# Patient Record
Sex: MALE | Race: ASIAN | NOT HISPANIC OR LATINO | URBAN - METROPOLITAN AREA
[De-identification: names, ages, dates, MRNs, and addresses within clinical notes are randomized per-mention and may not be internally consistent; named-entity substitution may affect disease eponyms.]

---

## 2020-03-30 ENCOUNTER — INPATIENT (INPATIENT)
Facility: HOSPITAL | Age: 60
LOS: 66 days | Discharge: HOME HEALTH | DRG: 329 | End: 2020-06-05
Attending: STUDENT IN AN ORGANIZED HEALTH CARE EDUCATION/TRAINING PROGRAM | Admitting: HOSPITALIST
Payer: MEDICAID

## 2020-03-30 ENCOUNTER — TRANSCRIPTION ENCOUNTER (OUTPATIENT)
Age: 60
End: 2020-03-30

## 2020-03-30 VITALS
OXYGEN SATURATION: 91 % | DIASTOLIC BLOOD PRESSURE: 54 MMHG | HEIGHT: 67 IN | SYSTOLIC BLOOD PRESSURE: 85 MMHG | RESPIRATION RATE: 18 BRPM | WEIGHT: 199.96 LBS | HEART RATE: 63 BPM

## 2020-03-30 LAB
ALBUMIN SERPL ELPH-MCNC: 4.2 G/DL — SIGNIFICANT CHANGE UP (ref 3.4–5)
ALBUMIN SERPL ELPH-MCNC: 4.5 G/DL — SIGNIFICANT CHANGE UP (ref 3.4–5)
ALBUMIN SERPL ELPH-MCNC: 4.8 G/DL — SIGNIFICANT CHANGE UP (ref 3.3–5)
ALP SERPL-CCNC: 212 U/L — HIGH (ref 40–120)
ALP SERPL-CCNC: 244 U/L — HIGH (ref 40–120)
ALP SERPL-CCNC: 262 U/L — HIGH (ref 40–120)
ALT FLD-CCNC: 39 U/L — SIGNIFICANT CHANGE UP (ref 12–42)
ALT FLD-CCNC: 43 U/L — SIGNIFICANT CHANGE UP (ref 10–45)
ALT FLD-CCNC: 49 U/L — HIGH (ref 12–42)
ANION GAP SERPL CALC-SCNC: 17 MMOL/L — HIGH (ref 9–16)
ANION GAP SERPL CALC-SCNC: 18 MMOL/L — HIGH (ref 5–17)
ANION GAP SERPL CALC-SCNC: 19 MMOL/L — HIGH (ref 9–16)
APTT BLD: 25.5 SEC — LOW (ref 27.5–36.3)
AST SERPL-CCNC: 40 U/L — HIGH (ref 15–37)
AST SERPL-CCNC: 64 U/L — HIGH (ref 10–40)
AST SERPL-CCNC: 65 U/L — HIGH (ref 15–37)
B-OH-BUTYR SERPL-SCNC: 0.5 MMOL/L — HIGH
BASOPHILS # BLD AUTO: 0.07 K/UL — SIGNIFICANT CHANGE UP (ref 0–0.2)
BASOPHILS NFR BLD AUTO: 0.4 % — SIGNIFICANT CHANGE UP (ref 0–2)
BILIRUB SERPL-MCNC: 0.4 MG/DL — SIGNIFICANT CHANGE UP (ref 0.2–1.2)
BILIRUB SERPL-MCNC: 0.6 MG/DL — SIGNIFICANT CHANGE UP (ref 0.2–1.2)
BILIRUB SERPL-MCNC: 0.6 MG/DL — SIGNIFICANT CHANGE UP (ref 0.2–1.2)
BUN SERPL-MCNC: 16 MG/DL — SIGNIFICANT CHANGE UP (ref 7–23)
BUN SERPL-MCNC: 18 MG/DL — SIGNIFICANT CHANGE UP (ref 7–23)
BUN SERPL-MCNC: 19 MG/DL — SIGNIFICANT CHANGE UP (ref 7–23)
CALCIUM SERPL-MCNC: 10.8 MG/DL — HIGH (ref 8.5–10.5)
CALCIUM SERPL-MCNC: 10.8 MG/DL — HIGH (ref 8.5–10.5)
CALCIUM SERPL-MCNC: 11.1 MG/DL — HIGH (ref 8.4–10.5)
CHLORIDE SERPL-SCNC: 83 MMOL/L — LOW (ref 96–108)
CHLORIDE SERPL-SCNC: 87 MMOL/L — LOW (ref 96–108)
CHLORIDE SERPL-SCNC: 88 MMOL/L — LOW (ref 96–108)
CO2 SERPL-SCNC: 20 MMOL/L — LOW (ref 22–31)
CREAT SERPL-MCNC: 1.62 MG/DL — HIGH (ref 0.5–1.3)
CREAT SERPL-MCNC: 1.99 MG/DL — HIGH (ref 0.5–1.3)
CREAT SERPL-MCNC: 2.28 MG/DL — HIGH (ref 0.5–1.3)
EOSINOPHIL # BLD AUTO: 0.06 K/UL — SIGNIFICANT CHANGE UP (ref 0–0.5)
EOSINOPHIL NFR BLD AUTO: 0.3 % — SIGNIFICANT CHANGE UP (ref 0–6)
GLUCOSE BLDC GLUCOMTR-MCNC: 227 MG/DL — HIGH (ref 70–99)
GLUCOSE BLDC GLUCOMTR-MCNC: 232 MG/DL — HIGH (ref 70–99)
GLUCOSE BLDC GLUCOMTR-MCNC: 261 MG/DL — HIGH (ref 70–99)
GLUCOSE SERPL-MCNC: 244 MG/DL — HIGH (ref 70–99)
GLUCOSE SERPL-MCNC: 264 MG/DL — HIGH (ref 70–99)
GLUCOSE SERPL-MCNC: 324 MG/DL — HIGH (ref 70–99)
HCT VFR BLD CALC: 37.8 % — LOW (ref 39–50)
HCT VFR BLD CALC: 39.7 % — SIGNIFICANT CHANGE UP (ref 39–50)
HGB BLD-MCNC: 13.2 G/DL — SIGNIFICANT CHANGE UP (ref 13–17)
HGB BLD-MCNC: 13.8 G/DL — SIGNIFICANT CHANGE UP (ref 13–17)
IMM GRANULOCYTES NFR BLD AUTO: 0.7 % — SIGNIFICANT CHANGE UP (ref 0–1.5)
INR BLD: 1.02 — SIGNIFICANT CHANGE UP (ref 0.88–1.16)
LACTATE SERPL-SCNC: 3.6 MMOL/L — HIGH (ref 0.5–2)
LACTATE SERPL-SCNC: 3.7 MMOL/L — HIGH (ref 0.5–2)
LACTATE SERPL-SCNC: 6.8 MMOL/L — CRITICAL HIGH (ref 0.4–2)
LACTATE SERPL-SCNC: 8.6 MMOL/L — CRITICAL HIGH (ref 0.4–2)
LIDOCAIN IGE QN: 748 U/L — HIGH (ref 73–393)
LYMPHOCYTES # BLD AUTO: 1.64 K/UL — SIGNIFICANT CHANGE UP (ref 1–3.3)
LYMPHOCYTES # BLD AUTO: 8.6 % — LOW (ref 13–44)
MAGNESIUM SERPL-MCNC: 2 MG/DL — SIGNIFICANT CHANGE UP (ref 1.6–2.6)
MAGNESIUM SERPL-MCNC: 2 MG/DL — SIGNIFICANT CHANGE UP (ref 1.6–2.6)
MAGNESIUM SERPL-MCNC: 2.1 MG/DL — SIGNIFICANT CHANGE UP (ref 1.6–2.6)
MCHC RBC-ENTMCNC: 29.9 PG — SIGNIFICANT CHANGE UP (ref 27–34)
MCHC RBC-ENTMCNC: 30 PG — SIGNIFICANT CHANGE UP (ref 27–34)
MCHC RBC-ENTMCNC: 34.8 GM/DL — SIGNIFICANT CHANGE UP (ref 32–36)
MCHC RBC-ENTMCNC: 34.9 GM/DL — SIGNIFICANT CHANGE UP (ref 32–36)
MCV RBC AUTO: 85.9 FL — SIGNIFICANT CHANGE UP (ref 80–100)
MCV RBC AUTO: 86.1 FL — SIGNIFICANT CHANGE UP (ref 80–100)
MONOCYTES # BLD AUTO: 0.48 K/UL — SIGNIFICANT CHANGE UP (ref 0–0.9)
MONOCYTES NFR BLD AUTO: 2.5 % — SIGNIFICANT CHANGE UP (ref 2–14)
NEUTROPHILS # BLD AUTO: 16.65 K/UL — HIGH (ref 1.8–7.4)
NEUTROPHILS NFR BLD AUTO: 87.5 % — HIGH (ref 43–77)
NRBC # BLD: 0 /100 WBCS — SIGNIFICANT CHANGE UP (ref 0–0)
NRBC # BLD: 0 /100 WBCS — SIGNIFICANT CHANGE UP (ref 0–0)
NT-PROBNP SERPL-SCNC: 19 PG/ML — SIGNIFICANT CHANGE UP
PCO2 BLDV: 55 MMHG — HIGH (ref 41–51)
PCO2 BLDV: 60 MMHG — HIGH (ref 41–51)
PH BLDV: 7.18 — CRITICAL LOW (ref 7.32–7.43)
PH BLDV: 7.18 — CRITICAL LOW (ref 7.32–7.43)
PHOSPHATE SERPL-MCNC: 6.5 MG/DL — HIGH (ref 2.5–4.5)
PHOSPHATE SERPL-MCNC: 7.8 MG/DL — HIGH (ref 2.5–4.5)
PLATELET # BLD AUTO: 162 K/UL — SIGNIFICANT CHANGE UP (ref 150–400)
PLATELET # BLD AUTO: 229 K/UL — SIGNIFICANT CHANGE UP (ref 150–400)
PO2 BLDV: 25 MMHG — LOW (ref 35–40)
PO2 BLDV: 38 MMHG — SIGNIFICANT CHANGE UP (ref 35–40)
POTASSIUM SERPL-MCNC: 3.4 MMOL/L — LOW (ref 3.5–5.3)
POTASSIUM SERPL-MCNC: 4.2 MMOL/L — SIGNIFICANT CHANGE UP (ref 3.5–5.3)
POTASSIUM SERPL-MCNC: 5.4 MMOL/L — HIGH (ref 3.5–5.3)
POTASSIUM SERPL-SCNC: 3.4 MMOL/L — LOW (ref 3.5–5.3)
POTASSIUM SERPL-SCNC: 4.2 MMOL/L — SIGNIFICANT CHANGE UP (ref 3.5–5.3)
POTASSIUM SERPL-SCNC: 5.4 MMOL/L — HIGH (ref 3.5–5.3)
PROT SERPL-MCNC: 7.9 G/DL — SIGNIFICANT CHANGE UP (ref 6.4–8.2)
PROT SERPL-MCNC: 8.2 G/DL — SIGNIFICANT CHANGE UP (ref 6–8.3)
PROT SERPL-MCNC: 8.4 G/DL — HIGH (ref 6.4–8.2)
PROTHROM AB SERPL-ACNC: 11.3 SEC — SIGNIFICANT CHANGE UP (ref 10–12.9)
RBC # BLD: 4.4 M/UL — SIGNIFICANT CHANGE UP (ref 4.2–5.8)
RBC # BLD: 4.61 M/UL — SIGNIFICANT CHANGE UP (ref 4.2–5.8)
RBC # FLD: 12.5 % — SIGNIFICANT CHANGE UP (ref 10.3–14.5)
RBC # FLD: 12.5 % — SIGNIFICANT CHANGE UP (ref 10.3–14.5)
SAO2 % BLDV: 30 % — SIGNIFICANT CHANGE UP
SAO2 % BLDV: 58 % — SIGNIFICANT CHANGE UP
SODIUM SERPL-SCNC: 122 MMOL/L — LOW (ref 132–145)
SODIUM SERPL-SCNC: 125 MMOL/L — LOW (ref 132–145)
SODIUM SERPL-SCNC: 125 MMOL/L — LOW (ref 135–145)
TROPONIN I SERPL-MCNC: <0.017 NG/ML — LOW (ref 0.02–0.06)
WBC # BLD: 11.19 K/UL — HIGH (ref 3.8–10.5)
WBC # BLD: 19.03 K/UL — HIGH (ref 3.8–10.5)
WBC # FLD AUTO: 11.19 K/UL — HIGH (ref 3.8–10.5)
WBC # FLD AUTO: 19.03 K/UL — HIGH (ref 3.8–10.5)

## 2020-03-30 PROCEDURE — 93010 ELECTROCARDIOGRAM REPORT: CPT

## 2020-03-30 PROCEDURE — 71045 X-RAY EXAM CHEST 1 VIEW: CPT | Mod: 26

## 2020-03-30 PROCEDURE — 70450 CT HEAD/BRAIN W/O DYE: CPT | Mod: 26

## 2020-03-30 PROCEDURE — 71250 CT THORAX DX C-: CPT | Mod: 26

## 2020-03-30 PROCEDURE — 99291 CRITICAL CARE FIRST HOUR: CPT

## 2020-03-30 PROCEDURE — 99291 CRITICAL CARE FIRST HOUR: CPT | Mod: 25

## 2020-03-30 PROCEDURE — 74176 CT ABD & PELVIS W/O CONTRAST: CPT | Mod: 26

## 2020-03-30 RX ORDER — DEXTROSE 50 % IN WATER 50 %
25 SYRINGE (ML) INTRAVENOUS ONCE
Refills: 0 | Status: DISCONTINUED | OUTPATIENT
Start: 2020-03-30 | End: 2020-06-05

## 2020-03-30 RX ORDER — DEXTROSE 50 % IN WATER 50 %
15 SYRINGE (ML) INTRAVENOUS ONCE
Refills: 0 | Status: DISCONTINUED | OUTPATIENT
Start: 2020-03-30 | End: 2020-06-05

## 2020-03-30 RX ORDER — SODIUM CHLORIDE 9 MG/ML
1000 INJECTION, SOLUTION INTRAVENOUS
Refills: 0 | Status: DISCONTINUED | OUTPATIENT
Start: 2020-03-30 | End: 2020-03-30

## 2020-03-30 RX ORDER — POTASSIUM CHLORIDE 20 MEQ
80 PACKET (EA) ORAL ONCE
Refills: 0 | Status: COMPLETED | OUTPATIENT
Start: 2020-03-30 | End: 2020-03-30

## 2020-03-30 RX ORDER — INSULIN HUMAN 100 [IU]/ML
9 INJECTION, SOLUTION SUBCUTANEOUS ONCE
Refills: 0 | Status: DISCONTINUED | OUTPATIENT
Start: 2020-03-30 | End: 2020-03-30

## 2020-03-30 RX ORDER — PIPERACILLIN AND TAZOBACTAM 4; .5 G/20ML; G/20ML
3.38 INJECTION, POWDER, LYOPHILIZED, FOR SOLUTION INTRAVENOUS EVERY 6 HOURS
Refills: 0 | Status: COMPLETED | OUTPATIENT
Start: 2020-03-30 | End: 2020-04-06

## 2020-03-30 RX ORDER — TETRACAINE/BENZOCAINE/BUTAMBEN 2%-14%-2%
1 OINTMENT (GRAM) TOPICAL ONCE
Refills: 0 | Status: COMPLETED | OUTPATIENT
Start: 2020-03-30 | End: 2020-03-30

## 2020-03-30 RX ORDER — SODIUM CHLORIDE 9 MG/ML
1000 INJECTION INTRAMUSCULAR; INTRAVENOUS; SUBCUTANEOUS
Refills: 0 | Status: DISCONTINUED | OUTPATIENT
Start: 2020-03-30 | End: 2020-03-31

## 2020-03-30 RX ORDER — ONDANSETRON 8 MG/1
4 TABLET, FILM COATED ORAL ONCE
Refills: 0 | Status: COMPLETED | OUTPATIENT
Start: 2020-03-30 | End: 2020-03-30

## 2020-03-30 RX ORDER — MORPHINE SULFATE 50 MG/1
4 CAPSULE, EXTENDED RELEASE ORAL ONCE
Refills: 0 | Status: DISCONTINUED | OUTPATIENT
Start: 2020-03-30 | End: 2020-03-30

## 2020-03-30 RX ORDER — SODIUM CHLORIDE 9 MG/ML
1000 INJECTION INTRAMUSCULAR; INTRAVENOUS; SUBCUTANEOUS ONCE
Refills: 0 | Status: COMPLETED | OUTPATIENT
Start: 2020-03-30 | End: 2020-03-30

## 2020-03-30 RX ORDER — ASPIRIN/CALCIUM CARB/MAGNESIUM 324 MG
81 TABLET ORAL DAILY
Refills: 0 | Status: DISCONTINUED | OUTPATIENT
Start: 2020-03-30 | End: 2020-04-15

## 2020-03-30 RX ORDER — HEPARIN SODIUM 5000 [USP'U]/ML
5000 INJECTION INTRAVENOUS; SUBCUTANEOUS EVERY 8 HOURS
Refills: 0 | Status: DISCONTINUED | OUTPATIENT
Start: 2020-03-30 | End: 2020-04-13

## 2020-03-30 RX ORDER — INSULIN HUMAN 100 [IU]/ML
9 INJECTION, SOLUTION SUBCUTANEOUS ONCE
Refills: 0 | Status: COMPLETED | OUTPATIENT
Start: 2020-03-30 | End: 2020-03-30

## 2020-03-30 RX ORDER — PIPERACILLIN AND TAZOBACTAM 4; .5 G/20ML; G/20ML
3.38 INJECTION, POWDER, LYOPHILIZED, FOR SOLUTION INTRAVENOUS ONCE
Refills: 0 | Status: COMPLETED | OUTPATIENT
Start: 2020-03-30 | End: 2020-03-30

## 2020-03-30 RX ORDER — OXYCODONE HYDROCHLORIDE 5 MG/1
5 TABLET ORAL EVERY 6 HOURS
Refills: 0 | Status: DISCONTINUED | OUTPATIENT
Start: 2020-03-30 | End: 2020-04-01

## 2020-03-30 RX ORDER — THIAMINE MONONITRATE (VIT B1) 100 MG
500 TABLET ORAL EVERY 8 HOURS
Refills: 0 | Status: COMPLETED | OUTPATIENT
Start: 2020-03-30 | End: 2020-04-01

## 2020-03-30 RX ORDER — GLUCAGON INJECTION, SOLUTION 0.5 MG/.1ML
1 INJECTION, SOLUTION SUBCUTANEOUS ONCE
Refills: 0 | Status: DISCONTINUED | OUTPATIENT
Start: 2020-03-30 | End: 2020-06-05

## 2020-03-30 RX ORDER — HYDROMORPHONE HYDROCHLORIDE 2 MG/ML
0.5 INJECTION INTRAMUSCULAR; INTRAVENOUS; SUBCUTANEOUS EVERY 6 HOURS
Refills: 0 | Status: DISCONTINUED | OUTPATIENT
Start: 2020-03-30 | End: 2020-04-01

## 2020-03-30 RX ORDER — INSULIN LISPRO 100/ML
VIAL (ML) SUBCUTANEOUS
Refills: 0 | Status: DISCONTINUED | OUTPATIENT
Start: 2020-03-30 | End: 2020-04-17

## 2020-03-30 RX ORDER — INSULIN GLARGINE 100 [IU]/ML
10 INJECTION, SOLUTION SUBCUTANEOUS ONCE
Refills: 0 | Status: COMPLETED | OUTPATIENT
Start: 2020-03-30 | End: 2020-03-30

## 2020-03-30 RX ORDER — DEXTROSE 50 % IN WATER 50 %
12.5 SYRINGE (ML) INTRAVENOUS ONCE
Refills: 0 | Status: DISCONTINUED | OUTPATIENT
Start: 2020-03-30 | End: 2020-06-05

## 2020-03-30 RX ORDER — SODIUM CHLORIDE 9 MG/ML
1000 INJECTION, SOLUTION INTRAVENOUS
Refills: 0 | Status: DISCONTINUED | OUTPATIENT
Start: 2020-03-30 | End: 2020-06-05

## 2020-03-30 RX ORDER — DEXTROSE MONOHYDRATE, SODIUM CHLORIDE, AND POTASSIUM CHLORIDE 50; .745; 4.5 G/1000ML; G/1000ML; G/1000ML
1000 INJECTION, SOLUTION INTRAVENOUS
Refills: 0 | Status: DISCONTINUED | OUTPATIENT
Start: 2020-03-30 | End: 2020-03-30

## 2020-03-30 RX ORDER — CEFTRIAXONE 500 MG/1
1000 INJECTION, POWDER, FOR SOLUTION INTRAMUSCULAR; INTRAVENOUS ONCE
Refills: 0 | Status: DISCONTINUED | OUTPATIENT
Start: 2020-03-30 | End: 2020-03-30

## 2020-03-30 RX ADMIN — SODIUM CHLORIDE 1000 MILLILITER(S): 9 INJECTION INTRAMUSCULAR; INTRAVENOUS; SUBCUTANEOUS at 11:40

## 2020-03-30 RX ADMIN — SODIUM CHLORIDE 2000 MILLILITER(S): 9 INJECTION INTRAMUSCULAR; INTRAVENOUS; SUBCUTANEOUS at 10:39

## 2020-03-30 RX ADMIN — Medication 105 MILLIGRAM(S): at 23:01

## 2020-03-30 RX ADMIN — HEPARIN SODIUM 5000 UNIT(S): 5000 INJECTION INTRAVENOUS; SUBCUTANEOUS at 22:39

## 2020-03-30 RX ADMIN — PIPERACILLIN AND TAZOBACTAM 200 GRAM(S): 4; .5 INJECTION, POWDER, LYOPHILIZED, FOR SOLUTION INTRAVENOUS at 19:47

## 2020-03-30 RX ADMIN — Medication 80 MILLIEQUIVALENT(S): at 11:37

## 2020-03-30 RX ADMIN — Medication 6: at 22:41

## 2020-03-30 RX ADMIN — MORPHINE SULFATE 4 MILLIGRAM(S): 50 CAPSULE, EXTENDED RELEASE ORAL at 13:58

## 2020-03-30 RX ADMIN — PIPERACILLIN AND TAZOBACTAM 3.38 GRAM(S): 4; .5 INJECTION, POWDER, LYOPHILIZED, FOR SOLUTION INTRAVENOUS at 12:05

## 2020-03-30 RX ADMIN — DEXTROSE MONOHYDRATE, SODIUM CHLORIDE, AND POTASSIUM CHLORIDE 150 MILLILITER(S): 50; .745; 4.5 INJECTION, SOLUTION INTRAVENOUS at 11:37

## 2020-03-30 RX ADMIN — INSULIN HUMAN 9 UNIT(S): 100 INJECTION, SOLUTION SUBCUTANEOUS at 11:37

## 2020-03-30 RX ADMIN — SODIUM CHLORIDE 1000 MILLILITER(S): 9 INJECTION INTRAMUSCULAR; INTRAVENOUS; SUBCUTANEOUS at 13:58

## 2020-03-30 RX ADMIN — ONDANSETRON 4 MILLIGRAM(S): 8 TABLET, FILM COATED ORAL at 13:19

## 2020-03-30 RX ADMIN — PIPERACILLIN AND TAZOBACTAM 200 GRAM(S): 4; .5 INJECTION, POWDER, LYOPHILIZED, FOR SOLUTION INTRAVENOUS at 11:36

## 2020-03-30 NOTE — ED ADULT NURSE NOTE - NSIMPLEMENTINTERV_GEN_ALL_ED
Implemented All Universal Safety Interventions:  [place] to call system. Call bell, personal items and telephone within reach. Instruct patient to call for assistance. Room bathroom lighting operational. Non-slip footwear when patient is off stretcher. Physically safe environment: no spills, clutter or unnecessary equipment. Stretcher in lowest position, wheels locked, appropriate side rails in place.

## 2020-03-30 NOTE — ED ADULT NURSE REASSESSMENT NOTE - NS ED NURSE REASSESS COMMENT FT1
NG tube placed by md campbell in right nostril at this time. 300ml bile colored fluid noted at this time.

## 2020-03-30 NOTE — ED PROVIDER NOTE - NONTENDER LOCATION
left upper quadrant/right lower quadrant/right upper quadrant/left lower quadrant/no rebound or guarding

## 2020-03-30 NOTE — H&P ADULT - ASSESSMENT
This is a 61 y/o M with PMH of HTN, DM complicated by neuropathy, EtOH liver cirrhosis, who presents to [facility] for fall at home.  Work up at [facility] concerning for large bowel distention and possible perforation   Lactates initially 8.6 -> 6.8 -> 3.7  WBC 19 -> 11  Patient was well appearing with no abdominal complaints on arrival, contrasting with concerning labs   Having loose, non bloody stools     Plan:   - Admit to Dr Youngblood, ICU   - R/O CDIff   - Continue Zosyn for now   - NPO/NG  - LR @ 150   - SQ heparin   - Will give thiamine   - Discussed with Chief resident This is a 59 y/o M with PMH of HTN, DM complicated by neuropathy, EtOH liver cirrhosis, who presents to [facility] for fall at home.  Work up at [facility] concerning for large bowel distention and possible perforation   Lactates initially 8.6 -> 6.8 -> 3.7  WBC 19 -> 11  Patient was well appearing with no abdominal complaints on arrival, contrasting with concerning labs   Having loose, non bloody stools     Plan:   - Admit to Dr Youngblood, ICU   - R/O CDIff   - Continue Zosyn for now   - NPO/NG  - NS @ 150   - SQ heparin   - Will give thiamine   - Discussed with Chief resident

## 2020-03-30 NOTE — ED PROVIDER NOTE - CLINICAL SUMMARY MEDICAL DECISION MAKING FREE TEXT BOX
60 y o male with past medical history of DM, HTN, depressive disorder, alcohol use disorder, anemia, liver cirrhosis, and peripheral neuropathy was brought in by EMS from [facility] s/p syncope x 2, head trauma, hypoxia, hypotension, pt reports he tried to have BM then got lightheaded, reports 5 day h/o constipation. On exam, BP 85/54, initial O2 sat 91 -> 96% on RA, abdomen distended. pt had large normal BM in ED. will obtain CT chest and abdomen, CT head and neck, labs/ekg. will give IVF and reassess.

## 2020-03-30 NOTE — ED PROVIDER NOTE - RESPIRATORY, MLM
Home
Breath sounds clear and equal bilaterally. no respiratory distress no increased wob or accessory mm use

## 2020-03-30 NOTE — ED ADULT NURSE NOTE - CHPI ED NUR SYMPTOMS NEG
no diarrhea/no dysuria/no hematuria/no fever/no blood in stool/no nausea/no vomiting/no burning urination/no chills

## 2020-03-30 NOTE — ED ADULT TRIAGE NOTE - CHIEF COMPLAINT QUOTE
[facility]int to ED from shelter for multiple medical complaints.  Patient hypotensive in triage, hyperglycemia, multiple falls, and low O2 saturation (87%) - 96% en route

## 2020-03-30 NOTE — ED PROVIDER NOTE - CARE PLAN
Principal Discharge DX:	Intestinal perforation  Secondary Diagnosis:	Megacolon Principal Discharge DX:	Intestinal perforation  Secondary Diagnosis:	Megacolon  Secondary Diagnosis:	Hyperglycemia

## 2020-03-30 NOTE — ED PROVIDER NOTE - PROGRESS NOTE DETAILS
Pt was successfully able to pass two large BM's in ED on his own.     Upon speaking to him again, patient stated he felt dizzy this morning and fell twice. He states that prior to this morning he was in a normal state of health. Denies cough, fever, chills, or body aches. Pt reports taking lactulose PRN when having difficulty going to bathroom. He endorses history of cirrhosis. Pt was unable to recall last hospitalization and gets most of his care at [facility]. Called [facility] to initiate transfer to ICU. CT abd with positive finding for intestinal perforation, CT chest positive for some infiltrates as well, however, low suspicion for COVID-19 at this time. Pt is on a fluid drip. [place] PGY3: on reassessment pt reports 10/10 severity abdominal pain, appears in NAD abdomen diffusely tender with + rebound, is soft with no guarding. CT shows findings as reported above obstruction ? perforation. NGT placed with 600 mL bilious fluid returned. labs shows acidemia to 7.19 and AG 18 -> 17 after 10 u SQ insulin, lactate 8 -> 6.8. pt in NAD XR shows NGT deep in abdomen was pulled back and on repeat CXR shows NGT appropriately placed. morphine given pt transferred to [place]

## 2020-03-30 NOTE — ED PROCEDURE NOTE - CPROC ED GASTRIC INTUB DETAIL1
The nasogastric tube (see size above) was inserted via the anatomic location./Gastric tube connected to low continuous suction./Placement was confirmed by aspiration of gastric secretions.

## 2020-03-30 NOTE — CONSULT NOTE ADULT - ASSESSMENT
61 y/o M with PMH of HTN, DM complicated by neuropathy, EtOH liver cirrhosis, presenting w/ Uriah's and pneumatosis admitted to SICU for hemodynamic monitoring.    Neuro: CT head negative for bleed or fracture  CV: normotensive goals  Pulm: resp hygiene   FENGI: NPO, NS @150  ID: zosyn (3/30 - )  Endo: ISS  PPx: SCDs, SQH  PT/OT: not ordered

## 2020-03-30 NOTE — ED PROVIDER NOTE - OBJECTIVE STATEMENT
60 y o male with past medical history of DM, HTN, depressive disorder, alcohol use disorder, anemia, liver cirrhosis, and peripheral neuropathy was brought in by EMS from [facility] for multiple medical complaints. As per EMS, patient told them he "fell twice in 1 hour" with no endorsed LOC. Upon EMS arrival, patient was reportedly hyperglycemic and had O2 sats between 87% - 96%. Patient is complaining of constipation, last bowel movement about 4 days ago, and stated it was secondary to prescribed medication use. No fever, chills, chest pain, cough, shortness of breath, abdominal pain, nausea, vomiting, diarrhea, dysuria, or other complaints were endorsed. BP 85/54, O2 91% in triage.     Medications: Nexium, Lisinopril, Neurontin, Amlodipine Besylate, Citalopram, Metformin, Rifampin, Insulin, Metoprolol, Quetiapine, Trazadone, Levothyroxine, Senna-Lax 60 y o male with past medical history of DM, HTN, depressive disorder, alcohol use disorder, anemia, liver cirrhosis, and peripheral neuropathy was brought in by EMS from [facility] for multiple medical complaints. As per EMS, patient told them he "fell twice in 1 hour" with no endorsed LOC. Upon EMS arrival, patient was reportedly hyperglycemic and had O2 sats between 87% - 96% on ra. Patient is complaining of constipation, last bowel movement about 4 days ago, and stated it was secondary to prescribed medication use. Also endorses abdominal pain since this morning.  Takes lactulose PRN for constipation.  No fever, chills, chest pain, cough, shortness of breath, abdominal pain, nausea, vomiting, diarrhea, dysuria, or other complaints were endorsed. BP 85/54, O2 91% in triage.     Medications: Nexium, Lisinopril, Neurontin, Amlodipine Besylate, Citalopram, Metformin, Rifampin, Insulin, Metoprolol, Quetiapine, Trazadone, Levothyroxine, Senna-Lax

## 2020-03-30 NOTE — H&P ADULT - HISTORY OF PRESENT ILLNESS
HPI:  This is a 59 y/o M with PMH of HTN, DM complicated by neuropathy, EtOH liver cirrhosis, who presents to [facility] for fall at home.   At [facility] patient reported that he takes lactulose at home daily and has daily BM, but he has had no BM for the last 5 days.   At [facility] labs concerning for WBC 19, Cr 2.28, Lactates 8.6 (6.8 after 2L of IVF)  CT showing distended colon with small area of pneumatosis.   On arrival patient was stable, denied abdominal pain, said he has had 5 liquid/non bloody BM since coming to the ED.   Patient says he has had a Cscope in the past but does not remmeber when, he said it was normal    PAST MEDICAL & SURGICAL HISTORY:  Anemia  ETOH abuse  HLD (hyperlipidemia)  HTN (hypertension)  DM (diabetes mellitus)  No significant past surgical history      MEDICATIONS  (STANDING):  dextrose 5%. 1000 milliLiter(s) (50 mL/Hr) IV Continuous <Continuous>  dextrose 50% Injectable 12.5 Gram(s) IV Push once  dextrose 50% Injectable 25 Gram(s) IV Push once  dextrose 50% Injectable 25 Gram(s) IV Push once  heparin  Injectable 5000 Unit(s) SubCutaneous every 8 hours  insulin lispro (HumaLOG) corrective regimen sliding scale   SubCutaneous Before meals and at bedtime  lactated ringers. 1000 milliLiter(s) (150 mL/Hr) IV Continuous <Continuous>  piperacillin/tazobactam IVPB.. 3.375 Gram(s) IV Intermittent every 6 hours  sodium chloride 0.9% with potassium chloride 20 mEq/L 1000 milliLiter(s) (150 mL/Hr) IV Continuous <Continuous>  thiamine IVPB 500 milliGRAM(s) IV Intermittent every 8 hours    MEDICATIONS  (PRN):  dextrose 40% Gel 15 Gram(s) Oral once PRN Blood Glucose LESS THAN 70 milliGRAM(s)/deciliter  glucagon  Injectable 1 milliGRAM(s) IntraMuscular once PRN Glucose LESS THAN 70 milligrams/deciliter  HYDROmorphone  Injectable 0.5 milliGRAM(s) IV Push every 6 hours PRN Severe Pain (7 - 10)  oxyCODONE    IR 5 milliGRAM(s) Oral every 6 hours PRN Moderate Pain (4 - 6)      Allergies    No Known Allergies    Intolerances        SOCIAL HISTORY: says quit drinking in 2016     FAMILY HISTORY: no history of colorectal cancer       Vital Signs Last 24 Hrs  T(C): 36.3 (30 Mar 2020 16:38), Max: 36.3 (30 Mar 2020 10:40)  T(F): 97.3 (30 Mar 2020 16:38), Max: 97.4 (30 Mar 2020 10:40)  HR: 86 (30 Mar 2020 18:39) (62 - 88)  BP: 141/63 (30 Mar 2020 18:39) (85/54 - 163/75)  BP(mean): 90 (30 Mar 2020 18:39) (71 - 108)  RR: 20 (30 Mar 2020 18:39) (18 - 36)  SpO2: 98% (30 Mar 2020 18:39) (91% - 100%)    PHYSICAL EXAM  Neuro: awake and alert, no focal deficit   HEENT: normocephalic, no scleral ictera   Pulm: non labored breathing on NC, symmetric chest expansion   CV: regular rate and rhythm, palpable DP pulses b/l    GI: abdomen is distended, non tender to palpation, +hepatomegaly    Rectal exam: no mass   MSK: right toe amputation   Skin: no rash, no wound       LABS:                        13.8   11.19 )-----------( 162      ( 30 Mar 2020 17:39 )             39.7     03-30    125<L>  |  87<L>  |  18  ----------------------------<  244<H>  5.4<H>   |  20<L>  |  1.62<H>    Ca    11.1<H>      30 Mar 2020 17:39  Phos  6.5     03-30  Mg     2.0     03-30    TPro  8.2  /  Alb  4.8  /  TBili  0.6  /  DBili  x   /  AST  64<H>  /  ALT  43  /  AlkPhos  244<H>  03-30    PT/INR - ( 30 Mar 2020 10:35 )   PT: 11.3 sec;   INR: 1.02          PTT - ( 30 Mar 2020 10:35 )  PTT:25.5 sec      RADIOLOGY & ADDITIONAL STUDIES:

## 2020-03-30 NOTE — ED ADULT NURSE NOTE - OBJECTIVE STATEMENT
59 yo M presents to ED for abdominal pain and dizziness. Pt states "I have chronic constipation and took lactulose this morning around 6am and my stomach started really hurting and I felt dizzy and fell down 2 times while trying to go to the bathroom". Pt noted to have distended abdomen +tenderness to touch. Pt had large soft BM upon arriving to unit. Pt has noted left 3rd,4th and 5th toe amputation. Pt is A&Ox3 at this time. no sign of trauma to head. Pt denies LOC, cp,sob,cough,fever,n/v at this time. 59 yo M presents to ED for abdominal pain and dizziness. Pt states "I have chronic constipation and took lactulose this morning around 6am and my stomach started really hurting and I felt dizzy and fell down 2 times while trying to go to the bathroom". Pt noted to have distended abdomen +tenderness to touch. Pt had large soft BM upon arriving to unit. Pt has noted left 3rd,4th and 5th toe amputation. Pt is A&Ox3 at this time. no sign of trauma to head. Pt denies LOC, cp,sob,cough,fever,n/v at this time. cont pulse ox and telemetry monitoring initiated.

## 2020-03-30 NOTE — ED PROVIDER NOTE - ATTENDING CONTRIBUTION TO CARE
60 y o male with past medical history of DM, HTN, depressive disorder, alcohol use disorder, anemia, liver cirrhosis, and peripheral neuropathy was brought in by EMS from [facility] s/p syncope x 2 preceded by dizziness, denies head trauma but with hypoxia, hypotension, pt reports he tried to have BM then got lightheaded.  Last BM 5 days ago.  On arrival, BP 85/54, initial O2 sat 91 -> 96% on RA, abdomen distended with guarding and mild tenderness.  Unkempt, sick but non toxic appearing.  Denies cough, sob, sick contacts, fever, chills, body aches.  Moving air well, RR in the 20s likely due to abdominal distention.  Work up revealed.  elevated white count with left shift, elevated lactate, MEG, hyperglycemia, mixed respiratory and metabolic acidosis.  Imaging concerning for ascending colon perforation in background of toxic megacolon.  Other findings acknowledged.  BP improved with 1L NS but additional bolus since patient had collapsed IVC on CT scan.  Given IV zosyn, potassium supplementation (low end of normal but receiving insulin).  Discussed case with Dr. Faust and Shasta.  Do not suspect COVID-19.  Admit to SICU.  NGT placed with output of 300 cc of gastric fluids confirming tube placement with CXR.  No fecal impaction.  [facility] transfer and medics here for transfer so will defer rectal tube until patient arrives at [facility].  Patient verbally consents to admission to SICU.  Blood cultures pending.  repeat labs ordered prior to departure. 60 y o male with past medical history of DM, HTN, depressive disorder, alcohol use disorder, anemia, liver cirrhosis, and peripheral neuropathy was brought in by EMS from [facility] s/p syncope x 2 preceded by dizziness, denies head trauma but with hypoxia, hypotension, pt reports he tried to have BM then got lightheaded.  Last BM 5 days ago.  On arrival, BP 85/54, initial O2 sat 91 -> 96% on RA, abdomen distended with guarding and mild tenderness.  Unkempt, sick but non toxic appearing.  Denies cough, sob, sick contacts, fever, chills, body aches.  Moving air well, RR in the 20s likely due to abdominal distention.  Work up revealed.  elevated white count with left shift, elevated lactate, MEG, hyperglycemia, mixed respiratory and metabolic acidosis.  Imaging concerning for ascending colon perforation in background of toxic megacolon.  Other findings acknowledged.  BP improved with 1L NS but additional bolus since patient had collapsed IVC on CT scan.  Given IV zosyn, potassium supplementation (low end of normal but receiving insulin).  Discussed case with Dr. Faust and Shasta.  Do not suspect COVID-19.  Admit to SICU.  NGT placed with output of 600 cc of gastric fluids confirming tube placement with CXR.  No fecal impaction on rectal exam.  [facility] transfer and medics here for transfer so will defer rectal tube until patient arrives at [facility].  Patient verbally consents to admission to SICU.  Blood cultures pending.  repeat labs ordered prior to departure.

## 2020-03-30 NOTE — CONSULT NOTE ADULT - SUBJECTIVE AND OBJECTIVE BOX
SICU consult note:    HPI:  This is a 61 y/o M with PMH of HTN, DM complicated by neuropathy, EtOH liver cirrhosis, who presents to [facility] for fall at home.   At [facility] patient reported that he takes lactulose at home daily and has daily BM, but he has had no BM for the last 5 days.   At [facility] labs concerning for WBC 19, Cr 2.28, Lactates 8.6 (6.8 after 2L of IVF)  CT showing distended colon with small area of pneumatosis.   On arrival patient was stable, denied abdominal pain, said he has had 5 liquid/non bloody BM since coming to the ED.   Patient says he has had a Cscope in the past but does not remmeber when, he said it was normal    SICU addendum:    Pt currently hemodynamically stable w/ lactate trending down. Admitted to SICU for hemodynamic monitoring.     Note: patient med rec unreliable, will contact pharmacy and/or PCP in AM    Vital Signs Last 24 Hrs  T(C): 35.9 (30 Mar 2020 20:38), Max: 36.3 (30 Mar 2020 10:40)  T(F): 96.6 (30 Mar 2020 20:38), Max: 97.4 (30 Mar 2020 10:40)  HR: 100 (30 Mar 2020 22:00) (62 - 100)  BP: 162/73 (30 Mar 2020 22:00) (85/54 - 167/79)  BP(mean): 105 (30 Mar 2020 22:00) (71 - 113)  RR: 19 (30 Mar 2020 22:00) (18 - 36)  SpO2: 96% (30 Mar 2020 22:00) (91% - 100%)    PHYSICAL EXAM  Neuro: awake and alert, no focal deficit   HEENT: normocephalic, no scleral ictera   Pulm: non labored breathing on NC, symmetric chest expansion   CV: regular rate and rhythm, palpable DP pulses b/l    GI: abdomen is distended, non tender to palpation, +hepatomegaly    Rectal exam: no mass   MSK: right toe amputation   Skin: no rash, no wound                           13.8   11.19 )-----------( 162      ( 30 Mar 2020 17:39 )             39.7   03-30    125<L>  |  87<L>  |  18  ----------------------------<  244<H>  5.4<H>   |  20<L>  |  1.62<H>    Ca    11.1<H>      30 Mar 2020 17:39  Phos  6.5     03-30  Mg     2.0     03-30    TPro  8.2  /  Alb  4.8  /  TBili  0.6  /  DBili  x   /  AST  64<H>  /  ALT  43  /  AlkPhos  244<H>  03-30

## 2020-03-31 ENCOUNTER — RESULT REVIEW (OUTPATIENT)
Age: 60
End: 2020-03-31

## 2020-03-31 LAB
ANION GAP SERPL CALC-SCNC: 13 MMOL/L — SIGNIFICANT CHANGE UP (ref 5–17)
ANION GAP SERPL CALC-SCNC: 13 MMOL/L — SIGNIFICANT CHANGE UP (ref 5–17)
ANION GAP SERPL CALC-SCNC: 8 MMOL/L — SIGNIFICANT CHANGE UP (ref 5–17)
APPEARANCE UR: CLEAR — SIGNIFICANT CHANGE UP
APTT BLD: 38.2 SEC — HIGH (ref 27.5–36.3)
BASE EXCESS BLDA CALC-SCNC: -6.3 MMOL/L — LOW (ref -2–3)
BASE EXCESS BLDA CALC-SCNC: -8.2 MMOL/L — LOW (ref -2–3)
BILIRUB UR-MCNC: NEGATIVE — SIGNIFICANT CHANGE UP
BLD GP AB SCN SERPL QL: NEGATIVE — SIGNIFICANT CHANGE UP
BUN SERPL-MCNC: 20 MG/DL — SIGNIFICANT CHANGE UP (ref 7–23)
BUN SERPL-MCNC: 23 MG/DL — SIGNIFICANT CHANGE UP (ref 7–23)
BUN SERPL-MCNC: 23 MG/DL — SIGNIFICANT CHANGE UP (ref 7–23)
C DIFF GDH STL QL: NEGATIVE — SIGNIFICANT CHANGE UP
C DIFF GDH STL QL: SIGNIFICANT CHANGE UP
CA-I BLDA-SCNC: 1.04 MMOL/L — LOW (ref 1.12–1.3)
CALCIUM SERPL-MCNC: 10 MG/DL — SIGNIFICANT CHANGE UP (ref 8.4–10.5)
CALCIUM SERPL-MCNC: 7.2 MG/DL — LOW (ref 8.4–10.5)
CALCIUM SERPL-MCNC: 9.5 MG/DL — SIGNIFICANT CHANGE UP (ref 8.4–10.5)
CHLORIDE SERPL-SCNC: 105 MMOL/L — SIGNIFICANT CHANGE UP (ref 96–108)
CHLORIDE SERPL-SCNC: 98 MMOL/L — SIGNIFICANT CHANGE UP (ref 96–108)
CHLORIDE SERPL-SCNC: 99 MMOL/L — SIGNIFICANT CHANGE UP (ref 96–108)
CO2 SERPL-SCNC: 16 MMOL/L — LOW (ref 22–31)
CO2 SERPL-SCNC: 16 MMOL/L — LOW (ref 22–31)
CO2 SERPL-SCNC: 17 MMOL/L — LOW (ref 22–31)
COHGB MFR BLDA: 0.4 % — SIGNIFICANT CHANGE UP
COLOR SPEC: YELLOW — SIGNIFICANT CHANGE UP
CREAT SERPL-MCNC: 1.17 MG/DL — SIGNIFICANT CHANGE UP (ref 0.5–1.3)
CREAT SERPL-MCNC: 1.28 MG/DL — SIGNIFICANT CHANGE UP (ref 0.5–1.3)
CREAT SERPL-MCNC: 1.33 MG/DL — HIGH (ref 0.5–1.3)
CRP SERPL-MCNC: 41.78 MG/DL — HIGH (ref 0–0.4)
CULTURE RESULTS: SIGNIFICANT CHANGE UP
DIFF PNL FLD: ABNORMAL
GLUCOSE BLDC GLUCOMTR-MCNC: 133 MG/DL — HIGH (ref 70–99)
GLUCOSE BLDC GLUCOMTR-MCNC: 149 MG/DL — HIGH (ref 70–99)
GLUCOSE BLDC GLUCOMTR-MCNC: 159 MG/DL — HIGH (ref 70–99)
GLUCOSE BLDC GLUCOMTR-MCNC: 160 MG/DL — HIGH (ref 70–99)
GLUCOSE BLDC GLUCOMTR-MCNC: 193 MG/DL — HIGH (ref 70–99)
GLUCOSE BLDC GLUCOMTR-MCNC: 198 MG/DL — HIGH (ref 70–99)
GLUCOSE BLDC GLUCOMTR-MCNC: 198 MG/DL — HIGH (ref 70–99)
GLUCOSE BLDC GLUCOMTR-MCNC: 209 MG/DL — HIGH (ref 70–99)
GLUCOSE BLDC GLUCOMTR-MCNC: 257 MG/DL — HIGH (ref 70–99)
GLUCOSE BLDC GLUCOMTR-MCNC: 279 MG/DL — HIGH (ref 70–99)
GLUCOSE SERPL-MCNC: 160 MG/DL — HIGH (ref 70–99)
GLUCOSE SERPL-MCNC: 232 MG/DL — HIGH (ref 70–99)
GLUCOSE SERPL-MCNC: 282 MG/DL — HIGH (ref 70–99)
GLUCOSE UR QL: 100
GRAM STN FLD: SIGNIFICANT CHANGE UP
HBA1C BLD-MCNC: 7.6 % — HIGH (ref 4–5.6)
HCO3 BLDA-SCNC: 16 MMOL/L — LOW (ref 21–28)
HCO3 BLDA-SCNC: 19 MMOL/L — LOW (ref 21–28)
HCT VFR BLD CALC: 25.6 % — LOW (ref 39–50)
HCT VFR BLD CALC: 25.9 % — LOW (ref 39–50)
HCT VFR BLD CALC: 40.6 % — SIGNIFICANT CHANGE UP (ref 39–50)
HCV AB S/CO SERPL IA: 0.08 S/CO — SIGNIFICANT CHANGE UP
HCV AB SERPL-IMP: SIGNIFICANT CHANGE UP
HGB BLD-MCNC: 13.8 G/DL — SIGNIFICANT CHANGE UP (ref 13–17)
HGB BLD-MCNC: 8.6 G/DL — LOW (ref 13–17)
HGB BLD-MCNC: 9 G/DL — LOW (ref 13–17)
HGB BLDA-MCNC: 9.1 G/DL — LOW (ref 13–17)
INR BLD: 1.55 — HIGH (ref 0.88–1.16)
KETONES UR-MCNC: ABNORMAL MG/DL
LACTATE SERPL-SCNC: 1.4 MMOL/L — SIGNIFICANT CHANGE UP (ref 0.5–2)
LACTATE SERPL-SCNC: 2 MMOL/L — SIGNIFICANT CHANGE UP (ref 0.5–2)
LACTATE SERPL-SCNC: 2.8 MMOL/L — HIGH (ref 0.5–2)
LEUKOCYTE ESTERASE UR-ACNC: NEGATIVE — SIGNIFICANT CHANGE UP
MAGNESIUM SERPL-MCNC: 1.6 MG/DL — SIGNIFICANT CHANGE UP (ref 1.6–2.6)
MAGNESIUM SERPL-MCNC: 1.7 MG/DL — SIGNIFICANT CHANGE UP (ref 1.6–2.6)
MCHC RBC-ENTMCNC: 29.2 PG — SIGNIFICANT CHANGE UP (ref 27–34)
MCHC RBC-ENTMCNC: 29.5 PG — SIGNIFICANT CHANGE UP (ref 27–34)
MCHC RBC-ENTMCNC: 29.8 PG — SIGNIFICANT CHANGE UP (ref 27–34)
MCHC RBC-ENTMCNC: 33.6 GM/DL — SIGNIFICANT CHANGE UP (ref 32–36)
MCHC RBC-ENTMCNC: 34 GM/DL — SIGNIFICANT CHANGE UP (ref 32–36)
MCHC RBC-ENTMCNC: 34.7 GM/DL — SIGNIFICANT CHANGE UP (ref 32–36)
MCV RBC AUTO: 85.8 FL — SIGNIFICANT CHANGE UP (ref 80–100)
MCV RBC AUTO: 86 FL — SIGNIFICANT CHANGE UP (ref 80–100)
MCV RBC AUTO: 87.7 FL — SIGNIFICANT CHANGE UP (ref 80–100)
METHGB MFR BLDA: 0.3 % — SIGNIFICANT CHANGE UP
NITRITE UR-MCNC: NEGATIVE — SIGNIFICANT CHANGE UP
NRBC # BLD: 0 /100 WBCS — SIGNIFICANT CHANGE UP (ref 0–0)
O2 CT VFR BLDA CALC: 13.2 ML/DL — SIGNIFICANT CHANGE UP (ref 15–23)
OXYHGB MFR BLDA: 98 % — SIGNIFICANT CHANGE UP (ref 94–100)
PCO2 BLDA: 30 MMHG — LOW (ref 35–48)
PCO2 BLDA: 35 MMHG — SIGNIFICANT CHANGE UP (ref 35–48)
PH BLDA: 7.35 — SIGNIFICANT CHANGE UP (ref 7.35–7.45)
PH BLDA: 7.35 — SIGNIFICANT CHANGE UP (ref 7.35–7.45)
PH UR: 5.5 — SIGNIFICANT CHANGE UP (ref 5–8)
PHOSPHATE SERPL-MCNC: 3 MG/DL — SIGNIFICANT CHANGE UP (ref 2.5–4.5)
PHOSPHATE SERPL-MCNC: 4.7 MG/DL — HIGH (ref 2.5–4.5)
PLATELET # BLD AUTO: 134 K/UL — LOW (ref 150–400)
PLATELET # BLD AUTO: 136 K/UL — LOW (ref 150–400)
PLATELET # BLD AUTO: 192 K/UL — SIGNIFICANT CHANGE UP (ref 150–400)
PO2 BLDA: 247 MMHG — HIGH (ref 83–108)
PO2 BLDA: 293 MMHG — HIGH (ref 83–108)
POTASSIUM BLDA-SCNC: 3.9 MMOL/L — SIGNIFICANT CHANGE UP (ref 3.5–4.9)
POTASSIUM SERPL-MCNC: 4.6 MMOL/L — SIGNIFICANT CHANGE UP (ref 3.5–5.3)
POTASSIUM SERPL-MCNC: 5.6 MMOL/L — HIGH (ref 3.5–5.3)
POTASSIUM SERPL-MCNC: 6 MMOL/L — HIGH (ref 3.5–5.3)
POTASSIUM SERPL-SCNC: 4.6 MMOL/L — SIGNIFICANT CHANGE UP (ref 3.5–5.3)
POTASSIUM SERPL-SCNC: 5.6 MMOL/L — HIGH (ref 3.5–5.3)
POTASSIUM SERPL-SCNC: 6 MMOL/L — HIGH (ref 3.5–5.3)
PROT UR-MCNC: 30 MG/DL
PROTHROM AB SERPL-ACNC: 17.9 SEC — HIGH (ref 10–12.9)
RBC # BLD: 2.92 M/UL — LOW (ref 4.2–5.8)
RBC # BLD: 3.02 M/UL — LOW (ref 4.2–5.8)
RBC # BLD: 4.72 M/UL — SIGNIFICANT CHANGE UP (ref 4.2–5.8)
RBC # FLD: 12.6 % — SIGNIFICANT CHANGE UP (ref 10.3–14.5)
RBC # FLD: 12.9 % — SIGNIFICANT CHANGE UP (ref 10.3–14.5)
RBC # FLD: 13 % — SIGNIFICANT CHANGE UP (ref 10.3–14.5)
RH IG SCN BLD-IMP: POSITIVE — SIGNIFICANT CHANGE UP
SAO2 % BLDA: 99 % — SIGNIFICANT CHANGE UP (ref 95–100)
SAO2 % BLDA: 99 % — SIGNIFICANT CHANGE UP (ref 95–100)
SARS-COV-2 RNA SPEC QL NAA+PROBE: SIGNIFICANT CHANGE UP
SODIUM BLDA-SCNC: 129 MMOL/L — LOW (ref 138–146)
SODIUM SERPL-SCNC: 127 MMOL/L — LOW (ref 135–145)
SODIUM SERPL-SCNC: 128 MMOL/L — LOW (ref 135–145)
SODIUM SERPL-SCNC: 130 MMOL/L — LOW (ref 135–145)
SP GR SPEC: 1.02 — SIGNIFICANT CHANGE UP (ref 1–1.03)
SPECIMEN SOURCE: SIGNIFICANT CHANGE UP
UROBILINOGEN FLD QL: 0.2 E.U./DL — SIGNIFICANT CHANGE UP
WBC # BLD: 1.53 K/UL — LOW (ref 3.8–10.5)
WBC # BLD: 12.03 K/UL — HIGH (ref 3.8–10.5)
WBC # BLD: 2.42 K/UL — LOW (ref 3.8–10.5)
WBC # FLD AUTO: 1.53 K/UL — LOW (ref 3.8–10.5)
WBC # FLD AUTO: 12.03 K/UL — HIGH (ref 3.8–10.5)
WBC # FLD AUTO: 2.42 K/UL — LOW (ref 3.8–10.5)

## 2020-03-31 PROCEDURE — 88307 TISSUE EXAM BY PATHOLOGIST: CPT | Mod: 26

## 2020-03-31 PROCEDURE — 88305 TISSUE EXAM BY PATHOLOGIST: CPT | Mod: 26

## 2020-03-31 PROCEDURE — 71045 X-RAY EXAM CHEST 1 VIEW: CPT | Mod: 26

## 2020-03-31 PROCEDURE — 93306 TTE W/DOPPLER COMPLETE: CPT | Mod: 26

## 2020-03-31 PROCEDURE — 44151 REMOVAL OF COLON/ILEOSTOMY: CPT

## 2020-03-31 PROCEDURE — 74019 RADEX ABDOMEN 2 VIEWS: CPT | Mod: 26

## 2020-03-31 PROCEDURE — 99222 1ST HOSP IP/OBS MODERATE 55: CPT | Mod: GC

## 2020-03-31 RX ORDER — INSULIN HUMAN 100 [IU]/ML
5 INJECTION, SOLUTION SUBCUTANEOUS
Qty: 50 | Refills: 0 | Status: DISCONTINUED | OUTPATIENT
Start: 2020-03-31 | End: 2020-04-01

## 2020-03-31 RX ORDER — MAGNESIUM SULFATE 500 MG/ML
2 VIAL (ML) INJECTION ONCE
Refills: 0 | Status: COMPLETED | OUTPATIENT
Start: 2020-03-31 | End: 2020-03-31

## 2020-03-31 RX ORDER — FENTANYL CITRATE 50 UG/ML
0.5 INJECTION INTRAVENOUS
Qty: 2500 | Refills: 0 | Status: DISCONTINUED | OUTPATIENT
Start: 2020-03-31 | End: 2020-04-01

## 2020-03-31 RX ORDER — INSULIN HUMAN 100 [IU]/ML
5 INJECTION, SOLUTION SUBCUTANEOUS ONCE
Refills: 0 | Status: COMPLETED | OUTPATIENT
Start: 2020-03-31 | End: 2020-03-31

## 2020-03-31 RX ORDER — CHLORHEXIDINE GLUCONATE 213 G/1000ML
15 SOLUTION TOPICAL EVERY 12 HOURS
Refills: 0 | Status: DISCONTINUED | OUTPATIENT
Start: 2020-03-31 | End: 2020-04-07

## 2020-03-31 RX ORDER — SODIUM CHLORIDE 9 MG/ML
1000 INJECTION INTRAMUSCULAR; INTRAVENOUS; SUBCUTANEOUS ONCE
Refills: 0 | Status: COMPLETED | OUTPATIENT
Start: 2020-03-31 | End: 2020-03-31

## 2020-03-31 RX ORDER — INSULIN GLARGINE 100 [IU]/ML
10 INJECTION, SOLUTION SUBCUTANEOUS AT BEDTIME
Refills: 0 | Status: DISCONTINUED | OUTPATIENT
Start: 2020-03-31 | End: 2020-03-31

## 2020-03-31 RX ORDER — CALCIUM GLUCONATE 100 MG/ML
1 VIAL (ML) INTRAVENOUS ONCE
Refills: 0 | Status: COMPLETED | OUTPATIENT
Start: 2020-03-31 | End: 2020-03-31

## 2020-03-31 RX ORDER — SODIUM BICARBONATE 1 MEQ/ML
0.08 SYRINGE (ML) INTRAVENOUS
Qty: 75 | Refills: 0 | Status: DISCONTINUED | OUTPATIENT
Start: 2020-03-31 | End: 2020-04-02

## 2020-03-31 RX ORDER — PROPOFOL 10 MG/ML
9.19 INJECTION, EMULSION INTRAVENOUS
Qty: 1000 | Refills: 0 | Status: DISCONTINUED | OUTPATIENT
Start: 2020-03-31 | End: 2020-04-01

## 2020-03-31 RX ADMIN — PIPERACILLIN AND TAZOBACTAM 200 GRAM(S): 4; .5 INJECTION, POWDER, LYOPHILIZED, FOR SOLUTION INTRAVENOUS at 13:46

## 2020-03-31 RX ADMIN — INSULIN GLARGINE 10 UNIT(S): 100 INJECTION, SOLUTION SUBCUTANEOUS at 00:25

## 2020-03-31 RX ADMIN — PIPERACILLIN AND TAZOBACTAM 200 GRAM(S): 4; .5 INJECTION, POWDER, LYOPHILIZED, FOR SOLUTION INTRAVENOUS at 22:21

## 2020-03-31 RX ADMIN — INSULIN HUMAN 5 UNIT(S)/HR: 100 INJECTION, SOLUTION SUBCUTANEOUS at 12:31

## 2020-03-31 RX ADMIN — Medication 105 MILLIGRAM(S): at 23:00

## 2020-03-31 RX ADMIN — HEPARIN SODIUM 5000 UNIT(S): 5000 INJECTION INTRAVENOUS; SUBCUTANEOUS at 06:05

## 2020-03-31 RX ADMIN — PROPOFOL 5 MICROGRAM(S)/KG/MIN: 10 INJECTION, EMULSION INTRAVENOUS at 21:15

## 2020-03-31 RX ADMIN — HEPARIN SODIUM 5000 UNIT(S): 5000 INJECTION INTRAVENOUS; SUBCUTANEOUS at 22:58

## 2020-03-31 RX ADMIN — SODIUM CHLORIDE 1000 MILLILITER(S): 9 INJECTION INTRAMUSCULAR; INTRAVENOUS; SUBCUTANEOUS at 02:56

## 2020-03-31 RX ADMIN — SODIUM CHLORIDE 1000 MILLILITER(S): 9 INJECTION INTRAMUSCULAR; INTRAVENOUS; SUBCUTANEOUS at 00:55

## 2020-03-31 RX ADMIN — Medication 100 GRAM(S): at 08:41

## 2020-03-31 RX ADMIN — Medication 105 MILLIGRAM(S): at 06:05

## 2020-03-31 RX ADMIN — Medication 50 GRAM(S): at 06:59

## 2020-03-31 RX ADMIN — PIPERACILLIN AND TAZOBACTAM 200 GRAM(S): 4; .5 INJECTION, POWDER, LYOPHILIZED, FOR SOLUTION INTRAVENOUS at 02:28

## 2020-03-31 RX ADMIN — INSULIN HUMAN 5 UNIT(S): 100 INJECTION, SOLUTION SUBCUTANEOUS at 08:40

## 2020-03-31 RX ADMIN — PIPERACILLIN AND TAZOBACTAM 200 GRAM(S): 4; .5 INJECTION, POWDER, LYOPHILIZED, FOR SOLUTION INTRAVENOUS at 08:00

## 2020-03-31 RX ADMIN — Medication 81 MILLIGRAM(S): at 13:52

## 2020-03-31 RX ADMIN — FENTANYL CITRATE 4.54 MICROGRAM(S)/KG/HR: 50 INJECTION INTRAVENOUS at 21:30

## 2020-03-31 RX ADMIN — Medication 6: at 06:53

## 2020-03-31 RX ADMIN — Medication 100 MEQ/KG/HR: at 15:00

## 2020-03-31 NOTE — BRIEF OPERATIVE NOTE - NSICDXBRIEFPROCEDURE_GEN_ALL_CORE_FT
PROCEDURES:  End ileostomy 31-Mar-2020 20:40:07  Ciarra Lau  Open subtotal colectomy 31-Mar-2020 20:39:55  Ciarra Lau

## 2020-03-31 NOTE — PROGRESS NOTE ADULT - SUBJECTIVE AND OBJECTIVE BOX
INTERVAL HPI/OVERNIGHT EVENTS: Ovn coude placed, good UOP, bolused 1L for underfilling of LV. Given 10 units lantus and started SSI. This AM pt states he is feeling more abdominal pain in comparison to yesterday. Endorses small BM this AM, no flatus. Denies n/v/cp/sob.     MEDICATIONS  (STANDING):  aspirin enteric coated 81 milliGRAM(s) Oral daily  calcium gluconate IVPB 1 Gram(s) IV Intermittent once  dextrose 5%. 1000 milliLiter(s) (50 mL/Hr) IV Continuous <Continuous>  dextrose 50% Injectable 12.5 Gram(s) IV Push once  dextrose 50% Injectable 25 Gram(s) IV Push once  dextrose 50% Injectable 25 Gram(s) IV Push once  heparin  Injectable 5000 Unit(s) SubCutaneous every 8 hours  insulin lispro (HumaLOG) corrective regimen sliding scale   SubCutaneous Before meals and at bedtime  insulin regular  human recombinant. 5 Unit(s) IV Push once  piperacillin/tazobactam IVPB.. 3.375 Gram(s) IV Intermittent every 6 hours  sodium chloride 0.9%. 1000 milliLiter(s) (150 mL/Hr) IV Continuous <Continuous>  thiamine IVPB 500 milliGRAM(s) IV Intermittent every 8 hours    MEDICATIONS  (PRN):  dextrose 40% Gel 15 Gram(s) Oral once PRN Blood Glucose LESS THAN 70 milliGRAM(s)/deciliter  glucagon  Injectable 1 milliGRAM(s) IntraMuscular once PRN Glucose LESS THAN 70 milligrams/deciliter  HYDROmorphone  Injectable 0.5 milliGRAM(s) IV Push every 6 hours PRN Severe Pain (7 - 10)  oxyCODONE    IR 5 milliGRAM(s) Oral every 6 hours PRN Moderate Pain (4 - 6)      Vital Signs Last 24 Hrs  T(C): 36.6 (31 Mar 2020 06:00), Max: 36.6 (31 Mar 2020 01:00)  T(F): 97.8 (31 Mar 2020 06:00), Max: 97.9 (31 Mar 2020 01:00)  HR: 103 (31 Mar 2020 08:00) (62 - 113)  BP: 119/97 (31 Mar 2020 08:00) (85/54 - 174/84)  BP(mean): 105 (31 Mar 2020 08:00) (71 - 120)  RR: 22 (31 Mar 2020 08:00) (18 - 36)  SpO2: 96% (31 Mar 2020 08:00) (91% - 100%)    PHYSICAL EXAM:      Constitutional: A&Ox3    Respiratory: non labored breathing, no respiratory distress    Cardiovascular: NSR, RRR    Gastrointestinal: abdomen soft, distended, tender to palpation diffusely. Tympanitic, +BS             Extremities: (-) edema                  I&O's Detail    30 Mar 2020 07:01  -  31 Mar 2020 07:00  --------------------------------------------------------  IN:    IV PiggyBack: 350 mL    Sodium Chloride 0.9% IV Bolus: 2000 mL    sodium chloride 0.9% with potassium chloride 20 mEq/L: 300 mL    sodium chloride 0.9%.: 1500 mL  Total IN: 4150 mL    OUT:    Indwelling Catheter - Urethral: 845 mL    Nasoenteral Tube: 150 mL  Total OUT: 995 mL    Total NET: 3155 mL      31 Mar 2020 07:01  -  31 Mar 2020 08:22  --------------------------------------------------------  IN:    IV PiggyBack: 100 mL  Total IN: 100 mL    OUT:  Total OUT: 0 mL    Total NET: 100 mL          LABS:                        13.8   12.03 )-----------( 192      ( 31 Mar 2020 05:58 )             40.6     03-31    127<L>  |  98  |  23  ----------------------------<  282<H>  6.0<H>   |  16<L>  |  1.33<H>    Ca    9.5      31 Mar 2020 05:58  Phos  4.7     03  Mg     1.7         TPro  8.2  /  Alb  4.8  /  TBili  0.6  /  DBili  x   /  AST  64<H>  /  ALT  43  /  AlkPhos  244<H>  03-30    PT/INR - ( 30 Mar 2020 10:35 )   PT: 11.3 sec;   INR: 1.02          PTT - ( 30 Mar 2020 10:35 )  PTT:25.5 sec  Urinalysis Basic - ( 31 Mar 2020 02:20 )    Color: Yellow / Appearance: Clear / S.025 / pH: x  Gluc: x / Ketone: Trace mg/dL  / Bili: Negative / Urobili: 0.2 E.U./dL   Blood: x / Protein: 30 mg/dL / Nitrite: NEGATIVE   Leuk Esterase: NEGATIVE / RBC: 5-10 /HPF / WBC < 5 /HPF   Sq Epi: x / Non Sq Epi: 5-10 /HPF / Bacteria: Present /HPF        RADIOLOGY & ADDITIONAL STUDIES:

## 2020-03-31 NOTE — PROGRESS NOTE ADULT - SUBJECTIVE AND OBJECTIVE BOX
SUBJECTIVE:   Patient says that abdomen distention is bothering him more this morning than yesterday   Still no nausea   Still having bowel function     MEDICATIONS  (STANDING):  aspirin enteric coated 81 milliGRAM(s) Oral daily  dextrose 5%. 1000 milliLiter(s) (50 mL/Hr) IV Continuous <Continuous>  dextrose 50% Injectable 12.5 Gram(s) IV Push once  dextrose 50% Injectable 25 Gram(s) IV Push once  dextrose 50% Injectable 25 Gram(s) IV Push once  heparin  Injectable 5000 Unit(s) SubCutaneous every 8 hours  insulin lispro (HumaLOG) corrective regimen sliding scale   SubCutaneous Before meals and at bedtime  piperacillin/tazobactam IVPB.. 3.375 Gram(s) IV Intermittent every 6 hours  sodium chloride 0.9%. 1000 milliLiter(s) (150 mL/Hr) IV Continuous <Continuous>  thiamine IVPB 500 milliGRAM(s) IV Intermittent every 8 hours    MEDICATIONS  (PRN):  dextrose 40% Gel 15 Gram(s) Oral once PRN Blood Glucose LESS THAN 70 milliGRAM(s)/deciliter  glucagon  Injectable 1 milliGRAM(s) IntraMuscular once PRN Glucose LESS THAN 70 milligrams/deciliter  HYDROmorphone  Injectable 0.5 milliGRAM(s) IV Push every 6 hours PRN Severe Pain (7 - 10)  oxyCODONE    IR 5 milliGRAM(s) Oral every 6 hours PRN Moderate Pain (4 - 6)        Vital Signs Last 24 Hrs  T(C): 36.6 (31 Mar 2020 06:00), Max: 36.6 (31 Mar 2020 01:00)  T(F): 97.8 (31 Mar 2020 06:00), Max: 97.9 (31 Mar 2020 01:00)  HR: 103 (31 Mar 2020 08:00) (62 - 113)  BP: 119/97 (31 Mar 2020 08:00) (85/54 - 174/84)  BP(mean): 105 (31 Mar 2020 08:00) (71 - 120)  RR: 22 (31 Mar 2020 08:00) (18 - 36)  SpO2: 96% (31 Mar 2020 08:00) (91% - 100%)  I&O's Detail    30 Mar 2020 07:01  -  31 Mar 2020 07:00  --------------------------------------------------------  IN:    IV PiggyBack: 350 mL    Sodium Chloride 0.9% IV Bolus: 2000 mL    sodium chloride 0.9% with potassium chloride 20 mEq/L: 300 mL    sodium chloride 0.9%.: 1500 mL  Total IN: 4150 mL    OUT:    Indwelling Catheter - Urethral: 845 mL    Nasoenteral Tube: 150 mL  Total OUT: 995 mL    Total NET: 3155 mL      31 Mar 2020 07:01  -  31 Mar 2020 08:59  --------------------------------------------------------  IN:    IV PiggyBack: 100 mL  Total IN: 100 mL    OUT:  Total OUT: 0 mL    Total NET: 100 mL      PHYSICAL EXAM:  Neuro: awake and alert, no focal deficit   HEENT: normocephalic, no scleral ictera   Pulm: non labored breathing on NC, symmetric chest expansion   CV: regular rate and rhythm, palpable DP pulses b/l    GI: abdomen is distended, non tender to palpation, +hepatomegaly    Rectal exam: no mass   MSK: right toe amputation   Skin: no rash, no wound     LABS:                        13.8   12.03 )-----------( 192      ( 31 Mar 2020 05:58 )             40.6     -    127<L>  |  98  |  23  ----------------------------<  282<H>  6.0<H>   |  16<L>  |  1.33<H>    Ca    9.5      31 Mar 2020 05:58  Phos  4.7       Mg     1.7         TPro  8.2  /  Alb  4.8  /  TBili  0.6  /  DBili  x   /  AST  64<H>  /  ALT  43  /  AlkPhos  244<H>  0330    PT/INR - ( 30 Mar 2020 10:35 )   PT: 11.3 sec;   INR: 1.02          PTT - ( 30 Mar 2020 10:35 )  PTT:25.5 sec  Urinalysis Basic - ( 31 Mar 2020 02:20 )    Color: Yellow / Appearance: Clear / S.025 / pH: x  Gluc: x / Ketone: Trace mg/dL  / Bili: Negative / Urobili: 0.2 E.U./dL   Blood: x / Protein: 30 mg/dL / Nitrite: NEGATIVE   Leuk Esterase: NEGATIVE / RBC: 5-10 /HPF / WBC < 5 /HPF   Sq Epi: x / Non Sq Epi: 5-10 /HPF / Bacteria: Present /HPF        RADIOLOGY & ADDITIONAL STUDIES:

## 2020-03-31 NOTE — BRIEF OPERATIVE NOTE - OPERATION/FINDINGS
Subtotal colectomy. Abdomen entered via midline laparotomy. Initial inspection revealed copious ascites and distended, necrotic colon but healthy small bowel. Colon mobilized from cecum toward hepatic flexure. TI stapled across using EndoGIA stapler tan load. Descending colon, sigmoid colon, splenic flexure, and finally the upper rectum mobilized to healthy tissue. Rectum divided using Contour stapler. Subtotal colectomy specimen removed. Abdomen irrigated until suction fluid cleared. Hemostasis achieved. FloSeal placed on raw surface in RUQ. End ileostomy brought through fascia in RLQ. Fascia closed using #1 looped PDS x 2. Ileostomy brooked in the usual fashion.

## 2020-03-31 NOTE — PROGRESS NOTE ADULT - ASSESSMENT
59 y/o M with PMH of HTN, DM complicated by neuropathy, EtOH liver cirrhosis, presenting w/ Uriah's and pneumatosis admitted to SICU for hemodynamic monitoring.    Neuro: CT head negative for bleed or fracture. Currently awake and alert, no issue.   CV: HDS. High BP, will contact PCP regarding home meds   Pulm: Good sats on RA   FENGI: NPO/NG, NS @150. Abdominal distention but continued bowel function. Will discuss plan with surgery   : martínez ([address])  ID: zosyn (3/30 - )  Endo: ISS - Will restart patient's home regimen   FEN: NS @ 150. 1g Ca gluconate / 5u insuline for hyperkalemia   PPx: SCDs, SQH  PT/OT: not ordered 59 y/o M with PMH of HTN, DM complicated by neuropathy, EtOH liver cirrhosis, presenting w/ Uriah's and pneumatosis admitted to SICU for hemodynamic monitoring.    Neuro: CT head negative for bleed or fracture. Currently awake and alert, no issue.   CV: HDS. High BP, will contact PCP regarding home meds   Pulm: Good sats on RA   FENGI: NPO/NG, NS @150. Abdominal distention but continued bowel function. Will discuss plan with surgery   : martínez ([address])  ID: zosyn (3/30 - )  Endo: ISS - Will restart patient's home regimen   FEN: NS @ 150. 1g Ca gluconate / 5u insuline for hyperkalemia   PPx: SCDs, SQH  PT/OT: not ordered      SICU CHIEF ADDENDUM:  61 yo M with diabetes and alcoholic cirrhosis, presenting with megacolon and pneumatosis of cecum for 3 days. Tachycardic and increasingly tachypneic. Abdomen distended and tender. Lactate 2.8, K 6.0. CRP 42. Echo shows EF 65%. Main issues are peritonitis, hyperkalemia, and DKA. Calcium gluconate and IV insulin for hyperkalemia, improved from 6.0 to 5.6. Switching NS to sodium bicarb for acidosis. Insulin drip for hyperglycemia. OR today for colectomy with possible ostomy with general surgery, Dr. Wakefield, now. Get post op labs to check cbc, electrolytes, and lactate. Before the OR, he will get nasal swab to r/o COVID-19 because of possible suspicious opacities on CXR (noted by ED staff at [facility]). If negative, can proceed to regular OR. If positive, go to OR 26. Discussed with surgery team and SICU attending, Dr. Montgomery.

## 2020-03-31 NOTE — PROGRESS NOTE ADULT - ASSESSMENT
59 y/o M with PMH of HTN, DM complicated by neuropathy, EtOH liver cirrhosis, presenting w/ Uriah's and pneumatosis admitted to SICU for hemodynamic monitoring.    Neuro: CT head negative for bleed or fracture  CV: normotensive goals  Pulm: resp hygiene   FENGI: NPO, NS @150  : martínez ([address])  ID: zosyn (3/30 - )  Endo: ISS  PPx: SCDs, SQH  PT/OT: not ordered  Care per SICU

## 2020-03-31 NOTE — PROGRESS NOTE ADULT - SUBJECTIVE AND OBJECTIVE BOX
60 year old male admitted by ACS service yesterday with abdominal distension and pain with ct scan that showed colonic dilation with pneumatosis  history of cirrhosis who was admitted yesterday  on rounds in icu has massive distension  also peritoneal signs and respiratory rate in 30 s  with peritoneal signs distension   lactate 8 to 2 now 2.8  have discussed need to possilble iliostomy  and also may have to leave open and have closed  also high risk secondary to his cirrosis

## 2020-04-01 LAB
ALBUMIN SERPL ELPH-MCNC: 2.2 G/DL — LOW (ref 3.3–5)
ALBUMIN SERPL ELPH-MCNC: 2.3 G/DL — LOW (ref 3.3–5)
ALP SERPL-CCNC: 79 U/L — SIGNIFICANT CHANGE UP (ref 40–120)
ALP SERPL-CCNC: 82 U/L — SIGNIFICANT CHANGE UP (ref 40–120)
ALT FLD-CCNC: 15 U/L — SIGNIFICANT CHANGE UP (ref 10–45)
ALT FLD-CCNC: 17 U/L — SIGNIFICANT CHANGE UP (ref 10–45)
ANION GAP SERPL CALC-SCNC: 7 MMOL/L — SIGNIFICANT CHANGE UP (ref 5–17)
ANION GAP SERPL CALC-SCNC: 8 MMOL/L — SIGNIFICANT CHANGE UP (ref 5–17)
APTT BLD: 35.1 SEC — SIGNIFICANT CHANGE UP (ref 27.5–36.3)
AST SERPL-CCNC: 27 U/L — SIGNIFICANT CHANGE UP (ref 10–40)
AST SERPL-CCNC: 32 U/L — SIGNIFICANT CHANGE UP (ref 10–40)
BASE EXCESS BLDA CALC-SCNC: -4.7 MMOL/L — LOW (ref -2–3)
BASE EXCESS BLDA CALC-SCNC: -7.6 MMOL/L — LOW (ref -2–3)
BILIRUB DIRECT SERPL-MCNC: 0.3 MG/DL — HIGH (ref 0–0.2)
BILIRUB INDIRECT FLD-MCNC: 0.1 MG/DL — LOW (ref 0.2–1)
BILIRUB SERPL-MCNC: 0.4 MG/DL — SIGNIFICANT CHANGE UP (ref 0.2–1.2)
BILIRUB SERPL-MCNC: 0.4 MG/DL — SIGNIFICANT CHANGE UP (ref 0.2–1.2)
BUN SERPL-MCNC: 18 MG/DL — SIGNIFICANT CHANGE UP (ref 7–23)
BUN SERPL-MCNC: 20 MG/DL — SIGNIFICANT CHANGE UP (ref 7–23)
CALCIUM SERPL-MCNC: 7.4 MG/DL — LOW (ref 8.4–10.5)
CALCIUM SERPL-MCNC: 7.6 MG/DL — LOW (ref 8.4–10.5)
CHLORIDE SERPL-SCNC: 103 MMOL/L — SIGNIFICANT CHANGE UP (ref 96–108)
CHLORIDE SERPL-SCNC: 105 MMOL/L — SIGNIFICANT CHANGE UP (ref 96–108)
CO2 SERPL-SCNC: 19 MMOL/L — LOW (ref 22–31)
CO2 SERPL-SCNC: 21 MMOL/L — LOW (ref 22–31)
CREAT SERPL-MCNC: 1.12 MG/DL — SIGNIFICANT CHANGE UP (ref 0.5–1.3)
CREAT SERPL-MCNC: 1.19 MG/DL — SIGNIFICANT CHANGE UP (ref 0.5–1.3)
GAS PNL BLDA: SIGNIFICANT CHANGE UP
GLUCOSE BLDC GLUCOMTR-MCNC: 208 MG/DL — HIGH (ref 70–99)
GLUCOSE BLDC GLUCOMTR-MCNC: 214 MG/DL — HIGH (ref 70–99)
GLUCOSE BLDC GLUCOMTR-MCNC: 224 MG/DL — HIGH (ref 70–99)
GLUCOSE BLDC GLUCOMTR-MCNC: 235 MG/DL — HIGH (ref 70–99)
GLUCOSE SERPL-MCNC: 232 MG/DL — HIGH (ref 70–99)
GLUCOSE SERPL-MCNC: 235 MG/DL — HIGH (ref 70–99)
HCO3 BLDA-SCNC: 17 MMOL/L — LOW (ref 21–28)
HCO3 BLDA-SCNC: 20 MMOL/L — LOW (ref 21–28)
HCT VFR BLD CALC: 22 % — LOW (ref 39–50)
HCT VFR BLD CALC: 22.5 % — LOW (ref 39–50)
HCT VFR BLD CALC: 23.2 % — LOW (ref 39–50)
HGB BLD-MCNC: 7.6 G/DL — LOW (ref 13–17)
HGB BLD-MCNC: 7.6 G/DL — LOW (ref 13–17)
HGB BLD-MCNC: 8 G/DL — LOW (ref 13–17)
INR BLD: 1.24 — HIGH (ref 0.88–1.16)
LACTATE SERPL-SCNC: 1.4 MMOL/L — SIGNIFICANT CHANGE UP (ref 0.5–2)
MAGNESIUM SERPL-MCNC: 1.6 MG/DL — SIGNIFICANT CHANGE UP (ref 1.6–2.6)
MAGNESIUM SERPL-MCNC: 2 MG/DL — SIGNIFICANT CHANGE UP (ref 1.6–2.6)
MCHC RBC-ENTMCNC: 29.7 PG — SIGNIFICANT CHANGE UP (ref 27–34)
MCHC RBC-ENTMCNC: 30 PG — SIGNIFICANT CHANGE UP (ref 27–34)
MCHC RBC-ENTMCNC: 30.4 PG — SIGNIFICANT CHANGE UP (ref 27–34)
MCHC RBC-ENTMCNC: 33.8 GM/DL — SIGNIFICANT CHANGE UP (ref 32–36)
MCHC RBC-ENTMCNC: 34.5 GM/DL — SIGNIFICANT CHANGE UP (ref 32–36)
MCHC RBC-ENTMCNC: 34.5 GM/DL — SIGNIFICANT CHANGE UP (ref 32–36)
MCV RBC AUTO: 86.9 FL — SIGNIFICANT CHANGE UP (ref 80–100)
MCV RBC AUTO: 87.9 FL — SIGNIFICANT CHANGE UP (ref 80–100)
MCV RBC AUTO: 88 FL — SIGNIFICANT CHANGE UP (ref 80–100)
NRBC # BLD: 0 /100 WBCS — SIGNIFICANT CHANGE UP (ref 0–0)
PCO2 BLDA: 33 MMHG — LOW (ref 35–48)
PCO2 BLDA: 35 MMHG — SIGNIFICANT CHANGE UP (ref 35–48)
PH BLDA: 7.34 — LOW (ref 7.35–7.45)
PH BLDA: 7.37 — SIGNIFICANT CHANGE UP (ref 7.35–7.45)
PHOSPHATE SERPL-MCNC: 2.5 MG/DL — SIGNIFICANT CHANGE UP (ref 2.5–4.5)
PHOSPHATE SERPL-MCNC: 2.6 MG/DL — SIGNIFICANT CHANGE UP (ref 2.5–4.5)
PLATELET # BLD AUTO: 113 K/UL — LOW (ref 150–400)
PLATELET # BLD AUTO: 120 K/UL — LOW (ref 150–400)
PLATELET # BLD AUTO: 128 K/UL — LOW (ref 150–400)
PO2 BLDA: 150 MMHG — HIGH (ref 83–108)
PO2 BLDA: 191 MMHG — HIGH (ref 83–108)
POTASSIUM SERPL-MCNC: 4.3 MMOL/L — SIGNIFICANT CHANGE UP (ref 3.5–5.3)
POTASSIUM SERPL-MCNC: 4.5 MMOL/L — SIGNIFICANT CHANGE UP (ref 3.5–5.3)
POTASSIUM SERPL-SCNC: 4.3 MMOL/L — SIGNIFICANT CHANGE UP (ref 3.5–5.3)
POTASSIUM SERPL-SCNC: 4.5 MMOL/L — SIGNIFICANT CHANGE UP (ref 3.5–5.3)
PROT SERPL-MCNC: 4.1 G/DL — LOW (ref 6–8.3)
PROT SERPL-MCNC: 4.7 G/DL — LOW (ref 6–8.3)
PROTHROM AB SERPL-ACNC: 14.2 SEC — HIGH (ref 10–12.9)
RBC # BLD: 2.5 M/UL — LOW (ref 4.2–5.8)
RBC # BLD: 2.56 M/UL — LOW (ref 4.2–5.8)
RBC # BLD: 2.67 M/UL — LOW (ref 4.2–5.8)
RBC # FLD: 13 % — SIGNIFICANT CHANGE UP (ref 10.3–14.5)
RBC # FLD: 13.1 % — SIGNIFICANT CHANGE UP (ref 10.3–14.5)
RBC # FLD: 13.2 % — SIGNIFICANT CHANGE UP (ref 10.3–14.5)
SAO2 % BLDA: 99 % — SIGNIFICANT CHANGE UP (ref 95–100)
SAO2 % BLDA: 99 % — SIGNIFICANT CHANGE UP (ref 95–100)
SODIUM SERPL-SCNC: 131 MMOL/L — LOW (ref 135–145)
SODIUM SERPL-SCNC: 132 MMOL/L — LOW (ref 135–145)
WBC # BLD: 2.07 K/UL — LOW (ref 3.8–10.5)
WBC # BLD: 3.23 K/UL — LOW (ref 3.8–10.5)
WBC # BLD: 4.16 K/UL — SIGNIFICANT CHANGE UP (ref 3.8–10.5)
WBC # FLD AUTO: 2.07 K/UL — LOW (ref 3.8–10.5)
WBC # FLD AUTO: 3.23 K/UL — LOW (ref 3.8–10.5)
WBC # FLD AUTO: 4.16 K/UL — SIGNIFICANT CHANGE UP (ref 3.8–10.5)

## 2020-04-01 PROCEDURE — 99233 SBSQ HOSP IP/OBS HIGH 50: CPT | Mod: GC

## 2020-04-01 PROCEDURE — 71045 X-RAY EXAM CHEST 1 VIEW: CPT | Mod: 26

## 2020-04-01 RX ORDER — HYDROMORPHONE HYDROCHLORIDE 2 MG/ML
1 INJECTION INTRAMUSCULAR; INTRAVENOUS; SUBCUTANEOUS
Refills: 0 | Status: DISCONTINUED | OUTPATIENT
Start: 2020-04-01 | End: 2020-04-04

## 2020-04-01 RX ORDER — LEVOTHYROXINE SODIUM 125 MCG
40 TABLET ORAL AT BEDTIME
Refills: 0 | Status: DISCONTINUED | OUTPATIENT
Start: 2020-04-01 | End: 2020-04-15

## 2020-04-01 RX ORDER — ATORVASTATIN CALCIUM 80 MG/1
10 TABLET, FILM COATED ORAL AT BEDTIME
Refills: 0 | Status: DISCONTINUED | OUTPATIENT
Start: 2020-04-01 | End: 2020-04-19

## 2020-04-01 RX ORDER — MAGNESIUM SULFATE 500 MG/ML
2 VIAL (ML) INJECTION ONCE
Refills: 0 | Status: COMPLETED | OUTPATIENT
Start: 2020-04-01 | End: 2020-04-01

## 2020-04-01 RX ORDER — LABETALOL HCL 100 MG
10 TABLET ORAL ONCE
Refills: 0 | Status: COMPLETED | OUTPATIENT
Start: 2020-04-01 | End: 2020-04-01

## 2020-04-01 RX ORDER — HYDROMORPHONE HYDROCHLORIDE 2 MG/ML
0.5 INJECTION INTRAMUSCULAR; INTRAVENOUS; SUBCUTANEOUS
Refills: 0 | Status: DISCONTINUED | OUTPATIENT
Start: 2020-04-01 | End: 2020-04-07

## 2020-04-01 RX ORDER — SODIUM CHLORIDE 9 MG/ML
1000 INJECTION INTRAMUSCULAR; INTRAVENOUS; SUBCUTANEOUS ONCE
Refills: 0 | Status: COMPLETED | OUTPATIENT
Start: 2020-04-01 | End: 2020-04-01

## 2020-04-01 RX ORDER — SODIUM CHLORIDE 9 MG/ML
1000 INJECTION INTRAMUSCULAR; INTRAVENOUS; SUBCUTANEOUS
Refills: 0 | Status: DISCONTINUED | OUTPATIENT
Start: 2020-04-01 | End: 2020-04-01

## 2020-04-01 RX ORDER — INSULIN GLARGINE 100 [IU]/ML
14 INJECTION, SOLUTION SUBCUTANEOUS ONCE
Refills: 0 | Status: COMPLETED | OUTPATIENT
Start: 2020-04-01 | End: 2020-04-01

## 2020-04-01 RX ORDER — INSULIN DETEMIR 100/ML (3)
14 INSULIN PEN (ML) SUBCUTANEOUS ONCE
Refills: 0 | Status: DISCONTINUED | OUTPATIENT
Start: 2020-04-01 | End: 2020-04-01

## 2020-04-01 RX ORDER — LEVOTHYROXINE SODIUM 125 MCG
50 TABLET ORAL DAILY
Refills: 0 | Status: DISCONTINUED | OUTPATIENT
Start: 2020-04-01 | End: 2020-04-01

## 2020-04-01 RX ADMIN — HEPARIN SODIUM 5000 UNIT(S): 5000 INJECTION INTRAVENOUS; SUBCUTANEOUS at 22:00

## 2020-04-01 RX ADMIN — Medication 81 MILLIGRAM(S): at 15:50

## 2020-04-01 RX ADMIN — Medication 1.25 MILLIGRAM(S): at 21:00

## 2020-04-01 RX ADMIN — Medication 62.5 MILLIMOLE(S): at 08:30

## 2020-04-01 RX ADMIN — PIPERACILLIN AND TAZOBACTAM 200 GRAM(S): 4; .5 INJECTION, POWDER, LYOPHILIZED, FOR SOLUTION INTRAVENOUS at 22:00

## 2020-04-01 RX ADMIN — HYDROMORPHONE HYDROCHLORIDE 1 MILLIGRAM(S): 2 INJECTION INTRAMUSCULAR; INTRAVENOUS; SUBCUTANEOUS at 22:56

## 2020-04-01 RX ADMIN — SODIUM CHLORIDE 4000 MILLILITER(S): 9 INJECTION INTRAMUSCULAR; INTRAVENOUS; SUBCUTANEOUS at 01:19

## 2020-04-01 RX ADMIN — PIPERACILLIN AND TAZOBACTAM 200 GRAM(S): 4; .5 INJECTION, POWDER, LYOPHILIZED, FOR SOLUTION INTRAVENOUS at 16:04

## 2020-04-01 RX ADMIN — Medication 62.5 MILLIMOLE(S): at 19:21

## 2020-04-01 RX ADMIN — PIPERACILLIN AND TAZOBACTAM 200 GRAM(S): 4; .5 INJECTION, POWDER, LYOPHILIZED, FOR SOLUTION INTRAVENOUS at 09:53

## 2020-04-01 RX ADMIN — Medication 105 MILLIGRAM(S): at 14:14

## 2020-04-01 RX ADMIN — Medication 10 MILLIGRAM(S): at 15:00

## 2020-04-01 RX ADMIN — Medication 105 MILLIGRAM(S): at 05:07

## 2020-04-01 RX ADMIN — HEPARIN SODIUM 5000 UNIT(S): 5000 INJECTION INTRAVENOUS; SUBCUTANEOUS at 14:14

## 2020-04-01 RX ADMIN — HYDROMORPHONE HYDROCHLORIDE 1 MILLIGRAM(S): 2 INJECTION INTRAMUSCULAR; INTRAVENOUS; SUBCUTANEOUS at 14:00

## 2020-04-01 RX ADMIN — CHLORHEXIDINE GLUCONATE 15 MILLILITER(S): 213 SOLUTION TOPICAL at 19:08

## 2020-04-01 RX ADMIN — Medication 4: at 19:04

## 2020-04-01 RX ADMIN — ATORVASTATIN CALCIUM 10 MILLIGRAM(S): 80 TABLET, FILM COATED ORAL at 21:55

## 2020-04-01 RX ADMIN — Medication 4: at 11:20

## 2020-04-01 RX ADMIN — HEPARIN SODIUM 5000 UNIT(S): 5000 INJECTION INTRAVENOUS; SUBCUTANEOUS at 05:44

## 2020-04-01 RX ADMIN — Medication 100 MEQ/KG/HR: at 14:00

## 2020-04-01 RX ADMIN — PIPERACILLIN AND TAZOBACTAM 200 GRAM(S): 4; .5 INJECTION, POWDER, LYOPHILIZED, FOR SOLUTION INTRAVENOUS at 04:03

## 2020-04-01 RX ADMIN — INSULIN GLARGINE 14 UNIT(S): 100 INJECTION, SOLUTION SUBCUTANEOUS at 20:53

## 2020-04-01 RX ADMIN — SODIUM CHLORIDE 100 MILLILITER(S): 9 INJECTION INTRAMUSCULAR; INTRAVENOUS; SUBCUTANEOUS at 00:45

## 2020-04-01 RX ADMIN — Medication 4: at 21:06

## 2020-04-01 RX ADMIN — CHLORHEXIDINE GLUCONATE 15 MILLILITER(S): 213 SOLUTION TOPICAL at 05:39

## 2020-04-01 RX ADMIN — Medication 4: at 07:17

## 2020-04-01 RX ADMIN — SODIUM CHLORIDE 6000 MILLILITER(S): 9 INJECTION INTRAMUSCULAR; INTRAVENOUS; SUBCUTANEOUS at 00:14

## 2020-04-01 RX ADMIN — Medication 40 MICROGRAM(S): at 22:11

## 2020-04-01 RX ADMIN — Medication 50 GRAM(S): at 07:21

## 2020-04-01 NOTE — DIETITIAN INITIAL EVALUATION ADULT. - OTHER INFO
59 y/o M with PMH of HTN, DM complicated by neuropathy, EtOH liver cirrhosis, s/p subtotal colectomy and end ileostomy on 3/31 for toxic megacolon, kept intubated overnight. Currently on CPAP, Propofol running at 5.5mL/hr, received 2L overnight for low BP to which he responded, no pressors required at this time, . No ostomy function yet, abdomen distended/firm but improving per RN, +NGT to suction (250mL output from midnight to ~11AM). No apparent pain/distress at this time (pt sedated). Kendell score 17. Unable to obtain information regarding diet/wt hx PTA as pt intubated and no family at bedside. Unable to conduct a face to face interview or nutrition-focused physical exam due to limited contact restrictions related to their medical condition and isolation precautions. Please see below for full nutritional recommendations- d/w team. RD to monitor and f/u per protocol.

## 2020-04-01 NOTE — DIETITIAN INITIAL EVALUATION ADULT. - PERTINENT MEDS FT
Zosyn, humalog, Na phos, Fentanyl, Propofol @5.5mL/hr (145 kcal from lipid), Nabicarb infusion, hydromorphone, thiamine

## 2020-04-01 NOTE — PROGRESS NOTE ADULT - ASSESSMENT
59 y/o M with PMH of HTN, DM complicated by neuropathy, EtOH liver cirrhosis, s/p subtotal colectomy for toxic megacolon, kept intubated overnight. Currently on CPAP, HDS afebrile, no ostomy function, monitoring urinary output, has improved with fluid bolus.     -CPAP trial, plan to extubate  -Awaiting return of bowel function  -Continue Abx  -Continue NPO with NGT  -Care as per SICU

## 2020-04-01 NOTE — DIETITIAN INITIAL EVALUATION ADULT. - ADD RECOMMEND
diet towards low fiber, CSTCHO, low Na diet post bedside RN dysphagia screen (monitor need for SLP eval)

## 2020-04-01 NOTE — DIETITIAN INITIAL EVALUATION ADULT. - NAME AND PHONE
1. If pt remains intubated, recommend Glucerna 1.5 start at 10mL/hr increase 10-20mL q4h or as tolerated to goal 50mL/hr x24h via NGT (1200mL TV, 1800 kcal (1945 kcal with current Propofol rate), 99gm protein, 910mL free water, 120% RDI vitamin/mineral, ~29kcal/kg IBW, ~1.5gm protein/kg IBW)- water flushes per team 2. Order for volume based feeding protocol, monitor tolerance, maintain aspiration precautions 3. Adjust TF regimen based on Propofol rate 4. If able to extubate, recommend advance

## 2020-04-01 NOTE — PROGRESS NOTE ADULT - SUBJECTIVE AND OBJECTIVE BOX
Seen and examined at bedside. Low UOP overnight, responded to IVF. Denies f/c/s/cp/sob/palp/lh. Following commands, on CPAP.     Vital Signs Last 24 Hrs  T(C): 36.6 (2020 11:00), Max: 36.7 (2020 02:00)  T(F): 97.9 (2020 11:00), Max: 98 (2020 02:00)  HR: 96 (2020 11:) (96 - 118)  BP: 139/65 (2020 11:00) (97/52 - 157/82)  BP(mean): 94 (2020 11:00) (66 - 112)  RR: 10 (:00) (10 - 34)  SpO2: 100% (2020 11:00) (88% - 100%)  CAPILLARY BLOOD GLUCOSE  POCT Blood Glucose.: 224 mg/dL (2020 10:59)  POCT Blood Glucose.: 235 mg/dL (2020 06:49)  POCT Blood Glucose.: 133 mg/dL (31 Mar 2020 21:08)  POCT Blood Glucose.: 149 mg/dL (31 Mar 2020 19:26)  POCT Blood Glucose.: 159 mg/dL (31 Mar 2020 15:56)  POCT Blood Glucose.: 160 mg/dL (31 Mar 2020 15:19)  POCT Blood Glucose.: 193 mg/dL (31 Mar 2020 14:05)  POCT Blood Glucose.: 198 mg/dL (31 Mar 2020 13:03)     @ : @ 07:00  --------------------------------------------------------  IN: 4592 mL / OUT: 2390 mL / NET: 2202 mL     @ :  -   @ 12:03  --------------------------------------------------------  IN: 487.5 mL / OUT: 200 mL / NET: 287.5 mL    Mode: CPAP with PS  FiO2: 40  PEEP: 5  PS: 10  MAP: 3  PIP: 13    aspirin enteric coated 81 milliGRAM(s) Oral daily  chlorhexidine 0.12% Liquid 15 milliLiter(s) Oral Mucosa every 12 hours  dextrose 40% Gel 15 Gram(s) Oral once PRN  dextrose 5%. 1000 milliLiter(s) IV Continuous <Continuous>  dextrose 50% Injectable 12.5 Gram(s) IV Push once  dextrose 50% Injectable 25 Gram(s) IV Push once  dextrose 50% Injectable 25 Gram(s) IV Push once  fentaNYL   Infusion. 0.5 MICROgram(s)/kG/Hr IV Continuous <Continuous>  glucagon  Injectable 1 milliGRAM(s) IntraMuscular once PRN  heparin  Injectable 5000 Unit(s) SubCutaneous every 8 hours  HYDROmorphone  Injectable 0.5 milliGRAM(s) IV Push every 6 hours PRN  insulin lispro (HumaLOG) corrective regimen sliding scale   SubCutaneous Before meals and at bedtime  piperacillin/tazobactam IVPB.. 3.375 Gram(s) IV Intermittent every 6 hours  propofol Infusion 9.188 MICROgram(s)/kG/Min IV Continuous <Continuous>  sodium bicarbonate  Infusion 0.083 mEq/kG/Hr IV Continuous <Continuous>  thiamine IVPB 500 milliGRAM(s) IV Intermittent every 8 hours    LABS:  ABG - ( 2020 01:20 )  pH, Arterial: 7.34  pH, Blood: x     /  pCO2: 33    /  pO2: 150   / HCO3: 17    / Base Excess: -7.6  /  SaO2: 99        CBC Full  -  ( 2020 05:30 )  WBC Count : 3.23 K/uL  RBC Count : 2.67 M/uL  Hemoglobin : 8.0 g/dL  Hematocrit : 23.2 %  Platelet Count - Automated : 120 K/uL  Mean Cell Volume : 86.9 fl  Mean Cell Hemoglobin : 30.0 pg  Mean Cell Hemoglobin Concentration : 34.5 gm/dL  Auto Neutrophil # : x  Auto Lymphocyte # : x  Auto Monocyte # : x  Auto Eosinophil # : x  Auto Basophil # : x  Auto Neutrophil % : x  Auto Lymphocyte % : x  Auto Monocyte % : x  Auto Eosinophil % : x  Auto Basophil % : x        132<L>  |  105  |  20  ----------------------------<  232<H>  4.5   |  19<L>  |  1.19    Ca    7.4<L>      2020 05:30  Phos  2.5     04-  Mg     1.6     04-    TPro  4.1<L>  /  Alb  2.2<L>  /  TBili  0.4  /  DBili  0.3<H>  /  AST  27  /  ALT  15  /  AlkPhos  82  04-    PT/INR - ( 31 Mar 2020 21:21 )   PT: 17.9 sec;   INR: 1.55       PTT - ( 31 Mar 2020 21:21 )  PTT:38.2 sec  Urinalysis Basic - ( 31 Mar 2020 02:20 )    Color: Yellow / Appearance: Clear / S.025 / pH: x  Gluc: x / Ketone: Trace mg/dL  / Bili: Negative / Urobili: 0.2 E.U./dL   Blood: x / Protein: 30 mg/dL / Nitrite: NEGATIVE   Leuk Esterase: NEGATIVE / RBC: 5-10 /HPF / WBC < 5 /HPF   Sq Epi: x / Non Sq Epi: 5-10 /HPF / Bacteria: Present /HPF

## 2020-04-01 NOTE — PROGRESS NOTE ADULT - ASSESSMENT
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  Now s/p sub total colectomy and end ileostomy on 3/31  Currently stable, received 2L overnight for low BP to which he responded     Neuro: CT head negative for bleed or fracture. Sedated but following commands    CV: HDS. No pressors   Pulm: Intubated, CPAP trial    FENGI: NPO/NG, Ileostomy pink, no OP yet    : soliz ([address])  ID: zosyn (3/30 - )  Endo: ISS   FEN: NaBicarb @ 75  PPx: SCDs, SQH  PT/OT: not ordered  Lines: RAMY Hutchison (3/31--) 61 y/o M with PMH of HTN, DM complicated by neuropathy, EtOH liver cirrhosis, presenting w/ pneumatosis admitted to SICU for hemodynamic monitoring.  Now s/p sub total colectomy and end ileostomy on 3/31  Currently stable, received 2L overnight for low BP to which he responded     Neuro: CT head negative for bleed or fracture. Sedated but following commands    CV: HDS. No pressors   Pulm: Intubated, CPAP trial    FENGI: NPO/NG, Ileostomy pink, no OP yet    : soliz ([address])  ID: zosyn (3/30 - )  Endo: ISS   FEN: NaBicarb @ 75  PPx: SCDs, SQH  PT/OT: not ordered  Lines: RAMY Hutchison (3/31--)    SICU Chief addendum:  Recovering well since subtotal colectomy with end ileostomy yesterday. Lab work improving. Performed well with CPAP today, successfully extubated. Keep NPO with NGT and abx. Continue NaHCO3 for acidosis (improving). Watch for return of ostomy function. Will reach out to his pharmacy for his home meds as we advance his care. Plan discussed with gen surg and Dr. Montgomery.     - Jose Estrella MD, PGY-4

## 2020-04-01 NOTE — DIETITIAN INITIAL EVALUATION ADULT. - ENERGY NEEDS
Ht (3/31): 170.2cm, Wt (3/31): 90.7kg, IBW: 67.3kg +/-10%, %IBW: 134%, BMI: 31.3   IBW used to calculate energy needs due to pt's current body weight exceeding 100% of IBW in ICU setting. Needs adjusted for vent.

## 2020-04-01 NOTE — PROGRESS NOTE ADULT - SUBJECTIVE AND OBJECTIVE BOX
SUBJECTIVE:   Patient sedated / inubated   Interactive and following commands   Received 2L of fluids overnight for low BP to which he responded     MEDICATIONS  (STANDING):  aspirin enteric coated 81 milliGRAM(s) Oral daily  dextrose 5%. 1000 milliLiter(s) (50 mL/Hr) IV Continuous <Continuous>  dextrose 50% Injectable 12.5 Gram(s) IV Push once  dextrose 50% Injectable 25 Gram(s) IV Push once  dextrose 50% Injectable 25 Gram(s) IV Push once  heparin  Injectable 5000 Unit(s) SubCutaneous every 8 hours  insulin lispro (HumaLOG) corrective regimen sliding scale   SubCutaneous Before meals and at bedtime  piperacillin/tazobactam IVPB.. 3.375 Gram(s) IV Intermittent every 6 hours  sodium chloride 0.9%. 1000 milliLiter(s) (150 mL/Hr) IV Continuous <Continuous>  thiamine IVPB 500 milliGRAM(s) IV Intermittent every 8 hours    MEDICATIONS  (PRN):  dextrose 40% Gel 15 Gram(s) Oral once PRN Blood Glucose LESS THAN 70 milliGRAM(s)/deciliter  glucagon  Injectable 1 milliGRAM(s) IntraMuscular once PRN Glucose LESS THAN 70 milligrams/deciliter  HYDROmorphone  Injectable 0.5 milliGRAM(s) IV Push every 6 hours PRN Severe Pain (7 - 10)  oxyCODONE    IR 5 milliGRAM(s) Oral every 6 hours PRN Moderate Pain (4 - 6)        Vital Signs Last 24 Hrs  T(C): 36.6 (31 Mar 2020 06:00), Max: 36.6 (31 Mar 2020 01:00)  T(F): 97.8 (31 Mar 2020 06:00), Max: 97.9 (31 Mar 2020 01:00)  HR: 103 (31 Mar 2020 08:00) (62 - 113)  BP: 119/97 (31 Mar 2020 08:00) (85/54 - 174/84)  BP(mean): 105 (31 Mar 2020 08:00) (71 - 120)  RR: 22 (31 Mar 2020 08:00) (18 - 36)  SpO2: 96% (31 Mar 2020 08:00) (91% - 100%)  I&O's Detail    30 Mar 2020 07:01  -  31 Mar 2020 07:00  --------------------------------------------------------  IN:    IV PiggyBack: 350 mL    Sodium Chloride 0.9% IV Bolus: 2000 mL    sodium chloride 0.9% with potassium chloride 20 mEq/L: 300 mL    sodium chloride 0.9%.: 1500 mL  Total IN: 4150 mL    OUT:    Indwelling Catheter - Urethral: 845 mL    Nasoenteral Tube: 150 mL  Total OUT: 995 mL    Total NET: 3155 mL      31 Mar 2020 07:01  -  31 Mar 2020 08:59  --------------------------------------------------------  IN:    IV PiggyBack: 100 mL  Total IN: 100 mL    OUT:  Total OUT: 0 mL    Total NET: 100 mL      PHYSICAL EXAM:  Neuro: awake and alert, no focal deficit   HEENT: normocephalic, no scleral ictera   Pulm: intubated, coarse sounds   CV: regular rate and rhythm, no murmur to auscultation, no carotid bruit     GI: abdomen is softer, mildly distended, non tender, ileostomy pink   MSK: right toe amputation   Skin: no rash, no wound     LABS:                        13.8   12.03 )-----------( 192      ( 31 Mar 2020 05:58 )             40.6     -31    127<L>  |  98  |  23  ----------------------------<  282<H>  6.0<H>   |  16<L>  |  1.33<H>    Ca    9.5      31 Mar 2020 05:58  Phos  4.7       Mg     1.7         TPro  8.2  /  Alb  4.8  /  TBili  0.6  /  DBili  x   /  AST  64<H>  /  ALT  43  /  AlkPhos  244<H>  30    PT/INR - ( 30 Mar 2020 10:35 )   PT: 11.3 sec;   INR: 1.02          PTT - ( 30 Mar 2020 10:35 )  PTT:25.5 sec  Urinalysis Basic - ( 31 Mar 2020 02:20 )    Color: Yellow / Appearance: Clear / S.025 / pH: x  Gluc: x / Ketone: Trace mg/dL  / Bili: Negative / Urobili: 0.2 E.U./dL   Blood: x / Protein: 30 mg/dL / Nitrite: NEGATIVE   Leuk Esterase: NEGATIVE / RBC: 5-10 /HPF / WBC < 5 /HPF   Sq Epi: x / Non Sq Epi: 5-10 /HPF / Bacteria: Present /HPF        RADIOLOGY & ADDITIONAL STUDIES:

## 2020-04-02 LAB
AMMONIA BLD-MCNC: 28 UMOL/L — SIGNIFICANT CHANGE UP (ref 11–55)
ANION GAP SERPL CALC-SCNC: 8 MMOL/L — SIGNIFICANT CHANGE UP (ref 5–17)
BUN SERPL-MCNC: 14 MG/DL — SIGNIFICANT CHANGE UP (ref 7–23)
CALCIUM SERPL-MCNC: 7.9 MG/DL — LOW (ref 8.4–10.5)
CHLORIDE SERPL-SCNC: 103 MMOL/L — SIGNIFICANT CHANGE UP (ref 96–108)
CO2 SERPL-SCNC: 25 MMOL/L — SIGNIFICANT CHANGE UP (ref 22–31)
CREAT SERPL-MCNC: 0.94 MG/DL — SIGNIFICANT CHANGE UP (ref 0.5–1.3)
GLUCOSE BLDC GLUCOMTR-MCNC: 143 MG/DL — HIGH (ref 70–99)
GLUCOSE BLDC GLUCOMTR-MCNC: 149 MG/DL — HIGH (ref 70–99)
GLUCOSE BLDC GLUCOMTR-MCNC: 168 MG/DL — HIGH (ref 70–99)
GLUCOSE BLDC GLUCOMTR-MCNC: 176 MG/DL — HIGH (ref 70–99)
GLUCOSE SERPL-MCNC: 160 MG/DL — HIGH (ref 70–99)
HCT VFR BLD CALC: 19.4 % — CRITICAL LOW (ref 39–50)
HCT VFR BLD CALC: 27.3 % — LOW (ref 39–50)
HGB BLD-MCNC: 6.6 G/DL — CRITICAL LOW (ref 13–17)
HGB BLD-MCNC: 9.6 G/DL — LOW (ref 13–17)
MAGNESIUM SERPL-MCNC: 1.7 MG/DL — SIGNIFICANT CHANGE UP (ref 1.6–2.6)
MCHC RBC-ENTMCNC: 29.9 PG — SIGNIFICANT CHANGE UP (ref 27–34)
MCHC RBC-ENTMCNC: 30 PG — SIGNIFICANT CHANGE UP (ref 27–34)
MCHC RBC-ENTMCNC: 34 GM/DL — SIGNIFICANT CHANGE UP (ref 32–36)
MCHC RBC-ENTMCNC: 35.2 GM/DL — SIGNIFICANT CHANGE UP (ref 32–36)
MCV RBC AUTO: 85.3 FL — SIGNIFICANT CHANGE UP (ref 80–100)
MCV RBC AUTO: 87.8 FL — SIGNIFICANT CHANGE UP (ref 80–100)
NRBC # BLD: 0 /100 WBCS — SIGNIFICANT CHANGE UP (ref 0–0)
NRBC # BLD: 0 /100 WBCS — SIGNIFICANT CHANGE UP (ref 0–0)
PHOSPHATE SERPL-MCNC: 2.1 MG/DL — LOW (ref 2.5–4.5)
PLATELET # BLD AUTO: 115 K/UL — LOW (ref 150–400)
PLATELET # BLD AUTO: 121 K/UL — LOW (ref 150–400)
POTASSIUM SERPL-MCNC: 3.5 MMOL/L — SIGNIFICANT CHANGE UP (ref 3.5–5.3)
POTASSIUM SERPL-SCNC: 3.5 MMOL/L — SIGNIFICANT CHANGE UP (ref 3.5–5.3)
RBC # BLD: 2.21 M/UL — LOW (ref 4.2–5.8)
RBC # BLD: 3.2 M/UL — LOW (ref 4.2–5.8)
RBC # FLD: 13.1 % — SIGNIFICANT CHANGE UP (ref 10.3–14.5)
RBC # FLD: 13.2 % — SIGNIFICANT CHANGE UP (ref 10.3–14.5)
SODIUM SERPL-SCNC: 136 MMOL/L — SIGNIFICANT CHANGE UP (ref 135–145)
WBC # BLD: 5.92 K/UL — SIGNIFICANT CHANGE UP (ref 3.8–10.5)
WBC # BLD: 8.95 K/UL — SIGNIFICANT CHANGE UP (ref 3.8–10.5)
WBC # FLD AUTO: 5.92 K/UL — SIGNIFICANT CHANGE UP (ref 3.8–10.5)
WBC # FLD AUTO: 8.95 K/UL — SIGNIFICANT CHANGE UP (ref 3.8–10.5)

## 2020-04-02 PROCEDURE — 71045 X-RAY EXAM CHEST 1 VIEW: CPT | Mod: 26

## 2020-04-02 PROCEDURE — 71045 X-RAY EXAM CHEST 1 VIEW: CPT | Mod: 26,77

## 2020-04-02 PROCEDURE — 99233 SBSQ HOSP IP/OBS HIGH 50: CPT | Mod: GC

## 2020-04-02 RX ORDER — POTASSIUM PHOSPHATE, MONOBASIC POTASSIUM PHOSPHATE, DIBASIC 236; 224 MG/ML; MG/ML
30 INJECTION, SOLUTION INTRAVENOUS ONCE
Refills: 0 | Status: COMPLETED | OUTPATIENT
Start: 2020-04-02 | End: 2020-04-02

## 2020-04-02 RX ORDER — LABETALOL HCL 100 MG
10 TABLET ORAL EVERY 6 HOURS
Refills: 0 | Status: DISCONTINUED | OUTPATIENT
Start: 2020-04-02 | End: 2020-04-05

## 2020-04-02 RX ORDER — LABETALOL HCL 100 MG
10 TABLET ORAL ONCE
Refills: 0 | Status: COMPLETED | OUTPATIENT
Start: 2020-04-02 | End: 2020-04-02

## 2020-04-02 RX ORDER — MAGNESIUM SULFATE 500 MG/ML
2 VIAL (ML) INJECTION ONCE
Refills: 0 | Status: COMPLETED | OUTPATIENT
Start: 2020-04-02 | End: 2020-04-02

## 2020-04-02 RX ORDER — POTASSIUM CHLORIDE 20 MEQ
20 PACKET (EA) ORAL
Refills: 0 | Status: COMPLETED | OUTPATIENT
Start: 2020-04-02 | End: 2020-04-02

## 2020-04-02 RX ORDER — SODIUM CHLORIDE 9 MG/ML
1000 INJECTION, SOLUTION INTRAVENOUS
Refills: 0 | Status: DISCONTINUED | OUTPATIENT
Start: 2020-04-02 | End: 2020-04-03

## 2020-04-02 RX ORDER — LABETALOL HCL 100 MG
10 TABLET ORAL EVERY 6 HOURS
Refills: 0 | Status: DISCONTINUED | OUTPATIENT
Start: 2020-04-02 | End: 2020-04-02

## 2020-04-02 RX ADMIN — Medication 10 MILLIGRAM(S): at 14:30

## 2020-04-02 RX ADMIN — Medication 50 GRAM(S): at 07:13

## 2020-04-02 RX ADMIN — Medication 10 MILLIGRAM(S): at 07:12

## 2020-04-02 RX ADMIN — HYDROMORPHONE HYDROCHLORIDE 0.5 MILLIGRAM(S): 2 INJECTION INTRAMUSCULAR; INTRAVENOUS; SUBCUTANEOUS at 04:10

## 2020-04-02 RX ADMIN — Medication 1.25 MILLIGRAM(S): at 18:45

## 2020-04-02 RX ADMIN — Medication 50 MILLIEQUIVALENT(S): at 11:31

## 2020-04-02 RX ADMIN — HEPARIN SODIUM 5000 UNIT(S): 5000 INJECTION INTRAVENOUS; SUBCUTANEOUS at 14:10

## 2020-04-02 RX ADMIN — PIPERACILLIN AND TAZOBACTAM 200 GRAM(S): 4; .5 INJECTION, POWDER, LYOPHILIZED, FOR SOLUTION INTRAVENOUS at 22:57

## 2020-04-02 RX ADMIN — POTASSIUM PHOSPHATE, MONOBASIC POTASSIUM PHOSPHATE, DIBASIC 83.33 MILLIMOLE(S): 236; 224 INJECTION, SOLUTION INTRAVENOUS at 09:16

## 2020-04-02 RX ADMIN — Medication 1.25 MILLIGRAM(S): at 14:05

## 2020-04-02 RX ADMIN — CHLORHEXIDINE GLUCONATE 15 MILLILITER(S): 213 SOLUTION TOPICAL at 06:23

## 2020-04-02 RX ADMIN — HEPARIN SODIUM 5000 UNIT(S): 5000 INJECTION INTRAVENOUS; SUBCUTANEOUS at 22:59

## 2020-04-02 RX ADMIN — Medication 1.25 MILLIGRAM(S): at 03:07

## 2020-04-02 RX ADMIN — Medication 2: at 16:20

## 2020-04-02 RX ADMIN — HEPARIN SODIUM 5000 UNIT(S): 5000 INJECTION INTRAVENOUS; SUBCUTANEOUS at 06:23

## 2020-04-02 RX ADMIN — Medication 1.25 MILLIGRAM(S): at 10:41

## 2020-04-02 RX ADMIN — PIPERACILLIN AND TAZOBACTAM 200 GRAM(S): 4; .5 INJECTION, POWDER, LYOPHILIZED, FOR SOLUTION INTRAVENOUS at 16:23

## 2020-04-02 RX ADMIN — Medication 2: at 23:46

## 2020-04-02 RX ADMIN — Medication 50 MILLIEQUIVALENT(S): at 15:39

## 2020-04-02 RX ADMIN — PIPERACILLIN AND TAZOBACTAM 200 GRAM(S): 4; .5 INJECTION, POWDER, LYOPHILIZED, FOR SOLUTION INTRAVENOUS at 04:00

## 2020-04-02 RX ADMIN — CHLORHEXIDINE GLUCONATE 15 MILLILITER(S): 213 SOLUTION TOPICAL at 19:50

## 2020-04-02 RX ADMIN — SODIUM CHLORIDE 100 MILLILITER(S): 9 INJECTION, SOLUTION INTRAVENOUS at 10:29

## 2020-04-02 RX ADMIN — PIPERACILLIN AND TAZOBACTAM 200 GRAM(S): 4; .5 INJECTION, POWDER, LYOPHILIZED, FOR SOLUTION INTRAVENOUS at 10:41

## 2020-04-02 RX ADMIN — Medication 1.25 MILLIGRAM(S): at 23:35

## 2020-04-02 NOTE — PROGRESS NOTE ADULT - SUBJECTIVE AND OBJECTIVE BOX
SUBJECTIVE:   Extubated yesterday and has been doing well   Pain well controlled   Pulled out art line and NG overnight but is not agitated now    MEDICATIONS  (STANDING):  aspirin enteric coated 81 milliGRAM(s) Oral daily  dextrose 5%. 1000 milliLiter(s) (50 mL/Hr) IV Continuous <Continuous>  dextrose 50% Injectable 12.5 Gram(s) IV Push once  dextrose 50% Injectable 25 Gram(s) IV Push once  dextrose 50% Injectable 25 Gram(s) IV Push once  heparin  Injectable 5000 Unit(s) SubCutaneous every 8 hours  insulin lispro (HumaLOG) corrective regimen sliding scale   SubCutaneous Before meals and at bedtime  piperacillin/tazobactam IVPB.. 3.375 Gram(s) IV Intermittent every 6 hours  sodium chloride 0.9%. 1000 milliLiter(s) (150 mL/Hr) IV Continuous <Continuous>  thiamine IVPB 500 milliGRAM(s) IV Intermittent every 8 hours    MEDICATIONS  (PRN):  dextrose 40% Gel 15 Gram(s) Oral once PRN Blood Glucose LESS THAN 70 milliGRAM(s)/deciliter  glucagon  Injectable 1 milliGRAM(s) IntraMuscular once PRN Glucose LESS THAN 70 milligrams/deciliter  HYDROmorphone  Injectable 0.5 milliGRAM(s) IV Push every 6 hours PRN Severe Pain (7 - 10)  oxyCODONE    IR 5 milliGRAM(s) Oral every 6 hours PRN Moderate Pain (4 - 6)        Vital Signs Last 24 Hrs  T(C): 36.6 (31 Mar 2020 06:00), Max: 36.6 (31 Mar 2020 01:00)  T(F): 97.8 (31 Mar 2020 06:00), Max: 97.9 (31 Mar 2020 01:00)  HR: 103 (31 Mar 2020 08:00) (62 - 113)  BP: 119/97 (31 Mar 2020 08:00) (85/54 - 174/84)  BP(mean): 105 (31 Mar 2020 08:00) (71 - 120)  RR: 22 (31 Mar 2020 08:00) (18 - 36)  SpO2: 96% (31 Mar 2020 08:00) (91% - 100%)  I&O's Detail    30 Mar 2020 07:01  -  31 Mar 2020 07:00  --------------------------------------------------------  IN:    IV PiggyBack: 350 mL    Sodium Chloride 0.9% IV Bolus: 2000 mL    sodium chloride 0.9% with potassium chloride 20 mEq/L: 300 mL    sodium chloride 0.9%.: 1500 mL  Total IN: 4150 mL    OUT:    Indwelling Catheter - Urethral: 845 mL    Nasoenteral Tube: 150 mL  Total OUT: 995 mL    Total NET: 3155 mL      31 Mar 2020 07:01  -  31 Mar 2020 08:59  --------------------------------------------------------  IN:    IV PiggyBack: 100 mL  Total IN: 100 mL    OUT:  Total OUT: 0 mL    Total NET: 100 mL      PHYSICAL EXAM:  Neuro: awake and alert, no focal deficit   HEENT: normocephalic, no scleral ictera   Pulm: non labored on NC, coarse sounds   CV: regular rate and rhythm, no murmur to auscultation, no carotid bruit     GI: abdomen is soft, mildly distended, non tender, ileostomy pink / no OP yet   MSK: right toe amputation   Skin: no rash, no wound     LABS:                        13.8   12.03 )-----------( 192      ( 31 Mar 2020 05:58 )             40.6         127<L>  |  98  |  23  ----------------------------<  282<H>  6.0<H>   |  16<L>  |  1.33<H>    Ca    9.5      31 Mar 2020 05:58  Phos  4.7       Mg     1.7         TPro  8.2  /  Alb  4.8  /  TBili  0.6  /  DBili  x   /  AST  64<H>  /  ALT  43  /  AlkPhos  244<H>  30    PT/INR - ( 30 Mar 2020 10:35 )   PT: 11.3 sec;   INR: 1.02          PTT - ( 30 Mar 2020 10:35 )  PTT:25.5 sec  Urinalysis Basic - ( 31 Mar 2020 02:20 )    Color: Yellow / Appearance: Clear / S.025 / pH: x  Gluc: x / Ketone: Trace mg/dL  / Bili: Negative / Urobili: 0.2 E.U./dL   Blood: x / Protein: 30 mg/dL / Nitrite: NEGATIVE   Leuk Esterase: NEGATIVE / RBC: 5-10 /HPF / WBC < 5 /HPF   Sq Epi: x / Non Sq Epi: 5-10 /HPF / Bacteria: Present /HPF        RADIOLOGY & ADDITIONAL STUDIES:

## 2020-04-02 NOTE — PROGRESS NOTE ADULT - SUBJECTIVE AND OBJECTIVE BOX
INTERVAL HPI/OVERNIGHT EVENTS: Overnight, med rec completed, restarted synthroid and statin; gave 14 units lantus, started enaliprilat q6h (BP 170s); NGT partially out, re-inserted; AM Hgb 6.6, ordered 1pRBC. This AM pt is alert but not coherent. Was not in any acute distress, but unable to participate in subjective exam.     STATUS POST:  open subtotal colectomy and end ileostomy creation.     POST OPERATIVE DAY #: 2    MEDICATIONS  (STANDING):  aspirin enteric coated 81 milliGRAM(s) Oral daily  atorvastatin 10 milliGRAM(s) Oral at bedtime  chlorhexidine 0.12% Liquid 15 milliLiter(s) Oral Mucosa every 12 hours  dextrose 5%. 1000 milliLiter(s) (50 mL/Hr) IV Continuous <Continuous>  dextrose 50% Injectable 12.5 Gram(s) IV Push once  dextrose 50% Injectable 25 Gram(s) IV Push once  dextrose 50% Injectable 25 Gram(s) IV Push once  enalaprilat Injectable 1.25 milliGRAM(s) IV Push every 6 hours  heparin  Injectable 5000 Unit(s) SubCutaneous every 8 hours  insulin lispro (HumaLOG) corrective regimen sliding scale   SubCutaneous Before meals and at bedtime  levothyroxine Injectable 40 MICROGram(s) IV Push at bedtime  piperacillin/tazobactam IVPB.. 3.375 Gram(s) IV Intermittent every 6 hours  potassium chloride  20 mEq/100 mL IVPB 20 milliEquivalent(s) IV Intermittent every 2 hours  potassium phosphate IVPB 30 milliMole(s) IV Intermittent once  sodium bicarbonate  Infusion 0.083 mEq/kG/Hr (100 mL/Hr) IV Continuous <Continuous>    MEDICATIONS  (PRN):  dextrose 40% Gel 15 Gram(s) Oral once PRN Blood Glucose LESS THAN 70 milliGRAM(s)/deciliter  glucagon  Injectable 1 milliGRAM(s) IntraMuscular once PRN Glucose LESS THAN 70 milligrams/deciliter  HYDROmorphone  Injectable 0.5 milliGRAM(s) IV Push every 3 hours PRN Moderate Pain (4 - 6)  HYDROmorphone  Injectable 1 milliGRAM(s) IV Push every 3 hours PRN Severe Pain (7 - 10)      Vital Signs Last 24 Hrs  T(C): 36.7 (02 Apr 2020 07:27), Max: 37.5 (01 Apr 2020 17:38)  T(F): 98 (02 Apr 2020 07:27), Max: 99.5 (01 Apr 2020 17:38)  HR: 83 (02 Apr 2020 08:00) (80 - 113)  BP: 175/79 (02 Apr 2020 08:00) (127/60 - 189/82)  BP(mean): 113 (02 Apr 2020 08:00) (91 - 118)  RR: 21 (02 Apr 2020 08:00) (10 - 30)  SpO2: 99% (02 Apr 2020 08:00) (88% - 100%)    PHYSICAL EXAM:      Constitutional: A&Ox3    Respiratory: non labored breathing, no respiratory distress    Cardiovascular: NSR, RRR    Gastrointestinal: soft, mildly distended, tender to palpation near incision site.                  Incision: c/d/i, ostomy pink with scant output    Genitourinary: soliz catheter in place    Extremities: (-) edema                  I&O's Detail    01 Apr 2020 07:01  -  02 Apr 2020 07:00  --------------------------------------------------------  IN:    fentaNYL Infusion.: 37 mL    IV PiggyBack: 362.5 mL    propofol Infusion: 41.9 mL    sodium bicarbonate  Infusion: 2400 mL    Solution: 250 mL  Total IN: 3091.4 mL    OUT:    Ileostomy: 65 mL    Indwelling Catheter - Urethral: 2245 mL    Nasoenteral Tube: 625 mL  Total OUT: 2935 mL    Total NET: 156.4 mL      02 Apr 2020 07:01  -  02 Apr 2020 08:28  --------------------------------------------------------  IN:    sodium bicarbonate  Infusion: 100 mL  Total IN: 100 mL    OUT:    Indwelling Catheter - Urethral: 150 mL  Total OUT: 150 mL    Total NET: -50 mL          LABS:                        6.6    5.92  )-----------( 115      ( 02 Apr 2020 04:48 )             19.4     04-02    136  |  103  |  14  ----------------------------<  160<H>  3.5   |  25  |  0.94    Ca    7.9<L>      02 Apr 2020 04:48  Phos  2.1     04-02  Mg     1.7     04-02    TPro  4.7<L>  /  Alb  2.3<L>  /  TBili  0.4  /  DBili  x   /  AST  32  /  ALT  17  /  AlkPhos  79  04-01    PT/INR - ( 01 Apr 2020 12:14 )   PT: 14.2 sec;   INR: 1.24          PTT - ( 01 Apr 2020 12:14 )  PTT:35.1 sec      RADIOLOGY & ADDITIONAL STUDIES:

## 2020-04-02 NOTE — PROGRESS NOTE ADULT - ASSESSMENT
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  Now s/p sub total colectomy and end ileostomy on 3/31  Currently stable, received 2L overnight for low BP to which he responded     Neuro: CT head negative for bleed or fracture. Sedated but following commands    CV: HDS. Enaliprilat q6h w/ hold parameters  Pulm: Intubated, CPAP trial    FENGI: NPO/NG, Ileostomy pink, no OP yet    : soliz (18 Fr Coude)  ID: zosyn (3/30 - )  Endo: ISS   FEN: NaBicarb @ 75  PPx: SCDs, SQH  PT/OT: not ordered  Lines: RAMY Hutchison (3/31--)

## 2020-04-02 NOTE — PROGRESS NOTE ADULT - ASSESSMENT
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  Now s/p sub total colectomy and end ileostomy on 3/31  Doing well   Hb 6.6 this am, ordered for 1u pRBC  Started lantus, vasotec and IV levothyroxine ON to match home meds     Neuro: CT head negative for bleed or fracture. Sedated but following commands    CV: HDS. No pressors   Pulm: Extubated, doing well on NC  FENGI: NPO/NG, Ileostomy pink, no OP yet    : martínez (18 Fr Coude)  ID: zosyn (3/30 - )  Endo: ISS , lantus 14  FEN: NaBicarb @ 75  PPx: SCDs, SQH  PT/OT: not ordered  Lines: RAMY Hutchison (3/31-4/1)    SICU Chief addendum:  Recovering well since subtotal colectomy with end ileostomy yesterday. Lab work improving. Performed well with CPAP today, successfully extubated. Keep NPO with NGT and abx. Continue NaHCO3 for acidosis (improving). Watch for return of ostomy function. Will reach out to his pharmacy for his home meds as we advance his care. Plan discussed with gen surg and Dr. Montgomery.     - Jose Estrella MD, PGY-4 59 y/o M with PMH of HTN, DM complicated by neuropathy, EtOH liver cirrhosis, presenting w/ pneumatosis admitted to SICU for hemodynamic monitoring.  Now s/p sub total colectomy and end ileostomy on 3/31  Doing well   Hb 6.6 this am, ordered for 1u pRBC  Started lantus, vasotec and IV levothyroxine ON to match home meds     Neuro: CT head negative for bleed or fracture. Sedated but following commands    CV: HDS. No pressors. Vasotec q6, Labetalol 10mg q6 PRN  Pulm: Extubated, doing well on NC  FENGI: NPO/NG, Ileostomy pink, no OP yet    : soliz (18 Fr Coude)  ID: zosyn (3/30 - )  Endo: ISS , lantus 14  FEN: LR @ 100  PPx: SCDs, SQH  PT/OT: not ordered  Lines: RAMY Hutchison (3/31-4/1)    SICU Chief addendum:  Recovering well since subtotal colectomy with end ileostomy yesterday. Lab work improving. Performed well with CPAP today, successfully extubated. Keep NPO with NGT and abx. Continue NaHCO3 for acidosis (improving). Watch for return of ostomy function. Will reach out to his pharmacy for his home meds as we advance his care. Plan discussed with gen surg and Dr. Montgomery.     - Jose Estrella MD, PGY-4 59 y/o M with PMH of HTN, DM complicated by neuropathy, EtOH liver cirrhosis, presenting w/ pneumatosis admitted to SICU for hemodynamic monitoring.  Now s/p sub total colectomy and end ileostomy on 3/31  Doing well   Hb 6.6 this am, ordered for 1u pRBC  Started lantus, vasotec and IV levothyroxine ON to match home meds     Neuro: CT head negative for bleed or fracture. Sedated but following commands    CV: HDS. No pressors. Vasotec q6, Labetalol 10mg q6 PRN  Pulm: Extubated, doing well on NC  FENGI: NPO/NG, Ileostomy pink, no OP yet    : soliz (18 Fr Coude)  ID: zosyn (3/30 - )  Endo: ISS , lantus 14  FEN: LR @ 100  PPx: SCDs, SQH  PT/OT: not ordered  Lines: RAMY Hutchison (3/31-4/1)    SICU Chief addendum:  Continues to recover well from subtotal colectomy with end ileostomy, now POD 2, for necrotic colon. Extubated yesterday, breathing well on nasal cannula. Just sweat from ileostomy, no air or stool yet. Hb low this morning at 6.6, so will give 2 U PRBC per gen surg. Keep NPO with NGT. Added labetalol for BP control. Switch IVF to LR at 100 mL/hr. D/c soliz today. PT consult. F/u evening post-transfusion CBC. Plan discussed with primary gen surg team.    - Jose Estrella MD, PGY-4

## 2020-04-03 LAB
ANION GAP SERPL CALC-SCNC: 14 MMOL/L — SIGNIFICANT CHANGE UP (ref 5–17)
BUN SERPL-MCNC: 10 MG/DL — SIGNIFICANT CHANGE UP (ref 7–23)
CALCIUM SERPL-MCNC: 8.6 MG/DL — SIGNIFICANT CHANGE UP (ref 8.4–10.5)
CHLORIDE SERPL-SCNC: 96 MMOL/L — SIGNIFICANT CHANGE UP (ref 96–108)
CO2 SERPL-SCNC: 26 MMOL/L — SIGNIFICANT CHANGE UP (ref 22–31)
CREAT SERPL-MCNC: 0.73 MG/DL — SIGNIFICANT CHANGE UP (ref 0.5–1.3)
CULTURE RESULTS: SIGNIFICANT CHANGE UP
CULTURE RESULTS: SIGNIFICANT CHANGE UP
GLUCOSE BLDC GLUCOMTR-MCNC: 158 MG/DL — HIGH (ref 70–99)
GLUCOSE BLDC GLUCOMTR-MCNC: 160 MG/DL — HIGH (ref 70–99)
GLUCOSE BLDC GLUCOMTR-MCNC: 198 MG/DL — HIGH (ref 70–99)
GLUCOSE BLDC GLUCOMTR-MCNC: 233 MG/DL — HIGH (ref 70–99)
GLUCOSE SERPL-MCNC: 186 MG/DL — HIGH (ref 70–99)
HCT VFR BLD CALC: 28.5 % — LOW (ref 39–50)
HGB BLD-MCNC: 9.9 G/DL — LOW (ref 13–17)
MAGNESIUM SERPL-MCNC: 1.7 MG/DL — SIGNIFICANT CHANGE UP (ref 1.6–2.6)
MCHC RBC-ENTMCNC: 29.8 PG — SIGNIFICANT CHANGE UP (ref 27–34)
MCHC RBC-ENTMCNC: 34.7 GM/DL — SIGNIFICANT CHANGE UP (ref 32–36)
MCV RBC AUTO: 85.8 FL — SIGNIFICANT CHANGE UP (ref 80–100)
NRBC # BLD: 0 /100 WBCS — SIGNIFICANT CHANGE UP (ref 0–0)
PHOSPHATE SERPL-MCNC: 2.4 MG/DL — LOW (ref 2.5–4.5)
PLATELET # BLD AUTO: 131 K/UL — LOW (ref 150–400)
POTASSIUM SERPL-MCNC: 3.3 MMOL/L — LOW (ref 3.5–5.3)
POTASSIUM SERPL-SCNC: 3.3 MMOL/L — LOW (ref 3.5–5.3)
RBC # BLD: 3.32 M/UL — LOW (ref 4.2–5.8)
RBC # FLD: 13.3 % — SIGNIFICANT CHANGE UP (ref 10.3–14.5)
SODIUM SERPL-SCNC: 136 MMOL/L — SIGNIFICANT CHANGE UP (ref 135–145)
SPECIMEN SOURCE: SIGNIFICANT CHANGE UP
SPECIMEN SOURCE: SIGNIFICANT CHANGE UP
SURGICAL PATHOLOGY STUDY: SIGNIFICANT CHANGE UP
WBC # BLD: 8.49 K/UL — SIGNIFICANT CHANGE UP (ref 3.8–10.5)
WBC # FLD AUTO: 8.49 K/UL — SIGNIFICANT CHANGE UP (ref 3.8–10.5)

## 2020-04-03 PROCEDURE — 71045 X-RAY EXAM CHEST 1 VIEW: CPT | Mod: 26

## 2020-04-03 PROCEDURE — 99233 SBSQ HOSP IP/OBS HIGH 50: CPT | Mod: GC

## 2020-04-03 PROCEDURE — 74019 RADEX ABDOMEN 2 VIEWS: CPT | Mod: 26

## 2020-04-03 RX ORDER — MAGNESIUM SULFATE 500 MG/ML
2 VIAL (ML) INJECTION ONCE
Refills: 0 | Status: COMPLETED | OUTPATIENT
Start: 2020-04-03 | End: 2020-04-03

## 2020-04-03 RX ORDER — HYDRALAZINE HCL 50 MG
5 TABLET ORAL ONCE
Refills: 0 | Status: COMPLETED | OUTPATIENT
Start: 2020-04-03 | End: 2020-04-03

## 2020-04-03 RX ORDER — LABETALOL HCL 100 MG
40 TABLET ORAL ONCE
Refills: 0 | Status: DISCONTINUED | OUTPATIENT
Start: 2020-04-03 | End: 2020-04-03

## 2020-04-03 RX ORDER — AMLODIPINE BESYLATE 2.5 MG/1
10 TABLET ORAL DAILY
Refills: 0 | Status: DISCONTINUED | OUTPATIENT
Start: 2020-04-03 | End: 2020-04-16

## 2020-04-03 RX ORDER — POTASSIUM PHOSPHATE, MONOBASIC POTASSIUM PHOSPHATE, DIBASIC 236; 224 MG/ML; MG/ML
15 INJECTION, SOLUTION INTRAVENOUS ONCE
Refills: 0 | Status: COMPLETED | OUTPATIENT
Start: 2020-04-03 | End: 2020-04-03

## 2020-04-03 RX ORDER — LABETALOL HCL 100 MG
20 TABLET ORAL ONCE
Refills: 0 | Status: COMPLETED | OUTPATIENT
Start: 2020-04-03 | End: 2020-04-03

## 2020-04-03 RX ORDER — LISINOPRIL 2.5 MG/1
20 TABLET ORAL DAILY
Refills: 0 | Status: DISCONTINUED | OUTPATIENT
Start: 2020-04-03 | End: 2020-04-04

## 2020-04-03 RX ORDER — SODIUM CHLORIDE 9 MG/ML
1000 INJECTION, SOLUTION INTRAVENOUS
Refills: 0 | Status: DISCONTINUED | OUTPATIENT
Start: 2020-04-03 | End: 2020-04-04

## 2020-04-03 RX ORDER — POTASSIUM CHLORIDE 20 MEQ
20 PACKET (EA) ORAL
Refills: 0 | Status: COMPLETED | OUTPATIENT
Start: 2020-04-03 | End: 2020-04-03

## 2020-04-03 RX ADMIN — Medication 5 MILLIGRAM(S): at 07:29

## 2020-04-03 RX ADMIN — POTASSIUM PHOSPHATE, MONOBASIC POTASSIUM PHOSPHATE, DIBASIC 63.75 MILLIMOLE(S): 236; 224 INJECTION, SOLUTION INTRAVENOUS at 08:08

## 2020-04-03 RX ADMIN — Medication 50 MILLIEQUIVALENT(S): at 13:01

## 2020-04-03 RX ADMIN — Medication 1.25 MILLIGRAM(S): at 05:14

## 2020-04-03 RX ADMIN — HYDROMORPHONE HYDROCHLORIDE 0.5 MILLIGRAM(S): 2 INJECTION INTRAMUSCULAR; INTRAVENOUS; SUBCUTANEOUS at 12:50

## 2020-04-03 RX ADMIN — Medication 81 MILLIGRAM(S): at 13:58

## 2020-04-03 RX ADMIN — AMLODIPINE BESYLATE 10 MILLIGRAM(S): 2.5 TABLET ORAL at 13:58

## 2020-04-03 RX ADMIN — Medication 4: at 22:00

## 2020-04-03 RX ADMIN — PIPERACILLIN AND TAZOBACTAM 200 GRAM(S): 4; .5 INJECTION, POWDER, LYOPHILIZED, FOR SOLUTION INTRAVENOUS at 22:00

## 2020-04-03 RX ADMIN — CHLORHEXIDINE GLUCONATE 15 MILLILITER(S): 213 SOLUTION TOPICAL at 05:15

## 2020-04-03 RX ADMIN — Medication 10 MILLIGRAM(S): at 01:18

## 2020-04-03 RX ADMIN — Medication 40 MICROGRAM(S): at 02:35

## 2020-04-03 RX ADMIN — Medication 2: at 16:29

## 2020-04-03 RX ADMIN — LISINOPRIL 20 MILLIGRAM(S): 2.5 TABLET ORAL at 13:58

## 2020-04-03 RX ADMIN — PIPERACILLIN AND TAZOBACTAM 200 GRAM(S): 4; .5 INJECTION, POWDER, LYOPHILIZED, FOR SOLUTION INTRAVENOUS at 16:26

## 2020-04-03 RX ADMIN — Medication 40 MICROGRAM(S): at 22:31

## 2020-04-03 RX ADMIN — Medication 20 MILLIGRAM(S): at 02:30

## 2020-04-03 RX ADMIN — PIPERACILLIN AND TAZOBACTAM 200 GRAM(S): 4; .5 INJECTION, POWDER, LYOPHILIZED, FOR SOLUTION INTRAVENOUS at 11:43

## 2020-04-03 RX ADMIN — Medication 2: at 06:02

## 2020-04-03 RX ADMIN — HYDROMORPHONE HYDROCHLORIDE 1 MILLIGRAM(S): 2 INJECTION INTRAMUSCULAR; INTRAVENOUS; SUBCUTANEOUS at 00:44

## 2020-04-03 RX ADMIN — PIPERACILLIN AND TAZOBACTAM 200 GRAM(S): 4; .5 INJECTION, POWDER, LYOPHILIZED, FOR SOLUTION INTRAVENOUS at 03:44

## 2020-04-03 RX ADMIN — Medication 50 MILLIEQUIVALENT(S): at 11:52

## 2020-04-03 RX ADMIN — Medication 50 GRAM(S): at 07:15

## 2020-04-03 RX ADMIN — Medication 2: at 11:40

## 2020-04-03 RX ADMIN — SODIUM CHLORIDE 100 MILLILITER(S): 9 INJECTION, SOLUTION INTRAVENOUS at 14:03

## 2020-04-03 RX ADMIN — Medication 5 MILLIGRAM(S): at 04:44

## 2020-04-03 RX ADMIN — HEPARIN SODIUM 5000 UNIT(S): 5000 INJECTION INTRAVENOUS; SUBCUTANEOUS at 16:09

## 2020-04-03 RX ADMIN — HEPARIN SODIUM 5000 UNIT(S): 5000 INJECTION INTRAVENOUS; SUBCUTANEOUS at 05:14

## 2020-04-03 RX ADMIN — HEPARIN SODIUM 5000 UNIT(S): 5000 INJECTION INTRAVENOUS; SUBCUTANEOUS at 20:00

## 2020-04-03 RX ADMIN — Medication 50 MILLIEQUIVALENT(S): at 08:38

## 2020-04-03 NOTE — PHYSICAL THERAPY INITIAL EVALUATION ADULT - CRITERIA FOR SKILLED THERAPEUTIC INTERVENTIONS
impairments found/functional limitations in following categories/rehab potential/anticipated discharge recommendation/risk reduction/prevention/therapy frequency/predicted duration of therapy intervention

## 2020-04-03 NOTE — PHYSICAL THERAPY INITIAL EVALUATION ADULT - IMPAIRMENTS FOUND, PT EVAL
gait, locomotion, and balance/muscle strength/posture/poor safety awareness/aerobic capacity/endurance/arousal, attention, and cognition

## 2020-04-03 NOTE — PHYSICAL THERAPY INITIAL EVALUATION ADULT - GENERAL OBSERVATIONS, REHAB EVAL
S/p ex lap ileostomy. Pt rcvd semi supine, +bedalarm, +tele, +heplock, +open abd wound gauze scant bloody drainge, +ostomy bag. A&Ox3, laughing at times. Tolerated session fairly, demo modA bed mob, maxA transfer, amb 40ft ModA with RW. Pt left OOB to chair, RN present, lines/drains intact, in NAD. Fim gait=1.

## 2020-04-03 NOTE — PROGRESS NOTE ADULT - SUBJECTIVE AND OBJECTIVE BOX
SUBJECTIVE:   Says feeling well   Pain well controlled   Removed soliz, passed TOV     MEDICATIONS  (STANDING):  aspirin enteric coated 81 milliGRAM(s) Oral daily  dextrose 5%. 1000 milliLiter(s) (50 mL/Hr) IV Continuous <Continuous>  dextrose 50% Injectable 12.5 Gram(s) IV Push once  dextrose 50% Injectable 25 Gram(s) IV Push once  dextrose 50% Injectable 25 Gram(s) IV Push once  heparin  Injectable 5000 Unit(s) SubCutaneous every 8 hours  insulin lispro (HumaLOG) corrective regimen sliding scale   SubCutaneous Before meals and at bedtime  piperacillin/tazobactam IVPB.. 3.375 Gram(s) IV Intermittent every 6 hours  sodium chloride 0.9%. 1000 milliLiter(s) (150 mL/Hr) IV Continuous <Continuous>  thiamine IVPB 500 milliGRAM(s) IV Intermittent every 8 hours    MEDICATIONS  (PRN):  dextrose 40% Gel 15 Gram(s) Oral once PRN Blood Glucose LESS THAN 70 milliGRAM(s)/deciliter  glucagon  Injectable 1 milliGRAM(s) IntraMuscular once PRN Glucose LESS THAN 70 milligrams/deciliter  HYDROmorphone  Injectable 0.5 milliGRAM(s) IV Push every 6 hours PRN Severe Pain (7 - 10)  oxyCODONE    IR 5 milliGRAM(s) Oral every 6 hours PRN Moderate Pain (4 - 6)        Vital Signs Last 24 Hrs  T(C): 36.6 (31 Mar 2020 06:00), Max: 36.6 (31 Mar 2020 01:00)  T(F): 97.8 (31 Mar 2020 06:00), Max: 97.9 (31 Mar 2020 01:00)  HR: 103 (31 Mar 2020 08:00) (62 - 113)  BP: 119/97 (31 Mar 2020 08:00) (85/54 - 174/84)  BP(mean): 105 (31 Mar 2020 08:00) (71 - 120)  RR: 22 (31 Mar 2020 08:00) (18 - 36)  SpO2: 96% (31 Mar 2020 08:00) (91% - 100%)  I&O's Detail    30 Mar 2020 07:01  -  31 Mar 2020 07:00  --------------------------------------------------------  IN:    IV PiggyBack: 350 mL    Sodium Chloride 0.9% IV Bolus: 2000 mL    sodium chloride 0.9% with potassium chloride 20 mEq/L: 300 mL    sodium chloride 0.9%.: 1500 mL  Total IN: 4150 mL    OUT:    Indwelling Catheter - Urethral: 845 mL    Nasoenteral Tube: 150 mL  Total OUT: 995 mL    Total NET: 3155 mL      31 Mar 2020 07:01  -  31 Mar 2020 08:59  --------------------------------------------------------  IN:    IV PiggyBack: 100 mL  Total IN: 100 mL    OUT:  Total OUT: 0 mL    Total NET: 100 mL      PHYSICAL EXAM:  Neuro: awake and alert, no focal deficit   HEENT: normocephalic, no scleral ictera   Pulm: non labored on NC, coarse sounds   CV: regular rate and rhythm, no murmur to auscultation, no carotid bruit     GI: abdomen is soft, mildly distended, non tender, ileostomy pink / no OP yet   MSK: right toe amputation   Skin: no rash, no wound     LABS:                        13.8   12.03 )-----------( 192      ( 31 Mar 2020 05:58 )             40.6     -31    127<L>  |  98  |  23  ----------------------------<  282<H>  6.0<H>   |  16<L>  |  1.33<H>    Ca    9.5      31 Mar 2020 05:58  Phos  4.7       Mg     1.7         TPro  8.2  /  Alb  4.8  /  TBili  0.6  /  DBili  x   /  AST  64<H>  /  ALT  43  /  AlkPhos  244<H>  30    PT/INR - ( 30 Mar 2020 10:35 )   PT: 11.3 sec;   INR: 1.02          PTT - ( 30 Mar 2020 10:35 )  PTT:25.5 sec  Urinalysis Basic - ( 31 Mar 2020 02:20 )    Color: Yellow / Appearance: Clear / S.025 / pH: x  Gluc: x / Ketone: Trace mg/dL  / Bili: Negative / Urobili: 0.2 E.U./dL   Blood: x / Protein: 30 mg/dL / Nitrite: NEGATIVE   Leuk Esterase: NEGATIVE / RBC: 5-10 /HPF / WBC < 5 /HPF   Sq Epi: x / Non Sq Epi: 5-10 /HPF / Bacteria: Present /HPF        RADIOLOGY & ADDITIONAL STUDIES:

## 2020-04-03 NOTE — PHYSICAL THERAPY INITIAL EVALUATION ADULT - IMPAIRMENTS CONTRIBUTING TO GAIT DEVIATIONS, PT EVAL
ataxic/cognition/impaired coordination/pain/L foot outside frame of RW 50% time despite cues and demo/impaired postural control/impaired balance/decreased ROM/decreased strength

## 2020-04-03 NOTE — PROGRESS NOTE ADULT - ASSESSMENT
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  Now s/p sub total colectomy and end ileostomy on 3/31  Doing well   Hb 6.6 yesterday, received 2u pRBC to which he responded   High BP overnight, received 30mg labetalol and 5mg of hydralazine with minimal response     Neuro: CT head negative for bleed or fracture. Awake and alert.   CV: HDS but hypertensive, Vasotec IV q6 and Labetalol, hydralazine PRN, will discuss resuming home meds through NG    Pulm: Extubated, doing well on NC  FENGI: NPO/NG (800 OP),  Ileostomy pink, no OP yet    : d/c martínez 4/2   ID: zosyn (3/30 - )  Endo: ISS , lantus 14  FEN: LR @ 100  PPx: SCDs, SQH  PT/OT: not ordered  Lines: RAMY Hutchison (3/31-4/1)    SICU Chief addendum:  Continues to recover well from subtotal colectomy with end ileostomy, now POD 2, for necrotic colon. Extubated yesterday, breathing well on nasal cannula. Just sweat from ileostomy, no air or stool yet. Hb low this morning at 6.6, so will give 2 U PRBC per gen surg. Keep NPO with NGT. Added labetalol for BP control. Switch IVF to LR at 100 mL/hr. D/c martínez today. PT consult. F/u evening post-transfusion CBC. Plan discussed with primary gen surg team.    - Jose Estrella MD, PGY-4 61 y/o M with PMH of HTN, DM complicated by neuropathy, EtOH liver cirrhosis, presenting w/ pneumatosis admitted to SICU for hemodynamic monitoring.  Now s/p sub total colectomy and end ileostomy on 3/31  Doing well   Hb 6.6 yesterday, received 2u pRBC to which he responded   High BP overnight, received 30mg labetalol and 5mg of hydralazine with minimal response     Neuro: CT head negative for bleed or fracture. Awake and alert.   CV: HDS but hypertensive, Vasotec IV q6 and Labetalol, hydralazine PRN, will discuss resuming home meds through NG    Pulm: Extubated, doing well on NC  FENGI: NPO/NG (800 OP),  Ileostomy pink, no OP yet    : d/c martínez 4/2   ID: zosyn (3/30 - )  Endo: ISS , lantus 14  FEN: LR @ 100  PPx: SCDs, SQH  PT/OT: not ordered  Lines: RAMY Hutchison (3/31-4/1)

## 2020-04-03 NOTE — PHYSICAL THERAPY INITIAL EVALUATION ADULT - DIAGNOSIS, PT EVAL
4I: Impaired Joint Mobility, Motor Function, Muscle Performance, and Range of Motion Associated with Bony or Soft Tissue Surgery , 5A: Primary Prevention/Risk Reduction for Loss of Balance and Falling

## 2020-04-03 NOTE — PROGRESS NOTE ADULT - SUBJECTIVE AND OBJECTIVE BOX
STATUS POST:  End ileostomy  Open subtotal colectomy    Patient seen and examined at bedside. In no acute distress. Denies N/V. NPO. Pain well controlled. Voiding on his own.     OBJECTIVE:    Vital Signs Last 24 Hrs  T(C): 37.1 (02 Apr 2020 13:00), Max: 37.1 (02 Apr 2020 13:00)  T(F): 98.7 (02 Apr 2020 13:00), Max: 98.7 (02 Apr 2020 13:00)  HR: 70 (03 Apr 2020 07:20) (67 - 93)  BP: 189/84 (03 Apr 2020 07:20) (174/87 - 200/87)  BP(mean): 120 (03 Apr 2020 07:20) (114 - 137)  RR: 16 (03 Apr 2020 07:20) (15 - 28)  SpO2: 98% (03 Apr 2020 07:20) (94% - 100%)    I&O's Summary    02 Apr 2020 07:01  -  03 Apr 2020 07:00  --------------------------------------------------------  IN: 2600 mL / OUT: 2335 mL / NET: 265 mL      Physical Exam:  Neuro: awake and alert, no focal deficit   HEENT: normocephalic, no scleral ictera   Pulm: non labored on NC, coarse sounds   CV: regular rate and rhythm, no murmur to auscultation, no carotid bruit     GI: abdomen is soft, mildly distended, non tender, ileostomy pink / no OP yet   MSK: right toe amputation   Skin: no rash, no wound      LABS:                        9.9    8.49  )-----------( 131      ( 03 Apr 2020 05:28 )             28.5     04-03    136  |  96  |  10  ----------------------------<  186<H>  3.3<L>   |  26  |  0.73    Ca    8.6      03 Apr 2020 05:28  Phos  2.4     04-03  Mg     1.7     04-03    TPro  4.7<L>  /  Alb  2.3<L>  /  TBili  0.4  /  DBili  x   /  AST  32  /  ALT  17  /  AlkPhos  79  04-01    PT/INR - ( 01 Apr 2020 12:14 )   PT: 14.2 sec;   INR: 1.24          PTT - ( 01 Apr 2020 12:14 )  PTT:35.1 sec      RADIOLOGY & ADDITIONAL STUDIES:

## 2020-04-03 NOTE — PHYSICAL THERAPY INITIAL EVALUATION ADULT - ADDITIONAL COMMENTS
Pt reports he lives in elevator apartment, alone. Uses rollator inside and outside. Repots able to amb in community several blocks. indpt ADLs/iADLs. reports 3 falls in ~1 year. Inconsistent answers.

## 2020-04-03 NOTE — PHYSICAL THERAPY INITIAL EVALUATION ADULT - PLANNED THERAPY INTERVENTIONS, PT EVAL
balance training/bed mobility training/transfer training/gait training/motor coordination training/postural re-education/strengthening

## 2020-04-03 NOTE — PHYSICAL THERAPY INITIAL EVALUATION ADULT - PERTINENT HX OF CURRENT PROBLEM, REHAB EVAL
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

## 2020-04-04 LAB
ANION GAP SERPL CALC-SCNC: 15 MMOL/L — SIGNIFICANT CHANGE UP (ref 5–17)
ANION GAP SERPL CALC-SCNC: 15 MMOL/L — SIGNIFICANT CHANGE UP (ref 5–17)
BUN SERPL-MCNC: 12 MG/DL — SIGNIFICANT CHANGE UP (ref 7–23)
BUN SERPL-MCNC: 13 MG/DL — SIGNIFICANT CHANGE UP (ref 7–23)
CALCIUM SERPL-MCNC: 8.3 MG/DL — LOW (ref 8.4–10.5)
CALCIUM SERPL-MCNC: 8.6 MG/DL — SIGNIFICANT CHANGE UP (ref 8.4–10.5)
CHLORIDE SERPL-SCNC: 95 MMOL/L — LOW (ref 96–108)
CHLORIDE SERPL-SCNC: 98 MMOL/L — SIGNIFICANT CHANGE UP (ref 96–108)
CO2 SERPL-SCNC: 29 MMOL/L — SIGNIFICANT CHANGE UP (ref 22–31)
CO2 SERPL-SCNC: 30 MMOL/L — SIGNIFICANT CHANGE UP (ref 22–31)
CREAT SERPL-MCNC: 0.74 MG/DL — SIGNIFICANT CHANGE UP (ref 0.5–1.3)
CREAT SERPL-MCNC: 0.79 MG/DL — SIGNIFICANT CHANGE UP (ref 0.5–1.3)
GLUCOSE BLDC GLUCOMTR-MCNC: 225 MG/DL — HIGH (ref 70–99)
GLUCOSE BLDC GLUCOMTR-MCNC: 234 MG/DL — HIGH (ref 70–99)
GLUCOSE BLDC GLUCOMTR-MCNC: 235 MG/DL — HIGH (ref 70–99)
GLUCOSE BLDC GLUCOMTR-MCNC: 243 MG/DL — HIGH (ref 70–99)
GLUCOSE SERPL-MCNC: 233 MG/DL — HIGH (ref 70–99)
GLUCOSE SERPL-MCNC: 315 MG/DL — HIGH (ref 70–99)
HCT VFR BLD CALC: 24.8 % — LOW (ref 39–50)
HGB BLD-MCNC: 8.6 G/DL — LOW (ref 13–17)
MAGNESIUM SERPL-MCNC: 1.7 MG/DL — SIGNIFICANT CHANGE UP (ref 1.6–2.6)
MAGNESIUM SERPL-MCNC: 1.8 MG/DL — SIGNIFICANT CHANGE UP (ref 1.6–2.6)
MCHC RBC-ENTMCNC: 29.7 PG — SIGNIFICANT CHANGE UP (ref 27–34)
MCHC RBC-ENTMCNC: 34.7 GM/DL — SIGNIFICANT CHANGE UP (ref 32–36)
MCV RBC AUTO: 85.5 FL — SIGNIFICANT CHANGE UP (ref 80–100)
NRBC # BLD: 0 /100 WBCS — SIGNIFICANT CHANGE UP (ref 0–0)
PHOSPHATE SERPL-MCNC: 2.3 MG/DL — LOW (ref 2.5–4.5)
PLATELET # BLD AUTO: 133 K/UL — LOW (ref 150–400)
POTASSIUM SERPL-MCNC: 2.8 MMOL/L — CRITICAL LOW (ref 3.5–5.3)
POTASSIUM SERPL-MCNC: 3.1 MMOL/L — LOW (ref 3.5–5.3)
POTASSIUM SERPL-SCNC: 2.8 MMOL/L — CRITICAL LOW (ref 3.5–5.3)
POTASSIUM SERPL-SCNC: 3.1 MMOL/L — LOW (ref 3.5–5.3)
RBC # BLD: 2.9 M/UL — LOW (ref 4.2–5.8)
RBC # FLD: 13.3 % — SIGNIFICANT CHANGE UP (ref 10.3–14.5)
SODIUM SERPL-SCNC: 140 MMOL/L — SIGNIFICANT CHANGE UP (ref 135–145)
SODIUM SERPL-SCNC: 142 MMOL/L — SIGNIFICANT CHANGE UP (ref 135–145)
WBC # BLD: 3.82 K/UL — SIGNIFICANT CHANGE UP (ref 3.8–10.5)
WBC # FLD AUTO: 3.82 K/UL — SIGNIFICANT CHANGE UP (ref 3.8–10.5)

## 2020-04-04 PROCEDURE — 71045 X-RAY EXAM CHEST 1 VIEW: CPT | Mod: 26

## 2020-04-04 PROCEDURE — 99233 SBSQ HOSP IP/OBS HIGH 50: CPT | Mod: GC

## 2020-04-04 RX ORDER — POTASSIUM CHLORIDE 20 MEQ
10 PACKET (EA) ORAL ONCE
Refills: 0 | Status: COMPLETED | OUTPATIENT
Start: 2020-04-04 | End: 2020-04-04

## 2020-04-04 RX ORDER — POTASSIUM CHLORIDE 20 MEQ
10 PACKET (EA) ORAL
Refills: 0 | Status: COMPLETED | OUTPATIENT
Start: 2020-04-04 | End: 2020-04-04

## 2020-04-04 RX ORDER — LISINOPRIL 2.5 MG/1
40 TABLET ORAL DAILY
Refills: 0 | Status: DISCONTINUED | OUTPATIENT
Start: 2020-04-05 | End: 2020-04-12

## 2020-04-04 RX ORDER — POTASSIUM CHLORIDE 20 MEQ
20 PACKET (EA) ORAL
Refills: 0 | Status: COMPLETED | OUTPATIENT
Start: 2020-04-04 | End: 2020-04-04

## 2020-04-04 RX ORDER — SODIUM CHLORIDE 9 MG/ML
1000 INJECTION, SOLUTION INTRAVENOUS
Refills: 0 | Status: DISCONTINUED | OUTPATIENT
Start: 2020-04-04 | End: 2020-04-08

## 2020-04-04 RX ADMIN — Medication 100 MILLIEQUIVALENT(S): at 23:12

## 2020-04-04 RX ADMIN — Medication 50 MILLIEQUIVALENT(S): at 16:07

## 2020-04-04 RX ADMIN — PIPERACILLIN AND TAZOBACTAM 200 GRAM(S): 4; .5 INJECTION, POWDER, LYOPHILIZED, FOR SOLUTION INTRAVENOUS at 22:46

## 2020-04-04 RX ADMIN — Medication 50 MILLIEQUIVALENT(S): at 14:21

## 2020-04-04 RX ADMIN — Medication 100 MILLIEQUIVALENT(S): at 18:29

## 2020-04-04 RX ADMIN — HEPARIN SODIUM 5000 UNIT(S): 5000 INJECTION INTRAVENOUS; SUBCUTANEOUS at 22:43

## 2020-04-04 RX ADMIN — Medication 10 MILLIGRAM(S): at 11:10

## 2020-04-04 RX ADMIN — PIPERACILLIN AND TAZOBACTAM 200 GRAM(S): 4; .5 INJECTION, POWDER, LYOPHILIZED, FOR SOLUTION INTRAVENOUS at 10:22

## 2020-04-04 RX ADMIN — CHLORHEXIDINE GLUCONATE 15 MILLILITER(S): 213 SOLUTION TOPICAL at 07:28

## 2020-04-04 RX ADMIN — HEPARIN SODIUM 5000 UNIT(S): 5000 INJECTION INTRAVENOUS; SUBCUTANEOUS at 06:00

## 2020-04-04 RX ADMIN — HYDROMORPHONE HYDROCHLORIDE 0.5 MILLIGRAM(S): 2 INJECTION INTRAMUSCULAR; INTRAVENOUS; SUBCUTANEOUS at 21:25

## 2020-04-04 RX ADMIN — PIPERACILLIN AND TAZOBACTAM 200 GRAM(S): 4; .5 INJECTION, POWDER, LYOPHILIZED, FOR SOLUTION INTRAVENOUS at 16:06

## 2020-04-04 RX ADMIN — Medication 50 MILLIEQUIVALENT(S): at 12:16

## 2020-04-04 RX ADMIN — Medication 10 MILLIGRAM(S): at 22:47

## 2020-04-04 RX ADMIN — Medication 4: at 11:16

## 2020-04-04 RX ADMIN — Medication 4: at 22:44

## 2020-04-04 RX ADMIN — Medication 40 MICROGRAM(S): at 22:45

## 2020-04-04 RX ADMIN — LISINOPRIL 20 MILLIGRAM(S): 2.5 TABLET ORAL at 07:25

## 2020-04-04 RX ADMIN — HEPARIN SODIUM 5000 UNIT(S): 5000 INJECTION INTRAVENOUS; SUBCUTANEOUS at 14:49

## 2020-04-04 RX ADMIN — Medication 4: at 16:22

## 2020-04-04 RX ADMIN — ATORVASTATIN CALCIUM 10 MILLIGRAM(S): 80 TABLET, FILM COATED ORAL at 22:44

## 2020-04-04 RX ADMIN — ATORVASTATIN CALCIUM 10 MILLIGRAM(S): 80 TABLET, FILM COATED ORAL at 00:31

## 2020-04-04 RX ADMIN — PIPERACILLIN AND TAZOBACTAM 200 GRAM(S): 4; .5 INJECTION, POWDER, LYOPHILIZED, FOR SOLUTION INTRAVENOUS at 04:00

## 2020-04-04 RX ADMIN — Medication 81 MILLIGRAM(S): at 12:22

## 2020-04-04 RX ADMIN — Medication 4: at 08:08

## 2020-04-04 RX ADMIN — AMLODIPINE BESYLATE 10 MILLIGRAM(S): 2.5 TABLET ORAL at 07:26

## 2020-04-04 NOTE — PROGRESS NOTE ADULT - SUBJECTIVE AND OBJECTIVE BOX
SICU DAILY PROGRESS NOTE      INTERVAL HPI / OVERNIGHT EVENTS:   Hypertensive overnight, received 30 mg labetalol and 5 mg hydralazine with minimal response.       MEDICATIONS:  ---NEURO-  HYDROmorphone  Injectable 0.5 milliGRAM(s) IV Push every 3 hours PRN  HYDROmorphone  Injectable 1 milliGRAM(s) IV Push every 3 hours PRN  ---CV-  amLODIPine   Tablet 10 milliGRAM(s) Oral daily  labetalol Injectable 10 milliGRAM(s) IV Push every 6 hours PRN  lisinopril 20 milliGRAM(s) Oral daily  ---PULM-  ---GI/FEN-  dextrose 5% + sodium chloride 0.45%. 1000 milliLiter(s) IV Continuous <Continuous>  dextrose 5%. 1000 milliLiter(s) IV Continuous <Continuous>  ----  ---ID-   piperacillin/tazobactam IVPB.. 3.375 Gram(s) IV Intermittent every 6 hours  ---ENDO-  atorvastatin 10 milliGRAM(s) Oral at bedtime  dextrose 40% Gel 15 Gram(s) Oral once PRN  dextrose 50% Injectable 12.5 Gram(s) IV Push once  dextrose 50% Injectable 25 Gram(s) IV Push once  dextrose 50% Injectable 25 Gram(s) IV Push once  glucagon  Injectable 1 milliGRAM(s) IntraMuscular once PRN  insulin lispro (HumaLOG) corrective regimen sliding scale   SubCutaneous Before meals and at bedtime  levothyroxine Injectable 40 MICROGram(s) IV Push at bedtime  ---HEME-  aspirin enteric coated 81 milliGRAM(s) Oral daily  heparin  Injectable 5000 Unit(s) SubCutaneous every 8 hours  ---OTHER-  chlorhexidine 0.12% Liquid 15 milliLiter(s) Oral Mucosa every 12 hours          ANTIBIOTICS: piperacillin/tazobactam IVPB.. 3.375 Gram(s) IV Intermittent every 6 hours    PRESSORS:   CENTRAL LINE:                                               Remove: NO     Indication: Venous access in critically ill patient.  ARTERIAL LINE:                                              Remove: NO     Indication: Hemodynamic monitoring in critically ill patient.   URINARY CATHETER:                                    Remove: NO     Indication: I/O monitoring in critically ill patient.     VOLUME STATUS:   I&O's Detail    03 Apr 2020 07:01  -  04 Apr 2020 07:00  --------------------------------------------------------  IN:    dextrose 5% + sodium chloride 0.45%.: 1400 mL    IV PiggyBack: 100 mL  Total IN: 1500 mL    OUT:    Ileostomy: 50 mL    Nasoenteral Tube: 1620 mL    Voided: 2400 mL  Total OUT: 4070 mL    Total NET: -2570 mL        ICU Vital Signs Last 24 Hrs  T(C): 37 (03 Apr 2020 09:00), Max: 37 (03 Apr 2020 09:00)  T(F): 98.6 (03 Apr 2020 09:00), Max: 98.6 (03 Apr 2020 09:00)  HR: 85 (04 Apr 2020 04:00) (62 - 88)  BP: 166/72 (04 Apr 2020 04:00) (165/80 - 178/78)  BP(mean): 104 (04 Apr 2020 04:00) (104 - 119)  ABP: --  ABP(mean): --  RR: 20 (04 Apr 2020 04:00) (16 - 30)  SpO2: 95% (04 Apr 2020 04:00) (95% - 99%)      CAPILLARY BLOOD GLUCOSE      POCT Blood Glucose.: 234 mg/dL (04 Apr 2020 06:59)  POCT Blood Glucose.: 233 mg/dL (03 Apr 2020 23:36)  POCT Blood Glucose.: 198 mg/dL (03 Apr 2020 16:24)  POCT Blood Glucose.: 160 mg/dL (03 Apr 2020 11:27)    PHYSICAL EXAM:    LABS:                        9.9    8.49  )-----------( 131      ( 03 Apr 2020 05:28 )             28.5     04-03    136  |  96  |  10  ----------------------------<  186<H>  3.3<L>   |  26  |  0.73    Ca    8.6      03 Apr 2020 05:28  Phos  2.4     04-03  Mg     1.7     04-03 SICU DAILY PROGRESS NOTE      INTERVAL HPI / OVERNIGHT EVENTS:   Hypertensive overnight, received 30 mg labetalol and 5 mg hydralazine with minimal response.   Otherwise no acute events. Rested overnight.    MEDICATIONS:  ---NEURO-  HYDROmorphone  Injectable 0.5 milliGRAM(s) IV Push every 3 hours PRN  HYDROmorphone  Injectable 1 milliGRAM(s) IV Push every 3 hours PRN  ---CV-  amLODIPine   Tablet 10 milliGRAM(s) Oral daily  labetalol Injectable 10 milliGRAM(s) IV Push every 6 hours PRN  lisinopril 20 milliGRAM(s) Oral daily  ---PULM-  ---GI/FEN-  dextrose 5% + sodium chloride 0.45%. 1000 milliLiter(s) IV Continuous <Continuous>  dextrose 5%. 1000 milliLiter(s) IV Continuous <Continuous>  ----  ---ID-   piperacillin/tazobactam IVPB.. 3.375 Gram(s) IV Intermittent every 6 hours  ---ENDO-  atorvastatin 10 milliGRAM(s) Oral at bedtime  dextrose 40% Gel 15 Gram(s) Oral once PRN  dextrose 50% Injectable 12.5 Gram(s) IV Push once  dextrose 50% Injectable 25 Gram(s) IV Push once  dextrose 50% Injectable 25 Gram(s) IV Push once  glucagon  Injectable 1 milliGRAM(s) IntraMuscular once PRN  insulin lispro (HumaLOG) corrective regimen sliding scale   SubCutaneous Before meals and at bedtime  levothyroxine Injectable 40 MICROGram(s) IV Push at bedtime  ---HEME-  aspirin enteric coated 81 milliGRAM(s) Oral daily  heparin  Injectable 5000 Unit(s) SubCutaneous every 8 hours  ---OTHER-  chlorhexidine 0.12% Liquid 15 milliLiter(s) Oral Mucosa every 12 hours          ANTIBIOTICS: piperacillin/tazobactam IVPB.. 3.375 Gram(s) IV Intermittent every 6 hours    PRESSORS:   CENTRAL LINE:                                               Remove: NO     Indication: Venous access in critically ill patient.  ARTERIAL LINE:                                              Remove: NO     Indication: Hemodynamic monitoring in critically ill patient.   URINARY CATHETER:                                    Remove: NO     Indication: I/O monitoring in critically ill patient.     VOLUME STATUS:   I&O's Detail    03 Apr 2020 07:01  -  04 Apr 2020 07:00  --------------------------------------------------------  IN:    dextrose 5% + sodium chloride 0.45%.: 1400 mL    IV PiggyBack: 100 mL  Total IN: 1500 mL    OUT:    Ileostomy: 50 mL    Nasoenteral Tube: 1620 mL    Voided: 2400 mL  Total OUT: 4070 mL    Total NET: -2570 mL        ICU Vital Signs Last 24 Hrs  T(C): 37 (03 Apr 2020 09:00), Max: 37 (03 Apr 2020 09:00)  T(F): 98.6 (03 Apr 2020 09:00), Max: 98.6 (03 Apr 2020 09:00)  HR: 85 (04 Apr 2020 04:00) (62 - 88)  BP: 166/72 (04 Apr 2020 04:00) (165/80 - 178/78)  BP(mean): 104 (04 Apr 2020 04:00) (104 - 119)  ABP: --  ABP(mean): --  RR: 20 (04 Apr 2020 04:00) (16 - 30)  SpO2: 95% (04 Apr 2020 04:00) (95% - 99%)      CAPILLARY BLOOD GLUCOSE      POCT Blood Glucose.: 234 mg/dL (04 Apr 2020 06:59)  POCT Blood Glucose.: 233 mg/dL (03 Apr 2020 23:36)  POCT Blood Glucose.: 198 mg/dL (03 Apr 2020 16:24)  POCT Blood Glucose.: 160 mg/dL (03 Apr 2020 11:27)    PHYSICAL EXAM:  Gen: NAD  Neuro: A&Ox3  Resp: No incr WOB  HEENT: NG tube in place  CV: NSR, RRR   Abd: soft. ostomy pink. dark green ostomy output present. incision clean, with no signs of purulent drainage or bleeding. slight serosanguinous drainage. moist saline gauze dressing placed  : Voids  Extr: WWP, no edema      LABS:                        9.9    8.49  )-----------( 131      ( 03 Apr 2020 05:28 )             28.5     04-03    136  |  96  |  10  ----------------------------<  186<H>  3.3<L>   |  26  |  0.73    Ca    8.6      03 Apr 2020 05:28  Phos  2.4     04-03  Mg     1.7     04-03

## 2020-04-04 NOTE — PROGRESS NOTE ADULT - SUBJECTIVE AND OBJECTIVE BOX
Seen and examined at bedside. No overnight events. Pain well controlled. Out of bed during examination in bedside chair.  Stoma with function/output this morning. NGT with bilious output. Good urinary output. Denies any complaints. Denies f/c/s/cp/sob/palp/lh.     Vital Signs Last 24 Hrs  T(C): --  T(F): --  HR: 90 (04 Apr 2020 13:57) (66 - 90)  BP: 187/85 (04 Apr 2020 13:57) (152/72 - 196/88)  BP(mean): 122 (04 Apr 2020 13:57) (102 - 141)  RR: 25 (04 Apr 2020 13:57) (16 - 31)  SpO2: 96% (04 Apr 2020 13:57) (94% - 98%)  CAPILLARY BLOOD GLUCOSE    POCT Blood Glucose.: 243 mg/dL (04 Apr 2020 11:09)  POCT Blood Glucose.: 234 mg/dL (04 Apr 2020 06:59)  POCT Blood Glucose.: 233 mg/dL (03 Apr 2020 23:36)  POCT Blood Glucose.: 198 mg/dL (03 Apr 2020 16:24)    04-03 @ 07:01  -  04-04 @ 07:00  --------------------------------------------------------  IN: 1800 mL / OUT: 5170 mL / NET: -3370 mL    04-04 @ 07:01 - 04-04 @ 14:39  --------------------------------------------------------  IN: 300 mL / OUT: 975 mL / NET: -675 mL    amLODIPine   Tablet 10 milliGRAM(s) Oral daily  aspirin enteric coated 81 milliGRAM(s) Oral daily  atorvastatin 10 milliGRAM(s) Oral at bedtime  chlorhexidine 0.12% Liquid 15 milliLiter(s) Oral Mucosa every 12 hours  dextrose 40% Gel 15 Gram(s) Oral once PRN  dextrose 5% + sodium chloride 0.45% 1000 milliLiter(s) IV Continuous <Continuous>  dextrose 5%. 1000 milliLiter(s) IV Continuous <Continuous>  dextrose 50% Injectable 12.5 Gram(s) IV Push once  dextrose 50% Injectable 25 Gram(s) IV Push once  dextrose 50% Injectable 25 Gram(s) IV Push once  glucagon  Injectable 1 milliGRAM(s) IntraMuscular once PRN  heparin  Injectable 5000 Unit(s) SubCutaneous every 8 hours  HYDROmorphone  Injectable 0.5 milliGRAM(s) IV Push every 3 hours PRN  HYDROmorphone  Injectable 1 milliGRAM(s) IV Push every 3 hours PRN  insulin lispro (HumaLOG) corrective regimen sliding scale   SubCutaneous Before meals and at bedtime  labetalol Injectable 10 milliGRAM(s) IV Push every 6 hours PRN  levothyroxine Injectable 40 MICROGram(s) IV Push at bedtime  lisinopril 20 milliGRAM(s) Oral daily  piperacillin/tazobactam IVPB.. 3.375 Gram(s) IV Intermittent every 6 hours  potassium chloride  20 mEq/100 mL IVPB 20 milliEquivalent(s) IV Intermittent every 2 hours    LABS:  CBC Full  -  ( 04 Apr 2020 09:12 )  WBC Count : 3.82 K/uL  RBC Count : 2.90 M/uL  Hemoglobin : 8.6 g/dL  Hematocrit : 24.8 %  Platelet Count - Automated : 133 K/uL  Mean Cell Volume : 85.5 fl  Mean Cell Hemoglobin : 29.7 pg  Mean Cell Hemoglobin Concentration : 34.7 gm/dL  Auto Neutrophil # : x  Auto Lymphocyte # : x  Auto Monocyte # : x  Auto Eosinophil # : x  Auto Basophil # : x  Auto Neutrophil % : x  Auto Lymphocyte % : x  Auto Monocyte % : x  Auto Eosinophil % : x  Auto Basophil % : x    04-04    140  |  95<L>  |  13  ----------------------------<  315<H>  3.1<L>   |  30  |  0.79    Ca    8.3<L>      04 Apr 2020 09:12  Phos  2.3     04-04  Mg     1.8     04-04

## 2020-04-04 NOTE — PROGRESS NOTE ADULT - ASSESSMENT
61 y/o M with PMH of HTN, DM complicated by neuropathy, EtOH liver cirrhosis, presenting w/ pneumatosis s/p sub-total colectomy and end ileostomy on 3/31. Remains in SICU for hemodynamic monitoring.    Neuro: CT head negative for bleed or fracture. Awake and alert.   CV: HDS but hypertensive, Vasotec IV q6 and Labetalol, hydralazine PRN, home medications lisinopril and amlodipine restarted  Pulm: Extubated 4/1, doing well on NC  FENGI: NPO/NG (1620 cc output in 24 hours),  Ileostomy pink, OP 50 cc in 24 hrs, awaiting return of bowel function  :soliz removed, passed trial of void, voiding well  ID: [email or web address] (3/30 - ). planned until at least POD7  Endo: ISS , lantus 14  FEN: D5 1/2NS @ 100  PPx: SCDs, SQH  PT/OT: PT evaluation 4/3, continue PT work for balance training, bed mobility, gait training, transfer training and strengthening 59 y/o M with PMH of HTN, DM complicated by neuropathy, EtOH liver cirrhosis, presenting w/ pneumatosis s/p sub-total colectomy and end ileostomy on 3/31. Remains in SICU for hemodynamic monitoring.    Neuro: CT head negative for bleed or fracture. Awake and alert.   CV: HDS but hypertensive, Vasotec IV q6 and Labetalol, hydralazine PRN, home medications lisinopril and amlodipine restarted  Pulm: Extubated 4/1, doing well on NC  FENGI: NPO/NG (1620 cc output in 24 hours),  Ileostomy pink, OP 50 cc in 24 hrs, awaiting return of bowel function. will obtain CXR to confirm location of NG tube tip  :soliz removed, passed trial of void, voiding well  ID: zosyn (3/30 - ). planned until at least POD7  Endo: ISS , lantus 14  FEN: D5 1/2NS @ 100  PPx: SCDs, SQH  PT/OT: PT evaluation 4/3, continue PT work for balance training, bed mobility, gait training, transfer training and strengthening

## 2020-04-04 NOTE — PROGRESS NOTE ADULT - ATTENDING COMMENTS
NGT output is diminishing.  Afebrile.  Feeling better overall.  Ileostomy is functioning.  Replace ileostomy output.  Keep NGT for now.  OOB to ambulate. NGT output is diminishing.  Afebrile.  Feeling better overall.  Ileostomy is functioning.  Replace ileostomy output.  Keep NGT for now.  Ok for ice chips and water.  OOB to ambulate.

## 2020-04-04 NOTE — PROGRESS NOTE ADULT - GASTROINTESTINAL COMMENTS
distended abdomen, appropriately tender, midline wound open with healthy appearing tissue
stoma ppe, abdomen soft, bilious output with gas in appliance. wound granulating well

## 2020-04-04 NOTE — PROGRESS NOTE ADULT - ASSESSMENT
61 y/o M with PMH of HTN, DM complicated by neuropathy, EtOH liver cirrhosis, POD 4 subtotal colectomy for toxic megacolon. Non toxic appearing, pain well controlled, on nasal cannula for supplemental O2, stoma with output/functioning, NGT with >1L output.     -IS/OOB ambulation  -Continue NGT, likely dc tomorrow if output decreases  -SDU  -Cont IVF  -Home meds

## 2020-04-05 LAB
ANION GAP SERPL CALC-SCNC: 13 MMOL/L — SIGNIFICANT CHANGE UP (ref 5–17)
ANION GAP SERPL CALC-SCNC: 14 MMOL/L — SIGNIFICANT CHANGE UP (ref 5–17)
BUN SERPL-MCNC: 12 MG/DL — SIGNIFICANT CHANGE UP (ref 7–23)
BUN SERPL-MCNC: 12 MG/DL — SIGNIFICANT CHANGE UP (ref 7–23)
CALCIUM SERPL-MCNC: 8.4 MG/DL — SIGNIFICANT CHANGE UP (ref 8.4–10.5)
CALCIUM SERPL-MCNC: 8.8 MG/DL — SIGNIFICANT CHANGE UP (ref 8.4–10.5)
CHLORIDE SERPL-SCNC: 102 MMOL/L — SIGNIFICANT CHANGE UP (ref 96–108)
CHLORIDE SERPL-SCNC: 98 MMOL/L — SIGNIFICANT CHANGE UP (ref 96–108)
CO2 SERPL-SCNC: 29 MMOL/L — SIGNIFICANT CHANGE UP (ref 22–31)
CO2 SERPL-SCNC: 32 MMOL/L — HIGH (ref 22–31)
CREAT SERPL-MCNC: 0.72 MG/DL — SIGNIFICANT CHANGE UP (ref 0.5–1.3)
CREAT SERPL-MCNC: 0.78 MG/DL — SIGNIFICANT CHANGE UP (ref 0.5–1.3)
CULTURE RESULTS: SIGNIFICANT CHANGE UP
GLUCOSE BLDC GLUCOMTR-MCNC: 127 MG/DL — HIGH (ref 70–99)
GLUCOSE BLDC GLUCOMTR-MCNC: 176 MG/DL — HIGH (ref 70–99)
GLUCOSE BLDC GLUCOMTR-MCNC: 191 MG/DL — HIGH (ref 70–99)
GLUCOSE BLDC GLUCOMTR-MCNC: 253 MG/DL — HIGH (ref 70–99)
GLUCOSE SERPL-MCNC: 185 MG/DL — HIGH (ref 70–99)
GLUCOSE SERPL-MCNC: 266 MG/DL — HIGH (ref 70–99)
HBA1C BLD-MCNC: 6.9 % — HIGH (ref 4–5.6)
HCT VFR BLD CALC: 26.2 % — LOW (ref 39–50)
HGB BLD-MCNC: 8.5 G/DL — LOW (ref 13–17)
MAGNESIUM SERPL-MCNC: 1.7 MG/DL — SIGNIFICANT CHANGE UP (ref 1.6–2.6)
MAGNESIUM SERPL-MCNC: 1.8 MG/DL — SIGNIFICANT CHANGE UP (ref 1.6–2.6)
MCHC RBC-ENTMCNC: 29 PG — SIGNIFICANT CHANGE UP (ref 27–34)
MCHC RBC-ENTMCNC: 32.4 GM/DL — SIGNIFICANT CHANGE UP (ref 32–36)
MCV RBC AUTO: 89.4 FL — SIGNIFICANT CHANGE UP (ref 80–100)
NRBC # BLD: 0 /100 WBCS — SIGNIFICANT CHANGE UP (ref 0–0)
PHOSPHATE SERPL-MCNC: 3.1 MG/DL — SIGNIFICANT CHANGE UP (ref 2.5–4.5)
PHOSPHATE SERPL-MCNC: 3.2 MG/DL — SIGNIFICANT CHANGE UP (ref 2.5–4.5)
PLATELET # BLD AUTO: 123 K/UL — LOW (ref 150–400)
POTASSIUM SERPL-MCNC: 2.6 MMOL/L — CRITICAL LOW (ref 3.5–5.3)
POTASSIUM SERPL-MCNC: 3.5 MMOL/L — SIGNIFICANT CHANGE UP (ref 3.5–5.3)
POTASSIUM SERPL-SCNC: 2.6 MMOL/L — CRITICAL LOW (ref 3.5–5.3)
POTASSIUM SERPL-SCNC: 3.5 MMOL/L — SIGNIFICANT CHANGE UP (ref 3.5–5.3)
RBC # BLD: 2.93 M/UL — LOW (ref 4.2–5.8)
RBC # FLD: 13.5 % — SIGNIFICANT CHANGE UP (ref 10.3–14.5)
SODIUM SERPL-SCNC: 143 MMOL/L — SIGNIFICANT CHANGE UP (ref 135–145)
SODIUM SERPL-SCNC: 145 MMOL/L — SIGNIFICANT CHANGE UP (ref 135–145)
SPECIMEN SOURCE: SIGNIFICANT CHANGE UP
WBC # BLD: 4.26 K/UL — SIGNIFICANT CHANGE UP (ref 3.8–10.5)
WBC # FLD AUTO: 4.26 K/UL — SIGNIFICANT CHANGE UP (ref 3.8–10.5)

## 2020-04-05 PROCEDURE — 74018 RADEX ABDOMEN 1 VIEW: CPT | Mod: 26

## 2020-04-05 PROCEDURE — 99233 SBSQ HOSP IP/OBS HIGH 50: CPT | Mod: GC

## 2020-04-05 RX ORDER — POTASSIUM PHOSPHATE, MONOBASIC POTASSIUM PHOSPHATE, DIBASIC 236; 224 MG/ML; MG/ML
30 INJECTION, SOLUTION INTRAVENOUS ONCE
Refills: 0 | Status: COMPLETED | OUTPATIENT
Start: 2020-04-05 | End: 2020-04-05

## 2020-04-05 RX ORDER — HYDRALAZINE HCL 50 MG
10 TABLET ORAL EVERY 4 HOURS
Refills: 0 | Status: DISCONTINUED | OUTPATIENT
Start: 2020-04-05 | End: 2020-04-15

## 2020-04-05 RX ORDER — POTASSIUM CHLORIDE 20 MEQ
20 PACKET (EA) ORAL
Refills: 0 | Status: COMPLETED | OUTPATIENT
Start: 2020-04-05 | End: 2020-04-05

## 2020-04-05 RX ORDER — LABETALOL HCL 100 MG
20 TABLET ORAL EVERY 6 HOURS
Refills: 0 | Status: DISCONTINUED | OUTPATIENT
Start: 2020-04-05 | End: 2020-04-15

## 2020-04-05 RX ORDER — ACETAMINOPHEN 500 MG
650 TABLET ORAL EVERY 6 HOURS
Refills: 0 | Status: DISCONTINUED | OUTPATIENT
Start: 2020-04-05 | End: 2020-04-06

## 2020-04-05 RX ORDER — POTASSIUM CHLORIDE 20 MEQ
80 PACKET (EA) ORAL ONCE
Refills: 0 | Status: COMPLETED | OUTPATIENT
Start: 2020-04-05 | End: 2020-04-05

## 2020-04-05 RX ORDER — MAGNESIUM SULFATE 500 MG/ML
4 VIAL (ML) INJECTION ONCE
Refills: 0 | Status: COMPLETED | OUTPATIENT
Start: 2020-04-05 | End: 2020-04-05

## 2020-04-05 RX ORDER — SODIUM CHLORIDE 9 MG/ML
1000 INJECTION INTRAMUSCULAR; INTRAVENOUS; SUBCUTANEOUS
Refills: 0 | Status: DISCONTINUED | OUTPATIENT
Start: 2020-04-05 | End: 2020-04-13

## 2020-04-05 RX ADMIN — POTASSIUM PHOSPHATE, MONOBASIC POTASSIUM PHOSPHATE, DIBASIC 83.33 MILLIMOLE(S): 236; 224 INJECTION, SOLUTION INTRAVENOUS at 10:14

## 2020-04-05 RX ADMIN — Medication 100 MILLIEQUIVALENT(S): at 00:23

## 2020-04-05 RX ADMIN — HYDROMORPHONE HYDROCHLORIDE 0.5 MILLIGRAM(S): 2 INJECTION INTRAMUSCULAR; INTRAVENOUS; SUBCUTANEOUS at 21:55

## 2020-04-05 RX ADMIN — Medication 50 MILLIEQUIVALENT(S): at 09:57

## 2020-04-05 RX ADMIN — PIPERACILLIN AND TAZOBACTAM 200 GRAM(S): 4; .5 INJECTION, POWDER, LYOPHILIZED, FOR SOLUTION INTRAVENOUS at 23:27

## 2020-04-05 RX ADMIN — Medication 10 MILLIGRAM(S): at 09:43

## 2020-04-05 RX ADMIN — ATORVASTATIN CALCIUM 10 MILLIGRAM(S): 80 TABLET, FILM COATED ORAL at 21:48

## 2020-04-05 RX ADMIN — Medication 50 MILLIEQUIVALENT(S): at 10:37

## 2020-04-05 RX ADMIN — HYDROMORPHONE HYDROCHLORIDE 0.5 MILLIGRAM(S): 2 INJECTION INTRAMUSCULAR; INTRAVENOUS; SUBCUTANEOUS at 06:03

## 2020-04-05 RX ADMIN — Medication 40 MICROGRAM(S): at 22:20

## 2020-04-05 RX ADMIN — LISINOPRIL 40 MILLIGRAM(S): 2.5 TABLET ORAL at 06:05

## 2020-04-05 RX ADMIN — HYDROMORPHONE HYDROCHLORIDE 0.5 MILLIGRAM(S): 2 INJECTION INTRAMUSCULAR; INTRAVENOUS; SUBCUTANEOUS at 18:50

## 2020-04-05 RX ADMIN — Medication 100 GRAM(S): at 16:27

## 2020-04-05 RX ADMIN — Medication 50 MILLIEQUIVALENT(S): at 09:44

## 2020-04-05 RX ADMIN — PIPERACILLIN AND TAZOBACTAM 200 GRAM(S): 4; .5 INJECTION, POWDER, LYOPHILIZED, FOR SOLUTION INTRAVENOUS at 11:37

## 2020-04-05 RX ADMIN — Medication 2: at 12:41

## 2020-04-05 RX ADMIN — Medication 650 MILLIGRAM(S): at 16:12

## 2020-04-05 RX ADMIN — Medication 10 MILLIGRAM(S): at 03:30

## 2020-04-05 RX ADMIN — PIPERACILLIN AND TAZOBACTAM 200 GRAM(S): 4; .5 INJECTION, POWDER, LYOPHILIZED, FOR SOLUTION INTRAVENOUS at 18:18

## 2020-04-05 RX ADMIN — Medication 10 MILLIGRAM(S): at 13:27

## 2020-04-05 RX ADMIN — Medication 10 MILLIGRAM(S): at 08:30

## 2020-04-05 RX ADMIN — AMLODIPINE BESYLATE 10 MILLIGRAM(S): 2.5 TABLET ORAL at 06:06

## 2020-04-05 RX ADMIN — Medication 80 MILLIEQUIVALENT(S): at 12:34

## 2020-04-05 RX ADMIN — HEPARIN SODIUM 5000 UNIT(S): 5000 INJECTION INTRAVENOUS; SUBCUTANEOUS at 21:48

## 2020-04-05 RX ADMIN — HEPARIN SODIUM 5000 UNIT(S): 5000 INJECTION INTRAVENOUS; SUBCUTANEOUS at 06:05

## 2020-04-05 RX ADMIN — CHLORHEXIDINE GLUCONATE 15 MILLILITER(S): 213 SOLUTION TOPICAL at 20:31

## 2020-04-05 RX ADMIN — PIPERACILLIN AND TAZOBACTAM 200 GRAM(S): 4; .5 INJECTION, POWDER, LYOPHILIZED, FOR SOLUTION INTRAVENOUS at 04:17

## 2020-04-05 RX ADMIN — Medication 81 MILLIGRAM(S): at 18:58

## 2020-04-05 RX ADMIN — Medication 2: at 18:46

## 2020-04-05 RX ADMIN — HEPARIN SODIUM 5000 UNIT(S): 5000 INJECTION INTRAVENOUS; SUBCUTANEOUS at 13:30

## 2020-04-05 RX ADMIN — Medication 6: at 07:34

## 2020-04-05 NOTE — PROGRESS NOTE ADULT - ASSESSMENT
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 Now s/p sub total colectomy and end ileostomy on 3/31. High NGT output, clamp trial. Clinically stable    Neuro: CT head negative for bleed or fracture. Awake and alert.   CV: HDS but hypertensive, lisinopril 40 mg and amlodipine 10mg, Labetalol PRN BP >170  Pulm: Extubated, doing well on RA  FENGI: NPO/NG; Ileostomy pink, ostomy functioning on 4/4  : d/c soliz 4/2, voiding well  ID: [email or web address] (3/30 - 4/6 )  Endo: ISS , lantus 14, synthroid  FEN: D5 1/2NS @ 100  PPx: SCDs, SQH  PT/OT: initial evaluation 4/3

## 2020-04-05 NOTE — PROGRESS NOTE ADULT - SUBJECTIVE AND OBJECTIVE BOX
STATUS POST:      INTERVAL HPI/OVERNIGHT EVENTS:      SUBJECTIVE:     amLODIPine   Tablet 10 milliGRAM(s) Oral daily  aspirin enteric coated 81 milliGRAM(s) Oral daily  heparin  Injectable 5000 Unit(s) SubCutaneous every 8 hours  labetalol Injectable 10 milliGRAM(s) IV Push every 6 hours PRN  lisinopril 40 milliGRAM(s) Oral daily  piperacillin/tazobactam IVPB.. 3.375 Gram(s) IV Intermittent every 6 hours      Vital Signs Last 24 Hrs  T(C): 36.6 (05 Apr 2020 06:18), Max: 37.1 (04 Apr 2020 16:27)  T(F): 97.8 (05 Apr 2020 06:18), Max: 98.7 (04 Apr 2020 16:27)  HR: 65 (05 Apr 2020 07:15) (61 - 90)  BP: 184/81 (05 Apr 2020 07:15) (145/78 - 198/86)  BP(mean): 116 (05 Apr 2020 07:15) (97 - 153)  RR: 19 (05 Apr 2020 07:15) (16 - 34)  SpO2: 95% (05 Apr 2020 07:15) (91% - 99%)  I&O's Detail    04 Apr 2020 07:01  -  05 Apr 2020 07:00  --------------------------------------------------------  IN:    dextrose 5% + sodium chloride 0.45%: 1200 mL    dextrose 5% + sodium chloride 0.45%.: 300 mL    Oral Fluid: 100 mL    Solution: 200 mL    Solution: 200 mL  Total IN: 2000 mL    OUT:    Ileostomy: 600 mL    Nasoenteral Tube: 3200 mL    Voided: 675 mL  Total OUT: 4475 mL    Total NET: -2475 mL      05 Apr 2020 07:01  -  05 Apr 2020 07:44  --------------------------------------------------------  IN:    dextrose 5% + sodium chloride 0.45%: 100 mL  Total IN: 100 mL    OUT:  Total OUT: 0 mL    Total NET: 100 mL          Physical Exam:        LABS:                        8.5    4.26  )-----------( 123      ( 05 Apr 2020 06:59 )             26.2     04-04    142  |  98  |  12  ----------------------------<  233<H>  2.8<LL>   |  29  |  0.74    Ca    8.6      04 Apr 2020 22:43  Phos  2.3     04-04  Mg     1.7     04-04            RADIOLOGY & ADDITIONAL STUDIES: STATUS POST: POD # 5 s/p ex lap, subtotal colectomy      INTERVAL HPI/OVERNIGHT EVENTS: BP 189m labetalol 1 given, Lisinopril increased     SUBJECTIVE: Examined with chief resident at the bedside, resting comfortably. This morning, he feels well; his pain is well-controlled. Has nausea but no vomiting. No acute complaints.     amLODIPine   Tablet 10 milliGRAM(s) Oral daily  aspirin enteric coated 81 milliGRAM(s) Oral daily  heparin  Injectable 5000 Unit(s) SubCutaneous every 8 hours  labetalol Injectable 10 milliGRAM(s) IV Push every 6 hours PRN  lisinopril 40 milliGRAM(s) Oral daily  piperacillin/tazobactam IVPB.. 3.375 Gram(s) IV Intermittent every 6 hours      Vital Signs Last 24 Hrs  T(C): 36.6 (05 Apr 2020 06:18), Max: 37.1 (04 Apr 2020 16:27)  T(F): 97.8 (05 Apr 2020 06:18), Max: 98.7 (04 Apr 2020 16:27)  HR: 65 (05 Apr 2020 07:15) (61 - 90)  BP: 184/81 (05 Apr 2020 07:15) (145/78 - 198/86)  BP(mean): 116 (05 Apr 2020 07:15) (97 - 153)  RR: 19 (05 Apr 2020 07:15) (16 - 34)  SpO2: 95% (05 Apr 2020 07:15) (91% - 99%)  I&O's Detail    04 Apr 2020 07:01  -  05 Apr 2020 07:00  --------------------------------------------------------  IN:    dextrose 5% + sodium chloride 0.45%: 1200 mL    dextrose 5% + sodium chloride 0.45%.: 300 mL    Oral Fluid: 100 mL    Solution: 200 mL    Solution: 200 mL  Total IN: 2000 mL    OUT:    Ileostomy: 600 mL    Nasoenteral Tube: 3200 mL    Voided: 675 mL  Total OUT: 4475 mL    Total NET: -2475 mL      05 Apr 2020 07:01  -  05 Apr 2020 07:44  --------------------------------------------------------  IN:    dextrose 5% + sodium chloride 0.45%: 100 mL  Total IN: 100 mL    OUT:  Total OUT: 0 mL    Total NET: 100 mL          Physical Exam:  General: NAD, resting comfortably in bed  HEENT: NGT in place with (1500cc output)  Pulmonary: Nonlabored breathing, no respiratory distress  Cardiovascular: NSR  Abdominal: soft, non-tender, non-distended, ostomy with liquid stool  Extremities: WWP, normal strength  Pulses: palpable distal pulses         LABS:                        8.5    4.26  )-----------( 123      ( 05 Apr 2020 06:59 )             26.2     04-04    142  |  98  |  12  ----------------------------<  233<H>  2.8<LL>   |  29  |  0.74    Ca    8.6      04 Apr 2020 22:43  Phos  2.3     04-04  Mg     1.7     04-04            RADIOLOGY & ADDITIONAL STUDIES:

## 2020-04-05 NOTE — PROGRESS NOTE ADULT - SUBJECTIVE AND OBJECTIVE BOX
SICU DAILY PROGRESS NOTE      INTERVAL HPI / OVERNIGHT EVENTS:   Hypertensive overnight, Pain well controlled, received labetalol x 2 IV for systolic BP > 170. Now has output in ileostomy. NGT with 1500 clear brown output    ICU Vital Signs Last 24 Hrs  T(C): 36.6 (2020 06:18), Max: 37.1 (2020 16:27)  T(F): 97.8 (2020 06:18), Max: 98.7 (2020 16:27)  HR: 65 (2020 07:15) (61 - 90)  BP: 184/81 (2020 07:15) (145/78 - 198/86)  BP(mean): 116 (2020 07:15) (97 - 153)  ABP: --  ABP(mean): --  RR: 19 (2020 07:15) (16 - 34)  SpO2: 95% (2020 07:15) (91% - 99%)        PHYSICAL EXAM:  Gen: NAD  Resp: Saturating well on RA  HEENT: NG tube in place to LIS  CV: NSR, RRR   Abd: soft. ostomy pink. dark green ostomy output present. Incision packed with saline gauze  : Voids    LABS/RADIOLOGY RESULTS:                          8.5    4.26  )-----------( 123      ( 2020 06:59 )             26.2   04-05    143  |  98  |  12  ----------------------------<  266<H>  2.6<LL>   |  32<H>  |  0.78    Ca    8.4      2020 06:59  Phos  3.2     04-05  Mg     1.8     04-05    Blood Cultures    Urinalysis Basic - ( 31 Mar 2020 02:20 )    Color: Yellow / Appearance: Clear / S.025 / pH:   Gluc:  / Ketone: Trace mg/dL  / Bili: Negative / Urobili: 0.2 E.U./dL   Blood:  / Protein: 30 mg/dL / Nitrite: NEGATIVE   Leuk Esterase: NEGATIVE / RBC: 5-10 /HPF / WBC < 5 /HPF   Sq Epi:  / Non Sq Epi: 5-10 /HPF / Bacteria: Present /HPF

## 2020-04-05 NOTE — PROGRESS NOTE ADULT - ASSESSMENT
61 y/o M with PMH of HTN, DM complicated by neuropathy, EtOH liver cirrhosis, presenting w/ pneumatosis s/p sub-total colectomy and end ileostomy on 3/31. Remains in SICU for hemodynamic monitoring.    Neuro: Awake and alert  CV: HDS but hypertensive, lisinopril 40 qd, amlodipine 10 mg qd, labetalol 10 mg iv q6h prn for systolic BP > 170  Pulm: Extubated 4/1, saturating well on RA  FENGI: NPO/NGT with 2500 cc over past 24 hours will obtain CXR to confirm location of NG tube tip, if position adequate will try clamp trial.  : soliz removed, passed trial of void, voiding well  ID: zosyn (3/30 - ). planned until at least POD7 i.e 4/6  Endo: ISS , lantus 14  FEN: D5 1/2NS @ 100  PPx: SCDs, SQH  PT/OT: PT evaluation 4/3, continue PT work for balance training, bed mobility, gait training, transfer training and strengthening 59 y/o M with PMH of HTN, DM complicated by neuropathy, EtOH liver cirrhosis, presenting w/ pneumatosis s/p sub-total colectomy and end ileostomy on 3/31. Remains in SICU for hemodynamic monitoring.    Neuro: Awake and alert  CV: HDS but hypertensive, lisinopril 40 qd, amlodipine 10 mg qd, labetalol 10 mg iv q6h prn for systolic BP > 170  Pulm: Extubated 4/1, saturating well on RA  FENGI: NPO/NGT with 2500 cc over past 24 hours will obtain CXR to confirm location of NG tube tip, if position adequate will try clamp trial.  : soliz removed, passed trial of void, voiding well  ID: zosyn (3/30 - ). planned until at least POD7 i.e 4/6  Endo: ISS , lantus 14  FEN: D5 1/2NS @ 100  PPx: SCDs, SQH  PT/OT: PT evaluation 4/3, continue PT work for balance training, bed mobility, gait training, transfer training and strengthening        SICU Chief addendum: 60 M s/p ex lap, sub-total colectomy and end ileostomy 3/31. Remains NPO with ~3.2 L NGT output and 600 cc ileostomy output over past 24 hours. Persistently hypokalemic despite repletion overnight. Also hypertensive as high as 200s systolic, requiring labetalol IV PRN. Will add hydralazine and continue PO regimen via NGT. Also plan to re-check NGT position on XR. If appropriately positioned, will trial placing NGT to gravity given return of bowel function. Agree with remainder of plan, as above.

## 2020-04-06 LAB
ANION GAP SERPL CALC-SCNC: 15 MMOL/L — SIGNIFICANT CHANGE UP (ref 5–17)
BASOPHILS # BLD AUTO: 0 K/UL — SIGNIFICANT CHANGE UP (ref 0–0.2)
BASOPHILS NFR BLD AUTO: 0 % — SIGNIFICANT CHANGE UP (ref 0–2)
BUN SERPL-MCNC: 12 MG/DL — SIGNIFICANT CHANGE UP (ref 7–23)
CALCIUM SERPL-MCNC: 8.1 MG/DL — LOW (ref 8.4–10.5)
CHLORIDE SERPL-SCNC: 99 MMOL/L — SIGNIFICANT CHANGE UP (ref 96–108)
CO2 SERPL-SCNC: 26 MMOL/L — SIGNIFICANT CHANGE UP (ref 22–31)
CREAT SERPL-MCNC: 0.74 MG/DL — SIGNIFICANT CHANGE UP (ref 0.5–1.3)
EOSINOPHIL # BLD AUTO: 0 K/UL — SIGNIFICANT CHANGE UP (ref 0–0.5)
EOSINOPHIL NFR BLD AUTO: 0 % — SIGNIFICANT CHANGE UP (ref 0–6)
GLUCOSE BLDC GLUCOMTR-MCNC: 147 MG/DL — HIGH (ref 70–99)
GLUCOSE BLDC GLUCOMTR-MCNC: 162 MG/DL — HIGH (ref 70–99)
GLUCOSE BLDC GLUCOMTR-MCNC: 207 MG/DL — HIGH (ref 70–99)
GLUCOSE BLDC GLUCOMTR-MCNC: 210 MG/DL — HIGH (ref 70–99)
GLUCOSE SERPL-MCNC: 150 MG/DL — HIGH (ref 70–99)
HCT VFR BLD CALC: 29.3 % — LOW (ref 39–50)
HGB BLD-MCNC: 9.4 G/DL — LOW (ref 13–17)
LYMPHOCYTES # BLD AUTO: 0.51 K/UL — LOW (ref 1–3.3)
LYMPHOCYTES # BLD AUTO: 12.2 % — LOW (ref 13–44)
MAGNESIUM SERPL-MCNC: 2.1 MG/DL — SIGNIFICANT CHANGE UP (ref 1.6–2.6)
MCHC RBC-ENTMCNC: 28.9 PG — SIGNIFICANT CHANGE UP (ref 27–34)
MCHC RBC-ENTMCNC: 32.1 GM/DL — SIGNIFICANT CHANGE UP (ref 32–36)
MCV RBC AUTO: 90.2 FL — SIGNIFICANT CHANGE UP (ref 80–100)
MONOCYTES # BLD AUTO: 0.36 K/UL — SIGNIFICANT CHANGE UP (ref 0–0.9)
MONOCYTES NFR BLD AUTO: 8.7 % — SIGNIFICANT CHANGE UP (ref 2–14)
NEUTROPHILS # BLD AUTO: 3.29 K/UL — SIGNIFICANT CHANGE UP (ref 1.8–7.4)
NEUTROPHILS NFR BLD AUTO: 77.4 % — HIGH (ref 43–77)
PHOSPHATE SERPL-MCNC: 3.7 MG/DL — SIGNIFICANT CHANGE UP (ref 2.5–4.5)
PLATELET # BLD AUTO: 140 K/UL — LOW (ref 150–400)
POTASSIUM SERPL-MCNC: 3.1 MMOL/L — LOW (ref 3.5–5.3)
POTASSIUM SERPL-SCNC: 3.1 MMOL/L — LOW (ref 3.5–5.3)
RBC # BLD: 3.25 M/UL — LOW (ref 4.2–5.8)
RBC # FLD: 13.6 % — SIGNIFICANT CHANGE UP (ref 10.3–14.5)
SODIUM SERPL-SCNC: 140 MMOL/L — SIGNIFICANT CHANGE UP (ref 135–145)
WBC # BLD: 4.16 K/UL — SIGNIFICANT CHANGE UP (ref 3.8–10.5)
WBC # FLD AUTO: 4.16 K/UL — SIGNIFICANT CHANGE UP (ref 3.8–10.5)

## 2020-04-06 PROCEDURE — 99232 SBSQ HOSP IP/OBS MODERATE 35: CPT | Mod: GC

## 2020-04-06 RX ORDER — ACETAMINOPHEN 500 MG
650 TABLET ORAL EVERY 6 HOURS
Refills: 0 | Status: DISCONTINUED | OUTPATIENT
Start: 2020-04-06 | End: 2020-04-15

## 2020-04-06 RX ORDER — POTASSIUM CHLORIDE 20 MEQ
40 PACKET (EA) ORAL
Refills: 0 | Status: COMPLETED | OUTPATIENT
Start: 2020-04-06 | End: 2020-04-06

## 2020-04-06 RX ADMIN — Medication 81 MILLIGRAM(S): at 15:48

## 2020-04-06 RX ADMIN — Medication 40 MILLIEQUIVALENT(S): at 10:58

## 2020-04-06 RX ADMIN — Medication 650 MILLIGRAM(S): at 17:29

## 2020-04-06 RX ADMIN — HEPARIN SODIUM 5000 UNIT(S): 5000 INJECTION INTRAVENOUS; SUBCUTANEOUS at 05:39

## 2020-04-06 RX ADMIN — Medication 2: at 06:46

## 2020-04-06 RX ADMIN — Medication 20 MILLIGRAM(S): at 06:51

## 2020-04-06 RX ADMIN — AMLODIPINE BESYLATE 10 MILLIGRAM(S): 2.5 TABLET ORAL at 05:39

## 2020-04-06 RX ADMIN — HYDROMORPHONE HYDROCHLORIDE 0.5 MILLIGRAM(S): 2 INJECTION INTRAMUSCULAR; INTRAVENOUS; SUBCUTANEOUS at 12:42

## 2020-04-06 RX ADMIN — ATORVASTATIN CALCIUM 10 MILLIGRAM(S): 80 TABLET, FILM COATED ORAL at 23:30

## 2020-04-06 RX ADMIN — PIPERACILLIN AND TAZOBACTAM 200 GRAM(S): 4; .5 INJECTION, POWDER, LYOPHILIZED, FOR SOLUTION INTRAVENOUS at 05:38

## 2020-04-06 RX ADMIN — CHLORHEXIDINE GLUCONATE 15 MILLILITER(S): 213 SOLUTION TOPICAL at 05:39

## 2020-04-06 RX ADMIN — PIPERACILLIN AND TAZOBACTAM 200 GRAM(S): 4; .5 INJECTION, POWDER, LYOPHILIZED, FOR SOLUTION INTRAVENOUS at 13:11

## 2020-04-06 RX ADMIN — HYDROMORPHONE HYDROCHLORIDE 0.5 MILLIGRAM(S): 2 INJECTION INTRAMUSCULAR; INTRAVENOUS; SUBCUTANEOUS at 02:26

## 2020-04-06 RX ADMIN — Medication 4: at 23:00

## 2020-04-06 RX ADMIN — HYDROMORPHONE HYDROCHLORIDE 0.5 MILLIGRAM(S): 2 INJECTION INTRAMUSCULAR; INTRAVENOUS; SUBCUTANEOUS at 08:05

## 2020-04-06 RX ADMIN — Medication 4: at 18:57

## 2020-04-06 RX ADMIN — LISINOPRIL 40 MILLIGRAM(S): 2.5 TABLET ORAL at 05:38

## 2020-04-06 RX ADMIN — Medication 40 MICROGRAM(S): at 22:31

## 2020-04-06 RX ADMIN — CHLORHEXIDINE GLUCONATE 15 MILLILITER(S): 213 SOLUTION TOPICAL at 19:42

## 2020-04-06 RX ADMIN — PIPERACILLIN AND TAZOBACTAM 200 GRAM(S): 4; .5 INJECTION, POWDER, LYOPHILIZED, FOR SOLUTION INTRAVENOUS at 17:58

## 2020-04-06 RX ADMIN — HEPARIN SODIUM 5000 UNIT(S): 5000 INJECTION INTRAVENOUS; SUBCUTANEOUS at 22:31

## 2020-04-06 RX ADMIN — HYDROMORPHONE HYDROCHLORIDE 0.5 MILLIGRAM(S): 2 INJECTION INTRAMUSCULAR; INTRAVENOUS; SUBCUTANEOUS at 17:30

## 2020-04-06 RX ADMIN — HYDROMORPHONE HYDROCHLORIDE 0.5 MILLIGRAM(S): 2 INJECTION INTRAMUSCULAR; INTRAVENOUS; SUBCUTANEOUS at 20:59

## 2020-04-06 RX ADMIN — Medication 10 MILLIGRAM(S): at 04:11

## 2020-04-06 NOTE — PROGRESS NOTE ADULT - SUBJECTIVE AND OBJECTIVE BOX
SUBJECTIVE: Agitated, feeling uneasy, would not like to be in the hospital any more, c/o some pain and thirst. Patient seen and examined bedside by chief resident.    amLODIPine   Tablet 10 milliGRAM(s) Oral daily  aspirin enteric coated 81 milliGRAM(s) Oral daily  heparin  Injectable 5000 Unit(s) SubCutaneous every 8 hours  hydrALAZINE Injectable 10 milliGRAM(s) IV Push every 4 hours PRN  labetalol Injectable 20 milliGRAM(s) IV Push every 6 hours PRN  lisinopril 40 milliGRAM(s) Oral daily  piperacillin/tazobactam IVPB.. 3.375 Gram(s) IV Intermittent every 6 hours    MEDICATIONS  (PRN):  acetaminophen   Tablet .. 650 milliGRAM(s) Oral every 6 hours PRN Mild Pain (1-3)  dextrose 40% Gel 15 Gram(s) Oral once PRN Blood Glucose LESS THAN 70 milliGRAM(s)/deciliter  glucagon  Injectable 1 milliGRAM(s) IntraMuscular once PRN Glucose LESS THAN 70 milligrams/deciliter  hydrALAZINE Injectable 10 milliGRAM(s) IV Push every 4 hours PRN SBP >170  HYDROmorphone  Injectable 0.5 milliGRAM(s) IV Push every 3 hours PRN Moderate Pain (4 - 6)  labetalol Injectable 20 milliGRAM(s) IV Push every 6 hours PRN 2nd line drug, Systolic blood pressure > 170      I&O's Detail    05 Apr 2020 07:01  -  06 Apr 2020 07:00  --------------------------------------------------------  IN:    dextrose 5% + sodium chloride 0.45%: 200 mL    sodium chloride 0.9%.: 850 mL    Solution: 200 mL  Total IN: 1250 mL    OUT:    Ileostomy: 1650 mL    Nasoenteral Tube: 300 mL    Voided: 850 mL  Total OUT: 2800 mL    Total NET: -1550 mL      06 Apr 2020 07:01  -  06 Apr 2020 11:07  --------------------------------------------------------  IN:    sodium chloride 0.9%.: 50 mL  Total IN: 50 mL    OUT:    Ileostomy: 200 mL  Total OUT: 200 mL    Total NET: -150 mL          Vital Signs Last 24 Hrs  T(C): 37.1 (06 Apr 2020 06:20), Max: 37.1 (06 Apr 2020 06:20)  T(F): 98.7 (06 Apr 2020 06:20), Max: 98.7 (06 Apr 2020 06:20)  HR: 73 (06 Apr 2020 09:00) (64 - 92)  BP: 153/71 (06 Apr 2020 07:20) (90/65 - 205/84)  BP(mean): 102 (06 Apr 2020 07:20) (74 - 123)  RR: 17 (06 Apr 2020 09:00) (16 - 32)  SpO2: 96% (06 Apr 2020 09:00) (95% - 99%)    General: NAD, resting comfortably in bed  C/V: NSR  Pulm: Nonlabored breathing, no respiratory distress  Abd: soft, NT/ND, incision CDI healing well, wet to dry dressing placed, ostomy in place and functional  Extrem: SCDs in place    LABS:                        9.4    4.16  )-----------( 140      ( 06 Apr 2020 05:35 )             29.3     04-06    140  |  99  |  12  ----------------------------<  150<H>  3.1<L>   |  26  |  0.74    Ca    8.1<L>      06 Apr 2020 05:35  Phos  3.7     04-06  Mg     2.1     04-06

## 2020-04-06 NOTE — CHART NOTE - NSCHARTNOTEFT_GEN_A_CORE
Admitting Diagnosis:   Patient is a 60y old  Male who presents with a chief complaint of toxic megacolon (04 Apr 2020 14:38)    PAST MEDICAL & SURGICAL HISTORY:  Anemia  ETOH abuse  HLD (hyperlipidemia)  HTN (hypertension)  DM (diabetes mellitus)  No significant past surgical history    Current Nutrition Order:  Consistent Carbohydrate Clear Liquid diet    PO Intake: Good (%) [   ]  Fair (50-75%) [   ] Poor (<25%) [   ]-N/A as pt on CLD    GI Issues: +ileostomy (1650 mL x24h), NGT removed during the day yesterday, pt tolerating sips with mild nausea this am, denies emesis    Pain: pt c/o some pain- team aware    Skin Integrity: Kendell score 19    Labs:   04-06    140  |  99  |  12  ----------------------------<  150<H>  3.1<L>   |  26  |  0.74    Ca    8.1<L>      06 Apr 2020 05:35  Phos  3.7     04-06  Mg     2.1     04-06    CAPILLARY BLOOD GLUCOSE    POCT Blood Glucose.: 147 mg/dL (06 Apr 2020 13:35)  POCT Blood Glucose.: 162 mg/dL (06 Apr 2020 06:07)  POCT Blood Glucose.: 127 mg/dL (05 Apr 2020 22:47)  POCT Blood Glucose.: 176 mg/dL (05 Apr 2020 18:11)    Medications:  MEDICATIONS  (STANDING):  amLODIPine   Tablet 10 milliGRAM(s) Oral daily  aspirin enteric coated 81 milliGRAM(s) Oral daily  atorvastatin 10 milliGRAM(s) Oral at bedtime  chlorhexidine 0.12% Liquid 15 milliLiter(s) Oral Mucosa every 12 hours  dextrose 5% + sodium chloride 0.45% 1000 milliLiter(s) (100 mL/Hr) IV Continuous <Continuous>  dextrose 5%. 1000 milliLiter(s) (50 mL/Hr) IV Continuous <Continuous>  dextrose 50% Injectable 12.5 Gram(s) IV Push once  dextrose 50% Injectable 25 Gram(s) IV Push once  dextrose 50% Injectable 25 Gram(s) IV Push once  heparin  Injectable 5000 Unit(s) SubCutaneous every 8 hours  insulin lispro (HumaLOG) corrective regimen sliding scale   SubCutaneous Before meals and at bedtime  levothyroxine Injectable 40 MICROGram(s) IV Push at bedtime  lisinopril 40 milliGRAM(s) Oral daily  piperacillin/tazobactam IVPB.. 3.375 Gram(s) IV Intermittent every 6 hours  sodium chloride 0.9%. 1000 milliLiter(s) (50 mL/Hr) IV Continuous <Continuous>    MEDICATIONS  (PRN):  acetaminophen   Tablet .. 650 milliGRAM(s) Oral every 6 hours PRN Temp greater or equal to 38C (100.4F), Mild Pain (1 - 3)  dextrose 40% Gel 15 Gram(s) Oral once PRN Blood Glucose LESS THAN 70 milliGRAM(s)/deciliter  glucagon  Injectable 1 milliGRAM(s) IntraMuscular once PRN Glucose LESS THAN 70 milligrams/deciliter  hydrALAZINE Injectable 10 milliGRAM(s) IV Push every 4 hours PRN SBP >170  HYDROmorphone  Injectable 0.5 milliGRAM(s) IV Push every 3 hours PRN Moderate Pain (4 - 6)  labetalol Injectable 20 milliGRAM(s) IV Push every 6 hours PRN 2nd line drug, Systolic blood pressure > 170    Weight:  90.7kg (3/31)     Weight Change: No updated weights. Please obtain current weight and trend bi-weekly weights for further assessment.    Nutrition Focused Physical Exam: Completed [   ]  Not Pertinent [   ]-N/A 2/2 clinical status    Estimated energy needs:   Ht (3/31): 170.2cm, Wt (3/31): 90.7kg, IBW: 67.3kg +/-10%, %IBW: 134%, BMI: 31.3   IBW used to calculate energy needs due to pt's current body weight exceeding 100% of IBW in ICU setting. Needs adjusted for post-op. Aim for higher end of kcal/protein range.    7595-8515 kcal (25-30 kcal/kg)  81-94gm protein (1.2-1.4gm protein/kg)  Fluid needs per team    Subjective: 59 y/o M with PMH of HTN, DM complicated by neuropathy, EtOH liver cirrhosis, s/p subtotal colectomy and end ileostomy on 3/31 for toxic megacolon, Pt successfully extubated on 3/31, advanced to CLD today. Pt tolerating despite slight nausea this AM. Plan for slow diet advancement per team. Please see below for full nutritional recommendations- d/w team. RD to monitor and f/u per protocol.    Previous Nutrition Diagnosis:  Increased Nutrient Needs (protein) RT increased demand for nutrient AEB post-op, vented.   Active [   ]  Resolved [   ]-N/A    PES: Increased Nutrient Needs (protein) RT increased nutrient demand AEB post-op    Goal: Meet >75%EER via most appropriate route with good tolerance within 24-48h    Recommendations:  1. Recommend continue CSTCHO, CLD as tolerated  2. Recommend advance towards CSTCHO, DASH/TLC, low fiber as medically appropriate   >>monitor need for ONS as diet advances  2. Monitor labs closely (BM, lytes, [id number])  3. Obtain current weight and trend bi-weekly weights for further assessment    Education: N/A 2/2 clinical status, RD to f/u with diet education per protocol    Risk Level: High [ X  ]  Moderate [   ] Low [   ] Admitting Diagnosis:   Patient is a 60y old  Male who presents with a chief complaint of toxic megacolon (04 Apr 2020 14:38)    PAST MEDICAL & SURGICAL HISTORY:  Anemia  ETOH abuse  HLD (hyperlipidemia)  HTN (hypertension)  DM (diabetes mellitus)  No significant past surgical history    Current Nutrition Order:  Consistent Carbohydrate Clear Liquid diet    PO Intake: Good (%) [   ]  Fair (50-75%) [   ] Poor (<25%) [   ]-N/A as pt on CLD    GI Issues: +ileostomy (1650 mL x24h), NGT removed during the day yesterday, pt tolerating sips with mild nausea this am, denies emesis    Pain: pt c/o some pain- team aware    Skin Integrity: Kendell score 19    Labs:   04-06    140  |  99  |  12  ----------------------------<  150<H>  3.1<L>   |  26  |  0.74    Ca    8.1<L>      06 Apr 2020 05:35  Phos  3.7     04-06  Mg     2.1     04-06    CAPILLARY BLOOD GLUCOSE    POCT Blood Glucose.: 147 mg/dL (06 Apr 2020 13:35)  POCT Blood Glucose.: 162 mg/dL (06 Apr 2020 06:07)  POCT Blood Glucose.: 127 mg/dL (05 Apr 2020 22:47)  POCT Blood Glucose.: 176 mg/dL (05 Apr 2020 18:11)    Medications:  MEDICATIONS  (STANDING):  amLODIPine   Tablet 10 milliGRAM(s) Oral daily  aspirin enteric coated 81 milliGRAM(s) Oral daily  atorvastatin 10 milliGRAM(s) Oral at bedtime  chlorhexidine 0.12% Liquid 15 milliLiter(s) Oral Mucosa every 12 hours  dextrose 5% + sodium chloride 0.45% 1000 milliLiter(s) (100 mL/Hr) IV Continuous <Continuous>  dextrose 5%. 1000 milliLiter(s) (50 mL/Hr) IV Continuous <Continuous>  dextrose 50% Injectable 12.5 Gram(s) IV Push once  dextrose 50% Injectable 25 Gram(s) IV Push once  dextrose 50% Injectable 25 Gram(s) IV Push once  heparin  Injectable 5000 Unit(s) SubCutaneous every 8 hours  insulin lispro (HumaLOG) corrective regimen sliding scale   SubCutaneous Before meals and at bedtime  levothyroxine Injectable 40 MICROGram(s) IV Push at bedtime  lisinopril 40 milliGRAM(s) Oral daily  piperacillin/tazobactam IVPB.. 3.375 Gram(s) IV Intermittent every 6 hours  sodium chloride 0.9%. 1000 milliLiter(s) (50 mL/Hr) IV Continuous <Continuous>    MEDICATIONS  (PRN):  acetaminophen   Tablet .. 650 milliGRAM(s) Oral every 6 hours PRN Temp greater or equal to 38C (100.4F), Mild Pain (1 - 3)  dextrose 40% Gel 15 Gram(s) Oral once PRN Blood Glucose LESS THAN 70 milliGRAM(s)/deciliter  glucagon  Injectable 1 milliGRAM(s) IntraMuscular once PRN Glucose LESS THAN 70 milligrams/deciliter  hydrALAZINE Injectable 10 milliGRAM(s) IV Push every 4 hours PRN SBP >170  HYDROmorphone  Injectable 0.5 milliGRAM(s) IV Push every 3 hours PRN Moderate Pain (4 - 6)  labetalol Injectable 20 milliGRAM(s) IV Push every 6 hours PRN 2nd line drug, Systolic blood pressure > 170    Weight:  90.7kg (3/31)     Weight Change: No updated weights. Please obtain current weight and trend bi-weekly weights for further assessment.    Nutrition Focused Physical Exam: Completed [   ]  Not Pertinent [   ]-N/A 2/2 clinical status    Estimated energy needs:   Ht (3/31): 170.2cm, Wt (3/31): 90.7kg, IBW: 67.3kg +/-10%, %IBW: 134%, BMI: 31.3   IBW used to calculate energy needs due to pt's current body weight exceeding 100% of IBW in ICU setting. Needs adjusted for post-op. Aim for higher end of kcal/protein range.    2143-5225 kcal (25-30 kcal/kg)  81-94gm protein (1.2-1.4gm protein/kg)  Fluid needs per team    Subjective: 61 y/o M with PMH of HTN, DM complicated by neuropathy, EtOH liver cirrhosis, s/p subtotal colectomy and end ileostomy on 3/31 for toxic megacolon, Pt successfully extubated on 3/31, advanced to CLD today. Pt tolerating despite slight nausea this AM. Plan for slow diet advancement per team. Please see below for full nutritional recommendations- d/w team. RD to monitor and f/u per protocol.    Previous Nutrition Diagnosis:  Increased Nutrient Needs (protein) RT increased demand for nutrient AEB post-op, vented.   Active [   ]  Resolved [   ]-N/A    PES: Increased Nutrient Needs (protein) RT increased nutrient demand AEB post-op    Goal: Meet >75%EER via most appropriate route with good tolerance within 24-48h    Recommendations:  1. Recommend continue CSTCHO, CLD as tolerated  2. Recommend advance towards CSTCHO, DASH/TLC, low fiber as medically appropriate   >>monitor need for ONS as diet advances  3. Monitor labs closely (BM, lytes, [id number])  4. Obtain current weight and trend bi-weekly weights for further assessment    Education: N/A 2/2 clinical status, RD to f/u with diet education per protocol    Risk Level: High [ X  ]  Moderate [   ] Low [   ]

## 2020-04-06 NOTE — PROGRESS NOTE ADULT - ASSESSMENT
59 y/o M with PMH of HTN, DM complicated by neuropathy, EtOH liver cirrhosis, presenting w/ pneumatosis admitted to SICU for hemodynamic monitoring.  Now s/p sub total colectomy and end ileostomy on 3/31    Neuro: CT head negative for bleed or fracture. Awake and alert.   CV: HDS but hypertensive, lisinopril 40 mg and amlodipine 10mg, Labetalol 20 mg PRN BP >170, hydralazine 10 mg prn  Pulm: Extubated, doing well on RA  FENGI: Sips; Ileostomy pink, ostomy functioning  : d/c soliz 4/2, voiding well  ID: zosyn (3/30 - 4/6 )  Endo: ISS , lantus 14, synthroid  FEN: D5 1/2NS @ 100, added NS to go with KCl for dilution  PPx: SCDs, SQH  PT/OT: initial evaluation 4/3 59 y/o M with PMH of HTN, DM complicated by neuropathy, EtOH liver cirrhosis, presenting w/ pneumatosis admitted to SICU for hemodynamic monitoring.  Now s/p sub total colectomy and end ileostomy on 3/31    Neuro: CT head negative for bleed or fracture. Awake and alert.   CV: HDS but hypertensive, lisinopril 40 mg and amlodipine 10mg, Labetalol 20 mg PRN BP >170, hydralazine 10 mg prn  Pulm: Extubated, doing well on RA  FENGI: Sips; Ileostomy pink, ostomy functioning  : d/c soliz 4/2, voiding well  ID: zosyn (3/30 - 4/6 )  Endo: ISS , lantus 14, synthroid  FEN: D5 1/2NS @ 100, added NS to go with KCl for dilution  PPx: SCDs, SQH  PT/OT: initial evaluation 4/3      SICU Chief addendum: 60 M s/p ex lap, sub-total colectomy and end ileostomy 3/31. No acute events. Pt is afebrile, hemodynamically stable. NGT removed yesterday. Having appropriate ileostomy function. Voiding adequately. Awaiting step-down. Agree with remainder of plan, as above.

## 2020-04-06 NOTE — PROGRESS NOTE ADULT - ASSESSMENT
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  Now s/p sub total colectomy and end ileostomy on 3/31    Neuro: CT head negative for bleed or fracture. Awake and alert.   CV: HDS but hypertensive, lisinopril 40 mg and amlodipine 10mg, Labetalol 20 mg PRN BP >170, hydralazine 10 mg prn  Pulm: Extubated, doing well on RA  FENGI: Sips; Ileostomy pink, ostomy functioning on 4/4  : d/c soliz 4/2, voiding well  ID: zosyn (3/30 - 4/6 )  Endo: ISS , lantus 14, synthroid  FEN: D5 1/2NS @ 100, added NS to go with KCl for dilution  PPx: SCDs, SQH  PT/OT: initial evaluation 4/3    Contact social work for dispo planning  Wound care consult  Wound vac placement while in hospital

## 2020-04-06 NOTE — PROGRESS NOTE ADULT - SUBJECTIVE AND OBJECTIVE BOX
Patient is a 60y old  Male who presents with a chief complaint of toxic megacolon (04 Apr 2020 14:38)      INTERVAL HPI/OVERNIGHT EVENTS: Afebrile, hypertensive overnight SBP 170s, LUCA overnight. NGT removed during the day yesterday, pt tolerating sips with mild nausea this am, denies emesis. Ileostomy functioning well with 200 outpt overnight.  Voiding independently with appropriate UO.       MEDICATIONS  (STANDING):  amLODIPine   Tablet 10 milliGRAM(s) Oral daily  aspirin enteric coated 81 milliGRAM(s) Oral daily  atorvastatin 10 milliGRAM(s) Oral at bedtime  chlorhexidine 0.12% Liquid 15 milliLiter(s) Oral Mucosa every 12 hours  dextrose 5% + sodium chloride 0.45% 1000 milliLiter(s) (100 mL/Hr) IV Continuous <Continuous>  dextrose 5%. 1000 milliLiter(s) (50 mL/Hr) IV Continuous <Continuous>  dextrose 50% Injectable 12.5 Gram(s) IV Push once  dextrose 50% Injectable 25 Gram(s) IV Push once  dextrose 50% Injectable 25 Gram(s) IV Push once  heparin  Injectable 5000 Unit(s) SubCutaneous every 8 hours  insulin lispro (HumaLOG) corrective regimen sliding scale   SubCutaneous Before meals and at bedtime  levothyroxine Injectable 40 MICROGram(s) IV Push at bedtime  lisinopril 40 milliGRAM(s) Oral daily  piperacillin/tazobactam IVPB.. 3.375 Gram(s) IV Intermittent every 6 hours  potassium chloride   Powder 40 milliEquivalent(s) Oral every 2 hours  sodium chloride 0.9%. 1000 milliLiter(s) (50 mL/Hr) IV Continuous <Continuous>    MEDICATIONS  (PRN):  acetaminophen   Tablet .. 650 milliGRAM(s) Oral every 6 hours PRN Mild Pain (1-3)  dextrose 40% Gel 15 Gram(s) Oral once PRN Blood Glucose LESS THAN 70 milliGRAM(s)/deciliter  glucagon  Injectable 1 milliGRAM(s) IntraMuscular once PRN Glucose LESS THAN 70 milligrams/deciliter  hydrALAZINE Injectable 10 milliGRAM(s) IV Push every 4 hours PRN SBP >170  HYDROmorphone  Injectable 0.5 milliGRAM(s) IV Push every 3 hours PRN Moderate Pain (4 - 6)  labetalol Injectable 20 milliGRAM(s) IV Push every 6 hours PRN 2nd line drug, Systolic blood pressure > 170      PHYSICAL EXAM:    ICU Vital Signs Last 24 Hrs  T(C): 37.1 (06 Apr 2020 06:20), Max: 37.1 (06 Apr 2020 06:20)  T(F): 98.7 (06 Apr 2020 06:20), Max: 98.7 (06 Apr 2020 06:20)  HR: 72 (06 Apr 2020 07:20) (61 - 92)  BP: 153/71 (06 Apr 2020 07:20) (90/65 - 205/84)  BP(mean): 102 (06 Apr 2020 07:20) (74 - 142)  ABP: --  ABP(mean): --  RR: 20 (06 Apr 2020 07:20) (16 - 32)  SpO2: 97% (06 Apr 2020 07:20) (94% - 99%)    I&O's Summary    05 Apr 2020 07:01  -  06 Apr 2020 07:00  --------------------------------------------------------  IN: 1250 mL / OUT: 2800 mL / NET: -1550 mL    06 Apr 2020 07:01  -  06 Apr 2020 08:57  --------------------------------------------------------  IN: 50 mL / OUT: 0 mL / NET: 50 mL        GENERAL: NAD  NEURO : Awake and alert  EYES: EOMI, PERRLA, conjunctiva and sclera clear  ENT: Moist mucous membranes  CHEST/LUNG: B/L good air entry, satting well on RA  HEART: S1S2 normal, no S3, sinus rhythm   ABDOMEN: Soft, mild distention, appropriately tender, incisions c/d/i, granulating well, ileostomy pink and functioning well with green-brown output.   EXTREMITIES:  2+ Peripheral Pulses, No LE edema      LABS:                        9.4    4.16  )-----------( 140      ( 06 Apr 2020 05:35 )             29.3     04-06    140  |  99  |  12  ----------------------------<  150<H>  3.1<L>   |  26  |  0.74    Ca    8.1<L>      06 Apr 2020 05:35  Phos  3.7     04-06  Mg     2.1     04-06          CAPILLARY BLOOD GLUCOSE      POCT Blood Glucose.: 162 mg/dL (06 Apr 2020 06:07)  POCT Blood Glucose.: 127 mg/dL (05 Apr 2020 22:47)  POCT Blood Glucose.: 176 mg/dL (05 Apr 2020 18:11)  POCT Blood Glucose.: 191 mg/dL (05 Apr 2020 12:35)        RADIOLOGY & ADDITIONAL TESTS:    Consultant(s) Notes Reviewed:  [x ] YES  [ ] NO    Care Discussed with Consultants/Other Providers [ x] YES  [ ] NO

## 2020-04-07 LAB
ANION GAP SERPL CALC-SCNC: 12 MMOL/L — SIGNIFICANT CHANGE UP (ref 5–17)
BUN SERPL-MCNC: 12 MG/DL — SIGNIFICANT CHANGE UP (ref 7–23)
CALCIUM SERPL-MCNC: 7.6 MG/DL — LOW (ref 8.4–10.5)
CHLORIDE SERPL-SCNC: 100 MMOL/L — SIGNIFICANT CHANGE UP (ref 96–108)
CO2 SERPL-SCNC: 22 MMOL/L — SIGNIFICANT CHANGE UP (ref 22–31)
CREAT SERPL-MCNC: 0.71 MG/DL — SIGNIFICANT CHANGE UP (ref 0.5–1.3)
GLUCOSE BLDC GLUCOMTR-MCNC: 175 MG/DL — HIGH (ref 70–99)
GLUCOSE BLDC GLUCOMTR-MCNC: 270 MG/DL — HIGH (ref 70–99)
GLUCOSE BLDC GLUCOMTR-MCNC: 273 MG/DL — HIGH (ref 70–99)
GLUCOSE BLDC GLUCOMTR-MCNC: 281 MG/DL — HIGH (ref 70–99)
GLUCOSE SERPL-MCNC: 175 MG/DL — HIGH (ref 70–99)
HCT VFR BLD CALC: 26.5 % — LOW (ref 39–50)
HGB BLD-MCNC: 8.8 G/DL — LOW (ref 13–17)
MAGNESIUM SERPL-MCNC: 1.8 MG/DL — SIGNIFICANT CHANGE UP (ref 1.6–2.6)
MCHC RBC-ENTMCNC: 29.3 PG — SIGNIFICANT CHANGE UP (ref 27–34)
MCHC RBC-ENTMCNC: 33.2 GM/DL — SIGNIFICANT CHANGE UP (ref 32–36)
MCV RBC AUTO: 88.3 FL — SIGNIFICANT CHANGE UP (ref 80–100)
NRBC # BLD: 0 /100 WBCS — SIGNIFICANT CHANGE UP (ref 0–0)
PHOSPHATE SERPL-MCNC: 2.4 MG/DL — LOW (ref 2.5–4.5)
PLATELET # BLD AUTO: 164 K/UL — SIGNIFICANT CHANGE UP (ref 150–400)
POTASSIUM SERPL-MCNC: 3 MMOL/L — LOW (ref 3.5–5.3)
POTASSIUM SERPL-SCNC: 3 MMOL/L — LOW (ref 3.5–5.3)
RBC # BLD: 3 M/UL — LOW (ref 4.2–5.8)
RBC # FLD: 13.7 % — SIGNIFICANT CHANGE UP (ref 10.3–14.5)
SODIUM SERPL-SCNC: 134 MMOL/L — LOW (ref 135–145)
WBC # BLD: 8.59 K/UL — SIGNIFICANT CHANGE UP (ref 3.8–10.5)
WBC # FLD AUTO: 8.59 K/UL — SIGNIFICANT CHANGE UP (ref 3.8–10.5)

## 2020-04-07 PROCEDURE — 99232 SBSQ HOSP IP/OBS MODERATE 35: CPT | Mod: GC

## 2020-04-07 RX ORDER — GABAPENTIN 400 MG/1
100 CAPSULE ORAL EVERY 6 HOURS
Refills: 0 | Status: DISCONTINUED | OUTPATIENT
Start: 2020-04-07 | End: 2020-04-15

## 2020-04-07 RX ORDER — SODIUM CHLORIDE 9 MG/ML
500 INJECTION INTRAMUSCULAR; INTRAVENOUS; SUBCUTANEOUS ONCE
Refills: 0 | Status: COMPLETED | OUTPATIENT
Start: 2020-04-07 | End: 2020-04-07

## 2020-04-07 RX ORDER — SODIUM CHLORIDE 9 MG/ML
1000 INJECTION INTRAMUSCULAR; INTRAVENOUS; SUBCUTANEOUS
Refills: 0 | Status: DISCONTINUED | OUTPATIENT
Start: 2020-04-07 | End: 2020-04-07

## 2020-04-07 RX ORDER — INSULIN GLARGINE 100 [IU]/ML
7 INJECTION, SOLUTION SUBCUTANEOUS AT BEDTIME
Refills: 0 | Status: DISCONTINUED | OUTPATIENT
Start: 2020-04-07 | End: 2020-04-12

## 2020-04-07 RX ORDER — OXYCODONE AND ACETAMINOPHEN 5; 325 MG/1; MG/1
1 TABLET ORAL EVERY 6 HOURS
Refills: 0 | Status: DISCONTINUED | OUTPATIENT
Start: 2020-04-07 | End: 2020-04-14

## 2020-04-07 RX ORDER — POTASSIUM CHLORIDE 20 MEQ
40 PACKET (EA) ORAL
Refills: 0 | Status: COMPLETED | OUTPATIENT
Start: 2020-04-07 | End: 2020-04-07

## 2020-04-07 RX ADMIN — INSULIN GLARGINE 7 UNIT(S): 100 INJECTION, SOLUTION SUBCUTANEOUS at 23:01

## 2020-04-07 RX ADMIN — AMLODIPINE BESYLATE 10 MILLIGRAM(S): 2.5 TABLET ORAL at 06:05

## 2020-04-07 RX ADMIN — Medication 6: at 11:54

## 2020-04-07 RX ADMIN — GABAPENTIN 100 MILLIGRAM(S): 400 CAPSULE ORAL at 13:18

## 2020-04-07 RX ADMIN — Medication 40 MILLIEQUIVALENT(S): at 15:14

## 2020-04-07 RX ADMIN — SODIUM CHLORIDE 1000 MILLILITER(S): 9 INJECTION INTRAMUSCULAR; INTRAVENOUS; SUBCUTANEOUS at 16:53

## 2020-04-07 RX ADMIN — CHLORHEXIDINE GLUCONATE 15 MILLILITER(S): 213 SOLUTION TOPICAL at 06:05

## 2020-04-07 RX ADMIN — OXYCODONE AND ACETAMINOPHEN 1 TABLET(S): 5; 325 TABLET ORAL at 11:26

## 2020-04-07 RX ADMIN — HYDROMORPHONE HYDROCHLORIDE 0.5 MILLIGRAM(S): 2 INJECTION INTRAMUSCULAR; INTRAVENOUS; SUBCUTANEOUS at 06:05

## 2020-04-07 RX ADMIN — Medication 6: at 16:37

## 2020-04-07 RX ADMIN — ATORVASTATIN CALCIUM 10 MILLIGRAM(S): 80 TABLET, FILM COATED ORAL at 22:35

## 2020-04-07 RX ADMIN — HEPARIN SODIUM 5000 UNIT(S): 5000 INJECTION INTRAVENOUS; SUBCUTANEOUS at 15:16

## 2020-04-07 RX ADMIN — HEPARIN SODIUM 5000 UNIT(S): 5000 INJECTION INTRAVENOUS; SUBCUTANEOUS at 22:00

## 2020-04-07 RX ADMIN — Medication 650 MILLIGRAM(S): at 11:27

## 2020-04-07 RX ADMIN — Medication 2: at 07:13

## 2020-04-07 RX ADMIN — Medication 40 MICROGRAM(S): at 22:36

## 2020-04-07 RX ADMIN — LISINOPRIL 40 MILLIGRAM(S): 2.5 TABLET ORAL at 06:05

## 2020-04-07 RX ADMIN — Medication 650 MILLIGRAM(S): at 21:14

## 2020-04-07 RX ADMIN — HEPARIN SODIUM 5000 UNIT(S): 5000 INJECTION INTRAVENOUS; SUBCUTANEOUS at 06:05

## 2020-04-07 RX ADMIN — Medication 81 MILLIGRAM(S): at 13:18

## 2020-04-07 RX ADMIN — GABAPENTIN 100 MILLIGRAM(S): 400 CAPSULE ORAL at 19:22

## 2020-04-07 RX ADMIN — Medication 40 MILLIEQUIVALENT(S): at 13:19

## 2020-04-07 RX ADMIN — SODIUM CHLORIDE 50 MILLILITER(S): 9 INJECTION INTRAMUSCULAR; INTRAVENOUS; SUBCUTANEOUS at 15:14

## 2020-04-07 NOTE — PROGRESS NOTE ADULT - ATTENDING COMMENTS
Covering Acute Care Surgeon    Tolerating most of clear liquids.  Ileostomy functioning with air and liquid stool (reported to be mostly liquid). 350cc/24h recorded  AFVSS  Abd soft, moderately distended. Non-tender. VAC on.  Ileostomy almost flush, but pink    WBC normal.    A/P: POD 7 s/p STC and end ileostomy. Resolving ileus  1. Off antibiotics.  2. Monitor ileostomy output  3. Full liquid diet  4. OOB, IS, PT  5. VAC

## 2020-04-07 NOTE — ADVANCED PRACTICE NURSE CONSULT - ASSESSMENT
59 y/o M with PMH of HTN, DM complicated by neuropathy, EtOH liver cirrhosis, presenting w/ pneumatosis admitted to SICU for hemodynamic monitoring.  Now s/p sub total colectomy and end ileostomy on 3/31. Began educating patient on ostomy care by giving pt basic explanation on what stoma is and that stool will come from it. New 1 3/4" Angi 2 piece appliance used with convex skin barrier, stoma slightly flush to abdomen, approx 1 1/8", dark red in color, dark brown liquid effluent in pouch. Skin tear to right side of lower abdomen, applied hydrogel to site and covered with telfa.

## 2020-04-07 NOTE — PROGRESS NOTE ADULT - SUBJECTIVE AND OBJECTIVE BOX
Patient is a 60y old  Male who presents with a chief complaint of toxic megacolon (04 Apr 2020 14:38)      INTERVAL HPI/OVERNIGHT EVENTS: Afebrile, htnsive SBP 170s, LUCA overnight. Tolerating diet, denies nausea/vomiting, advanced to full liquid diet this am. Ileostomy functioning with 150cc output overnight. Voiding independently without issues. Wound vac changed at bedside today along with ostomy nursing teaching.       MEDICATIONS  (STANDING):  amLODIPine   Tablet 10 milliGRAM(s) Oral daily  aspirin enteric coated 81 milliGRAM(s) Oral daily  atorvastatin 10 milliGRAM(s) Oral at bedtime  chlorhexidine 0.12% Liquid 15 milliLiter(s) Oral Mucosa every 12 hours  dextrose 5% + sodium chloride 0.45% 1000 milliLiter(s) (100 mL/Hr) IV Continuous <Continuous>  dextrose 5%. 1000 milliLiter(s) (50 mL/Hr) IV Continuous <Continuous>  dextrose 50% Injectable 12.5 Gram(s) IV Push once  dextrose 50% Injectable 25 Gram(s) IV Push once  dextrose 50% Injectable 25 Gram(s) IV Push once  gabapentin 100 milliGRAM(s) Oral every 6 hours  heparin  Injectable 5000 Unit(s) SubCutaneous every 8 hours  insulin lispro (HumaLOG) corrective regimen sliding scale   SubCutaneous Before meals and at bedtime  levothyroxine Injectable 40 MICROGram(s) IV Push at bedtime  lisinopril 40 milliGRAM(s) Oral daily  sodium chloride 0.9%. 1000 milliLiter(s) (50 mL/Hr) IV Continuous <Continuous>    MEDICATIONS  (PRN):  acetaminophen   Tablet .. 650 milliGRAM(s) Oral every 6 hours PRN Temp greater or equal to 38C (100.4F), Mild Pain (1 - 3)  dextrose 40% Gel 15 Gram(s) Oral once PRN Blood Glucose LESS THAN 70 milliGRAM(s)/deciliter  glucagon  Injectable 1 milliGRAM(s) IntraMuscular once PRN Glucose LESS THAN 70 milligrams/deciliter  hydrALAZINE Injectable 10 milliGRAM(s) IV Push every 4 hours PRN SBP >170  labetalol Injectable 20 milliGRAM(s) IV Push every 6 hours PRN 2nd line drug, Systolic blood pressure > 170  oxycodone    5 mG/acetaminophen 325 mG 1 Tablet(s) Oral every 6 hours PRN Severe Pain (7 - 10)      PHYSICAL EXAM:    ICU Vital Signs Last 24 Hrs  T(C): 36.6 (07 Apr 2020 08:21), Max: 38.1 (06 Apr 2020 16:42)  T(F): 97.9 (07 Apr 2020 08:21), Max: 100.5 (06 Apr 2020 16:42)  HR: 109 (07 Apr 2020 08:21) (80 - 109)  BP: 169/79 (07 Apr 2020 08:21) (145/79 - 194/85)  BP(mean): 114 (07 Apr 2020 08:21) (101 - 122)  ABP: --  ABP(mean): --  RR: 30 (07 Apr 2020 08:21) (16 - 32)  SpO2: 99% (07 Apr 2020 08:21) (95% - 99%)    I&O's Summary    06 Apr 2020 07:01  -  07 Apr 2020 07:00  --------------------------------------------------------  IN: 50 mL / OUT: 1450 mL / NET: -1400 mL    07 Apr 2020 07:01  -  07 Apr 2020 10:58  --------------------------------------------------------  IN: 100 mL / OUT: 300 mL / NET: -200 mL      GENERAL: NAD  NEURO : Awake and alert  EYES: EOMI, PERRLA, conjunctiva and sclera clear  ENT: Moist mucous membranes  CHEST/LUNG: B/L good air entry, satting well on RA  HEART: S1S2 normal, no S3, sinus rhythm   ABDOMEN: Soft, mild distention, appropriately tender, wound vac in place to suction ileostomy pink and functioning well with green-brown output.   EXTREMITIES:  2+ Peripheral Pulses, No LE edema    LABS:                        8.8    8.59  )-----------( 164      ( 07 Apr 2020 08:09 )             26.5     04-07    134<L>  |  100  |  12  ----------------------------<  175<H>  3.0<L>   |  22  |  0.71    Ca    7.6<L>      07 Apr 2020 08:09  Phos  2.4     04-07  Mg     1.8     04-07          CAPILLARY BLOOD GLUCOSE      POCT Blood Glucose.: 175 mg/dL (07 Apr 2020 06:54)  POCT Blood Glucose.: 210 mg/dL (06 Apr 2020 22:57)  POCT Blood Glucose.: 207 mg/dL (06 Apr 2020 18:11)  POCT Blood Glucose.: 147 mg/dL (06 Apr 2020 13:35)        RADIOLOGY & ADDITIONAL TESTS:    Consultant(s) Notes Reviewed:  [x ] YES  [ ] NO    Care Discussed with Consultants/Other Providers [ x] YES  [ ] NO

## 2020-04-07 NOTE — PROGRESS NOTE ADULT - SUBJECTIVE AND OBJECTIVE BOX
SUBJECTIVE: Feeling well, no complaints, pain controlled, re clears, no N/V, in good spirits.  Patient seen and examined bedside by chief resident.    amLODIPine   Tablet 10 milliGRAM(s) Oral daily  aspirin enteric coated 81 milliGRAM(s) Oral daily  heparin  Injectable 5000 Unit(s) SubCutaneous every 8 hours  hydrALAZINE Injectable 10 milliGRAM(s) IV Push every 4 hours PRN  labetalol Injectable 20 milliGRAM(s) IV Push every 6 hours PRN  lisinopril 40 milliGRAM(s) Oral daily    MEDICATIONS  (PRN):  acetaminophen   Tablet .. 650 milliGRAM(s) Oral every 6 hours PRN Temp greater or equal to 38C (100.4F), Mild Pain (1 - 3)  dextrose 40% Gel 15 Gram(s) Oral once PRN Blood Glucose LESS THAN 70 milliGRAM(s)/deciliter  glucagon  Injectable 1 milliGRAM(s) IntraMuscular once PRN Glucose LESS THAN 70 milligrams/deciliter  hydrALAZINE Injectable 10 milliGRAM(s) IV Push every 4 hours PRN SBP >170  HYDROmorphone  Injectable 0.5 milliGRAM(s) IV Push every 3 hours PRN Moderate Pain (4 - 6)  labetalol Injectable 20 milliGRAM(s) IV Push every 6 hours PRN 2nd line drug, Systolic blood pressure > 170      I&O's Detail    06 Apr 2020 07:01  -  07 Apr 2020 07:00  --------------------------------------------------------  IN:    sodium chloride 0.9%.: 50 mL  Total IN: 50 mL    OUT:    Ileostomy: 350 mL    Voided: 1100 mL  Total OUT: 1450 mL    Total NET: -1400 mL          Vital Signs Last 24 Hrs  T(C): 37.4 (07 Apr 2020 07:28), Max: 38.1 (06 Apr 2020 16:42)  T(F): 99.4 (07 Apr 2020 07:28), Max: 100.5 (06 Apr 2020 16:42)  HR: 99 (07 Apr 2020 07:28) (73 - 106)  BP: 171/77 (07 Apr 2020 07:28) (145/79 - 194/85)  BP(mean): 111 (07 Apr 2020 07:28) (101 - 122)  RR: 16 (07 Apr 2020 07:28) (16 - 37)  SpO2: 96% (07 Apr 2020 07:28) (92% - 99%)    General: NAD, resting comfortably in bed  C/V: NSR  Pulm: Nonlabored breathing, no respiratory distress  Abd: soft, NT/ND, wound vac in place  Extrem: SCDs in place    LABS:                        9.4    4.16  )-----------( 140      ( 06 Apr 2020 05:35 )             29.3     04-06    140  |  99  |  12  ----------------------------<  150<H>  3.1<L>   |  26  |  0.74    Ca    8.1<L>      06 Apr 2020 05:35  Phos  3.7     04-06  Mg     2.1     04-06

## 2020-04-07 NOTE — ADVANCED PRACTICE NURSE CONSULT - RECOMMEDATIONS
Recommend hydrogel to skin tear, spoke with RN Arianna and house staff. Will continue to follow until discharge. Extra supplied at bedside for when pt is discharged.

## 2020-04-07 NOTE — PROGRESS NOTE ADULT - ASSESSMENT
59 y/o M with PMH of HTN, DM complicated by neuropathy, EtOH liver cirrhosis, presenting w/ pneumatosis admitted to SICU for hemodynamic monitoring.  Now s/p sub total colectomy and end ileostomy on 3/31    Neuro: CT head negative for bleed or fracture. Awake and alert.   CV: HDS but hypertensive, lisinopril 40 mg and amlodipine 10mg, Labetalol 20 mg PRN BP >170, hydralazine 10 mg prn  Pulm: Extubated, doing well on RA  FENGI: Sips; Ileostomy pink, ostomy functioning on 4/4  : d/c soliz 4/2, voiding well  ID: zosyn (3/30 - 4/6 )  Endo: ISS , lantus 14, synthroid  FEN: D5 1/2NS @ 100, added NS to go with KCl for dilution  PPx: SCDs, SQH  PT/OT: initial evaluation 4/3  Wounds: wound vac in place, change M/W/F    Plans per SICU team

## 2020-04-07 NOTE — PROGRESS NOTE ADULT - ASSESSMENT
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  Now s/p sub total colectomy and end ileostomy on 3/31    Neuro: CT head negative for bleed or fracture. Awake and alert, started on home Gabapentin, transitioned to oral pain medication regimen  CV: HDS but hypertensive, lisinopril 40 mg and amlodipine 10mg, Labetalol 20 mg PRN BP >170, hydralazine 10 mg prn  Pulm: Extubated, doing well on RA  FENGI: Full liquid consistent carb diet; Ileostomy pink, ostomy functioning  : voiding independently  ID: zosyn (3/30 - 4/6 )  Endo: ISS , lantus 14, synthroid  FEN: D5 1/2NS @ 100, added NS to go with KCl for dilution  PPx: SCDs, SQH  PT/OT: initial evaluation 4/3

## 2020-04-08 LAB
ANION GAP SERPL CALC-SCNC: 13 MMOL/L — SIGNIFICANT CHANGE UP (ref 5–17)
BUN SERPL-MCNC: 15 MG/DL — SIGNIFICANT CHANGE UP (ref 7–23)
CALCIUM SERPL-MCNC: 7.6 MG/DL — LOW (ref 8.4–10.5)
CHLORIDE SERPL-SCNC: 99 MMOL/L — SIGNIFICANT CHANGE UP (ref 96–108)
CO2 SERPL-SCNC: 20 MMOL/L — LOW (ref 22–31)
CREAT SERPL-MCNC: 0.96 MG/DL — SIGNIFICANT CHANGE UP (ref 0.5–1.3)
GLUCOSE BLDC GLUCOMTR-MCNC: 177 MG/DL — HIGH (ref 70–99)
GLUCOSE BLDC GLUCOMTR-MCNC: 222 MG/DL — HIGH (ref 70–99)
GLUCOSE BLDC GLUCOMTR-MCNC: 257 MG/DL — HIGH (ref 70–99)
GLUCOSE BLDC GLUCOMTR-MCNC: 351 MG/DL — HIGH (ref 70–99)
GLUCOSE SERPL-MCNC: 227 MG/DL — HIGH (ref 70–99)
HCT VFR BLD CALC: 26.7 % — LOW (ref 39–50)
HGB BLD-MCNC: 8.6 G/DL — LOW (ref 13–17)
MAGNESIUM SERPL-MCNC: 1.9 MG/DL — SIGNIFICANT CHANGE UP (ref 1.6–2.6)
MCHC RBC-ENTMCNC: 28.6 PG — SIGNIFICANT CHANGE UP (ref 27–34)
MCHC RBC-ENTMCNC: 32.2 GM/DL — SIGNIFICANT CHANGE UP (ref 32–36)
MCV RBC AUTO: 88.7 FL — SIGNIFICANT CHANGE UP (ref 80–100)
NRBC # BLD: 0 /100 WBCS — SIGNIFICANT CHANGE UP (ref 0–0)
PHOSPHATE SERPL-MCNC: 2.3 MG/DL — LOW (ref 2.5–4.5)
PLATELET # BLD AUTO: 154 K/UL — SIGNIFICANT CHANGE UP (ref 150–400)
POTASSIUM SERPL-MCNC: 3.6 MMOL/L — SIGNIFICANT CHANGE UP (ref 3.5–5.3)
POTASSIUM SERPL-SCNC: 3.6 MMOL/L — SIGNIFICANT CHANGE UP (ref 3.5–5.3)
RBC # BLD: 3.01 M/UL — LOW (ref 4.2–5.8)
RBC # FLD: 13.7 % — SIGNIFICANT CHANGE UP (ref 10.3–14.5)
SODIUM SERPL-SCNC: 132 MMOL/L — LOW (ref 135–145)
WBC # BLD: 11.56 K/UL — HIGH (ref 3.8–10.5)
WBC # FLD AUTO: 11.56 K/UL — HIGH (ref 3.8–10.5)

## 2020-04-08 PROCEDURE — 99232 SBSQ HOSP IP/OBS MODERATE 35: CPT | Mod: GC

## 2020-04-08 RX ORDER — TRAZODONE HCL 50 MG
50 TABLET ORAL AT BEDTIME
Refills: 0 | Status: DISCONTINUED | OUTPATIENT
Start: 2020-04-08 | End: 2020-04-15

## 2020-04-08 RX ORDER — SODIUM CHLORIDE 9 MG/ML
500 INJECTION, SOLUTION INTRAVENOUS ONCE
Refills: 0 | Status: COMPLETED | OUTPATIENT
Start: 2020-04-08 | End: 2020-04-08

## 2020-04-08 RX ADMIN — Medication 6: at 06:13

## 2020-04-08 RX ADMIN — Medication 40 MICROGRAM(S): at 22:56

## 2020-04-08 RX ADMIN — GABAPENTIN 100 MILLIGRAM(S): 400 CAPSULE ORAL at 00:24

## 2020-04-08 RX ADMIN — GABAPENTIN 100 MILLIGRAM(S): 400 CAPSULE ORAL at 23:57

## 2020-04-08 RX ADMIN — INSULIN GLARGINE 7 UNIT(S): 100 INJECTION, SOLUTION SUBCUTANEOUS at 23:55

## 2020-04-08 RX ADMIN — ATORVASTATIN CALCIUM 10 MILLIGRAM(S): 80 TABLET, FILM COATED ORAL at 22:32

## 2020-04-08 RX ADMIN — Medication 10: at 16:03

## 2020-04-08 RX ADMIN — GABAPENTIN 100 MILLIGRAM(S): 400 CAPSULE ORAL at 17:49

## 2020-04-08 RX ADMIN — AMLODIPINE BESYLATE 10 MILLIGRAM(S): 2.5 TABLET ORAL at 10:05

## 2020-04-08 RX ADMIN — Medication 6: at 00:14

## 2020-04-08 RX ADMIN — HEPARIN SODIUM 5000 UNIT(S): 5000 INJECTION INTRAVENOUS; SUBCUTANEOUS at 22:35

## 2020-04-08 RX ADMIN — HEPARIN SODIUM 5000 UNIT(S): 5000 INJECTION INTRAVENOUS; SUBCUTANEOUS at 06:12

## 2020-04-08 RX ADMIN — OXYCODONE AND ACETAMINOPHEN 1 TABLET(S): 5; 325 TABLET ORAL at 14:16

## 2020-04-08 RX ADMIN — Medication 4: at 22:32

## 2020-04-08 RX ADMIN — GABAPENTIN 100 MILLIGRAM(S): 400 CAPSULE ORAL at 11:51

## 2020-04-08 RX ADMIN — LISINOPRIL 40 MILLIGRAM(S): 2.5 TABLET ORAL at 06:11

## 2020-04-08 RX ADMIN — Medication 81 MILLIGRAM(S): at 11:51

## 2020-04-08 RX ADMIN — OXYCODONE AND ACETAMINOPHEN 1 TABLET(S): 5; 325 TABLET ORAL at 22:48

## 2020-04-08 RX ADMIN — SODIUM CHLORIDE 1000 MILLILITER(S): 9 INJECTION, SOLUTION INTRAVENOUS at 15:32

## 2020-04-08 RX ADMIN — GABAPENTIN 100 MILLIGRAM(S): 400 CAPSULE ORAL at 06:11

## 2020-04-08 RX ADMIN — HEPARIN SODIUM 5000 UNIT(S): 5000 INJECTION INTRAVENOUS; SUBCUTANEOUS at 14:18

## 2020-04-08 RX ADMIN — Medication 2: at 10:57

## 2020-04-08 NOTE — PROGRESS NOTE ADULT - ASSESSMENT
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  Now s/p sub total colectomy and end ileostomy on 3/31    Neuro: CT head negative for bleed or fracture. Awake and alert.   CV: HDS but hypertensive, lisinopril 40 mg and amlodipine 10mg, Labetalol 20 mg PRN BP >170, hydralazine 10 mg prn  Pulm: Extubated, doing well on RA  FENGI: Sips; Ileostomy pink, ostomy functioning on 4/4  : d/c soliz 4/2, voiding well  ID: zosyn (3/30 - 4/6 )  Endo: ISS , lantus 7, synthroid  FEN: D5 1/2NS @ 100, added NS to go with KCl for dilution  PPx: SCDs, SQH  PT/OT: initial evaluation 4/3  Wounds: wound vac in place, change M/W/F 59 y/o M with PMH of HTN, DM complicated by neuropathy, EtOH liver cirrhosis, presenting w/ pneumatosis admitted to SICU for hemodynamic monitoring.  Now s/p sub total colectomy and end ileostomy on 3/31    Neuro: CT head negative for bleed or fracture. Awake and alert.   CV: HDS but hypertensive, lisinopril 40 mg and amlodipine 10mg, Labetalol 20 mg PRN BP >170, hydralazine 10 mg prn  Pulm: Extubated, doing well on RA  FENGI: Sips; Ileostomy pink, ostomy functioning on 4/4  : d/c soliz 4/2, voiding well  ID: zosyn (3/30 - 4/6 )  Endo: ISS , lantus 7, synthroid  FEN: D5 1/2NS @ 100, added NS to go with KCl for dilution  PPx: SCDs, SQH  PT/OT: initial evaluation 4/3  Wounds: wound vac in place, change M/W/F    Plans per SICU team

## 2020-04-08 NOTE — PROGRESS NOTE ADULT - SUBJECTIVE AND OBJECTIVE BOX
SUBJECTIVE: tolerating diet, no N/V, pain controlled. Patient seen and examined bedside by chief resident.    amLODIPine   Tablet 10 milliGRAM(s) Oral daily  aspirin enteric coated 81 milliGRAM(s) Oral daily  heparin  Injectable 5000 Unit(s) SubCutaneous every 8 hours  hydrALAZINE Injectable 10 milliGRAM(s) IV Push every 4 hours PRN  labetalol Injectable 20 milliGRAM(s) IV Push every 6 hours PRN  lisinopril 40 milliGRAM(s) Oral daily    MEDICATIONS  (PRN):  acetaminophen   Tablet .. 650 milliGRAM(s) Oral every 6 hours PRN Temp greater or equal to 38C (100.4F), Mild Pain (1 - 3)  dextrose 40% Gel 15 Gram(s) Oral once PRN Blood Glucose LESS THAN 70 milliGRAM(s)/deciliter  glucagon  Injectable 1 milliGRAM(s) IntraMuscular once PRN Glucose LESS THAN 70 milligrams/deciliter  hydrALAZINE Injectable 10 milliGRAM(s) IV Push every 4 hours PRN SBP >170  labetalol Injectable 20 milliGRAM(s) IV Push every 6 hours PRN 2nd line drug, Systolic blood pressure > 170  oxycodone    5 mG/acetaminophen 325 mG 1 Tablet(s) Oral every 6 hours PRN Severe Pain (7 - 10)  traZODone 50 milliGRAM(s) Oral at bedtime PRN insomnia      I&O's Detail    07 Apr 2020 07:01  -  08 Apr 2020 07:00  --------------------------------------------------------  IN:    Oral Fluid: 550 mL  Total IN: 550 mL    OUT:    Ileostomy: 1730 mL    VAC (Vacuum Assisted Closure) System: 50 mL    Voided: 1350 mL  Total OUT: 3130 mL    Total NET: -2580 mL          Vital Signs Last 24 Hrs  T(C): 36.8 (08 Apr 2020 08:00), Max: 37.5 (07 Apr 2020 18:12)  T(F): 98.3 (08 Apr 2020 08:00), Max: 99.5 (07 Apr 2020 18:12)  HR: 96 (08 Apr 2020 08:00) (89 - 109)  BP: 131/61 (08 Apr 2020 08:00) (104/55 - 170/84)  BP(mean): 88 (08 Apr 2020 08:00) (75 - 114)  RR: 18 (08 Apr 2020 08:00) (16 - 30)  SpO2: 96% (08 Apr 2020 08:00) (96% - 100%)    General: NAD, resting comfortably in bed  C/V: NSR  Pulm: Nonlabored breathing, no respiratory distress  Abd: mildly distended but soft, nontender, wound vac in place and functioning, stoma slightly dusky but pink ostomy w/ liquid green/brown output  Extrem: SCDs in place    LABS:                        8.6    11.56 )-----------( 154      ( 08 Apr 2020 07:29 )             26.7     04-07    134<L>  |  100  |  12  ----------------------------<  175<H>  3.0<L>   |  22  |  0.71    Ca    7.6<L>      07 Apr 2020 08:09  Phos  2.4     04-07  Mg     1.8     04-07

## 2020-04-08 NOTE — ADVANCED PRACTICE NURSE CONSULT - ASSESSMENT
Pt lying in bed, ostomy pouch full of brown liquid. Emptied pouch and educated patient on how to open and close end of pouch to empty as well as clean the end of the pouch.

## 2020-04-08 NOTE — PROGRESS NOTE ADULT - SUBJECTIVE AND OBJECTIVE BOX
Patient is a 60y old  Male who presents with a chief complaint of toxic megacolon (04 Apr 2020 14:38)      INTERVAL HPI/OVERNIGHT EVENTS: Pt afebrile, VSS overnight, LUCA overnight. Increased hiccups this am, reports tolerating full liquid diet, denies nausea and emesis. OOB/ambulating. Making appropriate UO. Wound vac in place to suction. Ileostomy functioning well with 600 cc dark brown output overnight.    MEDICATIONS  (STANDING):  amLODIPine   Tablet 10 milliGRAM(s) Oral daily  aspirin enteric coated 81 milliGRAM(s) Oral daily  atorvastatin 10 milliGRAM(s) Oral at bedtime  dextrose 5% + sodium chloride 0.45% 1000 milliLiter(s) (100 mL/Hr) IV Continuous <Continuous>  dextrose 5%. 1000 milliLiter(s) (50 mL/Hr) IV Continuous <Continuous>  dextrose 50% Injectable 12.5 Gram(s) IV Push once  dextrose 50% Injectable 25 Gram(s) IV Push once  dextrose 50% Injectable 25 Gram(s) IV Push once  gabapentin 100 milliGRAM(s) Oral every 6 hours  heparin  Injectable 5000 Unit(s) SubCutaneous every 8 hours  insulin glargine Injectable (LANTUS) 7 Unit(s) SubCutaneous at bedtime  insulin lispro (HumaLOG) corrective regimen sliding scale   SubCutaneous Before meals and at bedtime  levothyroxine Injectable 40 MICROGram(s) IV Push at bedtime  lisinopril 40 milliGRAM(s) Oral daily  sodium chloride 0.9%. 1000 milliLiter(s) (50 mL/Hr) IV Continuous <Continuous>    MEDICATIONS  (PRN):  acetaminophen   Tablet .. 650 milliGRAM(s) Oral every 6 hours PRN Temp greater or equal to 38C (100.4F), Mild Pain (1 - 3)  dextrose 40% Gel 15 Gram(s) Oral once PRN Blood Glucose LESS THAN 70 milliGRAM(s)/deciliter  glucagon  Injectable 1 milliGRAM(s) IntraMuscular once PRN Glucose LESS THAN 70 milligrams/deciliter  hydrALAZINE Injectable 10 milliGRAM(s) IV Push every 4 hours PRN SBP >170  labetalol Injectable 20 milliGRAM(s) IV Push every 6 hours PRN 2nd line drug, Systolic blood pressure > 170  oxycodone    5 mG/acetaminophen 325 mG 1 Tablet(s) Oral every 6 hours PRN Severe Pain (7 - 10)  traZODone 50 milliGRAM(s) Oral at bedtime PRN insomnia      PHYSICAL EXAM:    ICU Vital Signs Last 24 Hrs  T(C): 36.8 (08 Apr 2020 08:00), Max: 37.5 (07 Apr 2020 18:12)  T(F): 98.3 (08 Apr 2020 08:00), Max: 99.5 (07 Apr 2020 18:12)  HR: 87 (08 Apr 2020 10:30) (87 - 102)  BP: 151/67 (08 Apr 2020 10:30) (104/55 - 170/84)  BP(mean): 88 (08 Apr 2020 08:00) (75 - 96)  ABP: --  ABP(mean): --  RR: 27 (08 Apr 2020 10:30) (16 - 30)  SpO2: 96% (08 Apr 2020 10:30) (95% - 100%)    I&O's Summary    07 Apr 2020 07:01  -  08 Apr 2020 07:00  --------------------------------------------------------  IN: 550 mL / OUT: 3130 mL / NET: -2580 mL    08 Apr 2020 07:01  -  08 Apr 2020 11:43  --------------------------------------------------------  IN: 380 mL / OUT: 100 mL / NET: 280 mL    GENERAL: NAD  NEURO : Awake and alert  EYES: EOMI, PERRLA, conjunctiva and sclera clear  ENT: Moist mucous membranes  CHEST/LUNG: B/L good air entry, satting well on RA  HEART: S1S2 normal, no S3, sinus rhythm   ABDOMEN: Soft, mild distention, appropriately tender, wound vac in place to suction ileostomy pink and functioning well with green-brown output.   EXTREMITIES:  2+ Peripheral Pulses, No LE edema    LABS:                        8.6    11.56 )-----------( 154      ( 08 Apr 2020 07:29 )             26.7     04-08    132<L>  |  99  |  15  ----------------------------<  227<H>  3.6   |  20<L>  |  0.96    Ca    7.6<L>      08 Apr 2020 07:29  Phos  2.3     04-08  Mg     1.9     04-08          CAPILLARY BLOOD GLUCOSE      POCT Blood Glucose.: 177 mg/dL (08 Apr 2020 10:19)  POCT Blood Glucose.: 257 mg/dL (08 Apr 2020 06:10)  POCT Blood Glucose.: 281 mg/dL (07 Apr 2020 22:59)  POCT Blood Glucose.: 273 mg/dL (07 Apr 2020 16:31)        RADIOLOGY & ADDITIONAL TESTS:    Consultant(s) Notes Reviewed:  [x ] YES  [ ] NO    Care Discussed with Consultants/Other Providers [ x] YES  [ ] NO

## 2020-04-08 NOTE — PROGRESS NOTE ADULT - ASSESSMENT
A/P: 60 M PMH of HTN, DM complicated by neuropathy, EtOH liver cirrhosis, presenting w/ pneumatosis admitted to SICU for hemodynamic monitoring, now s/p sub total colectomy and end ileostomy on 3/31    Neuro: CT head negative for bleed or fracture. Awake and alert.   CV: HDS but hypertensive, lisinopril 40 mg and amlodipine 10mg, Labetalol 20 mg PRN BP >170, hydralazine 10 mg prn  Pulm: saturating well on RA  FENGI: Full liquid diet; Ileostomy pink, ostomy functioning on 4/4  : voiding independently  ID: zosyn (3/30 - 4/6 )  Endo: ISS , lantus 7, synthroid, f/u fs  FEN: D5 1/2NS @ 100, added NS to go with KCl for dilution  PPx: SCDs, SQH  PT/OT: initial evaluation 4/3  Wounds: wound vac in place, change M/W/F  Dispo: pendin placement

## 2020-04-09 LAB
ANION GAP SERPL CALC-SCNC: 12 MMOL/L — SIGNIFICANT CHANGE UP (ref 5–17)
BUN SERPL-MCNC: 14 MG/DL — SIGNIFICANT CHANGE UP (ref 7–23)
CALCIUM SERPL-MCNC: 7.4 MG/DL — LOW (ref 8.4–10.5)
CHLORIDE SERPL-SCNC: 99 MMOL/L — SIGNIFICANT CHANGE UP (ref 96–108)
CO2 SERPL-SCNC: 20 MMOL/L — LOW (ref 22–31)
CREAT SERPL-MCNC: 0.75 MG/DL — SIGNIFICANT CHANGE UP (ref 0.5–1.3)
GLUCOSE BLDC GLUCOMTR-MCNC: 175 MG/DL — HIGH (ref 70–99)
GLUCOSE BLDC GLUCOMTR-MCNC: 177 MG/DL — HIGH (ref 70–99)
GLUCOSE BLDC GLUCOMTR-MCNC: 194 MG/DL — HIGH (ref 70–99)
GLUCOSE BLDC GLUCOMTR-MCNC: 215 MG/DL — HIGH (ref 70–99)
GLUCOSE SERPL-MCNC: 191 MG/DL — HIGH (ref 70–99)
GRAM STN FLD: SIGNIFICANT CHANGE UP
HCT VFR BLD CALC: 23.3 % — LOW (ref 39–50)
HGB BLD-MCNC: 7.9 G/DL — LOW (ref 13–17)
MAGNESIUM SERPL-MCNC: 1.9 MG/DL — SIGNIFICANT CHANGE UP (ref 1.6–2.6)
MCHC RBC-ENTMCNC: 29.7 PG — SIGNIFICANT CHANGE UP (ref 27–34)
MCHC RBC-ENTMCNC: 33.9 GM/DL — SIGNIFICANT CHANGE UP (ref 32–36)
MCV RBC AUTO: 87.6 FL — SIGNIFICANT CHANGE UP (ref 80–100)
NRBC # BLD: 0 /100 WBCS — SIGNIFICANT CHANGE UP (ref 0–0)
PHOSPHATE SERPL-MCNC: 2.7 MG/DL — SIGNIFICANT CHANGE UP (ref 2.5–4.5)
PLATELET # BLD AUTO: 203 K/UL — SIGNIFICANT CHANGE UP (ref 150–400)
POTASSIUM SERPL-MCNC: 3.6 MMOL/L — SIGNIFICANT CHANGE UP (ref 3.5–5.3)
POTASSIUM SERPL-SCNC: 3.6 MMOL/L — SIGNIFICANT CHANGE UP (ref 3.5–5.3)
RBC # BLD: 2.66 M/UL — LOW (ref 4.2–5.8)
RBC # FLD: 13.8 % — SIGNIFICANT CHANGE UP (ref 10.3–14.5)
SODIUM SERPL-SCNC: 131 MMOL/L — LOW (ref 135–145)
SPECIMEN SOURCE: SIGNIFICANT CHANGE UP
WBC # BLD: 9.3 K/UL — SIGNIFICANT CHANGE UP (ref 3.8–10.5)
WBC # FLD AUTO: 9.3 K/UL — SIGNIFICANT CHANGE UP (ref 3.8–10.5)

## 2020-04-09 PROCEDURE — 74018 RADEX ABDOMEN 1 VIEW: CPT | Mod: 26

## 2020-04-09 PROCEDURE — 99232 SBSQ HOSP IP/OBS MODERATE 35: CPT | Mod: GC

## 2020-04-09 PROCEDURE — 71045 X-RAY EXAM CHEST 1 VIEW: CPT | Mod: 26

## 2020-04-09 RX ORDER — SODIUM CHLORIDE 9 MG/ML
500 INJECTION, SOLUTION INTRAVENOUS ONCE
Refills: 0 | Status: COMPLETED | OUTPATIENT
Start: 2020-04-09 | End: 2020-04-09

## 2020-04-09 RX ORDER — PIPERACILLIN AND TAZOBACTAM 4; .5 G/20ML; G/20ML
3.38 INJECTION, POWDER, LYOPHILIZED, FOR SOLUTION INTRAVENOUS EVERY 6 HOURS
Refills: 0 | Status: DISCONTINUED | OUTPATIENT
Start: 2020-04-10 | End: 2020-04-14

## 2020-04-09 RX ORDER — VANCOMYCIN HCL 1 G
1500 VIAL (EA) INTRAVENOUS ONCE
Refills: 0 | Status: COMPLETED | OUTPATIENT
Start: 2020-04-09 | End: 2020-04-10

## 2020-04-09 RX ORDER — POTASSIUM CHLORIDE 20 MEQ
10 PACKET (EA) ORAL
Refills: 0 | Status: COMPLETED | OUTPATIENT
Start: 2020-04-09 | End: 2020-04-09

## 2020-04-09 RX ORDER — HYDROMORPHONE HYDROCHLORIDE 2 MG/ML
1 INJECTION INTRAMUSCULAR; INTRAVENOUS; SUBCUTANEOUS ONCE
Refills: 0 | Status: DISCONTINUED | OUTPATIENT
Start: 2020-04-09 | End: 2020-04-09

## 2020-04-09 RX ORDER — VANCOMYCIN HCL 1 G
1500 VIAL (EA) INTRAVENOUS EVERY 12 HOURS
Refills: 0 | Status: DISCONTINUED | OUTPATIENT
Start: 2020-04-10 | End: 2020-04-13

## 2020-04-09 RX ORDER — ACETAMINOPHEN 500 MG
1000 TABLET ORAL ONCE
Refills: 0 | Status: COMPLETED | OUTPATIENT
Start: 2020-04-09 | End: 2020-04-09

## 2020-04-09 RX ORDER — PIPERACILLIN AND TAZOBACTAM 4; .5 G/20ML; G/20ML
3.38 INJECTION, POWDER, LYOPHILIZED, FOR SOLUTION INTRAVENOUS ONCE
Refills: 0 | Status: COMPLETED | OUTPATIENT
Start: 2020-04-09 | End: 2020-04-09

## 2020-04-09 RX ORDER — MAGNESIUM SULFATE 500 MG/ML
1 VIAL (ML) INJECTION ONCE
Refills: 0 | Status: COMPLETED | OUTPATIENT
Start: 2020-04-09 | End: 2020-04-09

## 2020-04-09 RX ORDER — DIPHENOXYLATE HCL/ATROPINE 2.5-.025MG
2 TABLET ORAL EVERY 6 HOURS
Refills: 0 | Status: DISCONTINUED | OUTPATIENT
Start: 2020-04-09 | End: 2020-04-09

## 2020-04-09 RX ORDER — VANCOMYCIN HCL 1 G
VIAL (EA) INTRAVENOUS
Refills: 0 | Status: DISCONTINUED | OUTPATIENT
Start: 2020-04-10 | End: 2020-04-13

## 2020-04-09 RX ORDER — DIPHENOXYLATE HCL/ATROPINE 2.5-.025MG
1 TABLET ORAL EVERY 6 HOURS
Refills: 0 | Status: DISCONTINUED | OUTPATIENT
Start: 2020-04-09 | End: 2020-04-10

## 2020-04-09 RX ADMIN — HEPARIN SODIUM 5000 UNIT(S): 5000 INJECTION INTRAVENOUS; SUBCUTANEOUS at 06:28

## 2020-04-09 RX ADMIN — Medication 2 TABLET(S): at 14:40

## 2020-04-09 RX ADMIN — GABAPENTIN 100 MILLIGRAM(S): 400 CAPSULE ORAL at 11:47

## 2020-04-09 RX ADMIN — Medication 2 TABLET(S): at 19:49

## 2020-04-09 RX ADMIN — INSULIN GLARGINE 7 UNIT(S): 100 INJECTION, SOLUTION SUBCUTANEOUS at 22:57

## 2020-04-09 RX ADMIN — Medication 100 MILLIEQUIVALENT(S): at 15:14

## 2020-04-09 RX ADMIN — GABAPENTIN 100 MILLIGRAM(S): 400 CAPSULE ORAL at 18:42

## 2020-04-09 RX ADMIN — SODIUM CHLORIDE 1000 MILLILITER(S): 9 INJECTION, SOLUTION INTRAVENOUS at 21:28

## 2020-04-09 RX ADMIN — HEPARIN SODIUM 5000 UNIT(S): 5000 INJECTION INTRAVENOUS; SUBCUTANEOUS at 22:32

## 2020-04-09 RX ADMIN — AMLODIPINE BESYLATE 10 MILLIGRAM(S): 2.5 TABLET ORAL at 10:25

## 2020-04-09 RX ADMIN — Medication 100 MILLIEQUIVALENT(S): at 16:56

## 2020-04-09 RX ADMIN — Medication 400 MILLIGRAM(S): at 22:17

## 2020-04-09 RX ADMIN — GABAPENTIN 100 MILLIGRAM(S): 400 CAPSULE ORAL at 06:27

## 2020-04-09 RX ADMIN — PIPERACILLIN AND TAZOBACTAM 200 GRAM(S): 4; .5 INJECTION, POWDER, LYOPHILIZED, FOR SOLUTION INTRAVENOUS at 23:20

## 2020-04-09 RX ADMIN — Medication 40 MICROGRAM(S): at 22:57

## 2020-04-09 RX ADMIN — Medication 2: at 11:41

## 2020-04-09 RX ADMIN — Medication 650 MILLIGRAM(S): at 07:34

## 2020-04-09 RX ADMIN — LISINOPRIL 40 MILLIGRAM(S): 2.5 TABLET ORAL at 06:27

## 2020-04-09 RX ADMIN — ATORVASTATIN CALCIUM 10 MILLIGRAM(S): 80 TABLET, FILM COATED ORAL at 22:30

## 2020-04-09 RX ADMIN — Medication 100 MILLIEQUIVALENT(S): at 12:56

## 2020-04-09 RX ADMIN — Medication 100 GRAM(S): at 12:57

## 2020-04-09 RX ADMIN — HYDROMORPHONE HYDROCHLORIDE 1 MILLIGRAM(S): 2 INJECTION INTRAMUSCULAR; INTRAVENOUS; SUBCUTANEOUS at 15:51

## 2020-04-09 RX ADMIN — HEPARIN SODIUM 5000 UNIT(S): 5000 INJECTION INTRAVENOUS; SUBCUTANEOUS at 13:49

## 2020-04-09 RX ADMIN — SODIUM CHLORIDE 100 MILLILITER(S): 9 INJECTION INTRAMUSCULAR; INTRAVENOUS; SUBCUTANEOUS at 23:46

## 2020-04-09 RX ADMIN — Medication 81 MILLIGRAM(S): at 11:46

## 2020-04-09 RX ADMIN — Medication 2: at 22:33

## 2020-04-09 RX ADMIN — Medication 4: at 17:44

## 2020-04-09 RX ADMIN — Medication 2: at 06:36

## 2020-04-09 NOTE — PROGRESS NOTE ADULT - ASSESSMENT
59 y/o M with PMH of HTN, DM complicated by neuropathy, EtOH liver cirrhosis, presenting w/ pneumatosis admitted to SICU for hemodynamic monitoring.  Now s/p sub total colectomy and end ileostomy on 3/31    Neuro: CT head negative for bleed or fracture. Awake and alert.   CV: HDS but hypertensive, lisinopril 40 mg and amlodipine 10mg, Labetalol 20 mg PRN BP >170, hydralazine 10 mg prn  Pulm: CED RAMIREZ: FLD; Ileostomy pink, ostomy functioning on 4/4  : Voids  ID: N/A  Endo: ISS , lantus 7, synthroid  PPx: SCDs, SQH, OOBA  PT/OT: initial evaluation 4/3  Wounds: wound vac in place 59 y/o M with PMH of HTN, DM complicated by neuropathy, EtOH liver cirrhosis, presenting w/ pneumatosis admitted to SICU for hemodynamic monitoring. Now s/p sub total colectomy and end ileostomy on 3/31, recovering well currently on telemetry.     - Pain/nausea control  - Lisinopril, amlodipine, prn labetalol/hydralazine  - OOB/IS  - FLD, +ostomy fxn  - Monitor UOP  - ISS, synthroid  - Wvac change T/Th/Sa  - Verify home meds  - PT reassessment

## 2020-04-09 NOTE — PROGRESS NOTE ADULT - SUBJECTIVE AND OBJECTIVE BOX
SUBJECTIVE: Patient seen and examined bedside by chief resident.    amLODIPine   Tablet 10 milliGRAM(s) Oral daily  aspirin enteric coated 81 milliGRAM(s) Oral daily  heparin  Injectable 5000 Unit(s) SubCutaneous every 8 hours  hydrALAZINE Injectable 10 milliGRAM(s) IV Push every 4 hours PRN  labetalol Injectable 20 milliGRAM(s) IV Push every 6 hours PRN  lisinopril 40 milliGRAM(s) Oral daily    MEDICATIONS  (PRN):  acetaminophen   Tablet .. 650 milliGRAM(s) Oral every 6 hours PRN Temp greater or equal to 38C (100.4F), Mild Pain (1 - 3)  dextrose 40% Gel 15 Gram(s) Oral once PRN Blood Glucose LESS THAN 70 milliGRAM(s)/deciliter  glucagon  Injectable 1 milliGRAM(s) IntraMuscular once PRN Glucose LESS THAN 70 milligrams/deciliter  hydrALAZINE Injectable 10 milliGRAM(s) IV Push every 4 hours PRN SBP >170  labetalol Injectable 20 milliGRAM(s) IV Push every 6 hours PRN 2nd line drug, Systolic blood pressure > 170  oxycodone    5 mG/acetaminophen 325 mG 1 Tablet(s) Oral every 6 hours PRN Severe Pain (7 - 10)  traZODone 50 milliGRAM(s) Oral at bedtime PRN insomnia      I&O's Detail    08 Apr 2020 07:01  -  09 Apr 2020 07:00  --------------------------------------------------------  IN:    Lactated Ringers IV Bolus: 500 mL    Oral Fluid: 620 mL  Total IN: 1120 mL    OUT:    Ileostomy: 1650 mL    VAC (Vacuum Assisted Closure) System: 50 mL    Voided: 440 mL  Total OUT: 2140 mL    Total NET: -1020 mL          Vital Signs Last 24 Hrs  T(C): 37.2 (09 Apr 2020 07:03), Max: 37.2 (09 Apr 2020 07:03)  T(F): 98.9 (09 Apr 2020 07:03), Max: 98.9 (09 Apr 2020 07:03)  HR: 80 (09 Apr 2020 07:25) (79 - 100)  BP: 130/60 (09 Apr 2020 07:25) (115/51 - 155/70)  BP(mean): 87 (09 Apr 2020 07:25) (86 - 101)  RR: 18 (09 Apr 2020 07:25) (16 - 27)  SpO2: 98% (09 Apr 2020 07:25) (95% - 99%)    General: NAD, resting comfortably in bed  C/V: NSR  Pulm: Nonlabored breathing, no respiratory distress  Abd: mildly distended but soft, nontender, wound vac in place and functioning, stoma slightly dusky but pink ostomy w/ liquid green/brown output  Extrem: SCDs in place    LABS:                        7.9    9.30  )-----------( 203      ( 09 Apr 2020 06:50 )             23.3     04-09    131<L>  |  99  |  14  ----------------------------<  191<H>  3.6   |  20<L>  |  0.75    Ca    7.4<L>      09 Apr 2020 06:50  Phos  2.7     04-09  Mg     1.9     04-09            RADIOLOGY & ADDITIONAL STUDIES: SUBJECTIVE: Patient seen and examined bedside by chief resident. Bravo diet, no n/v. Pain controlled. No specific complaints.     amLODIPine   Tablet 10 milliGRAM(s) Oral daily  aspirin enteric coated 81 milliGRAM(s) Oral daily  heparin  Injectable 5000 Unit(s) SubCutaneous every 8 hours  hydrALAZINE Injectable 10 milliGRAM(s) IV Push every 4 hours PRN  labetalol Injectable 20 milliGRAM(s) IV Push every 6 hours PRN  lisinopril 40 milliGRAM(s) Oral daily    MEDICATIONS  (PRN):  acetaminophen   Tablet .. 650 milliGRAM(s) Oral every 6 hours PRN Temp greater or equal to 38C (100.4F), Mild Pain (1 - 3)  dextrose 40% Gel 15 Gram(s) Oral once PRN Blood Glucose LESS THAN 70 milliGRAM(s)/deciliter  glucagon  Injectable 1 milliGRAM(s) IntraMuscular once PRN Glucose LESS THAN 70 milligrams/deciliter  hydrALAZINE Injectable 10 milliGRAM(s) IV Push every 4 hours PRN SBP >170  labetalol Injectable 20 milliGRAM(s) IV Push every 6 hours PRN 2nd line drug, Systolic blood pressure > 170  oxycodone    5 mG/acetaminophen 325 mG 1 Tablet(s) Oral every 6 hours PRN Severe Pain (7 - 10)  traZODone 50 milliGRAM(s) Oral at bedtime PRN insomnia      I&O's Detail    08 Apr 2020 07:01  -  09 Apr 2020 07:00  --------------------------------------------------------  IN:    Lactated Ringers IV Bolus: 500 mL    Oral Fluid: 620 mL  Total IN: 1120 mL    OUT:    Ileostomy: 1650 mL    VAC (Vacuum Assisted Closure) System: 50 mL    Voided: 440 mL  Total OUT: 2140 mL    Total NET: -1020 mL          Vital Signs Last 24 Hrs  T(C): 37.2 (09 Apr 2020 07:03), Max: 37.2 (09 Apr 2020 07:03)  T(F): 98.9 (09 Apr 2020 07:03), Max: 98.9 (09 Apr 2020 07:03)  HR: 80 (09 Apr 2020 07:25) (79 - 100)  BP: 130/60 (09 Apr 2020 07:25) (115/51 - 155/70)  BP(mean): 87 (09 Apr 2020 07:25) (86 - 101)  RR: 18 (09 Apr 2020 07:25) (16 - 27)  SpO2: 98% (09 Apr 2020 07:25) (95% - 99%)    General: NAD, resting comfortably in bed  C/V: NSR  Pulm: Nonlabored breathing, no respiratory distress  Abd: soft, mildly distended, nontender, wound vac to suction, stoma slightly dusky but pink ostomy w/ liquid green/brown output  Extrem: SCDs in place    LABS:                        7.9    9.30  )-----------( 203      ( 09 Apr 2020 06:50 )             23.3     04-09    131<L>  |  99  |  14  ----------------------------<  191<H>  3.6   |  20<L>  |  0.75    Ca    7.4<L>      09 Apr 2020 06:50  Phos  2.7     04-09  Mg     1.9     04-09

## 2020-04-10 LAB
ANION GAP SERPL CALC-SCNC: 14 MMOL/L — SIGNIFICANT CHANGE UP (ref 5–17)
BUN SERPL-MCNC: 8 MG/DL — SIGNIFICANT CHANGE UP (ref 7–23)
CALCIUM SERPL-MCNC: 8 MG/DL — LOW (ref 8.4–10.5)
CHLORIDE SERPL-SCNC: 95 MMOL/L — LOW (ref 96–108)
CO2 SERPL-SCNC: 20 MMOL/L — LOW (ref 22–31)
CREAT SERPL-MCNC: 0.74 MG/DL — SIGNIFICANT CHANGE UP (ref 0.5–1.3)
GLUCOSE BLDC GLUCOMTR-MCNC: 182 MG/DL — HIGH (ref 70–99)
GLUCOSE BLDC GLUCOMTR-MCNC: 206 MG/DL — HIGH (ref 70–99)
GLUCOSE BLDC GLUCOMTR-MCNC: 210 MG/DL — HIGH (ref 70–99)
GLUCOSE BLDC GLUCOMTR-MCNC: 218 MG/DL — HIGH (ref 70–99)
GLUCOSE SERPL-MCNC: 213 MG/DL — HIGH (ref 70–99)
HCT VFR BLD CALC: 29.4 % — LOW (ref 39–50)
HGB BLD-MCNC: 9.6 G/DL — LOW (ref 13–17)
MAGNESIUM SERPL-MCNC: 2.1 MG/DL — SIGNIFICANT CHANGE UP (ref 1.6–2.6)
MCHC RBC-ENTMCNC: 29.4 PG — SIGNIFICANT CHANGE UP (ref 27–34)
MCHC RBC-ENTMCNC: 32.7 GM/DL — SIGNIFICANT CHANGE UP (ref 32–36)
MCV RBC AUTO: 90.2 FL — SIGNIFICANT CHANGE UP (ref 80–100)
NRBC # BLD: 0 /100 WBCS — SIGNIFICANT CHANGE UP (ref 0–0)
PHOSPHATE SERPL-MCNC: 2.6 MG/DL — SIGNIFICANT CHANGE UP (ref 2.5–4.5)
PLATELET # BLD AUTO: 336 K/UL — SIGNIFICANT CHANGE UP (ref 150–400)
POTASSIUM SERPL-MCNC: 4 MMOL/L — SIGNIFICANT CHANGE UP (ref 3.5–5.3)
POTASSIUM SERPL-SCNC: 4 MMOL/L — SIGNIFICANT CHANGE UP (ref 3.5–5.3)
RBC # BLD: 3.26 M/UL — LOW (ref 4.2–5.8)
RBC # FLD: 13.5 % — SIGNIFICANT CHANGE UP (ref 10.3–14.5)
SODIUM SERPL-SCNC: 129 MMOL/L — LOW (ref 135–145)
WBC # BLD: 10.89 K/UL — HIGH (ref 3.8–10.5)
WBC # FLD AUTO: 10.89 K/UL — HIGH (ref 3.8–10.5)

## 2020-04-10 PROCEDURE — 99232 SBSQ HOSP IP/OBS MODERATE 35: CPT | Mod: GC

## 2020-04-10 PROCEDURE — 74177 CT ABD & PELVIS W/CONTRAST: CPT | Mod: 26

## 2020-04-10 RX ORDER — ONDANSETRON 8 MG/1
4 TABLET, FILM COATED ORAL EVERY 6 HOURS
Refills: 0 | Status: DISCONTINUED | OUTPATIENT
Start: 2020-04-10 | End: 2020-04-15

## 2020-04-10 RX ORDER — IOHEXOL 300 MG/ML
30 INJECTION, SOLUTION INTRAVENOUS ONCE
Refills: 0 | Status: COMPLETED | OUTPATIENT
Start: 2020-04-10 | End: 2020-04-10

## 2020-04-10 RX ORDER — ONDANSETRON 8 MG/1
4 TABLET, FILM COATED ORAL ONCE
Refills: 0 | Status: COMPLETED | OUTPATIENT
Start: 2020-04-10 | End: 2020-04-10

## 2020-04-10 RX ADMIN — Medication 50 MILLIGRAM(S): at 22:57

## 2020-04-10 RX ADMIN — PIPERACILLIN AND TAZOBACTAM 200 GRAM(S): 4; .5 INJECTION, POWDER, LYOPHILIZED, FOR SOLUTION INTRAVENOUS at 18:26

## 2020-04-10 RX ADMIN — PIPERACILLIN AND TAZOBACTAM 200 GRAM(S): 4; .5 INJECTION, POWDER, LYOPHILIZED, FOR SOLUTION INTRAVENOUS at 23:07

## 2020-04-10 RX ADMIN — Medication 1 TABLET(S): at 05:30

## 2020-04-10 RX ADMIN — Medication 300 MILLIGRAM(S): at 23:03

## 2020-04-10 RX ADMIN — SODIUM CHLORIDE 100 MILLILITER(S): 9 INJECTION INTRAMUSCULAR; INTRAVENOUS; SUBCUTANEOUS at 10:06

## 2020-04-10 RX ADMIN — GABAPENTIN 100 MILLIGRAM(S): 400 CAPSULE ORAL at 12:42

## 2020-04-10 RX ADMIN — Medication 40 MICROGRAM(S): at 21:51

## 2020-04-10 RX ADMIN — Medication 81 MILLIGRAM(S): at 12:41

## 2020-04-10 RX ADMIN — Medication 1 TABLET(S): at 00:14

## 2020-04-10 RX ADMIN — SODIUM CHLORIDE 100 MILLILITER(S): 9 INJECTION INTRAMUSCULAR; INTRAVENOUS; SUBCUTANEOUS at 23:34

## 2020-04-10 RX ADMIN — HEPARIN SODIUM 5000 UNIT(S): 5000 INJECTION INTRAVENOUS; SUBCUTANEOUS at 21:50

## 2020-04-10 RX ADMIN — Medication 4: at 22:07

## 2020-04-10 RX ADMIN — IOHEXOL 30 MILLILITER(S): 300 INJECTION, SOLUTION INTRAVENOUS at 12:08

## 2020-04-10 RX ADMIN — Medication 1 TABLET(S): at 16:57

## 2020-04-10 RX ADMIN — ONDANSETRON 4 MILLIGRAM(S): 8 TABLET, FILM COATED ORAL at 14:14

## 2020-04-10 RX ADMIN — Medication 4: at 07:25

## 2020-04-10 RX ADMIN — PIPERACILLIN AND TAZOBACTAM 200 GRAM(S): 4; .5 INJECTION, POWDER, LYOPHILIZED, FOR SOLUTION INTRAVENOUS at 06:12

## 2020-04-10 RX ADMIN — LISINOPRIL 40 MILLIGRAM(S): 2.5 TABLET ORAL at 05:01

## 2020-04-10 RX ADMIN — Medication 300 MILLIGRAM(S): at 12:24

## 2020-04-10 RX ADMIN — GABAPENTIN 100 MILLIGRAM(S): 400 CAPSULE ORAL at 00:13

## 2020-04-10 RX ADMIN — HEPARIN SODIUM 5000 UNIT(S): 5000 INJECTION INTRAVENOUS; SUBCUTANEOUS at 13:59

## 2020-04-10 RX ADMIN — INSULIN GLARGINE 7 UNIT(S): 100 INJECTION, SOLUTION SUBCUTANEOUS at 21:51

## 2020-04-10 RX ADMIN — Medication 300 MILLIGRAM(S): at 00:15

## 2020-04-10 RX ADMIN — GABAPENTIN 100 MILLIGRAM(S): 400 CAPSULE ORAL at 23:08

## 2020-04-10 RX ADMIN — GABAPENTIN 100 MILLIGRAM(S): 400 CAPSULE ORAL at 05:01

## 2020-04-10 RX ADMIN — GABAPENTIN 100 MILLIGRAM(S): 400 CAPSULE ORAL at 18:26

## 2020-04-10 RX ADMIN — PIPERACILLIN AND TAZOBACTAM 200 GRAM(S): 4; .5 INJECTION, POWDER, LYOPHILIZED, FOR SOLUTION INTRAVENOUS at 11:08

## 2020-04-10 RX ADMIN — Medication 4: at 10:53

## 2020-04-10 RX ADMIN — AMLODIPINE BESYLATE 10 MILLIGRAM(S): 2.5 TABLET ORAL at 10:12

## 2020-04-10 RX ADMIN — HEPARIN SODIUM 5000 UNIT(S): 5000 INJECTION INTRAVENOUS; SUBCUTANEOUS at 05:35

## 2020-04-10 RX ADMIN — Medication 2: at 16:24

## 2020-04-10 RX ADMIN — ATORVASTATIN CALCIUM 10 MILLIGRAM(S): 80 TABLET, FILM COATED ORAL at 22:15

## 2020-04-10 NOTE — CHART NOTE - NSCHARTNOTEFT_GEN_A_CORE
Admitting Diagnosis:   Patient is a 60y old  Male who presents with a chief complaint of toxic megacolon (04 Apr 2020 14:38)    PAST MEDICAL & SURGICAL HISTORY:  Anemia  ETOH abuse  HLD (hyperlipidemia)  HTN (hypertension)  DM (diabetes mellitus)  No significant past surgical history    Current Nutrition Order: NPO except medications    PO Intake: Good (%) [   ]  Fair (50-75%) [   ] Poor (<25%) [   ]- N/A as pt NPO    GI Issues: +ileostomy ([id number])    Pain:    Skin Integrity: Kendell score 14    Labs:   04-10    129<L>  |  95<L>  |  8   ----------------------------<  213<H>  4.0   |  20<L>  |  0.74    Ca    8.0<L>      10 Apr 2020 06:10  Phos  2.6     04-10  Mg     2.1     04-10      CAPILLARY BLOOD GLUCOSE      POCT Blood Glucose.: 218 mg/dL (10 Apr 2020 07:06)  POCT Blood Glucose.: 177 mg/dL (09 Apr 2020 22:07)  POCT Blood Glucose.: 215 mg/dL (09 Apr 2020 17:28)  POCT Blood Glucose.: 175 mg/dL (09 Apr 2020 11:10)      Medications:  MEDICATIONS  (STANDING):  amLODIPine   Tablet 10 milliGRAM(s) Oral daily  aspirin enteric coated 81 milliGRAM(s) Oral daily  atorvastatin 10 milliGRAM(s) Oral at bedtime  dextrose 5%. 1000 milliLiter(s) (50 mL/Hr) IV Continuous <Continuous>  dextrose 50% Injectable 12.5 Gram(s) IV Push once  dextrose 50% Injectable 25 Gram(s) IV Push once  dextrose 50% Injectable 25 Gram(s) IV Push once  diphenoxylate/atropine 1 Tablet(s) Oral every 6 hours  gabapentin 100 milliGRAM(s) Oral every 6 hours  heparin  Injectable 5000 Unit(s) SubCutaneous every 8 hours  insulin glargine Injectable (LANTUS) 7 Unit(s) SubCutaneous at bedtime  insulin lispro (HumaLOG) corrective regimen sliding scale   SubCutaneous Before meals and at bedtime  levothyroxine Injectable 40 MICROGram(s) IV Push at bedtime  lisinopril 40 milliGRAM(s) Oral daily  piperacillin/tazobactam IVPB.. 3.375 Gram(s) IV Intermittent every 6 hours  sodium chloride 0.9%. 1000 milliLiter(s) (100 mL/Hr) IV Continuous <Continuous>  vancomycin  IVPB 1500 milliGRAM(s) IV Intermittent every 12 hours  vancomycin  IVPB        MEDICATIONS  (PRN):  acetaminophen   Tablet .. 650 milliGRAM(s) Oral every 6 hours PRN Temp greater or equal to 38C (100.4F), Mild Pain (1 - 3)  dextrose 40% Gel 15 Gram(s) Oral once PRN Blood Glucose LESS THAN 70 milliGRAM(s)/deciliter  glucagon  Injectable 1 milliGRAM(s) IntraMuscular once PRN Glucose LESS THAN 70 milligrams/deciliter  hydrALAZINE Injectable 10 milliGRAM(s) IV Push every 4 hours PRN SBP >170  labetalol Injectable 20 milliGRAM(s) IV Push every 6 hours PRN 2nd line drug, Systolic blood pressure > 170  oxycodone    5 mG/acetaminophen 325 mG 1 Tablet(s) Oral every 6 hours PRN Severe Pain (7 - 10)  traZODone 50 milliGRAM(s) Oral at bedtime PRN insomnia      Weight:  Daily     Daily     Weight Change:     Nutrition Focused Physical Exam: Completed [   ]  Not Pertinent [   ]  Muscle Wasting- Temporal [   ]  Clavicle/Pectoral [   ]  Shoulder/Deltoid [   ]  Scapula [   ]  Interosseous [   ]  Quadriceps [   ]  Gastrocnemius [   ]  Fat Wasting- Orbital [   ]  Buccal [   ]  Triceps [   ]  Rib [   ]  Suspect [PCM] 2/2 to physical assessment, [poor intake], and [wt loss]; please see malnutrition chart note.    Estimated energy needs:     Subjective:     Previous Nutrition Diagnosis:    Active [   ]  Resolved [   ]    If resolved, new PES:     Goal:    Recommendations:    Education:     Risk Level: High [   ] Moderate [   ] Low [   ] Admitting Diagnosis:   Patient is a 60y old  Male who presents with a chief complaint of toxic megacolon (04 Apr 2020 14:38)    PAST MEDICAL & SURGICAL HISTORY:  Anemia  ETOH abuse  HLD (hyperlipidemia)  HTN (hypertension)  DM (diabetes mellitus)  No significant past surgical history    Current Nutrition Order: NPO except medications    PO Intake: Good (%) [   ]  Fair (50-75%) [   ] Poor (<25%) [   ]- N/A as pt NPO    GI Issues: +ileostomy ([id number])    Pain:    Skin Integrity: Kendell score 14    Labs:   04-10    129<L>  |  95<L>  |  8   ----------------------------<  213<H>  4.0   |  20<L>  |  0.74    Ca    8.0<L>      10 Apr 2020 06:10  Phos  2.6     04-10  Mg     2.1     04-10    CAPILLARY BLOOD GLUCOSE    POCT Blood Glucose.: 218 mg/dL (10 Apr 2020 07:06)  POCT Blood Glucose.: 177 mg/dL (09 Apr 2020 22:07)  POCT Blood Glucose.: 215 mg/dL (09 Apr 2020 17:28)  POCT Blood Glucose.: 175 mg/dL (09 Apr 2020 11:10)    Medications:  MEDICATIONS  (STANDING):  amLODIPine   Tablet 10 milliGRAM(s) Oral daily  aspirin enteric coated 81 milliGRAM(s) Oral daily  atorvastatin 10 milliGRAM(s) Oral at bedtime  dextrose 5%. 1000 milliLiter(s) (50 mL/Hr) IV Continuous <Continuous>  dextrose 50% Injectable 12.5 Gram(s) IV Push once  dextrose 50% Injectable 25 Gram(s) IV Push once  dextrose 50% Injectable 25 Gram(s) IV Push once  diphenoxylate/atropine 1 Tablet(s) Oral every 6 hours  gabapentin 100 milliGRAM(s) Oral every 6 hours  heparin  Injectable 5000 Unit(s) SubCutaneous every 8 hours  insulin glargine Injectable (LANTUS) 7 Unit(s) SubCutaneous at bedtime  insulin lispro (HumaLOG) corrective regimen sliding scale   SubCutaneous Before meals and at bedtime  levothyroxine Injectable 40 MICROGram(s) IV Push at bedtime  lisinopril 40 milliGRAM(s) Oral daily  piperacillin/tazobactam IVPB.. 3.375 Gram(s) IV Intermittent every 6 hours  sodium chloride 0.9%. 1000 milliLiter(s) (100 mL/Hr) IV Continuous <Continuous>  vancomycin  IVPB 1500 milliGRAM(s) IV Intermittent every 12 hours  vancomycin  IVPB        MEDICATIONS  (PRN):  acetaminophen   Tablet .. 650 milliGRAM(s) Oral every 6 hours PRN Temp greater or equal to 38C (100.4F), Mild Pain (1 - 3)  dextrose 40% Gel 15 Gram(s) Oral once PRN Blood Glucose LESS THAN 70 milliGRAM(s)/deciliter  glucagon  Injectable 1 milliGRAM(s) IntraMuscular once PRN Glucose LESS THAN 70 milligrams/deciliter  hydrALAZINE Injectable 10 milliGRAM(s) IV Push every 4 hours PRN SBP >170  labetalol Injectable 20 milliGRAM(s) IV Push every 6 hours PRN 2nd line drug, Systolic blood pressure > 170  oxycodone    5 mG/acetaminophen 325 mG 1 Tablet(s) Oral every 6 hours PRN Severe Pain (7 - 10)  traZODone 50 milliGRAM(s) Oral at bedtime PRN insomnia    Weight:  90.7kg (3/31)     Weight Change: No updated weights. Please obtain current weight and trend bi-weekly weights for further assessment.    Nutrition Focused Physical Exam: Completed [   ]  Not Pertinent [   ]-N/A 2/2 clinical status    Estimated energy needs:   Ht (3/31): 170.2cm, Wt (3/31): 90.7kg, IBW: 67.3kg +/-10%, %IBW: 134%, BMI: 31.3   IBW used to calculate energy needs due to pt's current body weight exceeding 100% of IBW in ICU setting. Needs adjusted for post-op. Aim for higher end of kcal/protein range.    0266-6322 kcal (25-30 kcal/kg)  81-94gm protein (1.2-1.4gm protein/kg)  Fluid needs per team    Subjective: 61 y/o M with PMH of HTN, DM complicated by neuropathy, EtOH liver cirrhosis, s/p subtotal colectomy and end ileostomy on 3/31 for toxic megacolon, Pt successfully extubated on 3/31. Please see below for full nutritional recommendations- d/w team. RD to monitor and f/u per protocol.    Previous Nutrition Diagnosis:  Increased Nutrient Needs (protein) RT increased demand for nutrient AEB post-op, vented.   Active [   ]  Resolved [   ]-N/A    PES: Increased Nutrient Needs (protein) RT increased nutrient demand AEB post-op  Active [ X ]  Resolved [   ]    Goal: Meet >75%EER via most appropriate route with good tolerance within 24-48h    Recommendations:  1. Recommend continue CSTCHO, CLD as tolerated  2. Recommend advance towards CSTCHO, DASH/TLC, low fiber as medically appropriate   >>monitor need for ONS as diet advances  3. Monitor labs closely (BM, lytes, [id number])  4. Obtain current weight and trend bi-weekly weights for further assessment    Education: N/A 2/2 clinical status, RD to f/u with diet education per protocol    Risk Level: High [ X ]  Moderate [   ] Low [   ] Admitting Diagnosis:   Patient is a 60y old  Male who presents with a chief complaint of toxic megacolon (04 Apr 2020 14:38)    PAST MEDICAL & SURGICAL HISTORY:  Anemia  ETOH abuse  HLD (hyperlipidemia)  HTN (hypertension)  DM (diabetes mellitus)  No significant past surgical history    Current Nutrition Order: NPO except medications    PO Intake: Good (%) [   ]  Fair (50-75%) [   ] Poor (<25%) [   ]- N/A as pt NPO    GI Issues: +ileostomy ([id number]) ordered for Lomotil, +nausea but no vomiting (no anti-emetic ordered)    Pain: endorses pain, weakness    Skin Integrity: Kendell score 14    Labs:   04-10    129<L>  |  95<L>  |  8   ----------------------------<  213<H>  4.0   |  20<L>  |  0.74    Ca    8.0<L>      10 Apr 2020 06:10  Phos  2.6     04-10  Mg     2.1     04-10    CAPILLARY BLOOD GLUCOSE    POCT Blood Glucose.: 218 mg/dL (10 Apr 2020 07:06)  POCT Blood Glucose.: 177 mg/dL (09 Apr 2020 22:07)  POCT Blood Glucose.: 215 mg/dL (09 Apr 2020 17:28)  POCT Blood Glucose.: 175 mg/dL (09 Apr 2020 11:10)    Medications:  MEDICATIONS  (STANDING):  amLODIPine   Tablet 10 milliGRAM(s) Oral daily  aspirin enteric coated 81 milliGRAM(s) Oral daily  atorvastatin 10 milliGRAM(s) Oral at bedtime  dextrose 5%. 1000 milliLiter(s) (50 mL/Hr) IV Continuous <Continuous>  dextrose 50% Injectable 12.5 Gram(s) IV Push once  dextrose 50% Injectable 25 Gram(s) IV Push once  dextrose 50% Injectable 25 Gram(s) IV Push once  diphenoxylate/atropine 1 Tablet(s) Oral every 6 hours  gabapentin 100 milliGRAM(s) Oral every 6 hours  heparin  Injectable 5000 Unit(s) SubCutaneous every 8 hours  insulin glargine Injectable (LANTUS) 7 Unit(s) SubCutaneous at bedtime  insulin lispro (HumaLOG) corrective regimen sliding scale   SubCutaneous Before meals and at bedtime  levothyroxine Injectable 40 MICROGram(s) IV Push at bedtime  lisinopril 40 milliGRAM(s) Oral daily  piperacillin/tazobactam IVPB.. 3.375 Gram(s) IV Intermittent every 6 hours  sodium chloride 0.9%. 1000 milliLiter(s) (100 mL/Hr) IV Continuous <Continuous>  vancomycin  IVPB 1500 milliGRAM(s) IV Intermittent every 12 hours  vancomycin  IVPB        MEDICATIONS  (PRN):  acetaminophen   Tablet .. 650 milliGRAM(s) Oral every 6 hours PRN Temp greater or equal to 38C (100.4F), Mild Pain (1 - 3)  dextrose 40% Gel 15 Gram(s) Oral once PRN Blood Glucose LESS THAN 70 milliGRAM(s)/deciliter  glucagon  Injectable 1 milliGRAM(s) IntraMuscular once PRN Glucose LESS THAN 70 milligrams/deciliter  hydrALAZINE Injectable 10 milliGRAM(s) IV Push every 4 hours PRN SBP >170  labetalol Injectable 20 milliGRAM(s) IV Push every 6 hours PRN 2nd line drug, Systolic blood pressure > 170  oxycodone    5 mG/acetaminophen 325 mG 1 Tablet(s) Oral every 6 hours PRN Severe Pain (7 - 10)  traZODone 50 milliGRAM(s) Oral at bedtime PRN insomnia    Weight:  90.7kg (3/31)     Weight Change: No updated weights. Please obtain current weight and trend bi-weekly weights for further assessment.    Nutrition Focused Physical Exam: Completed [   ]  Not Pertinent [   ]-N/A 2/2 clinical status    Estimated energy needs:   Ht (3/31): 170.2cm, Wt (3/31): 90.7kg, IBW: 67.3kg +/-10%, %IBW: 134%, BMI: 31.3   IBW used to calculate energy needs due to pt's current body weight exceeding 100% of IBW in ICU setting. Needs adjusted for post-op. Aim for higher end of kcal/protein range.    9374-4674 kcal (25-30 kcal/kg)  81-94gm protein (1.2-1.4gm protein/kg)  Fluid needs per team    Subjective: 61 y/o M with PMH of HTN, DM complicated by neuropathy, EtOH liver cirrhosis, s/p subtotal colectomy and end ileostomy on 3/31 for toxic megacolon, Pt successfully extubated on 3/31. Spoke with pt via phone. Pt states appetite good PTA, lives at a shelter which provides pt with meals. Pt limits bread, denies other dietary restrictions, denies food allergies. Pt states he weighed 175-180lb ~6 months ago and now reports weighing around 200lb, indicating a 20-25lb weight gain, pt states he has been walking less. Pt states he was consuming jello, soup and other liquids yesterday. Pt now NPO for CT A/P to evaluate wound. Diet advancement pending CT results. Discussed diet advancement with pt pending CT results and clinical progress. Discussed low fiber diet with pt, pt appeared receptive to recommendations/education. Please see below for full nutritional recommendations- d/w team. RD to monitor and f/u per protocol.    Previous Nutrition Diagnosis:  Increased Nutrient Needs (protein) RT increased demand for nutrient AEB post-op, vented.   Active [   ]  Resolved [   ]-N/A    PES: Increased Nutrient Needs (protein) RT increased nutrient demand AEB post-op  Active [ X ]  Resolved [   ]    Goal: Meet >75%EER via most appropriate route with good tolerance within 24-48h    Recommendations:  1. Recommend diet advancement as medically appropriate: CSTCHO, CLD -> CSTCHO, DASH/TLC, low fiber  >>monitor need for ONS as diet advances  2. Monitor labs closely (BM, lytes, [id number])  3. Obtain current weight and trend bi-weekly weights for further assessment  4. Recommend add anti-emetic if nausea continues    Education: Discussed diet advancement with pt pending CT results and clinical progress, low fiber diet education, reviewed CSTCHO diet- pt appeared receptive to recommendations/education    Risk Level: High [ X ]  Moderate [   ] Low [   ]

## 2020-04-10 NOTE — ADVANCED PRACTICE NURSE CONSULT - ASSESSMENT
New 1 3/4" [employer] 2 piece appliance placed over ileostomy, applied a ring beneath appliance because stoma almost flush to abdomen. Stoma with some non-viable tissue over it, unable to assess color of stoma but producing dark brown effluent. No teaching done at this time, pt states he isn't strong enough to learn.

## 2020-04-10 NOTE — PROGRESS NOTE ADULT - ASSESSMENT
59 y/o M with PMH of HTN, DM complicated by neuropathy, EtOH liver cirrhosis, presenting w/ pneumatosis admitted to SICU for hemodynamic monitoring. Now s/p sub total colectomy and end ileostomy on 3/31, noted to have purulence from wound with fever on 4/9.     - CT A/P to evaluate wound  - F/u wound and blood cultures  - Pain/nausea control  - Lisinopril, amlodipine, prn labetalol/hydralazine  - OOB/IS  - FLD, +ostomy fxn  - Monitor UOP  - ISS, synthroid  - Verify home meds  - PT reassessment

## 2020-04-10 NOTE — PROGRESS NOTE ADULT - SUBJECTIVE AND OBJECTIVE BOX
Interval History: temp 100.5 and tachy to 100's. purulence noted in vac canister. vac taken down, wound and blood cxs sent, soupy purulence noted in wound. WTD dressing placed. repeat temp 102. given iv tyl. started vanc and zosyn. made NPO/IVF. 500cc bolus LR    SUBJECTIVE: Patient seen and examined bedside by chief resident. Has had some nausea but no vomiting. Pain controlled. Feels better than last night      amLODIPine   Tablet 10 milliGRAM(s) Oral daily  aspirin enteric coated 81 milliGRAM(s) Oral daily  heparin  Injectable 5000 Unit(s) SubCutaneous every 8 hours  hydrALAZINE Injectable 10 milliGRAM(s) IV Push every 4 hours PRN  labetalol Injectable 20 milliGRAM(s) IV Push every 6 hours PRN  lisinopril 40 milliGRAM(s) Oral daily  piperacillin/tazobactam IVPB.. 3.375 Gram(s) IV Intermittent every 6 hours  vancomycin  IVPB 1500 milliGRAM(s) IV Intermittent every 12 hours  vancomycin  IVPB        MEDICATIONS  (PRN):  acetaminophen   Tablet .. 650 milliGRAM(s) Oral every 6 hours PRN Temp greater or equal to 38C (100.4F), Mild Pain (1 - 3)  dextrose 40% Gel 15 Gram(s) Oral once PRN Blood Glucose LESS THAN 70 milliGRAM(s)/deciliter  glucagon  Injectable 1 milliGRAM(s) IntraMuscular once PRN Glucose LESS THAN 70 milligrams/deciliter  hydrALAZINE Injectable 10 milliGRAM(s) IV Push every 4 hours PRN SBP >170  labetalol Injectable 20 milliGRAM(s) IV Push every 6 hours PRN 2nd line drug, Systolic blood pressure > 170  oxycodone    5 mG/acetaminophen 325 mG 1 Tablet(s) Oral every 6 hours PRN Severe Pain (7 - 10)  traZODone 50 milliGRAM(s) Oral at bedtime PRN insomnia      I&O's Detail    09 Apr 2020 07:01  -  10 Apr 2020 07:00  --------------------------------------------------------  IN:    Oral Fluid: 705 mL    sodium chloride 0.9%.: 800 mL    Solution: 300 mL    Solution: 500 mL    Solution: 500 mL    Solution: 200 mL  Total IN: 3005 mL    OUT:    Ileostomy: 1020 mL    VAC (Vacuum Assisted Closure) System: 135 mL    Voided: 1050 mL  Total OUT: 2205 mL    Total NET: 800 mL      10 Apr 2020 07:01  -  10 Apr 2020 11:39  --------------------------------------------------------  IN:    sodium chloride 0.9%.: 100 mL  Total IN: 100 mL    OUT:    Voided: 250 mL  Total OUT: 250 mL    Total NET: -150 mL          Vital Signs Last 24 Hrs  T(C): 37.3 (10 Apr 2020 09:00), Max: 39.2 (09 Apr 2020 22:15)  T(F): 99.1 (10 Apr 2020 09:00), Max: 102.6 (09 Apr 2020 22:15)  HR: 83 (10 Apr 2020 09:00) (77 - 106)  BP: 149/82 (10 Apr 2020 09:00) (108/57 - 173/74)  BP(mean): 87 (10 Apr 2020 08:00) (79 - 107)  RR: 24 (10 Apr 2020 09:00) (16 - 28)  SpO2: 99% (10 Apr 2020 09:00) (96% - 100%)    General: NAD, resting comfortably in bed  C/V: NSR  Pulm: Nonlabored breathing, no respiratory distress  Abd: soft, mildly distended, nontender, wound vac to suction, stoma slightly dusky but pink, ostomy w/ minimal output/new bag  Extrem: SCDs in place    LABS:                        9.6    10.89 )-----------( 336      ( 10 Apr 2020 06:10 )             29.4     04-10    129<L>  |  95<L>  |  8   ----------------------------<  213<H>  4.0   |  20<L>  |  0.74    Ca    8.0<L>      10 Apr 2020 06:10  Phos  2.6     04-10  Mg     2.1     04-10            RADIOLOGY & ADDITIONAL STUDIES:      Culture - Blood (collected 04-09-20 @ 22:19)  Source: .Blood Blood-Venous  Preliminary Report (04-10-20 @ 11:01):    No growth at 12 hours    Culture - Surgical Swab (collected 04-09-20 @ 22:17)  Source: .Surgical Swab Abdominal wound vac site  Gram Stain (04-09-20 @ 22:39):    Rare Gram positive cocci in pairs    Moderate WBC's  Preliminary Report (04-10-20 @ 09:25):    Culture in progress

## 2020-04-11 LAB
-  CEFAZOLIN: SIGNIFICANT CHANGE UP
-  CLINDAMYCIN: SIGNIFICANT CHANGE UP
-  ERYTHROMYCIN: SIGNIFICANT CHANGE UP
-  LINEZOLID: SIGNIFICANT CHANGE UP
-  OXACILLIN: SIGNIFICANT CHANGE UP
-  RIFAMPIN: SIGNIFICANT CHANGE UP
-  TRIMETHOPRIM/SULFAMETHOXAZOLE: SIGNIFICANT CHANGE UP
-  VANCOMYCIN: SIGNIFICANT CHANGE UP
ANION GAP SERPL CALC-SCNC: 10 MMOL/L — SIGNIFICANT CHANGE UP (ref 5–17)
BUN SERPL-MCNC: 6 MG/DL — LOW (ref 7–23)
CALCIUM SERPL-MCNC: 7.3 MG/DL — LOW (ref 8.4–10.5)
CHLORIDE SERPL-SCNC: 97 MMOL/L — SIGNIFICANT CHANGE UP (ref 96–108)
CO2 SERPL-SCNC: 22 MMOL/L — SIGNIFICANT CHANGE UP (ref 22–31)
CREAT SERPL-MCNC: 0.84 MG/DL — SIGNIFICANT CHANGE UP (ref 0.5–1.3)
GLUCOSE BLDC GLUCOMTR-MCNC: 239 MG/DL — HIGH (ref 70–99)
GLUCOSE BLDC GLUCOMTR-MCNC: 248 MG/DL — HIGH (ref 70–99)
GLUCOSE BLDC GLUCOMTR-MCNC: 272 MG/DL — HIGH (ref 70–99)
GLUCOSE BLDC GLUCOMTR-MCNC: 330 MG/DL — HIGH (ref 70–99)
GLUCOSE SERPL-MCNC: 263 MG/DL — HIGH (ref 70–99)
HCT VFR BLD CALC: 22.9 % — LOW (ref 39–50)
HGB BLD-MCNC: 7.5 G/DL — LOW (ref 13–17)
MAGNESIUM SERPL-MCNC: 1.9 MG/DL — SIGNIFICANT CHANGE UP (ref 1.6–2.6)
MCHC RBC-ENTMCNC: 28.8 PG — SIGNIFICANT CHANGE UP (ref 27–34)
MCHC RBC-ENTMCNC: 32.8 GM/DL — SIGNIFICANT CHANGE UP (ref 32–36)
MCV RBC AUTO: 88.1 FL — SIGNIFICANT CHANGE UP (ref 80–100)
METHOD TYPE: SIGNIFICANT CHANGE UP
NRBC # BLD: 0 /100 WBCS — SIGNIFICANT CHANGE UP (ref 0–0)
PHOSPHATE SERPL-MCNC: 2.3 MG/DL — LOW (ref 2.5–4.5)
PLATELET # BLD AUTO: 342 K/UL — SIGNIFICANT CHANGE UP (ref 150–400)
POTASSIUM SERPL-MCNC: 3.6 MMOL/L — SIGNIFICANT CHANGE UP (ref 3.5–5.3)
POTASSIUM SERPL-SCNC: 3.6 MMOL/L — SIGNIFICANT CHANGE UP (ref 3.5–5.3)
RBC # BLD: 2.6 M/UL — LOW (ref 4.2–5.8)
RBC # FLD: 13.6 % — SIGNIFICANT CHANGE UP (ref 10.3–14.5)
SODIUM SERPL-SCNC: 129 MMOL/L — LOW (ref 135–145)
WBC # BLD: 7.65 K/UL — SIGNIFICANT CHANGE UP (ref 3.8–10.5)
WBC # FLD AUTO: 7.65 K/UL — SIGNIFICANT CHANGE UP (ref 3.8–10.5)

## 2020-04-11 RX ORDER — ONDANSETRON 8 MG/1
4 TABLET, FILM COATED ORAL ONCE
Refills: 0 | Status: COMPLETED | OUTPATIENT
Start: 2020-04-11 | End: 2020-04-11

## 2020-04-11 RX ORDER — POTASSIUM PHOSPHATE, MONOBASIC POTASSIUM PHOSPHATE, DIBASIC 236; 224 MG/ML; MG/ML
15 INJECTION, SOLUTION INTRAVENOUS ONCE
Refills: 0 | Status: COMPLETED | OUTPATIENT
Start: 2020-04-11 | End: 2020-04-11

## 2020-04-11 RX ADMIN — Medication 4: at 10:55

## 2020-04-11 RX ADMIN — Medication 40 MICROGRAM(S): at 23:33

## 2020-04-11 RX ADMIN — Medication 300 MILLIGRAM(S): at 11:58

## 2020-04-11 RX ADMIN — PIPERACILLIN AND TAZOBACTAM 200 GRAM(S): 4; .5 INJECTION, POWDER, LYOPHILIZED, FOR SOLUTION INTRAVENOUS at 11:03

## 2020-04-11 RX ADMIN — ONDANSETRON 4 MILLIGRAM(S): 8 TABLET, FILM COATED ORAL at 21:27

## 2020-04-11 RX ADMIN — PIPERACILLIN AND TAZOBACTAM 200 GRAM(S): 4; .5 INJECTION, POWDER, LYOPHILIZED, FOR SOLUTION INTRAVENOUS at 23:33

## 2020-04-11 RX ADMIN — Medication 50 MILLIGRAM(S): at 23:37

## 2020-04-11 RX ADMIN — GABAPENTIN 100 MILLIGRAM(S): 400 CAPSULE ORAL at 09:43

## 2020-04-11 RX ADMIN — ATORVASTATIN CALCIUM 10 MILLIGRAM(S): 80 TABLET, FILM COATED ORAL at 23:36

## 2020-04-11 RX ADMIN — PIPERACILLIN AND TAZOBACTAM 200 GRAM(S): 4; .5 INJECTION, POWDER, LYOPHILIZED, FOR SOLUTION INTRAVENOUS at 05:33

## 2020-04-11 RX ADMIN — INSULIN GLARGINE 7 UNIT(S): 100 INJECTION, SOLUTION SUBCUTANEOUS at 23:34

## 2020-04-11 RX ADMIN — Medication 81 MILLIGRAM(S): at 09:43

## 2020-04-11 RX ADMIN — LISINOPRIL 40 MILLIGRAM(S): 2.5 TABLET ORAL at 05:32

## 2020-04-11 RX ADMIN — HEPARIN SODIUM 5000 UNIT(S): 5000 INJECTION INTRAVENOUS; SUBCUTANEOUS at 05:33

## 2020-04-11 RX ADMIN — HEPARIN SODIUM 5000 UNIT(S): 5000 INJECTION INTRAVENOUS; SUBCUTANEOUS at 13:45

## 2020-04-11 RX ADMIN — PIPERACILLIN AND TAZOBACTAM 200 GRAM(S): 4; .5 INJECTION, POWDER, LYOPHILIZED, FOR SOLUTION INTRAVENOUS at 17:13

## 2020-04-11 RX ADMIN — HEPARIN SODIUM 5000 UNIT(S): 5000 INJECTION INTRAVENOUS; SUBCUTANEOUS at 21:25

## 2020-04-11 RX ADMIN — GABAPENTIN 100 MILLIGRAM(S): 400 CAPSULE ORAL at 23:34

## 2020-04-11 RX ADMIN — Medication 6: at 23:35

## 2020-04-11 RX ADMIN — ONDANSETRON 4 MILLIGRAM(S): 8 TABLET, FILM COATED ORAL at 15:47

## 2020-04-11 RX ADMIN — Medication 4: at 07:53

## 2020-04-11 RX ADMIN — AMLODIPINE BESYLATE 10 MILLIGRAM(S): 2.5 TABLET ORAL at 05:54

## 2020-04-11 RX ADMIN — Medication 8: at 16:03

## 2020-04-11 RX ADMIN — GABAPENTIN 100 MILLIGRAM(S): 400 CAPSULE ORAL at 05:32

## 2020-04-11 RX ADMIN — GABAPENTIN 100 MILLIGRAM(S): 400 CAPSULE ORAL at 17:14

## 2020-04-11 RX ADMIN — POTASSIUM PHOSPHATE, MONOBASIC POTASSIUM PHOSPHATE, DIBASIC 63.75 MILLIMOLE(S): 236; 224 INJECTION, SOLUTION INTRAVENOUS at 09:43

## 2020-04-11 NOTE — PACU DISCHARGE NOTE - COMMENTS
pt is awake alert oriented x4, assisted ot oob to chair x2, pt had breakfast and lunch and tolerated well. pt has right lower quadrat mid abd open and with abdominal dressing dry intact pt is able to void freely. gave report to Chung Motta pt going to room 237 via bed. all meds taken

## 2020-04-11 NOTE — PROGRESS NOTE ADULT - SUBJECTIVE AND OBJECTIVE BOX
Patient was seen and examined at bedside. No acute event overnight. No complaints. Denies n/v, abdominal or chest pain.       Vital Signs Last 24 Hrs  T(C): 36.8 (11 Apr 2020 07:00), Max: 37.3 (10 Apr 2020 09:00)  T(F): 98.2 (11 Apr 2020 07:00), Max: 99.1 (10 Apr 2020 09:00)  HR: 97 (11 Apr 2020 07:33) (83 - 103)  BP: 146/65 (11 Apr 2020 07:33) (129/60 - 170/72)  BP(mean): 94 (11 Apr 2020 07:33) (84 - 103)  RR: 19 (11 Apr 2020 07:33) (18 - 27)  SpO2: 98% (11 Apr 2020 07:33) (95% - 100%)    Physical Exam:  General: NAD  Pulmonary: Nonlabored breathing, no respiratory distress  Cardiovascular: NSR  Abdominal: soft, NT/ND, wound dressing changed. Dressing soaked in serous fluid. No signs of ongoing infection or purulent drainage.   Extremities: WWP, normal strength, no clubbing/cyanosis/edema  Neuro: A/O x3, no focal deficits, normal sensation  Pulses: palpable distal pulses    Lines/drains/tubes:    I&O's Summary    10 Apr 2020 07:01  -  11 Apr 2020 07:00  --------------------------------------------------------  IN: 2118 mL / OUT: 3175 mL / NET: -1057 mL    11 Apr 2020 07:01  -  11 Apr 2020 08:44  --------------------------------------------------------  IN: 100 mL / OUT: 0 mL / NET: 100 mL        LABS:                        7.5    7.65  )-----------( 342      ( 11 Apr 2020 06:45 )             22.9     04-11    129<L>  |  97  |  6<L>  ----------------------------<  263<H>  3.6   |  22  |  0.84    Ca    7.3<L>      11 Apr 2020 06:45  Phos  2.3     04-11  Mg     1.9     04-11          CAPILLARY BLOOD GLUCOSE      POCT Blood Glucose.: 248 mg/dL (11 Apr 2020 07:51)  POCT Blood Glucose.: 210 mg/dL (10 Apr 2020 21:49)  POCT Blood Glucose.: 182 mg/dL (10 Apr 2020 16:00)  POCT Blood Glucose.: 206 mg/dL (10 Apr 2020 10:23)        RADIOLOGY & ADDITIONAL STUDIES:

## 2020-04-12 LAB
ANION GAP SERPL CALC-SCNC: 12 MMOL/L — SIGNIFICANT CHANGE UP (ref 5–17)
APPEARANCE UR: CLEAR — SIGNIFICANT CHANGE UP
BACTERIA # UR AUTO: PRESENT /HPF
BILIRUB UR-MCNC: NEGATIVE — SIGNIFICANT CHANGE UP
BUN SERPL-MCNC: 5 MG/DL — LOW (ref 7–23)
CALCIUM SERPL-MCNC: 7.4 MG/DL — LOW (ref 8.4–10.5)
CHLORIDE SERPL-SCNC: 101 MMOL/L — SIGNIFICANT CHANGE UP (ref 96–108)
CO2 SERPL-SCNC: 20 MMOL/L — LOW (ref 22–31)
COLOR SPEC: YELLOW — SIGNIFICANT CHANGE UP
CREAT SERPL-MCNC: 0.89 MG/DL — SIGNIFICANT CHANGE UP (ref 0.5–1.3)
DIFF PNL FLD: ABNORMAL
EPI CELLS # UR: SIGNIFICANT CHANGE UP /HPF (ref 0–5)
GLUCOSE BLDC GLUCOMTR-MCNC: 122 MG/DL — HIGH (ref 70–99)
GLUCOSE BLDC GLUCOMTR-MCNC: 241 MG/DL — HIGH (ref 70–99)
GLUCOSE BLDC GLUCOMTR-MCNC: 414 MG/DL — HIGH (ref 70–99)
GLUCOSE BLDC GLUCOMTR-MCNC: 454 MG/DL — CRITICAL HIGH (ref 70–99)
GLUCOSE BLDC GLUCOMTR-MCNC: 484 MG/DL — CRITICAL HIGH (ref 70–99)
GLUCOSE BLDC GLUCOMTR-MCNC: 509 MG/DL — CRITICAL HIGH (ref 70–99)
GLUCOSE SERPL-MCNC: 223 MG/DL — HIGH (ref 70–99)
GLUCOSE UR QL: 100
HCT VFR BLD CALC: 22 % — LOW (ref 39–50)
HGB BLD-MCNC: 7.1 G/DL — LOW (ref 13–17)
KETONES UR-MCNC: NEGATIVE — SIGNIFICANT CHANGE UP
LEUKOCYTE ESTERASE UR-ACNC: NEGATIVE — SIGNIFICANT CHANGE UP
MAGNESIUM SERPL-MCNC: 1.9 MG/DL — SIGNIFICANT CHANGE UP (ref 1.6–2.6)
MCHC RBC-ENTMCNC: 29.1 PG — SIGNIFICANT CHANGE UP (ref 27–34)
MCHC RBC-ENTMCNC: 32.3 GM/DL — SIGNIFICANT CHANGE UP (ref 32–36)
MCV RBC AUTO: 90.2 FL — SIGNIFICANT CHANGE UP (ref 80–100)
NITRITE UR-MCNC: NEGATIVE — SIGNIFICANT CHANGE UP
NRBC # BLD: 0 /100 WBCS — SIGNIFICANT CHANGE UP (ref 0–0)
PH UR: 6 — SIGNIFICANT CHANGE UP (ref 5–8)
PHOSPHATE SERPL-MCNC: 2.3 MG/DL — LOW (ref 2.5–4.5)
PLATELET # BLD AUTO: 361 K/UL — SIGNIFICANT CHANGE UP (ref 150–400)
POTASSIUM SERPL-MCNC: 3.2 MMOL/L — LOW (ref 3.5–5.3)
POTASSIUM SERPL-SCNC: 3.2 MMOL/L — LOW (ref 3.5–5.3)
PROT UR-MCNC: ABNORMAL MG/DL
RBC # BLD: 2.44 M/UL — LOW (ref 4.2–5.8)
RBC # FLD: 13.7 % — SIGNIFICANT CHANGE UP (ref 10.3–14.5)
RBC CASTS # UR COMP ASSIST: < 5 /HPF — SIGNIFICANT CHANGE UP
SODIUM SERPL-SCNC: 133 MMOL/L — LOW (ref 135–145)
SP GR SPEC: 1.01 — SIGNIFICANT CHANGE UP (ref 1–1.03)
UROBILINOGEN FLD QL: 0.2 E.U./DL — SIGNIFICANT CHANGE UP
WBC # BLD: 6.7 K/UL — SIGNIFICANT CHANGE UP (ref 3.8–10.5)
WBC # FLD AUTO: 6.7 K/UL — SIGNIFICANT CHANGE UP (ref 3.8–10.5)
WBC UR QL: < 5 /HPF — SIGNIFICANT CHANGE UP

## 2020-04-12 PROCEDURE — 71045 X-RAY EXAM CHEST 1 VIEW: CPT | Mod: 26

## 2020-04-12 RX ORDER — ALBUMIN HUMAN 25 %
100 VIAL (ML) INTRAVENOUS ONCE
Refills: 0 | Status: DISCONTINUED | OUTPATIENT
Start: 2020-04-12 | End: 2020-04-12

## 2020-04-12 RX ORDER — POTASSIUM CHLORIDE 20 MEQ
40 PACKET (EA) ORAL ONCE
Refills: 0 | Status: COMPLETED | OUTPATIENT
Start: 2020-04-12 | End: 2020-04-12

## 2020-04-12 RX ORDER — INSULIN HUMAN 100 [IU]/ML
20 INJECTION, SOLUTION SUBCUTANEOUS ONCE
Refills: 0 | Status: COMPLETED | OUTPATIENT
Start: 2020-04-12 | End: 2020-04-12

## 2020-04-12 RX ORDER — LOPERAMIDE HCL 2 MG
2 TABLET ORAL DAILY
Refills: 0 | Status: DISCONTINUED | OUTPATIENT
Start: 2020-04-12 | End: 2020-04-12

## 2020-04-12 RX ORDER — LOPERAMIDE HCL 2 MG
2 TABLET ORAL EVERY 6 HOURS
Refills: 0 | Status: DISCONTINUED | OUTPATIENT
Start: 2020-04-12 | End: 2020-04-15

## 2020-04-12 RX ORDER — ALBUMIN HUMAN 25 %
100 VIAL (ML) INTRAVENOUS EVERY 8 HOURS
Refills: 0 | Status: COMPLETED | OUTPATIENT
Start: 2020-04-12 | End: 2020-04-13

## 2020-04-12 RX ORDER — FUROSEMIDE 40 MG
20 TABLET ORAL ONCE
Refills: 0 | Status: COMPLETED | OUTPATIENT
Start: 2020-04-12 | End: 2020-04-12

## 2020-04-12 RX ORDER — SPIRONOLACTONE 25 MG/1
100 TABLET, FILM COATED ORAL ONCE
Refills: 0 | Status: COMPLETED | OUTPATIENT
Start: 2020-04-12 | End: 2020-04-12

## 2020-04-12 RX ORDER — MAGNESIUM SULFATE 500 MG/ML
1 VIAL (ML) INJECTION ONCE
Refills: 0 | Status: COMPLETED | OUTPATIENT
Start: 2020-04-12 | End: 2020-04-12

## 2020-04-12 RX ORDER — INSULIN LISPRO 100/ML
12 VIAL (ML) SUBCUTANEOUS ONCE
Refills: 0 | Status: COMPLETED | OUTPATIENT
Start: 2020-04-12 | End: 2020-04-12

## 2020-04-12 RX ORDER — INSULIN GLARGINE 100 [IU]/ML
15 INJECTION, SOLUTION SUBCUTANEOUS AT BEDTIME
Refills: 0 | Status: DISCONTINUED | OUTPATIENT
Start: 2020-04-12 | End: 2020-04-12

## 2020-04-12 RX ORDER — INSULIN GLARGINE 100 [IU]/ML
10 INJECTION, SOLUTION SUBCUTANEOUS AT BEDTIME
Refills: 0 | Status: DISCONTINUED | OUTPATIENT
Start: 2020-04-12 | End: 2020-04-14

## 2020-04-12 RX ORDER — INSULIN LISPRO 100/ML
4 VIAL (ML) SUBCUTANEOUS
Refills: 0 | Status: DISCONTINUED | OUTPATIENT
Start: 2020-04-12 | End: 2020-04-14

## 2020-04-12 RX ADMIN — Medication 4 UNIT(S): at 06:44

## 2020-04-12 RX ADMIN — LISINOPRIL 40 MILLIGRAM(S): 2.5 TABLET ORAL at 06:21

## 2020-04-12 RX ADMIN — Medication 40 MICROGRAM(S): at 23:16

## 2020-04-12 RX ADMIN — Medication 4: at 06:43

## 2020-04-12 RX ADMIN — Medication 40 MILLIEQUIVALENT(S): at 14:06

## 2020-04-12 RX ADMIN — GABAPENTIN 100 MILLIGRAM(S): 400 CAPSULE ORAL at 06:21

## 2020-04-12 RX ADMIN — AMLODIPINE BESYLATE 10 MILLIGRAM(S): 2.5 TABLET ORAL at 06:21

## 2020-04-12 RX ADMIN — Medication 300 MILLIGRAM(S): at 14:05

## 2020-04-12 RX ADMIN — SODIUM CHLORIDE 100 MILLILITER(S): 9 INJECTION INTRAMUSCULAR; INTRAVENOUS; SUBCUTANEOUS at 11:07

## 2020-04-12 RX ADMIN — ATORVASTATIN CALCIUM 10 MILLIGRAM(S): 80 TABLET, FILM COATED ORAL at 22:56

## 2020-04-12 RX ADMIN — Medication 50 MILLIGRAM(S): at 22:54

## 2020-04-12 RX ADMIN — Medication 20 MILLIGRAM(S): at 16:08

## 2020-04-12 RX ADMIN — Medication 50 MILLILITER(S): at 16:57

## 2020-04-12 RX ADMIN — Medication 650 MILLIGRAM(S): at 01:02

## 2020-04-12 RX ADMIN — Medication 4 UNIT(S): at 16:57

## 2020-04-12 RX ADMIN — PIPERACILLIN AND TAZOBACTAM 200 GRAM(S): 4; .5 INJECTION, POWDER, LYOPHILIZED, FOR SOLUTION INTRAVENOUS at 22:55

## 2020-04-12 RX ADMIN — Medication 12: at 16:57

## 2020-04-12 RX ADMIN — PIPERACILLIN AND TAZOBACTAM 200 GRAM(S): 4; .5 INJECTION, POWDER, LYOPHILIZED, FOR SOLUTION INTRAVENOUS at 11:08

## 2020-04-12 RX ADMIN — GABAPENTIN 100 MILLIGRAM(S): 400 CAPSULE ORAL at 16:17

## 2020-04-12 RX ADMIN — OXYCODONE AND ACETAMINOPHEN 1 TABLET(S): 5; 325 TABLET ORAL at 12:41

## 2020-04-12 RX ADMIN — INSULIN GLARGINE 10 UNIT(S): 100 INJECTION, SOLUTION SUBCUTANEOUS at 23:17

## 2020-04-12 RX ADMIN — Medication 650 MILLIGRAM(S): at 22:55

## 2020-04-12 RX ADMIN — GABAPENTIN 100 MILLIGRAM(S): 400 CAPSULE ORAL at 11:08

## 2020-04-12 RX ADMIN — PIPERACILLIN AND TAZOBACTAM 200 GRAM(S): 4; .5 INJECTION, POWDER, LYOPHILIZED, FOR SOLUTION INTRAVENOUS at 06:23

## 2020-04-12 RX ADMIN — Medication 300 MILLIGRAM(S): at 23:17

## 2020-04-12 RX ADMIN — Medication 650 MILLIGRAM(S): at 16:06

## 2020-04-12 RX ADMIN — PIPERACILLIN AND TAZOBACTAM 200 GRAM(S): 4; .5 INJECTION, POWDER, LYOPHILIZED, FOR SOLUTION INTRAVENOUS at 18:22

## 2020-04-12 RX ADMIN — HEPARIN SODIUM 5000 UNIT(S): 5000 INJECTION INTRAVENOUS; SUBCUTANEOUS at 14:07

## 2020-04-12 RX ADMIN — Medication 50 MILLILITER(S): at 23:14

## 2020-04-12 RX ADMIN — GABAPENTIN 100 MILLIGRAM(S): 400 CAPSULE ORAL at 22:56

## 2020-04-12 RX ADMIN — HEPARIN SODIUM 5000 UNIT(S): 5000 INJECTION INTRAVENOUS; SUBCUTANEOUS at 22:55

## 2020-04-12 RX ADMIN — INSULIN HUMAN 20 UNIT(S): 100 INJECTION, SOLUTION SUBCUTANEOUS at 19:05

## 2020-04-12 RX ADMIN — HEPARIN SODIUM 5000 UNIT(S): 5000 INJECTION INTRAVENOUS; SUBCUTANEOUS at 06:21

## 2020-04-12 RX ADMIN — Medication 81 MILLIGRAM(S): at 11:08

## 2020-04-12 RX ADMIN — Medication 300 MILLIGRAM(S): at 00:29

## 2020-04-12 RX ADMIN — Medication 40 MILLIEQUIVALENT(S): at 16:08

## 2020-04-12 RX ADMIN — Medication 100 GRAM(S): at 11:07

## 2020-04-12 RX ADMIN — SPIRONOLACTONE 100 MILLIGRAM(S): 25 TABLET, FILM COATED ORAL at 16:09

## 2020-04-12 RX ADMIN — Medication 12 UNIT(S): at 19:05

## 2020-04-12 RX ADMIN — Medication 62.5 MILLIMOLE(S): at 11:07

## 2020-04-12 RX ADMIN — Medication 2 MILLIGRAM(S): at 23:14

## 2020-04-12 NOTE — PROGRESS NOTE ADULT - SUBJECTIVE AND OBJECTIVE BOX
STATUS POST:      INTERVAL HPI/OVERNIGHT EVENTS:      SUBJECTIVE:     amLODIPine   Tablet 10 milliGRAM(s) Oral daily  aspirin enteric coated 81 milliGRAM(s) Oral daily  heparin  Injectable 5000 Unit(s) SubCutaneous every 8 hours  hydrALAZINE Injectable 10 milliGRAM(s) IV Push every 4 hours PRN  labetalol Injectable 20 milliGRAM(s) IV Push every 6 hours PRN  lisinopril 40 milliGRAM(s) Oral daily  piperacillin/tazobactam IVPB.. 3.375 Gram(s) IV Intermittent every 6 hours  vancomycin  IVPB      vancomycin  IVPB 1500 milliGRAM(s) IV Intermittent every 12 hours      Vital Signs Last 24 Hrs  T(C): 37 (12 Apr 2020 06:51), Max: 38.3 (12 Apr 2020 00:56)  T(F): 98.6 (12 Apr 2020 06:51), Max: 101 (12 Apr 2020 00:56)  HR: 77 (12 Apr 2020 07:18) (77 - 99)  BP: 143/43 (12 Apr 2020 07:18) (134/62 - 168/58)  BP(mean): 92 (11 Apr 2020 16:00) (92 - 92)  RR: 22 (12 Apr 2020 07:18) (20 - 32)  SpO2: 94% (12 Apr 2020 07:18) (93% - 100%)  I&O's Detail    11 Apr 2020 07:01  -  12 Apr 2020 07:00  --------------------------------------------------------  IN:    Oral Fluid: 650 mL    sodium chloride 0.9%.: 1900 mL    Solution: 1000 mL    Solution: 250 mL    Solution: 352.4 mL  Total IN: 4152.4 mL    OUT:    Ileostomy: 550 mL    Voided: 1750 mL  Total OUT: 2300 mL    Total NET: 1852.4 mL      12 Apr 2020 07:01  -  12 Apr 2020 08:07  --------------------------------------------------------  IN:    sodium chloride 0.9%.: 100 mL  Total IN: 100 mL    OUT:  Total OUT: 0 mL    Total NET: 100 mL          Physical Exam:          LABS:                        7.5    7.65  )-----------( 342      ( 11 Apr 2020 06:45 )             22.9     04-11    129<L>  |  97  |  6<L>  ----------------------------<  263<H>  3.6   |  22  |  0.84    Ca    7.3<L>      11 Apr 2020 06:45  Phos  2.3     04-11  Mg     1.9     04-11            RADIOLOGY & ADDITIONAL STUDIES: STATUS POST:  POD # 12 s/p Ex Lap , subtotal colectomy    INTERVAL HPI/OVERNIGHT EVENTS: Fever of 101F, CXR no findings.      SUBJECTIVE: Examined at the bedside resting. Denies nausea, vomiting, cp, sob. No acute complaints    amLODIPine   Tablet 10 milliGRAM(s) Oral daily  aspirin enteric coated 81 milliGRAM(s) Oral daily  heparin  Injectable 5000 Unit(s) SubCutaneous every 8 hours  hydrALAZINE Injectable 10 milliGRAM(s) IV Push every 4 hours PRN  labetalol Injectable 20 milliGRAM(s) IV Push every 6 hours PRN  lisinopril 40 milliGRAM(s) Oral daily  piperacillin/tazobactam IVPB.. 3.375 Gram(s) IV Intermittent every 6 hours  vancomycin  IVPB      vancomycin  IVPB 1500 milliGRAM(s) IV Intermittent every 12 hours      Vital Signs Last 24 Hrs  T(C): 37 (12 Apr 2020 06:51), Max: 38.3 (12 Apr 2020 00:56)  T(F): 98.6 (12 Apr 2020 06:51), Max: 101 (12 Apr 2020 00:56)  HR: 77 (12 Apr 2020 07:18) (77 - 99)  BP: 143/43 (12 Apr 2020 07:18) (134/62 - 168/58)  BP(mean): 92 (11 Apr 2020 16:00) (92 - 92)  RR: 22 (12 Apr 2020 07:18) (20 - 32)  SpO2: 94% (12 Apr 2020 07:18) (93% - 100%)  I&O's Detail    11 Apr 2020 07:01  -  12 Apr 2020 07:00  --------------------------------------------------------  IN:    Oral Fluid: 650 mL    sodium chloride 0.9%.: 1900 mL    Solution: 1000 mL    Solution: 250 mL    Solution: 352.4 mL  Total IN: 4152.4 mL    OUT:    Ileostomy: 550 mL    Voided: 1750 mL  Total OUT: 2300 mL    Total NET: 1852.4 mL      12 Apr 2020 07:01  -  12 Apr 2020 08:07  --------------------------------------------------------  IN:    sodium chloride 0.9%.: 100 mL  Total IN: 100 mL    OUT:  Total OUT: 0 mL    Total NET: 100 mL          Physical Exam:  General: NAD, resting comfortably in bed  Pulmonary: Nonlabored breathing, no respiratory distress  Cardiovascular: NSR  Abdominal: soft, non-tender, non-distended, midline incision wound with salmon colored drainage, fascial sutures evident,   Extremities: WWP,   Neuro: A/O x 3,   Pulses: palpable distal pulses         LABS:                        7.5    7.65  )-----------( 342      ( 11 Apr 2020 06:45 )             22.9     04-11    129<L>  |  97  |  6<L>  ----------------------------<  263<H>  3.6   |  22  |  0.84    Ca    7.3<L>      11 Apr 2020 06:45  Phos  2.3     04-11  Mg     1.9     04-11            RADIOLOGY & ADDITIONAL STUDIES:

## 2020-04-12 NOTE — PROGRESS NOTE ADULT - ASSESSMENT
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 Now s/p sub total colectomy and end ileostomy on 3/31, noted to have purulence from wound with fever on 4/9 and Fever of 101 overnight. Concern for possible intraabdominal infection vs wound dehiscence. 59 y/o M with PMH of HTN, DM complicated by neuropathy, EtOH liver cirrhosis, presenting w/ pneumatosis admitted to SICU for hemodynamic monitoring. Now s/p sub total colectomy and end ileostomy on 3/31, noted to have purulence from wound with fever on 4/9 and Fever of 101 overnight. Concern for possible intraabdominal infection vs wound dehiscence.     Pain/Nausea control  CLD/IVF  Vanc/Zosyn  OOB/IS  f/u UCx  f/u BCx

## 2020-04-13 ENCOUNTER — RESULT REVIEW (OUTPATIENT)
Age: 60
End: 2020-04-13

## 2020-04-13 DIAGNOSIS — E78.5 HYPERLIPIDEMIA, UNSPECIFIED: ICD-10-CM

## 2020-04-13 DIAGNOSIS — F10.10 ALCOHOL ABUSE, UNCOMPLICATED: ICD-10-CM

## 2020-04-13 DIAGNOSIS — E08.69 DIABETES MELLITUS DUE TO UNDERLYING CONDITION WITH OTHER SPECIFIED COMPLICATION: ICD-10-CM

## 2020-04-13 DIAGNOSIS — U07.1 COVID-19: ICD-10-CM

## 2020-04-13 DIAGNOSIS — K59.39 OTHER MEGACOLON: ICD-10-CM

## 2020-04-13 DIAGNOSIS — I10 ESSENTIAL (PRIMARY) HYPERTENSION: ICD-10-CM

## 2020-04-13 DIAGNOSIS — D64.9 ANEMIA, UNSPECIFIED: ICD-10-CM

## 2020-04-13 LAB
-  AMPICILLIN: SIGNIFICANT CHANGE UP
-  AMPICILLIN: SIGNIFICANT CHANGE UP
-  DAPTOMYCIN: SIGNIFICANT CHANGE UP
-  LEVOFLOXACIN: SIGNIFICANT CHANGE UP
-  LINEZOLID: SIGNIFICANT CHANGE UP
-  VANCOMYCIN: SIGNIFICANT CHANGE UP
-  VANCOMYCIN: SIGNIFICANT CHANGE UP
ANION GAP SERPL CALC-SCNC: 13 MMOL/L — SIGNIFICANT CHANGE UP (ref 5–17)
BLD GP AB SCN SERPL QL: NEGATIVE — SIGNIFICANT CHANGE UP
BUN SERPL-MCNC: 6 MG/DL — LOW (ref 7–23)
CALCIUM SERPL-MCNC: 7.3 MG/DL — LOW (ref 8.4–10.5)
CHLORIDE SERPL-SCNC: 100 MMOL/L — SIGNIFICANT CHANGE UP (ref 96–108)
CO2 SERPL-SCNC: 19 MMOL/L — LOW (ref 22–31)
CREAT SERPL-MCNC: 1.3 MG/DL — SIGNIFICANT CHANGE UP (ref 0.5–1.3)
CULTURE RESULTS: NO GROWTH — SIGNIFICANT CHANGE UP
CULTURE RESULTS: SIGNIFICANT CHANGE UP
GLUCOSE BLDC GLUCOMTR-MCNC: 147 MG/DL — HIGH (ref 70–99)
GLUCOSE BLDC GLUCOMTR-MCNC: 157 MG/DL — HIGH (ref 70–99)
GLUCOSE BLDC GLUCOMTR-MCNC: 166 MG/DL — HIGH (ref 70–99)
GLUCOSE BLDC GLUCOMTR-MCNC: 232 MG/DL — HIGH (ref 70–99)
GLUCOSE BLDC GLUCOMTR-MCNC: 275 MG/DL — HIGH (ref 70–99)
GLUCOSE SERPL-MCNC: 248 MG/DL — HIGH (ref 70–99)
HCT VFR BLD CALC: 19 % — CRITICAL LOW (ref 39–50)
HCT VFR BLD CALC: 19.4 % — CRITICAL LOW (ref 39–50)
HGB BLD-MCNC: 6.1 G/DL — CRITICAL LOW (ref 13–17)
HGB BLD-MCNC: 6.3 G/DL — CRITICAL LOW (ref 13–17)
MAGNESIUM SERPL-MCNC: 1.8 MG/DL — SIGNIFICANT CHANGE UP (ref 1.6–2.6)
MCHC RBC-ENTMCNC: 28.8 PG — SIGNIFICANT CHANGE UP (ref 27–34)
MCHC RBC-ENTMCNC: 29.2 PG — SIGNIFICANT CHANGE UP (ref 27–34)
MCHC RBC-ENTMCNC: 32.1 GM/DL — SIGNIFICANT CHANGE UP (ref 32–36)
MCHC RBC-ENTMCNC: 32.5 GM/DL — SIGNIFICANT CHANGE UP (ref 32–36)
MCV RBC AUTO: 89.6 FL — SIGNIFICANT CHANGE UP (ref 80–100)
MCV RBC AUTO: 89.8 FL — SIGNIFICANT CHANGE UP (ref 80–100)
METHOD TYPE: SIGNIFICANT CHANGE UP
NRBC # BLD: 0 /100 WBCS — SIGNIFICANT CHANGE UP (ref 0–0)
NRBC # BLD: 0 /100 WBCS — SIGNIFICANT CHANGE UP (ref 0–0)
ORGANISM # SPEC MICROSCOPIC CNT: SIGNIFICANT CHANGE UP
PHOSPHATE SERPL-MCNC: 2.3 MG/DL — LOW (ref 2.5–4.5)
PLATELET # BLD AUTO: 343 K/UL — SIGNIFICANT CHANGE UP (ref 150–400)
PLATELET # BLD AUTO: 389 K/UL — SIGNIFICANT CHANGE UP (ref 150–400)
POTASSIUM SERPL-MCNC: 3.6 MMOL/L — SIGNIFICANT CHANGE UP (ref 3.5–5.3)
POTASSIUM SERPL-SCNC: 3.6 MMOL/L — SIGNIFICANT CHANGE UP (ref 3.5–5.3)
RBC # BLD: 2.12 M/UL — LOW (ref 4.2–5.8)
RBC # BLD: 2.16 M/UL — LOW (ref 4.2–5.8)
RBC # FLD: 13.9 % — SIGNIFICANT CHANGE UP (ref 10.3–14.5)
RBC # FLD: 14 % — SIGNIFICANT CHANGE UP (ref 10.3–14.5)
RH IG SCN BLD-IMP: POSITIVE — SIGNIFICANT CHANGE UP
SARS-COV-2 RNA SPEC QL NAA+PROBE: DETECTED
SODIUM SERPL-SCNC: 132 MMOL/L — LOW (ref 135–145)
SPECIMEN SOURCE: SIGNIFICANT CHANGE UP
SPECIMEN SOURCE: SIGNIFICANT CHANGE UP
WBC # BLD: 7.53 K/UL — SIGNIFICANT CHANGE UP (ref 3.8–10.5)
WBC # BLD: 7.84 K/UL — SIGNIFICANT CHANGE UP (ref 3.8–10.5)
WBC # FLD AUTO: 7.53 K/UL — SIGNIFICANT CHANGE UP (ref 3.8–10.5)
WBC # FLD AUTO: 7.84 K/UL — SIGNIFICANT CHANGE UP (ref 3.8–10.5)

## 2020-04-13 PROCEDURE — 49083 ABD PARACENTESIS W/IMAGING: CPT

## 2020-04-13 PROCEDURE — 88112 CYTOPATH CELL ENHANCE TECH: CPT | Mod: 26

## 2020-04-13 PROCEDURE — 99254 IP/OBS CNSLTJ NEW/EST MOD 60: CPT

## 2020-04-13 PROCEDURE — 88305 TISSUE EXAM BY PATHOLOGIST: CPT | Mod: 26

## 2020-04-13 RX ORDER — HYDROXYCHLOROQUINE SULFATE 200 MG
400 TABLET ORAL EVERY 24 HOURS
Refills: 0 | Status: DISCONTINUED | OUTPATIENT
Start: 2020-04-14 | End: 2020-04-16

## 2020-04-13 RX ORDER — HYDROXYCHLOROQUINE SULFATE 200 MG
800 TABLET ORAL EVERY 24 HOURS
Refills: 0 | Status: COMPLETED | OUTPATIENT
Start: 2020-04-13 | End: 2020-04-13

## 2020-04-13 RX ORDER — AZITHROMYCIN 500 MG/1
500 TABLET, FILM COATED ORAL ONCE
Refills: 0 | Status: COMPLETED | OUTPATIENT
Start: 2020-04-13 | End: 2020-04-13

## 2020-04-13 RX ORDER — HYDROXYCHLOROQUINE SULFATE 200 MG
TABLET ORAL
Refills: 0 | Status: DISCONTINUED | OUTPATIENT
Start: 2020-04-13 | End: 2020-04-16

## 2020-04-13 RX ORDER — AZITHROMYCIN 500 MG/1
250 TABLET, FILM COATED ORAL EVERY 24 HOURS
Refills: 0 | Status: DISCONTINUED | OUTPATIENT
Start: 2020-04-14 | End: 2020-04-16

## 2020-04-13 RX ORDER — MAGNESIUM SULFATE 500 MG/ML
1 VIAL (ML) INJECTION ONCE
Refills: 0 | Status: COMPLETED | OUTPATIENT
Start: 2020-04-13 | End: 2020-04-13

## 2020-04-13 RX ORDER — VANCOMYCIN HCL 1 G
1500 VIAL (EA) INTRAVENOUS EVERY 12 HOURS
Refills: 0 | Status: DISCONTINUED | OUTPATIENT
Start: 2020-04-13 | End: 2020-04-13

## 2020-04-13 RX ORDER — POTASSIUM PHOSPHATE, MONOBASIC POTASSIUM PHOSPHATE, DIBASIC 236; 224 MG/ML; MG/ML
15 INJECTION, SOLUTION INTRAVENOUS ONCE
Refills: 0 | Status: COMPLETED | OUTPATIENT
Start: 2020-04-13 | End: 2020-04-13

## 2020-04-13 RX ORDER — AZITHROMYCIN 500 MG/1
TABLET, FILM COATED ORAL
Refills: 0 | Status: DISCONTINUED | OUTPATIENT
Start: 2020-04-13 | End: 2020-04-16

## 2020-04-13 RX ADMIN — GABAPENTIN 100 MILLIGRAM(S): 400 CAPSULE ORAL at 04:27

## 2020-04-13 RX ADMIN — ONDANSETRON 4 MILLIGRAM(S): 8 TABLET, FILM COATED ORAL at 17:27

## 2020-04-13 RX ADMIN — GABAPENTIN 100 MILLIGRAM(S): 400 CAPSULE ORAL at 23:57

## 2020-04-13 RX ADMIN — Medication 81 MILLIGRAM(S): at 12:53

## 2020-04-13 RX ADMIN — ATORVASTATIN CALCIUM 10 MILLIGRAM(S): 80 TABLET, FILM COATED ORAL at 23:57

## 2020-04-13 RX ADMIN — OXYCODONE AND ACETAMINOPHEN 1 TABLET(S): 5; 325 TABLET ORAL at 04:27

## 2020-04-13 RX ADMIN — Medication 2: at 23:59

## 2020-04-13 RX ADMIN — Medication 800 MILLIGRAM(S): at 23:58

## 2020-04-13 RX ADMIN — GABAPENTIN 100 MILLIGRAM(S): 400 CAPSULE ORAL at 18:39

## 2020-04-13 RX ADMIN — Medication 100 GRAM(S): at 11:40

## 2020-04-13 RX ADMIN — AMLODIPINE BESYLATE 10 MILLIGRAM(S): 2.5 TABLET ORAL at 04:27

## 2020-04-13 RX ADMIN — Medication 2 MILLIGRAM(S): at 04:27

## 2020-04-13 RX ADMIN — Medication 40 MICROGRAM(S): at 23:59

## 2020-04-13 RX ADMIN — Medication 2: at 17:34

## 2020-04-13 RX ADMIN — PIPERACILLIN AND TAZOBACTAM 200 GRAM(S): 4; .5 INJECTION, POWDER, LYOPHILIZED, FOR SOLUTION INTRAVENOUS at 12:53

## 2020-04-13 RX ADMIN — Medication 2 MILLIGRAM(S): at 12:53

## 2020-04-13 RX ADMIN — Medication 300 MILLIGRAM(S): at 13:37

## 2020-04-13 RX ADMIN — Medication 4 UNIT(S): at 18:46

## 2020-04-13 RX ADMIN — Medication 650 MILLIGRAM(S): at 12:53

## 2020-04-13 RX ADMIN — PIPERACILLIN AND TAZOBACTAM 200 GRAM(S): 4; .5 INJECTION, POWDER, LYOPHILIZED, FOR SOLUTION INTRAVENOUS at 23:57

## 2020-04-13 RX ADMIN — Medication 4 UNIT(S): at 07:44

## 2020-04-13 RX ADMIN — POTASSIUM PHOSPHATE, MONOBASIC POTASSIUM PHOSPHATE, DIBASIC 63.75 MILLIMOLE(S): 236; 224 INJECTION, SOLUTION INTRAVENOUS at 16:02

## 2020-04-13 RX ADMIN — PIPERACILLIN AND TAZOBACTAM 200 GRAM(S): 4; .5 INJECTION, POWDER, LYOPHILIZED, FOR SOLUTION INTRAVENOUS at 18:38

## 2020-04-13 RX ADMIN — INSULIN GLARGINE 10 UNIT(S): 100 INJECTION, SOLUTION SUBCUTANEOUS at 23:59

## 2020-04-13 RX ADMIN — PIPERACILLIN AND TAZOBACTAM 200 GRAM(S): 4; .5 INJECTION, POWDER, LYOPHILIZED, FOR SOLUTION INTRAVENOUS at 04:27

## 2020-04-13 RX ADMIN — Medication 4: at 07:44

## 2020-04-13 RX ADMIN — Medication 2 MILLIGRAM(S): at 18:39

## 2020-04-13 RX ADMIN — GABAPENTIN 100 MILLIGRAM(S): 400 CAPSULE ORAL at 12:53

## 2020-04-13 RX ADMIN — Medication 2 MILLIGRAM(S): at 23:58

## 2020-04-13 RX ADMIN — Medication 50 MILLILITER(S): at 05:00

## 2020-04-13 NOTE — CONSULT NOTE ADULT - SUBJECTIVE AND OBJECTIVE BOX
HPI: 60y Male with PMH of HTN, DM (A1c 6.9-7.6%) complicated by neuropathy, EtOH liver cirrhosis, who presents to [facility] for fall at home. At [facility] patient reported that he takes lactulose at home daily and has daily BM, but he has had no BM for the last 5 days. Labs concerning for WBC 19, Cr 2.28, Lactate of 8.6 (6.8 after 2L of IVF). CT showing distended colon with small area of pneumatosis. Now s/p sub total colectomy and end ileostomy on 3/31, noted to have purulence from wound with fever on  and Fever of 101 overnight. Now concern for possible intra-abdominal infection vs wound dehiscence.     Endocrine was consulted for DM management.     FSG during hospitalization and insulin regimen:     PMH:   PSH:   SH:   FH:   Home Meds:   Allergies:  Outpatient Endocrinologist:     Age at Dx:  How dx:  Hx and duration of insulin:  Current Therapy:  Hx of other regimens:  Hx of DKA/HHS:  Complications: neuropathy? retinopathy?    Home FSG:  Fasting  Lunch  Dinner  Bed  Hx of hypoglycemia:     Diet at home:   B:   L:   D:   Snacks/Soda:     Current Meds:  acetaminophen   Tablet .. 650 milliGRAM(s) Oral every 6 hours PRN  amLODIPine   Tablet 10 milliGRAM(s) Oral daily  aspirin enteric coated 81 milliGRAM(s) Oral daily  atorvastatin 10 milliGRAM(s) Oral at bedtime  dextrose 40% Gel 15 Gram(s) Oral once PRN  dextrose 5%. 1000 milliLiter(s) IV Continuous <Continuous>  dextrose 50% Injectable 12.5 Gram(s) IV Push once  dextrose 50% Injectable 25 Gram(s) IV Push once  dextrose 50% Injectable 25 Gram(s) IV Push once  gabapentin 100 milliGRAM(s) Oral every 6 hours  glucagon  Injectable 1 milliGRAM(s) IntraMuscular once PRN  hydrALAZINE Injectable 10 milliGRAM(s) IV Push every 4 hours PRN  insulin glargine Injectable (LANTUS) 10 Unit(s) SubCutaneous at bedtime  insulin lispro (HumaLOG) corrective regimen sliding scale   SubCutaneous Before meals and at bedtime  insulin lispro Injectable (HumaLOG) 4 Unit(s) SubCutaneous three times a day before meals  labetalol Injectable 20 milliGRAM(s) IV Push every 6 hours PRN  levothyroxine Injectable 40 MICROGram(s) IV Push at bedtime  loperamide 2 milliGRAM(s) Oral every 6 hours  magnesium sulfate  IVPB 1 Gram(s) IV Intermittent once  ondansetron Injectable 4 milliGRAM(s) IV Push every 6 hours PRN  oxycodone    5 mG/acetaminophen 325 mG 1 Tablet(s) Oral every 6 hours PRN  piperacillin/tazobactam IVPB.. 3.375 Gram(s) IV Intermittent every 6 hours  potassium phosphate IVPB 15 milliMole(s) IV Intermittent once  sodium chloride 0.9%. 1000 milliLiter(s) IV Continuous <Continuous>  traZODone 50 milliGRAM(s) Oral at bedtime PRN  vancomycin  IVPB 1500 milliGRAM(s) IV Intermittent every 12 hours      ROS:  Denies the following except as indicated.    General: weight loss/weight gain, decreased appetite, fatigue  Eyes: Blurry vision, double vision, visual changes  ENT: Throat pain, changes in voice,   CV: palpitations, SOB, CP, cough  GI: NVD, difficulty swallowing, abdominal pain  : polyuria, dysuria  Endo: abnormal menses, temperature intolerance, decreased libido  MSK: weakness, joint pain  Skin: rash, dryness, diaphoresis  Heme: Easy bruising,bleeding  Neuro: HA, dizziness, lightheadedness, numbness tingling  Psych: Anxiety, Depression    Vital Signs Last 24 Hrs  T(C): 37.3 (2020 05:00), Max: 38.8 (2020 22:49)  T(F): 99.2 (2020 05:00), Max: 101.9 (2020 22:49)  HR: 87 (2020 04:28) (82 - 90)  BP: 149/58 (2020 04:28) (131/55 - 149/58)  BP(mean): --  RR: 22 (2020 04:28) (20 - 22)  SpO2: 92% (2020 04:28) (92% - 97%)      Constitutional: wn/wd in NAD.   HEENT: NCAT, OP clear, EOMI, , no proptosis or lid retraction  Neck: no thyromegaly or palpable thyroid nodules   Respiratory: lungs CTAB.  Cardiovascular: regular rhythm, normal S1 and S2, no audible murmurs, no peripheral edema  GI: soft, NT/ND, no masses/HSM appreciated.  Neurology: no tremors, DTR 2+  Skin: no visible rashes/lesions  Psychiatric: AAO x 3, normal affect/mood.  Ext: radial pulses intact, DP pulses intact, extremities warm, no cyanosis, clubbing or edema.       LABS:                        6.1    7.84  )-----------( 389      ( 2020 06:56 )             19.0     04-13    132<L>  |  100  |  6<L>  ----------------------------<  248<H>  3.6   |  19<L>  |  1.30    Ca    7.3<L>      2020 06:56  Phos  2.3       Mg     1.8             Urinalysis Basic - ( 2020 10:36 )    Color: Yellow / Appearance: Clear / S.010 / pH: x  Gluc: x / Ketone: NEGATIVE  / Bili: Negative / Urobili: 0.2 E.U./dL   Blood: x / Protein: Trace mg/dL / Nitrite: NEGATIVE   Leuk Esterase: NEGATIVE / RBC: < 5 /HPF / WBC < 5 /HPF   Sq Epi: x / Non Sq Epi: 0-5 /HPF / Bacteria: Present /HPF      Hemoglobin A1C, Whole Blood: 6.9 ( @ 06:59)  Hemoglobin A1C, Whole Blood: 7.6 ( @ 05:58)        RADIOLOGY & ADDITIONAL STUDIES:  CAPILLARY BLOOD GLUCOSE      POCT Blood Glucose.: 232 mg/dL (2020 07:42)  POCT Blood Glucose.: 275 mg/dL (2020 06:35)  POCT Blood Glucose.: 157 mg/dL (2020 03:37)  POCT Blood Glucose.: 122 mg/dL (2020 21:27)  POCT Blood Glucose.: 414 mg/dL (2020 18:37)  POCT Blood Glucose.: 509 mg/dL (2020 16:52)  POCT Blood Glucose.: 454 mg/dL (2020 16:50)  POCT Blood Glucose.: 484 mg/dL (2020 16:22)      A/P:60y Male    1.  DM  Please continue lantus       units at night / morning.    Please continue lispro      units before each meal.    Please continue lispro moderate / low dose sliding scale four times daily with meals and at bedtime  Pt's fingerstick glucose goal is   Will continue to monitor     For discharge, TBD    Pt can follow up at discharge with [facility] by calling  to make an appointment.   Will discuss case with     and update primary team    REMINDERS FOR INSULIN/DIABETES SUPPLIES at DISCHARGE:  INSULIN:   Long actin/Basal Insulin: Examples: Toujeo, Basaglar, Tresiba, Lantus   Short acting/Bolus Insulin: Humalog, Admelog, Novolog  Please ensure that BOTH short acting and long acting insulin are prescribed in the same preparation (Ex: PEN vs VIAL/SOLUTION)     TESTING SUPPLIES:   All glucometer supplies should be written as generic to avoid issues with insurance. Use the free text option in sunrise prescription writer, and type in glucometer test strips, lancets, etc to order.    If sending patient home on insulin PEN, please send:   •	BD sharon insulin pen needles for use up to 4 times daily (total quantity 100)  •	Lancets for use up to 4 times daily (total quantity 100)  •	Glucometer Test strips for use up to 4 times daily (total quantity 100)  •	Alcohol swabs for use up to 4 times daily (total quantity 100)  •	Glucometer (If provided by hospital, still provide scripts for lancets, test strips, and swabs)  If sending patient home on insulin VIAL, please send:   •	Insulin syringes (6mm) - for use up to 4 times daily (total quantity 100)  •	Lancets for use up to 4 times daily (total quantity 100)  •	Generic Glucometer Test strips for use up to 4 times daily (total quantity 100)  •	Alcohol swabs for use up to 4 times daily (total quantity 100)  •	Generic Glucometer (If provided by hospital, still provide scripts for lancets, test strips, and swabs)  •	Do not specify brand for testing supplies (such as contour, freestyle, one touch etc) that way the pharmacy has the freedom to pick and change according to what the insurance dictates.  For patients without insurance:   •	Provide social work with appropriate scripts so they may obtain 1 week of samples  •	Provide with glucometer. Glucometers are located at various nursing stations, the nursing office, education, and endocrine fellows office.  •	Please make appointment with Dorys Townsend NP or Jade Woodard RN and MAVIS Culp at the [address] [place] [address] endocrinology clinic. They can see patients without insurance, provide appropriate samples, and assist in getting insurance coverage.     PREFERRED PHARMACY:  [facility] (located on 1st floor next to admitting)  P: [phone]  Hours: M – F 8AM – 8PM, Sat 8AM – 4PM, Sun—closed  If not using [facility], please follow up with chosen pharmacy to ensure insulin prescribed is covered. HPI: 60y Male with PMH of HTN, DM (A1c 6.9-7.6%) complicated by neuropathy, EtOH liver cirrhosis, who presents to [facility] for fall at home. At [facility] patient reported that he takes lactulose at home daily and has daily BM, but he has had no BM for the last 5 days. Labs concerning for WBC 19, Cr 2.28, Lactate of 8.6 (6.8 after 2L of IVF). CT showing distended colon with small area of pneumatosis. Now s/p sub total colectomy and end ileostomy on 3/31, noted to have purulence from wound with fever on  and Fever of 101 overnight. Now concern for possible intra-abdominal infection vs wound dehiscence.     Endocrine was consulted for DM management. He notes he takes levemir 25 to 30 units HS or BID? and Novolog TID (15, 15, 10). If sugar is above 200 he does not given himself insulin.     PMH:   PSH: as per EMR   SH: denies smoking, alcohol and drug use.   FH: no diabetes in his family. Lives in a shelter.   Home Meds:     Current Meds:  acetaminophen   Tablet .. 650 milliGRAM(s) Oral every 6 hours PRN  amLODIPine   Tablet 10 milliGRAM(s) Oral daily  aspirin enteric coated 81 milliGRAM(s) Oral daily  atorvastatin 10 milliGRAM(s) Oral at bedtime  dextrose 40% Gel 15 Gram(s) Oral once PRN  dextrose 5%. 1000 milliLiter(s) IV Continuous <Continuous>  dextrose 50% Injectable 12.5 Gram(s) IV Push once  dextrose 50% Injectable 25 Gram(s) IV Push once  dextrose 50% Injectable 25 Gram(s) IV Push once  gabapentin 100 milliGRAM(s) Oral every 6 hours  glucagon  Injectable 1 milliGRAM(s) IntraMuscular once PRN  hydrALAZINE Injectable 10 milliGRAM(s) IV Push every 4 hours PRN  insulin glargine Injectable (LANTUS) 10 Unit(s) SubCutaneous at bedtime  insulin lispro (HumaLOG) corrective regimen sliding scale   SubCutaneous Before meals and at bedtime  insulin lispro Injectable (HumaLOG) 4 Unit(s) SubCutaneous three times a day before meals  labetalol Injectable 20 milliGRAM(s) IV Push every 6 hours PRN  levothyroxine Injectable 40 MICROGram(s) IV Push at bedtime  loperamide 2 milliGRAM(s) Oral every 6 hours  magnesium sulfate  IVPB 1 Gram(s) IV Intermittent once  ondansetron Injectable 4 milliGRAM(s) IV Push every 6 hours PRN  oxycodone    5 mG/acetaminophen 325 mG 1 Tablet(s) Oral every 6 hours PRN  piperacillin/tazobactam IVPB.. 3.375 Gram(s) IV Intermittent every 6 hours  potassium phosphate IVPB 15 milliMole(s) IV Intermittent once  sodium chloride 0.9%. 1000 milliLiter(s) IV Continuous <Continuous>  traZODone 50 milliGRAM(s) Oral at bedtime PRN  vancomycin  IVPB 1500 milliGRAM(s) IV Intermittent every 12 hours      ROS:  Denies the following except as indicated.    General: weight loss/weight gain, decreased appetite, fatigue  Eyes: Blurry vision, double vision, visual changes  ENT: Throat pain, changes in voice,   CV: palpitations, SOB, CP, cough  GI: NVD, difficulty swallowing, abdominal pain  : polyuria, dysuria  Endo: abnormal menses, temperature intolerance, decreased libido  MSK: weakness, joint pain  Skin: rash, dryness, diaphoresis  Heme: Easy bruising,bleeding  Neuro: HA, dizziness, lightheadedness, numbness tingling  Psych: Anxiety, Depression    Vital Signs Last 24 Hrs  T(C): 37.3 (2020 05:00), Max: 38.8 (2020 22:49)  T(F): 99.2 (2020 05:00), Max: 101.9 (2020 22:49)  HR: 87 (2020 04:28) (82 - 90)  BP: 149/58 (2020 04:28) (131/55 - 149/58)  BP(mean): --  RR: 22 (2020 04:28) (20 - 22)  SpO2: 92% (2020 04:28) (92% - 97%)      Constitutional: wn/wd in NAD.   HEENT: NCAT, OP clear, EOMI, , no proptosis or lid retraction  Neck: no thyromegaly or palpable thyroid nodules   Respiratory: lungs CTAB.  Cardiovascular: regular rhythm, normal S1 and S2, no audible murmurs, no peripheral edema  GI: soft, NT/ND, no masses/HSM appreciated.  Neurology: no tremors, DTR 2+  Skin: no visible rashes/lesions  Psychiatric: AAO x 3, normal affect/mood.  Ext: radial pulses intact, DP pulses intact, extremities warm, no cyanosis, clubbing or edema.       LABS:                        6.1    7.84  )-----------( 389      ( 2020 06:56 )             19.0     04-13    132<L>  |  100  |  6<L>  ----------------------------<  248<H>  3.6   |  19<L>  |  1.30    Ca    7.3<L>      2020 06:56  Phos  2.3     -  Mg     1.8     -13        Urinalysis Basic - ( 2020 10:36 )    Color: Yellow / Appearance: Clear / S.010 / pH: x  Gluc: x / Ketone: NEGATIVE  / Bili: Negative / Urobili: 0.2 E.U./dL   Blood: x / Protein: Trace mg/dL / Nitrite: NEGATIVE   Leuk Esterase: NEGATIVE / RBC: < 5 /HPF / WBC < 5 /HPF   Sq Epi: x / Non Sq Epi: 0-5 /HPF / Bacteria: Present /HPF      Hemoglobin A1C, Whole Blood: 6.9 ( @ 06:59)  Hemoglobin A1C, Whole Blood: 7.6 ( @ 05:58)        RADIOLOGY & ADDITIONAL STUDIES:  CAPILLARY BLOOD GLUCOSE      POCT Blood Glucose.: 232 mg/dL (2020 07:42)  POCT Blood Glucose.: 275 mg/dL (2020 06:35)  POCT Blood Glucose.: 157 mg/dL (2020 03:37)  POCT Blood Glucose.: 122 mg/dL (2020 21:27)  POCT Blood Glucose.: 414 mg/dL (2020 18:37)  POCT Blood Glucose.: 509 mg/dL (2020 16:52)  POCT Blood Glucose.: 454 mg/dL (2020 16:50)  POCT Blood Glucose.: 484 mg/dL (2020 16:22)      A/P:60y Male    1.  DM  Please continue lantus       units at night / morning.    Please continue lispro      units before each meal.    Please continue lispro moderate / low dose sliding scale four times daily with meals and at bedtime  Pt's fingerstick glucose goal is   Will continue to monitor     For discharge, TBD    Pt can follow up at discharge with [facility] by calling  to make an appointment.   Will discuss case with     and update primary team    REMINDERS FOR INSULIN/DIABETES SUPPLIES at DISCHARGE:  INSULIN:   Long actin/Basal Insulin: Examples: Toujeo, Basaglar, Tresiba, Lantus   Short acting/Bolus Insulin: Humalog, Admelog, Novolog  Please ensure that BOTH short acting and long acting insulin are prescribed in the same preparation (Ex: PEN vs VIAL/SOLUTION)     TESTING SUPPLIES:   All glucometer supplies should be written as generic to avoid issues with insurance. Use the free text option in sunrise prescription writer, and type in glucometer test strips, lancets, etc to order.    If sending patient home on insulin PEN, please send:   •	BD sharon insulin pen needles for use up to 4 times daily (total quantity 100)  •	Lancets for use up to 4 times daily (total quantity 100)  •	Glucometer Test strips for use up to 4 times daily (total quantity 100)  •	Alcohol swabs for use up to 4 times daily (total quantity 100)  •	Glucometer (If provided by hospital, still provide scripts for lancets, test strips, and swabs)  If sending patient home on insulin VIAL, please send:   •	Insulin syringes (6mm) - for use up to 4 times daily (total quantity 100)  •	Lancets for use up to 4 times daily (total quantity 100)  •	Generic Glucometer Test strips for use up to 4 times daily (total quantity 100)  •	Alcohol swabs for use up to 4 times daily (total quantity 100)  •	Generic Glucometer (If provided by hospital, still provide scripts for lancets, test strips, and swabs)  •	Do not specify brand for testing supplies (such as contour, freestyle, one touch etc) that way the pharmacy has the freedom to pick and change according to what the insurance dictates.  For patients without insurance:   •	Provide social work with appropriate scripts so they may obtain 1 week of samples  •	Provide with glucometer. Glucometers are located at various nursing stations, the nursing office, education, and endocrine fellows office.  •	Please make appointment with Dorys Townsend NP or Jade Woodard RN and MAVIS Culp at the [address] [place] [address] endocrinology clinic. They can see patients without insurance, provide appropriate samples, and assist in getting insurance coverage.     PREFERRED PHARMACY:  [facility] (located on 1st floor next to admitting)  P: [phone]  Hours: M – F 8AM – 8PM, Sat 8AM – 4PM, Sun—closed  If not using [employer], please follow up with chosen pharmacy to ensure insulin prescribed is covered. HPI: 60y Male with PMH of HTN, DM (A1c 6.9-7.6%) complicated by neuropathy, EtOH liver cirrhosis, who presents to [facility] for fall at home. At [facility] patient reported that he takes lactulose at home daily and has daily BM, but he has had no BM for the last 5 days. Labs concerning for WBC 19, Cr 2.28, Lactate of 8.6 (6.8 after 2L of IVF). CT showing distended colon with small area of pneumatosis. Now s/p sub total colectomy and end ileostomy on 3/31, noted to have purulence from wound with fever on  and Fever of 101 overnight. Now concern for possible intra-abdominal infection vs wound dehiscence.     Endocrine was consulted for DM management. He notes he takes levemir 25 to 30 units HS or BID? and Novolog TID (15, 15, 10). If sugar is above 200 he does not given himself insulin. Pt had a drop in H/H, received 1 pRBC.     PMH: as per EMR   PSH: as per EMR   SH: denies smoking, alcohol and drug use.   FH: no diabetes in his family. Lives in a shelter.   Home Meds: janumet, levothyroxine 50mcg, levemir bid dosing?, novolog 10-15 units tid     Current Meds:  acetaminophen   Tablet .. 650 milliGRAM(s) Oral every 6 hours PRN  amLODIPine   Tablet 10 milliGRAM(s) Oral daily  aspirin enteric coated 81 milliGRAM(s) Oral daily  atorvastatin 10 milliGRAM(s) Oral at bedtime  dextrose 40% Gel 15 Gram(s) Oral once PRN  dextrose 5%. 1000 milliLiter(s) IV Continuous <Continuous>  dextrose 50% Injectable 12.5 Gram(s) IV Push once  dextrose 50% Injectable 25 Gram(s) IV Push once  dextrose 50% Injectable 25 Gram(s) IV Push once  gabapentin 100 milliGRAM(s) Oral every 6 hours  glucagon  Injectable 1 milliGRAM(s) IntraMuscular once PRN  hydrALAZINE Injectable 10 milliGRAM(s) IV Push every 4 hours PRN  insulin glargine Injectable (LANTUS) 10 Unit(s) SubCutaneous at bedtime  insulin lispro (HumaLOG) corrective regimen sliding scale   SubCutaneous Before meals and at bedtime  insulin lispro Injectable (HumaLOG) 4 Unit(s) SubCutaneous three times a day before meals  labetalol Injectable 20 milliGRAM(s) IV Push every 6 hours PRN  levothyroxine Injectable 40 MICROGram(s) IV Push at bedtime  loperamide 2 milliGRAM(s) Oral every 6 hours  magnesium sulfate  IVPB 1 Gram(s) IV Intermittent once  ondansetron Injectable 4 milliGRAM(s) IV Push every 6 hours PRN  oxycodone    5 mG/acetaminophen 325 mG 1 Tablet(s) Oral every 6 hours PRN  piperacillin/tazobactam IVPB.. 3.375 Gram(s) IV Intermittent every 6 hours  potassium phosphate IVPB 15 milliMole(s) IV Intermittent once  sodium chloride 0.9%. 1000 milliLiter(s) IV Continuous <Continuous>  traZODone 50 milliGRAM(s) Oral at bedtime PRN  vancomycin  IVPB 1500 milliGRAM(s) IV Intermittent every 12 hours      ROS:  Denies the following except as indicated.    General: weight loss/weight gain, decreased appetite, fatigue  Eyes: Blurry vision, double vision, visual changes  ENT: Throat pain, changes in voice,   CV: palpitations, SOB, CP, cough  GI: NVD, difficulty swallowing, abdominal pain  : polyuria, dysuria  MSK: weakness, joint pain  Skin: rash, dryness, diaphoresis  Heme: Easy bruising,bleeding  Neuro: HA, dizziness, lightheadedness, numbness tingling  Psych: Anxiety, Depression    Vital Signs Last 24 Hrs  T(C): 37.3 (2020 05:00), Max: 38.8 (2020 22:49)  T(F): 99.2 (2020 05:00), Max: 101.9 (2020 22:49)  HR: 87 (2020 04:28) (82 - 90)  BP: 149/58 (2020 04:28) (131/55 - 149/58)  BP(mean): --  RR: 22 (2020 04:28) (20 - 22)  SpO2: 92% (2020 04:28) (92% - 97%)    Constitutional: wn/wd in NAD.     Due to the nature of this patient’s COVID-19 isolation status (either confirmed or suspected), this note was prepared without a bedside physical examination to prevent spread of infection and to conserve personal protective equipment during this nationwide pandemic. If possible, direct patient communication occurred via electronic measures or telephone conversation. Examination highlights were provided by a bedside nurse wearing personal protective equipment and review of pertinent medical records. Objective data (vital signs, urine output, lab results, imaging studies, medications, etc) were reviewed in detail. Face to face visits and physical examination have been limited only to patients for whom it is required for medical decision making.    LABS:                        6.1    7.84  )-----------( 389      ( 2020 06:56 )             19.0     04-13    132<L>  |  100  |  6<L>  ----------------------------<  248<H>  3.6   |  19<L>  |  1.30    Ca    7.3<L>      2020 06:56  Phos  2.3       Mg     1.8     -13    Urinalysis Basic - ( 2020 10:36 )    Color: Yellow / Appearance: Clear / S.010 / pH: x  Gluc: x / Ketone: NEGATIVE  / Bili: Negative / Urobili: 0.2 E.U./dL   Blood: x / Protein: Trace mg/dL / Nitrite: NEGATIVE   Leuk Esterase: NEGATIVE / RBC: < 5 /HPF / WBC < 5 /HPF   Sq Epi: x / Non Sq Epi: 0-5 /HPF / Bacteria: Present /HPF      Hemoglobin A1C, Whole Blood: 6.9 ( @ 06:59)  Hemoglobin A1C, Whole Blood: 7.6 ( @ 05:58)      CAPILLARY BLOOD GLUCOSE  POCT Blood Glucose.: 232 mg/dL (2020 07:42)  POCT Blood Glucose.: 275 mg/dL (2020 06:35)  POCT Blood Glucose.: 157 mg/dL (2020 03:37)  POCT Blood Glucose.: 122 mg/dL (2020 21:27)  POCT Blood Glucose.: 414 mg/dL (2020 18:37)  POCT Blood Glucose.: 509 mg/dL (2020 16:52)  POCT Blood Glucose.: 454 mg/dL (2020 16:50)  POCT Blood Glucose.: 484 mg/dL (2020 16:22)      A/P:  59 y/o M with PMH of HTN, DM complicated by neuropathy, EtOH liver cirrhosis, presenting w/ pneumatosis of unknown etiology, s/p sub total colectomy and end ileostomy on 3/31, c/b fever and purulent wound infection (). Found to be covid positive.     1.  DM type 2 - 7% unreliable due to anemia, complicated. Pt has poor appetite  Please start lantus 16 units at night  Please start lispro 6 units before each meal.    Please continue lispro moderate dose sliding scale four times daily with meals and at bedtime  Pt's fingerstick glucose goal is 140-180.     2. Hypothyroidism  -please add on TSH   -Continue IV levothyroxine 40mcg daily     Will continue to monitor     For discharge, TBD    Pt can follow up at discharge with Dr. Underwood by calling  to make an appointment.   Case d/w with Dr. Underwood and primary team updated.

## 2020-04-13 NOTE — PROGRESS NOTE ADULT - SUBJECTIVE AND OBJECTIVE BOX
SUBJECTIVE: C/o abd discomfort, no N/V. Patient seen and examined bedside by chief resident.    amLODIPine   Tablet 10 milliGRAM(s) Oral daily  aspirin enteric coated 81 milliGRAM(s) Oral daily  hydrALAZINE Injectable 10 milliGRAM(s) IV Push every 4 hours PRN  labetalol Injectable 20 milliGRAM(s) IV Push every 6 hours PRN  piperacillin/tazobactam IVPB.. 3.375 Gram(s) IV Intermittent every 6 hours  vancomycin  IVPB      vancomycin  IVPB 1500 milliGRAM(s) IV Intermittent every 12 hours    MEDICATIONS  (PRN):  acetaminophen   Tablet .. 650 milliGRAM(s) Oral every 6 hours PRN Temp greater or equal to 38C (100.4F), Mild Pain (1 - 3)  dextrose 40% Gel 15 Gram(s) Oral once PRN Blood Glucose LESS THAN 70 milliGRAM(s)/deciliter  glucagon  Injectable 1 milliGRAM(s) IntraMuscular once PRN Glucose LESS THAN 70 milligrams/deciliter  hydrALAZINE Injectable 10 milliGRAM(s) IV Push every 4 hours PRN SBP >170  labetalol Injectable 20 milliGRAM(s) IV Push every 6 hours PRN 2nd line drug, Systolic blood pressure > 170  ondansetron Injectable 4 milliGRAM(s) IV Push every 6 hours PRN Nausea and/or Vomiting  oxycodone    5 mG/acetaminophen 325 mG 1 Tablet(s) Oral every 6 hours PRN Severe Pain (7 - 10)  traZODone 50 milliGRAM(s) Oral at bedtime PRN insomnia      I&O's Detail    2020 07:01  -  2020 07:00  --------------------------------------------------------  IN:    Albumin 25%: 100 mL    Albumin 25%: 200 mL    Oral Fluid: 240 mL    sodium chloride 0.9%.: 2900 mL    Solution: 500 mL    Solution: 200 mL    Solution: 500 mL  Total IN: 4640 mL    OUT:    Ileostomy: 1450 mL    Voided: 1600 mL  Total OUT: 3050 mL    Total NET: 1590 mL          Vital Signs Last 24 Hrs  T(C): 37.3 (2020 05:00), Max: 38.8 (2020 22:49)  T(F): 99.2 (2020 05:00), Max: 101.9 (2020 22:49)  HR: 87 (2020 04:28) (82 - 90)  BP: 149/58 (2020 04:28) (131/55 - 149/58)  BP(mean): --  RR: 22 (2020 04:28) (20 - 22)  SpO2: 92% (2020 04:28) (92% - 97%)    General: NAD, resting comfortably in bed  C/V: NSR  Pulm: Nonlabored breathing, no respiratory distress  Abd: soft but distended, nontender, incision with some exposure of fascial sutures  Extrem: SCDs in place    LABS:                        6.1    7.84  )-----------( 389      ( 2020 06:56 )             19.0     04-13    132<L>  |  100  |  6<L>  ----------------------------<  248<H>  3.6   |  19<L>  |  1.30    Ca    7.3<L>      2020 06:56  Phos  2.3     04-13  Mg     1.8     04-13        Urinalysis Basic - ( 2020 10:36 )    Color: Yellow / Appearance: Clear / S.010 / pH: x  Gluc: x / Ketone: NEGATIVE  / Bili: Negative / Urobili: 0.2 E.U./dL   Blood: x / Protein: Trace mg/dL / Nitrite: NEGATIVE   Leuk Esterase: NEGATIVE / RBC: < 5 /HPF / WBC < 5 /HPF   Sq Epi: x / Non Sq Epi: 0-5 /HPF / Bacteria: Present /HPF        RADIOLOGY & ADDITIONAL STUDIES:  CT Abdomen and Pelvis w/ Oral Cont and w/ IV Cont:   EXAM:  CT ABDOMEN AND PELVIS OC IC                          PROCEDURE DATE:  04/10/2020          INTERPRETATION:  CT of the abdomen and pelvis with contrast dated 4/10/2020 3:11 PM    INDICATION: Fever and abdominal wound infection. Toxic colon andcolonic ischemia, status post colectomy and enterostomy.    TECHNIQUE: CT of abdomen and pelvis was performed using oral and intravenous contrast. Axial, sagittal and coronal images were produced and reviewed.    INTRAVENOUS CONTRAST: 95 cc of Optiray 350.    PRIOR STUDIES: 3/30/2020    FINDINGS: Images of the lower chest demonstrate a small right pleural effusion. Small dependent atelectasis is noted in the right lower lobe. There are again patchy nonspecific groundglass opacities in the lung bases which could be due to congestion or atelectasis. There are couple of mildly enlarged superior diaphragmatic lymph nodes measuring up to 0.9 cm. There is circumferential wall thickening of the distal esophagus which could be due to esophagitis. Distal esophagus is fluid filled. There is a small pericardial effusion.    No focal hepatic abnormality is seen. Left hepatic lobe and caudate lobe are again somewhat prominent in size. Slight gallstones are again noted in the gallbladder neck.  There is a 5 mm hypodense lesion in the anterior aspect of the pancreatic tail which could be due to tiny cyst or fatty interdigitation. There is a 5 mm focus of fatty interdigitation in the the pancreatic head. No splenic abnormalities are seen.    The adrenal glands are unremarkable. The kidneys are normal in appearance.        No abdominal aortic aneurysm is seen. A few subcentimeter retroperitoneal lymph nodes are again noted.    Stomach is mildly distended. There is an open midline anterior abdominal wall surgical wound. The patient is status post subtotal colectomy with a Anna pouch and right lower quadrant ileostomy. There is a moderate amount of abdominal ascites, must abundant in the flanks bilaterally. There is mild enhancement of the peritoneum. Scattered areas of infiltration of the mesenteric fat are noted. There is a 4.5 cm oval loculated fluid/ascites below the pancreatic tail. No free intraperitoneal air is seen. Rectal wall is mildly thickened. There is no evidence of bowel obstruction.    Images of the pelvis demonstrate mild bladder wall thickening. Prostate is not enlarged.    Evaluation of the osseous structures demonstrates scoliosis and degenerative changes. There is again a 0.8 cm sclerotic lesion in the L5 vertebral body.      IMPRESSION:    Interval subtotal colectomy with right lower quadrant ileostomy and Anna pouch formation.    Development of moderate size ascites most abundant in the flanks bilaterally with slight peritoneal enhancement possibly representing peritonitis. No free air. No bowel obstruction.    Mildly distended stomach. Possible esophagitis.                  Thank you for the opportunity to participate in the care of this patient.        ELKE FOOTE M.D., ATTENDING RADIOLOGIST  This document has been electronically signed. Apr 10 2020  3:11PM               (04-10-20 @ 15:11)

## 2020-04-13 NOTE — PROGRESS NOTE ADULT - PROBLEM SELECTOR PLAN 1
s/p sub total colectomy and end ileostomy on 3/31  ostomy functioning  gen surg for wound dressings daily  appreciate ID recs; continue zosyn   s/p paracentesis 4/13 for ascites   adv to CLD

## 2020-04-13 NOTE — CONSULT NOTE ADULT - SUBJECTIVE AND OBJECTIVE BOX
HPI:  This is a 61 y/o M with PMH of HTN, DM complicated by neuropathy, EtOH liver cirrhosis, who presents to [facility] for fall at home.   At [facility] patient reported that he takes lactulose at home daily and has daily BM, but he has had no BM for the last 5 days.   At [facility] labs concerning for WBC 19, Cr 2.28, Lactates 8.6 (6.8 after 2L of IVF)  CT showing distended colon with small area of pneumatosis.   On arrival patient was stable, denied abdominal pain, said he has had 5 liquid/non bloody BM since coming to the ED.   Patient says he has had a Cscope in the past but does not remmeber when, he said it was normal    PAST MEDICAL & SURGICAL HISTORY:  Anemia  ETOH abuse  HLD (hyperlipidemia)  HTN (hypertension)  DM (diabetes mellitus)  No significant past surgical history      MEDICATIONS  (STANDING):  dextrose 5%. 1000 milliLiter(s) (50 mL/Hr) IV Continuous <Continuous>  dextrose 50% Injectable 12.5 Gram(s) IV Push once  dextrose 50% Injectable 25 Gram(s) IV Push once  dextrose 50% Injectable 25 Gram(s) IV Push once  heparin  Injectable 5000 Unit(s) SubCutaneous every 8 hours  insulin lispro (HumaLOG) corrective regimen sliding scale   SubCutaneous Before meals and at bedtime  lactated ringers. 1000 milliLiter(s) (150 mL/Hr) IV Continuous <Continuous>  piperacillin/tazobactam IVPB.. 3.375 Gram(s) IV Intermittent every 6 hours  sodium chloride 0.9% with potassium chloride 20 mEq/L 1000 milliLiter(s) (150 mL/Hr) IV Continuous <Continuous>  thiamine IVPB 500 milliGRAM(s) IV Intermittent every 8 hours    MEDICATIONS  (PRN):  dextrose 40% Gel 15 Gram(s) Oral once PRN Blood Glucose LESS THAN 70 milliGRAM(s)/deciliter  glucagon  Injectable 1 milliGRAM(s) IntraMuscular once PRN Glucose LESS THAN 70 milligrams/deciliter  HYDROmorphone  Injectable 0.5 milliGRAM(s) IV Push every 6 hours PRN Severe Pain (7 - 10)  oxyCODONE    IR 5 milliGRAM(s) Oral every 6 hours PRN Moderate Pain (4 - 6)      Allergies    No Known Allergies    Intolerances        SOCIAL HISTORY: says quit drinking in 2016     FAMILY HISTORY: no history of colorectal cancer       Vital Signs Last 24 Hrs  T(C): 36.3 (30 Mar 2020 16:38), Max: 36.3 (30 Mar 2020 10:40)  T(F): 97.3 (30 Mar 2020 16:38), Max: 97.4 (30 Mar 2020 10:40)  HR: 86 (30 Mar 2020 18:39) (62 - 88)  BP: 141/63 (30 Mar 2020 18:39) (85/54 - 163/75)  BP(mean): 90 (30 Mar 2020 18:39) (71 - 108)  RR: 20 (30 Mar 2020 18:39) (18 - 36)  SpO2: 98% (30 Mar 2020 18:39) (91% - 100%)    PHYSICAL EXAM  Neuro: awake and alert, no focal deficit   HEENT: normocephalic, no scleral icterus  Pulm: non labored breathing on NC, symmetric chest expansion   CV: regular rate and rhythm, palpable DP pulses b/l    GI: abdomen soft, NT, slightly distended; ex-lap incision clean without purulence or surrounding erythema; +ostomy  MSK: no c/c/e  Skin: no rash, no wound                 LABS:                        6.3    7.53  )-----------( 343      ( 2020 08:52 )             19.4     04-13    132<L>  |  100  |  6<L>  ----------------------------<  248<H>  3.6   |  19<L>  |  1.30    Ca    7.3<L>      2020 06:56  Phos  2.3     04-13  Mg     1.8     04-13        Urinalysis Basic - ( 2020 10:36 )    Color: Yellow / Appearance: Clear / S.010 / pH: x  Gluc: x / Ketone: NEGATIVE  / Bili: Negative / Urobili: 0.2 E.U./dL   Blood: x / Protein: Trace mg/dL / Nitrite: NEGATIVE   Leuk Esterase: NEGATIVE / RBC: < 5 /HPF / WBC < 5 /HPF   Sq Epi: x / Non Sq Epi: 0-5 /HPF / Bacteria: Present /HPF        MICROBIOLOGY: Culture - Surgical Swab (20 @ 22:17)    Gram Stain:   Rare Gram positive cocci in pairs  Moderate WBC's    -  Ampicillin: S <=2 Predicts results to ampicillin/sulbactam, amoxacillin-clavulanate and  piperacillin-tazobactam.    -  Ampicillin: S <=2 Predicts results to ampicillin/sulbactam, amoxacillin-clavulanate and  piperacillin-tazobactam.    -  Cefazolin: S <=4    -  Clindamycin: S <=0.25    -  Daptomycin: S 0.125    -  Erythromycin: S <=0.25    -  Levofloxacin: R >4    -  Linezolid: S 4    -  Linezolid: S 1    -  Oxacillin: S <=0.25    -  RIF- Rifampin: S <=1 Should not be used as monotherapy    -  Trimethoprim/Sulfamethoxazole: S <=0.5/9.5    -  Vancomycin: S 1    -  Vancomycin: S 0.5    -  Vancomycin: R >16    Specimen Source: .Surgical Swab Abdominal wound vac site    Culture Results:   Few Staphylococcus aureus  Few-moderate Enterococcus avium  Few Enterococcus faecalis (vancomycin resistant)  Result called to and read back byRommel BECERRA RN  2020 09:45:11  Few Candida albicans  Mixed Anaerobic Elda including:  Few-moderate Bacteroides thetaiotamcron group Beta lactamase positive  Few-moderate Clostridium innocuum Beta lactamase negative    Organism Identification: Enterococcus avium  Staphylococcus aureus  Enterococcus faecalis (vancomycin resistant)  Enterococcus faecalis (vancomycin resistant)    Organism: Enterococcus avium    Organism: Staphylococcus aureus    Organism: Enterococcus faecalis (vancomycin resistant)    Organism: Enterococcus faecalis (vancomycin resistant)    Method Type: MARU    Method Type: MARU    Method Type: MARU    Method Type: ETEST      COVID-19 PCR . (20 @ 15:15)    COVID-19 PCR: NotDetec: This test has been validated by [employer] to be accurate;  though it has not been FDA cleared/approved by the usual pathway.  As with all laboratory tests, results should be correlated with clinical  findings.    RADIOLOGY & ADDITIONAL STUDIES: < from: CT Abdomen and Pelvis w/ Oral Cont and w/ IV Cont (04.10.20 @ 15:11) >    IMPRESSION:    Interval subtotal colectomy with right lower quadrant ileostomy and Anna pouch formation.    Development of moderate size ascites most abundant in the flanks bilaterally with slight peritoneal enhancement possibly representing peritonitis. No free air. No bowel obstruction.    Mildly distended stomach. Possible esophagitis.    < end of copied text >

## 2020-04-13 NOTE — PROGRESS NOTE ADULT - PROBLEM SELECTOR PLAN 7
f/u covid labs  cont zosyn for abd infection  add zithro/plaquenil for 5 day course  f/u inflammatory markers consider solumedrol course

## 2020-04-13 NOTE — PROGRESS NOTE ADULT - ASSESSMENT
61 y/o M with PMH of HTN, DM complicated by neuropathy, EtOH liver cirrhosis, presenting w/ pneumatosis of unknown etiology, s/p sub total colectomy and end ileostomy on 3/31, c/b fever and purulent wound infection (4/9).     Neuro: Pain/Nausea control  CV: HDS but hypertensive, lisinopril 40 mg and amlodipine 10mg, Labetalol 20 mg PRN BP >170, hydralazine 10 mg prn  Pulm: CED RAMIREZ: CLD, Ileostomy pink, ostomy functioning, received ostomy teaching.  : Voids  ID: vanc (4/9- ), zosyn (4/9- )  Endo: ISS, lantus 7, synthroid  PPx: SCDs, SQH, OOBA  PT/OT: initial evaluation 4/3  Wounds: wet to dry dressing in place    IR drainage today

## 2020-04-13 NOTE — PROGRESS NOTE ADULT - ASSESSMENT
61 y/o M with PMH of HTN, DM complicated by neuropathy, EtOH liver cirrhosis, presenting w/ pneumatosis of unknown etiology, s/p sub total colectomy and end ileostomy on 3/31, c/b fever and purulent wound infection (4/9).

## 2020-04-13 NOTE — CONSULT NOTE ADULT - ASSESSMENT
61 yo male with DM, EtOH cirrhosis, presented two weeks ago with abdominal pain and distention, constipation, found to have colonic ischemia of undetermined etiology c/b toxic megacolon s/p subtotal colectomy 3/31; of note was COVID PCR negative on that day. Subsequent hospital course c/b fever; abdominal wound culture 4/9 with polymicrobial growth however wound does not presently appear infected; he is s/p diagnostic paracentesis by IR today--to f/u results including culture results which were requested this morning. d/c vancomycin and may continue Zosyn (which the patient has been receiving since 3/30) pending results. Add differential to CBC to assess for lymphopenia; would repeat testing for COVID-19 PCR (absolutely on differential diagnosis in the midst of the current outbreak).  ID Team 2

## 2020-04-13 NOTE — PROGRESS NOTE ADULT - SUBJECTIVE AND OBJECTIVE BOX
COVID TELEMETRY ACCEPTANCE NOTE      Patient discussed with:    Primary Diagnosis: COVID-19 associated pneumonia    SUBJECTIVE / HPI:  60y Male with PMH of HTN, DM complicated by neuropathy, EtOH liver cirrhosis, who presents to [facility] for fall at home. At [facility] patient reported that he takes lactulose at home daily and has daily BM, but he has had no BM for the last 5 days. At [facility] labs concerning for WBC 19, Cr 2.28, Lactates 8.6 (6.8 after 2L of IVF)CT showing distended colon with small area of pneumatosis. On arrival patient was stable, denied abdominal pain, said he has had 5 liquid/non bloody BM since coming to the ED. Patient says he has had a Cscope in the past but does not remmeber when, he said it was normal    OVERNIGHT EVENTS:    OBJECTIVE:  Vitals: Vital Signs Last 24 Hrs  T(C): 37.8 (2020 14:30), Max: 38.8 (2020 22:49)  T(F): 100.1 (2020 14:30), Max: 101.9 (2020 22:49)  HR: 84 (2020 18:47) (79 - 90)  BP: 158/85 (2020 18:47) (137/54 - 158/85)  BP(mean): --  RR: 16 (2020 18:47) (16 - 22)  SpO2: 93% (2020 18:47) (92% - 97%)  I&O:  I&O's Detail    2020 07:01  -  2020 07:00  --------------------------------------------------------  IN:    Albumin 25%: 100 mL    Albumin 25%: 200 mL    Oral Fluid: 240 mL    sodium chloride 0.9%: 2900 mL    Solution: 500 mL    Solution: 200 mL    Solution: 500 mL  Total IN: 4640 mL    OUT:    Ileostomy: 1450 mL    Voided: 1600 mL  Total OUT: 3050 mL    Total NET: 1590 mL      2020 07:01  -  2020 20:17  --------------------------------------------------------  IN:    Solution: 500 mL    Solution: 100 mL    Solution: 100 mL  Total IN: 700 mL    OUT:    Voided: 300 mL  Total OUT: 300 mL    Total NET: 400 mL              PHYSICAL EXAM: [change as needed]  General: No acute distress.   HEENT: Normocephalic, atraumatic.  Exhibits appropriate hearing and visual acuity. No palpable neck masses or hoarseness.   Psych: Affect appropriate  Neuro: AAOx3, no AMS or focal deficits.   Pulm: [Oxygen requirement] ; CTA b/l, no w/r/r.  Card: RRR, S1/S2, no m/r/g.   Vascular: Pulses 2+ in b/l R, DP, PT. No LE edema b/l.   GI: ND, NBS, non-TTP.  : +/- Guajardo  Skin: No obvious rash, abrasion, lesions. Normal skin color and turgor.   Lines: +/- Central Venous Access [Right / Left IJV - Dates:     ]; +/- Arterial Line (Dates)    LABS:                         6.3    7.53  )-----------( 343      ( 2020 08:52 )             19.4   04-13    132<L>  |  100  |  6<L>  ----------------------------<  248<H>  3.6   |  19<L>  |  1.30    Ca    7.3<L>      2020 06:56  Phos  2.3     04-13  Mg     1.8     -13    Urinalysis Basic - ( 2020 10:36 )    Color: Yellow / Appearance: Clear / S.010 / pH: x  Gluc: x / Ketone: NEGATIVE  / Bili: Negative / Urobili: 0.2 E.U./dL   Blood: x / Protein: Trace mg/dL / Nitrite: NEGATIVE   Leuk Esterase: NEGATIVE / RBC: < 5 /HPF / WBC < 5 /HPF   Sq Epi: x / Non Sq Epi: 0-5 /HPF / Bacteria: Present /HPF      CAPILLARY BLOOD GLUCOSE      POCT Blood Glucose.: 166 mg/dL (2020 16:38)    MEDICATIONS  (STANDING):  amLODIPine   Tablet 10 milliGRAM(s) Oral daily  aspirin enteric coated 81 milliGRAM(s) Oral daily  atorvastatin 10 milliGRAM(s) Oral at bedtime  dextrose 5%. 1000 milliLiter(s) (50 mL/Hr) IV Continuous <Continuous>  dextrose 50% Injectable 12.5 Gram(s) IV Push once  dextrose 50% Injectable 25 Gram(s) IV Push once  dextrose 50% Injectable 25 Gram(s) IV Push once  gabapentin 100 milliGRAM(s) Oral every 6 hours  insulin glargine Injectable (LANTUS) 10 Unit(s) SubCutaneous at bedtime  insulin lispro (HumaLOG) corrective regimen sliding scale   SubCutaneous Before meals and at bedtime  insulin lispro Injectable (HumaLOG) 4 Unit(s) SubCutaneous three times a day before meals  levothyroxine Injectable 40 MICROGram(s) IV Push at bedtime  loperamide 2 milliGRAM(s) Oral every 6 hours  piperacillin/tazobactam IVPB.. 3.375 Gram(s) IV Intermittent every 6 hours    MEDICATIONS  (PRN):  acetaminophen   Tablet .. 650 milliGRAM(s) Oral every 6 hours PRN Temp greater or equal to 38C (100.4F), Mild Pain (1 - 3)  dextrose 40% Gel 15 Gram(s) Oral once PRN Blood Glucose LESS THAN 70 milliGRAM(s)/deciliter  glucagon  Injectable 1 milliGRAM(s) IntraMuscular once PRN Glucose LESS THAN 70 milligrams/deciliter  hydrALAZINE Injectable 10 milliGRAM(s) IV Push every 4 hours PRN SBP >170  labetalol Injectable 20 milliGRAM(s) IV Push every 6 hours PRN 2nd line drug, Systolic blood pressure > 170  ondansetron Injectable 4 milliGRAM(s) IV Push every 6 hours PRN Nausea and/or Vomiting  oxycodone    5 mG/acetaminophen 325 mG 1 Tablet(s) Oral every 6 hours PRN Severe Pain (7 - 10)  traZODone 50 milliGRAM(s) Oral at bedtime PRN insomnia      RADIOLOGY/OTHER STUDIES: COVID TELEMETRY ACCEPTANCE NOTE      Patient discussed with: gen surg    Primary Diagnosis: COVID-19 associated pneumonia    SUBJECTIVE / HPI:  60y Male with PMH of HTN, DM complicated by neuropathy, EtOH liver cirrhosis, who presents to [facility] for fall at home. At [facility] patient reported that he takes lactulose at home daily and has daily BM, but he has had no BM for the last 5 days. At [facility] labs concerning for WBC 19, Cr 2.28, Lactates 8.6 (6.8 after 2L of IVF)CT showing distended colon with small area of pneumatosis. On arrival patient was stable, denied abdominal pain, said he has had 5 liquid/non bloody BM since coming to the ED. Patient says he has had a Cscope in the past but does not remmeber when, he said it was normal    24 hour events:  1 unit prbcs trx  paracentesis performed, drained 240cc sent for culture  ID consult recommended discontinuing vancomycin  patient transferred to tele 9 [facility]    OBJECTIVE:  Vitals: Vital Signs Last 24 Hrs  T(C): 37.8 (2020 14:30), Max: 38.8 (2020 22:49)  T(F): 100.1 (2020 14:30), Max: 101.9 (2020 22:49)  HR: 84 (2020 18:47) (79 - 90)  BP: 158/85 (2020 18:47) (137/54 - 158/85)  BP(mean): --  RR: 16 (2020 18:47) (16 - 22)  SpO2: 93% (2020 18:47) (92% - 97%)  I&O:  I&O's Detail    2020 07:01  -  2020 07:00  --------------------------------------------------------  IN:    Albumin 25%: 100 mL    Albumin 25%: 200 mL    Oral Fluid: 240 mL    sodium chloride 0.9%: 2900 mL    Solution: 500 mL    Solution: 200 mL    Solution: 500 mL  Total IN: 4640 mL    OUT:    Ileostomy: 1450 mL    Voided: 1600 mL  Total OUT: 3050 mL    Total NET: 1590 mL      2020 07:01  -  2020 20:17  --------------------------------------------------------  IN:    Solution: 500 mL    Solution: 100 mL    Solution: 100 mL  Total IN: 700 mL    OUT:    Voided: 300 mL  Total OUT: 300 mL    Total NET: 400 mL      PHYSICAL EXAM:  General: No acute distress.   HEENT: Normocephalic, atraumatic.  Exhibits appropriate hearing and visual acuity. No palpable neck masses or hoarseness.   Psych: Affect appropriate  Neuro: AAOx3, no AMS or focal deficits.   Pulm: RA ; CTA b/l, no w/r/r.  Card: RRR, S1/S2, no m/r/g.   abd: soft but distended, nontender, incision with some exposure of fascial sutures  Vascular: Pulses 2+ in b/l R, DP, PT. No LE edema b/l.   GI: ND, NBS, non-TTP.  : [place] cath in place  Skin: No obvious rash, abrasion, lesions. Normal skin color and turgor.   Lines: PIVs    LABS:                         6.3    7.53  )-----------( 343      ( 2020 08:52 )             19.4   04-13    132<L>  |  100  |  6<L>  ----------------------------<  248<H>  3.6   |  19<L>  |  1.30    Ca    7.3<L>      2020 06:56  Phos  2.3     -  Mg     1.8     -13    Urinalysis Basic - ( 2020 10:36 )    Color: Yellow / Appearance: Clear / S.010 / pH: x  Gluc: x / Ketone: NEGATIVE  / Bili: Negative / Urobili: 0.2 E.U./dL   Blood: x / Protein: Trace mg/dL / Nitrite: NEGATIVE   Leuk Esterase: NEGATIVE / RBC: < 5 /HPF / WBC < 5 /HPF   Sq Epi: x / Non Sq Epi: 0-5 /HPF / Bacteria: Present /HPF      CAPILLARY BLOOD GLUCOSE      POCT Blood Glucose.: 166 mg/dL (2020 16:38)    MEDICATIONS  (STANDING):  amLODIPine   Tablet 10 milliGRAM(s) Oral daily  aspirin enteric coated 81 milliGRAM(s) Oral daily  atorvastatin 10 milliGRAM(s) Oral at bedtime  dextrose 5%. 1000 milliLiter(s) (50 mL/Hr) IV Continuous <Continuous>  dextrose 50% Injectable 12.5 Gram(s) IV Push once  dextrose 50% Injectable 25 Gram(s) IV Push once  dextrose 50% Injectable 25 Gram(s) IV Push once  gabapentin 100 milliGRAM(s) Oral every 6 hours  insulin glargine Injectable (LANTUS) 10 Unit(s) SubCutaneous at bedtime  insulin lispro (HumaLOG) corrective regimen sliding scale   SubCutaneous Before meals and at bedtime  insulin lispro Injectable (HumaLOG) 4 Unit(s) SubCutaneous three times a day before meals  levothyroxine Injectable 40 MICROGram(s) IV Push at bedtime  loperamide 2 milliGRAM(s) Oral every 6 hours  piperacillin/tazobactam IVPB.. 3.375 Gram(s) IV Intermittent every 6 hours    MEDICATIONS  (PRN):  acetaminophen   Tablet .. 650 milliGRAM(s) Oral every 6 hours PRN Temp greater or equal to 38C (100.4F), Mild Pain (1 - 3)  dextrose 40% Gel 15 Gram(s) Oral once PRN Blood Glucose LESS THAN 70 milliGRAM(s)/deciliter  glucagon  Injectable 1 milliGRAM(s) IntraMuscular once PRN Glucose LESS THAN 70 milligrams/deciliter  hydrALAZINE Injectable 10 milliGRAM(s) IV Push every 4 hours PRN SBP >170  labetalol Injectable 20 milliGRAM(s) IV Push every 6 hours PRN 2nd line drug, Systolic blood pressure > 170  ondansetron Injectable 4 milliGRAM(s) IV Push every 6 hours PRN Nausea and/or Vomiting  oxycodone    5 mG/acetaminophen 325 mG 1 Tablet(s) Oral every 6 hours PRN Severe Pain (7 - 10)  traZODone 50 milliGRAM(s) Oral at bedtime PRN insomnia      RADIOLOGY/OTHER STUDIES:  < from: CT Abdomen and Pelvis w/ Oral Cont and w/ IV Cont (04.10.20 @ 15:11) >  Interval subtotal colectomy with right lower quadrant ileostomy and Anna pouch formation.    Development of moderate size ascites most abundant in the flanks bilaterally with slight peritoneal enhancement possibly representing peritonitis. No free air. No bowel obstruction.    Mildly distended stomach. Possible esophagitis.    < end of copied text >

## 2020-04-13 NOTE — CHART NOTE - NSCHARTNOTEFT_GEN_A_CORE
Admitting Diagnosis:   Patient is a 60y old  Male who presents with a chief complaint of toxic megacolon (04 Apr 2020 14:38)      PAST MEDICAL & SURGICAL HISTORY:  Anemia  ETOH abuse  HLD (hyperlipidemia)  HTN (hypertension)  DM (diabetes mellitus)  No significant past surgical history      Current Nutrition Order: CLD, Cst Cho w Snack       PO Intake: Good (%) [   ]  Fair (50-75%) [  x ] Poor (<25%) [  x ]    GI Issues: ileostomy, distended abd + nausea, no v/c/d    Pain: abdominal discomfort noted     Skin Integrity: ernee score 20     Labs:   04-13    132<L>  |  100  |  6<L>  ----------------------------<  248<H>  3.6   |  19<L>  |  1.30    Ca    7.3<L>      13 Apr 2020 06:56  Phos  2.3     04-13  Mg     1.8     04-13      CAPILLARY BLOOD GLUCOSE      POCT Blood Glucose.: 232 mg/dL (13 Apr 2020 07:42)  POCT Blood Glucose.: 275 mg/dL (13 Apr 2020 06:35)  POCT Blood Glucose.: 157 mg/dL (13 Apr 2020 03:37)  POCT Blood Glucose.: 122 mg/dL (12 Apr 2020 21:27)  POCT Blood Glucose.: 414 mg/dL (12 Apr 2020 18:37)  POCT Blood Glucose.: 509 mg/dL (12 Apr 2020 16:52)  POCT Blood Glucose.: 454 mg/dL (12 Apr 2020 16:50)  POCT Blood Glucose.: 484 mg/dL (12 Apr 2020 16:22)      Medications:  MEDICATIONS  (STANDING):  amLODIPine   Tablet 10 milliGRAM(s) Oral daily  aspirin enteric coated 81 milliGRAM(s) Oral daily  atorvastatin 10 milliGRAM(s) Oral at bedtime  dextrose 5%. 1000 milliLiter(s) (50 mL/Hr) IV Continuous <Continuous>  dextrose 50% Injectable 12.5 Gram(s) IV Push once  dextrose 50% Injectable 25 Gram(s) IV Push once  dextrose 50% Injectable 25 Gram(s) IV Push once  gabapentin 100 milliGRAM(s) Oral every 6 hours  insulin glargine Injectable (LANTUS) 10 Unit(s) SubCutaneous at bedtime  insulin lispro (HumaLOG) corrective regimen sliding scale   SubCutaneous Before meals and at bedtime  insulin lispro Injectable (HumaLOG) 4 Unit(s) SubCutaneous three times a day before meals  levothyroxine Injectable 40 MICROGram(s) IV Push at bedtime  loperamide 2 milliGRAM(s) Oral every 6 hours  magnesium sulfate  IVPB 1 Gram(s) IV Intermittent once  piperacillin/tazobactam IVPB.. 3.375 Gram(s) IV Intermittent every 6 hours  potassium phosphate IVPB 15 milliMole(s) IV Intermittent once  sodium chloride 0.9%. 1000 milliLiter(s) (100 mL/Hr) IV Continuous <Continuous>  vancomycin  IVPB 1500 milliGRAM(s) IV Intermittent every 12 hours    MEDICATIONS  (PRN):  acetaminophen   Tablet .. 650 milliGRAM(s) Oral every 6 hours PRN Temp greater or equal to 38C (100.4F), Mild Pain (1 - 3)  dextrose 40% Gel 15 Gram(s) Oral once PRN Blood Glucose LESS THAN 70 milliGRAM(s)/deciliter  glucagon  Injectable 1 milliGRAM(s) IntraMuscular once PRN Glucose LESS THAN 70 milligrams/deciliter  hydrALAZINE Injectable 10 milliGRAM(s) IV Push every 4 hours PRN SBP >170  labetalol Injectable 20 milliGRAM(s) IV Push every 6 hours PRN 2nd line drug, Systolic blood pressure > 170  ondansetron Injectable 4 milliGRAM(s) IV Push every 6 hours PRN Nausea and/or Vomiting  oxycodone    5 mG/acetaminophen 325 mG 1 Tablet(s) Oral every 6 hours PRN Severe Pain (7 - 10)  traZODone 50 milliGRAM(s) Oral at bedtime PRN insomnia      Weight: 90.7kg (3/31)     Weight Change: No updated weights. Please obtain current weight and trend bi-weekly weights for further assessment.    Nutrition Focused Physical Exam: Completed [   ]  Not Pertinent [   ]- n/a     Estimated energy needs:   Ht (3/31): 170.2cm, Wt (3/31): 90.7kg, IBW: 67.3kg +/-10%, %IBW: 134%, BMI: 31.3   IBW used to calculate energy needs due to pt's current body weight exceeding 120% of IBW. Needs adjusted for post-op/ infection   1550-0359 kcal (25-30 kcal/kg)  81-94gm protein (1.2-1.4gm protein/kg)  Fluid needs per team    Subjective:   59 y/o M with PMH of HTN, DM complicated by neuropathy, EtOH liver cirrhosis, s/p subtotal colectomy and end ileostomy on 3/31 for toxic megacolon, Pt successfully extubated on 3/31.  Pt states appetite good PTA, lives at a shelter which provides pt with meals. Pt limits bread, denies other dietary restrictions, denies food allergies. Pt states he weighed 175-180lb ~6 months ago and now reports weighing around 200lb, indicating a 20-25lb weight gain, pt states he has been walking less. Pt with fever 4/9 CT A/P to evaluate wound for possible intraabdominal infection vs wound dehiscence. Noted to have purulent wound infection. Now on CLD, fair intake noted. Low fiber diet ed discussed prior assessment. Continues to be managed/ monitored at this time. Please see below for full nutritional recommendations. RD to monitor and f/u per protocol.    Previous Nutrition Diagnosis: Increased Nutrient Needs (protein) RT increased nutrient demand AEB post-op    Active [ x  ]  Resolved [   ]    If resolved, new PES:     Goal: Meet >75%EER via most appropriate route with good tolerance within 24-48h     Recommendations:  1. Recommend diet advancement as medically appropriate: CSTCHO, CLD -> CSTCHO, DASH/TLC, low fiber  >>monitor need for ONS as diet advances  2. Monitor labs closely (BM, lytes, [id number])  3. Obtain current weight and trend bi-weekly weights for further assessment  4. Recommend add anti-emetic if nausea continues    Education: encouraged intake     Risk Level: High [   ] Moderate [ x  ] Low [   ]

## 2020-04-14 LAB
ALBUMIN SERPL ELPH-MCNC: 2.2 G/DL — LOW (ref 3.3–5)
ALBUMIN SERPL ELPH-MCNC: 2.3 G/DL — LOW (ref 3.3–5)
ALP SERPL-CCNC: 105 U/L — SIGNIFICANT CHANGE UP (ref 40–120)
ALP SERPL-CCNC: 115 U/L — SIGNIFICANT CHANGE UP (ref 40–120)
ALT FLD-CCNC: 31 U/L — SIGNIFICANT CHANGE UP (ref 10–45)
ALT FLD-CCNC: 31 U/L — SIGNIFICANT CHANGE UP (ref 10–45)
ANION GAP SERPL CALC-SCNC: 13 MMOL/L — SIGNIFICANT CHANGE UP (ref 5–17)
ANION GAP SERPL CALC-SCNC: 9 MMOL/L — SIGNIFICANT CHANGE UP (ref 5–17)
APTT BLD: 28.7 SEC — SIGNIFICANT CHANGE UP (ref 27.5–36.3)
AST SERPL-CCNC: 30 U/L — SIGNIFICANT CHANGE UP (ref 10–40)
AST SERPL-CCNC: 37 U/L — SIGNIFICANT CHANGE UP (ref 10–40)
BASOPHILS # BLD AUTO: 0.02 K/UL — SIGNIFICANT CHANGE UP (ref 0–0.2)
BASOPHILS # BLD AUTO: 0.02 K/UL — SIGNIFICANT CHANGE UP (ref 0–0.2)
BASOPHILS NFR BLD AUTO: 0.2 % — SIGNIFICANT CHANGE UP (ref 0–2)
BASOPHILS NFR BLD AUTO: 0.2 % — SIGNIFICANT CHANGE UP (ref 0–2)
BILIRUB SERPL-MCNC: 0.4 MG/DL — SIGNIFICANT CHANGE UP (ref 0.2–1.2)
BILIRUB SERPL-MCNC: 0.4 MG/DL — SIGNIFICANT CHANGE UP (ref 0.2–1.2)
BUN SERPL-MCNC: 12 MG/DL — SIGNIFICANT CHANGE UP (ref 7–23)
BUN SERPL-MCNC: 15 MG/DL — SIGNIFICANT CHANGE UP (ref 7–23)
CALCIUM SERPL-MCNC: 7.1 MG/DL — LOW (ref 8.4–10.5)
CALCIUM SERPL-MCNC: 7.2 MG/DL — LOW (ref 8.4–10.5)
CHLORIDE SERPL-SCNC: 102 MMOL/L — SIGNIFICANT CHANGE UP (ref 96–108)
CHLORIDE SERPL-SCNC: 103 MMOL/L — SIGNIFICANT CHANGE UP (ref 96–108)
CO2 SERPL-SCNC: 17 MMOL/L — LOW (ref 22–31)
CO2 SERPL-SCNC: 17 MMOL/L — LOW (ref 22–31)
CREAT SERPL-MCNC: 2.59 MG/DL — HIGH (ref 0.5–1.3)
CREAT SERPL-MCNC: 3.08 MG/DL — HIGH (ref 0.5–1.3)
CRP SERPL-MCNC: 16.62 MG/DL — HIGH (ref 0–0.4)
CULTURE RESULTS: NO GROWTH — SIGNIFICANT CHANGE UP
EOSINOPHIL # BLD AUTO: 0.07 K/UL — SIGNIFICANT CHANGE UP (ref 0–0.5)
EOSINOPHIL # BLD AUTO: 0.1 K/UL — SIGNIFICANT CHANGE UP (ref 0–0.5)
EOSINOPHIL NFR BLD AUTO: 0.6 % — SIGNIFICANT CHANGE UP (ref 0–6)
EOSINOPHIL NFR BLD AUTO: 1 % — SIGNIFICANT CHANGE UP (ref 0–6)
ERYTHROCYTE [SEDIMENTATION RATE] IN BLOOD: >130 MM/HR — HIGH
FERRITIN SERPL-MCNC: 418 NG/ML — HIGH (ref 30–400)
GLUCOSE BLDC GLUCOMTR-MCNC: 128 MG/DL — HIGH (ref 70–99)
GLUCOSE BLDC GLUCOMTR-MCNC: 131 MG/DL — HIGH (ref 70–99)
GLUCOSE BLDC GLUCOMTR-MCNC: 181 MG/DL — HIGH (ref 70–99)
GLUCOSE BLDC GLUCOMTR-MCNC: 183 MG/DL — HIGH (ref 70–99)
GLUCOSE BLDC GLUCOMTR-MCNC: 192 MG/DL — HIGH (ref 70–99)
GLUCOSE SERPL-MCNC: 170 MG/DL — HIGH (ref 70–99)
GLUCOSE SERPL-MCNC: 195 MG/DL — HIGH (ref 70–99)
HCT VFR BLD CALC: 21.9 % — LOW (ref 39–50)
HCT VFR BLD CALC: 22.8 % — LOW (ref 39–50)
HGB BLD-MCNC: 7.1 G/DL — LOW (ref 13–17)
HGB BLD-MCNC: 7.6 G/DL — LOW (ref 13–17)
IMM GRANULOCYTES NFR BLD AUTO: 1.7 % — HIGH (ref 0–1.5)
IMM GRANULOCYTES NFR BLD AUTO: 1.8 % — HIGH (ref 0–1.5)
INR BLD: 1.3 — HIGH (ref 0.88–1.16)
LDH SERPL L TO P-CCNC: 234 U/L — SIGNIFICANT CHANGE UP (ref 50–242)
LYMPHOCYTES # BLD AUTO: 0.79 K/UL — LOW (ref 1–3.3)
LYMPHOCYTES # BLD AUTO: 0.82 K/UL — LOW (ref 1–3.3)
LYMPHOCYTES # BLD AUTO: 7.2 % — LOW (ref 13–44)
LYMPHOCYTES # BLD AUTO: 7.8 % — LOW (ref 13–44)
MAGNESIUM SERPL-MCNC: 1.9 MG/DL — SIGNIFICANT CHANGE UP (ref 1.6–2.6)
MAGNESIUM SERPL-MCNC: 2 MG/DL — SIGNIFICANT CHANGE UP (ref 1.6–2.6)
MCHC RBC-ENTMCNC: 28.6 PG — SIGNIFICANT CHANGE UP (ref 27–34)
MCHC RBC-ENTMCNC: 28.8 PG — SIGNIFICANT CHANGE UP (ref 27–34)
MCHC RBC-ENTMCNC: 32.4 GM/DL — SIGNIFICANT CHANGE UP (ref 32–36)
MCHC RBC-ENTMCNC: 33.3 GM/DL — SIGNIFICANT CHANGE UP (ref 32–36)
MCV RBC AUTO: 86.4 FL — SIGNIFICANT CHANGE UP (ref 80–100)
MCV RBC AUTO: 88.3 FL — SIGNIFICANT CHANGE UP (ref 80–100)
MONOCYTES # BLD AUTO: 1.1 K/UL — HIGH (ref 0–0.9)
MONOCYTES # BLD AUTO: 1.2 K/UL — HIGH (ref 0–0.9)
MONOCYTES NFR BLD AUTO: 10.5 % — SIGNIFICANT CHANGE UP (ref 2–14)
MONOCYTES NFR BLD AUTO: 10.9 % — SIGNIFICANT CHANGE UP (ref 2–14)
NEUTROPHILS # BLD AUTO: 8.27 K/UL — HIGH (ref 1.8–7.4)
NEUTROPHILS # BLD AUTO: 8.73 K/UL — HIGH (ref 1.8–7.4)
NEUTROPHILS NFR BLD AUTO: 78.8 % — HIGH (ref 43–77)
NEUTROPHILS NFR BLD AUTO: 79.3 % — HIGH (ref 43–77)
NRBC # BLD: 0 /100 WBCS — SIGNIFICANT CHANGE UP (ref 0–0)
NRBC # BLD: 0 /100 WBCS — SIGNIFICANT CHANGE UP (ref 0–0)
PHOSPHATE SERPL-MCNC: 4 MG/DL — SIGNIFICANT CHANGE UP (ref 2.5–4.5)
PHOSPHATE SERPL-MCNC: 4.1 MG/DL — SIGNIFICANT CHANGE UP (ref 2.5–4.5)
PLATELET # BLD AUTO: 435 K/UL — HIGH (ref 150–400)
PLATELET # BLD AUTO: 455 K/UL — HIGH (ref 150–400)
POTASSIUM SERPL-MCNC: 3.8 MMOL/L — SIGNIFICANT CHANGE UP (ref 3.5–5.3)
POTASSIUM SERPL-MCNC: 4 MMOL/L — SIGNIFICANT CHANGE UP (ref 3.5–5.3)
POTASSIUM SERPL-SCNC: 3.8 MMOL/L — SIGNIFICANT CHANGE UP (ref 3.5–5.3)
POTASSIUM SERPL-SCNC: 4 MMOL/L — SIGNIFICANT CHANGE UP (ref 3.5–5.3)
PROCALCITONIN SERPL-MCNC: 0.49 NG/ML — HIGH (ref 0.02–0.1)
PROT SERPL-MCNC: 5.9 G/DL — LOW (ref 6–8.3)
PROT SERPL-MCNC: 6 G/DL — SIGNIFICANT CHANGE UP (ref 6–8.3)
PROTHROM AB SERPL-ACNC: 14.9 SEC — HIGH (ref 10–12.9)
RBC # BLD: 2.48 M/UL — LOW (ref 4.2–5.8)
RBC # BLD: 2.64 M/UL — LOW (ref 4.2–5.8)
RBC # FLD: 14.6 % — HIGH (ref 10.3–14.5)
RBC # FLD: 14.8 % — HIGH (ref 10.3–14.5)
SODIUM SERPL-SCNC: 129 MMOL/L — LOW (ref 135–145)
SODIUM SERPL-SCNC: 132 MMOL/L — LOW (ref 135–145)
SPECIMEN SOURCE: SIGNIFICANT CHANGE UP
TSH SERPL-MCNC: 2.81 UIU/ML — SIGNIFICANT CHANGE UP (ref 0.35–4.94)
TSH SERPL-MCNC: 2.88 UIU/ML — SIGNIFICANT CHANGE UP (ref 0.35–4.94)
WBC # BLD: 10.49 K/UL — SIGNIFICANT CHANGE UP (ref 3.8–10.5)
WBC # BLD: 11.01 K/UL — HIGH (ref 3.8–10.5)
WBC # FLD AUTO: 10.49 K/UL — SIGNIFICANT CHANGE UP (ref 3.8–10.5)
WBC # FLD AUTO: 11.01 K/UL — HIGH (ref 3.8–10.5)

## 2020-04-14 PROCEDURE — 99232 SBSQ HOSP IP/OBS MODERATE 35: CPT

## 2020-04-14 PROCEDURE — 99291 CRITICAL CARE FIRST HOUR: CPT

## 2020-04-14 PROCEDURE — 99233 SBSQ HOSP IP/OBS HIGH 50: CPT

## 2020-04-14 RX ORDER — SODIUM CHLORIDE 9 MG/ML
200 INJECTION INTRAMUSCULAR; INTRAVENOUS; SUBCUTANEOUS
Refills: 0 | Status: DISCONTINUED | OUTPATIENT
Start: 2020-04-14 | End: 2020-04-14

## 2020-04-14 RX ORDER — INSULIN GLARGINE 100 [IU]/ML
16 INJECTION, SOLUTION SUBCUTANEOUS AT BEDTIME
Refills: 0 | Status: DISCONTINUED | OUTPATIENT
Start: 2020-04-14 | End: 2020-04-14

## 2020-04-14 RX ORDER — SODIUM CHLORIDE 9 MG/ML
1000 INJECTION INTRAMUSCULAR; INTRAVENOUS; SUBCUTANEOUS
Refills: 0 | Status: DISCONTINUED | OUTPATIENT
Start: 2020-04-14 | End: 2020-04-14

## 2020-04-14 RX ORDER — PIPERACILLIN AND TAZOBACTAM 4; .5 G/20ML; G/20ML
2.25 INJECTION, POWDER, LYOPHILIZED, FOR SOLUTION INTRAVENOUS EVERY 8 HOURS
Refills: 0 | Status: DISCONTINUED | OUTPATIENT
Start: 2020-04-14 | End: 2020-04-14

## 2020-04-14 RX ORDER — INSULIN LISPRO 100/ML
6 VIAL (ML) SUBCUTANEOUS
Refills: 0 | Status: DISCONTINUED | OUTPATIENT
Start: 2020-04-14 | End: 2020-04-14

## 2020-04-14 RX ORDER — POTASSIUM CHLORIDE 20 MEQ
20 PACKET (EA) ORAL ONCE
Refills: 0 | Status: COMPLETED | OUTPATIENT
Start: 2020-04-14 | End: 2020-04-14

## 2020-04-14 RX ORDER — PANTOPRAZOLE SODIUM 20 MG/1
40 TABLET, DELAYED RELEASE ORAL
Refills: 0 | Status: DISCONTINUED | OUTPATIENT
Start: 2020-04-14 | End: 2020-04-16

## 2020-04-14 RX ORDER — INSULIN GLARGINE 100 [IU]/ML
12 INJECTION, SOLUTION SUBCUTANEOUS AT BEDTIME
Refills: 0 | Status: DISCONTINUED | OUTPATIENT
Start: 2020-04-14 | End: 2020-04-15

## 2020-04-14 RX ORDER — INSULIN LISPRO 100/ML
8 VIAL (ML) SUBCUTANEOUS
Refills: 0 | Status: DISCONTINUED | OUTPATIENT
Start: 2020-04-14 | End: 2020-04-15

## 2020-04-14 RX ORDER — OXYCODONE AND ACETAMINOPHEN 5; 325 MG/1; MG/1
1 TABLET ORAL EVERY 6 HOURS
Refills: 0 | Status: DISCONTINUED | OUTPATIENT
Start: 2020-04-14 | End: 2020-04-16

## 2020-04-14 RX ADMIN — AMLODIPINE BESYLATE 10 MILLIGRAM(S): 2.5 TABLET ORAL at 05:53

## 2020-04-14 RX ADMIN — Medication 2: at 08:43

## 2020-04-14 RX ADMIN — GABAPENTIN 100 MILLIGRAM(S): 400 CAPSULE ORAL at 11:42

## 2020-04-14 RX ADMIN — Medication 6 UNIT(S): at 11:41

## 2020-04-14 RX ADMIN — AZITHROMYCIN 250 MILLIGRAM(S): 500 TABLET, FILM COATED ORAL at 20:47

## 2020-04-14 RX ADMIN — GABAPENTIN 100 MILLIGRAM(S): 400 CAPSULE ORAL at 22:44

## 2020-04-14 RX ADMIN — Medication 2 MILLIGRAM(S): at 22:44

## 2020-04-14 RX ADMIN — Medication 6 UNIT(S): at 08:43

## 2020-04-14 RX ADMIN — PANTOPRAZOLE SODIUM 40 MILLIGRAM(S): 20 TABLET, DELAYED RELEASE ORAL at 17:45

## 2020-04-14 RX ADMIN — AZITHROMYCIN 255 MILLIGRAM(S): 500 TABLET, FILM COATED ORAL at 00:57

## 2020-04-14 RX ADMIN — Medication 650 MILLIGRAM(S): at 00:02

## 2020-04-14 RX ADMIN — Medication 40 MICROGRAM(S): at 22:43

## 2020-04-14 RX ADMIN — Medication 50 MILLIGRAM(S): at 22:44

## 2020-04-14 RX ADMIN — SODIUM CHLORIDE 75 MILLILITER(S): 9 INJECTION INTRAMUSCULAR; INTRAVENOUS; SUBCUTANEOUS at 02:45

## 2020-04-14 RX ADMIN — PIPERACILLIN AND TAZOBACTAM 200 GRAM(S): 4; .5 INJECTION, POWDER, LYOPHILIZED, FOR SOLUTION INTRAVENOUS at 05:53

## 2020-04-14 RX ADMIN — Medication 2: at 11:41

## 2020-04-14 RX ADMIN — Medication 650 MILLIGRAM(S): at 22:44

## 2020-04-14 RX ADMIN — Medication 2 MILLIGRAM(S): at 05:53

## 2020-04-14 RX ADMIN — Medication 50 MILLIGRAM(S): at 00:03

## 2020-04-14 RX ADMIN — PIPERACILLIN AND TAZOBACTAM 200 GRAM(S): 4; .5 INJECTION, POWDER, LYOPHILIZED, FOR SOLUTION INTRAVENOUS at 11:43

## 2020-04-14 RX ADMIN — Medication 81 MILLIGRAM(S): at 11:42

## 2020-04-14 RX ADMIN — Medication 2 MILLIGRAM(S): at 17:44

## 2020-04-14 RX ADMIN — INSULIN GLARGINE 12 UNIT(S): 100 INJECTION, SOLUTION SUBCUTANEOUS at 22:42

## 2020-04-14 RX ADMIN — Medication 400 MILLIGRAM(S): at 20:46

## 2020-04-14 RX ADMIN — Medication 2 MILLIGRAM(S): at 11:42

## 2020-04-14 RX ADMIN — GABAPENTIN 100 MILLIGRAM(S): 400 CAPSULE ORAL at 17:44

## 2020-04-14 RX ADMIN — Medication 20 MILLIEQUIVALENT(S): at 02:51

## 2020-04-14 RX ADMIN — Medication 8 UNIT(S): at 17:44

## 2020-04-14 RX ADMIN — GABAPENTIN 100 MILLIGRAM(S): 400 CAPSULE ORAL at 05:53

## 2020-04-14 RX ADMIN — ATORVASTATIN CALCIUM 10 MILLIGRAM(S): 80 TABLET, FILM COATED ORAL at 20:47

## 2020-04-14 NOTE — PROGRESS NOTE ADULT - SUBJECTIVE AND OBJECTIVE BOX
INTERVAL HPI/OVERNIGHT EVENTS: Overnight, pt tested positive for COVID. Hgb improved to 7.6. This AM pt seen and examined by chief resident at bedside. Pt states he feels depressed about current condition. Denies n/v/cp/sob.     STATUS POST:  ex lap, subtotal colectomy/IR paracentesis    POST OPERATIVE DAY #:     MEDICATIONS  (STANDING):  amLODIPine   Tablet 10 milliGRAM(s) Oral daily  aspirin enteric coated 81 milliGRAM(s) Oral daily  atorvastatin 10 milliGRAM(s) Oral at bedtime  azithromycin  IVPB 250 milliGRAM(s) IV Intermittent every 24 hours  azithromycin  IVPB      dextrose 5%. 1000 milliLiter(s) (50 mL/Hr) IV Continuous <Continuous>  dextrose 50% Injectable 12.5 Gram(s) IV Push once  dextrose 50% Injectable 25 Gram(s) IV Push once  dextrose 50% Injectable 25 Gram(s) IV Push once  gabapentin 100 milliGRAM(s) Oral every 6 hours  hydroxychloroquine 400 milliGRAM(s) Oral every 24 hours  hydroxychloroquine   Oral   insulin glargine Injectable (LANTUS) 16 Unit(s) SubCutaneous at bedtime  insulin lispro (HumaLOG) corrective regimen sliding scale   SubCutaneous Before meals and at bedtime  insulin lispro Injectable (HumaLOG) 6 Unit(s) SubCutaneous three times a day before meals  levothyroxine Injectable 40 MICROGram(s) IV Push at bedtime  loperamide 2 milliGRAM(s) Oral every 6 hours  piperacillin/tazobactam IVPB.. 3.375 Gram(s) IV Intermittent every 6 hours  sodium chloride 0.9%. 1000 milliLiter(s) (75 mL/Hr) IV Continuous <Continuous>    MEDICATIONS  (PRN):  acetaminophen   Tablet .. 650 milliGRAM(s) Oral every 6 hours PRN Temp greater or equal to 38C (100.4F), Mild Pain (1 - 3)  dextrose 40% Gel 15 Gram(s) Oral once PRN Blood Glucose LESS THAN 70 milliGRAM(s)/deciliter  glucagon  Injectable 1 milliGRAM(s) IntraMuscular once PRN Glucose LESS THAN 70 milligrams/deciliter  hydrALAZINE Injectable 10 milliGRAM(s) IV Push every 4 hours PRN SBP >170  labetalol Injectable 20 milliGRAM(s) IV Push every 6 hours PRN 2nd line drug, Systolic blood pressure > 170  ondansetron Injectable 4 milliGRAM(s) IV Push every 6 hours PRN Nausea and/or Vomiting  oxycodone    5 mG/acetaminophen 325 mG 1 Tablet(s) Oral every 6 hours PRN Severe Pain (7 - 10)  traZODone 50 milliGRAM(s) Oral at bedtime PRN insomnia      Vital Signs Last 24 Hrs  T(C): 36.8 (2020 06:01), Max: 37.9 (2020 11:35)  T(F): 98.3 (2020 06:01), Max: 100.3 (2020 21:07)  HR: 80 (2020 06:01) (79 - 89)  BP: 131/63 (2020 06:01) (131/63 - 158/85)  BP(mean): --  RR: 20 (2020 06:01) (16 - 22)  SpO2: 94% (2020 06:01) (92% - 98%)    PHYSICAL EXAM:      Constitutional: A&Ox3    Respiratory: non labored breathing, no respiratory distress    Cardiovascular: NSR, RRR    Gastrointestinal: abdomen soft, tender to palpation near incision site, mildly distended.                  Incision: WTD packing changed at bedside. Fibrinous material noted, unchanged from prior exam. Ostomy functioning with liquid stool output.     Extremities: (-) edema                  I&O's Detail    2020 07:01  -  2020 07:00  --------------------------------------------------------  IN:    Solution: 500 mL    Solution: 100 mL    Solution: 250 mL    Solution: 300 mL  Total IN: 1150 mL    OUT:    Ileostomy: 625 mL    Voided: 550 mL  Total OUT: 1175 mL    Total NET: -25 mL          LABS:                        7.1    10.49 )-----------( 435      ( 2020 07:28 )             21.9     04-14    132<L>  |  102  |  12  ----------------------------<  170<H>  3.8   |  17<L>  |  2.59<H>    Ca    7.2<L>      2020 00:34  Phos  4.0     04-14  Mg     2.0     04-14    TPro  6.0  /  Alb  2.3<L>  /  TBili  0.4  /  DBili  x   /  AST  37  /  ALT  31  /  AlkPhos  115  04-14      Urinalysis Basic - ( 2020 10:36 )    Color: Yellow / Appearance: Clear / S.010 / pH: x  Gluc: x / Ketone: NEGATIVE  / Bili: Negative / Urobili: 0.2 E.U./dL   Blood: x / Protein: Trace mg/dL / Nitrite: NEGATIVE   Leuk Esterase: NEGATIVE / RBC: < 5 /HPF / WBC < 5 /HPF   Sq Epi: x / Non Sq Epi: 0-5 /HPF / Bacteria: Present /HPF        RADIOLOGY & ADDITIONAL STUDIES:

## 2020-04-14 NOTE — PROGRESS NOTE ADULT - ATTENDING COMMENTS
Ileostomy functioning and now s/p paracentesis. Monitor electrolytes closely/expect fluid shifting. Prognosis is guarded long term given his condition and COVID-19 infection. Surgical care is supportive at this point with regard to wound care.

## 2020-04-14 NOTE — PROGRESS NOTE ADULT - ASSESSMENT
59 yo male with DM, EtOH cirrhosis, presented two weeks ago with abdominal pain and distention, constipation, found to have colonic ischemia of undetermined etiology c/b toxic megacolon s/p subtotal colectomy 3/31; of note was COVID PCR negative on that day. Subsequent hospital course c/b fever; abdominal wound culture 4/9 with polymicrobial growth however wound does not presently appear infected; suspect fevers due to COVID-19 infection, which was confirmed yesterday. s/p paracentesis yesterday however appears fluid only sent for cytology, not cell count nor culture. Regardless, doubt SBP as patient has been on Zosyn for over two weeks and there is an alternative explanation for his fevers.   - recommend GI evaluation for ?melena as well as new MEG ?2/2 HRS  - favor discontinuation of Zosyn and observation off antibiotics at this time; if GI c/f active variceal bleed, can commence ceftriaxone 1g IV q24h ppx  - COVID-19 management as per primary team--currently on hydroxychloroquine and azithromycin; minimally hypoxic on RA  - f/u GOC; guarded prognosis in cirrhotic patient with MEG in setting of COVID-19 infection    Please reconsult with ?  ID Team 2

## 2020-04-14 NOTE — PROGRESS NOTE ADULT - SUBJECTIVE AND OBJECTIVE BOX
INTERVAL HPI/OVERNIGHT EVENTS: No new fevers. ?episode of melena overnight. Also endorsed vomiting but not hematemesis.    CONSTITUTIONAL:  Negative fever or chills, feels well, good appetite  EYES:  Negative  blurry vision or double vision  CARDIOVASCULAR:  Negative for chest pain or palpitations  RESPIRATORY:  Negative for cough, wheezing, or SOB   GASTROINTESTINAL:  Negative for nausea, vomiting, diarrhea, constipation, or abdominal pain  GENITOURINARY:  Negative frequency, urgency or dysuria  NEUROLOGIC:  No headache, confusion, dizziness, lightheadedness      ANTIBIOTICS/RELEVANT:    MEDICATIONS  (STANDING):  amLODIPine   Tablet 10 milliGRAM(s) Oral daily  aspirin enteric coated 81 milliGRAM(s) Oral daily  atorvastatin 10 milliGRAM(s) Oral at bedtime  azithromycin  IVPB 250 milliGRAM(s) IV Intermittent every 24 hours  azithromycin  IVPB      dextrose 5%. 1000 milliLiter(s) (50 mL/Hr) IV Continuous <Continuous>  dextrose 50% Injectable 12.5 Gram(s) IV Push once  dextrose 50% Injectable 25 Gram(s) IV Push once  dextrose 50% Injectable 25 Gram(s) IV Push once  gabapentin 100 milliGRAM(s) Oral every 6 hours  hydroxychloroquine 400 milliGRAM(s) Oral every 24 hours  hydroxychloroquine   Oral   insulin glargine Injectable (LANTUS) 16 Unit(s) SubCutaneous at bedtime  insulin lispro (HumaLOG) corrective regimen sliding scale   SubCutaneous Before meals and at bedtime  insulin lispro Injectable (HumaLOG) 6 Unit(s) SubCutaneous three times a day before meals  levothyroxine Injectable 40 MICROGram(s) IV Push at bedtime  loperamide 2 milliGRAM(s) Oral every 6 hours  piperacillin/tazobactam IVPB.. 3.375 Gram(s) IV Intermittent every 6 hours  sodium chloride 0.9%. 1000 milliLiter(s) (75 mL/Hr) IV Continuous <Continuous>    MEDICATIONS  (PRN):  acetaminophen   Tablet .. 650 milliGRAM(s) Oral every 6 hours PRN Temp greater or equal to 38C (100.4F), Mild Pain (1 - 3)  dextrose 40% Gel 15 Gram(s) Oral once PRN Blood Glucose LESS THAN 70 milliGRAM(s)/deciliter  glucagon  Injectable 1 milliGRAM(s) IntraMuscular once PRN Glucose LESS THAN 70 milligrams/deciliter  hydrALAZINE Injectable 10 milliGRAM(s) IV Push every 4 hours PRN SBP >170  labetalol Injectable 20 milliGRAM(s) IV Push every 6 hours PRN 2nd line drug, Systolic blood pressure > 170  ondansetron Injectable 4 milliGRAM(s) IV Push every 6 hours PRN Nausea and/or Vomiting  oxycodone    5 mG/acetaminophen 325 mG 1 Tablet(s) Oral every 6 hours PRN Severe Pain (7 - 10)  traZODone 50 milliGRAM(s) Oral at bedtime PRN insomnia        Vital Signs Last 24 Hrs  T(C): 36.9 (14 Apr 2020 08:29), Max: 37.9 (13 Apr 2020 21:07)  T(F): 98.4 (14 Apr 2020 08:29), Max: 100.3 (13 Apr 2020 21:07)  HR: 78 (14 Apr 2020 08:29) (78 - 89)  BP: 166/73 (14 Apr 2020 08:29) (131/63 - 166/73)  BP(mean): --  RR: 16 (14 Apr 2020 08:29) (16 - 22)  SpO2: 94% (14 Apr 2020 08:29) (92% - 98%)    PHYSICAL EXAM:  Constitutional:Well-developed, well nourished  Eyes:ROSE, EOMI  Ear/Nose/Throat: no oral lesion, no sinus tenderness on percussion	  Neck:no JVD, no lymphadenopathy, supple  Respiratory: CTA sophia  Cardiovascular: S1S2 RRR, no murmurs  Gastrointestinal:soft, (+) BS, no HSM, mildly distended, non tender; midline abdominal wound does not appear infected  Extremities:no e/e/c  Vascular: DP Pulse:	right normal; left normal      LABS:                        7.1    10.49 )-----------( 435      ( 14 Apr 2020 07:28 )             21.9     04-14    129<L>  |  103  |  15  ----------------------------<  195<H>  4.0   |  17<L>  |  3.08<H>    Ca    7.1<L>      14 Apr 2020 07:28  Phos  4.1     04-14  Mg     1.9     04-14    TPro  5.9<L>  /  Alb  2.2<L>  /  TBili  0.4  /  DBili  x   /  AST  30  /  ALT  31  /  AlkPhos  105  04-14    PT/INR - ( 14 Apr 2020 07:28 )   PT: 14.9 sec;   INR: 1.30          PTT - ( 14 Apr 2020 07:28 )  PTT:28.7 sec      MICROBIOLOGY: COVID-19 PCR . (04.13.20 @ 15:53)    COVID-19 PCR: Detected: This test has been validated by [employer] to be accurate;  though it has not been FDA cleared/approved by the usual pathway.  As with all laboratory tests, results should be correlated with clinical  findings.  https://www.fda.gov/media/982276/download  https://www.fda.gov/media/127252/download        RADIOLOGY & ADDITIONAL STUDIES: reviewed

## 2020-04-14 NOTE — PROGRESS NOTE ADULT - ASSESSMENT
Assessment: 60y/M with  1.	COVID (+)  2.	peritonitis, esophagitis; s/p subtotal colectomy, RLQ ileostomy, Anna pouch  3.	Severe Anemia  4.	ETOH abuse  5.	Hypertension dyslipidemia Diabetes Mellitus, uncontrolled  6.	hypothyroidism    ID: febrile, no leukocytosis, continue zosyn, azithromycin, hydroxychloroquine; f/u microbiologic work-up; f/u ESR, CRP, ferritin, D-dimer, PCT; steroids if inflammatory markers up    ·	ESR:    ·	CRP:    ·	WBC:  7.53 (04-13) 7.84 (04-13) 6.70 (04-12) 7.65 (04-11) 10.89 (04-10) 9.30 (04-09) 11.56 (04-08) 8.59 (04-07)   ·	Bands:    ·	Lymph:    ·	Procalcitonin:    ·	Ferritin:    ·	D-dimer:      Pulmonary:  PRN O2 support to keep sats 92-96%    Cardiovascular:  Keep MAP 60-65, continue amlodipine, EKG for QTc    GI:   clear liquid, CCD    Endo:  Keep fingersticks 140-180 mg/dL, insulin glargine 10, insulin lispro 4 units TID, mod ISS, continue levothyroxine; received 6 units coverage    Renal: BUN/Crea:  04-13 / 6 / 1.30; rising crea - trend; hyponatremia. treat hyperglycemia    Neuro: continue gabapentin    Heme:  DVT chemoprophylaxis: start SQL if ok with surgery    ATTENDING ATTESTATION:  I was physically present for the key portions of the evaluation and management (E/M) service provided.  I agree with the above history, physical and plan which I have reviewed and edited where appropriate.     Patient not at high risk for neurologic deterioration / death.  Time spent on this noncritically ill patient: 45 minutes spent on total encounter, more than 50% of the visit was spent counseling and/or coordinating care by the attending physician.    Plan discussed with RN, house staff.    REVIEW OF SYSTEMS:  No headaches, no nausea or vomiting; 14 -point review of systems otherwise unremarkable. Assessment: 60y/M with  1.	COVID (+)  2.	peritonitis, esophagitis; s/p subtotal colectomy, RLQ ileostomy, Anna pouch  3.	Severe Anemia  4.	ETOH abuse  5.	Hypertension dyslipidemia Diabetes Mellitus, uncontrolled  6.	hypothyroidism    ID: febrile, no leukocytosis, continue zosyn, azithromycin, hydroxychloroquine; f/u microbiologic work-up; f/u ESR, CRP, ferritin, D-dimer, PCT; steroids if inflammatory markers up    ·	ESR:    ·	CRP:    ·	WBC:  7.53 (04-13) 7.84 (04-13) 6.70 (04-12) 7.65 (04-11) 10.89 (04-10) 9.30 (04-09) 11.56 (04-08) 8.59 (04-07)   ·	Bands:    ·	Lymph:    ·	Procalcitonin:    ·	Ferritin:    ·	D-dimer:      Pulmonary:  PRN O2 support to keep sats 92-96%    Cardiovascular:  Keep MAP 60-65, continue amlodipine, EKG for QTc    GI:   clear liquid, CCD    Endo:  Keep fingersticks 140-180 mg/dL, insulin glargine 10, insulin lispro 4 units TID, mod ISS, continue levothyroxine; received 6 units coverage    Renal: BUN/Crea:  04-13 / 6 / 1.30; rising crea - trend; hyponatremia. treat hyperglycemia    Neuro: continue gabapentin    Heme:  f/u repeat CBC s/p 1 unit pRBC, DVT chemoprophylaxis: no DVT chemoprophylaxis for now for severe anemia.    ATTENDING ATTESTATION:  I was physically present for the key portions of the evaluation and management (E/M) service provided.  I agree with the above history, physical and plan which I have reviewed and edited where appropriate.     Patient not at high risk for neurologic deterioration / death.  Time spent on this noncritically ill patient: 45 minutes spent on total encounter, more than 50% of the visit was spent counseling and/or coordinating care by the attending physician.    Plan discussed with RN, house staff.    REVIEW OF SYSTEMS:  No headaches, no nausea or vomiting; 14 -point review of systems otherwise unremarkable.

## 2020-04-14 NOTE — PROGRESS NOTE ADULT - SUBJECTIVE AND OBJECTIVE BOX
=================================  NEUROCRITICAL CARE ATTENDING NOTE  =================================    STUART DE LOS SANTOS   MRN-[id number]  Summary:  60y/M with Hypertension Diabetes Mellitus neuropathy, ETOH liver cirrhosis, presented with fall.  Brought to [facility].  CT showed distended colon, area of pneumatosis.       Past Medical History: Anemia ETOH abuse HLD (hyperlipidemia) HTN (hypertension) DM (diabetes mellitus)  Allergies:  No Known Allergies  Home meds:   ·	amLODIPine 10 mg oral tablet: 1 tab(s) orally once a day  ·	aspirin 81 mg oral tablet: 1 tab(s) orally once a day  ·	baclofen 10 mg oral tablet: 1 tab(s) orally 3 times a day  ·	gabapentin: 1200 milligram(s) orally 3 times a day  ·	Janumet 50 mg-1000 mg oral tablet: 1 tab(s) orally 2 times a day  ·	lactulose 10 g/15 mL oral solution: 1 tbsp TID  ·	Levemir 100 units/mL subcutaneous solution: 29 unit(s) subcutaneous every 12 hours  ·	levothyroxine 50 mcg (0.05 mg) oral tablet: 1 tab(s) orally once a day  ·	lisinopril-hydrochlorothiazide 20 mg-25 mg oral tablet: 1 tab(s) orally once a day  ·	NovoLOG 100 units/mL subcutaneous solution: 22 unit(s) subcutaneous 2 times a day, before breakfast and before dinner  ·	NovoLOG 100 units/mL subcutaneous solution: 10 unit(s) subcutaneous once a day before lunch  ·	pravastatin 40 mg oral tablet: 1 tab(s) orally once a day  ·	traZODone 100 mg oral tablet: 100 milligram(s) orally once a day (at bedtime)    PHYSICAL EXAMINATION  T(C): 37.9 ( @ 21:07), Max: 38.8 ( @ 01:08) HR: 88 ( @ 21:07) (79 - 88) BP: 156/70 ( @ 21:07) (137/54 - 158/85) RR: 18 ( @ 21:07) (16 - 22) SpO2: 92% ( @ 21:07) (92% - 93%)  NEUROLOGIC EXAMINATION:  Patient is awake, alert, fully oriented, nonfocal motor exam  GENERAL: not intubated, not in cardiorespiratory distress  EENT:  anicteric  CARDIOVASCULAR: (+) S1 S2, normal rate and regular rhythm  PULMONARY: clear to auscultation bilaterally  ABDOMEN: soft, distended, nontender  EXTREMITIES: no edema  SKIN: no rash    LABS:  CAPILLARY BLOOD GLUCOSE 181 166 147 232 275 157                6.3    7.53  )-----------( 343      ( 2020 08:52 )             19.4     Lymphos:      132<L>  |  100  |  6<L>  ----------------------------<  248<H>  3.6   |  19<L>  |  1.30    Ca    7.3<L>      2020 06:56  Phos  2.3       Mg     1.8         HbA1C = 6.9 () 7.6 ()     @ 07:01  -   @ 07:00  IN: 4640 mL / OUT: 3050 mL / NET: 1590 mL    Bacteriology:   Blood CS NG12h x2    Blood CS NG3D   Blood CS NG (final)   surgical swab:  staph aureus, enterococcus avium enterococcus faecalis    CSF studies:  EEG:  Neuroimaging:  Other imagin/10 CT abd/pelvis:  s/p subtotal colectomy, RLQ ileostomy, Anna pouch formulation; mod size ascites, most abundant in flanks, slight peritoneal enhancement ? peritonitis ?esophagitis    MEDICATIONS:  ·	aspirin enteric coated 81 milliGRAM(s) Oral daily  ·	azithromycin  IVPB 250 milliGRAM(s) IV Intermittent every 24 hours  ·	hydroxychloroquine 400 milliGRAM(s) Oral every 24 hours  ·	piperacillin/tazobactam IVPB.. 3.375 Gram(s) IV Intermittent every 6 hours  ·	gabapentin 100 milliGRAM(s) Oral every 6 hours  ·	oxycodone    5 mG/acetaminophen 325 mG 1 Tablet(s) Oral every 6 hours PRN  ·	traZODone 50 milliGRAM(s) Oral at bedtime PRN  ·	amLODIPine   Tablet 10 milliGRAM(s) Oral daily  ·	loperamide 2 milliGRAM(s) Oral every 6 hours  ·	atorvastatin 10 milliGRAM(s) Oral at bedtime  ·	insulin glargine Injectable (LANTUS) 10 Unit(s) SubCutaneous at bedtime  ·	insulin lispro (HumaLOG) corrective regimen sliding scale   SubCutaneous Before meals and at bedtime  ·	insulin lispro Injectable (HumaLOG) 4 Unit(s) SubCutaneous three times a day before meals  ·	levothyroxine Injectable 40 MICROGram(s) IV Push at bedtime    IV FLUIDS: IVL  DRIPS:  DIET: clear liquid, CCD  Lines:  Drains:    Wounds:    CODE STATUS:  Full Code                       GOALS OF CARE:  aggressive                      DISPOSITION:  9Wo

## 2020-04-14 NOTE — PROGRESS NOTE ADULT - SUBJECTIVE AND OBJECTIVE BOX
INTERVAL HPI/OVERNIGHT EVENTS:    Patient is a 60y old  Male who presents with a chief complaint of toxic megacolon (04 Apr 2020 14:38)      Pt reports the following symptoms:    CONSTITUTIONAL:  Negative fever or chills, feels well, good appetite  EYES:  Negative  blurry vision or double vision  CARDIOVASCULAR:  Negative for chest pain or palpitations  RESPIRATORY:  Negative for cough, wheezing, or SOB   GASTROINTESTINAL:  Negative for nausea, vomiting, diarrhea, constipation, or abdominal pain  GENITOURINARY:  Negative frequency, urgency or dysuria  NEUROLOGIC:  No headache, confusion, dizziness, lightheadedness    MEDICATIONS  (STANDING):  amLODIPine   Tablet 10 milliGRAM(s) Oral daily  aspirin enteric coated 81 milliGRAM(s) Oral daily  atorvastatin 10 milliGRAM(s) Oral at bedtime  azithromycin  IVPB 250 milliGRAM(s) IV Intermittent every 24 hours  azithromycin  IVPB      dextrose 5%. 1000 milliLiter(s) (50 mL/Hr) IV Continuous <Continuous>  dextrose 50% Injectable 12.5 Gram(s) IV Push once  dextrose 50% Injectable 25 Gram(s) IV Push once  dextrose 50% Injectable 25 Gram(s) IV Push once  gabapentin 100 milliGRAM(s) Oral every 6 hours  hydroxychloroquine 400 milliGRAM(s) Oral every 24 hours  hydroxychloroquine   Oral   insulin glargine Injectable (LANTUS) 16 Unit(s) SubCutaneous at bedtime  insulin lispro (HumaLOG) corrective regimen sliding scale   SubCutaneous Before meals and at bedtime  insulin lispro Injectable (HumaLOG) 6 Unit(s) SubCutaneous three times a day before meals  levothyroxine Injectable 40 MICROGram(s) IV Push at bedtime  loperamide 2 milliGRAM(s) Oral every 6 hours  piperacillin/tazobactam IVPB.. 3.375 Gram(s) IV Intermittent every 6 hours  sodium chloride 0.9%. 1000 milliLiter(s) (75 mL/Hr) IV Continuous <Continuous>    MEDICATIONS  (PRN):  acetaminophen   Tablet .. 650 milliGRAM(s) Oral every 6 hours PRN Temp greater or equal to 38C (100.4F), Mild Pain (1 - 3)  dextrose 40% Gel 15 Gram(s) Oral once PRN Blood Glucose LESS THAN 70 milliGRAM(s)/deciliter  glucagon  Injectable 1 milliGRAM(s) IntraMuscular once PRN Glucose LESS THAN 70 milligrams/deciliter  hydrALAZINE Injectable 10 milliGRAM(s) IV Push every 4 hours PRN SBP >170  labetalol Injectable 20 milliGRAM(s) IV Push every 6 hours PRN 2nd line drug, Systolic blood pressure > 170  ondansetron Injectable 4 milliGRAM(s) IV Push every 6 hours PRN Nausea and/or Vomiting  oxycodone    5 mG/acetaminophen 325 mG 1 Tablet(s) Oral every 6 hours PRN Severe Pain (7 - 10)  traZODone 50 milliGRAM(s) Oral at bedtime PRN insomnia      PHYSICAL EXAM  Vital Signs Last 24 Hrs  T(C): 36.9 (14 Apr 2020 08:29), Max: 37.9 (13 Apr 2020 21:07)  T(F): 98.4 (14 Apr 2020 08:29), Max: 100.3 (13 Apr 2020 21:07)  HR: 78 (14 Apr 2020 08:29) (78 - 89)  BP: 166/73 (14 Apr 2020 08:29) (131/63 - 166/73)  BP(mean): --  RR: 16 (14 Apr 2020 08:29) (16 - 22)  SpO2: 94% (14 Apr 2020 08:29) (92% - 98%)    Constitutional: wn/wd in NAD.   HEENT: NCAT, MMM, OP clear, EOMI, no proptosis or lid retraction  Neck: no thyromegaly or palpable thyroid nodules   Respiratory: lungs CTAB.  Cardiovascular: regular rhythm, normal S1 and S2, no audible murmurs, no peripheral edema  GI: soft, NT/ND, no masses/HSM appreciated.  Neurology: no tremors, DTR 2+  Skin: no visible rashes/lesions  Psychiatric: AAO x 3, normal affect/mood.    LABS:                        7.1    10.49 )-----------( 435      ( 14 Apr 2020 07:28 )             21.9     04-14    129<L>  |  103  |  15  ----------------------------<  195<H>  4.0   |  17<L>  |  3.08<H>    Ca    7.1<L>      14 Apr 2020 07:28  Phos  4.1     04-14  Mg     1.9     04-14    TPro  5.9<L>  /  Alb  2.2<L>  /  TBili  0.4  /  DBili  x   /  AST  30  /  ALT  31  /  AlkPhos  105  04-14    PT/INR - ( 14 Apr 2020 07:28 )   PT: 14.9 sec;   INR: 1.30          PTT - ( 14 Apr 2020 07:28 )  PTT:28.7 sec    Thyroid Stimulating Hormone, Serum: 2.813 uIU/mL (04-14 @ 07:28)  Thyroid Stimulating Hormone, Serum: 2.877 uIU/mL (04-14 @ 00:34)      HbA1C: 6.9 % (04-05 @ 06:59)  7.6 % (03-31 @ 05:58)    CAPILLARY BLOOD GLUCOSE      POCT Blood Glucose.: 192 mg/dL (14 Apr 2020 11:27)  POCT Blood Glucose.: 183 mg/dL (14 Apr 2020 08:35)  POCT Blood Glucose.: 181 mg/dL (14 Apr 2020 00:09)  POCT Blood Glucose.: 166 mg/dL (13 Apr 2020 16:38)      Insulin Sliding Scale requirements X 24 Hours:    RADIOLOGY & ADDITIONAL TESTS:    A/P: 60y Male with history of DM type II presenting for       1.  DM -     Please continue           units lantus at bedtime  / in the morning and        units lispro with meals and lispro moderate / low dose sliding scale 4 times daily with meals and at bedtime.  Please continue consistent carbohydrate diet.      Goal FSG is   Will continue to monitor   For discharge, pt can continue    Pt can follow up at discharge with [facility] by calling  to make an appointment.   Will discuss case with     and update primary team INTERVAL HPI/OVERNIGHT EVENTS:    Patient is a 60y old  Male who presents with a chief complaint of toxic megacolon (04 Apr 2020 14:38)  Spoke to the primary team taking care of the patient.  he is s/p 1 Unit PRBC transfusion - Hb was 7.1 today.  s/p IR paracentesis for ascites - 240cc drained - pending culture  saturating well on room air.  on Clear liquid diet today  On levothyroxine 40mcg IV  Annemarie/zosyn in D5W. has low grade temps  FSG and insulin administration reviewed.      Pt reports the following symptoms:  CONSTITUTIONAL:  Negative chills  CARDIOVASCULAR:  Negative for chest pain or palpitations  RESPIRATORY:  Negative for cough, wheezing  GASTROINTESTINAL:  Negative for diarrhea, constipation  NEUROLOGIC:  No headache, dizziness, lightheadedness    MEDICATIONS  (STANDING):  amLODIPine   Tablet 10 milliGRAM(s) Oral daily  aspirin enteric coated 81 milliGRAM(s) Oral daily  atorvastatin 10 milliGRAM(s) Oral at bedtime  azithromycin  IVPB 250 milliGRAM(s) IV Intermittent every 24 hours  azithromycin  IVPB      dextrose 5%. 1000 milliLiter(s) (50 mL/Hr) IV Continuous <Continuous>  dextrose 50% Injectable 12.5 Gram(s) IV Push once  dextrose 50% Injectable 25 Gram(s) IV Push once  dextrose 50% Injectable 25 Gram(s) IV Push once  gabapentin 100 milliGRAM(s) Oral every 6 hours  hydroxychloroquine 400 milliGRAM(s) Oral every 24 hours  hydroxychloroquine   Oral   insulin glargine Injectable (LANTUS) 16 Unit(s) SubCutaneous at bedtime  insulin lispro (HumaLOG) corrective regimen sliding scale   SubCutaneous Before meals and at bedtime  insulin lispro Injectable (HumaLOG) 6 Unit(s) SubCutaneous three times a day before meals  levothyroxine Injectable 40 MICROGram(s) IV Push at bedtime  loperamide 2 milliGRAM(s) Oral every 6 hours  piperacillin/tazobactam IVPB.. 3.375 Gram(s) IV Intermittent every 6 hours  sodium chloride 0.9%. 1000 milliLiter(s) (75 mL/Hr) IV Continuous <Continuous>    MEDICATIONS  (PRN):  acetaminophen   Tablet .. 650 milliGRAM(s) Oral every 6 hours PRN Temp greater or equal to 38C (100.4F), Mild Pain (1 - 3)  dextrose 40% Gel 15 Gram(s) Oral once PRN Blood Glucose LESS THAN 70 milliGRAM(s)/deciliter  glucagon  Injectable 1 milliGRAM(s) IntraMuscular once PRN Glucose LESS THAN 70 milligrams/deciliter  hydrALAZINE Injectable 10 milliGRAM(s) IV Push every 4 hours PRN SBP >170  labetalol Injectable 20 milliGRAM(s) IV Push every 6 hours PRN 2nd line drug, Systolic blood pressure > 170  ondansetron Injectable 4 milliGRAM(s) IV Push every 6 hours PRN Nausea and/or Vomiting  oxycodone    5 mG/acetaminophen 325 mG 1 Tablet(s) Oral every 6 hours PRN Severe Pain (7 - 10)  traZODone 50 milliGRAM(s) Oral at bedtime PRN insomnia      PHYSICAL EXAM  Vital Signs Last 24 Hrs  T(C): 36.9 (14 Apr 2020 08:29), Max: 37.9 (13 Apr 2020 21:07)  T(F): 98.4 (14 Apr 2020 08:29), Max: 100.3 (13 Apr 2020 21:07)  HR: 78 (14 Apr 2020 08:29) (78 - 89)  BP: 166/73 (14 Apr 2020 08:29) (131/63 - 166/73)  BP(mean): --  RR: 16 (14 Apr 2020 08:29) (16 - 22)  SpO2: 94% (14 Apr 2020 08:29) (92% - 98%)    Due to the nature of this patient’s COVID-19 isolation status (either confirmed or suspected), this note was prepared without a bedside physical examination to prevent spread of infection and to conserve personal protective equipment during this nationwide pandemic. If possible, direct patient communication occurred via electronic measures or telephone conversation. Examination highlights were provided by a bedside nurse wearing personal protective equipment and review of pertinent medical records. Objective data (vital signs, urine output, lab results, imaging studies, medications, etc) were reviewed in detail. Face to face visits and physical examination have been limited only to patients for whom it is required for medical decision making.    LABS:                        7.1    10.49 )-----------( 435      ( 14 Apr 2020 07:28 )             21.9     04-14    129<L>  |  103  |  15  ----------------------------<  195<H>  4.0   |  17<L>  |  3.08<H>    Ca    7.1<L>      14 Apr 2020 07:28  Phos  4.1     04-14  Mg     1.9     04-14    TPro  5.9<L>  /  Alb  2.2<L>  /  TBili  0.4  /  DBili  x   /  AST  30  /  ALT  31  /  AlkPhos  105  04-14    PT/INR - ( 14 Apr 2020 07:28 )   PT: 14.9 sec;   INR: 1.30          PTT - ( 14 Apr 2020 07:28 )  PTT:28.7 sec    Thyroid Stimulating Hormone, Serum: 2.813 uIU/mL (04-14 @ 07:28)  Thyroid Stimulating Hormone, Serum: 2.877 uIU/mL (04-14 @ 00:34)      HbA1C: 6.9 % (04-05 @ 06:59)  7.6 % (03-31 @ 05:58)    CAPILLARY BLOOD GLUCOSE      POCT Blood Glucose.: 192 mg/dL (14 Apr 2020 11:27)  POCT Blood Glucose.: 183 mg/dL (14 Apr 2020 08:35)  POCT Blood Glucose.: 181 mg/dL (14 Apr 2020 00:09)  POCT Blood Glucose.: 166 mg/dL (13 Apr 2020 16:38)      Insulin Sliding Scale requirements X 24 Hours:    RADIOLOGY & ADDITIONAL TESTS:    A/P: 60y Male with history of DM type II presenting for       1.  DM -     Please continue           units lantus at bedtime  / in the morning and        units lispro with meals and lispro moderate / low dose sliding scale 4 times daily with meals and at bedtime.  Please continue consistent carbohydrate diet.      Goal FSG is   Will continue to monitor   For discharge, pt can continue    Pt can follow up at discharge with [facility] by calling  to make an appointment.   Will discuss case with     and update primary team INTERVAL HPI/OVERNIGHT EVENTS:    Patient is a 60y old  Male who presents with a chief complaint of toxic megacolon (04 Apr 2020 14:38)  Spoke to the primary team taking care of the patient.  he is s/p 1 Unit PRBC transfusion - Hb was 7.1 today.  s/p IR paracentesis for ascites - 240cc drained - pending culture  saturating well on room air.  on Clear liquid diet today  On levothyroxine 40mcg IV  Annemarie/zosyn in D5W. has low grade temps  FSG and insulin administration reviewed.      Pt reports the following symptoms:  CONSTITUTIONAL:  Negative chills  CARDIOVASCULAR:  Negative for chest pain or palpitations  RESPIRATORY:  Negative for cough, wheezing  GASTROINTESTINAL:  Negative for diarrhea, constipation  NEUROLOGIC:  No headache, dizziness, lightheadedness    MEDICATIONS  (STANDING):  amLODIPine   Tablet 10 milliGRAM(s) Oral daily  aspirin enteric coated 81 milliGRAM(s) Oral daily  atorvastatin 10 milliGRAM(s) Oral at bedtime  azithromycin  IVPB 250 milliGRAM(s) IV Intermittent every 24 hours  azithromycin  IVPB      dextrose 5%. 1000 milliLiter(s) (50 mL/Hr) IV Continuous <Continuous>  dextrose 50% Injectable 12.5 Gram(s) IV Push once  dextrose 50% Injectable 25 Gram(s) IV Push once  dextrose 50% Injectable 25 Gram(s) IV Push once  gabapentin 100 milliGRAM(s) Oral every 6 hours  hydroxychloroquine 400 milliGRAM(s) Oral every 24 hours  hydroxychloroquine   Oral   insulin glargine Injectable (LANTUS) 16 Unit(s) SubCutaneous at bedtime  insulin lispro (HumaLOG) corrective regimen sliding scale   SubCutaneous Before meals and at bedtime  insulin lispro Injectable (HumaLOG) 6 Unit(s) SubCutaneous three times a day before meals  levothyroxine Injectable 40 MICROGram(s) IV Push at bedtime  loperamide 2 milliGRAM(s) Oral every 6 hours  piperacillin/tazobactam IVPB.. 3.375 Gram(s) IV Intermittent every 6 hours  sodium chloride 0.9%. 1000 milliLiter(s) (75 mL/Hr) IV Continuous <Continuous>    MEDICATIONS  (PRN):  acetaminophen   Tablet .. 650 milliGRAM(s) Oral every 6 hours PRN Temp greater or equal to 38C (100.4F), Mild Pain (1 - 3)  dextrose 40% Gel 15 Gram(s) Oral once PRN Blood Glucose LESS THAN 70 milliGRAM(s)/deciliter  glucagon  Injectable 1 milliGRAM(s) IntraMuscular once PRN Glucose LESS THAN 70 milligrams/deciliter  hydrALAZINE Injectable 10 milliGRAM(s) IV Push every 4 hours PRN SBP >170  labetalol Injectable 20 milliGRAM(s) IV Push every 6 hours PRN 2nd line drug, Systolic blood pressure > 170  ondansetron Injectable 4 milliGRAM(s) IV Push every 6 hours PRN Nausea and/or Vomiting  oxycodone    5 mG/acetaminophen 325 mG 1 Tablet(s) Oral every 6 hours PRN Severe Pain (7 - 10)  traZODone 50 milliGRAM(s) Oral at bedtime PRN insomnia      PHYSICAL EXAM  Vital Signs Last 24 Hrs  T(C): 36.9 (14 Apr 2020 08:29), Max: 37.9 (13 Apr 2020 21:07)  T(F): 98.4 (14 Apr 2020 08:29), Max: 100.3 (13 Apr 2020 21:07)  HR: 78 (14 Apr 2020 08:29) (78 - 89)  BP: 166/73 (14 Apr 2020 08:29) (131/63 - 166/73)  BP(mean): --  RR: 16 (14 Apr 2020 08:29) (16 - 22)  SpO2: 94% (14 Apr 2020 08:29) (92% - 98%)    Due to the nature of this patient’s COVID-19 isolation status (either confirmed or suspected), this note was prepared without a bedside physical examination to prevent spread of infection and to conserve personal protective equipment during this nationwide pandemic. If possible, direct patient communication occurred via electronic measures or telephone conversation. Examination highlights were provided by a bedside nurse wearing personal protective equipment and review of pertinent medical records. Objective data (vital signs, urine output, lab results, imaging studies, medications, etc) were reviewed in detail. Face to face visits and physical examination have been limited only to patients for whom it is required for medical decision making.    LABS:                        7.1    10.49 )-----------( 435      ( 14 Apr 2020 07:28 )             21.9     04-14    129<L>  |  103  |  15  ----------------------------<  195<H>  4.0   |  17<L>  |  3.08<H>    Ca    7.1<L>      14 Apr 2020 07:28  Phos  4.1     04-14  Mg     1.9     04-14    TPro  5.9<L>  /  Alb  2.2<L>  /  TBili  0.4  /  DBili  x   /  AST  30  /  ALT  31  /  AlkPhos  105  04-14    PT/INR - ( 14 Apr 2020 07:28 )   PT: 14.9 sec;   INR: 1.30          PTT - ( 14 Apr 2020 07:28 )  PTT:28.7 sec    Thyroid Stimulating Hormone, Serum: 2.813 uIU/mL (04-14 @ 07:28)  Thyroid Stimulating Hormone, Serum: 2.877 uIU/mL (04-14 @ 00:34)      HbA1C: 6.9 % (04-05 @ 06:59)  7.6 % (03-31 @ 05:58)    CAPILLARY BLOOD GLUCOSE      POCT Blood Glucose.: 192 mg/dL (14 Apr 2020 11:27)  POCT Blood Glucose.: 183 mg/dL (14 Apr 2020 08:35)  POCT Blood Glucose.: 181 mg/dL (14 Apr 2020 00:09)  POCT Blood Glucose.: 166 mg/dL (13 Apr 2020 16:38)      Insulin Sliding Scale requirements X 24 Hours:    RADIOLOGY & ADDITIONAL TESTS:    A/P: 59 y/o M with PMH of HTN, DM complicated by neuropathy, EtOH liver cirrhosis, presenting w/ pneumatosis of unknown etiology, s/p sub total colectomy and end ileostomy on 3/31, c/b fever and purulent wound infection (4/9). Found to be covid positive.     1.  DM type 2 - 7% unreliable due to anemia, complicated. Pt has poor appetite  Please decrease to lantus 12 units at night  Please increase to lispro 8 units before each meal.    Please continue lispro moderate dose sliding scale four times daily with meals and at bedtime  Pt's fingerstick glucose goal is 140-180.     2. Hypothyroidism  - TSH 2.813  - Please obtain TPO and antithyroid antibodies.  -Continue IV levothyroxine 40mcg daily     Will continue to monitor     For discharge, TBD    Pt can follow up at discharge with Dr. Underwood by calling  to make an appointment.   Case d/w with Dr. Underwood and primary team updated.

## 2020-04-14 NOTE — PROGRESS NOTE ADULT - ASSESSMENT
61 y/o M with PMH of HTN, DM complicated by neuropathy, EtOH liver cirrhosis, presenting w/ pneumatosis of unknown etiology, s/p sub total colectomy and end ileostomy on 3/31, c/b fever and purulent wound infection (4/9) now s/p paracentesis 4/13    Neuro: Pain/Nausea control  CV: HDS but hypertensive, lisinopril 40 mg and amlodipine 10mg, Labetalol 20 mg PRN BP >170, hydralazine 10 mg prn  Pulm: CED RAMIREZ: CLD, Ileostomy pink, ostomy functioning, received ostomy teaching.  : Voids, urinating in bed- inacurrate I/Os  ID: Zosyn (4/9-), vanc (4/9-4/13), azithro 4/13-, plaquenil 4/13  Endo: ISS, lantus 7, synthroid  PPx: SCDs, SQH, OOBA  PT/OT: initial evaluation 4/3  Wounds: wet to dry dressing in place

## 2020-04-14 NOTE — PROGRESS NOTE ADULT - ATTENDING COMMENTS
Agree with above  pt now s/p paracentesis  insulin administration reviewed  on clear liquid diet  remains on abx  good saturation on room air.   Due to the nature of this patient’s COVID-19 isolation status (either confirmed or suspected), this note was prepared without a bedside physical examination to prevent spread of infection and to conserve personal protective equipment during this nationwide pandemic. Objective data (vital signs, urine output, lab results, imaging studies, medications, etc) were reviewed in detail. Physical examinations have been limited only to patients for whom it is required for medical decision making.    Continue levothyroxine 75mcg once daily PO  continue atorvastatin 10mg daily  continue 12 units lantus at bedtime, 8 units lispro tID with meals, lispro ISF-25 with meals and at bedtime.   outpatient weight management  will follow

## 2020-04-14 NOTE — ADVANCED PRACTICE NURSE CONSULT - ASSESSMENT
2 piece appliance intact, emptied 400 cc's and removed gas during assessment. No teaching performed at this time.

## 2020-04-15 LAB
APPEARANCE UR: CLEAR — SIGNIFICANT CHANGE UP
APTT BLD: 29 SEC — SIGNIFICANT CHANGE UP (ref 27.5–36.3)
BILIRUB UR-MCNC: NEGATIVE — SIGNIFICANT CHANGE UP
COLOR SPEC: YELLOW — SIGNIFICANT CHANGE UP
CREAT ?TM UR-MCNC: 25 MG/DL — SIGNIFICANT CHANGE UP
DIFF PNL FLD: ABNORMAL
GAMMA INTERFERON BACKGROUND BLD IA-ACNC: 0.04 IU/ML — SIGNIFICANT CHANGE UP
GLUCOSE BLDC GLUCOMTR-MCNC: 125 MG/DL — HIGH (ref 70–99)
GLUCOSE BLDC GLUCOMTR-MCNC: 129 MG/DL — HIGH (ref 70–99)
GLUCOSE BLDC GLUCOMTR-MCNC: 147 MG/DL — HIGH (ref 70–99)
GLUCOSE BLDC GLUCOMTR-MCNC: 76 MG/DL — SIGNIFICANT CHANGE UP (ref 70–99)
GLUCOSE BLDC GLUCOMTR-MCNC: 77 MG/DL — SIGNIFICANT CHANGE UP (ref 70–99)
GLUCOSE UR QL: NEGATIVE — SIGNIFICANT CHANGE UP
INR BLD: 1.31 — HIGH (ref 0.88–1.16)
KETONES UR-MCNC: NEGATIVE — SIGNIFICANT CHANGE UP
LEUKOCYTE ESTERASE UR-ACNC: NEGATIVE — SIGNIFICANT CHANGE UP
M TB IFN-G BLD-IMP: ABNORMAL
M TB IFN-G CD4+ BCKGRND COR BLD-ACNC: 0.14 IU/ML — SIGNIFICANT CHANGE UP
M TB IFN-G CD4+CD8+ BCKGRND COR BLD-ACNC: 0.16 IU/ML — SIGNIFICANT CHANGE UP
NITRITE UR-MCNC: NEGATIVE — SIGNIFICANT CHANGE UP
NON-GYNECOLOGICAL CYTOLOGY STUDY: SIGNIFICANT CHANGE UP
OSMOLALITY UR: 87 MOSMOL/KG — LOW (ref 100–650)
PH UR: 6 — SIGNIFICANT CHANGE UP (ref 5–8)
POTASSIUM UR-SCNC: 6 MMOL/L — SIGNIFICANT CHANGE UP
PROT ?TM UR-MCNC: 29 MG/DL — HIGH (ref 0–12)
PROT UR-MCNC: 30 MG/DL
PROTHROM AB SERPL-ACNC: 15 SEC — HIGH (ref 10–12.9)
QUANT TB PLUS MITOGEN MINUS NIL: 0.04 IU/ML — SIGNIFICANT CHANGE UP
SODIUM UR-SCNC: <20 MMOL/L — SIGNIFICANT CHANGE UP
SP GR SPEC: <=1.005 — SIGNIFICANT CHANGE UP (ref 1–1.03)
UROBILINOGEN FLD QL: 0.2 E.U./DL — SIGNIFICANT CHANGE UP
UUN UR-MCNC: 96 MG/DL — SIGNIFICANT CHANGE UP

## 2020-04-15 PROCEDURE — 99254 IP/OBS CNSLTJ NEW/EST MOD 60: CPT | Mod: GC

## 2020-04-15 PROCEDURE — 71045 X-RAY EXAM CHEST 1 VIEW: CPT | Mod: 26

## 2020-04-15 PROCEDURE — 99233 SBSQ HOSP IP/OBS HIGH 50: CPT | Mod: GC

## 2020-04-15 RX ORDER — SODIUM CHLORIDE 9 MG/ML
1000 INJECTION INTRAMUSCULAR; INTRAVENOUS; SUBCUTANEOUS
Refills: 0 | Status: DISCONTINUED | OUTPATIENT
Start: 2020-04-15 | End: 2020-04-16

## 2020-04-15 RX ORDER — GABAPENTIN 400 MG/1
100 CAPSULE ORAL AT BEDTIME
Refills: 0 | Status: DISCONTINUED | OUTPATIENT
Start: 2020-04-15 | End: 2020-04-19

## 2020-04-15 RX ORDER — SODIUM CHLORIDE 9 MG/ML
1000 INJECTION INTRAMUSCULAR; INTRAVENOUS; SUBCUTANEOUS ONCE
Refills: 0 | Status: COMPLETED | OUTPATIENT
Start: 2020-04-15 | End: 2020-04-15

## 2020-04-15 RX ORDER — INSULIN GLARGINE 100 [IU]/ML
8 INJECTION, SOLUTION SUBCUTANEOUS AT BEDTIME
Refills: 0 | Status: DISCONTINUED | OUTPATIENT
Start: 2020-04-15 | End: 2020-04-17

## 2020-04-15 RX ORDER — INSULIN LISPRO 100/ML
7 VIAL (ML) SUBCUTANEOUS
Refills: 0 | Status: DISCONTINUED | OUTPATIENT
Start: 2020-04-15 | End: 2020-04-17

## 2020-04-15 RX ORDER — ONDANSETRON 8 MG/1
4 TABLET, FILM COATED ORAL ONCE
Refills: 0 | Status: COMPLETED | OUTPATIENT
Start: 2020-04-15 | End: 2020-04-16

## 2020-04-15 RX ORDER — CEFTRIAXONE 500 MG/1
2000 INJECTION, POWDER, FOR SOLUTION INTRAMUSCULAR; INTRAVENOUS EVERY 24 HOURS
Refills: 0 | Status: DISCONTINUED | OUTPATIENT
Start: 2020-04-15 | End: 2020-04-15

## 2020-04-15 RX ORDER — SODIUM BICARBONATE 1 MEQ/ML
650 SYRINGE (ML) INTRAVENOUS THREE TIMES A DAY
Refills: 0 | Status: DISCONTINUED | OUTPATIENT
Start: 2020-04-15 | End: 2020-04-16

## 2020-04-15 RX ORDER — LEVOTHYROXINE SODIUM 125 MCG
75 TABLET ORAL DAILY
Refills: 0 | Status: DISCONTINUED | OUTPATIENT
Start: 2020-04-16 | End: 2020-04-16

## 2020-04-15 RX ORDER — TRAZODONE HCL 50 MG
50 TABLET ORAL ONCE
Refills: 0 | Status: COMPLETED | OUTPATIENT
Start: 2020-04-15 | End: 2020-04-15

## 2020-04-15 RX ADMIN — Medication 8 UNIT(S): at 07:42

## 2020-04-15 RX ADMIN — CEFTRIAXONE 100 MILLIGRAM(S): 500 INJECTION, POWDER, FOR SOLUTION INTRAMUSCULAR; INTRAVENOUS at 19:55

## 2020-04-15 RX ADMIN — SODIUM CHLORIDE 4000 MILLILITER(S): 9 INJECTION INTRAMUSCULAR; INTRAVENOUS; SUBCUTANEOUS at 16:05

## 2020-04-15 RX ADMIN — GABAPENTIN 100 MILLIGRAM(S): 400 CAPSULE ORAL at 21:24

## 2020-04-15 RX ADMIN — GABAPENTIN 100 MILLIGRAM(S): 400 CAPSULE ORAL at 10:35

## 2020-04-15 RX ADMIN — Medication 2 MILLIGRAM(S): at 10:35

## 2020-04-15 RX ADMIN — Medication 81 MILLIGRAM(S): at 11:35

## 2020-04-15 RX ADMIN — PANTOPRAZOLE SODIUM 40 MILLIGRAM(S): 20 TABLET, DELAYED RELEASE ORAL at 16:11

## 2020-04-15 RX ADMIN — Medication 50 MILLIGRAM(S): at 23:03

## 2020-04-15 RX ADMIN — SODIUM CHLORIDE 100 MILLILITER(S): 9 INJECTION INTRAMUSCULAR; INTRAVENOUS; SUBCUTANEOUS at 17:10

## 2020-04-15 RX ADMIN — GABAPENTIN 100 MILLIGRAM(S): 400 CAPSULE ORAL at 04:33

## 2020-04-15 RX ADMIN — Medication 40 MICROGRAM(S): at 21:23

## 2020-04-15 RX ADMIN — AZITHROMYCIN 250 MILLIGRAM(S): 500 TABLET, FILM COATED ORAL at 21:25

## 2020-04-15 RX ADMIN — INSULIN GLARGINE 8 UNIT(S): 100 INJECTION, SOLUTION SUBCUTANEOUS at 23:03

## 2020-04-15 RX ADMIN — OXYCODONE AND ACETAMINOPHEN 1 TABLET(S): 5; 325 TABLET ORAL at 21:53

## 2020-04-15 RX ADMIN — ATORVASTATIN CALCIUM 10 MILLIGRAM(S): 80 TABLET, FILM COATED ORAL at 21:24

## 2020-04-15 RX ADMIN — AMLODIPINE BESYLATE 10 MILLIGRAM(S): 2.5 TABLET ORAL at 04:32

## 2020-04-15 RX ADMIN — Medication 400 MILLIGRAM(S): at 21:24

## 2020-04-15 RX ADMIN — Medication 650 MILLIGRAM(S): at 15:21

## 2020-04-15 RX ADMIN — Medication 650 MILLIGRAM(S): at 10:34

## 2020-04-15 RX ADMIN — Medication 2 MILLIGRAM(S): at 04:32

## 2020-04-15 RX ADMIN — Medication 650 MILLIGRAM(S): at 21:24

## 2020-04-15 RX ADMIN — Medication 8 UNIT(S): at 12:02

## 2020-04-15 RX ADMIN — PANTOPRAZOLE SODIUM 40 MILLIGRAM(S): 20 TABLET, DELAYED RELEASE ORAL at 04:32

## 2020-04-15 NOTE — CONSULT NOTE ADULT - ATTENDING COMMENTS
volume depletion with dry MM, diarrhea, lactic acidosis, MEG  with colonic ileus. treating with iv abx, for possible ischemic colitis, IVF. lactate reducing indicating improving perfusion. bedside TTE: LVH, DIAMOND. will treat with further IVF. UO adequate, reducing Cr. monitor serial lactate, UO.
Chart reviewed at length including notes/meds/labs/VS's.  Case d/w fellow throughout the day.  Agree with details of note above.   Bladder Scan: 19cc.  **Pt in need of volume; his urine sodium is low (<20).  Give 500cc NS bolus, then give maintenance of /hr x 1 liter.
No signs of active bleed.  Continue to monitor Hgb, for signs of a bleed.  EGD and a colonoscopy as outpt to investigate anemia.
Agree with above.   Pt seen at bedside.   Fevers noted, now covid positive  insulin administration this admission reviewed  pt now s/p pRBC today, sp IR ascites drainage  was NPO at breakfast and lunch  remains on abx, vancomycin, zosyn  can continue levothyroxine  continue 16 units lantus at bedtime, lispro 6 units tid with meals, isf-25 lispro with meals and at bedtime.   continue lipitor 10mg daily  outpatient obesity management.   will follow

## 2020-04-15 NOTE — PROGRESS NOTE ADULT - SUBJECTIVE AND OBJECTIVE BOX
INTERVAL HPI/OVERNIGHT EVENTS: NAEO. Pt seen and examined at bedside by chief resident. Pt has no current complaints. Denies any n/v/cp/sob. No eating too much, reports gas and stool from ostomy.     STATUS POST:  ex lap, subtotal colectomy and ileostomy creation/IR paracentesis    POST OPERATIVE DAY #: 15/2    MEDICATIONS  (STANDING):  amLODIPine   Tablet 10 milliGRAM(s) Oral daily  aspirin enteric coated 81 milliGRAM(s) Oral daily  atorvastatin 10 milliGRAM(s) Oral at bedtime  azithromycin  IVPB 250 milliGRAM(s) IV Intermittent every 24 hours  azithromycin  IVPB      dextrose 5%. 1000 milliLiter(s) (50 mL/Hr) IV Continuous <Continuous>  dextrose 50% Injectable 12.5 Gram(s) IV Push once  dextrose 50% Injectable 25 Gram(s) IV Push once  dextrose 50% Injectable 25 Gram(s) IV Push once  gabapentin 100 milliGRAM(s) Oral every 6 hours  hydroxychloroquine 400 milliGRAM(s) Oral every 24 hours  hydroxychloroquine   Oral   insulin glargine Injectable (LANTUS) 12 Unit(s) SubCutaneous at bedtime  insulin lispro (HumaLOG) corrective regimen sliding scale   SubCutaneous Before meals and at bedtime  insulin lispro Injectable (HumaLOG) 8 Unit(s) SubCutaneous three times a day before meals  levothyroxine Injectable 40 MICROGram(s) IV Push at bedtime  loperamide 2 milliGRAM(s) Oral every 6 hours  pantoprazole  Injectable 40 milliGRAM(s) IV Push two times a day    MEDICATIONS  (PRN):  acetaminophen   Tablet .. 650 milliGRAM(s) Oral every 6 hours PRN Temp greater or equal to 38C (100.4F), Mild Pain (1 - 3)  dextrose 40% Gel 15 Gram(s) Oral once PRN Blood Glucose LESS THAN 70 milliGRAM(s)/deciliter  glucagon  Injectable 1 milliGRAM(s) IntraMuscular once PRN Glucose LESS THAN 70 milligrams/deciliter  hydrALAZINE Injectable 10 milliGRAM(s) IV Push every 4 hours PRN SBP >170  labetalol Injectable 20 milliGRAM(s) IV Push every 6 hours PRN 2nd line drug, Systolic blood pressure > 170  ondansetron Injectable 4 milliGRAM(s) IV Push every 6 hours PRN Nausea and/or Vomiting  oxycodone    5 mG/acetaminophen 325 mG 1 Tablet(s) Oral every 6 hours PRN Severe Pain (7 - 10)  traZODone 50 milliGRAM(s) Oral at bedtime PRN insomnia      Vital Signs Last 24 Hrs  T(C): 36.9 (15 Apr 2020 08:36), Max: 36.9 (14 Apr 2020 17:00)  T(F): 98.4 (15 Apr 2020 08:36), Max: 98.5 (14 Apr 2020 17:00)  HR: 79 (15 Apr 2020 08:36) (79 - 90)  BP: 116/58 (15 Apr 2020 08:36) (116/58 - 155/68)  BP(mean): --  RR: 18 (15 Apr 2020 08:36) (17 - 20)  SpO2: 99% (15 Apr 2020 08:36) (94% - 99%)    PHYSICAL EXAM:      Constitutional: A&Ox3    Respiratory: non labored breathing, no respiratory distress    Cardiovascular: NSR, RRR    Gastrointestinal: abdomen is soft, non-tender, non-distended.                  Incision: good granulation tissue in superficial aspect of the wound, fibrinous material in base, no exudate, wound in tact.     Extremities: (-) edema                  I&O's Detail    14 Apr 2020 07:01  -  15 Apr 2020 07:00  --------------------------------------------------------  IN:    sodium chloride 0.9%: 900 mL    Solution: 100 mL    Solution: 250 mL  Total IN: 1250 mL    OUT:    Ileostomy: 400 mL    Ileostomy: 800 mL    Voided: 700 mL  Total OUT: 1900 mL    Total NET: -650 mL          LABS:                        7.1    10.49 )-----------( 435      ( 14 Apr 2020 07:28 )             21.9     04-14    129<L>  |  103  |  15  ----------------------------<  195<H>  4.0   |  17<L>  |  3.08<H>    Ca    7.1<L>      14 Apr 2020 07:28  Phos  4.1     04-14  Mg     1.9     04-14    TPro  5.9<L>  /  Alb  2.2<L>  /  TBili  0.4  /  DBili  x   /  AST  30  /  ALT  31  /  AlkPhos  105  04-14    PT/INR - ( 14 Apr 2020 07:28 )   PT: 14.9 sec;   INR: 1.30          PTT - ( 14 Apr 2020 07:28 )  PTT:28.7 sec      RADIOLOGY & ADDITIONAL STUDIES:

## 2020-04-15 NOTE — CONSULT NOTE ADULT - ASSESSMENT
59 y/o M with PMH of HTN, DM complicated by neuropathy, EtOH liver cirrhosis, presenting w/ pneumatosis of unknown etiology, s/p sub total colectomy and end ileostomy on 3/31, c/b fever and purulent wound infection (4/9), ascites now s/p paracentesis 4/13. Course further complicated by worsening MEG,  anemia, and COVID+     ETOH cirrhosis- MELD NA 25  Ascites: s/p para on 4/10, 240 cc sent. Cytology negative, but not sent for cell count/culture. Patient has been on zosyn for 2 weeks so doubt SBP  HE: on lactulose at home, currently oriented. Recommend continuing with lactulose, goal 2 to 3 BMs a day  Varices: unkown. H/h dwontrending but no signs of active bleeding at this time. Recommend EGD as outpatient to evaluate  HCC 61 y/o M with PMH of HTN, DM complicated by neuropathy, EtOH liver cirrhosis, presenting w/ pneumatosis of unknown etiology, s/p sub total colectomy and end ileostomy on 3/31, c/b fever and purulent wound infection (4/9), ascites now s/p paracentesis 4/13. Course further complicated by worsening MEG,  anemia, and COVID+     ETOH cirrhosis- MELD NA 25  Ascites: s/p para on 4/10, 140 cc sent. Cytology negative, but not sent for cell count/culture. Patient has been on zosyn for 2 weeks so doubt SBP  HE: on lactulose at home, currently oriented. Recommend continuing with lactulose, goal 2 to 3 BMs a day  Varices: unkown. H/h dwontrending but no signs of active bleeding at this time. Recommend EGD as outpatient to evaluate  HCC: no lession seen on CT, recommend screening q6 months  MEG: working up - rule out retention, also possibly pre -renal vs atn from vanc, contrast. Hepatorenal is a diagnosis of exlusion  Anemia: no signs of active bleeding. Recommend EGD, cscope as outpatient.

## 2020-04-15 NOTE — PROGRESS NOTE ADULT - SUBJECTIVE AND OBJECTIVE BOX
INTERVAL HPI/OVERNIGHT EVENTS:    Patient is a 60y old  Male who presents with a chief complaint of Covid + (15 Apr 2020 10:11)      Pt reports the following symptoms:    CONSTITUTIONAL:  Negative fever or chills, feels well, good appetite  EYES:  Negative  blurry vision or double vision  CARDIOVASCULAR:  Negative for chest pain or palpitations  RESPIRATORY:  Negative for cough, wheezing, or SOB   GASTROINTESTINAL:  Negative for nausea, vomiting, diarrhea, constipation, or abdominal pain  GENITOURINARY:  Negative frequency, urgency or dysuria  NEUROLOGIC:  No headache, confusion, dizziness, lightheadedness    MEDICATIONS  (STANDING):  amLODIPine   Tablet 10 milliGRAM(s) Oral daily  atorvastatin 10 milliGRAM(s) Oral at bedtime  azithromycin  IVPB 250 milliGRAM(s) IV Intermittent every 24 hours  azithromycin  IVPB      dextrose 5%. 1000 milliLiter(s) (50 mL/Hr) IV Continuous <Continuous>  dextrose 50% Injectable 12.5 Gram(s) IV Push once  dextrose 50% Injectable 25 Gram(s) IV Push once  dextrose 50% Injectable 25 Gram(s) IV Push once  gabapentin 100 milliGRAM(s) Oral at bedtime  hydroxychloroquine 400 milliGRAM(s) Oral every 24 hours  hydroxychloroquine   Oral   insulin glargine Injectable (LANTUS) 12 Unit(s) SubCutaneous at bedtime  insulin lispro (HumaLOG) corrective regimen sliding scale   SubCutaneous Before meals and at bedtime  insulin lispro Injectable (HumaLOG) 8 Unit(s) SubCutaneous three times a day before meals  levothyroxine Injectable 40 MICROGram(s) IV Push at bedtime  pantoprazole  Injectable 40 milliGRAM(s) IV Push two times a day  sodium bicarbonate 650 milliGRAM(s) Oral three times a day  sodium chloride 0.9%. 1000 milliLiter(s) (100 mL/Hr) IV Continuous <Continuous>    MEDICATIONS  (PRN):  dextrose 40% Gel 15 Gram(s) Oral once PRN Blood Glucose LESS THAN 70 milliGRAM(s)/deciliter  glucagon  Injectable 1 milliGRAM(s) IntraMuscular once PRN Glucose LESS THAN 70 milligrams/deciliter  oxycodone    5 mG/acetaminophen 325 mG 1 Tablet(s) Oral every 6 hours PRN Severe Pain (7 - 10)      PHYSICAL EXAM  Vital Signs Last 24 Hrs  T(C): 36.8 (15 Apr 2020 13:30), Max: 36.9 (14 Apr 2020 17:00)  T(F): 98.2 (15 Apr 2020 13:30), Max: 98.5 (14 Apr 2020 17:00)  HR: 80 (15 Apr 2020 13:30) (79 - 90)  BP: 138/63 (15 Apr 2020 13:30) (116/58 - 155/68)  BP(mean): --  RR: 18 (15 Apr 2020 13:30) (17 - 20)  SpO2: 95% (15 Apr 2020 13:30) (94% - 99%)    Constitutional: wn/wd in NAD.   HEENT: NCAT, MMM, OP clear, EOMI, no proptosis or lid retraction  Neck: no thyromegaly or palpable thyroid nodules   Respiratory: lungs CTAB.  Cardiovascular: regular rhythm, normal S1 and S2, no audible murmurs, no peripheral edema  GI: soft, NT/ND, no masses/HSM appreciated.  Neurology: no tremors, DTR 2+  Skin: no visible rashes/lesions  Psychiatric: AAO x 3, normal affect/mood.    LABS:                        7.1    10.49 )-----------( 435      ( 14 Apr 2020 07:28 )             21.9     04-14    129<L>  |  103  |  15  ----------------------------<  195<H>  4.0   |  17<L>  |  3.08<H>    Ca    7.1<L>      14 Apr 2020 07:28  Phos  4.1     04-14  Mg     1.9     04-14    TPro  5.9<L>  /  Alb  2.2<L>  /  TBili  0.4  /  DBili  x   /  AST  30  /  ALT  31  /  AlkPhos  105  04-14    PT/INR - ( 15 Apr 2020 08:55 )   PT: 15.0 sec;   INR: 1.31          PTT - ( 15 Apr 2020 08:55 )  PTT:29.0 sec  Urinalysis Basic - ( 15 Apr 2020 12:28 )    Color: Yellow / Appearance: Clear / SG: <=1.005 / pH: x  Gluc: x / Ketone: NEGATIVE  / Bili: Negative / Urobili: 0.2 E.U./dL   Blood: x / Protein: 30 mg/dL / Nitrite: NEGATIVE   Leuk Esterase: NEGATIVE / RBC: < 5 /HPF / WBC < 5 /HPF   Sq Epi: x / Non Sq Epi: 0-5 /HPF / Bacteria: Present /HPF      Thyroid Stimulating Hormone, Serum: 2.813 uIU/mL (04-14 @ 07:28)  Thyroid Stimulating Hormone, Serum: 2.877 uIU/mL (04-14 @ 00:34)      HbA1C: 6.9 % (04-05 @ 06:59)  7.6 % (03-31 @ 05:58)    CAPILLARY BLOOD GLUCOSE      POCT Blood Glucose.: 147 mg/dL (15 Apr 2020 11:50)  POCT Blood Glucose.: 129 mg/dL (15 Apr 2020 07:39)  POCT Blood Glucose.: 125 mg/dL (15 Apr 2020 06:33)  POCT Blood Glucose.: 128 mg/dL (14 Apr 2020 22:21)  POCT Blood Glucose.: 131 mg/dL (14 Apr 2020 17:30)      Insulin Sliding Scale requirements X 24 Hours:    RADIOLOGY & ADDITIONAL TESTS:    A/P: 60y Male with history of DM type II presenting for       1.  DM -     Please continue           units lantus at bedtime  / in the morning and        units lispro with meals and lispro moderate / low dose sliding scale 4 times daily with meals and at bedtime.  Please continue consistent carbohydrate diet.      Goal FSG is   Will continue to monitor   For discharge, pt can continue    Pt can follow up at discharge with [facility] by calling  to make an appointment.   Will discuss case with     and update primary team INTERVAL HPI/OVERNIGHT EVENTS:    Patient is a 60y old  Male who presents with a chief complaint of Covid + (15 Apr 2020 10:11)  Spoke to the primary team taking care of the patient.  As per ID - no needs ABX anymore. GI has been consulted.  no labs for today.  saturating well on room air.  on Clear liquid diet   On levothyroxine 40mcg IV  Azithro in D5W.   FSG and insulin administration reviewed.    Pt reports the following symptoms:  CONSTITUTIONAL:  Negative chills  CARDIOVASCULAR:  Negative for chest pain or palpitations  RESPIRATORY:  Negative for cough, wheezing  GASTROINTESTINAL:  Negative for diarrhea, constipation  NEUROLOGIC:  No headache, dizziness, lightheadedness    MEDICATIONS  (STANDING):  amLODIPine   Tablet 10 milliGRAM(s) Oral daily  atorvastatin 10 milliGRAM(s) Oral at bedtime  azithromycin  IVPB 250 milliGRAM(s) IV Intermittent every 24 hours  azithromycin  IVPB      dextrose 5%. 1000 milliLiter(s) (50 mL/Hr) IV Continuous <Continuous>  dextrose 50% Injectable 12.5 Gram(s) IV Push once  dextrose 50% Injectable 25 Gram(s) IV Push once  dextrose 50% Injectable 25 Gram(s) IV Push once  gabapentin 100 milliGRAM(s) Oral at bedtime  hydroxychloroquine 400 milliGRAM(s) Oral every 24 hours  hydroxychloroquine   Oral   insulin glargine Injectable (LANTUS) 12 Unit(s) SubCutaneous at bedtime  insulin lispro (HumaLOG) corrective regimen sliding scale   SubCutaneous Before meals and at bedtime  insulin lispro Injectable (HumaLOG) 8 Unit(s) SubCutaneous three times a day before meals  levothyroxine Injectable 40 MICROGram(s) IV Push at bedtime  pantoprazole  Injectable 40 milliGRAM(s) IV Push two times a day  sodium bicarbonate 650 milliGRAM(s) Oral three times a day  sodium chloride 0.9%. 1000 milliLiter(s) (100 mL/Hr) IV Continuous <Continuous>    MEDICATIONS  (PRN):  dextrose 40% Gel 15 Gram(s) Oral once PRN Blood Glucose LESS THAN 70 milliGRAM(s)/deciliter  glucagon  Injectable 1 milliGRAM(s) IntraMuscular once PRN Glucose LESS THAN 70 milligrams/deciliter  oxycodone    5 mG/acetaminophen 325 mG 1 Tablet(s) Oral every 6 hours PRN Severe Pain (7 - 10)      PHYSICAL EXAM  Vital Signs Last 24 Hrs  T(C): 36.8 (15 Apr 2020 13:30), Max: 36.9 (14 Apr 2020 17:00)  T(F): 98.2 (15 Apr 2020 13:30), Max: 98.5 (14 Apr 2020 17:00)  HR: 80 (15 Apr 2020 13:30) (79 - 90)  BP: 138/63 (15 Apr 2020 13:30) (116/58 - 155/68)  BP(mean): --  RR: 18 (15 Apr 2020 13:30) (17 - 20)  SpO2: 95% (15 Apr 2020 13:30) (94% - 99%)    Due to the nature of this patient’s COVID-19 isolation status (either confirmed or suspected), this note was prepared without a bedside physical examination to prevent spread of infection and to conserve personal protective equipment during this nationwide pandemic. If possible, direct patient communication occurred via electronic measures or telephone conversation. Examination highlights were provided by a bedside nurse wearing personal protective equipment and review of pertinent medical records. Objective data (vital signs, urine output, lab results, imaging studies, medications, etc) were reviewed in detail. Face to face visits and physical examination have been limited only to patients for whom it is required for medical decision making.    LABS:                        7.1    10.49 )-----------( 435      ( 14 Apr 2020 07:28 )             21.9     04-14    129<L>  |  103  |  15  ----------------------------<  195<H>  4.0   |  17<L>  |  3.08<H>    Ca    7.1<L>      14 Apr 2020 07:28  Phos  4.1     04-14  Mg     1.9     04-14    TPro  5.9<L>  /  Alb  2.2<L>  /  TBili  0.4  /  DBili  x   /  AST  30  /  ALT  31  /  AlkPhos  105  04-14    PT/INR - ( 15 Apr 2020 08:55 )   PT: 15.0 sec;   INR: 1.31          PTT - ( 15 Apr 2020 08:55 )  PTT:29.0 sec  Urinalysis Basic - ( 15 Apr 2020 12:28 )    Color: Yellow / Appearance: Clear / SG: <=1.005 / pH: x  Gluc: x / Ketone: NEGATIVE  / Bili: Negative / Urobili: 0.2 E.U./dL   Blood: x / Protein: 30 mg/dL / Nitrite: NEGATIVE   Leuk Esterase: NEGATIVE / RBC: < 5 /HPF / WBC < 5 /HPF   Sq Epi: x / Non Sq Epi: 0-5 /HPF / Bacteria: Present /HPF      Thyroid Stimulating Hormone, Serum: 2.813 uIU/mL (04-14 @ 07:28)  Thyroid Stimulating Hormone, Serum: 2.877 uIU/mL (04-14 @ 00:34)      HbA1C: 6.9 % (04-05 @ 06:59)  7.6 % (03-31 @ 05:58)    CAPILLARY BLOOD GLUCOSE      POCT Blood Glucose.: 147 mg/dL (15 Apr 2020 11:50)  POCT Blood Glucose.: 129 mg/dL (15 Apr 2020 07:39)  POCT Blood Glucose.: 125 mg/dL (15 Apr 2020 06:33)  POCT Blood Glucose.: 128 mg/dL (14 Apr 2020 22:21)  POCT Blood Glucose.: 131 mg/dL (14 Apr 2020 17:30)      Insulin Sliding Scale requirements X 24 Hours:    RADIOLOGY & ADDITIONAL TESTS:    A/P: 60y Male with history of DM type II presenting for       1.  DM -     Please continue           units lantus at bedtime  / in the morning and        units lispro with meals and lispro moderate / low dose sliding scale 4 times daily with meals and at bedtime.  Please continue consistent carbohydrate diet.      Goal FSG is   Will continue to monitor   For discharge, pt can continue    Pt can follow up at discharge with [facility] by calling  to make an appointment.   Will discuss case with     and update primary team INTERVAL HPI/OVERNIGHT EVENTS:    Patient is a 60y old  Male who presents with a chief complaint of Covid + (15 Apr 2020 10:11)  Spoke to the primary team taking care of the patient.  As per ID - no needs ABX anymore. GI has been consulted.  no labs for today.  saturating well on room air.  on Clear liquid diet   On levothyroxine 40mcg IV  Azithro in D5W.   FSG and insulin administration reviewed.    Pt reports the following symptoms:  CONSTITUTIONAL:  Negative chills  CARDIOVASCULAR:  Negative for chest pain or palpitations  RESPIRATORY:  Negative for cough, wheezing  GASTROINTESTINAL:  Negative for diarrhea, constipation  NEUROLOGIC:  No headache, dizziness, lightheadedness    MEDICATIONS  (STANDING):  amLODIPine   Tablet 10 milliGRAM(s) Oral daily  atorvastatin 10 milliGRAM(s) Oral at bedtime  azithromycin  IVPB 250 milliGRAM(s) IV Intermittent every 24 hours  azithromycin  IVPB      dextrose 5%. 1000 milliLiter(s) (50 mL/Hr) IV Continuous <Continuous>  dextrose 50% Injectable 12.5 Gram(s) IV Push once  dextrose 50% Injectable 25 Gram(s) IV Push once  dextrose 50% Injectable 25 Gram(s) IV Push once  gabapentin 100 milliGRAM(s) Oral at bedtime  hydroxychloroquine 400 milliGRAM(s) Oral every 24 hours  hydroxychloroquine   Oral   insulin glargine Injectable (LANTUS) 12 Unit(s) SubCutaneous at bedtime  insulin lispro (HumaLOG) corrective regimen sliding scale   SubCutaneous Before meals and at bedtime  insulin lispro Injectable (HumaLOG) 8 Unit(s) SubCutaneous three times a day before meals  levothyroxine Injectable 40 MICROGram(s) IV Push at bedtime  pantoprazole  Injectable 40 milliGRAM(s) IV Push two times a day  sodium bicarbonate 650 milliGRAM(s) Oral three times a day  sodium chloride 0.9%. 1000 milliLiter(s) (100 mL/Hr) IV Continuous <Continuous>    MEDICATIONS  (PRN):  dextrose 40% Gel 15 Gram(s) Oral once PRN Blood Glucose LESS THAN 70 milliGRAM(s)/deciliter  glucagon  Injectable 1 milliGRAM(s) IntraMuscular once PRN Glucose LESS THAN 70 milligrams/deciliter  oxycodone    5 mG/acetaminophen 325 mG 1 Tablet(s) Oral every 6 hours PRN Severe Pain (7 - 10)      PHYSICAL EXAM  Vital Signs Last 24 Hrs  T(C): 36.8 (15 Apr 2020 13:30), Max: 36.9 (14 Apr 2020 17:00)  T(F): 98.2 (15 Apr 2020 13:30), Max: 98.5 (14 Apr 2020 17:00)  HR: 80 (15 Apr 2020 13:30) (79 - 90)  BP: 138/63 (15 Apr 2020 13:30) (116/58 - 155/68)  BP(mean): --  RR: 18 (15 Apr 2020 13:30) (17 - 20)  SpO2: 95% (15 Apr 2020 13:30) (94% - 99%)    Due to the nature of this patient’s COVID-19 isolation status (either confirmed or suspected), this note was prepared without a bedside physical examination to prevent spread of infection and to conserve personal protective equipment during this nationwide pandemic. If possible, direct patient communication occurred via electronic measures or telephone conversation. Examination highlights were provided by a bedside nurse wearing personal protective equipment and review of pertinent medical records. Objective data (vital signs, urine output, lab results, imaging studies, medications, etc) were reviewed in detail. Face to face visits and physical examination have been limited only to patients for whom it is required for medical decision making.    LABS:                        7.1    10.49 )-----------( 435      ( 14 Apr 2020 07:28 )             21.9     04-14    129<L>  |  103  |  15  ----------------------------<  195<H>  4.0   |  17<L>  |  3.08<H>    Ca    7.1<L>      14 Apr 2020 07:28  Phos  4.1     04-14  Mg     1.9     04-14    TPro  5.9<L>  /  Alb  2.2<L>  /  TBili  0.4  /  DBili  x   /  AST  30  /  ALT  31  /  AlkPhos  105  04-14    PT/INR - ( 15 Apr 2020 08:55 )   PT: 15.0 sec;   INR: 1.31          PTT - ( 15 Apr 2020 08:55 )  PTT:29.0 sec  Urinalysis Basic - ( 15 Apr 2020 12:28 )    Color: Yellow / Appearance: Clear / SG: <=1.005 / pH: x  Gluc: x / Ketone: NEGATIVE  / Bili: Negative / Urobili: 0.2 E.U./dL   Blood: x / Protein: 30 mg/dL / Nitrite: NEGATIVE   Leuk Esterase: NEGATIVE / RBC: < 5 /HPF / WBC < 5 /HPF   Sq Epi: x / Non Sq Epi: 0-5 /HPF / Bacteria: Present /HPF      Thyroid Stimulating Hormone, Serum: 2.813 uIU/mL (04-14 @ 07:28)  Thyroid Stimulating Hormone, Serum: 2.877 uIU/mL (04-14 @ 00:34)      HbA1C: 6.9 % (04-05 @ 06:59)  7.6 % (03-31 @ 05:58)    CAPILLARY BLOOD GLUCOSE      POCT Blood Glucose.: 147 mg/dL (15 Apr 2020 11:50)  POCT Blood Glucose.: 129 mg/dL (15 Apr 2020 07:39)  POCT Blood Glucose.: 125 mg/dL (15 Apr 2020 06:33)  POCT Blood Glucose.: 128 mg/dL (14 Apr 2020 22:21)  POCT Blood Glucose.: 131 mg/dL (14 Apr 2020 17:30)      Insulin Sliding Scale requirements X 24 Hours:    RADIOLOGY & ADDITIONAL TESTS:    A/P: 59 y/o M with PMH of HTN, DM complicated by neuropathy, EtOH liver cirrhosis, presenting w/ pneumatosis of unknown etiology, s/p sub total colectomy and end ileostomy on 3/31, c/b fever and purulent wound infection (4/9). Found to be covid positive.     1.  DM type 2 - 7% unreliable due to anemia, complicated. Pt has poor appetite  Please continue lantus 12 units at night  Please continue lispro 8 units before each meal.    Please continue lispro moderate dose sliding scale four times daily with meals and at bedtime  Pt's fingerstick glucose goal is 140-180.     2. Hypothyroidism  - TSH 2.813  - Please obtain TPO and antithyroid antibodies.  -Continue IV levothyroxine 40mcg daily     Will continue to monitor     For discharge, TBD    Pt can follow up at discharge with Dr. Underwood by calling  to make an appointment.   Case d/w with Dr. Underwood and primary team updated.

## 2020-04-15 NOTE — PROGRESS NOTE ADULT - ATTENDING COMMENTS
Agree with above  no acute overnight events  insulin administration reviewed  remains on clear liquid diet  now started on COVID tx  per primary team, increasingly rigid abdomen, worsening renal function  GI and renal consulted  Due to the nature of this patient’s COVID-19 isolation status (either confirmed or suspected), this note was prepared without a bedside physical examination to prevent spread of infection and to conserve personal protective equipment during this nationwide pandemic. Objective data (vital signs, urine output, lab results, imaging studies, medications, etc) were reviewed in detail. Physical examinations have been limited only to patients for whom it is required for medical decision making.    Can continue lantus 12 units at beditme, lispro 8 units TID with meals, ISF-25 sliding scale with meals and at bedtime  check Tg Ab and TPO Ab  continue levothyroxine 75mcg once daily in the morning  outpatient weight management  continue atorvastatin 10mg daily  will follow

## 2020-04-15 NOTE — CONSULT NOTE ADULT - SUBJECTIVE AND OBJECTIVE BOX
Patient is a 60y old  Male who presents with a chief complaint of Covid + (15 Apr 2020 10:11)    Nephrology consulted for MEG.    HPI:  This is a 59 y/o M with PMH of HTN, DM complicated by neuropathy, EtOH liver cirrhosis, who presents to [facility] for fall at home.   At [facility] patient reported that he takes lactulose at home daily and has daily BM, but he has had no BM for the last 5 days.   At [facility] labs concerning for WBC 19, Cr 2.28, Lactates 8.6 (6.8 after 2L of IVF)  CT showing distended colon with small area of pneumatosis.   On arrival patient was stable, denied abdominal pain, said he has had 5 liquid/non bloody BM since coming to the ED.   Patient says he has had a Cscope in the past but does not remmeber when, he said it was normal    PAST MEDICAL & SURGICAL HISTORY:  Anemia  ETOH abuse  HLD (hyperlipidemia)  HTN (hypertension)  DM (diabetes mellitus)  No significant past surgical history      MEDICATIONS  (STANDING)  amLODIPine   Tablet 10 milliGRAM(s) Oral daily  aspirin enteric coated 81 milliGRAM(s) Oral daily  atorvastatin 10 milliGRAM(s) Oral at bedtime  azithromycin  IVPB 250 milliGRAM(s) IV Intermittent every 24 hours  azithromycin  IVPB      dextrose 5%. 1000 milliLiter(s) (50 mL/Hr) IV Continuous <Continuous>  dextrose 50% Injectable 12.5 Gram(s) IV Push once  dextrose 50% Injectable 25 Gram(s) IV Push once  dextrose 50% Injectable 25 Gram(s) IV Push once  gabapentin 100 milliGRAM(s) Oral every 6 hours  hydroxychloroquine 400 milliGRAM(s) Oral every 24 hours  hydroxychloroquine   Oral   insulin glargine Injectable (LANTUS) 12 Unit(s) SubCutaneous at bedtime  insulin lispro (HumaLOG) corrective regimen sliding scale   SubCutaneous Before meals and at bedtime  insulin lispro Injectable (HumaLOG) 8 Unit(s) SubCutaneous three times a day before meals  levothyroxine Injectable 40 MICROGram(s) IV Push at bedtime  loperamide 2 milliGRAM(s) Oral every 6 hours  pantoprazole  Injectable 40 milliGRAM(s) IV Push two times a day    MEDICATIONS  (PRN):  acetaminophen   Tablet .. 650 milliGRAM(s) Oral every 6 hours PRN Temp greater or equal to 38C (100.4F), Mild Pain (1 - 3)  dextrose 40% Gel 15 Gram(s) Oral once PRN Blood Glucose LESS THAN 70 milliGRAM(s)/deciliter  glucagon  Injectable 1 milliGRAM(s) IntraMuscular once PRN Glucose LESS THAN 70 milligrams/deciliter  hydrALAZINE Injectable 10 milliGRAM(s) IV Push every 4 hours PRN SBP >170  labetalol Injectable 20 milliGRAM(s) IV Push every 6 hours PRN 2nd line drug, Systolic blood pressure > 170  ondansetron Injectable 4 milliGRAM(s) IV Push every 6 hours PRN Nausea and/or Vomiting  oxycodone    5 mG/acetaminophen 325 mG 1 Tablet(s) Oral every 6 hours PRN Severe Pain (7 - 10)  traZODone 50 milliGRAM(s) Oral at bedtime PRN insomnia      Vital Signs Last 24 Hrs  T(C): 36.9 (15 Apr 2020 08:36), Max: 36.9 (14 Apr 2020 17:00)  T(F): 98.4 (15 Apr 2020 08:36), Max: 98.5 (14 Apr 2020 17:00)  HR: 79 (15 Apr 2020 08:36) (79 - 90)  BP: 116/58 (15 Apr 2020 08:36) (116/58 - 155/68)  BP(mean): --  RR: 18 (15 Apr 2020 08:36) (17 - 20)  SpO2: 99% (15 Apr 2020 08:36) (94% - 99%)      PHYSICAL EXAM:  deferred due to covid19+ isolation protocol    CAPILLARY BLOOD GLUCOSE      POCT Blood Glucose.: 129 mg/dL (15 Apr 2020 07:39)  POCT Blood Glucose.: 125 mg/dL (15 Apr 2020 06:33)  POCT Blood Glucose.: 128 mg/dL (14 Apr 2020 22:21)  POCT Blood Glucose.: 131 mg/dL (14 Apr 2020 17:30)      I&O's Summary    14 Apr 2020 07:01  -  15 Apr 2020 07:00  --------------------------------------------------------  IN: 1250 mL / OUT: 1900 mL / NET: -650 mL    15 Apr 2020 07:01  -  15 Apr 2020 11:47  --------------------------------------------------------  IN: 350 mL / OUT: 0 mL / NET: 350 mL          LABS:                            7.1    10.49 )-----------( 435      ( 14 Apr 2020 07:28 )             21.9       PT/INR - ( 15 Apr 2020 08:55 )   PT: 15.0 sec;   INR: 1.31          PTT - ( 15 Apr 2020 08:55 )  PTT:29.0 sec          RADIOLOGY & ADDITIONAL TESTS:    < from: Xray Chest 1 View- PORTABLE-Routine (04.15.20 @ 01:41) >    EXAM:  XR CHEST PORTABLE ROUTINE 1V                          PROCEDURE DATE:  04/15/2020          INTERPRETATION:  Chest x-ray    Indication: COVID-19 infection.     A portable frontal view of the chest is compared to the prior study dated 4/12/2020. Mild cardiomegaly. Slightly increased patchy bilateral pulmonary infiltrates. No pleural effusion or pneumothorax.      IMPRESSION: Slightly increased patchy bilateral infiltrates.        < end of copied text >

## 2020-04-15 NOTE — CONSULT NOTE ADULT - SUBJECTIVE AND OBJECTIVE BOX
61 y/o M with PMH of HTN, DM complicated by neuropathy, EtOH liver cirrhosis, presenting w/ pneumatosis of unknown etiology, s/p sub total colectomy and end ileostomy on 3/31, c/b fever and purulent wound infection (4/9), ascites now s/p paracentesis 4/13. Course further complicated by worsening MEG,  anemia, and COVID+     Patient has had hemoglobin of 7 today, down from baseline of 9. Per primary team there has been no signs of active bleeding  In terms of ascites, para perfromed on 4/13- only sent for cytology, not for cell count. ID following and pt has been on zosyn for two weeks, so doubt SBP      MEDICATIONS  (STANDING):  dextrose 5%. 1000 milliLiter(s) (50 mL/Hr) IV Continuous <Continuous>  dextrose 50% Injectable 12.5 Gram(s) IV Push once  dextrose 50% Injectable 25 Gram(s) IV Push once  dextrose 50% Injectable 25 Gram(s) IV Push once  heparin  Injectable 5000 Unit(s) SubCutaneous every 8 hours  insulin lispro (HumaLOG) corrective regimen sliding scale   SubCutaneous Before meals and at bedtime  lactated ringers. 1000 milliLiter(s) (150 mL/Hr) IV Continuous <Continuous>  piperacillin/tazobactam IVPB.. 3.375 Gram(s) IV Intermittent every 6 hours  sodium chloride 0.9% with potassium chloride 20 mEq/L 1000 milliLiter(s) (150 mL/Hr) IV Continuous <Continuous>  thiamine IVPB 500 milliGRAM(s) IV Intermittent every 8 hours    MEDICATIONS  (PRN):  dextrose 40% Gel 15 Gram(s) Oral once PRN Blood Glucose LESS THAN 70 milliGRAM(s)/deciliter  glucagon  Injectable 1 milliGRAM(s) IntraMuscular once PRN Glucose LESS THAN 70 milligrams/deciliter  HYDROmorphone  Injectable 0.5 milliGRAM(s) IV Push every 6 hours PRN Severe Pain (7 - 10)  oxyCODONE    IR 5 milliGRAM(s) Oral every 6 hours PRN Moderate Pain (4 - 6)      Allergies    No Known Allergies    Intolerances        SOCIAL HISTORY: says quit drinking in 2016     FAMILY HISTORY: no history of colorectal cancer       Vital Signs Last 24 Hrs  T(C): 36.3 (30 Mar 2020 16:38), Max: 36.3 (30 Mar 2020 10:40)  T(F): 97.3 (30 Mar 2020 16:38), Max: 97.4 (30 Mar 2020 10:40)  HR: 86 (30 Mar 2020 18:39) (62 - 88)  BP: 141/63 (30 Mar 2020 18:39) (85/54 - 163/75)  BP(mean): 90 (30 Mar 2020 18:39) (71 - 108)  RR: 20 (30 Mar 2020 18:39) (18 - 36)  SpO2: 98% (30 Mar 2020 18:39) (91% - 100%)    PHYSICAL EXAM  Neuro: awake and alert, no focal deficit   HEENT: normocephalic, no scleral ictera   Pulm: non labored breathing on NC, symmetric chest expansion   CV: regular rate and rhythm, palpable DP pulses b/l    GI: abdomen is distended, non tender to palpation, +hepatomegaly    Rectal exam: no mass   MSK: right toe amputation   Skin: no rash, no wound       LABS:                        13.8   11.19 )-----------( 162      ( 30 Mar 2020 17:39 )             39.7     03-30    125<L>  |  87<L>  |  18  ----------------------------<  244<H>  5.4<H>   |  20<L>  |  1.62<H>    Ca    11.1<H>      30 Mar 2020 17:39  Phos  6.5     03-30  Mg     2.0     03-30    TPro  8.2  /  Alb  4.8  /  TBili  0.6  /  DBili  x   /  AST  64<H>  /  ALT  43  /  AlkPhos  244<H>  03-30    PT/INR - ( 30 Mar 2020 10:35 )   PT: 11.3 sec;   INR: 1.02          PTT - ( 30 Mar 2020 10:35 )  PTT:25.5 sec      RADIOLOGY & ADDITIONAL STUDIES: (30 Mar 2020 19:08)    Allergies    No Known Allergies    Intolerances      Home Medications:  amLODIPine 10 mg oral tablet: 1 tab(s) orally once a day (14 Apr 2020 15:24)  aspirin 81 mg oral tablet: 1 tab(s) orally once a day (14 Apr 2020 15:24)  baclofen 10 mg oral tablet: 1 tab(s) orally 3 times a day (14 Apr 2020 15:24)  gabapentin: 1200 milligram(s) orally 3 times a day (14 Apr 2020 15:24)  Janumet 50 mg-1000 mg oral tablet: 1 tab(s) orally 2 times a day (14 Apr 2020 15:24)  lactulose 10 g/15 mL oral solution: 1 tbsp TID (14 Apr 2020 15:24)  Levemir 100 units/mL subcutaneous solution: 29 unit(s) subcutaneous every 12 hours (14 Apr 2020 15:24)  levothyroxine 50 mcg (0.05 mg) oral tablet: 1 tab(s) orally once a day (14 Apr 2020 15:24)  lisinopril-hydrochlorothiazide 20 mg-25 mg oral tablet: 1 tab(s) orally once a day (14 Apr 2020 15:24)  NovoLOG 100 units/mL subcutaneous solution: 22 unit(s) subcutaneous 2 times a day, before breakfast and before dinner (14 Apr 2020 15:24)  NovoLOG 100 units/mL subcutaneous solution: 10 unit(s) subcutaneous once a day before lunch (14 Apr 2020 15:24)  pravastatin 40 mg oral tablet: 1 tab(s) orally once a day (14 Apr 2020 15:24)  traZODone 100 mg oral tablet: 100 milligram(s) orally once a day (at bedtime) (14 Apr 2020 15:24)    MEDICATIONS:  MEDICATIONS  (STANDING):  amLODIPine   Tablet 10 milliGRAM(s) Oral daily  atorvastatin 10 milliGRAM(s) Oral at bedtime  azithromycin  IVPB 250 milliGRAM(s) IV Intermittent every 24 hours  azithromycin  IVPB      dextrose 5%. 1000 milliLiter(s) (50 mL/Hr) IV Continuous <Continuous>  dextrose 50% Injectable 12.5 Gram(s) IV Push once  dextrose 50% Injectable 25 Gram(s) IV Push once  dextrose 50% Injectable 25 Gram(s) IV Push once  gabapentin 100 milliGRAM(s) Oral at bedtime  hydroxychloroquine 400 milliGRAM(s) Oral every 24 hours  hydroxychloroquine   Oral   insulin glargine Injectable (LANTUS) 12 Unit(s) SubCutaneous at bedtime  insulin lispro (HumaLOG) corrective regimen sliding scale   SubCutaneous Before meals and at bedtime  insulin lispro Injectable (HumaLOG) 8 Unit(s) SubCutaneous three times a day before meals  levothyroxine Injectable 40 MICROGram(s) IV Push at bedtime  pantoprazole  Injectable 40 milliGRAM(s) IV Push two times a day    MEDICATIONS  (PRN):  dextrose 40% Gel 15 Gram(s) Oral once PRN Blood Glucose LESS THAN 70 milliGRAM(s)/deciliter  glucagon  Injectable 1 milliGRAM(s) IntraMuscular once PRN Glucose LESS THAN 70 milligrams/deciliter  oxycodone    5 mG/acetaminophen 325 mG 1 Tablet(s) Oral every 6 hours PRN Severe Pain (7 - 10)    PAST MEDICAL & SURGICAL HISTORY:  Anemia  ETOH abuse  HLD (hyperlipidemia)  HTN (hypertension)  DM (diabetes mellitus)  No significant past surgical history    FAMILY HISTORY:    SOCIAL HISTORY:  [place]: [ ] Current, [ ] Former, [ ] Never; Pack Years:  Alcohol:  Illicit Drugs:    REVIEW OF SYSTEMS:  CONSTITUTIONAL: No weakness, fevers or chills  HEENT: No visual changes; No vertigo or throat pain   NECK: No pain or stiffness  RESPIRATORY: No cough, wheezing, hemoptysis; No shortness of breath  CARDIOVASCULAR: No chest pain or palpitations  GASTROINTESTINAL: No abdominal or epigastric pain. No nausea, vomiting, or hematemesis; No diarrhea or constipation. No melena or hematochezia.  GENITOURINARY: No dysuria, frequency or hematuria  NEUROLOGICAL: No numbness or weakness  SKIN: No itching, burning, rashes, or lesions   All other 10 review of systems is negative unless indicated above.    Vital Signs Last 24 Hrs  T(C): 36.9 (15 Apr 2020 08:36), Max: 36.9 (14 Apr 2020 17:00)  T(F): 98.4 (15 Apr 2020 08:36), Max: 98.5 (14 Apr 2020 17:00)  HR: 79 (15 Apr 2020 08:36) (79 - 90)  BP: 116/58 (15 Apr 2020 08:36) (116/58 - 155/68)  BP(mean): --  RR: 18 (15 Apr 2020 08:36) (17 - 20)  SpO2: 99% (15 Apr 2020 08:36) (94% - 99%)    04-14 @ 07:01  -  04-15 @ 07:00  --------------------------------------------------------  IN: 1250 mL / OUT: 1900 mL / NET: -650 mL    04-15 @ 07:01  -  04-15 @ 12:32  --------------------------------------------------------  IN: 350 mL / OUT: 70 mL / NET: 280 mL        PHYSICAL EXAM:    General: Well developed; well nourished; in no acute distress  Eyes: Anicteric sclerae, moist conjunctivae  HENT: Moist mucous membranes  Neck: Trachea midline, supple  Lungs: Normal respiratory effors and no intercostal retractions  Cardiovascular: RRR  Abdomen: Soft, non-tender non-distended; Normal bowel sounds; No rebound or guarding  Extremities: Normal range of motion, No clubbing, cyanosis or edema  Neurological: Alert and oriented x3  Skin: Warm and dry. No obvious rash    LABS:                        7.1    10.49 )-----------( 435      ( 14 Apr 2020 07:28 )             21.9     04-14    129<L>  |  103  |  15  ----------------------------<  195<H>  4.0   |  17<L>  |  3.08<H>    Ca    7.1<L>      14 Apr 2020 07:28  Phos  4.1     04-14  Mg     1.9     04-14    TPro  5.9<L>  /  Alb  2.2<L>  /  TBili  0.4  /  DBili  x   /  AST  30  /  ALT  31  /  AlkPhos  105  04-14        PT/INR - ( 15 Apr 2020 08:55 )   PT: 15.0 sec;   INR: 1.31          PTT - ( 15 Apr 2020 08:55 )  PTT:29.0 sec    Culture - Blood (collected 13 Apr 2020 02:36)  Source: .Blood Blood  Preliminary Report (15 Apr 2020 03:00):    No growth at 2 days.    Culture - Blood (collected 13 Apr 2020 02:36)  Source: .Blood Blood-Venous  Preliminary Report (15 Apr 2020 03:00):    No growth at 2 days.      RADIOLOGY & ADDITIONAL STUDIES:

## 2020-04-15 NOTE — CONSULT NOTE ADULT - ASSESSMENT
59 y/o M with PMH of HTN, DM complicated by neuropathy, EtOH liver cirrhosis, presenting w/ pneumatosis of unknown etiology, s/p sub total colectomy and end ileostomy on 3/31, c/b fever and purulent wound infection (4/9) now s/p paracentesis 4/13. Nephrology consulted for worsening of renal function.      # Nonoliguric MEG, baseline Cr 0.7  - likely multifactorial, pre-renal vs intrinsic   - send ulytes, urinalysis  - send for renal sono/bladder scan  - avoid ACE/ARB/NSAIDS  - renal dosing of antibiotics/meds 59 y/o M with PMH of HTN, DM complicated by neuropathy, EtOH liver cirrhosis, presenting w/ pneumatosis of unknown etiology, s/p sub total colectomy and end ileostomy on 3/31, c/b fever and purulent wound infection (4/9) now s/p paracentesis 4/13. Nephrology consulted for worsening of renal function.      # Nonoliguric MEG, baseline Cr 0.7  - Cr trending up to 3.0 today   - likely combination of pre-renal, intrinsic cause due to covid19 infection, contrast exposure on 4/10 vs vancomycin toxicity; urinary retention has to be ruled out as well  - send ulytes, urinalysis  - send for renal sono/bladder scan  - check Vanco random level   - avoid ACE/ARB/NSAIDS/Contrast  - renal dosing of antibiotics/meds  - keep euvolemic, maintain map >65-70 mmHg  - monitor BUN/Cr, lytes, strict I/O  - met acidosis, likely 2/2 MEG, start sodium bicarb 650 mg po q8hr, CO2 goal >20  - renal diet

## 2020-04-16 LAB
ALBUMIN SERPL ELPH-MCNC: 2.3 G/DL — LOW (ref 3.3–5)
ALP SERPL-CCNC: 116 U/L — SIGNIFICANT CHANGE UP (ref 40–120)
ALT FLD-CCNC: 22 U/L — SIGNIFICANT CHANGE UP (ref 10–45)
ANION GAP SERPL CALC-SCNC: 12 MMOL/L — SIGNIFICANT CHANGE UP (ref 5–17)
AST SERPL-CCNC: 27 U/L — SIGNIFICANT CHANGE UP (ref 10–40)
BASOPHILS # BLD AUTO: 0 K/UL — SIGNIFICANT CHANGE UP (ref 0–0.2)
BASOPHILS NFR BLD AUTO: 0 % — SIGNIFICANT CHANGE UP (ref 0–2)
BILIRUB SERPL-MCNC: 0.2 MG/DL — SIGNIFICANT CHANGE UP (ref 0.2–1.2)
BUN SERPL-MCNC: 22 MG/DL — SIGNIFICANT CHANGE UP (ref 7–23)
CALCIUM SERPL-MCNC: 7.5 MG/DL — LOW (ref 8.4–10.5)
CHLORIDE SERPL-SCNC: 100 MMOL/L — SIGNIFICANT CHANGE UP (ref 96–108)
CO2 SERPL-SCNC: 13 MMOL/L — LOW (ref 22–31)
CREAT SERPL-MCNC: 5.12 MG/DL — HIGH (ref 0.5–1.3)
CRP SERPL-MCNC: 19.79 MG/DL — HIGH (ref 0–0.4)
D DIMER BLD IA.RAPID-MCNC: 5062 NG/ML DDU — HIGH
EOSINOPHIL # BLD AUTO: 0 K/UL — SIGNIFICANT CHANGE UP (ref 0–0.5)
EOSINOPHIL NFR BLD AUTO: 0 % — SIGNIFICANT CHANGE UP (ref 0–6)
FERRITIN SERPL-MCNC: 423 NG/ML — HIGH (ref 30–400)
G6PD RBC-CCNC: 20.6 U/G HGB — HIGH (ref 7–20.5)
GLUCOSE BLDC GLUCOMTR-MCNC: 105 MG/DL — HIGH (ref 70–99)
GLUCOSE BLDC GLUCOMTR-MCNC: 83 MG/DL — SIGNIFICANT CHANGE UP (ref 70–99)
GLUCOSE BLDC GLUCOMTR-MCNC: 92 MG/DL — SIGNIFICANT CHANGE UP (ref 70–99)
GLUCOSE SERPL-MCNC: 82 MG/DL — SIGNIFICANT CHANGE UP (ref 70–99)
HCT VFR BLD CALC: 20.8 % — CRITICAL LOW (ref 39–50)
HCT VFR BLD CALC: 27 % — LOW (ref 39–50)
HGB BLD-MCNC: 6.8 G/DL — CRITICAL LOW (ref 13–17)
HGB BLD-MCNC: 9 G/DL — LOW (ref 13–17)
LYMPHOCYTES # BLD AUTO: 0.64 K/UL — LOW (ref 1–3.3)
LYMPHOCYTES # BLD AUTO: 4.5 % — LOW (ref 13–44)
MAGNESIUM SERPL-MCNC: 1.8 MG/DL — SIGNIFICANT CHANGE UP (ref 1.6–2.6)
MCHC RBC-ENTMCNC: 28.7 PG — SIGNIFICANT CHANGE UP (ref 27–34)
MCHC RBC-ENTMCNC: 28.7 PG — SIGNIFICANT CHANGE UP (ref 27–34)
MCHC RBC-ENTMCNC: 32.7 GM/DL — SIGNIFICANT CHANGE UP (ref 32–36)
MCHC RBC-ENTMCNC: 33.3 GM/DL — SIGNIFICANT CHANGE UP (ref 32–36)
MCV RBC AUTO: 86 FL — SIGNIFICANT CHANGE UP (ref 80–100)
MCV RBC AUTO: 87.8 FL — SIGNIFICANT CHANGE UP (ref 80–100)
MONOCYTES # BLD AUTO: 0.91 K/UL — HIGH (ref 0–0.9)
MONOCYTES NFR BLD AUTO: 6.4 % — SIGNIFICANT CHANGE UP (ref 2–14)
NEUTROPHILS # BLD AUTO: 12.37 K/UL — HIGH (ref 1.8–7.4)
NEUTROPHILS NFR BLD AUTO: 87.3 % — HIGH (ref 43–77)
NRBC # BLD: 0 /100 WBCS — SIGNIFICANT CHANGE UP (ref 0–0)
OB PNL STL: POSITIVE
PHOSPHATE SERPL-MCNC: 5.2 MG/DL — HIGH (ref 2.5–4.5)
PLATELET # BLD AUTO: 429 K/UL — HIGH (ref 150–400)
PLATELET # BLD AUTO: 430 K/UL — HIGH (ref 150–400)
POTASSIUM SERPL-MCNC: 4.3 MMOL/L — SIGNIFICANT CHANGE UP (ref 3.5–5.3)
POTASSIUM SERPL-SCNC: 4.3 MMOL/L — SIGNIFICANT CHANGE UP (ref 3.5–5.3)
PROCALCITONIN SERPL-MCNC: 0.46 NG/ML — HIGH (ref 0.02–0.1)
PROT SERPL-MCNC: 6.2 G/DL — SIGNIFICANT CHANGE UP (ref 6–8.3)
RBC # BLD: 2.37 M/UL — LOW (ref 4.2–5.8)
RBC # BLD: 3.14 M/UL — LOW (ref 4.2–5.8)
RBC # FLD: 14.4 % — SIGNIFICANT CHANGE UP (ref 10.3–14.5)
RBC # FLD: 14.9 % — HIGH (ref 10.3–14.5)
SODIUM SERPL-SCNC: 125 MMOL/L — LOW (ref 135–145)
THYROGLOB AB FLD IA-ACNC: <20 IU/ML — SIGNIFICANT CHANGE UP
THYROGLOB AB FLD IA-ACNC: <20 IU/ML — SIGNIFICANT CHANGE UP
THYROGLOB AB SERPL-ACNC: <20 IU/ML — SIGNIFICANT CHANGE UP
THYROGLOB AB SERPL-ACNC: <20 IU/ML — SIGNIFICANT CHANGE UP
THYROGLOB SERPL-MCNC: 19.5 NG/ML — SIGNIFICANT CHANGE UP (ref 1.6–59.9)
THYROPEROXIDASE AB SERPL-ACNC: 11.5 IU/ML — SIGNIFICANT CHANGE UP
THYROPEROXIDASE AB SERPL-ACNC: 15.7 IU/ML — SIGNIFICANT CHANGE UP
WBC # BLD: 14.17 K/UL — HIGH (ref 3.8–10.5)
WBC # BLD: 15 K/UL — HIGH (ref 3.8–10.5)
WBC # FLD AUTO: 14.17 K/UL — HIGH (ref 3.8–10.5)
WBC # FLD AUTO: 15 K/UL — HIGH (ref 3.8–10.5)

## 2020-04-16 PROCEDURE — 99233 SBSQ HOSP IP/OBS HIGH 50: CPT | Mod: GC

## 2020-04-16 PROCEDURE — 74176 CT ABD & PELVIS W/O CONTRAST: CPT | Mod: 26

## 2020-04-16 PROCEDURE — 76775 US EXAM ABDO BACK WALL LIM: CPT | Mod: 26

## 2020-04-16 PROCEDURE — 71045 X-RAY EXAM CHEST 1 VIEW: CPT | Mod: 26,77

## 2020-04-16 PROCEDURE — 99232 SBSQ HOSP IP/OBS MODERATE 35: CPT | Mod: GC

## 2020-04-16 PROCEDURE — 71045 X-RAY EXAM CHEST 1 VIEW: CPT | Mod: 26

## 2020-04-16 RX ORDER — SODIUM BICARBONATE 1 MEQ/ML
1300 SYRINGE (ML) INTRAVENOUS THREE TIMES A DAY
Refills: 0 | Status: DISCONTINUED | OUTPATIENT
Start: 2020-04-16 | End: 2020-04-19

## 2020-04-16 RX ORDER — PANTOPRAZOLE SODIUM 20 MG/1
80 TABLET, DELAYED RELEASE ORAL EVERY 12 HOURS
Refills: 0 | Status: DISCONTINUED | OUTPATIENT
Start: 2020-04-16 | End: 2020-04-22

## 2020-04-16 RX ORDER — TRAZODONE HCL 50 MG
50 TABLET ORAL AT BEDTIME
Refills: 0 | Status: DISCONTINUED | OUTPATIENT
Start: 2020-04-16 | End: 2020-04-18

## 2020-04-16 RX ORDER — LEVOTHYROXINE SODIUM 125 MCG
50 TABLET ORAL DAILY
Refills: 0 | Status: DISCONTINUED | OUTPATIENT
Start: 2020-04-16 | End: 2020-04-19

## 2020-04-16 RX ORDER — LIDOCAINE 4 G/100G
1 CREAM TOPICAL ONCE
Refills: 0 | Status: COMPLETED | OUTPATIENT
Start: 2020-04-16 | End: 2020-04-16

## 2020-04-16 RX ORDER — AMLODIPINE BESYLATE 2.5 MG/1
10 TABLET ORAL DAILY
Refills: 0 | Status: DISCONTINUED | OUTPATIENT
Start: 2020-04-16 | End: 2020-04-19

## 2020-04-16 RX ORDER — SODIUM CHLORIDE 9 MG/ML
1000 INJECTION INTRAMUSCULAR; INTRAVENOUS; SUBCUTANEOUS
Refills: 0 | Status: DISCONTINUED | OUTPATIENT
Start: 2020-04-16 | End: 2020-04-16

## 2020-04-16 RX ORDER — IOHEXOL 300 MG/ML
30 INJECTION, SOLUTION INTRAVENOUS ONCE
Refills: 0 | Status: DISCONTINUED | OUTPATIENT
Start: 2020-04-16 | End: 2020-04-16

## 2020-04-16 RX ORDER — ACETAMINOPHEN 500 MG
1000 TABLET ORAL ONCE
Refills: 0 | Status: COMPLETED | OUTPATIENT
Start: 2020-04-16 | End: 2020-04-16

## 2020-04-16 RX ORDER — IOHEXOL 300 MG/ML
30 INJECTION, SOLUTION INTRAVENOUS ONCE
Refills: 0 | Status: COMPLETED | OUTPATIENT
Start: 2020-04-16 | End: 2020-04-16

## 2020-04-16 RX ORDER — ACETAMINOPHEN 500 MG
650 TABLET ORAL EVERY 6 HOURS
Refills: 0 | Status: DISCONTINUED | OUTPATIENT
Start: 2020-04-16 | End: 2020-04-18

## 2020-04-16 RX ADMIN — AMLODIPINE BESYLATE 10 MILLIGRAM(S): 2.5 TABLET ORAL at 06:16

## 2020-04-16 RX ADMIN — ATORVASTATIN CALCIUM 10 MILLIGRAM(S): 80 TABLET, FILM COATED ORAL at 22:17

## 2020-04-16 RX ADMIN — Medication 400 MILLIGRAM(S): at 22:16

## 2020-04-16 RX ADMIN — INSULIN GLARGINE 8 UNIT(S): 100 INJECTION, SOLUTION SUBCUTANEOUS at 22:18

## 2020-04-16 RX ADMIN — Medication 50 MILLIGRAM(S): at 22:17

## 2020-04-16 RX ADMIN — Medication 75 MICROGRAM(S): at 06:16

## 2020-04-16 RX ADMIN — Medication 650 MILLIGRAM(S): at 14:03

## 2020-04-16 RX ADMIN — PANTOPRAZOLE SODIUM 80 MILLIGRAM(S): 20 TABLET, DELAYED RELEASE ORAL at 17:06

## 2020-04-16 RX ADMIN — Medication 650 MILLIGRAM(S): at 06:16

## 2020-04-16 RX ADMIN — Medication 650 MILLIGRAM(S): at 06:41

## 2020-04-16 RX ADMIN — AMLODIPINE BESYLATE 10 MILLIGRAM(S): 2.5 TABLET ORAL at 11:45

## 2020-04-16 RX ADMIN — PANTOPRAZOLE SODIUM 40 MILLIGRAM(S): 20 TABLET, DELAYED RELEASE ORAL at 06:16

## 2020-04-16 RX ADMIN — GABAPENTIN 100 MILLIGRAM(S): 400 CAPSULE ORAL at 22:18

## 2020-04-16 RX ADMIN — Medication 1300 MILLIGRAM(S): at 14:03

## 2020-04-16 RX ADMIN — Medication 1300 MILLIGRAM(S): at 22:17

## 2020-04-16 RX ADMIN — LIDOCAINE 1 PATCH: 4 CREAM TOPICAL at 22:21

## 2020-04-16 RX ADMIN — IOHEXOL 30 MILLILITER(S): 300 INJECTION, SOLUTION INTRAVENOUS at 14:05

## 2020-04-16 NOTE — PROGRESS NOTE ADULT - SUBJECTIVE AND OBJECTIVE BOX
INTERVAL HPI/OVERNIGHT EVENTS: NAEO. This AM pt is feeling well. He denies any n/v/cp/sob. Reports gas and stool output in ostomy.     STATUS POST:  ex lap, subtotal colectomy/IR paracentesis    POST OPERATIVE DAY #: 16/3    MEDICATIONS  (STANDING):  amLODIPine   Tablet 10 milliGRAM(s) Oral daily  atorvastatin 10 milliGRAM(s) Oral at bedtime  azithromycin  IVPB 250 milliGRAM(s) IV Intermittent every 24 hours  azithromycin  IVPB      dextrose 5%. 1000 milliLiter(s) (50 mL/Hr) IV Continuous <Continuous>  dextrose 50% Injectable 12.5 Gram(s) IV Push once  dextrose 50% Injectable 25 Gram(s) IV Push once  dextrose 50% Injectable 25 Gram(s) IV Push once  gabapentin 100 milliGRAM(s) Oral at bedtime  hydroxychloroquine 400 milliGRAM(s) Oral every 24 hours  hydroxychloroquine   Oral   insulin glargine Injectable (LANTUS) 8 Unit(s) SubCutaneous at bedtime  insulin lispro (HumaLOG) corrective regimen sliding scale   SubCutaneous Before meals and at bedtime  insulin lispro Injectable (HumaLOG) 7 Unit(s) SubCutaneous three times a day before meals  levothyroxine 75 MICROGram(s) Oral daily  pantoprazole  Injectable 40 milliGRAM(s) IV Push two times a day  sodium bicarbonate 650 milliGRAM(s) Oral three times a day  sodium chloride 0.9%. 1000 milliLiter(s) (100 mL/Hr) IV Continuous <Continuous>    MEDICATIONS  (PRN):  acetaminophen   Tablet .. 650 milliGRAM(s) Oral every 6 hours PRN Mild Pain (1 - 3)  dextrose 40% Gel 15 Gram(s) Oral once PRN Blood Glucose LESS THAN 70 milliGRAM(s)/deciliter  glucagon  Injectable 1 milliGRAM(s) IntraMuscular once PRN Glucose LESS THAN 70 milligrams/deciliter      Vital Signs Last 24 Hrs  T(C): 36.5 (16 Apr 2020 08:18), Max: 36.9 (15 Apr 2020 17:00)  T(F): 97.7 (16 Apr 2020 08:18), Max: 98.5 (15 Apr 2020 17:00)  HR: 84 (16 Apr 2020 08:18) (80 - 86)  BP: 153/69 (16 Apr 2020 08:18) (138/63 - 155/61)  BP(mean): --  RR: 19 (16 Apr 2020 08:18) (17 - 19)  SpO2: 97% (16 Apr 2020 08:18) (95% - 97%)    PHYSICAL EXAM:      Constitutional: A&Ox3    Respiratory: non labored breathing, no respiratory distress    Cardiovascular: NSR, RRR    Gastrointestinal: abdomen is soft, distended, non-tender                 Incision: incision is clean, granulation tissue and fibrinous material present, fascia intact, no purulence or drainage. WTD dressing changed at bedside. Ostomy with gas and dark green output.     Extremities: (-) edema                  I&O's Detail    15 Apr 2020 07:01  -  16 Apr 2020 07:00  --------------------------------------------------------  IN:    Oral Fluid: 1050 mL    Sodium Chloride 0.9% IV Bolus: 1000 mL    sodium chloride 0.9%.: 300 mL  Total IN: 2350 mL    OUT:    Ileostomy: 300 mL    Voided: 320 mL  Total OUT: 620 mL    Total NET: 1730 mL      16 Apr 2020 07:01  -  16 Apr 2020 09:04  --------------------------------------------------------  IN:  Total IN: 0 mL    OUT:    Voided: 150 mL  Total OUT: 150 mL    Total NET: -150 mL          LABS:                        6.8    14.17 )-----------( 429      ( 16 Apr 2020 06:18 )             20.8     04-16    125<L>  |  100  |  22  ----------------------------<  82  4.3   |  13<L>  |  5.12<H>    Ca    7.5<L>      16 Apr 2020 06:18  Phos  5.2     04-16  Mg     1.8     04-16    TPro  6.2  /  Alb  2.3<L>  /  TBili  0.2  /  DBili  x   /  AST  27  /  ALT  22  /  AlkPhos  116  04-16    PT/INR - ( 15 Apr 2020 08:55 )   PT: 15.0 sec;   INR: 1.31          PTT - ( 15 Apr 2020 08:55 )  PTT:29.0 sec  Urinalysis Basic - ( 15 Apr 2020 12:28 )    Color: Yellow / Appearance: Clear / SG: <=1.005 / pH: x  Gluc: x / Ketone: NEGATIVE  / Bili: Negative / Urobili: 0.2 E.U./dL   Blood: x / Protein: 30 mg/dL / Nitrite: NEGATIVE   Leuk Esterase: NEGATIVE / RBC: < 5 /HPF / WBC < 5 /HPF   Sq Epi: x / Non Sq Epi: 0-5 /HPF / Bacteria: Present /HPF        RADIOLOGY & ADDITIONAL STUDIES:

## 2020-04-16 NOTE — PROGRESS NOTE ADULT - ASSESSMENT
61 y/o M with PMH of HTN, DM complicated by neuropathy, EtOH liver cirrhosis, presenting w/ pneumatosis of unknown etiology, s/p sub total colectomy and end ileostomy on 3/31, c/b fever and purulent wound infection (4/9), ascites now s/p paracentesis 4/13. Course further complicated by worsening MEG,  anemia, and COVID+     ETOH cirrhosis- MELD NA 25  Ascites: s/p para on 4/10, 140 cc sent. Cytology negative, but not sent for cell count/culture. Patient has been on zosyn for 2 weeks so doubt SBP  HE: on lactulose at home, currently oriented. Recommend continuing with lactulose, goal 2 to 3 BMs a day  Varices: unkown. H/h dwontrending but no signs of active bleeding at this time. Recommend EGD as outpatient to evaluate  HCC: no lession seen on CT, recommend screening q6 months  MEG: Creatine continues to rise. - rule out retention, also possibly pre -renal vs atn from vanc, contrast. Hepatorenal is a diagnosis of exlusion  Anemia: Hemoglobin 6.8 today. no signs of active bleeding. Transfuse and recommend EGD, cscope as outpatient. 61 y/o M with PMH of HTN, DM complicated by neuropathy, EtOH liver cirrhosis, presenting w/ pneumatosis of unknown etiology, s/p sub total colectomy and end ileostomy on 3/31, c/b fever and purulent wound infection (4/9), ascites now s/p paracentesis 4/13. Course further complicated by worsening MEG,  anemia, and COVID+     ETOH cirrhosis- MELD NA 25  Ascites: s/p para on 4/10, 140 cc sent. Cytology negative, but not sent for cell count/culture. Patient has been on zosyn for 2 weeks so doubt SBP  HE: on lactulose at home, currently oriented. Recommend continuing with lactulose, goal 2 to 3 BMs a day  Varices: unkown. H/h dwontrending but no signs of active bleeding at this time. Recommend EGD as outpatient to evaluate  HCC: no lession seen on CT, recommend screening q6 months  MEG: Creatine continues to rise. - renal us shows no hydro, also possibly pre -renal vs atn from vanc, contrast. Hepatorenal is a diagnosis of exlusion- recommend Albumin challenge 1g/kg IV x2 days  Anemia: Hemoglobin 6.8 today. no signs of active bleeding. Transfuse and recommend EGD, cscope as outpatient.

## 2020-04-16 NOTE — PROGRESS NOTE ADULT - SUBJECTIVE AND OBJECTIVE BOX
Primary Diagnosis: COVID-19 associated pneumonia    SUBJECTIVE / HPI:  60y Male with PMH of HTN, DM complicated by neuropathy, EtOH liver cirrhosis, who presents to [facility] [place] for fall at home. At [facility] patient reported that he takes lactulose at home daily and has daily BM, but he has had no BM for the last 5 days. At [facility] labs concerning for WBC 19, Cr 2.28, Lactates 8.6 (6.8 after 2L of IVF)CT showing distended colon with small area of pneumatosis. On arrival patient was stable, denied abdominal pain, said he has had 5 liquid/non bloody BM since coming to the ED. Patient says he has had a Cscope in the past but does not remmeber when, he said it was normal.    1 unit prbcs trx 4/13  paracentesis performed, drained 240cc sent for culture 4/13  ID Following    Overnight events: No acute events overnight    4/16 Patient denies new complaints.  He feels hungry.  Denies abdominal pain, SOB, chest pain, calf pain.    I&O's Detail  15 Apr 2020 07:01  -  16 Apr 2020 07:00  --------------------------------------------------------  IN:    Oral Fluid: 1050 mL    sodium chloride 0.9%: 300 mL    Sodium Chloride 0.9% IV Bolus: 1000 mL  Total IN: 2350 mL    OUT:    Ileostomy: 300 mL    Voided: 320 mL  Total OUT: 620 mL    Total NET: 1730 mL      16 Apr 2020 07:01  -  16 Apr 2020 11:50  --------------------------------------------------------  IN:  Total IN: 0 mL    OUT:    Voided: 150 mL  Total OUT: 150 mL    Total NET: -150 mL    MEDICATIONS  (STANDING):  amLODIPine   Tablet 10 milliGRAM(s) Oral daily  atorvastatin 10 milliGRAM(s) Oral at bedtime  dextrose 5%. 1000 milliLiter(s) (50 mL/Hr) IV Continuous <Continuous>  dextrose 50% Injectable 12.5 Gram(s) IV Push once  dextrose 50% Injectable 25 Gram(s) IV Push once  dextrose 50% Injectable 25 Gram(s) IV Push once  gabapentin 100 milliGRAM(s) Oral at bedtime  insulin glargine Injectable (LANTUS) 8 Unit(s) SubCutaneous at bedtime  insulin lispro (HumaLOG) corrective regimen sliding scale   SubCutaneous Before meals and at bedtime  insulin lispro Injectable (HumaLOG) 7 Unit(s) SubCutaneous three times a day before meals  levothyroxine 75 MICROGram(s) Oral daily  pantoprazole  Injectable 80 milliGRAM(s) IV Push every 12 hours  sodium bicarbonate 1300 milliGRAM(s) Oral three times a day    MEDICATIONS  (PRN):  acetaminophen   Tablet .. 650 milliGRAM(s) Oral every 6 hours PRN Mild Pain (1 - 3)  dextrose 40% Gel 15 Gram(s) Oral once PRN Blood Glucose LESS THAN 70 milliGRAM(s)/deciliter  glucagon  Injectable 1 milliGRAM(s) IntraMuscular once PRN Glucose LESS THAN 70 milligrams/deciliter    Exam: Gen NAD, comfortable  HEENT: NC/AT, PERRLA, anicteric, neck supple, FROM, NT  Neuro: Non focal, A&OX3  pulm: RA 99 %  CV: NSR, normotensive  Abd: Distended, mildly tender. wound C/D/I  Ext: No C/C/E, no calf tenderness  : [place] cath/voiding.    Labs:              6.8    14.17 )-----------( 429      ( 16 Apr 2020 06:18 )             20.8   04-16    125<L>  |  100  |  22  ----------------------------<  82  4.3   |  13<L>  |  5.12<H>    Ca    7.5<L>      16 Apr 2020 06:18  Phos  5.2     04-16  Mg     1.8     04-16    TPro  6.2  /  Alb  2.3<L>  /  TBili  0.2  /  DBili  x   /  AST  27  /  ALT  22  /  AlkPhos  116  04-16    D-dimer 5k    MEDICATIONS  (STANDING):  amLODIPine   Tablet 10 milliGRAM(s) Oral daily  aspirin enteric coated 81 milliGRAM(s) Oral daily  atorvastatin 10 milliGRAM(s) Oral at bedtime  azithromycin  IVPB 250 milliGRAM(s) IV Intermittent every 24 hours  azithromycin  IVPB      dextrose 5%. 1000 milliLiter(s) (50 mL/Hr) IV Continuous <Continuous>  dextrose 50% Injectable 12.5 Gram(s) IV Push once  dextrose 50% Injectable 25 Gram(s) IV Push once  dextrose 50% Injectable 25 Gram(s) IV Push once  gabapentin 100 milliGRAM(s) Oral every 6 hours  hydroxychloroquine 400 milliGRAM(s) Oral every 24 hours  hydroxychloroquine   Oral   insulin glargine Injectable (LANTUS) 12 Unit(s) SubCutaneous at bedtime  insulin lispro (HumaLOG) corrective regimen sliding scale   SubCutaneous Before meals and at bedtime  insulin lispro Injectable (HumaLOG) 8 Unit(s) SubCutaneous three times a day before meals  levothyroxine Injectable 40 MICROGram(s) IV Push at bedtime  loperamide 2 milliGRAM(s) Oral every 6 hours  pantoprazole  Injectable 40 milliGRAM(s) IV Push two times a day    MEDICATIONS  (PRN):  acetaminophen   Tablet .. 650 milliGRAM(s) Oral every 6 hours PRN Temp greater or equal to 38C (100.4F), Mild Pain (1 - 3)  dextrose 40% Gel 15 Gram(s) Oral once PRN Blood Glucose LESS THAN 70 milliGRAM(s)/deciliter  glucagon  Injectable 1 milliGRAM(s) IntraMuscular once PRN Glucose LESS THAN 70 milligrams/deciliter  hydrALAZINE Injectable 10 milliGRAM(s) IV Push every 4 hours PRN SBP >170  labetalol Injectable 20 milliGRAM(s) IV Push every 6 hours PRN 2nd line drug, Systolic blood pressure > 170  ondansetron Injectable 4 milliGRAM(s) IV Push every 6 hours PRN Nausea and/or Vomiting  oxycodone    5 mG/acetaminophen 325 mG 1 Tablet(s) Oral every 6 hours PRN Severe Pain (7 - 10)  traZODone 50 milliGRAM(s) Oral at bedtime PRN insomnia    Imaging: < from: Xray Chest 1 View- PORTABLE-Routine (04.16.20 @ 03:25) >  There is increased hazy airspace opacity bilaterally. Central markings are prominent as well. Findings could reflect pulmonary edema given the clinical question. Progression of pneumonia is certainly possible. Obscuration of the costophrenic angles may reflect small pleural effusions. No visible pneumothorax. The cardiomediastinal silhouette is again magnified.    IMPRESSION:   Increased bilateral airspace opacity which may be worsening of pulmonary edema versus pneumonia.    < end of copied text >    A/P:   This is a 61 y/o M with PMH of HTN, DM complicated by neuropathy, EtOH liver cirrhosis, who presents to [facility] for fall at home.   At [facility] patient reported that he takes lactulose at home daily and has daily BM, but he has had no BM for the last 5 days.   At [facility] labs concerning for WBC 19, Cr 2.28, Lactates 8.6 (6.8 after 2L of IVF)  CT showing distended colon with small area of pneumatosis. Patient underwent ex lap and subtotal colectomy, and ileostomy. 3/31, he initially tested negative for covid 19, but subsequent test was positive, patient was transferred to tele covid unit, but has not required supplemental oxygen, he remains on RA O2 sat 99%, afebrile.  ID is following and does not think he needs to be on zosyn, recommends GI consult to assess for hepato renal syndrome in setting of up trending RFT's.    PLAN:  NEURO: No active issues    CARDIOVASCULAR: Continue current care    PULMONARY: RA, O2 99%   - was started on plaquenil, azithro for +covid, 4/13-4/17, stopped today as risk of renal injury worsening outweighs benefit as per attending Dr. Montgomery    RENAL: MEG on CKD, nephrology following started Na+ bicarb tabs  - NS @ 100 x 10 hours continued today  - f/u daily renal recs    GI: as per GI, EGD as outpatient  - CT Abd/Pelvis as per surgery today, hold contrast in light of MEG    HEME:   - hgb 6.8 today, transfuse 1 uprbc    ID: No active signs of infection, afebrile, continue to monitor   - wbc 10->14, monitor    ENDO: Normoglycemic    DVT PROPHYLAXIS:  [x] Venodynes                                [] Heparin/Lovenox - hold due to dropping h/h, presumed gi bleed    Dispo: Continue tele covid, full code status.    All above d/w attending Dr. Montgomery.

## 2020-04-16 NOTE — PROGRESS NOTE ADULT - ASSESSMENT
59 y/o M with PMH of HTN, DM complicated by neuropathy, EtOH liver cirrhosis, presenting w/ pneumatosis of unknown etiology, s/p sub total colectomy and end ileostomy on 3/31, c/b fever and purulent wound infection (4/9) now s/p paracentesis 4/13    Neuro: Pain/Nausea control  CV: HDS but hypertensive, lisinopril 40 mg and amlodipine 10mg, Labetalol 20 mg PRN BP >170, hydralazine 10 mg prn  Pulm: CED RAMIREZ: CLD, Ileostomy pink, ostomy functioning, received ostomy teaching.  : Voids, urinating in bed- inacurrate I/Os  ID: Zosyn (4/9-), vanc (4/9-4/13), azithro 4/13-, plaquenil 4/13  Endo: ISS, lantus 7, synthroid  PPx: SCDs, SQH, OOBA  PT/OT: initial evaluation 4/3  Wounds: wet to dry dressing in place

## 2020-04-16 NOTE — PROGRESS NOTE ADULT - ATTENDING COMMENTS
No signs of active bleed.  Continue to monitor Hgb, for signs of a bleed.  EGD and a colonoscopy as outpt to investigate anemia.

## 2020-04-16 NOTE — PROGRESS NOTE ADULT - ASSESSMENT
MEG:  Worsening BUN/Creat.  , with additional losses from ileostomy.  K ok at 4.3.  Our concern for today is the metabolic acidosis, bicarb 13.   Given the patient's drop in serum Na to 125 while on isotonic NS, would not give bicarb drip at this time, for fear that it would drop further.  Would give bicarb po, 1,300mg po tid.    No acute indication for RRT.  Must monitor labs, UO closely.

## 2020-04-16 NOTE — PROGRESS NOTE ADULT - SUBJECTIVE AND OBJECTIVE BOX
No acute events    PERTINENT REVIEW OF SYSTEMS:  CONSTITUTIONAL: No weakness, fevers or chills  HEENT: No visual changes; No vertigo or throat pain   GASTROINTESTINAL: No abdominal or epigastric pain. No nausea, vomiting, or hematemesis; No diarrhea or constipation. No melena or hematochezia.  NEUROLOGICAL: No numbness or weakness  SKIN: No itching, burning, rashes, or lesions     Allergies    No Known Allergies    Intolerances      MEDICATIONS:  MEDICATIONS  (STANDING):  amLODIPine   Tablet 10 milliGRAM(s) Oral daily  atorvastatin 10 milliGRAM(s) Oral at bedtime  azithromycin  IVPB 250 milliGRAM(s) IV Intermittent every 24 hours  azithromycin  IVPB      dextrose 5%. 1000 milliLiter(s) (50 mL/Hr) IV Continuous <Continuous>  dextrose 50% Injectable 12.5 Gram(s) IV Push once  dextrose 50% Injectable 25 Gram(s) IV Push once  dextrose 50% Injectable 25 Gram(s) IV Push once  gabapentin 100 milliGRAM(s) Oral at bedtime  hydroxychloroquine 400 milliGRAM(s) Oral every 24 hours  hydroxychloroquine   Oral   insulin glargine Injectable (LANTUS) 8 Unit(s) SubCutaneous at bedtime  insulin lispro (HumaLOG) corrective regimen sliding scale   SubCutaneous Before meals and at bedtime  insulin lispro Injectable (HumaLOG) 7 Unit(s) SubCutaneous three times a day before meals  levothyroxine 75 MICROGram(s) Oral daily  pantoprazole  Injectable 40 milliGRAM(s) IV Push two times a day  sodium bicarbonate 650 milliGRAM(s) Oral three times a day  sodium chloride 0.9%. 1000 milliLiter(s) (100 mL/Hr) IV Continuous <Continuous>  sodium chloride 0.9%. 1000 milliLiter(s) (100 mL/Hr) IV Continuous <Continuous>    MEDICATIONS  (PRN):  acetaminophen   Tablet .. 650 milliGRAM(s) Oral every 6 hours PRN Mild Pain (1 - 3)  dextrose 40% Gel 15 Gram(s) Oral once PRN Blood Glucose LESS THAN 70 milliGRAM(s)/deciliter  glucagon  Injectable 1 milliGRAM(s) IntraMuscular once PRN Glucose LESS THAN 70 milligrams/deciliter    Vital Signs Last 24 Hrs  T(C): 36.5 (16 Apr 2020 08:18), Max: 36.9 (15 Apr 2020 17:00)  T(F): 97.7 (16 Apr 2020 08:18), Max: 98.5 (15 Apr 2020 17:00)  HR: 84 (16 Apr 2020 08:18) (80 - 86)  BP: 153/69 (16 Apr 2020 08:18) (138/63 - 155/61)  BP(mean): --  RR: 19 (16 Apr 2020 08:18) (17 - 19)  SpO2: 97% (16 Apr 2020 08:18) (95% - 97%)    04-15 @ 07:01  -  04-16 @ 07:00  --------------------------------------------------------  IN: 2350 mL / OUT: 620 mL / NET: 1730 mL    04-16 @ 07:01  -  04-16 @ 09:27  --------------------------------------------------------  IN: 0 mL / OUT: 150 mL / NET: -150 mL      PHYSICAL EXAM:    General: Well developed; well nourished; in no acute distress  HEENT: MMM, conjunctiva and sclera clear  Gastrointestinal: Soft non-tender non-distended; Normal bowel sounds; No hepatosplenomegaly. No rebound or guarding  Skin: Warm and dry. No obvious rash    LABS:                        6.8    14.17 )-----------( 429      ( 16 Apr 2020 06:18 )             20.8     04-16    125<L>  |  100  |  22  ----------------------------<  82  4.3   |  13<L>  |  5.12<H>    Ca    7.5<L>      16 Apr 2020 06:18  Phos  5.2     04-16  Mg     1.8     04-16    TPro  6.2  /  Alb  2.3<L>  /  TBili  0.2  /  DBili  x   /  AST  27  /  ALT  22  /  AlkPhos  116  04-16    PT/INR - ( 15 Apr 2020 08:55 )   PT: 15.0 sec;   INR: 1.31          PTT - ( 15 Apr 2020 08:55 )  PTT:29.0 sec      Urinalysis Basic - ( 15 Apr 2020 12:28 )    Color: Yellow / Appearance: Clear / SG: <=1.005 / pH: x  Gluc: x / Ketone: NEGATIVE  / Bili: Negative / Urobili: 0.2 E.U./dL   Blood: x / Protein: 30 mg/dL / Nitrite: NEGATIVE   Leuk Esterase: NEGATIVE / RBC: < 5 /HPF / WBC < 5 /HPF   Sq Epi: x / Non Sq Epi: 0-5 /HPF / Bacteria: Present /HPF                RADIOLOGY & ADDITIONAL STUDIES:

## 2020-04-16 NOTE — PROGRESS NOTE ADULT - ATTENDING COMMENTS
Agree with above  Pt remains on CLD  remains on RA  Due to the nature of this patient’s COVID-19 isolation status (either confirmed or suspected), this note was prepared without a bedside physical examination to prevent spread of infection and to conserve personal protective equipment during this nationwide pandemic. Objective data (vital signs, urine output, lab results, imaging studies, medications, etc) were reviewed in detail. Physical examinations have been limited only to patients for whom it is required for medical decision making.    Hgb 6.8 noted  FOBT + noted  negative thyroid antibodies  can continue lipitor 10  levothyroxine PO 50mcg once daily  lantus 8 at bedtime, lispro 7 tid with meals

## 2020-04-16 NOTE — PROGRESS NOTE ADULT - SUBJECTIVE AND OBJECTIVE BOX
INTERVAL HPI/OVERNIGHT EVENTS:    Patient is a 60y old  Male who presents with a chief complaint of Covid + (15 Apr 2020 10:11)      Pt reports the following symptoms:    CONSTITUTIONAL:  Negative fever or chills, feels well, good appetite  EYES:  Negative  blurry vision or double vision  CARDIOVASCULAR:  Negative for chest pain or palpitations  RESPIRATORY:  Negative for cough, wheezing, or SOB   GASTROINTESTINAL:  Negative for nausea, vomiting, diarrhea, constipation, or abdominal pain  GENITOURINARY:  Negative frequency, urgency or dysuria  NEUROLOGIC:  No headache, confusion, dizziness, lightheadedness    MEDICATIONS  (STANDING):  amLODIPine   Tablet 10 milliGRAM(s) Oral daily  atorvastatin 10 milliGRAM(s) Oral at bedtime  dextrose 5%. 1000 milliLiter(s) (50 mL/Hr) IV Continuous <Continuous>  dextrose 50% Injectable 12.5 Gram(s) IV Push once  dextrose 50% Injectable 25 Gram(s) IV Push once  dextrose 50% Injectable 25 Gram(s) IV Push once  gabapentin 100 milliGRAM(s) Oral at bedtime  insulin glargine Injectable (LANTUS) 8 Unit(s) SubCutaneous at bedtime  insulin lispro (HumaLOG) corrective regimen sliding scale   SubCutaneous Before meals and at bedtime  insulin lispro Injectable (HumaLOG) 7 Unit(s) SubCutaneous three times a day before meals  levothyroxine 75 MICROGram(s) Oral daily  pantoprazole  Injectable 80 milliGRAM(s) IV Push every 12 hours  sodium bicarbonate 1300 milliGRAM(s) Oral three times a day    MEDICATIONS  (PRN):  acetaminophen   Tablet .. 650 milliGRAM(s) Oral every 6 hours PRN Mild Pain (1 - 3)  dextrose 40% Gel 15 Gram(s) Oral once PRN Blood Glucose LESS THAN 70 milliGRAM(s)/deciliter  glucagon  Injectable 1 milliGRAM(s) IntraMuscular once PRN Glucose LESS THAN 70 milligrams/deciliter      PHYSICAL EXAM  Vital Signs Last 24 Hrs  T(C): 36.5 (16 Apr 2020 08:18), Max: 36.9 (15 Apr 2020 17:00)  T(F): 97.7 (16 Apr 2020 08:18), Max: 98.5 (15 Apr 2020 17:00)  HR: 84 (16 Apr 2020 08:18) (80 - 86)  BP: 153/69 (16 Apr 2020 08:18) (138/63 - 155/61)  BP(mean): --  RR: 19 (16 Apr 2020 08:18) (17 - 19)  SpO2: 97% (16 Apr 2020 08:18) (95% - 97%)    Constitutional: wn/wd in NAD.   HEENT: NCAT, MMM, OP clear, EOMI, no proptosis or lid retraction  Neck: no thyromegaly or palpable thyroid nodules   Respiratory: lungs CTAB.  Cardiovascular: regular rhythm, normal S1 and S2, no audible murmurs, no peripheral edema  GI: soft, NT/ND, no masses/HSM appreciated.  Neurology: no tremors, DTR 2+  Skin: no visible rashes/lesions  Psychiatric: AAO x 3, normal affect/mood.    LABS:                        6.8    14.17 )-----------( 429      ( 16 Apr 2020 06:18 )             20.8     04-16    125<L>  |  100  |  22  ----------------------------<  82  4.3   |  13<L>  |  5.12<H>    Ca    7.5<L>      16 Apr 2020 06:18  Phos  5.2     04-16  Mg     1.8     04-16    TPro  6.2  /  Alb  2.3<L>  /  TBili  0.2  /  DBili  x   /  AST  27  /  ALT  22  /  AlkPhos  116  04-16    PT/INR - ( 15 Apr 2020 08:55 )   PT: 15.0 sec;   INR: 1.31          PTT - ( 15 Apr 2020 08:55 )  PTT:29.0 sec  Urinalysis Basic - ( 15 Apr 2020 12:28 )    Color: Yellow / Appearance: Clear / SG: <=1.005 / pH: x  Gluc: x / Ketone: NEGATIVE  / Bili: Negative / Urobili: 0.2 E.U./dL   Blood: x / Protein: 30 mg/dL / Nitrite: NEGATIVE   Leuk Esterase: NEGATIVE / RBC: < 5 /HPF / WBC < 5 /HPF   Sq Epi: x / Non Sq Epi: 0-5 /HPF / Bacteria: Present /HPF      Thyroid Stimulating Hormone, Serum: 2.813 uIU/mL (04-14 @ 07:28)  Thyroid Stimulating Hormone, Serum: 2.877 uIU/mL (04-14 @ 00:34)      HbA1C: 6.9 % (04-05 @ 06:59)  7.6 % (03-31 @ 05:58)    CAPILLARY BLOOD GLUCOSE      POCT Blood Glucose.: 105 mg/dL (16 Apr 2020 11:24)  POCT Blood Glucose.: 83 mg/dL (16 Apr 2020 07:12)  POCT Blood Glucose.: 77 mg/dL (15 Apr 2020 21:55)  POCT Blood Glucose.: 76 mg/dL (15 Apr 2020 17:13)      Insulin Sliding Scale requirements X 24 Hours:    RADIOLOGY & ADDITIONAL TESTS:    A/P: 60y Male with history of DM type II presenting for       1.  DM -     Please continue           units lantus at bedtime  / in the morning and        units lispro with meals and lispro moderate / low dose sliding scale 4 times daily with meals and at bedtime.  Please continue consistent carbohydrate diet.      Goal FSG is   Will continue to monitor   For discharge, pt can continue    Pt can follow up at discharge with [facility] by calling  to make an appointment.   Will discuss case with     and update primary team INTERVAL HPI/OVERNIGHT EVENTS:    Patient is a 60y old  Male who presents with a chief complaint of Covid + (15 Apr 2020 10:11)  Spoke to the primary team taking care of the patient.  patient is being scheduled for a repeat CT Abd. he is currently having the contrast - will be NPO for the scan but will be started on diet thereafter. His Hb was 6.8 today  saturating well on room air.  on Clear liquid diet   On levothyroxine  PO  Azithro in D5W.   FSG and insulin administration reviewed.    Pt reports the following symptoms:  CONSTITUTIONAL:  Negative chills  CARDIOVASCULAR:  Negative for chest pain or palpitations  RESPIRATORY:  Negative for cough, wheezing  GASTROINTESTINAL:  Negative for diarrhea, constipation  NEUROLOGIC:  No headache, dizziness, lightheadedness    MEDICATIONS  (STANDING):  amLODIPine   Tablet 10 milliGRAM(s) Oral daily  atorvastatin 10 milliGRAM(s) Oral at bedtime  dextrose 5%. 1000 milliLiter(s) (50 mL/Hr) IV Continuous <Continuous>  dextrose 50% Injectable 12.5 Gram(s) IV Push once  dextrose 50% Injectable 25 Gram(s) IV Push once  dextrose 50% Injectable 25 Gram(s) IV Push once  gabapentin 100 milliGRAM(s) Oral at bedtime  insulin glargine Injectable (LANTUS) 8 Unit(s) SubCutaneous at bedtime  insulin lispro (HumaLOG) corrective regimen sliding scale   SubCutaneous Before meals and at bedtime  insulin lispro Injectable (HumaLOG) 7 Unit(s) SubCutaneous three times a day before meals  levothyroxine 75 MICROGram(s) Oral daily  pantoprazole  Injectable 80 milliGRAM(s) IV Push every 12 hours  sodium bicarbonate 1300 milliGRAM(s) Oral three times a day    MEDICATIONS  (PRN):  acetaminophen   Tablet .. 650 milliGRAM(s) Oral every 6 hours PRN Mild Pain (1 - 3)  dextrose 40% Gel 15 Gram(s) Oral once PRN Blood Glucose LESS THAN 70 milliGRAM(s)/deciliter  glucagon  Injectable 1 milliGRAM(s) IntraMuscular once PRN Glucose LESS THAN 70 milligrams/deciliter      PHYSICAL EXAM  Vital Signs Last 24 Hrs  T(C): 36.5 (16 Apr 2020 08:18), Max: 36.9 (15 Apr 2020 17:00)  T(F): 97.7 (16 Apr 2020 08:18), Max: 98.5 (15 Apr 2020 17:00)  HR: 84 (16 Apr 2020 08:18) (80 - 86)  BP: 153/69 (16 Apr 2020 08:18) (138/63 - 155/61)  BP(mean): --  RR: 19 (16 Apr 2020 08:18) (17 - 19)  SpO2: 97% (16 Apr 2020 08:18) (95% - 97%)    Due to the nature of this patient’s COVID-19 isolation status (either confirmed or suspected), this note was prepared without a bedside physical examination to prevent spread of infection and to conserve personal protective equipment during this nationwide pandemic. If possible, direct patient communication occurred via electronic measures or telephone conversation. Examination highlights were provided by a bedside nurse wearing personal protective equipment and review of pertinent medical records. Objective data (vital signs, urine output, lab results, imaging studies, medications, etc) were reviewed in detail. Face to face visits and physical examination have been limited only to patients for whom it is required for medical decision making.    LABS:                        6.8    14.17 )-----------( 429      ( 16 Apr 2020 06:18 )             20.8     04-16    125<L>  |  100  |  22  ----------------------------<  82  4.3   |  13<L>  |  5.12<H>    Ca    7.5<L>      16 Apr 2020 06:18  Phos  5.2     04-16  Mg     1.8     04-16    TPro  6.2  /  Alb  2.3<L>  /  TBili  0.2  /  DBili  x   /  AST  27  /  ALT  22  /  AlkPhos  116  04-16    PT/INR - ( 15 Apr 2020 08:55 )   PT: 15.0 sec;   INR: 1.31          PTT - ( 15 Apr 2020 08:55 )  PTT:29.0 sec  Urinalysis Basic - ( 15 Apr 2020 12:28 )    Color: Yellow / Appearance: Clear / SG: <=1.005 / pH: x  Gluc: x / Ketone: NEGATIVE  / Bili: Negative / Urobili: 0.2 E.U./dL   Blood: x / Protein: 30 mg/dL / Nitrite: NEGATIVE   Leuk Esterase: NEGATIVE / RBC: < 5 /HPF / WBC < 5 /HPF   Sq Epi: x / Non Sq Epi: 0-5 /HPF / Bacteria: Present /HPF      Thyroid Stimulating Hormone, Serum: 2.813 uIU/mL (04-14 @ 07:28)  Thyroid Stimulating Hormone, Serum: 2.877 uIU/mL (04-14 @ 00:34)      HbA1C: 6.9 % (04-05 @ 06:59)  7.6 % (03-31 @ 05:58)    CAPILLARY BLOOD GLUCOSE      POCT Blood Glucose.: 105 mg/dL (16 Apr 2020 11:24)  POCT Blood Glucose.: 83 mg/dL (16 Apr 2020 07:12)  POCT Blood Glucose.: 77 mg/dL (15 Apr 2020 21:55)  POCT Blood Glucose.: 76 mg/dL (15 Apr 2020 17:13)      Insulin Sliding Scale requirements X 24 Hours:    RADIOLOGY & ADDITIONAL TESTS:    A/P: 59 y/o M with PMH of HTN, DM complicated by neuropathy, EtOH liver cirrhosis, presenting w/ pneumatosis of unknown etiology, s/p sub total colectomy and end ileostomy on 3/31, c/b fever and purulent wound infection (4/9). Found to be covid positive.     1.  DM type 2 - 7% unreliable due to anemia, complicated. Pt has poor appetite  Please continue Lantus 8 units at night  Please continue lispro 7 units before each meal.    Please continue lispro moderate dose sliding scale four times daily with meals and at bedtime  Pt's fingerstick glucose goal is 140-180.     2. Hypothyroidism  - TSH 2.813  -  TPO and antithyroid antibodies negative  - Please change to levothyroxine 50mcg PO - on an empty stomach with sips of water    Will continue to monitor     For discharge, TBD    Pt can follow up at discharge with Dr. Underwood by calling  to make an appointment.   Case d/w with Dr. Underwood and primary team updated.

## 2020-04-17 LAB
ALBUMIN SERPL ELPH-MCNC: 2.3 G/DL — LOW (ref 3.3–5)
ALBUMIN SERPL ELPH-MCNC: 2.3 G/DL — LOW (ref 3.3–5)
ALP SERPL-CCNC: 98 U/L — SIGNIFICANT CHANGE UP (ref 40–120)
ALP SERPL-CCNC: 98 U/L — SIGNIFICANT CHANGE UP (ref 40–120)
ALT FLD-CCNC: 16 U/L — SIGNIFICANT CHANGE UP (ref 10–45)
ALT FLD-CCNC: 17 U/L — SIGNIFICANT CHANGE UP (ref 10–45)
ANION GAP SERPL CALC-SCNC: 16 MMOL/L — SIGNIFICANT CHANGE UP (ref 5–17)
ANION GAP SERPL CALC-SCNC: 16 MMOL/L — SIGNIFICANT CHANGE UP (ref 5–17)
APTT BLD: 31.4 SEC — SIGNIFICANT CHANGE UP (ref 27.5–36.3)
AST SERPL-CCNC: 18 U/L — SIGNIFICANT CHANGE UP (ref 10–40)
AST SERPL-CCNC: 20 U/L — SIGNIFICANT CHANGE UP (ref 10–40)
BASOPHILS # BLD AUTO: 0.03 K/UL — SIGNIFICANT CHANGE UP (ref 0–0.2)
BASOPHILS # BLD AUTO: 0.03 K/UL — SIGNIFICANT CHANGE UP (ref 0–0.2)
BASOPHILS NFR BLD AUTO: 0.2 % — SIGNIFICANT CHANGE UP (ref 0–2)
BASOPHILS NFR BLD AUTO: 0.2 % — SIGNIFICANT CHANGE UP (ref 0–2)
BILIRUB SERPL-MCNC: 0.2 MG/DL — SIGNIFICANT CHANGE UP (ref 0.2–1.2)
BILIRUB SERPL-MCNC: 0.2 MG/DL — SIGNIFICANT CHANGE UP (ref 0.2–1.2)
BUN SERPL-MCNC: 33 MG/DL — HIGH (ref 7–23)
BUN SERPL-MCNC: 33 MG/DL — HIGH (ref 7–23)
CALCIUM SERPL-MCNC: 8 MG/DL — LOW (ref 8.4–10.5)
CALCIUM SERPL-MCNC: 8 MG/DL — LOW (ref 8.4–10.5)
CHLORIDE SERPL-SCNC: 101 MMOL/L — SIGNIFICANT CHANGE UP (ref 96–108)
CHLORIDE SERPL-SCNC: 101 MMOL/L — SIGNIFICANT CHANGE UP (ref 96–108)
CO2 SERPL-SCNC: 11 MMOL/L — LOW (ref 22–31)
CO2 SERPL-SCNC: 12 MMOL/L — LOW (ref 22–31)
CREAT SERPL-MCNC: 6.29 MG/DL — HIGH (ref 0.5–1.3)
CREAT SERPL-MCNC: 6.43 MG/DL — HIGH (ref 0.5–1.3)
CRP SERPL-MCNC: 27.27 MG/DL — HIGH (ref 0–0.4)
CRP SERPL-MCNC: 28.15 MG/DL — HIGH (ref 0–0.4)
CULTURE RESULTS: NO GROWTH — SIGNIFICANT CHANGE UP
CULTURE RESULTS: NO GROWTH — SIGNIFICANT CHANGE UP
D DIMER BLD IA.RAPID-MCNC: 2863 NG/ML DDU — HIGH
D DIMER BLD IA.RAPID-MCNC: 3186 NG/ML DDU — HIGH
EOSINOPHIL # BLD AUTO: 0.08 K/UL — SIGNIFICANT CHANGE UP (ref 0–0.5)
EOSINOPHIL # BLD AUTO: 0.11 K/UL — SIGNIFICANT CHANGE UP (ref 0–0.5)
EOSINOPHIL NFR BLD AUTO: 0.5 % — SIGNIFICANT CHANGE UP (ref 0–6)
EOSINOPHIL NFR BLD AUTO: 0.7 % — SIGNIFICANT CHANGE UP (ref 0–6)
FERRITIN SERPL-MCNC: 499 NG/ML — HIGH (ref 30–400)
GLUCOSE BLDC GLUCOMTR-MCNC: 59 MG/DL — LOW (ref 70–99)
GLUCOSE BLDC GLUCOMTR-MCNC: 64 MG/DL — LOW (ref 70–99)
GLUCOSE BLDC GLUCOMTR-MCNC: 72 MG/DL — SIGNIFICANT CHANGE UP (ref 70–99)
GLUCOSE BLDC GLUCOMTR-MCNC: 82 MG/DL — SIGNIFICANT CHANGE UP (ref 70–99)
GLUCOSE BLDC GLUCOMTR-MCNC: 96 MG/DL — SIGNIFICANT CHANGE UP (ref 70–99)
GLUCOSE BLDC GLUCOMTR-MCNC: 96 MG/DL — SIGNIFICANT CHANGE UP (ref 70–99)
GLUCOSE BLDC GLUCOMTR-MCNC: 98 MG/DL — SIGNIFICANT CHANGE UP (ref 70–99)
GLUCOSE SERPL-MCNC: 59 MG/DL — LOW (ref 70–99)
GLUCOSE SERPL-MCNC: 62 MG/DL — LOW (ref 70–99)
HCT VFR BLD CALC: 22.7 % — LOW (ref 39–50)
HCT VFR BLD CALC: 24.1 % — LOW (ref 39–50)
HGB BLD-MCNC: 7.6 G/DL — LOW (ref 13–17)
HGB BLD-MCNC: 7.9 G/DL — LOW (ref 13–17)
IMM GRANULOCYTES NFR BLD AUTO: 2.9 % — HIGH (ref 0–1.5)
IMM GRANULOCYTES NFR BLD AUTO: 4.3 % — HIGH (ref 0–1.5)
INR BLD: 1.36 — HIGH (ref 0.88–1.16)
LYMPHOCYTES # BLD AUTO: 0.63 K/UL — LOW (ref 1–3.3)
LYMPHOCYTES # BLD AUTO: 0.72 K/UL — LOW (ref 1–3.3)
LYMPHOCYTES # BLD AUTO: 4.2 % — LOW (ref 13–44)
LYMPHOCYTES # BLD AUTO: 4.6 % — LOW (ref 13–44)
MAGNESIUM SERPL-MCNC: 2 MG/DL — SIGNIFICANT CHANGE UP (ref 1.6–2.6)
MAGNESIUM SERPL-MCNC: 2 MG/DL — SIGNIFICANT CHANGE UP (ref 1.6–2.6)
MCHC RBC-ENTMCNC: 28.2 PG — SIGNIFICANT CHANGE UP (ref 27–34)
MCHC RBC-ENTMCNC: 28.6 PG — SIGNIFICANT CHANGE UP (ref 27–34)
MCHC RBC-ENTMCNC: 32.8 GM/DL — SIGNIFICANT CHANGE UP (ref 32–36)
MCHC RBC-ENTMCNC: 33.5 GM/DL — SIGNIFICANT CHANGE UP (ref 32–36)
MCV RBC AUTO: 85.3 FL — SIGNIFICANT CHANGE UP (ref 80–100)
MCV RBC AUTO: 86.1 FL — SIGNIFICANT CHANGE UP (ref 80–100)
MONOCYTES # BLD AUTO: 1.02 K/UL — HIGH (ref 0–0.9)
MONOCYTES # BLD AUTO: 1.09 K/UL — HIGH (ref 0–0.9)
MONOCYTES NFR BLD AUTO: 6.8 % — SIGNIFICANT CHANGE UP (ref 2–14)
MONOCYTES NFR BLD AUTO: 6.9 % — SIGNIFICANT CHANGE UP (ref 2–14)
NEUTROPHILS # BLD AUTO: 12.79 K/UL — HIGH (ref 1.8–7.4)
NEUTROPHILS # BLD AUTO: 13.11 K/UL — HIGH (ref 1.8–7.4)
NEUTROPHILS NFR BLD AUTO: 83.3 % — HIGH (ref 43–77)
NEUTROPHILS NFR BLD AUTO: 85.4 % — HIGH (ref 43–77)
NRBC # BLD: 0 /100 WBCS — SIGNIFICANT CHANGE UP (ref 0–0)
NRBC # BLD: 0 /100 WBCS — SIGNIFICANT CHANGE UP (ref 0–0)
PLATELET # BLD AUTO: 465 K/UL — HIGH (ref 150–400)
PLATELET # BLD AUTO: 469 K/UL — HIGH (ref 150–400)
POTASSIUM SERPL-MCNC: 4.6 MMOL/L — SIGNIFICANT CHANGE UP (ref 3.5–5.3)
POTASSIUM SERPL-MCNC: 4.8 MMOL/L — SIGNIFICANT CHANGE UP (ref 3.5–5.3)
POTASSIUM SERPL-SCNC: 4.6 MMOL/L — SIGNIFICANT CHANGE UP (ref 3.5–5.3)
POTASSIUM SERPL-SCNC: 4.8 MMOL/L — SIGNIFICANT CHANGE UP (ref 3.5–5.3)
PROT SERPL-MCNC: 5.9 G/DL — LOW (ref 6–8.3)
PROT SERPL-MCNC: 6.1 G/DL — SIGNIFICANT CHANGE UP (ref 6–8.3)
PROTHROM AB SERPL-ACNC: 15.6 SEC — HIGH (ref 10–12.9)
RBC # BLD: 2.66 M/UL — LOW (ref 4.2–5.8)
RBC # BLD: 2.8 M/UL — LOW (ref 4.2–5.8)
RBC # FLD: 14.6 % — HIGH (ref 10.3–14.5)
RBC # FLD: 14.7 % — HIGH (ref 10.3–14.5)
SODIUM SERPL-SCNC: 128 MMOL/L — LOW (ref 135–145)
SODIUM SERPL-SCNC: 129 MMOL/L — LOW (ref 135–145)
SPECIMEN SOURCE: SIGNIFICANT CHANGE UP
SPECIMEN SOURCE: SIGNIFICANT CHANGE UP
WBC # BLD: 14.98 K/UL — HIGH (ref 3.8–10.5)
WBC # BLD: 15.74 K/UL — HIGH (ref 3.8–10.5)
WBC # FLD AUTO: 14.98 K/UL — HIGH (ref 3.8–10.5)
WBC # FLD AUTO: 15.74 K/UL — HIGH (ref 3.8–10.5)

## 2020-04-17 PROCEDURE — 71045 X-RAY EXAM CHEST 1 VIEW: CPT | Mod: 26

## 2020-04-17 PROCEDURE — 99232 SBSQ HOSP IP/OBS MODERATE 35: CPT

## 2020-04-17 PROCEDURE — 99232 SBSQ HOSP IP/OBS MODERATE 35: CPT | Mod: GC

## 2020-04-17 PROCEDURE — 99233 SBSQ HOSP IP/OBS HIGH 50: CPT | Mod: GC

## 2020-04-17 RX ORDER — DEXTROSE 50 % IN WATER 50 %
12.5 SYRINGE (ML) INTRAVENOUS ONCE
Refills: 0 | Status: COMPLETED | OUTPATIENT
Start: 2020-04-17 | End: 2020-04-17

## 2020-04-17 RX ORDER — INSULIN LISPRO 100/ML
5 VIAL (ML) SUBCUTANEOUS
Refills: 0 | Status: DISCONTINUED | OUTPATIENT
Start: 2020-04-17 | End: 2020-04-17

## 2020-04-17 RX ORDER — SODIUM CHLORIDE 9 MG/ML
1000 INJECTION, SOLUTION INTRAVENOUS
Refills: 0 | Status: DISCONTINUED | OUTPATIENT
Start: 2020-04-17 | End: 2020-04-18

## 2020-04-17 RX ORDER — INSULIN HUMAN 100 [IU]/ML
INJECTION, SOLUTION SUBCUTANEOUS EVERY 6 HOURS
Refills: 0 | Status: DISCONTINUED | OUTPATIENT
Start: 2020-04-17 | End: 2020-04-22

## 2020-04-17 RX ORDER — ALBUMIN HUMAN 25 %
360 VIAL (ML) INTRAVENOUS ONCE
Refills: 0 | Status: COMPLETED | OUTPATIENT
Start: 2020-04-17 | End: 2020-04-17

## 2020-04-17 RX ORDER — INSULIN LISPRO 100/ML
6 VIAL (ML) SUBCUTANEOUS EVERY 6 HOURS
Refills: 0 | Status: DISCONTINUED | OUTPATIENT
Start: 2020-04-17 | End: 2020-04-17

## 2020-04-17 RX ORDER — INSULIN GLARGINE 100 [IU]/ML
6 INJECTION, SOLUTION SUBCUTANEOUS AT BEDTIME
Refills: 0 | Status: DISCONTINUED | OUTPATIENT
Start: 2020-04-17 | End: 2020-04-22

## 2020-04-17 RX ADMIN — Medication 12.5 GRAM(S): at 12:05

## 2020-04-17 RX ADMIN — ATORVASTATIN CALCIUM 10 MILLIGRAM(S): 80 TABLET, FILM COATED ORAL at 22:27

## 2020-04-17 RX ADMIN — LIDOCAINE 1 PATCH: 4 CREAM TOPICAL at 09:43

## 2020-04-17 RX ADMIN — SODIUM CHLORIDE 60 MILLILITER(S): 9 INJECTION, SOLUTION INTRAVENOUS at 21:21

## 2020-04-17 RX ADMIN — PANTOPRAZOLE SODIUM 80 MILLIGRAM(S): 20 TABLET, DELAYED RELEASE ORAL at 17:31

## 2020-04-17 RX ADMIN — INSULIN GLARGINE 6 UNIT(S): 100 INJECTION, SOLUTION SUBCUTANEOUS at 22:38

## 2020-04-17 RX ADMIN — Medication 1300 MILLIGRAM(S): at 05:45

## 2020-04-17 RX ADMIN — GABAPENTIN 100 MILLIGRAM(S): 400 CAPSULE ORAL at 22:26

## 2020-04-17 RX ADMIN — PANTOPRAZOLE SODIUM 80 MILLIGRAM(S): 20 TABLET, DELAYED RELEASE ORAL at 05:46

## 2020-04-17 RX ADMIN — Medication 650 MILLIGRAM(S): at 05:44

## 2020-04-17 RX ADMIN — Medication 90 MILLILITER(S): at 16:27

## 2020-04-17 RX ADMIN — Medication 50 MICROGRAM(S): at 05:45

## 2020-04-17 RX ADMIN — Medication 1300 MILLIGRAM(S): at 22:27

## 2020-04-17 RX ADMIN — AMLODIPINE BESYLATE 10 MILLIGRAM(S): 2.5 TABLET ORAL at 05:45

## 2020-04-17 RX ADMIN — Medication 50 MILLIGRAM(S): at 22:26

## 2020-04-17 RX ADMIN — Medication 1300 MILLIGRAM(S): at 13:40

## 2020-04-17 NOTE — PROGRESS NOTE ADULT - SUBJECTIVE AND OBJECTIVE BOX
Patient discussed with: Dr. Montgomery     Primary Diagnosis: COVID-19 associated pneumonia    SUBJECTIVE:  Patient is feeling okay this morning but complains of dizziness. Admits he is hungry and wants to eat and drink more but understands he cannot with the NGT. Denies any CP, palpitations, SOB, wheezing, abd pain, n/v/d/c, fevers or chills.     OVERNIGHT EVENTS: no acute events     OBJECTIVE:  Vitals: Vital Signs Last 24 Hrs  T(C): 36.4 (17 Apr 2020 13:35), Max: 36.7 (17 Apr 2020 06:09)  T(F): 97.6 (17 Apr 2020 13:35), Max: 98.1 (17 Apr 2020 06:09)  HR: 90 (17 Apr 2020 13:35) (74 - 90)  BP: 151/65 (17 Apr 2020 13:35) (125/58 - 151/65)  BP(mean): --  RR: 20 (17 Apr 2020 13:35) (19 - 20)  SpO2: 96% (17 Apr 2020 13:35) (96% - 99%)  I&O:  I&O's Detail    16 Apr 2020 07:01  -  17 Apr 2020 07:00  --------------------------------------------------------  IN:  Total IN: 0 mL    OUT:    Ileostomy: 300 mL    Nasoenteral Tube: 1000 mL    Voided: 450 mL  Total OUT: 1750 mL    Total NET: -1750 mL      17 Apr 2020 07:01  -  17 Apr 2020 14:33  --------------------------------------------------------  IN:  Total IN: 0 mL    OUT:    Voided: 500 mL  Total OUT: 500 mL    Total NET: -500 mL    PHYSICAL EXAM:   General: No acute distress.   HEENT: Normocephalic, atraumatic.  Exhibits appropriate hearing and visual acuity. No palpable neck masses or hoarseness.   Psych: Affect appropriate  Neuro: AAOx3, no AMS or focal deficits.   Pulm: no supplemental oxygen ; CTA b/l, no w/r/r.  Card: RRR, S1/S2, no m/r/g.   Vascular: Pulses 2+ in b/l R, DP, PT. No LE edema b/l.   GI: Wound vac in place on laparotomy incision. Colostomy bag in place.   : + condom catheter  Skin: No obvious rash, abrasion, lesions. Normal skin color and turgor.     LABS:                         7.9    14.98 )-----------( 465      ( 17 Apr 2020 13:46 )             24.1   04-17    128<L>  |  101  |  33<H>  ----------------------------<  59<L>  4.8   |  11<L>  |  6.43<H>    Ca    8.0<L>      17 Apr 2020 13:46  Phos  5.2     04-16  Mg     2.0     04-17    TPro  5.9<L>  /  Alb  2.3<L>  /  TBili  0.2  /  DBili  x   /  AST  20  /  ALT  16  /  AlkPhos  98  04-17  PT/INR - ( 17 Apr 2020 13:46 )   PT: 15.6 sec;   INR: 1.36          PTT - ( 17 Apr 2020 13:46 )  PTT:31.4 sec  Ferritin, Serum: 499 ng/mL (04-17-20 @ 13:46)  D-Dimer Assay, Quantitative: 2863 ng/mL DDU (04-17-20 @ 13:46)  D-Dimer Assay, Quantitative: 3186 ng/mL DDU (04-17-20 @ 12:55)  CAPILLARY BLOOD GLUCOSE      POCT Blood Glucose.: 98 mg/dL (17 Apr 2020 13:53)    MEDICATIONS  (STANDING):  albumin human 25% IVPB 360 milliLiter(s) IV Intermittent once  amLODIPine   Tablet 10 milliGRAM(s) Oral daily  atorvastatin 10 milliGRAM(s) Oral at bedtime  gabapentin 100 milliGRAM(s) Oral at bedtime  insulin glargine Injectable (LANTUS) 6 Unit(s) SubCutaneous at bedtime  insulin lispro (HumaLOG) corrective regimen sliding scale   SubCutaneous Before meals and at bedtime  insulin lispro Injectable (HumaLOG) 5 Unit(s) SubCutaneous three times a day before meals  levothyroxine 50 MICROGram(s) Oral daily  pantoprazole  Injectable 80 milliGRAM(s) IV Push every 12 hours  sodium bicarbonate 1300 milliGRAM(s) Oral three times a day  traZODone 50 milliGRAM(s) Oral at bedtime    MEDICATIONS  (PRN):  acetaminophen   Tablet .. 650 milliGRAM(s) Oral every 6 hours PRN Mild Pain (1 - 3)  dextrose 40% Gel 15 Gram(s) Oral once PRN Blood Glucose LESS THAN 70 milliGRAM(s)/deciliter  glucagon  Injectable 1 milliGRAM(s) IntraMuscular once PRN Glucose LESS THAN 70 milligrams/deciliter      RADIOLOGY/OTHER STUDIES:  < from: Xray Chest 1 View- PORTABLE-Urgent (04.17.20 @ 07:39) >  IMPRESSION:  NG tube noted with distal aspect obscured in abdomen. Recommend x-ray abdomen.  < end of copied text >

## 2020-04-17 NOTE — PROGRESS NOTE ADULT - ASSESSMENT
59 y/o M with PMH of HTN, DM complicated by neuropathy, EtOH liver cirrhosis, presenting w/ pneumatosis of unknown etiology, s/p sub total colectomy and end ileostomy on 3/31, c/b fever and purulent wound infection (4/9) now s/p paracentesis 4/13    Neuro: Pain/Nausea control  CV: HDS but hypertensive, lisinopril 40 mg and amlodipine 10mg, Labetalol 20 mg PRN BP >170, hydralazine 10 mg prn  Pulm: CED RAMIREZ: CLD, Ileostomy pink, ostomy functioning, received ostomy teaching.  : Voids, urinating in bed- inacurrate I/Os  ID: Zosyn (4/9-), vanc (4/9-4/13), azithro 4/13-, plaquenil 4/13  Endo: ISS, lantus 8, lispro 7 tid synthroid  PPx: SCDs, SQH, OOBA  PT/OT: initial evaluation 4/3  Wounds: Vac in place

## 2020-04-17 NOTE — PROGRESS NOTE ADULT - ASSESSMENT
MEG   hyoponatremia  metabolic acidosis  COVID pneumonia   clinically appears volume depleted - CXR with infiltrates likely due to COVID but comfortable on RA w/o evidence overload   suggest IVF with hco3-- d5w with 150 meq hco3/ L at 60 cc/hour x 24 hours  follow clinically - may give more tomorrow  check ur lytes today  d/w team

## 2020-04-17 NOTE — PROGRESS NOTE ADULT - SUBJECTIVE AND OBJECTIVE BOX
INTERVAL HPI/OVERNIGHT EVENTS: NAEO. This AM pt is feeling well. Denies any n/v/cp/sob.     STATUS POST:  ex lap, subtotal colectomy, ileostomy creation    POST OPERATIVE DAY #: 17/4    MEDICATIONS  (STANDING):  amLODIPine   Tablet 10 milliGRAM(s) Oral daily  atorvastatin 10 milliGRAM(s) Oral at bedtime  dextrose 5%. 1000 milliLiter(s) (50 mL/Hr) IV Continuous <Continuous>  dextrose 50% Injectable 12.5 Gram(s) IV Push once  dextrose 50% Injectable 25 Gram(s) IV Push once  dextrose 50% Injectable 25 Gram(s) IV Push once  gabapentin 100 milliGRAM(s) Oral at bedtime  insulin glargine Injectable (LANTUS) 8 Unit(s) SubCutaneous at bedtime  insulin lispro (HumaLOG) corrective regimen sliding scale   SubCutaneous Before meals and at bedtime  insulin lispro Injectable (HumaLOG) 7 Unit(s) SubCutaneous three times a day before meals  levothyroxine 50 MICROGram(s) Oral daily  pantoprazole  Injectable 80 milliGRAM(s) IV Push every 12 hours  sodium bicarbonate 1300 milliGRAM(s) Oral three times a day  traZODone 50 milliGRAM(s) Oral at bedtime    MEDICATIONS  (PRN):  acetaminophen   Tablet .. 650 milliGRAM(s) Oral every 6 hours PRN Mild Pain (1 - 3)  dextrose 40% Gel 15 Gram(s) Oral once PRN Blood Glucose LESS THAN 70 milliGRAM(s)/deciliter  glucagon  Injectable 1 milliGRAM(s) IntraMuscular once PRN Glucose LESS THAN 70 milligrams/deciliter      Vital Signs Last 24 Hrs  T(C): 36.7 (17 Apr 2020 06:09), Max: 36.7 (17 Apr 2020 06:09)  T(F): 98.1 (17 Apr 2020 06:09), Max: 98.1 (17 Apr 2020 06:09)  HR: 74 (17 Apr 2020 06:09) (74 - 80)  BP: 142/72 (17 Apr 2020 06:09) (142/72 - 150/69)  BP(mean): --  RR: 20 (17 Apr 2020 06:09) (19 - 20)  SpO2: 98% (17 Apr 2020 06:09) (97% - 99%)    PHYSICAL EXAM:      Constitutional: A&Ox3    Respiratory: non labored breathing, no respiratory distress    Cardiovascular: NSR, RRR    Gastrointestinal: abdomen is soft, moderately distended, tender to palpation near midline wound                 Incision: Vac in place with good seal. Dark output from ostomy, which is flush with the skin. NGT clogged, flushed at bedside with immediate return of 500cc of output    Extremities: (-) edema                  I&O's Detail    16 Apr 2020 07:01  -  17 Apr 2020 07:00  --------------------------------------------------------  IN:  Total IN: 0 mL    OUT:    Ileostomy: 300 mL    Nasoenteral Tube: 1000 mL    Voided: 450 mL  Total OUT: 1750 mL    Total NET: -1750 mL          LABS:                        9.0    15.00 )-----------( 430      ( 16 Apr 2020 16:20 )             27.0     04-16    125<L>  |  100  |  22  ----------------------------<  82  4.3   |  13<L>  |  5.12<H>    Ca    7.5<L>      16 Apr 2020 06:18  Phos  5.2     04-16  Mg     1.8     04-16    TPro  6.2  /  Alb  2.3<L>  /  TBili  0.2  /  DBili  x   /  AST  27  /  ALT  22  /  AlkPhos  116  04-16      Urinalysis Basic - ( 15 Apr 2020 12:28 )    Color: Yellow / Appearance: Clear / SG: <=1.005 / pH: x  Gluc: x / Ketone: NEGATIVE  / Bili: Negative / Urobili: 0.2 E.U./dL   Blood: x / Protein: 30 mg/dL / Nitrite: NEGATIVE   Leuk Esterase: NEGATIVE / RBC: < 5 /HPF / WBC < 5 /HPF   Sq Epi: x / Non Sq Epi: 0-5 /HPF / Bacteria: Present /HPF        RADIOLOGY & ADDITIONAL STUDIES:

## 2020-04-17 NOTE — PROGRESS NOTE ADULT - SUBJECTIVE AND OBJECTIVE BOX
INTERVAL HPI/OVERNIGHT EVENTS:    Patient is a 60y old  Male who presents with a chief complaint of no BM, megacolon (17 Apr 2020 14:27)  no acute overnight events  pt NPO  insulin administration reviewed  dextrose administration reviewed  ongoing concern for ileus, abdominal distension  saturating well on room air.       MEDICATIONS  (STANDING):  amLODIPine   Tablet 10 milliGRAM(s) Oral daily  atorvastatin 10 milliGRAM(s) Oral at bedtime  dextrose 5%. 1000 milliLiter(s) (50 mL/Hr) IV Continuous <Continuous>  dextrose 50% Injectable 12.5 Gram(s) IV Push once  dextrose 50% Injectable 25 Gram(s) IV Push once  dextrose 50% Injectable 25 Gram(s) IV Push once  gabapentin 100 milliGRAM(s) Oral at bedtime  insulin glargine Injectable (LANTUS) 6 Unit(s) SubCutaneous at bedtime  insulin lispro (HumaLOG) corrective regimen sliding scale   SubCutaneous Before meals and at bedtime  insulin lispro Injectable (HumaLOG) 6 Unit(s) SubCutaneous every 6 hours  levothyroxine 50 MICROGram(s) Oral daily  pantoprazole  Injectable 80 milliGRAM(s) IV Push every 12 hours  sodium bicarbonate 1300 milliGRAM(s) Oral three times a day  traZODone 50 milliGRAM(s) Oral at bedtime    MEDICATIONS  (PRN):  acetaminophen   Tablet .. 650 milliGRAM(s) Oral every 6 hours PRN Mild Pain (1 - 3)  dextrose 40% Gel 15 Gram(s) Oral once PRN Blood Glucose LESS THAN 70 milliGRAM(s)/deciliter  glucagon  Injectable 1 milliGRAM(s) IntraMuscular once PRN Glucose LESS THAN 70 milligrams/deciliter      Past medical history reviewed  Family history reviewed  Social history reviewed    PHYSICAL EXAM  Vital Signs Last 24 Hrs  T(C): 36.4 (17 Apr 2020 13:35), Max: 36.7 (17 Apr 2020 06:09)  T(F): 97.6 (17 Apr 2020 13:35), Max: 98.1 (17 Apr 2020 06:09)  HR: 90 (17 Apr 2020 13:35) (74 - 90)  BP: 151/65 (17 Apr 2020 13:35) (125/58 - 151/65)  BP(mean): --  RR: 20 (17 Apr 2020 13:35) (19 - 20)  SpO2: 96% (17 Apr 2020 13:35) (96% - 99%)    Due to the nature of this patient’s COVID-19 isolation status (either confirmed or suspected), this note was prepared without a bedside physical examination to prevent spread of infection and to conserve personal protective equipment during this nationwide pandemic. If possible, direct patient communication occurred via electronic measures or telephone conversation. Examination highlights were provided by a bedside nurse wearing personal protective equipment and review of pertinent medical records. Objective data (vital signs, urine output, lab results, imaging studies, medications, etc) were reviewed in detail. Face to face visits and physical examination have been limited only to patients for whom it is required for medical decision making.    LABS:                        7.9    14.98 )-----------( 465      ( 17 Apr 2020 13:46 )             24.1     04-17    128<L>  |  101  |  33<H>  ----------------------------<  59<L>  4.8   |  11<L>  |  6.43<H>    Ca    8.0<L>      17 Apr 2020 13:46  Phos  5.2     04-16  Mg     2.0     04-17    TPro  5.9<L>  /  Alb  2.3<L>  /  TBili  0.2  /  DBili  x   /  AST  20  /  ALT  16  /  AlkPhos  98  04-17    PT/INR - ( 17 Apr 2020 13:46 )   PT: 15.6 sec;   INR: 1.36          PTT - ( 17 Apr 2020 13:46 )  PTT:31.4 sec    Thyroid Stimulating Hormone, Serum: 2.813 uIU/mL (04-14 @ 07:28)  Thyroid Stimulating Hormone, Serum: 2.877 uIU/mL (04-14 @ 00:34)      HbA1C: 6.9 % (04-05 @ 06:59)  7.6 % (03-31 @ 05:58)    CAPILLARY BLOOD GLUCOSE      POCT Blood Glucose.: 96 mg/dL (17 Apr 2020 17:11)  POCT Blood Glucose.: 98 mg/dL (17 Apr 2020 13:53)  POCT Blood Glucose.: 64 mg/dL (17 Apr 2020 12:56)  POCT Blood Glucose.: 59 mg/dL (17 Apr 2020 11:51)  POCT Blood Glucose.: 72 mg/dL (17 Apr 2020 06:38)  POCT Blood Glucose.: 82 mg/dL (17 Apr 2020 00:19)  POCT Blood Glucose.: 92 mg/dL (16 Apr 2020 21:49)

## 2020-04-17 NOTE — PROGRESS NOTE ADULT - ASSESSMENT
Hospital Course:  This is a 59 y/o M with PMH of HTN, DM complicated by neuropathy, ETOH liver cirrhosis, who presents to [facility] for fall at home. At [facility] patient reported that he takes lactulose at home daily and has daily BM, but he had no BM for 5 days prior to admission. 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 Patient underwent ex lap and subtotal colectomy, and ileostomy on 3/31. Pt had hyperglycemia at this time and started on insulin gtt briefly. He initially tested negative for COVID 19, but subsequent test was positive, patient was transferred to tele COVID unit without need for supplemental oxygen.     Pt had issues with hypertension during admission and remains on amlodipine briefly requiring hydralazine as well. Consulted GI to r/o hepatorenal syndrome and recommending albumin trial which pt is undergoing presently. On 4/15 renal consulted to w/u MEG and recommending no IVF due to low sodium. Concerned for metabolic acidosis and started on sodium bicarb TID. General surgery following patient and managing wound vac. On 4/16 CT abd/pelvis ordered per surgery but was not completed 2/2 to low H/H. Pt transfused x1 unit pRBCs and AC was held 2/2 to bleeding. On 4/17 H/H drop but not requiring transfusion and should be trended daily.    Plan:  1. Neurovascular: No delirium  -Stable  -c/w Gabapentin   -c/w Trazadone     2. Respiratory:   - (+) COVID  -No supplemental O2 required, 98% on RA  -COVID pneumonia:     -s/p x4 doses of HCQ and x3 doses of Azithro- stopped originally 2/2 to ?MEG  -CXR reviewed    3. Cardiovascular:   -HTN: amlodipine 10mg daily  -HLD: atorvastatin 10mg daily    -Difficulty IV access, IV placed on right foot.     4. GI: Tolerating PO  -Diet: NPO  -Prophylaxis: Pepcid  -GI following - r/o hepatorenal syndrome. Undergoing albumin trial (350mL over 4 hours of Albumin 25% x1 dose today, repeat x1 dose tomorrow). F/u with GI.     5. /Renal:   -MEG this admission: worsening, renal following -- continue with albumin trial and monitor closely.   -BUN/Cr: 33/6.43  -Trend Cr on AM labs  -Monitor UOP  -Replete electrolytes as needed for goal K+ 4.0, Phos 3.0, Mg 2.0    6. ID:   -WBC: 14.9  -Continue with regular surveillance labs including CBC with diff, CMP, Mg, Phos, CRP, ABG, Ferritin, CK, Triglycerides, Procalcitonin, D-Dimer, EKG  -Additional labs q3d: ESR, T-cell subset, LDH, Ferritin, CK, Troponin, Coags  -COVID isolation protocol     7. Endo:  -A1C: 6.9  -TSH: 2.8    8. Heme:   -H/H: 7.9/24 (trend daily, transfuse for hgb <7)  -Continue to monitor on daily and q3d labs as above.  -DVT ppx: holding AC 2/2 to bleeding, trend H/H tomorrow and restart AC if pt remains stable    Disposition: Full Code. Hospital Course:  This is a 59 y/o M with PMH of HTN, DM complicated by neuropathy, ETOH liver cirrhosis, who presents to [facility] for fall at home. At [facility] patient reported that he takes lactulose at home daily and has daily BM, but he had no BM for 5 days prior to admission. 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 Patient underwent ex lap and subtotal colectomy, and ileostomy on 3/31. Pt had hyperglycemia at this time and started on insulin gtt briefly. He initially tested negative for COVID 19, but subsequent test was positive, patient was transferred to tele COVID unit without need for supplemental oxygen.     Pt had issues with hypertension during admission and remains on amlodipine briefly requiring hydralazine as well. Consulted GI to r/o hepatorenal syndrome and recommending albumin trial which pt is undergoing presently. On 4/15 renal consulted to w/u MEG and recommending no IVF due to low sodium. Concerned for metabolic acidosis and started on sodium bicarb TID. General surgery following patient and managing wound vac. On 4/16 CT abd/pelvis ordered per surgery but was not completed 2/2 to low H/H. Pt transfused x1 unit pRBCs and AC was held 2/2 to bleeding. On 4/17 H/H drop but not requiring transfusion and should be trended daily.    Plan:  1. Neurovascular: No delirium  -Stable  -c/w Gabapentin   -c/w Trazadone     2. Respiratory:   - (+) COVID  -No supplemental O2 required, 98% on RA  -COVID pneumonia:     -s/p x4 doses of HCQ and x3 doses of Azithro- stopped originally 2/2 to ?MEG  -CXR reviewed    3. Cardiovascular:   -HTN: amlodipine 10mg daily  -HLD: atorvastatin 10mg daily    -Difficulty IV access, IV placed on right foot.     4. GI: Tolerating PO  -Diet: NPO  -Prophylaxis: Pepcid  -GI following - r/o hepatorenal syndrome. Undergoing albumin trial (350mL over 4 hours of Albumin 25% x1 dose today, repeat x1 dose tomorrow). F/u with GI.     5. /Renal:   -MEG this admission: worsening, renal following -- continue with albumin trial and monitor closely.   -BUN/Cr: 33/6.43  -Trend Cr on AM labs  -Monitor UOP  -Replete electrolytes as needed for goal K+ 4.0, Phos 3.0, Mg 2.0    6. ID:   -WBC: 14.9  -Continue with regular surveillance labs including CBC with diff, CMP, Mg, Phos, CRP, ABG, Ferritin, CK, Triglycerides, Procalcitonin, D-Dimer, EKG  -Additional labs q3d: ESR, T-cell subset, LDH, Ferritin, CK, Troponin, Coags  -COVID isolation protocol     7. Endo:  -A1C: 6.9  -DM: c/w with Lantus 8, Lispro 7 -- (decreased both today 2/2 to hypoglycemia in throughout the day)   -TSH: 2.8  -Hypothyroid: c/w levothyroxine 50mcg     8. Heme:   -H/H: 7.9/24 (trend daily, transfuse for hgb <7)  -Continue to monitor on daily and q3d labs as above.  -DVT ppx: holding AC 2/2 to bleeding, trend H/H tomorrow and restart AC if pt remains stable    Disposition: Full Code. Hospital Course:  This is a 59 y/o M with PMH of HTN, DM complicated by neuropathy, ETOH liver cirrhosis, who presents to [facility] for fall at home. At [facility] patient reported that he takes lactulose at home daily and has daily BM, but he had no BM for 5 days prior to admission. 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 Patient underwent ex lap and subtotal colectomy, and ileostomy on 3/31. Pt had hyperglycemia at this time and started on insulin gtt briefly. He initially tested negative for COVID 19, but subsequent test was positive, patient was transferred to tele COVID unit without need for supplemental oxygen.     Pt had issues with hypertension during admission and remains on amlodipine briefly requiring hydralazine as well. Consulted GI to r/o hepatorenal syndrome and recommending albumin trial which pt is undergoing presently. On 4/15 renal consulted to w/u MEG and recommending no IVF due to low sodium. Concerned for metabolic acidosis and started on sodium bicarb TID. General surgery following patient and managing wound vac. On 4/16 CT abd/pelvis ordered per surgery but was not completed 2/2 to low H/H. Pt transfused x1 unit pRBCs and AC was held 2/2 to bleeding. On 4/17 H/H drop but not requiring transfusion and should be trended daily.    Plan:  1. Neurovascular: No delirium  -Stable  -c/w Gabapentin   -c/w Trazadone     2. Respiratory:   - (+) COVID  -No supplemental O2 required, 98% on RA  -COVID pneumonia:     -s/p x4 doses of HCQ and x3 doses of Azithro- stopped originally 2/2 to ?MEG  -CXR reviewed    3. Cardiovascular:   -HTN: amlodipine 10mg daily  -HLD: atorvastatin 10mg daily    -Difficulty IV access, IV placed on right foot.     4. GI:   -S/p laparotomy: wound vac in place (managed by general surgery)  -Ileostomy in place, functioning well   -Diet: NPO  -Prophylaxis: Pepcid  -GI following - r/o hepatorenal syndrome. Undergoing albumin trial (350mL over 4 hours of Albumin 25% x1 dose today, repeat x1 dose tomorrow). F/u with GI.     5. /Renal:   -MEG this admission: worsening, renal following -- continue with albumin trial and monitor closely.   -BUN/Cr: 33/6.43  -Trend Cr on AM labs  -Monitor UOP  -Replete electrolytes as needed for goal K+ 4.0, Phos 3.0, Mg 2.0  +Condom catheter     6. ID:   -WBC: 14.9  -Continue with regular surveillance labs including CBC with diff, CMP, Mg, Phos, CRP, ABG, Ferritin, CK, Triglycerides, Procalcitonin, D-Dimer, EKG  -Additional labs q3d: ESR, T-cell subset, LDH, Ferritin, CK, Troponin, Coags  -COVID isolation protocol     7. Endo:  -A1C: 6.9  -DM: c/w with Lantus 8, Lispro 7 -- (decreased both today 2/2 to hypoglycemia in throughout the day)   -TSH: 2.8  -Hypothyroid: c/w levothyroxine 50mcg     8. Heme:   -H/H: 7.9/24 (trend daily, transfuse for hgb <7)  -Continue to monitor on daily and q3d labs as above.  -DVT ppx: holding AC 2/2 to bleeding, trend H/H tomorrow and restart AC if pt remains stable    Disposition: Full Code.

## 2020-04-17 NOTE — PROGRESS NOTE ADULT - SUBJECTIVE AND OBJECTIVE BOX
Patient seen and examined at bedside.   c.o weak and dizzy when gets up   not eating - has NGT    acetaminophen   Tablet .. 650 milliGRAM(s) every 6 hours PRN  amLODIPine   Tablet 10 milliGRAM(s) daily  atorvastatin 10 milliGRAM(s) at bedtime  dextrose 40% Gel 15 Gram(s) once PRN  dextrose 5% 1000 milliLiter(s) <Continuous>  dextrose 5%. 1000 milliLiter(s) <Continuous>  dextrose 50% Injectable 12.5 Gram(s) once  dextrose 50% Injectable 25 Gram(s) once  dextrose 50% Injectable 25 Gram(s) once  gabapentin 100 milliGRAM(s) at bedtime  glucagon  Injectable 1 milliGRAM(s) once PRN  insulin glargine Injectable (LANTUS) 6 Unit(s) at bedtime  insulin regular  human corrective regimen sliding scale   every 6 hours  levothyroxine 50 MICROGram(s) daily  pantoprazole  Injectable 80 milliGRAM(s) every 12 hours  sodium bicarbonate 1300 milliGRAM(s) three times a day  traZODone 50 milliGRAM(s) at bedtime      Allergies    No Known Allergies    Intolerances        T(C): , Max: 36.7 (04-17-20 @ 06:09)  T(F): , Max: 98.1 (04-17-20 @ 06:09)  HR: 90 (04-17-20 @ 18:00)  BP: 152/69 (04-17-20 @ 18:00)  BP(mean): --  RR: 20 (04-17-20 @ 18:00)  SpO2: 97% (04-17-20 @ 18:00)  Wt(kg): --    04-16 @ 07:01  -  04-17 @ 07:00  --------------------------------------------------------  IN:  Total IN: 0 mL    OUT:    Ileostomy: 300 mL    Nasoenteral Tube: 1000 mL    Voided: 450 mL  Total OUT: 1750 mL    Total NET: -1750 mL      04-17 @ 07:01  -  04-17 @ 20:06  --------------------------------------------------------  IN:    Albumin 25%: 350 mL    Enteral Tube Flush: 60 mL  Total IN: 410 mL    OUT:    Ileostomy: 150 mL    Nasoenteral Tube: 600 mL    Voided: 700 mL  Total OUT: 1450 mL    Total NET: -1040 mL              PHYSICAL EXAM: limited due to COVID  Constitutional: weak   No acute distress on RA, NGT  ENMT: Moist mucous membrane.  No cyanosis.  Neck:. No JVD.  Cardiovascular: Regular rate and rhythm.    Gastrointestinal: soft, mild tenderness  Extremities:  No lower extremity edema.    Neurological: No focal deficits.  Skin: Warm. Dry.    Psychiatric: Normal affect.    ACCESS:       LABS:                        7.9    14.98 )-----------( 465      ( 17 Apr 2020 13:46 )             24.1     04-17    128<L>  |  101  |  33<H>  ----------------------------<  59<L>  4.8   |  11<L>  |  6.43<H>    Ca    8.0<L>      17 Apr 2020 13:46  Phos  5.2     04-16  Mg     2.0     04-17    TPro  5.9<L>  /  Alb  2.3<L>  /  TBili  0.2  /  DBili  x   /  AST  20  /  ALT  16  /  AlkPhos  98  04-17      PT/INR - ( 17 Apr 2020 13:46 )   PT: 15.6 sec;   INR: 1.36          PTT - ( 17 Apr 2020 13:46 )  PTT:31.4 sec          RADIOLOGY & ADDITIONAL STUDIES:

## 2020-04-17 NOTE — ADVANCED PRACTICE NURSE CONSULT - ASSESSMENT
Per MD request, ileostomy irrigated, 240 cc's of NS used and approx 20 cc's returned during assessment/procedure. Pt with no c/o cramping, etc., red rubber catheter easily inserted into stoma with no resistance. 150 cc's dark brown liquid emptied from appliance prior to procedure. New 1 3/4" Angi 2 piece appliance placed after procedure, stoma remains flush to skin.

## 2020-04-17 NOTE — PROGRESS NOTE ADULT - ATTENDING COMMENTS
A/P 60yMale with hx of DM now with COVID infection, NPO with intermittent hypoglycemia    1.  DM: controlled  lantus 6 at bedtime, regular insulin ISF-25 every 6 hours while pt NPO  FSG Goal 100-180  will check AM c-peptide    2.  Hyperlipidemia - goal LDL < 70  continue statin    3.  Obesity - outpatient medical management.      4.  Hypothyroidism - controlled  continue levothyroxine 50mcg once daily in the morning    will follow     Pt is advised to follow up with me at discharge by calling .      Evelyne Underwood MD, PhD  Endocrinology  [address] [place] [address] [id number]  [place] [address]  [phone] Tel  [phone] Fax  [email or web address]

## 2020-04-17 NOTE — PROGRESS NOTE ADULT - SUBJECTIVE AND OBJECTIVE BOX
INTERVAL HPI/OVERNIGHT EVENTS:    Patient is a 60y old  Male who presents with a chief complaint of no BM, megacolon (17 Apr 2020 14:27)      Pt reports the following symptoms:    CONSTITUTIONAL:  Negative fever or chills, feels well, good appetite  EYES:  Negative  blurry vision or double vision  CARDIOVASCULAR:  Negative for chest pain or palpitations  RESPIRATORY:  Negative for cough, wheezing, or SOB   GASTROINTESTINAL:  Negative for nausea, vomiting, diarrhea, constipation, or abdominal pain  GENITOURINARY:  Negative frequency, urgency or dysuria  NEUROLOGIC:  No headache, confusion, dizziness, lightheadedness    MEDICATIONS  (STANDING):  amLODIPine   Tablet 10 milliGRAM(s) Oral daily  atorvastatin 10 milliGRAM(s) Oral at bedtime  dextrose 5%. 1000 milliLiter(s) (50 mL/Hr) IV Continuous <Continuous>  dextrose 50% Injectable 12.5 Gram(s) IV Push once  dextrose 50% Injectable 25 Gram(s) IV Push once  dextrose 50% Injectable 25 Gram(s) IV Push once  gabapentin 100 milliGRAM(s) Oral at bedtime  insulin glargine Injectable (LANTUS) 6 Unit(s) SubCutaneous at bedtime  insulin lispro Injectable (HumaLOG) 6 Unit(s) SubCutaneous every 6 hours  insulin regular  human corrective regimen sliding scale   SubCutaneous every 6 hours  levothyroxine 50 MICROGram(s) Oral daily  pantoprazole  Injectable 80 milliGRAM(s) IV Push every 12 hours  sodium bicarbonate 1300 milliGRAM(s) Oral three times a day  traZODone 50 milliGRAM(s) Oral at bedtime    MEDICATIONS  (PRN):  acetaminophen   Tablet .. 650 milliGRAM(s) Oral every 6 hours PRN Mild Pain (1 - 3)  dextrose 40% Gel 15 Gram(s) Oral once PRN Blood Glucose LESS THAN 70 milliGRAM(s)/deciliter  glucagon  Injectable 1 milliGRAM(s) IntraMuscular once PRN Glucose LESS THAN 70 milligrams/deciliter      PHYSICAL EXAM  Vital Signs Last 24 Hrs  T(C): 36.4 (17 Apr 2020 18:00), Max: 36.7 (17 Apr 2020 06:09)  T(F): 97.5 (17 Apr 2020 18:00), Max: 98.1 (17 Apr 2020 06:09)  HR: 90 (17 Apr 2020 18:00) (74 - 90)  BP: 152/69 (17 Apr 2020 18:00) (125/58 - 152/69)  BP(mean): --  RR: 20 (17 Apr 2020 18:00) (19 - 20)  SpO2: 97% (17 Apr 2020 18:00) (96% - 99%)    Constitutional: wn/wd in NAD.   HEENT: NCAT, MMM, OP clear, EOMI, no proptosis or lid retraction  Neck: no thyromegaly or palpable thyroid nodules   Respiratory: lungs CTAB.  Cardiovascular: regular rhythm, normal S1 and S2, no audible murmurs, no peripheral edema  GI: soft, NT/ND, no masses/HSM appreciated.  Neurology: no tremors, DTR 2+  Skin: no visible rashes/lesions  Psychiatric: AAO x 3, normal affect/mood.    LABS:                        7.9    14.98 )-----------( 465      ( 17 Apr 2020 13:46 )             24.1     04-17    128<L>  |  101  |  33<H>  ----------------------------<  59<L>  4.8   |  11<L>  |  6.43<H>    Ca    8.0<L>      17 Apr 2020 13:46  Phos  5.2     04-16  Mg     2.0     04-17    TPro  5.9<L>  /  Alb  2.3<L>  /  TBili  0.2  /  DBili  x   /  AST  20  /  ALT  16  /  AlkPhos  98  04-17    PT/INR - ( 17 Apr 2020 13:46 )   PT: 15.6 sec;   INR: 1.36          PTT - ( 17 Apr 2020 13:46 )  PTT:31.4 sec    Thyroid Stimulating Hormone, Serum: 2.813 uIU/mL (04-14 @ 07:28)  Thyroid Stimulating Hormone, Serum: 2.877 uIU/mL (04-14 @ 00:34)      HbA1C: 6.9 % (04-05 @ 06:59)  7.6 % (03-31 @ 05:58)    CAPILLARY BLOOD GLUCOSE      POCT Blood Glucose.: 96 mg/dL (17 Apr 2020 17:11)  POCT Blood Glucose.: 98 mg/dL (17 Apr 2020 13:53)  POCT Blood Glucose.: 64 mg/dL (17 Apr 2020 12:56)  POCT Blood Glucose.: 59 mg/dL (17 Apr 2020 11:51)  POCT Blood Glucose.: 72 mg/dL (17 Apr 2020 06:38)  POCT Blood Glucose.: 82 mg/dL (17 Apr 2020 00:19)  POCT Blood Glucose.: 92 mg/dL (16 Apr 2020 21:49)      Insulin Sliding Scale requirements X 24 Hours:    RADIOLOGY & ADDITIONAL TESTS:    A/P: 60y Male with history of DM type II presenting for       1.  DM -     Please continue           units lantus at bedtime  / in the morning and        units lispro with meals and lispro moderate / low dose sliding scale 4 times daily with meals and at bedtime.  Please continue consistent carbohydrate diet.      Goal FSG is   Will continue to monitor   For discharge, pt can continue    Pt can follow up at discharge with [facility] by calling  to make an appointment.   Will discuss case with     and update primary team

## 2020-04-17 NOTE — PROGRESS NOTE ADULT - ASSESSMENT
61 y/o M with PMH of HTN, DM complicated by neuropathy, EtOH liver cirrhosis, presenting w/ pneumatosis of unknown etiology, s/p sub total colectomy and end ileostomy on 3/31, c/b fever and purulent wound infection (4/9), ascites now s/p paracentesis 4/13. Course further complicated by worsening MEG,  anemia, and COVID+     ETOH cirrhosis- MELD NA 25  Ascites: s/p para on 4/10, 140 cc sent. Cytology negative, but not sent for cell count/culture. Patient has been on zosyn for 2 weeks so doubt SBP  HE: on lactulose at home, currently oriented. Recommend continuing with lactulose, goal 2 to 3 BMs a day  Varices: unkown. H/h dwontrending but no signs of active bleeding at this time. Recommend EGD as outpatient to evaluate  HCC: no lession seen on CT, recommend screening q6 months  MEG: Creatine continues to rise. - renal us shows no hydro, also possibly pre -renal vs atn from vanc, contrast. Hepatorenal is a diagnosis of exlusion- recommend Albumin challenge 1g/kg IV x2 days  Anemia: Hemoglobin responded to transfusion with no signs of active bleeding. Recommend EGD, cscope as outpatient.    Case discussed with Dr. Hernandez and Dr. Rosa

## 2020-04-18 LAB
ALBUMIN SERPL ELPH-MCNC: 3.2 G/DL — LOW (ref 3.3–5)
ALP SERPL-CCNC: 92 U/L — SIGNIFICANT CHANGE UP (ref 40–120)
ALT FLD-CCNC: 12 U/L — SIGNIFICANT CHANGE UP (ref 10–45)
ANION GAP SERPL CALC-SCNC: 15 MMOL/L — SIGNIFICANT CHANGE UP (ref 5–17)
AST SERPL-CCNC: 14 U/L — SIGNIFICANT CHANGE UP (ref 10–40)
BASOPHILS # BLD AUTO: 0.03 K/UL — SIGNIFICANT CHANGE UP (ref 0–0.2)
BASOPHILS NFR BLD AUTO: 0.2 % — SIGNIFICANT CHANGE UP (ref 0–2)
BILIRUB SERPL-MCNC: 0.2 MG/DL — SIGNIFICANT CHANGE UP (ref 0.2–1.2)
BUN SERPL-MCNC: 29 MG/DL — HIGH (ref 7–23)
C PEPTIDE SERPL-MCNC: 2.1 NG/ML — SIGNIFICANT CHANGE UP (ref 1.1–4.4)
CALCIUM SERPL-MCNC: 8.3 MG/DL — LOW (ref 8.4–10.5)
CHLORIDE SERPL-SCNC: 103 MMOL/L — SIGNIFICANT CHANGE UP (ref 96–108)
CHLORIDE UR-SCNC: <20 MMOL/L — SIGNIFICANT CHANGE UP
CO2 SERPL-SCNC: 14 MMOL/L — LOW (ref 22–31)
CREAT ?TM UR-MCNC: 61 MG/DL — SIGNIFICANT CHANGE UP
CREAT SERPL-MCNC: 6.78 MG/DL — HIGH (ref 0.5–1.3)
CRP SERPL-MCNC: 24.14 MG/DL — HIGH (ref 0–0.4)
D DIMER BLD IA.RAPID-MCNC: 3149 NG/ML DDU — HIGH
EOSINOPHIL # BLD AUTO: 0.06 K/UL — SIGNIFICANT CHANGE UP (ref 0–0.5)
EOSINOPHIL NFR BLD AUTO: 0.4 % — SIGNIFICANT CHANGE UP (ref 0–6)
FERRITIN SERPL-MCNC: 475 NG/ML — HIGH (ref 30–400)
GLUCOSE BLDC GLUCOMTR-MCNC: 116 MG/DL — HIGH (ref 70–99)
GLUCOSE BLDC GLUCOMTR-MCNC: 143 MG/DL — HIGH (ref 70–99)
GLUCOSE BLDC GLUCOMTR-MCNC: 153 MG/DL — HIGH (ref 70–99)
GLUCOSE SERPL-MCNC: 130 MG/DL — HIGH (ref 70–99)
HCT VFR BLD CALC: 22 % — LOW (ref 39–50)
HGB BLD-MCNC: 7.5 G/DL — LOW (ref 13–17)
IMM GRANULOCYTES NFR BLD AUTO: 2.7 % — HIGH (ref 0–1.5)
LYMPHOCYTES # BLD AUTO: 0.55 K/UL — LOW (ref 1–3.3)
LYMPHOCYTES # BLD AUTO: 3.8 % — LOW (ref 13–44)
MAGNESIUM SERPL-MCNC: 2.2 MG/DL — SIGNIFICANT CHANGE UP (ref 1.6–2.6)
MCHC RBC-ENTMCNC: 28.7 PG — SIGNIFICANT CHANGE UP (ref 27–34)
MCHC RBC-ENTMCNC: 34.1 GM/DL — SIGNIFICANT CHANGE UP (ref 32–36)
MCV RBC AUTO: 84.3 FL — SIGNIFICANT CHANGE UP (ref 80–100)
MONOCYTES # BLD AUTO: 1.05 K/UL — HIGH (ref 0–0.9)
MONOCYTES NFR BLD AUTO: 7.2 % — SIGNIFICANT CHANGE UP (ref 2–14)
NEUTROPHILS # BLD AUTO: 12.53 K/UL — HIGH (ref 1.8–7.4)
NEUTROPHILS NFR BLD AUTO: 85.7 % — HIGH (ref 43–77)
NRBC # BLD: 0 /100 WBCS — SIGNIFICANT CHANGE UP (ref 0–0)
PHOSPHATE SERPL-MCNC: 7 MG/DL — HIGH (ref 2.5–4.5)
PLATELET # BLD AUTO: 447 K/UL — HIGH (ref 150–400)
POTASSIUM SERPL-MCNC: 4.7 MMOL/L — SIGNIFICANT CHANGE UP (ref 3.5–5.3)
POTASSIUM SERPL-SCNC: 4.7 MMOL/L — SIGNIFICANT CHANGE UP (ref 3.5–5.3)
PROT SERPL-MCNC: 6.5 G/DL — SIGNIFICANT CHANGE UP (ref 6–8.3)
RBC # BLD: 2.61 M/UL — LOW (ref 4.2–5.8)
RBC # FLD: 14.9 % — HIGH (ref 10.3–14.5)
SODIUM SERPL-SCNC: 132 MMOL/L — LOW (ref 135–145)
SODIUM UR-SCNC: 28 MMOL/L — SIGNIFICANT CHANGE UP
UUN UR-MCNC: 194 MG/DL — SIGNIFICANT CHANGE UP
WBC # BLD: 14.62 K/UL — HIGH (ref 3.8–10.5)
WBC # FLD AUTO: 14.62 K/UL — HIGH (ref 3.8–10.5)

## 2020-04-18 PROCEDURE — 99232 SBSQ HOSP IP/OBS MODERATE 35: CPT | Mod: GC

## 2020-04-18 PROCEDURE — 99233 SBSQ HOSP IP/OBS HIGH 50: CPT | Mod: GC

## 2020-04-18 PROCEDURE — 93970 EXTREMITY STUDY: CPT | Mod: 26

## 2020-04-18 PROCEDURE — 99232 SBSQ HOSP IP/OBS MODERATE 35: CPT

## 2020-04-18 PROCEDURE — 74018 RADEX ABDOMEN 1 VIEW: CPT | Mod: 26

## 2020-04-18 PROCEDURE — 71045 X-RAY EXAM CHEST 1 VIEW: CPT | Mod: 26,77

## 2020-04-18 PROCEDURE — 71045 X-RAY EXAM CHEST 1 VIEW: CPT | Mod: 26

## 2020-04-18 RX ORDER — POLYETHYLENE GLYCOL 3350 17 G/17G
17 POWDER, FOR SOLUTION ORAL
Refills: 0 | Status: DISCONTINUED | OUTPATIENT
Start: 2020-04-18 | End: 2020-04-18

## 2020-04-18 RX ORDER — HEPARIN SODIUM 5000 [USP'U]/ML
5000 INJECTION INTRAVENOUS; SUBCUTANEOUS EVERY 12 HOURS
Refills: 0 | Status: DISCONTINUED | OUTPATIENT
Start: 2020-04-18 | End: 2020-04-19

## 2020-04-18 RX ORDER — ISOPROPYL ALCOHOL, BENZOCAINE .7; .06 ML/ML; ML/ML
1 SWAB TOPICAL
Qty: 100 | Refills: 1
Start: 2020-04-18 | End: 2020-06-06

## 2020-04-18 RX ORDER — ACETAMINOPHEN 500 MG
1000 TABLET ORAL ONCE
Refills: 0 | Status: COMPLETED | OUTPATIENT
Start: 2020-04-18 | End: 2020-04-19

## 2020-04-18 RX ORDER — LACTULOSE 10 G/15ML
20 SOLUTION ORAL THREE TIMES A DAY
Refills: 0 | Status: DISCONTINUED | OUTPATIENT
Start: 2020-04-18 | End: 2020-04-19

## 2020-04-18 RX ORDER — ALBUMIN HUMAN 25 %
350 VIAL (ML) INTRAVENOUS ONCE
Refills: 0 | Status: COMPLETED | OUTPATIENT
Start: 2020-04-18 | End: 2020-04-18

## 2020-04-18 RX ORDER — SODIUM CHLORIDE 9 MG/ML
1000 INJECTION, SOLUTION INTRAVENOUS
Refills: 0 | Status: DISCONTINUED | OUTPATIENT
Start: 2020-04-18 | End: 2020-04-19

## 2020-04-18 RX ORDER — SENNA PLUS 8.6 MG/1
2 TABLET ORAL AT BEDTIME
Refills: 0 | Status: DISCONTINUED | OUTPATIENT
Start: 2020-04-18 | End: 2020-04-18

## 2020-04-18 RX ORDER — TRAZODONE HCL 50 MG
50 TABLET ORAL AT BEDTIME
Refills: 0 | Status: DISCONTINUED | OUTPATIENT
Start: 2020-04-18 | End: 2020-04-19

## 2020-04-18 RX ADMIN — INSULIN HUMAN 1: 100 INJECTION, SOLUTION SUBCUTANEOUS at 17:13

## 2020-04-18 RX ADMIN — LACTULOSE 20 GRAM(S): 10 SOLUTION ORAL at 17:18

## 2020-04-18 RX ADMIN — Medication 50 MICROGRAM(S): at 05:30

## 2020-04-18 RX ADMIN — Medication 1300 MILLIGRAM(S): at 16:23

## 2020-04-18 RX ADMIN — SODIUM CHLORIDE 60 MILLILITER(S): 9 INJECTION, SOLUTION INTRAVENOUS at 10:46

## 2020-04-18 RX ADMIN — AMLODIPINE BESYLATE 10 MILLIGRAM(S): 2.5 TABLET ORAL at 05:30

## 2020-04-18 RX ADMIN — Medication 87.5 MILLILITER(S): at 09:53

## 2020-04-18 RX ADMIN — HEPARIN SODIUM 5000 UNIT(S): 5000 INJECTION INTRAVENOUS; SUBCUTANEOUS at 17:19

## 2020-04-18 RX ADMIN — Medication 1300 MILLIGRAM(S): at 05:30

## 2020-04-18 RX ADMIN — PANTOPRAZOLE SODIUM 80 MILLIGRAM(S): 20 TABLET, DELAYED RELEASE ORAL at 05:30

## 2020-04-18 RX ADMIN — LACTULOSE 20 GRAM(S): 10 SOLUTION ORAL at 12:01

## 2020-04-18 RX ADMIN — PANTOPRAZOLE SODIUM 80 MILLIGRAM(S): 20 TABLET, DELAYED RELEASE ORAL at 17:18

## 2020-04-18 RX ADMIN — Medication 650 MILLIGRAM(S): at 06:39

## 2020-04-18 NOTE — PROGRESS NOTE ADULT - ASSESSMENT
MEG possibly ATN versus prerenal vs hepatorenal MEG possibly ATN versus prerenal vs hepatorenal  still awaiting urine lytes to help differentiate   Na and hco3 improved with hco3 drip   good uo   no sign uremia  suggest continue IVF -- d5w with 150 meq hco3/ L at 60 cc/hour - dane as still minimal intake   follow uo, chem   urine lytes

## 2020-04-18 NOTE — PROGRESS NOTE ADULT - ASSESSMENT
61 y/o M with PMH of HTN, DM complicated by neuropathy, EtOH liver cirrhosis, presenting w/ pneumatosis of unknown etiology, s/p sub total colectomy and end ileostomy on 3/31, c/b fever and purulent wound infection (4/9) now s/p paracentesis 4/13    Neuro: Pain/Nausea control  CV: HDS but hypertensive, lisinopril 40 mg and amlodipine 10mg, Labetalol 20 mg PRN BP >170, hydralazine 10 mg prn  Pulm: CED RAMIREZ: CLD, Ileostomy pink, ostomy functioning, received ostomy teaching.  : Voids, urinating in bed- inacurrate I/Os  ID: Zosyn (4/9-), vanc (4/9-4/13), azithro 4/13-, plaquenil 4/13  Endo: ISS, lantus 8, lispro 7 tid synthroid  PPx: SCDs, SQH, OOBA  PT/OT: initial evaluation 4/3  Wounds: Vac in place

## 2020-04-18 NOTE — PROGRESS NOTE ADULT - SUBJECTIVE AND OBJECTIVE BOX
Patient seen and examined at bedside.  received gentle IVF overnight  feels the same today- mult complaints, incl weak, dizziness, abd pain, poor staff attention to him       amLODIPine   Tablet 10 milliGRAM(s) daily  atorvastatin 10 milliGRAM(s) at bedtime  dextrose 40% Gel 15 Gram(s) once PRN  dextrose 5% 1000 milliLiter(s) <Continuous>  dextrose 5%. 1000 milliLiter(s) <Continuous>  dextrose 50% Injectable 12.5 Gram(s) once  dextrose 50% Injectable 25 Gram(s) once  dextrose 50% Injectable 25 Gram(s) once  gabapentin 100 milliGRAM(s) at bedtime  glucagon  Injectable 1 milliGRAM(s) once PRN  heparin   Injectable 5000 Unit(s) every 12 hours  insulin glargine Injectable (LANTUS) 6 Unit(s) at bedtime  insulin regular  human corrective regimen sliding scale   every 6 hours  lactulose Syrup 20 Gram(s) three times a day  levothyroxine 50 MICROGram(s) daily  pantoprazole  Injectable 80 milliGRAM(s) every 12 hours  sodium bicarbonate 1300 milliGRAM(s) three times a day  traZODone 50 milliGRAM(s) at bedtime PRN      Allergies    No Known Allergies    Intolerances        T(C): , Max: 37.3 (04-18-20 @ 08:08)  T(F): , Max: 99.1 (04-18-20 @ 08:08)  HR: 96 (04-18-20 @ 16:57)  BP: 153/65 (04-18-20 @ 16:57)  BP(mean): --  RR: 20 (04-18-20 @ 16:57)  SpO2: 94% (04-18-20 @ 16:57)  Wt(kg): --    04-17 @ 07:01  -  04-18 @ 07:00  --------------------------------------------------------  IN:    Albumin 25%: 350 mL    dextrose 5%: 540 mL    Enteral Tube Flush: 60 mL  Total IN: 950 mL    OUT:    Ileostomy: 450 mL    Nasoenteral Tube: 600 mL    Voided: 1150 mL  Total OUT: 2200 mL    Total NET: -1250 mL      04-18 @ 07:01  -  04-18 @ 20:01  --------------------------------------------------------  IN:    Albumin 25%: 350 mL    dextrose 5%: 120 mL    dextrose 5%: 1200 mL  Total IN: 1670 mL    OUT:    Ileostomy: 150 mL    Nasoenteral Tube: 600 mL    Voided: 250 mL  Total OUT: 1000 mL    Total NET: 670 mL              PHYSICAL EXAM: limited due to COVID  Constitutional:   No acute distress on RA. NGT  ENMT: Moist mucous membrane.  No cyanosis.  Neck:  No JVD.  Cardiovascular: Regular rate and rhythm.    Gastrointestinal: soft, mild tenderness. slt distended  Extremities: Warm.  No lower extremity edema.    Neurological: No focal deficits.  Skin: Warm. Dry.      ACCESS:       LABS:                        7.5    14.62 )-----------( 447      ( 18 Apr 2020 08:55 )             22.0     04-18    132<L>  |  103  |  29<H>  ----------------------------<  130<H>  4.7   |  14<L>  |  6.78<H>    Ca    8.3<L>      18 Apr 2020 08:55  Phos  7.0     04-18  Mg     2.2     04-18    TPro  6.5  /  Alb  3.2<L>  /  TBili  0.2  /  DBili  x   /  AST  14  /  ALT  12  /  AlkPhos  92  04-18      PT/INR - ( 17 Apr 2020 13:46 )   PT: 15.6 sec;   INR: 1.36          PTT - ( 17 Apr 2020 13:46 )  PTT:31.4 sec          RADIOLOGY & ADDITIONAL STUDIES:

## 2020-04-18 NOTE — PROGRESS NOTE ADULT - ASSESSMENT
Hospital Course:  This is a 61 y/o M with PMH of HTN, DM complicated by neuropathy, ETOH liver cirrhosis, who presents to [facility] for fall at home. At [facility] patient reported that he takes lactulose at home daily and has daily BM, but he had no BM for 5 days prior to admission. 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 Patient underwent ex lap and subtotal colectomy, and ileostomy on 3/31. Pt had hyperglycemia at this time and started on insulin gtt briefly. He initially tested negative for COVID 19, but subsequent test was positive, patient was transferred to tele COVID unit without need for supplemental oxygen.     Pt had issues with hypertension during admission and remains on amlodipine briefly requiring hydralazine as well. Consulted GI to r/o hepatorenal syndrome and recommending albumin trial which pt is undergoing presently. On 4/15 renal consulted to w/u MEG and recommending no IVF due to low sodium. Concerned for metabolic acidosis and started on sodium bicarb TID. General surgery following patient and managing wound vac. On 4/16 CT abd/pelvis ordered per surgery but was not completed 2/2 to low H/H. Pt transfused x1 unit pRBCs and AC was held 2/2 to bleeding. On 4/17 H/H drop but not requiring transfusion and should be trended daily.    Plan:  1. Neurovascular: No delirium  -Stable  -c/w Gabapentin   -c/w Trazadone     2. Respiratory:   - (+) COVID  -No supplemental O2 required, 98% on RA  -COVID pneumonia:     -s/p x4 doses of HCQ and x3 doses of Azithro- stopped originally 2/2 to ?MEG  -CXR reviewed    3. Cardiovascular:   -HTN: amlodipine 10mg daily  -HLD: atorvastatin 10mg daily    -Difficulty IV access, IV placed on right foot.     4. GI:   -S/p laparotomy: wound vac in place (managed by general surgery)  -Ileostomy in place, functioning well   -Diet: NPO  -Prophylaxis: Pepcid  -GI following - r/o hepatorenal syndrome. Undergoing albumin trial (350mL over 4 hours of Albumin 25% x1 dose today, repeat x1 dose tomorrow). F/u with GI.     5. /Renal:   -MEG this admission: worsening, renal following -- continue with albumin trial and monitor closely.   -BUN/Cr: 33/6.43  -Trend Cr on AM labs  -Monitor UOP  -Replete electrolytes as needed for goal K+ 4.0, Phos 3.0, Mg 2.0  +Condom catheter     6. ID:   -WBC: 14.9  -Continue with regular surveillance labs including CBC with diff, CMP, Mg, Phos, CRP, ABG, Ferritin, CK, Triglycerides, Procalcitonin, D-Dimer, EKG  -Additional labs q3d: ESR, T-cell subset, LDH, Ferritin, CK, Troponin, Coags  -COVID isolation protocol     7. Endo:  -A1C: 6.9  -DM: c/w with Lantus 8, Lispro 7 -- (decreased both today 2/2 to hypoglycemia in throughout the day)   -TSH: 2.8  -Hypothyroid: c/w levothyroxine 50mcg     8. Heme:   -H/H: 7.9/24 (trend daily, transfuse for hgb <7)  -Continue to monitor on daily and q3d labs as above.  -DVT ppx: holding AC 2/2 to bleeding, trend H/H tomorrow and restart AC if pt remains stable    Disposition: Full Code. Hospital Course:  This is a 61 y/o M with PMH of HTN, DM complicated by neuropathy, ETOH liver cirrhosis, who presents to [facility] for fall at home. At [facility] patient reported that he takes lactulose at home daily and has daily BM, but he had no BM for 5 days prior to admission. 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 Patient underwent ex lap and subtotal colectomy, and ileostomy on 3/31. Pt had hyperglycemia at this time and started on insulin gtt briefly. He initially tested negative for COVID 19, but subsequent test was positive, patient was transferred to tele COVID unit without need for supplemental oxygen.     Pt had issues with hypertension during admission and remains on amlodipine briefly requiring hydralazine as well. Consulted GI to r/o hepatorenal syndrome and recommending albumin trial which pt is undergoing presently. On 4/15 renal consulted to w/u MEG and recommending no IVF due to low sodium. Concerned for metabolic acidosis and started on sodium bicarb TID. General surgery following patient and managing wound vac. On 4/16 CT abd/pelvis ordered per surgery but was not completed 2/2 to low H/H. Pt transfused x1 unit pRBCs and AC was held 2/2 to bleeding. On 4/17 H/H drop but not requiring transfusion and should be trended daily. on 4/18 H/H remains stable. Lactulose started per GI (pt should have 2-3 BM per day). Continue to hold AC per surgery team due to concerns of bleeding.     Plan:  1. Neurovascular: No delirium  -neuropathy: c/w Gabapentin   -Depression/mood: c/w Trazadone at night    2. Respiratory:   - (+) COVID  -No supplemental O2 required, 98% on RA  -COVID pneumonia:     -s/p x4 doses of HCQ and x3 doses of Azithro- stopped originally 2/2 to ?MEG  -CXR reviewed    3. Cardiovascular:   -HTN: amlodipine 10mg daily  -HLD: atorvastatin 10mg daily    -Difficulty IV access, IV present on right foot     4. GI:   -S/p laparotomy: wound vac in place (managed by general surgery)  -Ileostomy in place, functioning well   -Bowel regiment: started on lactulose 3 times a day (pt should be having 2-3 BM per day)   -Diet: Full liquids   -Prophylaxis: Pepcid  -GI following - r/o hepatorenal syndrome. Undergoing albumin trial (350mL over 4 hours of Albumin 25% - pt received one dose yesterday and in the process of another dose today)    5. /Renal:   -MEG this admission: worsening, renal following -- continue with albumin trial and monitor closely.   -BUN/Cr: 33/6  -Trend Cr on AM labs  -Monitor UOP  -Replete electrolytes as needed for goal K+ 4.0, Phos 3.0, Mg 2.0  +Condom catheter     6. ID:   -WBC: 14.6  -Continue with regular surveillance labs including CBC with diff, CMP, Mg, Phos, CRP, ABG, Ferritin, CK, Triglycerides, Procalcitonin, D-Dimer, EKG  -Additional labs q3d: ESR, T-cell subset, LDH, Ferritin, CK, Troponin, Coags  -COVID isolation protocol     7. Endo:  -A1C: 6.9  -DM: c/w with Lantus 6 and RISS  -TSH: 2.8  -Hypothyroid: c/w levothyroxine 50mcg     8. Heme:   -H/H: 7.5/22 (trend daily, transfuse for hgb <7)  -Continue to monitor on daily and q3d labs as above.  -DVT ppx: holding AC 2/2 (per General surgery) due to concerns of bleeding, trend H/H closely/daily -- Duplex LE (-) for DVT this morning -- monitor D-dimer closely     Disposition: Full Code. Hospital Course:  This is a 59 y/o M with PMH of HTN, DM complicated by neuropathy, ETOH liver cirrhosis, who presents to [facility] for fall at home. At [facility] patient reported that he takes lactulose at home daily and has daily BM, but he had no BM for 5 days prior to admission. 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 Patient underwent ex lap and subtotal colectomy, and ileostomy on 3/31. Pt had hyperglycemia at this time and started on insulin gtt briefly. He initially tested negative for COVID 19, but subsequent test was positive, patient was transferred to tele COVID unit without need for supplemental oxygen.     Pt had issues with hypertension during admission and remains on amlodipine briefly requiring hydralazine as well. Consulted GI to r/o hepatorenal syndrome and recommending albumin trial which pt is undergoing presently. On 4/15 renal consulted to w/u MEG and recommending no IVF due to low sodium. Concerned for metabolic acidosis and started on sodium bicarb TID. General surgery following patient and managing wound vac. On 4/16 CT abd/pelvis ordered per surgery but was not completed 2/2 to low H/H. Pt transfused x1 unit pRBCs and AC was held 2/2 to bleeding. On 4/17 H/H drop but not requiring transfusion and should be trended daily. on 4/18 H/H remains stable. Lactulose started per GI (pt should have 2-3 BM per day). Continue to hold AC per surgery team due to concerns of bleeding.     Plan:  1. Neurovascular: No delirium  -neuropathy: c/w Gabapentin   -Depression/mood: c/w Trazadone at night    2. Respiratory:   - (+) COVID  -No supplemental O2 required, 98% on RA  -COVID pneumonia:     -s/p x4 doses of HCQ and x3 doses of Azithro- stopped originally 2/2 to ?MEG  -CXR reviewed    3. Cardiovascular:   -HTN: amlodipine 10mg daily  -HLD: atorvastatin 10mg daily    -Difficulty IV access, IV present on right foot     4. GI:   -S/p laparotomy: wound vac in place (managed by general surgery)  -Ileostomy in place, functioning well   -Bowel regiment: started on lactulose 3 times a day (pt should be having 2-3 BM per day)   -Diet: Full liquids   -Prophylaxis: Pepcid  -GI following - r/o hepatorenal syndrome. Undergoing albumin trial (350mL over 4 hours of Albumin 25% - pt received one dose yesterday and in the process of another dose today)  -Child-Au Score: 7 (abd surgery 30% periop mortality risk)    5. /Renal:   -MEG this admission: worsening, renal following -- continue with albumin trial and monitor closely.   -BUN/Cr: 33/6  -Trend Cr on AM labs  -Monitor UOP  -Replete electrolytes as needed for goal K+ 4.0, Phos 3.0, Mg 2.0  +Condom catheter     6. ID:   -WBC: 14.6  -Continue with regular surveillance labs including CBC with diff, CMP, Mg, Phos, CRP, ABG, Ferritin, CK, Triglycerides, Procalcitonin, D-Dimer, EKG  -Additional labs q3d: ESR, T-cell subset, LDH, Ferritin, CK, Troponin, Coags  -COVID isolation protocol     7. Endo:  -A1C: 6.9  -DM: c/w with Lantus 6 and RISS  -TSH: 2.8  -Hypothyroid: c/w levothyroxine 50mcg     8. Heme:   -H/H: 7.5/22 (trend daily, transfuse for hgb <7)  -Continue to monitor on daily and q3d labs as above.  -DVT ppx: holding AC 2/2 (per General surgery) due to concerns of bleeding, trend H/H closely/daily -- Duplex LE (-) for DVT this morning -- monitor D-dimer closely     Disposition: Full Code.

## 2020-04-18 NOTE — PROGRESS NOTE ADULT - SUBJECTIVE AND OBJECTIVE BOX
Patient discussed with:     Primary Diagnosis: COVID-19 associated pneumonia    SUBJECTIVE:  Pt is feeling okay this morning but does admit he feels dizziness when he stands up. Otherwise has no complaints. Denies any CP, palpitations, SOB, wheezing, abd pain, n/v/d/c, fevers or chills.    OVERNIGHT EVENTS: no acute events     OBJECTIVE:  Vitals: Vital Signs Last 24 Hrs  T(C): 37.3 (18 Apr 2020 08:08), Max: 37.3 (18 Apr 2020 08:08)  T(F): 99.1 (18 Apr 2020 08:08), Max: 99.1 (18 Apr 2020 08:08)  HR: 94 (18 Apr 2020 08:08) (90 - 96)  BP: 151/67 (18 Apr 2020 08:08) (150/65 - 161/71)  BP(mean): --  RR: 19 (18 Apr 2020 08:08) (19 - 20)  SpO2: 96% (18 Apr 2020 08:08) (96% - 97%)  I&O:  I&O's Detail    17 Apr 2020 07:01  -  18 Apr 2020 07:00  --------------------------------------------------------  IN:    Albumin 25%: 350 mL    dextrose 5%: 540 mL    Enteral Tube Flush: 60 mL  Total IN: 950 mL    OUT:    Ileostomy: 450 mL    Nasoenteral Tube: 600 mL    Voided: 1150 mL  Total OUT: 2200 mL    Total NET: -1250 mL      PHYSICAL EXAM:   General: No acute distress.   HEENT: Normocephalic, atraumatic.  Exhibits appropriate hearing and visual acuity. No palpable neck masses or hoarseness.   Psych: Affect appropriate  Neuro: AAOx3, no AMS or focal deficits.   Pulm: no supplemental oxygen; CTA b/l, no w/r/r  Card: RRR, S1/S2, no m/r/g  Vascular: Pulses 2+ in b/l R, DP, PT. No LE edema b/l   GI: Mildly distended abdomen, mildly tender. Wound vac in place on laparotomy incision. Colostomy bag in place, appropriate output.   : +condom catheter  Skin: No obvious rash, abrasion, lesions. Normal skin color and turgor.     LABS:                         7.5    14.62 )-----------( 447      ( 18 Apr 2020 08:55 )             22.0   04-18    132<L>  |  103  |  29<H>  ----------------------------<  130<H>  4.7   |  14<L>  |  6.78<H>    Ca    8.3<L>      18 Apr 2020 08:55  Phos  7.0     04-18  Mg     2.2     04-18    TPro  6.5  /  Alb  3.2<L>  /  TBili  0.2  /  DBili  x   /  AST  14  /  ALT  12  /  AlkPhos  92  04-18  PT/INR - ( 17 Apr 2020 13:46 )   PT: 15.6 sec;   INR: 1.36          PTT - ( 17 Apr 2020 13:46 )  PTT:31.4 sec  Ferritin, Serum: 475 ng/mL (04-18-20 @ 08:55)  D-Dimer Assay, Quantitative: 3149 ng/mL DDU (04-18-20 @ 08:55)  Ferritin, Serum: 499 ng/mL (04-17-20 @ 13:46)  D-Dimer Assay, Quantitative: 2863 ng/mL DDU (04-17-20 @ 13:46)  D-Dimer Assay, Quantitative: 3186 ng/mL DDU (04-17-20 @ 12:55)  CAPILLARY BLOOD GLUCOSE      POCT Blood Glucose.: 116 mg/dL (18 Apr 2020 06:28)    MEDICATIONS  (STANDING):  amLODIPine   Tablet 10 milliGRAM(s) Oral daily  atorvastatin 10 milliGRAM(s) Oral at bedtime  gabapentin 100 milliGRAM(s) Oral at bedtime  insulin glargine Injectable (LANTUS) 6 Unit(s) SubCutaneous at bedtime  insulin regular  human corrective regimen sliding scale   SubCutaneous every 6 hours  levothyroxine 50 MICROGram(s) Oral daily  pantoprazole  Injectable 80 milliGRAM(s) IV Push every 12 hours  polyethylene glycol 3350 17 Gram(s) Oral two times a day  senna 2 Tablet(s) Oral at bedtime  sodium bicarbonate 1300 milliGRAM(s) Oral three times a day    MEDICATIONS  (PRN):  dextrose 40% Gel 15 Gram(s) Oral once PRN Blood Glucose LESS THAN 70 milliGRAM(s)/deciliter  glucagon  Injectable 1 milliGRAM(s) IntraMuscular once PRN Glucose LESS THAN 70 milligrams/deciliter  traZODone 50 milliGRAM(s) Oral at bedtime PRN Sleep      RADIOLOGY/OTHER STUDIES:  < from: Xray Chest 1 View- PORTABLE-Urgent (04.17.20 @ 07:39) >  IMPRESSION:  NG tube noted with distal aspect obscured in abdomen. Recommend x-ray abdomen.  < end of copied text >

## 2020-04-18 NOTE — PROGRESS NOTE ADULT - ATTENDING COMMENTS
A/P 60yMale with hx of DM presenting for management of pneumatosis now s/p subtotal colectomy with finding of covid infection    1.  DM: controlled  lantus 6 at bedtime,   Continue lispro moderate dose scale with meals and at bedtime.   Continue consistent carb diet  FSG Goal 100-180    2.  Hyperlipidemia - goal LDL < 70  lipitor 10mg daily    3.  Obesity - outpatient medical management.      4.  Hypothyroidism - continue levothyroxine 50mcg daily    Pt is advised to follow up with me at discharge by calling .      Evelyne Underwood MD, PhD  Endocrinology  [address] [place] [address] [address]  [place] [address]  [phone] Tel  [phone] Fax  [email or web address]

## 2020-04-18 NOTE — PROGRESS NOTE ADULT - SUBJECTIVE AND OBJECTIVE BOX
INTERVAL HPI/OVERNIGHT EVENTS:    Patient is a 60y old  Male who presents with a chief complaint of colectomy (18 Apr 2020 10:56)  no acute overnight events  NG tube placed  now on full liquid diet tolerating well  good respiratory status on room air  insulin administration reviewed    MEDICATIONS  (STANDING):  amLODIPine   Tablet 10 milliGRAM(s) Oral daily  atorvastatin 10 milliGRAM(s) Oral at bedtime  dextrose 5% 1000 milliLiter(s) (60 mL/Hr) IV Continuous <Continuous>  dextrose 5%. 1000 milliLiter(s) (50 mL/Hr) IV Continuous <Continuous>  dextrose 50% Injectable 12.5 Gram(s) IV Push once  dextrose 50% Injectable 25 Gram(s) IV Push once  dextrose 50% Injectable 25 Gram(s) IV Push once  gabapentin 100 milliGRAM(s) Oral at bedtime  heparin   Injectable 5000 Unit(s) SubCutaneous every 12 hours  insulin glargine Injectable (LANTUS) 6 Unit(s) SubCutaneous at bedtime  insulin regular  human corrective regimen sliding scale   SubCutaneous every 6 hours  lactulose Syrup 20 Gram(s) Oral three times a day  levothyroxine 50 MICROGram(s) Oral daily  pantoprazole  Injectable 80 milliGRAM(s) IV Push every 12 hours  sodium bicarbonate 1300 milliGRAM(s) Oral three times a day    MEDICATIONS  (PRN):  dextrose 40% Gel 15 Gram(s) Oral once PRN Blood Glucose LESS THAN 70 milliGRAM(s)/deciliter  glucagon  Injectable 1 milliGRAM(s) IntraMuscular once PRN Glucose LESS THAN 70 milligrams/deciliter  traZODone 50 milliGRAM(s) Oral at bedtime PRN Sleep      Past medical history reviewed  Family history reviewed  Social history reviewed    PHYSICAL EXAM  Vital Signs Last 24 Hrs  T(C): 36.8 (18 Apr 2020 16:57), Max: 37.3 (18 Apr 2020 08:08)  T(F): 98.2 (18 Apr 2020 16:57), Max: 99.1 (18 Apr 2020 08:08)  HR: 96 (18 Apr 2020 16:57) (90 - 96)  BP: 153/65 (18 Apr 2020 16:57) (150/65 - 161/71)  BP(mean): --  RR: 20 (18 Apr 2020 16:57) (19 - 20)  SpO2: 94% (18 Apr 2020 16:57) (94% - 97%)    Due to the nature of this patient’s COVID-19 isolation status (either confirmed or suspected), this note was prepared without a bedside physical examination to prevent spread of infection and to conserve personal protective equipment during this nationwide pandemic. If possible, direct patient communication occurred via electronic measures or telephone conversation. Examination highlights were provided by a bedside nurse wearing personal protective equipment and review of pertinent medical records. Objective data (vital signs, urine output, lab results, imaging studies, medications, etc) were reviewed in detail. Face to face visits and physical examination have been limited only to patients for whom it is required for medical decision making.    LABS:                        7.5    14.62 )-----------( 447      ( 18 Apr 2020 08:55 )             22.0     04-18    132<L>  |  103  |  29<H>  ----------------------------<  130<H>  4.7   |  14<L>  |  6.78<H>    Ca    8.3<L>      18 Apr 2020 08:55  Phos  7.0     04-18  Mg     2.2     04-18    TPro  6.5  /  Alb  3.2<L>  /  TBili  0.2  /  DBili  x   /  AST  14  /  ALT  12  /  AlkPhos  92  04-18    PT/INR - ( 17 Apr 2020 13:46 )   PT: 15.6 sec;   INR: 1.36          PTT - ( 17 Apr 2020 13:46 )  PTT:31.4 sec    Thyroid Stimulating Hormone, Serum: 2.813 uIU/mL (04-14 @ 07:28)  Thyroid Stimulating Hormone, Serum: 2.877 uIU/mL (04-14 @ 00:34)      HbA1C: 6.9 % (04-05 @ 06:59)  7.6 % (03-31 @ 05:58)    CAPILLARY BLOOD GLUCOSE      POCT Blood Glucose.: 153 mg/dL (18 Apr 2020 16:59)  POCT Blood Glucose.: 143 mg/dL (18 Apr 2020 11:47)  POCT Blood Glucose.: 116 mg/dL (18 Apr 2020 06:28)  POCT Blood Glucose.: 96 mg/dL (17 Apr 2020 21:46)

## 2020-04-18 NOTE — CHART NOTE - NSCHARTNOTEFT_GEN_A_CORE
After Dr. Giorgi Luz spoke with Dr. Yrn Baker, General surgery resident, regarding pt's IV access, surgery team will come and assess patient this evening to attempt to place alternate access. If unsuccessful, team will place IJ or central line if necessary on patient in order to obtain adequate access.    Of note: patient does have a temporary IV that was placed on the left foot in the interm but more secure access should be established for this patient.

## 2020-04-18 NOTE — PROGRESS NOTE ADULT - ASSESSMENT
59 y/o M with PMH of HTN, DM complicated by neuropathy, EtOH liver cirrhosis, presenting w/ pneumatosis of unknown etiology, s/p sub total colectomy and end ileostomy on 3/31, c/b fever and purulent wound infection (4/9), ascites now s/p paracentesis 4/13. Course further complicated by worsening MEG,  anemia, and COVID+     1. ETOH cirrhosis- MELD-Na of 27 today.  - Ascites: s/p para on 4/10, 140 cc sent. Cytology negative, but not sent for cell count/culture. Patient has been on zosyn for 2 weeks so doubt SBP  - HE: On lactulose at home, currently oriented. Recommend continuing with lactulose, goal 2 to 3 BMs a day  - Varices: Unknown. H/h relatively stable without signs of active bleeding or melena in ostomy. Recommend EGD as outpatient to evaluate  - HCC: no lesion seen on CT, recommend screening q6 months  - MEG: Creatine continues to rise. Renal us shows no hydro, also possibly pre -renal vs ATN from Vancomycin, contrast. Hepatorenal is a diagnosis of exclusion recommend Albumin challenge 1g/kg IV x2 days, appreciate Nephrology recommendations   - Anemia: Normocytic. Hemoglobin responded to transfusion with no signs of active bleeding. Recommend EGD/colonoscopy as outpatient.    Recommendations discussed with primary team  Case discussed with attending physician

## 2020-04-19 LAB
ALBUMIN SERPL ELPH-MCNC: 3.4 G/DL — SIGNIFICANT CHANGE UP (ref 3.3–5)
ALP SERPL-CCNC: 109 U/L — SIGNIFICANT CHANGE UP (ref 40–120)
ALT FLD-CCNC: 10 U/L — SIGNIFICANT CHANGE UP (ref 10–45)
ANION GAP SERPL CALC-SCNC: 14 MMOL/L — SIGNIFICANT CHANGE UP (ref 5–17)
APPEARANCE UR: CLEAR — SIGNIFICANT CHANGE UP
APPEARANCE UR: CLEAR — SIGNIFICANT CHANGE UP
APTT BLD: 32.3 SEC — SIGNIFICANT CHANGE UP (ref 27.5–36.3)
AST SERPL-CCNC: 15 U/L — SIGNIFICANT CHANGE UP (ref 10–40)
BASOPHILS # BLD AUTO: 0.03 K/UL — SIGNIFICANT CHANGE UP (ref 0–0.2)
BASOPHILS NFR BLD AUTO: 0.1 % — SIGNIFICANT CHANGE UP (ref 0–2)
BILIRUB SERPL-MCNC: 0.4 MG/DL — SIGNIFICANT CHANGE UP (ref 0.2–1.2)
BILIRUB UR-MCNC: NEGATIVE — SIGNIFICANT CHANGE UP
BILIRUB UR-MCNC: NEGATIVE — SIGNIFICANT CHANGE UP
BLD GP AB SCN SERPL QL: NEGATIVE — SIGNIFICANT CHANGE UP
BUN SERPL-MCNC: 33 MG/DL — HIGH (ref 7–23)
C DIFF GDH STL QL: NEGATIVE — SIGNIFICANT CHANGE UP
C DIFF GDH STL QL: SIGNIFICANT CHANGE UP
CALCIUM SERPL-MCNC: 8.4 MG/DL — SIGNIFICANT CHANGE UP (ref 8.4–10.5)
CHLORIDE SERPL-SCNC: 101 MMOL/L — SIGNIFICANT CHANGE UP (ref 96–108)
CO2 SERPL-SCNC: 20 MMOL/L — LOW (ref 22–31)
COLOR SPEC: YELLOW — SIGNIFICANT CHANGE UP
COLOR SPEC: YELLOW — SIGNIFICANT CHANGE UP
CREAT ?TM UR-MCNC: 94 MG/DL — SIGNIFICANT CHANGE UP
CREAT SERPL-MCNC: 6.99 MG/DL — HIGH (ref 0.5–1.3)
CRP SERPL-MCNC: 33.17 MG/DL — HIGH (ref 0–0.4)
D DIMER BLD IA.RAPID-MCNC: 5413 NG/ML DDU — HIGH
DIFF PNL FLD: ABNORMAL
DIFF PNL FLD: ABNORMAL
EOSINOPHIL # BLD AUTO: 0.04 K/UL — SIGNIFICANT CHANGE UP (ref 0–0.5)
EOSINOPHIL NFR BLD AUTO: 0.2 % — SIGNIFICANT CHANGE UP (ref 0–6)
FERRITIN SERPL-MCNC: 633 NG/ML — HIGH (ref 30–400)
GLUCOSE BLDC GLUCOMTR-MCNC: 121 MG/DL — HIGH (ref 70–99)
GLUCOSE BLDC GLUCOMTR-MCNC: 121 MG/DL — HIGH (ref 70–99)
GLUCOSE BLDC GLUCOMTR-MCNC: 126 MG/DL — HIGH (ref 70–99)
GLUCOSE BLDC GLUCOMTR-MCNC: 145 MG/DL — HIGH (ref 70–99)
GLUCOSE BLDC GLUCOMTR-MCNC: 145 MG/DL — HIGH (ref 70–99)
GLUCOSE BLDC GLUCOMTR-MCNC: 184 MG/DL — HIGH (ref 70–99)
GLUCOSE SERPL-MCNC: 151 MG/DL — HIGH (ref 70–99)
GLUCOSE UR QL: NEGATIVE — SIGNIFICANT CHANGE UP
GLUCOSE UR QL: NEGATIVE — SIGNIFICANT CHANGE UP
HCT VFR BLD CALC: 21.7 % — LOW (ref 39–50)
HGB BLD-MCNC: 7.5 G/DL — LOW (ref 13–17)
IMM GRANULOCYTES NFR BLD AUTO: 2.8 % — HIGH (ref 0–1.5)
INR BLD: 1.72 — HIGH (ref 0.88–1.16)
KETONES UR-MCNC: NEGATIVE — SIGNIFICANT CHANGE UP
KETONES UR-MCNC: NEGATIVE — SIGNIFICANT CHANGE UP
LACTATE SERPL-SCNC: 1.1 MMOL/L — SIGNIFICANT CHANGE UP (ref 0.5–2)
LEUKOCYTE ESTERASE UR-ACNC: NEGATIVE — SIGNIFICANT CHANGE UP
LEUKOCYTE ESTERASE UR-ACNC: NEGATIVE — SIGNIFICANT CHANGE UP
LYMPHOCYTES # BLD AUTO: 0.7 K/UL — LOW (ref 1–3.3)
LYMPHOCYTES # BLD AUTO: 3.5 % — LOW (ref 13–44)
MAGNESIUM SERPL-MCNC: 1.9 MG/DL — SIGNIFICANT CHANGE UP (ref 1.6–2.6)
MCHC RBC-ENTMCNC: 29.2 PG — SIGNIFICANT CHANGE UP (ref 27–34)
MCHC RBC-ENTMCNC: 34.6 GM/DL — SIGNIFICANT CHANGE UP (ref 32–36)
MCV RBC AUTO: 84.4 FL — SIGNIFICANT CHANGE UP (ref 80–100)
MONOCYTES # BLD AUTO: 1.5 K/UL — HIGH (ref 0–0.9)
MONOCYTES NFR BLD AUTO: 7.5 % — SIGNIFICANT CHANGE UP (ref 2–14)
NEUTROPHILS # BLD AUTO: 17.27 K/UL — HIGH (ref 1.8–7.4)
NEUTROPHILS NFR BLD AUTO: 85.9 % — HIGH (ref 43–77)
NITRITE UR-MCNC: NEGATIVE — SIGNIFICANT CHANGE UP
NITRITE UR-MCNC: NEGATIVE — SIGNIFICANT CHANGE UP
NRBC # BLD: 0 /100 WBCS — SIGNIFICANT CHANGE UP (ref 0–0)
OSMOLALITY UR: 270 MOSMOL/KG — SIGNIFICANT CHANGE UP (ref 100–650)
PH UR: 6 — SIGNIFICANT CHANGE UP (ref 5–8)
PH UR: 6 — SIGNIFICANT CHANGE UP (ref 5–8)
PHOSPHATE SERPL-MCNC: 6.1 MG/DL — HIGH (ref 2.5–4.5)
PLATELET # BLD AUTO: 492 K/UL — HIGH (ref 150–400)
POTASSIUM SERPL-MCNC: 4.3 MMOL/L — SIGNIFICANT CHANGE UP (ref 3.5–5.3)
POTASSIUM SERPL-SCNC: 4.3 MMOL/L — SIGNIFICANT CHANGE UP (ref 3.5–5.3)
PROCALCITONIN SERPL-MCNC: 0.59 NG/ML — HIGH (ref 0.02–0.1)
PROT SERPL-MCNC: 6.8 G/DL — SIGNIFICANT CHANGE UP (ref 6–8.3)
PROT UR-MCNC: 30 MG/DL
PROT UR-MCNC: 30 MG/DL
PROTHROM AB SERPL-ACNC: 19.9 SEC — HIGH (ref 10–12.9)
RBC # BLD: 2.57 M/UL — LOW (ref 4.2–5.8)
RBC # FLD: 14.6 % — HIGH (ref 10.3–14.5)
RH IG SCN BLD-IMP: POSITIVE — SIGNIFICANT CHANGE UP
SODIUM SERPL-SCNC: 135 MMOL/L — SIGNIFICANT CHANGE UP (ref 135–145)
SODIUM UR-SCNC: 34 MMOL/L — SIGNIFICANT CHANGE UP
SP GR SPEC: 1.02 — SIGNIFICANT CHANGE UP (ref 1–1.03)
SP GR SPEC: 1.02 — SIGNIFICANT CHANGE UP (ref 1–1.03)
UROBILINOGEN FLD QL: 0.2 E.U./DL — SIGNIFICANT CHANGE UP
UROBILINOGEN FLD QL: 0.2 E.U./DL — SIGNIFICANT CHANGE UP
WBC # BLD: 20.1 K/UL — HIGH (ref 3.8–10.5)
WBC # FLD AUTO: 20.1 K/UL — HIGH (ref 3.8–10.5)

## 2020-04-19 PROCEDURE — 36600 WITHDRAWAL OF ARTERIAL BLOOD: CPT

## 2020-04-19 PROCEDURE — 99233 SBSQ HOSP IP/OBS HIGH 50: CPT | Mod: GC

## 2020-04-19 PROCEDURE — 99232 SBSQ HOSP IP/OBS MODERATE 35: CPT | Mod: GC

## 2020-04-19 PROCEDURE — 99232 SBSQ HOSP IP/OBS MODERATE 35: CPT

## 2020-04-19 PROCEDURE — 74176 CT ABD & PELVIS W/O CONTRAST: CPT | Mod: 26

## 2020-04-19 RX ORDER — MEROPENEM 1 G/30ML
500 INJECTION INTRAVENOUS EVERY 24 HOURS
Refills: 0 | Status: DISCONTINUED | OUTPATIENT
Start: 2020-04-20 | End: 2020-04-21

## 2020-04-19 RX ORDER — OCTREOTIDE ACETATE 200 UG/ML
100 INJECTION, SOLUTION INTRAVENOUS; SUBCUTANEOUS EVERY 8 HOURS
Refills: 0 | Status: DISCONTINUED | OUTPATIENT
Start: 2020-04-19 | End: 2020-04-21

## 2020-04-19 RX ORDER — LEVOTHYROXINE SODIUM 125 MCG
37.5 TABLET ORAL
Refills: 0 | Status: DISCONTINUED | OUTPATIENT
Start: 2020-04-19 | End: 2020-04-21

## 2020-04-19 RX ORDER — ACETAMINOPHEN 500 MG
500 TABLET ORAL EVERY 6 HOURS
Refills: 0 | Status: DISCONTINUED | OUTPATIENT
Start: 2020-04-19 | End: 2020-04-20

## 2020-04-19 RX ORDER — HEPARIN SODIUM 5000 [USP'U]/ML
7500 INJECTION INTRAVENOUS; SUBCUTANEOUS EVERY 8 HOURS
Refills: 0 | Status: DISCONTINUED | OUTPATIENT
Start: 2020-04-19 | End: 2020-06-05

## 2020-04-19 RX ORDER — MEROPENEM 1 G/30ML
1000 INJECTION INTRAVENOUS ONCE
Refills: 0 | Status: COMPLETED | OUTPATIENT
Start: 2020-04-19 | End: 2020-04-19

## 2020-04-19 RX ORDER — MIDODRINE HYDROCHLORIDE 2.5 MG/1
7.5 TABLET ORAL EVERY 8 HOURS
Refills: 0 | Status: DISCONTINUED | OUTPATIENT
Start: 2020-04-19 | End: 2020-04-21

## 2020-04-19 RX ORDER — HYDROMORPHONE HYDROCHLORIDE 2 MG/ML
0.25 INJECTION INTRAMUSCULAR; INTRAVENOUS; SUBCUTANEOUS ONCE
Refills: 0 | Status: DISCONTINUED | OUTPATIENT
Start: 2020-04-19 | End: 2020-04-19

## 2020-04-19 RX ORDER — SODIUM CHLORIDE 9 MG/ML
1000 INJECTION, SOLUTION INTRAVENOUS
Refills: 0 | Status: DISCONTINUED | OUTPATIENT
Start: 2020-04-19 | End: 2020-04-20

## 2020-04-19 RX ORDER — MEROPENEM 1 G/30ML
1000 INJECTION INTRAVENOUS EVERY 12 HOURS
Refills: 0 | Status: DISCONTINUED | OUTPATIENT
Start: 2020-04-19 | End: 2020-04-19

## 2020-04-19 RX ORDER — ALBUMIN HUMAN 25 %
100 VIAL (ML) INTRAVENOUS EVERY 8 HOURS
Refills: 0 | Status: DISCONTINUED | OUTPATIENT
Start: 2020-04-19 | End: 2020-04-20

## 2020-04-19 RX ORDER — SODIUM CHLORIDE 9 MG/ML
1000 INJECTION INTRAMUSCULAR; INTRAVENOUS; SUBCUTANEOUS
Refills: 0 | Status: DISCONTINUED | OUTPATIENT
Start: 2020-04-19 | End: 2020-04-19

## 2020-04-19 RX ADMIN — Medication 200 MILLIGRAM(S): at 23:48

## 2020-04-19 RX ADMIN — OCTREOTIDE ACETATE 100 MICROGRAM(S): 200 INJECTION, SOLUTION INTRAVENOUS; SUBCUTANEOUS at 21:25

## 2020-04-19 RX ADMIN — HYDROMORPHONE HYDROCHLORIDE 0.25 MILLIGRAM(S): 2 INJECTION INTRAMUSCULAR; INTRAVENOUS; SUBCUTANEOUS at 11:49

## 2020-04-19 RX ADMIN — INSULIN GLARGINE 6 UNIT(S): 100 INJECTION, SOLUTION SUBCUTANEOUS at 21:25

## 2020-04-19 RX ADMIN — INSULIN HUMAN 1: 100 INJECTION, SOLUTION SUBCUTANEOUS at 01:06

## 2020-04-19 RX ADMIN — Medication 400 MILLIGRAM(S): at 00:40

## 2020-04-19 RX ADMIN — Medication 50 MILLILITER(S): at 21:31

## 2020-04-19 RX ADMIN — PANTOPRAZOLE SODIUM 80 MILLIGRAM(S): 20 TABLET, DELAYED RELEASE ORAL at 05:44

## 2020-04-19 RX ADMIN — PANTOPRAZOLE SODIUM 80 MILLIGRAM(S): 20 TABLET, DELAYED RELEASE ORAL at 17:09

## 2020-04-19 RX ADMIN — SODIUM CHLORIDE 60 MILLILITER(S): 9 INJECTION, SOLUTION INTRAVENOUS at 01:06

## 2020-04-19 RX ADMIN — Medication 200 MILLIGRAM(S): at 17:18

## 2020-04-19 RX ADMIN — MEROPENEM 100 MILLIGRAM(S): 1 INJECTION INTRAVENOUS at 10:35

## 2020-04-19 RX ADMIN — INSULIN GLARGINE 6 UNIT(S): 100 INJECTION, SOLUTION SUBCUTANEOUS at 00:21

## 2020-04-19 RX ADMIN — HEPARIN SODIUM 7500 UNIT(S): 5000 INJECTION INTRAVENOUS; SUBCUTANEOUS at 17:10

## 2020-04-19 RX ADMIN — SODIUM CHLORIDE 100 MILLILITER(S): 9 INJECTION, SOLUTION INTRAVENOUS at 17:17

## 2020-04-19 RX ADMIN — Medication 37.5 MICROGRAM(S): at 06:59

## 2020-04-19 RX ADMIN — SODIUM CHLORIDE 80 MILLILITER(S): 9 INJECTION INTRAMUSCULAR; INTRAVENOUS; SUBCUTANEOUS at 10:34

## 2020-04-19 RX ADMIN — HEPARIN SODIUM 5000 UNIT(S): 5000 INJECTION INTRAVENOUS; SUBCUTANEOUS at 05:40

## 2020-04-19 RX ADMIN — MIDODRINE HYDROCHLORIDE 7.5 MILLIGRAM(S): 2.5 TABLET ORAL at 21:25

## 2020-04-19 NOTE — PROGRESS NOTE ADULT - ATTENDING COMMENTS
COVID+  EtOH cirrhosis  EGD and a colonoscopy as outpt.  High output from ostomy -- switch lactulose to rifaximin.

## 2020-04-19 NOTE — PROGRESS NOTE ADULT - ASSESSMENT
Hospital Course:  This is a 59 y/o M with PMH of HTN, DM complicated by neuropathy, ETOH liver cirrhosis, who presents to [facility] for fall at home. At [facility] patient reported that he takes lactulose at home daily and has daily BM, but he had no BM for 5 days prior to admission. 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 Patient underwent ex lap and subtotal colectomy, and ileostomy on 3/31. Pt had hyperglycemia at this time and started on insulin gtt briefly. He initially tested negative for COVID 19, but subsequent test was positive, patient was transferred to tele COVID unit without need for supplemental oxygen.     Pt had issues with hypertension during admission and remains on amlodipine briefly requiring hydralazine as well. Consulted GI to r/o hepatorenal syndrome and recommending albumin trial which pt is undergoing presently. On 4/15 renal consulted to w/u MEG and recommending no IVF due to low sodium. Concerned for metabolic acidosis and started on sodium bicarb TID. General surgery following patient and managing wound vac. Pt transfused x1 unit pRBCs and AC was held 2/2 to bleeding. On 4/17 H/H drop but not requiring transfusion and should be trended daily. on 4/18 H/H remains stable. Lactulose started per GI (pt should have 2-3 BM per day). AC was resumed due to stable H/H. On 4/19 pt noted to be febrile to 101.7 with increasing WBC count and distended abdomen. Ruling patient out for cdiff. Pending CT abdomen/plevis.     Plan:  1. Neurovascular: No delirium  -neuropathy: c/w Gabapentin   -Depression/mood: c/w Trazadone at night    2. Respiratory:   - (+) COVID  -No supplemental O2 required, 98% on RA  -COVID pneumonia:     -s/p x4 doses of HCQ and x3 doses of Azithro- stopped originally 2/2 to ?MEG  -CXR reviewed    3. Cardiovascular:   -HTN: amlodipine 10mg daily  -HLD: atorvastatin 10mg daily    -Difficulty IV access, IV present on right foot     4. GI:   -S/p laparotomy: wound vac in place (managed by general surgery)  -Ileostomy in place, functioning well   -Bowel regiment: started on lactulose 3 times a day (pt should be having 2-3 BM per day)   -Diet: Full liquids   -Prophylaxis: Pepcid  -GI following - r/o hepatorenal syndrome. Undergoing albumin trial (350mL over 4 hours of Albumin 25% - pt received one dose yesterday and in the process of another dose today)  -Child-Au Score: 7 (abd surgery 30% periop mortality risk)    5. /Renal:   -MEG this admission: worsening, renal following -- continue with albumin trial and monitor closely.   -BUN/Cr: 33/6  -Trend Cr on AM labs  -Monitor UOP  -Replete electrolytes as needed for goal K+ 4.0, Phos 3.0, Mg 2.0  +Condom catheter     6. ID:   -WBC: 14.6  -Continue with regular surveillance labs including CBC with diff, CMP, Mg, Phos, CRP, ABG, Ferritin, CK, Triglycerides, Procalcitonin, D-Dimer, EKG  -Additional labs q3d: ESR, T-cell subset, LDH, Ferritin, CK, Troponin, Coags  -COVID isolation protocol     7. Endo:  -A1C: 6.9  -DM: c/w with Lantus 6 and RISS  -TSH: 2.8  -Hypothyroid: c/w levothyroxine 50mcg     8. Heme:   -H/H: 7.5/22 (trend daily, transfuse for hgb <7)  -Continue to monitor on daily and q3d labs as above.  -DVT ppx: holding AC 2/2 (per General surgery) due to concerns of bleeding, trend H/H closely/daily -- Duplex LE (-) for DVT this morning -- monitor D-dimer closely     Disposition: Full Code. Hospital Course:  This is a 61 y/o M with PMH of HTN, DM complicated by neuropathy, ETOH liver cirrhosis, who presents to [facility] for fall at home. At [facility] patient reported that he takes lactulose at home daily and has daily BM, but he had no BM for 5 days prior to admission. 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 Patient underwent ex lap and subtotal colectomy, and ileostomy on 3/31. Pt had hyperglycemia at this time and started on insulin gtt briefly. He initially tested negative for COVID 19, but subsequent test was positive, patient was transferred to tele COVID unit without need for supplemental oxygen.     Pt had issues with hypertension during admission and remains on amlodipine briefly requiring hydralazine as well. Consulted GI to r/o hepatorenal syndrome and recommending albumin trial which pt is undergoing presently. On 4/15 renal consulted to w/u MEG and recommending no IVF due to low sodium. Concerned for metabolic acidosis and started on sodium bicarb TID. General surgery following patient and managing wound vac. Pt transfused x1 unit pRBCs and AC was held 2/2 to bleeding. On 4/17 H/H drop but not requiring transfusion and should be trended daily. on 4/18 H/H remains stable. Lactulose started per GI (pt should have 2-3 BM per day). AC was resumed due to stable H/H. On 4/19 pt noted to be febrile to 101.7 with increasing WBC count and distended abdomen. Ruling patient out for cdiff. Pending urgent CT abd/pelvis.     Plan:  1. Neurovascular: No delirium  -neuropathy: c/w Gabapentin   -Depression/mood: c/w Trazadone at night    2. Respiratory:   - (+) COVID  -No supplemental O2 required, 98% on RA  -COVID pneumonia:     -s/p x4 doses of HCQ and x3 doses of Azithro- stopped originally 2/2 to ?MEG  -CXR reviewed    3. Cardiovascular:   -HTN: amlodipine 10mg daily  -HLD: atorvastatin 10mg daily    -Difficulty IV access, central line left TLC placed on 4/18     4. GI:   -S/p laparotomy: wound vac in place (managed by general surgery)  -Ileostomy in place, functioning well   -Bowel regiment: changed from lactulose to rifaximin 550mg BID   -Diet: NPO (due to ileus and gastroparesis), NGT in place- to remain on suction   -Prophylaxis: Pepcid  -GI following - s/p albumin trial, unclear if patient has hepatorenal syndrome, differ to nephro for diagnosis   -Child-Au Score: 7 (abd surgery 30% periop mortality risk)  -Pending CT abdomen/pelvis non-contrast due to new distention     5. /Renal:   -MEG this admission: worsening, renal following, recommending hydration with NS at maintenance rate (Dr. Heart)   -BUN/Cr: 33/6.99 (continues to increase daily)   -Trend Cr on AM labs  -Monitor UOP  -Replete electrolytes as needed for goal K+ 4.0, Phos 3.0, Mg 2.0  +Condom catheter     6. ID:   -WBC: 20.1 (elevated today)   -Continue with regular surveillance labs including CBC with diff, CMP, Mg, Phos, CRP, ABG, Ferritin, CK, Triglycerides, Procalcitonin, D-Dimer, EKG  -Additional labs q3d: ESR, T-cell subset, LDH, Ferritin, CK, Troponin, Coags  -COVID isolation protocol   -Febrile overnight with increasing WBC count: UA negative, pending blood cx results, started on meropenem     7. Endo:  -A1C: 6.9  -DM: c/w with Lantus 6 and RISS  -TSH: 2.8  -Hypothyroid: c/w levothyroxine 50mcg     8. Heme:   -H/H: 7.5/21.7 (trend daily, transfuse for hgb <7)  -Continue to monitor on daily and q3d labs as above.  -DVT ppx: on full AC due to elevated d-dimer (5413). DVT negative yesterday.     Disposition: Full Code. Hospital Course:  This is a 61 y/o M with PMH of HTN, DM complicated by neuropathy, ETOH liver cirrhosis, who presents to [facility] for fall at home. At [facility] patient reported that he takes lactulose at home daily and has daily BM, but he had no BM for 5 days prior to admission. 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 Patient underwent ex lap and subtotal colectomy, and ileostomy on 3/31. Pt had hyperglycemia at this time and started on insulin gtt briefly. He initially tested negative for COVID 19, but subsequent test was positive, patient was transferred to tele COVID unit without need for supplemental oxygen.     Pt had issues with hypertension during admission and remains on amlodipine briefly requiring hydralazine as well. Consulted GI to r/o hepatorenal syndrome and recommending albumin trial which pt is undergoing presently. Pt underwent paracentesis (w/o micro) on 4/13 with IR and results were negative. On 4/15 renal consulted to w/u MEG and recommending no IVF due to low sodium. Concerned for metabolic acidosis and started on sodium bicarb TID. General surgery following patient and managing wound vac. Pt transfused x1 unit pRBCs and AC was held 2/2 to bleeding. On 4/17 H/H drop but not requiring transfusion and should be trended daily. on 4/18 H/H remains stable. Lactulose started per GI (pt should have 2-3 BM per day). AC was resumed due to stable H/H. On 4/19 pt noted to be febrile to 101.7 with increasing WBC count and distended abdomen. Ruling patient out for cdiff. Pending urgent CT abd/pelvis.     Plan:  1. Neurovascular: No delirium  -neuropathy: c/w Gabapentin   -Depression/mood: c/w Trazadone at night    2. Respiratory:   - (+) COVID  -No supplemental O2 required, 98% on RA  -COVID pneumonia:     -s/p x4 doses of HCQ and x3 doses of Azithro- stopped originally 2/2 to ?MEG  -CXR reviewed    3. Cardiovascular:   -HTN: amlodipine 10mg daily  -HLD: atorvastatin 10mg daily    -Difficulty IV access, central line left TLC placed on 4/18     4. GI:   -S/p laparotomy: wound vac in place (managed by general surgery)  -Ileostomy in place, functioning well   -Bowel regiment: changed from lactulose to rifaximin 550mg BID   -Diet: NPO (due to ileus and gastroparesis), NGT in place- to remain on suction   -Prophylaxis: Pepcid  -GI following - s/p albumin trial, unclear if patient has hepatorenal syndrome, differ to nephro for diagnosis   -Child-Au Score: 7 (abd surgery 30% periop mortality risk)  -Pending CT abdomen/pelvis non-contrast due to new distention, abd pain, WBC count, fever.     5. /Renal:   -MEG this admission: worsening, renal following, recommending hydration with NS at maintenance rate (Dr. Heart)   -BUN/Cr: 33/6.99 (continues to increase daily)   -Trend Cr on AM labs  -Monitor UOP  -Replete electrolytes as needed for goal K+ 4.0, Phos 3.0, Mg 2.0  +Condom catheter     6. ID:   -WBC: 20.1 (elevated today)   -Continue with regular surveillance labs including CBC with diff, CMP, Mg, Phos, CRP, ABG, Ferritin, CK, Triglycerides, Procalcitonin, D-Dimer, EKG  -Additional labs q3d: ESR, T-cell subset, LDH, Ferritin, CK, Troponin, Coags  -COVID isolation protocol   -Febrile overnight with increasing WBC count: UA negative, pending blood cx results, started on meropenem (per ID Dr. Ulloa approved for 48 hours of 500mg BID)   -Per ID, consider repeat paracentesis with micro     7. Endo:  -A1C: 6.9  -DM: c/w with Lantus 6 and RISS  -TSH: 2.8  -Hypothyroid: c/w levothyroxine 50mcg     8. Heme:   -H/H: 7.5/21.7 (trend daily, transfuse for hgb <7)  -Continue to monitor on daily and q3d labs as above.  -DVT ppx: on full AC due to elevated d-dimer (5413). DVT negative yesterday.     Disposition: Full Code. Hospital Course:  This is a 59 y/o M with PMH of HTN, DM complicated by neuropathy, IVDU, ETOH liver cirrhosis, who presents to [facility] for fall at home. At [facility] patient reported that he takes lactulose at home daily and has daily BM, but he had no BM for 5 days prior to admission. 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 Patient underwent ex lap and subtotal colectomy, and ileostomy on 3/31. Pt had hyperglycemia at this time and started on insulin gtt briefly. He initially tested negative for COVID 19, but subsequent test was positive, patient was transferred to tele COVID unit without need for supplemental oxygen.     Pt had issues with hypertension during admission and remains on amlodipine briefly requiring hydralazine as well. Consulted GI to r/o hepatorenal syndrome and recommending albumin trial which pt is undergoing presently. Pt underwent paracentesis (w/o micro) on 4/13 with IR and results were negative. On 4/15 renal consulted to w/u MEG and recommending no IVF due to low sodium. Concerned for metabolic acidosis and started on sodium bicarb TID. General surgery following patient and managing wound vac. Pt transfused x1 unit pRBCs and AC was held 2/2 to bleeding. On 4/17 H/H drop but not requiring transfusion and should be trended daily. on 4/18 H/H remains stable. Lactulose started per GI (pt should have 2-3 BM per day). AC was resumed due to stable H/H. On 4/19 pt noted to be febrile to 101.7 with increasing WBC count and distended abdomen. Ruling patient out for cdiff. Pending urgent CT abd/pelvis.     Plan:  1. Neurovascular: No delirium  -neuropathy: c/w Gabapentin   -Depression/mood: c/w Trazadone at night    2. Respiratory:   - (+) COVID  -No supplemental O2 required, 98% on RA  -COVID pneumonia:     -s/p x4 doses of HCQ and x3 doses of Azithro- stopped originally 2/2 to ?MEG  -CXR reviewed    3. Cardiovascular:   -HTN: amlodipine 10mg daily  -HLD: atorvastatin 10mg daily    -Difficulty IV access, central line left TLC placed on 4/18     4. GI:   -S/p laparotomy: wound vac in place (managed by general surgery)  -Ileostomy in place, functioning well   -Bowel regiment: changed from lactulose to rifaximin 550mg BID   -Diet: NPO (due to ileus and gastroparesis), NGT in place- to remain on suction   -Prophylaxis: Pepcid  -GI following - s/p albumin trial, unclear if patient has hepatorenal syndrome, differ to nephro for diagnosis   -Child-Au Score: 7 (abd surgery 30% periop mortality risk)  -Pending CT abdomen/pelvis non-contrast due to new distention, abd pain, WBC count, fever.     5. /Renal:   -MEG this admission: worsening, renal following, recommending hydration with NS at maintenance rate (Dr. Heart)   -BUN/Cr: 33/6.99 (continues to increase daily)   -Trend Cr on AM labs  -Monitor UOP  -Replete electrolytes as needed for goal K+ 4.0, Phos 3.0, Mg 2.0  +Condom catheter     6. ID:   -WBC: 20.1 (elevated today)   -Continue with regular surveillance labs including CBC with diff, CMP, Mg, Phos, CRP, ABG, Ferritin, CK, Triglycerides, Procalcitonin, D-Dimer, EKG  -Additional labs q3d: ESR, T-cell subset, LDH, Ferritin, CK, Troponin, Coags  -COVID isolation protocol   -Febrile overnight with increasing WBC count: UA negative, pending blood cx results, started on meropenem (per ID Dr. Ulloa approved for 48 hours of 500mg BID)   -Per ID, consider repeat paracentesis with micro     7. Endo:  -A1C: 6.9  -DM: c/w with Lantus 6 and RISS  -TSH: 2.8  -Hypothyroid: c/w levothyroxine 50mcg     8. Heme:   -H/H: 7.5/21.7 (trend daily, transfuse for hgb <7) -- s/p x2 transfusions this admission  -Continue to monitor on daily and q3d labs as above.  -DVT ppx: on heparin 5000U Q12 units Q12 (reduced dose 2/2 to anemia per Dr. Montgomery) d-dimer remains elevated/increasing (5413). DVT negative yesterday.     Disposition: Full Code. Hospital Course:  This is a 61 y/o M with PMH of HTN, DM complicated by neuropathy, IVDU, ETOH liver cirrhosis, who presents to [facility] for fall at home. At [facility] patient reported that he takes lactulose at home daily and has daily BM, but he had no BM for 5 days prior to admission. 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 Patient underwent ex lap and subtotal colectomy, and ileostomy on 3/31. Pt had hyperglycemia at this time and started on insulin gtt briefly. He initially tested negative for COVID 19, but subsequent test was positive, patient was transferred to tele COVID unit without need for supplemental oxygen.     Pt had issues with hypertension during admission and remains on amlodipine briefly requiring hydralazine as well.  Pt underwent paracentesis (w/o micro) on 4/13 with IR and results were negative. Consulted GI and nephro for worsening MEG to r/o hepatorenal syndrome and recommend albumin trial which was completed without improvement of MEG.On 4/15 renal consulted to w/u MEG and recommending no IVF due to low sodium. Concerned for metabolic acidosis and started on sodium bicarb TID. General surgery following patient and managing wound vac and NGT (keep to suction due to ileus). Pt transfused x1 unit pRBCs and AC was held 2/2 to bleeding. On 4/17 H/H drop but not requiring transfusion and should be trended daily. On 4/18 H/H remains stable. Lactulose started per GI (pt should have 2-3 BM per day). AC was resumed due to stable H/H. On 4/19 pt noted to be febrile to 101.7 with increasing WBC count and distended abdomen. Laculose was d/c and started on Rifaxin per Dr. Garcia. Ruling patient out for cdiff. Pending urgent CT abd/pelvis.     Plan:  1. Neurovascular: No delirium  -neuropathy: c/w Gabapentin   -Depression/mood: c/w Trazadone at night    2. Respiratory:   - (+) COVID  -No supplemental O2 required, 98% on RA  -COVID pneumonia:     -s/p x4 doses of HCQ and x3 doses of Azithro- stopped originally 2/2 to ?MEG  -CXR reviewed    3. Cardiovascular:   -HTN: amlodipine 10mg daily  -HLD: atorvastatin 10mg daily    -Difficult IV access, central line left TLC placed on 4/18     4. GI:   -S/p laparotomy: wound vac in place (managed by general surgery)  -Ileostomy in place, functioning well   -Bowel regiment: changed from lactulose to rifaximin 550mg BID   -Diet: NPO (due to ileus and gastroparesis), NGT in place- to remain on suction   -Prophylaxis: Pepcid  -GI following - s/p albumin trial, unclear if patient has hepatorenal syndrome, differ to nephro for diagnosis   -Child-Au Score: 7 (abd surgery 30% periop mortality risk)  -Pending CT abdomen/pelvis non-contrast due to new distention, abd pain, WBC count, fever.     5. /Renal:   -MEG this admission: worsening, renal following, recommending hydration with NS at maintenance rate (Dr. Heart)   -BUN/Cr: 33/6.99 (continues to increase daily)   -Trend Cr on AM labs  -Monitor UOP  -Replete electrolytes as needed for goal K+ 4.0, Phos 3.0, Mg 2.0  +Condom catheter     6. ID:   -WBC: 20.1 (elevated today)   -Continue with regular surveillance labs including CBC with diff, CMP, Mg, Phos, CRP, ABG, Ferritin, CK, Triglycerides, Procalcitonin, D-Dimer, EKG  -Additional labs q3d: ESR, T-cell subset, LDH, Ferritin, CK, Troponin, Coags  -COVID isolation protocol   -Febrile 102 this afternoon and overnight with increasing WBC count: UA negative, pending blood cx results, started on meropenem (per ID Dr. Ulloa approved for 48 hours of 500mg BID)   -Per ID, consider repeat paracentesis with micro     7. Endo:  -A1C: 6.9  -DM: c/w with Lantus 6 and RISS  -TSH: 2.8  -Hypothyroid: c/w levothyroxine 50mcg     8. Heme:   -H/H: 7.5/21.7 (trend daily, transfuse for hgb <7) -- s/p x2 transfusions this admission  -Continue to monitor on daily and q3d labs as above.  -DVT ppx: on heparin 5000U Q12 units Q12 (reduced dose 2/2 to anemia per Dr. Montgomery) d-dimer remains elevated/increasing (5413). DVT negative yesterday.     Disposition: Full Code. Hospital Course:  This is a 59 y/o M with PMH of HTN, DM complicated by neuropathy, IVDU, ETOH liver cirrhosis, who presents to [facility] for fall at home. At [facility] patient reported that he takes lactulose at home daily and has daily BM, but he had no BM for 5 days prior to admission. 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 Patient underwent ex lap and subtotal colectomy, and ileostomy on 3/31. Pt had hyperglycemia at this time and started on insulin gtt briefly. He initially tested negative for COVID 19, but subsequent test was positive, patient was transferred to tele COVID unit without need for supplemental oxygen.     Pt had issues with hypertension during admission and remains on amlodipine briefly requiring hydralazine as well.  Pt underwent paracentesis (w/o micro) on 4/13 with IR and results were negative. Consulted GI and nephro for worsening MEG to r/o hepatorenal syndrome and recommend albumin trial which was completed without improvement of MEG.On 4/15 renal consulted to w/u MEG and recommending no IVF due to low sodium. Concerned for metabolic acidosis and started on sodium bicarb TID. General surgery following patient and managing wound vac and NGT (keep to suction due to ileus). Pt transfused x1 unit pRBCs and AC was held 2/2 to bleeding. On 4/17 H/H drop but not requiring transfusion and should be trended daily. On 4/18 H/H remains stable. Lactulose started per GI (pt should have 2-3 BM per day). AC was resumed due to stable H/H. On 4/19 pt noted to be febrile to 101.7 with increasing WBC count and distended abdomen. Laculose was d/c and started on Rifaxin per Dr. Garcia. Ruling patient out for cdiff. Pending urgent CT abd/pelvis.     Plan:  1. Neurovascular: No delirium  -neuropathy: c/w Gabapentin   -Depression/mood: c/w Trazadone at night    2. Respiratory:   - (+) COVID  -No supplemental O2 required, 98% on RA  -COVID pneumonia:     -s/p x4 doses of HCQ and x3 doses of Azithro- stopped originally 2/2 to ?MEG  -CXR reviewed    3. Cardiovascular:   -restart amlodipine and atorvastatin once can tolerate PO meds   -Difficult IV access, central line left TLC placed on 4/18     4. GI:   -S/p laparotomy: wound vac in place (managed by general surgery)  -Ileostomy in place, functioning well   -Bowel regiment: changed from lactulose to rifaximin 550mg BID   -Diet: NPO (due to ileus and gastroparesis), NGT in place- to remain on suction - took off all PO meds   -Prophylaxis: Pepcid  -GI following - s/p albumin trial, unclear if patient has hepatorenal syndrome, differ to nephro for diagnosis   -Child-Au Score: 7 (abd surgery 30% periop mortality risk)  -Pending CT abdomen/pelvis non-contrast due to new distention, abd pain, WBC count, fever.     5. /Renal:   -MEG this admission: worsening, renal following, recommending hydration with NS at maintenance rate (Dr. Heart)   -BUN/Cr: 33/6.99 (continues to increase daily)   -Trend Cr on AM labs  -Monitor UOP  -Replete electrolytes as needed for goal K+ 4.0, Phos 3.0, Mg 2.0  +Condom catheter     6. ID:   -WBC: 20.1 (elevated today)   -Continue with regular surveillance labs including CBC with diff, CMP, Mg, Phos, CRP, ABG, Ferritin, CK, Triglycerides, Procalcitonin, D-Dimer, EKG  -Additional labs q3d: ESR, T-cell subset, LDH, Ferritin, CK, Troponin, Coags  -COVID isolation protocol   -Febrile 102 this afternoon and overnight with increasing WBC count: UA negative, pending blood cx results, started on meropenem (per ID Dr. Ulloa approved for 48 hours of 500mg BID)   -Per ID, consider repeat paracentesis with micro     7. Endo:  -A1C: 6.9  -DM: c/w with Lantus 6 and RISS  -TSH: 2.8  -Hypothyroid: c/w levothyroxine 50mcg     8. Heme:   -H/H: 7.5/21.7 (trend daily, transfuse for hgb <7) -- s/p x2 transfusions this admission  -Continue to monitor on daily and q3d labs as above.  -DVT ppx: on heparin 5000U Q12 units Q12 (reduced dose 2/2 to anemia per Dr. Montgomery) d-dimer remains elevated/increasing (5413). DVT negative yesterday.     Disposition: Full Code.

## 2020-04-19 NOTE — PROCEDURE NOTE - ADDITIONAL PROCEDURE DETAILS
arterial stick for blood culture
Unable to obtain any fluid despite needle stephanie confirmed in the abdominal cavity with bedside ultrasound.

## 2020-04-19 NOTE — PROGRESS NOTE ADULT - SUBJECTIVE AND OBJECTIVE BOX
GASTROENTEROLOGY PROGRESS NOTE  Chart reviewed and case discussed with primary team    PERTINENT REVIEW OF SYSTEMS:  As per primary team    Allergies    No Known Allergies    Intolerances      MEDICATIONS:  MEDICATIONS  (STANDING):  amLODIPine   Tablet 10 milliGRAM(s) Oral daily  atorvastatin 10 milliGRAM(s) Oral at bedtime  dextrose 5% 1000 milliLiter(s) (60 mL/Hr) IV Continuous <Continuous>  dextrose 5%. 1000 milliLiter(s) (50 mL/Hr) IV Continuous <Continuous>  dextrose 50% Injectable 12.5 Gram(s) IV Push once  dextrose 50% Injectable 25 Gram(s) IV Push once  dextrose 50% Injectable 25 Gram(s) IV Push once  gabapentin 100 milliGRAM(s) Oral at bedtime  heparin   Injectable 5000 Unit(s) SubCutaneous every 12 hours  insulin glargine Injectable (LANTUS) 6 Unit(s) SubCutaneous at bedtime  insulin regular  human corrective regimen sliding scale   SubCutaneous every 6 hours  levothyroxine Injectable 37.5 MICROGram(s) IV Push <User Schedule>  meropenem  IVPB 1000 milliGRAM(s) IV Intermittent once  meropenem  IVPB 1000 milliGRAM(s) IV Intermittent every 12 hours  pantoprazole  Injectable 80 milliGRAM(s) IV Push every 12 hours  rifAXIMin 550 milliGRAM(s) Oral two times a day  sodium bicarbonate 1300 milliGRAM(s) Oral three times a day  sodium chloride 0.9%. 1000 milliLiter(s) (80 mL/Hr) IV Continuous <Continuous>    MEDICATIONS  (PRN):  dextrose 40% Gel 15 Gram(s) Oral once PRN Blood Glucose LESS THAN 70 milliGRAM(s)/deciliter  glucagon  Injectable 1 milliGRAM(s) IntraMuscular once PRN Glucose LESS THAN 70 milligrams/deciliter  traZODone 50 milliGRAM(s) Oral at bedtime PRN Sleep    Vital Signs Last 24 Hrs  T(C): 38.2 (2020 08:13), Max: 38.7 (2020 01:02)  T(F): 100.8 (2020 08:13), Max: 101.7 (2020 01:02)  HR: 101 (2020 08:13) (93 - 109)  BP: 164/71 (2020 08:13) (140/65 - 166/75)  BP(mean): --  RR: 20 (2020 08:13) ( - 22)  SpO2: 94% (2020 08:13) (93% - 94%)    04-18 @ 07:01  -   @ 07:00  --------------------------------------------------------  IN: 2490 mL / OUT: 3410 mL / NET: -920 mL      PHYSICAL EXAM:  As per primary team     LABS:                        7.5    20.10 )-----------( 492      ( 2020 09:14 )             21.7         135  |  101  |  33<H>  ----------------------------<  151<H>  4.3   |  20<L>  |  6.99<H>    Ca    8.4      2020 09:14  Phos  6.1       Mg     1.9         TPro  6.8  /  Alb  3.4  /  TBili  0.4  /  DBili  x   /  AST  15  /  ALT  10  /  AlkPhos  109  -19    PT/INR - ( 2020 09:14 )   PT: 19.9 sec;   INR: 1.72          PTT - ( 2020 09:14 )  PTT:32.3 sec      Urinalysis Basic - ( 2020 06:13 )    Color: Yellow / Appearance: Clear / S.020 / pH: x  Gluc: x / Ketone: NEGATIVE  / Bili: Negative / Urobili: 0.2 E.U./dL   Blood: x / Protein: 30 mg/dL / Nitrite: NEGATIVE   Leuk Esterase: NEGATIVE / RBC: < 5 /HPF / WBC < 5 /HPF   Sq Epi: x / Non Sq Epi: 0-5 /HPF / Bacteria: None /HPF                RADIOLOGY & ADDITIONAL STUDIES:  Reviewed

## 2020-04-19 NOTE — PROGRESS NOTE ADULT - SUBJECTIVE AND OBJECTIVE BOX
Patient seen and examined at bedside.   c.o n/v, weakness, abd pain   febrile    acetaminophen  IVPB .. 500 milliGRAM(s) every 6 hours  dextrose 40% Gel 15 Gram(s) once PRN  dextrose 5% 1000 milliLiter(s) <Continuous>  dextrose 5%. 1000 milliLiter(s) <Continuous>  dextrose 50% Injectable 12.5 Gram(s) once  dextrose 50% Injectable 25 Gram(s) once  dextrose 50% Injectable 25 Gram(s) once  glucagon  Injectable 1 milliGRAM(s) once PRN  heparin   Injectable 7500 Unit(s) every 8 hours  insulin glargine Injectable (LANTUS) 6 Unit(s) at bedtime  insulin regular  human corrective regimen sliding scale   every 6 hours  levothyroxine Injectable 37.5 MICROGram(s) <User Schedule>  pantoprazole  Injectable 80 milliGRAM(s) every 12 hours  rifAXIMin 550 milliGRAM(s) two times a day      Allergies    No Known Allergies    Intolerances        T(C): , Max: 39.1 (20 @ 13:38)  T(F): , Max: 102.3 (20 @ 13:38)  HR: 108 (20 @ 13:24)  BP: 155/70 (20 @ 13:24)  BP(mean): --  RR: 20 (20 @ 13:24)  SpO2: 92% (20 @ 13:24)  Wt(kg): --     @ 07:01  -   @ 07:00  --------------------------------------------------------  IN:    Albumin 25%: 350 mL    dextrose 5%: 1920 mL    dextrose 5%: 120 mL    Solution: 100 mL  Total IN: 2490 mL    OUT:    Ileostomy: 550 mL    Nasoenteral Tube: 2150 mL    Voided: 710 mL  Total OUT: 3410 mL    Total NET: -920 mL       @ 07:01  -   @ 17:29  --------------------------------------------------------  IN:    dextrose 5%: 180 mL    dextrose 5%: 100 mL    sodium chloride 0.9%: 320 mL  Total IN: 600 mL    OUT:    Ileostomy: 150 mL  Total OUT: 150 mL    Total NET: 450 mL              PHYSICAL EXAM: limited due to COVID  Constitutional: tired,  No acute distress on RA  ENMT: Moist mucous membrane.  No cyanosis.  Neck: No JVD.  Cardiovascular:  Regular rate and rhythm.    Gastrointestinal: softly distended, tender  Extremities: Warm.  No lower extremity edema.    Neurological: No focal deficits.  Skin: Warm. Dry.     Psychiatric: Normal affect.    ACCESS:       LABS:                        7.5    20.10 )-----------( 492      ( 2020 09:14 )             21.7         135  |  101  |  33<H>  ----------------------------<  151<H>  4.3   |  20<L>  |  6.99<H>    Ca    8.4      2020 09:14  Phos  6.1       Mg     1.9         TPro  6.8  /  Alb  3.4  /  TBili  0.4  /  DBili  x   /  AST  15  /  ALT  10  /  AlkPhos  109        PT/INR - ( 2020 09:14 )   PT: 19.9 sec;   INR: 1.72          PTT - ( 2020 09:14 )  PTT:32.3 sec  Urinalysis Basic - ( 2020 14:29 )    Color: Yellow / Appearance: Clear / S.020 / pH: x  Gluc: x / Ketone: NEGATIVE  / Bili: Negative / Urobili: 0.2 E.U./dL   Blood: x / Protein: 30 mg/dL / Nitrite: NEGATIVE   Leuk Esterase: NEGATIVE / RBC: < 5 /HPF / WBC < 5 /HPF   Sq Epi: x / Non Sq Epi: 0-5 /HPF / Bacteria: Present /HPF      Osmolality, Random Urine: 270 mosmol/kg [100 - 650] ( @ 14:29)  Sodium, Random Urine: 34 mmol/L ( @ 14:29)        RADIOLOGY & ADDITIONAL STUDIES:

## 2020-04-19 NOTE — CHART NOTE - NSCHARTNOTEFT_GEN_A_CORE
Infectious Diseases Anti-infective Approval Note    Medication: meropenem  Dose: 500mg   Route: IV  Frequency: q24h  Duration: 2 days    Dose may be adjusted as needed for alterations in renal function.    *THIS IS NOT AN INFECTIOUS DISEASES CONSULTATION*

## 2020-04-19 NOTE — PROGRESS NOTE ADULT - ASSESSMENT
59 y/o M with PMH of HTN, DM complicated by neuropathy, EtOH liver cirrhosis, presenting w/ pneumatosis of unknown etiology, s/p sub total colectomy and end ileostomy on 3/31, c/b fever and purulent wound infection (4/9), ascites now s/p paracentesis 4/13. Course further complicated by worsening MEG,  anemia, and COVID+     1. ETOH cirrhosis- MELD-Na of 28 today.  - Ascites: s/p para on 4/10, 140 cc sent. Cytology negative, but not sent for cell count/culture. Patient has been on zosyn for 2 weeks so doubt SBP.  - HE: On lactulose at home, currently oriented. Given high output from ostomy and worsening renal failure, would switch Lactulose to Rifaximin.  - Varices: Unknown. H/h relatively stable without signs of active bleeding or melena in ostomy. Recommend EGD as outpatient to evaluate.  - HCC: no lesion seen on CT, recommend screening q6 months.  - MEG: Creatine continues to rise. Renal us shows no hydro. Continues to rise despite Albumin. Timing of urine electrolytes not optimal, but compared to morning CMP suggestive of intrinsic renal injury. Appreciate Nephrology recommendations.  - Anemia: Normocytic. Hemoglobin responded to transfusion with no signs of active bleeding. Recommend EGD/colonoscopy as outpatient.    Recommendations discussed with primary team  Case discussed with attending physician

## 2020-04-19 NOTE — PROGRESS NOTE ADULT - ASSESSMENT
MEG -- likely ATN due to vanco I suspect (received high dose 4/9-4/13)   nonoliguric   still I suspect on dry side and I/O have been negative due to high GI output-- urine Na low side   I think less likely hepatorenal   can continue gentle IVF-- NS or hco3 drip at 60 cc/hour for now   follow uo. deuce   no indication for HD now- will cont follow

## 2020-04-19 NOTE — PROGRESS NOTE ADULT - ATTENDING COMMENTS
A/P 60yMale with hx of DM presenting for management of pneumatosis, now s/p subtotal colectomy c.b COVID, now with concern for ileus    1.  DM:  likely type 2, controlled  can continue lantus 6 units at bedtime  Continue regualr insulin ISF-25 every 6 hours while pt NPO  FSG Goal 100-180    2.  Hyperlipidemia - goal LDL < 70  continue atorvastatin    3.  Obesity - outpatient medical management.      4.  Hypothyroidism - continue levothyroxine IV 30mcg daily while pt NPO, resume 50mcg PO when pt able to take PO    Pt is advised to follow up with me at discharge by calling .      Evelyne Underwood MD, PhD  Endocrinology  [address]  [place] [address]  [phone] Tel  [phone] Fax  [email or web address]

## 2020-04-19 NOTE — PROGRESS NOTE ADULT - ASSESSMENT
61 y/o M with PMH of HTN, DM complicated by neuropathy, EtOH liver cirrhosis, presenting w/ pneumatosis of unknown etiology, s/p sub total colectomy and end ileostomy on 3/31, c/b fever and purulent wound infection (4/9) now s/p paracentesis 4/13    Neuro: Pain/Nausea control  CV: HDS but hypertensive, lisinopril 40 mg and amlodipine 10mg, Labetalol 20 mg PRN BP >170, hydralazine 10 mg prn  Pulm: CED RAMIREZ: CLD, Ileostomy pink, ostomy functioning, received ostomy teaching.  : Voids, urinating in bed- inaccurate I/Os  ID: Zosyn (4/9-), vanc (4/9-4/13), azithro 4/13-4/16, plaquenil 4/13-4/16  Endo: ISS, lantus 6, synthroid  PPx: SCDs, SQH, OOBA  PT/OT: initial evaluation 4/3  Wounds: Vac in place

## 2020-04-19 NOTE — PROGRESS NOTE ADULT - SUBJECTIVE AND OBJECTIVE BOX
INTERVAL HPI/OVERNIGHT EVENTS:    Patient is a 60y old  Male who presents with a chief complaint of Colectomy (2020 10:46)  Pt now with NG tube on suction, remains NPO  insulin administration reviewed  saturating well on room air  remains on abx  elevated WBC noted, temp 102.       MEDICATIONS  (STANDING):  acetaminophen  IVPB .. 500 milliGRAM(s) IV Intermittent every 6 hours  dextrose 5% 1000 milliLiter(s) (100 mL/Hr) IV Continuous <Continuous>  dextrose 5%. 1000 milliLiter(s) (50 mL/Hr) IV Continuous <Continuous>  dextrose 50% Injectable 12.5 Gram(s) IV Push once  dextrose 50% Injectable 25 Gram(s) IV Push once  dextrose 50% Injectable 25 Gram(s) IV Push once  heparin   Injectable 7500 Unit(s) SubCutaneous every 8 hours  insulin glargine Injectable (LANTUS) 6 Unit(s) SubCutaneous at bedtime  insulin regular  human corrective regimen sliding scale   SubCutaneous every 6 hours  levothyroxine Injectable 37.5 MICROGram(s) IV Push <User Schedule>  pantoprazole  Injectable 80 milliGRAM(s) IV Push every 12 hours  rifAXIMin 550 milliGRAM(s) Oral two times a day    MEDICATIONS  (PRN):  dextrose 40% Gel 15 Gram(s) Oral once PRN Blood Glucose LESS THAN 70 milliGRAM(s)/deciliter  glucagon  Injectable 1 milliGRAM(s) IntraMuscular once PRN Glucose LESS THAN 70 milligrams/deciliter      Past medical history reviewed  Family history reviewed  Social history reviewed    PHYSICAL EXAM  Vital Signs Last 24 Hrs  T(C): 39.1 (2020 13:38), Max: 39.1 (2020 13:38)  T(F): 102.3 (2020 13:38), Max: 102.3 (2020 13:38)  HR: 108 (2020 13:24) (93 - 109)  BP: 155/70 (2020 13:24) (140/65 - 166/75)  BP(mean): --  RR: 20 (2020 13:24) (20 - 22)  SpO2: 92% (2020 13:24) (92% - 94%)    Due to the nature of this patient’s COVID-19 isolation status (either confirmed or suspected), this note was prepared without a bedside physical examination to prevent spread of infection and to conserve personal protective equipment during this nationwide pandemic. If possible, direct patient communication occurred via electronic measures or telephone conversation. Examination highlights were provided by a bedside nurse wearing personal protective equipment and review of pertinent medical records. Objective data (vital signs, urine output, lab results, imaging studies, medications, etc) were reviewed in detail. Face to face visits and physical examination have been limited only to patients for whom it is required for medical decision making.    LABS:                        7.5    20.10 )-----------( 492      ( 2020 09:14 )             21.7         135  |  101  |  33<H>  ----------------------------<  151<H>  4.3   |  20<L>  |  6.99<H>    Ca    8.4      2020 09:14  Phos  6.1       Mg     1.9         TPro  6.8  /  Alb  3.4  /  TBili  0.4  /  DBili  x   /  AST  15  /  ALT  10  /  AlkPhos  109  19    PT/INR - ( 2020 09:14 )   PT: 19.9 sec;   INR: 1.72          PTT - ( 2020 09:14 )  PTT:32.3 sec  Urinalysis Basic - ( 2020 14:29 )    Color: Yellow / Appearance: Clear / S.020 / pH: x  Gluc: x / Ketone: NEGATIVE  / Bili: Negative / Urobili: 0.2 E.U./dL   Blood: x / Protein: 30 mg/dL / Nitrite: NEGATIVE   Leuk Esterase: NEGATIVE / RBC: < 5 /HPF / WBC < 5 /HPF   Sq Epi: x / Non Sq Epi: 0-5 /HPF / Bacteria: Present /HPF      Thyroid Stimulating Hormone, Serum: 2.813 uIU/mL ( @ 07:28)  Thyroid Stimulating Hormone, Serum: 2.877 uIU/mL ( @ 00:34)      HbA1C: 6.9 % ( @ 06:59)  7.6 % ( @ 05:58)    CAPILLARY BLOOD GLUCOSE      POCT Blood Glucose.: 121 mg/dL (2020 16:49)  POCT Blood Glucose.: 145 mg/dL (2020 11:32)  POCT Blood Glucose.: 145 mg/dL (2020 05:37)  POCT Blood Glucose.: 184 mg/dL (2020 00:10)

## 2020-04-19 NOTE — PROGRESS NOTE ADULT - SUBJECTIVE AND OBJECTIVE BOX
INTERVAL HPI/OVERNIGHT EVENTS: Overnight central line was placed. This AM pt seen and examined by chief resident. Denies any current n/v/cp/sob.     STATUS POST:  ex lap, subtotal colectomy/IR paracentesis    POST OPERATIVE DAY #:     MEDICATIONS  (STANDING):  amLODIPine   Tablet 10 milliGRAM(s) Oral daily  atorvastatin 10 milliGRAM(s) Oral at bedtime  dextrose 5% 1000 milliLiter(s) (60 mL/Hr) IV Continuous <Continuous>  dextrose 5%. 1000 milliLiter(s) (50 mL/Hr) IV Continuous <Continuous>  dextrose 50% Injectable 12.5 Gram(s) IV Push once  dextrose 50% Injectable 25 Gram(s) IV Push once  dextrose 50% Injectable 25 Gram(s) IV Push once  gabapentin 100 milliGRAM(s) Oral at bedtime  heparin   Injectable 5000 Unit(s) SubCutaneous every 12 hours  insulin glargine Injectable (LANTUS) 6 Unit(s) SubCutaneous at bedtime  insulin regular  human corrective regimen sliding scale   SubCutaneous every 6 hours  lactulose Syrup 20 Gram(s) Oral three times a day  levothyroxine Injectable 37.5 MICROGram(s) IV Push <User Schedule>  pantoprazole  Injectable 80 milliGRAM(s) IV Push every 12 hours  sodium bicarbonate 1300 milliGRAM(s) Oral three times a day    MEDICATIONS  (PRN):  dextrose 40% Gel 15 Gram(s) Oral once PRN Blood Glucose LESS THAN 70 milliGRAM(s)/deciliter  glucagon  Injectable 1 milliGRAM(s) IntraMuscular once PRN Glucose LESS THAN 70 milligrams/deciliter  traZODone 50 milliGRAM(s) Oral at bedtime PRN Sleep      Vital Signs Last 24 Hrs  T(C): 37.4 (2020 05:54), Max: 38.7 (2020 01:02)  T(F): 99.3 (2020 05:54), Max: 101.7 (2020 01:02)  HR: 93 (2020 05:54) (93 - 109)  BP: 159/70 (2020 05:54) (140/65 - 166/75)  BP(mean): --  RR: 20 (2020 05:54) (20 - 22)  SpO2: 94% (2020 05:54) (93% - 94%)    PHYSICAL EXAM:      Constitutional: A&Ox3    Respiratory: non labored breathing, no respiratory distress    Cardiovascular: NSR, RRR    Gastrointestinal: soft, non-tender, distended                 Incision: vac in place with no leak, ostomy with greenish liquid output, no melena. NGT in place    Extremities: (-) edema                  I&O's Detail    2020 07:01  -  2020 07:00  --------------------------------------------------------  IN:    Albumin 25%: 350 mL    dextrose 5%: 1920 mL    dextrose 5%: 120 mL    Solution: 100 mL  Total IN: 2490 mL    OUT:    Ileostomy: 550 mL    Nasoenteral Tube: 2150 mL    Voided: 710 mL  Total OUT: 3410 mL    Total NET: -920 mL          LABS:                        7.5    14.62 )-----------( 447      ( 2020 08:55 )             22.0     04-18    132<L>  |  103  |  29<H>  ----------------------------<  130<H>  4.7   |  14<L>  |  6.78<H>    Ca    8.3<L>      2020 08:55  Phos  7.0     04-18  Mg     2.2     04-18    TPro  6.5  /  Alb  3.2<L>  /  TBili  0.2  /  DBili  x   /  AST  14  /  ALT  12  /  AlkPhos  92  04-18    PT/INR - ( 2020 13:46 )   PT: 15.6 sec;   INR: 1.36          PTT - ( 2020 13:46 )  PTT:31.4 sec  Urinalysis Basic - ( 2020 06:13 )    Color: Yellow / Appearance: Clear / S.020 / pH: x  Gluc: x / Ketone: NEGATIVE  / Bili: Negative / Urobili: 0.2 E.U./dL   Blood: x / Protein: 30 mg/dL / Nitrite: NEGATIVE   Leuk Esterase: NEGATIVE / RBC: < 5 /HPF / WBC < 5 /HPF   Sq Epi: x / Non Sq Epi: 0-5 /HPF / Bacteria: None /HPF        RADIOLOGY & ADDITIONAL STUDIES:

## 2020-04-19 NOTE — PROVIDER CONTACT NOTE (OTHER) - ASSESSMENT
Temp 101.7  RR 20 O2 sat 94% in RA. Pt just had a Left IJ triple lumen catheter inserted this evening.

## 2020-04-19 NOTE — PROGRESS NOTE ADULT - SUBJECTIVE AND OBJECTIVE BOX
Patient discussed with: Dr. Montgomery     Primary Diagnosis: COVID-19 associated pneumonia    SUBJECTIVE:  Patient has no specific complaints this morning but states he "doesn't feel well" and feeds tired. He admits he is hungry and wishes he could eat. Denies any CP, palpitations, SOB, wheezing, abd pain, n/v/d/c, fevers or chills.    OVERNIGHT EVENTS: febrile to 101.7F     OBJECTIVE:  Vitals: Vital Signs Last 24 Hrs  T(C): 38.2 (2020 08:13), Max: 38.7 (2020 01:02)  T(F): 100.8 (2020 08:13), Max: 101.7 (2020 01:02)  HR: 101 (2020 08:13) (93 - 109)  BP: 164/71 (2020 08:13) (140/65 - 166/75)  BP(mean): --  RR: 20 (2020 08:13) (20 - 22)  SpO2: 94% (2020 08:13) (93% - 94%)  I&O:  I&O's Detail    2020 07:01  -  2020 07:00  --------------------------------------------------------  IN:    Albumin 25%: 350 mL    dextrose 5%: 1920 mL    dextrose 5%: 120 mL    Solution: 100 mL  Total IN: 2490 mL    OUT:    Ileostomy: 550 mL    Nasoenteral Tube: 2150 mL    Voided: 710 mL  Total OUT: 3410 mL    Total NET: -920 mL      PHYSICAL EXAM:   General: No acute distress.   HEENT: Normocephalic, atraumatic.  Exhibits appropriate hearing and visual acuity. No palpable neck masses or hoarseness.   Psych: Affect appropriate  Neuro: AAOx3, no AMS or focal deficits.   Pulm: no supplemental O2 ; CTA b/l, no w/r/r.  Card: RRR, S1/S2, no m/r/g.   Vascular: Pulses 2+ in b/l R, DP, PT. No LE edema b/l.   GI: ND, NBS, non-TTP.  : no Guajardo   Skin: No obvious rash, abrasion, lesions. Normal skin color and turgor.       LABS:                         7.5    20.10 )-----------( 492      ( 2020 09:14 )             21.7       135  |  101  |  33<H>  ----------------------------<  151<H>  4.3   |  20<L>  |  6.99<H>    Ca    8.4      2020 09:14  Phos  6.1       Mg     1.9         TPro  6.8  /  Alb  3.4  /  TBili  0.4  /  DBili  x   /  AST  15  /  ALT  10  /  AlkPhos  109    PT/INR - ( 2020 09:14 )   PT: 19.9 sec;   INR: 1.72          PTT - ( 2020 09:14 )  PTT:32.3 secUrinalysis Basic - ( 2020 06:13 )    Color: Yellow / Appearance: Clear / S.020 / pH: x  Gluc: x / Ketone: NEGATIVE  / Bili: Negative / Urobili: 0.2 E.U./dL   Blood: x / Protein: 30 mg/dL / Nitrite: NEGATIVE   Leuk Esterase: NEGATIVE / RBC: < 5 /HPF / WBC < 5 /HPF   Sq Epi: x / Non Sq Epi: 0-5 /HPF / Bacteria: None /HPF      Ferritin, Serum: 633 ng/mL (20 @ 09:14)  D-Dimer Assay, Quantitative: 5413 ng/mL DDU (20 @ 09:14)  CAPILLARY BLOOD GLUCOSE      POCT Blood Glucose.: 145 mg/dL (2020 05:37)    MEDICATIONS  (STANDING):  amLODIPine   Tablet 10 milliGRAM(s) Oral daily  atorvastatin 10 milliGRAM(s) Oral at bedtime  gabapentin 100 milliGRAM(s) Oral at bedtime  heparin   Injectable 5000 Unit(s) SubCutaneous every 12 hours  insulin glargine Injectable (LANTUS) 6 Unit(s) SubCutaneous at bedtime  insulin regular  human corrective regimen sliding scale   SubCutaneous every 6 hours  levothyroxine Injectable 37.5 MICROGram(s) IV Push <User Schedule>  meropenem  IVPB 1000 milliGRAM(s) IV Intermittent every 12 hours  pantoprazole  Injectable 80 milliGRAM(s) IV Push every 12 hours  rifAXIMin 550 milliGRAM(s) Oral two times a day  sodium bicarbonate 1300 milliGRAM(s) Oral three times a day  sodium chloride 0.9%. 1000 milliLiter(s) (80 mL/Hr) IV Continuous <Continuous>    MEDICATIONS  (PRN):  dextrose 40% Gel 15 Gram(s) Oral once PRN Blood Glucose LESS THAN 70 milliGRAM(s)/deciliter  glucagon  Injectable 1 milliGRAM(s) IntraMuscular once PRN Glucose LESS THAN 70 milligrams/deciliter  traZODone 50 milliGRAM(s) Oral at bedtime PRN Sleep      RADIOLOGY/OTHER STUDIES:  < from: Xray Chest 1 View- PORTABLE-Urgent (20 @ 20:59) >  IMPRESSION:  1.  Interval placement of a left-sided central line with its tip projecting over the SVC. No pneumothorax.  2.  Hazy opacities are seen with a mid/lower lung zone predominance, slightly progressed.   < end of copied text > Patient discussed with: Dr. Montgomery     Primary Diagnosis: COVID-19 associated pneumonia    SUBJECTIVE:  Patient has no specific complaints this morning but states he "doesn't feel well" and feeds tired. He admits he is hungry and wishes he could eat. Denies any CP, palpitations, SOB, wheezing, abd pain, n/v/d/c, fevers or chills.    OVERNIGHT EVENTS: febrile to 101.7F, increasing WBC count     OBJECTIVE:  Vitals: Vital Signs Last 24 Hrs  T(C): 38.2 (2020 08:13), Max: 38.7 (2020 01:02)  T(F): 100.8 (2020 08:13), Max: 101.7 (2020 01:02)  HR: 101 (2020 08:13) (93 - 109)  BP: 164/71 (2020 08:13) (140/65 - 166/75)  BP(mean): --  RR: 20 (2020 08:13) (20 - 22)  SpO2: 94% (2020 08:13) (93% - 94%)  I&O:  I&O's Detail    2020 07:01  -  2020 07:00  --------------------------------------------------------  IN:    Albumin 25%: 350 mL    dextrose 5%: 1920 mL    dextrose 5%: 120 mL    Solution: 100 mL  Total IN: 2490 mL    OUT:    Ileostomy: 550 mL    Nasoenteral Tube: 2150 mL    Voided: 710 mL  Total OUT: 3410 mL    Total NET: -920 mL      PHYSICAL EXAM:   General: No acute distress.   HEENT: Normocephalic, atraumatic.  Exhibits appropriate hearing and visual acuity. No palpable neck masses or hoarseness.   Psych: Affect appropriate  Neuro: AAOx3, no AMS or focal deficits.   Pulm: no supplemental O2 ; CTA b/l, no w/r/r.  Card: RRR, S1/S2, no m/r/g.   Vascular: Pulses 2+ in b/l R, DP, PT. No LE edema b/l.   GI: ND, NBS, non-TTP.  : no Guajardo   Skin: No obvious rash, abrasion, lesions. Normal skin color and turgor.       LABS:                         7.5    20.10 )-----------( 492      ( 2020 09:14 )             21.7       135  |  101  |  33<H>  ----------------------------<  151<H>  4.3   |  20<L>  |  6.99<H>    Ca    8.4      2020 09:14  Phos  6.1       Mg     1.9         TPro  6.8  /  Alb  3.4  /  TBili  0.4  /  DBili  x   /  AST  15  /  ALT  10  /  AlkPhos  109    PT/INR - ( 2020 09:14 )   PT: 19.9 sec;   INR: 1.72          PTT - ( 2020 09:14 )  PTT:32.3 secUrinalysis Basic - ( 2020 06:13 )    Color: Yellow / Appearance: Clear / S.020 / pH: x  Gluc: x / Ketone: NEGATIVE  / Bili: Negative / Urobili: 0.2 E.U./dL   Blood: x / Protein: 30 mg/dL / Nitrite: NEGATIVE   Leuk Esterase: NEGATIVE / RBC: < 5 /HPF / WBC < 5 /HPF   Sq Epi: x / Non Sq Epi: 0-5 /HPF / Bacteria: None /HPF      Ferritin, Serum: 633 ng/mL (20 @ 09:14)  D-Dimer Assay, Quantitative: 5413 ng/mL DDU (20 @ 09:14)  CAPILLARY BLOOD GLUCOSE      POCT Blood Glucose.: 145 mg/dL (2020 05:37)    MEDICATIONS  (STANDING):  amLODIPine   Tablet 10 milliGRAM(s) Oral daily  atorvastatin 10 milliGRAM(s) Oral at bedtime  gabapentin 100 milliGRAM(s) Oral at bedtime  heparin   Injectable 5000 Unit(s) SubCutaneous every 12 hours  insulin glargine Injectable (LANTUS) 6 Unit(s) SubCutaneous at bedtime  insulin regular  human corrective regimen sliding scale   SubCutaneous every 6 hours  levothyroxine Injectable 37.5 MICROGram(s) IV Push <User Schedule>  meropenem  IVPB 1000 milliGRAM(s) IV Intermittent every 12 hours  pantoprazole  Injectable 80 milliGRAM(s) IV Push every 12 hours  rifAXIMin 550 milliGRAM(s) Oral two times a day  sodium bicarbonate 1300 milliGRAM(s) Oral three times a day  sodium chloride 0.9%. 1000 milliLiter(s) (80 mL/Hr) IV Continuous <Continuous>    MEDICATIONS  (PRN):  dextrose 40% Gel 15 Gram(s) Oral once PRN Blood Glucose LESS THAN 70 milliGRAM(s)/deciliter  glucagon  Injectable 1 milliGRAM(s) IntraMuscular once PRN Glucose LESS THAN 70 milligrams/deciliter  traZODone 50 milliGRAM(s) Oral at bedtime PRN Sleep      RADIOLOGY/OTHER STUDIES:  < from: Xray Chest 1 View- PORTABLE-Urgent (20 @ 20:59) >  IMPRESSION:  1.  Interval placement of a left-sided central line with its tip projecting over the SVC. No pneumothorax.  2.  Hazy opacities are seen with a mid/lower lung zone predominance, slightly progressed.   < end of copied text > Hospital Course:  This is a 59 y/o M with PMH of HTN, DM complicated by neuropathy, ETOH liver cirrhosis, who presents to [facility] for fall at home. At [facility] patient reported that he takes lactulose at home daily and has daily BM, but he had no BM for 5 days prior to admission. 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 Patient underwent ex lap and subtotal colectomy, and ileostomy on 3/31. Pt had hyperglycemia at this time and started on insulin gtt briefly. He initially tested negative for COVID 19, but subsequent test was positive, patient was transferred to tele COVID unit without need for supplemental oxygen.     Pt had issues with hypertension during admission and remains on amlodipine briefly requiring hydralazine as well. Consulted GI to r/o hepatorenal syndrome and recommending albumin trial which pt is undergoing presently. Pt underwent paracentesis (w/o micro) on  with IR and results were negative. On 4/15 renal consulted to w/u MEG and recommending no IVF due to low sodium. Concerned for metabolic acidosis and started on sodium bicarb TID. General surgery following patient and managing wound vac. Pt transfused x1 unit pRBCs and AC was held 2/2 to bleeding. On  H/H drop but not requiring transfusion and should be trended daily. on  H/H remains stable. Lactulose started per GI (pt should have 2-3 BM per day). AC was resumed due to stable H/H. On  pt noted to be febrile to 101.7 with increasing WBC count and distended abdomen. Pending urgent CT abd/pelvis. ID approved 48 hours of meropenem (per Dr. Montgomery). Ruling patient out for cdiff.     Patient discussed with: Dr. Montgomery     Primary Diagnosis: COVID-19 associated pneumonia    SUBJECTIVE:  Patient has no specific complaints this morning but states he "doesn't feel well" and feeds tired. He admits he is hungry and wishes he could eat. Denies any CP, palpitations, SOB, wheezing, abd pain, n/v/d/c, fevers or chills.    OVERNIGHT EVENTS: febrile to 101.7F, increasing WBC count     OBJECTIVE:  Vitals: Vital Signs Last 24 Hrs  T(C): 38.2 (2020 08:13), Max: 38.7 (2020 01:02)  T(F): 100.8 (2020 08:13), Max: 101.7 (2020 01:02)  HR: 101 (2020 08:13) (93 - 109)  BP: 164/71 (2020 08:13) (140/65 - 166/75)  BP(mean): --  RR: 20 (2020 08:13) (20 - 22)  SpO2: 94% (2020 08:13) (93% - 94%)  I&O:  I&O's Detail    2020 07:01  -  2020 07:00  --------------------------------------------------------  IN:    Albumin 25%: 350 mL    dextrose 5%: 1920 mL    dextrose 5%: 120 mL    Solution: 100 mL  Total IN: 2490 mL    OUT:    Ileostomy: 550 mL    Nasoenteral Tube: 2150 mL    Voided: 710 mL  Total OUT: 3410 mL    Total NET: -920 mL      PHYSICAL EXAM:   General: No acute distress.   HEENT: Normocephalic, atraumatic.  Exhibits appropriate hearing and visual acuity. No palpable neck masses or hoarseness.   Psych: Affect appropriate  Neuro: AAOx3, no AMS or focal deficits.   Pulm: no supplemental O2 ; CTA b/l, no w/r/r.  Card: RRR, S1/S2, no m/r/g.   Vascular: Pulses 2+ in b/l R, DP, PT. No LE edema b/l.   GI: ND, NBS, non-TTP.  : no Guajardo   Skin: No obvious rash, abrasion, lesions. Normal skin color and turgor.       LABS:                         7.5    20.10 )-----------( 492      ( 2020 09:14 )             21.7   04-    135  |  101  |  33<H>  ----------------------------<  151<H>  4.3   |  20<L>  |  6.99<H>    Ca    8.4      2020 09:14  Phos  6.1       Mg     1.9         TPro  6.8  /  Alb  3.4  /  TBili  0.4  /  DBili  x   /  AST  15  /  ALT  10  /  AlkPhos  109    PT/INR - ( 2020 09:14 )   PT: 19.9 sec;   INR: 1.72          PTT - ( 2020 09:14 )  PTT:32.3 secUrinalysis Basic - ( 2020 06:13 )    Color: Yellow / Appearance: Clear / S.020 / pH: x  Gluc: x / Ketone: NEGATIVE  / Bili: Negative / Urobili: 0.2 E.U./dL   Blood: x / Protein: 30 mg/dL / Nitrite: NEGATIVE   Leuk Esterase: NEGATIVE / RBC: < 5 /HPF / WBC < 5 /HPF   Sq Epi: x / Non Sq Epi: 0-5 /HPF / Bacteria: None /HPF      Ferritin, Serum: 633 ng/mL (20 @ 09:14)  D-Dimer Assay, Quantitative: 5413 ng/mL DDU (20 @ 09:14)  CAPILLARY BLOOD GLUCOSE      POCT Blood Glucose.: 145 mg/dL (2020 05:37)    MEDICATIONS  (STANDING):  amLODIPine   Tablet 10 milliGRAM(s) Oral daily  atorvastatin 10 milliGRAM(s) Oral at bedtime  gabapentin 100 milliGRAM(s) Oral at bedtime  heparin   Injectable 5000 Unit(s) SubCutaneous every 12 hours  insulin glargine Injectable (LANTUS) 6 Unit(s) SubCutaneous at bedtime  insulin regular  human corrective regimen sliding scale   SubCutaneous every 6 hours  levothyroxine Injectable 37.5 MICROGram(s) IV Push <User Schedule>  meropenem  IVPB 1000 milliGRAM(s) IV Intermittent every 12 hours  pantoprazole  Injectable 80 milliGRAM(s) IV Push every 12 hours  rifAXIMin 550 milliGRAM(s) Oral two times a day  sodium bicarbonate 1300 milliGRAM(s) Oral three times a day  sodium chloride 0.9%. 1000 milliLiter(s) (80 mL/Hr) IV Continuous <Continuous>    MEDICATIONS  (PRN):  dextrose 40% Gel 15 Gram(s) Oral once PRN Blood Glucose LESS THAN 70 milliGRAM(s)/deciliter  glucagon  Injectable 1 milliGRAM(s) IntraMuscular once PRN Glucose LESS THAN 70 milligrams/deciliter  traZODone 50 milliGRAM(s) Oral at bedtime PRN Sleep      RADIOLOGY/OTHER STUDIES:  < from: Xray Chest 1 View- PORTABLE-Urgent (20 @ 20:59) >  IMPRESSION:  1.  Interval placement of a left-sided central line with its tip projecting over the SVC. No pneumothorax.  2.  Hazy opacities are seen with a mid/lower lung zone predominance, slightly progressed.   < end of copied text > Hospital Course:  This is a 61 y/o M with PMH of HTN, DM complicated by neuropathy, IVDU, ETOH liver cirrhosis, who presents to [facility] for fall at home. At [facility] patient reported that he takes lactulose at home daily and has daily BM, but he had no BM for 5 days prior to admission. 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 Patient underwent ex lap and subtotal colectomy, and ileostomy on 3/31. Pt had hyperglycemia at this time and started on insulin gtt briefly. He initially tested negative for COVID 19, but subsequent test was positive, patient was transferred to tele COVID unit without need for supplemental oxygen.     Pt had issues with hypertension during admission and remains on amlodipine briefly requiring hydralazine as well. Consulted GI to r/o hepatorenal syndrome and recommending albumin trial which pt is undergoing presently. Pt underwent paracentesis (w/o micro) on  with IR and results were negative. On 4/15 renal consulted to w/u MEG and recommending no IVF due to low sodium. Concerned for metabolic acidosis and started on sodium bicarb TID. General surgery following patient and managing wound vac. Pt transfused x1 unit pRBCs and AC was held 2/2 to bleeding. On  H/H drop but not requiring transfusion and should be trended daily. on  H/H remains stable. Lactulose started per GI (pt should have 2-3 BM per day). AC was resumed due to stable H/H. On  pt noted to be febrile to 101.7 with increasing WBC count and distended abdomen. Ruling patient out for cdiff. Pending urgent CT abd/pelvis.     Patient discussed with: Dr. Montgomery     Primary Diagnosis: COVID-19 associated pneumonia    SUBJECTIVE:  Patient has no specific complaints this morning but states he "doesn't feel well" and feeds tired. He admits he is hungry and wishes he could eat. Denies any CP, palpitations, SOB, wheezing, abd pain, n/v/d/c, fevers or chills.    OVERNIGHT EVENTS: febrile to 101.7F, increasing WBC count     OBJECTIVE:  Vitals: Vital Signs Last 24 Hrs  T(C): 38.2 (2020 08:13), Max: 38.7 (2020 01:02)  T(F): 100.8 (2020 08:13), Max: 101.7 (2020 01:02)  HR: 101 (2020 08:13) (93 - 109)  BP: 164/71 (2020 08:13) (140/65 - 166/75)  BP(mean): --  RR: 20 (2020 08:13) (20 - 22)  SpO2: 94% (2020 08:13) (93% - 94%)  I&O:  I&O's Detail    2020 07:01  -  2020 07:00  --------------------------------------------------------  IN:    Albumin 25%: 350 mL    dextrose 5%: 1920 mL    dextrose 5%: 120 mL    Solution: 100 mL  Total IN: 2490 mL    OUT:    Ileostomy: 550 mL    Nasoenteral Tube: 2150 mL    Voided: 710 mL  Total OUT: 3410 mL    Total NET: -920 mL      PHYSICAL EXAM:   General: No acute distress.   HEENT: Normocephalic, atraumatic. Exhibits appropriate hearing and visual acuity. No palpable neck masses or hoarseness.   Psych: Affect appropriate  Neuro: AAOx3, no AMS or focal deficits.   Pulm: no supplemental O2 ; CTA b/l, no w/r/r.  Card: RRR, S1/S2, no m/r/g.   Vascular: Pulses 2+ in b/l R, DP, PT. No LE edema b/l.   GI: ND, NBS, non-TTP.  : no Guajardo   Skin: No obvious rash, abrasion, lesions. Normal skin color and turgor.       LABS:                         7.5    20.10 )-----------( 492      ( 2020 09:14 )             21.7   04-19    135  |  101  |  33<H>  ----------------------------<  151<H>  4.3   |  20<L>  |  6.99<H>    Ca    8.4      2020 09:14  Phos  6.1     04-19  Mg     1.9         TPro  6.8  /  Alb  3.4  /  TBili  0.4  /  DBili  x   /  AST  15  /  ALT  10  /  AlkPhos  109    PT/INR - ( 2020 09:14 )   PT: 19.9 sec;   INR: 1.72          PTT - ( 2020 09:14 )  PTT:32.3 secUrinalysis Basic - ( 2020 06:13 )    Color: Yellow / Appearance: Clear / S.020 / pH: x  Gluc: x / Ketone: NEGATIVE  / Bili: Negative / Urobili: 0.2 E.U./dL   Blood: x / Protein: 30 mg/dL / Nitrite: NEGATIVE   Leuk Esterase: NEGATIVE / RBC: < 5 /HPF / WBC < 5 /HPF   Sq Epi: x / Non Sq Epi: 0-5 /HPF / Bacteria: None /HPF      Ferritin, Serum: 633 ng/mL (20 @ 09:14)  D-Dimer Assay, Quantitative: 5413 ng/mL DDU (20 @ 09:14)  CAPILLARY BLOOD GLUCOSE      POCT Blood Glucose.: 145 mg/dL (2020 05:37)    MEDICATIONS  (STANDING):  amLODIPine   Tablet 10 milliGRAM(s) Oral daily  atorvastatin 10 milliGRAM(s) Oral at bedtime  gabapentin 100 milliGRAM(s) Oral at bedtime  heparin   Injectable 5000 Unit(s) SubCutaneous every 12 hours  insulin glargine Injectable (LANTUS) 6 Unit(s) SubCutaneous at bedtime  insulin regular  human corrective regimen sliding scale   SubCutaneous every 6 hours  levothyroxine Injectable 37.5 MICROGram(s) IV Push <User Schedule>  meropenem  IVPB 1000 milliGRAM(s) IV Intermittent every 12 hours  pantoprazole  Injectable 80 milliGRAM(s) IV Push every 12 hours  rifAXIMin 550 milliGRAM(s) Oral two times a day  sodium bicarbonate 1300 milliGRAM(s) Oral three times a day  sodium chloride 0.9%. 1000 milliLiter(s) (80 mL/Hr) IV Continuous <Continuous>    MEDICATIONS  (PRN):  dextrose 40% Gel 15 Gram(s) Oral once PRN Blood Glucose LESS THAN 70 milliGRAM(s)/deciliter  glucagon  Injectable 1 milliGRAM(s) IntraMuscular once PRN Glucose LESS THAN 70 milligrams/deciliter  traZODone 50 milliGRAM(s) Oral at bedtime PRN Sleep      RADIOLOGY/OTHER STUDIES:  < from: Xray Chest 1 View- PORTABLE-Urgent (20 @ 20:59) >  IMPRESSION:  1.  Interval placement of a left-sided central line with its tip projecting over the SVC. No pneumothorax.  2.  Hazy opacities are seen with a mid/lower lung zone predominance, slightly progressed.   < end of copied text >

## 2020-04-20 DIAGNOSIS — N17.9 ACUTE KIDNEY FAILURE, UNSPECIFIED: ICD-10-CM

## 2020-04-20 LAB
ALBUMIN FLD-MCNC: 2 G/DL — SIGNIFICANT CHANGE UP
ALBUMIN SERPL ELPH-MCNC: 3.2 G/DL — LOW (ref 3.3–5)
ALP SERPL-CCNC: 95 U/L — SIGNIFICANT CHANGE UP (ref 40–120)
ALT FLD-CCNC: 8 U/L — LOW (ref 10–45)
ANION GAP SERPL CALC-SCNC: 12 MMOL/L — SIGNIFICANT CHANGE UP (ref 5–17)
APTT BLD: 34.2 SEC — SIGNIFICANT CHANGE UP (ref 27.5–36.3)
AST SERPL-CCNC: 12 U/L — SIGNIFICANT CHANGE UP (ref 10–40)
B PERT IGG+IGM PNL SER: SIGNIFICANT CHANGE UP
BASOPHILS # BLD AUTO: 0 K/UL — SIGNIFICANT CHANGE UP (ref 0–0.2)
BASOPHILS NFR BLD AUTO: 0 % — SIGNIFICANT CHANGE UP (ref 0–2)
BILIRUB SERPL-MCNC: 0.3 MG/DL — SIGNIFICANT CHANGE UP (ref 0.2–1.2)
BUN SERPL-MCNC: 31 MG/DL — HIGH (ref 7–23)
CALCIUM SERPL-MCNC: 8.2 MG/DL — LOW (ref 8.4–10.5)
CHLORIDE SERPL-SCNC: 102 MMOL/L — SIGNIFICANT CHANGE UP (ref 96–108)
CO2 SERPL-SCNC: 25 MMOL/L — SIGNIFICANT CHANGE UP (ref 22–31)
COLOR FLD: SIGNIFICANT CHANGE UP
CREAT SERPL-MCNC: 7.29 MG/DL — HIGH (ref 0.5–1.3)
CRP SERPL-MCNC: 38.14 MG/DL — HIGH (ref 0–0.4)
D DIMER BLD IA.RAPID-MCNC: 5258 NG/ML DDU — HIGH
EOSINOPHIL # BLD AUTO: 0.15 K/UL — SIGNIFICANT CHANGE UP (ref 0–0.5)
EOSINOPHIL NFR BLD AUTO: 0.9 % — SIGNIFICANT CHANGE UP (ref 0–6)
FERRITIN SERPL-MCNC: 672 NG/ML — HIGH (ref 30–400)
FLUID INTAKE SUBSTANCE CLASS: SIGNIFICANT CHANGE UP
FLUID SEGMENTED GRANULOCYTES: 66 % — SIGNIFICANT CHANGE UP
GLUCOSE BLDC GLUCOMTR-MCNC: 102 MG/DL — HIGH (ref 70–99)
GLUCOSE BLDC GLUCOMTR-MCNC: 148 MG/DL — HIGH (ref 70–99)
GLUCOSE BLDC GLUCOMTR-MCNC: 311 MG/DL — HIGH (ref 70–99)
GLUCOSE BLDC GLUCOMTR-MCNC: 325 MG/DL — HIGH (ref 70–99)
GLUCOSE BLDC GLUCOMTR-MCNC: 84 MG/DL — SIGNIFICANT CHANGE UP (ref 70–99)
GLUCOSE FLD-MCNC: 53 MG/DL — SIGNIFICANT CHANGE UP
GLUCOSE SERPL-MCNC: 79 MG/DL — SIGNIFICANT CHANGE UP (ref 70–99)
GRAM STN FLD: SIGNIFICANT CHANGE UP
HCT VFR BLD CALC: 20 % — CRITICAL LOW (ref 39–50)
HCT VFR BLD CALC: 22.6 % — LOW (ref 39–50)
HGB BLD-MCNC: 6.4 G/DL — CRITICAL LOW (ref 13–17)
HGB BLD-MCNC: 7.6 G/DL — LOW (ref 13–17)
INR BLD: 1.88 — HIGH (ref 0.88–1.16)
LDH SERPL L TO P-CCNC: 980 U/L — SIGNIFICANT CHANGE UP
LYMPHOCYTES # BLD AUTO: 0.59 K/UL — LOW (ref 1–3.3)
LYMPHOCYTES # BLD AUTO: 3.5 % — LOW (ref 13–44)
LYMPHOCYTES # FLD: 24 % — SIGNIFICANT CHANGE UP
MAGNESIUM SERPL-MCNC: 2 MG/DL — SIGNIFICANT CHANGE UP (ref 1.6–2.6)
MCHC RBC-ENTMCNC: 28.1 PG — SIGNIFICANT CHANGE UP (ref 27–34)
MCHC RBC-ENTMCNC: 29 PG — SIGNIFICANT CHANGE UP (ref 27–34)
MCHC RBC-ENTMCNC: 32 GM/DL — SIGNIFICANT CHANGE UP (ref 32–36)
MCHC RBC-ENTMCNC: 33.6 GM/DL — SIGNIFICANT CHANGE UP (ref 32–36)
MCV RBC AUTO: 86.3 FL — SIGNIFICANT CHANGE UP (ref 80–100)
MCV RBC AUTO: 87.7 FL — SIGNIFICANT CHANGE UP (ref 80–100)
MONOCYTES # BLD AUTO: 0.74 K/UL — SIGNIFICANT CHANGE UP (ref 0–0.9)
MONOCYTES NFR BLD AUTO: 4.4 % — SIGNIFICANT CHANGE UP (ref 2–14)
MONOS+MACROS # FLD: 10 % — SIGNIFICANT CHANGE UP
NEUTROPHILS # BLD AUTO: 15.32 K/UL — HIGH (ref 1.8–7.4)
NEUTROPHILS NFR BLD AUTO: 90.3 % — HIGH (ref 43–77)
NRBC # BLD: 0 /100 WBCS — SIGNIFICANT CHANGE UP (ref 0–0)
PHOSPHATE SERPL-MCNC: 6.6 MG/DL — HIGH (ref 2.5–4.5)
PLATELET # BLD AUTO: 407 K/UL — HIGH (ref 150–400)
PLATELET # BLD AUTO: 426 K/UL — HIGH (ref 150–400)
POTASSIUM SERPL-MCNC: 4.3 MMOL/L — SIGNIFICANT CHANGE UP (ref 3.5–5.3)
POTASSIUM SERPL-SCNC: 4.3 MMOL/L — SIGNIFICANT CHANGE UP (ref 3.5–5.3)
PROT FLD-MCNC: 4 G/DL — SIGNIFICANT CHANGE UP
PROT SERPL-MCNC: 6.5 G/DL — SIGNIFICANT CHANGE UP (ref 6–8.3)
PROTHROM AB SERPL-ACNC: 21.8 SEC — HIGH (ref 10–12.9)
RBC # BLD: 2.28 M/UL — LOW (ref 4.2–5.8)
RBC # BLD: 2.62 M/UL — LOW (ref 4.2–5.8)
RBC # FLD: 14.5 % — SIGNIFICANT CHANGE UP (ref 10.3–14.5)
RBC # FLD: 14.6 % — HIGH (ref 10.3–14.5)
RCV VOL RI: 3000 /UL — HIGH (ref 0–0)
SODIUM SERPL-SCNC: 139 MMOL/L — SIGNIFICANT CHANGE UP (ref 135–145)
SPECIMEN SOURCE FLD: SIGNIFICANT CHANGE UP
SPECIMEN SOURCE: SIGNIFICANT CHANGE UP
TOTAL NUCLEATED CELL COUNT, BODY FLUID: 346 /UL — SIGNIFICANT CHANGE UP
TUBE TYPE: SIGNIFICANT CHANGE UP
WBC # BLD: 16.8 K/UL — HIGH (ref 3.8–10.5)
WBC # BLD: 18.24 K/UL — HIGH (ref 3.8–10.5)
WBC # FLD AUTO: 16.8 K/UL — HIGH (ref 3.8–10.5)
WBC # FLD AUTO: 18.24 K/UL — HIGH (ref 3.8–10.5)

## 2020-04-20 PROCEDURE — 99233 SBSQ HOSP IP/OBS HIGH 50: CPT

## 2020-04-20 PROCEDURE — 49083 ABD PARACENTESIS W/IMAGING: CPT

## 2020-04-20 RX ORDER — SODIUM CHLORIDE 9 MG/ML
1000 INJECTION INTRAMUSCULAR; INTRAVENOUS; SUBCUTANEOUS
Refills: 0 | Status: DISCONTINUED | OUTPATIENT
Start: 2020-04-20 | End: 2020-04-21

## 2020-04-20 RX ORDER — GABAPENTIN 400 MG/1
100 CAPSULE ORAL ONCE
Refills: 0 | Status: COMPLETED | OUTPATIENT
Start: 2020-04-20 | End: 2020-04-20

## 2020-04-20 RX ORDER — ONDANSETRON 8 MG/1
4 TABLET, FILM COATED ORAL ONCE
Refills: 0 | Status: COMPLETED | OUTPATIENT
Start: 2020-04-20 | End: 2020-04-20

## 2020-04-20 RX ORDER — ALBUMIN HUMAN 25 %
100 VIAL (ML) INTRAVENOUS EVERY 8 HOURS
Refills: 0 | Status: DISCONTINUED | OUTPATIENT
Start: 2020-04-20 | End: 2020-04-21

## 2020-04-20 RX ORDER — ACETAMINOPHEN 500 MG
500 TABLET ORAL EVERY 6 HOURS
Refills: 0 | Status: DISCONTINUED | OUTPATIENT
Start: 2020-04-20 | End: 2020-04-21

## 2020-04-20 RX ADMIN — Medication 500 MILLIGRAM(S): at 21:19

## 2020-04-20 RX ADMIN — SODIUM CHLORIDE 50 MILLILITER(S): 9 INJECTION INTRAMUSCULAR; INTRAVENOUS; SUBCUTANEOUS at 23:38

## 2020-04-20 RX ADMIN — Medication 200 MILLIGRAM(S): at 15:01

## 2020-04-20 RX ADMIN — ONDANSETRON 4 MILLIGRAM(S): 8 TABLET, FILM COATED ORAL at 02:09

## 2020-04-20 RX ADMIN — MIDODRINE HYDROCHLORIDE 7.5 MILLIGRAM(S): 2.5 TABLET ORAL at 21:19

## 2020-04-20 RX ADMIN — INSULIN HUMAN 4: 100 INJECTION, SOLUTION SUBCUTANEOUS at 23:41

## 2020-04-20 RX ADMIN — OCTREOTIDE ACETATE 100 MICROGRAM(S): 200 INJECTION, SOLUTION INTRAVENOUS; SUBCUTANEOUS at 21:19

## 2020-04-20 RX ADMIN — OCTREOTIDE ACETATE 100 MICROGRAM(S): 200 INJECTION, SOLUTION INTRAVENOUS; SUBCUTANEOUS at 14:59

## 2020-04-20 RX ADMIN — MIDODRINE HYDROCHLORIDE 7.5 MILLIGRAM(S): 2.5 TABLET ORAL at 05:33

## 2020-04-20 RX ADMIN — OCTREOTIDE ACETATE 100 MICROGRAM(S): 200 INJECTION, SOLUTION INTRAVENOUS; SUBCUTANEOUS at 05:33

## 2020-04-20 RX ADMIN — HEPARIN SODIUM 7500 UNIT(S): 5000 INJECTION INTRAVENOUS; SUBCUTANEOUS at 17:48

## 2020-04-20 RX ADMIN — INSULIN GLARGINE 6 UNIT(S): 100 INJECTION, SOLUTION SUBCUTANEOUS at 23:38

## 2020-04-20 RX ADMIN — MIDODRINE HYDROCHLORIDE 7.5 MILLIGRAM(S): 2.5 TABLET ORAL at 14:58

## 2020-04-20 RX ADMIN — GABAPENTIN 100 MILLIGRAM(S): 400 CAPSULE ORAL at 21:19

## 2020-04-20 RX ADMIN — Medication 50 MILLILITER(S): at 21:19

## 2020-04-20 RX ADMIN — Medication 200 MILLIGRAM(S): at 05:32

## 2020-04-20 RX ADMIN — Medication 50 MILLILITER(S): at 05:32

## 2020-04-20 RX ADMIN — HEPARIN SODIUM 7500 UNIT(S): 5000 INJECTION INTRAVENOUS; SUBCUTANEOUS at 09:55

## 2020-04-20 RX ADMIN — HEPARIN SODIUM 7500 UNIT(S): 5000 INJECTION INTRAVENOUS; SUBCUTANEOUS at 00:12

## 2020-04-20 RX ADMIN — PANTOPRAZOLE SODIUM 80 MILLIGRAM(S): 20 TABLET, DELAYED RELEASE ORAL at 05:33

## 2020-04-20 RX ADMIN — PANTOPRAZOLE SODIUM 80 MILLIGRAM(S): 20 TABLET, DELAYED RELEASE ORAL at 17:47

## 2020-04-20 RX ADMIN — MEROPENEM 100 MILLIGRAM(S): 1 INJECTION INTRAVENOUS at 09:55

## 2020-04-20 RX ADMIN — Medication 37.5 MICROGRAM(S): at 06:19

## 2020-04-20 RX ADMIN — SODIUM CHLORIDE 100 MILLILITER(S): 9 INJECTION, SOLUTION INTRAVENOUS at 05:35

## 2020-04-20 RX ADMIN — SODIUM CHLORIDE 50 MILLILITER(S): 9 INJECTION INTRAMUSCULAR; INTRAVENOUS; SUBCUTANEOUS at 14:58

## 2020-04-20 NOTE — PROGRESS NOTE ADULT - ATTENDING COMMENTS
Pt was seen and examined. Agree with the above. No obvious evidence of GI bleeding ( no melena or blood in the ileostomy bag ).  Rec: capsule endoscopy tomorrow

## 2020-04-20 NOTE — PROGRESS NOTE ADULT - ATTENDING COMMENTS
A/P 60yMale with hx of DM presenting for management of pnuematosis sp subtotal colectomy cb COVID infection    1.  DM: type 2, controlled  lantus 6 units at bedtime  regular insulin every 6 hours sliding scale  FSG Goal 100-180    2.  Obesity - outpatient medical management.      3.  Hypothyriodism - continue levothyroxine 30mcg IV daily or 50mcg daily PO when able to tolerate    Will follow  Pt is advised to follow up with me at discharge by calling .      Evelyne Underwood MD, PhD  Endocrinology  [address] [place] [address] [id number]  [place] [address]  [phone] Tel  [phone] Fax  [email or web address]

## 2020-04-20 NOTE — PROGRESS NOTE ADULT - ASSESSMENT
61 y/o M with PMH of HTN, DM complicated by neuropathy, EtOH liver cirrhosis, presenting w/ pneumatosis of unknown etiology, s/p sub total colectomy and end ileostomy on 3/31, c/b fever and purulent wound infection (4/9), ascites now s/p paracentesis 4/13. Course further complicated by worsening MEG,  anemia, and COVID+      Decompensated cirrhosis- likely 2/2 alc use, ascites +, HE +, VH -  - MELD-Na of 28  - Ascites: s/p para on 4/10, 140 cc sent. Cytology negative, but not sent for cell count/culture. Patient has been on zosyn for 2 weeks so doubt SBP., repeat attempt made on 4/19 but unable to aspirate?   - HE: on Rifaximin.  - Varices: Unknown. H/h relatively stable without signs of active bleeding or melena in ostomy. Recommend EGD as outpatient to evaluate.  - HCC: no lesion seen on CT, recommend screening q6 months.    MEG: HRS versus ATN   -Creatine continues to rise. Renal us shows no hydro. Continues to rise despite Albumin.   - Appreciate Nephrology recommendations.      Nc Anemia: VSS   - Hemoglobin trended down today, but no active/overt bleeding    - plan for one unit prbc today, check post tx hb  - PPI OD  -monitor for melena/hematochezia/hematemesis 59 y/o M with PMH of HTN, DM complicated by neuropathy, EtOH liver cirrhosis, presenting w/ pneumatosis of unknown etiology, s/p sub total colectomy and end ileostomy on 3/31, c/b fever and purulent wound infection (4/9), ascites now s/p paracentesis 4/13. Course further complicated by worsening MEG,  anemia, and COVID+      Decompensated cirrhosis- likely 2/2 alc use, ascites +, HE +, VH -  - MELD-Na of 28  - Ascites: s/p para on 4/10, 140 cc sent. Cytology negative, but not sent for cell count/culture. Patient has been on zosyn for 2 weeks so doubt SBP., repeat attempt made on 4/19 but unable to aspirate?   - HE: on Rifaximin.  - Varices: Unknown. H/h relatively stable without signs of active bleeding or melena in ostomy. Recommend EGD as outpatient to evaluate.  - HCC: no lesion seen on CT, recommend screening q6 months.    MEG: HRS versus ATN   -Creatine continues to rise. Renal us shows no hydro. Continues to rise despite Albumin.   -on midodrine and octreotide  - Appreciate Nephrology recommendations.      Nc Anemia: VSS   - Hemoglobin trended down today, but no active/overt bleeding    - plan for one unit prbc today, check post tx hb  - PPI OD  -monitor for melena/hematochezia/hematemesis   -will plan for VCE tomorrow npo after midnight

## 2020-04-20 NOTE — PROGRESS NOTE ADULT - ASSESSMENT
59 y/o M with PMH of HTN, DM complicated by neuropathy, IVDU, ETOH liver cirrhosis, who presents to [facility] for fall at home. At [facility] patient reported that he takes lactulose at home daily and has daily BM, but he had no BM for 5 days prior to admission. 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 Patient underwent ex lap and subtotal colectomy, and ileostomy on 3/31.  COVID +

## 2020-04-20 NOTE — PROGRESS NOTE ADULT - SUBJECTIVE AND OBJECTIVE BOX
INTERVAL HPI/OVERNIGHT EVENTS:    OVERNIGHT: No overnight events.  SUBJECTIVE: Patient seen and examined at bedside. AM labs s/f hgb 6.4. GIving 1u PRBC      OBJECTIVE:    VITAL SIGNS:  ICU Vital Signs Last 24 Hrs  T(C): 37.2 (2020 08:33), Max: 39.1 (2020 13:38)  T(F): 98.9 (2020 08:33), Max: 102.3 (2020 13:38)  HR: 82 (2020 08:33) (77 - 108)  BP: 148/71 (2020 08:33) (127/65 - 162/76)  BP(mean): 102 (2020 08:33) (90 - 109)  ABP: --  ABP(mean): --  RR: 18 (2020 08:33) (15 - 26)  SpO2: 92% (2020 08:33) (92% - 98%)        04-19 @ 07:01  -  04-20 @ 07:00  --------------------------------------------------------  IN: 830 mL / OUT: 2150 mL / NET: -1320 mL      CAPILLARY BLOOD GLUCOSE      POCT Blood Glucose.: 84 mg/dL (2020 05:52)      PHYSICAL EXAM:  General: NAD, on roomair   HEENT: NCAT, PERRL, clear conjunctiva, no scleral icterus  Neck: supple, no JVD  Respiratory: CTA b/l, no wheezing, rhonchi, rales  Cardiovascular: RRR, normal S1S2, no M/R/G  Vascular: 2+ radial and DP pulses  Abdomen: wound vac in place over incision   Extremities: WWP, no clubbing, cyanosis, or edema  Skin: No rashes present  Neuro: A&Ox3    MEDICATIONS:  MEDICATIONS  (STANDING):  acetaminophen  IVPB .. 500 milliGRAM(s) IV Intermittent every 6 hours  albumin human 25% IVPB 100 milliLiter(s) IV Intermittent every 8 hours  dextrose 5% 1000 milliLiter(s) (100 mL/Hr) IV Continuous <Continuous>  dextrose 5%. 1000 milliLiter(s) (50 mL/Hr) IV Continuous <Continuous>  dextrose 50% Injectable 12.5 Gram(s) IV Push once  dextrose 50% Injectable 25 Gram(s) IV Push once  dextrose 50% Injectable 25 Gram(s) IV Push once  heparin   Injectable 7500 Unit(s) SubCutaneous every 8 hours  insulin glargine Injectable (LANTUS) 6 Unit(s) SubCutaneous at bedtime  insulin regular  human corrective regimen sliding scale   SubCutaneous every 6 hours  levothyroxine Injectable 37.5 MICROGram(s) IV Push <User Schedule>  meropenem  IVPB 500 milliGRAM(s) IV Intermittent every 24 hours  midodrine 7.5 milliGRAM(s) Oral every 8 hours  octreotide  Injectable 100 MICROGram(s) SubCutaneous every 8 hours  pantoprazole  Injectable 80 milliGRAM(s) IV Push every 12 hours  rifAXIMin 550 milliGRAM(s) Oral two times a day    MEDICATIONS  (PRN):  dextrose 40% Gel 15 Gram(s) Oral once PRN Blood Glucose LESS THAN 70 milliGRAM(s)/deciliter  glucagon  Injectable 1 milliGRAM(s) IntraMuscular once PRN Glucose LESS THAN 70 milligrams/deciliter      ALLERGIES:  Allergies    No Known Allergies    Intolerances        LABS:                        6.4    16.80 )-----------( 426      ( 2020 06:22 )             20.0     04-20    139  |  102  |  31<H>  ----------------------------<  79  4.3   |  25  |  7.29<H>    Ca    8.2<L>      2020 06:22  Phos  6.6     04-20  Mg     2.0     04-20    TPro  6.5  /  Alb  3.2<L>  /  TBili  0.3  /  DBili  x   /  AST  12  /  ALT  8<L>  /  AlkPhos  95  04-20    PT/INR - ( 2020 06:22 )   PT: 21.8 sec;   INR: 1.88          PTT - ( 2020 06:22 )  PTT:34.2 sec  Urinalysis Basic - ( 2020 14:29 )    Color: Yellow / Appearance: Clear / S.020 / pH: x  Gluc: x / Ketone: NEGATIVE  / Bili: Negative / Urobili: 0.2 E.U./dL   Blood: x / Protein: 30 mg/dL / Nitrite: NEGATIVE   Leuk Esterase: NEGATIVE / RBC: < 5 /HPF / WBC < 5 /HPF   Sq Epi: x / Non Sq Epi: 0-5 /HPF / Bacteria: Present /HPF        RADIOLOGY & ADDITIONAL TESTS: Reviewed.

## 2020-04-20 NOTE — PROGRESS NOTE ADULT - PROBLEM SELECTOR PLAN 1
patient with 2.1L fluid losses from NGT, ileostomy, and UO  -1.3L fluid balance  would re-start IVF (NS @ 50cc/hr) to try to offset fluid losses  no urgent need for hemodialytic intervention at this time

## 2020-04-20 NOTE — PROGRESS NOTE ADULT - ASSESSMENT
Hospital Course:  This is a 59 y/o M with PMH of HTN, DM complicated by neuropathy, IVDU, ETOH liver cirrhosis, who presents to [facility] for fall at home. At [facility] patient reported that he takes lactulose at home daily and has daily BM, but he had no BM for 5 days prior to admission. 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 Patient underwent ex lap and subtotal colectomy, and ileostomy on 3/31. Pt had hyperglycemia at this time and started on insulin gtt briefly. He initially tested negative for COVID 19, but subsequent test was positive, patient was transferred to tele COVID unit without need for supplemental oxygen.     Pt had issues with hypertension during admission and remains on amlodipine briefly requiring hydralazine as well.  Pt underwent paracentesis (w/o micro) on 4/13 with IR and results were negative. Consulted GI and nephro for worsening MEG to r/o hepatorenal syndrome and recommend albumin trial which was completed without improvement of MEG.On 4/15 renal consulted to w/u MEG and recommending no IVF due to low sodium. Concerned for metabolic acidosis and started on sodium bicarb TID. General surgery following patient and managing wound vac and NGT (keep to suction due to ileus). Pt transfused x1 unit pRBCs and AC was held 2/2 to bleeding. On 4/17 H/H drop but not requiring transfusion and should be trended daily. On 4/18 H/H remains stable. Lactulose started per GI (pt should have 2-3 BM per day). AC was resumed due to stable H/H. On 4/19 pt noted to be febrile to 101.7 with increasing WBC count and distended abdomen. Laculose was d/c and started on Rifaxin per Dr. Garcia. Ruling patient out for cdiff. Pending urgent CT abd/pelvis.     Plan:  1. Neurovascular: No delirium  -neuropathy: c/w Gabapentin   -Depression/mood: c/w Trazadone at night    2. Respiratory:   - (+) COVID  -No supplemental O2 required, 98% on RA  -COVID pneumonia:     -s/p x4 doses of HCQ and x3 doses of Azithro- stopped originally 2/2 to ?MEG  - on room air and breathing comfortably    3. Cardiovascular:   -restart amlodipine and atorvastatin once can tolerate PO meds   -Difficult IV access, central line left TLC placed on 4/18     4. GI:   -S/p laparotomy: wound vac in place (managed by general surgery)  -Ileostomy in place, functioning well   -Bowel regiment: changed from lactulose to rifaximin 550mg BID   -Diet: NPO (due to ileus and gastroparesis), NGT in place- to remain on suction - took off all PO med   -Prophylaxis: Pepcid  -GI following - s/p albumin trial, unclear if patient has hepatorenal syndrome, differ to nephro for diagnosis   -Child-Au Score: 7 (abd surgery 30% periop mortality risk)  - started albumin 25% TID   - started midodrine 7.5mgTID   - started octreotide 100 mg TID   -CT abdomen pelvis with persistent large ascites and peritonitis, bilateral pleural effusions and pericardial effusion   - will defer to IR for diagnostic paracentesis   - continues with meropenam 500 mg q 24for7 days (4/20 - )    5. /Renal:   -MEG this admission: worsening, renal following, recommending hydration with NS at maintenance rate (Dr. Heart)   -BUN/Cr: 32/7.29 (continues to increase daily)   -Trend Cr on AM labs  -Monitor UOP; continues to have good UOP  -Replete electrolytes as needed for goal K+ 4.0, Phos 3.0, Mg 2.0  +Condom catheter       6. ID:   -WBC: elevated but downtrending   -Continue with regular surveillance labs including CBC with diff, CMP, Mg, Phos, CRP, ABG, Ferritin, CK, Triglycerides, Procalcitonin, D-Dimer, EKG  -Additional labs q3d: ESR, T-cell subset, LDH, Ferritin, CK, Troponin, Coags  -COVID isolation protocol   -Febrile 102 on 04/19 and overnight with increasing WBC count: UA negative, pending blood cx results, started on meropenem (per ID Dr. Ulloa approved for 48 hours of 500mg BID)   -Per ID, will repeat diagnostic paracentesis with micro     7. Endo:  -A1C: 6.9  -DM: c/w with Lantus 6 and RISS  -TSH: 2.8  -Hypothyroid: c/w levothyroxine 50mcg     8. Heme:   -H/H: 7.5/21.7 (trend daily, transfuse for hgb <7) -- s/p x2 transfusions this admission  - on 04/20, had hgb of 6.4, now receiving 1 u pRBC to total 3 units on this admission   -Continue to monitor on daily and q3d labs as above.  -DVT ppx: on heparin 7500U Q12 units Q12. DVT negative by u/s.     Disposition: Full Code.

## 2020-04-20 NOTE — PROGRESS NOTE ADULT - SUBJECTIVE AND OBJECTIVE BOX
INTERVAL HPI/OVERNIGHT EVENTS:    Patient is a 60y old  Male who presents with a chief complaint of Colectomy (2020 10:46)  Pt s/p IR paracentesis today with removal of 250cc  remains NPO  insulin administration reviewed  temp of 100.7 noted  s/p pRBC transfusion  remains on abx.     Pt reports the following symptoms:    CONSTITUTIONAL:  Negative fever or chills, feels well, good appetite  EYES:  Negative  blurry vision or double vision  CARDIOVASCULAR:  Negative for chest pain or palpitations  RESPIRATORY:  Negative for cough, wheezing, or SOB   GASTROINTESTINAL:  Negative for nausea, vomiting, diarrhea, constipation, or abdominal pain  GENITOURINARY:  Negative frequency, urgency or dysuria  NEUROLOGIC:  No headache, confusion, dizziness, lightheadedness    MEDICATIONS  (STANDING):  acetaminophen  IVPB .. 500 milliGRAM(s) IV Intermittent every 6 hours  albumin human 25% IVPB 100 milliLiter(s) IV Intermittent every 8 hours  dextrose 5%. 1000 milliLiter(s) (50 mL/Hr) IV Continuous <Continuous>  dextrose 50% Injectable 12.5 Gram(s) IV Push once  dextrose 50% Injectable 25 Gram(s) IV Push once  dextrose 50% Injectable 25 Gram(s) IV Push once  heparin   Injectable 7500 Unit(s) SubCutaneous every 8 hours  insulin glargine Injectable (LANTUS) 6 Unit(s) SubCutaneous at bedtime  insulin regular  human corrective regimen sliding scale   SubCutaneous every 6 hours  levothyroxine Injectable 37.5 MICROGram(s) IV Push <User Schedule>  meropenem  IVPB 500 milliGRAM(s) IV Intermittent every 24 hours  midodrine 7.5 milliGRAM(s) Oral every 8 hours  octreotide  Injectable 100 MICROGram(s) SubCutaneous every 8 hours  pantoprazole  Injectable 80 milliGRAM(s) IV Push every 12 hours  rifAXIMin 550 milliGRAM(s) Oral two times a day  sodium chloride 0.9%. 1000 milliLiter(s) (50 mL/Hr) IV Continuous <Continuous>    MEDICATIONS  (PRN):  dextrose 40% Gel 15 Gram(s) Oral once PRN Blood Glucose LESS THAN 70 milliGRAM(s)/deciliter  glucagon  Injectable 1 milliGRAM(s) IntraMuscular once PRN Glucose LESS THAN 70 milligrams/deciliter      Past medical history reviewed  Family history reviewed  Social history reviewed    PHYSICAL EXAM  Vital Signs Last 24 Hrs  T(C): 37.8 (2020 18:04), Max: 38.2 (2020 14:36)  T(F): 100.1 (2020 18:04), Max: 100.7 (2020 14:36)  HR: 85 (2020 18:00) (77 - 96)  BP: 157/74 (2020 18:00) (127/65 - 162/83)  BP(mean): 106 (2020 18:00) (90 - 109)  RR: 19 (2020 18:00) (15 - 26)  SpO2: 93% (2020 18:00) (92% - 98%)    Due to the nature of this patient’s COVID-19 isolation status (either confirmed or suspected), this note was prepared without a bedside physical examination to prevent spread of infection and to conserve personal protective equipment during this nationwide pandemic. If possible, direct patient communication occurred via electronic measures or telephone conversation. Examination highlights were provided by a bedside nurse wearing personal protective equipment and review of pertinent medical records. Objective data (vital signs, urine output, lab results, imaging studies, medications, etc) were reviewed in detail. Face to face visits and physical examination have been limited only to patients for whom it is required for medical decision making.    LABS:                        7.6    18.24 )-----------( 407      ( 2020 16:52 )             22.6     04-20    139  |  102  |  31<H>  ----------------------------<  79  4.3   |  25  |  7.29<H>    Ca    8.2<L>      2020 06:22  Phos  6.6     04-20  Mg     2.0     04-20    TPro  6.5  /  Alb  3.2<L>  /  TBili  0.3  /  DBili  x   /  AST  12  /  ALT  8<L>  /  AlkPhos  95  04-20    PT/INR - ( 2020 06:22 )   PT: 21.8 sec;   INR: 1.88          PTT - ( 2020 06:22 )  PTT:34.2 sec  Urinalysis Basic - ( 2020 14:29 )    Color: Yellow / Appearance: Clear / S.020 / pH: x  Gluc: x / Ketone: NEGATIVE  / Bili: Negative / Urobili: 0.2 E.U./dL   Blood: x / Protein: 30 mg/dL / Nitrite: NEGATIVE   Leuk Esterase: NEGATIVE / RBC: < 5 /HPF / WBC < 5 /HPF   Sq Epi: x / Non Sq Epi: 0-5 /HPF / Bacteria: Present /HPF      Thyroid Stimulating Hormone, Serum: 2.813 uIU/mL ( @ 07:28)  Thyroid Stimulating Hormone, Serum: 2.877 uIU/mL ( @ 00:34)      HbA1C: 6.9 % ( @ 06:59)  7.6 % ( @ 05:58)    CAPILLARY BLOOD GLUCOSE      POCT Blood Glucose.: 148 mg/dL (2020 16:32)  POCT Blood Glucose.: 102 mg/dL (2020 12:00)  POCT Blood Glucose.: 84 mg/dL (2020 05:52)  POCT Blood Glucose.: 121 mg/dL (2020 23:52)  POCT Blood Glucose.: 126 mg/dL (2020 20:51)

## 2020-04-20 NOTE — PROGRESS NOTE ADULT - SUBJECTIVE AND OBJECTIVE BOX
CC: MULTIPLE MEDICAL COMPLAI      INTERVAL HISTORY:  chart and labs reviewed secondary to COVID+ status      ROS: No chest pain, no sob, no abd pain. No n/v/d    PAST MEDICAL & SURGICAL HISTORY:  Anemia  ETOH abuse  HLD (hyperlipidemia)  HTN (hypertension)  DM (diabetes mellitus)  No significant past surgical history      PHYSICAL EXAM:  T(C): 37.2 (20 @ 08:33), Max: 39.1 (20 @ 13:38)  HR: 82 (20 @ 08:33)  BP: 148/71 (20 @ 08:33) (127/65 - 162/76)  RR: 18 (20 @ 08:33)  SpO2: 92% (20 @ 08:33)  Wt(kg): --  I&O's Summary    2020 07:01  -  2020 07:00  --------------------------------------------------------  IN: 830 mL / OUT: 2150 mL / NET: -1320 mL      Weight   General: AAO x 3,  NAD.  HEENT: moist mucous membranes, no pallor/cyanosis.  Neck: no JVD visible.  Cardiac: S1, S2. RRR. No murmurs   Respratory: CTA b/l, no access muscle use.   Abdomen: soft. nontender. nondistended  Skin: no rashes.  Extremities: no LE edema b/l  Access:       DATA:                        6.4<LL>  16.80<H> )-----------( 426<H>    ( 2020 06:22 )             20.0<LL>    Ferritin, Serum: 672 ng/mL <H> ( @ 06:22)      139    |  102    |  31<H>  ----------------------------<  79     Ca:8.2<L> (2020 06:22)  4.3     |  25     |  7.29<H>      eGFR if Non : 7 <L>  eGFR if : 9 <L>    TPro  6.5    /  Alb  3.2<L>  /  TBili  0.3    /  DBili  x      /  AST  12     /  ALT  8<L>   /  AlkPhos  95     2020 06:22        Urinalysis Basic - ( 2020 14:29 )  Color: Yellow / Appearance: Clear / S.020 / pH: x  Gluc: x / Ketone: NEGATIVE  / Bili: Negative / Urobili: 0.2 E.U./dL   Blood: x / Protein: 30 mg/dL<!> / Nitrite: NEGATIVE   Leuk Esterase: NEGATIVE / RBC: < 5 /HPF / WBC < 5 /HPF   Sq Epi: x / Non Sq Epi: 0-5 /HPF / Bacteria: Present /HPF<!>      Osmolality, Random Urine: 270 mosmol/kg ( @ 14:29)  Sodium, Random Urine: 34 mmol/L ( @ 14:29)  Creatinine, Random Urine: 94 mg/dL ( @ 14:29)  Sodium, Random Urine: 28 mmol/L ( @ 22:38)  Creatinine, Random Urine: 61 mg/dL ( @ 22:38)  Chloride, Random Urine: <20 mmol/L ( @ 22:38)            MEDICATIONS  (STANDING):  acetaminophen  IVPB .. 500 milliGRAM(s) IV Intermittent every 6 hours  albumin human 25% IVPB 100 milliLiter(s) IV Intermittent every 8 hours  dextrose 5% 1000 milliLiter(s) (100 mL/Hr) IV Continuous <Continuous>  dextrose 5%. 1000 milliLiter(s) (50 mL/Hr) IV Continuous <Continuous>  dextrose 50% Injectable 12.5 Gram(s) IV Push once  dextrose 50% Injectable 25 Gram(s) IV Push once  dextrose 50% Injectable 25 Gram(s) IV Push once  heparin   Injectable 7500 Unit(s) SubCutaneous every 8 hours  insulin glargine Injectable (LANTUS) 6 Unit(s) SubCutaneous at bedtime  insulin regular  human corrective regimen sliding scale   SubCutaneous every 6 hours  levothyroxine Injectable 37.5 MICROGram(s) IV Push <User Schedule>  meropenem  IVPB 500 milliGRAM(s) IV Intermittent every 24 hours  midodrine 7.5 milliGRAM(s) Oral every 8 hours  octreotide  Injectable 100 MICROGram(s) SubCutaneous every 8 hours  pantoprazole  Injectable 80 milliGRAM(s) IV Push every 12 hours  rifAXIMin 550 milliGRAM(s) Oral two times a day    MEDICATIONS  (PRN):  dextrose 40% Gel 15 Gram(s) Oral once PRN Blood Glucose LESS THAN 70 milliGRAM(s)/deciliter  glucagon  Injectable 1 milliGRAM(s) IntraMuscular once PRN Glucose LESS THAN 70 milligrams/deciliter

## 2020-04-20 NOTE — PROGRESS NOTE ADULT - SUBJECTIVE AND OBJECTIVE BOX
PERTINENT REVIEW OF SYSTEMS:  per IM note    Allergies    No Known Allergies    Intolerances      MEDICATIONS:  MEDICATIONS  (STANDING):  acetaminophen  IVPB .. 500 milliGRAM(s) IV Intermittent every 6 hours  albumin human 25% IVPB 100 milliLiter(s) IV Intermittent every 8 hours  dextrose 5% 1000 milliLiter(s) (100 mL/Hr) IV Continuous <Continuous>  dextrose 5%. 1000 milliLiter(s) (50 mL/Hr) IV Continuous <Continuous>  dextrose 50% Injectable 12.5 Gram(s) IV Push once  dextrose 50% Injectable 25 Gram(s) IV Push once  dextrose 50% Injectable 25 Gram(s) IV Push once  heparin   Injectable 7500 Unit(s) SubCutaneous every 8 hours  insulin glargine Injectable (LANTUS) 6 Unit(s) SubCutaneous at bedtime  insulin regular  human corrective regimen sliding scale   SubCutaneous every 6 hours  levothyroxine Injectable 37.5 MICROGram(s) IV Push <User Schedule>  meropenem  IVPB 500 milliGRAM(s) IV Intermittent every 24 hours  midodrine 7.5 milliGRAM(s) Oral every 8 hours  octreotide  Injectable 100 MICROGram(s) SubCutaneous every 8 hours  pantoprazole  Injectable 80 milliGRAM(s) IV Push every 12 hours  rifAXIMin 550 milliGRAM(s) Oral two times a day    MEDICATIONS  (PRN):  dextrose 40% Gel 15 Gram(s) Oral once PRN Blood Glucose LESS THAN 70 milliGRAM(s)/deciliter  glucagon  Injectable 1 milliGRAM(s) IntraMuscular once PRN Glucose LESS THAN 70 milligrams/deciliter    Vital Signs Last 24 Hrs  T(C): 37.2 (2020 08:33), Max: 39.1 (2020 13:38)  T(F): 98.9 (2020 08:33), Max: 102.3 (2020 13:38)  HR: 82 (2020 08:33) (77 - 108)  BP: 148/71 (2020 08:33) (127/65 - 162/76)  BP(mean): 102 (2020 08:33) (90 - 109)  RR: 18 (2020 08:33) (15 - 26)  SpO2: 92% (2020 08:33) (92% - 98%)    - @ 07:01  -  - @ 07:00  --------------------------------------------------------  IN: 830 mL / OUT: 2150 mL / NET: -1320 mL        LABS:                        6.4    16.80 )-----------( 426      ( 2020 06:22 )             20.0     04-20    139  |  102  |  31<H>  ----------------------------<  79  4.3   |  25  |  7.29<H>    Ca    8.2<L>      2020 06:22  Phos  6.6     04-20  Mg     2.0     04-20    TPro  6.5  /  Alb  3.2<L>  /  TBili  0.3  /  DBili  x   /  AST  12  /  ALT  8<L>  /  AlkPhos  95  04-20    PT/INR - ( 2020 06:22 )   PT: 21.8 sec;   INR: 1.88          PTT - ( 2020 06:22 )  PTT:34.2 sec      Urinalysis Basic - ( 2020 14:29 )    Color: Yellow / Appearance: Clear / S.020 / pH: x  Gluc: x / Ketone: NEGATIVE  / Bili: Negative / Urobili: 0.2 E.U./dL   Blood: x / Protein: 30 mg/dL / Nitrite: NEGATIVE   Leuk Esterase: NEGATIVE / RBC: < 5 /HPF / WBC < 5 /HPF   Sq Epi: x / Non Sq Epi: 0-5 /HPF / Bacteria: Present /HPF                Culture - Blood (collected 2020 02:04)  Source: .Blood Blood  Preliminary Report (2020 03:00):    No growth at 1 day.      RADIOLOGY & ADDITIONAL STUDIES:

## 2020-04-21 LAB
ALBUMIN SERPL ELPH-MCNC: 3.4 G/DL — SIGNIFICANT CHANGE UP (ref 3.3–5)
ALP SERPL-CCNC: 92 U/L — SIGNIFICANT CHANGE UP (ref 40–120)
ALT FLD-CCNC: 6 U/L — LOW (ref 10–45)
ANION GAP SERPL CALC-SCNC: 16 MMOL/L — SIGNIFICANT CHANGE UP (ref 5–17)
AST SERPL-CCNC: 13 U/L — SIGNIFICANT CHANGE UP (ref 10–40)
BASOPHILS # BLD AUTO: 0.03 K/UL — SIGNIFICANT CHANGE UP (ref 0–0.2)
BASOPHILS NFR BLD AUTO: 0.2 % — SIGNIFICANT CHANGE UP (ref 0–2)
BILIRUB SERPL-MCNC: 0.4 MG/DL — SIGNIFICANT CHANGE UP (ref 0.2–1.2)
BUN SERPL-MCNC: 36 MG/DL — HIGH (ref 7–23)
CALCIUM SERPL-MCNC: 8.1 MG/DL — LOW (ref 8.4–10.5)
CHLORIDE SERPL-SCNC: 99 MMOL/L — SIGNIFICANT CHANGE UP (ref 96–108)
CO2 SERPL-SCNC: 24 MMOL/L — SIGNIFICANT CHANGE UP (ref 22–31)
CREAT SERPL-MCNC: 6.92 MG/DL — HIGH (ref 0.5–1.3)
CRP SERPL-MCNC: 28.15 MG/DL — HIGH (ref 0–0.4)
D DIMER BLD IA.RAPID-MCNC: 3285 NG/ML DDU — HIGH
EOSINOPHIL # BLD AUTO: 0.01 K/UL — SIGNIFICANT CHANGE UP (ref 0–0.5)
EOSINOPHIL NFR BLD AUTO: 0.1 % — SIGNIFICANT CHANGE UP (ref 0–6)
FERRITIN SERPL-MCNC: 753 NG/ML — HIGH (ref 30–400)
GLUCOSE BLDC GLUCOMTR-MCNC: 160 MG/DL — HIGH (ref 70–99)
GLUCOSE BLDC GLUCOMTR-MCNC: 168 MG/DL — HIGH (ref 70–99)
GLUCOSE BLDC GLUCOMTR-MCNC: 212 MG/DL — HIGH (ref 70–99)
GLUCOSE SERPL-MCNC: 149 MG/DL — HIGH (ref 70–99)
HCT VFR BLD CALC: 22.8 % — LOW (ref 39–50)
HGB BLD-MCNC: 7.4 G/DL — LOW (ref 13–17)
IMM GRANULOCYTES NFR BLD AUTO: 1.9 % — HIGH (ref 0–1.5)
LYMPHOCYTES # BLD AUTO: 0.9 K/UL — LOW (ref 1–3.3)
LYMPHOCYTES # BLD AUTO: 5.5 % — LOW (ref 13–44)
MAGNESIUM SERPL-MCNC: 2 MG/DL — SIGNIFICANT CHANGE UP (ref 1.6–2.6)
MCHC RBC-ENTMCNC: 28.8 PG — SIGNIFICANT CHANGE UP (ref 27–34)
MCHC RBC-ENTMCNC: 32.5 GM/DL — SIGNIFICANT CHANGE UP (ref 32–36)
MCV RBC AUTO: 88.7 FL — SIGNIFICANT CHANGE UP (ref 80–100)
MONOCYTES # BLD AUTO: 1.27 K/UL — HIGH (ref 0–0.9)
MONOCYTES NFR BLD AUTO: 7.7 % — SIGNIFICANT CHANGE UP (ref 2–14)
NEUTROPHILS # BLD AUTO: 13.93 K/UL — HIGH (ref 1.8–7.4)
NEUTROPHILS NFR BLD AUTO: 84.6 % — HIGH (ref 43–77)
NRBC # BLD: 0 /100 WBCS — SIGNIFICANT CHANGE UP (ref 0–0)
PHOSPHATE SERPL-MCNC: 5.6 MG/DL — HIGH (ref 2.5–4.5)
PLATELET # BLD AUTO: 444 K/UL — HIGH (ref 150–400)
POTASSIUM SERPL-MCNC: 4.3 MMOL/L — SIGNIFICANT CHANGE UP (ref 3.5–5.3)
POTASSIUM SERPL-SCNC: 4.3 MMOL/L — SIGNIFICANT CHANGE UP (ref 3.5–5.3)
PROT SERPL-MCNC: 6.7 G/DL — SIGNIFICANT CHANGE UP (ref 6–8.3)
RBC # BLD: 2.57 M/UL — LOW (ref 4.2–5.8)
RBC # FLD: 14.6 % — HIGH (ref 10.3–14.5)
SODIUM SERPL-SCNC: 139 MMOL/L — SIGNIFICANT CHANGE UP (ref 135–145)
WBC # BLD: 16.46 K/UL — HIGH (ref 3.8–10.5)
WBC # FLD AUTO: 16.46 K/UL — HIGH (ref 3.8–10.5)

## 2020-04-21 PROCEDURE — 99232 SBSQ HOSP IP/OBS MODERATE 35: CPT

## 2020-04-21 PROCEDURE — 99233 SBSQ HOSP IP/OBS HIGH 50: CPT

## 2020-04-21 PROCEDURE — 71045 X-RAY EXAM CHEST 1 VIEW: CPT | Mod: 26

## 2020-04-21 RX ORDER — ACETAMINOPHEN 500 MG
500 TABLET ORAL EVERY 6 HOURS
Refills: 0 | Status: DISCONTINUED | OUTPATIENT
Start: 2020-04-21 | End: 2020-04-22

## 2020-04-21 RX ORDER — LEVOTHYROXINE SODIUM 125 MCG
30 TABLET ORAL
Refills: 0 | Status: DISCONTINUED | OUTPATIENT
Start: 2020-04-21 | End: 2020-04-22

## 2020-04-21 RX ADMIN — HEPARIN SODIUM 7500 UNIT(S): 5000 INJECTION INTRAVENOUS; SUBCUTANEOUS at 09:05

## 2020-04-21 RX ADMIN — PANTOPRAZOLE SODIUM 80 MILLIGRAM(S): 20 TABLET, DELAYED RELEASE ORAL at 05:32

## 2020-04-21 RX ADMIN — Medication 50 MILLILITER(S): at 13:45

## 2020-04-21 RX ADMIN — PANTOPRAZOLE SODIUM 80 MILLIGRAM(S): 20 TABLET, DELAYED RELEASE ORAL at 17:15

## 2020-04-21 RX ADMIN — MIDODRINE HYDROCHLORIDE 7.5 MILLIGRAM(S): 2.5 TABLET ORAL at 05:32

## 2020-04-21 RX ADMIN — MEROPENEM 100 MILLIGRAM(S): 1 INJECTION INTRAVENOUS at 09:05

## 2020-04-21 RX ADMIN — INSULIN HUMAN 2: 100 INJECTION, SOLUTION SUBCUTANEOUS at 17:15

## 2020-04-21 RX ADMIN — OCTREOTIDE ACETATE 100 MICROGRAM(S): 200 INJECTION, SOLUTION INTRAVENOUS; SUBCUTANEOUS at 05:34

## 2020-04-21 RX ADMIN — HEPARIN SODIUM 7500 UNIT(S): 5000 INJECTION INTRAVENOUS; SUBCUTANEOUS at 14:00

## 2020-04-21 RX ADMIN — Medication 500 MILLIGRAM(S): at 22:43

## 2020-04-21 RX ADMIN — HEPARIN SODIUM 7500 UNIT(S): 5000 INJECTION INTRAVENOUS; SUBCUTANEOUS at 00:27

## 2020-04-21 RX ADMIN — Medication 500 MILLIGRAM(S): at 05:32

## 2020-04-21 RX ADMIN — OCTREOTIDE ACETATE 100 MICROGRAM(S): 200 INJECTION, SOLUTION INTRAVENOUS; SUBCUTANEOUS at 13:42

## 2020-04-21 RX ADMIN — MIDODRINE HYDROCHLORIDE 7.5 MILLIGRAM(S): 2.5 TABLET ORAL at 13:44

## 2020-04-21 RX ADMIN — Medication 30 MICROGRAM(S): at 05:30

## 2020-04-21 RX ADMIN — Medication 50 MILLILITER(S): at 05:30

## 2020-04-21 RX ADMIN — INSULIN GLARGINE 6 UNIT(S): 100 INJECTION, SOLUTION SUBCUTANEOUS at 22:43

## 2020-04-21 RX ADMIN — HEPARIN SODIUM 7500 UNIT(S): 5000 INJECTION INTRAVENOUS; SUBCUTANEOUS at 22:44

## 2020-04-21 RX ADMIN — INSULIN HUMAN 1: 100 INJECTION, SOLUTION SUBCUTANEOUS at 06:31

## 2020-04-21 RX ADMIN — Medication 500 MILLIGRAM(S): at 10:59

## 2020-04-21 NOTE — PROGRESS NOTE ADULT - ASSESSMENT
61 y/o M with PMH of HTN, DM complicated by neuropathy, EtOH liver cirrhosis, presenting w/ pneumatosis of unknown etiology, s/p sub total colectomy and end ileostomy on 3/31, c/b fever and purulent wound infection (4/9), ascites now s/p paracentesis 4/13. Course further complicated by worsening MEG,  anemia, and COVID+     # Decompensated cirrhosis- likely 2/2 alc use, ascites +, HE +, VH -  - MELD-Na of 28  - Ascites: s/p para on 4/10, 140 cc sent. Cytology negative, but not sent for cell count/culture. Patient has been on zosyn for 2 weeks so doubt SBP., repeat attempt made on 4/19 but unable to aspirate?   - HE: on Rifaximin.  - Varices: Unknown. H/h relatively stable without signs of active bleeding or melena in ostomy. Recommend EGD as outpatient to evaluate.  - HCC: no lesion seen on CT, recommend screening q6 months.    # MEG: HRS versus ATN   -Creatine continues to rise. Renal us shows no hydro. Continues to rise despite Albumin.   -on midodrine and octreotide  - Appreciate Nephrology recommendations.      # Anemia: VSS   - Hemoglobin relatively stable  - no evidence of active/overt GI bleeding - hematemesis, coffee grounds, melena, BRB from ileostomy  - PPI OD  - monitor for melena/hematochezia/hematemesis   - initially planned for a VCE - but patient refused procedure      Discussed with attending Dr Frankel

## 2020-04-21 NOTE — PROGRESS NOTE ADULT - SUBJECTIVE AND OBJECTIVE BOX
REVIEW OF SYSTEMS:  Per IM Progress note ROS  Spoke to patient over the phone      MEDICATIONS:  MEDICATIONS  (STANDING):  albumin human 25% IVPB 100 milliLiter(s) IV Intermittent every 8 hours  dextrose 5%. 1000 milliLiter(s) (50 mL/Hr) IV Continuous <Continuous>  dextrose 50% Injectable 12.5 Gram(s) IV Push once  dextrose 50% Injectable 25 Gram(s) IV Push once  dextrose 50% Injectable 25 Gram(s) IV Push once  heparin   Injectable 7500 Unit(s) SubCutaneous every 8 hours  insulin glargine Injectable (LANTUS) 6 Unit(s) SubCutaneous at bedtime  insulin regular  human corrective regimen sliding scale   SubCutaneous every 6 hours  levothyroxine Injectable 30 MICROGram(s) IV Push <User Schedule>  meropenem  IVPB 500 milliGRAM(s) IV Intermittent every 24 hours  midodrine 7.5 milliGRAM(s) Oral every 8 hours  octreotide  Injectable 100 MICROGram(s) SubCutaneous every 8 hours  pantoprazole  Injectable 80 milliGRAM(s) IV Push every 12 hours  rifAXIMin 550 milliGRAM(s) Oral two times a day    MEDICATIONS  (PRN):  acetaminophen   Tablet .. 500 milliGRAM(s) Oral every 6 hours PRN Temp greater or equal to 38C (100.4F)  dextrose 40% Gel 15 Gram(s) Oral once PRN Blood Glucose LESS THAN 70 milliGRAM(s)/deciliter  glucagon  Injectable 1 milliGRAM(s) IntraMuscular once PRN Glucose LESS THAN 70 milligrams/deciliter      Allergies  No Known Allergies    Intolerances        Vital Signs Last 24 Hrs  T(C): 37.2 (2020 10:11), Max: 38.2 (2020 14:36)  T(F): 98.9 (2020 10:11), Max: 100.7 (2020 14:36)  HR: 86 (2020 08:45) (77 - 96)  BP: 146/68 (2020 08:45) (146/68 - 162/83)  BP(mean): 98 (2020 08:45) (98 - 108)  RR: 20 (2020 08:45) (17 - 23)  SpO2: 95% (2020 08:45) (92% - 98%)    - @ 07:01  -  - @ 07:00  --------------------------------------------------------  IN: 300 mL / OUT: 1500 mL / NET: -1200 mL        PHYSICAL EXAM:  SEE BELOW      LABS:  CBC Full  -  ( 2020 06:55 )  WBC Count : 16.46 K/uL  RBC Count : 2.57 M/uL  Hemoglobin : 7.4 g/dL  Hematocrit : 22.8 %  Platelet Count - Automated : 444 K/uL  Mean Cell Volume : 88.7 fl  Mean Cell Hemoglobin : 28.8 pg  Mean Cell Hemoglobin Concentration : 32.5 gm/dL  Auto Neutrophil # : 13.93 K/uL  Auto Lymphocyte # : 0.90 K/uL  Auto Monocyte # : 1.27 K/uL  Auto Eosinophil # : 0.01 K/uL  Auto Basophil # : 0.03 K/uL  Auto Neutrophil % : 84.6 %  Auto Lymphocyte % : 5.5 %  Auto Monocyte % : 7.7 %  Auto Eosinophil % : 0.1 %  Auto Basophil % : 0.2 %        139  |  99  |  36<H>  ----------------------------<  149<H>  4.3   |  24  |  6.92<H>    Ca    8.1<L>      2020 06:55  Phos  5.6     -  Mg     2.0         TPro  6.7  /  Alb  3.4  /  TBili  0.4  /  DBili  x   /  AST  13  /  ALT  6<L>  /  AlkPhos  92  04-21    PT/INR - ( 2020 06:22 )   PT: 21.8 sec;   INR: 1.88        PTT - ( 2020 06:22 )  PTT:34.2 sec    Urinalysis Basic - ( 2020 14:29 )  Color: Yellow / Appearance: Clear / S.020 / pH: x  Gluc: x / Ketone: NEGATIVE  / Bili: Negative / Urobili: 0.2 E.U./dL   Blood: x / Protein: 30 mg/dL / Nitrite: NEGATIVE   Leuk Esterase: NEGATIVE / RBC: < 5 /HPF / WBC < 5 /HPF   Sq Epi: x / Non Sq Epi: 0-5 /HPF / Bacteria: Present /HPF          RADIOLOGY & ADDITIONAL STUDIES (The following images were personally reviewed):

## 2020-04-21 NOTE — ADVANCED PRACTICE NURSE CONSULT - ASSESSMENT
1 3/4" Angi 2 piece appliance intact, dark brown liquid effluent in appliance. Educated patient on how to empty appliance by demonstrating with an empty pouch, then pt  performed emptying appliance with verbal prompting. Also reviewed frequency of emptying pouch.

## 2020-04-21 NOTE — PROGRESS NOTE ADULT - ASSESSMENT
59 y/o M with PMH of HTN, DM complicated by neuropathy, EtOH liver cirrhosis, presenting w/ pneumatosis of unknown etiology, s/p sub total colectomy and end ileostomy on 3/31, c/b fever and purulent wound infection (4/9) now s/p paracentesis 4/13    Neuro: Pain/Nausea control  CV: HDS but hypertensive, lisinopril 40 mg and amlodipine 10mg, Labetalol 20 mg PRN BP >170, hydralazine 10 mg prn  Pulm: CED RAMIREZ: NPO for capsule study today  : Voids, urinating in bed- inaccurate I/Os  ID: Zosyn (4/9-), vanc (4/9-4/13), azithro 4/13-4/16, plaquenil 4/13-4/16  Endo: ISS, lantus 6, synthroid  PPx: SCDs, SQH, OOBA  PT/OT: initial evaluation 4/3  Wounds: Vac in place, change today

## 2020-04-21 NOTE — PROGRESS NOTE ADULT - ATTENDING COMMENTS
See the Resident note written above, for details. I reviewed the resident documentation. I have personally seen and examined this critically ill patient. I have reviewed vitals, labs, medications, and additional imaging. I agree with the resident's findings and plans as written above with the following additions/amendments:  Patient with acute hypoxemic respiratory failure due to COVID 19 currently stable saturating >96% on RA (patient has taken off supplementary oxygen via nasal cannula)  NC 2L placed back on patient for safety during transfer to [facility] medicine  DC octerotide/midodrine/albumin per GI as low suspicion for HRS  Patient declining capsule study  ID has weighed in and given no clear source of infection rec dc meropenem  GI protection with ppi  Anticoagulation with heparin SC and ppx with IPC  Patient stable for transfer to [facility]  SHIMA Shultz.  [facility] MICU Attending

## 2020-04-21 NOTE — PROGRESS NOTE ADULT - ASSESSMENT
61 y/o M with PMH of HTN, DM complicated by neuropathy, IVDU, ETOH liver cirrhosis, who presents to [facility] for fall at home; found to have toxic megacolon and pneumatosis; underwent ex lap and subtotal colectomy, and ileostomy on 3/31. Developed COVID 19 while in the hospital, but subsequent test was positive, patient was transferred to tele COVID unit without need for supplemental oxygen. Now on 2L NC. Developed worsening MEG with low suspicion for hepatorenal syndrome. Has general surgery managing would vac.       Plan:  1. Neurovascular: No delirium  -neuropathy: no gabapentin at this time   -Depression/mood: home med trazodone; holding for now    Respiratory:   (+) COVID.   -No supplemental O2 required, 98% on RA until yesterday when started needed 2L NC   -s/p x4 doses of HCQ and x3 doses of Azithro- stopped originally 2/2 to ?MEG    Cardiovascular:  #HTN   -restart amlodipine and atorvastatin once can tolerate PO meds   -Difficult IV access, central line left TLC placed on 4/18     GI  #Toxic Megacolon s/p ex lap and subtotal colectomy, and ileostomy on 3/31    -S/p laparotomy: wound vac in place (managed by general surgery)  -Ileostomy in place, functioning well   -Bowel regiment: changed from lactulose to rifaximin 550mg BID   -Diet: Clears, consistent carb     #Prophylaxis  - pepcid     #Cirrhosis.   -GI following - s/p albumin trial, unclear if patient has hepatorenal syndrome, differ to nephro for diagnosis   -Child-Au Score: 7 (abd surgery 30% periop mortality risk)  - started albumin 25% TID   - started midodrine 7.5mgTID   - started octreotide 100 mg TID   -CT abdomen pelvis (4/20) with persistent large ascites and peritonitis, bilateral pleural effusions and pericardial effusion   - will defer to IR for diagnostic paracentesis   - s/p with meropenam 500 mg q 24for 2 days  - ID consulted on 04/21, no need for abx at this time   - paracentesis from 04/20 negative for SBP     MEG. worsening since admission, but mild improvement today with cre 6.92   - renal following  -Trend Cr on AM labs  -Monitor UOP; continues to have good UOP  -Replete electrolytes as needed for goal K+ 4.0, Phos 3.0, Mg 2.0  -started albumin 25% TID      6. ID:   #Covid positive pneumonia  -WBC: elevated but downtrending   -Continue with regular surveillance labs including CBC with diff, CMP, Mg, Phos, CRP, ABG, Ferritin, CK, Triglycerides, Procalcitonin, D-Dimer, EKG  -COVID isolation protocol   -Febrile 102 on 04/19 and overnight with increasing WBC count: UA negative, pending blood cx results, started on meropenem (per ID Dr. Ulloa approved for 48 hours of 500mg BID) but now stopped.   -Per ID, no need for further abx at this time     7. Endo:    #Diabetes. A1C: 6.9  -DM:   - c/w with Lantus 6   - c/w ISS    #Hypothyroid  - c/w levothyroxine 50mcg     8. Heme:     Anemia.   - H/H: 7.4  (trend daily, transfuse for hgb <7)   - on 04/20, had hgb of 6.4, now receiving 1 u pRBC to total 3 units on this admission     #DVT ppx: on heparin 7500U Q12 units Q12. DVT negative by u/s.     Disposition: Full Code.

## 2020-04-21 NOTE — PROGRESS NOTE ADULT - SUBJECTIVE AND OBJECTIVE BOX
SUBJECTIVE: Very depressed, multiple loose BMs, ambulating. Patient seen and examined bedside by chief resident.    heparin   Injectable 7500 Unit(s) SubCutaneous every 8 hours  meropenem  IVPB 500 milliGRAM(s) IV Intermittent every 24 hours  midodrine 7.5 milliGRAM(s) Oral every 8 hours  rifAXIMin 550 milliGRAM(s) Oral two times a day    MEDICATIONS  (PRN):  acetaminophen   Tablet .. 500 milliGRAM(s) Oral every 6 hours PRN Temp greater or equal to 38C (100.4F)  dextrose 40% Gel 15 Gram(s) Oral once PRN Blood Glucose LESS THAN 70 milliGRAM(s)/deciliter  glucagon  Injectable 1 milliGRAM(s) IntraMuscular once PRN Glucose LESS THAN 70 milligrams/deciliter      I&O's Detail    2020 07:01  -  2020 07:00  --------------------------------------------------------  IN:    sodium chloride 0.9%: 300 mL  Total IN: 300 mL    OUT:    Ileostomy: 650 mL    Voided: 850 mL  Total OUT: 1500 mL    Total NET: -1200 mL          Vital Signs Last 24 Hrs  T(C): 38 (2020 05:14), Max: 38.2 (2020 14:36)  T(F): 100.4 (2020 05:14), Max: 100.7 (2020 14:36)  HR: 82 (2020 05:14) (77 - 96)  BP: 155/73 (2020 05:14) (148/71 - 162/83)  BP(mean): 105 (2020 05:14) (99 - 108)  RR: 21 (2020 05:14) (17 - 23)  SpO2: 92% (2020 05:14) (92% - 98%)    General: NAD, resting comfortably in bed  C/V: NSR  Pulm: Nonlabored breathing, no respiratory distress  Abd: soft, NT/ND, midline wound w/ vac in place   Extrem: SCDs in place    LABS:                        7.4    16.46 )-----------( 444      ( 2020 06:55 )             22.8     04-    139  |  99  |  36<H>  ----------------------------<  149<H>  4.3   |  24  |  6.92<H>    Ca    8.1<L>      2020 06:55  Phos  5.6     -  Mg     2.0         TPro  6.7  /  Alb  3.4  /  TBili  0.4  /  DBili  x   /  AST  13  /  ALT  6<L>  /  AlkPhos  92  -21    PT/INR - ( 2020 06:22 )   PT: 21.8 sec;   INR: 1.88          PTT - ( 2020 06:22 )  PTT:34.2 sec  Urinalysis Basic - ( 2020 14:29 )    Color: Yellow / Appearance: Clear / S.020 / pH: x  Gluc: x / Ketone: NEGATIVE  / Bili: Negative / Urobili: 0.2 E.U./dL   Blood: x / Protein: 30 mg/dL / Nitrite: NEGATIVE   Leuk Esterase: NEGATIVE / RBC: < 5 /HPF / WBC < 5 /HPF   Sq Epi: x / Non Sq Epi: 0-5 /HPF / Bacteria: Present /HPF        RADIOLOGY & ADDITIONAL STUDIES:  CT Abdomen and Pelvis No Cont:   EXAM:  CT ABDOMEN AND PELVIS                          PROCEDURE DATE:  2020          INTERPRETATION:  CLINICAL INDICATION: 60-year-old with increasing white blood cell count and increasing abdominal distention.          FINDINGS: CT of the abdomen and pelvis was performed without administration of intravenous contrast. Reconstructions were performed in the sagittal and coronal planes. Comparison is made to prior studies dated 4/10/2020.    Evaluation of the lower chest demonstrates no interval change in moderate bilateral pleural effusions. No interval change in small pericardial effusion. Evaluation of the abdomen demonstrates cholelithiasis. The spleen, bilateral adrenal glands and bilateral kidneys are unremarkable. Cholelithiasis.Evaluation of the gastrointestinal tract demonstrates intact Beck's pouch and subtotal colectomy. Right lower quadrant ileostomy. Increasing large volume of free fluid within the abdomen. Thickening of the peritoneal surface. Persistent clustered nodules along the peritoneal reflection in the right perirenal region the larger measuring 3.4 mm, possibly retracting blood clot. Evaluation of the pelvis demonstrate the bladder is thick-walled and also not well distended. Prostate and seminal vesicles are unremarkable. Enteric tube tip within the stomach. No evidence of bowel obstruction. Anterior abdominal incision. Mild anasarca. Presacral edema. Mesenteric edema. No adenopathy. No significant arterial vascular calcification. Evaluation of the osseous structures are unremarkable.          IMPRESSION:    Persistent large ascites and peritonitis.    Persistent bilateral pleural effusions and pericardial effusion.    Subtotal colectomy with right lower quadrant ileostomy.                    Thank you for the opportunity to participate in the care of this patient.        TARA AMBRIZ M.D., ATTENDING RADIOLOGIST  This document has been electronically signed. 2020  5:55PM               (20 @ 16:34)      Culture - Body Fluid with Gram Stain (collected 20 @ 14:57)  Source: .Body Fluid paracentesis ascities fluid culture  Gram Stain (20 @ 18:30):    No organisms seen    Rare WBC's

## 2020-04-21 NOTE — PROGRESS NOTE ADULT - SUBJECTIVE AND OBJECTIVE BOX
Patient seen and examined at bedside as per COVID protocol.  Meds, vitals, labs, imaging reviewed.        Medications:    acetaminophen   Tablet .. 500 milliGRAM(s) every 6 hours PRN  albumin human 25% IVPB 100 milliLiter(s) every 8 hours  dextrose 40% Gel 15 Gram(s) once PRN  dextrose 5%. 1000 milliLiter(s) <Continuous>  dextrose 50% Injectable 12.5 Gram(s) once  dextrose 50% Injectable 25 Gram(s) once  dextrose 50% Injectable 25 Gram(s) once  glucagon  Injectable 1 milliGRAM(s) once PRN  heparin   Injectable 7500 Unit(s) every 8 hours  insulin glargine Injectable (LANTUS) 6 Unit(s) at bedtime  insulin regular  human corrective regimen sliding scale   every 6 hours  levothyroxine Injectable 30 MICROGram(s) <User Schedule>  midodrine 7.5 milliGRAM(s) every 8 hours  octreotide  Injectable 100 MICROGram(s) every 8 hours  pantoprazole  Injectable 80 milliGRAM(s) every 12 hours  rifAXIMin 550 milliGRAM(s) two times a day      Allergies    No Known Allergies    Intolerances        T(C): , Max: 38 (04-21-20 @ 05:14)  T(F): , Max: 100.4 (04-21-20 @ 05:14)  HR: 78 (04-21-20 @ 13:53)  BP: 169/73 (04-21-20 @ 13:53)  BP(mean): 105 (04-21-20 @ 13:53)  RR: 20 (04-21-20 @ 13:53)  SpO2: 97% (04-21-20 @ 13:53)  Wt(kg): --    04-20 @ 07:01  -  04-21 @ 07:00  --------------------------------------------------------  IN:    sodium chloride 0.9%: 300 mL  Total IN: 300 mL    OUT:    Ileostomy: 650 mL    Voided: 850 mL  Total OUT: 1500 mL    Total NET: -1200 mL      04-21 @ 07:01  -  04-21 @ 16:00  --------------------------------------------------------  IN:    Oral Fluid: 237 mL  Total IN: 237 mL    OUT:    Voided: 250 mL  Total OUT: 250 mL    Total NET: -13 mL            ROS: Unable due to limited contact    Physical examine as per primary team due to COVID protocol  Saturating AT 97% on 2 L NC      LABS:                        7.4    16.46 )-----------( 444      ( 21 Apr 2020 06:55 )             22.8     04-21    139  |  99  |  36<H>  ----------------------------<  149<H>  4.3   |  24  |  6.92<H>    Ca    8.1<L>      21 Apr 2020 06:55  Phos  5.6     04-21  Mg     2.0     04-21    TPro  6.7  /  Alb  3.4  /  TBili  0.4  /  DBili  x   /  AST  13  /  ALT  6<L>  /  AlkPhos  92  04-21      PT/INR - ( 20 Apr 2020 06:22 )   PT: 21.8 sec;   INR: 1.88          PTT - ( 20 Apr 2020 06:22 )  PTT:34.2 sec          RADIOLOGY & ADDITIONAL STUDIES: Patient seen and examined at bedside as per COVID protocol.  Meds, vitals, labs, imaging reviewed.        Medications:    acetaminophen   Tablet .. 500 milliGRAM(s) every 6 hours PRN  albumin human 25% IVPB 100 milliLiter(s) every 8 hours  dextrose 40% Gel 15 Gram(s) once PRN  dextrose 5%. 1000 milliLiter(s) <Continuous>  dextrose 50% Injectable 12.5 Gram(s) once  dextrose 50% Injectable 25 Gram(s) once  dextrose 50% Injectable 25 Gram(s) once  glucagon  Injectable 1 milliGRAM(s) once PRN  heparin   Injectable 7500 Unit(s) every 8 hours  insulin glargine Injectable (LANTUS) 6 Unit(s) at bedtime  insulin regular  human corrective regimen sliding scale   every 6 hours  levothyroxine Injectable 30 MICROGram(s) <User Schedule>  midodrine 7.5 milliGRAM(s) every 8 hours  octreotide  Injectable 100 MICROGram(s) every 8 hours  pantoprazole  Injectable 80 milliGRAM(s) every 12 hours  rifAXIMin 550 milliGRAM(s) two times a day      Allergies    No Known Allergies    Intolerances        T(C): , Max: 38 (04-21-20 @ 05:14)  T(F): , Max: 100.4 (04-21-20 @ 05:14)  HR: 78 (04-21-20 @ 13:53)  BP: 169/73 (04-21-20 @ 13:53)  BP(mean): 105 (04-21-20 @ 13:53)  RR: 20 (04-21-20 @ 13:53)  SpO2: 97% (04-21-20 @ 13:53)  Wt(kg): --    04-20 @ 07:01  -  04-21 @ 07:00  --------------------------------------------------------  IN:    sodium chloride 0.9%: 300 mL  Total IN: 300 mL    OUT:    Ileostomy: 650 mL    Voided: 850 mL  Total OUT: 1500 mL    Total NET: -1200 mL      04-21 @ 07:01  -  04-21 @ 16:00  --------------------------------------------------------  IN:    Oral Fluid: 237 mL  Total IN: 237 mL    OUT:    Voided: 250 mL  Total OUT: 250 mL    Total NET: -13 mL            ROS: Unable due to limited contact    Physical exam as per primary team due to COVID protocol  Saturating AT 97% on 2 L NC      LABS:                        7.4    16.46 )-----------( 444      ( 21 Apr 2020 06:55 )             22.8     04-21    139  |  99  |  36<H>  ----------------------------<  149<H>  4.3   |  24  |  6.92<H>    Ca    8.1<L>      21 Apr 2020 06:55  Phos  5.6     04-21  Mg     2.0     04-21    TPro  6.7  /  Alb  3.4  /  TBili  0.4  /  DBili  x   /  AST  13  /  ALT  6<L>  /  AlkPhos  92  04-21      PT/INR - ( 20 Apr 2020 06:22 )   PT: 21.8 sec;   INR: 1.88          PTT - ( 20 Apr 2020 06:22 )  PTT:34.2 sec          RADIOLOGY & ADDITIONAL STUDIES:

## 2020-04-21 NOTE — PROGRESS NOTE ADULT - ATTENDING COMMENTS
events noted  MEG, hyponatremia  creat plateaued  as above, okay to proceed with trial of gentle IVF   no indication for dialysis at this time, but will continue to closely monitor

## 2020-04-21 NOTE — PROGRESS NOTE ADULT - SUBJECTIVE AND OBJECTIVE BOX
INTERVAL HPI/OVERNIGHT EVENTS:    Patient is a 60y old  Male who presents with a chief complaint of Colectomy (19 Apr 2020 10:46)  Spoke to the primary team taking care of the patient.  He was scheduled for a capsule endoscopy today and was kept NPO. patient later did not want the procedure done. Hb 7.4  he will be restarted on CLD today.   Low grade temp 100.7 noted  FSg and insulin administration reviewed  remains on abx.     Pt reports the following symptoms:  CONSTITUTIONAL:  Negative chills  CARDIOVASCULAR:  Negative for chest pain or palpitations  RESPIRATORY:  Negative for cough, wheezing  GASTROINTESTINAL:  Negative for diarrhea, constipation  NEUROLOGIC:  No headache, confusion, dizziness, lightheadedness    MEDICATIONS  (STANDING):  albumin human 25% IVPB 100 milliLiter(s) IV Intermittent every 8 hours  dextrose 5%. 1000 milliLiter(s) (50 mL/Hr) IV Continuous <Continuous>  dextrose 50% Injectable 12.5 Gram(s) IV Push once  dextrose 50% Injectable 25 Gram(s) IV Push once  dextrose 50% Injectable 25 Gram(s) IV Push once  heparin   Injectable 7500 Unit(s) SubCutaneous every 8 hours  insulin glargine Injectable (LANTUS) 6 Unit(s) SubCutaneous at bedtime  insulin regular  human corrective regimen sliding scale   SubCutaneous every 6 hours  levothyroxine Injectable 30 MICROGram(s) IV Push <User Schedule>  midodrine 7.5 milliGRAM(s) Oral every 8 hours  octreotide  Injectable 100 MICROGram(s) SubCutaneous every 8 hours  pantoprazole  Injectable 80 milliGRAM(s) IV Push every 12 hours  rifAXIMin 550 milliGRAM(s) Oral two times a day    MEDICATIONS  (PRN):  acetaminophen   Tablet .. 500 milliGRAM(s) Oral every 6 hours PRN Temp greater or equal to 38C (100.4F)  dextrose 40% Gel 15 Gram(s) Oral once PRN Blood Glucose LESS THAN 70 milliGRAM(s)/deciliter  glucagon  Injectable 1 milliGRAM(s) IntraMuscular once PRN Glucose LESS THAN 70 milligrams/deciliter      PHYSICAL EXAM  Vital Signs Last 24 Hrs  T(C): 37.1 (21 Apr 2020 13:53), Max: 38 (21 Apr 2020 05:14)  T(F): 98.7 (21 Apr 2020 13:53), Max: 100.4 (21 Apr 2020 05:14)  HR: 78 (21 Apr 2020 13:53) (70 - 96)  BP: 169/73 (21 Apr 2020 13:53) (143/66 - 169/73)  BP(mean): 105 (21 Apr 2020 13:53) (98 - 108)  RR: 20 (21 Apr 2020 13:53) (17 - 29)  SpO2: 97% (21 Apr 2020 13:53) (92% - 98%)    Due to the nature of this patient’s COVID-19 isolation status (either confirmed or suspected), this note was prepared without a bedside physical examination to prevent spread of infection and to conserve personal protective equipment during this nationwide pandemic. If possible, direct patient communication occurred via electronic measures or telephone conversation. Examination highlights were provided by a bedside nurse wearing personal protective equipment and review of pertinent medical records. Objective data (vital signs, urine output, lab results, imaging studies, medications, etc) were reviewed in detail. Face to face visits and physical examination have been limited only to patients for whom it is required for medical decision making.      LABS:                        7.4    16.46 )-----------( 444      ( 21 Apr 2020 06:55 )             22.8     04-21    139  |  99  |  36<H>  ----------------------------<  149<H>  4.3   |  24  |  6.92<H>    Ca    8.1<L>      21 Apr 2020 06:55  Phos  5.6     04-21  Mg     2.0     04-21    TPro  6.7  /  Alb  3.4  /  TBili  0.4  /  DBili  x   /  AST  13  /  ALT  6<L>  /  AlkPhos  92  04-21    PT/INR - ( 20 Apr 2020 06:22 )   PT: 21.8 sec;   INR: 1.88          PTT - ( 20 Apr 2020 06:22 )  PTT:34.2 sec    Thyroid Stimulating Hormone, Serum: 2.813 uIU/mL (04-14 @ 07:28)  Thyroid Stimulating Hormone, Serum: 2.877 uIU/mL (04-14 @ 00:34)      HbA1C: 6.9 % (04-05 @ 06:59)  7.6 % (03-31 @ 05:58)    CAPILLARY BLOOD GLUCOSE      POCT Blood Glucose.: 168 mg/dL (21 Apr 2020 11:34)  POCT Blood Glucose.: 160 mg/dL (21 Apr 2020 06:13)  POCT Blood Glucose.: 311 mg/dL (20 Apr 2020 23:23)  POCT Blood Glucose.: 325 mg/dL (20 Apr 2020 21:45)  POCT Blood Glucose.: 148 mg/dL (20 Apr 2020 16:32)      Insulin Sliding Scale requirements X 24 Hours:    RADIOLOGY & ADDITIONAL TESTS:    A/P: 60y Male with history of DM type II presenting for       1.  DM -     Please continue           units lantus at bedtime  / in the morning and        units lispro with meals and lispro moderate / low dose sliding scale 4 times daily with meals and at bedtime.  Please continue consistent carbohydrate diet.      Goal FSG is   Will continue to monitor   For discharge, pt can continue    Pt can follow up at discharge with [facility] by calling  to make an appointment.   Will discuss case with     and update primary team INTERVAL HPI/OVERNIGHT EVENTS:    Patient is a 60y old  Male who presents with a chief complaint of Colectomy (19 Apr 2020 10:46)  Spoke to the primary team taking care of the patient.  He was scheduled for a capsule endoscopy today and was kept NPO. patient later did not want the procedure done. Hb 7.4  he will be restarted on CLD today.   Low grade temp 100.7 noted  FSg and insulin administration reviewed  remains on abx.     Pt reports the following symptoms:  CONSTITUTIONAL:  Negative chills  CARDIOVASCULAR:  Negative for chest pain or palpitations  RESPIRATORY:  Negative for cough, wheezing  GASTROINTESTINAL:  Negative for diarrhea, constipation  NEUROLOGIC:  No headache, confusion, dizziness, lightheadedness    MEDICATIONS  (STANDING):  albumin human 25% IVPB 100 milliLiter(s) IV Intermittent every 8 hours  dextrose 5%. 1000 milliLiter(s) (50 mL/Hr) IV Continuous <Continuous>  dextrose 50% Injectable 12.5 Gram(s) IV Push once  dextrose 50% Injectable 25 Gram(s) IV Push once  dextrose 50% Injectable 25 Gram(s) IV Push once  heparin   Injectable 7500 Unit(s) SubCutaneous every 8 hours  insulin glargine Injectable (LANTUS) 6 Unit(s) SubCutaneous at bedtime  insulin regular  human corrective regimen sliding scale   SubCutaneous every 6 hours  levothyroxine Injectable 30 MICROGram(s) IV Push <User Schedule>  midodrine 7.5 milliGRAM(s) Oral every 8 hours  octreotide  Injectable 100 MICROGram(s) SubCutaneous every 8 hours  pantoprazole  Injectable 80 milliGRAM(s) IV Push every 12 hours  rifAXIMin 550 milliGRAM(s) Oral two times a day    MEDICATIONS  (PRN):  acetaminophen   Tablet .. 500 milliGRAM(s) Oral every 6 hours PRN Temp greater or equal to 38C (100.4F)  dextrose 40% Gel 15 Gram(s) Oral once PRN Blood Glucose LESS THAN 70 milliGRAM(s)/deciliter  glucagon  Injectable 1 milliGRAM(s) IntraMuscular once PRN Glucose LESS THAN 70 milligrams/deciliter      PHYSICAL EXAM  Vital Signs Last 24 Hrs  T(C): 37.1 (21 Apr 2020 13:53), Max: 38 (21 Apr 2020 05:14)  T(F): 98.7 (21 Apr 2020 13:53), Max: 100.4 (21 Apr 2020 05:14)  HR: 78 (21 Apr 2020 13:53) (70 - 96)  BP: 169/73 (21 Apr 2020 13:53) (143/66 - 169/73)  BP(mean): 105 (21 Apr 2020 13:53) (98 - 108)  RR: 20 (21 Apr 2020 13:53) (17 - 29)  SpO2: 97% (21 Apr 2020 13:53) (92% - 98%)    Due to the nature of this patient’s COVID-19 isolation status (either confirmed or suspected), this note was prepared without a bedside physical examination to prevent spread of infection and to conserve personal protective equipment during this nationwide pandemic. If possible, direct patient communication occurred via electronic measures or telephone conversation. Examination highlights were provided by a bedside nurse wearing personal protective equipment and review of pertinent medical records. Objective data (vital signs, urine output, lab results, imaging studies, medications, etc) were reviewed in detail. Face to face visits and physical examination have been limited only to patients for whom it is required for medical decision making.      LABS:                        7.4    16.46 )-----------( 444      ( 21 Apr 2020 06:55 )             22.8     04-21    139  |  99  |  36<H>  ----------------------------<  149<H>  4.3   |  24  |  6.92<H>    Ca    8.1<L>      21 Apr 2020 06:55  Phos  5.6     04-21  Mg     2.0     04-21    TPro  6.7  /  Alb  3.4  /  TBili  0.4  /  DBili  x   /  AST  13  /  ALT  6<L>  /  AlkPhos  92  04-21    PT/INR - ( 20 Apr 2020 06:22 )   PT: 21.8 sec;   INR: 1.88          PTT - ( 20 Apr 2020 06:22 )  PTT:34.2 sec    Thyroid Stimulating Hormone, Serum: 2.813 uIU/mL (04-14 @ 07:28)  Thyroid Stimulating Hormone, Serum: 2.877 uIU/mL (04-14 @ 00:34)      HbA1C: 6.9 % (04-05 @ 06:59)  7.6 % (03-31 @ 05:58)    CAPILLARY BLOOD GLUCOSE      POCT Blood Glucose.: 168 mg/dL (21 Apr 2020 11:34)  POCT Blood Glucose.: 160 mg/dL (21 Apr 2020 06:13)  POCT Blood Glucose.: 311 mg/dL (20 Apr 2020 23:23)  POCT Blood Glucose.: 325 mg/dL (20 Apr 2020 21:45)  POCT Blood Glucose.: 148 mg/dL (20 Apr 2020 16:32)      Insulin Sliding Scale requirements X 24 Hours:    RADIOLOGY & ADDITIONAL TESTS:    A/P: 61 y/o M with PMH of HTN, DM complicated by neuropathy, EtOH liver cirrhosis, presenting w/ pneumatosis of unknown etiology, s/p sub total colectomy and end ileostomy on 3/31, c/b fever and purulent wound infection (4/9). Found to be covid positive.     1.  DM type 2 - 7% unreliable due to anemia, complicated. Pt has poor appetite  Please continue Lantus 6 units at night  Please continue regular insulin low dose sliding scale four times daily with meals and at bedtime  Pt's fingerstick glucose goal is 140-180.     2. Hypothyroidism  - TSH 2.813  -  TPO and antithyroid antibodies negative  - Please continue levothyroxine 30mcg IV once daily  Will continue to monitor     For discharge, TBD    Pt can follow up at discharge with Dr. Underwood by calling  to make an appointment.   Case d/w with Dr. Underwood and primary team updated.

## 2020-04-21 NOTE — PROGRESS NOTE ADULT - ATTENDING COMMENTS
Agree with above.   Pt now on clear liquid diet,   insulin administration reviewed  can continue 6 units lantus at bedtime.   will be NPO past midnight  continue levothyroxine current dose  will follow

## 2020-04-21 NOTE — PROGRESS NOTE ADULT - SUBJECTIVE AND OBJECTIVE BOX
Hospital Course     This is a 59 y/o M with PMH of HTN, DM complicated by neuropathy, IVDU, ETOH liver cirrhosis, who presents to [facility] for fall at home. At [facility] patient reported that he takes lactulose at home daily and has daily BM, but he had no BM for 5 days prior to admission. 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 Patient underwent ex lap and subtotal colectomy, and ileostomy on 3/31. Pt had hyperglycemia at this time and started on insulin gtt briefly. He initially tested negative for COVID 19, but subsequent test was positive, patient was transferred to tele COVID unit without need for supplemental oxygen.     Pt had issues with hypertension during admission and remains on amlodipine briefly requiring hydralazine as well. Consulted GI to r/o hepatorenal syndrome and recommending albumin trial which pt is undergoing presently. Pt underwent paracentesis (w/o micro) on 4/13 with IR and results were negative. On 4/15 renal consulted to w/u MEG and recommending no IVF due to low sodium. Concerned for metabolic acidosis and started on sodium bicarb TID. General surgery following patient and managing wound vac. Pt transfused x1 unit pRBCs and AC was held 2/2 to bleeding. On 4/17 H/H drop but not requiring transfusion and should be trended daily. on 4/18 H/H remains stable. Lactulose started per GI (pt should have 2-3 BM per day). AC was resumed due to stable H/H. On 4/19 pt noted to be febrile to 101.7 with increasing WBC count and distended abdomen. Ruled patient out for cdiff. Pending urgent CT abd/pelvis. For concern of hepatorenal syndrome, was started briefly with albumin, midodrine, and octreotide, now all discontinued for the time being. Patient now has oxygen requirements of NC 2 L and is stable for transfer to [facility].       INTERVAL HPI/OVERNIGHT EVENTS:    OVERNIGHT: No overnight events.  SUBJECTIVE: Patient seen and examined at bedside. Patient does not want capsule endoscopy at this time.       OBJECTIVE:    VITAL SIGNS:  ICU Vital Signs Last 24 Hrs  T(C): 37.1 (21 Apr 2020 13:53), Max: 38 (21 Apr 2020 05:14)  T(F): 98.7 (21 Apr 2020 13:53), Max: 100.4 (21 Apr 2020 05:14)  HR: 78 (21 Apr 2020 17:43) (70 - 86)  BP: 162/74 (21 Apr 2020 17:43) (143/66 - 169/73)  BP(mean): 106 (21 Apr 2020 17:43) (98 - 108)  ABP: --  ABP(mean): --  RR: 17 (21 Apr 2020 17:43) (17 - 29)  SpO2: 93% (21 Apr 2020 17:43) (92% - 98%)        04-20 @ 07:01  -  04-21 @ 07:00  --------------------------------------------------------  IN: 300 mL / OUT: 1500 mL / NET: -1200 mL    04-21 @ 07:01  - 04-21 @ 17:50  --------------------------------------------------------  IN: 237 mL / OUT: 250 mL / NET: -13 mL      CAPILLARY BLOOD GLUCOSE      POCT Blood Glucose.: 212 mg/dL (21 Apr 2020 16:33)      PHYSICAL EXAM:  General: NAD, comfortable  HEENT: NCAT, PERRL, clear conjunctiva, no scleral icterus  Neck: supple, no JVD  Respiratory: CTA b/l, no wheezing, rhonchi, rales  Cardiovascular: RRR, normal S1S2, no M/R/G  Vascular: 2+ radial and DP pulses  Abdomen: wound vac in place  Extremities: WWP, no clubbing, cyanosis, or edema  Skin: No rashes present  Neuro:     MEDICATIONS:  MEDICATIONS  (STANDING):  dextrose 5%. 1000 milliLiter(s) (50 mL/Hr) IV Continuous <Continuous>  dextrose 50% Injectable 12.5 Gram(s) IV Push once  dextrose 50% Injectable 25 Gram(s) IV Push once  dextrose 50% Injectable 25 Gram(s) IV Push once  heparin   Injectable 7500 Unit(s) SubCutaneous every 8 hours  insulin glargine Injectable (LANTUS) 6 Unit(s) SubCutaneous at bedtime  insulin regular  human corrective regimen sliding scale   SubCutaneous every 6 hours  levothyroxine Injectable 30 MICROGram(s) IV Push <User Schedule>  pantoprazole  Injectable 80 milliGRAM(s) IV Push every 12 hours  rifAXIMin 550 milliGRAM(s) Oral two times a day    MEDICATIONS  (PRN):  acetaminophen   Tablet .. 500 milliGRAM(s) Oral every 6 hours PRN Temp greater or equal to 38C (100.4F)  dextrose 40% Gel 15 Gram(s) Oral once PRN Blood Glucose LESS THAN 70 milliGRAM(s)/deciliter  glucagon  Injectable 1 milliGRAM(s) IntraMuscular once PRN Glucose LESS THAN 70 milligrams/deciliter      ALLERGIES:  Allergies    No Known Allergies    Intolerances        LABS:                        7.4    16.46 )-----------( 444      ( 21 Apr 2020 06:55 )             22.8     04-21    139  |  99  |  36<H>  ----------------------------<  149<H>  4.3   |  24  |  6.92<H>    Ca    8.1<L>      21 Apr 2020 06:55  Phos  5.6     04-21  Mg     2.0     04-21    TPro  6.7  /  Alb  3.4  /  TBili  0.4  /  DBili  x   /  AST  13  /  ALT  6<L>  /  AlkPhos  92  04-21    PT/INR - ( 20 Apr 2020 06:22 )   PT: 21.8 sec;   INR: 1.88          PTT - ( 20 Apr 2020 06:22 )  PTT:34.2 sec      RADIOLOGY & ADDITIONAL TESTS: Reviewed.

## 2020-04-21 NOTE — PROGRESS NOTE ADULT - SUBJECTIVE AND OBJECTIVE BOX
INTERVAL HPI/OVERNIGHT EVENTS: Fever curve improved. Patient refusing capsule study.     ROS: UTO      ANTIBIOTICS/RELEVANT:    MEDICATIONS  (STANDING):  albumin human 25% IVPB 100 milliLiter(s) IV Intermittent every 8 hours  dextrose 5%. 1000 milliLiter(s) (50 mL/Hr) IV Continuous <Continuous>  dextrose 50% Injectable 12.5 Gram(s) IV Push once  dextrose 50% Injectable 25 Gram(s) IV Push once  dextrose 50% Injectable 25 Gram(s) IV Push once  heparin   Injectable 7500 Unit(s) SubCutaneous every 8 hours  insulin glargine Injectable (LANTUS) 6 Unit(s) SubCutaneous at bedtime  insulin regular  human corrective regimen sliding scale   SubCutaneous every 6 hours  levothyroxine Injectable 30 MICROGram(s) IV Push <User Schedule>  meropenem  IVPB 500 milliGRAM(s) IV Intermittent every 24 hours  midodrine 7.5 milliGRAM(s) Oral every 8 hours  octreotide  Injectable 100 MICROGram(s) SubCutaneous every 8 hours  pantoprazole  Injectable 80 milliGRAM(s) IV Push every 12 hours  rifAXIMin 550 milliGRAM(s) Oral two times a day    MEDICATIONS  (PRN):  acetaminophen   Tablet .. 500 milliGRAM(s) Oral every 6 hours PRN Temp greater or equal to 38C (100.4F)  dextrose 40% Gel 15 Gram(s) Oral once PRN Blood Glucose LESS THAN 70 milliGRAM(s)/deciliter  glucagon  Injectable 1 milliGRAM(s) IntraMuscular once PRN Glucose LESS THAN 70 milligrams/deciliter        Vital Signs Last 24 Hrs  T(C): 37.1 (2020 13:53), Max: 38.2 (2020 14:36)  T(F): 98.7 (2020 13:53), Max: 100.7 (2020 14:36)  HR: 78 (2020 13:53) (70 - 96)  BP: 169/73 (2020 13:53) (143/66 - 169/73)  BP(mean): 105 (2020 13:53) (98 - 108)  RR: 20 (2020 13:53) (17 - 29)  SpO2: 97% (2020 13:53) (92% - 98%)    PHYSICAL EXAM:  deferred       LABS:                        7.4    16.46 )-----------( 444      ( 2020 06:55 )             22.8     -    139  |  99  |  36<H>  ----------------------------<  149<H>  4.3   |  24  |  6.92<H>    Ca    8.1<L>      2020 06:55  Phos  5.6       Mg     2.0         TPro  6.7  /  Alb  3.4  /  TBili  0.4  /  DBili  x   /  AST  13  /  ALT  6<L>  /  AlkPhos  92  -21    PT/INR - ( 2020 06:22 )   PT: 21.8 sec;   INR: 1.88          PTT - ( 2020 06:22 )  PTT:34.2 sec  Urinalysis Basic - ( 2020 14:29 )    Color: Yellow / Appearance: Clear / S.020 / pH: x  Gluc: x / Ketone: NEGATIVE  / Bili: Negative / Urobili: 0.2 E.U./dL   Blood: x / Protein: 30 mg/dL / Nitrite: NEGATIVE   Leuk Esterase: NEGATIVE / RBC: < 5 /HPF / WBC < 5 /HPF   Sq Epi: x / Non Sq Epi: 0-5 /HPF / Bacteria: Present /HPF        MICROBIOLOGY: Culture - Body Fluid with Gram Stain (20 @ 14:57)    Gram Stain:   No organisms seen  Rare WBC's    Specimen Source: .Body Fluid paracentesis ascities fluid culture    Culture Results:   No growth to date    Culture - Blood (20 @ 02:04)    Specimen Source: .Blood Blood    Culture Results:   No growth at 2 days.        COVID-19 PCR . (20 @ 15:53)    COVID-19 PCR: Detected: This test has been validated by [employer] to be accurate;  though it has not been FDA cleared/approved by the usual pathway.  As with all laboratory tests, results should be correlated with clinical  findings.  https://www.fda.gov/media/537030/download  https://www.fda.gov/media/386288/download    RADIOLOGY & ADDITIONAL STUDIES: reviewed

## 2020-04-21 NOTE — PROGRESS NOTE ADULT - ASSESSMENT
61 yo male with DM, EtOH cirrhosis, presented two weeks ago with abdominal pain and distention, constipation, found to have colonic ischemia of undetermined etiology c/b toxic megacolon s/p subtotal colectomy 3/31; of note was COVID PCR negative on that day. Subsequent hospital course c/b fever and MEG ?HRS; abdominal wound culture 4/9 with polymicrobial growth however wound does not presently appear infected; suspect fevers due to COVID-19 infection. s/p paracentesis yesterday--negative for SBP by cell count and culture; SAAG 1.4 c/w portal hypertension as etiology of ascites. Unless there is concern for active variceal bleed (which appears not to be the case presently), recommend d/c meropenem and observe off antibiotics. If GI c/f active variceal bleed, can commence ceftriaxone 1g IV q24h ppx at that juncture.     Please reconsult with ?    ID Team 1

## 2020-04-22 ENCOUNTER — TRANSCRIPTION ENCOUNTER (OUTPATIENT)
Age: 60
End: 2020-04-22

## 2020-04-22 DIAGNOSIS — E11.9 TYPE 2 DIABETES MELLITUS WITHOUT COMPLICATIONS: ICD-10-CM

## 2020-04-22 DIAGNOSIS — D64.9 ANEMIA, UNSPECIFIED: ICD-10-CM

## 2020-04-22 DIAGNOSIS — F32.9 MAJOR DEPRESSIVE DISORDER, SINGLE EPISODE, UNSPECIFIED: ICD-10-CM

## 2020-04-22 DIAGNOSIS — K59.31 TOXIC MEGACOLON: ICD-10-CM

## 2020-04-22 DIAGNOSIS — Z29.9 ENCOUNTER FOR PROPHYLACTIC MEASURES, UNSPECIFIED: ICD-10-CM

## 2020-04-22 DIAGNOSIS — K74.60 UNSPECIFIED CIRRHOSIS OF LIVER: ICD-10-CM

## 2020-04-22 DIAGNOSIS — I10 ESSENTIAL (PRIMARY) HYPERTENSION: ICD-10-CM

## 2020-04-22 LAB
ALBUMIN SERPL ELPH-MCNC: 3.4 G/DL — SIGNIFICANT CHANGE UP (ref 3.3–5)
ALP SERPL-CCNC: 93 U/L — SIGNIFICANT CHANGE UP (ref 40–120)
ALT FLD-CCNC: 6 U/L — LOW (ref 10–45)
ANION GAP SERPL CALC-SCNC: 15 MMOL/L — SIGNIFICANT CHANGE UP (ref 5–17)
APPEARANCE UR: CLEAR — SIGNIFICANT CHANGE UP
AST SERPL-CCNC: 13 U/L — SIGNIFICANT CHANGE UP (ref 10–40)
BILIRUB SERPL-MCNC: 0.3 MG/DL — SIGNIFICANT CHANGE UP (ref 0.2–1.2)
BILIRUB UR-MCNC: NEGATIVE — SIGNIFICANT CHANGE UP
BUN SERPL-MCNC: 39 MG/DL — HIGH (ref 7–23)
CALCIUM SERPL-MCNC: 8.2 MG/DL — LOW (ref 8.4–10.5)
CHLORIDE SERPL-SCNC: 95 MMOL/L — LOW (ref 96–108)
CO2 SERPL-SCNC: 23 MMOL/L — SIGNIFICANT CHANGE UP (ref 22–31)
COLOR SPEC: YELLOW — SIGNIFICANT CHANGE UP
CREAT ?TM UR-MCNC: 86 MG/DL — SIGNIFICANT CHANGE UP
CREAT SERPL-MCNC: 6.48 MG/DL — HIGH (ref 0.5–1.3)
CRP SERPL-MCNC: 17.93 MG/DL — HIGH (ref 0–0.4)
D DIMER BLD IA.RAPID-MCNC: 3565 NG/ML DDU — HIGH
DIFF PNL FLD: ABNORMAL
GLUCOSE BLDC GLUCOMTR-MCNC: 187 MG/DL — HIGH (ref 70–99)
GLUCOSE BLDC GLUCOMTR-MCNC: 197 MG/DL — HIGH (ref 70–99)
GLUCOSE BLDC GLUCOMTR-MCNC: 209 MG/DL — HIGH (ref 70–99)
GLUCOSE BLDC GLUCOMTR-MCNC: 230 MG/DL — HIGH (ref 70–99)
GLUCOSE BLDC GLUCOMTR-MCNC: 281 MG/DL — HIGH (ref 70–99)
GLUCOSE SERPL-MCNC: 219 MG/DL — HIGH (ref 70–99)
GLUCOSE UR QL: NEGATIVE — SIGNIFICANT CHANGE UP
KETONES UR-MCNC: NEGATIVE — SIGNIFICANT CHANGE UP
LEUKOCYTE ESTERASE UR-ACNC: NEGATIVE — SIGNIFICANT CHANGE UP
MAGNESIUM SERPL-MCNC: 1.8 MG/DL — SIGNIFICANT CHANGE UP (ref 1.6–2.6)
NITRITE UR-MCNC: NEGATIVE — SIGNIFICANT CHANGE UP
OSMOLALITY UR: 282 MOSMOL/KG — SIGNIFICANT CHANGE UP (ref 100–650)
PH UR: 6 — SIGNIFICANT CHANGE UP (ref 5–8)
PHOSPHATE SERPL-MCNC: 4.3 MG/DL — SIGNIFICANT CHANGE UP (ref 2.5–4.5)
POTASSIUM SERPL-MCNC: 4.1 MMOL/L — SIGNIFICANT CHANGE UP (ref 3.5–5.3)
POTASSIUM SERPL-SCNC: 4.1 MMOL/L — SIGNIFICANT CHANGE UP (ref 3.5–5.3)
PROT SERPL-MCNC: 6.8 G/DL — SIGNIFICANT CHANGE UP (ref 6–8.3)
PROT UR-MCNC: 30 MG/DL
SODIUM SERPL-SCNC: 133 MMOL/L — LOW (ref 135–145)
SODIUM UR-SCNC: 35 MMOL/L — SIGNIFICANT CHANGE UP
SP GR SPEC: 1.01 — SIGNIFICANT CHANGE UP (ref 1–1.03)
UROBILINOGEN FLD QL: 0.2 E.U./DL — SIGNIFICANT CHANGE UP
UUN UR-MCNC: 329 MG/DL — SIGNIFICANT CHANGE UP

## 2020-04-22 PROCEDURE — 99232 SBSQ HOSP IP/OBS MODERATE 35: CPT

## 2020-04-22 PROCEDURE — 99233 SBSQ HOSP IP/OBS HIGH 50: CPT | Mod: GC

## 2020-04-22 RX ORDER — LEVOTHYROXINE SODIUM 125 MCG
50 TABLET ORAL DAILY
Refills: 0 | Status: DISCONTINUED | OUTPATIENT
Start: 2020-04-23 | End: 2020-04-28

## 2020-04-22 RX ORDER — INSULIN LISPRO 100/ML
2 VIAL (ML) SUBCUTANEOUS
Refills: 0 | Status: DISCONTINUED | OUTPATIENT
Start: 2020-04-22 | End: 2020-04-23

## 2020-04-22 RX ORDER — INSULIN GLARGINE 100 [IU]/ML
7 INJECTION, SOLUTION SUBCUTANEOUS AT BEDTIME
Refills: 0 | Status: DISCONTINUED | OUTPATIENT
Start: 2020-04-22 | End: 2020-04-24

## 2020-04-22 RX ORDER — AMLODIPINE BESYLATE 2.5 MG/1
5 TABLET ORAL DAILY
Refills: 0 | Status: DISCONTINUED | OUTPATIENT
Start: 2020-04-22 | End: 2020-04-23

## 2020-04-22 RX ORDER — PANTOPRAZOLE SODIUM 20 MG/1
40 TABLET, DELAYED RELEASE ORAL EVERY 12 HOURS
Refills: 0 | Status: DISCONTINUED | OUTPATIENT
Start: 2020-04-22 | End: 2020-06-05

## 2020-04-22 RX ORDER — TRAZODONE HCL 50 MG
50 TABLET ORAL AT BEDTIME
Refills: 0 | Status: DISCONTINUED | OUTPATIENT
Start: 2020-04-22 | End: 2020-05-04

## 2020-04-22 RX ORDER — ATORVASTATIN CALCIUM 80 MG/1
10 TABLET, FILM COATED ORAL AT BEDTIME
Refills: 0 | Status: DISCONTINUED | OUTPATIENT
Start: 2020-04-22 | End: 2020-06-05

## 2020-04-22 RX ORDER — LACTULOSE 10 G/15ML
10 SOLUTION ORAL THREE TIMES A DAY
Refills: 0 | Status: DISCONTINUED | OUTPATIENT
Start: 2020-04-22 | End: 2020-04-24

## 2020-04-22 RX ORDER — INSULIN LISPRO 100/ML
VIAL (ML) SUBCUTANEOUS
Refills: 0 | Status: DISCONTINUED | OUTPATIENT
Start: 2020-04-22 | End: 2020-04-29

## 2020-04-22 RX ORDER — CHLORHEXIDINE GLUCONATE 213 G/1000ML
1 SOLUTION TOPICAL DAILY
Refills: 0 | Status: DISCONTINUED | OUTPATIENT
Start: 2020-04-22 | End: 2020-05-25

## 2020-04-22 RX ORDER — ONDANSETRON 8 MG/1
2 TABLET, FILM COATED ORAL ONCE
Refills: 0 | Status: COMPLETED | OUTPATIENT
Start: 2020-04-22 | End: 2020-04-22

## 2020-04-22 RX ORDER — TRAZODONE HCL 50 MG
50 TABLET ORAL ONCE
Refills: 0 | Status: COMPLETED | OUTPATIENT
Start: 2020-04-22 | End: 2020-04-22

## 2020-04-22 RX ORDER — ACETAMINOPHEN 500 MG
500 TABLET ORAL EVERY 6 HOURS
Refills: 0 | Status: DISCONTINUED | OUTPATIENT
Start: 2020-04-22 | End: 2020-05-04

## 2020-04-22 RX ADMIN — INSULIN HUMAN 1: 100 INJECTION, SOLUTION SUBCUTANEOUS at 08:46

## 2020-04-22 RX ADMIN — AMLODIPINE BESYLATE 5 MILLIGRAM(S): 2.5 TABLET ORAL at 22:10

## 2020-04-22 RX ADMIN — HEPARIN SODIUM 7500 UNIT(S): 5000 INJECTION INTRAVENOUS; SUBCUTANEOUS at 05:28

## 2020-04-22 RX ADMIN — Medication 50 MILLIGRAM(S): at 22:19

## 2020-04-22 RX ADMIN — Medication 30 MICROGRAM(S): at 05:50

## 2020-04-22 RX ADMIN — ATORVASTATIN CALCIUM 10 MILLIGRAM(S): 80 TABLET, FILM COATED ORAL at 22:10

## 2020-04-22 RX ADMIN — Medication 50 MILLIGRAM(S): at 01:29

## 2020-04-22 RX ADMIN — CHLORHEXIDINE GLUCONATE 1 APPLICATION(S): 213 SOLUTION TOPICAL at 22:11

## 2020-04-22 RX ADMIN — PANTOPRAZOLE SODIUM 80 MILLIGRAM(S): 20 TABLET, DELAYED RELEASE ORAL at 05:27

## 2020-04-22 RX ADMIN — HEPARIN SODIUM 7500 UNIT(S): 5000 INJECTION INTRAVENOUS; SUBCUTANEOUS at 13:16

## 2020-04-22 RX ADMIN — INSULIN GLARGINE 7 UNIT(S): 100 INJECTION, SOLUTION SUBCUTANEOUS at 22:30

## 2020-04-22 RX ADMIN — Medication 6: at 22:31

## 2020-04-22 RX ADMIN — LACTULOSE 10 GRAM(S): 10 SOLUTION ORAL at 22:10

## 2020-04-22 RX ADMIN — Medication 500 MILLIGRAM(S): at 22:10

## 2020-04-22 RX ADMIN — INSULIN HUMAN 2: 100 INJECTION, SOLUTION SUBCUTANEOUS at 12:04

## 2020-04-22 RX ADMIN — LACTULOSE 10 GRAM(S): 10 SOLUTION ORAL at 13:17

## 2020-04-22 RX ADMIN — PANTOPRAZOLE SODIUM 40 MILLIGRAM(S): 20 TABLET, DELAYED RELEASE ORAL at 17:29

## 2020-04-22 RX ADMIN — Medication 500 MILLIGRAM(S): at 10:27

## 2020-04-22 RX ADMIN — HEPARIN SODIUM 7500 UNIT(S): 5000 INJECTION INTRAVENOUS; SUBCUTANEOUS at 22:13

## 2020-04-22 RX ADMIN — ONDANSETRON 2 MILLIGRAM(S): 8 TABLET, FILM COATED ORAL at 01:12

## 2020-04-22 NOTE — PROGRESS NOTE ADULT - ASSESSMENT
61 y/o M with PMH of HTN, DM complicated by neuropathy, IVDU, ETOH liver cirrhosis, who presents to [facility] for fall at home. At [facility] patient reported that he takes lactulose at home daily and has daily BM, but he had no BM for 5 days prior to admission. 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 Patient underwent ex lap and subtotal colectomy, and ileostomy on 3/31.  COVID +    # Nonoliguric MEG (acute kidney injury) likely ATN  - Ddx:  likely combination of pre-renal, intrinsic cause due to covid19 infection, contrast exposure on 4/10 vs vancomycin toxicity  - Saturating AT 97% on 2 L NC  - Cr plateauted at 6.9/ BUN 36 - no new labs seen today  - please send BMP  -  CC/24 HRS  - ileostomy output 330 cc  - net even fluid balance  - continue gentle iv hydration if high GI output  - avoid ACE/ARB/NSAIDS/Metformin/Contrast  - strict i/o  - Hyponatremia improved,  metabolic acidosis improved - as per yesterday's  labs

## 2020-04-22 NOTE — PROGRESS NOTE ADULT - PROBLEM SELECTOR PLAN 9
F: none  E: replete as needed   N: clear liquid consistent carb   GI ppx: protonix BID   DVT ppx: heparin 7500U q12h; DVT neg by ultrasound

## 2020-04-22 NOTE — DISCHARGE NOTE PROVIDER - NSDCCPTREATMENT_GEN_ALL_CORE_FT
PRINCIPAL PROCEDURE  Procedure: Surgical pathology procedure  Findings and Treatment: 1. Toxic megacolon, ileocolectomy:  - Ischemic necrosis of colon, frequently transmural, with  variable hemorrhage and  frequent acute inflammation, the latter most pronounced  within the muscularis  - Appendix showing acute appendicitis and variable ischemic  change  - Ischemic change largely limited to colon, ending just  prior to transition to small  intestine  - Proximal small intestinal margin and remaining small  intestine viable  - Distal colonic margin ischemic  2. Omentum:  - Patchy hemorrhage and at least focal acute inflammation  Comment:  The etiology of the ischemia is not clear.  While some blood  vessels of the submucosa and subserosa show a slight  suggestion of thrombosis, the features could easily be secondary  to the ischemic process.      SECONDARY PROCEDURE  Procedure: CT chest wo con  Findings and Treatment: 3/30/20  Impression:   1. No evidence of pneumothorax or hemothorax. No acute osseous pathology.  2. Trace centrilobular emphysema.  3. Mild small airways inflammation with scattered tree-in-bud centrilobular micronodules and air trapping.  4. 10 mm nodule demonstrating eccentric calcification within the left upper lobe. This may represent the sequela of remote granulomatous disease however in view of the eccentric location of the calcification other etiologies such as carcinoma cannot be excluded. Paraseptal and with a remote thoracic CT would be of value which is not presently available. If no prior imaging is available, further evaluation to include Whole-body PET/CT, histological sampling and/or short interval surveillance in 3 months is suggested to confirm stability.  5. Bibasilar linear atelectasis/scarring most pronounced within the lingula and left lower lobe.  6. Distended stomach with fluid which may represent gastroparesis versus gastric outlet obstruction.    Procedure: Transthoracic echocardiography (TTE)  Findings and Treatment: CONCLUSIONS:   1. No significant valvular disease.   2. The right ventricle is normal in size. Right ventricular systolic function is probably normal.   3. There is mild concentric left ventricular hypertrophy. The left ventricle is normal in size and systolic function with a calculated ejection fraction of 65-70%.   4. Small pericardial effusion.    Procedure: CT abdomen pelvis  Findings and Treatment: 4/19/20  IMPRESSION:  Persistent large ascites and peritonitis.  Persistent bilateral pleural effusions and pericardial effusion.  Subtotal colectomy with right lower quadrant ileostomy.  4/16/20  IMPRESSION:  Gastric distention, likely due to ileus/gastroparesis. A few mildly dilated loops of small bowel, likely due to ileus.   Moderate ascites, slightly decreased since prior study. No walled off fluid collection.   3 cm soft tissue density within the peritoneal space below the right hepatic lobe may represent a blood clot.  Small to moderate right and small left pleural effusions, enlarged since prior study. Small pericardial effusion.  3/30/20  IMPRESSION: Colonic ileus/megacolon. Probable competent ileocecal valve. Pneumatosis involving superior cecum and proximal ascending colon. Possible incipient perforation of ascending colon/cecum. Cholelithiasis. Pericardial effusion. Hypovolemia...      Procedure: Limited B-scan ultrasound of retroperitoneal aorta  Findings and Treatment: IMPRESSION:  1.  No hydronephrosis. Medical renal disease.  2.  Moderate multiloculated ascites.  3.  Gallbladder sludge.

## 2020-04-22 NOTE — DISCHARGE NOTE PROVIDER - NSDCCPCAREPLAN_GEN_ALL_CORE_FT
PRINCIPAL DISCHARGE DIAGNOSIS  Diagnosis: Intestinal perforation  Assessment and Plan of Treatment:       SECONDARY DISCHARGE DIAGNOSES  Diagnosis: Hyperglycemia  Assessment and Plan of Treatment:     Diagnosis: Megacolon  Assessment and Plan of Treatment: PRINCIPAL DISCHARGE DIAGNOSIS  Diagnosis: Megacolon  Assessment and Plan of Treatment: You were admitted and found to have something called toxic megacolon. You had to undergo an urgent surgery, in which our surgeons took out part of your bowel and you now have an ileostomy. You will need to take good care of your ileostomy and keep up with changing it with the supplies that you are provided. We have gotten our advanced practice nurses to teach you how to care for and change your ileostomy so please make sure to follow their instructions.   During your long hospitalization, we treated you with antibiotics called vancomycin and zosyn. You had what we suspected a wound infection frmo your surgical site and we treated you with 2 weeks of zosyn. The vancomycin however, we had stopped because it can be harmful to the kidneys and you had a large injury to your kidneys as well (detailed below).      SECONDARY DISCHARGE DIAGNOSES  Diagnosis: Decompensated hepatic cirrhosis  Assessment and Plan of Treatment: You have known cirrhosis, which you are taking lactulose at home. You came in with a complication of cirrhosis called ascites, which is the fluid in your abdomen. During your hospitalization, we did a tap of this fluid to sample it; this procedure is called a paracentesis. The tap that we did not show an infection your abdominal fluid; instead it confirmed that it was from fluid back-up into your abdomen caused by your cirrhosis. We started you on rifaximin for your cirrhosis, which you will need to continue to take.   Please continue to take rifaximin 550mg twice a day.   ***instructions on what to do with lactulose to go here      Diagnosis: Anemia  Assessment and Plan of Treatment: You also were found to be anemic. Your hemoglobin dropped below 7 multiple times while in the hospital and you received a total of 4 units of packed red blood cells while you were admitted in the hospital. Your hemoglobin was stable around 7.5, but intermittently dropped. A low hemoglobin can be cause by kidney disease, ACUTE BLOOD LOSS, iron deficiency, and other chronic diseases. Our gastroenterology doctors were concerned that you may be bleeding from varices, which patients with cirrhosis can often develop. They wanted to do a video capsule endoscopy to evaluate for bleeding in your GI tract, however you did not want this. You understood the risks of not pursuing this study, that we would not know for certain that you were not bleeding in your GI tract.   Thus, it is very important for you to follow up with a GI doctor or a hepatologist, specifically in order to monitor your cirrhosis and possibly get an outpatient endoscopy to evaluate for  bleeding. If you have dizziness, headaches, see blood or black stools in your ileostomy bag, please go to the ER immediately.    Diagnosis: Acute renal failure  Assessment and Plan of Treatment: Our labs showed that your kidneys also took a great hit and that they were very close to failing. However, with fluids and not giving you any further medications that could cause injury to your kidneys, they slowly recovered. You did not need to receive dialysis, they recovered on their own. Your creatinine on discharge was 3.4.  Our kidney doctors evaluated you here and thought that your kidney injury was due to something called ATN (acute tubular nephrosis). We started you on sodium bicarbonate 650mg three times a day.   Please continue sodium bicarbonate 650mg three times a day and follow up with a nephrologist or your PCP to have your kidney level (Creatinine) checked.    Diagnosis: COVID-19  Assessment and Plan of Treatment: You have tested POSITIVE for the novel coronavirus (COVID-19). Upon discharge, you must self-quarantine for 14 days, or until the Department of Health contacts you. Please wear a face mask if you are around other individuals. Try to avoid contact with house members, family, and friends for the duration of this quarantine. Please follow up with your primary care physician within 2-3 weeks of your discharge from the hospital. Please take all medications as prescribed. If you experience any worsening or recurrence of your symptoms, particularly worsening or high fever, shortness of breathe, extreme fatigue, or bloody cough please call [phone] immediately or report to the nearest Emergency Department.    Diagnosis: Diabetes  Assessment and Plan of Treatment: Our endocrinologist followed you here for your diabetes. We managed you on insulin here while checking your fingerstick glucose levels. We will be discharging you on lantus XX U and lispro XXU   Please follow up with an endocrinologist or your PCP to follow your diabetes.    Diagnosis: Hyperglycemia  Assessment and Plan of Treatment:     Diagnosis: Megacolon  Assessment and Plan of Treatment: PRINCIPAL DISCHARGE DIAGNOSIS  Diagnosis: Megacolon  Assessment and Plan of Treatment: Based on clinical findings and imaging studies you were diagnosed with toxic megacolon. You underwent urgent surgery; a segment of your bowel was removed and repaired to form an ileostomy. During your long hospitalization, we treated you with IV antibiotics for a wound infection at your surgical site. You will need to take good care of your ileostomy as directed by our woundcare specialists. Please follow the woundcare instructions as below.   Ostomy care 2x/wk and prn: Place stoma ring around stoma prior to placing appliance  Wound care 2x/wk and prn: Cleanse wounds with NS, pat dry. Moisten 2x2s and lightly pack into open sites within inferior wound, apply thick layer of Triad ointment/cream to remainder of wound bed. Apply thick layer of Triad to inferior wound, loosely pack with 4x4 then cover both wounds with ABD pad, tape to secure.        SECONDARY DISCHARGE DIAGNOSES  Diagnosis: Anemia  Assessment and Plan of Treatment: You were diagnosed with anemia, which is an abnormally low level of hemoglobin (the protein that carries oxygen throughout the blood). You required blood transfusions during your hospital stay. A common cause of acute blood loss includes bleeding from the GI tract; you were thus seen and evaluated by our gastroenterologists, who recommended video capsule endoscopy to evaluate for bleeding in your GI tract, however you refused this procedure. You expressed that you understood the risks of not pursuing this study. It is important for you to follow up with a GI specialist for proper follow-up. If you experience dizziness, blood/black stool in your ileostomy bag, please seek further care in the ED immediately.    Diagnosis: Decompensated hepatic cirrhosis  Assessment and Plan of Treatment: You have a known history of cirrhosis and resultant ascites (fluid in your abdomen). During your hospitalization, a paracentesis was performed - during this procedure, a fluid sample is removed both for diagnostic and therapeutic purposes. Laboratory studies of this sample confirmed that the fluid buildup was likely caused by your cirrhosis. Please continue to take your medications as prescribed.      Diagnosis: COVID-19  Assessment and Plan of Treatment: Based on your history, clinical presentation, imaging studies, and labwork (including a [id number] nasal swab), you were diagnosed with COVID-19 pneumonia. Further treatment for this infection is supportive care, which includes rest, maintaining a healthy diet with sufficient hydration, and taking acetaminophen (Tylenol) for fever and muscle aches as needed. You may take Tylenol every 6 hours as needed, with a maximum daily dose of 4,000 mg a day. Please visit your nearest urgent care or emergency department should you start to experience: severe shortness of breath, severe cough/wheezing/difficulty breathing, or fever >103 for 3 days. If you have any questions, please call your primary care provider.

## 2020-04-22 NOTE — PROGRESS NOTE ADULT - ATTENDING COMMENTS
Patient w/ downtrending leukocytosis; no obvious infectious process apparent; patient afebrile. Elevated Cr likely 2/2 ATN; renal following. No indication for urgent HD; will continue to monitor. Hgb stable w/ no melenic/bloody ostomy output or other indication of apparent bleed. Will continue to maintain active T/S. Patient currently refusing capsule endoscopy. No respiratory complications 2/2 COVID.

## 2020-04-22 NOTE — PROGRESS NOTE ADULT - PROBLEM SELECTOR PLAN 5
Normocytic anemia. Likely 2/2 anemia of chronic disease and kidney dysfunction vs acute blood loss. Hgb has been stable for the past couple of days with no evidence of acute GI bleeding   - s/p 3U pRBCs total this admission   - continue po pantoprazole 40mg BID   - monitor for signs of bleeding   - GI following and planned for video capsule endoscopy today, but pt refused

## 2020-04-22 NOTE — PROGRESS NOTE ADULT - SUBJECTIVE AND OBJECTIVE BOX
INTERVAL HPI/OVERNIGHT EVENTS:    Patient is a 60y old  Male who presents with a chief complaint of Colectomy (19 Apr 2020 10:46)      Pt reports the following symptoms:    CONSTITUTIONAL:  Negative fever or chills, feels well, good appetite  EYES:  Negative  blurry vision or double vision  CARDIOVASCULAR:  Negative for chest pain or palpitations  RESPIRATORY:  Negative for cough, wheezing, or SOB   GASTROINTESTINAL:  Negative for nausea, vomiting, diarrhea, constipation, or abdominal pain  GENITOURINARY:  Negative frequency, urgency or dysuria  NEUROLOGIC:  No headache, confusion, dizziness, lightheadedness    MEDICATIONS  (STANDING):  amLODIPine   Tablet 5 milliGRAM(s) Oral daily  dextrose 5%. 1000 milliLiter(s) (50 mL/Hr) IV Continuous <Continuous>  dextrose 50% Injectable 12.5 Gram(s) IV Push once  dextrose 50% Injectable 25 Gram(s) IV Push once  dextrose 50% Injectable 25 Gram(s) IV Push once  heparin   Injectable 7500 Unit(s) SubCutaneous every 8 hours  insulin glargine Injectable (LANTUS) 6 Unit(s) SubCutaneous at bedtime  insulin regular  human corrective regimen sliding scale   SubCutaneous every 6 hours  lactulose Syrup 10 Gram(s) Oral three times a day  pantoprazole    Tablet 40 milliGRAM(s) Oral every 12 hours  rifAXIMin 550 milliGRAM(s) Oral two times a day    MEDICATIONS  (PRN):  acetaminophen   Tablet .. 500 milliGRAM(s) Oral every 6 hours PRN Temp greater or equal to 38C (100.4F), Mild Pain (1 - 3)  dextrose 40% Gel 15 Gram(s) Oral once PRN Blood Glucose LESS THAN 70 milliGRAM(s)/deciliter  glucagon  Injectable 1 milliGRAM(s) IntraMuscular once PRN Glucose LESS THAN 70 milligrams/deciliter  traZODone 50 milliGRAM(s) Oral at bedtime PRN Sleep      PHYSICAL EXAM  Vital Signs Last 24 Hrs  T(C): 36.7 (22 Apr 2020 06:38), Max: 37.1 (21 Apr 2020 13:53)  T(F): 98.1 (22 Apr 2020 06:38), Max: 98.7 (21 Apr 2020 13:53)  HR: 79 (22 Apr 2020 06:38) (76 - 79)  BP: 145/53 (22 Apr 2020 06:38) (145/53 - 169/73)  BP(mean): 106 (21 Apr 2020 17:43) (105 - 106)  RR: 17 (22 Apr 2020 06:38) (17 - 20)  SpO2: 93% (22 Apr 2020 08:32) (90% - 97%)    Constitutional: wn/wd in NAD.   HEENT: NCAT, MMM, OP clear, EOMI, no proptosis or lid retraction  Neck: no thyromegaly or palpable thyroid nodules   Respiratory: lungs CTAB.  Cardiovascular: regular rhythm, normal S1 and S2, no audible murmurs, no peripheral edema  GI: soft, NT/ND, no masses/HSM appreciated.  Neurology: no tremors, DTR 2+  Skin: no visible rashes/lesions  Psychiatric: AAO x 3, normal affect/mood.    LABS:                        7.4    16.46 )-----------( 444      ( 21 Apr 2020 06:55 )             22.8     04-22    133<L>  |  95<L>  |  39<H>  ----------------------------<  219<H>  4.1   |  23  |  6.48<H>    Ca    8.2<L>      22 Apr 2020 11:06  Phos  4.3     04-22  Mg     1.8     04-22    TPro  6.8  /  Alb  3.4  /  TBili  0.3  /  DBili  x   /  AST  13  /  ALT  6<L>  /  AlkPhos  93  04-22        Thyroid Stimulating Hormone, Serum: 2.813 uIU/mL (04-14 @ 07:28)  Thyroid Stimulating Hormone, Serum: 2.877 uIU/mL (04-14 @ 00:34)      HbA1C: 6.9 % (04-05 @ 06:59)  7.6 % (03-31 @ 05:58)    CAPILLARY BLOOD GLUCOSE      POCT Blood Glucose.: 230 mg/dL (22 Apr 2020 11:58)  POCT Blood Glucose.: 187 mg/dL (22 Apr 2020 08:19)  POCT Blood Glucose.: 209 mg/dL (22 Apr 2020 00:33)  POCT Blood Glucose.: 212 mg/dL (21 Apr 2020 16:33)      Insulin Sliding Scale requirements X 24 Hours:    RADIOLOGY & ADDITIONAL TESTS:    A/P: 60y Male with history of DM type II presenting for       1.  DM -     Please continue           units lantus at bedtime  / in the morning and        units lispro with meals and lispro moderate / low dose sliding scale 4 times daily with meals and at bedtime.  Please continue consistent carbohydrate diet.      Goal FSG is   Will continue to monitor   For discharge, pt can continue    Pt can follow up at discharge with [facility] by calling  to make an appointment.   Will discuss case with     and update primary team INTERVAL HPI/OVERNIGHT EVENTS:    Patient is a 60y old  Male who presents with a chief complaint of Colectomy (19 Apr 2020 10:46)  Spoke to the primary team taking care of the patient.  As per nephro - monitoring renal function. may consider dialysis  HB was 7.4 today.  On CLD today. saturating well on RA  FSg and insulin administration reviewed    Pt reports the following symptoms:  CONSTITUTIONAL:  Negative chills  CARDIOVASCULAR:  Negative for chest pain or palpitations  RESPIRATORY:  Negative for cough, wheezing  GASTROINTESTINAL:  Negative for diarrhea, constipation  NEUROLOGIC:  No headache, confusion, dizziness, lightheadedness    MEDICATIONS  (STANDING):  amLODIPine   Tablet 5 milliGRAM(s) Oral daily  dextrose 5%. 1000 milliLiter(s) (50 mL/Hr) IV Continuous <Continuous>  dextrose 50% Injectable 12.5 Gram(s) IV Push once  dextrose 50% Injectable 25 Gram(s) IV Push once  dextrose 50% Injectable 25 Gram(s) IV Push once  heparin   Injectable 7500 Unit(s) SubCutaneous every 8 hours  insulin glargine Injectable (LANTUS) 6 Unit(s) SubCutaneous at bedtime  insulin regular  human corrective regimen sliding scale   SubCutaneous every 6 hours  lactulose Syrup 10 Gram(s) Oral three times a day  pantoprazole    Tablet 40 milliGRAM(s) Oral every 12 hours  rifAXIMin 550 milliGRAM(s) Oral two times a day    MEDICATIONS  (PRN):  acetaminophen   Tablet .. 500 milliGRAM(s) Oral every 6 hours PRN Temp greater or equal to 38C (100.4F), Mild Pain (1 - 3)  dextrose 40% Gel 15 Gram(s) Oral once PRN Blood Glucose LESS THAN 70 milliGRAM(s)/deciliter  glucagon  Injectable 1 milliGRAM(s) IntraMuscular once PRN Glucose LESS THAN 70 milligrams/deciliter  traZODone 50 milliGRAM(s) Oral at bedtime PRN Sleep      PHYSICAL EXAM  Vital Signs Last 24 Hrs  T(C): 36.7 (22 Apr 2020 06:38), Max: 37.1 (21 Apr 2020 13:53)  T(F): 98.1 (22 Apr 2020 06:38), Max: 98.7 (21 Apr 2020 13:53)  HR: 79 (22 Apr 2020 06:38) (76 - 79)  BP: 145/53 (22 Apr 2020 06:38) (145/53 - 169/73)  BP(mean): 106 (21 Apr 2020 17:43) (105 - 106)  RR: 17 (22 Apr 2020 06:38) (17 - 20)  SpO2: 93% (22 Apr 2020 08:32) (90% - 97%)    Due to the nature of this patient’s COVID-19 isolation status (either confirmed or suspected), this note was prepared without a bedside physical examination to prevent spread of infection and to conserve personal protective equipment during this nationwide pandemic. If possible, direct patient communication occurred via electronic measures or telephone conversation. Examination highlights were provided by a bedside nurse wearing personal protective equipment and review of pertinent medical records. Objective data (vital signs, urine output, lab results, imaging studies, medications, etc) were reviewed in detail. Face to face visits and physical examination have been limited only to patients for whom it is required for medical decision making.    LABS:                        7.4    16.46 )-----------( 444      ( 21 Apr 2020 06:55 )             22.8     04-22    133<L>  |  95<L>  |  39<H>  ----------------------------<  219<H>  4.1   |  23  |  6.48<H>    Ca    8.2<L>      22 Apr 2020 11:06  Phos  4.3     04-22  Mg     1.8     04-22    TPro  6.8  /  Alb  3.4  /  TBili  0.3  /  DBili  x   /  AST  13  /  ALT  6<L>  /  AlkPhos  93  04-22        Thyroid Stimulating Hormone, Serum: 2.813 uIU/mL (04-14 @ 07:28)  Thyroid Stimulating Hormone, Serum: 2.877 uIU/mL (04-14 @ 00:34)      HbA1C: 6.9 % (04-05 @ 06:59)  7.6 % (03-31 @ 05:58)    CAPILLARY BLOOD GLUCOSE      POCT Blood Glucose.: 230 mg/dL (22 Apr 2020 11:58)  POCT Blood Glucose.: 187 mg/dL (22 Apr 2020 08:19)  POCT Blood Glucose.: 209 mg/dL (22 Apr 2020 00:33)  POCT Blood Glucose.: 212 mg/dL (21 Apr 2020 16:33)      Insulin Sliding Scale requirements X 24 Hours:    RADIOLOGY & ADDITIONAL TESTS:

## 2020-04-22 NOTE — DISCHARGE NOTE PROVIDER - HOSPITAL COURSE
59 y/o M with PMH of HTN, DM complicated by neuropathy, IVDU, ETOH liver cirrhosis, who presents to [facility] for fall at home. At [facility] patient reported that he takes lactulose at home daily and has daily BM, but he had no BM for 5 days prior to admission. 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 Patient underwent ex lap and subtotal colectomy, and ileostomy on 3/31. Pt had hyperglycemia at this time and started on insulin gtt briefly. He initially tested negative for COVID 19, but subsequent test was positive, patient was transferred to tele COVID unit without need for supplemental oxygen.         Pt had issues with hypertension during admission and remains on amlodipine briefly requiring hydralazine as well. Consulted GI to r/o hepatorenal syndrome and recommending albumin trial which pt is undergoing presently. Pt underwent paracentesis (w/o micro) on 4/13 with IR and results were negative. On 4/15 renal consulted to w/u MEG and recommending no IVF due to low sodium. Concerned for metabolic acidosis and started on sodium bicarb TID. General surgery following patient and managing wound vac. Pt transfused x1 unit pRBCs and AC was held 2/2 to bleeding. On 4/17 H/H drop but not requiring transfusion and should be trended daily. on 4/18 H/H remains stable. Lactulose started per GI (pt should have 2-3 BM per day). AC was resumed due to stable H/H. On 4/19 pt noted to be febrile to 101.7 with increasing WBC count and distended abdomen. Ruled patient out for cdiff. Pending urgent CT abd/pelvis. For concern of hepatorenal syndrome, was started briefly with albumin, midodrine, and octreotide, now all discontinued for the time being. Pt weaned to RA and saturating well.        Problem List/Main Diagnoses (system-based):     1. COVID-19 +     - s/p hydroxychloroquine and azithromycin 4/13-4/16    - saturating well on RA, sometimes on 2L; placed on 2L for comfort for saturations this AM to 90% on RA.         2. Cirrhosis of liver with ascites.  Plan: Decompensated cirrhosis likely 2/2 alcohol use, c/b ascites, hepatic encephalopathy on admission (resolved)    - CT abd/pelvis with persistent large ascites and peritonitis; bilateral pleural effusions and pericardial effusion    - MELD-Na score 28    - s/p paracentesis on 4/10 with negative cytology but not sent for cell count/culture, however was on zosyn for 2 weeks so GI with no concerns for SBP     - repeat paracentesis 4/19, but unable to aspirate enough fluid?     - SAAG 1.4 consistent with portal hypertension as etiology of ascites    - continue rifaximin 550mg BID (changed from lactulose) for hepatic encephalopathy     - per GI, can restart lactulose and IV PPI BID can be changed to PO pantoprazole 40mg BID     - unclear if pt with varices; GI recommending outpatient EGD to evaluate if pt with varices     - pt refusing video capsule endoscopy this admission; explained risks of not undergoing procedure and pt understands and repeats back risks of refusing procedure.     - pt needs screening for HCC outpatient and follow up with hepatology outpatient        #r/o SBP     SAAG 1.4 consistent with portal hypertension as etiology of SBP     - ID following, appreciate recs     - ID recommending observe off abx (previously on meropenem 4/20-4/21)     - if GI concerned for active variceal bleed (not currently at this time), ID recommends restarting IV CTX 1g q24h ppx.            3. Toxic megacolon.  Plan: Toxic megacolon s/p ex lap and subtotal colectomy and ileostomy 3/31; course c/b fever and purulent wound infection (4/9), treated with vanc (4/19-4/13) and zosyn.     - s/p laparotomy with wound vac in place (gen surg following)     - ileostomy in place draining brown stool with no signs of bleeding    - general surgery following, appreciate recs         #wound vac    - would care NP following and continue teaching to patient on wound vac care and instructions    - will need wound care on discharge.            4. MEG (acute kidney injury). Nonoliguric MEG 2/2 likey ATN, per nephrology. Per renal, likely combination of pre-renal, intrinsic cause 2/2 COVID-19 and contrast exposure on 4/10 vs vancomycin toxicity.     - previously on albumin, octreotide, and midodrine for albumin trial for possible hepatorenal syndrome, but without improvement in kidney function    - net even fluid balance     - renal recommending gentle IV hydration if high GI output    - FeNa 2.0%; FeUrea 63% intrinsic renal disease            5. Anemia. Normocytic anemia. Likely 2/2 anemia of chronic disease and kidney dysfunction vs acute blood loss. Hgb has been stable for the past couple of days with no evidence of acute GI bleeding     - s/p 3U pRBCs total this admission     - continue po pantoprazole 40mg BID     - monitor for signs of bleeding     - GI following and planned for video capsule endoscopy today, but pt refused.         Inpatient treatment course:         New medications:         Labs to be followed outpatient:         Exam to be followed outpatient:     : 61 y/o M with PMH of HTN, DM complicated by neuropathy, IVDU, ETOH liver cirrhosis, who presents to [facility] for fall at home. At [facility] patient reported that he takes lactulose at home daily and has daily BM, but he had no BM for 5 days prior to admission. 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 Patient underwent ex lap and subtotal colectomy, and ileostomy on 3/31. Pt had hyperglycemia at this time and started on insulin gtt briefly. He initially tested negative for COVID 19, but subsequent test was positive, patient was transferred to tele COVID unit without need for supplemental oxygen.         Pt had issues with hypertension during admission and remains on amlodipine briefly requiring hydralazine as well. Consulted GI to r/o hepatorenal syndrome and recommending albumin trial which pt is undergoing presently. Pt underwent paracentesis (w/o micro) on 4/13 with IR and results were negative. On 4/15 renal consulted to w/u MEG and recommending no IVF due to low sodium. Concerned for metabolic acidosis and started on sodium bicarb TID. General surgery following patient and managing wound vac. Pt transfused x1 unit pRBCs and AC was held 2/2 to bleeding. On 4/17 H/H drop but not requiring transfusion and should be trended daily. on 4/18 H/H remains stable. Lactulose started per GI (pt should have 2-3 BM per day). AC was resumed due to stable H/H. On 4/19 pt noted to be febrile to 101.7 with increasing WBC count and distended abdomen. Ruled patient out for cdiff. Pending urgent CT abd/pelvis. For concern of hepatorenal syndrome, was started briefly with albumin, midodrine, and octreotide, now all discontinued for the time being. Pt weaned to RA and saturating well.        Problem List/Main Diagnoses (system-based):     1. COVID-19 +     - s/p hydroxychloroquine and azithromycin 4/13-4/16    - saturating well on RA, sometimes on 2L; placed on 2L for comfort for saturations this AM to 90% on RA.         #hypercoagulable state 2/2 COVID     - pt with high D-dimer to max 5000s while inpatient, down to 3000s on discharge.     Pt is high risk in setting of COVID and qualifies for extended VTE ppx on discharge, however in light of possible/likely bleeding 2/2 varices (pt refused evaluation of varices), will not discharge with xarelto, especially because there is high risk of GI bleeding.        2. Cirrhosis of liver with ascites.    Decompensated cirrhosis likely 2/2 alcohol use, c/b ascites, hepatic encephalopathy on admission (resolved)    - CT abd/pelvis with persistent large ascites and peritonitis; bilateral pleural effusions and pericardial effusion    - MELD-Na score 28    - s/p paracentesis on 4/10 with negative cytology but not sent for cell count/culture, however was on zosyn for 2 weeks so GI with no concerns for SBP     - repeat paracentesis 4/19, but unable to aspirate enough fluid?     - SAAG 1.4 consistent with portal hypertension as etiology of ascites    - continue rifaximin 550mg BID (changed from lactulose) for hepatic encephalopathy     - per GI, can restart lactulose and IV PPI BID can be changed to PO pantoprazole 40mg BID     - unclear if pt with varices; GI recommending outpatient EGD to evaluate if pt with varices     - pt refusing video capsule endoscopy this admission; explained risks of not undergoing procedure and pt understands and repeats back risks of refusing procedure.     - pt needs screening for HCC outpatient and follow up with hepatology outpatient        #r/o SBP     SAAG 1.4 consistent with portal hypertension as etiology of SBP     - ID following, appreciate recs     - ID recommending observe off abx (previously on meropenem 4/20-4/21)     - if GI concerned for active variceal bleed (not currently at this time), ID recommends restarting IV CTX 1g q24h ppx.            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     - s/p laparotomy with wound vac in place (gen surg following)     - ileostomy in place draining brown stool with no signs of bleeding    - needs ileostomy care outpatient            4. MEG (acute kidney injury). RESOLVING. Nonoliguric MEG 2/2 likey ATN, per nephrology. Per renal, likely combination of pre-renal, intrinsic cause 2/2 COVID-19 and contrast exposure on 4/10 vs vancomycin toxicity.     - previously on albumin, octreotide, and midodrine for albumin trial for possible hepatorenal syndrome, but without improvement in kidney function    - net even fluid balance     - renal recommending gentle IV hydration if high GI output    - FeNa 2.0%; FeUrea 63% intrinsic renal disease            5. Anemia. Normocytic anemia. Likely 2/2 anemia of chronic disease and kidney dysfunction vs acute blood loss. Hgb has been stable for the past couple of days with no evidence of acute GI bleeding     - s/p 4U pRBCs total this admission     - continue po pantoprazole 40mg BID     - monitor for signs of bleeding     - GI following and planned for video capsule endoscopy during admission, but pt refused.         Inpatient treatment course:     as above        New medications:     - sodium bicarbonate 650mg TID         Pt is high risk in setting of COVID and qualifies for extended VTE ppx on discharge, however in light of possible/likely bleeding 2/2 varices (pt refused evaluation of varices), will not discharge with xarelto, especially because there is high risk of GI bleeding. 59 y/o M with PMH of HTN, DM complicated by neuropathy, IVDU, ETOH liver cirrhosis, who presents to [facility] for fall at home. At [facility] patient reported that he takes lactulose at home daily and has daily BM, but he had no BM for 5 days prior to admission. 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 Patient underwent ex lap and subtotal colectomy, and ileostomy on 3/31. Pt had hyperglycemia at this time and started on insulin gtt briefly. He initially tested negative for COVID 19, but subsequent test was positive, patient was transferred to tele COVID unit without need for supplemental oxygen.         Problem List/Main Diagnoses (system-based):     1. COVID-19 +     - s/p hydroxychloroquine and azithromycin 4/13-4/16    - saturating well on RA, sometimes on 2L; placed on 2L for comfort for saturations this AM to 90% on RA.         #hypercoagulable state 2/2 COVID     - pt with high D-dimer to max 5000s while inpatient, down to 3000s on discharge.     Pt is high risk in setting of COVID and qualifies for extended VTE ppx on discharge, however in light of possible/likely bleeding 2/2 varices (pt refused evaluation of varices), will not discharge with xarelto, especially because there is high risk of GI bleeding.        2. Cirrhosis of liver with ascites.    Decompensated cirrhosis likely 2/2 alcohol use, c/b ascites, hepatic encephalopathy on admission (resolved)    - CT abd/pelvis with persistent large ascites and peritonitis; bilateral pleural effusions and pericardial effusion    - MELD-Na score 28    - s/p paracentesis on 4/10 with negative cytology but not sent for cell count/culture, however was on zosyn for 2 weeks so GI with no concerns for SBP     - repeat paracentesis 4/19, but unable to aspirate enough fluid?     - SAAG 1.4 consistent with portal hypertension as etiology of ascites    - continue rifaximin 550mg BID (changed from lactulose) for hepatic encephalopathy     - per GI, can restart lactulose and IV PPI BID can be changed to PO pantoprazole 40mg BID     - unclear if pt with varices; GI recommending outpatient EGD to evaluate if pt with varices     - pt refusing video capsule endoscopy this admission; explained risks of not undergoing procedure and pt understands and repeats back risks of refusing procedure.     - pt needs screening for HCC outpatient and follow up with hepatology outpatient        #r/o SBP     SAAG 1.4 consistent with portal hypertension as etiology of SBP     - ID following, appreciate recs     - ID recommending observe off abx (previously on meropenem 4/20-4/21)     - if GI concerned for active variceal bleed (not currently at this time), ID recommends restarting IV CTX 1g q24h ppx.            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     - s/p laparotomy with wound vac in place (gen surg following)     - ileostomy in place draining brown stool with no signs of bleeding    - needs ileostomy care outpatient            4. MEG (acute kidney injury). RESOLVING. Nonoliguric MEG 2/2 likey ATN, per nephrology. Per renal, likely combination of pre-renal, intrinsic cause 2/2 COVID-19 and contrast exposure on 4/10 vs vancomycin toxicity.     - previously on albumin, octreotide, and midodrine for albumin trial for possible hepatorenal syndrome, but without improvement in kidney function    - net even fluid balance     - renal recommending gentle IV hydration if high GI output    - FeNa 2.0%; FeUrea 63% intrinsic renal disease            5. Anemia. Normocytic anemia. Likely 2/2 anemia of chronic disease and kidney dysfunction vs acute blood loss. Hgb has been stable for the past couple of days with no evidence of acute GI bleeding     - s/p 4U pRBCs total this admission     - continue po pantoprazole 40mg BID     - monitor for signs of bleeding     - GI following and planned for video capsule endoscopy during admission, but pt refused.         Inpatient treatment course:     as above        New medications:     - sodium bicarbonate 650mg TID         Pt is high risk in setting of COVID and qualifies for extended VTE ppx on discharge, however in light of possible/likely bleeding 2/2 varices (pt refused evaluation of varices), will not discharge with xarelto, especially because there is high risk of GI bleeding. 60M PMH HTN, DM complicated by neuropathy, IVDU, ETOH liver cirrhosis, who presents to [facility] s/p fall at home. At [facility] patient reported that he takes lactulose at home daily and has daily BM, but he had no BM for 5 days prior to admission. 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 Patient underwent ex lap and subtotal colectomy, and ileostomy on 3/31. Pt had hyperglycemia at this time and started on insulin gtt briefly. He initially tested negative for COVID 19, but subsequent test was positive, patient was transferred to tele COVID unit; remained without need for supplemental oxygen.         Problem List/Main Diagnoses (system-based):     1. COVID-19 +     -s/p hydroxychloroquine and azithromycin 4/13-4/16    -stable respiratory status on RA        #hypercoagulable state 2/2 COVID     pt with high D-dimer to max 5000s    Pt is high risk in setting of COVID and qualifies for extended VTE ppx on discharge, however in light of possible/likely bleeding 2/2 varices (pt refused evaluation of varices), will not discharge with xarelto, especially because there is high risk of GI bleeding        2. Cirrhosis of liver with ascites.    Decompensated cirrhosis likely 2/2 alcohol use, c/b ascites, hepatic encephalopathy on admission (resolved)    - CT abd/pelvis with persistent large ascites and peritonitis; bilateral pleural effusions and pericardial effusion    - MELD-Na score 28    - s/p paracentesis on 4/10 with negative cytology but not sent for cell count/culture, however was on zosyn for 2 weeks so GI with no concerns for SBP     - repeat paracentesis 4/19, but unable to aspirate enough fluid    - SAAG 1.4 consistent with portal hypertension as etiology of ascites    - continue rifaximin 550mg BID (changed from lactulose) for hepatic encephalopathy     - per GI, can restart lactulose and IV PPI BID can be changed to PO pantoprazole 40mg BID     - unclear if pt with varices; GI recommending outpatient EGD to evaluate if pt with varices     - pt refusing video capsule endoscopy this admission; explained risks of not undergoing procedure and pt understands and repeats back risks of refusing procedure.     - pt needs screening for HCC outpatient and follow up with hepatology outpatient        #r/o SBP     SAAG 1.4 consistent with portal hypertension as etiology of SBP     - ID following, appreciate recs     - ID recommending observe off abx (previously on meropenem 4/20-4/21)     - if GI concerned for active variceal bleed (not currently at this time), ID recommends restarting IV CTX 1g q24h ppx.        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     - s/p laparotomy with wound vac in place (gen surg following)     - ileostomy in place draining brown stool with no signs of bleeding    - needs ileostomy care outpatient            4. MEG (acute kidney injury). RESOLVING. Nonoliguric MEG 2/2 likey ATN, per nephrology. Per renal, likely combination of pre-renal, intrinsic cause 2/2 COVID-19 and contrast exposure on 4/10 vs vancomycin toxicity.     - previously on albumin, octreotide, and midodrine for albumin trial for possible hepatorenal syndrome, but without improvement in kidney function    - net even fluid balance     - renal recommending gentle IV hydration if high GI output    - FeNa 2.0%; FeUrea 63% intrinsic renal disease            5. Anemia. Normocytic anemia. Likely 2/2 anemia of chronic disease and kidney dysfunction vs acute blood loss. Hgb has been stable for the past couple of days with no evidence of acute GI bleeding     - s/p 4U pRBCs total this admission     - continue po pantoprazole 40mg BID     - monitor for signs of bleeding     - GI following and planned for video capsule endoscopy during admission, but pt refused.         Inpatient treatment course:     as above        New medications:     - sodium bicarbonate 650mg TID         Pt is high risk in setting of COVID and qualifies for extended VTE ppx on discharge, however in light of possible/likely bleeding 2/2 varices (pt refused evaluation of varices), will not discharge with xarelto given high risk of GI bleeding. 60M PMH HTN, DM complicated by neuropathy, IVDU, ETOH liver cirrhosis, who presents to [facility] s/p fall at home. At [facility] patient reported that he takes lactulose at home daily and has daily BM, but he had no BM for 5 days prior to admission. 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 Patient underwent ex lap and subtotal colectomy, and ileostomy on 3/31. Pt had hyperglycemia at this time and started on insulin gtt briefly. He initially tested negative for COVID 19, but subsequent test was positive, patient was transferred to tele COVID unit; remained without need for supplemental oxygen.         Problem List/Main Diagnoses (system-based):     1. COVID-19 +     -s/p hydroxychloroquine and azithromycin 4/13-4/16    -stable respiratory status on RA        #hypercoagulable state 2/2 COVID     pt with high D-dimer to max 5000s    Pt is high risk in setting of COVID and qualifies for extended VTE ppx on discharge, however in light of possible/likely bleeding 2/2 varices (pt refused evaluation of varices), will not discharge with xarelto, especially because there is high risk of GI bleeding        2. Cirrhosis of liver with ascites.    Decompensated cirrhosis likely 2/2 alcohol use, c/b ascites, hepatic encephalopathy on admission (resolved)    - CT abd/pelvis with persistent large ascites and peritonitis; bilateral pleural effusions and pericardial effusion    - MELD-Na score 28    - s/p paracentesis on 4/10 with negative cytology but not sent for cell count/culture, however was on zosyn for 2 weeks so GI with no concerns for SBP     - repeat paracentesis 4/19, but unable to aspirate enough fluid    - SAAG 1.4 consistent with portal hypertension as etiology of ascites    - continue rifaximin 550mg BID (changed from lactulose) for hepatic encephalopathy     - per GI, can restart lactulose and IV PPI BID can be changed to PO pantoprazole 40mg BID     - unclear if pt with varices; GI recommending outpatient EGD to evaluate if pt with varices     - pt refusing video capsule endoscopy this admission; explained risks of not undergoing procedure and pt understands and repeats back risks of refusing procedure.     - pt needs screening for HCC outpatient and follow up with hepatology outpatient        #r/o SBP     SAAG 1.4 consistent with portal hypertension as etiology of SBP     - ID following, appreciate recs     - ID recommending observe off abx (previously on meropenem 4/20-4/21)     - if GI concerned for active variceal bleed (not currently at this time), ID recommends restarting IV CTX 1g q24h ppx.        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     - s/p laparotomy with wound vac in place (gen surg following)     - ileostomy in place draining brown stool with no signs of bleeding    - needs ileostomy care outpatient            4. MEG (acute kidney injury). RESOLVING. Nonoliguric MEG 2/2 likey ATN, per nephrology. Per renal, likely combination of pre-renal, intrinsic cause 2/2 COVID-19 and contrast exposure on 4/10 vs vancomycin toxicity.     - previously on albumin, octreotide, and midodrine for albumin trial for possible hepatorenal syndrome, but without improvement in kidney function    - net even fluid balance     - renal recommending gentle IV hydration if high GI output    - FeNa 2.0%; FeUrea 63% intrinsic renal disease            5. Anemia. Normocytic anemia. Likely 2/2 anemia of chronic disease and kidney dysfunction vs acute blood loss. Hgb has been stable for the past couple of days with no evidence of acute GI bleeding     - s/p 4U pRBCs total this admission     - continue po pantoprazole 40mg BID     - monitor for signs of bleeding     - GI following and planned for video capsule endoscopy during admission, but pt refused.         6. DM    - patient to continue on glimepiride 1mg qd and Metformin 1000mg bid        Inpatient treatment course:     as above        New medications:     - sodium bicarbonate 650mg TID         Pt is high risk in setting of COVID and qualifies for extended VTE ppx on discharge, however in light of possible/likely bleeding 2/2 varices (pt refused evaluation of varices), will not discharge with xarelto given high risk of GI bleeding. 60M PMH HTN, DM complicated by neuropathy, IVDU, ETOH liver cirrhosis, who presents to [facility] s/p fall at home. At [facility] patient reported that he takes lactulose at home daily and has daily BM, but he had no BM for 5 days prior to admission. 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 Patient underwent ex lap and subtotal colectomy, and ileostomy on 3/31. Pt had hyperglycemia at this time and started on insulin gtt briefly. He initially tested negative for COVID 19, but subsequent test was positive, patient was transferred to tele COVID unit; remained without need for supplemental oxygen.         Problem List/Main Diagnoses (system-based):     1. COVID-19 +     -s/p hydroxychloroquine and azithromycin 4/13-4/16    -stable respiratory status on RA        #hypercoagulable state 2/2 COVID     pt with high D-dimer to max 5000s    Pt is high risk in setting of COVID and qualifies for extended VTE ppx on discharge, however in light of possible/likely bleeding 2/2 varices (pt refused evaluation of varices), will not discharge with xarelto, especially because there is high risk of GI bleeding        2. Cirrhosis of liver with ascites.    Decompensated cirrhosis likely 2/2 alcohol use, c/b ascites, hepatic encephalopathy on admission (resolved)    - CT abd/pelvis with persistent large ascites and peritonitis; bilateral pleural effusions and pericardial effusion    - MELD-Na score 28    - s/p paracentesis on 4/10 with negative cytology but not sent for cell count/culture, however was on zosyn for 2 weeks so GI with no concerns for SBP     - repeat paracentesis 4/19, but unable to aspirate enough fluid    - SAAG 1.4 consistent with portal hypertension as etiology of ascites    - continue rifaximin 550mg BID (changed from lactulose) for hepatic encephalopathy     - per GI, can restart lactulose and IV PPI BID can be changed to PO pantoprazole 40mg BID     - unclear if pt with varices; GI recommending outpatient EGD to evaluate if pt with varices     - pt refusing video capsule endoscopy this admission; explained risks of not undergoing procedure and pt understands and repeats back risks of refusing procedure.     - pt needs screening for HCC outpatient and follow up with hepatology outpatient        #r/o SBP     SAAG 1.4 consistent with portal hypertension as etiology of SBP     - ID following, appreciate recs     - ID recommending observe off abx (previously on meropenem 4/20-4/21)     - if GI concerned for active variceal bleed (not currently at this time), ID recommends restarting IV CTX 1g q24h ppx.        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     - s/p laparotomy with wound vac in place (gen surg following)     - ileostomy in place draining brown stool with no signs of bleeding    - needs ileostomy care outpatient            4. MEG (acute kidney injury). RESOLVING. Nonoliguric MEG 2/2 likey ATN, per nephrology. Per renal, likely combination of pre-renal, intrinsic cause 2/2 COVID-19 and contrast exposure on 4/10 vs vancomycin toxicity.     - previously on albumin, octreotide, and midodrine for albumin trial for possible hepatorenal syndrome, but without improvement in kidney function    - net even fluid balance     - renal recommending gentle IV hydration if high GI output    - FeNa 2.0%; FeUrea 63% intrinsic renal disease            5. Anemia. Normocytic anemia. Likely 2/2 anemia of chronic disease and kidney dysfunction vs acute blood loss. Hgb has been stable for the past couple of days with no evidence of acute GI bleeding     - s/p 4U pRBCs total this admission     - continue po pantoprazole 40mg BID     - monitor for signs of bleeding     - GI following and planned for video capsule endoscopy during admission, but pt refused.         6. DM    - patient to continue on glimepiride 1mg qd and Metformin 1000mg bid        Inpatient treatment course:     as above            Pt is high risk in setting of COVID and qualifies for extended VTE ppx on discharge, however in light of possible/likely bleeding 2/2 varices (pt refused evaluation of varices), will not discharge with Xarelto given high risk of GI bleeding. 60M PMH HTN, DM c/b neuropathy, IVDU, ETOH cirrhosis presenting to  [facility] s/p fall at home, found with toxic megacolon with pneumatosis for which patient underwent ex-lap and subtotal colectomy + ileostomy on 3/31/2020. Patient initially with negative COVID PCR; subsequent COVID PCR resulted positive; transferred to COVID unit where patient remained stable without supplemental oxygen requirements. Patient medically optimized and deemed stable for discharge; however given ostomy care requirements, lack of insurance, and COVID status, discharge delayed pending placement.        Problem List/Main Diagnoses:     #COVID-19 +     3/31 COVID PCR negative    4/13 COVID PCR positive    5/23 COVID PCR negative    -s/p hydroxychloroquine and azithromycin 4/13-4/16    -patient remained with stable respiratory status on RA        #hypercoagulable state, 2/2 COVID     D-dimer to max 5000s    -patient qualitifes for extended VTE prophylaxis on discharge, however given possible GI bleed (patient refused further GI evaluation as below), will not discharge with VTE prophylaxis        #toxic megacolon    s/p ex-lap with subtotal colectomy + ileostomy 3/31; course c/b purulent wound infection (4/9), treated with vanc (4/9-4/13) and zosyn     -ileostomy in place draining brown stool with no signs of bleeding    -ex-lap wound now without s/s infection s/p ABX as above    -will receive continued ileostomy care as outpatient        #cirrhosis with ascites, 2/2 ETOH absue    Poa with decompensated cirrhosis likely 2/2 alcohol use, c/b ascites, hepatic encephalopathy (resolved)    -CT abd/pelvis with persistent large ascites and peritonitis; bilateral pleural effusions and pericardial effusion    -paracentesis on 4/10 with negative cytology; repeat paracentesis 4/19 with insufficient fluid aspiration    -SAAG 1.4 consistent with portal hypertension as etiology of ascites    -Rifaximin 550mg BID (changed from lactulose) for hepatic encephalopathy     -pt refusing video capsule endoscopy this admission for evaluation of possible varices; patient demonstrates understanding of risks of refusing procedure        #MEG, nonoliguric; likely 2/2 ATN in setting of COVID and vancomycin toxicitiy    -low concern for hepatorenal syndrome as prior management with albumin, octreotide, and midodrine did not demonstrate improvement in kidney function    -close monitoring of fluid status    -FeNa 2.0%; FeUrea 63%  consistent with intrinsic renal disease        #hyponatremia; Na 129 stable in setting of cirrhosis    -renal following with recommendations appreciated throughout hospital course    -salt tabs 1g tid    -fluid restriction 1L        #Anemia, normocytic; likely 2/2 anemia of chronic disease/CKD vs less likely acute GI bleed     -Hgb has been stable x several weeks with no evidence of acute GI bleeding     -s/p 7U pRBCs total during this admission     -pantoprazole 40mg po BID     -pt refusing video capsule endoscopy this admission for evaluation of possible varices/GI bleed; patient demonstrates understanding of risks of refusing procedure        #DM-II    -endocrinology following with recommendations appreciated throughout hospital course    -discharge regimen as follows:     -______________ 60M PMH HTN, DM c/b neuropathy, IVDU, ETOH cirrhosis presenting to  [facility] s/p fall at home, found with toxic megacolon with pneumatosis for which patient underwent ex-lap and subtotal colectomy + ileostomy on 3/31/2020. Patient initially with negative COVID PCR; subsequent COVID PCR resulted positive; transferred to COVID unit where patient remained stable without supplemental oxygen requirements. Patient medically optimized and deemed stable for discharge; however given ostomy care requirements, lack of insurance, and COVID status, discharge delayed pending placement.        Problem List/Main Diagnoses:     #COVID-19 +     3/31 COVID PCR negative    4/13 COVID PCR positive    5/23 COVID PCR negative    -s/p hydroxychloroquine and azithromycin 4/13-4/16    -patient remained with stable respiratory status on RA        #hypercoagulable state, 2/2 COVID     D-dimer to max 5000s    -patient qualitifes for extended VTE prophylaxis on discharge, however given possible GI bleed (patient refused further GI evaluation as below), will not discharge with VTE prophylaxis        #toxic megacolon    s/p ex-lap with subtotal colectomy + ileostomy 3/31; course c/b purulent wound infection (4/9), treated with vanc (4/9-4/13) and zosyn     -ileostomy in place draining brown stool with no signs of bleeding    -ex-lap wound now without s/s infection s/p ABX as above    -will receive continued ileostomy care as outpatient        #cirrhosis with ascites, 2/2 ETOH absue    Poa with decompensated cirrhosis likely 2/2 alcohol use, c/b ascites, hepatic encephalopathy (resolved)    -CT abd/pelvis with persistent large ascites and peritonitis; bilateral pleural effusions and pericardial effusion    -paracentesis on 4/10 with negative cytology; repeat paracentesis 4/19 with insufficient fluid aspiration    -SAAG 1.4 consistent with portal hypertension as etiology of ascites    -Rifaximin 550mg BID (changed from lactulose) for hepatic encephalopathy     -pt refusing video capsule endoscopy this admission for evaluation of possible varices; patient demonstrates understanding of risks of refusing procedure        #MEG, nonoliguric; likely 2/2 ATN in setting of COVID and vancomycin toxicitiy    -low concern for hepatorenal syndrome as prior management with albumin, octreotide, and midodrine did not demonstrate improvement in kidney function    -close monitoring of fluid status    -FeNa 2.0%; FeUrea 63%  consistent with intrinsic renal disease        #hyponatremia; Na 129 stable in setting of cirrhosis    -renal following with recommendations appreciated throughout hospital course    -salt tabs 1g tid    -fluid restriction 1L        #Anemia, normocytic; likely 2/2 anemia of chronic disease/CKD vs less likely acute GI bleed     -Hgb has been stable x several weeks with no evidence of acute GI bleeding     -s/p 7U pRBCs total during this admission     -pantoprazole 40mg po BID     -pt refusing video capsule endoscopy this admission for evaluation of possible varices/GI bleed; patient demonstrates understanding of risks of refusing procedure        #DM-II    -endocrinology following with recommendations appreciated throughout hospital course    -discharge regimen as follows:     -Metformin 1000 mg BID    -Glimepiride 1 mg before breakfast 60M PMH HTN, DM c/b neuropathy, IVDU, ETOH cirrhosis presenting to  [facility] s/p fall at home, found with toxic megacolon with pneumatosis for which patient underwent ex-lap and subtotal colectomy + ileostomy on 3/31/2020. Patient initially with negative COVID PCR; subsequent COVID PCR resulted positive; transferred to COVID unit where patient remained stable without supplemental oxygen requirements. Patient medically optimized and deemed stable for discharge; however given ostomy care requirements, lack of insurance, and COVID status, discharge delayed pending placement.        Problem List/Main Diagnoses    #COVID-19 +     3/31 COVID PCR negative    4/13 COVID PCR positive    5/23 COVID PCR negative    -s/p hydroxychloroquine and azithromycin 4/13-4/16    -patient remained with stable respiratory status on RA        #hypercoagulable state, 2/2 COVID     D-dimer to max 5000s    -patient qualitifes for extended VTE prophylaxis on discharge, however given possible GI bleed (patient refused further GI evaluation as below), will not discharge with VTE prophylaxis        #toxic megacolon    s/p ex-lap with subtotal colectomy + ileostomy 3/31; course c/b purulent wound infection (4/9), treated with vanc (4/9-4/13) and zosyn     -ileostomy in place draining brown stool with no signs of bleeding    -ex-lap wound now without s/s infection s/p ABX as above    -will receive continued ileostomy care as outpatient        #cirrhosis with ascites, 2/2 ETOH absue    Poa with decompensated cirrhosis likely 2/2 alcohol use, c/b ascites, hepatic encephalopathy (resolved)    -CT abd/pelvis with persistent large ascites and peritonitis; bilateral pleural effusions and pericardial effusion    -paracentesis on 4/10 with negative cytology; repeat paracentesis 4/19 with insufficient fluid aspiration    -SAAG 1.4 consistent with portal hypertension as etiology of ascites    -Rifaximin 550mg BID (changed from lactulose) for hepatic encephalopathy     -pt refusing video capsule endoscopy this admission for evaluation of possible varices; patient demonstrates understanding of risks of refusing procedure        #MEG, nonoliguric; likely 2/2 ATN in setting of COVID and vancomycin toxicitiy    -low concern for hepatorenal syndrome as prior management with albumin, octreotide, and midodrine did not demonstrate improvement in kidney function    -close monitoring of fluid status    -FeNa 2.0%; FeUrea 63%  consistent with intrinsic renal disease        #hyponatremia; Na 129 stable in setting of cirrhosis    -renal following with recommendations appreciated throughout hospital course    -salt tabs 1g tid    -fluid restriction 1L        #Anemia, normocytic; likely 2/2 anemia of chronic disease/CKD vs less likely acute GI bleed     -Hgb has been stable x several weeks with no evidence of acute GI bleeding     -s/p 7U pRBCs total during this admission     -pantoprazole 40mg po BID     -pt refusing video capsule endoscopy this admission for evaluation of possible varices/GI bleed; patient demonstrates understanding of risks of refusing procedure        #DM-II    -endocrinology following with recommendations appreciated throughout hospital course    -discharge regimen as follows: ***    -Metformin 1000 mg BID    -Glimepiride 1 mg before breakfast 60M PMH HTN, DM c/b neuropathy, IVDU, ETOH cirrhosis presenting to  [facility] s/p fall at home, found with toxic megacolon with pneumatosis for which patient underwent ex-lap and subtotal colectomy + ileostomy on 3/31/2020. Patient initially with negative COVID PCR; subsequent COVID PCR resulted positive; transferred to COVID unit where patient remained stable without supplemental oxygen requirements. Patient medically optimized and deemed stable for discharge; however given ostomy care requirements, lack of insurance, and COVID status, discharge delayed pending placement.        Problem List/Main Diagnoses    #COVID-19 +     3/31 COVID PCR negative    4/13 COVID PCR positive    5/23 COVID PCR negative    -s/p hydroxychloroquine and azithromycin 4/13-4/16    -patient remained with stable respiratory status on RA        #hypercoagulable state 2/2 COVID     D-dimer to max 5000s    -patient qualitifes for extended VTE prophylaxis on discharge, however given possible GI bleed (patient refused further GI evaluation as below), will not discharge with VTE prophylaxis        #toxic megacolon    s/p ex-lap with subtotal colectomy + ileostomy 3/31; course c/b purulent wound infection (4/9), treated with vanc (4/9-4/13) and zosyn     -ileostomy in place draining brown stool with no signs of bleeding    -ex-lap wound now without s/s infection s/p ABX as above    -will receive continued ileostomy care as outpatient        #cirrhosis with ascites, 2/2 ETOH absue    Poa with decompensated cirrhosis likely 2/2 alcohol use, c/b ascites, hepatic encephalopathy (resolved)    -CT abd/pelvis with persistent large ascites and peritonitis; bilateral pleural effusions and pericardial effusion    -paracentesis on 4/10 with negative cytology; repeat paracentesis 4/19 with insufficient fluid aspiration    -SAAG 1.4 consistent with portal hypertension as etiology of ascites    -Rifaximin 550mg BID (changed from lactulose) for hepatic encephalopathy     -pt refusing video capsule endoscopy this admission for evaluation of possible varices; patient demonstrates understanding of risks of refusing procedure        #MEG, nonoliguric; likely 2/2 ATN in setting of COVID and vancomycin toxicitiy    -low concern for hepatorenal syndrome as prior management with albumin, octreotide, and midodrine did not demonstrate improvement in kidney function    -close monitoring of fluid status    -FeNa 2.0%; FeUrea 63%  consistent with intrinsic renal disease        #hyponatremia; Na 129 stable in setting of cirrhosis    -renal following with recommendations appreciated throughout hospital course    -salt tabs 1g tid    -fluid restriction 1L        #Anemia, normocytic; likely 2/2 anemia of chronic disease/CKD vs less likely acute GI bleed     -Hgb has been stable x several weeks with no evidence of acute GI bleeding     -s/p 7U pRBCs total during this admission     -pantoprazole 40mg po BID     -pt refusing video capsule endoscopy this admission for evaluation of possible varices/GI bleed; patient demonstrates understanding of risks of refusing procedure        #DM-II    -endocrinology following with recommendations appreciated throughout hospital course    -discharge regimen as follows: ***    -Metformin 1000 mg BID    -Glimepiride 1 mg before breakfast        Exam to follow up: basic exam, abdominal (ostomy, surgical scar healing)    Labs to follow up: basic labs (especially sodium, bicarb) 60M PMH HTN, DM c/b neuropathy, IVDU, ETOH cirrhosis presenting to  [facility] s/p fall at home, found with toxic megacolon with pneumatosis for which patient underwent ex-lap and subtotal colectomy + ileostomy on 3/31/2020. Patient initially with negative COVID PCR; subsequent COVID PCR resulted positive; transferred to COVID unit where patient remained stable without supplemental oxygen requirements. Patient medically optimized and deemed stable for discharge; however given ostomy care requirements, lack of insurance, and COVID status, discharge delayed pending placement.        Problem List/Main Diagnoses    #COVID-19 +     3/31 COVID PCR negative    4/13 COVID PCR positive    5/23 COVID PCR negative    -s/p hydroxychloroquine and azithromycin 4/13-4/16    -patient remained with stable respiratory status on RA        #hypercoagulable state 2/2 COVID     D-dimer to max 5000s    -patient qualitifes for extended VTE prophylaxis on discharge, however given possible GI bleed (patient refused further GI evaluation as below), will not discharge with VTE prophylaxis        #toxic megacolon    s/p ex-lap with subtotal colectomy + ileostomy 3/31; course c/b purulent wound infection (4/9), treated with vanc (4/9-4/13) and zosyn     -ileostomy in place draining brown stool with no signs of bleeding    -ex-lap wound now without s/s infection s/p ABX as above    -will receive continued ileostomy care as outpatient        #cirrhosis with ascites, 2/2 ETOH absue    Poa with decompensated cirrhosis likely 2/2 alcohol use, c/b ascites, hepatic encephalopathy (resolved)    -CT abd/pelvis with persistent large ascites and peritonitis; bilateral pleural effusions and pericardial effusion    -paracentesis on 4/10 with negative cytology; repeat paracentesis 4/19 with insufficient fluid aspiration    -SAAG 1.4 consistent with portal hypertension as etiology of ascites    -Rifaximin 550mg BID (changed from lactulose) for hepatic encephalopathy     -pt refusing video capsule endoscopy this admission for evaluation of possible varices; patient demonstrates understanding of risks of refusing procedure        #MEG, nonoliguric; likely 2/2 ATN in setting of COVID and vancomycin toxicitiy    -low concern for hepatorenal syndrome as prior management with albumin, octreotide, and midodrine did not demonstrate improvement in kidney function    -close monitoring of fluid status    -FeNa 2.0%; FeUrea 63%  consistent with intrinsic renal disease        #hyponatremia; Na 129 stable in setting of cirrhosis    -renal following with recommendations appreciated throughout hospital course    -salt tabs 1g tid    -fluid restriction 1L        #Anemia, normocytic; likely 2/2 anemia of chronic disease/CKD vs less likely acute GI bleed     -Hgb has been stable x several weeks with no evidence of acute GI bleeding     -s/p 7U pRBCs total during this admission     -pantoprazole 40mg po BID     -pt refusing video capsule endoscopy this admission for evaluation of possible varices/GI bleed; patient demonstrates understanding of risks of refusing procedure        #DM-II    -endocrinology following with recommendations appreciated throughout hospital course    -discharge regimen as follows: ***            Exam to follow up: basic exam, abdominal (ostomy, surgical scar healing)    Labs to follow up: basic labs (especially sodium, bicarb)        Hospital Course    60M with PMH of HTN, DM complicated by neuropathy, IVDU, ETOH liver cirrhosis, who presents to [facility] for fall at home. At [facility] patient reported that he takes lactulose at home daily and has daily BM, but he had no BM for 5 days prior to admission. 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 Patient underwent ex lap and subtotal colectomy, and ileostomy on 3/31. Pt had hyperglycemia at this time and started on insulin gtt briefly. He initially tested negative for COVID 19, but subsequent test was positive, patient was transferred to tele COVID unit without need for supplemental oxygen.         Pt had issues with hypertension during admission and remains on amlodipine. Consulted GI to r/o hepatorenal syndrome. Pt underwent paracentesis (w/o micro) on 4/13 with IR and results were negative. On 4/15 renal consulted to w/u MEG and recommending no IVF due to low sodium. Concerned for metabolic acidosis and started on sodium bicarb TID. General surgery followed patient and managed wound vac. Required blood transfusion for low hemoglobin. AC was resumed due to stable H/H. On 4/19 pt noted to be febrile to 101.7 with increasing WBC count and distended abdomen. Ruled patient out for C. diff. Patient stepped down to RMF on 4/21. On RMF, managed hyponatremia (resolved), HTN (well-controlled on current regimen), diabetes (per Endocrine) and ostomy output (discontinued Lactulose and started Loperamide). Patient with dark stool but refused further work-up for GIB. Did not have any further episodes of melena and had stable hemoglobin. Patient deemed to be stable for discharge on 4/23 but because of insurance and citizenship discharge was delayed. He has been stable on room air 60M PMH HTN, DM c/b neuropathy, IVDU, ETOH cirrhosis presenting to  [facility] s/p fall at home, found with toxic megacolon with pneumatosis for which patient underwent ex-lap and subtotal colectomy + ileostomy on 3/31/2020. Patient initially with negative COVID PCR; subsequent COVID PCR resulted positive; transferred to COVID unit where patient remained stable without supplemental oxygen requirements. Patient medically optimized and deemed stable for discharge; however given ostomy care requirements, lack of insurance, and COVID status, discharge delayed pending placement.        Problem List/Main Diagnoses    #COVID-19 +     3/31 COVID PCR negative    4/13 COVID PCR positive    5/23 COVID PCR negative    -s/p hydroxychloroquine and azithromycin 4/13-4/16    -patient remained with stable respiratory status on RA        #hypercoagulable state 2/2 COVID     D-dimer to max 5000s    -patient qualitifes for extended VTE prophylaxis on discharge, however given possible GI bleed (patient refused further GI evaluation as below), will not discharge with VTE prophylaxis        #toxic megacolon    s/p ex-lap with subtotal colectomy + ileostomy 3/31; course c/b purulent wound infection (4/9), treated with vanc (4/9-4/13) and zosyn     -ileostomy in place draining brown stool with no signs of bleeding    -ex-lap wound now without s/s infection s/p ABX as above    -will receive continued ileostomy care as outpatient        #cirrhosis with ascites, 2/2 ETOH absue    Poa with decompensated cirrhosis likely 2/2 alcohol use, c/b ascites, hepatic encephalopathy (resolved)    -CT abd/pelvis with persistent large ascites and peritonitis; bilateral pleural effusions and pericardial effusion    -paracentesis on 4/10 with negative cytology; repeat paracentesis 4/19 with insufficient fluid aspiration    -SAAG 1.4 consistent with portal hypertension as etiology of ascites    -Rifaximin 550mg BID (changed from lactulose) for hepatic encephalopathy     -pt refusing video capsule endoscopy this admission for evaluation of possible varices; patient demonstrates understanding of risks of refusing procedure        #MEG, nonoliguric; likely 2/2 ATN in setting of COVID and vancomycin toxicitiy    -low concern for hepatorenal syndrome as prior management with albumin, octreotide, and midodrine did not demonstrate improvement in kidney function    -close monitoring of fluid status    -FeNa 2.0%; FeUrea 63%  consistent with intrinsic renal disease        #hyponatremia; Na 129 stable in setting of cirrhosis    -renal following with recommendations appreciated throughout hospital course    -salt tabs 1g tid    -fluid restriction 1L        #Anemia, normocytic; likely 2/2 anemia of chronic disease/CKD vs less likely acute GI bleed     -Hgb has been stable x several weeks with no evidence of acute GI bleeding     -s/p 7U pRBCs total during this admission     -pantoprazole 40mg po BID     -pt refusing video capsule endoscopy this admission for evaluation of possible varices/GI bleed; patient demonstrates understanding of risks of refusing procedure        #DM-II    -endocrinology following with recommendations appreciated throughout hospital course    -discharge regimen as follows:     Lantus 24 units    Lispro 10 units pre-meal            Exam to follow up: basic exam, abdominal (ostomy, surgical scar healing)    Labs to follow up: basic labs (especially sodium, bicarb)        Hospital Course    60M with PMH of HTN, DM complicated by neuropathy, IVDU, ETOH liver cirrhosis, who presents to [facility] for fall at home. At [facility] patient reported that he takes lactulose at home daily and has daily BM, but he had no BM for 5 days prior to admission. 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 Patient underwent ex lap and subtotal colectomy, and ileostomy on 3/31. Pt had hyperglycemia at this time and started on insulin gtt briefly. He initially tested negative for COVID 19, but subsequent test was positive, patient was transferred to tele COVID unit without need for supplemental oxygen.         Pt had issues with hypertension during admission and remains on amlodipine. Consulted GI to r/o hepatorenal syndrome. Pt underwent paracentesis (w/o micro) on 4/13 with IR and results were negative. On 4/15 renal consulted to w/u MEG and recommending no IVF due to low sodium. Concerned for metabolic acidosis and started on sodium bicarb TID. General surgery followed patient and managed wound vac. Required blood transfusion for low hemoglobin. AC was resumed due to stable H/H. On 4/19 pt noted to be febrile to 101.7 with increasing WBC count and distended abdomen. Ruled patient out for C. diff. Patient stepped down to RMF on 4/21. On RMF, managed hyponatremia (resolved), HTN (well-controlled on current regimen), diabetes (per Endocrine) and ostomy output (discontinued Lactulose and started Loperamide). Patient with dark stool but refused further work-up for GIB. Did not have any further episodes of melena and had stable hemoglobin. Patient deemed to be stable for discharge on 4/23 but because of insurance and citizenship discharge was delayed. He has been stable on room air 60M PMH HTN, DM c/b neuropathy, IVDU, ETOH cirrhosis presenting to  [facility] s/p fall at home, found with toxic megacolon with pneumatosis for which patient underwent ex-lap and subtotal colectomy + ileostomy on 3/31/2020. Patient initially with negative COVID PCR; subsequent COVID PCR resulted positive; transferred to COVID unit where patient remained stable without supplemental oxygen requirements. Patient medically optimized and deemed stable for discharge; however given ostomy care requirements, lack of insurance, and COVID status, discharge delayed pending placement.        Problem List/Main Diagnoses    #COVID-19 +     3/31 COVID PCR negative    4/13 COVID PCR positive    5/23 COVID PCR negative    -s/p hydroxychloroquine and azithromycin 4/13-4/16    -patient remained with stable respiratory status on RA        #hypercoagulable state 2/2 COVID     D-dimer to max 5000s    -patient qualitifes for extended VTE prophylaxis on discharge, however given possible GI bleed (patient refused further GI evaluation as below), will not discharge with VTE prophylaxis        #toxic megacolon    s/p ex-lap with subtotal colectomy + ileostomy 3/31; course c/b purulent wound infection (4/9), treated with vanc (4/9-4/13) and zosyn     -ileostomy in place draining brown stool with no signs of bleeding    -ex-lap wound now without s/s infection s/p ABX as above    -will receive continued ileostomy care as outpatient        #cirrhosis with ascites, 2/2 ETOH absue    Poa with decompensated cirrhosis likely 2/2 alcohol use, c/b ascites, hepatic encephalopathy (resolved)    -CT abd/pelvis with persistent large ascites and peritonitis; bilateral pleural effusions and pericardial effusion    -paracentesis on 4/10 with negative cytology; repeat paracentesis 4/19 with insufficient fluid aspiration    -SAAG 1.4 consistent with portal hypertension as etiology of ascites    -Rifaximin 550mg BID (changed from lactulose) for hepatic encephalopathy     -pt refusing video capsule endoscopy this admission for evaluation of possible varices; patient demonstrates understanding of risks of refusing procedure        #MEG, nonoliguric; likely 2/2 ATN in setting of COVID and vancomycin toxicitiy    -low concern for hepatorenal syndrome as prior management with albumin, octreotide, and midodrine did not demonstrate improvement in kidney function    -close monitoring of fluid status    -FeNa 2.0%; FeUrea 63%  consistent with intrinsic renal disease        #hyponatremia; Na 129 stable in setting of cirrhosis    -renal following with recommendations appreciated throughout hospital course    -salt tabs 1g tid    -fluid restriction 1L        #Anemia, normocytic; likely 2/2 anemia of chronic disease/CKD vs less likely acute GI bleed     -Hgb has been stable x several weeks with no evidence of acute GI bleeding     -s/p 7U pRBCs total during this admission     -pantoprazole 40mg po BID     -pt refusing video capsule endoscopy this admission for evaluation of possible varices/GI bleed; patient demonstrates understanding of risks of refusing procedure        #DM-II    -endocrinology following with recommendations appreciated throughout hospital course    -discharge regimen as follows:     Lantus 24 units    Lispro 10 units pre-meal            Exam to follow up: basic exam, abdominal (ostomy, surgical scar healing)    Labs to follow up: basic labs (especially sodium, bicarb)        Hospital Course    60M with PMH of HTN, DM complicated by neuropathy, IVDU, ETOH liver cirrhosis, who presents to [facility] for fall at home. At [facility] patient reported that he takes lactulose at home daily and has daily BM, but he had no BM for 5 days prior to admission. 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 Patient underwent ex lap and subtotal colectomy, and ileostomy on 3/31. Pt had hyperglycemia at this time and started on insulin gtt briefly. He initially tested negative for COVID 19, but subsequent test was positive, patient was transferred to tele COVID unit without need for supplemental oxygen.         Pt had issues with hypertension during admission and remains on amlodipine. Consulted GI to r/o hepatorenal syndrome. Pt underwent paracentesis (w/o micro) on 4/13 with IR and results were negative. On 4/15 renal consulted to w/u MEG and recommending no IVF due to low sodium. Concerned for metabolic acidosis and started on sodium bicarb TID. General surgery followed patient and managed wound vac. Required blood transfusion for low hemoglobin. AC was resumed due to stable H/H. On 4/19 pt noted to be febrile to 101.7 with increasing WBC count and distended abdomen. Ruled patient out for C. diff. Patient stepped down to RMF on 4/21. On RMF, managed hyponatremia (resolved), HTN (well-controlled on current regimen), diabetes (per Endocrine) and ostomy output (discontinued Lactulose and started Loperamide). Patient with dark stool but refused further work-up for GIB. Did not have any further episodes of melena and had stable hemoglobin. Patient deemed to be stable for discharge on 4/23 but because of insurance and citizenship discharge was delayed. He has been stable on room air         Wound Care    Seen frequently by wound care nurse during admission. Per their instructions prior to discharge:    -Angi Appliance 2 3/4"    -Change pouch 2x/week and as needed    -only change pouch if the entire appliance needs to be changed    -Patient at the time of discharge has: 1 piece appliances, 2 piece appliances, stoma rings, and skin barrier wipes    -Patient did need small amount of help with applying dressing around stoma, but was able to cut the skin barrier independently

## 2020-04-22 NOTE — PROGRESS NOTE ADULT - ASSESSMENT
61 y/o M with PMH of HTN, DM complicated by neuropathy, EtOH liver cirrhosis, presenting w/ pneumatosis of unknown etiology, s/p sub total colectomy and end ileostomy on 3/31, c/b fever and purulent wound infection (4/9) now s/p paracentesis 4/13    Neuro: Pain/Nausea control  CV: HDS but hypertensive, lisinopril 40 mg and amlodipine 10mg, Labetalol 20 mg PRN BP >170, hydralazine 10 mg prn  Pulm: CED RAMIREZ: NPO  : Voids  ID: Zosyn (4/9-), vanc (4/9-4/13), azithro 4/13-4/16, plaquenil 4/13-4/16  Endo: ISS, lantus 6, synthroid  PPx: SCDs, SQH, OOBA  PT/OT: initial evaluation 4/3  Wounds: Vac in place, change today 59 y/o M with PMH of HTN, DM complicated by neuropathy, EtOH liver cirrhosis, presenting w/ pneumatosis of unknown etiology, s/p sub total colectomy and end ileostomy on 3/31, c/b fever and purulent wound infection (4/9) now s/p paracentesis 4/13    Neuro: Pain/Nausea control  CV: HDS but hypertensive, lisinopril 40 mg and amlodipine 10mg, Labetalol 20 mg PRN BP >170, hydralazine 10 mg prn  Pulm: CED RAMIREZ: NPO  : Voids  ID: Zosyn (4/9-), vanc (4/9-4/13), azithro 4/13-4/16, plaquenil 4/13-4/16  Endo: ISS, lantus 6, synthroid  PPx: SCDs, SQH, OOBA  PT/OT: initial evaluation 4/3  Wounds: Vac in place, changes Tues/Thurs/Sat

## 2020-04-22 NOTE — PROGRESS NOTE ADULT - PROBLEM SELECTOR PLAN 6
Type 2 DM, endocrinology following   - continue lantus 6U qhs   - low dose SSI   - FSG goal 140-180    #hypothyroidism   - TSH 2.813; TPO and antithyroid Abs neg   - switch IV levothyroxine 50mcg to po today   - appreciate endocrinology recs

## 2020-04-22 NOTE — PROGRESS NOTE ADULT - PROBLEM SELECTOR PLAN 2
Decompensated cirrhosis likely 2/2 alcohol use, c/b ascites, hepatic encephalopathy on admission (resolved)  - MELD-Na score 28  - s/p paracentesis on 4/10 with negative cytology but not sent for cell count/culture, however was on zosyn for 2 weeks so GI with no concerns for SBP   - repeat paracentesis 4/19, but unable to aspirate enough fluid?   - continue rifaximin 550mg BID (changed from lactulose) for hepatic encephalopathy   - unclear if pt with varices; GI recommending outpatient EGD to evaluate if pt with varices   - pt refusing video capsule endoscopy this admission; explained risks of not undergoing procedure and pt understands and repeats back risks of refusing procedure.   - pt needs screening for HCC outpatient Decompensated cirrhosis likely 2/2 alcohol use, c/b ascites, hepatic encephalopathy on admission (resolved)  - CT abd/pelvis with persistent large ascites and peritonitis; bilateral pleural effusions and pericardial effusion  - MELD-Na score 28  - s/p paracentesis on 4/10 with negative cytology but not sent for cell count/culture, however was on zosyn for 2 weeks so GI with no concerns for SBP   - repeat paracentesis 4/19, but unable to aspirate enough fluid?   - SAAG 1.4 consistent with portal hypertension as etiology of ascites  - continue rifaximin 550mg BID (changed from lactulose) for hepatic encephalopathy   - per GI, can restart lactulose and IV PPI BID can be changed to PO pantoprazole 40mg BID   - unclear if pt with varices; GI recommending outpatient EGD to evaluate if pt with varices   - pt refusing video capsule endoscopy this admission; explained risks of not undergoing procedure and pt understands and repeats back risks of refusing procedure.   - pt needs screening for HCC outpatient and follow up with hepatology outpatient    #r/o SBP   SAAG 1.4 consistent with portal hypertension as etiology of SBP   - ID following, appreciate recs   - ID recommending observe off abx (previously on meropenem 4/20-4/21)   - if GI concerned for active variceal bleed (not currently at this time), ID recommends restarting IV CTX 1g q24h ppx

## 2020-04-22 NOTE — DISCHARGE NOTE PROVIDER - NSFOLLOWUPCLINICS_GEN_ALL_ED_FT
[facility]  Family Medicine  [facility]. [address]  [place], [place] [facility]  Phone: [phone]  Fax:   Follow Up Time:

## 2020-04-22 NOTE — DISCHARGE NOTE PROVIDER - CARE PROVIDERS DIRECT ADDRESSES
[email or web address]

## 2020-04-22 NOTE — PROGRESS NOTE ADULT - PROBLEM SELECTOR PLAN 3
Toxic megacolon s/p ex lap and subtotal colectomy and ileostomy 3/31; course c/b fever and purulent wound infection (4/9), treated with vanc and zosyn.   - s/p laparotomy with wound vac in place (gen surg following)   - ileostomy in place draining brown stool with no signs of bleeding Toxic megacolon s/p ex lap and subtotal colectomy and ileostomy 3/31; course c/b fever and purulent wound infection (4/9), treated with vanc (4/19-4/13) and zosyn.   - s/p laparotomy with wound vac in place (gen surg following)   - ileostomy in place draining brown stool with no signs of bleeding  - general surgery following, appreciate recs     #wound vac  - would care NP following and continue teaching to patient on wound vac care and instructions  - will need wound care on discharge

## 2020-04-22 NOTE — PROGRESS NOTE ADULT - SUBJECTIVE AND OBJECTIVE BOX
Patient seen and examined at bedside as per COVID protocol.  Meds, vitals, labs, imaging reviewed.        Medications:    acetaminophen   Tablet .. 500 milliGRAM(s) every 6 hours PRN  amLODIPine   Tablet 5 milliGRAM(s) daily  dextrose 40% Gel 15 Gram(s) once PRN  dextrose 5%. 1000 milliLiter(s) <Continuous>  dextrose 50% Injectable 12.5 Gram(s) once  dextrose 50% Injectable 25 Gram(s) once  dextrose 50% Injectable 25 Gram(s) once  glucagon  Injectable 1 milliGRAM(s) once PRN  heparin   Injectable 7500 Unit(s) every 8 hours  insulin glargine Injectable (LANTUS) 6 Unit(s) at bedtime  insulin regular  human corrective regimen sliding scale   every 6 hours  pantoprazole  Injectable 80 milliGRAM(s) every 12 hours  rifAXIMin 550 milliGRAM(s) two times a day  traZODone 50 milliGRAM(s) at bedtime PRN      Allergies    No Known Allergies    Intolerances        T(C): , Max: 37.1 (04-21-20 @ 13:53)  T(F): , Max: 98.7 (04-21-20 @ 13:53)  HR: 79 (04-22-20 @ 06:38)  BP: 145/53 (04-22-20 @ 06:38)  BP(mean): 106 (04-21-20 @ 17:43)  RR: 17 (04-22-20 @ 06:38)  SpO2: 93% (04-22-20 @ 08:32)  Wt(kg): --    04-21 @ 07:01  -  04-22 @ 07:00  --------------------------------------------------------  IN:    Albumin 25%: 100 mL    Oral Fluid: 537 mL  Total IN: 637 mL    OUT:    Ileostomy: 300 mL    Voided: 550 mL  Total OUT: 850 mL    Total NET: -213 mL      04-22 @ 07:01  -  04-22 @ 10:48  --------------------------------------------------------  IN:  Total IN: 0 mL    OUT:    Ileostomy: 500 mL  Total OUT: 500 mL    Total NET: -500 mL            ROS: Unable due to limited contact    Physical exam as per primary team due to COVID protocol    General: NAD, resting comfortably in bed  C/V: NSR  Pulm: Nonlabored breathing, no respiratory distress  Abd: soft, NT/ND, vac in place, ostomy w/ small mucocutaneous separation, gas/liquid in bag      LABS:                        7.4    16.46 )-----------( 444      ( 21 Apr 2020 06:55 )             22.8     04-21    139  |  99  |  36<H>  ----------------------------<  149<H>  4.3   |  24  |  6.92<H>    Ca    8.1<L>      21 Apr 2020 06:55  Phos  5.6     04-21  Mg     2.0     04-21    TPro  6.7  /  Alb  3.4  /  TBili  0.4  /  DBili  x   /  AST  13  /  ALT  6<L>  /  AlkPhos  92  04-21                RADIOLOGY & ADDITIONAL STUDIES: Patient seen and examined at bedside as per COVID protocol.  Meds, vitals, labs, imaging reviewed.        Medications:    acetaminophen   Tablet .. 500 milliGRAM(s) every 6 hours PRN  amLODIPine   Tablet 5 milliGRAM(s) daily  dextrose 40% Gel 15 Gram(s) once PRN  dextrose 5%. 1000 milliLiter(s) <Continuous>  dextrose 50% Injectable 12.5 Gram(s) once  dextrose 50% Injectable 25 Gram(s) once  dextrose 50% Injectable 25 Gram(s) once  glucagon  Injectable 1 milliGRAM(s) once PRN  heparin   Injectable 7500 Unit(s) every 8 hours  insulin glargine Injectable (LANTUS) 6 Unit(s) at bedtime  insulin regular  human corrective regimen sliding scale   every 6 hours  pantoprazole  Injectable 80 milliGRAM(s) every 12 hours  rifAXIMin 550 milliGRAM(s) two times a day  traZODone 50 milliGRAM(s) at bedtime PRN      Allergies    No Known Allergies        T(C): , Max: 37.1 (04-21-20 @ 13:53)  T(F): , Max: 98.7 (04-21-20 @ 13:53)  HR: 79 (04-22-20 @ 06:38)  BP: 145/53 (04-22-20 @ 06:38)  BP(mean): 106 (04-21-20 @ 17:43)  RR: 17 (04-22-20 @ 06:38)  SpO2: 93% (04-22-20 @ 08:32)  Wt(kg): --    04-21 @ 07:01  -  04-22 @ 07:00  --------------------------------------------------------  IN:    Albumin 25%: 100 mL    Oral Fluid: 537 mL  Total IN: 637 mL    OUT:    Ileostomy: 300 mL    Voided: 550 mL  Total OUT: 850 mL    Total NET: -213 mL      04-22 @ 07:01  -  04-22 @ 10:48  --------------------------------------------------------  IN:  Total IN: 0 mL    OUT:    Ileostomy: 500 mL  Total OUT: 500 mL    Total NET: -500 mL            ROS: Unable due to limited contact    Physical exam as per primary team due to COVID protocol    General: NAD, resting comfortably in bed  C/V: NSR  Pulm: Nonlabored breathing, no respiratory distress  Abd: soft, NT/ND, vac in place, ostomy w/ small mucocutaneous separation, gas/liquid in bag      LABS:                        7.4    16.46 )-----------( 444      ( 21 Apr 2020 06:55 )             22.8     04-21    139  |  99  |  36<H>  ----------------------------<  149<H>  4.3   |  24  |  6.92<H>    Ca    8.1<L>      21 Apr 2020 06:55  Phos  5.6     04-21  Mg     2.0     04-21    TPro  6.7  /  Alb  3.4  /  TBili  0.4  /  DBili  x   /  AST  13  /  ALT  6<L>  /  AlkPhos  92  04-21                RADIOLOGY & ADDITIONAL STUDIES:

## 2020-04-22 NOTE — DISCHARGE NOTE PROVIDER - NSDCFUADDAPPT_GEN_ALL_CORE_FT
Please follow up with your PCP within 2 weeks.     Please follow up with outpatient hepatology/ gastroenterology to further work up your cirrhosis and possible bleeding. Please follow up with your PCP within 2 weeks.     Please follow up with outpatient hepatology/gastroenterology to further work up your cirrhosis and possible bleeding. Please follow up with your PCP within 2 weeks.     Please follow up with outpatient hepatology/gastroenterology to further work up your cirrhosis and possible bleeding.    Please follow up with Dr. Wakefield (Surgery) after your discharge. The [facility] was contacted so that you can follow up with a Primary Care Physician when you leave the hospital. Someone from their office will contact to you schedule an appointment date and time. Please contact [phone] if you do not hear from anyone by next week.    Please follow up with outpatient hepatology/gastroenterology to further work up your cirrhosis and possible bleeding.    Please follow up with Dr. Wakefield (Surgery) after your discharge. [facility] so that you can follow up with a Primary Care Physician when you leave the hospital.  You have a telehealth appointment scheduled for Thursday, June 18th at 1:45 PM.    Please follow up with outpatient hepatology/gastroenterology to further work up your cirrhosis and possible bleeding.    Please follow up with Dr. Wakefield (Surgery) after your discharge.

## 2020-04-22 NOTE — PROGRESS NOTE ADULT - PROBLEM SELECTOR PLAN 4
Nonoliguric MEG 2/2 likey ATN, per nephrology. Per renal, likely combination of pre-renal, intrinsic cause 2/2 COVID-19 and contrast exposure on 4/10 vs vancomycin toxicity.   - previously on albumin, octreotide, and midodrine for albumin trial for possible hepatorenal syndrome, but without improvement in kidney function  - net even fluid balance   - renal recommending gentle IV hydration if high GI output  - avoid ACE/ARB/NSAIDs/metformin/contrast   - strict I/Os   - appreciate renal recs    #hyponatremia   RESOLVED     #metabolic acidosis   RESOLVED  Previously on sodium bicarbonate tabs TID

## 2020-04-22 NOTE — PROGRESS NOTE ADULT - PROBLEM SELECTOR PLAN 1
COVID+   - s/p hydroxychloroquine and azithromycin 4/13-4/16  - saturating well on RA, sometimes on 2L; placed on 2L for comfort for saturations this AM to 90% on RA

## 2020-04-22 NOTE — DISCHARGE NOTE PROVIDER - NSDCMRMEDTOKEN_GEN_ALL_CORE_FT
amLODIPine 10 mg oral tablet: 1 tab(s) orally once a day  aspirin 81 mg oral tablet: 1 tab(s) orally once a day  baclofen 10 mg oral tablet: 1 tab(s) orally 3 times a day  gabapentin: 1200 milligram(s) orally 3 times a day  Janumet 50 mg-1000 mg oral tablet: 1 tab(s) orally 2 times a day  lactulose 10 g/15 mL oral solution: 1 tbsp TID  Levemir 100 units/mL subcutaneous solution: 29 unit(s) subcutaneous every 12 hours  levothyroxine 50 mcg (0.05 mg) oral tablet: 1 tab(s) orally once a day  lisinopril-hydrochlorothiazide 20 mg-25 mg oral tablet: 1 tab(s) orally once a day  NovoLOG 100 units/mL subcutaneous solution: 22 unit(s) subcutaneous 2 times a day, before breakfast and before dinner  NovoLOG 100 units/mL subcutaneous solution: 10 unit(s) subcutaneous once a day before lunch  pravastatin 40 mg oral tablet: 1 tab(s) orally once a day  traZODone 100 mg oral tablet: 100 milligram(s) orally once a day (at bedtime) acetaminophen 500 mg oral tablet: 2 tab(s) orally every 12 hours, As needed, Mild Pain (1 - 3), Moderate Pain (4 - 6)  amLODIPine 10 mg oral tablet: 1 tab(s) orally once a day  baclofen 10 mg oral tablet: 1 tab(s) orally 3 times a day  brimonidine 0.2% ophthalmic solution: 1 drop(s) to each affected eye 3 times a day  diphenoxylate-atropine 2.5 mg-0.025 mg oral tablet: 1 tab(s) orally once a day  dorzolamide 2% ophthalmic solution: 1 drop(s) to each affected eye 3 times a day  gabapentin: 1200 milligram(s) orally 3 times a day  gabapentin 300 mg oral capsule: 1 cap(s) orally every 8 hours  Janumet 50 mg-1000 mg oral tablet: 1 tab(s) orally 2 times a day  Levemir 100 units/mL subcutaneous solution: 29 unit(s) subcutaneous every 12 hours  levothyroxine 50 mcg (0.05 mg) oral tablet: 1 tab(s) orally once a day  lisinopril-hydrochlorothiazide 20 mg-25 mg oral tablet: 1 tab(s) orally once a day  NovoLOG 100 units/mL subcutaneous solution: 22 unit(s) subcutaneous 2 times a day, before breakfast and before dinner  NovoLOG 100 units/mL subcutaneous solution: 10 unit(s) subcutaneous once a day before lunch  pantoprazole 40 mg oral delayed release tablet: 1 tab(s) orally every 12 hours  pravastatin 40 mg oral tablet: 1 tab(s) orally once a day  rifAXIMin 550 mg oral tablet: 1 tab(s) orally 2 times a day  sodium bicarbonate 650 mg oral tablet: 1 tab(s) orally 3 times a day  traZODone 100 mg oral tablet: 100 milligram(s) orally once a day (at bedtime) acetaminophen 500 mg oral tablet: 2 tab(s) orally every 12 hours, As needed, Mild Pain (1 - 3), Moderate Pain (4 - 6)  amLODIPine 10 mg oral tablet: 1 tab(s) orally once a day  baclofen 10 mg oral tablet: 1 tab(s) orally 3 times a day  brimonidine 0.2% ophthalmic solution: 1 drop(s) to each affected eye 3 times a day  diphenoxylate-atropine 2.5 mg-0.025 mg oral tablet: 1 tab(s) orally once a day  dorzolamide 2% ophthalmic solution: 1 drop(s) to each affected eye 3 times a day  gabapentin 300 mg oral capsule: 1 cap(s) orally every 8 hours  Janumet 50 mg-1000 mg oral tablet: 1 tab(s) orally 2 times a day  Levemir 100 units/mL subcutaneous solution: 29 unit(s) subcutaneous every 12 hours  levothyroxine 50 mcg (0.05 mg) oral tablet: 1 tab(s) orally once a day  NovoLOG 100 units/mL subcutaneous solution: 22 unit(s) subcutaneous 2 times a day, before breakfast and before dinner  NovoLOG 100 units/mL subcutaneous solution: 10 unit(s) subcutaneous once a day before lunch  ondansetron 4 mg oral tablet: 1 tab(s) orally every 8 hours, As needed, Nausea and/or Vomiting  pantoprazole 40 mg oral delayed release tablet: 1 tab(s) orally every 12 hours  pravastatin 40 mg oral tablet: 1 tab(s) orally once a day  rifAXIMin 550 mg oral tablet: 1 tab(s) orally 2 times a day  sodium bicarbonate 650 mg oral tablet: 1 tab(s) orally 3 times a day  traZODone 100 mg oral tablet: 100 milligram(s) orally once a day (at bedtime) acetaminophen 500 mg oral tablet: 2 tab(s) orally every 12 hours, As needed, Mild Pain (1 - 3), Moderate Pain (4 - 6)  amLODIPine 10 mg oral tablet: 1 tab(s) orally once a day  baclofen 10 mg oral tablet: 1 tab(s) orally 3 times a day  brimonidine 0.2% ophthalmic solution: 1 drop(s) to each affected eye 3 times a day  diphenoxylate-atropine 2.5 mg-0.025 mg oral tablet: 1 tab(s) orally once a day  dorzolamide 2% ophthalmic solution: 1 drop(s) to each affected eye 3 times a day  gabapentin 300 mg oral capsule: 1 cap(s) orally every 8 hours  Janumet 50 mg-1000 mg oral tablet: 1 tab(s) orally 2 times a day  ondansetron 4 mg oral tablet: 1 tab(s) orally every 8 hours, As needed, Nausea and/or Vomiting  pantoprazole 40 mg oral delayed release tablet: 1 tab(s) orally every 12 hours  pravastatin 40 mg oral tablet: 1 tab(s) orally once a day  rifAXIMin 550 mg oral tablet: 1 tab(s) orally 2 times a day  sodium bicarbonate 650 mg oral tablet: 1 tab(s) orally 3 times a day  traZODone 100 mg oral tablet: 100 milligram(s) orally once a day (at bedtime) acetaminophen 500 mg oral tablet: 2 tab(s) orally every 12 hours, As needed, Mild Pain (1 - 3), Moderate Pain (4 - 6)  amLODIPine 10 mg oral tablet: 1 tab(s) orally once a day  baclofen 10 mg oral tablet: 1 tab(s) orally 3 times a day  brimonidine 0.2% ophthalmic solution: 1 drop(s) to each affected eye 3 times a day  diphenoxylate-atropine 2.5 mg-0.025 mg oral tablet: 1 tab(s) orally once a day  dorzolamide 2% ophthalmic solution: 1 drop(s) to each affected eye 3 times a day  gabapentin 300 mg oral capsule: 1 cap(s) orally every 8 hours  Janumet 50 mg-1000 mg oral tablet: 1 tab(s) orally 2 times a day  ondansetron 4 mg oral tablet: 1 tab(s) orally every 8 hours, As needed, Nausea and/or Vomiting  pantoprazole 40 mg oral delayed release tablet: 1 tab(s) orally every 12 hours  petrolatum topical ointment: 1 application topically 2 times a day  pravastatin 40 mg oral tablet: 1 tab(s) orally once a day  rifAXIMin 550 mg oral tablet: 1 tab(s) orally 2 times a day  sodium bicarbonate 650 mg oral tablet: 1 tab(s) orally 3 times a day  traZODone 100 mg oral tablet: 100 milligram(s) orally once a day (at bedtime) acetaminophen 500 mg oral tablet: 2 tab(s) orally every 12 hours, As needed, Mild Pain (1 - 3), Moderate Pain (4 - 6)  amLODIPine 10 mg oral tablet: 1 tab(s) orally once a day  baclofen 10 mg oral tablet: 1 tab(s) orally 3 times a day  brimonidine 0.2% ophthalmic solution: 1 drop(s) to each affected eye 3 times a day  diphenoxylate-atropine 2.5 mg-0.025 mg oral tablet: 1 tab(s) orally once a day  dorzolamide 2% ophthalmic solution: 1 drop(s) to each affected eye 3 times a day  gabapentin 300 mg oral capsule: 1 cap(s) orally every 8 hours  Janumet 50 mg-1000 mg oral tablet: 1 tab(s) orally 2 times a day  loperamide 2 mg oral capsule: 1 cap(s) orally once a day  ondansetron 4 mg oral tablet: 1 tab(s) orally every 8 hours, As needed, Nausea and/or Vomiting  pantoprazole 40 mg oral delayed release tablet: 1 tab(s) orally every 12 hours  petrolatum topical ointment: 1 application topically 2 times a day  pravastatin 40 mg oral tablet: 1 tab(s) orally once a day  rifAXIMin 550 mg oral tablet: 1 tab(s) orally 2 times a day  sodium bicarbonate 650 mg oral tablet: 1 tab(s) orally 3 times a day  traZODone 100 mg oral tablet: 100 milligram(s) orally once a day (at bedtime) amLODIPine 10 mg oral tablet: 1 tab(s) orally once a day  baclofen 5 mg oral tablet: 0.5 tab(s) orally every 12 hours   brimonidine 0.2% ophthalmic solution: 1  to each affected eye 3 times a day   diphenoxylate-atropine 2.5 mg-0.025 mg oral tablet: 1 tab(s) orally once a day  dorzolamide 2% ophthalmic solution: 1  to each affected eye 3 times a day   gabapentin 300 mg oral capsule: 1 cap(s) orally every 8 hours  glucometer (per patient&#x27;s insurance): 1 application subcutaneous 4 times a day   Janumet 50 mg-1000 mg oral tablet: 1 tab(s) orally 2 times a day  lancets: 1 application subcutaneously 4 times a day   Lipitor 10 mg oral tablet: 1 tab(s) orally once a day (at bedtime)  loperamide 2 mg oral capsule: 1 cap(s) orally once a day  loperamide 2 mg oral capsule: 1 cap(s) orally once a day, As needed, high ostomy output  loperamide 2 mg oral capsule: 1 cap(s) orally once a day  pantoprazole 40 mg oral delayed release tablet: 1 tab(s) orally every 12 hours  rifAXIMin 550 mg oral tablet: 1 tab(s) orally 2 times a day  sodium bicarbonate 650 mg oral tablet: 2 tab(s) orally 3 times a day  sodium chloride 1 g oral tablet: 1 tab(s) orally 3 times a day  traZODone 100 mg oral tablet: 100 milligram(s) orally once a day (at bedtime) amLODIPine 10 mg oral tablet: 1 tab(s) orally once a day  baclofen 5 mg oral tablet: 0.5 tab(s) orally every 12 hours   brimonidine 0.2% ophthalmic solution: 1  to each affected eye 3 times a day   dorzolamide 2% ophthalmic solution: 1  to each affected eye 3 times a day   gabapentin 300 mg oral capsule: 1 cap(s) orally every 8 hours  glucometer (per patient&#x27;s insurance): 1 application subcutaneous 4 times a day   lancets: 1 application subcutaneously 4 times a day   Lipitor 10 mg oral tablet: 1 tab(s) orally once a day (at bedtime)  loperamide 2 mg oral capsule: 1 cap(s) orally once a day  loperamide 2 mg oral capsule: 1 cap(s) orally once a day, As needed, high ostomy output  pantoprazole 40 mg oral delayed release tablet: 1 tab(s) orally every 12 hours  rifAXIMin 550 mg oral tablet: 1 tab(s) orally 2 times a day  sodium bicarbonate 650 mg oral tablet: 2 tab(s) orally 3 times a day  sodium chloride 1 g oral tablet: 1 tab(s) orally 3 times a day  traZODone 100 mg oral tablet: 100 milligram(s) orally once a day (at bedtime) amLODIPine 10 mg oral tablet: 1 tab(s) orally once a day  baclofen 5 mg oral tablet: 0.5 tab(s) orally every 12 hours   brimonidine 0.2% ophthalmic solution: 1  to each affected eye 3 times a day   dorzolamide 2% ophthalmic solution: 1  to each affected eye 3 times a day   gabapentin 300 mg oral capsule: 1 cap(s) orally every 8 hours  glucometer (per patient&#x27;s insurance): 1 application subcutaneous 4 times a day   HumaLOG KwikPen 100 units/mL injectable solution: 10 unit(s) injectable once a day (before a meal)   Insulin Pen Needles, 4mm: 1 application subcutaneously 4 times a day. ** Use with insulin pen **   lancets: 1 application subcutaneously 4 times a day   Lantus Solostar Pen 100 units/mL subcutaneous solution: 24 unit(s) subcutaneous once a day (at bedtime)   Lipitor 10 mg oral tablet: 1 tab(s) orally once a day (at bedtime)  loperamide 2 mg oral capsule: 1 cap(s) orally once a day  loperamide 2 mg oral capsule: 1 cap(s) orally once a day, As needed, high ostomy output  pantoprazole 40 mg oral delayed release tablet: 1 tab(s) orally every 12 hours  rifAXIMin 550 mg oral tablet: 1 tab(s) orally 2 times a day  sodium bicarbonate 650 mg oral tablet: 2 tab(s) orally 3 times a day  sodium chloride 1 g oral tablet: 1 tab(s) orally 3 times a day  traZODone 100 mg oral tablet: 100 milligram(s) orally once a day (at bedtime)

## 2020-04-22 NOTE — PROGRESS NOTE ADULT - PROBLEM SELECTOR PLAN 7
- restart half home dose of amlodipine at 5mg daily (10mg home dose)  - restart home pravastatin 40mg daily

## 2020-04-22 NOTE — PROGRESS NOTE ADULT - ASSESSMENT
59 y/o M with PMH of HTN, DM complicated by neuropathy, IVDU, ETOH liver cirrhosis, who presents to [facility] for fall at home; found to have toxic megacolon and pneumatosis; underwent ex lap and subtotal colectomy, and ileostomy on 3/31. Developed COVID 19 while in the hospital, but subsequent test was positive, patient was transferred to tele COVID unit without need for supplemental oxygen. Now on 2L NC. Developed worsening MEG with low suspicion for hepatorenal syndrome. Has general surgery managing would vac.

## 2020-04-22 NOTE — PROGRESS NOTE ADULT - SUBJECTIVE AND OBJECTIVE BOX
OVERNIGHT EVENTS:    SUBJECTIVE / INTERVAL HPI: Patient seen and examined at bedside.     VITAL SIGNS:  Vital Signs Last 24 Hrs  T(C): 36.7 (22 Apr 2020 06:38), Max: 37.1 (21 Apr 2020 13:53)  T(F): 98.1 (22 Apr 2020 06:38), Max: 98.7 (21 Apr 2020 13:53)  HR: 79 (22 Apr 2020 06:38) (70 - 81)  BP: 145/53 (22 Apr 2020 06:38) (143/66 - 169/73)  BP(mean): 106 (21 Apr 2020 17:43) (99 - 106)  RR: 17 (22 Apr 2020 06:38) (17 - 29)  SpO2: 93% (22 Apr 2020 08:32) (90% - 97%)    PHYSICAL EXAM:    General: WDWN  HEENT: NC/AT; PERRL, anicteric sclera; MMM  Neck: supple  Cardiovascular: +S1/S2; RRR  Respiratory: CTA B/L; no W/R/R  Gastrointestinal: soft, NT/ND; +BSx4  Extremities: WWP; no edema, clubbing or cyanosis  Vascular: 2+ radial, DP/PT pulses B/L  Neurological: AAOx3; no focal deficits    MEDICATIONS:  MEDICATIONS  (STANDING):  amLODIPine   Tablet 5 milliGRAM(s) Oral daily  dextrose 5%. 1000 milliLiter(s) (50 mL/Hr) IV Continuous <Continuous>  dextrose 50% Injectable 12.5 Gram(s) IV Push once  dextrose 50% Injectable 25 Gram(s) IV Push once  dextrose 50% Injectable 25 Gram(s) IV Push once  heparin   Injectable 7500 Unit(s) SubCutaneous every 8 hours  insulin glargine Injectable (LANTUS) 6 Unit(s) SubCutaneous at bedtime  insulin regular  human corrective regimen sliding scale   SubCutaneous every 6 hours  levothyroxine Injectable 30 MICROGram(s) IV Push <User Schedule>  pantoprazole  Injectable 80 milliGRAM(s) IV Push every 12 hours  rifAXIMin 550 milliGRAM(s) Oral two times a day    MEDICATIONS  (PRN):  acetaminophen   Tablet .. 500 milliGRAM(s) Oral every 6 hours PRN Temp greater or equal to 38C (100.4F), Mild Pain (1 - 3)  dextrose 40% Gel 15 Gram(s) Oral once PRN Blood Glucose LESS THAN 70 milliGRAM(s)/deciliter  glucagon  Injectable 1 milliGRAM(s) IntraMuscular once PRN Glucose LESS THAN 70 milligrams/deciliter      ALLERGIES:  Allergies    No Known Allergies    Intolerances        LABS:                        7.4    16.46 )-----------( 444      ( 21 Apr 2020 06:55 )             22.8     04-21    139  |  99  |  36<H>  ----------------------------<  149<H>  4.3   |  24  |  6.92<H>    Ca    8.1<L>      21 Apr 2020 06:55  Phos  5.6     04-21  Mg     2.0     04-21    TPro  6.7  /  Alb  3.4  /  TBili  0.4  /  DBili  x   /  AST  13  /  ALT  6<L>  /  AlkPhos  92  04-21        CAPILLARY BLOOD GLUCOSE      POCT Blood Glucose.: 187 mg/dL (22 Apr 2020 08:19)      RADIOLOGY & ADDITIONAL TESTS: Reviewed. OVERNIGHT EVENTS: Pt complaining of nausea, given zofran x1 and received trazodone 50mg (home med) for sleep    SUBJECTIVE / INTERVAL HPI: Patient seen and examined at bedside. Reports that he is tired of being in the hospital and refuses to undergo capsule endoscopy today. Understands the risks associated with refusing capsule endoscopy. Pt denies shortness of breath or other respiratory symptoms. Main complaint is abdominal discomfort. States he is "done with medical procedures" and would like to return to his shelter.    VITAL SIGNS:  Vital Signs Last 24 Hrs  T(C): 36.7 (22 Apr 2020 06:38), Max: 37.1 (21 Apr 2020 13:53)  T(F): 98.1 (22 Apr 2020 06:38), Max: 98.7 (21 Apr 2020 13:53)  HR: 79 (22 Apr 2020 06:38) (70 - 81)  BP: 145/53 (22 Apr 2020 06:38) (143/66 - 169/73)  BP(mean): 106 (21 Apr 2020 17:43) (99 - 106)  RR: 17 (22 Apr 2020 06:38) (17 - 29)  SpO2: 93% (22 Apr 2020 08:32) (90% - 97%)    PHYSICAL EXAM:    General: middle aged male in no acute distress, grouchy demeanor, breathing comfortably on RA  HEENT: NC/AT; PERRL, anicteric sclera; MMM  Respiratory: no increased work of breathing with no accessory muscle use; saturating 91% on RA and 93-95% on 2L   Gastrointestinal: distended, mildly firm, mildly tender diffusely; with wound vanc covering surgical wound, clean dry and intact; ileostomy with dark brown nonmelanotic liquid stool   Extremities: WWP; no edema, clubbing or cyanosis  Vascular: 2+ radial, DP/PT pulses B/L  Neurological: AAOx3; no focal deficits    MEDICATIONS:  MEDICATIONS  (STANDING):  amLODIPine   Tablet 5 milliGRAM(s) Oral daily  dextrose 5%. 1000 milliLiter(s) (50 mL/Hr) IV Continuous <Continuous>  dextrose 50% Injectable 12.5 Gram(s) IV Push once  dextrose 50% Injectable 25 Gram(s) IV Push once  dextrose 50% Injectable 25 Gram(s) IV Push once  heparin   Injectable 7500 Unit(s) SubCutaneous every 8 hours  insulin glargine Injectable (LANTUS) 6 Unit(s) SubCutaneous at bedtime  insulin regular  human corrective regimen sliding scale   SubCutaneous every 6 hours  levothyroxine Injectable 30 MICROGram(s) IV Push <User Schedule>  pantoprazole  Injectable 80 milliGRAM(s) IV Push every 12 hours  rifAXIMin 550 milliGRAM(s) Oral two times a day    MEDICATIONS  (PRN):  acetaminophen   Tablet .. 500 milliGRAM(s) Oral every 6 hours PRN Temp greater or equal to 38C (100.4F), Mild Pain (1 - 3)  dextrose 40% Gel 15 Gram(s) Oral once PRN Blood Glucose LESS THAN 70 milliGRAM(s)/deciliter  glucagon  Injectable 1 milliGRAM(s) IntraMuscular once PRN Glucose LESS THAN 70 milligrams/deciliter      ALLERGIES:  Allergies    No Known Allergies    Intolerances        LABS:                        7.4    16.46 )-----------( 444      ( 21 Apr 2020 06:55 )             22.8     04-21    139  |  99  |  36<H>  ----------------------------<  149<H>  4.3   |  24  |  6.92<H>    Ca    8.1<L>      21 Apr 2020 06:55  Phos  5.6     04-21  Mg     2.0     04-21    TPro  6.7  /  Alb  3.4  /  TBili  0.4  /  DBili  x   /  AST  13  /  ALT  6<L>  /  AlkPhos  92  04-21        CAPILLARY BLOOD GLUCOSE      POCT Blood Glucose.: 187 mg/dL (22 Apr 2020 08:19)      RADIOLOGY & ADDITIONAL TESTS: Reviewed.

## 2020-04-22 NOTE — PROGRESS NOTE ADULT - ATTENDING COMMENTS
A/P 60yMale with hx of DM now with COVID, worsening renal function, moderate hyperglycemia.     1.  DM: type 2,   lantus 7 at bedtime, lispro 2 untis tid with meals.   Continue lispro moderate dose scale with meals and at bedtime.   Continue consistent carb diet  FSG Goal 100-180    2.  Hyperlipidemia - goal LDL < 70  can resume statin    3.  Obesity - outpatient medical management.      4.  Hypothyroidism - continue levothyroxine 50mcg once daily      Pt is advised to follow up with me at discharge by calling .      Evelyne Underwood MD, PhD  Endocrinology  [address]  [place] [address]  [phone] Tel  [phone] Fax  [email or web address]

## 2020-04-22 NOTE — PROGRESS NOTE ADULT - ATTENDING COMMENTS
worsening kidney function  needs post void bladder sonogram or straight cath  if BUN/Creat continue to rise may need to proceed with dialysis  needs repeat u/a and urine NA- prior Urine Na low c/w prerenal  prefer less pantoprazole as well  will review with med team

## 2020-04-22 NOTE — DISCHARGE NOTE PROVIDER - CARE PROVIDER_API CALL
Sha Wakefield S  SURGERY  [address] [place] [address]  Phone: [phone]  Fax: [phone]  Follow Up Time: Sha Wakefield S  SURGERY  [address] [place] [address]  Phone: [phone]  Fax: [phone]  Follow Up Time:     Daniel Frankel  GASTROENTEROLOGY  [address], [place] [address]  Phone: [phone]  Fax: [phone]  Follow Up Time:

## 2020-04-22 NOTE — PROGRESS NOTE ADULT - SUBJECTIVE AND OBJECTIVE BOX
SUBJECTIVE: Patient seen and examined bedside by chief resident.    amLODIPine   Tablet 5 milliGRAM(s) Oral daily  heparin   Injectable 7500 Unit(s) SubCutaneous every 8 hours  rifAXIMin 550 milliGRAM(s) Oral two times a day    MEDICATIONS  (PRN):  acetaminophen   Tablet .. 500 milliGRAM(s) Oral every 6 hours PRN Temp greater or equal to 38C (100.4F)  dextrose 40% Gel 15 Gram(s) Oral once PRN Blood Glucose LESS THAN 70 milliGRAM(s)/deciliter  glucagon  Injectable 1 milliGRAM(s) IntraMuscular once PRN Glucose LESS THAN 70 milligrams/deciliter      I&O's Detail    21 Apr 2020 07:01  -  22 Apr 2020 07:00  --------------------------------------------------------  IN:    Albumin 25%: 100 mL    Oral Fluid: 537 mL  Total IN: 637 mL    OUT:    Ileostomy: 300 mL    Voided: 550 mL  Total OUT: 850 mL    Total NET: -213 mL          Vital Signs Last 24 Hrs  T(C): 36.7 (22 Apr 2020 06:38), Max: 37.2 (21 Apr 2020 10:11)  T(F): 98.1 (22 Apr 2020 06:38), Max: 98.9 (21 Apr 2020 10:11)  HR: 79 (22 Apr 2020 06:38) (70 - 86)  BP: 145/53 (22 Apr 2020 06:38) (143/66 - 169/73)  BP(mean): 106 (21 Apr 2020 17:43) (98 - 106)  RR: 17 (22 Apr 2020 06:38) (17 - 29)  SpO2: 90% (22 Apr 2020 06:38) (90% - 97%)    General: NAD, resting comfortably in bed  C/V: NSR  Pulm: Nonlabored breathing, no respiratory distress  Abd: soft, NT/ND, vac in place, ostomy w/ small mucocutaneous separation, gas/liquid in bag      LABS:                        7.4    16.46 )-----------( 444      ( 21 Apr 2020 06:55 )             22.8     04-21    139  |  99  |  36<H>  ----------------------------<  149<H>  4.3   |  24  |  6.92<H>    Ca    8.1<L>      21 Apr 2020 06:55  Phos  5.6     04-21  Mg     2.0     04-21    TPro  6.7  /  Alb  3.4  /  TBili  0.4  /  DBili  x   /  AST  13  /  ALT  6<L>  /  AlkPhos  92  04-21          RADIOLOGY & ADDITIONAL STUDIES:  CT Abdomen and Pelvis No Cont:   EXAM:  CT ABDOMEN AND PELVIS                          PROCEDURE DATE:  04/19/2020          INTERPRETATION:  CLINICAL INDICATION: 60-year-old with increasing white blood cell count and increasing abdominal distention.          FINDINGS: CT of the abdomen and pelvis was performed without administration of intravenous contrast. Reconstructions were performed in the sagittal and coronal planes. Comparison is made to prior studies dated 4/10/2020.    Evaluation of the lower chest demonstrates no interval change in moderate bilateral pleural effusions. No interval change in small pericardial effusion. Evaluation of the abdomen demonstrates cholelithiasis. The spleen, bilateral adrenal glands and bilateral kidneys are unremarkable. Cholelithiasis.Evaluation of the gastrointestinal tract demonstrates intact Beck's pouch and subtotal colectomy. Right lower quadrant ileostomy. Increasing large volume of free fluid within the abdomen. Thickening of the peritoneal surface. Persistent clustered nodules along the peritoneal reflection in the right perirenal region the larger measuring 3.4 mm, possibly retracting blood clot. Evaluation of the pelvis demonstrate the bladder is thick-walled and also not well distended. Prostate and seminal vesicles are unremarkable. Enteric tube tip within the stomach. No evidence of bowel obstruction. Anterior abdominal incision. Mild anasarca. Presacral edema. Mesenteric edema. No adenopathy. No significant arterial vascular calcification. Evaluation of the osseous structures are unremarkable.          IMPRESSION:    Persistent large ascites and peritonitis.    Persistent bilateral pleural effusions and pericardial effusion.    Subtotal colectomy with right lower quadrant ileostomy.                    Thank you for the opportunity to participate in the care of this patient.        TARA AMBRIZ M.D., ATTENDING RADIOLOGIST  This document has been electronically signed. Apr 19 2020  5:55PM               (04-19-20 @ 16:34)

## 2020-04-23 LAB
ALBUMIN SERPL ELPH-MCNC: 3.1 G/DL — LOW (ref 3.3–5)
ALP SERPL-CCNC: 94 U/L — SIGNIFICANT CHANGE UP (ref 40–120)
ALT FLD-CCNC: 6 U/L — LOW (ref 10–45)
ANION GAP SERPL CALC-SCNC: 14 MMOL/L — SIGNIFICANT CHANGE UP (ref 5–17)
AST SERPL-CCNC: 15 U/L — SIGNIFICANT CHANGE UP (ref 10–40)
BASOPHILS # BLD AUTO: 0.02 K/UL — SIGNIFICANT CHANGE UP (ref 0–0.2)
BASOPHILS NFR BLD AUTO: 0.1 % — SIGNIFICANT CHANGE UP (ref 0–2)
BILIRUB SERPL-MCNC: 0.3 MG/DL — SIGNIFICANT CHANGE UP (ref 0.2–1.2)
BUN SERPL-MCNC: 36 MG/DL — HIGH (ref 7–23)
CALCIUM SERPL-MCNC: 8.2 MG/DL — LOW (ref 8.4–10.5)
CHLORIDE SERPL-SCNC: 100 MMOL/L — SIGNIFICANT CHANGE UP (ref 96–108)
CO2 SERPL-SCNC: 22 MMOL/L — SIGNIFICANT CHANGE UP (ref 22–31)
CREAT SERPL-MCNC: 5.82 MG/DL — HIGH (ref 0.5–1.3)
CRP SERPL-MCNC: 18.19 MG/DL — HIGH (ref 0–0.4)
CULTURE RESULTS: NO GROWTH — SIGNIFICANT CHANGE UP
EOSINOPHIL # BLD AUTO: 0.33 K/UL — SIGNIFICANT CHANGE UP (ref 0–0.5)
EOSINOPHIL NFR BLD AUTO: 1.9 % — SIGNIFICANT CHANGE UP (ref 0–6)
FERRITIN SERPL-MCNC: 675 NG/ML — HIGH (ref 30–400)
GLUCOSE BLDC GLUCOMTR-MCNC: 190 MG/DL — HIGH (ref 70–99)
GLUCOSE BLDC GLUCOMTR-MCNC: 228 MG/DL — HIGH (ref 70–99)
GLUCOSE BLDC GLUCOMTR-MCNC: 229 MG/DL — HIGH (ref 70–99)
GLUCOSE BLDC GLUCOMTR-MCNC: 237 MG/DL — HIGH (ref 70–99)
GLUCOSE SERPL-MCNC: 138 MG/DL — HIGH (ref 70–99)
HCT VFR BLD CALC: 22.4 % — LOW (ref 39–50)
HGB BLD-MCNC: 7.4 G/DL — LOW (ref 13–17)
IMM GRANULOCYTES NFR BLD AUTO: 2.4 % — HIGH (ref 0–1.5)
LYMPHOCYTES # BLD AUTO: 0.97 K/UL — LOW (ref 1–3.3)
LYMPHOCYTES # BLD AUTO: 5.7 % — LOW (ref 13–44)
MAGNESIUM SERPL-MCNC: 1.7 MG/DL — SIGNIFICANT CHANGE UP (ref 1.6–2.6)
MCHC RBC-ENTMCNC: 29.5 PG — SIGNIFICANT CHANGE UP (ref 27–34)
MCHC RBC-ENTMCNC: 33 GM/DL — SIGNIFICANT CHANGE UP (ref 32–36)
MCV RBC AUTO: 89.2 FL — SIGNIFICANT CHANGE UP (ref 80–100)
MONOCYTES # BLD AUTO: 1.25 K/UL — HIGH (ref 0–0.9)
MONOCYTES NFR BLD AUTO: 7.3 % — SIGNIFICANT CHANGE UP (ref 2–14)
NEUTROPHILS # BLD AUTO: 14.15 K/UL — HIGH (ref 1.8–7.4)
NEUTROPHILS NFR BLD AUTO: 82.6 % — HIGH (ref 43–77)
NRBC # BLD: 0 /100 WBCS — SIGNIFICANT CHANGE UP (ref 0–0)
PHOSPHATE SERPL-MCNC: 3.5 MG/DL — SIGNIFICANT CHANGE UP (ref 2.5–4.5)
PLATELET # BLD AUTO: 342 K/UL — SIGNIFICANT CHANGE UP (ref 150–400)
POTASSIUM SERPL-MCNC: 4.1 MMOL/L — SIGNIFICANT CHANGE UP (ref 3.5–5.3)
POTASSIUM SERPL-SCNC: 4.1 MMOL/L — SIGNIFICANT CHANGE UP (ref 3.5–5.3)
PROT SERPL-MCNC: 6.5 G/DL — SIGNIFICANT CHANGE UP (ref 6–8.3)
RBC # BLD: 2.51 M/UL — LOW (ref 4.2–5.8)
RBC # FLD: 14.3 % — SIGNIFICANT CHANGE UP (ref 10.3–14.5)
SODIUM SERPL-SCNC: 136 MMOL/L — SIGNIFICANT CHANGE UP (ref 135–145)
SPECIMEN SOURCE: SIGNIFICANT CHANGE UP
WBC # BLD: 17.13 K/UL — HIGH (ref 3.8–10.5)
WBC # FLD AUTO: 17.13 K/UL — HIGH (ref 3.8–10.5)

## 2020-04-23 PROCEDURE — 99232 SBSQ HOSP IP/OBS MODERATE 35: CPT

## 2020-04-23 PROCEDURE — 99233 SBSQ HOSP IP/OBS HIGH 50: CPT | Mod: GC

## 2020-04-23 RX ORDER — DIPHENOXYLATE HCL/ATROPINE 2.5-.025MG
1 TABLET ORAL DAILY
Refills: 0 | Status: DISCONTINUED | OUTPATIENT
Start: 2020-04-23 | End: 2020-04-24

## 2020-04-23 RX ORDER — MAGNESIUM SULFATE 500 MG/ML
1 VIAL (ML) INJECTION ONCE
Refills: 0 | Status: COMPLETED | OUTPATIENT
Start: 2020-04-23 | End: 2020-04-23

## 2020-04-23 RX ORDER — INSULIN LISPRO 100/ML
3 VIAL (ML) SUBCUTANEOUS
Refills: 0 | Status: DISCONTINUED | OUTPATIENT
Start: 2020-04-23 | End: 2020-04-24

## 2020-04-23 RX ORDER — AMLODIPINE BESYLATE 2.5 MG/1
10 TABLET ORAL DAILY
Refills: 0 | Status: DISCONTINUED | OUTPATIENT
Start: 2020-04-24 | End: 2020-06-05

## 2020-04-23 RX ADMIN — Medication 100 GRAM(S): at 07:28

## 2020-04-23 RX ADMIN — INSULIN GLARGINE 7 UNIT(S): 100 INJECTION, SOLUTION SUBCUTANEOUS at 22:05

## 2020-04-23 RX ADMIN — LACTULOSE 10 GRAM(S): 10 SOLUTION ORAL at 05:02

## 2020-04-23 RX ADMIN — Medication 1 TABLET(S): at 10:15

## 2020-04-23 RX ADMIN — Medication 4: at 18:04

## 2020-04-23 RX ADMIN — CHLORHEXIDINE GLUCONATE 1 APPLICATION(S): 213 SOLUTION TOPICAL at 13:10

## 2020-04-23 RX ADMIN — Medication 500 MILLIGRAM(S): at 15:39

## 2020-04-23 RX ADMIN — PANTOPRAZOLE SODIUM 40 MILLIGRAM(S): 20 TABLET, DELAYED RELEASE ORAL at 18:16

## 2020-04-23 RX ADMIN — LACTULOSE 10 GRAM(S): 10 SOLUTION ORAL at 22:08

## 2020-04-23 RX ADMIN — AMLODIPINE BESYLATE 5 MILLIGRAM(S): 2.5 TABLET ORAL at 05:01

## 2020-04-23 RX ADMIN — Medication 2: at 12:42

## 2020-04-23 RX ADMIN — LACTULOSE 10 GRAM(S): 10 SOLUTION ORAL at 15:39

## 2020-04-23 RX ADMIN — PANTOPRAZOLE SODIUM 40 MILLIGRAM(S): 20 TABLET, DELAYED RELEASE ORAL at 05:01

## 2020-04-23 RX ADMIN — Medication 2 UNIT(S): at 09:00

## 2020-04-23 RX ADMIN — Medication 2 UNIT(S): at 12:42

## 2020-04-23 RX ADMIN — HEPARIN SODIUM 7500 UNIT(S): 5000 INJECTION INTRAVENOUS; SUBCUTANEOUS at 05:01

## 2020-04-23 RX ADMIN — Medication 50 MICROGRAM(S): at 05:02

## 2020-04-23 RX ADMIN — Medication 2 UNIT(S): at 18:04

## 2020-04-23 RX ADMIN — ATORVASTATIN CALCIUM 10 MILLIGRAM(S): 80 TABLET, FILM COATED ORAL at 22:08

## 2020-04-23 RX ADMIN — Medication 500 MILLIGRAM(S): at 09:01

## 2020-04-23 RX ADMIN — HEPARIN SODIUM 7500 UNIT(S): 5000 INJECTION INTRAVENOUS; SUBCUTANEOUS at 13:08

## 2020-04-23 RX ADMIN — Medication 4: at 09:00

## 2020-04-23 RX ADMIN — Medication 4: at 22:06

## 2020-04-23 RX ADMIN — HEPARIN SODIUM 7500 UNIT(S): 5000 INJECTION INTRAVENOUS; SUBCUTANEOUS at 22:08

## 2020-04-23 NOTE — PROGRESS NOTE ADULT - SUBJECTIVE AND OBJECTIVE BOX
OVERNIGHT EVENTS: NAEO    SUBJECTIVE / INTERVAL HPI: Patient seen and examined at bedside. Reports that he is frustrated and would like to go back to his shelter.     VITAL SIGNS:  Vital Signs Last 24 Hrs  T(C): 36.7 (2020 06:08), Max: 37.2 (2020 21:10)  T(F): 98.1 (2020 06:08), Max: 98.9 (2020 21:10)  HR: 81 (2020 06:08) (78 - 88)  BP: 164/74 (2020 06:08) (154/67 - 164/74)  BP(mean): --  RR: 19 (2020 06:08) (19 - 21)  SpO2: 95% (2020 06:08) (95% - 98%)    PHYSICAL EXAM:    General: middle aged male in no acute distress, grouchy demeanor, breathing comfortably on RA  HEENT: NC/AT; PERRL, anicteric sclera; MMM  Respiratory: no increased work of breathing with no accessory muscle use; saturating 91% on RA and 93-95% on 2L   Gastrointestinal: distended, mildly firm, mildly tender diffusely; with wound vanc covering surgical wound, clean dry and intact; ileostomy with dark brown nonmelanotic liquid stool   Extremities: WWP; no edema, clubbing or cyanosis  Vascular: 2+ radial, DP/PT pulses B/L  Neurological: AAOx3; no focal deficits    MEDICATIONS:  MEDICATIONS  (STANDING):  atorvastatin 10 milliGRAM(s) Oral at bedtime  chlorhexidine 2% Cloths 1 Application(s) Topical daily  dextrose 5%. 1000 milliLiter(s) (50 mL/Hr) IV Continuous <Continuous>  dextrose 50% Injectable 12.5 Gram(s) IV Push once  dextrose 50% Injectable 25 Gram(s) IV Push once  dextrose 50% Injectable 25 Gram(s) IV Push once  heparin   Injectable 7500 Unit(s) SubCutaneous every 8 hours  insulin glargine Injectable (LANTUS) 7 Unit(s) SubCutaneous at bedtime  insulin lispro (HumaLOG) corrective regimen sliding scale   SubCutaneous Before meals and at bedtime  insulin lispro Injectable (HumaLOG) 2 Unit(s) SubCutaneous three times a day before meals  lactulose Syrup 10 Gram(s) Oral three times a day  levothyroxine 50 MICROGram(s) Oral daily  pantoprazole    Tablet 40 milliGRAM(s) Oral every 12 hours  rifAXIMin 550 milliGRAM(s) Oral two times a day    MEDICATIONS  (PRN):  acetaminophen   Tablet .. 500 milliGRAM(s) Oral every 6 hours PRN Temp greater or equal to 38C (100.4F), Mild Pain (1 - 3)  dextrose 40% Gel 15 Gram(s) Oral once PRN Blood Glucose LESS THAN 70 milliGRAM(s)/deciliter  glucagon  Injectable 1 milliGRAM(s) IntraMuscular once PRN Glucose LESS THAN 70 milligrams/deciliter  traZODone 50 milliGRAM(s) Oral at bedtime PRN Sleep      ALLERGIES:  Allergies    No Known Allergies    Intolerances        LABS:                        7.4    17.13 )-----------( 342      ( 2020 06:16 )             22.4     04-    136  |  100  |  36<H>  ----------------------------<  138<H>  4.1   |  22  |  5.82<H>    Ca    8.2<L>      2020 06:16  Phos  3.5     -  Mg     1.7     -    TPro  6.5  /  Alb  3.1<L>  /  TBili  0.3  /  DBili  x   /  AST  15  /  ALT  6<L>  /  AlkPhos  94  -23      Urinalysis Basic - ( 2020 13:14 )    Color: Yellow / Appearance: Clear / S.015 / pH: x  Gluc: x / Ketone: NEGATIVE  / Bili: Negative / Urobili: 0.2 E.U./dL   Blood: x / Protein: 30 mg/dL / Nitrite: NEGATIVE   Leuk Esterase: NEGATIVE / RBC: < 5 /HPF / WBC < 5 /HPF   Sq Epi: x / Non Sq Epi: 0-5 /HPF / Bacteria: Present /HPF      CAPILLARY BLOOD GLUCOSE      POCT Blood Glucose.: 228 mg/dL (2020 08:01)      RADIOLOGY & ADDITIONAL TESTS: Reviewed.

## 2020-04-23 NOTE — PROGRESS NOTE ADULT - ASSESSMENT
59 y/o M with PMH of HTN, DM complicated by neuropathy, IVDU, ETOH liver cirrhosis, who presents to [facility] for fall at home; found to have toxic megacolon and pneumatosis; underwent ex lap and subtotal colectomy, and ileostomy on 3/31. Developed COVID 19 while in the hospital, but subsequent test was positive, patient was transferred to tele COVID unit without need for supplemental oxygen. Now on 2L NC. Developed worsening MEG with low suspicion for hepatorenal syndrome. Has general surgery managing would vac.

## 2020-04-23 NOTE — PROGRESS NOTE ADULT - PROBLEM SELECTOR PLAN 3
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   - s/p laparotomy with wound vac in place (gen surg following)   - ileostomy in place draining brown stool with no signs of bleeding  - general surgery following, appreciate recs   - wound changes: T/Th/Sat    #wound vac  - would care NP following and continue teaching to patient on wound vac care and instructions  - will need wound care on discharge

## 2020-04-23 NOTE — PROGRESS NOTE ADULT - PROBLEM SELECTOR PLAN 9
F: none  E: replete as needed   N: clear liquid consistent carb ; will advance today  GI ppx: protonix BID   DVT ppx: heparin 7500U q12h; DVT neg by ultrasound

## 2020-04-23 NOTE — PROGRESS NOTE ADULT - PROBLEM SELECTOR PLAN 4
Nonoliguric MEG 2/2 likey ATN, per nephrology. Per renal, likely combination of pre-renal, intrinsic cause 2/2 COVID-19 and contrast exposure on 4/10 vs vancomycin toxicity.   - previously on albumin, octreotide, and midodrine for albumin trial for possible hepatorenal syndrome, but without improvement in kidney function  - FeNa 2.0% and FeUrea 63% intrinsic disease  - Cr downtrending, f/u renal re: long-term plans  - net even fluid balance   - renal recommending gentle IV hydration if high GI output  - avoid ACE/ARB/NSAIDs/metformin/contrast   - strict I/Os   - appreciate renal recs    #hyponatremia   RESOLVED     #metabolic acidosis   RESOLVED  Previously on sodium bicarbonate tabs TID

## 2020-04-23 NOTE — PROGRESS NOTE ADULT - ASSESSMENT
59 y/o M with PMH of HTN, DM complicated by neuropathy, EtOH liver cirrhosis, presenting w/ pneumatosis of unknown etiology, s/p sub total colectomy and end ileostomy on 3/31, c/b fever and purulent wound infection (4/9) now s/p paracentesis 4/13    Neuro: Pain/Nausea control  CV: HDS but hypertensive, lisinopril 40 mg and amlodipine 10mg, Labetalol 20 mg PRN BP >170, hydralazine 10 mg prn  Pulm: CED RAMIREZ: CLD  : Voids  ID: Zosyn (4/9-), vanc (4/9-4/13), azithro 4/13-4/16, plaquenil 4/13-4/16  Endo: ISS, lantus 6, synthroid  PPx: SCDs, SQH, OOBA  PT/OT: initial evaluation 4/3  Wounds: Vac in place, changes Tues/Thurs/Sat

## 2020-04-23 NOTE — PROGRESS NOTE ADULT - ATTENDING COMMENTS
Agree with above.  pt with reduced appetite  on consistent carb diet  good oxygenation on room air  can continue 7 units lantus at beditme, 3 units lispro with meals   continue levothyroxine  outpatient weight management.   will follow

## 2020-04-23 NOTE — PROGRESS NOTE ADULT - PROBLEM SELECTOR PLAN 7
- continue home dose of amlodipine at 10mg daily (10mg starting 4/24)  - restart home pravastatin 40mg daily; therapeutic interchange with atorvastatin 10mg

## 2020-04-23 NOTE — PROGRESS NOTE ADULT - PROBLEM SELECTOR PLAN 6
Type 2 DM, endocrinology following   - continue lantus 7U qhs   - continue lispro 2U TID premeals  - moderate SSI   - FSG goal 140-180    #hypothyroidism   - TSH 2.813; TPO and antithyroid Abs neg   - continue levothyroxine 50mcg   - appreciate endocrinology recs

## 2020-04-23 NOTE — PROGRESS NOTE ADULT - ATTENDING COMMENTS
Patient seen and examined. Case discussed with housestaff. Agree with above assessment and plan with addendum:    Patient with complicated course. Now improving. Patient with high output from ostomy. Starting Lomotil per Surgery recs. Patient initially refusing NANCY and wanting to return to shelter but now realizes he will not be able to manage at shelter. Requesting NANCY, will speak with SW/CM about starting process. Advance diet as tolerated, may help with high output from ostomy.   Renal recs appreciated. No HD now. Will have to be monitored as outpatient.   Awaiting NANCY placement with capability to manage wound vac.

## 2020-04-23 NOTE — PROGRESS NOTE ADULT - SUBJECTIVE AND OBJECTIVE BOX
INTERVAL HPI/OVERNIGHT EVENTS: NAEO. This AM    MEDICATIONS  (STANDING):  amLODIPine   Tablet 5 milliGRAM(s) Oral daily  atorvastatin 10 milliGRAM(s) Oral at bedtime  chlorhexidine 2% Cloths 1 Application(s) Topical daily  dextrose 5%. 1000 milliLiter(s) (50 mL/Hr) IV Continuous <Continuous>  dextrose 50% Injectable 12.5 Gram(s) IV Push once  dextrose 50% Injectable 25 Gram(s) IV Push once  dextrose 50% Injectable 25 Gram(s) IV Push once  heparin   Injectable 7500 Unit(s) SubCutaneous every 8 hours  insulin glargine Injectable (LANTUS) 7 Unit(s) SubCutaneous at bedtime  insulin lispro (HumaLOG) corrective regimen sliding scale   SubCutaneous Before meals and at bedtime  insulin lispro Injectable (HumaLOG) 2 Unit(s) SubCutaneous three times a day before meals  lactulose Syrup 10 Gram(s) Oral three times a day  levothyroxine 50 MICROGram(s) Oral daily  pantoprazole    Tablet 40 milliGRAM(s) Oral every 12 hours  rifAXIMin 550 milliGRAM(s) Oral two times a day    MEDICATIONS  (PRN):  acetaminophen   Tablet .. 500 milliGRAM(s) Oral every 6 hours PRN Temp greater or equal to 38C (100.4F), Mild Pain (1 - 3)  dextrose 40% Gel 15 Gram(s) Oral once PRN Blood Glucose LESS THAN 70 milliGRAM(s)/deciliter  glucagon  Injectable 1 milliGRAM(s) IntraMuscular once PRN Glucose LESS THAN 70 milligrams/deciliter  traZODone 50 milliGRAM(s) Oral at bedtime PRN Sleep      Vital Signs Last 24 Hrs  T(C): 36.7 (2020 06:08), Max: 37.2 (2020 21:10)  T(F): 98.1 (2020 06:08), Max: 98.9 (2020 21:10)  HR: 81 (2020 06:08) (78 - 88)  BP: 164/74 (2020 06:08) (145/53 - 164/74)  BP(mean): --  RR: 19 (2020 06:08) (17 - 21)  SpO2: 95% (2020 06:08) (90% - 98%)    PHYSICAL EXAM:      Constitutional: A&Ox3    Respiratory: non labored breathing, no respiratory distress    Cardiovascular: NSR, RRR    Gastrointestinal:                 Incision:    Genitourinary:    Extremities: (-) edema                  I&O's Detail    2020 07:01  -  2020 07:00  --------------------------------------------------------  IN:    Albumin 25%: 100 mL    Oral Fluid: 537 mL  Total IN: 637 mL    OUT:    Ileostomy: 300 mL    Voided: 550 mL  Total OUT: 850 mL    Total NET: -213 mL      2020 07:01  -  2020 06:19  --------------------------------------------------------  IN:  Total IN: 0 mL    OUT:    Ileostomy: 1850 mL    Voided: 1150 mL  Total OUT: 3000 mL    Total NET: -3000 mL          LABS:                        7.4    16.46 )-----------( 444      ( 2020 06:55 )             22.8         133<L>  |  95<L>  |  39<H>  ----------------------------<  219<H>  4.1   |  23  |  6.48<H>    Ca    8.2<L>      2020 11:06  Phos  4.3       Mg     1.8         TPro  6.8  /  Alb  3.4  /  TBili  0.3  /  DBili  x   /  AST  13  /  ALT  6<L>  /  AlkPhos  93        Urinalysis Basic - ( 2020 13:14 )    Color: Yellow / Appearance: Clear / S.015 / pH: x  Gluc: x / Ketone: NEGATIVE  / Bili: Negative / Urobili: 0.2 E.U./dL   Blood: x / Protein: 30 mg/dL / Nitrite: NEGATIVE   Leuk Esterase: NEGATIVE / RBC: < 5 /HPF / WBC < 5 /HPF   Sq Epi: x / Non Sq Epi: 0-5 /HPF / Bacteria: Present /HPF        RADIOLOGY & ADDITIONAL STUDIES: INTERVAL HPI/OVERNIGHT EVENTS: NAEO. This AM pt has no complaints. Denies n/v/cp/sob.    MEDICATIONS  (STANDING):  amLODIPine   Tablet 5 milliGRAM(s) Oral daily  atorvastatin 10 milliGRAM(s) Oral at bedtime  chlorhexidine 2% Cloths 1 Application(s) Topical daily  dextrose 5%. 1000 milliLiter(s) (50 mL/Hr) IV Continuous <Continuous>  dextrose 50% Injectable 12.5 Gram(s) IV Push once  dextrose 50% Injectable 25 Gram(s) IV Push once  dextrose 50% Injectable 25 Gram(s) IV Push once  heparin   Injectable 7500 Unit(s) SubCutaneous every 8 hours  insulin glargine Injectable (LANTUS) 7 Unit(s) SubCutaneous at bedtime  insulin lispro (HumaLOG) corrective regimen sliding scale   SubCutaneous Before meals and at bedtime  insulin lispro Injectable (HumaLOG) 2 Unit(s) SubCutaneous three times a day before meals  lactulose Syrup 10 Gram(s) Oral three times a day  levothyroxine 50 MICROGram(s) Oral daily  pantoprazole    Tablet 40 milliGRAM(s) Oral every 12 hours  rifAXIMin 550 milliGRAM(s) Oral two times a day    MEDICATIONS  (PRN):  acetaminophen   Tablet .. 500 milliGRAM(s) Oral every 6 hours PRN Temp greater or equal to 38C (100.4F), Mild Pain (1 - 3)  dextrose 40% Gel 15 Gram(s) Oral once PRN Blood Glucose LESS THAN 70 milliGRAM(s)/deciliter  glucagon  Injectable 1 milliGRAM(s) IntraMuscular once PRN Glucose LESS THAN 70 milligrams/deciliter  traZODone 50 milliGRAM(s) Oral at bedtime PRN Sleep      Vital Signs Last 24 Hrs  T(C): 36.7 (2020 06:08), Max: 37.2 (2020 21:10)  T(F): 98.1 (2020 06:08), Max: 98.9 (2020 21:10)  HR: 81 (2020 06:08) (78 - 88)  BP: 164/74 (2020 06:08) (145/53 - 164/74)  BP(mean): --  RR: 19 (2020 06:08) (17 - 21)  SpO2: 95% (2020 06:08) (90% - 98%)    PHYSICAL EXAM:      Constitutional: A&Ox3    Respiratory: non labored breathing, no respiratory distress    Cardiovascular: NSR, RRR    Gastrointestinal: soft, non-tender, moderately distended.                  Incision: wound vac in place with leak, will be changed today.     Extremities: (-) edema                  I&O's Detail    2020 07:01  -  2020 07:00  --------------------------------------------------------  IN:    Albumin 25%: 100 mL    Oral Fluid: 537 mL  Total IN: 637 mL    OUT:    Ileostomy: 300 mL    Voided: 550 mL  Total OUT: 850 mL    Total NET: -213 mL      2020 07:01  -  2020 06:19  --------------------------------------------------------  IN:  Total IN: 0 mL    OUT:    Ileostomy: 1850 mL    Voided: 1150 mL  Total OUT: 3000 mL    Total NET: -3000 mL          LABS:                        7.4    16.46 )-----------( 444      ( 2020 06:55 )             22.8     04-22    133<L>  |  95<L>  |  39<H>  ----------------------------<  219<H>  4.1   |  23  |  6.48<H>    Ca    8.2<L>      2020 11:06  Phos  4.3     04-22  Mg     1.8     -    TPro  6.8  /  Alb  3.4  /  TBili  0.3  /  DBili  x   /  AST  13  /  ALT  6<L>  /  AlkPhos  93  04-22      Urinalysis Basic - ( 2020 13:14 )    Color: Yellow / Appearance: Clear / S.015 / pH: x  Gluc: x / Ketone: NEGATIVE  / Bili: Negative / Urobili: 0.2 E.U./dL   Blood: x / Protein: 30 mg/dL / Nitrite: NEGATIVE   Leuk Esterase: NEGATIVE / RBC: < 5 /HPF / WBC < 5 /HPF   Sq Epi: x / Non Sq Epi: 0-5 /HPF / Bacteria: Present /HPF        RADIOLOGY & ADDITIONAL STUDIES:

## 2020-04-23 NOTE — PROGRESS NOTE ADULT - SUBJECTIVE AND OBJECTIVE BOX
INTERVAL HPI/OVERNIGHT EVENTS:    Patient is a 60y old  Male who presents with a chief complaint of Colectomy (2020 10:46)  Spoke to the primary team taking care of the patient.  Monitoring renal function  Diet was advanced to low fiber diet.  Saturating well on RA  FSg and insulin administration reviewed  appetite was fair    Pt reports the following symptoms:  CONSTITUTIONAL:  Negative chills  CARDIOVASCULAR:  Negative for chest pain or palpitations  RESPIRATORY:  Negative for cough, wheezing  GASTROINTESTINAL:  Negative for diarrhea, constipation  NEUROLOGIC:  No headache, confusion, dizziness, lightheadedness    MEDICATIONS  (STANDING):  atorvastatin 10 milliGRAM(s) Oral at bedtime  chlorhexidine 2% Cloths 1 Application(s) Topical daily  dextrose 5%. 1000 milliLiter(s) (50 mL/Hr) IV Continuous <Continuous>  dextrose 50% Injectable 12.5 Gram(s) IV Push once  dextrose 50% Injectable 25 Gram(s) IV Push once  dextrose 50% Injectable 25 Gram(s) IV Push once  diphenoxylate/atropine 1 Tablet(s) Oral daily  heparin   Injectable 7500 Unit(s) SubCutaneous every 8 hours  insulin glargine Injectable (LANTUS) 7 Unit(s) SubCutaneous at bedtime  insulin lispro (HumaLOG) corrective regimen sliding scale   SubCutaneous Before meals and at bedtime  insulin lispro Injectable (HumaLOG) 3 Unit(s) SubCutaneous three times a day before meals  lactulose Syrup 10 Gram(s) Oral three times a day  levothyroxine 50 MICROGram(s) Oral daily  pantoprazole    Tablet 40 milliGRAM(s) Oral every 12 hours  rifAXIMin 550 milliGRAM(s) Oral two times a day    MEDICATIONS  (PRN):  acetaminophen   Tablet .. 500 milliGRAM(s) Oral every 6 hours PRN Temp greater or equal to 38C (100.4F), Mild Pain (1 - 3)  dextrose 40% Gel 15 Gram(s) Oral once PRN Blood Glucose LESS THAN 70 milliGRAM(s)/deciliter  glucagon  Injectable 1 milliGRAM(s) IntraMuscular once PRN Glucose LESS THAN 70 milligrams/deciliter  traZODone 50 milliGRAM(s) Oral at bedtime PRN Sleep      PHYSICAL EXAM  Vital Signs Last 24 Hrs  T(C): 37.7 (2020 21:12), Max: 37.7 (2020 21:12)  T(F): 99.9 (2020 21:12), Max: 99.9 (2020 21:12)  HR: 83 (2020 21:12) (80 - 83)  BP: 117/66 (2020 21:12) (117/66 - 164/74)  BP(mean): --  RR: 18 (2020 21:12) (18 - 19)  SpO2: 95% (2020 21:12) (95% - 96%)    Due to the nature of this patient’s COVID-19 isolation status (either confirmed or suspected), this note was prepared without a bedside physical examination to prevent spread of infection and to conserve personal protective equipment during this nationwide pandemic. If possible, direct patient communication occurred via electronic measures or telephone conversation. Examination highlights were provided by a bedside nurse wearing personal protective equipment and review of pertinent medical records. Objective data (vital signs, urine output, lab results, imaging studies, medications, etc) were reviewed in detail. Face to face visits and physical examination have been limited only to patients for whom it is required for medical decision making.    LABS:                        7.4    17.13 )-----------( 342      ( 2020 06:16 )             22.4     04-23    136  |  100  |  36<H>  ----------------------------<  138<H>  4.1   |  22  |  5.82<H>    Ca    8.2<L>      2020 06:16  Phos  3.5     04-23  Mg     1.7     04-23    TPro  6.5  /  Alb  3.1<L>  /  TBili  0.3  /  DBili  x   /  AST  15  /  ALT  6<L>  /  AlkPhos  94  04-23      Urinalysis Basic - ( 2020 13:14 )    Color: Yellow / Appearance: Clear / S.015 / pH: x  Gluc: x / Ketone: NEGATIVE  / Bili: Negative / Urobili: 0.2 E.U./dL   Blood: x / Protein: 30 mg/dL / Nitrite: NEGATIVE   Leuk Esterase: NEGATIVE / RBC: < 5 /HPF / WBC < 5 /HPF   Sq Epi: x / Non Sq Epi: 0-5 /HPF / Bacteria: Present /HPF      Thyroid Stimulating Hormone, Serum: 2.813 uIU/mL ( @ 07:28)  Thyroid Stimulating Hormone, Serum: 2.877 uIU/mL ( @ 00:34)      HbA1C: 6.9 % ( @ 06:59)  7.6 % ( @ 05:58)    CAPILLARY BLOOD GLUCOSE      POCT Blood Glucose.: 229 mg/dL (2020 21:55)  POCT Blood Glucose.: 237 mg/dL (2020 17:20)  POCT Blood Glucose.: 190 mg/dL (2020 11:49)  POCT Blood Glucose.: 228 mg/dL (2020 08:01)      Insulin Sliding Scale requirements X 24 Hours:    RADIOLOGY & ADDITIONAL TESTS:    A/P 60yMale with hx of DM now with COVID, worsening renal function, moderate hyperglycemia.     1.  DM: type 2,   Please continue Lantus 7 units at bedtime  PLease increase to Lispro 3 units tid with meals.   Continue lispro moderate dose scale with meals and at bedtime.   Continue consistent carb diet  FSG Goal 100-180    2.  Hyperlipidemia - goal LDL < 70  PLease resume statin once LFTs back to baseline.    3.  Obesity - outpatient medical management.      4.  Hypothyroidism - continue levothyroxine 50mcg once daily    Patient can followup with  after discharge  Case seen and discussed with  and updated the primary team.

## 2020-04-23 NOTE — PROGRESS NOTE ADULT - PROBLEM SELECTOR PLAN 2
Decompensated cirrhosis likely 2/2 alcohol use, c/b ascites, hepatic encephalopathy on admission (resolved)  - CT abd/pelvis with persistent large ascites and peritonitis; bilateral pleural effusions and pericardial effusion  - MELD-Na score 28  - s/p paracentesis on 4/10 with negative cytology but not sent for cell count/culture, however was on zosyn for 2 weeks so GI with no concerns for SBP   - repeat paracentesis 4/19, but unable to aspirate enough fluid?   - SAAG 1.4 consistent with portal hypertension as etiology of ascites  - continue rifaximin 550mg BID (changed from lactulose) for hepatic encephalopathy   - per GI, can restart lactulose and IV PPI BID can be changed to PO pantoprazole 40mg BID   - unclear if pt with varices; GI recommending outpatient EGD to evaluate if pt with varices   - pt refusing video capsule endoscopy this admission; explained risks of not undergoing procedure and pt understands and repeats back risks of refusing procedure.   - pt needs screening for HCC outpatient and follow up with hepatology outpatient    #r/o SBP   SAAG 1.4 consistent with portal hypertension as etiology of SBP   - ID following, appreciate recs   - ID recommending observe off abx (previously on meropenem 4/20-4/21)   - if GI concerned for active variceal bleed (not currently at this time), ID recommends restarting IV CTX 1g q24h ppx

## 2020-04-23 NOTE — CHART NOTE - NSCHARTNOTEFT_GEN_A_CORE
Admitting Diagnosis:   Patient is a 60y old  Male who presents with a chief complaint of Colectomy (19 Apr 2020 10:46)      PAST MEDICAL & SURGICAL HISTORY:  Anemia  ETOH abuse  HLD (hyperlipidemia)  HTN (hypertension)  DM (diabetes mellitus)  No significant past surgical history      Current Nutrition Order:[facility] with no snack/Clears       PO Intake: Good (%) [   ]  Fair (50-75%) [  x ] Poor (<25%) [   ]requesting diet advancement    GI Issues: With Ileostomywound VAC    Pain:none    Skin Integrity: No PU.With wound VAC    Labs:   04-23    136  |  100  |  36<H>  ----------------------------<  138<H>  4.1   |  22  |  5.82<H>    Ca    8.2<L>      23 Apr 2020 06:16  Phos  3.5     04-23  Mg     1.7     04-23    TPro  6.5  /  Alb  3.1<L>  /  TBili  0.3  /  DBili  x   /  AST  15  /  ALT  6<L>  /  AlkPhos  94  04-23    CAPILLARY BLOOD GLUCOSE      POCT Blood Glucose.: 228 mg/dL (23 Apr 2020 08:01)  POCT Blood Glucose.: 281 mg/dL (22 Apr 2020 22:24)  POCT Blood Glucose.: 197 mg/dL (22 Apr 2020 17:28)  POCT Blood Glucose.: 230 mg/dL (22 Apr 2020 11:58)      Medications:  MEDICATIONS  (STANDING):  atorvastatin 10 milliGRAM(s) Oral at bedtime  chlorhexidine 2% Cloths 1 Application(s) Topical daily  dextrose 5%. 1000 milliLiter(s) (50 mL/Hr) IV Continuous <Continuous>  dextrose 50% Injectable 12.5 Gram(s) IV Push once  dextrose 50% Injectable 25 Gram(s) IV Push once  dextrose 50% Injectable 25 Gram(s) IV Push once  heparin   Injectable 7500 Unit(s) SubCutaneous every 8 hours  insulin glargine Injectable (LANTUS) 7 Unit(s) SubCutaneous at bedtime  insulin lispro (HumaLOG) corrective regimen sliding scale   SubCutaneous Before meals and at bedtime  insulin lispro Injectable (HumaLOG) 2 Unit(s) SubCutaneous three times a day before meals  lactulose Syrup 10 Gram(s) Oral three times a day  levothyroxine 50 MICROGram(s) Oral daily  pantoprazole    Tablet 40 milliGRAM(s) Oral every 12 hours  rifAXIMin 550 milliGRAM(s) Oral two times a day    MEDICATIONS  (PRN):  acetaminophen   Tablet .. 500 milliGRAM(s) Oral every 6 hours PRN Temp greater or equal to 38C (100.4F), Mild Pain (1 - 3)  dextrose 40% Gel 15 Gram(s) Oral once PRN Blood Glucose LESS THAN 70 milliGRAM(s)/deciliter  glucagon  Injectable 1 milliGRAM(s) IntraMuscular once PRN Glucose LESS THAN 70 milligrams/deciliter  traZODone 50 milliGRAM(s) Oral at bedtime PRN Sleep      Weight:90.7k  Daily     Daily     Weight Change: No updated weights Stated UBW:67.3kg    Estimated energy needs: IBW used for calculations due to above 120% of IBW.IBW:67.3kg [id number]:1682-2019kcal and 1.2-1.4gmprotein:81-94mprotein and fluids per team     Subjective: 59y/o male with history of HTN,2DM,Neuropathy,ETOH with  liver Cirrhosis, presents with pneumatosis of unknown etiology s/p sub-total colectomy and ileostomy. Found to be COVID-19+Patient from shelter. With wound VAC .Remains on clears requesting diet advancement to low solid foods    Previous Nutrition Diagnosis :Increased nutrient needs r/t Post-op demands    Active [ x  ]  Resolved [   ]    If resolved, new PES:     Goal: Meet 80% of needs consistently    Recommendations:1.Advance diet to C-CHO with snack/DASH/low fiber 2.Updated weights    Education: previously educated    Risk Level: High [   ] Moderate [ x  ] Low [   ]

## 2020-04-23 NOTE — PROGRESS NOTE ADULT - ASSESSMENT
59 y/o man with MEG and covid +   presented to [facility] for fall at home. At [facility] patient reported that he takes lactulose at home daily and has daily BM, but he had no BM for 5 days prior to admission. 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 Patient underwent ex lap and subtotal colectomy, and ileostomy on 3/31.  h/o HTN, DM complicated by neuropathy, IVDU, ETOH liver cirrhosis.    -Nonoliguric MEG (acute kidney injury) likely ATN  creat now beginning to drop.  Still needs IVF  urine output much better  aoid ACE/ARB/NSAIDS/Metformin/Contrast  c/w strict i/o  Na, K, CO2 all acceptable today

## 2020-04-23 NOTE — PROGRESS NOTE ADULT - PROBLEM SELECTOR PLAN 5
Normocytic anemia. Likely 2/2 anemia of chronic disease and kidney dysfunction vs acute blood loss. Hgb has been stable for the past couple of days with no evidence of acute GI bleeding   - s/p 3U pRBCs total this admission   - continue po pantoprazole 40mg BID   - monitor for signs of bleeding   - GI following and planned for video capsule endoscopy this admission, but pt refused

## 2020-04-23 NOTE — PROGRESS NOTE ADULT - SUBJECTIVE AND OBJECTIVE BOX
Patient seen and reviewed with med team  comfortable this AM      T(C): , Max: 37.2 (20 @ 21:10)  T(F): , Max: 98.9 (20 @ 21:10)  HR: 81 (20 @ 06:08)  BP: 164/74 (20 @ 06:08)  BP(mean): --  RR: 19 (20 @ 06:08)  SpO2: 95% (20 @ 06:08)  Wt(kg): --     @ 07:01  -   @ 07:00  --------------------------------------------------------  IN:  Total IN: 0 mL    OUT:    Ileostomy: 1850 mL    Voided: 1150 mL  Total OUT: 3000 mL    Total NET: -3000 mL       @ 07:01  -   @ 14:31  --------------------------------------------------------  IN:  Total IN: 0 mL    OUT:    Ileostomy: 750 mL  Total OUT: 750 mL    Total NET: -750 mL            atorvastatin 10 at bedtime  chlorhexidine 2% Cloths 1 daily  dextrose 5%. 1000 <Continuous>  dextrose 50% Injectable 12.5 once  dextrose 50% Injectable 25 once  dextrose 50% Injectable 25 once  diphenoxylate/atropine 1 daily  heparin   Injectable 7500 every 8 hours  insulin glargine Injectable (LANTUS) 7 at bedtime  insulin lispro (HumaLOG) corrective regimen sliding scale  Before meals and at bedtime  insulin lispro Injectable (HumaLOG) 2 three times a day before meals  lactulose Syrup 10 three times a day  levothyroxine 50 daily  pantoprazole    Tablet 40 every 12 hours  rifAXIMin 550 two times a day    Allergies    No Known Allergies    Intolerances      PHYSICAL EXAM:    LABS:                        7.4    17.13 )-----------( 342      ( 2020 06:16 )             22.4         136  |  100  |  36<H>  ----------------------------<  138<H>  4.1   |  22  |  5.82<H>    Ca    8.2<L>      2020 06:16  Phos  3.5       Mg     1.7         TPro  6.5  /  Alb  3.1<L>  /  TBili  0.3  /  DBili  x   /  AST  15  /  ALT  6<L>  /  AlkPhos  94          Urinalysis Basic - ( 2020 13:14 )    Color: Yellow / Appearance: Clear / S.015 / pH: x  Gluc: x / Ketone: NEGATIVE  / Bili: Negative / Urobili: 0.2 E.U./dL   Blood: x / Protein: 30 mg/dL / Nitrite: NEGATIVE   Leuk Esterase: NEGATIVE / RBC: < 5 /HPF / WBC < 5 /HPF   Sq Epi: x / Non Sq Epi: 0-5 /HPF / Bacteria: Present /HPF      Creatinine, Random Urine: 86 mg/dL ( @ 13:14)  Sodium, Random Urine: 35 mmol/L ( @ 13:14)  Osmolality, Random Urine: 282 mosmol/kg ( @ 13:14)        RADIOLOGY & ADDITIONAL STUDIES:

## 2020-04-24 LAB
ALBUMIN SERPL ELPH-MCNC: 3 G/DL — LOW (ref 3.3–5)
ALP SERPL-CCNC: 95 U/L — SIGNIFICANT CHANGE UP (ref 40–120)
ALT FLD-CCNC: 7 U/L — LOW (ref 10–45)
ANION GAP SERPL CALC-SCNC: 16 MMOL/L — SIGNIFICANT CHANGE UP (ref 5–17)
AST SERPL-CCNC: 17 U/L — SIGNIFICANT CHANGE UP (ref 10–40)
BASOPHILS # BLD AUTO: 0.02 K/UL — SIGNIFICANT CHANGE UP (ref 0–0.2)
BASOPHILS NFR BLD AUTO: 0.1 % — SIGNIFICANT CHANGE UP (ref 0–2)
BILIRUB SERPL-MCNC: 0.3 MG/DL — SIGNIFICANT CHANGE UP (ref 0.2–1.2)
BUN SERPL-MCNC: 34 MG/DL — HIGH (ref 7–23)
CALCIUM SERPL-MCNC: 8.4 MG/DL — SIGNIFICANT CHANGE UP (ref 8.4–10.5)
CHLORIDE SERPL-SCNC: 99 MMOL/L — SIGNIFICANT CHANGE UP (ref 96–108)
CO2 SERPL-SCNC: 19 MMOL/L — LOW (ref 22–31)
CREAT SERPL-MCNC: 5.2 MG/DL — HIGH (ref 0.5–1.3)
CRP SERPL-MCNC: 16.26 MG/DL — HIGH (ref 0–0.4)
D DIMER BLD IA.RAPID-MCNC: 2826 NG/ML DDU — HIGH
EOSINOPHIL # BLD AUTO: 0.24 K/UL — SIGNIFICANT CHANGE UP (ref 0–0.5)
EOSINOPHIL NFR BLD AUTO: 1.5 % — SIGNIFICANT CHANGE UP (ref 0–6)
FERRITIN SERPL-MCNC: 701 NG/ML — HIGH (ref 30–400)
GLUCOSE BLDC GLUCOMTR-MCNC: 151 MG/DL — HIGH (ref 70–99)
GLUCOSE BLDC GLUCOMTR-MCNC: 185 MG/DL — HIGH (ref 70–99)
GLUCOSE BLDC GLUCOMTR-MCNC: 228 MG/DL — HIGH (ref 70–99)
GLUCOSE BLDC GLUCOMTR-MCNC: 233 MG/DL — HIGH (ref 70–99)
GLUCOSE SERPL-MCNC: 193 MG/DL — HIGH (ref 70–99)
HCT VFR BLD CALC: 21.7 % — LOW (ref 39–50)
HCT VFR BLD CALC: 23.9 % — LOW (ref 39–50)
HCT VFR BLD CALC: 24.7 % — LOW (ref 39–50)
HGB BLD-MCNC: 6.9 G/DL — CRITICAL LOW (ref 13–17)
HGB BLD-MCNC: 7.7 G/DL — LOW (ref 13–17)
HGB BLD-MCNC: 8.1 G/DL — LOW (ref 13–17)
IMM GRANULOCYTES NFR BLD AUTO: 2.1 % — HIGH (ref 0–1.5)
LYMPHOCYTES # BLD AUTO: 0.7 K/UL — LOW (ref 1–3.3)
LYMPHOCYTES # BLD AUTO: 4.5 % — LOW (ref 13–44)
MAGNESIUM SERPL-MCNC: 1.7 MG/DL — SIGNIFICANT CHANGE UP (ref 1.6–2.6)
MCHC RBC-ENTMCNC: 28.3 PG — SIGNIFICANT CHANGE UP (ref 27–34)
MCHC RBC-ENTMCNC: 28.6 PG — SIGNIFICANT CHANGE UP (ref 27–34)
MCHC RBC-ENTMCNC: 28.9 PG — SIGNIFICANT CHANGE UP (ref 27–34)
MCHC RBC-ENTMCNC: 31.8 GM/DL — LOW (ref 32–36)
MCHC RBC-ENTMCNC: 32.2 GM/DL — SIGNIFICANT CHANGE UP (ref 32–36)
MCHC RBC-ENTMCNC: 32.8 GM/DL — SIGNIFICANT CHANGE UP (ref 32–36)
MCV RBC AUTO: 88.2 FL — SIGNIFICANT CHANGE UP (ref 80–100)
MCV RBC AUTO: 88.8 FL — SIGNIFICANT CHANGE UP (ref 80–100)
MCV RBC AUTO: 88.9 FL — SIGNIFICANT CHANGE UP (ref 80–100)
MONOCYTES # BLD AUTO: 0.99 K/UL — HIGH (ref 0–0.9)
MONOCYTES NFR BLD AUTO: 6.4 % — SIGNIFICANT CHANGE UP (ref 2–14)
NEUTROPHILS # BLD AUTO: 13.25 K/UL — HIGH (ref 1.8–7.4)
NEUTROPHILS NFR BLD AUTO: 85.4 % — HIGH (ref 43–77)
NRBC # BLD: 0 /100 WBCS — SIGNIFICANT CHANGE UP (ref 0–0)
PHOSPHATE SERPL-MCNC: 2.5 MG/DL — SIGNIFICANT CHANGE UP (ref 2.5–4.5)
PLATELET # BLD AUTO: 305 K/UL — SIGNIFICANT CHANGE UP (ref 150–400)
PLATELET # BLD AUTO: 314 K/UL — SIGNIFICANT CHANGE UP (ref 150–400)
PLATELET # BLD AUTO: 321 K/UL — SIGNIFICANT CHANGE UP (ref 150–400)
POTASSIUM SERPL-MCNC: 4.1 MMOL/L — SIGNIFICANT CHANGE UP (ref 3.5–5.3)
POTASSIUM SERPL-SCNC: 4.1 MMOL/L — SIGNIFICANT CHANGE UP (ref 3.5–5.3)
PROT SERPL-MCNC: 6.6 G/DL — SIGNIFICANT CHANGE UP (ref 6–8.3)
RBC # BLD: 2.44 M/UL — LOW (ref 4.2–5.8)
RBC # BLD: 2.69 M/UL — LOW (ref 4.2–5.8)
RBC # BLD: 2.8 M/UL — LOW (ref 4.2–5.8)
RBC # FLD: 13.9 % — SIGNIFICANT CHANGE UP (ref 10.3–14.5)
RBC # FLD: 14.3 % — SIGNIFICANT CHANGE UP (ref 10.3–14.5)
RBC # FLD: 14.3 % — SIGNIFICANT CHANGE UP (ref 10.3–14.5)
SODIUM SERPL-SCNC: 134 MMOL/L — LOW (ref 135–145)
WBC # BLD: 15.52 K/UL — HIGH (ref 3.8–10.5)
WBC # BLD: 17.33 K/UL — HIGH (ref 3.8–10.5)
WBC # BLD: 17.73 K/UL — HIGH (ref 3.8–10.5)
WBC # FLD AUTO: 15.52 K/UL — HIGH (ref 3.8–10.5)
WBC # FLD AUTO: 17.33 K/UL — HIGH (ref 3.8–10.5)
WBC # FLD AUTO: 17.73 K/UL — HIGH (ref 3.8–10.5)

## 2020-04-24 PROCEDURE — 93010 ELECTROCARDIOGRAM REPORT: CPT

## 2020-04-24 PROCEDURE — 99232 SBSQ HOSP IP/OBS MODERATE 35: CPT

## 2020-04-24 PROCEDURE — 99233 SBSQ HOSP IP/OBS HIGH 50: CPT | Mod: GC

## 2020-04-24 RX ORDER — INSULIN GLARGINE 100 [IU]/ML
8 INJECTION, SOLUTION SUBCUTANEOUS AT BEDTIME
Refills: 0 | Status: DISCONTINUED | OUTPATIENT
Start: 2020-04-24 | End: 2020-05-01

## 2020-04-24 RX ORDER — SODIUM BICARBONATE 1 MEQ/ML
650 SYRINGE (ML) INTRAVENOUS THREE TIMES A DAY
Refills: 0 | Status: DISCONTINUED | OUTPATIENT
Start: 2020-04-24 | End: 2020-06-01

## 2020-04-24 RX ORDER — ONDANSETRON 8 MG/1
4 TABLET, FILM COATED ORAL ONCE
Refills: 0 | Status: COMPLETED | OUTPATIENT
Start: 2020-04-24 | End: 2020-04-24

## 2020-04-24 RX ORDER — DIPHENOXYLATE HCL/ATROPINE 2.5-.025MG
2 TABLET ORAL EVERY 6 HOURS
Refills: 0 | Status: DISCONTINUED | OUTPATIENT
Start: 2020-04-24 | End: 2020-04-24

## 2020-04-24 RX ORDER — INSULIN LISPRO 100/ML
4 VIAL (ML) SUBCUTANEOUS
Refills: 0 | Status: DISCONTINUED | OUTPATIENT
Start: 2020-04-24 | End: 2020-05-04

## 2020-04-24 RX ADMIN — Medication 500 MILLIGRAM(S): at 07:50

## 2020-04-24 RX ADMIN — HEPARIN SODIUM 7500 UNIT(S): 5000 INJECTION INTRAVENOUS; SUBCUTANEOUS at 13:48

## 2020-04-24 RX ADMIN — LACTULOSE 10 GRAM(S): 10 SOLUTION ORAL at 04:45

## 2020-04-24 RX ADMIN — CHLORHEXIDINE GLUCONATE 1 APPLICATION(S): 213 SOLUTION TOPICAL at 12:15

## 2020-04-24 RX ADMIN — Medication 4: at 11:48

## 2020-04-24 RX ADMIN — INSULIN GLARGINE 8 UNIT(S): 100 INJECTION, SOLUTION SUBCUTANEOUS at 22:44

## 2020-04-24 RX ADMIN — AMLODIPINE BESYLATE 10 MILLIGRAM(S): 2.5 TABLET ORAL at 05:06

## 2020-04-24 RX ADMIN — Medication 3 UNIT(S): at 09:03

## 2020-04-24 RX ADMIN — LACTULOSE 10 GRAM(S): 10 SOLUTION ORAL at 13:49

## 2020-04-24 RX ADMIN — ATORVASTATIN CALCIUM 10 MILLIGRAM(S): 80 TABLET, FILM COATED ORAL at 22:45

## 2020-04-24 RX ADMIN — Medication 2: at 09:02

## 2020-04-24 RX ADMIN — Medication 500 MILLIGRAM(S): at 22:43

## 2020-04-24 RX ADMIN — Medication 50 MICROGRAM(S): at 04:45

## 2020-04-24 RX ADMIN — PANTOPRAZOLE SODIUM 40 MILLIGRAM(S): 20 TABLET, DELAYED RELEASE ORAL at 04:45

## 2020-04-24 RX ADMIN — Medication 2 TABLET(S): at 12:15

## 2020-04-24 RX ADMIN — ONDANSETRON 4 MILLIGRAM(S): 8 TABLET, FILM COATED ORAL at 07:49

## 2020-04-24 RX ADMIN — Medication 50 MILLIGRAM(S): at 22:43

## 2020-04-24 RX ADMIN — HEPARIN SODIUM 7500 UNIT(S): 5000 INJECTION INTRAVENOUS; SUBCUTANEOUS at 22:44

## 2020-04-24 RX ADMIN — Medication 500 MILLIGRAM(S): at 13:50

## 2020-04-24 RX ADMIN — Medication 2: at 22:44

## 2020-04-24 RX ADMIN — Medication 650 MILLIGRAM(S): at 22:43

## 2020-04-24 RX ADMIN — HEPARIN SODIUM 7500 UNIT(S): 5000 INJECTION INTRAVENOUS; SUBCUTANEOUS at 04:47

## 2020-04-24 RX ADMIN — Medication 4: at 17:50

## 2020-04-24 RX ADMIN — Medication 3 UNIT(S): at 11:48

## 2020-04-24 RX ADMIN — PANTOPRAZOLE SODIUM 40 MILLIGRAM(S): 20 TABLET, DELAYED RELEASE ORAL at 17:48

## 2020-04-24 NOTE — PROGRESS NOTE ADULT - ASSESSMENT
59 y/o M with PMH of HTN, DM complicated by neuropathy, IVDU, ETOH liver cirrhosis, who presents to [facility] for fall at home. At [facility] patient reported that he takes lactulose at home daily and has daily BM, but he had no BM for 5 days prior to admission. 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 Patient underwent ex lap and subtotal colectomy, and ileostomy on 3/31.  COVID +    # Nonoliguric MEG (acute kidney injury) likely ATN  - Ddx:  likely combination of pre-renal, intrinsic cause due to covid19 infection, contrast exposure on 4/10 vs vancomycin toxicity  - Saturating AT 97% on 2 L NC  - Cr/BUN downtrending to 5.2/34  -  CC/24 HRS  - ileostomy output 1.5 L  - net even fluid balance  - continue gentle iv hydration if high GI output  - avoid ACE/ARB/NSAIDS/Metformin/Contrast  - strict i/o    # Hyponatremia 134  - Likely due to high GI output   - suggest gently hydration with NS    # metabolic acidosis - 19   - suggest bicarb tabs 650 mg po tid

## 2020-04-24 NOTE — PROGRESS NOTE ADULT - ATTENDING COMMENTS
reviewed with med team  MEG multi factorial- with pre renal component as well   Creat continues to drop  as above, keep well hydrated to match GI losses

## 2020-04-24 NOTE — PROGRESS NOTE ADULT - PROBLEM SELECTOR PLAN 3
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   - s/p laparotomy with wound vac in place (gen surg following)   - ileostomy in place draining brown stool with no signs of bleeding  - general surgery following, appreciate recs   - gen surg recommending lomotil  - wound changes: T/Th/Sat    #wound vac  - would care NP following and continue teaching to patient on wound vac care and instructions  - will need wound care on discharge

## 2020-04-24 NOTE — PROGRESS NOTE ADULT - SUBJECTIVE AND OBJECTIVE BOX
Patient seen and examined at bedside as per COVID protocol.  Meds, vitals, labs, imaging reviewed.        Medications:    acetaminophen   Tablet .. 500 milliGRAM(s) every 6 hours PRN  amLODIPine   Tablet 10 milliGRAM(s) daily  atorvastatin 10 milliGRAM(s) at bedtime  chlorhexidine 2% Cloths 1 Application(s) daily  dextrose 40% Gel 15 Gram(s) once PRN  dextrose 5%. 1000 milliLiter(s) <Continuous>  dextrose 50% Injectable 12.5 Gram(s) once  dextrose 50% Injectable 25 Gram(s) once  dextrose 50% Injectable 25 Gram(s) once  diphenoxylate/atropine 2 Tablet(s) every 6 hours  glucagon  Injectable 1 milliGRAM(s) once PRN  heparin   Injectable 7500 Unit(s) every 8 hours  insulin glargine Injectable (LANTUS) 7 Unit(s) at bedtime  insulin lispro (HumaLOG) corrective regimen sliding scale   Before meals and at bedtime  insulin lispro Injectable (HumaLOG) 3 Unit(s) three times a day before meals  lactulose Syrup 10 Gram(s) three times a day  levothyroxine 50 MICROGram(s) daily  pantoprazole    Tablet 40 milliGRAM(s) every 12 hours  rifAXIMin 550 milliGRAM(s) two times a day  traZODone 50 milliGRAM(s) at bedtime PRN      Allergies    No Known Allergies    Intolerances        T(C): , Max: 37.7 (20 @ 21:12)  T(F): , Max: 99.9 (20 @ 21:12)  HR: 87 (20 @ 11:00)  BP: 158/78 (20 @ 11:00)  BP(mean): --  RR: 18 (20 @ 11:00)  SpO2: 95% (20 @ 11:00)  Wt(kg): --     @ 07:01  -   @ 07:00  --------------------------------------------------------  IN:  Total IN: 0 mL    OUT:    Ileostomy: 1550 mL    Voided: 500 mL  Total OUT: 2050 mL    Total NET: -2050 mL       @ 07:01  -   @ 13:05  --------------------------------------------------------  IN:  Total IN: 0 mL    OUT:    Ileostomy: 700 mL  Total OUT: 700 mL    Total NET: -700 mL            ROS: Unable due to limited contact    Physical exam as per primary team due to COVID protocol      LABS:                        7.7    17.73 )-----------( 314      ( 2020 10:16 )             23.9         134<L>  |  99  |  34<H>  ----------------------------<  193<H>  4.1   |  19<L>  |  5.20<H>    Ca    8.4      2020 07:25  Phos  2.5       Mg     1.7         TPro  6.6  /  Alb  3.0<L>  /  TBili  0.3  /  DBili  x   /  AST  17  /  ALT  7<L>  /  AlkPhos  95          Urinalysis Basic - ( 2020 13:14 )    Color: Yellow / Appearance: Clear / S.015 / pH: x  Gluc: x / Ketone: NEGATIVE  / Bili: Negative / Urobili: 0.2 E.U./dL   Blood: x / Protein: 30 mg/dL / Nitrite: NEGATIVE   Leuk Esterase: NEGATIVE / RBC: < 5 /HPF / WBC < 5 /HPF   Sq Epi: x / Non Sq Epi: 0-5 /HPF / Bacteria: Present /HPF      Creatinine, Random Urine: 86 mg/dL ( @ 13:14)  Sodium, Random Urine: 35 mmol/L ( @ 13:14)        RADIOLOGY & ADDITIONAL STUDIES:

## 2020-04-24 NOTE — PROGRESS NOTE ADULT - ASSESSMENT
61 y/o M with PMH of HTN, DM complicated by neuropathy, IVDU, ETOH liver cirrhosis, who presents to [facility] for fall at home; found to have toxic megacolon and pneumatosis; underwent ex lap and subtotal colectomy, and ileostomy on 3/31. Developed COVID 19 while in the hospital, but subsequent test was positive, patient was transferred to tele COVID unit without need for supplemental oxygen. Now on 2L NC. Developed worsening MEG with low suspicion for hepatorenal syndrome. Has general surgery managing would vac.

## 2020-04-24 NOTE — PROGRESS NOTE ADULT - PROBLEM SELECTOR PLAN 5
Normocytic anemia. Likely 2/2 anemia of chronic disease and kidney dysfunction vs acute blood loss. Hgb 6.9 4/24, ileostomy output with brown stool   - will transfuse 1U pRBC 4/24; recheck cbc later  - s/p 4U pRBCs total this admission as of 4/24  - continue po pantoprazole 40mg BID   - monitor for signs of bleeding   - GI following and planned for video capsule endoscopy this admission, but pt refused

## 2020-04-24 NOTE — PROGRESS NOTE ADULT - SUBJECTIVE AND OBJECTIVE BOX
INTERVAL HPI/OVERNIGHT EVENTS: NAEO. This AM    MEDICATIONS  (STANDING):  amLODIPine   Tablet 10 milliGRAM(s) Oral daily  atorvastatin 10 milliGRAM(s) Oral at bedtime  chlorhexidine 2% Cloths 1 Application(s) Topical daily  dextrose 5%. 1000 milliLiter(s) (50 mL/Hr) IV Continuous <Continuous>  dextrose 50% Injectable 12.5 Gram(s) IV Push once  dextrose 50% Injectable 25 Gram(s) IV Push once  dextrose 50% Injectable 25 Gram(s) IV Push once  diphenoxylate/atropine 1 Tablet(s) Oral daily  heparin   Injectable 7500 Unit(s) SubCutaneous every 8 hours  insulin glargine Injectable (LANTUS) 7 Unit(s) SubCutaneous at bedtime  insulin lispro (HumaLOG) corrective regimen sliding scale   SubCutaneous Before meals and at bedtime  insulin lispro Injectable (HumaLOG) 3 Unit(s) SubCutaneous three times a day before meals  lactulose Syrup 10 Gram(s) Oral three times a day  levothyroxine 50 MICROGram(s) Oral daily  ondansetron Injectable 4 milliGRAM(s) IV Push once  pantoprazole    Tablet 40 milliGRAM(s) Oral every 12 hours  rifAXIMin 550 milliGRAM(s) Oral two times a day    MEDICATIONS  (PRN):  acetaminophen   Tablet .. 500 milliGRAM(s) Oral every 6 hours PRN Temp greater or equal to 38C (100.4F), Mild Pain (1 - 3)  dextrose 40% Gel 15 Gram(s) Oral once PRN Blood Glucose LESS THAN 70 milliGRAM(s)/deciliter  glucagon  Injectable 1 milliGRAM(s) IntraMuscular once PRN Glucose LESS THAN 70 milligrams/deciliter  traZODone 50 milliGRAM(s) Oral at bedtime PRN Sleep      Vital Signs Last 24 Hrs  T(C): 37.3 (2020 05:50), Max: 37.7 (2020 21:12)  T(F): 99.2 (2020 05:50), Max: 99.9 (2020 21:12)  HR: 90 (2020 05:50) (80 - 90)  BP: 166/73 (2020 05:50) (117/66 - 166/73)  BP(mean): --  RR: 20 (2020 05:50) (18 - 20)  SpO2: 95% (2020 05:50) (95% - 96%)    PHYSICAL EXAM:      Constitutional: A&Ox3    Respiratory: non labored breathing, no respiratory distress    Cardiovascular: NSR, RRR    Gastrointestinal:                 Incision:    Genitourinary:    Extremities: (-) edema                  I&O's Detail    2020 07:01  -  2020 07:00  --------------------------------------------------------  IN:  Total IN: 0 mL    OUT:    Ileostomy: 1850 mL    Voided: 1150 mL  Total OUT: 3000 mL    Total NET: -3000 mL      2020 07:01  -  2020 06:38  --------------------------------------------------------  IN:  Total IN: 0 mL    OUT:    Ileostomy: 1550 mL  Total OUT: 1550 mL    Total NET: -1550 mL          LABS:                        7.4    17.13 )-----------( 342      ( 2020 06:16 )             22.4     04-23    136  |  100  |  36<H>  ----------------------------<  138<H>  4.1   |  22  |  5.82<H>    Ca    8.2<L>      2020 06:16  Phos  3.5       Mg     1.7         TPro  6.5  /  Alb  3.1<L>  /  TBili  0.3  /  DBili  x   /  AST  15  /  ALT  6<L>  /  AlkPhos  94  23      Urinalysis Basic - ( 2020 13:14 )    Color: Yellow / Appearance: Clear / S.015 / pH: x  Gluc: x / Ketone: NEGATIVE  / Bili: Negative / Urobili: 0.2 E.U./dL   Blood: x / Protein: 30 mg/dL / Nitrite: NEGATIVE   Leuk Esterase: NEGATIVE / RBC: < 5 /HPF / WBC < 5 /HPF   Sq Epi: x / Non Sq Epi: 0-5 /HPF / Bacteria: Present /HPF        RADIOLOGY & ADDITIONAL STUDIES: INTERVAL HPI/OVERNIGHT EVENTS: NAEO. This AM pt states that he is feeling dizzy and nauseous. Does not have any other complaints. Denies cp/sob. Endorses gas and solid output from ostomy.     MEDICATIONS  (STANDING):  amLODIPine   Tablet 10 milliGRAM(s) Oral daily  atorvastatin 10 milliGRAM(s) Oral at bedtime  chlorhexidine 2% Cloths 1 Application(s) Topical daily  dextrose 5%. 1000 milliLiter(s) (50 mL/Hr) IV Continuous <Continuous>  dextrose 50% Injectable 12.5 Gram(s) IV Push once  dextrose 50% Injectable 25 Gram(s) IV Push once  dextrose 50% Injectable 25 Gram(s) IV Push once  diphenoxylate/atropine 1 Tablet(s) Oral daily  heparin   Injectable 7500 Unit(s) SubCutaneous every 8 hours  insulin glargine Injectable (LANTUS) 7 Unit(s) SubCutaneous at bedtime  insulin lispro (HumaLOG) corrective regimen sliding scale   SubCutaneous Before meals and at bedtime  insulin lispro Injectable (HumaLOG) 3 Unit(s) SubCutaneous three times a day before meals  lactulose Syrup 10 Gram(s) Oral three times a day  levothyroxine 50 MICROGram(s) Oral daily  ondansetron Injectable 4 milliGRAM(s) IV Push once  pantoprazole    Tablet 40 milliGRAM(s) Oral every 12 hours  rifAXIMin 550 milliGRAM(s) Oral two times a day    MEDICATIONS  (PRN):  acetaminophen   Tablet .. 500 milliGRAM(s) Oral every 6 hours PRN Temp greater or equal to 38C (100.4F), Mild Pain (1 - 3)  dextrose 40% Gel 15 Gram(s) Oral once PRN Blood Glucose LESS THAN 70 milliGRAM(s)/deciliter  glucagon  Injectable 1 milliGRAM(s) IntraMuscular once PRN Glucose LESS THAN 70 milligrams/deciliter  traZODone 50 milliGRAM(s) Oral at bedtime PRN Sleep      Vital Signs Last 24 Hrs  T(C): 37.3 (2020 05:50), Max: 37.7 (2020 21:12)  T(F): 99.2 (2020 05:50), Max: 99.9 (2020 21:12)  HR: 90 (2020 05:50) (80 - 90)  BP: 166/73 (2020 05:50) (117/66 - 166/73)  BP(mean): --  RR: 20 (2020 05:50) (18 - 20)  SpO2: 95% (2020 05:50) (95% - 96%)    PHYSICAL EXAM:      Constitutional: A&Ox3    Respiratory: non labored breathing, no respiratory distress    Cardiovascular: NSR, RRR    Gastrointestinal: soft, distended, non-tender. Wound vac reinforced given leak at inferior border. Ostomy with gas and stool in bag.       Extremities: (-) edema                  I&O's Detail    2020 07:01  -  2020 07:00  --------------------------------------------------------  IN:  Total IN: 0 mL    OUT:    Ileostomy: 1850 mL    Voided: 1150 mL  Total OUT: 3000 mL    Total NET: -3000 mL      2020 07:01  -  2020 06:38  --------------------------------------------------------  IN:  Total IN: 0 mL    OUT:    Ileostomy: 1550 mL  Total OUT: 1550 mL    Total NET: -1550 mL          LABS:                        7.4    17.13 )-----------( 342      ( 2020 06:16 )             22.4     04-23    136  |  100  |  36<H>  ----------------------------<  138<H>  4.1   |  22  |  5.82<H>    Ca    8.2<L>      2020 06:16  Phos  3.5     04-  Mg     1.7         TPro  6.5  /  Alb  3.1<L>  /  TBili  0.3  /  DBili  x   /  AST  15  /  ALT  6<L>  /  AlkPhos  94  0423      Urinalysis Basic - ( 2020 13:14 )    Color: Yellow / Appearance: Clear / S.015 / pH: x  Gluc: x / Ketone: NEGATIVE  / Bili: Negative / Urobili: 0.2 E.U./dL   Blood: x / Protein: 30 mg/dL / Nitrite: NEGATIVE   Leuk Esterase: NEGATIVE / RBC: < 5 /HPF / WBC < 5 /HPF   Sq Epi: x / Non Sq Epi: 0-5 /HPF / Bacteria: Present /HPF        RADIOLOGY & ADDITIONAL STUDIES:

## 2020-04-24 NOTE — PROGRESS NOTE ADULT - SUBJECTIVE AND OBJECTIVE BOX
OVERNIGHT EVENTS: Nausea and pain overnight.     SUBJECTIVE / INTERVAL HPI: Patient seen and examined at bedside. This AM, pt having nausea and dizziness; given zofran and 1U pRBC for Hgb 6.9. Reports that he would like to go to [place] for rehab. Pt endorsing tiredness and dizziness when sitting up; thinks that he cannot walk.     VITAL SIGNS:  Vital Signs Last 24 Hrs  T(C): 36.9 (2020 08:11), Max: 37.7 (2020 21:12)  T(F): 98.5 (2020 08:11), Max: 99.9 (2020 21:12)  HR: 87 (2020 08:11) (80 - 90)  BP: 159/72 (2020 08:11) (117/66 - 166/73)  BP(mean): --  RR: 18 (2020 08:11) (18 - 20)  SpO2: 94% (2020 08:11) (94% - 96%)    PHYSICAL EXAM:    General: middle aged male in no acute distress, grouchy demeanor, breathing comfortably on RA  HEENT: NC/AT; PERRL, anicteric sclera; MMM  Respiratory: no increased work of breathing with no accessory muscle use; saturating 91% on RA and 93-95% on 2L   Gastrointestinal: distended, mildly firm, mildly tender diffusely; with wound vanc covering surgical wound, clean dry and intact; ileostomy with dark brown nonmelanotic liquid stool   Extremities: WWP; no edema, clubbing or cyanosis  Vascular: 2+ radial, DP/PT pulses B/L  Neurological: AAOx3; no focal deficits    MEDICATIONS:  MEDICATIONS  (STANDING):  amLODIPine   Tablet 10 milliGRAM(s) Oral daily  atorvastatin 10 milliGRAM(s) Oral at bedtime  chlorhexidine 2% Cloths 1 Application(s) Topical daily  dextrose 5%. 1000 milliLiter(s) (50 mL/Hr) IV Continuous <Continuous>  dextrose 50% Injectable 12.5 Gram(s) IV Push once  dextrose 50% Injectable 25 Gram(s) IV Push once  dextrose 50% Injectable 25 Gram(s) IV Push once  diphenoxylate/atropine 2 Tablet(s) Oral every 6 hours  heparin   Injectable 7500 Unit(s) SubCutaneous every 8 hours  insulin glargine Injectable (LANTUS) 7 Unit(s) SubCutaneous at bedtime  insulin lispro (HumaLOG) corrective regimen sliding scale   SubCutaneous Before meals and at bedtime  insulin lispro Injectable (HumaLOG) 3 Unit(s) SubCutaneous three times a day before meals  lactulose Syrup 10 Gram(s) Oral three times a day  levothyroxine 50 MICROGram(s) Oral daily  pantoprazole    Tablet 40 milliGRAM(s) Oral every 12 hours  rifAXIMin 550 milliGRAM(s) Oral two times a day    MEDICATIONS  (PRN):  acetaminophen   Tablet .. 500 milliGRAM(s) Oral every 6 hours PRN Temp greater or equal to 38C (100.4F), Mild Pain (1 - 3)  dextrose 40% Gel 15 Gram(s) Oral once PRN Blood Glucose LESS THAN 70 milliGRAM(s)/deciliter  glucagon  Injectable 1 milliGRAM(s) IntraMuscular once PRN Glucose LESS THAN 70 milligrams/deciliter  traZODone 50 milliGRAM(s) Oral at bedtime PRN Sleep      ALLERGIES:  Allergies    No Known Allergies    Intolerances        LABS:                        6.9    15.52 )-----------( 321      ( 2020 07:25 )             21.7     04-23    136  |  100  |  36<H>  ----------------------------<  138<H>  4.1   |  22  |  5.82<H>    Ca    8.2<L>      2020 06:16  Phos  3.5     04-  Mg     1.7         TPro  6.5  /  Alb  3.1<L>  /  TBili  0.3  /  DBili  x   /  AST  15  /  ALT  6<L>  /  AlkPhos  94  04-23      Urinalysis Basic - ( 2020 13:14 )    Color: Yellow / Appearance: Clear / S.015 / pH: x  Gluc: x / Ketone: NEGATIVE  / Bili: Negative / Urobili: 0.2 E.U./dL   Blood: x / Protein: 30 mg/dL / Nitrite: NEGATIVE   Leuk Esterase: NEGATIVE / RBC: < 5 /HPF / WBC < 5 /HPF   Sq Epi: x / Non Sq Epi: 0-5 /HPF / Bacteria: Present /HPF      CAPILLARY BLOOD GLUCOSE      POCT Blood Glucose.: 185 mg/dL (2020 08:04)      RADIOLOGY & ADDITIONAL TESTS: Reviewed.

## 2020-04-24 NOTE — PROGRESS NOTE ADULT - ATTENDING COMMENTS
agree with above.   pt with improving PO intake.   increase lantus to 8 units lantus and 4 units lispro tid with meals  will follow

## 2020-04-24 NOTE — PROGRESS NOTE ADULT - ASSESSMENT
61 y/o M with PMH of HTN, DM complicated by neuropathy, EtOH liver cirrhosis, presenting w/ pneumatosis of unknown etiology, s/p sub total colectomy and end ileostomy on 3/31, c/b fever and purulent wound infection (4/9) now s/p paracentesis 4/13    Neuro: Pain/Nausea control  CV: HDS but hypertensive, lisinopril 40 mg and amlodipine 10mg, Labetalol 20 mg PRN BP >170, hydralazine 10 mg prn  Pulm: CED RAMIREZ: CLD  : Voids  ID: Zosyn (4/9-), vanc (4/9-4/13), azithro 4/13-4/16, plaquenil 4/13-4/16  Endo: ISS, lantus 6, synthroid  PPx: SCDs, SQH, OOBA  PT/OT: initial evaluation 4/3  Wounds: Vac in place, changes Tues/Thurs/Sat

## 2020-04-24 NOTE — PROGRESS NOTE ADULT - SUBJECTIVE AND OBJECTIVE BOX
INTERVAL HPI/OVERNIGHT EVENTS:    Patient is a 60y old  Male who presents with a chief complaint of Colectomy (2020 10:46)      Pt reports the following symptoms:    CONSTITUTIONAL:  Negative fever or chills, feels well, good appetite  EYES:  Negative  blurry vision or double vision  CARDIOVASCULAR:  Negative for chest pain or palpitations  RESPIRATORY:  Negative for cough, wheezing, or SOB   GASTROINTESTINAL:  Negative for nausea, vomiting, diarrhea, constipation, or abdominal pain  GENITOURINARY:  Negative frequency, urgency or dysuria  NEUROLOGIC:  No headache, confusion, dizziness, lightheadedness    MEDICATIONS  (STANDING):  amLODIPine   Tablet 10 milliGRAM(s) Oral daily  atorvastatin 10 milliGRAM(s) Oral at bedtime  chlorhexidine 2% Cloths 1 Application(s) Topical daily  dextrose 5%. 1000 milliLiter(s) (50 mL/Hr) IV Continuous <Continuous>  dextrose 50% Injectable 12.5 Gram(s) IV Push once  dextrose 50% Injectable 25 Gram(s) IV Push once  dextrose 50% Injectable 25 Gram(s) IV Push once  diphenoxylate/atropine 2 Tablet(s) Oral every 6 hours  heparin   Injectable 7500 Unit(s) SubCutaneous every 8 hours  insulin glargine Injectable (LANTUS) 7 Unit(s) SubCutaneous at bedtime  insulin lispro (HumaLOG) corrective regimen sliding scale   SubCutaneous Before meals and at bedtime  insulin lispro Injectable (HumaLOG) 3 Unit(s) SubCutaneous three times a day before meals  lactulose Syrup 10 Gram(s) Oral three times a day  levothyroxine 50 MICROGram(s) Oral daily  pantoprazole    Tablet 40 milliGRAM(s) Oral every 12 hours  rifAXIMin 550 milliGRAM(s) Oral two times a day    MEDICATIONS  (PRN):  acetaminophen   Tablet .. 500 milliGRAM(s) Oral every 6 hours PRN Temp greater or equal to 38C (100.4F), Mild Pain (1 - 3)  dextrose 40% Gel 15 Gram(s) Oral once PRN Blood Glucose LESS THAN 70 milliGRAM(s)/deciliter  glucagon  Injectable 1 milliGRAM(s) IntraMuscular once PRN Glucose LESS THAN 70 milligrams/deciliter  traZODone 50 milliGRAM(s) Oral at bedtime PRN Sleep      PHYSICAL EXAM  Vital Signs Last 24 Hrs  T(C): 36.7 (2020 11:00), Max: 37.7 (2020 21:12)  T(F): 98.1 (2020 11:00), Max: 99.9 (2020 21:12)  HR: 87 (2020 11:00) (80 - 96)  BP: 158/78 (2020 11:00) (117/66 - 166/73)  BP(mean): --  RR: 18 (2020 11:00) (18 - 20)  SpO2: 95% (2020 11:00) (94% - 96%)    Constitutional: wn/wd in NAD.   HEENT: NCAT, MMM, OP clear, EOMI, no proptosis or lid retraction  Neck: no thyromegaly or palpable thyroid nodules   Respiratory: lungs CTAB.  Cardiovascular: regular rhythm, normal S1 and S2, no audible murmurs, no peripheral edema  GI: soft, NT/ND, no masses/HSM appreciated.  Neurology: no tremors, DTR 2+  Skin: no visible rashes/lesions  Psychiatric: AAO x 3, normal affect/mood.    LABS:                        7.7    17.73 )-----------( 314      ( 2020 10:16 )             23.9     04-24    134<L>  |  99  |  34<H>  ----------------------------<  193<H>  4.1   |  19<L>  |  5.20<H>    Ca    8.4      2020 07:25  Phos  2.5     04-24  Mg     1.7     04-24    TPro  6.6  /  Alb  3.0<L>  /  TBili  0.3  /  DBili  x   /  AST  17  /  ALT  7<L>  /  AlkPhos  95  04-24      Urinalysis Basic - ( 2020 13:14 )    Color: Yellow / Appearance: Clear / S.015 / pH: x  Gluc: x / Ketone: NEGATIVE  / Bili: Negative / Urobili: 0.2 E.U./dL   Blood: x / Protein: 30 mg/dL / Nitrite: NEGATIVE   Leuk Esterase: NEGATIVE / RBC: < 5 /HPF / WBC < 5 /HPF   Sq Epi: x / Non Sq Epi: 0-5 /HPF / Bacteria: Present /HPF      Thyroid Stimulating Hormone, Serum: 2.813 uIU/mL ( @ 07:28)  Thyroid Stimulating Hormone, Serum: 2.877 uIU/mL ( @ 00:34)      HbA1C: 6.9 % ( @ 06:59)  7.6 % ( @ 05:58)    CAPILLARY BLOOD GLUCOSE      POCT Blood Glucose.: 228 mg/dL (2020 11:20)  POCT Blood Glucose.: 185 mg/dL (2020 08:04)  POCT Blood Glucose.: 229 mg/dL (2020 21:55)  POCT Blood Glucose.: 237 mg/dL (2020 17:20)      Insulin Sliding Scale requirements X 24 Hours:    RADIOLOGY & ADDITIONAL TESTS:    A/P: 60y Male with history of DM type II presenting for       1.  DM -     Please continue           units lantus at bedtime  / in the morning and        units lispro with meals and lispro moderate / low dose sliding scale 4 times daily with meals and at bedtime.  Please continue consistent carbohydrate diet.      Goal FSG is   Will continue to monitor   For discharge, pt can continue    Pt can follow up at discharge with [facility] by calling  to make an appointment.   Will discuss case with     and update primary team INTERVAL HPI/OVERNIGHT EVENTS:    Patient is a 60y old  Male who presents with a chief complaint of Colectomy (2020 10:46)  Spoke to the primary team taking care of the patient.  Monitoring renal function - no plans for any dialysis at this time as per nephro  was feeling little dizzy and nauseous today morning.  Hb droppe dto 6.9 - received 1 unit of PRBC - repeat was 7.7. refusing any GI work-up at this time  Saturating well on RA  FSg and insulin administration reviewed  appetite was fair    Pt reports the following symptoms:  CONSTITUTIONAL:  Negative chills  CARDIOVASCULAR:  Negative for chest pain or palpitations  RESPIRATORY:  Negative for cough, wheezing  GASTROINTESTINAL:  Negative for diarrhea, constipation  NEUROLOGIC:  No headache, confusion, dizziness, lightheadedness    MEDICATIONS  (STANDING):  amLODIPine   Tablet 10 milliGRAM(s) Oral daily  atorvastatin 10 milliGRAM(s) Oral at bedtime  chlorhexidine 2% Cloths 1 Application(s) Topical daily  dextrose 5%. 1000 milliLiter(s) (50 mL/Hr) IV Continuous <Continuous>  dextrose 50% Injectable 12.5 Gram(s) IV Push once  dextrose 50% Injectable 25 Gram(s) IV Push once  dextrose 50% Injectable 25 Gram(s) IV Push once  diphenoxylate/atropine 2 Tablet(s) Oral every 6 hours  heparin   Injectable 7500 Unit(s) SubCutaneous every 8 hours  insulin glargine Injectable (LANTUS) 7 Unit(s) SubCutaneous at bedtime  insulin lispro (HumaLOG) corrective regimen sliding scale   SubCutaneous Before meals and at bedtime  insulin lispro Injectable (HumaLOG) 3 Unit(s) SubCutaneous three times a day before meals  lactulose Syrup 10 Gram(s) Oral three times a day  levothyroxine 50 MICROGram(s) Oral daily  pantoprazole    Tablet 40 milliGRAM(s) Oral every 12 hours  rifAXIMin 550 milliGRAM(s) Oral two times a day    MEDICATIONS  (PRN):  acetaminophen   Tablet .. 500 milliGRAM(s) Oral every 6 hours PRN Temp greater or equal to 38C (100.4F), Mild Pain (1 - 3)  dextrose 40% Gel 15 Gram(s) Oral once PRN Blood Glucose LESS THAN 70 milliGRAM(s)/deciliter  glucagon  Injectable 1 milliGRAM(s) IntraMuscular once PRN Glucose LESS THAN 70 milligrams/deciliter  traZODone 50 milliGRAM(s) Oral at bedtime PRN Sleep      PHYSICAL EXAM  Vital Signs Last 24 Hrs  T(C): 36.7 (2020 11:00), Max: 37.7 (2020 21:12)  T(F): 98.1 (2020 11:00), Max: 99.9 (2020 21:12)  HR: 87 (2020 11:00) (80 - 96)  BP: 158/78 (2020 11:00) (117/66 - 166/73)  BP(mean): --  RR: 18 (2020 11:00) (18 - 20)  SpO2: 95% (2020 11:00) (94% - 96%)    Due to the nature of this patient’s COVID-19 isolation status (either confirmed or suspected), this note was prepared without a bedside physical examination to prevent spread of infection and to conserve personal protective equipment during this nationwide pandemic. If possible, direct patient communication occurred via electronic measures or telephone conversation. Examination highlights were provided by a bedside nurse wearing personal protective equipment and review of pertinent medical records. Objective data (vital signs, urine output, lab results, imaging studies, medications, etc) were reviewed in detail. Face to face visits and physical examination have been limited only to patients for whom it is required for medical decision making.    LABS:                        7.7    17.73 )-----------( 314      ( 2020 10:16 )             23.9     04-24    134<L>  |  99  |  34<H>  ----------------------------<  193<H>  4.1   |  19<L>  |  5.20<H>    Ca    8.4      2020 07:25  Phos  2.5     04-24  Mg     1.7     04-24    TPro  6.6  /  Alb  3.0<L>  /  TBili  0.3  /  DBili  x   /  AST  17  /  ALT  7<L>  /  AlkPhos  95  04-24      Urinalysis Basic - ( 2020 13:14 )    Color: Yellow / Appearance: Clear / S.015 / pH: x  Gluc: x / Ketone: NEGATIVE  / Bili: Negative / Urobili: 0.2 E.U./dL   Blood: x / Protein: 30 mg/dL / Nitrite: NEGATIVE   Leuk Esterase: NEGATIVE / RBC: < 5 /HPF / WBC < 5 /HPF   Sq Epi: x / Non Sq Epi: 0-5 /HPF / Bacteria: Present /HPF      Thyroid Stimulating Hormone, Serum: 2.813 uIU/mL ( @ 07:28)  Thyroid Stimulating Hormone, Serum: 2.877 uIU/mL ( @ 00:34)      HbA1C: 6.9 % ( @ 06:59)  7.6 % ( @ 05:58)    CAPILLARY BLOOD GLUCOSE      POCT Blood Glucose.: 228 mg/dL (2020 11:20)  POCT Blood Glucose.: 185 mg/dL (2020 08:04)  POCT Blood Glucose.: 229 mg/dL (2020 21:55)  POCT Blood Glucose.: 237 mg/dL (2020 17:20)      Insulin Sliding Scale requirements X 24 Hours:    RADIOLOGY & ADDITIONAL TESTS:    A/P: 60y Male with history of DM type II presenting for       1.  DM -     Please continue           units lantus at bedtime  / in the morning and        units lispro with meals and lispro moderate / low dose sliding scale 4 times daily with meals and at bedtime.  Please continue consistent carbohydrate diet.      Goal FSG is   Will continue to monitor   For discharge, pt can continue    Pt can follow up at discharge with [facility] by calling  to make an appointment.   Will discuss case with     and update primary team INTERVAL HPI/OVERNIGHT EVENTS:    Patient is a 60y old  Male who presents with a chief complaint of Colectomy (2020 10:46)  Spoke to the primary team taking care of the patient.  Monitoring renal function - no plans for any dialysis at this time as per nephro  was feeling little dizzy and nauseous today morning.  Hb droppe dto 6.9 - received 1 unit of PRBC - repeat was 7.7. refusing any GI work-up at this time  Saturating well on RA  FSg and insulin administration reviewed  appetite was fair    Pt reports the following symptoms:  CONSTITUTIONAL:  Negative chills  CARDIOVASCULAR:  Negative for chest pain or palpitations  RESPIRATORY:  Negative for cough, wheezing  GASTROINTESTINAL:  Negative for diarrhea, constipation  NEUROLOGIC:  No headache, confusion, dizziness, lightheadedness    MEDICATIONS  (STANDING):  amLODIPine   Tablet 10 milliGRAM(s) Oral daily  atorvastatin 10 milliGRAM(s) Oral at bedtime  chlorhexidine 2% Cloths 1 Application(s) Topical daily  dextrose 5%. 1000 milliLiter(s) (50 mL/Hr) IV Continuous <Continuous>  dextrose 50% Injectable 12.5 Gram(s) IV Push once  dextrose 50% Injectable 25 Gram(s) IV Push once  dextrose 50% Injectable 25 Gram(s) IV Push once  diphenoxylate/atropine 2 Tablet(s) Oral every 6 hours  heparin   Injectable 7500 Unit(s) SubCutaneous every 8 hours  insulin glargine Injectable (LANTUS) 7 Unit(s) SubCutaneous at bedtime  insulin lispro (HumaLOG) corrective regimen sliding scale   SubCutaneous Before meals and at bedtime  insulin lispro Injectable (HumaLOG) 3 Unit(s) SubCutaneous three times a day before meals  lactulose Syrup 10 Gram(s) Oral three times a day  levothyroxine 50 MICROGram(s) Oral daily  pantoprazole    Tablet 40 milliGRAM(s) Oral every 12 hours  rifAXIMin 550 milliGRAM(s) Oral two times a day    MEDICATIONS  (PRN):  acetaminophen   Tablet .. 500 milliGRAM(s) Oral every 6 hours PRN Temp greater or equal to 38C (100.4F), Mild Pain (1 - 3)  dextrose 40% Gel 15 Gram(s) Oral once PRN Blood Glucose LESS THAN 70 milliGRAM(s)/deciliter  glucagon  Injectable 1 milliGRAM(s) IntraMuscular once PRN Glucose LESS THAN 70 milligrams/deciliter  traZODone 50 milliGRAM(s) Oral at bedtime PRN Sleep      PHYSICAL EXAM  Vital Signs Last 24 Hrs  T(C): 36.7 (2020 11:00), Max: 37.7 (2020 21:12)  T(F): 98.1 (2020 11:00), Max: 99.9 (2020 21:12)  HR: 87 (2020 11:00) (80 - 96)  BP: 158/78 (2020 11:00) (117/66 - 166/73)  BP(mean): --  RR: 18 (2020 11:00) (18 - 20)  SpO2: 95% (2020 11:00) (94% - 96%)    Due to the nature of this patient’s COVID-19 isolation status (either confirmed or suspected), this note was prepared without a bedside physical examination to prevent spread of infection and to conserve personal protective equipment during this nationwide pandemic. If possible, direct patient communication occurred via electronic measures or telephone conversation. Examination highlights were provided by a bedside nurse wearing personal protective equipment and review of pertinent medical records. Objective data (vital signs, urine output, lab results, imaging studies, medications, etc) were reviewed in detail. Face to face visits and physical examination have been limited only to patients for whom it is required for medical decision making.    LABS:                        7.7    17.73 )-----------( 314      ( 2020 10:16 )             23.9     04-24    134<L>  |  99  |  34<H>  ----------------------------<  193<H>  4.1   |  19<L>  |  5.20<H>    Ca    8.4      2020 07:25  Phos  2.5     04-24  Mg     1.7     04-24    TPro  6.6  /  Alb  3.0<L>  /  TBili  0.3  /  DBili  x   /  AST  17  /  ALT  7<L>  /  AlkPhos  95  04-24      Urinalysis Basic - ( 2020 13:14 )    Color: Yellow / Appearance: Clear / S.015 / pH: x  Gluc: x / Ketone: NEGATIVE  / Bili: Negative / Urobili: 0.2 E.U./dL   Blood: x / Protein: 30 mg/dL / Nitrite: NEGATIVE   Leuk Esterase: NEGATIVE / RBC: < 5 /HPF / WBC < 5 /HPF   Sq Epi: x / Non Sq Epi: 0-5 /HPF / Bacteria: Present /HPF      Thyroid Stimulating Hormone, Serum: 2.813 uIU/mL ( @ 07:28)  Thyroid Stimulating Hormone, Serum: 2.877 uIU/mL ( @ 00:34)      HbA1C: 6.9 % ( @ 06:59)  7.6 % ( @ 05:58)    CAPILLARY BLOOD GLUCOSE      POCT Blood Glucose.: 228 mg/dL (2020 11:20)  POCT Blood Glucose.: 185 mg/dL (2020 08:04)  POCT Blood Glucose.: 229 mg/dL (2020 21:55)  POCT Blood Glucose.: 237 mg/dL (2020 17:20)      Insulin Sliding Scale requirements X 24 Hours:    RADIOLOGY & ADDITIONAL TESTS:    A/P 60yMale with hx of DM now with COVID, worsening renal function, moderate hyperglycemia.     1.  DM: type 2,   Please increase to Lantus 8 units at bedtime  PLease increase to Lispro 4 units tid with meals.   Continue lispro moderate dose scale with meals and at bedtime.   Continue consistent carb diet  FSG Goal 100-180    2.  Hyperlipidemia - goal LDL < 70  - on lipitor 10 mg once daily    3.  Obesity - outpatient medical management.      4.  Hypothyroidism - continue levothyroxine 50mcg once daily    Patient can followup with  after discharge  Case seen and discussed with  and updated the primary team.

## 2020-04-24 NOTE — PROGRESS NOTE ADULT - ATTENDING COMMENTS
Patient seen and examined. Discussed case with housestaff. Agree with above assessment and plan with addendum:    Patient doing well today. Respiratory status remains the same. Hgb 6.9 without obvious source of bleeding. Currently receiving 1UPRBC, will check post transfusion CBC. Patient with no insurance which will make dispo difficult. Surgery ok with switching wound vac to wet to dry dressing. PT to work with patient. If stable from PT standpoint, we can discharge to shelter with close outpatient follow up. Dispo currently pending CBC and PT follow up.

## 2020-04-25 LAB
ALBUMIN SERPL ELPH-MCNC: 3.3 G/DL — SIGNIFICANT CHANGE UP (ref 3.3–5)
ALP SERPL-CCNC: 103 U/L — SIGNIFICANT CHANGE UP (ref 40–120)
ALT FLD-CCNC: 8 U/L — LOW (ref 10–45)
ANION GAP SERPL CALC-SCNC: 12 MMOL/L — SIGNIFICANT CHANGE UP (ref 5–17)
AST SERPL-CCNC: 19 U/L — SIGNIFICANT CHANGE UP (ref 10–40)
BASOPHILS # BLD AUTO: 0.03 K/UL — SIGNIFICANT CHANGE UP (ref 0–0.2)
BASOPHILS NFR BLD AUTO: 0.2 % — SIGNIFICANT CHANGE UP (ref 0–2)
BILIRUB SERPL-MCNC: 0.4 MG/DL — SIGNIFICANT CHANGE UP (ref 0.2–1.2)
BLD GP AB SCN SERPL QL: NEGATIVE — SIGNIFICANT CHANGE UP
BUN SERPL-MCNC: 33 MG/DL — HIGH (ref 7–23)
CALCIUM SERPL-MCNC: 8.7 MG/DL — SIGNIFICANT CHANGE UP (ref 8.4–10.5)
CHLORIDE SERPL-SCNC: 100 MMOL/L — SIGNIFICANT CHANGE UP (ref 96–108)
CO2 SERPL-SCNC: 22 MMOL/L — SIGNIFICANT CHANGE UP (ref 22–31)
CREAT SERPL-MCNC: 4.51 MG/DL — HIGH (ref 0.5–1.3)
CRP SERPL-MCNC: 17.23 MG/DL — HIGH (ref 0–0.4)
CULTURE RESULTS: NO GROWTH — SIGNIFICANT CHANGE UP
D DIMER BLD IA.RAPID-MCNC: 2989 NG/ML DDU — HIGH
EOSINOPHIL # BLD AUTO: 0.28 K/UL — SIGNIFICANT CHANGE UP (ref 0–0.5)
EOSINOPHIL NFR BLD AUTO: 1.7 % — SIGNIFICANT CHANGE UP (ref 0–6)
FERRITIN SERPL-MCNC: 906 NG/ML — HIGH (ref 30–400)
GLUCOSE BLDC GLUCOMTR-MCNC: 110 MG/DL — HIGH (ref 70–99)
GLUCOSE BLDC GLUCOMTR-MCNC: 118 MG/DL — HIGH (ref 70–99)
GLUCOSE BLDC GLUCOMTR-MCNC: 142 MG/DL — HIGH (ref 70–99)
GLUCOSE BLDC GLUCOMTR-MCNC: 230 MG/DL — HIGH (ref 70–99)
GLUCOSE SERPL-MCNC: 143 MG/DL — HIGH (ref 70–99)
HCT VFR BLD CALC: 25.6 % — LOW (ref 39–50)
HGB BLD-MCNC: 8.3 G/DL — LOW (ref 13–17)
IMM GRANULOCYTES NFR BLD AUTO: 1.6 % — HIGH (ref 0–1.5)
LYMPHOCYTES # BLD AUTO: 0.77 K/UL — LOW (ref 1–3.3)
LYMPHOCYTES # BLD AUTO: 4.7 % — LOW (ref 13–44)
MAGNESIUM SERPL-MCNC: 1.8 MG/DL — SIGNIFICANT CHANGE UP (ref 1.6–2.6)
MCHC RBC-ENTMCNC: 29.2 PG — SIGNIFICANT CHANGE UP (ref 27–34)
MCHC RBC-ENTMCNC: 32.4 GM/DL — SIGNIFICANT CHANGE UP (ref 32–36)
MCV RBC AUTO: 90.1 FL — SIGNIFICANT CHANGE UP (ref 80–100)
MONOCYTES # BLD AUTO: 1.1 K/UL — HIGH (ref 0–0.9)
MONOCYTES NFR BLD AUTO: 6.7 % — SIGNIFICANT CHANGE UP (ref 2–14)
NEUTROPHILS # BLD AUTO: 14.09 K/UL — HIGH (ref 1.8–7.4)
NEUTROPHILS NFR BLD AUTO: 85.1 % — HIGH (ref 43–77)
NRBC # BLD: 0 /100 WBCS — SIGNIFICANT CHANGE UP (ref 0–0)
PHOSPHATE SERPL-MCNC: 2.3 MG/DL — LOW (ref 2.5–4.5)
PLATELET # BLD AUTO: 314 K/UL — SIGNIFICANT CHANGE UP (ref 150–400)
POTASSIUM SERPL-MCNC: 4.6 MMOL/L — SIGNIFICANT CHANGE UP (ref 3.5–5.3)
POTASSIUM SERPL-SCNC: 4.6 MMOL/L — SIGNIFICANT CHANGE UP (ref 3.5–5.3)
PROT SERPL-MCNC: 7.2 G/DL — SIGNIFICANT CHANGE UP (ref 6–8.3)
RBC # BLD: 2.84 M/UL — LOW (ref 4.2–5.8)
RBC # FLD: 14.1 % — SIGNIFICANT CHANGE UP (ref 10.3–14.5)
RH IG SCN BLD-IMP: POSITIVE — SIGNIFICANT CHANGE UP
SODIUM SERPL-SCNC: 134 MMOL/L — LOW (ref 135–145)
SPECIMEN SOURCE: SIGNIFICANT CHANGE UP
WBC # BLD: 16.54 K/UL — HIGH (ref 3.8–10.5)
WBC # FLD AUTO: 16.54 K/UL — HIGH (ref 3.8–10.5)

## 2020-04-25 PROCEDURE — 99233 SBSQ HOSP IP/OBS HIGH 50: CPT | Mod: GC

## 2020-04-25 PROCEDURE — 99232 SBSQ HOSP IP/OBS MODERATE 35: CPT

## 2020-04-25 RX ORDER — DIPHENOXYLATE HCL/ATROPINE 2.5-.025MG
1 TABLET ORAL EVERY 12 HOURS
Refills: 0 | Status: DISCONTINUED | OUTPATIENT
Start: 2020-04-25 | End: 2020-04-27

## 2020-04-25 RX ORDER — SODIUM CHLORIDE 9 MG/ML
500 INJECTION INTRAMUSCULAR; INTRAVENOUS; SUBCUTANEOUS ONCE
Refills: 0 | Status: COMPLETED | OUTPATIENT
Start: 2020-04-25 | End: 2020-04-25

## 2020-04-25 RX ADMIN — Medication 50 MILLIGRAM(S): at 22:35

## 2020-04-25 RX ADMIN — Medication 50 MICROGRAM(S): at 05:50

## 2020-04-25 RX ADMIN — Medication 1 TABLET(S): at 22:30

## 2020-04-25 RX ADMIN — Medication 500 MILLIGRAM(S): at 22:35

## 2020-04-25 RX ADMIN — CHLORHEXIDINE GLUCONATE 1 APPLICATION(S): 213 SOLUTION TOPICAL at 11:43

## 2020-04-25 RX ADMIN — ATORVASTATIN CALCIUM 10 MILLIGRAM(S): 80 TABLET, FILM COATED ORAL at 22:30

## 2020-04-25 RX ADMIN — HEPARIN SODIUM 7500 UNIT(S): 5000 INJECTION INTRAVENOUS; SUBCUTANEOUS at 14:02

## 2020-04-25 RX ADMIN — Medication 4 UNIT(S): at 08:37

## 2020-04-25 RX ADMIN — HEPARIN SODIUM 7500 UNIT(S): 5000 INJECTION INTRAVENOUS; SUBCUTANEOUS at 05:49

## 2020-04-25 RX ADMIN — INSULIN GLARGINE 8 UNIT(S): 100 INJECTION, SOLUTION SUBCUTANEOUS at 22:22

## 2020-04-25 RX ADMIN — Medication 4: at 08:37

## 2020-04-25 RX ADMIN — HEPARIN SODIUM 7500 UNIT(S): 5000 INJECTION INTRAVENOUS; SUBCUTANEOUS at 22:30

## 2020-04-25 RX ADMIN — Medication 650 MILLIGRAM(S): at 05:50

## 2020-04-25 RX ADMIN — Medication 4 UNIT(S): at 17:48

## 2020-04-25 RX ADMIN — AMLODIPINE BESYLATE 10 MILLIGRAM(S): 2.5 TABLET ORAL at 05:49

## 2020-04-25 RX ADMIN — Medication 650 MILLIGRAM(S): at 14:02

## 2020-04-25 RX ADMIN — Medication 4 UNIT(S): at 12:09

## 2020-04-25 RX ADMIN — Medication 500 MILLIGRAM(S): at 15:02

## 2020-04-25 RX ADMIN — SODIUM CHLORIDE 250 MILLILITER(S): 9 INJECTION INTRAMUSCULAR; INTRAVENOUS; SUBCUTANEOUS at 11:42

## 2020-04-25 RX ADMIN — PANTOPRAZOLE SODIUM 40 MILLIGRAM(S): 20 TABLET, DELAYED RELEASE ORAL at 05:50

## 2020-04-25 RX ADMIN — Medication 1 TABLET(S): at 10:53

## 2020-04-25 RX ADMIN — Medication 650 MILLIGRAM(S): at 22:30

## 2020-04-25 RX ADMIN — PANTOPRAZOLE SODIUM 40 MILLIGRAM(S): 20 TABLET, DELAYED RELEASE ORAL at 17:48

## 2020-04-25 NOTE — PROGRESS NOTE ADULT - PROBLEM SELECTOR PLAN 9
F: none  E: replete as needed   N: clear liquid consistent carb ; will advance today  GI ppx: protonix BID   DVT ppx: heparin 7500U q12h; DVT neg by ultrasound F: none  E: cautious given renal failure  N: low fiber diet  GI ppx: protonix BID   DVT ppx: heparin 7500U q12h; DVT neg by ultrasound

## 2020-04-25 NOTE — PROGRESS NOTE ADULT - SUBJECTIVE AND OBJECTIVE BOX
S: c/o pain in abd, legs and back (not new), walking, eating w/ apetite, no SOB    VITAL SIGNS:  Vital Signs Last 24 Hrs  T(C): 37.3 (25 Apr 2020 06:13), Max: 37.3 (25 Apr 2020 06:13)  T(F): 99.1 (25 Apr 2020 06:13), Max: 99.1 (25 Apr 2020 06:13)  HR: 73 (25 Apr 2020 06:13) (73 - 87)  BP: 153/65 (25 Apr 2020 06:13) (153/65 - 162/69)  BP(mean): --  RR: 19 (25 Apr 2020 06:13) (18 - 189)  SpO2: 96% (25 Apr 2020 06:13) (95% - 97%)          PHYSICAL EXAM:  General: middle aged male in no acute distress, grouchy demeanor, breathing comfortably on RA  HEENT: NC/AT; PERRL, anicteric sclera; MMM  Respiratory: no increased work of breathing with no accessory muscle use; on RA  Gastrointestinal: distended, mildly firm, mildly tender diffusely; with dressing over wound and ostomy bag in place draining brown stool   Extremities: WWP; no edema, clubbing or cyanosis  Neurological: AAOx3; no focal deficits    MEDICATIONS:  MEDICATIONS  (STANDING):  amLODIPine   Tablet 10 milliGRAM(s) Oral daily  atorvastatin 10 milliGRAM(s) Oral at bedtime  chlorhexidine 2% Cloths 1 Application(s) Topical daily  dextrose 5%. 1000 milliLiter(s) (50 mL/Hr) IV Continuous <Continuous>  dextrose 50% Injectable 12.5 Gram(s) IV Push once  dextrose 50% Injectable 25 Gram(s) IV Push once  dextrose 50% Injectable 25 Gram(s) IV Push once  diphenoxylate/atropine 1 Tablet(s) Oral every 12 hours  heparin   Injectable 7500 Unit(s) SubCutaneous every 8 hours  insulin glargine Injectable (LANTUS) 8 Unit(s) SubCutaneous at bedtime  insulin lispro (HumaLOG) corrective regimen sliding scale   SubCutaneous Before meals and at bedtime  insulin lispro Injectable (HumaLOG) 4 Unit(s) SubCutaneous three times a day before meals  levothyroxine 50 MICROGram(s) Oral daily  pantoprazole    Tablet 40 milliGRAM(s) Oral every 12 hours  rifAXIMin 550 milliGRAM(s) Oral two times a day  sodium bicarbonate 650 milliGRAM(s) Oral three times a day    MEDICATIONS  (PRN):  acetaminophen   Tablet .. 500 milliGRAM(s) Oral every 6 hours PRN Temp greater or equal to 38C (100.4F), Mild Pain (1 - 3)  dextrose 40% Gel 15 Gram(s) Oral once PRN Blood Glucose LESS THAN 70 milliGRAM(s)/deciliter  glucagon  Injectable 1 milliGRAM(s) IntraMuscular once PRN Glucose LESS THAN 70 milligrams/deciliter  traZODone 50 milliGRAM(s) Oral at bedtime PRN Sleep      ALLERGIES:  Allergies    No Known Allergies    Intolerances        LABS:                        8.3    16.54 )-----------( 314      ( 25 Apr 2020 06:24 )             25.6     04-25    134<L>  |  100  |  33<H>  ----------------------------<  143<H>  4.6   |  22  |  4.51<H>    Ca    8.7      25 Apr 2020 06:24  Phos  2.3     04-25  Mg     1.8     04-25    TPro  7.2  /  Alb  3.3  /  TBili  0.4  /  DBili  x   /  AST  19  /  ALT  8<L>  /  AlkPhos  103  04-25      Fingerstick  glucose: POCT Blood Glucose.: 230 mg/dL (25 Apr 2020 08:12)      RADIOLOGY & ADDITIONAL TESTS: Reviewed.

## 2020-04-25 NOTE — PROGRESS NOTE ADULT - SUBJECTIVE AND OBJECTIVE BOX
Patient is a 60y Male seen and evaluated at bedside.   pt seen and discussed with team   labs noted: BUn/cr 33/4.51    acetaminophen   Tablet .. 500 every 6 hours PRN  amLODIPine   Tablet 10 daily  atorvastatin 10 at bedtime  chlorhexidine 2% Cloths 1 daily  dextrose 40% Gel 15 once PRN  dextrose 5%. 1000 <Continuous>  dextrose 50% Injectable 12.5 once  dextrose 50% Injectable 25 once  dextrose 50% Injectable 25 once  diphenoxylate/atropine 1 every 12 hours  glucagon  Injectable 1 once PRN  heparin   Injectable 7500 every 8 hours  insulin glargine Injectable (LANTUS) 8 at bedtime  insulin lispro (HumaLOG) corrective regimen sliding scale  Before meals and at bedtime  insulin lispro Injectable (HumaLOG) 4 three times a day before meals  levothyroxine 50 daily  pantoprazole    Tablet 40 every 12 hours  rifAXIMin 550 two times a day  sodium bicarbonate 650 three times a day  traZODone 50 at bedtime PRN      Allergies    No Known Allergies    Intolerances        T(C): , Max: 37.3 (04-25-20 @ 06:13)  T(F): , Max: 99.1 (04-25-20 @ 06:13)  HR: 73 (04-25-20 @ 06:13)  BP: 153/65 (04-25-20 @ 06:13)  BP(mean): --  RR: 19 (04-25-20 @ 06:13)  SpO2: 96% (04-25-20 @ 06:13)  Wt(kg): --    04-24 @ 07:01  -  04-25 @ 07:00  --------------------------------------------------------  IN: 350 mL / OUT: 2250 mL / NET: -1900 mL          Review of Systems:  deferred due to COVID 19      PHYSICAL EXAM:  deferred due to COVID 19              LABS:                        8.3    16.54 )-----------( 314      ( 25 Apr 2020 06:24 )             25.6     04-25    134<L>  |  100  |  33<H>  ----------------------------<  143<H>  4.6   |  22  |  4.51<H>    Ca    8.7      25 Apr 2020 06:24  Phos  2.3     04-25  Mg     1.8     04-25    TPro  7.2  /  Alb  3.3  /  TBili  0.4  /  DBili  x   /  AST  19  /  ALT  8<L>  /  AlkPhos  103  04-25                RADIOLOGY & ADDITIONAL STUDIES:

## 2020-04-25 NOTE — PROGRESS NOTE ADULT - PROBLEM SELECTOR PLAN 5
Normocytic anemia. Likely 2/2 anemia of chronic disease and kidney dysfunction vs acute blood loss. Hgb 6.9 4/24, ileostomy output with brown stool   - will transfuse 1U pRBC 4/24; recheck cbc later  - s/p 4U pRBCs total this admission as of 4/24  - continue po pantoprazole 40mg BID   - monitor for signs of bleeding   - GI following and planned for video capsule endoscopy this admission, but pt refused Normocytic anemia. Likely 2/2 anemia of chronic disease and kidney dysfunction vs acute blood loss. Hgb 6.9 4/24, ileostomy output with brown stool   -monitor hb, transfuse <7  - s/p 4U pRBCs total this admission as of 4/24  - continue po pantoprazole 40mg BID   - monitor for signs of bleeding   - GI following and planned for video capsule endoscopy this admission, but pt refused

## 2020-04-25 NOTE — PROGRESS NOTE ADULT - ASSESSMENT
61 y/o M with PMH of HTN, DM complicated by neuropathy, IVDU, ETOH liver cirrhosis, who presents to [facility] for fall at home; found to have toxic megacolon and pneumatosis; underwent ex lap and subtotal colectomy, and ileostomy on 3/31. Developed COVID 19 while in the hospital, but subsequent test was positive, patient was transferred to tele COVID unit without need for supplemental oxygen. Now on RA, on wet to dry dressing, walked with PT, MEG improving, stable for dispo to custodial when set up with [facility].

## 2020-04-25 NOTE — PROGRESS NOTE ADULT - PROBLEM SELECTOR PLAN 2
Decompensated cirrhosis likely 2/2 alcohol use, c/b ascites, hepatic encephalopathy on admission (resolved)  - CT abd/pelvis with persistent large ascites and peritonitis; bilateral pleural effusions and pericardial effusion  - MELD-Na score 28  - s/p paracentesis on 4/10 with negative cytology but not sent for cell count/culture, however was on zosyn for 2 weeks so GI with no concerns for SBP   - repeat paracentesis 4/19, but unable to aspirate enough fluid?   - SAAG 1.4 consistent with portal hypertension as etiology of ascites  - continue rifaximin 550mg BID (changed from lactulose) for hepatic encephalopathy   - holding lactulose for high ostomy output  - unclear if pt with varices- pt refuses gi workup- monitoring hb and active T+S transfuse if neede   - pt refusing video capsule endoscopy this admission; explained risks of not undergoing procedure and pt understands and repeats back risks of refusing procedure.   - pt needs screening for HCC outpatient and follow up with hepatology outpatient    #r/o SBP   SAAG 1.4 consistent with portal hypertension as etiology of SBP   - ID following, appreciate recs   - ID recommending observe off abx (previously on meropenem 4/20-4/21)   - if GI concerned for active variceal bleed (not currently at this time), ID recommends restarting IV CTX 1g q24h ppx Decompensated cirrhosis likely 2/2 alcohol use, c/b ascites, hepatic encephalopathy on admission (resolved) sbp ruled out  - CT abd/pelvis with persistent large ascites and peritonitis; bilateral pleural effusions and pericardial effusion  - MELD-Na score 28  - s/p paracentesis on 4/10 with negative cytology but not sent for cell count/culture, however was on zosyn for 2 weeks so GI with no concerns for SBP   - repeat paracentesis 4/19, but unable to aspirate enough fluid?   - SAAG 1.4 consistent with portal hypertension as etiology of ascites  - continue rifaximin 550mg BID (changed from lactulose) for hepatic encephalopathy   - holding lactulose for high ostomy output  - unclear if pt with varices- pt refuses gi workup- monitoring hb and active T+S transfuse if neede   - pt refusing video capsule endoscopy this admission; explained risks of not undergoing procedure and pt understands and repeats back risks of refusing procedure.   - pt needs screening for HCC outpatient and follow up with hepatology outpatient

## 2020-04-25 NOTE — PROGRESS NOTE ADULT - SUBJECTIVE AND OBJECTIVE BOX
INTERVAL HPI/OVERNIGHT EVENTS: NAEO. This AM pt has no complaints other than mild nausea. Denies v/cp/sob.       MEDICATIONS  (STANDING):  amLODIPine   Tablet 10 milliGRAM(s) Oral daily  atorvastatin 10 milliGRAM(s) Oral at bedtime  chlorhexidine 2% Cloths 1 Application(s) Topical daily  dextrose 5%. 1000 milliLiter(s) (50 mL/Hr) IV Continuous <Continuous>  dextrose 50% Injectable 12.5 Gram(s) IV Push once  dextrose 50% Injectable 25 Gram(s) IV Push once  dextrose 50% Injectable 25 Gram(s) IV Push once  heparin   Injectable 7500 Unit(s) SubCutaneous every 8 hours  insulin glargine Injectable (LANTUS) 8 Unit(s) SubCutaneous at bedtime  insulin lispro (HumaLOG) corrective regimen sliding scale   SubCutaneous Before meals and at bedtime  insulin lispro Injectable (HumaLOG) 4 Unit(s) SubCutaneous three times a day before meals  levothyroxine 50 MICROGram(s) Oral daily  pantoprazole    Tablet 40 milliGRAM(s) Oral every 12 hours  rifAXIMin 550 milliGRAM(s) Oral two times a day  sodium bicarbonate 650 milliGRAM(s) Oral three times a day    MEDICATIONS  (PRN):  acetaminophen   Tablet .. 500 milliGRAM(s) Oral every 6 hours PRN Temp greater or equal to 38C (100.4F), Mild Pain (1 - 3)  dextrose 40% Gel 15 Gram(s) Oral once PRN Blood Glucose LESS THAN 70 milliGRAM(s)/deciliter  glucagon  Injectable 1 milliGRAM(s) IntraMuscular once PRN Glucose LESS THAN 70 milligrams/deciliter  traZODone 50 milliGRAM(s) Oral at bedtime PRN Sleep      Vital Signs Last 24 Hrs  T(C): 37.3 (25 Apr 2020 06:13), Max: 37.5 (24 Apr 2020 08:48)  T(F): 99.1 (25 Apr 2020 06:13), Max: 99.5 (24 Apr 2020 08:48)  HR: 73 (25 Apr 2020 06:13) (73 - 96)  BP: 153/65 (25 Apr 2020 06:13) (144/71 - 162/69)  BP(mean): --  RR: 19 (25 Apr 2020 06:13) (18 - 189)  SpO2: 96% (25 Apr 2020 06:13) (95% - 97%)    PHYSICAL EXAM:      Constitutional: A&Ox3    Respiratory: non labored breathing, no respiratory distress    Cardiovascular: NSR, RRR    Gastrointestinal: abdomen is soft, non-distended, non-tender                 Incision: midline wound is dressing taken down at bedside. Good granulation tissue, no sign of purulence, or surrounding erythema. WTD replaced.     Extremities: (-) edema                  I&O's Detail    24 Apr 2020 07:01  -  25 Apr 2020 07:00  --------------------------------------------------------  IN:    Oral Fluid: 350 mL  Total IN: 350 mL    OUT:    Ileostomy: 1800 mL    Voided: 450 mL  Total OUT: 2250 mL    Total NET: -1900 mL          LABS:                        8.3    16.54 )-----------( 314      ( 25 Apr 2020 06:24 )             25.6     04-25    134<L>  |  100  |  33<H>  ----------------------------<  143<H>  4.6   |  22  |  4.51<H>    Ca    8.7      25 Apr 2020 06:24  Phos  2.3     04-25  Mg     1.8     04-25    TPro  7.2  /  Alb  3.3  /  TBili  0.4  /  DBili  x   /  AST  19  /  ALT  8<L>  /  AlkPhos  103  04-25          RADIOLOGY & ADDITIONAL STUDIES:

## 2020-04-25 NOTE — PROGRESS NOTE ADULT - ATTENDING COMMENTS
covid infection  MEG probable ATN with component of prenal and urine retention.  now voiding better per nurses  BUN/creat downtrending  still with copious outputs from ostomy  Na now low at 134- hyponatremic  can change D5W to .9NS

## 2020-04-25 NOTE — PROGRESS NOTE ADULT - PROBLEM SELECTOR PLAN 7
- continue home dose of amlodipine at 10mg daily (10mg starting 4/24)  - restart home pravastatin 40mg daily; therapeutic interchange with atorvastatin 10mg - continue home dose of amlodipine at 10mg daily (10mg starting 4/24)  - restart home pravastatin 40mg daily; therapeutic interchange with atorvastatin 10mg  -holding home hctz and lisinopril given MEG/ ARF

## 2020-04-25 NOTE — PROGRESS NOTE ADULT - PROBLEM SELECTOR PLAN 4
Nonoliguric MEG 2/2 likey ATN, per nephrology. Per renal, likely combination of pre-renal, intrinsic cause 2/2 COVID-19 and contrast exposure on 4/10 vs vancomycin toxicity.   - previously on albumin, octreotide, and midodrine for albumin trial for possible hepatorenal syndrome, but without improvement in kidney function  - FeNa 2.0% and FeUrea 63% intrinsic disease  - Cr downtrending, renal says no indication of HD; follow up outpatient  - net even fluid balance   - renal recommending gentle IV hydration if high GI output  - avoid ACE/ARB/NSAIDs/metformin/contrast   - strict I/Os   - appreciate renal recs    #hyponatremia   RESOLVED     #metabolic acidosis   RESOLVED  Previously on sodium bicarbonate tabs TID Nonoliguric MEG 2/2 likey ATN, per nephrology. Per renal, likely combination of pre-renal, intrinsic cause 2/2 COVID-19 and contrast exposure on 4/10 vs vancomycin toxicity. IMPROVING  - previously on albumin, octreotide, and midodrine for albumin trial for possible hepatorenal syndrome, but without improvement in kidney function  - FeNa 2.0% and FeUrea 63% intrinsic disease  - Cr downtrending, renal says no indication of HD; follow up outpatient  - net even fluid balance   - renal recommending gentle IV hydration if high GI output  - avoid ACE/ARB/NSAIDs/metformin/contrast   - strict I/Os   - appreciate renal recs    #hyponatremia   RESOLVED     #metabolic acidosis   - sodium bicarbonate tabs TID per renal

## 2020-04-25 NOTE — PROGRESS NOTE ADULT - ATTENDING COMMENTS
Patient seen and examined. Discussed case with housestaff. Agree with above assessment and plan with addendum:    Pt complaining of some abdominal discomfort. Keeps complaining of no BM but reminded it comes out of ostomy. Pt with high output from ostomy. Discussed with Surgery and recommend lomotil for output and to replete output with IVF. Will give NS 500cc bolus over 2 hours. Will continue to monitor for pain. If worsening, will get further abdominal imaging.     Patient pending complex case discharge to shelter.

## 2020-04-25 NOTE — PROGRESS NOTE ADULT - PROBLEM SELECTOR PLAN 6
Type 2 DM, endocrinology following   - continue lantus 7U qhs   - continue lispro 2U TID premeals  - moderate SSI   - FSG goal 140-180    #hypothyroidism   - TSH 2.813; TPO and antithyroid Abs neg   - continue levothyroxine 50mcg   - appreciate endocrinology recs Type 2 DM, endocrinology following   - continue lantus 7U qhs   - continue lispro 3U TID premeals  - moderate SSI   - FSG goal 140-180    #hypothyroidism   - TSH 2.813; TPO and antithyroid Abs neg   - continue levothyroxine 50mcg   - appreciate endocrinology recs

## 2020-04-25 NOTE — PROGRESS NOTE ADULT - ASSESSMENT
61 y/o M with PMH of HTN, DM complicated by neuropathy, IVDU, ETOH liver cirrhosis, who presents to [facility] for fall at home. At [facility] patient reported that he takes lactulose at home daily and has daily BM, but he had no BM for 5 days prior to admission. 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 Patient underwent ex lap and subtotal colectomy, and ileostomy on 3/31.  COVID +     MEG (acute kidney injury)   likely ATN  non oliguric  electrolytes noted  cr trending down   ileostomy output noted- encourage po intake  continue with po sodium bicarbonate 650 mg tid  - avoid ACE/ARB/NSAIDS/Metformin/Contrast  - strict i/o    # Hyponatremia   mild  encourage po intake    HTN  on amlodipine 10 mg po daily

## 2020-04-25 NOTE — PROGRESS NOTE ADULT - ASSESSMENT
61 y/o M with PMH of HTN, DM complicated by neuropathy, EtOH liver cirrhosis, presenting w/ pneumatosis of unknown etiology, s/p sub total colectomy and end ileostomy on 3/31, c/b fever and purulent wound infection (4/9) now s/p paracentesis 4/13    Neuro: Pain/Nausea control  CV: HDS but hypertensive, lisinopril 40 mg and amlodipine 10mg, Labetalol 20 mg PRN BP >170, hydralazine 10 mg prn  Pulm: CED RAMIREZ: Low fiber diet  : Voids  ID: Meropenam (4/19-4/20) Zosyn (4/9-4/14), vanc (4/9-4/13), azithro 4/13-4/16, plaquenil 4/13-4/16  Endo: ISS, lantus 8, synthroid  PPx: SCDs, SQH, OOBA  PT/OT: initial evaluation 4/3  Wounds: WTD dressing for midline wound

## 2020-04-26 LAB
ALBUMIN SERPL ELPH-MCNC: 2.9 G/DL — LOW (ref 3.3–5)
ALP SERPL-CCNC: 99 U/L — SIGNIFICANT CHANGE UP (ref 40–120)
ALT FLD-CCNC: 8 U/L — LOW (ref 10–45)
ANION GAP SERPL CALC-SCNC: 13 MMOL/L — SIGNIFICANT CHANGE UP (ref 5–17)
AST SERPL-CCNC: 22 U/L — SIGNIFICANT CHANGE UP (ref 10–40)
BASOPHILS # BLD AUTO: 0.03 K/UL — SIGNIFICANT CHANGE UP (ref 0–0.2)
BASOPHILS NFR BLD AUTO: 0.2 % — SIGNIFICANT CHANGE UP (ref 0–2)
BILIRUB SERPL-MCNC: 0.3 MG/DL — SIGNIFICANT CHANGE UP (ref 0.2–1.2)
BUN SERPL-MCNC: 31 MG/DL — HIGH (ref 7–23)
CALCIUM SERPL-MCNC: 8.4 MG/DL — SIGNIFICANT CHANGE UP (ref 8.4–10.5)
CHLORIDE SERPL-SCNC: 101 MMOL/L — SIGNIFICANT CHANGE UP (ref 96–108)
CO2 SERPL-SCNC: 20 MMOL/L — LOW (ref 22–31)
CREAT SERPL-MCNC: 4.07 MG/DL — HIGH (ref 0.5–1.3)
CRP SERPL-MCNC: 12.65 MG/DL — HIGH (ref 0–0.4)
CULTURE RESULTS: NO GROWTH — SIGNIFICANT CHANGE UP
D DIMER BLD IA.RAPID-MCNC: 2275 NG/ML DDU — HIGH
EOSINOPHIL # BLD AUTO: 0.28 K/UL — SIGNIFICANT CHANGE UP (ref 0–0.5)
EOSINOPHIL NFR BLD AUTO: 2.2 % — SIGNIFICANT CHANGE UP (ref 0–6)
GLUCOSE BLDC GLUCOMTR-MCNC: 102 MG/DL — HIGH (ref 70–99)
GLUCOSE BLDC GLUCOMTR-MCNC: 108 MG/DL — HIGH (ref 70–99)
GLUCOSE BLDC GLUCOMTR-MCNC: 114 MG/DL — HIGH (ref 70–99)
GLUCOSE BLDC GLUCOMTR-MCNC: 134 MG/DL — HIGH (ref 70–99)
GLUCOSE SERPL-MCNC: 109 MG/DL — HIGH (ref 70–99)
HCT VFR BLD CALC: 24.3 % — LOW (ref 39–50)
HGB BLD-MCNC: 7.6 G/DL — LOW (ref 13–17)
IMM GRANULOCYTES NFR BLD AUTO: 1.6 % — HIGH (ref 0–1.5)
LYMPHOCYTES # BLD AUTO: 0.75 K/UL — LOW (ref 1–3.3)
LYMPHOCYTES # BLD AUTO: 5.9 % — LOW (ref 13–44)
MAGNESIUM SERPL-MCNC: 1.6 MG/DL — SIGNIFICANT CHANGE UP (ref 1.6–2.6)
MCHC RBC-ENTMCNC: 28.4 PG — SIGNIFICANT CHANGE UP (ref 27–34)
MCHC RBC-ENTMCNC: 31.3 GM/DL — LOW (ref 32–36)
MCV RBC AUTO: 90.7 FL — SIGNIFICANT CHANGE UP (ref 80–100)
MONOCYTES # BLD AUTO: 1.18 K/UL — HIGH (ref 0–0.9)
MONOCYTES NFR BLD AUTO: 9.3 % — SIGNIFICANT CHANGE UP (ref 2–14)
NEUTROPHILS # BLD AUTO: 10.26 K/UL — HIGH (ref 1.8–7.4)
NEUTROPHILS NFR BLD AUTO: 80.8 % — HIGH (ref 43–77)
NRBC # BLD: 0 /100 WBCS — SIGNIFICANT CHANGE UP (ref 0–0)
PHOSPHATE SERPL-MCNC: 2.7 MG/DL — SIGNIFICANT CHANGE UP (ref 2.5–4.5)
PLATELET # BLD AUTO: 275 K/UL — SIGNIFICANT CHANGE UP (ref 150–400)
POTASSIUM SERPL-MCNC: 4.5 MMOL/L — SIGNIFICANT CHANGE UP (ref 3.5–5.3)
POTASSIUM SERPL-SCNC: 4.5 MMOL/L — SIGNIFICANT CHANGE UP (ref 3.5–5.3)
PROT SERPL-MCNC: 6.8 G/DL — SIGNIFICANT CHANGE UP (ref 6–8.3)
RBC # BLD: 2.68 M/UL — LOW (ref 4.2–5.8)
RBC # FLD: 14.2 % — SIGNIFICANT CHANGE UP (ref 10.3–14.5)
SODIUM SERPL-SCNC: 134 MMOL/L — LOW (ref 135–145)
SPECIMEN SOURCE: SIGNIFICANT CHANGE UP
WBC # BLD: 12.7 K/UL — HIGH (ref 3.8–10.5)
WBC # FLD AUTO: 12.7 K/UL — HIGH (ref 3.8–10.5)

## 2020-04-26 PROCEDURE — 99233 SBSQ HOSP IP/OBS HIGH 50: CPT | Mod: GC

## 2020-04-26 PROCEDURE — 99232 SBSQ HOSP IP/OBS MODERATE 35: CPT

## 2020-04-26 RX ORDER — ONDANSETRON 8 MG/1
2 TABLET, FILM COATED ORAL ONCE
Refills: 0 | Status: COMPLETED | OUTPATIENT
Start: 2020-04-26 | End: 2020-04-27

## 2020-04-26 RX ORDER — SODIUM CHLORIDE 9 MG/ML
500 INJECTION INTRAMUSCULAR; INTRAVENOUS; SUBCUTANEOUS ONCE
Refills: 0 | Status: COMPLETED | OUTPATIENT
Start: 2020-04-26 | End: 2020-04-26

## 2020-04-26 RX ORDER — MAGNESIUM SULFATE 500 MG/ML
2 VIAL (ML) INJECTION ONCE
Refills: 0 | Status: COMPLETED | OUTPATIENT
Start: 2020-04-26 | End: 2020-04-26

## 2020-04-26 RX ORDER — GABAPENTIN 400 MG/1
200 CAPSULE ORAL DAILY
Refills: 0 | Status: DISCONTINUED | OUTPATIENT
Start: 2020-04-26 | End: 2020-05-01

## 2020-04-26 RX ORDER — ONDANSETRON 8 MG/1
4 TABLET, FILM COATED ORAL ONCE
Refills: 0 | Status: DISCONTINUED | OUTPATIENT
Start: 2020-04-26 | End: 2020-04-26

## 2020-04-26 RX ADMIN — Medication 650 MILLIGRAM(S): at 13:34

## 2020-04-26 RX ADMIN — Medication 650 MILLIGRAM(S): at 05:28

## 2020-04-26 RX ADMIN — HEPARIN SODIUM 7500 UNIT(S): 5000 INJECTION INTRAVENOUS; SUBCUTANEOUS at 05:27

## 2020-04-26 RX ADMIN — CHLORHEXIDINE GLUCONATE 1 APPLICATION(S): 213 SOLUTION TOPICAL at 12:33

## 2020-04-26 RX ADMIN — ATORVASTATIN CALCIUM 10 MILLIGRAM(S): 80 TABLET, FILM COATED ORAL at 22:22

## 2020-04-26 RX ADMIN — SODIUM CHLORIDE 250 MILLILITER(S): 9 INJECTION INTRAMUSCULAR; INTRAVENOUS; SUBCUTANEOUS at 11:33

## 2020-04-26 RX ADMIN — Medication 4 UNIT(S): at 17:38

## 2020-04-26 RX ADMIN — Medication 650 MILLIGRAM(S): at 22:22

## 2020-04-26 RX ADMIN — Medication 1 TABLET(S): at 10:11

## 2020-04-26 RX ADMIN — Medication 50 MICROGRAM(S): at 05:28

## 2020-04-26 RX ADMIN — Medication 500 MILLIGRAM(S): at 10:11

## 2020-04-26 RX ADMIN — Medication 1 TABLET(S): at 22:22

## 2020-04-26 RX ADMIN — HEPARIN SODIUM 7500 UNIT(S): 5000 INJECTION INTRAVENOUS; SUBCUTANEOUS at 22:22

## 2020-04-26 RX ADMIN — Medication 100 GRAM(S): at 10:12

## 2020-04-26 RX ADMIN — HEPARIN SODIUM 7500 UNIT(S): 5000 INJECTION INTRAVENOUS; SUBCUTANEOUS at 13:35

## 2020-04-26 RX ADMIN — Medication 4 UNIT(S): at 13:20

## 2020-04-26 RX ADMIN — AMLODIPINE BESYLATE 10 MILLIGRAM(S): 2.5 TABLET ORAL at 05:28

## 2020-04-26 RX ADMIN — GABAPENTIN 200 MILLIGRAM(S): 400 CAPSULE ORAL at 13:34

## 2020-04-26 RX ADMIN — INSULIN GLARGINE 8 UNIT(S): 100 INJECTION, SOLUTION SUBCUTANEOUS at 22:11

## 2020-04-26 RX ADMIN — PANTOPRAZOLE SODIUM 40 MILLIGRAM(S): 20 TABLET, DELAYED RELEASE ORAL at 17:39

## 2020-04-26 RX ADMIN — Medication 4 UNIT(S): at 09:06

## 2020-04-26 RX ADMIN — PANTOPRAZOLE SODIUM 40 MILLIGRAM(S): 20 TABLET, DELAYED RELEASE ORAL at 05:27

## 2020-04-26 NOTE — PROGRESS NOTE ADULT - SUBJECTIVE AND OBJECTIVE BOX
STATUS POST:      INTERVAL HPI/OVERNIGHT EVENTS:      SUBJECTIVE:     amLODIPine   Tablet 10 milliGRAM(s) Oral daily  heparin   Injectable 7500 Unit(s) SubCutaneous every 8 hours  rifAXIMin 550 milliGRAM(s) Oral two times a day      Vital Signs Last 24 Hrs  T(C): 36.9 (26 Apr 2020 06:30), Max: 37.4 (25 Apr 2020 13:45)  T(F): 98.5 (26 Apr 2020 06:30), Max: 99.3 (25 Apr 2020 13:45)  HR: 73 (26 Apr 2020 06:30) (73 - 81)  BP: 159/70 (26 Apr 2020 06:30) (153/67 - 159/70)  BP(mean): --  RR: 19 (26 Apr 2020 06:30) (19 - 19)  SpO2: 96% (26 Apr 2020 06:30) (96% - 99%)  I&O's Detail    25 Apr 2020 07:01  -  26 Apr 2020 07:00  --------------------------------------------------------  IN:  Total IN: 0 mL    OUT:    Ileostomy: 550 mL    Voided: 525 mL  Total OUT: 1075 mL    Total NET: -1075 mL          Physical Exam:  General:   Pulmonary:   Cardiovascular:   Abdominal:   Extremities:   Neuro:   Pulses:         LABS:                        7.6    12.70 )-----------( 275      ( 26 Apr 2020 07:59 )             24.3     04-26    134<L>  |  101  |  31<H>  ----------------------------<  109<H>  4.5   |  20<L>  |  4.07<H>    Ca    8.4      26 Apr 2020 07:59  Phos  2.7     04-26  Mg     1.6     04-26    TPro  6.8  /  Alb  2.9<L>  /  TBili  0.3  /  DBili  x   /  AST  22  /  ALT  8<L>  /  AlkPhos  99  04-26          RADIOLOGY & ADDITIONAL STUDIES: INTERVAL HPI/OVERNIGHT EVENTS: restarted Lomotil , 500cc ostomy output replaced     SUBJECTIVE: Examined by chief resident, resting comfortably. This morning, he feels well; his pain is well-controlled. No nausea or vomiting. No acute complaints.     amLODIPine   Tablet 10 milliGRAM(s) Oral daily  heparin   Injectable 7500 Unit(s) SubCutaneous every 8 hours  rifAXIMin 550 milliGRAM(s) Oral two times a day      Vital Signs Last 24 Hrs  T(C): 36.9 (26 Apr 2020 06:30), Max: 37.4 (25 Apr 2020 13:45)  T(F): 98.5 (26 Apr 2020 06:30), Max: 99.3 (25 Apr 2020 13:45)  HR: 73 (26 Apr 2020 06:30) (73 - 81)  BP: 159/70 (26 Apr 2020 06:30) (153/67 - 159/70)  BP(mean): --  RR: 19 (26 Apr 2020 06:30) (19 - 19)  SpO2: 96% (26 Apr 2020 06:30) (96% - 99%)  I&O's Detail    25 Apr 2020 07:01  -  26 Apr 2020 07:00  --------------------------------------------------------  IN:  Total IN: 0 mL    OUT:    Ileostomy: 550 mL    Voided: 525 mL  Total OUT: 1075 mL    Total NET: -1075 mL          Physical Exam:  General: Restimg comfortably, no acute complaints  Pulmonary: non labored  Cardiovascular: regular rate  Abdominal: soft, non distended, non tender, dressing over wound changed, ostomy with brown stool    Extremities: wwp, no edema          LABS:                        7.6    12.70 )-----------( 275      ( 26 Apr 2020 07:59 )             24.3     04-26    134<L>  |  101  |  31<H>  ----------------------------<  109<H>  4.5   |  20<L>  |  4.07<H>    Ca    8.4      26 Apr 2020 07:59  Phos  2.7     04-26  Mg     1.6     04-26    TPro  6.8  /  Alb  2.9<L>  /  TBili  0.3  /  DBili  x   /  AST  22  /  ALT  8<L>  /  AlkPhos  99  04-26          RADIOLOGY & ADDITIONAL STUDIES:

## 2020-04-26 NOTE — PROVIDER CONTACT NOTE (OTHER) - SITUATION
pt stated that he was considering suicide and when asked to elaborate, said that he needs full time care and can't go to a shelter with his ileostomy and wanted to die.

## 2020-04-26 NOTE — PROGRESS NOTE ADULT - SUBJECTIVE AND OBJECTIVE BOX
OVERNIGHT EVENTS: Reporting suicidal ideation, no plans but does endorse wanting to kill himself due to all the comorbidities he had. Ordered for enhanced supervision. Got zofran x1 for nausea.    SUBJECTIVE / INTERVAL HPI: Patient seen and examined at bedside. Reports that he feels badly this morning; is upset that we are not giving him his home pain medications such as gabapentin, lyrica, or neurontin). Explained that due to his kidney failure, we are holding for now but will consider starting today. Reports that his belly was leaking and dressing had to be changed. Does endorse suicidal ideation when explicitly asked and reports that it is because he is not getting his pain medications here at this hospital, when the [facility] did.    VITAL SIGNS:  Vital Signs Last 24 Hrs  T(C): 36.9 (26 Apr 2020 06:30), Max: 37.4 (25 Apr 2020 13:45)  T(F): 98.5 (26 Apr 2020 06:30), Max: 99.3 (25 Apr 2020 13:45)  HR: 73 (26 Apr 2020 06:30) (73 - 81)  BP: 159/70 (26 Apr 2020 06:30) (153/67 - 159/70)  BP(mean): --  RR: 19 (26 Apr 2020 06:30) (19 - 19)  SpO2: 96% (26 Apr 2020 06:30) (96% - 99%)    PHYSICAL EXAM:    General: middle aged male in no acute distress, grouchy demeanor, breathing comfortably on RA  HEENT: NC/AT; PERRL, anicteric sclera; MMM  Respiratory: no increased work of breathing with no accessory muscle use; on RA  Gastrointestinal: distended, mildly firm, mildly tender diffusely; with dressing over wound and ostomy bag in place draining brown stool   Extremities: WWP; no edema, clubbing or cyanosis  Neurological: AAOx3; no focal deficits    MEDICATIONS:  MEDICATIONS  (STANDING):  amLODIPine   Tablet 10 milliGRAM(s) Oral daily  atorvastatin 10 milliGRAM(s) Oral at bedtime  chlorhexidine 2% Cloths 1 Application(s) Topical daily  dextrose 5%. 1000 milliLiter(s) (50 mL/Hr) IV Continuous <Continuous>  dextrose 50% Injectable 12.5 Gram(s) IV Push once  dextrose 50% Injectable 25 Gram(s) IV Push once  dextrose 50% Injectable 25 Gram(s) IV Push once  diphenoxylate/atropine 1 Tablet(s) Oral every 12 hours  heparin   Injectable 7500 Unit(s) SubCutaneous every 8 hours  insulin glargine Injectable (LANTUS) 8 Unit(s) SubCutaneous at bedtime  insulin lispro (HumaLOG) corrective regimen sliding scale   SubCutaneous Before meals and at bedtime  insulin lispro Injectable (HumaLOG) 4 Unit(s) SubCutaneous three times a day before meals  levothyroxine 50 MICROGram(s) Oral daily  magnesium sulfate  IVPB 2 Gram(s) IV Intermittent once  pantoprazole    Tablet 40 milliGRAM(s) Oral every 12 hours  rifAXIMin 550 milliGRAM(s) Oral two times a day  sodium bicarbonate 650 milliGRAM(s) Oral three times a day    MEDICATIONS  (PRN):  acetaminophen   Tablet .. 500 milliGRAM(s) Oral every 6 hours PRN Temp greater or equal to 38C (100.4F), Mild Pain (1 - 3)  dextrose 40% Gel 15 Gram(s) Oral once PRN Blood Glucose LESS THAN 70 milliGRAM(s)/deciliter  glucagon  Injectable 1 milliGRAM(s) IntraMuscular once PRN Glucose LESS THAN 70 milligrams/deciliter  ondansetron Injectable 2 milliGRAM(s) IV Push once PRN Nausea  traZODone 50 milliGRAM(s) Oral at bedtime PRN Sleep      ALLERGIES:  Allergies    No Known Allergies    Intolerances        LABS:                        7.6    12.70 )-----------( 275      ( 26 Apr 2020 07:59 )             24.3     04-26    134<L>  |  101  |  31<H>  ----------------------------<  109<H>  4.5   |  20<L>  |  4.07<H>    Ca    8.4      26 Apr 2020 07:59  Phos  2.7     04-26  Mg     1.6     04-26    TPro  6.8  /  Alb  2.9<L>  /  TBili  0.3  /  DBili  x   /  AST  22  /  ALT  8<L>  /  AlkPhos  99  04-26        CAPILLARY BLOOD GLUCOSE      POCT Blood Glucose.: 114 mg/dL (26 Apr 2020 08:13)      RADIOLOGY & ADDITIONAL TESTS: Reviewed.

## 2020-04-26 NOTE — PROGRESS NOTE ADULT - PROBLEM SELECTOR PLAN 2
Decompensated cirrhosis likely 2/2 alcohol use, c/b ascites, hepatic encephalopathy on admission (resolved) sbp ruled out  - CT abd/pelvis with persistent large ascites and peritonitis; bilateral pleural effusions and pericardial effusion  - MELD-Na score 28  - s/p paracentesis on 4/10 with negative cytology but not sent for cell count/culture, however was on zosyn for 2 weeks so GI with no concerns for SBP   - repeat paracentesis 4/19, but unable to aspirate enough fluid?   - SAAG 1.4 consistent with portal hypertension as etiology of ascites  - continue rifaximin 550mg BID (changed from lactulose) for hepatic encephalopathy   - holding lactulose for high ostomy output  - unclear if pt with varices- pt refuses gi workup- monitoring hb and active T+S transfuse if needed  - Hgb slowly dropping, however ileostomy without melanotic stool  - pt refusing video capsule endoscopy this admission; explained risks of not undergoing procedure and pt understands and repeats back risks of refusing procedure.   - pt needs screening for HCC outpatient and follow up with hepatology outpatient

## 2020-04-26 NOTE — PROGRESS NOTE ADULT - PROBLEM SELECTOR PLAN 8
- restart home half dose trazodone 50mg qhs prn (100mg home dose) - restart home half dose trazodone 50mg qhs prn (100mg home dose)    #suicidal ideation   Pt endorsing SI without a plan.   - f/u psychiatry consult

## 2020-04-26 NOTE — PROGRESS NOTE ADULT - ASSESSMENT
61 y/o M with PMH of HTN, DM complicated by neuropathy, EtOH liver cirrhosis, presenting w/ pneumatosis of unknown etiology, s/p sub total colectomy and end ileostomy on 3/31, c/b fever and purulent wound infection (4/9) now s/p paracentesis 4/13 found to be COVID (+). Clinically stable.    - f/o Ileostomy output  - c/w Lomotil  - f/u Renal recs  - care per primary team  - plan discussed with chief resident and attending

## 2020-04-26 NOTE — PROGRESS NOTE ADULT - ASSESSMENT
61 y/o M with PMH of HTN, DM complicated by neuropathy, IVDU, ETOH liver cirrhosis, who presents to [facility] for fall at home. At [facility] patient reported that he takes lactulose at home daily and has daily BM, but he had no BM for 5 days prior to admission. 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 Patient underwent ex lap and subtotal colectomy, and ileostomy on 3/31.  COVID +     MEG (acute kidney injury)   likely ATN  non oliguric  electrolytes noted  cr continues to trend down @ 4.07  ileostomy output noted  continue with po sodium bicarbonate 650 mg tid  renal diet     # Hyponatremia   mild  stable @ 134  encourage po intake  pt does not need IVF   will continue monitoring     HTN  on amlodipine 10 mg po daily    anemia  transfuse prn Hb < 7  check iron panel

## 2020-04-26 NOTE — PROGRESS NOTE ADULT - ASSESSMENT
59 y/o M with PMH of HTN, DM complicated by neuropathy, IVDU, ETOH liver cirrhosis, who presents to [facility] for fall at home; found to have toxic megacolon and pneumatosis; underwent ex lap and subtotal colectomy, and ileostomy on 3/31. Developed COVID 19 while in the hospital, but subsequent test was positive, patient was transferred to tele COVID unit without need for supplemental oxygen. Now on RA, on wet to dry dressing, walked with PT, MEG improving, stable for dispo to detention when set up with [facility].

## 2020-04-26 NOTE — PROGRESS NOTE ADULT - PROBLEM SELECTOR PLAN 3
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   - s/p laparotomy with wound vac in place (gen surg following)   - ileostomy in place draining brown stool with no signs of bleeding  - general surgery following, appreciate recs   - gen surg recommending lomotil- low dose to prevent constipation/ hepatic encephalopthy  - has wet to dry dressing on surgical wound.    #wound vac  - would care NP following and continue teaching to patient on wound vac care and instructions  - will need wound care on discharge Toxic megacolon s/p ex lap and subtotal colectomy and ileostomy 3/31; course c/b fever and purulent wound infection (4/9), treated with vanc (4/19-4/13) and zosyn.   - s/p laparotomy with wound vac in place (gen surg following)   - ileostomy in place draining brown stool with no signs of bleeding  - general surgery following, appreciate recs   - gen surg recommending lomotil- low dose to prevent constipation/ hepatic encephalopthy  - high ileostomy output overnight, will give back 500cc bolus today  - has wet to dry dressing on surgical wound.    #s/p wound vac  - would care NP following and continue teaching to patient on wound vac care and instructions  - now has wet to dry dressing, surgery following

## 2020-04-26 NOTE — PROVIDER CONTACT NOTE (OTHER) - ACTION/TREATMENT ORDERED:
AM lab draws rescheduled for 12PM.
Diet changed to NPO.
Tylenol 1g IV ordered and given.
Tylenol 1g ordered and given. Blood cultures collected. Urinalysis ordered and pending collection.
constant obs one-to-one ordered

## 2020-04-26 NOTE — PROGRESS NOTE ADULT - PROBLEM SELECTOR PLAN 5
Normocytic anemia. Likely 2/2 anemia of chronic disease and kidney dysfunction vs acute blood loss. Hgb 6.9 4/24, ileostomy output with brown stool   -monitor hb, transfuse <7  - s/p 4U pRBCs total this admission as of 4/24  - continue po pantoprazole 40mg BID   - monitor for signs of bleeding   - GI following and planned for video capsule endoscopy this admission, but pt refused

## 2020-04-26 NOTE — PROGRESS NOTE ADULT - ATTENDING COMMENTS
Patient seen and examined. Discussed case with housestaff and agree with above assessment and plan with addendum:    Patient complaining of pain. Overnight events noted about suicidal ideation. Due to pain control and also upset about multiple medical problems. Will start low dose Gabapentin to address pain. Currently on 1 to 1. Will call psych for official consult. Do not believe at this time patient is imminent threat to self but will treat as such.     Ostomy output slowing down after lomotil. Will give another 500mL bolus NS to replete losses.

## 2020-04-26 NOTE — PROGRESS NOTE ADULT - PROBLEM SELECTOR PLAN 7
- continue home dose of amlodipine at 10mg daily (10mg starting 4/24)  - restart home pravastatin 40mg daily; therapeutic interchange with atorvastatin 10mg  -holding home hctz and lisinopril given MEG/ ARF

## 2020-04-26 NOTE — PROGRESS NOTE ADULT - SUBJECTIVE AND OBJECTIVE BOX
Patient is a 60y Male seen and evaluated at bedside.   pt seen and discussed with team  voided about 525 cc of urine  ileostomy output @ 570cc  labs noted    acetaminophen   Tablet .. 500 every 6 hours PRN  amLODIPine   Tablet 10 daily  atorvastatin 10 at bedtime  chlorhexidine 2% Cloths 1 daily  dextrose 40% Gel 15 once PRN  dextrose 5%. 1000 <Continuous>  dextrose 50% Injectable 12.5 once  dextrose 50% Injectable 25 once  dextrose 50% Injectable 25 once  diphenoxylate/atropine 1 every 12 hours  gabapentin 200 daily  glucagon  Injectable 1 once PRN  heparin   Injectable 7500 every 8 hours  insulin glargine Injectable (LANTUS) 8 at bedtime  insulin lispro (HumaLOG) corrective regimen sliding scale  Before meals and at bedtime  insulin lispro Injectable (HumaLOG) 4 three times a day before meals  levothyroxine 50 daily  ondansetron Injectable 2 once PRN  pantoprazole    Tablet 40 every 12 hours  rifAXIMin 550 two times a day  sodium bicarbonate 650 three times a day  sodium chloride 0.9% Bolus 500 once  traZODone 50 at bedtime PRN      Allergies    No Known Allergies    Intolerances        T(C): , Max: 37.4 (04-25-20 @ 13:45)  T(F): , Max: 99.3 (04-25-20 @ 13:45)  HR: 73 (04-26-20 @ 06:30)  BP: 159/70 (04-26-20 @ 06:30)  BP(mean): --  RR: 19 (04-26-20 @ 06:30)  SpO2: 96% (04-26-20 @ 06:30)  Wt(kg): --    04-25 @ 07:01  -  04-26 @ 07:00  --------------------------------------------------------  IN: 0 mL / OUT: 1075 mL / NET: -1075 mL          Review of Systems:  deferred due to COVID 19      PHYSICAL EXAM:  deferred due to COVID 19              LABS:                        7.6    12.70 )-----------( 275      ( 26 Apr 2020 07:59 )             24.3     04-26    134<L>  |  101  |  31<H>  ----------------------------<  109<H>  4.5   |  20<L>  |  4.07<H>    Ca    8.4      26 Apr 2020 07:59  Phos  2.7     04-26  Mg     1.6     04-26    TPro  6.8  /  Alb  2.9<L>  /  TBili  0.3  /  DBili  x   /  AST  22  /  ALT  8<L>  /  AlkPhos  99  04-26                RADIOLOGY & ADDITIONAL STUDIES: Patient is a 60y Male seen and evaluated at bedside.   pt seen and discussed with team  voided about 525 cc of urine  ileostomy output @ 570cc  labs noted    acetaminophen   Tablet .. 500 every 6 hours PRN  amLODIPine   Tablet 10 daily  atorvastatin 10 at bedtime  chlorhexidine 2% Cloths 1 daily  dextrose 40% Gel 15 once PRN  dextrose 5%. 1000 <Continuous>  dextrose 50% Injectable 12.5 once  dextrose 50% Injectable 25 once  dextrose 50% Injectable 25 once  diphenoxylate/atropine 1 every 12 hours  gabapentin 200 daily  glucagon  Injectable 1 once PRN  heparin   Injectable 7500 every 8 hours  insulin glargine Injectable (LANTUS) 8 at bedtime  insulin lispro (HumaLOG) corrective regimen sliding scale  Before meals and at bedtime  insulin lispro Injectable (HumaLOG) 4 three times a day before meals  levothyroxine 50 daily  ondansetron Injectable 2 once PRN  pantoprazole    Tablet 40 every 12 hours  rifAXIMin 550 two times a day  sodium bicarbonate 650 three times a day  sodium chloride 0.9% Bolus 500 once  traZODone 50 at bedtime PRN      Allergies    No Known Allergies    Intolerances        T(C): , Max: 37.4 (04-25-20 @ 13:45)  T(F): , Max: 99.3 (04-25-20 @ 13:45)  HR: 73 (04-26-20 @ 06:30)  BP: 159/70 (04-26-20 @ 06:30)  BP(mean): --  RR: 19 (04-26-20 @ 06:30)  SpO2: 96% (04-26-20 @ 06:30)  Wt(kg): --    04-25 @ 07:01  -  04-26 @ 07:00  --------------------------------------------------------  IN: 0 mL / OUT: 1075 mL / NET: -1075 mL                PHYSICAL EXAM:  limited due to COVID 19  NAD , alert,.on RA O2  no JVD  heart RRR  abd distended  ext no edema              LABS:                        7.6    12.70 )-----------( 275      ( 26 Apr 2020 07:59 )             24.3     04-26    134<L>  |  101  |  31<H>  ----------------------------<  109<H>  4.5   |  20<L>  |  4.07<H>    Ca    8.4      26 Apr 2020 07:59  Phos  2.7     04-26  Mg     1.6     04-26    TPro  6.8  /  Alb  2.9<L>  /  TBili  0.3  /  DBili  x   /  AST  22  /  ALT  8<L>  /  AlkPhos  99  04-26                RADIOLOGY & ADDITIONAL STUDIES:

## 2020-04-26 NOTE — PROGRESS NOTE ADULT - PROBLEM SELECTOR PLAN 9
F: none  E: cautious given renal failure  N: low fiber diet  GI ppx: protonix BID   DVT ppx: heparin 7500U q12h; DVT neg by ultrasound

## 2020-04-26 NOTE — PROGRESS NOTE ADULT - PROBLEM SELECTOR PLAN 4
Nonoliguric MEG 2/2 likey ATN, per nephrology. Per renal, likely combination of pre-renal, intrinsic cause 2/2 COVID-19 and contrast exposure on 4/10 vs vancomycin toxicity. IMPROVING  - previously on albumin, octreotide, and midodrine for albumin trial for possible hepatorenal syndrome, but without improvement in kidney function  - FeNa 2.0% and FeUrea 63% intrinsic disease  - Cr downtrending, renal says no indication of HD; follow up outpatient  - net even fluid balance   - renal recommending gentle IV hydration if high GI output  - avoid ACE/ARB/NSAIDs/metformin/contrast   - strict I/Os   - appreciate renal recs    #hyponatremia   RESOLVED     #metabolic acidosis   - sodium bicarbonate tabs TID per renal

## 2020-04-26 NOTE — PROGRESS NOTE ADULT - PROBLEM SELECTOR PLAN 6
Type 2 DM, endocrinology following   - continue lantus 8U qhs   - continue lispro 4U TID premeals  - moderate SSI   - FSG goal 140-180    #hypothyroidism   - TSH 2.813; TPO and antithyroid Abs neg   - continue levothyroxine 50mcg   - appreciate endocrinology recs Type 2 DM, endocrinology following   - continue lantus 8U qhs   - continue lispro 4U TID premeals  - moderate SSI   - FSG goal 140-180    #hypothyroidism   - TSH 2.813; TPO and antithyroid Abs neg   - continue levothyroxine 50mcg   - appreciate endocrinology recs    #neuropathy   - per pt, pt takes gabapentin 1200mg TID; would like to restart (not getting pain meds is part of reason he is having SI). explained that due to renal failure, we were holding   - CrCl 25 today; will restart gabapentin 200mg daily today

## 2020-04-27 DIAGNOSIS — F43.22 ADJUSTMENT DISORDER WITH ANXIETY: ICD-10-CM

## 2020-04-27 DIAGNOSIS — G31.84 MILD COGNITIVE IMPAIRMENT OF UNCERTAIN OR UNKNOWN ETIOLOGY: ICD-10-CM

## 2020-04-27 LAB
ALBUMIN SERPL ELPH-MCNC: 3.6 G/DL — SIGNIFICANT CHANGE UP (ref 3.3–5)
ALP SERPL-CCNC: 121 U/L — HIGH (ref 40–120)
ALT FLD-CCNC: 9 U/L — LOW (ref 10–45)
ANION GAP SERPL CALC-SCNC: 15 MMOL/L — SIGNIFICANT CHANGE UP (ref 5–17)
AST SERPL-CCNC: 22 U/L — SIGNIFICANT CHANGE UP (ref 10–40)
BASOPHILS # BLD AUTO: 0.04 K/UL — SIGNIFICANT CHANGE UP (ref 0–0.2)
BASOPHILS NFR BLD AUTO: 0.3 % — SIGNIFICANT CHANGE UP (ref 0–2)
BILIRUB SERPL-MCNC: 0.3 MG/DL — SIGNIFICANT CHANGE UP (ref 0.2–1.2)
BUN SERPL-MCNC: 33 MG/DL — HIGH (ref 7–23)
CALCIUM SERPL-MCNC: 8.9 MG/DL — SIGNIFICANT CHANGE UP (ref 8.4–10.5)
CHLORIDE SERPL-SCNC: 100 MMOL/L — SIGNIFICANT CHANGE UP (ref 96–108)
CO2 SERPL-SCNC: 23 MMOL/L — SIGNIFICANT CHANGE UP (ref 22–31)
CREAT SERPL-MCNC: 3.64 MG/DL — HIGH (ref 0.5–1.3)
CRP SERPL-MCNC: 13.66 MG/DL — HIGH (ref 0–0.4)
D DIMER BLD IA.RAPID-MCNC: 2648 NG/ML DDU — HIGH
EOSINOPHIL # BLD AUTO: 0.28 K/UL — SIGNIFICANT CHANGE UP (ref 0–0.5)
EOSINOPHIL NFR BLD AUTO: 2.1 % — SIGNIFICANT CHANGE UP (ref 0–6)
FERRITIN SERPL-MCNC: 1001 NG/ML — HIGH (ref 30–400)
GLUCOSE BLDC GLUCOMTR-MCNC: 139 MG/DL — HIGH (ref 70–99)
GLUCOSE BLDC GLUCOMTR-MCNC: 150 MG/DL — HIGH (ref 70–99)
GLUCOSE BLDC GLUCOMTR-MCNC: 162 MG/DL — HIGH (ref 70–99)
GLUCOSE BLDC GLUCOMTR-MCNC: 194 MG/DL — HIGH (ref 70–99)
GLUCOSE SERPL-MCNC: 135 MG/DL — HIGH (ref 70–99)
HCT VFR BLD CALC: 28.6 % — LOW (ref 39–50)
HGB BLD-MCNC: 9.3 G/DL — LOW (ref 13–17)
IMM GRANULOCYTES NFR BLD AUTO: 1.2 % — SIGNIFICANT CHANGE UP (ref 0–1.5)
LYMPHOCYTES # BLD AUTO: 0.81 K/UL — LOW (ref 1–3.3)
LYMPHOCYTES # BLD AUTO: 6.1 % — LOW (ref 13–44)
MAGNESIUM SERPL-MCNC: 1.8 MG/DL — SIGNIFICANT CHANGE UP (ref 1.6–2.6)
MCHC RBC-ENTMCNC: 29.2 PG — SIGNIFICANT CHANGE UP (ref 27–34)
MCHC RBC-ENTMCNC: 32.5 GM/DL — SIGNIFICANT CHANGE UP (ref 32–36)
MCV RBC AUTO: 89.7 FL — SIGNIFICANT CHANGE UP (ref 80–100)
MONOCYTES # BLD AUTO: 1.09 K/UL — HIGH (ref 0–0.9)
MONOCYTES NFR BLD AUTO: 8.2 % — SIGNIFICANT CHANGE UP (ref 2–14)
NEUTROPHILS # BLD AUTO: 10.9 K/UL — HIGH (ref 1.8–7.4)
NEUTROPHILS NFR BLD AUTO: 82.1 % — HIGH (ref 43–77)
NRBC # BLD: 0 /100 WBCS — SIGNIFICANT CHANGE UP (ref 0–0)
PHOSPHATE SERPL-MCNC: 3.2 MG/DL — SIGNIFICANT CHANGE UP (ref 2.5–4.5)
PLATELET # BLD AUTO: 299 K/UL — SIGNIFICANT CHANGE UP (ref 150–400)
POTASSIUM SERPL-MCNC: 4.5 MMOL/L — SIGNIFICANT CHANGE UP (ref 3.5–5.3)
POTASSIUM SERPL-SCNC: 4.5 MMOL/L — SIGNIFICANT CHANGE UP (ref 3.5–5.3)
PROT SERPL-MCNC: 7.8 G/DL — SIGNIFICANT CHANGE UP (ref 6–8.3)
RBC # BLD: 3.19 M/UL — LOW (ref 4.2–5.8)
RBC # FLD: 14.2 % — SIGNIFICANT CHANGE UP (ref 10.3–14.5)
SODIUM SERPL-SCNC: 138 MMOL/L — SIGNIFICANT CHANGE UP (ref 135–145)
WBC # BLD: 13.28 K/UL — HIGH (ref 3.8–10.5)
WBC # FLD AUTO: 13.28 K/UL — HIGH (ref 3.8–10.5)

## 2020-04-27 PROCEDURE — 99232 SBSQ HOSP IP/OBS MODERATE 35: CPT

## 2020-04-27 PROCEDURE — 99223 1ST HOSP IP/OBS HIGH 75: CPT | Mod: 95

## 2020-04-27 RX ORDER — DIPHENOXYLATE HCL/ATROPINE 2.5-.025MG
1 TABLET ORAL DAILY
Refills: 0 | Status: DISCONTINUED | OUTPATIENT
Start: 2020-04-28 | End: 2020-05-05

## 2020-04-27 RX ADMIN — Medication 650 MILLIGRAM(S): at 13:14

## 2020-04-27 RX ADMIN — PANTOPRAZOLE SODIUM 40 MILLIGRAM(S): 20 TABLET, DELAYED RELEASE ORAL at 05:33

## 2020-04-27 RX ADMIN — HEPARIN SODIUM 7500 UNIT(S): 5000 INJECTION INTRAVENOUS; SUBCUTANEOUS at 13:14

## 2020-04-27 RX ADMIN — Medication 2: at 22:40

## 2020-04-27 RX ADMIN — CHLORHEXIDINE GLUCONATE 1 APPLICATION(S): 213 SOLUTION TOPICAL at 11:25

## 2020-04-27 RX ADMIN — GABAPENTIN 200 MILLIGRAM(S): 400 CAPSULE ORAL at 11:02

## 2020-04-27 RX ADMIN — ATORVASTATIN CALCIUM 10 MILLIGRAM(S): 80 TABLET, FILM COATED ORAL at 22:27

## 2020-04-27 RX ADMIN — Medication 1 TABLET(S): at 09:12

## 2020-04-27 RX ADMIN — HEPARIN SODIUM 7500 UNIT(S): 5000 INJECTION INTRAVENOUS; SUBCUTANEOUS at 05:32

## 2020-04-27 RX ADMIN — Medication 4 UNIT(S): at 16:59

## 2020-04-27 RX ADMIN — Medication 50 MILLIGRAM(S): at 00:59

## 2020-04-27 RX ADMIN — Medication 50 MICROGRAM(S): at 05:32

## 2020-04-27 RX ADMIN — PANTOPRAZOLE SODIUM 40 MILLIGRAM(S): 20 TABLET, DELAYED RELEASE ORAL at 16:59

## 2020-04-27 RX ADMIN — Medication 650 MILLIGRAM(S): at 05:31

## 2020-04-27 RX ADMIN — AMLODIPINE BESYLATE 10 MILLIGRAM(S): 2.5 TABLET ORAL at 05:31

## 2020-04-27 RX ADMIN — HEPARIN SODIUM 7500 UNIT(S): 5000 INJECTION INTRAVENOUS; SUBCUTANEOUS at 22:28

## 2020-04-27 RX ADMIN — Medication 4 UNIT(S): at 12:01

## 2020-04-27 RX ADMIN — Medication 2: at 12:01

## 2020-04-27 RX ADMIN — Medication 500 MILLIGRAM(S): at 19:18

## 2020-04-27 RX ADMIN — Medication 500 MILLIGRAM(S): at 10:50

## 2020-04-27 RX ADMIN — Medication 4 UNIT(S): at 09:11

## 2020-04-27 RX ADMIN — ONDANSETRON 2 MILLIGRAM(S): 8 TABLET, FILM COATED ORAL at 22:27

## 2020-04-27 RX ADMIN — Medication 650 MILLIGRAM(S): at 22:29

## 2020-04-27 RX ADMIN — INSULIN GLARGINE 8 UNIT(S): 100 INJECTION, SOLUTION SUBCUTANEOUS at 22:39

## 2020-04-27 NOTE — BEHAVIORAL HEALTH ASSESSMENT NOTE - NSBHCHARTREVIEWVS_PSY_A_CORE FT
Vital Signs Last 24 Hrs  T(C): 36.9 (27 Apr 2020 13:32), Max: 37 (27 Apr 2020 05:43)  T(F): 98.5 (27 Apr 2020 13:32), Max: 98.6 (27 Apr 2020 05:43)  HR: 98 (27 Apr 2020 14:00) (74 - 98)  BP: 159/69 (27 Apr 2020 14:00) (156/67 - 167/73)  BP(mean): --  RR: 18 (27 Apr 2020 13:32) (18 - 18)  SpO2: 95% (27 Apr 2020 14:00) (94% - 95%)

## 2020-04-27 NOTE — PROGRESS NOTE ADULT - ASSESSMENT
59 y/o M with PMH of HTN, DM complicated by neuropathy, EtOH liver cirrhosis, presenting w/ pneumatosis of unknown etiology, s/p sub total colectomy and end ileostomy on 3/31, c/b fever and purulent wound infection (4/9) now s/p paracentesis 4/13 found to be COVID (+). Clinically stable.    - f/o Ileostomy output  - c/w Lomotil  - f/u Renal recs  - care per primary team  - plan discussed with chief resident and attending

## 2020-04-27 NOTE — PROGRESS NOTE ADULT - SUBJECTIVE AND OBJECTIVE BOX
INTERVAL HPI/OVERNIGHT EVENTS: LUCA     SUBJECTIVE: Examined with chief resident at the bedside, resting comfortably. This morning, he feels well; his pain is well-controlled. No nausea or vomiting. No acute complaints.     amLODIPine   Tablet 10 milliGRAM(s) Oral daily  heparin   Injectable 7500 Unit(s) SubCutaneous every 8 hours  rifAXIMin 550 milliGRAM(s) Oral two times a day      Vital Signs Last 24 Hrs  T(C): 37 (27 Apr 2020 05:43), Max: 37 (27 Apr 2020 05:43)  T(F): 98.6 (27 Apr 2020 05:43), Max: 98.6 (27 Apr 2020 05:43)  HR: 77 (27 Apr 2020 05:43) (77 - 79)  BP: 167/73 (27 Apr 2020 05:43) (142/55 - 167/73)  BP(mean): --  RR: 18 (27 Apr 2020 05:43) (18 - 18)  SpO2: 95% (27 Apr 2020 05:43) (94% - 98%)  I&O's Detail    26 Apr 2020 07:01  -  27 Apr 2020 07:00  --------------------------------------------------------  IN:  Total IN: 0 mL    OUT:    Ileostomy: 350 mL  Total OUT: 350 mL    Total NET: -350 mL          General: NAD, resting comfortably in bed  C/V: NSR  Pulm: Nonlabored breathing, no respiratory distress  Abd: soft, non-tender, non-distended.  Extrem: WWP, no edema,    Physical Exam:  General: NAD, resting comfortably in bed   Pulmonary: non labored breathing, no respiratory distress  Cardiovascular: RRR   Abdominal: soft, mildly distended, wound with granulation, wet to dry dressing change, ostomy with liquid stool    Extremities: wwp, no edema          LABS:                        9.3    13.28 )-----------( 299      ( 27 Apr 2020 09:33 )             28.6     04-27    138  |  100  |  33<H>  ----------------------------<  135<H>  4.5   |  23  |  3.64<H>    Ca    8.9      27 Apr 2020 09:28  Phos  3.2     04-27  Mg     1.8     04-27    TPro  7.8  /  Alb  3.6  /  TBili  0.3  /  DBili  x   /  AST  22  /  ALT  9<L>  /  AlkPhos  121<H>  04-27          RADIOLOGY & ADDITIONAL STUDIES:

## 2020-04-27 NOTE — PROGRESS NOTE ADULT - SUBJECTIVE AND OBJECTIVE BOX
INTERVAL HPI/OVERNIGHT EVENTS:    Patient is a 60y old  Male who presents with a chief complaint of Colectomy (19 Apr 2020 10:46)  Spoke to the primary team taking care of the patient.  Monitoring renal function  Patient was having some suicidal ideations - psych has been consulted. Denied any active thoughts today.  Saturating well on RA  FSg and insulin administration reviewed  appetite was fair    Pt reports the following symptoms:  CONSTITUTIONAL:  Negative chills  CARDIOVASCULAR:  Negative for chest pain or palpitations  RESPIRATORY:  Negative for cough, wheezing  GASTROINTESTINAL:  Negative for diarrhea, constipation  NEUROLOGIC:  No headache, confusion, dizziness, lightheadedness    MEDICATIONS  (STANDING):  amLODIPine   Tablet 10 milliGRAM(s) Oral daily  atorvastatin 10 milliGRAM(s) Oral at bedtime  chlorhexidine 2% Cloths 1 Application(s) Topical daily  dextrose 5%. 1000 milliLiter(s) (50 mL/Hr) IV Continuous <Continuous>  dextrose 50% Injectable 12.5 Gram(s) IV Push once  dextrose 50% Injectable 25 Gram(s) IV Push once  dextrose 50% Injectable 25 Gram(s) IV Push once  gabapentin 200 milliGRAM(s) Oral daily  heparin   Injectable 7500 Unit(s) SubCutaneous every 8 hours  insulin glargine Injectable (LANTUS) 8 Unit(s) SubCutaneous at bedtime  insulin lispro (HumaLOG) corrective regimen sliding scale   SubCutaneous Before meals and at bedtime  insulin lispro Injectable (HumaLOG) 4 Unit(s) SubCutaneous three times a day before meals  levothyroxine 50 MICROGram(s) Oral daily  pantoprazole    Tablet 40 milliGRAM(s) Oral every 12 hours  rifAXIMin 550 milliGRAM(s) Oral two times a day  sodium bicarbonate 650 milliGRAM(s) Oral three times a day    MEDICATIONS  (PRN):  acetaminophen   Tablet .. 500 milliGRAM(s) Oral every 6 hours PRN Temp greater or equal to 38C (100.4F), Mild Pain (1 - 3)  dextrose 40% Gel 15 Gram(s) Oral once PRN Blood Glucose LESS THAN 70 milliGRAM(s)/deciliter  glucagon  Injectable 1 milliGRAM(s) IntraMuscular once PRN Glucose LESS THAN 70 milligrams/deciliter  ondansetron Injectable 2 milliGRAM(s) IV Push once PRN Nausea  traZODone 50 milliGRAM(s) Oral at bedtime PRN Sleep      PHYSICAL EXAM  Vital Signs Last 24 Hrs  T(C): 36.9 (27 Apr 2020 13:32), Max: 37 (27 Apr 2020 05:43)  T(F): 98.5 (27 Apr 2020 13:32), Max: 98.6 (27 Apr 2020 05:43)  HR: 98 (27 Apr 2020 14:00) (74 - 98)  BP: 159/69 (27 Apr 2020 14:00) (156/67 - 167/73)  BP(mean): --  RR: 18 (27 Apr 2020 13:32) (18 - 18)  SpO2: 95% (27 Apr 2020 14:00) (94% - 95%)    Due to the nature of this patient’s COVID-19 isolation status (either confirmed or suspected), this note was prepared without a bedside physical examination to prevent spread of infection and to conserve personal protective equipment during this nationwide pandemic. If possible, direct patient communication occurred via electronic measures or telephone conversation. Examination highlights were provided by a bedside nurse wearing personal protective equipment and review of pertinent medical records. Objective data (vital signs, urine output, lab results, imaging studies, medications, etc) were reviewed in detail. Face to face visits and physical examination have been limited only to patients for whom it is required for medical decision making.    LABS:                        9.3    13.28 )-----------( 299      ( 27 Apr 2020 09:33 )             28.6     04-27    138  |  100  |  33<H>  ----------------------------<  135<H>  4.5   |  23  |  3.64<H>    Ca    8.9      27 Apr 2020 09:28  Phos  3.2     04-27  Mg     1.8     04-27    TPro  7.8  /  Alb  3.6  /  TBili  0.3  /  DBili  x   /  AST  22  /  ALT  9<L>  /  AlkPhos  121<H>  04-27        Thyroid Stimulating Hormone, Serum: 2.813 uIU/mL (04-14 @ 07:28)  Thyroid Stimulating Hormone, Serum: 2.877 uIU/mL (04-14 @ 00:34)      HbA1C: 6.9 % (04-05 @ 06:59)  7.6 % (03-31 @ 05:58)    CAPILLARY BLOOD GLUCOSE      POCT Blood Glucose.: 150 mg/dL (27 Apr 2020 16:35)  POCT Blood Glucose.: 194 mg/dL (27 Apr 2020 11:45)  POCT Blood Glucose.: 139 mg/dL (27 Apr 2020 08:23)      Insulin Sliding Scale requirements X 24 Hours:    RADIOLOGY & ADDITIONAL TESTS:    A/P 60yMale with hx of DM now with COVID, worsening renal function, moderate hyperglycemia.     1.  DM: type 2,   Please continue Lantus 8 units at bedtime  PLease continue Lispro 4 units tid with meals.   Continue lispro moderate dose scale with meals and at bedtime.   Continue consistent carb diet  FSG Goal 100-180    2.  Hyperlipidemia - goal LDL < 70  - on lipitor 10 mg once daily    3.  Obesity - outpatient medical management.      4.  Hypothyroidism - continue levothyroxine 50mcg once daily    Patient can followup with  after discharge  Case seen and discussed with  and updated the primary team.

## 2020-04-27 NOTE — PROGRESS NOTE ADULT - ASSESSMENT
59 y/o M with PMH of HTN, DM complicated by neuropathy, IVDU, ETOH liver cirrhosis, who presents to [facility] for fall at home; found to have toxic megacolon and pneumatosis; underwent ex lap and subtotal colectomy, and ileostomy on 3/31. Developed COVID 19 while in the hospital, but subsequent test was positive, patient was transferred to tele COVID unit without need for supplemental oxygen. Now on RA, on wet to dry dressing, walked with PT, MEG improving, stable for dispo to [facility] when set up with [facility].

## 2020-04-27 NOTE — BEHAVIORAL HEALTH ASSESSMENT NOTE - NSBHREFERDETAILS_PSY_A_CORE_FT
59 y/o Afghan man COVID+ c/o vague suicidal ideations. Question for dx and indication for psych treatment?

## 2020-04-27 NOTE — PROGRESS NOTE ADULT - PROBLEM SELECTOR PLAN 8
- restart home half dose trazodone 50mg qhs prn (100mg home dose)    #suicidal ideation   Pt endorsing SI without a plan.   - f/u psychiatry consult

## 2020-04-27 NOTE — PROGRESS NOTE ADULT - SUBJECTIVE AND OBJECTIVE BOX
OVERNIGHT EVENTS: NAEO    SUBJECTIVE / INTERVAL HPI: Patient seen and examined at bedside. Reports that he is agitated about him not getting his water; pt's mood improved after water delivered. When asked how he is doing, reports that he is "still alive." Pt agitated about social situation.    VITAL SIGNS:  Vital Signs Last 24 Hrs  T(C): 37 (27 Apr 2020 05:43), Max: 37 (27 Apr 2020 05:43)  T(F): 98.6 (27 Apr 2020 05:43), Max: 98.6 (27 Apr 2020 05:43)  HR: 77 (27 Apr 2020 05:43) (77 - 79)  BP: 167/73 (27 Apr 2020 05:43) (142/55 - 167/73)  BP(mean): --  RR: 18 (27 Apr 2020 05:43) (18 - 18)  SpO2: 95% (27 Apr 2020 05:43) (94% - 98%)    PHYSICAL EXAM:    General: middle aged male in no acute distress, grouchy demeanor, breathing comfortably on RA  HEENT: NC/AT; PERRL, anicteric sclera; MMM  Respiratory: no increased work of breathing with no accessory muscle use; on RA  Gastrointestinal: distended, mildly firm, mildly tender diffusely; with dressing over wound and ostomy bag in place draining brown stool   Extremities: WWP; no edema, clubbing or cyanosis  Neurological: AAOx3; no focal deficits    MEDICATIONS:  MEDICATIONS  (STANDING):  amLODIPine   Tablet 10 milliGRAM(s) Oral daily  atorvastatin 10 milliGRAM(s) Oral at bedtime  chlorhexidine 2% Cloths 1 Application(s) Topical daily  dextrose 5%. 1000 milliLiter(s) (50 mL/Hr) IV Continuous <Continuous>  dextrose 50% Injectable 12.5 Gram(s) IV Push once  dextrose 50% Injectable 25 Gram(s) IV Push once  dextrose 50% Injectable 25 Gram(s) IV Push once  diphenoxylate/atropine 1 Tablet(s) Oral every 12 hours  gabapentin 200 milliGRAM(s) Oral daily  heparin   Injectable 7500 Unit(s) SubCutaneous every 8 hours  insulin glargine Injectable (LANTUS) 8 Unit(s) SubCutaneous at bedtime  insulin lispro (HumaLOG) corrective regimen sliding scale   SubCutaneous Before meals and at bedtime  insulin lispro Injectable (HumaLOG) 4 Unit(s) SubCutaneous three times a day before meals  levothyroxine 50 MICROGram(s) Oral daily  pantoprazole    Tablet 40 milliGRAM(s) Oral every 12 hours  rifAXIMin 550 milliGRAM(s) Oral two times a day  sodium bicarbonate 650 milliGRAM(s) Oral three times a day    MEDICATIONS  (PRN):  acetaminophen   Tablet .. 500 milliGRAM(s) Oral every 6 hours PRN Temp greater or equal to 38C (100.4F), Mild Pain (1 - 3)  dextrose 40% Gel 15 Gram(s) Oral once PRN Blood Glucose LESS THAN 70 milliGRAM(s)/deciliter  glucagon  Injectable 1 milliGRAM(s) IntraMuscular once PRN Glucose LESS THAN 70 milligrams/deciliter  ondansetron Injectable 2 milliGRAM(s) IV Push once PRN Nausea  traZODone 50 milliGRAM(s) Oral at bedtime PRN Sleep      ALLERGIES:  Allergies    No Known Allergies    Intolerances        LABS:                        9.3    13.28 )-----------( 299      ( 27 Apr 2020 09:33 )             28.6     04-27    138  |  100  |  33<H>  ----------------------------<  135<H>  4.5   |  23  |  3.64<H>    Ca    8.9      27 Apr 2020 09:28  Phos  3.2     04-27  Mg     1.8     04-27    TPro  7.8  /  Alb  3.6  /  TBili  0.3  /  DBili  x   /  AST  22  /  ALT  9<L>  /  AlkPhos  121<H>  04-27        CAPILLARY BLOOD GLUCOSE      POCT Blood Glucose.: 139 mg/dL (27 Apr 2020 08:23)      RADIOLOGY & ADDITIONAL TESTS: Reviewed.

## 2020-04-27 NOTE — BEHAVIORAL HEALTH ASSESSMENT NOTE - RISK ASSESSMENT
Low Acute Suicide Risk Pt has chronic homelessness, no family support, no financial support, and significant socio-economical stress

## 2020-04-27 NOTE — PROGRESS NOTE ADULT - PROBLEM SELECTOR PLAN 2
Decompensated cirrhosis likely 2/2 alcohol use, c/b ascites, hepatic encephalopathy on admission (resolved) sbp ruled out  - CT abd/pelvis with persistent large ascites and peritonitis; bilateral pleural effusions and pericardial effusion  - MELD-Na score 28  - s/p paracentesis on 4/10 with negative cytology but not sent for cell count/culture, however was on zosyn for 2 weeks so GI with no concerns for SBP   - repeat paracentesis 4/19, but unable to aspirate enough fluid?   - SAAG 1.4 consistent with portal hypertension as etiology of ascites  - continue rifaximin 550mg BID (changed from lactulose) for hepatic encephalopathy   - holding lactulose for high ostomy output  - unclear if pt with varices- pt refuses gi workup- monitoring hb and active T+S transfuse if needed  - Hgb slowly dropping, however ileostomy without melanotic stool; light brown soft stool  - pt refusing video capsule endoscopy this admission; explained risks of not undergoing procedure and pt understands and repeats back risks of refusing procedure.   - pt needs screening for HCC outpatient and follow up with hepatology outpatient

## 2020-04-27 NOTE — BEHAVIORAL HEALTH ASSESSMENT NOTE - OTHER
primary team need for immediate gratification not tested pt was in bed with strong accent unclear h/o substance use? IVDU?

## 2020-04-27 NOTE — BEHAVIORAL HEALTH ASSESSMENT NOTE - PROBLEM SELECTOR PLAN 1
-Currently taking Trazodone 50mg for insomnia  -No current indication for additional antidepressant rx, given absence of depressive mood and likely explanation of acute social/medical stressors  -No acute indication for psychiatric hospitalization, given that his risk factors are chronic in nature and an inpatient would be unlikely to mitigate those at this point  -Consider outpatient psychotherapy and intensive social work to connect him with economic/housing support and a clubhouse  -Of note, if substance use is confirmed, pt might benefit of motivational interviewing and CBT in the outpatient setting

## 2020-04-27 NOTE — BEHAVIORAL HEALTH ASSESSMENT NOTE - DETAILS
none reported. Pt admits to feel like "he was going to die due to COVID, but other people have it and have been through"

## 2020-04-27 NOTE — BEHAVIORAL HEALTH ASSESSMENT NOTE - SUMMARY
61 y/o [place] male PMH of HTN, DM complicated by neuropathy, and reportedly IVDU and EtOH-induced liver cirrhosis. As per chart review, pt initially presented to [facility] for a fall at home; found to have toxic megacolon and pneumatosis; underwent ex lap and subtotal colectomy, and ileostomy on 3/31. Developed COVID 19 while in the hospital, but subsequent test was positive, patient was transferred to tele COVID unit without need for supplemental oxygen. Now on RA, on wet to dry dressing, walked with PT, MEG improving, stable for [facility] to jail when set up with SW.    Psych consult called for suicidal ideation as discharge planning was being organized. Pt did not seem to be suicidal through our clinical interview and was actively pursuing support systems to overcome the significant financial, cultural, and other social barriers that he is exposed to at this point. He did no present signs and sx consistent with MDD or generalized anxiety d/o, although adjustment d/o with anxious mood may be considered, given the multitude of medical comorbidities and COVID+ that he is currently presenting.    Pt confessed to be "jumping from one hospital to another" and reported to be primarily treated at [facility], although he "could not go there now because he cannot walk".    Pt does present some evidence suggestive of mild cognitive impairment, given his difficulties to coordinate ADLs and structure his life. Assuming that he has chronic EtOH use plus the fact that he has severe liver impairment, it is reasonable to expect that these mild cognitive findings should be present. These are, however, chronic issues that warrant outpatient investigation in a more structured environment and after he is medically stable. Of note, pt's presentation is mostly understood and explained by social stressors, and he would benefit from having a SW team assisting him to apply for benefits and housing resources. Pt manifested interest in f/u with [facility] as an outpatient to get resources "and help him survive [facility]".

## 2020-04-27 NOTE — BEHAVIORAL HEALTH ASSESSMENT NOTE - DIFFERENTIAL
-Adjustment d/o with anxious mood  -r/o mild cognitive impairment (2/2 EtOH use and/or liver impairment)

## 2020-04-27 NOTE — BEHAVIORAL HEALTH ASSESSMENT NOTE - HPI (INCLUDE ILLNESS QUALITY, SEVERITY, DURATION, TIMING, CONTEXT, MODIFYING FACTORS, ASSOCIATED SIGNS AND SYMPTOMS)
Telepsychiatry consult done in the presence of ARIES Page and the patient in a COVID+ nursing bolden. Pt consented to this format of interview and had no objections. As we approached him, he was lying in his bed watching the ceiling. I introduced myself and asked how his life was, overall. He told me that he felt overwhelmed, given that he is homeless and has no family here in [place]. He was just diagnosed with COVID and thought this could be the end. Upon further questioning, he confessed that he is actually in need of help, be it financial, housing, or some other programs, and that he used to be treated at [facility]. Despite multiple attempts on my end, he would consistently deny any needs for previous psychiatric treatment, including for substance use. He denied any EtOH use, cannabis, Heroin, or other drug use. 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 He also asked me if I had $20 out of my pocket to help him get started out upon discharge. When questioned whether he was feeling depressed or not, he told me life is tough since he came to [place] in the 1990's, and that God helps him to go through his days. He admits to be worried about what is going to happen but has a strong fawad that he "will go through whatever happens". He did not seem to be experiencing any AVH, paranoia, or to display suicidal/disorganized behaviors/thoughts through the interview. Upon finalizing our appointment, he thanked me for listening to him and wished me luck, as "you are still a young man and can earn your life".

## 2020-04-27 NOTE — BEHAVIORAL HEALTH ASSESSMENT NOTE - PROBLEM SELECTOR PLAN 2
-May be 2/2 recent delirium and ongoing COVID/inflammation  -Advised to monitor longitudinally. Pt prefers to be treated at [facility], his primary hospital - may consider coming to their walk-in clinic

## 2020-04-27 NOTE — BEHAVIORAL HEALTH ASSESSMENT NOTE - SUICIDE PROTECTIVE FACTORS
Jain beliefs/Identifies reasons for living/Has future plans/Ability to cope with stress/Cultural, spiritual and/or moral attitudes against suicide

## 2020-04-27 NOTE — PROGRESS NOTE ADULT - PROBLEM SELECTOR PLAN 6
Type 2 DM, endocrinology following   - continue lantus 8U qhs   - continue lispro 4U TID premeals  - moderate SSI   - FSG goal 140-180    #hypothyroidism   - TSH 2.813; TPO and antithyroid Abs neg   - continue levothyroxine 50mcg   - appreciate endocrinology recs    #neuropathy   - per pt, pt takes gabapentin 1200mg TID; would like to restart (not getting pain meds is part of reason he is having SI). explained that due to renal failure, we were holding   - CrCl 25-30; restarted gabapentin 200mg daily 4/26

## 2020-04-27 NOTE — ADVANCED PRACTICE NURSE CONSULT - ASSESSMENT
Assisted patient to toilet where he emptied ostomy appliance with verbal prompting and some physical assistance. Effluent thick, light brown, stoma flush to skin with mucocutaneous separation from 6-9 o'clock. Due to pt's belly girth, he was unable to watch appliance being changed while he was lying in bed. Will continue to try to educate on ostomy care. Spoke with him about frequency of emptying appliance and took pictures of each step of changing appliance with patient's camera.

## 2020-04-27 NOTE — PROGRESS NOTE ADULT - PROBLEM SELECTOR PLAN 3
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   - s/p laparotomy with wound vac in place (gen surg following)   - ileostomy in place draining brown stool with no signs of bleeding  - general surgery following, appreciate recs   - gen surg recommending lomotil- low dose to prevent constipation/ hepatic encephalopthy  - high ileostomy output overnight, will give back 500cc bolus today  - has wet to dry dressing on surgical wound.    #s/p wound vac  - would care NP following and continue teaching to patient on wound vac care and instructions  - now has wet to dry dressing, surgery following

## 2020-04-27 NOTE — PROGRESS NOTE ADULT - ATTENDING COMMENTS
agree with above.  pt with stable PO intake  insulin administration reviewed  can continue lantus 8 units at bedtime, lispro 4 units tid with meals  continue levothyroxine, lipitor  will follow

## 2020-04-27 NOTE — PROGRESS NOTE ADULT - ASSESSMENT
61 y/o man with MEG in setting of bowel resection and ileostomy for toxic megacolon and Etohic cirrhosis; BUN/creat down trending  h/o HTN, DM complicated by neuropathy, IVDU, ETOH liver cirrhosis, who presents to [facility] for fall at home. At [facility] patient reported that he takes lactulose at home daily and has daily BM, but he had no BM for 5 days prior to admission. 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 Patient underwent ex lap and subtotal colectomy, and ileostomy on 3/31.  COVID +     -MEG (acute kidney injury)   likely ATN with component of both pre renal from copious GI output and urine retention  creat down to 3.6 BUN 33  continue with po sodium bicarbonate 650 mg tid  renal diet       -metabolic acidosis  controlled with bicarb tabs  c/w present dosage of NaHCO3 650 mg TID    -Hyponatremia   resolved  pt does not need IVF   will continue monitoring     HTN  controlled  on amlodipine 10 mg po daily

## 2020-04-27 NOTE — PROGRESS NOTE ADULT - SUBJECTIVE AND OBJECTIVE BOX
Patient seen and reviewed wit med team  still with periods of agitation  pain controlled  surgical wound improving  urine output good  creat dropping      T(C): , Max: 37 (04-27-20 @ 05:43)  T(F): , Max: 98.6 (04-27-20 @ 05:43)  HR: 98 (04-27-20 @ 14:00)  BP: 159/69 (04-27-20 @ 14:00)  BP(mean): --  RR: 18 (04-27-20 @ 13:32)  SpO2: 95% (04-27-20 @ 14:00)  Wt(kg): --    04-26 @ 07:01  -  04-27 @ 07:00  --------------------------------------------------------  IN:  Total IN: 0 mL    OUT:    Ileostomy: 350 mL  Total OUT: 350 mL    Total NET: -350 mL      04-27 @ 07:01  -  04-27 @ 17:52  --------------------------------------------------------  IN:  Total IN: 0 mL    OUT:    Voided: 230 mL  Total OUT: 230 mL    Total NET: -230 mL            amLODIPine   Tablet 10 daily  atorvastatin 10 at bedtime  chlorhexidine 2% Cloths 1 daily  dextrose 5%. 1000 <Continuous>  dextrose 50% Injectable 12.5 once  dextrose 50% Injectable 25 once  dextrose 50% Injectable 25 once  diphenoxylate/atropine 1 every 12 hours  gabapentin 200 daily  heparin   Injectable 7500 every 8 hours  insulin glargine Injectable (LANTUS) 8 at bedtime  insulin lispro (HumaLOG) corrective regimen sliding scale  Before meals and at bedtime  insulin lispro Injectable (HumaLOG) 4 three times a day before meals  levothyroxine 50 daily  pantoprazole    Tablet 40 every 12 hours  rifAXIMin 550 two times a day  sodium bicarbonate 650 three times a day    Allergies    No Known Allergies      PHYSICAL EXAM:     LABS:                        9.3    13.28 )-----------( 299      ( 27 Apr 2020 09:33 )             28.6     04-27    138  |  100  |  33<H>  ----------------------------<  135<H>  4.5   |  23  |  3.64<H>    Ca    8.9      27 Apr 2020 09:28  Phos  3.2     04-27  Mg     1.8     04-27    TPro  7.8  /  Alb  3.6  /  TBili  0.3  /  DBili  x   /  AST  22  /  ALT  9<L>  /  AlkPhos  121<H>  04-27                RADIOLOGY & ADDITIONAL STUDIES:

## 2020-04-28 LAB
ALBUMIN SERPL ELPH-MCNC: 2.9 G/DL — LOW (ref 3.3–5)
ALP SERPL-CCNC: 95 U/L — SIGNIFICANT CHANGE UP (ref 40–120)
ALT FLD-CCNC: 9 U/L — LOW (ref 10–45)
ANION GAP SERPL CALC-SCNC: 14 MMOL/L — SIGNIFICANT CHANGE UP (ref 5–17)
AST SERPL-CCNC: 19 U/L — SIGNIFICANT CHANGE UP (ref 10–40)
BASOPHILS # BLD AUTO: 0.01 K/UL — SIGNIFICANT CHANGE UP (ref 0–0.2)
BASOPHILS NFR BLD AUTO: 0.1 % — SIGNIFICANT CHANGE UP (ref 0–2)
BILIRUB SERPL-MCNC: 0.2 MG/DL — SIGNIFICANT CHANGE UP (ref 0.2–1.2)
BLD GP AB SCN SERPL QL: NEGATIVE — SIGNIFICANT CHANGE UP
BUN SERPL-MCNC: 31 MG/DL — HIGH (ref 7–23)
CALCIUM SERPL-MCNC: 8.7 MG/DL — SIGNIFICANT CHANGE UP (ref 8.4–10.5)
CHLORIDE SERPL-SCNC: 100 MMOL/L — SIGNIFICANT CHANGE UP (ref 96–108)
CO2 SERPL-SCNC: 21 MMOL/L — LOW (ref 22–31)
CREAT SERPL-MCNC: 3.35 MG/DL — HIGH (ref 0.5–1.3)
CRP SERPL-MCNC: 11.02 MG/DL — HIGH (ref 0–0.4)
D DIMER BLD IA.RAPID-MCNC: 2375 NG/ML DDU — HIGH
DAT POLY-SP REAG RBC QL: NEGATIVE — SIGNIFICANT CHANGE UP
EOSINOPHIL # BLD AUTO: 0.23 K/UL — SIGNIFICANT CHANGE UP (ref 0–0.5)
EOSINOPHIL NFR BLD AUTO: 2 % — SIGNIFICANT CHANGE UP (ref 0–6)
FERRITIN SERPL-MCNC: 846 NG/ML — HIGH (ref 30–400)
FERRITIN SERPL-MCNC: 887 NG/ML — HIGH (ref 30–400)
GLUCOSE BLDC GLUCOMTR-MCNC: 160 MG/DL — HIGH (ref 70–99)
GLUCOSE BLDC GLUCOMTR-MCNC: 173 MG/DL — HIGH (ref 70–99)
GLUCOSE BLDC GLUCOMTR-MCNC: 179 MG/DL — HIGH (ref 70–99)
GLUCOSE BLDC GLUCOMTR-MCNC: 338 MG/DL — HIGH (ref 70–99)
GLUCOSE BLDC GLUCOMTR-MCNC: 85 MG/DL — SIGNIFICANT CHANGE UP (ref 70–99)
GLUCOSE SERPL-MCNC: 83 MG/DL — SIGNIFICANT CHANGE UP (ref 70–99)
HAPTOGLOB SERPL-MCNC: 385 MG/DL — HIGH (ref 34–200)
HCT VFR BLD CALC: 21.4 % — LOW (ref 39–50)
HCT VFR BLD CALC: 24.2 % — LOW (ref 39–50)
HGB BLD-MCNC: 7.1 G/DL — LOW (ref 13–17)
HGB BLD-MCNC: 7.7 G/DL — LOW (ref 13–17)
IMM GRANULOCYTES NFR BLD AUTO: 0.9 % — SIGNIFICANT CHANGE UP (ref 0–1.5)
IRON SATN MFR SERPL: 13 % — LOW (ref 16–55)
IRON SATN MFR SERPL: 20 UG/DL — LOW (ref 45–165)
LDH SERPL L TO P-CCNC: 321 U/L — HIGH (ref 50–242)
LYMPHOCYTES # BLD AUTO: 0.67 K/UL — LOW (ref 1–3.3)
LYMPHOCYTES # BLD AUTO: 5.9 % — LOW (ref 13–44)
MAGNESIUM SERPL-MCNC: 1.6 MG/DL — SIGNIFICANT CHANGE UP (ref 1.6–2.6)
MCHC RBC-ENTMCNC: 28.4 PG — SIGNIFICANT CHANGE UP (ref 27–34)
MCHC RBC-ENTMCNC: 29.7 PG — SIGNIFICANT CHANGE UP (ref 27–34)
MCHC RBC-ENTMCNC: 31.8 GM/DL — LOW (ref 32–36)
MCHC RBC-ENTMCNC: 33.2 GM/DL — SIGNIFICANT CHANGE UP (ref 32–36)
MCV RBC AUTO: 89.3 FL — SIGNIFICANT CHANGE UP (ref 80–100)
MCV RBC AUTO: 89.5 FL — SIGNIFICANT CHANGE UP (ref 80–100)
MONOCYTES # BLD AUTO: 0.97 K/UL — HIGH (ref 0–0.9)
MONOCYTES NFR BLD AUTO: 8.5 % — SIGNIFICANT CHANGE UP (ref 2–14)
NEUTROPHILS # BLD AUTO: 9.44 K/UL — HIGH (ref 1.8–7.4)
NEUTROPHILS NFR BLD AUTO: 82.6 % — HIGH (ref 43–77)
NRBC # BLD: 0 /100 WBCS — SIGNIFICANT CHANGE UP (ref 0–0)
NRBC # BLD: 0 /100 WBCS — SIGNIFICANT CHANGE UP (ref 0–0)
PHOSPHATE SERPL-MCNC: 3.5 MG/DL — SIGNIFICANT CHANGE UP (ref 2.5–4.5)
PLATELET # BLD AUTO: 230 K/UL — SIGNIFICANT CHANGE UP (ref 150–400)
PLATELET # BLD AUTO: 258 K/UL — SIGNIFICANT CHANGE UP (ref 150–400)
POTASSIUM SERPL-MCNC: 4.2 MMOL/L — SIGNIFICANT CHANGE UP (ref 3.5–5.3)
POTASSIUM SERPL-SCNC: 4.2 MMOL/L — SIGNIFICANT CHANGE UP (ref 3.5–5.3)
PROT SERPL-MCNC: 6.8 G/DL — SIGNIFICANT CHANGE UP (ref 6–8.3)
RBC # BLD: 2.39 M/UL — LOW (ref 4.2–5.8)
RBC # BLD: 2.56 M/UL — LOW (ref 4.2–5.8)
RBC # BLD: 2.71 M/UL — LOW (ref 4.2–5.8)
RBC # FLD: 14.1 % — SIGNIFICANT CHANGE UP (ref 10.3–14.5)
RBC # FLD: 14.3 % — SIGNIFICANT CHANGE UP (ref 10.3–14.5)
RETICS #: 44.5 K/UL — SIGNIFICANT CHANGE UP (ref 25–125)
RETICS/RBC NFR: 1.7 % — SIGNIFICANT CHANGE UP (ref 0.5–2.5)
RH IG SCN BLD-IMP: POSITIVE — SIGNIFICANT CHANGE UP
SODIUM SERPL-SCNC: 135 MMOL/L — SIGNIFICANT CHANGE UP (ref 135–145)
TIBC SERPL-MCNC: 151 UG/DL — LOW (ref 220–430)
TRANSFERRIN SERPL-MCNC: 121 MG/DL — LOW (ref 200–360)
UIBC SERPL-MCNC: 131 UG/DL — SIGNIFICANT CHANGE UP (ref 110–370)
WBC # BLD: 11.42 K/UL — HIGH (ref 3.8–10.5)
WBC # BLD: 12.68 K/UL — HIGH (ref 3.8–10.5)
WBC # FLD AUTO: 11.42 K/UL — HIGH (ref 3.8–10.5)
WBC # FLD AUTO: 12.68 K/UL — HIGH (ref 3.8–10.5)

## 2020-04-28 PROCEDURE — 99232 SBSQ HOSP IP/OBS MODERATE 35: CPT

## 2020-04-28 RX ORDER — MAGNESIUM SULFATE 500 MG/ML
2 VIAL (ML) INJECTION ONCE
Refills: 0 | Status: COMPLETED | OUTPATIENT
Start: 2020-04-28 | End: 2020-04-28

## 2020-04-28 RX ADMIN — HEPARIN SODIUM 7500 UNIT(S): 5000 INJECTION INTRAVENOUS; SUBCUTANEOUS at 06:21

## 2020-04-28 RX ADMIN — Medication 500 MILLIGRAM(S): at 10:06

## 2020-04-28 RX ADMIN — ATORVASTATIN CALCIUM 10 MILLIGRAM(S): 80 TABLET, FILM COATED ORAL at 22:21

## 2020-04-28 RX ADMIN — Medication 4 UNIT(S): at 17:15

## 2020-04-28 RX ADMIN — HEPARIN SODIUM 7500 UNIT(S): 5000 INJECTION INTRAVENOUS; SUBCUTANEOUS at 22:22

## 2020-04-28 RX ADMIN — Medication 4 UNIT(S): at 11:56

## 2020-04-28 RX ADMIN — PANTOPRAZOLE SODIUM 40 MILLIGRAM(S): 20 TABLET, DELAYED RELEASE ORAL at 05:43

## 2020-04-28 RX ADMIN — Medication 2: at 17:16

## 2020-04-28 RX ADMIN — Medication 4 UNIT(S): at 08:36

## 2020-04-28 RX ADMIN — CHLORHEXIDINE GLUCONATE 1 APPLICATION(S): 213 SOLUTION TOPICAL at 11:01

## 2020-04-28 RX ADMIN — INSULIN GLARGINE 8 UNIT(S): 100 INJECTION, SOLUTION SUBCUTANEOUS at 22:22

## 2020-04-28 RX ADMIN — Medication 50 MILLIGRAM(S): at 00:00

## 2020-04-28 RX ADMIN — Medication 650 MILLIGRAM(S): at 22:23

## 2020-04-28 RX ADMIN — Medication 50 MICROGRAM(S): at 06:21

## 2020-04-28 RX ADMIN — Medication 500 MILLIGRAM(S): at 19:55

## 2020-04-28 RX ADMIN — Medication 8: at 22:22

## 2020-04-28 RX ADMIN — Medication 2: at 11:56

## 2020-04-28 RX ADMIN — AMLODIPINE BESYLATE 10 MILLIGRAM(S): 2.5 TABLET ORAL at 06:21

## 2020-04-28 RX ADMIN — Medication 650 MILLIGRAM(S): at 06:22

## 2020-04-28 RX ADMIN — Medication 1 TABLET(S): at 11:00

## 2020-04-28 RX ADMIN — GABAPENTIN 200 MILLIGRAM(S): 400 CAPSULE ORAL at 11:00

## 2020-04-28 RX ADMIN — HEPARIN SODIUM 7500 UNIT(S): 5000 INJECTION INTRAVENOUS; SUBCUTANEOUS at 13:15

## 2020-04-28 RX ADMIN — PANTOPRAZOLE SODIUM 40 MILLIGRAM(S): 20 TABLET, DELAYED RELEASE ORAL at 17:14

## 2020-04-28 RX ADMIN — Medication 650 MILLIGRAM(S): at 13:13

## 2020-04-28 RX ADMIN — Medication 50 GRAM(S): at 09:13

## 2020-04-28 NOTE — PROGRESS NOTE ADULT - SUBJECTIVE AND OBJECTIVE BOX
STATUS POST:      INTERVAL HPI/OVERNIGHT EVENTS:      SUBJECTIVE:     amLODIPine   Tablet 10 milliGRAM(s) Oral daily  heparin   Injectable 7500 Unit(s) SubCutaneous every 8 hours  rifAXIMin 550 milliGRAM(s) Oral two times a day      Vital Signs Last 24 Hrs  T(C): 36.4 (28 Apr 2020 05:37), Max: 37.1 (27 Apr 2020 21:21)  T(F): 97.6 (28 Apr 2020 05:37), Max: 98.8 (27 Apr 2020 21:21)  HR: 78 (28 Apr 2020 05:37) (74 - 98)  BP: 144/62 (28 Apr 2020 05:37) (144/62 - 166/76)  BP(mean): --  RR: 17 (28 Apr 2020 05:37) (17 - 20)  SpO2: 95% (28 Apr 2020 05:37) (94% - 96%)  I&O's Detail    27 Apr 2020 07:01  -  28 Apr 2020 07:00  --------------------------------------------------------  IN:    Oral Fluid: 480 mL  Total IN: 480 mL    OUT:    Ileostomy: 510 mL    Voided: 1115 mL  Total OUT: 1625 mL    Total NET: -1145 mL          Physical Exam:  General:   Pulmonary:   Cardiovascular:   Abdominal:   Extremities:   Neuro:   Pulses:         LABS:                        7.7    11.42 )-----------( 258      ( 28 Apr 2020 06:54 )             24.2     04-28    135  |  100  |  31<H>  ----------------------------<  83  4.2   |  21<L>  |  3.35<H>    Ca    8.7      28 Apr 2020 06:54  Phos  3.5     04-28  Mg     1.6     04-28    TPro  6.8  /  Alb  2.9<L>  /  TBili  0.2  /  DBili  x   /  AST  19  /  ALT  9<L>  /  AlkPhos  95  04-28          RADIOLOGY & ADDITIONAL STUDIES: STATUS POST: 3/31 Ex lap, Subtotal Colectomy     INTERVAL HPI/OVERNIGHT EVENTS: Psych eval-adjustment disorder w/ anxious mood, pending dispo      SUBJECTIVE: Examined at the bedside with chief resident. Denies abdominal pain, nausea, emesis. No acute complaints     amLODIPine   Tablet 10 milliGRAM(s) Oral daily  heparin   Injectable 7500 Unit(s) SubCutaneous every 8 hours  rifAXIMin 550 milliGRAM(s) Oral two times a day      Vital Signs Last 24 Hrs  T(C): 36.4 (28 Apr 2020 05:37), Max: 37.1 (27 Apr 2020 21:21)  T(F): 97.6 (28 Apr 2020 05:37), Max: 98.8 (27 Apr 2020 21:21)  HR: 78 (28 Apr 2020 05:37) (74 - 98)  BP: 144/62 (28 Apr 2020 05:37) (144/62 - 166/76)  BP(mean): --  RR: 17 (28 Apr 2020 05:37) (17 - 20)  SpO2: 95% (28 Apr 2020 05:37) (94% - 96%)  I&O's Detail    27 Apr 2020 07:01  -  28 Apr 2020 07:00  --------------------------------------------------------  IN:    Oral Fluid: 480 mL  Total IN: 480 mL    OUT:    Ileostomy: 510 mL    Voided: 1115 mL  Total OUT: 1625 mL    Total NET: -1145 mL          Physical Exam:  General: Resting comfortably in bed in no acute distress  Pulmonary: non labored breathing,   Cardiovascular: RRR  Abdominal: soft, chronically distended, midline wound with good granulation at superior portion of wound, wet ot dry dressing change, ostomy with liquid stool   Extremities: wwp, no edema           LABS:                        7.7    11.42 )-----------( 258      ( 28 Apr 2020 06:54 )             24.2     04-28    135  |  100  |  31<H>  ----------------------------<  83  4.2   |  21<L>  |  3.35<H>    Ca    8.7      28 Apr 2020 06:54  Phos  3.5     04-28  Mg     1.6     04-28    TPro  6.8  /  Alb  2.9<L>  /  TBili  0.2  /  DBili  x   /  AST  19  /  ALT  9<L>  /  AlkPhos  95  04-28          RADIOLOGY & ADDITIONAL STUDIES:

## 2020-04-28 NOTE — PROGRESS NOTE ADULT - ASSESSMENT
61 y/o M with PMH of HTN, DM complicated by neuropathy, IVDU, ETOH liver cirrhosis, who presents to [facility] for fall at home; found to have toxic megacolon and pneumatosis; underwent ex lap and subtotal colectomy, and ileostomy on 3/31. Developed COVID 19 while in the hospital, but subsequent test was positive, patient was transferred to tele COVID unit without need for supplemental oxygen. Now on RA, on wet to dry dressing, walked with PT, MEG improving, stable for dispo to correction when set up with [facility].

## 2020-04-28 NOTE — PROGRESS NOTE ADULT - ASSESSMENT
61 y/o man with MEG in setting of bowel resection and ileostomy for toxic megacolon and Etohic cirrhosis; BUN/creat down trending  h/o HTN, DM complicated by neuropathy, IVDU, ETOH liver cirrhosis, who presents to [facility] for fall at home. At [facility] patient reported that he takes lactulose at home daily and has daily BM, but he had no BM for 5 days prior to admission. 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 Patient underwent ex lap and subtotal colectomy, and ileostomy on 3/31.  COVID +    # MEG (acute kidney injury)  - likely ATN with component of both pre-renal from copious GI output and urine retention  - creat down to 3.3 BUN 31  - renal diet   - strict I/O  - Avoid ACE/ARB/NSAIDs      # metabolic acidosis  - controlled with bicarb tabs  - c/w present dosage of NaHCO3 650 mg TID    # Hyponatremia   - improved     # HTN  - controlled  - on amlodipine 10 mg po daily

## 2020-04-28 NOTE — PROGRESS NOTE ADULT - SUBJECTIVE AND OBJECTIVE BOX
OVERNIGHT EVENTS: NAEO    SUBJECTIVE / INTERVAL HPI: Patient seen and examined at bedside. Seen by psychiatry yesterday, cleared for discharge and not recommending inpatient hospitalization or additional psychiatric medication, likely adjustment disorder with depressive mood. Pt reports that he has no complaints today, feels well.     VITAL SIGNS:  Vital Signs Last 24 Hrs  T(C): 36.4 (28 Apr 2020 05:37), Max: 37.1 (27 Apr 2020 21:21)  T(F): 97.6 (28 Apr 2020 05:37), Max: 98.8 (27 Apr 2020 21:21)  HR: 78 (28 Apr 2020 05:37) (74 - 98)  BP: 144/62 (28 Apr 2020 05:37) (144/62 - 166/76)  BP(mean): --  RR: 17 (28 Apr 2020 05:37) (17 - 20)  SpO2: 95% (28 Apr 2020 05:37) (94% - 96%)    PHYSICAL EXAM:    General: middle aged male in no acute distress,  breathing comfortably on RA, lying in bed flat  HEENT: NC/AT; PERRL, anicteric sclera; MMM  Respiratory: no increased work of breathing with no accessory muscle use; on RA  Gastrointestinal: distended, mildly firm, mildly tender diffusely; with dressing over wound and ostomy bag in place draining brown stool   Extremities: WWP; no edema, clubbing or cyanosis  Neurological: AAOx3; no focal deficits    MEDICATIONS:  MEDICATIONS  (STANDING):  amLODIPine   Tablet 10 milliGRAM(s) Oral daily  atorvastatin 10 milliGRAM(s) Oral at bedtime  chlorhexidine 2% Cloths 1 Application(s) Topical daily  dextrose 5%. 1000 milliLiter(s) (50 mL/Hr) IV Continuous <Continuous>  dextrose 50% Injectable 12.5 Gram(s) IV Push once  dextrose 50% Injectable 25 Gram(s) IV Push once  dextrose 50% Injectable 25 Gram(s) IV Push once  diphenoxylate/atropine 1 Tablet(s) Oral daily  gabapentin 200 milliGRAM(s) Oral daily  heparin   Injectable 7500 Unit(s) SubCutaneous every 8 hours  insulin glargine Injectable (LANTUS) 8 Unit(s) SubCutaneous at bedtime  insulin lispro (HumaLOG) corrective regimen sliding scale   SubCutaneous Before meals and at bedtime  insulin lispro Injectable (HumaLOG) 4 Unit(s) SubCutaneous three times a day before meals  levothyroxine 50 MICROGram(s) Oral daily  pantoprazole    Tablet 40 milliGRAM(s) Oral every 12 hours  rifAXIMin 550 milliGRAM(s) Oral two times a day  sodium bicarbonate 650 milliGRAM(s) Oral three times a day    MEDICATIONS  (PRN):  acetaminophen   Tablet .. 500 milliGRAM(s) Oral every 6 hours PRN Temp greater or equal to 38C (100.4F), Mild Pain (1 - 3)  dextrose 40% Gel 15 Gram(s) Oral once PRN Blood Glucose LESS THAN 70 milliGRAM(s)/deciliter  glucagon  Injectable 1 milliGRAM(s) IntraMuscular once PRN Glucose LESS THAN 70 milligrams/deciliter  traZODone 50 milliGRAM(s) Oral at bedtime PRN Sleep      ALLERGIES:  Allergies    No Known Allergies    Intolerances        LABS:                        7.7    11.42 )-----------( 258      ( 28 Apr 2020 06:54 )             24.2     04-28    135  |  100  |  31<H>  ----------------------------<  83  4.2   |  21<L>  |  3.35<H>    Ca    8.7      28 Apr 2020 06:54  Phos  3.5     04-28  Mg     1.6     04-28    TPro  6.8  /  Alb  2.9<L>  /  TBili  0.2  /  DBili  x   /  AST  19  /  ALT  9<L>  /  AlkPhos  95  04-28        CAPILLARY BLOOD GLUCOSE      POCT Blood Glucose.: 85 mg/dL (28 Apr 2020 07:56)      RADIOLOGY & ADDITIONAL TESTS: Reviewed.

## 2020-04-28 NOTE — PROGRESS NOTE ADULT - ATTENDING COMMENTS
Agree with above.   pt sp PRBC transfusion today  planned for DC soon  insulin administration reviewed  can continue 8 units lantus at bedtime, lispro 4 units tid with meals  repeat TFTs  on discharge, will likely be able to use oral DM agents instead of insulin  will follow

## 2020-04-28 NOTE — PROGRESS NOTE ADULT - PROBLEM SELECTOR PLAN 3
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   - s/p laparotomy with wound vac in place (gen surg following)   - ileostomy in place draining brown stool with no signs of bleeding  - general surgery following, appreciate recs   - gen surg recommending lomotil- low dose to prevent constipation/ hepatic encephalopthy  - if pt with high ileostomy output, consider giving back fluid  - has wet to dry dressing on surgical wound.    #s/p wound vac  - would care NP following and continue teaching to patient on wound vac care and instructions  - now has wet to dry dressing, surgery following

## 2020-04-28 NOTE — PROGRESS NOTE ADULT - PROBLEM SELECTOR PLAN 8
- restart home half dose trazodone 50mg qhs prn (100mg home dose)    #suicidal ideation   Pt endorsing SI without a plan.   - psychiatry consult cleared for discharge; no additional psychiatric medications, continue trazodone; no utility in inpatient psych, likely adjustment disorder with depressive mood related to social issues  - social work re: going back to shelter

## 2020-04-28 NOTE — PROGRESS NOTE ADULT - SUBJECTIVE AND OBJECTIVE BOX
INTERVAL HPI/OVERNIGHT EVENTS:    Patient is a 60y old  Male who presents with a chief complaint of Colectomy (19 Apr 2020 10:46)  Spoke to the patient via phone. Very frustrated at state of health and discharge to shelter. He does not have any diabetes supplies/insulin at home. He has a poor appetite.   Monitoring renal function  Patient was having some suicidal ideations - adjustment disorder with depressive mood per psych-- has cleared for discharge Denied any active thoughts today.  Saturating well on RA  FSg and insulin administration reviewed      Pt reports the following symptoms:    Pt reports the following symptoms:  CONSTITUTIONAL:  Negative chills  CARDIOVASCULAR:  Negative for chest pain or palpitations  RESPIRATORY:  Negative for cough, wheezing  GASTROINTESTINAL:  Negative for diarrhea, constipation  NEUROLOGIC:  No headache, confusion, dizziness, lightheadednes    MEDICATIONS  (STANDING):  amLODIPine   Tablet 10 milliGRAM(s) Oral daily  atorvastatin 10 milliGRAM(s) Oral at bedtime  chlorhexidine 2% Cloths 1 Application(s) Topical daily  dextrose 5%. 1000 milliLiter(s) (50 mL/Hr) IV Continuous <Continuous>  dextrose 50% Injectable 12.5 Gram(s) IV Push once  dextrose 50% Injectable 25 Gram(s) IV Push once  dextrose 50% Injectable 25 Gram(s) IV Push once  diphenoxylate/atropine 1 Tablet(s) Oral daily  gabapentin 200 milliGRAM(s) Oral daily  heparin   Injectable 7500 Unit(s) SubCutaneous every 8 hours  insulin glargine Injectable (LANTUS) 8 Unit(s) SubCutaneous at bedtime  insulin lispro (HumaLOG) corrective regimen sliding scale   SubCutaneous Before meals and at bedtime  insulin lispro Injectable (HumaLOG) 4 Unit(s) SubCutaneous three times a day before meals  levothyroxine 50 MICROGram(s) Oral daily  pantoprazole    Tablet 40 milliGRAM(s) Oral every 12 hours  rifAXIMin 550 milliGRAM(s) Oral two times a day  sodium bicarbonate 650 milliGRAM(s) Oral three times a day    MEDICATIONS  (PRN):  acetaminophen   Tablet .. 500 milliGRAM(s) Oral every 6 hours PRN Temp greater or equal to 38C (100.4F), Mild Pain (1 - 3)  dextrose 40% Gel 15 Gram(s) Oral once PRN Blood Glucose LESS THAN 70 milliGRAM(s)/deciliter  glucagon  Injectable 1 milliGRAM(s) IntraMuscular once PRN Glucose LESS THAN 70 milligrams/deciliter  traZODone 50 milliGRAM(s) Oral at bedtime PRN Sleep      PHYSICAL EXAM  Vital Signs Last 24 Hrs  T(C): 36.9 (28 Apr 2020 13:18), Max: 37.1 (27 Apr 2020 21:21)  T(F): 98.4 (28 Apr 2020 13:18), Max: 98.8 (27 Apr 2020 21:21)  HR: 80 (28 Apr 2020 13:18) (77 - 98)  BP: 140/74 (28 Apr 2020 13:18) (140/74 - 166/76)  BP(mean): --  RR: 18 (28 Apr 2020 13:18) (17 - 20)  SpO2: 96% (28 Apr 2020 13:18) (95% - 96%)    Due to the nature of this patient’s COVID-19 isolation status (either confirmed or suspected), this note was prepared without a bedside physical examination to prevent spread of infection and to conserve personal protective equipment during this nationwide pandemic. If possible, direct patient communication occurred via electronic measures or telephone conversation. Examination highlights were provided by a bedside nurse wearing personal protective equipment and review of pertinent medical records. Objective data (vital signs, urine output, lab results, imaging studies, medications, etc) were reviewed in detail. Face to face visits and physical examination have been limited only to patients for whom it is required for medical decision making.    LABS:                        7.1    12.68 )-----------( 230      ( 28 Apr 2020 12:31 )             21.4     04-28    135  |  100  |  31<H>  ----------------------------<  83  4.2   |  21<L>  |  3.35<H>    Ca    8.7      28 Apr 2020 06:54  Phos  3.5     04-28  Mg     1.6     04-28    TPro  6.8  /  Alb  2.9<L>  /  TBili  0.2  /  DBili  x   /  AST  19  /  ALT  9<L>  /  AlkPhos  95  04-28        Thyroid Stimulating Hormone, Serum: 2.813 uIU/mL (04-14 @ 07:28)  Thyroid Stimulating Hormone, Serum: 2.877 uIU/mL (04-14 @ 00:34)      HbA1C: 6.9 % (04-05 @ 06:59)  7.6 % (03-31 @ 05:58)    CAPILLARY BLOOD GLUCOSE      POCT Blood Glucose.: 160 mg/dL (28 Apr 2020 11:46)  POCT Blood Glucose.: 85 mg/dL (28 Apr 2020 07:56)  POCT Blood Glucose.: 162 mg/dL (27 Apr 2020 22:37)  POCT Blood Glucose.: 179 mg/dL (27 Apr 2020 21:49)  POCT Blood Glucose.: 150 mg/dL (27 Apr 2020 16:35)    Will discuss in Rounds  A/P 60yMale with hx of DM now with COVID, worsening renal function, moderate hyperglycemia.     1.  DM: type 2,   Please continue Lantus 8 units at bedtime  PLease continue Lispro 4 units tid with meals.   Continue lispro moderate dose scale with meals and at bedtime.   Continue consistent carb diet  FSG Goal 100-180    2.  Hyperlipidemia - goal LDL < 70  - on lipitor 10 mg once daily    3.  Obesity - outpatient medical management.      4.  Hypothyroidism - continue levothyroxine 50mcg once daily    Patient can followup with  after discharge  Case seen and discussed with  and updated the primary team. INTERVAL HPI/OVERNIGHT EVENTS:    Patient is a 60y old  Male who presents with a chief complaint of Colectomy (19 Apr 2020 10:46)  Spoke to the patient via phone. Very frustrated at state of health and discharge to shelter. He does not have any diabetes supplies/insulin at home. He has a poor appetite.   Monitoring renal function  Patient was having some suicidal ideations - adjustment disorder with depressive mood per psych-- has cleared for discharge Denied any active thoughts today.  Saturating well on RA  FSg and insulin administration reviewed      Pt reports the following symptoms:    Pt reports the following symptoms:  CONSTITUTIONAL:  Negative chills  CARDIOVASCULAR:  Negative for chest pain or palpitations  RESPIRATORY:  Negative for cough, wheezing  GASTROINTESTINAL:  Negative for diarrhea, constipation  NEUROLOGIC:  No headache, confusion, dizziness, lightheadednes    MEDICATIONS  (STANDING):  amLODIPine   Tablet 10 milliGRAM(s) Oral daily  atorvastatin 10 milliGRAM(s) Oral at bedtime  chlorhexidine 2% Cloths 1 Application(s) Topical daily  dextrose 5%. 1000 milliLiter(s) (50 mL/Hr) IV Continuous <Continuous>  dextrose 50% Injectable 12.5 Gram(s) IV Push once  dextrose 50% Injectable 25 Gram(s) IV Push once  dextrose 50% Injectable 25 Gram(s) IV Push once  diphenoxylate/atropine 1 Tablet(s) Oral daily  gabapentin 200 milliGRAM(s) Oral daily  heparin   Injectable 7500 Unit(s) SubCutaneous every 8 hours  insulin glargine Injectable (LANTUS) 8 Unit(s) SubCutaneous at bedtime  insulin lispro (HumaLOG) corrective regimen sliding scale   SubCutaneous Before meals and at bedtime  insulin lispro Injectable (HumaLOG) 4 Unit(s) SubCutaneous three times a day before meals  levothyroxine 50 MICROGram(s) Oral daily  pantoprazole    Tablet 40 milliGRAM(s) Oral every 12 hours  rifAXIMin 550 milliGRAM(s) Oral two times a day  sodium bicarbonate 650 milliGRAM(s) Oral three times a day    MEDICATIONS  (PRN):  acetaminophen   Tablet .. 500 milliGRAM(s) Oral every 6 hours PRN Temp greater or equal to 38C (100.4F), Mild Pain (1 - 3)  dextrose 40% Gel 15 Gram(s) Oral once PRN Blood Glucose LESS THAN 70 milliGRAM(s)/deciliter  glucagon  Injectable 1 milliGRAM(s) IntraMuscular once PRN Glucose LESS THAN 70 milligrams/deciliter  traZODone 50 milliGRAM(s) Oral at bedtime PRN Sleep      PHYSICAL EXAM  Vital Signs Last 24 Hrs  T(C): 36.9 (28 Apr 2020 13:18), Max: 37.1 (27 Apr 2020 21:21)  T(F): 98.4 (28 Apr 2020 13:18), Max: 98.8 (27 Apr 2020 21:21)  HR: 80 (28 Apr 2020 13:18) (77 - 98)  BP: 140/74 (28 Apr 2020 13:18) (140/74 - 166/76)  BP(mean): --  RR: 18 (28 Apr 2020 13:18) (17 - 20)  SpO2: 96% (28 Apr 2020 13:18) (95% - 96%)    Due to the nature of this patient’s COVID-19 isolation status (either confirmed or suspected), this note was prepared without a bedside physical examination to prevent spread of infection and to conserve personal protective equipment during this nationwide pandemic. If possible, direct patient communication occurred via electronic measures or telephone conversation. Examination highlights were provided by a bedside nurse wearing personal protective equipment and review of pertinent medical records. Objective data (vital signs, urine output, lab results, imaging studies, medications, etc) were reviewed in detail. Face to face visits and physical examination have been limited only to patients for whom it is required for medical decision making.    LABS:                        7.1    12.68 )-----------( 230      ( 28 Apr 2020 12:31 )             21.4     04-28    135  |  100  |  31<H>  ----------------------------<  83  4.2   |  21<L>  |  3.35<H>    Ca    8.7      28 Apr 2020 06:54  Phos  3.5     04-28  Mg     1.6     04-28    TPro  6.8  /  Alb  2.9<L>  /  TBili  0.2  /  DBili  x   /  AST  19  /  ALT  9<L>  /  AlkPhos  95  04-28        Thyroid Stimulating Hormone, Serum: 2.813 uIU/mL (04-14 @ 07:28)  Thyroid Stimulating Hormone, Serum: 2.877 uIU/mL (04-14 @ 00:34)      HbA1C: 6.9 % (04-05 @ 06:59)  7.6 % (03-31 @ 05:58)    CAPILLARY BLOOD GLUCOSE      POCT Blood Glucose.: 160 mg/dL (28 Apr 2020 11:46)  POCT Blood Glucose.: 85 mg/dL (28 Apr 2020 07:56)  POCT Blood Glucose.: 162 mg/dL (27 Apr 2020 22:37)  POCT Blood Glucose.: 179 mg/dL (27 Apr 2020 21:49)  POCT Blood Glucose.: 150 mg/dL (27 Apr 2020 16:35)      A/P 60yMale with hx of DM now with COVID, worsening renal function, moderate hyperglycemia.     1.  DM: type 2,   Please continue Lantus 8 units at bedtime  PLease continue Lispro 4 units tid with meals.   Continue lispro moderate dose scale with meals and at bedtime.   Continue consistent carb diet  FSG Goal 100-180    2.  Hyperlipidemia - goal LDL < 70  - on lipitor 10 mg once daily    3.  Obesity - outpatient medical management.      4.  Hypothyroidism - Stop levothyroxine 50mcg once daily. Recheck TFTs tomorrow AM    Discharge: No insulin. Start glimepiride 2mg or glipizide 4mg (whichever is most cost effective). Will provide glucometer. Will make f/u appt at [facility]     Case seen and discussed with  and updated the primary team.

## 2020-04-28 NOTE — PROGRESS NOTE ADULT - ASSESSMENT
59 y/o M with PMH of HTN, DM complicated by neuropathy, EtOH liver cirrhosis, presenting w/ pneumatosis of unknown etiology, s/p sub total colectomy and end ileostomy on 3/31, c/b fever and purulent wound infection (4/9) now s/p paracentesis 4/13 found to be COVID (+). Hgb in am 7.7 Clinically stable.    - f/o Ileostomy output  - c/w Lomotil  - AM Hgb 7.7, recommend repeat and transfuse accordingly  - care per primary team  - plan discussed with chief resident and attending 61 y/o M with PMH of HTN, DM complicated by neuropathy, EtOH liver cirrhosis, presenting w/ pneumatosis of unknown etiology, s/p sub total colectomy and end ileostomy on 3/31, c/b fever and purulent wound infection (4/9) now s/p paracentesis 4/13 found to be COVID (+). Hgb in am 7.7 from 9.3 day prior and 7.6 9/26, low suspicion for blood loss. Clinically stable.    - f/o Ileostomy output  - c/w Lomotil  - AM Hgb 7.7, recommend repeat in pm  - care per primary team  - plan discussed with chief resident and attending

## 2020-04-28 NOTE — PROGRESS NOTE ADULT - SUBJECTIVE AND OBJECTIVE BOX
Patient seen and examined at bedside as per COVID protocol.  Meds, vitals, labs, imaging reviewed.        Medications:    acetaminophen   Tablet .. 500 milliGRAM(s) every 6 hours PRN  amLODIPine   Tablet 10 milliGRAM(s) daily  atorvastatin 10 milliGRAM(s) at bedtime  chlorhexidine 2% Cloths 1 Application(s) daily  dextrose 40% Gel 15 Gram(s) once PRN  dextrose 5%. 1000 milliLiter(s) <Continuous>  dextrose 50% Injectable 12.5 Gram(s) once  dextrose 50% Injectable 25 Gram(s) once  dextrose 50% Injectable 25 Gram(s) once  diphenoxylate/atropine 1 Tablet(s) daily  gabapentin 200 milliGRAM(s) daily  glucagon  Injectable 1 milliGRAM(s) once PRN  heparin   Injectable 7500 Unit(s) every 8 hours  insulin glargine Injectable (LANTUS) 8 Unit(s) at bedtime  insulin lispro (HumaLOG) corrective regimen sliding scale   Before meals and at bedtime  insulin lispro Injectable (HumaLOG) 4 Unit(s) three times a day before meals  levothyroxine 50 MICROGram(s) daily  pantoprazole    Tablet 40 milliGRAM(s) every 12 hours  rifAXIMin 550 milliGRAM(s) two times a day  sodium bicarbonate 650 milliGRAM(s) three times a day  traZODone 50 milliGRAM(s) at bedtime PRN      Allergies    No Known Allergies    Intolerances        T(C): , Max: 37.1 (04-27-20 @ 21:21)  T(F): , Max: 98.8 (04-27-20 @ 21:21)  HR: 78 (04-28-20 @ 05:37)  BP: 144/62 (04-28-20 @ 05:37)  RR: 17 (04-28-20 @ 05:37)  SpO2: 95% (04-28-20 @ 05:37)  Wt(kg): --    04-27 @ 07:01  -  04-28 @ 07:00  --------------------------------------------------------  IN:    Oral Fluid: 480 mL  Total IN: 480 mL    OUT:    Ileostomy: 510 mL    Voided: 1115 mL  Total OUT: 1625 mL    Total NET: -1145 mL            ROS: Unable due to limited contact    Physical exam as per primary team due to COVID protocol  General: middle aged male in no acute distress,  breathing comfortably on RA, lying in bed flat  HEENT: NC/AT; PERRL, anicteric sclera; MMM  Respiratory: no increased work of breathing with no accessory muscle use; on RA  Gastrointestinal: distended, mildly firm, mildly tender diffusely; with dressing over wound and ostomy bag in place draining brown stool   Extremities: WWP; no edema, clubbing or cyanosis  Neurological: AAOx3; no focal deficits    LABS:                        7.1    12.68 )-----------( 230      ( 28 Apr 2020 12:31 )             21.4     04-28    135  |  100  |  31<H>  ----------------------------<  83  4.2   |  21<L>  |  3.35<H>    Ca    8.7      28 Apr 2020 06:54  Phos  3.5     04-28  Mg     1.6     04-28    TPro  6.8  /  Alb  2.9<L>  /  TBili  0.2  /  DBili  x   /  AST  19  /  ALT  9<L>  /  AlkPhos  95  04-28                RADIOLOGY & ADDITIONAL STUDIES:

## 2020-04-29 LAB
ALBUMIN SERPL ELPH-MCNC: 2.9 G/DL — LOW (ref 3.3–5)
ALP SERPL-CCNC: 102 U/L — SIGNIFICANT CHANGE UP (ref 40–120)
ALT FLD-CCNC: 10 U/L — SIGNIFICANT CHANGE UP (ref 10–45)
ANION GAP SERPL CALC-SCNC: 13 MMOL/L — SIGNIFICANT CHANGE UP (ref 5–17)
APTT BLD: 32 SEC — SIGNIFICANT CHANGE UP (ref 27.5–36.3)
AST SERPL-CCNC: 19 U/L — SIGNIFICANT CHANGE UP (ref 10–40)
BASOPHILS # BLD AUTO: 0.01 K/UL — SIGNIFICANT CHANGE UP (ref 0–0.2)
BASOPHILS NFR BLD AUTO: 0.1 % — SIGNIFICANT CHANGE UP (ref 0–2)
BILIRUB SERPL-MCNC: 0.2 MG/DL — SIGNIFICANT CHANGE UP (ref 0.2–1.2)
BLD GP AB SCN SERPL QL: NEGATIVE — SIGNIFICANT CHANGE UP
BUN SERPL-MCNC: 31 MG/DL — HIGH (ref 7–23)
CALCIUM SERPL-MCNC: 8.8 MG/DL — SIGNIFICANT CHANGE UP (ref 8.4–10.5)
CHLORIDE SERPL-SCNC: 101 MMOL/L — SIGNIFICANT CHANGE UP (ref 96–108)
CO2 SERPL-SCNC: 22 MMOL/L — SIGNIFICANT CHANGE UP (ref 22–31)
CREAT SERPL-MCNC: 3.24 MG/DL — HIGH (ref 0.5–1.3)
CRP SERPL-MCNC: 12.39 MG/DL — HIGH (ref 0–0.4)
CULTURE RESULTS: SIGNIFICANT CHANGE UP
D DIMER BLD IA.RAPID-MCNC: 2277 NG/ML DDU — HIGH
EOSINOPHIL # BLD AUTO: 0.26 K/UL — SIGNIFICANT CHANGE UP (ref 0–0.5)
EOSINOPHIL NFR BLD AUTO: 2.1 % — SIGNIFICANT CHANGE UP (ref 0–6)
FERRITIN SERPL-MCNC: 815 NG/ML — HIGH (ref 30–400)
FOLATE SERPL-MCNC: 5.9 NG/ML — SIGNIFICANT CHANGE UP
GLUCOSE BLDC GLUCOMTR-MCNC: 154 MG/DL — HIGH (ref 70–99)
GLUCOSE BLDC GLUCOMTR-MCNC: 191 MG/DL — HIGH (ref 70–99)
GLUCOSE BLDC GLUCOMTR-MCNC: 203 MG/DL — HIGH (ref 70–99)
GLUCOSE BLDC GLUCOMTR-MCNC: 92 MG/DL — SIGNIFICANT CHANGE UP (ref 70–99)
GLUCOSE SERPL-MCNC: 86 MG/DL — SIGNIFICANT CHANGE UP (ref 70–99)
HCT VFR BLD CALC: 22.6 % — LOW (ref 39–50)
HGB BLD-MCNC: 7.2 G/DL — LOW (ref 13–17)
IMM GRANULOCYTES NFR BLD AUTO: 0.7 % — SIGNIFICANT CHANGE UP (ref 0–1.5)
INR BLD: 1.25 — HIGH (ref 0.88–1.16)
KAPPA LC SER QL IFE: 13.88 MG/DL — HIGH (ref 0.33–1.94)
KAPPA/LAMBDA FREE LIGHT CHAIN RATIO, SERUM: 1.08 RATIO — SIGNIFICANT CHANGE UP (ref 0.26–1.65)
LAMBDA LC SER QL IFE: 12.8 MG/DL — HIGH (ref 0.57–2.63)
LYMPHOCYTES # BLD AUTO: 0.65 K/UL — LOW (ref 1–3.3)
LYMPHOCYTES # BLD AUTO: 5.4 % — LOW (ref 13–44)
MAGNESIUM SERPL-MCNC: 1.6 MG/DL — SIGNIFICANT CHANGE UP (ref 1.6–2.6)
MCHC RBC-ENTMCNC: 28.7 PG — SIGNIFICANT CHANGE UP (ref 27–34)
MCHC RBC-ENTMCNC: 31.9 GM/DL — LOW (ref 32–36)
MCV RBC AUTO: 90 FL — SIGNIFICANT CHANGE UP (ref 80–100)
MONOCYTES # BLD AUTO: 0.96 K/UL — HIGH (ref 0–0.9)
MONOCYTES NFR BLD AUTO: 7.9 % — SIGNIFICANT CHANGE UP (ref 2–14)
NEUTROPHILS # BLD AUTO: 10.13 K/UL — HIGH (ref 1.8–7.4)
NEUTROPHILS NFR BLD AUTO: 83.8 % — HIGH (ref 43–77)
NRBC # BLD: 0 /100 WBCS — SIGNIFICANT CHANGE UP (ref 0–0)
PHOSPHATE SERPL-MCNC: 4.2 MG/DL — SIGNIFICANT CHANGE UP (ref 2.5–4.5)
PLATELET # BLD AUTO: 244 K/UL — SIGNIFICANT CHANGE UP (ref 150–400)
POTASSIUM SERPL-MCNC: 4.4 MMOL/L — SIGNIFICANT CHANGE UP (ref 3.5–5.3)
POTASSIUM SERPL-SCNC: 4.4 MMOL/L — SIGNIFICANT CHANGE UP (ref 3.5–5.3)
PROT SERPL-MCNC: 6.9 G/DL — SIGNIFICANT CHANGE UP (ref 6–8.3)
PROTHROM AB SERPL-ACNC: 14.4 SEC — HIGH (ref 10–12.9)
RBC # BLD: 2.51 M/UL — LOW (ref 4.2–5.8)
RBC # FLD: 14.2 % — SIGNIFICANT CHANGE UP (ref 10.3–14.5)
RH IG SCN BLD-IMP: POSITIVE — SIGNIFICANT CHANGE UP
SODIUM SERPL-SCNC: 136 MMOL/L — SIGNIFICANT CHANGE UP (ref 135–145)
SPECIMEN SOURCE: SIGNIFICANT CHANGE UP
T4 FREE SERPL-MCNC: 1.03 NG/DL — SIGNIFICANT CHANGE UP (ref 0.7–1.48)
TSH SERPL-MCNC: 3.33 UIU/ML — SIGNIFICANT CHANGE UP (ref 0.35–4.94)
VIT B12 SERPL-MCNC: 542 PG/ML — SIGNIFICANT CHANGE UP (ref 232–1245)
WBC # BLD: 12.1 K/UL — HIGH (ref 3.8–10.5)
WBC # FLD AUTO: 12.1 K/UL — HIGH (ref 3.8–10.5)

## 2020-04-29 PROCEDURE — 99232 SBSQ HOSP IP/OBS MODERATE 35: CPT

## 2020-04-29 RX ORDER — INSULIN LISPRO 100/ML
VIAL (ML) SUBCUTANEOUS AT BEDTIME
Refills: 0 | Status: DISCONTINUED | OUTPATIENT
Start: 2020-04-29 | End: 2020-05-11

## 2020-04-29 RX ORDER — INSULIN LISPRO 100/ML
VIAL (ML) SUBCUTANEOUS
Refills: 0 | Status: DISCONTINUED | OUTPATIENT
Start: 2020-04-29 | End: 2020-06-05

## 2020-04-29 RX ADMIN — AMLODIPINE BESYLATE 10 MILLIGRAM(S): 2.5 TABLET ORAL at 05:51

## 2020-04-29 RX ADMIN — Medication 650 MILLIGRAM(S): at 05:51

## 2020-04-29 RX ADMIN — Medication 2: at 22:36

## 2020-04-29 RX ADMIN — PANTOPRAZOLE SODIUM 40 MILLIGRAM(S): 20 TABLET, DELAYED RELEASE ORAL at 05:52

## 2020-04-29 RX ADMIN — HEPARIN SODIUM 7500 UNIT(S): 5000 INJECTION INTRAVENOUS; SUBCUTANEOUS at 05:51

## 2020-04-29 RX ADMIN — INSULIN GLARGINE 8 UNIT(S): 100 INJECTION, SOLUTION SUBCUTANEOUS at 22:37

## 2020-04-29 RX ADMIN — Medication 650 MILLIGRAM(S): at 13:28

## 2020-04-29 RX ADMIN — Medication 50 MILLIGRAM(S): at 00:30

## 2020-04-29 RX ADMIN — Medication 4 UNIT(S): at 08:21

## 2020-04-29 RX ADMIN — Medication 1 TABLET(S): at 11:07

## 2020-04-29 RX ADMIN — GABAPENTIN 200 MILLIGRAM(S): 400 CAPSULE ORAL at 11:01

## 2020-04-29 RX ADMIN — Medication 650 MILLIGRAM(S): at 22:37

## 2020-04-29 RX ADMIN — Medication 50 MILLIGRAM(S): at 23:50

## 2020-04-29 RX ADMIN — PANTOPRAZOLE SODIUM 40 MILLIGRAM(S): 20 TABLET, DELAYED RELEASE ORAL at 17:00

## 2020-04-29 RX ADMIN — CHLORHEXIDINE GLUCONATE 1 APPLICATION(S): 213 SOLUTION TOPICAL at 11:02

## 2020-04-29 RX ADMIN — HEPARIN SODIUM 7500 UNIT(S): 5000 INJECTION INTRAVENOUS; SUBCUTANEOUS at 13:30

## 2020-04-29 RX ADMIN — HEPARIN SODIUM 7500 UNIT(S): 5000 INJECTION INTRAVENOUS; SUBCUTANEOUS at 22:38

## 2020-04-29 RX ADMIN — Medication 2: at 12:02

## 2020-04-29 RX ADMIN — ATORVASTATIN CALCIUM 10 MILLIGRAM(S): 80 TABLET, FILM COATED ORAL at 22:37

## 2020-04-29 RX ADMIN — Medication 4 UNIT(S): at 12:02

## 2020-04-29 RX ADMIN — Medication 2: at 17:11

## 2020-04-29 RX ADMIN — Medication 4 UNIT(S): at 17:11

## 2020-04-29 NOTE — PROGRESS NOTE ADULT - ASSESSMENT
61 y/o M with PMH of HTN, DM complicated by neuropathy, IVDU, ETOH liver cirrhosis, who presents to [facility] for fall at home; found to have toxic megacolon and pneumatosis; underwent ex lap and subtotal colectomy, and ileostomy on 3/31. Developed COVID 19 while in the hospital, but subsequent test was positive, patient was transferred to [facility] tele COVID unit without need for supplemental oxygen. Now on RA, on wet to dry dressing, walked with PT, MEG improving, H/H downtrending s/p multiple units of PRBC, following up heme work up to determine if there is a source of bleed vs a hematologic cause for anemia; if H/H stable, then pt stable for dispo to [facility] when set up with SW.

## 2020-04-29 NOTE — ADVANCED PRACTICE NURSE CONSULT - RECOMMEDATIONS
Pt not knowledgeable enough of ostomy care to be safely discharged due to no follow up available at this time. Will continue to follow for teaching.

## 2020-04-29 NOTE — PROGRESS NOTE ADULT - ASSESSMENT
59 y/o M with PMH of HTN, DM complicated by neuropathy, EtOH liver cirrhosis, presenting w/ pneumatosis of unknown etiology, s/p sub total colectomy and end ileostomy on 3/31, c/b fever and purulent wound infection (4/9), ascites now s/p paracentesis 4/13. Course further complicated by worsening MEG,  anemia, and COVID+     # Decompensated cirrhosis- likely 2/2 alc use, ascites +, HE +, VH -  - MELD-Na of 28  - Ascites: s/p para on 4/10, 140 cc sent. Cytology negative, but not sent for cell count/culture. Patient has been on zosyn for 2 weeks so doubt SBP., repeat attempt made on 4/19 but unable to aspirate?   - HE: on Rifaximin.  - Varices: Unknown. H/h relatively stable without signs of active bleeding or melena in ostomy.   - HCC: no lesion seen on CT, recommend screening q6 months.    # MEG: HRS versus ATN   -Creatine continues to rise. Renal us shows no hydro. Continues to rise despite Albumin.   -on midodrine and octreotide  - Appreciate Nephrology recommendations.      # Anemia: VSS   - Hemoglobin relatively stable, based on lab trend hb range 7-8, vss  - no evidence of active/overt GI bleeding - hematemesis, coffee grounds, melena, BRB from ileostomy  - PPI OD  - monitor for melena/hematochezia/hematemesis   - initially planned for a VCE - but patient refused procedure  -currently no bleeding from ostomy and hb stable, will hold off on endoscopic evaluation for now      Discussed with attending Dr Frankel

## 2020-04-29 NOTE — PROGRESS NOTE ADULT - SUBJECTIVE AND OBJECTIVE BOX
INTERVAL HPI/OVERNIGHT EVENTS:    Patient is a 60y old  Male who presents with a chief complaint of COVID (29 Apr 2020 08:44)      Pt reports the following symptoms:    CONSTITUTIONAL:  Negative fever or chills, feels well, good appetite  EYES:  Negative  blurry vision or double vision  CARDIOVASCULAR:  Negative for chest pain or palpitations  RESPIRATORY:  Negative for cough, wheezing, or SOB   GASTROINTESTINAL:  Negative for nausea, vomiting, diarrhea, constipation, or abdominal pain  GENITOURINARY:  Negative frequency, urgency or dysuria  NEUROLOGIC:  No headache, confusion, dizziness, lightheadedness    MEDICATIONS  (STANDING):  amLODIPine   Tablet 10 milliGRAM(s) Oral daily  atorvastatin 10 milliGRAM(s) Oral at bedtime  chlorhexidine 2% Cloths 1 Application(s) Topical daily  dextrose 5%. 1000 milliLiter(s) (50 mL/Hr) IV Continuous <Continuous>  dextrose 50% Injectable 12.5 Gram(s) IV Push once  dextrose 50% Injectable 25 Gram(s) IV Push once  dextrose 50% Injectable 25 Gram(s) IV Push once  diphenoxylate/atropine 1 Tablet(s) Oral daily  gabapentin 200 milliGRAM(s) Oral daily  heparin   Injectable 7500 Unit(s) SubCutaneous every 8 hours  insulin glargine Injectable (LANTUS) 8 Unit(s) SubCutaneous at bedtime  insulin lispro (HumaLOG) corrective regimen sliding scale   SubCutaneous Before meals and at bedtime  insulin lispro Injectable (HumaLOG) 4 Unit(s) SubCutaneous three times a day before meals  pantoprazole    Tablet 40 milliGRAM(s) Oral every 12 hours  rifAXIMin 550 milliGRAM(s) Oral two times a day  sodium bicarbonate 650 milliGRAM(s) Oral three times a day    MEDICATIONS  (PRN):  acetaminophen   Tablet .. 500 milliGRAM(s) Oral every 6 hours PRN Temp greater or equal to 38C (100.4F), Mild Pain (1 - 3)  dextrose 40% Gel 15 Gram(s) Oral once PRN Blood Glucose LESS THAN 70 milliGRAM(s)/deciliter  glucagon  Injectable 1 milliGRAM(s) IntraMuscular once PRN Glucose LESS THAN 70 milligrams/deciliter  traZODone 50 milliGRAM(s) Oral at bedtime PRN Sleep      PHYSICAL EXAM  Vital Signs Last 24 Hrs  T(C): 36.6 (29 Apr 2020 05:42), Max: 36.9 (28 Apr 2020 22:40)  T(F): 97.9 (29 Apr 2020 05:42), Max: 98.4 (28 Apr 2020 22:40)  HR: 76 (29 Apr 2020 05:42) (72 - 76)  BP: 157/63 (29 Apr 2020 05:42) (147/60 - 157/63)  BP(mean): --  RR: 18 (29 Apr 2020 05:42) (18 - 18)  SpO2: 98% (29 Apr 2020 05:42) (96% - 98%)    Constitutional: wn/wd in NAD.   HEENT: NCAT, MMM, OP clear, EOMI, no proptosis or lid retraction  Neck: no thyromegaly or palpable thyroid nodules   Respiratory: lungs CTAB.  Cardiovascular: regular rhythm, normal S1 and S2, no audible murmurs, no peripheral edema  GI: soft, NT/ND, no masses/HSM appreciated.  Neurology: no tremors, DTR 2+  Skin: no visible rashes/lesions  Psychiatric: AAO x 3, normal affect/mood.    LABS:                        7.2    12.10 )-----------( 244      ( 29 Apr 2020 06:17 )             22.6     04-29    136  |  101  |  31<H>  ----------------------------<  86  4.4   |  22  |  3.24<H>    Ca    8.8      29 Apr 2020 06:17  Phos  4.2     04-29  Mg     1.6     04-29    TPro  6.9  /  Alb  2.9<L>  /  TBili  0.2  /  DBili  x   /  AST  19  /  ALT  10  /  AlkPhos  102  04-29    PT/INR - ( 29 Apr 2020 06:17 )   PT: 14.4 sec;   INR: 1.25          PTT - ( 29 Apr 2020 06:17 )  PTT:32.0 sec    Thyroid Stimulating Hormone, Serum: 3.325 uIU/mL (04-29 @ 06:17)  Thyroid Stimulating Hormone, Serum: 2.813 uIU/mL (04-14 @ 07:28)  Thyroid Stimulating Hormone, Serum: 2.877 uIU/mL (04-14 @ 00:34)      HbA1C: 6.9 % (04-05 @ 06:59)  7.6 % (03-31 @ 05:58)    CAPILLARY BLOOD GLUCOSE      POCT Blood Glucose.: 154 mg/dL (29 Apr 2020 11:46)  POCT Blood Glucose.: 92 mg/dL (29 Apr 2020 07:48)  POCT Blood Glucose.: 338 mg/dL (28 Apr 2020 21:31)  POCT Blood Glucose.: 173 mg/dL (28 Apr 2020 16:56)      Insulin Sliding Scale requirements X 24 Hours:    RADIOLOGY & ADDITIONAL TESTS:    A/P: 60y Male with history of DM type II presenting for       1.  DM -     Please continue           units lantus at bedtime  / in the morning and        units lispro with meals and lispro moderate / low dose sliding scale 4 times daily with meals and at bedtime.  Please continue consistent carbohydrate diet.      Goal FSG is   Will continue to monitor   For discharge, pt can continue    Pt can follow up at discharge with [facility] by calling  to make an appointment.   Will discuss case with     and update primary team INTERVAL HPI/OVERNIGHT EVENTS:    Patient is a 60y old  Male who presents with a chief complaint of COVID (29 Apr 2020 08:44)  Spoke to the primary team and the nurse taking care of the patient.   His discharge planning continues to be an issue.  involved  Monitoring renal function  TSH was 3.325 and Free T4 was 1.03 today.  saturating well on Room air  FSg and insulin administration reviewed    Pt reports the following symptoms:  CONSTITUTIONAL:  Negative chills  CARDIOVASCULAR:  Negative for chest pain or palpitations  RESPIRATORY:  Negative for cough, wheezing  GASTROINTESTINAL:  Negative for diarrhea, constipation  NEUROLOGIC:  No headache, confusion, dizziness, lightheadednes    MEDICATIONS  (STANDING):  amLODIPine   Tablet 10 milliGRAM(s) Oral daily  atorvastatin 10 milliGRAM(s) Oral at bedtime  chlorhexidine 2% Cloths 1 Application(s) Topical daily  dextrose 5%. 1000 milliLiter(s) (50 mL/Hr) IV Continuous <Continuous>  dextrose 50% Injectable 12.5 Gram(s) IV Push once  dextrose 50% Injectable 25 Gram(s) IV Push once  dextrose 50% Injectable 25 Gram(s) IV Push once  diphenoxylate/atropine 1 Tablet(s) Oral daily  gabapentin 200 milliGRAM(s) Oral daily  heparin   Injectable 7500 Unit(s) SubCutaneous every 8 hours  insulin glargine Injectable (LANTUS) 8 Unit(s) SubCutaneous at bedtime  insulin lispro (HumaLOG) corrective regimen sliding scale   SubCutaneous Before meals and at bedtime  insulin lispro Injectable (HumaLOG) 4 Unit(s) SubCutaneous three times a day before meals  pantoprazole    Tablet 40 milliGRAM(s) Oral every 12 hours  rifAXIMin 550 milliGRAM(s) Oral two times a day  sodium bicarbonate 650 milliGRAM(s) Oral three times a day    MEDICATIONS  (PRN):  acetaminophen   Tablet .. 500 milliGRAM(s) Oral every 6 hours PRN Temp greater or equal to 38C (100.4F), Mild Pain (1 - 3)  dextrose 40% Gel 15 Gram(s) Oral once PRN Blood Glucose LESS THAN 70 milliGRAM(s)/deciliter  glucagon  Injectable 1 milliGRAM(s) IntraMuscular once PRN Glucose LESS THAN 70 milligrams/deciliter  traZODone 50 milliGRAM(s) Oral at bedtime PRN Sleep      PHYSICAL EXAM  Vital Signs Last 24 Hrs  T(C): 36.6 (29 Apr 2020 05:42), Max: 36.9 (28 Apr 2020 22:40)  T(F): 97.9 (29 Apr 2020 05:42), Max: 98.4 (28 Apr 2020 22:40)  HR: 76 (29 Apr 2020 05:42) (72 - 76)  BP: 157/63 (29 Apr 2020 05:42) (147/60 - 157/63)  BP(mean): --  RR: 18 (29 Apr 2020 05:42) (18 - 18)  SpO2: 98% (29 Apr 2020 05:42) (96% - 98%)    Due to the nature of this patient’s COVID-19 isolation status (either confirmed or suspected), this note was prepared without a bedside physical examination to prevent spread of infection and to conserve personal protective equipment during this nationwide pandemic. If possible, direct patient communication occurred via electronic measures or telephone conversation. Examination highlights were provided by a bedside nurse wearing personal protective equipment and review of pertinent medical records. Objective data (vital signs, urine output, lab results, imaging studies, medications, etc) were reviewed in detail. Face to face visits and physical examination have been limited only to patients for whom it is required for medical decision making.    LABS:                        7.2    12.10 )-----------( 244      ( 29 Apr 2020 06:17 )             22.6     04-29    136  |  101  |  31<H>  ----------------------------<  86  4.4   |  22  |  3.24<H>    Ca    8.8      29 Apr 2020 06:17  Phos  4.2     04-29  Mg     1.6     04-29    TPro  6.9  /  Alb  2.9<L>  /  TBili  0.2  /  DBili  x   /  AST  19  /  ALT  10  /  AlkPhos  102  04-29    PT/INR - ( 29 Apr 2020 06:17 )   PT: 14.4 sec;   INR: 1.25          PTT - ( 29 Apr 2020 06:17 )  PTT:32.0 sec    Thyroid Stimulating Hormone, Serum: 3.325 uIU/mL (04-29 @ 06:17)  Thyroid Stimulating Hormone, Serum: 2.813 uIU/mL (04-14 @ 07:28)  Thyroid Stimulating Hormone, Serum: 2.877 uIU/mL (04-14 @ 00:34)      HbA1C: 6.9 % (04-05 @ 06:59)  7.6 % (03-31 @ 05:58)    CAPILLARY BLOOD GLUCOSE      POCT Blood Glucose.: 154 mg/dL (29 Apr 2020 11:46)  POCT Blood Glucose.: 92 mg/dL (29 Apr 2020 07:48)  POCT Blood Glucose.: 338 mg/dL (28 Apr 2020 21:31)  POCT Blood Glucose.: 173 mg/dL (28 Apr 2020 16:56)      Insulin Sliding Scale requirements X 24 Hours:    RADIOLOGY & ADDITIONAL TESTS:    A/P 60yMale with hx of DM now with COVID, worsening renal function, moderate hyperglycemia.     1.  DM: type 2,   Please continue Lantus 8 units at bedtime  PLease continue Lispro 4 units tid with meals.   Continue lispro moderate dose scale with meals   PLease change to lispro low dose sliding scale at bedtime.   Continue consistent carb diet  FSG Goal 100-180    2.  Hyperlipidemia - goal LDL < 70  - on lipitor 10 mg once daily    3.  Obesity - outpatient medical management.      4.  Hypothyroidism - may be subclinical  Stopped levothyroxine 50mcg on 4/29  - TSH was 2.8 on 4/14  On 4/29 TSh was 3.325 and Free T4 was 1.03  - PLease repeat TSh and Free T4 on May 4 ( Monday)    Discharge: No insulin. Start glimepiride 2mg or glipizide 4mg (whichever is most cost effective). Will provide glucometer. Will make f/u appt at [facility] [place]     Case seen and discussed with  and updated the primary team.

## 2020-04-29 NOTE — PROGRESS NOTE ADULT - ATTENDING COMMENTS
Agree with above.   Pt doing well on room air.   holding levothyroxine and will repeat tfts next week  reduce coverage to low dose at bedtime,   otherwise continue 8 lantus and 4 lispro tid with meals.   will follow

## 2020-04-29 NOTE — PROGRESS NOTE ADULT - ASSESSMENT
59 y/o M with PMH of HTN, DM complicated by neuropathy, EtOH liver cirrhosis, presenting w/ pneumatosis of unknown etiology, s/p sub total colectomy and end ileostomy on 3/31, c/b fever and purulent wound infection (4/9) now s/p paracentesis 4/13 found to be COVID (+). Hgb in am 7.7 from 9.3 day prior and 7.6 9/26, low suspicion for blood loss. Clinically stable.    - f/o Ileostomy output  - c/w Lomotil

## 2020-04-29 NOTE — PROGRESS NOTE ADULT - SUBJECTIVE AND OBJECTIVE BOX
STATUS POST:  Surgical pathology procedure  Transthoracic echocardiography (TTE)  US guided central cath  End ileostomy  Open subtotal colectomy  Transthoracic echocardiography (TTE)  CT chest wo con  CT abdomen pelvis  Limited B-scan ultrasound of retroperitoneal aorta    Patient seen and examined at bedside. In no acute distress. Having good ostomy function. Pain well controlled.     OBJECTIVE:    Vital Signs Last 24 Hrs  T(C): 36.6 (29 Apr 2020 05:42), Max: 36.9 (28 Apr 2020 13:18)  T(F): 97.9 (29 Apr 2020 05:42), Max: 98.4 (28 Apr 2020 13:18)  HR: 76 (29 Apr 2020 05:42) (72 - 80)  BP: 157/63 (29 Apr 2020 05:42) (140/74 - 157/63)  BP(mean): --  RR: 18 (29 Apr 2020 05:42) (18 - 18)  SpO2: 98% (29 Apr 2020 05:42) (96% - 98%)    I&O's Summary    28 Apr 2020 07:01  -  29 Apr 2020 07:00  --------------------------------------------------------  IN: 0 mL / OUT: 425 mL / NET: -425 mL        Physical Exam:  General: Resting comfortably in bed in no acute distress  Pulmonary: non labored breathing,   Cardiovascular: RRR  Abdominal: soft, chronically distended, midline wound with good granulation at superior portion of wound, wet ot dry dressing change, ostomy with liquid stool   Extremities: wwp, no edema    LABS:                        7.2    12.10 )-----------( 244      ( 29 Apr 2020 06:17 )             22.6     04-29    136  |  101  |  31<H>  ----------------------------<  86  4.4   |  22  |  3.24<H>    Ca    8.8      29 Apr 2020 06:17  Phos  4.2     04-29  Mg     1.6     04-29    TPro  6.9  /  Alb  2.9<L>  /  TBili  0.2  /  DBili  x   /  AST  19  /  ALT  10  /  AlkPhos  102  04-29    PT/INR - ( 29 Apr 2020 06:17 )   PT: 14.4 sec;   INR: 1.25          PTT - ( 29 Apr 2020 06:17 )  PTT:32.0 sec      RADIOLOGY & ADDITIONAL STUDIES:

## 2020-04-29 NOTE — PROGRESS NOTE ADULT - SUBJECTIVE AND OBJECTIVE BOX
Pt seen and examined at bedside.    PERTINENT REVIEW OF SYSTEMS:  as per IM progress note    Allergies    No Known Allergies    Intolerances      MEDICATIONS:  MEDICATIONS  (STANDING):  amLODIPine   Tablet 10 milliGRAM(s) Oral daily  atorvastatin 10 milliGRAM(s) Oral at bedtime  chlorhexidine 2% Cloths 1 Application(s) Topical daily  dextrose 5%. 1000 milliLiter(s) (50 mL/Hr) IV Continuous <Continuous>  dextrose 50% Injectable 12.5 Gram(s) IV Push once  dextrose 50% Injectable 25 Gram(s) IV Push once  dextrose 50% Injectable 25 Gram(s) IV Push once  diphenoxylate/atropine 1 Tablet(s) Oral daily  gabapentin 200 milliGRAM(s) Oral daily  heparin   Injectable 7500 Unit(s) SubCutaneous every 8 hours  insulin glargine Injectable (LANTUS) 8 Unit(s) SubCutaneous at bedtime  insulin lispro (HumaLOG) corrective regimen sliding scale   SubCutaneous Before meals and at bedtime  insulin lispro Injectable (HumaLOG) 4 Unit(s) SubCutaneous three times a day before meals  pantoprazole    Tablet 40 milliGRAM(s) Oral every 12 hours  rifAXIMin 550 milliGRAM(s) Oral two times a day  sodium bicarbonate 650 milliGRAM(s) Oral three times a day    MEDICATIONS  (PRN):  acetaminophen   Tablet .. 500 milliGRAM(s) Oral every 6 hours PRN Temp greater or equal to 38C (100.4F), Mild Pain (1 - 3)  dextrose 40% Gel 15 Gram(s) Oral once PRN Blood Glucose LESS THAN 70 milliGRAM(s)/deciliter  glucagon  Injectable 1 milliGRAM(s) IntraMuscular once PRN Glucose LESS THAN 70 milligrams/deciliter  traZODone 50 milliGRAM(s) Oral at bedtime PRN Sleep    Vital Signs Last 24 Hrs  T(C): 36.6 (29 Apr 2020 05:42), Max: 36.9 (28 Apr 2020 22:40)  T(F): 97.9 (29 Apr 2020 05:42), Max: 98.4 (28 Apr 2020 22:40)  HR: 76 (29 Apr 2020 05:42) (72 - 76)  BP: 157/63 (29 Apr 2020 05:42) (147/60 - 157/63)  BP(mean): --  RR: 18 (29 Apr 2020 05:42) (18 - 18)  SpO2: 98% (29 Apr 2020 05:42) (96% - 98%)    04-28 @ 07:01  -  04-29 @ 07:00  --------------------------------------------------------  IN: 0 mL / OUT: 425 mL / NET: -425 mL        LABS:                        7.2    12.10 )-----------( 244      ( 29 Apr 2020 06:17 )             22.6     04-29    136  |  101  |  31<H>  ----------------------------<  86  4.4   |  22  |  3.24<H>    Ca    8.8      29 Apr 2020 06:17  Phos  4.2     04-29  Mg     1.6     04-29    TPro  6.9  /  Alb  2.9<L>  /  TBili  0.2  /  DBili  x   /  AST  19  /  ALT  10  /  AlkPhos  102  04-29    PT/INR - ( 29 Apr 2020 06:17 )   PT: 14.4 sec;   INR: 1.25          PTT - ( 29 Apr 2020 06:17 )  PTT:32.0 sec                  RADIOLOGY & ADDITIONAL STUDIES:

## 2020-04-29 NOTE — PROGRESS NOTE ADULT - PROBLEM SELECTOR PLAN 9
F: none  E: cautious given renal failure  N: low fiber diet  GI ppx: protonix BID   DVT ppx: heparin 7500U q12h; DVT neg by ultrasound F: none  E: cautious given renal failure  N: low fiber diet  GI ppx: protonix BID   DVT ppx: holding heparin for possible bleeding; DVT neg by ultrasound

## 2020-04-29 NOTE — PROGRESS NOTE ADULT - PROBLEM SELECTOR PLAN 2
Decompensated cirrhosis likely 2/2 alcohol use, c/b ascites, hepatic encephalopathy on admission (resolved) sbp ruled out  - CT abd/pelvis with persistent large ascites and peritonitis; bilateral pleural effusions and pericardial effusion  - MELD-Na score 28  - s/p paracentesis on 4/10 with negative cytology but not sent for cell count/culture, however was on zosyn for 2 weeks so GI with no concerns for SBP   - repeat paracentesis 4/19, but unable to aspirate enough fluid?   - SAAG 1.4 consistent with portal hypertension as etiology of ascites  - continue rifaximin 550mg BID (changed from lactulose) for hepatic encephalopathy   - holding lactulose for high ostomy output  - unclear if pt with varices- pt refuses gi workup **today seems amenable to GI work up- monitoring hb and active T+S transfuse if needed  - Hgb slowly dropping, however ileostomy without melanotic stool; light brown soft stool  - pt refusing video capsule endoscopy this admission; explained risks of not undergoing procedure and pt understands and repeats back risks of refusing procedure.   - pt needs screening for HCC outpatient and follow up with hepatology outpatient

## 2020-04-29 NOTE — PROGRESS NOTE ADULT - ASSESSMENT
61 y/o man with MEG in setting of bowel resection and ileostomy for toxic megacolon and Etohic cirrhosis; BUN/creat down trending  h/o HTN, DM complicated by neuropathy, IVDU, ETOH liver cirrhosis, who presents to [facility] for [facility] at home. At [facility] patient reported that he takes lactulose at home daily and has daily BM, but he had no BM for 5 days prior to admission. 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 Patient underwent ex lap and subtotal colectomy, and ileostomy on 3/31.  COVID +    # MEG (acute kidney injury)  - likely ATN with component of both pre-renal from copious GI output and urine retention  - creat down to 3.1 BUN 31   - renal diet   - strict I/O  - Avoid ACE/ARB/NSAIDs      # metabolic acidosis  - improved to 22  - c/w present dosage of NaHCO3 650 mg TID for today    # Hyponatremia - 136  - improved     # HTN  - bp acceptable  - on amlodipine 10 mg po daily

## 2020-04-29 NOTE — ADVANCED PRACTICE NURSE CONSULT - ASSESSMENT
Reviewed emptying appliance with patient by having him demonstrate emptying pouch in toilet with verbal prompting. New 1 3/4" Angi 2 piece appliance placed with patient sitting up on side of bed. Pt having difficulty visualizing stoma when seated due to distended abdomen and is unable to stand up in bathroom long enough to place pouch. Reviewed frequency of emptying and changing appliance.

## 2020-04-29 NOTE — PROGRESS NOTE ADULT - ATTENDING COMMENTS
MEG- component of pre renal, post renal- both controlled- and intrinsic renal injury-   creat slowly drifting down  metabolic acidosis improving with bicarb tabs  keep hydrated

## 2020-04-29 NOTE — PROGRESS NOTE ADULT - SUBJECTIVE AND OBJECTIVE BOX
Medicine Progress Note    Patient is a 60y old  Male who presents with a chief complaint of Colectomy (19 Apr 2020 10:46)      SUBJECTIVE / OVERNIGHT EVENTS:    Patient seen and examined at bedside. No acute events overnight. Patient reports mild abd pain since surgery with continued ileostomy output of stool and gas. He explains his breathing has significantly improved since the beginning of his stay and he denies any f/c/s, cough, SOB, N/V, dizziness.    ADDITIONAL REVIEW OF SYSTEMS:    All other ROS negative except subjective.    MEDICATIONS  (STANDING):  amLODIPine   Tablet 10 milliGRAM(s) Oral daily  atorvastatin 10 milliGRAM(s) Oral at bedtime  chlorhexidine 2% Cloths 1 Application(s) Topical daily  dextrose 5%. 1000 milliLiter(s) (50 mL/Hr) IV Continuous <Continuous>  dextrose 50% Injectable 12.5 Gram(s) IV Push once  dextrose 50% Injectable 25 Gram(s) IV Push once  dextrose 50% Injectable 25 Gram(s) IV Push once  diphenoxylate/atropine 1 Tablet(s) Oral daily  gabapentin 200 milliGRAM(s) Oral daily  heparin   Injectable 7500 Unit(s) SubCutaneous every 8 hours  insulin glargine Injectable (LANTUS) 8 Unit(s) SubCutaneous at bedtime  insulin lispro (HumaLOG) corrective regimen sliding scale   SubCutaneous Before meals and at bedtime  insulin lispro Injectable (HumaLOG) 4 Unit(s) SubCutaneous three times a day before meals  pantoprazole    Tablet 40 milliGRAM(s) Oral every 12 hours  rifAXIMin 550 milliGRAM(s) Oral two times a day  sodium bicarbonate 650 milliGRAM(s) Oral three times a day    MEDICATIONS  (PRN):  acetaminophen   Tablet .. 500 milliGRAM(s) Oral every 6 hours PRN Temp greater or equal to 38C (100.4F), Mild Pain (1 - 3)  dextrose 40% Gel 15 Gram(s) Oral once PRN Blood Glucose LESS THAN 70 milliGRAM(s)/deciliter  glucagon  Injectable 1 milliGRAM(s) IntraMuscular once PRN Glucose LESS THAN 70 milligrams/deciliter  traZODone 50 milliGRAM(s) Oral at bedtime PRN Sleep    CAPILLARY BLOOD GLUCOSE      POCT Blood Glucose.: 92 mg/dL (29 Apr 2020 07:48)  POCT Blood Glucose.: 338 mg/dL (28 Apr 2020 21:31)  POCT Blood Glucose.: 173 mg/dL (28 Apr 2020 16:56)  POCT Blood Glucose.: 160 mg/dL (28 Apr 2020 11:46)    I&O's Summary    28 Apr 2020 07:01  -  29 Apr 2020 07:00  --------------------------------------------------------  IN: 0 mL / OUT: 425 mL / NET: -425 mL        PHYSICAL EXAM:  Vital Signs Last 24 Hrs  T(C): 36.6 (29 Apr 2020 05:42), Max: 36.9 (28 Apr 2020 13:18)  T(F): 97.9 (29 Apr 2020 05:42), Max: 98.4 (28 Apr 2020 13:18)  HR: 76 (29 Apr 2020 05:42) (72 - 80)  BP: 157/63 (29 Apr 2020 05:42) (140/74 - 157/63)  BP(mean): --  RR: 18 (29 Apr 2020 05:42) (18 - 18)  SpO2: 98% (29 Apr 2020 05:42) (96% - 98%)    CONSTITUTIONAL: NAD, well-developed, well-groomed  ENMT: Moist oral mucosa  RESPIRATORY: Normal respiratory effort; lungs are clear to auscultation bilaterally  CARDIOVASCULAR: Regular rate and rhythm, normal S1 and S2, no murmur/rub/gallop; No lower extremity edema  ABDOMEN: ileostomy pink and viable with copious loose stool in the ostomy bag and gas, Nontender to palpation, normoactive bowel sounds, no rebound/guarding;   PSYCH: A+O to person, place, and time; affect appropriate  NEUROLOGY: CN 2-12 are intact and symmetric; no gross sensory deficits   SKIN: No rashes; no palpable lesions    LABS:                        7.2    12.10 )-----------( 244      ( 29 Apr 2020 06:17 )             22.6     04-29    136  |  101  |  31<H>  ----------------------------<  86  4.4   |  22  |  3.24<H>    Ca    8.8      29 Apr 2020 06:17  Phos  4.2     04-29  Mg     1.6     04-29    TPro  6.9  /  Alb  2.9<L>  /  TBili  0.2  /  DBili  x   /  AST  19  /  ALT  10  /  AlkPhos  102  04-29    PT/INR - ( 29 Apr 2020 06:17 )   PT: 14.4 sec;   INR: 1.25          PTT - ( 29 Apr 2020 06:17 )  PTT:32.0 sec          COVID-19 PCR: Detected (13 Apr 2020 15:53)      RADIOLOGY & ADDITIONAL TESTS:  Imaging from Last 24 Hours:    Electrocardiogram/QTc Interval:    COORDINATION OF CARE:  Care Discussed with Consultants/Other Providers:

## 2020-04-29 NOTE — PROGRESS NOTE ADULT - SUBJECTIVE AND OBJECTIVE BOX
Patient seen and examined at bedside as per COVID protocol.  Meds, vitals, labs, imaging reviewed.        Medications:    acetaminophen   Tablet .. 500 milliGRAM(s) every 6 hours PRN  amLODIPine   Tablet 10 milliGRAM(s) daily  atorvastatin 10 milliGRAM(s) at bedtime  chlorhexidine 2% Cloths 1 Application(s) daily  dextrose 40% Gel 15 Gram(s) once PRN  dextrose 5%. 1000 milliLiter(s) <Continuous>  dextrose 50% Injectable 12.5 Gram(s) once  dextrose 50% Injectable 25 Gram(s) once  dextrose 50% Injectable 25 Gram(s) once  diphenoxylate/atropine 1 Tablet(s) daily  gabapentin 200 milliGRAM(s) daily  glucagon  Injectable 1 milliGRAM(s) once PRN  heparin   Injectable 7500 Unit(s) every 8 hours  insulin glargine Injectable (LANTUS) 8 Unit(s) at bedtime  insulin lispro (HumaLOG) corrective regimen sliding scale   Before meals and at bedtime  insulin lispro Injectable (HumaLOG) 4 Unit(s) three times a day before meals  pantoprazole    Tablet 40 milliGRAM(s) every 12 hours  rifAXIMin 550 milliGRAM(s) two times a day  sodium bicarbonate 650 milliGRAM(s) three times a day  traZODone 50 milliGRAM(s) at bedtime PRN      Allergies    No Known Allergies    Intolerances        T(C): , Max: 36.9 (04-28-20 @ 22:40)  T(F): , Max: 98.4 (04-28-20 @ 22:40)  HR: 76 (04-29-20 @ 05:42)  BP: 157/63 (04-29-20 @ 05:42)  BP(mean): --  RR: 18 (04-29-20 @ 05:42)  SpO2: 98% (04-29-20 @ 05:42)  Wt(kg): --    04-28 @ 07:01  -  04-29 @ 07:00  --------------------------------------------------------  IN:  Total IN: 0 mL    OUT:    Ileostomy: 200 mL    Voided: 225 mL  Total OUT: 425 mL    Total NET: -425 mL            ROS: Unable due to limited contact    Physical exam as per primary team due to COVID protocol  CONSTITUTIONAL: on room air  ENMT: Moist oral mucosa  RESPIRATORY: Normal respiratory effort; lungs are clear to auscultation bilaterally  CARDIOVASCULAR: Regular rate and rhythm, normal S1 and S2, no murmur/rub/gallop; No lower extremity edema  ABDOMEN: ileostomy pink and viable with copious loose stool in the ostomy bag and gas, Nontender to palpation, normoactive bowel sounds, no rebound/guarding;   PSYCH: A+O to person, place, and time; affect appropriat        LABS:                        7.2    12.10 )-----------( 244      ( 29 Apr 2020 06:17 )             22.6     04-29    136  |  101  |  31<H>  ----------------------------<  86  4.4   |  22  |  3.24<H>    Ca    8.8      29 Apr 2020 06:17  Phos  4.2     04-29  Mg     1.6     04-29    TPro  6.9  /  Alb  2.9<L>  /  TBili  0.2  /  DBili  x   /  AST  19  /  ALT  10  /  AlkPhos  102  04-29    [place]/Lambda Free Light Chain Ratio, Serum: 1.08 Ratio [0.26 - 1.65] (04-29 @ 01:02)    PT/INR - ( 29 Apr 2020 06:17 )   PT: 14.4 sec;   INR: 1.25          PTT - ( 29 Apr 2020 06:17 )  PTT:32.0 sec          RADIOLOGY & ADDITIONAL STUDIES:

## 2020-04-30 LAB
ALBUMIN SERPL ELPH-MCNC: 3.3 G/DL — SIGNIFICANT CHANGE UP (ref 3.3–5)
ALP SERPL-CCNC: 96 U/L — SIGNIFICANT CHANGE UP (ref 40–120)
ALT FLD-CCNC: 10 U/L — SIGNIFICANT CHANGE UP (ref 10–45)
ANION GAP SERPL CALC-SCNC: 12 MMOL/L — SIGNIFICANT CHANGE UP (ref 5–17)
AST SERPL-CCNC: 17 U/L — SIGNIFICANT CHANGE UP (ref 10–40)
BASOPHILS # BLD AUTO: 0.01 K/UL — SIGNIFICANT CHANGE UP (ref 0–0.2)
BASOPHILS NFR BLD AUTO: 0.1 % — SIGNIFICANT CHANGE UP (ref 0–2)
BILIRUB SERPL-MCNC: 0.2 MG/DL — SIGNIFICANT CHANGE UP (ref 0.2–1.2)
BUN SERPL-MCNC: 26 MG/DL — HIGH (ref 7–23)
CALCIUM SERPL-MCNC: 9 MG/DL — SIGNIFICANT CHANGE UP (ref 8.4–10.5)
CHLORIDE SERPL-SCNC: 102 MMOL/L — SIGNIFICANT CHANGE UP (ref 96–108)
CO2 SERPL-SCNC: 22 MMOL/L — SIGNIFICANT CHANGE UP (ref 22–31)
CREAT SERPL-MCNC: 2.88 MG/DL — HIGH (ref 0.5–1.3)
CRP SERPL-MCNC: 11.52 MG/DL — HIGH (ref 0–0.4)
D DIMER BLD IA.RAPID-MCNC: 2162 NG/ML DDU — HIGH
EOSINOPHIL # BLD AUTO: 0.26 K/UL — SIGNIFICANT CHANGE UP (ref 0–0.5)
EOSINOPHIL NFR BLD AUTO: 2.3 % — SIGNIFICANT CHANGE UP (ref 0–6)
FERRITIN SERPL-MCNC: 853 NG/ML — HIGH (ref 30–400)
GLUCOSE BLDC GLUCOMTR-MCNC: 112 MG/DL — HIGH (ref 70–99)
GLUCOSE BLDC GLUCOMTR-MCNC: 118 MG/DL — HIGH (ref 70–99)
GLUCOSE BLDC GLUCOMTR-MCNC: 134 MG/DL — HIGH (ref 70–99)
GLUCOSE BLDC GLUCOMTR-MCNC: 168 MG/DL — HIGH (ref 70–99)
GLUCOSE SERPL-MCNC: 110 MG/DL — HIGH (ref 70–99)
HCT VFR BLD CALC: 23.5 % — LOW (ref 39–50)
HGB BLD-MCNC: 7.4 G/DL — LOW (ref 13–17)
IMM GRANULOCYTES NFR BLD AUTO: 0.9 % — SIGNIFICANT CHANGE UP (ref 0–1.5)
LYMPHOCYTES # BLD AUTO: 0.66 K/UL — LOW (ref 1–3.3)
LYMPHOCYTES # BLD AUTO: 5.9 % — LOW (ref 13–44)
MAGNESIUM SERPL-MCNC: 1.8 MG/DL — SIGNIFICANT CHANGE UP (ref 1.6–2.6)
MCHC RBC-ENTMCNC: 28.2 PG — SIGNIFICANT CHANGE UP (ref 27–34)
MCHC RBC-ENTMCNC: 31.5 GM/DL — LOW (ref 32–36)
MCV RBC AUTO: 89.7 FL — SIGNIFICANT CHANGE UP (ref 80–100)
MONOCYTES # BLD AUTO: 1.01 K/UL — HIGH (ref 0–0.9)
MONOCYTES NFR BLD AUTO: 9 % — SIGNIFICANT CHANGE UP (ref 2–14)
NEUTROPHILS # BLD AUTO: 9.22 K/UL — HIGH (ref 1.8–7.4)
NEUTROPHILS NFR BLD AUTO: 81.8 % — HIGH (ref 43–77)
NRBC # BLD: 0 /100 WBCS — SIGNIFICANT CHANGE UP (ref 0–0)
PHOSPHATE SERPL-MCNC: 4.2 MG/DL — SIGNIFICANT CHANGE UP (ref 2.5–4.5)
PLATELET # BLD AUTO: 264 K/UL — SIGNIFICANT CHANGE UP (ref 150–400)
POTASSIUM SERPL-MCNC: 4.5 MMOL/L — SIGNIFICANT CHANGE UP (ref 3.5–5.3)
POTASSIUM SERPL-SCNC: 4.5 MMOL/L — SIGNIFICANT CHANGE UP (ref 3.5–5.3)
PROT SERPL-MCNC: 7.3 G/DL — SIGNIFICANT CHANGE UP (ref 6–8.3)
RBC # BLD: 2.62 M/UL — LOW (ref 4.2–5.8)
RBC # FLD: 14.4 % — SIGNIFICANT CHANGE UP (ref 10.3–14.5)
SODIUM SERPL-SCNC: 136 MMOL/L — SIGNIFICANT CHANGE UP (ref 135–145)
WBC # BLD: 11.26 K/UL — HIGH (ref 3.8–10.5)
WBC # FLD AUTO: 11.26 K/UL — HIGH (ref 3.8–10.5)

## 2020-04-30 PROCEDURE — 99231 SBSQ HOSP IP/OBS SF/LOW 25: CPT

## 2020-04-30 PROCEDURE — 99232 SBSQ HOSP IP/OBS MODERATE 35: CPT

## 2020-04-30 RX ADMIN — INSULIN GLARGINE 8 UNIT(S): 100 INJECTION, SOLUTION SUBCUTANEOUS at 22:34

## 2020-04-30 RX ADMIN — PANTOPRAZOLE SODIUM 40 MILLIGRAM(S): 20 TABLET, DELAYED RELEASE ORAL at 18:06

## 2020-04-30 RX ADMIN — Medication 500 MILLIGRAM(S): at 23:30

## 2020-04-30 RX ADMIN — CHLORHEXIDINE GLUCONATE 1 APPLICATION(S): 213 SOLUTION TOPICAL at 11:36

## 2020-04-30 RX ADMIN — Medication 4 UNIT(S): at 17:40

## 2020-04-30 RX ADMIN — Medication 500 MILLIGRAM(S): at 19:06

## 2020-04-30 RX ADMIN — PANTOPRAZOLE SODIUM 40 MILLIGRAM(S): 20 TABLET, DELAYED RELEASE ORAL at 06:13

## 2020-04-30 RX ADMIN — AMLODIPINE BESYLATE 10 MILLIGRAM(S): 2.5 TABLET ORAL at 06:13

## 2020-04-30 RX ADMIN — Medication 4 UNIT(S): at 12:45

## 2020-04-30 RX ADMIN — Medication 650 MILLIGRAM(S): at 22:33

## 2020-04-30 RX ADMIN — Medication 650 MILLIGRAM(S): at 06:13

## 2020-04-30 RX ADMIN — GABAPENTIN 200 MILLIGRAM(S): 400 CAPSULE ORAL at 12:46

## 2020-04-30 RX ADMIN — HEPARIN SODIUM 7500 UNIT(S): 5000 INJECTION INTRAVENOUS; SUBCUTANEOUS at 06:14

## 2020-04-30 RX ADMIN — Medication 650 MILLIGRAM(S): at 14:46

## 2020-04-30 RX ADMIN — Medication 1 TABLET(S): at 12:46

## 2020-04-30 RX ADMIN — HEPARIN SODIUM 7500 UNIT(S): 5000 INJECTION INTRAVENOUS; SUBCUTANEOUS at 14:45

## 2020-04-30 RX ADMIN — Medication 1: at 22:34

## 2020-04-30 RX ADMIN — Medication 50 MILLIGRAM(S): at 22:35

## 2020-04-30 RX ADMIN — HEPARIN SODIUM 7500 UNIT(S): 5000 INJECTION INTRAVENOUS; SUBCUTANEOUS at 22:33

## 2020-04-30 RX ADMIN — ATORVASTATIN CALCIUM 10 MILLIGRAM(S): 80 TABLET, FILM COATED ORAL at 22:32

## 2020-04-30 RX ADMIN — Medication 500 MILLIGRAM(S): at 09:14

## 2020-04-30 RX ADMIN — Medication 4 UNIT(S): at 08:55

## 2020-04-30 NOTE — PROGRESS NOTE ADULT - PROBLEM SELECTOR PLAN 4
Nonoliguric MEG 2/2 likey ATN, per nephrology. Per renal, likely combination of pre-renal, intrinsic cause 2/2 COVID-19 and contrast exposure on 4/10 vs vancomycin toxicity. IMPROVING  - previously on albumin, octreotide, and midodrine for albumin trial for possible hepatorenal syndrome, but without improvement in kidney function  - FeNa 2.0% and FeUrea 63% intrinsic disease  - Cr downtrending, renal says no indication of HD; follow up outpatient  - net even fluid balance   - renal recommending gentle IV hydration if high GI output  - avoid ACE/ARB/NSAIDs/metformin/contrast   - strict I/Os   - appreciate renal recs    #hyponatremia   RESOLVED     #metabolic acidosis   - sodium bicarbonate tabs TID per renal Nonoliguric MEG 2/2 likey ATN, per nephrology. Per renal, likely combination of pre-renal, intrinsic cause 2/2 COVID-19 and contrast exposure on 4/10 vs vancomycin toxicity. IMPROVING seen by nephro- creatine improved from 3.24 to 2.88  - previously on albumin, octreotide, and midodrine for albumin trial for possible hepatorenal syndrome, but without improvement in kidney function  - FeNa 2.0% and FeUrea 63% intrinsic disease  - Cr downtrending, renal says no indication of HD; follow up outpatient  - net even fluid balance   - renal recommending gentle IV hydration if high GI output  - avoid ACE/ARB/NSAIDs/metformin/contrast   - strict I/Os   - appreciate renal recs    #hyponatremia   RESOLVED     #metabolic acidosis   - sodium bicarbonate tabs TID per renal

## 2020-04-30 NOTE — PROGRESS NOTE ADULT - ASSESSMENT
61 y/o M with PMH of HTN, DM complicated by neuropathy, EtOH liver cirrhosis, presenting w/ pneumatosis of unknown etiology, s/p sub total colectomy and end ileostomy on 3/31, c/b fever and purulent wound infection (4/9), ascites now s/p paracentesis 4/13. Course further complicated by worsening MEG,  anemia, and COVID+     # Anemia: VSS   - Hemoglobin relatively stable, based on lab trend baseline hb range 7-8, vss  - no evidence of active/overt GI bleeding - hematemesis, coffee grounds, melena, BRB from ileostomy  - PPI OD  - monitor for melena/hematochezia/hematemesis   -currently no bleeding from ostomy and hb stable, will hold off on endoscopic evaluation for now    # Decompensated cirrhosis- likely 2/2 alc use, ascites +, HE +, VH -  - MELD-Na of 28  - Ascites: s/p para on 4/10, 140 cc sent. Cytology negative, but not sent for cell count/culture. Patient has been on zosyn for 2 weeks so doubt SBP., repeat attempt made on 4/19 but unable to aspirate?   - HE: on Rifaximin.  - Varices: Unknown. H/h relatively stable without signs of active bleeding or melena in ostomy.   - HCC: no lesion seen on CT, recommend screening q6 months.      Discussed with attending Dr Frankel

## 2020-04-30 NOTE — PROGRESS NOTE ADULT - SUBJECTIVE AND OBJECTIVE BOX
STATUS POST:      INTERVAL HPI/OVERNIGHT EVENTS:      SUBJECTIVE: Examined with chief resident at the bed, resting comfortably. Denies nausea and emesis. No acute,     amLODIPine   Tablet 10 milliGRAM(s) Oral daily  heparin   Injectable 7500 Unit(s) SubCutaneous every 8 hours  rifAXIMin 550 milliGRAM(s) Oral two times a day      Vital Signs Last 24 Hrs  T(C): 37.2 (30 Apr 2020 05:59), Max: 37.2 (30 Apr 2020 05:59)  T(F): 98.9 (30 Apr 2020 05:59), Max: 98.9 (30 Apr 2020 05:59)  HR: 80 (30 Apr 2020 05:59) (73 - 84)  BP: 159/64 (30 Apr 2020 05:59) (143/66 - 159/64)  BP(mean): --  RR: 19 (30 Apr 2020 05:59) (18 - 20)  SpO2: 96% (30 Apr 2020 05:59) (96% - 97%)  I&O's Detail    29 Apr 2020 07:01  -  30 Apr 2020 07:00  --------------------------------------------------------  IN:  Total IN: 0 mL    OUT:    Ileostomy: 100 mL  Total OUT: 100 mL    Total NET: -100 mL          Physical Exam:  General:   Pulmonary:   Cardiovascular:   Abdominal:   Extremities:   Neuro:   Pulses:         LABS:                        7.4    11.26 )-----------( 264      ( 30 Apr 2020 06:25 )             23.5     04-30    136  |  102  |  26<H>  ----------------------------<  110<H>  4.5   |  22  |  2.88<H>    Ca    9.0      30 Apr 2020 06:25  Phos  4.2     04-30  Mg     1.8     04-30    TPro  7.3  /  Alb  3.3  /  TBili  0.2  /  DBili  x   /  AST  17  /  ALT  10  /  AlkPhos  96  04-30    PT/INR - ( 29 Apr 2020 06:17 )   PT: 14.4 sec;   INR: 1.25          PTT - ( 29 Apr 2020 06:17 )  PTT:32.0 sec      RADIOLOGY & ADDITIONAL STUDIES: STATUS POST:      INTERVAL HPI/OVERNIGHT EVENTS:      SUBJECTIVE: Examined with chief resident at the bed, resting comfortably. Denies nausea and emesis. No acute complaints     amLODIPine   Tablet 10 milliGRAM(s) Oral daily  heparin   Injectable 7500 Unit(s) SubCutaneous every 8 hours  rifAXIMin 550 milliGRAM(s) Oral two times a day      Vital Signs Last 24 Hrs  T(C): 37.2 (30 Apr 2020 05:59), Max: 37.2 (30 Apr 2020 05:59)  T(F): 98.9 (30 Apr 2020 05:59), Max: 98.9 (30 Apr 2020 05:59)  HR: 80 (30 Apr 2020 05:59) (73 - 84)  BP: 159/64 (30 Apr 2020 05:59) (143/66 - 159/64)  BP(mean): --  RR: 19 (30 Apr 2020 05:59) (18 - 20)  SpO2: 96% (30 Apr 2020 05:59) (96% - 97%)  I&O's Detail    29 Apr 2020 07:01  -  30 Apr 2020 07:00  --------------------------------------------------------  IN:  Total IN: 0 mL    OUT:    Ileostomy: 100 mL  Total OUT: 100 mL    Total NET: -100 mL          Physical Exam:  General: Resting comfortably in bed, NAD  Pulmonary: non labored breathing on RA  Cardiovascular: RRR  Abdominal: soft, non tender, mildly distended, midline wound with good granulation, no purulence noted, dressing changed with wet to dry dressing, ileostomy with liquid stool    Extremities: [facility]          LABS:                        7.4    11.26 )-----------( 264      ( 30 Apr 2020 06:25 )             23.5     04-30    136  |  102  |  26<H>  ----------------------------<  110<H>  4.5   |  22  |  2.88<H>    Ca    9.0      30 Apr 2020 06:25  Phos  4.2     04-30  Mg     1.8     04-30    TPro  7.3  /  Alb  3.3  /  TBili  0.2  /  DBili  x   /  AST  17  /  ALT  10  /  AlkPhos  96  04-30    PT/INR - ( 29 Apr 2020 06:17 )   PT: 14.4 sec;   INR: 1.25          PTT - ( 29 Apr 2020 06:17 )  PTT:32.0 sec      RADIOLOGY & ADDITIONAL STUDIES:

## 2020-04-30 NOTE — PROGRESS NOTE ADULT - PROBLEM SELECTOR PLAN 9
F: none  E: cautious given renal failure  N: low fiber diet  GI ppx: protonix BID   DVT ppx: holding heparin for possible bleeding; DVT neg by ultrasound

## 2020-04-30 NOTE — PROGRESS NOTE ADULT - SUBJECTIVE AND OBJECTIVE BOX
INTERVAL HPI/OVERNIGHT EVENTS:    Patient is a 60y old  Male who presents with a chief complaint of +covid , toxic megacolon s/p partial colectomy and ileostomy (30 Apr 2020 09:39)      Pt reports the following symptoms:    CONSTITUTIONAL:  Negative fever or chills, feels well, good appetite  EYES:  Negative  blurry vision or double vision  CARDIOVASCULAR:  Negative for chest pain or palpitations  RESPIRATORY:  Negative for cough, wheezing, or SOB   GASTROINTESTINAL:  Negative for nausea, vomiting, diarrhea, constipation, or abdominal pain  GENITOURINARY:  Negative frequency, urgency or dysuria  NEUROLOGIC:  No headache, confusion, dizziness, lightheadedness    MEDICATIONS  (STANDING):  amLODIPine   Tablet 10 milliGRAM(s) Oral daily  atorvastatin 10 milliGRAM(s) Oral at bedtime  chlorhexidine 2% Cloths 1 Application(s) Topical daily  dextrose 5%. 1000 milliLiter(s) (50 mL/Hr) IV Continuous <Continuous>  dextrose 50% Injectable 12.5 Gram(s) IV Push once  dextrose 50% Injectable 25 Gram(s) IV Push once  dextrose 50% Injectable 25 Gram(s) IV Push once  diphenoxylate/atropine 1 Tablet(s) Oral daily  gabapentin 200 milliGRAM(s) Oral daily  heparin   Injectable 7500 Unit(s) SubCutaneous every 8 hours  insulin glargine Injectable (LANTUS) 8 Unit(s) SubCutaneous at bedtime  insulin lispro (HumaLOG) corrective regimen sliding scale   SubCutaneous at bedtime  insulin lispro (HumaLOG) corrective regimen sliding scale   SubCutaneous three times a day before meals  insulin lispro Injectable (HumaLOG) 4 Unit(s) SubCutaneous three times a day before meals  pantoprazole    Tablet 40 milliGRAM(s) Oral every 12 hours  rifAXIMin 550 milliGRAM(s) Oral two times a day  sodium bicarbonate 650 milliGRAM(s) Oral three times a day    MEDICATIONS  (PRN):  acetaminophen   Tablet .. 500 milliGRAM(s) Oral every 6 hours PRN Temp greater or equal to 38C (100.4F), Mild Pain (1 - 3)  dextrose 40% Gel 15 Gram(s) Oral once PRN Blood Glucose LESS THAN 70 milliGRAM(s)/deciliter  glucagon  Injectable 1 milliGRAM(s) IntraMuscular once PRN Glucose LESS THAN 70 milligrams/deciliter  traZODone 50 milliGRAM(s) Oral at bedtime PRN Sleep      PHYSICAL EXAM  Vital Signs Last 24 Hrs  T(C): 37.2 (30 Apr 2020 05:59), Max: 37.2 (30 Apr 2020 05:59)  T(F): 98.9 (30 Apr 2020 05:59), Max: 98.9 (30 Apr 2020 05:59)  HR: 80 (30 Apr 2020 05:59) (73 - 84)  BP: 159/64 (30 Apr 2020 05:59) (143/66 - 159/64)  BP(mean): --  RR: 19 (30 Apr 2020 05:59) (18 - 20)  SpO2: 96% (30 Apr 2020 05:59) (96% - 97%)    Constitutional: wn/wd in NAD.   HEENT: NCAT, MMM, OP clear, EOMI, no proptosis or lid retraction  Neck: no thyromegaly or palpable thyroid nodules   Respiratory: lungs CTAB.  Cardiovascular: regular rhythm, normal S1 and S2, no audible murmurs, no peripheral edema  GI: soft, NT/ND, no masses/HSM appreciated.  Neurology: no tremors, DTR 2+  Skin: no visible rashes/lesions  Psychiatric: AAO x 3, normal affect/mood.    LABS:                        7.4    11.26 )-----------( 264      ( 30 Apr 2020 06:25 )             23.5     04-30    136  |  102  |  26<H>  ----------------------------<  110<H>  4.5   |  22  |  2.88<H>    Ca    9.0      30 Apr 2020 06:25  Phos  4.2     04-30  Mg     1.8     04-30    TPro  7.3  /  Alb  3.3  /  TBili  0.2  /  DBili  x   /  AST  17  /  ALT  10  /  AlkPhos  96  04-30    PT/INR - ( 29 Apr 2020 06:17 )   PT: 14.4 sec;   INR: 1.25          PTT - ( 29 Apr 2020 06:17 )  PTT:32.0 sec    Thyroid Stimulating Hormone, Serum: 3.325 uIU/mL (04-29 @ 06:17)  Thyroid Stimulating Hormone, Serum: 2.813 uIU/mL (04-14 @ 07:28)  Thyroid Stimulating Hormone, Serum: 2.877 uIU/mL (04-14 @ 00:34)      HbA1C: 6.9 % (04-05 @ 06:59)  7.6 % (03-31 @ 05:58)    CAPILLARY BLOOD GLUCOSE      POCT Blood Glucose.: 134 mg/dL (30 Apr 2020 12:13)  POCT Blood Glucose.: 118 mg/dL (30 Apr 2020 08:19)  POCT Blood Glucose.: 203 mg/dL (29 Apr 2020 21:46)  POCT Blood Glucose.: 191 mg/dL (29 Apr 2020 17:00)      Insulin Sliding Scale requirements X 24 Hours:    RADIOLOGY & ADDITIONAL TESTS:    A/P: 60y Male with history of DM type II presenting for       1.  DM -     Please continue           units lantus at bedtime  / in the morning and        units lispro with meals and lispro moderate / low dose sliding scale 4 times daily with meals and at bedtime.  Please continue consistent carbohydrate diet.      Goal FSG is   Will continue to monitor   For discharge, pt can continue    Pt can follow up at discharge with [facility] by calling  to make an appointment.   Will discuss case with     and update primary team INTERVAL HPI/OVERNIGHT EVENTS:    Patient is a 60y old  Male who presents with a chief complaint of +covid , toxic megacolon s/p partial colectomy and ileostomy (30 Apr 2020 09:39)  No acute overnight events  pt eating well  levothyroxine discontinued.   insulin administration reviewed        Pt reports the following symptoms:    CONSTITUTIONAL:  Negative fever or chills, feels well, good appetite  EYES:  Negative  blurry vision or double vision  CARDIOVASCULAR:  Negative for chest pain or palpitations  RESPIRATORY:  Negative for cough, wheezing, or SOB   GASTROINTESTINAL:  Negative for nausea, vomiting, diarrhea, constipation, or abdominal pain  GENITOURINARY:  Negative frequency, urgency or dysuria  NEUROLOGIC:  No headache, confusion, dizziness, lightheadedness    MEDICATIONS  (STANDING):  amLODIPine   Tablet 10 milliGRAM(s) Oral daily  atorvastatin 10 milliGRAM(s) Oral at bedtime  chlorhexidine 2% Cloths 1 Application(s) Topical daily  dextrose 5%. 1000 milliLiter(s) (50 mL/Hr) IV Continuous <Continuous>  dextrose 50% Injectable 12.5 Gram(s) IV Push once  dextrose 50% Injectable 25 Gram(s) IV Push once  dextrose 50% Injectable 25 Gram(s) IV Push once  diphenoxylate/atropine 1 Tablet(s) Oral daily  gabapentin 200 milliGRAM(s) Oral daily  heparin   Injectable 7500 Unit(s) SubCutaneous every 8 hours  insulin glargine Injectable (LANTUS) 8 Unit(s) SubCutaneous at bedtime  insulin lispro (HumaLOG) corrective regimen sliding scale   SubCutaneous at bedtime  insulin lispro (HumaLOG) corrective regimen sliding scale   SubCutaneous three times a day before meals  insulin lispro Injectable (HumaLOG) 4 Unit(s) SubCutaneous three times a day before meals  pantoprazole    Tablet 40 milliGRAM(s) Oral every 12 hours  rifAXIMin 550 milliGRAM(s) Oral two times a day  sodium bicarbonate 650 milliGRAM(s) Oral three times a day    MEDICATIONS  (PRN):  acetaminophen   Tablet .. 500 milliGRAM(s) Oral every 6 hours PRN Temp greater or equal to 38C (100.4F), Mild Pain (1 - 3)  dextrose 40% Gel 15 Gram(s) Oral once PRN Blood Glucose LESS THAN 70 milliGRAM(s)/deciliter  glucagon  Injectable 1 milliGRAM(s) IntraMuscular once PRN Glucose LESS THAN 70 milligrams/deciliter  traZODone 50 milliGRAM(s) Oral at bedtime PRN Sleep      PHYSICAL EXAM  Vital Signs Last 24 Hrs  T(C): 37.2 (30 Apr 2020 05:59), Max: 37.2 (30 Apr 2020 05:59)  T(F): 98.9 (30 Apr 2020 05:59), Max: 98.9 (30 Apr 2020 05:59)  HR: 80 (30 Apr 2020 05:59) (73 - 84)  BP: 159/64 (30 Apr 2020 05:59) (143/66 - 159/64)  BP(mean): --  RR: 19 (30 Apr 2020 05:59) (18 - 20)  SpO2: 96% (30 Apr 2020 05:59) (96% - 97%)    Due to the nature of this patient’s COVID-19 isolation status (either confirmed or suspected), this note was prepared without a bedside physical examination to prevent spread of infection and to conserve personal protective equipment during this nationwide pandemic. If possible, direct patient communication occurred via electronic measures or telephone conversation. Examination highlights were provided by a bedside nurse wearing personal protective equipment and review of pertinent medical records. Objective data (vital signs, urine output, lab results, imaging studies, medications, etc) were reviewed in detail. Face to face visits and physical examination have been limited only to patients for whom it is required for medical decision making.    LABS:                        7.4    11.26 )-----------( 264      ( 30 Apr 2020 06:25 )             23.5     04-30    136  |  102  |  26<H>  ----------------------------<  110<H>  4.5   |  22  |  2.88<H>    Ca    9.0      30 Apr 2020 06:25  Phos  4.2     04-30  Mg     1.8     04-30    TPro  7.3  /  Alb  3.3  /  TBili  0.2  /  DBili  x   /  AST  17  /  ALT  10  /  AlkPhos  96  04-30    PT/INR - ( 29 Apr 2020 06:17 )   PT: 14.4 sec;   INR: 1.25          PTT - ( 29 Apr 2020 06:17 )  PTT:32.0 sec    Thyroid Stimulating Hormone, Serum: 3.325 uIU/mL (04-29 @ 06:17)  Thyroid Stimulating Hormone, Serum: 2.813 uIU/mL (04-14 @ 07:28)  Thyroid Stimulating Hormone, Serum: 2.877 uIU/mL (04-14 @ 00:34)      HbA1C: 6.9 % (04-05 @ 06:59)  7.6 % (03-31 @ 05:58)    CAPILLARY BLOOD GLUCOSE      POCT Blood Glucose.: 134 mg/dL (30 Apr 2020 12:13)  POCT Blood Glucose.: 118 mg/dL (30 Apr 2020 08:19)  POCT Blood Glucose.: 203 mg/dL (29 Apr 2020 21:46)  POCT Blood Glucose.: 191 mg/dL (29 Apr 2020 17:00)      Insulin Sliding Scale requirements X 24 Hours:    RADIOLOGY & ADDITIONAL TESTS:    A/P: 60y Male with history of DM type II presenting for       1.  DM -     Please continue           units lantus at bedtime  / in the morning and        units lispro with meals and lispro moderate / low dose sliding scale 4 times daily with meals and at bedtime.  Please continue consistent carbohydrate diet.      Goal FSG is   Will continue to monitor   For discharge, pt can continue    Pt can follow up at discharge with [facility] by calling  to make an appointment.   Will discuss case with     and update primary team INTERVAL HPI/OVERNIGHT EVENTS:    Patient is a 60y old  Male who presents with a chief complaint of +covid , toxic megacolon s/p partial colectomy and ileostomy (30 Apr 2020 09:39)  No acute overnight events  pt eating well  levothyroxine discontinued.   insulin administration reviewed        Pt reports the following symptoms:    CONSTITUTIONAL:  Negative fever or chills, feels well, good appetite  EYES:  Negative  blurry vision or double vision  CARDIOVASCULAR:  Negative for chest pain or palpitations  RESPIRATORY:  Negative for cough, wheezing, or SOB   GASTROINTESTINAL:  Negative for nausea, vomiting, diarrhea, constipation, or abdominal pain  GENITOURINARY:  Negative frequency, urgency or dysuria  NEUROLOGIC:  No headache, confusion, dizziness, lightheadedness    MEDICATIONS  (STANDING):  amLODIPine   Tablet 10 milliGRAM(s) Oral daily  atorvastatin 10 milliGRAM(s) Oral at bedtime  chlorhexidine 2% Cloths 1 Application(s) Topical daily  dextrose 5%. 1000 milliLiter(s) (50 mL/Hr) IV Continuous <Continuous>  dextrose 50% Injectable 12.5 Gram(s) IV Push once  dextrose 50% Injectable 25 Gram(s) IV Push once  dextrose 50% Injectable 25 Gram(s) IV Push once  diphenoxylate/atropine 1 Tablet(s) Oral daily  gabapentin 200 milliGRAM(s) Oral daily  heparin   Injectable 7500 Unit(s) SubCutaneous every 8 hours  insulin glargine Injectable (LANTUS) 8 Unit(s) SubCutaneous at bedtime  insulin lispro (HumaLOG) corrective regimen sliding scale   SubCutaneous at bedtime  insulin lispro (HumaLOG) corrective regimen sliding scale   SubCutaneous three times a day before meals  insulin lispro Injectable (HumaLOG) 4 Unit(s) SubCutaneous three times a day before meals  pantoprazole    Tablet 40 milliGRAM(s) Oral every 12 hours  rifAXIMin 550 milliGRAM(s) Oral two times a day  sodium bicarbonate 650 milliGRAM(s) Oral three times a day    MEDICATIONS  (PRN):  acetaminophen   Tablet .. 500 milliGRAM(s) Oral every 6 hours PRN Temp greater or equal to 38C (100.4F), Mild Pain (1 - 3)  dextrose 40% Gel 15 Gram(s) Oral once PRN Blood Glucose LESS THAN 70 milliGRAM(s)/deciliter  glucagon  Injectable 1 milliGRAM(s) IntraMuscular once PRN Glucose LESS THAN 70 milligrams/deciliter  traZODone 50 milliGRAM(s) Oral at bedtime PRN Sleep      PHYSICAL EXAM  Vital Signs Last 24 Hrs  T(C): 37.2 (30 Apr 2020 05:59), Max: 37.2 (30 Apr 2020 05:59)  T(F): 98.9 (30 Apr 2020 05:59), Max: 98.9 (30 Apr 2020 05:59)  HR: 80 (30 Apr 2020 05:59) (73 - 84)  BP: 159/64 (30 Apr 2020 05:59) (143/66 - 159/64)  BP(mean): --  RR: 19 (30 Apr 2020 05:59) (18 - 20)  SpO2: 96% (30 Apr 2020 05:59) (96% - 97%)    Due to the nature of this patient’s COVID-19 isolation status (either confirmed or suspected), this note was prepared without a bedside physical examination to prevent spread of infection and to conserve personal protective equipment during this nationwide pandemic. If possible, direct patient communication occurred via electronic measures or telephone conversation. Examination highlights were provided by a bedside nurse wearing personal protective equipment and review of pertinent medical records. Objective data (vital signs, urine output, lab results, imaging studies, medications, etc) were reviewed in detail. Face to face visits and physical examination have been limited only to patients for whom it is required for medical decision making.    LABS:                        7.4    11.26 )-----------( 264      ( 30 Apr 2020 06:25 )             23.5     04-30    136  |  102  |  26<H>  ----------------------------<  110<H>  4.5   |  22  |  2.88<H>    Ca    9.0      30 Apr 2020 06:25  Phos  4.2     04-30  Mg     1.8     04-30    TPro  7.3  /  Alb  3.3  /  TBili  0.2  /  DBili  x   /  AST  17  /  ALT  10  /  AlkPhos  96  04-30    PT/INR - ( 29 Apr 2020 06:17 )   PT: 14.4 sec;   INR: 1.25          PTT - ( 29 Apr 2020 06:17 )  PTT:32.0 sec    Thyroid Stimulating Hormone, Serum: 3.325 uIU/mL (04-29 @ 06:17)  Thyroid Stimulating Hormone, Serum: 2.813 uIU/mL (04-14 @ 07:28)  Thyroid Stimulating Hormone, Serum: 2.877 uIU/mL (04-14 @ 00:34)      HbA1C: 6.9 % (04-05 @ 06:59)  7.6 % (03-31 @ 05:58)    CAPILLARY BLOOD GLUCOSE      POCT Blood Glucose.: 134 mg/dL (30 Apr 2020 12:13)  POCT Blood Glucose.: 118 mg/dL (30 Apr 2020 08:19)  POCT Blood Glucose.: 203 mg/dL (29 Apr 2020 21:46)  POCT Blood Glucose.: 191 mg/dL (29 Apr 2020 17:00)      Insulin Sliding Scale requirements X 24 Hours:    RADIOLOGY & ADDITIONAL TESTS:

## 2020-04-30 NOTE — PROGRESS NOTE ADULT - ASSESSMENT
61 y/o man with MEG in setting of bowel resection and ileostomy for toxic megacolon and Etohic cirrhosis;   BUN/creat continue to down trend  adequate urine output  h/o HTN, DM complicated by neuropathy, IVDU, ETOH liver cirrhosis;  initially presented  to [facility] for fall at home. At [facility] patient reported that he takes lactulose at home daily and has daily BM, but he had no BM for 5 days prior to admission. 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 Patient underwent ex lap and subtotal colectomy, and ileostomy on 3/31.  COVID +    - MEG BUN/creat improving   likely ATN with component of both pre-renal from copious GI output and urine retention  reminded to maintain good fluid intake    renal diet    strict I/O    -  metabolic acidosis- serum CO2 stable at 22- c/w NaHCO3  tabs 650 mg TID     - Hyponatremia -  remains stable at low normal- 136- okay    -HTN  BP < 145 most times- if increases will need to adjust BP meds-   for now, c/w amlodipine 10 mg daily

## 2020-04-30 NOTE — CHART NOTE - NSCHARTNOTEFT_GEN_A_CORE
Admitting Diagnosis:   Patient is a 60y old  Male who presents with a chief complaint of +covid , toxic megacolon s/p partial colectomy and ileostomy (30 Apr 2020 09:39)      PAST MEDICAL & SURGICAL HISTORY:  Anemia  ETOH abuse  HLD (hyperlipidemia)  HTN (hypertension)  DM (diabetes mellitus)  No significant past surgical history      Current Nutrition Order :C-CHO/no snack/Low fiber       PO Intake: Good (%) [ x  ]  Fair (50-75%) [   ] Poor (<25%) [   ]    GI Issues: Colectomy and ileostomy    Pain :not reported    Skin Integrity purulent wound infection    Labs:   04-30    136  |  102  |  26<H>  ----------------------------<  110<H>  4.5   |  22  |  2.88<H>    Ca    9.0      30 Apr 2020 06:25  Phos  4.2     04-30  Mg     1.8     04-30    TPro  7.3  /  Alb  3.3  /  TBili  0.2  /  DBili  x   /  AST  17  /  ALT  10  /  AlkPhos  96  04-30    CAPILLARY BLOOD GLUCOSE      POCT Blood Glucose.: 134 mg/dL (30 Apr 2020 12:13)  POCT Blood Glucose.: 118 mg/dL (30 Apr 2020 08:19)  POCT Blood Glucose.: 203 mg/dL (29 Apr 2020 21:46)  POCT Blood Glucose.: 191 mg/dL (29 Apr 2020 17:00)      Medications:  MEDICATIONS  (STANDING):  amLODIPine   Tablet 10 milliGRAM(s) Oral daily  atorvastatin 10 milliGRAM(s) Oral at bedtime  chlorhexidine 2% Cloths 1 Application(s) Topical daily  dextrose 5%. 1000 milliLiter(s) (50 mL/Hr) IV Continuous <Continuous>  dextrose 50% Injectable 12.5 Gram(s) IV Push once  dextrose 50% Injectable 25 Gram(s) IV Push once  dextrose 50% Injectable 25 Gram(s) IV Push once  diphenoxylate/atropine 1 Tablet(s) Oral daily  gabapentin 200 milliGRAM(s) Oral daily  heparin   Injectable 7500 Unit(s) SubCutaneous every 8 hours  insulin glargine Injectable (LANTUS) 8 Unit(s) SubCutaneous at bedtime  insulin lispro (HumaLOG) corrective regimen sliding scale   SubCutaneous at bedtime  insulin lispro (HumaLOG) corrective regimen sliding scale   SubCutaneous three times a day before meals  insulin lispro Injectable (HumaLOG) 4 Unit(s) SubCutaneous three times a day before meals  pantoprazole    Tablet 40 milliGRAM(s) Oral every 12 hours  rifAXIMin 550 milliGRAM(s) Oral two times a day  sodium bicarbonate 650 milliGRAM(s) Oral three times a day    MEDICATIONS  (PRN):  acetaminophen   Tablet .. 500 milliGRAM(s) Oral every 6 hours PRN Temp greater or equal to 38C (100.4F), Mild Pain (1 - 3)  dextrose 40% Gel 15 Gram(s) Oral once PRN Blood Glucose LESS THAN 70 milliGRAM(s)/deciliter  glucagon  Injectable 1 milliGRAM(s) IntraMuscular once PRN Glucose LESS THAN 70 milligrams/deciliter  traZODone 50 milliGRAM(s) Oral at bedtime PRN Sleep      Weight:90.7kg  Daily     Daily   no updated weights  Weight Change: no updated weights    Estimated energy needs: Based on IBW due to above 120% of IBW.IBW:67.3kg r60-91gjtd:1682-2019kcal and 1.2-1.4gmprotein:81-94gmprotein and fluids per team    Subjective: 61y/o male with history of HTN,2DM,Neuropathy,ETOH liver Cirrhosis presented with pneumotosis of unknown etiology.. Crescencio-colon s/p subtotal colectomy and ileostomy on 3/31 found to be COVID-19+.Nonoliguiric MEG. Eating 80% of meals with issues .Renal recommendations noted: renal diet .Noted plans for possible D/C to shelter    Previous Nutrition Diagnosis :Increased nutrient needs r/t post-op demands    Active [ x  ]  Resolved [   ]    If resolved, new PES:     Goal: Meet 80% of needs consistently    Recommendations:1.Consider 60gmprotein.C-CHO with no snack if renal diet is considered 2.Updated weights   Education:   RD will follow up with renal diet if indicated  Risk Level: High [   ] Moderate [ x  ] Low [   ]

## 2020-04-30 NOTE — PROGRESS NOTE ADULT - ATTENDING COMMENTS
A/P 60yMale with hx of DM, hypothyroidism, now with COVID    1.  DM: type 2, controlled  continue lantus 8 units at bedtime, 4 units lispro tid with meals   Continue lispro moderate dose scale with meals and low dose at bedtime.   Continue consistent carb diet  FSG Goal 100-180    2.  Hyperlipidemia - goal LDL < 70  continue atorvastatin    3.  Obesity - outpatient medical management.      4.  Hypothyroidism - will repeat TFTs in 3 days to monitor need for levothyroxine.  Pt with no evidence of autoimmune hypothyroidism at this time.     Pt is advised to follow up with me at discharge by calling .      Evelyne Underwood MD, PhD  Endocrinology  [address]  [place] [address]  [phone] Tel  [phone] Fax  [email or web address]

## 2020-04-30 NOTE — PROGRESS NOTE ADULT - PROBLEM SELECTOR PLAN 5
Normocytic anemia. Likely 2/2 anemia of chronic disease and kidney dysfunction vs acute blood loss. Hgb 6.9 4/24, ileostomy output with brown stool   -monitor hb, transfuse <7  - s/p 4U pRBCs total this admission as of 4/24  - continue po pantoprazole 40mg BID   - monitor for signs of bleeding   - GI following and planned for video capsule endoscopy this admission, but pt refused Normocytic anemia. Likely 2/2 anemia of chronic disease and kidney dysfunction vs acute blood loss. Hgb 7.4 4/30, ileostomy output with brown stool   -monitor hb, transfuse <7  - s/p 4U pRBCs total this admission as of 4/24  - continue po pantoprazole 40mg BID   - monitor for signs of bleeding   - GI following and planned for video capsule endoscopy this admission, but pt refused

## 2020-04-30 NOTE — PROGRESS NOTE ADULT - SUBJECTIVE AND OBJECTIVE BOX
SUBJECTIVE: Patient seen and examined at bedside. Pt states he is feeling better with still having some generalized weakness that has been persistent and his dizziness has subsided . Pt denies CP, SOB, fever/chills, blood in stool, abd pain. pt states he is not ambulating on his own but does walk when PT comes.    OBJECTIVE:    VITAL SIGNS:  ICU Vital Signs Last 24 Hrs  T(C): 37.2 (30 Apr 2020 05:59), Max: 37.2 (30 Apr 2020 05:59)  T(F): 98.9 (30 Apr 2020 05:59), Max: 98.9 (30 Apr 2020 05:59)  HR: 80 (30 Apr 2020 05:59) (73 - 84)  BP: 159/64 (30 Apr 2020 05:59) (143/66 - 159/64)  BP(mean): --  ABP: --  ABP(mean): --  RR: 19 (30 Apr 2020 05:59) (18 - 20)  SpO2: 96% (30 Apr 2020 05:59) (96% - 97%)        04-29 @ 07:01  -  04-30 @ 07:00  --------------------------------------------------------  IN: 0 mL / OUT: 100 mL / NET: -100 mL      CAPILLARY BLOOD GLUCOSE      POCT Blood Glucose.: 118 mg/dL (30 Apr 2020 08:19)      PHYSICAL EXAM:    General: NAD  HEENT: NC/AT; PERRL, clear conjunctiva  Neck: supple  Respiratory: CTA b/l  Cardiovascular: RRR  Abdomen: soft, NT/ND; + ostomy with greenish/brow stool w/o blood. +wet to dry             dressing f/u by surgery  Extremities: WWP, 2+ peripheral pulses b/l; no LE edema  Skin: normal color and turgor; no rash    MEDICATIONS:  MEDICATIONS  (STANDING):  amLODIPine   Tablet 10 milliGRAM(s) Oral daily  atorvastatin 10 milliGRAM(s) Oral at bedtime  chlorhexidine 2% Cloths 1 Application(s) Topical daily  dextrose 5%. 1000 milliLiter(s) (50 mL/Hr) IV Continuous <Continuous>  dextrose 50% Injectable 12.5 Gram(s) IV Push once  dextrose 50% Injectable 25 Gram(s) IV Push once  dextrose 50% Injectable 25 Gram(s) IV Push once  diphenoxylate/atropine 1 Tablet(s) Oral daily  gabapentin 200 milliGRAM(s) Oral daily  heparin   Injectable 7500 Unit(s) SubCutaneous every 8 hours  insulin glargine Injectable (LANTUS) 8 Unit(s) SubCutaneous at bedtime  insulin lispro (HumaLOG) corrective regimen sliding scale   SubCutaneous at bedtime  insulin lispro (HumaLOG) corrective regimen sliding scale   SubCutaneous three times a day before meals  insulin lispro Injectable (HumaLOG) 4 Unit(s) SubCutaneous three times a day before meals  pantoprazole    Tablet 40 milliGRAM(s) Oral every 12 hours  rifAXIMin 550 milliGRAM(s) Oral two times a day  sodium bicarbonate 650 milliGRAM(s) Oral three times a day    MEDICATIONS  (PRN):  acetaminophen   Tablet .. 500 milliGRAM(s) Oral every 6 hours PRN Temp greater or equal to 38C (100.4F), Mild Pain (1 - 3)  dextrose 40% Gel 15 Gram(s) Oral once PRN Blood Glucose LESS THAN 70 milliGRAM(s)/deciliter  glucagon  Injectable 1 milliGRAM(s) IntraMuscular once PRN Glucose LESS THAN 70 milligrams/deciliter  traZODone 50 milliGRAM(s) Oral at bedtime PRN Sleep      ALLERGIES:  Allergies    No Known Allergies    Intolerances        LABS:                        7.4    11.26 )-----------( 264      ( 30 Apr 2020 06:25 )             23.5     04-30    136  |  102  |  26<H>  ----------------------------<  110<H>  4.5   |  22  |  2.88<H>    Ca    9.0      30 Apr 2020 06:25  Phos  4.2     04-30  Mg     1.8     04-30    TPro  7.3  /  Alb  3.3  /  TBili  0.2  /  DBili  x   /  AST  17  /  ALT  10  /  AlkPhos  96  04-30    PT/INR - ( 29 Apr 2020 06:17 )   PT: 14.4 sec;   INR: 1.25          PTT - ( 29 Apr 2020 06:17 )  PTT:32.0 sec      Procalcitonin:   D-dimer: D-Dimer Assay, Quantitative: 2162 ng/mL DDU (04-30-20 @ 06:25)  D-Dimer Assay, Quantitative: 2277 ng/mL DDU (04-29-20 @ 06:17)  D-Dimer Assay, Quantitative: 2375 ng/mL DDU (04-28-20 @ 06:54)    ESR:   CRP: C-Reactive Protein, Serum: 11.52 mg/dL (04-30-20 @ 06:25)  C-Reactive Protein, Serum: 12.39 mg/dL (04-29-20 @ 06:17)  C-Reactive Protein, Serum: 11.02 mg/dL (04-28-20 @ 06:54)    LDH: Lactate Dehydrogenase, Serum: 321 U/L (04-28-20 @ 06:54)    Ferritin: Ferritin, Serum: 853 ng/mL (04-30-20 @ 06:25)  Ferritin, Serum: 815 ng/mL (04-29-20 @ 06:17)  Ferritin, Serum: 887 ng/mL (04-28-20 @ 16:48)  Ferritin, Serum: 846 ng/mL (04-28-20 @ 06:54)    COVID Labs  G6PD: Glucose-6-Phosphate Dehydrogenase: 20.6 U/g Hgb (04-14-20 @ 11:02)    RADIOLOGY & ADDITIONAL TESTS: Reviewed. No new testing performed

## 2020-04-30 NOTE — PROGRESS NOTE ADULT - PROBLEM SELECTOR PLAN 1
COVID+   - s/p hydroxychloroquine and azithromycin 4/13-4/16  - saturating 98% on RA COVID+   - s/p hydroxychloroquine and azithromycin 4/13-4/16  - saturating 94% on RA

## 2020-04-30 NOTE — PROGRESS NOTE ADULT - PROBLEM SELECTOR PLAN 2
Decompensated cirrhosis likely 2/2 alcohol use, c/b ascites, hepatic encephalopathy on admission (resolved) sbp ruled out  - CT abd/pelvis with persistent large ascites and peritonitis; bilateral pleural effusions and pericardial effusion  - MELD-Na score 28  - s/p paracentesis on 4/10 with negative cytology but not sent for cell count/culture, however was on zosyn for 2 weeks so GI with no concerns for SBP   - repeat paracentesis 4/19, but unable to aspirate enough fluid?   - SAAG 1.4 consistent with portal hypertension as etiology of ascites  - continue rifaximin 550mg BID (changed from lactulose) for hepatic encephalopathy   - holding lactulose for high ostomy output  - unclear if pt with varices- pt refuses gi workup **today seems amenable to GI work up- monitoring hb and active T+S transfuse if needed  - Hgb slowly dropping, however ileostomy without melanotic stool; light brown soft stool  - pt refusing video capsule endoscopy this admission; explained risks of not undergoing procedure and pt understands and repeats back risks of refusing procedure.   - pt needs screening for HCC outpatient and follow up with hepatology outpatient Decompensated cirrhosis likely 2/2 alcohol use, c/b ascites, hepatic encephalopathy on admission (resolved) sbp ruled out  - CT abd/pelvis with persistent large ascites and peritonitis; bilateral pleural effusions and pericardial effusion  - MELD-Na score 28  - s/p paracentesis on 4/10 with negative cytology but not sent for cell count/culture, however was on zosyn for 2 weeks so GI with no concerns for SBP   - repeat paracentesis 4/19, but unable to aspirate enough fluid?   - SAAG 1.4 consistent with portal hypertension as etiology of ascites  - continue rifaximin 550mg BID (changed from lactulose) for hepatic encephalopathy   - holding lactulose for high ostomy output  - unclear if pt with varices- pt refuses gi workup **today seems amenable to GI work up- monitoring hb and active T+S transfuse if needed  - Hgb slowly dropping, however ileostomy without melanotic stool; light brown/green soft stool  - pt refusing video capsule endoscopy this admission; explained risks of not undergoing procedure and pt understands and repeats back risks of refusing procedure.   - pt needs screening for HCC outpatient and follow up with hepatology outpatient

## 2020-04-30 NOTE — PROGRESS NOTE ADULT - SUBJECTIVE AND OBJECTIVE BOX
Patient seen and reviewed with care team  BUN/creat dropping      T(C): , Max: 37.2 (04-30-20 @ 05:59)  T(F): , Max: 98.9 (04-30-20 @ 05:59)  HR: 80 (04-30-20 @ 05:59)  BP: 159/64 (04-30-20 @ 05:59)  BP(mean): --  RR: 19 (04-30-20 @ 05:59)  SpO2: 96% (04-30-20 @ 05:59)  Wt(kg): --    04-29 @ 07:01  -  04-30 @ 07:00  --------------------------------------------------------  IN:  Total IN: 0 mL    OUT:    Ileostomy: 100 mL  Total OUT: 100 mL    Total NET: -100 mL            amLODIPine   Tablet 10 daily  atorvastatin 10 at bedtime  chlorhexidine 2% Cloths 1 daily  dextrose 5%. 1000 <Continuous>  dextrose 50% Injectable 12.5 once  dextrose 50% Injectable 25 once  dextrose 50% Injectable 25 once  diphenoxylate/atropine 1 daily  gabapentin 200 daily  heparin   Injectable 7500 every 8 hours  insulin glargine Injectable (LANTUS) 8 at bedtime  insulin lispro (HumaLOG) corrective regimen sliding scale  at bedtime  insulin lispro (HumaLOG) corrective regimen sliding scale  three times a day before meals  insulin lispro Injectable (HumaLOG) 4 three times a day before meals  pantoprazole    Tablet 40 every 12 hours  rifAXIMin 550 two times a day  sodium bicarbonate 650 three times a day    Allergies    No Known Allergies    Intolerances      PHYSICAL EXAM:  Constitutional:  No acute distress  ENMT: Moist mucous membrane.    Neck: Supple.   Back: No CVA tenderness  Respiratory: Clear to auscultation   Cardiovascular: S1, S2.  Regular rate and rhythm.    Gastrointestinal: soft, non-tender, non-distended, normal bowel sounds; no HSM  Vasc/Extremities:  No lower extremity edema.    Neurological: No focal deficits.  Skin: Warm. Dry.    Lymph Nodes:  No cervical lymph nodes.    Psychiatric: Normal affect.    ACCESS:     LABS:                        7.4    11.26 )-----------( 264      ( 30 Apr 2020 06:25 )             23.5     04-30    136  |  102  |  26<H>  ----------------------------<  110<H>  4.5   |  22  |  2.88<H>    Ca    9.0      30 Apr 2020 06:25  Phos  4.2     04-30  Mg     1.8     04-30    TPro  7.3  /  Alb  3.3  /  TBili  0.2  /  DBili  x   /  AST  17  /  ALT  10  /  AlkPhos  96  04-30      PT/INR - ( 29 Apr 2020 06:17 )   PT: 14.4 sec;   INR: 1.25          PTT - ( 29 Apr 2020 06:17 )  PTT:32.0 sec          RADIOLOGY & ADDITIONAL STUDIES:

## 2020-04-30 NOTE — PROGRESS NOTE ADULT - SUBJECTIVE AND OBJECTIVE BOX
PERTINENT REVIEW OF SYSTEMS:  per IM progress note    Allergies    No Known Allergies    Intolerances      MEDICATIONS:  MEDICATIONS  (STANDING):  amLODIPine   Tablet 10 milliGRAM(s) Oral daily  atorvastatin 10 milliGRAM(s) Oral at bedtime  chlorhexidine 2% Cloths 1 Application(s) Topical daily  dextrose 5%. 1000 milliLiter(s) (50 mL/Hr) IV Continuous <Continuous>  dextrose 50% Injectable 12.5 Gram(s) IV Push once  dextrose 50% Injectable 25 Gram(s) IV Push once  dextrose 50% Injectable 25 Gram(s) IV Push once  diphenoxylate/atropine 1 Tablet(s) Oral daily  gabapentin 200 milliGRAM(s) Oral daily  heparin   Injectable 7500 Unit(s) SubCutaneous every 8 hours  insulin glargine Injectable (LANTUS) 8 Unit(s) SubCutaneous at bedtime  insulin lispro (HumaLOG) corrective regimen sliding scale   SubCutaneous at bedtime  insulin lispro (HumaLOG) corrective regimen sliding scale   SubCutaneous three times a day before meals  insulin lispro Injectable (HumaLOG) 4 Unit(s) SubCutaneous three times a day before meals  pantoprazole    Tablet 40 milliGRAM(s) Oral every 12 hours  rifAXIMin 550 milliGRAM(s) Oral two times a day  sodium bicarbonate 650 milliGRAM(s) Oral three times a day    MEDICATIONS  (PRN):  acetaminophen   Tablet .. 500 milliGRAM(s) Oral every 6 hours PRN Temp greater or equal to 38C (100.4F), Mild Pain (1 - 3)  dextrose 40% Gel 15 Gram(s) Oral once PRN Blood Glucose LESS THAN 70 milliGRAM(s)/deciliter  glucagon  Injectable 1 milliGRAM(s) IntraMuscular once PRN Glucose LESS THAN 70 milligrams/deciliter  traZODone 50 milliGRAM(s) Oral at bedtime PRN Sleep    Vital Signs Last 24 Hrs  T(C): 37.2 (30 Apr 2020 05:59), Max: 37.2 (30 Apr 2020 05:59)  T(F): 98.9 (30 Apr 2020 05:59), Max: 98.9 (30 Apr 2020 05:59)  HR: 80 (30 Apr 2020 05:59) (73 - 84)  BP: 159/64 (30 Apr 2020 05:59) (143/66 - 159/64)  BP(mean): --  RR: 19 (30 Apr 2020 05:59) (18 - 20)  SpO2: 96% (30 Apr 2020 05:59) (96% - 97%)    04-29 @ 07:01  -  04-30 @ 07:00  --------------------------------------------------------  IN: 0 mL / OUT: 100 mL / NET: -100 mL          LABS:                        7.4    11.26 )-----------( 264      ( 30 Apr 2020 06:25 )             23.5     04-30    136  |  102  |  26<H>  ----------------------------<  110<H>  4.5   |  22  |  2.88<H>    Ca    9.0      30 Apr 2020 06:25  Phos  4.2     04-30  Mg     1.8     04-30    TPro  7.3  /  Alb  3.3  /  TBili  0.2  /  DBili  x   /  AST  17  /  ALT  10  /  AlkPhos  96  04-30    PT/INR - ( 29 Apr 2020 06:17 )   PT: 14.4 sec;   INR: 1.25          PTT - ( 29 Apr 2020 06:17 )  PTT:32.0 sec                  RADIOLOGY & ADDITIONAL STUDIES:

## 2020-04-30 NOTE — PROGRESS NOTE ADULT - ASSESSMENT
59 y/o M with PMH of HTN, DM complicated by neuropathy, EtOH liver cirrhosis, presenting w/ pneumatosis of unknown etiology, s/p sub total colectomy and end ileostomy on 3/31, c/b fever and purulent wound infection (4/9) now s/p paracentesis 4/13 found to be COVID (+). Hgb in am 7.4 from 7.2 low suspicion for acute blood loss. Clinically stable.    - f/o Ileostomy output  - c/w Lomotil  -Rest of care per primary team  -Plan discussed with attending and chief resident

## 2020-04-30 NOTE — PROGRESS NOTE ADULT - ASSESSMENT
61 y/o M with PMH of HTN, DM complicated by neuropathy, IVDU, ETOH liver cirrhosis, who presents to [facility] for fall at home; found to have toxic megacolon and pneumatosis; underwent ex lap and subtotal colectomy, and ileostomy on 3/31. Developed COVID 19 while in the hospital, but subsequent test was positive, patient was transferred to [facility] tele COVID unit without need for supplemental oxygen. Now on RA, on wet to dry dressing, walked with PT, MEG improving, H/H downtrending s/p multiple units of PRBC, following up heme work up to determine if there is a source of bleed vs a hematologic cause for anemia; if H/H stable, then pt stable for dispo to intermediate when set up with SW.

## 2020-05-01 LAB
ALBUMIN SERPL ELPH-MCNC: 3.2 G/DL — LOW (ref 3.3–5)
ALP SERPL-CCNC: 103 U/L — SIGNIFICANT CHANGE UP (ref 40–120)
ALT FLD-CCNC: 13 U/L — SIGNIFICANT CHANGE UP (ref 10–45)
ANION GAP SERPL CALC-SCNC: 13 MMOL/L — SIGNIFICANT CHANGE UP (ref 5–17)
AST SERPL-CCNC: 21 U/L — SIGNIFICANT CHANGE UP (ref 10–40)
BASOPHILS # BLD AUTO: 0.01 K/UL — SIGNIFICANT CHANGE UP (ref 0–0.2)
BASOPHILS NFR BLD AUTO: 0.1 % — SIGNIFICANT CHANGE UP (ref 0–2)
BILIRUB SERPL-MCNC: 0.2 MG/DL — SIGNIFICANT CHANGE UP (ref 0.2–1.2)
BUN SERPL-MCNC: 24 MG/DL — HIGH (ref 7–23)
CALCIUM SERPL-MCNC: 8.8 MG/DL — SIGNIFICANT CHANGE UP (ref 8.4–10.5)
CHLORIDE SERPL-SCNC: 99 MMOL/L — SIGNIFICANT CHANGE UP (ref 96–108)
CO2 SERPL-SCNC: 22 MMOL/L — SIGNIFICANT CHANGE UP (ref 22–31)
CREAT SERPL-MCNC: 2.56 MG/DL — HIGH (ref 0.5–1.3)
CRP SERPL-MCNC: 14.17 MG/DL — HIGH (ref 0–0.4)
D DIMER BLD IA.RAPID-MCNC: 2150 NG/ML DDU — HIGH
EOSINOPHIL # BLD AUTO: 0.31 K/UL — SIGNIFICANT CHANGE UP (ref 0–0.5)
EOSINOPHIL NFR BLD AUTO: 2.8 % — SIGNIFICANT CHANGE UP (ref 0–6)
FERRITIN SERPL-MCNC: 903 NG/ML — HIGH (ref 30–400)
GLUCOSE BLDC GLUCOMTR-MCNC: 100 MG/DL — HIGH (ref 70–99)
GLUCOSE BLDC GLUCOMTR-MCNC: 127 MG/DL — HIGH (ref 70–99)
GLUCOSE BLDC GLUCOMTR-MCNC: 173 MG/DL — HIGH (ref 70–99)
GLUCOSE SERPL-MCNC: 150 MG/DL — HIGH (ref 70–99)
HCT VFR BLD CALC: 25 % — LOW (ref 39–50)
HGB BLD-MCNC: 7.9 G/DL — LOW (ref 13–17)
IMM GRANULOCYTES NFR BLD AUTO: 1.1 % — SIGNIFICANT CHANGE UP (ref 0–1.5)
LYMPHOCYTES # BLD AUTO: 0.69 K/UL — LOW (ref 1–3.3)
LYMPHOCYTES # BLD AUTO: 6.2 % — LOW (ref 13–44)
MAGNESIUM SERPL-MCNC: 1.4 MG/DL — LOW (ref 1.6–2.6)
MCHC RBC-ENTMCNC: 28.4 PG — SIGNIFICANT CHANGE UP (ref 27–34)
MCHC RBC-ENTMCNC: 31.6 GM/DL — LOW (ref 32–36)
MCV RBC AUTO: 89.9 FL — SIGNIFICANT CHANGE UP (ref 80–100)
MONOCYTES # BLD AUTO: 0.95 K/UL — HIGH (ref 0–0.9)
MONOCYTES NFR BLD AUTO: 8.5 % — SIGNIFICANT CHANGE UP (ref 2–14)
NEUTROPHILS # BLD AUTO: 9.05 K/UL — HIGH (ref 1.8–7.4)
NEUTROPHILS NFR BLD AUTO: 81.3 % — HIGH (ref 43–77)
NRBC # BLD: 0 /100 WBCS — SIGNIFICANT CHANGE UP (ref 0–0)
PHOSPHATE SERPL-MCNC: 4.4 MG/DL — SIGNIFICANT CHANGE UP (ref 2.5–4.5)
PLATELET # BLD AUTO: 286 K/UL — SIGNIFICANT CHANGE UP (ref 150–400)
POTASSIUM SERPL-MCNC: 4.8 MMOL/L — SIGNIFICANT CHANGE UP (ref 3.5–5.3)
POTASSIUM SERPL-SCNC: 4.8 MMOL/L — SIGNIFICANT CHANGE UP (ref 3.5–5.3)
PROT SERPL-MCNC: 7.6 G/DL — SIGNIFICANT CHANGE UP (ref 6–8.3)
RBC # BLD: 2.78 M/UL — LOW (ref 4.2–5.8)
RBC # FLD: 14.3 % — SIGNIFICANT CHANGE UP (ref 10.3–14.5)
SODIUM SERPL-SCNC: 134 MMOL/L — LOW (ref 135–145)
WBC # BLD: 11.13 K/UL — HIGH (ref 3.8–10.5)
WBC # FLD AUTO: 11.13 K/UL — HIGH (ref 3.8–10.5)

## 2020-05-01 PROCEDURE — 99232 SBSQ HOSP IP/OBS MODERATE 35: CPT

## 2020-05-01 PROCEDURE — 99231 SBSQ HOSP IP/OBS SF/LOW 25: CPT | Mod: GC

## 2020-05-01 RX ORDER — GABAPENTIN 400 MG/1
200 CAPSULE ORAL EVERY 8 HOURS
Refills: 0 | Status: DISCONTINUED | OUTPATIENT
Start: 2020-05-01 | End: 2020-05-04

## 2020-05-01 RX ORDER — INSULIN GLARGINE 100 [IU]/ML
5 INJECTION, SOLUTION SUBCUTANEOUS AT BEDTIME
Refills: 0 | Status: DISCONTINUED | OUTPATIENT
Start: 2020-05-01 | End: 2020-05-03

## 2020-05-01 RX ORDER — ONDANSETRON 8 MG/1
4 TABLET, FILM COATED ORAL ONCE
Refills: 0 | Status: DISCONTINUED | OUTPATIENT
Start: 2020-05-01 | End: 2020-05-06

## 2020-05-01 RX ORDER — ONDANSETRON 8 MG/1
4 TABLET, FILM COATED ORAL ONCE
Refills: 0 | Status: COMPLETED | OUTPATIENT
Start: 2020-05-01 | End: 2020-05-01

## 2020-05-01 RX ADMIN — Medication 4 UNIT(S): at 17:10

## 2020-05-01 RX ADMIN — HEPARIN SODIUM 7500 UNIT(S): 5000 INJECTION INTRAVENOUS; SUBCUTANEOUS at 20:40

## 2020-05-01 RX ADMIN — Medication 650 MILLIGRAM(S): at 20:40

## 2020-05-01 RX ADMIN — ONDANSETRON 4 MILLIGRAM(S): 8 TABLET, FILM COATED ORAL at 01:50

## 2020-05-01 RX ADMIN — Medication 4: at 21:25

## 2020-05-01 RX ADMIN — PANTOPRAZOLE SODIUM 40 MILLIGRAM(S): 20 TABLET, DELAYED RELEASE ORAL at 16:01

## 2020-05-01 RX ADMIN — Medication 1 TABLET(S): at 10:54

## 2020-05-01 RX ADMIN — Medication 4 UNIT(S): at 08:43

## 2020-05-01 RX ADMIN — AMLODIPINE BESYLATE 10 MILLIGRAM(S): 2.5 TABLET ORAL at 05:56

## 2020-05-01 RX ADMIN — INSULIN GLARGINE 5 UNIT(S): 100 INJECTION, SOLUTION SUBCUTANEOUS at 21:25

## 2020-05-01 RX ADMIN — Medication 650 MILLIGRAM(S): at 13:05

## 2020-05-01 RX ADMIN — HEPARIN SODIUM 7500 UNIT(S): 5000 INJECTION INTRAVENOUS; SUBCUTANEOUS at 05:56

## 2020-05-01 RX ADMIN — GABAPENTIN 200 MILLIGRAM(S): 400 CAPSULE ORAL at 10:53

## 2020-05-01 RX ADMIN — HEPARIN SODIUM 7500 UNIT(S): 5000 INJECTION INTRAVENOUS; SUBCUTANEOUS at 13:05

## 2020-05-01 RX ADMIN — Medication 650 MILLIGRAM(S): at 05:56

## 2020-05-01 RX ADMIN — Medication 4 UNIT(S): at 12:02

## 2020-05-01 RX ADMIN — GABAPENTIN 200 MILLIGRAM(S): 400 CAPSULE ORAL at 20:40

## 2020-05-01 RX ADMIN — CHLORHEXIDINE GLUCONATE 1 APPLICATION(S): 213 SOLUTION TOPICAL at 10:51

## 2020-05-01 RX ADMIN — PANTOPRAZOLE SODIUM 40 MILLIGRAM(S): 20 TABLET, DELAYED RELEASE ORAL at 05:56

## 2020-05-01 RX ADMIN — Medication 50 MILLIGRAM(S): at 20:39

## 2020-05-01 RX ADMIN — ATORVASTATIN CALCIUM 10 MILLIGRAM(S): 80 TABLET, FILM COATED ORAL at 20:39

## 2020-05-01 RX ADMIN — Medication 2: at 12:02

## 2020-05-01 RX ADMIN — Medication 500 MILLIGRAM(S): at 10:08

## 2020-05-01 NOTE — PROGRESS NOTE ADULT - PROBLEM SELECTOR PLAN 2
Decompensated cirrhosis likely 2/2 alcohol use, c/b ascites, hepatic encephalopathy on admission (resolved) sbp ruled out  - CT abd/pelvis with persistent large ascites and peritonitis; bilateral pleural effusions and pericardial effusion  - MELD-Na score 28  - s/p paracentesis on 4/10 with negative cytology but not sent for cell count/culture, however was on zosyn for 2 weeks so GI with no concerns for SBP   - repeat paracentesis 4/19, but unable to aspirate enough fluid?   - SAAG 1.4 consistent with portal hypertension as etiology of ascites  - continue rifaximin 550mg BID (changed from lactulose) for hepatic encephalopathy   - holding lactulose for high ostomy output  - unclear if pt with varices- pt refuses gi workup **today seems amenable to GI work up- monitoring hb and active T+S transfuse if needed  - Hgb slowly dropping, however ileostomy without melanotic stool; light brown/green soft stool  - pt refusing video capsule endoscopy this admission; explained risks of not undergoing procedure and pt understands and repeats back risks of refusing procedure.   - pt needs screening for HCC outpatient and follow up with hepatology outpatient RPT PARACENTISIS 5/2 for distention f/u with results  Decompensated cirrhosis likely 2/2 alcohol use, c/b ascites, hepatic encephalopathy on admission (resolved) sbp ruled out  - CT abd/pelvis with persistent large ascites and peritonitis; bilateral pleural effusions and pericardial effusion  - MELD-Na score 28  - s/p paracentesis on 4/10 with negative cytology but not sent for cell count/culture, however was on zosyn for 2 weeks so GI with no concerns for SBP   - repeat paracentesis 4/19, but unable to aspirate enough fluid?   - SAAG 1.4 consistent with portal hypertension as etiology of ascites  - continue rifaximin 550mg BID (changed from lactulose) for hepatic encephalopathy   - holding lactulose for high ostomy output  - unclear if pt with varices- pt refuses gi workup **today seems amenable to GI work up- monitoring hb and active T+S transfuse if needed  - Hgb slowly dropping, however ileostomy without melanotic stool; light brown/green soft stool  - pt refusing video capsule endoscopy this admission; explained risks of not undergoing procedure and pt understands and repeats back risks of refusing procedure.   - pt needs screening for HCC outpatient and follow up with hepatology outpatient

## 2020-05-01 NOTE — PROGRESS NOTE ADULT - SUBJECTIVE AND OBJECTIVE BOX
INTERVAL HPI/OVERNIGHT EVENTS:      Patient is a 60y old  Male who presents with a chief complaint of Colectomy (19 Apr 2020 10:46)  Spoke to the patient via phone. Very frustrated at state of health and discharge to shelter. He does not have any diabetes supplies/insulin at home. He is experiencing nausea and pain. He has a poor appetite. He normally takes gabapentin 1200mg QID and tylenol 1000mg QID.  Monitoring renal function  Patient was having some suicidal ideations - adjustment disorder with depressive mood per psych-- has cleared for discharge Denied any active thoughts today.  Saturating well on RA  FSg and insulin administration reviewed    MEDICATIONS  (STANDING):  amLODIPine   Tablet 10 milliGRAM(s) Oral daily  atorvastatin 10 milliGRAM(s) Oral at bedtime  chlorhexidine 2% Cloths 1 Application(s) Topical daily  dextrose 5%. 1000 milliLiter(s) (50 mL/Hr) IV Continuous <Continuous>  dextrose 50% Injectable 12.5 Gram(s) IV Push once  dextrose 50% Injectable 25 Gram(s) IV Push once  dextrose 50% Injectable 25 Gram(s) IV Push once  diphenoxylate/atropine 1 Tablet(s) Oral daily  gabapentin 200 milliGRAM(s) Oral daily  heparin   Injectable 7500 Unit(s) SubCutaneous every 8 hours  insulin glargine Injectable (LANTUS) 8 Unit(s) SubCutaneous at bedtime  insulin lispro (HumaLOG) corrective regimen sliding scale   SubCutaneous at bedtime  insulin lispro (HumaLOG) corrective regimen sliding scale   SubCutaneous three times a day before meals  insulin lispro Injectable (HumaLOG) 4 Unit(s) SubCutaneous three times a day before meals  ondansetron Injectable 4 milliGRAM(s) IV Push once  pantoprazole    Tablet 40 milliGRAM(s) Oral every 12 hours  rifAXIMin 550 milliGRAM(s) Oral two times a day  sodium bicarbonate 650 milliGRAM(s) Oral three times a day    MEDICATIONS  (PRN):  acetaminophen   Tablet .. 500 milliGRAM(s) Oral every 6 hours PRN Temp greater or equal to 38C (100.4F), Mild Pain (1 - 3)  dextrose 40% Gel 15 Gram(s) Oral once PRN Blood Glucose LESS THAN 70 milliGRAM(s)/deciliter  glucagon  Injectable 1 milliGRAM(s) IntraMuscular once PRN Glucose LESS THAN 70 milligrams/deciliter  traZODone 50 milliGRAM(s) Oral at bedtime PRN Sleep      PHYSICAL EXAM  Vital Signs Last 24 Hrs  T(C): 36.5 (01 May 2020 05:33), Max: 36.7 (30 Apr 2020 14:56)  T(F): 97.7 (01 May 2020 05:33), Max: 98.1 (30 Apr 2020 14:56)  HR: 72 (01 May 2020 05:33) (72 - 100)  BP: 145/68 (01 May 2020 05:33) (145/68 - 152/65)  BP(mean): --  RR: 20 (01 May 2020 09:14) (19 - 20)  SpO2: 96% (01 May 2020 09:14) (96% - 97%)      Due to the nature of this patient’s COVID-19 isolation status (either confirmed or suspected), this note was prepared without a bedside physical examination to prevent spread of infection and to conserve personal protective equipment during this nationwide pandemic. If possible, direct patient communication occurred via electronic measures or telephone conversation. Examination highlights were provided by a bedside nurse wearing personal protective equipment and review of pertinent medical records. Objective data (vital signs, urine output, lab results, imaging studies, medications, etc) were reviewed in detail. Face to face visits and physical examination have been limited only to patients for whom it is required for medical decision making.    LABS:                        7.9    11.13 )-----------( 286      ( 01 May 2020 10:27 )             25.0     05-01    134<L>  |  99  |  24<H>  ----------------------------<  150<H>  4.8   |  22  |  2.56<H>    Ca    8.8      01 May 2020 10:27  Phos  4.4     05-01  Mg     1.4     05-01    TPro  7.6  /  Alb  3.2<L>  /  TBili  0.2  /  DBili  x   /  AST  21  /  ALT  13  /  AlkPhos  103  05-01        Thyroid Stimulating Hormone, Serum: 3.325 uIU/mL (04-29 @ 06:17)  Thyroid Stimulating Hormone, Serum: 2.813 uIU/mL (04-14 @ 07:28)  Thyroid Stimulating Hormone, Serum: 2.877 uIU/mL (04-14 @ 00:34)      HbA1C: 6.9 % (04-05 @ 06:59)  7.6 % (03-31 @ 05:58)    CAPILLARY BLOOD GLUCOSE      POCT Blood Glucose.: 173 mg/dL (01 May 2020 11:49)  POCT Blood Glucose.: 100 mg/dL (01 May 2020 08:15)  POCT Blood Glucose.: 168 mg/dL (30 Apr 2020 22:09)  POCT Blood Glucose.: 112 mg/dL (30 Apr 2020 17:01)    A/P 60yMale with hx of DM now with COVID, worsening renal function, moderate hyperglycemia.     1.  DM: type 2,   Please continue Lantus 8 units at bedtime  PLease continue Lispro 4 units tid with meals.   Continue lispro moderate dose scale with meals and at bedtime.   Continue consistent carb diet  FSG Goal 100-180    2.  Hyperlipidemia - goal LDL < 70  - on lipitor 10 mg once daily    3.  Obesity - outpatient medical management.      4.  Hypothyroidism - Pt reportedly didn't have medication prior to hospitalization. TSH on admission 2.8. levothyroxine 50mcg discontinued on 4/29 to assess necessity as there is no evidence of autoimmune hypothyroidism at this time. Repeat TSH 3.32 and free T4 1.03 (4/29). Please recheck free T4 on monday 5/4.     Discharge: No insulin. Start glimepiride 2mg or glipizide 4mg (whichever is most cost effective). Will provide glucometer. Will make f/u appt at [facility] [place]     Case seen and discussed with  and updated the primary team. INTERVAL HPI/OVERNIGHT EVENTS:      Patient is a 60y old  Male who presents with a chief complaint of Colectomy (19 Apr 2020 10:46)  Spoke to the patient via phone. Very frustrated at state of health and discharge to shelter. He does not have any diabetes supplies/insulin at home. He is experiencing nausea and pain. He has a poor appetite. He normally takes gabapentin 1200mg QID and tylenol 1000mg QID.  Monitoring renal function  Patient was having some suicidal ideations - adjustment disorder with depressive mood per psych-- has cleared for discharge Denied any active thoughts today.  Saturating well on RA  FSg and insulin administration reviewed    MEDICATIONS  (STANDING):  amLODIPine   Tablet 10 milliGRAM(s) Oral daily  atorvastatin 10 milliGRAM(s) Oral at bedtime  chlorhexidine 2% Cloths 1 Application(s) Topical daily  dextrose 5%. 1000 milliLiter(s) (50 mL/Hr) IV Continuous <Continuous>  dextrose 50% Injectable 12.5 Gram(s) IV Push once  dextrose 50% Injectable 25 Gram(s) IV Push once  dextrose 50% Injectable 25 Gram(s) IV Push once  diphenoxylate/atropine 1 Tablet(s) Oral daily  gabapentin 200 milliGRAM(s) Oral daily  heparin   Injectable 7500 Unit(s) SubCutaneous every 8 hours  insulin glargine Injectable (LANTUS) 8 Unit(s) SubCutaneous at bedtime  insulin lispro (HumaLOG) corrective regimen sliding scale   SubCutaneous at bedtime  insulin lispro (HumaLOG) corrective regimen sliding scale   SubCutaneous three times a day before meals  insulin lispro Injectable (HumaLOG) 4 Unit(s) SubCutaneous three times a day before meals  ondansetron Injectable 4 milliGRAM(s) IV Push once  pantoprazole    Tablet 40 milliGRAM(s) Oral every 12 hours  rifAXIMin 550 milliGRAM(s) Oral two times a day  sodium bicarbonate 650 milliGRAM(s) Oral three times a day    MEDICATIONS  (PRN):  acetaminophen   Tablet .. 500 milliGRAM(s) Oral every 6 hours PRN Temp greater or equal to 38C (100.4F), Mild Pain (1 - 3)  dextrose 40% Gel 15 Gram(s) Oral once PRN Blood Glucose LESS THAN 70 milliGRAM(s)/deciliter  glucagon  Injectable 1 milliGRAM(s) IntraMuscular once PRN Glucose LESS THAN 70 milligrams/deciliter  traZODone 50 milliGRAM(s) Oral at bedtime PRN Sleep      PHYSICAL EXAM  Vital Signs Last 24 Hrs  T(C): 36.5 (01 May 2020 05:33), Max: 36.7 (30 Apr 2020 14:56)  T(F): 97.7 (01 May 2020 05:33), Max: 98.1 (30 Apr 2020 14:56)  HR: 72 (01 May 2020 05:33) (72 - 100)  BP: 145/68 (01 May 2020 05:33) (145/68 - 152/65)  BP(mean): --  RR: 20 (01 May 2020 09:14) (19 - 20)  SpO2: 96% (01 May 2020 09:14) (96% - 97%)      Due to the nature of this patient’s COVID-19 isolation status (either confirmed or suspected), this note was prepared without a bedside physical examination to prevent spread of infection and to conserve personal protective equipment during this nationwide pandemic. If possible, direct patient communication occurred via electronic measures or telephone conversation. Examination highlights were provided by a bedside nurse wearing personal protective equipment and review of pertinent medical records. Objective data (vital signs, urine output, lab results, imaging studies, medications, etc) were reviewed in detail. Face to face visits and physical examination have been limited only to patients for whom it is required for medical decision making.    LABS:                        7.9    11.13 )-----------( 286      ( 01 May 2020 10:27 )             25.0     05-01    134<L>  |  99  |  24<H>  ----------------------------<  150<H>  4.8   |  22  |  2.56<H>    Ca    8.8      01 May 2020 10:27  Phos  4.4     05-01  Mg     1.4     05-01    TPro  7.6  /  Alb  3.2<L>  /  TBili  0.2  /  DBili  x   /  AST  21  /  ALT  13  /  AlkPhos  103  05-01        Thyroid Stimulating Hormone, Serum: 3.325 uIU/mL (04-29 @ 06:17)  Thyroid Stimulating Hormone, Serum: 2.813 uIU/mL (04-14 @ 07:28)  Thyroid Stimulating Hormone, Serum: 2.877 uIU/mL (04-14 @ 00:34)      HbA1C: 6.9 % (04-05 @ 06:59)  7.6 % (03-31 @ 05:58)    CAPILLARY BLOOD GLUCOSE      POCT Blood Glucose.: 173 mg/dL (01 May 2020 11:49)  POCT Blood Glucose.: 100 mg/dL (01 May 2020 08:15)  POCT Blood Glucose.: 168 mg/dL (30 Apr 2020 22:09)  POCT Blood Glucose.: 112 mg/dL (30 Apr 2020 17:01)    A/P 60yMale with hx of DM now with COVID, worsening renal function, moderate hyperglycemia.     1.  DM: type 2,   Please decrease Lantus to 5 units at bedtime  PLease continue Lispro 4 units tid with meals.   Continue lispro moderate dose scale with meals and at bedtime.   Continue consistent carb diet  FSG Goal 100-180  Please increase gabapentin 200mg every 8 hours    2.  Hyperlipidemia - goal LDL < 70  - on lipitor 10 mg once daily    3.  Obesity - outpatient medical management.      4.  Hypothyroidism - Pt reportedly didn't have medication prior to hospitalization. TSH on admission 2.8. levothyroxine 50mcg discontinued on 4/29 to assess necessity as there is no evidence of autoimmune hypothyroidism at this time. Repeat TSH 3.32 and free T4 1.03 (4/29). Please recheck free T4 on monday 5/4.     Discharge: No insulin. Start glipizide 2.5mg BID. Will provide glucometer. Will make f/u appt at [facility] [place]     Case seen and discussed with  and updated the primary team.

## 2020-05-01 NOTE — PROGRESS NOTE ADULT - SUBJECTIVE AND OBJECTIVE BOX
INTERVAL HPI/OVERNIGHT EVENTS: H/H stable 7.4 (surg says no bleed), MEG improving (as per nephor consult), pt is stable for d/c but awaiting shelter placement with wound care services     SUBJECTIVE: Examined with chief resident at the bedside, resting comfortably. This morning he feels nauseous no emesis. Denies cp, sob. No acute complaints.     amLODIPine   Tablet 10 milliGRAM(s) Oral daily  heparin   Injectable 7500 Unit(s) SubCutaneous every 8 hours  rifAXIMin 550 milliGRAM(s) Oral two times a day      Vital Signs Last 24 Hrs  T(C): 36.5 (01 May 2020 05:33), Max: 36.7 (30 Apr 2020 14:56)  T(F): 97.7 (01 May 2020 05:33), Max: 98.1 (30 Apr 2020 14:56)  HR: 72 (01 May 2020 05:33) (72 - 100)  BP: 145/68 (01 May 2020 05:33) (145/68 - 152/65)  BP(mean): --  RR: 19 (01 May 2020 05:33) (19 - 20)  SpO2: 97% (01 May 2020 05:33) (97% - 97%)  I&O's Detail    30 Apr 2020 07:01  -  01 May 2020 07:00  --------------------------------------------------------  IN:  Total IN: 0 mL    OUT:    Ileostomy: 650 mL  Total OUT: 650 mL    Total NET: -650 mL          Physical Exam  General: NAD, resting comfortably in bed  C/V: NSR  Pulm: Nonlabored breathing, no respiratory distress  Abd: soft, non-tender, moderately distended. Ileostomy with liquid stool, midline wound with granulation, no purulence noted, dressing changed   Extrem: WWP, no edema, no edema        LABS:                        7.4    11.26 )-----------( 264      ( 30 Apr 2020 06:25 )             23.5     04-30    136  |  102  |  26<H>  ----------------------------<  110<H>  4.5   |  22  |  2.88<H>    Ca    9.0      30 Apr 2020 06:25  Phos  4.2     04-30  Mg     1.8     04-30    TPro  7.3  /  Alb  3.3  /  TBili  0.2  /  DBili  x   /  AST  17  /  ALT  10  /  AlkPhos  96  04-30          RADIOLOGY & ADDITIONAL STUDIES:

## 2020-05-01 NOTE — PROGRESS NOTE ADULT - PROBLEM SELECTOR PLAN 6
Type 2 DM, endocrinology following   - continue lantus 8U qhs   - continue lispro 4U TID premeals  - moderate SSI   - FSG goal 140-180    #hypothyroidism   - TSH 2.813; TPO and antithyroid Abs neg   - continue levothyroxine 50mcg   - appreciate endocrinology recs    #neuropathy   - per pt, pt takes gabapentin 1200mg TID; would like to restart (not getting pain meds is part of reason he is having SI). explained that due to renal failure, we were holding   - CrCl 25-30; restarted gabapentin 200mg daily 4/26 Type 2 DM, endocrinology following   - continue lantus 8U qhs   - continue lispro 4U TID pre-meals  - moderate SSI   - FSG goal 140-180    #hypothyroidism   - TSH 2.813; TPO and antithyroid Abs neg   - continue levothyroxine 50mcg   - appreciate endocrinology recs    #neuropathy   - per pt, pt takes gabapentin 1200mg TID; would like to restart (not getting pain meds is part of reason he is having SI). explained that due to renal failure, we were holding   - CrCl 25-30; restarted gabapentin 200mg daily 4/26

## 2020-05-01 NOTE — PROGRESS NOTE ADULT - ASSESSMENT
59 y/o M with PMH of HTN, DM complicated by neuropathy, IVDU, ETOH liver cirrhosis, who presents to [facility] for fall at home; found to have toxic megacolon and pneumatosis; underwent ex lap and subtotal colectomy, and ileostomy on 3/31. Developed COVID 19 while in the hospital, but subsequent test was positive, patient was transferred to [facility] tele COVID unit without need for supplemental oxygen. Now on RA, on wet to dry dressing, walked with PT, MEG improving, H/H downtrending s/p multiple units of PRBC, following up heme work up to determine if there is a source of bleed vs a hematologic cause for anemia; if H/H stable, then pt stable for dispo to retirement when set up with . Pt currently needs daily wound/ostomy checks. Shelter [facility] only has 1 x a week wound check available

## 2020-05-01 NOTE — PROGRESS NOTE ADULT - PROBLEM SELECTOR PLAN 7
- continue home dose of amlodipine at 10mg daily (10mg starting 4/24)  - restart home pravastatin 40mg daily; therapeutic interchange with atorvastatin 10mg  - BP 5/1 is 145/68  -holding home hctz and lisinopril given MEG/ ARF

## 2020-05-01 NOTE — PROGRESS NOTE ADULT - PROBLEM SELECTOR PLAN 4
Nonoliguric MEG 2/2 likey ATN, per nephrology. Per renal, likely combination of pre-renal, intrinsic cause 2/2 COVID-19 and contrast exposure on 4/10 vs vancomycin toxicity. IMPROVING seen by nephro- creatine improved from 3.24 to 2.88  - previously on albumin, octreotide, and midodrine for albumin trial for possible hepatorenal syndrome, but without improvement in kidney function  - FeNa 2.0% and FeUrea 63% intrinsic disease  - Cr downtrending, renal says no indication of HD; follow up outpatient  - net even fluid balance   - renal recommending gentle IV hydration if high GI output  - avoid ACE/ARB/NSAIDs/metformin/contrast   - strict I/Os   - appreciate renal recs    #hyponatremia   RESOLVED     #metabolic acidosis   - sodium bicarbonate tabs TID per renal Nonoliguric MEG 2/2 likely ATN, per nephrology. Per renal, likely combination of pre-renal, intrinsic cause 2/2 COVID-19 and contrast exposure on 4/10 vs vancomycin toxicity. IMPROVING seen by nephro- creatine improved from 3.24 to 2.88  - previously on albumin, octreotide, and midodrine for albumin trial for possible hepatorenal syndrome, but without improvement in kidney function  - FeNa 2.0% and FeUrea 63% intrinsic disease  - Cr downtrending, renal says no indication of HD; follow up outpatient  - net even fluid balance   - renal recommending gentle IV hydration if high GI output  - avoid ACE/ARB/NSAIDs/metformin/contrast   - strict I/Os   - appreciate renal recs    #hyponatremia   RESOLVED     #metabolic acidosis   - sodium bicarbonate tabs TID per renal

## 2020-05-01 NOTE — PROGRESS NOTE ADULT - SUBJECTIVE AND OBJECTIVE BOX
Pt seen and examined at bedside.    PERTINENT REVIEW OF SYSTEMS:  per IM progress note    Allergies    No Known Allergies    Intolerances      MEDICATIONS:  MEDICATIONS  (STANDING):  amLODIPine   Tablet 10 milliGRAM(s) Oral daily  atorvastatin 10 milliGRAM(s) Oral at bedtime  chlorhexidine 2% Cloths 1 Application(s) Topical daily  dextrose 5%. 1000 milliLiter(s) (50 mL/Hr) IV Continuous <Continuous>  dextrose 50% Injectable 12.5 Gram(s) IV Push once  dextrose 50% Injectable 25 Gram(s) IV Push once  dextrose 50% Injectable 25 Gram(s) IV Push once  diphenoxylate/atropine 1 Tablet(s) Oral daily  gabapentin 200 milliGRAM(s) Oral daily  heparin   Injectable 7500 Unit(s) SubCutaneous every 8 hours  insulin glargine Injectable (LANTUS) 8 Unit(s) SubCutaneous at bedtime  insulin lispro (HumaLOG) corrective regimen sliding scale   SubCutaneous at bedtime  insulin lispro (HumaLOG) corrective regimen sliding scale   SubCutaneous three times a day before meals  insulin lispro Injectable (HumaLOG) 4 Unit(s) SubCutaneous three times a day before meals  ondansetron Injectable 4 milliGRAM(s) IV Push once  pantoprazole    Tablet 40 milliGRAM(s) Oral every 12 hours  rifAXIMin 550 milliGRAM(s) Oral two times a day  sodium bicarbonate 650 milliGRAM(s) Oral three times a day    MEDICATIONS  (PRN):  acetaminophen   Tablet .. 500 milliGRAM(s) Oral every 6 hours PRN Temp greater or equal to 38C (100.4F), Mild Pain (1 - 3)  dextrose 40% Gel 15 Gram(s) Oral once PRN Blood Glucose LESS THAN 70 milliGRAM(s)/deciliter  glucagon  Injectable 1 milliGRAM(s) IntraMuscular once PRN Glucose LESS THAN 70 milligrams/deciliter  traZODone 50 milliGRAM(s) Oral at bedtime PRN Sleep    Vital Signs Last 24 Hrs  T(C): 36.7 (01 May 2020 12:45), Max: 36.7 (01 May 2020 12:45)  T(F): 98 (01 May 2020 12:45), Max: 98 (01 May 2020 12:45)  HR: 76 (01 May 2020 12:45) (72 - 100)  BP: 155/71 (01 May 2020 12:45) (145/68 - 155/71)  BP(mean): --  RR: 20 (01 May 2020 12:45) (19 - 20)  SpO2: 98% (01 May 2020 12:45) (96% - 98%)    04-30 @ 07:01  -  05-01 @ 07:00  --------------------------------------------------------  IN: 0 mL / OUT: 650 mL / NET: -650 mL          LABS:                        7.9    11.13 )-----------( 286      ( 01 May 2020 10:27 )             25.0     05-01    134<L>  |  99  |  24<H>  ----------------------------<  150<H>  4.8   |  22  |  2.56<H>    Ca    8.8      01 May 2020 10:27  Phos  4.4     05-01  Mg     1.4     05-01    TPro  7.6  /  Alb  3.2<L>  /  TBili  0.2  /  DBili  x   /  AST  21  /  ALT  13  /  AlkPhos  103  05-01                      RADIOLOGY & ADDITIONAL STUDIES:

## 2020-05-01 NOTE — PROGRESS NOTE ADULT - ASSESSMENT
61 y/o M with PMH of HTN, DM complicated by neuropathy, EtOH liver cirrhosis, presenting w/ pneumatosis of unknown etiology, s/p sub total colectomy and end ileostomy on 3/31, c/b fever and purulent wound infection (4/9), ascites now s/p paracentesis 4/13. Course further complicated by worsening MEG,  anemia, and COVID+     # Anemia: VSS   - Hemoglobin relatively stable, based on lab trend baseline hb range 7-8, vss  - no evidence of active/overt GI bleeding - hematemesis, coffee grounds, melena, BRB from ileostomy  - PPI OD  - monitor for melena/hematochezia/hematemesis   -currently no bleeding from ostomy and hb stable, will hold off on endoscopic evaluation for now  -GI will sign off, please contact as needed    # Decompensated cirrhosis- likely 2/2 alc use, ascites +, HE +, VH -  - MELD-Na of 28  - Ascites: s/p para on 4/10, 140 cc sent. Cytology negative, but not sent for cell count/culture. Patient has been on zosyn for 2 weeks so doubt SBP., repeat attempt made on 4/19 but unable to aspirate?   - HE: on Rifaximin.  - Varices: Unknown. H/h relatively stable without signs of active bleeding or melena in ostomy.   - HCC: no lesion seen on CT, recommend screening q6 months.      Discussed with attending Dr Frankel

## 2020-05-01 NOTE — PROGRESS NOTE ADULT - SUBJECTIVE AND OBJECTIVE BOX
SUBJECTIVE: Patient seen and examined at bedside. Pt is laying in bed comfortably and denies SOB, CP, ABD Pain, Fever, Chills, N/V. Pt has good appetite.    OBJECTIVE:    VITAL SIGNS:  ICU Vital Signs Last 24 Hrs  T(C): 36.5 (01 May 2020 05:33), Max: 36.7 (30 Apr 2020 14:56)  T(F): 97.7 (01 May 2020 05:33), Max: 98.1 (30 Apr 2020 14:56)  HR: 72 (01 May 2020 05:33) (72 - 100)  BP: 145/68 (01 May 2020 05:33) (145/68 - 152/65)  BP(mean): --  ABP: --  ABP(mean): --  RR: 20 (01 May 2020 09:14) (19 - 20)  SpO2: 96% (01 May 2020 09:14) (96% - 97%)        04-30 @ 07:01  -  05-01 @ 07:00  --------------------------------------------------------  IN: 0 mL / OUT: 650 mL / NET: -650 mL      CAPILLARY BLOOD GLUCOSE      POCT Blood Glucose.: 100 mg/dL (01 May 2020 08:15)      PHYSICAL EXAM:    General: NAD  HEENT: NC/AT; PERRL, clear conjunctiva  Neck: supple  Respiratory: w/o respiratory distress and accessory muscle use  Cardiovascular: +S1/S2; RRR  Abdomen: soft, NT/ND; + ostomy with soft, dark w/o blood. Changed @ night 4/30  Extremities: WWP, 2+ peripheral pulses b/l; no LE edema  Skin: normal color and turgor; no rash    MEDICATIONS:  MEDICATIONS  (STANDING):  amLODIPine   Tablet 10 milliGRAM(s) Oral daily  atorvastatin 10 milliGRAM(s) Oral at bedtime  chlorhexidine 2% Cloths 1 Application(s) Topical daily  dextrose 5%. 1000 milliLiter(s) (50 mL/Hr) IV Continuous <Continuous>  dextrose 50% Injectable 12.5 Gram(s) IV Push once  dextrose 50% Injectable 25 Gram(s) IV Push once  dextrose 50% Injectable 25 Gram(s) IV Push once  diphenoxylate/atropine 1 Tablet(s) Oral daily  gabapentin 200 milliGRAM(s) Oral daily  heparin   Injectable 7500 Unit(s) SubCutaneous every 8 hours  insulin glargine Injectable (LANTUS) 8 Unit(s) SubCutaneous at bedtime  insulin lispro (HumaLOG) corrective regimen sliding scale   SubCutaneous at bedtime  insulin lispro (HumaLOG) corrective regimen sliding scale   SubCutaneous three times a day before meals  insulin lispro Injectable (HumaLOG) 4 Unit(s) SubCutaneous three times a day before meals  pantoprazole    Tablet 40 milliGRAM(s) Oral every 12 hours  rifAXIMin 550 milliGRAM(s) Oral two times a day  sodium bicarbonate 650 milliGRAM(s) Oral three times a day    MEDICATIONS  (PRN):  acetaminophen   Tablet .. 500 milliGRAM(s) Oral every 6 hours PRN Temp greater or equal to 38C (100.4F), Mild Pain (1 - 3)  dextrose 40% Gel 15 Gram(s) Oral once PRN Blood Glucose LESS THAN 70 milliGRAM(s)/deciliter  glucagon  Injectable 1 milliGRAM(s) IntraMuscular once PRN Glucose LESS THAN 70 milligrams/deciliter  traZODone 50 milliGRAM(s) Oral at bedtime PRN Sleep      LABS:                        7.4    11.26 )-----------( 264      ( 30 Apr 2020 06:25 )             23.5     04-30    136  |  102  |  26<H>  ----------------------------<  110<H>  4.5   |  22  |  2.88<H>    Ca    9.0      30 Apr 2020 06:25  Phos  4.2     04-30  Mg     1.8     04-30    TPro  7.3  /  Alb  3.3  /  TBili  0.2  /  DBili  x   /  AST  17  /  ALT  10  /  AlkPhos  96  04-30          Procalcitonin:   D-dimer: D-Dimer Assay, Quantitative: 2162 ng/mL DDU (04-30-20 @ 06:25)  D-Dimer Assay, Quantitative: 2277 ng/mL DDU (04-29-20 @ 06:17)    ESR:   CRP: C-Reactive Protein, Serum: 11.52 mg/dL (04-30-20 @ 06:25)  C-Reactive Protein, Serum: 12.39 mg/dL (04-29-20 @ 06:17)    LDH:   Ferritin: Ferritin, Serum: 853 ng/mL (04-30-20 @ 06:25)  Ferritin, Serum: 815 ng/mL (04-29-20 @ 06:17)  Ferritin, Serum: 887 ng/mL (04-28-20 @ 16:48)      COVID Labs  Full T cell subset:   G6PD: Glucose-6-Phosphate Dehydrogenase: 20.6 U/g Hgb (04-14-20 @ 11:02)        RADIOLOGY & ADDITIONAL TESTS: Reviewed. No new imaging oredered

## 2020-05-01 NOTE — PROGRESS NOTE ADULT - ASSESSMENT
61 y/o M with PMH of HTN, DM complicated by neuropathy, EtOH liver cirrhosis, presenting w/ pneumatosis of unknown etiology, s/p sub total colectomy and end ileostomy on 3/31, c/b fever and purulent wound infection (4/9) now s/p paracentesis 4/13 found to be COVID (+). Clinically stable. 59 y/o M with PMH of HTN, DM complicated by neuropathy, EtOH liver cirrhosis, presenting w/ pneumatosis of unknown etiology, s/p sub total colectomy and end ileostomy on 3/31, c/b fever and purulent wound infection (4/9) now s/p paracentesis 4/13 found to be COVID (+). Clinically stable.    -Zofran for Nausea   -f/o Ileostomy output  - c/w Lomotil  - Rest of care per primary team  - Plan discussed with attending and chief resident

## 2020-05-01 NOTE — PROGRESS NOTE ADULT - PROBLEM SELECTOR PLAN 5
Normocytic anemia. Likely 2/2 anemia of chronic disease and kidney dysfunction vs acute blood loss. Hgb 7.4 4/30, ileostomy output with brown stool   -monitor hb, transfuse <7  - s/p 4U pRBCs total this admission as of 4/24  - continue po pantoprazole 40mg BID   - monitor for signs of bleeding   - GI following and planned for video capsule endoscopy this admission, but pt refused

## 2020-05-02 DIAGNOSIS — N17.9 ACUTE KIDNEY FAILURE, UNSPECIFIED: ICD-10-CM

## 2020-05-02 LAB
ALBUMIN SERPL ELPH-MCNC: 3.2 G/DL — LOW (ref 3.3–5)
ALP SERPL-CCNC: 98 U/L — SIGNIFICANT CHANGE UP (ref 40–120)
ALT FLD-CCNC: 13 U/L — SIGNIFICANT CHANGE UP (ref 10–45)
ANION GAP SERPL CALC-SCNC: 13 MMOL/L — SIGNIFICANT CHANGE UP (ref 5–17)
AST SERPL-CCNC: 18 U/L — SIGNIFICANT CHANGE UP (ref 10–40)
BASOPHILS # BLD AUTO: 0.01 K/UL — SIGNIFICANT CHANGE UP (ref 0–0.2)
BASOPHILS NFR BLD AUTO: 0.1 % — SIGNIFICANT CHANGE UP (ref 0–2)
BILIRUB SERPL-MCNC: 0.2 MG/DL — SIGNIFICANT CHANGE UP (ref 0.2–1.2)
BUN SERPL-MCNC: 19 MG/DL — SIGNIFICANT CHANGE UP (ref 7–23)
CALCIUM SERPL-MCNC: 8.7 MG/DL — SIGNIFICANT CHANGE UP (ref 8.4–10.5)
CHLORIDE SERPL-SCNC: 101 MMOL/L — SIGNIFICANT CHANGE UP (ref 96–108)
CO2 SERPL-SCNC: 23 MMOL/L — SIGNIFICANT CHANGE UP (ref 22–31)
CREAT SERPL-MCNC: 2.46 MG/DL — HIGH (ref 0.5–1.3)
CRP SERPL-MCNC: 11.73 MG/DL — HIGH (ref 0–0.4)
D DIMER BLD IA.RAPID-MCNC: 1987 NG/ML DDU — HIGH
EOSINOPHIL # BLD AUTO: 0.26 K/UL — SIGNIFICANT CHANGE UP (ref 0–0.5)
EOSINOPHIL NFR BLD AUTO: 3.2 % — SIGNIFICANT CHANGE UP (ref 0–6)
FERRITIN SERPL-MCNC: 881 NG/ML — HIGH (ref 30–400)
GLUCOSE BLDC GLUCOMTR-MCNC: 156 MG/DL — HIGH (ref 70–99)
GLUCOSE BLDC GLUCOMTR-MCNC: 169 MG/DL — HIGH (ref 70–99)
GLUCOSE BLDC GLUCOMTR-MCNC: 183 MG/DL — HIGH (ref 70–99)
GLUCOSE BLDC GLUCOMTR-MCNC: 229 MG/DL — HIGH (ref 70–99)
GLUCOSE SERPL-MCNC: 171 MG/DL — HIGH (ref 70–99)
HCT VFR BLD CALC: 22.8 % — LOW (ref 39–50)
HGB BLD-MCNC: 7.2 G/DL — LOW (ref 13–17)
IMM GRANULOCYTES NFR BLD AUTO: 1.2 % — SIGNIFICANT CHANGE UP (ref 0–1.5)
LYMPHOCYTES # BLD AUTO: 0.69 K/UL — LOW (ref 1–3.3)
LYMPHOCYTES # BLD AUTO: 8.5 % — LOW (ref 13–44)
MAGNESIUM SERPL-MCNC: 1.4 MG/DL — LOW (ref 1.6–2.6)
MCHC RBC-ENTMCNC: 28.3 PG — SIGNIFICANT CHANGE UP (ref 27–34)
MCHC RBC-ENTMCNC: 31.6 GM/DL — LOW (ref 32–36)
MCV RBC AUTO: 89.8 FL — SIGNIFICANT CHANGE UP (ref 80–100)
MONOCYTES # BLD AUTO: 0.78 K/UL — SIGNIFICANT CHANGE UP (ref 0–0.9)
MONOCYTES NFR BLD AUTO: 9.6 % — SIGNIFICANT CHANGE UP (ref 2–14)
NEUTROPHILS # BLD AUTO: 6.32 K/UL — SIGNIFICANT CHANGE UP (ref 1.8–7.4)
NEUTROPHILS NFR BLD AUTO: 77.4 % — HIGH (ref 43–77)
NRBC # BLD: 0 /100 WBCS — SIGNIFICANT CHANGE UP (ref 0–0)
PHOSPHATE SERPL-MCNC: 4.2 MG/DL — SIGNIFICANT CHANGE UP (ref 2.5–4.5)
PLATELET # BLD AUTO: 259 K/UL — SIGNIFICANT CHANGE UP (ref 150–400)
POTASSIUM SERPL-MCNC: 4.7 MMOL/L — SIGNIFICANT CHANGE UP (ref 3.5–5.3)
POTASSIUM SERPL-SCNC: 4.7 MMOL/L — SIGNIFICANT CHANGE UP (ref 3.5–5.3)
PROT SERPL-MCNC: 7.1 G/DL — SIGNIFICANT CHANGE UP (ref 6–8.3)
PROT SERPL-MCNC: 7.1 G/DL — SIGNIFICANT CHANGE UP (ref 6–8.3)
PROT SERPL-MCNC: 7.2 G/DL — SIGNIFICANT CHANGE UP (ref 6–8.3)
RBC # BLD: 2.54 M/UL — LOW (ref 4.2–5.8)
RBC # FLD: 14.3 % — SIGNIFICANT CHANGE UP (ref 10.3–14.5)
SODIUM SERPL-SCNC: 137 MMOL/L — SIGNIFICANT CHANGE UP (ref 135–145)
WBC # BLD: 8.16 K/UL — SIGNIFICANT CHANGE UP (ref 3.8–10.5)
WBC # FLD AUTO: 8.16 K/UL — SIGNIFICANT CHANGE UP (ref 3.8–10.5)

## 2020-05-02 PROCEDURE — 99232 SBSQ HOSP IP/OBS MODERATE 35: CPT

## 2020-05-02 RX ADMIN — ATORVASTATIN CALCIUM 10 MILLIGRAM(S): 80 TABLET, FILM COATED ORAL at 21:33

## 2020-05-02 RX ADMIN — AMLODIPINE BESYLATE 10 MILLIGRAM(S): 2.5 TABLET ORAL at 05:12

## 2020-05-02 RX ADMIN — HEPARIN SODIUM 7500 UNIT(S): 5000 INJECTION INTRAVENOUS; SUBCUTANEOUS at 05:13

## 2020-05-02 RX ADMIN — Medication 1: at 22:33

## 2020-05-02 RX ADMIN — GABAPENTIN 200 MILLIGRAM(S): 400 CAPSULE ORAL at 05:12

## 2020-05-02 RX ADMIN — Medication 650 MILLIGRAM(S): at 05:12

## 2020-05-02 RX ADMIN — Medication 4: at 17:43

## 2020-05-02 RX ADMIN — Medication 4 UNIT(S): at 08:23

## 2020-05-02 RX ADMIN — HEPARIN SODIUM 7500 UNIT(S): 5000 INJECTION INTRAVENOUS; SUBCUTANEOUS at 13:51

## 2020-05-02 RX ADMIN — Medication 500 MILLIGRAM(S): at 10:32

## 2020-05-02 RX ADMIN — Medication 50 MILLIGRAM(S): at 22:48

## 2020-05-02 RX ADMIN — CHLORHEXIDINE GLUCONATE 1 APPLICATION(S): 213 SOLUTION TOPICAL at 11:24

## 2020-05-02 RX ADMIN — HEPARIN SODIUM 7500 UNIT(S): 5000 INJECTION INTRAVENOUS; SUBCUTANEOUS at 21:32

## 2020-05-02 RX ADMIN — Medication 2: at 08:23

## 2020-05-02 RX ADMIN — Medication 4 UNIT(S): at 17:43

## 2020-05-02 RX ADMIN — GABAPENTIN 200 MILLIGRAM(S): 400 CAPSULE ORAL at 21:32

## 2020-05-02 RX ADMIN — PANTOPRAZOLE SODIUM 40 MILLIGRAM(S): 20 TABLET, DELAYED RELEASE ORAL at 05:13

## 2020-05-02 RX ADMIN — Medication 1 TABLET(S): at 11:10

## 2020-05-02 RX ADMIN — Medication 2: at 12:23

## 2020-05-02 RX ADMIN — PANTOPRAZOLE SODIUM 40 MILLIGRAM(S): 20 TABLET, DELAYED RELEASE ORAL at 17:43

## 2020-05-02 RX ADMIN — GABAPENTIN 200 MILLIGRAM(S): 400 CAPSULE ORAL at 13:51

## 2020-05-02 RX ADMIN — Medication 4 UNIT(S): at 12:23

## 2020-05-02 RX ADMIN — INSULIN GLARGINE 5 UNIT(S): 100 INJECTION, SOLUTION SUBCUTANEOUS at 22:33

## 2020-05-02 RX ADMIN — Medication 650 MILLIGRAM(S): at 13:51

## 2020-05-02 NOTE — PROGRESS NOTE ADULT - SUBJECTIVE AND OBJECTIVE BOX
STATUS POST:  Surgical pathology procedure  Transthoracic echocardiography (TTE)  US guided central cath  End ileostomy  Open subtotal colectomy  Transthoracic echocardiography (TTE)  CT chest wo con  CT abdomen pelvis  Limited B-scan ultrasound of retroperitoneal aorta      POST OPERATIVE DAY #:     Patient seen and examined at bedside. In no acute distress. Having frequent soft BM via ostomy. No pain, resting comfortably.      OBJECTIVE:    Vital Signs Last 24 Hrs  T(C): 36.9 (02 May 2020 05:45), Max: 36.9 (02 May 2020 05:45)  T(F): 98.4 (02 May 2020 05:45), Max: 98.4 (02 May 2020 05:45)  HR: 76 (01 May 2020 21:05) (76 - 76)  BP: 167/77 (02 May 2020 05:45) (150/65 - 167/77)  BP(mean): --  RR: 19 (02 May 2020 05:45) (19 - 20)  SpO2: 98% (02 May 2020 05:45) (96% - 99%)    I&O's Summary      Physical Exam:  General Appearance: Appears well, NAD  HEENT: Grossly symmetric  Chest: Non labored breathing  CV: Pulse regular presently  Abdomen: Soft, nontender, moderately distended, dull to percussion, ostomy pink and patent w/ large amount of soft stool.   Extremities: Grossly symmetric, no swelling, warm.     LABS:                        7.2    8.16  )-----------( 259      ( 02 May 2020 07:06 )             22.8     05-02    137  |  101  |  19  ----------------------------<  171<H>  4.7   |  23  |  2.46<H>    Ca    8.7      02 May 2020 07:06  Phos  4.2     05-02  Mg     1.4     05-02    TPro  7.2  /  Alb  3.2<L>  /  TBili  0.2  /  DBili  x   /  AST  18  /  ALT  13  /  AlkPhos  98  05-02          RADIOLOGY & ADDITIONAL STUDIES:

## 2020-05-02 NOTE — PROGRESS NOTE ADULT - PROBLEM SELECTOR PLAN 1
probable multifactorial in origin from COVID, exposure to  Vanco and contrast. and pre-renal component  650cc ileostomy output with Scret downtrending  encourage PO intake - aim for slight positive fluid balance

## 2020-05-02 NOTE — PROGRESS NOTE ADULT - SUBJECTIVE AND OBJECTIVE BOX
CC: MULTIPLE MEDICAL COMPLAI      INTERVAL HISTORY:  labs and chart reviewed      ROS: No chest pain, no sob, no abd pain. No n/v/d    PAST MEDICAL & SURGICAL HISTORY:  Anemia  ETOH abuse  HLD (hyperlipidemia)  HTN (hypertension)  DM (diabetes mellitus)  No significant past surgical history      PHYSICAL EXAM:  T(C): 36.9 (05-02-20 @ 05:45), Max: 36.9 (05-02-20 @ 05:45)  HR: 76 (05-01-20 @ 21:05)  BP: 167/77 (05-02-20 @ 05:45) (150/65 - 167/77)  RR: 19 (05-02-20 @ 05:45)  SpO2: 98% (05-02-20 @ 05:45)  Wt(kg): --  I&O's Summary    Weight   General: AAO x 3,  NAD.  HEENT: moist mucous membranes, no pallor/cyanosis.  Neck: no JVD visible.  Cardiac: S1, S2. RRR. No murmurs   Respratory: CTA b/l, no access muscle use.   Abdomen: soft. nontender. nondistended  Skin: no rashes.  Extremities: no LE edema b/l  Access:       DATA:                        7.2<L>  8.16  )-----------( 259      ( 02 May 2020 07:06 )             22.8<L>    Ferritin, Serum: 881 ng/mL <H> (05-02 @ 07:06)      137    |  101    |  19     ----------------------------<  171<H>  Ca:8.7   (02 May 2020 07:06)  4.7     |  23     |  2.46<H>      eGFR if Non : 27 <L>  eGFR if : 32 <L>    TPro  7.2    /  Alb  3.2<L>  /  TBili  0.2    /  DBili  x      /  AST  18     /  ALT  13     /  AlkPhos  98     02 May 2020 07:06                    MEDICATIONS  (STANDING):  amLODIPine   Tablet 10 milliGRAM(s) Oral daily  atorvastatin 10 milliGRAM(s) Oral at bedtime  chlorhexidine 2% Cloths 1 Application(s) Topical daily  dextrose 5%. 1000 milliLiter(s) (50 mL/Hr) IV Continuous <Continuous>  dextrose 50% Injectable 12.5 Gram(s) IV Push once  dextrose 50% Injectable 25 Gram(s) IV Push once  dextrose 50% Injectable 25 Gram(s) IV Push once  diphenoxylate/atropine 1 Tablet(s) Oral daily  gabapentin 200 milliGRAM(s) Oral every 8 hours  heparin   Injectable 7500 Unit(s) SubCutaneous every 8 hours  insulin glargine Injectable (LANTUS) 5 Unit(s) SubCutaneous at bedtime  insulin lispro (HumaLOG) corrective regimen sliding scale   SubCutaneous at bedtime  insulin lispro (HumaLOG) corrective regimen sliding scale   SubCutaneous three times a day before meals  insulin lispro Injectable (HumaLOG) 4 Unit(s) SubCutaneous three times a day before meals  ondansetron Injectable 4 milliGRAM(s) IV Push once  pantoprazole    Tablet 40 milliGRAM(s) Oral every 12 hours  rifAXIMin 550 milliGRAM(s) Oral two times a day  sodium bicarbonate 650 milliGRAM(s) Oral three times a day    MEDICATIONS  (PRN):  acetaminophen   Tablet .. 500 milliGRAM(s) Oral every 6 hours PRN Temp greater or equal to 38C (100.4F), Mild Pain (1 - 3)  dextrose 40% Gel 15 Gram(s) Oral once PRN Blood Glucose LESS THAN 70 milliGRAM(s)/deciliter  glucagon  Injectable 1 milliGRAM(s) IntraMuscular once PRN Glucose LESS THAN 70 milligrams/deciliter  traZODone 50 milliGRAM(s) Oral at bedtime PRN Sleep

## 2020-05-02 NOTE — PROGRESS NOTE ADULT - ASSESSMENT
61 y/o M with PMH of HTN, DM complicated by neuropathy, EtOH liver cirrhosis, presenting w/ pneumatosis of unknown etiology, s/p sub total colectomy and end ileostomy on 3/31, c/b fever and purulent wound infection (4/9) now s/p paracentesis 4/13 found to be COVID (+). Clinically stable.    -Zofran for Nausea   -f/o Ileostomy output  - c/w Lomotil  - Rest of care per primary team 61 y/o M with PMH of HTN, DM complicated by neuropathy, EtOH liver cirrhosis, presenting w/ pneumatosis of unknown etiology, s/p sub total colectomy and end ileostomy on 3/31, c/b fever and purulent wound infection (4/9) now s/p paracentesis 4/13 found to be COVID (+). Clinically stable.    - Abdominal ultrasound to evaluate for ascites  -Zofran for Nausea   -f/o Ileostomy output  - c/w Lomotil  - Rest of care per primary team

## 2020-05-02 NOTE — PROGRESS NOTE ADULT - ASSESSMENT
59 y/o M with PMH of HTN, DM complicated by neuropathy, IVDU, ETOH liver cirrhosis, who presents to [facility] for fall at home; found to have toxic megacolon and pneumatosis; underwent ex lap and subtotal colectomy, and ileostomy on 3/31  COVID +

## 2020-05-03 LAB
BLD GP AB SCN SERPL QL: NEGATIVE — SIGNIFICANT CHANGE UP
GLUCOSE BLDC GLUCOMTR-MCNC: 158 MG/DL — HIGH (ref 70–99)
GLUCOSE BLDC GLUCOMTR-MCNC: 198 MG/DL — HIGH (ref 70–99)
GLUCOSE BLDC GLUCOMTR-MCNC: 216 MG/DL — HIGH (ref 70–99)
GLUCOSE BLDC GLUCOMTR-MCNC: 250 MG/DL — HIGH (ref 70–99)
RH IG SCN BLD-IMP: POSITIVE — SIGNIFICANT CHANGE UP
T4 FREE SERPL-MCNC: 1.01 NG/DL — SIGNIFICANT CHANGE UP (ref 0.7–1.48)
TSH SERPL-MCNC: 3.51 UIU/ML — SIGNIFICANT CHANGE UP (ref 0.35–4.94)

## 2020-05-03 PROCEDURE — 76705 ECHO EXAM OF ABDOMEN: CPT | Mod: 26

## 2020-05-03 PROCEDURE — 99232 SBSQ HOSP IP/OBS MODERATE 35: CPT

## 2020-05-03 RX ORDER — INSULIN GLARGINE 100 [IU]/ML
8 INJECTION, SOLUTION SUBCUTANEOUS AT BEDTIME
Refills: 0 | Status: DISCONTINUED | OUTPATIENT
Start: 2020-05-03 | End: 2020-05-04

## 2020-05-03 RX ADMIN — CHLORHEXIDINE GLUCONATE 1 APPLICATION(S): 213 SOLUTION TOPICAL at 12:04

## 2020-05-03 RX ADMIN — AMLODIPINE BESYLATE 10 MILLIGRAM(S): 2.5 TABLET ORAL at 06:30

## 2020-05-03 RX ADMIN — INSULIN GLARGINE 8 UNIT(S): 100 INJECTION, SOLUTION SUBCUTANEOUS at 21:39

## 2020-05-03 RX ADMIN — HEPARIN SODIUM 7500 UNIT(S): 5000 INJECTION INTRAVENOUS; SUBCUTANEOUS at 13:43

## 2020-05-03 RX ADMIN — PANTOPRAZOLE SODIUM 40 MILLIGRAM(S): 20 TABLET, DELAYED RELEASE ORAL at 06:26

## 2020-05-03 RX ADMIN — HEPARIN SODIUM 7500 UNIT(S): 5000 INJECTION INTRAVENOUS; SUBCUTANEOUS at 06:27

## 2020-05-03 RX ADMIN — ATORVASTATIN CALCIUM 10 MILLIGRAM(S): 80 TABLET, FILM COATED ORAL at 21:41

## 2020-05-03 RX ADMIN — Medication 50 MILLIGRAM(S): at 22:14

## 2020-05-03 RX ADMIN — Medication 650 MILLIGRAM(S): at 06:25

## 2020-05-03 RX ADMIN — Medication 650 MILLIGRAM(S): at 00:53

## 2020-05-03 RX ADMIN — Medication 500 MILLIGRAM(S): at 18:45

## 2020-05-03 RX ADMIN — GABAPENTIN 200 MILLIGRAM(S): 400 CAPSULE ORAL at 13:43

## 2020-05-03 RX ADMIN — Medication 4 UNIT(S): at 08:45

## 2020-05-03 RX ADMIN — Medication 2: at 12:03

## 2020-05-03 RX ADMIN — Medication 1 TABLET(S): at 12:04

## 2020-05-03 RX ADMIN — PANTOPRAZOLE SODIUM 40 MILLIGRAM(S): 20 TABLET, DELAYED RELEASE ORAL at 17:32

## 2020-05-03 RX ADMIN — Medication 2: at 08:44

## 2020-05-03 RX ADMIN — Medication 4 UNIT(S): at 17:32

## 2020-05-03 RX ADMIN — GABAPENTIN 200 MILLIGRAM(S): 400 CAPSULE ORAL at 21:40

## 2020-05-03 RX ADMIN — Medication 4: at 17:32

## 2020-05-03 RX ADMIN — GABAPENTIN 200 MILLIGRAM(S): 400 CAPSULE ORAL at 06:26

## 2020-05-03 RX ADMIN — Medication 2: at 21:41

## 2020-05-03 RX ADMIN — HEPARIN SODIUM 7500 UNIT(S): 5000 INJECTION INTRAVENOUS; SUBCUTANEOUS at 21:41

## 2020-05-03 RX ADMIN — Medication 650 MILLIGRAM(S): at 21:39

## 2020-05-03 RX ADMIN — Medication 650 MILLIGRAM(S): at 13:43

## 2020-05-03 RX ADMIN — Medication 500 MILLIGRAM(S): at 12:09

## 2020-05-03 RX ADMIN — Medication 4 UNIT(S): at 12:03

## 2020-05-03 NOTE — PROGRESS NOTE ADULT - PROBLEM SELECTOR PLAN 6
Type 2 DM, endocrinology following   - continue lantus 8U qhs   - continue lispro 4U TID pre-meals  - moderate SSI   - FSG goal 140-180    #hypothyroidism   - TSH 2.813; TPO and antithyroid Abs neg   - continue levothyroxine 50mcg   - appreciate endocrinology recs    #neuropathy   - per pt, pt takes gabapentin 1200mg TID; would like to restart (not getting pain meds is part of reason he is having SI). explained that due to renal failure, we were holding   - CrCl 25-30; restarted gabapentin 200mg daily 4/26 Type 2 DM, endocrinology following   -lantus 5U qhs   -lispro 4U TID pre-meal  -mISS    #hypothyroidism   -TSH 2.813; TPO and antithyroid Abs neg   -levothyroxine 50mcg     #neuropathy   Home regimen: gabapentin 1200mg TID  -CrCl 25-30; gabapentin 200mg qd

## 2020-05-03 NOTE — PROGRESS NOTE ADULT - SUBJECTIVE AND OBJECTIVE BOX
CC: MULTIPLE MEDICAL COMPLAI      INTERVAL HISTORY:  chart and labs reviewed      ROS: No chest pain, no sob, no abd pain. No n/v/d    PAST MEDICAL & SURGICAL HISTORY:  Anemia  ETOH abuse  HLD (hyperlipidemia)  HTN (hypertension)  DM (diabetes mellitus)  No significant past surgical history      PHYSICAL EXAM:  T(C): 36.7 (05-03-20 @ 07:01), Max: 37.1 (05-02-20 @ 12:25)  HR: 83 (05-03-20 @ 07:01)  BP: 158/79 (05-03-20 @ 07:01) (146/74 - 167/73)  RR: 18 (05-03-20 @ 07:01)  SpO2: 98% (05-03-20 @ 07:01)  Wt(kg): --  I&O's Summary    02 May 2020 07:01  -  03 May 2020 07:00  --------------------------------------------------------  IN: 0 mL / OUT: 2450 mL / NET: -2450 mL      Weight   General: AAO x 3,  NAD.  HEENT: moist mucous membranes, no pallor/cyanosis.  Neck: no JVD visible.  Cardiac: S1, S2. RRR. No murmurs   Respratory: CTA b/l, no access muscle use.   Abdomen: soft. nontender. nondistended  Skin: no rashes.  Extremities: no LE edema b/l  Access:       DATA:                        7.2<L>  8.16  )-----------( 259      ( 02 May 2020 07:06 )             22.8<L>    Ferritin, Serum: 881 ng/mL <H> (05-02 @ 07:06)      137    |  101    |  19     ----------------------------<  171<H>  Ca:8.7   (02 May 2020 07:06)  4.7     |  23     |  2.46<H>        TPro  7.2    /  Alb  3.2<L>  /  TBili  0.2    /  DBili  x      /  AST  18     /  ALT  13     /  AlkPhos  98     02 May 2020 07:06                    MEDICATIONS  (STANDING):  amLODIPine   Tablet 10 milliGRAM(s) Oral daily  atorvastatin 10 milliGRAM(s) Oral at bedtime  chlorhexidine 2% Cloths 1 Application(s) Topical daily  dextrose 5%. 1000 milliLiter(s) (50 mL/Hr) IV Continuous <Continuous>  dextrose 50% Injectable 12.5 Gram(s) IV Push once  dextrose 50% Injectable 25 Gram(s) IV Push once  dextrose 50% Injectable 25 Gram(s) IV Push once  diphenoxylate/atropine 1 Tablet(s) Oral daily  gabapentin 200 milliGRAM(s) Oral every 8 hours  heparin   Injectable 7500 Unit(s) SubCutaneous every 8 hours  insulin glargine Injectable (LANTUS) 5 Unit(s) SubCutaneous at bedtime  insulin lispro (HumaLOG) corrective regimen sliding scale   SubCutaneous at bedtime  insulin lispro (HumaLOG) corrective regimen sliding scale   SubCutaneous three times a day before meals  insulin lispro Injectable (HumaLOG) 4 Unit(s) SubCutaneous three times a day before meals  ondansetron Injectable 4 milliGRAM(s) IV Push once  pantoprazole    Tablet 40 milliGRAM(s) Oral every 12 hours  rifAXIMin 550 milliGRAM(s) Oral two times a day  sodium bicarbonate 650 milliGRAM(s) Oral three times a day    MEDICATIONS  (PRN):  acetaminophen   Tablet .. 500 milliGRAM(s) Oral every 6 hours PRN Temp greater or equal to 38C (100.4F), Mild Pain (1 - 3)  dextrose 40% Gel 15 Gram(s) Oral once PRN Blood Glucose LESS THAN 70 milliGRAM(s)/deciliter  glucagon  Injectable 1 milliGRAM(s) IntraMuscular once PRN Glucose LESS THAN 70 milligrams/deciliter  traZODone 50 milliGRAM(s) Oral at bedtime PRN Sleep

## 2020-05-03 NOTE — PROGRESS NOTE ADULT - PROBLEM SELECTOR PLAN 5
Normocytic anemia. Likely 2/2 anemia of chronic disease and kidney dysfunction vs acute blood loss. Hgb 7.4 4/30, ileostomy output with brown stool   -monitor hb, transfuse <7  - s/p 4U pRBCs total this admission as of 4/24  - continue po pantoprazole 40mg BID   - monitor for signs of bleeding   - GI following and planned for video capsule endoscopy this admission, but pt refused Normocytic likely 2/2 chronic disease + kidney dysfunction vs less likely acute blood loss  -monitor hb, transfuse <7  -s/p 4U pRBCs total this admission as of 4/24  -pantoprazole 40mg BID   -monitor for signs of bleeding   -GI following and planned for video capsule endoscopy this admission, however patient refused

## 2020-05-03 NOTE — PROGRESS NOTE ADULT - ASSESSMENT
60M PMH HTN, DM + neuropathy, IVDU, ETOH liver cirrhosis, [facility]; found with toxic megacolon and pneumatosis; now s/p ex lap + subtotal colectomy, ileostomy (3/31). Developed COVID 19 while in the hospital, but subsequent test was positive, patient was transferred to [facility] tele COVID unit without need for supplemental oxygen. Now on RA, on wet to dry dressing, walked with PT, MEG improving, H/H downtrending s/p multiple units of PRBC, following up heme work up to determine if there is a source of bleed vs a hematologic cause for anemia; if H/H stable, then pt stable for dispo to retirement when set up with . Pt currently needs daily wound/ostomy checks. Shelter [facility] only has 1 x a week wound check available

## 2020-05-03 NOTE — PROGRESS NOTE ADULT - SUBJECTIVE AND OBJECTIVE BOX
OVERNIGHT EVENTS: No acute events overnight. Afebrile, stable respiratory status on room air.   SUBJECTIVE: Patient seen and examined at the bedside. Patient denies new complaints at this time. 12-point ROS reviewed and is otherwise negative.    Vital Signs Last 12 Hrs  T(F): 98 (05-03-20 @ 07:01), Max: 98 (05-03-20 @ 07:01)  HR: 83 (05-03-20 @ 07:01) (80 - 83)  BP: 158/79 (05-03-20 @ 07:01) (158/79 - 167/73)  BP(mean): --  RR: 18 (05-03-20 @ 07:01) (18 - 21)  SpO2: 98% (05-03-20 @ 07:01) (97% - 98%)    I&O's Summary  02 May 2020 07:01  -  03 May 2020 07:00  --------------------------------------------------------  IN: 0 mL / OUT: 2450 mL / NET: -2450 mL    PHYSICAL EXAM:  General: NAD, resting comfortably sitting up in bed  HEENT: NCAT, EOMI, MMM  Neck: supple  Respiratory: CTA, normal respiratory rate/depth/effort  Cardiovascular: normal S1/S2, RRR, no MRG  Vascular: 2+ radial/DP pulses b/l  Abdomen: NT, soft, distended, ileostomy dressing c/d/i  Extremities: WWP, no clubbing, no cyanosis, no edema  Neuro: AAOx3    LABS:                    7.2    8.16  )-----------( 259      ( 02 May 2020 07:06 )             22.8     05-02    137  |  101  |  19  ----------------------------<  171<H>  4.7   |  23  |  2.46<H>    Ca    8.7      02 May 2020 07:06  Phos  4.2     05-02  Mg     1.4     05-02  TPro  7.2  /  Alb  3.2<L>  /  TBili  0.2  /  DBili  x   /  AST  18  /  ALT  13  /  AlkPhos  98  05-02    RADIOLOGY & ADDITIONAL TESTS: Reviewed    MEDICATIONS  (STANDING):  amLODIPine   Tablet 10 milliGRAM(s) Oral daily  atorvastatin 10 milliGRAM(s) Oral at bedtime  chlorhexidine 2% Cloths 1 Application(s) Topical daily  dextrose 5%. 1000 milliLiter(s) (50 mL/Hr) IV Continuous <Continuous>  dextrose 50% Injectable 12.5 Gram(s) IV Push once  dextrose 50% Injectable 25 Gram(s) IV Push once  dextrose 50% Injectable 25 Gram(s) IV Push once  diphenoxylate/atropine 1 Tablet(s) Oral daily  gabapentin 200 milliGRAM(s) Oral every 8 hours  heparin   Injectable 7500 Unit(s) SubCutaneous every 8 hours  insulin glargine Injectable (LANTUS) 5 Unit(s) SubCutaneous at bedtime  insulin lispro (HumaLOG) corrective regimen sliding scale   SubCutaneous at bedtime  insulin lispro (HumaLOG) corrective regimen sliding scale   SubCutaneous three times a day before meals  insulin lispro Injectable (HumaLOG) 4 Unit(s) SubCutaneous three times a day before meals  ondansetron Injectable 4 milliGRAM(s) IV Push once  pantoprazole    Tablet 40 milliGRAM(s) Oral every 12 hours  rifAXIMin 550 milliGRAM(s) Oral two times a day  sodium bicarbonate 650 milliGRAM(s) Oral three times a day    MEDICATIONS  (PRN):  acetaminophen   Tablet .. 500 milliGRAM(s) Oral every 6 hours PRN Temp greater or equal to 38C (100.4F), Mild Pain (1 - 3)  dextrose 40% Gel 15 Gram(s) Oral once PRN Blood Glucose LESS THAN 70 milliGRAM(s)/deciliter  glucagon  Injectable 1 milliGRAM(s) IntraMuscular once PRN Glucose LESS THAN 70 milligrams/deciliter  traZODone 50 milliGRAM(s) Oral at bedtime PRN Sleep

## 2020-05-03 NOTE — PROGRESS NOTE ADULT - ASSESSMENT
59 y/o M with PMH of HTN, DM complicated by neuropathy, IVDU, ETOH liver cirrhosis, who presents to [facility] for fall at home; found to have toxic megacolon and pneumatosis; underwent ex lap and subtotal colectomy, and ileostomy on 3/31  COVID +

## 2020-05-03 NOTE — PROGRESS NOTE ADULT - PROBLEM SELECTOR PLAN 2
RPT PARACENTISIS 5/2 for distention f/u with results  Decompensated cirrhosis likely 2/2 alcohol use, c/b ascites, hepatic encephalopathy on admission (resolved) sbp ruled out  - CT abd/pelvis with persistent large ascites and peritonitis; bilateral pleural effusions and pericardial effusion  - MELD-Na score 28  - s/p paracentesis on 4/10 with negative cytology but not sent for cell count/culture, however was on zosyn for 2 weeks so GI with no concerns for SBP   - repeat paracentesis 4/19, but unable to aspirate enough fluid?   - SAAG 1.4 consistent with portal hypertension as etiology of ascites  - continue rifaximin 550mg BID (changed from lactulose) for hepatic encephalopathy   - holding lactulose for high ostomy output  - unclear if pt with varices- pt refuses gi workup **today seems amenable to GI work up- monitoring hb and active T+S transfuse if needed  - Hgb slowly dropping, however ileostomy without melanotic stool; light brown/green soft stool  - pt refusing video capsule endoscopy this admission; explained risks of not undergoing procedure and pt understands and repeats back risks of refusing procedure.   - pt needs screening for HCC outpatient and follow up with hepatology outpatient Decompensated cirrhosis c/b ascites likely 2/2 alcohol abuse  -SBP ruled out, hepatic encephalopathy present on admission now resolved  -CT abd/pelvis with persistent large ascites and peritonitis; bilateral pleural effusions and pericardial effusion  -s/p paracentesis on 4/10 with negative cytology but not sent for cell count/culture, however was on zosyn for 2 weeks so GI with no concerns for SBP   -s/p repeat paracentesis 4/19, but unable to aspirate significant amount of fluid   -SAAG 1.4 consistent with portal hypertension as etiology of ascites  -Rifaximin 550mg bid for hepatic encephalopathy   -holding lactulose given high ostomy output  -Hgb slowly dropping; ileostomy without melanotic stool; light brown/green soft stool  -pt refusing video capsule endoscopy this admission   -screening for HCC outpatient with hepatology follow-up

## 2020-05-03 NOTE — PROGRESS NOTE ADULT - PROBLEM SELECTOR PLAN 8
- restart home half dose trazodone 50mg qhs prn (100mg home dose)    #suicidal ideation   Pt endorsing SI without a plan.   - psychiatry consult cleared for discharge; no additional psychiatric medications, continue trazodone; no utility in inpatient psych, likely adjustment disorder with depressive mood related to social issues  - social work re: going back to shelter -home half dose trazodone 50mg qhs prn (100mg home dose)    #suicidal ideation   Pt endorsing SI without a plan.   -psychiatry consult cleared for discharge; no additional psychiatric medications, continue trazodone; no utility in inpatient psych, likely adjustment disorder with depressive mood related to social issues  -social work re: going back to shelter

## 2020-05-03 NOTE — PROGRESS NOTE ADULT - PROBLEM SELECTOR PLAN 3
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   - s/p laparotomy with wound vac in place (gen surg following)   - ileostomy in place draining brown stool with no signs of bleeding  - general surgery following, appreciate recs   - gen surg recommending lomotil- low dose to prevent constipation/ hepatic encephalopthy  - if pt with high ileostomy output, consider giving back fluid  - has wet to dry dressing on surgical wound.    #s/p wound vac  - would care NP following and continue teaching to patient on wound vac care and instructions  - now has wet to dry dressing, surgery following Now s/p 3/31/20 ex lap, subtotal colectomy+ileostomy  course c/b fever and purulent wound infection (4/9), s/p vanc/zosyn.   -s/p laparotomy with wound vac in place (gen surg following)   -ileostomy in place draining brown stool with no signs of bleeding  -lomotil- low dose to prevent constipation/ hepatic encephalopthy  -general surgery following, appreciate recs  -abdominal u/s for increasing abdominal ascites: f/u  -woundcare as below      #s/p wound vac  -woundcare NP following and continue teaching to patient on wound vac care and instructions  -wet to dry dressing, surgery following

## 2020-05-03 NOTE — PROGRESS NOTE ADULT - ASSESSMENT
61 y/o M with PMH of HTN, DM complicated by neuropathy, EtOH liver cirrhosis, presenting w/ pneumatosis of unknown etiology, s/p sub total colectomy and end ileostomy on 3/31, c/b fever and purulent wound infection (4/9) now s/p paracentesis 4/13 found to be COVID (+). Clinically stable. In no acute distress.     - Abdominal ultrasound to evaluate for ascites  -Zofran for Nausea   -f/o Ileostomy output  - c/w Lomotil  - Rest of care per primary team

## 2020-05-03 NOTE — PROGRESS NOTE ADULT - PROBLEM SELECTOR PLAN 7
- continue home dose of amlodipine at 10mg daily (10mg starting 4/24)  - restart home pravastatin 40mg daily; therapeutic interchange with atorvastatin 10mg  - BP 5/1 is 145/68  -holding home hctz and lisinopril given MEG/ ARF Home regimen: amlodipine 10mg qd, HCTZ, Lisinopril  -continue amlodipine  -hold HCTZ Lisinopril in setting of MEG  -monitor BP    #HLD  Home regimen: pravastatin 40mg qhs  -continue statin as therapeutic interchange

## 2020-05-03 NOTE — PROGRESS NOTE ADULT - PROBLEM SELECTOR PLAN 4
Nonoliguric MEG 2/2 likely ATN, per nephrology. Per renal, likely combination of pre-renal, intrinsic cause 2/2 COVID-19 and contrast exposure on 4/10 vs vancomycin toxicity. IMPROVING seen by nephro- creatine improved from 3.24 to 2.88  - previously on albumin, octreotide, and midodrine for albumin trial for possible hepatorenal syndrome, but without improvement in kidney function  - FeNa 2.0% and FeUrea 63% intrinsic disease  - Cr downtrending, renal says no indication of HD; follow up outpatient  - net even fluid balance   - renal recommending gentle IV hydration if high GI output  - avoid ACE/ARB/NSAIDs/metformin/contrast   - strict I/Os   - appreciate renal recs    #hyponatremia   RESOLVED     #metabolic acidosis   - sodium bicarbonate tabs TID per renal Nonoliguric MEG likely 2/2 pre-renal + intrinsic 2/2 COVID-19 +/- contrast exposure +/- vancomycin toxicity  -Cr downtrending  -avoid nephrotoxic agents  -gentle IV hydration if high GI output  -monitor I/Os   -appreciate renal recs    #Non-anion gap metabolic acidosis  -sodium bicarbonate TID

## 2020-05-03 NOTE — PROGRESS NOTE ADULT - SUBJECTIVE AND OBJECTIVE BOX
STATUS POST:      INTERVAL HPI/OVERNIGHT EVENTS: tachypnea to 39 930pm, immediately assessed patient bedside speaking in full sentences satting 94-95% RA at a rate of 21. no new complaints. Ileostomy output 500 cc o/n    SUBJECTIVE: Examined at the bedside. Denies nausea, emesis, cp. No acute complaints.     amLODIPine   Tablet 10 milliGRAM(s) Oral daily  heparin   Injectable 7500 Unit(s) SubCutaneous every 8 hours  rifAXIMin 550 milliGRAM(s) Oral two times a day      Vital Signs Last 24 Hrs  T(C): 36.7 (03 May 2020 07:01), Max: 37.1 (02 May 2020 12:25)  T(F): 98 (03 May 2020 07:01), Max: 98.7 (02 May 2020 12:25)  HR: 83 (03 May 2020 07:01) (77 - 83)  BP: 158/79 (03 May 2020 07:01) (146/74 - 167/73)  BP(mean): --  RR: 18 (03 May 2020 07:01) (18 - 21)  SpO2: 98% (03 May 2020 07:01) (96% - 98%)  I&O's Detail    02 May 2020 07:01  -  03 May 2020 07:00  --------------------------------------------------------  IN:  Total IN: 0 mL    OUT:    Ileostomy: 850 mL    Voided: 1600 mL  Total OUT: 2450 mL    Total NET: -2450 mL          Physical Exam  General: NAD, resting comfortably in bed  C/V: NSR  Pulm: Nonlabored breathing, no respiratory distress  Abd: soft, non-tender, mildly distended, midline wound with good granulation. Ileostomy with liquid stool  Extrem: WWP, no edema,  Neuro:      LABS:                        7.2    8.16  )-----------( 259      ( 02 May 2020 07:06 )             22.8     05-02    137  |  101  |  19  ----------------------------<  171<H>  4.7   |  23  |  2.46<H>    Ca    8.7      02 May 2020 07:06  Phos  4.2     05-02  Mg     1.4     05-02    TPro  7.2  /  Alb  3.2<L>  /  TBili  0.2  /  DBili  x   /  AST  18  /  ALT  13  /  AlkPhos  98  05-02          RADIOLOGY & ADDITIONAL STUDIES:

## 2020-05-03 NOTE — PROGRESS NOTE ADULT - PROBLEM SELECTOR PLAN 1
COVID+   - s/p hydroxychloroquine and azithromycin 4/13-4/16  - saturating 96% on RA 4/12 COVID+   -s/p hydroxychloroquine + azithromycin 4/13-4/16  -stable respiratory status on room air, continue to monitor respiratory status  -inflammatory markers with continued downtrend  -isolation precautions

## 2020-05-03 NOTE — PROGRESS NOTE ADULT - PROBLEM SELECTOR PLAN 9
F: none  E: cautious given renal failure  N: low fiber diet  GI ppx: protonix BID   DVT ppx: holding heparin for possible bleeding; DVT neg by ultrasound F: none  E: replete prn  N: low fiber diet  GI ppx: protonix BID   DVT ppx: holding heparin for possible bleeding; DVT neg by ultrasound

## 2020-05-04 DIAGNOSIS — E11.40 TYPE 2 DIABETES MELLITUS WITH DIABETIC NEUROPATHY, UNSPECIFIED: ICD-10-CM

## 2020-05-04 LAB
% ALBUMIN: 42.9 % — SIGNIFICANT CHANGE UP
% ALPHA 1: 8.1 % — SIGNIFICANT CHANGE UP
% ALPHA 2: 14.7 % — SIGNIFICANT CHANGE UP
% BETA: 11.8 % — SIGNIFICANT CHANGE UP
% GAMMA: 22.5 % — SIGNIFICANT CHANGE UP
% M SPIKE: SIGNIFICANT CHANGE UP %
ALBUMIN SERPL ELPH-MCNC: 3 G/DL — LOW (ref 3.6–5.5)
ALBUMIN SERPL ELPH-MCNC: 3.2 G/DL — LOW (ref 3.3–5)
ALBUMIN/GLOB SERPL ELPH: 0.7 RATIO — SIGNIFICANT CHANGE UP
ALP SERPL-CCNC: 107 U/L — SIGNIFICANT CHANGE UP (ref 40–120)
ALPHA1 GLOB SERPL ELPH-MCNC: 0.6 G/DL — HIGH (ref 0.1–0.4)
ALPHA2 GLOB SERPL ELPH-MCNC: 1 G/DL — SIGNIFICANT CHANGE UP (ref 0.5–1)
ALT FLD-CCNC: 18 U/L — SIGNIFICANT CHANGE UP (ref 10–45)
ANION GAP SERPL CALC-SCNC: 12 MMOL/L — SIGNIFICANT CHANGE UP (ref 5–17)
APPEARANCE UR: CLEAR — SIGNIFICANT CHANGE UP
AST SERPL-CCNC: 21 U/L — SIGNIFICANT CHANGE UP (ref 10–40)
B-GLOBULIN SERPL ELPH-MCNC: 0.8 G/DL — SIGNIFICANT CHANGE UP (ref 0.5–1)
BASOPHILS # BLD AUTO: 0.01 K/UL — SIGNIFICANT CHANGE UP (ref 0–0.2)
BASOPHILS NFR BLD AUTO: 0.1 % — SIGNIFICANT CHANGE UP (ref 0–2)
BILIRUB SERPL-MCNC: 0.2 MG/DL — SIGNIFICANT CHANGE UP (ref 0.2–1.2)
BILIRUB UR-MCNC: NEGATIVE — SIGNIFICANT CHANGE UP
BUN SERPL-MCNC: 25 MG/DL — HIGH (ref 7–23)
CALCIUM SERPL-MCNC: 9 MG/DL — SIGNIFICANT CHANGE UP (ref 8.4–10.5)
CHLORIDE SERPL-SCNC: 100 MMOL/L — SIGNIFICANT CHANGE UP (ref 96–108)
CO2 SERPL-SCNC: 23 MMOL/L — SIGNIFICANT CHANGE UP (ref 22–31)
COLOR SPEC: YELLOW — SIGNIFICANT CHANGE UP
CREAT ?TM UR-MCNC: 28 MG/DL — SIGNIFICANT CHANGE UP
CREAT SERPL-MCNC: 2.41 MG/DL — HIGH (ref 0.5–1.3)
CRP SERPL-MCNC: 8.32 MG/DL — HIGH (ref 0–0.4)
D DIMER BLD IA.RAPID-MCNC: 2202 NG/ML DDU — HIGH
DIFF PNL FLD: NEGATIVE — SIGNIFICANT CHANGE UP
EOSINOPHIL # BLD AUTO: 0.26 K/UL — SIGNIFICANT CHANGE UP (ref 0–0.5)
EOSINOPHIL NFR BLD AUTO: 3.6 % — SIGNIFICANT CHANGE UP (ref 0–6)
FERRITIN SERPL-MCNC: 825 NG/ML — HIGH (ref 30–400)
GAMMA GLOBULIN: 1.6 G/DL — SIGNIFICANT CHANGE UP (ref 0.6–1.6)
GLUCOSE BLDC GLUCOMTR-MCNC: 156 MG/DL — HIGH (ref 70–99)
GLUCOSE BLDC GLUCOMTR-MCNC: 172 MG/DL — HIGH (ref 70–99)
GLUCOSE BLDC GLUCOMTR-MCNC: 183 MG/DL — HIGH (ref 70–99)
GLUCOSE BLDC GLUCOMTR-MCNC: 186 MG/DL — HIGH (ref 70–99)
GLUCOSE SERPL-MCNC: 154 MG/DL — HIGH (ref 70–99)
GLUCOSE UR QL: NEGATIVE — SIGNIFICANT CHANGE UP
HCT VFR BLD CALC: 22.7 % — LOW (ref 39–50)
HGB BLD-MCNC: 7.2 G/DL — LOW (ref 13–17)
IMM GRANULOCYTES NFR BLD AUTO: 1.9 % — HIGH (ref 0–1.5)
INTERPRETATION SERPL IFE-IMP: SIGNIFICANT CHANGE UP
KETONES UR-MCNC: NEGATIVE — SIGNIFICANT CHANGE UP
LEUKOCYTE ESTERASE UR-ACNC: NEGATIVE — SIGNIFICANT CHANGE UP
LYMPHOCYTES # BLD AUTO: 0.83 K/UL — LOW (ref 1–3.3)
LYMPHOCYTES # BLD AUTO: 11.4 % — LOW (ref 13–44)
M-SPIKE: SIGNIFICANT CHANGE UP G/DL (ref 0–0)
MAGNESIUM SERPL-MCNC: 1.3 MG/DL — LOW (ref 1.6–2.6)
MCHC RBC-ENTMCNC: 28.9 PG — SIGNIFICANT CHANGE UP (ref 27–34)
MCHC RBC-ENTMCNC: 31.7 GM/DL — LOW (ref 32–36)
MCV RBC AUTO: 91.2 FL — SIGNIFICANT CHANGE UP (ref 80–100)
MONOCYTES # BLD AUTO: 0.69 K/UL — SIGNIFICANT CHANGE UP (ref 0–0.9)
MONOCYTES NFR BLD AUTO: 9.4 % — SIGNIFICANT CHANGE UP (ref 2–14)
NEUTROPHILS # BLD AUTO: 5.38 K/UL — SIGNIFICANT CHANGE UP (ref 1.8–7.4)
NEUTROPHILS NFR BLD AUTO: 73.6 % — SIGNIFICANT CHANGE UP (ref 43–77)
NITRITE UR-MCNC: POSITIVE
NRBC # BLD: 0 /100 WBCS — SIGNIFICANT CHANGE UP (ref 0–0)
PH UR: 6 — SIGNIFICANT CHANGE UP (ref 5–8)
PHOSPHATE SERPL-MCNC: 3.7 MG/DL — SIGNIFICANT CHANGE UP (ref 2.5–4.5)
PLATELET # BLD AUTO: 294 K/UL — SIGNIFICANT CHANGE UP (ref 150–400)
POTASSIUM SERPL-MCNC: 4.5 MMOL/L — SIGNIFICANT CHANGE UP (ref 3.5–5.3)
POTASSIUM SERPL-SCNC: 4.5 MMOL/L — SIGNIFICANT CHANGE UP (ref 3.5–5.3)
PROT PATTERN SERPL ELPH-IMP: SIGNIFICANT CHANGE UP
PROT SERPL-MCNC: 7.5 G/DL — SIGNIFICANT CHANGE UP (ref 6–8.3)
PROT UR-MCNC: NEGATIVE MG/DL — SIGNIFICANT CHANGE UP
RBC # BLD: 2.49 M/UL — LOW (ref 4.2–5.8)
RBC # FLD: 14.6 % — HIGH (ref 10.3–14.5)
SODIUM SERPL-SCNC: 135 MMOL/L — SIGNIFICANT CHANGE UP (ref 135–145)
SODIUM UR-SCNC: 39 MMOL/L — SIGNIFICANT CHANGE UP
SP GR SPEC: <=1.005 — SIGNIFICANT CHANGE UP (ref 1–1.03)
UROBILINOGEN FLD QL: 0.2 E.U./DL — SIGNIFICANT CHANGE UP
UUN UR-MCNC: 178 MG/DL — SIGNIFICANT CHANGE UP
WBC # BLD: 7.31 K/UL — SIGNIFICANT CHANGE UP (ref 3.8–10.5)
WBC # FLD AUTO: 7.31 K/UL — SIGNIFICANT CHANGE UP (ref 3.8–10.5)

## 2020-05-04 PROCEDURE — 99231 SBSQ HOSP IP/OBS SF/LOW 25: CPT | Mod: GC

## 2020-05-04 PROCEDURE — 99233 SBSQ HOSP IP/OBS HIGH 50: CPT

## 2020-05-04 PROCEDURE — 99232 SBSQ HOSP IP/OBS MODERATE 35: CPT

## 2020-05-04 RX ORDER — INSULIN GLARGINE 100 [IU]/ML
10 INJECTION, SOLUTION SUBCUTANEOUS AT BEDTIME
Refills: 0 | Status: DISCONTINUED | OUTPATIENT
Start: 2020-05-04 | End: 2020-05-05

## 2020-05-04 RX ORDER — BRIMONIDINE TARTRATE 2 MG/MG
1 SOLUTION/ DROPS OPHTHALMIC THREE TIMES A DAY
Refills: 0 | Status: DISCONTINUED | OUTPATIENT
Start: 2020-05-04 | End: 2020-06-05

## 2020-05-04 RX ORDER — GABAPENTIN 400 MG/1
300 CAPSULE ORAL EVERY 8 HOURS
Refills: 0 | Status: DISCONTINUED | OUTPATIENT
Start: 2020-05-04 | End: 2020-06-05

## 2020-05-04 RX ORDER — INSULIN LISPRO 100/ML
6 VIAL (ML) SUBCUTANEOUS
Refills: 0 | Status: DISCONTINUED | OUTPATIENT
Start: 2020-05-04 | End: 2020-05-19

## 2020-05-04 RX ORDER — MAGNESIUM SULFATE 500 MG/ML
4 VIAL (ML) INJECTION ONCE
Refills: 0 | Status: COMPLETED | OUTPATIENT
Start: 2020-05-04 | End: 2020-05-04

## 2020-05-04 RX ORDER — BACLOFEN 100 %
2.5 POWDER (GRAM) MISCELLANEOUS EVERY 12 HOURS
Refills: 0 | Status: DISCONTINUED | OUTPATIENT
Start: 2020-05-04 | End: 2020-06-05

## 2020-05-04 RX ORDER — ACETAMINOPHEN 500 MG
1000 TABLET ORAL EVERY 12 HOURS
Refills: 0 | Status: DISCONTINUED | OUTPATIENT
Start: 2020-05-04 | End: 2020-05-06

## 2020-05-04 RX ORDER — TRAZODONE HCL 50 MG
100 TABLET ORAL AT BEDTIME
Refills: 0 | Status: DISCONTINUED | OUTPATIENT
Start: 2020-05-04 | End: 2020-06-05

## 2020-05-04 RX ORDER — DORZOLAMIDE HYDROCHLORIDE 20 MG/ML
1 SOLUTION/ DROPS OPHTHALMIC THREE TIMES A DAY
Refills: 0 | Status: DISCONTINUED | OUTPATIENT
Start: 2020-05-04 | End: 2020-06-05

## 2020-05-04 RX ORDER — LEVOTHYROXINE SODIUM 125 MCG
50 TABLET ORAL DAILY
Refills: 0 | Status: DISCONTINUED | OUTPATIENT
Start: 2020-05-04 | End: 2020-05-05

## 2020-05-04 RX ADMIN — GABAPENTIN 300 MILLIGRAM(S): 400 CAPSULE ORAL at 14:40

## 2020-05-04 RX ADMIN — Medication 2: at 12:03

## 2020-05-04 RX ADMIN — Medication 500 MILLIGRAM(S): at 09:27

## 2020-05-04 RX ADMIN — Medication 1: at 22:16

## 2020-05-04 RX ADMIN — Medication 650 MILLIGRAM(S): at 05:33

## 2020-05-04 RX ADMIN — Medication 650 MILLIGRAM(S): at 14:05

## 2020-05-04 RX ADMIN — Medication 500 MILLIGRAM(S): at 01:17

## 2020-05-04 RX ADMIN — CHLORHEXIDINE GLUCONATE 1 APPLICATION(S): 213 SOLUTION TOPICAL at 11:11

## 2020-05-04 RX ADMIN — ATORVASTATIN CALCIUM 10 MILLIGRAM(S): 80 TABLET, FILM COATED ORAL at 22:28

## 2020-05-04 RX ADMIN — Medication 1 TABLET(S): at 12:03

## 2020-05-04 RX ADMIN — Medication 100 MILLIGRAM(S): at 22:29

## 2020-05-04 RX ADMIN — Medication 650 MILLIGRAM(S): at 22:29

## 2020-05-04 RX ADMIN — HEPARIN SODIUM 7500 UNIT(S): 5000 INJECTION INTRAVENOUS; SUBCUTANEOUS at 05:33

## 2020-05-04 RX ADMIN — GABAPENTIN 300 MILLIGRAM(S): 400 CAPSULE ORAL at 22:28

## 2020-05-04 RX ADMIN — AMLODIPINE BESYLATE 10 MILLIGRAM(S): 2.5 TABLET ORAL at 05:33

## 2020-05-04 RX ADMIN — BRIMONIDINE TARTRATE 1 DROP(S): 2 SOLUTION/ DROPS OPHTHALMIC at 22:28

## 2020-05-04 RX ADMIN — Medication 2.5 MILLIGRAM(S): at 17:32

## 2020-05-04 RX ADMIN — Medication 2: at 17:33

## 2020-05-04 RX ADMIN — DORZOLAMIDE HYDROCHLORIDE 1 DROP(S): 20 SOLUTION/ DROPS OPHTHALMIC at 22:28

## 2020-05-04 RX ADMIN — Medication 100 GRAM(S): at 11:12

## 2020-05-04 RX ADMIN — PANTOPRAZOLE SODIUM 40 MILLIGRAM(S): 20 TABLET, DELAYED RELEASE ORAL at 17:31

## 2020-05-04 RX ADMIN — Medication 4 UNIT(S): at 17:32

## 2020-05-04 RX ADMIN — Medication 1000 MILLIGRAM(S): at 17:30

## 2020-05-04 RX ADMIN — Medication 4 UNIT(S): at 12:04

## 2020-05-04 RX ADMIN — HEPARIN SODIUM 7500 UNIT(S): 5000 INJECTION INTRAVENOUS; SUBCUTANEOUS at 14:04

## 2020-05-04 RX ADMIN — HEPARIN SODIUM 7500 UNIT(S): 5000 INJECTION INTRAVENOUS; SUBCUTANEOUS at 22:29

## 2020-05-04 RX ADMIN — Medication 4 UNIT(S): at 08:32

## 2020-05-04 RX ADMIN — GABAPENTIN 200 MILLIGRAM(S): 400 CAPSULE ORAL at 05:33

## 2020-05-04 RX ADMIN — PANTOPRAZOLE SODIUM 40 MILLIGRAM(S): 20 TABLET, DELAYED RELEASE ORAL at 05:33

## 2020-05-04 RX ADMIN — Medication 2: at 08:32

## 2020-05-04 RX ADMIN — INSULIN GLARGINE 10 UNIT(S): 100 INJECTION, SOLUTION SUBCUTANEOUS at 22:27

## 2020-05-04 NOTE — ADVANCED PRACTICE NURSE CONSULT - ASSESSMENT
Assisted pt to bathroom where he emptied ostomy appliance with minimal verbal prompting. Pt then removed appliance in order to place new one. Pt had already cut ostomy skin barrier before emptying appliance. Stoma ring applied around stoma to prevent leakage due to stoma being flush to skin. Small amount of mucocutaneous separation noted from 4-6 o'clock. Pt applied stoma ring but had difficulty placing appliance around stoma due to distension of abdomen and poor eyesight. Stool light brown, semi-thick.

## 2020-05-04 NOTE — PROGRESS NOTE ADULT - ATTENDING COMMENTS
Glucoses have risen in the past 24 hours, with most of yesterday's fingersticks in the 200-250 range--though somewhat better today (183/156).  Reason for the exacerbation of hyperglycemia is unclear.  Will increase the Lantus slightly to 10 units, the lispro to 6 units AC.

## 2020-05-04 NOTE — PROGRESS NOTE ADULT - ATTENDING COMMENTS
prerenal MEG thought to be 2/2 high ostomy output/intravascular volume depletion.   BUN uptrending - please check urine Na, urine Cr to help assess whether he needs more fluid. BPs are elevated going against volume depletion.

## 2020-05-04 NOTE — PROGRESS NOTE ADULT - PROBLEM SELECTOR PLAN 5
Normocytic likely 2/2 chronic disease + kidney dysfunction vs less likely acute blood loss  -monitor hb, transfuse <7  -s/p 4U pRBCs total this admission as of 4/24  -pantoprazole 40mg BID   -monitor for signs of bleeding   -GI following and planned for video capsule endoscopy this admission, however patient refused

## 2020-05-04 NOTE — PROGRESS NOTE ADULT - PROBLEM SELECTOR PLAN 1
4/12 COVID+   -s/p hydroxychloroquine + azithromycin 4/13-4/16  -stable respiratory status on room air, continue to monitor respiratory status  -inflammatory markers with continued downtrend  -isolation precautions

## 2020-05-04 NOTE — PROGRESS NOTE ADULT - PROBLEM SELECTOR PLAN 10
F: none  E: replete prn  N: low fiber diet  GI ppx: protonix BID   DVT ppx: holding heparin for possible bleeding; DVT neg by ultrasound

## 2020-05-04 NOTE — PROGRESS NOTE ADULT - PROBLEM SELECTOR PLAN 6
Per patient, home regimen: gabapentin 1200mg TID, which is well over daily limit for gabapentin use  -CrCl 25-30; gabapentin 200mg qd, continue to watch for improvement in renal function and adjust dose accordingly

## 2020-05-04 NOTE — PROGRESS NOTE ADULT - SUBJECTIVE AND OBJECTIVE BOX
INTERVAL HPI/OVERNIGHT EVENTS:    Patient is a 60y old  Male who presents with a chief complaint of Toxic Megacolon (04 May 2020 10:27)      Pt reports the following symptoms:    CONSTITUTIONAL:  Negative fever or chills, feels well, good appetite  EYES:  Negative  blurry vision or double vision  CARDIOVASCULAR:  Negative for chest pain or palpitations  RESPIRATORY:  Negative for cough, wheezing, or SOB   GASTROINTESTINAL:  Negative for nausea, vomiting, diarrhea, constipation, or abdominal pain  GENITOURINARY:  Negative frequency, urgency or dysuria  NEUROLOGIC:  No headache, confusion, dizziness, lightheadedness    MEDICATIONS  (STANDING):  amLODIPine   Tablet 10 milliGRAM(s) Oral daily  atorvastatin 10 milliGRAM(s) Oral at bedtime  chlorhexidine 2% Cloths 1 Application(s) Topical daily  dextrose 5%. 1000 milliLiter(s) (50 mL/Hr) IV Continuous <Continuous>  dextrose 50% Injectable 12.5 Gram(s) IV Push once  dextrose 50% Injectable 25 Gram(s) IV Push once  dextrose 50% Injectable 25 Gram(s) IV Push once  diphenoxylate/atropine 1 Tablet(s) Oral daily  gabapentin 300 milliGRAM(s) Oral every 8 hours  heparin   Injectable 7500 Unit(s) SubCutaneous every 8 hours  insulin glargine Injectable (LANTUS) 8 Unit(s) SubCutaneous at bedtime  insulin lispro (HumaLOG) corrective regimen sliding scale   SubCutaneous at bedtime  insulin lispro (HumaLOG) corrective regimen sliding scale   SubCutaneous three times a day before meals  insulin lispro Injectable (HumaLOG) 4 Unit(s) SubCutaneous three times a day before meals  ondansetron Injectable 4 milliGRAM(s) IV Push once  pantoprazole    Tablet 40 milliGRAM(s) Oral every 12 hours  rifAXIMin 550 milliGRAM(s) Oral two times a day  sodium bicarbonate 650 milliGRAM(s) Oral three times a day  traZODone 100 milliGRAM(s) Oral at bedtime    MEDICATIONS  (PRN):  acetaminophen   Tablet .. 1000 milliGRAM(s) Oral every 12 hours PRN Mild Pain (1 - 3), Moderate Pain (4 - 6)  dextrose 40% Gel 15 Gram(s) Oral once PRN Blood Glucose LESS THAN 70 milliGRAM(s)/deciliter  glucagon  Injectable 1 milliGRAM(s) IntraMuscular once PRN Glucose LESS THAN 70 milligrams/deciliter      PHYSICAL EXAM  Vital Signs Last 24 Hrs  T(C): 36.8 (04 May 2020 06:59), Max: 36.8 (04 May 2020 06:59)  T(F): 98.2 (04 May 2020 06:59), Max: 98.2 (04 May 2020 06:59)  HR: 83 (04 May 2020 06:59) (75 - 83)  BP: 159/71 (04 May 2020 06:59) (155/67 - 159/71)  BP(mean): --  RR: 18 (04 May 2020 06:59) (18 - 18)  SpO2: 97% (04 May 2020 06:59) (97% - 98%)    Constitutional: wn/wd in NAD.   HEENT: NCAT, MMM, OP clear, EOMI, no proptosis or lid retraction  Neck: no thyromegaly or palpable thyroid nodules   Respiratory: lungs CTAB.  Cardiovascular: regular rhythm, normal S1 and S2, no audible murmurs, no peripheral edema  GI: soft, NT/ND, no masses/HSM appreciated.  Neurology: no tremors, DTR 2+  Skin: no visible rashes/lesions  Psychiatric: AAO x 3, normal affect/mood.    LABS:                        7.2    7.31  )-----------( 294      ( 04 May 2020 07:10 )             22.7     05-04    135  |  100  |  25<H>  ----------------------------<  154<H>  4.5   |  23  |  2.41<H>    Ca    9.0      04 May 2020 07:10  Phos  3.7     05-04  Mg     1.3     05-04    TPro  7.5  /  Alb  3.2<L>  /  TBili  0.2  /  DBili  x   /  AST  21  /  ALT  18  /  AlkPhos  107  05-04        Thyroid Stimulating Hormone, Serum: 3.509 uIU/mL (05-03 @ 06:07)  Thyroid Stimulating Hormone, Serum: 3.325 uIU/mL (04-29 @ 06:17)  Thyroid Stimulating Hormone, Serum: 2.813 uIU/mL (04-14 @ 07:28)  Thyroid Stimulating Hormone, Serum: 2.877 uIU/mL (04-14 @ 00:34)      HbA1C: 6.9 % (04-05 @ 06:59)  7.6 % (03-31 @ 05:58)    CAPILLARY BLOOD GLUCOSE      POCT Blood Glucose.: 156 mg/dL (04 May 2020 11:51)  POCT Blood Glucose.: 183 mg/dL (04 May 2020 07:50)  POCT Blood Glucose.: 216 mg/dL (03 May 2020 21:19)  POCT Blood Glucose.: 250 mg/dL (03 May 2020 17:24)      Insulin Sliding Scale requirements X 24 Hours:    RADIOLOGY & ADDITIONAL TESTS:    A/P: 60y Male with history of DM type II presenting for       1.  DM -     Please continue           units lantus at bedtime  / in the morning and        units lispro with meals and lispro moderate / low dose sliding scale 4 times daily with meals and at bedtime.  Please continue consistent carbohydrate diet.      Goal FSG is   Will continue to monitor   For discharge, pt can continue    Pt can follow up at discharge with [facility] by calling  to make an appointment.   Will discuss case with     and update primary team INTERVAL HPI/OVERNIGHT EVENTS:    Patient is a 60y old  Male who presents with a chief complaint of Toxic Megacolon (04 May 2020 10:27)  Spoke to the primary team taking care of the patient.  No acute events  Saturating well on RA  Surgery recommended USG abdomen - mild ascites. GB wall thickening as well.  TSh was 3.5 and free T4 was 1.01 on 5/3  FSg and insulin administration reviewed  FSG & Insulin received:  Yesterday:  pre-dinner fs, 4 nutritional lispro   units +4   units lispro SS  bedtime fs, 8 lantus   units + 2   units lispro SS  Today:  pre-breakfast fs, 4 nutritional lispro   units+ 2   units lispro SS  pre-lunch fs    ROS unable to be obtained    MEDICATIONS  (STANDING):  amLODIPine   Tablet 10 milliGRAM(s) Oral daily  atorvastatin 10 milliGRAM(s) Oral at bedtime  chlorhexidine 2% Cloths 1 Application(s) Topical daily  dextrose 5%. 1000 milliLiter(s) (50 mL/Hr) IV Continuous <Continuous>  dextrose 50% Injectable 12.5 Gram(s) IV Push once  dextrose 50% Injectable 25 Gram(s) IV Push once  dextrose 50% Injectable 25 Gram(s) IV Push once  diphenoxylate/atropine 1 Tablet(s) Oral daily  gabapentin 300 milliGRAM(s) Oral every 8 hours  heparin   Injectable 7500 Unit(s) SubCutaneous every 8 hours  insulin glargine Injectable (LANTUS) 8 Unit(s) SubCutaneous at bedtime  insulin lispro (HumaLOG) corrective regimen sliding scale   SubCutaneous at bedtime  insulin lispro (HumaLOG) corrective regimen sliding scale   SubCutaneous three times a day before meals  insulin lispro Injectable (HumaLOG) 4 Unit(s) SubCutaneous three times a day before meals  ondansetron Injectable 4 milliGRAM(s) IV Push once  pantoprazole    Tablet 40 milliGRAM(s) Oral every 12 hours  rifAXIMin 550 milliGRAM(s) Oral two times a day  sodium bicarbonate 650 milliGRAM(s) Oral three times a day  traZODone 100 milliGRAM(s) Oral at bedtime    MEDICATIONS  (PRN):  acetaminophen   Tablet .. 1000 milliGRAM(s) Oral every 12 hours PRN Mild Pain (1 - 3), Moderate Pain (4 - 6)  dextrose 40% Gel 15 Gram(s) Oral once PRN Blood Glucose LESS THAN 70 milliGRAM(s)/deciliter  glucagon  Injectable 1 milliGRAM(s) IntraMuscular once PRN Glucose LESS THAN 70 milligrams/deciliter      PHYSICAL EXAM  Vital Signs Last 24 Hrs  T(C): 36.8 (04 May 2020 06:59), Max: 36.8 (04 May 2020 06:59)  T(F): 98.2 (04 May 2020 06:59), Max: 98.2 (04 May 2020 06:59)  HR: 83 (04 May 2020 06:59) (75 - 83)  BP: 159/71 (04 May 2020 06:59) (155/67 - 159/71)  BP(mean): --  RR: 18 (04 May 2020 06:59) (18 - 18)  SpO2: 97% (04 May 2020 06:59) (97% - 98%)    Due to the nature of this patient’s COVID-19 isolation status (either confirmed or suspected), this note was prepared without a bedside physical examination to prevent spread of infection and to conserve personal protective equipment during this nationwide pandemic. If possible, direct patient communication occurred via electronic measures or telephone conversation. Examination highlights were provided by a bedside nurse wearing personal protective equipment and review of pertinent medical records. Objective data (vital signs, urine output, lab results, imaging studies, medications, etc) were reviewed in detail. Face to face visits and physical examination have been limited only to patients for whom it is required for medical decision making.    LABS:                        7.2    7.31  )-----------( 294      ( 04 May 2020 07:10 )             22.7     05-    135  |  100  |  25<H>  ----------------------------<  154<H>  4.5   |  23  |  2.41<H>    Ca    9.0      04 May 2020 07:10  Phos  3.7     05-04  Mg     1.3     05-    TPro  7.5  /  Alb  3.2<L>  /  TBili  0.2  /  DBili  x   /  AST  21  /  ALT  18  /  AlkPhos  107  05-04        Thyroid Stimulating Hormone, Serum: 3.509 uIU/mL (-03 @ 06:07)  Thyroid Stimulating Hormone, Serum: 3.325 uIU/mL ( @ 06:17)  Thyroid Stimulating Hormone, Serum: 2.813 uIU/mL ( @ 07:28)  Thyroid Stimulating Hormone, Serum: 2.877 uIU/mL ( @ 00:34)      HbA1C: 6.9 % ( @ 06:59)  7.6 % ( @ 05:58)    CAPILLARY BLOOD GLUCOSE      POCT Blood Glucose.: 156 mg/dL (04 May 2020 11:51)  POCT Blood Glucose.: 183 mg/dL (04 May 2020 07:50)  POCT Blood Glucose.: 216 mg/dL (03 May 2020 21:19)  POCT Blood Glucose.: 250 mg/dL (03 May 2020 17:24)      Insulin Sliding Scale requirements X 24 Hours:    RADIOLOGY & ADDITIONAL TESTS:    A/P 60yMale with hx of DM now with COVID, worsening renal function, moderate hyperglycemia.     1.  DM: type 2  Please increase Lantus to 10 units at bedtime  PLease increase to Lispro 6 units tid with meals.   Continue lispro moderate dose scale with meals and at bedtime.   Continue consistent carb diet  FSG Goal 100-180    2.  Hyperlipidemia - goal LDL < 70  - on lipitor 10 mg once daily    3.  Obesity - outpatient medical management.      4.  Hypothyroidism   - TSH on admission 2.8.   - levothyroxine 50mcg discontinued on  to assess necessity as there is no evidence of autoimmune hypothyroidism at this time.   - Repeat TSH 3.32 and free T4 1.03 ().   - TSH 3.5 and Free T4 1.01 on 5/3     Case seen and discussed with  and updated the primary team.  Pt can follow up at discharge with [facility] by calling  to make an appointment.

## 2020-05-04 NOTE — PROGRESS NOTE ADULT - PROBLEM SELECTOR PLAN 3
Now s/p 3/31/20 ex lap, subtotal colectomy+ileostomy  course c/b fever and purulent wound infection (4/9), s/p vanc/zosyn.   -s/p laparotomy with wound vac in place (gen surg following)   -ileostomy in place draining brown stool with no signs of bleeding  -lomotil- low dose to prevent constipation/ hepatic encephalopthy  -general surgery following, appreciate recs  -abdominal u/s for increasing abdominal ascites: f/u  -woundcare as below      #s/p wound vac  -woundcare NP following and continue teaching to patient on wound vac care and instructions  -wet to dry dressing, surgery following Now s/p 3/31/20 ex lap, subtotal colectomy+ileostomy  course c/b fever and purulent wound infection (4/9), s/p vanc/zosyn.   -s/p laparotomy with wound vac in place (gen surg following)   -ileostomy in place draining brown stool with no signs of bleeding  -lomotil- low dose to prevent constipation/ hepatic encephalopthy  -general surgery following, appreciate recs  -abdominal u/s for increasing abdominal ascites: f/u  -woundcare as below      #s/p wound vac  -woundcare NP following and continue teaching patient   -wet to dry dressing, surgery following

## 2020-05-04 NOTE — PROGRESS NOTE ADULT - PROBLEM SELECTOR PLAN 4
Nonoliguric MEG likely 2/2 pre-renal + intrinsic 2/2 COVID-19 +/- contrast exposure +/- vancomycin toxicity  -Cr downtrending  -avoid nephrotoxic agents  -gentle IV hydration if high GI output  -monitor I/Os   -renal following - appreciate recs    #Non-anion gap metabolic acidosis  -sodium bicarbonate TID

## 2020-05-04 NOTE — PROGRESS NOTE ADULT - ATTENDING COMMENTS
24Hr Events: None  C/o neuropathic pain, high output ostomy  Afeb, 97% on RA, acites, ostomy in place, s/p toe amputations, 2 in area of skin darkening at lateral aspect of R foot stump  Hb 7.2, ddimer 2200, Cr 2.41, CRP 8 (down-trending)  60yoM w/ h/o HTN, DM c/b neuropathy, IVDU, ETOH liver cirrhosis p/w fall found to have toxic megacolon and pneumatosis s/p ex lap + subtotal colectomy, ileostomy w/ course c/b COVID, MEG on CKD, anemia requiring xfusions, ascites s/p paracentesis awaiting dispo.  Consult podiatry for R foot wound  Surgery, ostomy RN for management of ostomy  Renal fnxn mildly improved, adjust dosing of meds  Supportive care for COVID, not requiring O2  Monitor FS  No need for xfusion  Shelter when stable

## 2020-05-04 NOTE — PROGRESS NOTE ADULT - SUBJECTIVE AND OBJECTIVE BOX
POST-OP DAY: X s/p      SUBJECTIVE: Patient seen and examined bedside by chief resident.    amLODIPine   Tablet 10 milliGRAM(s) Oral daily  heparin   Injectable 7500 Unit(s) SubCutaneous every 8 hours  rifAXIMin 550 milliGRAM(s) Oral two times a day    MEDICATIONS  (PRN):  acetaminophen   Tablet .. 500 milliGRAM(s) Oral every 6 hours PRN Temp greater or equal to 38C (100.4F), Mild Pain (1 - 3)  dextrose 40% Gel 15 Gram(s) Oral once PRN Blood Glucose LESS THAN 70 milliGRAM(s)/deciliter  glucagon  Injectable 1 milliGRAM(s) IntraMuscular once PRN Glucose LESS THAN 70 milligrams/deciliter  traZODone 50 milliGRAM(s) Oral at bedtime PRN Sleep      I&O's Detail    03 May 2020 07:01  -  04 May 2020 07:00  --------------------------------------------------------  IN:  Total IN: 0 mL    OUT:    Ileostomy: 300 mL    Voided: 1050 mL  Total OUT: 1350 mL    Total NET: -1350 mL          Vital Signs Last 24 Hrs  T(C): 36.8 (04 May 2020 06:59), Max: 36.8 (04 May 2020 06:59)  T(F): 98.2 (04 May 2020 06:59), Max: 98.2 (04 May 2020 06:59)  HR: 83 (04 May 2020 06:59) (75 - 85)  BP: 159/71 (04 May 2020 06:59) (155/67 - 162/71)  BP(mean): --  RR: 18 (04 May 2020 06:59) (18 - 18)  SpO2: 97% (04 May 2020 06:59) (97% - 98%)      LABS:                        7.2    7.31  )-----------( 294      ( 04 May 2020 07:10 )             22.7     05-04    135  |  100  |  25<H>  ----------------------------<  154<H>  4.5   |  23  |  2.41<H>    Ca    9.0      04 May 2020 07:10  Phos  3.7     05-04  Mg     1.3     05-04    TPro  7.5  /  Alb  3.2<L>  /  TBili  0.2  /  DBili  x   /  AST  21  /  ALT  18  /  AlkPhos  107  05-04

## 2020-05-04 NOTE — PROGRESS NOTE ADULT - PROBLEM SELECTOR PLAN 9
-home half dose trazodone 50mg qhs prn (100mg home dose)    #suicidal ideation   Pt endorsing SI without a plan.   -psychiatry consult cleared for discharge; no additional psychiatric medications, continue trazodone; no utility in inpatient psych, likely adjustment disorder with depressive mood related to social issues  -social work re: going back to shelter

## 2020-05-04 NOTE — PROGRESS NOTE ADULT - ASSESSMENT
61 y/o M with PMH of HTN, DM complicated by neuropathy, IVDU, ETOH liver cirrhosis, who presents to [facility] for fall at home; found to have toxic megacolon and pneumatosis; underwent ex lap and subtotal colectomy, and ileostomy on 3/31. COVID +       # Resolving non-oliguric MEG  - BUN/Cr 25/2.4 << 19/2.4   - uop 1.0 L, ileostomy 0.7 L, net neg 1.3 L/ 24hr  - net neg 23.0 L since admission   - acceptable electrolytes  - check urine lytes to side the need of hydration

## 2020-05-04 NOTE — PROGRESS NOTE ADULT - ASSESSMENT
59 y/o M with PMH of HTN, DM complicated by neuropathy, EtOH liver cirrhosis, presenting w/ pneumatosis of unknown etiology, s/p sub total colectomy and end ileostomy on 3/31, c/b fever and purulent wound infection (4/9) now s/p paracentesis 4/13 found to be COVID (+). Clinically stable. In no acute distress. Abdominal ultrasound 5/3 found mild ascites    - Pain/nausea control  - f/o Ileostomy output  - c/w Lomotil  - Rest of care per primary team

## 2020-05-04 NOTE — PROGRESS NOTE ADULT - ASSESSMENT
60M PMH HTN, DM + neuropathy, IVDU, ETOH liver cirrhosis, [facility]; found with toxic megacolon and pneumatosis; now s/p ex lap + subtotal colectomy, ileostomy (3/31). Developed COVID 19 while in the hospital, but subsequent test was positive, patient was transferred to [facility] tele COVID unit without need for supplemental oxygen. Now on RA, on wet to dry dressing, walked with PT, MEG improving, H/H low but stable s/p multiple units of PRBC, following up heme work up to determine if there is a source of bleed vs a hematologic cause for anemia; Pt stable for dispo to [facility] when set up with  when he is able to take care of his wounds. Pt currently needs daily wound/ostomy checks. Shelter [facility] only has 1 x a week wound check available

## 2020-05-04 NOTE — PROGRESS NOTE ADULT - PROBLEM SELECTOR PLAN 2
Decompensated cirrhosis c/b ascites likely 2/2 alcohol abuse  -SBP ruled out, hepatic encephalopathy present on admission now resolved  -CT abd/pelvis with persistent large ascites and peritonitis; bilateral pleural effusions and pericardial effusion  -s/p paracentesis on 4/10 with negative cytology but not sent for cell count/culture, however was on zosyn for 2 weeks so GI with no concerns for SBP   -s/p repeat paracentesis 4/19, but unable to aspirate significant amount of fluid   -SAAG 1.4 consistent with portal hypertension as etiology of ascites  -Rifaximin 550mg bid for hepatic encephalopathy   -holding lactulose given high ostomy output  -Hgb slowly dropping; ileostomy without melanotic stool; light brown/green soft stool  -pt refusing video capsule endoscopy this admission   -maximum tylenol dosing 2g per day, will switch to 1 g BID per patient request   -screening for HCC outpatient with hepatology follow-up

## 2020-05-04 NOTE — PROGRESS NOTE ADULT - PROBLEM SELECTOR PLAN 7
Type 2 DM, endocrinology following   -lantus 5U qhs   -lispro 4U TID pre-meal  -mISS    #hypothyroidism   -TSH 2.813; TPO and antithyroid Abs neg   -levothyroxine 50mcg

## 2020-05-04 NOTE — PROGRESS NOTE ADULT - SUBJECTIVE AND OBJECTIVE BOX
INTERNAL MEDICINE PA PROGRESS NOTE     INTERVAL HPI/OVERNIGHT EVENTS: none    SUBJECTIVE: Patient seen and examined at bedside. Largely positive ROS. Complaining of pain "all over" - back, abdomen, lower extremities/feet, worst in the lower extremities/feet. Thinks that he has not been given gabapentin, however it is ordered. Requesting a higher dose of tylenol. Complained that he had some nausea before falling asleep last night. Also complaining of occasional shortness of breath, despite having normal O2 saturation. Also complains of being cold and needing multiple blankets. Denies any chest pain, cough, fever, or headaches.     ROS negative unless otherwise specified.    OBJECTIVE:    VITAL SIGNS:  ICU Vital Signs Last 24 Hrs  T(C): 36.8 (04 May 2020 06:59), Max: 36.8 (04 May 2020 06:59)  T(F): 98.2 (04 May 2020 06:59), Max: 98.2 (04 May 2020 06:59)  HR: 83 (04 May 2020 06:59) (75 - 85)  BP: 159/71 (04 May 2020 06:59) (155/67 - 162/71)  RR: 18 (04 May 2020 06:59) (18 - 18)  SpO2: 97% (04 May 2020 06:59) (97% - 98%)        05-03 @ 07:01  -  05-04 @ 07:00  --------------------------------------------------------  IN: 0 mL / OUT: 1350 mL / NET: -1350 mL      CAPILLARY BLOOD GLUCOSE      POCT Blood Glucose.: 183 mg/dL (04 May 2020 07:50)      PHYSICAL EXAM:    General: NAD  HEENT: NC/AT; EMOI, clear conjunctiva  Neck: supple  Respiratory: CTA b/l  Cardiovascular: +S1/S2; RRR  Abdomen: soft, NT, protuberant; +BS x4; Ostomy pink, bag with large amount light brown opaque stool  Extremities: WWP, 2+ peripheral pulses b/l; no LE edema  Skin: normal color and turgor; no rash; R foot with toe amputations, 50 cent piece sized closed ulcer over lateral distal foot.   Neurological: A&O x 3, No focal deficits     MEDICATIONS:  MEDICATIONS  (STANDING):  amLODIPine   Tablet 10 milliGRAM(s) Oral daily  atorvastatin 10 milliGRAM(s) Oral at bedtime  chlorhexidine 2% Cloths 1 Application(s) Topical daily  dextrose 5%. 1000 milliLiter(s) (50 mL/Hr) IV Continuous <Continuous>  dextrose 50% Injectable 12.5 Gram(s) IV Push once  dextrose 50% Injectable 25 Gram(s) IV Push once  dextrose 50% Injectable 25 Gram(s) IV Push once  diphenoxylate/atropine 1 Tablet(s) Oral daily  gabapentin 200 milliGRAM(s) Oral every 8 hours  heparin   Injectable 7500 Unit(s) SubCutaneous every 8 hours  insulin glargine Injectable (LANTUS) 8 Unit(s) SubCutaneous at bedtime  insulin lispro (HumaLOG) corrective regimen sliding scale   SubCutaneous at bedtime  insulin lispro (HumaLOG) corrective regimen sliding scale   SubCutaneous three times a day before meals  insulin lispro Injectable (HumaLOG) 4 Unit(s) SubCutaneous three times a day before meals  magnesium sulfate  IVPB 4 Gram(s) IV Intermittent once  ondansetron Injectable 4 milliGRAM(s) IV Push once  pantoprazole    Tablet 40 milliGRAM(s) Oral every 12 hours  rifAXIMin 550 milliGRAM(s) Oral two times a day  sodium bicarbonate 650 milliGRAM(s) Oral three times a day    MEDICATIONS  (PRN):  acetaminophen   Tablet .. 500 milliGRAM(s) Oral every 6 hours PRN Temp greater or equal to 38C (100.4F), Mild Pain (1 - 3)  dextrose 40% Gel 15 Gram(s) Oral once PRN Blood Glucose LESS THAN 70 milliGRAM(s)/deciliter  glucagon  Injectable 1 milliGRAM(s) IntraMuscular once PRN Glucose LESS THAN 70 milligrams/deciliter  traZODone 50 milliGRAM(s) Oral at bedtime PRN Sleep      ALLERGIES:  Allergies    No Known Allergies    Intolerances        LABS:                        7.2    7.31  )-----------( 294      ( 04 May 2020 07:10 )             22.7     05-04    135  |  100  |  25<H>  ----------------------------<  154<H>  4.5   |  23  |  2.41<H>    Ca    9.0      04 May 2020 07:10  Phos  3.7     05-04  Mg     1.3     05-04    TPro  7.5  /  Alb  3.2<L>  /  TBili  0.2  /  DBili  x   /  AST  21  /  ALT  18  /  AlkPhos  107  05-04      D-dimer: D-Dimer Assay, Quantitative: 2202 ng/mL DDU (05-04-20 @ 07:10)  D-Dimer Assay, Quantitative: 1987 ng/mL DDU (05-02-20 @ 07:06)    CRP: C-Reactive Protein, Serum: 8.32 mg/dL (05-04-20 @ 07:10)  C-Reactive Protein, Serum: 11.73 mg/dL (05-02-20 @ 07:06)    Ferritin: Ferritin, Serum: 825 ng/mL (05-04-20 @ 07:10)  Ferritin, Serum: 881 ng/mL (05-02-20 @ 07:06)    G6PD: Glucose-6-Phosphate Dehydrogenase: 20.6 U/g Hgb (04-14-20 @ 11:02)    RADIOLOGY & ADDITIONAL TESTS: Reviewed.

## 2020-05-04 NOTE — PROGRESS NOTE ADULT - SUBJECTIVE AND OBJECTIVE BOX
resolving non-oliguric MEG   uop 1.0 L, ileostomy 0.7 L, net neg 1.3 L/ 24hr  net neg 23.0 L since admission       Meds:  acetaminophen   Tablet .. 1000 every 12 hours PRN  amLODIPine   Tablet 10 daily  atorvastatin 10 at bedtime  chlorhexidine 2% Cloths 1 daily  dextrose 40% Gel 15 once PRN  dextrose 5%. 1000 <Continuous>  dextrose 50% Injectable 12.5 once  dextrose 50% Injectable 25 once  dextrose 50% Injectable 25 once  diphenoxylate/atropine 1 daily  gabapentin 300 every 8 hours  glucagon  Injectable 1 once PRN  heparin   Injectable 7500 every 8 hours  insulin glargine Injectable (LANTUS) 8 at bedtime  insulin lispro (HumaLOG) corrective regimen sliding scale  at bedtime  insulin lispro (HumaLOG) corrective regimen sliding scale  three times a day before meals  insulin lispro Injectable (HumaLOG) 4 three times a day before meals  ondansetron Injectable 4 once  pantoprazole    Tablet 40 every 12 hours  rifAXIMin 550 two times a day  sodium bicarbonate 650 three times a day  traZODone 50 at bedtime PRN      T(C): , Max: 36.8 (05-04-20 @ 06:59)  T(F): , Max: 98.2 (05-04-20 @ 06:59)  HR: 83 (05-04-20 @ 06:59)  BP: 159/71 (05-04-20 @ 06:59)  BP(mean): --  RR: 18 (05-04-20 @ 06:59)  SpO2: 97% (05-04-20 @ 06:59)  Wt(kg): --    05-03 @ 07:01  -  05-04 @ 07:00  --------------------------------------------------------  IN: 0 mL / OUT: 1350 mL / NET: -1350 mL    05-04 @ 07:01  -  05-04 @ 14:24  --------------------------------------------------------  IN: 0 mL / OUT: 125 mL / NET: -125 mL          Review of Systems:  deferred due to covid isolation protocol    PHYSICAL EXAM:  deferred due to covid isolation protocol      LABS:                        7.2    7.31  )-----------( 294      ( 04 May 2020 07:10 )             22.7     05-04    135  |  100  |  25<H>  ----------------------------<  154<H>  4.5   |  23  |  2.41<H>    Ca    9.0      04 May 2020 07:10  Phos  3.7     05-04  Mg     1.3     05-04    TPro  7.5  /  Alb  3.2<L>  /  TBili  0.2  /  DBili  x   /  AST  21  /  ALT  18  /  AlkPhos  107  05-04                RADIOLOGY & ADDITIONAL STUDIES:      < from: US Abdomen Limited (05.03.20 @ 16:57) >    EXAM:  US ABDOMEN LIMITED                          PROCEDURE DATE:  05/03/2020          INTERPRETATION:    Right upper quadrant ultrasound    History: Status post recent ex-lap. Known history of liver cirrhosis. Covid positive. Concern for ascites..    Prior studies: Comparison made to recent CT of the abdomen and pelvis from 4/19/2020 and 4/16/2020 and ultrasound of the abdomen from 4/6/2020.    Findings:     Liver: Normal size. Normal echogenicity. There are no focal hepatic lesions.     Bileducts: There is no intrahepatic biliary ductal dilatation. The common duct is normal in caliber, measuring 0.6 cm.     Gallbladder: A few gallstones are identified. There is wall thickening with gallbladder edema measuring 3 mm. No pericholecystic fluid. Unable to assess for sonographic Hernandez's sign as patient status post surgery and on pain medications.    Pancreas: The visualized portions appear normal.    Right kidney: The right kidney is normal in size, measuring 12.1 cm in length. There is normal right renal parenchymal thickness and echogenicity.  There is no right hydronephrosis.  No renal mass is identified. No renal stone is seen.    Ascites: There is mild ascites in all 4 quadrants of the abdomen.    Vessels: The proximal portions of the aorta and inferior vena cava are unremarkable. Normal hepatopetal blood flow demonstrated within main portal vein. Hepatic veins are patent.    Impression:   1. Mild ascites.  2. Gallbladder wall and cholelithiasis. No pericholecystic fluid. Sonographic Hernandez sign cannot be assessed. These findings are indeterminate for acute cholecystitis. Wall thickening may be due to intrinsic and/or extrinsic hepatobiliary etiologies. Correlation can be made with HIDA scintigraphy as clinically indicated to exclude acute cholecystitis.    Thickening which may be secondary to systemic causes including liver disease as noted on patient history.    < end of copied text >

## 2020-05-04 NOTE — PROGRESS NOTE ADULT - PROBLEM SELECTOR PLAN 8
Home regimen: amlodipine 10mg qd, HCTZ, Lisinopril  -continue amlodipine  -hold HCTZ Lisinopril in setting of MEG  -monitor BP    #HLD  Home regimen: pravastatin 40mg qhs  -continue statin as therapeutic interchange for atorvastatin

## 2020-05-05 LAB
ALBUMIN SERPL ELPH-MCNC: 3.2 G/DL — LOW (ref 3.3–5)
ALP SERPL-CCNC: 105 U/L — SIGNIFICANT CHANGE UP (ref 40–120)
ALT FLD-CCNC: 20 U/L — SIGNIFICANT CHANGE UP (ref 10–45)
ANION GAP SERPL CALC-SCNC: 15 MMOL/L — SIGNIFICANT CHANGE UP (ref 5–17)
AST SERPL-CCNC: 21 U/L — SIGNIFICANT CHANGE UP (ref 10–40)
BASOPHILS # BLD AUTO: 0.02 K/UL — SIGNIFICANT CHANGE UP (ref 0–0.2)
BASOPHILS NFR BLD AUTO: 0.3 % — SIGNIFICANT CHANGE UP (ref 0–2)
BILIRUB SERPL-MCNC: <0.2 MG/DL — SIGNIFICANT CHANGE UP (ref 0.2–1.2)
BUN SERPL-MCNC: 25 MG/DL — HIGH (ref 7–23)
CALCIUM SERPL-MCNC: 8.9 MG/DL — SIGNIFICANT CHANGE UP (ref 8.4–10.5)
CHLORIDE SERPL-SCNC: 99 MMOL/L — SIGNIFICANT CHANGE UP (ref 96–108)
CO2 SERPL-SCNC: 21 MMOL/L — LOW (ref 22–31)
CREAT SERPL-MCNC: 2.22 MG/DL — HIGH (ref 0.5–1.3)
CRP SERPL-MCNC: 7.4 MG/DL — HIGH (ref 0–0.4)
D DIMER BLD IA.RAPID-MCNC: 1880 NG/ML DDU — HIGH
EOSINOPHIL # BLD AUTO: 0.23 K/UL — SIGNIFICANT CHANGE UP (ref 0–0.5)
EOSINOPHIL NFR BLD AUTO: 2.9 % — SIGNIFICANT CHANGE UP (ref 0–6)
FERRITIN SERPL-MCNC: 729 NG/ML — HIGH (ref 30–400)
GLUCOSE BLDC GLUCOMTR-MCNC: 107 MG/DL — HIGH (ref 70–99)
GLUCOSE BLDC GLUCOMTR-MCNC: 135 MG/DL — HIGH (ref 70–99)
GLUCOSE BLDC GLUCOMTR-MCNC: 164 MG/DL — HIGH (ref 70–99)
GLUCOSE BLDC GLUCOMTR-MCNC: 190 MG/DL — HIGH (ref 70–99)
GLUCOSE BLDC GLUCOMTR-MCNC: 221 MG/DL — HIGH (ref 70–99)
GLUCOSE SERPL-MCNC: 151 MG/DL — HIGH (ref 70–99)
HCT VFR BLD CALC: 20.5 % — CRITICAL LOW (ref 39–50)
HGB BLD-MCNC: 6.6 G/DL — CRITICAL LOW (ref 13–17)
IMM GRANULOCYTES NFR BLD AUTO: 1.4 % — SIGNIFICANT CHANGE UP (ref 0–1.5)
LYMPHOCYTES # BLD AUTO: 0.79 K/UL — LOW (ref 1–3.3)
LYMPHOCYTES # BLD AUTO: 9.9 % — LOW (ref 13–44)
MAGNESIUM SERPL-MCNC: 1.9 MG/DL — SIGNIFICANT CHANGE UP (ref 1.6–2.6)
MCHC RBC-ENTMCNC: 28.6 PG — SIGNIFICANT CHANGE UP (ref 27–34)
MCHC RBC-ENTMCNC: 32.2 GM/DL — SIGNIFICANT CHANGE UP (ref 32–36)
MCV RBC AUTO: 88.7 FL — SIGNIFICANT CHANGE UP (ref 80–100)
MONOCYTES # BLD AUTO: 0.77 K/UL — SIGNIFICANT CHANGE UP (ref 0–0.9)
MONOCYTES NFR BLD AUTO: 9.6 % — SIGNIFICANT CHANGE UP (ref 2–14)
NEUTROPHILS # BLD AUTO: 6.07 K/UL — SIGNIFICANT CHANGE UP (ref 1.8–7.4)
NEUTROPHILS NFR BLD AUTO: 75.9 % — SIGNIFICANT CHANGE UP (ref 43–77)
NRBC # BLD: 0 /100 WBCS — SIGNIFICANT CHANGE UP (ref 0–0)
PHOSPHATE SERPL-MCNC: 4.1 MG/DL — SIGNIFICANT CHANGE UP (ref 2.5–4.5)
PLATELET # BLD AUTO: 294 K/UL — SIGNIFICANT CHANGE UP (ref 150–400)
POTASSIUM SERPL-MCNC: 4.5 MMOL/L — SIGNIFICANT CHANGE UP (ref 3.5–5.3)
POTASSIUM SERPL-SCNC: 4.5 MMOL/L — SIGNIFICANT CHANGE UP (ref 3.5–5.3)
PROT SERPL-MCNC: 7.2 G/DL — SIGNIFICANT CHANGE UP (ref 6–8.3)
RBC # BLD: 2.31 M/UL — LOW (ref 4.2–5.8)
RBC # FLD: 14.6 % — HIGH (ref 10.3–14.5)
SODIUM SERPL-SCNC: 135 MMOL/L — SIGNIFICANT CHANGE UP (ref 135–145)
WBC # BLD: 7.99 K/UL — SIGNIFICANT CHANGE UP (ref 3.8–10.5)
WBC # FLD AUTO: 7.99 K/UL — SIGNIFICANT CHANGE UP (ref 3.8–10.5)

## 2020-05-05 PROCEDURE — 99232 SBSQ HOSP IP/OBS MODERATE 35: CPT

## 2020-05-05 PROCEDURE — 99233 SBSQ HOSP IP/OBS HIGH 50: CPT

## 2020-05-05 PROCEDURE — 99231 SBSQ HOSP IP/OBS SF/LOW 25: CPT | Mod: GC

## 2020-05-05 RX ORDER — DIPHENOXYLATE HCL/ATROPINE 2.5-.025MG
1 TABLET ORAL DAILY
Refills: 0 | Status: DISCONTINUED | OUTPATIENT
Start: 2020-05-06 | End: 2020-05-12

## 2020-05-05 RX ORDER — INSULIN GLARGINE 100 [IU]/ML
12 INJECTION, SOLUTION SUBCUTANEOUS AT BEDTIME
Refills: 0 | Status: DISCONTINUED | OUTPATIENT
Start: 2020-05-05 | End: 2020-05-06

## 2020-05-05 RX ADMIN — PANTOPRAZOLE SODIUM 40 MILLIGRAM(S): 20 TABLET, DELAYED RELEASE ORAL at 17:40

## 2020-05-05 RX ADMIN — Medication 6 UNIT(S): at 17:39

## 2020-05-05 RX ADMIN — CHLORHEXIDINE GLUCONATE 1 APPLICATION(S): 213 SOLUTION TOPICAL at 11:42

## 2020-05-05 RX ADMIN — GABAPENTIN 300 MILLIGRAM(S): 400 CAPSULE ORAL at 23:58

## 2020-05-05 RX ADMIN — HEPARIN SODIUM 7500 UNIT(S): 5000 INJECTION INTRAVENOUS; SUBCUTANEOUS at 05:39

## 2020-05-05 RX ADMIN — DORZOLAMIDE HYDROCHLORIDE 1 DROP(S): 20 SOLUTION/ DROPS OPHTHALMIC at 05:40

## 2020-05-05 RX ADMIN — GABAPENTIN 300 MILLIGRAM(S): 400 CAPSULE ORAL at 05:38

## 2020-05-05 RX ADMIN — Medication 1000 MILLIGRAM(S): at 11:41

## 2020-05-05 RX ADMIN — DORZOLAMIDE HYDROCHLORIDE 1 DROP(S): 20 SOLUTION/ DROPS OPHTHALMIC at 22:00

## 2020-05-05 RX ADMIN — Medication 650 MILLIGRAM(S): at 05:36

## 2020-05-05 RX ADMIN — GABAPENTIN 300 MILLIGRAM(S): 400 CAPSULE ORAL at 14:37

## 2020-05-05 RX ADMIN — HEPARIN SODIUM 7500 UNIT(S): 5000 INJECTION INTRAVENOUS; SUBCUTANEOUS at 14:38

## 2020-05-05 RX ADMIN — AMLODIPINE BESYLATE 10 MILLIGRAM(S): 2.5 TABLET ORAL at 05:38

## 2020-05-05 RX ADMIN — PANTOPRAZOLE SODIUM 40 MILLIGRAM(S): 20 TABLET, DELAYED RELEASE ORAL at 05:38

## 2020-05-05 RX ADMIN — Medication 2.5 MILLIGRAM(S): at 17:34

## 2020-05-05 RX ADMIN — Medication 2: at 09:42

## 2020-05-05 RX ADMIN — BRIMONIDINE TARTRATE 1 DROP(S): 2 SOLUTION/ DROPS OPHTHALMIC at 22:00

## 2020-05-05 RX ADMIN — Medication 2.5 MILLIGRAM(S): at 05:36

## 2020-05-05 RX ADMIN — INSULIN GLARGINE 12 UNIT(S): 100 INJECTION, SOLUTION SUBCUTANEOUS at 23:48

## 2020-05-05 RX ADMIN — Medication 650 MILLIGRAM(S): at 15:45

## 2020-05-05 RX ADMIN — DORZOLAMIDE HYDROCHLORIDE 1 DROP(S): 20 SOLUTION/ DROPS OPHTHALMIC at 14:38

## 2020-05-05 RX ADMIN — ATORVASTATIN CALCIUM 10 MILLIGRAM(S): 80 TABLET, FILM COATED ORAL at 23:58

## 2020-05-05 RX ADMIN — Medication 6 UNIT(S): at 12:11

## 2020-05-05 RX ADMIN — Medication 50 MICROGRAM(S): at 05:38

## 2020-05-05 RX ADMIN — BRIMONIDINE TARTRATE 1 DROP(S): 2 SOLUTION/ DROPS OPHTHALMIC at 14:37

## 2020-05-05 RX ADMIN — HEPARIN SODIUM 5000 UNIT(S): 5000 INJECTION INTRAVENOUS; SUBCUTANEOUS at 23:58

## 2020-05-05 RX ADMIN — Medication 1 TABLET(S): at 11:41

## 2020-05-05 RX ADMIN — Medication 6 UNIT(S): at 09:45

## 2020-05-05 RX ADMIN — BRIMONIDINE TARTRATE 1 DROP(S): 2 SOLUTION/ DROPS OPHTHALMIC at 05:40

## 2020-05-05 NOTE — PROGRESS NOTE ADULT - PROBLEM SELECTOR PLAN 10
F: none  E: replete prn  N: low fiber, consistent carbs  GI ppx: protonix BID   DVT ppx: holding heparin for possible bleeding; DVT neg by ultrasound

## 2020-05-05 NOTE — PROGRESS NOTE ADULT - PROBLEM SELECTOR PLAN 2
Decompensated cirrhosis c/b ascites likely 2/2 alcohol abuse  -SBP ruled out, hepatic encephalopathy present on admission now resolved  -CT abd/pelvis with persistent large ascites and peritonitis; bilateral pleural effusions and pericardial effusion  -s/p paracentesis on 4/10 with negative cytology but not sent for cell count/culture, however was on zosyn for 2 weeks so GI with no concerns for SBP   -s/p repeat paracentesis 4/19, but unable to aspirate significant amount of fluid   -SAAG 1.4 consistent with portal hypertension as etiology of ascites  -abd u/s 5/3 small ascites   -Rifaximin 550mg bid for hepatic encephalopathy   -holding lactulose given high ostomy output  -Hgb slowly dropping; ileostomy without melanotic stool; brown soft stool  -pt refusing video capsule endoscopy this admission   -maximum tylenol dosing 2g per day, will switch to 1 g BID per patient request   -screening for HCC outpatient with hepatology follow-up

## 2020-05-05 NOTE — PROGRESS NOTE ADULT - PROBLEM SELECTOR PLAN 7
Type 2 DM, endocrinology following   -lantus 10U qhs per endo  -lispro 6U TID pre-meal per endo  -mISS    #hypothyroidism   -TSH 2.813; TPO and antithyroid Abs neg   -levothyroxine 50mcg

## 2020-05-05 NOTE — PROGRESS NOTE ADULT - ASSESSMENT
61 y/o M with PMH of HTN, DM complicated by neuropathy, EtOH liver cirrhosis, presenting w/ pneumatosis of unknown etiology, s/p sub total colectomy and end ileostomy on 3/31, c/b fever and purulent wound infection (4/9) now s/p paracentesis 4/13 found to be COVID (+). Clinically stable. In no acute distress.     -c/w lomotil  -trend ostomy output  -dispo planning and care per medicine

## 2020-05-05 NOTE — PROGRESS NOTE ADULT - ASSESSMENT
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 Developed COVID 19 while in the hospital, but subsequent test was positive, patient was transferred to [facility] tele COVID unit without need for supplemental oxygen. Now on RA, on wet to dry dressing, walked with PT, MEG improving, H/H low but stable s/p multiple units of PRBC, following up heme work up to determine if there is a source of bleed vs a hematologic cause for anemia; Pt stable for dispo to [facility] when set up with  when he is able to take care of his wounds. Pt currently needs daily wound/ostomy checks. Shelter [facility] only has 1 x a week wound check available

## 2020-05-05 NOTE — ADVANCED PRACTICE NURSE CONSULT - ASSESSMENT
Pt sitting up in chair getting blood at this time. Stated he didn't know why he keeps on living because it's not worth it. Asked pt if he wanted to talk to someone and he said no. Pt emptied appliance while sitting up in chair, reviewed again that the pouch didn't need to be changed every time it is emptied, the whole thing is changed twice a week. 1 piece appliances placed in room for ease of use with next appliance change.

## 2020-05-05 NOTE — PROGRESS NOTE ADULT - SUBJECTIVE AND OBJECTIVE BOX
INTERVAL HPI/OVERNIGHT EVENTS:    Patient is a 60y old  Male who presents with a chief complaint of Toxic Megacolon (04 May 2020 10:27)  Spoke to patient via phone and the primary team taking care of the patient.  No acute events  Pt reports decreased nausea and improved appetite though still only about 1/2 of his meals.  Saturating well on RA  TSh was 3.5 and free T4 was 1.01 on 5/3  FSg and insulin administration reviewed  FSG & Insulin received:  Yesterday:  pre-dinner fs, 4 nutritional lispro   units +2  units lispro SS  bedtime fs, 10 lantus   units + 1   units lispro SS  Today:  pre-breakfast fs, 6 nutritional lispro   units+ 2   units lispro SS  pre-lunch fs    Pt reports the following symptoms:  Negative, except as noted in HPI.      MEDICATIONS  (STANDING):  amLODIPine   Tablet 10 milliGRAM(s) Oral daily  atorvastatin 10 milliGRAM(s) Oral at bedtime  baclofen 2.5 milliGRAM(s) Oral every 12 hours  brimonidine 0.2% Ophthalmic Solution 1 Drop(s) Both EYES three times a day  chlorhexidine 2% Cloths 1 Application(s) Topical daily  dextrose 5%. 1000 milliLiter(s) (50 mL/Hr) IV Continuous <Continuous>  dextrose 50% Injectable 12.5 Gram(s) IV Push once  dextrose 50% Injectable 25 Gram(s) IV Push once  dextrose 50% Injectable 25 Gram(s) IV Push once  dorzolamide 2% Ophthalmic Solution 1 Drop(s) Both EYES three times a day  gabapentin 300 milliGRAM(s) Oral every 8 hours  heparin   Injectable 7500 Unit(s) SubCutaneous every 8 hours  insulin glargine Injectable (LANTUS) 12 Unit(s) SubCutaneous at bedtime  insulin lispro (HumaLOG) corrective regimen sliding scale   SubCutaneous at bedtime  insulin lispro (HumaLOG) corrective regimen sliding scale   SubCutaneous three times a day before meals  insulin lispro Injectable (HumaLOG) 6 Unit(s) SubCutaneous three times a day before meals  ondansetron Injectable 4 milliGRAM(s) IV Push once  pantoprazole    Tablet 40 milliGRAM(s) Oral every 12 hours  rifAXIMin 550 milliGRAM(s) Oral two times a day  sodium bicarbonate 650 milliGRAM(s) Oral three times a day  traZODone 100 milliGRAM(s) Oral at bedtime    MEDICATIONS  (PRN):  acetaminophen   Tablet .. 1000 milliGRAM(s) Oral every 12 hours PRN Mild Pain (1 - 3), Moderate Pain (4 - 6)  dextrose 40% Gel 15 Gram(s) Oral once PRN Blood Glucose LESS THAN 70 milliGRAM(s)/deciliter  glucagon  Injectable 1 milliGRAM(s) IntraMuscular once PRN Glucose LESS THAN 70 milligrams/deciliter      PHYSICAL EXAM  Vital Signs Last 24 Hrs  T(C): 36.7 (05 May 2020 16:10), Max: 37.1 (04 May 2020 21:30)  T(F): 98.1 (05 May 2020 16:10), Max: 98.8 (04 May 2020 21:30)  HR: 66 (05 May 2020 16:10) (66 - 93)  BP: 144/70 (05 May 2020 16:10) (127/61 - 172/72)  BP(mean): --  RR: 20 (05 May 2020 16:10) (18 - 20)  SpO2: 100% (05 May 2020 06:01) (100% - 100%)    Due to the nature of this patient’s COVID-19 isolation status (either confirmed or suspected), this note was prepared without a bedside physical examination to prevent spread of infection and to conserve personal protective equipment during this nationwide pandemic. If possible, direct patient communication occurred via electronic measures or telephone conversation. Examination highlights were provided by a bedside nurse wearing personal protective equipment and review of pertinent medical records. Objective data (vital signs, urine output, lab results, imaging studies, medications, etc) were reviewed in detail. Face to face visits and physical examination have been limited only to patients for whom it is required for medical decision making.    LABS:                        6.6    7.99  )-----------( 294      ( 05 May 2020 07:38 )             20.5     05-05    135  |  99  |  25<H>  ----------------------------<  151<H>  4.5   |  21<L>  |  2.22<H>    Ca    8.9      05 May 2020 07:38  Phos  4.1     05-05  Mg     1.9     05-05    TPro  7.2  /  Alb  3.2<L>  /  TBili  <0.2  /  DBili  x   /  AST  21  /  ALT  20  /  AlkPhos  105  -      Urinalysis Basic - ( 04 May 2020 17:54 )    Color: Yellow / Appearance: Clear / SG: <=1.005 / pH: x  Gluc: x / Ketone: NEGATIVE  / Bili: Negative / Urobili: 0.2 E.U./dL   Blood: x / Protein: NEGATIVE mg/dL / Nitrite: POSITIVE   Leuk Esterase: NEGATIVE / RBC: < 5 /HPF / WBC < 5 /HPF   Sq Epi: x / Non Sq Epi: 0-5 /HPF / Bacteria: Present /HPF      Thyroid Stimulating Hormone, Serum: 3.509 uIU/mL ( @ 06:07)  Thyroid Stimulating Hormone, Serum: 3.325 uIU/mL ( @ 06:17)  Thyroid Stimulating Hormone, Serum: 2.813 uIU/mL ( @ 07:28)  Thyroid Stimulating Hormone, Serum: 2.877 uIU/mL ( @ 00:34)      HbA1C: 6.9 % ( @ 06:59)  7.6 % ( @ 05:58)    CAPILLARY BLOOD GLUCOSE      POCT Blood Glucose.: 135 mg/dL (05 May 2020 17:14)  POCT Blood Glucose.: 107 mg/dL (05 May 2020 11:36)  POCT Blood Glucose.: 164 mg/dL (05 May 2020 07:59)  POCT Blood Glucose.: 190 mg/dL (05 May 2020 05:39)  POCT Blood Glucose.: 186 mg/dL (04 May 2020 21:32)      A/P 60yMale with hx of DM now with COVID, worsening renal function, moderate hyperglycemia.     1.  DM: type 2  Please increase Lantus to 12 units at bedtime  PLease continue Lispro to 6 units tid with meals.   Continue lispro moderate dose scale with meals and at bedtime.   Continue consistent carb diet  FSG Goal 100-180    2.  Hyperlipidemia - goal LDL < 70  - on lipitor 10 mg once daily    3.  Obesity - outpatient medical management.      4.  Hypothyroidism   - Please repeat TSH and free T4 on Thursday.  - TSH on admission 2.8.   - levothyroxine 50mcg discontinued on  to assess necessity as there is no evidence of autoimmune hypothyroidism at this time.   - Repeat TSH 3.32 and free T4 1.03 ().   - TSH 3.5 and Free T4 1.01 on 5/3     Case seen and discussed with  and updated the primary team.  Pt can follow up at discharge with [facility] by calling  to make an appointment.

## 2020-05-05 NOTE — PROGRESS NOTE ADULT - SUBJECTIVE AND OBJECTIVE BOX
renal fx plateauing   no uop/ ileostomy output documented  acceptable electrolytes   Hb dropped to 6.6 << 7.2 g/dl   not in distress, breathing comfortably on RA, sat 100%       Meds:  acetaminophen   Tablet .. 1000 every 12 hours PRN  amLODIPine   Tablet 10 daily  atorvastatin 10 at bedtime  baclofen 2.5 every 12 hours  brimonidine 0.2% Ophthalmic Solution 1 three times a day  chlorhexidine 2% Cloths 1 daily  dextrose 40% Gel 15 once PRN  dextrose 5%. 1000 <Continuous>  dextrose 50% Injectable 12.5 once  dextrose 50% Injectable 25 once  dextrose 50% Injectable 25 once  dorzolamide 2% Ophthalmic Solution 1 three times a day  gabapentin 300 every 8 hours  glucagon  Injectable 1 once PRN  heparin   Injectable 7500 every 8 hours  insulin glargine Injectable (LANTUS) 10 at bedtime  insulin lispro (HumaLOG) corrective regimen sliding scale  at bedtime  insulin lispro (HumaLOG) corrective regimen sliding scale  three times a day before meals  insulin lispro Injectable (HumaLOG) 6 three times a day before meals  levothyroxine 50 daily  ondansetron Injectable 4 once  pantoprazole    Tablet 40 every 12 hours  rifAXIMin 550 two times a day  sodium bicarbonate 650 three times a day  traZODone 100 at bedtime      T(C): , Max: 37.1 (05-04-20 @ 21:30)  T(F): , Max: 98.8 (05-04-20 @ 21:30)  HR: 93 (05-05-20 @ 06:01)  BP: 172/72 (05-05-20 @ 06:01)  BP(mean): --  RR: 18 (05-05-20 @ 06:01)  SpO2: 100% (05-05-20 @ 06:01)  Wt(kg): --    05-04 @ 07:01  -  05-05 @ 07:00  --------------------------------------------------------  IN: 0 mL / OUT: 125 mL / NET: -125 mL    05-05 @ 07:01  -  05-05 @ 14:56  --------------------------------------------------------  IN: 0 mL / OUT: 100 mL / NET: -100 mL          Review of Systems:  deferred due to covid isolation protocol      PHYSICAL EXAM:  deferred due to covid isolation protocol      LABS:                        6.6    7.99  )-----------( 294      ( 05 May 2020 07:38 )             20.5     05-05    135  |  99  |  25<H>  ----------------------------<  151<H>  4.5   |  21<L>  |  2.22<H>    Ca    8.9      05 May 2020 07:38  Phos  4.1     05-05  Mg     1.9     05-05    TPro  7.2  /  Alb  3.2<L>  /  TBili  <0.2  /  DBili  x   /  AST  21  /  ALT  20  /  AlkPhos  105  05-05        Urinalysis Basic - ( 04 May 2020 17:54 )    Color: Yellow / Appearance: Clear / SG: <=1.005 / pH: x  Gluc: x / Ketone: NEGATIVE  / Bili: Negative / Urobili: 0.2 E.U./dL   Blood: x / Protein: NEGATIVE mg/dL / Nitrite: POSITIVE   Leuk Esterase: NEGATIVE / RBC: < 5 /HPF / WBC < 5 /HPF   Sq Epi: x / Non Sq Epi: 0-5 /HPF / Bacteria: Present /HPF      Creatinine, Random Urine: 28 mg/dL (05-04 @ 17:54)  Sodium, Random Urine: 39 mmol/L (05-04 @ 17:54)        RADIOLOGY & ADDITIONAL STUDIES:    reviewed

## 2020-05-05 NOTE — PROGRESS NOTE ADULT - SUBJECTIVE AND OBJECTIVE BOX
STATUS POST:  Surgical pathology procedure  Transthoracic echocardiography (TTE)  US guided central cath  End ileostomy  Open subtotal colectomy  Transthoracic echocardiography (TTE)  CT chest wo con  CT abdomen pelvis  Limited B-scan ultrasound of retroperitoneal aorta    Patient seen and examined at bedside. In no acute distress. Denies N/V. Bowel fnx in ostomy, pain well controlled.     OBJECTIVE:    Vital Signs Last 24 Hrs  T(C): 36.9 (05 May 2020 06:01), Max: 37.1 (04 May 2020 21:30)  T(F): 98.4 (05 May 2020 06:01), Max: 98.8 (04 May 2020 21:30)  HR: 93 (05 May 2020 06:01) (77 - 93)  BP: 172/72 (05 May 2020 06:01) (127/61 - 172/72)  BP(mean): --  RR: 18 (05 May 2020 06:01) (18 - 18)  SpO2: 100% (05 May 2020 06:01) (100% - 100%)    I&O's Summary    04 May 2020 07:01  -  05 May 2020 07:00  --------------------------------------------------------  IN: 0 mL / OUT: 125 mL / NET: -125 mL          Physical Exam  General: NAD, resting comfortably in bed  C/V: NSR  Pulm: Nonlabored breathing, no respiratory distress  Abd: soft, non-tender, mildly distended, midline wound with good granulation. Ileostomy with liquid stool  Extrem: WWP, no edema,    LABS:                        6.6    7.99  )-----------( 294      ( 05 May 2020 07:38 )             20.5     05-05    135  |  99  |  25<H>  ----------------------------<  151<H>  4.5   |  21<L>  |  2.22<H>    Ca    8.9      05 May 2020 07:38  Phos  4.1     05-05  Mg     1.9     05-05    TPro  7.2  /  Alb  3.2<L>  /  TBili  <0.2  /  DBili  x   /  AST  21  /  ALT  20  /  AlkPhos  105  05-05      Urinalysis Basic - ( 04 May 2020 17:54 )    Color: Yellow / Appearance: Clear / SG: <=1.005 / pH: x  Gluc: x / Ketone: NEGATIVE  / Bili: Negative / Urobili: 0.2 E.U./dL   Blood: x / Protein: NEGATIVE mg/dL / Nitrite: POSITIVE   Leuk Esterase: NEGATIVE / RBC: < 5 /HPF / WBC < 5 /HPF   Sq Epi: x / Non Sq Epi: 0-5 /HPF / Bacteria: Present /HPF        RADIOLOGY & ADDITIONAL STUDIES:

## 2020-05-05 NOTE — PROGRESS NOTE ADULT - PROBLEM SELECTOR PLAN 3
Now s/p 3/31/20 ex lap, subtotal colectomy+ileostomy  course c/b fever and purulent wound infection (4/9), s/p vanc/zosyn.   -s/p laparotomy with wound vac in place (gen surg following)   -ileostomy in place draining brown stool with no signs of bleeding  -lomotil- low dose to prevent constipation/ hepatic encephalopthy  -general surgery following, appreciate recs  -woundcare as below      #s/p wound vac  -woundcare NP following and continue teaching patient   -wet to dry dressings daily, surgery following

## 2020-05-05 NOTE — PROGRESS NOTE ADULT - SUBJECTIVE AND OBJECTIVE BOX
INTERNAL MEDICINE PA PROGRESS NOTE     INTERVAL HPI/OVERNIGHT EVENTS: none    SUBJECTIVE: Patient seen and examined at bedside. Patient states nausea is improving, has not had this for the last two days. Abdominal pain and LE pain is manageable today. Dizziness with standing and occasional SOB. Denies any chest pain, cough, fever, chills, headaches, or myalgias.    OBJECTIVE:    VITAL SIGNS:  ICU Vital Signs Last 24 Hrs  T(C): 36.9 (05 May 2020 06:01), Max: 37.1 (04 May 2020 21:30)  T(F): 98.4 (05 May 2020 06:01), Max: 98.8 (04 May 2020 21:30)  HR: 93 (05 May 2020 06:01) (77 - 93)  BP: 172/72 (05 May 2020 06:01) (127/61 - 172/72)  RR: 18 (05 May 2020 06:01) (18 - 18)  SpO2: 100% (05 May 2020 06:01) (100% - 100%)        05-04 @ 07:01  -  05-05 @ 07:00  --------------------------------------------------------  IN: 0 mL / OUT: 125 mL / NET: -125 mL      CAPILLARY BLOOD GLUCOSE      POCT Blood Glucose.: 164 mg/dL (05 May 2020 07:59)      PHYSICAL EXAM:    General: NAD  HEENT: NC/AT; EMOI, clear conjunctiva  Neck: supple  Respiratory: CTA b/l  Cardiovascular: +S1/S2; RRR  Abdomen: soft, NT/ND but protuberant with ascites; +BS x4; Vertical abdominal scar packed with wet to dry dressings. Colostomy bag with thick dark brown stool.   Extremities: WWP, 2+ peripheral pulses b/l; no LE edema  Skin: normal color and turgor; no rash  Neurological: A&O x 3, no focal deficits    MEDICATIONS:  MEDICATIONS  (STANDING):  amLODIPine   Tablet 10 milliGRAM(s) Oral daily  atorvastatin 10 milliGRAM(s) Oral at bedtime  baclofen 2.5 milliGRAM(s) Oral every 12 hours  brimonidine 0.2% Ophthalmic Solution 1 Drop(s) Both EYES three times a day  chlorhexidine 2% Cloths 1 Application(s) Topical daily  dextrose 5%. 1000 milliLiter(s) (50 mL/Hr) IV Continuous <Continuous>  dextrose 50% Injectable 12.5 Gram(s) IV Push once  dextrose 50% Injectable 25 Gram(s) IV Push once  dextrose 50% Injectable 25 Gram(s) IV Push once  diphenoxylate/atropine 1 Tablet(s) Oral daily  dorzolamide 2% Ophthalmic Solution 1 Drop(s) Both EYES three times a day  gabapentin 300 milliGRAM(s) Oral every 8 hours  heparin   Injectable 7500 Unit(s) SubCutaneous every 8 hours  insulin glargine Injectable (LANTUS) 10 Unit(s) SubCutaneous at bedtime  insulin lispro (HumaLOG) corrective regimen sliding scale   SubCutaneous at bedtime  insulin lispro (HumaLOG) corrective regimen sliding scale   SubCutaneous three times a day before meals  insulin lispro Injectable (HumaLOG) 6 Unit(s) SubCutaneous three times a day before meals  levothyroxine 50 MICROGram(s) Oral daily  ondansetron Injectable 4 milliGRAM(s) IV Push once  pantoprazole    Tablet 40 milliGRAM(s) Oral every 12 hours  rifAXIMin 550 milliGRAM(s) Oral two times a day  sodium bicarbonate 650 milliGRAM(s) Oral three times a day  traZODone 100 milliGRAM(s) Oral at bedtime    MEDICATIONS  (PRN):  acetaminophen   Tablet .. 1000 milliGRAM(s) Oral every 12 hours PRN Mild Pain (1 - 3), Moderate Pain (4 - 6)  dextrose 40% Gel 15 Gram(s) Oral once PRN Blood Glucose LESS THAN 70 milliGRAM(s)/deciliter  glucagon  Injectable 1 milliGRAM(s) IntraMuscular once PRN Glucose LESS THAN 70 milligrams/deciliter      ALLERGIES:  Allergies    No Known Allergies    Intolerances        LABS:                        6.6    7.99  )-----------( 294      ( 05 May 2020 07:38 )             20.5     05-05    135  |  99  |  25<H>  ----------------------------<  151<H>  4.5   |  21<L>  |  2.22<H>    Ca    8.9      05 May 2020 07:38  Phos  4.1     05-05  Mg     1.9     05-05    TPro  7.2  /  Alb  3.2<L>  /  TBili  <0.2  /  DBili  x   /  AST  21  /  ALT  20  /  AlkPhos  105  05-05      Urinalysis Basic - ( 04 May 2020 17:54 )    Color: Yellow / Appearance: Clear / SG: <=1.005 / pH: x  Gluc: x / Ketone: NEGATIVE  / Bili: Negative / Urobili: 0.2 E.U./dL   Blood: x / Protein: NEGATIVE mg/dL / Nitrite: POSITIVE   Leuk Esterase: NEGATIVE / RBC: < 5 /HPF / WBC < 5 /HPF   Sq Epi: x / Non Sq Epi: 0-5 /HPF / Bacteria: Present /HPF      D-dimer: D-Dimer Assay, Quantitative: 1880 ng/mL DDU (05-05-20 @ 07:38)  D-Dimer Assay, Quantitative: 2202 ng/mL DDU (05-04-20 @ 07:10)    CRP: C-Reactive Protein, Serum: 7.40 mg/dL (05-05-20 @ 07:38)  C-Reactive Protein, Serum: 8.32 mg/dL (05-04-20 @ 07:10)    Ferritin: Ferritin, Serum: 729 ng/mL (05-05-20 @ 07:38)  Ferritin, Serum: 825 ng/mL (05-04-20 @ 07:10)    COVID Labs  G6PD: Glucose-6-Phosphate Dehydrogenase: 20.6 U/g Hgb (04-14-20 @ 11:02)    Immunoglobulins panel:   Quantiferon Gold Tb:   Triglyceride level:               RADIOLOGY & ADDITIONAL TESTS: Reviewed.

## 2020-05-05 NOTE — PROGRESS NOTE ADULT - PROBLEM SELECTOR PLAN 9
-Increased trazadone to back to full home dose at 100 mg PO daily     #suicidal ideation   Pt endorsing SI without a plan.   -psychiatry consult cleared for discharge; no additional psychiatric medications, continue trazodone; no utility in inpatient psych, likely adjustment disorder with depressive mood related to social issues  -social work re: going back to shelter

## 2020-05-05 NOTE — PROGRESS NOTE ADULT - ATTENDING COMMENTS
24Hr Events: Med rec performed, doses adjusted for renal fnxn  Pt states pain persists otherwise ROS neg  Afeb, 100% on RA, exam unchanged  Hb 6.6  60M PMH HTN, DM + neuropathy, IVDU, ETOH liver cirrhosis, presents to [facility] s/p fall at home; found with toxic megacolon and pneumatosis; now s/p ex lap + subtotal colectomy, ileostomy (3/31). Developed COVID 19 while in the hospital, but subsequent test was positive, patient was transferred to [facility] tele COVID unit without need for supplemental oxygen. Now on RA, on wet to dry dressing, walked with PT, MEG improving, H/H low but stable s/p multiple units of PRBC, following up heme work up to determine if there is a source of bleed vs a hematologic cause for anemia; Pt stable for dispo to custodial when set up with SW when he is able to take care of his wounds. Pt currently needs daily wound/ostomy checks. Shel[facility] only has 1 x a week wound check available   Xfuse 1 unit PRBCs, w/u neg for GI source  Pain meds adjusted for renal fnxn  F/u surgery, ostomy RN re: ostomy and wound care  Consult podiatry re: R foot lesion  FS acceptable  Dispo per SW and shelter

## 2020-05-05 NOTE — PROVIDER CONTACT NOTE (CRITICAL VALUE NOTIFICATION) - SITUATION
Pt with low hemoglobin.
Provider aware of Hbg & Hct. to order 1 unit PRBC's
critical hemoglobin value 6.9
md notified concerning hgb 6.6 and hct 20.5

## 2020-05-05 NOTE — PROGRESS NOTE ADULT - ASSESSMENT
59 y/o M with PMH of HTN, DM complicated by neuropathy, IVDU, ETOH liver cirrhosis, who presents to [facility] for fall at home; found to have toxic megacolon and pneumatosis; underwent ex lap and subtotal colectomy, and ileostomy on 3/31. COVID +       # Non-oliguric MEG, likely perfusional ATN  - BUN/Cr plateauing, 25/2.2 << 25/2.4   - Zbigniew 39, noted, FeNa cw intrinsic cause   - acceptable electrolytes  - keep euvolemic   - monitor BUN/Cr, lytes, uop   - on sodium bicarbonate 650 mg po q8hr     # Hypertension  - on amlodipine 10 mg po qd  - low salt diet     # Anemia  - Hb 6.6 << 7.2 g/dl   - 1u pRBC   - r/o hemolysis, check peripheral smear, reticulocytes, haptoglobin, LDH, bili   - r/o bleeding

## 2020-05-05 NOTE — PROGRESS NOTE ADULT - PROBLEM SELECTOR PLAN 6
Per patient, home regimen: gabapentin 1200mg TID (confirmed with pharmacy), which is well over daily limit for gabapentin use  -GFR improved 5/5, can increase does of gabapentin from 200 mg TID to 300 mg TID

## 2020-05-05 NOTE — PROGRESS NOTE ADULT - ATTENDING COMMENTS
Glucose alem noticeably overnight, so will increase the Lantus to 12 units.  Will leave the pre-meal unchanged for now.  PO intake is improving, however, so may well need to increase the lispro by tomorrow.  Would like to observe the pt and follow TFTs off levothyroxine, since TFTs early in admission were normal with his having lapsed on the medication as an outpatient

## 2020-05-05 NOTE — ADVANCED PRACTICE NURSE CONSULT - RECOMMEDATIONS
Will continue to follow. Spoke with ARIES Rice and house staff re: pt's desire to not continue to live because of all his health problems.

## 2020-05-05 NOTE — PROGRESS NOTE ADULT - PROBLEM SELECTOR PLAN 5
Normocytic likely 2/2 chronic disease + kidney dysfunction vs less likely acute blood loss  -Hgb 7.2> 6.6 5/5, will transfuse one unit PRBC  -monitor hb, transfuse <7  -s/p 4U pRBCs total this admission as of 4/24  -pantoprazole 40mg BID   -monitor for signs of bleeding   -GI no longer following, states no longer any need for capsule endoscopy

## 2020-05-06 LAB
ALBUMIN SERPL ELPH-MCNC: 3.3 G/DL — SIGNIFICANT CHANGE UP (ref 3.3–5)
ALP SERPL-CCNC: 111 U/L — SIGNIFICANT CHANGE UP (ref 40–120)
ALT FLD-CCNC: 20 U/L — SIGNIFICANT CHANGE UP (ref 10–45)
ANION GAP SERPL CALC-SCNC: 14 MMOL/L — SIGNIFICANT CHANGE UP (ref 5–17)
ANISOCYTOSIS BLD QL: SLIGHT — SIGNIFICANT CHANGE UP
AST SERPL-CCNC: 18 U/L — SIGNIFICANT CHANGE UP (ref 10–40)
BASOPHILS # BLD AUTO: 0.03 K/UL — SIGNIFICANT CHANGE UP (ref 0–0.2)
BASOPHILS NFR BLD AUTO: 0.3 % — SIGNIFICANT CHANGE UP (ref 0–2)
BASOPHILS NFR BLD AUTO: 1 % — SIGNIFICANT CHANGE UP (ref 0–2)
BILIRUB SERPL-MCNC: 0.2 MG/DL — SIGNIFICANT CHANGE UP (ref 0.2–1.2)
BUN SERPL-MCNC: 29 MG/DL — HIGH (ref 7–23)
CALCIUM SERPL-MCNC: 8.8 MG/DL — SIGNIFICANT CHANGE UP (ref 8.4–10.5)
CHLORIDE SERPL-SCNC: 98 MMOL/L — SIGNIFICANT CHANGE UP (ref 96–108)
CO2 SERPL-SCNC: 21 MMOL/L — LOW (ref 22–31)
CREAT SERPL-MCNC: 2.08 MG/DL — HIGH (ref 0.5–1.3)
CRP SERPL-MCNC: 6.78 MG/DL — HIGH (ref 0–0.4)
DACRYOCYTES BLD QL SMEAR: SLIGHT — SIGNIFICANT CHANGE UP
EOSINOPHIL # BLD AUTO: 0.25 K/UL — SIGNIFICANT CHANGE UP (ref 0–0.5)
EOSINOPHIL NFR BLD AUTO: 2.8 % — SIGNIFICANT CHANGE UP (ref 0–6)
EOSINOPHIL NFR BLD AUTO: 4 % — SIGNIFICANT CHANGE UP (ref 0–6)
GLUCOSE BLDC GLUCOMTR-MCNC: 136 MG/DL — HIGH (ref 70–99)
GLUCOSE BLDC GLUCOMTR-MCNC: 168 MG/DL — HIGH (ref 70–99)
GLUCOSE BLDC GLUCOMTR-MCNC: 170 MG/DL — HIGH (ref 70–99)
GLUCOSE BLDC GLUCOMTR-MCNC: 214 MG/DL — HIGH (ref 70–99)
GLUCOSE SERPL-MCNC: 221 MG/DL — HIGH (ref 70–99)
HCT VFR BLD CALC: 25.7 % — LOW (ref 39–50)
HGB BLD-MCNC: 8.3 G/DL — LOW (ref 13–17)
IMM GRANULOCYTES NFR BLD AUTO: 1.3 % — SIGNIFICANT CHANGE UP (ref 0–1.5)
LG PLATELETS BLD QL AUTO: PRESENT — SIGNIFICANT CHANGE UP
LYMPHOCYTES # BLD AUTO: 0.94 K/UL — LOW (ref 1–3.3)
LYMPHOCYTES # BLD AUTO: 10.5 % — LOW (ref 13–44)
LYMPHOCYTES # BLD AUTO: 11 % — LOW (ref 13–44)
MAGNESIUM SERPL-MCNC: 1.5 MG/DL — LOW (ref 1.6–2.6)
MANUAL DIF COMMENT BLD-IMP: SIGNIFICANT CHANGE UP
MANUAL SMEAR VERIFICATION: SIGNIFICANT CHANGE UP
MCHC RBC-ENTMCNC: 28.9 PG — SIGNIFICANT CHANGE UP (ref 27–34)
MCHC RBC-ENTMCNC: 32.3 GM/DL — SIGNIFICANT CHANGE UP (ref 32–36)
MCV RBC AUTO: 89.5 FL — SIGNIFICANT CHANGE UP (ref 80–100)
METAMYELOCYTES # FLD: 1 % — HIGH
MICROCYTES BLD QL: SLIGHT — SIGNIFICANT CHANGE UP
MONOCYTES # BLD AUTO: 0.85 K/UL — SIGNIFICANT CHANGE UP (ref 0–0.9)
MONOCYTES NFR BLD AUTO: 4 % — SIGNIFICANT CHANGE UP (ref 2–14)
MONOCYTES NFR BLD AUTO: 9.5 % — SIGNIFICANT CHANGE UP (ref 2–14)
MYELOCYTES NFR BLD: 1 % — HIGH
NEUTROPHILS # BLD AUTO: 6.73 K/UL — SIGNIFICANT CHANGE UP (ref 1.8–7.4)
NEUTROPHILS NFR BLD AUTO: 75.6 % — SIGNIFICANT CHANGE UP (ref 43–77)
NEUTROPHILS NFR BLD AUTO: 78 % — HIGH (ref 43–77)
NRBC # BLD: 0 /100 WBCS — SIGNIFICANT CHANGE UP (ref 0–0)
OVALOCYTES BLD QL SMEAR: SLIGHT — SIGNIFICANT CHANGE UP
PHOSPHATE SERPL-MCNC: 4.4 MG/DL — SIGNIFICANT CHANGE UP (ref 2.5–4.5)
PLAT MORPH BLD: ABNORMAL
PLATELET # BLD AUTO: 307 K/UL — SIGNIFICANT CHANGE UP (ref 150–400)
POLYCHROMASIA BLD QL SMEAR: SLIGHT — SIGNIFICANT CHANGE UP
POTASSIUM SERPL-MCNC: 4.8 MMOL/L — SIGNIFICANT CHANGE UP (ref 3.5–5.3)
POTASSIUM SERPL-SCNC: 4.8 MMOL/L — SIGNIFICANT CHANGE UP (ref 3.5–5.3)
PROT SERPL-MCNC: 7.4 G/DL — SIGNIFICANT CHANGE UP (ref 6–8.3)
RBC # BLD: 2.87 M/UL — LOW (ref 4.2–5.8)
RBC # FLD: 14.6 % — HIGH (ref 10.3–14.5)
RBC BLD AUTO: ABNORMAL
RETICS #: 73.2 K/UL — SIGNIFICANT CHANGE UP (ref 25–125)
RETICS/RBC NFR: 2.6 % — HIGH (ref 0.5–2.5)
SMUDGE CELLS # BLD: SIGNIFICANT CHANGE UP
SODIUM SERPL-SCNC: 133 MMOL/L — LOW (ref 135–145)
SPHEROCYTES BLD QL SMEAR: SLIGHT — SIGNIFICANT CHANGE UP
WBC # BLD: 8.92 K/UL — SIGNIFICANT CHANGE UP (ref 3.8–10.5)
WBC # FLD AUTO: 8.92 K/UL — SIGNIFICANT CHANGE UP (ref 3.8–10.5)

## 2020-05-06 PROCEDURE — 99232 SBSQ HOSP IP/OBS MODERATE 35: CPT

## 2020-05-06 PROCEDURE — 99231 SBSQ HOSP IP/OBS SF/LOW 25: CPT | Mod: GC

## 2020-05-06 RX ORDER — MAGNESIUM SULFATE 500 MG/ML
2 VIAL (ML) INJECTION ONCE
Refills: 0 | Status: COMPLETED | OUTPATIENT
Start: 2020-05-06 | End: 2020-05-06

## 2020-05-06 RX ORDER — SODIUM BICARBONATE 1 MEQ/ML
1 SYRINGE (ML) INTRAVENOUS
Qty: 0 | Refills: 0 | DISCHARGE
Start: 2020-05-06

## 2020-05-06 RX ORDER — INSULIN GLARGINE 100 [IU]/ML
14 INJECTION, SOLUTION SUBCUTANEOUS AT BEDTIME
Refills: 0 | Status: DISCONTINUED | OUTPATIENT
Start: 2020-05-06 | End: 2020-05-21

## 2020-05-06 RX ORDER — PANTOPRAZOLE SODIUM 20 MG/1
1 TABLET, DELAYED RELEASE ORAL
Qty: 0 | Refills: 0 | DISCHARGE
Start: 2020-05-06

## 2020-05-06 RX ORDER — ASPIRIN/CALCIUM CARB/MAGNESIUM 324 MG
1 TABLET ORAL
Qty: 0 | Refills: 0 | DISCHARGE

## 2020-05-06 RX ORDER — GABAPENTIN 400 MG/1
1 CAPSULE ORAL
Qty: 0 | Refills: 0 | DISCHARGE
Start: 2020-05-06

## 2020-05-06 RX ORDER — LACTULOSE 10 G/15ML
1 SOLUTION ORAL
Qty: 0 | Refills: 0 | DISCHARGE

## 2020-05-06 RX ORDER — ONDANSETRON 8 MG/1
4 TABLET, FILM COATED ORAL EVERY 8 HOURS
Refills: 0 | Status: DISCONTINUED | OUTPATIENT
Start: 2020-05-06 | End: 2020-05-25

## 2020-05-06 RX ORDER — DIPHENOXYLATE HCL/ATROPINE 2.5-.025MG
1 TABLET ORAL
Qty: 0 | Refills: 0 | DISCHARGE
Start: 2020-05-06

## 2020-05-06 RX ORDER — BRIMONIDINE TARTRATE 2 MG/MG
1 SOLUTION/ DROPS OPHTHALMIC
Qty: 0 | Refills: 0 | DISCHARGE
Start: 2020-05-06

## 2020-05-06 RX ORDER — ACETAMINOPHEN 500 MG
1000 TABLET ORAL EVERY 12 HOURS
Refills: 0 | Status: DISCONTINUED | OUTPATIENT
Start: 2020-05-06 | End: 2020-05-08

## 2020-05-06 RX ORDER — ACETAMINOPHEN 500 MG
2 TABLET ORAL
Qty: 0 | Refills: 0 | DISCHARGE
Start: 2020-05-06

## 2020-05-06 RX ORDER — DORZOLAMIDE HYDROCHLORIDE 20 MG/ML
1 SOLUTION/ DROPS OPHTHALMIC
Qty: 0 | Refills: 0 | DISCHARGE
Start: 2020-05-06

## 2020-05-06 RX ADMIN — Medication 2.5 MILLIGRAM(S): at 17:01

## 2020-05-06 RX ADMIN — Medication 2: at 00:01

## 2020-05-06 RX ADMIN — Medication 100 MILLIGRAM(S): at 22:06

## 2020-05-06 RX ADMIN — GABAPENTIN 300 MILLIGRAM(S): 400 CAPSULE ORAL at 22:06

## 2020-05-06 RX ADMIN — DORZOLAMIDE HYDROCHLORIDE 1 DROP(S): 20 SOLUTION/ DROPS OPHTHALMIC at 06:37

## 2020-05-06 RX ADMIN — Medication 50 GRAM(S): at 12:23

## 2020-05-06 RX ADMIN — PANTOPRAZOLE SODIUM 40 MILLIGRAM(S): 20 TABLET, DELAYED RELEASE ORAL at 06:36

## 2020-05-06 RX ADMIN — GABAPENTIN 300 MILLIGRAM(S): 400 CAPSULE ORAL at 13:50

## 2020-05-06 RX ADMIN — Medication 6 UNIT(S): at 07:46

## 2020-05-06 RX ADMIN — DORZOLAMIDE HYDROCHLORIDE 1 DROP(S): 20 SOLUTION/ DROPS OPHTHALMIC at 13:51

## 2020-05-06 RX ADMIN — Medication 2.5 MILLIGRAM(S): at 06:36

## 2020-05-06 RX ADMIN — HEPARIN SODIUM 7500 UNIT(S): 5000 INJECTION INTRAVENOUS; SUBCUTANEOUS at 13:50

## 2020-05-06 RX ADMIN — Medication 1000 MILLIGRAM(S): at 18:47

## 2020-05-06 RX ADMIN — Medication 100 MILLIGRAM(S): at 01:59

## 2020-05-06 RX ADMIN — Medication 2: at 07:46

## 2020-05-06 RX ADMIN — AMLODIPINE BESYLATE 10 MILLIGRAM(S): 2.5 TABLET ORAL at 06:36

## 2020-05-06 RX ADMIN — BRIMONIDINE TARTRATE 1 DROP(S): 2 SOLUTION/ DROPS OPHTHALMIC at 13:51

## 2020-05-06 RX ADMIN — Medication 650 MILLIGRAM(S): at 06:36

## 2020-05-06 RX ADMIN — Medication 650 MILLIGRAM(S): at 22:06

## 2020-05-06 RX ADMIN — Medication 6 UNIT(S): at 17:02

## 2020-05-06 RX ADMIN — Medication 4: at 17:01

## 2020-05-06 RX ADMIN — HEPARIN SODIUM 7500 UNIT(S): 5000 INJECTION INTRAVENOUS; SUBCUTANEOUS at 06:35

## 2020-05-06 RX ADMIN — Medication 650 MILLIGRAM(S): at 01:59

## 2020-05-06 RX ADMIN — DORZOLAMIDE HYDROCHLORIDE 1 DROP(S): 20 SOLUTION/ DROPS OPHTHALMIC at 22:08

## 2020-05-06 RX ADMIN — GABAPENTIN 300 MILLIGRAM(S): 400 CAPSULE ORAL at 06:36

## 2020-05-06 RX ADMIN — INSULIN GLARGINE 14 UNIT(S): 100 INJECTION, SOLUTION SUBCUTANEOUS at 22:05

## 2020-05-06 RX ADMIN — CHLORHEXIDINE GLUCONATE 1 APPLICATION(S): 213 SOLUTION TOPICAL at 12:23

## 2020-05-06 RX ADMIN — Medication 6 UNIT(S): at 12:22

## 2020-05-06 RX ADMIN — BRIMONIDINE TARTRATE 1 DROP(S): 2 SOLUTION/ DROPS OPHTHALMIC at 06:37

## 2020-05-06 RX ADMIN — HEPARIN SODIUM 7500 UNIT(S): 5000 INJECTION INTRAVENOUS; SUBCUTANEOUS at 22:06

## 2020-05-06 RX ADMIN — Medication 1000 MILLIGRAM(S): at 09:15

## 2020-05-06 RX ADMIN — BRIMONIDINE TARTRATE 1 DROP(S): 2 SOLUTION/ DROPS OPHTHALMIC at 22:08

## 2020-05-06 RX ADMIN — PANTOPRAZOLE SODIUM 40 MILLIGRAM(S): 20 TABLET, DELAYED RELEASE ORAL at 17:01

## 2020-05-06 RX ADMIN — Medication 1: at 22:07

## 2020-05-06 RX ADMIN — Medication 1 TABLET(S): at 12:27

## 2020-05-06 RX ADMIN — ATORVASTATIN CALCIUM 10 MILLIGRAM(S): 80 TABLET, FILM COATED ORAL at 22:06

## 2020-05-06 RX ADMIN — Medication 650 MILLIGRAM(S): at 13:51

## 2020-05-06 NOTE — PROGRESS NOTE ADULT - ASSESSMENT
60M PMH HTN, DM + neuropathy, IVDU, ETOH liver cirrhosis, [facility] at home; found with toxic megacolon and pneumatosis; now s/p ex lap + subtotal colectomy, ileostomy (3/31). Developed COVID 19 while in the hospital, but subsequent test was positive, patient was transferred to [facility] tele COVID unit without need for supplemental oxygen. Now on RA, on wet to dry dressing, walked with PT, MEG improving, H/H low but stable s/p multiple units of PRBC, following up heme work up to determine if there is a source of bleed vs a hematologic cause for anemia; Pt stable for dispo to nursing home when set up with  when he is able to take care of his wounds. Pt currently needs daily wound/ostomy checks. Shelter [facility] only has 1 x a week wound check available

## 2020-05-06 NOTE — PROGRESS NOTE ADULT - SUBJECTIVE AND OBJECTIVE BOX
INTERVAL HPI/OVERNIGHT EVENTS:    Patient is a 60y old  Male who presents with a chief complaint of Toxic Megacolon (04 May 2020 10:27)  Spoke to patient via phone and the primary team taking care of the patient.  No acute events  Pt reports decreased nausea and improved appetite though still only about 1/2 of his meals.  Saturating well on RA  TSh was 3.5 and free T4 was 1.01 on 5/3  FSg and insulin administration reviewed    FSG & Insulin received:  Yesterday:  pre-dinner fs, 6 nutritional lispro   units. Ate chicken, veg, tomato soup, and jello  bedtime fs, 12 lantus   units + 2   units lispro SS. Had cheese sandwich at before FSG.  Today:  pre-breakfast fs, 6 nutritional lispro   units+ 2   units lispro SS. Ate egg and pears.  pre-lunch fsg:     Pt reports the following symptoms:  Negative, except as noted in HPI.  MEDICATIONS  (STANDING):  amLODIPine   Tablet 10 milliGRAM(s) Oral daily  atorvastatin 10 milliGRAM(s) Oral at bedtime  baclofen 2.5 milliGRAM(s) Oral every 12 hours  brimonidine 0.2% Ophthalmic Solution 1 Drop(s) Both EYES three times a day  chlorhexidine 2% Cloths 1 Application(s) Topical daily  dextrose 5%. 1000 milliLiter(s) (50 mL/Hr) IV Continuous <Continuous>  dextrose 50% Injectable 12.5 Gram(s) IV Push once  dextrose 50% Injectable 25 Gram(s) IV Push once  dextrose 50% Injectable 25 Gram(s) IV Push once  diphenoxylate/atropine 1 Tablet(s) Oral daily  dorzolamide 2% Ophthalmic Solution 1 Drop(s) Both EYES three times a day  gabapentin 300 milliGRAM(s) Oral every 8 hours  heparin   Injectable 7500 Unit(s) SubCutaneous every 8 hours  insulin glargine Injectable (LANTUS) 12 Unit(s) SubCutaneous at bedtime  insulin lispro (HumaLOG) corrective regimen sliding scale   SubCutaneous at bedtime  insulin lispro (HumaLOG) corrective regimen sliding scale   SubCutaneous three times a day before meals  insulin lispro Injectable (HumaLOG) 6 Unit(s) SubCutaneous three times a day before meals  magnesium sulfate  IVPB 2 Gram(s) IV Intermittent once  pantoprazole    Tablet 40 milliGRAM(s) Oral every 12 hours  rifAXIMin 550 milliGRAM(s) Oral two times a day  sodium bicarbonate 650 milliGRAM(s) Oral three times a day  traZODone 100 milliGRAM(s) Oral at bedtime    MEDICATIONS  (PRN):  acetaminophen   Tablet .. 1000 milliGRAM(s) Oral every 12 hours PRN Mild Pain (1 - 3), Moderate Pain (4 - 6)  dextrose 40% Gel 15 Gram(s) Oral once PRN Blood Glucose LESS THAN 70 milliGRAM(s)/deciliter  glucagon  Injectable 1 milliGRAM(s) IntraMuscular once PRN Glucose LESS THAN 70 milligrams/deciliter  ondansetron    Tablet 4 milliGRAM(s) Oral every 8 hours PRN Nausea and/or Vomiting      PHYSICAL EXAM  Vital Signs Last 24 Hrs  T(C): 37.2 (06 May 2020 05:40), Max: 37.2 (06 May 2020 05:40)  T(F): 98.9 (06 May 2020 05:40), Max: 98.9 (06 May 2020 05:40)  HR: 74 (06 May 2020 05:40) (66 - 79)  BP: 168/77 (06 May 2020 05:40) (144/70 - 168/77)  BP(mean): --  RR: 17 (06 May 2020 09:15) (17 - 20)  SpO2: 97% (06 May 2020 09:15) (96% - 97%)    Due to the nature of this patient’s COVID-19 isolation status (either confirmed or suspected), this note was prepared without a bedside physical examination to prevent spread of infection and to conserve personal protective equipment during this nationwide pandemic. If possible, direct patient communication occurred via electronic measures or telephone conversation. Examination highlights were provided by a bedside nurse wearing personal protective equipment and review of pertinent medical records. Objective data (vital signs, urine output, lab results, imaging studies, medications, etc) were reviewed in detail. Face to face visits and physical examination have been limited only to patients for whom it is required for medical decision making.    LABS:                        8.3    8.92  )-----------( 307      ( 06 May 2020 08:51 )             25.7     05-06    133<L>  |  98  |  29<H>  ----------------------------<  221<H>  4.8   |  21<L>  |  2.08<H>    Ca    8.8      06 May 2020 08:51  Phos  4.4     -  Mg     1.5     -    TPro  7.4  /  Alb  3.3  /  TBili  0.2  /  DBili  x   /  AST  18  /  ALT  20  /  AlkPhos  111  -      Urinalysis Basic - ( 04 May 2020 17:54 )    Color: Yellow / Appearance: Clear / SG: <=1.005 / pH: x  Gluc: x / Ketone: NEGATIVE  / Bili: Negative / Urobili: 0.2 E.U./dL   Blood: x / Protein: NEGATIVE mg/dL / Nitrite: POSITIVE   Leuk Esterase: NEGATIVE / RBC: < 5 /HPF / WBC < 5 /HPF   Sq Epi: x / Non Sq Epi: 0-5 /HPF / Bacteria: Present /HPF      Thyroid Stimulating Hormone, Serum: 3.509 uIU/mL ( @ 06:07)  Thyroid Stimulating Hormone, Serum: 3.325 uIU/mL ( @ 06:17)  Thyroid Stimulating Hormone, Serum: 2.813 uIU/mL ( @ 07:28)  Thyroid Stimulating Hormone, Serum: 2.877 uIU/mL ( @ 00:34)      HbA1C: 6.9 % ( @ 06:59)  7.6 % ( @ 05:58)    CAPILLARY BLOOD GLUCOSE      POCT Blood Glucose.: 170 mg/dL (06 May 2020 06:49)  POCT Blood Glucose.: 221 mg/dL (05 May 2020 21:52)  POCT Blood Glucose.: 135 mg/dL (05 May 2020 17:14)    Will discuss in rounds  A/P 60yMale with hx of DM now with COVID, worsening renal function, moderate hyperglycemia.     1.  DM: type 2  Please increase Lantus to  units at bedtime  PLease continue Lispro to  units tid with meals.   Continue lispro moderate dose scale with meals and at bedtime.   Continue consistent carb diet  FSG Goal 100-180    2.  Hyperlipidemia - goal LDL < 70  - on lipitor 10 mg once daily    3.  Obesity - outpatient medical management.      4.  Hypothyroidism   - Please repeat TSH and free T4 on .  - TSH on admission 2.8.   - levothyroxine 50mcg discontinued on  to assess necessity as there is no evidence of autoimmune hypothyroidism at this time.   - Repeat TSH 3.32 and free T4 1.03 ().   - TSH 3.5 and Free T4 1.01 on 5/3     Case seen and discussed with  and updated the primary team.  Pt can follow up at discharge with [facility] by calling  to make an appointment. INTERVAL HPI/OVERNIGHT EVENTS:    Patient is a 60y old  Male who presents with a chief complaint of Toxic Megacolon (04 May 2020 10:27)  Spoke to patient via phone and the primary team taking care of the patient.  No acute events  Pt reports decreased nausea and improved appetite though still only about 1/2 of his meals. Reports pain at surgical site.  Saturating well on RA  TSh was 3.5 and free T4 was 1.01 on 5/3  FSg and insulin administration reviewed    FSG & Insulin received:  Yesterday:  pre-dinner fs, 6 nutritional lispro   units. Ate chicken, veg, tomato soup, and jello  bedtime fs, 12 lantus   units + 2   units lispro SS. Had cheese sandwich at before FSG.  Today:  pre-breakfast fs, 6 nutritional lispro   units+ 2   units lispro SS. Ate egg and pears.  pre-lunch fs, 6 nutritional    Pt reports the following symptoms:  Negative, except as noted in HPI.  MEDICATIONS  (STANDING):  amLODIPine   Tablet 10 milliGRAM(s) Oral daily  atorvastatin 10 milliGRAM(s) Oral at bedtime  baclofen 2.5 milliGRAM(s) Oral every 12 hours  brimonidine 0.2% Ophthalmic Solution 1 Drop(s) Both EYES three times a day  chlorhexidine 2% Cloths 1 Application(s) Topical daily  dextrose 5%. 1000 milliLiter(s) (50 mL/Hr) IV Continuous <Continuous>  dextrose 50% Injectable 12.5 Gram(s) IV Push once  dextrose 50% Injectable 25 Gram(s) IV Push once  dextrose 50% Injectable 25 Gram(s) IV Push once  diphenoxylate/atropine 1 Tablet(s) Oral daily  dorzolamide 2% Ophthalmic Solution 1 Drop(s) Both EYES three times a day  gabapentin 300 milliGRAM(s) Oral every 8 hours  heparin   Injectable 7500 Unit(s) SubCutaneous every 8 hours  insulin glargine Injectable (LANTUS) 12 Unit(s) SubCutaneous at bedtime  insulin lispro (HumaLOG) corrective regimen sliding scale   SubCutaneous at bedtime  insulin lispro (HumaLOG) corrective regimen sliding scale   SubCutaneous three times a day before meals  insulin lispro Injectable (HumaLOG) 6 Unit(s) SubCutaneous three times a day before meals  magnesium sulfate  IVPB 2 Gram(s) IV Intermittent once  pantoprazole    Tablet 40 milliGRAM(s) Oral every 12 hours  rifAXIMin 550 milliGRAM(s) Oral two times a day  sodium bicarbonate 650 milliGRAM(s) Oral three times a day  traZODone 100 milliGRAM(s) Oral at bedtime    MEDICATIONS  (PRN):  acetaminophen   Tablet .. 1000 milliGRAM(s) Oral every 12 hours PRN Mild Pain (1 - 3), Moderate Pain (4 - 6)  dextrose 40% Gel 15 Gram(s) Oral once PRN Blood Glucose LESS THAN 70 milliGRAM(s)/deciliter  glucagon  Injectable 1 milliGRAM(s) IntraMuscular once PRN Glucose LESS THAN 70 milligrams/deciliter  ondansetron    Tablet 4 milliGRAM(s) Oral every 8 hours PRN Nausea and/or Vomiting      PHYSICAL EXAM  Vital Signs Last 24 Hrs  T(C): 37.2 (06 May 2020 05:40), Max: 37.2 (06 May 2020 05:40)  T(F): 98.9 (06 May 2020 05:40), Max: 98.9 (06 May 2020 05:40)  HR: 74 (06 May 2020 05:40) (66 - 79)  BP: 168/77 (06 May 2020 05:40) (144/70 - 168/77)  BP(mean): --  RR: 17 (06 May 2020 09:15) (17 - 20)  SpO2: 97% (06 May 2020 09:15) (96% - 97%)    Due to the nature of this patient’s COVID-19 isolation status (either confirmed or suspected), this note was prepared without a bedside physical examination to prevent spread of infection and to conserve personal protective equipment during this nationwide pandemic. If possible, direct patient communication occurred via electronic measures or telephone conversation. Examination highlights were provided by a bedside nurse wearing personal protective equipment and review of pertinent medical records. Objective data (vital signs, urine output, lab results, imaging studies, medications, etc) were reviewed in detail. Face to face visits and physical examination have been limited only to patients for whom it is required for medical decision making.    LABS:                        8.3    8.92  )-----------( 307      ( 06 May 2020 08:51 )             25.7     05-06    133<L>  |  98  |  29<H>  ----------------------------<  221<H>  4.8   |  21<L>  |  2.08<H>    Ca    8.8      06 May 2020 08:51  Phos  4.4     -  Mg     1.5     -    TPro  7.4  /  Alb  3.3  /  TBili  0.2  /  DBili  x   /  AST  18  /  ALT  20  /  AlkPhos  111        Urinalysis Basic - ( 04 May 2020 17:54 )    Color: Yellow / Appearance: Clear / SG: <=1.005 / pH: x  Gluc: x / Ketone: NEGATIVE  / Bili: Negative / Urobili: 0.2 E.U./dL   Blood: x / Protein: NEGATIVE mg/dL / Nitrite: POSITIVE   Leuk Esterase: NEGATIVE / RBC: < 5 /HPF / WBC < 5 /HPF   Sq Epi: x / Non Sq Epi: 0-5 /HPF / Bacteria: Present /HPF      Thyroid Stimulating Hormone, Serum: 3.509 uIU/mL ( @ 06:07)  Thyroid Stimulating Hormone, Serum: 3.325 uIU/mL ( @ 06:17)  Thyroid Stimulating Hormone, Serum: 2.813 uIU/mL ( @ 07:28)  Thyroid Stimulating Hormone, Serum: 2.877 uIU/mL ( @ 00:34)      HbA1C: 6.9 % ( @ 06:59)  7.6 % ( @ 05:58)    CAPILLARY BLOOD GLUCOSE      POCT Blood Glucose.: 170 mg/dL (06 May 2020 06:49)  POCT Blood Glucose.: 221 mg/dL (05 May 2020 21:52)  POCT Blood Glucose.: 135 mg/dL (05 May 2020 17:14)    Will discuss in rounds  A/P 60yMale with hx of DM now with COVID, worsening renal function, moderate hyperglycemia.     1.  DM: type 2  Please increase Lantus to  units at bedtime  PLease continue Lispro to  units tid with meals.   Continue lispro moderate dose scale with meals and at bedtime.   Continue consistent carb diet  FSG Goal 100-180    2.  Hyperlipidemia - goal LDL < 70  - on lipitor 10 mg once daily    3.  Obesity - outpatient medical management.      4.  Hypothyroidism   - Please repeat TSH and free T4 on .  - TSH on admission 2.8.   - levothyroxine 50mcg discontinued on  to assess necessity as there is no evidence of autoimmune hypothyroidism at this time.   - Repeat TSH 3.32 and free T4 1.03 ().   - TSH 3.5 and Free T4 1.01 on 5/3     Case seen and discussed with  and updated the primary team.  Pt can follow up at discharge with [facility] by calling  to make an appointment. INTERVAL HPI/OVERNIGHT EVENTS:    Patient is a 60y old  Male who presents with a chief complaint of Toxic Megacolon (04 May 2020 10:27)  Spoke to patient via phone and the primary team taking care of the patient.  No acute events  Pt reports decreased nausea and improved appetite though still only about 1/2 of his meals. Reports pain at surgical site.  Saturating well on RA  TSh was 3.5 and free T4 was 1.01 on 5/3  FSg and insulin administration reviewed    FSG & Insulin received:  Yesterday:  pre-dinner fs, 6 nutritional lispro   units. Ate chicken, veg, tomato soup, and jello  bedtime fs, 12 lantus   units + 2   units lispro SS. Had cheese sandwich at before FSG.  Today:  pre-breakfast fs, 6 nutritional lispro   units+ 2   units lispro SS. Ate egg and pears.  pre-lunch fs, 6 nutritional    Pt reports the following symptoms:  Negative, except as noted in HPI.  MEDICATIONS  (STANDING):  amLODIPine   Tablet 10 milliGRAM(s) Oral daily  atorvastatin 10 milliGRAM(s) Oral at bedtime  baclofen 2.5 milliGRAM(s) Oral every 12 hours  brimonidine 0.2% Ophthalmic Solution 1 Drop(s) Both EYES three times a day  chlorhexidine 2% Cloths 1 Application(s) Topical daily  dextrose 5%. 1000 milliLiter(s) (50 mL/Hr) IV Continuous <Continuous>  dextrose 50% Injectable 12.5 Gram(s) IV Push once  dextrose 50% Injectable 25 Gram(s) IV Push once  dextrose 50% Injectable 25 Gram(s) IV Push once  diphenoxylate/atropine 1 Tablet(s) Oral daily  dorzolamide 2% Ophthalmic Solution 1 Drop(s) Both EYES three times a day  gabapentin 300 milliGRAM(s) Oral every 8 hours  heparin   Injectable 7500 Unit(s) SubCutaneous every 8 hours  insulin glargine Injectable (LANTUS) 12 Unit(s) SubCutaneous at bedtime  insulin lispro (HumaLOG) corrective regimen sliding scale   SubCutaneous at bedtime  insulin lispro (HumaLOG) corrective regimen sliding scale   SubCutaneous three times a day before meals  insulin lispro Injectable (HumaLOG) 6 Unit(s) SubCutaneous three times a day before meals  magnesium sulfate  IVPB 2 Gram(s) IV Intermittent once  pantoprazole    Tablet 40 milliGRAM(s) Oral every 12 hours  rifAXIMin 550 milliGRAM(s) Oral two times a day  sodium bicarbonate 650 milliGRAM(s) Oral three times a day  traZODone 100 milliGRAM(s) Oral at bedtime    MEDICATIONS  (PRN):  acetaminophen   Tablet .. 1000 milliGRAM(s) Oral every 12 hours PRN Mild Pain (1 - 3), Moderate Pain (4 - 6)  dextrose 40% Gel 15 Gram(s) Oral once PRN Blood Glucose LESS THAN 70 milliGRAM(s)/deciliter  glucagon  Injectable 1 milliGRAM(s) IntraMuscular once PRN Glucose LESS THAN 70 milligrams/deciliter  ondansetron    Tablet 4 milliGRAM(s) Oral every 8 hours PRN Nausea and/or Vomiting      PHYSICAL EXAM  Vital Signs Last 24 Hrs  T(C): 37.2 (06 May 2020 05:40), Max: 37.2 (06 May 2020 05:40)  T(F): 98.9 (06 May 2020 05:40), Max: 98.9 (06 May 2020 05:40)  HR: 74 (06 May 2020 05:40) (66 - 79)  BP: 168/77 (06 May 2020 05:40) (144/70 - 168/77)  BP(mean): --  RR: 17 (06 May 2020 09:15) (17 - 20)  SpO2: 97% (06 May 2020 09:15) (96% - 97%)    Due to the nature of this patient’s COVID-19 isolation status (either confirmed or suspected), this note was prepared without a bedside physical examination to prevent spread of infection and to conserve personal protective equipment during this nationwide pandemic. If possible, direct patient communication occurred via electronic measures or telephone conversation. Examination highlights were provided by a bedside nurse wearing personal protective equipment and review of pertinent medical records. Objective data (vital signs, urine output, lab results, imaging studies, medications, etc) were reviewed in detail. Face to face visits and physical examination have been limited only to patients for whom it is required for medical decision making.    LABS:                        8.3    8.92  )-----------( 307      ( 06 May 2020 08:51 )             25.7     05-06    133<L>  |  98  |  29<H>  ----------------------------<  221<H>  4.8   |  21<L>  |  2.08<H>    Ca    8.8      06 May 2020 08:51  Phos  4.4     -  Mg     1.5     -    TPro  7.4  /  Alb  3.3  /  TBili  0.2  /  DBili  x   /  AST  18  /  ALT  20  /  AlkPhos  111        Urinalysis Basic - ( 04 May 2020 17:54 )    Color: Yellow / Appearance: Clear / SG: <=1.005 / pH: x  Gluc: x / Ketone: NEGATIVE  / Bili: Negative / Urobili: 0.2 E.U./dL   Blood: x / Protein: NEGATIVE mg/dL / Nitrite: POSITIVE   Leuk Esterase: NEGATIVE / RBC: < 5 /HPF / WBC < 5 /HPF   Sq Epi: x / Non Sq Epi: 0-5 /HPF / Bacteria: Present /HPF      Thyroid Stimulating Hormone, Serum: 3.509 uIU/mL ( @ 06:07)  Thyroid Stimulating Hormone, Serum: 3.325 uIU/mL ( @ 06:17)  Thyroid Stimulating Hormone, Serum: 2.813 uIU/mL ( @ 07:28)  Thyroid Stimulating Hormone, Serum: 2.877 uIU/mL ( @ 00:34)      HbA1C: 6.9 % ( @ 06:59)  7.6 % ( @ 05:58)    CAPILLARY BLOOD GLUCOSE      POCT Blood Glucose.: 170 mg/dL (06 May 2020 06:49)  POCT Blood Glucose.: 221 mg/dL (05 May 2020 21:52)  POCT Blood Glucose.: 135 mg/dL (05 May 2020 17:14)    Will discuss in rounds  A/P 60yMale with hx of DM now with COVID, worsening renal function, moderate hyperglycemia.     1.  DM: type 2  Please increase Lantus to 14 units at bedtime  PLease continue Lispro 6  units tid with meals.   Continue lispro moderate dose scale with meals and at bedtime.   Continue consistent carb diet  FSG Goal 100-180    2.  Hyperlipidemia - goal LDL < 70  - on lipitor 10 mg once daily    3.  Obesity - outpatient medical management.      4.  Hypothyroidism   - Please repeat TSH and free T4 on .  - TSH on admission 2.8.   - levothyroxine 50mcg discontinued on  to assess necessity as there is no evidence of autoimmune hypothyroidism at this time.   - Repeat TSH 3.32 and free T4 1.03 ().   - TSH 3.5 and Free T4 1.01 on 5/3     Case seen and discussed with  and updated the primary team.  Pt can follow up at discharge with [facility] by calling  to make an appointment. INTERVAL HPI/OVERNIGHT EVENTS:    Patient is a 60y old  Male who presents with a chief complaint of Toxic Megacolon (04 May 2020 10:27)  Spoke to patient via phone and the primary team taking care of the patient.  No acute events  Pt reports decreased nausea and improved appetite though still only about 1/2 of his meals. Reports pain at surgical site.  Saturating well on RA  TSh was 3.5 and free T4 was 1.01 on 5/3  FSg and insulin administration reviewed    FSG & Insulin received:  Yesterday:  pre-dinner fs, 6 nutritional lispro   units. Ate chicken, veg, tomato soup, and jello  bedtime fs, 12 lantus   units + 2   units lispro SS. Had cheese sandwich at before FSG.  Today:  pre-breakfast fs, 6 nutritional lispro   units+ 2   units lispro SS. Ate egg and pears.  pre-lunch fs, 6 nutritional    Pt reports the following symptoms:  Negative, except as noted in HPI.  MEDICATIONS  (STANDING):  amLODIPine   Tablet 10 milliGRAM(s) Oral daily  atorvastatin 10 milliGRAM(s) Oral at bedtime  baclofen 2.5 milliGRAM(s) Oral every 12 hours  brimonidine 0.2% Ophthalmic Solution 1 Drop(s) Both EYES three times a day  chlorhexidine 2% Cloths 1 Application(s) Topical daily  dextrose 5%. 1000 milliLiter(s) (50 mL/Hr) IV Continuous <Continuous>  dextrose 50% Injectable 12.5 Gram(s) IV Push once  dextrose 50% Injectable 25 Gram(s) IV Push once  dextrose 50% Injectable 25 Gram(s) IV Push once  diphenoxylate/atropine 1 Tablet(s) Oral daily  dorzolamide 2% Ophthalmic Solution 1 Drop(s) Both EYES three times a day  gabapentin 300 milliGRAM(s) Oral every 8 hours  heparin   Injectable 7500 Unit(s) SubCutaneous every 8 hours  insulin glargine Injectable (LANTUS) 12 Unit(s) SubCutaneous at bedtime  insulin lispro (HumaLOG) corrective regimen sliding scale   SubCutaneous at bedtime  insulin lispro (HumaLOG) corrective regimen sliding scale   SubCutaneous three times a day before meals  insulin lispro Injectable (HumaLOG) 6 Unit(s) SubCutaneous three times a day before meals  magnesium sulfate  IVPB 2 Gram(s) IV Intermittent once  pantoprazole    Tablet 40 milliGRAM(s) Oral every 12 hours  rifAXIMin 550 milliGRAM(s) Oral two times a day  sodium bicarbonate 650 milliGRAM(s) Oral three times a day  traZODone 100 milliGRAM(s) Oral at bedtime    MEDICATIONS  (PRN):  acetaminophen   Tablet .. 1000 milliGRAM(s) Oral every 12 hours PRN Mild Pain (1 - 3), Moderate Pain (4 - 6)  dextrose 40% Gel 15 Gram(s) Oral once PRN Blood Glucose LESS THAN 70 milliGRAM(s)/deciliter  glucagon  Injectable 1 milliGRAM(s) IntraMuscular once PRN Glucose LESS THAN 70 milligrams/deciliter  ondansetron    Tablet 4 milliGRAM(s) Oral every 8 hours PRN Nausea and/or Vomiting      PHYSICAL EXAM  Vital Signs Last 24 Hrs  T(C): 37.2 (06 May 2020 05:40), Max: 37.2 (06 May 2020 05:40)  T(F): 98.9 (06 May 2020 05:40), Max: 98.9 (06 May 2020 05:40)  HR: 74 (06 May 2020 05:40) (66 - 79)  BP: 168/77 (06 May 2020 05:40) (144/70 - 168/77)  BP(mean): --  RR: 17 (06 May 2020 09:15) (17 - 20)  SpO2: 97% (06 May 2020 09:15) (96% - 97%)    Due to the nature of this patient’s COVID-19 isolation status (either confirmed or suspected), this note was prepared without a bedside physical examination to prevent spread of infection and to conserve personal protective equipment during this nationwide pandemic. If possible, direct patient communication occurred via electronic measures or telephone conversation. Examination highlights were provided by a bedside nurse wearing personal protective equipment and review of pertinent medical records. Objective data (vital signs, urine output, lab results, imaging studies, medications, etc) were reviewed in detail. Face to face visits and physical examination have been limited only to patients for whom it is required for medical decision making.    LABS:                        8.3    8.92  )-----------( 307      ( 06 May 2020 08:51 )             25.7     05-06    133<L>  |  98  |  29<H>  ----------------------------<  221<H>  4.8   |  21<L>  |  2.08<H>    Ca    8.8      06 May 2020 08:51  Phos  4.4     -  Mg     1.5     -    TPro  7.4  /  Alb  3.3  /  TBili  0.2  /  DBili  x   /  AST  18  /  ALT  20  /  AlkPhos  111        Urinalysis Basic - ( 04 May 2020 17:54 )    Color: Yellow / Appearance: Clear / SG: <=1.005 / pH: x  Gluc: x / Ketone: NEGATIVE  / Bili: Negative / Urobili: 0.2 E.U./dL   Blood: x / Protein: NEGATIVE mg/dL / Nitrite: POSITIVE   Leuk Esterase: NEGATIVE / RBC: < 5 /HPF / WBC < 5 /HPF   Sq Epi: x / Non Sq Epi: 0-5 /HPF / Bacteria: Present /HPF      Thyroid Stimulating Hormone, Serum: 3.509 uIU/mL ( @ 06:07)  Thyroid Stimulating Hormone, Serum: 3.325 uIU/mL ( @ 06:17)  Thyroid Stimulating Hormone, Serum: 2.813 uIU/mL ( @ 07:28)  Thyroid Stimulating Hormone, Serum: 2.877 uIU/mL ( @ 00:34)      HbA1C: 6.9 % ( @ 06:59)  7.6 % ( @ 05:58)    CAPILLARY BLOOD GLUCOSE      POCT Blood Glucose.: 170 mg/dL (06 May 2020 06:49)  POCT Blood Glucose.: 221 mg/dL (05 May 2020 21:52)  POCT Blood Glucose.: 135 mg/dL (05 May 2020 17:14)      A/P 60yMale with hx of DM now with COVID, worsening renal function, moderate hyperglycemia.     1.  DM: type 2  Please increase Lantus to 14 units at bedtime  PLease continue Lispro 6  units tid with meals.   Continue lispro moderate dose scale with meals and at bedtime.   Continue consistent carb diet  FSG Goal 100-180    2.  Hyperlipidemia - goal LDL < 70  - on lipitor 10 mg once daily    3.  Obesity - outpatient medical management.      4.  Hypothyroidism   - Please repeat TSH and free T4 on .  - TSH on admission 2.8.   - levothyroxine 50mcg discontinued on  to assess necessity as there is no evidence of autoimmune hypothyroidism at this time.   - Repeat TSH 3.32 and free T4 1.03 ().   - TSH 3.5 and Free T4 1.01 on 5/3     Case seen and discussed with  and updated the primary team.  Pt can follow up at discharge with [facility] by calling  to make an appointment.

## 2020-05-06 NOTE — PROGRESS NOTE ADULT - ATTENDING COMMENTS
Insulin requirements continue to increase slightly, and will raise the Lantus dose to 14 units given the AM fingerstick of 170 mg%.  Overall insulin doses are moderate, however, and will still plan to discharge on oral agents--pt will almost certainly be unable to manage a basal/bolus insulin regimen

## 2020-05-06 NOTE — PROGRESS NOTE ADULT - PROBLEM SELECTOR PLAN 5
Normocytic likely 2/2 chronic disease + kidney dysfunction vs less likely acute blood loss  -Hgb downtrended to 6.6 on 5/5, s/p unit PRBC on 5/5, H/H now stable at 8.3/25.7  -monitor hb, transfuse <7  -s/p 7U pRBCs total this admission as of 5/5/20  -pantoprazole 40mg BID   -monitor for signs of bleeding   -GI no longer following, states no longer any need for capsule endoscopy  -f/u hemolysis labs

## 2020-05-06 NOTE — PROGRESS NOTE ADULT - PROBLEM SELECTOR PLAN 7
Type 2 DM, endocrinology following   -lantus 12U qhs per endo  -lispro 6U TID pre-meal per endo  -mISS    #hypothyroidism   -TSH 2.813; TPO and antithyroid Abs neg   -levothyroxine 50mcg discontinued on 4/29 to assess necessity as there is no evidence of autoimmune hypothyroidism at this time.   - Repeat TSH 3.32 and free T4 1.03 (4/29).   - TSH 3.5 and Free T4 1.01 on 5/3   - repeat TSH and Free T4 ordered for Thursday 5/7

## 2020-05-06 NOTE — ADVANCED PRACTICE NURSE CONSULT - ASSESSMENT
1 piece [place] appliance brought to be used since pt has been having difficulty placing 2 piece appliance. Pt removed old appliance and attempted to cut hole in 1 piece for stoma with physical assistance. Assisted him in walking to bathroom to place appliance over stoma. Pt stated he couldn't see stoma because of his poor eyesight (also has very distended belly, which makes stoma more difficult to visualize). 1 piece appliance placed over stoma without pt's assistance. Pt is able to empty appliance and with each visit, there is a review of how many times the appliance should be changed and emptied.

## 2020-05-06 NOTE — PROGRESS NOTE ADULT - PROBLEM SELECTOR PLAN 10
F: none  E: replete prn, s/p Mag 2mg IV x1 for magnesium level 1.5  N: low fiber, consistent carbs  GI ppx: protonix BID   DVT ppx: Sub Q heparin, DVT neg by ultrasound  -social work re: going back to shelter vs placement w/ medical help and daily dressing changes.

## 2020-05-06 NOTE — PROGRESS NOTE ADULT - PROBLEM SELECTOR PLAN 3
Now s/p 3/31/20 ex lap, subtotal colectomy+ileostomy  course c/b fever and purulent wound infection (4/9), s/p vanc/zosyn.   -s/p laparotomy, no longer with wound vac, daily dressings.   -ileostomy in place draining brown stool with no signs of bleeding  -lomotil- low dose to prevent constipation/ hepatic encephalopthy  -general surgery following, appreciate recs  -woundcare as below    #s/p wound vac, now removed,  dressing in place, C/D/I  -woundcare NP following and continue teaching patient   -wet to dry dressings daily, surgery following

## 2020-05-06 NOTE — PROGRESS NOTE ADULT - SUBJECTIVE AND OBJECTIVE BOX
INCOMPLETE    OVERNIGHT EVENTS:  No acute events overnight.    SUBJECTIVE / INTERVAL HPI: Patient seen and examined at bedside.  Patient feeling well without any acute complaints.  Patient is frustrated with the hospital and what has happened but he denies any suicidal ideation.     VITAL SIGNS:  Vital Signs Last 24 Hrs  T(C): 37.2 (06 May 2020 05:40), Max: 37.2 (06 May 2020 05:40)  T(F): 98.9 (06 May 2020 05:40), Max: 98.9 (06 May 2020 05:40)  HR: 74 (06 May 2020 05:40) (66 - 79)  BP: 168/77 (06 May 2020 05:40) (144/70 - 168/77)  BP(mean): --  RR: 19 (06 May 2020 05:40) (18 - 20)  SpO2: 97% (06 May 2020 05:40) (96% - 97%)    PHYSICAL EXAM:    General: Lying in bed, NAD, on RA  HEENT: NC/AT, PERRL, anicteric sclera; MMM  Neck: supple  Gastrointestinal: distended, non tender, vertical dressing C/D/I, osteomy bag in place  Extremities: WWP, no edema or cyanosis  Vascular: 2+ radial, DP/PT pulses B/L  Neurological: AAOx3, no focal deficits    MEDICATIONS:  MEDICATIONS  (STANDING):  amLODIPine   Tablet 10 milliGRAM(s) Oral daily  atorvastatin 10 milliGRAM(s) Oral at bedtime  baclofen 2.5 milliGRAM(s) Oral every 12 hours  brimonidine 0.2% Ophthalmic Solution 1 Drop(s) Both EYES three times a day  chlorhexidine 2% Cloths 1 Application(s) Topical daily  dextrose 5%. 1000 milliLiter(s) (50 mL/Hr) IV Continuous <Continuous>  dextrose 50% Injectable 12.5 Gram(s) IV Push once  dextrose 50% Injectable 25 Gram(s) IV Push once  dextrose 50% Injectable 25 Gram(s) IV Push once  diphenoxylate/atropine 1 Tablet(s) Oral daily  dorzolamide 2% Ophthalmic Solution 1 Drop(s) Both EYES three times a day  gabapentin 300 milliGRAM(s) Oral every 8 hours  heparin   Injectable 7500 Unit(s) SubCutaneous every 8 hours  insulin glargine Injectable (LANTUS) 12 Unit(s) SubCutaneous at bedtime  insulin lispro (HumaLOG) corrective regimen sliding scale   SubCutaneous at bedtime  insulin lispro (HumaLOG) corrective regimen sliding scale   SubCutaneous three times a day before meals  insulin lispro Injectable (HumaLOG) 6 Unit(s) SubCutaneous three times a day before meals  magnesium sulfate  IVPB 2 Gram(s) IV Intermittent once  pantoprazole    Tablet 40 milliGRAM(s) Oral every 12 hours  rifAXIMin 550 milliGRAM(s) Oral two times a day  sodium bicarbonate 650 milliGRAM(s) Oral three times a day  traZODone 100 milliGRAM(s) Oral at bedtime    MEDICATIONS  (PRN):  acetaminophen   Tablet .. 1000 milliGRAM(s) Oral every 12 hours PRN Mild Pain (1 - 3), Moderate Pain (4 - 6)  dextrose 40% Gel 15 Gram(s) Oral once PRN Blood Glucose LESS THAN 70 milliGRAM(s)/deciliter  glucagon  Injectable 1 milliGRAM(s) IntraMuscular once PRN Glucose LESS THAN 70 milligrams/deciliter  ondansetron    Tablet 4 milliGRAM(s) Oral every 8 hours PRN Nausea and/or Vomiting      ALLERGIES:  Allergies    No Known Allergies    Intolerances        LABS:                        8.3    8.92  )-----------( 307      ( 06 May 2020 08:51 )             25.7     05-06    133<L>  |  98  |  29<H>  ----------------------------<  221<H>  4.8   |  21<L>  |  2.08<H>    Ca    8.8      06 May 2020 08:51  Phos  4.4     05-06  Mg     1.5     05-06    TPro  7.4  /  Alb  3.3  /  TBili  0.2  /  DBili  x   /  AST  18  /  ALT  20  /  AlkPhos  111  05-06      Urinalysis Basic - ( 04 May 2020 17:54 )    Color: Yellow / Appearance: Clear / SG: <=1.005 / pH: x  Gluc: x / Ketone: NEGATIVE  / Bili: Negative / Urobili: 0.2 E.U./dL   Blood: x / Protein: NEGATIVE mg/dL / Nitrite: POSITIVE   Leuk Esterase: NEGATIVE / RBC: < 5 /HPF / WBC < 5 /HPF   Sq Epi: x / Non Sq Epi: 0-5 /HPF / Bacteria: Present /HPF      CAPILLARY BLOOD GLUCOSE      POCT Blood Glucose.: 170 mg/dL (06 May 2020 06:49)      RADIOLOGY & ADDITIONAL TESTS: Reviewed.

## 2020-05-06 NOTE — PROGRESS NOTE ADULT - SUBJECTIVE AND OBJECTIVE BOX
STATUS POST:  Surgical pathology procedure  Transthoracic echocardiography (TTE)  US guided central cath  End ileostomy  Open subtotal colectomy  Transthoracic echocardiography (TTE)  CT chest wo con  CT abdomen pelvis  Limited B-scan ultrasound of retroperitoneal aorta    Patient seen and examined at bedside. In no acute distress. Denies N/V. Is tolerating diet, having BM via ostomy. Pain well controlled.     OBJECTIVE:    Vital Signs Last 24 Hrs  T(C): 36.3 (06 May 2020 12:28), Max: 37.2 (06 May 2020 05:40)  T(F): 97.4 (06 May 2020 12:28), Max: 98.9 (06 May 2020 05:40)  HR: 68 (06 May 2020 13:00) (66 - 79)  BP: 144/72 (06 May 2020 13:00) (139/66 - 168/77)  BP(mean): --  RR: 18 (06 May 2020 12:28) (17 - 20)  SpO2: 98% (06 May 2020 13:00) (96% - 98%)    I&O's Summary    05 May 2020 07:01  -  06 May 2020 07:00  --------------------------------------------------------  IN: 500 mL / OUT: 800 mL / NET: -300 mL        Physical Exam:  General Appearance: Appears well, NAD  HEENT: Grossly symmetric  Chest: Non labored breathing  CV: Pulse regular presently  Abdomen: Soft, nontender, nondistended, midline w/ good granulation tissue, ostomy w/ large amounts of soft stool  Extremities: Grossly symmetric, no swelling, warm.     LABS:                        8.3    8.92  )-----------( 307      ( 06 May 2020 08:51 )             25.7     05-06    133<L>  |  98  |  29<H>  ----------------------------<  221<H>  4.8   |  21<L>  |  2.08<H>    Ca    8.8      06 May 2020 08:51  Phos  4.4     05-06  Mg     1.5     05-06    TPro  7.4  /  Alb  3.3  /  TBili  0.2  /  DBili  x   /  AST  18  /  ALT  20  /  AlkPhos  111  05-06      Urinalysis Basic - ( 04 May 2020 17:54 )    Color: Yellow / Appearance: Clear / SG: <=1.005 / pH: x  Gluc: x / Ketone: NEGATIVE  / Bili: Negative / Urobili: 0.2 E.U./dL   Blood: x / Protein: NEGATIVE mg/dL / Nitrite: POSITIVE   Leuk Esterase: NEGATIVE / RBC: < 5 /HPF / WBC < 5 /HPF   Sq Epi: x / Non Sq Epi: 0-5 /HPF / Bacteria: Present /HPF        RADIOLOGY & ADDITIONAL STUDIES:

## 2020-05-06 NOTE — PROGRESS NOTE ADULT - PROBLEM SELECTOR PLAN 8
Home regimen: amlodipine 10mg qd, HCTZ, Lisinopril  -continue amlodipine 10mg QD  -hold HCTZ Lisinopril in setting of MEG  -monitor BP    #HLD  Home regimen: pravastatin 40mg qhs  -continue statin as therapeutic interchange for atorvastatin

## 2020-05-06 NOTE — PROGRESS NOTE ADULT - ATTENDING COMMENTS
24Hr Events: Received 1 unit PRBCx  C/o feeling persistently cold in current room, neuropathic pain, frustrated w/ medical situation otherwise ROS neg  Afeb, 97% on RA, exam unchanged  Inflam markers trending down  Hb appropriately increased s/p xfusion  Previous w/u for anemia neg  60M PMH HTN, DM + neuropathy, IVDU, ETOH liver cirrhosis, presents to [facility] s/p fall at home; found with toxic megacolon and pneumatosis; now s/p ex lap + subtotal colectomy, ileostomy w/ course c/b MEG, anemia.  No signs of bleeding, w/u neg, Hb appropriate  Renal fnxn stable, no need for HD  DM regimen per endo  Change Zofran to po  Medically ready for d/c but undomiciled w/ high needs, f/u SW

## 2020-05-06 NOTE — PROGRESS NOTE ADULT - PROBLEM SELECTOR PLAN 9
-Increased trazadone to back to full home dose at 100 mg PO daily     #suicidal ideation - RESOLVED.    initially with ?? SI, psychiatry consult cleared for discharge; no additional psychiatric medications, continue trazodone; no utility in inpatient psych, likely adjustment disorder with depressive mood related to social issues  - after full discussion again with patient he is frustrated with his hospital stay but has no SI and no Plan.  No plan to reconsult psych consult.  Ordered for Chaplaincy consult.

## 2020-05-06 NOTE — PROGRESS NOTE ADULT - PROBLEM SELECTOR PLAN 6
Per patient, home regimen: gabapentin 1200mg TID (confirmed with pharmacy), which is well over daily limit for gabapentin use  -GFR improved 5/5, increased does of gabapentin from 200 mg TID to 300 mg TID, stable renal function, continue to monitor and adjust

## 2020-05-06 NOTE — PROGRESS NOTE ADULT - ASSESSMENT
59 y/o M with PMH of HTN, DM complicated by neuropathy, EtOH liver cirrhosis, presenting w/ pneumatosis of unknown etiology, s/p sub total colectomy and end ileostomy on 3/31, c/b fever and purulent wound infection (4/9) now s/p paracentesis 4/13 found to be COVID (+). Clinically stable. In no acute distress.     -c/w lomotil  -trend ostomy output  -dispo planning and care per medicine

## 2020-05-07 LAB
ALBUMIN SERPL ELPH-MCNC: 3.3 G/DL — SIGNIFICANT CHANGE UP (ref 3.3–5)
ALP SERPL-CCNC: 114 U/L — SIGNIFICANT CHANGE UP (ref 40–120)
ALT FLD-CCNC: 20 U/L — SIGNIFICANT CHANGE UP (ref 10–45)
ANION GAP SERPL CALC-SCNC: 14 MMOL/L — SIGNIFICANT CHANGE UP (ref 5–17)
APPEARANCE UR: CLEAR — SIGNIFICANT CHANGE UP
AST SERPL-CCNC: 17 U/L — SIGNIFICANT CHANGE UP (ref 10–40)
BASOPHILS # BLD AUTO: 0.01 K/UL — SIGNIFICANT CHANGE UP (ref 0–0.2)
BASOPHILS NFR BLD AUTO: 0.1 % — SIGNIFICANT CHANGE UP (ref 0–2)
BILIRUB SERPL-MCNC: 0.2 MG/DL — SIGNIFICANT CHANGE UP (ref 0.2–1.2)
BILIRUB UR-MCNC: NEGATIVE — SIGNIFICANT CHANGE UP
BLD GP AB SCN SERPL QL: NEGATIVE — SIGNIFICANT CHANGE UP
BUN SERPL-MCNC: 32 MG/DL — HIGH (ref 7–23)
CALCIUM SERPL-MCNC: 9.2 MG/DL — SIGNIFICANT CHANGE UP (ref 8.4–10.5)
CHLORIDE SERPL-SCNC: 101 MMOL/L — SIGNIFICANT CHANGE UP (ref 96–108)
CO2 SERPL-SCNC: 21 MMOL/L — LOW (ref 22–31)
COLOR SPEC: YELLOW — SIGNIFICANT CHANGE UP
CREAT ?TM UR-MCNC: 25 MG/DL — SIGNIFICANT CHANGE UP
CREAT SERPL-MCNC: 1.81 MG/DL — HIGH (ref 0.5–1.3)
CRP SERPL-MCNC: 5.7 MG/DL — HIGH (ref 0–0.4)
D DIMER BLD IA.RAPID-MCNC: 1175 NG/ML DDU — HIGH
DIFF PNL FLD: ABNORMAL
EOSINOPHIL # BLD AUTO: 0.27 K/UL — SIGNIFICANT CHANGE UP (ref 0–0.5)
EOSINOPHIL NFR BLD AUTO: 3.5 % — SIGNIFICANT CHANGE UP (ref 0–6)
FERRITIN SERPL-MCNC: 661 NG/ML — HIGH (ref 30–400)
GLUCOSE BLDC GLUCOMTR-MCNC: 111 MG/DL — HIGH (ref 70–99)
GLUCOSE BLDC GLUCOMTR-MCNC: 158 MG/DL — HIGH (ref 70–99)
GLUCOSE BLDC GLUCOMTR-MCNC: 162 MG/DL — HIGH (ref 70–99)
GLUCOSE BLDC GLUCOMTR-MCNC: 163 MG/DL — HIGH (ref 70–99)
GLUCOSE BLDC GLUCOMTR-MCNC: 296 MG/DL — HIGH (ref 70–99)
GLUCOSE SERPL-MCNC: 138 MG/DL — HIGH (ref 70–99)
GLUCOSE UR QL: NEGATIVE — SIGNIFICANT CHANGE UP
HCT VFR BLD CALC: 24.5 % — LOW (ref 39–50)
HGB BLD-MCNC: 8 G/DL — LOW (ref 13–17)
IMM GRANULOCYTES NFR BLD AUTO: 1.6 % — HIGH (ref 0–1.5)
KETONES UR-MCNC: NEGATIVE — SIGNIFICANT CHANGE UP
LEUKOCYTE ESTERASE UR-ACNC: NEGATIVE — SIGNIFICANT CHANGE UP
LYMPHOCYTES # BLD AUTO: 1.01 K/UL — SIGNIFICANT CHANGE UP (ref 1–3.3)
LYMPHOCYTES # BLD AUTO: 13.3 % — SIGNIFICANT CHANGE UP (ref 13–44)
MAGNESIUM SERPL-MCNC: 1.8 MG/DL — SIGNIFICANT CHANGE UP (ref 1.6–2.6)
MCHC RBC-ENTMCNC: 29 PG — SIGNIFICANT CHANGE UP (ref 27–34)
MCHC RBC-ENTMCNC: 32.7 GM/DL — SIGNIFICANT CHANGE UP (ref 32–36)
MCV RBC AUTO: 88.8 FL — SIGNIFICANT CHANGE UP (ref 80–100)
MONOCYTES # BLD AUTO: 0.81 K/UL — SIGNIFICANT CHANGE UP (ref 0–0.9)
MONOCYTES NFR BLD AUTO: 10.6 % — SIGNIFICANT CHANGE UP (ref 2–14)
NEUTROPHILS # BLD AUTO: 5.4 K/UL — SIGNIFICANT CHANGE UP (ref 1.8–7.4)
NEUTROPHILS NFR BLD AUTO: 70.9 % — SIGNIFICANT CHANGE UP (ref 43–77)
NITRITE UR-MCNC: NEGATIVE — SIGNIFICANT CHANGE UP
NRBC # BLD: 0 /100 WBCS — SIGNIFICANT CHANGE UP (ref 0–0)
PH UR: 6.5 — SIGNIFICANT CHANGE UP (ref 5–8)
PHOSPHATE SERPL-MCNC: 4.8 MG/DL — HIGH (ref 2.5–4.5)
PLATELET # BLD AUTO: 308 K/UL — SIGNIFICANT CHANGE UP (ref 150–400)
POTASSIUM SERPL-MCNC: 4.5 MMOL/L — SIGNIFICANT CHANGE UP (ref 3.5–5.3)
POTASSIUM SERPL-SCNC: 4.5 MMOL/L — SIGNIFICANT CHANGE UP (ref 3.5–5.3)
PROT SERPL-MCNC: 7.4 G/DL — SIGNIFICANT CHANGE UP (ref 6–8.3)
PROT UR-MCNC: ABNORMAL MG/DL
RBC # BLD: 2.76 M/UL — LOW (ref 4.2–5.8)
RBC # FLD: 14.6 % — HIGH (ref 10.3–14.5)
RH IG SCN BLD-IMP: POSITIVE — SIGNIFICANT CHANGE UP
SODIUM SERPL-SCNC: 136 MMOL/L — SIGNIFICANT CHANGE UP (ref 135–145)
SODIUM UR-SCNC: 52 MMOL/L — SIGNIFICANT CHANGE UP
SP GR SPEC: 1.01 — SIGNIFICANT CHANGE UP (ref 1–1.03)
T4 FREE SERPL-MCNC: 0.98 NG/DL — SIGNIFICANT CHANGE UP (ref 0.7–1.48)
TSH SERPL-MCNC: 2.71 UIU/ML — SIGNIFICANT CHANGE UP (ref 0.35–4.94)
TSH SERPL-MCNC: 3.06 UIU/ML — SIGNIFICANT CHANGE UP (ref 0.35–4.94)
UROBILINOGEN FLD QL: 0.2 E.U./DL — SIGNIFICANT CHANGE UP
WBC # BLD: 7.62 K/UL — SIGNIFICANT CHANGE UP (ref 3.8–10.5)
WBC # FLD AUTO: 7.62 K/UL — SIGNIFICANT CHANGE UP (ref 3.8–10.5)

## 2020-05-07 PROCEDURE — 99231 SBSQ HOSP IP/OBS SF/LOW 25: CPT | Mod: GC

## 2020-05-07 PROCEDURE — 99232 SBSQ HOSP IP/OBS MODERATE 35: CPT

## 2020-05-07 RX ORDER — MAGNESIUM SULFATE 500 MG/ML
1 VIAL (ML) INJECTION ONCE
Refills: 0 | Status: COMPLETED | OUTPATIENT
Start: 2020-05-07 | End: 2020-05-07

## 2020-05-07 RX ADMIN — Medication 6 UNIT(S): at 11:44

## 2020-05-07 RX ADMIN — HEPARIN SODIUM 7500 UNIT(S): 5000 INJECTION INTRAVENOUS; SUBCUTANEOUS at 13:41

## 2020-05-07 RX ADMIN — Medication 2.5 MILLIGRAM(S): at 17:07

## 2020-05-07 RX ADMIN — Medication 100 MILLIGRAM(S): at 22:50

## 2020-05-07 RX ADMIN — Medication 650 MILLIGRAM(S): at 06:22

## 2020-05-07 RX ADMIN — Medication 6 UNIT(S): at 17:04

## 2020-05-07 RX ADMIN — DORZOLAMIDE HYDROCHLORIDE 1 DROP(S): 20 SOLUTION/ DROPS OPHTHALMIC at 14:23

## 2020-05-07 RX ADMIN — BRIMONIDINE TARTRATE 1 DROP(S): 2 SOLUTION/ DROPS OPHTHALMIC at 14:23

## 2020-05-07 RX ADMIN — Medication 2: at 17:05

## 2020-05-07 RX ADMIN — Medication 1 TABLET(S): at 11:59

## 2020-05-07 RX ADMIN — Medication 6: at 09:22

## 2020-05-07 RX ADMIN — Medication 1000 MILLIGRAM(S): at 09:48

## 2020-05-07 RX ADMIN — GABAPENTIN 300 MILLIGRAM(S): 400 CAPSULE ORAL at 13:42

## 2020-05-07 RX ADMIN — GABAPENTIN 300 MILLIGRAM(S): 400 CAPSULE ORAL at 06:23

## 2020-05-07 RX ADMIN — Medication 1000 MILLIGRAM(S): at 22:50

## 2020-05-07 RX ADMIN — Medication 2.5 MILLIGRAM(S): at 06:17

## 2020-05-07 RX ADMIN — CHLORHEXIDINE GLUCONATE 1 APPLICATION(S): 213 SOLUTION TOPICAL at 12:00

## 2020-05-07 RX ADMIN — GABAPENTIN 300 MILLIGRAM(S): 400 CAPSULE ORAL at 22:49

## 2020-05-07 RX ADMIN — BRIMONIDINE TARTRATE 1 DROP(S): 2 SOLUTION/ DROPS OPHTHALMIC at 06:24

## 2020-05-07 RX ADMIN — Medication 100 GRAM(S): at 10:40

## 2020-05-07 RX ADMIN — HEPARIN SODIUM 7500 UNIT(S): 5000 INJECTION INTRAVENOUS; SUBCUTANEOUS at 06:23

## 2020-05-07 RX ADMIN — AMLODIPINE BESYLATE 10 MILLIGRAM(S): 2.5 TABLET ORAL at 06:23

## 2020-05-07 RX ADMIN — INSULIN GLARGINE 14 UNIT(S): 100 INJECTION, SOLUTION SUBCUTANEOUS at 22:40

## 2020-05-07 RX ADMIN — Medication 6 UNIT(S): at 09:21

## 2020-05-07 RX ADMIN — ATORVASTATIN CALCIUM 10 MILLIGRAM(S): 80 TABLET, FILM COATED ORAL at 22:49

## 2020-05-07 RX ADMIN — Medication 650 MILLIGRAM(S): at 13:42

## 2020-05-07 RX ADMIN — PANTOPRAZOLE SODIUM 40 MILLIGRAM(S): 20 TABLET, DELAYED RELEASE ORAL at 06:23

## 2020-05-07 RX ADMIN — Medication 650 MILLIGRAM(S): at 22:50

## 2020-05-07 RX ADMIN — HEPARIN SODIUM 7500 UNIT(S): 5000 INJECTION INTRAVENOUS; SUBCUTANEOUS at 22:51

## 2020-05-07 RX ADMIN — Medication 1: at 22:35

## 2020-05-07 RX ADMIN — DORZOLAMIDE HYDROCHLORIDE 1 DROP(S): 20 SOLUTION/ DROPS OPHTHALMIC at 06:24

## 2020-05-07 RX ADMIN — PANTOPRAZOLE SODIUM 40 MILLIGRAM(S): 20 TABLET, DELAYED RELEASE ORAL at 17:07

## 2020-05-07 NOTE — PROGRESS NOTE ADULT - SUBJECTIVE AND OBJECTIVE BOX
no acute events overnight   renal fx improving  acceptable electrolytes   Deloris 52, high   not in distress, breathing comfortably on RA, sat 97%       Meds:  acetaminophen   Tablet .. 1000 every 12 hours PRN  amLODIPine   Tablet 10 daily  atorvastatin 10 at bedtime  baclofen 2.5 every 12 hours  brimonidine 0.2% Ophthalmic Solution 1 three times a day  chlorhexidine 2% Cloths 1 daily  dextrose 40% Gel 15 once PRN  dextrose 5%. 1000 <Continuous>  dextrose 50% Injectable 12.5 once  dextrose 50% Injectable 25 once  dextrose 50% Injectable 25 once  diphenoxylate/atropine 1 daily  dorzolamide 2% Ophthalmic Solution 1 three times a day  gabapentin 300 every 8 hours  glucagon  Injectable 1 once PRN  heparin   Injectable 7500 every 8 hours  insulin glargine Injectable (LANTUS) 14 at bedtime  insulin lispro (HumaLOG) corrective regimen sliding scale  at bedtime  insulin lispro (HumaLOG) corrective regimen sliding scale  three times a day before meals  insulin lispro Injectable (HumaLOG) 6 three times a day before meals  ondansetron    Tablet 4 every 8 hours PRN  pantoprazole    Tablet 40 every 12 hours  rifAXIMin 550 two times a day  sodium bicarbonate 650 three times a day  traZODone 100 at bedtime      T(C): , Max: 37 (20 @ 06:16)  T(F): , Max: 98.6 (20 @ 06:16)  HR: 76 (20 @ 12:28)  BP: 135/67 (20 @ 12:28)  BP(mean): --  RR: 18 (20 @ 12:28)  SpO2: 99% (20 @ 12:28)  Wt(kg): --     @ 07:01  -   @ 07:00  --------------------------------------------------------  IN: 200 mL / OUT: 400 mL / NET: -200 mL          Review of Systems:  deferred due to covid isolation protocol      PHYSICAL EXAM:  deferred due to covid isolation protocol      LABS:                        8.0    7.62  )-----------( 308      ( 07 May 2020 06:31 )             24.5     -    136  |  101  |  32<H>  ----------------------------<  138<H>  4.5   |  21<L>  |  1.81<H>    Ca    9.2      07 May 2020 06:31  Phos  4.8       Mg     1.8         TPro  7.4  /  Alb  3.3  /  TBili  0.2  /  DBili  x   /  AST  17  /  ALT  20  /  AlkPhos  114          Urinalysis Basic - ( 07 May 2020 11:55 )    Color: Yellow / Appearance: Clear / S.010 / pH: x  Gluc: x / Ketone: NEGATIVE  / Bili: Negative / Urobili: 0.2 E.U./dL   Blood: x / Protein: Trace mg/dL / Nitrite: NEGATIVE   Leuk Esterase: NEGATIVE / RBC: < 5 /HPF / WBC < 5 /HPF   Sq Epi: x / Non Sq Epi: 0-5 /HPF / Bacteria: Present /HPF      Creatinine, Random Urine: 25 mg/dL ( @ 11:54)  Sodium, Random Urine: 52 mmol/L ( @ 11:54)        RADIOLOGY & ADDITIONAL STUDIES:    reviewed

## 2020-05-07 NOTE — PROGRESS NOTE ADULT - ASSESSMENT
61 y/o M with PMH of HTN, DM complicated by neuropathy, IVDU, ETOH liver cirrhosis, who presents to [facility] for fall at home; found to have toxic megacolon and pneumatosis; underwent ex lap and subtotal colectomy, and ileostomy on 3/31. COVID +       # Non-oliguric MEG, likely perfusional ATN  - FeNa cw intrinsic cause   - improving renal fx w acceptable electrolytes  - keep euvolemic   - monitor BUN/Cr, lytes, uop   - on sodium bicarbonate 650 mg po q8hr     # Hypertension  - on amlodipine 10 mg po qd  - low salt diet     # Anemia  - Hb 8.0 g/dl, stable  - monitor H/H, transfuse as indicated

## 2020-05-07 NOTE — PROGRESS NOTE ADULT - SUBJECTIVE AND OBJECTIVE BOX
STATUS POST:  Surgical pathology procedure  Transthoracic echocardiography (TTE)  US guided central cath  End ileostomy  Open subtotal colectomy  Transthoracic echocardiography (TTE)  CT chest wo con  CT abdomen pelvis  Limited B-scan ultrasound of retroperitoneal aorta    Patient seen and examined at bedside. In no acute distress. Denies N/V. Is tolerating diet, having BM via ostomy. Pain well controlled.     OBJECTIVE:    Vital Signs Last 24 Hrs  T(C): 37 (07 May 2020 06:16), Max: 37 (07 May 2020 06:16)  T(F): 98.6 (07 May 2020 06:16), Max: 98.6 (07 May 2020 06:16)  HR: 76 (07 May 2020 06:16) (68 - 76)  BP: 164/74 (07 May 2020 06:16) (139/66 - 164/74)  BP(mean): --  RR: 18 (07 May 2020 06:16) (17 - 18)  SpO2: 97% (07 May 2020 06:16) (96% - 98%)    I&O's Summary    06 May 2020 07:01  -  07 May 2020 07:00  --------------------------------------------------------  IN: 200 mL / OUT: 400 mL / NET: -200 mL        Physical Exam:  General Appearance: Appears well, NAD  HEENT: Grossly symmetric  Chest: Non labored breathing  CV: Pulse regular presently  Abdomen: Soft, nontender, nondistended, midline w/ good granulation tissue, ostomy w/ large amounts of soft stool  Extremities: Grossly symmetric, no swelling, warm.       LABS:                        8.0    7.62  )-----------( 308      ( 07 May 2020 06:31 )             24.5     05-07    136  |  101  |  32<H>  ----------------------------<  138<H>  4.5   |  21<L>  |  1.81<H>    Ca    9.2      07 May 2020 06:31  Phos  4.8     05-07  Mg     1.8     05-07    TPro  7.4  /  Alb  3.3  /  TBili  0.2  /  DBili  x   /  AST  17  /  ALT  20  /  AlkPhos  114  05-07          RADIOLOGY & ADDITIONAL STUDIES:

## 2020-05-07 NOTE — PROGRESS NOTE ADULT - PROBLEM SELECTOR PLAN 6
home regimen: gabapentin 1200mg TID (confirmed with pharmacy), which is well over daily limit for gabapentin use  -GFR improved 5/5, increased does of gabapentin from 200 mg TID to 300 mg TID, stable renal function, continue to monitor and adjust

## 2020-05-07 NOTE — PROGRESS NOTE ADULT - SUBJECTIVE AND OBJECTIVE BOX
OVERNIGHT EVENTS: No acute events overnight.  SUBJECTIVE: Patient seen and examined at the bedside. No new complaints at this time. Denies sob, pain. 12-point ROS reviewed and is otherwise negative.    Vital Signs Last 12 Hrs  T(F): 98.6 (05-07-20 @ 06:16), Max: 98.6 (05-07-20 @ 06:16)  HR: 76 (05-07-20 @ 06:16) (68 - 76)  BP: 164/74 (05-07-20 @ 06:16) (150/71 - 164/74)  BP(mean): --  RR: 18 (05-07-20 @ 06:16) (18 - 18)  SpO2: 97% (05-07-20 @ 06:16) (96% - 97%)    I&O's Summary  06 May 2020 07:01  -  07 May 2020 07:00  --------------------------------------------------------  IN: 200 mL / OUT: 400 mL / NET: -200 mL    PHYSICAL EXAM:  General: NAD, resting comfortably in bed  HEENT: NCAT, EOMI, MMM  Neck: supple  Respiratory: CTA, normal respiratory rate/depth/effort  Cardiovascular: normal S1/S2, RRR, no MRG  Vascular: 2+ radial/DP pulses b/l  Abdomen: NT, soft, distended, ileostomy output nonbloody; midline wound dressing c/d/i  Extremities: WWP, no edema  Neuro: AAOx3    LABS:                        8.0    7.62  )-----------( 308      ( 07 May 2020 06:31 )             24.5     05-07    136  |  101  |  32<H>  ----------------------------<  138<H>  4.5   |  21<L>  |  1.81<H>    Ca    9.2      07 May 2020 06:31  Phos  4.8     05-07  Mg     1.8     05-07    TPro  7.4  /  Alb  3.3  /  TBili  0.2  /  DBili  x   /  AST  17  /  ALT  20  /  AlkPhos  114  05-07    RADIOLOGY & ADDITIONAL TESTS: Reviewed    MEDICATIONS  (STANDING):  amLODIPine   Tablet 10 milliGRAM(s) Oral daily  atorvastatin 10 milliGRAM(s) Oral at bedtime  baclofen 2.5 milliGRAM(s) Oral every 12 hours  brimonidine 0.2% Ophthalmic Solution 1 Drop(s) Both EYES three times a day  chlorhexidine 2% Cloths 1 Application(s) Topical daily  dextrose 5%. 1000 milliLiter(s) (50 mL/Hr) IV Continuous <Continuous>  dextrose 50% Injectable 12.5 Gram(s) IV Push once  dextrose 50% Injectable 25 Gram(s) IV Push once  dextrose 50% Injectable 25 Gram(s) IV Push once  diphenoxylate/atropine 1 Tablet(s) Oral daily  dorzolamide 2% Ophthalmic Solution 1 Drop(s) Both EYES three times a day  gabapentin 300 milliGRAM(s) Oral every 8 hours  heparin   Injectable 7500 Unit(s) SubCutaneous every 8 hours  insulin glargine Injectable (LANTUS) 14 Unit(s) SubCutaneous at bedtime  insulin lispro (HumaLOG) corrective regimen sliding scale   SubCutaneous at bedtime  insulin lispro (HumaLOG) corrective regimen sliding scale   SubCutaneous three times a day before meals  insulin lispro Injectable (HumaLOG) 6 Unit(s) SubCutaneous three times a day before meals  magnesium sulfate  IVPB 1 Gram(s) IV Intermittent once  pantoprazole    Tablet 40 milliGRAM(s) Oral every 12 hours  rifAXIMin 550 milliGRAM(s) Oral two times a day  sodium bicarbonate 650 milliGRAM(s) Oral three times a day  traZODone 100 milliGRAM(s) Oral at bedtime    MEDICATIONS  (PRN):  acetaminophen   Tablet .. 1000 milliGRAM(s) Oral every 12 hours PRN Mild Pain (1 - 3), Moderate Pain (4 - 6)  dextrose 40% Gel 15 Gram(s) Oral once PRN Blood Glucose LESS THAN 70 milliGRAM(s)/deciliter  glucagon  Injectable 1 milliGRAM(s) IntraMuscular once PRN Glucose LESS THAN 70 milligrams/deciliter  ondansetron    Tablet 4 milliGRAM(s) Oral every 8 hours PRN Nausea and/or Vomiting

## 2020-05-07 NOTE — PROGRESS NOTE ADULT - PROBLEM SELECTOR PLAN 9
-Increased trazodone to back to full home dose at 100 mg PO daily     #suicidal ideation - RESOLVED   initially with ?? SI, psychiatry consult cleared for discharge; no additional psychiatric medications, continue trazodone; no utility in inpatient psych, likely adjustment disorder with depressive mood related to social issues  - after full discussion again with patient he is frustrated with his hospital stay but has no SI and no Plan.  No plan to reconsult psych consult.  Ordered for Chaplaincy consult.

## 2020-05-07 NOTE — PROGRESS NOTE ADULT - ATTENDING COMMENTS
Glucoses have stabilized in the 100-180 range, and will continue the current insulin regimen for now.  (Still plan to discharge on orals)  TFTs remain normal off levothyroxine, and will not restart at this point

## 2020-05-07 NOTE — PROGRESS NOTE ADULT - SUBJECTIVE AND OBJECTIVE BOX
INTERVAL HPI/OVERNIGHT EVENTS:    Patient is a 60y old  Male who presents with a chief complaint of toxic megacolon, COVID + (06 May 2020 10:01)      Pt reports the following symptoms:    CONSTITUTIONAL:  Negative fever or chills, feels well, good appetite  EYES:  Negative  blurry vision or double vision  CARDIOVASCULAR:  Negative for chest pain or palpitations  RESPIRATORY:  Negative for cough, wheezing, or SOB   GASTROINTESTINAL:  Negative for nausea, vomiting, diarrhea, constipation, or abdominal pain  GENITOURINARY:  Negative frequency, urgency or dysuria  NEUROLOGIC:  No headache, confusion, dizziness, lightheadedness    MEDICATIONS  (STANDING):  amLODIPine   Tablet 10 milliGRAM(s) Oral daily  atorvastatin 10 milliGRAM(s) Oral at bedtime  baclofen 2.5 milliGRAM(s) Oral every 12 hours  brimonidine 0.2% Ophthalmic Solution 1 Drop(s) Both EYES three times a day  chlorhexidine 2% Cloths 1 Application(s) Topical daily  dextrose 5%. 1000 milliLiter(s) (50 mL/Hr) IV Continuous <Continuous>  dextrose 50% Injectable 12.5 Gram(s) IV Push once  dextrose 50% Injectable 25 Gram(s) IV Push once  dextrose 50% Injectable 25 Gram(s) IV Push once  diphenoxylate/atropine 1 Tablet(s) Oral daily  dorzolamide 2% Ophthalmic Solution 1 Drop(s) Both EYES three times a day  gabapentin 300 milliGRAM(s) Oral every 8 hours  heparin   Injectable 7500 Unit(s) SubCutaneous every 8 hours  insulin glargine Injectable (LANTUS) 14 Unit(s) SubCutaneous at bedtime  insulin lispro (HumaLOG) corrective regimen sliding scale   SubCutaneous at bedtime  insulin lispro (HumaLOG) corrective regimen sliding scale   SubCutaneous three times a day before meals  insulin lispro Injectable (HumaLOG) 6 Unit(s) SubCutaneous three times a day before meals  pantoprazole    Tablet 40 milliGRAM(s) Oral every 12 hours  rifAXIMin 550 milliGRAM(s) Oral two times a day  sodium bicarbonate 650 milliGRAM(s) Oral three times a day  traZODone 100 milliGRAM(s) Oral at bedtime    MEDICATIONS  (PRN):  acetaminophen   Tablet .. 1000 milliGRAM(s) Oral every 12 hours PRN Mild Pain (1 - 3), Moderate Pain (4 - 6)  dextrose 40% Gel 15 Gram(s) Oral once PRN Blood Glucose LESS THAN 70 milliGRAM(s)/deciliter  glucagon  Injectable 1 milliGRAM(s) IntraMuscular once PRN Glucose LESS THAN 70 milligrams/deciliter  ondansetron    Tablet 4 milliGRAM(s) Oral every 8 hours PRN Nausea and/or Vomiting      PHYSICAL EXAM  Vital Signs Last 24 Hrs  T(C): 37 (07 May 2020 06:16), Max: 37 (07 May 2020 06:16)  T(F): 98.6 (07 May 2020 06:16), Max: 98.6 (07 May 2020 06:16)  HR: 76 (07 May 2020 06:16) (68 - 76)  BP: 164/74 (07 May 2020 06:16) (139/66 - 164/74)  BP(mean): --  RR: 18 (07 May 2020 06:16) (18 - 18)  SpO2: 97% (07 May 2020 06:16) (96% - 98%)    Constitutional: wn/wd in NAD.   HEENT: NCAT, MMM, OP clear, EOMI, no proptosis or lid retraction  Neck: no thyromegaly or palpable thyroid nodules   Respiratory: lungs CTAB.  Cardiovascular: regular rhythm, normal S1 and S2, no audible murmurs, no peripheral edema  GI: soft, NT/ND, no masses/HSM appreciated.  Neurology: no tremors, DTR 2+  Skin: no visible rashes/lesions  Psychiatric: AAO x 3, normal affect/mood.    LABS:                        8.0    7.62  )-----------( 308      ( 07 May 2020 06:31 )             24.5     05-07    136  |  101  |  32<H>  ----------------------------<  138<H>  4.5   |  21<L>  |  1.81<H>    Ca    9.2      07 May 2020 06:31  Phos  4.8     05-07  Mg     1.8     05-07    TPro  7.4  /  Alb  3.3  /  TBili  0.2  /  DBili  x   /  AST  17  /  ALT  20  /  AlkPhos  114  05-07        Thyroid Stimulating Hormone, Serum: 2.712 uIU/mL (05-07 @ 06:31)  Thyroid Stimulating Hormone, Serum: 3.063 uIU/mL (05-07 @ 06:31)  Thyroid Stimulating Hormone, Serum: 3.509 uIU/mL (05-03 @ 06:07)  Thyroid Stimulating Hormone, Serum: 3.325 uIU/mL (04-29 @ 06:17)  Thyroid Stimulating Hormone, Serum: 2.813 uIU/mL (04-14 @ 07:28)  Thyroid Stimulating Hormone, Serum: 2.877 uIU/mL (04-14 @ 00:34)      HbA1C: 6.9 % (04-05 @ 06:59)  7.6 % (03-31 @ 05:58)    CAPILLARY BLOOD GLUCOSE      POCT Blood Glucose.: 296 mg/dL (07 May 2020 09:19)  POCT Blood Glucose.: 158 mg/dL (07 May 2020 07:38)  POCT Blood Glucose.: 168 mg/dL (06 May 2020 21:35)  POCT Blood Glucose.: 214 mg/dL (06 May 2020 16:43)  POCT Blood Glucose.: 136 mg/dL (06 May 2020 11:33)      Insulin Sliding Scale requirements X 24 Hours:    RADIOLOGY & ADDITIONAL TESTS:    A/P: 60y Male with history of DM type II presenting for       1.  DM -     Please continue           units lantus at bedtime  / in the morning and        units lispro with meals and lispro moderate / low dose sliding scale 4 times daily with meals and at bedtime.  Please continue consistent carbohydrate diet.      Goal FSG is   Will continue to monitor   For discharge, pt can continue    Pt can follow up at discharge with [facility] by calling  to make an appointment.   Will discuss case with     and update primary team INTERVAL HPI/OVERNIGHT EVENTS:    Patient is a 60y old  Male who presents with a chief complaint of toxic megacolon, COVID + (06 May 2020 10:01)  Spoke to patient via phone and the primary team taking care of the patient.  No acute events  Saturating well on RA. appetite is good.  TSh was 2.712 and free T4 was 0.98 on   FSg and insulin administration reviewed    FSG & Insulin received:  Yesterday:  pre-dinner fs, 6 nutritional lispro   units + 4 .   bedtime fs, 14 lantus   units + 1  units lispro SS  Today:  pre-breakfast fs  1 hour after BF FSG - 296, , 6 nutritional lispro   units+ 6   units lispro SS  pre-lunch fs    As per primary team  Pt reports the following symptoms:  CONSTITUTIONAL:  Negative fever or chills, feels well, good appetite  CARDIOVASCULAR:  Negative for chest pain or palpitations  RESPIRATORY:  Negative for cough, wheezing, or SOB   GASTROINTESTINAL:  Negative for nausea, vomiting, diarrhea, constipation, or abdominal pain  NEUROLOGIC:  No headache, confusion, dizziness, lightheadedness    MEDICATIONS  (STANDING):  amLODIPine   Tablet 10 milliGRAM(s) Oral daily  atorvastatin 10 milliGRAM(s) Oral at bedtime  baclofen 2.5 milliGRAM(s) Oral every 12 hours  brimonidine 0.2% Ophthalmic Solution 1 Drop(s) Both EYES three times a day  chlorhexidine 2% Cloths 1 Application(s) Topical daily  dextrose 5%. 1000 milliLiter(s) (50 mL/Hr) IV Continuous <Continuous>  dextrose 50% Injectable 12.5 Gram(s) IV Push once  dextrose 50% Injectable 25 Gram(s) IV Push once  dextrose 50% Injectable 25 Gram(s) IV Push once  diphenoxylate/atropine 1 Tablet(s) Oral daily  dorzolamide 2% Ophthalmic Solution 1 Drop(s) Both EYES three times a day  gabapentin 300 milliGRAM(s) Oral every 8 hours  heparin   Injectable 7500 Unit(s) SubCutaneous every 8 hours  insulin glargine Injectable (LANTUS) 14 Unit(s) SubCutaneous at bedtime  insulin lispro (HumaLOG) corrective regimen sliding scale   SubCutaneous at bedtime  insulin lispro (HumaLOG) corrective regimen sliding scale   SubCutaneous three times a day before meals  insulin lispro Injectable (HumaLOG) 6 Unit(s) SubCutaneous three times a day before meals  pantoprazole    Tablet 40 milliGRAM(s) Oral every 12 hours  rifAXIMin 550 milliGRAM(s) Oral two times a day  sodium bicarbonate 650 milliGRAM(s) Oral three times a day  traZODone 100 milliGRAM(s) Oral at bedtime    MEDICATIONS  (PRN):  acetaminophen   Tablet .. 1000 milliGRAM(s) Oral every 12 hours PRN Mild Pain (1 - 3), Moderate Pain (4 - 6)  dextrose 40% Gel 15 Gram(s) Oral once PRN Blood Glucose LESS THAN 70 milliGRAM(s)/deciliter  glucagon  Injectable 1 milliGRAM(s) IntraMuscular once PRN Glucose LESS THAN 70 milligrams/deciliter  ondansetron    Tablet 4 milliGRAM(s) Oral every 8 hours PRN Nausea and/or Vomiting      PHYSICAL EXAM  Vital Signs Last 24 Hrs  T(C): 37 (07 May 2020 06:16), Max: 37 (07 May 2020 06:16)  T(F): 98.6 (07 May 2020 06:16), Max: 98.6 (07 May 2020 06:16)  HR: 76 (07 May 2020 06:16) (68 - 76)  BP: 164/74 (07 May 2020 06:16) (139/66 - 164/74)  BP(mean): --  RR: 18 (07 May 2020 06:16) (18 - 18)  SpO2: 97% (07 May 2020 06:16) (96% - 98%)    Due to the nature of this patient’s COVID-19 isolation status (either confirmed or suspected), this note was prepared without a bedside physical examination to prevent spread of infection and to conserve personal protective equipment during this nationwide pandemic. If possible, direct patient communication occurred via electronic measures or telephone conversation. Examination highlights were provided by a bedside nurse wearing personal protective equipment and review of pertinent medical records. Objective data (vital signs, urine output, lab results, imaging studies, medications, etc) were reviewed in detail. Face to face visits and physical examination have been limited only to patients for whom it is required for medical decision making.    LABS:                        8.0    7.62  )-----------( 308      ( 07 May 2020 06:31 )             24.5     05-07    136  |  101  |  32<H>  ----------------------------<  138<H>  4.5   |  21<L>  |  1.81<H>    Ca    9.2      07 May 2020 06:31  Phos  4.8       Mg     1.8         TPro  7.4  /  Alb  3.3  /  TBili  0.2  /  DBili  x   /  AST  17  /  ALT  20  /  AlkPhos  114          Thyroid Stimulating Hormone, Serum: 2.712 uIU/mL ( @ 06:31)  Thyroid Stimulating Hormone, Serum: 3.063 uIU/mL ( @ 06:31)  Thyroid Stimulating Hormone, Serum: 3.509 uIU/mL ( @ 06:07)  Thyroid Stimulating Hormone, Serum: 3.325 uIU/mL ( @ 06:17)  Thyroid Stimulating Hormone, Serum: 2.813 uIU/mL ( @ 07:28)  Thyroid Stimulating Hormone, Serum: 2.877 uIU/mL ( @ 00:34)      HbA1C: 6.9 % ( @ 06:59)  7.6 % ( @ 05:58)    CAPILLARY BLOOD GLUCOSE      POCT Blood Glucose.: 296 mg/dL (07 May 2020 09:19)  POCT Blood Glucose.: 158 mg/dL (07 May 2020 07:38)  POCT Blood Glucose.: 168 mg/dL (06 May 2020 21:35)  POCT Blood Glucose.: 214 mg/dL (06 May 2020 16:43)  POCT Blood Glucose.: 136 mg/dL (06 May 2020 11:33)      Insulin Sliding Scale requirements X 24 Hours:    RADIOLOGY & ADDITIONAL TESTS:    A/P 60yMale with hx of DM now with COVID, worsening renal function, moderate hyperglycemia.     1.  DM: type 2  Please continue Lantus 14 units at bedtime  PLease continue Lispro 6  units tid with meals.   Continue lispro moderate dose scale with meals and at bedtime.   Continue consistent carb diet  FSG Goal 100-180    2.  Hyperlipidemia - goal LDL < 70  - on lipitor 10 mg once daily    3.  Obesity - outpatient medical management.      4.  Hypothyroidism   - TSH on admission 2.8.   - levothyroxine 50mcg discontinued on  to assess necessity as there is no evidence of autoimmune hypothyroidism at this time.   - NO levothyroxine at this time.   - Repeat TSH 3.32 and free T4 1.03 ().   - TSH 3.5 and Free T4 1.01 on 5/3   - TSh was 2.712 and free T4 was 0.98 on     Case seen and discussed with  and updated the primary team.  Pt can follow up at discharge with [facility] by calling  to make an appointment.

## 2020-05-07 NOTE — PROGRESS NOTE ADULT - PROBLEM SELECTOR PLAN 3
Now s/p 3/31/20 ex lap, subtotal colectomy+ileostomy  course c/b fever and purulent wound infection (4/9), s/p vanc/zosyn.   -s/p laparotomy, no longer with wound vac, daily dressings  -ileostomy in place draining brown stool with no signs of bleeding  -lomotil- low dose to prevent constipation/ hepatic encephalopthy  -general surgery following, appreciate recs  -woundcare as below    #s/p wound vac, now removed,  dressing in place, C/D/I  -woundcare NP following and continue teaching patient   -wet to dry dressings daily, surgery following

## 2020-05-07 NOTE — PROGRESS NOTE ADULT - PROBLEM SELECTOR PLAN 4
Nonoliguric MEG likely 2/2 pre-renal + intrinsic 2/2 COVID-19 +/- contrast exposure +/- vancomycin toxicity  -Cr with continued downtrend  -avoid nephrotoxic agents  -gentle IV hydration if high GI output  -monitor I/Os   -renal following - appreciate recs    #Non-anion gap metabolic acidosis  -sodium bicarbonate TID

## 2020-05-07 NOTE — PROGRESS NOTE ADULT - PROBLEM SELECTOR PLAN 2
Decompensated cirrhosis c/b ascites likely 2/2 alcohol abuse  -SBP ruled out, hepatic encephalopathy present on admission now resolved  -CT abd/pelvis with persistent large ascites and peritonitis; bilateral pleural effusions and pericardial effusion  -s/p paracentesis on 4/10 with negative cytology but not sent for cell count/culture, however was on zosyn for 2 weeks so GI with no concerns for SBP   -s/p repeat paracentesis 4/19, but unable to aspirate significant amount of fluid   -SAAG 1.4 consistent with portal hypertension as etiology of ascites  -abd u/s 5/3 small ascites   -Rifaximin 550mg bid for hepatic encephalopathy   -holding Lactulose given high ostomy output  -pt refusing video capsule endoscopy this admission   -maximum Tylenol dosing 2g per day, will switch to 1 g BID per patient request   -screening for HCC outpatient with hepatology follow-up

## 2020-05-07 NOTE — CHART NOTE - NSCHARTNOTEFT_GEN_A_CORE
Admitting Diagnosis:   Patient is a 60y old  Male who presents with a chief complaint of toxic megacolon, COVID + (06 May 2020 10:01)      PAST MEDICAL & SURGICAL HISTORY:  Anemia  ETOH abuse  HLD (hyperlipidemia)  HTN (hypertension)  DM (diabetes mellitus)  No significant past surgical history      Current Nutrition Order:CCHO with no snack/Low fiber       PO Intake: Good (%) [  x ]  Fair (50-75%) [   ] Poor (<25%) [   ]    GI Issues: BM reported.End ileostomy/open-subtotal colectomy    Pain:none reported    Skin Integrity:wound infection at stoma/surgical incision    Labs:   05-07    136  |  101  |  32<H>  ----------------------------<  138<H>  4.5   |  21<L>  |  1.81<H>    Ca    9.2      07 May 2020 06:31  Phos  4.8     05-07  Mg     1.8     05-07    TPro  7.4  /  Alb  3.3  /  TBili  0.2  /  DBili  x   /  AST  17  /  ALT  20  /  AlkPhos  114  05-07    CAPILLARY BLOOD GLUCOSE      POCT Blood Glucose.: 168 mg/dL (06 May 2020 21:35)  POCT Blood Glucose.: 214 mg/dL (06 May 2020 16:43)  POCT Blood Glucose.: 136 mg/dL (06 May 2020 11:33)      Medications:  MEDICATIONS  (STANDING):  amLODIPine   Tablet 10 milliGRAM(s) Oral daily  atorvastatin 10 milliGRAM(s) Oral at bedtime  baclofen 2.5 milliGRAM(s) Oral every 12 hours  brimonidine 0.2% Ophthalmic Solution 1 Drop(s) Both EYES three times a day  chlorhexidine 2% Cloths 1 Application(s) Topical daily  dextrose 5%. 1000 milliLiter(s) (50 mL/Hr) IV Continuous <Continuous>  dextrose 50% Injectable 12.5 Gram(s) IV Push once  dextrose 50% Injectable 25 Gram(s) IV Push once  dextrose 50% Injectable 25 Gram(s) IV Push once  diphenoxylate/atropine 1 Tablet(s) Oral daily  dorzolamide 2% Ophthalmic Solution 1 Drop(s) Both EYES three times a day  gabapentin 300 milliGRAM(s) Oral every 8 hours  heparin   Injectable 7500 Unit(s) SubCutaneous every 8 hours  insulin glargine Injectable (LANTUS) 14 Unit(s) SubCutaneous at bedtime  insulin lispro (HumaLOG) corrective regimen sliding scale   SubCutaneous at bedtime  insulin lispro (HumaLOG) corrective regimen sliding scale   SubCutaneous three times a day before meals  insulin lispro Injectable (HumaLOG) 6 Unit(s) SubCutaneous three times a day before meals  magnesium sulfate  IVPB 1 Gram(s) IV Intermittent once  pantoprazole    Tablet 40 milliGRAM(s) Oral every 12 hours  rifAXIMin 550 milliGRAM(s) Oral two times a day  sodium bicarbonate 650 milliGRAM(s) Oral three times a day  traZODone 100 milliGRAM(s) Oral at bedtime    MEDICATIONS  (PRN):  acetaminophen   Tablet .. 1000 milliGRAM(s) Oral every 12 hours PRN Mild Pain (1 - 3), Moderate Pain (4 - 6)  dextrose 40% Gel 15 Gram(s) Oral once PRN Blood Glucose LESS THAN 70 milliGRAM(s)/deciliter  glucagon  Injectable 1 milliGRAM(s) IntraMuscular once PRN Glucose LESS THAN 70 milligrams/deciliter  ondansetron    Tablet 4 milliGRAM(s) Oral every 8 hours PRN Nausea and/or Vomiting      Weight:90.7kg  Daily     Daily     Weight Change: no updated weights    Estimated energy needs: Based on IBW due to above IBW.IBW:67.3kg q39-78zgjj:1682-2019kcal and 1.21-.4gmprotein(increased needs due to COVID-19 and surgical demands/stoma):81-94gmprotein and fluids per team    Subjective: 59y/o male with history of Toxic megacolon,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 .Patient eating 80% or better No N/V/D/C reported. Pending D/C to shelter.     Previous Nutrition Diagnosis: Increased nutrient needs r/t increased demand for protein and nutrients AEB: COVID-19+ and surgical demands     Active [ x]  Resolved [   ]    If resolved, new PES:     Goal :Meet 80% of needs consistently    Recommendations:1.Updated weights 2.Add 2 Glucerna shakes (440kcal and 20gmprotein)    Education: completed    Risk Level: High [   ] Moderate [  x ] Low [   ] Admitting Diagnosis:   Patient is a 60y old  Male who presents with a chief complaint of toxic megacolon, COVID + (06 May 2020 10:01)      PAST MEDICAL & SURGICAL HISTORY:  Anemia  ETOH abuse  HLD (hyperlipidemia)  HTN (hypertension)  DM (diabetes mellitus)  No significant past surgical history      Current Nutrition Order:[facility]HO with no snack/Low fiber       PO Intake: Good (%) [  x ]  Fair (50-75%) [   ] Poor (<25%) [   ]    GI Issues: BM reported.End ileostomy/open-subtotal colectomy    Pain:none reported    Skin Integrity:wound infection at stoma/surgical incision    Labs:   05-07    136  |  101  |  32<H>  ----------------------------<  138<H>  4.5   |  21<L>  |  1.81<H>    Ca    9.2      07 May 2020 06:31  Phos  4.8     05-07  Mg     1.8     05-07    TPro  7.4  /  Alb  3.3  /  TBili  0.2  /  DBili  x   /  AST  17  /  ALT  20  /  AlkPhos  114  05-07    CAPILLARY BLOOD GLUCOSE      POCT Blood Glucose.: 168 mg/dL (06 May 2020 21:35)  POCT Blood Glucose.: 214 mg/dL (06 May 2020 16:43)  POCT Blood Glucose.: 136 mg/dL (06 May 2020 11:33)      Medications:  MEDICATIONS  (STANDING):  amLODIPine   Tablet 10 milliGRAM(s) Oral daily  atorvastatin 10 milliGRAM(s) Oral at bedtime  baclofen 2.5 milliGRAM(s) Oral every 12 hours  brimonidine 0.2% Ophthalmic Solution 1 Drop(s) Both EYES three times a day  chlorhexidine 2% Cloths 1 Application(s) Topical daily  dextrose 5%. 1000 milliLiter(s) (50 mL/Hr) IV Continuous <Continuous>  dextrose 50% Injectable 12.5 Gram(s) IV Push once  dextrose 50% Injectable 25 Gram(s) IV Push once  dextrose 50% Injectable 25 Gram(s) IV Push once  diphenoxylate/atropine 1 Tablet(s) Oral daily  dorzolamide 2% Ophthalmic Solution 1 Drop(s) Both EYES three times a day  gabapentin 300 milliGRAM(s) Oral every 8 hours  heparin   Injectable 7500 Unit(s) SubCutaneous every 8 hours  insulin glargine Injectable (LANTUS) 14 Unit(s) SubCutaneous at bedtime  insulin lispro (HumaLOG) corrective regimen sliding scale   SubCutaneous at bedtime  insulin lispro (HumaLOG) corrective regimen sliding scale   SubCutaneous three times a day before meals  insulin lispro Injectable (HumaLOG) 6 Unit(s) SubCutaneous three times a day before meals  magnesium sulfate  IVPB 1 Gram(s) IV Intermittent once  pantoprazole    Tablet 40 milliGRAM(s) Oral every 12 hours  rifAXIMin 550 milliGRAM(s) Oral two times a day  sodium bicarbonate 650 milliGRAM(s) Oral three times a day  traZODone 100 milliGRAM(s) Oral at bedtime    MEDICATIONS  (PRN):  acetaminophen   Tablet .. 1000 milliGRAM(s) Oral every 12 hours PRN Mild Pain (1 - 3), Moderate Pain (4 - 6)  dextrose 40% Gel 15 Gram(s) Oral once PRN Blood Glucose LESS THAN 70 milliGRAM(s)/deciliter  glucagon  Injectable 1 milliGRAM(s) IntraMuscular once PRN Glucose LESS THAN 70 milligrams/deciliter  ondansetron    Tablet 4 milliGRAM(s) Oral every 8 hours PRN Nausea and/or Vomiting      Weight:90.7kg  Daily     Daily     Weight Change: no updated weights    Estimated energy needs: Based on IBW due to above IBW.IBW:67.3kg u53-32yvmh:1682-2019kcal and 1.21-.4gmprotein(increased needs due to COVID-19 and surgical demands/stoma):81-94gmprotein and fluids per team    Subjective: 59y/o male with history of Toxic megacolon,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 Pending D/C to shelter.     Previous Nutrition Diagnosis: Increased nutrient needs r/t increased demand for protein and nutrients AEB: COVID-19+ and surgical demands     Active [ x]  Resolved [   ]    If resolved, new PES:     Goal :Meet 80% of needs consistently    Recommendations:1.Updated weights 2.Add 2 Glucerna shakes (440kcal and 20gmprotein)    Education: completed    Risk Level: High [   ] Moderate [  x ] Low [   ]

## 2020-05-07 NOTE — PROGRESS NOTE ADULT - PROBLEM SELECTOR PLAN 5
Normocytic likely 2/2 chronic disease + kidney dysfunction vs less likely acute blood loss  -Hgb downtrended to 6.6 on 5/5, s/p unit PRBC on 5/5, H/H now stable at 8.3/25.7  -monitor hb, transfuse <7  -s/p 7U pRBCs total this admission as of 5/5/20  -pantoprazole 40mg BID   -monitor for signs of bleeding   -GI no longer following, states no longer any need for capsule endoscopy - patient refused  -f/u hemolysis labs

## 2020-05-07 NOTE — PROGRESS NOTE ADULT - ATTENDING COMMENTS
24Hr Events: None  C/o neuropathic pain, ostomy device not being changed frequently enough, not having anyone to talk to  Afeb, 97% on RA, ostomy site well-cleaned, wound drsg intact  Hb 8 (stable)  Cr 1.8 (down-trending)  60M PMH HTN, DM + neuropathy, IVDU, ETOH liver cirrhosis, [facility]; found with toxic megacolon and pneumatosis; now s/p ex lap + subtotal colectomy, ileostomy (3/31). Developed COVID 19 while in the hospital, but subsequent test was positive, patient was transferred to [facility] tele COVID unit without need for supplemental oxygen. Now on RA, on wet to dry dressing, walked with PT, MEG improving, H/H low but stable s/p multiple units of PRBC, following up heme work up to determine if there is a source of bleed vs a hematologic cause for anemia; Pt stable for dispo to [facility] when set up with SW when he is able to take care of his wounds. Pt currently needs daily wound/ostomy checks. Shelter [facility] only has 1 x a week wound check available   Ostomy functioning, f/u surgery re: Lomotil  Wound care per RN, if can be reduced to biw may be able to be d/c'd to [facility]  Renal fnxn improving, f/u renal recs  DM regimen per endo  Continue pain regimen  Awaiting placement

## 2020-05-07 NOTE — PROGRESS NOTE ADULT - ASSESSMENT
60M PMH HTN, DM + neuropathy, IVDU, ETOH liver cirrhosis, [facility]; found with toxic megacolon and pneumatosis; now s/p ex lap + subtotal colectomy, ileostomy (3/31). Developed COVID 19 while in the hospital, but subsequent test was positive, patient was transferred to [facility] tele COVID unit without need for supplemental oxygen. Now on RA, on wet to dry dressing, walked with PT, MEG improving, H/H low but stable s/p multiple units of PRBC, following up heme work up to determine if there is a source of bleed vs a hematologic cause for anemia; Pt stable for dispo to care home when set up with  when he is able to take care of his wounds. Pt currently needs daily wound/ostomy checks. Shel[facility] only has 1 x a week wound check available

## 2020-05-08 LAB
GLUCOSE BLDC GLUCOMTR-MCNC: 126 MG/DL — HIGH (ref 70–99)
GLUCOSE BLDC GLUCOMTR-MCNC: 143 MG/DL — HIGH (ref 70–99)
GLUCOSE BLDC GLUCOMTR-MCNC: 184 MG/DL — HIGH (ref 70–99)
GLUCOSE BLDC GLUCOMTR-MCNC: 225 MG/DL — HIGH (ref 70–99)

## 2020-05-08 PROCEDURE — 99231 SBSQ HOSP IP/OBS SF/LOW 25: CPT | Mod: GC

## 2020-05-08 PROCEDURE — 99232 SBSQ HOSP IP/OBS MODERATE 35: CPT

## 2020-05-08 PROCEDURE — 99239 HOSP IP/OBS DSCHRG MGMT >30: CPT

## 2020-05-08 RX ORDER — LISINOPRIL/HYDROCHLOROTHIAZIDE 10-12.5 MG
1 TABLET ORAL
Qty: 0 | Refills: 0 | DISCHARGE

## 2020-05-08 RX ORDER — INSULIN DETEMIR 100/ML (3)
29 INSULIN PEN (ML) SUBCUTANEOUS
Qty: 0 | Refills: 0 | DISCHARGE

## 2020-05-08 RX ORDER — ACETAMINOPHEN 500 MG
500 TABLET ORAL EVERY 6 HOURS
Refills: 0 | Status: DISCONTINUED | OUTPATIENT
Start: 2020-05-08 | End: 2020-06-05

## 2020-05-08 RX ORDER — INSULIN ASPART 100 [IU]/ML
10 INJECTION, SOLUTION SUBCUTANEOUS
Qty: 0 | Refills: 0 | DISCHARGE

## 2020-05-08 RX ORDER — ONDANSETRON 8 MG/1
1 TABLET, FILM COATED ORAL
Qty: 0 | Refills: 0 | DISCHARGE
Start: 2020-05-08

## 2020-05-08 RX ORDER — GABAPENTIN 400 MG/1
1200 CAPSULE ORAL
Qty: 0 | Refills: 0 | DISCHARGE

## 2020-05-08 RX ORDER — LEVOTHYROXINE SODIUM 125 MCG
1 TABLET ORAL
Qty: 0 | Refills: 0 | DISCHARGE

## 2020-05-08 RX ORDER — INSULIN ASPART 100 [IU]/ML
22 INJECTION, SOLUTION SUBCUTANEOUS
Qty: 0 | Refills: 0 | DISCHARGE

## 2020-05-08 RX ORDER — ACETAMINOPHEN 500 MG
2 TABLET ORAL
Qty: 0 | Refills: 0 | DISCHARGE
Start: 2020-05-08

## 2020-05-08 RX ADMIN — Medication 2: at 21:43

## 2020-05-08 RX ADMIN — Medication 650 MILLIGRAM(S): at 12:27

## 2020-05-08 RX ADMIN — CHLORHEXIDINE GLUCONATE 1 APPLICATION(S): 213 SOLUTION TOPICAL at 12:27

## 2020-05-08 RX ADMIN — ATORVASTATIN CALCIUM 10 MILLIGRAM(S): 80 TABLET, FILM COATED ORAL at 21:27

## 2020-05-08 RX ADMIN — BRIMONIDINE TARTRATE 1 DROP(S): 2 SOLUTION/ DROPS OPHTHALMIC at 00:31

## 2020-05-08 RX ADMIN — Medication 1 TABLET(S): at 12:26

## 2020-05-08 RX ADMIN — BRIMONIDINE TARTRATE 1 DROP(S): 2 SOLUTION/ DROPS OPHTHALMIC at 12:28

## 2020-05-08 RX ADMIN — Medication 6 UNIT(S): at 17:39

## 2020-05-08 RX ADMIN — Medication 2: at 11:59

## 2020-05-08 RX ADMIN — GABAPENTIN 300 MILLIGRAM(S): 400 CAPSULE ORAL at 05:48

## 2020-05-08 RX ADMIN — HEPARIN SODIUM 7500 UNIT(S): 5000 INJECTION INTRAVENOUS; SUBCUTANEOUS at 21:27

## 2020-05-08 RX ADMIN — Medication 650 MILLIGRAM(S): at 05:48

## 2020-05-08 RX ADMIN — Medication 6 UNIT(S): at 08:38

## 2020-05-08 RX ADMIN — PANTOPRAZOLE SODIUM 40 MILLIGRAM(S): 20 TABLET, DELAYED RELEASE ORAL at 05:48

## 2020-05-08 RX ADMIN — DORZOLAMIDE HYDROCHLORIDE 1 DROP(S): 20 SOLUTION/ DROPS OPHTHALMIC at 12:28

## 2020-05-08 RX ADMIN — HEPARIN SODIUM 7500 UNIT(S): 5000 INJECTION INTRAVENOUS; SUBCUTANEOUS at 12:27

## 2020-05-08 RX ADMIN — HEPARIN SODIUM 7500 UNIT(S): 5000 INJECTION INTRAVENOUS; SUBCUTANEOUS at 05:51

## 2020-05-08 RX ADMIN — DORZOLAMIDE HYDROCHLORIDE 1 DROP(S): 20 SOLUTION/ DROPS OPHTHALMIC at 06:00

## 2020-05-08 RX ADMIN — Medication 2.5 MILLIGRAM(S): at 17:01

## 2020-05-08 RX ADMIN — GABAPENTIN 300 MILLIGRAM(S): 400 CAPSULE ORAL at 21:26

## 2020-05-08 RX ADMIN — AMLODIPINE BESYLATE 10 MILLIGRAM(S): 2.5 TABLET ORAL at 05:48

## 2020-05-08 RX ADMIN — GABAPENTIN 300 MILLIGRAM(S): 400 CAPSULE ORAL at 12:27

## 2020-05-08 RX ADMIN — Medication 650 MILLIGRAM(S): at 21:26

## 2020-05-08 RX ADMIN — BRIMONIDINE TARTRATE 1 DROP(S): 2 SOLUTION/ DROPS OPHTHALMIC at 06:00

## 2020-05-08 RX ADMIN — Medication 500 MILLIGRAM(S): at 09:54

## 2020-05-08 RX ADMIN — BRIMONIDINE TARTRATE 1 DROP(S): 2 SOLUTION/ DROPS OPHTHALMIC at 21:28

## 2020-05-08 RX ADMIN — INSULIN GLARGINE 14 UNIT(S): 100 INJECTION, SOLUTION SUBCUTANEOUS at 21:44

## 2020-05-08 RX ADMIN — PANTOPRAZOLE SODIUM 40 MILLIGRAM(S): 20 TABLET, DELAYED RELEASE ORAL at 17:01

## 2020-05-08 RX ADMIN — Medication 2.5 MILLIGRAM(S): at 05:48

## 2020-05-08 RX ADMIN — Medication 6 UNIT(S): at 12:00

## 2020-05-08 RX ADMIN — DORZOLAMIDE HYDROCHLORIDE 1 DROP(S): 20 SOLUTION/ DROPS OPHTHALMIC at 21:28

## 2020-05-08 RX ADMIN — DORZOLAMIDE HYDROCHLORIDE 1 DROP(S): 20 SOLUTION/ DROPS OPHTHALMIC at 00:31

## 2020-05-08 RX ADMIN — Medication 100 MILLIGRAM(S): at 21:26

## 2020-05-08 NOTE — PROGRESS NOTE ADULT - ASSESSMENT
60M PMH HTN, DM + neuropathy, IVDU, ETOH liver cirrhosis, presents to [facility] s/p fall at home; found with toxic megacolon and pneumatosis; now s/p ex lap + subtotal colectomy, ileostomy (3/31). Hospital course further complicated by COVID, MEG, anemia (refused further GI workeup). Pt stable for discharge pending instatement of proper woundcare services.

## 2020-05-08 NOTE — PROGRESS NOTE ADULT - ATTENDING COMMENTS
24Hr Events: None  C/o diffuse pain, frustration over social and medical situation otherwise ROS neg  Afeb, 100% on RA, exam unchanged  FS at goal  60M PMH HTN, DM + neuropathy, IVDU, ETOH liver cirrhosis, presents to [facility] s/p fall at home; found with toxic megacolon and pneumatosis; now s/p ex lap + subtotal colectomy, ileostomy (3/31). Hospital course further complicated by COVID, MEG, anemia (refused further GI workup). Pt stable for discharge pending instatement of proper woundcare services.  COVID resolved  FS at goal  Euthyroid, no need for Synthroid  Wound and ostomy care per RN  Adjust pain meds based on renal fnxn  Continue Lomotil  Continue bicarb  D/c to [facility]

## 2020-05-08 NOTE — ADVANCED PRACTICE NURSE CONSULT - RECOMMEDATIONS
Ostomy care 2x/wk and prn: Place stoma ring around stoma prior to placing appliance    Wound care 2x/wk and prn: Cleanse wounds with NS, pat dry. Moisten 2x2s and lightly pack into open sites within inferior wound, apply thick layer of Triad ointment/cream to remainder of wound bed. Apply thick layer of Triad to inferior wound, loosely pack with 4x4 then cover both wounds with ABD pad, tape to secure.

## 2020-05-08 NOTE — PROGRESS NOTE ADULT - SUBJECTIVE AND OBJECTIVE BOX
INTERVAL HPI/OVERNIGHT EVENTS:      Patient is a 60y old  Male who presents with a chief complaint of toxic megacolon, COVID + (06 May 2020 10:01)  Spoke to patient via phone and the primary team taking care of the patient.  No acute events  Saturating well on RA. appetite is good.  TSh was 2.712 and free T4 was 0.98 on   FSg and insulin administration reviewed    Pt reports the following symptoms:  CONSTITUTIONAL:  Negative fever or chills, feels well, good appetite  CARDIOVASCULAR:  Negative for chest pain or palpitations  RESPIRATORY:  Negative for cough, wheezing, or SOB   GASTROINTESTINAL:  Negative for nausea, vomiting, diarrhea, constipation, or abdominal pain  NEUROLOGIC:  No headache, confusion, dizziness, lightheadedness  MEDICATIONS  (STANDING):  amLODIPine   Tablet 10 milliGRAM(s) Oral daily  atorvastatin 10 milliGRAM(s) Oral at bedtime  baclofen 2.5 milliGRAM(s) Oral every 12 hours  brimonidine 0.2% Ophthalmic Solution 1 Drop(s) Both EYES three times a day  chlorhexidine 2% Cloths 1 Application(s) Topical daily  dextrose 5%. 1000 milliLiter(s) (50 mL/Hr) IV Continuous <Continuous>  dextrose 50% Injectable 12.5 Gram(s) IV Push once  dextrose 50% Injectable 25 Gram(s) IV Push once  dextrose 50% Injectable 25 Gram(s) IV Push once  diphenoxylate/atropine 1 Tablet(s) Oral daily  dorzolamide 2% Ophthalmic Solution 1 Drop(s) Both EYES three times a day  gabapentin 300 milliGRAM(s) Oral every 8 hours  heparin   Injectable 7500 Unit(s) SubCutaneous every 8 hours  insulin glargine Injectable (LANTUS) 14 Unit(s) SubCutaneous at bedtime  insulin lispro (HumaLOG) corrective regimen sliding scale   SubCutaneous at bedtime  insulin lispro (HumaLOG) corrective regimen sliding scale   SubCutaneous three times a day before meals  insulin lispro Injectable (HumaLOG) 6 Unit(s) SubCutaneous three times a day before meals  pantoprazole    Tablet 40 milliGRAM(s) Oral every 12 hours  rifAXIMin 550 milliGRAM(s) Oral two times a day  sodium bicarbonate 650 milliGRAM(s) Oral three times a day  traZODone 100 milliGRAM(s) Oral at bedtime    MEDICATIONS  (PRN):  acetaminophen   Tablet .. 500 milliGRAM(s) Oral every 6 hours PRN Mild Pain (1 - 3), Moderate Pain (4 - 6)  dextrose 40% Gel 15 Gram(s) Oral once PRN Blood Glucose LESS THAN 70 milliGRAM(s)/deciliter  glucagon  Injectable 1 milliGRAM(s) IntraMuscular once PRN Glucose LESS THAN 70 milligrams/deciliter  ondansetron    Tablet 4 milliGRAM(s) Oral every 8 hours PRN Nausea and/or Vomiting      PHYSICAL EXAM  Vital Signs Last 24 Hrs  T(C): 36.4 (08 May 2020 13:15), Max: 36.8 (07 May 2020 22:00)  T(F): 97.6 (08 May 2020 13:15), Max: 98.3 (08 May 2020 05:06)  HR: 73 (08 May 2020 13:15) (67 - 83)  BP: 155/63 (08 May 2020 13:15) (155/63 - 161/61)  BP(mean): --  RR: 18 (08 May 2020 13:15) (18 - 20)  SpO2: 99% (08 May 2020 13:15) (97% - 100%)    Due to the nature of this patient’s COVID-19 isolation status (either confirmed or suspected), this note was prepared without a bedside physical examination to prevent spread of infection and to conserve personal protective equipment during this nationwide pandemic. If possible, direct patient communication occurred via electronic measures or telephone conversation. Examination highlights were provided by a bedside nurse wearing personal protective equipment and review of pertinent medical records. Objective data (vital signs, urine output, lab results, imaging studies, medications, etc) were reviewed in detail. Face to face visits and physical examination have been limited only to patients for whom it is required for medical decision making.    LABS:                        8.0    7.62  )-----------( 308      ( 07 May 2020 06:31 )             24.5     05-07    136  |  101  |  32<H>  ----------------------------<  138<H>  4.5   |  21<L>  |  1.81<H>    Ca    9.2      07 May 2020 06:31  Phos  4.8     05-07  Mg     1.8     05-07    TPro  7.4  /  Alb  3.3  /  TBili  0.2  /  DBili  x   /  AST  17  /  ALT  20  /  AlkPhos  114        Urinalysis Basic - ( 07 May 2020 11:55 )    Color: Yellow / Appearance: Clear / S.010 / pH: x  Gluc: x / Ketone: NEGATIVE  / Bili: Negative / Urobili: 0.2 E.U./dL   Blood: x / Protein: Trace mg/dL / Nitrite: NEGATIVE   Leuk Esterase: NEGATIVE / RBC: < 5 /HPF / WBC < 5 /HPF   Sq Epi: x / Non Sq Epi: 0-5 /HPF / Bacteria: Present /HPF      Thyroid Stimulating Hormone, Serum: 2.712 uIU/mL ( @ 06:31)  Thyroid Stimulating Hormone, Serum: 3.063 uIU/mL ( @ 06:31)  Thyroid Stimulating Hormone, Serum: 3.509 uIU/mL ( @ 06:07)  Thyroid Stimulating Hormone, Serum: 3.325 uIU/mL ( @ 06:17)  Thyroid Stimulating Hormone, Serum: 2.813 uIU/mL ( @ 07:28)  Thyroid Stimulating Hormone, Serum: 2.877 uIU/mL ( @ 00:34)      HbA1C: 6.9 % ( @ 06:59)  7.6 % ( @ 05:58)    CAPILLARY BLOOD GLUCOSE      POCT Blood Glucose.: 184 mg/dL (08 May 2020 11:42)  POCT Blood Glucose.: 143 mg/dL (08 May 2020 08:17)  POCT Blood Glucose.: 163 mg/dL (07 May 2020 22:01)  POCT Blood Glucose.: 162 mg/dL (07 May 2020 17:02)    A/P 60yMale with hx of DM now with COVID, worsening renal function, moderate hyperglycemia.     1.  DM: type 2  Please continue Lantus 14 units at bedtime  PLease continue Lispro 6  units tid with meals.   Continue lispro moderate dose scale with meals and at bedtime.   Continue consistent carb diet  FSG Goal 100-180  GFR 40 and EF 65%    2.  Hyperlipidemia - goal LDL < 70  - on lipitor 10 mg once daily    3.  Obesity - outpatient medical management.      4.  Hypothyroidism   - TSH on admission 2.8.   - levothyroxine 50mcg discontinued on  to assess necessity as there is no evidence of autoimmune hypothyroidism at this time.   - NO levothyroxine at this time.   - Repeat TSH 3.32 and free T4 1.03 ().   - TSH 3.5 and Free T4 1.01 on 5/3   - TSh was 2.712 and free T4 was 0.98 on     For discharge, metformin 1000mg BID and glimepiride 1mg before breakfast.    Case seen and discussed with  and updated the primary team.  Pt can follow up at discharge with [facility] by calling  to make an appointment.

## 2020-05-08 NOTE — PROGRESS NOTE ADULT - ASSESSMENT
59 y/o M with PMH of HTN, DM complicated by neuropathy, IVDU, ETOH liver cirrhosis, who presents to [facility] for fall at home; found to have toxic megacolon and pneumatosis; underwent ex lap and subtotal colectomy, and ileostomy on 3/31. COVID +       # Non-oliguric MEG, likely perfusional ATN  - FeNa cw intrinsic cause, improving renal fx over last days   - keep euvolemic   - monitor BUN/Cr, lytes, uop   - on sodium bicarbonate 650 mg po q8hr, would recommend to discontinue upon discharge  - follow up w PCP in 1 week for BMP, if renal fx is worsening follow w Nephrologist    # Hypertension  - on amlodipine 10 mg po qd  - low salt diet     # Anemia  - monitor H/H, transfuse as indicated

## 2020-05-08 NOTE — PROGRESS NOTE ADULT - PROBLEM SELECTOR PLAN 7
Type 2 DM, endocrinology following   -lantus 14U qhs  -lispro 6U TID pre-meal   -mISS  -f/u discharge regimen per endo    #hypothyroidism   -TSH 2.813; TPO and antithyroid Abs neg   -levothyroxine 50mcg discontinued on 4/29 to assess necessity as there is no evidence of autoimmune hypothyroidism at this time

## 2020-05-08 NOTE — ADVANCED PRACTICE NURSE CONSULT - ASSESSMENT
New 1 piece Royal appliance placed over stoma, which is now becoming more flush to abdomen, which is distended. Remains pink and functioning, slight peristomal erythema noted. Reviewed with patient the need to empty pouch multiple times during the day to keep it from overfilling and becoming detached. 3 boxes of ostomy supplies packed for patient to take upon discharge: 1 3/4" 2 piece appliances, 2 3/4" 2 piece appliances, and 1 piece appliances. Pt also has stoma rings for placement around stoma prior to placing appliance, skin barrier wipes for skin protection, stoma powder in case of skin irritation. Pt unable at this time to perform ostomy care but is knowledgeable of emptying.     Superior wound to abdomen measures 10x2 cm, 80% granulation of wound bed, 2 sites remain open in wound with fibrinous exudate visible, minimal drainage. Inferior wound measures 8x3x1.5 cm with 100% granulation. Openings in superior wound lightly packed with moist 2x2s then Triad ointment applied to wound bed. Also placed thick layer of Triad ointment into inferior wound, both covered with ABD pad and taped to secure. All of these supplies packed for patient's discharge.

## 2020-05-08 NOTE — PROGRESS NOTE ADULT - PROBLEM SELECTOR PLAN 4
nonoliguric MEG likely 2/2 pre-renal + intrinsic 2/2 COVID-19 +/- contrast exposure +/- vancomycin toxicity  -Cr with continued downtrend  -avoid nephrotoxic agents  -gentle IV hydration if high GI output  -monitor I/Os   -renal following - appreciate recs    #Non-anion gap metabolic acidosis  -sodium bicarbonate TID  -f/u further nephrology recs

## 2020-05-08 NOTE — PROGRESS NOTE ADULT - ATTENDING COMMENTS
Glucoses have been stable in the 140-180 range, and would continue the current insulin regimen while he remains in the hospital.  Plan post discharge is still to change to an oral regimen of metformin and glimepiride

## 2020-05-08 NOTE — PROGRESS NOTE ADULT - SUBJECTIVE AND OBJECTIVE BOX
OVERNIGHT EVENTS: No acute events overnight.  SUBJECTIVE: Patient seen and examined at the bedside. Patient denies new complaints at this time.      Vital Signs Last 12 Hrs  T(F): 98.3 (20 @ 05:06), Max: 98.3 (20 @ 05:06)  HR: 83 (20 @ 05:06) (83 - 83)  BP: 157/104 (20 @ 05:06) (157/104 - 157/104)  BP(mean): --  RR: 20 (20 @ 05:06) (20 - 20)  SpO2: 100% (20 @ 05:06) (100% - 100%)    I&O's Summary    07 May 2020 07:01  -  08 May 2020 07:00  --------------------------------------------------------  IN: 0 mL / OUT: 450 mL / NET: -450 mL    PHYSICAL EXAM:  General: NAD, resting comfortably in bed  HEENT: NCAT, EOMI, MMM  Neck: supple  Respiratory: CTA, normal respiratory rate/depth/effort  Cardiovascular: normal S1/S2, RRR, no MRG  Vascular: 2+ radial/DP pulses b/l  Abdomen: NT, soft, distended, ileostomy output nonbloody; midline wound dressing c/d/i  Extremities: WWP, no edema  Neuro: AAOx3    LABS:                        8.0    7.62  )-----------( 308      ( 07 May 2020 06:31 )             24.5     05-07    136  |  101  |  32<H>  ----------------------------<  138<H>  4.5   |  21<L>  |  1.81<H>    Ca    9.2      07 May 2020 06:31  Phos  4.8     05-07  Mg     1.8     -    TPro  7.4  /  Alb  3.3  /  TBili  0.2  /  DBili  x   /  AST  17  /  ALT  20  /  AlkPhos  114  05-07      Urinalysis Basic - ( 07 May 2020 11:55 )  Color: Yellow / Appearance: Clear / S.010 / pH: x  Gluc: x / Ketone: NEGATIVE  / Bili: Negative / Urobili: 0.2 E.U./dL   Blood: x / Protein: Trace mg/dL / Nitrite: NEGATIVE   Leuk Esterase: NEGATIVE / RBC: < 5 /HPF / WBC < 5 /HPF   Sq Epi: x / Non Sq Epi: 0-5 /HPF / Bacteria: Present /HPF      RADIOLOGY & ADDITIONAL TESTS: Reviewed    MEDICATIONS  (STANDING):  amLODIPine   Tablet 10 milliGRAM(s) Oral daily  atorvastatin 10 milliGRAM(s) Oral at bedtime  baclofen 2.5 milliGRAM(s) Oral every 12 hours  brimonidine 0.2% Ophthalmic Solution 1 Drop(s) Both EYES three times a day  chlorhexidine 2% Cloths 1 Application(s) Topical daily  dextrose 5%. 1000 milliLiter(s) (50 mL/Hr) IV Continuous <Continuous>  dextrose 50% Injectable 12.5 Gram(s) IV Push once  dextrose 50% Injectable 25 Gram(s) IV Push once  dextrose 50% Injectable 25 Gram(s) IV Push once  diphenoxylate/atropine 1 Tablet(s) Oral daily  dorzolamide 2% Ophthalmic Solution 1 Drop(s) Both EYES three times a day  gabapentin 300 milliGRAM(s) Oral every 8 hours  heparin   Injectable 7500 Unit(s) SubCutaneous every 8 hours  insulin glargine Injectable (LANTUS) 14 Unit(s) SubCutaneous at bedtime  insulin lispro (HumaLOG) corrective regimen sliding scale   SubCutaneous at bedtime  insulin lispro (HumaLOG) corrective regimen sliding scale   SubCutaneous three times a day before meals  insulin lispro Injectable (HumaLOG) 6 Unit(s) SubCutaneous three times a day before meals  pantoprazole    Tablet 40 milliGRAM(s) Oral every 12 hours  rifAXIMin 550 milliGRAM(s) Oral two times a day  sodium bicarbonate 650 milliGRAM(s) Oral three times a day  traZODone 100 milliGRAM(s) Oral at bedtime    MEDICATIONS  (PRN):  acetaminophen   Tablet .. 500 milliGRAM(s) Oral every 6 hours PRN Mild Pain (1 - 3), Moderate Pain (4 - 6)  dextrose 40% Gel 15 Gram(s) Oral once PRN Blood Glucose LESS THAN 70 milliGRAM(s)/deciliter  glucagon  Injectable 1 milliGRAM(s) IntraMuscular once PRN Glucose LESS THAN 70 milligrams/deciliter  ondansetron    Tablet 4 milliGRAM(s) Oral every 8 hours PRN Nausea and/or Vomiting

## 2020-05-08 NOTE — PROGRESS NOTE ADULT - PROBLEM SELECTOR PLAN 10
F: none  E: replete prn, s/p Mag 2mg IV x1 for magnesium level 1.5  N: low fiber, consistent carbs  GI ppx: protonix BID   DVT ppx: Sub Q heparin, DVT neg by ultrasound    DISPO pending establishment of proper woundcare upon discharge

## 2020-05-08 NOTE — PROGRESS NOTE ADULT - PROBLEM SELECTOR PLAN 1
4/12 COVID+   -s/p hydroxychloroquine + azithromycin 4/13-4/16  -stable respiratory status on room air, continue to monitor respiratory status  -continue supportive care  -isolation precautions

## 2020-05-08 NOTE — PROGRESS NOTE ADULT - PROBLEM SELECTOR PLAN 3
now s/p 3/31/20 ex lap, subtotal colectomy+ileostomy  course c/b fever and purulent wound infection (4/9), s/p vanc/zosyn.   -s/p laparotomy, no longer with wound vac, daily dressings  -ileostomy in place draining brown stool with no signs of bleeding  -lomotil- low dose to prevent constipation/ hepatic encephalopthy  -general surgery following, appreciate recs  -woundcare

## 2020-05-08 NOTE — PROGRESS NOTE ADULT - SUBJECTIVE AND OBJECTIVE BOX
no acute events overnight   improving renal fx over last days   pending am labs       Meds:  acetaminophen   Tablet .. 500 every 6 hours PRN  amLODIPine   Tablet 10 daily  atorvastatin 10 at bedtime  baclofen 2.5 every 12 hours  brimonidine 0.2% Ophthalmic Solution 1 three times a day  chlorhexidine 2% Cloths 1 daily  dextrose 40% Gel 15 once PRN  dextrose 5%. 1000 <Continuous>  dextrose 50% Injectable 12.5 once  dextrose 50% Injectable 25 once  dextrose 50% Injectable 25 once  diphenoxylate/atropine 1 daily  dorzolamide 2% Ophthalmic Solution 1 three times a day  gabapentin 300 every 8 hours  glucagon  Injectable 1 once PRN  heparin   Injectable 7500 every 8 hours  insulin glargine Injectable (LANTUS) 14 at bedtime  insulin lispro (HumaLOG) corrective regimen sliding scale  at bedtime  insulin lispro (HumaLOG) corrective regimen sliding scale  three times a day before meals  insulin lispro Injectable (HumaLOG) 6 three times a day before meals  ondansetron    Tablet 4 every 8 hours PRN  pantoprazole    Tablet 40 every 12 hours  rifAXIMin 550 two times a day  sodium bicarbonate 650 three times a day  traZODone 100 at bedtime      T(C): , Max: 36.8 (20 @ 22:00)  T(F): , Max: 98.3 (20 @ 05:06)  HR: 73 (20 @ 13:15)  BP: 155/63 (20 @ 13:15)  BP(mean): --  RR: 18 (20 @ 13:15)  SpO2: 99% (20 @ 13:15)  Wt(kg): --     @ 07:01  -   @ 07:00  --------------------------------------------------------  IN: 0 mL / OUT: 450 mL / NET: -450 mL     @ 07:01  -   @ 14:45  --------------------------------------------------------  IN: 0 mL / OUT: 800 mL / NET: -800 mL    PHYSICAL EXAM:  General: alert, NAD  Neck: supple  Respiratory: CTA bl  Cardiovascular: normal S1/S2, RRR, no MRG  Abdomen: NT, soft, distended, ileostomy output nonbloody; midline wound dressing c/d/i  Extremities: no edema  Neuro: AAO x3        LABS:                        8.0    7.62  )-----------( 308      ( 07 May 2020 06:31 )             24.5     -    136  |  101  |  32<H>  ----------------------------<  138<H>  4.5   |  21<L>  |  1.81<H>    Ca    9.2      07 May 2020 06:31  Phos  4.8       Mg     1.8         TPro  7.4  /  Alb  3.3  /  TBili  0.2  /  DBili  x   /  AST  17  /  ALT  20  /  AlkPhos  114          Urinalysis Basic - ( 07 May 2020 11:55 )    Color: Yellow / Appearance: Clear / S.010 / pH: x  Gluc: x / Ketone: NEGATIVE  / Bili: Negative / Urobili: 0.2 E.U./dL   Blood: x / Protein: Trace mg/dL / Nitrite: NEGATIVE   Leuk Esterase: NEGATIVE / RBC: < 5 /HPF / WBC < 5 /HPF   Sq Epi: x / Non Sq Epi: 0-5 /HPF / Bacteria: Present /HPF      Creatinine, Random Urine: 25 mg/dL ( @ 11:54)  Sodium, Random Urine: 52 mmol/L ( @ 11:54)        RADIOLOGY & ADDITIONAL STUDIES:    reviewed

## 2020-05-08 NOTE — PROGRESS NOTE ADULT - SUBJECTIVE AND OBJECTIVE BOX
General Surgery Consult Note    SUBJECTIVE:  Patient seen and examined at bedside. In no acute distress. Denies N/V. Is tolerating diet, having BM via ostomy. Pain well controlled.     Vital Signs Last 24 Hrs  T(C): 36.8 (08 May 2020 05:06), Max: 36.9 (07 May 2020 12:28)  T(F): 98.3 (08 May 2020 05:06), Max: 98.4 (07 May 2020 12:28)  HR: 83 (08 May 2020 05:06) (67 - 83)  BP: 157/104 (08 May 2020 05:06) (135/67 - 161/61)  BP(mean): --  RR: 20 (08 May 2020 05:06) (18 - 20)  SpO2: 100% (08 May 2020 05:06) (97% - 100%)    Physical Exam:  General Appearance: Appears well, NAD  HEENT: Grossly symmetric  Chest: Non labored breathing  CV: Pulse regular presently  Abdomen: Soft, nontender, nondistended, midline w/ good granulation tissue, ostomy w/ large amounts of soft stool  Extremities: Grossly symmetric, no swelling, warm.   Lines/drains/tubes:    I&O's Summary    07 May 2020 07:01  -  08 May 2020 07:00  --------------------------------------------------------  IN: 0 mL / OUT: 450 mL / NET: -450 mL        LABS:                        8.0    7.62  )-----------( 308      ( 07 May 2020 06:31 )             24.5     05-07    136  |  101  |  32<H>  ----------------------------<  138<H>  4.5   |  21<L>  |  1.81<H>    Ca    9.2      07 May 2020 06:31  Phos  4.8     05-07  Mg     1.8     05-07    TPro  7.4  /  Alb  3.3  /  TBili  0.2  /  DBili  x   /  AST  17  /  ALT  20  /  AlkPhos  114  05-07      Urinalysis Basic - ( 07 May 2020 11:55 )  Color: Yellow / Appearance: Clear / S.010 / pH: x  Gluc: x / Ketone: NEGATIVE  / Bili: Negative / Urobili: 0.2 E.U./dL   Blood: x / Protein: Trace mg/dL / Nitrite: NEGATIVE   Leuk Esterase: NEGATIVE / RBC: < 5 /HPF / WBC < 5 /HPF   Sq Epi: x / Non Sq Epi: 0-5 /HPF / Bacteria: Present /HPF      CAPILLARY BLOOD GLUCOSE  POCT Blood Glucose.: 143 mg/dL (08 May 2020 08:17)  POCT Blood Glucose.: 163 mg/dL (07 May 2020 22:01)  POCT Blood Glucose.: 162 mg/dL (07 May 2020 17:02)  POCT Blood Glucose.: 111 mg/dL (07 May 2020 11:35)  POCT Blood Glucose.: 296 mg/dL (07 May 2020 09:19)    LIVER FUNCTIONS - ( 07 May 2020 06:31 )  Alb: 3.3 g/dL / Pro: 7.4 g/dL / ALK PHOS: 114 U/L / ALT: 20 U/L / AST: 17 U/L / GGT: x             RADIOLOGY & ADDITIONAL STUDIES:

## 2020-05-08 NOTE — PROGRESS NOTE ADULT - PROBLEM SELECTOR PLAN 5
normocytic anemia likely 2/2 chronic disease + kidney dysfunction vs less likely acute blood loss  -Hgb downtrended to 6.6 on 5/5, s/p unit PRBC on 5/5, H/H now stable at 8.3/25.7  -monitor hb, transfuse <7  -s/p 7U pRBCs total this admission as of 5/5/20  -pantoprazole 40mg BID   -monitor for signs of bleeding   -GI no longer following, states no longer any need for capsule endoscopy - patient refused

## 2020-05-09 LAB
GLUCOSE BLDC GLUCOMTR-MCNC: 116 MG/DL — HIGH (ref 70–99)
GLUCOSE BLDC GLUCOMTR-MCNC: 138 MG/DL — HIGH (ref 70–99)
GLUCOSE BLDC GLUCOMTR-MCNC: 154 MG/DL — HIGH (ref 70–99)
GLUCOSE BLDC GLUCOMTR-MCNC: 240 MG/DL — HIGH (ref 70–99)

## 2020-05-09 PROCEDURE — 99232 SBSQ HOSP IP/OBS MODERATE 35: CPT | Mod: GC

## 2020-05-09 RX ADMIN — AMLODIPINE BESYLATE 10 MILLIGRAM(S): 2.5 TABLET ORAL at 04:44

## 2020-05-09 RX ADMIN — Medication 650 MILLIGRAM(S): at 04:45

## 2020-05-09 RX ADMIN — GABAPENTIN 300 MILLIGRAM(S): 400 CAPSULE ORAL at 13:27

## 2020-05-09 RX ADMIN — BRIMONIDINE TARTRATE 1 DROP(S): 2 SOLUTION/ DROPS OPHTHALMIC at 04:46

## 2020-05-09 RX ADMIN — Medication 100 MILLIGRAM(S): at 21:02

## 2020-05-09 RX ADMIN — BRIMONIDINE TARTRATE 1 DROP(S): 2 SOLUTION/ DROPS OPHTHALMIC at 13:29

## 2020-05-09 RX ADMIN — DORZOLAMIDE HYDROCHLORIDE 1 DROP(S): 20 SOLUTION/ DROPS OPHTHALMIC at 21:42

## 2020-05-09 RX ADMIN — BRIMONIDINE TARTRATE 1 DROP(S): 2 SOLUTION/ DROPS OPHTHALMIC at 21:42

## 2020-05-09 RX ADMIN — Medication 6 UNIT(S): at 12:06

## 2020-05-09 RX ADMIN — Medication 2: at 12:06

## 2020-05-09 RX ADMIN — Medication 2.5 MILLIGRAM(S): at 17:54

## 2020-05-09 RX ADMIN — Medication 650 MILLIGRAM(S): at 21:02

## 2020-05-09 RX ADMIN — ATORVASTATIN CALCIUM 10 MILLIGRAM(S): 80 TABLET, FILM COATED ORAL at 21:42

## 2020-05-09 RX ADMIN — Medication 650 MILLIGRAM(S): at 13:29

## 2020-05-09 RX ADMIN — PANTOPRAZOLE SODIUM 40 MILLIGRAM(S): 20 TABLET, DELAYED RELEASE ORAL at 04:44

## 2020-05-09 RX ADMIN — Medication 6 UNIT(S): at 17:38

## 2020-05-09 RX ADMIN — CHLORHEXIDINE GLUCONATE 1 APPLICATION(S): 213 SOLUTION TOPICAL at 11:41

## 2020-05-09 RX ADMIN — Medication 6 UNIT(S): at 08:06

## 2020-05-09 RX ADMIN — DORZOLAMIDE HYDROCHLORIDE 1 DROP(S): 20 SOLUTION/ DROPS OPHTHALMIC at 13:29

## 2020-05-09 RX ADMIN — HEPARIN SODIUM 7500 UNIT(S): 5000 INJECTION INTRAVENOUS; SUBCUTANEOUS at 21:02

## 2020-05-09 RX ADMIN — Medication 2: at 23:35

## 2020-05-09 RX ADMIN — HEPARIN SODIUM 7500 UNIT(S): 5000 INJECTION INTRAVENOUS; SUBCUTANEOUS at 04:43

## 2020-05-09 RX ADMIN — PANTOPRAZOLE SODIUM 40 MILLIGRAM(S): 20 TABLET, DELAYED RELEASE ORAL at 17:39

## 2020-05-09 RX ADMIN — INSULIN GLARGINE 14 UNIT(S): 100 INJECTION, SOLUTION SUBCUTANEOUS at 23:35

## 2020-05-09 RX ADMIN — Medication 2.5 MILLIGRAM(S): at 04:45

## 2020-05-09 RX ADMIN — DORZOLAMIDE HYDROCHLORIDE 1 DROP(S): 20 SOLUTION/ DROPS OPHTHALMIC at 04:46

## 2020-05-09 RX ADMIN — GABAPENTIN 300 MILLIGRAM(S): 400 CAPSULE ORAL at 21:01

## 2020-05-09 RX ADMIN — HEPARIN SODIUM 7500 UNIT(S): 5000 INJECTION INTRAVENOUS; SUBCUTANEOUS at 13:28

## 2020-05-09 RX ADMIN — GABAPENTIN 300 MILLIGRAM(S): 400 CAPSULE ORAL at 04:44

## 2020-05-09 RX ADMIN — Medication 1 TABLET(S): at 11:40

## 2020-05-09 NOTE — PROGRESS NOTE ADULT - SUBJECTIVE AND OBJECTIVE BOX
SUBJECTIVE: Patient seen and examined by chief resident. Pain well controlled, re diet with no n/v. Ready to go home.       Vital Signs Last 24 Hrs  T(C): 36.8 (09 May 2020 05:02), Max: 36.8 (09 May 2020 05:02)  T(F): 98.2 (09 May 2020 05:02), Max: 98.2 (09 May 2020 05:02)  HR: 73 (09 May 2020 05:02) (73 - 76)  BP: 162/67 (09 May 2020 05:02) (155/63 - 171/78)  BP(mean): --  RR: 18 (09 May 2020 05:02) (18 - 18)  SpO2: 95% (09 May 2020 05:02) (95% - 99%)    Physical Exam:  General: NAD  Pulmonary: Nonlabored breathing, no respiratory distress  Abdominal: Soft, nondistended, nontender to palpation, incision with appropriate granulation tissue, ostomy w/ large amounts of soft stool  Extremities: WWP, normal strength, no clubbing/cyanosis/edema  Neuro: A/O x3    Lines/drains/tubes:    I&O's Summary    08 May 2020 07:01  -  09 May 2020 07:00  --------------------------------------------------------  IN: 0 mL / OUT: 800 mL / NET: -800 mL        LABS:            Urinalysis Basic - ( 07 May 2020 11:55 )    Color: Yellow / Appearance: Clear / S.010 / pH: x  Gluc: x / Ketone: NEGATIVE  / Bili: Negative / Urobili: 0.2 E.U./dL   Blood: x / Protein: Trace mg/dL / Nitrite: NEGATIVE   Leuk Esterase: NEGATIVE / RBC: < 5 /HPF / WBC < 5 /HPF   Sq Epi: x / Non Sq Epi: 0-5 /HPF / Bacteria: Present /HPF      CAPILLARY BLOOD GLUCOSE      POCT Blood Glucose.: 116 mg/dL (09 May 2020 07:52)  POCT Blood Glucose.: 225 mg/dL (08 May 2020 21:37)  POCT Blood Glucose.: 126 mg/dL (08 May 2020 17:37)  POCT Blood Glucose.: 184 mg/dL (08 May 2020 11:42)        RADIOLOGY & ADDITIONAL STUDIES:

## 2020-05-09 NOTE — PROGRESS NOTE ADULT - SUBJECTIVE AND OBJECTIVE BOX
INTERVAL HPI/OVERNIGHT EVENTS:    Patient is a 60y old  Male who presents with a chief complaint of toxic megacolon, COVID + (06 May 2020 10:01)      Pt reports the following symptoms:    CONSTITUTIONAL:  Negative fever or chills, feels well, good appetite  EYES:  Negative  blurry vision or double vision  CARDIOVASCULAR:  Negative for chest pain or palpitations  RESPIRATORY:  Negative for cough, wheezing, or SOB   GASTROINTESTINAL:  Negative for nausea, vomiting, diarrhea, constipation, or abdominal pain  GENITOURINARY:  Negative frequency, urgency or dysuria  NEUROLOGIC:  No headache, confusion, dizziness, lightheadedness    MEDICATIONS  (STANDING):  amLODIPine   Tablet 10 milliGRAM(s) Oral daily  atorvastatin 10 milliGRAM(s) Oral at bedtime  baclofen 2.5 milliGRAM(s) Oral every 12 hours  brimonidine 0.2% Ophthalmic Solution 1 Drop(s) Both EYES three times a day  chlorhexidine 2% Cloths 1 Application(s) Topical daily  dextrose 5%. 1000 milliLiter(s) (50 mL/Hr) IV Continuous <Continuous>  dextrose 50% Injectable 12.5 Gram(s) IV Push once  dextrose 50% Injectable 25 Gram(s) IV Push once  dextrose 50% Injectable 25 Gram(s) IV Push once  diphenoxylate/atropine 1 Tablet(s) Oral daily  dorzolamide 2% Ophthalmic Solution 1 Drop(s) Both EYES three times a day  gabapentin 300 milliGRAM(s) Oral every 8 hours  heparin   Injectable 7500 Unit(s) SubCutaneous every 8 hours  insulin glargine Injectable (LANTUS) 14 Unit(s) SubCutaneous at bedtime  insulin lispro (HumaLOG) corrective regimen sliding scale   SubCutaneous at bedtime  insulin lispro (HumaLOG) corrective regimen sliding scale   SubCutaneous three times a day before meals  insulin lispro Injectable (HumaLOG) 6 Unit(s) SubCutaneous three times a day before meals  pantoprazole    Tablet 40 milliGRAM(s) Oral every 12 hours  rifAXIMin 550 milliGRAM(s) Oral two times a day  sodium bicarbonate 650 milliGRAM(s) Oral three times a day  traZODone 100 milliGRAM(s) Oral at bedtime    MEDICATIONS  (PRN):  acetaminophen   Tablet .. 500 milliGRAM(s) Oral every 6 hours PRN Mild Pain (1 - 3), Moderate Pain (4 - 6)  dextrose 40% Gel 15 Gram(s) Oral once PRN Blood Glucose LESS THAN 70 milliGRAM(s)/deciliter  glucagon  Injectable 1 milliGRAM(s) IntraMuscular once PRN Glucose LESS THAN 70 milligrams/deciliter  ondansetron    Tablet 4 milliGRAM(s) Oral every 8 hours PRN Nausea and/or Vomiting      PHYSICAL EXAM  Vital Signs Last 24 Hrs  T(C): 36.8 (09 May 2020 05:02), Max: 36.8 (09 May 2020 05:02)  T(F): 98.2 (09 May 2020 05:02), Max: 98.2 (09 May 2020 05:02)  HR: 73 (09 May 2020 05:02) (73 - 76)  BP: 162/67 (09 May 2020 05:02) (155/63 - 171/78)  BP(mean): --  RR: 18 (09 May 2020 05:02) (18 - 18)  SpO2: 95% (09 May 2020 05:02) (95% - 99%)    Constitutional: wn/wd in NAD.   HEENT: NCAT, MMM, OP clear, EOMI, no proptosis or lid retraction  Neck: no thyromegaly or palpable thyroid nodules   Respiratory: lungs CTAB.  Cardiovascular: regular rhythm, normal S1 and S2, no audible murmurs, no peripheral edema  GI: soft, NT/ND, no masses/HSM appreciated.  Neurology: no tremors, DTR 2+  Skin: no visible rashes/lesions  Psychiatric: AAO x 3, normal affect/mood.    LABS:              Thyroid Stimulating Hormone, Serum: 2.712 uIU/mL (05-07 @ 06:31)  Thyroid Stimulating Hormone, Serum: 3.063 uIU/mL (05-07 @ 06:31)  Thyroid Stimulating Hormone, Serum: 3.509 uIU/mL (05-03 @ 06:07)  Thyroid Stimulating Hormone, Serum: 3.325 uIU/mL (04-29 @ 06:17)  Thyroid Stimulating Hormone, Serum: 2.813 uIU/mL (04-14 @ 07:28)  Thyroid Stimulating Hormone, Serum: 2.877 uIU/mL (04-14 @ 00:34)      HbA1C: 6.9 % (04-05 @ 06:59)  7.6 % (03-31 @ 05:58)    CAPILLARY BLOOD GLUCOSE      POCT Blood Glucose.: 154 mg/dL (09 May 2020 11:53)  POCT Blood Glucose.: 116 mg/dL (09 May 2020 07:52)  POCT Blood Glucose.: 225 mg/dL (08 May 2020 21:37)  POCT Blood Glucose.: 126 mg/dL (08 May 2020 17:37)      Insulin Sliding Scale requirements X 24 Hours:    RADIOLOGY & ADDITIONAL TESTS:    A/P: 60y Male with history of DM type II presenting for       1.  DM -     Please continue           units lantus at bedtime  / in the morning and        units lispro with meals and lispro moderate / low dose sliding scale 4 times daily with meals and at bedtime.  Please continue consistent carbohydrate diet.      Goal FSG is   Will continue to monitor   For discharge, pt can continue    Pt can follow up at discharge with [facility] by calling  to make an appointment.   Will discuss case with     and update primary team

## 2020-05-09 NOTE — PROGRESS NOTE ADULT - SUBJECTIVE AND OBJECTIVE BOX
INTERVAL HPI/OVERNIGHT EVENTS: Unable to hotel placement    SUBJECTIVE: Patient seen and examined at bedside. Remains in good spirit, waiting for placement. No fever, chills, SOB, CP, palpitations, melena, n/v or diarrhea.     OBJECTIVE:    VITAL SIGNS:  ICU Vital Signs Last 24 Hrs  T(C): 36.8 (09 May 2020 05:02), Max: 36.8 (09 May 2020 05:02)  T(F): 98.2 (09 May 2020 05:02), Max: 98.2 (09 May 2020 05:02)  HR: 73 (09 May 2020 05:02) (73 - 76)  BP: 162/67 (09 May 2020 05:02) (155/63 - 171/78)  BP(mean): --  ABP: --  ABP(mean): --  RR: 18 (09 May 2020 05:02) (18 - 18)  SpO2: 95% (09 May 2020 05:02) (95% - 99%)        05-08 @ 07:01  -  05-09 @ 07:00  --------------------------------------------------------  IN: 0 mL / OUT: 800 mL / NET: -800 mL      CAPILLARY BLOOD GLUCOSE      POCT Blood Glucose.: 116 mg/dL (09 May 2020 07:52)      PHYSICAL EXAM:  General: NAD, resting comfortably in bed  HEENT: NCAT, EOMI, MMM  Neck: supple  Respiratory: CTA, normal respiratory rate/depth/effort  Cardiovascular: normal S1/S2, RRR, no MRG  Vascular: 2+ radial/DP pulses b/l  Abdomen: NT, soft, distended, ileostomy output nonbloody; midline wound dressing c/d/i  Extremities: WWP, no edema  Neuro: AAOx3    MEDICATIONS:  MEDICATIONS  (STANDING):  amLODIPine   Tablet 10 milliGRAM(s) Oral daily  atorvastatin 10 milliGRAM(s) Oral at bedtime  baclofen 2.5 milliGRAM(s) Oral every 12 hours  brimonidine 0.2% Ophthalmic Solution 1 Drop(s) Both EYES three times a day  chlorhexidine 2% Cloths 1 Application(s) Topical daily  dextrose 5%. 1000 milliLiter(s) (50 mL/Hr) IV Continuous <Continuous>  dextrose 50% Injectable 12.5 Gram(s) IV Push once  dextrose 50% Injectable 25 Gram(s) IV Push once  dextrose 50% Injectable 25 Gram(s) IV Push once  diphenoxylate/atropine 1 Tablet(s) Oral daily  dorzolamide 2% Ophthalmic Solution 1 Drop(s) Both EYES three times a day  gabapentin 300 milliGRAM(s) Oral every 8 hours  heparin   Injectable 7500 Unit(s) SubCutaneous every 8 hours  insulin glargine Injectable (LANTUS) 14 Unit(s) SubCutaneous at bedtime  insulin lispro (HumaLOG) corrective regimen sliding scale   SubCutaneous at bedtime  insulin lispro (HumaLOG) corrective regimen sliding scale   SubCutaneous three times a day before meals  insulin lispro Injectable (HumaLOG) 6 Unit(s) SubCutaneous three times a day before meals  pantoprazole    Tablet 40 milliGRAM(s) Oral every 12 hours  rifAXIMin 550 milliGRAM(s) Oral two times a day  sodium bicarbonate 650 milliGRAM(s) Oral three times a day  traZODone 100 milliGRAM(s) Oral at bedtime    MEDICATIONS  (PRN):  acetaminophen   Tablet .. 500 milliGRAM(s) Oral every 6 hours PRN Mild Pain (1 - 3), Moderate Pain (4 - 6)  dextrose 40% Gel 15 Gram(s) Oral once PRN Blood Glucose LESS THAN 70 milliGRAM(s)/deciliter  glucagon  Injectable 1 milliGRAM(s) IntraMuscular once PRN Glucose LESS THAN 70 milligrams/deciliter  ondansetron    Tablet 4 milliGRAM(s) Oral every 8 hours PRN Nausea and/or Vomiting      ALLERGIES:  Allergies    No Known Allergies    Intolerances        LABS:            Urinalysis Basic - ( 07 May 2020 11:55 )    Color: Yellow / Appearance: Clear / S.010 / pH: x  Gluc: x / Ketone: NEGATIVE  / Bili: Negative / Urobili: 0.2 E.U./dL   Blood: x / Protein: Trace mg/dL / Nitrite: NEGATIVE   Leuk Esterase: NEGATIVE / RBC: < 5 /HPF / WBC < 5 /HPF   Sq Epi: x / Non Sq Epi: 0-5 /HPF / Bacteria: Present /HPF        RADIOLOGY & ADDITIONAL TESTS: Reviewed.

## 2020-05-09 NOTE — PROGRESS NOTE ADULT - ATTENDING COMMENTS
60M w DM c/b neuropathy, IVDU, EtOH c/b cirrhosis, ascites, HTN presented w fall found to have toxic megacolon s/p ex-lap and subtotal colectomy w ileostomy, found to have COVID, MEG (resolved), s/p paracentesis w negative cytlology now pending disposition to shelter    Pt comfortable in bed. Re-iterated current regimen of pain medications. Denies any nausea, abd pain. Exam shows male in bed, RRR, nml effort, CTAB, distended abd but soft on palpation, ostomy w light brown output, surrounding area c/d/i, mid-ventral dressings in place, alert, moving all ext    Dispo: to shelter - otherwise medically stable for DC. Previously confirmed yesterday however deemed to require shelter with higher level of care ([facility]) Current case is being reviewed per SW.

## 2020-05-09 NOTE — PROGRESS NOTE ADULT - ASSESSMENT
60M PMH HTN, DM + neuropathy, IVDU, ETOH liver cirrhosis, presents to [facility] s/p fall at home; found with toxic megacolon and pneumatosis; now s/p ex lap + subtotal colectomy, ileostomy (3/31). Hospital course further complicated by COVID, MEG, anemia (refused further GI workeup). Pt stable for discharge pending instatement of proper woundcare services.

## 2020-05-10 LAB
ANION GAP SERPL CALC-SCNC: 14 MMOL/L — SIGNIFICANT CHANGE UP (ref 5–17)
BUN SERPL-MCNC: 38 MG/DL — HIGH (ref 7–23)
CALCIUM SERPL-MCNC: 9.8 MG/DL — SIGNIFICANT CHANGE UP (ref 8.4–10.5)
CHLORIDE SERPL-SCNC: 97 MMOL/L — SIGNIFICANT CHANGE UP (ref 96–108)
CO2 SERPL-SCNC: 20 MMOL/L — LOW (ref 22–31)
CREAT SERPL-MCNC: 1.62 MG/DL — HIGH (ref 0.5–1.3)
GLUCOSE BLDC GLUCOMTR-MCNC: 106 MG/DL — HIGH (ref 70–99)
GLUCOSE BLDC GLUCOMTR-MCNC: 151 MG/DL — HIGH (ref 70–99)
GLUCOSE BLDC GLUCOMTR-MCNC: 152 MG/DL — HIGH (ref 70–99)
GLUCOSE BLDC GLUCOMTR-MCNC: 264 MG/DL — HIGH (ref 70–99)
GLUCOSE SERPL-MCNC: 104 MG/DL — HIGH (ref 70–99)
HCT VFR BLD CALC: 25.5 % — LOW (ref 39–50)
HGB BLD-MCNC: 8.2 G/DL — LOW (ref 13–17)
MAGNESIUM SERPL-MCNC: 1.5 MG/DL — LOW (ref 1.6–2.6)
MCHC RBC-ENTMCNC: 28.6 PG — SIGNIFICANT CHANGE UP (ref 27–34)
MCHC RBC-ENTMCNC: 32.2 GM/DL — SIGNIFICANT CHANGE UP (ref 32–36)
MCV RBC AUTO: 88.9 FL — SIGNIFICANT CHANGE UP (ref 80–100)
NRBC # BLD: 0 /100 WBCS — SIGNIFICANT CHANGE UP (ref 0–0)
PHOSPHATE SERPL-MCNC: 5.1 MG/DL — HIGH (ref 2.5–4.5)
PLATELET # BLD AUTO: 278 K/UL — SIGNIFICANT CHANGE UP (ref 150–400)
POTASSIUM SERPL-MCNC: 4.3 MMOL/L — SIGNIFICANT CHANGE UP (ref 3.5–5.3)
POTASSIUM SERPL-SCNC: 4.3 MMOL/L — SIGNIFICANT CHANGE UP (ref 3.5–5.3)
RBC # BLD: 2.87 M/UL — LOW (ref 4.2–5.8)
RBC # FLD: 14.3 % — SIGNIFICANT CHANGE UP (ref 10.3–14.5)
SODIUM SERPL-SCNC: 131 MMOL/L — LOW (ref 135–145)
WBC # BLD: 8.3 K/UL — SIGNIFICANT CHANGE UP (ref 3.8–10.5)
WBC # FLD AUTO: 8.3 K/UL — SIGNIFICANT CHANGE UP (ref 3.8–10.5)

## 2020-05-10 PROCEDURE — 99231 SBSQ HOSP IP/OBS SF/LOW 25: CPT | Mod: GC

## 2020-05-10 RX ORDER — MAGNESIUM SULFATE 500 MG/ML
2 VIAL (ML) INJECTION ONCE
Refills: 0 | Status: COMPLETED | OUTPATIENT
Start: 2020-05-10 | End: 2020-05-10

## 2020-05-10 RX ORDER — SPIRONOLACTONE 25 MG/1
25 TABLET, FILM COATED ORAL DAILY
Refills: 0 | Status: DISCONTINUED | OUTPATIENT
Start: 2020-05-10 | End: 2020-05-12

## 2020-05-10 RX ORDER — FUROSEMIDE 40 MG
10 TABLET ORAL DAILY
Refills: 0 | Status: DISCONTINUED | OUTPATIENT
Start: 2020-05-10 | End: 2020-05-12

## 2020-05-10 RX ADMIN — PANTOPRAZOLE SODIUM 40 MILLIGRAM(S): 20 TABLET, DELAYED RELEASE ORAL at 17:08

## 2020-05-10 RX ADMIN — CHLORHEXIDINE GLUCONATE 1 APPLICATION(S): 213 SOLUTION TOPICAL at 11:38

## 2020-05-10 RX ADMIN — Medication 650 MILLIGRAM(S): at 06:04

## 2020-05-10 RX ADMIN — BRIMONIDINE TARTRATE 1 DROP(S): 2 SOLUTION/ DROPS OPHTHALMIC at 06:05

## 2020-05-10 RX ADMIN — Medication 6 UNIT(S): at 08:07

## 2020-05-10 RX ADMIN — BRIMONIDINE TARTRATE 1 DROP(S): 2 SOLUTION/ DROPS OPHTHALMIC at 23:37

## 2020-05-10 RX ADMIN — Medication 50 GRAM(S): at 09:23

## 2020-05-10 RX ADMIN — Medication 2: at 17:07

## 2020-05-10 RX ADMIN — GABAPENTIN 300 MILLIGRAM(S): 400 CAPSULE ORAL at 21:10

## 2020-05-10 RX ADMIN — ATORVASTATIN CALCIUM 10 MILLIGRAM(S): 80 TABLET, FILM COATED ORAL at 21:09

## 2020-05-10 RX ADMIN — INSULIN GLARGINE 14 UNIT(S): 100 INJECTION, SOLUTION SUBCUTANEOUS at 21:38

## 2020-05-10 RX ADMIN — Medication 3: at 21:39

## 2020-05-10 RX ADMIN — Medication 100 MILLIGRAM(S): at 21:09

## 2020-05-10 RX ADMIN — HEPARIN SODIUM 7500 UNIT(S): 5000 INJECTION INTRAVENOUS; SUBCUTANEOUS at 21:09

## 2020-05-10 RX ADMIN — Medication 6 UNIT(S): at 17:07

## 2020-05-10 RX ADMIN — Medication 650 MILLIGRAM(S): at 21:09

## 2020-05-10 RX ADMIN — SPIRONOLACTONE 25 MILLIGRAM(S): 25 TABLET, FILM COATED ORAL at 13:36

## 2020-05-10 RX ADMIN — HEPARIN SODIUM 7500 UNIT(S): 5000 INJECTION INTRAVENOUS; SUBCUTANEOUS at 06:04

## 2020-05-10 RX ADMIN — Medication 650 MILLIGRAM(S): at 13:23

## 2020-05-10 RX ADMIN — GABAPENTIN 300 MILLIGRAM(S): 400 CAPSULE ORAL at 13:22

## 2020-05-10 RX ADMIN — AMLODIPINE BESYLATE 10 MILLIGRAM(S): 2.5 TABLET ORAL at 06:03

## 2020-05-10 RX ADMIN — HEPARIN SODIUM 7500 UNIT(S): 5000 INJECTION INTRAVENOUS; SUBCUTANEOUS at 13:23

## 2020-05-10 RX ADMIN — PANTOPRAZOLE SODIUM 40 MILLIGRAM(S): 20 TABLET, DELAYED RELEASE ORAL at 06:03

## 2020-05-10 RX ADMIN — Medication 10 MILLIGRAM(S): at 14:29

## 2020-05-10 RX ADMIN — Medication 6 UNIT(S): at 12:01

## 2020-05-10 RX ADMIN — Medication 2: at 12:01

## 2020-05-10 RX ADMIN — Medication 2.5 MILLIGRAM(S): at 17:08

## 2020-05-10 RX ADMIN — DORZOLAMIDE HYDROCHLORIDE 1 DROP(S): 20 SOLUTION/ DROPS OPHTHALMIC at 06:05

## 2020-05-10 RX ADMIN — DORZOLAMIDE HYDROCHLORIDE 1 DROP(S): 20 SOLUTION/ DROPS OPHTHALMIC at 23:37

## 2020-05-10 RX ADMIN — BRIMONIDINE TARTRATE 1 DROP(S): 2 SOLUTION/ DROPS OPHTHALMIC at 13:23

## 2020-05-10 RX ADMIN — Medication 1 TABLET(S): at 11:37

## 2020-05-10 RX ADMIN — Medication 2.5 MILLIGRAM(S): at 06:03

## 2020-05-10 RX ADMIN — DORZOLAMIDE HYDROCHLORIDE 1 DROP(S): 20 SOLUTION/ DROPS OPHTHALMIC at 13:24

## 2020-05-10 RX ADMIN — GABAPENTIN 300 MILLIGRAM(S): 400 CAPSULE ORAL at 06:04

## 2020-05-10 NOTE — PROGRESS NOTE ADULT - PROBLEM SELECTOR PLAN 2
decompensated cirrhosis c/b ascites likely 2/2 alcohol abuse  -SBP ruled out, hepatic encephalopathy present on admission now resolved  -CT abd/pelvis with persistent large ascites and peritonitis; bilateral pleural effusions and pericardial effusion  -s/p paracentesis on 4/10 with negative cytology but not sent for cell count/culture, however was on zosyn for 2 weeks so GI with no concerns for SBP   -s/p repeat paracentesis 4/19, but unable to aspirate significant amount of fluid   -SAAG 1.4 consistent with portal hypertension as etiology of ascites  -abd u/s 5/3 small ascites   -Rifaximin 550mg bid for hepatic encephalopathy   -holding Lactulose given high ostomy output  -pt refusing video capsule endoscopy this admission   -maximum Tylenol dosing 2g per day, will switch to 1 g BID per patient request   -screening for HCC outpatient with hepatology follow-up decompensated cirrhosis c/b ascites likely 2/2 alcohol abuse  -initiated lasix 10mg-spirolactone 25mg for ascites   -SBP ruled out, hepatic encephalopathy present on admission now resolved  -CT abd/pelvis with persistent large ascites and peritonitis; bilateral pleural effusions and pericardial effusion  -s/p paracentesis on 4/10 with negative cytology but not sent for cell count/culture, however was on zosyn for 2 weeks so GI with no concerns for SBP   -s/p repeat paracentesis 4/19, but unable to aspirate significant amount of fluid   -SAAG 1.4 consistent with portal hypertension as etiology of ascites  -abd u/s 5/3 small ascites   -Rifaximin 550mg bid for hepatic encephalopathy   -holding Lactulose given high ostomy output  -pt refusing video capsule endoscopy this admission   -maximum Tylenol dosing 2g per day, will switch to 1 g BID per patient request   -screening for HCC outpatient with hepatology follow-up

## 2020-05-10 NOTE — PROGRESS NOTE ADULT - PROBLEM SELECTOR PLAN 10
F: none  E: replete prn, s/p Mag 2mg IV x1 for magnesium level 1.5  N: low fiber, consistent carbs  GI ppx: protonix BID   DVT ppx: Sub Q heparin, DVT neg by ultrasound    DISPO pending establishment of proper woundcare upon discharge F: none  E: replete prn to K>4; Mg>2   N: low fiber, consistent carbs  GI ppx: protonix BID   DVT ppx: Sub Q heparin, DVT neg by ultrasound    DISPO pending establishment of proper woundcare upon discharge

## 2020-05-10 NOTE — PROGRESS NOTE ADULT - SUBJECTIVE AND OBJECTIVE BOX
General Surgery Note    Pt comfortable without complaints, pain controlled  Denies CP, SOB, N/V, numbness/tingling     Vital Signs Last 24 Hrs  T(C): 36.9 (05-10-20 @ 05:32), Max: 36.9 (05-10-20 @ 05:32)  T(F): 98.5 (05-10-20 @ 05:32), Max: 98.5 (05-10-20 @ 05:32)  HR: 72 (05-10-20 @ 05:32) (72 - 72)  BP: 164/71 (05-10-20 @ 05:32) (164/71 - 164/71)  BP(mean): --  RR: 19 (05-10-20 @ 05:32) (19 - 19)  SpO2: 98% (05-10-20 @ 05:32) (98% - 98%)  AVSS    General: NAD  Pulmonary: Nonlabored breathing, no respiratory distress  Abdominal: Soft, nondistended, nontender to palpation, incision with appropriate granulation tissue, ostomy w/ large amounts of soft stool  Extremities: WWP, normal strength, no clubbing/cyanosis/edema  Neuro: A/O x3          61 y/o M with PMH of HTN, DM complicated by neuropathy, EtOH liver cirrhosis, presenting w/ pneumatosis of unknown etiology, s/p sub total colectomy and end ileostomy on 3/31, c/b fever and purulent wound infection (4/9) now s/p paracentesis 4/13 found to be COVID (+). Clinically stable. In no acute distress.   *** INCOMPLETE NOTE ****  -c/w lomotil  -trend ostomy output  -dispo planning and care per medicine General Surgery Note    Pt comfortable without complaints, pain controlled  Denies CP, SOB, N/V, numbness/tingling     Vital Signs Last 24 Hrs  T(C): 36.9 (05-10-20 @ 05:32), Max: 36.9 (05-10-20 @ 05:32)  T(F): 98.5 (05-10-20 @ 05:32), Max: 98.5 (05-10-20 @ 05:32)  HR: 72 (05-10-20 @ 05:32) (72 - 72)  BP: 164/71 (05-10-20 @ 05:32) (164/71 - 164/71)  BP(mean): --  RR: 19 (05-10-20 @ 05:32) (19 - 19)  SpO2: 98% (05-10-20 @ 05:32) (98% - 98%)  AVSS    General: NAD  Pulmonary: Nonlabored breathing, no respiratory distress  Abdominal: Soft, nondistended, nontender to palpation, incision with appropriate granulation tissue, ostomy w/ large amounts of soft stool  Extremities: WWP, normal strength, no clubbing/cyanosis/edema  Neuro: A/O x3          59 y/o M with PMH of HTN, DM complicated by neuropathy, EtOH liver cirrhosis, presenting w/ pneumatosis of unknown etiology, s/p sub total colectomy and end ileostomy on 3/31, c/b fever and purulent wound infection (4/9) now s/p paracentesis 4/13 found to be COVID (+). Clinically stable. In no acute distress.       -c/w lomotil  -trend ostomy output/ ostomy site care  -dispo planning and care per medicine

## 2020-05-10 NOTE — PROGRESS NOTE ADULT - PROBLEM SELECTOR PLAN 4
nonoliguric MEG likely 2/2 pre-renal + intrinsic 2/2 COVID-19 +/- contrast exposure +/- vancomycin toxicity  -Cr with continued downtrend  -avoid nephrotoxic agents  -gentle IV hydration if high GI output  -monitor I/Os   -renal following - appreciate recs    #Non-anion gap metabolic acidosis RESOLVED   -sodium bicarbonate TID  -f/u further nephrology recs

## 2020-05-10 NOTE — PROGRESS NOTE ADULT - ASSESSMENT
60M PMH HTN, DM + neuropathy, IVDU, ETOH liver cirrhosis, presents to [facility] s/p fall at home; found with toxic megacolon and pneumatosis; now s/p ex lap + subtotal colectomy, ileostomy (3/31). Hospital course further complicated by COVID, MEG, anemia (refused further GI workeup). Pt stable for discharge pending instatement of proper woundcare services.

## 2020-05-10 NOTE — PROGRESS NOTE ADULT - SUBJECTIVE AND OBJECTIVE BOX
OVERNIGHT EVENTS: No acute events overnight. Afebrile.     SUBJECTIVE / INTERVAL HPI: Patient seen and examined at bedside this AM. Patient is frustrated with entire situation, including ileostomy and pain medications.    VITAL SIGNS:  Vital Signs Last 24 Hrs  T(C): 36.9 (10 May 2020 05:32), Max: 36.9 (10 May 2020 05:32)  T(F): 98.5 (10 May 2020 05:32), Max: 98.5 (10 May 2020 05:32)  HR: 72 (10 May 2020 05:32) (72 - 74)  BP: 164/71 (10 May 2020 05:32) (163/73 - 164/71)  BP(mean): --  RR: 19 (10 May 2020 05:32) (17 - 19)  SpO2: 98% (10 May 2020 05:32) (98% - 98%)    PHYSICAL EXAM:    General: WDWN sitting up in bed eating breakfast   HEENT: NC/AT; PERRL, anicteric sclera; MMM  Neck: supple  Cardiovascular: +S1/S2; RRR  Respiratory: CTA B/L; no W/R/R  Gastrointestinal: soft, ileostomy with formed stool; midline incision with serosanguineous drainage   Extremities: WWP; no edema, clubbing or cyanosis  Vascular: 2+ radial, DP/PT pulses B/L  Neurological: AAOx3; no focal deficits    MEDICATIONS:  MEDICATIONS  (STANDING):  amLODIPine   Tablet 10 milliGRAM(s) Oral daily  atorvastatin 10 milliGRAM(s) Oral at bedtime  baclofen 2.5 milliGRAM(s) Oral every 12 hours  brimonidine 0.2% Ophthalmic Solution 1 Drop(s) Both EYES three times a day  chlorhexidine 2% Cloths 1 Application(s) Topical daily  dextrose 5%. 1000 milliLiter(s) (50 mL/Hr) IV Continuous <Continuous>  dextrose 50% Injectable 12.5 Gram(s) IV Push once  dextrose 50% Injectable 25 Gram(s) IV Push once  dextrose 50% Injectable 25 Gram(s) IV Push once  diphenoxylate/atropine 1 Tablet(s) Oral daily  dorzolamide 2% Ophthalmic Solution 1 Drop(s) Both EYES three times a day  furosemide    Tablet 10 milliGRAM(s) Oral daily  gabapentin 300 milliGRAM(s) Oral every 8 hours  heparin   Injectable 7500 Unit(s) SubCutaneous every 8 hours  insulin glargine Injectable (LANTUS) 14 Unit(s) SubCutaneous at bedtime  insulin lispro (HumaLOG) corrective regimen sliding scale   SubCutaneous at bedtime  insulin lispro (HumaLOG) corrective regimen sliding scale   SubCutaneous three times a day before meals  insulin lispro Injectable (HumaLOG) 6 Unit(s) SubCutaneous three times a day before meals  pantoprazole    Tablet 40 milliGRAM(s) Oral every 12 hours  rifAXIMin 550 milliGRAM(s) Oral two times a day  sodium bicarbonate 650 milliGRAM(s) Oral three times a day  spironolactone 25 milliGRAM(s) Oral daily  traZODone 100 milliGRAM(s) Oral at bedtime    MEDICATIONS  (PRN):  acetaminophen   Tablet .. 500 milliGRAM(s) Oral every 6 hours PRN Mild Pain (1 - 3), Moderate Pain (4 - 6)  dextrose 40% Gel 15 Gram(s) Oral once PRN Blood Glucose LESS THAN 70 milliGRAM(s)/deciliter  glucagon  Injectable 1 milliGRAM(s) IntraMuscular once PRN Glucose LESS THAN 70 milligrams/deciliter  ondansetron    Tablet 4 milliGRAM(s) Oral every 8 hours PRN Nausea and/or Vomiting      ALLERGIES:  Allergies    No Known Allergies    Intolerances        LABS:                        8.2    8.30  )-----------( 278      ( 10 May 2020 08:31 )             25.5     05-10    131<L>  |  97  |  38<H>  ----------------------------<  104<H>  4.3   |  20<L>  |  1.62<H>    Ca    9.8      10 May 2020 06:59  Phos  5.1     05-10  Mg     1.5     05-10          CAPILLARY BLOOD GLUCOSE      POCT Blood Glucose.: 152 mg/dL (10 May 2020 11:48)      RADIOLOGY & ADDITIONAL TESTS: Reviewed.    ASSESSMENT:    PLAN:

## 2020-05-10 NOTE — PROGRESS NOTE ADULT - ATTENDING COMMENTS
60M w DM c/b neuropathy, IVDU, EtOH c/b cirrhosis, ascites, HTN presented w fall found to have toxic megacolon s/p ex-lap and subtotal colectomy w ileostomy, found to have COVID, MEG (resolved), s/p paracentesis w negative cytlology now pending [facility] shelter[facility]  Pt c/o itching around incision dressings. Abd pain improved vs yesterday. Ostomy site clean wo leaks on exam  --added lasix and aldactone in setting of ascites and HTN  --dispo: case pending review/acceptance per SW

## 2020-05-11 LAB
ALBUMIN SERPL ELPH-MCNC: 3.4 G/DL — SIGNIFICANT CHANGE UP (ref 3.3–5)
ALP SERPL-CCNC: 103 U/L — SIGNIFICANT CHANGE UP (ref 40–120)
ALT FLD-CCNC: 19 U/L — SIGNIFICANT CHANGE UP (ref 10–45)
ANION GAP SERPL CALC-SCNC: 15 MMOL/L — SIGNIFICANT CHANGE UP (ref 5–17)
AST SERPL-CCNC: 17 U/L — SIGNIFICANT CHANGE UP (ref 10–40)
BILIRUB SERPL-MCNC: 0.2 MG/DL — SIGNIFICANT CHANGE UP (ref 0.2–1.2)
BUN SERPL-MCNC: 39 MG/DL — HIGH (ref 7–23)
CALCIUM SERPL-MCNC: 9.4 MG/DL — SIGNIFICANT CHANGE UP (ref 8.4–10.5)
CHLORIDE SERPL-SCNC: 99 MMOL/L — SIGNIFICANT CHANGE UP (ref 96–108)
CO2 SERPL-SCNC: 18 MMOL/L — LOW (ref 22–31)
CREAT SERPL-MCNC: 2.09 MG/DL — HIGH (ref 0.5–1.3)
GLUCOSE BLDC GLUCOMTR-MCNC: 165 MG/DL — HIGH (ref 70–99)
GLUCOSE BLDC GLUCOMTR-MCNC: 167 MG/DL — HIGH (ref 70–99)
GLUCOSE BLDC GLUCOMTR-MCNC: 236 MG/DL — HIGH (ref 70–99)
GLUCOSE BLDC GLUCOMTR-MCNC: 278 MG/DL — HIGH (ref 70–99)
GLUCOSE BLDC GLUCOMTR-MCNC: 64 MG/DL — LOW (ref 70–99)
GLUCOSE SERPL-MCNC: 162 MG/DL — HIGH (ref 70–99)
MAGNESIUM SERPL-MCNC: 1.4 MG/DL — LOW (ref 1.6–2.6)
PHOSPHATE SERPL-MCNC: 4.9 MG/DL — HIGH (ref 2.5–4.5)
POTASSIUM SERPL-MCNC: 4.9 MMOL/L — SIGNIFICANT CHANGE UP (ref 3.5–5.3)
POTASSIUM SERPL-SCNC: 4.9 MMOL/L — SIGNIFICANT CHANGE UP (ref 3.5–5.3)
PROT SERPL-MCNC: 7.8 G/DL — SIGNIFICANT CHANGE UP (ref 6–8.3)
SODIUM SERPL-SCNC: 132 MMOL/L — LOW (ref 135–145)
T4 FREE SERPL-MCNC: 0.86 NG/DL — SIGNIFICANT CHANGE UP (ref 0.7–1.48)
TSH SERPL-MCNC: 3.1 UIU/ML — SIGNIFICANT CHANGE UP (ref 0.35–4.94)

## 2020-05-11 PROCEDURE — 99231 SBSQ HOSP IP/OBS SF/LOW 25: CPT | Mod: GC

## 2020-05-11 PROCEDURE — 99232 SBSQ HOSP IP/OBS MODERATE 35: CPT

## 2020-05-11 RX ORDER — MAGNESIUM OXIDE 400 MG ORAL TABLET 241.3 MG
400 TABLET ORAL EVERY 6 HOURS
Refills: 0 | Status: COMPLETED | OUTPATIENT
Start: 2020-05-11 | End: 2020-05-11

## 2020-05-11 RX ORDER — MAGNESIUM SULFATE 500 MG/ML
2 VIAL (ML) INJECTION ONCE
Refills: 0 | Status: DISCONTINUED | OUTPATIENT
Start: 2020-05-11 | End: 2020-05-11

## 2020-05-11 RX ORDER — ACETAMINOPHEN 500 MG
650 TABLET ORAL ONCE
Refills: 0 | Status: COMPLETED | OUTPATIENT
Start: 2020-05-11 | End: 2020-05-11

## 2020-05-11 RX ADMIN — MAGNESIUM OXIDE 400 MG ORAL TABLET 400 MILLIGRAM(S): 241.3 TABLET ORAL at 12:03

## 2020-05-11 RX ADMIN — INSULIN GLARGINE 14 UNIT(S): 100 INJECTION, SOLUTION SUBCUTANEOUS at 22:12

## 2020-05-11 RX ADMIN — MAGNESIUM OXIDE 400 MG ORAL TABLET 400 MILLIGRAM(S): 241.3 TABLET ORAL at 17:22

## 2020-05-11 RX ADMIN — Medication 2: at 08:54

## 2020-05-11 RX ADMIN — DORZOLAMIDE HYDROCHLORIDE 1 DROP(S): 20 SOLUTION/ DROPS OPHTHALMIC at 14:40

## 2020-05-11 RX ADMIN — GABAPENTIN 300 MILLIGRAM(S): 400 CAPSULE ORAL at 05:33

## 2020-05-11 RX ADMIN — ATORVASTATIN CALCIUM 10 MILLIGRAM(S): 80 TABLET, FILM COATED ORAL at 21:51

## 2020-05-11 RX ADMIN — GABAPENTIN 300 MILLIGRAM(S): 400 CAPSULE ORAL at 12:03

## 2020-05-11 RX ADMIN — BRIMONIDINE TARTRATE 1 DROP(S): 2 SOLUTION/ DROPS OPHTHALMIC at 14:38

## 2020-05-11 RX ADMIN — Medication 4: at 17:56

## 2020-05-11 RX ADMIN — HEPARIN SODIUM 7500 UNIT(S): 5000 INJECTION INTRAVENOUS; SUBCUTANEOUS at 21:49

## 2020-05-11 RX ADMIN — Medication 6 UNIT(S): at 08:55

## 2020-05-11 RX ADMIN — Medication 650 MILLIGRAM(S): at 05:36

## 2020-05-11 RX ADMIN — Medication 2.5 MILLIGRAM(S): at 05:33

## 2020-05-11 RX ADMIN — Medication 2.5 MILLIGRAM(S): at 17:22

## 2020-05-11 RX ADMIN — Medication 1 TABLET(S): at 12:03

## 2020-05-11 RX ADMIN — Medication 650 MILLIGRAM(S): at 21:51

## 2020-05-11 RX ADMIN — DORZOLAMIDE HYDROCHLORIDE 1 DROP(S): 20 SOLUTION/ DROPS OPHTHALMIC at 22:07

## 2020-05-11 RX ADMIN — HEPARIN SODIUM 7500 UNIT(S): 5000 INJECTION INTRAVENOUS; SUBCUTANEOUS at 05:35

## 2020-05-11 RX ADMIN — Medication 500 MILLIGRAM(S): at 06:21

## 2020-05-11 RX ADMIN — Medication 650 MILLIGRAM(S): at 12:02

## 2020-05-11 RX ADMIN — AMLODIPINE BESYLATE 10 MILLIGRAM(S): 2.5 TABLET ORAL at 05:33

## 2020-05-11 RX ADMIN — DORZOLAMIDE HYDROCHLORIDE 1 DROP(S): 20 SOLUTION/ DROPS OPHTHALMIC at 06:16

## 2020-05-11 RX ADMIN — HEPARIN SODIUM 7500 UNIT(S): 5000 INJECTION INTRAVENOUS; SUBCUTANEOUS at 12:03

## 2020-05-11 RX ADMIN — PANTOPRAZOLE SODIUM 40 MILLIGRAM(S): 20 TABLET, DELAYED RELEASE ORAL at 05:33

## 2020-05-11 RX ADMIN — SPIRONOLACTONE 25 MILLIGRAM(S): 25 TABLET, FILM COATED ORAL at 05:37

## 2020-05-11 RX ADMIN — PANTOPRAZOLE SODIUM 40 MILLIGRAM(S): 20 TABLET, DELAYED RELEASE ORAL at 17:22

## 2020-05-11 RX ADMIN — Medication 100 MILLIGRAM(S): at 21:51

## 2020-05-11 RX ADMIN — GABAPENTIN 300 MILLIGRAM(S): 400 CAPSULE ORAL at 21:50

## 2020-05-11 RX ADMIN — Medication 10 MILLIGRAM(S): at 05:39

## 2020-05-11 RX ADMIN — BRIMONIDINE TARTRATE 1 DROP(S): 2 SOLUTION/ DROPS OPHTHALMIC at 06:16

## 2020-05-11 RX ADMIN — Medication 650 MILLIGRAM(S): at 14:40

## 2020-05-11 RX ADMIN — BRIMONIDINE TARTRATE 1 DROP(S): 2 SOLUTION/ DROPS OPHTHALMIC at 21:57

## 2020-05-11 NOTE — PROGRESS NOTE ADULT - ASSESSMENT
60M PMH HTN, DM + neuropathy, IVDU, ETOH liver cirrhosis, presents to [facility] s/p fall at home; found with toxic megacolon and pneumatosis; now s/p ex lap + subtotal colectomy, ileostomy (3/31). Hospital course further complicated by COVID, MEG, anemia (refused further GI workeup). Pt stable for discharge pending instatement of proper woundcare services.

## 2020-05-11 NOTE — PROGRESS NOTE ADULT - PROBLEM SELECTOR PLAN 1
non oliguric MEG, perfusional/ATN pic   renal function overall improving except for an increase in Cr level for the past 24 hours  - please check for urinary retention during the course of the day  - would repeat Urine Lytes, Urea/ Cr/Na and Osm most ly for hyponatremia for the pasty 2 days   - c/w Nabicarb 650 mg TID  Will follow

## 2020-05-11 NOTE — PROGRESS NOTE ADULT - PROBLEM SELECTOR PLAN 10
F: none  E: replete prn to K>4; Mg>2   N: low fiber, consistent carbs  GI ppx: protonix BID   DVT ppx: Sub Q heparin, DVT neg by ultrasound    DISPO pending establishment of proper woundcare upon discharge

## 2020-05-11 NOTE — PROGRESS NOTE ADULT - ASSESSMENT
61 y/o M with PMH of HTN, DM complicated by neuropathy, EtOH liver cirrhosis, presenting w/ pneumatosis of unknown etiology, s/p sub total colectomy and end ileostomy on 3/31, c/b fever and purulent wound infection (4/9) now s/p paracentesis 4/13 found to be COVID (+). Clinically stable. In no acute distress.     -c/w lomotil  -trend ostomy output/ ostomy site care  -dispo planning and care per medicine

## 2020-05-11 NOTE — PROGRESS NOTE ADULT - ASSESSMENT
A/p  59 y/o M with PMH of HTN, DM complicated by neuropathy, IVDU, ETOH liver cirrhosis admitted for toxic megacolon and pneumatosis and SARS-CoV-2 infection, underwent ex lap and subtotal colectomy with ileostomy creation, nephrology consulted for MEG

## 2020-05-11 NOTE — PROGRESS NOTE ADULT - ATTENDING COMMENTS
MEG had been resolving --unclear why creat alem today   recheck chem to confirm  watch for retention

## 2020-05-11 NOTE — PROGRESS NOTE ADULT - SUBJECTIVE AND OBJECTIVE BOX
SUBJECTIVE: Pt seen & examined at bedside by chief resident. THONG    Vital Signs Last 24 Hrs  T(C): 36.7 (11 May 2020 06:27), Max: 36.7 (11 May 2020 06:27)  T(F): 98.1 (11 May 2020 06:27), Max: 98.1 (11 May 2020 06:27)  HR: 70 (11 May 2020 06:27) (70 - 77)  BP: 166/77 (11 May 2020 06:27) (160/80 - 166/77)  BP(mean): --  RR: 18 (11 May 2020 06:27) (18 - 19)  SpO2: 98% (11 May 2020 06:27) (97% - 98%)    I&O's Detail    10 May 2020 07:01  -  11 May 2020 07:00  --------------------------------------------------------  IN:  Total IN: 0 mL    OUT:    Voided: 700 mL  Total OUT: 700 mL    Total NET: -700 mL    Physical Exam:  General: NAD  Pulmonary: Nonlabored breathing, no respiratory distress  Abdominal: Soft, nondistended, nontender to palpation, incision with appropriate granulation tissue, ostomy w/ large amounts of soft stool  Extremities: [facility]    05-11    132<L>  |  99  |  39<H>  ----------------------------<  162<H>  4.9   |  18<L>  |  2.09<H>    Ca    9.4      11 May 2020 06:10  Phos  4.9     05-11  Mg     1.4     05-11    TPro  7.8  /  Alb  3.4  /  TBili  0.2  /  DBili  x   /  AST  17  /  ALT  19  /  AlkPhos  103  05-11

## 2020-05-11 NOTE — PROGRESS NOTE ADULT - ATTENDING COMMENTS
Glucoses have been mostly in target range, though spiked to 254 last night (probably due to a post-supper "snack"--actually the equivalent of a meal), and dropped to 64 after breakfast today (due to insufficient carb intake at the meal).  To continue the current insulin regimen

## 2020-05-11 NOTE — PROGRESS NOTE ADULT - SUBJECTIVE AND OBJECTIVE BOX
INTERVAL HPI/OVERNIGHT EVENTS:    Patient is a 60y old  Male who presents with a chief complaint of toxic megacolon, COVID + (06 May 2020 10:01)      Pt reports the following symptoms:    CONSTITUTIONAL:  Negative fever or chills, feels well, good appetite  EYES:  Negative  blurry vision or double vision  CARDIOVASCULAR:  Negative for chest pain or palpitations  RESPIRATORY:  Negative for cough, wheezing, or SOB   GASTROINTESTINAL:  Negative for nausea, vomiting, diarrhea, constipation, or abdominal pain  GENITOURINARY:  Negative frequency, urgency or dysuria  NEUROLOGIC:  No headache, confusion, dizziness, lightheadedness    MEDICATIONS  (STANDING):  amLODIPine   Tablet 10 milliGRAM(s) Oral daily  atorvastatin 10 milliGRAM(s) Oral at bedtime  baclofen 2.5 milliGRAM(s) Oral every 12 hours  brimonidine 0.2% Ophthalmic Solution 1 Drop(s) Both EYES three times a day  chlorhexidine 2% Cloths 1 Application(s) Topical daily  dextrose 5%. 1000 milliLiter(s) (50 mL/Hr) IV Continuous <Continuous>  dextrose 50% Injectable 12.5 Gram(s) IV Push once  dextrose 50% Injectable 25 Gram(s) IV Push once  dextrose 50% Injectable 25 Gram(s) IV Push once  diphenoxylate/atropine 1 Tablet(s) Oral daily  dorzolamide 2% Ophthalmic Solution 1 Drop(s) Both EYES three times a day  furosemide    Tablet 10 milliGRAM(s) Oral daily  gabapentin 300 milliGRAM(s) Oral every 8 hours  heparin   Injectable 7500 Unit(s) SubCutaneous every 8 hours  insulin glargine Injectable (LANTUS) 14 Unit(s) SubCutaneous at bedtime  insulin lispro (HumaLOG) corrective regimen sliding scale   SubCutaneous at bedtime  insulin lispro (HumaLOG) corrective regimen sliding scale   SubCutaneous three times a day before meals  insulin lispro Injectable (HumaLOG) 6 Unit(s) SubCutaneous three times a day before meals  magnesium oxide 400 milliGRAM(s) Oral every 6 hours  pantoprazole    Tablet 40 milliGRAM(s) Oral every 12 hours  rifAXIMin 550 milliGRAM(s) Oral two times a day  sodium bicarbonate 650 milliGRAM(s) Oral three times a day  spironolactone 25 milliGRAM(s) Oral daily  traZODone 100 milliGRAM(s) Oral at bedtime    MEDICATIONS  (PRN):  acetaminophen   Tablet .. 500 milliGRAM(s) Oral every 6 hours PRN Mild Pain (1 - 3), Moderate Pain (4 - 6)  dextrose 40% Gel 15 Gram(s) Oral once PRN Blood Glucose LESS THAN 70 milliGRAM(s)/deciliter  glucagon  Injectable 1 milliGRAM(s) IntraMuscular once PRN Glucose LESS THAN 70 milligrams/deciliter  ondansetron    Tablet 4 milliGRAM(s) Oral every 8 hours PRN Nausea and/or Vomiting      PHYSICAL EXAM  Vital Signs Last 24 Hrs  T(C): 36.7 (11 May 2020 06:27), Max: 36.7 (11 May 2020 06:27)  T(F): 98.1 (11 May 2020 06:27), Max: 98.1 (11 May 2020 06:27)  HR: 70 (11 May 2020 06:27) (70 - 77)  BP: 166/77 (11 May 2020 06:27) (160/80 - 166/77)  BP(mean): --  RR: 18 (11 May 2020 06:27) (18 - 19)  SpO2: 98% (11 May 2020 06:27) (97% - 98%)    Constitutional: wn/wd in NAD.   HEENT: NCAT, MMM, OP clear, EOMI, no proptosis or lid retraction  Neck: no thyromegaly or palpable thyroid nodules   Respiratory: lungs CTAB.  Cardiovascular: regular rhythm, normal S1 and S2, no audible murmurs, no peripheral edema  GI: soft, NT/ND, no masses/HSM appreciated.  Neurology: no tremors, DTR 2+  Skin: no visible rashes/lesions  Psychiatric: AAO x 3, normal affect/mood.    LABS:                        8.2    8.30  )-----------( 278      ( 10 May 2020 08:31 )             25.5     05-11    132<L>  |  99  |  39<H>  ----------------------------<  162<H>  4.9   |  18<L>  |  2.09<H>    Ca    9.4      11 May 2020 06:10  Phos  4.9     05-11  Mg     1.4     05-11    TPro  7.8  /  Alb  3.4  /  TBili  0.2  /  DBili  x   /  AST  17  /  ALT  19  /  AlkPhos  103  05-11        Thyroid Stimulating Hormone, Serum: 3.104 uIU/mL (05-11 @ 06:10)  Thyroid Stimulating Hormone, Serum: 2.712 uIU/mL (05-07 @ 06:31)  Thyroid Stimulating Hormone, Serum: 3.063 uIU/mL (05-07 @ 06:31)  Thyroid Stimulating Hormone, Serum: 3.509 uIU/mL (05-03 @ 06:07)  Thyroid Stimulating Hormone, Serum: 3.325 uIU/mL (04-29 @ 06:17)  Thyroid Stimulating Hormone, Serum: 2.813 uIU/mL (04-14 @ 07:28)  Thyroid Stimulating Hormone, Serum: 2.877 uIU/mL (04-14 @ 00:34)      HbA1C: 6.9 % (04-05 @ 06:59)  7.6 % (03-31 @ 05:58)    CAPILLARY BLOOD GLUCOSE      POCT Blood Glucose.: 64 mg/dL (11 May 2020 11:49)  POCT Blood Glucose.: 165 mg/dL (11 May 2020 07:56)  POCT Blood Glucose.: 264 mg/dL (10 May 2020 21:34)  POCT Blood Glucose.: 151 mg/dL (10 May 2020 17:03)      Insulin Sliding Scale requirements X 24 Hours:    RADIOLOGY & ADDITIONAL TESTS:    A/P: 60y Male with history of DM type II presenting for       1.  DM -     Please continue           units lantus at bedtime  / in the morning and        units lispro with meals and lispro moderate / low dose sliding scale 4 times daily with meals and at bedtime.  Please continue consistent carbohydrate diet.      Goal FSG is   Will continue to monitor   For discharge, pt can continue    Pt can follow up at discharge with [facility] by calling  to make an appointment.   Will discuss case with     and update primary team INTERVAL HPI/OVERNIGHT EVENTS:    Patient is a 60y old  Male who presents with a chief complaint of toxic megacolon, COVID + (06 May 2020 10:01)  Spoke to patient via phone and the primary team taking care of the patient.  No acute events.  he is complaining that he is getting only small portions of food and was requesting more food.  he was started on lasix and aldactone - given the HTN and liver cirrhosis. Cr was 1.09 ( was 1.6 before)  Saturating well on RA. appetite is good.  TSh was 3.104 and free T4 was 0.86 on   FSg and insulin administration reviewed  FSG & Insulin received:  Yesterday:  pre-dinner fs, 6 nutritional lispro   units + 2  units lispro SS, had chicken, rice  bedtime fs, 14  lantus   units + 3   units lispro SS, had tune sandwich and juice as snack  Today:  pre-breakfast fs, 6 nutritional lispro   units+  2  units lispro SS, had eggs, 1 bread, sausage  pre-lunch fs - was given juice - repeat FSg was 167 ( was feeling weak - has been having dizziness on and off)      Pt reports the following symptoms:  CONSTITUTIONAL:  Negative fever or chills, feels well, good appetite  CARDIOVASCULAR:  Negative for chest pain or palpitations  RESPIRATORY:  Negative for cough, wheezing, or SOB   GASTROINTESTINAL:  Negative for nausea, vomiting, diarrhea, constipation, or abdominal pain  NEUROLOGIC:  No headache, confusion, dizziness, lightheadedness    MEDICATIONS  (STANDING):  amLODIPine   Tablet 10 milliGRAM(s) Oral daily  atorvastatin 10 milliGRAM(s) Oral at bedtime  baclofen 2.5 milliGRAM(s) Oral every 12 hours  brimonidine 0.2% Ophthalmic Solution 1 Drop(s) Both EYES three times a day  chlorhexidine 2% Cloths 1 Application(s) Topical daily  dextrose 5%. 1000 milliLiter(s) (50 mL/Hr) IV Continuous <Continuous>  dextrose 50% Injectable 12.5 Gram(s) IV Push once  dextrose 50% Injectable 25 Gram(s) IV Push once  dextrose 50% Injectable 25 Gram(s) IV Push once  diphenoxylate/atropine 1 Tablet(s) Oral daily  dorzolamide 2% Ophthalmic Solution 1 Drop(s) Both EYES three times a day  furosemide    Tablet 10 milliGRAM(s) Oral daily  gabapentin 300 milliGRAM(s) Oral every 8 hours  heparin   Injectable 7500 Unit(s) SubCutaneous every 8 hours  insulin glargine Injectable (LANTUS) 14 Unit(s) SubCutaneous at bedtime  insulin lispro (HumaLOG) corrective regimen sliding scale   SubCutaneous at bedtime  insulin lispro (HumaLOG) corrective regimen sliding scale   SubCutaneous three times a day before meals  insulin lispro Injectable (HumaLOG) 6 Unit(s) SubCutaneous three times a day before meals  magnesium oxide 400 milliGRAM(s) Oral every 6 hours  pantoprazole    Tablet 40 milliGRAM(s) Oral every 12 hours  rifAXIMin 550 milliGRAM(s) Oral two times a day  sodium bicarbonate 650 milliGRAM(s) Oral three times a day  spironolactone 25 milliGRAM(s) Oral daily  traZODone 100 milliGRAM(s) Oral at bedtime    MEDICATIONS  (PRN):  acetaminophen   Tablet .. 500 milliGRAM(s) Oral every 6 hours PRN Mild Pain (1 - 3), Moderate Pain (4 - 6)  dextrose 40% Gel 15 Gram(s) Oral once PRN Blood Glucose LESS THAN 70 milliGRAM(s)/deciliter  glucagon  Injectable 1 milliGRAM(s) IntraMuscular once PRN Glucose LESS THAN 70 milligrams/deciliter  ondansetron    Tablet 4 milliGRAM(s) Oral every 8 hours PRN Nausea and/or Vomiting      PHYSICAL EXAM  Vital Signs Last 24 Hrs  T(C): 36.7 (11 May 2020 06:27), Max: 36.7 (11 May 2020 06:27)  T(F): 98.1 (11 May 2020 06:27), Max: 98.1 (11 May 2020 06:27)  HR: 70 (11 May 2020 06:27) (70 - 77)  BP: 166/77 (11 May 2020 06:27) (160/80 - 166/77)  BP(mean): --  RR: 18 (11 May 2020 06:27) (18 - 19)  SpO2: 98% (11 May 2020 06:27) (97% - 98%)    Due to the nature of this patient’s COVID-19 isolation status (either confirmed or suspected), this note was prepared without a bedside physical examination to prevent spread of infection and to conserve personal protective equipment during this nationwide pandemic. If possible, direct patient communication occurred via electronic measures or telephone conversation. Examination highlights were provided by a bedside nurse wearing personal protective equipment and review of pertinent medical records. Objective data (vital signs, urine output, lab results, imaging studies, medications, etc) were reviewed in detail. Face to face visits and physical examination have been limited only to patients for whom it is required for medical decision making.    LABS:                        8.2    8.30  )-----------( 278      ( 10 May 2020 08:31 )             25.5         132<L>  |  99  |  39<H>  ----------------------------<  162<H>  4.9   |  18<L>  |  2.09<H>    Ca    9.4      11 May 2020 06:10  Phos  4.9       Mg     1.4         TPro  7.8  /  Alb  3.4  /  TBili  0.2  /  DBili  x   /  AST  17  /  ALT  19  /  AlkPhos  103          Thyroid Stimulating Hormone, Serum: 3.104 uIU/mL ( @ 06:10)  Thyroid Stimulating Hormone, Serum: 2.712 uIU/mL ( @ 06:31)  Thyroid Stimulating Hormone, Serum: 3.063 uIU/mL ( @ 06:31)  Thyroid Stimulating Hormone, Serum: 3.509 uIU/mL ( @ 06:07)  Thyroid Stimulating Hormone, Serum: 3.325 uIU/mL ( @ 06:17)  Thyroid Stimulating Hormone, Serum: 2.813 uIU/mL ( @ 07:28)  Thyroid Stimulating Hormone, Serum: 2.877 uIU/mL ( @ 00:34)      HbA1C: 6.9 % ( @ 06:59)  7.6 % ( @ 05:58)    CAPILLARY BLOOD GLUCOSE      POCT Blood Glucose.: 64 mg/dL (11 May 2020 11:49)  POCT Blood Glucose.: 165 mg/dL (11 May 2020 07:56)  POCT Blood Glucose.: 264 mg/dL (10 May 2020 21:34)  POCT Blood Glucose.: 151 mg/dL (10 May 2020 17:03)      Insulin Sliding Scale requirements X 24 Hours:    RADIOLOGY & ADDITIONAL TESTS:    A/P 60yMale with hx of DM now with COVID, worsening renal function, moderate hyperglycemia.     1.  DM: type 2  Please continue Lantus 14 units at bedtime  PLease continue Lispro 6  units tid with meals.   Continue lispro moderate dose scale with meals and at bedtime.   Continue consistent carb diet  FSG Goal 100-180  GFR 40 and EF 65%    2.  Hyperlipidemia - goal LDL < 70  - on lipitor 10 mg once daily    3.  Obesity - outpatient medical management.      4.  Hypothyroidism   - TSH on admission 2.8.   - levothyroxine 50mcg discontinued on  to assess necessity as there is no evidence of autoimmune hypothyroidism at this time.   - NO levothyroxine at this time.   - Repeat TSH 3.32 and free T4 1.03 ().   - TSH 3.5 and Free T4 1.01 on 5/3   - TSh was 2.712 and free T4 was 0.98 on   TSh was 3.104 and free T4 was 0.86 on     For discharge, metformin 1000mg BID and glimepiride 1mg before breakfast.    Case seen and discussed with  and updated the primary team.  Pt can follow up at discharge with [facility] by calling  to make an appointment.

## 2020-05-11 NOTE — PROGRESS NOTE ADULT - SUBJECTIVE AND OBJECTIVE BOX
O/N Events:  LUCA  Subjective:  denies any complaints, Cr increased to 2 from 1.6/ BUN unchanged/ bladder scan with 300 PVR  recorded  with/Mg 1.4    VITALS  Vital Signs Last 24 Hrs  T(C): 36.7 (11 May 2020 06:27), Max: 36.7 (11 May 2020 06:27)  T(F): 98.1 (11 May 2020 06:27), Max: 98.1 (11 May 2020 06:27)  HR: 70 (11 May 2020 06:27) (70 - 77)  BP: 166/77 (11 May 2020 06:27) (160/80 - 166/77)  RR: 18 (11 May 2020 06:27) (18 - 19)  SpO2: 98% (11 May 2020 06:27) (97% - 98%)    PHYSICAL EXAM  General: A&Ox 3; NAD  Neck: no JVD  Respiratory: CTA B/L  Cardiovascular: Regular rhythm/rate; S1/S2  Gastrointestinal: Soft; NTND, mid abdo surgical incision dressing c/d/i, ostomy bag with semisolid brown stool   Extremities: WWP; no edema  Neurological:  CNII-XII grossly intact; no obvious focal deficits    MEDICATIONS  (STANDING):  amLODIPine   Tablet 10 milliGRAM(s) Oral daily  atorvastatin 10 milliGRAM(s) Oral at bedtime  baclofen 2.5 milliGRAM(s) Oral every 12 hours  brimonidine 0.2% Ophthalmic Solution 1 Drop(s) Both EYES three times a day  chlorhexidine 2% Cloths 1 Application(s) Topical daily  dextrose 5%. 1000 milliLiter(s) (50 mL/Hr) IV Continuous <Continuous>  dextrose 50% Injectable 12.5 Gram(s) IV Push once  dextrose 50% Injectable 25 Gram(s) IV Push once  dextrose 50% Injectable 25 Gram(s) IV Push once  diphenoxylate/atropine 1 Tablet(s) Oral daily  dorzolamide 2% Ophthalmic Solution 1 Drop(s) Both EYES three times a day  furosemide    Tablet 10 milliGRAM(s) Oral daily  gabapentin 300 milliGRAM(s) Oral every 8 hours  heparin   Injectable 7500 Unit(s) SubCutaneous every 8 hours  insulin glargine Injectable (LANTUS) 14 Unit(s) SubCutaneous at bedtime  insulin lispro (HumaLOG) corrective regimen sliding scale   SubCutaneous at bedtime  insulin lispro (HumaLOG) corrective regimen sliding scale   SubCutaneous three times a day before meals  insulin lispro Injectable (HumaLOG) 6 Unit(s) SubCutaneous three times a day before meals  magnesium oxide 400 milliGRAM(s) Oral every 6 hours  pantoprazole    Tablet 40 milliGRAM(s) Oral every 12 hours  rifAXIMin 550 milliGRAM(s) Oral two times a day  sodium bicarbonate 650 milliGRAM(s) Oral three times a day  spironolactone 25 milliGRAM(s) Oral daily  traZODone 100 milliGRAM(s) Oral at bedtime    MEDICATIONS  (PRN):  acetaminophen   Tablet .. 500 milliGRAM(s) Oral every 6 hours PRN Mild Pain (1 - 3), Moderate Pain (4 - 6)  dextrose 40% Gel 15 Gram(s) Oral once PRN Blood Glucose LESS THAN 70 milliGRAM(s)/deciliter  glucagon  Injectable 1 milliGRAM(s) IntraMuscular once PRN Glucose LESS THAN 70 milligrams/deciliter  ondansetron    Tablet 4 milliGRAM(s) Oral every 8 hours PRN Nausea and/or Vomiting      LABS                        8.2    8.30  )-----------( 278      ( 10 May 2020 08:31 )             25.5     05-11    132<L>  |  99  |  39<H>  ----------------------------<  162<H>  4.9   |  18<L>  |  2.09<H>    Ca    9.4      11 May 2020 06:10  Phos  4.9     05-11  Mg     1.4     05-11    TPro  7.8  /  Alb  3.4  /  TBili  0.2  /  DBili  x   /  AST  17  /  ALT  19  /  AlkPhos  103  05-11    LIVER FUNCTIONS - ( 11 May 2020 06:10 )  Alb: 3.4 g/dL / Pro: 7.8 g/dL / ALK PHOS: 103 U/L / ALT: 19 U/L / AST: 17 U/L / GGT: x

## 2020-05-11 NOTE — PROGRESS NOTE ADULT - SUBJECTIVE AND OBJECTIVE BOX
OVERNIGHT EVENTS: No acute events overnight.  SUBJECTIVE: Patient seen and examined at the bedside. Patient denies new complaints at this time.   12-point ROS reviewed and is otherwise negative.    Vital Signs Last 12 Hrs  T(F): 98.1 (05-11-20 @ 06:27), Max: 98.1 (05-11-20 @ 06:27)  HR: 70 (05-11-20 @ 06:27) (70 - 70)  BP: 166/77 (05-11-20 @ 06:27) (166/77 - 166/77)  BP(mean): --  RR: 18 (05-11-20 @ 06:27) (18 - 18)  SpO2: 98% (05-11-20 @ 06:27) (98% - 98%)    I&O's Summary    10 May 2020 07:01  -  11 May 2020 07:00  --------------------------------------------------------  IN: 0 mL / OUT: 700 mL / NET: -700 mL    PHYSICAL EXAM:  General: NAD, resting comfortably in bed  HEENT: NCAT, EOMI, MMM  Neck: supple  Respiratory: CTA, normal respiratory rate/depth/effort  Cardiovascular: normal S1/S2, RRR, no MRG  Vascular: 2+ radial/DP pulses b/l  Abdomen: NT, soft, distended, ileostomy output nonbloody; midline wound dressing c/d/i  Extremities: WWP, no edema  Neuro: AAOx3    LABS:                        8.2    8.30  )-----------( 278      ( 10 May 2020 08:31 )             25.5     05-10    131<L>  |  97  |  38<H>  ----------------------------<  104<H>  4.3   |  20<L>  |  1.62<H>    Ca    9.8      10 May 2020 06:59  Phos  5.1     05-10  Mg     1.5     05-10    RADIOLOGY & ADDITIONAL TESTS: Reviewed    MEDICATIONS  (STANDING):  amLODIPine   Tablet 10 milliGRAM(s) Oral daily  atorvastatin 10 milliGRAM(s) Oral at bedtime  baclofen 2.5 milliGRAM(s) Oral every 12 hours  brimonidine 0.2% Ophthalmic Solution 1 Drop(s) Both EYES three times a day  chlorhexidine 2% Cloths 1 Application(s) Topical daily  dextrose 5%. 1000 milliLiter(s) (50 mL/Hr) IV Continuous <Continuous>  dextrose 50% Injectable 12.5 Gram(s) IV Push once  dextrose 50% Injectable 25 Gram(s) IV Push once  dextrose 50% Injectable 25 Gram(s) IV Push once  diphenoxylate/atropine 1 Tablet(s) Oral daily  dorzolamide 2% Ophthalmic Solution 1 Drop(s) Both EYES three times a day  furosemide    Tablet 10 milliGRAM(s) Oral daily  gabapentin 300 milliGRAM(s) Oral every 8 hours  heparin   Injectable 7500 Unit(s) SubCutaneous every 8 hours  insulin glargine Injectable (LANTUS) 14 Unit(s) SubCutaneous at bedtime  insulin lispro (HumaLOG) corrective regimen sliding scale   SubCutaneous at bedtime  insulin lispro (HumaLOG) corrective regimen sliding scale   SubCutaneous three times a day before meals  insulin lispro Injectable (HumaLOG) 6 Unit(s) SubCutaneous three times a day before meals  pantoprazole    Tablet 40 milliGRAM(s) Oral every 12 hours  rifAXIMin 550 milliGRAM(s) Oral two times a day  sodium bicarbonate 650 milliGRAM(s) Oral three times a day  spironolactone 25 milliGRAM(s) Oral daily  traZODone 100 milliGRAM(s) Oral at bedtime    MEDICATIONS  (PRN):  acetaminophen   Tablet .. 500 milliGRAM(s) Oral every 6 hours PRN Mild Pain (1 - 3), Moderate Pain (4 - 6)  dextrose 40% Gel 15 Gram(s) Oral once PRN Blood Glucose LESS THAN 70 milliGRAM(s)/deciliter  glucagon  Injectable 1 milliGRAM(s) IntraMuscular once PRN Glucose LESS THAN 70 milligrams/deciliter  ondansetron    Tablet 4 milliGRAM(s) Oral every 8 hours PRN Nausea and/or Vomiting

## 2020-05-11 NOTE — PROGRESS NOTE ADULT - PROBLEM SELECTOR PLAN 2
decompensated cirrhosis c/b ascites likely 2/2 alcohol abuse  -SBP ruled out, hepatic encephalopathy poa now resolved  -CT abd/pelvis with persistent large ascites and peritonitis; bilateral pleural effusions and pericardial effusion  -paracentesis on 4/10 with negative cytology  -repeat paracentesis 4/19, unable to aspirate significant amount of fluid   -abd u/s 5/3 small ascites   -Rifaximin 550mg bid for hepatic encephalopathy   -Lasix 10mg po qd and Spironolactone 25mg po qd for ascites  -holding Lactulose given high ostomy output  -pt refused video capsule endoscopy this admission   -maximum Tylenol 2g qd  -screening for HCC outpatient with hepatology follow-up

## 2020-05-12 LAB
GLUCOSE BLDC GLUCOMTR-MCNC: 117 MG/DL — HIGH (ref 70–99)
GLUCOSE BLDC GLUCOMTR-MCNC: 120 MG/DL — HIGH (ref 70–99)
GLUCOSE BLDC GLUCOMTR-MCNC: 150 MG/DL — HIGH (ref 70–99)
GLUCOSE BLDC GLUCOMTR-MCNC: 177 MG/DL — HIGH (ref 70–99)

## 2020-05-12 PROCEDURE — 99231 SBSQ HOSP IP/OBS SF/LOW 25: CPT | Mod: GC

## 2020-05-12 PROCEDURE — 99231 SBSQ HOSP IP/OBS SF/LOW 25: CPT

## 2020-05-12 RX ORDER — SPIRONOLACTONE 25 MG/1
50 TABLET, FILM COATED ORAL DAILY
Refills: 0 | Status: DISCONTINUED | OUTPATIENT
Start: 2020-05-13 | End: 2020-05-15

## 2020-05-12 RX ORDER — DIPHENOXYLATE HCL/ATROPINE 2.5-.025MG
1 TABLET ORAL DAILY
Refills: 0 | Status: DISCONTINUED | OUTPATIENT
Start: 2020-05-12 | End: 2020-05-19

## 2020-05-12 RX ORDER — PETROLATUM,WHITE
1 JELLY (GRAM) TOPICAL
Refills: 0 | Status: DISCONTINUED | OUTPATIENT
Start: 2020-05-12 | End: 2020-06-05

## 2020-05-12 RX ORDER — FUROSEMIDE 40 MG
10 TABLET ORAL ONCE
Refills: 0 | Status: COMPLETED | OUTPATIENT
Start: 2020-05-12 | End: 2020-05-12

## 2020-05-12 RX ORDER — FUROSEMIDE 40 MG
20 TABLET ORAL DAILY
Refills: 0 | Status: DISCONTINUED | OUTPATIENT
Start: 2020-05-13 | End: 2020-05-15

## 2020-05-12 RX ADMIN — INSULIN GLARGINE 14 UNIT(S): 100 INJECTION, SOLUTION SUBCUTANEOUS at 22:15

## 2020-05-12 RX ADMIN — Medication 10 MILLIGRAM(S): at 12:30

## 2020-05-12 RX ADMIN — Medication 650 MILLIGRAM(S): at 12:34

## 2020-05-12 RX ADMIN — BRIMONIDINE TARTRATE 1 DROP(S): 2 SOLUTION/ DROPS OPHTHALMIC at 22:31

## 2020-05-12 RX ADMIN — BRIMONIDINE TARTRATE 1 DROP(S): 2 SOLUTION/ DROPS OPHTHALMIC at 12:23

## 2020-05-12 RX ADMIN — HEPARIN SODIUM 7500 UNIT(S): 5000 INJECTION INTRAVENOUS; SUBCUTANEOUS at 22:14

## 2020-05-12 RX ADMIN — Medication 650 MILLIGRAM(S): at 22:12

## 2020-05-12 RX ADMIN — DORZOLAMIDE HYDROCHLORIDE 1 DROP(S): 20 SOLUTION/ DROPS OPHTHALMIC at 22:32

## 2020-05-12 RX ADMIN — Medication 6 UNIT(S): at 08:51

## 2020-05-12 RX ADMIN — PANTOPRAZOLE SODIUM 40 MILLIGRAM(S): 20 TABLET, DELAYED RELEASE ORAL at 05:54

## 2020-05-12 RX ADMIN — Medication 500 MILLIGRAM(S): at 18:40

## 2020-05-12 RX ADMIN — HEPARIN SODIUM 7500 UNIT(S): 5000 INJECTION INTRAVENOUS; SUBCUTANEOUS at 12:34

## 2020-05-12 RX ADMIN — SPIRONOLACTONE 25 MILLIGRAM(S): 25 TABLET, FILM COATED ORAL at 05:51

## 2020-05-12 RX ADMIN — Medication 10 MILLIGRAM(S): at 05:49

## 2020-05-12 RX ADMIN — PANTOPRAZOLE SODIUM 40 MILLIGRAM(S): 20 TABLET, DELAYED RELEASE ORAL at 17:28

## 2020-05-12 RX ADMIN — DORZOLAMIDE HYDROCHLORIDE 1 DROP(S): 20 SOLUTION/ DROPS OPHTHALMIC at 12:36

## 2020-05-12 RX ADMIN — Medication 500 MILLIGRAM(S): at 06:04

## 2020-05-12 RX ADMIN — Medication 2.5 MILLIGRAM(S): at 17:31

## 2020-05-12 RX ADMIN — Medication 650 MILLIGRAM(S): at 05:49

## 2020-05-12 RX ADMIN — Medication 6 UNIT(S): at 12:31

## 2020-05-12 RX ADMIN — Medication 100 MILLIGRAM(S): at 22:16

## 2020-05-12 RX ADMIN — Medication 500 MILLIGRAM(S): at 12:35

## 2020-05-12 RX ADMIN — AMLODIPINE BESYLATE 10 MILLIGRAM(S): 2.5 TABLET ORAL at 05:48

## 2020-05-12 RX ADMIN — Medication 2: at 22:26

## 2020-05-12 RX ADMIN — GABAPENTIN 300 MILLIGRAM(S): 400 CAPSULE ORAL at 22:19

## 2020-05-12 RX ADMIN — GABAPENTIN 300 MILLIGRAM(S): 400 CAPSULE ORAL at 05:48

## 2020-05-12 RX ADMIN — Medication 6 UNIT(S): at 17:27

## 2020-05-12 RX ADMIN — Medication 1 TABLET(S): at 12:32

## 2020-05-12 RX ADMIN — CHLORHEXIDINE GLUCONATE 1 APPLICATION(S): 213 SOLUTION TOPICAL at 12:29

## 2020-05-12 RX ADMIN — Medication 2.5 MILLIGRAM(S): at 05:50

## 2020-05-12 RX ADMIN — ATORVASTATIN CALCIUM 10 MILLIGRAM(S): 80 TABLET, FILM COATED ORAL at 22:25

## 2020-05-12 RX ADMIN — GABAPENTIN 300 MILLIGRAM(S): 400 CAPSULE ORAL at 12:32

## 2020-05-12 RX ADMIN — DORZOLAMIDE HYDROCHLORIDE 1 DROP(S): 20 SOLUTION/ DROPS OPHTHALMIC at 05:58

## 2020-05-12 RX ADMIN — Medication 1 APPLICATION(S): at 17:27

## 2020-05-12 RX ADMIN — BRIMONIDINE TARTRATE 1 DROP(S): 2 SOLUTION/ DROPS OPHTHALMIC at 05:55

## 2020-05-12 RX ADMIN — HEPARIN SODIUM 7500 UNIT(S): 5000 INJECTION INTRAVENOUS; SUBCUTANEOUS at 05:51

## 2020-05-12 NOTE — PROGRESS NOTE ADULT - ASSESSMENT
60M PMH HTN, DM + neuropathy, IVDU, ETOH liver cirrhosis, presents to [facility] s/p fall at home; found with toxic megacolon and pneumatosis; now s/p ex lap + subtotal colectomy, ileostomy (3/31). Hospital course further complicated by COVID, MEG, anemia (refused further GI workeup). Pt stable for discharge pending instatement of proper woundcare services.

## 2020-05-12 NOTE — PROGRESS NOTE ADULT - ATTENDING COMMENTS
Glucoses were elevated to the low 200 range late yesterday, but decreased to 120-150 today.  Will therefore continue the current insulin regimen for now.  Still plan to discharge on oral agents

## 2020-05-12 NOTE — PROGRESS NOTE ADULT - SUBJECTIVE AND OBJECTIVE BOX
OVERNIGHT EVENTS: No acute events overnight.  SUBJECTIVE: Patient seen and examined at the bedside. Patient denies new complaints at this time; 12-point ROS reviewed and is otherwise negative.    Vital Signs Last 12 Hrs  T(F): 98.1 (05-12-20 @ 06:16), Max: 98.1 (05-12-20 @ 06:16)  HR: 71 (05-12-20 @ 06:16) (71 - 73)  BP: 157/71 (05-12-20 @ 06:16) (155/- - 157/71)  BP(mean): --  RR: 18 (05-12-20 @ 06:16) (17 - 18)  SpO2: 100% (05-12-20 @ 06:16) (97% - 100%)    I&O's Summary    11 May 2020 07:01  -  12 May 2020 07:00  --------------------------------------------------------  IN: 0 mL / OUT: 1900 mL / NET: -1900 mL    PHYSICAL EXAM:  General: NAD, resting comfortably in bed  HEENT: NCAT, EOMI, MMM  Neck: supple  Respiratory: CTA, normal respiratory rate/depth/effort  Cardiovascular: normal S1/S2, RRR, no MRG  Vascular: 2+ radial/DP pulses b/l  Abdomen: NT, soft, distended, ileostomy output nonbloody; midline wound dressing c/d/i  Extremities: WWP, no edema  Neuro: AAOx3    LABS:                        8.2    8.30  )-----------( 278      ( 10 May 2020 08:31 )             25.5     05-11    132<L>  |  99  |  39<H>  ----------------------------<  162<H>  4.9   |  18<L>  |  2.09<H>    Ca    9.4      11 May 2020 06:10  Phos  4.9     05-11  Mg     1.4     05-11    TPro  7.8  /  Alb  3.4  /  TBili  0.2  /  DBili  x   /  AST  17  /  ALT  19  /  AlkPhos  103  05-11      RADIOLOGY & ADDITIONAL TESTS: Reviewed    MEDICATIONS  (STANDING):  amLODIPine   Tablet 10 milliGRAM(s) Oral daily  atorvastatin 10 milliGRAM(s) Oral at bedtime  baclofen 2.5 milliGRAM(s) Oral every 12 hours  brimonidine 0.2% Ophthalmic Solution 1 Drop(s) Both EYES three times a day  chlorhexidine 2% Cloths 1 Application(s) Topical daily  dextrose 5%. 1000 milliLiter(s) (50 mL/Hr) IV Continuous <Continuous>  dextrose 50% Injectable 12.5 Gram(s) IV Push once  dextrose 50% Injectable 25 Gram(s) IV Push once  dextrose 50% Injectable 25 Gram(s) IV Push once  diphenoxylate/atropine 1 Tablet(s) Oral daily  dorzolamide 2% Ophthalmic Solution 1 Drop(s) Both EYES three times a day  furosemide    Tablet 10 milliGRAM(s) Oral daily  gabapentin 300 milliGRAM(s) Oral every 8 hours  heparin   Injectable 7500 Unit(s) SubCutaneous every 8 hours  insulin glargine Injectable (LANTUS) 14 Unit(s) SubCutaneous at bedtime  insulin lispro (HumaLOG) corrective regimen sliding scale   SubCutaneous Before meals and at bedtime  insulin lispro Injectable (HumaLOG) 6 Unit(s) SubCutaneous three times a day before meals  pantoprazole    Tablet 40 milliGRAM(s) Oral every 12 hours  rifAXIMin 550 milliGRAM(s) Oral two times a day  sodium bicarbonate 650 milliGRAM(s) Oral three times a day  spironolactone 25 milliGRAM(s) Oral daily  traZODone 100 milliGRAM(s) Oral at bedtime    MEDICATIONS  (PRN):  acetaminophen   Tablet .. 500 milliGRAM(s) Oral every 6 hours PRN Mild Pain (1 - 3), Moderate Pain (4 - 6)  dextrose 40% Gel 15 Gram(s) Oral once PRN Blood Glucose LESS THAN 70 milliGRAM(s)/deciliter  glucagon  Injectable 1 milliGRAM(s) IntraMuscular once PRN Glucose LESS THAN 70 milligrams/deciliter  ondansetron    Tablet 4 milliGRAM(s) Oral every 8 hours PRN Nausea and/or Vomiting

## 2020-05-12 NOTE — PROGRESS NOTE ADULT - SUBJECTIVE AND OBJECTIVE BOX
SUBJECTIVE: Afebrile, HD stable-tolerating diet without nausea/emesis, ileostomy functioning well with watery brown output.     amLODIPine   Tablet 10 milliGRAM(s) Oral daily  furosemide    Tablet 10 milliGRAM(s) Oral once  heparin   Injectable 7500 Unit(s) SubCutaneous every 8 hours  rifAXIMin 550 milliGRAM(s) Oral two times a day      Vital Signs Last 24 Hrs  T(C): 36.7 (12 May 2020 06:16), Max: 36.7 (12 May 2020 06:16)  T(F): 98.1 (12 May 2020 06:16), Max: 98.1 (12 May 2020 06:16)  HR: 71 (12 May 2020 06:16) (64 - 73)  BP: 157/71 (12 May 2020 06:16) (150/76 - 157/71)  BP(mean): --  RR: 18 (12 May 2020 06:16) (16 - 18)  SpO2: 100% (12 May 2020 06:16) (97% - 100%)    General: NAD, resting comfortably  Neuro: AAOX3, EOMI, PERRLA, no scleral icterus  Pulm: CTAB, Nonlabored breathing  CV: S1/S2 normal, no murmurs  Abdomen: soft, minimal distention, nontender, no rebound, no guarding. Ileostomy healthy-appearing with 300cc brown watery output, site c/d/i without evidence of bleeding.  Extremities: WWP, No LE edema b/l        LABS:    05-11    132<L>  |  99  |  39<H>  ----------------------------<  162<H>  4.9   |  18<L>  |  2.09<H>    Ca    9.4      11 May 2020 06:10  Phos  4.9     05-11  Mg     1.4     05-11    TPro  7.8  /  Alb  3.4  /  TBili  0.2  /  DBili  x   /  AST  17  /  ALT  19  /  AlkPhos  103  05-11

## 2020-05-12 NOTE — PROGRESS NOTE ADULT - ASSESSMENT
A/p  61 y/o M with PMH of HTN, DM complicated by neuropathy, IVDU, ETOH liver cirrhosis admitted for toxic megacolon and pneumatosis and SARS-CoV-2 infection, underwent ex lap and subtotal colectomy with ileostomy creation, nephrology consulted for MEG

## 2020-05-12 NOTE — PROGRESS NOTE ADULT - ATTENDING COMMENTS
Patient seen and examined.   No complaints, waiting to go to "hotel."  Tolerating diet.  AFVSS  Abd soft, mild distention, NT.  Midline wound with granulation, one area of fibrinous exudate deeper inside at fascia.  Ileostomy p/p/p, protruding slightly.  Output 400cc/24h    A/P: 6 weeks s/p STC for fulminant colitis. Convalescing from COVID.  1. Continue diet  2. Lomotil once per day  3. Dressing changes TIW  4. Awaiting disposition planning.

## 2020-05-12 NOTE — PROGRESS NOTE ADULT - PROBLEM SELECTOR PLAN 1
4/12 COVID+   -s/p hydroxychloroquine + azithromycin 4/13-4/16  -stable respiratory status on room air, continue to monitor respiratory status  -continue supportive care  -contact/droplet precautions

## 2020-05-12 NOTE — PROGRESS NOTE ADULT - SUBJECTIVE AND OBJECTIVE BOX
O/N Events:  LUCA  Subjective:  denies acute complaints, labs not drawn yet, UOP 1.5 L and ostomy output 400 for the past 24 hours     VITALS  Vital Signs Last 24 Hrs  T(C): 36.7 (12 May 2020 06:16), Max: 36.7 (12 May 2020 06:16)  T(F): 98.1 (12 May 2020 06:16), Max: 98.1 (12 May 2020 06:16)  HR: 71 (12 May 2020 06:16) (71 - 73)  BP: 157/71 (12 May 2020 06:16) (155/- - 157/71)  RR: 18 (12 May 2020 06:16) (17 - 18)  SpO2: 100% (12 May 2020 06:16) (97% - 100%)    PHYSICAL EXAM  General: A&Ox 3; NAD  Neck: no JVD  Respiratory: CTA B/L  Cardiovascular: Regular rhythm/rate; S1/S2  Gastrointestinal: Soft; NTND, mid abdo surgical incision dressing c/d/i, ostomy bag with semisolid brown stool   Extremities: WWP; no edema  Neurological:  CNII-XII grossly intact; no obvious focal deficits    MEDICATIONS  (STANDING):  amLODIPine   Tablet 10 milliGRAM(s) Oral daily  atorvastatin 10 milliGRAM(s) Oral at bedtime  baclofen 2.5 milliGRAM(s) Oral every 12 hours  brimonidine 0.2% Ophthalmic Solution 1 Drop(s) Both EYES three times a day  chlorhexidine 2% Cloths 1 Application(s) Topical daily  dextrose 5%. 1000 milliLiter(s) (50 mL/Hr) IV Continuous <Continuous>  dextrose 50% Injectable 12.5 Gram(s) IV Push once  dextrose 50% Injectable 25 Gram(s) IV Push once  dextrose 50% Injectable 25 Gram(s) IV Push once  diphenoxylate/atropine 1 Tablet(s) Oral daily  dorzolamide 2% Ophthalmic Solution 1 Drop(s) Both EYES three times a day  furosemide    Tablet 10 milliGRAM(s) Oral once  gabapentin 300 milliGRAM(s) Oral every 8 hours  heparin   Injectable 7500 Unit(s) SubCutaneous every 8 hours  insulin glargine Injectable (LANTUS) 14 Unit(s) SubCutaneous at bedtime  insulin lispro (HumaLOG) corrective regimen sliding scale   SubCutaneous Before meals and at bedtime  insulin lispro Injectable (HumaLOG) 6 Unit(s) SubCutaneous three times a day before meals  pantoprazole    Tablet 40 milliGRAM(s) Oral every 12 hours  rifAXIMin 550 milliGRAM(s) Oral two times a day  sodium bicarbonate 650 milliGRAM(s) Oral three times a day  traZODone 100 milliGRAM(s) Oral at bedtime    MEDICATIONS  (PRN):  acetaminophen   Tablet .. 500 milliGRAM(s) Oral every 6 hours PRN Mild Pain (1 - 3), Moderate Pain (4 - 6)  dextrose 40% Gel 15 Gram(s) Oral once PRN Blood Glucose LESS THAN 70 milliGRAM(s)/deciliter  glucagon  Injectable 1 milliGRAM(s) IntraMuscular once PRN Glucose LESS THAN 70 milligrams/deciliter  ondansetron    Tablet 4 milliGRAM(s) Oral every 8 hours PRN Nausea and/or Vomiting      LABS    05-11    132<L>  |  99  |  39<H>  ----------------------------<  162<H>  4.9   |  18<L>  |  2.09<H>    Ca    9.4      11 May 2020 06:10  Phos  4.9     05-11  Mg     1.4     05-11    TPro  7.8  /  Alb  3.4  /  TBili  0.2  /  DBili  x   /  AST  17  /  ALT  19  /  AlkPhos  103  05-11    LIVER FUNCTIONS - ( 11 May 2020 06:10 )  Alb: 3.4 g/dL / Pro: 7.8 g/dL / ALK PHOS: 103 U/L / ALT: 19 U/L / AST: 17 U/L / GGT: x

## 2020-05-12 NOTE — PROGRESS NOTE ADULT - PROBLEM SELECTOR PLAN 3
stated now s/p 3/31/20 ex lap, subtotal colectomy+ileostomy  course c/b fever and purulent wound infection (4/9), s/p vanc/zosyn.   -s/p laparotomy, no longer with wound vac, daily dressings  -ileostomy in place draining brown stool with no signs of bleeding  -lomotil- low dose to prevent constipation/ hepatic encephalopthy  -general surgery following, appreciate recs  -woundcare

## 2020-05-12 NOTE — PROGRESS NOTE ADULT - ATTENDING COMMENTS
patient doing well today  a bit hypertensive, will increase spironolactone and lasix  sutures healing well  surrounding dryness - will give cream    otherwise, no complaints today. no labs    discharge pending placement

## 2020-05-12 NOTE — PROGRESS NOTE ADULT - PROBLEM SELECTOR PLAN 1
non oliguric MEG, perfusional/ATN pic   renal function overall improving had a sl bump in Cr level yesterday   - c/w Nabicarb 650 mg TID for now  Will follow   - please obtain BMP w/lytes to monitor Renal Fx trend

## 2020-05-12 NOTE — PROGRESS NOTE ADULT - SUBJECTIVE AND OBJECTIVE BOX
INTERVAL HPI/OVERNIGHT EVENTS:    Patient is a 60y old  Male who presents with a chief complaint of toxic megacolon, COVID + (06 May 2020 10:01)      Pt reports the following symptoms:    CONSTITUTIONAL:  Negative fever or chills, feels well, good appetite  EYES:  Negative  blurry vision or double vision  CARDIOVASCULAR:  Negative for chest pain or palpitations  RESPIRATORY:  Negative for cough, wheezing, or SOB   GASTROINTESTINAL:  Negative for nausea, vomiting, diarrhea, constipation, or abdominal pain  GENITOURINARY:  Negative frequency, urgency or dysuria  NEUROLOGIC:  No headache, confusion, dizziness, lightheadedness    MEDICATIONS  (STANDING):  amLODIPine   Tablet 10 milliGRAM(s) Oral daily  atorvastatin 10 milliGRAM(s) Oral at bedtime  baclofen 2.5 milliGRAM(s) Oral every 12 hours  brimonidine 0.2% Ophthalmic Solution 1 Drop(s) Both EYES three times a day  chlorhexidine 2% Cloths 1 Application(s) Topical daily  dextrose 5%. 1000 milliLiter(s) (50 mL/Hr) IV Continuous <Continuous>  dextrose 50% Injectable 12.5 Gram(s) IV Push once  dextrose 50% Injectable 25 Gram(s) IV Push once  dextrose 50% Injectable 25 Gram(s) IV Push once  diphenoxylate/atropine 1 Tablet(s) Oral daily  dorzolamide 2% Ophthalmic Solution 1 Drop(s) Both EYES three times a day  gabapentin 300 milliGRAM(s) Oral every 8 hours  heparin   Injectable 7500 Unit(s) SubCutaneous every 8 hours  insulin glargine Injectable (LANTUS) 14 Unit(s) SubCutaneous at bedtime  insulin lispro (HumaLOG) corrective regimen sliding scale   SubCutaneous Before meals and at bedtime  insulin lispro Injectable (HumaLOG) 6 Unit(s) SubCutaneous three times a day before meals  pantoprazole    Tablet 40 milliGRAM(s) Oral every 12 hours  rifAXIMin 550 milliGRAM(s) Oral two times a day  sodium bicarbonate 650 milliGRAM(s) Oral three times a day  traZODone 100 milliGRAM(s) Oral at bedtime    MEDICATIONS  (PRN):  acetaminophen   Tablet .. 500 milliGRAM(s) Oral every 6 hours PRN Mild Pain (1 - 3), Moderate Pain (4 - 6)  dextrose 40% Gel 15 Gram(s) Oral once PRN Blood Glucose LESS THAN 70 milliGRAM(s)/deciliter  glucagon  Injectable 1 milliGRAM(s) IntraMuscular once PRN Glucose LESS THAN 70 milligrams/deciliter  ondansetron    Tablet 4 milliGRAM(s) Oral every 8 hours PRN Nausea and/or Vomiting      PHYSICAL EXAM  Vital Signs Last 24 Hrs  T(C): 36.7 (12 May 2020 06:16), Max: 36.7 (12 May 2020 06:16)  T(F): 98.1 (12 May 2020 06:16), Max: 98.1 (12 May 2020 06:16)  HR: 71 (12 May 2020 06:16) (71 - 73)  BP: 157/71 (12 May 2020 06:16) (155/- - 157/71)  BP(mean): --  RR: 18 (12 May 2020 06:16) (17 - 18)  SpO2: 100% (12 May 2020 06:16) (97% - 100%)    Constitutional: wn/wd in NAD.   HEENT: NCAT, MMM, OP clear, EOMI, no proptosis or lid retraction  Neck: no thyromegaly or palpable thyroid nodules   Respiratory: lungs CTAB.  Cardiovascular: regular rhythm, normal S1 and S2, no audible murmurs, no peripheral edema  GI: soft, NT/ND, no masses/HSM appreciated.  Neurology: no tremors, DTR 2+  Skin: no visible rashes/lesions  Psychiatric: AAO x 3, normal affect/mood.    LABS:    05-11    132<L>  |  99  |  39<H>  ----------------------------<  162<H>  4.9   |  18<L>  |  2.09<H>    Ca    9.4      11 May 2020 06:10  Phos  4.9     05-11  Mg     1.4     05-11    TPro  7.8  /  Alb  3.4  /  TBili  0.2  /  DBili  x   /  AST  17  /  ALT  19  /  AlkPhos  103  05-11        Thyroid Stimulating Hormone, Serum: 3.104 uIU/mL (05-11 @ 06:10)  Thyroid Stimulating Hormone, Serum: 2.712 uIU/mL (05-07 @ 06:31)  Thyroid Stimulating Hormone, Serum: 3.063 uIU/mL (05-07 @ 06:31)  Thyroid Stimulating Hormone, Serum: 3.509 uIU/mL (05-03 @ 06:07)  Thyroid Stimulating Hormone, Serum: 3.325 uIU/mL (04-29 @ 06:17)  Thyroid Stimulating Hormone, Serum: 2.813 uIU/mL (04-14 @ 07:28)  Thyroid Stimulating Hormone, Serum: 2.877 uIU/mL (04-14 @ 00:34)      HbA1C: 6.9 % (04-05 @ 06:59)  7.6 % (03-31 @ 05:58)    CAPILLARY BLOOD GLUCOSE      POCT Blood Glucose.: 120 mg/dL (12 May 2020 11:41)  POCT Blood Glucose.: 150 mg/dL (12 May 2020 08:00)  POCT Blood Glucose.: 278 mg/dL (11 May 2020 22:09)  POCT Blood Glucose.: 236 mg/dL (11 May 2020 17:24)      Insulin Sliding Scale requirements X 24 Hours:    RADIOLOGY & ADDITIONAL TESTS:    A/P: 60y Male with history of DM type II presenting for       1.  DM -     Please continue           units lantus at bedtime  / in the morning and        units lispro with meals and lispro moderate / low dose sliding scale 4 times daily with meals and at bedtime.  Please continue consistent carbohydrate diet.      Goal FSG is   Will continue to monitor   For discharge, pt can continue    Pt can follow up at discharge with [facility] by calling  to make an appointment.   Will discuss case with     and update primary team INTERVAL HPI/OVERNIGHT EVENTS:    Patient is a 60y old  Male who presents with a chief complaint of toxic megacolon, COVID + (06 May 2020 10:01)  Spoke to patient via phone and the primary team taking care of the patient.  No acute events.  On lasix and aldactone - given the HTN and liver cirrhosis. Cr was 1.09 ( was 1.6 before). no new labs today  Saturating well on RA. appetite is good.  FSg and insulin administration reviewed  FSG & Insulin received:  Yesterday:  pre-dinner fs, did not get premeal insulin, just 4 units lispro SS,   bedtime fs, 14  lantus   units, no correction   Today:  pre-breakfast fs, 6 nutritional lispro   units+  2  units lispro SS, had eggs, oatmeal, sausage  pre-lunch fs, got 6 lispro    Pt reports the following symptoms:  CONSTITUTIONAL:  Negative fever or chills, feels well, good appetite  CARDIOVASCULAR:  Negative for chest pain or palpitations  RESPIRATORY:  Negative for cough, wheezing, or SOB   GASTROINTESTINAL:  Negative for nausea, vomiting, diarrhea, constipation, or abdominal pain  NEUROLOGIC:  No headache, confusion, dizziness, lightheadedness    MEDICATIONS  (STANDING):  amLODIPine   Tablet 10 milliGRAM(s) Oral daily  atorvastatin 10 milliGRAM(s) Oral at bedtime  baclofen 2.5 milliGRAM(s) Oral every 12 hours  brimonidine 0.2% Ophthalmic Solution 1 Drop(s) Both EYES three times a day  chlorhexidine 2% Cloths 1 Application(s) Topical daily  dextrose 5%. 1000 milliLiter(s) (50 mL/Hr) IV Continuous <Continuous>  dextrose 50% Injectable 12.5 Gram(s) IV Push once  dextrose 50% Injectable 25 Gram(s) IV Push once  dextrose 50% Injectable 25 Gram(s) IV Push once  diphenoxylate/atropine 1 Tablet(s) Oral daily  dorzolamide 2% Ophthalmic Solution 1 Drop(s) Both EYES three times a day  gabapentin 300 milliGRAM(s) Oral every 8 hours  heparin   Injectable 7500 Unit(s) SubCutaneous every 8 hours  insulin glargine Injectable (LANTUS) 14 Unit(s) SubCutaneous at bedtime  insulin lispro (HumaLOG) corrective regimen sliding scale   SubCutaneous Before meals and at bedtime  insulin lispro Injectable (HumaLOG) 6 Unit(s) SubCutaneous three times a day before meals  pantoprazole    Tablet 40 milliGRAM(s) Oral every 12 hours  rifAXIMin 550 milliGRAM(s) Oral two times a day  sodium bicarbonate 650 milliGRAM(s) Oral three times a day  traZODone 100 milliGRAM(s) Oral at bedtime    MEDICATIONS  (PRN):  acetaminophen   Tablet .. 500 milliGRAM(s) Oral every 6 hours PRN Mild Pain (1 - 3), Moderate Pain (4 - 6)  dextrose 40% Gel 15 Gram(s) Oral once PRN Blood Glucose LESS THAN 70 milliGRAM(s)/deciliter  glucagon  Injectable 1 milliGRAM(s) IntraMuscular once PRN Glucose LESS THAN 70 milligrams/deciliter  ondansetron    Tablet 4 milliGRAM(s) Oral every 8 hours PRN Nausea and/or Vomiting      PHYSICAL EXAM  Vital Signs Last 24 Hrs  T(C): 36.7 (12 May 2020 06:16), Max: 36.7 (12 May 2020 06:16)  T(F): 98.1 (12 May 2020 06:16), Max: 98.1 (12 May 2020 06:16)  HR: 71 (12 May 2020 06:16) (71 - 73)  BP: 157/71 (12 May 2020 06:16) (155/- - 157/71)  BP(mean): --  RR: 18 (12 May 2020 06:16) (17 - 18)  SpO2: 100% (12 May 2020 06:16) (97% - 100%)  Due to the nature of this patient’s COVID-19 isolation status (either confirmed or suspected), this note was prepared without a bedside physical examination to prevent spread of infection and to conserve personal protective equipment during this nationwide pandemic. If possible, direct patient communication occurred via electronic measures or telephone conversation. Examination highlights were provided by a bedside nurse wearing personal protective equipment and review of pertinent medical records. Objective data (vital signs, urine output, lab results, imaging studies, medications, etc) were reviewed in detail. Face to face visits and physical examination have been limited only to patients for whom it is required for medical decision making.    LABS:        132<L>  |  99  |  39<H>  ----------------------------<  162<H>  4.9   |  18<L>  |  2.09<H>    Ca    9.4      11 May 2020 06:10  Phos  4.9       Mg     1.4         TPro  7.8  /  Alb  3.4  /  TBili  0.2  /  DBili  x   /  AST  17  /  ALT  19  /  AlkPhos  103          Thyroid Stimulating Hormone, Serum: 3.104 uIU/mL ( @ 06:10)  Thyroid Stimulating Hormone, Serum: 2.712 uIU/mL ( @ 06:31)  Thyroid Stimulating Hormone, Serum: 3.063 uIU/mL ( @ 06:31)  Thyroid Stimulating Hormone, Serum: 3.509 uIU/mL ( @ 06:07)  Thyroid Stimulating Hormone, Serum: 3.325 uIU/mL ( @ 06:17)  Thyroid Stimulating Hormone, Serum: 2.813 uIU/mL ( @ 07:28)  Thyroid Stimulating Hormone, Serum: 2.877 uIU/mL ( @ 00:34)      HbA1C: 6.9 % ( @ 06:59)  7.6 % ( @ 05:58)    CAPILLARY BLOOD GLUCOSE      POCT Blood Glucose.: 120 mg/dL (12 May 2020 11:41)  POCT Blood Glucose.: 150 mg/dL (12 May 2020 08:00)  POCT Blood Glucose.: 278 mg/dL (11 May 2020 22:09)  POCT Blood Glucose.: 236 mg/dL (11 May 2020 17:24)      Insulin Sliding Scale requirements X 24 Hours:    RADIOLOGY & ADDITIONAL TESTS:    A/P 60yMale with hx of DM now with COVID, worsening renal function, moderate hyperglycemia.     1.  DM: type 2  Please continue Lantus 14 units at bedtime  PLease continue Lispro 6  units tid with meals.   Continue lispro moderate dose scale with meals and at bedtime.   Continue consistent carb diet  FSG Goal 100-180  GFR 40 and EF 65%    2.  Hyperlipidemia - goal LDL < 70  - on lipitor 10 mg once daily    3.  Obesity - outpatient medical management.      4.  Hypothyroidism   - TSH on admission 2.8.   - levothyroxine 50mcg discontinued on  to assess necessity as there is no evidence of autoimmune hypothyroidism at this time.   - NO levothyroxine at this time.   - Repeat TSH 3.32 and free T4 1.03 ().   - TSH 3.5 and Free T4 1.01 on 5/3   - TSh was 2.712 and free T4 was 0.98 on   TSh was 3.104 and free T4 was 0.86 on   - repeat TSh and free T4 on 20    For discharge, metformin 1000mg BID and glimepiride 1mg before breakfast.    Case seen and discussed with  and updated the primary team.  Pt can follow up at discharge with [facility] by calling  to make an appointment.

## 2020-05-12 NOTE — PROGRESS NOTE ADULT - ASSESSMENT
A/P: 60 M with PMH of HTN, DM complicated by neuropathy, EtOH liver cirrhosis, presenting w/ pneumatosis of unknown etiology, s/p sub total colectomy and end ileostomy on 3/31, c/b fever and purulent wound infection (4/9) now s/p paracentesis 4/13 found to be COVID (+). Clinically stable. In no acute distress.     -Continue with Lomotil  -Continue to trend ostomy output  -Dispo planning and care per medicine team  -Discussed with chief resident and Dr. Storm  -Surgery team 4c to follow

## 2020-05-13 LAB
ANION GAP SERPL CALC-SCNC: 14 MMOL/L — SIGNIFICANT CHANGE UP (ref 5–17)
BUN SERPL-MCNC: 45 MG/DL — HIGH (ref 7–23)
CALCIUM SERPL-MCNC: 9.8 MG/DL — SIGNIFICANT CHANGE UP (ref 8.4–10.5)
CHLORIDE SERPL-SCNC: 98 MMOL/L — SIGNIFICANT CHANGE UP (ref 96–108)
CO2 SERPL-SCNC: 20 MMOL/L — LOW (ref 22–31)
CREAT SERPL-MCNC: 1.77 MG/DL — HIGH (ref 0.5–1.3)
GLUCOSE BLDC GLUCOMTR-MCNC: 126 MG/DL — HIGH (ref 70–99)
GLUCOSE BLDC GLUCOMTR-MCNC: 141 MG/DL — HIGH (ref 70–99)
GLUCOSE BLDC GLUCOMTR-MCNC: 149 MG/DL — HIGH (ref 70–99)
GLUCOSE BLDC GLUCOMTR-MCNC: 201 MG/DL — HIGH (ref 70–99)
GLUCOSE SERPL-MCNC: 113 MG/DL — HIGH (ref 70–99)
PHOSPHATE SERPL-MCNC: 4.9 MG/DL — HIGH (ref 2.5–4.5)
POTASSIUM SERPL-MCNC: 4.8 MMOL/L — SIGNIFICANT CHANGE UP (ref 3.5–5.3)
POTASSIUM SERPL-SCNC: 4.8 MMOL/L — SIGNIFICANT CHANGE UP (ref 3.5–5.3)
SODIUM SERPL-SCNC: 132 MMOL/L — LOW (ref 135–145)
T4 FREE SERPL-MCNC: 0.92 NG/DL — SIGNIFICANT CHANGE UP (ref 0.7–1.48)
TSH SERPL-MCNC: 3.87 UIU/ML — SIGNIFICANT CHANGE UP (ref 0.35–4.94)

## 2020-05-13 RX ADMIN — Medication 1 APPLICATION(S): at 06:02

## 2020-05-13 RX ADMIN — BRIMONIDINE TARTRATE 1 DROP(S): 2 SOLUTION/ DROPS OPHTHALMIC at 06:03

## 2020-05-13 RX ADMIN — Medication 2.5 MILLIGRAM(S): at 18:54

## 2020-05-13 RX ADMIN — Medication 650 MILLIGRAM(S): at 14:25

## 2020-05-13 RX ADMIN — Medication 6 UNIT(S): at 09:30

## 2020-05-13 RX ADMIN — BRIMONIDINE TARTRATE 1 DROP(S): 2 SOLUTION/ DROPS OPHTHALMIC at 14:57

## 2020-05-13 RX ADMIN — PANTOPRAZOLE SODIUM 40 MILLIGRAM(S): 20 TABLET, DELAYED RELEASE ORAL at 05:50

## 2020-05-13 RX ADMIN — HEPARIN SODIUM 7500 UNIT(S): 5000 INJECTION INTRAVENOUS; SUBCUTANEOUS at 05:51

## 2020-05-13 RX ADMIN — Medication 1 APPLICATION(S): at 17:48

## 2020-05-13 RX ADMIN — GABAPENTIN 300 MILLIGRAM(S): 400 CAPSULE ORAL at 21:23

## 2020-05-13 RX ADMIN — Medication 6 UNIT(S): at 17:48

## 2020-05-13 RX ADMIN — Medication 4: at 22:34

## 2020-05-13 RX ADMIN — HEPARIN SODIUM 7500 UNIT(S): 5000 INJECTION INTRAVENOUS; SUBCUTANEOUS at 21:23

## 2020-05-13 RX ADMIN — DORZOLAMIDE HYDROCHLORIDE 1 DROP(S): 20 SOLUTION/ DROPS OPHTHALMIC at 21:30

## 2020-05-13 RX ADMIN — INSULIN GLARGINE 14 UNIT(S): 100 INJECTION, SOLUTION SUBCUTANEOUS at 22:35

## 2020-05-13 RX ADMIN — Medication 1 TABLET(S): at 12:50

## 2020-05-13 RX ADMIN — DORZOLAMIDE HYDROCHLORIDE 1 DROP(S): 20 SOLUTION/ DROPS OPHTHALMIC at 14:56

## 2020-05-13 RX ADMIN — SPIRONOLACTONE 50 MILLIGRAM(S): 25 TABLET, FILM COATED ORAL at 05:52

## 2020-05-13 RX ADMIN — GABAPENTIN 300 MILLIGRAM(S): 400 CAPSULE ORAL at 14:25

## 2020-05-13 RX ADMIN — ATORVASTATIN CALCIUM 10 MILLIGRAM(S): 80 TABLET, FILM COATED ORAL at 21:23

## 2020-05-13 RX ADMIN — PANTOPRAZOLE SODIUM 40 MILLIGRAM(S): 20 TABLET, DELAYED RELEASE ORAL at 17:51

## 2020-05-13 RX ADMIN — AMLODIPINE BESYLATE 10 MILLIGRAM(S): 2.5 TABLET ORAL at 05:50

## 2020-05-13 RX ADMIN — Medication 6 UNIT(S): at 12:50

## 2020-05-13 RX ADMIN — Medication 100 MILLIGRAM(S): at 21:26

## 2020-05-13 RX ADMIN — HEPARIN SODIUM 7500 UNIT(S): 5000 INJECTION INTRAVENOUS; SUBCUTANEOUS at 14:25

## 2020-05-13 RX ADMIN — Medication 650 MILLIGRAM(S): at 21:25

## 2020-05-13 RX ADMIN — DORZOLAMIDE HYDROCHLORIDE 1 DROP(S): 20 SOLUTION/ DROPS OPHTHALMIC at 06:03

## 2020-05-13 RX ADMIN — Medication 2.5 MILLIGRAM(S): at 05:50

## 2020-05-13 RX ADMIN — GABAPENTIN 300 MILLIGRAM(S): 400 CAPSULE ORAL at 05:52

## 2020-05-13 RX ADMIN — BRIMONIDINE TARTRATE 1 DROP(S): 2 SOLUTION/ DROPS OPHTHALMIC at 21:30

## 2020-05-13 RX ADMIN — Medication 650 MILLIGRAM(S): at 05:52

## 2020-05-13 RX ADMIN — Medication 500 MILLIGRAM(S): at 10:29

## 2020-05-13 NOTE — PROGRESS NOTE ADULT - ASSESSMENT
A/P: 60 M with PMH of HTN, DM complicated by neuropathy, EtOH liver cirrhosis, presenting w/ pneumatosis of unknown etiology, s/p sub total colectomy and end ileostomy on 3/31, c/b fever and purulent wound infection (4/9) now s/p paracentesis 4/13 found to be COVID (+). Clinically stable. In no acute distress.     -Continue with Lomotil  -continue dressing changes per wound care  -Continue to trend ostomy output  -Dispo planning and care per medicine team  -Discussed with Dr. Welsh  -Surgery team 4c to follow

## 2020-05-13 NOTE — PROGRESS NOTE ADULT - SUBJECTIVE AND OBJECTIVE BOX
INTERVAL HPI/OVERNIGHT EVENTS:    Patient is a 60y old  Male who presents with a chief complaint of toxic megacolon, COVID + (06 May 2020 10:01)      Pt reports the following symptoms:    CONSTITUTIONAL:  Negative fever or chills, feels well, good appetite  EYES:  Negative  blurry vision or double vision  CARDIOVASCULAR:  Negative for chest pain or palpitations  RESPIRATORY:  Negative for cough, wheezing, or SOB   GASTROINTESTINAL:  Negative for nausea, vomiting, diarrhea, constipation, or abdominal pain  GENITOURINARY:  Negative frequency, urgency or dysuria  NEUROLOGIC:  No headache, confusion, dizziness, lightheadedness    MEDICATIONS  (STANDING):  amLODIPine   Tablet 10 milliGRAM(s) Oral daily  AQUAPHOR (petrolatum Ointment) 1 Application(s) Topical two times a day  atorvastatin 10 milliGRAM(s) Oral at bedtime  baclofen 2.5 milliGRAM(s) Oral every 12 hours  brimonidine 0.2% Ophthalmic Solution 1 Drop(s) Both EYES three times a day  chlorhexidine 2% Cloths 1 Application(s) Topical daily  dextrose 5%. 1000 milliLiter(s) (50 mL/Hr) IV Continuous <Continuous>  dextrose 50% Injectable 12.5 Gram(s) IV Push once  dextrose 50% Injectable 25 Gram(s) IV Push once  dextrose 50% Injectable 25 Gram(s) IV Push once  diphenoxylate/atropine 1 Tablet(s) Oral daily  dorzolamide 2% Ophthalmic Solution 1 Drop(s) Both EYES three times a day  furosemide    Tablet 20 milliGRAM(s) Oral daily  gabapentin 300 milliGRAM(s) Oral every 8 hours  heparin   Injectable 7500 Unit(s) SubCutaneous every 8 hours  insulin glargine Injectable (LANTUS) 14 Unit(s) SubCutaneous at bedtime  insulin lispro (HumaLOG) corrective regimen sliding scale   SubCutaneous Before meals and at bedtime  insulin lispro Injectable (HumaLOG) 6 Unit(s) SubCutaneous three times a day before meals  pantoprazole    Tablet 40 milliGRAM(s) Oral every 12 hours  rifAXIMin 550 milliGRAM(s) Oral two times a day  sodium bicarbonate 650 milliGRAM(s) Oral three times a day  spironolactone 50 milliGRAM(s) Oral daily  traZODone 100 milliGRAM(s) Oral at bedtime    MEDICATIONS  (PRN):  acetaminophen   Tablet .. 500 milliGRAM(s) Oral every 6 hours PRN Mild Pain (1 - 3), Moderate Pain (4 - 6)  dextrose 40% Gel 15 Gram(s) Oral once PRN Blood Glucose LESS THAN 70 milliGRAM(s)/deciliter  glucagon  Injectable 1 milliGRAM(s) IntraMuscular once PRN Glucose LESS THAN 70 milligrams/deciliter  ondansetron    Tablet 4 milliGRAM(s) Oral every 8 hours PRN Nausea and/or Vomiting      PHYSICAL EXAM  Vital Signs Last 24 Hrs  T(C): 36.7 (13 May 2020 06:50), Max: 36.7 (13 May 2020 06:50)  T(F): 98.1 (13 May 2020 06:50), Max: 98.1 (13 May 2020 06:50)  HR: 57 (13 May 2020 06:50) (57 - 65)  BP: 142/57 (13 May 2020 06:50) (142/57 - 149/72)  BP(mean): --  RR: 17 (13 May 2020 06:50) (17 - 17)  SpO2: 100% (13 May 2020 06:50) (95% - 100%)    Constitutional: wn/wd in NAD.   HEENT: NCAT, MMM, OP clear, EOMI, no proptosis or lid retraction  Neck: no thyromegaly or palpable thyroid nodules   Respiratory: lungs CTAB.  Cardiovascular: regular rhythm, normal S1 and S2, no audible murmurs, no peripheral edema  GI: soft, NT/ND, no masses/HSM appreciated.  Neurology: no tremors, DTR 2+  Skin: no visible rashes/lesions  Psychiatric: AAO x 3, normal affect/mood.    LABS:    05-13    132<L>  |  98  |  45<H>  ----------------------------<  113<H>  4.8   |  20<L>  |  1.77<H>    Ca    9.8      13 May 2020 06:50  Phos  4.9     05-13          Thyroid Stimulating Hormone, Serum: 3.869 uIU/mL (05-13 @ 06:50)  Thyroid Stimulating Hormone, Serum: 3.104 uIU/mL (05-11 @ 06:10)  Thyroid Stimulating Hormone, Serum: 2.712 uIU/mL (05-07 @ 06:31)  Thyroid Stimulating Hormone, Serum: 3.063 uIU/mL (05-07 @ 06:31)  Thyroid Stimulating Hormone, Serum: 3.509 uIU/mL (05-03 @ 06:07)  Thyroid Stimulating Hormone, Serum: 3.325 uIU/mL (04-29 @ 06:17)  Thyroid Stimulating Hormone, Serum: 2.813 uIU/mL (04-14 @ 07:28)  Thyroid Stimulating Hormone, Serum: 2.877 uIU/mL (04-14 @ 00:34)      HbA1C: 6.9 % (04-05 @ 06:59)  7.6 % (03-31 @ 05:58)    CAPILLARY BLOOD GLUCOSE      POCT Blood Glucose.: 141 mg/dL (13 May 2020 11:53)  POCT Blood Glucose.: 126 mg/dL (13 May 2020 07:53)  POCT Blood Glucose.: 177 mg/dL (12 May 2020 21:47)  POCT Blood Glucose.: 117 mg/dL (12 May 2020 16:42)      Insulin Sliding Scale requirements X 24 Hours:    RADIOLOGY & ADDITIONAL TESTS:    A/P: 60y Male with history of DM type II presenting for       1.  DM -     Please continue           units lantus at bedtime  / in the morning and        units lispro with meals and lispro moderate / low dose sliding scale 4 times daily with meals and at bedtime.  Please continue consistent carbohydrate diet.      Goal FSG is   Will continue to monitor   For discharge, pt can continue    Pt can follow up at discharge with [facility] by calling  to make an appointment.   Will discuss case with     and update primary team

## 2020-05-13 NOTE — PROGRESS NOTE ADULT - ASSESSMENT
60M PMH HTN, DM + neuropathy, IVDU, ETOH liver cirrhosis, presents to [facility] s/p fall at home; found with toxic megacolon and pneumatosis; now s/p ex lap + subtotal colectomy, ileostomy (3/31). Hospital course further complicated by COVID, MEG, anemia (refused further GI workeup). Pt stable for discharge pending instatement of proper woundcare services.

## 2020-05-13 NOTE — PROGRESS NOTE ADULT - SUBJECTIVE AND OBJECTIVE BOX
OVERNIGHT EVENTS: No acute events overnight.  SUBJECTIVE: Patient seen and examined at the bedside. No acute complaints at this time;  12-point ROS reviewed and is otherwise negative.    Vital Signs Last 12 Hrs  T(F): 98.1 (05-13-20 @ 06:50), Max: 98.1 (05-13-20 @ 06:50)  HR: 57 (05-13-20 @ 06:50) (57 - 65)  BP: 142/57 (05-13-20 @ 06:50) (142/57 - 149/72)  BP(mean): --  RR: 17 (05-13-20 @ 06:50) (17 - 17)  SpO2: 100% (05-13-20 @ 06:50) (95% - 100%)    I&O's Summary    12 May 2020 07:01  -  13 May 2020 07:00  --------------------------------------------------------  IN: 0 mL / OUT: 400 mL / NET: -400 mL    PHYSICAL EXAM:  General: NAD, resting comfortably in bed  HEENT: NCAT, EOMI, MMM  Neck: supple  Respiratory: CTA, normal respiratory rate/depth/effort  Cardiovascular: normal S1/S2, RRR, no MRG  Vascular: 2+ radial/DP pulses b/l  Abdomen: NT, soft, ileostomy output nonbloody; midline wound dressing c/d/i  Extremities: WWP, no edema  Neuro: AAOx3    LABS:    05-13    132<L>  |  98  |  45<H>  ----------------------------<  113<H>  4.8   |  20<L>  |  1.77<H>    Ca    9.8      13 May 2020 06:50      RADIOLOGY & ADDITIONAL TESTS: Reviewed    MEDICATIONS  (STANDING):  amLODIPine   Tablet 10 milliGRAM(s) Oral daily  AQUAPHOR (petrolatum Ointment) 1 Application(s) Topical two times a day  atorvastatin 10 milliGRAM(s) Oral at bedtime  baclofen 2.5 milliGRAM(s) Oral every 12 hours  brimonidine 0.2% Ophthalmic Solution 1 Drop(s) Both EYES three times a day  chlorhexidine 2% Cloths 1 Application(s) Topical daily  dextrose 5%. 1000 milliLiter(s) (50 mL/Hr) IV Continuous <Continuous>  dextrose 50% Injectable 12.5 Gram(s) IV Push once  dextrose 50% Injectable 25 Gram(s) IV Push once  dextrose 50% Injectable 25 Gram(s) IV Push once  diphenoxylate/atropine 1 Tablet(s) Oral daily  dorzolamide 2% Ophthalmic Solution 1 Drop(s) Both EYES three times a day  furosemide    Tablet 20 milliGRAM(s) Oral daily  gabapentin 300 milliGRAM(s) Oral every 8 hours  heparin   Injectable 7500 Unit(s) SubCutaneous every 8 hours  insulin glargine Injectable (LANTUS) 14 Unit(s) SubCutaneous at bedtime  insulin lispro (HumaLOG) corrective regimen sliding scale   SubCutaneous Before meals and at bedtime  insulin lispro Injectable (HumaLOG) 6 Unit(s) SubCutaneous three times a day before meals  pantoprazole    Tablet 40 milliGRAM(s) Oral every 12 hours  rifAXIMin 550 milliGRAM(s) Oral two times a day  sodium bicarbonate 650 milliGRAM(s) Oral three times a day  spironolactone 50 milliGRAM(s) Oral daily  traZODone 100 milliGRAM(s) Oral at bedtime    MEDICATIONS  (PRN):  acetaminophen   Tablet .. 500 milliGRAM(s) Oral every 6 hours PRN Mild Pain (1 - 3), Moderate Pain (4 - 6)  dextrose 40% Gel 15 Gram(s) Oral once PRN Blood Glucose LESS THAN 70 milliGRAM(s)/deciliter  glucagon  Injectable 1 milliGRAM(s) IntraMuscular once PRN Glucose LESS THAN 70 milligrams/deciliter  ondansetron    Tablet 4 milliGRAM(s) Oral every 8 hours PRN Nausea and/or Vomiting

## 2020-05-13 NOTE — PROGRESS NOTE ADULT - SUBJECTIVE AND OBJECTIVE BOX
DAILY PROGRESS NOTE:    S: Tolerating diet, having bowel fxn.    O:    Vital Signs Last 24 Hrs  T(C): 36.7 (13 May 2020 06:50), Max: 36.7 (13 May 2020 06:50)  T(F): 98.1 (13 May 2020 06:50), Max: 98.1 (13 May 2020 06:50)  HR: 57 (13 May 2020 06:50) (57 - 66)  BP: 142/57 (13 May 2020 06:50) (142/57 - 153/67)  BP(mean): --  RR: 17 (13 May 2020 06:50) (17 - 19)  SpO2: 100% (13 May 2020 06:50) (95% - 100%)    I&O's Detail    12 May 2020 07:01  -  13 May 2020 07:00  --------------------------------------------------------  IN:  Total IN: 0 mL    OUT:    Ileostomy: 150 mL    Voided: 250 mL  Total OUT: 400 mL    Total NET: -400 mL      General: NAD, resting comfortably  Neuro: AAOX3, EOMI, PERRLA, no scleral icterus  Pulm: CTAB, Nonlabored breathing  CV: S1/S2 normal, no murmurs  Abdomen: soft, minimal distention, nontender, no rebound, no guarding. Ileostomy healthy-appearing with 300cc brown watery output, site c/d/i without evidence of bleeding.  Extremities: WWP, No LE edema b/l      LABS:    05-13    132<L>  |  98  |  45<H>  ----------------------------<  113<H>  4.8   |  20<L>  |  1.77<H>    Ca    9.8      13 May 2020 06:50  Phos  4.9     05-13            RADIOLOGY & ADDITIONAL STUDIES: DAILY PROGRESS NOTE:    S: Tolerating diet, having bowel fxn.    O:    Vital Signs Last 24 Hrs  T(C): 36.7 (13 May 2020 06:50), Max: 36.7 (13 May 2020 06:50)  T(F): 98.1 (13 May 2020 06:50), Max: 98.1 (13 May 2020 06:50)  HR: 57 (13 May 2020 06:50) (57 - 66)  BP: 142/57 (13 May 2020 06:50) (142/57 - 153/67)  BP(mean): --  RR: 17 (13 May 2020 06:50) (17 - 19)  SpO2: 100% (13 May 2020 06:50) (95% - 100%)    I&O's Detail    12 May 2020 07:01  -  13 May 2020 07:00  --------------------------------------------------------  IN:  Total IN: 0 mL    OUT:    Ileostomy: 150 mL    Voided: 250 mL  Total OUT: 400 mL    Total NET: -400 mL      General: NAD, resting comfortably  Neuro: AAOX3, EOMI, PERRLA, no scleral icterus  Pulm: CTAB, Nonlabored breathing  CV: S1/S2 normal, no murmurs  Abdomen: soft, minimal distention, nontender, no rebound, no guarding. Ileostomy healthy-appearing with brown soft output, surgical dressing CDI without evidence of bleeding.  Extremities: WWP, No LE edema b/l      LABS:    05-13    132<L>  |  98  |  45<H>  ----------------------------<  113<H>  4.8   |  20<L>  |  1.77<H>    Ca    9.8      13 May 2020 06:50  Phos  4.9     05-13            RADIOLOGY & ADDITIONAL STUDIES:

## 2020-05-14 LAB
GLUCOSE BLDC GLUCOMTR-MCNC: 141 MG/DL — HIGH (ref 70–99)
GLUCOSE BLDC GLUCOMTR-MCNC: 147 MG/DL — HIGH (ref 70–99)
GLUCOSE BLDC GLUCOMTR-MCNC: 185 MG/DL — HIGH (ref 70–99)
GLUCOSE BLDC GLUCOMTR-MCNC: 224 MG/DL — HIGH (ref 70–99)
SARS-COV-2 RNA SPEC QL NAA+PROBE: SIGNIFICANT CHANGE UP

## 2020-05-14 PROCEDURE — 99231 SBSQ HOSP IP/OBS SF/LOW 25: CPT | Mod: GC

## 2020-05-14 RX ORDER — METFORMIN HYDROCHLORIDE 850 MG/1
500 TABLET ORAL
Refills: 0 | Status: DISCONTINUED | OUTPATIENT
Start: 2020-05-15 | End: 2020-05-18

## 2020-05-14 RX ADMIN — Medication 1 APPLICATION(S): at 17:29

## 2020-05-14 RX ADMIN — BRIMONIDINE TARTRATE 1 DROP(S): 2 SOLUTION/ DROPS OPHTHALMIC at 13:51

## 2020-05-14 RX ADMIN — Medication 1 APPLICATION(S): at 11:49

## 2020-05-14 RX ADMIN — Medication 6 UNIT(S): at 17:24

## 2020-05-14 RX ADMIN — Medication 1 TABLET(S): at 11:35

## 2020-05-14 RX ADMIN — ATORVASTATIN CALCIUM 10 MILLIGRAM(S): 80 TABLET, FILM COATED ORAL at 21:23

## 2020-05-14 RX ADMIN — Medication 650 MILLIGRAM(S): at 21:22

## 2020-05-14 RX ADMIN — BRIMONIDINE TARTRATE 1 DROP(S): 2 SOLUTION/ DROPS OPHTHALMIC at 06:02

## 2020-05-14 RX ADMIN — HEPARIN SODIUM 7500 UNIT(S): 5000 INJECTION INTRAVENOUS; SUBCUTANEOUS at 05:38

## 2020-05-14 RX ADMIN — INSULIN GLARGINE 14 UNIT(S): 100 INJECTION, SOLUTION SUBCUTANEOUS at 21:27

## 2020-05-14 RX ADMIN — Medication 2.5 MILLIGRAM(S): at 17:29

## 2020-05-14 RX ADMIN — Medication 1 APPLICATION(S): at 13:48

## 2020-05-14 RX ADMIN — Medication 500 MILLIGRAM(S): at 21:23

## 2020-05-14 RX ADMIN — BRIMONIDINE TARTRATE 1 DROP(S): 2 SOLUTION/ DROPS OPHTHALMIC at 21:23

## 2020-05-14 RX ADMIN — PANTOPRAZOLE SODIUM 40 MILLIGRAM(S): 20 TABLET, DELAYED RELEASE ORAL at 05:36

## 2020-05-14 RX ADMIN — Medication 650 MILLIGRAM(S): at 13:49

## 2020-05-14 RX ADMIN — HEPARIN SODIUM 7500 UNIT(S): 5000 INJECTION INTRAVENOUS; SUBCUTANEOUS at 21:23

## 2020-05-14 RX ADMIN — Medication 2.5 MILLIGRAM(S): at 06:02

## 2020-05-14 RX ADMIN — Medication 2: at 08:30

## 2020-05-14 RX ADMIN — Medication 20 MILLIGRAM(S): at 05:38

## 2020-05-14 RX ADMIN — SPIRONOLACTONE 50 MILLIGRAM(S): 25 TABLET, FILM COATED ORAL at 05:37

## 2020-05-14 RX ADMIN — Medication 100 MILLIGRAM(S): at 21:22

## 2020-05-14 RX ADMIN — GABAPENTIN 300 MILLIGRAM(S): 400 CAPSULE ORAL at 05:37

## 2020-05-14 RX ADMIN — DORZOLAMIDE HYDROCHLORIDE 1 DROP(S): 20 SOLUTION/ DROPS OPHTHALMIC at 21:24

## 2020-05-14 RX ADMIN — GABAPENTIN 300 MILLIGRAM(S): 400 CAPSULE ORAL at 21:23

## 2020-05-14 RX ADMIN — Medication 1 APPLICATION(S): at 05:36

## 2020-05-14 RX ADMIN — AMLODIPINE BESYLATE 10 MILLIGRAM(S): 2.5 TABLET ORAL at 05:36

## 2020-05-14 RX ADMIN — Medication 6 UNIT(S): at 08:29

## 2020-05-14 RX ADMIN — Medication 4: at 21:28

## 2020-05-14 RX ADMIN — GABAPENTIN 300 MILLIGRAM(S): 400 CAPSULE ORAL at 13:49

## 2020-05-14 RX ADMIN — HEPARIN SODIUM 7500 UNIT(S): 5000 INJECTION INTRAVENOUS; SUBCUTANEOUS at 13:50

## 2020-05-14 RX ADMIN — PANTOPRAZOLE SODIUM 40 MILLIGRAM(S): 20 TABLET, DELAYED RELEASE ORAL at 17:34

## 2020-05-14 RX ADMIN — DORZOLAMIDE HYDROCHLORIDE 1 DROP(S): 20 SOLUTION/ DROPS OPHTHALMIC at 13:51

## 2020-05-14 RX ADMIN — DORZOLAMIDE HYDROCHLORIDE 1 DROP(S): 20 SOLUTION/ DROPS OPHTHALMIC at 06:02

## 2020-05-14 RX ADMIN — Medication 650 MILLIGRAM(S): at 05:37

## 2020-05-14 RX ADMIN — Medication 6 UNIT(S): at 11:46

## 2020-05-14 RX ADMIN — CHLORHEXIDINE GLUCONATE 1 APPLICATION(S): 213 SOLUTION TOPICAL at 11:47

## 2020-05-14 NOTE — PROGRESS NOTE ADULT - ASSESSMENT
60M PMH HTN, DM, EtOH, cirrhosis, a/w pneumatosis, s/p subtotal colectomy and end ileostomy on 3/31, c/b fever and purulent wound infection (4/9), s/p paracentesis 4/13 found to be COVID (+).     - Continue with Lomotil  - Continue dressing changes per wound care  - Dispo planning and care per medicine team  - Surgery Team 4C will continue to follow. Please page Team 4 with questions/clinical changes. [phone] 60M PMH HTN, DM, EtOH, cirrhosis, a/w pneumatosis, s/p subtotal colectomy and end ileostomy on 3/31, c/b fever and purulent wound infection (4/9), s/p paracentesis 4/13 found to be COVID (+).     - Continue with Lomotil  - Continue dressing changes per wound care  - Dispo planning and care per medicine team  - Surgery will sign off. Please reconsult with questions/concerns.

## 2020-05-14 NOTE — PROGRESS NOTE ADULT - SUBJECTIVE AND OBJECTIVE BOX
INTERVAL HPI/OVERNIGHT EVENTS:    Patient is a 60y old  Male who presents with a chief complaint of toxic megacolon, COVID + (06 May 2020 10:01)      Pt reports the following symptoms:    CONSTITUTIONAL:  Negative fever or chills, feels well, good appetite  EYES:  Negative  blurry vision or double vision  CARDIOVASCULAR:  Negative for chest pain or palpitations  RESPIRATORY:  Negative for cough, wheezing, or SOB   GASTROINTESTINAL:  Negative for nausea, vomiting, diarrhea, constipation, or abdominal pain  GENITOURINARY:  Negative frequency, urgency or dysuria  NEUROLOGIC:  No headache, confusion, dizziness, lightheadedness    MEDICATIONS  (STANDING):  amLODIPine   Tablet 10 milliGRAM(s) Oral daily  AQUAPHOR (petrolatum Ointment) 1 Application(s) Topical two times a day  atorvastatin 10 milliGRAM(s) Oral at bedtime  baclofen 2.5 milliGRAM(s) Oral every 12 hours  brimonidine 0.2% Ophthalmic Solution 1 Drop(s) Both EYES three times a day  chlorhexidine 2% Cloths 1 Application(s) Topical daily  dextrose 5%. 1000 milliLiter(s) (50 mL/Hr) IV Continuous <Continuous>  dextrose 50% Injectable 12.5 Gram(s) IV Push once  dextrose 50% Injectable 25 Gram(s) IV Push once  dextrose 50% Injectable 25 Gram(s) IV Push once  diphenoxylate/atropine 1 Tablet(s) Oral daily  dorzolamide 2% Ophthalmic Solution 1 Drop(s) Both EYES three times a day  furosemide    Tablet 20 milliGRAM(s) Oral daily  gabapentin 300 milliGRAM(s) Oral every 8 hours  heparin   Injectable 7500 Unit(s) SubCutaneous every 8 hours  insulin glargine Injectable (LANTUS) 14 Unit(s) SubCutaneous at bedtime  insulin lispro (HumaLOG) corrective regimen sliding scale   SubCutaneous Before meals and at bedtime  insulin lispro Injectable (HumaLOG) 6 Unit(s) SubCutaneous three times a day before meals  pantoprazole    Tablet 40 milliGRAM(s) Oral every 12 hours  rifAXIMin 550 milliGRAM(s) Oral two times a day  sodium bicarbonate 650 milliGRAM(s) Oral three times a day  spironolactone 50 milliGRAM(s) Oral daily  traZODone 100 milliGRAM(s) Oral at bedtime    MEDICATIONS  (PRN):  acetaminophen   Tablet .. 500 milliGRAM(s) Oral every 6 hours PRN Mild Pain (1 - 3), Moderate Pain (4 - 6)  dextrose 40% Gel 15 Gram(s) Oral once PRN Blood Glucose LESS THAN 70 milliGRAM(s)/deciliter  glucagon  Injectable 1 milliGRAM(s) IntraMuscular once PRN Glucose LESS THAN 70 milligrams/deciliter  ondansetron    Tablet 4 milliGRAM(s) Oral every 8 hours PRN Nausea and/or Vomiting      PHYSICAL EXAM  Vital Signs Last 24 Hrs  T(C): 36.7 (14 May 2020 05:14), Max: 36.7 (14 May 2020 05:14)  T(F): 98 (14 May 2020 05:14), Max: 98 (14 May 2020 05:14)  HR: 66 (14 May 2020 05:14) (61 - 66)  BP: 159/70 (14 May 2020 05:14) (150/67 - 159/70)  BP(mean): 95 (13 May 2020 14:36) (95 - 95)  RR: 18 (14 May 2020 05:14) (17 - 18)  SpO2: 99% (14 May 2020 05:14) (98% - 99%)    Constitutional: wn/wd in NAD.   HEENT: NCAT, MMM, OP clear, EOMI, no proptosis or lid retraction  Neck: no thyromegaly or palpable thyroid nodules   Respiratory: lungs CTAB.  Cardiovascular: regular rhythm, normal S1 and S2, no audible murmurs, no peripheral edema  GI: soft, NT/ND, no masses/HSM appreciated.  Neurology: no tremors, DTR 2+  Skin: no visible rashes/lesions  Psychiatric: AAO x 3, normal affect/mood.    LABS:    05-13    132<L>  |  98  |  45<H>  ----------------------------<  113<H>  4.8   |  20<L>  |  1.77<H>    Ca    9.8      13 May 2020 06:50  Phos  4.9     05-13          Thyroid Stimulating Hormone, Serum: 3.869 uIU/mL (05-13 @ 06:50)  Thyroid Stimulating Hormone, Serum: 3.104 uIU/mL (05-11 @ 06:10)  Thyroid Stimulating Hormone, Serum: 2.712 uIU/mL (05-07 @ 06:31)  Thyroid Stimulating Hormone, Serum: 3.063 uIU/mL (05-07 @ 06:31)  Thyroid Stimulating Hormone, Serum: 3.509 uIU/mL (05-03 @ 06:07)  Thyroid Stimulating Hormone, Serum: 3.325 uIU/mL (04-29 @ 06:17)  Thyroid Stimulating Hormone, Serum: 2.813 uIU/mL (04-14 @ 07:28)  Thyroid Stimulating Hormone, Serum: 2.877 uIU/mL (04-14 @ 00:34)      HbA1C: 6.9 % (04-05 @ 06:59)  7.6 % (03-31 @ 05:58)    CAPILLARY BLOOD GLUCOSE      POCT Blood Glucose.: 141 mg/dL (14 May 2020 11:26)  POCT Blood Glucose.: 185 mg/dL (14 May 2020 07:55)  POCT Blood Glucose.: 201 mg/dL (13 May 2020 21:44)  POCT Blood Glucose.: 149 mg/dL (13 May 2020 16:59)      Insulin Sliding Scale requirements X 24 Hours:    RADIOLOGY & ADDITIONAL TESTS:    A/P: 60y Male with history of DM type II presenting for       1.  DM -     Please continue           units lantus at bedtime  / in the morning and        units lispro with meals and lispro moderate / low dose sliding scale 4 times daily with meals and at bedtime.  Please continue consistent carbohydrate diet.      Goal FSG is   Will continue to monitor   For discharge, pt can continue    Pt can follow up at discharge with [facility] by calling  to make an appointment.   Will discuss case with     and update primary team INTERVAL HPI/OVERNIGHT EVENTS:    Patient is a 60y old  Male who presents with a chief complaint of toxic megacolon, COVID + (06 May 2020 10:01)  Spoke to the primary team taking care of the patient.  No acute events. Cr 1.7 with GFR 47  Saturating well on RA. appetite is good.  dispo pending     FSg and insulin administration reviewed  FSG & Insulin received:  Yesterday:  pre-dinner fs , 6 units lispro  bedtime fs, 14  lantus   units + 4 units lispro SS  Today:  pre-breakfast fs, 6 nutritional lispro   units+  2  units lispro SS  pre-lunch fs    Pt reports the following symptoms:  CONSTITUTIONAL:  Negative fever or chills, feels well, good appetite  CARDIOVASCULAR:  Negative for chest pain or palpitations  RESPIRATORY:  Negative for cough, wheezing, or SOB   GASTROINTESTINAL:  Negative for nausea, vomiting, diarrhea, constipation, or abdominal pain  NEUROLOGIC:  No headache, confusion, dizziness, lightheadedness      MEDICATIONS  (STANDING):  amLODIPine   Tablet 10 milliGRAM(s) Oral daily  AQUAPHOR (petrolatum Ointment) 1 Application(s) Topical two times a day  atorvastatin 10 milliGRAM(s) Oral at bedtime  baclofen 2.5 milliGRAM(s) Oral every 12 hours  brimonidine 0.2% Ophthalmic Solution 1 Drop(s) Both EYES three times a day  chlorhexidine 2% Cloths 1 Application(s) Topical daily  dextrose 5%. 1000 milliLiter(s) (50 mL/Hr) IV Continuous <Continuous>  dextrose 50% Injectable 12.5 Gram(s) IV Push once  dextrose 50% Injectable 25 Gram(s) IV Push once  dextrose 50% Injectable 25 Gram(s) IV Push once  diphenoxylate/atropine 1 Tablet(s) Oral daily  dorzolamide 2% Ophthalmic Solution 1 Drop(s) Both EYES three times a day  furosemide    Tablet 20 milliGRAM(s) Oral daily  gabapentin 300 milliGRAM(s) Oral every 8 hours  heparin   Injectable 7500 Unit(s) SubCutaneous every 8 hours  insulin glargine Injectable (LANTUS) 14 Unit(s) SubCutaneous at bedtime  insulin lispro (HumaLOG) corrective regimen sliding scale   SubCutaneous Before meals and at bedtime  insulin lispro Injectable (HumaLOG) 6 Unit(s) SubCutaneous three times a day before meals  pantoprazole    Tablet 40 milliGRAM(s) Oral every 12 hours  rifAXIMin 550 milliGRAM(s) Oral two times a day  sodium bicarbonate 650 milliGRAM(s) Oral three times a day  spironolactone 50 milliGRAM(s) Oral daily  traZODone 100 milliGRAM(s) Oral at bedtime    MEDICATIONS  (PRN):  acetaminophen   Tablet .. 500 milliGRAM(s) Oral every 6 hours PRN Mild Pain (1 - 3), Moderate Pain (4 - 6)  dextrose 40% Gel 15 Gram(s) Oral once PRN Blood Glucose LESS THAN 70 milliGRAM(s)/deciliter  glucagon  Injectable 1 milliGRAM(s) IntraMuscular once PRN Glucose LESS THAN 70 milligrams/deciliter  ondansetron    Tablet 4 milliGRAM(s) Oral every 8 hours PRN Nausea and/or Vomiting      PHYSICAL EXAM  Vital Signs Last 24 Hrs  T(C): 36.7 (14 May 2020 05:14), Max: 36.7 (14 May 2020 05:14)  T(F): 98 (14 May 2020 05:14), Max: 98 (14 May 2020 05:14)  HR: 66 (14 May 2020 05:14) (61 - 66)  BP: 159/70 (14 May 2020 05:14) (150/67 - 159/70)  BP(mean): 95 (13 May 2020 14:36) (95 - 95)  RR: 18 (14 May 2020 05:14) (17 - 18)  SpO2: 99% (14 May 2020 05:14) (98% - 99%)    Due to the nature of this patient’s COVID-19 isolation status (either confirmed or suspected), this note was prepared without a bedside physical examination to prevent spread of infection and to conserve personal protective equipment during this nationwide pandemic. If possible, direct patient communication occurred via electronic measures or telephone conversation. Examination highlights were provided by a bedside nurse wearing personal protective equipment and review of pertinent medical records. Objective data (vital signs, urine output, lab results, imaging studies, medications, etc) were reviewed in detail. Face to face visits and physical examination have been limited only to patients for whom it is required for medical decision making.    LABS:        132<L>  |  98  |  45<H>  ----------------------------<  113<H>  4.8   |  20<L>  |  1.77<H>    Ca    9.8      13 May 2020 06:50  Phos  4.9               Thyroid Stimulating Hormone, Serum: 3.869 uIU/mL ( @ 06:50)  Thyroid Stimulating Hormone, Serum: 3.104 uIU/mL ( @ 06:10)  Thyroid Stimulating Hormone, Serum: 2.712 uIU/mL ( @ 06:31)  Thyroid Stimulating Hormone, Serum: 3.063 uIU/mL ( @ 06:31)  Thyroid Stimulating Hormone, Serum: 3.509 uIU/mL ( @ 06:07)  Thyroid Stimulating Hormone, Serum: 3.325 uIU/mL ( @ 06:17)  Thyroid Stimulating Hormone, Serum: 2.813 uIU/mL ( @ 07:28)  Thyroid Stimulating Hormone, Serum: 2.877 uIU/mL ( @ 00:34)      HbA1C: 6.9 % ( @ 06:59)  7.6 % ( @ 05:58)    CAPILLARY BLOOD GLUCOSE      POCT Blood Glucose.: 141 mg/dL (14 May 2020 11:26)  POCT Blood Glucose.: 185 mg/dL (14 May 2020 07:55)  POCT Blood Glucose.: 201 mg/dL (13 May 2020 21:44)  POCT Blood Glucose.: 149 mg/dL (13 May 2020 16:59)      Insulin Sliding Scale requirements X 24 Hours:    RADIOLOGY & ADDITIONAL TESTS:    A/P 60yMale with hx of DM now with COVID, worsening renal function, moderate hyperglycemia.     1.  DM: type 2  Please continue Lantus 14 units at bedtime  PLease continue Lispro 6  units tid with meals.   Continue lispro moderate dose scale with meals and at bedtime.   -please start on metformin 500mg BID - with breakfast and dinner   Continue consistent carb diet  FSG Goal 100-180  GFR 40 and EF 65%    2.  Hyperlipidemia - goal LDL < 70  - on lipitor 10 mg once daily    3.  Obesity - outpatient medical management.      4.  Hypothyroidism   - TSH on admission 2.8.   - levothyroxine 50mcg discontinued on  to assess necessity as there is no evidence of autoimmune hypothyroidism at this time.   - NO levothyroxine at this time.   - Repeat TSH 3.32 and free T4 1.03 ().   - TSH 3.5 and Free T4 1.01 on 5/3   - TSh was 2.712 and free T4 was 0.98 on   TSh was 3.104 and free T4 was 0.86 on     For discharge, metformin 1000mg BID and glimepiride 1mg before breakfast.    Case seen and discussed with  and updated the primary team.  Pt can follow up at discharge with [facility] by calling  to make an appointment.

## 2020-05-14 NOTE — PROGRESS NOTE ADULT - SUBJECTIVE AND OBJECTIVE BOX
OVERNIGHT EVENTS: No acute events overnight.  SUBJECTIVE: Patient seen and examined at the bedside. Patient denies new complaints at this time. 12-point ROS reviewed and is otherwise negative.    Vital Signs Last 12 Hrs  T(F): 98 (05-14-20 @ 05:14), Max: 98 (05-14-20 @ 05:14)  HR: 66 (05-14-20 @ 05:14) (61 - 66)  BP: 159/70 (05-14-20 @ 05:14) (158/75 - 159/70)  BP(mean): --  RR: 18 (05-14-20 @ 05:14) (17 - 18)  SpO2: 99% (05-14-20 @ 05:14) (99% - 99%)    PHYSICAL EXAM:  General: NAD, resting comfortably in bed  HEENT: NCAT, EOMI, MMM  Neck: supple  Respiratory: CTA, normal respiratory rate/depth/effort  Cardiovascular: normal S1/S2, RRR, no MRG  Vascular: 2+ radial/DP pulses b/l  Abdomen: NT, soft, ileostomy output nonbloody; midline wound dressing c/d/i  Extremities: WWP, no edema    LABS:    05-13    132<L>  |  98  |  45<H>  ----------------------------<  113<H>  4.8   |  20<L>  |  1.77<H>    Ca    9.8      13 May 2020 06:50  Phos  4.9     05-13            RADIOLOGY & ADDITIONAL TESTS: Reviewed    MEDICATIONS  (STANDING):  amLODIPine   Tablet 10 milliGRAM(s) Oral daily  AQUAPHOR (petrolatum Ointment) 1 Application(s) Topical two times a day  atorvastatin 10 milliGRAM(s) Oral at bedtime  baclofen 2.5 milliGRAM(s) Oral every 12 hours  brimonidine 0.2% Ophthalmic Solution 1 Drop(s) Both EYES three times a day  chlorhexidine 2% Cloths 1 Application(s) Topical daily  dextrose 5%. 1000 milliLiter(s) (50 mL/Hr) IV Continuous <Continuous>  dextrose 50% Injectable 12.5 Gram(s) IV Push once  dextrose 50% Injectable 25 Gram(s) IV Push once  dextrose 50% Injectable 25 Gram(s) IV Push once  diphenoxylate/atropine 1 Tablet(s) Oral daily  dorzolamide 2% Ophthalmic Solution 1 Drop(s) Both EYES three times a day  furosemide    Tablet 20 milliGRAM(s) Oral daily  gabapentin 300 milliGRAM(s) Oral every 8 hours  heparin   Injectable 7500 Unit(s) SubCutaneous every 8 hours  insulin glargine Injectable (LANTUS) 14 Unit(s) SubCutaneous at bedtime  insulin lispro (HumaLOG) corrective regimen sliding scale   SubCutaneous Before meals and at bedtime  insulin lispro Injectable (HumaLOG) 6 Unit(s) SubCutaneous three times a day before meals  pantoprazole    Tablet 40 milliGRAM(s) Oral every 12 hours  rifAXIMin 550 milliGRAM(s) Oral two times a day  sodium bicarbonate 650 milliGRAM(s) Oral three times a day  spironolactone 50 milliGRAM(s) Oral daily  traZODone 100 milliGRAM(s) Oral at bedtime    MEDICATIONS  (PRN):  acetaminophen   Tablet .. 500 milliGRAM(s) Oral every 6 hours PRN Mild Pain (1 - 3), Moderate Pain (4 - 6)  dextrose 40% Gel 15 Gram(s) Oral once PRN Blood Glucose LESS THAN 70 milliGRAM(s)/deciliter  glucagon  Injectable 1 milliGRAM(s) IntraMuscular once PRN Glucose LESS THAN 70 milligrams/deciliter  ondansetron    Tablet 4 milliGRAM(s) Oral every 8 hours PRN Nausea and/or Vomiting

## 2020-05-14 NOTE — CHART NOTE - NSCHARTNOTEFT_GEN_A_CORE
Admitting Diagnosis:   Patient is a 60y old  Male who presents with a chief complaint of toxic megacolon, COVID + (06 May 2020 10:01)      PAST MEDICAL & SURGICAL HISTORY:  Anemia  ETOH abuse  HLD (hyperlipidemia)  HTN (hypertension)  DM (diabetes mellitus)  No significant past surgical history      Current Nutrition Order :Low fiber/ C-CHO no snack and DASH diet       PO Intake: Good (%) [ x ]  Fair (50-75%) [   ] Poor (<25%) [   ]    GI Issues: WIth ostomy.Reported ongoing ascites    Pain: none noted    Skin Integrity: surgical wound .No PU    Labs:   05-13    132<L>  |  98  |  45<H>  ----------------------------<  113<H>  4.8   |  20<L>  |  1.77<H>    Ca    9.8      13 May 2020 06:50  Phos  4.9     05-13      CAPILLARY BLOOD GLUCOSE      POCT Blood Glucose.: 141 mg/dL (14 May 2020 11:26)  POCT Blood Glucose.: 185 mg/dL (14 May 2020 07:55)  POCT Blood Glucose.: 201 mg/dL (13 May 2020 21:44)  POCT Blood Glucose.: 149 mg/dL (13 May 2020 16:59)      Medications:  MEDICATIONS  (STANDING):  amLODIPine   Tablet 10 milliGRAM(s) Oral daily  AQUAPHOR (petrolatum Ointment) 1 Application(s) Topical two times a day  atorvastatin 10 milliGRAM(s) Oral at bedtime  baclofen 2.5 milliGRAM(s) Oral every 12 hours  brimonidine 0.2% Ophthalmic Solution 1 Drop(s) Both EYES three times a day  chlorhexidine 2% Cloths 1 Application(s) Topical daily  dextrose 5%. 1000 milliLiter(s) (50 mL/Hr) IV Continuous <Continuous>  dextrose 50% Injectable 12.5 Gram(s) IV Push once  dextrose 50% Injectable 25 Gram(s) IV Push once  dextrose 50% Injectable 25 Gram(s) IV Push once  diphenoxylate/atropine 1 Tablet(s) Oral daily  dorzolamide 2% Ophthalmic Solution 1 Drop(s) Both EYES three times a day  furosemide    Tablet 20 milliGRAM(s) Oral daily  gabapentin 300 milliGRAM(s) Oral every 8 hours  heparin   Injectable 7500 Unit(s) SubCutaneous every 8 hours  insulin glargine Injectable (LANTUS) 14 Unit(s) SubCutaneous at bedtime  insulin lispro (HumaLOG) corrective regimen sliding scale   SubCutaneous Before meals and at bedtime  insulin lispro Injectable (HumaLOG) 6 Unit(s) SubCutaneous three times a day before meals  pantoprazole    Tablet 40 milliGRAM(s) Oral every 12 hours  rifAXIMin 550 milliGRAM(s) Oral two times a day  sodium bicarbonate 650 milliGRAM(s) Oral three times a day  spironolactone 50 milliGRAM(s) Oral daily  traZODone 100 milliGRAM(s) Oral at bedtime    MEDICATIONS  (PRN):  acetaminophen   Tablet .. 500 milliGRAM(s) Oral every 6 hours PRN Mild Pain (1 - 3), Moderate Pain (4 - 6)  dextrose 40% Gel 15 Gram(s) Oral once PRN Blood Glucose LESS THAN 70 milliGRAM(s)/deciliter  glucagon  Injectable 1 milliGRAM(s) IntraMuscular once PRN Glucose LESS THAN 70 milligrams/deciliter  ondansetron    Tablet 4 milliGRAM(s) Oral every 8 hours PRN Nausea and/or Vomiting      Weight:90.7kg  Daily     Daily no updated weights    Weight Change: no updated weights    Estimated energy needs: Based on IBW due to above 120% of IBW and ascites;IBW:67.3kg [id number] and 1-1.2gmprotein(due to MEG and cirrhosis):67.3-81gmprotein and fluids per team.     Subjective: 61y/o male with history of megacolon, HTN, Type 2DM with neuropathy /IVDU /ETOH abuse /LIver Cirrhosis with ascites s/p fall at home found to have toxic megacolon/pneumatosis/S/p ex .lap and subtotal colectomy ,ileostomy /MEG found to be COVID-19+.Pending placement. Eating 80% or better from trays.    Previous Nutrition Diagnosis :Increased nutrient needs r/t increased demand for COVID-19/ostomy demands and surgical demands    Active [ x  ]  Resolved [   ]    If resolved, new PES:     Goal: Meet 80% of needs consistently    Recommendations:1.Updated weights to assess changes    Education: completed    Risk Level: High [   ] Moderate [ x  ] Low [   ]

## 2020-05-14 NOTE — PROGRESS NOTE ADULT - SUBJECTIVE AND OBJECTIVE BOX
SUBJECTIVE: This morning, he feels well; his pain is well-controlled. No acute complaints.    Vital Signs Last 24 Hrs  T(C): 36.7 (14 May 2020 05:14), Max: 36.7 (14 May 2020 05:14)  T(F): 98 (14 May 2020 05:14), Max: 98 (14 May 2020 05:14)  HR: 66 (14 May 2020 05:14) (61 - 66)  BP: 159/70 (14 May 2020 05:14) (150/67 - 159/70)  BP(mean): 95 (13 May 2020 14:36) (95 - 95)  RR: 18 (14 May 2020 05:14) (17 - 18)  SpO2: 99% (14 May 2020 05:14) (98% - 99%)  I&O's Detail    General: NAD, resting comfortably in bed  Pulm: Nonlabored breathing, no respiratory distress  Abd: soft, NT/ND, dressing intact  Extrem: P    LABS:    05-13    132<L>  |  98  |  45<H>  ----------------------------<  113<H>  4.8   |  20<L>  |  1.77<H>    Ca    9.8      13 May 2020 06:50  Phos  4.9     05-13

## 2020-05-14 NOTE — PROGRESS NOTE ADULT - ATTENDING COMMENTS
Glucoses are somewhat higher today, but mostly in a way that indicates a need for increased basal insulin.  Rather than raise the Lantus, however, will start metformin 500 mg BID.  Continue the insulin regimen without change

## 2020-05-14 NOTE — PROGRESS NOTE ADULT - ASSESSMENT
60M PMH HTN, DM + neuropathy, IVDU, ETOH liver cirrhosis, presents to [facility] s/p fall at home; found with toxic megacolon and pneumatosis; now s/p ex lap + subtotal colectomy, ileostomy (3/31). Hospital course further complicated by COVID, MEG, anemia (refused further GI workeup). Pt stable for discharge pending instatement of proper woundcare services.

## 2020-05-14 NOTE — ADVANCED PRACTICE NURSE CONSULT - ASSESSMENT
Surgical wounds to abdomen assessed, both with hypergranulation at edges. Openings in superior wound appear smaller than before, minimal drainage noted to old dressing. Ostomy appliance in place, no leakage. Pt states he still is unable to learn to perform ostomy care since he can't see his ostomy but is able to empty without difficulty. Reinforced need to empty before it gets too full because it will leak.

## 2020-05-15 LAB
GLUCOSE BLDC GLUCOMTR-MCNC: 103 MG/DL — HIGH (ref 70–99)
GLUCOSE BLDC GLUCOMTR-MCNC: 114 MG/DL — HIGH (ref 70–99)
GLUCOSE BLDC GLUCOMTR-MCNC: 159 MG/DL — HIGH (ref 70–99)
GLUCOSE BLDC GLUCOMTR-MCNC: 192 MG/DL — HIGH (ref 70–99)
SARS-COV-2 RNA SPEC QL NAA+PROBE: DETECTED

## 2020-05-15 PROCEDURE — 99231 SBSQ HOSP IP/OBS SF/LOW 25: CPT | Mod: GC

## 2020-05-15 PROCEDURE — ZZZZZ: CPT

## 2020-05-15 RX ORDER — SPIRONOLACTONE 25 MG/1
100 TABLET, FILM COATED ORAL DAILY
Refills: 0 | Status: DISCONTINUED | OUTPATIENT
Start: 2020-05-16 | End: 2020-05-22

## 2020-05-15 RX ORDER — FUROSEMIDE 40 MG
40 TABLET ORAL DAILY
Refills: 0 | Status: DISCONTINUED | OUTPATIENT
Start: 2020-05-16 | End: 2020-05-17

## 2020-05-15 RX ADMIN — BRIMONIDINE TARTRATE 1 DROP(S): 2 SOLUTION/ DROPS OPHTHALMIC at 22:01

## 2020-05-15 RX ADMIN — Medication 2: at 16:53

## 2020-05-15 RX ADMIN — AMLODIPINE BESYLATE 10 MILLIGRAM(S): 2.5 TABLET ORAL at 06:23

## 2020-05-15 RX ADMIN — METFORMIN HYDROCHLORIDE 500 MILLIGRAM(S): 850 TABLET ORAL at 06:24

## 2020-05-15 RX ADMIN — INSULIN GLARGINE 14 UNIT(S): 100 INJECTION, SOLUTION SUBCUTANEOUS at 22:04

## 2020-05-15 RX ADMIN — ATORVASTATIN CALCIUM 10 MILLIGRAM(S): 80 TABLET, FILM COATED ORAL at 22:05

## 2020-05-15 RX ADMIN — GABAPENTIN 300 MILLIGRAM(S): 400 CAPSULE ORAL at 06:23

## 2020-05-15 RX ADMIN — Medication 100 MILLIGRAM(S): at 22:04

## 2020-05-15 RX ADMIN — HEPARIN SODIUM 7500 UNIT(S): 5000 INJECTION INTRAVENOUS; SUBCUTANEOUS at 22:08

## 2020-05-15 RX ADMIN — Medication 1 APPLICATION(S): at 17:07

## 2020-05-15 RX ADMIN — HEPARIN SODIUM 7500 UNIT(S): 5000 INJECTION INTRAVENOUS; SUBCUTANEOUS at 13:03

## 2020-05-15 RX ADMIN — Medication 500 MILLIGRAM(S): at 12:05

## 2020-05-15 RX ADMIN — Medication 1 APPLICATION(S): at 06:24

## 2020-05-15 RX ADMIN — GABAPENTIN 300 MILLIGRAM(S): 400 CAPSULE ORAL at 22:05

## 2020-05-15 RX ADMIN — Medication 6 UNIT(S): at 08:28

## 2020-05-15 RX ADMIN — BRIMONIDINE TARTRATE 1 DROP(S): 2 SOLUTION/ DROPS OPHTHALMIC at 13:05

## 2020-05-15 RX ADMIN — CHLORHEXIDINE GLUCONATE 1 APPLICATION(S): 213 SOLUTION TOPICAL at 12:00

## 2020-05-15 RX ADMIN — Medication 6 UNIT(S): at 12:34

## 2020-05-15 RX ADMIN — Medication 2: at 08:29

## 2020-05-15 RX ADMIN — PANTOPRAZOLE SODIUM 40 MILLIGRAM(S): 20 TABLET, DELAYED RELEASE ORAL at 06:24

## 2020-05-15 RX ADMIN — DORZOLAMIDE HYDROCHLORIDE 1 DROP(S): 20 SOLUTION/ DROPS OPHTHALMIC at 13:06

## 2020-05-15 RX ADMIN — Medication 2.5 MILLIGRAM(S): at 17:06

## 2020-05-15 RX ADMIN — Medication 2.5 MILLIGRAM(S): at 06:25

## 2020-05-15 RX ADMIN — Medication 650 MILLIGRAM(S): at 13:03

## 2020-05-15 RX ADMIN — Medication 1 TABLET(S): at 12:06

## 2020-05-15 RX ADMIN — DORZOLAMIDE HYDROCHLORIDE 1 DROP(S): 20 SOLUTION/ DROPS OPHTHALMIC at 21:01

## 2020-05-15 RX ADMIN — METFORMIN HYDROCHLORIDE 500 MILLIGRAM(S): 850 TABLET ORAL at 17:06

## 2020-05-15 RX ADMIN — Medication 650 MILLIGRAM(S): at 22:05

## 2020-05-15 RX ADMIN — Medication 6 UNIT(S): at 16:54

## 2020-05-15 RX ADMIN — HEPARIN SODIUM 7500 UNIT(S): 5000 INJECTION INTRAVENOUS; SUBCUTANEOUS at 06:24

## 2020-05-15 RX ADMIN — PANTOPRAZOLE SODIUM 40 MILLIGRAM(S): 20 TABLET, DELAYED RELEASE ORAL at 17:06

## 2020-05-15 RX ADMIN — Medication 650 MILLIGRAM(S): at 06:24

## 2020-05-15 RX ADMIN — GABAPENTIN 300 MILLIGRAM(S): 400 CAPSULE ORAL at 13:03

## 2020-05-15 RX ADMIN — BRIMONIDINE TARTRATE 1 DROP(S): 2 SOLUTION/ DROPS OPHTHALMIC at 06:25

## 2020-05-15 RX ADMIN — DORZOLAMIDE HYDROCHLORIDE 1 DROP(S): 20 SOLUTION/ DROPS OPHTHALMIC at 06:25

## 2020-05-15 RX ADMIN — Medication 500 MILLIGRAM(S): at 18:21

## 2020-05-15 RX ADMIN — Medication 20 MILLIGRAM(S): at 06:27

## 2020-05-15 RX ADMIN — SPIRONOLACTONE 50 MILLIGRAM(S): 25 TABLET, FILM COATED ORAL at 06:23

## 2020-05-15 NOTE — PROGRESS NOTE ADULT - PROBLEM SELECTOR PLAN 2
decompensated cirrhosis c/b ascites likely 2/2 alcohol abuse  -SBP ruled out, hepatic encephalopathy poa now resolved  -CT abd/pelvis with persistent large ascites and peritonitis; bilateral pleural effusions and pericardial effusion  -paracentesis on 4/10 with negative cytology  -repeat paracentesis 4/19, unable to aspirate significant amount of fluid   -abd u/s 5/3 small ascites   -Rifaximin 550mg bid for hepatic encephalopathy   -Lasix 10mg po qd and Spironolactone 25mg po qd for ascites  -holding Lactulose given high ostomy output  -pt refused video capsule endoscopy this admission   -maximum Tylenol 2g qd  -screening for HCC outpatient with hepatology follow-up decompensated cirrhosis c/b ascites likely 2/2 alcohol abuse  -SBP ruled out, hepatic encephalopathy poa now resolved  -CT abd/pelvis with persistent large ascites and peritonitis; bilateral pleural effusions and pericardial effusion  -paracentesis on 4/10 with negative cytology  -repeat paracentesis 4/19, unable to aspirate significant amount of fluid   -abd u/s 5/3 small ascites   -Rifaximin 550mg bid for hepatic encephalopathy   -Increase to Lasix 40mg po qd and Spironolactone 100mg po qd for ascites  -Check AM BMP  -holding Lactulose given high ostomy output  -pt refused video capsule endoscopy this admission   -maximum Tylenol 2g qd  -No focal hepatic lesions seen on CT A/P w/IV contrast or abdominal U/S to suggest HCC

## 2020-05-15 NOTE — PROGRESS NOTE ADULT - ASSESSMENT
60M PMH HTN, DM + neuropathy, IVDU, ETOH liver cirrhosis, presents to [facility] s/p fall at home; found with toxic megacolon and pneumatosis; now s/p ex lap + subtotal colectomy, ileostomy (3/31). Hospital course further complicated by COVID, MEG, anemia (refused further GI workeup). Pt stable for discharge pending instatement of proper woundcare services.

## 2020-05-15 NOTE — PROGRESS NOTE ADULT - PROBLEM SELECTOR PLAN 3
now s/p 3/31/20 ex lap, subtotal colectomy+ileostomy  course c/b fever and purulent wound infection (4/9), s/p vanc/zosyn.   -s/p laparotomy, no longer with wound vac, daily dressings  -ileostomy in place draining brown stool with no signs of bleeding  -lomotil- low dose to prevent constipation/ hepatic encephalopthy  -general surgery following, appreciate recs  -woundcare now s/p 3/31/20 ex lap, subtotal colectomy+ileostomy  course c/b fever and purulent wound infection (4/9), s/p vanc/zosyn.   -s/p laparotomy, no longer with wound vac, daily dressings  -ileostomy in place draining brown stool with no signs of bleeding  -lomotil- low dose for high ostomy output  -general surgery following, appreciate recs  -woundcare

## 2020-05-15 NOTE — PROGRESS NOTE ADULT - ATTENDING COMMENTS
Post-prandial glucoses still elevated, mostly after supper.  Metformin started, but did not receive his first dose until this morning.  To continue the current regimen for now

## 2020-05-15 NOTE — ADVANCED PRACTICE NURSE CONSULT - ASSESSMENT
New 1 3/4" [place] 2 piece appliance placed over stoma, slight erythema noted to peristomal site. Stoma powder and skin barrier wipes used to protect area. Silver Nitrate sticks applied to edges of both wounds on abdomen to reduce hypergranulation, pt tolerated without problems. Triad cream applied to wound beds then both covered with ABD pads.

## 2020-05-15 NOTE — PROGRESS NOTE ADULT - PROBLEM SELECTOR PLAN 1
4/12 COVID (+); 5/15 repeat PCR (+)  -s/p hydroxychloroquine + azithromycin 4/13-4/16  -stable respiratory status on room air, continue to monitor respiratory status  -continue supportive care  -contact/droplet precautions

## 2020-05-15 NOTE — PROGRESS NOTE ADULT - SUBJECTIVE AND OBJECTIVE BOX
INTERVAL HPI/OVERNIGHT EVENTS:    Patient is a 60y old  Male who presents with a chief complaint of toxic megacolon, COVID + (06 May 2020 10:01)  Spoke to the primary team taking care of the patient and patient via phone  No acute events.   Saturating well on RA. appetite is good. Can't remember what he ate-is having memory issues  dispo pending     FSG & Insulin received:  Yesterday:  pre-dinner fs , 6 units lispro  bedtime fs, 14  lantus   units + 4 units lispro SS  Today:  pre-breakfast fs, 6 nutritional lispro   units+  2  units lispro SS. Metformin 500mg initiated  pre-lunch fsg:     Pt reports the following symptoms:  CONSTITUTIONAL:  Negative fever or chills, feels well, good appetite  CARDIOVASCULAR:  Negative for chest pain or palpitations  RESPIRATORY:  Negative for cough, wheezing, or SOB   GASTROINTESTINAL:  Negative for nausea, vomiting, diarrhea, constipation, or abdominal pain  NEUROLOGIC:  No headache, confusion, dizziness, lightheadedness    MEDICATIONS  (STANDING):  amLODIPine   Tablet 10 milliGRAM(s) Oral daily  AQUAPHOR (petrolatum Ointment) 1 Application(s) Topical two times a day  atorvastatin 10 milliGRAM(s) Oral at bedtime  baclofen 2.5 milliGRAM(s) Oral every 12 hours  brimonidine 0.2% Ophthalmic Solution 1 Drop(s) Both EYES three times a day  chlorhexidine 2% Cloths 1 Application(s) Topical daily  dextrose 5%. 1000 milliLiter(s) (50 mL/Hr) IV Continuous <Continuous>  dextrose 50% Injectable 12.5 Gram(s) IV Push once  dextrose 50% Injectable 25 Gram(s) IV Push once  dextrose 50% Injectable 25 Gram(s) IV Push once  diphenoxylate/atropine 1 Tablet(s) Oral daily  dorzolamide 2% Ophthalmic Solution 1 Drop(s) Both EYES three times a day  gabapentin 300 milliGRAM(s) Oral every 8 hours  heparin   Injectable 7500 Unit(s) SubCutaneous every 8 hours  insulin glargine Injectable (LANTUS) 14 Unit(s) SubCutaneous at bedtime  insulin lispro (HumaLOG) corrective regimen sliding scale   SubCutaneous Before meals and at bedtime  insulin lispro Injectable (HumaLOG) 6 Unit(s) SubCutaneous three times a day before meals  metFORMIN 500 milliGRAM(s) Oral two times a day  pantoprazole    Tablet 40 milliGRAM(s) Oral every 12 hours  rifAXIMin 550 milliGRAM(s) Oral two times a day  sodium bicarbonate 650 milliGRAM(s) Oral three times a day  traZODone 100 milliGRAM(s) Oral at bedtime    MEDICATIONS  (PRN):  acetaminophen   Tablet .. 500 milliGRAM(s) Oral every 6 hours PRN Mild Pain (1 - 3), Moderate Pain (4 - 6)  dextrose 40% Gel 15 Gram(s) Oral once PRN Blood Glucose LESS THAN 70 milliGRAM(s)/deciliter  glucagon  Injectable 1 milliGRAM(s) IntraMuscular once PRN Glucose LESS THAN 70 milligrams/deciliter  ondansetron    Tablet 4 milliGRAM(s) Oral every 8 hours PRN Nausea and/or Vomiting      PHYSICAL EXAM  Vital Signs Last 24 Hrs  T(C): 36.7 (15 May 2020 06:14), Max: 36.7 (14 May 2020 20:42)  T(F): 98.1 (15 May 2020 06:14), Max: 98.1 (14 May 2020 20:42)  HR: 65 (15 May 2020 06:14) (63 - 70)  BP: 167/72 (15 May 2020 06:14) (145/72 - 167/72)  BP(mean): --  RR: 18 (15 May 2020 06:14) (18 - 18)  SpO2: 99% (15 May 2020 06:14) (98% - 99%)    Due to the nature of this patient’s COVID-19 isolation status (either confirmed or suspected), this note was prepared without a bedside physical examination to prevent spread of infection and to conserve personal protective equipment during this nationwide pandemic. If possible, direct patient communication occurred via electronic measures or telephone conversation. Examination highlights were provided by a bedside nurse wearing personal protective equipment and review of pertinent medical records. Objective data (vital signs, urine output, lab results, imaging studies, medications, etc) were reviewed in detail. Face to face visits and physical examination have been limited only to patients for whom it is required for medical decision making.    LABS:              Thyroid Stimulating Hormone, Serum: 3.869 uIU/mL ( @ 06:50)  Thyroid Stimulating Hormone, Serum: 3.104 uIU/mL ( @ 06:10)  Thyroid Stimulating Hormone, Serum: 2.712 uIU/mL ( @ 06:31)  Thyroid Stimulating Hormone, Serum: 3.063 uIU/mL ( @ 06:31)  Thyroid Stimulating Hormone, Serum: 3.509 uIU/mL ( @ 06:07)  Thyroid Stimulating Hormone, Serum: 3.325 uIU/mL ( @ 06:17)  Thyroid Stimulating Hormone, Serum: 2.813 uIU/mL ( @ 07:28)  Thyroid Stimulating Hormone, Serum: 2.877 uIU/mL ( @ 00:34)      HbA1C: 6.9 % ( @ 06:59)  7.6 % ( @ 05:58)    CAPILLARY BLOOD GLUCOSE      POCT Blood Glucose.: 159 mg/dL (15 May 2020 07:53)  POCT Blood Glucose.: 224 mg/dL (14 May 2020 21:23)  POCT Blood Glucose.: 147 mg/dL (14 May 2020 17:01)  POCT Blood Glucose.: 141 mg/dL (14 May 2020 11:26)      A/P 60yMale with hx of DM now with COVID, worsening renal function, moderate hyperglycemia.     1.  DM: type 2  Please continue Lantus 14 units at bedtime  PLease continue Lispro 6  units tid with meals.   Continue lispro moderate dose scale with meals and at bedtime.   Conitnue metformin 500mg BID - with breakfast and dinner   Continue consistent carb diet  FSG Goal 100-180  GFR 40 and EF 65%    2.  Hyperlipidemia - goal LDL < 70  - on lipitor 10 mg once daily    3.  Obesity - outpatient medical management.      4.  Hypothyroidism   - TSH on admission 2.8.   - levothyroxine 50mcg discontinued on  to assess necessity as there is no evidence of autoimmune hypothyroidism at this time.   - NO levothyroxine at this time.   - Repeat TSH 3.32 and free T4 1.03 ().   - TSH 3.5 and Free T4 1.01 on 5/3   - TSh was 2.712 and free T4 was 0.98 on   TSh was 3.104 and free T4 was 0.86 on     For discharge, metformin 1000mg BID and glimepiride 1mg before breakfast.    Case seen and discussed with  and updated the primary team.  Pt can follow up at discharge with [facility] by calling  to make an appointment. INTERVAL HPI/OVERNIGHT EVENTS:    Patient is a 60y old  Male who presents with a chief complaint of toxic megacolon, COVID + (06 May 2020 10:01)  Spoke to the primary team taking care of the patient and patient via phone  No acute events.   Saturating well on RA. appetite is good. Can't remember what he ate-is having memory issues  dispo pending     FSG & Insulin received:  Yesterday:  pre-dinner fs , 6 units lispro  bedtime fs, 14  lantus   units + 4 units lispro SS  Today:  pre-breakfast fs, 6 nutritional lispro   units+  2  units lispro SS. Metformin 500mg initiated  pre-lunch fs.    Pt reports the following symptoms:  CONSTITUTIONAL:  Negative fever or chills, feels well, good appetite  CARDIOVASCULAR:  Negative for chest pain or palpitations  RESPIRATORY:  Negative for cough, wheezing, or SOB   GASTROINTESTINAL:  Negative for nausea, vomiting, diarrhea, constipation, or abdominal pain  NEUROLOGIC:  No headache, confusion, dizziness, lightheadedness    MEDICATIONS  (STANDING):  amLODIPine   Tablet 10 milliGRAM(s) Oral daily  AQUAPHOR (petrolatum Ointment) 1 Application(s) Topical two times a day  atorvastatin 10 milliGRAM(s) Oral at bedtime  baclofen 2.5 milliGRAM(s) Oral every 12 hours  brimonidine 0.2% Ophthalmic Solution 1 Drop(s) Both EYES three times a day  chlorhexidine 2% Cloths 1 Application(s) Topical daily  dextrose 5%. 1000 milliLiter(s) (50 mL/Hr) IV Continuous <Continuous>  dextrose 50% Injectable 12.5 Gram(s) IV Push once  dextrose 50% Injectable 25 Gram(s) IV Push once  dextrose 50% Injectable 25 Gram(s) IV Push once  diphenoxylate/atropine 1 Tablet(s) Oral daily  dorzolamide 2% Ophthalmic Solution 1 Drop(s) Both EYES three times a day  gabapentin 300 milliGRAM(s) Oral every 8 hours  heparin   Injectable 7500 Unit(s) SubCutaneous every 8 hours  insulin glargine Injectable (LANTUS) 14 Unit(s) SubCutaneous at bedtime  insulin lispro (HumaLOG) corrective regimen sliding scale   SubCutaneous Before meals and at bedtime  insulin lispro Injectable (HumaLOG) 6 Unit(s) SubCutaneous three times a day before meals  metFORMIN 500 milliGRAM(s) Oral two times a day  pantoprazole    Tablet 40 milliGRAM(s) Oral every 12 hours  rifAXIMin 550 milliGRAM(s) Oral two times a day  sodium bicarbonate 650 milliGRAM(s) Oral three times a day  traZODone 100 milliGRAM(s) Oral at bedtime    MEDICATIONS  (PRN):  acetaminophen   Tablet .. 500 milliGRAM(s) Oral every 6 hours PRN Mild Pain (1 - 3), Moderate Pain (4 - 6)  dextrose 40% Gel 15 Gram(s) Oral once PRN Blood Glucose LESS THAN 70 milliGRAM(s)/deciliter  glucagon  Injectable 1 milliGRAM(s) IntraMuscular once PRN Glucose LESS THAN 70 milligrams/deciliter  ondansetron    Tablet 4 milliGRAM(s) Oral every 8 hours PRN Nausea and/or Vomiting      PHYSICAL EXAM  Vital Signs Last 24 Hrs  T(C): 36.7 (15 May 2020 06:14), Max: 36.7 (14 May 2020 20:42)  T(F): 98.1 (15 May 2020 06:14), Max: 98.1 (14 May 2020 20:42)  HR: 65 (15 May 2020 06:14) (63 - 70)  BP: 167/72 (15 May 2020 06:14) (145/72 - 167/72)  BP(mean): --  RR: 18 (15 May 2020 06:14) (18 - 18)  SpO2: 99% (15 May 2020 06:14) (98% - 99%)    Due to the nature of this patient’s COVID-19 isolation status (either confirmed or suspected), this note was prepared without a bedside physical examination to prevent spread of infection and to conserve personal protective equipment during this nationwide pandemic. If possible, direct patient communication occurred via electronic measures or telephone conversation. Examination highlights were provided by a bedside nurse wearing personal protective equipment and review of pertinent medical records. Objective data (vital signs, urine output, lab results, imaging studies, medications, etc) were reviewed in detail. Face to face visits and physical examination have been limited only to patients for whom it is required for medical decision making.    LABS:              Thyroid Stimulating Hormone, Serum: 3.869 uIU/mL ( @ 06:50)  Thyroid Stimulating Hormone, Serum: 3.104 uIU/mL ( @ 06:10)  Thyroid Stimulating Hormone, Serum: 2.712 uIU/mL ( @ 06:31)  Thyroid Stimulating Hormone, Serum: 3.063 uIU/mL ( @ 06:31)  Thyroid Stimulating Hormone, Serum: 3.509 uIU/mL ( @ 06:07)  Thyroid Stimulating Hormone, Serum: 3.325 uIU/mL ( @ 06:17)  Thyroid Stimulating Hormone, Serum: 2.813 uIU/mL ( @ 07:28)  Thyroid Stimulating Hormone, Serum: 2.877 uIU/mL ( @ 00:34)      HbA1C: 6.9 % ( @ 06:59)  7.6 % ( @ 05:58)    CAPILLARY BLOOD GLUCOSE      POCT Blood Glucose.: 159 mg/dL (15 May 2020 07:53)  POCT Blood Glucose.: 224 mg/dL (14 May 2020 21:23)  POCT Blood Glucose.: 147 mg/dL (14 May 2020 17:01)  POCT Blood Glucose.: 141 mg/dL (14 May 2020 11:26)      A/P 60yMale with hx of DM now with COVID, worsening renal function, moderate hyperglycemia.     1.  DM: type 2  Please continue Lantus 14 units at bedtime  PLease continue Lispro 6  units tid with meals.   Continue lispro moderate dose scale with meals and at bedtime.   Conitnue metformin 500mg BID - with breakfast and dinner   Continue consistent carb diet  FSG Goal 100-180  GFR 40 and EF 65%    2.  Hyperlipidemia - goal LDL < 70  - on lipitor 10 mg once daily    3.  Obesity - outpatient medical management.      4.  Hypothyroidism   - TSH on admission 2.8.   - levothyroxine 50mcg discontinued on  to assess necessity as there is no evidence of autoimmune hypothyroidism at this time.   - NO levothyroxine at this time.   - Repeat TSH 3.32 and free T4 1.03 ().   - TSH 3.5 and Free T4 1.01 on 5/3   - TSh was 2.712 and free T4 was 0.98 on   TSh was 3.104 and free T4 was 0.86 on     For discharge, metformin 1000mg BID and glimepiride 1mg before breakfast.    Case seen and discussed with  and updated the primary team.  Pt can follow up at discharge with [facility] by calling  to make an appointment.

## 2020-05-15 NOTE — PROGRESS NOTE ADULT - ATTENDING COMMENTS
Patient seen and examined at bedside. Agree with the history, physical exam, assessment, and plan as outlined above with the following addendum. Patient feels well today, no current complaints. Will increase to Lasix 40 mg daily and Spironolactone 100 mg daily for ascites. Monitor BP and BMP for electrolyte abnormalities. Dispo pending.

## 2020-05-15 NOTE — PROGRESS NOTE ADULT - PROBLEM SELECTOR PLAN 7
Type 2 DM, endocrinology following   -lantus 14U qhs  -lispro 6U TID pre-meal   -mISS  -f/u discharge regimen per endo    #hypothyroidism   -TSH 2.813; TPO and antithyroid Abs neg   -levothyroxine 50mcg discontinued on 4/29 to assess necessity as there is no evidence of autoimmune hypothyroidism at this time Type 2 DM, endocrinology following   -lantus 14U qhs  -lispro 6U TID pre-meal   -mISS  -metformin 500mg bid  -continue to appreciate endo recs    #hypothyroidism   -TSH 2.813; TPO and antithyroid Abs neg   -levothyroxine 50mcg discontinued on 4/29 to assess necessity as there is no evidence of autoimmune hypothyroidism at this time

## 2020-05-15 NOTE — PROGRESS NOTE ADULT - SUBJECTIVE AND OBJECTIVE BOX
OVERNIGHT EVENTS: No acute events overnight.  SUBJECTIVE: Patient seen and examined at the bedside. Patient denies new complaints at this time. Expresses frustration regarding prolonged hospital course. 12-point ROS reviewed and is otherwise negative.    Vital Signs Last 12 Hrs  T(F): 98.1 (05-15-20 @ 06:14), Max: 98.1 (05-14-20 @ 20:42)  HR: 65 (05-15-20 @ 06:14) (63 - 65)  BP: 167/72 (05-15-20 @ 06:14) (154/69 - 167/72)  BP(mean): --  RR: 18 (05-15-20 @ 06:14) (18 - 18)  SpO2: 99% (05-15-20 @ 06:14) (98% - 99%)    PHYSICAL EXAM:  General: NAD, resting comfortably in bed, AAOx3  HEENT: NCAT, EOMI, MMM  Neck: supple  Respiratory: CTA, normal respiratory rate/depth/effort  Cardiovascular: normal S1/S2, RRR, no MRG  Vascular: 2+ radial/DP pulses b/l  Abdomen: NT, soft, distended, ileostomy output nonbloody; midline wound dressing c/d/i  Extremities: WWP, no edema  LABS: labwork not indicated at this time    RADIOLOGY & ADDITIONAL TESTS: Reviewed    MEDICATIONS  (STANDING):  amLODIPine   Tablet 10 milliGRAM(s) Oral daily  AQUAPHOR (petrolatum Ointment) 1 Application(s) Topical two times a day  atorvastatin 10 milliGRAM(s) Oral at bedtime  baclofen 2.5 milliGRAM(s) Oral every 12 hours  brimonidine 0.2% Ophthalmic Solution 1 Drop(s) Both EYES three times a day  chlorhexidine 2% Cloths 1 Application(s) Topical daily  dextrose 5%. 1000 milliLiter(s) (50 mL/Hr) IV Continuous <Continuous>  dextrose 50% Injectable 12.5 Gram(s) IV Push once  dextrose 50% Injectable 25 Gram(s) IV Push once  dextrose 50% Injectable 25 Gram(s) IV Push once  diphenoxylate/atropine 1 Tablet(s) Oral daily  dorzolamide 2% Ophthalmic Solution 1 Drop(s) Both EYES three times a day  furosemide    Tablet 20 milliGRAM(s) Oral daily  gabapentin 300 milliGRAM(s) Oral every 8 hours  heparin   Injectable 7500 Unit(s) SubCutaneous every 8 hours  insulin glargine Injectable (LANTUS) 14 Unit(s) SubCutaneous at bedtime  insulin lispro (HumaLOG) corrective regimen sliding scale   SubCutaneous Before meals and at bedtime  insulin lispro Injectable (HumaLOG) 6 Unit(s) SubCutaneous three times a day before meals  metFORMIN 500 milliGRAM(s) Oral two times a day  pantoprazole    Tablet 40 milliGRAM(s) Oral every 12 hours  rifAXIMin 550 milliGRAM(s) Oral two times a day  sodium bicarbonate 650 milliGRAM(s) Oral three times a day  spironolactone 50 milliGRAM(s) Oral daily  traZODone 100 milliGRAM(s) Oral at bedtime    MEDICATIONS  (PRN):  acetaminophen   Tablet .. 500 milliGRAM(s) Oral every 6 hours PRN Mild Pain (1 - 3), Moderate Pain (4 - 6)  dextrose 40% Gel 15 Gram(s) Oral once PRN Blood Glucose LESS THAN 70 milliGRAM(s)/deciliter  glucagon  Injectable 1 milliGRAM(s) IntraMuscular once PRN Glucose LESS THAN 70 milligrams/deciliter  ondansetron    Tablet 4 milliGRAM(s) Oral every 8 hours PRN Nausea and/or Vomiting

## 2020-05-16 LAB
ALBUMIN SERPL ELPH-MCNC: 4 G/DL — SIGNIFICANT CHANGE UP (ref 3.3–5)
ALP SERPL-CCNC: 126 U/L — HIGH (ref 40–120)
ALT FLD-CCNC: 19 U/L — SIGNIFICANT CHANGE UP (ref 10–45)
ANION GAP SERPL CALC-SCNC: 14 MMOL/L — SIGNIFICANT CHANGE UP (ref 5–17)
AST SERPL-CCNC: 15 U/L — SIGNIFICANT CHANGE UP (ref 10–40)
BASOPHILS # BLD AUTO: 0.04 K/UL — SIGNIFICANT CHANGE UP (ref 0–0.2)
BASOPHILS NFR BLD AUTO: 0.5 % — SIGNIFICANT CHANGE UP (ref 0–2)
BILIRUB SERPL-MCNC: 0.2 MG/DL — SIGNIFICANT CHANGE UP (ref 0.2–1.2)
BUN SERPL-MCNC: 43 MG/DL — HIGH (ref 7–23)
CALCIUM SERPL-MCNC: 9.9 MG/DL — SIGNIFICANT CHANGE UP (ref 8.4–10.5)
CHLORIDE SERPL-SCNC: 96 MMOL/L — SIGNIFICANT CHANGE UP (ref 96–108)
CO2 SERPL-SCNC: 19 MMOL/L — LOW (ref 22–31)
CREAT SERPL-MCNC: 1.53 MG/DL — HIGH (ref 0.5–1.3)
CRP SERPL-MCNC: 2.43 MG/DL — HIGH (ref 0–0.4)
D DIMER BLD IA.RAPID-MCNC: 787 NG/ML DDU — HIGH
EOSINOPHIL # BLD AUTO: 0.27 K/UL — SIGNIFICANT CHANGE UP (ref 0–0.5)
EOSINOPHIL NFR BLD AUTO: 3.2 % — SIGNIFICANT CHANGE UP (ref 0–6)
FERRITIN SERPL-MCNC: 638 NG/ML — HIGH (ref 30–400)
GLUCOSE BLDC GLUCOMTR-MCNC: 153 MG/DL — HIGH (ref 70–99)
GLUCOSE BLDC GLUCOMTR-MCNC: 165 MG/DL — HIGH (ref 70–99)
GLUCOSE BLDC GLUCOMTR-MCNC: 166 MG/DL — HIGH (ref 70–99)
GLUCOSE BLDC GLUCOMTR-MCNC: 187 MG/DL — HIGH (ref 70–99)
GLUCOSE SERPL-MCNC: 175 MG/DL — HIGH (ref 70–99)
HCT VFR BLD CALC: 28.6 % — LOW (ref 39–50)
HGB BLD-MCNC: 9.3 G/DL — LOW (ref 13–17)
IMM GRANULOCYTES NFR BLD AUTO: 1.3 % — SIGNIFICANT CHANGE UP (ref 0–1.5)
LYMPHOCYTES # BLD AUTO: 2.4 K/UL — SIGNIFICANT CHANGE UP (ref 1–3.3)
LYMPHOCYTES # BLD AUTO: 28.6 % — SIGNIFICANT CHANGE UP (ref 13–44)
MAGNESIUM SERPL-MCNC: 1.6 MG/DL — SIGNIFICANT CHANGE UP (ref 1.6–2.6)
MCHC RBC-ENTMCNC: 29 PG — SIGNIFICANT CHANGE UP (ref 27–34)
MCHC RBC-ENTMCNC: 32.5 GM/DL — SIGNIFICANT CHANGE UP (ref 32–36)
MCV RBC AUTO: 89.1 FL — SIGNIFICANT CHANGE UP (ref 80–100)
MONOCYTES # BLD AUTO: 0.77 K/UL — SIGNIFICANT CHANGE UP (ref 0–0.9)
MONOCYTES NFR BLD AUTO: 9.2 % — SIGNIFICANT CHANGE UP (ref 2–14)
NEUTROPHILS # BLD AUTO: 4.81 K/UL — SIGNIFICANT CHANGE UP (ref 1.8–7.4)
NEUTROPHILS NFR BLD AUTO: 57.2 % — SIGNIFICANT CHANGE UP (ref 43–77)
NRBC # BLD: 0 /100 WBCS — SIGNIFICANT CHANGE UP (ref 0–0)
OSMOLALITY SERPL: 285 MOSMOL/KG — SIGNIFICANT CHANGE UP (ref 280–301)
PHOSPHATE SERPL-MCNC: 4.3 MG/DL — SIGNIFICANT CHANGE UP (ref 2.5–4.5)
PLATELET # BLD AUTO: 266 K/UL — SIGNIFICANT CHANGE UP (ref 150–400)
POTASSIUM SERPL-MCNC: 5.1 MMOL/L — SIGNIFICANT CHANGE UP (ref 3.5–5.3)
POTASSIUM SERPL-SCNC: 5.1 MMOL/L — SIGNIFICANT CHANGE UP (ref 3.5–5.3)
PROT SERPL-MCNC: 9 G/DL — HIGH (ref 6–8.3)
RBC # BLD: 3.21 M/UL — LOW (ref 4.2–5.8)
RBC # FLD: 14.7 % — HIGH (ref 10.3–14.5)
SODIUM SERPL-SCNC: 129 MMOL/L — LOW (ref 135–145)
URATE SERPL-MCNC: 8.4 MG/DL — SIGNIFICANT CHANGE UP (ref 3.4–8.8)
WBC # BLD: 8.4 K/UL — SIGNIFICANT CHANGE UP (ref 3.8–10.5)
WBC # FLD AUTO: 8.4 K/UL — SIGNIFICANT CHANGE UP (ref 3.8–10.5)

## 2020-05-16 PROCEDURE — 99232 SBSQ HOSP IP/OBS MODERATE 35: CPT | Mod: GC

## 2020-05-16 RX ORDER — MAGNESIUM SULFATE 500 MG/ML
2 VIAL (ML) INJECTION ONCE
Refills: 0 | Status: COMPLETED | OUTPATIENT
Start: 2020-05-16 | End: 2020-05-16

## 2020-05-16 RX ADMIN — Medication 2.5 MILLIGRAM(S): at 17:03

## 2020-05-16 RX ADMIN — Medication 650 MILLIGRAM(S): at 06:08

## 2020-05-16 RX ADMIN — Medication 500 MILLIGRAM(S): at 11:49

## 2020-05-16 RX ADMIN — Medication 2: at 21:30

## 2020-05-16 RX ADMIN — Medication 6 UNIT(S): at 16:48

## 2020-05-16 RX ADMIN — HEPARIN SODIUM 7500 UNIT(S): 5000 INJECTION INTRAVENOUS; SUBCUTANEOUS at 15:01

## 2020-05-16 RX ADMIN — AMLODIPINE BESYLATE 10 MILLIGRAM(S): 2.5 TABLET ORAL at 06:08

## 2020-05-16 RX ADMIN — METFORMIN HYDROCHLORIDE 500 MILLIGRAM(S): 850 TABLET ORAL at 06:07

## 2020-05-16 RX ADMIN — HEPARIN SODIUM 7500 UNIT(S): 5000 INJECTION INTRAVENOUS; SUBCUTANEOUS at 06:13

## 2020-05-16 RX ADMIN — GABAPENTIN 300 MILLIGRAM(S): 400 CAPSULE ORAL at 22:33

## 2020-05-16 RX ADMIN — METFORMIN HYDROCHLORIDE 500 MILLIGRAM(S): 850 TABLET ORAL at 17:04

## 2020-05-16 RX ADMIN — BRIMONIDINE TARTRATE 1 DROP(S): 2 SOLUTION/ DROPS OPHTHALMIC at 06:09

## 2020-05-16 RX ADMIN — Medication 650 MILLIGRAM(S): at 22:33

## 2020-05-16 RX ADMIN — Medication 50 GRAM(S): at 09:36

## 2020-05-16 RX ADMIN — INSULIN GLARGINE 14 UNIT(S): 100 INJECTION, SOLUTION SUBCUTANEOUS at 21:30

## 2020-05-16 RX ADMIN — Medication 6 UNIT(S): at 13:10

## 2020-05-16 RX ADMIN — Medication 2: at 16:48

## 2020-05-16 RX ADMIN — PANTOPRAZOLE SODIUM 40 MILLIGRAM(S): 20 TABLET, DELAYED RELEASE ORAL at 06:07

## 2020-05-16 RX ADMIN — GABAPENTIN 300 MILLIGRAM(S): 400 CAPSULE ORAL at 15:01

## 2020-05-16 RX ADMIN — Medication 2: at 08:15

## 2020-05-16 RX ADMIN — Medication 40 MILLIGRAM(S): at 06:15

## 2020-05-16 RX ADMIN — DORZOLAMIDE HYDROCHLORIDE 1 DROP(S): 20 SOLUTION/ DROPS OPHTHALMIC at 22:48

## 2020-05-16 RX ADMIN — GABAPENTIN 300 MILLIGRAM(S): 400 CAPSULE ORAL at 06:07

## 2020-05-16 RX ADMIN — Medication 650 MILLIGRAM(S): at 15:00

## 2020-05-16 RX ADMIN — BRIMONIDINE TARTRATE 1 DROP(S): 2 SOLUTION/ DROPS OPHTHALMIC at 15:02

## 2020-05-16 RX ADMIN — Medication 100 MILLIGRAM(S): at 22:34

## 2020-05-16 RX ADMIN — DORZOLAMIDE HYDROCHLORIDE 1 DROP(S): 20 SOLUTION/ DROPS OPHTHALMIC at 15:01

## 2020-05-16 RX ADMIN — Medication 1 APPLICATION(S): at 06:08

## 2020-05-16 RX ADMIN — Medication 1 APPLICATION(S): at 17:04

## 2020-05-16 RX ADMIN — PANTOPRAZOLE SODIUM 40 MILLIGRAM(S): 20 TABLET, DELAYED RELEASE ORAL at 16:49

## 2020-05-16 RX ADMIN — Medication 6 UNIT(S): at 08:14

## 2020-05-16 RX ADMIN — SPIRONOLACTONE 100 MILLIGRAM(S): 25 TABLET, FILM COATED ORAL at 06:19

## 2020-05-16 RX ADMIN — ATORVASTATIN CALCIUM 10 MILLIGRAM(S): 80 TABLET, FILM COATED ORAL at 22:41

## 2020-05-16 RX ADMIN — Medication 2: at 13:09

## 2020-05-16 RX ADMIN — HEPARIN SODIUM 7500 UNIT(S): 5000 INJECTION INTRAVENOUS; SUBCUTANEOUS at 22:34

## 2020-05-16 RX ADMIN — DORZOLAMIDE HYDROCHLORIDE 1 DROP(S): 20 SOLUTION/ DROPS OPHTHALMIC at 06:09

## 2020-05-16 RX ADMIN — Medication 2.5 MILLIGRAM(S): at 06:08

## 2020-05-16 RX ADMIN — Medication 1 TABLET(S): at 15:00

## 2020-05-16 RX ADMIN — BRIMONIDINE TARTRATE 1 DROP(S): 2 SOLUTION/ DROPS OPHTHALMIC at 22:48

## 2020-05-16 NOTE — PROGRESS NOTE ADULT - ATTENDING COMMENTS
Patient reporting abdominal discomfort well localized to the site of the surgical scar. No complaints of dyspnea or chest pain. Continues to sat well on room air. Crp and d-dimer downtrending; ferritin stable.     -elevated BUN/Cr possibly 2/2 diuresis; will f/u renal  -hyponatremia; asymptomatic, likely 2/2 diuresis; f/u renal  -hgb remains stable; will continue to monitor; maintain active T/S

## 2020-05-16 NOTE — PROGRESS NOTE ADULT - PROBLEM SELECTOR PLAN 7
Type 2 DM, endocrinology following   -lantus 14U qhs  -lispro 6U TID pre-meal   -mISS  -metformin 500mg bid  -continue to appreciate endo recs    #hypothyroidism   -TSH 2.813; TPO and antithyroid Abs neg   -levothyroxine 50mcg discontinued on 4/29 to assess necessity as there is no evidence of autoimmune hypothyroidism at this time

## 2020-05-16 NOTE — PROGRESS NOTE ADULT - PROBLEM SELECTOR PLAN 3
now s/p 3/31/20 ex lap, subtotal colectomy+ileostomy  course c/b fever and purulent wound infection (4/9), s/p vanc/zosyn.   -s/p laparotomy, no longer with wound vac, daily dressings  -ileostomy in place draining brown stool with no signs of bleeding  -lomotil- low dose for high ostomy output  -general surgery following, appreciate recs  -woundcare

## 2020-05-16 NOTE — PROGRESS NOTE ADULT - SUBJECTIVE AND OBJECTIVE BOX
OVERNIGHT EVENTS:    SUBJECTIVE / INTERVAL HPI: Patient seen and examined at bedside.     VITAL SIGNS:  Vital Signs Last 24 Hrs  T(C): 36.8 (16 May 2020 05:57), Max: 36.8 (16 May 2020 05:57)  T(F): 98.3 (16 May 2020 05:57), Max: 98.3 (16 May 2020 05:57)  HR: 64 (16 May 2020 05:57) (61 - 64)  BP: 144/71 (16 May 2020 05:57) (135/65 - 145/68)  BP(mean): --  RR: 18 (16 May 2020 05:57) (16 - 18)  SpO2: 98% (16 May 2020 05:57) (98% - 99%)    PHYSICAL EXAM:    General: WDWN  HEENT: NC/AT; PERRL, anicteric sclera; MMM  Neck: supple  Cardiovascular: +S1/S2; RRR  Respiratory: CTA B/L; no W/R/R  Gastrointestinal: soft, NT/ND; +BSx4  Extremities: WWP; no edema, clubbing or cyanosis  Vascular: 2+ radial, DP/PT pulses B/L  Neurological: AAOx3; no focal deficits    MEDICATIONS:  MEDICATIONS  (STANDING):  amLODIPine   Tablet 10 milliGRAM(s) Oral daily  AQUAPHOR (petrolatum Ointment) 1 Application(s) Topical two times a day  atorvastatin 10 milliGRAM(s) Oral at bedtime  baclofen 2.5 milliGRAM(s) Oral every 12 hours  brimonidine 0.2% Ophthalmic Solution 1 Drop(s) Both EYES three times a day  chlorhexidine 2% Cloths 1 Application(s) Topical daily  dextrose 5%. 1000 milliLiter(s) (50 mL/Hr) IV Continuous <Continuous>  dextrose 50% Injectable 12.5 Gram(s) IV Push once  dextrose 50% Injectable 25 Gram(s) IV Push once  dextrose 50% Injectable 25 Gram(s) IV Push once  diphenoxylate/atropine 1 Tablet(s) Oral daily  dorzolamide 2% Ophthalmic Solution 1 Drop(s) Both EYES three times a day  furosemide    Tablet 40 milliGRAM(s) Oral daily  gabapentin 300 milliGRAM(s) Oral every 8 hours  heparin   Injectable 7500 Unit(s) SubCutaneous every 8 hours  insulin glargine Injectable (LANTUS) 14 Unit(s) SubCutaneous at bedtime  insulin lispro (HumaLOG) corrective regimen sliding scale   SubCutaneous Before meals and at bedtime  insulin lispro Injectable (HumaLOG) 6 Unit(s) SubCutaneous three times a day before meals  metFORMIN 500 milliGRAM(s) Oral two times a day  pantoprazole    Tablet 40 milliGRAM(s) Oral every 12 hours  rifAXIMin 550 milliGRAM(s) Oral two times a day  sodium bicarbonate 650 milliGRAM(s) Oral three times a day  spironolactone 100 milliGRAM(s) Oral daily  traZODone 100 milliGRAM(s) Oral at bedtime    MEDICATIONS  (PRN):  acetaminophen   Tablet .. 500 milliGRAM(s) Oral every 6 hours PRN Mild Pain (1 - 3), Moderate Pain (4 - 6)  dextrose 40% Gel 15 Gram(s) Oral once PRN Blood Glucose LESS THAN 70 milliGRAM(s)/deciliter  glucagon  Injectable 1 milliGRAM(s) IntraMuscular once PRN Glucose LESS THAN 70 milligrams/deciliter  ondansetron    Tablet 4 milliGRAM(s) Oral every 8 hours PRN Nausea and/or Vomiting      ALLERGIES:  Allergies    No Known Allergies    Intolerances        LABS:              CAPILLARY BLOOD GLUCOSE      POCT Blood Glucose.: 103 mg/dL (15 May 2020 21:49)      RADIOLOGY & ADDITIONAL TESTS: Reviewed. OVERNIGHT EVENTS: NAEO    SUBJECTIVE / INTERVAL HPI: Patient seen and examined at bedside. Sitting up comfortably in bed watching TV, w/o any new complaints. Breathing comfortably and saturating well on RA. Labs this AM to follow up those from 5/13 demonstrating persistent hyponatremia and MEG; nephro consulted and serum/urine studies ordered as recommended. CW/SW reporting that the patient will likely stay in-house until wound care needs have been minimized, as they have exhausted all options for placement.    VITAL SIGNS:  Vital Signs Last 24 Hrs  T(C): 36.8 (16 May 2020 05:57), Max: 36.8 (16 May 2020 05:57)  T(F): 98.3 (16 May 2020 05:57), Max: 98.3 (16 May 2020 05:57)  HR: 64 (16 May 2020 05:57) (61 - 64)  BP: 144/71 (16 May 2020 05:57) (135/65 - 145/68)  BP(mean): --  RR: 18 (16 May 2020 05:57) (16 - 18)  SpO2: 98% (16 May 2020 05:57) (98% - 99%)    PHYSICAL EXAM:    General: NAD, resting comfortably in bed, AAOx3  HEENT: NCAT, EOMI, MMM  Neck: supple  Respiratory: not in respiratory distress, normal respiratory rate/depth/effort  Cardiovascular: normal S1/S2, RRR, no MRG  Vascular: 2+ radial/DP pulses b/l  Abdomen: NT, soft, distended, ileostomy output nonbloody; midline wound dressing c/d/i  Extremities: WWP, no edema    MEDICATIONS:  MEDICATIONS  (STANDING):  amLODIPine   Tablet 10 milliGRAM(s) Oral daily  AQUAPHOR (petrolatum Ointment) 1 Application(s) Topical two times a day  atorvastatin 10 milliGRAM(s) Oral at bedtime  baclofen 2.5 milliGRAM(s) Oral every 12 hours  brimonidine 0.2% Ophthalmic Solution 1 Drop(s) Both EYES three times a day  chlorhexidine 2% Cloths 1 Application(s) Topical daily  dextrose 5%. 1000 milliLiter(s) (50 mL/Hr) IV Continuous <Continuous>  dextrose 50% Injectable 12.5 Gram(s) IV Push once  dextrose 50% Injectable 25 Gram(s) IV Push once  dextrose 50% Injectable 25 Gram(s) IV Push once  diphenoxylate/atropine 1 Tablet(s) Oral daily  dorzolamide 2% Ophthalmic Solution 1 Drop(s) Both EYES three times a day  furosemide    Tablet 40 milliGRAM(s) Oral daily  gabapentin 300 milliGRAM(s) Oral every 8 hours  heparin   Injectable 7500 Unit(s) SubCutaneous every 8 hours  insulin glargine Injectable (LANTUS) 14 Unit(s) SubCutaneous at bedtime  insulin lispro (HumaLOG) corrective regimen sliding scale   SubCutaneous Before meals and at bedtime  insulin lispro Injectable (HumaLOG) 6 Unit(s) SubCutaneous three times a day before meals  metFORMIN 500 milliGRAM(s) Oral two times a day  pantoprazole    Tablet 40 milliGRAM(s) Oral every 12 hours  rifAXIMin 550 milliGRAM(s) Oral two times a day  sodium bicarbonate 650 milliGRAM(s) Oral three times a day  spironolactone 100 milliGRAM(s) Oral daily  traZODone 100 milliGRAM(s) Oral at bedtime    MEDICATIONS  (PRN):  acetaminophen   Tablet .. 500 milliGRAM(s) Oral every 6 hours PRN Mild Pain (1 - 3), Moderate Pain (4 - 6)  dextrose 40% Gel 15 Gram(s) Oral once PRN Blood Glucose LESS THAN 70 milliGRAM(s)/deciliter  glucagon  Injectable 1 milliGRAM(s) IntraMuscular once PRN Glucose LESS THAN 70 milligrams/deciliter  ondansetron    Tablet 4 milliGRAM(s) Oral every 8 hours PRN Nausea and/or Vomiting      ALLERGIES:  Allergies    No Known Allergies    Intolerances        LABS:              CAPILLARY BLOOD GLUCOSE      POCT Blood Glucose.: 103 mg/dL (15 May 2020 21:49)      RADIOLOGY & ADDITIONAL TESTS: Reviewed.

## 2020-05-16 NOTE — PROGRESS NOTE ADULT - ASSESSMENT
60M PMH HTN, DM + neuropathy, IVDU, ETOH liver cirrhosis, presents to [facility] s/p fall at home; found with toxic megacolon and pneumatosis; now s/p ex lap + subtotal colectomy, ileostomy (3/31). Hospital course further complicated by COVID, MEG, anemia (refused further GI workeup). Pt stable for discharge pending instatement of proper woundcare services.

## 2020-05-16 NOTE — PROGRESS NOTE ADULT - PROBLEM SELECTOR PLAN 2
decompensated cirrhosis c/b ascites likely 2/2 alcohol abuse  -SBP ruled out, hepatic encephalopathy poa now resolved  -CT abd/pelvis with persistent large ascites and peritonitis; bilateral pleural effusions and pericardial effusion  -paracentesis on 4/10 with negative cytology  -repeat paracentesis 4/19, unable to aspirate significant amount of fluid   -abd u/s 5/3 small ascites   -Rifaximin 550mg bid for hepatic encephalopathy   -Increase to Lasix 40mg po qd and Spironolactone 100mg po qd for ascites  -Check AM BMP  -holding Lactulose given high ostomy output  -pt refused video capsule endoscopy this admission   -maximum Tylenol 2g qd  -No focal hepatic lesions seen on CT A/P w/IV contrast or abdominal U/S to suggest HCC

## 2020-05-17 LAB
ALBUMIN SERPL ELPH-MCNC: 3.7 G/DL — SIGNIFICANT CHANGE UP (ref 3.3–5)
ALP SERPL-CCNC: 104 U/L — SIGNIFICANT CHANGE UP (ref 40–120)
ALT FLD-CCNC: 17 U/L — SIGNIFICANT CHANGE UP (ref 10–45)
ANION GAP SERPL CALC-SCNC: 15 MMOL/L — SIGNIFICANT CHANGE UP (ref 5–17)
AST SERPL-CCNC: 14 U/L — SIGNIFICANT CHANGE UP (ref 10–40)
BASOPHILS # BLD AUTO: 0.05 K/UL — SIGNIFICANT CHANGE UP (ref 0–0.2)
BASOPHILS NFR BLD AUTO: 0.6 % — SIGNIFICANT CHANGE UP (ref 0–2)
BILIRUB SERPL-MCNC: 0.2 MG/DL — SIGNIFICANT CHANGE UP (ref 0.2–1.2)
BUN SERPL-MCNC: 47 MG/DL — HIGH (ref 7–23)
CALCIUM SERPL-MCNC: 9.7 MG/DL — SIGNIFICANT CHANGE UP (ref 8.4–10.5)
CHLORIDE SERPL-SCNC: 94 MMOL/L — LOW (ref 96–108)
CO2 SERPL-SCNC: 19 MMOL/L — LOW (ref 22–31)
CREAT SERPL-MCNC: 1.69 MG/DL — HIGH (ref 0.5–1.3)
CRP SERPL-MCNC: 1.96 MG/DL — HIGH (ref 0–0.4)
D DIMER BLD IA.RAPID-MCNC: 711 NG/ML DDU — HIGH
EOSINOPHIL # BLD AUTO: 0.25 K/UL — SIGNIFICANT CHANGE UP (ref 0–0.5)
EOSINOPHIL NFR BLD AUTO: 3.2 % — SIGNIFICANT CHANGE UP (ref 0–6)
FERRITIN SERPL-MCNC: 618 NG/ML — HIGH (ref 30–400)
GLUCOSE BLDC GLUCOMTR-MCNC: 159 MG/DL — HIGH (ref 70–99)
GLUCOSE BLDC GLUCOMTR-MCNC: 171 MG/DL — HIGH (ref 70–99)
GLUCOSE BLDC GLUCOMTR-MCNC: 190 MG/DL — HIGH (ref 70–99)
GLUCOSE BLDC GLUCOMTR-MCNC: 210 MG/DL — HIGH (ref 70–99)
GLUCOSE SERPL-MCNC: 142 MG/DL — HIGH (ref 70–99)
HCT VFR BLD CALC: 26.3 % — LOW (ref 39–50)
HGB BLD-MCNC: 9 G/DL — LOW (ref 13–17)
IMM GRANULOCYTES NFR BLD AUTO: 1.6 % — HIGH (ref 0–1.5)
LYMPHOCYTES # BLD AUTO: 2.17 K/UL — SIGNIFICANT CHANGE UP (ref 1–3.3)
LYMPHOCYTES # BLD AUTO: 28.1 % — SIGNIFICANT CHANGE UP (ref 13–44)
MAGNESIUM SERPL-MCNC: 1.7 MG/DL — SIGNIFICANT CHANGE UP (ref 1.6–2.6)
MCHC RBC-ENTMCNC: 29.7 PG — SIGNIFICANT CHANGE UP (ref 27–34)
MCHC RBC-ENTMCNC: 34.2 GM/DL — SIGNIFICANT CHANGE UP (ref 32–36)
MCV RBC AUTO: 86.8 FL — SIGNIFICANT CHANGE UP (ref 80–100)
MONOCYTES # BLD AUTO: 0.82 K/UL — SIGNIFICANT CHANGE UP (ref 0–0.9)
MONOCYTES NFR BLD AUTO: 10.6 % — SIGNIFICANT CHANGE UP (ref 2–14)
NEUTROPHILS # BLD AUTO: 4.3 K/UL — SIGNIFICANT CHANGE UP (ref 1.8–7.4)
NEUTROPHILS NFR BLD AUTO: 55.9 % — SIGNIFICANT CHANGE UP (ref 43–77)
NRBC # BLD: 0 /100 WBCS — SIGNIFICANT CHANGE UP (ref 0–0)
OSMOLALITY UR: 210 MOSMOL/KG — SIGNIFICANT CHANGE UP (ref 100–650)
PHOSPHATE SERPL-MCNC: 4.8 MG/DL — HIGH (ref 2.5–4.5)
PLATELET # BLD AUTO: 253 K/UL — SIGNIFICANT CHANGE UP (ref 150–400)
POTASSIUM SERPL-MCNC: 4.6 MMOL/L — SIGNIFICANT CHANGE UP (ref 3.5–5.3)
POTASSIUM SERPL-SCNC: 4.6 MMOL/L — SIGNIFICANT CHANGE UP (ref 3.5–5.3)
PROT SERPL-MCNC: 8.5 G/DL — HIGH (ref 6–8.3)
RBC # BLD: 3.03 M/UL — LOW (ref 4.2–5.8)
RBC # FLD: 14.9 % — HIGH (ref 10.3–14.5)
SODIUM SERPL-SCNC: 128 MMOL/L — LOW (ref 135–145)
SODIUM UR-SCNC: 47 MMOL/L — SIGNIFICANT CHANGE UP
WBC # BLD: 7.71 K/UL — SIGNIFICANT CHANGE UP (ref 3.8–10.5)
WBC # FLD AUTO: 7.71 K/UL — SIGNIFICANT CHANGE UP (ref 3.8–10.5)

## 2020-05-17 PROCEDURE — 99233 SBSQ HOSP IP/OBS HIGH 50: CPT | Mod: GC

## 2020-05-17 RX ORDER — SODIUM CHLORIDE 9 MG/ML
500 INJECTION INTRAMUSCULAR; INTRAVENOUS; SUBCUTANEOUS ONCE
Refills: 0 | Status: COMPLETED | OUTPATIENT
Start: 2020-05-17 | End: 2020-05-17

## 2020-05-17 RX ORDER — FUROSEMIDE 40 MG
20 TABLET ORAL DAILY
Refills: 0 | Status: DISCONTINUED | OUTPATIENT
Start: 2020-05-18 | End: 2020-05-19

## 2020-05-17 RX ADMIN — GABAPENTIN 300 MILLIGRAM(S): 400 CAPSULE ORAL at 06:45

## 2020-05-17 RX ADMIN — BRIMONIDINE TARTRATE 1 DROP(S): 2 SOLUTION/ DROPS OPHTHALMIC at 13:34

## 2020-05-17 RX ADMIN — METFORMIN HYDROCHLORIDE 500 MILLIGRAM(S): 850 TABLET ORAL at 06:45

## 2020-05-17 RX ADMIN — Medication 6 UNIT(S): at 12:00

## 2020-05-17 RX ADMIN — Medication 40 MILLIGRAM(S): at 06:45

## 2020-05-17 RX ADMIN — BRIMONIDINE TARTRATE 1 DROP(S): 2 SOLUTION/ DROPS OPHTHALMIC at 06:54

## 2020-05-17 RX ADMIN — Medication 1 TABLET(S): at 12:06

## 2020-05-17 RX ADMIN — DORZOLAMIDE HYDROCHLORIDE 1 DROP(S): 20 SOLUTION/ DROPS OPHTHALMIC at 22:33

## 2020-05-17 RX ADMIN — DORZOLAMIDE HYDROCHLORIDE 1 DROP(S): 20 SOLUTION/ DROPS OPHTHALMIC at 06:55

## 2020-05-17 RX ADMIN — PANTOPRAZOLE SODIUM 40 MILLIGRAM(S): 20 TABLET, DELAYED RELEASE ORAL at 17:20

## 2020-05-17 RX ADMIN — Medication 6 UNIT(S): at 08:13

## 2020-05-17 RX ADMIN — PANTOPRAZOLE SODIUM 40 MILLIGRAM(S): 20 TABLET, DELAYED RELEASE ORAL at 06:45

## 2020-05-17 RX ADMIN — Medication 650 MILLIGRAM(S): at 13:10

## 2020-05-17 RX ADMIN — Medication 2: at 08:13

## 2020-05-17 RX ADMIN — Medication 2.5 MILLIGRAM(S): at 17:19

## 2020-05-17 RX ADMIN — Medication 4: at 22:25

## 2020-05-17 RX ADMIN — Medication 100 MILLIGRAM(S): at 22:25

## 2020-05-17 RX ADMIN — METFORMIN HYDROCHLORIDE 500 MILLIGRAM(S): 850 TABLET ORAL at 17:19

## 2020-05-17 RX ADMIN — Medication 1 APPLICATION(S): at 06:55

## 2020-05-17 RX ADMIN — INSULIN GLARGINE 14 UNIT(S): 100 INJECTION, SOLUTION SUBCUTANEOUS at 22:26

## 2020-05-17 RX ADMIN — HEPARIN SODIUM 7500 UNIT(S): 5000 INJECTION INTRAVENOUS; SUBCUTANEOUS at 13:10

## 2020-05-17 RX ADMIN — BRIMONIDINE TARTRATE 1 DROP(S): 2 SOLUTION/ DROPS OPHTHALMIC at 22:33

## 2020-05-17 RX ADMIN — Medication 6 UNIT(S): at 17:18

## 2020-05-17 RX ADMIN — AMLODIPINE BESYLATE 10 MILLIGRAM(S): 2.5 TABLET ORAL at 06:45

## 2020-05-17 RX ADMIN — Medication 2: at 17:19

## 2020-05-17 RX ADMIN — ATORVASTATIN CALCIUM 10 MILLIGRAM(S): 80 TABLET, FILM COATED ORAL at 22:25

## 2020-05-17 RX ADMIN — GABAPENTIN 300 MILLIGRAM(S): 400 CAPSULE ORAL at 13:10

## 2020-05-17 RX ADMIN — Medication 1 APPLICATION(S): at 17:52

## 2020-05-17 RX ADMIN — DORZOLAMIDE HYDROCHLORIDE 1 DROP(S): 20 SOLUTION/ DROPS OPHTHALMIC at 13:34

## 2020-05-17 RX ADMIN — Medication 650 MILLIGRAM(S): at 06:48

## 2020-05-17 RX ADMIN — Medication 2: at 12:00

## 2020-05-17 RX ADMIN — GABAPENTIN 300 MILLIGRAM(S): 400 CAPSULE ORAL at 22:25

## 2020-05-17 RX ADMIN — SPIRONOLACTONE 100 MILLIGRAM(S): 25 TABLET, FILM COATED ORAL at 06:46

## 2020-05-17 RX ADMIN — HEPARIN SODIUM 7500 UNIT(S): 5000 INJECTION INTRAVENOUS; SUBCUTANEOUS at 06:42

## 2020-05-17 RX ADMIN — Medication 2.5 MILLIGRAM(S): at 06:44

## 2020-05-17 RX ADMIN — SODIUM CHLORIDE 500 MILLILITER(S): 9 INJECTION INTRAMUSCULAR; INTRAVENOUS; SUBCUTANEOUS at 14:48

## 2020-05-17 RX ADMIN — HEPARIN SODIUM 7500 UNIT(S): 5000 INJECTION INTRAVENOUS; SUBCUTANEOUS at 22:24

## 2020-05-17 RX ADMIN — Medication 650 MILLIGRAM(S): at 22:24

## 2020-05-17 NOTE — PROGRESS NOTE ADULT - SUBJECTIVE AND OBJECTIVE BOX
INTERVAL HPI/OVERNIGHT EVENTS:    VITAL SIGNS:  T(C): 36.9 (05-17-20 @ 06:27), Max: 36.9 (05-17-20 @ 06:27)  T(F): 98.5 (05-17-20 @ 06:27), Max: 98.5 (05-17-20 @ 06:27)  HR: 61 (05-17-20 @ 06:27) (61 - 69)  BP: 135/57 (05-17-20 @ 06:27) (135/57 - 159/67)  BP(mean): --  RR: 19 (05-17-20 @ 06:27) (16 - 19)  SpO2: 100% (05-17-20 @ 06:27) (98% - 100%)  Wt(kg): --    PHYSICAL EXAM:    Constitutional: WDWN resting comfortably in bed; NAD  Head: NC/AT  Eyes: PERRL, EOMI, anicteric sclera  ENT: no nasal discharge; uvula midline, no oropharyngeal erythema or exudates; MMM  Neck: supple; no JVD or thyromegaly  Respiratory: CTA B/L; no W/R/R, no retractions  Cardiac: +S1/S2; RRR; no M/R/G; PMI non-displaced  Gastrointestinal: soft, NT/ND; no rebound or guarding; +BSx4  Genitourinary: normal external genitalia  Back: spine midline, no bony tenderness or step-offs; no CVAT B/L  Extremities: WWP, no clubbing or cyanosis; no peripheral edema  Musculoskeletal: NROM x4; no joint swelling, tenderness or erythema  Vascular: 2+ radial, femoral, DP/PT pulses B/L  Dermatologic: skin warm, dry and intact; no rashes, wounds, or scars  Lymphatic: no submandibular or cervical LAD  Neurologic: AAOx3; CNII-XII grossly intact; no focal deficits  Psychiatric: affect and characteristics of appearance, verbalizations, behaviors are appropriate    MEDICATIONS  (STANDING):  amLODIPine   Tablet 10 milliGRAM(s) Oral daily  AQUAPHOR (petrolatum Ointment) 1 Application(s) Topical two times a day  atorvastatin 10 milliGRAM(s) Oral at bedtime  baclofen 2.5 milliGRAM(s) Oral every 12 hours  brimonidine 0.2% Ophthalmic Solution 1 Drop(s) Both EYES three times a day  chlorhexidine 2% Cloths 1 Application(s) Topical daily  dextrose 5%. 1000 milliLiter(s) (50 mL/Hr) IV Continuous <Continuous>  dextrose 50% Injectable 12.5 Gram(s) IV Push once  dextrose 50% Injectable 25 Gram(s) IV Push once  dextrose 50% Injectable 25 Gram(s) IV Push once  diphenoxylate/atropine 1 Tablet(s) Oral daily  dorzolamide 2% Ophthalmic Solution 1 Drop(s) Both EYES three times a day  furosemide    Tablet 40 milliGRAM(s) Oral daily  gabapentin 300 milliGRAM(s) Oral every 8 hours  heparin   Injectable 7500 Unit(s) SubCutaneous every 8 hours  insulin glargine Injectable (LANTUS) 14 Unit(s) SubCutaneous at bedtime  insulin lispro (HumaLOG) corrective regimen sliding scale   SubCutaneous Before meals and at bedtime  insulin lispro Injectable (HumaLOG) 6 Unit(s) SubCutaneous three times a day before meals  metFORMIN 500 milliGRAM(s) Oral two times a day  pantoprazole    Tablet 40 milliGRAM(s) Oral every 12 hours  rifAXIMin 550 milliGRAM(s) Oral two times a day  sodium bicarbonate 650 milliGRAM(s) Oral three times a day  spironolactone 100 milliGRAM(s) Oral daily  traZODone 100 milliGRAM(s) Oral at bedtime    MEDICATIONS  (PRN):  acetaminophen   Tablet .. 500 milliGRAM(s) Oral every 6 hours PRN Mild Pain (1 - 3), Moderate Pain (4 - 6)  dextrose 40% Gel 15 Gram(s) Oral once PRN Blood Glucose LESS THAN 70 milliGRAM(s)/deciliter  glucagon  Injectable 1 milliGRAM(s) IntraMuscular once PRN Glucose LESS THAN 70 milligrams/deciliter  ondansetron    Tablet 4 milliGRAM(s) Oral every 8 hours PRN Nausea and/or Vomiting      Allergies    No Known Allergies    Intolerances        LABS:                        9.0    7.71  )-----------( 253      ( 17 May 2020 07:19 )             26.3     05-17    128<L>  |  94<L>  |  47<H>  ----------------------------<  142<H>  4.6   |  19<L>  |  1.69<H>    Ca    9.7      17 May 2020 07:19  Phos  4.8     05-17  Mg     1.7     05-17    TPro  8.5<H>  /  Alb  3.7  /  TBili  0.2  /  DBili  x   /  AST  14  /  ALT  17  /  AlkPhos  104  05-17          RADIOLOGY & ADDITIONAL TESTS: INTERVAL HPI/OVERNIGHT EVENTS: no overnight events; patients denies CP, dyspnea, f/c/n/v/d/c. Still reporting mild abdominal pain near the surgery site.    VITAL SIGNS:  T(C): 36.9 (05-17-20 @ 06:27), Max: 36.9 (05-17-20 @ 06:27)  T(F): 98.5 (05-17-20 @ 06:27), Max: 98.5 (05-17-20 @ 06:27)  HR: 61 (05-17-20 @ 06:27) (61 - 69)  BP: 135/57 (05-17-20 @ 06:27) (135/57 - 159/67)  BP(mean): --  RR: 19 (05-17-20 @ 06:27) (16 - 19)  SpO2: 100% (05-17-20 @ 06:27) (98% - 100%)  Wt(kg): --    PHYSICAL EXAM:    General: NAD, resting in bed, AAOx3  HEENT: NCAT, EOMI, MMM  Neck: supple  Respiratory: not in respiratory distress, normal respiratory rate/depth/effort  Cardiovascular: normal S1/S2, RRR, no MRG  Vascular: 2+ radial/DP pulses b/l  Abdomen: NT, soft, distended, ileostomy output nonbloody; midline wound dressing c/d/i  Extremities: WWP, no edema    MEDICATIONS  (STANDING):  amLODIPine   Tablet 10 milliGRAM(s) Oral daily  AQUAPHOR (petrolatum Ointment) 1 Application(s) Topical two times a day  atorvastatin 10 milliGRAM(s) Oral at bedtime  baclofen 2.5 milliGRAM(s) Oral every 12 hours  brimonidine 0.2% Ophthalmic Solution 1 Drop(s) Both EYES three times a day  chlorhexidine 2% Cloths 1 Application(s) Topical daily  dextrose 5%. 1000 milliLiter(s) (50 mL/Hr) IV Continuous <Continuous>  dextrose 50% Injectable 12.5 Gram(s) IV Push once  dextrose 50% Injectable 25 Gram(s) IV Push once  dextrose 50% Injectable 25 Gram(s) IV Push once  diphenoxylate/atropine 1 Tablet(s) Oral daily  dorzolamide 2% Ophthalmic Solution 1 Drop(s) Both EYES three times a day  furosemide    Tablet 40 milliGRAM(s) Oral daily  gabapentin 300 milliGRAM(s) Oral every 8 hours  heparin   Injectable 7500 Unit(s) SubCutaneous every 8 hours  insulin glargine Injectable (LANTUS) 14 Unit(s) SubCutaneous at bedtime  insulin lispro (HumaLOG) corrective regimen sliding scale   SubCutaneous Before meals and at bedtime  insulin lispro Injectable (HumaLOG) 6 Unit(s) SubCutaneous three times a day before meals  metFORMIN 500 milliGRAM(s) Oral two times a day  pantoprazole    Tablet 40 milliGRAM(s) Oral every 12 hours  rifAXIMin 550 milliGRAM(s) Oral two times a day  sodium bicarbonate 650 milliGRAM(s) Oral three times a day  spironolactone 100 milliGRAM(s) Oral daily  traZODone 100 milliGRAM(s) Oral at bedtime    MEDICATIONS  (PRN):  acetaminophen   Tablet .. 500 milliGRAM(s) Oral every 6 hours PRN Mild Pain (1 - 3), Moderate Pain (4 - 6)  dextrose 40% Gel 15 Gram(s) Oral once PRN Blood Glucose LESS THAN 70 milliGRAM(s)/deciliter  glucagon  Injectable 1 milliGRAM(s) IntraMuscular once PRN Glucose LESS THAN 70 milligrams/deciliter  ondansetron    Tablet 4 milliGRAM(s) Oral every 8 hours PRN Nausea and/or Vomiting      Allergies    No Known Allergies    Intolerances        LABS:                        9.0    7.71  )-----------( 253      ( 17 May 2020 07:19 )             26.3     05-17    128<L>  |  94<L>  |  47<H>  ----------------------------<  142<H>  4.6   |  19<L>  |  1.69<H>    Ca    9.7      17 May 2020 07:19  Phos  4.8     05-17  Mg     1.7     05-17    TPro  8.5<H>  /  Alb  3.7  /  TBili  0.2  /  DBili  x   /  AST  14  /  ALT  17  /  AlkPhos  104  05-17          RADIOLOGY & ADDITIONAL TESTS:

## 2020-05-17 NOTE — PROGRESS NOTE ADULT - SUBJECTIVE AND OBJECTIVE BOX
Patient is a 60y Male seen and evaluated at bedside.   pt seen and discussed with team   labs noted--> Na @ 128  urine lytes pending      acetaminophen   Tablet .. 500 every 6 hours PRN  amLODIPine   Tablet 10 daily  AQUAPHOR (petrolatum Ointment) 1 two times a day  atorvastatin 10 at bedtime  baclofen 2.5 every 12 hours  brimonidine 0.2% Ophthalmic Solution 1 three times a day  chlorhexidine 2% Cloths 1 daily  dextrose 40% Gel 15 once PRN  dextrose 5%. 1000 <Continuous>  dextrose 50% Injectable 12.5 once  dextrose 50% Injectable 25 once  dextrose 50% Injectable 25 once  diphenoxylate/atropine 1 daily  dorzolamide 2% Ophthalmic Solution 1 three times a day  furosemide    Tablet 40 daily  gabapentin 300 every 8 hours  glucagon  Injectable 1 once PRN  heparin   Injectable 7500 every 8 hours  insulin glargine Injectable (LANTUS) 14 at bedtime  insulin lispro (HumaLOG) corrective regimen sliding scale  Before meals and at bedtime  insulin lispro Injectable (HumaLOG) 6 three times a day before meals  metFORMIN 500 two times a day  ondansetron    Tablet 4 every 8 hours PRN  pantoprazole    Tablet 40 every 12 hours  rifAXIMin 550 two times a day  sodium bicarbonate 650 three times a day  spironolactone 100 daily  traZODone 100 at bedtime      Allergies    No Known Allergies    Intolerances        T(C): , Max: 36.9 (05-17-20 @ 06:27)  T(F): , Max: 98.5 (05-17-20 @ 06:27)  HR: 61 (05-17-20 @ 06:27)  BP: 135/57 (05-17-20 @ 06:27)  BP(mean): --  RR: 19 (05-17-20 @ 06:27)  SpO2: 100% (05-17-20 @ 06:27)  Wt(kg): --    05-16 @ 07:01  -  05-17 @ 07:00  --------------------------------------------------------  IN: 0 mL / OUT: 450 mL / NET: -450 mL          Review of Systems:  deferred due to covid 19      PHYSICAL EXAM:  deferred due to covid 19          LABS:                        9.0    7.71  )-----------( 253      ( 17 May 2020 07:19 )             26.3     05-17    128<L>  |  94<L>  |  47<H>  ----------------------------<  142<H>  4.6   |  19<L>  |  1.69<H>    Ca    9.7      17 May 2020 07:19  Phos  4.8     05-17  Mg     1.7     05-17    TPro  8.5<H>  /  Alb  3.7  /  TBili  0.2  /  DBili  x   /  AST  14  /  ALT  17  /  AlkPhos  104  05-17    Osmolality, Serum: 285 mosmol/kg [280 - 301] (05-16 @ 16:45)  Uric Acid, Serum: 8.4 mg/dL [3.4 - 8.8] (05-16 @ 16:45)              RADIOLOGY & ADDITIONAL STUDIES:

## 2020-05-17 NOTE — PROGRESS NOTE ADULT - ASSESSMENT
60M PMH HTN, DM + neuropathy, IVDU, ETOH liver cirrhosis, presents to [facility] s/p fall at home; found with toxic megacolon and pneumatosis; now s/p ex lap + subtotal colectomy, ileostomy (3/31). Hospital course further complicated by COVID, MEG, anemia (refused further GI work up). Pt stable for discharge pending instatement of proper wound care services.

## 2020-05-17 NOTE — PROGRESS NOTE ADULT - ASSESSMENT
61 y/o M with PMH of HTN, DM complicated by neuropathy, IVDU, ETOH liver cirrhosis, who presents to [facility] for fall at home. At [facility] patient reported that he takes lactulose at home daily and has daily BM, but he had no BM for 5 days prior to admission. 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 Patient underwent ex lap and subtotal colectomy, and ileostomy on 3/31.  COVID +    Hyponatremia   mild-129  awaiting urine lytes- urine osm, urine yosvany  tsh, serum uric acid, serum osm noted  will continue monitoring     MEG /ckd  resolved  cr stable @ 1.69  renal diet     anemia  transfuse prn Hb < 7  iron panel noted--> recommend po fesol  325 mg daily    acidosis  continue with sodium bicarbonate po

## 2020-05-17 NOTE — PROGRESS NOTE ADULT - PROBLEM SELECTOR PLAN 4
nonoliguric MEG likely 2/2 pre-renal + intrinsic 2/2 COVID-19 +/- contrast exposure +/- vancomycin toxicity; Cr worsening likely in the setting of diuresis  -decreasing lasix to 20mg qd  -avoid nephrotoxic agents  -gentle IV hydration if high GI output  -monitor I/Os   -renal following - appreciate recs    #Non-anion gap metabolic acidosis   -sodium bicarbonate TID  -f/u further nephrology recs nonoliguric MEG likely 2/2 pre-renal + intrinsic 2/2 COVID-19 +/- contrast exposure +/- vancomycin toxicity; BUN worsening likely in the setting of diuresis  -decreasing lasix to 20mg qd  -avoid nephrotoxic agents  -gentle IV hydration if high GI output  -monitor I/Os   -renal following - appreciate recs    #Non-anion gap metabolic acidosis   -sodium bicarbonate TID  -f/u further nephrology recs

## 2020-05-17 NOTE — PROGRESS NOTE ADULT - PROBLEM SELECTOR PLAN 2
decompensated cirrhosis c/b ascites likely 2/2 alcohol abuse  -SBP ruled out, hepatic encephalopathy poa now resolved  -CT abd/pelvis with persistent large ascites and peritonitis; bilateral pleural effusions and pericardial effusion  -paracentesis on 4/10 with negative cytology  -repeat paracentesis 4/19, unable to aspirate significant amount of fluid   -abd u/s 5/3 small ascites   -Rifaximin 550mg bid for hepatic encephalopathy   -will decrease Lasix to 20mg po qd in the setting of worsening renal function; will c/w Spironolactone 100mg po qd for ascites  -Check AM BMP  -holding Lactulose given high ostomy output  -pt refused video capsule endoscopy this admission   -maximum Tylenol 2g qd  -No focal hepatic lesions seen on CT A/P w/IV contrast or abdominal U/S to suggest HCC

## 2020-05-17 NOTE — PROGRESS NOTE ADULT - PROBLEM SELECTOR PLAN 5
normocytic anemia likely 2/2 chronic disease + kidney dysfunction vs less likely acute blood loss; hgb stable in the 9's  -monitor hb, transfuse <7  -s/p 7U pRBCs total this admission as of 5/5/20  -pantoprazole 40mg BID   -monitor for signs of bleeding   -GI no longer following, states no longer any need for capsule endoscopy - patient refused

## 2020-05-17 NOTE — PROGRESS NOTE ADULT - PROBLEM SELECTOR PLAN 3
now s/p 3/31/20 ex lap, subtotal colectomy+ileostomy  course c/b fever and purulent wound infection (4/9), s/p vanc/zosyn.   -s/p laparotomy, no longer with wound vac, daily dressings  -ileostomy in place draining brown stool with no signs of bleeding  -lomotil- low dose for high ostomy output  -general surgery signed off; will reconsult as needed  -wound care

## 2020-05-17 NOTE — PROGRESS NOTE ADULT - PROBLEM SELECTOR PLAN 10
F: none  E: replete prn to K>4; Mg>2   N: low fiber, consistent carbs, DASH  GI ppx: protonix BID   DVT ppx: Sub Q heparin, DVT neg by ultrasound    DISPO pending establishment of proper wound care upon discharge

## 2020-05-18 LAB
ANION GAP SERPL CALC-SCNC: 17 MMOL/L — SIGNIFICANT CHANGE UP (ref 5–17)
BUN SERPL-MCNC: 50 MG/DL — HIGH (ref 7–23)
CALCIUM SERPL-MCNC: 9.4 MG/DL — SIGNIFICANT CHANGE UP (ref 8.4–10.5)
CHLORIDE SERPL-SCNC: 94 MMOL/L — LOW (ref 96–108)
CO2 SERPL-SCNC: 18 MMOL/L — LOW (ref 22–31)
CREAT SERPL-MCNC: 1.84 MG/DL — HIGH (ref 0.5–1.3)
GLUCOSE BLDC GLUCOMTR-MCNC: 109 MG/DL — HIGH (ref 70–99)
GLUCOSE BLDC GLUCOMTR-MCNC: 152 MG/DL — HIGH (ref 70–99)
GLUCOSE BLDC GLUCOMTR-MCNC: 167 MG/DL — HIGH (ref 70–99)
GLUCOSE BLDC GLUCOMTR-MCNC: 229 MG/DL — HIGH (ref 70–99)
GLUCOSE SERPL-MCNC: 160 MG/DL — HIGH (ref 70–99)
POTASSIUM SERPL-MCNC: 5.3 MMOL/L — SIGNIFICANT CHANGE UP (ref 3.5–5.3)
POTASSIUM SERPL-SCNC: 5.3 MMOL/L — SIGNIFICANT CHANGE UP (ref 3.5–5.3)
SODIUM SERPL-SCNC: 129 MMOL/L — LOW (ref 135–145)
T4 FREE SERPL-MCNC: 0.84 NG/DL — SIGNIFICANT CHANGE UP (ref 0.7–1.48)
TSH SERPL-MCNC: 2.39 UIU/ML — SIGNIFICANT CHANGE UP (ref 0.35–4.94)

## 2020-05-18 PROCEDURE — 99232 SBSQ HOSP IP/OBS MODERATE 35: CPT | Mod: GC

## 2020-05-18 PROCEDURE — 99231 SBSQ HOSP IP/OBS SF/LOW 25: CPT | Mod: GC

## 2020-05-18 PROCEDURE — 99232 SBSQ HOSP IP/OBS MODERATE 35: CPT

## 2020-05-18 RX ORDER — METFORMIN HYDROCHLORIDE 850 MG/1
500 TABLET ORAL EVERY 12 HOURS
Refills: 0 | Status: DISCONTINUED | OUTPATIENT
Start: 2020-05-18 | End: 2020-05-19

## 2020-05-18 RX ADMIN — Medication 20 MILLIGRAM(S): at 06:12

## 2020-05-18 RX ADMIN — METFORMIN HYDROCHLORIDE 500 MILLIGRAM(S): 850 TABLET ORAL at 18:24

## 2020-05-18 RX ADMIN — Medication 650 MILLIGRAM(S): at 22:25

## 2020-05-18 RX ADMIN — Medication 4: at 17:26

## 2020-05-18 RX ADMIN — Medication 2: at 08:44

## 2020-05-18 RX ADMIN — AMLODIPINE BESYLATE 10 MILLIGRAM(S): 2.5 TABLET ORAL at 06:12

## 2020-05-18 RX ADMIN — INSULIN GLARGINE 14 UNIT(S): 100 INJECTION, SOLUTION SUBCUTANEOUS at 22:24

## 2020-05-18 RX ADMIN — Medication 1 APPLICATION(S): at 06:14

## 2020-05-18 RX ADMIN — Medication 2.5 MILLIGRAM(S): at 06:11

## 2020-05-18 RX ADMIN — BRIMONIDINE TARTRATE 1 DROP(S): 2 SOLUTION/ DROPS OPHTHALMIC at 22:26

## 2020-05-18 RX ADMIN — PANTOPRAZOLE SODIUM 40 MILLIGRAM(S): 20 TABLET, DELAYED RELEASE ORAL at 06:12

## 2020-05-18 RX ADMIN — Medication 2.5 MILLIGRAM(S): at 18:24

## 2020-05-18 RX ADMIN — Medication 100 MILLIGRAM(S): at 22:25

## 2020-05-18 RX ADMIN — DORZOLAMIDE HYDROCHLORIDE 1 DROP(S): 20 SOLUTION/ DROPS OPHTHALMIC at 14:54

## 2020-05-18 RX ADMIN — GABAPENTIN 300 MILLIGRAM(S): 400 CAPSULE ORAL at 14:55

## 2020-05-18 RX ADMIN — Medication 6 UNIT(S): at 17:26

## 2020-05-18 RX ADMIN — BRIMONIDINE TARTRATE 1 DROP(S): 2 SOLUTION/ DROPS OPHTHALMIC at 06:14

## 2020-05-18 RX ADMIN — DORZOLAMIDE HYDROCHLORIDE 1 DROP(S): 20 SOLUTION/ DROPS OPHTHALMIC at 06:15

## 2020-05-18 RX ADMIN — DORZOLAMIDE HYDROCHLORIDE 1 DROP(S): 20 SOLUTION/ DROPS OPHTHALMIC at 22:26

## 2020-05-18 RX ADMIN — HEPARIN SODIUM 7500 UNIT(S): 5000 INJECTION INTRAVENOUS; SUBCUTANEOUS at 06:13

## 2020-05-18 RX ADMIN — Medication 1 TABLET(S): at 11:15

## 2020-05-18 RX ADMIN — Medication 1 APPLICATION(S): at 18:12

## 2020-05-18 RX ADMIN — Medication 2: at 22:26

## 2020-05-18 RX ADMIN — Medication 500 MILLIGRAM(S): at 11:14

## 2020-05-18 RX ADMIN — METFORMIN HYDROCHLORIDE 500 MILLIGRAM(S): 850 TABLET ORAL at 06:12

## 2020-05-18 RX ADMIN — Medication 6 UNIT(S): at 12:09

## 2020-05-18 RX ADMIN — Medication 6 UNIT(S): at 08:44

## 2020-05-18 RX ADMIN — Medication 650 MILLIGRAM(S): at 14:55

## 2020-05-18 RX ADMIN — ATORVASTATIN CALCIUM 10 MILLIGRAM(S): 80 TABLET, FILM COATED ORAL at 22:25

## 2020-05-18 RX ADMIN — GABAPENTIN 300 MILLIGRAM(S): 400 CAPSULE ORAL at 06:13

## 2020-05-18 RX ADMIN — SPIRONOLACTONE 100 MILLIGRAM(S): 25 TABLET, FILM COATED ORAL at 06:13

## 2020-05-18 RX ADMIN — PANTOPRAZOLE SODIUM 40 MILLIGRAM(S): 20 TABLET, DELAYED RELEASE ORAL at 18:24

## 2020-05-18 RX ADMIN — HEPARIN SODIUM 7500 UNIT(S): 5000 INJECTION INTRAVENOUS; SUBCUTANEOUS at 22:25

## 2020-05-18 RX ADMIN — HEPARIN SODIUM 7500 UNIT(S): 5000 INJECTION INTRAVENOUS; SUBCUTANEOUS at 14:54

## 2020-05-18 RX ADMIN — BRIMONIDINE TARTRATE 1 DROP(S): 2 SOLUTION/ DROPS OPHTHALMIC at 14:53

## 2020-05-18 RX ADMIN — GABAPENTIN 300 MILLIGRAM(S): 400 CAPSULE ORAL at 22:25

## 2020-05-18 RX ADMIN — Medication 650 MILLIGRAM(S): at 06:12

## 2020-05-18 NOTE — PROGRESS NOTE ADULT - ATTENDING COMMENTS
Pt. seen and examined by me earlier today; I have read Dr. Russell's note, I agree w/ her findings and plan of care as documented; Pt. is medically-clear for d/c, see CM/SW notes

## 2020-05-18 NOTE — PROGRESS NOTE ADULT - PROBLEM SELECTOR PLAN 1
non oliguric MEG, perfusional/ATN pic   renal fx remained stable, hyponatremia most likely from hypervolemia i/s/o cirrhosis, back on lasix/spironolactone  nephrologically stable  - would titrate up lasix to 40 mg for [id number] and better K control when on spironolactone   - c/w Nabicarb 650 mg TID for now  Will follow

## 2020-05-18 NOTE — PROGRESS NOTE ADULT - PROBLEM SELECTOR PLAN 10
F: none  E: replete prn to K>4; Mg>2   N: low fiber, consistent carbs  GI ppx: protonix BID   DVT ppx: Sub Q heparin, DVT neg by ultrasound    DISPO pending establishment of proper woundcare upon discharge F: none  E: replete prn to K>4; Mg>2   N: low fiber, consistent carbs  GI ppx: protonix BID   DVT ppx: Sub Q heparin    DISPO pending establishment of proper woundcare upon discharge

## 2020-05-18 NOTE — PROGRESS NOTE ADULT - PROBLEM SELECTOR PLAN 5
normocytic anemia likely 2/2 chronic disease + kidney dysfunction vs less likely acute blood loss  -Hgb downtrended to 6.6 on 5/5, s/p unit PRBC on 5/5, H/H now stable at 8.3/25.7  -monitor hb, transfuse <7  -s/p 7U pRBCs total this admission as of 5/5/20  -pantoprazole 40mg BID   -monitor for signs of bleeding   -GI no longer following, states no longer any need for capsule endoscopy - patient also refused

## 2020-05-18 NOTE — PROGRESS NOTE ADULT - ATTENDING COMMENTS
Events of the weekend reviewed.  Insulin regimen has not been changed, but glucoses still moderately above target.  Would increase the metformin to 1000 mg BID

## 2020-05-18 NOTE — PROGRESS NOTE ADULT - PROBLEM SELECTOR PLAN 4
nonoliguric MEG likely 2/2 pre-renal + intrinsic 2/2 COVID-19 +/- contrast exposure +/- vancomycin toxicity  -Cr with continued downtrend  -avoid nephrotoxic agents  -gentle IV hydration if high GI output  -monitor I/Os   -renal following - appreciate recs    #Non-anion gap metabolic acidosis   -sodium bicarbonate TID  -f/u further nephrology recs nonoliguric MEG likely 2/2 pre-renal + intrinsic 2/2 COVID-19 + recent initiation/increase of Lasix  -trend BMP  -avoid nephrotoxic agents; discontinue vs decrease Lasix dosage  -gentle IV hydration if high GI output  -monitor I/Os   -renal following - appreciate recs    #Non-anion gap metabolic acidosis   -sodium bicarbonate TID  -f/u further nephrology recs

## 2020-05-18 NOTE — PROGRESS NOTE ADULT - PROBLEM SELECTOR PLAN 9
-Increased trazodone to back to full home dose at 100 mg PO daily home regimen: trazodone 100mg qhs  -continue home regimen

## 2020-05-18 NOTE — PROGRESS NOTE ADULT - NSHPATTENDINGPLANDISCUSS_GEN_ALL_CORE
PCP: Ambreen Boggs MD    Last appt: 1/9/2020  Future Appointments   Date Time Provider Vincenzo Licea   4/30/2020  3:40 PM Vandana Norman MD [address] [facility]   7/9/2020 11:00 AM Loren Gaming, GENARO [facility]       Requested Prescriptions     Pending Prescriptions Disp Refills    gabapentin (NEURONTIN) 300 mg capsule 270 Cap 11       Pharmacy verified: y    Prior labs and Blood pressures:  BP Readings from Last 3 Encounters:   01/22/20 120/70   12/18/19 110/70   10/10/19 120/68     Lab Results   Component Value Date/Time    Sodium 144 10/01/2015 12:18 PM    Potassium 5.0 10/01/2015 12:18 PM    Chloride 103 10/01/2015 12:18 PM    CO2 25 10/01/2015 12:18 PM    Glucose 79 10/01/2015 12:18 PM    BUN 38 (H) 10/01/2015 12:18 PM    Creatinine 3.94 (H) 10/01/2015 12:18 PM    BUN/Creatinine ratio 10 10/01/2015 12:18 PM    GFR est AA 14 (L) 10/01/2015 12:18 PM    GFR est non-AA 12 (L) 10/01/2015 12:18 PM    Calcium 8.8 10/01/2015 12:18 PM     No results found for: HBA1C, HGBE8, GYN1XARM, HCI4QDFI  Lab Results   Component Value Date/Time    Cholesterol, total 195 10/01/2015 12:18 PM    HDL Cholesterol 69 10/01/2015 12:18 PM    LDL, calculated 111 (H) 10/01/2015 12:18 PM    VLDL, calculated 15 10/01/2015 12:18 PM    Triglyceride 73 10/01/2015 12:18 PM     Lab Results   Component Value Date/Time    VITAMIN D, 25-HYDROXY 18.7 (L) 10/01/2015 12:18 PM       Lab Results   Component Value Date/Time    TSH 2.390 10/01/2015 12:18 PM Dr. Ayon and [address] [place], [place] [id number]

## 2020-05-18 NOTE — PROGRESS NOTE ADULT - PROBLEM SELECTOR PLAN 7
Type 2 DM, endocrinology following   -lantus 14U qhs  -lispro 6U TID pre-meal   -mISS  -metformin 500mg bid  -continue to appreciate endo recs    #hypothyroidism   -TSH 2.813; TPO and antithyroid Abs neg   -levothyroxine 50mcg discontinued on 4/29 to assess necessity as there is no evidence of autoimmune hypothyroidism at this time Type 2 DM, endocrinology following   -lantus 14U qhs  -lispro 6U TID pre-meal   -mISS  -metformin 500mg bid  -continue to appreciate endo recs    #hypothyroidism   -TSH 2.813; TPO and antithyroid Abs neg   -levothyroxine 50mcg discontinued per endocrinology 4/29 given low concern for autoimmune hypothyroidism at this time; will assess necessity of continuation of levothyroxine  -f/u endocrine recs

## 2020-05-18 NOTE — PROGRESS NOTE ADULT - ATTENDING COMMENTS
case reviewed and discussed  prolonged hospitalization-  persistent hyponatremia, azotemia, metabolic acidosis  and rising K  as above to resume diuretics for cirrhosis  K needs to be monitored closely while on aldactone.  can also try increasing bicarbonate to 2 tabs TID as the higher CO2 level will also help shift K back into cells.  may need potassium lowering agent

## 2020-05-18 NOTE — PROGRESS NOTE ADULT - SUBJECTIVE AND OBJECTIVE BOX
O/N Events:  LUCA  Subjective:  labs and vitals reviewed, renal fx remained stable, hypernatremia almost unchanged     VITALS  Vital Signs Last 24 Hrs  T(C): 36.4 (18 May 2020 06:16), Max: 36.6 (17 May 2020 21:15)  T(F): 97.6 (18 May 2020 06:16), Max: 97.8 (17 May 2020 21:15)  HR: 62 (18 May 2020 06:16) (62 - 77)  BP: 137/63 (18 May 2020 06:16) (137/63 - 147/72)  BP(mean): 140/ 68 mmhg  RR: 19 (18 May 2020 06:16) (18 - 19)  SpO2: 100% (18 May 2020 06:16) (99% - 100%)    PHYSICAL EXAM  deferred per covid- isolation protocol     MEDICATIONS  (STANDING):  amLODIPine   Tablet 10 milliGRAM(s) Oral daily  AQUAPHOR (petrolatum Ointment) 1 Application(s) Topical two times a day  atorvastatin 10 milliGRAM(s) Oral at bedtime  baclofen 2.5 milliGRAM(s) Oral every 12 hours  brimonidine 0.2% Ophthalmic Solution 1 Drop(s) Both EYES three times a day  chlorhexidine 2% Cloths 1 Application(s) Topical daily  dextrose 5%. 1000 milliLiter(s) (50 mL/Hr) IV Continuous <Continuous>  dextrose 50% Injectable 12.5 Gram(s) IV Push once  dextrose 50% Injectable 25 Gram(s) IV Push once  dextrose 50% Injectable 25 Gram(s) IV Push once  diphenoxylate/atropine 1 Tablet(s) Oral daily  dorzolamide 2% Ophthalmic Solution 1 Drop(s) Both EYES three times a day  furosemide    Tablet 20 milliGRAM(s) Oral daily  gabapentin 300 milliGRAM(s) Oral every 8 hours  heparin   Injectable 7500 Unit(s) SubCutaneous every 8 hours  insulin glargine Injectable (LANTUS) 14 Unit(s) SubCutaneous at bedtime  insulin lispro (HumaLOG) corrective regimen sliding scale   SubCutaneous Before meals and at bedtime  insulin lispro Injectable (HumaLOG) 6 Unit(s) SubCutaneous three times a day before meals  metFORMIN 500 milliGRAM(s) Oral two times a day  pantoprazole    Tablet 40 milliGRAM(s) Oral every 12 hours  rifAXIMin 550 milliGRAM(s) Oral two times a day  sodium bicarbonate 650 milliGRAM(s) Oral three times a day  spironolactone 100 milliGRAM(s) Oral daily  traZODone 100 milliGRAM(s) Oral at bedtime    MEDICATIONS  (PRN):  acetaminophen   Tablet .. 500 milliGRAM(s) Oral every 6 hours PRN Mild Pain (1 - 3), Moderate Pain (4 - 6)  dextrose 40% Gel 15 Gram(s) Oral once PRN Blood Glucose LESS THAN 70 milliGRAM(s)/deciliter  glucagon  Injectable 1 milliGRAM(s) IntraMuscular once PRN Glucose LESS THAN 70 milligrams/deciliter  ondansetron    Tablet 4 milliGRAM(s) Oral every 8 hours PRN Nausea and/or Vomiting      LABS                        9.0    7.71  )-----------( 253      ( 17 May 2020 07:19 )             26.3     05-18    129<L>  |  94<L>  |  50<H>  ----------------------------<  160<H>  5.3   |  18<L>  |  1.84<H>    Ca    9.4      18 May 2020 07:38  Phos  4.8     05-17  Mg     1.7     05-17    TPro  8.5<H>  /  Alb  3.7  /  TBili  0.2  /  DBili  x   /  AST  14  /  ALT  17  /  AlkPhos  104  05-17    LIVER FUNCTIONS - ( 17 May 2020 07:19 )  Alb: 3.7 g/dL / Pro: 8.5 g/dL / ALK PHOS: 104 U/L / ALT: 17 U/L / AST: 14 U/L / GGT: x

## 2020-05-18 NOTE — ADVANCED PRACTICE NURSE CONSULT - ASSESSMENT
Ostomy appliance intact, instructed pt on using belt to secure appliance. Also reminded him to empty appliance before it becomes overfull and leaks. Triad cream applied to surgical wound beds and covered with 4x4s and ABD pads. Wound beds pink and granulating, superior wound still has 2 open areas in wound bed, minimal drainage to old dressing.

## 2020-05-18 NOTE — PROGRESS NOTE ADULT - PROBLEM SELECTOR PLAN 3
now s/p 3/31/20 ex lap, subtotal colectomy+ileostomy  course c/b fever and purulent wound infection (4/9), s/p vanc/zosyn.   -s/p laparotomy, no longer with wound vac, daily dressings  -ileostomy in place draining brown stool with no signs of bleeding  -lomotil- low dose for high ostomy output  -general surgery following, appreciate recs  -woundcare now s/p 3/31/20 ex lap with subtotal colectomy+ileostomy  course c/b fever and purulent wound infection (4/9) s/p vanc/zosyn  -s/p laparotomy, daily dressings for midline incision  -ileostomy in place draining brown stool with no signs of bleeding  -lomotil low dose for high ostomy output  -general surgery following, appreciate recs  -woundcare

## 2020-05-18 NOTE — PROGRESS NOTE ADULT - PROBLEM SELECTOR PLAN 2
decompensated cirrhosis c/b ascites likely 2/2 alcohol abuse  -SBP ruled out, hepatic encephalopathy poa now resolved  -CT abd/pelvis with persistent large ascites and peritonitis; bilateral pleural effusions and pericardial effusion  -paracentesis on 4/10 with negative cytology  -repeat paracentesis 4/19, unable to aspirate significant amount of fluid   -abd u/s 5/3 small ascites   -Rifaximin 550mg bid for hepatic encephalopathy   -Increase to Lasix 40mg po qd and Spironolactone 100mg po qd for ascites  -Check AM BMP  -holding Lactulose given high ostomy output  -pt refused video capsule endoscopy this admission   -maximum Tylenol 2g qd  -No focal hepatic lesions seen on CT A/P w/IV contrast or abdominal U/S to suggest HCC decompensated cirrhosis c/b ascites likely 2/2 alcohol abuse  -SBP ruled out, hepatic encephalopathy poa now resolved  -CT abd/pelvis with persistent large ascites and peritonitis; bilateral pleural effusions and pericardial effusion  -paracentesis on 4/10 with negative cytology  -repeat paracentesis 4/19, unable to aspirate significant amount of fluid   -abd u/s 5/3 small ascites   -Rifaximin 550mg bid for hepatic encephalopathy (poa)  -Increase to Lasix 40mg po qd and Spironolactone 100mg po qd for ascites  -holding Lactulose in setting of high ostomy output  -maximum Tylenol 2g qd  -no focal hepatic lesions seen on CT A/P w/IV contrast or abdominal U/S to suggest HCC

## 2020-05-18 NOTE — PROGRESS NOTE ADULT - ASSESSMENT
60M PMH HTN, DM + neuropathy, IVDU, ETOH liver cirrhosis, presents to [facility] s/p fall at home; found with toxic megacolon and pneumatosis; now s/p ex lap + subtotal colectomy, ileostomy (3/31). Hospital course further complicated by COVID, MEG, anemia (refused further GI workeup). Pt stable for discharge pending instatement of proper woundcare services. 60M PMH HTN, DM + neuropathy, IVDU, ETOH liver cirrhosis, presents to [facility] s/p fall at home; found with toxic megacolon and pneumatosis; now s/p ex lap + subtotal colectomy, ileostomy (3/31). Hospital course further complicated by COVID, MEG, anemia with suspicion for GI source/varices (refused further GI workeup). Pt stable for discharge pending establishment of proper wound-care services.

## 2020-05-18 NOTE — PROGRESS NOTE ADULT - SUBJECTIVE AND OBJECTIVE BOX
INTERVAL HPI/OVERNIGHT EVENTS:    Patient is a 60y old  Male who presents with a chief complaint of toxic megacolon, COVID + (06 May 2020 10:01)      Pt reports the following symptoms:    CONSTITUTIONAL:  Negative fever or chills, feels well, good appetite  EYES:  Negative  blurry vision or double vision  CARDIOVASCULAR:  Negative for chest pain or palpitations  RESPIRATORY:  Negative for cough, wheezing, or SOB   GASTROINTESTINAL:  Negative for nausea, vomiting, diarrhea, constipation, or abdominal pain  GENITOURINARY:  Negative frequency, urgency or dysuria  NEUROLOGIC:  No headache, confusion, dizziness, lightheadedness    MEDICATIONS  (STANDING):  amLODIPine   Tablet 10 milliGRAM(s) Oral daily  AQUAPHOR (petrolatum Ointment) 1 Application(s) Topical two times a day  atorvastatin 10 milliGRAM(s) Oral at bedtime  baclofen 2.5 milliGRAM(s) Oral every 12 hours  brimonidine 0.2% Ophthalmic Solution 1 Drop(s) Both EYES three times a day  chlorhexidine 2% Cloths 1 Application(s) Topical daily  dextrose 5%. 1000 milliLiter(s) (50 mL/Hr) IV Continuous <Continuous>  dextrose 50% Injectable 12.5 Gram(s) IV Push once  dextrose 50% Injectable 25 Gram(s) IV Push once  dextrose 50% Injectable 25 Gram(s) IV Push once  diphenoxylate/atropine 1 Tablet(s) Oral daily  dorzolamide 2% Ophthalmic Solution 1 Drop(s) Both EYES three times a day  furosemide    Tablet 20 milliGRAM(s) Oral daily  gabapentin 300 milliGRAM(s) Oral every 8 hours  heparin   Injectable 7500 Unit(s) SubCutaneous every 8 hours  insulin glargine Injectable (LANTUS) 14 Unit(s) SubCutaneous at bedtime  insulin lispro (HumaLOG) corrective regimen sliding scale   SubCutaneous Before meals and at bedtime  insulin lispro Injectable (HumaLOG) 6 Unit(s) SubCutaneous three times a day before meals  metFORMIN 500 milliGRAM(s) Oral two times a day  pantoprazole    Tablet 40 milliGRAM(s) Oral every 12 hours  rifAXIMin 550 milliGRAM(s) Oral two times a day  sodium bicarbonate 650 milliGRAM(s) Oral three times a day  spironolactone 100 milliGRAM(s) Oral daily  traZODone 100 milliGRAM(s) Oral at bedtime    MEDICATIONS  (PRN):  acetaminophen   Tablet .. 500 milliGRAM(s) Oral every 6 hours PRN Mild Pain (1 - 3), Moderate Pain (4 - 6)  dextrose 40% Gel 15 Gram(s) Oral once PRN Blood Glucose LESS THAN 70 milliGRAM(s)/deciliter  glucagon  Injectable 1 milliGRAM(s) IntraMuscular once PRN Glucose LESS THAN 70 milligrams/deciliter  ondansetron    Tablet 4 milliGRAM(s) Oral every 8 hours PRN Nausea and/or Vomiting      PHYSICAL EXAM  Vital Signs Last 24 Hrs  T(C): 36.4 (18 May 2020 12:21), Max: 36.6 (17 May 2020 21:15)  T(F): 97.6 (18 May 2020 12:21), Max: 97.8 (17 May 2020 21:15)  HR: 76 (18 May 2020 12:21) (62 - 77)  BP: 123/67 (18 May 2020 12:21) (123/67 - 147/72)  BP(mean): 86 (18 May 2020 12:21) (86 - 86)  RR: 18 (18 May 2020 12:21) (18 - 19)  SpO2: 100% (18 May 2020 12:21) (99% - 100%)    Constitutional: wn/wd in NAD.   HEENT: NCAT, MMM, OP clear, EOMI, no proptosis or lid retraction  Neck: no thyromegaly or palpable thyroid nodules   Respiratory: lungs CTAB.  Cardiovascular: regular rhythm, normal S1 and S2, no audible murmurs, no peripheral edema  GI: soft, NT/ND, no masses/HSM appreciated.  Neurology: no tremors, DTR 2+  Skin: no visible rashes/lesions  Psychiatric: AAO x 3, normal affect/mood.    LABS:                        9.0    7.71  )-----------( 253      ( 17 May 2020 07:19 )             26.3     05-18    129<L>  |  94<L>  |  50<H>  ----------------------------<  160<H>  5.3   |  18<L>  |  1.84<H>    Ca    9.4      18 May 2020 07:38  Phos  4.8     05-17  Mg     1.7     05-17    TPro  8.5<H>  /  Alb  3.7  /  TBili  0.2  /  DBili  x   /  AST  14  /  ALT  17  /  AlkPhos  104  05-17        Thyroid Stimulating Hormone, Serum: 2.389 uIU/mL (05-18 @ 07:38)  Thyroid Stimulating Hormone, Serum: 3.869 uIU/mL (05-13 @ 06:50)  Thyroid Stimulating Hormone, Serum: 3.104 uIU/mL (05-11 @ 06:10)  Thyroid Stimulating Hormone, Serum: 2.712 uIU/mL (05-07 @ 06:31)  Thyroid Stimulating Hormone, Serum: 3.063 uIU/mL (05-07 @ 06:31)  Thyroid Stimulating Hormone, Serum: 3.509 uIU/mL (05-03 @ 06:07)  Thyroid Stimulating Hormone, Serum: 3.325 uIU/mL (04-29 @ 06:17)  Thyroid Stimulating Hormone, Serum: 2.813 uIU/mL (04-14 @ 07:28)  Thyroid Stimulating Hormone, Serum: 2.877 uIU/mL (04-14 @ 00:34)      HbA1C: 6.9 % (04-05 @ 06:59)  7.6 % (03-31 @ 05:58)    CAPILLARY BLOOD GLUCOSE      POCT Blood Glucose.: 109 mg/dL (18 May 2020 11:57)  POCT Blood Glucose.: 167 mg/dL (18 May 2020 08:01)  POCT Blood Glucose.: 210 mg/dL (17 May 2020 21:44)  POCT Blood Glucose.: 190 mg/dL (17 May 2020 17:07)      Insulin Sliding Scale requirements X 24 Hours:    RADIOLOGY & ADDITIONAL TESTS:    A/P: 60y Male with history of DM type II presenting for       1.  DM -     Please continue           units lantus at bedtime  / in the morning and        units lispro with meals and lispro moderate / low dose sliding scale 4 times daily with meals and at bedtime.  Please continue consistent carbohydrate diet.      Goal FSG is   Will continue to monitor   For discharge, pt can continue    Pt can follow up at discharge with [facility] by calling  to make an appointment.   Will discuss case with     and update primary team INTERVAL HPI/OVERNIGHT EVENTS:    Patient is a 60y old  Male who presents with a chief complaint of toxic megacolon, COVID + (06 May 2020 10:01)  Spoke to the primary team taking care of the patient and patient via phone  No acute events.   Able to tolerate metformin 500 BID- No GI disturbancces  Saturating well on RA. appetite is good. Can't remember what he ate-is having memory issues  dispo pending TSh was 2.389 and Free T4 0.84    FSG & Insulin received:  Yesterday:  pre-dinner fs , 6 units lispro + 2  bedtime fs, 14  lantus   units + 4 units lispro SS  Today:  pre-breakfast fs, 6 nutritional lispro   units+  2  units lispro SS. eggs, bread, pudding  pre-lunch fs    Pt reports the following symptoms:  CONSTITUTIONAL:  Negative fever or chills, feels well, good appetite  CARDIOVASCULAR:  Negative for chest pain or palpitations  RESPIRATORY:  Negative for cough, wheezing, or SOB   GASTROINTESTINAL:  Negative for nausea, vomiting, diarrhea, constipation, or abdominal pain  NEUROLOGIC:  No headache, confusion, dizziness, lightheadedness    MEDICATIONS  (STANDING):  amLODIPine   Tablet 10 milliGRAM(s) Oral daily  AQUAPHOR (petrolatum Ointment) 1 Application(s) Topical two times a day  atorvastatin 10 milliGRAM(s) Oral at bedtime  baclofen 2.5 milliGRAM(s) Oral every 12 hours  brimonidine 0.2% Ophthalmic Solution 1 Drop(s) Both EYES three times a day  chlorhexidine 2% Cloths 1 Application(s) Topical daily  dextrose 5%. 1000 milliLiter(s) (50 mL/Hr) IV Continuous <Continuous>  dextrose 50% Injectable 12.5 Gram(s) IV Push once  dextrose 50% Injectable 25 Gram(s) IV Push once  dextrose 50% Injectable 25 Gram(s) IV Push once  diphenoxylate/atropine 1 Tablet(s) Oral daily  dorzolamide 2% Ophthalmic Solution 1 Drop(s) Both EYES three times a day  furosemide    Tablet 20 milliGRAM(s) Oral daily  gabapentin 300 milliGRAM(s) Oral every 8 hours  heparin   Injectable 7500 Unit(s) SubCutaneous every 8 hours  insulin glargine Injectable (LANTUS) 14 Unit(s) SubCutaneous at bedtime  insulin lispro (HumaLOG) corrective regimen sliding scale   SubCutaneous Before meals and at bedtime  insulin lispro Injectable (HumaLOG) 6 Unit(s) SubCutaneous three times a day before meals  metFORMIN 500 milliGRAM(s) Oral two times a day  pantoprazole    Tablet 40 milliGRAM(s) Oral every 12 hours  rifAXIMin 550 milliGRAM(s) Oral two times a day  sodium bicarbonate 650 milliGRAM(s) Oral three times a day  spironolactone 100 milliGRAM(s) Oral daily  traZODone 100 milliGRAM(s) Oral at bedtime    MEDICATIONS  (PRN):  acetaminophen   Tablet .. 500 milliGRAM(s) Oral every 6 hours PRN Mild Pain (1 - 3), Moderate Pain (4 - 6)  dextrose 40% Gel 15 Gram(s) Oral once PRN Blood Glucose LESS THAN 70 milliGRAM(s)/deciliter  glucagon  Injectable 1 milliGRAM(s) IntraMuscular once PRN Glucose LESS THAN 70 milligrams/deciliter  ondansetron    Tablet 4 milliGRAM(s) Oral every 8 hours PRN Nausea and/or Vomiting      PHYSICAL EXAM  Vital Signs Last 24 Hrs  T(C): 36.4 (18 May 2020 12:21), Max: 36.6 (17 May 2020 21:15)  T(F): 97.6 (18 May 2020 12:21), Max: 97.8 (17 May 2020 21:15)  HR: 76 (18 May 2020 12:21) (62 - 77)  BP: 123/67 (18 May 2020 12:21) (123/67 - 147/72)  BP(mean): 86 (18 May 2020 12:21) (86 - 86)  RR: 18 (18 May 2020 12:21) (18 - 19)  SpO2: 100% (18 May 2020 12:21) (99% - 100%)    Due to the nature of this patient’s COVID-19 isolation status (either confirmed or suspected), this note was prepared without a bedside physical examination to prevent spread of infection and to conserve personal protective equipment during this nationwide pandemic. If possible, direct patient communication occurred via electronic measures or telephone conversation. Examination highlights were provided by a bedside nurse wearing personal protective equipment and review of pertinent medical records. Objective data (vital signs, urine output, lab results, imaging studies, medications, etc) were reviewed in detail. Face to face visits and physical examination have been limited only to patients for whom it is required for medical decision making.    LABS:                        9.0    7.71  )-----------( 253      ( 17 May 2020 07:19 )             26.3         129<L>  |  94<L>  |  50<H>  ----------------------------<  160<H>  5.3   |  18<L>  |  1.84<H>    Ca    9.4      18 May 2020 07:38  Phos  4.8       Mg     1.7         TPro  8.5<H>  /  Alb  3.7  /  TBili  0.2  /  DBili  x   /  AST  14  /  ALT  17  /  AlkPhos  104          Thyroid Stimulating Hormone, Serum: 2.389 uIU/mL ( @ 07:38)  Thyroid Stimulating Hormone, Serum: 3.869 uIU/mL ( @ 06:50)  Thyroid Stimulating Hormone, Serum: 3.104 uIU/mL ( @ 06:10)  Thyroid Stimulating Hormone, Serum: 2.712 uIU/mL ( @ 06:31)  Thyroid Stimulating Hormone, Serum: 3.063 uIU/mL ( @ 06:31)  Thyroid Stimulating Hormone, Serum: 3.509 uIU/mL ( @ 06:07)  Thyroid Stimulating Hormone, Serum: 3.325 uIU/mL ( @ 06:17)  Thyroid Stimulating Hormone, Serum: 2.813 uIU/mL ( @ 07:28)  Thyroid Stimulating Hormone, Serum: 2.877 uIU/mL ( @ 00:34)      HbA1C: 6.9 % ( @ 06:59)  7.6 % ( @ 05:58)    CAPILLARY BLOOD GLUCOSE      POCT Blood Glucose.: 109 mg/dL (18 May 2020 11:57)  POCT Blood Glucose.: 167 mg/dL (18 May 2020 08:01)  POCT Blood Glucose.: 210 mg/dL (17 May 2020 21:44)  POCT Blood Glucose.: 190 mg/dL (17 May 2020 17:07)      Insulin Sliding Scale requirements X 24 Hours:    RADIOLOGY & ADDITIONAL TESTS:    A/P 60yMale with hx of DM now with COVID, worsening renal function, moderate hyperglycemia.     1.  DM: type 2  Please continue Lantus 14 units at bedtime  PLease continue Lispro 6  units tid with meals.   Continue lispro moderate dose scale with meals and at bedtime.   Please increase to metformin 1000 mg BID - with breakfast and dinner   Continue consistent carb diet  FSG Goal 100-180  GFR 40 and EF 65%    2.  Hyperlipidemia - goal LDL < 70  - on lipitor 10 mg once daily    3.  Obesity - outpatient medical management.      4.  Hypothyroidism   - TSH on admission 2.8.   - levothyroxine 50mcg discontinued on  to assess necessity as there is no evidence of autoimmune hypothyroidism at this time.   - NO levothyroxine at this time.   - Repeat TSH 3.32 and free T4 1.03 ().   - TSH 3.5 and Free T4 1.01 on 5/3   - TSh was 2.712 and free T4 was 0.98 on   TSh was 3.104 and free T4 was 0.86 on   TSh was 2.389 and Free T4 0.84 on     For discharge, metformin 1000mg BID and glimepiride 1mg before breakfast.    Case seen and discussed with  and updated the primary team.  Pt can follow up at discharge with [facility] by calling  to make an appointment.

## 2020-05-18 NOTE — PROGRESS NOTE ADULT - PROBLEM SELECTOR PLAN 8
Home regimen: amlodipine 10mg qd, HCTZ, Lisinopril  -continue amlodipine 10mg QD  -hold HCTZ Lisinopril in setting of MEG  -monitor BP    #HLD  Home regimen: pravastatin 40mg qhs  -continue statin as therapeutic interchange for atorvastatin home regimen: amlodipine 10mg qd, HCTZ, Lisinopril  -continue amlodipine 10mg QD  -hold HCTZ Lisinopril in setting of MEG  -monitor BP    #HLD  home regimen: pravastatin 40mg qhs  -continue statin as therapeutic interchange for atorvastatin

## 2020-05-18 NOTE — PROGRESS NOTE ADULT - SUBJECTIVE AND OBJECTIVE BOX
OVERNIGHT EVENTS: No acute events overnight.  SUBJECTIVE: Patient seen and examined at the bedside. Patient denies new complaints at this time. Denies pain, sob; 12-point ROS reviewed and is otherwise negative.    Vital Signs Last 12 Hrs  T(F): 97.6 (05-18-20 @ 06:16), Max: 97.8 (05-17-20 @ 21:15)  HR: 62 (05-18-20 @ 06:16) (62 - 69)  BP: 137/63 (05-18-20 @ 06:16) (137/63 - 142/69)  BP(mean): --  RR: 19 (05-18-20 @ 06:16) (18 - 19)  SpO2: 100% (05-18-20 @ 06:16) (99% - 100%)    PHYSICAL EXAM: patient resistant to full examination  General: NAD, resting comfortably in bed  HEENT: NCAT, MMM  Respiratory: normal respiratory rate/depth/effort  Neuro: AAOx3, no focal neurological deficits    LABS:                  9.0    7.71  )-----------( 253      ( 17 May 2020 07:19 )             26.3     05-17    128<L>  |  94<L>  |  47<H>  ----------------------------<  142<H>  4.6   |  19<L>  |  1.69<H>    Ca    9.7      17 May 2020 07:19  Phos  4.8     05-17  Mg     1.7     05-17    TPro  8.5<H>  /  Alb  3.7  /  TBili  0.2  /  DBili  x   /  AST  14  /  ALT  17  /  AlkPhos  104  05-17    RADIOLOGY & ADDITIONAL TESTS: Reviewed    MEDICATIONS  (STANDING):  amLODIPine   Tablet 10 milliGRAM(s) Oral daily  AQUAPHOR (petrolatum Ointment) 1 Application(s) Topical two times a day  atorvastatin 10 milliGRAM(s) Oral at bedtime  baclofen 2.5 milliGRAM(s) Oral every 12 hours  brimonidine 0.2% Ophthalmic Solution 1 Drop(s) Both EYES three times a day  chlorhexidine 2% Cloths 1 Application(s) Topical daily  dextrose 5%. 1000 milliLiter(s) (50 mL/Hr) IV Continuous <Continuous>  dextrose 50% Injectable 12.5 Gram(s) IV Push once  dextrose 50% Injectable 25 Gram(s) IV Push once  dextrose 50% Injectable 25 Gram(s) IV Push once  diphenoxylate/atropine 1 Tablet(s) Oral daily  dorzolamide 2% Ophthalmic Solution 1 Drop(s) Both EYES three times a day  furosemide    Tablet 20 milliGRAM(s) Oral daily  gabapentin 300 milliGRAM(s) Oral every 8 hours  heparin   Injectable 7500 Unit(s) SubCutaneous every 8 hours  insulin glargine Injectable (LANTUS) 14 Unit(s) SubCutaneous at bedtime  insulin lispro (HumaLOG) corrective regimen sliding scale   SubCutaneous Before meals and at bedtime  insulin lispro Injectable (HumaLOG) 6 Unit(s) SubCutaneous three times a day before meals  metFORMIN 500 milliGRAM(s) Oral two times a day  pantoprazole    Tablet 40 milliGRAM(s) Oral every 12 hours  rifAXIMin 550 milliGRAM(s) Oral two times a day  sodium bicarbonate 650 milliGRAM(s) Oral three times a day  spironolactone 100 milliGRAM(s) Oral daily  traZODone 100 milliGRAM(s) Oral at bedtime    MEDICATIONS  (PRN):  acetaminophen   Tablet .. 500 milliGRAM(s) Oral every 6 hours PRN Mild Pain (1 - 3), Moderate Pain (4 - 6)  dextrose 40% Gel 15 Gram(s) Oral once PRN Blood Glucose LESS THAN 70 milliGRAM(s)/deciliter  glucagon  Injectable 1 milliGRAM(s) IntraMuscular once PRN Glucose LESS THAN 70 milligrams/deciliter  ondansetron    Tablet 4 milliGRAM(s) Oral every 8 hours PRN Nausea and/or Vomiting

## 2020-05-19 LAB
ANION GAP SERPL CALC-SCNC: 15 MMOL/L — SIGNIFICANT CHANGE UP (ref 5–17)
BUN SERPL-MCNC: 56 MG/DL — HIGH (ref 7–23)
CALCIUM SERPL-MCNC: 10.1 MG/DL — SIGNIFICANT CHANGE UP (ref 8.4–10.5)
CHLORIDE SERPL-SCNC: 94 MMOL/L — LOW (ref 96–108)
CO2 SERPL-SCNC: 18 MMOL/L — LOW (ref 22–31)
CREAT SERPL-MCNC: 1.77 MG/DL — HIGH (ref 0.5–1.3)
GLUCOSE BLDC GLUCOMTR-MCNC: 146 MG/DL — HIGH (ref 70–99)
GLUCOSE BLDC GLUCOMTR-MCNC: 161 MG/DL — HIGH (ref 70–99)
GLUCOSE BLDC GLUCOMTR-MCNC: 162 MG/DL — HIGH (ref 70–99)
GLUCOSE BLDC GLUCOMTR-MCNC: 213 MG/DL — HIGH (ref 70–99)
GLUCOSE SERPL-MCNC: 205 MG/DL — HIGH (ref 70–99)
POTASSIUM SERPL-MCNC: 5.1 MMOL/L — SIGNIFICANT CHANGE UP (ref 3.5–5.3)
POTASSIUM SERPL-SCNC: 5.1 MMOL/L — SIGNIFICANT CHANGE UP (ref 3.5–5.3)
SODIUM SERPL-SCNC: 127 MMOL/L — LOW (ref 135–145)

## 2020-05-19 PROCEDURE — 99232 SBSQ HOSP IP/OBS MODERATE 35: CPT

## 2020-05-19 PROCEDURE — 99231 SBSQ HOSP IP/OBS SF/LOW 25: CPT | Mod: GC

## 2020-05-19 RX ORDER — INSULIN LISPRO 100/ML
8 VIAL (ML) SUBCUTANEOUS
Refills: 0 | Status: DISCONTINUED | OUTPATIENT
Start: 2020-05-19 | End: 2020-06-01

## 2020-05-19 RX ORDER — FUROSEMIDE 40 MG
20 TABLET ORAL ONCE
Refills: 0 | Status: COMPLETED | OUTPATIENT
Start: 2020-05-19 | End: 2020-05-19

## 2020-05-19 RX ORDER — DIPHENOXYLATE HCL/ATROPINE 2.5-.025MG
1 TABLET ORAL DAILY
Refills: 0 | Status: DISCONTINUED | OUTPATIENT
Start: 2020-05-19 | End: 2020-05-26

## 2020-05-19 RX ORDER — FUROSEMIDE 40 MG
40 TABLET ORAL EVERY 24 HOURS
Refills: 0 | Status: DISCONTINUED | OUTPATIENT
Start: 2020-05-20 | End: 2020-05-22

## 2020-05-19 RX ORDER — METFORMIN HYDROCHLORIDE 850 MG/1
500 TABLET ORAL EVERY 12 HOURS
Refills: 0 | Status: DISCONTINUED | OUTPATIENT
Start: 2020-05-19 | End: 2020-05-20

## 2020-05-19 RX ADMIN — Medication 6 UNIT(S): at 12:13

## 2020-05-19 RX ADMIN — PANTOPRAZOLE SODIUM 40 MILLIGRAM(S): 20 TABLET, DELAYED RELEASE ORAL at 18:01

## 2020-05-19 RX ADMIN — Medication 1 TABLET(S): at 11:52

## 2020-05-19 RX ADMIN — Medication 6 UNIT(S): at 17:20

## 2020-05-19 RX ADMIN — Medication 650 MILLIGRAM(S): at 22:04

## 2020-05-19 RX ADMIN — GABAPENTIN 300 MILLIGRAM(S): 400 CAPSULE ORAL at 22:04

## 2020-05-19 RX ADMIN — GABAPENTIN 300 MILLIGRAM(S): 400 CAPSULE ORAL at 06:39

## 2020-05-19 RX ADMIN — Medication 2: at 17:20

## 2020-05-19 RX ADMIN — METFORMIN HYDROCHLORIDE 500 MILLIGRAM(S): 850 TABLET ORAL at 06:39

## 2020-05-19 RX ADMIN — HEPARIN SODIUM 7500 UNIT(S): 5000 INJECTION INTRAVENOUS; SUBCUTANEOUS at 22:02

## 2020-05-19 RX ADMIN — Medication 4: at 12:12

## 2020-05-19 RX ADMIN — BRIMONIDINE TARTRATE 1 DROP(S): 2 SOLUTION/ DROPS OPHTHALMIC at 06:42

## 2020-05-19 RX ADMIN — SPIRONOLACTONE 100 MILLIGRAM(S): 25 TABLET, FILM COATED ORAL at 06:40

## 2020-05-19 RX ADMIN — HEPARIN SODIUM 7500 UNIT(S): 5000 INJECTION INTRAVENOUS; SUBCUTANEOUS at 14:00

## 2020-05-19 RX ADMIN — AMLODIPINE BESYLATE 10 MILLIGRAM(S): 2.5 TABLET ORAL at 06:39

## 2020-05-19 RX ADMIN — Medication 20 MILLIGRAM(S): at 18:03

## 2020-05-19 RX ADMIN — GABAPENTIN 300 MILLIGRAM(S): 400 CAPSULE ORAL at 14:00

## 2020-05-19 RX ADMIN — BRIMONIDINE TARTRATE 1 DROP(S): 2 SOLUTION/ DROPS OPHTHALMIC at 21:59

## 2020-05-19 RX ADMIN — Medication 650 MILLIGRAM(S): at 14:00

## 2020-05-19 RX ADMIN — Medication 100 MILLIGRAM(S): at 22:04

## 2020-05-19 RX ADMIN — Medication 500 MILLIGRAM(S): at 13:10

## 2020-05-19 RX ADMIN — BRIMONIDINE TARTRATE 1 DROP(S): 2 SOLUTION/ DROPS OPHTHALMIC at 14:00

## 2020-05-19 RX ADMIN — HEPARIN SODIUM 7500 UNIT(S): 5000 INJECTION INTRAVENOUS; SUBCUTANEOUS at 06:41

## 2020-05-19 RX ADMIN — CHLORHEXIDINE GLUCONATE 1 APPLICATION(S): 213 SOLUTION TOPICAL at 11:52

## 2020-05-19 RX ADMIN — DORZOLAMIDE HYDROCHLORIDE 1 DROP(S): 20 SOLUTION/ DROPS OPHTHALMIC at 06:42

## 2020-05-19 RX ADMIN — PANTOPRAZOLE SODIUM 40 MILLIGRAM(S): 20 TABLET, DELAYED RELEASE ORAL at 06:39

## 2020-05-19 RX ADMIN — ATORVASTATIN CALCIUM 10 MILLIGRAM(S): 80 TABLET, FILM COATED ORAL at 22:04

## 2020-05-19 RX ADMIN — Medication 2: at 22:03

## 2020-05-19 RX ADMIN — Medication 1 APPLICATION(S): at 06:42

## 2020-05-19 RX ADMIN — METFORMIN HYDROCHLORIDE 500 MILLIGRAM(S): 850 TABLET ORAL at 19:14

## 2020-05-19 RX ADMIN — Medication 2.5 MILLIGRAM(S): at 18:03

## 2020-05-19 RX ADMIN — DORZOLAMIDE HYDROCHLORIDE 1 DROP(S): 20 SOLUTION/ DROPS OPHTHALMIC at 21:59

## 2020-05-19 RX ADMIN — DORZOLAMIDE HYDROCHLORIDE 1 DROP(S): 20 SOLUTION/ DROPS OPHTHALMIC at 14:01

## 2020-05-19 RX ADMIN — Medication 1 APPLICATION(S): at 18:07

## 2020-05-19 RX ADMIN — Medication 650 MILLIGRAM(S): at 06:41

## 2020-05-19 RX ADMIN — Medication 6 UNIT(S): at 08:35

## 2020-05-19 RX ADMIN — Medication 2.5 MILLIGRAM(S): at 06:40

## 2020-05-19 RX ADMIN — Medication 20 MILLIGRAM(S): at 06:39

## 2020-05-19 RX ADMIN — METFORMIN HYDROCHLORIDE 500 MILLIGRAM(S): 850 TABLET ORAL at 18:00

## 2020-05-19 RX ADMIN — INSULIN GLARGINE 14 UNIT(S): 100 INJECTION, SOLUTION SUBCUTANEOUS at 22:03

## 2020-05-19 NOTE — PROGRESS NOTE ADULT - ASSESSMENT
60M PMH HTN, DM + neuropathy, IVDU, ETOH liver cirrhosis, presents to [facility] s/p fall at home; found with toxic megacolon and pneumatosis; now s/p ex lap + subtotal colectomy, ileostomy (3/31). Hospital course further complicated by COVID, MEG, anemia with suspicion for GI source/varices (refused further GI workeup). Pt stable for discharge pending establishment of proper wound-care services.

## 2020-05-19 NOTE — PROGRESS NOTE ADULT - PROBLEM SELECTOR PLAN 4
nonoliguric MEG likely 2/2 pre-renal + intrinsic 2/2 COVID-19 + recent initiation/increase of Lasix  -trend BMP  -avoid nephrotoxic agents; discontinue vs decrease Lasix dosage  -gentle IV hydration if high GI output  -monitor I/Os   -renal following - appreciate recs    #Non-anion gap metabolic acidosis   -sodium bicarbonate TID  -f/u further nephrology recs

## 2020-05-19 NOTE — PROGRESS NOTE ADULT - PROBLEM SELECTOR PLAN 2
decompensated cirrhosis c/b ascites likely 2/2 alcohol abuse  -SBP ruled out, hepatic encephalopathy poa now resolved  -CT abd/pelvis with persistent large ascites and peritonitis; bilateral pleural effusions and pericardial effusion  -paracentesis on 4/10 with negative cytology  -repeat paracentesis 4/19, unable to aspirate significant amount of fluid   -abd u/s 5/3 small ascites   -Rifaximin 550mg bid for hepatic encephalopathy (poa)  -Increase to Lasix 40mg po qd and Spironolactone 100mg po qd for ascites  -holding Lactulose in setting of high ostomy output  -maximum Tylenol 2g qd  -no focal hepatic lesions seen on CT A/P w/IV contrast or abdominal U/S to suggest HCC

## 2020-05-19 NOTE — PROGRESS NOTE ADULT - SUBJECTIVE AND OBJECTIVE BOX
INTERVAL HPI/OVERNIGHT EVENTS:    Patient is a 60y old  Male who presents with a chief complaint of toxic megacolon, COVID + (06 May 2020 10:01)  Spoke to the primary team taking care of the patient. Attempted to call pt, but no answer.  No acute events.   Able to tolerate metformin 500 BID- No GI disturbances  Saturating well on RA. appetite is good.   TSH was 2.389 and Free T4 0.84    FSG & Insulin received:  Yesterday:  pre-dinner fs , 6 units lispro + 4  bedtime fs, 14  lantus   units + 2 units lispro SS  Today:  pre-breakfast fs, 6 nutritional lispro   units  pre-lunch fs    Pt reports the following symptoms:  CONSTITUTIONAL:  Negative fever or chills, feels well, good appetite  CARDIOVASCULAR:  Negative for chest pain or palpitations  RESPIRATORY:  Negative for cough, wheezing, or SOB   GASTROINTESTINAL:  Negative for nausea, vomiting, diarrhea, constipation, or abdominal pain  NEUROLOGIC:  No headache, confusion, dizziness, lightheadedness      MEDICATIONS  (STANDING):  amLODIPine   Tablet 10 milliGRAM(s) Oral daily  AQUAPHOR (petrolatum Ointment) 1 Application(s) Topical two times a day  atorvastatin 10 milliGRAM(s) Oral at bedtime  baclofen 2.5 milliGRAM(s) Oral every 12 hours  brimonidine 0.2% Ophthalmic Solution 1 Drop(s) Both EYES three times a day  chlorhexidine 2% Cloths 1 Application(s) Topical daily  dextrose 5%. 1000 milliLiter(s) (50 mL/Hr) IV Continuous <Continuous>  dextrose 50% Injectable 12.5 Gram(s) IV Push once  dextrose 50% Injectable 25 Gram(s) IV Push once  dextrose 50% Injectable 25 Gram(s) IV Push once  diphenoxylate/atropine 1 Tablet(s) Oral daily  dorzolamide 2% Ophthalmic Solution 1 Drop(s) Both EYES three times a day  furosemide    Tablet 20 milliGRAM(s) Oral once  gabapentin 300 milliGRAM(s) Oral every 8 hours  heparin   Injectable 7500 Unit(s) SubCutaneous every 8 hours  insulin glargine Injectable (LANTUS) 14 Unit(s) SubCutaneous at bedtime  insulin lispro (HumaLOG) corrective regimen sliding scale   SubCutaneous Before meals and at bedtime  insulin lispro Injectable (HumaLOG) 6 Unit(s) SubCutaneous three times a day before meals  metFORMIN 500 milliGRAM(s) Oral every 12 hours  pantoprazole    Tablet 40 milliGRAM(s) Oral every 12 hours  rifAXIMin 550 milliGRAM(s) Oral two times a day  sodium bicarbonate 650 milliGRAM(s) Oral three times a day  spironolactone 100 milliGRAM(s) Oral daily  traZODone 100 milliGRAM(s) Oral at bedtime    MEDICATIONS  (PRN):  acetaminophen   Tablet .. 500 milliGRAM(s) Oral every 6 hours PRN Mild Pain (1 - 3), Moderate Pain (4 - 6)  dextrose 40% Gel 15 Gram(s) Oral once PRN Blood Glucose LESS THAN 70 milliGRAM(s)/deciliter  glucagon  Injectable 1 milliGRAM(s) IntraMuscular once PRN Glucose LESS THAN 70 milligrams/deciliter  ondansetron    Tablet 4 milliGRAM(s) Oral every 8 hours PRN Nausea and/or Vomiting      PHYSICAL EXAM  Vital Signs Last 24 Hrs  T(C): 36.6 (19 May 2020 05:47), Max: 36.6 (18 May 2020 21:25)  T(F): 97.8 (19 May 2020 05:47), Max: 97.8 (18 May 2020 21:25)  HR: 70 (19 May 2020 05:47) (64 - 70)  BP: 138/74 (19 May 2020 05:47) (135/75 - 138/74)  BP(mean): --  RR: 18 (19 May 2020 05:47) (18 - 18)  SpO2: 99% (19 May 2020 05:47) (99% - 100%)      Due to the nature of this patient’s COVID-19 isolation status (either confirmed or suspected), this note was prepared without a bedside physical examination to prevent spread of infection and to conserve personal protective equipment during this nationwide pandemic. If possible, direct patient communication occurred via electronic measures or telephone conversation. Examination highlights were provided by a bedside nurse wearing personal protective equipment and review of pertinent medical records. Objective data (vital signs, urine output, lab results, imaging studies, medications, etc) were reviewed in detail. Face to face visits and physical examination have been limited only to patients for whom it is required for medical decision making.    LABS:        127<L>  |  94<L>  |  56<H>  ----------------------------<  205<H>  5.1   |  18<L>  |  1.77<H>    Ca    10.1      19 May 2020 10:20          Thyroid Stimulating Hormone, Serum: 2.389 uIU/mL ( @ 07:38)  Thyroid Stimulating Hormone, Serum: 3.869 uIU/mL ( @ 06:50)  Thyroid Stimulating Hormone, Serum: 3.104 uIU/mL ( @ 06:10)  Thyroid Stimulating Hormone, Serum: 2.712 uIU/mL ( @ 06:31)  Thyroid Stimulating Hormone, Serum: 3.063 uIU/mL ( @ 06:31)  Thyroid Stimulating Hormone, Serum: 3.509 uIU/mL ( @ 06:07)  Thyroid Stimulating Hormone, Serum: 3.325 uIU/mL ( @ 06:17)  Thyroid Stimulating Hormone, Serum: 2.813 uIU/mL ( @ 07:28)  Thyroid Stimulating Hormone, Serum: 2.877 uIU/mL ( @ 00:34)      HbA1C: 6.9 % ( @ 06:59)  7.6 % ( @ 05:58)    CAPILLARY BLOOD GLUCOSE      POCT Blood Glucose.: 213 mg/dL (19 May 2020 11:53)  POCT Blood Glucose.: 146 mg/dL (19 May 2020 08:00)  POCT Blood Glucose.: 152 mg/dL (18 May 2020 22:10)  POCT Blood Glucose.: 229 mg/dL (18 May 2020 17:06)      A/P 60yMale with hx of DM now with COVID, worsening renal function, moderate hyperglycemia.     1.  DM: type 2  Please continue Lantus 14 units at bedtime  PLease continue Lispro 6  units tid with meals.   Continue lispro moderate dose scale with meals and at bedtime.   Please increase to metformin 1000 mg BID - with breakfast and dinner   Continue consistent carb diet  FSG Goal 100-180  GFR 40 and EF 65%    2.  Hyperlipidemia - goal LDL < 70  - on lipitor 10 mg once daily    3.  Obesity - outpatient medical management.      4.  Hypothyroidism   - TSH on admission 2.8.   - levothyroxine 50mcg discontinued on  to assess necessity as there is no evidence of autoimmune hypothyroidism at this time.   - NO levothyroxine at this time.   - Repeat TSH 3.32 and free T4 1.03 ().   - TSH 3.5 and Free T4 1.01 on 5/3   - TSh was 2.712 and free T4 was 0.98 on   TSh was 3.104 and free T4 was 0.86 on   TSh was 2.389 and Free T4 0.84 on     For discharge, metformin 1000mg BID and glimepiride 1mg before breakfast.    Case seen and discussed with  and updated the primary team.  Pt can follow up at discharge with [facility] by calling  to make an appointment. INTERVAL HPI/OVERNIGHT EVENTS:    Patient is a 60y old  Male who presents with a chief complaint of toxic megacolon, COVID + (06 May 2020 10:01)  Spoke to the primary team taking care of the patient. Attempted to call pt, but no answer.  No acute events.   Able to tolerate metformin 500 BID- No GI disturbances  Saturating well on RA. appetite is good.   TSH was 2.389 and Free T4 0.84    FSG & Insulin received:  Yesterday:  pre-dinner fs , 6 units lispro + 4  bedtime fs, 14  lantus   units + 2 units lispro SS  Today:  pre-breakfast fs, 6 nutritional lispro   units  pre-lunch fs    Pt reports the following symptoms:  CONSTITUTIONAL:  Negative fever or chills, feels well, good appetite  CARDIOVASCULAR:  Negative for chest pain or palpitations  RESPIRATORY:  Negative for cough, wheezing, or SOB   GASTROINTESTINAL:  Negative for nausea, vomiting, diarrhea, constipation, or abdominal pain  NEUROLOGIC:  No headache, confusion, dizziness, lightheadedness      MEDICATIONS  (STANDING):  amLODIPine   Tablet 10 milliGRAM(s) Oral daily  AQUAPHOR (petrolatum Ointment) 1 Application(s) Topical two times a day  atorvastatin 10 milliGRAM(s) Oral at bedtime  baclofen 2.5 milliGRAM(s) Oral every 12 hours  brimonidine 0.2% Ophthalmic Solution 1 Drop(s) Both EYES three times a day  chlorhexidine 2% Cloths 1 Application(s) Topical daily  dextrose 5%. 1000 milliLiter(s) (50 mL/Hr) IV Continuous <Continuous>  dextrose 50% Injectable 12.5 Gram(s) IV Push once  dextrose 50% Injectable 25 Gram(s) IV Push once  dextrose 50% Injectable 25 Gram(s) IV Push once  diphenoxylate/atropine 1 Tablet(s) Oral daily  dorzolamide 2% Ophthalmic Solution 1 Drop(s) Both EYES three times a day  furosemide    Tablet 20 milliGRAM(s) Oral once  gabapentin 300 milliGRAM(s) Oral every 8 hours  heparin   Injectable 7500 Unit(s) SubCutaneous every 8 hours  insulin glargine Injectable (LANTUS) 14 Unit(s) SubCutaneous at bedtime  insulin lispro (HumaLOG) corrective regimen sliding scale   SubCutaneous Before meals and at bedtime  insulin lispro Injectable (HumaLOG) 6 Unit(s) SubCutaneous three times a day before meals  metFORMIN 500 milliGRAM(s) Oral every 12 hours  pantoprazole    Tablet 40 milliGRAM(s) Oral every 12 hours  rifAXIMin 550 milliGRAM(s) Oral two times a day  sodium bicarbonate 650 milliGRAM(s) Oral three times a day  spironolactone 100 milliGRAM(s) Oral daily  traZODone 100 milliGRAM(s) Oral at bedtime    MEDICATIONS  (PRN):  acetaminophen   Tablet .. 500 milliGRAM(s) Oral every 6 hours PRN Mild Pain (1 - 3), Moderate Pain (4 - 6)  dextrose 40% Gel 15 Gram(s) Oral once PRN Blood Glucose LESS THAN 70 milliGRAM(s)/deciliter  glucagon  Injectable 1 milliGRAM(s) IntraMuscular once PRN Glucose LESS THAN 70 milligrams/deciliter  ondansetron    Tablet 4 milliGRAM(s) Oral every 8 hours PRN Nausea and/or Vomiting      PHYSICAL EXAM  Vital Signs Last 24 Hrs  T(C): 36.6 (19 May 2020 05:47), Max: 36.6 (18 May 2020 21:25)  T(F): 97.8 (19 May 2020 05:47), Max: 97.8 (18 May 2020 21:25)  HR: 70 (19 May 2020 05:47) (64 - 70)  BP: 138/74 (19 May 2020 05:47) (135/75 - 138/74)  BP(mean): --  RR: 18 (19 May 2020 05:47) (18 - 18)  SpO2: 99% (19 May 2020 05:47) (99% - 100%)      Due to the nature of this patient’s COVID-19 isolation status (either confirmed or suspected), this note was prepared without a bedside physical examination to prevent spread of infection and to conserve personal protective equipment during this nationwide pandemic. If possible, direct patient communication occurred via electronic measures or telephone conversation. Examination highlights were provided by a bedside nurse wearing personal protective equipment and review of pertinent medical records. Objective data (vital signs, urine output, lab results, imaging studies, medications, etc) were reviewed in detail. Face to face visits and physical examination have been limited only to patients for whom it is required for medical decision making.    LABS:        127<L>  |  94<L>  |  56<H>  ----------------------------<  205<H>  5.1   |  18<L>  |  1.77<H>    Ca    10.1      19 May 2020 10:20          Thyroid Stimulating Hormone, Serum: 2.389 uIU/mL ( @ 07:38)  Thyroid Stimulating Hormone, Serum: 3.869 uIU/mL ( @ 06:50)  Thyroid Stimulating Hormone, Serum: 3.104 uIU/mL ( @ 06:10)  Thyroid Stimulating Hormone, Serum: 2.712 uIU/mL ( @ 06:31)  Thyroid Stimulating Hormone, Serum: 3.063 uIU/mL ( @ 06:31)  Thyroid Stimulating Hormone, Serum: 3.509 uIU/mL ( @ 06:07)  Thyroid Stimulating Hormone, Serum: 3.325 uIU/mL ( @ 06:17)  Thyroid Stimulating Hormone, Serum: 2.813 uIU/mL ( @ 07:28)  Thyroid Stimulating Hormone, Serum: 2.877 uIU/mL ( @ 00:34)      HbA1C: 6.9 % ( @ 06:59)  7.6 % ( @ 05:58)    CAPILLARY BLOOD GLUCOSE      POCT Blood Glucose.: 213 mg/dL (19 May 2020 11:53)  POCT Blood Glucose.: 146 mg/dL (19 May 2020 08:00)  POCT Blood Glucose.: 152 mg/dL (18 May 2020 22:10)  POCT Blood Glucose.: 229 mg/dL (18 May 2020 17:06)      A/P 60yMale with hx of DM now with COVID, worsening renal function, moderate hyperglycemia.     1.  DM: type 2  Please continue Lantus 14 units at bedtime  PLease increase Lispro to 8 units tid with meals.   Continue lispro moderate dose scale with meals and at bedtime.   Please increase to metformin 1000 mg BID - with breakfast and dinner   Continue consistent carb diet  FSG Goal 100-180  GFR 40 and EF 65%    2.  Hyperlipidemia - goal LDL < 70  - on lipitor 10 mg once daily    3.  Obesity - outpatient medical management.      4.  Hypothyroidism   - TSH on admission 2.8.   - levothyroxine 50mcg discontinued on  to assess necessity as there is no evidence of autoimmune hypothyroidism at this time.   - NO levothyroxine at this time.   - Repeat TSH 3.32 and free T4 1.03 ().   - TSH 3.5 and Free T4 1.01 on 5/3   - TSh was 2.712 and free T4 was 0.98 on   TSh was 3.104 and free T4 was 0.86 on   TSh was 2.389 and Free T4 0.84 on     For discharge, metformin 1000mg BID and glimepiride 1mg before breakfast.    Case seen and discussed with  and updated the primary team.  Pt can follow up at discharge with [facility] by calling  to make an appointment.

## 2020-05-19 NOTE — PROGRESS NOTE ADULT - ATTENDING COMMENTS
Post-prandial glucoses remain somewhat high.  Metformin has not yet been increased but doubt that this would help with post-prandial control.  Will increase the premeal lispro to 8 units

## 2020-05-19 NOTE — ADVANCED PRACTICE NURSE CONSULT - ASSESSMENT
Attempted again to teach pt ostomy care. Pt able to cut hole in ostomy appliance with verbal direction but is unable to apply skin barrier to abdomen due to poor vision and distended abdomen. Assisted pt in applying Triad cream to surgical incisions but is also unable to visualize the inferior wound. Continue to remind pt to empty before the appliance gets too full.

## 2020-05-19 NOTE — PROGRESS NOTE ADULT - PROBLEM SELECTOR PLAN 10
F: none  E: replete prn to K>4; Mg>2   N: low fiber, consistent carbs  GI ppx: protonix BID   DVT ppx: heparin 7,500u sc q8h    DISPO pending establishment of proper woundcare upon discharge

## 2020-05-19 NOTE — PROGRESS NOTE ADULT - PROBLEM SELECTOR PLAN 7
Type 2 DM, endocrinology following   -lantus 14U qhs  -lispro 6U TID pre-meal   -mISS  -metformin 500mg bid  -continue to appreciate endo recs    #hypothyroidism   -TSH 2.813; TPO and antithyroid Abs neg   -levothyroxine 50mcg discontinued per endocrinology 4/29 given low concern for autoimmune hypothyroidism at this time; will assess necessity of continuation of levothyroxine  -continue to appreciate endocrine recs Type 2 DM, endocrinology following   -lantus 14U qhs  -lispro 6U TID pre-meal   -mISS  -metformin 500mg bid  -per endo, metformin to be increased to 1000mg bid however pharmacy would not approve as exceeds maximum dosage inpatient  -continue to appreciate endo recs    #hypothyroidism   -TSH 2.813; TPO and antithyroid Abs neg   -levothyroxine 50mcg discontinued per endocrinology 4/29 given low concern for autoimmune hypothyroidism at this time; will assess necessity of continuation of levothyroxine  -continue to appreciate endocrine recs

## 2020-05-19 NOTE — PROGRESS NOTE ADULT - PROBLEM SELECTOR PLAN 1
non oliguric MEG, perfusional/ATN pic   renal fx remained stable, hyponatremia most likely from hypervolemia i/s/o cirrhosis, back on lasix/spironolactone  nephrologically stable  - would titrate up lasix to 40 mg for [id number] also would help to maintain normokalemia    - please increase NaBicab to 1300 TID  - add 1.5 L fluid restriction to diet   Will follow

## 2020-05-19 NOTE — PROGRESS NOTE ADULT - ATTENDING COMMENTS
persistent hyponatremia, azotemia, metabolic acidosis  K stable at 5.1 on aldactone.  may need potassium lowering agent  Na down a bit more-  127- now need to limit fluids  okay to diuretics for cirrhosis  CO2 18- consider increasing bicarbonate to 2 tabs TID to get CO2 > 20

## 2020-05-19 NOTE — PROGRESS NOTE ADULT - PROBLEM SELECTOR PLAN 3
now s/p 3/31/20 ex lap with subtotal colectomy+ileostomy  course c/b fever and purulent wound infection (4/9) s/p vanc/zosyn  -s/p laparotomy, daily dressings for midline incision  -ileostomy in place draining brown stool with no signs of bleeding  -lomotil low dose for high ostomy output  -general surgery following, appreciate recs  -woundcare

## 2020-05-19 NOTE — PROGRESS NOTE ADULT - ATTENDING COMMENTS
Dispo to [facility] pending wound care services.    -Will d/w Social work whether he needs to be COVID negative prior to discharge (COVID+ on 5/14)  -Discontinue labs

## 2020-05-19 NOTE — PROGRESS NOTE ADULT - SUBJECTIVE AND OBJECTIVE BOX
OVERNIGHT EVENTS: No acute events overnight.  SUBJECTIVE: Patient seen and examined at the bedside. Patient expresses continued frustration regarding dispo situation; demanding to speak to a . Patient denies new complaints at this time. Denies pain, sob; 12-point ROS reviewed and is otherwise negative.  Vital Signs Last 12 Hrs  T(F): 97.8 (05-19-20 @ 05:47), Max: 97.8 (05-18-20 @ 21:25)  HR: 70 (05-19-20 @ 05:47) (64 - 70)  BP: 138/74 (05-19-20 @ 05:47) (135/75 - 138/74)  BP(mean): --  RR: 18 (05-19-20 @ 05:47) (18 - 18)  SpO2: 99% (05-19-20 @ 05:47) (99% - 100%)    I&O's Summary  18 May 2020 07:01  -  19 May 2020 07:00  --------------------------------------------------------  IN: 180 mL / OUT: 2250 mL / NET: -2070 mL    PHYSICAL EXAM: patient resistant to full examination  General: NAD, resting comfortably in bed  HEENT: NCAT, MMM  Respiratory: normal respiratory rate/depth/effort  Neuro: AAOx3, no focal neurological deficits    LABS:    05-18    129<L>  |  94<L>  |  50<H>  ----------------------------<  160<H>  5.3   |  18<L>  |  1.84<H>    Ca    9.4      18 May 2020 07:38    RADIOLOGY & ADDITIONAL TESTS: Reviewed    MEDICATIONS  (STANDING):  amLODIPine   Tablet 10 milliGRAM(s) Oral daily  AQUAPHOR (petrolatum Ointment) 1 Application(s) Topical two times a day  atorvastatin 10 milliGRAM(s) Oral at bedtime  baclofen 2.5 milliGRAM(s) Oral every 12 hours  brimonidine 0.2% Ophthalmic Solution 1 Drop(s) Both EYES three times a day  chlorhexidine 2% Cloths 1 Application(s) Topical daily  dextrose 5%. 1000 milliLiter(s) (50 mL/Hr) IV Continuous <Continuous>  dextrose 50% Injectable 12.5 Gram(s) IV Push once  dextrose 50% Injectable 25 Gram(s) IV Push once  dextrose 50% Injectable 25 Gram(s) IV Push once  diphenoxylate/atropine 1 Tablet(s) Oral daily  dorzolamide 2% Ophthalmic Solution 1 Drop(s) Both EYES three times a day  furosemide    Tablet 20 milliGRAM(s) Oral daily  gabapentin 300 milliGRAM(s) Oral every 8 hours  heparin   Injectable 7500 Unit(s) SubCutaneous every 8 hours  insulin glargine Injectable (LANTUS) 14 Unit(s) SubCutaneous at bedtime  insulin lispro (HumaLOG) corrective regimen sliding scale   SubCutaneous Before meals and at bedtime  insulin lispro Injectable (HumaLOG) 6 Unit(s) SubCutaneous three times a day before meals  metFORMIN 500 milliGRAM(s) Oral every 12 hours  pantoprazole    Tablet 40 milliGRAM(s) Oral every 12 hours  rifAXIMin 550 milliGRAM(s) Oral two times a day  sodium bicarbonate 650 milliGRAM(s) Oral three times a day  spironolactone 100 milliGRAM(s) Oral daily  traZODone 100 milliGRAM(s) Oral at bedtime    MEDICATIONS  (PRN):  acetaminophen   Tablet .. 500 milliGRAM(s) Oral every 6 hours PRN Mild Pain (1 - 3), Moderate Pain (4 - 6)  dextrose 40% Gel 15 Gram(s) Oral once PRN Blood Glucose LESS THAN 70 milliGRAM(s)/deciliter  glucagon  Injectable 1 milliGRAM(s) IntraMuscular once PRN Glucose LESS THAN 70 milligrams/deciliter  ondansetron    Tablet 4 milliGRAM(s) Oral every 8 hours PRN Nausea and/or Vomiting

## 2020-05-19 NOTE — CHART NOTE - NSCHARTNOTEFT_GEN_A_CORE
Admitting Diagnosis:   Patient is a 60y old  Male who presents with a chief complaint of toxic megacolon, COVID + (06 May 2020 10:01)      PAST MEDICAL & SURGICAL HISTORY:  Anemia  ETOH abuse  HLD (hyperlipidemia)  HTN (hypertension)  DM (diabetes mellitus)  No significant past surgical history      Current Nutrition Order:[facility] no snack/DASH/Low fiber       PO Intake: Good (%) [  x ]  Fair (50-75%) [   ] Poor (<25%) [   ]    GI Issues: None    Pain:none    Skin Integrity:surgical incision    Labs:   05-19    127<L>  |  94<L>  |  56<H>  ----------------------------<  205<H>  5.1   |  18<L>  |  1.77<H>    Ca    10.1      19 May 2020 10:20      CAPILLARY BLOOD GLUCOSE      POCT Blood Glucose.: 213 mg/dL (19 May 2020 11:53)  POCT Blood Glucose.: 146 mg/dL (19 May 2020 08:00)  POCT Blood Glucose.: 152 mg/dL (18 May 2020 22:10)  POCT Blood Glucose.: 229 mg/dL (18 May 2020 17:06)      Medications:  MEDICATIONS  (STANDING):  amLODIPine   Tablet 10 milliGRAM(s) Oral daily  AQUAPHOR (petrolatum Ointment) 1 Application(s) Topical two times a day  atorvastatin 10 milliGRAM(s) Oral at bedtime  baclofen 2.5 milliGRAM(s) Oral every 12 hours  brimonidine 0.2% Ophthalmic Solution 1 Drop(s) Both EYES three times a day  chlorhexidine 2% Cloths 1 Application(s) Topical daily  dextrose 5%. 1000 milliLiter(s) (50 mL/Hr) IV Continuous <Continuous>  dextrose 50% Injectable 12.5 Gram(s) IV Push once  dextrose 50% Injectable 25 Gram(s) IV Push once  dextrose 50% Injectable 25 Gram(s) IV Push once  diphenoxylate/atropine 1 Tablet(s) Oral daily  dorzolamide 2% Ophthalmic Solution 1 Drop(s) Both EYES three times a day  furosemide    Tablet 20 milliGRAM(s) Oral once  gabapentin 300 milliGRAM(s) Oral every 8 hours  heparin   Injectable 7500 Unit(s) SubCutaneous every 8 hours  insulin glargine Injectable (LANTUS) 14 Unit(s) SubCutaneous at bedtime  insulin lispro (HumaLOG) corrective regimen sliding scale   SubCutaneous Before meals and at bedtime  insulin lispro Injectable (HumaLOG) 6 Unit(s) SubCutaneous three times a day before meals  metFORMIN 500 milliGRAM(s) Oral every 12 hours  pantoprazole    Tablet 40 milliGRAM(s) Oral every 12 hours  rifAXIMin 550 milliGRAM(s) Oral two times a day  sodium bicarbonate 650 milliGRAM(s) Oral three times a day  spironolactone 100 milliGRAM(s) Oral daily  traZODone 100 milliGRAM(s) Oral at bedtime    MEDICATIONS  (PRN):  acetaminophen   Tablet .. 500 milliGRAM(s) Oral every 6 hours PRN Mild Pain (1 - 3), Moderate Pain (4 - 6)  dextrose 40% Gel 15 Gram(s) Oral once PRN Blood Glucose LESS THAN 70 milliGRAM(s)/deciliter  glucagon  Injectable 1 milliGRAM(s) IntraMuscular once PRN Glucose LESS THAN 70 milligrams/deciliter  ondansetron    Tablet 4 milliGRAM(s) Oral every 8 hours PRN Nausea and/or Vomiting      Weight:90.7kg  Daily     Daily     Weight Change: no updated weights    Estimated energy needs: Based on IBW due to above 120%of IBW.IBW:67kg [id number] and 0.8-1gmprotein due to worsening renal function noted;67gmprotein and 1.5liter fluid per nephrology recommendations due to noted hyponatremia.    Subjective: 59y/o male with history of toxic megacolon/COVID-19+,HTN.2DM,neuropathy,IVDU/ETOH abuse with liver cirrhosis/ascites admitted with megacolon s/p subtotal colectomy .ileostomy, complicated by MEG and anemia.Issue regarding ability to be able change ileostomy by himself before D/C.Eating %.Needs updated weights    Previous Nutrition Diagnosis:Increased nutrient needs r/t increased demand for protein and nutrients AEB: surgical and COVID-19+ demands    Active [ x  ]  Resolved [   ]    If resolved, new PES:     Goal :Meet 80% of needs consistently    Recommendations:1.Add low Phos/1.5liter fluid restriction to diet order 2.Please obtain updated weights    Education: completed    Risk Level: High [   ] Moderate [  x ] Low [   ]

## 2020-05-19 NOTE — PROGRESS NOTE ADULT - SUBJECTIVE AND OBJECTIVE BOX
O/N Events:  [facility]  labs and vitals reviewed, has worsening of hyponatremia sNa down to 127/ K 5.1/ renal fx and acidosis unchanged bicarb 18    VITALS  Vital Signs Last 24 Hrs  T(C): 36.6 (19 May 2020 05:47), Max: 36.6 (18 May 2020 21:25)  T(F): 97.8 (19 May 2020 05:47), Max: 97.8 (18 May 2020 21:25)  HR: 70 (19 May 2020 05:47) (64 - 76)  BP: 138/74 (19 May 2020 05:47) (123/67 - 138/74)  BP(mean): 86 (18 May 2020 12:21) (86 - 86)  RR: 18 (19 May 2020 05:47) (18 - 18)  SpO2: 99% (19 May 2020 05:47) (99% - 100%)    PHYSICAL EXAM  deferred per covid protocol     MEDICATIONS  (STANDING):  amLODIPine   Tablet 10 milliGRAM(s) Oral daily  AQUAPHOR (petrolatum Ointment) 1 Application(s) Topical two times a day  atorvastatin 10 milliGRAM(s) Oral at bedtime  baclofen 2.5 milliGRAM(s) Oral every 12 hours  brimonidine 0.2% Ophthalmic Solution 1 Drop(s) Both EYES three times a day  chlorhexidine 2% Cloths 1 Application(s) Topical daily  dextrose 5%. 1000 milliLiter(s) (50 mL/Hr) IV Continuous <Continuous>  dextrose 50% Injectable 12.5 Gram(s) IV Push once  dextrose 50% Injectable 25 Gram(s) IV Push once  dextrose 50% Injectable 25 Gram(s) IV Push once  diphenoxylate/atropine 1 Tablet(s) Oral daily  dorzolamide 2% Ophthalmic Solution 1 Drop(s) Both EYES three times a day  furosemide    Tablet 20 milliGRAM(s) Oral once  gabapentin 300 milliGRAM(s) Oral every 8 hours  heparin   Injectable 7500 Unit(s) SubCutaneous every 8 hours  insulin glargine Injectable (LANTUS) 14 Unit(s) SubCutaneous at bedtime  insulin lispro (HumaLOG) corrective regimen sliding scale   SubCutaneous Before meals and at bedtime  insulin lispro Injectable (HumaLOG) 6 Unit(s) SubCutaneous three times a day before meals  metFORMIN 500 milliGRAM(s) Oral every 12 hours  pantoprazole    Tablet 40 milliGRAM(s) Oral every 12 hours  rifAXIMin 550 milliGRAM(s) Oral two times a day  sodium bicarbonate 650 milliGRAM(s) Oral three times a day  spironolactone 100 milliGRAM(s) Oral daily  traZODone 100 milliGRAM(s) Oral at bedtime    MEDICATIONS  (PRN):  acetaminophen   Tablet .. 500 milliGRAM(s) Oral every 6 hours PRN Mild Pain (1 - 3), Moderate Pain (4 - 6)  dextrose 40% Gel 15 Gram(s) Oral once PRN Blood Glucose LESS THAN 70 milliGRAM(s)/deciliter  glucagon  Injectable 1 milliGRAM(s) IntraMuscular once PRN Glucose LESS THAN 70 milligrams/deciliter  ondansetron    Tablet 4 milliGRAM(s) Oral every 8 hours PRN Nausea and/or Vomiting    LABS    05-19    127<L>  |  94<L>  |  56<H>  ----------------------------<  205<H>  5.1   |  18<L>  |  1.77<H>    Ca    10.1      19 May 2020 10:20

## 2020-05-20 DIAGNOSIS — K72.90 HEPATIC FAILURE, UNSPECIFIED WITHOUT COMA: ICD-10-CM

## 2020-05-20 DIAGNOSIS — U07.1 COVID-19: ICD-10-CM

## 2020-05-20 DIAGNOSIS — E11.40 TYPE 2 DIABETES MELLITUS WITH DIABETIC NEUROPATHY, UNSPECIFIED: ICD-10-CM

## 2020-05-20 DIAGNOSIS — I10 ESSENTIAL (PRIMARY) HYPERTENSION: ICD-10-CM

## 2020-05-20 DIAGNOSIS — N17.9 ACUTE KIDNEY FAILURE, UNSPECIFIED: ICD-10-CM

## 2020-05-20 DIAGNOSIS — R19.8 OTHER SPECIFIED SYMPTOMS AND SIGNS INVOLVING THE DIGESTIVE SYSTEM AND ABDOMEN: ICD-10-CM

## 2020-05-20 DIAGNOSIS — E87.1 HYPO-OSMOLALITY AND HYPONATREMIA: ICD-10-CM

## 2020-05-20 LAB
ANION GAP SERPL CALC-SCNC: 15 MMOL/L — SIGNIFICANT CHANGE UP (ref 5–17)
BUN SERPL-MCNC: 59 MG/DL — HIGH (ref 7–23)
CALCIUM SERPL-MCNC: 10.2 MG/DL — SIGNIFICANT CHANGE UP (ref 8.4–10.5)
CHLORIDE SERPL-SCNC: 89 MMOL/L — LOW (ref 96–108)
CO2 SERPL-SCNC: 22 MMOL/L — SIGNIFICANT CHANGE UP (ref 22–31)
CREAT SERPL-MCNC: 1.79 MG/DL — HIGH (ref 0.5–1.3)
GLUCOSE BLDC GLUCOMTR-MCNC: 157 MG/DL — HIGH (ref 70–99)
GLUCOSE BLDC GLUCOMTR-MCNC: 186 MG/DL — HIGH (ref 70–99)
GLUCOSE BLDC GLUCOMTR-MCNC: 196 MG/DL — HIGH (ref 70–99)
GLUCOSE BLDC GLUCOMTR-MCNC: 264 MG/DL — HIGH (ref 70–99)
GLUCOSE SERPL-MCNC: 127 MG/DL — HIGH (ref 70–99)
PHOSPHATE SERPL-MCNC: 4.8 MG/DL — HIGH (ref 2.5–4.5)
POTASSIUM SERPL-MCNC: 5.1 MMOL/L — SIGNIFICANT CHANGE UP (ref 3.5–5.3)
POTASSIUM SERPL-SCNC: 5.1 MMOL/L — SIGNIFICANT CHANGE UP (ref 3.5–5.3)
SARS-COV-2 RNA SPEC QL NAA+PROBE: SIGNIFICANT CHANGE UP
SODIUM SERPL-SCNC: 126 MMOL/L — LOW (ref 135–145)

## 2020-05-20 PROCEDURE — 99231 SBSQ HOSP IP/OBS SF/LOW 25: CPT | Mod: GC

## 2020-05-20 PROCEDURE — 99233 SBSQ HOSP IP/OBS HIGH 50: CPT | Mod: GC

## 2020-05-20 PROCEDURE — 99232 SBSQ HOSP IP/OBS MODERATE 35: CPT

## 2020-05-20 RX ORDER — METFORMIN HYDROCHLORIDE 850 MG/1
1000 TABLET ORAL
Refills: 0 | Status: DISCONTINUED | OUTPATIENT
Start: 2020-05-20 | End: 2020-05-25

## 2020-05-20 RX ORDER — SODIUM CHLORIDE 9 MG/ML
1 INJECTION INTRAMUSCULAR; INTRAVENOUS; SUBCUTANEOUS THREE TIMES A DAY
Refills: 0 | Status: DISCONTINUED | OUTPATIENT
Start: 2020-05-20 | End: 2020-06-05

## 2020-05-20 RX ADMIN — DORZOLAMIDE HYDROCHLORIDE 1 DROP(S): 20 SOLUTION/ DROPS OPHTHALMIC at 21:47

## 2020-05-20 RX ADMIN — METFORMIN HYDROCHLORIDE 500 MILLIGRAM(S): 850 TABLET ORAL at 05:54

## 2020-05-20 RX ADMIN — Medication 650 MILLIGRAM(S): at 05:55

## 2020-05-20 RX ADMIN — INSULIN GLARGINE 14 UNIT(S): 100 INJECTION, SOLUTION SUBCUTANEOUS at 21:59

## 2020-05-20 RX ADMIN — SODIUM CHLORIDE 1 GRAM(S): 9 INJECTION INTRAMUSCULAR; INTRAVENOUS; SUBCUTANEOUS at 21:45

## 2020-05-20 RX ADMIN — HEPARIN SODIUM 7500 UNIT(S): 5000 INJECTION INTRAVENOUS; SUBCUTANEOUS at 21:45

## 2020-05-20 RX ADMIN — GABAPENTIN 300 MILLIGRAM(S): 400 CAPSULE ORAL at 21:45

## 2020-05-20 RX ADMIN — Medication 40 MILLIGRAM(S): at 05:54

## 2020-05-20 RX ADMIN — GABAPENTIN 300 MILLIGRAM(S): 400 CAPSULE ORAL at 05:54

## 2020-05-20 RX ADMIN — Medication 1 APPLICATION(S): at 18:44

## 2020-05-20 RX ADMIN — BRIMONIDINE TARTRATE 1 DROP(S): 2 SOLUTION/ DROPS OPHTHALMIC at 15:23

## 2020-05-20 RX ADMIN — Medication 650 MILLIGRAM(S): at 14:30

## 2020-05-20 RX ADMIN — BRIMONIDINE TARTRATE 1 DROP(S): 2 SOLUTION/ DROPS OPHTHALMIC at 21:47

## 2020-05-20 RX ADMIN — Medication 1 APPLICATION(S): at 05:52

## 2020-05-20 RX ADMIN — SODIUM CHLORIDE 1 GRAM(S): 9 INJECTION INTRAMUSCULAR; INTRAVENOUS; SUBCUTANEOUS at 14:30

## 2020-05-20 RX ADMIN — Medication 8 UNIT(S): at 12:24

## 2020-05-20 RX ADMIN — DORZOLAMIDE HYDROCHLORIDE 1 DROP(S): 20 SOLUTION/ DROPS OPHTHALMIC at 15:23

## 2020-05-20 RX ADMIN — METFORMIN HYDROCHLORIDE 1000 MILLIGRAM(S): 850 TABLET ORAL at 18:54

## 2020-05-20 RX ADMIN — Medication 500 MILLIGRAM(S): at 21:58

## 2020-05-20 RX ADMIN — Medication 1 TABLET(S): at 12:22

## 2020-05-20 RX ADMIN — BRIMONIDINE TARTRATE 1 DROP(S): 2 SOLUTION/ DROPS OPHTHALMIC at 05:52

## 2020-05-20 RX ADMIN — PANTOPRAZOLE SODIUM 40 MILLIGRAM(S): 20 TABLET, DELAYED RELEASE ORAL at 18:54

## 2020-05-20 RX ADMIN — Medication 2: at 08:48

## 2020-05-20 RX ADMIN — PANTOPRAZOLE SODIUM 40 MILLIGRAM(S): 20 TABLET, DELAYED RELEASE ORAL at 05:09

## 2020-05-20 RX ADMIN — HEPARIN SODIUM 7500 UNIT(S): 5000 INJECTION INTRAVENOUS; SUBCUTANEOUS at 05:55

## 2020-05-20 RX ADMIN — HEPARIN SODIUM 7500 UNIT(S): 5000 INJECTION INTRAVENOUS; SUBCUTANEOUS at 14:30

## 2020-05-20 RX ADMIN — Medication 8 UNIT(S): at 16:50

## 2020-05-20 RX ADMIN — Medication 2.5 MILLIGRAM(S): at 05:57

## 2020-05-20 RX ADMIN — CHLORHEXIDINE GLUCONATE 1 APPLICATION(S): 213 SOLUTION TOPICAL at 12:25

## 2020-05-20 RX ADMIN — Medication 100 MILLIGRAM(S): at 21:45

## 2020-05-20 RX ADMIN — DORZOLAMIDE HYDROCHLORIDE 1 DROP(S): 20 SOLUTION/ DROPS OPHTHALMIC at 05:52

## 2020-05-20 RX ADMIN — Medication 2: at 16:50

## 2020-05-20 RX ADMIN — Medication 2: at 12:24

## 2020-05-20 RX ADMIN — AMLODIPINE BESYLATE 10 MILLIGRAM(S): 2.5 TABLET ORAL at 05:54

## 2020-05-20 RX ADMIN — GABAPENTIN 300 MILLIGRAM(S): 400 CAPSULE ORAL at 14:30

## 2020-05-20 RX ADMIN — ATORVASTATIN CALCIUM 10 MILLIGRAM(S): 80 TABLET, FILM COATED ORAL at 21:45

## 2020-05-20 RX ADMIN — Medication 650 MILLIGRAM(S): at 21:45

## 2020-05-20 RX ADMIN — SPIRONOLACTONE 100 MILLIGRAM(S): 25 TABLET, FILM COATED ORAL at 05:54

## 2020-05-20 RX ADMIN — Medication 6: at 21:59

## 2020-05-20 RX ADMIN — Medication 2.5 MILLIGRAM(S): at 18:52

## 2020-05-20 RX ADMIN — Medication 8 UNIT(S): at 08:47

## 2020-05-20 NOTE — PROGRESS NOTE ADULT - SUBJECTIVE AND OBJECTIVE BOX
Patient is a 60y old  Male who presents with a chief complaint of toxic megacolon, COVID + (06 May 2020 10:01)      INTERVAL HPI/OVERNIGHT EVENTS:    Pt. seen and examined earlier today  Pt. feels well, denies F/C, CP, SOB, cough    Review of Systems: 12 point review of systems otherwise negative    MEDICATIONS  (STANDING):  amLODIPine   Tablet 10 milliGRAM(s) Oral daily  AQUAPHOR (petrolatum Ointment) 1 Application(s) Topical two times a day  atorvastatin 10 milliGRAM(s) Oral at bedtime  baclofen 2.5 milliGRAM(s) Oral every 12 hours  brimonidine 0.2% Ophthalmic Solution 1 Drop(s) Both EYES three times a day  chlorhexidine 2% Cloths 1 Application(s) Topical daily  dextrose 5%. 1000 milliLiter(s) (50 mL/Hr) IV Continuous <Continuous>  dextrose 50% Injectable 12.5 Gram(s) IV Push once  dextrose 50% Injectable 25 Gram(s) IV Push once  dextrose 50% Injectable 25 Gram(s) IV Push once  diphenoxylate/atropine 1 Tablet(s) Oral daily  dorzolamide 2% Ophthalmic Solution 1 Drop(s) Both EYES three times a day  furosemide    Tablet 40 milliGRAM(s) Oral every 24 hours  gabapentin 300 milliGRAM(s) Oral every 8 hours  heparin   Injectable 7500 Unit(s) SubCutaneous every 8 hours  insulin glargine Injectable (LANTUS) 14 Unit(s) SubCutaneous at bedtime  insulin lispro (HumaLOG) corrective regimen sliding scale   SubCutaneous Before meals and at bedtime  insulin lispro Injectable (HumaLOG) 8 Unit(s) SubCutaneous three times a day before meals  metFORMIN 500 milliGRAM(s) Oral every 12 hours  pantoprazole    Tablet 40 milliGRAM(s) Oral every 12 hours  rifAXIMin 550 milliGRAM(s) Oral two times a day  sodium bicarbonate 650 milliGRAM(s) Oral three times a day  spironolactone 100 milliGRAM(s) Oral daily  traZODone 100 milliGRAM(s) Oral at bedtime    MEDICATIONS  (PRN):  acetaminophen   Tablet .. 500 milliGRAM(s) Oral every 6 hours PRN Mild Pain (1 - 3), Moderate Pain (4 - 6)  dextrose 40% Gel 15 Gram(s) Oral once PRN Blood Glucose LESS THAN 70 milliGRAM(s)/deciliter  glucagon  Injectable 1 milliGRAM(s) IntraMuscular once PRN Glucose LESS THAN 70 milligrams/deciliter  ondansetron    Tablet 4 milliGRAM(s) Oral every 8 hours PRN Nausea and/or Vomiting      Allergies    No Known Allergies    Intolerances          Vital Signs Last 24 Hrs  T(C): 36.5 (20 May 2020 05:46), Max: 36.8 (19 May 2020 13:40)  T(F): 97.7 (20 May 2020 05:46), Max: 98.2 (19 May 2020 13:40)  HR: 66 (20 May 2020 05:46) (58 - 66)  BP: 142/78 (20 May 2020 05:46) (119/76 - 148/67)  BP(mean): --  RR: 19 (20 May 2020 05:46) (18 - 20)  SpO2: 99% (20 May 2020 05:46) (99% - 100%)  CAPILLARY BLOOD GLUCOSE      POCT Blood Glucose.: 157 mg/dL (20 May 2020 11:49)  POCT Blood Glucose.: 186 mg/dL (20 May 2020 07:53)  POCT Blood Glucose.: 162 mg/dL (19 May 2020 21:47)  POCT Blood Glucose.: 161 mg/dL (19 May 2020 16:57)      05-19 @ 07:01  -  05-20 @ 07:00  --------------------------------------------------------  IN: 1930 mL / OUT: 3450 mL / NET: -1520 mL        Physical Exam: (earlier today)    Daily     Daily   General: well-appearing in NAD  HEENT: MMM  Lungs:  normal WOB on RA  Abdomen:  +ileostomy, surgical dressing C/D/I  Skin:  WWP  Neuro:  AAOx3    LABS:    05-19    127<L>  |  94<L>  |  56<H>  ----------------------------<  205<H>  5.1   |  18<L>  |  1.77<H>    Ca    10.1      19 May 2020 10:20              RADIOLOGY & ADDITIONAL TESTS:    ---------------------------------------------------------------------------  I personally reviewed: [  ]EKG   [  ]CXR    [  ] CT    [  ]Other  ---------------------------------------------------------------------------  PLEASE CHECK WHEN PRESENT:     [  ]Heart Failure     [  ] Acute     [  ] Acute on Chronic     [  ] Chronic  -------------------------------------------------------------------     [  ]Diastolic [HFpEF]     [  ]Systolic [HFrEF]     [  ]Combined [HFpEF & HFrEF]     [  ]Other:  -------------------------------------------------------------------  [  ]MEG     [  ]ATN     [  ]Reneal Medullary Necrosis     [  ]Renal Cortical Necrosis     [  ]Other Pathological Lesions:    [  ]CKD 1  [  ]CKD 2  [  ]CKD 3  [  ]CKD 4  [  ]CKD 5  [  ]Other  -------------------------------------------------------------------  [  ]Other/Unspecified:    --------------------------------------------------------------------    Abdominal Nutritional Status  [  ]Malnutrition: See Nutrition Note  [  ]Cachexia  [  ]Other:   [  ]Supplement Ordered:  [  ]Morbid Obesity (BMI >=40]

## 2020-05-20 NOTE — PROGRESS NOTE ADULT - SUBJECTIVE AND OBJECTIVE BOX
OVERNIGHT EVENTS: No acute events overnight.  SUBJECTIVE: Patient seen and examined at the bedside. Denies abdominal discomfort, sob; ostomy output unchanged, nonbloody. 12-point ROS reviewed and is otherwise negative.    Vital Signs Last 12 Hrs  T(F): 97.7 (05-20-20 @ 05:46), Max: 97.7 (05-20-20 @ 05:46)  HR: 66 (05-20-20 @ 05:46) (58 - 66)  BP: 142/78 (05-20-20 @ 05:46) (142/78 - 148/67)  BP(mean): --  RR: 19 (05-20-20 @ 05:46) (19 - 20)  SpO2: 99% (05-20-20 @ 05:46) (99% - 99%)    I&O's Summary    19 May 2020 07:01  -  20 May 2020 07:00  --------------------------------------------------------  IN: 1930 mL / OUT: 3450 mL / NET: -1520 mL    PHYSICAL EXAM:  General: NAD, resting comfortably in bed, AAOx3  HEENT: NCAT, EOMI, MMM  Neck: supple  Respiratory: CTA throughout, normal respiratory rate/depth/effort  Cardiovascular: normal S1/S2, RRR, no MRG  Vascular: 2+ radial/DP pulses b/l  Abdomen: NT, soft, distended, ileostomy output nonbloody; midline wound dressing c/d/i  Extremities: WWP, no edema    LABS:    05-19    127<L>  |  94<L>  |  56<H>  ----------------------------<  205<H>  5.1   |  18<L>  |  1.77<H>    Ca    10.1      19 May 2020 10:20      RADIOLOGY & ADDITIONAL TESTS: Reviewed    MEDICATIONS  (STANDING):  amLODIPine   Tablet 10 milliGRAM(s) Oral daily  AQUAPHOR (petrolatum Ointment) 1 Application(s) Topical two times a day  atorvastatin 10 milliGRAM(s) Oral at bedtime  baclofen 2.5 milliGRAM(s) Oral every 12 hours  brimonidine 0.2% Ophthalmic Solution 1 Drop(s) Both EYES three times a day  chlorhexidine 2% Cloths 1 Application(s) Topical daily  dextrose 5%. 1000 milliLiter(s) (50 mL/Hr) IV Continuous <Continuous>  dextrose 50% Injectable 12.5 Gram(s) IV Push once  dextrose 50% Injectable 25 Gram(s) IV Push once  dextrose 50% Injectable 25 Gram(s) IV Push once  diphenoxylate/atropine 1 Tablet(s) Oral daily  dorzolamide 2% Ophthalmic Solution 1 Drop(s) Both EYES three times a day  furosemide    Tablet 40 milliGRAM(s) Oral every 24 hours  gabapentin 300 milliGRAM(s) Oral every 8 hours  heparin   Injectable 7500 Unit(s) SubCutaneous every 8 hours  insulin glargine Injectable (LANTUS) 14 Unit(s) SubCutaneous at bedtime  insulin lispro (HumaLOG) corrective regimen sliding scale   SubCutaneous Before meals and at bedtime  insulin lispro Injectable (HumaLOG) 8 Unit(s) SubCutaneous three times a day before meals  metFORMIN 500 milliGRAM(s) Oral every 12 hours  pantoprazole    Tablet 40 milliGRAM(s) Oral every 12 hours  rifAXIMin 550 milliGRAM(s) Oral two times a day  sodium bicarbonate 650 milliGRAM(s) Oral three times a day  spironolactone 100 milliGRAM(s) Oral daily  traZODone 100 milliGRAM(s) Oral at bedtime    MEDICATIONS  (PRN):  acetaminophen   Tablet .. 500 milliGRAM(s) Oral every 6 hours PRN Mild Pain (1 - 3), Moderate Pain (4 - 6)  dextrose 40% Gel 15 Gram(s) Oral once PRN Blood Glucose LESS THAN 70 milliGRAM(s)/deciliter  glucagon  Injectable 1 milliGRAM(s) IntraMuscular once PRN Glucose LESS THAN 70 milligrams/deciliter  ondansetron    Tablet 4 milliGRAM(s) Oral every 8 hours PRN Nausea and/or Vomiting

## 2020-05-20 NOTE — PROGRESS NOTE ADULT - PROBLEM SELECTOR PLAN 4
nonoliguric MEG likely 2/2 pre-renal + intrinsic 2/2 COVID-19 + recent initiation/increase of Lasix  -BMP 5/21  -avoid nephrotoxic agents; discontinue vs decrease Lasix dosage  -gentle IV hydration if high GI output  -monitor I/Os   -renal following - appreciate recs    #Non-anion gap metabolic acidosis   -sodium bicarbonate TID  -f/u further nephrology recs nonoliguric MEG likely 2/2 pre-renal + intrinsic 2/2 COVID-19 + recent initiation/increase of Lasix  -BMP 5/21  -avoid nephrotoxic agents  -consider gentle IV hydration if GI output increases  -monitor I/Os   -renal following - appreciate recs    #Non-anion gap metabolic acidosis   -sodium bicarbonate TID  -f/u further nephrology recs

## 2020-05-20 NOTE — PROGRESS NOTE ADULT - PROBLEM SELECTOR PLAN 6
home regimen: gabapentin 1200mg TID (confirmed with pharmacy), which is well over daily limit for gabapentin use  -GFR improved 5/5, increased does of gabapentin from 200 mg TID to 300 mg TID, stable renal function, continue to monitor and adjust home regimen: gabapentin 1200mg TID (confirmed with pharmacy), which is well over daily limit for gabapentin use  -gabapentin 300 mg TID; monitor renal function as above

## 2020-05-20 NOTE — PROGRESS NOTE ADULT - ATTENDING COMMENTS
persistent hyponatremia, azotemia, metabolic acidosis  K remains stable at 5.1  okay to c/w aldactone.  may need potassium lowering agent  Na down again to 126- review of chart reminds us that he was on NaCl tabs at home- restart now  limit free water  please send U osm and Deloris   if serum Na dips lower with low Deloris add normal saline- may need hypertonic saline- will follow up  okay to diuretics for cirrhosis  CO2  up to 21- okay to c/w bicarbonate 1 tab TID

## 2020-05-20 NOTE — PROGRESS NOTE ADULT - PROBLEM SELECTOR PLAN 5
normocytic anemia likely 2/2 chronic disease + kidney dysfunction vs less likely acute blood loss  -Hgb downtrended to 6.6 on 5/5, s/p unit PRBC on 5/5, H/H now stable at 8.3/25.7  -monitor hb, transfuse <7  -s/p 7U pRBCs total this admission as of 5/5/20  -pantoprazole 40mg BID   -monitor for signs of bleeding   -GI no longer following, states no longer any need for capsule endoscopy - patient also refused normocytic anemia likely 2/2 chronic disease + kidney dysfunction vs less likely acute blood loss  -Hgb downtrended to 6.6 on 5/5, s/p unit PRBC on 5/5, H/H now stable at 8.3/25.7  -s/p 7U pRBCs total this admission as of 5/5/20  -initial concern for GI source, however patient refused further workup and GI states capsule endoscopy no longer indicated; ostomy output has remained non-bloody  -hemodynamically stable; low concern for active bleed  -monitor hb, transfuse <7  -pantoprazole 40mg BID normocytic anemia likely 2/2 chronic disease + kidney dysfunction vs less likely acute blood loss  -Hgb downtrended to 6.6 on 5/5, s/p unit PRBC on 5/5, H/H now stable at 8.3/25.7  -s/p 7U pRBCs total this admission as of 5/5/20  -initial concern for GI source, however patient refused further workup and GI states capsule endoscopy no longer indicated; ostomy output has remained non-bloody  -hemodynamically stable; low concern for active bleed  -monitor hb, transfuse <7  -pantoprazole 40mg BID    #Hyponatremia in setting of cirrhosis  -salt tabs 1g tid

## 2020-05-20 NOTE — PROGRESS NOTE ADULT - SUBJECTIVE AND OBJECTIVE BOX
INTERVAL HPI/OVERNIGHT EVENTS:      Patient is a 60y old  Male who presents with a chief complaint of toxic megacolon, COVID + (06 May 2020 10:01)  Spoke to the primary team taking care of the patient. Attempted to call pt, but no answer.  No acute events.   Able to tolerate metformin 500 BID- No GI disturbances  Saturating well on RA. appetite is good.   TSH was 2.389 and Free T4 0.84    FSG & Insulin received:  Yesterday:  pre-dinner fs , 6 units lispro + 2  bedtime fs, 16  lantus   units + 2 units lispro SS  Today:  pre-breakfast fs, 8 nutritional lispro   units + 2 units lispro SS  pre-lunch fsg:     Pt reports the following symptoms:  CONSTITUTIONAL:  Negative fever or chills, feels well, good appetite  CARDIOVASCULAR:  Negative for chest pain or palpitations  RESPIRATORY:  Negative for cough, wheezing, or SOB   GASTROINTESTINAL:  Negative for nausea, vomiting, diarrhea, constipation, or abdominal pain  NEUROLOGIC:  No headache, confusion, dizziness, lightheadedness    MEDICATIONS  (STANDING):  amLODIPine   Tablet 10 milliGRAM(s) Oral daily  AQUAPHOR (petrolatum Ointment) 1 Application(s) Topical two times a day  atorvastatin 10 milliGRAM(s) Oral at bedtime  baclofen 2.5 milliGRAM(s) Oral every 12 hours  brimonidine 0.2% Ophthalmic Solution 1 Drop(s) Both EYES three times a day  chlorhexidine 2% Cloths 1 Application(s) Topical daily  dextrose 5%. 1000 milliLiter(s) (50 mL/Hr) IV Continuous <Continuous>  dextrose 50% Injectable 12.5 Gram(s) IV Push once  dextrose 50% Injectable 25 Gram(s) IV Push once  dextrose 50% Injectable 25 Gram(s) IV Push once  diphenoxylate/atropine 1 Tablet(s) Oral daily  dorzolamide 2% Ophthalmic Solution 1 Drop(s) Both EYES three times a day  furosemide    Tablet 40 milliGRAM(s) Oral every 24 hours  gabapentin 300 milliGRAM(s) Oral every 8 hours  heparin   Injectable 7500 Unit(s) SubCutaneous every 8 hours  insulin glargine Injectable (LANTUS) 14 Unit(s) SubCutaneous at bedtime  insulin lispro (HumaLOG) corrective regimen sliding scale   SubCutaneous Before meals and at bedtime  insulin lispro Injectable (HumaLOG) 8 Unit(s) SubCutaneous three times a day before meals  metFORMIN 500 milliGRAM(s) Oral every 12 hours  pantoprazole    Tablet 40 milliGRAM(s) Oral every 12 hours  rifAXIMin 550 milliGRAM(s) Oral two times a day  sodium bicarbonate 650 milliGRAM(s) Oral three times a day  spironolactone 100 milliGRAM(s) Oral daily  traZODone 100 milliGRAM(s) Oral at bedtime    MEDICATIONS  (PRN):  acetaminophen   Tablet .. 500 milliGRAM(s) Oral every 6 hours PRN Mild Pain (1 - 3), Moderate Pain (4 - 6)  dextrose 40% Gel 15 Gram(s) Oral once PRN Blood Glucose LESS THAN 70 milliGRAM(s)/deciliter  glucagon  Injectable 1 milliGRAM(s) IntraMuscular once PRN Glucose LESS THAN 70 milligrams/deciliter  ondansetron    Tablet 4 milliGRAM(s) Oral every 8 hours PRN Nausea and/or Vomiting      PHYSICAL EXAM  Vital Signs Last 24 Hrs  T(C): 36.5 (20 May 2020 05:46), Max: 36.8 (19 May 2020 13:40)  T(F): 97.7 (20 May 2020 05:46), Max: 98.2 (19 May 2020 13:40)  HR: 66 (20 May 2020 05:46) (58 - 66)  BP: 142/78 (20 May 2020 05:46) (119/76 - 148/67)  BP(mean): --  RR: 19 (20 May 2020 05:46) (18 - 20)  SpO2: 99% (20 May 2020 05:46) (99% - 100%)    Due to the nature of this patient’s COVID-19 isolation status (either confirmed or suspected), this note was prepared without a bedside physical examination to prevent spread of infection and to conserve personal protective equipment during this nationwide pandemic. If possible, direct patient communication occurred via electronic measures or telephone conversation. Examination highlights were provided by a bedside nurse wearing personal protective equipment and review of pertinent medical records. Objective data (vital signs, urine output, lab results, imaging studies, medications, etc) were reviewed in detail. Face to face visits and physical examination have been limited only to patients for whom it is required for medical decision making.  LABS:        127<L>  |  94<L>  |  56<H>  ----------------------------<  205<H>  5.1   |  18<L>  |  1.77<H>    Ca    10.1      19 May 2020 10:20          Thyroid Stimulating Hormone, Serum: 2.389 uIU/mL ( @ 07:38)  Thyroid Stimulating Hormone, Serum: 3.869 uIU/mL ( @ 06:50)  Thyroid Stimulating Hormone, Serum: 3.104 uIU/mL ( @ 06:10)  Thyroid Stimulating Hormone, Serum: 2.712 uIU/mL ( @ 06:31)  Thyroid Stimulating Hormone, Serum: 3.063 uIU/mL ( @ 06:31)  Thyroid Stimulating Hormone, Serum: 3.509 uIU/mL ( @ 06:07)  Thyroid Stimulating Hormone, Serum: 3.325 uIU/mL ( @ 06:17)  Thyroid Stimulating Hormone, Serum: 2.813 uIU/mL ( @ 07:28)  Thyroid Stimulating Hormone, Serum: 2.877 uIU/mL ( @ 00:34)      HbA1C: 6.9 % ( @ 06:59)  7.6 % ( @ 05:58)    CAPILLARY BLOOD GLUCOSE      POCT Blood Glucose.: 186 mg/dL (20 May 2020 07:53)  POCT Blood Glucose.: 162 mg/dL (19 May 2020 21:47)  POCT Blood Glucose.: 161 mg/dL (19 May 2020 16:57)  POCT Blood Glucose.: 213 mg/dL (19 May 2020 11:53)    Will discuss in rounds  A/P 60yMale with hx of DM now with COVID, worsening renal function, moderate hyperglycemia.     1.  DM: type 2  Please continue Lantus units at bedtime  PLease increase Lispro to  units tid with meals.   Continue lispro moderate dose scale with meals and at bedtime.   Please increase to metformin 1000 mg BID - with breakfast and dinner   Continue consistent carb diet  FSG Goal 100-180  GFR 40 and EF 65%    2.  Hyperlipidemia - goal LDL < 70  - on lipitor 10 mg once daily    3.  Obesity - outpatient medical management.      4.  Hypothyroidism   - TSH on admission 2.8.   - levothyroxine 50mcg discontinued on  to assess necessity as there is no evidence of autoimmune hypothyroidism at this time.   - NO levothyroxine at this time.   - Repeat TSH 3.32 and free T4 1.03 ().   - TSH 3.5 and Free T4 1.01 on 5/3   - TSh was 2.712 and free T4 was 0.98 on   TSh was 3.104 and free T4 was 0.86 on   TSh was 2.389 and Free T4 0.84 on     For discharge, metformin 1000mg BID and glimepiride 1mg before breakfast.    Case seen and discussed with  and updated the primary team.  Pt can follow up at discharge with [facility] by calling  to make an appointment. INTERVAL HPI/OVERNIGHT EVENTS:      Patient is a 60y old  Male who presents with a chief complaint of toxic megacolon, COVID + (06 May 2020 10:01)  Spoke to the primary team taking care of the patient. Attempted to call pt, but no answer.  No acute events.   Able to tolerate metformin 500 BID- No GI disturbances  Saturating well on RA. appetite is good.   TSH was 2.389 and Free T4 0.84    FSG & Insulin received:  Yesterday:  pre-dinner fs , 6 units lispro + 2. Couldn't remember what he ate.  bedtime fs, 16  lantus   units + 2 units lispro SS  Today:  pre-breakfast fs, 8 nutritional lispro   units + 2 units lispro SS. Ate egg, sausage, 1 slice bread, banana  pre-lunch fsg:     Pt reports the following symptoms:  CONSTITUTIONAL:  Negative fever or chills, feels well, good appetite  CARDIOVASCULAR:  Negative for chest pain or palpitations  RESPIRATORY:  Negative for cough, wheezing, or SOB   GASTROINTESTINAL:  Negative for nausea, vomiting, diarrhea, constipation, or abdominal pain  NEUROLOGIC:  No headache, confusion, dizziness, lightheadedness    MEDICATIONS  (STANDING):  amLODIPine   Tablet 10 milliGRAM(s) Oral daily  AQUAPHOR (petrolatum Ointment) 1 Application(s) Topical two times a day  atorvastatin 10 milliGRAM(s) Oral at bedtime  baclofen 2.5 milliGRAM(s) Oral every 12 hours  brimonidine 0.2% Ophthalmic Solution 1 Drop(s) Both EYES three times a day  chlorhexidine 2% Cloths 1 Application(s) Topical daily  dextrose 5%. 1000 milliLiter(s) (50 mL/Hr) IV Continuous <Continuous>  dextrose 50% Injectable 12.5 Gram(s) IV Push once  dextrose 50% Injectable 25 Gram(s) IV Push once  dextrose 50% Injectable 25 Gram(s) IV Push once  diphenoxylate/atropine 1 Tablet(s) Oral daily  dorzolamide 2% Ophthalmic Solution 1 Drop(s) Both EYES three times a day  furosemide    Tablet 40 milliGRAM(s) Oral every 24 hours  gabapentin 300 milliGRAM(s) Oral every 8 hours  heparin   Injectable 7500 Unit(s) SubCutaneous every 8 hours  insulin glargine Injectable (LANTUS) 14 Unit(s) SubCutaneous at bedtime  insulin lispro (HumaLOG) corrective regimen sliding scale   SubCutaneous Before meals and at bedtime  insulin lispro Injectable (HumaLOG) 8 Unit(s) SubCutaneous three times a day before meals  metFORMIN 500 milliGRAM(s) Oral every 12 hours  pantoprazole    Tablet 40 milliGRAM(s) Oral every 12 hours  rifAXIMin 550 milliGRAM(s) Oral two times a day  sodium bicarbonate 650 milliGRAM(s) Oral three times a day  spironolactone 100 milliGRAM(s) Oral daily  traZODone 100 milliGRAM(s) Oral at bedtime    MEDICATIONS  (PRN):  acetaminophen   Tablet .. 500 milliGRAM(s) Oral every 6 hours PRN Mild Pain (1 - 3), Moderate Pain (4 - 6)  dextrose 40% Gel 15 Gram(s) Oral once PRN Blood Glucose LESS THAN 70 milliGRAM(s)/deciliter  glucagon  Injectable 1 milliGRAM(s) IntraMuscular once PRN Glucose LESS THAN 70 milligrams/deciliter  ondansetron    Tablet 4 milliGRAM(s) Oral every 8 hours PRN Nausea and/or Vomiting      PHYSICAL EXAM  Vital Signs Last 24 Hrs  T(C): 36.5 (20 May 2020 05:46), Max: 36.8 (19 May 2020 13:40)  T(F): 97.7 (20 May 2020 05:46), Max: 98.2 (19 May 2020 13:40)  HR: 66 (20 May 2020 05:46) (58 - 66)  BP: 142/78 (20 May 2020 05:46) (119/76 - 148/67)  BP(mean): --  RR: 19 (20 May 2020 05:46) (18 - 20)  SpO2: 99% (20 May 2020 05:46) (99% - 100%)    Due to the nature of this patient’s COVID-19 isolation status (either confirmed or suspected), this note was prepared without a bedside physical examination to prevent spread of infection and to conserve personal protective equipment during this nationwide pandemic. If possible, direct patient communication occurred via electronic measures or telephone conversation. Examination highlights were provided by a bedside nurse wearing personal protective equipment and review of pertinent medical records. Objective data (vital signs, urine output, lab results, imaging studies, medications, etc) were reviewed in detail. Face to face visits and physical examination have been limited only to patients for whom it is required for medical decision making.  LABS:        127<L>  |  94<L>  |  56<H>  ----------------------------<  205<H>  5.1   |  18<L>  |  1.77<H>    Ca    10.1      19 May 2020 10:20          Thyroid Stimulating Hormone, Serum: 2.389 uIU/mL ( @ 07:38)  Thyroid Stimulating Hormone, Serum: 3.869 uIU/mL ( @ 06:50)  Thyroid Stimulating Hormone, Serum: 3.104 uIU/mL ( @ 06:10)  Thyroid Stimulating Hormone, Serum: 2.712 uIU/mL ( @ 06:31)  Thyroid Stimulating Hormone, Serum: 3.063 uIU/mL ( @ 06:31)  Thyroid Stimulating Hormone, Serum: 3.509 uIU/mL ( @ 06:07)  Thyroid Stimulating Hormone, Serum: 3.325 uIU/mL ( @ 06:17)  Thyroid Stimulating Hormone, Serum: 2.813 uIU/mL ( @ 07:28)  Thyroid Stimulating Hormone, Serum: 2.877 uIU/mL ( @ 00:34)      HbA1C: 6.9 % ( @ 06:59)  7.6 % ( @ 05:58)    CAPILLARY BLOOD GLUCOSE      POCT Blood Glucose.: 186 mg/dL (20 May 2020 07:53)  POCT Blood Glucose.: 162 mg/dL (19 May 2020 21:47)  POCT Blood Glucose.: 161 mg/dL (19 May 2020 16:57)  POCT Blood Glucose.: 213 mg/dL (19 May 2020 11:53)    Will discuss in rounds  A/P 60yMale with hx of DM now with COVID, worsening renal function, moderate hyperglycemia.     1.  DM: type 2  Please continue Lantus units at bedtime  PLease increase Lispro to  units tid with meals.   Continue lispro moderate dose scale with meals and at bedtime.   Please increase to metformin 1000 mg BID - with breakfast and dinner   Continue consistent carb diet  FSG Goal 100-180  GFR 40 and EF 65%    2.  Hyperlipidemia - goal LDL < 70  - on lipitor 10 mg once daily    3.  Obesity - outpatient medical management.      4.  Hypothyroidism   - TSH on admission 2.8.   - levothyroxine 50mcg discontinued on  to assess necessity as there is no evidence of autoimmune hypothyroidism at this time.   - NO levothyroxine at this time.   - Repeat TSH 3.32 and free T4 1.03 ().   - TSH 3.5 and Free T4 1.01 on 5/3   - TSh was 2.712 and free T4 was 0.98 on   TSh was 3.104 and free T4 was 0.86 on   TSh was 2.389 and Free T4 0.84 on     For discharge, metformin 1000mg BID and glimepiride 1mg before breakfast.    Case seen and discussed with  and updated the primary team.  Pt can follow up at discharge with [facility] by calling  to make an appointment. INTERVAL HPI/OVERNIGHT EVENTS:      Patient is a 60y old  Male who presents with a chief complaint of toxic megacolon, COVID + (06 May 2020 10:01)  Spoke to the primary team taking care of the patient. SPoke to pt via phone  No acute events.   Able to tolerate metformin 500 BID- No GI disturbances  Saturating well on RA. appetite is good.   TSH was 2.389 and Free T4 0.84    FSG & Insulin received:  Yesterday:  pre-dinner fs , 6 units lispro + 2. Couldn't remember what he ate.  bedtime fs, 16  lantus   units + 2 units lispro SS  Today:  pre-breakfast fs, 8 nutritional lispro   units + 2 units lispro SS. Ate egg, sausage, 1 slice bread, banana  pre-lunch fsg:     Pt reports the following symptoms:  CONSTITUTIONAL:  Negative fever or chills, feels well, good appetite  CARDIOVASCULAR:  Negative for chest pain or palpitations  RESPIRATORY:  Negative for cough, wheezing, or SOB   GASTROINTESTINAL:  Negative for nausea, vomiting, diarrhea, constipation, or abdominal pain  NEUROLOGIC:  No headache, confusion, dizziness, lightheadedness    MEDICATIONS  (STANDING):  amLODIPine   Tablet 10 milliGRAM(s) Oral daily  AQUAPHOR (petrolatum Ointment) 1 Application(s) Topical two times a day  atorvastatin 10 milliGRAM(s) Oral at bedtime  baclofen 2.5 milliGRAM(s) Oral every 12 hours  brimonidine 0.2% Ophthalmic Solution 1 Drop(s) Both EYES three times a day  chlorhexidine 2% Cloths 1 Application(s) Topical daily  dextrose 5%. 1000 milliLiter(s) (50 mL/Hr) IV Continuous <Continuous>  dextrose 50% Injectable 12.5 Gram(s) IV Push once  dextrose 50% Injectable 25 Gram(s) IV Push once  dextrose 50% Injectable 25 Gram(s) IV Push once  diphenoxylate/atropine 1 Tablet(s) Oral daily  dorzolamide 2% Ophthalmic Solution 1 Drop(s) Both EYES three times a day  furosemide    Tablet 40 milliGRAM(s) Oral every 24 hours  gabapentin 300 milliGRAM(s) Oral every 8 hours  heparin   Injectable 7500 Unit(s) SubCutaneous every 8 hours  insulin glargine Injectable (LANTUS) 14 Unit(s) SubCutaneous at bedtime  insulin lispro (HumaLOG) corrective regimen sliding scale   SubCutaneous Before meals and at bedtime  insulin lispro Injectable (HumaLOG) 8 Unit(s) SubCutaneous three times a day before meals  metFORMIN 500 milliGRAM(s) Oral every 12 hours  pantoprazole    Tablet 40 milliGRAM(s) Oral every 12 hours  rifAXIMin 550 milliGRAM(s) Oral two times a day  sodium bicarbonate 650 milliGRAM(s) Oral three times a day  spironolactone 100 milliGRAM(s) Oral daily  traZODone 100 milliGRAM(s) Oral at bedtime    MEDICATIONS  (PRN):  acetaminophen   Tablet .. 500 milliGRAM(s) Oral every 6 hours PRN Mild Pain (1 - 3), Moderate Pain (4 - 6)  dextrose 40% Gel 15 Gram(s) Oral once PRN Blood Glucose LESS THAN 70 milliGRAM(s)/deciliter  glucagon  Injectable 1 milliGRAM(s) IntraMuscular once PRN Glucose LESS THAN 70 milligrams/deciliter  ondansetron    Tablet 4 milliGRAM(s) Oral every 8 hours PRN Nausea and/or Vomiting      PHYSICAL EXAM  Vital Signs Last 24 Hrs  T(C): 36.5 (20 May 2020 05:46), Max: 36.8 (19 May 2020 13:40)  T(F): 97.7 (20 May 2020 05:46), Max: 98.2 (19 May 2020 13:40)  HR: 66 (20 May 2020 05:46) (58 - 66)  BP: 142/78 (20 May 2020 05:46) (119/76 - 148/67)  BP(mean): --  RR: 19 (20 May 2020 05:46) (18 - 20)  SpO2: 99% (20 May 2020 05:46) (99% - 100%)    Due to the nature of this patient’s COVID-19 isolation status (either confirmed or suspected), this note was prepared without a bedside physical examination to prevent spread of infection and to conserve personal protective equipment during this nationwide pandemic. If possible, direct patient communication occurred via electronic measures or telephone conversation. Examination highlights were provided by a bedside nurse wearing personal protective equipment and review of pertinent medical records. Objective data (vital signs, urine output, lab results, imaging studies, medications, etc) were reviewed in detail. Face to face visits and physical examination have been limited only to patients for whom it is required for medical decision making.  LABS:        127<L>  |  94<L>  |  56<H>  ----------------------------<  205<H>  5.1   |  18<L>  |  1.77<H>    Ca    10.1      19 May 2020 10:20          Thyroid Stimulating Hormone, Serum: 2.389 uIU/mL ( @ 07:38)  Thyroid Stimulating Hormone, Serum: 3.869 uIU/mL ( @ 06:50)  Thyroid Stimulating Hormone, Serum: 3.104 uIU/mL ( @ 06:10)  Thyroid Stimulating Hormone, Serum: 2.712 uIU/mL ( @ 06:31)  Thyroid Stimulating Hormone, Serum: 3.063 uIU/mL ( @ 06:31)  Thyroid Stimulating Hormone, Serum: 3.509 uIU/mL ( @ 06:07)  Thyroid Stimulating Hormone, Serum: 3.325 uIU/mL ( @ 06:17)  Thyroid Stimulating Hormone, Serum: 2.813 uIU/mL ( @ 07:28)  Thyroid Stimulating Hormone, Serum: 2.877 uIU/mL ( @ 00:34)      HbA1C: 6.9 % ( @ 06:59)  7.6 % ( @ 05:58)    CAPILLARY BLOOD GLUCOSE      POCT Blood Glucose.: 186 mg/dL (20 May 2020 07:53)  POCT Blood Glucose.: 162 mg/dL (19 May 2020 21:47)  POCT Blood Glucose.: 161 mg/dL (19 May 2020 16:57)  POCT Blood Glucose.: 213 mg/dL (19 May 2020 11:53)    Will discuss in rounds  A/P 60yMale with hx of DM now with COVID, worsening renal function, moderate hyperglycemia.     1.  DM: type 2  Please continue Lantus units at bedtime  PLease increase Lispro to  units tid with meals.   Continue lispro moderate dose scale with meals and at bedtime.   Please increase to metformin 1000 mg BID - with breakfast and dinner   Continue consistent carb diet  FSG Goal 100-180  GFR 40 and EF 65%    2.  Hyperlipidemia - goal LDL < 70  - on lipitor 10 mg once daily    3.  Obesity - outpatient medical management.      4.  Hypothyroidism   - TSH on admission 2.8.   - levothyroxine 50mcg discontinued on  to assess necessity as there is no evidence of autoimmune hypothyroidism at this time.   - NO levothyroxine at this time.   - Repeat TSH 3.32 and free T4 1.03 ().   - TSH 3.5 and Free T4 1.01 on 5/3   - TSh was 2.712 and free T4 was 0.98 on   TSh was 3.104 and free T4 was 0.86 on   TSh was 2.389 and Free T4 0.84 on     For discharge, metformin 1000mg BID and glimepiride 1mg before breakfast.    Case seen and discussed with  and updated the primary team.  Pt can follow up at discharge with [facility] by calling  to make an appointment. INTERVAL HPI/OVERNIGHT EVENTS:      Patient is a 60y old  Male who presents with a chief complaint of toxic megacolon, COVID + (06 May 2020 10:01)  Spoke to the primary team taking care of the patient. SPoke to pt via phone  No acute events.   Able to tolerate metformin 500 BID- No GI disturbances  Saturating well on RA. appetite is good.   TSH was 2.389 and Free T4 0.84    FSG & Insulin received:  Yesterday:  pre-dinner fs , 6 units lispro + 2. Couldn't remember what he ate.  bedtime fs, 16  lantus   units + 2 units lispro SS  Today:  pre-breakfast fs, 8 nutritional lispro   units + 2 units lispro SS. Ate egg, sausage, 1 slice bread, banana  pre-lunch fsg:     Pt reports the following symptoms:  CONSTITUTIONAL:  Negative fever or chills, feels well, good appetite  CARDIOVASCULAR:  Negative for chest pain or palpitations  RESPIRATORY:  Negative for cough, wheezing, or SOB   GASTROINTESTINAL:  Negative for nausea, vomiting, diarrhea, constipation, or abdominal pain  NEUROLOGIC:  No headache, confusion, dizziness, lightheadedness    MEDICATIONS  (STANDING):  amLODIPine   Tablet 10 milliGRAM(s) Oral daily  AQUAPHOR (petrolatum Ointment) 1 Application(s) Topical two times a day  atorvastatin 10 milliGRAM(s) Oral at bedtime  baclofen 2.5 milliGRAM(s) Oral every 12 hours  brimonidine 0.2% Ophthalmic Solution 1 Drop(s) Both EYES three times a day  chlorhexidine 2% Cloths 1 Application(s) Topical daily  dextrose 5%. 1000 milliLiter(s) (50 mL/Hr) IV Continuous <Continuous>  dextrose 50% Injectable 12.5 Gram(s) IV Push once  dextrose 50% Injectable 25 Gram(s) IV Push once  dextrose 50% Injectable 25 Gram(s) IV Push once  diphenoxylate/atropine 1 Tablet(s) Oral daily  dorzolamide 2% Ophthalmic Solution 1 Drop(s) Both EYES three times a day  furosemide    Tablet 40 milliGRAM(s) Oral every 24 hours  gabapentin 300 milliGRAM(s) Oral every 8 hours  heparin   Injectable 7500 Unit(s) SubCutaneous every 8 hours  insulin glargine Injectable (LANTUS) 14 Unit(s) SubCutaneous at bedtime  insulin lispro (HumaLOG) corrective regimen sliding scale   SubCutaneous Before meals and at bedtime  insulin lispro Injectable (HumaLOG) 8 Unit(s) SubCutaneous three times a day before meals  metFORMIN 500 milliGRAM(s) Oral every 12 hours  pantoprazole    Tablet 40 milliGRAM(s) Oral every 12 hours  rifAXIMin 550 milliGRAM(s) Oral two times a day  sodium bicarbonate 650 milliGRAM(s) Oral three times a day  spironolactone 100 milliGRAM(s) Oral daily  traZODone 100 milliGRAM(s) Oral at bedtime    MEDICATIONS  (PRN):  acetaminophen   Tablet .. 500 milliGRAM(s) Oral every 6 hours PRN Mild Pain (1 - 3), Moderate Pain (4 - 6)  dextrose 40% Gel 15 Gram(s) Oral once PRN Blood Glucose LESS THAN 70 milliGRAM(s)/deciliter  glucagon  Injectable 1 milliGRAM(s) IntraMuscular once PRN Glucose LESS THAN 70 milligrams/deciliter  ondansetron    Tablet 4 milliGRAM(s) Oral every 8 hours PRN Nausea and/or Vomiting      PHYSICAL EXAM  Vital Signs Last 24 Hrs  T(C): 36.5 (20 May 2020 05:46), Max: 36.8 (19 May 2020 13:40)  T(F): 97.7 (20 May 2020 05:46), Max: 98.2 (19 May 2020 13:40)  HR: 66 (20 May 2020 05:46) (58 - 66)  BP: 142/78 (20 May 2020 05:46) (119/76 - 148/67)  BP(mean): --  RR: 19 (20 May 2020 05:46) (18 - 20)  SpO2: 99% (20 May 2020 05:46) (99% - 100%)    Due to the nature of this patient’s COVID-19 isolation status (either confirmed or suspected), this note was prepared without a bedside physical examination to prevent spread of infection and to conserve personal protective equipment during this nationwide pandemic. If possible, direct patient communication occurred via electronic measures or telephone conversation. Examination highlights were provided by a bedside nurse wearing personal protective equipment and review of pertinent medical records. Objective data (vital signs, urine output, lab results, imaging studies, medications, etc) were reviewed in detail. Face to face visits and physical examination have been limited only to patients for whom it is required for medical decision making.  LABS:        127<L>  |  94<L>  |  56<H>  ----------------------------<  205<H>  5.1   |  18<L>  |  1.77<H>    Ca    10.1      19 May 2020 10:20          Thyroid Stimulating Hormone, Serum: 2.389 uIU/mL ( @ 07:38)  Thyroid Stimulating Hormone, Serum: 3.869 uIU/mL ( @ 06:50)  Thyroid Stimulating Hormone, Serum: 3.104 uIU/mL ( @ 06:10)  Thyroid Stimulating Hormone, Serum: 2.712 uIU/mL ( @ 06:31)  Thyroid Stimulating Hormone, Serum: 3.063 uIU/mL ( @ 06:31)  Thyroid Stimulating Hormone, Serum: 3.509 uIU/mL ( @ 06:07)  Thyroid Stimulating Hormone, Serum: 3.325 uIU/mL ( @ 06:17)  Thyroid Stimulating Hormone, Serum: 2.813 uIU/mL ( @ 07:28)  Thyroid Stimulating Hormone, Serum: 2.877 uIU/mL ( @ 00:34)      HbA1C: 6.9 % ( @ 06:59)  7.6 % ( @ 05:58)    CAPILLARY BLOOD GLUCOSE      POCT Blood Glucose.: 186 mg/dL (20 May 2020 07:53)  POCT Blood Glucose.: 162 mg/dL (19 May 2020 21:47)  POCT Blood Glucose.: 161 mg/dL (19 May 2020 16:57)  POCT Blood Glucose.: 213 mg/dL (19 May 2020 11:53)      A/P 60yMale with hx of DM now with COVID, worsening renal function, moderate hyperglycemia.     1.  DM: type 2  Please continue Lantus units at bedtime  PLease increase Lispro to  units tid with meals.   Continue lispro moderate dose scale with meals and at bedtime.   Please increase to metformin 1000 mg BID - with breakfast and dinner   Continue consistent carb diet  FSG Goal 100-180  GFR 40 and EF 65%    2.  Hyperlipidemia - goal LDL < 70  - on lipitor 10 mg once daily    3.  Obesity - outpatient medical management.      4.  Hypothyroidism   - TSH on admission 2.8.   - levothyroxine 50mcg discontinued on  to assess necessity as there is no evidence of autoimmune hypothyroidism at this time.   - NO levothyroxine at this time.   - Repeat TSH 3.32 and free T4 1.03 ().   - TSH 3.5 and Free T4 1.01 on 5/3   - TSh was 2.712 and free T4 was 0.98 on   TSh was 3.104 and free T4 was 0.86 on   TSh was 2.389 and Free T4 0.84 on     For discharge, metformin 1000mg BID and glimepiride 1mg before breakfast.    Case seen and discussed with  and updated the primary team.  Pt can follow up at discharge with [facility] by calling  to make an appointment. INTERVAL HPI/OVERNIGHT EVENTS:      Patient is a 60y old  Male who presents with a chief complaint of toxic megacolon, COVID + (06 May 2020 10:01)  Spoke to the primary team taking care of the patient. SPoke to pt via phone  No acute events.   Able to tolerate metformin 500 BID- No GI disturbances  Saturating well on RA. appetite is good.   TSH was 2.389 and Free T4 0.84    FSG & Insulin received:  Yesterday:  pre-dinner fs , 6 units lispro + 2. Couldn't remember what he ate.  bedtime fs, 16  lantus   units + 2 units lispro SS  Today:  pre-breakfast fs, 8 nutritional lispro   units + 2 units lispro SS. Ate egg, sausage, 1 slice bread, banana  pre-lunch fsg:     Pt reports the following symptoms:  CONSTITUTIONAL:  Negative fever or chills, feels well, good appetite  CARDIOVASCULAR:  Negative for chest pain or palpitations  RESPIRATORY:  Negative for cough, wheezing, or SOB   GASTROINTESTINAL:  Negative for nausea, vomiting, diarrhea, constipation, or abdominal pain  NEUROLOGIC:  No headache, confusion, dizziness, lightheadedness    MEDICATIONS  (STANDING):  amLODIPine   Tablet 10 milliGRAM(s) Oral daily  AQUAPHOR (petrolatum Ointment) 1 Application(s) Topical two times a day  atorvastatin 10 milliGRAM(s) Oral at bedtime  baclofen 2.5 milliGRAM(s) Oral every 12 hours  brimonidine 0.2% Ophthalmic Solution 1 Drop(s) Both EYES three times a day  chlorhexidine 2% Cloths 1 Application(s) Topical daily  dextrose 5%. 1000 milliLiter(s) (50 mL/Hr) IV Continuous <Continuous>  dextrose 50% Injectable 12.5 Gram(s) IV Push once  dextrose 50% Injectable 25 Gram(s) IV Push once  dextrose 50% Injectable 25 Gram(s) IV Push once  diphenoxylate/atropine 1 Tablet(s) Oral daily  dorzolamide 2% Ophthalmic Solution 1 Drop(s) Both EYES three times a day  furosemide    Tablet 40 milliGRAM(s) Oral every 24 hours  gabapentin 300 milliGRAM(s) Oral every 8 hours  heparin   Injectable 7500 Unit(s) SubCutaneous every 8 hours  insulin glargine Injectable (LANTUS) 14 Unit(s) SubCutaneous at bedtime  insulin lispro (HumaLOG) corrective regimen sliding scale   SubCutaneous Before meals and at bedtime  insulin lispro Injectable (HumaLOG) 8 Unit(s) SubCutaneous three times a day before meals  metFORMIN 500 milliGRAM(s) Oral every 12 hours  pantoprazole    Tablet 40 milliGRAM(s) Oral every 12 hours  rifAXIMin 550 milliGRAM(s) Oral two times a day  sodium bicarbonate 650 milliGRAM(s) Oral three times a day  spironolactone 100 milliGRAM(s) Oral daily  traZODone 100 milliGRAM(s) Oral at bedtime    MEDICATIONS  (PRN):  acetaminophen   Tablet .. 500 milliGRAM(s) Oral every 6 hours PRN Mild Pain (1 - 3), Moderate Pain (4 - 6)  dextrose 40% Gel 15 Gram(s) Oral once PRN Blood Glucose LESS THAN 70 milliGRAM(s)/deciliter  glucagon  Injectable 1 milliGRAM(s) IntraMuscular once PRN Glucose LESS THAN 70 milligrams/deciliter  ondansetron    Tablet 4 milliGRAM(s) Oral every 8 hours PRN Nausea and/or Vomiting      PHYSICAL EXAM  Vital Signs Last 24 Hrs  T(C): 36.5 (20 May 2020 05:46), Max: 36.8 (19 May 2020 13:40)  T(F): 97.7 (20 May 2020 05:46), Max: 98.2 (19 May 2020 13:40)  HR: 66 (20 May 2020 05:46) (58 - 66)  BP: 142/78 (20 May 2020 05:46) (119/76 - 148/67)  BP(mean): --  RR: 19 (20 May 2020 05:46) (18 - 20)  SpO2: 99% (20 May 2020 05:46) (99% - 100%)    Due to the nature of this patient’s COVID-19 isolation status (either confirmed or suspected), this note was prepared without a bedside physical examination to prevent spread of infection and to conserve personal protective equipment during this nationwide pandemic. If possible, direct patient communication occurred via electronic measures or telephone conversation. Examination highlights were provided by a bedside nurse wearing personal protective equipment and review of pertinent medical records. Objective data (vital signs, urine output, lab results, imaging studies, medications, etc) were reviewed in detail. Face to face visits and physical examination have been limited only to patients for whom it is required for medical decision making.  LABS:        127<L>  |  94<L>  |  56<H>  ----------------------------<  205<H>  5.1   |  18<L>  |  1.77<H>    Ca    10.1      19 May 2020 10:20          Thyroid Stimulating Hormone, Serum: 2.389 uIU/mL ( @ 07:38)  Thyroid Stimulating Hormone, Serum: 3.869 uIU/mL ( @ 06:50)  Thyroid Stimulating Hormone, Serum: 3.104 uIU/mL ( @ 06:10)  Thyroid Stimulating Hormone, Serum: 2.712 uIU/mL ( @ 06:31)  Thyroid Stimulating Hormone, Serum: 3.063 uIU/mL ( @ 06:31)  Thyroid Stimulating Hormone, Serum: 3.509 uIU/mL ( @ 06:07)  Thyroid Stimulating Hormone, Serum: 3.325 uIU/mL ( @ 06:17)  Thyroid Stimulating Hormone, Serum: 2.813 uIU/mL ( @ 07:28)  Thyroid Stimulating Hormone, Serum: 2.877 uIU/mL ( @ 00:34)      HbA1C: 6.9 % ( @ 06:59)  7.6 % ( @ 05:58)    CAPILLARY BLOOD GLUCOSE      POCT Blood Glucose.: 186 mg/dL (20 May 2020 07:53)  POCT Blood Glucose.: 162 mg/dL (19 May 2020 21:47)  POCT Blood Glucose.: 161 mg/dL (19 May 2020 16:57)  POCT Blood Glucose.: 213 mg/dL (19 May 2020 11:53)      A/P 60yMale with hx of DM now with COVID, worsening renal function, moderate hyperglycemia.     1.  DM: type 2  Please continue Lantus 16 units at bedtime  PLease conitnue Lispro 8  units tid with meals.   Continue lispro moderate dose scale with meals and at bedtime.   Please increase to metformin 1000 mg BID - with breakfast and dinner   Continue consistent carb diet  FSG Goal 100-180  GFR 40 and EF 65%    2.  Hyperlipidemia - goal LDL < 70  - on lipitor 10 mg once daily    3.  Obesity - outpatient medical management.      4.  Hypothyroidism   - TSH on admission 2.8.   - levothyroxine 50mcg discontinued on  to assess necessity as there is no evidence of autoimmune hypothyroidism at this time.   - NO levothyroxine at this time.   - Repeat TSH 3.32 and free T4 1.03 ().   - TSH 3.5 and Free T4 1.01 on 5/3   - TSh was 2.712 and free T4 was 0.98 on   TSh was 3.104 and free T4 was 0.86 on   TSh was 2.389 and Free T4 0.84 on     For discharge, metformin 1000mg BID and glimepiride 1mg before breakfast.    Case seen and discussed with  and updated the primary team.  Pt can follow up at discharge with [facility] by calling  to make an appointment. INTERVAL HPI/OVERNIGHT EVENTS:      Patient is a 60y old  Male who presents with a chief complaint of toxic megacolon, COVID + (06 May 2020 10:01)  Spoke to the primary team taking care of the patient. SPoke to pt via phone  No acute events.   Able to tolerate metformin 500 BID- No GI disturbances  Saturating well on RA. appetite is good.   TSH was 2.389 and Free T4 0.84    FSG & Insulin received:  Yesterday:  pre-dinner fs , 6 units lispro + 2. Couldn't remember what he ate.  bedtime fs, 16  lantus   units + 2 units lispro SS  Today:  pre-breakfast fs, 8 nutritional lispro   units + 2 units lispro SS. Ate egg, sausage, 1 slice bread, banana  pre-lunch fs    Pt reports the following symptoms:  CONSTITUTIONAL:  Negative fever or chills, feels well, good appetite  CARDIOVASCULAR:  Negative for chest pain or palpitations  RESPIRATORY:  Negative for cough, wheezing, or SOB   GASTROINTESTINAL:  Negative for nausea, vomiting, diarrhea, constipation, or abdominal pain  NEUROLOGIC:  No headache, confusion, dizziness, lightheadedness    MEDICATIONS  (STANDING):  amLODIPine   Tablet 10 milliGRAM(s) Oral daily  AQUAPHOR (petrolatum Ointment) 1 Application(s) Topical two times a day  atorvastatin 10 milliGRAM(s) Oral at bedtime  baclofen 2.5 milliGRAM(s) Oral every 12 hours  brimonidine 0.2% Ophthalmic Solution 1 Drop(s) Both EYES three times a day  chlorhexidine 2% Cloths 1 Application(s) Topical daily  dextrose 5%. 1000 milliLiter(s) (50 mL/Hr) IV Continuous <Continuous>  dextrose 50% Injectable 12.5 Gram(s) IV Push once  dextrose 50% Injectable 25 Gram(s) IV Push once  dextrose 50% Injectable 25 Gram(s) IV Push once  diphenoxylate/atropine 1 Tablet(s) Oral daily  dorzolamide 2% Ophthalmic Solution 1 Drop(s) Both EYES three times a day  furosemide    Tablet 40 milliGRAM(s) Oral every 24 hours  gabapentin 300 milliGRAM(s) Oral every 8 hours  heparin   Injectable 7500 Unit(s) SubCutaneous every 8 hours  insulin glargine Injectable (LANTUS) 14 Unit(s) SubCutaneous at bedtime  insulin lispro (HumaLOG) corrective regimen sliding scale   SubCutaneous Before meals and at bedtime  insulin lispro Injectable (HumaLOG) 8 Unit(s) SubCutaneous three times a day before meals  metFORMIN 500 milliGRAM(s) Oral every 12 hours  pantoprazole    Tablet 40 milliGRAM(s) Oral every 12 hours  rifAXIMin 550 milliGRAM(s) Oral two times a day  sodium bicarbonate 650 milliGRAM(s) Oral three times a day  spironolactone 100 milliGRAM(s) Oral daily  traZODone 100 milliGRAM(s) Oral at bedtime    MEDICATIONS  (PRN):  acetaminophen   Tablet .. 500 milliGRAM(s) Oral every 6 hours PRN Mild Pain (1 - 3), Moderate Pain (4 - 6)  dextrose 40% Gel 15 Gram(s) Oral once PRN Blood Glucose LESS THAN 70 milliGRAM(s)/deciliter  glucagon  Injectable 1 milliGRAM(s) IntraMuscular once PRN Glucose LESS THAN 70 milligrams/deciliter  ondansetron    Tablet 4 milliGRAM(s) Oral every 8 hours PRN Nausea and/or Vomiting      PHYSICAL EXAM  Vital Signs Last 24 Hrs  T(C): 36.5 (20 May 2020 05:46), Max: 36.8 (19 May 2020 13:40)  T(F): 97.7 (20 May 2020 05:46), Max: 98.2 (19 May 2020 13:40)  HR: 66 (20 May 2020 05:46) (58 - 66)  BP: 142/78 (20 May 2020 05:46) (119/76 - 148/67)  BP(mean): --  RR: 19 (20 May 2020 05:46) (18 - 20)  SpO2: 99% (20 May 2020 05:46) (99% - 100%)    Due to the nature of this patient’s COVID-19 isolation status (either confirmed or suspected), this note was prepared without a bedside physical examination to prevent spread of infection and to conserve personal protective equipment during this nationwide pandemic. If possible, direct patient communication occurred via electronic measures or telephone conversation. Examination highlights were provided by a bedside nurse wearing personal protective equipment and review of pertinent medical records. Objective data (vital signs, urine output, lab results, imaging studies, medications, etc) were reviewed in detail. Face to face visits and physical examination have been limited only to patients for whom it is required for medical decision making.  LABS:        127<L>  |  94<L>  |  56<H>  ----------------------------<  205<H>  5.1   |  18<L>  |  1.77<H>    Ca    10.1      19 May 2020 10:20          Thyroid Stimulating Hormone, Serum: 2.389 uIU/mL ( @ 07:38)  Thyroid Stimulating Hormone, Serum: 3.869 uIU/mL ( @ 06:50)  Thyroid Stimulating Hormone, Serum: 3.104 uIU/mL ( @ 06:10)  Thyroid Stimulating Hormone, Serum: 2.712 uIU/mL ( @ 06:31)  Thyroid Stimulating Hormone, Serum: 3.063 uIU/mL ( @ 06:31)  Thyroid Stimulating Hormone, Serum: 3.509 uIU/mL ( @ 06:07)  Thyroid Stimulating Hormone, Serum: 3.325 uIU/mL ( @ 06:17)  Thyroid Stimulating Hormone, Serum: 2.813 uIU/mL ( @ 07:28)  Thyroid Stimulating Hormone, Serum: 2.877 uIU/mL ( @ 00:34)      HbA1C: 6.9 % ( @ 06:59)  7.6 % ( @ 05:58)    CAPILLARY BLOOD GLUCOSE      POCT Blood Glucose.: 186 mg/dL (20 May 2020 07:53)  POCT Blood Glucose.: 162 mg/dL (19 May 2020 21:47)  POCT Blood Glucose.: 161 mg/dL (19 May 2020 16:57)  POCT Blood Glucose.: 213 mg/dL (19 May 2020 11:53)      A/P 60yMale with hx of DM now with COVID, worsening renal function, moderate hyperglycemia.     1.  DM: type 2  Please continue Lantus 16 units at bedtime  PLease conitnue Lispro 8  units tid with meals.   Continue lispro moderate dose scale with meals and at bedtime.   Please increase to metformin 1000 mg BID - with breakfast and dinner   Continue consistent carb diet  FSG Goal 100-180  GFR 40 and EF 65%    2.  Hyperlipidemia - goal LDL < 70  - on lipitor 10 mg once daily    3.  Obesity - outpatient medical management.      4.  Hypothyroidism   - TSH on admission 2.8.   - levothyroxine 50mcg discontinued on  to assess necessity as there is no evidence of autoimmune hypothyroidism at this time.   - NO levothyroxine at this time.   - Repeat TSH 3.32 and free T4 1.03 ().   - TSH 3.5 and Free T4 1.01 on 5/3   - TSh was 2.712 and free T4 was 0.98 on   TSh was 3.104 and free T4 was 0.86 on   TSh was 2.389 and Free T4 0.84 on     For discharge, metformin 1000mg BID and glimepiride 1mg before breakfast.    Case seen and discussed with  and updated the primary team.  Pt can follow up at discharge with [facility] by calling  to make an appointment.

## 2020-05-20 NOTE — PROGRESS NOTE ADULT - PROBLEM SELECTOR PLAN 4
cont. basal-bolus insulin, diabetic diet, metformin; Neurontin for diabetic neuropathy; f/u Endocrine recs

## 2020-05-20 NOTE — PROGRESS NOTE ADULT - PROBLEM SELECTOR PLAN 2
likely multifactorial; renal fxn has returned to baseline; appreciate Renal recs; cont. sodium bicarb, renal diet; monitor BMP, renally-dose rx

## 2020-05-20 NOTE — PROGRESS NOTE ADULT - PROBLEM SELECTOR PLAN 1
non oliguric MEG pic c/w ATN in plateau phase   renal fx remained stable, hyponatremia most likely from hypervolemia i/s/o cirrhosis, back on lasix/spironolactone, worsening slowly over the past few days otherwsie nephrologically stable  - c/w spironolactone/lasix 100/40   - add sodium chloride 1 g TID and c/w fluid restriction and Nabicarb   - please obtain am sNa, would need to trend   Will follow non oliguric MEG pic c/w ATN in plateau phase   renal fx remained stable, hyponatremia most likely from hypervolemia i/s/o cirrhosis, back on lasix/spironolactone, worsening slowly over the past few days otherwsie nephrologically stable  - c/w spironolactone/lasix 100/40   - add sodium chloride 1 g TID and c/w fluid restriction and Nabicarb   - plz obtain Urine Na and Osm   - please obtain am sNa, would need to trend   Will follow

## 2020-05-20 NOTE — PROGRESS NOTE ADULT - ATTENDING COMMENTS
Glucoses have been stable In the 150-190 range, but AM fingersticks was still quite high at 186 today.  Metformin was not increased, apparently because of concern about pt's renal function.  Nonetheless, new guidelines from last year allow metformin use down to eGFR 30/min--pt is therefore a candidate with his value of 40.

## 2020-05-20 NOTE — PROGRESS NOTE ADULT - PROBLEM SELECTOR PLAN 7
Type 2 DM, endocrinology following   -lantus 14U qhs  -lispro 6U TID pre-meal   -mISS  -metformin 500mg bid  -per endo, metformin to be increased to 1000mg bid however pharmacy would not approve as exceeds maximum dosage inpatient  -continue to appreciate endo recs    #hypothyroidism   -TSH 2.813; TPO and antithyroid Abs neg   -levothyroxine 50mcg discontinued per endocrinology 4/29 given low concern for autoimmune hypothyroidism at this time; will assess necessity of continuation of levothyroxine  -continue to appreciate endocrine recs

## 2020-05-20 NOTE — PROGRESS NOTE ADULT - SUBJECTIVE AND OBJECTIVE BOX
O/N Events:  LUCA  sNa slowly decreasing down to 126 in am, otherwise no issues, renal fx stable, acidosis improved, K stable 5.1 on spironolactone     VITALS  Vital Signs Last 24 Hrs  T(C): 36.5 (20 May 2020 05:46), Max: 36.8 (19 May 2020 13:40)  T(F): 97.7 (20 May 2020 05:46), Max: 98.2 (19 May 2020 13:40)  HR: 66 (20 May 2020 05:46) (58 - 66)  BP: 142/78 (20 May 2020 05:46) (119/76 - 148/67)  RR: 19 (20 May 2020 05:46) (18 - 20)  SpO2: 99% (20 May 2020 05:46) (99% - 100%)    PHYSICAL EXAM  deferred per isolation protocol     MEDICATIONS  (STANDING):  amLODIPine   Tablet 10 milliGRAM(s) Oral daily  AQUAPHOR (petrolatum Ointment) 1 Application(s) Topical two times a day  atorvastatin 10 milliGRAM(s) Oral at bedtime  baclofen 2.5 milliGRAM(s) Oral every 12 hours  brimonidine 0.2% Ophthalmic Solution 1 Drop(s) Both EYES three times a day  chlorhexidine 2% Cloths 1 Application(s) Topical daily  dextrose 5%. 1000 milliLiter(s) (50 mL/Hr) IV Continuous <Continuous>  dextrose 50% Injectable 12.5 Gram(s) IV Push once  dextrose 50% Injectable 25 Gram(s) IV Push once  dextrose 50% Injectable 25 Gram(s) IV Push once  diphenoxylate/atropine 1 Tablet(s) Oral daily  dorzolamide 2% Ophthalmic Solution 1 Drop(s) Both EYES three times a day  furosemide    Tablet 40 milliGRAM(s) Oral every 24 hours  gabapentin 300 milliGRAM(s) Oral every 8 hours  heparin   Injectable 7500 Unit(s) SubCutaneous every 8 hours  insulin glargine Injectable (LANTUS) 14 Unit(s) SubCutaneous at bedtime  insulin lispro (HumaLOG) corrective regimen sliding scale   SubCutaneous Before meals and at bedtime  insulin lispro Injectable (HumaLOG) 8 Unit(s) SubCutaneous three times a day before meals  metFORMIN 500 milliGRAM(s) Oral every 12 hours  pantoprazole    Tablet 40 milliGRAM(s) Oral every 12 hours  rifAXIMin 550 milliGRAM(s) Oral two times a day  sodium bicarbonate 650 milliGRAM(s) Oral three times a day  sodium chloride 1 Gram(s) Oral three times a day  spironolactone 100 milliGRAM(s) Oral daily  traZODone 100 milliGRAM(s) Oral at bedtime    MEDICATIONS  (PRN):  acetaminophen   Tablet .. 500 milliGRAM(s) Oral every 6 hours PRN Mild Pain (1 - 3), Moderate Pain (4 - 6)  dextrose 40% Gel 15 Gram(s) Oral once PRN Blood Glucose LESS THAN 70 milliGRAM(s)/deciliter  glucagon  Injectable 1 milliGRAM(s) IntraMuscular once PRN Glucose LESS THAN 70 milligrams/deciliter  ondansetron    Tablet 4 milliGRAM(s) Oral every 8 hours PRN Nausea and/or Vomiting      LABS    05-20    126<L>  |  89<L>  |  59<H>  ----------------------------<  127<H>  5.1   |  22  |  1.79<H>    Ca    10.2      20 May 2020 12:13  Phos  4.8     05-20

## 2020-05-20 NOTE — PROGRESS NOTE ADULT - PROBLEM SELECTOR PLAN 2
decompensated cirrhosis c/b ascites likely 2/2 alcohol abuse  -SBP ruled out, hepatic encephalopathy poa now resolved  -CT abd/pelvis with persistent large ascites and peritonitis; bilateral pleural effusions and pericardial effusion  -paracentesis on 4/10 with negative cytology  -repeat paracentesis 4/19, unable to aspirate significant amount of fluid   -abd u/s 5/3 small ascites   -Rifaximin 550mg bid for hepatic encephalopathy (poa)  -Increase to Lasix 40mg po qd and Spironolactone 100mg po qd for ascites  -holding Lactulose in setting of high ostomy output  -maximum Tylenol 2g qd  -no focal hepatic lesions seen on CT A/P w/IV contrast or abdominal U/S to suggest HCC decompensated cirrhosis c/b ascites likely 2/2 alcohol abuse  -SBP ruled out, hepatic encephalopathy poa now resolved  -CT abd/pelvis with persistent large ascites and peritonitis; bilateral pleural effusions and pericardial effusion  -paracentesis on 4/10 with negative cytology  -repeat paracentesis 4/19, unable to aspirate significant amount of fluid   -abd u/s 5/3 small ascites   -Rifaximin 550mg bid for hepatic encephalopathy (poa)  -Lasix 40mg po qd and Spironolactone 100mg po qd for ascites  -continue holding Lactulose in setting of high ostomy output  -maximum Tylenol 2g qd  -no focal hepatic lesions seen on CT A/P w/IV contrast or abdominal U/S to suggest HCC

## 2020-05-20 NOTE — PROGRESS NOTE ADULT - ASSESSMENT
60M PMH HTN, DM + neuropathy, IVDU, ETOH liver cirrhosis, presents to [facility] s/p fall at home; found with toxic megacolon and pneumatosis; now s/p ex lap + subtotal colectomy, ileostomy (3/31). Hospital course further complicated by COVID, MEG, anemia with suspicion for GI source/varices (refused further GI workeup). Pt stable for discharge pending establishment of proper wound-care services.

## 2020-05-20 NOTE — PROGRESS NOTE ADULT - PROBLEM SELECTOR PLAN 1
4/12 COVID (+); 5/15 repeat PCR (+)  -repeat PCR 5/21  -s/p hydroxychloroquine + azithromycin 4/13-4/16  -stable respiratory status on room air, continue to monitor respiratory status  -continue supportive care  -contact/droplet precautions

## 2020-05-21 LAB
ALBUMIN SERPL ELPH-MCNC: 4 G/DL — SIGNIFICANT CHANGE UP (ref 3.3–5)
ALBUMIN SERPL ELPH-MCNC: 4.1 G/DL — SIGNIFICANT CHANGE UP (ref 3.3–5)
ANION GAP SERPL CALC-SCNC: 17 MMOL/L — SIGNIFICANT CHANGE UP (ref 5–17)
ANION GAP SERPL CALC-SCNC: 18 MMOL/L — HIGH (ref 5–17)
BASE EXCESS BLDA CALC-SCNC: -4.2 MMOL/L — LOW (ref -2–3)
BUN SERPL-MCNC: 58 MG/DL — HIGH (ref 7–23)
BUN SERPL-MCNC: 63 MG/DL — HIGH (ref 7–23)
CALCIUM SERPL-MCNC: 10 MG/DL — SIGNIFICANT CHANGE UP (ref 8.4–10.5)
CALCIUM SERPL-MCNC: 9.8 MG/DL — SIGNIFICANT CHANGE UP (ref 8.4–10.5)
CHLORIDE SERPL-SCNC: 92 MMOL/L — LOW (ref 96–108)
CHLORIDE SERPL-SCNC: 94 MMOL/L — LOW (ref 96–108)
CO2 SERPL-SCNC: 14 MMOL/L — LOW (ref 22–31)
CO2 SERPL-SCNC: 18 MMOL/L — LOW (ref 22–31)
CREAT SERPL-MCNC: 1.73 MG/DL — HIGH (ref 0.5–1.3)
CREAT SERPL-MCNC: 1.89 MG/DL — HIGH (ref 0.5–1.3)
GLUCOSE BLDC GLUCOMTR-MCNC: 100 MG/DL — HIGH (ref 70–99)
GLUCOSE BLDC GLUCOMTR-MCNC: 148 MG/DL — HIGH (ref 70–99)
GLUCOSE BLDC GLUCOMTR-MCNC: 191 MG/DL — HIGH (ref 70–99)
GLUCOSE BLDC GLUCOMTR-MCNC: 211 MG/DL — HIGH (ref 70–99)
GLUCOSE SERPL-MCNC: 124 MG/DL — HIGH (ref 70–99)
GLUCOSE SERPL-MCNC: 261 MG/DL — HIGH (ref 70–99)
HCO3 BLDA-SCNC: 20 MMOL/L — LOW (ref 21–28)
MAGNESIUM SERPL-MCNC: 1.4 MG/DL — LOW (ref 1.6–2.6)
PCO2 BLDA: 33 MMHG — LOW (ref 35–48)
PH BLDA: 7.4 — SIGNIFICANT CHANGE UP (ref 7.35–7.45)
PHOSPHATE SERPL-MCNC: 5.3 MG/DL — HIGH (ref 2.5–4.5)
PO2 BLDA: 108 MMHG — SIGNIFICANT CHANGE UP (ref 83–108)
POTASSIUM SERPL-MCNC: 5.1 MMOL/L — SIGNIFICANT CHANGE UP (ref 3.5–5.3)
POTASSIUM SERPL-MCNC: SIGNIFICANT CHANGE UP MMOL/L (ref 3.5–5.3)
POTASSIUM SERPL-SCNC: 5.1 MMOL/L — SIGNIFICANT CHANGE UP (ref 3.5–5.3)
POTASSIUM SERPL-SCNC: SIGNIFICANT CHANGE UP MMOL/L (ref 3.5–5.3)
SAO2 % BLDA: 98 % — SIGNIFICANT CHANGE UP (ref 95–100)
SARS-COV-2 RNA SPEC QL NAA+PROBE: DETECTED
SODIUM SERPL-SCNC: 123 MMOL/L — LOW (ref 135–145)
SODIUM SERPL-SCNC: 130 MMOL/L — LOW (ref 135–145)
URATE SERPL-MCNC: 9 MG/DL — HIGH (ref 3.4–8.8)

## 2020-05-21 PROCEDURE — 99232 SBSQ HOSP IP/OBS MODERATE 35: CPT

## 2020-05-21 PROCEDURE — 99232 SBSQ HOSP IP/OBS MODERATE 35: CPT | Mod: GC

## 2020-05-21 PROCEDURE — 99231 SBSQ HOSP IP/OBS SF/LOW 25: CPT | Mod: GC

## 2020-05-21 RX ORDER — MAGNESIUM SULFATE 500 MG/ML
2 VIAL (ML) INJECTION ONCE
Refills: 0 | Status: COMPLETED | OUTPATIENT
Start: 2020-05-21 | End: 2020-05-21

## 2020-05-21 RX ORDER — INSULIN GLARGINE 100 [IU]/ML
16 INJECTION, SOLUTION SUBCUTANEOUS AT BEDTIME
Refills: 0 | Status: DISCONTINUED | OUTPATIENT
Start: 2020-05-21 | End: 2020-05-24

## 2020-05-21 RX ADMIN — CHLORHEXIDINE GLUCONATE 1 APPLICATION(S): 213 SOLUTION TOPICAL at 13:06

## 2020-05-21 RX ADMIN — Medication 4: at 08:41

## 2020-05-21 RX ADMIN — Medication 40 MILLIGRAM(S): at 05:34

## 2020-05-21 RX ADMIN — BRIMONIDINE TARTRATE 1 DROP(S): 2 SOLUTION/ DROPS OPHTHALMIC at 12:00

## 2020-05-21 RX ADMIN — SODIUM CHLORIDE 1 GRAM(S): 9 INJECTION INTRAMUSCULAR; INTRAVENOUS; SUBCUTANEOUS at 05:34

## 2020-05-21 RX ADMIN — ATORVASTATIN CALCIUM 10 MILLIGRAM(S): 80 TABLET, FILM COATED ORAL at 22:31

## 2020-05-21 RX ADMIN — SODIUM CHLORIDE 1 GRAM(S): 9 INJECTION INTRAMUSCULAR; INTRAVENOUS; SUBCUTANEOUS at 22:26

## 2020-05-21 RX ADMIN — GABAPENTIN 300 MILLIGRAM(S): 400 CAPSULE ORAL at 22:27

## 2020-05-21 RX ADMIN — Medication 8 UNIT(S): at 08:43

## 2020-05-21 RX ADMIN — Medication 2: at 22:24

## 2020-05-21 RX ADMIN — SODIUM CHLORIDE 1 GRAM(S): 9 INJECTION INTRAMUSCULAR; INTRAVENOUS; SUBCUTANEOUS at 13:05

## 2020-05-21 RX ADMIN — DORZOLAMIDE HYDROCHLORIDE 1 DROP(S): 20 SOLUTION/ DROPS OPHTHALMIC at 05:36

## 2020-05-21 RX ADMIN — BRIMONIDINE TARTRATE 1 DROP(S): 2 SOLUTION/ DROPS OPHTHALMIC at 05:36

## 2020-05-21 RX ADMIN — Medication 100 MILLIGRAM(S): at 22:27

## 2020-05-21 RX ADMIN — METFORMIN HYDROCHLORIDE 1000 MILLIGRAM(S): 850 TABLET ORAL at 08:49

## 2020-05-21 RX ADMIN — Medication 650 MILLIGRAM(S): at 13:06

## 2020-05-21 RX ADMIN — PANTOPRAZOLE SODIUM 40 MILLIGRAM(S): 20 TABLET, DELAYED RELEASE ORAL at 05:02

## 2020-05-21 RX ADMIN — PANTOPRAZOLE SODIUM 40 MILLIGRAM(S): 20 TABLET, DELAYED RELEASE ORAL at 17:06

## 2020-05-21 RX ADMIN — HEPARIN SODIUM 7500 UNIT(S): 5000 INJECTION INTRAVENOUS; SUBCUTANEOUS at 22:25

## 2020-05-21 RX ADMIN — METFORMIN HYDROCHLORIDE 1000 MILLIGRAM(S): 850 TABLET ORAL at 17:06

## 2020-05-21 RX ADMIN — GABAPENTIN 300 MILLIGRAM(S): 400 CAPSULE ORAL at 05:35

## 2020-05-21 RX ADMIN — HEPARIN SODIUM 7500 UNIT(S): 5000 INJECTION INTRAVENOUS; SUBCUTANEOUS at 13:06

## 2020-05-21 RX ADMIN — Medication 1 APPLICATION(S): at 16:17

## 2020-05-21 RX ADMIN — GABAPENTIN 300 MILLIGRAM(S): 400 CAPSULE ORAL at 13:05

## 2020-05-21 RX ADMIN — DORZOLAMIDE HYDROCHLORIDE 1 DROP(S): 20 SOLUTION/ DROPS OPHTHALMIC at 12:01

## 2020-05-21 RX ADMIN — DORZOLAMIDE HYDROCHLORIDE 1 DROP(S): 20 SOLUTION/ DROPS OPHTHALMIC at 22:34

## 2020-05-21 RX ADMIN — Medication 1 TABLET(S): at 13:05

## 2020-05-21 RX ADMIN — Medication 2.5 MILLIGRAM(S): at 06:13

## 2020-05-21 RX ADMIN — Medication 50 GRAM(S): at 13:11

## 2020-05-21 RX ADMIN — Medication 650 MILLIGRAM(S): at 05:35

## 2020-05-21 RX ADMIN — Medication 8 UNIT(S): at 12:01

## 2020-05-21 RX ADMIN — Medication 650 MILLIGRAM(S): at 22:28

## 2020-05-21 RX ADMIN — HEPARIN SODIUM 7500 UNIT(S): 5000 INJECTION INTRAVENOUS; SUBCUTANEOUS at 05:34

## 2020-05-21 RX ADMIN — AMLODIPINE BESYLATE 10 MILLIGRAM(S): 2.5 TABLET ORAL at 05:35

## 2020-05-21 RX ADMIN — Medication 2.5 MILLIGRAM(S): at 17:06

## 2020-05-21 RX ADMIN — Medication 500 MILLIGRAM(S): at 22:23

## 2020-05-21 RX ADMIN — Medication 8 UNIT(S): at 17:05

## 2020-05-21 RX ADMIN — Medication 1 APPLICATION(S): at 05:37

## 2020-05-21 RX ADMIN — BRIMONIDINE TARTRATE 1 DROP(S): 2 SOLUTION/ DROPS OPHTHALMIC at 22:34

## 2020-05-21 RX ADMIN — INSULIN GLARGINE 16 UNIT(S): 100 INJECTION, SOLUTION SUBCUTANEOUS at 22:24

## 2020-05-21 RX ADMIN — SPIRONOLACTONE 100 MILLIGRAM(S): 25 TABLET, FILM COATED ORAL at 05:35

## 2020-05-21 NOTE — PROGRESS NOTE ADULT - PROBLEM SELECTOR PLAN 2
decompensated cirrhosis c/b ascites likely 2/2 alcohol abuse  -SBP ruled out, hepatic encephalopathy poa now resolved  -paracentesis on 4/10 with negative cytology  -abd u/s 5/3 small ascites   -Rifaximin 550mg bid for hepatic encephalopathy (poa)  -Lasix 40mg po qd + Spironolactone 100mg po qd for ascites  -continue holding Lactulose in setting of high ostomy output  -maximum Tylenol 2g qd  -no focal hepatic lesions seen on CT A/P w/IV contrast or abdominal U/S to suggest HCC

## 2020-05-21 NOTE — PROGRESS NOTE ADULT - ATTENDING COMMENTS
persistent hyponatremia, azotemia, metabolic acidosis  AM sodium 123 with CO2 of14  repeat chem obtained- Na 130  okay to c/w present lasix/salt tab regimen

## 2020-05-21 NOTE — PROGRESS NOTE ADULT - PROBLEM SELECTOR PLAN 1
4/12 COVID PCR (+); 5/15 repeat PCR (+); 5/20 repeat PCR (+)  -s/p hydroxychloroquine + azithromycin 4/13-4/16  -stable respiratory status on room air, continue to monitor respiratory status  -continue supportive care  -contact/droplet precautions

## 2020-05-21 NOTE — PROGRESS NOTE ADULT - PROBLEM SELECTOR PLAN 5
cont. basal-bolus insulin, diabetic diet, metformin (dose increased, per Endocrine recs); Neurontin for diabetic neuropathy

## 2020-05-21 NOTE — PROGRESS NOTE ADULT - PROBLEM SELECTOR PLAN 1
initially attributed to hypervolemia i/s/o cirrhosis, diuretics resumed with spironolactone/ lasix 100/40 with worsening of hypoNa acutely for the past 3 days   - please repeat BMP stat, also Bicarb down acutely from 22 to 14, no AG  - obtain ABG  - Stat Urine Na and Osm   Will follow closely, recs pending repeat labs initially attributed to hypervolemia i/s/o cirrhosis, diuretics resumed with spironolactone/ lasix 100/40 with worsening of hypoNa acutely for the past 3 days   MS unchanged and at baseline   - please repeat BMP stat with alb and UA, also Bicarb down acutely from 22 to 14, no AG  - obtain ABG  - Stat Urine Na and Osm   Will follow closely, recs pending repeat labs

## 2020-05-21 NOTE — PROGRESS NOTE ADULT - ATTENDING COMMENTS
Glucoses remain moderately elevated, but also more erratic--190-260 until dropped to 100 mg% prior to lunch today.  Still to early to see effect of increased metformin on the FBS, but given the value of 211 today, will increase the Lantus to 16 units.

## 2020-05-21 NOTE — PROGRESS NOTE ADULT - SUBJECTIVE AND OBJECTIVE BOX
OVERNIGHT EVENTS: No acute events overnight.  SUBJECTIVE: Patient seen and examined at the bedside. Patient notes unchanged ostomy output rate/consistency; denies sob; no new complaints - 12-point ROS reviewed and is otherwise negative.    Vital Signs Last 12 Hrs  T(F): 97.5 (05-21-20 @ 05:37), Max: 98 (05-20-20 @ 20:49)  HR: 70 (05-21-20 @ 05:37) (70 - 70)  BP: 130/71 (05-21-20 @ 05:37) (130/71 - 143/73)  BP(mean): --  RR: 18 (05-21-20 @ 05:37) (18 - 18)  SpO2: 98% (05-21-20 @ 05:37) (98% - 98%)    I&O's Summary  20 May 2020 07:01  -  21 May 2020 07:00  --------------------------------------------------------  IN: 0 mL / OUT: 1350 mL / NET: -1350 mL    PHYSICAL EXAM:  General: NAD, resting comfortably in bed, AAOx3  HEENT: NCAT, EOMI, MMM  Neck: supple  Respiratory: CTA, normal respiratory rate/depth/effort  Cardiovascular: normal S1/S2, RRR, no MRG  Vascular: 2+ radial/DP pulses b/l  Abdomen: NT, soft, ileostomy output nonbloody; midline wound dressing c/d/i  Extremities: WWP, no peripheral edema    LABS:    05-20    126<L>  |  89<L>  |  59<H>  ----------------------------<  127<H>  5.1   |  22  |  1.79<H>    Ca    10.2      20 May 2020 12:13  Phos  4.8     05-20            RADIOLOGY & ADDITIONAL TESTS: Reviewed    MEDICATIONS  (STANDING):  amLODIPine   Tablet 10 milliGRAM(s) Oral daily  AQUAPHOR (petrolatum Ointment) 1 Application(s) Topical two times a day  atorvastatin 10 milliGRAM(s) Oral at bedtime  baclofen 2.5 milliGRAM(s) Oral every 12 hours  brimonidine 0.2% Ophthalmic Solution 1 Drop(s) Both EYES three times a day  chlorhexidine 2% Cloths 1 Application(s) Topical daily  dextrose 5%. 1000 milliLiter(s) (50 mL/Hr) IV Continuous <Continuous>  dextrose 50% Injectable 12.5 Gram(s) IV Push once  dextrose 50% Injectable 25 Gram(s) IV Push once  dextrose 50% Injectable 25 Gram(s) IV Push once  diphenoxylate/atropine 1 Tablet(s) Oral daily  dorzolamide 2% Ophthalmic Solution 1 Drop(s) Both EYES three times a day  furosemide    Tablet 40 milliGRAM(s) Oral every 24 hours  gabapentin 300 milliGRAM(s) Oral every 8 hours  heparin   Injectable 7500 Unit(s) SubCutaneous every 8 hours  insulin glargine Injectable (LANTUS) 14 Unit(s) SubCutaneous at bedtime  insulin lispro (HumaLOG) corrective regimen sliding scale   SubCutaneous Before meals and at bedtime  insulin lispro Injectable (HumaLOG) 8 Unit(s) SubCutaneous three times a day before meals  metFORMIN 1000 milliGRAM(s) Oral two times a day with meals  pantoprazole    Tablet 40 milliGRAM(s) Oral every 12 hours  rifAXIMin 550 milliGRAM(s) Oral two times a day  sodium bicarbonate 650 milliGRAM(s) Oral three times a day  sodium chloride 1 Gram(s) Oral three times a day  spironolactone 100 milliGRAM(s) Oral daily  traZODone 100 milliGRAM(s) Oral at bedtime    MEDICATIONS  (PRN):  acetaminophen   Tablet .. 500 milliGRAM(s) Oral every 6 hours PRN Mild Pain (1 - 3), Moderate Pain (4 - 6)  dextrose 40% Gel 15 Gram(s) Oral once PRN Blood Glucose LESS THAN 70 milliGRAM(s)/deciliter  glucagon  Injectable 1 milliGRAM(s) IntraMuscular once PRN Glucose LESS THAN 70 milligrams/deciliter  ondansetron    Tablet 4 milliGRAM(s) Oral every 8 hours PRN Nausea and/or Vomiting

## 2020-05-21 NOTE — PROGRESS NOTE ADULT - SUBJECTIVE AND OBJECTIVE BOX
INTERVAL HPI/OVERNIGHT EVENTS:    Patient is a 60y old  Male who presents with a chief complaint of toxic megacolon, COVID + (06 May 2020 10:01)      Pt reports the following symptoms:    CONSTITUTIONAL:  Negative fever or chills, feels well, good appetite  EYES:  Negative  blurry vision or double vision  CARDIOVASCULAR:  Negative for chest pain or palpitations  RESPIRATORY:  Negative for cough, wheezing, or SOB   GASTROINTESTINAL:  Negative for nausea, vomiting, diarrhea, constipation, or abdominal pain  GENITOURINARY:  Negative frequency, urgency or dysuria  NEUROLOGIC:  No headache, confusion, dizziness, lightheadedness    MEDICATIONS  (STANDING):  amLODIPine   Tablet 10 milliGRAM(s) Oral daily  AQUAPHOR (petrolatum Ointment) 1 Application(s) Topical two times a day  atorvastatin 10 milliGRAM(s) Oral at bedtime  baclofen 2.5 milliGRAM(s) Oral every 12 hours  brimonidine 0.2% Ophthalmic Solution 1 Drop(s) Both EYES three times a day  chlorhexidine 2% Cloths 1 Application(s) Topical daily  dextrose 5%. 1000 milliLiter(s) (50 mL/Hr) IV Continuous <Continuous>  dextrose 50% Injectable 12.5 Gram(s) IV Push once  dextrose 50% Injectable 25 Gram(s) IV Push once  dextrose 50% Injectable 25 Gram(s) IV Push once  diphenoxylate/atropine 1 Tablet(s) Oral daily  dorzolamide 2% Ophthalmic Solution 1 Drop(s) Both EYES three times a day  furosemide    Tablet 40 milliGRAM(s) Oral every 24 hours  gabapentin 300 milliGRAM(s) Oral every 8 hours  heparin   Injectable 7500 Unit(s) SubCutaneous every 8 hours  insulin glargine Injectable (LANTUS) 14 Unit(s) SubCutaneous at bedtime  insulin lispro (HumaLOG) corrective regimen sliding scale   SubCutaneous Before meals and at bedtime  insulin lispro Injectable (HumaLOG) 8 Unit(s) SubCutaneous three times a day before meals  magnesium sulfate  IVPB 2 Gram(s) IV Intermittent once  metFORMIN 1000 milliGRAM(s) Oral two times a day with meals  pantoprazole    Tablet 40 milliGRAM(s) Oral every 12 hours  rifAXIMin 550 milliGRAM(s) Oral two times a day  sodium bicarbonate 650 milliGRAM(s) Oral three times a day  sodium chloride 1 Gram(s) Oral three times a day  spironolactone 100 milliGRAM(s) Oral daily  traZODone 100 milliGRAM(s) Oral at bedtime    MEDICATIONS  (PRN):  acetaminophen   Tablet .. 500 milliGRAM(s) Oral every 6 hours PRN Mild Pain (1 - 3), Moderate Pain (4 - 6)  dextrose 40% Gel 15 Gram(s) Oral once PRN Blood Glucose LESS THAN 70 milliGRAM(s)/deciliter  glucagon  Injectable 1 milliGRAM(s) IntraMuscular once PRN Glucose LESS THAN 70 milligrams/deciliter  ondansetron    Tablet 4 milliGRAM(s) Oral every 8 hours PRN Nausea and/or Vomiting      PHYSICAL EXAM  Vital Signs Last 24 Hrs  T(C): 36.4 (21 May 2020 05:37), Max: 36.7 (20 May 2020 20:49)  T(F): 97.5 (21 May 2020 05:37), Max: 98 (20 May 2020 20:49)  HR: 70 (21 May 2020 05:37) (65 - 70)  BP: 130/71 (21 May 2020 05:37) (130/71 - 154/77)  BP(mean): --  RR: 18 (21 May 2020 05:37) (18 - 19)  SpO2: 98% (21 May 2020 05:37) (98% - 100%)    Constitutional: wn/wd in NAD.   HEENT: NCAT, MMM, OP clear, EOMI, no proptosis or lid retraction  Neck: no thyromegaly or palpable thyroid nodules   Respiratory: lungs CTAB.  Cardiovascular: regular rhythm, normal S1 and S2, no audible murmurs, no peripheral edema  GI: soft, NT/ND, no masses/HSM appreciated.  Neurology: no tremors, DTR 2+  Skin: no visible rashes/lesions  Psychiatric: AAO x 3, normal affect/mood.    LABS:    05-21    123<L>  |  92<L>  |  63<H>  ----------------------------<  261<H>  see note   |  14<L>  |  1.73<H>    Ca    10.0      21 May 2020 10:25  Phos  5.3     05-21  Mg     1.4     05-21          Thyroid Stimulating Hormone, Serum: 2.389 uIU/mL (05-18 @ 07:38)  Thyroid Stimulating Hormone, Serum: 3.869 uIU/mL (05-13 @ 06:50)  Thyroid Stimulating Hormone, Serum: 3.104 uIU/mL (05-11 @ 06:10)  Thyroid Stimulating Hormone, Serum: 2.712 uIU/mL (05-07 @ 06:31)  Thyroid Stimulating Hormone, Serum: 3.063 uIU/mL (05-07 @ 06:31)  Thyroid Stimulating Hormone, Serum: 3.509 uIU/mL (05-03 @ 06:07)  Thyroid Stimulating Hormone, Serum: 3.325 uIU/mL (04-29 @ 06:17)  Thyroid Stimulating Hormone, Serum: 2.813 uIU/mL (04-14 @ 07:28)  Thyroid Stimulating Hormone, Serum: 2.877 uIU/mL (04-14 @ 00:34)      HbA1C: 6.9 % (04-05 @ 06:59)  7.6 % (03-31 @ 05:58)    CAPILLARY BLOOD GLUCOSE      POCT Blood Glucose.: 100 mg/dL (21 May 2020 11:28)  POCT Blood Glucose.: 211 mg/dL (21 May 2020 07:37)  POCT Blood Glucose.: 264 mg/dL (20 May 2020 21:55)  POCT Blood Glucose.: 196 mg/dL (20 May 2020 16:41)      Insulin Sliding Scale requirements X 24 Hours:    RADIOLOGY & ADDITIONAL TESTS:    A/P: 60y Male with history of DM type II presenting for       1.  DM -     Please continue           units lantus at bedtime  / in the morning and        units lispro with meals and lispro moderate / low dose sliding scale 4 times daily with meals and at bedtime.  Please continue consistent carbohydrate diet.      Goal FSG is   Will continue to monitor   For discharge, pt can continue    Pt can follow up at discharge with [facility] by calling  to make an appointment.   Will discuss case with     and update primary team INTERVAL HPI/OVERNIGHT EVENTS:    Patient is a 60y old  Male who presents with a chief complaint of toxic megacolon, COVID + (06 May 2020 10:01)  Spoke to the primary team taking care of the patient.   Patient seen at the bedside. Patient was very frustrated and angry that they are doing frequent blood draws and was not ready to involve nay conversation  No acute events.   Able to tolerate metformin 1000 BID- No GI disturbances  Saturating well on RA. appetite is good.   TSH was 2.389 and Free T4 0.84  Sodium was 123 in the morning ( hemolyzed sample). repeat sodium was 130 - on salt tabs and fluid restriction- being managed by nephro - Cr 1.89 and GFR 38    FSG & Insulin received:  Yesterday:  pre-dinner fs, 8 units lispro + 2.  bedtime fs, 14  lantus   units + 6 units lispro SS  Today:  pre-breakfast fs, 8 nutritional lispro   units + 4 units lispro SS. FSG taken before BF only  pre-lunch fs    Pt reports the following symptoms:  CONSTITUTIONAL:  Negative fever or chills, feels well, good appetite  CARDIOVASCULAR:  Negative for chest pain or palpitations  RESPIRATORY:  Negative for cough, wheezing, or SOB   GASTROINTESTINAL:  Negative for nausea, vomiting, diarrhea, constipation, or abdominal pain  NEUROLOGIC:  No headache, confusion, dizziness, lightheadedness      MEDICATIONS  (STANDING):  amLODIPine   Tablet 10 milliGRAM(s) Oral daily  AQUAPHOR (petrolatum Ointment) 1 Application(s) Topical two times a day  atorvastatin 10 milliGRAM(s) Oral at bedtime  baclofen 2.5 milliGRAM(s) Oral every 12 hours  brimonidine 0.2% Ophthalmic Solution 1 Drop(s) Both EYES three times a day  chlorhexidine 2% Cloths 1 Application(s) Topical daily  dextrose 5%. 1000 milliLiter(s) (50 mL/Hr) IV Continuous <Continuous>  dextrose 50% Injectable 12.5 Gram(s) IV Push once  dextrose 50% Injectable 25 Gram(s) IV Push once  dextrose 50% Injectable 25 Gram(s) IV Push once  diphenoxylate/atropine 1 Tablet(s) Oral daily  dorzolamide 2% Ophthalmic Solution 1 Drop(s) Both EYES three times a day  furosemide    Tablet 40 milliGRAM(s) Oral every 24 hours  gabapentin 300 milliGRAM(s) Oral every 8 hours  heparin   Injectable 7500 Unit(s) SubCutaneous every 8 hours  insulin glargine Injectable (LANTUS) 14 Unit(s) SubCutaneous at bedtime  insulin lispro (HumaLOG) corrective regimen sliding scale   SubCutaneous Before meals and at bedtime  insulin lispro Injectable (HumaLOG) 8 Unit(s) SubCutaneous three times a day before meals  magnesium sulfate  IVPB 2 Gram(s) IV Intermittent once  metFORMIN 1000 milliGRAM(s) Oral two times a day with meals  pantoprazole    Tablet 40 milliGRAM(s) Oral every 12 hours  rifAXIMin 550 milliGRAM(s) Oral two times a day  sodium bicarbonate 650 milliGRAM(s) Oral three times a day  sodium chloride 1 Gram(s) Oral three times a day  spironolactone 100 milliGRAM(s) Oral daily  traZODone 100 milliGRAM(s) Oral at bedtime    MEDICATIONS  (PRN):  acetaminophen   Tablet .. 500 milliGRAM(s) Oral every 6 hours PRN Mild Pain (1 - 3), Moderate Pain (4 - 6)  dextrose 40% Gel 15 Gram(s) Oral once PRN Blood Glucose LESS THAN 70 milliGRAM(s)/deciliter  glucagon  Injectable 1 milliGRAM(s) IntraMuscular once PRN Glucose LESS THAN 70 milligrams/deciliter  ondansetron    Tablet 4 milliGRAM(s) Oral every 8 hours PRN Nausea and/or Vomiting      PHYSICAL EXAM  Vital Signs Last 24 Hrs  T(C): 36.4 (21 May 2020 05:37), Max: 36.7 (20 May 2020 20:49)  T(F): 97.5 (21 May 2020 05:37), Max: 98 (20 May 2020 20:49)  HR: 70 (21 May 2020 05:37) (65 - 70)  BP: 130/71 (21 May 2020 05:37) (130/71 - 154/77)  BP(mean): --  RR: 18 (21 May 2020 05:37) (18 - 19)  SpO2: 98% (21 May 2020 05:37) (98% - 100%)    Due to the nature of this patient’s COVID-19 isolation status (either confirmed or suspected), this note was prepared without a bedside physical examination to prevent spread of infection and to conserve personal protective equipment during this nationwide pandemic. If possible, direct patient communication occurred via electronic measures or telephone conversation. Examination highlights were provided by a bedside nurse wearing personal protective equipment and review of pertinent medical records. Objective data (vital signs, urine output, lab results, imaging studies, medications, etc) were reviewed in detail. Face to face visits and physical examination have been limited only to patients for whom it is required for medical decision making.    LABS:        123<L>  |  92<L>  |  63<H>  ----------------------------<  261<H>  see note   |  14<L>  |  1.73<H>    Ca    10.0      21 May 2020 10:25  Phos  5.3       Mg     1.4               Thyroid Stimulating Hormone, Serum: 2.389 uIU/mL ( @ 07:38)  Thyroid Stimulating Hormone, Serum: 3.869 uIU/mL ( @ 06:50)  Thyroid Stimulating Hormone, Serum: 3.104 uIU/mL ( @ 06:10)  Thyroid Stimulating Hormone, Serum: 2.712 uIU/mL ( @ 06:31)  Thyroid Stimulating Hormone, Serum: 3.063 uIU/mL ( @ 06:31)  Thyroid Stimulating Hormone, Serum: 3.509 uIU/mL ( @ 06:07)  Thyroid Stimulating Hormone, Serum: 3.325 uIU/mL ( @ 06:17)  Thyroid Stimulating Hormone, Serum: 2.813 uIU/mL ( @ 07:28)  Thyroid Stimulating Hormone, Serum: 2.877 uIU/mL ( @ 00:34)      HbA1C: 6.9 % ( @ 06:59)  7.6 % ( @ 05:58)    CAPILLARY BLOOD GLUCOSE      POCT Blood Glucose.: 100 mg/dL (21 May 2020 11:28)  POCT Blood Glucose.: 211 mg/dL (21 May 2020 07:37)  POCT Blood Glucose.: 264 mg/dL (20 May 2020 21:55)  POCT Blood Glucose.: 196 mg/dL (20 May 2020 16:41)      Insulin Sliding Scale requirements X 24 Hours:    RADIOLOGY & ADDITIONAL TESTS:    A/P 60yMale with hx of DM now with COVID, worsening renal function, moderate hyperglycemia.     1.  DM: type 2  Please increase to Lantus 16 units at bedtime  PLease conitnue Lispro 8  units tid with meals.   Continue lispro moderate dose scale with meals and at bedtime.   Please continue metformin 1000 mg BID - with breakfast and dinner   Continue consistent carb diet  FSG Goal 100-180  GFR 40 and EF 65%    2.  Hyperlipidemia - goal LDL < 70  - on lipitor 10 mg once daily    3.  Obesity - outpatient medical management.      4.  Hypothyroidism   - TSH on admission 2.8.   - levothyroxine 50mcg discontinued on  to assess necessity as there is no evidence of autoimmune hypothyroidism at this time.   - NO levothyroxine at this time.   - Repeat TSH 3.32 and free T4 1.03 ().   - TSH 3.5 and Free T4 1.01 on 5/3   - TSh was 2.712 and free T4 was 0.98 on   TSh was 3.104 and free T4 was 0.86 on   TSh was 2.389 and Free T4 0.84 on     For discharge, TBD    Case seen and discussed with  and updated the primary team.  Pt can follow up at discharge with [facility] by calling  to make an appointment.

## 2020-05-21 NOTE — PROGRESS NOTE ADULT - SUBJECTIVE AND OBJECTIVE BOX
Patient is a 60y old  Male who presents with a chief complaint of toxic megacolon, COVID + (06 May 2020 10:01)      INTERVAL HPI/OVERNIGHT EVENTS:    Pt. seen and examined earlier today  No acute complaints  Pt. disappointed re: persistently positive COVID testing; emotional support provided    Review of Systems: 12 point review of systems otherwise negative    MEDICATIONS  (STANDING):  amLODIPine   Tablet 10 milliGRAM(s) Oral daily  AQUAPHOR (petrolatum Ointment) 1 Application(s) Topical two times a day  atorvastatin 10 milliGRAM(s) Oral at bedtime  baclofen 2.5 milliGRAM(s) Oral every 12 hours  brimonidine 0.2% Ophthalmic Solution 1 Drop(s) Both EYES three times a day  chlorhexidine 2% Cloths 1 Application(s) Topical daily  dextrose 5%. 1000 milliLiter(s) (50 mL/Hr) IV Continuous <Continuous>  dextrose 50% Injectable 12.5 Gram(s) IV Push once  dextrose 50% Injectable 25 Gram(s) IV Push once  dextrose 50% Injectable 25 Gram(s) IV Push once  diphenoxylate/atropine 1 Tablet(s) Oral daily  dorzolamide 2% Ophthalmic Solution 1 Drop(s) Both EYES three times a day  furosemide    Tablet 40 milliGRAM(s) Oral every 24 hours  gabapentin 300 milliGRAM(s) Oral every 8 hours  heparin   Injectable 7500 Unit(s) SubCutaneous every 8 hours  insulin glargine Injectable (LANTUS) 14 Unit(s) SubCutaneous at bedtime  insulin lispro (HumaLOG) corrective regimen sliding scale   SubCutaneous Before meals and at bedtime  insulin lispro Injectable (HumaLOG) 8 Unit(s) SubCutaneous three times a day before meals  magnesium sulfate  IVPB 2 Gram(s) IV Intermittent once  metFORMIN 1000 milliGRAM(s) Oral two times a day with meals  pantoprazole    Tablet 40 milliGRAM(s) Oral every 12 hours  rifAXIMin 550 milliGRAM(s) Oral two times a day  sodium bicarbonate 650 milliGRAM(s) Oral three times a day  sodium chloride 1 Gram(s) Oral three times a day  spironolactone 100 milliGRAM(s) Oral daily  traZODone 100 milliGRAM(s) Oral at bedtime    MEDICATIONS  (PRN):  acetaminophen   Tablet .. 500 milliGRAM(s) Oral every 6 hours PRN Mild Pain (1 - 3), Moderate Pain (4 - 6)  dextrose 40% Gel 15 Gram(s) Oral once PRN Blood Glucose LESS THAN 70 milliGRAM(s)/deciliter  glucagon  Injectable 1 milliGRAM(s) IntraMuscular once PRN Glucose LESS THAN 70 milligrams/deciliter  ondansetron    Tablet 4 milliGRAM(s) Oral every 8 hours PRN Nausea and/or Vomiting      Allergies    No Known Allergies    Intolerances          Vital Signs Last 24 Hrs  T(C): 36.4 (21 May 2020 05:37), Max: 36.7 (20 May 2020 20:49)  T(F): 97.5 (21 May 2020 05:37), Max: 98 (20 May 2020 20:49)  HR: 70 (21 May 2020 05:37) (65 - 70)  BP: 130/71 (21 May 2020 05:37) (130/71 - 154/77)  BP(mean): --  RR: 18 (21 May 2020 05:37) (18 - 19)  SpO2: 98% (21 May 2020 05:37) (98% - 100%)  CAPILLARY BLOOD GLUCOSE      POCT Blood Glucose.: 100 mg/dL (21 May 2020 11:28)  POCT Blood Glucose.: 211 mg/dL (21 May 2020 07:37)  POCT Blood Glucose.: 264 mg/dL (20 May 2020 21:55)  POCT Blood Glucose.: 196 mg/dL (20 May 2020 16:41)      05-20 @ 07:01  -  05-21 @ 07:00  --------------------------------------------------------  IN: 0 mL / OUT: 1350 mL / NET: -1350 mL    05-21 @ 07:01  -  05-21 @ 12:34  --------------------------------------------------------  IN: 0 mL / OUT: 500 mL / NET: -500 mL        Physical Exam: (earlier today)    Daily     Daily   General: comfortable-appearing in NAD  HEENT: MMM  Lungs:  normal WOB on RA  Abdomen:  +ileostomy, surgical dressing C/D/I  Skin:  WWP  Neuro:  AAOx3  LABS:    05-21    123<L>  |  92<L>  |  63<H>  ----------------------------<  261<H>  see note   |  14<L>  |  1.73<H>    Ca    10.0      21 May 2020 10:25  Phos  5.3     05-21  Mg     1.4     05-21              RADIOLOGY & ADDITIONAL TESTS:    ---------------------------------------------------------------------------  I personally reviewed: [  ]EKG   [  ]CXR    [  ] CT    [  ]Other  ---------------------------------------------------------------------------  PLEASE CHECK WHEN PRESENT:     [  ]Heart Failure     [  ] Acute     [  ] Acute on Chronic     [  ] Chronic  -------------------------------------------------------------------     [  ]Diastolic [HFpEF]     [  ]Systolic [HFrEF]     [  ]Combined [HFpEF & HFrEF]     [  ]Other:  -------------------------------------------------------------------  [  ]MEG     [  ]ATN     [  ]Reneal Medullary Necrosis     [  ]Renal Cortical Necrosis     [  ]Other Pathological Lesions:    [  ]CKD 1  [  ]CKD 2  [  ]CKD 3  [  ]CKD 4  [  ]CKD 5  [  ]Other  -------------------------------------------------------------------  [  ]Other/Unspecified:    --------------------------------------------------------------------    Abdominal Nutritional Status  [  ]Malnutrition: See Nutrition Note  [  ]Cachexia  [  ]Other:   [  ]Supplement Ordered:  [  ]Morbid Obesity (BMI >=40]

## 2020-05-21 NOTE — PROGRESS NOTE ADULT - ASSESSMENT
60M PMH HTN, DM + neuropathy, IVDU, ETOH liver cirrhosis, presents to [facility] s/p fall at home; found with toxic megacolon and pneumatosis; now s/p ex lap + subtotal colectomy, ileostomy (3/31). Hospital course further complicated by COVID, MEG, anemia with suspicion for GI source/varices (refused further GI workeup). Pt stable for discharge pending establishment of proper wound-care services.

## 2020-05-21 NOTE — PROGRESS NOTE ADULT - PROBLEM SELECTOR PLAN 3
now s/p 3/31/20 ex lap with subtotal colectomy+ileostomy  course c/b fever and purulent wound infection (4/9) s/p vanc/zosyn now resolved  -s/p laparotomy, daily dressings for midline incision  -ileostomy in place draining brown stool with no signs of bleeding  -lomotil low dose for high ostomy output  -general surgery following, appreciate recs  -woundcare

## 2020-05-21 NOTE — PROGRESS NOTE ADULT - PROBLEM SELECTOR PLAN 4
nonoliguric MEG likely 2/2 pre-renal + intrinsic 2/2 COVID-19 + recent initiation/increase of Lasix  -BMP 5/21  -avoid nephrotoxic agents  -consider gentle IV hydration if GI output increases  -monitor I/Os   -renal following - appreciate recs    #Non-anion gap metabolic acidosis   -sodium bicarbonate TID  -f/u further nephrology recs

## 2020-05-21 NOTE — PROGRESS NOTE ADULT - ASSESSMENT
A/p  61 y/o M with PMH of HTN, DM complicated by neuropathy, IVDU, ETOH liver cirrhosis admitted for toxic megacolon and pneumatosis and SARS-CoV-2 infection, underwent ex lap and subtotal colectomy with ileostomy creation, nephrology consulted for non oliguric MEG attributed to ATN which improved significantly and plateaued with Cr ranging 1.5-2, he has been having Hyponatremia throughout his hosp stay which has been stable in low 130s however worsened again over the past week from 132 ---> 123

## 2020-05-21 NOTE — PROGRESS NOTE ADULT - PROBLEM SELECTOR PLAN 5
normocytic anemia likely 2/2 chronic disease + kidney dysfunction vs less likely acute blood loss  -Hgb downtrended to 6.6 on 5/5, s/p unit PRBC on 5/5, H/H now stable at 8.3/25.7  -s/p 7U pRBCs total this admission as of 5/5/20  -initial concern for GI source, however patient refused further workup and GI states capsule endoscopy no longer indicated; ostomy output has remained non-bloody  -hemodynamically stable; low concern for active bleed  -monitor hb, transfuse <7  -pantoprazole 40mg BID    #Hyponatremia in setting of cirrhosis  -salt tabs 1g tid

## 2020-05-21 NOTE — PROGRESS NOTE ADULT - PROBLEM SELECTOR PLAN 6
home regimen: gabapentin 1200mg TID (confirmed with pharmacy), which is well over daily limit for gabapentin use  -gabapentin 300 mg TID; monitor renal function as above

## 2020-05-21 NOTE — PROGRESS NOTE ADULT - SUBJECTIVE AND OBJECTIVE BOX
O/N Events:  LUCA On  Subjective:  overall pic unchanged, discharge on hold as covid test remained +, hyponatremia worsened to 123/ Urine lytes not obtained   developed acidosis with bicarb drop to 14 from 22, renal fx unchanged     VITALS  Vital Signs Last 24 Hrs  T(C): 36.4 (21 May 2020 05:37), Max: 36.7 (20 May 2020 20:49)  T(F): 97.5 (21 May 2020 05:37), Max: 98 (20 May 2020 20:49)  HR: 70 (21 May 2020 05:37) (65 - 70)  BP: 130/71 (21 May 2020 05:37) (130/71 - 154/77)  RR: 18 (21 May 2020 05:37) (18 - 19)  SpO2: 98% (21 May 2020 05:37) (98% - 100%)    PHYSICAL EXAM  Deferred per isolation protocol     MEDICATIONS  (STANDING):  amLODIPine   Tablet 10 milliGRAM(s) Oral daily  AQUAPHOR (petrolatum Ointment) 1 Application(s) Topical two times a day  atorvastatin 10 milliGRAM(s) Oral at bedtime  baclofen 2.5 milliGRAM(s) Oral every 12 hours  brimonidine 0.2% Ophthalmic Solution 1 Drop(s) Both EYES three times a day  chlorhexidine 2% Cloths 1 Application(s) Topical daily  dextrose 5%. 1000 milliLiter(s) (50 mL/Hr) IV Continuous <Continuous>  dextrose 50% Injectable 12.5 Gram(s) IV Push once  dextrose 50% Injectable 25 Gram(s) IV Push once  dextrose 50% Injectable 25 Gram(s) IV Push once  diphenoxylate/atropine 1 Tablet(s) Oral daily  dorzolamide 2% Ophthalmic Solution 1 Drop(s) Both EYES three times a day  furosemide    Tablet 40 milliGRAM(s) Oral every 24 hours  gabapentin 300 milliGRAM(s) Oral every 8 hours  heparin   Injectable 7500 Unit(s) SubCutaneous every 8 hours  insulin glargine Injectable (LANTUS) 14 Unit(s) SubCutaneous at bedtime  insulin lispro (HumaLOG) corrective regimen sliding scale   SubCutaneous Before meals and at bedtime  insulin lispro Injectable (HumaLOG) 8 Unit(s) SubCutaneous three times a day before meals  metFORMIN 1000 milliGRAM(s) Oral two times a day with meals  pantoprazole    Tablet 40 milliGRAM(s) Oral every 12 hours  rifAXIMin 550 milliGRAM(s) Oral two times a day  sodium bicarbonate 650 milliGRAM(s) Oral three times a day  sodium chloride 1 Gram(s) Oral three times a day  spironolactone 100 milliGRAM(s) Oral daily  traZODone 100 milliGRAM(s) Oral at bedtime    MEDICATIONS  (PRN):  acetaminophen   Tablet .. 500 milliGRAM(s) Oral every 6 hours PRN Mild Pain (1 - 3), Moderate Pain (4 - 6)  dextrose 40% Gel 15 Gram(s) Oral once PRN Blood Glucose LESS THAN 70 milliGRAM(s)/deciliter  glucagon  Injectable 1 milliGRAM(s) IntraMuscular once PRN Glucose LESS THAN 70 milligrams/deciliter  ondansetron    Tablet 4 milliGRAM(s) Oral every 8 hours PRN Nausea and/or Vomiting    LABS    05-21    123<L>  |  92<L>  |  63<H>  ----------------------------<  261<H>  see note   |  14<L>  |  1.73<H>    Ca    10.0      21 May 2020 10:25  Phos  5.3     05-21  Mg     1.4     05-21

## 2020-05-21 NOTE — PROGRESS NOTE ADULT - PROBLEM SELECTOR PLAN 7
likely multifactorial; monitor BMP on water restriction; # worsening, started salt tabs per Renal recs

## 2020-05-22 LAB
ANION GAP SERPL CALC-SCNC: 16 MMOL/L — SIGNIFICANT CHANGE UP (ref 5–17)
BUN SERPL-MCNC: 60 MG/DL — HIGH (ref 7–23)
CALCIUM SERPL-MCNC: 10.2 MG/DL — SIGNIFICANT CHANGE UP (ref 8.4–10.5)
CHLORIDE SERPL-SCNC: 92 MMOL/L — LOW (ref 96–108)
CO2 SERPL-SCNC: 19 MMOL/L — LOW (ref 22–31)
CREAT SERPL-MCNC: 1.72 MG/DL — HIGH (ref 0.5–1.3)
GLUCOSE BLDC GLUCOMTR-MCNC: 104 MG/DL — HIGH (ref 70–99)
GLUCOSE BLDC GLUCOMTR-MCNC: 150 MG/DL — HIGH (ref 70–99)
GLUCOSE BLDC GLUCOMTR-MCNC: 177 MG/DL — HIGH (ref 70–99)
GLUCOSE BLDC GLUCOMTR-MCNC: 196 MG/DL — HIGH (ref 70–99)
GLUCOSE SERPL-MCNC: 194 MG/DL — HIGH (ref 70–99)
MAGNESIUM SERPL-MCNC: 1.5 MG/DL — LOW (ref 1.6–2.6)
OSMOLALITY UR: 250 MOSMOL/KG — SIGNIFICANT CHANGE UP (ref 100–650)
OSMOLALITY UR: 265 MOSMOL/KG — SIGNIFICANT CHANGE UP (ref 100–650)
PHOSPHATE SERPL-MCNC: 4.8 MG/DL — HIGH (ref 2.5–4.5)
POTASSIUM SERPL-MCNC: 5 MMOL/L — SIGNIFICANT CHANGE UP (ref 3.5–5.3)
POTASSIUM SERPL-SCNC: 5 MMOL/L — SIGNIFICANT CHANGE UP (ref 3.5–5.3)
SODIUM SERPL-SCNC: 127 MMOL/L — LOW (ref 135–145)
SODIUM UR-SCNC: 28 MMOL/L — SIGNIFICANT CHANGE UP
SODIUM UR-SCNC: 41 MMOL/L — SIGNIFICANT CHANGE UP

## 2020-05-22 PROCEDURE — 99232 SBSQ HOSP IP/OBS MODERATE 35: CPT

## 2020-05-22 PROCEDURE — 99231 SBSQ HOSP IP/OBS SF/LOW 25: CPT | Mod: GC

## 2020-05-22 PROCEDURE — 99232 SBSQ HOSP IP/OBS MODERATE 35: CPT | Mod: GC

## 2020-05-22 RX ORDER — MAGNESIUM SULFATE 500 MG/ML
2 VIAL (ML) INJECTION ONCE
Refills: 0 | Status: COMPLETED | OUTPATIENT
Start: 2020-05-22 | End: 2020-05-22

## 2020-05-22 RX ORDER — SODIUM CHLORIDE 9 MG/ML
500 INJECTION INTRAMUSCULAR; INTRAVENOUS; SUBCUTANEOUS ONCE
Refills: 0 | Status: COMPLETED | OUTPATIENT
Start: 2020-05-22 | End: 2020-05-22

## 2020-05-22 RX ADMIN — METFORMIN HYDROCHLORIDE 1000 MILLIGRAM(S): 850 TABLET ORAL at 09:16

## 2020-05-22 RX ADMIN — GABAPENTIN 300 MILLIGRAM(S): 400 CAPSULE ORAL at 05:35

## 2020-05-22 RX ADMIN — DORZOLAMIDE HYDROCHLORIDE 1 DROP(S): 20 SOLUTION/ DROPS OPHTHALMIC at 21:02

## 2020-05-22 RX ADMIN — GABAPENTIN 300 MILLIGRAM(S): 400 CAPSULE ORAL at 21:07

## 2020-05-22 RX ADMIN — Medication 650 MILLIGRAM(S): at 21:08

## 2020-05-22 RX ADMIN — SPIRONOLACTONE 100 MILLIGRAM(S): 25 TABLET, FILM COATED ORAL at 05:37

## 2020-05-22 RX ADMIN — BRIMONIDINE TARTRATE 1 DROP(S): 2 SOLUTION/ DROPS OPHTHALMIC at 12:27

## 2020-05-22 RX ADMIN — Medication 1 APPLICATION(S): at 05:33

## 2020-05-22 RX ADMIN — HEPARIN SODIUM 7500 UNIT(S): 5000 INJECTION INTRAVENOUS; SUBCUTANEOUS at 21:07

## 2020-05-22 RX ADMIN — Medication 2: at 09:00

## 2020-05-22 RX ADMIN — BRIMONIDINE TARTRATE 1 DROP(S): 2 SOLUTION/ DROPS OPHTHALMIC at 05:33

## 2020-05-22 RX ADMIN — BRIMONIDINE TARTRATE 1 DROP(S): 2 SOLUTION/ DROPS OPHTHALMIC at 21:02

## 2020-05-22 RX ADMIN — Medication 100 MILLIGRAM(S): at 21:07

## 2020-05-22 RX ADMIN — PANTOPRAZOLE SODIUM 40 MILLIGRAM(S): 20 TABLET, DELAYED RELEASE ORAL at 05:35

## 2020-05-22 RX ADMIN — Medication 8 UNIT(S): at 09:16

## 2020-05-22 RX ADMIN — SODIUM CHLORIDE 100 MILLILITER(S): 9 INJECTION INTRAMUSCULAR; INTRAVENOUS; SUBCUTANEOUS at 17:15

## 2020-05-22 RX ADMIN — Medication 50 GRAM(S): at 14:16

## 2020-05-22 RX ADMIN — INSULIN GLARGINE 16 UNIT(S): 100 INJECTION, SOLUTION SUBCUTANEOUS at 21:50

## 2020-05-22 RX ADMIN — Medication 650 MILLIGRAM(S): at 12:01

## 2020-05-22 RX ADMIN — Medication 2: at 21:51

## 2020-05-22 RX ADMIN — SODIUM CHLORIDE 1 GRAM(S): 9 INJECTION INTRAMUSCULAR; INTRAVENOUS; SUBCUTANEOUS at 05:38

## 2020-05-22 RX ADMIN — DORZOLAMIDE HYDROCHLORIDE 1 DROP(S): 20 SOLUTION/ DROPS OPHTHALMIC at 12:27

## 2020-05-22 RX ADMIN — Medication 8 UNIT(S): at 17:13

## 2020-05-22 RX ADMIN — Medication 500 MILLIGRAM(S): at 15:45

## 2020-05-22 RX ADMIN — SODIUM CHLORIDE 1 GRAM(S): 9 INJECTION INTRAMUSCULAR; INTRAVENOUS; SUBCUTANEOUS at 12:01

## 2020-05-22 RX ADMIN — METFORMIN HYDROCHLORIDE 1000 MILLIGRAM(S): 850 TABLET ORAL at 17:12

## 2020-05-22 RX ADMIN — Medication 8 UNIT(S): at 12:02

## 2020-05-22 RX ADMIN — Medication 1 TABLET(S): at 12:01

## 2020-05-22 RX ADMIN — Medication 2: at 17:14

## 2020-05-22 RX ADMIN — Medication 500 MILLIGRAM(S): at 22:21

## 2020-05-22 RX ADMIN — GABAPENTIN 300 MILLIGRAM(S): 400 CAPSULE ORAL at 12:01

## 2020-05-22 RX ADMIN — Medication 2.5 MILLIGRAM(S): at 17:12

## 2020-05-22 RX ADMIN — HEPARIN SODIUM 7500 UNIT(S): 5000 INJECTION INTRAVENOUS; SUBCUTANEOUS at 12:01

## 2020-05-22 RX ADMIN — Medication 2.5 MILLIGRAM(S): at 05:36

## 2020-05-22 RX ADMIN — PANTOPRAZOLE SODIUM 40 MILLIGRAM(S): 20 TABLET, DELAYED RELEASE ORAL at 17:13

## 2020-05-22 RX ADMIN — SODIUM CHLORIDE 1 GRAM(S): 9 INJECTION INTRAMUSCULAR; INTRAVENOUS; SUBCUTANEOUS at 21:08

## 2020-05-22 RX ADMIN — Medication 650 MILLIGRAM(S): at 05:35

## 2020-05-22 RX ADMIN — ATORVASTATIN CALCIUM 10 MILLIGRAM(S): 80 TABLET, FILM COATED ORAL at 21:07

## 2020-05-22 RX ADMIN — Medication 40 MILLIGRAM(S): at 05:37

## 2020-05-22 RX ADMIN — DORZOLAMIDE HYDROCHLORIDE 1 DROP(S): 20 SOLUTION/ DROPS OPHTHALMIC at 05:33

## 2020-05-22 RX ADMIN — AMLODIPINE BESYLATE 10 MILLIGRAM(S): 2.5 TABLET ORAL at 05:37

## 2020-05-22 RX ADMIN — Medication 1 APPLICATION(S): at 21:02

## 2020-05-22 RX ADMIN — HEPARIN SODIUM 7500 UNIT(S): 5000 INJECTION INTRAVENOUS; SUBCUTANEOUS at 05:38

## 2020-05-22 NOTE — PROGRESS NOTE ADULT - SUBJECTIVE AND OBJECTIVE BOX
O/N Events:  LUCA  Subjective:  sNa ranging 127-130/ renal fx remained stable/ acidosis stable bicarb 19  urine lytes from yesterday suggestive of hypovolemic hypontremia   ostomy output not recorded     VITALS  Vital Signs Last 24 Hrs  T(C): 36.8 (22 May 2020 06:22), Max: 36.9 (21 May 2020 13:38)  T(F): 98.2 (22 May 2020 06:22), Max: 98.4 (21 May 2020 13:38)  HR: 61 (22 May 2020 06:22) (61 - 76)  BP: 142/66 (22 May 2020 06:22) (137/69 - 142/66)  RR: 16 (22 May 2020 06:22) (16 - 18)  SpO2: 99% (22 May 2020 06:22) (98% - 100%)    PHYSICAL EXAM  deferred per isolation protocol     MEDICATIONS  (STANDING):  amLODIPine   Tablet 10 milliGRAM(s) Oral daily  AQUAPHOR (petrolatum Ointment) 1 Application(s) Topical two times a day  atorvastatin 10 milliGRAM(s) Oral at bedtime  baclofen 2.5 milliGRAM(s) Oral every 12 hours  brimonidine 0.2% Ophthalmic Solution 1 Drop(s) Both EYES three times a day  chlorhexidine 2% Cloths 1 Application(s) Topical daily  dextrose 5%. 1000 milliLiter(s) (50 mL/Hr) IV Continuous <Continuous>  dextrose 50% Injectable 12.5 Gram(s) IV Push once  dextrose 50% Injectable 25 Gram(s) IV Push once  dextrose 50% Injectable 25 Gram(s) IV Push once  diphenoxylate/atropine 1 Tablet(s) Oral daily  dorzolamide 2% Ophthalmic Solution 1 Drop(s) Both EYES three times a day  furosemide    Tablet 40 milliGRAM(s) Oral every 24 hours  gabapentin 300 milliGRAM(s) Oral every 8 hours  heparin   Injectable 7500 Unit(s) SubCutaneous every 8 hours  insulin glargine Injectable (LANTUS) 16 Unit(s) SubCutaneous at bedtime  insulin lispro (HumaLOG) corrective regimen sliding scale   SubCutaneous Before meals and at bedtime  insulin lispro Injectable (HumaLOG) 8 Unit(s) SubCutaneous three times a day before meals  metFORMIN 1000 milliGRAM(s) Oral two times a day with meals  pantoprazole    Tablet 40 milliGRAM(s) Oral every 12 hours  rifAXIMin 550 milliGRAM(s) Oral two times a day  sodium bicarbonate 650 milliGRAM(s) Oral three times a day  sodium chloride 1 Gram(s) Oral three times a day  spironolactone 100 milliGRAM(s) Oral daily  traZODone 100 milliGRAM(s) Oral at bedtime    MEDICATIONS  (PRN):  acetaminophen   Tablet .. 500 milliGRAM(s) Oral every 6 hours PRN Mild Pain (1 - 3), Moderate Pain (4 - 6)  dextrose 40% Gel 15 Gram(s) Oral once PRN Blood Glucose LESS THAN 70 milliGRAM(s)/deciliter  glucagon  Injectable 1 milliGRAM(s) IntraMuscular once PRN Glucose LESS THAN 70 milligrams/deciliter  ondansetron    Tablet 4 milliGRAM(s) Oral every 8 hours PRN Nausea and/or Vomiting      LABS    05-22    127<L>  |  92<L>  |  60<H>  ----------------------------<  194<H>  5.0   |  19<L>  |  1.72<H>    Ca    10.2      22 May 2020 10:1Phos  4.8     05-22  Mg     1.5     05-22    TPro  x   /  Alb  4.1  /  TBili  x   /  DBili  x   /  AST  x   /  ALT  x   /  AlkPhos  x   05-21    LIVER FUNCTIONS - ( 21 May 2020 13:53 )  Alb: 4.1 g/dL / Pro: x     / ALK PHOS: x     / ALT: x     / AST: x     / GGT: x O/N Events:  LUCA  Subjective:  sNa ranging 127-130/ renal fx remained stable/ acidosis stable bicarb 19  urine lytes from yesterday suggestive of hypovolemic hypontremia   ostomy output not recorded/ per nurse bag has not been changed since yesterday, Mg low 1.5     VITALS  Vital Signs Last 24 Hrs  T(C): 36.8 (22 May 2020 06:22), Max: 36.9 (21 May 2020 13:38)  T(F): 98.2 (22 May 2020 06:22), Max: 98.4 (21 May 2020 13:38)  HR: 61 (22 May 2020 06:22) (61 - 76)  BP: 142/66 (22 May 2020 06:22) (137/69 - 142/66)  RR: 16 (22 May 2020 06:22) (16 - 18)  SpO2: 99% (22 May 2020 06:22) (98% - 100%)    PHYSICAL EXAM  deferred per isolation protocol     MEDICATIONS  (STANDING):  amLODIPine   Tablet 10 milliGRAM(s) Oral daily  AQUAPHOR (petrolatum Ointment) 1 Application(s) Topical two times a day  atorvastatin 10 milliGRAM(s) Oral at bedtime  baclofen 2.5 milliGRAM(s) Oral every 12 hours  brimonidine 0.2% Ophthalmic Solution 1 Drop(s) Both EYES three times a day  chlorhexidine 2% Cloths 1 Application(s) Topical daily  dextrose 5%. 1000 milliLiter(s) (50 mL/Hr) IV Continuous <Continuous>  dextrose 50% Injectable 12.5 Gram(s) IV Push once  dextrose 50% Injectable 25 Gram(s) IV Push once  dextrose 50% Injectable 25 Gram(s) IV Push once  diphenoxylate/atropine 1 Tablet(s) Oral daily  dorzolamide 2% Ophthalmic Solution 1 Drop(s) Both EYES three times a day  furosemide    Tablet 40 milliGRAM(s) Oral every 24 hours  gabapentin 300 milliGRAM(s) Oral every 8 hours  heparin   Injectable 7500 Unit(s) SubCutaneous every 8 hours  insulin glargine Injectable (LANTUS) 16 Unit(s) SubCutaneous at bedtime  insulin lispro (HumaLOG) corrective regimen sliding scale   SubCutaneous Before meals and at bedtime  insulin lispro Injectable (HumaLOG) 8 Unit(s) SubCutaneous three times a day before meals  metFORMIN 1000 milliGRAM(s) Oral two times a day with meals  pantoprazole    Tablet 40 milliGRAM(s) Oral every 12 hours  rifAXIMin 550 milliGRAM(s) Oral two times a day  sodium bicarbonate 650 milliGRAM(s) Oral three times a day  sodium chloride 1 Gram(s) Oral three times a day  spironolactone 100 milliGRAM(s) Oral daily  traZODone 100 milliGRAM(s) Oral at bedtime    MEDICATIONS  (PRN):  acetaminophen   Tablet .. 500 milliGRAM(s) Oral every 6 hours PRN Mild Pain (1 - 3), Moderate Pain (4 - 6)  dextrose 40% Gel 15 Gram(s) Oral once PRN Blood Glucose LESS THAN 70 milliGRAM(s)/deciliter  glucagon  Injectable 1 milliGRAM(s) IntraMuscular once PRN Glucose LESS THAN 70 milligrams/deciliter  ondansetron    Tablet 4 milliGRAM(s) Oral every 8 hours PRN Nausea and/or Vomiting      LABS    05-22    127<L>  |  92<L>  |  60<H>  ----------------------------<  194<H>  5.0   |  19<L>  |  1.72<H>    Ca    10.2      22 May 2020 10:1Phos  4.8     05-22  Mg     1.5     05-22    TPro  x   /  Alb  4.1  /  TBili  x   /  DBili  x   /  AST  x   /  ALT  x   /  AlkPhos  x   05-21    LIVER FUNCTIONS - ( 21 May 2020 13:53 )  Alb: 4.1 g/dL / Pro: x     / ALK PHOS: x     / ALT: x     / AST: x     / GGT: x

## 2020-05-22 NOTE — ADVANCED PRACTICE NURSE CONSULT - ASSESSMENT
New 1 piece [place] appliance placed over 2 rings to prevent leakage. Currently stool is liquid but peristomal skin clear. Abdominal incisions healing well, superior incision still has opening in center, approx 0.4x0.3 cm but remainder of wound is granulating. Inferior incision is 100% granulation tissue.

## 2020-05-22 NOTE — PROGRESS NOTE ADULT - ASSESSMENT
60M PMH HTN, DM + neuropathy, IVDU, ETOH liver cirrhosis, presents to [facility] s/p fall at home; found with toxic megacolon and pneumatosis; now s/p ex lap + subtotal colectomy, ileostomy (3/31). Hospital course further complicated by COVID, MEG, anemia with suspicion for GI source/varices (refused further GI workup). Pt stable for discharge pending establishment of proper wound-care services.

## 2020-05-22 NOTE — PROGRESS NOTE ADULT - ATTENDING COMMENTS
Glucoses have decreased to the 100-150 range, though he was still up to 190 post supper last night.  Will leave the current regimen as is for now

## 2020-05-22 NOTE — PROGRESS NOTE ADULT - PROBLEM SELECTOR PLAN 5
cont. basal-bolus insulin + metformin, per Endocrine recs; cont. Neurontin for diabetic neuropathy; diabetic diet

## 2020-05-22 NOTE — PROGRESS NOTE ADULT - PROBLEM SELECTOR PLAN 4
nonoliguric MEG likely 2/2 pre-renal + intrinsic 2/2 COVID-19 + recent initiation/increase of Lasix  -avoid nephrotoxic agents  -consider gentle IV hydration if GI output increases  -monitor I/Os   -renal following - appreciate recs    #Non-anion gap metabolic acidosis   -sodium bicarbonate TID  -f/u further nephrology recs

## 2020-05-22 NOTE — PROGRESS NOTE ADULT - SUBJECTIVE AND OBJECTIVE BOX
INTERVAL HPI/OVERNIGHT EVENTS:      Patient is a 60y old  Male who presents with a chief complaint of toxic megacolon, COVID + (06 May 2020 10:01)  Spoke to the primary team taking care of the patient.   Patient seen at the bedside. Patient was very frustrated and angry that they are doing frequent blood draws and was not ready to involve nay conversation  No acute events.   Able to tolerate metformin 1000 BID- No GI disturbances  Saturating well on RA. appetite is good.   TSH was 2.389 and Free T4 0.84  Sodium was 123 in the morning ( hemolyzed sample). repeat sodium was 130 - on salt tabs and fluid restriction- being managed by nephro - Cr 1.89 and GFR 38    FSG & Insulin received:  Yesterday:  pre-dinner fs, 8 units lispro   bedtime fs, 16 lantus   units + 2 units lispro SS  Today:  pre-breakfast fs, 8 nutritional lispro   units  pre-lunch fsg:     Pt reports the following symptoms:  CONSTITUTIONAL:  Negative fever or chills, feels well, good appetite  CARDIOVASCULAR:  Negative for chest pain or palpitations  RESPIRATORY:  Negative for cough, wheezing, or SOB   GASTROINTESTINAL:  Negative for nausea, vomiting, diarrhea, constipation, or abdominal pain  NEUROLOGIC:  No headache, confusion, dizziness, lightheadedness        MEDICATIONS  (STANDING):  amLODIPine   Tablet 10 milliGRAM(s) Oral daily  AQUAPHOR (petrolatum Ointment) 1 Application(s) Topical two times a day  atorvastatin 10 milliGRAM(s) Oral at bedtime  baclofen 2.5 milliGRAM(s) Oral every 12 hours  brimonidine 0.2% Ophthalmic Solution 1 Drop(s) Both EYES three times a day  chlorhexidine 2% Cloths 1 Application(s) Topical daily  dextrose 5%. 1000 milliLiter(s) (50 mL/Hr) IV Continuous <Continuous>  dextrose 50% Injectable 12.5 Gram(s) IV Push once  dextrose 50% Injectable 25 Gram(s) IV Push once  dextrose 50% Injectable 25 Gram(s) IV Push once  diphenoxylate/atropine 1 Tablet(s) Oral daily  dorzolamide 2% Ophthalmic Solution 1 Drop(s) Both EYES three times a day  furosemide    Tablet 40 milliGRAM(s) Oral every 24 hours  gabapentin 300 milliGRAM(s) Oral every 8 hours  heparin   Injectable 7500 Unit(s) SubCutaneous every 8 hours  insulin glargine Injectable (LANTUS) 16 Unit(s) SubCutaneous at bedtime  insulin lispro (HumaLOG) corrective regimen sliding scale   SubCutaneous Before meals and at bedtime  insulin lispro Injectable (HumaLOG) 8 Unit(s) SubCutaneous three times a day before meals  metFORMIN 1000 milliGRAM(s) Oral two times a day with meals  pantoprazole    Tablet 40 milliGRAM(s) Oral every 12 hours  rifAXIMin 550 milliGRAM(s) Oral two times a day  sodium bicarbonate 650 milliGRAM(s) Oral three times a day  sodium chloride 1 Gram(s) Oral three times a day  spironolactone 100 milliGRAM(s) Oral daily  traZODone 100 milliGRAM(s) Oral at bedtime    MEDICATIONS  (PRN):  acetaminophen   Tablet .. 500 milliGRAM(s) Oral every 6 hours PRN Mild Pain (1 - 3), Moderate Pain (4 - 6)  dextrose 40% Gel 15 Gram(s) Oral once PRN Blood Glucose LESS THAN 70 milliGRAM(s)/deciliter  glucagon  Injectable 1 milliGRAM(s) IntraMuscular once PRN Glucose LESS THAN 70 milligrams/deciliter  ondansetron    Tablet 4 milliGRAM(s) Oral every 8 hours PRN Nausea and/or Vomiting      PHYSICAL EXAM  Vital Signs Last 24 Hrs  T(C): 36.8 (22 May 2020 06:22), Max: 36.9 (21 May 2020 13:38)  T(F): 98.2 (22 May 2020 06:22), Max: 98.4 (21 May 2020 13:38)  HR: 61 (22 May 2020 06:22) (61 - 76)  BP: 142/66 (22 May 2020 06:22) (137/69 - 142/66)  BP(mean): --  RR: 16 (22 May 2020 06:22) (16 - 18)  SpO2: 99% (22 May 2020 06:22) (98% - 100%)      Due to the nature of this patient’s COVID-19 isolation status (either confirmed or suspected), this note was prepared without a bedside physical examination to prevent spread of infection and to conserve personal protective equipment during this nationwide pandemic. If possible, direct patient communication occurred via electronic measures or telephone conversation. Examination highlights were provided by a bedside nurse wearing personal protective equipment and review of pertinent medical records. Objective data (vital signs, urine output, lab results, imaging studies, medications, etc) were reviewed in detail. Face to face visits and physical examination have been limited only to patients for whom it is required for medical decision making.    LABS:        130<L>  |  94<L>  |  58<H>  ----------------------------<  124<H>  5.1   |  18<L>  |  1.89<H>    Ca    9.8      21 May 2020 13:53  Phos  5.3       Mg     1.4         TPro  x   /  Alb  4.1  /  TBili  x   /  DBili  x   /  AST  x   /  ALT  x   /  AlkPhos  x           Thyroid Stimulating Hormone, Serum: 2.389 uIU/mL ( @ 07:38)  Thyroid Stimulating Hormone, Serum: 3.869 uIU/mL ( @ 06:50)  Thyroid Stimulating Hormone, Serum: 3.104 uIU/mL ( @ 06:10)  Thyroid Stimulating Hormone, Serum: 2.712 uIU/mL ( @ 06:31)  Thyroid Stimulating Hormone, Serum: 3.063 uIU/mL ( @ 06:31)  Thyroid Stimulating Hormone, Serum: 3.509 uIU/mL ( @ 06:07)  Thyroid Stimulating Hormone, Serum: 3.325 uIU/mL ( @ 06:17)  Thyroid Stimulating Hormone, Serum: 2.813 uIU/mL ( @ 07:28)  Thyroid Stimulating Hormone, Serum: 2.877 uIU/mL ( @ 00:34)      HbA1C: 6.9 % ( @ 06:59)  7.6 % ( @ 05:58)    CAPILLARY BLOOD GLUCOSE      POCT Blood Glucose.: 150 mg/dL (22 May 2020 08:04)  POCT Blood Glucose.: 191 mg/dL (21 May 2020 21:50)  POCT Blood Glucose.: 148 mg/dL (21 May 2020 16:53)  POCT Blood Glucose.: 100 mg/dL (21 May 2020 11:28)    A/P 60yMale with hx of DM now with COVID, worsening renal function, moderate hyperglycemia.     1.  DM: type 2  Please increase to Lantus 16 units at bedtime  PLease conitnue Lispro 8  units tid with meals.   Continue lispro moderate dose scale with meals and at bedtime.   Please continue metformin 1000 mg BID - with breakfast and dinner   Continue consistent carb diet  FSG Goal 100-180  GFR 40 and EF 65%    2.  Hyperlipidemia - goal LDL < 70  - on lipitor 10 mg once daily    3.  Obesity - outpatient medical management.      4.  Hypothyroidism   - TSH on admission 2.8.   - levothyroxine 50mcg discontinued on  to assess necessity as there is no evidence of autoimmune hypothyroidism at this time.   - NO levothyroxine at this time.   - Repeat TSH 3.32 and free T4 1.03 ().   - TSH 3.5 and Free T4 1.01 on 5/3   - TSh was 2.712 and free T4 was 0.98 on   TSh was 3.104 and free T4 was 0.86 on   TSh was 2.389 and Free T4 0.84 on     For discharge, TBD    Case seen and discussed with  and updated the primary team.  Pt can follow up at discharge with [facility] by calling  to make an appointment. INTERVAL HPI/OVERNIGHT EVENTS:      Patient is a 60y old  Male who presents with a chief complaint of toxic megacolon, COVID + (06 May 2020 10:01)  Spoke to the primary team taking care of the patient.   Was unable to reach pt by phone.  No acute events.   Able to tolerate metformin 1000 BID- No GI disturbances  Saturating well on RA. appetite is good.   TSH was 2.389 and Free T4 0.84  Sodium was 123 in the morning ( hemolyzed sample). repeat sodium was 130 - on salt tabs and fluid restriction- being managed by nephro - Cr 1.89 and GFR 38    FSG & Insulin received:  Yesterday:  pre-dinner fs, 8 units lispro   bedtime fs, 16 lantus   units + 2 units lispro SS  Today:  pre-breakfast fs, 8 nutritional lispro   units  pre-lunch fs    Pt reports the following symptoms:  CONSTITUTIONAL:  Negative fever or chills, feels well, good appetite  CARDIOVASCULAR:  Negative for chest pain or palpitations  RESPIRATORY:  Negative for cough, wheezing, or SOB   GASTROINTESTINAL:  Negative for nausea, vomiting, diarrhea, constipation, or abdominal pain  NEUROLOGIC:  No headache, confusion, dizziness, lightheadedness        MEDICATIONS  (STANDING):  amLODIPine   Tablet 10 milliGRAM(s) Oral daily  AQUAPHOR (petrolatum Ointment) 1 Application(s) Topical two times a day  atorvastatin 10 milliGRAM(s) Oral at bedtime  baclofen 2.5 milliGRAM(s) Oral every 12 hours  brimonidine 0.2% Ophthalmic Solution 1 Drop(s) Both EYES three times a day  chlorhexidine 2% Cloths 1 Application(s) Topical daily  dextrose 5%. 1000 milliLiter(s) (50 mL/Hr) IV Continuous <Continuous>  dextrose 50% Injectable 12.5 Gram(s) IV Push once  dextrose 50% Injectable 25 Gram(s) IV Push once  dextrose 50% Injectable 25 Gram(s) IV Push once  diphenoxylate/atropine 1 Tablet(s) Oral daily  dorzolamide 2% Ophthalmic Solution 1 Drop(s) Both EYES three times a day  furosemide    Tablet 40 milliGRAM(s) Oral every 24 hours  gabapentin 300 milliGRAM(s) Oral every 8 hours  heparin   Injectable 7500 Unit(s) SubCutaneous every 8 hours  insulin glargine Injectable (LANTUS) 16 Unit(s) SubCutaneous at bedtime  insulin lispro (HumaLOG) corrective regimen sliding scale   SubCutaneous Before meals and at bedtime  insulin lispro Injectable (HumaLOG) 8 Unit(s) SubCutaneous three times a day before meals  metFORMIN 1000 milliGRAM(s) Oral two times a day with meals  pantoprazole    Tablet 40 milliGRAM(s) Oral every 12 hours  rifAXIMin 550 milliGRAM(s) Oral two times a day  sodium bicarbonate 650 milliGRAM(s) Oral three times a day  sodium chloride 1 Gram(s) Oral three times a day  spironolactone 100 milliGRAM(s) Oral daily  traZODone 100 milliGRAM(s) Oral at bedtime    MEDICATIONS  (PRN):  acetaminophen   Tablet .. 500 milliGRAM(s) Oral every 6 hours PRN Mild Pain (1 - 3), Moderate Pain (4 - 6)  dextrose 40% Gel 15 Gram(s) Oral once PRN Blood Glucose LESS THAN 70 milliGRAM(s)/deciliter  glucagon  Injectable 1 milliGRAM(s) IntraMuscular once PRN Glucose LESS THAN 70 milligrams/deciliter  ondansetron    Tablet 4 milliGRAM(s) Oral every 8 hours PRN Nausea and/or Vomiting      PHYSICAL EXAM  Vital Signs Last 24 Hrs  T(C): 36.8 (22 May 2020 06:22), Max: 36.9 (21 May 2020 13:38)  T(F): 98.2 (22 May 2020 06:22), Max: 98.4 (21 May 2020 13:38)  HR: 61 (22 May 2020 06:22) (61 - 76)  BP: 142/66 (22 May 2020 06:22) (137/69 - 142/66)  BP(mean): --  RR: 16 (22 May 2020 06:22) (16 - 18)  SpO2: 99% (22 May 2020 06:22) (98% - 100%)      Due to the nature of this patient’s COVID-19 isolation status (either confirmed or suspected), this note was prepared without a bedside physical examination to prevent spread of infection and to conserve personal protective equipment during this nationwide pandemic. If possible, direct patient communication occurred via electronic measures or telephone conversation. Examination highlights were provided by a bedside nurse wearing personal protective equipment and review of pertinent medical records. Objective data (vital signs, urine output, lab results, imaging studies, medications, etc) were reviewed in detail. Face to face visits and physical examination have been limited only to patients for whom it is required for medical decision making.    LABS:        130<L>  |  94<L>  |  58<H>  ----------------------------<  124<H>  5.1   |  18<L>  |  1.89<H>    Ca    9.8      21 May 2020 13:53  Phos  5.3       Mg     1.4         TPro  x   /  Alb  4.1  /  TBili  x   /  DBili  x   /  AST  x   /  ALT  x   /  AlkPhos  x           Thyroid Stimulating Hormone, Serum: 2.389 uIU/mL ( @ 07:38)  Thyroid Stimulating Hormone, Serum: 3.869 uIU/mL ( @ 06:50)  Thyroid Stimulating Hormone, Serum: 3.104 uIU/mL ( @ 06:10)  Thyroid Stimulating Hormone, Serum: 2.712 uIU/mL ( @ 06:31)  Thyroid Stimulating Hormone, Serum: 3.063 uIU/mL ( @ 06:31)  Thyroid Stimulating Hormone, Serum: 3.509 uIU/mL ( @ 06:07)  Thyroid Stimulating Hormone, Serum: 3.325 uIU/mL ( @ 06:17)  Thyroid Stimulating Hormone, Serum: 2.813 uIU/mL ( @ 07:28)  Thyroid Stimulating Hormone, Serum: 2.877 uIU/mL ( @ 00:34)      HbA1C: 6.9 % ( @ 06:59)  7.6 % ( @ 05:58)    CAPILLARY BLOOD GLUCOSE      POCT Blood Glucose.: 150 mg/dL (22 May 2020 08:04)  POCT Blood Glucose.: 191 mg/dL (21 May 2020 21:50)  POCT Blood Glucose.: 148 mg/dL (21 May 2020 16:53)  POCT Blood Glucose.: 100 mg/dL (21 May 2020 11:28)    A/P 60yMale with hx of DM now with COVID, worsening renal function, moderate hyperglycemia.     1.  DM: type 2  Please increase to Lantus 16 units at bedtime  PLease conitnue Lispro 8  units tid with meals.   Continue lispro moderate dose scale with meals and at bedtime.   Please continue metformin 1000 mg BID - with breakfast and dinner   Continue consistent carb diet  FSG Goal 100-180  GFR 40 and EF 65%    2.  Hyperlipidemia - goal LDL < 70  - on lipitor 10 mg once daily    3.  Obesity - outpatient medical management.      4.  Hypothyroidism   - TSH on admission 2.8.   - levothyroxine 50mcg discontinued on  to assess necessity as there is no evidence of autoimmune hypothyroidism at this time.   - NO levothyroxine at this time.   - Repeat TSH 3.32 and free T4 1.03 ().   - TSH 3.5 and Free T4 1.01 on 5/3   - TSh was 2.712 and free T4 was 0.98 on   TSh was 3.104 and free T4 was 0.86 on   TSh was 2.389 and Free T4 0.84 on     For discharge, TBD    Case seen and discussed with  and updated the primary team.  Pt can follow up at discharge with [facility] by calling  to make an appointment. INTERVAL HPI/OVERNIGHT EVENTS:      Patient is a 60y old  Male who presents with a chief complaint of toxic megacolon, COVID + (06 May 2020 10:01)  Spoke to the primary team taking care of the patient.   Was unable to reach pt by phone.  No acute events.   Able to tolerate metformin 1000 BID- No GI disturbances  Saturating well on RA. appetite is good.   TSH was 2.389 and Free T4 0.84  Sodium was 127 in the morning.     FSG & Insulin received:  Yesterday:  pre-dinner fs, 8 units lispro   bedtime fs, 16 lantus   units + 2 units lispro SS  Today:  pre-breakfast fs, 8 nutritional lispro   units  pre-lunch fs    Pt reports the following symptoms:  CONSTITUTIONAL:  Negative fever or chills, feels well, good appetite  CARDIOVASCULAR:  Negative for chest pain or palpitations  RESPIRATORY:  Negative for cough, wheezing, or SOB   GASTROINTESTINAL:  Negative for nausea, vomiting, diarrhea, constipation, or abdominal pain  NEUROLOGIC:  No headache, confusion, dizziness, lightheadedness        MEDICATIONS  (STANDING):  amLODIPine   Tablet 10 milliGRAM(s) Oral daily  AQUAPHOR (petrolatum Ointment) 1 Application(s) Topical two times a day  atorvastatin 10 milliGRAM(s) Oral at bedtime  baclofen 2.5 milliGRAM(s) Oral every 12 hours  brimonidine 0.2% Ophthalmic Solution 1 Drop(s) Both EYES three times a day  chlorhexidine 2% Cloths 1 Application(s) Topical daily  dextrose 5%. 1000 milliLiter(s) (50 mL/Hr) IV Continuous <Continuous>  dextrose 50% Injectable 12.5 Gram(s) IV Push once  dextrose 50% Injectable 25 Gram(s) IV Push once  dextrose 50% Injectable 25 Gram(s) IV Push once  diphenoxylate/atropine 1 Tablet(s) Oral daily  dorzolamide 2% Ophthalmic Solution 1 Drop(s) Both EYES three times a day  furosemide    Tablet 40 milliGRAM(s) Oral every 24 hours  gabapentin 300 milliGRAM(s) Oral every 8 hours  heparin   Injectable 7500 Unit(s) SubCutaneous every 8 hours  insulin glargine Injectable (LANTUS) 16 Unit(s) SubCutaneous at bedtime  insulin lispro (HumaLOG) corrective regimen sliding scale   SubCutaneous Before meals and at bedtime  insulin lispro Injectable (HumaLOG) 8 Unit(s) SubCutaneous three times a day before meals  metFORMIN 1000 milliGRAM(s) Oral two times a day with meals  pantoprazole    Tablet 40 milliGRAM(s) Oral every 12 hours  rifAXIMin 550 milliGRAM(s) Oral two times a day  sodium bicarbonate 650 milliGRAM(s) Oral three times a day  sodium chloride 1 Gram(s) Oral three times a day  spironolactone 100 milliGRAM(s) Oral daily  traZODone 100 milliGRAM(s) Oral at bedtime    MEDICATIONS  (PRN):  acetaminophen   Tablet .. 500 milliGRAM(s) Oral every 6 hours PRN Mild Pain (1 - 3), Moderate Pain (4 - 6)  dextrose 40% Gel 15 Gram(s) Oral once PRN Blood Glucose LESS THAN 70 milliGRAM(s)/deciliter  glucagon  Injectable 1 milliGRAM(s) IntraMuscular once PRN Glucose LESS THAN 70 milligrams/deciliter  ondansetron    Tablet 4 milliGRAM(s) Oral every 8 hours PRN Nausea and/or Vomiting      PHYSICAL EXAM  Vital Signs Last 24 Hrs  T(C): 36.8 (22 May 2020 06:22), Max: 36.9 (21 May 2020 13:38)  T(F): 98.2 (22 May 2020 06:22), Max: 98.4 (21 May 2020 13:38)  HR: 61 (22 May 2020 06:22) (61 - 76)  BP: 142/66 (22 May 2020 06:22) (137/69 - 142/66)  BP(mean): --  RR: 16 (22 May 2020 06:22) (16 - 18)  SpO2: 99% (22 May 2020 06:22) (98% - 100%)      Due to the nature of this patient’s COVID-19 isolation status (either confirmed or suspected), this note was prepared without a bedside physical examination to prevent spread of infection and to conserve personal protective equipment during this nationwide pandemic. If possible, direct patient communication occurred via electronic measures or telephone conversation. Examination highlights were provided by a bedside nurse wearing personal protective equipment and review of pertinent medical records. Objective data (vital signs, urine output, lab results, imaging studies, medications, etc) were reviewed in detail. Face to face visits and physical examination have been limited only to patients for whom it is required for medical decision making.    LABS:        130<L>  |  94<L>  |  58<H>  ----------------------------<  124<H>  5.1   |  18<L>  |  1.89<H>    Ca    9.8      21 May 2020 13:53  Phos  5.3       Mg     1.4         TPro  x   /  Alb  4.1  /  TBili  x   /  DBili  x   /  AST  x   /  ALT  x   /  AlkPhos  x           Thyroid Stimulating Hormone, Serum: 2.389 uIU/mL ( @ 07:38)  Thyroid Stimulating Hormone, Serum: 3.869 uIU/mL ( @ 06:50)  Thyroid Stimulating Hormone, Serum: 3.104 uIU/mL ( @ 06:10)  Thyroid Stimulating Hormone, Serum: 2.712 uIU/mL ( @ 06:31)  Thyroid Stimulating Hormone, Serum: 3.063 uIU/mL ( @ 06:31)  Thyroid Stimulating Hormone, Serum: 3.509 uIU/mL ( @ 06:07)  Thyroid Stimulating Hormone, Serum: 3.325 uIU/mL ( @ 06:17)  Thyroid Stimulating Hormone, Serum: 2.813 uIU/mL ( @ 07:28)  Thyroid Stimulating Hormone, Serum: 2.877 uIU/mL ( @ 00:34)      HbA1C: 6.9 % ( @ 06:59)  7.6 % ( @ 05:58)    CAPILLARY BLOOD GLUCOSE      POCT Blood Glucose.: 150 mg/dL (22 May 2020 08:04)  POCT Blood Glucose.: 191 mg/dL (21 May 2020 21:50)  POCT Blood Glucose.: 148 mg/dL (21 May 2020 16:53)  POCT Blood Glucose.: 100 mg/dL (21 May 2020 11:28)    A/P 60yMale with hx of DM now with COVID, worsening renal function, moderate hyperglycemia.     1.  DM: type 2  Please continue Lantus 16 units at bedtime  PLease continue Lispro 8  units tid with meals.   Continue lispro moderate dose scale with meals and at bedtime.   Please continue metformin 1000 mg BID - with breakfast and dinner   Continue consistent carb diet  FSG Goal 100-180  GFR 40 and EF 65%    2.  Hyperlipidemia - goal LDL < 70  - on lipitor 10 mg once daily    3.  Obesity - outpatient medical management.      4.  Hypothyroidism   - TSH on admission 2.8.   - levothyroxine 50mcg discontinued on  to assess necessity as there is no evidence of autoimmune hypothyroidism at this time.   - NO levothyroxine at this time.   - Repeat TSH 3.32 and free T4 1.03 ().   - TSH 3.5 and Free T4 1.01 on 5/3   - TSh was 2.712 and free T4 was 0.98 on   TSh was 3.104 and free T4 was 0.86 on   TSh was 2.389 and Free T4 0.84 on     For discharge, TBD    Case seen and discussed with  and updated the primary team.  Pt can follow up at discharge with [facility] by calling  to make an appointment.

## 2020-05-22 NOTE — PROGRESS NOTE ADULT - ASSESSMENT
A/p  Patient with Hx IVDU, ETOH liver cirrhosis admitted for toxic megacolon, pneumatosis and SARS-CoV-2 infection, underwent ex lap and subtotal colectomy with ileostomy creation, nephrology initially consulted for non oliguric MEG attributed to ATN which improved significantly and plateaued with Cr ranging 1.5-2, he has been having Hyponatremia throughout his hosp stay possibly related to cirrhosis currently working on establishing optimal diuretic dosages along with fluid restriction and salt tabs possibly drinking more than he is supposed to

## 2020-05-22 NOTE — PROGRESS NOTE ADULT - ATTENDING COMMENTS
persistent hyponatremia, azotemia, metabolic acidosis  Na 127-   needs repeat U Na and Uosm  okay to c/w present lasix/salt tab regimen, but reminded he needs to limit his water intake persistent hyponatremia, azotemia, metabolic acidosis  Na 127-  with Low Deloris  add .9NS fluids- 500ml over several hours this afternoon  already received diuretics today- can hold tomorrow  will reassess in AM  c/w salt tabs

## 2020-05-22 NOTE — PROGRESS NOTE ADULT - PROBLEM SELECTOR PLAN 1
Acceptable range 127-130  - please repeat urina Na and Osm and c/w same regimen of lasix/aldactone, 1 L fluid restriction and 1 g TID salt tabs for now, would adjust recs based on Urine studies   will follow Acceptable range 127-130  - please repeat urina Na and Osm and c/w same regimen of lasix/aldactone, 1 L fluid restriction and 1 g TID salt tabs for now, would adjust recs based on Urine studies  - please replete Mg    will follow Acceptable range 127-130, urine lytes suggestive of hypovolemic hyponatremia given pretty compensated cirrhosis at present and acceptable volume status would consider to stop diuretics for now, plz administer 500 cc NS  - please replete Mg    will follow

## 2020-05-22 NOTE — PROGRESS NOTE ADULT - PROBLEM SELECTOR PLAN 7
Type 2 DM, endocrinology following   -lantus 14U qhs  -lispro 6U TID pre-meal   -mISS  -metformin 1000mg bid  -per endo, metformin to be increased to 1000mg bid however pharmacy would not approve as exceeds maximum dosage inpatient  -continue to appreciate endo recs    #hypothyroidism   -TSH 2.813; TPO and antithyroid Abs neg   -levothyroxine 50mcg discontinued per endocrinology 4/29 given low concern for autoimmune hypothyroidism at this time; will assess necessity of continuation of levothyroxine  -continue to appreciate endocrine recs Type 2 DM, endocrinology following   -lantus 16U qhs  -lispro 6U TID pre-meal   -mISS  -metformin 1000mg bid  -per endo, metformin to be increased to 1000mg bid however pharmacy would not approve as exceeds maximum dosage inpatient  -continue to appreciate endo recs    #hypothyroidism   -TSH 2.813; TPO and antithyroid Abs neg   -levothyroxine 50mcg discontinued per endocrinology 4/29 given low concern for autoimmune hypothyroidism at this time; will assess necessity of continuation of levothyroxine  -continue to appreciate endocrine recs

## 2020-05-22 NOTE — PROGRESS NOTE ADULT - PROBLEM SELECTOR PLAN 5
normocytic anemia likely 2/2 chronic disease + kidney dysfunction vs less likely acute blood loss  -Hgb downtrended to 6.6 on 5/5, s/p unit PRBC on 5/5, H/H now stable at 8.3/25.7  -s/p 7U pRBCs total this admission as of 5/5/20  -initial concern for GI source, however patient refused further workup and GI states capsule endoscopy no longer indicated; ostomy output has remained non-bloody  -hemodynamically stable; low concern for active bleed  -monitor hb, transfuse <7  -pantoprazole 40mg BID    #Hyponatremia in setting of cirrhosis  -salt tabs 1g tid  -monitor BMP

## 2020-05-22 NOTE — PROGRESS NOTE ADULT - SUBJECTIVE AND OBJECTIVE BOX
OVERNIGHT EVENTS: No acute events overnight.  SUBJECTIVE: Patient seen and examined at the bedside. Patient denies new complaints at this time; 12-point ROS reviewed and is otherwise negative.    Vital Signs Last 12 Hrs  T(F): 98.2 (05-22-20 @ 06:22), Max: 98.2 (05-22-20 @ 06:22)  HR: 61 (05-22-20 @ 06:22) (61 - 71)  BP: 142/66 (05-22-20 @ 06:22) (137/69 - 142/66)  BP(mean): --  RR: 16 (05-22-20 @ 06:22) (16 - 18)  SpO2: 99% (05-22-20 @ 06:22) (99% - 100%)    I&O's Summary  21 May 2020 07:01  -  22 May 2020 07:00  --------------------------------------------------------  IN: 0 mL / OUT: 500 mL / NET: -500 mL      PHYSICAL EXAM:  General: NAD, resting comfortably in bed, AAOx3  HEENT: NCAT, EOMI, MMM  Neck: supple  Respiratory: CTA, normal respiratory rate/depth/effort  Cardiovascular: normal S1/S2, RRR, no MRG  Vascular: 2+ radial/DP pulses b/l  Abdomen: NT, soft, ileostomy output nonbloody; midline wound dressing c/d/i  Extremities: WWP, no peripheral edema      LABS:    05-21    130<L>  |  94<L>  |  58<H>  ----------------------------<  124<H>  5.1   |  18<L>  |  1.89<H>    Ca    9.8      21 May 2020 13:53  Phos  5.3     05-21  Mg     1.4     05-21    TPro  x   /  Alb  4.1  /  TBili  x   /  DBili  x   /  AST  x   /  ALT  x   /  AlkPhos  x   05-21          RADIOLOGY & ADDITIONAL TESTS: Reviewed    MEDICATIONS  (STANDING):  amLODIPine   Tablet 10 milliGRAM(s) Oral daily  AQUAPHOR (petrolatum Ointment) 1 Application(s) Topical two times a day  atorvastatin 10 milliGRAM(s) Oral at bedtime  baclofen 2.5 milliGRAM(s) Oral every 12 hours  brimonidine 0.2% Ophthalmic Solution 1 Drop(s) Both EYES three times a day  chlorhexidine 2% Cloths 1 Application(s) Topical daily  dextrose 5%. 1000 milliLiter(s) (50 mL/Hr) IV Continuous <Continuous>  dextrose 50% Injectable 12.5 Gram(s) IV Push once  dextrose 50% Injectable 25 Gram(s) IV Push once  dextrose 50% Injectable 25 Gram(s) IV Push once  diphenoxylate/atropine 1 Tablet(s) Oral daily  dorzolamide 2% Ophthalmic Solution 1 Drop(s) Both EYES three times a day  furosemide    Tablet 40 milliGRAM(s) Oral every 24 hours  gabapentin 300 milliGRAM(s) Oral every 8 hours  heparin   Injectable 7500 Unit(s) SubCutaneous every 8 hours  insulin glargine Injectable (LANTUS) 16 Unit(s) SubCutaneous at bedtime  insulin lispro (HumaLOG) corrective regimen sliding scale   SubCutaneous Before meals and at bedtime  insulin lispro Injectable (HumaLOG) 8 Unit(s) SubCutaneous three times a day before meals  metFORMIN 1000 milliGRAM(s) Oral two times a day with meals  pantoprazole    Tablet 40 milliGRAM(s) Oral every 12 hours  rifAXIMin 550 milliGRAM(s) Oral two times a day  sodium bicarbonate 650 milliGRAM(s) Oral three times a day  sodium chloride 1 Gram(s) Oral three times a day  spironolactone 100 milliGRAM(s) Oral daily  traZODone 100 milliGRAM(s) Oral at bedtime    MEDICATIONS  (PRN):  acetaminophen   Tablet .. 500 milliGRAM(s) Oral every 6 hours PRN Mild Pain (1 - 3), Moderate Pain (4 - 6)  dextrose 40% Gel 15 Gram(s) Oral once PRN Blood Glucose LESS THAN 70 milliGRAM(s)/deciliter  glucagon  Injectable 1 milliGRAM(s) IntraMuscular once PRN Glucose LESS THAN 70 milligrams/deciliter  ondansetron    Tablet 4 milliGRAM(s) Oral every 8 hours PRN Nausea and/or Vomiting

## 2020-05-23 DIAGNOSIS — N18.9 CHRONIC KIDNEY DISEASE, UNSPECIFIED: ICD-10-CM

## 2020-05-23 LAB
ANION GAP SERPL CALC-SCNC: 12 MMOL/L — SIGNIFICANT CHANGE UP (ref 5–17)
BUN SERPL-MCNC: 52 MG/DL — HIGH (ref 7–23)
CALCIUM SERPL-MCNC: 9.5 MG/DL — SIGNIFICANT CHANGE UP (ref 8.4–10.5)
CHLORIDE SERPL-SCNC: 93 MMOL/L — LOW (ref 96–108)
CO2 SERPL-SCNC: 23 MMOL/L — SIGNIFICANT CHANGE UP (ref 22–31)
CREAT SERPL-MCNC: 1.5 MG/DL — HIGH (ref 0.5–1.3)
GLUCOSE BLDC GLUCOMTR-MCNC: 117 MG/DL — HIGH (ref 70–99)
GLUCOSE BLDC GLUCOMTR-MCNC: 133 MG/DL — HIGH (ref 70–99)
GLUCOSE BLDC GLUCOMTR-MCNC: 183 MG/DL — HIGH (ref 70–99)
GLUCOSE BLDC GLUCOMTR-MCNC: 239 MG/DL — HIGH (ref 70–99)
GLUCOSE SERPL-MCNC: 208 MG/DL — HIGH (ref 70–99)
MAGNESIUM SERPL-MCNC: 1.8 MG/DL — SIGNIFICANT CHANGE UP (ref 1.6–2.6)
POTASSIUM SERPL-MCNC: 5.1 MMOL/L — SIGNIFICANT CHANGE UP (ref 3.5–5.3)
POTASSIUM SERPL-SCNC: 5.1 MMOL/L — SIGNIFICANT CHANGE UP (ref 3.5–5.3)
SARS-COV-2 RNA SPEC QL NAA+PROBE: SIGNIFICANT CHANGE UP
SODIUM SERPL-SCNC: 128 MMOL/L — LOW (ref 135–145)

## 2020-05-23 PROCEDURE — 99233 SBSQ HOSP IP/OBS HIGH 50: CPT | Mod: GC

## 2020-05-23 RX ORDER — MAGNESIUM SULFATE 500 MG/ML
1 VIAL (ML) INJECTION ONCE
Refills: 0 | Status: COMPLETED | OUTPATIENT
Start: 2020-05-23 | End: 2020-05-23

## 2020-05-23 RX ADMIN — BRIMONIDINE TARTRATE 1 DROP(S): 2 SOLUTION/ DROPS OPHTHALMIC at 14:37

## 2020-05-23 RX ADMIN — Medication 2: at 22:10

## 2020-05-23 RX ADMIN — HEPARIN SODIUM 7500 UNIT(S): 5000 INJECTION INTRAVENOUS; SUBCUTANEOUS at 22:09

## 2020-05-23 RX ADMIN — Medication 2.5 MILLIGRAM(S): at 05:23

## 2020-05-23 RX ADMIN — Medication 1 APPLICATION(S): at 10:12

## 2020-05-23 RX ADMIN — GABAPENTIN 300 MILLIGRAM(S): 400 CAPSULE ORAL at 05:22

## 2020-05-23 RX ADMIN — ATORVASTATIN CALCIUM 10 MILLIGRAM(S): 80 TABLET, FILM COATED ORAL at 22:08

## 2020-05-23 RX ADMIN — INSULIN GLARGINE 16 UNIT(S): 100 INJECTION, SOLUTION SUBCUTANEOUS at 22:09

## 2020-05-23 RX ADMIN — SODIUM CHLORIDE 1 GRAM(S): 9 INJECTION INTRAMUSCULAR; INTRAVENOUS; SUBCUTANEOUS at 12:22

## 2020-05-23 RX ADMIN — DORZOLAMIDE HYDROCHLORIDE 1 DROP(S): 20 SOLUTION/ DROPS OPHTHALMIC at 05:09

## 2020-05-23 RX ADMIN — HEPARIN SODIUM 7500 UNIT(S): 5000 INJECTION INTRAVENOUS; SUBCUTANEOUS at 12:22

## 2020-05-23 RX ADMIN — DORZOLAMIDE HYDROCHLORIDE 1 DROP(S): 20 SOLUTION/ DROPS OPHTHALMIC at 14:37

## 2020-05-23 RX ADMIN — Medication 8 UNIT(S): at 12:32

## 2020-05-23 RX ADMIN — Medication 1 APPLICATION(S): at 05:09

## 2020-05-23 RX ADMIN — Medication 8 UNIT(S): at 17:20

## 2020-05-23 RX ADMIN — Medication 100 MILLIGRAM(S): at 22:00

## 2020-05-23 RX ADMIN — AMLODIPINE BESYLATE 10 MILLIGRAM(S): 2.5 TABLET ORAL at 05:21

## 2020-05-23 RX ADMIN — HEPARIN SODIUM 7500 UNIT(S): 5000 INJECTION INTRAVENOUS; SUBCUTANEOUS at 05:22

## 2020-05-23 RX ADMIN — Medication 650 MILLIGRAM(S): at 12:22

## 2020-05-23 RX ADMIN — BRIMONIDINE TARTRATE 1 DROP(S): 2 SOLUTION/ DROPS OPHTHALMIC at 05:09

## 2020-05-23 RX ADMIN — METFORMIN HYDROCHLORIDE 1000 MILLIGRAM(S): 850 TABLET ORAL at 09:38

## 2020-05-23 RX ADMIN — Medication 2.5 MILLIGRAM(S): at 17:20

## 2020-05-23 RX ADMIN — PANTOPRAZOLE SODIUM 40 MILLIGRAM(S): 20 TABLET, DELAYED RELEASE ORAL at 05:22

## 2020-05-23 RX ADMIN — GABAPENTIN 300 MILLIGRAM(S): 400 CAPSULE ORAL at 12:22

## 2020-05-23 RX ADMIN — Medication 100 GRAM(S): at 12:23

## 2020-05-23 RX ADMIN — SODIUM CHLORIDE 1 GRAM(S): 9 INJECTION INTRAMUSCULAR; INTRAVENOUS; SUBCUTANEOUS at 22:08

## 2020-05-23 RX ADMIN — Medication 500 MILLIGRAM(S): at 17:36

## 2020-05-23 RX ADMIN — CHLORHEXIDINE GLUCONATE 1 APPLICATION(S): 213 SOLUTION TOPICAL at 12:31

## 2020-05-23 RX ADMIN — DORZOLAMIDE HYDROCHLORIDE 1 DROP(S): 20 SOLUTION/ DROPS OPHTHALMIC at 22:10

## 2020-05-23 RX ADMIN — Medication 4: at 09:39

## 2020-05-23 RX ADMIN — SODIUM CHLORIDE 1 GRAM(S): 9 INJECTION INTRAMUSCULAR; INTRAVENOUS; SUBCUTANEOUS at 05:22

## 2020-05-23 RX ADMIN — Medication 1 TABLET(S): at 12:22

## 2020-05-23 RX ADMIN — PANTOPRAZOLE SODIUM 40 MILLIGRAM(S): 20 TABLET, DELAYED RELEASE ORAL at 17:19

## 2020-05-23 RX ADMIN — BRIMONIDINE TARTRATE 1 DROP(S): 2 SOLUTION/ DROPS OPHTHALMIC at 22:11

## 2020-05-23 RX ADMIN — Medication 650 MILLIGRAM(S): at 22:00

## 2020-05-23 RX ADMIN — METFORMIN HYDROCHLORIDE 1000 MILLIGRAM(S): 850 TABLET ORAL at 17:19

## 2020-05-23 RX ADMIN — GABAPENTIN 300 MILLIGRAM(S): 400 CAPSULE ORAL at 22:08

## 2020-05-23 RX ADMIN — Medication 650 MILLIGRAM(S): at 05:21

## 2020-05-23 RX ADMIN — Medication 8 UNIT(S): at 09:39

## 2020-05-23 NOTE — PROGRESS NOTE ADULT - ASSESSMENT
59 yo male with Hx IVDU, ETOH liver cirrhosis admitted for toxic megacolon, pneumatosis and SARS-CoV-2 infection, underwent ex lap and subtotal colectomy with ileostomy creation,  orignally followed for MEG which resolved  now following for hyponatremia and proper diuretic dosing

## 2020-05-23 NOTE — PROGRESS NOTE ADULT - SUBJECTIVE AND OBJECTIVE BOX
CC: MULTIPLE MEDICAL COMPLAI      INTERVAL HISTORY:  in NAD      ROS: No chest pain, no sob, no abd pain. No n/v/d    PAST MEDICAL & SURGICAL HISTORY:  Anemia  ETOH abuse  HLD (hyperlipidemia)  HTN (hypertension)  DM (diabetes mellitus)  No significant past surgical history      PHYSICAL EXAM:  T(C): 36.7 (05-23-20 @ 05:39), Max: 36.7 (05-23-20 @ 05:39)  HR: 56 (05-23-20 @ 05:39)  BP: 151/71 (05-23-20 @ 05:39) (137/69 - 158/78)  RR: 17 (05-23-20 @ 05:39)  SpO2: 100% (05-23-20 @ 05:39)  Wt(kg): --  I&O's Summary    Weight   General: AAO x 3,  NAD.  HEENT: moist mucous membranes, no pallor/cyanosis.  Neck: no JVD visible.  Cardiac: S1, S2. RRR. No murmurs   Respratory: CTA b/l, no access muscle use.   Abdomen: soft. nontender. nondistended  Skin: no rashes.  Extremities: no LE edema b/l  Access:       DATA:    Ferritin, Serum: 618 ng/mL <H> (05-17 @ 07:19)      127<L>  |  92<L>  |  60<H>  ----------------------------<  194<H>  Ca:10.2  (22 May 2020 10:13)  5.0     |  19<L>  |  1.72<H>        TPro  x      /  Alb  4.1    /  TBili  x      /  DBili  x      /  AST  x      /  ALT  x      /  AlkPhos  x      21 May 2020 13:53          Osmolality, Random Urine: 265 mosmol/kg (05-22 @ 16:51)  Sodium, Random Urine: 41 mmol/L (05-22 @ 16:50)  Sodium, Random Urine: 28 mmol/L (05-22 @ 04:20)  Osmolality, Random Urine: 250 mosmol/kg (05-22 @ 04:20)            MEDICATIONS  (STANDING):  amLODIPine   Tablet 10 milliGRAM(s) Oral daily  AQUAPHOR (petrolatum Ointment) 1 Application(s) Topical two times a day  atorvastatin 10 milliGRAM(s) Oral at bedtime  baclofen 2.5 milliGRAM(s) Oral every 12 hours  brimonidine 0.2% Ophthalmic Solution 1 Drop(s) Both EYES three times a day  chlorhexidine 2% Cloths 1 Application(s) Topical daily  dextrose 5%. 1000 milliLiter(s) (50 mL/Hr) IV Continuous <Continuous>  dextrose 50% Injectable 12.5 Gram(s) IV Push once  dextrose 50% Injectable 25 Gram(s) IV Push once  dextrose 50% Injectable 25 Gram(s) IV Push once  diphenoxylate/atropine 1 Tablet(s) Oral daily  dorzolamide 2% Ophthalmic Solution 1 Drop(s) Both EYES three times a day  gabapentin 300 milliGRAM(s) Oral every 8 hours  heparin   Injectable 7500 Unit(s) SubCutaneous every 8 hours  insulin glargine Injectable (LANTUS) 16 Unit(s) SubCutaneous at bedtime  insulin lispro (HumaLOG) corrective regimen sliding scale   SubCutaneous Before meals and at bedtime  insulin lispro Injectable (HumaLOG) 8 Unit(s) SubCutaneous three times a day before meals  metFORMIN 1000 milliGRAM(s) Oral two times a day with meals  pantoprazole    Tablet 40 milliGRAM(s) Oral every 12 hours  rifAXIMin 550 milliGRAM(s) Oral two times a day  sodium bicarbonate 650 milliGRAM(s) Oral three times a day  sodium chloride 1 Gram(s) Oral three times a day  traZODone 100 milliGRAM(s) Oral at bedtime    MEDICATIONS  (PRN):  acetaminophen   Tablet .. 500 milliGRAM(s) Oral every 6 hours PRN Mild Pain (1 - 3), Moderate Pain (4 - 6)  dextrose 40% Gel 15 Gram(s) Oral once PRN Blood Glucose LESS THAN 70 milliGRAM(s)/deciliter  glucagon  Injectable 1 milliGRAM(s) IntraMuscular once PRN Glucose LESS THAN 70 milligrams/deciliter  ondansetron    Tablet 4 milliGRAM(s) Oral every 8 hours PRN Nausea and/or Vomiting

## 2020-05-23 NOTE — PROGRESS NOTE ADULT - SUBJECTIVE AND OBJECTIVE BOX
Patient is a 60y old  Male who presents with a chief complaint of toxic megacolon, COVID + (06 May 2020 10:01)      INTERVAL HPI/OVERNIGHT EVENTS:    Pt. seen and examined this morning  Pt. has no complaints  Denies F/C, CP, SOB, cough    Review of Systems: 12 point review of systems otherwise negative    MEDICATIONS  (STANDING):  amLODIPine   Tablet 10 milliGRAM(s) Oral daily  AQUAPHOR (petrolatum Ointment) 1 Application(s) Topical two times a day  atorvastatin 10 milliGRAM(s) Oral at bedtime  baclofen 2.5 milliGRAM(s) Oral every 12 hours  brimonidine 0.2% Ophthalmic Solution 1 Drop(s) Both EYES three times a day  chlorhexidine 2% Cloths 1 Application(s) Topical daily  dextrose 5%. 1000 milliLiter(s) (50 mL/Hr) IV Continuous <Continuous>  dextrose 50% Injectable 12.5 Gram(s) IV Push once  dextrose 50% Injectable 25 Gram(s) IV Push once  dextrose 50% Injectable 25 Gram(s) IV Push once  diphenoxylate/atropine 1 Tablet(s) Oral daily  dorzolamide 2% Ophthalmic Solution 1 Drop(s) Both EYES three times a day  gabapentin 300 milliGRAM(s) Oral every 8 hours  heparin   Injectable 7500 Unit(s) SubCutaneous every 8 hours  insulin glargine Injectable (LANTUS) 16 Unit(s) SubCutaneous at bedtime  insulin lispro (HumaLOG) corrective regimen sliding scale   SubCutaneous Before meals and at bedtime  insulin lispro Injectable (HumaLOG) 8 Unit(s) SubCutaneous three times a day before meals  magnesium sulfate  IVPB 1 Gram(s) IV Intermittent once  metFORMIN 1000 milliGRAM(s) Oral two times a day with meals  pantoprazole    Tablet 40 milliGRAM(s) Oral every 12 hours  rifAXIMin 550 milliGRAM(s) Oral two times a day  sodium bicarbonate 650 milliGRAM(s) Oral three times a day  sodium chloride 1 Gram(s) Oral three times a day  traZODone 100 milliGRAM(s) Oral at bedtime    MEDICATIONS  (PRN):  acetaminophen   Tablet .. 500 milliGRAM(s) Oral every 6 hours PRN Mild Pain (1 - 3), Moderate Pain (4 - 6)  dextrose 40% Gel 15 Gram(s) Oral once PRN Blood Glucose LESS THAN 70 milliGRAM(s)/deciliter  glucagon  Injectable 1 milliGRAM(s) IntraMuscular once PRN Glucose LESS THAN 70 milligrams/deciliter  ondansetron    Tablet 4 milliGRAM(s) Oral every 8 hours PRN Nausea and/or Vomiting    Allergies    No Known Allergies    Intolerances          Vital Signs Last 24 Hrs  T(C): 36.7 (23 May 2020 05:39), Max: 36.7 (23 May 2020 05:39)  T(F): 98.1 (23 May 2020 05:39), Max: 98.1 (23 May 2020 05:39)  HR: 56 (23 May 2020 05:39) (56 - 89)  BP: 151/71 (23 May 2020 05:39) (137/69 - 158/78)  BP(mean): 105 (22 May 2020 12:22) (105 - 105)  RR: 17 (23 May 2020 05:39) (17 - 18)  SpO2: 100% (23 May 2020 05:39) (99% - 100%)    Physical Exam:  (this morning)  General: comfortable-appearing in NAD  HEENT: MMM  Lungs:  normal WOB on RA  Abdomen:  +ileostomy, surgical dressing C/D/I  Skin:  WWP  Neuro:  AAOx3    LABS:    05-23    128<L>  |  93<L>  |  52<H>  ----------------------------<  208<H>  5.1   |  23  |  1.50<H>    Ca    9.5      23 May 2020 10:07  Phos  4.8     05-22  Mg     1.8     05-23    TPro  x   /  Alb  4.1  /  TBili  x   /  DBili  x   /  AST  x   /  ALT  x   /  AlkPhos  x   05-21

## 2020-05-23 NOTE — PROGRESS NOTE ADULT - PROBLEM SELECTOR PLAN 1
received 500cc bolus of NS'  awaiting today's labs to make proper recommendations  received some diuresis as well yesterday  continue fluid restriction

## 2020-05-24 LAB
ANION GAP SERPL CALC-SCNC: 15 MMOL/L — SIGNIFICANT CHANGE UP (ref 5–17)
BUN SERPL-MCNC: 46 MG/DL — HIGH (ref 7–23)
CALCIUM SERPL-MCNC: 10 MG/DL — SIGNIFICANT CHANGE UP (ref 8.4–10.5)
CHLORIDE SERPL-SCNC: 93 MMOL/L — LOW (ref 96–108)
CO2 SERPL-SCNC: 20 MMOL/L — LOW (ref 22–31)
CREAT SERPL-MCNC: 1.35 MG/DL — HIGH (ref 0.5–1.3)
GLUCOSE BLDC GLUCOMTR-MCNC: 192 MG/DL — HIGH (ref 70–99)
GLUCOSE BLDC GLUCOMTR-MCNC: 196 MG/DL — HIGH (ref 70–99)
GLUCOSE BLDC GLUCOMTR-MCNC: 207 MG/DL — HIGH (ref 70–99)
GLUCOSE BLDC GLUCOMTR-MCNC: 240 MG/DL — HIGH (ref 70–99)
GLUCOSE SERPL-MCNC: 226 MG/DL — HIGH (ref 70–99)
POTASSIUM SERPL-MCNC: 4.5 MMOL/L — SIGNIFICANT CHANGE UP (ref 3.5–5.3)
POTASSIUM SERPL-SCNC: 4.5 MMOL/L — SIGNIFICANT CHANGE UP (ref 3.5–5.3)
SARS-COV-2 RNA SPEC QL NAA+PROBE: SIGNIFICANT CHANGE UP
SODIUM SERPL-SCNC: 128 MMOL/L — LOW (ref 135–145)

## 2020-05-24 PROCEDURE — 99231 SBSQ HOSP IP/OBS SF/LOW 25: CPT

## 2020-05-24 PROCEDURE — 99232 SBSQ HOSP IP/OBS MODERATE 35: CPT | Mod: GC

## 2020-05-24 RX ORDER — INSULIN GLARGINE 100 [IU]/ML
18 INJECTION, SOLUTION SUBCUTANEOUS AT BEDTIME
Refills: 0 | Status: DISCONTINUED | OUTPATIENT
Start: 2020-05-24 | End: 2020-05-28

## 2020-05-24 RX ADMIN — BRIMONIDINE TARTRATE 1 DROP(S): 2 SOLUTION/ DROPS OPHTHALMIC at 06:45

## 2020-05-24 RX ADMIN — Medication 4: at 17:13

## 2020-05-24 RX ADMIN — METFORMIN HYDROCHLORIDE 1000 MILLIGRAM(S): 850 TABLET ORAL at 17:15

## 2020-05-24 RX ADMIN — Medication 2.5 MILLIGRAM(S): at 17:15

## 2020-05-24 RX ADMIN — CHLORHEXIDINE GLUCONATE 1 APPLICATION(S): 213 SOLUTION TOPICAL at 11:34

## 2020-05-24 RX ADMIN — GABAPENTIN 300 MILLIGRAM(S): 400 CAPSULE ORAL at 21:15

## 2020-05-24 RX ADMIN — Medication 8 UNIT(S): at 12:35

## 2020-05-24 RX ADMIN — GABAPENTIN 300 MILLIGRAM(S): 400 CAPSULE ORAL at 06:43

## 2020-05-24 RX ADMIN — PANTOPRAZOLE SODIUM 40 MILLIGRAM(S): 20 TABLET, DELAYED RELEASE ORAL at 17:15

## 2020-05-24 RX ADMIN — Medication 8 UNIT(S): at 17:14

## 2020-05-24 RX ADMIN — Medication 1 TABLET(S): at 11:34

## 2020-05-24 RX ADMIN — Medication 8 UNIT(S): at 08:15

## 2020-05-24 RX ADMIN — HEPARIN SODIUM 7500 UNIT(S): 5000 INJECTION INTRAVENOUS; SUBCUTANEOUS at 21:14

## 2020-05-24 RX ADMIN — SODIUM CHLORIDE 1 GRAM(S): 9 INJECTION INTRAMUSCULAR; INTRAVENOUS; SUBCUTANEOUS at 13:08

## 2020-05-24 RX ADMIN — GABAPENTIN 300 MILLIGRAM(S): 400 CAPSULE ORAL at 13:08

## 2020-05-24 RX ADMIN — Medication 650 MILLIGRAM(S): at 13:07

## 2020-05-24 RX ADMIN — BRIMONIDINE TARTRATE 1 DROP(S): 2 SOLUTION/ DROPS OPHTHALMIC at 21:43

## 2020-05-24 RX ADMIN — Medication 650 MILLIGRAM(S): at 21:14

## 2020-05-24 RX ADMIN — Medication 4: at 08:14

## 2020-05-24 RX ADMIN — SODIUM CHLORIDE 1 GRAM(S): 9 INJECTION INTRAMUSCULAR; INTRAVENOUS; SUBCUTANEOUS at 06:48

## 2020-05-24 RX ADMIN — Medication 650 MILLIGRAM(S): at 06:48

## 2020-05-24 RX ADMIN — BRIMONIDINE TARTRATE 1 DROP(S): 2 SOLUTION/ DROPS OPHTHALMIC at 13:07

## 2020-05-24 RX ADMIN — Medication 2: at 12:35

## 2020-05-24 RX ADMIN — Medication 2.5 MILLIGRAM(S): at 06:44

## 2020-05-24 RX ADMIN — PANTOPRAZOLE SODIUM 40 MILLIGRAM(S): 20 TABLET, DELAYED RELEASE ORAL at 06:43

## 2020-05-24 RX ADMIN — DORZOLAMIDE HYDROCHLORIDE 1 DROP(S): 20 SOLUTION/ DROPS OPHTHALMIC at 06:45

## 2020-05-24 RX ADMIN — Medication 1 APPLICATION(S): at 17:40

## 2020-05-24 RX ADMIN — INSULIN GLARGINE 18 UNIT(S): 100 INJECTION, SOLUTION SUBCUTANEOUS at 22:43

## 2020-05-24 RX ADMIN — HEPARIN SODIUM 7500 UNIT(S): 5000 INJECTION INTRAVENOUS; SUBCUTANEOUS at 06:43

## 2020-05-24 RX ADMIN — HEPARIN SODIUM 7500 UNIT(S): 5000 INJECTION INTRAVENOUS; SUBCUTANEOUS at 13:07

## 2020-05-24 RX ADMIN — DORZOLAMIDE HYDROCHLORIDE 1 DROP(S): 20 SOLUTION/ DROPS OPHTHALMIC at 21:42

## 2020-05-24 RX ADMIN — Medication 100 MILLIGRAM(S): at 21:15

## 2020-05-24 RX ADMIN — METFORMIN HYDROCHLORIDE 1000 MILLIGRAM(S): 850 TABLET ORAL at 08:14

## 2020-05-24 RX ADMIN — Medication 2: at 22:44

## 2020-05-24 RX ADMIN — SODIUM CHLORIDE 1 GRAM(S): 9 INJECTION INTRAMUSCULAR; INTRAVENOUS; SUBCUTANEOUS at 21:14

## 2020-05-24 RX ADMIN — DORZOLAMIDE HYDROCHLORIDE 1 DROP(S): 20 SOLUTION/ DROPS OPHTHALMIC at 13:06

## 2020-05-24 RX ADMIN — AMLODIPINE BESYLATE 10 MILLIGRAM(S): 2.5 TABLET ORAL at 06:43

## 2020-05-24 RX ADMIN — ATORVASTATIN CALCIUM 10 MILLIGRAM(S): 80 TABLET, FILM COATED ORAL at 21:15

## 2020-05-24 RX ADMIN — Medication 1 APPLICATION(S): at 06:46

## 2020-05-24 NOTE — PROGRESS NOTE ADULT - PROBLEM SELECTOR PLAN 4
nonoliguric MEG likely 2/2 pre-renal + intrinsic 2/2 COVID-19 + recent initiation/increase of Lasix  -Cr with downtrend; f/u 10am labs  -monitor I/Os   -renal following - appreciate recs  -avoid nephrotoxic agents    #Non-anion gap metabolic acidosis   -sodium bicarbonate TID  -f/u further nephrology recs

## 2020-05-24 NOTE — PROGRESS NOTE ADULT - SUBJECTIVE AND OBJECTIVE BOX
OVERNIGHT EVENTS: No acute events overnight.  SUBJECTIVE: Patient seen and examined at the bedside. Patient denies new complaints at this time. Patient notes unchanged ostomy output (requires ~3 bag changes daily). 12-point ROS reviewed and is otherwise negative.    Vital Signs Last 12 Hrs  T(F): 97.8 (05-24-20 @ 06:27), Max: 97.8 (05-24-20 @ 06:27)  HR: 88 (05-24-20 @ 06:27) (70 - 88)  BP: 154/90 (05-24-20 @ 06:27) (141/68 - 154/90)  BP(mean): 92 (05-23-20 @ 22:15) (92 - 92)  RR: 18 (05-24-20 @ 06:27) (16 - 18)  SpO2: 98% (05-24-20 @ 06:27) (98% - 100%)    PHYSICAL EXAM:  General: NAD, resting comfortably in bed, AAOx3  HEENT: NCAT, EOMI, MMM  Neck: supple  Respiratory: CTA, normal respiratory rate/depth/effort  Cardiovascular: normal S1/S2, RRR, no MRG  Vascular: 2+ radial/DP pulses b/l  Abdomen: NT, soft, ileostomy output nonbloody; midline wound dressing c/d/i  Extremities: WWP, no peripheral edema    LABS:    05-23    128<L>  |  93<L>  |  52<H>  ----------------------------<  208<H>  5.1   |  23  |  1.50<H>    Ca    9.5      23 May 2020 10:07  Phos  4.8     05-22  Mg     1.8     05-23      RADIOLOGY & ADDITIONAL TESTS: Reviewed    MEDICATIONS  (STANDING):  amLODIPine   Tablet 10 milliGRAM(s) Oral daily  AQUAPHOR (petrolatum Ointment) 1 Application(s) Topical two times a day  atorvastatin 10 milliGRAM(s) Oral at bedtime  baclofen 2.5 milliGRAM(s) Oral every 12 hours  brimonidine 0.2% Ophthalmic Solution 1 Drop(s) Both EYES three times a day  chlorhexidine 2% Cloths 1 Application(s) Topical daily  dextrose 5%. 1000 milliLiter(s) (50 mL/Hr) IV Continuous <Continuous>  dextrose 50% Injectable 12.5 Gram(s) IV Push once  dextrose 50% Injectable 25 Gram(s) IV Push once  dextrose 50% Injectable 25 Gram(s) IV Push once  diphenoxylate/atropine 1 Tablet(s) Oral daily  dorzolamide 2% Ophthalmic Solution 1 Drop(s) Both EYES three times a day  gabapentin 300 milliGRAM(s) Oral every 8 hours  heparin   Injectable 7500 Unit(s) SubCutaneous every 8 hours  insulin glargine Injectable (LANTUS) 16 Unit(s) SubCutaneous at bedtime  insulin lispro (HumaLOG) corrective regimen sliding scale   SubCutaneous Before meals and at bedtime  insulin lispro Injectable (HumaLOG) 8 Unit(s) SubCutaneous three times a day before meals  metFORMIN 1000 milliGRAM(s) Oral two times a day with meals  pantoprazole    Tablet 40 milliGRAM(s) Oral every 12 hours  rifAXIMin 550 milliGRAM(s) Oral two times a day  sodium bicarbonate 650 milliGRAM(s) Oral three times a day  sodium chloride 1 Gram(s) Oral three times a day  traZODone 100 milliGRAM(s) Oral at bedtime    MEDICATIONS  (PRN):  acetaminophen   Tablet .. 500 milliGRAM(s) Oral every 6 hours PRN Mild Pain (1 - 3), Moderate Pain (4 - 6)  dextrose 40% Gel 15 Gram(s) Oral once PRN Blood Glucose LESS THAN 70 milliGRAM(s)/deciliter  glucagon  Injectable 1 milliGRAM(s) IntraMuscular once PRN Glucose LESS THAN 70 milligrams/deciliter  ondansetron    Tablet 4 milliGRAM(s) Oral every 8 hours PRN Nausea and/or Vomiting

## 2020-05-24 NOTE — PROGRESS NOTE ADULT - PROBLEM SELECTOR PLAN 5
normocytic anemia likely 2/2 chronic disease + kidney dysfunction vs less likely acute blood loss  -Hgb downtrended to 6.6 on 5/5, s/p unit PRBC on 5/5, H/H now stable at 8.3/25.7  -s/p 7U pRBCs total this admission as of 5/5/20  -initial concern for GI source, however patient refused further workup and GI states capsule endoscopy no longer indicated; ostomy output has remained non-bloody  -hemodynamically stable; low concern for active bleed  -monitor hb, transfuse <7  -pantoprazole 40mg BID    #Hyponatremia in setting of cirrhosis  -salt tabs 1g tid  -fluid restriction  -monitor BMP  -f/u nephrology recs

## 2020-05-24 NOTE — PROGRESS NOTE ADULT - ASSESSMENT
Diabetes, suboptimal.   A1c  near goal.  Current regimen in hospital is less insulin than he normally takes at home but presumable he is also eating less.  recommend increasing glargine to 18 units since morning sugars are > 200 past two mornings.  continue same lispro for meals.

## 2020-05-24 NOTE — PROGRESS NOTE ADULT - ASSESSMENT
60M PMH HTN, DM + neuropathy, IVDU, ETOH liver cirrhosis, presents to [facility] s/p fall at home; found with toxic megacolon and pneumatosis; now s/p ex lap + subtotal colectomy, ileostomy (3/31). Hospital course further complicated by COVID, MEG, anemia with suspicion for GI source/varices (refused further GI workup). Pt stable for discharge pending establishment of proper wound-care services.

## 2020-05-24 NOTE — PROGRESS NOTE ADULT - SUBJECTIVE AND OBJECTIVE BOX
Sugars reviewed.  waking up high in the mornings, past two days  today:  207  196  5/23: 239   117    133   183    home regimen: Levemir 25 units, Novolog 15-15-10 TID  current regimen:  Lantus 16 units, lispro 8 units TID      Current Meds:  acetaminophen   Tablet .. 500 milliGRAM(s) Oral every 6 hours PRN  amLODIPine   Tablet 10 milliGRAM(s) Oral daily  AQUAPHOR (petrolatum Ointment) 1 Application(s) Topical two times a day  atorvastatin 10 milliGRAM(s) Oral at bedtime  baclofen 2.5 milliGRAM(s) Oral every 12 hours  brimonidine 0.2% Ophthalmic Solution 1 Drop(s) Both EYES three times a day  chlorhexidine 2% Cloths 1 Application(s) Topical daily  dextrose 40% Gel 15 Gram(s) Oral once PRN  dextrose 5%. 1000 milliLiter(s) IV Continuous <Continuous>  dextrose 50% Injectable 12.5 Gram(s) IV Push once  dextrose 50% Injectable 25 Gram(s) IV Push once  dextrose 50% Injectable 25 Gram(s) IV Push once  diphenoxylate/atropine 1 Tablet(s) Oral daily  dorzolamide 2% Ophthalmic Solution 1 Drop(s) Both EYES three times a day  gabapentin 300 milliGRAM(s) Oral every 8 hours  glucagon  Injectable 1 milliGRAM(s) IntraMuscular once PRN  heparin   Injectable 7500 Unit(s) SubCutaneous every 8 hours  insulin glargine Injectable (LANTUS) 18 Unit(s) SubCutaneous at bedtime  insulin lispro (HumaLOG) corrective regimen sliding scale   SubCutaneous Before meals and at bedtime  insulin lispro Injectable (HumaLOG) 8 Unit(s) SubCutaneous three times a day before meals  metFORMIN 1000 milliGRAM(s) Oral two times a day with meals  ondansetron    Tablet 4 milliGRAM(s) Oral every 8 hours PRN  pantoprazole    Tablet 40 milliGRAM(s) Oral every 12 hours  rifAXIMin 550 milliGRAM(s) Oral two times a day  sodium bicarbonate 650 milliGRAM(s) Oral three times a day  sodium chloride 1 Gram(s) Oral three times a day  traZODone 100 milliGRAM(s) Oral at bedtime      Allergies:  No Known Allergies    Vital Signs Last 24 Hrs  T(C): 36.6 (24 May 2020 06:27), Max: 36.6 (24 May 2020 06:27)  T(F): 97.8 (24 May 2020 06:27), Max: 97.8 (24 May 2020 06:27)  HR: 88 (24 May 2020 06:27) (70 - 88)  BP: 154/90 (24 May 2020 06:27) (141/68 - 154/90)  BP(mean): 92 (23 May 2020 22:15) (92 - 92)  RR: 18 (24 May 2020 06:27) (16 - 18)  SpO2: 98% (24 May 2020 06:27) (98% - 100%)  Constitutional: wn/wd in NAD.   HEENT: NCAT, MMM, OP clear, EOMI, , no proptosis or lid retraction  Neck: no thyromegaly or palpable thyroid nodules   Respiratory: lungs CTAB.  Cardiovascular: regular rhythm, normal S1 and S2, no audible murmurs, no peripheral edema  GI: soft, NT/ND, no masses/HSM appreciated.  Neurology: no tremors, DTR 2+  Skin: no visible rashes/lesions  Psychiatric: AAO x 3, normal affect/mood.  Ext: radial pulses intact, DP pulses intact, extremities warm, no cyanosis, clubbing or edema.       LABS:    05-24    128<L>  |  93<L>  |  46<H>  ----------------------------<  226<H>  4.5   |  20<L>  |  1.35<H>    Ca    10.0      24 May 2020 11:18  Mg     1.8     05-23          Hemoglobin A1C, Whole Blood: 6.9 (04-05 @ 06:59)  Hemoglobin A1C, Whole Blood: 7.6 (03-31 @ 05:58)    Thyroid Stimulating Hormone, Serum: 2.389 (05-18 @ 07:38)  Thyroid Stimulating Hormone, Serum: 3.869 (05-13 @ 06:50)  Thyroid Stimulating Hormone, Serum: 3.104 (05-11 @ 06:10)  Thyroid Stimulating Hormone, Serum: 2.712 (05-07 @ 06:31)  Thyroid Stimulating Hormone, Serum: 3.063 (05-07 @ 06:31)  Thyroid Stimulating Hormone, Serum: 3.509 (05-03 @ 06:07)  Thyroid Stimulating Hormone, Serum: 3.325 (04-29 @ 06:17)  Thyroid Stimulating Hormone, Serum: 2.813 (04-14 @ 07:28)  Thyroid Stimulating Hormone, Serum: 2.877 (04-14 @ 00:34)

## 2020-05-24 NOTE — PROGRESS NOTE ADULT - ASSESSMENT
61 yo male with Hx IVDU, ETOH liver cirrhosis admitted for toxic megacolon, pneumatosis and SARS-CoV-2 infection, underwent ex lap and subtotal colectomy with ileostomy creation,  orignally followed for MEG which resolved  now following for hyponatremia and proper diuretic dosing

## 2020-05-24 NOTE — PROGRESS NOTE ADULT - SUBJECTIVE AND OBJECTIVE BOX
CC: MULTIPLE MEDICAL COMPLAI      INTERVAL HISTORY:  chart and labs reviewed  awaiting 10AM labs      ROS: No chest pain, no sob, no abd pain. No n/v/d    PAST MEDICAL & SURGICAL HISTORY:  Anemia  ETOH abuse  HLD (hyperlipidemia)  HTN (hypertension)  DM (diabetes mellitus)  No significant past surgical history      PHYSICAL EXAM:  T(C): 36.6 (05-24-20 @ 06:27), Max: 36.6 (05-24-20 @ 06:27)  HR: 88 (05-24-20 @ 06:27)  BP: 154/90 (05-24-20 @ 06:27) (133/84 - 154/90)  RR: 18 (05-24-20 @ 06:27)  SpO2: 98% (05-24-20 @ 06:27)  Wt(kg): --  I&O's Summary    Weight   General: AAO x 3,  NAD.  HEENT: moist mucous membranes, no pallor/cyanosis.  Neck: no JVD visible.  Cardiac: S1, S2. RRR. No murmurs   Respratory: CTA b/l, no access muscle use.   Abdomen: soft. nontender. nondistended  Skin: no rashes.  Extremities: no LE edema b/l  Access:       DATA:    Ferritin, Serum: 618 ng/mL <H> (05-17 @ 07:19)      128<L>  |  93<L>  |  52<H>  ----------------------------<  208<H>  Ca:9.5   (23 May 2020 10:07)  5.1     |  23     |  1.50<H>      eGFR if Non : 50 <L>  eGFR if : 58 <L>    TPro  x      /  Alb  4.1    /  TBili  x      /  DBili  x      /  AST  x      /  ALT  x      /  AlkPhos  x      21 May 2020 13:53          Osmolality, Random Urine: 265 mosmol/kg (05-22 @ 16:51)  Sodium, Random Urine: 41 mmol/L (05-22 @ 16:50)            MEDICATIONS  (STANDING):  amLODIPine   Tablet 10 milliGRAM(s) Oral daily  AQUAPHOR (petrolatum Ointment) 1 Application(s) Topical two times a day  atorvastatin 10 milliGRAM(s) Oral at bedtime  baclofen 2.5 milliGRAM(s) Oral every 12 hours  brimonidine 0.2% Ophthalmic Solution 1 Drop(s) Both EYES three times a day  chlorhexidine 2% Cloths 1 Application(s) Topical daily  dextrose 5%. 1000 milliLiter(s) (50 mL/Hr) IV Continuous <Continuous>  dextrose 50% Injectable 12.5 Gram(s) IV Push once  dextrose 50% Injectable 25 Gram(s) IV Push once  dextrose 50% Injectable 25 Gram(s) IV Push once  diphenoxylate/atropine 1 Tablet(s) Oral daily  dorzolamide 2% Ophthalmic Solution 1 Drop(s) Both EYES three times a day  gabapentin 300 milliGRAM(s) Oral every 8 hours  heparin   Injectable 7500 Unit(s) SubCutaneous every 8 hours  insulin glargine Injectable (LANTUS) 16 Unit(s) SubCutaneous at bedtime  insulin lispro (HumaLOG) corrective regimen sliding scale   SubCutaneous Before meals and at bedtime  insulin lispro Injectable (HumaLOG) 8 Unit(s) SubCutaneous three times a day before meals  metFORMIN 1000 milliGRAM(s) Oral two times a day with meals  pantoprazole    Tablet 40 milliGRAM(s) Oral every 12 hours  rifAXIMin 550 milliGRAM(s) Oral two times a day  sodium bicarbonate 650 milliGRAM(s) Oral three times a day  sodium chloride 1 Gram(s) Oral three times a day  traZODone 100 milliGRAM(s) Oral at bedtime    MEDICATIONS  (PRN):  acetaminophen   Tablet .. 500 milliGRAM(s) Oral every 6 hours PRN Mild Pain (1 - 3), Moderate Pain (4 - 6)  dextrose 40% Gel 15 Gram(s) Oral once PRN Blood Glucose LESS THAN 70 milliGRAM(s)/deciliter  glucagon  Injectable 1 milliGRAM(s) IntraMuscular once PRN Glucose LESS THAN 70 milligrams/deciliter  ondansetron    Tablet 4 milliGRAM(s) Oral every 8 hours PRN Nausea and/or Vomiting

## 2020-05-25 LAB
ANION GAP SERPL CALC-SCNC: 15 MMOL/L — SIGNIFICANT CHANGE UP (ref 5–17)
BUN SERPL-MCNC: 51 MG/DL — HIGH (ref 7–23)
CALCIUM SERPL-MCNC: 9.8 MG/DL — SIGNIFICANT CHANGE UP (ref 8.4–10.5)
CHLORIDE SERPL-SCNC: 95 MMOL/L — LOW (ref 96–108)
CO2 SERPL-SCNC: 19 MMOL/L — LOW (ref 22–31)
CREAT SERPL-MCNC: 1.52 MG/DL — HIGH (ref 0.5–1.3)
GLUCOSE BLDC GLUCOMTR-MCNC: 114 MG/DL — HIGH (ref 70–99)
GLUCOSE BLDC GLUCOMTR-MCNC: 117 MG/DL — HIGH (ref 70–99)
GLUCOSE BLDC GLUCOMTR-MCNC: 168 MG/DL — HIGH (ref 70–99)
GLUCOSE BLDC GLUCOMTR-MCNC: 73 MG/DL — SIGNIFICANT CHANGE UP (ref 70–99)
GLUCOSE SERPL-MCNC: 75 MG/DL — SIGNIFICANT CHANGE UP (ref 70–99)
POTASSIUM SERPL-MCNC: 4.9 MMOL/L — SIGNIFICANT CHANGE UP (ref 3.5–5.3)
POTASSIUM SERPL-SCNC: 4.9 MMOL/L — SIGNIFICANT CHANGE UP (ref 3.5–5.3)
SODIUM SERPL-SCNC: 129 MMOL/L — LOW (ref 135–145)

## 2020-05-25 PROCEDURE — 99232 SBSQ HOSP IP/OBS MODERATE 35: CPT | Mod: GC

## 2020-05-25 RX ORDER — METFORMIN HYDROCHLORIDE 850 MG/1
500 TABLET ORAL EVERY 12 HOURS
Refills: 0 | Status: DISCONTINUED | OUTPATIENT
Start: 2020-05-26 | End: 2020-05-28

## 2020-05-25 RX ADMIN — HEPARIN SODIUM 7500 UNIT(S): 5000 INJECTION INTRAVENOUS; SUBCUTANEOUS at 21:31

## 2020-05-25 RX ADMIN — ATORVASTATIN CALCIUM 10 MILLIGRAM(S): 80 TABLET, FILM COATED ORAL at 21:28

## 2020-05-25 RX ADMIN — SODIUM CHLORIDE 1 GRAM(S): 9 INJECTION INTRAMUSCULAR; INTRAVENOUS; SUBCUTANEOUS at 14:10

## 2020-05-25 RX ADMIN — HEPARIN SODIUM 7500 UNIT(S): 5000 INJECTION INTRAVENOUS; SUBCUTANEOUS at 05:03

## 2020-05-25 RX ADMIN — BRIMONIDINE TARTRATE 1 DROP(S): 2 SOLUTION/ DROPS OPHTHALMIC at 21:28

## 2020-05-25 RX ADMIN — DORZOLAMIDE HYDROCHLORIDE 1 DROP(S): 20 SOLUTION/ DROPS OPHTHALMIC at 21:28

## 2020-05-25 RX ADMIN — AMLODIPINE BESYLATE 10 MILLIGRAM(S): 2.5 TABLET ORAL at 05:05

## 2020-05-25 RX ADMIN — GABAPENTIN 300 MILLIGRAM(S): 400 CAPSULE ORAL at 05:04

## 2020-05-25 RX ADMIN — BRIMONIDINE TARTRATE 1 DROP(S): 2 SOLUTION/ DROPS OPHTHALMIC at 14:11

## 2020-05-25 RX ADMIN — PANTOPRAZOLE SODIUM 40 MILLIGRAM(S): 20 TABLET, DELAYED RELEASE ORAL at 05:05

## 2020-05-25 RX ADMIN — DORZOLAMIDE HYDROCHLORIDE 1 DROP(S): 20 SOLUTION/ DROPS OPHTHALMIC at 05:10

## 2020-05-25 RX ADMIN — Medication 8 UNIT(S): at 12:15

## 2020-05-25 RX ADMIN — METFORMIN HYDROCHLORIDE 1000 MILLIGRAM(S): 850 TABLET ORAL at 07:51

## 2020-05-25 RX ADMIN — BRIMONIDINE TARTRATE 1 DROP(S): 2 SOLUTION/ DROPS OPHTHALMIC at 05:10

## 2020-05-25 RX ADMIN — Medication 650 MILLIGRAM(S): at 14:10

## 2020-05-25 RX ADMIN — Medication 8 UNIT(S): at 07:51

## 2020-05-25 RX ADMIN — HEPARIN SODIUM 7500 UNIT(S): 5000 INJECTION INTRAVENOUS; SUBCUTANEOUS at 14:10

## 2020-05-25 RX ADMIN — DORZOLAMIDE HYDROCHLORIDE 1 DROP(S): 20 SOLUTION/ DROPS OPHTHALMIC at 14:11

## 2020-05-25 RX ADMIN — Medication 650 MILLIGRAM(S): at 05:04

## 2020-05-25 RX ADMIN — Medication 2.5 MILLIGRAM(S): at 17:09

## 2020-05-25 RX ADMIN — Medication 650 MILLIGRAM(S): at 21:29

## 2020-05-25 RX ADMIN — Medication 1 TABLET(S): at 12:15

## 2020-05-25 RX ADMIN — Medication 2: at 07:52

## 2020-05-25 RX ADMIN — METFORMIN HYDROCHLORIDE 1000 MILLIGRAM(S): 850 TABLET ORAL at 17:09

## 2020-05-25 RX ADMIN — Medication 1 APPLICATION(S): at 17:10

## 2020-05-25 RX ADMIN — PANTOPRAZOLE SODIUM 40 MILLIGRAM(S): 20 TABLET, DELAYED RELEASE ORAL at 17:09

## 2020-05-25 RX ADMIN — INSULIN GLARGINE 18 UNIT(S): 100 INJECTION, SOLUTION SUBCUTANEOUS at 22:12

## 2020-05-25 RX ADMIN — Medication 8 UNIT(S): at 17:09

## 2020-05-25 RX ADMIN — SODIUM CHLORIDE 1 GRAM(S): 9 INJECTION INTRAMUSCULAR; INTRAVENOUS; SUBCUTANEOUS at 05:04

## 2020-05-25 RX ADMIN — SODIUM CHLORIDE 1 GRAM(S): 9 INJECTION INTRAMUSCULAR; INTRAVENOUS; SUBCUTANEOUS at 21:29

## 2020-05-25 RX ADMIN — Medication 100 MILLIGRAM(S): at 21:30

## 2020-05-25 RX ADMIN — Medication 2.5 MILLIGRAM(S): at 05:04

## 2020-05-25 RX ADMIN — GABAPENTIN 300 MILLIGRAM(S): 400 CAPSULE ORAL at 14:10

## 2020-05-25 RX ADMIN — GABAPENTIN 300 MILLIGRAM(S): 400 CAPSULE ORAL at 21:30

## 2020-05-25 RX ADMIN — Medication 1 APPLICATION(S): at 05:09

## 2020-05-25 RX ADMIN — Medication 500 MILLIGRAM(S): at 18:46

## 2020-05-25 NOTE — PROGRESS NOTE ADULT - PROBLEM SELECTOR PLAN 2
decompensated cirrhosis c/b ascites likely 2/2 alcohol abuse  -SBP ruled out, hepatic encephalopathy poa now resolved  -paracentesis on 4/10 with negative cytology  -abd u/s 5/3 small ascites   -Rifaximin 550mg bid for hepatic encephalopathy (poa)  -Lasix 40mg po qd + Spironolactone 100mg po qd for ascites held due to hyponatremia   -continue holding Lactulose in setting of high ostomy output  -maximum Tylenol 2g qd  -no focal hepatic lesions seen on CT A/P w/IV contrast or abdominal U/S to suggest HCC

## 2020-05-25 NOTE — PROGRESS NOTE ADULT - ASSESSMENT
60M PMH HTN, DM + neuropathy, IVDU, ETOH liver cirrhosis, presents to [facility] s/p fall at home; found with toxic megacolon and pneumatosis; now s/p ex lap + subtotal colectomy, ileostomy (3/31). Hospital course further complicated by COVID, MEG, anemia with suspicion for GI source/varices (refused further GI workup). Pt stable for discharge pending establishment of proper wound-care services.  COVID negative on 5/23

## 2020-05-25 NOTE — PROGRESS NOTE ADULT - SUBJECTIVE AND OBJECTIVE BOX
chart and labs reviewed      Meds:  acetaminophen   Tablet .. 500 every 6 hours PRN  amLODIPine   Tablet 10 daily  AQUAPHOR (petrolatum Ointment) 1 two times a day  atorvastatin 10 at bedtime  baclofen 2.5 every 12 hours  brimonidine 0.2% Ophthalmic Solution 1 three times a day  dextrose 40% Gel 15 once PRN  dextrose 5%. 1000 <Continuous>  dextrose 50% Injectable 12.5 once  dextrose 50% Injectable 25 once  dextrose 50% Injectable 25 once  diphenoxylate/atropine 1 daily  dorzolamide 2% Ophthalmic Solution 1 three times a day  gabapentin 300 every 8 hours  glucagon  Injectable 1 once PRN  heparin   Injectable 7500 every 8 hours  insulin glargine Injectable (LANTUS) 18 at bedtime  insulin lispro (HumaLOG) corrective regimen sliding scale  Before meals and at bedtime  insulin lispro Injectable (HumaLOG) 8 three times a day before meals  metFORMIN 1000 two times a day with meals  pantoprazole    Tablet 40 every 12 hours  rifAXIMin 550 two times a day  sodium bicarbonate 650 three times a day  sodium chloride 1 three times a day  traZODone 100 at bedtime      T(C): , Max: 37.1 (05-25-20 @ 12:11)  T(F): , Max: 98.7 (05-25-20 @ 12:11)  HR: 75 (05-25-20 @ 12:11)  BP: 125/67 (05-25-20 @ 12:11)  BP(mean): --  RR: 18 (05-25-20 @ 12:11)  SpO2: 100% (05-25-20 @ 12:11)  Wt(kg): --      ROS: No chest pain, no sob, no abd pain. No n/v/d      PHYSICAL EXAM:  General: alert,  NAD  HEENT: moist mucous membranes, no pallor/cyanosis  Neck: no JVD visible  Cardiac: S1, S2. RRR. No murmurs   Respratory: CTA b/l, no access muscle use  Abdomen: soft, nontender, nondistended  Skin: no rashes  Extremities: no LE edema b/l    LABS:    05-25    129<L>  |  95<L>  |  51<H>  ----------------------------<  75  4.9   |  19<L>  |  1.52<H>    Ca    9.8      25 May 2020 10:04                  RADIOLOGY & ADDITIONAL STUDIES:

## 2020-05-25 NOTE — PROGRESS NOTE ADULT - PROBLEM SELECTOR PLAN 7
Type 2 DM, endocrinology following   -lantus 18U qhs  -lispro 8U TID pre-meal   -mISS  -metformin 1000mg bid  -continue to appreciate endo recs    #hypothyroidism   -TSH 2.813; TPO and antithyroid Abs neg   -levothyroxine 50mcg discontinued per endocrinology 4/29 given low concern for autoimmune hypothyroidism at this time; will assess necessity of continuation of levothyroxine  -continue to appreciate endocrine recs

## 2020-05-25 NOTE — PROGRESS NOTE ADULT - SUBJECTIVE AND OBJECTIVE BOX
OVERNIGHT EVENTS:  No acute events    SUBJECTIVE:  Patient seen and examined at bedside.  Patient sitting up eating breakfast, reports formed stool output improved with adjustment of his ostomy.  Denies fevers, chill, N/V, abdominal pain, headache or pain.      Vital Signs Last 12 Hrs  T(F): 97.7 (05-25-20 @ 06:21), Max: 97.7 (05-25-20 @ 06:21)  HR: 63 (05-25-20 @ 06:21) (63 - 63)  BP: 126/60 (05-25-20 @ 06:21) (126/60 - 126/60)  BP(mean): --  RR: 18 (05-25-20 @ 06:21) (18 - 18)  SpO2: 100% (05-25-20 @ 06:21) (100% - 100%)  I&O's Summary      PHYSICAL EXAM:  Constitutional: NAD, comfortable sitting up bed.  HEENT: NC/AT, PERRLA, EOMI, no conjunctival pallor or scleral icterus, MMM  Neck: Supple, no JVD  Respiratory: CTA B/L. No w/r/r.   Cardiovascular: RRR, normal S1 and S2, no m/r/g.   Gastrointestinal: +BS, soft NTND, no guarding or rebound tenderness, no palpable masses, R sided ostomy with formed brown output.  Midline abd dressing clean dry and intact   Extremities: wwp; no cyanosis, clubbing or edema.   Vascular: Pulses equal and strong throughout.   Neurological: AAOx3, no CN deficits, strength and sensation intact throughout.   Skin: No gross skin abnormalities or rashes        LABS:    05-24    128<L>  |  93<L>  |  46<H>  ----------------------------<  226<H>  4.5   |  20<L>  |  1.35<H>    Ca    10.0      24 May 2020 11:18  Mg     1.8     05-23              RADIOLOGY & ADDITIONAL TESTS:    MEDICATIONS  (STANDING):  amLODIPine   Tablet 10 milliGRAM(s) Oral daily  AQUAPHOR (petrolatum Ointment) 1 Application(s) Topical two times a day  atorvastatin 10 milliGRAM(s) Oral at bedtime  baclofen 2.5 milliGRAM(s) Oral every 12 hours  brimonidine 0.2% Ophthalmic Solution 1 Drop(s) Both EYES three times a day  chlorhexidine 2% Cloths 1 Application(s) Topical daily  dextrose 5%. 1000 milliLiter(s) (50 mL/Hr) IV Continuous <Continuous>  dextrose 50% Injectable 12.5 Gram(s) IV Push once  dextrose 50% Injectable 25 Gram(s) IV Push once  dextrose 50% Injectable 25 Gram(s) IV Push once  diphenoxylate/atropine 1 Tablet(s) Oral daily  dorzolamide 2% Ophthalmic Solution 1 Drop(s) Both EYES three times a day  gabapentin 300 milliGRAM(s) Oral every 8 hours  heparin   Injectable 7500 Unit(s) SubCutaneous every 8 hours  insulin glargine Injectable (LANTUS) 18 Unit(s) SubCutaneous at bedtime  insulin lispro (HumaLOG) corrective regimen sliding scale   SubCutaneous Before meals and at bedtime  insulin lispro Injectable (HumaLOG) 8 Unit(s) SubCutaneous three times a day before meals  metFORMIN 1000 milliGRAM(s) Oral two times a day with meals  pantoprazole    Tablet 40 milliGRAM(s) Oral every 12 hours  rifAXIMin 550 milliGRAM(s) Oral two times a day  sodium bicarbonate 650 milliGRAM(s) Oral three times a day  sodium chloride 1 Gram(s) Oral three times a day  traZODone 100 milliGRAM(s) Oral at bedtime    MEDICATIONS  (PRN):  acetaminophen   Tablet .. 500 milliGRAM(s) Oral every 6 hours PRN Mild Pain (1 - 3), Moderate Pain (4 - 6)  dextrose 40% Gel 15 Gram(s) Oral once PRN Blood Glucose LESS THAN 70 milliGRAM(s)/deciliter  glucagon  Injectable 1 milliGRAM(s) IntraMuscular once PRN Glucose LESS THAN 70 milligrams/deciliter  ondansetron    Tablet 4 milliGRAM(s) Oral every 8 hours PRN Nausea and/or Vomiting

## 2020-05-25 NOTE — PROGRESS NOTE ADULT - PROBLEM SELECTOR PLAN 10
F: none  E: replete prn to K>4; Mg>2   N: low fiber, consistent carbs  GI ppx: protonix BID   DVT ppx: heparin 7,500u sc q8h    DISPO pending placement

## 2020-05-25 NOTE — PROGRESS NOTE ADULT - ASSESSMENT
61 yo male with Hx IVDU, ETOH liver cirrhosis admitted for toxic megacolon, pneumatosis and SARS-CoV-2 infection, underwent ex lap and subtotal colectomy with ileostomy creation,  orignally followed for MEG which resolved  now following for hyponatremia and proper diuretic dosing.      # Hyponatremia  - stable, 129  - 1.0 L fluid restriction/ day   - NaCl 1g po q8hr    # CKD stage 3  - baseline Cr 1.5-1.7 g/dl   - stable, non-oliguric  - keep euvolemic   - avoid nephrotoxic meds/ agents  - renally adjusted meds (metformin, gabapentin)  - sodium bicarb 650 mg po tid   - check phos

## 2020-05-25 NOTE — PROGRESS NOTE ADULT - PROBLEM SELECTOR PLAN 4
Resolved.  Stable baseline Cr 1.5-2.    -monitor I/Os   -renal following - appreciate recs  -avoid nephrotoxic agents    #Non-anion gap metabolic acidosis   -sodium bicarbonate TID  -f/u further nephrology recs

## 2020-05-25 NOTE — PROGRESS NOTE ADULT - PROBLEM SELECTOR PLAN 1
4/12 COVID PCR (+); 5/15 repeat PCR (+); 5/23 repeat PCR Negative  -s/p hydroxychloroquine + azithromycin 4/13-4/16  -stable respiratory status on room air, continue to monitor respiratory status  -continue supportive care  -contact/droplet precautions  - now negative PCR, pending dispo placement

## 2020-05-25 NOTE — PROGRESS NOTE ADULT - PROBLEM SELECTOR PLAN 5
normocytic anemia likely 2/2 chronic disease + kidney dysfunction vs less likely acute blood loss  stable  -s/p 7U pRBCs total this admission as of 5/5/20  -initial concern for GI source, however patient refused further workup and GI states capsule endoscopy no longer indicated; ostomy output has remained non-bloody  -hemodynamically stable; low concern for active bleed  -monitor hb, transfuse <7  -pantoprazole 40mg BID    #Hyponatremia in setting of cirrhosis  -salt tabs 1g tid  -fluid restriction  -monitor BMP  -f/u nephrology recs

## 2020-05-26 LAB
ANION GAP SERPL CALC-SCNC: 13 MMOL/L — SIGNIFICANT CHANGE UP (ref 5–17)
BUN SERPL-MCNC: 48 MG/DL — HIGH (ref 7–23)
CALCIUM SERPL-MCNC: 10.1 MG/DL — SIGNIFICANT CHANGE UP (ref 8.4–10.5)
CHLORIDE SERPL-SCNC: 95 MMOL/L — LOW (ref 96–108)
CO2 SERPL-SCNC: 22 MMOL/L — SIGNIFICANT CHANGE UP (ref 22–31)
CREAT SERPL-MCNC: 1.38 MG/DL — HIGH (ref 0.5–1.3)
GLUCOSE BLDC GLUCOMTR-MCNC: 132 MG/DL — HIGH (ref 70–99)
GLUCOSE BLDC GLUCOMTR-MCNC: 146 MG/DL — HIGH (ref 70–99)
GLUCOSE BLDC GLUCOMTR-MCNC: 149 MG/DL — HIGH (ref 70–99)
GLUCOSE BLDC GLUCOMTR-MCNC: 279 MG/DL — HIGH (ref 70–99)
GLUCOSE SERPL-MCNC: 151 MG/DL — HIGH (ref 70–99)
POTASSIUM SERPL-MCNC: 5 MMOL/L — SIGNIFICANT CHANGE UP (ref 3.5–5.3)
POTASSIUM SERPL-SCNC: 5 MMOL/L — SIGNIFICANT CHANGE UP (ref 3.5–5.3)
SODIUM SERPL-SCNC: 130 MMOL/L — LOW (ref 135–145)
T4 FREE SERPL-MCNC: 0.84 NG/DL — SIGNIFICANT CHANGE UP (ref 0.7–1.48)
TSH SERPL-MCNC: 2.61 UIU/ML — SIGNIFICANT CHANGE UP (ref 0.35–4.94)

## 2020-05-26 PROCEDURE — 99231 SBSQ HOSP IP/OBS SF/LOW 25: CPT | Mod: GC

## 2020-05-26 PROCEDURE — 99232 SBSQ HOSP IP/OBS MODERATE 35: CPT | Mod: GC

## 2020-05-26 PROCEDURE — 99232 SBSQ HOSP IP/OBS MODERATE 35: CPT

## 2020-05-26 RX ORDER — PETROLATUM,WHITE
1 JELLY (GRAM) TOPICAL
Qty: 0 | Refills: 0 | DISCHARGE
Start: 2020-05-26

## 2020-05-26 RX ORDER — DIPHENOXYLATE HCL/ATROPINE 2.5-.025MG
1 TABLET ORAL DAILY
Refills: 0 | Status: DISCONTINUED | OUTPATIENT
Start: 2020-05-26 | End: 2020-05-27

## 2020-05-26 RX ORDER — DIPHENOXYLATE HCL/ATROPINE 2.5-.025MG
1 TABLET ORAL
Qty: 0 | Refills: 0 | DISCHARGE
Start: 2020-05-26

## 2020-05-26 RX ADMIN — Medication 8 UNIT(S): at 11:56

## 2020-05-26 RX ADMIN — Medication 8 UNIT(S): at 16:54

## 2020-05-26 RX ADMIN — METFORMIN HYDROCHLORIDE 500 MILLIGRAM(S): 850 TABLET ORAL at 16:54

## 2020-05-26 RX ADMIN — METFORMIN HYDROCHLORIDE 500 MILLIGRAM(S): 850 TABLET ORAL at 05:50

## 2020-05-26 RX ADMIN — SODIUM CHLORIDE 1 GRAM(S): 9 INJECTION INTRAMUSCULAR; INTRAVENOUS; SUBCUTANEOUS at 05:49

## 2020-05-26 RX ADMIN — Medication 650 MILLIGRAM(S): at 13:16

## 2020-05-26 RX ADMIN — BRIMONIDINE TARTRATE 1 DROP(S): 2 SOLUTION/ DROPS OPHTHALMIC at 13:18

## 2020-05-26 RX ADMIN — HEPARIN SODIUM 7500 UNIT(S): 5000 INJECTION INTRAVENOUS; SUBCUTANEOUS at 05:50

## 2020-05-26 RX ADMIN — Medication 2.5 MILLIGRAM(S): at 05:49

## 2020-05-26 RX ADMIN — SODIUM CHLORIDE 1 GRAM(S): 9 INJECTION INTRAMUSCULAR; INTRAVENOUS; SUBCUTANEOUS at 13:16

## 2020-05-26 RX ADMIN — Medication 6: at 22:44

## 2020-05-26 RX ADMIN — GABAPENTIN 300 MILLIGRAM(S): 400 CAPSULE ORAL at 13:16

## 2020-05-26 RX ADMIN — Medication 2.5 MILLIGRAM(S): at 16:55

## 2020-05-26 RX ADMIN — INSULIN GLARGINE 18 UNIT(S): 100 INJECTION, SOLUTION SUBCUTANEOUS at 22:43

## 2020-05-26 RX ADMIN — GABAPENTIN 300 MILLIGRAM(S): 400 CAPSULE ORAL at 05:50

## 2020-05-26 RX ADMIN — HEPARIN SODIUM 7500 UNIT(S): 5000 INJECTION INTRAVENOUS; SUBCUTANEOUS at 13:16

## 2020-05-26 RX ADMIN — DORZOLAMIDE HYDROCHLORIDE 1 DROP(S): 20 SOLUTION/ DROPS OPHTHALMIC at 13:18

## 2020-05-26 RX ADMIN — AMLODIPINE BESYLATE 10 MILLIGRAM(S): 2.5 TABLET ORAL at 05:50

## 2020-05-26 RX ADMIN — Medication 1 APPLICATION(S): at 05:46

## 2020-05-26 RX ADMIN — BRIMONIDINE TARTRATE 1 DROP(S): 2 SOLUTION/ DROPS OPHTHALMIC at 22:00

## 2020-05-26 RX ADMIN — DORZOLAMIDE HYDROCHLORIDE 1 DROP(S): 20 SOLUTION/ DROPS OPHTHALMIC at 22:00

## 2020-05-26 RX ADMIN — Medication 100 MILLIGRAM(S): at 21:59

## 2020-05-26 RX ADMIN — ATORVASTATIN CALCIUM 10 MILLIGRAM(S): 80 TABLET, FILM COATED ORAL at 21:57

## 2020-05-26 RX ADMIN — Medication 500 MILLIGRAM(S): at 21:57

## 2020-05-26 RX ADMIN — Medication 1 TABLET(S): at 11:56

## 2020-05-26 RX ADMIN — Medication 500 MILLIGRAM(S): at 05:50

## 2020-05-26 RX ADMIN — PANTOPRAZOLE SODIUM 40 MILLIGRAM(S): 20 TABLET, DELAYED RELEASE ORAL at 16:55

## 2020-05-26 RX ADMIN — Medication 650 MILLIGRAM(S): at 21:59

## 2020-05-26 RX ADMIN — GABAPENTIN 300 MILLIGRAM(S): 400 CAPSULE ORAL at 21:58

## 2020-05-26 RX ADMIN — Medication 650 MILLIGRAM(S): at 05:48

## 2020-05-26 RX ADMIN — SODIUM CHLORIDE 1 GRAM(S): 9 INJECTION INTRAMUSCULAR; INTRAVENOUS; SUBCUTANEOUS at 21:59

## 2020-05-26 RX ADMIN — DORZOLAMIDE HYDROCHLORIDE 1 DROP(S): 20 SOLUTION/ DROPS OPHTHALMIC at 05:46

## 2020-05-26 RX ADMIN — PANTOPRAZOLE SODIUM 40 MILLIGRAM(S): 20 TABLET, DELAYED RELEASE ORAL at 05:56

## 2020-05-26 RX ADMIN — BRIMONIDINE TARTRATE 1 DROP(S): 2 SOLUTION/ DROPS OPHTHALMIC at 05:46

## 2020-05-26 RX ADMIN — HEPARIN SODIUM 7500 UNIT(S): 5000 INJECTION INTRAVENOUS; SUBCUTANEOUS at 21:58

## 2020-05-26 NOTE — PROGRESS NOTE ADULT - ATTENDING COMMENTS
Glucoses have been significantly better in the past 24 hours, with all values in the  range.  No changes in the insulin.  The decrease in the metformin dose is noted

## 2020-05-26 NOTE — PROGRESS NOTE ADULT - SUBJECTIVE AND OBJECTIVE BOX
INTERVAL HPI/OVERNIGHT EVENTS:    Spoke to patient via phone.   No acute events over the weekend.   Pt's metformin decreased to 500 BID- unclear why but pt reports no GI disturbances.   Saturating well on RA. appetite is good, complaining of food trays not being enough.   TSH was 2.6 and Free T4 0.84.   Sodium was 130 in the morning.     FSG & Insulin received:  Yesterday:  pre-dinner fs, 8 units lispro   bedtime fs, 18 lantus   units + 0 units lispro SS  Today:  pre-breakfast fs, did not receive lispro   pre-lunch fs 8 lispro     Pt reports the following symptoms:    CONSTITUTIONAL:  Negative fever or chills, feels well, good appetite  EYES:  Negative  blurry vision or double vision  CARDIOVASCULAR:  Negative for chest pain or palpitations  RESPIRATORY:  Negative for cough, wheezing, or SOB   GASTROINTESTINAL:  Negative for nausea, vomiting, diarrhea, constipation, or abdominal pain  GENITOURINARY:  Negative frequency, urgency or dysuria  NEUROLOGIC:  No headache, confusion, dizziness, lightheadedness    MEDICATIONS  (STANDING):  amLODIPine   Tablet 10 milliGRAM(s) Oral daily  AQUAPHOR (petrolatum Ointment) 1 Application(s) Topical two times a day  atorvastatin 10 milliGRAM(s) Oral at bedtime  baclofen 2.5 milliGRAM(s) Oral every 12 hours  brimonidine 0.2% Ophthalmic Solution 1 Drop(s) Both EYES three times a day  dextrose 5%. 1000 milliLiter(s) (50 mL/Hr) IV Continuous <Continuous>  dextrose 50% Injectable 12.5 Gram(s) IV Push once  dextrose 50% Injectable 25 Gram(s) IV Push once  dextrose 50% Injectable 25 Gram(s) IV Push once  diphenoxylate/atropine 1 Tablet(s) Oral daily  dorzolamide 2% Ophthalmic Solution 1 Drop(s) Both EYES three times a day  gabapentin 300 milliGRAM(s) Oral every 8 hours  heparin   Injectable 7500 Unit(s) SubCutaneous every 8 hours  insulin glargine Injectable (LANTUS) 18 Unit(s) SubCutaneous at bedtime  insulin lispro (HumaLOG) corrective regimen sliding scale   SubCutaneous Before meals and at bedtime  insulin lispro Injectable (HumaLOG) 8 Unit(s) SubCutaneous three times a day before meals  metFORMIN 500 milliGRAM(s) Oral every 12 hours  pantoprazole    Tablet 40 milliGRAM(s) Oral every 12 hours  rifAXIMin 550 milliGRAM(s) Oral two times a day  sodium bicarbonate 650 milliGRAM(s) Oral three times a day  sodium chloride 1 Gram(s) Oral three times a day  traZODone 100 milliGRAM(s) Oral at bedtime    MEDICATIONS  (PRN):  acetaminophen   Tablet .. 500 milliGRAM(s) Oral every 6 hours PRN Mild Pain (1 - 3), Moderate Pain (4 - 6)  dextrose 40% Gel 15 Gram(s) Oral once PRN Blood Glucose LESS THAN 70 milliGRAM(s)/deciliter  glucagon  Injectable 1 milliGRAM(s) IntraMuscular once PRN Glucose LESS THAN 70 milligrams/deciliter      PHYSICAL EXAM  Vital Signs Last 24 Hrs  T(C): 36.4 (26 May 2020 10:30), Max: 36.4 (26 May 2020 10:30)  T(F): 97.6 (26 May 2020 10:30), Max: 97.6 (26 May 2020 10:30)  HR: 70 (26 May 2020 10:30) (56 - 78)  BP: 159/70 (26 May 2020 10:30) (135/65 - 162/73)  BP(mean): --  RR: 20 (26 May 2020 10:30) (18 - 20)  SpO2: 100% (26 May 2020 10:30) (100% - 100%)    Constitutional: wn/wd in NAD.   HEENT: NCAT, MMM, OP clear, EOMI, no proptosis or lid retraction  Neck: no thyromegaly or palpable thyroid nodules   Respiratory: lungs CTAB.  Cardiovascular: regular rhythm, normal S1 and S2, no audible murmurs, no peripheral edema  GI: soft, NT/ND, no masses/HSM appreciated.  Neurology: no tremors, DTR 2+  Skin: no visible rashes/lesions  Psychiatric: AAO x 3, normal affect/mood.    LABS:        130<L>  |  95<L>  |  48<H>  ----------------------------<  151<H>  5.0   |  22  |  1.38<H>    Ca    10.1      26 May 2020 10:28          Thyroid Stimulating Hormone, Serum: 2.613 uIU/mL ( @ 10:28)  Thyroid Stimulating Hormone, Serum: 2.389 uIU/mL ( @ 07:38)  Thyroid Stimulating Hormone, Serum: 3.869 uIU/mL ( @ 06:50)  Thyroid Stimulating Hormone, Serum: 3.104 uIU/mL ( @ 06:10)  Thyroid Stimulating Hormone, Serum: 2.712 uIU/mL ( @ 06:31)  Thyroid Stimulating Hormone, Serum: 3.063 uIU/mL ( @ 06:31)  Thyroid Stimulating Hormone, Serum: 3.509 uIU/mL ( @ 06:07)  Thyroid Stimulating Hormone, Serum: 3.325 uIU/mL ( @ 06:17)  Thyroid Stimulating Hormone, Serum: 2.813 uIU/mL ( @ 07:28)  Thyroid Stimulating Hormone, Serum: 2.877 uIU/mL ( @ 00:34)      HbA1C: 6.9 % ( @ 06:59)  7.6 % ( @ 05:58)    CAPILLARY BLOOD GLUCOSE      POCT Blood Glucose.: 132 mg/dL (26 May 2020 11:38)  POCT Blood Glucose.: 146 mg/dL (26 May 2020 07:46)  POCT Blood Glucose.: 117 mg/dL (25 May 2020 21:58)  POCT Blood Glucose.: 114 mg/dL (25 May 2020 16:32)        A/P: 60y Male with history of DM type II presenting for       1.  DM -     Please continue           units lantus at bedtime  / in the morning   Continue       units lispro with meals   Cotninue lispro moderate / low dose sliding scale 4 times daily with meals and at bedtime.    Please continue consistent carbohydrate diet.    Goal FSG is   Will continue to monitor   For discharge, TBD    Pt can follow up at discharge with [facility] by calling  to make an appointment.   Will discuss case with     and update primary team INTERVAL HPI/OVERNIGHT EVENTS:    Spoke to patient via phone.   No acute events over the weekend.   Pt's metformin decreased to 500 BID- unclear why but pt reports no GI disturbances.   Saturating well on RA. appetite is good, complaining of food trays not being enough.   TSH was 2.6 and Free T4 0.84.   Sodium was 130 in the morning.     FSG & Insulin received:  Yesterday:  pre-dinner fs, 8 units lispro   bedtime fs, 18 lantus   units + 0 units lispro SS  Today:  pre-breakfast fs, did not receive lispro   pre-lunch fs 8 lispro     Pt reports the following symptoms:    CONSTITUTIONAL:  Negative fever or chills, feels well, good appetite  EYES:  Negative  blurry vision or double vision  CARDIOVASCULAR:  Negative for chest pain or palpitations  RESPIRATORY:  Negative for cough, wheezing, or SOB   GASTROINTESTINAL:  Negative for nausea, vomiting, diarrhea, constipation, or abdominal pain  GENITOURINARY:  Negative frequency, urgency or dysuria  NEUROLOGIC:  No headache, confusion, dizziness, lightheadedness    MEDICATIONS  (STANDING):  amLODIPine   Tablet 10 milliGRAM(s) Oral daily  AQUAPHOR (petrolatum Ointment) 1 Application(s) Topical two times a day  atorvastatin 10 milliGRAM(s) Oral at bedtime  baclofen 2.5 milliGRAM(s) Oral every 12 hours  brimonidine 0.2% Ophthalmic Solution 1 Drop(s) Both EYES three times a day  dextrose 5%. 1000 milliLiter(s) (50 mL/Hr) IV Continuous <Continuous>  dextrose 50% Injectable 12.5 Gram(s) IV Push once  dextrose 50% Injectable 25 Gram(s) IV Push once  dextrose 50% Injectable 25 Gram(s) IV Push once  diphenoxylate/atropine 1 Tablet(s) Oral daily  dorzolamide 2% Ophthalmic Solution 1 Drop(s) Both EYES three times a day  gabapentin 300 milliGRAM(s) Oral every 8 hours  heparin   Injectable 7500 Unit(s) SubCutaneous every 8 hours  insulin glargine Injectable (LANTUS) 18 Unit(s) SubCutaneous at bedtime  insulin lispro (HumaLOG) corrective regimen sliding scale   SubCutaneous Before meals and at bedtime  insulin lispro Injectable (HumaLOG) 8 Unit(s) SubCutaneous three times a day before meals  metFORMIN 500 milliGRAM(s) Oral every 12 hours  pantoprazole    Tablet 40 milliGRAM(s) Oral every 12 hours  rifAXIMin 550 milliGRAM(s) Oral two times a day  sodium bicarbonate 650 milliGRAM(s) Oral three times a day  sodium chloride 1 Gram(s) Oral three times a day  traZODone 100 milliGRAM(s) Oral at bedtime    MEDICATIONS  (PRN):  acetaminophen   Tablet .. 500 milliGRAM(s) Oral every 6 hours PRN Mild Pain (1 - 3), Moderate Pain (4 - 6)  dextrose 40% Gel 15 Gram(s) Oral once PRN Blood Glucose LESS THAN 70 milliGRAM(s)/deciliter  glucagon  Injectable 1 milliGRAM(s) IntraMuscular once PRN Glucose LESS THAN 70 milligrams/deciliter      PHYSICAL EXAM  Vital Signs Last 24 Hrs  T(C): 36.4 (26 May 2020 10:30), Max: 36.4 (26 May 2020 10:30)  T(F): 97.6 (26 May 2020 10:30), Max: 97.6 (26 May 2020 10:30)  HR: 70 (26 May 2020 10:30) (56 - 78)  BP: 159/70 (26 May 2020 10:30) (135/65 - 162/73)  BP(mean): --  RR: 20 (26 May 2020 10:30) (18 - 20)  SpO2: 100% (26 May 2020 10:30) (100% - 100%)    Due to the nature of this patient’s COVID-19 isolation status (either confirmed or suspected), this note was prepared without a bedside physical examination to prevent spread of infection and to conserve personal protective equipment during this nationwide pandemic. If possible, direct patient communication occurred via electronic measures or telephone conversation. Examination highlights were provided by a bedside nurse wearing personal protective equipment and review of pertinent medical records. Objective data (vital signs, urine output, lab results, imaging studies, medications, etc) were reviewed in detail. Face to face visits and physical examination have been limited only to patients for whom it is required for medical decision making.    LABS:        130<L>  |  95<L>  |  48<H>  ----------------------------<  151<H>  5.0   |  22  |  1.38<H>    Ca    10.1      26 May 2020 10:28    Thyroid Stimulating Hormone, Serum: 2.613 uIU/mL ( @ 10:28)  Thyroid Stimulating Hormone, Serum: 2.389 uIU/mL ( @ 07:38)  Thyroid Stimulating Hormone, Serum: 3.869 uIU/mL ( @ 06:50)  Thyroid Stimulating Hormone, Serum: 3.104 uIU/mL ( @ 06:10)  Thyroid Stimulating Hormone, Serum: 2.712 uIU/mL ( @ 06:31)  Thyroid Stimulating Hormone, Serum: 3.063 uIU/mL ( @ 06:31)  Thyroid Stimulating Hormone, Serum: 3.509 uIU/mL ( @ 06:07)  Thyroid Stimulating Hormone, Serum: 3.325 uIU/mL ( @ 06:17)  Thyroid Stimulating Hormone, Serum: 2.813 uIU/mL ( @ 07:28)  Thyroid Stimulating Hormone, Serum: 2.877 uIU/mL ( @ 00:34)      HbA1C: 6.9 % ( @ 06:59)  7.6 % ( @ 05:58)    CAPILLARY BLOOD GLUCOSE  POCT Blood Glucose.: 132 mg/dL (26 May 2020 11:38)  POCT Blood Glucose.: 146 mg/dL (26 May 2020 07:46)  POCT Blood Glucose.: 117 mg/dL (25 May 2020 21:58)  POCT Blood Glucose.: 114 mg/dL (25 May 2020 16:32)      A/P 60yMale with hx of DM now with COVID, worsening renal function, moderate hyperglycemia.     1.  DM: type 2  Please continue Lantus 18 units at bedtime  Please continue Lispro 8  units tid with meals.   Continue lispro moderate dose scale with meals and at bedtime.   Please continue metformin 500 mg BID - with breakfast and dinner.   Continue consistent carb diet  FSG Goal 100-180  GFR 40 and EF 65%    2.  Hyperlipidemia - goal LDL < 70  - on lipitor 10 mg once daily    For discharge, TBD    Case seen and discussed with  and updated the primary team.  Pt can follow up at discharge with [facility] by calling  to make an appointment.

## 2020-05-26 NOTE — PROGRESS NOTE ADULT - ASSESSMENT
60M PMH HTN, DM + neuropathy, IVDU, ETOH liver cirrhosis, presents to [facility] s/p fall at home; found with toxic megacolon and pneumatosis; now s/p ex lap + subtotal colectomy, ileostomy (3/31). Hospital course further complicated by COVID, MEG, anemia with suspicion for GI source/varices (refused further GI workup). Pt stable for discharge pending establishment of proper wound-care services.  COVID negative on 5/23

## 2020-05-26 NOTE — PROGRESS NOTE ADULT - SUBJECTIVE AND OBJECTIVE BOX
O/N Events:  LUCA  Subjective:  no issues per primary team, renal fx remained stable, sNa plateaued ~ 130    VITALS  Vital Signs Last 24 Hrs  T(C): 36.4 (26 May 2020 10:30), Max: 36.4 (26 May 2020 10:30)  T(F): 97.6 (26 May 2020 10:30), Max: 97.6 (26 May 2020 10:30)  HR: 70 (26 May 2020 10:30) (56 - 78)  BP: 159/70 (26 May 2020 10:30) (135/65 - 162/73)  RR: 20 (26 May 2020 10:30) (18 - 20)  SpO2: 100% (26 May 2020 10:30) (100% - 100%)    PHYSICAL EXAM  deferred per isolation protocol     MEDICATIONS  (STANDING):  amLODIPine   Tablet 10 milliGRAM(s) Oral daily  AQUAPHOR (petrolatum Ointment) 1 Application(s) Topical two times a day  atorvastatin 10 milliGRAM(s) Oral at bedtime  baclofen 2.5 milliGRAM(s) Oral every 12 hours  brimonidine 0.2% Ophthalmic Solution 1 Drop(s) Both EYES three times a day  dextrose 5%. 1000 milliLiter(s) (50 mL/Hr) IV Continuous <Continuous>  dextrose 50% Injectable 12.5 Gram(s) IV Push once  dextrose 50% Injectable 25 Gram(s) IV Push once  dextrose 50% Injectable 25 Gram(s) IV Push once  diphenoxylate/atropine 1 Tablet(s) Oral daily  dorzolamide 2% Ophthalmic Solution 1 Drop(s) Both EYES three times a day  gabapentin 300 milliGRAM(s) Oral every 8 hours  heparin   Injectable 7500 Unit(s) SubCutaneous every 8 hours  insulin glargine Injectable (LANTUS) 18 Unit(s) SubCutaneous at bedtime  insulin lispro (HumaLOG) corrective regimen sliding scale   SubCutaneous Before meals and at bedtime  insulin lispro Injectable (HumaLOG) 8 Unit(s) SubCutaneous three times a day before meals  metFORMIN 500 milliGRAM(s) Oral every 12 hours  pantoprazole    Tablet 40 milliGRAM(s) Oral every 12 hours  rifAXIMin 550 milliGRAM(s) Oral two times a day  sodium bicarbonate 650 milliGRAM(s) Oral three times a day  sodium chloride 1 Gram(s) Oral three times a day  traZODone 100 milliGRAM(s) Oral at bedtime    MEDICATIONS  (PRN):  acetaminophen   Tablet .. 500 milliGRAM(s) Oral every 6 hours PRN Mild Pain (1 - 3), Moderate Pain (4 - 6)  dextrose 40% Gel 15 Gram(s) Oral once PRN Blood Glucose LESS THAN 70 milliGRAM(s)/deciliter  glucagon  Injectable 1 milliGRAM(s) IntraMuscular once PRN Glucose LESS THAN 70 milligrams/deciliter      LABS    05-26    130<L>  |  95<L>  |  48<H>  ----------------------------<  151<H>  5.0   |  22  |  1.38<H>    Ca    10.1      26 May 2020 10:28

## 2020-05-26 NOTE — PROGRESS NOTE ADULT - PROBLEM SELECTOR PLAN 1
given low Urinary Na hyponatremia attributed to relative hypovolemia from diuretic regimen which is currently on hold  pt continued on 1 L fluid restriction and 1 G salt tabs TID with stable sNa ~ 130  - please obtain urine Na and Osm   Will follow

## 2020-05-26 NOTE — PROGRESS NOTE ADULT - PROBLEM SELECTOR PLAN 5
cont. basal-bolus insulin + metformin, per Endocrine recs; cont. Neurontin for diabetic neuropathy; cont. diabetic diet

## 2020-05-26 NOTE — PROGRESS NOTE ADULT - PROBLEM SELECTOR PLAN 2
likely multifactorial; renal fxn has returned to baseline; appreciate Renal recs; cont. renal diet, sodium bicarb; monitor BMP, renally-dose rx

## 2020-05-26 NOTE — PROGRESS NOTE ADULT - SUBJECTIVE AND OBJECTIVE BOX
OVERNIGHT EVENTS: Patient reported dark ostomy output. VSS   SUBJECTIVE: Patient seen and examined at the bedside. Patient notes dark ostomy output since last night. Otherwise without new complaints-Denies new abdominal pain, dizziness, palpitations, sob. 12-point ROS reviewed and is otherwise negative.    Vital Signs Last 12 Hrs  T(F): 97.4 (05-26-20 @ 06:01), Max: 97.4 (05-25-20 @ 21:07)  HR: 56 (05-26-20 @ 06:01) (56 - 78)  BP: 162/73 (05-26-20 @ 06:01) (135/65 - 162/73)  BP(mean): --  RR: 18 (05-26-20 @ 06:01) (18 - 18)  SpO2: 100% (05-26-20 @ 06:01) (100% - 100%)    I&O's Summary  25 May 2020 07:01  -  26 May 2020 07:00  --------------------------------------------------------  IN: 0 mL / OUT: 550 mL / NET: -550 mL    PHYSICAL EXAM:  General: NAD, resting comfortably in bed, AAOx3  HEENT: NCAT, EOMI, MMM, no conjunctival pallor  Neck: supple  Respiratory: CTA, normal respiratory rate/depth/effort  Cardiovascular: normal S1/S2, RRR, no MRG  Vascular: 2+ radial/DP pulses b/l  Abdomen: nontender to palpation throughout, soft, ileostomy output with dark green liquid, nonbloody; midline wound dressing c/d/i  Extremities: WWP, no peripheral edema    LABS:    05-25    129<L>  |  95<L>  |  51<H>  ----------------------------<  75  4.9   |  19<L>  |  1.52<H>    Ca    9.8      25 May 2020 10:04      RADIOLOGY & ADDITIONAL TESTS: Reviewed    MEDICATIONS  (STANDING):  amLODIPine   Tablet 10 milliGRAM(s) Oral daily  AQUAPHOR (petrolatum Ointment) 1 Application(s) Topical two times a day  atorvastatin 10 milliGRAM(s) Oral at bedtime  baclofen 2.5 milliGRAM(s) Oral every 12 hours  brimonidine 0.2% Ophthalmic Solution 1 Drop(s) Both EYES three times a day  dextrose 5%. 1000 milliLiter(s) (50 mL/Hr) IV Continuous <Continuous>  dextrose 50% Injectable 12.5 Gram(s) IV Push once  dextrose 50% Injectable 25 Gram(s) IV Push once  dextrose 50% Injectable 25 Gram(s) IV Push once  diphenoxylate/atropine 1 Tablet(s) Oral daily  dorzolamide 2% Ophthalmic Solution 1 Drop(s) Both EYES three times a day  gabapentin 300 milliGRAM(s) Oral every 8 hours  heparin   Injectable 7500 Unit(s) SubCutaneous every 8 hours  insulin glargine Injectable (LANTUS) 18 Unit(s) SubCutaneous at bedtime  insulin lispro (HumaLOG) corrective regimen sliding scale   SubCutaneous Before meals and at bedtime  insulin lispro Injectable (HumaLOG) 8 Unit(s) SubCutaneous three times a day before meals  metFORMIN 500 milliGRAM(s) Oral every 12 hours  pantoprazole    Tablet 40 milliGRAM(s) Oral every 12 hours  rifAXIMin 550 milliGRAM(s) Oral two times a day  sodium bicarbonate 650 milliGRAM(s) Oral three times a day  sodium chloride 1 Gram(s) Oral three times a day  traZODone 100 milliGRAM(s) Oral at bedtime    MEDICATIONS  (PRN):  acetaminophen   Tablet .. 500 milliGRAM(s) Oral every 6 hours PRN Mild Pain (1 - 3), Moderate Pain (4 - 6)  dextrose 40% Gel 15 Gram(s) Oral once PRN Blood Glucose LESS THAN 70 milliGRAM(s)/deciliter  glucagon  Injectable 1 milliGRAM(s) IntraMuscular once PRN Glucose LESS THAN 70 milligrams/deciliter

## 2020-05-26 NOTE — ADVANCED PRACTICE NURSE CONSULT - ASSESSMENT
New dressing applied over surgical wounds on mid-abdomen. Lower incision 100% granulated with scabbing beginning to occur over wound bed. Superior incision still has small opening, approx 0.3x0.3 cm with fibrinous exudate at base. New 1 3/4" Angi 2 piece appliance placed over stoma, encouraged pt to remember to use a ring to prevent leakages. Stool light brown, semi-thick, peristomal area with slight erythema.

## 2020-05-26 NOTE — PROGRESS NOTE ADULT - ATTENDING COMMENTS
SIADH with stable Na on 1g TID Natabs, fluid restriction <1L  HTN well controlled  MEG and metabolic acidosis improving

## 2020-05-26 NOTE — PROGRESS NOTE ADULT - SUBJECTIVE AND OBJECTIVE BOX
Patient is a 60y old  Male who presents with a chief complaint of COVID (25 May 2020 08:33)      INTERVAL HPI/OVERNIGHT EVENTS:    Pt. seen and examined earlier today  Pt. feels well, no complaints  Pt. happy that his recent COVID PCR is negative    Review of Systems: 12 point review of systems otherwise negative    MEDICATIONS  (STANDING):  amLODIPine   Tablet 10 milliGRAM(s) Oral daily  AQUAPHOR (petrolatum Ointment) 1 Application(s) Topical two times a day  atorvastatin 10 milliGRAM(s) Oral at bedtime  baclofen 2.5 milliGRAM(s) Oral every 12 hours  brimonidine 0.2% Ophthalmic Solution 1 Drop(s) Both EYES three times a day  dextrose 5%. 1000 milliLiter(s) (50 mL/Hr) IV Continuous <Continuous>  dextrose 50% Injectable 12.5 Gram(s) IV Push once  dextrose 50% Injectable 25 Gram(s) IV Push once  dextrose 50% Injectable 25 Gram(s) IV Push once  diphenoxylate/atropine 1 Tablet(s) Oral daily  dorzolamide 2% Ophthalmic Solution 1 Drop(s) Both EYES three times a day  gabapentin 300 milliGRAM(s) Oral every 8 hours  heparin   Injectable 7500 Unit(s) SubCutaneous every 8 hours  insulin glargine Injectable (LANTUS) 18 Unit(s) SubCutaneous at bedtime  insulin lispro (HumaLOG) corrective regimen sliding scale   SubCutaneous Before meals and at bedtime  insulin lispro Injectable (HumaLOG) 8 Unit(s) SubCutaneous three times a day before meals  metFORMIN 500 milliGRAM(s) Oral every 12 hours  pantoprazole    Tablet 40 milliGRAM(s) Oral every 12 hours  rifAXIMin 550 milliGRAM(s) Oral two times a day  sodium bicarbonate 650 milliGRAM(s) Oral three times a day  sodium chloride 1 Gram(s) Oral three times a day  traZODone 100 milliGRAM(s) Oral at bedtime    MEDICATIONS  (PRN):  acetaminophen   Tablet .. 500 milliGRAM(s) Oral every 6 hours PRN Mild Pain (1 - 3), Moderate Pain (4 - 6)  dextrose 40% Gel 15 Gram(s) Oral once PRN Blood Glucose LESS THAN 70 milliGRAM(s)/deciliter  glucagon  Injectable 1 milliGRAM(s) IntraMuscular once PRN Glucose LESS THAN 70 milligrams/deciliter      Allergies    No Known Allergies    Intolerances          Vital Signs Last 24 Hrs  T(C): 36.4 (26 May 2020 10:30), Max: 37.1 (25 May 2020 12:11)  T(F): 97.6 (26 May 2020 10:30), Max: 98.7 (25 May 2020 12:11)  HR: 70 (26 May 2020 10:30) (56 - 78)  BP: 159/70 (26 May 2020 10:30) (125/67 - 162/73)  BP(mean): --  RR: 20 (26 May 2020 10:30) (18 - 20)  SpO2: 100% (26 May 2020 10:30) (100% - 100%)  CAPILLARY BLOOD GLUCOSE      POCT Blood Glucose.: 146 mg/dL (26 May 2020 07:46)  POCT Blood Glucose.: 117 mg/dL (25 May 2020 21:58)  POCT Blood Glucose.: 114 mg/dL (25 May 2020 16:32)  POCT Blood Glucose.: 73 mg/dL (25 May 2020 11:47)      05-25 @ 07:01  -  05-26 @ 07:00  --------------------------------------------------------  IN: 0 mL / OUT: 550 mL / NET: -550 mL        Physical Exam:  (earlier today)  Daily     Daily   General: well-appearing in NAD  HEENT:  MMM  Abdomen:  +ileostomy, dressing C/D/I  Neuro:  AAOx3  LABS:    05-26    130<L>  |  95<L>  |  48<H>  ----------------------------<  151<H>  5.0   |  22  |  1.38<H>    Ca    10.1      26 May 2020 10:28              RADIOLOGY & ADDITIONAL TESTS:    ---------------------------------------------------------------------------  I personally reviewed: [  ]EKG   [  ]CXR    [  ] CT    [  ]Other  ---------------------------------------------------------------------------  PLEASE CHECK WHEN PRESENT:     [  ]Heart Failure     [  ] Acute     [  ] Acute on Chronic     [  ] Chronic  -------------------------------------------------------------------     [  ]Diastolic [HFpEF]     [  ]Systolic [HFrEF]     [  ]Combined [HFpEF & HFrEF]     [  ]Other:  -------------------------------------------------------------------  [  ]MEG     [  ]ATN     [  ]Reneal Medullary Necrosis     [  ]Renal Cortical Necrosis     [  ]Other Pathological Lesions:    [  ]CKD 1  [  ]CKD 2  [  ]CKD 3  [  ]CKD 4  [  ]CKD 5  [  ]Other  -------------------------------------------------------------------  [  ]Other/Unspecified:    --------------------------------------------------------------------    Abdominal Nutritional Status  [  ]Malnutrition: See Nutrition Note  [  ]Cachexia  [  ]Other:   [  ]Supplement Ordered:  [  ]Morbid Obesity (BMI >=40]

## 2020-05-27 DIAGNOSIS — E83.42 HYPOMAGNESEMIA: ICD-10-CM

## 2020-05-27 LAB
ANION GAP SERPL CALC-SCNC: 12 MMOL/L — SIGNIFICANT CHANGE UP (ref 5–17)
BUN SERPL-MCNC: 42 MG/DL — HIGH (ref 7–23)
CALCIUM SERPL-MCNC: 9.6 MG/DL — SIGNIFICANT CHANGE UP (ref 8.4–10.5)
CHLORIDE SERPL-SCNC: 98 MMOL/L — SIGNIFICANT CHANGE UP (ref 96–108)
CO2 SERPL-SCNC: 21 MMOL/L — LOW (ref 22–31)
CREAT SERPL-MCNC: 1.36 MG/DL — HIGH (ref 0.5–1.3)
GLUCOSE BLDC GLUCOMTR-MCNC: 132 MG/DL — HIGH (ref 70–99)
GLUCOSE BLDC GLUCOMTR-MCNC: 147 MG/DL — HIGH (ref 70–99)
GLUCOSE BLDC GLUCOMTR-MCNC: 153 MG/DL — HIGH (ref 70–99)
GLUCOSE BLDC GLUCOMTR-MCNC: 192 MG/DL — HIGH (ref 70–99)
GLUCOSE SERPL-MCNC: 119 MG/DL — HIGH (ref 70–99)
HCT VFR BLD CALC: 26.9 % — LOW (ref 39–50)
HGB BLD-MCNC: 8.9 G/DL — LOW (ref 13–17)
MAGNESIUM SERPL-MCNC: 1.5 MG/DL — LOW (ref 1.6–2.6)
MCHC RBC-ENTMCNC: 29.7 PG — SIGNIFICANT CHANGE UP (ref 27–34)
MCHC RBC-ENTMCNC: 33.1 GM/DL — SIGNIFICANT CHANGE UP (ref 32–36)
MCV RBC AUTO: 89.7 FL — SIGNIFICANT CHANGE UP (ref 80–100)
NRBC # BLD: 0 /100 WBCS — SIGNIFICANT CHANGE UP (ref 0–0)
OSMOLALITY UR: 295 MOSMOL/KG — SIGNIFICANT CHANGE UP (ref 100–650)
PLATELET # BLD AUTO: 196 K/UL — SIGNIFICANT CHANGE UP (ref 150–400)
POTASSIUM SERPL-MCNC: 4 MMOL/L — SIGNIFICANT CHANGE UP (ref 3.5–5.3)
POTASSIUM SERPL-SCNC: 4 MMOL/L — SIGNIFICANT CHANGE UP (ref 3.5–5.3)
RBC # BLD: 3 M/UL — LOW (ref 4.2–5.8)
RBC # FLD: 15.7 % — HIGH (ref 10.3–14.5)
SODIUM SERPL-SCNC: 131 MMOL/L — LOW (ref 135–145)
SODIUM UR-SCNC: <20 MMOL/L — SIGNIFICANT CHANGE UP
WBC # BLD: 10.26 K/UL — SIGNIFICANT CHANGE UP (ref 3.8–10.5)
WBC # FLD AUTO: 10.26 K/UL — SIGNIFICANT CHANGE UP (ref 3.8–10.5)

## 2020-05-27 PROCEDURE — 99232 SBSQ HOSP IP/OBS MODERATE 35: CPT | Mod: GC

## 2020-05-27 PROCEDURE — 99232 SBSQ HOSP IP/OBS MODERATE 35: CPT

## 2020-05-27 PROCEDURE — 99231 SBSQ HOSP IP/OBS SF/LOW 25: CPT | Mod: GC

## 2020-05-27 RX ORDER — MAGNESIUM SULFATE 500 MG/ML
4 VIAL (ML) INJECTION ONCE
Refills: 0 | Status: COMPLETED | OUTPATIENT
Start: 2020-05-27 | End: 2020-05-27

## 2020-05-27 RX ORDER — SODIUM CHLORIDE 9 MG/ML
500 INJECTION INTRAMUSCULAR; INTRAVENOUS; SUBCUTANEOUS ONCE
Refills: 0 | Status: COMPLETED | OUTPATIENT
Start: 2020-05-27 | End: 2020-05-27

## 2020-05-27 RX ORDER — LOPERAMIDE HCL 2 MG
2 TABLET ORAL DAILY
Refills: 0 | Status: DISCONTINUED | OUTPATIENT
Start: 2020-05-27 | End: 2020-06-05

## 2020-05-27 RX ADMIN — AMLODIPINE BESYLATE 10 MILLIGRAM(S): 2.5 TABLET ORAL at 05:21

## 2020-05-27 RX ADMIN — Medication 1 APPLICATION(S): at 05:21

## 2020-05-27 RX ADMIN — Medication 100 MILLIGRAM(S): at 22:04

## 2020-05-27 RX ADMIN — PANTOPRAZOLE SODIUM 40 MILLIGRAM(S): 20 TABLET, DELAYED RELEASE ORAL at 05:20

## 2020-05-27 RX ADMIN — Medication 8 UNIT(S): at 12:12

## 2020-05-27 RX ADMIN — Medication 1 APPLICATION(S): at 18:54

## 2020-05-27 RX ADMIN — METFORMIN HYDROCHLORIDE 500 MILLIGRAM(S): 850 TABLET ORAL at 17:43

## 2020-05-27 RX ADMIN — PANTOPRAZOLE SODIUM 40 MILLIGRAM(S): 20 TABLET, DELAYED RELEASE ORAL at 17:41

## 2020-05-27 RX ADMIN — DORZOLAMIDE HYDROCHLORIDE 1 DROP(S): 20 SOLUTION/ DROPS OPHTHALMIC at 22:05

## 2020-05-27 RX ADMIN — SODIUM CHLORIDE 1000 MILLILITER(S): 9 INJECTION INTRAMUSCULAR; INTRAVENOUS; SUBCUTANEOUS at 15:57

## 2020-05-27 RX ADMIN — HEPARIN SODIUM 7500 UNIT(S): 5000 INJECTION INTRAVENOUS; SUBCUTANEOUS at 22:03

## 2020-05-27 RX ADMIN — DORZOLAMIDE HYDROCHLORIDE 1 DROP(S): 20 SOLUTION/ DROPS OPHTHALMIC at 14:18

## 2020-05-27 RX ADMIN — SODIUM CHLORIDE 1 GRAM(S): 9 INJECTION INTRAMUSCULAR; INTRAVENOUS; SUBCUTANEOUS at 05:21

## 2020-05-27 RX ADMIN — Medication 2: at 17:40

## 2020-05-27 RX ADMIN — BRIMONIDINE TARTRATE 1 DROP(S): 2 SOLUTION/ DROPS OPHTHALMIC at 22:05

## 2020-05-27 RX ADMIN — Medication 2 MILLIGRAM(S): at 17:42

## 2020-05-27 RX ADMIN — Medication 8 UNIT(S): at 08:32

## 2020-05-27 RX ADMIN — GABAPENTIN 300 MILLIGRAM(S): 400 CAPSULE ORAL at 22:04

## 2020-05-27 RX ADMIN — Medication 2.5 MILLIGRAM(S): at 17:42

## 2020-05-27 RX ADMIN — GABAPENTIN 300 MILLIGRAM(S): 400 CAPSULE ORAL at 05:21

## 2020-05-27 RX ADMIN — DORZOLAMIDE HYDROCHLORIDE 1 DROP(S): 20 SOLUTION/ DROPS OPHTHALMIC at 05:22

## 2020-05-27 RX ADMIN — Medication 650 MILLIGRAM(S): at 22:08

## 2020-05-27 RX ADMIN — SODIUM CHLORIDE 1 GRAM(S): 9 INJECTION INTRAMUSCULAR; INTRAVENOUS; SUBCUTANEOUS at 23:06

## 2020-05-27 RX ADMIN — HEPARIN SODIUM 7500 UNIT(S): 5000 INJECTION INTRAVENOUS; SUBCUTANEOUS at 05:20

## 2020-05-27 RX ADMIN — INSULIN GLARGINE 18 UNIT(S): 100 INJECTION, SOLUTION SUBCUTANEOUS at 22:38

## 2020-05-27 RX ADMIN — BRIMONIDINE TARTRATE 1 DROP(S): 2 SOLUTION/ DROPS OPHTHALMIC at 14:18

## 2020-05-27 RX ADMIN — Medication 2: at 22:38

## 2020-05-27 RX ADMIN — HEPARIN SODIUM 7500 UNIT(S): 5000 INJECTION INTRAVENOUS; SUBCUTANEOUS at 14:30

## 2020-05-27 RX ADMIN — Medication 100 GRAM(S): at 12:13

## 2020-05-27 RX ADMIN — Medication 650 MILLIGRAM(S): at 05:20

## 2020-05-27 RX ADMIN — Medication 2.5 MILLIGRAM(S): at 05:21

## 2020-05-27 RX ADMIN — SODIUM CHLORIDE 1 GRAM(S): 9 INJECTION INTRAMUSCULAR; INTRAVENOUS; SUBCUTANEOUS at 14:31

## 2020-05-27 RX ADMIN — METFORMIN HYDROCHLORIDE 500 MILLIGRAM(S): 850 TABLET ORAL at 05:21

## 2020-05-27 RX ADMIN — ATORVASTATIN CALCIUM 10 MILLIGRAM(S): 80 TABLET, FILM COATED ORAL at 22:03

## 2020-05-27 RX ADMIN — Medication 650 MILLIGRAM(S): at 14:31

## 2020-05-27 RX ADMIN — GABAPENTIN 300 MILLIGRAM(S): 400 CAPSULE ORAL at 14:29

## 2020-05-27 RX ADMIN — BRIMONIDINE TARTRATE 1 DROP(S): 2 SOLUTION/ DROPS OPHTHALMIC at 05:22

## 2020-05-27 RX ADMIN — Medication 8 UNIT(S): at 17:39

## 2020-05-27 NOTE — PROGRESS NOTE ADULT - PROBLEM SELECTOR PLAN 7
Type 2 DM. A1C 6.9% on 4/5.  - continue Lantus 18 units in the evening  - continue Lispro 8 units TID pre-meal   - continue mISS  - continue Metformin 500 mg BID  - f/u endo recs    #Hypothyroidism   TSH 2.813; TPO and antithyroid Abs neg. Levothyroxine 50 mcg discontinued per endocrinology 4/29 given low concern for autoimmune hypothyroidism at this time; will assess necessity of continuation of levothyroxine.  - f/u endo recs

## 2020-05-27 NOTE — PROGRESS NOTE ADULT - SUBJECTIVE AND OBJECTIVE BOX
Patient is a 60y old  Male who presents with a chief complaint of COVID (25 May 2020 08:33)      INTERVAL HPI/OVERNIGHT EVENTS:    Pt. seen and examined earlier today  No acute medical complaints  OOB and ambulating in room    Review of Systems: 12 point review of systems otherwise negative    MEDICATIONS  (STANDING):  amLODIPine   Tablet 10 milliGRAM(s) Oral daily  AQUAPHOR (petrolatum Ointment) 1 Application(s) Topical two times a day  atorvastatin 10 milliGRAM(s) Oral at bedtime  baclofen 2.5 milliGRAM(s) Oral every 12 hours  brimonidine 0.2% Ophthalmic Solution 1 Drop(s) Both EYES three times a day  dextrose 5%. 1000 milliLiter(s) (50 mL/Hr) IV Continuous <Continuous>  dextrose 50% Injectable 12.5 Gram(s) IV Push once  dextrose 50% Injectable 25 Gram(s) IV Push once  dextrose 50% Injectable 25 Gram(s) IV Push once  dorzolamide 2% Ophthalmic Solution 1 Drop(s) Both EYES three times a day  gabapentin 300 milliGRAM(s) Oral every 8 hours  heparin   Injectable 7500 Unit(s) SubCutaneous every 8 hours  insulin glargine Injectable (LANTUS) 18 Unit(s) SubCutaneous at bedtime  insulin lispro (HumaLOG) corrective regimen sliding scale   SubCutaneous Before meals and at bedtime  insulin lispro Injectable (HumaLOG) 8 Unit(s) SubCutaneous three times a day before meals  loperamide 2 milliGRAM(s) Oral daily  metFORMIN 500 milliGRAM(s) Oral every 12 hours  pantoprazole    Tablet 40 milliGRAM(s) Oral every 12 hours  rifAXIMin 550 milliGRAM(s) Oral two times a day  sodium bicarbonate 650 milliGRAM(s) Oral three times a day  sodium chloride 1 Gram(s) Oral three times a day  traZODone 100 milliGRAM(s) Oral at bedtime    MEDICATIONS  (PRN):  acetaminophen   Tablet .. 500 milliGRAM(s) Oral every 6 hours PRN Mild Pain (1 - 3), Moderate Pain (4 - 6)  dextrose 40% Gel 15 Gram(s) Oral once PRN Blood Glucose LESS THAN 70 milliGRAM(s)/deciliter  glucagon  Injectable 1 milliGRAM(s) IntraMuscular once PRN Glucose LESS THAN 70 milligrams/deciliter      Allergies    No Known Allergies    Intolerances          Vital Signs Last 24 Hrs  T(C): 37 (27 May 2020 05:59), Max: 37 (27 May 2020 05:59)  T(F): 98.6 (27 May 2020 05:59), Max: 98.6 (27 May 2020 05:59)  HR: 60 (27 May 2020 05:59) (60 - 72)  BP: 130/67 (27 May 2020 05:59) (129/82 - 130/67)  BP(mean): --  RR: 18 (27 May 2020 05:59) (18 - 18)  SpO2: 98% (27 May 2020 05:59) (98% - 99%)  CAPILLARY BLOOD GLUCOSE      POCT Blood Glucose.: 147 mg/dL (27 May 2020 11:55)  POCT Blood Glucose.: 132 mg/dL (27 May 2020 07:50)  POCT Blood Glucose.: 279 mg/dL (26 May 2020 22:07)  POCT Blood Glucose.: 149 mg/dL (26 May 2020 16:42)        Physical Exam:  (this morning)  Daily     Daily   General: well-appearing in NAD  HEENT:  MMM  Abdomen:  +ileostomy, dressing C/D/I  Neuro:  AAOx3    LABS:                        8.9    10.26 )-----------( 196      ( 27 May 2020 10:23 )             26.9     05-27    131<L>  |  98  |  42<H>  ----------------------------<  119<H>  4.0   |  21<L>  |  1.36<H>    Ca    9.6      27 May 2020 10:23  Mg     1.5     05-27              RADIOLOGY & ADDITIONAL TESTS:    ---------------------------------------------------------------------------  I personally reviewed: [  ]EKG   [  ]CXR    [  ] CT    [  ]Other  ---------------------------------------------------------------------------  PLEASE CHECK WHEN PRESENT:     [  ]Heart Failure     [  ] Acute     [  ] Acute on Chronic     [  ] Chronic  -------------------------------------------------------------------     [  ]Diastolic [HFpEF]     [  ]Systolic [HFrEF]     [  ]Combined [HFpEF & HFrEF]     [  ]Other:  -------------------------------------------------------------------  [  ]MEG     [  ]ATN     [  ]Reneal Medullary Necrosis     [  ]Renal Cortical Necrosis     [  ]Other Pathological Lesions:    [  ]CKD 1  [  ]CKD 2  [  ]CKD 3  [  ]CKD 4  [  ]CKD 5  [  ]Other  -------------------------------------------------------------------  [  ]Other/Unspecified:    --------------------------------------------------------------------    Abdominal Nutritional Status  [  ]Malnutrition: See Nutrition Note  [  ]Cachexia  [  ]Other:   [  ]Supplement Ordered:  [  ]Morbid Obesity (BMI >=40]

## 2020-05-27 NOTE — PROGRESS NOTE ADULT - PROBLEM SELECTOR PLAN 1
4/12 COVID PCR (+); 5/15 repeat PCR (+); 5/23 repeat PCR negative. S/p hydroxychloroquine + azithromycin 4/13-4/16. Stable on room air.  - continue supportive care  - contact/droplet precautions

## 2020-05-27 NOTE — PROGRESS NOTE ADULT - ATTENDING COMMENTS
Continues on maintenance insulin plus a small dose of metformin.  Glucoses have been fine except for a spike to 279 last night after he had cookies brought in by a friend.  He continues to ask for more food  Will continue the current regimen for now.  Plan was to discharge on oral agents, but the pt is now asking to continue insulin as outpatient, says that he did this before.  Will need to check whether this is OK for the shelter where he will be going

## 2020-05-27 NOTE — PROGRESS NOTE ADULT - PROBLEM SELECTOR PLAN 1
Hypovolemic hyponatrmia  given low Urinary Na hyponatremia attributed to relative hypovolemia   pt continued on 1 L fluid restriction and 1 g salt tabs TID with stable sNa ~ 130-131   - please administer 500 cc NS bolus and repeat urine Na and Osm with am labs   - replete Mg  and c/w Nabicarb 650 mg TID   Will follow

## 2020-05-27 NOTE — PROGRESS NOTE ADULT - PROBLEM SELECTOR PLAN 3
S/p ex lap with subtotal colectomy+ileostomy on 3/31. Course c/b fever and purulent wound infection (4/9) s/p vanc/zosyn, now resolved  - daily dressings for midline incision  - ileostomy in place draining brown stool with no signs of bleeding  - discontinued Lomotil  - started Loperamide 2 mg daily (will be able to get 7 days of meds on d/c)  - general surgery following, appreciate recs  - wound care

## 2020-05-27 NOTE — PROGRESS NOTE ADULT - SUBJECTIVE AND OBJECTIVE BOX
O/N Events:  LUCA  Subjective:  renal fx continues to improve slowly, sNa stable 131/ low urinary sodium < 20     VITALS  Vital Signs Last 24 Hrs  T(C): 36.2 (27 May 2020 14:21), Max: 37 (27 May 2020 05:59)  T(F): 97.2 (27 May 2020 14:21), Max: 98.6 (27 May 2020 05:59)  HR: 68 (27 May 2020 14:21) (60 - 72)  BP: 122/56 (27 May 2020 14:21) (122/56 - 130/67)  RR: 18 (27 May 2020 14:21) (18 - 18)  SpO2: 98% (27 May 2020 14:21) (98% - 99%)    PHYSICAL EXAM  Deferred per isolation protocol     MEDICATIONS  (STANDING):  amLODIPine   Tablet 10 milliGRAM(s) Oral daily  AQUAPHOR (petrolatum Ointment) 1 Application(s) Topical two times a day  atorvastatin 10 milliGRAM(s) Oral at bedtime  baclofen 2.5 milliGRAM(s) Oral every 12 hours  brimonidine 0.2% Ophthalmic Solution 1 Drop(s) Both EYES three times a day  dextrose 5%. 1000 milliLiter(s) (50 mL/Hr) IV Continuous <Continuous>  dextrose 50% Injectable 12.5 Gram(s) IV Push once  dextrose 50% Injectable 25 Gram(s) IV Push once  dextrose 50% Injectable 25 Gram(s) IV Push once  dorzolamide 2% Ophthalmic Solution 1 Drop(s) Both EYES three times a day  gabapentin 300 milliGRAM(s) Oral every 8 hours  heparin   Injectable 7500 Unit(s) SubCutaneous every 8 hours  insulin glargine Injectable (LANTUS) 18 Unit(s) SubCutaneous at bedtime  insulin lispro (HumaLOG) corrective regimen sliding scale   SubCutaneous Before meals and at bedtime  insulin lispro Injectable (HumaLOG) 8 Unit(s) SubCutaneous three times a day before meals  loperamide 2 milliGRAM(s) Oral daily  metFORMIN 500 milliGRAM(s) Oral every 12 hours  pantoprazole    Tablet 40 milliGRAM(s) Oral every 12 hours  rifAXIMin 550 milliGRAM(s) Oral two times a day  sodium bicarbonate 650 milliGRAM(s) Oral three times a day  sodium chloride 1 Gram(s) Oral three times a day  traZODone 100 milliGRAM(s) Oral at bedtime    MEDICATIONS  (PRN):  acetaminophen   Tablet .. 500 milliGRAM(s) Oral every 6 hours PRN Mild Pain (1 - 3), Moderate Pain (4 - 6)  dextrose 40% Gel 15 Gram(s) Oral once PRN Blood Glucose LESS THAN 70 milliGRAM(s)/deciliter  glucagon  Injectable 1 milliGRAM(s) IntraMuscular once PRN Glucose LESS THAN 70 milligrams/deciliter      LABS                        8.9    10.26 )-----------( 196      ( 27 May 2020 10:23 )             26.9     05-27    131<L>  |  98  |  42<H>  ----------------------------<  119<H>  4.0   |  21<L>  |  1.36<H>    Ca    9.6      27 May 2020 10:23  Mg     1.5     05-27

## 2020-05-27 NOTE — PROGRESS NOTE ADULT - PROBLEM SELECTOR PLAN 8
Home regimen: Amlodipine 10 mg qd, HCTZ, Lisinopril. BP currently well-controlled.  - continue Amlodipine 10 mg daily  - holding HCTZ/Lisinopril in setting of MEG  - monitor BP    #HLD  On Pravastatin 40 mg qhs at home.  - continue Atorvastatin 10 mg at bedtime (therapeutic interchange)

## 2020-05-27 NOTE — PROGRESS NOTE ADULT - PROBLEM SELECTOR PLAN 2
likely multifactorial; renal fxn has returned to baseline; appreciate Renal recs; cont. renal diet, sodium bicarb; monitor BMP off diuretics, renally-dose rx

## 2020-05-27 NOTE — PROGRESS NOTE ADULT - SUBJECTIVE AND OBJECTIVE BOX
INTERVAL HPI/OVERNIGHT EVENTS:    Patient is a 60y old  Male who presents with a chief complaint of COVID (25 May 2020 08:33)  Spoke to patient via phone.   No acute events o/n.   Saturating well on RA. appetite is good, continues to complain of food trays not being enough.   Planned for d/c to shelter on friday.   States he had cookies last night before dinner that his friend brought him.     FSG & Insulin received:  Yesterday:  pre-dinner fs, 8 units lispro   bedtime fs, 18 lantus   units + 6 units lispro SS  Today:  pre-breakfast fs, 8 units lispro   pre-lunch fs 8 lispro     Pt reports the following symptoms:    CONSTITUTIONAL:  Negative fever or chills, feels well, good appetite  EYES:  Negative  blurry vision or double vision  CARDIOVASCULAR:  Negative for chest pain or palpitations  RESPIRATORY:  Negative for cough, wheezing, or SOB   GASTROINTESTINAL:  Negative for nausea, vomiting, diarrhea, constipation, or abdominal pain  GENITOURINARY:  Negative frequency, urgency or dysuria  NEUROLOGIC:  No headache, confusion, dizziness, lightheadedness    MEDICATIONS  (STANDING):  amLODIPine   Tablet 10 milliGRAM(s) Oral daily  AQUAPHOR (petrolatum Ointment) 1 Application(s) Topical two times a day  atorvastatin 10 milliGRAM(s) Oral at bedtime  baclofen 2.5 milliGRAM(s) Oral every 12 hours  brimonidine 0.2% Ophthalmic Solution 1 Drop(s) Both EYES three times a day  dextrose 5%. 1000 milliLiter(s) (50 mL/Hr) IV Continuous <Continuous>  dextrose 50% Injectable 12.5 Gram(s) IV Push once  dextrose 50% Injectable 25 Gram(s) IV Push once  dextrose 50% Injectable 25 Gram(s) IV Push once  dorzolamide 2% Ophthalmic Solution 1 Drop(s) Both EYES three times a day  gabapentin 300 milliGRAM(s) Oral every 8 hours  heparin   Injectable 7500 Unit(s) SubCutaneous every 8 hours  insulin glargine Injectable (LANTUS) 18 Unit(s) SubCutaneous at bedtime  insulin lispro (HumaLOG) corrective regimen sliding scale   SubCutaneous Before meals and at bedtime  insulin lispro Injectable (HumaLOG) 8 Unit(s) SubCutaneous three times a day before meals  loperamide 2 milliGRAM(s) Oral daily  metFORMIN 500 milliGRAM(s) Oral every 12 hours  pantoprazole    Tablet 40 milliGRAM(s) Oral every 12 hours  rifAXIMin 550 milliGRAM(s) Oral two times a day  sodium bicarbonate 650 milliGRAM(s) Oral three times a day  sodium chloride 1 Gram(s) Oral three times a day  traZODone 100 milliGRAM(s) Oral at bedtime    MEDICATIONS  (PRN):  acetaminophen   Tablet .. 500 milliGRAM(s) Oral every 6 hours PRN Mild Pain (1 - 3), Moderate Pain (4 - 6)  dextrose 40% Gel 15 Gram(s) Oral once PRN Blood Glucose LESS THAN 70 milliGRAM(s)/deciliter  glucagon  Injectable 1 milliGRAM(s) IntraMuscular once PRN Glucose LESS THAN 70 milligrams/deciliter      PHYSICAL EXAM  Vital Signs Last 24 Hrs  T(C): 36.2 (27 May 2020 14:21), Max: 37 (27 May 2020 05:59)  T(F): 97.2 (27 May 2020 14:21), Max: 98.6 (27 May 2020 05:59)  HR: 68 (27 May 2020 14:21) (60 - 72)  BP: 122/56 (27 May 2020 14:21) (122/56 - 130/67)  BP(mean): --  RR: 18 (27 May 2020 14:21) (18 - 18)  SpO2: 98% (27 May 2020 14:21) (98% - 99%)    Due to the nature of this patient’s COVID-19 isolation status (either confirmed or suspected), this note was prepared without a bedside physical examination to prevent spread of infection and to conserve personal protective equipment during this nationwide pandemic. If possible, direct patient communication occurred via electronic measures or telephone conversation. Examination highlights were provided by a bedside nurse wearing personal protective equipment and review of pertinent medical records. Objective data (vital signs, urine output, lab results, imaging studies, medications, etc) were reviewed in detail. Face to face visits and physical examination have been limited only to patients for whom it is required for medical decision making.    LABS:                        8.9    10.26 )-----------( 196      ( 27 May 2020 10:23 )             26.9     05-27    131<L>  |  98  |  42<H>  ----------------------------<  119<H>  4.0   |  21<L>  |  1.36<H>    Ca    9.6      27 May 2020 10:23  Mg     1.5           Thyroid Stimulating Hormone, Serum: 2.613 uIU/mL ( @ 10:28)  Thyroid Stimulating Hormone, Serum: 2.389 uIU/mL ( @ 07:38)  Thyroid Stimulating Hormone, Serum: 3.869 uIU/mL ( @ 06:50)  Thyroid Stimulating Hormone, Serum: 3.104 uIU/mL ( @ 06:10)  Thyroid Stimulating Hormone, Serum: 2.712 uIU/mL ( @ 06:31)  Thyroid Stimulating Hormone, Serum: 3.063 uIU/mL ( @ 06:31)  Thyroid Stimulating Hormone, Serum: 3.509 uIU/mL ( @ 06:07)  Thyroid Stimulating Hormone, Serum: 3.325 uIU/mL ( @ 06:17)  Thyroid Stimulating Hormone, Serum: 2.813 uIU/mL ( @ 07:28)  Thyroid Stimulating Hormone, Serum: 2.877 uIU/mL ( @ 00:34)      HbA1C: 6.9 % ( @ 06:59)  7.6 % ( @ 05:58)    CAPILLARY BLOOD GLUCOSE      POCT Blood Glucose.: 192 mg/dL (27 May 2020 16:53)  POCT Blood Glucose.: 147 mg/dL (27 May 2020 11:55)  POCT Blood Glucose.: 132 mg/dL (27 May 2020 07:50)  POCT Blood Glucose.: 279 mg/dL (26 May 2020 22:07)    A/P 60yMale with hx of DM now with COVID, worsening renal function, moderate hyperglycemia.     1.  DM: type 2  Please continue Lantus 18 units at bedtime  Please continue Lispro 8  units tid with meals.   Continue lispro moderate dose scale with meals and at bedtime.   Please continue metformin 500 mg BID - with breakfast and dinner.   Continue consistent carb diet  FSG Goal 100-180  GFR 40 and EF 65%    2.  Hyperlipidemia - goal LDL < 70  - on lipitor 10 mg once daily    For discharge, TBD    Case seen and discussed with  and updated the primary team.  Pt can follow up at discharge with [facility] by calling  to make an appointment.

## 2020-05-27 NOTE — PROGRESS NOTE ADULT - PROBLEM SELECTOR PLAN 4
Resolved. Baseline Cr 1.5-2, currently stable around 1.3.  - monitor I/Os   - renal following - appreciate recs - f/u [facility], [facility]  - avoid nephrotoxic agents    #Non-anion gap metabolic acidosis   - sodium bicarbonate TID  - f/u renal recs

## 2020-05-27 NOTE — PROGRESS NOTE ADULT - ASSESSMENT
A/p  Patient with Hx IVDU, ETOH liver cirrhosis admitted for toxic megacolon, pneumatosis and SARS-CoV-2 infection, underwent ex lap and subtotal colectomy with ileostomy creation, nephrology initially consulted for non oliguric MEG attributed to ATN which improved significantly and plateaued with Cr ranging 1.5-2, he has been having Hyponatremia throughout his hosp stay possibly related to cirrhosis currently working on establishing optimal regimen for hyponatremia

## 2020-05-27 NOTE — PROGRESS NOTE ADULT - PROBLEM SELECTOR PLAN 5
Normocytic, likely 2/2 chronic disease + kidney dysfunction. Hemoglobin stable around 8-9. S/p 7u pRBCs total this admission. Initial concern for GI source, however patient refused further workup and GI states capsule endoscopy no longer indicated; ostomy output has remained non-bloody.  - goal hemoglobin >7  - pantoprazole 40 mg PO BID    #Hyponatremia  In setting of cirrhosis, hypervolemic hyponatremia. Stable in low 130s.  - salt tabs 1 g TID  - fluid restriction  - f/u Uosm, Tanvi  - holding diuretics  - daily BMP  - f/u renal recs

## 2020-05-27 NOTE — PROGRESS NOTE ADULT - PROBLEM SELECTOR PLAN 2
Cirrhosis c/b ascites likely 2/2 alcohol abuse. SBP ruled out, hepatic encephalopathy poa now resolved. Paracentesis on 4/10 with negative cytology. Abdominal U/S 5/3 small ascites   - continue Rifaximin 550 mg BID for hepatic encephalopathy  - holding Lasix + Spironolactone for ascites in s/o hyponatremia  - holding Lactulose in setting of high ostomy output  - maximum Tylenol 2 g qd

## 2020-05-27 NOTE — PROGRESS NOTE ADULT - ASSESSMENT
60M PMH HTN, DM (w/ neuropathy), IVDU, ETOH liver cirrhosis who presented to [facility] s/p fall at home; was found with toxic megacolon and admitted to SICU; now s/p ex lap + subtotal colectomy, ileostomy (3/31). Hospital course complicated by COVID, MEG, and anemia with suspicion for GI source/varices (refused further GI workup). Pt stable for discharge and pending establishment of proper wound-care services. COVID negative on 5/23.

## 2020-05-27 NOTE — PROGRESS NOTE ADULT - SUBJECTIVE AND OBJECTIVE BOX
O/N Events: no acute events    Subjective: No complaints this morning. Patient alert and talkative.    VITALS  Vital Signs Last 24 Hrs  T(C): 37 (27 May 2020 05:59), Max: 37 (27 May 2020 05:59)  T(F): 98.6 (27 May 2020 05:59), Max: 98.6 (27 May 2020 05:59)  HR: 60 (27 May 2020 05:59) (60 - 72)  BP: 130/67 (27 May 2020 05:59) (129/82 - 130/67)  BP(mean): --  RR: 18 (27 May 2020 05:59) (18 - 18)  SpO2: 98% (27 May 2020 05:59) (98% - 99%)    CAPILLARY BLOOD GLUCOSE      POCT Blood Glucose.: 132 mg/dL (27 May 2020 07:50)  POCT Blood Glucose.: 279 mg/dL (26 May 2020 22:07)  POCT Blood Glucose.: 149 mg/dL (26 May 2020 16:42)  POCT Blood Glucose.: 132 mg/dL (26 May 2020 11:38)      PHYSICAL EXAM  General appearance: awake and alert, sitting up in bed, pleasant  HEENT: sclera anicteric, MMM  Respiratory: breath sounds clear to auscultation throughout all lung fields, no accessory muscle use, no wheezing/rales/rhonchi   Cardiovascular: regular rate and rhythm  Gastrointestinal: soft, mildly tender to palpation in the LUQ, non-distended, normoactive bowel sounds, dressing over midline c/d/i, ostomy output small and light brown/green, no evidence of melena  Extremities: WWP, no lower extremity edema  Neurological: no obvious focal deficits    MEDICATIONS  (STANDING):  amLODIPine   Tablet 10 milliGRAM(s) Oral daily  AQUAPHOR (petrolatum Ointment) 1 Application(s) Topical two times a day  atorvastatin 10 milliGRAM(s) Oral at bedtime  baclofen 2.5 milliGRAM(s) Oral every 12 hours  brimonidine 0.2% Ophthalmic Solution 1 Drop(s) Both EYES three times a day  dextrose 5%. 1000 milliLiter(s) (50 mL/Hr) IV Continuous <Continuous>  dextrose 50% Injectable 12.5 Gram(s) IV Push once  dextrose 50% Injectable 25 Gram(s) IV Push once  dextrose 50% Injectable 25 Gram(s) IV Push once  dorzolamide 2% Ophthalmic Solution 1 Drop(s) Both EYES three times a day  gabapentin 300 milliGRAM(s) Oral every 8 hours  heparin   Injectable 7500 Unit(s) SubCutaneous every 8 hours  insulin glargine Injectable (LANTUS) 18 Unit(s) SubCutaneous at bedtime  insulin lispro (HumaLOG) corrective regimen sliding scale   SubCutaneous Before meals and at bedtime  insulin lispro Injectable (HumaLOG) 8 Unit(s) SubCutaneous three times a day before meals  loperamide 2 milliGRAM(s) Oral daily  magnesium sulfate  IVPB 4 Gram(s) IV Intermittent once  metFORMIN 500 milliGRAM(s) Oral every 12 hours  pantoprazole    Tablet 40 milliGRAM(s) Oral every 12 hours  rifAXIMin 550 milliGRAM(s) Oral two times a day  sodium bicarbonate 650 milliGRAM(s) Oral three times a day  sodium chloride 1 Gram(s) Oral three times a day  traZODone 100 milliGRAM(s) Oral at bedtime    MEDICATIONS  (PRN):  acetaminophen   Tablet .. 500 milliGRAM(s) Oral every 6 hours PRN Mild Pain (1 - 3), Moderate Pain (4 - 6)  dextrose 40% Gel 15 Gram(s) Oral once PRN Blood Glucose LESS THAN 70 milliGRAM(s)/deciliter  glucagon  Injectable 1 milliGRAM(s) IntraMuscular once PRN Glucose LESS THAN 70 milligrams/deciliter      No Known Allergies      LABS                        8.9    10.26 )-----------( 196      ( 27 May 2020 10:23 )             26.9     05-27    131<L>  |  98  |  42<H>  ----------------------------<  119<H>  4.0   |  21<L>  |  1.36<H>    Ca    9.6      27 May 2020 10:23  Mg     1.5     05-27                PROCEDURES/IMAGING: reviewed.

## 2020-05-27 NOTE — PROGRESS NOTE ADULT - PROBLEM SELECTOR PLAN 10
F: none  E: replete prn to K>4; Mg>2   N: low fiber, consistent carb  GI ppx: Protonix 40 mg PO BID  DVT ppx: Heparin 7500u SQ q8h    DISPO: pending placement

## 2020-05-27 NOTE — PROGRESS NOTE ADULT - ATTENDING COMMENTS
SIADH with stable Na on 1g TID Natabs, fluid restriction <1L  HTN well controlled  MEG and metabolic acidosis improving  check urine Na, Osm

## 2020-05-28 LAB
ANION GAP SERPL CALC-SCNC: 11 MMOL/L — SIGNIFICANT CHANGE UP (ref 5–17)
BUN SERPL-MCNC: 26 MG/DL — HIGH (ref 7–23)
CALCIUM SERPL-MCNC: 9.5 MG/DL — SIGNIFICANT CHANGE UP (ref 8.4–10.5)
CHLORIDE SERPL-SCNC: 100 MMOL/L — SIGNIFICANT CHANGE UP (ref 96–108)
CO2 SERPL-SCNC: 22 MMOL/L — SIGNIFICANT CHANGE UP (ref 22–31)
CREAT SERPL-MCNC: 1.19 MG/DL — SIGNIFICANT CHANGE UP (ref 0.5–1.3)
GLUCOSE BLDC GLUCOMTR-MCNC: 104 MG/DL — HIGH (ref 70–99)
GLUCOSE BLDC GLUCOMTR-MCNC: 178 MG/DL — HIGH (ref 70–99)
GLUCOSE BLDC GLUCOMTR-MCNC: 224 MG/DL — HIGH (ref 70–99)
GLUCOSE SERPL-MCNC: 90 MG/DL — SIGNIFICANT CHANGE UP (ref 70–99)
MAGNESIUM SERPL-MCNC: 1.9 MG/DL — SIGNIFICANT CHANGE UP (ref 1.6–2.6)
PHOSPHATE SERPL-MCNC: 3.4 MG/DL — SIGNIFICANT CHANGE UP (ref 2.5–4.5)
POTASSIUM SERPL-MCNC: 4.5 MMOL/L — SIGNIFICANT CHANGE UP (ref 3.5–5.3)
POTASSIUM SERPL-SCNC: 4.5 MMOL/L — SIGNIFICANT CHANGE UP (ref 3.5–5.3)
SODIUM SERPL-SCNC: 133 MMOL/L — LOW (ref 135–145)

## 2020-05-28 PROCEDURE — 99231 SBSQ HOSP IP/OBS SF/LOW 25: CPT | Mod: GC

## 2020-05-28 PROCEDURE — 99232 SBSQ HOSP IP/OBS MODERATE 35: CPT

## 2020-05-28 PROCEDURE — 99232 SBSQ HOSP IP/OBS MODERATE 35: CPT | Mod: GC

## 2020-05-28 RX ORDER — LOPERAMIDE HCL 2 MG
1 TABLET ORAL
Qty: 0 | Refills: 0 | DISCHARGE
Start: 2020-05-28

## 2020-05-28 RX ORDER — INSULIN GLARGINE 100 [IU]/ML
20 INJECTION, SOLUTION SUBCUTANEOUS AT BEDTIME
Refills: 0 | Status: DISCONTINUED | OUTPATIENT
Start: 2020-05-28 | End: 2020-06-01

## 2020-05-28 RX ADMIN — INSULIN GLARGINE 20 UNIT(S): 100 INJECTION, SOLUTION SUBCUTANEOUS at 21:39

## 2020-05-28 RX ADMIN — Medication 8 UNIT(S): at 11:56

## 2020-05-28 RX ADMIN — Medication 650 MILLIGRAM(S): at 13:51

## 2020-05-28 RX ADMIN — AMLODIPINE BESYLATE 10 MILLIGRAM(S): 2.5 TABLET ORAL at 05:52

## 2020-05-28 RX ADMIN — METFORMIN HYDROCHLORIDE 500 MILLIGRAM(S): 850 TABLET ORAL at 05:57

## 2020-05-28 RX ADMIN — Medication 4: at 17:33

## 2020-05-28 RX ADMIN — METFORMIN HYDROCHLORIDE 500 MILLIGRAM(S): 850 TABLET ORAL at 17:39

## 2020-05-28 RX ADMIN — SODIUM CHLORIDE 1 GRAM(S): 9 INJECTION INTRAMUSCULAR; INTRAVENOUS; SUBCUTANEOUS at 21:40

## 2020-05-28 RX ADMIN — HEPARIN SODIUM 7500 UNIT(S): 5000 INJECTION INTRAVENOUS; SUBCUTANEOUS at 21:27

## 2020-05-28 RX ADMIN — BRIMONIDINE TARTRATE 1 DROP(S): 2 SOLUTION/ DROPS OPHTHALMIC at 13:45

## 2020-05-28 RX ADMIN — Medication 4: at 21:40

## 2020-05-28 RX ADMIN — HEPARIN SODIUM 7500 UNIT(S): 5000 INJECTION INTRAVENOUS; SUBCUTANEOUS at 05:53

## 2020-05-28 RX ADMIN — SODIUM CHLORIDE 1 GRAM(S): 9 INJECTION INTRAMUSCULAR; INTRAVENOUS; SUBCUTANEOUS at 05:56

## 2020-05-28 RX ADMIN — Medication 8 UNIT(S): at 08:34

## 2020-05-28 RX ADMIN — Medication 500 MILLIGRAM(S): at 21:26

## 2020-05-28 RX ADMIN — BRIMONIDINE TARTRATE 1 DROP(S): 2 SOLUTION/ DROPS OPHTHALMIC at 21:37

## 2020-05-28 RX ADMIN — BRIMONIDINE TARTRATE 1 DROP(S): 2 SOLUTION/ DROPS OPHTHALMIC at 05:55

## 2020-05-28 RX ADMIN — GABAPENTIN 300 MILLIGRAM(S): 400 CAPSULE ORAL at 21:26

## 2020-05-28 RX ADMIN — Medication 650 MILLIGRAM(S): at 05:52

## 2020-05-28 RX ADMIN — Medication 2.5 MILLIGRAM(S): at 05:55

## 2020-05-28 RX ADMIN — DORZOLAMIDE HYDROCHLORIDE 1 DROP(S): 20 SOLUTION/ DROPS OPHTHALMIC at 13:45

## 2020-05-28 RX ADMIN — GABAPENTIN 300 MILLIGRAM(S): 400 CAPSULE ORAL at 05:56

## 2020-05-28 RX ADMIN — Medication 2.5 MILLIGRAM(S): at 17:39

## 2020-05-28 RX ADMIN — Medication 2: at 08:33

## 2020-05-28 RX ADMIN — Medication 100 MILLIGRAM(S): at 21:26

## 2020-05-28 RX ADMIN — PANTOPRAZOLE SODIUM 40 MILLIGRAM(S): 20 TABLET, DELAYED RELEASE ORAL at 17:39

## 2020-05-28 RX ADMIN — Medication 8 UNIT(S): at 17:33

## 2020-05-28 RX ADMIN — DORZOLAMIDE HYDROCHLORIDE 1 DROP(S): 20 SOLUTION/ DROPS OPHTHALMIC at 21:39

## 2020-05-28 RX ADMIN — Medication 1 APPLICATION(S): at 11:19

## 2020-05-28 RX ADMIN — DORZOLAMIDE HYDROCHLORIDE 1 DROP(S): 20 SOLUTION/ DROPS OPHTHALMIC at 05:55

## 2020-05-28 RX ADMIN — GABAPENTIN 300 MILLIGRAM(S): 400 CAPSULE ORAL at 13:51

## 2020-05-28 RX ADMIN — Medication 650 MILLIGRAM(S): at 21:26

## 2020-05-28 RX ADMIN — SODIUM CHLORIDE 1 GRAM(S): 9 INJECTION INTRAMUSCULAR; INTRAVENOUS; SUBCUTANEOUS at 14:10

## 2020-05-28 RX ADMIN — ATORVASTATIN CALCIUM 10 MILLIGRAM(S): 80 TABLET, FILM COATED ORAL at 21:26

## 2020-05-28 RX ADMIN — PANTOPRAZOLE SODIUM 40 MILLIGRAM(S): 20 TABLET, DELAYED RELEASE ORAL at 05:52

## 2020-05-28 RX ADMIN — HEPARIN SODIUM 7500 UNIT(S): 5000 INJECTION INTRAVENOUS; SUBCUTANEOUS at 13:51

## 2020-05-28 RX ADMIN — Medication 2 MILLIGRAM(S): at 11:58

## 2020-05-28 NOTE — PROGRESS NOTE ADULT - PROBLEM SELECTOR PLAN 4
RESOLVED. Baseline Cr 1.5-2, currently stable around 1.3.  - monitor I/Os   - renal following - appreciate recs  - avoid nephrotoxic agents    #Non-anion gap metabolic acidosis   - sodium bicarbonate TID  - f/u renal recs

## 2020-05-28 NOTE — ADVANCED PRACTICE NURSE CONSULT - ASSESSMENT
New dressing applied to surgical wound at midline. Superior wound almost completely epithelialized except for opening in center of wound, which gets smaller each day. Inferior wound with dry scab over wound bed, no drainage noted. Pt states he changed his pouch this morning because it was getting dirty but did not change the skin barrier, which remains intact due to inability to visualize. Semi-thick light brown stool in pouch.

## 2020-05-28 NOTE — PROGRESS NOTE ADULT - SUBJECTIVE AND OBJECTIVE BOX
O/N Events: no acute events    Subjective: Patient seen at the bedside. Feels well, reporting continued, intermittent (more so after meals) left-sided abdominal pain.    VITALS  Vital Signs Last 24 Hrs  T(C): 36.5 (28 May 2020 06:30), Max: 36.6 (27 May 2020 22:27)  T(F): 97.7 (28 May 2020 06:30), Max: 97.8 (27 May 2020 22:27)  HR: 60 (28 May 2020 06:30) (60 - 68)  BP: 158/77 (28 May 2020 06:30) (122/56 - 158/77)  BP(mean): --  RR: 18 (28 May 2020 06:30) (18 - 18)  SpO2: 100% (28 May 2020 06:30) (98% - 100%)    CAPILLARY BLOOD GLUCOSE      POCT Blood Glucose.: 104 mg/dL (28 May 2020 11:43)  POCT Blood Glucose.: 178 mg/dL (28 May 2020 08:17)  POCT Blood Glucose.: 153 mg/dL (27 May 2020 21:54)  POCT Blood Glucose.: 192 mg/dL (27 May 2020 16:53)      PHYSICAL EXAM  General appearance: sitting up with hoodie over head, no acute distress  HEENT: MMM  Respiratory: breath sounds clear to auscultation throughout all lung fields, no wheezing or crackles  Cardiovascular: regular rate and rhythm  Gastrointestinal: soft, non-tender, moderately distended, normoactive bowel sounds, dressing over the midline c/d/i, midline incision light pink with small opening in the middle, no bleeding, ostomy with light green moderate output  Extremities: P  Neurological: AOx3, no obvious focal deficits    MEDICATIONS  (STANDING):  amLODIPine   Tablet 10 milliGRAM(s) Oral daily  AQUAPHOR (petrolatum Ointment) 1 Application(s) Topical two times a day  atorvastatin 10 milliGRAM(s) Oral at bedtime  baclofen 2.5 milliGRAM(s) Oral every 12 hours  brimonidine 0.2% Ophthalmic Solution 1 Drop(s) Both EYES three times a day  dextrose 5%. 1000 milliLiter(s) (50 mL/Hr) IV Continuous <Continuous>  dextrose 50% Injectable 12.5 Gram(s) IV Push once  dextrose 50% Injectable 25 Gram(s) IV Push once  dextrose 50% Injectable 25 Gram(s) IV Push once  dorzolamide 2% Ophthalmic Solution 1 Drop(s) Both EYES three times a day  gabapentin 300 milliGRAM(s) Oral every 8 hours  heparin   Injectable 7500 Unit(s) SubCutaneous every 8 hours  insulin glargine Injectable (LANTUS) 18 Unit(s) SubCutaneous at bedtime  insulin lispro (HumaLOG) corrective regimen sliding scale   SubCutaneous Before meals and at bedtime  insulin lispro Injectable (HumaLOG) 8 Unit(s) SubCutaneous three times a day before meals  loperamide 2 milliGRAM(s) Oral daily  metFORMIN 500 milliGRAM(s) Oral every 12 hours  pantoprazole    Tablet 40 milliGRAM(s) Oral every 12 hours  rifAXIMin 550 milliGRAM(s) Oral two times a day  sodium bicarbonate 650 milliGRAM(s) Oral three times a day  sodium chloride 1 Gram(s) Oral three times a day  traZODone 100 milliGRAM(s) Oral at bedtime    MEDICATIONS  (PRN):  acetaminophen   Tablet .. 500 milliGRAM(s) Oral every 6 hours PRN Mild Pain (1 - 3), Moderate Pain (4 - 6)  dextrose 40% Gel 15 Gram(s) Oral once PRN Blood Glucose LESS THAN 70 milliGRAM(s)/deciliter  glucagon  Injectable 1 milliGRAM(s) IntraMuscular once PRN Glucose LESS THAN 70 milligrams/deciliter      No Known Allergies      LABS                        8.9    10.26 )-----------( 196      ( 27 May 2020 10:23 )             26.9     05-28    133<L>  |  100  |  26<H>  ----------------------------<  90  4.5   |  22  |  1.19    Ca    9.5      28 May 2020 11:09  Phos  3.4     05-28  Mg     1.9     05-28                PROCEDURES/IMAGING: reviewed.

## 2020-05-28 NOTE — PROGRESS NOTE ADULT - SUBJECTIVE AND OBJECTIVE BOX
Patient is a 60y old  Male who presents with a chief complaint of COVID (25 May 2020 08:33)      INTERVAL HPI/OVERNIGHT EVENTS:    Pt. seen and examined earlier today by housestaff  No complaints, per report    Review of Systems: 12 point review of systems otherwise negative    MEDICATIONS  (STANDING):  amLODIPine   Tablet 10 milliGRAM(s) Oral daily  AQUAPHOR (petrolatum Ointment) 1 Application(s) Topical two times a day  atorvastatin 10 milliGRAM(s) Oral at bedtime  baclofen 2.5 milliGRAM(s) Oral every 12 hours  brimonidine 0.2% Ophthalmic Solution 1 Drop(s) Both EYES three times a day  dextrose 5%. 1000 milliLiter(s) (50 mL/Hr) IV Continuous <Continuous>  dextrose 50% Injectable 12.5 Gram(s) IV Push once  dextrose 50% Injectable 25 Gram(s) IV Push once  dextrose 50% Injectable 25 Gram(s) IV Push once  dorzolamide 2% Ophthalmic Solution 1 Drop(s) Both EYES three times a day  gabapentin 300 milliGRAM(s) Oral every 8 hours  heparin   Injectable 7500 Unit(s) SubCutaneous every 8 hours  insulin glargine Injectable (LANTUS) 18 Unit(s) SubCutaneous at bedtime  insulin lispro (HumaLOG) corrective regimen sliding scale   SubCutaneous Before meals and at bedtime  insulin lispro Injectable (HumaLOG) 8 Unit(s) SubCutaneous three times a day before meals  loperamide 2 milliGRAM(s) Oral daily  metFORMIN 500 milliGRAM(s) Oral every 12 hours  pantoprazole    Tablet 40 milliGRAM(s) Oral every 12 hours  rifAXIMin 550 milliGRAM(s) Oral two times a day  sodium bicarbonate 650 milliGRAM(s) Oral three times a day  sodium chloride 1 Gram(s) Oral three times a day  traZODone 100 milliGRAM(s) Oral at bedtime    MEDICATIONS  (PRN):  acetaminophen   Tablet .. 500 milliGRAM(s) Oral every 6 hours PRN Mild Pain (1 - 3), Moderate Pain (4 - 6)  dextrose 40% Gel 15 Gram(s) Oral once PRN Blood Glucose LESS THAN 70 milliGRAM(s)/deciliter  glucagon  Injectable 1 milliGRAM(s) IntraMuscular once PRN Glucose LESS THAN 70 milligrams/deciliter      Allergies    No Known Allergies    Intolerances          Vital Signs Last 24 Hrs  T(C): 36.5 (28 May 2020 06:30), Max: 36.6 (27 May 2020 22:27)  T(F): 97.7 (28 May 2020 06:30), Max: 97.8 (27 May 2020 22:27)  HR: 60 (28 May 2020 06:30) (60 - 68)  BP: 158/77 (28 May 2020 06:30) (122/56 - 158/77)  BP(mean): --  RR: 18 (28 May 2020 06:30) (18 - 18)  SpO2: 100% (28 May 2020 06:30) (98% - 100%)  CAPILLARY BLOOD GLUCOSE      POCT Blood Glucose.: 104 mg/dL (28 May 2020 11:43)  POCT Blood Glucose.: 178 mg/dL (28 May 2020 08:17)  POCT Blood Glucose.: 153 mg/dL (27 May 2020 21:54)  POCT Blood Glucose.: 192 mg/dL (27 May 2020 16:53)        [facility]:  [facility]  LABS:                        8.9    10.26 )-----------( 196      ( 27 May 2020 10:23 )             26.9     05-28    133<L>  |  100  |  26<H>  ----------------------------<  90  4.5   |  22  |  1.19    Ca    9.5      28 May 2020 11:09  Phos  3.4     05-28  Mg     1.9     05-28              RADIOLOGY & ADDITIONAL TESTS:    ---------------------------------------------------------------------------  I personally reviewed: [  ]EKG   [  ]CXR    [  ] CT    [  ]Other  ---------------------------------------------------------------------------  PLEASE CHECK WHEN PRESENT:     [  ]Heart Failure     [  ] Acute     [  ] Acute on Chronic     [  ] Chronic  -------------------------------------------------------------------     [  ]Diastolic [HFpEF]     [  ]Systolic [HFrEF]     [  ]Combined [HFpEF & HFrEF]     [  ]Other:  -------------------------------------------------------------------  [  ]MEG     [  ]ATN     [  ]Reneal Medullary Necrosis     [  ]Renal Cortical Necrosis     [  ]Other Pathological Lesions:    [  ]CKD 1  [  ]CKD 2  [  ]CKD 3  [  ]CKD 4  [  ]CKD 5  [  ]Other  -------------------------------------------------------------------  [  ]Other/Unspecified:    --------------------------------------------------------------------    Abdominal Nutritional Status  [  ]Malnutrition: See Nutrition Note  [  ]Cachexia  [  ]Other:   [  ]Supplement Ordered:  [  ]Morbid Obesity (BMI >=40]

## 2020-05-28 NOTE — PROGRESS NOTE ADULT - ATTENDING COMMENTS
Glucoses are moderately elevated once again, though mostly his AM fingerstick.  Will increase the Lantus to 20 units, leave the premeal at 8 units

## 2020-05-28 NOTE — PROGRESS NOTE ADULT - ASSESSMENT
A/p  Patient with Hx IVDU, ETOH liver cirrhosis admitted for toxic megacolon, pneumatosis and SARS-CoV-2 infection, underwent ex lap and subtotal colectomy with ileostomy creation, nephrology initially consulted for non oliguric MEG attributed to ATN which continues to improve and almost normalized, he has been having Hyponatremia throughout his hosp stay related to cirrhosis vs pre renal etiology currently working on establishing optimal regimen for hyponatremia

## 2020-05-28 NOTE — PROGRESS NOTE ADULT - PROBLEM SELECTOR PLAN 3
S/p ex lap with subtotal colectomy+ileostomy on 3/31. Course c/b fever and purulent wound infection (4/9) s/p vanc/zosyn, now resolved.  - daily dressings for midline incision  - ileostomy in place draining green stool, no melena noted  - continue Loperamide 2 mg daily (will be able to get 7 days of meds on d/c)  - general surgery following, appreciate recs  - wound care

## 2020-05-28 NOTE — PROGRESS NOTE ADULT - PROBLEM SELECTOR PLAN 2
likely multifactorial; renal fxn has improved to baseline; cont. renal diet, sodium bicarb; monitor BMP off diuretics, renally-dose rx, f/u Renal recs

## 2020-05-28 NOTE — PROGRESS NOTE ADULT - ASSESSMENT
60M PMH HTN, DM (w/ neuropathy), IVDU, ETOH liver cirrhosis who presented to [facility] s/p fall at home; was found with toxic megacolon and admitted to SICU; now s/p ex lap + subtotal colectomy, ileostomy (3/31). Hospital course complicated by COVID, MEG, and anemia with suspicion for GI source/varices (refused further GI workup). Pt stable for discharge and pending establishment of proper wound-care services. COVID negative on 5/23.

## 2020-05-28 NOTE — PROGRESS NOTE ADULT - PROBLEM SELECTOR PLAN 1
Hypovolemic hyponatremia with low urinary sodium and good response to NS  sNa improved to 133  - can consider another 500 cc NS, encourage PO hydration and intake  - please repeat Deloris and Osm   will follow

## 2020-05-28 NOTE — PROGRESS NOTE ADULT - SUBJECTIVE AND OBJECTIVE BOX
INTERVAL HPI/OVERNIGHT EVENTS:    Patient is a 60y old  Male who presents with a chief complaint of COVID (25 May 2020 08:33)      Pt reports the following symptoms:    CONSTITUTIONAL:  Negative fever or chills, feels well, good appetite  EYES:  Negative  blurry vision or double vision  CARDIOVASCULAR:  Negative for chest pain or palpitations  RESPIRATORY:  Negative for cough, wheezing, or SOB   GASTROINTESTINAL:  Negative for nausea, vomiting, diarrhea, constipation, or abdominal pain  GENITOURINARY:  Negative frequency, urgency or dysuria  NEUROLOGIC:  No headache, confusion, dizziness, lightheadedness    MEDICATIONS  (STANDING):  amLODIPine   Tablet 10 milliGRAM(s) Oral daily  AQUAPHOR (petrolatum Ointment) 1 Application(s) Topical two times a day  atorvastatin 10 milliGRAM(s) Oral at bedtime  baclofen 2.5 milliGRAM(s) Oral every 12 hours  brimonidine 0.2% Ophthalmic Solution 1 Drop(s) Both EYES three times a day  dextrose 5%. 1000 milliLiter(s) (50 mL/Hr) IV Continuous <Continuous>  dextrose 50% Injectable 12.5 Gram(s) IV Push once  dextrose 50% Injectable 25 Gram(s) IV Push once  dextrose 50% Injectable 25 Gram(s) IV Push once  dorzolamide 2% Ophthalmic Solution 1 Drop(s) Both EYES three times a day  gabapentin 300 milliGRAM(s) Oral every 8 hours  heparin   Injectable 7500 Unit(s) SubCutaneous every 8 hours  insulin glargine Injectable (LANTUS) 18 Unit(s) SubCutaneous at bedtime  insulin lispro (HumaLOG) corrective regimen sliding scale   SubCutaneous Before meals and at bedtime  insulin lispro Injectable (HumaLOG) 8 Unit(s) SubCutaneous three times a day before meals  loperamide 2 milliGRAM(s) Oral daily  metFORMIN 500 milliGRAM(s) Oral every 12 hours  pantoprazole    Tablet 40 milliGRAM(s) Oral every 12 hours  rifAXIMin 550 milliGRAM(s) Oral two times a day  sodium bicarbonate 650 milliGRAM(s) Oral three times a day  sodium chloride 1 Gram(s) Oral three times a day  traZODone 100 milliGRAM(s) Oral at bedtime    MEDICATIONS  (PRN):  acetaminophen   Tablet .. 500 milliGRAM(s) Oral every 6 hours PRN Mild Pain (1 - 3), Moderate Pain (4 - 6)  dextrose 40% Gel 15 Gram(s) Oral once PRN Blood Glucose LESS THAN 70 milliGRAM(s)/deciliter  glucagon  Injectable 1 milliGRAM(s) IntraMuscular once PRN Glucose LESS THAN 70 milligrams/deciliter      PHYSICAL EXAM  Vital Signs Last 24 Hrs  T(C): 36.5 (28 May 2020 06:30), Max: 36.6 (27 May 2020 22:27)  T(F): 97.7 (28 May 2020 06:30), Max: 97.8 (27 May 2020 22:27)  HR: 60 (28 May 2020 06:30) (60 - 68)  BP: 158/77 (28 May 2020 06:30) (122/56 - 158/77)  BP(mean): --  RR: 18 (28 May 2020 06:30) (18 - 18)  SpO2: 100% (28 May 2020 06:30) (98% - 100%)    Constitutional: wn/wd in NAD.   HEENT: NCAT, MMM, OP clear, EOMI, no proptosis or lid retraction  Neck: no thyromegaly or palpable thyroid nodules   Respiratory: lungs CTAB.  Cardiovascular: regular rhythm, normal S1 and S2, no audible murmurs, no peripheral edema  GI: soft, NT/ND, no masses/HSM appreciated.  Neurology: no tremors, DTR 2+  Skin: no visible rashes/lesions  Psychiatric: AAO x 3, normal affect/mood.    LABS:                        8.9    10.26 )-----------( 196      ( 27 May 2020 10:23 )             26.9     05-28    133<L>  |  100  |  26<H>  ----------------------------<  90  4.5   |  22  |  1.19    Ca    9.5      28 May 2020 11:09  Phos  3.4     05-28  Mg     1.9     05-28          Thyroid Stimulating Hormone, Serum: 2.613 uIU/mL (05-26 @ 10:28)  Thyroid Stimulating Hormone, Serum: 2.389 uIU/mL (05-18 @ 07:38)  Thyroid Stimulating Hormone, Serum: 3.869 uIU/mL (05-13 @ 06:50)  Thyroid Stimulating Hormone, Serum: 3.104 uIU/mL (05-11 @ 06:10)  Thyroid Stimulating Hormone, Serum: 2.712 uIU/mL (05-07 @ 06:31)  Thyroid Stimulating Hormone, Serum: 3.063 uIU/mL (05-07 @ 06:31)  Thyroid Stimulating Hormone, Serum: 3.509 uIU/mL (05-03 @ 06:07)  Thyroid Stimulating Hormone, Serum: 3.325 uIU/mL (04-29 @ 06:17)  Thyroid Stimulating Hormone, Serum: 2.813 uIU/mL (04-14 @ 07:28)  Thyroid Stimulating Hormone, Serum: 2.877 uIU/mL (04-14 @ 00:34)      HbA1C: 6.9 % (04-05 @ 06:59)  7.6 % (03-31 @ 05:58)    CAPILLARY BLOOD GLUCOSE      POCT Blood Glucose.: 104 mg/dL (28 May 2020 11:43)  POCT Blood Glucose.: 178 mg/dL (28 May 2020 08:17)  POCT Blood Glucose.: 153 mg/dL (27 May 2020 21:54)  POCT Blood Glucose.: 192 mg/dL (27 May 2020 16:53)      Insulin Sliding Scale requirements X 24 Hours:    RADIOLOGY & ADDITIONAL TESTS:    A/P: 60y Male with history of DM type II presenting for       1.  DM -     Please continue           units lantus at bedtime  / in the morning and        units lispro with meals and lispro moderate / low dose sliding scale 4 times daily with meals and at bedtime.  Please continue consistent carbohydrate diet.      Goal FSG is   Will continue to monitor   For discharge, pt can continue    Pt can follow up at discharge with [facility] by calling  to make an appointment.   Will discuss case with     and update primary team INTERVAL HPI/OVERNIGHT EVENTS:    Patient is a 60y old  Male who presents with a chief complaint of COVID (25 May 2020 08:33)  Spoke to patient via phone. he said that everything is good and does not want to talk any further  No acute events o/n.   Saturating well on RA. appetite is good, continues to complain of food trays not being enough.   Planned for d/c to shelter on friday.     FSG & Insulin received:  Yesterday:  pre-dinner fs, 8 + 2  units lispro   bedtime fs, 18 lantus   units + 2 units lispro SS  Today:  pre-breakfast fs, 8 + 2 units lispro   pre-lunch fs     Pt reports the following symptoms:  CONSTITUTIONAL:  Negative fever or chills  CARDIOVASCULAR:  Negative for chest pain or palpitations  RESPIRATORY:  Negative for cough, wheezing, or SOB   GASTROINTESTINAL:  Negative for vomiting, diarrhea, constipation, or abdominal pain  NEUROLOGIC:  No headache, confusion, dizziness, lightheadedness      MEDICATIONS  (STANDING):  amLODIPine   Tablet 10 milliGRAM(s) Oral daily  AQUAPHOR (petrolatum Ointment) 1 Application(s) Topical two times a day  atorvastatin 10 milliGRAM(s) Oral at bedtime  baclofen 2.5 milliGRAM(s) Oral every 12 hours  brimonidine 0.2% Ophthalmic Solution 1 Drop(s) Both EYES three times a day  dextrose 5%. 1000 milliLiter(s) (50 mL/Hr) IV Continuous <Continuous>  dextrose 50% Injectable 12.5 Gram(s) IV Push once  dextrose 50% Injectable 25 Gram(s) IV Push once  dextrose 50% Injectable 25 Gram(s) IV Push once  dorzolamide 2% Ophthalmic Solution 1 Drop(s) Both EYES three times a day  gabapentin 300 milliGRAM(s) Oral every 8 hours  heparin   Injectable 7500 Unit(s) SubCutaneous every 8 hours  insulin glargine Injectable (LANTUS) 18 Unit(s) SubCutaneous at bedtime  insulin lispro (HumaLOG) corrective regimen sliding scale   SubCutaneous Before meals and at bedtime  insulin lispro Injectable (HumaLOG) 8 Unit(s) SubCutaneous three times a day before meals  loperamide 2 milliGRAM(s) Oral daily  metFORMIN 500 milliGRAM(s) Oral every 12 hours  pantoprazole    Tablet 40 milliGRAM(s) Oral every 12 hours  rifAXIMin 550 milliGRAM(s) Oral two times a day  sodium bicarbonate 650 milliGRAM(s) Oral three times a day  sodium chloride 1 Gram(s) Oral three times a day  traZODone 100 milliGRAM(s) Oral at bedtime    MEDICATIONS  (PRN):  acetaminophen   Tablet .. 500 milliGRAM(s) Oral every 6 hours PRN Mild Pain (1 - 3), Moderate Pain (4 - 6)  dextrose 40% Gel 15 Gram(s) Oral once PRN Blood Glucose LESS THAN 70 milliGRAM(s)/deciliter  glucagon  Injectable 1 milliGRAM(s) IntraMuscular once PRN Glucose LESS THAN 70 milligrams/deciliter      PHYSICAL EXAM  Vital Signs Last 24 Hrs  T(C): 36.5 (28 May 2020 06:30), Max: 36.6 (27 May 2020 22:27)  T(F): 97.7 (28 May 2020 06:30), Max: 97.8 (27 May 2020 22:27)  HR: 60 (28 May 2020 06:30) (60 - 68)  BP: 158/77 (28 May 2020 06:30) (122/56 - 158/77)  BP(mean): --  RR: 18 (28 May 2020 06:30) (18 - 18)  SpO2: 100% (28 May 2020 06:30) (98% - 100%)      Due to the nature of this patient’s COVID-19 isolation status (either confirmed or suspected), this note was prepared without a bedside physical examination to prevent spread of infection and to conserve personal protective equipment during this nationwide pandemic. If possible, direct patient communication occurred via electronic measures or telephone conversation. Examination highlights were provided by a bedside nurse wearing personal protective equipment and review of pertinent medical records. Objective data (vital signs, urine output, lab results, imaging studies, medications, etc) were reviewed in detail. Face to face visits and physical examination have been limited only to patients for whom it is required for medical decision making.    LABS:                        8.9    10.26 )-----------( 196      ( 27 May 2020 10:23 )             26.9     05-28    133<L>  |  100  |  26<H>  ----------------------------<  90  4.5   |  22  |  1.19    Ca    9.5      28 May 2020 11:09  Phos  3.4       Mg     1.9               Thyroid Stimulating Hormone, Serum: 2.613 uIU/mL ( @ 10:28)  Thyroid Stimulating Hormone, Serum: 2.389 uIU/mL ( @ 07:38)  Thyroid Stimulating Hormone, Serum: 3.869 uIU/mL ( @ 06:50)  Thyroid Stimulating Hormone, Serum: 3.104 uIU/mL ( @ 06:10)  Thyroid Stimulating Hormone, Serum: 2.712 uIU/mL ( @ 06:31)  Thyroid Stimulating Hormone, Serum: 3.063 uIU/mL ( @ 06:31)  Thyroid Stimulating Hormone, Serum: 3.509 uIU/mL ( @ 06:07)  Thyroid Stimulating Hormone, Serum: 3.325 uIU/mL ( @ 06:17)  Thyroid Stimulating Hormone, Serum: 2.813 uIU/mL ( @ 07:28)  Thyroid Stimulating Hormone, Serum: 2.877 uIU/mL ( @ 00:34)      HbA1C: 6.9 % ( @ 06:59)  7.6 % ( @ 05:58)    CAPILLARY BLOOD GLUCOSE      POCT Blood Glucose.: 104 mg/dL (28 May 2020 11:43)  POCT Blood Glucose.: 178 mg/dL (28 May 2020 08:17)  POCT Blood Glucose.: 153 mg/dL (27 May 2020 21:54)  POCT Blood Glucose.: 192 mg/dL (27 May 2020 16:53)      Insulin Sliding Scale requirements X 24 Hours:    RADIOLOGY & ADDITIONAL TESTS:    A/P 60yMale with hx of DM now with COVID, worsening renal function, moderate hyperglycemia.     1.  DM: type 2  PLEASE STOP METFORMIN  Please increase to Lantus 20 units at bedtime  Please continue Lispro 8  units tid with meals.   Continue lispro moderate dose scale with meals and at bedtime.   Continue consistent carb diet  FSG Goal 100-180  GFR 40 and EF 65%    2.  Hyperlipidemia - goal LDL < 70  - on lipitor 10 mg once daily    For discharge, TBD    Case seen and discussed with  and updated the primary team.  Pt can follow up at discharge with [facility] by calling  to make an appointment.

## 2020-05-28 NOTE — PROGRESS NOTE ADULT - SUBJECTIVE AND OBJECTIVE BOX
O/N Events:  LUCA, remained HDS and afeb  Subjective:  sNa improving with NS up to 133, renal fx normalizing Cr down to 1.1    VITALS  Vital Signs Last 24 Hrs  T(C): 36.5 (28 May 2020 06:30), Max: 36.6 (27 May 2020 22:27)  T(F): 97.7 (28 May 2020 06:30), Max: 97.8 (27 May 2020 22:27)  HR: 60 (28 May 2020 06:30) (60 - 68)  BP: 158/77 (28 May 2020 06:30) (122/56 - 158/77)  RR: 18 (28 May 2020 06:30) (18 - 18)  SpO2: 100% (28 May 2020 06:30) (98% - 100%)    PHYSICAL EXAM  Deferred per isolation protocol     MEDICATIONS  (STANDING):  amLODIPine   Tablet 10 milliGRAM(s) Oral daily  AQUAPHOR (petrolatum Ointment) 1 Application(s) Topical two times a day  atorvastatin 10 milliGRAM(s) Oral at bedtime  baclofen 2.5 milliGRAM(s) Oral every 12 hours  brimonidine 0.2% Ophthalmic Solution 1 Drop(s) Both EYES three times a day  dextrose 5%. 1000 milliLiter(s) (50 mL/Hr) IV Continuous <Continuous>  dextrose 50% Injectable 12.5 Gram(s) IV Push once  dextrose 50% Injectable 25 Gram(s) IV Push once  dextrose 50% Injectable 25 Gram(s) IV Push once  dorzolamide 2% Ophthalmic Solution 1 Drop(s) Both EYES three times a day  gabapentin 300 milliGRAM(s) Oral every 8 hours  heparin   Injectable 7500 Unit(s) SubCutaneous every 8 hours  insulin glargine Injectable (LANTUS) 18 Unit(s) SubCutaneous at bedtime  insulin lispro (HumaLOG) corrective regimen sliding scale   SubCutaneous Before meals and at bedtime  insulin lispro Injectable (HumaLOG) 8 Unit(s) SubCutaneous three times a day before meals  loperamide 2 milliGRAM(s) Oral daily  metFORMIN 500 milliGRAM(s) Oral every 12 hours  pantoprazole    Tablet 40 milliGRAM(s) Oral every 12 hours  rifAXIMin 550 milliGRAM(s) Oral two times a day  sodium bicarbonate 650 milliGRAM(s) Oral three times a day  sodium chloride 1 Gram(s) Oral three times a day  traZODone 100 milliGRAM(s) Oral at bedtime    MEDICATIONS  (PRN):  acetaminophen   Tablet .. 500 milliGRAM(s) Oral every 6 hours PRN Mild Pain (1 - 3), Moderate Pain (4 - 6)  dextrose 40% Gel 15 Gram(s) Oral once PRN Blood Glucose LESS THAN 70 milliGRAM(s)/deciliter  glucagon  Injectable 1 milliGRAM(s) IntraMuscular once PRN Glucose LESS THAN 70 milligrams/deciliter      LABS                        8.9    10.26 )-----------( 196      ( 27 May 2020 10:23 )             26.9     05-28    133<L>  |  100  |  26<H>  ----------------------------<  90  4.5   |  22  |  1.19    Ca    9.5      28 May 2020 11:09  Phos  3.4     05-28  Mg     1.9     05-28

## 2020-05-29 LAB
GLUCOSE BLDC GLUCOMTR-MCNC: 121 MG/DL — HIGH (ref 70–99)
GLUCOSE BLDC GLUCOMTR-MCNC: 137 MG/DL — HIGH (ref 70–99)
GLUCOSE BLDC GLUCOMTR-MCNC: 159 MG/DL — HIGH (ref 70–99)
GLUCOSE BLDC GLUCOMTR-MCNC: 181 MG/DL — HIGH (ref 70–99)

## 2020-05-29 PROCEDURE — 99231 SBSQ HOSP IP/OBS SF/LOW 25: CPT | Mod: GC

## 2020-05-29 PROCEDURE — 99232 SBSQ HOSP IP/OBS MODERATE 35: CPT

## 2020-05-29 PROCEDURE — 99232 SBSQ HOSP IP/OBS MODERATE 35: CPT | Mod: GC

## 2020-05-29 RX ORDER — LOPERAMIDE HCL 2 MG
2 TABLET ORAL DAILY
Refills: 0 | Status: DISCONTINUED | OUTPATIENT
Start: 2020-05-29 | End: 2020-06-05

## 2020-05-29 RX ADMIN — HEPARIN SODIUM 7500 UNIT(S): 5000 INJECTION INTRAVENOUS; SUBCUTANEOUS at 21:43

## 2020-05-29 RX ADMIN — Medication 8 UNIT(S): at 07:58

## 2020-05-29 RX ADMIN — INSULIN GLARGINE 20 UNIT(S): 100 INJECTION, SOLUTION SUBCUTANEOUS at 21:46

## 2020-05-29 RX ADMIN — AMLODIPINE BESYLATE 10 MILLIGRAM(S): 2.5 TABLET ORAL at 05:36

## 2020-05-29 RX ADMIN — SODIUM CHLORIDE 1 GRAM(S): 9 INJECTION INTRAMUSCULAR; INTRAVENOUS; SUBCUTANEOUS at 21:45

## 2020-05-29 RX ADMIN — Medication 2: at 07:58

## 2020-05-29 RX ADMIN — ATORVASTATIN CALCIUM 10 MILLIGRAM(S): 80 TABLET, FILM COATED ORAL at 21:45

## 2020-05-29 RX ADMIN — SODIUM CHLORIDE 1 GRAM(S): 9 INJECTION INTRAMUSCULAR; INTRAVENOUS; SUBCUTANEOUS at 14:23

## 2020-05-29 RX ADMIN — Medication 650 MILLIGRAM(S): at 05:37

## 2020-05-29 RX ADMIN — Medication 2.5 MILLIGRAM(S): at 05:36

## 2020-05-29 RX ADMIN — Medication 650 MILLIGRAM(S): at 21:45

## 2020-05-29 RX ADMIN — SODIUM CHLORIDE 1 GRAM(S): 9 INJECTION INTRAMUSCULAR; INTRAVENOUS; SUBCUTANEOUS at 05:37

## 2020-05-29 RX ADMIN — PANTOPRAZOLE SODIUM 40 MILLIGRAM(S): 20 TABLET, DELAYED RELEASE ORAL at 17:29

## 2020-05-29 RX ADMIN — BRIMONIDINE TARTRATE 1 DROP(S): 2 SOLUTION/ DROPS OPHTHALMIC at 21:55

## 2020-05-29 RX ADMIN — HEPARIN SODIUM 7500 UNIT(S): 5000 INJECTION INTRAVENOUS; SUBCUTANEOUS at 14:23

## 2020-05-29 RX ADMIN — GABAPENTIN 300 MILLIGRAM(S): 400 CAPSULE ORAL at 14:23

## 2020-05-29 RX ADMIN — Medication 650 MILLIGRAM(S): at 14:24

## 2020-05-29 RX ADMIN — Medication 500 MILLIGRAM(S): at 21:45

## 2020-05-29 RX ADMIN — Medication 1 APPLICATION(S): at 18:03

## 2020-05-29 RX ADMIN — HEPARIN SODIUM 7500 UNIT(S): 5000 INJECTION INTRAVENOUS; SUBCUTANEOUS at 05:36

## 2020-05-29 RX ADMIN — Medication 8 UNIT(S): at 12:34

## 2020-05-29 RX ADMIN — BRIMONIDINE TARTRATE 1 DROP(S): 2 SOLUTION/ DROPS OPHTHALMIC at 05:47

## 2020-05-29 RX ADMIN — DORZOLAMIDE HYDROCHLORIDE 1 DROP(S): 20 SOLUTION/ DROPS OPHTHALMIC at 14:20

## 2020-05-29 RX ADMIN — Medication 100 MILLIGRAM(S): at 21:45

## 2020-05-29 RX ADMIN — GABAPENTIN 300 MILLIGRAM(S): 400 CAPSULE ORAL at 21:45

## 2020-05-29 RX ADMIN — Medication 2.5 MILLIGRAM(S): at 18:04

## 2020-05-29 RX ADMIN — Medication 2 MILLIGRAM(S): at 12:23

## 2020-05-29 RX ADMIN — Medication 1 APPLICATION(S): at 05:47

## 2020-05-29 RX ADMIN — Medication 8 UNIT(S): at 17:29

## 2020-05-29 RX ADMIN — DORZOLAMIDE HYDROCHLORIDE 1 DROP(S): 20 SOLUTION/ DROPS OPHTHALMIC at 05:47

## 2020-05-29 RX ADMIN — PANTOPRAZOLE SODIUM 40 MILLIGRAM(S): 20 TABLET, DELAYED RELEASE ORAL at 05:36

## 2020-05-29 RX ADMIN — Medication 500 MILLIGRAM(S): at 12:50

## 2020-05-29 RX ADMIN — GABAPENTIN 300 MILLIGRAM(S): 400 CAPSULE ORAL at 05:36

## 2020-05-29 RX ADMIN — Medication 2: at 21:45

## 2020-05-29 RX ADMIN — BRIMONIDINE TARTRATE 1 DROP(S): 2 SOLUTION/ DROPS OPHTHALMIC at 12:04

## 2020-05-29 RX ADMIN — DORZOLAMIDE HYDROCHLORIDE 1 DROP(S): 20 SOLUTION/ DROPS OPHTHALMIC at 21:54

## 2020-05-29 NOTE — PROGRESS NOTE ADULT - SUBJECTIVE AND OBJECTIVE BOX
O/N Events:  LUCA  sNa stabl3 133, am labs pending     VITALS  Vital Signs Last 24 Hrs  T(C): 36.9 (29 May 2020 05:18), Max: 36.9 (29 May 2020 05:18)  T(F): 98.5 (29 May 2020 05:18), Max: 98.5 (29 May 2020 05:18)  HR: 100 (29 May 2020 05:18) (67 - 100)  BP: 124/71 (29 May 2020 05:18) (124/71 - 151/71)  BP(mean): 97 (28 May 2020 21:05) (97 - 97)  RR: 18 (29 May 2020 05:18) (17 - 18)  SpO2: 100% (29 May 2020 05:18) (100% - 100%)    PHYSICAL EXAM  Deferred per isolation protocol     MEDICATIONS  (STANDING):  amLODIPine   Tablet 10 milliGRAM(s) Oral daily  AQUAPHOR (petrolatum Ointment) 1 Application(s) Topical two times a day  atorvastatin 10 milliGRAM(s) Oral at bedtime  baclofen 2.5 milliGRAM(s) Oral every 12 hours  brimonidine 0.2% Ophthalmic Solution 1 Drop(s) Both EYES three times a day  dextrose 5%. 1000 milliLiter(s) (50 mL/Hr) IV Continuous <Continuous>  dextrose 50% Injectable 12.5 Gram(s) IV Push once  dextrose 50% Injectable 25 Gram(s) IV Push once  dextrose 50% Injectable 25 Gram(s) IV Push once  dorzolamide 2% Ophthalmic Solution 1 Drop(s) Both EYES three times a day  gabapentin 300 milliGRAM(s) Oral every 8 hours  heparin   Injectable 7500 Unit(s) SubCutaneous every 8 hours  insulin glargine Injectable (LANTUS) 20 Unit(s) SubCutaneous at bedtime  insulin lispro (HumaLOG) corrective regimen sliding scale   SubCutaneous Before meals and at bedtime  insulin lispro Injectable (HumaLOG) 8 Unit(s) SubCutaneous three times a day before meals  loperamide 2 milliGRAM(s) Oral daily  pantoprazole    Tablet 40 milliGRAM(s) Oral every 12 hours  rifAXIMin 550 milliGRAM(s) Oral two times a day  sodium bicarbonate 650 milliGRAM(s) Oral three times a day  sodium chloride 1 Gram(s) Oral three times a day  traZODone 100 milliGRAM(s) Oral at bedtime    MEDICATIONS  (PRN):  acetaminophen   Tablet .. 500 milliGRAM(s) Oral every 6 hours PRN Mild Pain (1 - 3), Moderate Pain (4 - 6)  dextrose 40% Gel 15 Gram(s) Oral once PRN Blood Glucose LESS THAN 70 milliGRAM(s)/deciliter  glucagon  Injectable 1 milliGRAM(s) IntraMuscular once PRN Glucose LESS THAN 70 milligrams/deciliter  loperamide 2 milliGRAM(s) Oral daily PRN high ostomy output      LABS    05-28    133<L>  |  100  |  26<H>  ----------------------------<  90  4.5   |  22  |  1.19    Ca    9.5      28 May 2020 11:09  Phos  3.4     05-28  Mg     1.9     05-28

## 2020-05-29 NOTE — PROGRESS NOTE ADULT - ASSESSMENT
60M PMH HTN, DM (w/ neuropathy), IVDU, ETOH liver cirrhosis who presented to [facility] s/p fall at home; was found with toxic megacolon and admitted to SICU; now s/p ex lap + subtotal colectomy, ileostomy (3/31). Hospital course complicated by COVID, MEG, and anemia with suspicion for GI source/varices (refused further GI workup). Now stable for discharge and pending establishment of proper ostomy care services. COVID negative on 5/23.

## 2020-05-29 NOTE — PROGRESS NOTE ADULT - PROBLEM SELECTOR PLAN 7
Type 2 DM. A1C 6.9% on 4/5.  - increased Lantus to 20 units in the evening  - continue Lispro 8 units TID pre-meal   - continue mISS  - discontinued metformin  - f/u endo recs    #Hypothyroidism   TSH 2.813; TPO and antithyroid Abs neg. Levothyroxine 50 mcg discontinued per endocrinology 4/29 given low concern for autoimmune hypothyroidism at this time; will assess necessity of continuation of levothyroxine.  - f/u endo recs

## 2020-05-29 NOTE — PROGRESS NOTE ADULT - SUBJECTIVE AND OBJECTIVE BOX
Patient is a 60y old  Male who presents with a chief complaint of COVID (25 May 2020 08:33)      INTERVAL HPI/OVERNIGHT EVENTS:    Pt. seen and examined earlier today  Increase in ostomy output per report  Pt. otherwise has no complaints  No F/C, CP, SOB, cough    Review of Systems: 12 point review of systems otherwise negative    MEDICATIONS  (STANDING):  amLODIPine   Tablet 10 milliGRAM(s) Oral daily  AQUAPHOR (petrolatum Ointment) 1 Application(s) Topical two times a day  atorvastatin 10 milliGRAM(s) Oral at bedtime  baclofen 2.5 milliGRAM(s) Oral every 12 hours  brimonidine 0.2% Ophthalmic Solution 1 Drop(s) Both EYES three times a day  dextrose 5%. 1000 milliLiter(s) (50 mL/Hr) IV Continuous <Continuous>  dextrose 50% Injectable 12.5 Gram(s) IV Push once  dextrose 50% Injectable 25 Gram(s) IV Push once  dextrose 50% Injectable 25 Gram(s) IV Push once  dorzolamide 2% Ophthalmic Solution 1 Drop(s) Both EYES three times a day  gabapentin 300 milliGRAM(s) Oral every 8 hours  heparin   Injectable 7500 Unit(s) SubCutaneous every 8 hours  insulin glargine Injectable (LANTUS) 20 Unit(s) SubCutaneous at bedtime  insulin lispro (HumaLOG) corrective regimen sliding scale   SubCutaneous Before meals and at bedtime  insulin lispro Injectable (HumaLOG) 8 Unit(s) SubCutaneous three times a day before meals  loperamide 2 milliGRAM(s) Oral daily  pantoprazole    Tablet 40 milliGRAM(s) Oral every 12 hours  rifAXIMin 550 milliGRAM(s) Oral two times a day  sodium bicarbonate 650 milliGRAM(s) Oral three times a day  sodium chloride 1 Gram(s) Oral three times a day  traZODone 100 milliGRAM(s) Oral at bedtime    MEDICATIONS  (PRN):  acetaminophen   Tablet .. 500 milliGRAM(s) Oral every 6 hours PRN Mild Pain (1 - 3), Moderate Pain (4 - 6)  dextrose 40% Gel 15 Gram(s) Oral once PRN Blood Glucose LESS THAN 70 milliGRAM(s)/deciliter  glucagon  Injectable 1 milliGRAM(s) IntraMuscular once PRN Glucose LESS THAN 70 milligrams/deciliter  loperamide 2 milliGRAM(s) Oral daily PRN high ostomy output      Allergies    No Known Allergies    Intolerances          Vital Signs Last 24 Hrs  T(C): 36.9 (29 May 2020 05:18), Max: 36.9 (29 May 2020 05:18)  T(F): 98.5 (29 May 2020 05:18), Max: 98.5 (29 May 2020 05:18)  HR: 100 (29 May 2020 05:18) (67 - 100)  BP: 124/71 (29 May 2020 05:18) (124/71 - 151/71)  BP(mean): 97 (28 May 2020 21:05) (97 - 97)  RR: 18 (29 May 2020 05:18) (17 - 18)  SpO2: 100% (29 May 2020 05:18) (100% - 100%)  CAPILLARY BLOOD GLUCOSE      POCT Blood Glucose.: 159 mg/dL (29 May 2020 07:51)  POCT Blood Glucose.: 224 mg/dL (28 May 2020 21:08)        Physical Exam:  (earlier today)  Daily     Daily   General:  well-appearing in NAD, sitting in a chair  HEENT:  MMM  Abdomen: +ileostomy  Neuro:  AAOx3    LABS:    05-28    133<L>  |  100  |  26<H>  ----------------------------<  90  4.5   |  22  |  1.19    Ca    9.5      28 May 2020 11:09  Phos  3.4     05-28  Mg     1.9     05-28              RADIOLOGY & ADDITIONAL TESTS:    ---------------------------------------------------------------------------  I personally reviewed: [  ]EKG   [  ]CXR    [  ] CT    [  ]Other  ---------------------------------------------------------------------------  PLEASE CHECK WHEN PRESENT:     [  ]Heart Failure     [  ] Acute     [  ] Acute on Chronic     [  ] Chronic  -------------------------------------------------------------------     [  ]Diastolic [HFpEF]     [  ]Systolic [HFrEF]     [  ]Combined [HFpEF & HFrEF]     [  ]Other:  -------------------------------------------------------------------  [  ]MEG     [  ]ATN     [  ]Reneal Medullary Necrosis     [  ]Renal Cortical Necrosis     [  ]Other Pathological Lesions:    [  ]CKD 1  [  ]CKD 2  [  ]CKD 3  [  ]CKD 4  [  ]CKD 5  [  ]Other  -------------------------------------------------------------------  [  ]Other/Unspecified:    --------------------------------------------------------------------    Abdominal Nutritional Status  [  ]Malnutrition: See Nutrition Note  [  ]Cachexia  [  ]Other:   [  ]Supplement Ordered:  [  ]Morbid Obesity (BMI >=40]

## 2020-05-29 NOTE — CHART NOTE - NSCHARTNOTEFT_GEN_A_CORE
Admitting Diagnosis:   Patient is a 60y old  Male who presents with a chief complaint of COVID (25 May 2020 08:33)      PAST MEDICAL & SURGICAL HISTORY:  Anemia  ETOH abuse  HLD (hyperlipidemia)  HTN (hypertension)  DM (diabetes mellitus)  No significant past surgical history      Current Nutrition Order:C-CHO no snack/Low fiber and 1 liter fluid       PO Intake: Good (%) [ x  ]  Fair (50-75%) [   ] Poor (<25%) [   ]    GI Issues: s/p subtotal colectomy and ileostomy    Pain :not noted    Skin Integrity:improved ileostomy wound  Labs:   05-28    133<L>  |  100  |  26<H>  ----------------------------<  90  4.5   |  22  |  1.19    Ca    9.5      28 May 2020 11:09  Phos  3.4     05-28  Mg     1.9     05-28      CAPILLARY BLOOD GLUCOSE      POCT Blood Glucose.: 159 mg/dL (29 May 2020 07:51)  POCT Blood Glucose.: 224 mg/dL (28 May 2020 21:08)  POCT Blood Glucose.: 104 mg/dL (28 May 2020 11:43)      Medications:  MEDICATIONS  (STANDING):  amLODIPine   Tablet 10 milliGRAM(s) Oral daily  AQUAPHOR (petrolatum Ointment) 1 Application(s) Topical two times a day  atorvastatin 10 milliGRAM(s) Oral at bedtime  baclofen 2.5 milliGRAM(s) Oral every 12 hours  brimonidine 0.2% Ophthalmic Solution 1 Drop(s) Both EYES three times a day  dextrose 5%. 1000 milliLiter(s) (50 mL/Hr) IV Continuous <Continuous>  dextrose 50% Injectable 12.5 Gram(s) IV Push once  dextrose 50% Injectable 25 Gram(s) IV Push once  dextrose 50% Injectable 25 Gram(s) IV Push once  dorzolamide 2% Ophthalmic Solution 1 Drop(s) Both EYES three times a day  gabapentin 300 milliGRAM(s) Oral every 8 hours  heparin   Injectable 7500 Unit(s) SubCutaneous every 8 hours  insulin glargine Injectable (LANTUS) 20 Unit(s) SubCutaneous at bedtime  insulin lispro (HumaLOG) corrective regimen sliding scale   SubCutaneous Before meals and at bedtime  insulin lispro Injectable (HumaLOG) 8 Unit(s) SubCutaneous three times a day before meals  loperamide 2 milliGRAM(s) Oral daily  pantoprazole    Tablet 40 milliGRAM(s) Oral every 12 hours  rifAXIMin 550 milliGRAM(s) Oral two times a day  sodium bicarbonate 650 milliGRAM(s) Oral three times a day  sodium chloride 1 Gram(s) Oral three times a day  traZODone 100 milliGRAM(s) Oral at bedtime    MEDICATIONS  (PRN):  acetaminophen   Tablet .. 500 milliGRAM(s) Oral every 6 hours PRN Mild Pain (1 - 3), Moderate Pain (4 - 6)  dextrose 40% Gel 15 Gram(s) Oral once PRN Blood Glucose LESS THAN 70 milliGRAM(s)/deciliter  glucagon  Injectable 1 milliGRAM(s) IntraMuscular once PRN Glucose LESS THAN 70 milligrams/deciliter  loperamide 2 milliGRAM(s) Oral daily PRN high ostomy output      Weight:90.7kg(3/31)  Daily     Daily     Weight Change: no updated weights    Estimated energy needs: Based on IBW due to only recorded weight is above 120% of IBW.IBW:67.3kg [id number]:1682-2019kcal and x1.2-1.4gmprotein(increased due to COVID-19 and wound demands):81-94gmprotein and 1liter fluid per MD due to hyponatremia     Subjective: 61y/o male with history of 2TDM,IVDU.ETOH with liver Cirrhosis admitted for toxic megacolon/s./p subtotal colectomy and ileostomy and found to be COVID-19+.Difficutly with placement due to homelessness(pending shelter referral).Eating 100% of meals. Patient requesting increased portions due to hunger.    Previous Nutrition Diagnosis:Increased nutrient needs r/t increased demand for protein and nutrients AEB: COVID-19 and wound demands    Active [ x  ]  Resolved [   ]    If resolved, new PES:     Goal:Meet 80% of needs consistently    Recommendations:1.Please order repeat weights 2.Add 2 Glucerna shakes (440kcal and 20gmprotein)    Education: completed    Risk Level: High [   ] Moderate [ x  ] Low [   ]

## 2020-05-29 NOTE — PROGRESS NOTE ADULT - PROBLEM SELECTOR PLAN 1
Hypovolemic hyponatremia with low urinary sodium and good response to NS  sNa improved to 133  -encourage Po intake and hydration/ please remove Na restriction from diet  will follow am labs

## 2020-05-29 NOTE — PROGRESS NOTE ADULT - PROBLEM SELECTOR PLAN 5
cont. basal-bolus insulin regimen per Endocrine recs; metformin d/c'ed; cont. Neurontin for diabetic neuropathy, diabetic diet

## 2020-05-29 NOTE — PROGRESS NOTE ADULT - PROBLEM SELECTOR PLAN 5
Normocytic, likely 2/2 chronic disease + kidney dysfunction. Hemoglobin stable around 8-9. S/p 7u pRBCs total this admission. Initial concern for GI source, however patient refused further workup and GI states capsule endoscopy no longer indicated; ostomy output has remained non-bloody.  - goal hemoglobin >7  - pantoprazole 40 mg PO BID    #Hyponatremia  Hypovolemic hyponatremia. Stable in low 130s. S/p  cc bolus.  - salt tabs 1 g TID  - fluid restriction  - f/u Uosm, Tanvi  - holding diuretics  - daily BMP  - f/u renal recs

## 2020-05-29 NOTE — PROGRESS NOTE ADULT - ATTENDING COMMENTS
Glucoses were in the 200 range yesterday, decreased to 150/137 today.  To continue the present regimen, discontinue metformin

## 2020-05-29 NOTE — PROGRESS NOTE ADULT - SUBJECTIVE AND OBJECTIVE BOX
INTERVAL HPI/OVERNIGHT EVENTS:    Patient is a 60y old  Male who presents with a chief complaint of COVID (25 May 2020 08:33)      Pt reports the following symptoms:    CONSTITUTIONAL:  Negative fever or chills, feels well, good appetite  EYES:  Negative  blurry vision or double vision  CARDIOVASCULAR:  Negative for chest pain or palpitations  RESPIRATORY:  Negative for cough, wheezing, or SOB   GASTROINTESTINAL:  Negative for nausea, vomiting, diarrhea, constipation, or abdominal pain  GENITOURINARY:  Negative frequency, urgency or dysuria  NEUROLOGIC:  No headache, confusion, dizziness, lightheadedness    MEDICATIONS  (STANDING):  amLODIPine   Tablet 10 milliGRAM(s) Oral daily  AQUAPHOR (petrolatum Ointment) 1 Application(s) Topical two times a day  atorvastatin 10 milliGRAM(s) Oral at bedtime  baclofen 2.5 milliGRAM(s) Oral every 12 hours  brimonidine 0.2% Ophthalmic Solution 1 Drop(s) Both EYES three times a day  dextrose 5%. 1000 milliLiter(s) (50 mL/Hr) IV Continuous <Continuous>  dextrose 50% Injectable 12.5 Gram(s) IV Push once  dextrose 50% Injectable 25 Gram(s) IV Push once  dextrose 50% Injectable 25 Gram(s) IV Push once  dorzolamide 2% Ophthalmic Solution 1 Drop(s) Both EYES three times a day  gabapentin 300 milliGRAM(s) Oral every 8 hours  heparin   Injectable 7500 Unit(s) SubCutaneous every 8 hours  insulin glargine Injectable (LANTUS) 20 Unit(s) SubCutaneous at bedtime  insulin lispro (HumaLOG) corrective regimen sliding scale   SubCutaneous Before meals and at bedtime  insulin lispro Injectable (HumaLOG) 8 Unit(s) SubCutaneous three times a day before meals  loperamide 2 milliGRAM(s) Oral daily  pantoprazole    Tablet 40 milliGRAM(s) Oral every 12 hours  rifAXIMin 550 milliGRAM(s) Oral two times a day  sodium bicarbonate 650 milliGRAM(s) Oral three times a day  sodium chloride 1 Gram(s) Oral three times a day  traZODone 100 milliGRAM(s) Oral at bedtime    MEDICATIONS  (PRN):  acetaminophen   Tablet .. 500 milliGRAM(s) Oral every 6 hours PRN Mild Pain (1 - 3), Moderate Pain (4 - 6)  dextrose 40% Gel 15 Gram(s) Oral once PRN Blood Glucose LESS THAN 70 milliGRAM(s)/deciliter  glucagon  Injectable 1 milliGRAM(s) IntraMuscular once PRN Glucose LESS THAN 70 milligrams/deciliter  loperamide 2 milliGRAM(s) Oral daily PRN high ostomy output      PHYSICAL EXAM  Vital Signs Last 24 Hrs  T(C): 36.9 (29 May 2020 05:18), Max: 36.9 (29 May 2020 05:18)  T(F): 98.5 (29 May 2020 05:18), Max: 98.5 (29 May 2020 05:18)  HR: 100 (29 May 2020 05:18) (67 - 100)  BP: 124/71 (29 May 2020 05:18) (124/71 - 151/71)  BP(mean): 97 (28 May 2020 21:05) (97 - 97)  RR: 18 (29 May 2020 05:18) (17 - 18)  SpO2: 100% (29 May 2020 05:18) (100% - 100%)    Constitutional: wn/wd in NAD.   HEENT: NCAT, MMM, OP clear, EOMI, no proptosis or lid retraction  Neck: no thyromegaly or palpable thyroid nodules   Respiratory: lungs CTAB.  Cardiovascular: regular rhythm, normal S1 and S2, no audible murmurs, no peripheral edema  GI: soft, NT/ND, no masses/HSM appreciated.  Neurology: no tremors, DTR 2+  Skin: no visible rashes/lesions  Psychiatric: AAO x 3, normal affect/mood.    LABS:    05-28    133<L>  |  100  |  26<H>  ----------------------------<  90  4.5   |  22  |  1.19    Ca    9.5      28 May 2020 11:09  Phos  3.4     05-28  Mg     1.9     05-28          Thyroid Stimulating Hormone, Serum: 2.613 uIU/mL (05-26 @ 10:28)  Thyroid Stimulating Hormone, Serum: 2.389 uIU/mL (05-18 @ 07:38)  Thyroid Stimulating Hormone, Serum: 3.869 uIU/mL (05-13 @ 06:50)  Thyroid Stimulating Hormone, Serum: 3.104 uIU/mL (05-11 @ 06:10)  Thyroid Stimulating Hormone, Serum: 2.712 uIU/mL (05-07 @ 06:31)  Thyroid Stimulating Hormone, Serum: 3.063 uIU/mL (05-07 @ 06:31)  Thyroid Stimulating Hormone, Serum: 3.509 uIU/mL (05-03 @ 06:07)  Thyroid Stimulating Hormone, Serum: 3.325 uIU/mL (04-29 @ 06:17)  Thyroid Stimulating Hormone, Serum: 2.813 uIU/mL (04-14 @ 07:28)  Thyroid Stimulating Hormone, Serum: 2.877 uIU/mL (04-14 @ 00:34)      HbA1C: 6.9 % (04-05 @ 06:59)  7.6 % (03-31 @ 05:58)    CAPILLARY BLOOD GLUCOSE      POCT Blood Glucose.: 137 mg/dL (29 May 2020 12:06)  POCT Blood Glucose.: 159 mg/dL (29 May 2020 07:51)  POCT Blood Glucose.: 224 mg/dL (28 May 2020 21:08)      Insulin Sliding Scale requirements X 24 Hours:    RADIOLOGY & ADDITIONAL TESTS:    A/P: 60y Male with history of DM type II presenting for       1.  DM -     Please continue           units lantus at bedtime  / in the morning and        units lispro with meals and lispro moderate / low dose sliding scale 4 times daily with meals and at bedtime.  Please continue consistent carbohydrate diet.      Goal FSG is   Will continue to monitor   For discharge, pt can continue    Pt can follow up at discharge with [facility] by calling  to make an appointment.   Will discuss case with     and update primary team INTERVAL HPI/OVERNIGHT EVENTS:    Patient is a 60y old  Male who presents with a chief complaint of COVID (25 May 2020 08:33)  Spoke to patient via phone.   he said that he had regular candies that his friend brought him yesterday evening.  No other acute events o/n.   Saturating well on RA. appetite is good, continues to complain of food trays not being enough. He may benefit from being given some extra portion of vegs and meat  Dispo pending rehab placement  No labs today. On salt tabs 1gm TID    FSG & Insulin received:  Yesterday:  pre-dinner fs, 8 + 4 units lispro   bedtime fs, 20 lantus   units + 4 units lispro SS  Today:  pre-breakfast fs, 8 + 2 units lispro   pre-lunch fs    Pt reports the following symptoms:  CONSTITUTIONAL:  Negative fever or chills  CARDIOVASCULAR:  Negative for chest pain or palpitations  RESPIRATORY:  Negative for cough, wheezing, or SOB   GASTROINTESTINAL:  Negative for vomiting, diarrhea, constipation, or abdominal pain  NEUROLOGIC:  No headache, confusion, dizziness, lightheadedness      MEDICATIONS  (STANDING):  amLODIPine   Tablet 10 milliGRAM(s) Oral daily  AQUAPHOR (petrolatum Ointment) 1 Application(s) Topical two times a day  atorvastatin 10 milliGRAM(s) Oral at bedtime  baclofen 2.5 milliGRAM(s) Oral every 12 hours  brimonidine 0.2% Ophthalmic Solution 1 Drop(s) Both EYES three times a day  dextrose 5%. 1000 milliLiter(s) (50 mL/Hr) IV Continuous <Continuous>  dextrose 50% Injectable 12.5 Gram(s) IV Push once  dextrose 50% Injectable 25 Gram(s) IV Push once  dextrose 50% Injectable 25 Gram(s) IV Push once  dorzolamide 2% Ophthalmic Solution 1 Drop(s) Both EYES three times a day  gabapentin 300 milliGRAM(s) Oral every 8 hours  heparin   Injectable 7500 Unit(s) SubCutaneous every 8 hours  insulin glargine Injectable (LANTUS) 20 Unit(s) SubCutaneous at bedtime  insulin lispro (HumaLOG) corrective regimen sliding scale   SubCutaneous Before meals and at bedtime  insulin lispro Injectable (HumaLOG) 8 Unit(s) SubCutaneous three times a day before meals  loperamide 2 milliGRAM(s) Oral daily  pantoprazole    Tablet 40 milliGRAM(s) Oral every 12 hours  rifAXIMin 550 milliGRAM(s) Oral two times a day  sodium bicarbonate 650 milliGRAM(s) Oral three times a day  sodium chloride 1 Gram(s) Oral three times a day  traZODone 100 milliGRAM(s) Oral at bedtime    MEDICATIONS  (PRN):  acetaminophen   Tablet .. 500 milliGRAM(s) Oral every 6 hours PRN Mild Pain (1 - 3), Moderate Pain (4 - 6)  dextrose 40% Gel 15 Gram(s) Oral once PRN Blood Glucose LESS THAN 70 milliGRAM(s)/deciliter  glucagon  Injectable 1 milliGRAM(s) IntraMuscular once PRN Glucose LESS THAN 70 milligrams/deciliter  loperamide 2 milliGRAM(s) Oral daily PRN high ostomy output      PHYSICAL EXAM  Vital Signs Last 24 Hrs  T(C): 36.9 (29 May 2020 05:18), Max: 36.9 (29 May 2020 05:18)  T(F): 98.5 (29 May 2020 05:18), Max: 98.5 (29 May 2020 05:18)  HR: 100 (29 May 2020 05:18) (67 - 100)  BP: 124/71 (29 May 2020 05:18) (124/71 - 151/71)  BP(mean): 97 (28 May 2020 21:05) (97 - 97)  RR: 18 (29 May 2020 05:18) (17 - 18)  SpO2: 100% (29 May 2020 05:18) (100% - 100%)    Due to the nature of this patient’s COVID-19 isolation status (either confirmed or suspected), this note was prepared without a bedside physical examination to prevent spread of infection and to conserve personal protective equipment during this nationwide pandemic. If possible, direct patient communication occurred via electronic measures or telephone conversation. Examination highlights were provided by a bedside nurse wearing personal protective equipment and review of pertinent medical records. Objective data (vital signs, urine output, lab results, imaging studies, medications, etc) were reviewed in detail. Face to face visits and physical examination have been limited only to patients for whom it is required for medical decision making.    LABS:        133<L>  |  100  |  26<H>  ----------------------------<  90  4.5   |  22  |  1.19    Ca    9.5      28 May 2020 11:09  Phos  3.4     -  Mg     1.9               Thyroid Stimulating Hormone, Serum: 2.613 uIU/mL ( @ 10:28)  Thyroid Stimulating Hormone, Serum: 2.389 uIU/mL ( @ 07:38)  Thyroid Stimulating Hormone, Serum: 3.869 uIU/mL ( @ 06:50)  Thyroid Stimulating Hormone, Serum: 3.104 uIU/mL ( @ 06:10)  Thyroid Stimulating Hormone, Serum: 2.712 uIU/mL ( @ 06:31)  Thyroid Stimulating Hormone, Serum: 3.063 uIU/mL ( @ 06:31)  Thyroid Stimulating Hormone, Serum: 3.509 uIU/mL ( @ 06:07)  Thyroid Stimulating Hormone, Serum: 3.325 uIU/mL ( @ 06:17)  Thyroid Stimulating Hormone, Serum: 2.813 uIU/mL ( @ 07:28)  Thyroid Stimulating Hormone, Serum: 2.877 uIU/mL ( @ 00:34)      HbA1C: 6.9 % ( @ 06:59)  7.6 % ( @ 05:58)    CAPILLARY BLOOD GLUCOSE      POCT Blood Glucose.: 137 mg/dL (29 May 2020 12:06)  POCT Blood Glucose.: 159 mg/dL (29 May 2020 07:51)  POCT Blood Glucose.: 224 mg/dL (28 May 2020 21:08)      Insulin Sliding Scale requirements X 24 Hours:    RADIOLOGY & ADDITIONAL TESTS:    A/P 60yMale with hx of DM now with COVID, worsening renal function, moderate hyperglycemia.     1.  DM: type 2  Please continue Lantus 20 units at bedtime  Please continue Lispro 8  units tid with meals.   Continue lispro moderate dose scale with meals and at bedtime.   Continue consistent carb diet  FSG Goal 100-180  GFR 40 and EF 65%    2.  Hyperlipidemia - goal LDL < 70  - on lipitor 10 mg once daily    For discharge, TBD    Case seen and discussed with  and updated the primary team.  Pt can follow up at discharge with [facility] by calling  to make an appointment.

## 2020-05-29 NOTE — PROGRESS NOTE ADULT - PROBLEM SELECTOR PLAN 2
Cirrhosis c/b ascites likely 2/2 alcohol abuse. SBP ruled out, hepatic encephalopathy poa now resolved. Paracentesis on 4/10 with negative cytology. Abdominal U/S 5/3 small ascites   - continue Rifaximin 550 mg BID for hepatic encephalopathy  - holding Lasix + Spironolactone for ascites in s/o hyponatremia  - holding Lactulose in setting of high ostomy output  - maximum Tylenol 2 g per day

## 2020-05-29 NOTE — PROGRESS NOTE ADULT - SUBJECTIVE AND OBJECTIVE BOX
O/N Events: no acute events    Subjective: Patient seen at the bedside. Sitting on the side of the bed. Primary complaint of increased ostomy output causing him to have to frequently change his bag. Otherwise no complaints.    VITALS  Vital Signs Last 24 Hrs  T(C): 36.9 (29 May 2020 05:18), Max: 36.9 (29 May 2020 05:18)  T(F): 98.5 (29 May 2020 05:18), Max: 98.5 (29 May 2020 05:18)  HR: 100 (29 May 2020 05:18) (67 - 100)  BP: 124/71 (29 May 2020 05:18) (124/71 - 151/71)  BP(mean): 97 (28 May 2020 21:05) (97 - 97)  RR: 18 (29 May 2020 05:18) (17 - 18)  SpO2: 100% (29 May 2020 05:18) (100% - 100%)    CAPILLARY BLOOD GLUCOSE      POCT Blood Glucose.: 159 mg/dL (29 May 2020 07:51)  POCT Blood Glucose.: 224 mg/dL (28 May 2020 21:08)  POCT Blood Glucose.: 104 mg/dL (28 May 2020 11:43)      PHYSICAL EXAM  General appearance: awake and alert, pleasant  HEENT: EOMI, sclera anicteric  Respiratory: breath sounds clear to auscultation throughout all lung fields, no wheezing  Cardiovascular: regular rate and rhythm  Gastrointestinal: soft, tender to palpation to the left of surgical dressing, midline dressing clean/dry/intact, moderately distended, +bowel sounds, ostomy with orange/brown output  Extremities: WWP, no lower extremity edema  Neurological: alert and oriented, no obvious focal deficits    MEDICATIONS  (STANDING):  amLODIPine   Tablet 10 milliGRAM(s) Oral daily  AQUAPHOR (petrolatum Ointment) 1 Application(s) Topical two times a day  atorvastatin 10 milliGRAM(s) Oral at bedtime  baclofen 2.5 milliGRAM(s) Oral every 12 hours  brimonidine 0.2% Ophthalmic Solution 1 Drop(s) Both EYES three times a day  dextrose 5%. 1000 milliLiter(s) (50 mL/Hr) IV Continuous <Continuous>  dextrose 50% Injectable 12.5 Gram(s) IV Push once  dextrose 50% Injectable 25 Gram(s) IV Push once  dextrose 50% Injectable 25 Gram(s) IV Push once  dorzolamide 2% Ophthalmic Solution 1 Drop(s) Both EYES three times a day  gabapentin 300 milliGRAM(s) Oral every 8 hours  heparin   Injectable 7500 Unit(s) SubCutaneous every 8 hours  insulin glargine Injectable (LANTUS) 20 Unit(s) SubCutaneous at bedtime  insulin lispro (HumaLOG) corrective regimen sliding scale   SubCutaneous Before meals and at bedtime  insulin lispro Injectable (HumaLOG) 8 Unit(s) SubCutaneous three times a day before meals  loperamide 2 milliGRAM(s) Oral daily  pantoprazole    Tablet 40 milliGRAM(s) Oral every 12 hours  rifAXIMin 550 milliGRAM(s) Oral two times a day  sodium bicarbonate 650 milliGRAM(s) Oral three times a day  sodium chloride 1 Gram(s) Oral three times a day  traZODone 100 milliGRAM(s) Oral at bedtime    MEDICATIONS  (PRN):  acetaminophen   Tablet .. 500 milliGRAM(s) Oral every 6 hours PRN Mild Pain (1 - 3), Moderate Pain (4 - 6)  dextrose 40% Gel 15 Gram(s) Oral once PRN Blood Glucose LESS THAN 70 milliGRAM(s)/deciliter  glucagon  Injectable 1 milliGRAM(s) IntraMuscular once PRN Glucose LESS THAN 70 milligrams/deciliter      No Known Allergies      LABS                        8.9    10.26 )-----------( 196      ( 27 May 2020 10:23 )             26.9     05-28    133<L>  |  100  |  26<H>  ----------------------------<  90  4.5   |  22  |  1.19    Ca    9.5      28 May 2020 11:09  Phos  3.4     05-28  Mg     1.9     05-28                PROCEDURES/IMAGING: reviewed.

## 2020-05-29 NOTE — PROGRESS NOTE ADULT - PROBLEM SELECTOR PLAN 2
likely multifactorial; renal fxn has improved to baseline; Renal following; cont. sodium bicarb, renal diet; monitor BMP off diuretics, renally-dose rx

## 2020-05-29 NOTE — PROGRESS NOTE ADULT - PROBLEM SELECTOR PLAN 3
S/p ex lap with subtotal colectomy+ileostomy on 3/31. Course c/b fever and purulent wound infection (4/9) s/p vanc/zosyn, now resolved. No melena in ostomy for multiple days.  - daily dressings for midline incision  - continue Loperamide 2 mg daily (will be able to get 7 days of meds on d/c)  - general surgery following, appreciate recs  - wound care

## 2020-05-30 LAB
GLUCOSE BLDC GLUCOMTR-MCNC: 156 MG/DL — HIGH (ref 70–99)
GLUCOSE BLDC GLUCOMTR-MCNC: 374 MG/DL — HIGH (ref 70–99)
GLUCOSE BLDC GLUCOMTR-MCNC: 92 MG/DL — SIGNIFICANT CHANGE UP (ref 70–99)
GLUCOSE BLDC GLUCOMTR-MCNC: 97 MG/DL — SIGNIFICANT CHANGE UP (ref 70–99)

## 2020-05-30 PROCEDURE — 99233 SBSQ HOSP IP/OBS HIGH 50: CPT | Mod: GC

## 2020-05-30 RX ADMIN — Medication 1 APPLICATION(S): at 05:43

## 2020-05-30 RX ADMIN — Medication 650 MILLIGRAM(S): at 05:40

## 2020-05-30 RX ADMIN — SODIUM CHLORIDE 1 GRAM(S): 9 INJECTION INTRAMUSCULAR; INTRAVENOUS; SUBCUTANEOUS at 05:40

## 2020-05-30 RX ADMIN — HEPARIN SODIUM 7500 UNIT(S): 5000 INJECTION INTRAVENOUS; SUBCUTANEOUS at 14:01

## 2020-05-30 RX ADMIN — Medication 8 UNIT(S): at 11:48

## 2020-05-30 RX ADMIN — BRIMONIDINE TARTRATE 1 DROP(S): 2 SOLUTION/ DROPS OPHTHALMIC at 16:09

## 2020-05-30 RX ADMIN — Medication 650 MILLIGRAM(S): at 14:01

## 2020-05-30 RX ADMIN — AMLODIPINE BESYLATE 10 MILLIGRAM(S): 2.5 TABLET ORAL at 05:41

## 2020-05-30 RX ADMIN — SODIUM CHLORIDE 1 GRAM(S): 9 INJECTION INTRAMUSCULAR; INTRAVENOUS; SUBCUTANEOUS at 14:01

## 2020-05-30 RX ADMIN — DORZOLAMIDE HYDROCHLORIDE 1 DROP(S): 20 SOLUTION/ DROPS OPHTHALMIC at 16:10

## 2020-05-30 RX ADMIN — GABAPENTIN 300 MILLIGRAM(S): 400 CAPSULE ORAL at 05:40

## 2020-05-30 RX ADMIN — PANTOPRAZOLE SODIUM 40 MILLIGRAM(S): 20 TABLET, DELAYED RELEASE ORAL at 17:00

## 2020-05-30 RX ADMIN — Medication 500 MILLIGRAM(S): at 05:40

## 2020-05-30 RX ADMIN — GABAPENTIN 300 MILLIGRAM(S): 400 CAPSULE ORAL at 14:02

## 2020-05-30 RX ADMIN — Medication 2.5 MILLIGRAM(S): at 05:41

## 2020-05-30 RX ADMIN — Medication 2.5 MILLIGRAM(S): at 17:53

## 2020-05-30 RX ADMIN — DORZOLAMIDE HYDROCHLORIDE 1 DROP(S): 20 SOLUTION/ DROPS OPHTHALMIC at 21:57

## 2020-05-30 RX ADMIN — Medication 650 MILLIGRAM(S): at 22:17

## 2020-05-30 RX ADMIN — Medication 8 UNIT(S): at 16:51

## 2020-05-30 RX ADMIN — GABAPENTIN 300 MILLIGRAM(S): 400 CAPSULE ORAL at 22:17

## 2020-05-30 RX ADMIN — BRIMONIDINE TARTRATE 1 DROP(S): 2 SOLUTION/ DROPS OPHTHALMIC at 05:53

## 2020-05-30 RX ADMIN — DORZOLAMIDE HYDROCHLORIDE 1 DROP(S): 20 SOLUTION/ DROPS OPHTHALMIC at 05:54

## 2020-05-30 RX ADMIN — Medication 2 MILLIGRAM(S): at 11:49

## 2020-05-30 RX ADMIN — INSULIN GLARGINE 20 UNIT(S): 100 INJECTION, SOLUTION SUBCUTANEOUS at 22:16

## 2020-05-30 RX ADMIN — SODIUM CHLORIDE 1 GRAM(S): 9 INJECTION INTRAMUSCULAR; INTRAVENOUS; SUBCUTANEOUS at 22:17

## 2020-05-30 RX ADMIN — Medication 8 UNIT(S): at 08:38

## 2020-05-30 RX ADMIN — Medication 1 APPLICATION(S): at 18:25

## 2020-05-30 RX ADMIN — HEPARIN SODIUM 7500 UNIT(S): 5000 INJECTION INTRAVENOUS; SUBCUTANEOUS at 05:43

## 2020-05-30 RX ADMIN — HEPARIN SODIUM 7500 UNIT(S): 5000 INJECTION INTRAVENOUS; SUBCUTANEOUS at 22:16

## 2020-05-30 RX ADMIN — ATORVASTATIN CALCIUM 10 MILLIGRAM(S): 80 TABLET, FILM COATED ORAL at 22:17

## 2020-05-30 RX ADMIN — Medication 100 MILLIGRAM(S): at 22:17

## 2020-05-30 RX ADMIN — BRIMONIDINE TARTRATE 1 DROP(S): 2 SOLUTION/ DROPS OPHTHALMIC at 21:57

## 2020-05-30 RX ADMIN — Medication 2: at 08:37

## 2020-05-30 RX ADMIN — PANTOPRAZOLE SODIUM 40 MILLIGRAM(S): 20 TABLET, DELAYED RELEASE ORAL at 05:41

## 2020-05-30 RX ADMIN — Medication 10: at 16:51

## 2020-05-30 NOTE — PROGRESS NOTE ADULT - ASSESSMENT
60M PMH HTN, DM (w/ neuropathy), IVDU, ETOH liver cirrhosis who presented to [facility] s/p fall at home; was found with toxic megacolon and admitted to SICU; now s/p ex lap + subtotal colectomy, ileostomy (3/31). Hospital course complicated by COVID, MEG, and anemia with suspicion for GI source/varices (refused further GI workup). Now stable for discharge and pending establishment of proper ostomy care services. COVID negative on 5/23.

## 2020-05-30 NOTE — PROGRESS NOTE ADULT - ATTENDING COMMENTS
A/P 60yMale with hx of DM presenting for management of abdominal pain, found to have pnuematosis, s/p subtotal colectomy c/b COVID infection, now stable.    1.  DM: type 2, uncontrolled,   lantus 20 at bedtime, lispro 8 units tid with meals.   Continue lispro moderate dose scale with meals and at bedtime.   Continue consistent carb diet  FSG Goal 100-180    2.  Hyperlipidemia - goal LDL < 70  continue atorvastatin 10mg daily      Pt is advised to follow up with me at discharge by calling .      Evelyne Underwood MD, PhD  Endocrinology  [address]  [place] [address]  [phone] Tel  [phone] Fax  [email or web address]

## 2020-05-30 NOTE — PROGRESS NOTE ADULT - SUBJECTIVE AND OBJECTIVE BOX
INTERVAL HPI/OVERNIGHT EVENTS:    Patient is a 60y old  Male who presents with a chief complaint of COVID (25 May 2020 08:33)  no acute overnight events  insulin administration reviewed  eating well        Pt reports the following symptoms:    CONSTITUTIONAL:  Negative fever or chills, feels well, good appetite  EYES:  Negative  blurry vision or double vision  CARDIOVASCULAR:  Negative for chest pain or palpitations  RESPIRATORY:  Negative for cough, wheezing, or SOB   GASTROINTESTINAL:  Negative for nausea, vomiting, diarrhea, constipation, or abdominal pain  GENITOURINARY:  Negative frequency, urgency or dysuria  NEUROLOGIC:  No headache, confusion, dizziness, lightheadedness    MEDICATIONS  (STANDING):  amLODIPine   Tablet 10 milliGRAM(s) Oral daily  AQUAPHOR (petrolatum Ointment) 1 Application(s) Topical two times a day  atorvastatin 10 milliGRAM(s) Oral at bedtime  baclofen 2.5 milliGRAM(s) Oral every 12 hours  brimonidine 0.2% Ophthalmic Solution 1 Drop(s) Both EYES three times a day  dextrose 5%. 1000 milliLiter(s) (50 mL/Hr) IV Continuous <Continuous>  dextrose 50% Injectable 12.5 Gram(s) IV Push once  dextrose 50% Injectable 25 Gram(s) IV Push once  dextrose 50% Injectable 25 Gram(s) IV Push once  dorzolamide 2% Ophthalmic Solution 1 Drop(s) Both EYES three times a day  gabapentin 300 milliGRAM(s) Oral every 8 hours  heparin   Injectable 7500 Unit(s) SubCutaneous every 8 hours  insulin glargine Injectable (LANTUS) 20 Unit(s) SubCutaneous at bedtime  insulin lispro (HumaLOG) corrective regimen sliding scale   SubCutaneous Before meals and at bedtime  insulin lispro Injectable (HumaLOG) 8 Unit(s) SubCutaneous three times a day before meals  loperamide 2 milliGRAM(s) Oral daily  pantoprazole    Tablet 40 milliGRAM(s) Oral every 12 hours  rifAXIMin 550 milliGRAM(s) Oral two times a day  sodium bicarbonate 650 milliGRAM(s) Oral three times a day  sodium chloride 1 Gram(s) Oral three times a day  traZODone 100 milliGRAM(s) Oral at bedtime    MEDICATIONS  (PRN):  acetaminophen   Tablet .. 500 milliGRAM(s) Oral every 6 hours PRN Mild Pain (1 - 3), Moderate Pain (4 - 6)  dextrose 40% Gel 15 Gram(s) Oral once PRN Blood Glucose LESS THAN 70 milliGRAM(s)/deciliter  glucagon  Injectable 1 milliGRAM(s) IntraMuscular once PRN Glucose LESS THAN 70 milligrams/deciliter  loperamide 2 milliGRAM(s) Oral daily PRN high ostomy output      Past medical history reviewed  Family history reviewed  Social history reviewed    PHYSICAL EXAM  Vital Signs Last 24 Hrs  T(C): 36.6 (30 May 2020 06:05), Max: 37.2 (29 May 2020 21:14)  T(F): 97.8 (30 May 2020 06:05), Max: 98.9 (29 May 2020 21:14)  HR: 62 (30 May 2020 06:05) (58 - 69)  BP: 125/63 (30 May 2020 06:05) (125/63 - 145/73)  BP(mean): 97 (29 May 2020 21:14) (97 - 97)  RR: 18 (30 May 2020 06:05) (18 - 18)  SpO2: 98% (30 May 2020 06:05) (98% - 100%)    Constitutional: wn/wd in NAD.   HEENT: NCAT, MMM, OP clear, EOMI, no proptosis or lid retraction  Neck: no thyromegaly or palpable thyroid nodules   Respiratory: lungs CTAB.  Cardiovascular: regular rhythm, normal S1 and S2, no audible murmurs, no peripheral edema  GI: soft, NT/ND, no masses/HSM appreciated.  Neurology: no tremors, DTR 2+  Skin: no visible rashes/lesions  Psychiatric: AAO x 3, normal affect/mood.    LABS:              Thyroid Stimulating Hormone, Serum: 2.613 uIU/mL (05-26 @ 10:28)  Thyroid Stimulating Hormone, Serum: 2.389 uIU/mL (05-18 @ 07:38)  Thyroid Stimulating Hormone, Serum: 3.869 uIU/mL (05-13 @ 06:50)  Thyroid Stimulating Hormone, Serum: 3.104 uIU/mL (05-11 @ 06:10)  Thyroid Stimulating Hormone, Serum: 2.712 uIU/mL (05-07 @ 06:31)  Thyroid Stimulating Hormone, Serum: 3.063 uIU/mL (05-07 @ 06:31)  Thyroid Stimulating Hormone, Serum: 3.509 uIU/mL (05-03 @ 06:07)  Thyroid Stimulating Hormone, Serum: 3.325 uIU/mL (04-29 @ 06:17)  Thyroid Stimulating Hormone, Serum: 2.813 uIU/mL (04-14 @ 07:28)  Thyroid Stimulating Hormone, Serum: 2.877 uIU/mL (04-14 @ 00:34)      HbA1C: 6.9 % (04-05 @ 06:59)  7.6 % (03-31 @ 05:58)    CAPILLARY BLOOD GLUCOSE      POCT Blood Glucose.: 97 mg/dL (30 May 2020 11:34)  POCT Blood Glucose.: 156 mg/dL (30 May 2020 08:32)  POCT Blood Glucose.: 181 mg/dL (29 May 2020 21:22)  POCT Blood Glucose.: 121 mg/dL (29 May 2020 16:36) INTERVAL HPI/OVERNIGHT EVENTS:    Patient is a 60y old  Male who presents with a chief complaint of COVID (25 May 2020 08:33)  no acute overnight events  insulin administration reviewed  eating well        Pt reports the following symptoms:    CONSTITUTIONAL:  Negative fever or chills, feels well, good appetite  EYES:  Negative  blurry vision or double vision  CARDIOVASCULAR:  Negative for chest pain or palpitations  RESPIRATORY:  Negative for cough, wheezing, or SOB   GASTROINTESTINAL:  Negative for nausea, vomiting, diarrhea, constipation, or abdominal pain  GENITOURINARY:  Negative frequency, urgency or dysuria  NEUROLOGIC:  No headache, confusion, dizziness, lightheadedness    MEDICATIONS  (STANDING):  amLODIPine   Tablet 10 milliGRAM(s) Oral daily  AQUAPHOR (petrolatum Ointment) 1 Application(s) Topical two times a day  atorvastatin 10 milliGRAM(s) Oral at bedtime  baclofen 2.5 milliGRAM(s) Oral every 12 hours  brimonidine 0.2% Ophthalmic Solution 1 Drop(s) Both EYES three times a day  dextrose 5%. 1000 milliLiter(s) (50 mL/Hr) IV Continuous <Continuous>  dextrose 50% Injectable 12.5 Gram(s) IV Push once  dextrose 50% Injectable 25 Gram(s) IV Push once  dextrose 50% Injectable 25 Gram(s) IV Push once  dorzolamide 2% Ophthalmic Solution 1 Drop(s) Both EYES three times a day  gabapentin 300 milliGRAM(s) Oral every 8 hours  heparin   Injectable 7500 Unit(s) SubCutaneous every 8 hours  insulin glargine Injectable (LANTUS) 20 Unit(s) SubCutaneous at bedtime  insulin lispro (HumaLOG) corrective regimen sliding scale   SubCutaneous Before meals and at bedtime  insulin lispro Injectable (HumaLOG) 8 Unit(s) SubCutaneous three times a day before meals  loperamide 2 milliGRAM(s) Oral daily  pantoprazole    Tablet 40 milliGRAM(s) Oral every 12 hours  rifAXIMin 550 milliGRAM(s) Oral two times a day  sodium bicarbonate 650 milliGRAM(s) Oral three times a day  sodium chloride 1 Gram(s) Oral three times a day  traZODone 100 milliGRAM(s) Oral at bedtime    MEDICATIONS  (PRN):  acetaminophen   Tablet .. 500 milliGRAM(s) Oral every 6 hours PRN Mild Pain (1 - 3), Moderate Pain (4 - 6)  dextrose 40% Gel 15 Gram(s) Oral once PRN Blood Glucose LESS THAN 70 milliGRAM(s)/deciliter  glucagon  Injectable 1 milliGRAM(s) IntraMuscular once PRN Glucose LESS THAN 70 milligrams/deciliter  loperamide 2 milliGRAM(s) Oral daily PRN high ostomy output      Past medical history reviewed  Family history reviewed  Social history reviewed    PHYSICAL EXAM  Vital Signs Last 24 Hrs  T(C): 36.6 (30 May 2020 06:05), Max: 37.2 (29 May 2020 21:14)  T(F): 97.8 (30 May 2020 06:05), Max: 98.9 (29 May 2020 21:14)  HR: 62 (30 May 2020 06:05) (58 - 69)  BP: 125/63 (30 May 2020 06:05) (125/63 - 145/73)  BP(mean): 97 (29 May 2020 21:14) (97 - 97)  RR: 18 (30 May 2020 06:05) (18 - 18)  SpO2: 98% (30 May 2020 06:05) (98% - 100%)    Due to the nature of this patient’s COVID-19 isolation status (either confirmed or suspected), this note was prepared without a bedside physical examination to prevent spread of infection and to conserve personal protective equipment during this nationwide pandemic. If possible, direct patient communication occurred via electronic measures or telephone conversation. Examination highlights were provided by a bedside nurse wearing personal protective equipment and review of pertinent medical records. Objective data (vital signs, urine output, lab results, imaging studies, medications, etc) were reviewed in detail. Face to face visits and physical examination have been limited only to patients for whom it is required for medical decision making.LABS:              Thyroid Stimulating Hormone, Serum: 2.613 uIU/mL (05-26 @ 10:28)  Thyroid Stimulating Hormone, Serum: 2.389 uIU/mL (05-18 @ 07:38)  Thyroid Stimulating Hormone, Serum: 3.869 uIU/mL (05-13 @ 06:50)  Thyroid Stimulating Hormone, Serum: 3.104 uIU/mL (05-11 @ 06:10)  Thyroid Stimulating Hormone, Serum: 2.712 uIU/mL (05-07 @ 06:31)  Thyroid Stimulating Hormone, Serum: 3.063 uIU/mL (05-07 @ 06:31)  Thyroid Stimulating Hormone, Serum: 3.509 uIU/mL (05-03 @ 06:07)  Thyroid Stimulating Hormone, Serum: 3.325 uIU/mL (04-29 @ 06:17)  Thyroid Stimulating Hormone, Serum: 2.813 uIU/mL (04-14 @ 07:28)  Thyroid Stimulating Hormone, Serum: 2.877 uIU/mL (04-14 @ 00:34)      HbA1C: 6.9 % (04-05 @ 06:59)  7.6 % (03-31 @ 05:58)    CAPILLARY BLOOD GLUCOSE      POCT Blood Glucose.: 97 mg/dL (30 May 2020 11:34)  POCT Blood Glucose.: 156 mg/dL (30 May 2020 08:32)  POCT Blood Glucose.: 181 mg/dL (29 May 2020 21:22)  POCT Blood Glucose.: 121 mg/dL (29 May 2020 16:36)

## 2020-05-30 NOTE — PROGRESS NOTE ADULT - SUBJECTIVE AND OBJECTIVE BOX
O/N Events: no acute events    Subjective: Patient seen at the bedside. Sitting in chair. Upset about wound itching and pain @ ostomy site. No other complaints.    Vital Signs Last 24 Hrs  T(C): 36.6 (30 May 2020 06:05), Max: 37.2 (29 May 2020 21:14)  T(F): 97.8 (30 May 2020 06:05), Max: 98.9 (29 May 2020 21:14)  HR: 62 (30 May 2020 06:05) (58 - 69)  BP: 125/63 (30 May 2020 06:05) (125/63 - 145/73)  BP(mean): 97 (29 May 2020 21:14) (97 - 97)  RR: 18 (30 May 2020 06:05) (18 - 18)  SpO2: 98% (30 May 2020 06:05) (98% - 100%)    PHYSICAL EXAM  General appearance: awake and alert, pleasant  HEENT: EOMI, sclera anicteric  Respiratory: breath sounds clear to auscultation throughout all lung fields, no wheezing  Cardiovascular: regular rate and rhythm  Gastrointestinal: soft, tender to palpation to the left of surgical dressing, midline dressing clean/dry/intact, moderately distended, +bowel sounds, ostomy with orange/brown output  Extremities: WWP, no lower extremity edema  Neurological: alert and oriented, no obvious focal deficits    MEDICATIONS  (STANDING):  amLODIPine   Tablet 10 milliGRAM(s) Oral daily  AQUAPHOR (petrolatum Ointment) 1 Application(s) Topical two times a day  atorvastatin 10 milliGRAM(s) Oral at bedtime  baclofen 2.5 milliGRAM(s) Oral every 12 hours  brimonidine 0.2% Ophthalmic Solution 1 Drop(s) Both EYES three times a day  dextrose 5%. 1000 milliLiter(s) (50 mL/Hr) IV Continuous <Continuous>  dextrose 50% Injectable 12.5 Gram(s) IV Push once  dextrose 50% Injectable 25 Gram(s) IV Push once  dextrose 50% Injectable 25 Gram(s) IV Push once  dorzolamide 2% Ophthalmic Solution 1 Drop(s) Both EYES three times a day  gabapentin 300 milliGRAM(s) Oral every 8 hours  heparin   Injectable 7500 Unit(s) SubCutaneous every 8 hours  insulin glargine Injectable (LANTUS) 20 Unit(s) SubCutaneous at bedtime  insulin lispro (HumaLOG) corrective regimen sliding scale   SubCutaneous Before meals and at bedtime  insulin lispro Injectable (HumaLOG) 8 Unit(s) SubCutaneous three times a day before meals  loperamide 2 milliGRAM(s) Oral daily  pantoprazole    Tablet 40 milliGRAM(s) Oral every 12 hours  rifAXIMin 550 milliGRAM(s) Oral two times a day  sodium bicarbonate 650 milliGRAM(s) Oral three times a day  sodium chloride 1 Gram(s) Oral three times a day  traZODone 100 milliGRAM(s) Oral at bedtime    MEDICATIONS  (PRN):  acetaminophen   Tablet .. 500 milliGRAM(s) Oral every 6 hours PRN Mild Pain (1 - 3), Moderate Pain (4 - 6)  dextrose 40% Gel 15 Gram(s) Oral once PRN Blood Glucose LESS THAN 70 milliGRAM(s)/deciliter  glucagon  Injectable 1 milliGRAM(s) IntraMuscular once PRN Glucose LESS THAN 70 milligrams/deciliter  loperamide 2 milliGRAM(s) Oral daily PRN high ostomy output      No Known Allergies      No labs today.

## 2020-05-31 LAB
ANION GAP SERPL CALC-SCNC: 10 MMOL/L — SIGNIFICANT CHANGE UP (ref 5–17)
BUN SERPL-MCNC: 19 MG/DL — SIGNIFICANT CHANGE UP (ref 7–23)
CALCIUM SERPL-MCNC: 9.5 MG/DL — SIGNIFICANT CHANGE UP (ref 8.4–10.5)
CHLORIDE SERPL-SCNC: 107 MMOL/L — SIGNIFICANT CHANGE UP (ref 96–108)
CO2 SERPL-SCNC: 18 MMOL/L — LOW (ref 22–31)
CREAT SERPL-MCNC: 1.21 MG/DL — SIGNIFICANT CHANGE UP (ref 0.5–1.3)
GLUCOSE BLDC GLUCOMTR-MCNC: 157 MG/DL — HIGH (ref 70–99)
GLUCOSE BLDC GLUCOMTR-MCNC: 161 MG/DL — HIGH (ref 70–99)
GLUCOSE BLDC GLUCOMTR-MCNC: 185 MG/DL — HIGH (ref 70–99)
GLUCOSE SERPL-MCNC: 174 MG/DL — HIGH (ref 70–99)
POTASSIUM SERPL-MCNC: 5.2 MMOL/L — SIGNIFICANT CHANGE UP (ref 3.5–5.3)
POTASSIUM SERPL-SCNC: 5.2 MMOL/L — SIGNIFICANT CHANGE UP (ref 3.5–5.3)
SODIUM SERPL-SCNC: 135 MMOL/L — SIGNIFICANT CHANGE UP (ref 135–145)

## 2020-05-31 PROCEDURE — 99233 SBSQ HOSP IP/OBS HIGH 50: CPT | Mod: GC

## 2020-05-31 RX ADMIN — HEPARIN SODIUM 7500 UNIT(S): 5000 INJECTION INTRAVENOUS; SUBCUTANEOUS at 05:26

## 2020-05-31 RX ADMIN — BRIMONIDINE TARTRATE 1 DROP(S): 2 SOLUTION/ DROPS OPHTHALMIC at 05:27

## 2020-05-31 RX ADMIN — Medication 8 UNIT(S): at 17:05

## 2020-05-31 RX ADMIN — INSULIN GLARGINE 20 UNIT(S): 100 INJECTION, SOLUTION SUBCUTANEOUS at 22:19

## 2020-05-31 RX ADMIN — GABAPENTIN 300 MILLIGRAM(S): 400 CAPSULE ORAL at 05:26

## 2020-05-31 RX ADMIN — HEPARIN SODIUM 7500 UNIT(S): 5000 INJECTION INTRAVENOUS; SUBCUTANEOUS at 21:53

## 2020-05-31 RX ADMIN — Medication 1 APPLICATION(S): at 17:51

## 2020-05-31 RX ADMIN — DORZOLAMIDE HYDROCHLORIDE 1 DROP(S): 20 SOLUTION/ DROPS OPHTHALMIC at 05:28

## 2020-05-31 RX ADMIN — SODIUM CHLORIDE 1 GRAM(S): 9 INJECTION INTRAMUSCULAR; INTRAVENOUS; SUBCUTANEOUS at 05:25

## 2020-05-31 RX ADMIN — Medication 2.5 MILLIGRAM(S): at 17:46

## 2020-05-31 RX ADMIN — Medication 100 MILLIGRAM(S): at 21:54

## 2020-05-31 RX ADMIN — Medication 1 APPLICATION(S): at 05:29

## 2020-05-31 RX ADMIN — Medication 2.5 MILLIGRAM(S): at 05:26

## 2020-05-31 RX ADMIN — HEPARIN SODIUM 7500 UNIT(S): 5000 INJECTION INTRAVENOUS; SUBCUTANEOUS at 13:41

## 2020-05-31 RX ADMIN — PANTOPRAZOLE SODIUM 40 MILLIGRAM(S): 20 TABLET, DELAYED RELEASE ORAL at 17:05

## 2020-05-31 RX ADMIN — Medication 2: at 17:06

## 2020-05-31 RX ADMIN — Medication 650 MILLIGRAM(S): at 21:52

## 2020-05-31 RX ADMIN — Medication 2 MILLIGRAM(S): at 12:17

## 2020-05-31 RX ADMIN — GABAPENTIN 300 MILLIGRAM(S): 400 CAPSULE ORAL at 13:41

## 2020-05-31 RX ADMIN — Medication 2: at 12:16

## 2020-05-31 RX ADMIN — ATORVASTATIN CALCIUM 10 MILLIGRAM(S): 80 TABLET, FILM COATED ORAL at 21:54

## 2020-05-31 RX ADMIN — Medication 650 MILLIGRAM(S): at 13:41

## 2020-05-31 RX ADMIN — AMLODIPINE BESYLATE 10 MILLIGRAM(S): 2.5 TABLET ORAL at 05:25

## 2020-05-31 RX ADMIN — Medication 8 UNIT(S): at 12:16

## 2020-05-31 RX ADMIN — Medication 2: at 22:19

## 2020-05-31 RX ADMIN — BRIMONIDINE TARTRATE 1 DROP(S): 2 SOLUTION/ DROPS OPHTHALMIC at 22:18

## 2020-05-31 RX ADMIN — DORZOLAMIDE HYDROCHLORIDE 1 DROP(S): 20 SOLUTION/ DROPS OPHTHALMIC at 22:18

## 2020-05-31 RX ADMIN — SODIUM CHLORIDE 1 GRAM(S): 9 INJECTION INTRAMUSCULAR; INTRAVENOUS; SUBCUTANEOUS at 21:52

## 2020-05-31 RX ADMIN — DORZOLAMIDE HYDROCHLORIDE 1 DROP(S): 20 SOLUTION/ DROPS OPHTHALMIC at 13:42

## 2020-05-31 RX ADMIN — GABAPENTIN 300 MILLIGRAM(S): 400 CAPSULE ORAL at 21:54

## 2020-05-31 RX ADMIN — Medication 650 MILLIGRAM(S): at 05:26

## 2020-05-31 RX ADMIN — Medication 2: at 08:26

## 2020-05-31 RX ADMIN — BRIMONIDINE TARTRATE 1 DROP(S): 2 SOLUTION/ DROPS OPHTHALMIC at 13:42

## 2020-05-31 RX ADMIN — Medication 8 UNIT(S): at 08:27

## 2020-05-31 RX ADMIN — PANTOPRAZOLE SODIUM 40 MILLIGRAM(S): 20 TABLET, DELAYED RELEASE ORAL at 05:25

## 2020-05-31 RX ADMIN — SODIUM CHLORIDE 1 GRAM(S): 9 INJECTION INTRAMUSCULAR; INTRAVENOUS; SUBCUTANEOUS at 13:41

## 2020-05-31 NOTE — PROGRESS NOTE ADULT - SUBJECTIVE AND OBJECTIVE BOX
OVERNIGHT EVENTS:    SUBJECTIVE / INTERVAL HPI: Patient seen and examined at bedside. Moved rooms, has private room now and happy. No complaints medically.     VITAL SIGNS:  Vital Signs Last 24 Hrs  T(C): 36.3 (31 May 2020 06:08), Max: 36.7 (30 May 2020 20:50)  T(F): 97.4 (31 May 2020 06:08), Max: 98 (30 May 2020 20:50)  HR: 73 (31 May 2020 06:08) (73 - 98)  BP: 154/61 (31 May 2020 06:08) (131/70 - 154/61)  BP(mean): --  RR: 16 (31 May 2020 06:08) (16 - 18)  SpO2: 100% (31 May 2020 06:08) (96% - 100%)    PHYSICAL EXAM:    General: mildly disheveled  HEENT: NC/AT;   Neck: supple  Cardiovascular: +S1/S2; RRR  Respiratory: CTA b/l; no W/R/R  Gastrointestinal: ostomy site clean, scar healing well, no pus/drainage/tenderness  Extremities: WWP; no edema, clubbing or cyanosis      MEDICATIONS:  MEDICATIONS  (STANDING):  amLODIPine   Tablet 10 milliGRAM(s) Oral daily  AQUAPHOR (petrolatum Ointment) 1 Application(s) Topical two times a day  atorvastatin 10 milliGRAM(s) Oral at bedtime  baclofen 2.5 milliGRAM(s) Oral every 12 hours  brimonidine 0.2% Ophthalmic Solution 1 Drop(s) Both EYES three times a day  dextrose 5%. 1000 milliLiter(s) (50 mL/Hr) IV Continuous <Continuous>  dextrose 50% Injectable 12.5 Gram(s) IV Push once  dextrose 50% Injectable 25 Gram(s) IV Push once  dextrose 50% Injectable 25 Gram(s) IV Push once  dorzolamide 2% Ophthalmic Solution 1 Drop(s) Both EYES three times a day  gabapentin 300 milliGRAM(s) Oral every 8 hours  heparin   Injectable 7500 Unit(s) SubCutaneous every 8 hours  insulin glargine Injectable (LANTUS) 20 Unit(s) SubCutaneous at bedtime  insulin lispro (HumaLOG) corrective regimen sliding scale   SubCutaneous Before meals and at bedtime  insulin lispro Injectable (HumaLOG) 8 Unit(s) SubCutaneous three times a day before meals  loperamide 2 milliGRAM(s) Oral daily  pantoprazole    Tablet 40 milliGRAM(s) Oral every 12 hours  rifAXIMin 550 milliGRAM(s) Oral two times a day  sodium bicarbonate 650 milliGRAM(s) Oral three times a day  sodium chloride 1 Gram(s) Oral three times a day  traZODone 100 milliGRAM(s) Oral at bedtime    MEDICATIONS  (PRN):  acetaminophen   Tablet .. 500 milliGRAM(s) Oral every 6 hours PRN Mild Pain (1 - 3), Moderate Pain (4 - 6)  dextrose 40% Gel 15 Gram(s) Oral once PRN Blood Glucose LESS THAN 70 milliGRAM(s)/deciliter  glucagon  Injectable 1 milliGRAM(s) IntraMuscular once PRN Glucose LESS THAN 70 milligrams/deciliter  loperamide 2 milliGRAM(s) Oral daily PRN high ostomy output      ALLERGIES:  Allergies    No Known Allergies    Intolerances        LABS:    05-31    135  |  107  |  19  ----------------------------<  174<H>  5.2   |  18<L>  |  1.21    Ca    9.5      31 May 2020 10:34          CAPILLARY BLOOD GLUCOSE      POCT Blood Glucose.: 185 mg/dL (31 May 2020 08:10)      RADIOLOGY & ADDITIONAL TESTS: Reviewed.    ASSESSMENT:    PLAN:

## 2020-05-31 NOTE — PROGRESS NOTE ADULT - ASSESSMENT
60M PMH HTN, DM (w/ neuropathy), IVDU, ETOH liver cirrhosis who presented to [facility] s/p fall at home; was found with toxic megacolon and admitted to SICU; now s/p ex lap + subtotal colectomy, ileostomy (3/31). Hospital course complicated by COVID, MEG, and anemia with suspicion for GI source/varices (refused further GI workup). Now stable for discharge and pending establishment of proper ostomy care services. COVID negative on 5/23.

## 2020-05-31 NOTE — PROGRESS NOTE ADULT - PROBLEM SELECTOR PLAN 2
Cirrhosis c/b ascites likely 2/2 alcohol abuse. SBP ruled out, hepatic encephalopathy poa now resolved. Paracentesis on 4/10 with negative cytology. Abdominal U/S 5/3 small ascites   - continue Rifaximin 550 mg BID for hepatic encephalopathy  - holding Lasix + Spironolactone for ascites in s/o hyponatremia  - holding Lactulose in setting of high ostomy output  -compensated

## 2020-06-01 LAB
GLUCOSE BLDC GLUCOMTR-MCNC: 174 MG/DL — HIGH (ref 70–99)
GLUCOSE BLDC GLUCOMTR-MCNC: 182 MG/DL — HIGH (ref 70–99)
GLUCOSE BLDC GLUCOMTR-MCNC: 209 MG/DL — HIGH (ref 70–99)
GLUCOSE BLDC GLUCOMTR-MCNC: 221 MG/DL — HIGH (ref 70–99)

## 2020-06-01 PROCEDURE — 99231 SBSQ HOSP IP/OBS SF/LOW 25: CPT | Mod: GC

## 2020-06-01 PROCEDURE — 99232 SBSQ HOSP IP/OBS MODERATE 35: CPT

## 2020-06-01 PROCEDURE — 99232 SBSQ HOSP IP/OBS MODERATE 35: CPT | Mod: GC

## 2020-06-01 RX ORDER — INSULIN LISPRO 100/ML
10 VIAL (ML) SUBCUTANEOUS
Refills: 0 | Status: DISCONTINUED | OUTPATIENT
Start: 2020-06-01 | End: 2020-06-05

## 2020-06-01 RX ORDER — INSULIN GLARGINE 100 [IU]/ML
24 INJECTION, SOLUTION SUBCUTANEOUS AT BEDTIME
Refills: 0 | Status: DISCONTINUED | OUTPATIENT
Start: 2020-06-01 | End: 2020-06-05

## 2020-06-01 RX ORDER — SODIUM BICARBONATE 1 MEQ/ML
1300 SYRINGE (ML) INTRAVENOUS THREE TIMES A DAY
Refills: 0 | Status: DISCONTINUED | OUTPATIENT
Start: 2020-06-01 | End: 2020-06-05

## 2020-06-01 RX ADMIN — INSULIN GLARGINE 24 UNIT(S): 100 INJECTION, SOLUTION SUBCUTANEOUS at 21:50

## 2020-06-01 RX ADMIN — SODIUM CHLORIDE 1 GRAM(S): 9 INJECTION INTRAMUSCULAR; INTRAVENOUS; SUBCUTANEOUS at 21:56

## 2020-06-01 RX ADMIN — Medication 4: at 17:11

## 2020-06-01 RX ADMIN — Medication 2 MILLIGRAM(S): at 14:01

## 2020-06-01 RX ADMIN — Medication 8 UNIT(S): at 08:19

## 2020-06-01 RX ADMIN — ATORVASTATIN CALCIUM 10 MILLIGRAM(S): 80 TABLET, FILM COATED ORAL at 21:51

## 2020-06-01 RX ADMIN — SODIUM CHLORIDE 1 GRAM(S): 9 INJECTION INTRAMUSCULAR; INTRAVENOUS; SUBCUTANEOUS at 14:01

## 2020-06-01 RX ADMIN — Medication 1300 MILLIGRAM(S): at 14:00

## 2020-06-01 RX ADMIN — Medication 4: at 21:46

## 2020-06-01 RX ADMIN — Medication 100 MILLIGRAM(S): at 21:52

## 2020-06-01 RX ADMIN — HEPARIN SODIUM 7500 UNIT(S): 5000 INJECTION INTRAVENOUS; SUBCUTANEOUS at 21:53

## 2020-06-01 RX ADMIN — GABAPENTIN 300 MILLIGRAM(S): 400 CAPSULE ORAL at 21:52

## 2020-06-01 RX ADMIN — Medication 2: at 08:19

## 2020-06-01 RX ADMIN — BRIMONIDINE TARTRATE 1 DROP(S): 2 SOLUTION/ DROPS OPHTHALMIC at 06:30

## 2020-06-01 RX ADMIN — Medication 2: at 11:46

## 2020-06-01 RX ADMIN — Medication 8 UNIT(S): at 11:46

## 2020-06-01 RX ADMIN — Medication 10 UNIT(S): at 22:08

## 2020-06-01 RX ADMIN — Medication 650 MILLIGRAM(S): at 06:27

## 2020-06-01 RX ADMIN — SODIUM CHLORIDE 1 GRAM(S): 9 INJECTION INTRAMUSCULAR; INTRAVENOUS; SUBCUTANEOUS at 06:27

## 2020-06-01 RX ADMIN — BRIMONIDINE TARTRATE 1 DROP(S): 2 SOLUTION/ DROPS OPHTHALMIC at 22:06

## 2020-06-01 RX ADMIN — Medication 1 APPLICATION(S): at 06:30

## 2020-06-01 RX ADMIN — Medication 2.5 MILLIGRAM(S): at 17:13

## 2020-06-01 RX ADMIN — DORZOLAMIDE HYDROCHLORIDE 1 DROP(S): 20 SOLUTION/ DROPS OPHTHALMIC at 06:30

## 2020-06-01 RX ADMIN — PANTOPRAZOLE SODIUM 40 MILLIGRAM(S): 20 TABLET, DELAYED RELEASE ORAL at 06:27

## 2020-06-01 RX ADMIN — HEPARIN SODIUM 7500 UNIT(S): 5000 INJECTION INTRAVENOUS; SUBCUTANEOUS at 06:27

## 2020-06-01 RX ADMIN — Medication 1300 MILLIGRAM(S): at 21:52

## 2020-06-01 RX ADMIN — Medication 2.5 MILLIGRAM(S): at 06:26

## 2020-06-01 RX ADMIN — DORZOLAMIDE HYDROCHLORIDE 1 DROP(S): 20 SOLUTION/ DROPS OPHTHALMIC at 22:06

## 2020-06-01 RX ADMIN — DORZOLAMIDE HYDROCHLORIDE 1 DROP(S): 20 SOLUTION/ DROPS OPHTHALMIC at 14:03

## 2020-06-01 RX ADMIN — GABAPENTIN 300 MILLIGRAM(S): 400 CAPSULE ORAL at 14:01

## 2020-06-01 RX ADMIN — Medication 1 APPLICATION(S): at 17:15

## 2020-06-01 RX ADMIN — PANTOPRAZOLE SODIUM 40 MILLIGRAM(S): 20 TABLET, DELAYED RELEASE ORAL at 17:13

## 2020-06-01 RX ADMIN — AMLODIPINE BESYLATE 10 MILLIGRAM(S): 2.5 TABLET ORAL at 06:27

## 2020-06-01 RX ADMIN — GABAPENTIN 300 MILLIGRAM(S): 400 CAPSULE ORAL at 06:26

## 2020-06-01 RX ADMIN — BRIMONIDINE TARTRATE 1 DROP(S): 2 SOLUTION/ DROPS OPHTHALMIC at 14:03

## 2020-06-01 RX ADMIN — Medication 8 UNIT(S): at 17:11

## 2020-06-01 RX ADMIN — HEPARIN SODIUM 7500 UNIT(S): 5000 INJECTION INTRAVENOUS; SUBCUTANEOUS at 14:00

## 2020-06-01 NOTE — PROGRESS NOTE ADULT - SUBJECTIVE AND OBJECTIVE BOX
O/N Events: no acute events    Subjective: Patient seen at the bedside. Concerned about ostomy output - has had to change it several times.    VITALS  Vital Signs Last 24 Hrs  T(C): 36.5 (01 Jun 2020 06:01), Max: 37.1 (31 May 2020 21:20)  T(F): 97.7 (01 Jun 2020 06:01), Max: 98.8 (31 May 2020 21:20)  HR: 60 (01 Jun 2020 06:01) (60 - 75)  BP: 141/60 (01 Jun 2020 06:01) (137/74 - 155/69)  BP(mean): --  RR: 16 (01 Jun 2020 06:01) (16 - 18)  SpO2: 99% (01 Jun 2020 06:01) (99% - 100%)    CAPILLARY BLOOD GLUCOSE      POCT Blood Glucose.: 182 mg/dL (01 Jun 2020 08:02)  POCT Blood Glucose.: 161 mg/dL (31 May 2020 16:48)  POCT Blood Glucose.: 157 mg/dL (31 May 2020 11:33)      PHYSICAL EXAM  General appearance: sitting in chair next to bed, awake and alert  HEENT: normocephalic/atraumatic  Respiratory: breath sounds clear to auscultation throughout all lung fields, no accessory muscle use, no wheezing  Cardiovascular: regular rate and rhythm  Gastrointestinal: soft, non-tender, moderately distended, normoactive bowel sounds, midline healing incision present, no purulence or drainage, ostomy putting out very loose brown stool  Extremities: WWP, no lower extremity edema  Neurological: alert and oriented    MEDICATIONS  (STANDING):  amLODIPine   Tablet 10 milliGRAM(s) Oral daily  AQUAPHOR (petrolatum Ointment) 1 Application(s) Topical two times a day  atorvastatin 10 milliGRAM(s) Oral at bedtime  baclofen 2.5 milliGRAM(s) Oral every 12 hours  brimonidine 0.2% Ophthalmic Solution 1 Drop(s) Both EYES three times a day  dextrose 5%. 1000 milliLiter(s) (50 mL/Hr) IV Continuous <Continuous>  dextrose 50% Injectable 12.5 Gram(s) IV Push once  dextrose 50% Injectable 25 Gram(s) IV Push once  dextrose 50% Injectable 25 Gram(s) IV Push once  dorzolamide 2% Ophthalmic Solution 1 Drop(s) Both EYES three times a day  gabapentin 300 milliGRAM(s) Oral every 8 hours  heparin   Injectable 7500 Unit(s) SubCutaneous every 8 hours  insulin glargine Injectable (LANTUS) 20 Unit(s) SubCutaneous at bedtime  insulin lispro (HumaLOG) corrective regimen sliding scale   SubCutaneous Before meals and at bedtime  insulin lispro Injectable (HumaLOG) 8 Unit(s) SubCutaneous three times a day before meals  loperamide 2 milliGRAM(s) Oral daily  pantoprazole    Tablet 40 milliGRAM(s) Oral every 12 hours  rifAXIMin 550 milliGRAM(s) Oral two times a day  sodium bicarbonate 1300 milliGRAM(s) Oral three times a day  sodium chloride 1 Gram(s) Oral three times a day  traZODone 100 milliGRAM(s) Oral at bedtime    MEDICATIONS  (PRN):  acetaminophen   Tablet .. 500 milliGRAM(s) Oral every 6 hours PRN Mild Pain (1 - 3), Moderate Pain (4 - 6)  dextrose 40% Gel 15 Gram(s) Oral once PRN Blood Glucose LESS THAN 70 milliGRAM(s)/deciliter  glucagon  Injectable 1 milliGRAM(s) IntraMuscular once PRN Glucose LESS THAN 70 milligrams/deciliter  loperamide 2 milliGRAM(s) Oral daily PRN high ostomy output      No Known Allergies      LABS    05-31    135  |  107  |  19  ----------------------------<  174<H>  5.2   |  18<L>  |  1.21    Ca    9.5      31 May 2020 10:34                PROCEDURES/IMAGING: reviewed.

## 2020-06-01 NOTE — PROGRESS NOTE ADULT - PROBLEM SELECTOR PLAN 4
RESOLVED. Baseline Cr 1.5-2, currently stable around 1.3.  - monitor I/Os   - renal following - appreciate recs  - avoid nephrotoxic agents    #Non-anion gap metabolic acidosis   - increased sodium bicarbonate to 1300 mg TID  - f/u renal recs

## 2020-06-01 NOTE — PROGRESS NOTE ADULT - ATTENDING COMMENTS
Glucoses have been quite stable but remain moderately elevated to the 160-185 range.  Will increase both the Lantus (to 24 units) and premeal (to 10 units) doses

## 2020-06-01 NOTE — PROGRESS NOTE ADULT - SUBJECTIVE AND OBJECTIVE BOX
Patient is a 60y Male seen and evaluated at bedside.   pt discussed with team   labs from 05/31 noted    acetaminophen   Tablet .. 500 every 6 hours PRN  amLODIPine   Tablet 10 daily  AQUAPHOR (petrolatum Ointment) 1 two times a day  atorvastatin 10 at bedtime  baclofen 2.5 every 12 hours  brimonidine 0.2% Ophthalmic Solution 1 three times a day  dextrose 40% Gel 15 once PRN  dextrose 5%. 1000 <Continuous>  dextrose 50% Injectable 12.5 once  dextrose 50% Injectable 25 once  dextrose 50% Injectable 25 once  dorzolamide 2% Ophthalmic Solution 1 three times a day  gabapentin 300 every 8 hours  glucagon  Injectable 1 once PRN  heparin   Injectable 7500 every 8 hours  insulin glargine Injectable (LANTUS) 20 at bedtime  insulin lispro (HumaLOG) corrective regimen sliding scale  Before meals and at bedtime  insulin lispro Injectable (HumaLOG) 8 three times a day before meals  loperamide 2 daily PRN  loperamide 2 daily  pantoprazole    Tablet 40 every 12 hours  rifAXIMin 550 two times a day  sodium bicarbonate 650 three times a day  sodium chloride 1 three times a day  traZODone 100 at bedtime      Allergies    No Known Allergies    Intolerances        T(C): , Max: 37.1 (05-31-20 @ 21:20)  T(F): , Max: 98.8 (05-31-20 @ 21:20)  HR: 60 (06-01-20 @ 06:01)  BP: 141/60 (06-01-20 @ 06:01)  BP(mean): --  RR: 16 (06-01-20 @ 06:01)  SpO2: 99% (06-01-20 @ 06:01)  Wt(kg): --    05-31 @ 07:01  -  06-01 @ 07:00  --------------------------------------------------------  IN: 0 mL / OUT: 400 mL / NET: -400 mL          Review of Systems:  deferred due to covid 19      PHYSICAL EXAM:  deferred due to covid 19            LABS:    05-31    135  |  107  |  19  ----------------------------<  174<H>  5.2   |  18<L>  |  1.21    Ca    9.5      31 May 2020 10:34                  RADIOLOGY & ADDITIONAL STUDIES:

## 2020-06-01 NOTE — PROGRESS NOTE ADULT - PROBLEM SELECTOR PLAN 5
Normocytic, likely 2/2 chronic disease + kidney dysfunction. Hemoglobin stable around 8-9. S/p 7u pRBCs total this admission. Initial concern for GI source, however patient refused further workup and GI states capsule endoscopy no longer indicated; ostomy output has remained non-bloody.  - goal hemoglobin >7  - pantoprazole 40 mg PO BID    #Hyponatremia  RESOLVED. Hypovolemic hyponatremia.  - salt tabs 1 g TID  - fluid restriction  - holding diuretics  - f/u renal recs

## 2020-06-01 NOTE — PROGRESS NOTE ADULT - PROBLEM SELECTOR PLAN 7
Type 2 DM. A1C 6.9% on 4/5.  - continue Lantus to 20 units in the evening  - continue Lispro 8 units TID pre-meal   - continue mISS  - discontinued metformin  - f/u endo recs    #Hypothyroidism   TSH 2.813; TPO and antithyroid Abs neg. Levothyroxine 50 mcg discontinued per endocrinology 4/29 given low concern for autoimmune hypothyroidism at this time; will assess necessity of continuation of levothyroxine.  - f/u endo recs

## 2020-06-01 NOTE — PROGRESS NOTE ADULT - NSREFPHYEXREFTO_GEN_ALL_CORE
Inpatient Physical Exam
ED Physical Exam
Inpatient Physical Exam

## 2020-06-01 NOTE — PROGRESS NOTE ADULT - ASSESSMENT
A/p  Patient with Hx IVDU, ETOH liver cirrhosis admitted for toxic megacolon, pneumatosis and SARS-CoV-2 infection, underwent ex lap and subtotal colectomy with ileostomy creation, nephrology initially consulted for non oliguric DESIREE attributed to ATN which has improved since      Desiree  resolved  electrolytes noted- on 05/31-. K @ 5.2, na @ 135, bicarbonate @ 18  rule out urine retention-- bladder scan  intermittently  low K diet  continue monitoring BMP    acidosis   ileostomy output noted--> not that significant   likely 2nd to CKD  recommend increasing sodium bicarbonate to 1300 mg po tid    HTN  continue with amlodipine 10 mg po daily A/p  Patient with Hx IVDU, ETOH liver cirrhosis admitted for toxic megacolon, pneumatosis and SARS-CoV-2 infection, underwent ex lap and subtotal colectomy with ileostomy creation, nephrology initially consulted for non oliguric DESIREE attributed to ATN which has improved since      Desiree  resolved  electrolytes noted- on 05/31-. K @ 5.2, na @ 135, bicarbonate @ 18  rule out urine retention-- bladder scan  intermittently  low K diet  continue monitoring BMP    hyponatremia  resolved  continue with naCl 1 g q 8hrs  recheck urine na, and urine osm     acidosis   ileostomy output noted--> not that significant   likely 2nd to CKD  recommend increasing sodium bicarbonate to 1300 mg po tid    HTN  continue with amlodipine 10 mg po daily

## 2020-06-01 NOTE — PROGRESS NOTE ADULT - SUBJECTIVE AND OBJECTIVE BOX
INTERVAL HPI/OVERNIGHT EVENTS:    Patient is a 60y old  Male who presents with a chief complaint of COVID (25 May 2020 08:33)      Pt reports the following symptoms:    CONSTITUTIONAL:  Negative fever or chills, feels well, good appetite  EYES:  Negative  blurry vision or double vision  CARDIOVASCULAR:  Negative for chest pain or palpitations  RESPIRATORY:  Negative for cough, wheezing, or SOB   GASTROINTESTINAL:  Negative for nausea, vomiting, diarrhea, constipation, or abdominal pain  GENITOURINARY:  Negative frequency, urgency or dysuria  NEUROLOGIC:  No headache, confusion, dizziness, lightheadedness    MEDICATIONS  (STANDING):  amLODIPine   Tablet 10 milliGRAM(s) Oral daily  AQUAPHOR (petrolatum Ointment) 1 Application(s) Topical two times a day  atorvastatin 10 milliGRAM(s) Oral at bedtime  baclofen 2.5 milliGRAM(s) Oral every 12 hours  brimonidine 0.2% Ophthalmic Solution 1 Drop(s) Both EYES three times a day  dextrose 5%. 1000 milliLiter(s) (50 mL/Hr) IV Continuous <Continuous>  dextrose 50% Injectable 12.5 Gram(s) IV Push once  dextrose 50% Injectable 25 Gram(s) IV Push once  dextrose 50% Injectable 25 Gram(s) IV Push once  dorzolamide 2% Ophthalmic Solution 1 Drop(s) Both EYES three times a day  gabapentin 300 milliGRAM(s) Oral every 8 hours  heparin   Injectable 7500 Unit(s) SubCutaneous every 8 hours  insulin glargine Injectable (LANTUS) 20 Unit(s) SubCutaneous at bedtime  insulin lispro (HumaLOG) corrective regimen sliding scale   SubCutaneous Before meals and at bedtime  insulin lispro Injectable (HumaLOG) 8 Unit(s) SubCutaneous three times a day before meals  loperamide 2 milliGRAM(s) Oral daily  pantoprazole    Tablet 40 milliGRAM(s) Oral every 12 hours  rifAXIMin 550 milliGRAM(s) Oral two times a day  sodium bicarbonate 1300 milliGRAM(s) Oral three times a day  sodium chloride 1 Gram(s) Oral three times a day  traZODone 100 milliGRAM(s) Oral at bedtime    MEDICATIONS  (PRN):  acetaminophen   Tablet .. 500 milliGRAM(s) Oral every 6 hours PRN Mild Pain (1 - 3), Moderate Pain (4 - 6)  dextrose 40% Gel 15 Gram(s) Oral once PRN Blood Glucose LESS THAN 70 milliGRAM(s)/deciliter  glucagon  Injectable 1 milliGRAM(s) IntraMuscular once PRN Glucose LESS THAN 70 milligrams/deciliter  loperamide 2 milliGRAM(s) Oral daily PRN high ostomy output      PHYSICAL EXAM  Vital Signs Last 24 Hrs  T(C): 36.5 (01 Jun 2020 06:01), Max: 37.1 (31 May 2020 21:20)  T(F): 97.7 (01 Jun 2020 06:01), Max: 98.8 (31 May 2020 21:20)  HR: 60 (01 Jun 2020 06:01) (60 - 75)  BP: 141/60 (01 Jun 2020 06:01) (137/74 - 155/69)  BP(mean): --  RR: 16 (01 Jun 2020 06:01) (16 - 18)  SpO2: 99% (01 Jun 2020 06:01) (99% - 100%)    Constitutional: wn/wd in NAD.   HEENT: NCAT, MMM, OP clear, EOMI, no proptosis or lid retraction  Neck: no thyromegaly or palpable thyroid nodules   Respiratory: lungs CTAB.  Cardiovascular: regular rhythm, normal S1 and S2, no audible murmurs, no peripheral edema  GI: soft, NT/ND, no masses/HSM appreciated.  Neurology: no tremors, DTR 2+  Skin: no visible rashes/lesions  Psychiatric: AAO x 3, normal affect/mood.    LABS:    05-31    135  |  107  |  19  ----------------------------<  174<H>  5.2   |  18<L>  |  1.21    Ca    9.5      31 May 2020 10:34          Thyroid Stimulating Hormone, Serum: 2.613 uIU/mL (05-26 @ 10:28)  Thyroid Stimulating Hormone, Serum: 2.389 uIU/mL (05-18 @ 07:38)  Thyroid Stimulating Hormone, Serum: 3.869 uIU/mL (05-13 @ 06:50)  Thyroid Stimulating Hormone, Serum: 3.104 uIU/mL (05-11 @ 06:10)  Thyroid Stimulating Hormone, Serum: 2.712 uIU/mL (05-07 @ 06:31)  Thyroid Stimulating Hormone, Serum: 3.063 uIU/mL (05-07 @ 06:31)  Thyroid Stimulating Hormone, Serum: 3.509 uIU/mL (05-03 @ 06:07)  Thyroid Stimulating Hormone, Serum: 3.325 uIU/mL (04-29 @ 06:17)  Thyroid Stimulating Hormone, Serum: 2.813 uIU/mL (04-14 @ 07:28)  Thyroid Stimulating Hormone, Serum: 2.877 uIU/mL (04-14 @ 00:34)      HbA1C: 6.9 % (04-05 @ 06:59)  7.6 % (03-31 @ 05:58)    CAPILLARY BLOOD GLUCOSE      POCT Blood Glucose.: 174 mg/dL (01 Jun 2020 11:33)  POCT Blood Glucose.: 182 mg/dL (01 Jun 2020 08:02)  POCT Blood Glucose.: 161 mg/dL (31 May 2020 16:48)      Insulin Sliding Scale requirements X 24 Hours:    RADIOLOGY & ADDITIONAL TESTS:    A/P: 60y Male with history of DM type II presenting for       1.  DM -     Please continue           units lantus at bedtime  / in the morning and        units lispro with meals and lispro moderate / low dose sliding scale 4 times daily with meals and at bedtime.  Please continue consistent carbohydrate diet.      Goal FSG is   Will continue to monitor   For discharge, pt can continue    Pt can follow up at discharge with [facility] by calling  to make an appointment.   Will discuss case with     and update primary team INTERVAL HPI/OVERNIGHT EVENTS:    Patient is a 60y old  Male who presents with a chief complaint of COVID (25 May 2020 08:33)  Was unable to talk to the patient.  SPoke to the primary team and the nurse taking care of the patient  Saturating well on RA. Appetite is good.  He was having some issues taking care of the ostomy bag   Dispo pending rehab placement  Sodium was 135 yesterday. On salt tabs 1gm TID    FSG & Insulin received:  Yesterday:  pre-dinner fs, 8 + 2 units lispro   bedtime fs, 20 lantus   units + 2 units lispro SS  Today:  pre-breakfast fs, 8 + 2 units lispro   pre-lunch fs    As per the primary team, Pt reports the following symptoms:  CONSTITUTIONAL:  Negative fever or chills  CARDIOVASCULAR:  Negative for chest pain or palpitations  RESPIRATORY:  Negative for cough, wheezing, or SOB   GASTROINTESTINAL:  Negative for vomiting, diarrhea, constipation, or abdominal pain  NEUROLOGIC:  No headache, confusion, dizziness, lightheadedness      MEDICATIONS  (STANDING):  amLODIPine   Tablet 10 milliGRAM(s) Oral daily  AQUAPHOR (petrolatum Ointment) 1 Application(s) Topical two times a day  atorvastatin 10 milliGRAM(s) Oral at bedtime  baclofen 2.5 milliGRAM(s) Oral every 12 hours  brimonidine 0.2% Ophthalmic Solution 1 Drop(s) Both EYES three times a day  dextrose 5%. 1000 milliLiter(s) (50 mL/Hr) IV Continuous <Continuous>  dextrose 50% Injectable 12.5 Gram(s) IV Push once  dextrose 50% Injectable 25 Gram(s) IV Push once  dextrose 50% Injectable 25 Gram(s) IV Push once  dorzolamide 2% Ophthalmic Solution 1 Drop(s) Both EYES three times a day  gabapentin 300 milliGRAM(s) Oral every 8 hours  heparin   Injectable 7500 Unit(s) SubCutaneous every 8 hours  insulin glargine Injectable (LANTUS) 20 Unit(s) SubCutaneous at bedtime  insulin lispro (HumaLOG) corrective regimen sliding scale   SubCutaneous Before meals and at bedtime  insulin lispro Injectable (HumaLOG) 8 Unit(s) SubCutaneous three times a day before meals  loperamide 2 milliGRAM(s) Oral daily  pantoprazole    Tablet 40 milliGRAM(s) Oral every 12 hours  rifAXIMin 550 milliGRAM(s) Oral two times a day  sodium bicarbonate 1300 milliGRAM(s) Oral three times a day  sodium chloride 1 Gram(s) Oral three times a day  traZODone 100 milliGRAM(s) Oral at bedtime    MEDICATIONS  (PRN):  acetaminophen   Tablet .. 500 milliGRAM(s) Oral every 6 hours PRN Mild Pain (1 - 3), Moderate Pain (4 - 6)  dextrose 40% Gel 15 Gram(s) Oral once PRN Blood Glucose LESS THAN 70 milliGRAM(s)/deciliter  glucagon  Injectable 1 milliGRAM(s) IntraMuscular once PRN Glucose LESS THAN 70 milligrams/deciliter  loperamide 2 milliGRAM(s) Oral daily PRN high ostomy output      PHYSICAL EXAM  Vital Signs Last 24 Hrs  T(C): 36.5 (2020 06:01), Max: 37.1 (31 May 2020 21:20)  T(F): 97.7 (2020 06:01), Max: 98.8 (31 May 2020 21:20)  HR: 60 (2020 06:01) (60 - 75)  BP: 141/60 (2020 06:01) (137/74 - 155/69)  BP(mean): --  RR: 16 (2020 06:01) (16 - 18)  SpO2: 99% (2020 06:01) (99% - 100%)      Due to the nature of this patient’s COVID-19 isolation status (either confirmed or suspected), this note was prepared without a bedside physical examination to prevent spread of infection and to conserve personal protective equipment during this nationwide pandemic. If possible, direct patient communication occurred via electronic measures or telephone conversation. Examination highlights were provided by a bedside nurse wearing personal protective equipment and review of pertinent medical records. Objective data (vital signs, urine output, lab results, imaging studies, medications, etc) were reviewed in detail. Face to face visits and physical examination have been limited only to patients for whom it is required for medical decision making.    LABS:        135  |  107  |  19  ----------------------------<  174<H>  5.2   |  18<L>  |  1.21    Ca    9.5      31 May 2020 10:34          Thyroid Stimulating Hormone, Serum: 2.613 uIU/mL ( @ 10:28)  Thyroid Stimulating Hormone, Serum: 2.389 uIU/mL ( @ 07:38)  Thyroid Stimulating Hormone, Serum: 3.869 uIU/mL ( @ 06:50)  Thyroid Stimulating Hormone, Serum: 3.104 uIU/mL ( @ 06:10)  Thyroid Stimulating Hormone, Serum: 2.712 uIU/mL ( @ 06:31)  Thyroid Stimulating Hormone, Serum: 3.063 uIU/mL ( @ 06:31)  Thyroid Stimulating Hormone, Serum: 3.509 uIU/mL ( @ 06:07)  Thyroid Stimulating Hormone, Serum: 3.325 uIU/mL ( @ 06:17)  Thyroid Stimulating Hormone, Serum: 2.813 uIU/mL ( @ 07:28)  Thyroid Stimulating Hormone, Serum: 2.877 uIU/mL ( @ 00:34)      HbA1C: 6.9 % ( @ 06:59)  7.6 % ( @ 05:58)    CAPILLARY BLOOD GLUCOSE      POCT Blood Glucose.: 174 mg/dL (2020 11:33)  POCT Blood Glucose.: 182 mg/dL (2020 08:02)  POCT Blood Glucose.: 161 mg/dL (31 May 2020 16:48)      Insulin Sliding Scale requirements X 24 Hours:    RADIOLOGY & ADDITIONAL TESTS:    A/P 60yMale with hx of DM now with COVID, worsening renal function, moderate hyperglycemia.     1.  DM: type 2  Please increase to Lantus 24 units at bedtime  Please increase to Lispro 10  units tid with meals.   Continue lispro moderate dose scale with meals and at bedtime.   Continue consistent carb diet  FSG Goal 100-180  GFR 40 and EF 65%    2.  Hyperlipidemia - goal LDL < 70  - on lipitor 10 mg once daily    3.  Hypothyroidism   - TSH on admission 2.8.   - levothyroxine 50mcg discontinued on  to assess necessity as there is no evidence of autoimmune hypothyroidism at this time.   - Repeat TSH 3.32 and free T4 1.03 ().   - TSH 3.5 and Free T4 1.01 on 5/3   TSH was 2.613 and Free T4 0.84 on   - repeat TFTs on       For discharge, TBD    Case seen and discussed with  and updated the primary team.  Pt can follow up at discharge with [facility] by calling  to make an appointment.

## 2020-06-01 NOTE — PROGRESS NOTE ADULT - ATTENDING COMMENTS
Desiree- cret stabilized at 1.2  Na up to 135-  good= c/w salt tabs  still with metabolic acidosis and hyperkalemia  CO2 of 18 acceptable but if dips lower may need to change NaCl tabs to NaHCO3 tabs

## 2020-06-01 NOTE — PROGRESS NOTE ADULT - ASSESSMENT
60M PMH HTN, DM (w/ neuropathy), IVDU, ETOH liver cirrhosis who presented to [facility] s/p fall at home; was found with toxic megacolon and admitted to SICU; now s/p ex lap + subtotal colectomy, ileostomy (3/31). Hospital course complicated by COVID, MEG, and anemia with suspicion for GI source/varices (refused further GI workup). Now stable for discharge and pending establishment of proper ostomy care services. COVID negative on 5/23.

## 2020-06-01 NOTE — PROGRESS NOTE ADULT - NSREFPHYEXINPTDOCREFER_GEN_ALL_CORE
med team
primary team
primary team
med team
medicine progress note
Med
med team
med team
Med
Primary team progress note
med team

## 2020-06-01 NOTE — PROGRESS NOTE ADULT - PROBLEM SELECTOR PLAN 2
Cirrhosis c/b ascites likely 2/2 alcohol abuse. SBP ruled out, hepatic encephalopathy poa now resolved. Paracentesis on 4/10 with negative cytology. Abdominal U/S 5/3 small ascites.   - continue Rifaximin 550 mg BID for hepatic encephalopathy  - holding Lasix + Spironolactone  - holding Lactulose in setting of high ostomy output  - maximum Tylenol 2 g per day

## 2020-06-02 LAB
GLUCOSE BLDC GLUCOMTR-MCNC: 110 MG/DL — HIGH (ref 70–99)
GLUCOSE BLDC GLUCOMTR-MCNC: 118 MG/DL — HIGH (ref 70–99)
GLUCOSE BLDC GLUCOMTR-MCNC: 174 MG/DL — HIGH (ref 70–99)
GLUCOSE BLDC GLUCOMTR-MCNC: 178 MG/DL — HIGH (ref 70–99)

## 2020-06-02 PROCEDURE — 99231 SBSQ HOSP IP/OBS SF/LOW 25: CPT | Mod: GC

## 2020-06-02 PROCEDURE — 99233 SBSQ HOSP IP/OBS HIGH 50: CPT | Mod: GC

## 2020-06-02 RX ADMIN — GABAPENTIN 300 MILLIGRAM(S): 400 CAPSULE ORAL at 14:19

## 2020-06-02 RX ADMIN — BRIMONIDINE TARTRATE 1 DROP(S): 2 SOLUTION/ DROPS OPHTHALMIC at 13:48

## 2020-06-02 RX ADMIN — PANTOPRAZOLE SODIUM 40 MILLIGRAM(S): 20 TABLET, DELAYED RELEASE ORAL at 16:38

## 2020-06-02 RX ADMIN — SODIUM CHLORIDE 1 GRAM(S): 9 INJECTION INTRAMUSCULAR; INTRAVENOUS; SUBCUTANEOUS at 21:51

## 2020-06-02 RX ADMIN — Medication 1 APPLICATION(S): at 17:56

## 2020-06-02 RX ADMIN — Medication 100 MILLIGRAM(S): at 21:51

## 2020-06-02 RX ADMIN — SODIUM CHLORIDE 1 GRAM(S): 9 INJECTION INTRAMUSCULAR; INTRAVENOUS; SUBCUTANEOUS at 14:20

## 2020-06-02 RX ADMIN — Medication 10 UNIT(S): at 16:38

## 2020-06-02 RX ADMIN — Medication 2.5 MILLIGRAM(S): at 05:28

## 2020-06-02 RX ADMIN — Medication 2: at 16:37

## 2020-06-02 RX ADMIN — Medication 2: at 21:45

## 2020-06-02 RX ADMIN — Medication 2.5 MILLIGRAM(S): at 17:58

## 2020-06-02 RX ADMIN — GABAPENTIN 300 MILLIGRAM(S): 400 CAPSULE ORAL at 05:28

## 2020-06-02 RX ADMIN — Medication 10 UNIT(S): at 08:52

## 2020-06-02 RX ADMIN — DORZOLAMIDE HYDROCHLORIDE 1 DROP(S): 20 SOLUTION/ DROPS OPHTHALMIC at 21:53

## 2020-06-02 RX ADMIN — HEPARIN SODIUM 7500 UNIT(S): 5000 INJECTION INTRAVENOUS; SUBCUTANEOUS at 21:51

## 2020-06-02 RX ADMIN — Medication 1300 MILLIGRAM(S): at 21:52

## 2020-06-02 RX ADMIN — DORZOLAMIDE HYDROCHLORIDE 1 DROP(S): 20 SOLUTION/ DROPS OPHTHALMIC at 13:48

## 2020-06-02 RX ADMIN — SODIUM CHLORIDE 1 GRAM(S): 9 INJECTION INTRAMUSCULAR; INTRAVENOUS; SUBCUTANEOUS at 05:28

## 2020-06-02 RX ADMIN — GABAPENTIN 300 MILLIGRAM(S): 400 CAPSULE ORAL at 21:51

## 2020-06-02 RX ADMIN — DORZOLAMIDE HYDROCHLORIDE 1 DROP(S): 20 SOLUTION/ DROPS OPHTHALMIC at 05:30

## 2020-06-02 RX ADMIN — HEPARIN SODIUM 7500 UNIT(S): 5000 INJECTION INTRAVENOUS; SUBCUTANEOUS at 14:20

## 2020-06-02 RX ADMIN — PANTOPRAZOLE SODIUM 40 MILLIGRAM(S): 20 TABLET, DELAYED RELEASE ORAL at 05:28

## 2020-06-02 RX ADMIN — HEPARIN SODIUM 7500 UNIT(S): 5000 INJECTION INTRAVENOUS; SUBCUTANEOUS at 05:28

## 2020-06-02 RX ADMIN — INSULIN GLARGINE 24 UNIT(S): 100 INJECTION, SOLUTION SUBCUTANEOUS at 21:50

## 2020-06-02 RX ADMIN — ATORVASTATIN CALCIUM 10 MILLIGRAM(S): 80 TABLET, FILM COATED ORAL at 21:51

## 2020-06-02 RX ADMIN — Medication 1 APPLICATION(S): at 05:30

## 2020-06-02 RX ADMIN — BRIMONIDINE TARTRATE 1 DROP(S): 2 SOLUTION/ DROPS OPHTHALMIC at 21:53

## 2020-06-02 RX ADMIN — AMLODIPINE BESYLATE 10 MILLIGRAM(S): 2.5 TABLET ORAL at 05:27

## 2020-06-02 RX ADMIN — Medication 10 UNIT(S): at 11:50

## 2020-06-02 RX ADMIN — BRIMONIDINE TARTRATE 1 DROP(S): 2 SOLUTION/ DROPS OPHTHALMIC at 05:30

## 2020-06-02 RX ADMIN — Medication 1300 MILLIGRAM(S): at 05:34

## 2020-06-02 RX ADMIN — Medication 2 MILLIGRAM(S): at 11:50

## 2020-06-02 RX ADMIN — Medication 1300 MILLIGRAM(S): at 14:20

## 2020-06-02 NOTE — PROGRESS NOTE ADULT - ATTENDING COMMENTS
Glucoses improved to the 110-120 range today after increased insulin regimen was implemented.  Expect that insulin requirements will nonetheless increase further because he is now asking for Glucerna at every meal, even if he has eaten a full tray.  To continue the current insulin regimen for now

## 2020-06-02 NOTE — PROGRESS NOTE ADULT - SUBJECTIVE AND OBJECTIVE BOX
INTERVAL HPI/OVERNIGHT EVENTS:    Patient is a 60y old  Male who presents with a chief complaint of COVID (25 May 2020 08:33)      Pt reports the following symptoms:    CONSTITUTIONAL:  Negative fever or chills, feels well, good appetite  EYES:  Negative  blurry vision or double vision  CARDIOVASCULAR:  Negative for chest pain or palpitations  RESPIRATORY:  Negative for cough, wheezing, or SOB   GASTROINTESTINAL:  Negative for nausea, vomiting, diarrhea, constipation, or abdominal pain  GENITOURINARY:  Negative frequency, urgency or dysuria  NEUROLOGIC:  No headache, confusion, dizziness, lightheadedness    MEDICATIONS  (STANDING):  amLODIPine   Tablet 10 milliGRAM(s) Oral daily  AQUAPHOR (petrolatum Ointment) 1 Application(s) Topical two times a day  atorvastatin 10 milliGRAM(s) Oral at bedtime  baclofen 2.5 milliGRAM(s) Oral every 12 hours  brimonidine 0.2% Ophthalmic Solution 1 Drop(s) Both EYES three times a day  dextrose 5%. 1000 milliLiter(s) (50 mL/Hr) IV Continuous <Continuous>  dextrose 50% Injectable 12.5 Gram(s) IV Push once  dextrose 50% Injectable 25 Gram(s) IV Push once  dextrose 50% Injectable 25 Gram(s) IV Push once  dorzolamide 2% Ophthalmic Solution 1 Drop(s) Both EYES three times a day  gabapentin 300 milliGRAM(s) Oral every 8 hours  heparin   Injectable 7500 Unit(s) SubCutaneous every 8 hours  insulin glargine Injectable (LANTUS) 24 Unit(s) SubCutaneous at bedtime  insulin lispro (HumaLOG) corrective regimen sliding scale   SubCutaneous Before meals and at bedtime  insulin lispro Injectable (HumaLOG) 10 Unit(s) SubCutaneous three times a day before meals  loperamide 2 milliGRAM(s) Oral daily  pantoprazole    Tablet 40 milliGRAM(s) Oral every 12 hours  rifAXIMin 550 milliGRAM(s) Oral two times a day  sodium bicarbonate 1300 milliGRAM(s) Oral three times a day  sodium chloride 1 Gram(s) Oral three times a day  traZODone 100 milliGRAM(s) Oral at bedtime    MEDICATIONS  (PRN):  acetaminophen   Tablet .. 500 milliGRAM(s) Oral every 6 hours PRN Mild Pain (1 - 3), Moderate Pain (4 - 6)  dextrose 40% Gel 15 Gram(s) Oral once PRN Blood Glucose LESS THAN 70 milliGRAM(s)/deciliter  glucagon  Injectable 1 milliGRAM(s) IntraMuscular once PRN Glucose LESS THAN 70 milligrams/deciliter  loperamide 2 milliGRAM(s) Oral daily PRN high ostomy output      PHYSICAL EXAM  Vital Signs Last 24 Hrs  T(C): 36.5 (02 Jun 2020 05:22), Max: 36.8 (01 Jun 2020 14:00)  T(F): 97.7 (02 Jun 2020 05:22), Max: 98.2 (01 Jun 2020 14:00)  HR: 58 (02 Jun 2020 05:22) (58 - 70)  BP: 128/53 (02 Jun 2020 05:22) (121/56 - 145/74)  BP(mean): --  RR: 18 (02 Jun 2020 05:22) (18 - 21)  SpO2: 99% (02 Jun 2020 05:22) (99% - 99%)    Constitutional: wn/wd in NAD.   HEENT: NCAT, MMM, OP clear, EOMI, no proptosis or lid retraction  Neck: no thyromegaly or palpable thyroid nodules   Respiratory: lungs CTAB.  Cardiovascular: regular rhythm, normal S1 and S2, no audible murmurs, no peripheral edema  GI: soft, NT/ND, no masses/HSM appreciated.  Neurology: no tremors, DTR 2+  Skin: no visible rashes/lesions  Psychiatric: AAO x 3, normal affect/mood.    LABS:              Thyroid Stimulating Hormone, Serum: 2.613 uIU/mL (05-26 @ 10:28)  Thyroid Stimulating Hormone, Serum: 2.389 uIU/mL (05-18 @ 07:38)  Thyroid Stimulating Hormone, Serum: 3.869 uIU/mL (05-13 @ 06:50)  Thyroid Stimulating Hormone, Serum: 3.104 uIU/mL (05-11 @ 06:10)  Thyroid Stimulating Hormone, Serum: 2.712 uIU/mL (05-07 @ 06:31)  Thyroid Stimulating Hormone, Serum: 3.063 uIU/mL (05-07 @ 06:31)  Thyroid Stimulating Hormone, Serum: 3.509 uIU/mL (05-03 @ 06:07)  Thyroid Stimulating Hormone, Serum: 3.325 uIU/mL (04-29 @ 06:17)  Thyroid Stimulating Hormone, Serum: 2.813 uIU/mL (04-14 @ 07:28)  Thyroid Stimulating Hormone, Serum: 2.877 uIU/mL (04-14 @ 00:34)      HbA1C: 6.9 % (04-05 @ 06:59)  7.6 % (03-31 @ 05:58)    CAPILLARY BLOOD GLUCOSE      POCT Blood Glucose.: 118 mg/dL (02 Jun 2020 08:34)  POCT Blood Glucose.: 209 mg/dL (01 Jun 2020 21:41)  POCT Blood Glucose.: 221 mg/dL (01 Jun 2020 16:25)  POCT Blood Glucose.: 174 mg/dL (01 Jun 2020 11:33)      Insulin Sliding Scale requirements X 24 Hours:    RADIOLOGY & ADDITIONAL TESTS:    A/P: 60y Male with history of DM type II presenting for       1.  DM -     Please continue           units lantus at bedtime  / in the morning and        units lispro with meals and lispro moderate / low dose sliding scale 4 times daily with meals and at bedtime.  Please continue consistent carbohydrate diet.      Goal FSG is   Will continue to monitor   For discharge, pt can continue    Pt can follow up at discharge with [facility] by calling  to make an appointment.   Will discuss case with     and update primary team INTERVAL HPI/OVERNIGHT EVENTS:    Patient is a 60y old  Male who presents with a chief complaint of COVID (25 May 2020 08:33)  Spoke to the patient over the phone  SPoke to the primary team and the nurse taking care of the patient  Saturating well on RA. Appetite is good.  He was having some issues taking care of the ostomy bag   Dispo pending rehab placement   On salt tabs 1gm TID    FSG & Insulin received:  Yesterday:  pre-dinner fs, 8 + 4 units lispro, did not eat much dinner, had glucerna, fruit cocktail  bedtime fs, 24 lantus   units + 4 units lispro SS + 10 units lispro premeal  Today:  pre-breakfast fs, 10 nutritional lispro, had eggs, bread, sausage, glucerna  pre-lunch fs     Pt reports the following symptoms:  CONSTITUTIONAL:  Negative fever or chills  CARDIOVASCULAR:  Negative for chest pain or palpitations  RESPIRATORY:  Negative for cough, wheezing, or SOB   GASTROINTESTINAL:  Negative for vomiting, diarrhea, constipation, or abdominal pain  NEUROLOGIC:  No headache, confusion, dizziness, lightheadedness      MEDICATIONS  (STANDING):  amLODIPine   Tablet 10 milliGRAM(s) Oral daily  AQUAPHOR (petrolatum Ointment) 1 Application(s) Topical two times a day  atorvastatin 10 milliGRAM(s) Oral at bedtime  baclofen 2.5 milliGRAM(s) Oral every 12 hours  brimonidine 0.2% Ophthalmic Solution 1 Drop(s) Both EYES three times a day  dextrose 5%. 1000 milliLiter(s) (50 mL/Hr) IV Continuous <Continuous>  dextrose 50% Injectable 12.5 Gram(s) IV Push once  dextrose 50% Injectable 25 Gram(s) IV Push once  dextrose 50% Injectable 25 Gram(s) IV Push once  dorzolamide 2% Ophthalmic Solution 1 Drop(s) Both EYES three times a day  gabapentin 300 milliGRAM(s) Oral every 8 hours  heparin   Injectable 7500 Unit(s) SubCutaneous every 8 hours  insulin glargine Injectable (LANTUS) 24 Unit(s) SubCutaneous at bedtime  insulin lispro (HumaLOG) corrective regimen sliding scale   SubCutaneous Before meals and at bedtime  insulin lispro Injectable (HumaLOG) 10 Unit(s) SubCutaneous three times a day before meals  loperamide 2 milliGRAM(s) Oral daily  pantoprazole    Tablet 40 milliGRAM(s) Oral every 12 hours  rifAXIMin 550 milliGRAM(s) Oral two times a day  sodium bicarbonate 1300 milliGRAM(s) Oral three times a day  sodium chloride 1 Gram(s) Oral three times a day  traZODone 100 milliGRAM(s) Oral at bedtime    MEDICATIONS  (PRN):  acetaminophen   Tablet .. 500 milliGRAM(s) Oral every 6 hours PRN Mild Pain (1 - 3), Moderate Pain (4 - 6)  dextrose 40% Gel 15 Gram(s) Oral once PRN Blood Glucose LESS THAN 70 milliGRAM(s)/deciliter  glucagon  Injectable 1 milliGRAM(s) IntraMuscular once PRN Glucose LESS THAN 70 milligrams/deciliter  loperamide 2 milliGRAM(s) Oral daily PRN high ostomy output      PHYSICAL EXAM  Vital Signs Last 24 Hrs  T(C): 36.5 (2020 05:22), Max: 36.8 (2020 14:00)  T(F): 97.7 (2020 05:22), Max: 98.2 (2020 14:00)  HR: 58 (2020 05:22) (58 - 70)  BP: 128/53 (2020 05:22) (121/56 - 145/74)  BP(mean): --  RR: 18 (2020 05:22) (18 - 21)  SpO2: 99% (2020 05:22) (99% - 99%)      Due to the nature of this patient’s COVID-19 isolation status (either confirmed or suspected), this note was prepared without a bedside physical examination to prevent spread of infection and to conserve personal protective equipment during this nationwide pandemic. If possible, direct patient communication occurred via electronic measures or telephone conversation. Examination highlights were provided by a bedside nurse wearing personal protective equipment and review of pertinent medical records. Objective data (vital signs, urine output, lab results, imaging studies, medications, etc) were reviewed in detail. Face to face visits and physical examination have been limited only to patients for whom it is required for medical decision making.  LABS:    Thyroid Stimulating Hormone, Serum: 2.613 uIU/mL ( @ 10:28)  Thyroid Stimulating Hormone, Serum: 2.389 uIU/mL (05-18 @ 07:38)  Thyroid Stimulating Hormone, Serum: 3.869 uIU/mL ( @ 06:50)  Thyroid Stimulating Hormone, Serum: 3.104 uIU/mL ( @ 06:10)  Thyroid Stimulating Hormone, Serum: 2.712 uIU/mL ( @ 06:31)  Thyroid Stimulating Hormone, Serum: 3.063 uIU/mL ( @ 06:31)  Thyroid Stimulating Hormone, Serum: 3.509 uIU/mL ( @ 06:07)  Thyroid Stimulating Hormone, Serum: 3.325 uIU/mL ( @ 06:17)  Thyroid Stimulating Hormone, Serum: 2.813 uIU/mL ( @ 07:28)  Thyroid Stimulating Hormone, Serum: 2.877 uIU/mL ( @ 00:34)      HbA1C: 6.9 % ( @ 06:59)  7.6 % ( @ 05:58)    CAPILLARY BLOOD GLUCOSE      POCT Blood Glucose.: 118 mg/dL (2020 08:34)  POCT Blood Glucose.: 209 mg/dL (2020 21:41)  POCT Blood Glucose.: 221 mg/dL (2020 16:25)  POCT Blood Glucose.: 174 mg/dL (2020 11:33)      Insulin Sliding Scale requirements X 24 Hours:    RADIOLOGY & ADDITIONAL TESTS:    A/P 60yMale with hx of DM now with COVID, worsening renal function, moderate hyperglycemia.     1.  DM: type 2  Please continue Lantus 24 units at bedtime  Please continue Lispro 10  units tid with meals.   Continue lispro moderate dose scale with meals and at bedtime.   Continue consistent carb diet  FSG Goal 100-180  GFR 40 and EF 65%    2.  Hyperlipidemia - goal LDL < 70  - on lipitor 10 mg once daily    3.  Hypothyroidism   - TSH on admission 2.8.   - levothyroxine 50mcg discontinued on  to assess necessity as there is no evidence of autoimmune hypothyroidism at this time.   - Repeat TSH 3.32 and free T4 1.03 ().   - TSH 3.5 and Free T4 1.01 on 5/3   TSH was 2.613 and Free T4 0.84 on   - repeat TFTs on       For discharge, TBD    Case seen and discussed with  and updated the primary team.  Pt can follow up at discharge with [facility] by calling  to make an appointment.

## 2020-06-02 NOTE — PROGRESS NOTE ADULT - SUBJECTIVE AND OBJECTIVE BOX
O/N Events: no acute events    Subjective: Patient seen at the bedside. Reporting that his ostomy output has improved and was more formed and less leaky overnight.    VITALS  Vital Signs Last 24 Hrs  T(C): 36.5 (02 Jun 2020 05:22), Max: 36.8 (01 Jun 2020 14:00)  T(F): 97.7 (02 Jun 2020 05:22), Max: 98.2 (01 Jun 2020 14:00)  HR: 58 (02 Jun 2020 05:22) (58 - 70)  BP: 128/53 (02 Jun 2020 05:22) (121/56 - 145/74)  BP(mean): --  RR: 18 (02 Jun 2020 05:22) (18 - 21)  SpO2: 99% (02 Jun 2020 05:22) (99% - 99%)    CAPILLARY BLOOD GLUCOSE      POCT Blood Glucose.: 118 mg/dL (02 Jun 2020 08:34)  POCT Blood Glucose.: 209 mg/dL (01 Jun 2020 21:41)  POCT Blood Glucose.: 221 mg/dL (01 Jun 2020 16:25)  POCT Blood Glucose.: 174 mg/dL (01 Jun 2020 11:33)      PHYSICAL EXAM  General appearance: sitting in chair, NAD  HEENT: NC/AT  Respiratory: breath sounds clear to auscultation throughout all lung fields, no accessory muscle use, no wheezing  Cardiovascular: regular rate and rhythm  Gastrointestinal: soft, mildly tender to palpation in small area in LLQ, moderately distended, normoactive bowel sounds, well-healing midline scar present with small opening in the center draining purulent looking material, ostomy with liquid yellow output, no formed stool present  Extremities: WWP  Neurological: alert and oriented    MEDICATIONS  (STANDING):  amLODIPine   Tablet 10 milliGRAM(s) Oral daily  AQUAPHOR (petrolatum Ointment) 1 Application(s) Topical two times a day  atorvastatin 10 milliGRAM(s) Oral at bedtime  baclofen 2.5 milliGRAM(s) Oral every 12 hours  brimonidine 0.2% Ophthalmic Solution 1 Drop(s) Both EYES three times a day  dextrose 5%. 1000 milliLiter(s) (50 mL/Hr) IV Continuous <Continuous>  dextrose 50% Injectable 12.5 Gram(s) IV Push once  dextrose 50% Injectable 25 Gram(s) IV Push once  dextrose 50% Injectable 25 Gram(s) IV Push once  dorzolamide 2% Ophthalmic Solution 1 Drop(s) Both EYES three times a day  gabapentin 300 milliGRAM(s) Oral every 8 hours  heparin   Injectable 7500 Unit(s) SubCutaneous every 8 hours  insulin glargine Injectable (LANTUS) 24 Unit(s) SubCutaneous at bedtime  insulin lispro (HumaLOG) corrective regimen sliding scale   SubCutaneous Before meals and at bedtime  insulin lispro Injectable (HumaLOG) 10 Unit(s) SubCutaneous three times a day before meals  loperamide 2 milliGRAM(s) Oral daily  pantoprazole    Tablet 40 milliGRAM(s) Oral every 12 hours  rifAXIMin 550 milliGRAM(s) Oral two times a day  sodium bicarbonate 1300 milliGRAM(s) Oral three times a day  sodium chloride 1 Gram(s) Oral three times a day  traZODone 100 milliGRAM(s) Oral at bedtime    MEDICATIONS  (PRN):  acetaminophen   Tablet .. 500 milliGRAM(s) Oral every 6 hours PRN Mild Pain (1 - 3), Moderate Pain (4 - 6)  dextrose 40% Gel 15 Gram(s) Oral once PRN Blood Glucose LESS THAN 70 milliGRAM(s)/deciliter  glucagon  Injectable 1 milliGRAM(s) IntraMuscular once PRN Glucose LESS THAN 70 milligrams/deciliter  loperamide 2 milliGRAM(s) Oral daily PRN high ostomy output      No Known Allergies      LABS    05-31    135  |  107  |  19  ----------------------------<  174<H>  5.2   |  18<L>  |  1.21    Ca    9.5      31 May 2020 10:34                PROCEDURES/IMAGING: reviewed.

## 2020-06-02 NOTE — PROGRESS NOTE ADULT - PROBLEM SELECTOR PLAN 1
S/p ex lap with subtotal colectomy+ileostomy on 3/31. Course c/b fever and purulent wound infection (4/9) s/p vanc/zosyn, now resolved. No melena in ostomy for multiple days.  - continue Loperamide 2 mg daily (will be able to get 7 days of meds on d/c) + Loperamide 2 mg PRN (has not required)  - general surgery following, appreciate recs  - wound care

## 2020-06-02 NOTE — ADVANCED PRACTICE NURSE CONSULT - ASSESSMENT
New 2 3/4" [place] 2 piece appliance (size is per pt's request) placed over stoma. Appliance had been cut too big so pt has irritation at periwound area. Explained to patient that he needs to keep the same appliance/pouch on for at least 3-4 days instead of changing, unless it leaks. Also reviewed how to remove gas from the pouch. Extra supplies brought to pt's room.

## 2020-06-02 NOTE — PROGRESS NOTE ADULT - PROBLEM SELECTOR PLAN 2
Cirrhosis c/b ascites likely 2/2 alcohol abuse. SBP ruled out, hepatic encephalopathy poa now resolved. Paracentesis on 4/10 with negative cytology. Abdominal U/S 5/3 small ascites.   - continue Rifaximin 550 mg BID for hepatic encephalopathy  - holding Lasix + Spironolactone  - holding Lactulose given high ostomy output  - maximum Tylenol 2 g per day

## 2020-06-02 NOTE — PROGRESS NOTE ADULT - ASSESSMENT
60M PMH HTN, DM (w/ neuropathy), IVDU, ETOH liver cirrhosis who presented to [facility] s/p fall at home; was found with toxic megacolon and admitted to SICU; now s/p ex lap + subtotal colectomy, ileostomy (3/31). Hospital course complicated by COVID, MEG, and anemia with suspicion for GI source/varices (refused further GI workup). Now stable for discharge and pending establishment of proper ostomy care services. COVID negative on 5/23.

## 2020-06-02 NOTE — PROGRESS NOTE ADULT - PROBLEM SELECTOR PLAN 3
Type 2 DM. A1C 6.9% on 4/5.  - Lantus 24 units in the evening  - Lispro 10 units TID pre-meal   - continue mISS  - discontinued metformin  - f/u endo recs    #Hypothyroidism   TSH 2.813; TPO and antithyroid Abs neg. Levothyroxine 50 mcg discontinued per endocrinology 4/29 given low concern for autoimmune hypothyroidism at this time; will assess necessity of continuation of levothyroxine.  - f/u endo recs

## 2020-06-02 NOTE — PROGRESS NOTE ADULT - PROBLEM SELECTOR PLAN 4
RESOLVED. Baseline Cr 1.5-2, currently stable around 1.3.  - monitor I/Os   - renal following - appreciate recs  - avoid nephrotoxic agents    #Non-anion gap metabolic acidosis   - continue sodium bicarbonate 1300 mg TID  - f/u renal recs

## 2020-06-03 LAB
GLUCOSE BLDC GLUCOMTR-MCNC: 127 MG/DL — HIGH (ref 70–99)
GLUCOSE BLDC GLUCOMTR-MCNC: 159 MG/DL — HIGH (ref 70–99)
GLUCOSE BLDC GLUCOMTR-MCNC: 166 MG/DL — HIGH (ref 70–99)
GLUCOSE BLDC GLUCOMTR-MCNC: 182 MG/DL — HIGH (ref 70–99)

## 2020-06-03 PROCEDURE — 99231 SBSQ HOSP IP/OBS SF/LOW 25: CPT | Mod: GC

## 2020-06-03 PROCEDURE — 99233 SBSQ HOSP IP/OBS HIGH 50: CPT | Mod: GC

## 2020-06-03 RX ADMIN — Medication 1300 MILLIGRAM(S): at 13:16

## 2020-06-03 RX ADMIN — SODIUM CHLORIDE 1 GRAM(S): 9 INJECTION INTRAMUSCULAR; INTRAVENOUS; SUBCUTANEOUS at 13:17

## 2020-06-03 RX ADMIN — BRIMONIDINE TARTRATE 1 DROP(S): 2 SOLUTION/ DROPS OPHTHALMIC at 13:17

## 2020-06-03 RX ADMIN — GABAPENTIN 300 MILLIGRAM(S): 400 CAPSULE ORAL at 22:28

## 2020-06-03 RX ADMIN — Medication 1300 MILLIGRAM(S): at 22:29

## 2020-06-03 RX ADMIN — GABAPENTIN 300 MILLIGRAM(S): 400 CAPSULE ORAL at 06:23

## 2020-06-03 RX ADMIN — Medication 2: at 22:29

## 2020-06-03 RX ADMIN — Medication 2: at 08:17

## 2020-06-03 RX ADMIN — HEPARIN SODIUM 7500 UNIT(S): 5000 INJECTION INTRAVENOUS; SUBCUTANEOUS at 06:25

## 2020-06-03 RX ADMIN — AMLODIPINE BESYLATE 10 MILLIGRAM(S): 2.5 TABLET ORAL at 06:23

## 2020-06-03 RX ADMIN — BRIMONIDINE TARTRATE 1 DROP(S): 2 SOLUTION/ DROPS OPHTHALMIC at 22:28

## 2020-06-03 RX ADMIN — Medication 2.5 MILLIGRAM(S): at 06:24

## 2020-06-03 RX ADMIN — Medication 2: at 11:57

## 2020-06-03 RX ADMIN — Medication 2 MILLIGRAM(S): at 11:58

## 2020-06-03 RX ADMIN — Medication 10 UNIT(S): at 08:17

## 2020-06-03 RX ADMIN — INSULIN GLARGINE 24 UNIT(S): 100 INJECTION, SOLUTION SUBCUTANEOUS at 22:28

## 2020-06-03 RX ADMIN — Medication 2.5 MILLIGRAM(S): at 17:19

## 2020-06-03 RX ADMIN — Medication 10 UNIT(S): at 17:19

## 2020-06-03 RX ADMIN — DORZOLAMIDE HYDROCHLORIDE 1 DROP(S): 20 SOLUTION/ DROPS OPHTHALMIC at 22:28

## 2020-06-03 RX ADMIN — PANTOPRAZOLE SODIUM 40 MILLIGRAM(S): 20 TABLET, DELAYED RELEASE ORAL at 17:20

## 2020-06-03 RX ADMIN — SODIUM CHLORIDE 1 GRAM(S): 9 INJECTION INTRAMUSCULAR; INTRAVENOUS; SUBCUTANEOUS at 06:24

## 2020-06-03 RX ADMIN — Medication 1300 MILLIGRAM(S): at 06:23

## 2020-06-03 RX ADMIN — BRIMONIDINE TARTRATE 1 DROP(S): 2 SOLUTION/ DROPS OPHTHALMIC at 06:21

## 2020-06-03 RX ADMIN — HEPARIN SODIUM 7500 UNIT(S): 5000 INJECTION INTRAVENOUS; SUBCUTANEOUS at 13:16

## 2020-06-03 RX ADMIN — Medication 10 UNIT(S): at 11:57

## 2020-06-03 RX ADMIN — HEPARIN SODIUM 7500 UNIT(S): 5000 INJECTION INTRAVENOUS; SUBCUTANEOUS at 22:28

## 2020-06-03 RX ADMIN — GABAPENTIN 300 MILLIGRAM(S): 400 CAPSULE ORAL at 13:17

## 2020-06-03 RX ADMIN — DORZOLAMIDE HYDROCHLORIDE 1 DROP(S): 20 SOLUTION/ DROPS OPHTHALMIC at 06:21

## 2020-06-03 RX ADMIN — DORZOLAMIDE HYDROCHLORIDE 1 DROP(S): 20 SOLUTION/ DROPS OPHTHALMIC at 13:18

## 2020-06-03 RX ADMIN — SODIUM CHLORIDE 1 GRAM(S): 9 INJECTION INTRAMUSCULAR; INTRAVENOUS; SUBCUTANEOUS at 22:29

## 2020-06-03 RX ADMIN — ATORVASTATIN CALCIUM 10 MILLIGRAM(S): 80 TABLET, FILM COATED ORAL at 22:28

## 2020-06-03 RX ADMIN — Medication 100 MILLIGRAM(S): at 22:29

## 2020-06-03 RX ADMIN — Medication 1 APPLICATION(S): at 06:22

## 2020-06-03 RX ADMIN — PANTOPRAZOLE SODIUM 40 MILLIGRAM(S): 20 TABLET, DELAYED RELEASE ORAL at 06:23

## 2020-06-03 RX ADMIN — Medication 1 APPLICATION(S): at 17:15

## 2020-06-03 NOTE — PROGRESS NOTE ADULT - ASSESSMENT
60M PMH HTN, DM (w/ neuropathy), IVDU, ETOH liver cirrhosis who presented to [facility] s/p fall at home; was found with toxic megacolon and admitted to SICU; now s/p ex lap + subtotal colectomy, ileostomy (3/31). Hospital course complicated by COVID, MEG, and anemia with suspicion for GI source/varices (refused further GI workup). Now stable for discharge and pending establishment of proper ostomy care services. COVID negative on 5/23.

## 2020-06-03 NOTE — PROGRESS NOTE ADULT - ATTENDING COMMENTS
Glucoses remain moderately elevated (160-180) though again stable--little response to the gradual increases in insulin doses.  Will increase further to 27 units Lantus/12 units pre-meal lispro

## 2020-06-03 NOTE — PROGRESS NOTE ADULT - SUBJECTIVE AND OBJECTIVE BOX
O/N Events: no acute events    Subjective: Patient seen at the bedside. Ostomy output is more formed. Patient changed ostomy bag today. Otherwise he has no complaints.    VITALS  Vital Signs Last 24 Hrs  T(C): 36.8 (03 Jun 2020 06:50), Max: 36.8 (03 Jun 2020 06:50)  T(F): 98.2 (03 Jun 2020 06:50), Max: 98.2 (03 Jun 2020 06:50)  HR: 67 (03 Jun 2020 06:50) (62 - 67)  BP: 153/63 (03 Jun 2020 06:50) (117/58 - 153/63)  BP(mean): --  RR: 18 (03 Jun 2020 06:50) (18 - 18)  SpO2: 99% (03 Jun 2020 06:50) (98% - 99%)    CAPILLARY BLOOD GLUCOSE      POCT Blood Glucose.: 159 mg/dL (03 Jun 2020 07:29)  POCT Blood Glucose.: 174 mg/dL (02 Jun 2020 21:13)  POCT Blood Glucose.: 178 mg/dL (02 Jun 2020 16:36)  POCT Blood Glucose.: 110 mg/dL (02 Jun 2020 11:38)      PHYSICAL EXAM  General appearance: sitting in chair next to bed, well-appearing, NAD  HEENT: MMM  Respiratory: breath sounds clear to auscultation throughout all lung fields, no accessory muscle use, no wheezing/rales/rhonchi   Cardiovascular: regular rate and rhythm  Gastrointestinal: soft, non-tender, mildly distended, normoactive bowel sounds, healing midline scar with small opening at the center draining small amount of pus-like fluid, ostomy output formed and brown/orange  Extremities: WWP, no lower extremity edema  Neurological: AOx3    MEDICATIONS  (STANDING):  amLODIPine   Tablet 10 milliGRAM(s) Oral daily  AQUAPHOR (petrolatum Ointment) 1 Application(s) Topical two times a day  atorvastatin 10 milliGRAM(s) Oral at bedtime  baclofen 2.5 milliGRAM(s) Oral every 12 hours  brimonidine 0.2% Ophthalmic Solution 1 Drop(s) Both EYES three times a day  dextrose 5%. 1000 milliLiter(s) (50 mL/Hr) IV Continuous <Continuous>  dextrose 50% Injectable 12.5 Gram(s) IV Push once  dextrose 50% Injectable 25 Gram(s) IV Push once  dextrose 50% Injectable 25 Gram(s) IV Push once  dorzolamide 2% Ophthalmic Solution 1 Drop(s) Both EYES three times a day  gabapentin 300 milliGRAM(s) Oral every 8 hours  heparin   Injectable 7500 Unit(s) SubCutaneous every 8 hours  insulin glargine Injectable (LANTUS) 24 Unit(s) SubCutaneous at bedtime  insulin lispro (HumaLOG) corrective regimen sliding scale   SubCutaneous Before meals and at bedtime  insulin lispro Injectable (HumaLOG) 10 Unit(s) SubCutaneous three times a day before meals  loperamide 2 milliGRAM(s) Oral daily  pantoprazole    Tablet 40 milliGRAM(s) Oral every 12 hours  rifAXIMin 550 milliGRAM(s) Oral two times a day  sodium bicarbonate 1300 milliGRAM(s) Oral three times a day  sodium chloride 1 Gram(s) Oral three times a day  traZODone 100 milliGRAM(s) Oral at bedtime    MEDICATIONS  (PRN):  acetaminophen   Tablet .. 500 milliGRAM(s) Oral every 6 hours PRN Mild Pain (1 - 3), Moderate Pain (4 - 6)  dextrose 40% Gel 15 Gram(s) Oral once PRN Blood Glucose LESS THAN 70 milliGRAM(s)/deciliter  glucagon  Injectable 1 milliGRAM(s) IntraMuscular once PRN Glucose LESS THAN 70 milligrams/deciliter  loperamide 2 milliGRAM(s) Oral daily PRN high ostomy output      No Known Allergies      LABS                    PROCEDURES/IMAGING: reviewed.

## 2020-06-03 NOTE — PROGRESS NOTE ADULT - PROBLEM SELECTOR PLAN 3
Type 2 DM. A1C 6.9% on 4/5.  - Lantus 24 units in the evening  - Lispro 10 units TID pre-meal   - continue mISS  - discontinued metformin  - f/u endo recs    #Hypothyroidism   TSH 2.813; TPO and antithyroid Abs neg. Levothyroxine 50 mcg discontinued per endocrinology 4/29 given low concern for autoimmune hypothyroidism at this time; will assess necessity of continuation of levothyroxine.  - plan to repeat TFTs on June 9th

## 2020-06-03 NOTE — PROGRESS NOTE ADULT - SUBJECTIVE AND OBJECTIVE BOX
INTERVAL HPI/OVERNIGHT EVENTS:    Patient is a 60y old  Male who presents with a chief complaint of COVID (25 May 2020 08:33)      Pt reports the following symptoms:    CONSTITUTIONAL:  Negative fever or chills, feels well, good appetite  EYES:  Negative  blurry vision or double vision  CARDIOVASCULAR:  Negative for chest pain or palpitations  RESPIRATORY:  Negative for cough, wheezing, or SOB   GASTROINTESTINAL:  Negative for nausea, vomiting, diarrhea, constipation, or abdominal pain  GENITOURINARY:  Negative frequency, urgency or dysuria  NEUROLOGIC:  No headache, confusion, dizziness, lightheadedness    MEDICATIONS  (STANDING):  amLODIPine   Tablet 10 milliGRAM(s) Oral daily  AQUAPHOR (petrolatum Ointment) 1 Application(s) Topical two times a day  atorvastatin 10 milliGRAM(s) Oral at bedtime  baclofen 2.5 milliGRAM(s) Oral every 12 hours  brimonidine 0.2% Ophthalmic Solution 1 Drop(s) Both EYES three times a day  dextrose 5%. 1000 milliLiter(s) (50 mL/Hr) IV Continuous <Continuous>  dextrose 50% Injectable 12.5 Gram(s) IV Push once  dextrose 50% Injectable 25 Gram(s) IV Push once  dextrose 50% Injectable 25 Gram(s) IV Push once  dorzolamide 2% Ophthalmic Solution 1 Drop(s) Both EYES three times a day  gabapentin 300 milliGRAM(s) Oral every 8 hours  heparin   Injectable 7500 Unit(s) SubCutaneous every 8 hours  insulin glargine Injectable (LANTUS) 24 Unit(s) SubCutaneous at bedtime  insulin lispro (HumaLOG) corrective regimen sliding scale   SubCutaneous Before meals and at bedtime  insulin lispro Injectable (HumaLOG) 10 Unit(s) SubCutaneous three times a day before meals  loperamide 2 milliGRAM(s) Oral daily  pantoprazole    Tablet 40 milliGRAM(s) Oral every 12 hours  rifAXIMin 550 milliGRAM(s) Oral two times a day  sodium bicarbonate 1300 milliGRAM(s) Oral three times a day  sodium chloride 1 Gram(s) Oral three times a day  traZODone 100 milliGRAM(s) Oral at bedtime    MEDICATIONS  (PRN):  acetaminophen   Tablet .. 500 milliGRAM(s) Oral every 6 hours PRN Mild Pain (1 - 3), Moderate Pain (4 - 6)  dextrose 40% Gel 15 Gram(s) Oral once PRN Blood Glucose LESS THAN 70 milliGRAM(s)/deciliter  glucagon  Injectable 1 milliGRAM(s) IntraMuscular once PRN Glucose LESS THAN 70 milligrams/deciliter  loperamide 2 milliGRAM(s) Oral daily PRN high ostomy output      PHYSICAL EXAM  Vital Signs Last 24 Hrs  T(C): 36.8 (03 Jun 2020 06:50), Max: 36.8 (03 Jun 2020 06:50)  T(F): 98.2 (03 Jun 2020 06:50), Max: 98.2 (03 Jun 2020 06:50)  HR: 67 (03 Jun 2020 06:50) (62 - 67)  BP: 153/63 (03 Jun 2020 06:50) (117/58 - 153/63)  BP(mean): --  RR: 18 (03 Jun 2020 06:50) (18 - 18)  SpO2: 99% (03 Jun 2020 06:50) (98% - 99%)    Constitutional: wn/wd in NAD.   HEENT: NCAT, MMM, OP clear, EOMI, no proptosis or lid retraction  Neck: no thyromegaly or palpable thyroid nodules   Respiratory: lungs CTAB.  Cardiovascular: regular rhythm, normal S1 and S2, no audible murmurs, no peripheral edema  GI: soft, NT/ND, no masses/HSM appreciated.  Neurology: no tremors, DTR 2+  Skin: no visible rashes/lesions  Psychiatric: AAO x 3, normal affect/mood.    LABS:              Thyroid Stimulating Hormone, Serum: 2.613 uIU/mL (05-26 @ 10:28)  Thyroid Stimulating Hormone, Serum: 2.389 uIU/mL (05-18 @ 07:38)  Thyroid Stimulating Hormone, Serum: 3.869 uIU/mL (05-13 @ 06:50)  Thyroid Stimulating Hormone, Serum: 3.104 uIU/mL (05-11 @ 06:10)  Thyroid Stimulating Hormone, Serum: 2.712 uIU/mL (05-07 @ 06:31)  Thyroid Stimulating Hormone, Serum: 3.063 uIU/mL (05-07 @ 06:31)  Thyroid Stimulating Hormone, Serum: 3.509 uIU/mL (05-03 @ 06:07)  Thyroid Stimulating Hormone, Serum: 3.325 uIU/mL (04-29 @ 06:17)  Thyroid Stimulating Hormone, Serum: 2.813 uIU/mL (04-14 @ 07:28)  Thyroid Stimulating Hormone, Serum: 2.877 uIU/mL (04-14 @ 00:34)      HbA1C: 6.9 % (04-05 @ 06:59)  7.6 % (03-31 @ 05:58)    CAPILLARY BLOOD GLUCOSE      POCT Blood Glucose.: 159 mg/dL (03 Jun 2020 07:29)  POCT Blood Glucose.: 174 mg/dL (02 Jun 2020 21:13)  POCT Blood Glucose.: 178 mg/dL (02 Jun 2020 16:36)  POCT Blood Glucose.: 110 mg/dL (02 Jun 2020 11:38)      Insulin Sliding Scale requirements X 24 Hours:    RADIOLOGY & ADDITIONAL TESTS:    A/P: 60y Male with history of DM type II presenting for       1.  DM -     Please continue           units lantus at bedtime  / in the morning and        units lispro with meals and lispro moderate / low dose sliding scale 4 times daily with meals and at bedtime.  Please continue consistent carbohydrate diet.      Goal FSG is   Will continue to monitor   For discharge, pt can continue    Pt can follow up at discharge with [facility] by calling  to make an appointment.   Will discuss case with     and update primary team INTERVAL HPI/OVERNIGHT EVENTS:    Patient is a 60y old  Male who presents with a chief complaint of COVID (25 May 2020 08:33)  SPoke to the primary team and the nurse taking care of the patient  Saturating well on RA. Appetite is good.  he was able to change the ostomy bag today with the help from nurse  Dispo pending rehab placement  On salt tabs 1gm TID    FSG & Insulin received:  Yesterday:  pre-dinner fs, 10 + 2 units lispro  bedtime fs, 24 lantus   units + 2 units lispro SS  Today:  pre-breakfast fs, 10 nutritional lispro + 2 units lispro SS  pre-lunch fs     Pt reports the following symptoms:  CONSTITUTIONAL:  Negative fever or chills  CARDIOVASCULAR:  Negative for chest pain or palpitations  RESPIRATORY:  Negative for cough, wheezing, or SOB   GASTROINTESTINAL:  Negative for vomiting, diarrhea, constipation, or abdominal pain  NEUROLOGIC:  No headache, confusion, dizziness, lightheadedness      MEDICATIONS  (STANDING):  amLODIPine   Tablet 10 milliGRAM(s) Oral daily  AQUAPHOR (petrolatum Ointment) 1 Application(s) Topical two times a day  atorvastatin 10 milliGRAM(s) Oral at bedtime  baclofen 2.5 milliGRAM(s) Oral every 12 hours  brimonidine 0.2% Ophthalmic Solution 1 Drop(s) Both EYES three times a day  dextrose 5%. 1000 milliLiter(s) (50 mL/Hr) IV Continuous <Continuous>  dextrose 50% Injectable 12.5 Gram(s) IV Push once  dextrose 50% Injectable 25 Gram(s) IV Push once  dextrose 50% Injectable 25 Gram(s) IV Push once  dorzolamide 2% Ophthalmic Solution 1 Drop(s) Both EYES three times a day  gabapentin 300 milliGRAM(s) Oral every 8 hours  heparin   Injectable 7500 Unit(s) SubCutaneous every 8 hours  insulin glargine Injectable (LANTUS) 24 Unit(s) SubCutaneous at bedtime  insulin lispro (HumaLOG) corrective regimen sliding scale   SubCutaneous Before meals and at bedtime  insulin lispro Injectable (HumaLOG) 10 Unit(s) SubCutaneous three times a day before meals  loperamide 2 milliGRAM(s) Oral daily  pantoprazole    Tablet 40 milliGRAM(s) Oral every 12 hours  rifAXIMin 550 milliGRAM(s) Oral two times a day  sodium bicarbonate 1300 milliGRAM(s) Oral three times a day  sodium chloride 1 Gram(s) Oral three times a day  traZODone 100 milliGRAM(s) Oral at bedtime    MEDICATIONS  (PRN):  acetaminophen   Tablet .. 500 milliGRAM(s) Oral every 6 hours PRN Mild Pain (1 - 3), Moderate Pain (4 - 6)  dextrose 40% Gel 15 Gram(s) Oral once PRN Blood Glucose LESS THAN 70 milliGRAM(s)/deciliter  glucagon  Injectable 1 milliGRAM(s) IntraMuscular once PRN Glucose LESS THAN 70 milligrams/deciliter  loperamide 2 milliGRAM(s) Oral daily PRN high ostomy output      PHYSICAL EXAM  Vital Signs Last 24 Hrs  T(C): 36.8 (2020 06:50), Max: 36.8 (2020 06:50)  T(F): 98.2 (2020 06:50), Max: 98.2 (2020 06:50)  HR: 67 (2020 06:50) (62 - 67)  BP: 153/63 (2020 06:50) (117/58 - 153/63)  BP(mean): --  RR: 18 (2020 06:50) (18 - 18)  SpO2: 99% (2020 06:50) (98% - 99%)    Due to the nature of this patient’s COVID-19 isolation status (either confirmed or suspected), this note was prepared without a bedside physical examination to prevent spread of infection and to conserve personal protective equipment during this nationwide pandemic. If possible, direct patient communication occurred via electronic measures or telephone conversation. Examination highlights were provided by a bedside nurse wearing personal protective equipment and review of pertinent medical records. Objective data (vital signs, urine output, lab results, imaging studies, medications, etc) were reviewed in detail. Face to face visits and physical examination have been limited only to patients for whom it is required for medical decision making.    LABS:    Thyroid Stimulating Hormone, Serum: 2.613 uIU/mL ( @ 10:28)  Thyroid Stimulating Hormone, Serum: 2.389 uIU/mL ( @ 07:38)  Thyroid Stimulating Hormone, Serum: 3.869 uIU/mL ( @ 06:50)  Thyroid Stimulating Hormone, Serum: 3.104 uIU/mL ( @ 06:10)  Thyroid Stimulating Hormone, Serum: 2.712 uIU/mL ( @ 06:31)  Thyroid Stimulating Hormone, Serum: 3.063 uIU/mL ( @ 06:31)  Thyroid Stimulating Hormone, Serum: 3.509 uIU/mL ( @ 06:07)  Thyroid Stimulating Hormone, Serum: 3.325 uIU/mL ( @ 06:17)  Thyroid Stimulating Hormone, Serum: 2.813 uIU/mL ( @ 07:28)  Thyroid Stimulating Hormone, Serum: 2.877 uIU/mL ( @ 00:34)      HbA1C: 6.9 % ( @ 06:59)  7.6 % ( @ 05:58)    CAPILLARY BLOOD GLUCOSE      POCT Blood Glucose.: 159 mg/dL (2020 07:29)  POCT Blood Glucose.: 174 mg/dL (2020 21:13)  POCT Blood Glucose.: 178 mg/dL (2020 16:36)  POCT Blood Glucose.: 110 mg/dL (2020 11:38)      Insulin Sliding Scale requirements X 24 Hours:    RADIOLOGY & ADDITIONAL TESTS:    A/P 60yMale with hx of DM now with COVID, worsening renal function, moderate hyperglycemia.     1.  DM: type 2  Please increase to Lantus 27 units at bedtime  Please increase to Lispro 12  units tid with meals.   Continue lispro moderate dose scale with meals and at bedtime.   Continue consistent carb diet  FSG Goal 100-180  GFR 40 and EF 65%    2.  Hyperlipidemia - goal LDL < 70  - on lipitor 10 mg once daily    3.  Hypothyroidism   - TSH on admission 2.8.   - levothyroxine 50mcg discontinued on  to assess necessity as there is no evidence of autoimmune hypothyroidism at this time.   - Repeat TSH 3.32 and free T4 1.03 ().   - TSH 3.5 and Free T4 1.01 on 5/3   TSH was 2.613 and Free T4 0.84 on   - repeat TFTs on       For discharge, TBD    Case seen and discussed with  and updated the primary team.  Pt can follow up at discharge with [facility] by calling  to make an appointment.

## 2020-06-04 PROBLEM — E78.5 HYPERLIPIDEMIA, UNSPECIFIED: Chronic | Status: ACTIVE | Noted: 2020-03-30

## 2020-06-04 PROBLEM — E11.9 TYPE 2 DIABETES MELLITUS WITHOUT COMPLICATIONS: Chronic | Status: ACTIVE | Noted: 2020-03-30

## 2020-06-04 PROBLEM — I10 ESSENTIAL (PRIMARY) HYPERTENSION: Chronic | Status: ACTIVE | Noted: 2020-03-30

## 2020-06-04 PROBLEM — F10.10 ALCOHOL ABUSE, UNCOMPLICATED: Chronic | Status: ACTIVE | Noted: 2020-03-30

## 2020-06-04 PROBLEM — D64.9 ANEMIA, UNSPECIFIED: Chronic | Status: ACTIVE | Noted: 2020-03-30

## 2020-06-04 LAB
ANION GAP SERPL CALC-SCNC: 11 MMOL/L — SIGNIFICANT CHANGE UP (ref 5–17)
BUN SERPL-MCNC: 18 MG/DL — SIGNIFICANT CHANGE UP (ref 7–23)
CALCIUM SERPL-MCNC: 9.8 MG/DL — SIGNIFICANT CHANGE UP (ref 8.4–10.5)
CHLORIDE SERPL-SCNC: 111 MMOL/L — HIGH (ref 96–108)
CO2 SERPL-SCNC: 18 MMOL/L — LOW (ref 22–31)
CREAT SERPL-MCNC: 1.1 MG/DL — SIGNIFICANT CHANGE UP (ref 0.5–1.3)
GLUCOSE BLDC GLUCOMTR-MCNC: 116 MG/DL — HIGH (ref 70–99)
GLUCOSE BLDC GLUCOMTR-MCNC: 125 MG/DL — HIGH (ref 70–99)
GLUCOSE BLDC GLUCOMTR-MCNC: 131 MG/DL — HIGH (ref 70–99)
GLUCOSE BLDC GLUCOMTR-MCNC: 168 MG/DL — HIGH (ref 70–99)
GLUCOSE BLDC GLUCOMTR-MCNC: 210 MG/DL — HIGH (ref 70–99)
GLUCOSE SERPL-MCNC: 125 MG/DL — HIGH (ref 70–99)
HCT VFR BLD CALC: 28.1 % — LOW (ref 39–50)
HGB BLD-MCNC: 8.9 G/DL — LOW (ref 13–17)
MAGNESIUM SERPL-MCNC: 1.6 MG/DL — SIGNIFICANT CHANGE UP (ref 1.6–2.6)
MCHC RBC-ENTMCNC: 29.9 PG — SIGNIFICANT CHANGE UP (ref 27–34)
MCHC RBC-ENTMCNC: 31.7 GM/DL — LOW (ref 32–36)
MCV RBC AUTO: 94.3 FL — SIGNIFICANT CHANGE UP (ref 80–100)
NRBC # BLD: 0 /100 WBCS — SIGNIFICANT CHANGE UP (ref 0–0)
PHOSPHATE SERPL-MCNC: 4.5 MG/DL — SIGNIFICANT CHANGE UP (ref 2.5–4.5)
PLATELET # BLD AUTO: 172 K/UL — SIGNIFICANT CHANGE UP (ref 150–400)
POTASSIUM SERPL-MCNC: 5.3 MMOL/L — SIGNIFICANT CHANGE UP (ref 3.5–5.3)
POTASSIUM SERPL-SCNC: 5.3 MMOL/L — SIGNIFICANT CHANGE UP (ref 3.5–5.3)
RBC # BLD: 2.98 M/UL — LOW (ref 4.2–5.8)
RBC # FLD: 17.2 % — HIGH (ref 10.3–14.5)
SODIUM SERPL-SCNC: 140 MMOL/L — SIGNIFICANT CHANGE UP (ref 135–145)
WBC # BLD: 5.8 K/UL — SIGNIFICANT CHANGE UP (ref 3.8–10.5)
WBC # FLD AUTO: 5.8 K/UL — SIGNIFICANT CHANGE UP (ref 3.8–10.5)

## 2020-06-04 PROCEDURE — 99233 SBSQ HOSP IP/OBS HIGH 50: CPT | Mod: GC

## 2020-06-04 PROCEDURE — 99231 SBSQ HOSP IP/OBS SF/LOW 25: CPT | Mod: GC

## 2020-06-04 RX ORDER — AMLODIPINE BESYLATE 2.5 MG/1
1 TABLET ORAL
Qty: 0 | Refills: 0 | DISCHARGE

## 2020-06-04 RX ORDER — GABAPENTIN 400 MG/1
1 CAPSULE ORAL
Qty: 21 | Refills: 0
Start: 2020-06-04 | End: 2020-06-10

## 2020-06-04 RX ORDER — LOPERAMIDE HCL 2 MG
1 TABLET ORAL
Qty: 7 | Refills: 0
Start: 2020-06-04 | End: 2020-06-10

## 2020-06-04 RX ORDER — BACLOFEN 100 %
1 POWDER (GRAM) MISCELLANEOUS
Qty: 0 | Refills: 0 | DISCHARGE

## 2020-06-04 RX ORDER — MAGNESIUM SULFATE 500 MG/ML
2 VIAL (ML) INJECTION ONCE
Refills: 0 | Status: COMPLETED | OUTPATIENT
Start: 2020-06-04 | End: 2020-06-04

## 2020-06-04 RX ORDER — GABAPENTIN 400 MG/1
4 CAPSULE ORAL
Qty: 0 | Refills: 0 | DISCHARGE
Start: 2020-06-04 | End: 2020-06-10

## 2020-06-04 RX ORDER — PANTOPRAZOLE SODIUM 20 MG/1
1 TABLET, DELAYED RELEASE ORAL
Qty: 14 | Refills: 0
Start: 2020-06-04 | End: 2020-06-10

## 2020-06-04 RX ORDER — ATORVASTATIN CALCIUM 80 MG/1
1 TABLET, FILM COATED ORAL
Qty: 7 | Refills: 0
Start: 2020-06-04 | End: 2020-06-10

## 2020-06-04 RX ORDER — SODIUM BICARBONATE 1 MEQ/ML
2 SYRINGE (ML) INTRAVENOUS
Qty: 42 | Refills: 0
Start: 2020-06-04 | End: 2020-06-10

## 2020-06-04 RX ORDER — DORZOLAMIDE HYDROCHLORIDE 20 MG/ML
1 SOLUTION/ DROPS OPHTHALMIC
Qty: 21 | Refills: 0
Start: 2020-06-04 | End: 2020-06-10

## 2020-06-04 RX ORDER — BACLOFEN 100 %
0.5 POWDER (GRAM) MISCELLANEOUS
Qty: 7 | Refills: 0
Start: 2020-06-04 | End: 2020-06-10

## 2020-06-04 RX ORDER — SODIUM CHLORIDE 9 MG/ML
1 INJECTION INTRAMUSCULAR; INTRAVENOUS; SUBCUTANEOUS
Qty: 21 | Refills: 0
Start: 2020-06-04 | End: 2020-06-10

## 2020-06-04 RX ORDER — AMLODIPINE BESYLATE 2.5 MG/1
1 TABLET ORAL
Qty: 7 | Refills: 0
Start: 2020-06-04 | End: 2020-06-10

## 2020-06-04 RX ORDER — BRIMONIDINE TARTRATE 2 MG/MG
1 SOLUTION/ DROPS OPHTHALMIC
Qty: 21 | Refills: 0
Start: 2020-06-04 | End: 2020-06-10

## 2020-06-04 RX ORDER — TRAZODONE HCL 50 MG
100 TABLET ORAL
Qty: 700 | Refills: 0
Start: 2020-06-04 | End: 2020-06-10

## 2020-06-04 RX ORDER — TRAZODONE HCL 50 MG
100 TABLET ORAL
Qty: 0 | Refills: 0 | DISCHARGE

## 2020-06-04 RX ADMIN — SODIUM CHLORIDE 1 GRAM(S): 9 INJECTION INTRAMUSCULAR; INTRAVENOUS; SUBCUTANEOUS at 06:53

## 2020-06-04 RX ADMIN — HEPARIN SODIUM 7500 UNIT(S): 5000 INJECTION INTRAVENOUS; SUBCUTANEOUS at 06:53

## 2020-06-04 RX ADMIN — Medication 1 APPLICATION(S): at 22:41

## 2020-06-04 RX ADMIN — Medication 100 MILLIGRAM(S): at 21:09

## 2020-06-04 RX ADMIN — BRIMONIDINE TARTRATE 1 DROP(S): 2 SOLUTION/ DROPS OPHTHALMIC at 06:52

## 2020-06-04 RX ADMIN — Medication 10 UNIT(S): at 17:15

## 2020-06-04 RX ADMIN — ATORVASTATIN CALCIUM 10 MILLIGRAM(S): 80 TABLET, FILM COATED ORAL at 21:09

## 2020-06-04 RX ADMIN — Medication 1300 MILLIGRAM(S): at 13:37

## 2020-06-04 RX ADMIN — Medication 10 UNIT(S): at 11:37

## 2020-06-04 RX ADMIN — Medication 1300 MILLIGRAM(S): at 21:04

## 2020-06-04 RX ADMIN — DORZOLAMIDE HYDROCHLORIDE 1 DROP(S): 20 SOLUTION/ DROPS OPHTHALMIC at 13:49

## 2020-06-04 RX ADMIN — AMLODIPINE BESYLATE 10 MILLIGRAM(S): 2.5 TABLET ORAL at 06:52

## 2020-06-04 RX ADMIN — Medication 10 UNIT(S): at 07:58

## 2020-06-04 RX ADMIN — SODIUM CHLORIDE 1 GRAM(S): 9 INJECTION INTRAMUSCULAR; INTRAVENOUS; SUBCUTANEOUS at 21:04

## 2020-06-04 RX ADMIN — Medication 500 MILLIGRAM(S): at 09:52

## 2020-06-04 RX ADMIN — HEPARIN SODIUM 7500 UNIT(S): 5000 INJECTION INTRAVENOUS; SUBCUTANEOUS at 21:09

## 2020-06-04 RX ADMIN — GABAPENTIN 300 MILLIGRAM(S): 400 CAPSULE ORAL at 06:52

## 2020-06-04 RX ADMIN — Medication 500 MILLIGRAM(S): at 21:03

## 2020-06-04 RX ADMIN — DORZOLAMIDE HYDROCHLORIDE 1 DROP(S): 20 SOLUTION/ DROPS OPHTHALMIC at 22:41

## 2020-06-04 RX ADMIN — BRIMONIDINE TARTRATE 1 DROP(S): 2 SOLUTION/ DROPS OPHTHALMIC at 22:41

## 2020-06-04 RX ADMIN — SODIUM CHLORIDE 1 GRAM(S): 9 INJECTION INTRAMUSCULAR; INTRAVENOUS; SUBCUTANEOUS at 13:37

## 2020-06-04 RX ADMIN — BRIMONIDINE TARTRATE 1 DROP(S): 2 SOLUTION/ DROPS OPHTHALMIC at 13:48

## 2020-06-04 RX ADMIN — Medication 2.5 MILLIGRAM(S): at 06:52

## 2020-06-04 RX ADMIN — Medication 1300 MILLIGRAM(S): at 06:53

## 2020-06-04 RX ADMIN — Medication 2 MILLIGRAM(S): at 09:26

## 2020-06-04 RX ADMIN — PANTOPRAZOLE SODIUM 40 MILLIGRAM(S): 20 TABLET, DELAYED RELEASE ORAL at 06:26

## 2020-06-04 RX ADMIN — Medication 2.5 MILLIGRAM(S): at 17:11

## 2020-06-04 RX ADMIN — HEPARIN SODIUM 7500 UNIT(S): 5000 INJECTION INTRAVENOUS; SUBCUTANEOUS at 13:38

## 2020-06-04 RX ADMIN — PANTOPRAZOLE SODIUM 40 MILLIGRAM(S): 20 TABLET, DELAYED RELEASE ORAL at 17:11

## 2020-06-04 RX ADMIN — DORZOLAMIDE HYDROCHLORIDE 1 DROP(S): 20 SOLUTION/ DROPS OPHTHALMIC at 06:52

## 2020-06-04 RX ADMIN — Medication 4: at 11:37

## 2020-06-04 RX ADMIN — Medication 1 APPLICATION(S): at 06:52

## 2020-06-04 RX ADMIN — GABAPENTIN 300 MILLIGRAM(S): 400 CAPSULE ORAL at 13:37

## 2020-06-04 RX ADMIN — GABAPENTIN 300 MILLIGRAM(S): 400 CAPSULE ORAL at 21:09

## 2020-06-04 NOTE — PROGRESS NOTE ADULT - PROBLEM SELECTOR PROBLEM 4
Decompensated hepatic cirrhosis
EMG (acute kidney injury)
Essential hypertension
MEG (acute kidney injury)
Type 2 diabetes mellitus with diabetic neuropathy, without long-term current use of insulin
Hyperlipidemia, unspecified hyperlipidemia type
MEG (acute kidney injury)

## 2020-06-04 NOTE — PROGRESS NOTE ADULT - ATTENDING COMMENTS
patient doing well today, no complaints    exam significant for midline healing scar and ostomy bag   no other significant findings    labs today c/w prior labs     dispo pending placement

## 2020-06-04 NOTE — ADVANCED PRACTICE NURSE CONSULT - ASSESSMENT
Pt expressed his inability to purchase meter    [facility] acquired meter  for pt( prodigy meter  from [facility])

## 2020-06-04 NOTE — PROGRESS NOTE ADULT - ATTENDING COMMENTS
Insulin doses were not increased as suggested yesterday but glucoses have been mostly in target range.  Will continue on Lantus 24/lispro 10 units AC, and probably discharge on this regimen

## 2020-06-04 NOTE — PROGRESS NOTE ADULT - PROBLEM SELECTOR PLAN 3
Type 2 DM. A1C 6.9% on 4/5.  - Lantus 24 units in the evening  - Lispro 10 units TID pre-meal   - continue mISS  - discontinued metformin  - f/u endo for final discharge recs, will do bedside teaching with glucometer/lancets    #Hypothyroidism   TSH 2.813; TPO and antithyroid Abs neg. Levothyroxine 50 mcg discontinued per endocrinology 4/29 given low concern for autoimmune hypothyroidism at this time; will assess necessity of continuation of levothyroxine.  - plan to repeat TFTs on June 9th

## 2020-06-04 NOTE — PROGRESS NOTE ADULT - PROBLEM SELECTOR PROBLEM 7
Hypertension
Diabetes
Hypertension
Hyponatremia
COVID-19 virus detected
Hypertension

## 2020-06-04 NOTE — PROGRESS NOTE ADULT - PROBLEM SELECTOR PROBLEM 3
Diabetes
Diabetes
High output ileostomy
Toxic megacolon
ETOH abuse
Diabetes

## 2020-06-04 NOTE — PROGRESS NOTE ADULT - PROBLEM SELECTOR PROBLEM 10
Prophylactic measure

## 2020-06-04 NOTE — PROGRESS NOTE ADULT - PROBLEM SELECTOR PLAN 1
S/p ex lap with subtotal colectomy+ileostomy on 3/31. Course c/b fever and purulent wound infection (4/9) s/p vanc/zosyn, now resolved. No melena in ostomy for multiple days.  - continue Loperamide 2 mg daily (will be able to get 7 days of meds on d/c) + Loperamide 2 mg PRN (has not required)  - wound care

## 2020-06-04 NOTE — PROGRESS NOTE ADULT - SUBJECTIVE AND OBJECTIVE BOX
INTERVAL HPI/OVERNIGHT EVENTS:    Patient is a 60y old  Male who presents with a chief complaint of COVID (25 May 2020 08:33)      Pt reports the following symptoms:    CONSTITUTIONAL:  Negative fever or chills, feels well, good appetite  EYES:  Negative  blurry vision or double vision  CARDIOVASCULAR:  Negative for chest pain or palpitations  RESPIRATORY:  Negative for cough, wheezing, or SOB   GASTROINTESTINAL:  Negative for nausea, vomiting, diarrhea, constipation, or abdominal pain  GENITOURINARY:  Negative frequency, urgency or dysuria  NEUROLOGIC:  No headache, confusion, dizziness, lightheadedness    MEDICATIONS  (STANDING):  amLODIPine   Tablet 10 milliGRAM(s) Oral daily  AQUAPHOR (petrolatum Ointment) 1 Application(s) Topical two times a day  atorvastatin 10 milliGRAM(s) Oral at bedtime  baclofen 2.5 milliGRAM(s) Oral every 12 hours  brimonidine 0.2% Ophthalmic Solution 1 Drop(s) Both EYES three times a day  dextrose 5%. 1000 milliLiter(s) (50 mL/Hr) IV Continuous <Continuous>  dextrose 50% Injectable 12.5 Gram(s) IV Push once  dextrose 50% Injectable 25 Gram(s) IV Push once  dextrose 50% Injectable 25 Gram(s) IV Push once  dorzolamide 2% Ophthalmic Solution 1 Drop(s) Both EYES three times a day  gabapentin 300 milliGRAM(s) Oral every 8 hours  heparin   Injectable 7500 Unit(s) SubCutaneous every 8 hours  insulin glargine Injectable (LANTUS) 24 Unit(s) SubCutaneous at bedtime  insulin lispro (HumaLOG) corrective regimen sliding scale   SubCutaneous Before meals and at bedtime  insulin lispro Injectable (HumaLOG) 10 Unit(s) SubCutaneous three times a day before meals  loperamide 2 milliGRAM(s) Oral daily  pantoprazole    Tablet 40 milliGRAM(s) Oral every 12 hours  rifAXIMin 550 milliGRAM(s) Oral two times a day  sodium bicarbonate 1300 milliGRAM(s) Oral three times a day  sodium chloride 1 Gram(s) Oral three times a day  traZODone 100 milliGRAM(s) Oral at bedtime    MEDICATIONS  (PRN):  acetaminophen   Tablet .. 500 milliGRAM(s) Oral every 6 hours PRN Mild Pain (1 - 3), Moderate Pain (4 - 6)  dextrose 40% Gel 15 Gram(s) Oral once PRN Blood Glucose LESS THAN 70 milliGRAM(s)/deciliter  glucagon  Injectable 1 milliGRAM(s) IntraMuscular once PRN Glucose LESS THAN 70 milligrams/deciliter  loperamide 2 milliGRAM(s) Oral daily PRN high ostomy output      PHYSICAL EXAM  Vital Signs Last 24 Hrs  T(C): 36.9 (04 Jun 2020 10:33), Max: 36.9 (04 Jun 2020 10:33)  T(F): 98.5 (04 Jun 2020 10:33), Max: 98.5 (04 Jun 2020 10:33)  HR: 69 (04 Jun 2020 10:33) (65 - 69)  BP: 125/61 (04 Jun 2020 10:33) (125/61 - 145/60)  BP(mean): --  RR: 18 (04 Jun 2020 10:33) (17 - 18)  SpO2: 100% (04 Jun 2020 10:33) (100% - 100%)    Constitutional: wn/wd in NAD.   HEENT: NCAT, MMM, OP clear, EOMI, no proptosis or lid retraction  Neck: no thyromegaly or palpable thyroid nodules   Respiratory: lungs CTAB.  Cardiovascular: regular rhythm, normal S1 and S2, no audible murmurs, no peripheral edema  GI: soft, NT/ND, no masses/HSM appreciated.  Neurology: no tremors, DTR 2+  Skin: no visible rashes/lesions  Psychiatric: AAO x 3, normal affect/mood.    LABS:                        8.9    5.80  )-----------( 172      ( 04 Jun 2020 07:20 )             28.1     06-04    140  |  111<H>  |  18  ----------------------------<  125<H>  5.3   |  18<L>  |  1.10    Ca    9.8      04 Jun 2020 07:20  Phos  4.5     06-04  Mg     1.6     06-04          Thyroid Stimulating Hormone, Serum: 2.613 uIU/mL (05-26 @ 10:28)  Thyroid Stimulating Hormone, Serum: 2.389 uIU/mL (05-18 @ 07:38)  Thyroid Stimulating Hormone, Serum: 3.869 uIU/mL (05-13 @ 06:50)  Thyroid Stimulating Hormone, Serum: 3.104 uIU/mL (05-11 @ 06:10)  Thyroid Stimulating Hormone, Serum: 2.712 uIU/mL (05-07 @ 06:31)  Thyroid Stimulating Hormone, Serum: 3.063 uIU/mL (05-07 @ 06:31)  Thyroid Stimulating Hormone, Serum: 3.509 uIU/mL (05-03 @ 06:07)  Thyroid Stimulating Hormone, Serum: 3.325 uIU/mL (04-29 @ 06:17)  Thyroid Stimulating Hormone, Serum: 2.813 uIU/mL (04-14 @ 07:28)  Thyroid Stimulating Hormone, Serum: 2.877 uIU/mL (04-14 @ 00:34)      HbA1C: 6.9 % (04-05 @ 06:59)  7.6 % (03-31 @ 05:58)    CAPILLARY BLOOD GLUCOSE      POCT Blood Glucose.: 210 mg/dL (04 Jun 2020 11:29)  POCT Blood Glucose.: 125 mg/dL (04 Jun 2020 07:56)  POCT Blood Glucose.: 166 mg/dL (03 Jun 2020 22:19)  POCT Blood Glucose.: 127 mg/dL (03 Jun 2020 16:38)      Insulin Sliding Scale requirements X 24 Hours:    RADIOLOGY & ADDITIONAL TESTS:    A/P: 60y Male with history of DM type II presenting for       1.  DM -     Please continue           units lantus at bedtime  / in the morning and        units lispro with meals and lispro moderate / low dose sliding scale 4 times daily with meals and at bedtime.  Please continue consistent carbohydrate diet.      Goal FSG is   Will continue to monitor   For discharge, pt can continue    Pt can follow up at discharge with [facility] by calling  to make an appointment.   Will discuss case with     and update primary team INTERVAL HPI/OVERNIGHT EVENTS:    Patient is a 60y old  Male who presents with a chief complaint of COVID (25 May 2020 08:33)  Spoke to the patient  SPoke to the primary team and the nurse taking care of the patient  Saturating well on RA. Appetite is good.  He is being planned for discharge to home shelter tomorrow - he said that he wants to insulin - he is okay with continuing the insulin  Dispo pending rehab placement  On salt tabs 1gm TID. Sodium was 140 today    FSG & Insulin received:  Yesterday:  pre-dinner fs, 10 units lispro, had fish, rice, soup, 1 glucerna  bedtime fs, 24 lantus   units + 2 units lispro SS  Today:  pre-breakfast fs, 10 nutritional lispro, had banana, fruit cocktail, 2 glucerna shales, bread  pre-lunch fs     Pt reports the following symptoms:  CONSTITUTIONAL:  Negative fever or chills  CARDIOVASCULAR:  Negative for chest pain or palpitations  RESPIRATORY:  Negative for cough, wheezing, or SOB   GASTROINTESTINAL:  Negative for vomiting, diarrhea, constipation, or abdominal pain  NEUROLOGIC:  No headache, confusion, dizziness, lightheadedness    MEDICATIONS  (STANDING):  amLODIPine   Tablet 10 milliGRAM(s) Oral daily  AQUAPHOR (petrolatum Ointment) 1 Application(s) Topical two times a day  atorvastatin 10 milliGRAM(s) Oral at bedtime  baclofen 2.5 milliGRAM(s) Oral every 12 hours  brimonidine 0.2% Ophthalmic Solution 1 Drop(s) Both EYES three times a day  dextrose 5%. 1000 milliLiter(s) (50 mL/Hr) IV Continuous <Continuous>  dextrose 50% Injectable 12.5 Gram(s) IV Push once  dextrose 50% Injectable 25 Gram(s) IV Push once  dextrose 50% Injectable 25 Gram(s) IV Push once  dorzolamide 2% Ophthalmic Solution 1 Drop(s) Both EYES three times a day  gabapentin 300 milliGRAM(s) Oral every 8 hours  heparin   Injectable 7500 Unit(s) SubCutaneous every 8 hours  insulin glargine Injectable (LANTUS) 24 Unit(s) SubCutaneous at bedtime  insulin lispro (HumaLOG) corrective regimen sliding scale   SubCutaneous Before meals and at bedtime  insulin lispro Injectable (HumaLOG) 10 Unit(s) SubCutaneous three times a day before meals  loperamide 2 milliGRAM(s) Oral daily  pantoprazole    Tablet 40 milliGRAM(s) Oral every 12 hours  rifAXIMin 550 milliGRAM(s) Oral two times a day  sodium bicarbonate 1300 milliGRAM(s) Oral three times a day  sodium chloride 1 Gram(s) Oral three times a day  traZODone 100 milliGRAM(s) Oral at bedtime    MEDICATIONS  (PRN):  acetaminophen   Tablet .. 500 milliGRAM(s) Oral every 6 hours PRN Mild Pain (1 - 3), Moderate Pain (4 - 6)  dextrose 40% Gel 15 Gram(s) Oral once PRN Blood Glucose LESS THAN 70 milliGRAM(s)/deciliter  glucagon  Injectable 1 milliGRAM(s) IntraMuscular once PRN Glucose LESS THAN 70 milligrams/deciliter  loperamide 2 milliGRAM(s) Oral daily PRN high ostomy output      PHYSICAL EXAM  Vital Signs Last 24 Hrs  T(C): 36.9 (2020 10:33), Max: 36.9 (2020 10:33)  T(F): 98.5 (2020 10:33), Max: 98.5 (2020 10:33)  HR: 69 (2020 10:33) (65 - 69)  BP: 125/61 (2020 10:33) (125/61 - 145/60)  BP(mean): --  RR: 18 (2020 10:33) (17 - 18)  SpO2: 100% (2020 10:33) (100% - 100%)    Due to the nature of this patient’s COVID-19 isolation status (either confirmed or suspected), this note was prepared without a bedside physical examination to prevent spread of infection and to conserve personal protective equipment during this nationwide pandemic. If possible, direct patient communication occurred via electronic measures or telephone conversation. Examination highlights were provided by a bedside nurse wearing personal protective equipment and review of pertinent medical records. Objective data (vital signs, urine output, lab results, imaging studies, medications, etc) were reviewed in detail. Face to face visits and physical examination have been limited only to patients for whom it is required for medical decision making.    LABS:                        8.9    5.80  )-----------( 172      ( 2020 07:20 )             28.1     06-04    140  |  111<H>  |  18  ----------------------------<  125<H>  5.3   |  18<L>  |  1.10    Ca    9.8      2020 07:20  Phos  4.5     -  Mg     1.6     -          Thyroid Stimulating Hormone, Serum: 2.613 uIU/mL ( @ 10:28)  Thyroid Stimulating Hormone, Serum: 2.389 uIU/mL ( @ 07:38)  Thyroid Stimulating Hormone, Serum: 3.869 uIU/mL ( @ 06:50)  Thyroid Stimulating Hormone, Serum: 3.104 uIU/mL ( @ 06:10)  Thyroid Stimulating Hormone, Serum: 2.712 uIU/mL ( @ 06:31)  Thyroid Stimulating Hormone, Serum: 3.063 uIU/mL ( @ 06:31)  Thyroid Stimulating Hormone, Serum: 3.509 uIU/mL ( @ 06:07)  Thyroid Stimulating Hormone, Serum: 3.325 uIU/mL ( @ 06:17)  Thyroid Stimulating Hormone, Serum: 2.813 uIU/mL ( @ 07:28)  Thyroid Stimulating Hormone, Serum: 2.877 uIU/mL ( @ 00:34)      HbA1C: 6.9 % ( @ 06:59)  7.6 % ( @ 05:58)    CAPILLARY BLOOD GLUCOSE      POCT Blood Glucose.: 210 mg/dL (2020 11:29)  POCT Blood Glucose.: 125 mg/dL (2020 07:56)  POCT Blood Glucose.: 166 mg/dL (2020 22:19)  POCT Blood Glucose.: 127 mg/dL (2020 16:38)      Insulin Sliding Scale requirements X 24 Hours:    RADIOLOGY & ADDITIONAL TESTS:    A/P 60yMale with hx of DM now with COVID, worsening renal function, moderate hyperglycemia.     1.  DM: type 2  Please continue Lantus 24 units at bedtime  Please continue Lispro 10  units tid with meals.   Continue lispro moderate dose scale with meals and at bedtime.   Continue consistent carb diet  FSG Goal 100-180  GFR 40 and EF 65%    2.  Hyperlipidemia - goal LDL < 70  - on lipitor 10 mg once daily    3.  Hypothyroidism   - TSH on admission 2.8.   - levothyroxine 50mcg discontinued on  to assess necessity as there is no evidence of autoimmune hypothyroidism at this time.   - Repeat TSH 3.32 and free T4 1.03 ().   - TSH 3.5 and Free T4 1.01 on 5/3   TSH was 2.613 and Free T4 0.84 on   - repeat TFTs on       For discharge, patient can be discharged on the above basal/bolus insulin regimen for his DM. No need for any oral anti-diabetic meds at this time.    Case seen and discussed with  and updated the primary team.  Pt can follow up at discharge with [facility] by calling  to make an appointment.

## 2020-06-04 NOTE — PROGRESS NOTE ADULT - PROBLEM SELECTOR PROBLEM 5
Anemia
Essential hypertension
Type 2 diabetes mellitus with diabetic neuropathy, without long-term current use of insulin
Hypertension, unspecified type
Anemia

## 2020-06-04 NOTE — PROGRESS NOTE ADULT - PROBLEM SELECTOR PROBLEM 6
Diabetes
Decompensated hepatic cirrhosis
Decompensated hepatic cirrhosis
Diabetes
Diabetic neuropathy
Essential hypertension
Diabetes mellitus due to underlying condition with other specified complication, unspecified whether long term insulin use
Diabetic neuropathy

## 2020-06-04 NOTE — PROGRESS NOTE ADULT - SUBJECTIVE AND OBJECTIVE BOX
O/N Events: no acute events    Subjective: Patient seen at the bedside. No complaints this morning.     VITALS  Vital Signs Last 24 Hrs  T(C): 36.9 (04 Jun 2020 10:33), Max: 36.9 (04 Jun 2020 10:33)  T(F): 98.5 (04 Jun 2020 10:33), Max: 98.5 (04 Jun 2020 10:33)  HR: 69 (04 Jun 2020 10:33) (65 - 69)  BP: 125/61 (04 Jun 2020 10:33) (125/61 - 145/60)  BP(mean): --  RR: 18 (04 Jun 2020 10:33) (17 - 18)  SpO2: 100% (04 Jun 2020 10:33) (100% - 100%)    CAPILLARY BLOOD GLUCOSE      POCT Blood Glucose.: 210 mg/dL (04 Jun 2020 11:29)  POCT Blood Glucose.: 125 mg/dL (04 Jun 2020 07:56)  POCT Blood Glucose.: 166 mg/dL (03 Jun 2020 22:19)  POCT Blood Glucose.: 127 mg/dL (03 Jun 2020 16:38)      PHYSICAL EXAM  General appearance: well-appearing, found standing in room, pleasant  HEENT: MMM  Respiratory: breath sounds clear to auscultation throughout all lung fields, no accessory muscle use, no wheezing  Cardiovascular: regular rate and rhythm  Gastrointestinal: soft, non-tender, mildly distended, normoactive bowel sounds, large midline surgical scar present - healing well, small opening with crusted pus present, ostomy bag in RLQ - liquid orange/yellow output  Extremities: WWP, no lower extremity edema  Neurological: AOx3    MEDICATIONS  (STANDING):  amLODIPine   Tablet 10 milliGRAM(s) Oral daily  AQUAPHOR (petrolatum Ointment) 1 Application(s) Topical two times a day  atorvastatin 10 milliGRAM(s) Oral at bedtime  baclofen 2.5 milliGRAM(s) Oral every 12 hours  brimonidine 0.2% Ophthalmic Solution 1 Drop(s) Both EYES three times a day  dextrose 5%. 1000 milliLiter(s) (50 mL/Hr) IV Continuous <Continuous>  dextrose 50% Injectable 12.5 Gram(s) IV Push once  dextrose 50% Injectable 25 Gram(s) IV Push once  dextrose 50% Injectable 25 Gram(s) IV Push once  dorzolamide 2% Ophthalmic Solution 1 Drop(s) Both EYES three times a day  gabapentin 300 milliGRAM(s) Oral every 8 hours  heparin   Injectable 7500 Unit(s) SubCutaneous every 8 hours  insulin glargine Injectable (LANTUS) 24 Unit(s) SubCutaneous at bedtime  insulin lispro (HumaLOG) corrective regimen sliding scale   SubCutaneous Before meals and at bedtime  insulin lispro Injectable (HumaLOG) 10 Unit(s) SubCutaneous three times a day before meals  loperamide 2 milliGRAM(s) Oral daily  pantoprazole    Tablet 40 milliGRAM(s) Oral every 12 hours  rifAXIMin 550 milliGRAM(s) Oral two times a day  sodium bicarbonate 1300 milliGRAM(s) Oral three times a day  sodium chloride 1 Gram(s) Oral three times a day  traZODone 100 milliGRAM(s) Oral at bedtime    MEDICATIONS  (PRN):  acetaminophen   Tablet .. 500 milliGRAM(s) Oral every 6 hours PRN Mild Pain (1 - 3), Moderate Pain (4 - 6)  dextrose 40% Gel 15 Gram(s) Oral once PRN Blood Glucose LESS THAN 70 milliGRAM(s)/deciliter  glucagon  Injectable 1 milliGRAM(s) IntraMuscular once PRN Glucose LESS THAN 70 milligrams/deciliter  loperamide 2 milliGRAM(s) Oral daily PRN high ostomy output      No Known Allergies      LABS                        8.9    5.80  )-----------( 172      ( 04 Jun 2020 07:20 )             28.1     06-04    140  |  111<H>  |  18  ----------------------------<  125<H>  5.3   |  18<L>  |  1.10    Ca    9.8      04 Jun 2020 07:20  Phos  4.5     06-04  Mg     1.6     06-04                PROCEDURES/IMAGING: reviewed.

## 2020-06-04 NOTE — PROGRESS NOTE ADULT - PROBLEM SELECTOR PROBLEM 2
Acute renal failure superimposed on stage 3 chronic kidney disease, unspecified acute renal failure type
CKD (chronic kidney disease)
CKD (chronic kidney disease)
Cirrhosis of liver with ascites
Anemia, unspecified type
Cirrhosis of liver with ascites

## 2020-06-04 NOTE — PROGRESS NOTE ADULT - PROBLEM SELECTOR PROBLEM 1
Acute renal failure
COVID-19
COVID-19 virus detected
Hyponatremia
MEG (acute kidney injury)
Toxic megacolon
Toxic megacolon
Crescenciocokierran
Toxic megacolon

## 2020-06-04 NOTE — PROGRESS NOTE ADULT - ASSESSMENT
60M PMH HTN, DM (w/ neuropathy), IVDU, ETOH liver cirrhosis who presented to [facility] s/p fall at home; was found with toxic megacolon and admitted to SICU; now s/p ex lap + subtotal colectomy, ileostomy (3/31). Hospital course complicated by COVID, MEG, and anemia with suspicion for GI source/varices (refused further GI workup). Now stable for discharge and will be placed back at shelter tomorrow. COVID negative on 5/23.

## 2020-06-04 NOTE — ADVANCED PRACTICE NURSE CONSULT - RECOMMEDATIONS
Pt was provided with instructions on the use of his new prodigy meter.    Pt demonstrated, while verbalizing, his understanding on the use of his new Prodigy meter.  Pt agreed to do SBGM as per his provider's instructions.

## 2020-06-05 ENCOUNTER — TRANSCRIPTION ENCOUNTER (OUTPATIENT)
Age: 60
End: 2020-06-05

## 2020-06-05 VITALS
OXYGEN SATURATION: 100 % | TEMPERATURE: 98 F | SYSTOLIC BLOOD PRESSURE: 135 MMHG | RESPIRATION RATE: 18 BRPM | DIASTOLIC BLOOD PRESSURE: 69 MMHG | HEART RATE: 70 BPM

## 2020-06-05 LAB
GLUCOSE BLDC GLUCOMTR-MCNC: 111 MG/DL — HIGH (ref 70–99)
GLUCOSE BLDC GLUCOMTR-MCNC: 129 MG/DL — HIGH (ref 70–99)

## 2020-06-05 PROCEDURE — 99239 HOSP IP/OBS DSCHRG MGMT >30: CPT | Mod: GC

## 2020-06-05 RX ORDER — SITAGLIPTIN AND METFORMIN HYDROCHLORIDE 500; 50 MG/1; MG/1
1 TABLET, FILM COATED ORAL
Qty: 0 | Refills: 0 | DISCHARGE

## 2020-06-05 RX ORDER — ENOXAPARIN SODIUM 100 MG/ML
24 INJECTION SUBCUTANEOUS
Qty: 1 | Refills: 0
Start: 2020-06-05 | End: 2020-06-11

## 2020-06-05 RX ORDER — INSULIN LISPRO 100/ML
10 VIAL (ML) SUBCUTANEOUS
Qty: 1 | Refills: 0
Start: 2020-06-05 | End: 2020-06-11

## 2020-06-05 RX ADMIN — AMLODIPINE BESYLATE 10 MILLIGRAM(S): 2.5 TABLET ORAL at 06:34

## 2020-06-05 RX ADMIN — DORZOLAMIDE HYDROCHLORIDE 1 DROP(S): 20 SOLUTION/ DROPS OPHTHALMIC at 06:12

## 2020-06-05 RX ADMIN — INSULIN GLARGINE 24 UNIT(S): 100 INJECTION, SOLUTION SUBCUTANEOUS at 00:04

## 2020-06-05 RX ADMIN — HEPARIN SODIUM 7500 UNIT(S): 5000 INJECTION INTRAVENOUS; SUBCUTANEOUS at 06:34

## 2020-06-05 RX ADMIN — BRIMONIDINE TARTRATE 1 DROP(S): 2 SOLUTION/ DROPS OPHTHALMIC at 06:12

## 2020-06-05 RX ADMIN — Medication 2.5 MILLIGRAM(S): at 06:33

## 2020-06-05 RX ADMIN — GABAPENTIN 300 MILLIGRAM(S): 400 CAPSULE ORAL at 06:34

## 2020-06-05 RX ADMIN — Medication 10 UNIT(S): at 08:56

## 2020-06-05 RX ADMIN — Medication 10 UNIT(S): at 12:05

## 2020-06-05 RX ADMIN — Medication 1300 MILLIGRAM(S): at 06:34

## 2020-06-05 RX ADMIN — Medication 500 MILLIGRAM(S): at 12:07

## 2020-06-05 RX ADMIN — Medication 1 APPLICATION(S): at 06:11

## 2020-06-05 RX ADMIN — SODIUM CHLORIDE 1 GRAM(S): 9 INJECTION INTRAMUSCULAR; INTRAVENOUS; SUBCUTANEOUS at 06:34

## 2020-06-05 RX ADMIN — PANTOPRAZOLE SODIUM 40 MILLIGRAM(S): 20 TABLET, DELAYED RELEASE ORAL at 06:37

## 2020-06-05 NOTE — DISCHARGE NOTE NURSING/CASE MANAGEMENT/SOCIAL WORK - NSDCFUADDAPPT_GEN_ALL_CORE_FT
The [facility] was contacted so that you can follow up with a Primary Care Physician when you leave the hospital.  You have a telehealth appointment scheduled for Thursday, June 18th at 1:45 PM.    Please follow up with outpatient hepatology/gastroenterology to further work up your cirrhosis and possible bleeding.    Please follow up with Dr. Wakefield (Surgery) after your discharge.

## 2020-06-05 NOTE — DISCHARGE NOTE NURSING/CASE MANAGEMENT/SOCIAL WORK - PATIENT PORTAL LINK FT
You can access the FollowMyHealth Patient Portal offered by [facility] by registering at the following website: [email or web address]. By joining [employer]’s FollowMyHealth portal, you will also be able to view your health information using other applications (apps) compatible with our system.

## 2020-06-05 NOTE — ADVANCED PRACTICE NURSE CONSULT - ASSESSMENT
Pt with 2 surgical wounds to midline of abdomen, inferior wound completely healed, scar tissue covering site. Superior wound now with small scabs over length of wound, no erythema or drainage noted, pt denies pain, prefers to not have dressing to cover. Pt provided with Angi 1 piece ostomy appliances as well as 2 piece Houck 2 3/4" appliances, stoma rings, and skin barrier wipes for discharge.

## 2020-06-08 DIAGNOSIS — E87.1 HYPO-OSMOLALITY AND HYPONATREMIA: ICD-10-CM

## 2020-06-08 DIAGNOSIS — Z79.82 LONG TERM (CURRENT) USE OF ASPIRIN: ICD-10-CM

## 2020-06-08 DIAGNOSIS — J12.89 OTHER VIRAL PNEUMONIA: ICD-10-CM

## 2020-06-08 DIAGNOSIS — E11.65 TYPE 2 DIABETES MELLITUS WITH HYPERGLYCEMIA: ICD-10-CM

## 2020-06-08 DIAGNOSIS — D63.8 ANEMIA IN OTHER CHRONIC DISEASES CLASSIFIED ELSEWHERE: ICD-10-CM

## 2020-06-08 DIAGNOSIS — K72.90 HEPATIC FAILURE, UNSPECIFIED WITHOUT COMA: ICD-10-CM

## 2020-06-08 DIAGNOSIS — R10.9 UNSPECIFIED ABDOMINAL PAIN: ICD-10-CM

## 2020-06-08 DIAGNOSIS — N17.0 ACUTE KIDNEY FAILURE WITH TUBULAR NECROSIS: ICD-10-CM

## 2020-06-08 DIAGNOSIS — E83.42 HYPOMAGNESEMIA: ICD-10-CM

## 2020-06-08 DIAGNOSIS — E87.5 HYPERKALEMIA: ICD-10-CM

## 2020-06-08 DIAGNOSIS — E11.22 TYPE 2 DIABETES MELLITUS WITH DIABETIC CHRONIC KIDNEY DISEASE: ICD-10-CM

## 2020-06-08 DIAGNOSIS — E03.9 HYPOTHYROIDISM, UNSPECIFIED: ICD-10-CM

## 2020-06-08 DIAGNOSIS — D62 ACUTE POSTHEMORRHAGIC ANEMIA: ICD-10-CM

## 2020-06-08 DIAGNOSIS — K74.60 UNSPECIFIED CIRRHOSIS OF LIVER: ICD-10-CM

## 2020-06-08 DIAGNOSIS — E11.21 TYPE 2 DIABETES MELLITUS WITH DIABETIC NEPHROPATHY: ICD-10-CM

## 2020-06-08 DIAGNOSIS — E78.5 HYPERLIPIDEMIA, UNSPECIFIED: ICD-10-CM

## 2020-06-08 DIAGNOSIS — D68.69 OTHER THROMBOPHILIA: ICD-10-CM

## 2020-06-08 DIAGNOSIS — K35.80 UNSPECIFIED ACUTE APPENDICITIS: ICD-10-CM

## 2020-06-08 DIAGNOSIS — U07.1 COVID-19: ICD-10-CM
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
## 2020-06-08 DIAGNOSIS — F32.9 MAJOR DEPRESSIVE DISORDER, SINGLE EPISODE, UNSPECIFIED: ICD-10-CM

## 2020-06-08 DIAGNOSIS — K59.31 TOXIC MEGACOLON: ICD-10-CM

## 2020-06-08 DIAGNOSIS — K70.40 ALCOHOLIC HEPATIC FAILURE WITHOUT COMA: ICD-10-CM

## 2020-06-08 DIAGNOSIS — E87.2 ACIDOSIS: ICD-10-CM

## 2020-06-08 DIAGNOSIS — E66.9 OBESITY, UNSPECIFIED: ICD-10-CM

## 2020-06-08 DIAGNOSIS — E86.1 HYPOVOLEMIA: ICD-10-CM
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
## 2020-06-08 DIAGNOSIS — K66.8 OTHER SPECIFIED DISORDERS OF PERITONEUM: ICD-10-CM

## 2020-06-08 DIAGNOSIS — Z79.899 OTHER LONG TERM (CURRENT) DRUG THERAPY: ICD-10-CM

## 2020-06-08 DIAGNOSIS — N18.3 CHRONIC KIDNEY DISEASE, STAGE 3 (MODERATE): ICD-10-CM

## 2020-06-08 DIAGNOSIS — K56.7 ILEUS, UNSPECIFIED: ICD-10-CM

## 2020-06-08 DIAGNOSIS — E11.10 TYPE 2 DIABETES MELLITUS WITH KETOACIDOSIS WITHOUT COMA: ICD-10-CM

## 2020-06-08 DIAGNOSIS — I12.9 HYPERTENSIVE CHRONIC KIDNEY DISEASE WITH STAGE 1 THROUGH STAGE 4 CHRONIC KIDNEY DISEASE, OR UNSPECIFIED CHRONIC KIDNEY DISEASE: ICD-10-CM

## 2020-06-08 DIAGNOSIS — R18.8 OTHER ASCITES: ICD-10-CM

## 2020-06-08 DIAGNOSIS — R45.851 SUICIDAL IDEATIONS: ICD-10-CM

## 2020-06-15 PROBLEM — Z00.00 ENCOUNTER FOR PREVENTIVE HEALTH EXAMINATION: Status: ACTIVE | Noted: 2020-06-15

## 2020-06-18 ENCOUNTER — APPOINTMENT (OUTPATIENT)
Dept: INTERNAL MEDICINE | Facility: CLINIC | Age: 60
End: 2020-06-18
Payer: MEDICAID

## 2020-06-18 DIAGNOSIS — Z93.9 ARTIFICIAL OPENING STATUS, UNSPECIFIED: ICD-10-CM

## 2020-06-18 DIAGNOSIS — E11.9 TYPE 2 DIABETES MELLITUS W/OUT COMPLICATIONS: ICD-10-CM

## 2020-06-18 DIAGNOSIS — K74.60 UNSPECIFIED CIRRHOSIS OF LIVER: ICD-10-CM

## 2020-06-18 DIAGNOSIS — Z90.49 ACQUIRED ABSENCE OF OTHER SPECIFIED PARTS OF DIGESTIVE TRACT: ICD-10-CM

## 2020-06-18 PROCEDURE — 99495 TRANSJ CARE MGMT MOD F2F 14D: CPT | Mod: GC

## 2020-06-18 NOTE — ASSESSMENT
[FreeTextEntry1] : 60M with PMH DM. toxic megacolon s/p subtotal collectomy + ileostomy, cirrhosis, COVID-19 who presents for post discharge follow up.\par \par #DM\par Pt has been receiving help with his insulin from shelter clinic. States his finger sticks have been approx 170s. Has been taking insulin regularly and monitoring his blood glucose.\par \par #subtotal collectomy + ileostomy\par Pt has PCP at [facility] (Dr. Pham) who scheduled f/u visit with [facility] surgeon. Pt concerned about not having sufficient ostomy bags. His shelter nurse ordered new bags for him which are due to arrive this week. Counseled him to present to [facility] ED if he runs out of bags.\par \par #cirrhosis\par Counseled pt to follow up with GI at [facility].\par \par #HCM\par Pt has PCP Dr. Pham and does not need to follow up at [facility].

## 2020-06-18 NOTE — END OF VISIT
[FreeTextEntry3] : I was present with the Resident during the key portions of this encounter and provided supervision via audio  technology.  I agree with the findings and plan as documented in the Resident's note, unless noted below. \par \par 60M with recent prolonged hospitalization. Lives in a shelter, is working w/ a nurse there to obtain ostomy supplies. Appears to be in good spirits on the phone, asks Dr. Mazariegos to send his regards to the other providers who cared for him during his hospitalization. Is due for post-op follow up in surgery clinic w/ Dr. Wakefield, but did not receive appointment. Pt has a PCP who has referred him to surgery clinic at [place] for f/u of ostomy. We also advised GI follow up at [facility] for cirrhosis. Pt in agreement with plan, will obtain ongoing care at [facility].

## 2020-06-18 NOTE — ASSESSMENT
[FreeTextEntry1] : 60M with PMH DM. toxic megacolon s/p subtotal collectomy + ileostomy, cirrhosis, COVID-19 who presents for post discharge follow up.\par \par #DM\par Pt has been receiving help with his insulin from shelter clinic. States his finger sticks have been approx 170s. Has been taking insulin regularly and monitoring his blood glucose.\par \par #subtotal collectomy + ileostomy\par Pt has PCP at [facility] (Dr. Pham) who scheduled f/u visit with [facility] surgeon. Pt concerned about not having sufficient ostomy bags. His shelter nurse ordered new bags for him which are due to arrive this week. Counseled him to present to [facility] ED if he runs out of bags.\par \par #cirrhosis\par Counseled pt to follow up with GI at [facility].\par \par #HCM\par Pt has PCP Dr. Pham and does not need to follow up at [facility].

## 2020-06-18 NOTE — HISTORY OF PRESENT ILLNESS
[de-identified] : STUART DE LOS SANTOS, an established patient at this office, reached out to this provider for [ ]. No recent visit with the last 7 days. A visit is\par not scheduled within the next 7 days at this time.\par Discussed with patient: You have chosen to receive care through the use of tele-media. Tele-media enables health care\par providers at different locations to provide safe, effective and convenient care through the use of technology. Please note that\par this is a billable encounter. As with any health care service, there are risks associated with the use of tele-media, including\par equipment failure, poor image and/or resolution, and  issues. You understand that I cannot physically\par examine you and that you may need to come to the clinic to complete the assessment. Patient agreed verbally to\par understanding the risks and benefits of tele-media as explained. All questions regarding tele-media encounters were\par answered.\par Total time spent with the patient on the phone was [ ].\par \par concerned because needs new ostomy bad [Verbal consent obtained from patient] : the patient, [unfilled] [Medical Office: ([facility])___] : at the medical office located in  [Home] : at home, [unfilled] , at the time of the visit. [Post-hospitalization from ___ Hospital] : Post-hospitalization from [unfilled] Hospital [Admitted on: ___] : The patient was admitted on [unfilled] [Discharged on ___] : discharged on [unfilled] [Patient Contacted By: ____] : and contacted by [unfilled] [FreeTextEntry2] : 60M with PMH DM. toxic megacolon s/p subtotal collectomy + ileostomy, cirrhosis, COVID-19 who presents for post discharge follow up. Pt was discharged to the shelter where he has resided for approx 30 yrs and states that he has been receiving help managing his insulin and ostomy care. He was concerned about not having sufficient ostomy bags but denies any other health complaints. Pt has PCP at [facility] who he has seen since discharge from [facility]. Denies HA, dizziness, fever, chills, ortega/vom, change in bowel or urinary habits.

## 2020-06-18 NOTE — PHYSICAL EXAM
[No Respiratory Distress] : no respiratory distress  [No Acute Distress] : no acute distress [Alert and Oriented x3] : oriented to person, place, and time [Normal Affect] : the affect was normal

## 2020-06-18 NOTE — HISTORY OF PRESENT ILLNESS
[de-identified] : STUART DE LOS SANTOS, an established patient at this office, reached out to this provider for [ ]. No recent visit with the last 7 days. A visit is\par not scheduled within the next 7 days at this time.\par Discussed with patient: You have chosen to receive care through the use of tele-media. Tele-media enables health care\par providers at different locations to provide safe, effective and convenient care through the use of technology. Please note that\par this is a billable encounter. As with any health care service, there are risks associated with the use of tele-media, including\par equipment failure, poor image and/or resolution, and  issues. You understand that I cannot physically\par examine you and that you may need to come to the clinic to complete the assessment. Patient agreed verbally to\par understanding the risks and benefits of tele-media as explained. All questions regarding tele-media encounters were\par answered.\par Total time spent with the patient on the phone was [ ].\par \par concerned because needs new ostomy bad [Verbal consent obtained from patient] : the patient, [unfilled] [Home] : at home, [unfilled] , at the time of the visit. [Medical Office: ([facility])___] : at the medical office located in  [Post-hospitalization from ___ Hospital] : Post-hospitalization from [unfilled] Hospital [Admitted on: ___] : The patient was admitted on [unfilled] [Discharged on ___] : discharged on [unfilled] [Patient Contacted By: ____] : and contacted by [unfilled] [FreeTextEntry2] : 60M with PMH DM. toxic megacolon s/p subtotal collectomy + ileostomy, cirrhosis, COVID-19 who presents for post discharge follow up. Pt was discharged to the shelter where he has resided for approx 30 yrs and states that he has been receiving help managing his insulin and ostomy care. He was concerned about not having sufficient ostomy bags but denies any other health complaints. Pt has PCP at [facility] who he has seen since discharge from [facility]. Denies HA, dizziness, fever, chills, ortega/vom, change in bowel or urinary habits.

## 2020-06-18 NOTE — PHYSICAL EXAM
[No Acute Distress] : no acute distress [No Respiratory Distress] : no respiratory distress  [Alert and Oriented x3] : oriented to person, place, and time [Normal Affect] : the affect was normal

## 2020-06-18 NOTE — END OF VISIT
[FreeTextEntry3] : I was present with the Resident during the key portions of this encounter and provided supervision via audio  technology.  I agree with the findings and plan as documented in the Resident's note, unless noted below. \par \par 60M with recent prolonged hospitalization. Lives in a shelter, is working w/ a nurse there to obtain ostomy supplies. Appears to be in good spirits on the phone, asks Dr. Mazariegos to send his regards to the other providers who cared for him during his hospitalization. Is due for post-op follow up in surgery clinic w/ Dr. Wakefield, but did not receive appointment. Pt has a PCP who has referred him to surgery clinic at [place] for f/u of ostomy. We also advised GI follow up at [place] for cirrhosis. Pt in agreement with plan, will obtain ongoing care at [place].

## 2020-08-19 NOTE — PROGRESS NOTE ADULT - PROVIDER SPECIALTY LIST ADULT
Internal Medicine Detail Level: Detailed Samples Given: arazlo x 4 apply QD Plan: have OB test for PCOS

## 2020-08-25 NOTE — PROVIDER CONTACT NOTE (OTHER) - DATE AND TIME:
15-Apr-2020 07:53
18-Apr-2020 20:25
18-Apr-2020 23:55
19-Apr-2020 01:10
26-Apr-2020 00:49
Alert and oriented to person, place and time

## 2020-08-31 NOTE — PROGRESS NOTE ADULT - PROBLEM SELECTOR PLAN 8
Home regimen: amlodipine 10mg qd, HCTZ, Lisinopril  -continue amlodipine 10mg QD  -hold HCTZ Lisinopril in setting of MEG  -monitor BP    #HLD  Home regimen: pravastatin 40mg qhs  -continue statin as therapeutic interchange for atorvastatin The patient has been re-examined and I agree with the above assessment or I updated with my findings.

## 2020-10-29 PROCEDURE — 83880 ASSAY OF NATRIURETIC PEPTIDE: CPT

## 2020-10-29 PROCEDURE — 74176 CT ABD & PELVIS W/O CONTRAST: CPT

## 2020-10-29 PROCEDURE — 99285 EMERGENCY DEPT VISIT HI MDM: CPT | Mod: 25

## 2020-10-29 PROCEDURE — 70450 CT HEAD/BRAIN W/O DYE: CPT

## 2020-10-29 PROCEDURE — 82040 ASSAY OF SERUM ALBUMIN: CPT

## 2020-10-29 PROCEDURE — 84145 PROCALCITONIN (PCT): CPT

## 2020-10-29 PROCEDURE — 74018 RADEX ABDOMEN 1 VIEW: CPT

## 2020-10-29 PROCEDURE — 85379 FIBRIN DEGRADATION QUANT: CPT

## 2020-10-29 PROCEDURE — 85018 HEMOGLOBIN: CPT

## 2020-10-29 PROCEDURE — 82607 VITAMIN B-12: CPT

## 2020-10-29 PROCEDURE — 85025 COMPLETE CBC W/AUTO DIFF WBC: CPT

## 2020-10-29 PROCEDURE — 81001 URINALYSIS AUTO W/SCOPE: CPT

## 2020-10-29 PROCEDURE — 83930 ASSAY OF BLOOD OSMOLALITY: CPT

## 2020-10-29 PROCEDURE — 96361 HYDRATE IV INFUSION ADD-ON: CPT

## 2020-10-29 PROCEDURE — 86803 HEPATITIS C AB TEST: CPT

## 2020-10-29 PROCEDURE — 87449 NOS EACH ORGANISM AG IA: CPT

## 2020-10-29 PROCEDURE — 83615 LACTATE (LD) (LDH) ENZYME: CPT

## 2020-10-29 PROCEDURE — 84157 ASSAY OF PROTEIN OTHER: CPT

## 2020-10-29 PROCEDURE — 82746 ASSAY OF FOLIC ACID SERUM: CPT

## 2020-10-29 PROCEDURE — 85730 THROMBOPLASTIN TIME PARTIAL: CPT

## 2020-10-29 PROCEDURE — 87324 CLOSTRIDIUM AG IA: CPT

## 2020-10-29 PROCEDURE — 88307 TISSUE EXAM BY PATHOLOGIST: CPT

## 2020-10-29 PROCEDURE — 82042 OTHER SOURCE ALBUMIN QUAN EA: CPT

## 2020-10-29 PROCEDURE — C1729: CPT

## 2020-10-29 PROCEDURE — 95700 EEG CONT REC W/VID EEG TECH: CPT

## 2020-10-29 PROCEDURE — 86800 THYROGLOBULIN ANTIBODY: CPT

## 2020-10-29 PROCEDURE — 82728 ASSAY OF FERRITIN: CPT

## 2020-10-29 PROCEDURE — 86850 RBC ANTIBODY SCREEN: CPT

## 2020-10-29 PROCEDURE — 85610 PROTHROMBIN TIME: CPT

## 2020-10-29 PROCEDURE — 95716 VEEG EA 12-26HR CONT MNTR: CPT

## 2020-10-29 PROCEDURE — 93970 EXTREMITY STUDY: CPT

## 2020-10-29 PROCEDURE — 36430 TRANSFUSION BLD/BLD COMPNT: CPT

## 2020-10-29 PROCEDURE — 84432 ASSAY OF THYROGLOBULIN: CPT

## 2020-10-29 PROCEDURE — 87070 CULTURE OTHR SPECIMN AEROBIC: CPT

## 2020-10-29 PROCEDURE — 87507 IADNA-DNA/RNA PROBE TQ 12-25: CPT

## 2020-10-29 PROCEDURE — 82010 KETONE BODYS QUAN: CPT

## 2020-10-29 PROCEDURE — 88305 TISSUE EXAM BY PATHOLOGIST: CPT

## 2020-10-29 PROCEDURE — 43753 TX GASTRO INTUB W/ASP: CPT

## 2020-10-29 PROCEDURE — 74177 CT ABD & PELVIS W/CONTRAST: CPT

## 2020-10-29 PROCEDURE — 87205 SMEAR GRAM STAIN: CPT

## 2020-10-29 PROCEDURE — 84156 ASSAY OF PROTEIN URINE: CPT

## 2020-10-29 PROCEDURE — C1769: CPT

## 2020-10-29 PROCEDURE — 93306 TTE W/DOPPLER COMPLETE: CPT

## 2020-10-29 PROCEDURE — 86334 IMMUNOFIX E-PHORESIS SERUM: CPT

## 2020-10-29 PROCEDURE — 84100 ASSAY OF PHOSPHORUS: CPT

## 2020-10-29 PROCEDURE — 85652 RBC SED RATE AUTOMATED: CPT

## 2020-10-29 PROCEDURE — 94002 VENT MGMT INPAT INIT DAY: CPT

## 2020-10-29 PROCEDURE — 86140 C-REACTIVE PROTEIN: CPT

## 2020-10-29 PROCEDURE — P9047: CPT

## 2020-10-29 PROCEDURE — 84300 ASSAY OF URINE SODIUM: CPT

## 2020-10-29 PROCEDURE — 87075 CULTR BACTERIA EXCEPT BLOOD: CPT

## 2020-10-29 PROCEDURE — 84295 ASSAY OF SERUM SODIUM: CPT

## 2020-10-29 PROCEDURE — 84443 ASSAY THYROID STIM HORMONE: CPT

## 2020-10-29 PROCEDURE — 82955 ASSAY OF G6PD ENZYME: CPT

## 2020-10-29 PROCEDURE — 84540 ASSAY OF URINE/UREA-N: CPT

## 2020-10-29 PROCEDURE — 82436 ASSAY OF URINE CHLORIDE: CPT

## 2020-10-29 PROCEDURE — 84550 ASSAY OF BLOOD/URIC ACID: CPT

## 2020-10-29 PROCEDURE — 36415 COLL VENOUS BLD VENIPUNCTURE: CPT

## 2020-10-29 PROCEDURE — 86880 COOMBS TEST DIRECT: CPT

## 2020-10-29 PROCEDURE — 97162 PT EVAL MOD COMPLEX 30 MIN: CPT

## 2020-10-29 PROCEDURE — 89051 BODY FLUID CELL COUNT: CPT

## 2020-10-29 PROCEDURE — 97110 THERAPEUTIC EXERCISES: CPT

## 2020-10-29 PROCEDURE — 88112 CYTOPATH CELL ENHANCE TECH: CPT

## 2020-10-29 PROCEDURE — 85045 AUTOMATED RETICULOCYTE COUNT: CPT

## 2020-10-29 PROCEDURE — 87040 BLOOD CULTURE FOR BACTERIA: CPT

## 2020-10-29 PROCEDURE — 87186 SC STD MICRODIL/AGAR DIL: CPT

## 2020-10-29 PROCEDURE — 84133 ASSAY OF URINE POTASSIUM: CPT

## 2020-10-29 PROCEDURE — 84466 ASSAY OF TRANSFERRIN: CPT

## 2020-10-29 PROCEDURE — 71250 CT THORAX DX C-: CPT

## 2020-10-29 PROCEDURE — 96375 TX/PRO/DX INJ NEW DRUG ADDON: CPT | Mod: XU

## 2020-10-29 PROCEDURE — 96365 THER/PROPH/DIAG IV INF INIT: CPT | Mod: XU

## 2020-10-29 PROCEDURE — 84132 ASSAY OF SERUM POTASSIUM: CPT

## 2020-10-29 PROCEDURE — 82330 ASSAY OF CALCIUM: CPT

## 2020-10-29 PROCEDURE — 87102 FUNGUS ISOLATION CULTURE: CPT

## 2020-10-29 PROCEDURE — P9016: CPT

## 2020-10-29 PROCEDURE — 97116 GAIT TRAINING THERAPY: CPT

## 2020-10-29 PROCEDURE — 80076 HEPATIC FUNCTION PANEL: CPT

## 2020-10-29 PROCEDURE — 76775 US EXAM ABDO BACK WALL LIM: CPT

## 2020-10-29 PROCEDURE — 83540 ASSAY OF IRON: CPT

## 2020-10-29 PROCEDURE — 83516 IMMUNOASSAY NONANTIBODY: CPT

## 2020-10-29 PROCEDURE — 86376 MICROSOMAL ANTIBODY EACH: CPT

## 2020-10-29 PROCEDURE — 76705 ECHO EXAM OF ABDOMEN: CPT

## 2020-10-29 PROCEDURE — 86923 COMPATIBILITY TEST ELECTRIC: CPT

## 2020-10-29 PROCEDURE — 82945 GLUCOSE OTHER FLUID: CPT

## 2020-10-29 PROCEDURE — 82570 ASSAY OF URINE CREATININE: CPT

## 2020-10-29 PROCEDURE — 86480 TB TEST CELL IMMUN MEASURE: CPT

## 2020-10-29 PROCEDURE — 94760 N-INVAS EAR/PLS OXIMETRY 1: CPT

## 2020-10-29 PROCEDURE — 80053 COMPREHEN METABOLIC PANEL: CPT

## 2020-10-29 PROCEDURE — 84681 ASSAY OF C-PEPTIDE: CPT

## 2020-10-29 PROCEDURE — 83605 ASSAY OF LACTIC ACID: CPT

## 2020-10-29 PROCEDURE — 84484 ASSAY OF TROPONIN QUANT: CPT

## 2020-10-29 PROCEDURE — 86901 BLOOD TYPING SEROLOGIC RH(D): CPT

## 2020-10-29 PROCEDURE — 97530 THERAPEUTIC ACTIVITIES: CPT

## 2020-10-29 PROCEDURE — 80048 BASIC METABOLIC PNL TOTAL CA: CPT

## 2020-10-29 PROCEDURE — 83550 IRON BINDING TEST: CPT

## 2020-10-29 PROCEDURE — 83010 ASSAY OF HAPTOGLOBIN QUANT: CPT

## 2020-10-29 PROCEDURE — 93005 ELECTROCARDIOGRAM TRACING: CPT

## 2020-10-29 PROCEDURE — 82803 BLOOD GASES ANY COMBINATION: CPT

## 2020-10-29 PROCEDURE — 82140 ASSAY OF AMMONIA: CPT

## 2020-10-29 PROCEDURE — 83521 IG LIGHT CHAINS FREE EACH: CPT

## 2020-10-29 PROCEDURE — 74019 RADEX ABDOMEN 2 VIEWS: CPT

## 2020-10-29 PROCEDURE — 85027 COMPLETE CBC AUTOMATED: CPT

## 2020-10-29 PROCEDURE — 49083 ABD PARACENTESIS W/IMAGING: CPT

## 2020-10-29 PROCEDURE — 82962 GLUCOSE BLOOD TEST: CPT

## 2020-10-29 PROCEDURE — 84155 ASSAY OF PROTEIN SERUM: CPT

## 2020-10-29 PROCEDURE — 83935 ASSAY OF URINE OSMOLALITY: CPT

## 2020-10-29 PROCEDURE — 84439 ASSAY OF FREE THYROXINE: CPT

## 2020-10-29 PROCEDURE — 84165 PROTEIN E-PHORESIS SERUM: CPT

## 2020-10-29 PROCEDURE — 83690 ASSAY OF LIPASE: CPT

## 2020-10-29 PROCEDURE — 83036 HEMOGLOBIN GLYCOSYLATED A1C: CPT

## 2020-10-29 PROCEDURE — 87635 SARS-COV-2 COVID-19 AMP PRB: CPT

## 2020-10-29 PROCEDURE — 71045 X-RAY EXAM CHEST 1 VIEW: CPT

## 2020-10-29 PROCEDURE — U0003: CPT

## 2020-10-29 PROCEDURE — 83735 ASSAY OF MAGNESIUM: CPT

## 2020-10-29 PROCEDURE — C1889: CPT

## 2021-07-27 ENCOUNTER — INPATIENT (INPATIENT)
Facility: HOSPITAL | Age: 61
LOS: 4 days | Discharge: HOME | DRG: 683 | End: 2021-08-01
Attending: HOSPITALIST
Payer: MEDICAID

## 2021-07-27 VITALS
RESPIRATION RATE: 16 BRPM | HEIGHT: 67 IN | DIASTOLIC BLOOD PRESSURE: 68 MMHG | TEMPERATURE: 98 F | SYSTOLIC BLOOD PRESSURE: 141 MMHG | OXYGEN SATURATION: 98 % | HEART RATE: 44 BPM

## 2021-07-27 LAB
ALBUMIN SERPL ELPH-MCNC: 4.9 G/DL — SIGNIFICANT CHANGE UP (ref 3.3–5)
ALP SERPL-CCNC: 106 U/L — SIGNIFICANT CHANGE UP (ref 40–120)
ALT FLD-CCNC: 15 U/L — SIGNIFICANT CHANGE UP (ref 10–45)
ANION GAP SERPL CALC-SCNC: 10 MMOL/L — SIGNIFICANT CHANGE UP (ref 5–17)
APTT BLD: 30.9 SEC — SIGNIFICANT CHANGE UP (ref 27.5–35.5)
AST SERPL-CCNC: 27 U/L — SIGNIFICANT CHANGE UP (ref 10–40)
BASE EXCESS BLDV CALC-SCNC: -8.7 MMOL/L — LOW (ref -2–3)
BASOPHILS # BLD AUTO: 0.02 K/UL — SIGNIFICANT CHANGE UP (ref 0–0.2)
BASOPHILS NFR BLD AUTO: 0.2 % — SIGNIFICANT CHANGE UP (ref 0–2)
BILIRUB SERPL-MCNC: 0.3 MG/DL — SIGNIFICANT CHANGE UP (ref 0.2–1.2)
BUN SERPL-MCNC: 35 MG/DL — HIGH (ref 7–23)
CA-I SERPL-SCNC: 1.22 MMOL/L — SIGNIFICANT CHANGE UP (ref 1.15–1.33)
CALCIUM SERPL-MCNC: 9.7 MG/DL — SIGNIFICANT CHANGE UP (ref 8.4–10.5)
CALCIUM SERPL-MCNC: 9.8 MG/DL — SIGNIFICANT CHANGE UP (ref 8.4–10.5)
CHLORIDE SERPL-SCNC: 83 MMOL/L — LOW (ref 96–108)
CK MB CFR SERPL CALC: 8.9 NG/ML — HIGH (ref 0–6.7)
CK SERPL-CCNC: 553 U/L — HIGH (ref 30–200)
CO2 BLDV-SCNC: 19.7 MMOL/L — LOW (ref 22–26)
CO2 SERPL-SCNC: 19 MMOL/L — LOW (ref 22–31)
CREAT SERPL-MCNC: 1.78 MG/DL — HIGH (ref 0.5–1.3)
EOSINOPHIL # BLD AUTO: 0.16 K/UL — SIGNIFICANT CHANGE UP (ref 0–0.5)
EOSINOPHIL NFR BLD AUTO: 1.6 % — SIGNIFICANT CHANGE UP (ref 0–6)
ETHANOL SERPL-MCNC: <10 MG/DL — SIGNIFICANT CHANGE UP (ref 0–10)
GAS PNL BLDV: 113 MMOL/L — CRITICAL LOW (ref 136–145)
GAS PNL BLDV: SIGNIFICANT CHANGE UP
GLUCOSE SERPL-MCNC: 170 MG/DL — HIGH (ref 70–99)
HCO3 BLDV-SCNC: 18 MMOL/L — LOW (ref 22–29)
HCT VFR BLD CALC: 32.4 % — LOW (ref 39–50)
HGB BLD-MCNC: 11.5 G/DL — LOW (ref 13–17)
IMM GRANULOCYTES NFR BLD AUTO: 1 % — SIGNIFICANT CHANGE UP (ref 0–1.5)
INR BLD: 0.92 — SIGNIFICANT CHANGE UP (ref 0.88–1.16)
LACTATE SERPL-SCNC: 2.6 MMOL/L — HIGH (ref 0.5–2)
LYMPHOCYTES # BLD AUTO: 1.95 K/UL — SIGNIFICANT CHANGE UP (ref 1–3.3)
LYMPHOCYTES # BLD AUTO: 19 % — SIGNIFICANT CHANGE UP (ref 13–44)
MAGNESIUM SERPL-MCNC: 2 MG/DL — SIGNIFICANT CHANGE UP (ref 1.6–2.6)
MCHC RBC-ENTMCNC: 29.7 PG — SIGNIFICANT CHANGE UP (ref 27–34)
MCHC RBC-ENTMCNC: 35.5 GM/DL — SIGNIFICANT CHANGE UP (ref 32–36)
MCV RBC AUTO: 83.7 FL — SIGNIFICANT CHANGE UP (ref 80–100)
MONOCYTES # BLD AUTO: 0.77 K/UL — SIGNIFICANT CHANGE UP (ref 0–0.9)
MONOCYTES NFR BLD AUTO: 7.5 % — SIGNIFICANT CHANGE UP (ref 2–14)
NEUTROPHILS # BLD AUTO: 7.29 K/UL — SIGNIFICANT CHANGE UP (ref 1.8–7.4)
NEUTROPHILS NFR BLD AUTO: 70.7 % — SIGNIFICANT CHANGE UP (ref 43–77)
NRBC # BLD: 0 /100 WBCS — SIGNIFICANT CHANGE UP (ref 0–0)
NT-PROBNP SERPL-SCNC: 721 PG/ML — HIGH (ref 0–300)
PCO2 BLDV: 43 MMHG — SIGNIFICANT CHANGE UP (ref 42–55)
PH BLDV: 7.24 — LOW (ref 7.32–7.43)
PLATELET # BLD AUTO: 257 K/UL — SIGNIFICANT CHANGE UP (ref 150–400)
PO2 BLDV: 22 MMHG — SIGNIFICANT CHANGE UP
POTASSIUM BLDV-SCNC: 8 MMOL/L — CRITICAL HIGH (ref 3.5–5.1)
POTASSIUM SERPL-MCNC: 8.2 MMOL/L — CRITICAL HIGH (ref 3.5–5.3)
POTASSIUM SERPL-SCNC: 8.2 MMOL/L — CRITICAL HIGH (ref 3.5–5.3)
PROT SERPL-MCNC: 8.4 G/DL — HIGH (ref 6–8.3)
PROTHROM AB SERPL-ACNC: 11.1 SEC — SIGNIFICANT CHANGE UP (ref 10.6–13.6)
RBC # BLD: 3.87 M/UL — LOW (ref 4.2–5.8)
RBC # FLD: 13.2 % — SIGNIFICANT CHANGE UP (ref 10.3–14.5)
SAO2 % BLDV: 34.3 % — SIGNIFICANT CHANGE UP
SARS-COV-2 RNA SPEC QL NAA+PROBE: NEGATIVE — SIGNIFICANT CHANGE UP
SODIUM SERPL-SCNC: 112 MMOL/L — CRITICAL LOW (ref 135–145)
TROPONIN T SERPL-MCNC: 0.02 NG/ML — HIGH (ref 0–0.01)
WBC # BLD: 10.29 K/UL — SIGNIFICANT CHANGE UP (ref 3.8–10.5)
WBC # FLD AUTO: 10.29 K/UL — SIGNIFICANT CHANGE UP (ref 3.8–10.5)

## 2021-07-27 PROCEDURE — 99291 CRITICAL CARE FIRST HOUR: CPT

## 2021-07-27 PROCEDURE — 71045 X-RAY EXAM CHEST 1 VIEW: CPT | Mod: 26

## 2021-07-27 PROCEDURE — 99292 CRITICAL CARE ADDL 30 MIN: CPT

## 2021-07-27 PROCEDURE — 93010 ELECTROCARDIOGRAM REPORT: CPT | Mod: 76

## 2021-07-27 RX ORDER — DEXTROSE 50 % IN WATER 50 %
50 SYRINGE (ML) INTRAVENOUS ONCE
Refills: 0 | Status: COMPLETED | OUTPATIENT
Start: 2021-07-27 | End: 2021-07-27

## 2021-07-27 RX ORDER — SODIUM BICARBONATE 1 MEQ/ML
50 SYRINGE (ML) INTRAVENOUS ONCE
Refills: 0 | Status: COMPLETED | OUTPATIENT
Start: 2021-07-27 | End: 2021-07-27

## 2021-07-27 RX ORDER — INSULIN HUMAN 100 [IU]/ML
10 INJECTION, SOLUTION SUBCUTANEOUS ONCE
Refills: 0 | Status: COMPLETED | OUTPATIENT
Start: 2021-07-27 | End: 2021-07-27

## 2021-07-27 RX ORDER — FUROSEMIDE 40 MG
60 TABLET ORAL ONCE
Refills: 0 | Status: COMPLETED | OUTPATIENT
Start: 2021-07-27 | End: 2021-07-27

## 2021-07-27 RX ORDER — CALCIUM GLUCONATE 100 MG/ML
1 VIAL (ML) INTRAVENOUS ONCE
Refills: 0 | Status: COMPLETED | OUTPATIENT
Start: 2021-07-27 | End: 2021-07-27

## 2021-07-27 RX ORDER — SODIUM ZIRCONIUM CYCLOSILICATE 10 G/10G
10 POWDER, FOR SUSPENSION ORAL ONCE
Refills: 0 | Status: COMPLETED | OUTPATIENT
Start: 2021-07-27 | End: 2021-07-27

## 2021-07-27 RX ORDER — MAGNESIUM SULFATE 500 MG/ML
1 VIAL (ML) INJECTION ONCE
Refills: 0 | Status: COMPLETED | OUTPATIENT
Start: 2021-07-27 | End: 2021-07-27

## 2021-07-27 RX ADMIN — Medication 50 MILLIEQUIVALENT(S): at 23:47

## 2021-07-27 RX ADMIN — Medication 100 GRAM(S): at 23:29

## 2021-07-27 RX ADMIN — SODIUM ZIRCONIUM CYCLOSILICATE 10 GRAM(S): 10 POWDER, FOR SUSPENSION ORAL at 23:47

## 2021-07-27 RX ADMIN — Medication 60 MILLIGRAM(S): at 23:47

## 2021-07-27 RX ADMIN — Medication 50 MILLILITER(S): at 23:29

## 2021-07-27 RX ADMIN — INSULIN HUMAN 10 UNIT(S): 100 INJECTION, SOLUTION SUBCUTANEOUS at 23:45

## 2021-07-27 RX ADMIN — Medication 100 GRAM(S): at 23:47

## 2021-07-27 RX ADMIN — Medication 100 GRAM(S): at 23:08

## 2021-07-27 NOTE — ED PROVIDER NOTE - CARE PLAN
Principal Discharge DX:	Symptomatic bradycardia   Principal Discharge DX:	Symptomatic bradycardia  Secondary Diagnosis:	Hyperkalemia  Secondary Diagnosis:	MEG (acute kidney injury)  Secondary Diagnosis:	Hyponatremia  Secondary Diagnosis:	Urinary retention

## 2021-07-27 NOTE — ED PROVIDER NOTE - CLINICAL SUMMARY MEDICAL DECISION MAKING FREE TEXT BOX
61M etoh cirrhosis, htn, dm, c/o gen weakness. found to have junctional wide bradycardia hr 30s likely 2/2 underlying hyperkalemia 8s/cr 1.7s in setting of urinary retention. s/p hyperkalemia cocktail and soliz placement. also found to have euvolemic vs hypovolemic hyponatremia. urine lytes pending. s/p ct head/a/p. renal/micu consulted. s/o'd overnight team stable pending renal/micu reccs and ct reads -- anticipate admission.

## 2021-07-27 NOTE — ED PROVIDER NOTE - PROGRESS NOTE DETAILS
margaret consulted. will see pt. renal consulted. pending call back for reccs. while awaiting micu/renal reccs, pt w/ recurrent sinus michelle hr 30s. hds. no hypoxia. asymptomatic. a+ox3. s/p ca g w/ improved hr 60s. afebrile. junctional wide complex regular w/ peaked t waves hr 30-40s on ekg. hds. no hypoxia. generalized weakness otherwise asymptomatic. a+ox3. euvolemic on exam. s/p total ca 2g w/ improved hr 70s sinus narrow complex regular. k 8s/cr 1.7s/na 110s in setting of urinary rentention. bladder u/s: 1600cc postvoid residual. s/p hyperk cocktail. s/p soliz, clamped at 1000cc to avoid hypotension. micu/renal paged. micu reccs pending renal consult. reportedly will hold dispo until know if renal will dialyze pt. pt remains hds. will unclamp martínez. renal reccs for additional lokelma 10/sodium bicarb 50/insulin 10/D50. will call back w/ final reccs. micu reccs for admission to micu under dr singh. renal reccs for D5W @100cc/hr to avoid overcorrection na (goal 119 by 11p 7/28/21) and repeat bmp/vbg q2h.

## 2021-07-27 NOTE — ED PROVIDER NOTE - OBJECTIVE STATEMENT
61M etoh cirrhosis, htn, dm c/b neuropathy, last hospitalized (3/30/20-6/5/20) for toxic megacolon w/ pneumatosis s/p total colectomy/ileostomy (3/31/20) c/b covid infection not req intervention, completed covid19 vaccination (4/2021), c/o <24h progressively worsening generalized weakness now unable to walk. at baseline yesterday. no change in ileostomy output. last etoh 4d ago (only occasional etoh use since 2016). no fever/chills, no ha/dizziness, no uri/cough, no cp/sob, no abd pain/n/v, no hematochezia/melena, no change in appetite/po intake, no dysuria/hematuria/difficulty urinating, no pedal edema/pain/rash, no recent travel, no sick contacts, no prior covid, no trauma, no ivdu. 61M etoh cirrhosis, htn, dm c/b neuropathy, last hospitalized (3/30/20-6/5/20) for toxic megacolon w/ pneumatosis s/p total colectomy/ileostomy (3/31/20) c/b covid infection not req intervention, completed covid19 vaccination (4/2021), c/o <24h progressively worsening generalized weakness now unable to walk. at baseline yesterday. no change in ileostomy output. last etoh 4d ago -- 2 beers (only occasional etoh use since 2016). no fever/chills, no ha/dizziness, no uri/cough, no cp/sob, no abd pain/n/v, no hematochezia/melena, no change in appetite/po intake, no dysuria/hematuria/difficulty urinating, no pedal edema/pain/rash, no recent travel, no sick contacts, no prior covid, no trauma, no ivdu.

## 2021-07-27 NOTE — ED PROVIDER NOTE - SHIFT CHANGE DETAILS
61M etoh cirrhosis, htn, dm, c/o gen weakness. found to have junctional wide bradycardia hr 30s likely 2/2 underlying hyperkalemia 8s/cr 1.7s in setting of urinary retention. s/p hyperkalemia cocktail and soliz placement. also found to have euvolemic vs hypovolemic hyponatremia. urine lytes pending. s/p ct head/a/p.    dispo: pending renal/micu reccs and ct reads -- anticipate admission

## 2021-07-27 NOTE — ED ADULT TRIAGE NOTE - OTHER COMPLAINTS
c.o generalized weakness x 2 days with leaking colostomy. reports low HR. denies dizziness or cp. fs 188.

## 2021-07-28 DIAGNOSIS — R09.89 OTHER SPECIFIED SYMPTOMS AND SIGNS INVOLVING THE CIRCULATORY AND RESPIRATORY SYSTEMS: ICD-10-CM

## 2021-07-28 LAB
AMPHET UR-MCNC: NEGATIVE — SIGNIFICANT CHANGE UP
ANION GAP SERPL CALC-SCNC: -37 MMOL/L — LOW (ref 5–17)
ANION GAP SERPL CALC-SCNC: 10 MMOL/L — SIGNIFICANT CHANGE UP (ref 5–17)
ANION GAP SERPL CALC-SCNC: 11 MMOL/L — SIGNIFICANT CHANGE UP (ref 5–17)
ANION GAP SERPL CALC-SCNC: 13 MMOL/L — SIGNIFICANT CHANGE UP (ref 5–17)
ANION GAP SERPL CALC-SCNC: 8 MMOL/L — SIGNIFICANT CHANGE UP (ref 5–17)
ANION GAP SERPL CALC-SCNC: 8 MMOL/L — SIGNIFICANT CHANGE UP (ref 5–17)
ANION GAP SERPL CALC-SCNC: 9 MMOL/L — SIGNIFICANT CHANGE UP (ref 5–17)
ANION GAP SERPL CALC-SCNC: 9 MMOL/L — SIGNIFICANT CHANGE UP (ref 5–17)
APPEARANCE UR: CLEAR — SIGNIFICANT CHANGE UP
BARBITURATES UR SCN-MCNC: NEGATIVE — SIGNIFICANT CHANGE UP
BASE EXCESS BLDV CALC-SCNC: -0.6 MMOL/L — SIGNIFICANT CHANGE UP (ref -2–3)
BASE EXCESS BLDV CALC-SCNC: -1.1 MMOL/L — SIGNIFICANT CHANGE UP (ref -2–3)
BASE EXCESS BLDV CALC-SCNC: -5.3 MMOL/L — LOW (ref -2–3)
BASE EXCESS BLDV CALC-SCNC: -6.8 MMOL/L — LOW (ref -2–3)
BASOPHILS # BLD AUTO: 0.03 K/UL — SIGNIFICANT CHANGE UP (ref 0–0.2)
BASOPHILS NFR BLD AUTO: 0.3 % — SIGNIFICANT CHANGE UP (ref 0–2)
BENZODIAZ UR-MCNC: NEGATIVE — SIGNIFICANT CHANGE UP
BILIRUB UR-MCNC: NEGATIVE — SIGNIFICANT CHANGE UP
BUN SERPL-MCNC: 20 MG/DL — SIGNIFICANT CHANGE UP (ref 7–23)
BUN SERPL-MCNC: 20 MG/DL — SIGNIFICANT CHANGE UP (ref 7–23)
BUN SERPL-MCNC: 21 MG/DL — SIGNIFICANT CHANGE UP (ref 7–23)
BUN SERPL-MCNC: 24 MG/DL — HIGH (ref 7–23)
BUN SERPL-MCNC: 27 MG/DL — HIGH (ref 7–23)
BUN SERPL-MCNC: 32 MG/DL — HIGH (ref 7–23)
BUN SERPL-MCNC: 33 MG/DL — HIGH (ref 7–23)
BUN SERPL-MCNC: 34 MG/DL — HIGH (ref 7–23)
BUN SERPL-MCNC: 36 MG/DL — HIGH (ref 7–23)
BUN SERPL-MCNC: 36 MG/DL — HIGH (ref 7–23)
CA-I SERPL-SCNC: 1.27 MMOL/L — SIGNIFICANT CHANGE UP (ref 1.15–1.33)
CA-I SERPL-SCNC: 1.29 MMOL/L — SIGNIFICANT CHANGE UP (ref 1.15–1.33)
CA-I SERPL-SCNC: 1.32 MMOL/L — SIGNIFICANT CHANGE UP (ref 1.15–1.33)
CA-I SERPL-SCNC: 1.32 MMOL/L — SIGNIFICANT CHANGE UP (ref 1.15–1.33)
CALCIUM SERPL-MCNC: 10.1 MG/DL — SIGNIFICANT CHANGE UP (ref 8.4–10.5)
CALCIUM SERPL-MCNC: 10.1 MG/DL — SIGNIFICANT CHANGE UP (ref 8.4–10.5)
CALCIUM SERPL-MCNC: 10.2 MG/DL — SIGNIFICANT CHANGE UP (ref 8.4–10.5)
CALCIUM SERPL-MCNC: 9.4 MG/DL — SIGNIFICANT CHANGE UP (ref 8.4–10.5)
CALCIUM SERPL-MCNC: 9.4 MG/DL — SIGNIFICANT CHANGE UP (ref 8.4–10.5)
CALCIUM SERPL-MCNC: 9.5 MG/DL — SIGNIFICANT CHANGE UP (ref 8.4–10.5)
CALCIUM SERPL-MCNC: 9.6 MG/DL — SIGNIFICANT CHANGE UP (ref 8.4–10.5)
CALCIUM SERPL-MCNC: 9.8 MG/DL — SIGNIFICANT CHANGE UP (ref 8.4–10.5)
CALCIUM SERPL-MCNC: 9.9 MG/DL — SIGNIFICANT CHANGE UP (ref 8.4–10.5)
CHLORIDE SERPL-SCNC: 109 MMOL/L — HIGH (ref 96–108)
CHLORIDE SERPL-SCNC: 82 MMOL/L — LOW (ref 96–108)
CHLORIDE SERPL-SCNC: 83 MMOL/L — LOW (ref 96–108)
CHLORIDE SERPL-SCNC: 84 MMOL/L — LOW (ref 96–108)
CHLORIDE SERPL-SCNC: 85 MMOL/L — LOW (ref 96–108)
CHLORIDE SERPL-SCNC: 87 MMOL/L — LOW (ref 96–108)
CO2 BLDV-SCNC: 21 MMOL/L — LOW (ref 22–26)
CO2 BLDV-SCNC: 22.6 MMOL/L — SIGNIFICANT CHANGE UP (ref 22–26)
CO2 BLDV-SCNC: 25.6 MMOL/L — SIGNIFICANT CHANGE UP (ref 22–26)
CO2 BLDV-SCNC: 26.8 MMOL/L — HIGH (ref 22–26)
CO2 SERPL-SCNC: 17 MMOL/L — LOW (ref 22–31)
CO2 SERPL-SCNC: 19 MMOL/L — LOW (ref 22–31)
CO2 SERPL-SCNC: 21 MMOL/L — LOW (ref 22–31)
CO2 SERPL-SCNC: 22 MMOL/L — SIGNIFICANT CHANGE UP (ref 22–31)
CO2 SERPL-SCNC: 22 MMOL/L — SIGNIFICANT CHANGE UP (ref 22–31)
CO2 SERPL-SCNC: 23 MMOL/L — SIGNIFICANT CHANGE UP (ref 22–31)
CO2 SERPL-SCNC: 23 MMOL/L — SIGNIFICANT CHANGE UP (ref 22–31)
CO2 SERPL-SCNC: 24 MMOL/L — SIGNIFICANT CHANGE UP (ref 22–31)
CO2 SERPL-SCNC: 24 MMOL/L — SIGNIFICANT CHANGE UP (ref 22–31)
CO2 SERPL-SCNC: 25 MMOL/L — SIGNIFICANT CHANGE UP (ref 22–31)
COCAINE METAB.OTHER UR-MCNC: NEGATIVE — SIGNIFICANT CHANGE UP
COLOR SPEC: YELLOW — SIGNIFICANT CHANGE UP
COVID-19 SPIKE DOMAIN AB INTERP: POSITIVE
COVID-19 SPIKE DOMAIN ANTIBODY RESULT: >250 U/ML — HIGH
CREAT ?TM UR-MCNC: 22 MG/DL — SIGNIFICANT CHANGE UP
CREAT SERPL-MCNC: 0.96 MG/DL — SIGNIFICANT CHANGE UP (ref 0.5–1.3)
CREAT SERPL-MCNC: 0.96 MG/DL — SIGNIFICANT CHANGE UP (ref 0.5–1.3)
CREAT SERPL-MCNC: 0.97 MG/DL — SIGNIFICANT CHANGE UP (ref 0.5–1.3)
CREAT SERPL-MCNC: 1.08 MG/DL — SIGNIFICANT CHANGE UP (ref 0.5–1.3)
CREAT SERPL-MCNC: 1.2 MG/DL — SIGNIFICANT CHANGE UP (ref 0.5–1.3)
CREAT SERPL-MCNC: 1.32 MG/DL — HIGH (ref 0.5–1.3)
CREAT SERPL-MCNC: 1.56 MG/DL — HIGH (ref 0.5–1.3)
CREAT SERPL-MCNC: 1.57 MG/DL — HIGH (ref 0.5–1.3)
CREAT SERPL-MCNC: 1.64 MG/DL — HIGH (ref 0.5–1.3)
CREAT SERPL-MCNC: 1.73 MG/DL — HIGH (ref 0.5–1.3)
DIFF PNL FLD: NEGATIVE — SIGNIFICANT CHANGE UP
EOSINOPHIL # BLD AUTO: 0.22 K/UL — SIGNIFICANT CHANGE UP (ref 0–0.5)
EOSINOPHIL NFR BLD AUTO: 2.4 % — SIGNIFICANT CHANGE UP (ref 0–6)
GAS PNL BLDV: 115 MMOL/L — CRITICAL LOW (ref 136–145)
GAS PNL BLDV: 116 MMOL/L — CRITICAL LOW (ref 136–145)
GAS PNL BLDV: 116 MMOL/L — CRITICAL LOW (ref 136–145)
GAS PNL BLDV: 118 MMOL/L — CRITICAL LOW (ref 136–145)
GAS PNL BLDV: SIGNIFICANT CHANGE UP
GLUCOSE BLDC GLUCOMTR-MCNC: 121 MG/DL — HIGH (ref 70–99)
GLUCOSE BLDC GLUCOMTR-MCNC: 138 MG/DL — HIGH (ref 70–99)
GLUCOSE BLDC GLUCOMTR-MCNC: 139 MG/DL — HIGH (ref 70–99)
GLUCOSE BLDC GLUCOMTR-MCNC: 141 MG/DL — HIGH (ref 70–99)
GLUCOSE BLDC GLUCOMTR-MCNC: 193 MG/DL — HIGH (ref 70–99)
GLUCOSE BLDC GLUCOMTR-MCNC: 233 MG/DL — HIGH (ref 70–99)
GLUCOSE BLDC GLUCOMTR-MCNC: 243 MG/DL — HIGH (ref 70–99)
GLUCOSE BLDC GLUCOMTR-MCNC: 244 MG/DL — HIGH (ref 70–99)
GLUCOSE SERPL-MCNC: 135 MG/DL — HIGH (ref 70–99)
GLUCOSE SERPL-MCNC: 136 MG/DL — HIGH (ref 70–99)
GLUCOSE SERPL-MCNC: 164 MG/DL — HIGH (ref 70–99)
GLUCOSE SERPL-MCNC: 169 MG/DL — HIGH (ref 70–99)
GLUCOSE SERPL-MCNC: 170 MG/DL — HIGH (ref 70–99)
GLUCOSE SERPL-MCNC: 208 MG/DL — HIGH (ref 70–99)
GLUCOSE SERPL-MCNC: 241 MG/DL — HIGH (ref 70–99)
GLUCOSE SERPL-MCNC: 241 MG/DL — HIGH (ref 70–99)
GLUCOSE SERPL-MCNC: 242 MG/DL — HIGH (ref 70–99)
GLUCOSE SERPL-MCNC: 346 MG/DL — HIGH (ref 70–99)
GLUCOSE UR QL: 100
HCO3 BLDV-SCNC: 20 MMOL/L — LOW (ref 22–29)
HCO3 BLDV-SCNC: 21 MMOL/L — LOW (ref 22–29)
HCO3 BLDV-SCNC: 24 MMOL/L — SIGNIFICANT CHANGE UP (ref 22–29)
HCO3 BLDV-SCNC: 25 MMOL/L — SIGNIFICANT CHANGE UP (ref 22–29)
HCT VFR BLD CALC: 29.3 % — LOW (ref 39–50)
HGB BLD-MCNC: 10.6 G/DL — LOW (ref 13–17)
IMM GRANULOCYTES NFR BLD AUTO: 0.7 % — SIGNIFICANT CHANGE UP (ref 0–1.5)
KETONES UR-MCNC: NEGATIVE — SIGNIFICANT CHANGE UP
LEUKOCYTE ESTERASE UR-ACNC: NEGATIVE — SIGNIFICANT CHANGE UP
LYMPHOCYTES # BLD AUTO: 2.05 K/UL — SIGNIFICANT CHANGE UP (ref 1–3.3)
LYMPHOCYTES # BLD AUTO: 22.6 % — SIGNIFICANT CHANGE UP (ref 13–44)
MCHC RBC-ENTMCNC: 30 PG — SIGNIFICANT CHANGE UP (ref 27–34)
MCHC RBC-ENTMCNC: 36.2 GM/DL — HIGH (ref 32–36)
MCV RBC AUTO: 83 FL — SIGNIFICANT CHANGE UP (ref 80–100)
METHADONE UR-MCNC: NEGATIVE — SIGNIFICANT CHANGE UP
MONOCYTES # BLD AUTO: 0.88 K/UL — SIGNIFICANT CHANGE UP (ref 0–0.9)
MONOCYTES NFR BLD AUTO: 9.7 % — SIGNIFICANT CHANGE UP (ref 2–14)
NEUTROPHILS # BLD AUTO: 5.83 K/UL — SIGNIFICANT CHANGE UP (ref 1.8–7.4)
NEUTROPHILS NFR BLD AUTO: 64.3 % — SIGNIFICANT CHANGE UP (ref 43–77)
NITRITE UR-MCNC: NEGATIVE — SIGNIFICANT CHANGE UP
NRBC # BLD: 0 /100 WBCS — SIGNIFICANT CHANGE UP (ref 0–0)
OPIATES UR-MCNC: NEGATIVE — SIGNIFICANT CHANGE UP
OSMOLALITY SERPL: 263 MOSM/KG — LOW (ref 280–301)
OSMOLALITY UR: 182 MOSM/KG — LOW (ref 300–900)
OSMOLALITY UR: 348 MOSM/KG — SIGNIFICANT CHANGE UP (ref 300–900)
OSMOLALITY UR: 442 MOSM/KG — SIGNIFICANT CHANGE UP (ref 300–900)
OSMOLALITY UR: 461 MOSM/KG — SIGNIFICANT CHANGE UP (ref 300–900)
OSMOLALITY UR: 492 MOSM/KG — SIGNIFICANT CHANGE UP (ref 300–900)
OSMOLALITY UR: 557 MOSM/KG — SIGNIFICANT CHANGE UP (ref 300–900)
PCO2 BLDV: 42 MMHG — SIGNIFICANT CHANGE UP (ref 42–55)
PCO2 BLDV: 42 MMHG — SIGNIFICANT CHANGE UP (ref 42–55)
PCO2 BLDV: 44 MMHG — SIGNIFICANT CHANGE UP (ref 42–55)
PCO2 BLDV: 46 MMHG — SIGNIFICANT CHANGE UP (ref 42–55)
PCP SPEC-MCNC: SIGNIFICANT CHANGE UP
PCP UR-MCNC: NEGATIVE — SIGNIFICANT CHANGE UP
PH BLDV: 7.28 — LOW (ref 7.32–7.43)
PH BLDV: 7.29 — LOW (ref 7.32–7.43)
PH BLDV: 7.35 — SIGNIFICANT CHANGE UP (ref 7.32–7.43)
PH BLDV: 7.37 — SIGNIFICANT CHANGE UP (ref 7.32–7.43)
PH UR: 6 — SIGNIFICANT CHANGE UP (ref 5–8)
PHOSPHATE SERPL-MCNC: 4.5 MG/DL — SIGNIFICANT CHANGE UP (ref 2.5–4.5)
PLATELET # BLD AUTO: 205 K/UL — SIGNIFICANT CHANGE UP (ref 150–400)
PO2 BLDV: 37 MMHG — SIGNIFICANT CHANGE UP
PO2 BLDV: 39 MMHG — SIGNIFICANT CHANGE UP
PO2 BLDV: 41 MMHG — SIGNIFICANT CHANGE UP
PO2 BLDV: 56 MMHG — SIGNIFICANT CHANGE UP
POTASSIUM BLDV-SCNC: 5.5 MMOL/L — HIGH (ref 3.5–5.1)
POTASSIUM BLDV-SCNC: 5.5 MMOL/L — HIGH (ref 3.5–5.1)
POTASSIUM BLDV-SCNC: 7 MMOL/L — CRITICAL HIGH (ref 3.5–5.1)
POTASSIUM BLDV-SCNC: 7.1 MMOL/L — CRITICAL HIGH (ref 3.5–5.1)
POTASSIUM SERPL-MCNC: 4.5 MMOL/L — SIGNIFICANT CHANGE UP (ref 3.5–5.3)
POTASSIUM SERPL-MCNC: 5.1 MMOL/L — SIGNIFICANT CHANGE UP (ref 3.5–5.3)
POTASSIUM SERPL-MCNC: 5.3 MMOL/L — SIGNIFICANT CHANGE UP (ref 3.5–5.3)
POTASSIUM SERPL-MCNC: 5.4 MMOL/L — HIGH (ref 3.5–5.3)
POTASSIUM SERPL-MCNC: 5.8 MMOL/L — HIGH (ref 3.5–5.3)
POTASSIUM SERPL-MCNC: 5.9 MMOL/L — HIGH (ref 3.5–5.3)
POTASSIUM SERPL-MCNC: 6 MMOL/L — HIGH (ref 3.5–5.3)
POTASSIUM SERPL-MCNC: 8 MMOL/L — CRITICAL HIGH (ref 3.5–5.3)
POTASSIUM SERPL-SCNC: 4.5 MMOL/L — SIGNIFICANT CHANGE UP (ref 3.5–5.3)
POTASSIUM SERPL-SCNC: 5.1 MMOL/L — SIGNIFICANT CHANGE UP (ref 3.5–5.3)
POTASSIUM SERPL-SCNC: 5.3 MMOL/L — SIGNIFICANT CHANGE UP (ref 3.5–5.3)
POTASSIUM SERPL-SCNC: 5.4 MMOL/L — HIGH (ref 3.5–5.3)
POTASSIUM SERPL-SCNC: 5.8 MMOL/L — HIGH (ref 3.5–5.3)
POTASSIUM SERPL-SCNC: 5.9 MMOL/L — HIGH (ref 3.5–5.3)
POTASSIUM SERPL-SCNC: 6 MMOL/L — HIGH (ref 3.5–5.3)
POTASSIUM SERPL-SCNC: 8 MMOL/L — CRITICAL HIGH (ref 3.5–5.3)
POTASSIUM UR-SCNC: 85 MMOL/L — SIGNIFICANT CHANGE UP
PROT UR-MCNC: NEGATIVE MG/DL — SIGNIFICANT CHANGE UP
RBC # BLD: 3.53 M/UL — LOW (ref 4.2–5.8)
RBC # FLD: 13.1 % — SIGNIFICANT CHANGE UP (ref 10.3–14.5)
SAO2 % BLDV: 57.2 % — SIGNIFICANT CHANGE UP
SAO2 % BLDV: 57.9 % — SIGNIFICANT CHANGE UP
SAO2 % BLDV: 62.1 % — SIGNIFICANT CHANGE UP
SAO2 % BLDV: 85.6 % — SIGNIFICANT CHANGE UP
SARS-COV-2 IGG+IGM SERPL QL IA: >250 U/ML — HIGH
SARS-COV-2 IGG+IGM SERPL QL IA: POSITIVE
SODIUM SERPL-SCNC: 110 MMOL/L — CRITICAL LOW (ref 135–145)
SODIUM SERPL-SCNC: 115 MMOL/L — CRITICAL LOW (ref 135–145)
SODIUM SERPL-SCNC: 118 MMOL/L — CRITICAL LOW (ref 135–145)
SODIUM SERPL-SCNC: 121 MMOL/L — LOW (ref 135–145)
SODIUM SERPL-SCNC: 91 MMOL/L — CRITICAL LOW (ref 135–145)
SODIUM UR-SCNC: 20 MMOL/L — SIGNIFICANT CHANGE UP
SODIUM UR-SCNC: 23 MMOL/L — SIGNIFICANT CHANGE UP
SODIUM UR-SCNC: 23 MMOL/L — SIGNIFICANT CHANGE UP
SODIUM UR-SCNC: <20 MMOL/L — SIGNIFICANT CHANGE UP
SP GR SPEC: 1.01 — SIGNIFICANT CHANGE UP (ref 1–1.03)
T3FREE SERPL-MCNC: 2.28 PG/ML — SIGNIFICANT CHANGE UP (ref 1.8–4.6)
T4 FREE SERPL-MCNC: 1.37 NG/DL — SIGNIFICANT CHANGE UP (ref 0.93–1.7)
THC UR QL: NEGATIVE — SIGNIFICANT CHANGE UP
TSH SERPL-MCNC: 0.58 UIU/ML — SIGNIFICANT CHANGE UP (ref 0.27–4.2)
URATE SERPL-MCNC: 7.6 MG/DL — SIGNIFICANT CHANGE UP (ref 3.4–8.8)
UROBILINOGEN FLD QL: 0.2 E.U./DL — SIGNIFICANT CHANGE UP
UUN UR-MCNC: 192 MG/DL — SIGNIFICANT CHANGE UP
WBC # BLD: 9.07 K/UL — SIGNIFICANT CHANGE UP (ref 3.8–10.5)
WBC # FLD AUTO: 9.07 K/UL — SIGNIFICANT CHANGE UP (ref 3.8–10.5)

## 2021-07-28 PROCEDURE — 99291 CRITICAL CARE FIRST HOUR: CPT

## 2021-07-28 PROCEDURE — 99223 1ST HOSP IP/OBS HIGH 75: CPT

## 2021-07-28 PROCEDURE — 99233 SBSQ HOSP IP/OBS HIGH 50: CPT | Mod: GC

## 2021-07-28 PROCEDURE — 74176 CT ABD & PELVIS W/O CONTRAST: CPT | Mod: 26

## 2021-07-28 PROCEDURE — 70450 CT HEAD/BRAIN W/O DYE: CPT | Mod: 26

## 2021-07-28 RX ORDER — SODIUM ZIRCONIUM CYCLOSILICATE 10 G/10G
10 POWDER, FOR SUSPENSION ORAL ONCE
Refills: 0 | Status: COMPLETED | OUTPATIENT
Start: 2021-07-28 | End: 2021-07-28

## 2021-07-28 RX ORDER — DESMOPRESSIN ACETATE 0.1 MG/1
2 TABLET ORAL ONCE
Refills: 0 | Status: COMPLETED | OUTPATIENT
Start: 2021-07-28 | End: 2021-07-28

## 2021-07-28 RX ORDER — DEXTROSE 50 % IN WATER 50 %
15 SYRINGE (ML) INTRAVENOUS ONCE
Refills: 0 | Status: DISCONTINUED | OUTPATIENT
Start: 2021-07-28 | End: 2021-08-01

## 2021-07-28 RX ORDER — GABAPENTIN 400 MG/1
300 CAPSULE ORAL ONCE
Refills: 0 | Status: COMPLETED | OUTPATIENT
Start: 2021-07-28 | End: 2021-07-28

## 2021-07-28 RX ORDER — HEPARIN SODIUM 5000 [USP'U]/ML
5000 INJECTION INTRAVENOUS; SUBCUTANEOUS EVERY 8 HOURS
Refills: 0 | Status: DISCONTINUED | OUTPATIENT
Start: 2021-07-28 | End: 2021-08-01

## 2021-07-28 RX ORDER — SODIUM CHLORIDE 9 MG/ML
1000 INJECTION, SOLUTION INTRAVENOUS
Refills: 0 | Status: DISCONTINUED | OUTPATIENT
Start: 2021-07-28 | End: 2021-08-01

## 2021-07-28 RX ORDER — DEXTROSE 50 % IN WATER 50 %
12.5 SYRINGE (ML) INTRAVENOUS ONCE
Refills: 0 | Status: DISCONTINUED | OUTPATIENT
Start: 2021-07-28 | End: 2021-08-01

## 2021-07-28 RX ORDER — CHLORHEXIDINE GLUCONATE 213 G/1000ML
1 SOLUTION TOPICAL
Refills: 0 | Status: DISCONTINUED | OUTPATIENT
Start: 2021-07-28 | End: 2021-07-31

## 2021-07-28 RX ORDER — DEXTROSE 50 % IN WATER 50 %
25 SYRINGE (ML) INTRAVENOUS ONCE
Refills: 0 | Status: DISCONTINUED | OUTPATIENT
Start: 2021-07-28 | End: 2021-08-01

## 2021-07-28 RX ORDER — INSULIN LISPRO 100/ML
VIAL (ML) SUBCUTANEOUS EVERY 6 HOURS
Refills: 0 | Status: DISCONTINUED | OUTPATIENT
Start: 2021-07-28 | End: 2021-07-29

## 2021-07-28 RX ORDER — CALCIUM GLUCONATE 100 MG/ML
1 VIAL (ML) INTRAVENOUS ONCE
Refills: 0 | Status: COMPLETED | OUTPATIENT
Start: 2021-07-28 | End: 2021-07-28

## 2021-07-28 RX ORDER — SODIUM CHLORIDE 9 MG/ML
1000 INJECTION, SOLUTION INTRAVENOUS ONCE
Refills: 0 | Status: COMPLETED | OUTPATIENT
Start: 2021-07-28 | End: 2021-07-28

## 2021-07-28 RX ORDER — DEXTROSE 50 % IN WATER 50 %
50 SYRINGE (ML) INTRAVENOUS ONCE
Refills: 0 | Status: COMPLETED | OUTPATIENT
Start: 2021-07-28 | End: 2021-07-28

## 2021-07-28 RX ORDER — GLUCAGON INJECTION, SOLUTION 0.5 MG/.1ML
1 INJECTION, SOLUTION SUBCUTANEOUS ONCE
Refills: 0 | Status: DISCONTINUED | OUTPATIENT
Start: 2021-07-28 | End: 2021-08-01

## 2021-07-28 RX ORDER — SODIUM BICARBONATE 1 MEQ/ML
50 SYRINGE (ML) INTRAVENOUS ONCE
Refills: 0 | Status: COMPLETED | OUTPATIENT
Start: 2021-07-28 | End: 2021-07-28

## 2021-07-28 RX ORDER — SODIUM CHLORIDE 9 MG/ML
1000 INJECTION, SOLUTION INTRAVENOUS
Refills: 0 | Status: DISCONTINUED | OUTPATIENT
Start: 2021-07-28 | End: 2021-07-28

## 2021-07-28 RX ORDER — INSULIN HUMAN 100 [IU]/ML
10 INJECTION, SOLUTION SUBCUTANEOUS ONCE
Refills: 0 | Status: COMPLETED | OUTPATIENT
Start: 2021-07-28 | End: 2021-07-28

## 2021-07-28 RX ADMIN — Medication 50 MILLIEQUIVALENT(S): at 02:42

## 2021-07-28 RX ADMIN — SODIUM CHLORIDE 100 MILLILITER(S): 9 INJECTION, SOLUTION INTRAVENOUS at 03:05

## 2021-07-28 RX ADMIN — CHLORHEXIDINE GLUCONATE 1 APPLICATION(S): 213 SOLUTION TOPICAL at 08:38

## 2021-07-28 RX ADMIN — SODIUM ZIRCONIUM CYCLOSILICATE 10 GRAM(S): 10 POWDER, FOR SUSPENSION ORAL at 02:53

## 2021-07-28 RX ADMIN — INSULIN HUMAN 10 UNIT(S): 100 INJECTION, SOLUTION SUBCUTANEOUS at 02:42

## 2021-07-28 RX ADMIN — SODIUM ZIRCONIUM CYCLOSILICATE 10 GRAM(S): 10 POWDER, FOR SUSPENSION ORAL at 23:14

## 2021-07-28 RX ADMIN — DESMOPRESSIN ACETATE 2 MICROGRAM(S): 0.1 TABLET ORAL at 09:19

## 2021-07-28 RX ADMIN — DESMOPRESSIN ACETATE 2 MICROGRAM(S): 0.1 TABLET ORAL at 11:03

## 2021-07-28 RX ADMIN — SODIUM CHLORIDE 1000 MILLILITER(S): 9 INJECTION, SOLUTION INTRAVENOUS at 07:40

## 2021-07-28 RX ADMIN — HEPARIN SODIUM 5000 UNIT(S): 5000 INJECTION INTRAVENOUS; SUBCUTANEOUS at 14:29

## 2021-07-28 RX ADMIN — GABAPENTIN 300 MILLIGRAM(S): 400 CAPSULE ORAL at 21:37

## 2021-07-28 RX ADMIN — SODIUM CHLORIDE 200 MILLILITER(S): 9 INJECTION, SOLUTION INTRAVENOUS at 10:44

## 2021-07-28 RX ADMIN — Medication 4: at 11:35

## 2021-07-28 RX ADMIN — Medication 50 MILLILITER(S): at 02:42

## 2021-07-28 RX ADMIN — Medication 100 GRAM(S): at 01:43

## 2021-07-28 RX ADMIN — HEPARIN SODIUM 5000 UNIT(S): 5000 INJECTION INTRAVENOUS; SUBCUTANEOUS at 21:37

## 2021-07-28 NOTE — CONSULT NOTE ADULT - ASSESSMENT
62 y/o M HTN, DM complicated by neuropathy, EtOH liver cirrhosis, hx IVDU, toxic megacolon c/b pneumatosis s/p colectomy/ileostomy who presents with generalized weakness x 3 days. ICU consulted for hyponatremia and hyperkalemia with EKG changes and bradycardia.     NEURO  -viv    PULM  -viv    CV  #bradycardia    METABOLIC  #hyperkalemia    #hyponatremia    RENAL  #MEG    ENDOCRINE  #DM2    GI  -viv    ID  -viv    HEME/ONC  #anemia    F: D5 at 100  N: carb consistent  DVT: hep subq  FULL CODE  DISPO: MICU 60 y/o M HTN, DM complicated by neuropathy, EtOH liver cirrhosis, hx IVDU, toxic megacolon c/b pneumatosis s/p colectomy/ileostomy who presents with generalized weakness x 3 days. ICU consulted for hyponatremia and hyperkalemia with EKG changes and bradycardia.     NEURO  -viv    PULM  -viv    CV  #bradycardia  likely 2/2 hyperkalemia  patient asymptomatic   maintain on tele and treat hyperkalemia aggressively    METABOLIC  #hyperkalemia  possibly 2/2 meg although potassium seems out of proportion to meg  renal following - no need for urgent dialysis  continue with hyperkalemia cocktail as needed    #hypotonic hyponatremia  asymptomatic, patient appears hypovolemic and c/w history   f/u serum osm, urine osm, urine na  renal consulted - started on d5 to avoid overcorrection as already improved from 112 to 115 after 2L  goal Na 119 by 11pm on 7/28  f/u further renal recs  q2 bmp and urine studies    RENAL  #MEG  Bun/Cr ratio suggest pre-renal likely d/t poor PO intake and high output ileostomy  improved after fluids, continue to trend and monitor UOP    ENDOCRINE  #DM2  a1c 6.9 1 year ago  SS while inpatient  repeat a1c    GI  -viv    ID  -viv    HEME/ONC  #anemia    F: D5 at 100  N: carb consistent  DVT: hep subq  FULL CODE  DISPO: MICU 62 y/o M HTN, DM complicated by neuropathy, EtOH liver cirrhosis, hx IVDU, toxic megacolon c/b pneumatosis s/p colectomy/ileostomy who presents with generalized weakness x 3 days. ICU consulted for hyponatremia and hyperkalemia with EKG changes and bradycardia.     NEURO  -viv    PULM  -viv    CV  #bradycardia  likely 2/2 hyperkalemia  patient asymptomatic   maintain on tele and treat hyperkalemia aggressively    METABOLIC  #hyperkalemia  possibly 2/2 meg although potassium seems out of proportion to meg  renal following - no need for urgent dialysis  continue with hyperkalemia cocktail as needed    #hypotonic hyponatremia  asymptomatic, patient appears hypovolemic and c/w history   f/u serum osm, urine osm, urine na  renal consulted - started on d5 to avoid overcorrection as already improved from 112 to 115 after 2L  goal Na 119 by 11pm on 7/28  f/u further renal recs  q2 bmp and urine studies    RENAL  #MEG  Bun/Cr ratio suggest pre-renal likely d/t poor PO intake and high output ileostomy  improved after fluids, continue to trend and monitor UOP    ENDOCRINE  #DM2  a1c 6.9 1 year ago  SS while inpatient  repeat a1c    GI  -viv    ID  -viv    HEME/ONC  #anemia  baseline hg 8-9, 11.5 on admission (likely concentrated)  monitor    F: D5 at 100  N: carb consistent  DVT: hep subq  FULL CODE  DISPO: MICU

## 2021-07-28 NOTE — CONSULT NOTE ADULT - SUBJECTIVE AND OBJECTIVE BOX
HTN, DM complicated by neuropathy, EtOH liver cirrhosis, hx IVDU 60 y/o M HTN, DM complicated by neuropathy, EtOH liver cirrhosis, hx IVDU, toxic megacolon c/b pneumatosis s/p colectomy/ileostomy who presents with generalized weakness x 3 days. Patient says gradually since Sunday he has become weaker and weaker especially in his right leg. He became so weak he needed his walker to even use his bathroom. He lives at a shelter and he told the  that he didn't feel well so the  called 911 for patient. Patient says he had a few drinks on Sunday and since then has just felt "sick" and hasn't really eaten anything since. Otherwise though he denies fevers/chills, cough, abd pain, n/v. Denies any cardiac history. Says he used to drink heavily but now he only drinks once in a while e.g. at a party. He reports using chewing tobacco daily but otherwise denies drug use. ICU consulted for hyponatremia and hyperkalemia with EKG changes.    PMH/PSH: as above  SH: lives in shelter, uses chewing tobacco, former IVDU, former heavy drinker, now denies drugs and drinks socially   Meds: janumet, gabapentin, lisinopril, amlodipine, and others that he can't name [pharmacy: [place]]  Allergies: None    ED vitals: afebrile, HR 44, /68, RR 16, 98% on RA  ED labs: Hg 11.5, Na 112, K 8.2, bicarb 19, BUN 35, Cr 1.78, lactate 2.6, , CKMB 8.9, trop .02, ; vbg pH 7.24, UA unremarkable, Utox neg  EKG: wide QRS with peaked T waves  Pt received calcium gluconate 3g, lasix 60 IV, mag 1 g, Nabicarb 2 amps, lokelma 20, insulin regular 20u, D50 100cc, D5 at 100  Imaging  Consults: nephrology    Physical Exam   62 y/o M HTN, DM complicated by neuropathy, EtOH liver cirrhosis, hx IVDU, toxic megacolon c/b pneumatosis s/p colectomy/ileostomy who presents with generalized weakness x 3 days. Patient says gradually since Sunday he has become weaker and weaker especially in his right leg. He became so weak he needed his walker to even use his bathroom. He lives at a shelter and he told the  that he didn't feel well so the  called 911 for patient. Patient says he had a few drinks on Sunday and since then has just felt "sick" and hasn't really eaten anything since. Otherwise though he denies fevers/chills, cough, abd pain, n/v. Denies any cardiac history. Says he used to drink heavily but now he only drinks once in a while e.g. at a party. He reports using chewing tobacco daily but otherwise denies drug use. ICU consulted for hyponatremia and hyperkalemia with EKG changes.    PMH/PSH: as above  SH: lives in shelter, uses chewing tobacco, former IVDU, former heavy drinker, now denies drugs and drinks socially   Meds: janumet, gabapentin, lisinopril, amlodipine, and others that he can't name [pharmacy: [place]]  Allergies: None    ED vitals: afebrile, HR 44, /68, RR 16, 98% on RA  ED labs: Hg 11.5, Na 112, K 8.2, bicarb 19, BUN 35, Cr 1.78, lactate 2.6, , CKMB 8.9, trop .02, ; vbg pH 7.24, UA unremarkable, Utox neg  EKG: wide QRS with peaked T waves  Pt received calcium gluconate 3g, lasix 60 IV, mag 1 g, Nabicarb 2 amps, lokelma 20, insulin regular 20u, D50 100cc, D5 at 100  Imaging  Consults: nephrology    Physical Exam  General: Male resting in stretcher, NAD  Cardiac: bradycardic, regular rhythm, no murmurs  Resp: CTAB no wheezes/rales/rhonchi  Abd: Soft, NT/ND  Extremities: WWP, no edema  Neuro: AOx3, strength intact 5/5 upper and lower extremities b/l, CN 2-12 intact, no focal deficits noted, gait not witnessed   62 y/o M HTN, DM complicated by neuropathy, EtOH liver cirrhosis, hx IVDU, toxic megacolon c/b pneumatosis s/p colectomy/ileostomy who presents with generalized weakness x 3 days. Patient says gradually since Sunday he has become weaker and weaker especially in his right leg. He became so weak he needed his walker to even use his bathroom. He lives at a shelter and he told the  that he didn't feel well so the  called 911 for patient. Patient says he had a few drinks on Sunday and since then has just felt "sick" and hasn't really eaten anything since. Otherwise though he denies fevers/chills, cough, abd pain, n/v. Denies any cardiac history. Says he used to drink heavily but now he only drinks once in a while e.g. at a party. He reports using chewing tobacco daily but otherwise denies drug use. ICU consulted for hyponatremia and hyperkalemia with EKG changes.    PMH/PSH: as above  SH: lives in shelter, uses chewing tobacco, former IVDU, former heavy drinker, now denies drugs and drinks socially   Meds: janumet, gabapentin, lisinopril, amlodipine, and others that he can't name [pharmacy: [place]]  Allergies: None    ED vitals: afebrile, HR 44, /68, RR 16, 98% on RA  ED labs: Hg 11.5, Na 112, K 8.2, bicarb 19, BUN 35, Cr 1.78, lactate 2.6, , CKMB 8.9, trop .02, ; vbg pH 7.24, UA unremarkable, Utox neg  EKG: wide QRS with peaked T waves  Pt received calcium gluconate 3g, lasix 60 IV, mag 1 g, Nabicarb 2 amps, lokelma 20, insulin regular 20u, D50 100cc, D5 at 100  Imaging  Consults: nephrology    Physical Exam  General: Male resting in stretcher, NAD  HEENT: dry mucous membranes  Cardiac: bradycardic, regular rhythm, no murmurs  Resp: CTAB no wheezes/rales/rhonchi  Abd: Soft, NT/ND  Extremities: WWP, no edema  Neuro: AOx3, strength intact 5/5 upper and lower extremities b/l, CN 2-12 intact, no focal deficits noted, gait not witnessed

## 2021-07-28 NOTE — H&P ADULT - NSHPSOCIALHISTORY_GEN_ALL_CORE
lives in shelter, uses chewing tobacco, former IVDU, former heavy drinker, now denies drugs and drinks socially

## 2021-07-28 NOTE — CHART NOTE - NSCHARTNOTEFT_GEN_A_CORE
Nephrology consulted for hyperkalemia to 8.2, hyponatremia to ~110 vs 113 (BMP vs VBG) , and elevated Cr to 1.7 from ~1 baseline, in 61M PMHx alcohol use disorder, hx of ileostomy w/high output on loperamide, hx of salt tab use, who p/w weakness. Asymptomatic. Received hyperK cocktail including Lokelma. +EKG changes, peaked t-waves, bradycardia s/p calcium w/improvement. Bladder scan ~2L s/p soliz. K improving w/medical management and soliz.     Assessment/Plan:   #hyponatremia, hypovolemic, asymptomatic pending uLytes   possibly SIADH   possibly low solute diet contributing   not on thiazide, anti-epileptics, or SSRI   DDx also includes hypothyroidism or adrenal insufficiency   s/p 1 amp sodium bicarb     #hyperkalemia  #urinary retention   PVR ~2L   s/p soliz 1L UOP   received hyperK cocktail including calcium for EKG changes including peaked t-waves and bradycardia     #elevated creatinine   likely secondary to pre-renal physiology based on BUN:Cr pending uLytes     Recommend:   goal sNa 119 by 11pm 7/28   commence D5W @100cc/h to avoid overcorrection   STAT and q2h VBG + BMP + urine lytes (Na, Cr, Urea, Osm)   TSH, FT4, AM cortisol, uric acid, serum Osm   CXR non-contributory   will consider DDAVP 2mcg IV if repeat Na >118 on VBG   no absolute acute indication for renal replacement therapy as K improving with medical management and obstruction was treated     Thank you for the opportunity to participate in the care of your patient. The nephrology service remains available to assist with any questions or concerns. Please feel free to reach us by paging the on-call nephrology fellow for urgent issues or as below.     Vinny Chaney M.D.   PGY-5, Nephrology Fellow   C: [phone]   P: [phone]

## 2021-07-28 NOTE — PROVIDER CONTACT NOTE (CRITICAL VALUE NOTIFICATION) - RECOMMENDATIONS
RN recommended to team to slow down D5 gtt or pt will become more hyponatremic since output has decreased significantly.

## 2021-07-28 NOTE — H&P ADULT - NSHPPHYSICALEXAM_GEN_ALL_CORE
General: Male resting in stretcher, NAD  HEENT: dry mucous membranes  Cardiac: bradycardic, regular rhythm, no murmurs  Resp: CTAB no wheezes/rales/rhonchi  Abd: Soft, NT/ND  Extremities: WWP, no edema  Neuro: AOx3, strength intact 5/5 upper and lower extremities b/l, CN 2-12 intact, no focal deficits noted, gait not witnessed

## 2021-07-28 NOTE — H&P ADULT - ASSESSMENT
62 y/o M HTN, DM complicated by neuropathy, EtOH liver cirrhosis, hx IVDU, toxic megacolon c/b pneumatosis s/p colectomy/ileostomy who presents with generalized weakness x 3 days admitted to the ICU due to hyponatremia and hyperkalemia with EKG changes and bradycardia.     NEURO  -viv    PULM  -viv    CV  #bradycardia  likely 2/2 hyperkalemia  patient asymptomatic   maintain on tele and treat hyperkalemia aggressively    METABOLIC  #hyperkalemia  possibly 2/2 meg although potassium seems out of proportion to meg  renal following - no need for urgent dialysis  continue with hyperkalemia cocktail as needed    #hypotonic hyponatremia  asymptomatic, patient appears hypovolemic and c/w history   f/u serum osm, urine osm, urine na  renal consulted - started on d5 to avoid overcorrection as already improved from 112 to 115 after 2L  goal Na 119 by 11pm on 7/28  f/u further renal recs  q2 bmp and urine studies    RENAL  #MEG  Bun/Cr ratio suggest pre-renal likely d/t poor PO intake and high output ileostomy  improved after fluids, continue to trend and monitor UOP    ENDOCRINE  #DM2  a1c 6.9 1 year ago  SS while inpatient  repeat a1c    GI  -viv    ID  -viv    HEME/ONC  #anemia  baseline hg 8-9, 11.5 on admission (likely concentrated)  monitor    F: D5 at 100  N: carb consistent  DVT: hep subq  FULL CODE  DISPO: MICU

## 2021-07-28 NOTE — CONSULT NOTE ADULT - ATTENDING COMMENTS
MEG resolved with soliz  hyperkalemia with MEG on ACEI -- better-- keep off ACEI  severe hyponatremia cause not certain but quickly developed dilute polyuria with rapid recovery -- not c/w SIADH-- got second dose DDAVP to control urine output and rise in Na  vont follow uo and Na closely - goal as above
This patient was evaluated with the residents, management decisions made, see above for the details, I agree with the A/P.  Mr. Wilkerson is a 60 y/o male with DM, Liver Cirrhosis, toxic megacolon with intestinal resection and a colostomy presents with weakness has a HR in th 30's with wide complexes, Na 112 and K 8.2.  He was evaluated by nephrology and recommendation followed.  -Generalized weakness  -hyponatremia  -hypokalemia  -liver cirrhosis  -ostomy status  >please follow renal recs  >cautious correction of Na  >continue ECG monitoring, K monitoring  Please see the resident's note for the details.

## 2021-07-28 NOTE — H&P ADULT - NSHPLABSRESULTS_GEN_ALL_CORE
.  LABS:                         10.6   9.07  )-----------( 205      ( 2021 05:24 )             29.3         118<LL>  |  84<L>  |  33<H>  ----------------------------<  169<H>  5.3   |  24  |  1.57<H>    Ca    10.1      2021 05:24  Mg     2.0         TPro  8.4<H>  /  Alb  4.9  /  TBili  0.3  /  DBili  x   /  AST  27  /  ALT  15  /  AlkPhos  106      PT/INR - ( 2021 23:10 )   PT: 11.1 sec;   INR: 0.92          PTT - ( 2021 23:10 )  PTT:30.9 sec  Urinalysis Basic - ( 2021 02:07 )    Color: Yellow / Appearance: Clear / S.015 / pH: x  Gluc: x / Ketone: NEGATIVE  / Bili: Negative / Urobili: 0.2 E.U./dL   Blood: x / Protein: NEGATIVE mg/dL / Nitrite: NEGATIVE   Leuk Esterase: NEGATIVE / RBC: x / WBC x   Sq Epi: x / Non Sq Epi: x / Bacteria: x      CARDIAC MARKERS ( 2021 23:10 )  x     / 0.02 ng/mL / 553 U/L / x     / 8.9 ng/mL      Serum Pro-Brain Natriuretic Peptide: 721 pg/mL ( @ 23:10)    Lactate, Blood: 2.6 mmol/L ( @ 23:10)      RADIOLOGY, EKG & ADDITIONAL TESTS: Reviewed.

## 2021-07-28 NOTE — H&P ADULT - HISTORY OF PRESENT ILLNESS
60 y/o M HTN, DM complicated by neuropathy, EtOH liver cirrhosis, hx IVDU, toxic megacolon c/b pneumatosis s/p colectomy/ileostomy who presents with generalized weakness x 3 days. Patient says gradually since Sunday he has become weaker and weaker especially in his right leg. He became so weak he needed his walker to even use his bathroom. He lives at a shelter and he told the  that he didn't feel well so the  called 911 for patient. Patient says he had a few drinks on Sunday and since then has just felt "sick" and hasn't really eaten anything since. Otherwise though he denies fevers/chills, cough, abd pain, n/v. Denies any cardiac history. Says he used to drink heavily but now he only drinks once in a while e.g. at a party. He reports using chewing tobacco daily but otherwise denies drug use. ICU consulted for hyponatremia and hyperkalemia with EKG changes.    PMH/PSH: as above  Meds: janumet, gabapentin, lisinopril, amlodipine, and others that he can't name [pharmacy: [facility]]  Allergies: None

## 2021-07-28 NOTE — ED ADULT NURSE NOTE - NSIMPLEMENTINTERV_GEN_ALL_ED
Implemented All Fall Risk Interventions:  [place] to call system. Call bell, personal items and telephone within reach. Instruct patient to call for assistance. Room bathroom lighting operational. Non-slip footwear when patient is off stretcher. Physically safe environment: no spills, clutter or unnecessary equipment. Stretcher in lowest position, wheels locked, appropriate side rails in place. Provide visual cue, wrist band, yellow gown, etc. Monitor gait and stability. Monitor for mental status changes and reorient to person, place, and time. Review medications for side effects contributing to fall risk. Reinforce activity limits and safety measures with patient and family.

## 2021-07-28 NOTE — PROVIDER CONTACT NOTE (CRITICAL VALUE NOTIFICATION) - BACKGROUND
Pt Na was 110 upon admission, goal is to get to 119 by 11pm tonight. Overall goal is to increase by 6-8 points each day until levels normalize.

## 2021-07-28 NOTE — CONSULT NOTE ADULT - SUBJECTIVE AND OBJECTIVE BOX
Patient is a 61y old  Male who presents with a chief complaint of hyponatremia and hyperkalemia with EKG changes and bradycardia. (2021 06:35)      HPI:    60 y/o M HTN, DM complicated by neuropathy, EtOH liver cirrhosis, hx IVDU, toxic megacolon c/b pneumatosis s/p colectomy/ileostomy who presents with generalized weakness x 3 days. patient states that for the last three days patient has been having increased weakness, trouble ambulating, poor PO intake and difficulty urinating since Monday. patient states that since his ileostomy placement (one year ago) he has barely been eating as he doesn't like his ileostomy bag to fill up with lots of content and doesn't enjoy changing it constantly. he does drink waster however he states that he only drinks 2-3 glasses a day. in terms of his alcohol intake he was a heavy drinker until  and now only socially drinks and his last drink was . he states that since monday he has only been having "dribbles" of urine . patient was found to be retaining 2L of urine.   Nephrology was consulted for elevated Cr (to 1.7) ,hyponatremia to 110 and hyperkalemia to 8.2;           PAST MEDICAL & SURGICAL HISTORY:  DM (diabetes mellitus)    HTN (hypertension)    HLD (hyperlipidemia)    ETOH abuse    Anemia    No significant past surgical history          Allergies:  No Known Allergies      Home Medications:   chlorhexidine 2% Cloths 1 Application(s) Topical <User Schedule>  dextrose 40% Gel 15 Gram(s) Oral once  dextrose 5%. 1000 milliLiter(s) IV Continuous <Continuous>  dextrose 5%. 1000 milliLiter(s) IV Continuous <Continuous>  dextrose 5%. 1000 milliLiter(s) IV Continuous <Continuous>  dextrose 50% Injectable 25 Gram(s) IV Push once  dextrose 50% Injectable 12.5 Gram(s) IV Push once  dextrose 50% Injectable 25 Gram(s) IV Push once  glucagon  Injectable 1 milliGRAM(s) IntraMuscular once  heparin   Injectable 5000 Unit(s) SubCutaneous every 8 hours  insulin lispro (ADMELOG) corrective regimen sliding scale   SubCutaneous every 6 hours      Hospital Medications:   MEDICATIONS  (STANDING):  chlorhexidine 2% Cloths 1 Application(s) Topical <User Schedule>  dextrose 40% Gel 15 Gram(s) Oral once  dextrose 5%. 1000 milliLiter(s) (50 mL/Hr) IV Continuous <Continuous>  dextrose 5%. 1000 milliLiter(s) (100 mL/Hr) IV Continuous <Continuous>  dextrose 5%. 1000 milliLiter(s) (200 mL/Hr) IV Continuous <Continuous>  dextrose 50% Injectable 25 Gram(s) IV Push once  dextrose 50% Injectable 12.5 Gram(s) IV Push once  dextrose 50% Injectable 25 Gram(s) IV Push once  glucagon  Injectable 1 milliGRAM(s) IntraMuscular once  heparin   Injectable 5000 Unit(s) SubCutaneous every 8 hours  insulin lispro (ADMELOG) corrective regimen sliding scale   SubCutaneous every 6 hours      SOCIAL HISTORY:  Denies ETOh, Smoking,     Family History:  FAMILY HISTORY:        VITALS:  T(F): 97.8 (21 @ 10:00), Max: 98 (21 @ 22:29)  HR: 81 (21 @ 11:00)  BP: 114/82 (21 @ 11:00)  RR: 19 (21 @ 11:00)  SpO2: 98% (21 @ 11:00)  Wt(kg): --     @ 07:01  -   @ 07:00  --------------------------------------------------------  IN: 100 mL / OUT: 2445 mL / NET: -2345 mL     @ 07:01  -   @ 11:24  --------------------------------------------------------  IN: 1500 mL / OUT: 970 mL / NET: 530 mL      Height (cm): 167.6 ( @ 07:30)  Weight (kg): 77.2 ( @ 07:30)  BMI (kg/m2): 27.5 ( @ 07:30)  BSA (m2): 1.87 ( 07:30)  CAPILLARY BLOOD GLUCOSE      POCT Blood Glucose.: 193 mg/dL (2021 03:39)  POCT Blood Glucose.: 233 mg/dL (2021 03:00)  POCT Blood Glucose.: 141 mg/dL (2021 02:26)  POCT Blood Glucose.: 121 mg/dL (2021 01:31)  POCT Blood Glucose.: 243 mg/dL (2021 00:08)  POCT Blood Glucose.: 160 mg/dL (2021 23:11)      Review of Systems:  CONSTITUTIONAL: + faitgue  RESPIRATORY: No shortness of breath, cough  CARDIOVASCULAR: No Chest pain, shortness of breath, palpitations  GASTROINTESTINAL: No abdominal pain, nausea, vomiting, diarrhea  GENITOURINARY: + retention  NEUROLOGICAL:+ weakness    PHYSICAL EXAM:  GENERAL: Alert, awake, oriented x3   CHEST/LUNG: Bilateral clear breath sounds no rales, rhonchi or wheezing  HEART: Regular rate and rhythm, no murmur, no gallops, no rub   ABDOMEN: Soft, nontender, non distended  EXTREMITIES: no pedal edema  Neurology: AAOx3,     LABS:      118<LL>  |  83<L>  |  27<H>  ----------------------------<  346<H>  5.3   |  22  |  1.20    Ca    9.8      2021 09:33  Phos  4.5       Mg     2.0         TPro  8.4<H>  /  Alb  4.9  /  TBili  0.3  /  DBili      /  AST  27  /  ALT  15  /  AlkPhos  106      Creatinine Trend: 1.20 <--, 1.32 <--, 1.57 <--, 1.56 <--, See Note <--, 1.73 <--, 1.78 <--                        10.6   9.07  )-----------( 205      ( 2021 05:24 )             29.3     Urine Studies:  Urinalysis Basic - ( 2021 02:07 )    Color: Yellow / Appearance: Clear / S.015 / pH:   Gluc:  / Ketone: NEGATIVE  / Bili: Negative / Urobili: 0.2 E.U./dL   Blood:  / Protein: NEGATIVE mg/dL / Nitrite: NEGATIVE   Leuk Esterase: NEGATIVE / RBC:  / WBC    Sq Epi:  / Non Sq Epi:  / Bacteria:       Creatinine, Random Urine: 22 mg/dL ( @ 05:24)  Sodium, Random Urine: 23 mmol/L ( @ 05:24)  Osmolality, Random Urine: 182 mosm/kg ( @ 05:24)

## 2021-07-28 NOTE — CONSULT NOTE ADULT - ASSESSMENT
62 y/o M HTN, DM complicated by neuropathy, EtOH liver cirrhosis,toxic megacolon c/b pneumatosis s/p colectomy/ileostomy who presents with generalized weakness x 3 days found to be hyponatremic, hyperkalemic with an elevated Cr    Assessment/Plan:   #non-oliguric MEG   UA bland  patient was retaining 2L of urine pre-soliz placement  improving s/p soliz placement Cr down to 1.2 from 1.7 on arrival  monitor urine output  strict i's and o's  bicarb and lytes now stable  renal sonogram  avoid NSAIDS    #hyponatremia, hypovolemic., currently asymptomatic  etiology possibly 2/2 retention which is now resolving v. tea/toast diet though less likely    goal NA by 11pm tonight (7/28) is 119  given rapid rise in Na to c/w d5w@200cc/hr; may need standing DDAVP depending on urine output and rise of correction   to repeat BMP with urine Na and urine osm [id number]  monitor urine output hourly     #hyperkalemia  improving to 5.3 likely 2/2 urinary retention which has since resolved   patient on ACE-I at home; do not restart ace-i        Thank you for the opportunity to participate in the care of your patient. The nephrology service remains available to assist with any questions or concerns. Please feel free to reach us by paging the on-call nephrology fellow for urgent issues or as below.     Yumiko Santos DO  PGY-4, Nephrology Fellow   C: [phone]  P: [phone] 62 y/o M HTN, DM complicated by neuropathy, EtOH liver cirrhosis,toxic megacolon c/b pneumatosis s/p colectomy/ileostomy who presents with generalized weakness x 3 days found to be hyponatremic, hyperkalemic with an elevated Cr    Assessment/Plan:   #non-oliguric MEG   UA bland  patient was retaining 2L of urine pre-soliz placement  improving s/p soliz placement Cr down to 1.2 from 1.7 on arrival  monitor urine output  strict i's and o's  bicarb and lytes now stable  renal sonogram  avoid NSAIDS    #hyponatremia, hypovolemic., currently asymptomatic  etiology possibly 2/2 retention which is now resolving v. tea/toast diet though less likely    goal NA by 11pm tonight (7/28) is 119  given rapid rise in Na to c/w d5w@200cc/hr; may need standing DDAVP depending on urine output and rise of correction   to repeat BMP with urine Na and urine osm [id number]  monitor urine output hourly   no need for fluid restriction given rapid rise in Na    #hyperkalemia  improving to 5.3 likely 2/2 urinary retention which has since resolved   patient on ACE-I at home; do not restart ace-i        Thank you for the opportunity to participate in the care of your patient. The nephrology service remains available to assist with any questions or concerns. Please feel free to reach us by paging the on-call nephrology fellow for urgent issues or as below.     Yumiko Santos DO  PGY-4, Nephrology Fellow   C: [phone]  P: [phone]

## 2021-07-28 NOTE — ED ADULT NURSE NOTE - OBJECTIVE STATEMENT
60yo male co weakness 62yo male co weakness x24 hours, progressing to inability to walk. Baseline ambulatory with walker. Denies fevers/chills, SOB, CP, dizziness, headache, change in vision/LOC.

## 2021-07-29 LAB
ALBUMIN SERPL ELPH-MCNC: 4.4 G/DL — SIGNIFICANT CHANGE UP (ref 3.3–5)
ALP SERPL-CCNC: 89 U/L — SIGNIFICANT CHANGE UP (ref 40–120)
ALT FLD-CCNC: 12 U/L — SIGNIFICANT CHANGE UP (ref 10–45)
ANION GAP SERPL CALC-SCNC: 13 MMOL/L — SIGNIFICANT CHANGE UP (ref 5–17)
APPEARANCE UR: CLEAR — SIGNIFICANT CHANGE UP
AST SERPL-CCNC: 21 U/L — SIGNIFICANT CHANGE UP (ref 10–40)
BACTERIA # UR AUTO: PRESENT /HPF
BILIRUB SERPL-MCNC: 0.3 MG/DL — SIGNIFICANT CHANGE UP (ref 0.2–1.2)
BILIRUB UR-MCNC: ABNORMAL
BUN SERPL-MCNC: 17 MG/DL — SIGNIFICANT CHANGE UP (ref 7–23)
BUN SERPL-MCNC: 20 MG/DL — SIGNIFICANT CHANGE UP (ref 7–23)
BUN SERPL-MCNC: 21 MG/DL — SIGNIFICANT CHANGE UP (ref 7–23)
CALCIUM SERPL-MCNC: 9.1 MG/DL — SIGNIFICANT CHANGE UP (ref 8.4–10.5)
CALCIUM SERPL-MCNC: 9.2 MG/DL — SIGNIFICANT CHANGE UP (ref 8.4–10.5)
CALCIUM SERPL-MCNC: 9.3 MG/DL — SIGNIFICANT CHANGE UP (ref 8.4–10.5)
CHLORIDE SERPL-SCNC: 81 MMOL/L — LOW (ref 96–108)
CHLORIDE SERPL-SCNC: 83 MMOL/L — LOW (ref 96–108)
CHLORIDE SERPL-SCNC: 83 MMOL/L — LOW (ref 96–108)
CK SERPL-CCNC: 271 U/L — HIGH (ref 30–200)
CO2 SERPL-SCNC: 19 MMOL/L — LOW (ref 22–31)
CO2 SERPL-SCNC: 21 MMOL/L — LOW (ref 22–31)
CO2 SERPL-SCNC: 21 MMOL/L — LOW (ref 22–31)
COLOR SPEC: YELLOW — SIGNIFICANT CHANGE UP
CREAT SERPL-MCNC: 0.99 MG/DL — SIGNIFICANT CHANGE UP (ref 0.5–1.3)
CREAT SERPL-MCNC: 1.13 MG/DL — SIGNIFICANT CHANGE UP (ref 0.5–1.3)
CREAT SERPL-MCNC: 1.23 MG/DL — SIGNIFICANT CHANGE UP (ref 0.5–1.3)
CULTURE RESULTS: NO GROWTH — SIGNIFICANT CHANGE UP
DIFF PNL FLD: ABNORMAL
GLUCOSE BLDC GLUCOMTR-MCNC: 196 MG/DL — HIGH (ref 70–99)
GLUCOSE BLDC GLUCOMTR-MCNC: 198 MG/DL — HIGH (ref 70–99)
GLUCOSE BLDC GLUCOMTR-MCNC: 250 MG/DL — HIGH (ref 70–99)
GLUCOSE SERPL-MCNC: 140 MG/DL — HIGH (ref 70–99)
GLUCOSE SERPL-MCNC: 240 MG/DL — HIGH (ref 70–99)
GLUCOSE SERPL-MCNC: 255 MG/DL — HIGH (ref 70–99)
GLUCOSE UR QL: 100
HCT VFR BLD CALC: 30 % — LOW (ref 39–50)
HGB BLD-MCNC: 11 G/DL — LOW (ref 13–17)
KETONES UR-MCNC: NEGATIVE — SIGNIFICANT CHANGE UP
LEUKOCYTE ESTERASE UR-ACNC: ABNORMAL
MAGNESIUM SERPL-MCNC: 1.4 MG/DL — LOW (ref 1.6–2.6)
MCHC RBC-ENTMCNC: 30.1 PG — SIGNIFICANT CHANGE UP (ref 27–34)
MCHC RBC-ENTMCNC: 36.7 GM/DL — HIGH (ref 32–36)
MCV RBC AUTO: 82.2 FL — SIGNIFICANT CHANGE UP (ref 80–100)
NITRITE UR-MCNC: NEGATIVE — SIGNIFICANT CHANGE UP
NRBC # BLD: 0 /100 WBCS — SIGNIFICANT CHANGE UP (ref 0–0)
OSMOLALITY UR: 271 MOSM/KG — LOW (ref 300–900)
OSMOLALITY UR: 492 MOSM/KG — SIGNIFICANT CHANGE UP (ref 300–900)
OSMOLALITY UR: 568 MOSM/KG — SIGNIFICANT CHANGE UP (ref 300–900)
PH UR: 5.5 — SIGNIFICANT CHANGE UP (ref 5–8)
PHOSPHATE SERPL-MCNC: 3.4 MG/DL — SIGNIFICANT CHANGE UP (ref 2.5–4.5)
PLATELET # BLD AUTO: 207 K/UL — SIGNIFICANT CHANGE UP (ref 150–400)
POTASSIUM SERPL-MCNC: 5 MMOL/L — SIGNIFICANT CHANGE UP (ref 3.5–5.3)
POTASSIUM SERPL-MCNC: 5.1 MMOL/L — SIGNIFICANT CHANGE UP (ref 3.5–5.3)
POTASSIUM SERPL-MCNC: 5.5 MMOL/L — HIGH (ref 3.5–5.3)
POTASSIUM SERPL-SCNC: 5 MMOL/L — SIGNIFICANT CHANGE UP (ref 3.5–5.3)
POTASSIUM SERPL-SCNC: 5.1 MMOL/L — SIGNIFICANT CHANGE UP (ref 3.5–5.3)
POTASSIUM SERPL-SCNC: 5.5 MMOL/L — HIGH (ref 3.5–5.3)
PROT SERPL-MCNC: 7.5 G/DL — SIGNIFICANT CHANGE UP (ref 6–8.3)
PROT UR-MCNC: 100 MG/DL
RBC # BLD: 3.65 M/UL — LOW (ref 4.2–5.8)
RBC # FLD: 12.6 % — SIGNIFICANT CHANGE UP (ref 10.3–14.5)
RBC CASTS # UR COMP ASSIST: ABNORMAL /HPF
SODIUM SERPL-SCNC: 113 MMOL/L — CRITICAL LOW (ref 135–145)
SODIUM SERPL-SCNC: 117 MMOL/L — CRITICAL LOW (ref 135–145)
SODIUM SERPL-SCNC: 117 MMOL/L — CRITICAL LOW (ref 135–145)
SODIUM UR-SCNC: 38 MMOL/L — SIGNIFICANT CHANGE UP
SODIUM UR-SCNC: <20 MMOL/L — SIGNIFICANT CHANGE UP
SODIUM UR-SCNC: <20 MMOL/L — SIGNIFICANT CHANGE UP
SP GR SPEC: >=1.03 — SIGNIFICANT CHANGE UP (ref 1–1.03)
SPECIMEN SOURCE: SIGNIFICANT CHANGE UP
UROBILINOGEN FLD QL: 0.2 E.U./DL — SIGNIFICANT CHANGE UP
WBC # BLD: 9.64 K/UL — SIGNIFICANT CHANGE UP (ref 3.8–10.5)
WBC # FLD AUTO: 9.64 K/UL — SIGNIFICANT CHANGE UP (ref 3.8–10.5)
WBC UR QL: < 5 /HPF — SIGNIFICANT CHANGE UP

## 2021-07-29 PROCEDURE — 99232 SBSQ HOSP IP/OBS MODERATE 35: CPT

## 2021-07-29 PROCEDURE — 71045 X-RAY EXAM CHEST 1 VIEW: CPT | Mod: 26

## 2021-07-29 PROCEDURE — 99233 SBSQ HOSP IP/OBS HIGH 50: CPT | Mod: GC

## 2021-07-29 RX ORDER — INSULIN LISPRO 100/ML
VIAL (ML) SUBCUTANEOUS
Refills: 0 | Status: DISCONTINUED | OUTPATIENT
Start: 2021-07-29 | End: 2021-08-01

## 2021-07-29 RX ORDER — LEVOTHYROXINE SODIUM 125 MCG
50 TABLET ORAL DAILY
Refills: 0 | Status: DISCONTINUED | OUTPATIENT
Start: 2021-07-29 | End: 2021-08-01

## 2021-07-29 RX ORDER — AMLODIPINE BESYLATE 2.5 MG/1
10 TABLET ORAL DAILY
Refills: 0 | Status: DISCONTINUED | OUTPATIENT
Start: 2021-07-29 | End: 2021-08-01

## 2021-07-29 RX ORDER — MAGNESIUM SULFATE 500 MG/ML
4 VIAL (ML) INJECTION ONCE
Refills: 0 | Status: DISCONTINUED | OUTPATIENT
Start: 2021-07-29 | End: 2021-07-29

## 2021-07-29 RX ORDER — ATORVASTATIN CALCIUM 80 MG/1
10 TABLET, FILM COATED ORAL AT BEDTIME
Refills: 0 | Status: DISCONTINUED | OUTPATIENT
Start: 2021-07-29 | End: 2021-08-01

## 2021-07-29 RX ORDER — PANTOPRAZOLE SODIUM 20 MG/1
40 TABLET, DELAYED RELEASE ORAL
Refills: 0 | Status: DISCONTINUED | OUTPATIENT
Start: 2021-07-29 | End: 2021-08-01

## 2021-07-29 RX ORDER — MAGNESIUM SULFATE 500 MG/ML
2 VIAL (ML) INJECTION
Refills: 0 | Status: COMPLETED | OUTPATIENT
Start: 2021-07-29 | End: 2021-07-29

## 2021-07-29 RX ORDER — INSULIN LISPRO 100/ML
VIAL (ML) SUBCUTANEOUS AT BEDTIME
Refills: 0 | Status: DISCONTINUED | OUTPATIENT
Start: 2021-07-29 | End: 2021-08-01

## 2021-07-29 RX ORDER — SODIUM CHLORIDE 5 G/100ML
500 INJECTION, SOLUTION INTRAVENOUS
Refills: 0 | Status: DISCONTINUED | OUTPATIENT
Start: 2021-07-29 | End: 2021-07-29

## 2021-07-29 RX ORDER — INSULIN DETEMIR 100/ML (3)
1 INSULIN PEN (ML) SUBCUTANEOUS
Qty: 0 | Refills: 0 | DISCHARGE

## 2021-07-29 RX ADMIN — Medication 0: at 23:19

## 2021-07-29 RX ADMIN — Medication 50 GRAM(S): at 11:25

## 2021-07-29 RX ADMIN — HEPARIN SODIUM 5000 UNIT(S): 5000 INJECTION INTRAVENOUS; SUBCUTANEOUS at 14:51

## 2021-07-29 RX ADMIN — ATORVASTATIN CALCIUM 10 MILLIGRAM(S): 80 TABLET, FILM COATED ORAL at 23:20

## 2021-07-29 RX ADMIN — Medication 4: at 16:37

## 2021-07-29 RX ADMIN — CHLORHEXIDINE GLUCONATE 1 APPLICATION(S): 213 SOLUTION TOPICAL at 06:32

## 2021-07-29 RX ADMIN — Medication 50 GRAM(S): at 08:00

## 2021-07-29 RX ADMIN — SODIUM CHLORIDE 50 MILLILITER(S): 5 INJECTION, SOLUTION INTRAVENOUS at 14:50

## 2021-07-29 RX ADMIN — HEPARIN SODIUM 5000 UNIT(S): 5000 INJECTION INTRAVENOUS; SUBCUTANEOUS at 06:31

## 2021-07-29 RX ADMIN — Medication 2: at 11:38

## 2021-07-29 RX ADMIN — HEPARIN SODIUM 5000 UNIT(S): 5000 INJECTION INTRAVENOUS; SUBCUTANEOUS at 23:19

## 2021-07-29 NOTE — PROVIDER CONTACT NOTE (CRITICAL VALUE NOTIFICATION) - NAME OF MD/NP/PA/DO NOTIFIED:
[place]
Dr Ramos
Dr. Lopes
[place]
Dr. Beard
Dr. Lopes
Dr. Montero
Dr. Raphael
Dr. Raphael
Dr. Velásquez
MD Raphael

## 2021-07-29 NOTE — PROGRESS NOTE ADULT - ASSESSMENT
CARDIOVASCULAR  #  PULMONARY  #  GASTROINTESTINAL  #  RENAL  #  Neurology  #  INFECTIOUS DISEASE  #  ENDOCRINE  #  HEME  #  FLUIDS/ELECTROLYTES/NUTRITION  -IVF  -Monitor, Replete to K>4 and Mg>2  -Diet  PROPHYLAXIS  -DVT  -GI    DISPO  CODE STATUS 60 y/o M HTN, DM complicated by neuropathy, EtOH liver cirrhosis, hx IVDU, toxic megacolon c/b pneumatosis s/p colectomy/ileostomy who presents with generalized weakness x 3 days found to have MEG, hyponatremia, hyperkalemia w/ EKG changes and bradycardia admitted to ICU for management of hyponatremia.     NEURO  # Alert and oriented     CARDIOVASCULAR  #Bradycardia  - Bradycardia and EKG changes on presentation. Likely 2/2 hyperkalemia. Now resolved  - Continue to monitor EKG, electrolytes. Replete as needed      #HTN  - Takes lisinopril outpatient     PULMONARY  #  GASTROINTESTINAL  #  RENAL  #Hyponatremia  - NA down from 118 -> 117. Continue to hold D5.    #  INFECTIOUS DISEASE  #  ENDOCRINE  #  HEME  #  FLUIDS/ELECTROLYTES/NUTRITION  -IVF  -Monitor, Replete to K>4 and Mg>2  -Diet  PROPHYLAXIS  -DVT  -GI    DISPO  CODE STATUS 60 y/o M HTN, DM complicated by neuropathy, EtOH liver cirrhosis, hx IVDU, toxic megacolon c/b pneumatosis s/p colectomy/ileostomy who presents with generalized weakness x 3 days found to have MEG, hyponatremia, hyperkalemia w/ EKG changes and bradycardia admitted to ICU for management of hyponatremia.     NEURO  # Alert and oriented     CARDIOVASCULAR  #Bradycardia  - Bradycardia and EKG changes on presentation. Likely 2/2 hyperkalemia. Now resolved  - Continue to monitor EKG, electrolytes. Replete as needed      #HTN  - Takes lisinopril outpatient     PULMONARY  #Saturating well on room air     GASTROINTESTINAL  #  RENAL  #Hyponatremia  - NA down from 118 -> 117. Continue to hold D5.    #  INFECTIOUS DISEASE  #  ENDOCRINE  #  HEME  #  FLUIDS/ELECTROLYTES/NUTRITION  -IVF  -Monitor, Replete to K>4 and Mg>2  -Diet  PROPHYLAXIS  -DVT  -GI    DISPO  CODE STATUS 60 y/o M HTN, DM complicated by neuropathy, EtOH liver cirrhosis, hx IVDU, toxic megacolon c/b pneumatosis s/p colectomy/ileostomy who presents with generalized weakness x 3 days found to have MEG, hyponatremia, hyperkalemia w/ EKG changes and bradycardia admitted to ICU for management of hyponatremia.     NEURO  # Alert and oriented     CARDIOVASCULAR  #Bradycardia  - Bradycardia and EKG changes on presentation. Likely 2/2 hyperkalemia. Now resolved  - Continue to monitor EKG, electrolytes. Replete as needed      #HTN  - Takes lisinopril outpatient   - No antihypertensives while inpatient     PULMONARY  #Saturating well on room air     GASTROINTESTINAL  #Ostomy s/o toxic megacolon and colectomy   - Monitor ostomy output     RENAL  #Hyponatremia  - NA down from 118 -> 117. Continue to hold D5.  - Given 50 ml Hypertonic saline  - Restrict free water   - Urine output improving     ENDOCRINE  - ISS     FLUIDS/ELECTROLYTES/NUTRITION  -IVF: N/A   -Monitor, Replete to K>4 and Mg>2  -Diet: Consistent Carb  PROPHYLAXIS  -DVT  -GI    DISPO  CODE STATUS

## 2021-07-29 NOTE — PROVIDER CONTACT NOTE (CRITICAL VALUE NOTIFICATION) - PERSON GIVING RESULT:
Jolanta in Blood Gas
Zeina in Blood Gas
hubert
JOHN Cowan
Lab
Rehan in Chemistry
lab
Rehan BECERRA
lab

## 2021-07-29 NOTE — PROVIDER CONTACT NOTE (CRITICAL VALUE NOTIFICATION) - TEST AND RESULT REPORTED:
Na 116, K 7
Na 115, K 7.1
Na 118
K 6, Na 115
K 8.2 Na 112
K8.0 Na110
hyponatremia Na 115
sodium 117
Na 117
serum sodium 118
serum sodium 118
na 118
Na = 115

## 2021-07-29 NOTE — PROGRESS NOTE ADULT - SUBJECTIVE AND OBJECTIVE BOX
Patient is a 61y old  Male who presents with a chief complaint of hyponatremia and hyperkalemia with EKG changes and bradycardia. (2021 11:22)      INTERVAL/ OVERNIGHT EVENTS: Hyperkalemic to 5.9 overnight. Given [facility] Vital Signs Last 24 Hrs  T(C): 37.4 (2021 00:39), Max: 37.4 (2021 00:39)  T(F): 99.4 (2021 00:39), Max: 99.4 (2021 00:39)  HR: 79 (2021 05:00) (63 - 90)  BP: 137/65 (2021 05:00) (110/60 - 155/69)  BP(mean): 94 (2021 05:00) (79 - 128)  ABP: --  ABP(mean): --  RR: 22 (2021 05:00) (12 - 33)  SpO2: 97% (2021 05:00) (97% - 99%)    I&O's Summary    2021 07:01  -  2021 07:00  --------------------------------------------------------  IN: 100 mL / OUT: 2445 mL / NET: -2345 mL    2021 07:01  -  2021 06:02  --------------------------------------------------------  IN: 2500 mL / OUT: 2017 mL / NET: 483 mL          LABS:                        11.0   9.64  )-----------( 207      ( 2021 05:40 )             30.0         118<LL>  |  82<L>  |  20  ----------------------------<  136<H>  5.9<H>   |  23  |  0.96    Ca    9.4      2021 20:46  Phos  4.5     -  Mg     2.0     -    TPro  8.4<H>  /  Alb  4.9  /  TBili  0.3  /  DBili  x   /  AST  27  /  ALT  15  /  AlkPhos  106  07-    PT/INR - ( 2021 23:10 )   PT: 11.1 sec;   INR: 0.92          PTT - ( 2021 23:10 )  PTT:30.9 sec  Urinalysis Basic - ( 2021 02:07 )    Color: Yellow / Appearance: Clear / S.015 / pH: x  Gluc: x / Ketone: NEGATIVE  / Bili: Negative / Urobili: 0.2 E.U./dL   Blood: x / Protein: NEGATIVE mg/dL / Nitrite: NEGATIVE   Leuk Esterase: NEGATIVE / RBC: x / WBC x   Sq Epi: x / Non Sq Epi: x / Bacteria: x      CAPILLARY BLOOD GLUCOSE      POCT Blood Glucose.: 139 mg/dL (2021 23:13)  POCT Blood Glucose.: 138 mg/dL (2021 18:43)  POCT Blood Glucose.: 244 mg/dL (2021 11:31)        RADIOLOGY & ADDITIONAL TESTS:    Consultant(s) Notes Reviewed:  [x ] YES  [ ] NO    MEDICATIONS  (STANDING):  chlorhexidine 2% Cloths 1 Application(s) Topical <User Schedule>  dextrose 40% Gel 15 Gram(s) Oral once  dextrose 5%. 1000 milliLiter(s) (50 mL/Hr) IV Continuous <Continuous>  dextrose 5%. 1000 milliLiter(s) (100 mL/Hr) IV Continuous <Continuous>  dextrose 50% Injectable 25 Gram(s) IV Push once  dextrose 50% Injectable 12.5 Gram(s) IV Push once  dextrose 50% Injectable 25 Gram(s) IV Push once  glucagon  Injectable 1 milliGRAM(s) IntraMuscular once  heparin   Injectable 5000 Unit(s) SubCutaneous every 8 hours  insulin lispro (ADMELOG) corrective regimen sliding scale   SubCutaneous three times a day before meals  insulin lispro (ADMELOG) corrective regimen sliding scale   SubCutaneous at bedtime    MEDICATIONS  (PRN):      PHYSICAL EXAM:  GENERAL:   HEAD:  Atraumatic, Normocephalic  EYES: EOMI, PERRLA, conjunctiva and sclera clear  NECK: Supple, No JVD, Normal thyroid, no enlarged nodes  NERVOUS SYSTEM:  Alert & Awake.   CHEST/LUNG: B/L good air entry; No rales, rhonchi, or wheezing  HEART: S1S2 normal, no S3, Regular rate and rhythm; No murmurs  ABDOMEN: Soft, Nontender, Nondistended; Bowel sounds present  EXTREMITIES:  2+ Peripheral Pulses, No clubbing, cyanosis, or edema  LYMPH: No lymphadenopathy noted  SKIN: No rashes or lesions    Care Discussed with Consultants/Other Providers [ x] YES  [ ] NO Patient is a 61y old  Male who presents with a chief complaint of hyponatremia and hyperkalemia with EKG changes and bradycardia. (2021 11:22)      INTERVAL/ OVERNIGHT EVENTS: Hyperkalemic to 5.9 overnight. Given [facility] Vital Signs Last 24 Hrs  T(C): 37.4 (2021 00:39), Max: 37.4 (2021 00:39)  T(F): 99.4 (2021 00:39), Max: 99.4 (2021 00:39)  HR: 79 (2021 05:00) (63 - 90)  BP: 137/65 (2021 05:00) (110/60 - 155/69)  BP(mean): 94 (2021 05:00) (79 - 128)  ABP: --  ABP(mean): --  RR: 22 (2021 05:00) (12 - 33)  SpO2: 97% (2021 05:00) (97% - 99%)    I&O's Summary    2021 07:01  -  2021 07:00  --------------------------------------------------------  IN: 100 mL / OUT: 2445 mL / NET: -2345 mL    2021 07:01  -  2021 06:02  --------------------------------------------------------  IN: 2500 mL / OUT: 2017 mL / NET: 483 mL          LABS:                        11.0   9.64  )-----------( 207      ( 2021 05:40 )             30.0         118<LL>  |  82<L>  |  20  ----------------------------<  136<H>  5.9<H>   |  23  |  0.96    Ca    9.4      2021 20:46  Phos  4.5     07-  Mg     2.0     -    TPro  8.4<H>  /  Alb  4.9  /  TBili  0.3  /  DBili  x   /  AST  27  /  ALT  15  /  AlkPhos  106  07-    PT/INR - ( 2021 23:10 )   PT: 11.1 sec;   INR: 0.92          PTT - ( 2021 23:10 )  PTT:30.9 sec  Urinalysis Basic - ( 2021 02:07 )    Color: Yellow / Appearance: Clear / S.015 / pH: x  Gluc: x / Ketone: NEGATIVE  / Bili: Negative / Urobili: 0.2 E.U./dL   Blood: x / Protein: NEGATIVE mg/dL / Nitrite: NEGATIVE   Leuk Esterase: NEGATIVE / RBC: x / WBC x   Sq Epi: x / Non Sq Epi: x / Bacteria: x      CAPILLARY BLOOD GLUCOSE      POCT Blood Glucose.: 139 mg/dL (2021 23:13)  POCT Blood Glucose.: 138 mg/dL (2021 18:43)  POCT Blood Glucose.: 244 mg/dL (2021 11:31)        RADIOLOGY & ADDITIONAL TESTS:    Consultant(s) Notes Reviewed:  [x ] YES  [ ] NO    MEDICATIONS  (STANDING):  chlorhexidine 2% Cloths 1 Application(s) Topical <User Schedule>  dextrose 40% Gel 15 Gram(s) Oral once  dextrose 5%. 1000 milliLiter(s) (50 mL/Hr) IV Continuous <Continuous>  dextrose 5%. 1000 milliLiter(s) (100 mL/Hr) IV Continuous <Continuous>  dextrose 50% Injectable 25 Gram(s) IV Push once  dextrose 50% Injectable 12.5 Gram(s) IV Push once  dextrose 50% Injectable 25 Gram(s) IV Push once  glucagon  Injectable 1 milliGRAM(s) IntraMuscular once  heparin   Injectable 5000 Unit(s) SubCutaneous every 8 hours  insulin lispro (ADMELOG) corrective regimen sliding scale   SubCutaneous three times a day before meals  insulin lispro (ADMELOG) corrective regimen sliding scale   SubCutaneous at bedtime    MEDICATIONS  (PRN):      PHYSICAL EXAM:  GENERAL: Sitting in chair at bedside. No acute distress.   HEAD:  Atraumatic, Normocephalic  EYES: EOMI, PERRLA, conjunctiva and sclera clear  NECK: Supple, No JVD  NERVOUS SYSTEM:  A&O x 4   CHEST/LUNG: B/L good air entry; No rales, rhonchi, or wheezing  HEART: S1S2 normal, no S3, Regular rate and rhythm; No murmurs  ABDOMEN: Soft, Nontender, Nondistended; Bowel sounds present, ostomy clean and in tact   EXTREMITIES:  2+ Peripheral Pulses, No clubbing, cyanosis, or edema  : Guajardo in place, red appearing urine in tube   SKIN: No rashes or lesions  IV: Left peripheral in place (placed )     Care Discussed with Consultants/Other Providers [ x] YES  [ ] NO Patient is a 61y old  Male who presents with a chief complaint of hyponatremia and hyperkalemia with EKG changes and bradycardia. (2021 11:22)      INTERVAL/ OVERNIGHT EVENTS: Hyperkalemic to 5.9 overnight. Given lokelma. Patient seen at bedside in AM. No complaints.         ICU Vital Signs Last 24 Hrs  T(C): 37.4 (2021 00:39), Max: 37.4 (2021 00:39)  T(F): 99.4 (2021 00:39), Max: 99.4 (2021 00:39)  HR: 79 (2021 05:00) (63 - 90)  BP: 137/65 (2021 05:00) (110/60 - 155/69)  BP(mean): 94 (2021 05:00) (79 - 128)  ABP: --  ABP(mean): --  RR: 22 (2021 05:00) (12 - 33)  SpO2: 97% (2021 05:00) (97% - 99%)    I&O's Summary    2021 07:01  -  2021 07:00  --------------------------------------------------------  IN: 100 mL / OUT: 2445 mL / NET: -2345 mL    2021 07:01  -  2021 06:02  --------------------------------------------------------  IN: 2500 mL / OUT: 2017 mL / NET: 483 mL          LABS:                        11.0   9.64  )-----------( 207      ( 2021 05:40 )             30.0         118<LL>  |  82<L>  |  20  ----------------------------<  136<H>  5.9<H>   |  23  |  0.96    Ca    9.4      2021 20:46  Phos  4.5     -  Mg     2.0     -    TPro  8.4<H>  /  Alb  4.9  /  TBili  0.3  /  DBili  x   /  AST  27  /  ALT  15  /  AlkPhos  106  -    PT/INR - ( 2021 23:10 )   PT: 11.1 sec;   INR: 0.92          PTT - ( 2021 23:10 )  PTT:30.9 sec  Urinalysis Basic - ( 2021 02:07 )    Color: Yellow / Appearance: Clear / S.015 / pH: x  Gluc: x / Ketone: NEGATIVE  / Bili: Negative / Urobili: 0.2 E.U./dL   Blood: x / Protein: NEGATIVE mg/dL / Nitrite: NEGATIVE   Leuk Esterase: NEGATIVE / RBC: x / WBC x   Sq Epi: x / Non Sq Epi: x / Bacteria: x      CAPILLARY BLOOD GLUCOSE      POCT Blood Glucose.: 139 mg/dL (2021 23:13)  POCT Blood Glucose.: 138 mg/dL (2021 18:43)  POCT Blood Glucose.: 244 mg/dL (2021 11:31)        RADIOLOGY & ADDITIONAL TESTS:    Consultant(s) Notes Reviewed:  [x ] YES  [ ] NO    MEDICATIONS  (STANDING):  chlorhexidine 2% Cloths 1 Application(s) Topical <User Schedule>  dextrose 40% Gel 15 Gram(s) Oral once  dextrose 5%. 1000 milliLiter(s) (50 mL/Hr) IV Continuous <Continuous>  dextrose 5%. 1000 milliLiter(s) (100 mL/Hr) IV Continuous <Continuous>  dextrose 50% Injectable 25 Gram(s) IV Push once  dextrose 50% Injectable 12.5 Gram(s) IV Push once  dextrose 50% Injectable 25 Gram(s) IV Push once  glucagon  Injectable 1 milliGRAM(s) IntraMuscular once  heparin   Injectable 5000 Unit(s) SubCutaneous every 8 hours  insulin lispro (ADMELOG) corrective regimen sliding scale   SubCutaneous three times a day before meals  insulin lispro (ADMELOG) corrective regimen sliding scale   SubCutaneous at bedtime    MEDICATIONS  (PRN):      PHYSICAL EXAM:  GENERAL: Sitting in chair at bedside. No acute distress.   HEAD:  Atraumatic, Normocephalic  EYES: EOMI, PERRLA, conjunctiva and sclera clear  NECK: Supple, No JVD  NERVOUS SYSTEM:  A&O x 4   CHEST/LUNG: B/L good air entry; No rales, rhonchi, or wheezing  HEART: S1S2 normal, no S3, Regular rate and rhythm; No murmurs  ABDOMEN: Soft, Nontender, Nondistended; Bowel sounds present, ostomy clean and in tact   EXTREMITIES:  2+ Peripheral Pulses, No clubbing, cyanosis, or edema  : Guajardo in place, red appearing urine in tube   SKIN: No rashes or lesions  IV: Left peripheral in place (placed )     Care Discussed with Consultants/Other Providers [ x] YES  [ ] NO Patient is a 61y old  Male who presents with a chief complaint of hyponatremia and hyperkalemia with EKG changes and bradycardia. (2021 11:22)      INTERVAL/ OVERNIGHT EVENTS: Hyperkalemic to 5.9 overnight. Given lokelma. Patient seen at bedside in AM. No complaints.        ICU Vital Signs Last 24 Hrs  T(C): 37.4 (2021 00:39), Max: 37.4 (2021 00:39)  T(F): 99.4 (2021 00:39), Max: 99.4 (2021 00:39)  HR: 79 (2021 05:00) (63 - 90)  BP: 137/65 (2021 05:00) (110/60 - 155/69)  BP(mean): 94 (2021 05:00) (79 - 128)  ABP: --  ABP(mean): --  RR: 22 (2021 05:00) (12 - 33)  SpO2: 97% (2021 05:00) (97% - 99%)    I&O's Summary    2021 07:01  -  2021 07:00  --------------------------------------------------------  IN: 100 mL / OUT: 2445 mL / NET: -2345 mL    2021 07:01  -  2021 06:02  --------------------------------------------------------  IN: 2500 mL / OUT: 2017 mL / NET: 483 mL          LABS:                        11.0   9.64  )-----------( 207      ( 2021 05:40 )             30.0         118<LL>  |  82<L>  |  20  ----------------------------<  136<H>  5.9<H>   |  23  |  0.96    Ca    9.4      2021 20:46  Phos  4.5     -  Mg     2.0     -    TPro  8.4<H>  /  Alb  4.9  /  TBili  0.3  /  DBili  x   /  AST  27  /  ALT  15  /  AlkPhos  106  -    PT/INR - ( 2021 23:10 )   PT: 11.1 sec;   INR: 0.92          PTT - ( 2021 23:10 )  PTT:30.9 sec  Urinalysis Basic - ( 2021 02:07 )    Color: Yellow / Appearance: Clear / S.015 / pH: x  Gluc: x / Ketone: NEGATIVE  / Bili: Negative / Urobili: 0.2 E.U./dL   Blood: x / Protein: NEGATIVE mg/dL / Nitrite: NEGATIVE   Leuk Esterase: NEGATIVE / RBC: x / WBC x   Sq Epi: x / Non Sq Epi: x / Bacteria: x      CAPILLARY BLOOD GLUCOSE      POCT Blood Glucose.: 139 mg/dL (2021 23:13)  POCT Blood Glucose.: 138 mg/dL (2021 18:43)  POCT Blood Glucose.: 244 mg/dL (2021 11:31)        RADIOLOGY & ADDITIONAL TESTS:    Consultant(s) Notes Reviewed:  [x ] YES  [ ] NO    MEDICATIONS  (STANDING):  chlorhexidine 2% Cloths 1 Application(s) Topical <User Schedule>  dextrose 40% Gel 15 Gram(s) Oral once  dextrose 5%. 1000 milliLiter(s) (50 mL/Hr) IV Continuous <Continuous>  dextrose 5%. 1000 milliLiter(s) (100 mL/Hr) IV Continuous <Continuous>  dextrose 50% Injectable 25 Gram(s) IV Push once  dextrose 50% Injectable 12.5 Gram(s) IV Push once  dextrose 50% Injectable 25 Gram(s) IV Push once  glucagon  Injectable 1 milliGRAM(s) IntraMuscular once  heparin   Injectable 5000 Unit(s) SubCutaneous every 8 hours  insulin lispro (ADMELOG) corrective regimen sliding scale   SubCutaneous three times a day before meals  insulin lispro (ADMELOG) corrective regimen sliding scale   SubCutaneous at bedtime    MEDICATIONS  (PRN):      PHYSICAL EXAM:  GENERAL: Sitting in chair at bedside. No acute distress.   HEAD:  Atraumatic, Normocephalic  EYES: EOMI, PERRLA, conjunctiva and sclera clear  NECK: Supple, No JVD  NERVOUS SYSTEM:  A&O x 4   CHEST/LUNG: B/L good air entry; No rales, rhonchi, or wheezing  HEART: S1S2 normal, no S3, Regular rate and rhythm; No murmurs  ABDOMEN: Soft, Nontender, Nondistended; Bowel sounds present, ostomy clean and in tact   EXTREMITIES:  2+ Peripheral Pulses, No clubbing, cyanosis, or edema  : Guajardo in place, bloody urine draining from Guajardo    SKIN: No rashes or lesions  IV: Left peripheral in place (placed )     Care Discussed with Consultants/Other Providers [ x] YES  [ ] NO

## 2021-07-29 NOTE — PROGRESS NOTE ADULT - ASSESSMENT
60 y/o M HTN, DM complicated by neuropathy, EtOH liver cirrhosis,toxic megacolon c/b pneumatosis s/p colectomy/ileostomy who presents with generalized weakness x 3 days found to be hyponatremic, hyperkalemic with an elevated Cr    Assessment/Plan:   #non-oliguric MEG   improved Cr is back to baseline this AM  UA bland  monitor urine output  strict i's and o's  bicarb and lytes now stable  renal sonogram  avoid NSAIDS    #hyponatremia, hypovolemic., currently asymptomatic  etiology possibly 2/2 retention which is now resolving v. tea/toast diet though less likely    Na this  with urine Na <20  would give 500cc bolus of NS or commence salt tabs 1gram TID  would repeat BMP w/ urine Na Urine Osm @2pm     #hyperkalemia  resolved  patient on ACE-I at home; do not restart ace-i   62 y/o M HTN, DM complicated by neuropathy, EtOH liver cirrhosis,toxic megacolon c/b pneumatosis s/p colectomy/ileostomy who presents with generalized weakness x 3 days found to be hyponatremic, hyperkalemic with an elevated Cr    Assessment/Plan:   #non-oliguric MEG   improved Cr is back to baseline this AM  UA bland  monitor urine output  strict i's and o's  bicarb and lytes now stable  renal sonogram  avoid NSAIDS    #hyponatremia, hypovolemic., currently asymptomatic  etiology possibly 2/2 retention which is now resolving v. tea/toast diet though less likely    Na this  with urine Na <20  would give 500cc bolus of NS or commence salt tabs 1gram TID  would repeat BMP w/ urine Na Urine Osm @2pm   goal 125-126 today    #hyperkalemia  resolved  patient on ACE-I at home; do not restart ace-i

## 2021-07-29 NOTE — PROGRESS NOTE ADULT - SUBJECTIVE AND OBJECTIVE BOX
Patient is a 61y Male seen and evaluated at bedside. no acute events overnight patient states he is feeling better and is eating/drinking more; however he is upset hes only getting Gabapentin once a day;   Na this AM: 117 down from 118 yesterday (goal for 11pm on  was 119)  urine output: 2.1L/24 hours      Meds:    chlorhexidine 2% Cloths 1 <User Schedule>  dextrose 40% Gel 15 once  dextrose 5%. 1000 <Continuous>  dextrose 5%. 1000 <Continuous>  dextrose 50% Injectable 25 once  dextrose 50% Injectable 12.5 once  dextrose 50% Injectable 25 once  glucagon  Injectable 1 once  heparin   Injectable 5000 every 8 hours  insulin lispro (ADMELOG) corrective regimen sliding scale  three times a day before meals  insulin lispro (ADMELOG) corrective regimen sliding scale  at bedtime  magnesium sulfate  IVPB 2 every 1 hour      T(C): , Max: 37.4 (21 @ 00:39)  T(F): , Max: 99.4 (21 @ 00:39)  HR: 93 (21 @ 07:00)  BP: 143/71 (21 @ 07:00)  BP(mean): 93 (21 @ 07:00)  RR: 20 (21 @ 07:00)  SpO2: 99% (21 @ 07:00)  Wt(kg): --     @ 07:01  -   @ 07:00  --------------------------------------------------------  IN: 2600 mL / OUT: 2162 mL / NET: 438 mL          Review of Systems:  CONSTITUTIONAL: No fever or chills,   RESPIRATORY: No shortness of breath, cough, h  CARDIOVASCULAR: No chest pain or shortness of breath  GASTROINTESTINAL: No abdominal  pain, No nausea or vomiting,   GENITOURINARY: soliz in place  NEUROLOGICAL: No headaches or blurred vision  SKIN: No skin rashes   MUSCULOSKELETAL: + weakness (LE) and neuropathy      PHYSICAL EXAM:  GENERAL: well-developed, well nourished, alert, no acute distress at present  CHEST/LUNG: Clear to auscultation bilaterally no rales, rhonchi or wheezing  HEART: normal S1S2, RRR  ABDOMEN: Soft, Nontender, +BS,   : soliz in place   EXTREMITIES: No clubbing, cyanosis, or edema         LABS:                        11.0   9.64  )-----------( 207      ( 2021 05:40 )             30.0         117<LL>  |  83<L>  |  17  ----------------------------<  140<H>  5.0   |  21<L>  |  0.99    Ca    9.3      2021 05:40  Phos  3.4       Mg     1.4         TPro  7.5  /  Alb  4.4  /  TBili  0.3  /  DBili  x   /  AST  21  /  ALT  12  /  AlkPhos  89        PT/INR - ( 2021 23:10 )   PT: 11.1 sec;   INR: 0.92          PTT - ( 2021 23:10 )  PTT:30.9 sec  Urinalysis Basic - ( 2021 02:07 )    Color: Yellow / Appearance: Clear / S.015 / pH: x  Gluc: x / Ketone: NEGATIVE  / Bili: Negative / Urobili: 0.2 E.U./dL   Blood: x / Protein: NEGATIVE mg/dL / Nitrite: NEGATIVE   Leuk Esterase: NEGATIVE / RBC: x / WBC x   Sq Epi: x / Non Sq Epi: x / Bacteria: x      Sodium, Random Urine: <20 mmol/L ( @ 06:16)  Osmolality, Random Urine: 568 mosm/kg ( @ 06:16)  Sodium, Random Urine: 20 mmol/L ( @ 20:47)  Osmolality, Random Urine: 557 mosm/kg ( @ 20:47)  Potassium, Random Urine: 85 mmol/L ( @ 20:47)  Sodium, Random Urine: <20 mmol/L ( @ 17:04)  Osmolality, Random Urine: 492 mosm/kg ( @ 17:04)  Osmolality, Random Urine: 461 mosm/kg ( @ 14:29)  Sodium, Random Urine: <20 mmol/L ( @ 14:26)  Sodium, Random Urine: <20 mmol/L ( @ 12:03)  Osmolality, Random Urine: 442 mosm/kg ( @ 12:03)  Osmolality, Random Urine: 348 mosm/kg ( @ 10:51)  Sodium, Random Urine: 23 mmol/L ( @ 10:51)  Creatinine, Random Urine: 22 mg/dL ( @ 05:24)  Sodium, Random Urine: 23 mmol/L ( @ 05:24)  Osmolality, Random Urine: 182 mosm/kg ( @ 05:24)        RADIOLOGY & ADDITIONAL STUDIES:

## 2021-07-30 DIAGNOSIS — E87.1 HYPO-OSMOLALITY AND HYPONATREMIA: ICD-10-CM

## 2021-07-30 DIAGNOSIS — I10 ESSENTIAL (PRIMARY) HYPERTENSION: ICD-10-CM

## 2021-07-30 DIAGNOSIS — Z93.3 COLOSTOMY STATUS: ICD-10-CM

## 2021-07-30 DIAGNOSIS — E11.40 TYPE 2 DIABETES MELLITUS WITH DIABETIC NEUROPATHY, UNSPECIFIED: ICD-10-CM

## 2021-07-30 DIAGNOSIS — E78.5 HYPERLIPIDEMIA, UNSPECIFIED: ICD-10-CM

## 2021-07-30 DIAGNOSIS — R63.8 OTHER SYMPTOMS AND SIGNS CONCERNING FOOD AND FLUID INTAKE: ICD-10-CM

## 2021-07-30 DIAGNOSIS — E87.5 HYPERKALEMIA: ICD-10-CM

## 2021-07-30 DIAGNOSIS — E11.9 TYPE 2 DIABETES MELLITUS WITHOUT COMPLICATIONS: ICD-10-CM

## 2021-07-30 LAB
A1C WITH ESTIMATED AVERAGE GLUCOSE RESULT: 8.1 % — HIGH (ref 4–5.6)
ALBUMIN SERPL ELPH-MCNC: 4.4 G/DL — SIGNIFICANT CHANGE UP (ref 3.3–5)
ALP SERPL-CCNC: 86 U/L — SIGNIFICANT CHANGE UP (ref 40–120)
ALT FLD-CCNC: 13 U/L — SIGNIFICANT CHANGE UP (ref 10–45)
ANION GAP SERPL CALC-SCNC: -1 MMOL/L — LOW (ref 5–17)
ANION GAP SERPL CALC-SCNC: 11 MMOL/L — SIGNIFICANT CHANGE UP (ref 5–17)
ANION GAP SERPL CALC-SCNC: 13 MMOL/L — SIGNIFICANT CHANGE UP (ref 5–17)
ANION GAP SERPL CALC-SCNC: 7 MMOL/L — SIGNIFICANT CHANGE UP (ref 5–17)
AST SERPL-CCNC: 20 U/L — SIGNIFICANT CHANGE UP (ref 10–40)
BILIRUB SERPL-MCNC: 0.3 MG/DL — SIGNIFICANT CHANGE UP (ref 0.2–1.2)
BUN SERPL-MCNC: 17 MG/DL — SIGNIFICANT CHANGE UP (ref 7–23)
BUN SERPL-MCNC: 18 MG/DL — SIGNIFICANT CHANGE UP (ref 7–23)
BUN SERPL-MCNC: 18 MG/DL — SIGNIFICANT CHANGE UP (ref 7–23)
BUN SERPL-MCNC: 19 MG/DL — SIGNIFICANT CHANGE UP (ref 7–23)
CALCIUM SERPL-MCNC: 9.2 MG/DL — SIGNIFICANT CHANGE UP (ref 8.4–10.5)
CHLORIDE SERPL-SCNC: 87 MMOL/L — LOW (ref 96–108)
CHLORIDE SERPL-SCNC: 88 MMOL/L — LOW (ref 96–108)
CHLORIDE SERPL-SCNC: 91 MMOL/L — LOW (ref 96–108)
CHLORIDE SERPL-SCNC: 95 MMOL/L — LOW (ref 96–108)
CO2 SERPL-SCNC: 22 MMOL/L — SIGNIFICANT CHANGE UP (ref 22–31)
CO2 SERPL-SCNC: 22 MMOL/L — SIGNIFICANT CHANGE UP (ref 22–31)
CO2 SERPL-SCNC: 23 MMOL/L — SIGNIFICANT CHANGE UP (ref 22–31)
CO2 SERPL-SCNC: 24 MMOL/L — SIGNIFICANT CHANGE UP (ref 22–31)
CREAT SERPL-MCNC: 1.02 MG/DL — SIGNIFICANT CHANGE UP (ref 0.5–1.3)
CREAT SERPL-MCNC: 1.06 MG/DL — SIGNIFICANT CHANGE UP (ref 0.5–1.3)
CREAT SERPL-MCNC: 1.09 MG/DL — SIGNIFICANT CHANGE UP (ref 0.5–1.3)
CREAT SERPL-MCNC: 1.13 MG/DL — SIGNIFICANT CHANGE UP (ref 0.5–1.3)
ESTIMATED AVERAGE GLUCOSE: 186 MG/DL — HIGH (ref 68–114)
GLUCOSE BLDC GLUCOMTR-MCNC: 173 MG/DL — HIGH (ref 70–99)
GLUCOSE BLDC GLUCOMTR-MCNC: 176 MG/DL — HIGH (ref 70–99)
GLUCOSE BLDC GLUCOMTR-MCNC: 203 MG/DL — HIGH (ref 70–99)
GLUCOSE BLDC GLUCOMTR-MCNC: 238 MG/DL — HIGH (ref 70–99)
GLUCOSE SERPL-MCNC: 166 MG/DL — HIGH (ref 70–99)
GLUCOSE SERPL-MCNC: 169 MG/DL — HIGH (ref 70–99)
GLUCOSE SERPL-MCNC: 184 MG/DL — HIGH (ref 70–99)
GLUCOSE SERPL-MCNC: 222 MG/DL — HIGH (ref 70–99)
HCT VFR BLD CALC: 28.6 % — LOW (ref 39–50)
HGB BLD-MCNC: 10.3 G/DL — LOW (ref 13–17)
MAGNESIUM SERPL-MCNC: 1.7 MG/DL — SIGNIFICANT CHANGE UP (ref 1.6–2.6)
MCHC RBC-ENTMCNC: 29.6 PG — SIGNIFICANT CHANGE UP (ref 27–34)
MCHC RBC-ENTMCNC: 36 GM/DL — SIGNIFICANT CHANGE UP (ref 32–36)
MCV RBC AUTO: 82.2 FL — SIGNIFICANT CHANGE UP (ref 80–100)
NRBC # BLD: 0 /100 WBCS — SIGNIFICANT CHANGE UP (ref 0–0)
OSMOLALITY UR: 273 MOSM/KG — LOW (ref 300–900)
OSMOLALITY UR: 388 MOSM/KG — SIGNIFICANT CHANGE UP (ref 300–900)
OSMOLALITY UR: 449 MOSM/KG — SIGNIFICANT CHANGE UP (ref 300–900)
OSMOLALITY UR: 457 MOSM/KG — SIGNIFICANT CHANGE UP (ref 300–900)
PHOSPHATE SERPL-MCNC: 3.9 MG/DL — SIGNIFICANT CHANGE UP (ref 2.5–4.5)
PLATELET # BLD AUTO: 180 K/UL — SIGNIFICANT CHANGE UP (ref 150–400)
POTASSIUM SERPL-MCNC: 4.6 MMOL/L — SIGNIFICANT CHANGE UP (ref 3.5–5.3)
POTASSIUM SERPL-MCNC: 4.7 MMOL/L — SIGNIFICANT CHANGE UP (ref 3.5–5.3)
POTASSIUM SERPL-MCNC: 4.7 MMOL/L — SIGNIFICANT CHANGE UP (ref 3.5–5.3)
POTASSIUM SERPL-MCNC: 4.9 MMOL/L — SIGNIFICANT CHANGE UP (ref 3.5–5.3)
POTASSIUM SERPL-SCNC: 4.6 MMOL/L — SIGNIFICANT CHANGE UP (ref 3.5–5.3)
POTASSIUM SERPL-SCNC: 4.7 MMOL/L — SIGNIFICANT CHANGE UP (ref 3.5–5.3)
POTASSIUM SERPL-SCNC: 4.7 MMOL/L — SIGNIFICANT CHANGE UP (ref 3.5–5.3)
POTASSIUM SERPL-SCNC: 4.9 MMOL/L — SIGNIFICANT CHANGE UP (ref 3.5–5.3)
PROT SERPL-MCNC: 7.8 G/DL — SIGNIFICANT CHANGE UP (ref 6–8.3)
RBC # BLD: 3.48 M/UL — LOW (ref 4.2–5.8)
RBC # FLD: 12.7 % — SIGNIFICANT CHANGE UP (ref 10.3–14.5)
SODIUM SERPL-SCNC: 116 MMOL/L — CRITICAL LOW (ref 135–145)
SODIUM SERPL-SCNC: 121 MMOL/L — LOW (ref 135–145)
SODIUM SERPL-SCNC: 122 MMOL/L — LOW (ref 135–145)
SODIUM SERPL-SCNC: 123 MMOL/L — LOW (ref 135–145)
SODIUM UR-SCNC: 21 MMOL/L — SIGNIFICANT CHANGE UP
SODIUM UR-SCNC: <20 MMOL/L — SIGNIFICANT CHANGE UP
WBC # BLD: 7.06 K/UL — SIGNIFICANT CHANGE UP (ref 3.8–10.5)
WBC # FLD AUTO: 7.06 K/UL — SIGNIFICANT CHANGE UP (ref 3.8–10.5)

## 2021-07-30 PROCEDURE — 99233 SBSQ HOSP IP/OBS HIGH 50: CPT | Mod: GC

## 2021-07-30 PROCEDURE — 99232 SBSQ HOSP IP/OBS MODERATE 35: CPT

## 2021-07-30 PROCEDURE — 71045 X-RAY EXAM CHEST 1 VIEW: CPT | Mod: 26

## 2021-07-30 RX ORDER — ACETAMINOPHEN 500 MG
650 TABLET ORAL EVERY 6 HOURS
Refills: 0 | Status: DISCONTINUED | OUTPATIENT
Start: 2021-07-30 | End: 2021-08-01

## 2021-07-30 RX ORDER — GABAPENTIN 400 MG/1
1200 CAPSULE ORAL EVERY 8 HOURS
Refills: 0 | Status: DISCONTINUED | OUTPATIENT
Start: 2021-07-30 | End: 2021-08-01

## 2021-07-30 RX ORDER — SODIUM CHLORIDE 9 MG/ML
1000 INJECTION INTRAMUSCULAR; INTRAVENOUS; SUBCUTANEOUS ONCE
Refills: 0 | Status: COMPLETED | OUTPATIENT
Start: 2021-07-30 | End: 2021-07-30

## 2021-07-30 RX ORDER — DULOXETINE HYDROCHLORIDE 30 MG/1
30 CAPSULE, DELAYED RELEASE ORAL DAILY
Refills: 0 | Status: DISCONTINUED | OUTPATIENT
Start: 2021-07-30 | End: 2021-08-01

## 2021-07-30 RX ORDER — GABAPENTIN 400 MG/1
300 CAPSULE ORAL EVERY 8 HOURS
Refills: 0 | Status: DISCONTINUED | OUTPATIENT
Start: 2021-07-30 | End: 2021-07-30

## 2021-07-30 RX ORDER — SODIUM CHLORIDE 5 G/100ML
120 INJECTION, SOLUTION INTRAVENOUS
Refills: 0 | Status: DISCONTINUED | OUTPATIENT
Start: 2021-07-30 | End: 2021-07-30

## 2021-07-30 RX ORDER — SODIUM CHLORIDE 9 MG/ML
1 INJECTION INTRAMUSCULAR; INTRAVENOUS; SUBCUTANEOUS EVERY 8 HOURS
Refills: 0 | Status: DISCONTINUED | OUTPATIENT
Start: 2021-07-30 | End: 2021-08-01

## 2021-07-30 RX ORDER — SODIUM CHLORIDE 9 MG/ML
1000 INJECTION INTRAMUSCULAR; INTRAVENOUS; SUBCUTANEOUS
Refills: 0 | Status: DISCONTINUED | OUTPATIENT
Start: 2021-07-30 | End: 2021-07-30

## 2021-07-30 RX ADMIN — PANTOPRAZOLE SODIUM 40 MILLIGRAM(S): 20 TABLET, DELAYED RELEASE ORAL at 06:23

## 2021-07-30 RX ADMIN — HEPARIN SODIUM 5000 UNIT(S): 5000 INJECTION INTRAVENOUS; SUBCUTANEOUS at 14:56

## 2021-07-30 RX ADMIN — CHLORHEXIDINE GLUCONATE 1 APPLICATION(S): 213 SOLUTION TOPICAL at 06:24

## 2021-07-30 RX ADMIN — HEPARIN SODIUM 5000 UNIT(S): 5000 INJECTION INTRAVENOUS; SUBCUTANEOUS at 22:39

## 2021-07-30 RX ADMIN — DULOXETINE HYDROCHLORIDE 30 MILLIGRAM(S): 30 CAPSULE, DELAYED RELEASE ORAL at 13:13

## 2021-07-30 RX ADMIN — Medication 4: at 17:50

## 2021-07-30 RX ADMIN — SODIUM CHLORIDE 1 GRAM(S): 9 INJECTION INTRAMUSCULAR; INTRAVENOUS; SUBCUTANEOUS at 14:56

## 2021-07-30 RX ADMIN — SODIUM CHLORIDE 1 GRAM(S): 9 INJECTION INTRAMUSCULAR; INTRAVENOUS; SUBCUTANEOUS at 11:57

## 2021-07-30 RX ADMIN — SODIUM CHLORIDE 1000 MILLILITER(S): 9 INJECTION INTRAMUSCULAR; INTRAVENOUS; SUBCUTANEOUS at 14:47

## 2021-07-30 RX ADMIN — SODIUM CHLORIDE 100 MILLILITER(S): 9 INJECTION INTRAMUSCULAR; INTRAVENOUS; SUBCUTANEOUS at 09:36

## 2021-07-30 RX ADMIN — Medication 2: at 07:14

## 2021-07-30 RX ADMIN — AMLODIPINE BESYLATE 10 MILLIGRAM(S): 2.5 TABLET ORAL at 06:23

## 2021-07-30 RX ADMIN — Medication 4: at 12:59

## 2021-07-30 RX ADMIN — ATORVASTATIN CALCIUM 10 MILLIGRAM(S): 80 TABLET, FILM COATED ORAL at 22:39

## 2021-07-30 RX ADMIN — HEPARIN SODIUM 5000 UNIT(S): 5000 INJECTION INTRAVENOUS; SUBCUTANEOUS at 06:23

## 2021-07-30 RX ADMIN — SODIUM CHLORIDE 30 MILLILITER(S): 5 INJECTION, SOLUTION INTRAVENOUS at 02:05

## 2021-07-30 RX ADMIN — SODIUM CHLORIDE 1 GRAM(S): 9 INJECTION INTRAMUSCULAR; INTRAVENOUS; SUBCUTANEOUS at 22:39

## 2021-07-30 RX ADMIN — GABAPENTIN 300 MILLIGRAM(S): 400 CAPSULE ORAL at 13:00

## 2021-07-30 RX ADMIN — Medication 50 MICROGRAM(S): at 06:23

## 2021-07-30 NOTE — PROGRESS NOTE ADULT - PROBLEM SELECTOR PLAN 8
F: intermittent 1L NS boluses for hypoNatremia  E: replete K<4, Mg<2  N: consistent carb w/ evening snack  VTE Prophylaxis: hep subQ  GI: not needed  C: Full Code F: intermittent 1L NS boluses for hypoNatremia  E: replete K<4, Mg<2  N: consistent carb w/ evening snack, 1000cc fluid restriction  VTE Prophylaxis: hep subQ  GI: not needed  C: Full Code

## 2021-07-30 NOTE — PROGRESS NOTE ADULT - SUBJECTIVE AND OBJECTIVE BOX
60 y/o M HTN, DM complicated by neuropathy, EtOH liver cirrhosis, hx IVDU, toxic megacolon c/b pneumatosis s/p colectomy/ileostomy who presents with generalized weakness x 3 days found to have MEG, hyponatremia, hyperkalemia w/ EKG changes and bradycardia. EKG previously sinus now showed junctional wide complex regular w/ peaked t waves.  Admitted to ICU for hyperkalemia with EKG changes. Bradycardia resolved after correction of electrolyte abnormalities. MEG improved. Patient continued to have hyponatremia. Initially corrected too rapidly. Slowed with DDAVP x 2. Sodium began to drop once again when urinary output increased. Given hypertonic saline. Now receiving NS with 1g salt tablets TID. Sodium increasing with a goal of 130. Patient stabilized and transferred to [facility].     SUBJECTIVE/OVERNIGHT EVENTS: No acute overnight events. Pt seen in AM at bedside, resting comfortably in bed, and does not appear to be in any acute distress. When asked, pt denies any recent or active fever, chills, nausea, vomiting, headache, acute sob, chest pain, abdominal pain, genitourinary sx, extremity pain or swelling.    VITAL SIGNS:  Vital Signs Last 24 Hrs  T(C): 36.8 (30 Jul 2021 15:27), Max: 37.3 (30 Jul 2021 10:15)  T(F): 98.2 (30 Jul 2021 15:27), Max: 99.2 (30 Jul 2021 10:15)  HR: 83 (30 Jul 2021 15:27) (67 - 112)  BP: 142/69 (30 Jul 2021 15:27) (119/56 - 169/71)  BP(mean): 90 (30 Jul 2021 14:00) (81 - 112)  RR: 20 (30 Jul 2021 15:27) (13 - 43)  SpO2: 100% (30 Jul 2021 15:27) (96% - 100%)    PHYSICAL EXAM:  GENERAL: Lying in bed. No acute distress.   EYES: EOMI, PERRLA, conjunctiva and sclera clear  NECK: Supple, No JVD  Mouth: Mucous membranes moist   NERVOUS SYSTEM: Oriented to person, place, and situation   CHEST/LUNG: B/L good air entry; No rales, rhonchi, or wheezing  HEART: S1S2 normal, no S3, Regular rate and rhythm; No murmurs  ABDOMEN: Soft, Nontender, Nondistended; Bowel sounds present  : Guajardo in pace draining clear urine   EXTREMITIES:  2+ Peripheral Pulses, No clubbing, cyanosis, or edema  SKIN: No rashes or lesions      MEDICATIONS:  MEDICATIONS  (STANDING):  amLODIPine   Tablet 10 milliGRAM(s) Oral daily  atorvastatin 10 milliGRAM(s) Oral at bedtime  chlorhexidine 2% Cloths 1 Application(s) Topical <User Schedule>  dextrose 40% Gel 15 Gram(s) Oral once  dextrose 5%. 1000 milliLiter(s) (50 mL/Hr) IV Continuous <Continuous>  dextrose 5%. 1000 milliLiter(s) (100 mL/Hr) IV Continuous <Continuous>  dextrose 50% Injectable 25 Gram(s) IV Push once  dextrose 50% Injectable 12.5 Gram(s) IV Push once  dextrose 50% Injectable 25 Gram(s) IV Push once  DULoxetine 30 milliGRAM(s) Oral daily  gabapentin 1200 milliGRAM(s) Oral every 8 hours  glucagon  Injectable 1 milliGRAM(s) IntraMuscular once  heparin   Injectable 5000 Unit(s) SubCutaneous every 8 hours  insulin lispro (ADMELOG) corrective regimen sliding scale   SubCutaneous three times a day before meals  insulin lispro (ADMELOG) corrective regimen sliding scale   SubCutaneous at bedtime  levothyroxine 50 MICROGram(s) Oral daily  pantoprazole    Tablet 40 milliGRAM(s) Oral before breakfast  sodium chloride 1 Gram(s) Oral every 8 hours    MEDICATIONS  (PRN):  acetaminophen   Tablet .. 650 milliGRAM(s) Oral every 6 hours PRN Mild Pain (1 - 3), Moderate Pain (4 - 6)      ALLERGIES:  Allergies    No Known Allergies    Intolerances        LABS:                        10.3   7.06  )-----------( 180      ( 30 Jul 2021 07:10 )             28.6     07-30    123<L>  |  88<L>  |  18  ----------------------------<  222<H>  4.6   |  22  |  1.06    Ca    9.2      30 Jul 2021 13:06  Phos  3.9     07-30  Mg     1.7     07-30    TPro  7.8  /  Alb  4.4  /  TBili  0.3  /  DBili  x   /  AST  20  /  ALT  13  /  AlkPhos  86  07-30        RADIOLOGY & ADDITIONAL TESTS: Reviewed.       60 y/o M HTN, DM complicated by neuropathy, EtOH liver cirrhosis, hx IVDU, toxic megacolon c/b pneumatosis s/p colectomy/ileostomy who presents with generalized weakness x 3 days found to have MEG, hyponatremia, hyperkalemia w/ EKG changes and bradycardia. EKG previously sinus now showed junctional wide complex regular w/ peaked t waves.  Admitted to ICU for hyperkalemia with EKG changes. Bradycardia resolved after correction of electrolyte abnormalities. MEG improved. Patient continued to have hyponatremia. Initially corrected too rapidly. Slowed with DDAVP x 2. Sodium began to drop once again when urinary output increased. Given hypertonic saline. Now receiving NS with 1g salt tablets TID. Sodium increasing with a goal of 130. Patient stabilized and transferred to [facility].     SUBJECTIVE/OVERNIGHT EVENTS: No acute overnight events. Pt seen in AM at bedside, resting comfortably in bed, and does not appear to be in any acute distress. When asked, pt denies any recent or active fever, chills, nausea, vomiting, headache, acute sob, chest pain, abdominal pain, genitourinary sx, extremity pain or swelling.    VITAL SIGNS:  Vital Signs Last 24 Hrs  T(C): 36.8 (30 Jul 2021 15:27), Max: 37.3 (30 Jul 2021 10:15)  T(F): 98.2 (30 Jul 2021 15:27), Max: 99.2 (30 Jul 2021 10:15)  HR: 83 (30 Jul 2021 15:27) (67 - 112)  BP: 142/69 (30 Jul 2021 15:27) (119/56 - 169/71)  BP(mean): 90 (30 Jul 2021 14:00) (81 - 112)  RR: 20 (30 Jul 2021 15:27) (13 - 43)  SpO2: 100% (30 Jul 2021 15:27) (96% - 100%)    PHYSICAL EXAM:  GENERAL: Lying in bed. No acute distress.   EYES: EOMI, PERRLA, conjunctiva and sclera clear  NECK: Supple, No JVD  Mouth: Mucous membranes moist   CHEST/LUNG: B/L good air entry; No rales, rhonchi, or wheezing  HEART: Regular rate and rhythm; +S1, S2; No murmurs  ABDOMEN: Soft, Nontender, Nondistended; Bowel sounds present; +R colostomy bag draining loose yellow-brown stool  : no suprapubic tenderness  EXTREMITIES:  2+ Peripheral Pulses, No clubbing, cyanosis, or edema  SKIN: No rashes or lesions  NERVOUS SYSTEM: A&Ox3 (to person, place, and time), no focal deficits      MEDICATIONS:  MEDICATIONS  (STANDING):  amLODIPine   Tablet 10 milliGRAM(s) Oral daily  atorvastatin 10 milliGRAM(s) Oral at bedtime  chlorhexidine 2% Cloths 1 Application(s) Topical <User Schedule>  dextrose 40% Gel 15 Gram(s) Oral once  dextrose 5%. 1000 milliLiter(s) (50 mL/Hr) IV Continuous <Continuous>  dextrose 5%. 1000 milliLiter(s) (100 mL/Hr) IV Continuous <Continuous>  dextrose 50% Injectable 25 Gram(s) IV Push once  dextrose 50% Injectable 12.5 Gram(s) IV Push once  dextrose 50% Injectable 25 Gram(s) IV Push once  DULoxetine 30 milliGRAM(s) Oral daily  gabapentin 1200 milliGRAM(s) Oral every 8 hours  glucagon  Injectable 1 milliGRAM(s) IntraMuscular once  heparin   Injectable 5000 Unit(s) SubCutaneous every 8 hours  insulin lispro (ADMELOG) corrective regimen sliding scale   SubCutaneous three times a day before meals  insulin lispro (ADMELOG) corrective regimen sliding scale   SubCutaneous at bedtime  levothyroxine 50 MICROGram(s) Oral daily  pantoprazole    Tablet 40 milliGRAM(s) Oral before breakfast  sodium chloride 1 Gram(s) Oral every 8 hours    MEDICATIONS  (PRN):  acetaminophen   Tablet .. 650 milliGRAM(s) Oral every 6 hours PRN Mild Pain (1 - 3), Moderate Pain (4 - 6)      ALLERGIES:  Allergies    No Known Allergies    Intolerances        LABS:                        10.3   7.06  )-----------( 180      ( 30 Jul 2021 07:10 )             28.6     07-30    123<L>  |  88<L>  |  18  ----------------------------<  222<H>  4.6   |  22  |  1.06    Ca    9.2      30 Jul 2021 13:06  Phos  3.9     07-30  Mg     1.7     07-30    TPro  7.8  /  Alb  4.4  /  TBili  0.3  /  DBili  x   /  AST  20  /  ALT  13  /  AlkPhos  86  07-30        RADIOLOGY & ADDITIONAL TESTS: Reviewed.       62 y/o M HTN, DM complicated by neuropathy, EtOH liver cirrhosis, hx IVDU, toxic megacolon c/b pneumatosis s/p colectomy/ileostomy who presents with generalized weakness x 3 days found to have MEG, hyponatremia, hyperkalemia w/ EKG changes and bradycardia. EKG previously sinus now showed junctional wide complex regular w/ peaked t waves.  Admitted to ICU for hyperkalemia with EKG changes. Bradycardia resolved after correction of electrolyte abnormalities. MEG improved. Patient continued to have hyponatremia. Initially corrected too rapidly. Slowed with DDAVP x 2. Sodium began to drop once again when urinary output increased. Given hypertonic saline. Now receiving NS with 1g salt tablets TID. Sodium increasing with a goal of 130. Patient stabilized and transferred to [facility].     SUBJECTIVE/OVERNIGHT EVENTS: No acute overnight events. Pt seen  at bedside, resting comfortably in bed, and does not appear to be in any acute distress. Patient states there was decreased transit time noticed between eating and entry of stool to colostomy bag. He states feeling diaphoretic and weak after eating for past few days. When asked, pt denies any recent or active fever, chills, nausea, vomiting, headache, acute sob, chest pain, abdominal pain, genitourinary sx, extremity pain or swelling.    VITAL SIGNS:  Vital Signs Last 24 Hrs  T(C): 36.8 (30 Jul 2021 15:27), Max: 37.3 (30 Jul 2021 10:15)  T(F): 98.2 (30 Jul 2021 15:27), Max: 99.2 (30 Jul 2021 10:15)  HR: 83 (30 Jul 2021 15:27) (67 - 112)  BP: 142/69 (30 Jul 2021 15:27) (119/56 - 169/71)  BP(mean): 90 (30 Jul 2021 14:00) (81 - 112)  RR: 20 (30 Jul 2021 15:27) (13 - 43)  SpO2: 100% (30 Jul 2021 15:27) (96% - 100%)    PHYSICAL EXAM:  GENERAL: Lying in bed. No acute distress.   EYES: EOMI, PERRLA, conjunctiva and sclera clear  NECK: Supple, No JVD  Mouth: Mucous membranes moist   CHEST/LUNG: B/L good air entry; No rales, rhonchi, or wheezing  HEART: Regular rate and rhythm; +S1, S2; No murmurs  ABDOMEN: Soft, Nontender, Nondistended; Bowel sounds present; +R colostomy bag draining loose yellow-brown stool  : no suprapubic tenderness  EXTREMITIES:  2+ Peripheral Pulses, No clubbing, cyanosis, or edema  SKIN: venus statis hyperpigmentation  NERVOUS SYSTEM: A&Ox3 (to person, place, and time), no focal deficits      MEDICATIONS:  MEDICATIONS  (STANDING):  amLODIPine   Tablet 10 milliGRAM(s) Oral daily  atorvastatin 10 milliGRAM(s) Oral at bedtime  chlorhexidine 2% Cloths 1 Application(s) Topical <User Schedule>  dextrose 40% Gel 15 Gram(s) Oral once  dextrose 5%. 1000 milliLiter(s) (50 mL/Hr) IV Continuous <Continuous>  dextrose 5%. 1000 milliLiter(s) (100 mL/Hr) IV Continuous <Continuous>  dextrose 50% Injectable 25 Gram(s) IV Push once  dextrose 50% Injectable 12.5 Gram(s) IV Push once  dextrose 50% Injectable 25 Gram(s) IV Push once  DULoxetine 30 milliGRAM(s) Oral daily  gabapentin 1200 milliGRAM(s) Oral every 8 hours  glucagon  Injectable 1 milliGRAM(s) IntraMuscular once  heparin   Injectable 5000 Unit(s) SubCutaneous every 8 hours  insulin lispro (ADMELOG) corrective regimen sliding scale   SubCutaneous three times a day before meals  insulin lispro (ADMELOG) corrective regimen sliding scale   SubCutaneous at bedtime  levothyroxine 50 MICROGram(s) Oral daily  pantoprazole    Tablet 40 milliGRAM(s) Oral before breakfast  sodium chloride 1 Gram(s) Oral every 8 hours    MEDICATIONS  (PRN):  acetaminophen   Tablet .. 650 milliGRAM(s) Oral every 6 hours PRN Mild Pain (1 - 3), Moderate Pain (4 - 6)      ALLERGIES:  Allergies    No Known Allergies    Intolerances        LABS:                        10.3   7.06  )-----------( 180      ( 30 Jul 2021 07:10 )             28.6     07-30    123<L>  |  88<L>  |  18  ----------------------------<  222<H>  4.6   |  22  |  1.06    Ca    9.2      30 Jul 2021 13:06  Phos  3.9     07-30  Mg     1.7     07-30    TPro  7.8  /  Alb  4.4  /  TBili  0.3  /  DBili  x   /  AST  20  /  ALT  13  /  AlkPhos  86  07-30        RADIOLOGY & ADDITIONAL TESTS: Reviewed.       ACCEPTING TRANSFER FROM MICU TO [facility]    Hospital Course:  62 y/o M HTN, DM complicated by neuropathy, EtOH liver cirrhosis, hx IVDU, toxic megacolon c/b pneumatosis s/p colectomy/ileostomy who presents with generalized weakness x 3 days found to have MEG, hyponatremia, hyperkalemia w/ EKG changes and bradycardia. EKG previously sinus now showed junctional wide complex regular w/ peaked t waves.  Admitted to ICU for hyperkalemia with EKG changes. Bradycardia resolved after correction of electrolyte abnormalities. MEG improved. Patient continued to have hyponatremia. Initially corrected too rapidly. Slowed with DDAVP x 2. Sodium began to drop once again when urinary output increased. Given hypertonic saline. Now receiving NS with 1g salt tablets TID. Sodium increasing with a goal of 130. Patient stabilized and transferred to [facility].     SUBJECTIVE/OVERNIGHT EVENTS: No acute overnight events. Pt seen  at bedside, resting comfortably in bed, and does not appear to be in any acute distress. Patient states there was decreased transit time noticed between eating and entry of stool to colostomy bag. He states feeling diaphoretic and weak after eating for past few days. When asked, pt denies any recent or active fever, chills, nausea, vomiting, headache, acute sob, chest pain, abdominal pain, genitourinary sx, extremity pain or swelling.    VITAL SIGNS:  Vital Signs Last 24 Hrs  T(C): 36.8 (30 Jul 2021 15:27), Max: 37.3 (30 Jul 2021 10:15)  T(F): 98.2 (30 Jul 2021 15:27), Max: 99.2 (30 Jul 2021 10:15)  HR: 83 (30 Jul 2021 15:27) (67 - 112)  BP: 142/69 (30 Jul 2021 15:27) (119/56 - 169/71)  BP(mean): 90 (30 Jul 2021 14:00) (81 - 112)  RR: 20 (30 Jul 2021 15:27) (13 - 43)  SpO2: 100% (30 Jul 2021 15:27) (96% - 100%)    PHYSICAL EXAM:  GENERAL: Lying in bed. No acute distress.   EYES: EOMI, PERRLA, conjunctiva and sclera clear  NECK: Supple, No JVD  Mouth: Mucous membranes moist   CHEST/LUNG: B/L good air entry; No rales, rhonchi, or wheezing  HEART: Regular rate and rhythm; +S1, S2; No murmurs  ABDOMEN: Soft, Nontender, Nondistended; Bowel sounds present; +R colostomy bag draining loose yellow-brown stool  : no suprapubic tenderness  EXTREMITIES:  2+ Peripheral Pulses, No clubbing, cyanosis, or edema  SKIN: venus statis hyperpigmentation  NERVOUS SYSTEM: A&Ox3 (to person, place, and time), no focal deficits      MEDICATIONS:  MEDICATIONS  (STANDING):  amLODIPine   Tablet 10 milliGRAM(s) Oral daily  atorvastatin 10 milliGRAM(s) Oral at bedtime  chlorhexidine 2% Cloths 1 Application(s) Topical <User Schedule>  dextrose 40% Gel 15 Gram(s) Oral once  dextrose 5%. 1000 milliLiter(s) (50 mL/Hr) IV Continuous <Continuous>  dextrose 5%. 1000 milliLiter(s) (100 mL/Hr) IV Continuous <Continuous>  dextrose 50% Injectable 25 Gram(s) IV Push once  dextrose 50% Injectable 12.5 Gram(s) IV Push once  dextrose 50% Injectable 25 Gram(s) IV Push once  DULoxetine 30 milliGRAM(s) Oral daily  gabapentin 1200 milliGRAM(s) Oral every 8 hours  glucagon  Injectable 1 milliGRAM(s) IntraMuscular once  heparin   Injectable 5000 Unit(s) SubCutaneous every 8 hours  insulin lispro (ADMELOG) corrective regimen sliding scale   SubCutaneous three times a day before meals  insulin lispro (ADMELOG) corrective regimen sliding scale   SubCutaneous at bedtime  levothyroxine 50 MICROGram(s) Oral daily  pantoprazole    Tablet 40 milliGRAM(s) Oral before breakfast  sodium chloride 1 Gram(s) Oral every 8 hours    MEDICATIONS  (PRN):  acetaminophen   Tablet .. 650 milliGRAM(s) Oral every 6 hours PRN Mild Pain (1 - 3), Moderate Pain (4 - 6)      ALLERGIES:  Allergies    No Known Allergies    Intolerances        LABS:                        10.3   7.06  )-----------( 180      ( 30 Jul 2021 07:10 )             28.6     07-30    123<L>  |  88<L>  |  18  ----------------------------<  222<H>  4.6   |  22  |  1.06    Ca    9.2      30 Jul 2021 13:06  Phos  3.9     07-30  Mg     1.7     07-30    TPro  7.8  /  Alb  4.4  /  TBili  0.3  /  DBili  x   /  AST  20  /  ALT  13  /  AlkPhos  86  07-30        RADIOLOGY & ADDITIONAL TESTS: Reviewed.

## 2021-07-30 NOTE — PROGRESS NOTE ADULT - PROBLEM SELECTOR PLAN 2
- current K 4.6; maintain between 4-5.5; if greater than 5.5 Lokelma 10 mg PO on admission 8.3; unclear etiology; TSH WNL; came in with MEG 1.78 unclear whether that would be reasoning  s/p potassium cocktail; EKG showed peaked T waves; renal consult no need for urgent dialysis  - current K 4.6; maintain between 4-5.5  - if greater than 5.5 PRN Lokelma 10 mg PO  - AM cortisol levels on admission 8.3; unclear etiology; TSH WNL; came in with MEG 1.78 unclear whether that would be reasoning  s/p potassium cocktail; EKG showed peaked T waves; renal consult no need for urgent dialysis  - current K 4.6; maintain between 4-5.5  - if greater than 5.5 PRN Lokelma 10 mg PO  - Holding home lisinopril due to hypokalemia  - AM cortisol levels

## 2021-07-30 NOTE — PROGRESS NOTE ADULT - PROBLEM SELECTOR PLAN 4
- Amlodipine 10mg   - Holding home lisinopril due to normal blood pressures - c/w Amlodipine 10mg   - Holding home lisinopril due to hyponatremia - c/w home atorvastatin 10 mg qdaily

## 2021-07-30 NOTE — PROGRESS NOTE ADULT - PROBLEM SELECTOR PLAN 7
F: intermittent 1L NS boluses for hypoNatremia  E: replete K<4, Mg<2  N: consistent carb w/ evening snack  VTE Prophylaxis: hep subQ  GI: not needed  C: Full Code - c/w home gabapentin 1200 mg q8hrs

## 2021-07-30 NOTE — PROGRESS NOTE ADULT - PROBLEM SELECTOR PLAN 1
Unclear etiology although possibly from hypovolemic hyponatremic state  - s/p intermittent 1L NS boluses per nephrology recs  - currently on NaCl 1g tabs TID  - f/u 6pm BMP and Deloris, Uosm  - goal Na 130 by 11pm on 7/30 Unclear etiology although possibly from hypovolemic hyponatremic state (specific gravity > 1.3; Deloris <20)   - s/p intermittent 1L NS boluses per nephrology recs  - currently on NaCl 1g tabs TID  - f/u 6pm BMP and Deloris, Uosm  - goal Na 130 by 11pm on 7/30 Unclear etiology although possibly from hypovolemic hyponatremic state (specific gravity > 1.3; Deloris <20); in MICU s/p 2 doses of 2 mcg DDAVP due to reported high urine output since resolved at 40-50cc/hours   - s/p intermittent 1L NS boluses per nephrology recs  - currently on NaCl 1g tabs TID  - f/u 6pm, 10pm BMP and Deloris, Uosm  - goal Na 130 by 11pm on 7/30 sNa 110 on admission. Unclear etiology although possibly from hypovolemic hyponatremic state (specific gravity > 1.3; Deloris <20); in MICU s/p 2 doses of 2 mcg DDAVP due to reported high urine output since resolved at 40-50cc/hours   - s/p intermittent 1L NS boluses per nephrology recs  - currently on NaCl 1g tabs TID  - f/u 6pm, 10pm BMP and Deloris, Uosm  - goal Na 128 by 11pm on 7/30

## 2021-07-30 NOTE — PROGRESS NOTE ADULT - PROBLEM SELECTOR PLAN 6
F: intermittent 1L NS boluses for hypoNatremia  E: replete K<4, Mg<2  N: consistent carb w/ evening snack  VTE Prophylaxis: hep subQ  GI: not needed  C: Full Code  D: MICU --> RMF - c/w home gabapentin 1200 mg q8hrs history of T2DM for which outpatient records show patient takes Janumet and Levemir    - f/u AM A1c  - mISS for now, may need lantus dosing pending POCT trends

## 2021-07-30 NOTE — PROGRESS NOTE ADULT - PROBLEM SELECTOR PLAN 3
- c/w home atorvastatin 10 mg qdaily patient with colostomy bag s/p colectomy for pneumatosis in setting of toxic megacolon; pt states transit time between eating to output has been increased; currently output looks loose yellow/green  - monitor output  - consider imodium if consistency of output is still high

## 2021-07-30 NOTE — PROGRESS NOTE ADULT - SUBJECTIVE AND OBJECTIVE BOX
Patient is a 61y old  Male who presents with a chief complaint of hyponatremia and hyperkalemia with EKG changes and bradycardia. (30 Jul 2021 11:37)      INTERVAL HPI/OVERNIGHT EVENTS:   No overnight events   Afebrile, hemodynamically stable     ICU Vital Signs Last 24 Hrs  T(C): 37.3 (30 Jul 2021 10:15), Max: 37.3 (30 Jul 2021 10:15)  T(F): 99.2 (30 Jul 2021 10:15), Max: 99.2 (30 Jul 2021 10:15)  HR: 84 (30 Jul 2021 10:00) (67 - 112)  BP: 128/63 (30 Jul 2021 10:00) (119/56 - 169/71)  BP(mean): 89 (30 Jul 2021 10:00) (81 - 113)  ABP: --  ABP(mean): --  RR: 20 (30 Jul 2021 10:00) (13 - 38)  SpO2: 100% (30 Jul 2021 10:00) (98% - 100%)    I&O's Summary    29 Jul 2021 07:01  -  30 Jul 2021 07:00  --------------------------------------------------------  IN: 1090 mL / OUT: 2095 mL / NET: -1005 mL    30 Jul 2021 07:01  -  30 Jul 2021 12:42  --------------------------------------------------------  IN: 200 mL / OUT: 40 mL / NET: 160 mL          LABS:                        10.3   7.06  )-----------( 180      ( 30 Jul 2021 07:10 )             28.6     07-30    121<L>  |  87<L>  |  18  ----------------------------<  166<H>  4.7   |  23  |  1.09    Ca    9.2      30 Jul 2021 07:10  Phos  3.9     07-30  Mg     1.7     07-30    TPro  7.8  /  Alb  4.4  /  TBili  0.3  /  DBili  x   /  AST  20  /  ALT  13  /  AlkPhos  86  07-30      Urinalysis Basic - ( 29 Jul 2021 13:12 )    Color: Yellow / Appearance: Clear / SG: >=1.030 / pH: x  Gluc: x / Ketone: NEGATIVE  / Bili: Small / Urobili: 0.2 E.U./dL   Blood: x / Protein: 100 mg/dL / Nitrite: NEGATIVE   Leuk Esterase: Small / RBC: Many /HPF / WBC < 5 /HPF   Sq Epi: x / Non Sq Epi: x / Bacteria: Present /HPF      CAPILLARY BLOOD GLUCOSE      POCT Blood Glucose.: 173 mg/dL (30 Jul 2021 07:05)  POCT Blood Glucose.: 198 mg/dL (29 Jul 2021 23:12)  POCT Blood Glucose.: 250 mg/dL (29 Jul 2021 16:31)        RADIOLOGY & ADDITIONAL TESTS:    Consultant(s) Notes Reviewed:  [x ] YES  [ ] NO    MEDICATIONS  (STANDING):  amLODIPine   Tablet 10 milliGRAM(s) Oral daily  atorvastatin 10 milliGRAM(s) Oral at bedtime  chlorhexidine 2% Cloths 1 Application(s) Topical <User Schedule>  dextrose 40% Gel 15 Gram(s) Oral once  dextrose 5%. 1000 milliLiter(s) (50 mL/Hr) IV Continuous <Continuous>  dextrose 5%. 1000 milliLiter(s) (100 mL/Hr) IV Continuous <Continuous>  dextrose 50% Injectable 25 Gram(s) IV Push once  dextrose 50% Injectable 12.5 Gram(s) IV Push once  dextrose 50% Injectable 25 Gram(s) IV Push once  DULoxetine 30 milliGRAM(s) Oral daily  gabapentin 300 milliGRAM(s) Oral every 8 hours  glucagon  Injectable 1 milliGRAM(s) IntraMuscular once  heparin   Injectable 5000 Unit(s) SubCutaneous every 8 hours  insulin lispro (ADMELOG) corrective regimen sliding scale   SubCutaneous three times a day before meals  insulin lispro (ADMELOG) corrective regimen sliding scale   SubCutaneous at bedtime  levothyroxine 50 MICROGram(s) Oral daily  pantoprazole    Tablet 40 milliGRAM(s) Oral before breakfast  sodium chloride 1 Gram(s) Oral every 8 hours  sodium chloride 0.9%. 1000 milliLiter(s) (100 mL/Hr) IV Continuous <Continuous>    MEDICATIONS  (PRN):      PHYSICAL EXAM:  GENERAL:   HEAD:  Atraumatic, Normocephalic  EYES: EOMI, PERRLA, conjunctiva and sclera clear  NECK: Supple, No JVD, Normal thyroid, no enlarged nodes  NERVOUS SYSTEM:  Alert & Awake.   CHEST/LUNG: B/L good air entry; No rales, rhonchi, or wheezing  HEART: S1S2 normal, no S3, Regular rate and rhythm; No murmurs  ABDOMEN: Soft, Nontender, Nondistended; Bowel sounds present  EXTREMITIES:  2+ Peripheral Pulses, No clubbing, cyanosis, or edema  LYMPH: No lymphadenopathy noted  SKIN: No rashes or lesions    Care Discussed with Consultants/Other Providers [ x] YES  [ ] NO Patient is a 61y old  Male who presents with a chief complaint of hyponatremia and hyperkalemia with EKG changes and bradycardia. (30 Jul 2021 11:37)    Hospital Course    62 y/o M HTN, DM complicated by neuropathy, EtOH liver cirrhosis, hx IVDU, toxic megacolon c/b pneumatosis s/p colectomy/ileostomy who presents with generalized weakness x 3 days found to have MEG, hyponatremia, hyperkalemia w/ EKG changes and bradycardia. Admitted to ICU for hyperkalemia with EKG changes. Bradycardia resolved after correction of electrolyte abnormalities. MEG improved. Patient continued to have hyponatremia. Initially corrected too rapidly. Slowed with DDAVP x 2. Sodium began to drop once again when urinary output increased. Given hypertonic saline. Now receiving NS with 1g salt tablets TID. Sodium increasing with a goal of 130 for7/30.       INTERVAL HPI/OVERNIGHT EVENTS: Patient had an episode of delirium overnight. R      ICU Vital Signs Last 24 Hrs  T(C): 37.3 (30 Jul 2021 10:15), Max: 37.3 (30 Jul 2021 10:15)  T(F): 99.2 (30 Jul 2021 10:15), Max: 99.2 (30 Jul 2021 10:15)  HR: 84 (30 Jul 2021 10:00) (67 - 112)  BP: 128/63 (30 Jul 2021 10:00) (119/56 - 169/71)  BP(mean): 89 (30 Jul 2021 10:00) (81 - 113)  ABP: --  ABP(mean): --  RR: 20 (30 Jul 2021 10:00) (13 - 38)  SpO2: 100% (30 Jul 2021 10:00) (98% - 100%)    I&O's Summary    29 Jul 2021 07:01  -  30 Jul 2021 07:00  --------------------------------------------------------  IN: 1090 mL / OUT: 2095 mL / NET: -1005 mL    30 Jul 2021 07:01  -  30 Jul 2021 12:42  --------------------------------------------------------  IN: 200 mL / OUT: 40 mL / NET: 160 mL          LABS:                        10.3   7.06  )-----------( 180      ( 30 Jul 2021 07:10 )             28.6     07-30    121<L>  |  87<L>  |  18  ----------------------------<  166<H>  4.7   |  23  |  1.09    Ca    9.2      30 Jul 2021 07:10  Phos  3.9     07-30  Mg     1.7     07-30    TPro  7.8  /  Alb  4.4  /  TBili  0.3  /  DBili  x   /  AST  20  /  ALT  13  /  AlkPhos  86  07-30      Urinalysis Basic - ( 29 Jul 2021 13:12 )    Color: Yellow / Appearance: Clear / SG: >=1.030 / pH: x  Gluc: x / Ketone: NEGATIVE  / Bili: Small / Urobili: 0.2 E.U./dL   Blood: x / Protein: 100 mg/dL / Nitrite: NEGATIVE   Leuk Esterase: Small / RBC: Many /HPF / WBC < 5 /HPF   Sq Epi: x / Non Sq Epi: x / Bacteria: Present /HPF      CAPILLARY BLOOD GLUCOSE      POCT Blood Glucose.: 173 mg/dL (30 Jul 2021 07:05)  POCT Blood Glucose.: 198 mg/dL (29 Jul 2021 23:12)  POCT Blood Glucose.: 250 mg/dL (29 Jul 2021 16:31)        RADIOLOGY & ADDITIONAL TESTS:    Consultant(s) Notes Reviewed:  [x ] YES  [ ] NO    MEDICATIONS  (STANDING):  amLODIPine   Tablet 10 milliGRAM(s) Oral daily  atorvastatin 10 milliGRAM(s) Oral at bedtime  chlorhexidine 2% Cloths 1 Application(s) Topical <User Schedule>  dextrose 40% Gel 15 Gram(s) Oral once  dextrose 5%. 1000 milliLiter(s) (50 mL/Hr) IV Continuous <Continuous>  dextrose 5%. 1000 milliLiter(s) (100 mL/Hr) IV Continuous <Continuous>  dextrose 50% Injectable 25 Gram(s) IV Push once  dextrose 50% Injectable 12.5 Gram(s) IV Push once  dextrose 50% Injectable 25 Gram(s) IV Push once  DULoxetine 30 milliGRAM(s) Oral daily  gabapentin 300 milliGRAM(s) Oral every 8 hours  glucagon  Injectable 1 milliGRAM(s) IntraMuscular once  heparin   Injectable 5000 Unit(s) SubCutaneous every 8 hours  insulin lispro (ADMELOG) corrective regimen sliding scale   SubCutaneous three times a day before meals  insulin lispro (ADMELOG) corrective regimen sliding scale   SubCutaneous at bedtime  levothyroxine 50 MICROGram(s) Oral daily  pantoprazole    Tablet 40 milliGRAM(s) Oral before breakfast  sodium chloride 1 Gram(s) Oral every 8 hours  sodium chloride 0.9%. 1000 milliLiter(s) (100 mL/Hr) IV Continuous <Continuous>    MEDICATIONS  (PRN):      PHYSICAL EXAM:  GENERAL: Lying in bed. No acute distress.   EYES: EOMI, PERRLA, conjunctiva and sclera clear  NECK: Supple, No JVD  Mouth: Mucous membranes moist   NERVOUS SYSTEM: Oriented to person, place, and situation   CHEST/LUNG: B/L good air entry; No rales, rhonchi, or wheezing  HEART: S1S2 normal, no S3, Regular rate and rhythm; No murmurs  ABDOMEN: Soft, Nontender, Nondistended; Bowel sounds present  : Guajardo in pace draining clear urine   EXTREMITIES:  2+ Peripheral Pulses, No clubbing, cyanosis, or edema  SKIN: No rashes or lesions    Care Discussed with Consultants/Other Providers [ x] YES  [ ] NO Patient is a 61y old  Male who presents with a chief complaint of hyponatremia and hyperkalemia with EKG changes and bradycardia. (30 Jul 2021 11:37)    Hospital Course    62 y/o M HTN, DM complicated by neuropathy, EtOH liver cirrhosis, hx IVDU, toxic megacolon c/b pneumatosis s/p colectomy/ileostomy who presents with generalized weakness x 3 days found to have MEG, hyponatremia, hyperkalemia w/ EKG changes and bradycardia. Admitted to ICU for hyperkalemia with EKG changes. Bradycardia resolved after correction of electrolyte abnormalities. MEG improved. Patient continued to have hyponatremia. Initially corrected too rapidly. Slowed with DDAVP x 2. Sodium began to drop once again when urinary output increased. Given hypertonic saline. Now receiving NS with 1g salt tablets TID. Sodium increasing with a goal of 130 for7/30.       INTERVAL HPI/OVERNIGHT EVENTS: Patient had an episode of delirium overnight. Easily reoriented.       ICU Vital Signs Last 24 Hrs  T(C): 37.3 (30 Jul 2021 10:15), Max: 37.3 (30 Jul 2021 10:15)  T(F): 99.2 (30 Jul 2021 10:15), Max: 99.2 (30 Jul 2021 10:15)  HR: 84 (30 Jul 2021 10:00) (67 - 112)  BP: 128/63 (30 Jul 2021 10:00) (119/56 - 169/71)  BP(mean): 89 (30 Jul 2021 10:00) (81 - 113)  ABP: --  ABP(mean): --  RR: 20 (30 Jul 2021 10:00) (13 - 38)  SpO2: 100% (30 Jul 2021 10:00) (98% - 100%)    I&O's Summary    29 Jul 2021 07:01  -  30 Jul 2021 07:00  --------------------------------------------------------  IN: 1090 mL / OUT: 2095 mL / NET: -1005 mL    30 Jul 2021 07:01  -  30 Jul 2021 12:42  --------------------------------------------------------  IN: 200 mL / OUT: 40 mL / NET: 160 mL          LABS:                        10.3   7.06  )-----------( 180      ( 30 Jul 2021 07:10 )             28.6     07-30    121<L>  |  87<L>  |  18  ----------------------------<  166<H>  4.7   |  23  |  1.09    Ca    9.2      30 Jul 2021 07:10  Phos  3.9     07-30  Mg     1.7     07-30    TPro  7.8  /  Alb  4.4  /  TBili  0.3  /  DBili  x   /  AST  20  /  ALT  13  /  AlkPhos  86  07-30      Urinalysis Basic - ( 29 Jul 2021 13:12 )    Color: Yellow / Appearance: Clear / SG: >=1.030 / pH: x  Gluc: x / Ketone: NEGATIVE  / Bili: Small / Urobili: 0.2 E.U./dL   Blood: x / Protein: 100 mg/dL / Nitrite: NEGATIVE   Leuk Esterase: Small / RBC: Many /HPF / WBC < 5 /HPF   Sq Epi: x / Non Sq Epi: x / Bacteria: Present /HPF      CAPILLARY BLOOD GLUCOSE      POCT Blood Glucose.: 173 mg/dL (30 Jul 2021 07:05)  POCT Blood Glucose.: 198 mg/dL (29 Jul 2021 23:12)  POCT Blood Glucose.: 250 mg/dL (29 Jul 2021 16:31)        RADIOLOGY & ADDITIONAL TESTS:    Consultant(s) Notes Reviewed:  [x ] YES  [ ] NO    MEDICATIONS  (STANDING):  amLODIPine   Tablet 10 milliGRAM(s) Oral daily  atorvastatin 10 milliGRAM(s) Oral at bedtime  chlorhexidine 2% Cloths 1 Application(s) Topical <User Schedule>  dextrose 40% Gel 15 Gram(s) Oral once  dextrose 5%. 1000 milliLiter(s) (50 mL/Hr) IV Continuous <Continuous>  dextrose 5%. 1000 milliLiter(s) (100 mL/Hr) IV Continuous <Continuous>  dextrose 50% Injectable 25 Gram(s) IV Push once  dextrose 50% Injectable 12.5 Gram(s) IV Push once  dextrose 50% Injectable 25 Gram(s) IV Push once  DULoxetine 30 milliGRAM(s) Oral daily  gabapentin 300 milliGRAM(s) Oral every 8 hours  glucagon  Injectable 1 milliGRAM(s) IntraMuscular once  heparin   Injectable 5000 Unit(s) SubCutaneous every 8 hours  insulin lispro (ADMELOG) corrective regimen sliding scale   SubCutaneous three times a day before meals  insulin lispro (ADMELOG) corrective regimen sliding scale   SubCutaneous at bedtime  levothyroxine 50 MICROGram(s) Oral daily  pantoprazole    Tablet 40 milliGRAM(s) Oral before breakfast  sodium chloride 1 Gram(s) Oral every 8 hours  sodium chloride 0.9%. 1000 milliLiter(s) (100 mL/Hr) IV Continuous <Continuous>    MEDICATIONS  (PRN):      PHYSICAL EXAM:  GENERAL: Lying in bed. No acute distress.   EYES: EOMI, PERRLA, conjunctiva and sclera clear  NECK: Supple, No JVD  Mouth: Mucous membranes moist   NERVOUS SYSTEM: Oriented to person, place, and situation   CHEST/LUNG: B/L good air entry; No rales, rhonchi, or wheezing  HEART: S1S2 normal, no S3, Regular rate and rhythm; No murmurs  ABDOMEN: Soft, Nontender, Nondistended; Bowel sounds present  : Guajardo in pace draining clear urine   EXTREMITIES:  2+ Peripheral Pulses, No clubbing, cyanosis, or edema  SKIN: No rashes or lesions    Care Discussed with Consultants/Other Providers [ x] YES  [ ] NO

## 2021-07-30 NOTE — PROGRESS NOTE ADULT - ASSESSMENT
CARDIOVASCULAR  #  PULMONARY  #  GASTROINTESTINAL  #  RENAL  #  Neurology  #  INFECTIOUS DISEASE  #  ENDOCRINE  #  HEME  #  FLUIDS/ELECTROLYTES/NUTRITION  -IVF  -Monitor, Replete to K>4 and Mg>2  -Diet  PROPHYLAXIS  -DVT  -GI    DISPO  CODE STATUS 60 y/o M HTN, DM complicated by neuropathy, EtOH liver cirrhosis, hx IVDU, toxic megacolon c/b pneumatosis s/p colectomy/ileostomy who presents with generalized weakness x 3 days found to have MEG, hyponatremia, hyperkalemia w/ EKG changes and bradycardia admitted to ICU for management Continues to be hyponatremic.     NEURO     # Delirium   - Agitated overnight. Easily reoriented.   - Likely due to ICU setting       CARDIOVASCULAR  #    PULMONARY  #  GASTROINTESTINAL  #  RENAL  #  Neurology  #  INFECTIOUS DISEASE  #  ENDOCRINE  #  HEME  #  FLUIDS/ELECTROLYTES/NUTRITION  -IVF  -Monitor, Replete to K>4 and Mg>2  -Diet  PROPHYLAXIS  -DVT  -GI    DISPO  CODE STATUS 60 y/o M HTN, DM complicated by neuropathy, EtOH liver cirrhosis, hx IVDU, toxic megacolon c/b pneumatosis s/p colectomy/ileostomy who presents with generalized weakness x 3 days found to have MEG, hyponatremia, hyperkalemia w/ EKG changes and bradycardia admitted to ICU for management Continues to be hyponatremic.     NEURO     # Delirium   - Agitated overnight. Easily reoriented.   - Likely due to ICU setting       CARDIOVASCULAR  #Hypertension   - Amlodipine 10mg   - Holding home lisinopril due to normal blood pressures     PULMONARY  #LYNN    GASTROINTESTINAL  - Bowel regimen as needed     RENAL    #Hyponatremia  - Urine output 40-50 ml per hour  - Na improving (121)   - 1L NA 100ml/hr   - 1g salt tabs TID     INFECTIOUS DISEASE  #lynn      ENDOCRINE  #DM  - ISS   - Holding home Janumet and Levemir     HEME  #lynn    FLUIDS/ELECTROLYTES/NUTRITION    -Monitor, Replete to K>4 and Mg>2  -Diet: Consistent carbohydrate   PROPHYLAXIS: Heparin   -DVT  -GI    DISPO  CODE STATUS

## 2021-07-30 NOTE — DIETITIAN INITIAL EVALUATION ADULT. - OTHER INFO
60 y/o M HTN, DM complicated by neuropathy, EtOH liver cirrhosis, hx IVDU, toxic megacolon c/b pneumatosis s/p colectomy/ileostomy who presents with generalized weakness x 3 days. ICU consulted for hyponatremia and hyperkalemia with EKG changes and bradycardia.  Bradycardia resolved after correction of electrolyte abnormalities. MEG improved. Patient continued to have hyponatremia. Initially corrected too rapidly. Slowed with DDAVP x 2. Sodium began to drop once again when urinary output increased. Given hypertonic saline. Now receiving NS with 1g salt tablets TID. Sodium increasing with a goal of 130 for 7/30.     Pt visited on [facility], RN present. Pt difficult to obtain meaningful info from, noted Delirium+. Ordered for CONSCHO (evening snack diet)+1000ml FR. Reports consuming rice, fish and veggies at lunch today, ~% of meal. Aware of FR as well. Unable to comment on %PO PTA or diet type noted however admitted from shelter. +Ostomy, unable to comment on OP; 480ml 7/29, 600ml 7/28. Kendell 20. No edema/pressure ulcers. No pain.   Please see below for nutritions recommendations.

## 2021-07-30 NOTE — PROGRESS NOTE ADULT - SUBJECTIVE AND OBJECTIVE BOX
Patient is a 61y Male seen and evaluated at bedside.  patients most recent Na this AM is 121 form 116 last night after receiving additional hypertonic saline- patient states that he is feeling better; eating more but is still feeling weak        Meds:    amLODIPine   Tablet 10 daily  atorvastatin 10 at bedtime  chlorhexidine 2% Cloths 1 <User Schedule>  dextrose 40% Gel 15 once  dextrose 5%. 1000 <Continuous>  dextrose 5%. 1000 <Continuous>  dextrose 50% Injectable 25 once  dextrose 50% Injectable 12.5 once  dextrose 50% Injectable 25 once  DULoxetine 30 daily  gabapentin 300 every 8 hours  glucagon  Injectable 1 once  heparin   Injectable 5000 every 8 hours  insulin lispro (ADMELOG) corrective regimen sliding scale  three times a day before meals  insulin lispro (ADMELOG) corrective regimen sliding scale  at bedtime  levothyroxine 50 daily  pantoprazole    Tablet 40 before breakfast  sodium chloride 1 every 8 hours  sodium chloride 0.9%. 1000 <Continuous>      T(C): , Max: 37.3 (07-30-21 @ 10:15)  T(F): , Max: 99.2 (07-30-21 @ 10:15)  HR: 84 (07-30-21 @ 10:00)  BP: 128/63 (07-30-21 @ 10:00)  BP(mean): 89 (07-30-21 @ 10:00)  RR: 20 (07-30-21 @ 10:00)  SpO2: 100% (07-30-21 @ 10:00)  Wt(kg): --    07-29 @ 07:01  -  07-30 @ 07:00  --------------------------------------------------------  IN: 1090 mL / OUT: 2095 mL / NET: -1005 mL    07-30 @ 07:01  -  07-30 @ 11:38  --------------------------------------------------------  IN: 200 mL / OUT: 40 mL / NET: 160 mL          Review of Systems:  CONSTITUTIONAL: No fever or chills,   RESPIRATORY: No shortness of breath, cough,   CARDIOVASCULAR: No chest pain  GASTROINTESTINAL: No abdominal  pain, No nausea or vomiting,   GENITOURINARY: No dysuria or urinary burning,   MUSCULOSKELETAL: + weakness      PHYSICAL EXAM:  GENERAL: well-developed, well nourished, alert, no acute distress at present  CHEST/LUNG: Clear to auscultation bilaterally no rales, rhonchi or wheezing   HEART: normal S1S2, RRR  ABDOMEN: Soft, Nontender, +BS,   EXTREMITIES: No clubbing, cyanosis, or edema         LABS:                        10.3   7.06  )-----------( 180      ( 30 Jul 2021 07:10 )             28.6     07-30    121<L>  |  87<L>  |  18  ----------------------------<  166<H>  4.7   |  23  |  1.09    Ca    9.2      30 Jul 2021 07:10  Phos  3.9     07-30  Mg     1.7     07-30    TPro  7.8  /  Alb  4.4  /  TBili  0.3  /  DBili  x   /  AST  20  /  ALT  13  /  AlkPhos  86  07-30        Urinalysis Basic - ( 29 Jul 2021 13:12 )    Color: Yellow / Appearance: Clear / SG: >=1.030 / pH: x  Gluc: x / Ketone: NEGATIVE  / Bili: Small / Urobili: 0.2 E.U./dL   Blood: x / Protein: 100 mg/dL / Nitrite: NEGATIVE   Leuk Esterase: Small / RBC: Many /HPF / WBC < 5 /HPF   Sq Epi: x / Non Sq Epi: x / Bacteria: Present /HPF      Sodium, Random Urine: <20 mmol/L (07-30 @ 07:10)  Osmolality, Random Urine: 457 mosm/kg (07-30 @ 07:10)  Sodium, Random Urine: 21 mmol/L (07-30 @ 00:29)  Osmolality, Random Urine: 449 mosm/kg (07-30 @ 00:29)  Sodium, Random Urine: 38 mmol/L (07-29 @ 16:38)  Osmolality, Random Urine: 271 mosm/kg (07-29 @ 16:38)  Osmolality, Random Urine: 492 mosm/kg (07-29 @ 13:12)  Sodium, Random Urine: <20 mmol/L (07-29 @ 13:12)  Sodium, Random Urine: <20 mmol/L (07-29 @ 06:16)  Osmolality, Random Urine: 568 mosm/kg (07-29 @ 06:16)  Sodium, Random Urine: 20 mmol/L (07-28 @ 20:47)  Osmolality, Random Urine: 557 mosm/kg (07-28 @ 20:47)  Potassium, Random Urine: 85 mmol/L (07-28 @ 20:47)  Sodium, Random Urine: <20 mmol/L (07-28 @ 17:04)  Osmolality, Random Urine: 492 mosm/kg (07-28 @ 17:04)  Osmolality, Random Urine: 461 mosm/kg (07-28 @ 14:29)  Sodium, Random Urine: <20 mmol/L (07-28 @ 14:26)  Sodium, Random Urine: <20 mmol/L (07-28 @ 12:03)  Osmolality, Random Urine: 442 mosm/kg (07-28 @ 12:03)        RADIOLOGY & ADDITIONAL STUDIES:

## 2021-07-30 NOTE — PROGRESS NOTE ADULT - PROBLEM SELECTOR PROBLEM 6
Nutrition, metabolism, and development symptoms Neuropathy due to type 2 diabetes mellitus DM (diabetes mellitus)

## 2021-07-30 NOTE — PROGRESS NOTE ADULT - ASSESSMENT
62 y/o M HTN, DM complicated by neuropathy, EtOH liver cirrhosis,toxic megacolon c/b pneumatosis s/p colectomy/ileostomy who presents with generalized weakness x 3 days found to be hyponatremic, hyperkalemic with an elevated Cr    Assessment/Plan:   #non-oliguric MEG   improved Cr is back to baseline this AM  monitor urine output  strict i's and o's  bicarb and lytes now stable  avoid NSAIDS    #hyponatremia, hypovolemic., currently asymptomatic  etiology possibly 2/2 retention which is now resolving v. tea/toast diet though less likely    Na this  with urine Na <20  would give 1L NS bolus   would repeat BMP w/ urine Na Urine Osm @2pm   goal 123-125 today    #hyperkalemia  resolved  patient on ACE-I at home; do not restart ace-i     60 y/o M HTN, DM complicated by neuropathy, EtOH liver cirrhosis,toxic megacolon c/b pneumatosis s/p colectomy/ileostomy who presents with generalized weakness x 3 days found to be hyponatremic, hyperkalemic with an elevated Cr    Assessment/Plan:   #non-oliguric MEG   improved Cr is back to baseline this AM  monitor urine output  strict i's and o's  bicarb and lytes now stable  avoid NSAIDS    #hyponatremia, hypovolemic.,   currently asymptomatic  v. tea/toast diet though less likely    Na this  with urine Na <20  would give 1L NS bolus   would repeat BMP w/ urine Na Urine Osm @2pm   goal 123-125 today    #hyperkalemia  resolved  patient on ACE-I at home; do not restart ace-i

## 2021-07-30 NOTE — DIETITIAN INITIAL EVALUATION ADULT. - PERTINENT LABORATORY DATA
low HH   sodium 123  Glucose 222  POCT 238 173 198  BUNCr WDL   K WDL - elevated o/a   Mg WDL - low prior  Phos WDL

## 2021-07-30 NOTE — PROGRESS NOTE ADULT - PROBLEM SELECTOR PLAN 5
history of T2DM for which outpatient records show patient takes Janumet and Levemir    - f/u AM A1c  - mISS for now, may need lantus dosing history of T2DM for which outpatient records show patient takes Janumet and Levemir    -   - f/u AM A1c  - mISS for now, may need lantus dosing history of T2DM for which outpatient records show patient takes Janumet and Levemir    - f/u AM A1c  - f/u HbA1C   - mISS for now, may need lantus dosing pending POCT trends - c/w Amlodipine 10mg

## 2021-07-30 NOTE — PROGRESS NOTE ADULT - CRITICAL CARE SERVICES PROVIDED
[facility] 79  OPERATIVE REPORT    Name:  Mili Betancourt  MR#:  [id number]  :  1958  ACCOUNT #:  [de-identified]  DATE OF SERVICE:  2019      PREOPERATIVE DIAGNOSES:  Screening and lower abdominal pain. POSTOPERATIVE DIAGNOSIS:  Minimal diverticulosis without complication. PROCEDURE PERFORMED:  Colonoscopy. SURGEON:  Reji Gupta M.D.    Delilah MealsEulogio Muta. ANESTHESIA:  MAC.    COMPLICATIONS:  None. SPECIMENS REMOVED:  None. IMPLANTS:  None. ESTIMATED BLOOD LOSS:  None. INDICATIONS:  The patient is 61years old. She has never had a colonoscopy. She has  had some lower abdominal pain. Regular bowel movements, no blood. DESCRIPTION OF PROCEDURE:  After mechanical bowel prep and after intravenous sedation  with a total of 240 mg of propofol, the Olympus scope was introduced into the rectum  and advanced under direct visualization to the cecum. Ileocecal valve and cecal cap  well visualized. Good bowel prep. Scope was slowly withdrawn. Mucosa inspected. No polyps or neoplasia were seen. She had minimal diverticulosis of the sigmoid  colon without complication. Rectum was normal.  Air was aspirated. The patient  tolerated the procedure well. IMPRESSION:  Diverticulosis, otherwise, normal.  Recommend colonoscopy in 10 years.       Teresa Lai MD      [id number]  D:  2019 12:47  T:  2019 0:20  JOB #:  [id number]  CC:  MD Dr. Marianela Engle
Critical care services provided
Critical care services provided

## 2021-07-31 LAB
ALBUMIN SERPL ELPH-MCNC: 4.2 G/DL — SIGNIFICANT CHANGE UP (ref 3.3–5)
ALP SERPL-CCNC: 78 U/L — SIGNIFICANT CHANGE UP (ref 40–120)
ALT FLD-CCNC: 11 U/L — SIGNIFICANT CHANGE UP (ref 10–45)
ANION GAP SERPL CALC-SCNC: 12 MMOL/L — SIGNIFICANT CHANGE UP (ref 5–17)
ANION GAP SERPL CALC-SCNC: 12 MMOL/L — SIGNIFICANT CHANGE UP (ref 5–17)
ANION GAP SERPL CALC-SCNC: 9 MMOL/L — SIGNIFICANT CHANGE UP (ref 5–17)
AST SERPL-CCNC: 16 U/L — SIGNIFICANT CHANGE UP (ref 10–40)
BASOPHILS # BLD AUTO: 0.03 K/UL — SIGNIFICANT CHANGE UP (ref 0–0.2)
BASOPHILS NFR BLD AUTO: 0.3 % — SIGNIFICANT CHANGE UP (ref 0–2)
BILIRUB SERPL-MCNC: 0.3 MG/DL — SIGNIFICANT CHANGE UP (ref 0.2–1.2)
BLD GP AB SCN SERPL QL: NEGATIVE — SIGNIFICANT CHANGE UP
BUN SERPL-MCNC: 13 MG/DL — SIGNIFICANT CHANGE UP (ref 7–23)
BUN SERPL-MCNC: 14 MG/DL — SIGNIFICANT CHANGE UP (ref 7–23)
BUN SERPL-MCNC: 16 MG/DL — SIGNIFICANT CHANGE UP (ref 7–23)
CALCIUM SERPL-MCNC: 9.2 MG/DL — SIGNIFICANT CHANGE UP (ref 8.4–10.5)
CALCIUM SERPL-MCNC: 9.3 MG/DL — SIGNIFICANT CHANGE UP (ref 8.4–10.5)
CALCIUM SERPL-MCNC: 9.8 MG/DL — SIGNIFICANT CHANGE UP (ref 8.4–10.5)
CHLORIDE SERPL-SCNC: 92 MMOL/L — LOW (ref 96–108)
CO2 SERPL-SCNC: 20 MMOL/L — LOW (ref 22–31)
CO2 SERPL-SCNC: 23 MMOL/L — SIGNIFICANT CHANGE UP (ref 22–31)
CO2 SERPL-SCNC: 25 MMOL/L — SIGNIFICANT CHANGE UP (ref 22–31)
CREAT ?TM UR-MCNC: 51 MG/DL — SIGNIFICANT CHANGE UP
CREAT SERPL-MCNC: 0.87 MG/DL — SIGNIFICANT CHANGE UP (ref 0.5–1.3)
CREAT SERPL-MCNC: 0.92 MG/DL — SIGNIFICANT CHANGE UP (ref 0.5–1.3)
CREAT SERPL-MCNC: 0.93 MG/DL — SIGNIFICANT CHANGE UP (ref 0.5–1.3)
EOSINOPHIL # BLD AUTO: 0.15 K/UL — SIGNIFICANT CHANGE UP (ref 0–0.5)
EOSINOPHIL NFR BLD AUTO: 1.3 % — SIGNIFICANT CHANGE UP (ref 0–6)
GLUCOSE BLDC GLUCOMTR-MCNC: 178 MG/DL — HIGH (ref 70–99)
GLUCOSE BLDC GLUCOMTR-MCNC: 194 MG/DL — HIGH (ref 70–99)
GLUCOSE BLDC GLUCOMTR-MCNC: 215 MG/DL — HIGH (ref 70–99)
GLUCOSE BLDC GLUCOMTR-MCNC: 248 MG/DL — HIGH (ref 70–99)
GLUCOSE SERPL-MCNC: 173 MG/DL — HIGH (ref 70–99)
GLUCOSE SERPL-MCNC: 203 MG/DL — HIGH (ref 70–99)
GLUCOSE SERPL-MCNC: 246 MG/DL — HIGH (ref 70–99)
HCT VFR BLD CALC: 27 % — LOW (ref 39–50)
HGB BLD-MCNC: 9.5 G/DL — LOW (ref 13–17)
IMM GRANULOCYTES NFR BLD AUTO: 0.4 % — SIGNIFICANT CHANGE UP (ref 0–1.5)
LYMPHOCYTES # BLD AUTO: 1.23 K/UL — SIGNIFICANT CHANGE UP (ref 1–3.3)
LYMPHOCYTES # BLD AUTO: 11.1 % — LOW (ref 13–44)
MAGNESIUM SERPL-MCNC: 1.5 MG/DL — LOW (ref 1.6–2.6)
MCHC RBC-ENTMCNC: 30.6 PG — SIGNIFICANT CHANGE UP (ref 27–34)
MCHC RBC-ENTMCNC: 35.2 GM/DL — SIGNIFICANT CHANGE UP (ref 32–36)
MCV RBC AUTO: 87.1 FL — SIGNIFICANT CHANGE UP (ref 80–100)
MONOCYTES # BLD AUTO: 0.87 K/UL — SIGNIFICANT CHANGE UP (ref 0–0.9)
MONOCYTES NFR BLD AUTO: 7.8 % — SIGNIFICANT CHANGE UP (ref 2–14)
NEUTROPHILS # BLD AUTO: 8.79 K/UL — HIGH (ref 1.8–7.4)
NEUTROPHILS NFR BLD AUTO: 79.1 % — HIGH (ref 43–77)
NRBC # BLD: 0 /100 WBCS — SIGNIFICANT CHANGE UP (ref 0–0)
OSMOLALITY UR: 307 MOSM/KG — SIGNIFICANT CHANGE UP (ref 300–900)
OSMOLALITY UR: 463 MOSM/KG — SIGNIFICANT CHANGE UP (ref 300–900)
PHOSPHATE SERPL-MCNC: 2.7 MG/DL — SIGNIFICANT CHANGE UP (ref 2.5–4.5)
PLATELET # BLD AUTO: 181 K/UL — SIGNIFICANT CHANGE UP (ref 150–400)
POTASSIUM SERPL-MCNC: 4.3 MMOL/L — SIGNIFICANT CHANGE UP (ref 3.5–5.3)
POTASSIUM SERPL-MCNC: 4.3 MMOL/L — SIGNIFICANT CHANGE UP (ref 3.5–5.3)
POTASSIUM SERPL-MCNC: 4.6 MMOL/L — SIGNIFICANT CHANGE UP (ref 3.5–5.3)
POTASSIUM SERPL-SCNC: 4.3 MMOL/L — SIGNIFICANT CHANGE UP (ref 3.5–5.3)
POTASSIUM SERPL-SCNC: 4.3 MMOL/L — SIGNIFICANT CHANGE UP (ref 3.5–5.3)
POTASSIUM SERPL-SCNC: 4.6 MMOL/L — SIGNIFICANT CHANGE UP (ref 3.5–5.3)
PROT SERPL-MCNC: 7.5 G/DL — SIGNIFICANT CHANGE UP (ref 6–8.3)
RBC # BLD: 3.1 M/UL — LOW (ref 4.2–5.8)
RBC # FLD: 13.1 % — SIGNIFICANT CHANGE UP (ref 10.3–14.5)
RH IG SCN BLD-IMP: POSITIVE — SIGNIFICANT CHANGE UP
SODIUM SERPL-SCNC: 124 MMOL/L — LOW (ref 135–145)
SODIUM SERPL-SCNC: 124 MMOL/L — LOW (ref 135–145)
SODIUM SERPL-SCNC: 129 MMOL/L — LOW (ref 135–145)
SODIUM UR-SCNC: 24 MMOL/L — SIGNIFICANT CHANGE UP
SODIUM UR-SCNC: 27 MMOL/L — SIGNIFICANT CHANGE UP
URATE SERPL-MCNC: 5.3 MG/DL — SIGNIFICANT CHANGE UP (ref 3.4–8.8)
WBC # BLD: 11.12 K/UL — HIGH (ref 3.8–10.5)
WBC # FLD AUTO: 11.12 K/UL — HIGH (ref 3.8–10.5)

## 2021-07-31 PROCEDURE — 99233 SBSQ HOSP IP/OBS HIGH 50: CPT | Mod: GC

## 2021-07-31 RX ORDER — MAGNESIUM SULFATE 500 MG/ML
2 VIAL (ML) INJECTION ONCE
Refills: 0 | Status: COMPLETED | OUTPATIENT
Start: 2021-07-31 | End: 2021-07-31

## 2021-07-31 RX ADMIN — AMLODIPINE BESYLATE 10 MILLIGRAM(S): 2.5 TABLET ORAL at 07:04

## 2021-07-31 RX ADMIN — Medication 650 MILLIGRAM(S): at 19:51

## 2021-07-31 RX ADMIN — SODIUM CHLORIDE 1 GRAM(S): 9 INJECTION INTRAMUSCULAR; INTRAVENOUS; SUBCUTANEOUS at 07:04

## 2021-07-31 RX ADMIN — DULOXETINE HYDROCHLORIDE 30 MILLIGRAM(S): 30 CAPSULE, DELAYED RELEASE ORAL at 11:47

## 2021-07-31 RX ADMIN — HEPARIN SODIUM 5000 UNIT(S): 5000 INJECTION INTRAVENOUS; SUBCUTANEOUS at 14:23

## 2021-07-31 RX ADMIN — GABAPENTIN 1200 MILLIGRAM(S): 400 CAPSULE ORAL at 08:00

## 2021-07-31 RX ADMIN — GABAPENTIN 1200 MILLIGRAM(S): 400 CAPSULE ORAL at 15:39

## 2021-07-31 RX ADMIN — HEPARIN SODIUM 5000 UNIT(S): 5000 INJECTION INTRAVENOUS; SUBCUTANEOUS at 07:04

## 2021-07-31 RX ADMIN — PANTOPRAZOLE SODIUM 40 MILLIGRAM(S): 20 TABLET, DELAYED RELEASE ORAL at 07:04

## 2021-07-31 RX ADMIN — Medication 2: at 13:22

## 2021-07-31 RX ADMIN — Medication 2: at 08:30

## 2021-07-31 RX ADMIN — SODIUM CHLORIDE 1 GRAM(S): 9 INJECTION INTRAMUSCULAR; INTRAVENOUS; SUBCUTANEOUS at 22:32

## 2021-07-31 RX ADMIN — Medication 50 MICROGRAM(S): at 07:04

## 2021-07-31 RX ADMIN — HEPARIN SODIUM 5000 UNIT(S): 5000 INJECTION INTRAVENOUS; SUBCUTANEOUS at 22:32

## 2021-07-31 RX ADMIN — Medication 4: at 17:46

## 2021-07-31 RX ADMIN — GABAPENTIN 1200 MILLIGRAM(S): 400 CAPSULE ORAL at 00:20

## 2021-07-31 RX ADMIN — ATORVASTATIN CALCIUM 10 MILLIGRAM(S): 80 TABLET, FILM COATED ORAL at 22:32

## 2021-07-31 RX ADMIN — Medication 650 MILLIGRAM(S): at 10:04

## 2021-07-31 RX ADMIN — Medication 50 GRAM(S): at 11:47

## 2021-07-31 RX ADMIN — SODIUM CHLORIDE 1 GRAM(S): 9 INJECTION INTRAMUSCULAR; INTRAVENOUS; SUBCUTANEOUS at 14:23

## 2021-07-31 RX ADMIN — Medication 650 MILLIGRAM(S): at 11:00

## 2021-07-31 RX ADMIN — GABAPENTIN 1200 MILLIGRAM(S): 400 CAPSULE ORAL at 23:51

## 2021-07-31 NOTE — PROGRESS NOTE ADULT - PROBLEM SELECTOR PLAN 6
history of T2DM for which outpatient records show patient takes Janumet and Levemir    - f/u AM A1c  - mISS for now, may need lantus dosing pending POCT trends

## 2021-07-31 NOTE — PHYSICAL THERAPY INITIAL EVALUATION ADULT - PERTINENT HX OF CURRENT PROBLEM, REHAB EVAL
Pt. is a 61 y.o male presenting with progressive weakness with associated difficulty ambulating. Pt. was admitted for hyponatremia and hyperkalemia with associated EKG changes and bradycardia.

## 2021-07-31 NOTE — PHYSICAL THERAPY INITIAL EVALUATION ADULT - ADDITIONAL COMMENTS
Pt. reports using a rollator x past 10 years due to impaired balance in settings of LE neuropathy. Pt. currently resides in shelter with elevator access.

## 2021-07-31 NOTE — PHYSICAL THERAPY INITIAL EVALUATION ADULT - LIGHT TOUCH SENSATION, RLE, REHAB EVAL
absent bottom of the foot and severely diminished to the level of the ankle, gradually improving towards WNL sensation at the above knee level/severe impairment

## 2021-07-31 NOTE — PROGRESS NOTE ADULT - PROBLEM SELECTOR PLAN 1
sNa 110 on admission. Unclear etiology although possibly from hypovolemic hyponatremic state (specific gravity > 1.3; Deloris <20); in MICU s/p 2 doses of 2 mcg DDAVP due to reported high urine output since resolved at 40-50cc/hours   - s/p intermittent 1L NS boluses per nephrology recs  - currently on NaCl 1g tabs TID  - f/u urine osms, na, creatinine, uric acid  - goal Na 128 by 11pm on 7/30

## 2021-07-31 NOTE — PROGRESS NOTE ADULT - ATTENDING COMMENTS
Patient seen and examined with house-staff during bedside rounds.  Resident note read, including vitals, physical findings, laboratory data, and radiological reports.   Revisions included below.  Direct personal management at bed side and extensive interpretation of the data.  Plan was outlined and discussed in details with the housestaff.  Decision making of high complexity  Action taken for acute disease activity to reflect the level of care provided:  - medication reconciliation  - review laboratory data  The patient was started on 3% saline as the sodium decreased.  The patient was instructed to the limit his water intake.  The sodium increased to 121/7 hour period.  Can will change to normal saline and adjust to target scale sodium of 130.  To start salt tablet.  Patient Neurolite hemodynamically stable.  The blood sugars controlled on and continue insulin sliding scale.  Patient was started on the gabapentin for his diabetic polyneuropathy.  The blood pressure is controlled and he is on amlodipine
continued low urine Na and reports high GI output and still could be hypovolemic -- agree with NS trial
NA improved appropriately  ..no sx  suggest try NS as now low ur Na and could be volume depletion -- (or NaCl tablets)
Patient seen and examined with house-staff during bedside rounds.  Resident note read, including vitals, physical findings, laboratory data, and radiological reports.   Revisions included below.  Direct personal management at bed side and extensive interpretation of the data.  Plan was outlined and discussed in details with the housestaff.  Decision making of high complexity  Action taken for acute disease activity to reflect the level of care provided:  - medication reconciliation  - review laboratory dataThe patient is stable.  The sodium decreased.  The patient is on fluid restriction the patient was started on 3%.  Follow-up on sodium.  Reviewed labs.  Patient out of bed in chair.  Patient is hemodynamically stable.
60 y/o M HTN, DM complicated by neuropathy, EtOH liver cirrhosis, hx IVDU, toxic megacolon c/b pneumatosis s/p colectomy/ileostomy who presents with generalized weakness x 3 days found to have MEG, hyponatremia, hyperkalemia admitted for management and treatment. Urine dilute with low Urine Na, he appears euvolemic. Could be from polydipsia unclear. Asking team to resend urine studies including uric acid to help elucidate cause. Working closely with Nephro.

## 2021-07-31 NOTE — PHYSICAL THERAPY INITIAL EVALUATION ADULT - IMPAIRMENTS CONTRIBUTING TO GAIT DEVIATIONS, PT EVAL
+ lateral loss of balance when attempting short distance ambulation w/o assistive device/impaired balance

## 2021-07-31 NOTE — PROGRESS NOTE ADULT - SUBJECTIVE AND OBJECTIVE BOX
Patient is a 61y Male seen and evaluated at bedside doing well this morning. Confused about what fluid restriction means, pt states he thought he could only drink 1.5L of water but other things were ok. Stressed that all liquid intake should be restricted when outpatient has having a low sodium is usually attributable to too much fluid in the system. Pt voiced understanding. Still says he has some weakness, otherwise feels well.       Meds:    acetaminophen   Tablet .. 650 every 6 hours PRN  amLODIPine   Tablet 10 daily  atorvastatin 10 at bedtime  dextrose 40% Gel 15 once  dextrose 5%. 1000 <Continuous>  dextrose 5%. 1000 <Continuous>  dextrose 50% Injectable 25 once  dextrose 50% Injectable 12.5 once  dextrose 50% Injectable 25 once  DULoxetine 30 daily  gabapentin 1200 every 8 hours  glucagon  Injectable 1 once  heparin   Injectable 5000 every 8 hours  insulin lispro (ADMELOG) corrective regimen sliding scale  three times a day before meals  insulin lispro (ADMELOG) corrective regimen sliding scale  at bedtime  levothyroxine 50 daily  pantoprazole    Tablet 40 before breakfast  sodium chloride 1 every 8 hours      T(C): , Max: 37.3 (07-30-21 @ 10:15)  T(F): , Max: 99.2 (07-30-21 @ 10:15)  HR: 71 (07-31-21 @ 06:40)  BP: 155/66 (07-31-21 @ 06:40)  BP(mean): 90 (07-30-21 @ 14:00)  RR: 18 (07-31-21 @ 06:40)  SpO2: 98% (07-31-21 @ 06:40)  Wt(kg): --    07-30 @ 07:01  -  07-31 @ 07:00  --------------------------------------------------------  IN: 1000 mL / OUT: 770 mL / NET: 230 mL        Weight (kg): 73.3 (07-31 @ 07:42)      Review of Systems:  CONSTITUTIONAL: No fever or chills,   RESPIRATORY: No shortness of breath, cough,   CARDIOVASCULAR: No chest pain  GASTROINTESTINAL: No abdominal  pain, No nausea or vomiting,   GENITOURINARY: No dysuria or urinary burning,   MUSCULOSKELETAL: + weakness      PHYSICAL EXAM:  GENERAL: well-developed, well nourished, alert, no acute distress at present  CHEST/LUNG: Clear to auscultation bilaterally no rales, rhonchi or wheezing   HEART: normal S1S2, RRR  ABDOMEN: Soft, Nontender, +BS,   EXTREMITIES: No clubbing, cyanosis, or edema       LABS:                        10.3   7.06  )-----------( 180      ( 30 Jul 2021 07:10 )             28.6     07-31    124<L>  |  92<L>  |  16  ----------------------------<  173<H>  4.6   |  23  |  0.93    Ca    9.3      31 Jul 2021 02:46  Phos  3.9     07-30  Mg     1.7     07-30    TPro  7.8  /  Alb  4.4  /  TBili  0.3  /  DBili  x   /  AST  20  /  ALT  13  /  AlkPhos  86  07-30        Urinalysis Basic - ( 29 Jul 2021 13:12 )    Color: Yellow / Appearance: Clear / SG: >=1.030 / pH: x  Gluc: x / Ketone: NEGATIVE  / Bili: Small / Urobili: 0.2 E.U./dL   Blood: x / Protein: 100 mg/dL / Nitrite: NEGATIVE   Leuk Esterase: Small / RBC: Many /HPF / WBC < 5 /HPF   Sq Epi: x / Non Sq Epi: x / Bacteria: Present /HPF      Sodium, Random Urine: <20 mmol/L (07-30 @ 20:35)  Osmolality, Random Urine: 273 mosm/kg (07-30 @ 20:35)  Sodium, Random Urine: <20 mmol/L (07-30 @ 13:06)  Osmolality, Random Urine: 388 mosm/kg (07-30 @ 13:06)  Sodium, Random Urine: <20 mmol/L (07-30 @ 07:10)  Osmolality, Random Urine: 457 mosm/kg (07-30 @ 07:10)  Sodium, Random Urine: 21 mmol/L (07-30 @ 00:29)  Osmolality, Random Urine: 449 mosm/kg (07-30 @ 00:29)  Sodium, Random Urine: 38 mmol/L (07-29 @ 16:38)  Osmolality, Random Urine: 271 mosm/kg (07-29 @ 16:38)  Osmolality, Random Urine: 492 mosm/kg (07-29 @ 13:12)  Sodium, Random Urine: <20 mmol/L (07-29 @ 13:12)        RADIOLOGY & ADDITIONAL STUDIES:

## 2021-07-31 NOTE — PHYSICAL THERAPY INITIAL EVALUATION ADULT - GENERAL OBSERVATIONS, REHAB EVAL
Pt. was received supine, on room air, +hep-lock, NAD. Pt. was received supine, on room air, +hep-lock, + colostomy bag, NAD.

## 2021-07-31 NOTE — PROGRESS NOTE ADULT - SUBJECTIVE AND OBJECTIVE BOX
**incomplete**     SUBJECTIVE/OVERNIGHT EVENTS: No acute overnight events. Pt seen in AM at bedside, resting comfortably in bed, and does not appear to be in any acute distress. When asked, pt denies any recent or active fever, chills, nausea, vomiting, headache, acute sob, chest pain, abdominal pain, genitourinary sx, extremity pain or swelling.    VITAL SIGNS:  Vital Signs Last 24 Hrs  T(C): 36.8 (31 Jul 2021 06:40), Max: 37.3 (30 Jul 2021 10:15)  T(F): 98.3 (31 Jul 2021 06:40), Max: 99.2 (30 Jul 2021 10:15)  HR: 71 (31 Jul 2021 06:40) (71 - 103)  BP: 155/66 (31 Jul 2021 06:40) (122/58 - 155/66)  BP(mean): 90 (30 Jul 2021 14:00) (84 - 106)  RR: 18 (31 Jul 2021 06:40) (14 - 43)  SpO2: 98% (31 Jul 2021 06:40) (96% - 100%)    PHYSICAL EXAM:  General: NAD; speaking in full sentences  HEENT: NC/AT; PERRL; EOMI; MMM  Neck: supple; no JVD  Cardiac: RRR; +S1/S2  Pulm: CTA B/L; no W/R/R  GI: soft, NT/ND, +BS  Extremities: WWP; no edema, clubbing or cyanosis  Vasc: 2+ radial, DP pulses B/L  Neuro: AAOx3; no focal deficits    MEDICATIONS:  MEDICATIONS  (STANDING):  amLODIPine   Tablet 10 milliGRAM(s) Oral daily  atorvastatin 10 milliGRAM(s) Oral at bedtime  chlorhexidine 2% Cloths 1 Application(s) Topical <User Schedule>  dextrose 40% Gel 15 Gram(s) Oral once  dextrose 5%. 1000 milliLiter(s) (50 mL/Hr) IV Continuous <Continuous>  dextrose 5%. 1000 milliLiter(s) (100 mL/Hr) IV Continuous <Continuous>  dextrose 50% Injectable 25 Gram(s) IV Push once  dextrose 50% Injectable 12.5 Gram(s) IV Push once  dextrose 50% Injectable 25 Gram(s) IV Push once  DULoxetine 30 milliGRAM(s) Oral daily  gabapentin 1200 milliGRAM(s) Oral every 8 hours  glucagon  Injectable 1 milliGRAM(s) IntraMuscular once  heparin   Injectable 5000 Unit(s) SubCutaneous every 8 hours  insulin lispro (ADMELOG) corrective regimen sliding scale   SubCutaneous three times a day before meals  insulin lispro (ADMELOG) corrective regimen sliding scale   SubCutaneous at bedtime  levothyroxine 50 MICROGram(s) Oral daily  pantoprazole    Tablet 40 milliGRAM(s) Oral before breakfast  sodium chloride 1 Gram(s) Oral every 8 hours    MEDICATIONS  (PRN):  acetaminophen   Tablet .. 650 milliGRAM(s) Oral every 6 hours PRN Mild Pain (1 - 3), Moderate Pain (4 - 6)      ALLERGIES:  Allergies    No Known Allergies    Intolerances        LABS:                        10.3   7.06  )-----------( 180      ( 30 Jul 2021 07:10 )             28.6     07-31    124<L>  |  92<L>  |  16  ----------------------------<  173<H>  4.6   |  23  |  0.93    Ca    9.3      31 Jul 2021 02:46  Phos  3.9     07-30  Mg     1.7     07-30    TPro  7.8  /  Alb  4.4  /  TBili  0.3  /  DBili  x   /  AST  20  /  ALT  13  /  AlkPhos  86  07-30        RADIOLOGY & ADDITIONAL TESTS: Reviewed. SUBJECTIVE/OVERNIGHT EVENTS: No acute overnight events. Pt seen in AM at bedside, resting comfortably in bed, and does not appear to be in any acute distress. Patient stated he had pain in his legs pre-dose of gabapentin. When asked, pt denies any recent or active fever, chills, nausea, vomiting, headache, acute sob, chest pain, abdominal pain, genitourinary sx, extremity swelling.    VITAL SIGNS:  Vital Signs Last 24 Hrs  T(F): 98.3 (31 Jul 2021 06:40), Max: 99.2 (30 Jul 2021 10:15)  HR: 71 (31 Jul 2021 06:40) (71 - 103)  BP: 155/66 (31 Jul 2021 06:40) (122/58 - 155/66)  BP(mean): 90 (30 Jul 2021 14:00) (84 - 106)  RR: 18 (31 Jul 2021 06:40) (14 - 43)  SpO2: 98% (31 Jul 2021 06:40) (96% - 100%)    PHYSICAL EXAM:  GENERAL: Lying in bed. No acute distress.   EYES: EOMI, PERRLA, conjunctiva and sclera clear  NECK: Supple, No JVD  Mouth: Mucous membranes moist   CHEST/LUNG: B/L good air entry; No rales, rhonchi, or wheezing  HEART: Regular rate and rhythm; +S1, S2; No murmurs  ABDOMEN: Soft, Nontender, Nondistended; Bowel sounds present; +R colostomy bag draining loose yellow-brown stool  : no suprapubic tenderness  EXTREMITIES:  2+ Peripheral Pulses, No clubbing, cyanosis, or edema  SKIN: venus statis hyperpigmentation  NERVOUS SYSTEM: A&Ox3 (to person, place, and time), no focal deficits      MEDICATIONS:  MEDICATIONS  (STANDING):  amLODIPine   Tablet 10 milliGRAM(s) Oral daily  atorvastatin 10 milliGRAM(s) Oral at bedtime  chlorhexidine 2% Cloths 1 Application(s) Topical <User Schedule>  dextrose 40% Gel 15 Gram(s) Oral once  dextrose 5%. 1000 milliLiter(s) (50 mL/Hr) IV Continuous <Continuous>  dextrose 5%. 1000 milliLiter(s) (100 mL/Hr) IV Continuous <Continuous>  dextrose 50% Injectable 25 Gram(s) IV Push once  dextrose 50% Injectable 12.5 Gram(s) IV Push once  dextrose 50% Injectable 25 Gram(s) IV Push once  DULoxetine 30 milliGRAM(s) Oral daily  gabapentin 1200 milliGRAM(s) Oral every 8 hours  glucagon  Injectable 1 milliGRAM(s) IntraMuscular once  heparin   Injectable 5000 Unit(s) SubCutaneous every 8 hours  insulin lispro (ADMELOG) corrective regimen sliding scale   SubCutaneous three times a day before meals  insulin lispro (ADMELOG) corrective regimen sliding scale   SubCutaneous at bedtime  levothyroxine 50 MICROGram(s) Oral daily  pantoprazole    Tablet 40 milliGRAM(s) Oral before breakfast  sodium chloride 1 Gram(s) Oral every 8 hours    MEDICATIONS  (PRN):  acetaminophen   Tablet .. 650 milliGRAM(s) Oral every 6 hours PRN Mild Pain (1 - 3), Moderate Pain (4 - 6)      ALLERGIES:  Allergies    No Known Allergies    Intolerances        LABS:                        10.3   7.06  )-----------( 180      ( 30 Jul 2021 07:10 )             28.6     07-31    124<L>  |  92<L>  |  16  ----------------------------<  173<H>  4.6   |  23  |  0.93    Ca    9.3      31 Jul 2021 02:46  Phos  3.9     07-30  Mg     1.7     07-30    TPro  7.8  /  Alb  4.4  /  TBili  0.3  /  DBili  x   /  AST  20  /  ALT  13  /  AlkPhos  86  07-30        RADIOLOGY & ADDITIONAL TESTS: Reviewed.

## 2021-07-31 NOTE — PROGRESS NOTE ADULT - ASSESSMENT
60 y/o M HTN, DM complicated by neuropathy, EtOH liver cirrhosis, hx IVDU, toxic megacolon c/b pneumatosis s/p colectomy/ileostomy who presents with generalized weakness x 3 days found to have MEG, hyponatremia, hyperkalemia admitted for management and treatment.

## 2021-07-31 NOTE — PROGRESS NOTE ADULT - PROBLEM SELECTOR PLAN 3
patient with colostomy bag s/p colectomy for pneumatosis in setting of toxic megacolon; pt states transit time between eating to output has been increased; currently output looks loose yellow/green  - monitor output 100 cc   - consider imodium if consistency of output is still high

## 2021-07-31 NOTE — PROGRESS NOTE ADULT - PROBLEM SELECTOR PLAN 8
F: intermittent 1L NS boluses for hypoNatremia  E: replete K<4, Mg<2  N: consistent carb w/ evening snack, 1000cc fluid restriction  VTE Prophylaxis: hep subQ  GI: not needed  C: Full Code

## 2021-07-31 NOTE — PROGRESS NOTE ADULT - ASSESSMENT
60 y/o M HTN, DM complicated by neuropathy, EtOH liver cirrhosis,toxic megacolon c/b pneumatosis s/p colectomy/ileostomy who presents with generalized weakness x 3 days found to be hyponatremic, hyperkalemic with an elevated Cr    Assessment/Plan:   #non-oliguric MEG   -Cr at baseline  -monitor urine output  -strict i's and o's  -bicarb and lytes now stable  avoid NSAIDS    #hyponatremia, hypovolemic.,   currently asymptomatic  v. tea/toast diet though less likely    -Na this   -Please make sure to obtain Urine sodium and Osm with BMP  this will allow for more data to decide on fluid status  -Would get repeat BMP with urine lytes at 1PM.   -Goal of 129 by 1PM today    #hyperkalemia  resolved  patient on ACE-I at home; do not restart ace-i

## 2021-07-31 NOTE — PROGRESS NOTE ADULT - PROBLEM SELECTOR PLAN 2
on admission 8.3; unclear etiology; TSH WNL; came in with MEG 1.78 unclear whether that would be reasoning  s/p potassium cocktail; EKG showed peaked T waves; renal consult no need for urgent dialysis  - current K 4.6; maintain between 4-5.5  - if greater than 5.5 PRN Lokelma 10 mg PO  - holding home lisinopril due to hypokalemia  - AM cortisol levels

## 2021-08-01 ENCOUNTER — TRANSCRIPTION ENCOUNTER (OUTPATIENT)
Age: 61
End: 2021-08-01

## 2021-08-01 VITALS
HEART RATE: 85 BPM | DIASTOLIC BLOOD PRESSURE: 72 MMHG | OXYGEN SATURATION: 100 % | RESPIRATION RATE: 18 BRPM | TEMPERATURE: 98 F | SYSTOLIC BLOOD PRESSURE: 157 MMHG

## 2021-08-01 LAB
ALBUMIN SERPL ELPH-MCNC: 4.1 G/DL — SIGNIFICANT CHANGE UP (ref 3.3–5)
ALP SERPL-CCNC: 81 U/L — SIGNIFICANT CHANGE UP (ref 40–120)
ALT FLD-CCNC: 13 U/L — SIGNIFICANT CHANGE UP (ref 10–45)
ANION GAP SERPL CALC-SCNC: 10 MMOL/L — SIGNIFICANT CHANGE UP (ref 5–17)
AST SERPL-CCNC: 13 U/L — SIGNIFICANT CHANGE UP (ref 10–40)
BILIRUB SERPL-MCNC: 0.2 MG/DL — SIGNIFICANT CHANGE UP (ref 0.2–1.2)
BUN SERPL-MCNC: 15 MG/DL — SIGNIFICANT CHANGE UP (ref 7–23)
CALCIUM SERPL-MCNC: 9.5 MG/DL — SIGNIFICANT CHANGE UP (ref 8.4–10.5)
CHLORIDE SERPL-SCNC: 98 MMOL/L — SIGNIFICANT CHANGE UP (ref 96–108)
CO2 SERPL-SCNC: 24 MMOL/L — SIGNIFICANT CHANGE UP (ref 22–31)
CREAT SERPL-MCNC: 0.89 MG/DL — SIGNIFICANT CHANGE UP (ref 0.5–1.3)
GLUCOSE BLDC GLUCOMTR-MCNC: 221 MG/DL — HIGH (ref 70–99)
GLUCOSE BLDC GLUCOMTR-MCNC: 263 MG/DL — HIGH (ref 70–99)
GLUCOSE SERPL-MCNC: 204 MG/DL — HIGH (ref 70–99)
HCT VFR BLD CALC: 27.5 % — LOW (ref 39–50)
HGB BLD-MCNC: 9.5 G/DL — LOW (ref 13–17)
MAGNESIUM SERPL-MCNC: 1.8 MG/DL — SIGNIFICANT CHANGE UP (ref 1.6–2.6)
MCHC RBC-ENTMCNC: 30 PG — SIGNIFICANT CHANGE UP (ref 27–34)
MCHC RBC-ENTMCNC: 34.5 GM/DL — SIGNIFICANT CHANGE UP (ref 32–36)
MCV RBC AUTO: 86.8 FL — SIGNIFICANT CHANGE UP (ref 80–100)
NRBC # BLD: 0 /100 WBCS — SIGNIFICANT CHANGE UP (ref 0–0)
PHOSPHATE SERPL-MCNC: 3.5 MG/DL — SIGNIFICANT CHANGE UP (ref 2.5–4.5)
PLATELET # BLD AUTO: 179 K/UL — SIGNIFICANT CHANGE UP (ref 150–400)
POTASSIUM SERPL-MCNC: 4.6 MMOL/L — SIGNIFICANT CHANGE UP (ref 3.5–5.3)
POTASSIUM SERPL-SCNC: 4.6 MMOL/L — SIGNIFICANT CHANGE UP (ref 3.5–5.3)
PROT SERPL-MCNC: 7.6 G/DL — SIGNIFICANT CHANGE UP (ref 6–8.3)
RBC # BLD: 3.17 M/UL — LOW (ref 4.2–5.8)
RBC # FLD: 13 % — SIGNIFICANT CHANGE UP (ref 10.3–14.5)
SODIUM SERPL-SCNC: 132 MMOL/L — LOW (ref 135–145)
URATE UR-MCNC: 25.4 MG/DL — SIGNIFICANT CHANGE UP
WBC # BLD: 9.74 K/UL — SIGNIFICANT CHANGE UP (ref 3.8–10.5)
WBC # FLD AUTO: 9.74 K/UL — SIGNIFICANT CHANGE UP (ref 3.8–10.5)

## 2021-08-01 PROCEDURE — 84550 ASSAY OF BLOOD/URIC ACID: CPT

## 2021-08-01 PROCEDURE — 96374 THER/PROPH/DIAG INJ IV PUSH: CPT

## 2021-08-01 PROCEDURE — 81001 URINALYSIS AUTO W/SCOPE: CPT

## 2021-08-01 PROCEDURE — 80307 DRUG TEST PRSMV CHEM ANLYZR: CPT

## 2021-08-01 PROCEDURE — 85025 COMPLETE CBC W/AUTO DIFF WBC: CPT

## 2021-08-01 PROCEDURE — 84443 ASSAY THYROID STIM HORMONE: CPT

## 2021-08-01 PROCEDURE — 96376 TX/PRO/DX INJ SAME DRUG ADON: CPT

## 2021-08-01 PROCEDURE — 84100 ASSAY OF PHOSPHORUS: CPT

## 2021-08-01 PROCEDURE — 84481 FREE ASSAY (FT-3): CPT

## 2021-08-01 PROCEDURE — 71045 X-RAY EXAM CHEST 1 VIEW: CPT

## 2021-08-01 PROCEDURE — 84439 ASSAY OF FREE THYROXINE: CPT

## 2021-08-01 PROCEDURE — 74176 CT ABD & PELVIS W/O CONTRAST: CPT

## 2021-08-01 PROCEDURE — 85730 THROMBOPLASTIN TIME PARTIAL: CPT

## 2021-08-01 PROCEDURE — 87635 SARS-COV-2 COVID-19 AMP PRB: CPT

## 2021-08-01 PROCEDURE — 85379 FIBRIN DEGRADATION QUANT: CPT

## 2021-08-01 PROCEDURE — 83880 ASSAY OF NATRIURETIC PEPTIDE: CPT

## 2021-08-01 PROCEDURE — 80053 COMPREHEN METABOLIC PANEL: CPT

## 2021-08-01 PROCEDURE — 96375 TX/PRO/DX INJ NEW DRUG ADDON: CPT

## 2021-08-01 PROCEDURE — 82533 TOTAL CORTISOL: CPT

## 2021-08-01 PROCEDURE — 81003 URINALYSIS AUTO W/O SCOPE: CPT

## 2021-08-01 PROCEDURE — 86850 RBC ANTIBODY SCREEN: CPT

## 2021-08-01 PROCEDURE — 86900 BLOOD TYPING SEROLOGIC ABO: CPT

## 2021-08-01 PROCEDURE — 82550 ASSAY OF CK (CPK): CPT

## 2021-08-01 PROCEDURE — 82962 GLUCOSE BLOOD TEST: CPT

## 2021-08-01 PROCEDURE — 80048 BASIC METABOLIC PNL TOTAL CA: CPT

## 2021-08-01 PROCEDURE — 84295 ASSAY OF SERUM SODIUM: CPT

## 2021-08-01 PROCEDURE — 82330 ASSAY OF CALCIUM: CPT

## 2021-08-01 PROCEDURE — 82803 BLOOD GASES ANY COMBINATION: CPT

## 2021-08-01 PROCEDURE — 84132 ASSAY OF SERUM POTASSIUM: CPT

## 2021-08-01 PROCEDURE — 99285 EMERGENCY DEPT VISIT HI MDM: CPT | Mod: 25

## 2021-08-01 PROCEDURE — 85610 PROTHROMBIN TIME: CPT

## 2021-08-01 PROCEDURE — 82570 ASSAY OF URINE CREATININE: CPT

## 2021-08-01 PROCEDURE — 97161 PT EVAL LOW COMPLEX 20 MIN: CPT

## 2021-08-01 PROCEDURE — 83935 ASSAY OF URINE OSMOLALITY: CPT

## 2021-08-01 PROCEDURE — 83605 ASSAY OF LACTIC ACID: CPT

## 2021-08-01 PROCEDURE — 84300 ASSAY OF URINE SODIUM: CPT

## 2021-08-01 PROCEDURE — 83735 ASSAY OF MAGNESIUM: CPT

## 2021-08-01 PROCEDURE — 84133 ASSAY OF URINE POTASSIUM: CPT

## 2021-08-01 PROCEDURE — 83036 HEMOGLOBIN GLYCOSYLATED A1C: CPT

## 2021-08-01 PROCEDURE — 86769 SARS-COV-2 COVID-19 ANTIBODY: CPT

## 2021-08-01 PROCEDURE — 87086 URINE CULTURE/COLONY COUNT: CPT

## 2021-08-01 PROCEDURE — 99239 HOSP IP/OBS DSCHRG MGMT >30: CPT | Mod: GC

## 2021-08-01 PROCEDURE — 82553 CREATINE MB FRACTION: CPT

## 2021-08-01 PROCEDURE — 93005 ELECTROCARDIOGRAM TRACING: CPT

## 2021-08-01 PROCEDURE — 85027 COMPLETE CBC AUTOMATED: CPT

## 2021-08-01 PROCEDURE — 84540 ASSAY OF URINE/UREA-N: CPT

## 2021-08-01 PROCEDURE — 84560 ASSAY OF URINE/URIC ACID: CPT

## 2021-08-01 PROCEDURE — 86901 BLOOD TYPING SEROLOGIC RH(D): CPT

## 2021-08-01 PROCEDURE — 36415 COLL VENOUS BLD VENIPUNCTURE: CPT

## 2021-08-01 PROCEDURE — 83930 ASSAY OF BLOOD OSMOLALITY: CPT

## 2021-08-01 PROCEDURE — 87040 BLOOD CULTURE FOR BACTERIA: CPT

## 2021-08-01 PROCEDURE — 70450 CT HEAD/BRAIN W/O DYE: CPT

## 2021-08-01 PROCEDURE — 84484 ASSAY OF TROPONIN QUANT: CPT

## 2021-08-01 RX ORDER — SODIUM CHLORIDE 9 MG/ML
1 INJECTION INTRAMUSCULAR; INTRAVENOUS; SUBCUTANEOUS
Qty: 63 | Refills: 0
Start: 2021-08-01 | End: 2021-08-21

## 2021-08-01 RX ORDER — LISINOPRIL/HYDROCHLOROTHIAZIDE 10-12.5 MG
1 TABLET ORAL
Qty: 0 | Refills: 0 | DISCHARGE

## 2021-08-01 RX ADMIN — Medication 50 MICROGRAM(S): at 06:35

## 2021-08-01 RX ADMIN — Medication 650 MILLIGRAM(S): at 10:54

## 2021-08-01 RX ADMIN — SODIUM CHLORIDE 1 GRAM(S): 9 INJECTION INTRAMUSCULAR; INTRAVENOUS; SUBCUTANEOUS at 06:35

## 2021-08-01 RX ADMIN — AMLODIPINE BESYLATE 10 MILLIGRAM(S): 2.5 TABLET ORAL at 06:35

## 2021-08-01 RX ADMIN — HEPARIN SODIUM 5000 UNIT(S): 5000 INJECTION INTRAVENOUS; SUBCUTANEOUS at 14:00

## 2021-08-01 RX ADMIN — GABAPENTIN 1200 MILLIGRAM(S): 400 CAPSULE ORAL at 07:43

## 2021-08-01 RX ADMIN — PANTOPRAZOLE SODIUM 40 MILLIGRAM(S): 20 TABLET, DELAYED RELEASE ORAL at 06:35

## 2021-08-01 RX ADMIN — Medication 650 MILLIGRAM(S): at 09:54

## 2021-08-01 RX ADMIN — SODIUM CHLORIDE 1 GRAM(S): 9 INJECTION INTRAMUSCULAR; INTRAVENOUS; SUBCUTANEOUS at 14:00

## 2021-08-01 RX ADMIN — Medication 6: at 12:07

## 2021-08-01 RX ADMIN — Medication 4: at 09:12

## 2021-08-01 RX ADMIN — HEPARIN SODIUM 5000 UNIT(S): 5000 INJECTION INTRAVENOUS; SUBCUTANEOUS at 06:34

## 2021-08-01 NOTE — DISCHARGE NOTE PROVIDER - NSDCCPCAREPLAN_GEN_ALL_CORE_FT
PRINCIPAL DISCHARGE DIAGNOSIS  Diagnosis: Hyperkalemia  Assessment and Plan of Treatment: On arrival to [facility] your potassium level was 8.2, which is dangerously high. The EKG of your heart showed abnormalities as a result. You did not report any chest pain or palpitations. You admitted to consuming 8-10 bananas on the day of arrival. We gave you medications to reduce your potassium and the levels returned to 4.6 on the day of discharge. WE RECOMMEND YOU DO NOT TAKE the lisinopril-hydrochlorthiazide combination pill because the lisinopril can also increase potassium levels. We instead sent another prescription of hydrochlorthiazide 25mg daily to [facility]. Please discuss with your primary care doctor if you should continue lisinopril in the future. We also recommend only consuming a maximum of 2 bananas per day.      SECONDARY DISCHARGE DIAGNOSES  Diagnosis: Hyponatremia  Assessment and Plan of Treatment: In addition to abnormal potassium levels, you sodium (salt) was low at 110. We typically want this number in the 130s. Low levels can cause confusion and even death. The cause for the low salt is unclear but could have been from a low volume status. We gave you IV fluids and salt tablets to bring your sodium levels up and they drastically improved on the day of discharge. We sent prescriptions of sodium chloride 1g tablets every 8 hours to [facility]. Please take this medication and do not consume more than 1000 cc (1L) of fluids per day. We also recommend checking your basic metabolic panel (BMP_ at your primary care doctor's office within 1 week of discharge.     PRINCIPAL DISCHARGE DIAGNOSIS  Diagnosis: Hyperkalemia  Assessment and Plan of Treatment: On arrival to [facility] your potassium level was 8.2, which is dangerously high. The EKG of your heart showed abnormalities as a result. You did not report any chest pain or palpitations. You admitted to consuming 8-10 bananas on the day of arrival. We gave you medications to reduce your potassium and the levels returned to 4.6 on the day of discharge. WE RECOMMEND YOU DO NOT TAKE the lisinopril-hydrochlorthiazide combination pill because the lisinopril can also increase potassium levels. We instead sent another prescription of hydrochlorthiazide 25mg daily to [facility]. Please discuss with your primary care doctor if you should continue lisinopril in the future. We also recommend only consuming a maximum of 2 bananas per day.      SECONDARY DISCHARGE DIAGNOSES  Diagnosis: Hyponatremia  Assessment and Plan of Treatment: In addition to abnormal potassium levels, your sodium (salt) was low at 110. We typically want this number in the 130s. Low levels can cause confusion and even death. The cause for the low salt is unclear but could have been from a low volume status. We gave you IV fluids and salt tablets to bring your sodium levels up and they drastically improved on the day of discharge. We sent prescriptions of sodium chloride 1g tablets every 8 hours to [facility]. Please take this medication and do not consume more than 1000 cc (1L) of fluids per day. We also recommend checking your basic metabolic panel (BMP) at your primary care doctor's office within 1 week of discharge.

## 2021-08-01 NOTE — DISCHARGE NOTE NURSING/CASE MANAGEMENT/SOCIAL WORK - NSDCFUADDAPPT_GEN_ALL_CORE_FT
1. Please follow-up with your primary care doctor, Dr. Ovidio Pham, within 1 week of discharge. Bring this hospital discharge note to review your hospitalization for low sodium and high potassium levels. We discontinued your lisinopril, but continued you on hydrochlorthiazide and amlodipine for blood pressure control. The office can be reached at [phone].

## 2021-08-01 NOTE — PROGRESS NOTE ADULT - REASON FOR ADMISSION
hyponatremia and hyperkalemia with EKG changes and bradycardia.

## 2021-08-01 NOTE — DISCHARGE NOTE PROVIDER - CARE PROVIDER_API CALL
[place] [address]  Phone: [phone]  Fax: (   )    -  Established Patient  Follow Up Time: 1 week

## 2021-08-01 NOTE — DISCHARGE NOTE PROVIDER - NSDCFUADDAPPT_GEN_ALL_CORE_FT
1. Please follow-up with your primary care doctor, Dr. Ovidio Pham, within 1 week of discharge. Bring this hospital discharge note to review your hospitalization for low sodium and high potassium levels. We discontinued your lisinopril, but continued you on hydrochlorthiazide and amlodipine for blood pressure control. The office can be reached at [phone].

## 2021-08-01 NOTE — DISCHARGE NOTE NURSING/CASE MANAGEMENT/SOCIAL WORK - PATIENT PORTAL LINK FT
You can access the FollowMyHealth Patient Portal offered by [facility] by registering at the following website: [email or web address]. By joining [facility]’s FollowMyHealth portal, you will also be able to view your health information using other applications (apps) compatible with our system.

## 2021-08-01 NOTE — DISCHARGE NOTE PROVIDER - PROVIDER TOKENS
FREE:[LAST:[Vero],FIRST:[Ovidio],PHONE:[phone]],FAX:[phone]   [phone]    -],ADDRESS:[[place] [address]],FOLLOWUP:[1 week],ESTABLISHEDPATIENT:[T]]

## 2021-08-01 NOTE — DISCHARGE NOTE PROVIDER - HOSPITAL COURSE
#Discharge: do not delete    62 y/o M HTN, DM complicated by neuropathy, EtOH liver cirrhosis, hx IVDU, toxic megacolon c/b pneumatosis s/p colectomy/ileostomy who presents with generalized weakness x 3 days found to have MEG, hyponatremia, hyperkalemia admitted for management and treatment. Electrolyte derangements and MEG now resolved with after insulin/D50 cocktail, fluid restriction 1000cc/day, and salt tabs.     Hospital course (by problem):   62 y/o M HTN, DM complicated by neuropathy, EtOH liver cirrhosis, hx IVDU, toxic megacolon c/b pneumatosis s/p colectomy/ileostomy who presents with generalized weakness x 3 days found to have MEG, hyponatremia, hyperkalemia w/ EKG changes and bradycardia. EKG previously sinus now showed junctional wide complex regular w/ peaked t waves.  Admitted to ICU for hyperkalemia with EKG changes. Bradycardia resolved after correction of electrolyte abnormalities. MEG improved. Patient continued to have hyponatremia. Initially corrected too rapidly. Slowed with DDAVP x 2. Sodium began to drop once again when urinary output increased. Given hypertonic saline. Now receiving intermittent 1L NS boluses with 1g salt tablets TID. Stepped down from MICU to [facility] on 7/30. Sodium levels uptrended to 132 on day of discharge while on salt tabs and 1000cc fluid restriction. Medically optimized for discharge with close PCP follow-up to monitor BMPs as outpatient. Educated to also not consume excess bananas.     #Hyponatremia.    sNa 110 on admission. Unclear etiology although possibly from hypovolemic hyponatremic state (specific gravity > 1.3; Deloris <20); in MICU s/p 2 doses of 2 mcg DDAVP due to reported high urine output since resolved at 40-50cc/hours   - s/p intermittent 1L NS boluses per nephrology recs  - currently on NaCl 1g tabs TID  - sNa improved to 132 on day of discharge (8/1)  - to be sent home on NaCl 1g TID and recommended fluid restriction of 1000cc/day     #Hyperkalemia.    on admission 8.3; unclear etiology although patient admitted to consuming anywhere between 8-10 bananas on day of admission; TSH WNL; came in with MEG 1.78 unclear whether that would be reasoning  s/p potassium cocktail; EKG showed peaked T waves; renal consult no need for urgent dialysis  - current K 4.6; maintain between 4-5.5  - if greater than 5.5 PRN Lokelma 10 mg PO  - holding home lisinopril due to hypokalemia. Recommend discussing with outpatient PCP about resumption of lisinopril given hyperK. May need alternate anti-hypertensive as outpatient  - advised patient to also not consume excess bananas     #Colostomy in place.    patient with colostomy bag s/p colectomy for pneumatosis in setting of toxic megacolon; pt states transit time between eating to output has been increased; currently output looks loose yellow/green  - monitor output 100 cc   - held off on imodium as no increased output    #HLD (hyperlipidemia).    - c/w home atorvastatin 10 mg qdaily.     #HTN (hypertension).    - c/w Amlodipine 10mg.     #DM (diabetes mellitus).  history of T2DM for which outpatient records show patient takes Janumet and Levemir    - A1c 8.1%  - mISS for now    #Neuropathy due to type 2 diabetes mellitus.    - c/w home gabapentin 1200 mg q8hrs.       Patient was discharged to: Shelter  New medications: NaCl 1g TID  Changes to old medications: None  Medications that were stopped: lisinopril (due to hyperkalemia)    Items to follow up as outpatient: BMP to monitor sodium    Physical exam at the time of discharge:  Constitutional: NAD, resting comfortably in bed  Head: NC/AT  Eyes: PERRL, EOMI, anicteric sclera  ENT: no nasal discharge; uvula midline, no oropharyngeal erythema or exudates; MMM  Neck: supple; no JVD   Respiratory: CTA B/L; no W/R/R  Cardiac: +S1/S2; RRR; no M/R/G  Gastrointestinal: soft, NT/ND; no rebound or guarding; +BSx4; +colostomy bag on the Right  Extremities: WWP, no clubbing or cyanosis; no peripheral edema  Musculoskeletal: NROM x4; no joint swelling, tenderness or erythema  Vascular: 2+ radial, femoral, DP/PT pulses B/L  Dermatologic: skin warm, dry and intact; no rashes, wounds, or scars  Neurologic: AAOx3; CNII-XII grossly intact; no focal deficits  Psychiatric: affect and characteristics of appearance, verbalizations, behaviors are appropriate

## 2021-08-01 NOTE — PROGRESS NOTE ADULT - SUBJECTIVE AND OBJECTIVE BOX
Patient is a 61y Male seen and evaluated at bedside doing well this morning has been more strict with his fluid restriction since yesterday. His sodium this morning is 132, up from 129 the night before. No acute complaints voiced this AM.       Meds:    acetaminophen   Tablet .. 650 every 6 hours PRN  amLODIPine   Tablet 10 daily  atorvastatin 10 at bedtime  dextrose 40% Gel 15 once  dextrose 5%. 1000 <Continuous>  dextrose 5%. 1000 <Continuous>  dextrose 50% Injectable 25 once  dextrose 50% Injectable 12.5 once  dextrose 50% Injectable 25 once  DULoxetine 30 daily  gabapentin 1200 every 8 hours  glucagon  Injectable 1 once  heparin   Injectable 5000 every 8 hours  insulin lispro (ADMELOG) corrective regimen sliding scale  three times a day before meals  insulin lispro (ADMELOG) corrective regimen sliding scale  at bedtime  levothyroxine 50 daily  pantoprazole    Tablet 40 before breakfast  sodium chloride 1 every 8 hours      T(C): , Max: 37.1 (07-31-21 @ 10:30)  T(F): , Max: 98.7 (07-31-21 @ 10:30)  HR: 85 (08-01-21 @ 08:50)  BP: 157/72 (08-01-21 @ 08:50)  BP(mean): --  RR: 18 (08-01-21 @ 08:50)  SpO2: 100% (08-01-21 @ 08:50)  Wt(kg): --    07-31 @ 07:01  -  08-01 @ 07:00  --------------------------------------------------------  IN: 300 mL / OUT: 2550 mL / NET: -2250 mL          Review of Systems:  10 point ROS negative except as per HPI      PHYSICAL EXAM:  GENERAL: well-developed, well nourished, alert, no acute distress at present  CHEST/LUNG: Clear to auscultation bilaterally no rales, rhonchi or wheezing   HEART: normal S1S2, RRR  ABDOMEN: Soft, Nontender, +BS,   EXTREMITIES: No clubbing, cyanosis, or edema     LABS:                        9.5    9.74  )-----------( 179      ( 01 Aug 2021 07:47 )             27.5     08-01    132<L>  |  98  |  15  ----------------------------<  204<H>  4.6   |  24  |  0.89    Ca    9.5      01 Aug 2021 07:47  Phos  3.5     08-01  Mg     1.8     08-01    TPro  7.6  /  Alb  4.1  /  TBili  0.2  /  DBili  x   /  AST  13  /  ALT  13  /  AlkPhos  81  08-01    Uric Acid, Serum: 5.3 mg/dL [3.4 - 8.8] (07-31 @ 10:45)        Sodium, Random Urine: 27 mmol/L (07-31 @ 20:12)  Osmolality, Random Urine: 463 mosm/kg (07-31 @ 20:12)  Creatinine, Random Urine: 51 mg/dL (07-31 @ 15:25)  Osmolality, Random Urine: 307 mosm/kg (07-31 @ 15:25)  Sodium, Random Urine: 24 mmol/L (07-31 @ 15:25)  Sodium, Random Urine: <20 mmol/L (07-30 @ 20:35)  Osmolality, Random Urine: 273 mosm/kg (07-30 @ 20:35)  Sodium, Random Urine: <20 mmol/L (07-30 @ 13:06)  Osmolality, Random Urine: 388 mosm/kg (07-30 @ 13:06)        RADIOLOGY & ADDITIONAL STUDIES:

## 2021-08-01 NOTE — DISCHARGE NOTE PROVIDER - NSDCMRMEDTOKEN_GEN_ALL_CORE_FT
amLODIPine 10 mg oral tablet: 1 tab(s) orally once a day  baclofen 10 mg oral tablet: 1 tab(s) orally every 6 hours  DULoxetine 30 mg oral delayed release capsule: 1  orally once a day (at bedtime)  Ferrex 150 Forte oral capsule: 1  orally once a day  ferrous sulfate 325 mg (65 mg elemental iron) oral tablet: 1  orally once a day  gabapentin 300 mg oral capsule: 4 cap(s) orally every 8 hours  glucometer (per patient&#x27;s insurance): 1 application subcutaneous 4 times a day   hydroCHLOROthiazide 25 mg oral tablet: 1 tab(s) orally once a day MDD:1  Janumet 50 mg-500 mg oral tablet: 1 tab(s) orally 2 times a day  levothyroxine 50 mcg (0.05 mg) oral capsule: 1 cap(s) orally once a day  pantoprazole 40 mg oral delayed release tablet: 1 tab(s) orally every 12 hours  pravastatin 40 mg oral tablet: 1 tab(s) orally once a day  sodium chloride 1 g oral tablet: 1 tab(s) orally every 8 hours MDD:3  traZODone 100 mg oral tablet: 100 milligram(s) orally once a day (at bedtime)

## 2021-08-01 NOTE — PROGRESS NOTE ADULT - ASSESSMENT
62 y/o M HTN, DM complicated by neuropathy, EtOH liver cirrhosis,toxic megacolon c/b pneumatosis s/p colectomy/ileostomy who presents with generalized weakness x 3 days found to be hyponatremic, hyperkalemic with an elevated Cr    Assessment/Plan:   #non-oliguric MEG   -Cr at baseline  -monitor urine output  -strict i's and o's  -bicarb and lytes now stable  avoid NSAIDS    #hyponatremia, hypovolemic.,   currently asymptomatic  v. tea/toast diet though less likely    -Na this   -Pt doing well to be DC home today  -Patient advised to continue limiting fluids at home.     #hyperkalemia  resolved  patient on ACE-I at home; do not restart ace-i

## 2021-08-02 LAB
CULTURE RESULTS: SIGNIFICANT CHANGE UP
CULTURE RESULTS: SIGNIFICANT CHANGE UP
SPECIMEN SOURCE: SIGNIFICANT CHANGE UP
SPECIMEN SOURCE: SIGNIFICANT CHANGE UP

## 2021-08-04 LAB
CORTICOSTEROID BINDING GLOBULIN RESULT: 2 MG/DL — SIGNIFICANT CHANGE UP
CORTIS F/TOTAL MFR SERPL: 19 % — SIGNIFICANT CHANGE UP
CORTIS SERPL-MCNC: 15 UG/DL — SIGNIFICANT CHANGE UP
CORTISOL, FREE RESULT: 2.8 UG/DL — HIGH

## 2021-08-05 DIAGNOSIS — Z79.4 LONG TERM (CURRENT) USE OF INSULIN: ICD-10-CM

## 2021-08-05 DIAGNOSIS — R00.1 BRADYCARDIA, UNSPECIFIED: ICD-10-CM

## 2021-08-05 DIAGNOSIS — N17.9 ACUTE KIDNEY FAILURE, UNSPECIFIED: ICD-10-CM

## 2021-08-05 DIAGNOSIS — E11.40 TYPE 2 DIABETES MELLITUS WITH DIABETIC NEUROPATHY, UNSPECIFIED: ICD-10-CM

## 2021-08-05 DIAGNOSIS — D64.9 ANEMIA, UNSPECIFIED: ICD-10-CM

## 2021-08-05 DIAGNOSIS — E86.1 HYPOVOLEMIA: ICD-10-CM

## 2021-08-05 DIAGNOSIS — K70.30 ALCOHOLIC CIRRHOSIS OF LIVER WITHOUT ASCITES: ICD-10-CM

## 2021-08-05 DIAGNOSIS — R33.9 RETENTION OF URINE, UNSPECIFIED: ICD-10-CM

## 2021-08-05 DIAGNOSIS — E87.5 HYPERKALEMIA: ICD-10-CM

## 2021-08-05 DIAGNOSIS — Z93.2 ILEOSTOMY STATUS: ICD-10-CM

## 2021-08-05 DIAGNOSIS — Z20.822 CONTACT WITH AND (SUSPECTED) EXPOSURE TO COVID-19: ICD-10-CM

## 2021-08-05 DIAGNOSIS — Z90.49 ACQUIRED ABSENCE OF OTHER SPECIFIED PARTS OF DIGESTIVE TRACT: ICD-10-CM

## 2021-08-05 DIAGNOSIS — E87.1 HYPO-OSMOLALITY AND HYPONATREMIA: ICD-10-CM

## 2021-08-17 NOTE — H&P ADULT - ATTENDING COMMENTS
Cheeks Filler Volume In Cc: 1.5 This patient was evaluated as an ICU consultation, please refer to that note for the details, billing done on that note.

## 2021-10-10 ENCOUNTER — EMERGENCY (EMERGENCY)
Facility: HOSPITAL | Age: 61
LOS: 1 days | Discharge: LEFT BEFORE DISCHARGE | End: 2021-10-10
Attending: EMERGENCY MEDICINE | Admitting: EMERGENCY MEDICINE
Payer: MEDICAID

## 2021-10-10 VITALS
OXYGEN SATURATION: 100 % | SYSTOLIC BLOOD PRESSURE: 155 MMHG | TEMPERATURE: 97 F | DIASTOLIC BLOOD PRESSURE: 66 MMHG | HEART RATE: 55 BPM | RESPIRATION RATE: 16 BRPM

## 2021-10-10 VITALS
HEART RATE: 80 BPM | DIASTOLIC BLOOD PRESSURE: 77 MMHG | SYSTOLIC BLOOD PRESSURE: 166 MMHG | TEMPERATURE: 98 F | OXYGEN SATURATION: 99 % | WEIGHT: 160.06 LBS | HEIGHT: 66 IN | RESPIRATION RATE: 18 BRPM

## 2021-10-10 DIAGNOSIS — I10 ESSENTIAL (PRIMARY) HYPERTENSION: ICD-10-CM

## 2021-10-10 DIAGNOSIS — E11.9 TYPE 2 DIABETES MELLITUS WITHOUT COMPLICATIONS: ICD-10-CM

## 2021-10-10 DIAGNOSIS — E78.5 HYPERLIPIDEMIA, UNSPECIFIED: ICD-10-CM

## 2021-10-10 DIAGNOSIS — E87.5 HYPERKALEMIA: ICD-10-CM

## 2021-10-10 DIAGNOSIS — R42 DIZZINESS AND GIDDINESS: ICD-10-CM

## 2021-10-10 DIAGNOSIS — Z86.2 PERSONAL HISTORY OF DISEASES OF THE BLOOD AND BLOOD-FORMING ORGANS AND CERTAIN DISORDERS INVOLVING THE IMMUNE MECHANISM: ICD-10-CM

## 2021-10-10 DIAGNOSIS — R06.00 DYSPNEA, UNSPECIFIED: ICD-10-CM

## 2021-10-10 DIAGNOSIS — Z98.890 OTHER SPECIFIED POSTPROCEDURAL STATES: Chronic | ICD-10-CM

## 2021-10-10 DIAGNOSIS — R11.10 VOMITING, UNSPECIFIED: ICD-10-CM

## 2021-10-10 DIAGNOSIS — R05.8 OTHER SPECIFIED COUGH: ICD-10-CM

## 2021-10-10 DIAGNOSIS — E87.1 HYPO-OSMOLALITY AND HYPONATREMIA: ICD-10-CM

## 2021-10-10 DIAGNOSIS — Z20.822 CONTACT WITH AND (SUSPECTED) EXPOSURE TO COVID-19: ICD-10-CM

## 2021-10-10 DIAGNOSIS — Z53.29 PROCEDURE AND TREATMENT NOT CARRIED OUT BECAUSE OF PATIENT'S DECISION FOR OTHER REASONS: ICD-10-CM

## 2021-10-10 DIAGNOSIS — E86.0 DEHYDRATION: ICD-10-CM

## 2021-10-10 DIAGNOSIS — R07.9 CHEST PAIN, UNSPECIFIED: ICD-10-CM

## 2021-10-10 DIAGNOSIS — R06.02 SHORTNESS OF BREATH: ICD-10-CM

## 2021-10-10 DIAGNOSIS — Z90.49 ACQUIRED ABSENCE OF OTHER SPECIFIED PARTS OF DIGESTIVE TRACT: Chronic | ICD-10-CM

## 2021-10-10 DIAGNOSIS — Z90.49 ACQUIRED ABSENCE OF OTHER SPECIFIED PARTS OF DIGESTIVE TRACT: ICD-10-CM

## 2021-10-10 DIAGNOSIS — N17.9 ACUTE KIDNEY FAILURE, UNSPECIFIED: ICD-10-CM

## 2021-10-10 DIAGNOSIS — Z93.2 ILEOSTOMY STATUS: ICD-10-CM

## 2021-10-10 LAB
ALBUMIN SERPL ELPH-MCNC: 2.7 G/DL — LOW (ref 3.4–5)
ALBUMIN SERPL ELPH-MCNC: 2.9 G/DL — LOW (ref 3.4–5)
ALP SERPL-CCNC: 138 U/L — HIGH (ref 40–120)
ALP SERPL-CCNC: 147 U/L — HIGH (ref 40–120)
ALT FLD-CCNC: 34 U/L — SIGNIFICANT CHANGE UP (ref 12–42)
ALT FLD-CCNC: 36 U/L — SIGNIFICANT CHANGE UP (ref 12–42)
ANION GAP SERPL CALC-SCNC: 13 MMOL/L — SIGNIFICANT CHANGE UP (ref 9–16)
ANION GAP SERPL CALC-SCNC: 51 MMOL/L — HIGH (ref 9–16)
APPEARANCE UR: CLEAR — SIGNIFICANT CHANGE UP
AST SERPL-CCNC: 19 U/L — SIGNIFICANT CHANGE UP (ref 15–37)
AST SERPL-CCNC: 20 U/L — SIGNIFICANT CHANGE UP (ref 15–37)
BACTERIA # UR AUTO: ABNORMAL /HPF
BASOPHILS # BLD AUTO: 0.13 K/UL — SIGNIFICANT CHANGE UP (ref 0–0.2)
BASOPHILS NFR BLD AUTO: 1 % — SIGNIFICANT CHANGE UP (ref 0–2)
BILIRUB SERPL-MCNC: 0.1 MG/DL — LOW (ref 0.2–1.2)
BILIRUB SERPL-MCNC: 0.2 MG/DL — SIGNIFICANT CHANGE UP (ref 0.2–1.2)
BILIRUB UR-MCNC: NEGATIVE — SIGNIFICANT CHANGE UP
BUN SERPL-MCNC: 30 MG/DL — HIGH (ref 7–23)
BUN SERPL-MCNC: 31 MG/DL — HIGH (ref 7–23)
CALCIUM SERPL-MCNC: 9.1 MG/DL — SIGNIFICANT CHANGE UP (ref 8.5–10.5)
CALCIUM SERPL-MCNC: 9.6 MG/DL — SIGNIFICANT CHANGE UP (ref 8.5–10.5)
CHLORIDE SERPL-SCNC: 55 MMOL/L — LOW (ref 96–108)
CHLORIDE SERPL-SCNC: 95 MMOL/L — LOW (ref 96–108)
CK MB BLD-MCNC: <0.56 % — SIGNIFICANT CHANGE UP
CK MB CFR SERPL CALC: <0.5 NG/ML — LOW (ref 0.5–3.6)
CK SERPL-CCNC: 90 U/L — SIGNIFICANT CHANGE UP (ref 39–308)
CO2 SERPL-SCNC: 16 MMOL/L — LOW (ref 22–31)
CO2 SERPL-SCNC: 18 MMOL/L — LOW (ref 22–31)
COLOR SPEC: YELLOW — SIGNIFICANT CHANGE UP
CREAT SERPL-MCNC: 1.74 MG/DL — HIGH (ref 0.5–1.3)
CREAT SERPL-MCNC: 1.84 MG/DL — HIGH (ref 0.5–1.3)
DIFF PNL FLD: ABNORMAL
EOSINOPHIL # BLD AUTO: 0 K/UL — SIGNIFICANT CHANGE UP (ref 0–0.5)
EOSINOPHIL NFR BLD AUTO: 0 % — SIGNIFICANT CHANGE UP (ref 0–6)
GLUCOSE SERPL-MCNC: 186 MG/DL — HIGH (ref 70–99)
GLUCOSE SERPL-MCNC: 192 MG/DL — HIGH (ref 70–99)
GLUCOSE UR QL: NEGATIVE — SIGNIFICANT CHANGE UP
HCT VFR BLD CALC: 24 % — LOW (ref 39–50)
HGB BLD-MCNC: 7.6 G/DL — LOW (ref 13–17)
HYPOCHROMIA BLD QL: SLIGHT — SIGNIFICANT CHANGE UP
KETONES UR-MCNC: NEGATIVE — SIGNIFICANT CHANGE UP
LACTATE SERPL-SCNC: 1.5 MMOL/L — SIGNIFICANT CHANGE UP (ref 0.4–2)
LEUKOCYTE ESTERASE UR-ACNC: ABNORMAL
LG PLATELETS BLD QL AUTO: SLIGHT — SIGNIFICANT CHANGE UP
LIDOCAIN IGE QN: 238 U/L — SIGNIFICANT CHANGE UP (ref 73–393)
LYMPHOCYTES # BLD AUTO: 0.94 K/UL — LOW (ref 1–3.3)
LYMPHOCYTES # BLD AUTO: 7 % — LOW (ref 13–44)
MACROCYTES BLD QL: SLIGHT — SIGNIFICANT CHANGE UP
MAGNESIUM SERPL-MCNC: 2 MG/DL — SIGNIFICANT CHANGE UP (ref 1.6–2.6)
MANUAL SMEAR VERIFICATION: SIGNIFICANT CHANGE UP
MCHC RBC-ENTMCNC: 26.6 PG — LOW (ref 27–34)
MCHC RBC-ENTMCNC: 31.7 GM/DL — LOW (ref 32–36)
MCV RBC AUTO: 83.9 FL — SIGNIFICANT CHANGE UP (ref 80–100)
METAMYELOCYTES # FLD: 1 % — HIGH (ref 0–0)
MICROCYTES BLD QL: SLIGHT — SIGNIFICANT CHANGE UP
MONOCYTES # BLD AUTO: 0.67 K/UL — SIGNIFICANT CHANGE UP (ref 0–0.9)
MONOCYTES NFR BLD AUTO: 5 % — SIGNIFICANT CHANGE UP (ref 2–14)
MYELOCYTES NFR BLD: 1 % — HIGH (ref 0–0)
NEUTROPHILS # BLD AUTO: 11.36 K/UL — HIGH (ref 1.8–7.4)
NEUTROPHILS NFR BLD AUTO: 83 % — HIGH (ref 43–77)
NEUTS BAND # BLD: 2 % — SIGNIFICANT CHANGE UP (ref 0–8)
NITRITE UR-MCNC: NEGATIVE — SIGNIFICANT CHANGE UP
NRBC # BLD: 0 /100 — SIGNIFICANT CHANGE UP (ref 0–0)
NRBC # BLD: SIGNIFICANT CHANGE UP /100 WBCS (ref 0–0)
NT-PROBNP SERPL-SCNC: <5 PG/ML — SIGNIFICANT CHANGE UP
PCO2 BLDV: 44 MMHG — SIGNIFICANT CHANGE UP (ref 41–51)
PH BLDV: 7.26 — LOW (ref 7.32–7.43)
PH UR: 5.5 — SIGNIFICANT CHANGE UP (ref 5–8)
PLAT MORPH BLD: NORMAL — SIGNIFICANT CHANGE UP
PLATELET # BLD AUTO: 374 K/UL — SIGNIFICANT CHANGE UP (ref 150–400)
PLATELET COUNT - ESTIMATE: NORMAL — SIGNIFICANT CHANGE UP
PO2 BLDV: 15 MMHG — LOW (ref 35–40)
POTASSIUM SERPL-MCNC: 3.8 MMOL/L — SIGNIFICANT CHANGE UP (ref 3.5–5.3)
POTASSIUM SERPL-MCNC: 6.1 MMOL/L — HIGH (ref 3.5–5.3)
POTASSIUM SERPL-SCNC: 3.8 MMOL/L — SIGNIFICANT CHANGE UP (ref 3.5–5.3)
POTASSIUM SERPL-SCNC: 6.1 MMOL/L — HIGH (ref 3.5–5.3)
PROT SERPL-MCNC: 8.5 G/DL — HIGH (ref 6.4–8.2)
PROT SERPL-MCNC: 9.4 G/DL — HIGH (ref 6.4–8.2)
PROT UR-MCNC: 30 MG/DL
RBC # BLD: 2.86 M/UL — LOW (ref 4.2–5.8)
RBC # FLD: 15.8 % — HIGH (ref 10.3–14.5)
RBC BLD AUTO: ABNORMAL
RBC CASTS # UR COMP ASSIST: < 5 /HPF — SIGNIFICANT CHANGE UP
SAO2 % BLDV: 17 % — SIGNIFICANT CHANGE UP
SARS-COV-2 RNA SPEC QL NAA+PROBE: SIGNIFICANT CHANGE UP
SODIUM SERPL-SCNC: 124 MMOL/L — LOW (ref 132–145)
SODIUM SERPL-SCNC: 124 MMOL/L — LOW (ref 132–145)
SP GR SPEC: 1.02 — SIGNIFICANT CHANGE UP (ref 1–1.03)
TROPONIN I SERPL-MCNC: 0.14 NG/ML — HIGH (ref 0.02–0.06)
UROBILINOGEN FLD QL: 0.2 E.U./DL — SIGNIFICANT CHANGE UP
WBC # BLD: 13.36 K/UL — HIGH (ref 3.8–10.5)
WBC # FLD AUTO: 13.36 K/UL — HIGH (ref 3.8–10.5)
WBC UR QL: ABNORMAL /HPF

## 2021-10-10 PROCEDURE — 71045 X-RAY EXAM CHEST 1 VIEW: CPT | Mod: 26

## 2021-10-10 PROCEDURE — 99285 EMERGENCY DEPT VISIT HI MDM: CPT | Mod: 25

## 2021-10-10 PROCEDURE — 93010 ELECTROCARDIOGRAM REPORT: CPT

## 2021-10-10 RX ORDER — SODIUM CHLORIDE 9 MG/ML
1000 INJECTION INTRAMUSCULAR; INTRAVENOUS; SUBCUTANEOUS
Refills: 0 | Status: DISCONTINUED | OUTPATIENT
Start: 2021-10-10 | End: 2021-10-13

## 2021-10-10 RX ORDER — FAMOTIDINE 10 MG/ML
20 INJECTION INTRAVENOUS ONCE
Refills: 0 | Status: COMPLETED | OUTPATIENT
Start: 2021-10-10 | End: 2021-10-10

## 2021-10-10 RX ORDER — ONDANSETRON 8 MG/1
4 TABLET, FILM COATED ORAL ONCE
Refills: 0 | Status: COMPLETED | OUTPATIENT
Start: 2021-10-10 | End: 2021-10-10

## 2021-10-10 RX ORDER — IPRATROPIUM/ALBUTEROL SULFATE 18-103MCG
3 AEROSOL WITH ADAPTER (GRAM) INHALATION
Refills: 0 | Status: DISCONTINUED | OUTPATIENT
Start: 2021-10-10 | End: 2021-10-13

## 2021-10-10 RX ADMIN — Medication 3 MILLILITER(S): at 13:05

## 2021-10-10 NOTE — ED ADULT NURSE NOTE - NSFALLRSKHMRSKTYOTFT_ED_ALL_ED
uncooperative with questions for medical history; pt irritated with RN and prefers to speak with MD only

## 2021-10-10 NOTE — ED ADULT NURSE NOTE - CAS ED AMA FORM SIGNED YN
Dr. Gómez and RN walked to door w patient in hallway attempting to investigate and address concerns; pt irritable and yelled at staff, declined to discuss further. Escorted by staff to front door for safety, strongly encouraged close f/u at [facility] as pt has been there before./Patient refused

## 2021-10-10 NOTE — ED ADULT NURSE NOTE - NSIMPLEMENTINTERV_GEN_ALL_ED
Implemented All Fall with Harm Risk Interventions:  [place] to call system. Call bell, personal items and telephone within reach. Instruct patient to call for assistance. Room bathroom lighting operational. Non-slip footwear when patient is off stretcher. Physically safe environment: no spills, clutter or unnecessary equipment. Stretcher in lowest position, wheels locked, appropriate side rails in place. Provide visual cue, wrist band, yellow gown, etc. Monitor gait and stability. Monitor for mental status changes and reorient to person, place, and time. Review medications for side effects contributing to fall risk. Reinforce activity limits and safety measures with patient and family. Provide visual clues: red socks.

## 2021-10-10 NOTE — ED PROVIDER NOTE - CHPI ED SYMPTOMS NEG
no diarrhea/no fever/no chills
no loss of consciousness, no gait abnormality, no headache, no sensory deficits, and no weakness.

## 2021-10-10 NOTE — ED ADULT NURSE NOTE - OBJECTIVE STATEMENT
Pt with multiple medical complaints; has had generalized fatigue last few days at shelter, previously noticed "black" stool from ostomy (currently soft brown output with pink stoma), drowsy and said has been sleeping poorly at shelter.

## 2021-10-10 NOTE — ED PROVIDER NOTE - PROGRESS NOTE DETAILS
labs noted, repeated. Throughout ED stay, pt was difficult with obtaining labs, uncooperative with assessment and care. pt became belligerent and hostile, refusing further ER care and admission to the hospital. He is AAOx3 and aware of the r/b/a to leaving the hospital. Despite my conversation and encouragement to stay, he left the hospital. The patient is alert, cooperative with fluent and appropriate speech. The patient is requesting to leave the Emergency Department despite the recommendation of admission to the hospital.  The patient verbally expresses understanding of the risks of leaving including the possibility of permanent disability and death. The risks, benefits, hazards, alternatives and precautions pertaining to the patient's specific medical issue were discussed.  The patient verbalizes understanding of these risk, benefits, alternative and precautions back to me.  All lab and test results available at the time of the discussion with the patient were addressed.  The patient has been made aware that he/she is welcome to return at anytime to the Emergency Department for continued care. Close follow up with primary care doctor is recommended as soon as possible.  All of the patient's questions were answered.

## 2021-10-10 NOTE — ED PROVIDER NOTE - NSICDXPASTMEDICALHX_GEN_ALL_CORE_FT
PAST MEDICAL HISTORY:  Anemia     DM (diabetes mellitus)     ETOH abuse     HLD (hyperlipidemia)     HTN (hypertension)

## 2021-10-10 NOTE — ED PROVIDER NOTE - CLINICAL SUMMARY MEDICAL DECISION MAKING FREE TEXT BOX
Patient with a myriad of complaints which include, chest pain, SANDS, and vomiting. Will give Patient a breathing treatment in the ED, obtain medical screening labs, and do chest x-ray.

## 2021-10-10 NOTE — ED PROVIDER NOTE - PATIENT PORTAL LINK FT
You can access the FollowMyHealth Patient Portal offered by [facility] by registering at the following website: [email or web address]. By joining [facility]’s FollowMyHealth portal, you will also be able to view your health information using other applications (apps) compatible with our system.

## 2021-10-10 NOTE — ED PROVIDER NOTE - OBJECTIVE STATEMENT
60 y/o male with PMHx of HTN, DM complicated by neuropathy, EtOH liver cirrhosis, hx IVDU, toxic megacolon c/b pneumatosis s/p colectomy/ileostomy presents to the ED with a myriad of complaints. Patient is c/o SANDS and chest pain x 2 months with associated nonproductive cough. He reports that he is only able to take 10 steps before feeling SOB and needing to rest. Denies previous evaluations with cardiology. He is also c/o headache and dizziness. He also reports vomiting x 1 month after every meal he eats. Patient currently lives in a shelter and attributes his symptoms to the food he is given. He reports that his stomach feels good prior to eating and after vomiting.     Prior chart review shows that the Patient was admitted to [facility] ICU in July 2021 60 y/o male with PMHx of HTN, DM complicated by neuropathy, EtOH liver cirrhosis, hx IVDU, toxic megacolon c/b pneumatosis s/p colectomy/ileostomy presents to the ED with a myriad of complaints. Patient is c/o SANDS and chest pain x 2 months with associated nonproductive cough. He reports that he is only able to take 10 steps before feeling SOB and needing to rest. Denies previous evaluations with cardiology. He is also c/o headache and dizziness. He also reports vomiting x 1 month after every meal he eats. Patient currently lives in a shelter and attributes his symptoms to the food he is given. He reports that his stomach feels good prior to eating and after vomiting. Denies fever, chills, diarrhea.     Prior chart review shows that the Patient was admitted to [facility] ICU in July 2021 for hyponatremia and hyperkalemia with EKG changes. At the time, Patient denied any cardiac history, stills drinks once in a while, and chews tobacco daily.

## 2021-10-20 NOTE — PROGRESS NOTE ADULT - PROBLEM SELECTOR PLAN 4
Continue medical therapy with the goal of weaning supplemental oxygen Nonoliguric MEG 2/2 likey ATN, per nephrology. Per renal, likely combination of pre-renal, intrinsic cause 2/2 COVID-19 and contrast exposure on 4/10 vs vancomycin toxicity.   - previously on albumin, octreotide, and midodrine for albumin trial for possible hepatorenal syndrome, but without improvement in kidney function  - FeNa 2.0% and FeUrea 63% intrinsic disease  - Cr downtrending, renal says no indication of HD; follow up outpatient  - net even fluid balance   - renal recommending gentle IV hydration if high GI output  - avoid ACE/ARB/NSAIDs/metformin/contrast   - strict I/Os   - appreciate renal recs    #hyponatremia   RESOLVED     #metabolic acidosis   RESOLVED  Previously on sodium bicarbonate tabs TID

## 2022-01-04 NOTE — PROGRESS NOTE ADULT - ATTENDING COMMENTS
Paloma is a 3 Olive Adame MD, MD        Subjective   Paloma is a 3 [employer]   Glucoses remain well controlled on modest doses of insulin, and will decrease slightly to 5 units Lantus/4 units lispro AC.  Given the low insulin requirements and the pt's lack of insurance, will plan to discharge on sulfonylurea (likely glimepiride).  Will continue to watch for a rise In TSH off levothyroxine.  Gabapentin should be increased to 200 mg q8h  (It is now written only for qday)

## 2022-01-05 VITALS
DIASTOLIC BLOOD PRESSURE: 52 MMHG | RESPIRATION RATE: 18 BRPM | HEIGHT: 66 IN | OXYGEN SATURATION: 95 % | SYSTOLIC BLOOD PRESSURE: 82 MMHG | WEIGHT: 164.91 LBS | HEART RATE: 65 BPM | TEMPERATURE: 98 F

## 2022-01-05 LAB
ALBUMIN SERPL ELPH-MCNC: 4.2 G/DL — SIGNIFICANT CHANGE UP (ref 3.4–5)
ALP SERPL-CCNC: 104 U/L — SIGNIFICANT CHANGE UP (ref 40–120)
ALT FLD-CCNC: 12 U/L — SIGNIFICANT CHANGE UP (ref 12–42)
ANION GAP SERPL CALC-SCNC: 12 MMOL/L — SIGNIFICANT CHANGE UP (ref 9–16)
AST SERPL-CCNC: 8 U/L — LOW (ref 15–37)
BASOPHILS # BLD AUTO: 0.04 K/UL — SIGNIFICANT CHANGE UP (ref 0–0.2)
BASOPHILS NFR BLD AUTO: 0.4 % — SIGNIFICANT CHANGE UP (ref 0–2)
BILIRUB SERPL-MCNC: 0.4 MG/DL — SIGNIFICANT CHANGE UP (ref 0.2–1.2)
BUN SERPL-MCNC: 72 MG/DL — HIGH (ref 7–23)
CALCIUM SERPL-MCNC: 9.7 MG/DL — SIGNIFICANT CHANGE UP (ref 8.5–10.5)
CHLORIDE SERPL-SCNC: 90 MMOL/L — LOW (ref 96–108)
CO2 SERPL-SCNC: 17 MMOL/L — LOW (ref 22–31)
CREAT SERPL-MCNC: 5.95 MG/DL — HIGH (ref 0.5–1.3)
EOSINOPHIL # BLD AUTO: 0.08 K/UL — SIGNIFICANT CHANGE UP (ref 0–0.5)
EOSINOPHIL NFR BLD AUTO: 0.7 % — SIGNIFICANT CHANGE UP (ref 0–6)
ETHANOL SERPL-MCNC: <3 MG/DL — SIGNIFICANT CHANGE UP
GLUCOSE SERPL-MCNC: 138 MG/DL — HIGH (ref 70–99)
HCT VFR BLD CALC: 33.1 % — LOW (ref 39–50)
HGB BLD-MCNC: 11 G/DL — LOW (ref 13–17)
IMM GRANULOCYTES NFR BLD AUTO: 0.4 % — SIGNIFICANT CHANGE UP (ref 0–1.5)
LACTATE SERPL-SCNC: 1 MMOL/L — SIGNIFICANT CHANGE UP (ref 0.4–2)
LIDOCAIN IGE QN: 393 U/L — SIGNIFICANT CHANGE UP (ref 73–393)
LYMPHOCYTES # BLD AUTO: 1.31 K/UL — SIGNIFICANT CHANGE UP (ref 1–3.3)
LYMPHOCYTES # BLD AUTO: 11.6 % — LOW (ref 13–44)
MCHC RBC-ENTMCNC: 29.8 PG — SIGNIFICANT CHANGE UP (ref 27–34)
MCHC RBC-ENTMCNC: 33.2 GM/DL — SIGNIFICANT CHANGE UP (ref 32–36)
MCV RBC AUTO: 89.7 FL — SIGNIFICANT CHANGE UP (ref 80–100)
MONOCYTES # BLD AUTO: 0.86 K/UL — SIGNIFICANT CHANGE UP (ref 0–0.9)
MONOCYTES NFR BLD AUTO: 7.6 % — SIGNIFICANT CHANGE UP (ref 2–14)
NEUTROPHILS # BLD AUTO: 8.92 K/UL — HIGH (ref 1.8–7.4)
NEUTROPHILS NFR BLD AUTO: 79.3 % — HIGH (ref 43–77)
NRBC # BLD: 0 /100 WBCS — SIGNIFICANT CHANGE UP (ref 0–0)
PLATELET # BLD AUTO: 271 K/UL — SIGNIFICANT CHANGE UP (ref 150–400)
POTASSIUM SERPL-MCNC: 8 MMOL/L — CRITICAL HIGH (ref 3.5–5.3)
POTASSIUM SERPL-SCNC: 8 MMOL/L — CRITICAL HIGH (ref 3.5–5.3)
PROT SERPL-MCNC: 10.3 G/DL — HIGH (ref 6.4–8.2)
RBC # BLD: 3.69 M/UL — LOW (ref 4.2–5.8)
RBC # FLD: 14.4 % — SIGNIFICANT CHANGE UP (ref 10.3–14.5)
SARS-COV-2 RNA SPEC QL NAA+PROBE: SIGNIFICANT CHANGE UP
SODIUM SERPL-SCNC: 119 MMOL/L — CRITICAL LOW (ref 132–145)
TROPONIN I, HIGH SENSITIVITY RESULT: 10.8 NG/L — SIGNIFICANT CHANGE UP
WBC # BLD: 11.25 K/UL — HIGH (ref 3.8–10.5)
WBC # FLD AUTO: 11.25 K/UL — HIGH (ref 3.8–10.5)

## 2022-01-05 PROCEDURE — 71045 X-RAY EXAM CHEST 1 VIEW: CPT | Mod: 26

## 2022-01-05 RX ORDER — CALCIUM GLUCONATE 100 MG/ML
2 VIAL (ML) INTRAVENOUS ONCE
Refills: 0 | Status: COMPLETED | OUTPATIENT
Start: 2022-01-05 | End: 2022-01-05

## 2022-01-05 RX ORDER — ALBUTEROL 90 UG/1
2.5 AEROSOL, METERED ORAL ONCE
Refills: 0 | Status: COMPLETED | OUTPATIENT
Start: 2022-01-05 | End: 2022-01-05

## 2022-01-05 RX ORDER — DEXTROSE 50 % IN WATER 50 %
50 SYRINGE (ML) INTRAVENOUS ONCE
Refills: 0 | Status: COMPLETED | OUTPATIENT
Start: 2022-01-05 | End: 2022-01-05

## 2022-01-05 RX ORDER — INSULIN HUMAN 100 [IU]/ML
10 INJECTION, SOLUTION SUBCUTANEOUS ONCE
Refills: 0 | Status: COMPLETED | OUTPATIENT
Start: 2022-01-05 | End: 2022-01-05

## 2022-01-05 RX ORDER — SODIUM CHLORIDE 9 MG/ML
1000 INJECTION INTRAMUSCULAR; INTRAVENOUS; SUBCUTANEOUS ONCE
Refills: 0 | Status: COMPLETED | OUTPATIENT
Start: 2022-01-05 | End: 2022-01-05

## 2022-01-05 RX ORDER — FUROSEMIDE 40 MG
40 TABLET ORAL ONCE
Refills: 0 | Status: COMPLETED | OUTPATIENT
Start: 2022-01-05 | End: 2022-01-05

## 2022-01-05 RX ORDER — CEFTRIAXONE 500 MG/1
1000 INJECTION, POWDER, FOR SOLUTION INTRAMUSCULAR; INTRAVENOUS ONCE
Refills: 0 | Status: COMPLETED | OUTPATIENT
Start: 2022-01-05 | End: 2022-01-05

## 2022-01-05 RX ADMIN — INSULIN HUMAN 10 UNIT(S): 100 INJECTION, SOLUTION SUBCUTANEOUS at 23:25

## 2022-01-05 RX ADMIN — SODIUM CHLORIDE 1000 MILLILITER(S): 9 INJECTION INTRAMUSCULAR; INTRAVENOUS; SUBCUTANEOUS at 22:40

## 2022-01-05 RX ADMIN — ALBUTEROL 2.5 MILLIGRAM(S): 90 AEROSOL, METERED ORAL at 23:27

## 2022-01-05 RX ADMIN — Medication 100 GRAM(S): at 23:27

## 2022-01-05 RX ADMIN — SODIUM CHLORIDE 1000 MILLILITER(S): 9 INJECTION INTRAMUSCULAR; INTRAVENOUS; SUBCUTANEOUS at 23:27

## 2022-01-05 RX ADMIN — Medication 50 MILLILITER(S): at 23:26

## 2022-01-05 RX ADMIN — Medication 40 MILLIGRAM(S): at 23:47

## 2022-01-05 NOTE — ED PROVIDER NOTE - OBJECTIVE STATEMENT
62yo M hx of etoh cirrhosis, HTN, DM2 w neuropathy, hx of toxic megacolon c/b pneumatosis s/p colectomy and ileostomy, admission 6 months ago for hyperkalemia and hyponatremia, presents with generalized weakness and lightheadedness.  Pt is poor historian.  Denies fever, chills, nausea, vomiting.  Denies cough, chest pain, or shortness of breath.

## 2022-01-05 NOTE — ED PROVIDER NOTE - PHYSICAL EXAMINATION
Constitutional:  Well appearing, awake, alert, oriented to person, place, time/situation and in no apparent distress.  HEENT: Airway patent, Nasal mucosa clear. Mouth with normal mucosa. Throat has no vesicles, no oropharyngeal exudates and uvula is midline.  Eyes: Clear bilaterally, pupils equal, round and reactive to light.  Cardiac: Normal rate, regular rhythm.  Respiratory: Breath sounds clear and equal bilaterally.  GI: numerous abd scars, ostomy in place.  Abdomen soft, non-tender, no guarding.  MSK: Spine appears normal, range of motion is not limited, no muscle or joint tenderness  Neuro: Alert and oriented, no focal deficits, no motor or sensory deficits.  Skin: Skin normal color for race, warm, dry and intact. No evidence of rash.

## 2022-01-05 NOTE — ED PROVIDER NOTE - NS ED ROS FT
Constitutional:  No fever, No chills, No night sweats  Eyes:  No visual changes, No discharge, No redness  ENMT:  No epistaxis, no nasal congestion, no throat pain, no difficulty swallowing  CV:  No chest pain, No palpitations, No peripheral edema  Resp:  No cough, No shortness of breath  GI:  No abdominal pain, No vomiting, No diarrhea  MSK:  No neck pain or stiffness, No joint swelling or pain, No back pain  Neuro: no loss of consciousness, no gait abnormality, no headache, no sensory deficits, and no weakness.  Skin:  No abrasions, no lesions, no rashes  Psych:  No known mental health issues

## 2022-01-05 NOTE — ED PROVIDER NOTE - CRITICAL CARE ATTENDING CONTRIBUTION TO CARE
See note.  Pt w severe hyperkalemia with EKG changes requiring numerous rounds of meds and close monitoring in ED.

## 2022-01-05 NOTE — ED PROVIDER NOTE - CLINICAL SUMMARY MEDICAL DECISION MAKING FREE TEXT BOX
60yo M hx of etoh cirrhosis, colectomy w ileostomy, hx of IVDU, presents with generalized weakness and lightheadedness.  Hypotensive on arrival to ED, improved after IV hydration.  No focal neuro deficits.  Lungs CTAB.  Abd w numerous surgical scars and ostomy.  Ddx is broad including ACS, sepsis, dehydration, electrolyte abn, other.  Labs, CXR, COVID swab, monitor, reassess. 62yo M hx of etoh cirrhosis, colectomy w ileostomy, hx of IVDU, presents with generalized weakness and lightheadedness.  Hypotensive on arrival to ED, improved after IV hydration.  No focal neuro deficits.  Lungs CTAB.  Abd w numerous surgical scars and ostomy.  Ddx is broad including ACS, sepsis, dehydration, electrolyte abn, other.  Labs, CXR, COVID swab, empiric abx, monitor, reassess.

## 2022-01-05 NOTE — ED ADULT NURSE NOTE - NSIMPLEMENTINTERV_GEN_ALL_ED
Implemented All Fall Risk Interventions:  [place] to call system. Call bell, personal items and telephone within reach. Instruct patient to call for assistance. Room bathroom lighting operational. Non-slip footwear when patient is off stretcher. Physically safe environment: no spills, clutter or unnecessary equipment. Stretcher in lowest position, wheels locked, appropriate side rails in place. Provide visual cue, wrist band, yellow gown, etc. Monitor gait and stability. Monitor for mental status changes and reorient to person, place, and time. Review medications for side effects contributing to fall risk. Reinforce activity limits and safety measures with patient and family.

## 2022-01-05 NOTE — ED ADULT TRIAGE NOTE - CHIEF COMPLAINT QUOTE
"I feel weak, dizzy, and nauseous. I think my sodium is low they told me today." BIB EMS from shelter.

## 2022-01-05 NOTE — ED PROVIDER NOTE - PROGRESS NOTE DETAILS
Initial EKG w peaked T waves and wide QRS.  K >8.  Pt given insulin/ d50, albuterol, calcium gluconate 2g, lasix 40mg.  Repeat EKG w narrow QRS.    Repeat K still 8.  Pt given another dose of insulin/ D50, further albuterol. Initial EKG w peaked T waves and wide QRS.  K >8, Cr 5.95 up from 1.7.  Pt will require emergent dialysis.  Plan to stabilize prior to transfer to .  Pt given insulin/ d50, albuterol, calcium gluconate 2g, lasix 40mg.  Repeat EKG w narrow QRS.    Repeat K still 8.  Pt given another dose of insulin/ D50, further albuterol.  No e/o fluid overload on CXR; pt satting 100% on RA. K now 5.7.  Pt hemodynamically stable.  Mental status markedly improved from time of arrival to ED.    pH 7.10.  Pt given amp of bicarb.    No urine output despite 3L NS in ED.    Discussed w ICU MD Dr. Schwab.  Will admit pt to step down.

## 2022-01-05 NOTE — ED ADULT NURSE NOTE - OBJECTIVE STATEMENT
Pt came in c/o of dizziness/weakness/nausea x today. Pt states "my sodium is low". PMH of colon cancer. Ostomy in RLQ, ostomy beefy red. Arrives without an ostomy bag. Ostomy bag placed. Reports having surgery to head/neck; unable to specify what surgery. Poor historian. Ambulatory with walker at baseline. Denies fever, chills, sob, cp, n/v/d. A&Ox3 speaking in full sentences. On continuous hr/o2 monitoring Pt came in c/o of dizziness/weakness/nausea x today. Pt states "my sodium is low". PMH of colon cancer. Ostomy in RLQ, ostomy beefy red. Arrives without an ostomy bag. Ostomy bag placed. Reports having surgery to head/neck; unable to specify what surgery/ hyperkalemia in the past. Poor historian. Ambulatory with walker at baseline. Denies fever, chills, sob, cp, n/v/d. A&Ox3 speaking in full sentences. On continuous hr/o2 monitoring Pt came in c/o of dizziness/weakness/nausea x today. Reports minimal urine output/po intake. Pt states "my sodium is low". PMH of colon cancer. Ostomy in RLQ, ostomy beefy red. Arrives without an ostomy bag. Ostomy bag placed. Reports having surgery to head/neck; unable to specify what surgery/ hyperkalemia in the past. Poor historian. Ambulatory with walker at baseline. Denies fever, chills, sob, cp, n/v/d. A&Ox3 speaking in full sentences. On continuous hr/o2 monitoring

## 2022-01-06 ENCOUNTER — INPATIENT (INPATIENT)
Facility: HOSPITAL | Age: 62
LOS: 12 days | Discharge: HOME | DRG: 682 | End: 2022-01-19
Attending: HOSPITALIST | Admitting: STUDENT IN AN ORGANIZED HEALTH CARE EDUCATION/TRAINING PROGRAM
Payer: MEDICAID

## 2022-01-06 DIAGNOSIS — I10 ESSENTIAL (PRIMARY) HYPERTENSION: ICD-10-CM

## 2022-01-06 DIAGNOSIS — N39.0 URINARY TRACT INFECTION, SITE NOT SPECIFIED: ICD-10-CM

## 2022-01-06 DIAGNOSIS — E78.5 HYPERLIPIDEMIA, UNSPECIFIED: ICD-10-CM

## 2022-01-06 DIAGNOSIS — R00.1 BRADYCARDIA, UNSPECIFIED: ICD-10-CM

## 2022-01-06 DIAGNOSIS — Z29.9 ENCOUNTER FOR PROPHYLACTIC MEASURES, UNSPECIFIED: ICD-10-CM

## 2022-01-06 DIAGNOSIS — Z98.890 OTHER SPECIFIED POSTPROCEDURAL STATES: Chronic | ICD-10-CM

## 2022-01-06 DIAGNOSIS — E87.5 HYPERKALEMIA: ICD-10-CM

## 2022-01-06 DIAGNOSIS — R63.8 OTHER SYMPTOMS AND SIGNS CONCERNING FOOD AND FLUID INTAKE: ICD-10-CM

## 2022-01-06 DIAGNOSIS — E87.1 HYPO-OSMOLALITY AND HYPONATREMIA: ICD-10-CM

## 2022-01-06 DIAGNOSIS — Z90.49 ACQUIRED ABSENCE OF OTHER SPECIFIED PARTS OF DIGESTIVE TRACT: Chronic | ICD-10-CM

## 2022-01-06 DIAGNOSIS — E11.9 TYPE 2 DIABETES MELLITUS WITHOUT COMPLICATIONS: ICD-10-CM

## 2022-01-06 DIAGNOSIS — N17.9 ACUTE KIDNEY FAILURE, UNSPECIFIED: ICD-10-CM

## 2022-01-06 DIAGNOSIS — Z93.3 COLOSTOMY STATUS: ICD-10-CM

## 2022-01-06 LAB
A1C WITH ESTIMATED AVERAGE GLUCOSE RESULT: 6.7 % — HIGH (ref 4–5.6)
ALBUMIN SERPL ELPH-MCNC: 3.9 G/DL — SIGNIFICANT CHANGE UP (ref 3.3–5)
ALP SERPL-CCNC: 84 U/L — SIGNIFICANT CHANGE UP (ref 40–120)
ALT FLD-CCNC: 6 U/L — LOW (ref 10–45)
ANION GAP SERPL CALC-SCNC: 14 MMOL/L — SIGNIFICANT CHANGE UP (ref 5–17)
ANION GAP SERPL CALC-SCNC: 14 MMOL/L — SIGNIFICANT CHANGE UP (ref 5–17)
ANION GAP SERPL CALC-SCNC: 17 MMOL/L — SIGNIFICANT CHANGE UP (ref 5–17)
APPEARANCE UR: ABNORMAL
AST SERPL-CCNC: 9 U/L — LOW (ref 10–40)
BACTERIA # UR AUTO: PRESENT /HPF
BASE EXCESS BLDA CALC-SCNC: -12.9 MMOL/L — LOW (ref -2–3)
BASOPHILS # BLD AUTO: 0.03 K/UL — SIGNIFICANT CHANGE UP (ref 0–0.2)
BASOPHILS NFR BLD AUTO: 0.2 % — SIGNIFICANT CHANGE UP (ref 0–2)
BILIRUB SERPL-MCNC: 0.2 MG/DL — SIGNIFICANT CHANGE UP (ref 0.2–1.2)
BILIRUB UR-MCNC: ABNORMAL
BUN SERPL-MCNC: 30 MG/DL — HIGH (ref 7–23)
BUN SERPL-MCNC: 63 MG/DL — HIGH (ref 7–23)
BUN SERPL-MCNC: 63 MG/DL — HIGH (ref 7–23)
CALCIUM SERPL-MCNC: 10 MG/DL — SIGNIFICANT CHANGE UP (ref 8.4–10.5)
CALCIUM SERPL-MCNC: 9 MG/DL — SIGNIFICANT CHANGE UP (ref 8.4–10.5)
CALCIUM SERPL-MCNC: 9.5 MG/DL — SIGNIFICANT CHANGE UP (ref 8.4–10.5)
CHLORIDE SERPL-SCNC: 96 MMOL/L — SIGNIFICANT CHANGE UP (ref 96–108)
CHLORIDE SERPL-SCNC: 99 MMOL/L — SIGNIFICANT CHANGE UP (ref 96–108)
CHLORIDE SERPL-SCNC: 99 MMOL/L — SIGNIFICANT CHANGE UP (ref 96–108)
CHLORIDE UR-SCNC: 77 MMOL/L — SIGNIFICANT CHANGE UP
CK SERPL-CCNC: 74 U/L — SIGNIFICANT CHANGE UP (ref 30–200)
CO2 BLDA-SCNC: 15 MMOL/L — LOW (ref 19–24)
CO2 SERPL-SCNC: 11 MMOL/L — LOW (ref 22–31)
CO2 SERPL-SCNC: 19 MMOL/L — LOW (ref 22–31)
CO2 SERPL-SCNC: 8 MMOL/L — CRITICAL LOW (ref 22–31)
COLOR SPEC: YELLOW — SIGNIFICANT CHANGE UP
COMMENT - URINE: SIGNIFICANT CHANGE UP
CREAT SERPL-MCNC: 3.66 MG/DL — HIGH (ref 0.5–1.3)
CREAT SERPL-MCNC: 6.47 MG/DL — HIGH (ref 0.5–1.3)
CREAT SERPL-MCNC: 6.52 MG/DL — HIGH (ref 0.5–1.3)
DIFF PNL FLD: ABNORMAL
EOSINOPHIL # BLD AUTO: 0.03 K/UL — SIGNIFICANT CHANGE UP (ref 0–0.5)
EOSINOPHIL NFR BLD AUTO: 0.2 % — SIGNIFICANT CHANGE UP (ref 0–6)
ESTIMATED AVERAGE GLUCOSE: 146 MG/DL — HIGH (ref 68–114)
GAS PNL BLDA: SIGNIFICANT CHANGE UP
GLUCOSE BLDC GLUCOMTR-MCNC: 134 MG/DL — HIGH (ref 70–99)
GLUCOSE BLDC GLUCOMTR-MCNC: 141 MG/DL — HIGH (ref 70–99)
GLUCOSE BLDC GLUCOMTR-MCNC: 165 MG/DL — HIGH (ref 70–99)
GLUCOSE BLDC GLUCOMTR-MCNC: 211 MG/DL — HIGH (ref 70–99)
GLUCOSE BLDC GLUCOMTR-MCNC: 258 MG/DL — HIGH (ref 70–99)
GLUCOSE BLDC GLUCOMTR-MCNC: 292 MG/DL — HIGH (ref 70–99)
GLUCOSE BLDC GLUCOMTR-MCNC: 340 MG/DL — HIGH (ref 70–99)
GLUCOSE SERPL-MCNC: 146 MG/DL — HIGH (ref 70–99)
GLUCOSE SERPL-MCNC: 146 MG/DL — HIGH (ref 70–99)
GLUCOSE SERPL-MCNC: 174 MG/DL — HIGH (ref 70–99)
GLUCOSE UR QL: NEGATIVE — SIGNIFICANT CHANGE UP
HBV CORE AB SER-ACNC: SIGNIFICANT CHANGE UP
HBV SURFACE AB SER-ACNC: SIGNIFICANT CHANGE UP
HBV SURFACE AG SER-ACNC: SIGNIFICANT CHANGE UP
HCO3 BLDA-SCNC: 14 MMOL/L — LOW (ref 21–28)
HCT VFR BLD CALC: 29.6 % — LOW (ref 39–50)
HCT VFR BLD CALC: 33.4 % — LOW (ref 39–50)
HCV AB S/CO SERPL IA: 0.08 S/CO — SIGNIFICANT CHANGE UP
HCV AB SERPL-IMP: SIGNIFICANT CHANGE UP
HGB BLD-MCNC: 11.1 G/DL — LOW (ref 13–17)
HGB BLD-MCNC: 9.6 G/DL — LOW (ref 13–17)
IMM GRANULOCYTES NFR BLD AUTO: 0.6 % — SIGNIFICANT CHANGE UP (ref 0–1.5)
KETONES UR-MCNC: NEGATIVE — SIGNIFICANT CHANGE UP
LEUKOCYTE ESTERASE UR-ACNC: ABNORMAL
LYMPHOCYTES # BLD AUTO: 1.07 K/UL — SIGNIFICANT CHANGE UP (ref 1–3.3)
LYMPHOCYTES # BLD AUTO: 8.9 % — LOW (ref 13–44)
MAGNESIUM SERPL-MCNC: 2.2 MG/DL — SIGNIFICANT CHANGE UP (ref 1.6–2.6)
MAGNESIUM SERPL-MCNC: 2.2 MG/DL — SIGNIFICANT CHANGE UP (ref 1.6–2.6)
MCHC RBC-ENTMCNC: 28.6 PG — SIGNIFICANT CHANGE UP (ref 27–34)
MCHC RBC-ENTMCNC: 29.8 PG — SIGNIFICANT CHANGE UP (ref 27–34)
MCHC RBC-ENTMCNC: 32.4 GM/DL — SIGNIFICANT CHANGE UP (ref 32–36)
MCHC RBC-ENTMCNC: 33.2 GM/DL — SIGNIFICANT CHANGE UP (ref 32–36)
MCV RBC AUTO: 88.1 FL — SIGNIFICANT CHANGE UP (ref 80–100)
MCV RBC AUTO: 89.8 FL — SIGNIFICANT CHANGE UP (ref 80–100)
MONOCYTES # BLD AUTO: 1.7 K/UL — HIGH (ref 0–0.9)
MONOCYTES NFR BLD AUTO: 14.1 % — HIGH (ref 2–14)
NEUTROPHILS # BLD AUTO: 9.15 K/UL — HIGH (ref 1.8–7.4)
NEUTROPHILS NFR BLD AUTO: 76 % — SIGNIFICANT CHANGE UP (ref 43–77)
NITRITE UR-MCNC: POSITIVE
NRBC # BLD: 0 /100 WBCS — SIGNIFICANT CHANGE UP (ref 0–0)
NRBC # BLD: 0 /100 WBCS — SIGNIFICANT CHANGE UP (ref 0–0)
OSMOLALITY SERPL: 292 MOSM/KG — SIGNIFICANT CHANGE UP (ref 280–301)
OSMOLALITY UR: 304 MOSM/KG — SIGNIFICANT CHANGE UP (ref 300–900)
PCO2 BLDA: 35 MMHG — SIGNIFICANT CHANGE UP (ref 35–48)
PCO2 BLDV: 46 MMHG — SIGNIFICANT CHANGE UP (ref 42–55)
PH BLDA: 7.21 — LOW (ref 7.35–7.45)
PH BLDV: 7.1 — LOW (ref 7.32–7.43)
PH UR: 6.5 — SIGNIFICANT CHANGE UP (ref 5–8)
PH UR: 6.5 — SIGNIFICANT CHANGE UP (ref 5–8)
PHOSPHATE SERPL-MCNC: 6.5 MG/DL — HIGH (ref 2.5–4.5)
PHOSPHATE SERPL-MCNC: 7.1 MG/DL — HIGH (ref 2.5–4.5)
PLATELET # BLD AUTO: 199 K/UL — SIGNIFICANT CHANGE UP (ref 150–400)
PLATELET # BLD AUTO: 216 K/UL — SIGNIFICANT CHANGE UP (ref 150–400)
PO2 BLDA: 96 MMHG — SIGNIFICANT CHANGE UP (ref 83–108)
PO2 BLDV: 38 MMHG — SIGNIFICANT CHANGE UP (ref 25–45)
POTASSIUM SERPL-MCNC: 4.5 MMOL/L — SIGNIFICANT CHANGE UP (ref 3.5–5.3)
POTASSIUM SERPL-MCNC: 5.7 MMOL/L — HIGH (ref 3.5–5.3)
POTASSIUM SERPL-MCNC: 6.9 MMOL/L — CRITICAL HIGH (ref 3.5–5.3)
POTASSIUM SERPL-MCNC: 7.3 MMOL/L — CRITICAL HIGH (ref 3.5–5.3)
POTASSIUM SERPL-MCNC: 7.6 MMOL/L — CRITICAL HIGH (ref 3.5–5.3)
POTASSIUM SERPL-MCNC: 8 MMOL/L — CRITICAL HIGH (ref 3.5–5.3)
POTASSIUM SERPL-MCNC: SIGNIFICANT CHANGE UP (ref 3.5–5.3)
POTASSIUM SERPL-SCNC: 4.5 MMOL/L — SIGNIFICANT CHANGE UP (ref 3.5–5.3)
POTASSIUM SERPL-SCNC: 5.7 MMOL/L — HIGH (ref 3.5–5.3)
POTASSIUM SERPL-SCNC: 6.9 MMOL/L — CRITICAL HIGH (ref 3.5–5.3)
POTASSIUM SERPL-SCNC: 7.3 MMOL/L — CRITICAL HIGH (ref 3.5–5.3)
POTASSIUM SERPL-SCNC: 7.6 MMOL/L — CRITICAL HIGH (ref 3.5–5.3)
POTASSIUM SERPL-SCNC: 8 MMOL/L — CRITICAL HIGH (ref 3.5–5.3)
POTASSIUM SERPL-SCNC: SIGNIFICANT CHANGE UP (ref 3.5–5.3)
POTASSIUM UR-SCNC: 13 MMOL/L — SIGNIFICANT CHANGE UP
PROT ?TM UR-MCNC: 227 MG/DL — HIGH (ref 0–12)
PROT SERPL-MCNC: 7.9 G/DL — SIGNIFICANT CHANGE UP (ref 6–8.3)
PROT UR-MCNC: >=300 MG/DL
RBC # BLD: 3.36 M/UL — LOW (ref 4.2–5.8)
RBC # BLD: 3.72 M/UL — LOW (ref 4.2–5.8)
RBC # FLD: 14.1 % — SIGNIFICANT CHANGE UP (ref 10.3–14.5)
RBC # FLD: 14.2 % — SIGNIFICANT CHANGE UP (ref 10.3–14.5)
SAO2 % BLDA: 98.7 % — HIGH (ref 94–98)
SAO2 % BLDV: 68.5 % — SIGNIFICANT CHANGE UP (ref 67–88)
SODIUM SERPL-SCNC: 121 MMOL/L — LOW (ref 135–145)
SODIUM SERPL-SCNC: 124 MMOL/L — LOW (ref 135–145)
SODIUM SERPL-SCNC: 132 MMOL/L — LOW (ref 135–145)
SODIUM UR-SCNC: 106 MMOL/L — SIGNIFICANT CHANGE UP
SP GR SPEC: 1.02 — SIGNIFICANT CHANGE UP (ref 1–1.03)
TSH SERPL-MCNC: 1.12 UIU/ML — SIGNIFICANT CHANGE UP (ref 0.27–4.2)
UROBILINOGEN FLD QL: 0.2 E.U./DL — SIGNIFICANT CHANGE UP
UUN UR-MCNC: <3 MG/DL — SIGNIFICANT CHANGE UP
WBC # BLD: 12.05 K/UL — HIGH (ref 3.8–10.5)
WBC # BLD: 7.41 K/UL — SIGNIFICANT CHANGE UP (ref 3.8–10.5)
WBC # FLD AUTO: 12.05 K/UL — HIGH (ref 3.8–10.5)
WBC # FLD AUTO: 7.41 K/UL — SIGNIFICANT CHANGE UP (ref 3.8–10.5)
WBC UR QL: ABNORMAL /HPF

## 2022-01-06 PROCEDURE — 90935 HEMODIALYSIS ONE EVALUATION: CPT

## 2022-01-06 PROCEDURE — 93010 ELECTROCARDIOGRAM REPORT: CPT | Mod: 76

## 2022-01-06 PROCEDURE — 36010 PLACE CATHETER IN VEIN: CPT

## 2022-01-06 PROCEDURE — 76937 US GUIDE VASCULAR ACCESS: CPT | Mod: 26

## 2022-01-06 PROCEDURE — 99285 EMERGENCY DEPT VISIT HI MDM: CPT

## 2022-01-06 PROCEDURE — 71045 X-RAY EXAM CHEST 1 VIEW: CPT | Mod: 26

## 2022-01-06 PROCEDURE — 99254 IP/OBS CNSLTJ NEW/EST MOD 60: CPT | Mod: 25

## 2022-01-06 PROCEDURE — 99223 1ST HOSP IP/OBS HIGH 75: CPT | Mod: GC

## 2022-01-06 PROCEDURE — 99291 CRITICAL CARE FIRST HOUR: CPT

## 2022-01-06 PROCEDURE — 99253 IP/OBS CNSLTJ NEW/EST LOW 45: CPT

## 2022-01-06 RX ORDER — ATROPINE SULFATE 0.1 MG/ML
0.5 SYRINGE (ML) INJECTION ONCE
Refills: 0 | Status: DISCONTINUED | OUTPATIENT
Start: 2022-01-06 | End: 2022-01-07

## 2022-01-06 RX ORDER — FERROUS SULFATE 325(65) MG
325 TABLET ORAL DAILY
Refills: 0 | Status: DISCONTINUED | OUTPATIENT
Start: 2022-01-06 | End: 2022-01-09

## 2022-01-06 RX ORDER — ATORVASTATIN CALCIUM 80 MG/1
10 TABLET, FILM COATED ORAL AT BEDTIME
Refills: 0 | Status: DISCONTINUED | OUTPATIENT
Start: 2022-01-06 | End: 2022-01-19

## 2022-01-06 RX ORDER — SODIUM CHLORIDE 9 MG/ML
1000 INJECTION, SOLUTION INTRAVENOUS
Refills: 0 | Status: DISCONTINUED | OUTPATIENT
Start: 2022-01-06 | End: 2022-01-19

## 2022-01-06 RX ORDER — DULOXETINE HYDROCHLORIDE 30 MG/1
30 CAPSULE, DELAYED RELEASE ORAL AT BEDTIME
Refills: 0 | Status: DISCONTINUED | OUTPATIENT
Start: 2022-01-06 | End: 2022-01-06

## 2022-01-06 RX ORDER — FLUMAZENIL 0.1 MG/ML
0.6 VIAL (ML) INTRAVENOUS ONCE
Refills: 0 | Status: COMPLETED | OUTPATIENT
Start: 2022-01-06 | End: 2022-01-06

## 2022-01-06 RX ORDER — SODIUM ZIRCONIUM CYCLOSILICATE 10 G/10G
10 POWDER, FOR SUSPENSION ORAL ONCE
Refills: 0 | Status: COMPLETED | OUTPATIENT
Start: 2022-01-06 | End: 2022-01-06

## 2022-01-06 RX ORDER — INSULIN HUMAN 100 [IU]/ML
10 INJECTION, SOLUTION SUBCUTANEOUS ONCE
Refills: 0 | Status: COMPLETED | OUTPATIENT
Start: 2022-01-06 | End: 2022-01-06

## 2022-01-06 RX ORDER — TRAZODONE HCL 50 MG
100 TABLET ORAL AT BEDTIME
Refills: 0 | Status: DISCONTINUED | OUTPATIENT
Start: 2022-01-06 | End: 2022-01-19

## 2022-01-06 RX ORDER — BACLOFEN 100 %
10 POWDER (GRAM) MISCELLANEOUS EVERY 6 HOURS
Refills: 0 | Status: DISCONTINUED | OUTPATIENT
Start: 2022-01-06 | End: 2022-01-06

## 2022-01-06 RX ORDER — DEXTROSE 50 % IN WATER 50 %
25 SYRINGE (ML) INTRAVENOUS ONCE
Refills: 0 | Status: DISCONTINUED | OUTPATIENT
Start: 2022-01-06 | End: 2022-01-19

## 2022-01-06 RX ORDER — CALCIUM GLUCONATE 100 MG/ML
2 VIAL (ML) INTRAVENOUS ONCE
Refills: 0 | Status: COMPLETED | OUTPATIENT
Start: 2022-01-06 | End: 2022-01-06

## 2022-01-06 RX ORDER — DOPAMINE HYDROCHLORIDE 40 MG/ML
2 INJECTION, SOLUTION, CONCENTRATE INTRAVENOUS
Qty: 400 | Refills: 0 | Status: DISCONTINUED | OUTPATIENT
Start: 2022-01-06 | End: 2022-01-07

## 2022-01-06 RX ORDER — LIDOCAINE HCL 20 MG/ML
15 VIAL (ML) INJECTION ONCE
Refills: 0 | Status: COMPLETED | OUTPATIENT
Start: 2022-01-06 | End: 2022-01-06

## 2022-01-06 RX ORDER — DEXTROSE 50 % IN WATER 50 %
25 SYRINGE (ML) INTRAVENOUS ONCE
Refills: 0 | Status: COMPLETED | OUTPATIENT
Start: 2022-01-06 | End: 2022-01-06

## 2022-01-06 RX ORDER — FUROSEMIDE 40 MG
100 TABLET ORAL ONCE
Refills: 0 | Status: COMPLETED | OUTPATIENT
Start: 2022-01-06 | End: 2022-01-06

## 2022-01-06 RX ORDER — DEXTROSE 50 % IN WATER 50 %
50 SYRINGE (ML) INTRAVENOUS ONCE
Refills: 0 | Status: COMPLETED | OUTPATIENT
Start: 2022-01-06 | End: 2022-01-06

## 2022-01-06 RX ORDER — DEXTROSE 50 % IN WATER 50 %
12.5 SYRINGE (ML) INTRAVENOUS ONCE
Refills: 0 | Status: DISCONTINUED | OUTPATIENT
Start: 2022-01-06 | End: 2022-01-19

## 2022-01-06 RX ORDER — CALCIUM CHLORIDE
2 POWDER (GRAM) MISCELLANEOUS ONCE
Refills: 0 | Status: COMPLETED | OUTPATIENT
Start: 2022-01-06 | End: 2022-01-06

## 2022-01-06 RX ORDER — ALBUTEROL 90 UG/1
2.5 AEROSOL, METERED ORAL
Refills: 0 | Status: COMPLETED | OUTPATIENT
Start: 2022-01-06 | End: 2022-01-06

## 2022-01-06 RX ORDER — SODIUM BICARBONATE 1 MEQ/ML
50 SYRINGE (ML) INTRAVENOUS ONCE
Refills: 0 | Status: COMPLETED | OUTPATIENT
Start: 2022-01-06 | End: 2022-01-06

## 2022-01-06 RX ORDER — GLUCAGON INJECTION, SOLUTION 0.5 MG/.1ML
1 INJECTION, SOLUTION SUBCUTANEOUS ONCE
Refills: 0 | Status: DISCONTINUED | OUTPATIENT
Start: 2022-01-06 | End: 2022-01-19

## 2022-01-06 RX ORDER — INSULIN LISPRO 100/ML
VIAL (ML) SUBCUTANEOUS
Refills: 0 | Status: DISCONTINUED | OUTPATIENT
Start: 2022-01-06 | End: 2022-01-19

## 2022-01-06 RX ORDER — LEVOTHYROXINE SODIUM 125 MCG
50 TABLET ORAL DAILY
Refills: 0 | Status: DISCONTINUED | OUTPATIENT
Start: 2022-01-06 | End: 2022-01-19

## 2022-01-06 RX ORDER — HEPARIN SODIUM 5000 [USP'U]/ML
5000 INJECTION INTRAVENOUS; SUBCUTANEOUS EVERY 8 HOURS
Refills: 0 | Status: DISCONTINUED | OUTPATIENT
Start: 2022-01-06 | End: 2022-01-19

## 2022-01-06 RX ORDER — SODIUM CHLORIDE 9 MG/ML
1000 INJECTION INTRAMUSCULAR; INTRAVENOUS; SUBCUTANEOUS ONCE
Refills: 0 | Status: COMPLETED | OUTPATIENT
Start: 2022-01-06 | End: 2022-01-06

## 2022-01-06 RX ORDER — CEFTRIAXONE 500 MG/1
2000 INJECTION, POWDER, FOR SOLUTION INTRAMUSCULAR; INTRAVENOUS EVERY 24 HOURS
Refills: 0 | Status: DISCONTINUED | OUTPATIENT
Start: 2022-01-07 | End: 2022-01-12

## 2022-01-06 RX ORDER — NOREPINEPHRINE BITARTRATE/D5W 8 MG/250ML
0.05 PLASTIC BAG, INJECTION (ML) INTRAVENOUS
Qty: 8 | Refills: 0 | Status: DISCONTINUED | OUTPATIENT
Start: 2022-01-06 | End: 2022-01-07

## 2022-01-06 RX ORDER — DEXTROSE 50 % IN WATER 50 %
15 SYRINGE (ML) INTRAVENOUS ONCE
Refills: 0 | Status: DISCONTINUED | OUTPATIENT
Start: 2022-01-06 | End: 2022-01-19

## 2022-01-06 RX ORDER — CEFTRIAXONE 500 MG/1
1000 INJECTION, POWDER, FOR SOLUTION INTRAMUSCULAR; INTRAVENOUS ONCE
Refills: 0 | Status: COMPLETED | OUTPATIENT
Start: 2022-01-06 | End: 2022-01-06

## 2022-01-06 RX ADMIN — SODIUM ZIRCONIUM CYCLOSILICATE 10 GRAM(S): 10 POWDER, FOR SUSPENSION ORAL at 13:58

## 2022-01-06 RX ADMIN — Medication 50 MILLIEQUIVALENT(S): at 12:50

## 2022-01-06 RX ADMIN — Medication 7.01 MICROGRAM(S)/KG/MIN: at 19:20

## 2022-01-06 RX ADMIN — Medication 100 MILLIGRAM(S): at 13:36

## 2022-01-06 RX ADMIN — HEPARIN SODIUM 5000 UNIT(S): 5000 INJECTION INTRAVENOUS; SUBCUTANEOUS at 22:19

## 2022-01-06 RX ADMIN — ALBUTEROL 2.5 MILLIGRAM(S): 90 AEROSOL, METERED ORAL at 00:42

## 2022-01-06 RX ADMIN — Medication 1: at 12:28

## 2022-01-06 RX ADMIN — Medication 2 MILLIGRAM(S): at 16:50

## 2022-01-06 RX ADMIN — SODIUM CHLORIDE 1000 MILLILITER(S): 9 INJECTION INTRAMUSCULAR; INTRAVENOUS; SUBCUTANEOUS at 00:41

## 2022-01-06 RX ADMIN — INSULIN HUMAN 10 UNIT(S): 100 INJECTION, SOLUTION SUBCUTANEOUS at 01:17

## 2022-01-06 RX ADMIN — Medication 25 MILLILITER(S): at 01:19

## 2022-01-06 RX ADMIN — Medication 1 MILLIGRAM(S): at 15:59

## 2022-01-06 RX ADMIN — Medication 25 MILLILITER(S): at 01:20

## 2022-01-06 RX ADMIN — Medication 15 MILLILITER(S): at 16:35

## 2022-01-06 RX ADMIN — HEPARIN SODIUM 5000 UNIT(S): 5000 INJECTION INTRAVENOUS; SUBCUTANEOUS at 13:02

## 2022-01-06 RX ADMIN — ALBUTEROL 2.5 MILLIGRAM(S): 90 AEROSOL, METERED ORAL at 01:04

## 2022-01-06 RX ADMIN — Medication 0: at 18:32

## 2022-01-06 RX ADMIN — CEFTRIAXONE 1000 MILLIGRAM(S): 500 INJECTION, POWDER, FOR SOLUTION INTRAMUSCULAR; INTRAVENOUS at 01:01

## 2022-01-06 RX ADMIN — Medication 50 MILLIEQUIVALENT(S): at 01:23

## 2022-01-06 RX ADMIN — Medication 200 GRAM(S): at 19:20

## 2022-01-06 RX ADMIN — INSULIN HUMAN 10 UNIT(S): 100 INJECTION, SOLUTION SUBCUTANEOUS at 13:20

## 2022-01-06 RX ADMIN — Medication 50 MILLILITER(S): at 00:05

## 2022-01-06 RX ADMIN — Medication 10 MILLIGRAM(S): at 06:07

## 2022-01-06 RX ADMIN — DOPAMINE HYDROCHLORIDE 5.61 MICROGRAM(S)/KG/MIN: 40 INJECTION, SOLUTION, CONCENTRATE INTRAVENOUS at 19:20

## 2022-01-06 RX ADMIN — INSULIN HUMAN 10 UNIT(S): 100 INJECTION, SOLUTION SUBCUTANEOUS at 00:04

## 2022-01-06 RX ADMIN — Medication 325 MILLIGRAM(S): at 12:18

## 2022-01-06 RX ADMIN — Medication 0.6 MILLIGRAM(S): at 16:46

## 2022-01-06 RX ADMIN — Medication 2: at 06:53

## 2022-01-06 RX ADMIN — ALBUTEROL 2.5 MILLIGRAM(S): 90 AEROSOL, METERED ORAL at 00:41

## 2022-01-06 RX ADMIN — HEPARIN SODIUM 5000 UNIT(S): 5000 INJECTION INTRAVENOUS; SUBCUTANEOUS at 06:07

## 2022-01-06 RX ADMIN — CEFTRIAXONE 100 MILLIGRAM(S): 500 INJECTION, POWDER, FOR SOLUTION INTRAMUSCULAR; INTRAVENOUS at 12:18

## 2022-01-06 RX ADMIN — Medication 2 GRAM(S): at 01:01

## 2022-01-06 RX ADMIN — Medication 50 MILLILITER(S): at 13:12

## 2022-01-06 RX ADMIN — Medication 1 MILLIGRAM(S): at 16:10

## 2022-01-06 RX ADMIN — Medication 200 GRAM(S): at 13:11

## 2022-01-06 RX ADMIN — CEFTRIAXONE 100 MILLIGRAM(S): 500 INJECTION, POWDER, FOR SOLUTION INTRAMUSCULAR; INTRAVENOUS at 00:05

## 2022-01-06 RX ADMIN — Medication 50 MICROGRAM(S): at 06:06

## 2022-01-06 NOTE — PROGRESS NOTE ADULT - ATTENDING COMMENTS
delay in starting HD due to bradycardia- improved with atropine- consider dopamine  seen and evaluated with ICU team  stabilized and initiated HD-  tolerating the HD procedure well  some hemodynamic instability may have been related to acid-base and electrolyte perturbations

## 2022-01-06 NOTE — PROGRESS NOTE ADULT - SUBJECTIVE AND OBJECTIVE BOX
***TRANSFER FROM [facility] to [facility]***    Hospital Course:    SUBJECTIVE / INTERVAL HPI: Patient seen and examined at bedside.     ROS: negative unless otherwise stated above.    VITAL SIGNS:  Vital Signs Last 24 Hrs  T(C): 35.8 (2022 18:18), Max: 36.7 (2022 04:26)  T(F): 96.4 (2022 18:18), Max: 98.1 (2022 04:26)  HR: 74 (2022 20:00) (35 - 77)  BP: 136/64 (2022 20:00) (81/53 - 143/65)  BP(mean): 92 (2022 20:00) (47 - 117)  RR: 8 (2022 20:00) (8 - 34)  SpO2: 100% (2022 20:00) (95% - 100%)      22 @ 07:01  -  22 @ 07:00  --------------------------------------------------------  IN: 50 mL / OUT: 110 mL / NET: -60 mL    22 @ 07:01  -  22 @ 20:10  --------------------------------------------------------  IN: 50.4 mL / OUT: 350 mL / NET: -299.6 mL        PHYSICAL EXAM:    General: NAD  HEENT: MMM  Neck: supple  Cardiovascular: +S1/S2; RRR  Respiratory: CTA B/L; no W/R/R  Gastrointestinal: soft, NT/ND  Extremities: WWP; no edema, clubbing or cyanosis  Vascular: 2+ radial, DP/PT pulses B/L  Neurological: AAOx3; no focal deficits    MEDICATIONS:  MEDICATIONS  (STANDING):  atorvastatin 10 milliGRAM(s) Oral at bedtime  atropine Injectable 0.5 milliGRAM(s) IV Push once  dextrose 40% Gel 15 Gram(s) Oral once  dextrose 5%. 1000 milliLiter(s) (50 mL/Hr) IV Continuous <Continuous>  dextrose 5%. 1000 milliLiter(s) (100 mL/Hr) IV Continuous <Continuous>  dextrose 50% Injectable 25 Gram(s) IV Push once  dextrose 50% Injectable 12.5 Gram(s) IV Push once  dextrose 50% Injectable 25 Gram(s) IV Push once  DOPamine Infusion 2 MICROgram(s)/kG/Min (5.61 mL/Hr) IV Continuous <Continuous>  ferrous    sulfate 325 milliGRAM(s) Oral daily  glucagon  Injectable 1 milliGRAM(s) IntraMuscular once  heparin   Injectable 5000 Unit(s) SubCutaneous every 8 hours  insulin lispro (ADMELOG) corrective regimen sliding scale   SubCutaneous Before meals and at bedtime  levothyroxine 50 MICROGram(s) Oral daily  norepinephrine Infusion 0.05 MICROgram(s)/kG/Min (7.01 mL/Hr) IV Continuous <Continuous>  traZODone 100 milliGRAM(s) Oral at bedtime    MEDICATIONS  (PRN):      ALLERGIES:  Allergies    No Known Allergies    Intolerances        LABS:                        9.6    7.41  )-----------( 199      ( 2022 18:33 )             29.6     01-06    121<L>  |  99  |  63<H>  ----------------------------<  146<H>  See Note   |  8<LL>  |  6.47<H>    Ca    10.0      2022 18:04  Phos  6.5     -  Mg     2.2     -    TPro  10.3<H>  /  Alb  4.2  /  TBili  0.4  /  DBili  x   /  AST  8<L>  /  ALT  12  /  AlkPhos  104  01-05      Urinalysis Basic - ( 2022 00:12 )    Color: Yellow / Appearance: Cloudy / S.020 / pH: x  Gluc: x / Ketone: NEGATIVE  / Bili: Small / Urobili: 0.2 E.U./dL   Blood: x / Protein: >=300 mg/dL / Nitrite: POSITIVE   Leuk Esterase: Large / RBC: x / WBC Many /HPF   Sq Epi: x / Non Sq Epi: x / Bacteria: Present /HPF      CAPILLARY BLOOD GLUCOSE      POCT Blood Glucose.: 134 mg/dL (2022 18:06)      RADIOLOGY & ADDITIONAL TESTS: Reviewed. ***TRANSFER FROM [facility] to [facility]***    Hospital Course:  Hospital Course:  60 y/o M HTN, DM complicated by neuropathy, EtOH liver cirrhosis, hx IVDU, toxic megacolon c/b pneumatosis s/p colectomy/ileostomy who presents with generalized weakness x 3 days found to have MEG, hyponatremia, hyperkalemia admitted for management and treatment. Repeat labs today showed that potassium was increasing again, up to 7.6, decision made for emergent dialysis. Patient altered and moving around, ativan 1mg x2 given. Line was placed but patient became michelle to 30s, rapid response was called as patient also not responsive. Gave patient flumazenil, calcium chloride (see rapid response notes for more details), and patient became alert, still disoriented. Decision made to step patient up to MICU for closer monitoring overnight      SUBJECTIVE / INTERVAL HPI: Patient seen and examined at bedside. unable to obtain ROS 2/2 to pt's mental status.     ROS: negative unless otherwise stated above.    VITAL SIGNS:  Vital Signs Last 24 Hrs  T(C): 35.8 (2022 18:18), Max: 36.7 (2022 04:26)  T(F): 96.4 (2022 18:18), Max: 98.1 (2022 04:26)  HR: 74 (2022 20:00) (35 - 77)  BP: 136/64 (2022 20:00) (81/53 - 143/65)  BP(mean): 92 (2022 20:00) (47 - 117)  RR: 8 (2022 20:00) (8 - 34)  SpO2: 100% (2022 20:00) (95% - 100%)      22 @ 07:01  -  22 @ 07:00  --------------------------------------------------------  IN: 50 mL / OUT: 110 mL / NET: -60 mL    22 @ 07:01  -  22 @ 20:10  --------------------------------------------------------  IN: 50.4 mL / OUT: 350 mL / NET: -299.6 mL        PHYSICAL EXAM:    General: NAD, lying in bed  HEENT: MMM  Neck: supple  Cardiovascular: +S1/S2, bradycardiac  Respiratory: CTA B/L; no W/R/R  Gastrointestinal: soft, NT/ND, ostomy in place  Extremities: WWP; no clubbing or cyanosis  Vascular: 2+ radial, DP/PT pulses B/L  Neurological: AAOx1-2    MEDICATIONS:  MEDICATIONS  (STANDING):  atorvastatin 10 milliGRAM(s) Oral at bedtime  atropine Injectable 0.5 milliGRAM(s) IV Push once  dextrose 40% Gel 15 Gram(s) Oral once  dextrose 5%. 1000 milliLiter(s) (50 mL/Hr) IV Continuous <Continuous>  dextrose 5%. 1000 milliLiter(s) (100 mL/Hr) IV Continuous <Continuous>  dextrose 50% Injectable 25 Gram(s) IV Push once  dextrose 50% Injectable 12.5 Gram(s) IV Push once  dextrose 50% Injectable 25 Gram(s) IV Push once  DOPamine Infusion 2 MICROgram(s)/kG/Min (5.61 mL/Hr) IV Continuous <Continuous>  ferrous    sulfate 325 milliGRAM(s) Oral daily  glucagon  Injectable 1 milliGRAM(s) IntraMuscular once  heparin   Injectable 5000 Unit(s) SubCutaneous every 8 hours  insulin lispro (ADMELOG) corrective regimen sliding scale   SubCutaneous Before meals and at bedtime  levothyroxine 50 MICROGram(s) Oral daily  norepinephrine Infusion 0.05 MICROgram(s)/kG/Min (7.01 mL/Hr) IV Continuous <Continuous>  traZODone 100 milliGRAM(s) Oral at bedtime    MEDICATIONS  (PRN):      ALLERGIES:  Allergies    No Known Allergies    Intolerances        LABS:                        9.6    7.41  )-----------( 199      ( 2022 18:33 )             29.6     01-06    121<L>  |  99  |  63<H>  ----------------------------<  146<H>  See Note   |  8<LL>  |  6.47<H>    Ca    10.0      2022 18:04  Phos  6.5     01-  Mg     2.2     -06    TPro  10.3<H>  /  Alb  4.2  /  TBili  0.4  /  DBili  x   /  AST  8<L>  /  ALT  12  /  AlkPhos  104  -      Urinalysis Basic - ( 2022 00:12 )    Color: Yellow / Appearance: Cloudy / S.020 / pH: x  Gluc: x / Ketone: NEGATIVE  / Bili: Small / Urobili: 0.2 E.U./dL   Blood: x / Protein: >=300 mg/dL / Nitrite: POSITIVE   Leuk Esterase: Large / RBC: x / WBC Many /HPF   Sq Epi: x / Non Sq Epi: x / Bacteria: Present /HPF      CAPILLARY BLOOD GLUCOSE      POCT Blood Glucose.: 134 mg/dL (2022 18:06)      RADIOLOGY & ADDITIONAL TESTS: Reviewed.

## 2022-01-06 NOTE — PROGRESS NOTE ADULT - PROBLEM SELECTOR PLAN 8
Admission BP 82/52 --> 120/60 after 2L NS. Home meds lisinopril, amlodipine  - formal med rec in AM  - hold anti-HTN i/s/o soft BPS  - reintroduce anti-HTN when appropriate.

## 2022-01-06 NOTE — H&P ADULT - NSHPREVIEWOFSYSTEMS_GEN_ALL_CORE
CONSTITUTIONAL: No fevers or chills (+) weakness  EYES/ENT: No visual changes;  No vertigo or throat pain   NECK: No pain or stiffness  RESPIRATORY: No cough, wheezing, hemoptysis; No shortness of breath  CARDIOVASCULAR: No chest pain or palpitations  GASTROINTESTINAL: No abdominal. No nausea, vomiting. No diarrhea or constipation. No melena.  GENITOURINARY: No dysuria, frequency or hematuria  NEUROLOGICAL: No numbness  SKIN: No itching, rashes Unable to obtain ROS due to patients mental status

## 2022-01-06 NOTE — CONSULT NOTE ADULT - ATTENDING COMMENTS
acute anuric MEG-etiology unclear at this time, but hyperkalemia and metabolic acidosis necessitate urgent HD  to arrange
Initial attending contact date  1/6/22    . See fellow note written above for details. I reviewed the fellow documentation. I have personally seen and examined this patient. I reviewed vitals, labs, medications, cardiac studies, and additional imaging. I agree with the above fellow's findings and plans as written above with the following additions/statements.    -Cardiology consulted for bradycardia  -Tele and EKGS reviewed: sinus bradyarrhythmia in setting of electrolyte derangements, no AV block  -Pt somnolent but arousable. denies fatigue, dizziness, SOB with compensatory hypertension  -Cont monitor on tele while awaiting HD

## 2022-01-06 NOTE — PROGRESS NOTE ADULT - SUBJECTIVE AND OBJECTIVE BOX
Patient was seen and evaluated on dialysis.     Dialyzer: Optiflux [id number]  QB: 200 mL/min  QD: 400 mL/min  K bath: 2K   Goal UF: 0.5L  Duration: 180 min    Patient is tolerating the procedure well. Remains on dopamine for now due to HR. Prescription adjusted to QB of 400mL/min, Qd to 500mL/min and to run on 2K bath the entire time.   Continue full hemodialysis treatment as prescribed.

## 2022-01-06 NOTE — PROCEDURE NOTE - ADDITIONAL PROCEDURE DETAILS
+Lung sliding on POCUS before and after procedure. Wire visualized in long and short axis of venous vessel prior to dilation. +Agitated saline visualized in RA in subxiphoid view.

## 2022-01-06 NOTE — H&P ADULT - NSHPLABSRESULTS_GEN_ALL_CORE
.  LABS:                         11.0   11.25 )-----------( 271      ( 2022 22:47 )             33.1         x   |  x   |  x   ----------------------------<  x   5.7<H>   |  x   |  x     Ca    9.7      2022 22:47    TPro  10.3<H>  /  Alb  4.2  /  TBili  0.4  /  DBili  x   /  AST  8<L>  /  ALT  12  /  AlkPhos  104        Urinalysis Basic - ( 2022 00:12 )    Color: Yellow / Appearance: Cloudy / S.020 / pH: x  Gluc: x / Ketone: NEGATIVE  / Bili: Small / Urobili: 0.2 E.U./dL   Blood: x / Protein: >=300 mg/dL / Nitrite: POSITIVE   Leuk Esterase: Large / RBC: x / WBC Many /HPF   Sq Epi: x / Non Sq Epi: x / Bacteria: Present /HPF            Lactate, Blood: 1.0 mmoL/L ( @ 22:47)      RADIOLOGY, EKG & ADDITIONAL TESTS: Reviewed.

## 2022-01-06 NOTE — PROGRESS NOTE ADULT - PROBLEM SELECTOR PLAN 4
Admission Na 119. S/p 3L NS in the ED. Admitted 6 months ago for same electrolyte abnormalities. Etiology unclear. DDx includes hypovolemic hyponatremia   Na of 124 now  - renal following, f/u recs  - f/u urine osms, na, creatinine, uric acid

## 2022-01-06 NOTE — PROGRESS NOTE ADULT - ATTENDING COMMENTS
New MEG with uremia and hyperkalemia causing EKG changes. Transfer to MICU for emergent HD. Will need dopamine gtt as bradycardia not responsive to atropine and cannot get HD done otherwise.

## 2022-01-06 NOTE — H&P ADULT - NSHPPHYSICALEXAM_GEN_ALL_CORE
.  VITAL SIGNS:  T(C): 36.7 (01-06-22 @ 04:26), Max: 36.7 (01-06-22 @ 04:26)  T(F): 98.1 (01-06-22 @ 04:26), Max: 98.1 (01-06-22 @ 04:26)  HR: 73 (01-06-22 @ 04:26) (53 - 77)  BP: 129/55 (01-06-22 @ 04:26) (82/52 - 143/65)  BP(mean): --  RR: 20 (01-06-22 @ 04:26) (16 - 20)  SpO2: 99% (01-06-22 @ 04:26) (95% - 100%)  Wt(kg): --    PHYSICAL EXAM:    Constitutional: elderly male resting comfortably in bed; NAD  Eyes: PERRL, EOMI, anicteric sclera  ENT: no nasal discharge; uvula midline, no oropharyngeal erythema or exudates; MMM  Neck: supple; no JVD or thyromegaly  Respiratory: CTA B/L; no W/R/R, no retractions  Cardiac: +S1/S2; RRR; no M/R/G; PMI non-displaced  Gastrointestinal: soft, NT/ND; no rebound or guarding; +BSx4  Extremities: WWP, no clubbing or cyanosis; no peripheral edema  Musculoskeletal: NROM x4; no joint swelling, tenderness or erythema  Vascular: 2+ radial, DP/PT pulses B/L  Dermatologic: skin warm, dry and intact; no rashes, wounds, or scars  Neurologic: AAOx3; CNII-XII grossly intact; no focal deficits .  VITAL SIGNS:  T(C): 36.7 (01-06-22 @ 04:26), Max: 36.7 (01-06-22 @ 04:26)  T(F): 98.1 (01-06-22 @ 04:26), Max: 98.1 (01-06-22 @ 04:26)  HR: 73 (01-06-22 @ 04:26) (53 - 77)  BP: 129/55 (01-06-22 @ 04:26) (82/52 - 143/65)  BP(mean): --  RR: 20 (01-06-22 @ 04:26) (16 - 20)  SpO2: 99% (01-06-22 @ 04:26) (95% - 100%)  Wt(kg): --    PHYSICAL EXAM:    Constitutional: elderly male resting comfortably in bed; NAD  Eyes: PERRL, EOMI, anicteric sclera  ENT: no nasal discharge; uvula midline, no oropharyngeal erythema or exudates; MMM  Neck: supple; no JVD or thyromegaly  Respiratory: CTA B/L; no W/R/R, no retractions  Cardiac: +S1/S2; RRR; no M/R/G; PMI non-displaced  Gastrointestinal: soft, NT/ND; no rebound or guarding; +BSx4  Extremities: WWP, no clubbing or cyanosis; no peripheral edema  Vascular: 2+ radial, DP/PT pulses B/L  Dermatologic: skin warm, dry and intact; no rashes, wounds, or scars  Neurologic: AAOx3; no focal deficits .  VITAL SIGNS:  T(C): 36.7 (01-06-22 @ 04:26), Max: 36.7 (01-06-22 @ 04:26)  T(F): 98.1 (01-06-22 @ 04:26), Max: 98.1 (01-06-22 @ 04:26)  HR: 73 (01-06-22 @ 04:26) (53 - 77)  BP: 129/55 (01-06-22 @ 04:26) (82/52 - 143/65)  BP(mean): --  RR: 20 (01-06-22 @ 04:26) (16 - 20)  SpO2: 99% (01-06-22 @ 04:26) (95% - 100%)  Wt(kg): --    PHYSICAL EXAM:    Constitutional: elderly male resting comfortably in bed; NAD  Eyes: PERRL, EOMI, anicteric sclera  ENT: no nasal discharge; uvula midline, no oropharyngeal erythema or exudates; MMM  Neck: supple; no JVD or thyromegaly  Respiratory: CTA B/L; no W/R/R,   Cardiac: +S1/S2; RRR; no M/R/G;   Gastrointestinal: soft, NT/ND; no rebound or guarding; +BSx4. Ostomy in place without signs of infectious, bag half full  Extremities: WWP, no clubbing or cyanosis; no peripheral edema  Vascular: 2+ radial, DP/PT pulses B/L  Dermatologic: skin warm, dry and intact; no rashes, wounds, or scars. Ulcer @ right foot, healed over, no signs of infection  Neurologic: AAOx1 no focal deficits

## 2022-01-06 NOTE — H&P ADULT - PROBLEM SELECTOR PLAN 4
Patient with colostomy bag s/p colectomy for pneumatosis in setting of toxic megacolon.   - continue to monitior Admission K 8.0 --> 5.7 s/p hyperkalemia cocktail in ED. EKG with peaked T waves, repeat showed improved. Admitted 6 months ago for same electrolyte abnormalities, TSH wnl, etiology unknown at that time. DDx include worsening kidney function  - f/u repeat EKG   - f/u repeat BMP  - hold home lisinopril  - lokelma 10 PO PRN if K > 5.5

## 2022-01-06 NOTE — H&P ADULT - PROBLEM SELECTOR PLAN 1
Admission Na 119 --> ** s/p 2L NS in the ED. Admitted 6 months ago for same electrolyte abnormalities. Etiology unclear. DDx includes hypovolemic hyponatremia   - BMP q4-q6 (goal Na 125-126 in 24hr, ~1/6 10pm)  - f/u U lytes, Uosm, serum osm Admission Na 119 --> ** s/p 2L NS in the ED. Admitted 6 months ago for same electrolyte abnormalities. Etiology unclear. DDx includes hypovolemic hyponatremia   - f/u repeat BMP on arrival  - if overcorrecting concsider D5W vs ddavp  - BMP q4-q6 (goal Na 125-126 in 24hr, ~1/6 10pm)  - f/u U lytes, Uosm, serum osm  - nephro consult in AM MEG: MEG vs new onset CKD. Admission Cr 5.95, baseline Cr 1.7-1.8. AMS, dark urine however appears euvolemic otherwise. s/p 3L NS.  - repeat BMP to trend Cr s/p 2L NS  - nephro consult AM  - f/u U lytes, Uosm, serum osm

## 2022-01-06 NOTE — H&P ADULT - ASSESSMENT
62 y/o M HTN, DM complicated by neuropathy, EtOH liver cirrhosis, hx IVDU, toxic megacolon c/b pneumatosis s/p colectomy/ileostomy who presents with generalized weakness x 3 days found to have MEG, hyponatremia, hyperkalemia admitted for management and treatment.

## 2022-01-06 NOTE — H&P ADULT - PROBLEM SELECTOR PLAN 7
Hx DM2. 07/2021 A1c 8.1%. Home meds Latesha Bustillos   - f/u A1c  - mISS Hx HLD. Home med atorvastatin 10mg qd  - c/w atorvastatin 10mg qd

## 2022-01-06 NOTE — H&P ADULT - PROBLEM SELECTOR PLAN 5
Admission BP 82/52 --> 120/60 after 2L NS. Home meds lisinopril, amlodipine  - formal med rec in AM  - hold anti-HTN i/s/o soft BPS  - reintroduce anti-HTN when appropriate Patient with colostomy bag s/p colectomy for pneumatosis in setting of toxic megacolon.   - continue to monitior

## 2022-01-06 NOTE — CONSULT NOTE ADULT - SUBJECTIVE AND OBJECTIVE BOX
Patient is a 61y old  Male who presents with a chief complaint of electrolyte abnormalities (2022 12:58)    HPI:  62yo M hx of ?etoh cirrhosis, HTN, DM2 with neuropathy, hx of toxic megacolon c/b pneumatosis s/p colectomy and ileostomy, admission 6 months ago for hyperkalemia and hyponatremia, presents with generalized weakness and lightheadedness.  Pt is poor historian- unable to get much information from him. Has had repeated admission presenting with MEG- usually ATNs which resolve. Discussed with pts contact Varsha Rosas [phone]- he was not aware if the pt had seen any kidney doctors. Knew his PCP name- however, provider was unable to contact him.     PAST MEDICAL & SURGICAL HISTORY:  DM (diabetes mellitus)    HTN (hypertension)    HLD (hyperlipidemia)    ETOH abuse    Anemia    History of colectomy    History of ileostomy        Allergies:  No Known Allergies      Home Medications:   atorvastatin 10 milliGRAM(s) Oral at bedtime  dextrose 40% Gel 15 Gram(s) Oral once  dextrose 5%. 1000 milliLiter(s) IV Continuous <Continuous>  dextrose 5%. 1000 milliLiter(s) IV Continuous <Continuous>  dextrose 50% Injectable 25 Gram(s) IV Push once  dextrose 50% Injectable 12.5 Gram(s) IV Push once  dextrose 50% Injectable 25 Gram(s) IV Push once  ferrous    sulfate 325 milliGRAM(s) Oral daily  glucagon  Injectable 1 milliGRAM(s) IntraMuscular once  heparin   Injectable 5000 Unit(s) SubCutaneous every 8 hours  insulin lispro (ADMELOG) corrective regimen sliding scale   SubCutaneous three times a day before meals  levothyroxine 50 MICROGram(s) Oral daily  sodium zirconium cyclosilicate 10 Gram(s) Oral once  traZODone 100 milliGRAM(s) Oral at bedtime      Hospital Medications:   MEDICATIONS  (STANDING):  atorvastatin 10 milliGRAM(s) Oral at bedtime  dextrose 40% Gel 15 Gram(s) Oral once  dextrose 5%. 1000 milliLiter(s) (50 mL/Hr) IV Continuous <Continuous>  dextrose 5%. 1000 milliLiter(s) (100 mL/Hr) IV Continuous <Continuous>  dextrose 50% Injectable 25 Gram(s) IV Push once  dextrose 50% Injectable 12.5 Gram(s) IV Push once  dextrose 50% Injectable 25 Gram(s) IV Push once  ferrous    sulfate 325 milliGRAM(s) Oral daily  glucagon  Injectable 1 milliGRAM(s) IntraMuscular once  heparin   Injectable 5000 Unit(s) SubCutaneous every 8 hours  insulin lispro (ADMELOG) corrective regimen sliding scale   SubCutaneous three times a day before meals  levothyroxine 50 MICROGram(s) Oral daily  sodium zirconium cyclosilicate 10 Gram(s) Oral once  traZODone 100 milliGRAM(s) Oral at bedtime      Family History:  FAMILY HISTORY:      ROS:  RESPIRATORY: No shortness of breath  CARDIOVASCULAR: No Chest pain  MUSCULOSKELETAL: No leg swelling  All other ROS negative    VITALS:  T(F): 96.1 (22 @ 13:37), Max: 98.1 (22 @ 04:26)  HR: 52 (22 @ 12:24)  BP: 139/65 (22 @ 12:24)  RR: 18 (22 @ 12:24)  SpO2: 100% (22 @ 12:24)  Wt(kg): --     @ 07:  -   @ 07:00  --------------------------------------------------------  IN: 50 mL / OUT: 110 mL / NET: -60 mL     07:  -   @ 13:42  --------------------------------------------------------  IN: 0 mL / OUT: 300 mL / NET: -300 mL      Height (cm): 167.6 ( 22:10)  Weight (kg): 74.8 (:10)  BMI (kg/m2): 26.6 (:10)  BSA (m2): 1.84 ( 22:10)  CAPILLARY BLOOD GLUCOSE      POCT Blood Glucose.: 165 mg/dL (2022 12:22)  POCT Blood Glucose.: 211 mg/dL (2022 06:43)  POCT Blood Glucose.: 292 mg/dL (2022 03:58)  POCT Blood Glucose.: 258 mg/dL (2022 01:09)  POCT Blood Glucose.: 340 mg/dL (2022 00:27)  POCT Blood Glucose.: 112 mg/dL (2022 22:18)      PHYSICAL EXAM:  GENERAL: Awake, confused, comfortable, supine in bed on RA  CHEST/LUNG: Bilateral clear breath sounds, no rales  HEART: Regular rate and rhythm, no murmur  ABDOMEN: Soft, nontender, non distended  : s/p soliz  EXTREMITIES: No pedal oedema  ACCESS: None     LABS:      124<L>  |  99  |  63<H>  ----------------------------<  174<H>  7.6<HH>   |  11<L>  |  6.52<H>    Ca    9.0      2022 11:49  Phos  7.1       Mg     2.2         TPro  10.3<H>  /  Alb  4.2  /  TBili  0.4  /  DBili      /  AST  8<L>  /  ALT  12  /  AlkPhos  104      Creatinine Trend: 6.52 <--, 5.95 <--                        11.1   12.05 )-----------( 216      ( 2022 06:32 )             33.4       Urine Studies:  Urinalysis Basic - ( 2022 00:12 )    Color: Yellow / Appearance: Cloudy / S.020 / pH:   Gluc:  / Ketone: NEGATIVE  / Bili: Small / Urobili: 0.2 E.U./dL   Blood:  / Protein: >=300 mg/dL / Nitrite: POSITIVE   Leuk Esterase: Large / RBC:  / WBC Many /HPF   Sq Epi:  / Non Sq Epi:  / Bacteria: Present /HPF      Osmolality, Random Urine: 304 mosm/kg ( @ 08:03)  Sodium, Random Urine: 106 mmol/L ( @ 06:12)  Potassium, Random Urine: 13 mmol/L ( @ 06:12)  Chloride, Random Urine: 77 mmol/L ( @ 06:12)        22 @ 07:  -  22 @ 07:00  --------------------------------------------------------  IN: 50 mL / OUT: 110 mL / NET: -60 mL    22 @ 07:01  -  22 @ 13:42  --------------------------------------------------------  IN: 0 mL / OUT: 300 mL / NET: -300 mL

## 2022-01-06 NOTE — PROGRESS NOTE ADULT - PROBLEM SELECTOR PLAN 6
patient with colostomy bag s/p colectomy for pneumatosis in setting of toxic megacolon;   - monitor output

## 2022-01-06 NOTE — PROVIDER CONTACT NOTE (CHANGE IN STATUS NOTIFICATION) - BACKGROUND
pt admitted for weakness, hyperkalemia and hypernatremia. Was in between sinus michelle, junctional, and first degree throughout the day. bradycardic to 38. last K= 7.6. Urgent dialysis ordered. needed access.

## 2022-01-06 NOTE — PROGRESS NOTE ADULT - ASSESSMENT
62 y/o M HTN, DM complicated by neuropathy, EtOH liver cirrhosis, hx IVDU, toxic megacolon c/b pneumatosis s/p colectomy/ileostomy who presents with generalized weakness x 3 days found to have MEG, hyponatremia, hyperkalemia admitted for management and treatment.    Neuro  #TME  - A&Ox1-2  - possibly 2/2 to renal failure with electrolyte derangements vs infectious cause UTI vs ativan side effect  - plan for emergent HD    Pulmonary  - no acute issues     Cardiovascular  #Bradycardia  - pt with episodes of bradycardia. Cardiology following and likely in setting of electrolyte derangements 2/2 to renal failure  - s/p atropine 0.5 mg x2   - started on dopamine gtt while receiving HD  - f/u TTE    #HTN  Hypertension.   - Home meds lisinopril, amlodipine  - hold anti-HTN i/s/o soft BPS  - reintroduce anti-HTN when appropriate.    #HLD  - c/w home atorvastatin 10 mg QD    Renal   #MEG on CKD  -  MEG vs new onset CKD. Admission Cr 5.95, baseline Cr 1.7-1.8. AMS, urine w/ gross pus, appears euvolemic otherwise. s/p 3L NS.   - unclear etiology, ATN vs paraproteinemia vs toxic ingestion?  Creat increasing to 6.52  - K on admission was 8.0  - emergent HD 1/6   - Minimal UOP  - renal following, appreciate recs  - monitor I&O  - avoid nephrotoxic agents  - hold home cymbalta and baclofen    #Hyponatremia  - Admission Na 119. S/p 3L NS in the ED. Admitted 6 months ago for same electrolyte abnormalities. Etiology unclear. DDx includes hypovolemic hyponatremia   - renal following, f/u recs  - f/u urine osms, na, creatinine, uric acid.    GI  #s/p colon resection with ostomy   - patient with colostomy bag s/p colectomy for pneumatosis in setting of toxic megacolon;   - monitor output.    #Alcoholic cirrhosis   - does not appear decompensated  - check ammonia level    ID  #UTI  Plan: UA grossly positive, patient c/o dysuria  - c/w ceftriaxone    Endo  #DM  Hx DM2. 07/2021 A1c 8.1%. Home meds Janumet, Levemir   - f/u A1c  - mISS.    Heme  #Anemia  - no evidence of bleeding. likely in setting of CKD  - check iron panel, b12 and folate      #Prophylaxis   F: none, HD  E: HD  N: diabetic diet  Ppx: hep subq    Code Status: full code    Dispo: [id number]

## 2022-01-06 NOTE — H&P ADULT - PROBLEM SELECTOR PLAN 9
F: s/p 2L NS   E: replete to K>4, Mg>2, Phos>2.5  N: diabetic diet  Ppx: hep subq    Code Status: full code    Dispo: [id number]

## 2022-01-06 NOTE — PROGRESS NOTE ADULT - ATTENDING COMMENTS
Acute renal failure with hyperkalemia, hyponatremia, anuria. Difficult to interpret UA for infection in this setting though WBC is slightly high. Will continue ceftriaxone pending a urine culture and the BCs but consider limiting it if no growth.  HD to start after catheter placement  Metabolic encephalopathy with uremia  Investigate methadone dose.  Decision making of high complexity

## 2022-01-06 NOTE — H&P ADULT - PROBLEM SELECTOR PLAN 2
Admission K 8.0 --> 5.7 s/p hyperkalemia cocktail in ED. EKG with peaked T waves, repeat showed improved. Admitted 6 months ago for same electrolyte abnormalities, TSH wnl, etiology unknown at that time. DDx include worsening kidney function  - f/u repeat EKG   - f/u repeat BMP  - hold home lisinopril  - lokelma 10 PO PRN if K > 5.5 Admission Na 119. S/p 3L NS in the ED. Admitted 6 months ago for same electrolyte abnormalities. Etiology unclear. DDx includes hypovolemic hyponatremia   - f/u repeat BMP on arrival  - if overcorrecting concsider D5W vs ddavp  - BMP q4-q6 (goal Na 125-126 in 24hr, ~1/6 10pm)  - f/u U lytes, Uosm, serum osm  - nephro consult in AM UA+ nitrite & bacteria, many WBCs. Unable to assess symptoms. S/p 1g CTX in the ED  - c/w CTX 1g

## 2022-01-06 NOTE — H&P ADULT - PROBLEM SELECTOR PLAN 3
MEG: MEG vs new onset CKD vs hepatorenal syndrome. Admission Cr 5.95, baseline Cr 1.7-1.8.   - repeat BMP to trend Cr s/p 2L NS  - nephro consult AM  - f/u U lytes, Uosm, serum osm Admission K 8.0 --> 5.7 s/p hyperkalemia cocktail in ED. EKG with peaked T waves, repeat showed improved. Admitted 6 months ago for same electrolyte abnormalities, TSH wnl, etiology unknown at that time. DDx include worsening kidney function  - f/u repeat EKG   - f/u repeat BMP  - hold home lisinopril  - lokelma 10 PO PRN if K > 5.5 Admission Na 119. S/p 3L NS in the ED. Admitted 6 months ago for same electrolyte abnormalities. Etiology unclear. DDx includes hypovolemic hyponatremia   - f/u repeat BMP on arrival  - if overcorrecting concsider D5W vs ddavp  - BMP q4-q6 (goal Na 125-126 in 24hr, ~1/6 10pm)  - f/u U lytes, Uosm, serum osm  - nephro consult in AM

## 2022-01-06 NOTE — ED ADULT NURSE REASSESSMENT NOTE - NS ED NURSE REASSESS COMMENT FT1
Received Pt from previous RN lying on stretcher asleep, breathing on NRM @10lpm, CCM in place, IV access are patent. Awaiting dispo. Received Pt from previous RN lying on stretcher asleep, breathing on RA and NAD, CCM in place, IV access are patent. Awaiting dispo.

## 2022-01-06 NOTE — CHART NOTE - NSCHARTNOTEFT_GEN_A_CORE
[facility] Team consulted for placement of HD catheter at bedside. Upon assessment of the patient, he was found to be restless and agitated when being spoken to about the need for central line placement. A 2PC consent was given by nephrology for hemodialysis.    The patient was ordered Ativan 1 mg IVP x 2 (spaced about 10 minutes apart). He tolerated the procedure well and hemodynamics remained within acceptable parameters.    After a couple of minutes post procedure, patient was noted to be minimally responsive to sternal rub. Concern for lack of Airway protection, therefore rapid response called. Dr. Pruett was on the floor and came immediately into the room.    Rescue breathing was provided as patient was bradypneic (6-7 bpm), satting 95%. Decision made to administer Flumazenil to reverse benzodiazepine which was given prior to line insertion. After administration of flumazenil, the patient's mental status started to improve. He did become slightly hypotensive during this episode and remained bradycardic [which he had been throughout the day]. Calcium chloride was administered IV push as patient was previously hyperkalemic and also to aid with the hypotension.    Upon discussion with Dr. Pruett and rest of primary team, writer recommended stepping patient up to MICU for closer monitoring of respiratory suppression and hypotension. The patient would benefit from ICU monitoring as he will be starting first hemodialysis session.     Patient transported to [facility].

## 2022-01-06 NOTE — CONSULT NOTE ADULT - SUBJECTIVE AND OBJECTIVE BOX
INCOMPLETE    HPI:  61 year old man, w/ a PMHx of cirrhosis 2/2 EtOH, HTN, DM2 with neuropathy, toxic megacolon (c/b pneumatosis s/p colectomy and ileostomy), recent admission 6 months ago for hyperkalemia and hyponatremia, p/w  generalized weakness and lightheadedness; found to be in ARF requiring HD. Cardiology consulted for bradycardia.    ED course  Vitals: 97.7F, HR 65, BP 82/52, RR 18 sat 95% RA  Labs: WBC 11, Hb 11.0, K 8.0, Na 119, Cl 90, HCO3 17, Cr 5.95, BUN 72, UA+ nitrite & bacteria, pH 7.10 pco2 46, po2 38  Imaging: CXR w/o consolidation or infiltrates, EKG peaked T waves, left axis deviation  Interventions: calcium gluconate 2g, regular insulin 10u x 3, 3 amp d50, NaHCO3 50meq, CTX 1g, albuterol neb x2, 1L NS bolus x2, lasix 40mg IVP x1 (06 Jan 2022 05:05)    ROS: A 10-point review of systems was otherwise negative.    PAST MEDICAL & SURGICAL HISTORY:  DM (diabetes mellitus)  HTN (hypertension)  HLD (hyperlipidemia)  ETOH abuse  Anemia  History of colectomy  History of ileostomy    SOCIAL HISTORY/FAMILY HISTORY:  none previously recorded    ALLERGIES: 	  No Known Allergies    Home Medications:  baclofen 10 mg oral tablet: 1 tab(s) orally every 6 hours (29 Jul 2021 19:30)  DULoxetine 30 mg oral delayed release capsule: 1  orally once a day (at bedtime) (29 Jul 2021 19:30)  Ferrex 150 Forte oral capsule: 1  orally once a day (29 Jul 2021 19:30)  ferrous sulfate 325 mg (65 mg elemental iron) oral tablet: 1  orally once a day (29 Jul 2021 19:30)  gabapentin 300 mg oral capsule: 4 cap(s) orally every 8 hours (29 Jul 2021 19:30)  Janumet 50 mg-500 mg oral tablet: 1 tab(s) orally 2 times a day (29 Jul 2021 19:30)  levothyroxine 50 mcg (0.05 mg) oral capsule: 1 cap(s) orally once a day (29 Jul 2021 19:30)  pravastatin 40 mg oral tablet: 1 tab(s) orally once a day (29 Jul 2021 19:30)    MEDICATIONS:  atorvastatin 10 milliGRAM(s) Oral at bedtime  calcium gluconate IVPB 2 Gram(s) IV Intermittent once  dextrose 40% Gel 15 Gram(s) Oral once  dextrose 5%. 1000 milliLiter(s) IV Continuous <Continuous>  dextrose 5%. 1000 milliLiter(s) IV Continuous <Continuous>  dextrose 50% Injectable 25 Gram(s) IV Push once  dextrose 50% Injectable 12.5 Gram(s) IV Push once  dextrose 50% Injectable 25 Gram(s) IV Push once  dextrose 50% Injectable 50 milliLiter(s) IV Push once  ferrous    sulfate 325 milliGRAM(s) Oral daily  furosemide   Injectable 100 milliGRAM(s) IV Push once  glucagon  Injectable 1 milliGRAM(s) IntraMuscular once  heparin   Injectable 5000 Unit(s) SubCutaneous every 8 hours  insulin lispro (ADMELOG) corrective regimen sliding scale   SubCutaneous three times a day before meals  insulin regular  human recombinant 10 Unit(s) IV Push once  levothyroxine 50 MICROGram(s) Oral daily  sodium zirconium cyclosilicate 10 Gram(s) Oral once  traZODone 100 milliGRAM(s) Oral at bedtime    PHYSICAL EXAM:  T(C): 35.4 (01-06-22 @ 10:09), Max: 36.7 (01-06-22 @ 04:26)  HR: 52 (01-06-22 @ 12:24) (39 - 77)  BP: 139/65 (01-06-22 @ 12:24) (82/52 - 143/65)  RR: 18 (01-06-22 @ 12:24) (16 - 20)  SpO2: 100% (01-06-22 @ 12:24) (95% - 100%)  Wt(kg): --    GEN: Awake, comfortable. NAD.   HEENT: NCAT, PERRL, EOMI. Mucosa moist. No JVD.   RESP: CTA b/l  CV: RRR, normal s1/s2. No m/r/g.  ABD: Soft, NTND. BS+  EXT: Warm. No edema, clubbing, or cyanosis.   NEURO: AAOx3. No focal deficits.    I&O's Summary    05 Jan 2022 07:01  -  06 Jan 2022 07:00  --------------------------------------------------------  IN: 50 mL / OUT: 110 mL / NET: -60 mL    06 Jan 2022 07:01  -  06 Jan 2022 12:59  --------------------------------------------------------  IN: 0 mL / OUT: 300 mL / NET: -300 mL    Height (cm): 167.6 (01-05 @ 22:10)  Weight (kg): 74.8 (01-05 @ 22:10)  BMI (kg/m2): 26.6 (01-05 @ 22:10)  BSA (m2): 1.84 (01-05 @ 22:10)  	  LABS:	 	             11.1   12.05 )-----------( 216      ( 06 Jan 2022 06:32 )             33.4     01-06    124<L>  |  99  |  63<H>  ----------------------------<  174<H>  7.6<HH>   |  11<L>  |  6.52<H>    Ca    9.0      06 Jan 2022 11:49  Phos  7.1     01-06  Mg     2.2     01-06    TPro  10.3<H>  /  Alb  4.2  /  TBili  0.4  /  DBili  x   /  AST  8<L>  /  ALT  12  /  AlkPhos  104  01-05    < from: TTE Echo Complete w/o contrast w/ Doppler (03.31.20 @ 10:55) >  1. No significant valvular disease.   2. The right ventricle is normal in size. Right ventricular systolic function is probably normal.   3. There is mild concentric left ventricular hypertrophy. The left ventricle is normal in size and systolic function with a calculated ejection fraction of 65-70%.   4. Small pericardial effusion.  < end of copied text >   HPI:  61 year old man, w/ a PMHx of cirrhosis 2/2 EtOH, HTN, DM2 with neuropathy, toxic megacolon (c/b pneumatosis s/p colectomy and ileostomy), recent admission 6 months ago for hyperkalemia and hyponatremia, p/w  generalized weakness and lightheadedness; found to be in ARF requiring HD. Cardiology consulted for bradycardia. Patient unable to give clear hx, but denies CP, SOB, palpitations, and dizziness.    ED course  Vitals: 97.7F, HR 65, BP 82/52, RR 18 sat 95% RA  Labs: WBC 11, Hb 11.0, K 8.0, Na 119, Cl 90, HCO3 17, Cr 5.95, BUN 72, UA+ nitrite & bacteria, pH 7.10 pco2 46, po2 38  Imaging: CXR w/o consolidation or infiltrates, EKG peaked T waves, left axis deviation  Interventions: calcium gluconate 2g, regular insulin 10u x 3, 3 amp d50, NaHCO3 50meq, CTX 1g, albuterol neb x2, 1L NS bolus x2, lasix 40mg IVP x1 (06 Jan 2022 05:05)    ROS: A 10-point review of systems was otherwise negative.    PAST MEDICAL & SURGICAL HISTORY:  DM (diabetes mellitus)  HTN (hypertension)  HLD (hyperlipidemia)  ETOH abuse  Anemia  History of colectomy  History of ileostomy    SOCIAL HISTORY/FAMILY HISTORY:  none previously recorded    ALLERGIES: 	  No Known Allergies    Home Medications:  baclofen 10 mg oral tablet: 1 tab(s) orally every 6 hours (29 Jul 2021 19:30)  DULoxetine 30 mg oral delayed release capsule: 1  orally once a day (at bedtime) (29 Jul 2021 19:30)  Ferrex 150 Forte oral capsule: 1  orally once a day (29 Jul 2021 19:30)  ferrous sulfate 325 mg (65 mg elemental iron) oral tablet: 1  orally once a day (29 Jul 2021 19:30)  gabapentin 300 mg oral capsule: 4 cap(s) orally every 8 hours (29 Jul 2021 19:30)  Janumet 50 mg-500 mg oral tablet: 1 tab(s) orally 2 times a day (29 Jul 2021 19:30)  levothyroxine 50 mcg (0.05 mg) oral capsule: 1 cap(s) orally once a day (29 Jul 2021 19:30)  pravastatin 40 mg oral tablet: 1 tab(s) orally once a day (29 Jul 2021 19:30)    MEDICATIONS:  atorvastatin 10 milliGRAM(s) Oral at bedtime  calcium gluconate IVPB 2 Gram(s) IV Intermittent once  dextrose 40% Gel 15 Gram(s) Oral once  dextrose 5%. 1000 milliLiter(s) IV Continuous <Continuous>  dextrose 5%. 1000 milliLiter(s) IV Continuous <Continuous>  dextrose 50% Injectable 25 Gram(s) IV Push once  dextrose 50% Injectable 12.5 Gram(s) IV Push once  dextrose 50% Injectable 25 Gram(s) IV Push once  dextrose 50% Injectable 50 milliLiter(s) IV Push once  ferrous    sulfate 325 milliGRAM(s) Oral daily  furosemide   Injectable 100 milliGRAM(s) IV Push once  glucagon  Injectable 1 milliGRAM(s) IntraMuscular once  heparin   Injectable 5000 Unit(s) SubCutaneous every 8 hours  insulin lispro (ADMELOG) corrective regimen sliding scale   SubCutaneous three times a day before meals  insulin regular  human recombinant 10 Unit(s) IV Push once  levothyroxine 50 MICROGram(s) Oral daily  sodium zirconium cyclosilicate 10 Gram(s) Oral once  traZODone 100 milliGRAM(s) Oral at bedtime    PHYSICAL EXAM:  T(C): 35.4 (01-06-22 @ 10:09), Max: 36.7 (01-06-22 @ 04:26)  HR: 52 (01-06-22 @ 12:24) (39 - 77)  BP: 139/65 (01-06-22 @ 12:24) (82/52 - 143/65)  RR: 18 (01-06-22 @ 12:24) (16 - 20)  SpO2: 100% (01-06-22 @ 12:24) (95% - 100%)  Wt(kg): --    GEN: Awake, comfortable. NAD.   HEENT: No JVD.   RESP: CTA b/l, though poor inspiratory effort.  CV: Regular, bradycardic, normal s1/s2. No m/r/g.  ABD: Soft, NTND. BS+  EXT: Warm. No edema, clubbing, or cyanosis. S/p toe amputations.    I&O's Summary    05 Jan 2022 07:01  -  06 Jan 2022 07:00  --------------------------------------------------------  IN: 50 mL / OUT: 110 mL / NET: -60 mL    06 Jan 2022 07:01  -  06 Jan 2022 12:59  --------------------------------------------------------  IN: 0 mL / OUT: 300 mL / NET: -300 mL    Height (cm): 167.6 (01-05 @ 22:10)  Weight (kg): 74.8 (01-05 @ 22:10)  BMI (kg/m2): 26.6 (01-05 @ 22:10)  BSA (m2): 1.84 (01-05 @ 22:10)  	  LABS:	 	             11.1   12.05 )-----------( 216      ( 06 Jan 2022 06:32 )             33.4     01-06    124<L>  |  99  |  63<H>  ----------------------------<  174<H>  7.6<HH>   |  11<L>  |  6.52<H>    Ca    9.0      06 Jan 2022 11:49  Phos  7.1     01-06  Mg     2.2     01-06    TPro  10.3<H>  /  Alb  4.2  /  TBili  0.4  /  DBili  x   /  AST  8<L>  /  ALT  12  /  AlkPhos  104  01-05    EKG 12:37 : Sinus bradycardia at 54 bpm, sinus arrhythmia, peaked T waves.    < from: TTE Echo Complete w/o contrast w/ Doppler (03.31.20 @ 10:55) >  1. No significant valvular disease.   2. The right ventricle is normal in size. Right ventricular systolic function is probably normal.   3. There is mild concentric left ventricular hypertrophy. The left ventricle is normal in size and systolic function with a calculated ejection fraction of 65-70%.   4. Small pericardial effusion.  < end of copied text >

## 2022-01-06 NOTE — PROGRESS NOTE ADULT - PROBLEM SELECTOR PLAN 2
MEG vs new onset CKD. Admission Cr 5.95, baseline Cr 1.7-1.8. AMS, urine w/ gross pus, appears euvolemic otherwise. s/p 3L NS. Unclear etiology  Creat increasing to 6.52  Minimal UOP  - renal following, appreciate recs  - f/u U lytes, Uosm, serum osm.  -monitor I&O  -avoid nephrotoxic agents  -hold home cymbalta and baclofen  -f/u SPEP/UPEP

## 2022-01-06 NOTE — PROGRESS NOTE ADULT - SUBJECTIVE AND OBJECTIVE BOX
INTERVAL HPI/OVERNIGHT EVENTS:  Patient was seen and examined at bedside. Patient still altered this morning, A&Ox2, has disorganized thoughts, difficult to complete ROS but does endorse dysuria    VITAL SIGNS:  T(F): 96.1 (22 @ 13:37)  HR: 52 (22 @ 12:24)  BP: 139/65 (22 @ 12:24)  RR: 18 (22 @ 12:24)  SpO2: 100% (22 @ 12:24)  Wt(kg): --    PHYSICAL EXAM:  Constitutional: NAD, A&Ox2, lip smacking  Eyes: PERRL, EOMI, anicteric sclera  ENT: no nasal discharge; uvula midline, no oropharyngeal erythema or exudates; MMM  Neck: supple; no JVD or thyromegaly  Respiratory: CTA B/L; no W/R/R,   Cardiac: +S1/S2; bradycardic to 40s, regular rhyth,; no M/R/G;   Gastrointestinal: soft, NT/ND; no rebound or guarding; +BSx4. Ostomy in place without signs of infectious, bag half full. no CVA tenderness  Extremities: WWP, R foot s/p amputation of 3-5th toes  Vascular: 2+ radial, DP/PT pulses B/L  Dermatologic: skin warm, dry and intact; no rashes, wounds, or scars. Ulcer @ right foot, healed over, no signs of infection  Neurologic: AAOx2 no focal deficits    MEDICATIONS  (STANDING):  atorvastatin 10 milliGRAM(s) Oral at bedtime  dextrose 40% Gel 15 Gram(s) Oral once  dextrose 5%. 1000 milliLiter(s) (50 mL/Hr) IV Continuous <Continuous>  dextrose 5%. 1000 milliLiter(s) (100 mL/Hr) IV Continuous <Continuous>  dextrose 50% Injectable 25 Gram(s) IV Push once  dextrose 50% Injectable 12.5 Gram(s) IV Push once  dextrose 50% Injectable 25 Gram(s) IV Push once  ferrous    sulfate 325 milliGRAM(s) Oral daily  glucagon  Injectable 1 milliGRAM(s) IntraMuscular once  heparin   Injectable 5000 Unit(s) SubCutaneous every 8 hours  insulin lispro (ADMELOG) corrective regimen sliding scale   SubCutaneous three times a day before meals  levothyroxine 50 MICROGram(s) Oral daily  traZODone 100 milliGRAM(s) Oral at bedtime    MEDICATIONS  (PRN):      Allergies    No Known Allergies    Intolerances        LABS:                        11.1   12.05 )-----------( 216      ( 2022 06:32 )             33.4     01-06    124<L>  |  99  |  63<H>  ----------------------------<  174<H>  7.6<HH>   |  11<L>  |  6.52<H>    Ca    9.0      2022 11:49  Phos  7.1     -  Mg     2.2     -    TPro  10.3<H>  /  Alb  4.2  /  TBili  0.4  /  DBili  x   /  AST  8<L>  /  ALT  12  /  AlkPhos  104        Urinalysis Basic - ( 2022 00:12 )    Color: Yellow / Appearance: Cloudy / S.020 / pH: x  Gluc: x / Ketone: NEGATIVE  / Bili: Small / Urobili: 0.2 E.U./dL   Blood: x / Protein: >=300 mg/dL / Nitrite: POSITIVE   Leuk Esterase: Large / RBC: x / WBC Many /HPF   Sq Epi: x / Non Sq Epi: x / Bacteria: Present /HPF        RADIOLOGY & ADDITIONAL TESTS:  Reviewed ~~stepped up to MICU from 7la~~  Hospital Course:  60 y/o M HTN, DM complicated by neuropathy, EtOH liver cirrhosis, hx IVDU, toxic megacolon c/b pneumatosis s/p colectomy/ileostomy who presents with generalized weakness x 3 days found to have MEG, hyponatremia, hyperkalemia admitted for management and treatment. Repeat labs today showed that potassium was increasing again, up to 7.6, decision made for emergent dialysis. Patient altered and moving around, ativan 1mg x2 given. Line was placed but patient became michelle to 30s, rapid response was called as patient also not responsive. Gave patient flumazenil, calcium chloride (see rapid response notes for more details), and patient became alert, still disoriented. Decision made to step patient up to MICU for closer monitoring overnight    INTERVAL HPI/OVERNIGHT EVENTS:  Patient was seen and examined at bedside. Patient still altered this morning, A&Ox2, has disorganized thoughts, difficult to complete ROS but does endorse dysuria    VITAL SIGNS:  T(F): 96.1 (22 @ 13:37)  HR: 52 (22 @ 12:24)  BP: 139/65 (22 @ 12:24)  RR: 18 (22 @ 12:24)  SpO2: 100% (22 @ 12:24)  Wt(kg): --    PHYSICAL EXAM:  Constitutional: NAD, A&Ox2, lip smacking  Eyes: PERRL, EOMI, anicteric sclera  ENT: no nasal discharge; uvula midline, no oropharyngeal erythema or exudates; MMM  Neck: supple; no JVD or thyromegaly  Respiratory: CTA B/L; no W/R/R,   Cardiac: +S1/S2; bradycardic to 40s, regular rhyth,; no M/R/G;   Gastrointestinal: soft, NT/ND; no rebound or guarding; +BSx4. Ostomy in place without signs of infectious, bag half full. no CVA tenderness  Extremities: WWP, R foot s/p amputation of 3-5th toes  Vascular: 2+ radial, DP/PT pulses B/L  Dermatologic: skin warm, dry and intact; no rashes, wounds, or scars. Ulcer @ right foot, healed over, no signs of infection  Neurologic: AAOx2 no focal deficits    MEDICATIONS  (STANDING):  atorvastatin 10 milliGRAM(s) Oral at bedtime  dextrose 40% Gel 15 Gram(s) Oral once  dextrose 5%. 1000 milliLiter(s) (50 mL/Hr) IV Continuous <Continuous>  dextrose 5%. 1000 milliLiter(s) (100 mL/Hr) IV Continuous <Continuous>  dextrose 50% Injectable 25 Gram(s) IV Push once  dextrose 50% Injectable 12.5 Gram(s) IV Push once  dextrose 50% Injectable 25 Gram(s) IV Push once  ferrous    sulfate 325 milliGRAM(s) Oral daily  glucagon  Injectable 1 milliGRAM(s) IntraMuscular once  heparin   Injectable 5000 Unit(s) SubCutaneous every 8 hours  insulin lispro (ADMELOG) corrective regimen sliding scale   SubCutaneous three times a day before meals  levothyroxine 50 MICROGram(s) Oral daily  traZODone 100 milliGRAM(s) Oral at bedtime    MEDICATIONS  (PRN):      Allergies    No Known Allergies    Intolerances        LABS:                        11.1   12. )-----------( 216      ( 2022 06:32 )             33.4     01-    124<L>  |  99  |  63<H>  ----------------------------<  174<H>  7.6<HH>   |  11<L>  |  6.52<H>    Ca    9.0      2022 11:49  Phos  7.1     -  Mg     2.2     -    TPro  10.3<H>  /  Alb  4.2  /  TBili  0.4  /  DBili  x   /  AST  8<L>  /  ALT  12  /  AlkPhos  104  -      Urinalysis Basic - ( 2022 00:12 )    Color: Yellow / Appearance: Cloudy / S.020 / pH: x  Gluc: x / Ketone: NEGATIVE  / Bili: Small / Urobili: 0.2 E.U./dL   Blood: x / Protein: >=300 mg/dL / Nitrite: POSITIVE   Leuk Esterase: Large / RBC: x / WBC Many /HPF   Sq Epi: x / Non Sq Epi: x / Bacteria: Present /HPF        RADIOLOGY & ADDITIONAL TESTS:  Reviewed

## 2022-01-06 NOTE — PROGRESS NOTE ADULT - PROBLEM SELECTOR PLAN 10
F: s/p 3L NS   E: replete to K>4, Mg>2, Phos>2.5  N: diabetic diet  Ppx: hep subq    Code Status: full code    Dispo: [facility].

## 2022-01-06 NOTE — PROGRESS NOTE ADULT - PROBLEM SELECTOR PLAN 1
Admission K 8.0 --> 5.7 s/p hyperkalemia cocktail in ED. EKG with peaked T waves, repeat showed improved. Admitted 6 months ago for same electrolyte abnormalities, TSH wnl, etiology unknown at that time. DDx include worsening kidney function  - K was as low as 5.7 overnight, back up to 7.6 this AM  ---give kyperK cocktail again along with lasix challenge 100mgIV  - holding home lisinopril due to hypokalemia  - AM cortisol levels  -repeat EKG

## 2022-01-06 NOTE — PROCEDURE NOTE - NSICDXPROCEDURE_GEN_ALL_CORE_FT
PROCEDURES:  Insertion of dialysis catheter with ultrasound guidance 06-Jan-2022 19:06:36  Tonja Navarrete

## 2022-01-06 NOTE — H&P ADULT - PROBLEM SELECTOR PLAN 8
F: s/p 2L NS   E: replete to K>4, Mg>2, Phos>2.5  N: diabetic diet  Ppx:    Code Status: full code    Dispo: [facility] F: s/p 2L NS   E: replete to K>4, Mg>2, Phos>2.5  N: diabetic diet  Ppx: hep subq    Code Status: full code    Dispo: [facility] Hx DM2. 07/2021 A1c 8.1%. Home meds Latesha Bustillos   - f/u A1c  - mISS

## 2022-01-06 NOTE — PROVIDER CONTACT NOTE (CHANGE IN STATUS NOTIFICATION) - SITUATION
pt Aox1. braycardic in low 40's before dialysis cathter placement. pt given 1 mg ativan for procedure and then 10 min later another 1mg. pt tolerated procedure ok until the end. pt became bradycardic to 35- SBP of 85 and unresponsive. Rapid response called. Clinical NP bedside. flumazenil given to reverse ativan and calcium given. pt became responsive but was apniec and remained michelle to 35. as per Dr. barron transfered to MICU.

## 2022-01-06 NOTE — H&P ADULT - ATTENDING COMMENTS
acute renal failure with hyponatremia, hypokalemia, anuria in methadone user with metabolic encephalopathy.  DC baclofen and duloxetine  Renal consult for likely HD  Difficult to interpret UA re infection in this setting. If no growth in urine or BC will consider DC ceftriaxone; WBC are slightly high.  Follow mental status after HD.  Decision making of high complexity.

## 2022-01-06 NOTE — PROCEDURE NOTE - NSANTIMICROB_VASC_A_CORE
Medical Necessity Clause: This procedure was medically necessary because the lesions that were treated were intensely: No Include Z78.9 (Other Specified Conditions Influencing Health Status) As An Associated Diagnosis?: No Detail Level: Detailed Post-Care Instructions: I reviewed with the patient in detail post-care instructions. Patient is to wear sunprotection, and avoid picking at any of the treated lesions. Pt may apply Vaseline to crusted or scabbing areas. Number Of Freeze-Thaw Cycles: 1 freeze-thaw cycle Medical Necessity Information: It is in your best interest to select a reason for this procedure from the list below. All of these items fulfill various CMS LCD requirements except the new and changing color options. Consent: The patient's consent was obtained including but not limited to risks of crusting, scabbing, blistering, scarring, darker or lighter pigmentary change, recurrence, incomplete removal and infection. Duration Of Freeze Thaw-Cycle (Seconds): 15 Detail Level: Simple

## 2022-01-06 NOTE — PROGRESS NOTE ADULT - PROBLEM SELECTOR PLAN 3
UA grossly positive, patient c/o dysuria  s/p ceftriaxone 1g  No CVA tenderness  -increase to ceftriaxone 2g  -bladder scan q6

## 2022-01-06 NOTE — H&P ADULT - HISTORY OF PRESENT ILLNESS
ED course  Vitals: 97.7F, HR 65, BP 82/52, RR 18 sat 95% RA  Labs: WBC 11, Hb 11.0, K 8.0, Na 119, Cl 90, HCO3 17, Cr 5.95, BUN 72, UA+ nitrite & bacteria  Imaging: CXR w/o consolidation or infiltrates, EKG peaked T waves, left axis deviation  Interventions: calcium gluconate 2g, regular insulin 10u x 3, 3 amp d50, NaHCO3 50meq, CTX 1g, albuterol neb x2, 1L NS bolus x2, lasix 40mg IVP x1 62yo M hx of etoh cirrhosis, HTN, DM2 w neuropathy, hx of toxic megacolon c/b pneumatosis s/p colectomy and ileostomy, admission 6 months ago for hyperkalemia and hyponatremia, presents with generalized weakness and lightheadedness.  Pt is poor historian.  Denies fever, chills, nausea, vomiting.  Denies cough, chest pain, or shortness of breath.      ED course  Vitals: 97.7F, HR 65, BP 82/52, RR 18 sat 95% RA  Labs: WBC 11, Hb 11.0, K 8.0, Na 119, Cl 90, HCO3 17, Cr 5.95, BUN 72, UA+ nitrite & bacteria  Imaging: CXR w/o consolidation or infiltrates, EKG peaked T waves, left axis deviation  Interventions: calcium gluconate 2g, regular insulin 10u x 3, 3 amp d50, NaHCO3 50meq, CTX 1g, albuterol neb x2, 1L NS bolus x2, lasix 40mg IVP x1 60yo M hx of etoh cirrhosis, HTN, DM2 w neuropathy, hx of toxic megacolon c/b pneumatosis s/p colectomy and ileostomy, admission 6 months ago for hyperkalemia and hyponatremia, presents with generalized weakness and lightheadedness.  Pt is poor historian.  Denies fever, chills, nausea, vomiting.  Denies cough, chest pain, or shortness of breath.      ED course  Vitals: 97.7F, HR 65, BP 82/52, RR 18 sat 95% RA  Labs: WBC 11, Hb 11.0, K 8.0, Na 119, Cl 90, HCO3 17, Cr 5.95, BUN 72, UA+ nitrite & bacteria, pH 7.10 pco2 46, po2 38  Imaging: CXR w/o consolidation or infiltrates, EKG peaked T waves, left axis deviation  Interventions: calcium gluconate 2g, regular insulin 10u x 3, 3 amp d50, NaHCO3 50meq, CTX 1g, albuterol neb x2, 1L NS bolus x2, lasix 40mg IVP x1 60yo M hx of ?etoh cirrhosis, HTN, DM2 with neuropathy, hx of toxic megacolon c/b pneumatosis s/p colectomy and ileostomy, admission 6 months ago for hyperkalemia and hyponatremia, presents with generalized weakness and lightheadedness.  Pt is poor historian. Unable to answer when last alcoholic drink was or if active drinker. Unable to obtain ROS.      ED course  Vitals: 97.7F, HR 65, BP 82/52, RR 18 sat 95% RA  Labs: WBC 11, Hb 11.0, K 8.0, Na 119, Cl 90, HCO3 17, Cr 5.95, BUN 72, UA+ nitrite & bacteria, pH 7.10 pco2 46, po2 38  Imaging: CXR w/o consolidation or infiltrates, EKG peaked T waves, left axis deviation  Interventions: calcium gluconate 2g, regular insulin 10u x 3, 3 amp d50, NaHCO3 50meq, CTX 1g, albuterol neb x2, 1L NS bolus x2, lasix 40mg IVP x1

## 2022-01-06 NOTE — H&P ADULT - PROBLEM SELECTOR PLAN 6
Hx HLD. Home med atorvastatin 10mg qd  - c/w atorvastatin 10mg qd Admission BP 82/52 --> 120/60 after 2L NS. Home meds lisinopril, amlodipine  - formal med rec in AM  - hold anti-HTN i/s/o soft BPS  - reintroduce anti-HTN when appropriate

## 2022-01-07 LAB
ALBUMIN SERPL ELPH-MCNC: 3.7 G/DL — SIGNIFICANT CHANGE UP (ref 3.3–5)
ALP SERPL-CCNC: 74 U/L — SIGNIFICANT CHANGE UP (ref 40–120)
ALT FLD-CCNC: <5 U/L — LOW (ref 10–45)
ANION GAP SERPL CALC-SCNC: 11 MMOL/L — SIGNIFICANT CHANGE UP (ref 5–17)
ANION GAP SERPL CALC-SCNC: 11 MMOL/L — SIGNIFICANT CHANGE UP (ref 5–17)
ANION GAP SERPL CALC-SCNC: 14 MMOL/L — SIGNIFICANT CHANGE UP (ref 5–17)
APTT BLD: 33.3 SEC — SIGNIFICANT CHANGE UP (ref 27.5–35.5)
AST SERPL-CCNC: 9 U/L — LOW (ref 10–40)
BASOPHILS # BLD AUTO: 0.03 K/UL — SIGNIFICANT CHANGE UP (ref 0–0.2)
BASOPHILS NFR BLD AUTO: 0.4 % — SIGNIFICANT CHANGE UP (ref 0–2)
BILIRUB SERPL-MCNC: <0.2 MG/DL — SIGNIFICANT CHANGE UP (ref 0.2–1.2)
BLD GP AB SCN SERPL QL: NEGATIVE — SIGNIFICANT CHANGE UP
BUN SERPL-MCNC: 16 MG/DL — SIGNIFICANT CHANGE UP (ref 7–23)
BUN SERPL-MCNC: 17 MG/DL — SIGNIFICANT CHANGE UP (ref 7–23)
BUN SERPL-MCNC: 32 MG/DL — HIGH (ref 7–23)
CALCIUM SERPL-MCNC: 8.9 MG/DL — SIGNIFICANT CHANGE UP (ref 8.4–10.5)
CALCIUM SERPL-MCNC: 9.1 MG/DL — SIGNIFICANT CHANGE UP (ref 8.4–10.5)
CALCIUM SERPL-MCNC: 9.5 MG/DL — SIGNIFICANT CHANGE UP (ref 8.4–10.5)
CHLORIDE SERPL-SCNC: 101 MMOL/L — SIGNIFICANT CHANGE UP (ref 96–108)
CHLORIDE SERPL-SCNC: 96 MMOL/L — SIGNIFICANT CHANGE UP (ref 96–108)
CHLORIDE SERPL-SCNC: 98 MMOL/L — SIGNIFICANT CHANGE UP (ref 96–108)
CO2 SERPL-SCNC: 21 MMOL/L — LOW (ref 22–31)
CO2 SERPL-SCNC: 23 MMOL/L — SIGNIFICANT CHANGE UP (ref 22–31)
CO2 SERPL-SCNC: 26 MMOL/L — SIGNIFICANT CHANGE UP (ref 22–31)
CORTIS AM PEAK SERPL-MCNC: 17.6 UG/DL — SIGNIFICANT CHANGE UP (ref 6.02–18.4)
CREAT SERPL-MCNC: 2.87 MG/DL — HIGH (ref 0.5–1.3)
CREAT SERPL-MCNC: 3.01 MG/DL — HIGH (ref 0.5–1.3)
CREAT SERPL-MCNC: 4.75 MG/DL — HIGH (ref 0.5–1.3)
EOSINOPHIL # BLD AUTO: 0.08 K/UL — SIGNIFICANT CHANGE UP (ref 0–0.5)
EOSINOPHIL NFR BLD AUTO: 1.2 % — SIGNIFICANT CHANGE UP (ref 0–6)
FERRITIN SERPL-MCNC: 367 NG/ML — SIGNIFICANT CHANGE UP (ref 30–400)
GLUCOSE BLDC GLUCOMTR-MCNC: 135 MG/DL — HIGH (ref 70–99)
GLUCOSE BLDC GLUCOMTR-MCNC: 135 MG/DL — HIGH (ref 70–99)
GLUCOSE BLDC GLUCOMTR-MCNC: 137 MG/DL — HIGH (ref 70–99)
GLUCOSE BLDC GLUCOMTR-MCNC: 233 MG/DL — HIGH (ref 70–99)
GLUCOSE SERPL-MCNC: 134 MG/DL — HIGH (ref 70–99)
GLUCOSE SERPL-MCNC: 209 MG/DL — HIGH (ref 70–99)
GLUCOSE SERPL-MCNC: 260 MG/DL — HIGH (ref 70–99)
HCT VFR BLD CALC: 26 % — LOW (ref 39–50)
HCT VFR BLD CALC: 30.9 % — LOW (ref 39–50)
HGB BLD-MCNC: 10.7 G/DL — LOW (ref 13–17)
HGB BLD-MCNC: 8.8 G/DL — LOW (ref 13–17)
IGA FLD-MCNC: 288 MG/DL — SIGNIFICANT CHANGE UP (ref 84–499)
IGG FLD-MCNC: 1983 MG/DL — HIGH (ref 610–1660)
IGM SERPL-MCNC: 40 MG/DL — SIGNIFICANT CHANGE UP (ref 35–242)
IMM GRANULOCYTES NFR BLD AUTO: 0.6 % — SIGNIFICANT CHANGE UP (ref 0–1.5)
INR BLD: 1.13 — SIGNIFICANT CHANGE UP (ref 0.88–1.16)
IRON SATN MFR SERPL: 22 % — SIGNIFICANT CHANGE UP (ref 16–55)
IRON SATN MFR SERPL: 44 UG/DL — LOW (ref 45–165)
KAPPA LC SER QL IFE: 19.81 MG/DL — HIGH (ref 0.33–1.94)
KAPPA/LAMBDA FREE LIGHT CHAIN RATIO, SERUM: 2.05 RATIO — HIGH (ref 0.26–1.65)
LAMBDA LC SER QL IFE: 9.68 MG/DL — HIGH (ref 0.57–2.63)
LYMPHOCYTES # BLD AUTO: 0.72 K/UL — LOW (ref 1–3.3)
LYMPHOCYTES # BLD AUTO: 10.8 % — LOW (ref 13–44)
MAGNESIUM SERPL-MCNC: 1.8 MG/DL — SIGNIFICANT CHANGE UP (ref 1.6–2.6)
MAGNESIUM SERPL-MCNC: 2.2 MG/DL — SIGNIFICANT CHANGE UP (ref 1.6–2.6)
MCHC RBC-ENTMCNC: 28.9 PG — SIGNIFICANT CHANGE UP (ref 27–34)
MCHC RBC-ENTMCNC: 29.7 PG — SIGNIFICANT CHANGE UP (ref 27–34)
MCHC RBC-ENTMCNC: 33.8 GM/DL — SIGNIFICANT CHANGE UP (ref 32–36)
MCHC RBC-ENTMCNC: 34.6 GM/DL — SIGNIFICANT CHANGE UP (ref 32–36)
MCV RBC AUTO: 85.5 FL — SIGNIFICANT CHANGE UP (ref 80–100)
MCV RBC AUTO: 85.8 FL — SIGNIFICANT CHANGE UP (ref 80–100)
MONOCYTES # BLD AUTO: 0.84 K/UL — SIGNIFICANT CHANGE UP (ref 0–0.9)
MONOCYTES NFR BLD AUTO: 12.6 % — SIGNIFICANT CHANGE UP (ref 2–14)
NEUTROPHILS # BLD AUTO: 4.96 K/UL — SIGNIFICANT CHANGE UP (ref 1.8–7.4)
NEUTROPHILS NFR BLD AUTO: 74.4 % — SIGNIFICANT CHANGE UP (ref 43–77)
NRBC # BLD: 0 /100 WBCS — SIGNIFICANT CHANGE UP (ref 0–0)
NRBC # BLD: 0 /100 WBCS — SIGNIFICANT CHANGE UP (ref 0–0)
PHOSPHATE SERPL-MCNC: 3.7 MG/DL — SIGNIFICANT CHANGE UP (ref 2.5–4.5)
PHOSPHATE SERPL-MCNC: 4.7 MG/DL — HIGH (ref 2.5–4.5)
PLATELET # BLD AUTO: 198 K/UL — SIGNIFICANT CHANGE UP (ref 150–400)
PLATELET # BLD AUTO: 223 K/UL — SIGNIFICANT CHANGE UP (ref 150–400)
POTASSIUM SERPL-MCNC: 4.3 MMOL/L — SIGNIFICANT CHANGE UP (ref 3.5–5.3)
POTASSIUM SERPL-MCNC: 4.5 MMOL/L — SIGNIFICANT CHANGE UP (ref 3.5–5.3)
POTASSIUM SERPL-MCNC: 5.8 MMOL/L — HIGH (ref 3.5–5.3)
POTASSIUM SERPL-SCNC: 4.3 MMOL/L — SIGNIFICANT CHANGE UP (ref 3.5–5.3)
POTASSIUM SERPL-SCNC: 4.5 MMOL/L — SIGNIFICANT CHANGE UP (ref 3.5–5.3)
POTASSIUM SERPL-SCNC: 5.8 MMOL/L — HIGH (ref 3.5–5.3)
PROT SERPL-MCNC: 7.4 G/DL — SIGNIFICANT CHANGE UP (ref 6–8.3)
PROT SERPL-MCNC: 7.4 G/DL — SIGNIFICANT CHANGE UP (ref 6–8.3)
PROT SERPL-MCNC: 7.5 G/DL — SIGNIFICANT CHANGE UP (ref 6–8.3)
PROTHROM AB SERPL-ACNC: 13.5 SEC — SIGNIFICANT CHANGE UP (ref 10.6–13.6)
RBC # BLD: 3.04 M/UL — LOW (ref 4.2–5.8)
RBC # BLD: 3.6 M/UL — LOW (ref 4.2–5.8)
RBC # FLD: 13.9 % — SIGNIFICANT CHANGE UP (ref 10.3–14.5)
RBC # FLD: 14.1 % — SIGNIFICANT CHANGE UP (ref 10.3–14.5)
RH IG SCN BLD-IMP: POSITIVE — SIGNIFICANT CHANGE UP
SODIUM SERPL-SCNC: 133 MMOL/L — LOW (ref 135–145)
SODIUM SERPL-SCNC: 133 MMOL/L — LOW (ref 135–145)
SODIUM SERPL-SCNC: 135 MMOL/L — SIGNIFICANT CHANGE UP (ref 135–145)
TIBC SERPL-MCNC: 200 UG/DL — LOW (ref 220–430)
TROPONIN T SERPL-MCNC: 0.04 NG/ML — CRITICAL HIGH (ref 0–0.01)
UIBC SERPL-MCNC: 156 UG/DL — SIGNIFICANT CHANGE UP (ref 110–370)
WBC # BLD: 6.67 K/UL — SIGNIFICANT CHANGE UP (ref 3.8–10.5)
WBC # BLD: 7.72 K/UL — SIGNIFICANT CHANGE UP (ref 3.8–10.5)
WBC # FLD AUTO: 6.67 K/UL — SIGNIFICANT CHANGE UP (ref 3.8–10.5)
WBC # FLD AUTO: 7.72 K/UL — SIGNIFICANT CHANGE UP (ref 3.8–10.5)

## 2022-01-07 PROCEDURE — 99233 SBSQ HOSP IP/OBS HIGH 50: CPT | Mod: GC

## 2022-01-07 PROCEDURE — 93306 TTE W/DOPPLER COMPLETE: CPT | Mod: 26

## 2022-01-07 PROCEDURE — 99233 SBSQ HOSP IP/OBS HIGH 50: CPT

## 2022-01-07 PROCEDURE — 90935 HEMODIALYSIS ONE EVALUATION: CPT

## 2022-01-07 PROCEDURE — 71045 X-RAY EXAM CHEST 1 VIEW: CPT | Mod: 26

## 2022-01-07 RX ORDER — SODIUM ZIRCONIUM CYCLOSILICATE 10 G/10G
10 POWDER, FOR SUSPENSION ORAL ONCE
Refills: 0 | Status: COMPLETED | OUTPATIENT
Start: 2022-01-07 | End: 2022-01-07

## 2022-01-07 RX ORDER — HYDRALAZINE HCL 50 MG
10 TABLET ORAL ONCE
Refills: 0 | Status: COMPLETED | OUTPATIENT
Start: 2022-01-07 | End: 2022-01-07

## 2022-01-07 RX ORDER — QUETIAPINE FUMARATE 200 MG/1
25 TABLET, FILM COATED ORAL AT BEDTIME
Refills: 0 | Status: DISCONTINUED | OUTPATIENT
Start: 2022-01-07 | End: 2022-01-19

## 2022-01-07 RX ORDER — FUROSEMIDE 40 MG
80 TABLET ORAL ONCE
Refills: 0 | Status: COMPLETED | OUTPATIENT
Start: 2022-01-07 | End: 2022-01-07

## 2022-01-07 RX ORDER — SODIUM ZIRCONIUM CYCLOSILICATE 10 G/10G
10 POWDER, FOR SUSPENSION ORAL ONCE
Refills: 0 | Status: DISCONTINUED | OUTPATIENT
Start: 2022-01-07 | End: 2022-01-07

## 2022-01-07 RX ADMIN — Medication 10 MILLIGRAM(S): at 21:00

## 2022-01-07 RX ADMIN — Medication 50 MICROGRAM(S): at 06:05

## 2022-01-07 RX ADMIN — HEPARIN SODIUM 5000 UNIT(S): 5000 INJECTION INTRAVENOUS; SUBCUTANEOUS at 15:28

## 2022-01-07 RX ADMIN — CEFTRIAXONE 100 MILLIGRAM(S): 500 INJECTION, POWDER, FOR SOLUTION INTRAMUSCULAR; INTRAVENOUS at 15:28

## 2022-01-07 RX ADMIN — Medication 325 MILLIGRAM(S): at 15:28

## 2022-01-07 RX ADMIN — Medication 80 MILLIGRAM(S): at 21:16

## 2022-01-07 RX ADMIN — SODIUM ZIRCONIUM CYCLOSILICATE 10 GRAM(S): 10 POWDER, FOR SUSPENSION ORAL at 09:14

## 2022-01-07 RX ADMIN — Medication 2: at 22:11

## 2022-01-07 RX ADMIN — HEPARIN SODIUM 5000 UNIT(S): 5000 INJECTION INTRAVENOUS; SUBCUTANEOUS at 06:05

## 2022-01-07 NOTE — DIETITIAN INITIAL EVALUATION ADULT. - PROBLEM SELECTOR PLAN 1
MEG: MEG vs new onset CKD. Admission Cr 5.95, baseline Cr 1.7-1.8. AMS, dark urine however appears euvolemic otherwise. s/p 3L NS.  - repeat BMP to trend Cr s/p 2L NS  - nephro consult AM  - f/u U lytes, Uosm, serum osm

## 2022-01-07 NOTE — PROGRESS NOTE ADULT - ASSESSMENT
62 y/o M HTN, DM complicated by neuropathy, EtOH liver cirrhosis, hx IVDU, toxic megacolon c/b pneumatosis s/p colectomy/ileostomy who presents with generalized weakness x 3 days found to have MEG, hyponatremia, hyperkalemia admitted for management and treatment.    Neuro  #AMS  Mental status significantly improved today after dialysis. Likely 2/2 uremia, UTI could be contributing    Pulmonary  - no acute issues     Cardiovascular  #Bradycardia  - pt with episodes of bradycardia. Cardiology following and likely in setting of electrolyte derangements 2/2 to renal failure  - s/p atropine 0.5 mg x2   - started on dopamine gtt while receiving HD, now off pressors  -HR more stable today, mostly 70-80s  - f/u TTE    #HTN  Hypertension.   - Home meds lisinopril, amlodipine  - hold anti-HTN i/s/o soft BPS  - reintroduce anti-HTN when appropriate.    #HLD  - c/w home atorvastatin 10 mg QD    Renal   #MEG on CKD  -  MEG vs new onset CKD. Admission Cr 5.95, baseline Cr 1.7-1.8. AMS, urine w/ gross pus, appears euvolemic otherwise. s/p 3L NS.   - unclear etiology, ATN vs paraproteinemia vs toxic ingestion?  Creat as high as 6.52. down to 3.66 last night after dialysis, today back up to 4.75  -s/p emergent dialysis on 1/6  -dialysis again today  - K on admission was 8.0  - renal following, appreciate recs  - monitor I&O  - avoid nephrotoxic agents  - hold home cymbalta and baclofen    #hyperkalemia  Potassium as high as 8, w/ peaked T waves, was labile yesterday, patient underwent emergent dialysis  Today potassium 5.8  -dialysis again today  -trend BMP      #Hyponatremia  Admission Na 119. S/p 3L NS in the ED. Admitted 6 months ago for same electrolyte abnormalities. Etiology unclear. DDx includes hypovolemic hyponatremia   Na of 133 today  - renal following, f/u recs      GI  #s/p colon resection with ostomy   - patient with colostomy bag s/p colectomy for pneumatosis in setting of toxic megacolon;   - monitor output.    #Alcoholic cirrhosis   - does not appear decompensated  - check ammonia level    ID  #UTI  Plan: UA grossly positive, patient c/o dysuria, UCx growing klebsiella  -f/u sensitivities  - c/w ceftriaxone 2g    Endo  #DM  Hx DM2. 07/2021 A1c 8.1%. Home meds Latesha Bustillos   - f/u A1c  - mISS.    Heme  #Anemia  - no evidence of bleeding. likely in setting of CKD  - check iron panel, b12 and folate      #Prophylaxis   F: none, HD  E: HD  N: diabetic diet  Ppx: hep subq    Code Status: full code    Dispo: MICU

## 2022-01-07 NOTE — PROGRESS NOTE ADULT - ASSESSMENT
61 year old man, w/ a PMHx of cirrhosis 2/2 EtOH, HTN, DM2 with neuropathy, toxic megacolon (c/b pneumatosis s/p colectomy and ileostomy), recent admission 6 months ago for hyperkalemia and hyponatremia, p/w  generalized weakness and lightheadedness; found to be in ARF requiring HD. Cardiology consulted for bradycardia.     Sinus Bradycardia  Middle aged man, w/ a hx of cirrhosis, HTN, and DM2 w/ neuropathy, now w/ sinus bradycardia and sinus arrhythmia in the setting of ARF and gross electrolyte abnormalities.  Patient unable to give clear hx, but denies CP, SOB, palpitations, and dizziness. Patient euvolemic on exam. Labs w/ gross electrolyte abnormalities (as above)EKG w/ sinus bradycardia at 54 bpm, sinus arrhythmia, and peaked T waves. TTE w/in the last year w/ preserved systolic function. Therefore, bradycardia 2/2 electrolyte disturbances.  - patient now on dopamine gtt and norepinephrine gtt  - electrolytes correction as per primary service and Nephrology   - hold AV gila blocking agents  - obtain daily EKG   - continue telemetry monitoring     Attending attestation to follow. 61 year old man, w/ a PMHx of cirrhosis 2/2 EtOH, HTN, DM2 with neuropathy, toxic megacolon (c/b pneumatosis s/p colectomy and ileostomy), recent admission 6 months ago for hyperkalemia and hyponatremia, p/w  generalized weakness and lightheadedness; found to be in ARF requiring HD. Cardiology consulted for bradycardia.     Sinus Bradycardia  Middle aged man, w/ a hx of cirrhosis, HTN, and DM2 w/ neuropathy, now w/ sinus bradycardia and sinus arrhythmia in the setting of ARF and gross electrolyte abnormalities.  Patient unable to give clear hx, but denies CP, SOB, palpitations, and dizziness. Patient euvolemic on exam. Labs w/ gross electrolyte abnormalities (as above)EKG w/ sinus bradycardia at 54 bpm, sinus arrhythmia, and peaked T waves. TTE w/in the last year w/ preserved systolic function. Therefore, bradycardia 2/2 electrolyte disturbances.  - patient now s/p HD and off dopamine gtt   - electrolytes correction as per primary service and Nephrology   - hold AV gila blocking agents  - obtain daily EKG   - continue telemetry monitoring     Attending attestation to follow. 61 year old man, w/ a PMHx of cirrhosis 2/2 EtOH, HTN, DM2 with neuropathy, toxic megacolon (c/b pneumatosis s/p colectomy and ileostomy), recent admission 6 months ago for hyperkalemia and hyponatremia, p/w  generalized weakness and lightheadedness; found to be in ARF requiring HD. Cardiology consulted for bradycardia.     Sinus Bradycardia  Middle aged man, w/ a hx of cirrhosis, HTN, and DM2 w/ neuropathy, now w/ sinus bradycardia and sinus arrhythmia in the setting of ARF and gross electrolyte abnormalities.  Patient unable to give clear hx, but denies CP, SOB, palpitations, and dizziness. Patient euvolemic on exam. Labs w/ gross electrolyte abnormalities (as above)EKG w/ sinus bradycardia at 54 bpm, sinus arrhythmia, and peaked T waves. TTE w/in the last year w/ preserved systolic function. Therefore, bradycardia 2/2 electrolyte disturbances. HR improved s/p HD w/ normalization of electrolytes.  - patient now s/p HD and off dopamine gtt   - electrolytes correction as per primary service and Nephrology   - hold AV gila blocking agents  - obtain daily EKG   - continue telemetry monitoring     Attending attestation to follow. Please reconsult as necessary.

## 2022-01-07 NOTE — PROGRESS NOTE ADULT - ATTENDING COMMENTS
ATN with acute on chronic renal failure requiring emergent HD for hyperkalemia now improved. Transeint AMS from ativan given for agitation during HD catheter placement last night reversed with flumazenil and has not recurred. Na improving.  Decisions of high complexity rendered.

## 2022-01-07 NOTE — PROGRESS NOTE ADULT - SUBJECTIVE AND OBJECTIVE BOX
Patient is a 61y Male seen and evaluated at bedside. Mentation vastly improved. Pt denied antifreeze use or drugs- doubt mental status changes from uraemia- likely TME from unknown toxic metabolite that improved after dialysing. Rebound hyperkalaemia- IHD today.     Meds:    atorvastatin 10 at bedtime  cefTRIAXone   IVPB 2000 every 24 hours  dextrose 40% Gel 15 once  dextrose 5%. 1000 <Continuous>  dextrose 5%. 1000 <Continuous>  dextrose 50% Injectable 25 once  dextrose 50% Injectable 12.5 once  dextrose 50% Injectable 25 once  ferrous    sulfate 325 daily  glucagon  Injectable 1 once  heparin   Injectable 5000 every 8 hours  insulin lispro (ADMELOG) corrective regimen sliding scale  Before meals and at bedtime  levothyroxine 50 daily  traZODone 100 at bedtime      T(C): , Max: 37.1 (22 @ 01:30)  T(F): , Max: 98.7 (22 @ 01:30)  HR: 75 (22 @ 10:45)  BP: 128/60 (22 @ 10:45)  BP(mean): 86 (22 @ 10:37)  RR: 18 (22 @ 10:45)  SpO2: 98% (22 @ 10:45)  Wt(kg): --     07:  -   @ 07:00  --------------------------------------------------------  IN: 567.2 mL / OUT: 890 mL / NET: -322.8 mL     @ 07:  -   @ 12:10  --------------------------------------------------------  IN: 240 mL / OUT: 200 mL / NET: 40 mL          Review of Systems:  RESPIRATORY: No shortness of breath  CARDIOVASCULAR: No chest pain   MUSCULOSKELETAL: No leg oedema    PHYSICAL EXAM:  GENERAL: Awake, alert, communicative on RA  CHEST/LUNG: Bilateral clear breath sounds, no rales  HEART: Regular rate and rhythm, no murmur  ABDOMEN: Soft, nontender, non distended  : s/p soliz  EXTREMITIES: No pedal oedema  ACCESS: LIJ temp HD cath    LABS:                        8.8    6.67  )-----------( 198      ( 2022 06:37 )             26.0         133<L>  |  101  |  32<H>  ----------------------------<  134<H>  5.8<H>   |  21<L>  |  4.75<H>    Ca    9.5      2022 06:37  Phos  4.7       Mg     2.2         TPro  7.5  /  Alb  3.7  /  TBili  <0.2  /  DBili  x   /  AST  9<L>  /  ALT  <5<L>  /  AlkPhos  74      Onley/Lambda Free Light Chain Ratio, Serum: 2.05 Ratio *H* [0.26 - 1.65] ( @ 00:49)  Hepatitis B Surface Antibody: Nonreact ( @ 18:33)    PT/INR - ( 2022 06:37 )   PT: 13.5 sec;   INR: 1.13          PTT - ( 2022 06:37 )  PTT:33.3 sec  Urinalysis Basic - ( 2022 00:12 )    Color: Yellow / Appearance: Cloudy / S.020 / pH: x  Gluc: x / Ketone: NEGATIVE  / Bili: Small / Urobili: 0.2 E.U./dL   Blood: x / Protein: >=300 mg/dL / Nitrite: POSITIVE   Leuk Esterase: Large / RBC: x / WBC Many /HPF   Sq Epi: x / Non Sq Epi: x / Bacteria: Present /HPF      Osmolality, Random Urine: 304 mosm/kg ( @ 08:03)  Sodium, Random Urine: 106 mmol/L ( @ 06:12)  Potassium, Random Urine: 13 mmol/L ( @ 06:12)  Chloride, Random Urine: 77 mmol/L ( @ 06:12)        RADIOLOGY & ADDITIONAL STUDIES:

## 2022-01-07 NOTE — PROGRESS NOTE ADULT - SUBJECTIVE AND OBJECTIVE BOX
INCOMPLETE    INTERVAL EVENTS:    PAST MEDICAL & SURGICAL HISTORY:  DM (diabetes mellitus)  HTN (hypertension)  HLD (hyperlipidemia)  ETOH abuse  Anemia  History of colectomy  History of ileostomy    Home Medications:  baclofen 10 mg oral tablet: 1 tab(s) orally every 6 hours (2021 19:30)  DULoxetine 30 mg oral delayed release capsule: 1  orally once a day (at bedtime) (2021 19:30)  Ferrex 150 Forte oral capsule: 1  orally once a day (2021 19:30)  ferrous sulfate 325 mg (65 mg elemental iron) oral tablet: 1  orally once a day (2021 19:30)  gabapentin 300 mg oral capsule: 4 cap(s) orally every 8 hours (2021 19:30)  Janumet 50 mg-500 mg oral tablet: 1 tab(s) orally 2 times a day (2021 19:30)  levothyroxine 50 mcg (0.05 mg) oral capsule: 1 cap(s) orally once a day (2021 19:30)  pravastatin 40 mg oral tablet: 1 tab(s) orally once a day (2021 19:30)    MEDICATIONS  (STANDING):  atorvastatin 10 milliGRAM(s) Oral at bedtime  atropine Injectable 0.5 milliGRAM(s) IV Push once  cefTRIAXone   IVPB 2000 milliGRAM(s) IV Intermittent every 24 hours  dextrose 40% Gel 15 Gram(s) Oral once  dextrose 5%. 1000 milliLiter(s) (50 mL/Hr) IV Continuous <Continuous>  dextrose 5%. 1000 milliLiter(s) (100 mL/Hr) IV Continuous <Continuous>  dextrose 50% Injectable 25 Gram(s) IV Push once  dextrose 50% Injectable 12.5 Gram(s) IV Push once  dextrose 50% Injectable 25 Gram(s) IV Push once  DOPamine Infusion 2 MICROgram(s)/kG/Min (5.61 mL/Hr) IV Continuous <Continuous>  ferrous    sulfate 325 milliGRAM(s) Oral daily  glucagon  Injectable 1 milliGRAM(s) IntraMuscular once  heparin   Injectable 5000 Unit(s) SubCutaneous every 8 hours  insulin lispro (ADMELOG) corrective regimen sliding scale   SubCutaneous Before meals and at bedtime  levothyroxine 50 MICROGram(s) Oral daily  norepinephrine Infusion 0.05 MICROgram(s)/kG/Min (7.01 mL/Hr) IV Continuous <Continuous>  traZODone 100 milliGRAM(s) Oral at bedtime    MEDICATIONS  (PRN):    Vital Signs Last 24 Hrs  T(C): 35.9 (2022 04:30), Max: 37.1 (2022 01:30)  T(F): 96.6 (2022 04:30), Max: 98.7 (2022 01:30)  HR: 83 (2022 07:00) (35 - 109)  BP: 117/64 (2022 07:00) (81/53 - 148/60)  BP(mean): 83 (2022 07:00) (47 - 117)  RR: 20 (2022 07:00) (8 - 34)  SpO2: 98% (2022 07:00) (97% - 100%)    PHYSICAL EXAM:  GEN: Awake, alert. NAD.   HEENT: NCAT, PERRL, EOMI. Mucosa moist. No JVD.  RESP: CTA b/l  CV: RRR. Normal S1/S2. No m/r/g.  ABD: Soft. NT/ND. BS+  EXT: Warm. No edema, clubbing, or cyanosis.   NEURO: AAOx3. No focal deficits.     LABS:                        8.8    6.67  )-----------( 198      ( 2022 06:37 )             26.0   133<L>  |  101  |  32<H>  ----------------------------<  134<H>  5.8<H>   |  21<L>  |  4.75<H>    Ca    9.5      2022 06:37  Phos  4.7     01-07  Mg     2.2     01-07    TPro  7.5  /  Alb  3.7  /  TBili  <0.2  /  DBili  x   /  AST  9<L>  /  ALT  <5<L>  /  AlkPhos  74  01-07    CARDIAC MARKERS ( 2022 05:53 )  x     / x     / 74 U/L / x     / x        PT/INR - ( 2022 06:37 )   PT: 13.5 sec;   INR: 1.13        PTT - ( 2022 06:37 )  PTT:33.3 sec  Urinalysis Basic - ( 2022 00:12 )    Color: Yellow / Appearance: Cloudy / S.020 / pH: x  Gluc: x / Ketone: NEGATIVE  / Bili: Small / Urobili: 0.2 E.U./dL   Blood: x / Protein: >=300 mg/dL / Nitrite: POSITIVE   Leuk Esterase: Large / RBC: x / WBC Many /HPF   Sq Epi: x / Non Sq Epi: x / Bacteria: Present /HPF    I&O's Summary    2022 07:01  -  2022 07:00  --------------------------------------------------------  IN: 567.2 mL / OUT: 890 mL / NET: -322.8 mL    < from: TTE Echo Complete w/o contrast w/ Doppler (20 @ 10:55) >   1. No significant valvular disease.   2. The right ventricle is normal in size. Right ventricular systolic function is probably normal.   3. There is mild concentric left ventricular hypertrophy. The left ventricle is normal in size and systolic function with a calculated ejection fraction of 65-70%.   4. Small pericardial effusion.  < end of copied text >   INTERVAL EVENTS:  Patient seen and examined at bedside; found resting comfortably in bed, and laying flat. Patient s/p HD o/n. He denies CP, SOB, palpitations, and dizziness.     PAST MEDICAL & SURGICAL HISTORY:  DM (diabetes mellitus)  HTN (hypertension)  HLD (hyperlipidemia)  ETOH abuse  Anemia  History of colectomy  History of ileostomy    Home Medications:  baclofen 10 mg oral tablet: 1 tab(s) orally every 6 hours (2021 19:30)  DULoxetine 30 mg oral delayed release capsule: 1  orally once a day (at bedtime) (2021 19:30)  Ferrex 150 Forte oral capsule: 1  orally once a day (2021 19:30)  ferrous sulfate 325 mg (65 mg elemental iron) oral tablet: 1  orally once a day (2021 19:30)  gabapentin 300 mg oral capsule: 4 cap(s) orally every 8 hours (2021 19:30)  Janumet 50 mg-500 mg oral tablet: 1 tab(s) orally 2 times a day (2021 19:30)  levothyroxine 50 mcg (0.05 mg) oral capsule: 1 cap(s) orally once a day (2021 19:30)  pravastatin 40 mg oral tablet: 1 tab(s) orally once a day (2021 19:30)    MEDICATIONS  (STANDING):  atorvastatin 10 milliGRAM(s) Oral at bedtime  atropine Injectable 0.5 milliGRAM(s) IV Push once  cefTRIAXone   IVPB 2000 milliGRAM(s) IV Intermittent every 24 hours  dextrose 40% Gel 15 Gram(s) Oral once  dextrose 5%. 1000 milliLiter(s) (50 mL/Hr) IV Continuous <Continuous>  dextrose 5%. 1000 milliLiter(s) (100 mL/Hr) IV Continuous <Continuous>  dextrose 50% Injectable 25 Gram(s) IV Push once  dextrose 50% Injectable 12.5 Gram(s) IV Push once  dextrose 50% Injectable 25 Gram(s) IV Push once  DOPamine Infusion 2 MICROgram(s)/kG/Min (5.61 mL/Hr) IV Continuous <Continuous>  ferrous    sulfate 325 milliGRAM(s) Oral daily  glucagon  Injectable 1 milliGRAM(s) IntraMuscular once  heparin   Injectable 5000 Unit(s) SubCutaneous every 8 hours  insulin lispro (ADMELOG) corrective regimen sliding scale   SubCutaneous Before meals and at bedtime  levothyroxine 50 MICROGram(s) Oral daily  norepinephrine Infusion 0.05 MICROgram(s)/kG/Min (7.01 mL/Hr) IV Continuous <Continuous>  traZODone 100 milliGRAM(s) Oral at bedtime    MEDICATIONS  (PRN):    Vital Signs Last 24 Hrs  T(C): 35.9 (2022 04:30), Max: 37.1 (2022 01:30)  T(F): 96.6 (2022 04:30), Max: 98.7 (2022 01:30)  HR: 83 (2022 07:00) (35 - 109)  BP: 117/64 (2022 07:00) (81/53 - 148/60)  BP(mean): 83 (2022 07:00) (47 - 117)  RR: 20 (2022 07:00) (8 - 34)  SpO2: 98% (2022 07:00) (97% - 100%)    PHYSICAL EXAM:  GEN: Awake, comfortable. NAD.   HEENT: No JVD.   RESP: CTA b/l, though poor inspiratory effort.  CV: Regular, bradycardic, normal s1/s2. No m/r/g.  ABD: Soft, NTND. BS+  EXT: Warm. No edema, clubbing, or cyanosis. S/p toe amputations.    LABS:                        8.8    6.67  )-----------( 198      ( 2022 06:37 )             26.0   133<L>  |  101  |  32<H>  ----------------------------<  134<H>  5.8<H>   |  21<L>  |  4.75<H>    Ca    9.5      2022 06:37  Phos  4.7     01-07  Mg     2.2     -07    TPro  7.5  /  Alb  3.7  /  TBili  <0.2  /  DBili  x   /  AST  9<L>  /  ALT  <5<L>  /  AlkPhos  74  01-07    CARDIAC MARKERS ( 2022 05:53 )  x     / x     / 74 U/L / x     / x        PT/INR - ( 2022 06:37 )   PT: 13.5 sec;   INR: 1.13        PTT - ( 2022 06:37 )  PTT:33.3 sec  Urinalysis Basic - ( 2022 00:12 )    Color: Yellow / Appearance: Cloudy / S.020 / pH: x  Gluc: x / Ketone: NEGATIVE  / Bili: Small / Urobili: 0.2 E.U./dL   Blood: x / Protein: >=300 mg/dL / Nitrite: POSITIVE   Leuk Esterase: Large / RBC: x / WBC Many /HPF   Sq Epi: x / Non Sq Epi: x / Bacteria: Present /HPF    I&O's Summary    2022 07:01  -  2022 07:00  --------------------------------------------------------  IN: 567.2 mL / OUT: 890 mL / NET: -322.8 mL    < from: TTE Echo Complete w/o contrast w/ Doppler (20 @ 10:55) >   1. No significant valvular disease.   2. The right ventricle is normal in size. Right ventricular systolic function is probably normal.   3. There is mild concentric left ventricular hypertrophy. The left ventricle is normal in size and systolic function with a calculated ejection fraction of 65-70%.   4. Small pericardial effusion.  < end of copied text >

## 2022-01-07 NOTE — PROGRESS NOTE ADULT - ASSESSMENT
***TRANSFER FROM MICU to [id number]***  Hospital Course: 60 y/o M HTN, DM complicated by neuropathy, EtOH liver cirrhosis, hx IVDU, toxic megacolon c/b pneumatosis s/p colectomy/ileostomy who presents with generalized weakness x 3 days found to have MEG, hyponatremia, hyperkalemia admitted for management and treatment. Repeat labs today showed that potassium was increasing again, up to 7.6, decision made for emergent dialysis. Patient altered and moving around, ativan 1mg x2 given. Line was placed but patient became michelle to 30s, rapid response was called as patient also not responsive. Gave patient flumazenil, calcium chloride (see rapid response notes for more details), and patient became alert, still disoriented. Decision made to step patient up to MICU for closer monitoring overnight. Tolerated HD. Dopamine dc'ed. Off all gtts. Now AAOx3. BMP improved. Stable to stepdown.    60 y/o M HTN, DM complicated by neuropathy, EtOH liver cirrhosis, hx IVDU, toxic megacolon c/b pneumatosis s/p colectomy/ileostomy who presents with generalized weakness x 3 days found to have MEG, hyponatremia, hyperkalemia admitted for management and treatment.    Neuro  #TME  - A&Ox3 today  - improved after emergent HD yesterday    Pulmonary  - no acute issues     Cardiovascular  #Bradycardia  - pt with episodes of bradycardia. Cardiology following and likely in setting of electrolyte derangements 2/2 to renal failure  - s/p atropine 0.5 mg x2   - started on dopamine gtt while receiving HD  - dopamine dc'ed    #HTN  Hypertension.   - Home meds lisinopril, amlodipine  - hold anti-HTN i/s/o soft BPS  - reintroduce anti-HTN when appropriate.    #HLD  - c/w home atorvastatin 10 mg QD    Renal   #MEG on CKD  -  MEG vs new onset CKD. Admission Cr 5.95, baseline Cr 1.7-1.8. AMS, urine w/ gross pus, appears euvolemic otherwise. s/p 3L NS.   - unclear etiology, ATN vs paraproteinemia vs toxic ingestion?  - K on admission was 8.0  - emergent HD 1/6   - Minimal UOP  - renal following, appreciate recs  - monitor I&O  - avoid nephrotoxic agents  - hold home cymbalta and baclofen    #Hyponatremia  - Admission Na 119. S/p 3L NS in the ED. Admitted 6 months ago for same electrolyte abnormalities. Etiology unclear. DDx includes hypovolemic hyponatremia   - renal following, f/u recs    GI  #s/p colon resection with ostomy   - patient with colostomy bag s/p colectomy for pneumatosis in setting of toxic megacolon;   - monitor output.    #Alcoholic cirrhosis   - does not appear decompensated  - check ammonia level    ID  #UTI  Plan: UA grossly positive, patient c/o dysuria  - c/w ceftriaxone    Endo  #DM  Hx DM2. 07/2021 A1c 8.1%. Home meds [place] Bustillosemir   - f/u A1c  - mISS.    Heme  #Anemia  - no evidence of bleeding. likely in setting of CKD      #Prophylaxis   F: none, HD  E: HD  N: diabetic diet  Ppx: hep subq    Code Status: full code    Dispo: MICU

## 2022-01-07 NOTE — CHART NOTE - NSCHARTNOTEFT_GEN_A_CORE
Pt pulled out L HD cath at 8pm, w/ partial broken cath still in place. applied pressure and safely removed remaining cath components from neck. noted within minutes of cath removal to be satting into 50-70s on RA w/ HR in 120s, SBP elevated up to 180s. administered IV hydralazine 10 w/ BP since returning to normotensive after approx 30 min. sats improved to mid-80s on 6L NC initially, however started downtrending again into low 80s, then placed on CPAP at 11PM w/ improvement in sat to mid-90s. pt self-removed CPAP at 11:30PM, since placed back on 6L NC satting in low 90s. since pt's self removal of HD cath, pt otherwise w/o clinical sx of sob, increased WOB, cyanosis. Repeat H/H following cath removal stable. ordered STAT CXR negative for acute infiltrates. ordered troponins and ECG showing sinus tachycardia 110-120s. noted to be in persistent sinus tachycardia prior to removal of HD cath, HR otherwise had been 70-80s since admission on 1/6. pt not reporting any sx chest pain, palpitations, back pain, extremity pain or swelling.     Thus, appears to be presentation of new-onset acute hypoxemic respiratory failure w/o prior hx structural or interstitial lung disease, abnormal cardiac function (TTE this admission showing normal EF w/ normal systolic and diastolic function) or ischemic cardiomyopathy, low clinical suspicion for aspiration, CXR findings w/o consolidation or signs atelectasis. Given improvement in saturation w/ increased O2 delivery, leading concern is for acute PE w/ moderate pretest probability and further supported w/ coinciding new-onset of sinus tachycardia that has since been persistent overnight compared to previous baseline HR b/t 70-80s since admission. Wells score calculated as 4.5 points also suggesting moderate risk w/o significant change in pretest probability. Given underlying kidney dysfunction and grossly normal CXR, ordered urgent V/Q scan to further evaluate. Patient otherwise continued on subq heparin overnight for prophylaxis given recent self-ejection of HD cath requiring prolonged pressure to achieve hemostasis.

## 2022-01-07 NOTE — PROGRESS NOTE ADULT - ATTENDING COMMENTS
New MEG with hyperkalemia and EKG changes, refratory atropine. All improved after HD. Floor eligible.

## 2022-01-07 NOTE — DIETITIAN INITIAL EVALUATION ADULT. - PROBLEM SELECTOR PLAN 5
Patient with colostomy bag s/p colectomy for pneumatosis in setting of toxic megacolon.   - continue to monitior

## 2022-01-07 NOTE — DIETITIAN INITIAL EVALUATION ADULT. - OTHER INFO
61 year old man, w/ a PMHx of cirrhosis 2/2 EtOH, HTN, DM2 with neuropathy, toxic megacolon (c/b pneumatosis s/p colectomy and ileostomy), recent admission 6 months ago for hyperkalemia and hyponatremia, p/w  generalized weakness and lightheadedness; found to be in ARF requiring emergent HD. Patient altered and moving around, ativan 1mg x2 given. Line was placed but patient became michelle to 30s, rapid response was called as patient also not responsive. Gave patient flumazenil, calcium chloride (see rapid response notes for more details), and patient became alert, still disoriented. Decision made to step patient up to MICU for closer monitoring overnight.    On assessment, pt seen sitting up in bed, noted pt to be confused. Attempted assessment with pt, but d/t AMS, he is AAOx1-2, limited information obtained. Per pt, reports UBW of 165 lbs, current adm 164 lbs. He reports eating portion sized meals PTA. Breakfast tray seen on table, pt preparing to eat. Spoke to RN, unable to assess PO intake at this time but not noted complaints of pain, n/v. Colostomy in place. Skin: nahomy 13, noted surgical incision scar. Deferred diet edu. RD to follow per protocol. Please see below for nutr recs. 61 year old man, w/ a PMHx of cirrhosis 2/2 EtOH, HTN, DM2 with neuropathy, toxic megacolon (c/b pneumatosis s/p colectomy and ileostomy), recent admission 6 months ago for hyperkalemia and hyponatremia, p/w  generalized weakness and lightheadedness; found to be in ARF requiring emergent HD. Patient altered and moving around, ativan 1mg x2 given. Line was placed but patient became michelle to 30s, rapid response was called as patient also not responsive. Gave patient flumazenil, calcium chloride (see rapid response notes for more details), and patient became alert, still disoriented. Decision made to step patient up to MICU for closer monitoring overnight.    On assessment, pt seen sitting up in bed, noted pt to be confused. Attempted assessment with pt, but d/t AMS, he is AAOx1-2, limited information obtained. Per pt, reports UBW of 165 lbs, current adm 164 lbs. He reports eating portion sized meals PTA. Breakfast tray seen on table, pt preparing to eat. Spoke to RN, unable to assess PO intake at this time but no noted complaints of pain, n/v. Colostomy in place. Skin: renee 13, noted surgical incision scar. Deferred diet edu. RD to follow per protocol. Please see below for nutr recs.

## 2022-01-07 NOTE — DIETITIAN INITIAL EVALUATION ADULT. - PROBLEM SELECTOR PLAN 4
Admission K 8.0 --> 5.7 s/p hyperkalemia cocktail in ED. EKG with peaked T waves, repeat showed improved. Admitted 6 months ago for same electrolyte abnormalities, TSH wnl, etiology unknown at that time. DDx include worsening kidney function  - f/u repeat EKG   - f/u repeat BMP  - hold home lisinopril  - lokelma 10 PO PRN if K > 5.5

## 2022-01-07 NOTE — PROGRESS NOTE ADULT - ATTENDING COMMENTS
tolerated HD well last evening  awake and alter today  repeat dialysis now- tolerating this procedure adequately as well  ongoing w/u for etiology of MEG as outlined above

## 2022-01-07 NOTE — PROGRESS NOTE ADULT - SUBJECTIVE AND OBJECTIVE BOX
incomplete INTERVAL EVENTS: LUCA    SUBJECTIVE / INTERVAL HPI: Patient seen and examined at bedside. Patient reports feeling "fine." Joseluis confusion, dizziness, chest pain, SOB, diarrhea.    ROS: negative unless otherwise stated above.    VITAL SIGNS:  Vital Signs Last 24 Hrs  T(C): 36.4 (2022 14:31), Max: 37.1 (2022 01:30)  T(F): 97.5 (2022 14:31), Max: 98.7 (2022 01:30)  HR: 76 (2022 13:45) (35 - 109)  BP: 176/74 (2022 13:45) (81/53 - 176/74)  BP(mean): 86 (2022 10:37) (47 - 107)  RR: 16 (2022 13:45) (8 - 34)  SpO2: 99% (2022 13:45) (96% - 100%)      22 @ 07:  -  22 @ 07:00  --------------------------------------------------------  IN: 567.2 mL / OUT: 890 mL / NET: -322.8 mL    22 @ 07:01  -  22 @ 15:27  --------------------------------------------------------  IN: 640 mL / OUT: 1100 mL / NET: -460 mL        PHYSICAL EXAM:    General: NAD, sitting up in bed eating breakfast  HEENT: MMM, normal oral cavity  Neck: supple  Cardiovascular: RRR, no MRG  Respiratory: CTA B/L; no W/R/R  Gastrointestinal: soft, NT/ND, ostomy in place  Extremities: WWP; no clubbing or cyanosis  Vascular: 2+ radial, DP/PT pulses B/L  Neurological: AAOx3    MEDICATIONS:  MEDICATIONS  (STANDING):  atorvastatin 10 milliGRAM(s) Oral at bedtime  cefTRIAXone   IVPB 2000 milliGRAM(s) IV Intermittent every 24 hours  dextrose 40% Gel 15 Gram(s) Oral once  dextrose 5%. 1000 milliLiter(s) (50 mL/Hr) IV Continuous <Continuous>  dextrose 5%. 1000 milliLiter(s) (100 mL/Hr) IV Continuous <Continuous>  dextrose 50% Injectable 25 Gram(s) IV Push once  dextrose 50% Injectable 12.5 Gram(s) IV Push once  dextrose 50% Injectable 25 Gram(s) IV Push once  ferrous    sulfate 325 milliGRAM(s) Oral daily  glucagon  Injectable 1 milliGRAM(s) IntraMuscular once  heparin   Injectable 5000 Unit(s) SubCutaneous every 8 hours  insulin lispro (ADMELOG) corrective regimen sliding scale   SubCutaneous Before meals and at bedtime  levothyroxine 50 MICROGram(s) Oral daily  traZODone 100 milliGRAM(s) Oral at bedtime    MEDICATIONS  (PRN):      ALLERGIES:  Allergies    No Known Allergies    Intolerances        LABS:                        8.8    6.67  )-----------( 198      ( 2022 06:37 )             26.0     01-07    133<L>  |  101  |  32<H>  ----------------------------<  134<H>  5.8<H>   |  21<L>  |  4.75<H>    Ca    9.5      2022 06:37  Phos  4.7     01-  Mg     2.2     -    TPro  7.5  /  Alb  3.7  /  TBili  <0.2  /  DBili  x   /  AST  9<L>  /  ALT  <5<L>  /  AlkPhos  74  01-07    PT/INR - ( 2022 06:37 )   PT: 13.5 sec;   INR: 1.13          PTT - ( 2022 06:37 )  PTT:33.3 sec  Urinalysis Basic - ( 2022 00:12 )    Color: Yellow / Appearance: Cloudy / S.020 / pH: x  Gluc: x / Ketone: NEGATIVE  / Bili: Small / Urobili: 0.2 E.U./dL   Blood: x / Protein: >=300 mg/dL / Nitrite: POSITIVE   Leuk Esterase: Large / RBC: x / WBC Many /HPF   Sq Epi: x / Non Sq Epi: x / Bacteria: Present /HPF      CAPILLARY BLOOD GLUCOSE      POCT Blood Glucose.: 135 mg/dL (2022 12:15)      RADIOLOGY & ADDITIONAL TESTS: Reviewed.

## 2022-01-07 NOTE — PROGRESS NOTE ADULT - ASSESSMENT
62yo M hx of ?EtOH, cirrhosis, HTN, DM2 with neuropathy, hx of toxic megacolon c/b pneumatosis s/p colectomy and ileostomy presents with acute renal failure requiring emergent HD 1/6    Anuric MEG 2/2 to ATN vs paraproteinaemia vs ?toxic ingestion  Non AG metabolic acidosis- resolved    Given vast improvement in mentation and acidosis- hypothesis that an unknown toxic metabolite was dialysed off yesterday  S/p temp HD cath on 1/6 with emergent HD  IHD today    F/u serum immunoelectropheresis- FLC of ~2; pt may require kidney biopsy once stabilised  Baseline creat~ 1  Get renal bladder sono    Dialyzer: Optiflux [id number]  QB: 400 mL/min  QD: 500 mL/min  K bath: 2  Goal UF: 0.5L  Duration: 180 min    Patient is tolerating the procedure well. Continue full hemodialysis treatment as prescribed.  Renal diet, avoid nephrotoxic meds, renally dose meds, strict I&Os

## 2022-01-07 NOTE — DIETITIAN INITIAL EVALUATION ADULT. - ADD RECOMMEND
1. Continue with current diet order 2. Monitor %PO intake, and need for ONS 3. Diet edu upon follow up if able 4. Monitor BMP, BG q6hrs, POCT, renal indices, LFTs, lytes, replete prn

## 2022-01-07 NOTE — DIETITIAN INITIAL EVALUATION ADULT. - PROBLEM SELECTOR PLAN 3
Admission Na 119. S/p 3L NS in the ED. Admitted 6 months ago for same electrolyte abnormalities. Etiology unclear. DDx includes hypovolemic hyponatremia   - f/u repeat BMP on arrival  - if overcorrecting concsider D5W vs ddavp  - BMP q4-q6 (goal Na 125-126 in 24hr, ~1/6 10pm)  - f/u U lytes, Uosm, serum osm  - nephro consult in AM

## 2022-01-07 NOTE — DIETITIAN INITIAL EVALUATION ADULT. - PROBLEM SELECTOR PLAN 6
Admission BP 82/52 --> 120/60 after 2L NS. Home meds lisinopril, amlodipine  - formal med rec in AM  - hold anti-HTN i/s/o soft BPS  - reintroduce anti-HTN when appropriate

## 2022-01-07 NOTE — DIETITIAN INITIAL EVALUATION ADULT. - OTHER CALCULATIONS
IBW used to calculate energy needs due to pt's current body weight exceeding 120% of IBW (115%). Needs adjusted age and wt, ARF. Fluids per team.

## 2022-01-07 NOTE — PROGRESS NOTE ADULT - ATTENDING COMMENTS
Initial attending contact date  1/6/22    . See fellow note written above for details. I reviewed the fellow documentation. I have personally seen and examined this patient. I reviewed vitals, labs, medications, cardiac studies, and additional imaging. I agree with the above fellow's findings and plans as written above with the following additions/statements.    -Cardiology consulted for bradycardia  -Tele and EKGS reviewed: sinus bradyarrhythmia in setting of electrolyte derangements, no AV block  -Events last night noted - due to sedation which resolved after reversal   -Today with improved HR on tele: NSR in 60s improved after correction of electrolytes post HD  -No need for further cardiac work up  -Pls reconsult as needed

## 2022-01-07 NOTE — PROGRESS NOTE ADULT - SUBJECTIVE AND OBJECTIVE BOX
~~stepdown from [facility]U to [facility]~~  Hospital Course:   62 y/o M HTN, DM complicated by neuropathy, EtOH liver cirrhosis, hx IVDU, toxic megacolon c/b pneumatosis s/p colectomy/ileostomy who presents with generalized weakness x 3 days found to have MEG, hyponatremia, hyperkalemia admitted for management and treatment. Repeat labs today showed that potassium was increasing again, up to 7.6, decision made for emergent dialysis. Patient altered and moving around, ativan 1mg x2 given. Line was placed but patient became michelle to 30s, rapid response was called as patient also not responsive. Gave patient flumazenil, calcium chloride (see rapid response notes for more details), and patient became alert, still disoriented. Decision made to step patient up to MICU for closer monitoring overnight. In MICU, patient underwent dialysis, mental status improved, stable for stepdown to [facility]    INTERVAL HPI/OVERNIGHT EVENTS:  Patient was seen and examined at bedside. As per nurse and patient, no o/n events, patient resting comfortably. No complaints at this time. Patient denies: fever, chills, dizziness, weakness, HA, Changes in vision, CP, palpitations, SOB, cough, N/V/D/C, dysuria, changes in bowel movements, LE edema. ROS otherwise negative.    VITAL SIGNS:  T(F): 97.5 (22 @ 14:31)  HR: 76 (22 @ 13:45)  BP: 176/74 (22 @ 13:45)  RR: 16 (22 @ 13:45)  SpO2: 99% (22 @ 13:45)  Wt(kg): --    PHYSICAL EXAM:    Constitutional: NAD, A&Ox3  HEENT:  MMM  Respiratory: CTA B/L; no W/R/R,   Cardiac: +S1/S2; RRR; no M/R/G;   Gastrointestinal: soft, NT/ND; no rebound or guarding; +BSx4. Ostomy in place without signs of infectious, bag half full. no CVA tenderness  Extremities: WWP, R foot s/p amputation of 3-5th toes  Vascular: 2+ radial, DP/PT pulses B/L  Dermatologic: skin warm, dry and intact; no rashes, wounds, or scars. Ulcer @ right foot, healed over, no signs of infection  Neurologic: AAOx3 no focal deficits    MEDICATIONS  (STANDING):  atorvastatin 10 milliGRAM(s) Oral at bedtime  cefTRIAXone   IVPB 2000 milliGRAM(s) IV Intermittent every 24 hours  dextrose 40% Gel 15 Gram(s) Oral once  dextrose 5%. 1000 milliLiter(s) (50 mL/Hr) IV Continuous <Continuous>  dextrose 5%. 1000 milliLiter(s) (100 mL/Hr) IV Continuous <Continuous>  dextrose 50% Injectable 25 Gram(s) IV Push once  dextrose 50% Injectable 12.5 Gram(s) IV Push once  dextrose 50% Injectable 25 Gram(s) IV Push once  ferrous    sulfate 325 milliGRAM(s) Oral daily  glucagon  Injectable 1 milliGRAM(s) IntraMuscular once  heparin   Injectable 5000 Unit(s) SubCutaneous every 8 hours  insulin lispro (ADMELOG) corrective regimen sliding scale   SubCutaneous Before meals and at bedtime  levothyroxine 50 MICROGram(s) Oral daily  traZODone 100 milliGRAM(s) Oral at bedtime    MEDICATIONS  (PRN):      Allergies    No Known Allergies    Intolerances        LABS:                        8.8    6.67  )-----------( 198      ( 2022 06:37 )             26.0     01-07    133<L>  |  101  |  32<H>  ----------------------------<  134<H>  5.8<H>   |  21<L>  |  4.75<H>    Ca    9.5      2022 06:37  Phos  4.7     -07  Mg     2.2     -    TPro  7.5  /  Alb  3.7  /  TBili  <0.2  /  DBili  x   /  AST  9<L>  /  ALT  <5<L>  /  AlkPhos  74  01-07    PT/INR - ( 2022 06:37 )   PT: 13.5 sec;   INR: 1.13          PTT - ( 2022 06:37 )  PTT:33.3 sec  Urinalysis Basic - ( 2022 00:12 )    Color: Yellow / Appearance: Cloudy / S.020 / pH: x  Gluc: x / Ketone: NEGATIVE  / Bili: Small / Urobili: 0.2 E.U./dL   Blood: x / Protein: >=300 mg/dL / Nitrite: POSITIVE   Leuk Esterase: Large / RBC: x / WBC Many /HPF   Sq Epi: x / Non Sq Epi: x / Bacteria: Present /HPF        RADIOLOGY & ADDITIONAL TESTS:  Reviewed

## 2022-01-08 LAB
-  AMIKACIN: SIGNIFICANT CHANGE UP
-  AMOXICILLIN/CLAVULANIC ACID: SIGNIFICANT CHANGE UP
-  AMPICILLIN/SULBACTAM: SIGNIFICANT CHANGE UP
-  AMPICILLIN: SIGNIFICANT CHANGE UP
-  AZTREONAM: SIGNIFICANT CHANGE UP
-  CEFAZOLIN: SIGNIFICANT CHANGE UP
-  CEFEPIME: SIGNIFICANT CHANGE UP
-  CEFOXITIN: SIGNIFICANT CHANGE UP
-  CEFTRIAXONE: SIGNIFICANT CHANGE UP
-  CIPROFLOXACIN: SIGNIFICANT CHANGE UP
-  ERTAPENEM: SIGNIFICANT CHANGE UP
-  GENTAMICIN: SIGNIFICANT CHANGE UP
-  IMIPENEM: SIGNIFICANT CHANGE UP
-  LEVOFLOXACIN: SIGNIFICANT CHANGE UP
-  MEROPENEM: SIGNIFICANT CHANGE UP
-  NITROFURANTOIN: SIGNIFICANT CHANGE UP
-  PIPERACILLIN/TAZOBACTAM: SIGNIFICANT CHANGE UP
-  TIGECYCLINE: SIGNIFICANT CHANGE UP
-  TOBRAMYCIN: SIGNIFICANT CHANGE UP
-  TRIMETHOPRIM/SULFAMETHOXAZOLE: SIGNIFICANT CHANGE UP
ALBUMIN SERPL ELPH-MCNC: 3.7 G/DL — SIGNIFICANT CHANGE UP (ref 3.3–5)
ALP SERPL-CCNC: 75 U/L — SIGNIFICANT CHANGE UP (ref 40–120)
ALT FLD-CCNC: <5 U/L — LOW (ref 10–45)
ANION GAP SERPL CALC-SCNC: 16 MMOL/L — SIGNIFICANT CHANGE UP (ref 5–17)
AST SERPL-CCNC: 9 U/L — LOW (ref 10–40)
BILIRUB SERPL-MCNC: <0.2 MG/DL — SIGNIFICANT CHANGE UP (ref 0.2–1.2)
BUN SERPL-MCNC: 30 MG/DL — HIGH (ref 7–23)
CALCIUM SERPL-MCNC: 8.8 MG/DL — SIGNIFICANT CHANGE UP (ref 8.4–10.5)
CHLORIDE SERPL-SCNC: 97 MMOL/L — SIGNIFICANT CHANGE UP (ref 96–108)
CO2 SERPL-SCNC: 21 MMOL/L — LOW (ref 22–31)
CREAT SERPL-MCNC: 3.62 MG/DL — HIGH (ref 0.5–1.3)
CULTURE RESULTS: SIGNIFICANT CHANGE UP
GLUCOSE BLDC GLUCOMTR-MCNC: 180 MG/DL — HIGH (ref 70–99)
GLUCOSE BLDC GLUCOMTR-MCNC: 182 MG/DL — HIGH (ref 70–99)
GLUCOSE BLDC GLUCOMTR-MCNC: 187 MG/DL — HIGH (ref 70–99)
GLUCOSE BLDC GLUCOMTR-MCNC: 235 MG/DL — HIGH (ref 70–99)
GLUCOSE SERPL-MCNC: 217 MG/DL — HIGH (ref 70–99)
HCT VFR BLD CALC: 31 % — LOW (ref 39–50)
HGB BLD-MCNC: 10.6 G/DL — LOW (ref 13–17)
MAGNESIUM SERPL-MCNC: 1.8 MG/DL — SIGNIFICANT CHANGE UP (ref 1.6–2.6)
MCHC RBC-ENTMCNC: 29.5 PG — SIGNIFICANT CHANGE UP (ref 27–34)
MCHC RBC-ENTMCNC: 34.2 GM/DL — SIGNIFICANT CHANGE UP (ref 32–36)
MCV RBC AUTO: 86.4 FL — SIGNIFICANT CHANGE UP (ref 80–100)
METHOD TYPE: SIGNIFICANT CHANGE UP
NRBC # BLD: 0 /100 WBCS — SIGNIFICANT CHANGE UP (ref 0–0)
ORGANISM # SPEC MICROSCOPIC CNT: SIGNIFICANT CHANGE UP
ORGANISM # SPEC MICROSCOPIC CNT: SIGNIFICANT CHANGE UP
PHOSPHATE SERPL-MCNC: 4.1 MG/DL — SIGNIFICANT CHANGE UP (ref 2.5–4.5)
PLATELET # BLD AUTO: 225 K/UL — SIGNIFICANT CHANGE UP (ref 150–400)
POTASSIUM SERPL-MCNC: 4.8 MMOL/L — SIGNIFICANT CHANGE UP (ref 3.5–5.3)
POTASSIUM SERPL-SCNC: 4.8 MMOL/L — SIGNIFICANT CHANGE UP (ref 3.5–5.3)
PROT SERPL-MCNC: 8.1 G/DL — SIGNIFICANT CHANGE UP (ref 6–8.3)
RBC # BLD: 3.59 M/UL — LOW (ref 4.2–5.8)
RBC # FLD: 14 % — SIGNIFICANT CHANGE UP (ref 10.3–14.5)
SODIUM SERPL-SCNC: 134 MMOL/L — LOW (ref 135–145)
SPECIMEN SOURCE: SIGNIFICANT CHANGE UP
WBC # BLD: 9.34 K/UL — SIGNIFICANT CHANGE UP (ref 3.8–10.5)
WBC # FLD AUTO: 9.34 K/UL — SIGNIFICANT CHANGE UP (ref 3.8–10.5)

## 2022-01-08 PROCEDURE — 99232 SBSQ HOSP IP/OBS MODERATE 35: CPT | Mod: GC

## 2022-01-08 PROCEDURE — 71045 X-RAY EXAM CHEST 1 VIEW: CPT | Mod: 26

## 2022-01-08 PROCEDURE — 99233 SBSQ HOSP IP/OBS HIGH 50: CPT | Mod: GC

## 2022-01-08 PROCEDURE — 76775 US EXAM ABDO BACK WALL LIM: CPT | Mod: 26

## 2022-01-08 RX ORDER — LABETALOL HCL 100 MG
10 TABLET ORAL ONCE
Refills: 0 | Status: COMPLETED | OUTPATIENT
Start: 2022-01-08 | End: 2022-01-08

## 2022-01-08 RX ORDER — IRON SUCROSE 20 MG/ML
200 INJECTION, SOLUTION INTRAVENOUS EVERY 24 HOURS
Refills: 0 | Status: COMPLETED | OUTPATIENT
Start: 2022-01-08 | End: 2022-01-12

## 2022-01-08 RX ORDER — AMLODIPINE BESYLATE 2.5 MG/1
5 TABLET ORAL EVERY 24 HOURS
Refills: 0 | Status: DISCONTINUED | OUTPATIENT
Start: 2022-01-08 | End: 2022-01-09

## 2022-01-08 RX ADMIN — CEFTRIAXONE 100 MILLIGRAM(S): 500 INJECTION, POWDER, FOR SOLUTION INTRAMUSCULAR; INTRAVENOUS at 15:19

## 2022-01-08 RX ADMIN — Medication 325 MILLIGRAM(S): at 15:18

## 2022-01-08 RX ADMIN — Medication 1: at 11:37

## 2022-01-08 RX ADMIN — IRON SUCROSE 110 MILLIGRAM(S): 20 INJECTION, SOLUTION INTRAVENOUS at 23:23

## 2022-01-08 RX ADMIN — Medication 2: at 06:09

## 2022-01-08 RX ADMIN — Medication 50 MICROGRAM(S): at 05:14

## 2022-01-08 RX ADMIN — HEPARIN SODIUM 5000 UNIT(S): 5000 INJECTION INTRAVENOUS; SUBCUTANEOUS at 05:14

## 2022-01-08 RX ADMIN — Medication 10 MILLIGRAM(S): at 23:23

## 2022-01-08 RX ADMIN — HEPARIN SODIUM 5000 UNIT(S): 5000 INJECTION INTRAVENOUS; SUBCUTANEOUS at 23:22

## 2022-01-08 RX ADMIN — HEPARIN SODIUM 5000 UNIT(S): 5000 INJECTION INTRAVENOUS; SUBCUTANEOUS at 15:19

## 2022-01-08 RX ADMIN — Medication 100 MILLIGRAM(S): at 23:21

## 2022-01-08 RX ADMIN — Medication 1: at 21:44

## 2022-01-08 RX ADMIN — AMLODIPINE BESYLATE 5 MILLIGRAM(S): 2.5 TABLET ORAL at 18:10

## 2022-01-08 RX ADMIN — QUETIAPINE FUMARATE 25 MILLIGRAM(S): 200 TABLET, FILM COATED ORAL at 22:21

## 2022-01-08 RX ADMIN — ATORVASTATIN CALCIUM 10 MILLIGRAM(S): 80 TABLET, FILM COATED ORAL at 23:22

## 2022-01-08 RX ADMIN — Medication 1: at 18:11

## 2022-01-08 NOTE — PROGRESS NOTE ADULT - ATTENDING COMMENTS
I: HTN controlled. Last dialyzed Yesterday. HGB 10.7.   Stable creatinine. Mental status improved.   A: Anemia stable. Stable MEG.Encephalopathy improved.   P: No indications for dialysis. Continue BP meds. Follow SCr.

## 2022-01-08 NOTE — PROGRESS NOTE ADULT - SUBJECTIVE AND OBJECTIVE BOX
Patient is a 61y Male seen and evaluated at bedside. Pt removed HD cath overnight. Unclear what his medical literacy is. Explained that his aetiology of renal failure is unclear and if he doesn't recover, he may need a kidney biopsy. Pending repeat labs today- pt refused earlier. Unclear accuracy of urine outpt according to primary team.     Meds:    atorvastatin 10 at bedtime  cefTRIAXone   IVPB 2000 every 24 hours  dextrose 40% Gel 15 once  dextrose 5%. 1000 <Continuous>  dextrose 5%. 1000 <Continuous>  dextrose 50% Injectable 25 once  dextrose 50% Injectable 12.5 once  dextrose 50% Injectable 25 once  ferrous    sulfate 325 daily  glucagon  Injectable 1 once  heparin   Injectable 5000 every 8 hours  insulin lispro (ADMELOG) corrective regimen sliding scale  Before meals and at bedtime  levothyroxine 50 daily  QUEtiapine 25 at bedtime  traZODone 100 at bedtime      T(C): , Max: 37 (01-08-22 @ 01:07)  T(F): , Max: 98.6 (01-08-22 @ 01:07)  HR: 102 (01-08-22 @ 11:45)  BP: 130/77 (01-08-22 @ 11:45)  BP(mean): 99 (01-08-22 @ 11:45)  RR: 18 (01-08-22 @ 11:45)  SpO2: 99% (01-08-22 @ 11:45)  Wt(kg): --    01-07 @ 07:01  -  01-08 @ 07:00  --------------------------------------------------------  IN: 640 mL / OUT: 2000 mL / NET: -1360 mL    01-08 @ 07:01  -  01-08 @ 14:39  --------------------------------------------------------  IN: 0 mL / OUT: 75 mL / NET: -75 mL    Review of Systems:  RESPIRATORY: No shortness of breath  CARDIOVASCULAR: No chest pain   MUSCULOSKELETAL: No leg oedema    PHYSICAL EXAM:  GENERAL: Awake, alert, communicative on RA  CHEST/LUNG: Bilateral clear breath sounds, no rales  HEART: Regular rate and rhythm, no murmur  ABDOMEN: Soft, nontender, non distended  EXTREMITIES: No pedal oedema  ACCESS:  none    LABS:                        10.7   7.72  )-----------( 223      ( 07 Jan 2022 21:39 )             30.9     01-07    133<L>  |  96  |  17  ----------------------------<  260<H>  4.3   |  23  |  3.01<H>    Ca    8.9      07 Jan 2022 21:39  Phos  3.7     01-07  Mg     1.8     01-07    TPro  7.5  /  Alb  3.7  /  TBili  <0.2  /  DBili  x   /  AST  9<L>  /  ALT  <5<L>  /  AlkPhos  74  01-07      PT/INR - ( 07 Jan 2022 06:37 )   PT: 13.5 sec;   INR: 1.13          PTT - ( 07 Jan 2022 06:37 )  PTT:33.3 sec          RADIOLOGY & ADDITIONAL STUDIES:

## 2022-01-08 NOTE — PROGRESS NOTE ADULT - SUBJECTIVE AND OBJECTIVE BOX
INTERVAL HPI/OVERNIGHT EVENTS:  Patient was seen and examined at bedside. See chart note for o/v events (pulled HD cath, tachy, hypoxic). In AM, patient subjectively feeling better, saying he wants to leave. Denies HA, CP, palpitations, SOB, cough, N/V/D/C.     VITAL SIGNS:  T(F): 98.3 (01-08-22 @ 13:43)  HR: 102 (01-08-22 @ 11:45)  BP: 130/77 (01-08-22 @ 11:45)  RR: 18 (01-08-22 @ 11:45)  SpO2: 99% (01-08-22 @ 11:45)  Wt(kg): --    PHYSICAL EXAM:    Constitutional: NAD, A&Ox3 but altered  HEENT:  MMM  Respiratory: CTA B/L; no W/R/R,   Cardiac: +S1/S2; tachycardic, regular rhythm; no M/R/G;   Gastrointestinal: soft, NT/ND; no rebound or guarding; +BSx4. Ostomy in place without signs of infectious, bag half full. no CVA tenderness  Extremities: WWP, R foot s/p amputation of 3-5th toes  Vascular: 2+ radial, DP/PT pulses B/L  Dermatologic: skin warm, dry and intact; no rashes, wounds, or scars. Ulcer @ right foot, healed over, no signs of infection  Neurologic: AAOx3 no focal deficits    MEDICATIONS  (STANDING):  atorvastatin 10 milliGRAM(s) Oral at bedtime  cefTRIAXone   IVPB 2000 milliGRAM(s) IV Intermittent every 24 hours  dextrose 40% Gel 15 Gram(s) Oral once  dextrose 5%. 1000 milliLiter(s) (50 mL/Hr) IV Continuous <Continuous>  dextrose 5%. 1000 milliLiter(s) (100 mL/Hr) IV Continuous <Continuous>  dextrose 50% Injectable 25 Gram(s) IV Push once  dextrose 50% Injectable 25 Gram(s) IV Push once  dextrose 50% Injectable 12.5 Gram(s) IV Push once  ferrous    sulfate 325 milliGRAM(s) Oral daily  glucagon  Injectable 1 milliGRAM(s) IntraMuscular once  heparin   Injectable 5000 Unit(s) SubCutaneous every 8 hours  insulin lispro (ADMELOG) corrective regimen sliding scale   SubCutaneous Before meals and at bedtime  levothyroxine 50 MICROGram(s) Oral daily  QUEtiapine 25 milliGRAM(s) Oral at bedtime  traZODone 100 milliGRAM(s) Oral at bedtime    MEDICATIONS  (PRN):      Allergies    No Known Allergies    Intolerances        LABS:                        10.6   9.34  )-----------( 225      ( 08 Jan 2022 16:27 )             31.0     01-08    134<L>  |  97  |  30<H>  ----------------------------<  217<H>  4.8   |  21<L>  |  3.62<H>    Ca    8.8      08 Jan 2022 16:27  Phos  4.1     01-08  Mg     1.8     01-08    TPro  8.1  /  Alb  3.7  /  TBili  <0.2  /  DBili  x   /  AST  9<L>  /  ALT  <5<L>  /  AlkPhos  75  01-08    PT/INR - ( 07 Jan 2022 06:37 )   PT: 13.5 sec;   INR: 1.13          PTT - ( 07 Jan 2022 06:37 )  PTT:33.3 sec      RADIOLOGY & ADDITIONAL TESTS:  Reviewed

## 2022-01-08 NOTE — PROGRESS NOTE ADULT - ASSESSMENT
62 y/o M HTN, DM complicated by neuropathy, EtOH liver cirrhosis, hx IVDU, toxic megacolon c/b pneumatosis s/p colectomy/ileostomy who presents with generalized weakness x 3 days found to have MEG, hyponatremia, hyperkalemia admitted for management and treatment.    Neuro  #AMS  Mental status significantly improved today after dialysis. Likely 2/2 ingestion, UTI could be contributing  -complete workup for AMS (VRDL, TSH etc)    Pulmonary  - no acute issues     Cardiovascular  #Bradycardia  - pt with episodes of bradycardia. Cardiology following and likely in setting of electrolyte derangements 2/2 to renal failure  - s/p atropine 0.5 mg x2   - started on dopamine gtt while receiving HD, now off pressors  -HR actually tachy today  -TTE unremarkable    #HTN  Hypertension.   - Home meds lisinopril, amlodipine  - hold anti-HTN i/s/o soft BPS  - reintroduce anti-HTN when appropriate.    #HLD  - c/w home atorvastatin 10 mg QD    Renal   #MEG on CKD  -  MEG vs new onset CKD. Admission Cr 5.95, baseline Cr 1.7-1.8. AMS, urine w/ gross pus, appears euvolemic otherwise. s/p 3L NS.   - unclear etiology, ATN vs paraproteinemia vs toxic ingestion?  Creat as high as 6.52. as low as 2.87 yesterday, back up to 3.62 today  -s/p emergent dialysis on 1/6, and again on 1/7, no plans for dialysis this weekend  - K on admission was 8.0, now 4.8 today  - renal following, appreciate recs  -f/u renal ultrasound  - monitor I&O  - avoid nephrotoxic agents  - hold home cymbalta and baclofen    #hyperkalemia  Potassium as high as 8, w/ peaked T waves, was labile yesterday, patient underwent emergent dialysis  Today potassium 4.8  -trend BMP      #Hyponatremia  Admission Na 119. S/p 3L NS in the ED. Admitted 6 months ago for same electrolyte abnormalities. Etiology unclear. DDx includes hypovolemic hyponatremia   Na of 134 today  - renal following, f/u recs      GI  #s/p colon resection with ostomy   - patient with colostomy bag s/p colectomy for pneumatosis in setting of toxic megacolon;   - monitor output.        ID  #UTI  Plan: UA grossly positive, patient c/o dysuria, UCx growing klebsiella  -f/u sensitivities  - c/w ceftriaxone 2g    Endo  #DM  Hx DM2. 07/2021 A1c 8.1%. Home meds Latesha Bustillos   - f/u A1c  - mISS.    Heme  #Anemia  - no evidence of bleeding. likely in setting of CKD  - check iron panel, b12 and folate      #Prophylaxis   F: none, HD  E: HD  N: diabetic diet  Ppx: hep subq    Code Status: full code    Dispo: MICU

## 2022-01-08 NOTE — PROGRESS NOTE ADULT - ASSESSMENT
60yo M hx of ?EtOH, cirrhosis, HTN, DM2 with neuropathy, hx of toxic megacolon c/b pneumatosis s/p colectomy and ileostomy presents with acute renal failure requiring emergent HD 1/6 and 1/7    Anuric MEG 2/2 to ATN vs paraproteinaemia vs ?toxic ingestion  Non AG metabolic acidosis- resolved    Given vast improvement in mentation and acidosis- hypothesise that an unknown toxic metabolite was dialysed off  S/p self removal of HD cath on 1/7  Defer HD this weekend, will monitor urine outpt and creat for evidence of recovery  Discussed possible bx with pt  F/u serum immunoelectropheresis- FLC of ~2  Baseline creat~ 1  Get renal bladder sono    Can start IV iron 200mg x5days  F/u repeat labs- notify nephrology    Renal diet, avoid nephrotoxic meds, renally dose meds, strict I&Os

## 2022-01-09 LAB
ALBUMIN SERPL ELPH-MCNC: 3.9 G/DL — SIGNIFICANT CHANGE UP (ref 3.3–5)
ALP SERPL-CCNC: 72 U/L — SIGNIFICANT CHANGE UP (ref 40–120)
ALT FLD-CCNC: <5 U/L — LOW (ref 10–45)
ANION GAP SERPL CALC-SCNC: 16 MMOL/L — SIGNIFICANT CHANGE UP (ref 5–17)
AST SERPL-CCNC: 9 U/L — LOW (ref 10–40)
BASOPHILS # BLD AUTO: 0.03 K/UL — SIGNIFICANT CHANGE UP (ref 0–0.2)
BASOPHILS NFR BLD AUTO: 0.4 % — SIGNIFICANT CHANGE UP (ref 0–2)
BILIRUB SERPL-MCNC: 0.2 MG/DL — SIGNIFICANT CHANGE UP (ref 0.2–1.2)
BUN SERPL-MCNC: 38 MG/DL — HIGH (ref 7–23)
CALCIUM SERPL-MCNC: 9.1 MG/DL — SIGNIFICANT CHANGE UP (ref 8.4–10.5)
CHLORIDE SERPL-SCNC: 97 MMOL/L — SIGNIFICANT CHANGE UP (ref 96–108)
CO2 SERPL-SCNC: 20 MMOL/L — LOW (ref 22–31)
CREAT SERPL-MCNC: 3.32 MG/DL — HIGH (ref 0.5–1.3)
EOSINOPHIL # BLD AUTO: 0.33 K/UL — SIGNIFICANT CHANGE UP (ref 0–0.5)
EOSINOPHIL NFR BLD AUTO: 4.2 % — SIGNIFICANT CHANGE UP (ref 0–6)
GLUCOSE BLDC GLUCOMTR-MCNC: 191 MG/DL — HIGH (ref 70–99)
GLUCOSE BLDC GLUCOMTR-MCNC: 198 MG/DL — HIGH (ref 70–99)
GLUCOSE BLDC GLUCOMTR-MCNC: 217 MG/DL — HIGH (ref 70–99)
GLUCOSE BLDC GLUCOMTR-MCNC: 258 MG/DL — HIGH (ref 70–99)
GLUCOSE SERPL-MCNC: 206 MG/DL — HIGH (ref 70–99)
HCT VFR BLD CALC: 28.3 % — LOW (ref 39–50)
HGB BLD-MCNC: 9.4 G/DL — LOW (ref 13–17)
IMM GRANULOCYTES NFR BLD AUTO: 0.6 % — SIGNIFICANT CHANGE UP (ref 0–1.5)
LYMPHOCYTES # BLD AUTO: 1.03 K/UL — SIGNIFICANT CHANGE UP (ref 1–3.3)
LYMPHOCYTES # BLD AUTO: 13.2 % — SIGNIFICANT CHANGE UP (ref 13–44)
MAGNESIUM SERPL-MCNC: 1.9 MG/DL — SIGNIFICANT CHANGE UP (ref 1.6–2.6)
MCHC RBC-ENTMCNC: 29.2 PG — SIGNIFICANT CHANGE UP (ref 27–34)
MCHC RBC-ENTMCNC: 33.2 GM/DL — SIGNIFICANT CHANGE UP (ref 32–36)
MCV RBC AUTO: 87.9 FL — SIGNIFICANT CHANGE UP (ref 80–100)
MONOCYTES # BLD AUTO: 0.59 K/UL — SIGNIFICANT CHANGE UP (ref 0–0.9)
MONOCYTES NFR BLD AUTO: 7.5 % — SIGNIFICANT CHANGE UP (ref 2–14)
NEUTROPHILS # BLD AUTO: 5.8 K/UL — SIGNIFICANT CHANGE UP (ref 1.8–7.4)
NEUTROPHILS NFR BLD AUTO: 74.1 % — SIGNIFICANT CHANGE UP (ref 43–77)
NRBC # BLD: 0 /100 WBCS — SIGNIFICANT CHANGE UP (ref 0–0)
PHOSPHATE SERPL-MCNC: 5 MG/DL — HIGH (ref 2.5–4.5)
PLATELET # BLD AUTO: 218 K/UL — SIGNIFICANT CHANGE UP (ref 150–400)
POTASSIUM SERPL-MCNC: 4.4 MMOL/L — SIGNIFICANT CHANGE UP (ref 3.5–5.3)
POTASSIUM SERPL-SCNC: 4.4 MMOL/L — SIGNIFICANT CHANGE UP (ref 3.5–5.3)
PROT SERPL-MCNC: 8.1 G/DL — SIGNIFICANT CHANGE UP (ref 6–8.3)
RBC # BLD: 3.22 M/UL — LOW (ref 4.2–5.8)
RBC # FLD: 13.8 % — SIGNIFICANT CHANGE UP (ref 10.3–14.5)
SODIUM SERPL-SCNC: 133 MMOL/L — LOW (ref 135–145)
WBC # BLD: 7.83 K/UL — SIGNIFICANT CHANGE UP (ref 3.8–10.5)
WBC # FLD AUTO: 7.83 K/UL — SIGNIFICANT CHANGE UP (ref 3.8–10.5)

## 2022-01-09 PROCEDURE — 99233 SBSQ HOSP IP/OBS HIGH 50: CPT | Mod: GC

## 2022-01-09 PROCEDURE — 99232 SBSQ HOSP IP/OBS MODERATE 35: CPT | Mod: GC

## 2022-01-09 RX ORDER — AMLODIPINE BESYLATE 2.5 MG/1
10 TABLET ORAL EVERY 24 HOURS
Refills: 0 | Status: DISCONTINUED | OUTPATIENT
Start: 2022-01-09 | End: 2022-01-09

## 2022-01-09 RX ORDER — HYDRALAZINE HCL 50 MG
10 TABLET ORAL ONCE
Refills: 0 | Status: COMPLETED | OUTPATIENT
Start: 2022-01-09 | End: 2022-01-09

## 2022-01-09 RX ORDER — HYDRALAZINE HCL 50 MG
10 TABLET ORAL EVERY 4 HOURS
Refills: 0 | Status: DISCONTINUED | OUTPATIENT
Start: 2022-01-09 | End: 2022-01-09

## 2022-01-09 RX ORDER — LABETALOL HCL 100 MG
10 TABLET ORAL EVERY 6 HOURS
Refills: 0 | Status: DISCONTINUED | OUTPATIENT
Start: 2022-01-09 | End: 2022-01-09

## 2022-01-09 RX ORDER — HYDRALAZINE HCL 50 MG
25 TABLET ORAL EVERY 8 HOURS
Refills: 0 | Status: DISCONTINUED | OUTPATIENT
Start: 2022-01-09 | End: 2022-01-11

## 2022-01-09 RX ORDER — LABETALOL HCL 100 MG
10 TABLET ORAL ONCE
Refills: 0 | Status: COMPLETED | OUTPATIENT
Start: 2022-01-09 | End: 2022-01-09

## 2022-01-09 RX ORDER — LABETALOL HCL 100 MG
10 TABLET ORAL EVERY 4 HOURS
Refills: 0 | Status: DISCONTINUED | OUTPATIENT
Start: 2022-01-09 | End: 2022-01-09

## 2022-01-09 RX ORDER — HYDRALAZINE HCL 50 MG
10 TABLET ORAL ONCE
Refills: 0 | Status: DISCONTINUED | OUTPATIENT
Start: 2022-01-09 | End: 2022-01-09

## 2022-01-09 RX ORDER — AMLODIPINE BESYLATE 2.5 MG/1
10 TABLET ORAL EVERY 24 HOURS
Refills: 0 | Status: DISCONTINUED | OUTPATIENT
Start: 2022-01-09 | End: 2022-01-19

## 2022-01-09 RX ORDER — HYDRALAZINE HCL 50 MG
10 TABLET ORAL EVERY 4 HOURS
Refills: 0 | Status: DISCONTINUED | OUTPATIENT
Start: 2022-01-09 | End: 2022-01-14

## 2022-01-09 RX ADMIN — Medication 10 MILLIGRAM(S): at 13:55

## 2022-01-09 RX ADMIN — Medication 3: at 12:20

## 2022-01-09 RX ADMIN — Medication 50 MICROGRAM(S): at 06:00

## 2022-01-09 RX ADMIN — AMLODIPINE BESYLATE 10 MILLIGRAM(S): 2.5 TABLET ORAL at 15:02

## 2022-01-09 RX ADMIN — HEPARIN SODIUM 5000 UNIT(S): 5000 INJECTION INTRAVENOUS; SUBCUTANEOUS at 06:00

## 2022-01-09 RX ADMIN — CEFTRIAXONE 100 MILLIGRAM(S): 500 INJECTION, POWDER, FOR SOLUTION INTRAMUSCULAR; INTRAVENOUS at 17:44

## 2022-01-09 RX ADMIN — Medication 25 MILLIGRAM(S): at 23:57

## 2022-01-09 RX ADMIN — Medication 10 MILLIGRAM(S): at 04:24

## 2022-01-09 RX ADMIN — Medication 10 MILLIGRAM(S): at 15:29

## 2022-01-09 RX ADMIN — QUETIAPINE FUMARATE 25 MILLIGRAM(S): 200 TABLET, FILM COATED ORAL at 21:10

## 2022-01-09 RX ADMIN — Medication 100 MILLIGRAM(S): at 21:10

## 2022-01-09 RX ADMIN — Medication 1: at 06:22

## 2022-01-09 RX ADMIN — Medication 2: at 21:38

## 2022-01-09 RX ADMIN — HEPARIN SODIUM 5000 UNIT(S): 5000 INJECTION INTRAVENOUS; SUBCUTANEOUS at 21:10

## 2022-01-09 RX ADMIN — Medication 25 MILLIGRAM(S): at 16:15

## 2022-01-09 RX ADMIN — IRON SUCROSE 110 MILLIGRAM(S): 20 INJECTION, SOLUTION INTRAVENOUS at 21:11

## 2022-01-09 RX ADMIN — ATORVASTATIN CALCIUM 10 MILLIGRAM(S): 80 TABLET, FILM COATED ORAL at 21:10

## 2022-01-09 RX ADMIN — Medication 10 MILLIGRAM(S): at 15:49

## 2022-01-09 NOTE — PROGRESS NOTE ADULT - ASSESSMENT
60 y/o M HTN, DM complicated by neuropathy, EtOH liver cirrhosis, hx IVDU, toxic megacolon c/b pneumatosis s/p colectomy/ileostomy who presents with generalized weakness x 3 days found to have MEG, hyponatremia, hyperkalemia admitted for management and treatment.    Neuro  #AMS  Mental status significantly improved today after dialysis. Likely 2/2 ingestion, UTI could be contributing  -complete workup for AMS (VRDL, TSH etc)    Pulmonary  - no acute issues     Cardiovascular  #Bradycardia  - pt with episodes of bradycardia. Cardiology following and likely in setting of electrolyte derangements 2/2 to renal failure  - s/p atropine 0.5 mg x2   - started on dopamine gtt while receiving HD, now off pressors  -HR actually tachy today  -TTE unremarkable    #HTN  Hypertension.   - Home meds lisinopril, amlodipine  - hold anti-HTN i/s/o soft BPS  - reintroduce anti-HTN when appropriate.    #HLD  - c/w home atorvastatin 10 mg QD    Renal   #MEG on CKD  -  MEG vs new onset CKD. Admission Cr 5.95, baseline Cr 1.7-1.8. AMS, urine w/ gross pus, appears euvolemic otherwise. s/p 3L NS.   - unclear etiology, ATN vs paraproteinemia vs toxic ingestion?  Creat as high as 6.52. as low as 2.87 yesterday, back up to 3.62 today  -s/p emergent dialysis on 1/6, and again on 1/7, no plans for dialysis this weekend  - K on admission was 8.0, now 4.8 today  - renal following, appreciate recs  -f/u renal ultrasound  - monitor I&O  - avoid nephrotoxic agents  - hold home cymbalta and baclofen    #hyperkalemia  Potassium as high as 8, w/ peaked T waves, was labile yesterday, patient underwent emergent dialysis  Today potassium 4.8  -trend BMP      #Hyponatremia  Admission Na 119. S/p 3L NS in the ED. Admitted 6 months ago for same electrolyte abnormalities. Etiology unclear. DDx includes hypovolemic hyponatremia   Na of 134 today  - renal following, f/u recs      GI  #s/p colon resection with ostomy   - patient with colostomy bag s/p colectomy for pneumatosis in setting of toxic megacolon;   - monitor output.        ID  #UTI  Plan: UA grossly positive, patient c/o dysuria, UCx growing klebsiella  -f/u sensitivities  - c/w ceftriaxone 2g    Endo  #DM  Hx DM2. 07/2021 A1c 8.1%. Home meds Latesha Bustillos   - f/u A1c  - mISS.    Heme  #Anemia  - no evidence of bleeding. likely in setting of CKD  - check iron panel, b12 and folate      #Prophylaxis   F: none, HD  E: HD  N: diabetic diet  Ppx: hep subq    Code Status: full code    Dispo: MICU   60 y/o M HTN, DM complicated by neuropathy, EtOH liver cirrhosis, hx IVDU, toxic megacolon c/b pneumatosis s/p colectomy/ileostomy who presents with generalized weakness x 3 days found to have MEG, hyponatremia, hyperkalemia admitted for management and treatment.    Neuro  #AMS  Mental status significantly improved today after dialysis. Likely 2/2 ingestion, UTI could be contributing  -complete workup for AMS (VRDL, TSH etc)    Pulmonary  - no acute issues     Cardiovascular  #Bradycardia  - pt with episodes of bradycardia. Cardiology following and likely in setting of electrolyte derangements 2/2 to renal failure  - s/p atropine 0.5 mg x2   - started on dopamine gtt while receiving HD, now off pressors  - HR actually tachy today  - TTE unremarkable    #HTN  Hypertension.   - Home meds lisinopril, amlodipine  - started on amlodipine 10 mg daily  - started on labetalol 10 mg IV q 6hrs if SBP above 180    #HLD  - c/w home atorvastatin 10 mg QD    Renal   #MEG on CKD  -  MEG vs new onset CKD. Admission Cr 5.95, baseline Cr 1.7-1.8. AMS, urine w/ gross pus, appears euvolemic otherwise. s/p 3L NS.   - unclear etiology, ATN vs paraproteinemia vs toxic ingestion?  Creat as high as 6.52. as low as 2.87 yesterday, back up to 3.62 today  - s/p emergent dialysis on 1/6, and again on 1/7  - K on admission was 8.0, now 4.8 today  - renal following, appreciate recs  - monitor I&O  - BS q6hrs   - avoid nephrotoxic agents  - hold home cymbalta and baclofen  - F/u serum immunoelectropheresis- FLC of ~2  - renal US: Nl sized kidneys without echogenicity c/w acute process  - S/p self removal of HD cath on 1/7, making urine likely recovering as per Nephro no need for emergent HD   - trend BMP, patient refusing labs     #hyperkalemia  Potassium as high as 8, w/ peaked T waves, was labile yesterday, patient underwent emergent dialysis  Today potassium 4.8  - trend BMP, patient refusing labs       #Hyponatremia  Admission Na 119. S/p 3L NS in the ED. Admitted 6 months ago for same electrolyte abnormalities. Etiology unclear. DDx includes hypovolemic hyponatremia   Na of 134 today  - renal following, f/u recs  - trend BMP, patient refusing labs       GI  #s/p colon resection with ostomy   - patient with colostomy bag s/p colectomy for pneumatosis in setting of toxic megacolon;   - monitor output.      ID  #UTI  Plan: UA grossly positive, patient c/o dysuria, UCx growing klebsiella  -f/u sensitivities  - c/w ceftriaxone 2g    Endo  #DM  Hx DM2. 07/2021 A1c 8.1%. Home meds Latesha Bustillos   - A1c 6.7  - mISS.    Heme  #Anemia  - no evidence of bleeding. likely in setting of CKD  -  iron panel: RON  - f/u b12 and folate  - c/w ferrous sulfate 325 mg Po daily      #Prophylaxis   F: none, HD  E: HD  N: diabetic and renal diet  Ppx: hep subq    Code Status: full code    Dispo: [facility]

## 2022-01-09 NOTE — PROGRESS NOTE ADULT - ASSESSMENT
62yo M hx of ?EtOH, cirrhosis, HTN, DM2 with neuropathy, hx of toxic megacolon c/b pneumatosis s/p colectomy and ileostomy presents with acute renal failure requiring emergent HD 1/6 and 1/7    Anuric MEG 2/2 to ATN vs paraproteinaemia vs ?toxic ingestion  No repeat labs today- pt has been refusing  Increased documented urine outpt signifying recovery  No need for dialysis at this time  Given vast improvement in mentation and acidosis- hypothesise that an unknown toxic metabolite was dialysed off  S/p self removal of HD cath on 1/7  F/u serum immunoelectropheresis- FLC of ~2  Baseline creat~ 1  Nl sized kidneys without echogenicity c/w acute process    On start IV iron 200mg x5days    Renal diet, avoid nephrotoxic meds, renally dose meds, strict I&Os 60yo M hx of ?EtOH, cirrhosis, HTN, DM2 with neuropathy, hx of toxic megacolon c/b pneumatosis s/p colectomy and ileostomy presents with acute renal failure requiring emergent HD 1/6 and 1/7    Now non oliguric MEG 2/2 to ATN vs paraproteinaemia vs ?toxic ingestion  No repeat labs today- pt has been refusing  Increased documented urine outpt signifying recovery  No need for dialysis at this time  Given vast improvement in mentation and acidosis- hypothesise that an unknown toxic metabolite was dialysed off  S/p self removal of HD cath on 1/7  F/u serum immunoelectropheresis- FLC of ~2  Baseline creat~ 1  Nl sized kidneys without echogenicity c/w acute process    On start IV iron 200mg x5days    Renal diet, avoid nephrotoxic meds, renally dose meds, strict I&Os

## 2022-01-09 NOTE — PROGRESS NOTE ADULT - SUBJECTIVE AND OBJECTIVE BOX
Patient is a 61y Male seen and evaluated at bedside. Started making urine. Now non oliguric- possible recovery underway. No urgent indication for dialysis at this time. Pt displays aberrant behaviours- was on observation with bedside PCA.     Meds:    amLODIPine   Tablet 5 every 24 hours  atorvastatin 10 at bedtime  cefTRIAXone   IVPB 2000 every 24 hours  dextrose 40% Gel 15 once  dextrose 5%. 1000 <Continuous>  dextrose 5%. 1000 <Continuous>  dextrose 50% Injectable 25 once  dextrose 50% Injectable 12.5 once  dextrose 50% Injectable 25 once  ferrous    sulfate 325 daily  glucagon  Injectable 1 once  heparin   Injectable 5000 every 8 hours  hydrALAZINE Injectable 10 once  insulin lispro (ADMELOG) corrective regimen sliding scale  Before meals and at bedtime  iron sucrose IVPB 200 every 24 hours  levothyroxine 50 daily  QUEtiapine 25 at bedtime  traZODone 100 at bedtime      T(C): , Max: 37.8 (01-08-22 @ 17:00)  T(F): , Max: 100.1 (01-08-22 @ 17:00)  HR: 94 (01-09-22 @ 08:30)  BP: 185/85 (01-09-22 @ 08:30)  BP(mean): 129 (01-09-22 @ 08:30)  RR: 20 (01-09-22 @ 08:30)  SpO2: 98% (01-09-22 @ 08:30)  Wt(kg): --    01-08 @ 07:01  -  01-09 @ 07:00  --------------------------------------------------------  IN: 0 mL / OUT: 775 mL / NET: -775 mL    Review of Systems:  RESPIRATORY: No shortness of breath  CARDIOVASCULAR: No chest pain   MUSCULOSKELETAL: No leg oedema    PHYSICAL EXAM:  GENERAL: Awake, alert, on RA  CHEST/LUNG: Bilateral clear breath sounds, no rales  HEART: Regular rate and rhythm, no murmur  ABDOMEN: Soft, nontender, non distended  EXTREMITIES: No pedal oedema  ACCESS:  none    LABS:                        10.6   9.34  )-----------( 225      ( 08 Jan 2022 16:27 )             31.0     01-08    134<L>  |  97  |  30<H>  ----------------------------<  217<H>  4.8   |  21<L>  |  3.62<H>    Ca    8.8      08 Jan 2022 16:27  Phos  4.1     01-08  Mg     1.8     01-08    TPro  8.1  /  Alb  3.7  /  TBili  <0.2  /  DBili  x   /  AST  9<L>  /  ALT  <5<L>  /  AlkPhos  75  01-08                RADIOLOGY & ADDITIONAL STUDIES:

## 2022-01-09 NOTE — PROGRESS NOTE ADULT - SUBJECTIVE AND OBJECTIVE BOX
INTERVAL HPI/OVERNIGHT EVENTS:    SUBJECTIVE: Patient seen and examined at bedside.     OBJECTIVE:    VITAL SIGNS:  ICU Vital Signs Last 24 Hrs  T(C): 36.9 (09 Jan 2022 06:00), Max: 37.8 (08 Jan 2022 17:00)  T(F): 98.4 (09 Jan 2022 06:00), Max: 100.1 (08 Jan 2022 17:00)  HR: 76 (09 Jan 2022 04:53) (76 - 102)  BP: 143/67 (09 Jan 2022 04:30) (130/77 - 194/93)  BP(mean): 96 (09 Jan 2022 04:30) (96 - 140)  ABP: --  ABP(mean): --  RR: 18 (09 Jan 2022 04:30) (18 - 18)  SpO2: 96% (09 Jan 2022 04:53) (94% - 99%)        01-08 @ 07:01  -  01-09 @ 07:00  --------------------------------------------------------  IN: 0 mL / OUT: 775 mL / NET: -775 mL      CAPILLARY BLOOD GLUCOSE      POCT Blood Glucose.: 198 mg/dL (09 Jan 2022 06:16)      PHYSICAL EXAM:    Constitutional: NAD, well-groomed, well-developed  HEENT: PERRLA, EOMI, Normal Hearing, MMM  Neck: No LAD, No JVD  Back: Normal spine flexure, No CVA tenderness  Respiratory: CTAB   Cardiovascular: S1 and S2, RRR, no M/G/R  Gastrointestinal: BS+, soft, NT/ND  Extremities: No peripheral edema  Vascular: 2+ peripheral pulses  Neurological: A/O x 3, no focal deficits  Psychiatric: Normal mood, normal affect  Musculoskeletal: 5/5 strength b/l upper and lower extremities  Skin: No rashes    MEDICATIONS:  MEDICATIONS  (STANDING):  amLODIPine   Tablet 5 milliGRAM(s) Oral every 24 hours  atorvastatin 10 milliGRAM(s) Oral at bedtime  cefTRIAXone   IVPB 2000 milliGRAM(s) IV Intermittent every 24 hours  dextrose 40% Gel 15 Gram(s) Oral once  dextrose 5%. 1000 milliLiter(s) (50 mL/Hr) IV Continuous <Continuous>  dextrose 5%. 1000 milliLiter(s) (100 mL/Hr) IV Continuous <Continuous>  dextrose 50% Injectable 25 Gram(s) IV Push once  dextrose 50% Injectable 12.5 Gram(s) IV Push once  dextrose 50% Injectable 25 Gram(s) IV Push once  ferrous    sulfate 325 milliGRAM(s) Oral daily  glucagon  Injectable 1 milliGRAM(s) IntraMuscular once  heparin   Injectable 5000 Unit(s) SubCutaneous every 8 hours  hydrALAZINE Injectable 10 milliGRAM(s) IV Push once  insulin lispro (ADMELOG) corrective regimen sliding scale   SubCutaneous Before meals and at bedtime  iron sucrose IVPB 200 milliGRAM(s) IV Intermittent every 24 hours  levothyroxine 50 MICROGram(s) Oral daily  QUEtiapine 25 milliGRAM(s) Oral at bedtime  traZODone 100 milliGRAM(s) Oral at bedtime    MEDICATIONS  (PRN):      ALLERGIES:  Allergies    No Known Allergies    Intolerances        LABS:                        10.6   9.34  )-----------( 225      ( 08 Jan 2022 16:27 )             31.0     01-08    134<L>  |  97  |  30<H>  ----------------------------<  217<H>  4.8   |  21<L>  |  3.62<H>    Ca    8.8      08 Jan 2022 16:27  Phos  4.1     01-08  Mg     1.8     01-08    TPro  8.1  /  Alb  3.7  /  TBili  <0.2  /  DBili  x   /  AST  9<L>  /  ALT  <5<L>  /  AlkPhos  75  01-08          RADIOLOGY & ADDITIONAL TESTS: Reviewed. INTERVAL HPI/OVERNIGHT EVENTS:    SUBJECTIVE: Patient seen and examined at bedside.     OBJECTIVE:    VITAL SIGNS:  ICU Vital Signs Last 24 Hrs  T(C): 36.9 (09 Jan 2022 06:00), Max: 37.8 (08 Jan 2022 17:00)  T(F): 98.4 (09 Jan 2022 06:00), Max: 100.1 (08 Jan 2022 17:00)  HR: 76 (09 Jan 2022 04:53) (76 - 102)  BP: 143/67 (09 Jan 2022 04:30) (130/77 - 194/93)  BP(mean): 96 (09 Jan 2022 04:30) (96 - 140)  ABP: --  ABP(mean): --  RR: 18 (09 Jan 2022 04:30) (18 - 18)  SpO2: 96% (09 Jan 2022 04:53) (94% - 99%)        01-08 @ 07:01  -  01-09 @ 07:00  --------------------------------------------------------  IN: 0 mL / OUT: 775 mL / NET: -775 mL      CAPILLARY BLOOD GLUCOSE      POCT Blood Glucose.: 198 mg/dL (09 Jan 2022 06:16)      PHYSICAL EXAM:    Constitutional: NAD, A&Ox3 but altered  HEENT:  MMM  Respiratory: CTA B/L; no W/R/R,   Cardiac: +S1/S2; tachycardic, regular rhythm; no M/R/G;   Gastrointestinal: soft, NT/ND; no rebound or guarding; +BSx4. Ostomy in place without signs of infectious, bag half full. no CVA tenderness  Extremities: WWP, R foot s/p amputation of 3-5th toes  Vascular: 2+ radial, DP/PT pulses B/L  Dermatologic: skin warm, dry and intact; no rashes, wounds, or scars. Ulcer @ right foot, healed over, no signs of infection  Neurologic: AAOx3 no focal deficits    MEDICATIONS:  MEDICATIONS  (STANDING):  amLODIPine   Tablet 5 milliGRAM(s) Oral every 24 hours  atorvastatin 10 milliGRAM(s) Oral at bedtime  cefTRIAXone   IVPB 2000 milliGRAM(s) IV Intermittent every 24 hours  dextrose 40% Gel 15 Gram(s) Oral once  dextrose 5%. 1000 milliLiter(s) (50 mL/Hr) IV Continuous <Continuous>  dextrose 5%. 1000 milliLiter(s) (100 mL/Hr) IV Continuous <Continuous>  dextrose 50% Injectable 25 Gram(s) IV Push once  dextrose 50% Injectable 12.5 Gram(s) IV Push once  dextrose 50% Injectable 25 Gram(s) IV Push once  ferrous    sulfate 325 milliGRAM(s) Oral daily  glucagon  Injectable 1 milliGRAM(s) IntraMuscular once  heparin   Injectable 5000 Unit(s) SubCutaneous every 8 hours  hydrALAZINE Injectable 10 milliGRAM(s) IV Push once  insulin lispro (ADMELOG) corrective regimen sliding scale   SubCutaneous Before meals and at bedtime  iron sucrose IVPB 200 milliGRAM(s) IV Intermittent every 24 hours  levothyroxine 50 MICROGram(s) Oral daily  QUEtiapine 25 milliGRAM(s) Oral at bedtime  traZODone 100 milliGRAM(s) Oral at bedtime    MEDICATIONS  (PRN):      ALLERGIES:  Allergies    No Known Allergies    Intolerances        LABS:                        10.6   9.34  )-----------( 225      ( 08 Jan 2022 16:27 )             31.0     01-08    134<L>  |  97  |  30<H>  ----------------------------<  217<H>  4.8   |  21<L>  |  3.62<H>    Ca    8.8      08 Jan 2022 16:27  Phos  4.1     01-08  Mg     1.8     01-08    TPro  8.1  /  Alb  3.7  /  TBili  <0.2  /  DBili  x   /  AST  9<L>  /  ALT  <5<L>  /  AlkPhos  75  01-08          RADIOLOGY & ADDITIONAL TESTS: Reviewed. INTERVAL HPI/OVERNIGHT EVENTS: /90, HR 92 - gave 10 labetolol IVP x1. SBP since in low 160s, HR 70-80. satting mid-90s on RA.    SUBJECTIVE: Patient seen and examined at bedside. Patient speaking in native language, altered but denies any concerns upon ROS.     OBJECTIVE:    VITAL SIGNS:  ICU Vital Signs Last 24 Hrs  T(C): 36.9 (09 Jan 2022 06:00), Max: 37.8 (08 Jan 2022 17:00)  T(F): 98.4 (09 Jan 2022 06:00), Max: 100.1 (08 Jan 2022 17:00)  HR: 76 (09 Jan 2022 04:53) (76 - 102)  BP: 143/67 (09 Jan 2022 04:30) (130/77 - 194/93)  BP(mean): 96 (09 Jan 2022 04:30) (96 - 140)  ABP: --  ABP(mean): --  RR: 18 (09 Jan 2022 04:30) (18 - 18)  SpO2: 96% (09 Jan 2022 04:53) (94% - 99%)        01-08 @ 07:01  -  01-09 @ 07:00  --------------------------------------------------------  IN: 0 mL / OUT: 775 mL / NET: -775 mL      CAPILLARY BLOOD GLUCOSE      POCT Blood Glucose.: 198 mg/dL (09 Jan 2022 06:16)      PHYSICAL EXAM:    Constitutional: NAD, A&Ox2 but altered and speaking in native language   HEENT:  MMM  Respiratory: CTA B/L; no W/R/R,   Cardiac: +S1/S2; tachycardic, regular rhythm; no M/R/G;   Gastrointestinal: soft, NT/ND; no rebound or guarding; +BSx4. Ostomy in place without signs of infectious, bag half full. no CVA tenderness  Extremities: WWP, R foot s/p amputation of 3-5th toes  Vascular: 2+ radial, DP/PT pulses B/L  Dermatologic: skin warm, dry and intact; no rashes, wounds, or scars. Ulcer @ right foot, healed over, no signs of infection  Neurologic: AAOx2, no focal deficits    MEDICATIONS:  MEDICATIONS  (STANDING):  amLODIPine   Tablet 5 milliGRAM(s) Oral every 24 hours  atorvastatin 10 milliGRAM(s) Oral at bedtime  cefTRIAXone   IVPB 2000 milliGRAM(s) IV Intermittent every 24 hours  dextrose 40% Gel 15 Gram(s) Oral once  dextrose 5%. 1000 milliLiter(s) (50 mL/Hr) IV Continuous <Continuous>  dextrose 5%. 1000 milliLiter(s) (100 mL/Hr) IV Continuous <Continuous>  dextrose 50% Injectable 25 Gram(s) IV Push once  dextrose 50% Injectable 12.5 Gram(s) IV Push once  dextrose 50% Injectable 25 Gram(s) IV Push once  ferrous    sulfate 325 milliGRAM(s) Oral daily  glucagon  Injectable 1 milliGRAM(s) IntraMuscular once  heparin   Injectable 5000 Unit(s) SubCutaneous every 8 hours  hydrALAZINE Injectable 10 milliGRAM(s) IV Push once  insulin lispro (ADMELOG) corrective regimen sliding scale   SubCutaneous Before meals and at bedtime  iron sucrose IVPB 200 milliGRAM(s) IV Intermittent every 24 hours  levothyroxine 50 MICROGram(s) Oral daily  QUEtiapine 25 milliGRAM(s) Oral at bedtime  traZODone 100 milliGRAM(s) Oral at bedtime    MEDICATIONS  (PRN):      ALLERGIES:  Allergies    No Known Allergies    Intolerances        LABS:                        10.6   9.34  )-----------( 225      ( 08 Jan 2022 16:27 )             31.0     01-08    134<L>  |  97  |  30<H>  ----------------------------<  217<H>  4.8   |  21<L>  |  3.62<H>    Ca    8.8      08 Jan 2022 16:27  Phos  4.1     01-08  Mg     1.8     01-08    TPro  8.1  /  Alb  3.7  /  TBili  <0.2  /  DBili  x   /  AST  9<L>  /  ALT  <5<L>  /  AlkPhos  75  01-08          RADIOLOGY & ADDITIONAL TESTS: Reviewed.

## 2022-01-09 NOTE — PROGRESS NOTE ADULT - ATTENDING COMMENTS
I: HTN controlled. Last dialyzed 2d ago. HGB 10.7. Stable creatinine. Mental status improved.   A: Anemia stable. Stable MEG. Encephalopathy improved.   P: No indications for dialysis. Continue BP meds. Follow SCr.

## 2022-01-10 DIAGNOSIS — D64.9 ANEMIA, UNSPECIFIED: ICD-10-CM

## 2022-01-10 DIAGNOSIS — R41.82 ALTERED MENTAL STATUS, UNSPECIFIED: ICD-10-CM

## 2022-01-10 LAB
ALBUMIN SERPL ELPH-MCNC: 3.7 G/DL — SIGNIFICANT CHANGE UP (ref 3.3–5)
ALP SERPL-CCNC: 65 U/L — SIGNIFICANT CHANGE UP (ref 40–120)
ALT FLD-CCNC: <5 U/L — LOW (ref 10–45)
ANION GAP SERPL CALC-SCNC: 15 MMOL/L — SIGNIFICANT CHANGE UP (ref 5–17)
AST SERPL-CCNC: 10 U/L — SIGNIFICANT CHANGE UP (ref 10–40)
BILIRUB SERPL-MCNC: 0.2 MG/DL — SIGNIFICANT CHANGE UP (ref 0.2–1.2)
BUN SERPL-MCNC: 38 MG/DL — HIGH (ref 7–23)
CALCIUM SERPL-MCNC: 9.3 MG/DL — SIGNIFICANT CHANGE UP (ref 8.4–10.5)
CHLORIDE SERPL-SCNC: 99 MMOL/L — SIGNIFICANT CHANGE UP (ref 96–108)
CO2 SERPL-SCNC: 22 MMOL/L — SIGNIFICANT CHANGE UP (ref 22–31)
CREAT SERPL-MCNC: 2.95 MG/DL — HIGH (ref 0.5–1.3)
GLUCOSE BLDC GLUCOMTR-MCNC: 144 MG/DL — HIGH (ref 70–99)
GLUCOSE BLDC GLUCOMTR-MCNC: 195 MG/DL — HIGH (ref 70–99)
GLUCOSE BLDC GLUCOMTR-MCNC: 200 MG/DL — HIGH (ref 70–99)
GLUCOSE BLDC GLUCOMTR-MCNC: 203 MG/DL — HIGH (ref 70–99)
GLUCOSE SERPL-MCNC: 207 MG/DL — HIGH (ref 70–99)
HCT VFR BLD CALC: 26.9 % — LOW (ref 39–50)
HGB BLD-MCNC: 8.4 G/DL — LOW (ref 13–17)
MAGNESIUM SERPL-MCNC: 1.8 MG/DL — SIGNIFICANT CHANGE UP (ref 1.6–2.6)
MCHC RBC-ENTMCNC: 29 PG — SIGNIFICANT CHANGE UP (ref 27–34)
MCHC RBC-ENTMCNC: 31.2 GM/DL — LOW (ref 32–36)
MCV RBC AUTO: 92.8 FL — SIGNIFICANT CHANGE UP (ref 80–100)
NRBC # BLD: 0 /100 WBCS — SIGNIFICANT CHANGE UP (ref 0–0)
PHOSPHATE SERPL-MCNC: 4.7 MG/DL — HIGH (ref 2.5–4.5)
PLATELET # BLD AUTO: 181 K/UL — SIGNIFICANT CHANGE UP (ref 150–400)
POTASSIUM SERPL-MCNC: 3.8 MMOL/L — SIGNIFICANT CHANGE UP (ref 3.5–5.3)
POTASSIUM SERPL-SCNC: 3.8 MMOL/L — SIGNIFICANT CHANGE UP (ref 3.5–5.3)
PROT SERPL-MCNC: 7.8 G/DL — SIGNIFICANT CHANGE UP (ref 6–8.3)
RBC # BLD: 2.9 M/UL — LOW (ref 4.2–5.8)
RBC # FLD: 13.5 % — SIGNIFICANT CHANGE UP (ref 10.3–14.5)
SODIUM SERPL-SCNC: 136 MMOL/L — SIGNIFICANT CHANGE UP (ref 135–145)
T PALLIDUM AB TITR SER: NEGATIVE — SIGNIFICANT CHANGE UP
WBC # BLD: 6.71 K/UL — SIGNIFICANT CHANGE UP (ref 3.8–10.5)
WBC # FLD AUTO: 6.71 K/UL — SIGNIFICANT CHANGE UP (ref 3.8–10.5)

## 2022-01-10 PROCEDURE — 99233 SBSQ HOSP IP/OBS HIGH 50: CPT | Mod: GC

## 2022-01-10 PROCEDURE — 99231 SBSQ HOSP IP/OBS SF/LOW 25: CPT

## 2022-01-10 RX ORDER — HALOPERIDOL DECANOATE 100 MG/ML
5 INJECTION INTRAMUSCULAR ONCE
Refills: 0 | Status: COMPLETED | OUTPATIENT
Start: 2022-01-10 | End: 2022-01-11

## 2022-01-10 RX ORDER — NICOTINE POLACRILEX 2 MG
1 GUM BUCCAL DAILY
Refills: 0 | Status: DISCONTINUED | OUTPATIENT
Start: 2022-01-10 | End: 2022-01-19

## 2022-01-10 RX ORDER — POTASSIUM CHLORIDE 20 MEQ
20 PACKET (EA) ORAL ONCE
Refills: 0 | Status: DISCONTINUED | OUTPATIENT
Start: 2022-01-10 | End: 2022-01-10

## 2022-01-10 RX ORDER — MAGNESIUM SULFATE 500 MG/ML
1 VIAL (ML) INJECTION ONCE
Refills: 0 | Status: DISCONTINUED | OUTPATIENT
Start: 2022-01-10 | End: 2022-01-10

## 2022-01-10 RX ORDER — INSULIN GLARGINE 100 [IU]/ML
3 INJECTION, SOLUTION SUBCUTANEOUS AT BEDTIME
Refills: 0 | Status: DISCONTINUED | OUTPATIENT
Start: 2022-01-10 | End: 2022-01-19

## 2022-01-10 RX ADMIN — CEFTRIAXONE 100 MILLIGRAM(S): 500 INJECTION, POWDER, FOR SOLUTION INTRAMUSCULAR; INTRAVENOUS at 14:19

## 2022-01-10 RX ADMIN — Medication 1: at 17:16

## 2022-01-10 RX ADMIN — Medication 1 PATCH: at 18:21

## 2022-01-10 RX ADMIN — Medication 2: at 11:57

## 2022-01-10 RX ADMIN — AMLODIPINE BESYLATE 10 MILLIGRAM(S): 2.5 TABLET ORAL at 12:08

## 2022-01-10 RX ADMIN — HEPARIN SODIUM 5000 UNIT(S): 5000 INJECTION INTRAVENOUS; SUBCUTANEOUS at 05:48

## 2022-01-10 RX ADMIN — Medication 50 MICROGRAM(S): at 05:48

## 2022-01-10 RX ADMIN — HEPARIN SODIUM 5000 UNIT(S): 5000 INJECTION INTRAVENOUS; SUBCUTANEOUS at 14:18

## 2022-01-10 RX ADMIN — Medication 25 MILLIGRAM(S): at 08:26

## 2022-01-10 RX ADMIN — IRON SUCROSE 110 MILLIGRAM(S): 20 INJECTION, SOLUTION INTRAVENOUS at 22:54

## 2022-01-10 RX ADMIN — Medication 1 PATCH: at 11:58

## 2022-01-10 RX ADMIN — Medication 1: at 22:41

## 2022-01-10 RX ADMIN — ATORVASTATIN CALCIUM 10 MILLIGRAM(S): 80 TABLET, FILM COATED ORAL at 21:06

## 2022-01-10 RX ADMIN — HEPARIN SODIUM 5000 UNIT(S): 5000 INJECTION INTRAVENOUS; SUBCUTANEOUS at 21:05

## 2022-01-10 RX ADMIN — QUETIAPINE FUMARATE 25 MILLIGRAM(S): 200 TABLET, FILM COATED ORAL at 21:06

## 2022-01-10 RX ADMIN — Medication 100 MILLIGRAM(S): at 21:05

## 2022-01-10 RX ADMIN — Medication 25 MILLIGRAM(S): at 15:21

## 2022-01-10 NOTE — CHART NOTE - NSCHARTNOTEFT_GEN_A_CORE
Spoke with friend Bryankevin Gabriella ([facility], [phone])--sees patient 2x/week  PMHx: DM with neuropathy, depression  PSHx: head surgery s/p fall (2005), colostomy  Medications: 15-16 meds  Has gone to [facility] many times--call for records  PCP: Dr. George Pham (Endocrinology), Dr. Jay Syed ([place], [phone])    Living: [facility] ([address]) Spoke with friend Varsha Quiroz ([facility], [phone])--sees patient 2x/week on a daily basis.  Stated the following about the patient:  PMHx: DM with neuropathy, depression  PSHx: head surgery s/p fall (2005), colostomy  Medications: 15-16 meds, unknwon which ones.  Has gone to [facility] many times--call for records  PCP: Dr. George Pham (Endocrinology), Dr. Jay Syed ([place], [phone])--Gabriella recommended we call both for collateral  Living: [facility] shelter ([address])

## 2022-01-10 NOTE — PROGRESS NOTE ADULT - PROBLEM SELECTOR PLAN 3
Hx of HTN, home med of amlodipine and lisninopril initially held with concern for sepsis. Once stable, started on amlodipine 5, had multiple episodes of elevated BP requiring IV meds, first given labetalol, then hydral. Now on hydral 25 q8  -c/w hydral 25 q8  -c/w amlodipine 10 qd

## 2022-01-10 NOTE — PROGRESS NOTE ADULT - SUBJECTIVE AND OBJECTIVE BOX
~~stepdown from [facility]la to [facility]~~  Hospital Course:  62 y/o M HTN, DM complicated by neuropathy, EtOH liver cirrhosis, hx IVDU, toxic megacolon c/b pneumatosis s/p colectomy/ileostomy who presents with generalized weakness x 3 days found to have MEG, hyponatremia, hyperkalemia admitted for management and treatment. Repeat labs today showed that potassium was increasing again, up to 7.6, decision made for emergent dialysis. Patient altered and moving around, ativan 1mg x2 given. Line was placed but patient became michelle to 30s, rapid response was called as patient also not responsive. Gave patient flumazenil, calcium chloride (see rapid response notes for more details), and patient became alert, still disoriented. Decision made to step patient up to MICU for closer monitoring overnight. In MICU, patient underwent dialysis, mental status improving but still altered, stable for stepdown to floors. Last dialysis session on 1/7, that evening patient ripped out HD cath, required suture removal kit to remove what was left, patient likely had air embolus (tachy with hypoxia), resolved that evening. Since being on telemetry, patient has been becoming hypertensive, was started on amlodipine 5, now increased to 10, and also hydral 25 q8, pressures still a bit labile. His mental status has fluctuated the last 2-3 days, he is A&Ox3 but a bit delirious, sometimes starting to talk in Amharic, or saying that he lives in the hospital. Patient stable for stepdown to [facility], will require collateral about baseline mental status and discharge planning, apparently he lives in shelter on [address]. Of note, patient has just started to have spontaneous voids on his own, now making good urine.    INTERVAL HPI/OVERNIGHT EVENTS:  Patient was seen and examined at bedside. Overnight, tobacco was found in his room, confiscated, started on nicotine patch. No acute complaints, denies HA, CP, palpitations, SOB, cough, N/V/D/C, abdominal pain.    VITAL SIGNS:  T(F): 98.3 (01-10-22 @ 09:57)  HR: 62 (01-10-22 @ 08:21)  BP: 155/65 (01-10-22 @ 08:21)  RR: 18 (01-10-22 @ 08:21)  SpO2: 93% (01-10-22 @ 08:21)  Wt(kg): --    PHYSICAL EXAM:    onstitutional: NAD, A&Ox3 but a bit delirious and intermittently speaking in Amharic   HEENT:  MMM  Respiratory: CTA B/L; no W/R/R,   Cardiac: +S1/S2;RRR no M/R/G;   Gastrointestinal: soft, NT/ND; no rebound or guarding; +BSx4. Ostomy in place without signs of infectious. no CVA tenderness  Extremities: WWP, R foot s/p amputation of 3-5th toes  Vascular: 2+ radial, DP/PT pulses B/L  Dermatologic: skin warm, dry and intact; no rashes, wounds, or scars. Ulcer @ right foot, healed over, no signs of infection  Neurologic: AAOx2, no focal deficits    MEDICATIONS  (STANDING):  amLODIPine   Tablet 10 milliGRAM(s) Oral every 24 hours  atorvastatin 10 milliGRAM(s) Oral at bedtime  cefTRIAXone   IVPB 2000 milliGRAM(s) IV Intermittent every 24 hours  dextrose 40% Gel 15 Gram(s) Oral once  dextrose 5%. 1000 milliLiter(s) (50 mL/Hr) IV Continuous <Continuous>  dextrose 5%. 1000 milliLiter(s) (100 mL/Hr) IV Continuous <Continuous>  dextrose 50% Injectable 25 Gram(s) IV Push once  dextrose 50% Injectable 12.5 Gram(s) IV Push once  dextrose 50% Injectable 25 Gram(s) IV Push once  glucagon  Injectable 1 milliGRAM(s) IntraMuscular once  heparin   Injectable 5000 Unit(s) SubCutaneous every 8 hours  hydrALAZINE 25 milliGRAM(s) Oral every 8 hours  insulin lispro (ADMELOG) corrective regimen sliding scale   SubCutaneous Before meals and at bedtime  iron sucrose IVPB 200 milliGRAM(s) IV Intermittent every 24 hours  levothyroxine 50 MICROGram(s) Oral daily  nicotine -  14 mG/24Hr(s) Patch 1 patch Transdermal daily  QUEtiapine 25 milliGRAM(s) Oral at bedtime  traZODone 100 milliGRAM(s) Oral at bedtime    MEDICATIONS  (PRN):  hydrALAZINE Injectable 10 milliGRAM(s) IV Push every 4 hours PRN systolic BP > 180      Allergies    No Known Allergies    Intolerances        LABS:                        8.4    6.71  )-----------( 181      ( 10 Ronnie 2022 11:28 )             26.9     01-09    133<L>  |  97  |  38<H>  ----------------------------<  206<H>  4.4   |  20<L>  |  3.32<H>    Ca    9.1      09 Jan 2022 16:48  Phos  5.0     01-09  Mg     1.9     01-09    TPro  8.1  /  Alb  3.9  /  TBili  0.2  /  DBili  x   /  AST  9<L>  /  ALT  <5<L>  /  AlkPhos  72  01-09          RADIOLOGY & ADDITIONAL TESTS:  Reviewed

## 2022-01-10 NOTE — PROGRESS NOTE ADULT - ASSESSMENT
62yo M hx of ?EtOH, cirrhosis, HTN, DM2 with neuropathy, hx of toxic megacolon c/b pneumatosis s/p colectomy and ileostomy presents with acute renal failure requiring emergent HD 1/6 and 1/7    Now non oliguric MEG 2/2 to ATN vs paraproteinaemia vs ?toxic ingestion  No repeat labs today- pt has been refusing  Increased documented urine outpt signifying recovery  No need for dialysis at this time  Given vast improvement in mentation and acidosis- hypothesis that an unknown toxic metabolite was dialysed off  S/p self removal of HD cath on 1/7  F/u serum immunoelectropheresis- FLC of ~2  Baseline creat~ 1  Nl sized kidneys without echogenicity c/w acute process    On start IV iron 200mg x5days    Renal diet, avoid nephrotoxic meds, renally dose meds, strict I&Os     Thank you for the opportunity to participate in the care of your patient. The nephrology service remains available to assist with any questions or concerns. Please feel free to reach us by paging the on-call nephrology fellow for urgent issues or as below.     Vinny Chaney M.D.   PGY-5, Nephrology Fellow   C: [phone]   P: [phone]

## 2022-01-10 NOTE — PROGRESS NOTE ADULT - PROBLEM SELECTOR PLAN 4
UA grossly positive, patient c/o dysuria, UCx growing klebsiella sensitive to ceftriaxone  - c/w ceftriaxone 2g UA grossly positive, patient c/o dysuria, UCx growing klebsiella sensitive to ceftriaxone  - c/w ceftriaxone 2g (1/7-1/11)

## 2022-01-10 NOTE — PHYSICAL THERAPY INITIAL EVALUATION ADULT - PERTINENT HX OF CURRENT PROBLEM, REHAB EVAL
Pt. is a 61 y.o male with h/o liver cirrhosis  currently p/w AMS and gen weakness, admitted for further management of MEG /electrolyte abnormalities necessitating  emergent HD x2 sessions.

## 2022-01-10 NOTE — PROGRESS NOTE ADULT - PROBLEM SELECTOR PLAN 8
no evidence of bleeding. likely in setting of CKD  - iron panel: RON  - f/u b12 and folate  - c/w ferrous sulfate 325 mg Po daily

## 2022-01-10 NOTE — CONSULT NOTE ADULT - ASSESSMENT
Assessment/Plan:   62yo M hx of ?EtOH, cirrhosis, HTN, DM2 with neuropathy, hx of toxic megacolon c/b pneumatosis s/p colectomy and ileostomy presents with acute renal failure. Renal consulted.     Anuric MEG 2/2 to ATN vs paraproteinaemia vs ?toxic ingestion  Hyperkalaemia, mild hyponatraemia  Non anion gap metabolic acidosis- f/u ABG    Failed fluid and lasix challenge- remains anuric with 0cc urine in bladder with soliz  Pt will need emergent dialysis- pt altered and his emergency contact did not want to make the decision    Encephalopathy, but no osmolar gap- making ingestion less likely, but not entirely ruled out  Check serum immunoelectropheresis given protein gap >6; pt may require kidney biopsy once stabilised  Medically manage hyperkalaemia until HD inititiated- lokelma 10g q8h, Nbicarb pushes, Cagluconate IV,     Baseline creat~ 1  Get renal bladder sono    Renal diet, avoid nephrotoxic meds, renally dose meds, strict I&Os    Thank you for the opportunity to participate in the care of your patient. The nephrology service remains available to assist with any questions or concerns. Please feel free to reach us by paging the on-call nephrology fellow for urgent issues or as below.     Staci Clifton M.D.   PGY-5  Pager: [phone]
per Internal Medicine    62 y/o M HTN, DM complicated by neuropathy, EtOH liver cirrhosis, hx IVDU, toxic megacolon c/b pneumatosis s/p colectomy/ileostomy who presents with generalized weakness x 3 days found to have MEG, hyponatremia, hyperkalemia admitted for management and treatment.    Neuro  #AMS  Mental status significantly improved today after dialysis. Likely 2/2 ingestion, UTI could be contributing  -complete workup for AMS (VRDL, TSH etc)    Pulmonary  - no acute issues     Cardiovascular  #Bradycardia  - pt with episodes of bradycardia. Cardiology following and likely in setting of electrolyte derangements 2/2 to renal failure  - s/p atropine 0.5 mg x2   - started on dopamine gtt while receiving HD, now off pressors  - HR actually tachy today  - TTE unremarkable    #HTN  Hypertension.   - Home meds lisinopril, amlodipine  - started on amlodipine 10 mg daily  - started on labetalol 10 mg IV q 6hrs if SBP above 180    #HLD  - c/w home atorvastatin 10 mg QD    Renal   #MEG on CKD  -  MEG vs new onset CKD. Admission Cr 5.95, baseline Cr 1.7-1.8. AMS, urine w/ gross pus, appears euvolemic otherwise. s/p 3L NS.   - unclear etiology, ATN vs paraproteinemia vs toxic ingestion?  Creat as high as 6.52. as low as 2.87 yesterday, back up to 3.62 today  - s/p emergent dialysis on 1/6, and again on 1/7  - K on admission was 8.0, now 4.8 today  - renal following, appreciate recs  - monitor I&O  - BS q6hrs   - avoid nephrotoxic agents  - hold home cymbalta and baclofen  - F/u serum immunoelectropheresis- FLC of ~2  - renal US: Nl sized kidneys without echogenicity c/w acute process  - S/p self removal of HD cath on 1/7, making urine likely recovering as per Nephro no need for emergent HD   - trend BMP, patient refusing labs     #hyperkalemia  Potassium as high as 8, w/ peaked T waves, was labile yesterday, patient underwent emergent dialysis  Today potassium 4.8  - trend BMP, patient refusing labs       #Hyponatremia  Admission Na 119. S/p 3L NS in the ED. Admitted 6 months ago for same electrolyte abnormalities. Etiology unclear. DDx includes hypovolemic hyponatremia   Na of 134 today  - renal following, f/u recs  - trend BMP, patient refusing labs       GI  #s/p colon resection with ostomy   - patient with colostomy bag s/p colectomy for pneumatosis in setting of toxic megacolon;   - monitor output.      ID  #UTI  Plan: UA grossly positive, patient c/o dysuria, UCx growing klebsiella  -f/u sensitivities  - c/w ceftriaxone 2g    Endo  #DM  Hx DM2. 07/2021 A1c 8.1%. Home meds Latesha Bustillos   - A1c 6.7  - mISS.    Heme  #Anemia  - no evidence of bleeding. likely in setting of CKD  -  iron panel: RON  - f/u b12 and folate  - c/w ferrous sulfate 325 mg Po daily      #Prophylaxis   F: none, HD  E: HD  N: diabetic and renal diet  Ppx: hep subq    Code Status: full code    Dispo:  [facility] 
61 year old man, w/ a PMHx of cirrhosis 2/2 EtOH, HTN, DM2 with neuropathy, toxic megacolon (c/b pneumatosis s/p colectomy and ileostomy), recent admission 6 months ago for hyperkalemia and hyponatremia, p/w  generalized weakness and lightheadedness; found to be in ARF requiring HD. Cardiology consulted for bradycardia.     Sinus Bradycardia  Middle aged man, w/ a hx of cirrhosis, HTN, and DM2 w/ neuropathy, now w/ sinus bradycardia and sinus arrhythmia in the setting of ARF and gross electrolyte abnormalities.  Patient unable to give clear hx, but denies CP, SOB, palpitations, and dizziness. Patient euvolemic on exam. Labs w/ gross electrolyte abnormalities (as above)EKG w/ sinus bradycardia at 54 bpm, sinus arrhythmia, and peaked T waves. TTE w/in the last year w/ preserved systolic function. Therefore, bradycardia 2/2 electrolyte disturbances.  - electrolytes correction as per primary service and Nephrology   - hold AV gila blocking agents  - obtain daily EKG   - continue telemetry monitoring     Attending attestation to follow. Please reconsult as necessary.

## 2022-01-10 NOTE — PROGRESS NOTE ADULT - PROBLEM SELECTOR PLAN 6
Hx DM2. 07/2021 A1c 8.1%. Home meds Latesha Bustillos  Sugar a bit high yesterday and today  -redose lantus tonight based on sliding scal  - A1c 6.7  - mISS.

## 2022-01-10 NOTE — PROGRESS NOTE ADULT - SUBJECTIVE AND OBJECTIVE BOX
Patient is a 61y Male seen and evaluated at bedside.   UOP ~1L/24h  BP is acceptable   Cr is downtrending     Meds:    amLODIPine   Tablet 10 every 24 hours  atorvastatin 10 at bedtime  cefTRIAXone   IVPB 2000 every 24 hours  dextrose 40% Gel 15 once  dextrose 5%. 1000 <Continuous>  dextrose 5%. 1000 <Continuous>  dextrose 50% Injectable 25 once  dextrose 50% Injectable 12.5 once  dextrose 50% Injectable 25 once  glucagon  Injectable 1 once  heparin   Injectable 5000 every 8 hours  hydrALAZINE 25 every 8 hours  hydrALAZINE Injectable 10 every 4 hours PRN  insulin lispro (ADMELOG) corrective regimen sliding scale  Before meals and at bedtime  iron sucrose IVPB 200 every 24 hours  levothyroxine 50 daily  nicotine -  14 mG/24Hr(s) Patch 1 daily  QUEtiapine 25 at bedtime  traZODone 100 at bedtime      T(C): , Max: 37.1 (01-10-22 @ 06:36)  T(F): , Max: 98.7 (01-10-22 @ 06:36)  HR: 62 (01-10-22 @ 08:21)  BP: 155/65 (01-10-22 @ 08:21)  BP(mean): 94 (01-10-22 @ 08:21)  RR: 18 (01-10-22 @ 08:21)  SpO2: 93% (01-10-22 @ 08:21)  Wt(kg): --    01-09 @ 07:01  -  01-10 @ 07:00  --------------------------------------------------------  IN: 0 mL / OUT: 1350 mL / NET: -1350 mL    01-10 @ 07:01  -  01-10 @ 09:41  --------------------------------------------------------  IN: 0 mL / OUT: 600 mL / NET: -600 mL        Review of Systems:  RESPIRATORY: No shortness of breath  CARDIOVASCULAR: No chest pain   MUSCULOSKELETAL: No leg edema    PHYSICAL EXAM:  GENERAL: Awake, alert, on RA  CHEST/LUNG: Bilateral clear breath sounds, no rales  HEART: Regular rate and rhythm, no murmur  ABDOMEN: Soft, nontender, non distended  EXTREMITIES: No pedal edema  ACCESS:  none      LABS:                        9.4    7.83  )-----------( 218      ( 09 Jan 2022 16:48 )             28.3     01-09    133<L>  |  97  |  38<H>  ----------------------------<  206<H>  4.4   |  20<L>  |  3.32<H>    Ca    9.1      09 Jan 2022 16:48  Phos  5.0     01-09  Mg     1.9     01-09    TPro  8.1  /  Alb  3.9  /  TBili  0.2  /  DBili  x   /  AST  9<L>  /  ALT  <5<L>  /  AlkPhos  72  01-09                RADIOLOGY & ADDITIONAL STUDIES:

## 2022-01-10 NOTE — PROGRESS NOTE ADULT - PROBLEM SELECTOR PLAN 2
MEG vs new onset CKD. Admission Cr 5.95, baseline Cr 1.7-1.8. AMS, urine w/ gross pus, appears euvolemic otherwise.  unclear etiology, ATN vs paraproteinemia vs toxic ingestion vs retention. Creat as high as 6.52. as low as 2.87, had gone back up, now downtrending to 2.95 today. Had been anuric requiring straight cath, last night began making spontaneous urine. s/p emergent dialysis on 1/6, and again on 1/7  - renal following, appreciate recs  - c/w bladder scan q6  - hold home cymbalta and baclofen  - renal US: Nl sized kidneys without echogenicity c/w acute process  - S/p self removal of HD cath on 1/7, making urine likely recovering as per Nephro no need for emergent HD   - trend BMP, patient refusing labs intermittently  - monitor I&O  - avoid nephrotoxic agents    #hyperkalemia  Potassium as high as 8, w/ peaked T waves, patient underwent emergent dialysis, now stable at 3.9  RESOLVED    #Hyponatremia  Admission Na 119. S/p 3L NS in the ED. Admitted 6 months ago for same electrolyte abnormalities. Etiology unclear. DDx includes hypovolemic hyponatremia. Na stable last few days, 136 todaty   RESOLVED

## 2022-01-10 NOTE — PROGRESS NOTE ADULT - PROBLEM SELECTOR PLAN 4
UA grossly positive, patient c/o dysuria, UCx growing klebsiella sensitive to ceftriaxone  - c/w ceftriaxone 2g

## 2022-01-10 NOTE — PROGRESS NOTE ADULT - ATTENDING COMMENTS
62 yo M with DM, depression, who presented initially in acute renal failure with AMS, required emergent HD x 2 with rapid recovery in renal function, now with nonoliguric MEG that is improving. Continued to have some altered mental status - pulled at HD catheter and broke tubing 3 days ago, says it was because it irritated him. He can give some nonsensical answers, but is fully alert and oriented. High suspicion for ingestion of toxin given his rapid renal recovery after only 2 HD sessions, but no prior utox obtained. Unclear if this is now patient's baseline mental status, but overall has remained hemodynamically stable, BP improved on anti hypertensive regimen, HR improved, electrolytes stable and no further plan for HD. Will need to obtain collateral re: patient's baseline functional status, determine safe d/c plan, SW consultation. Stable for transfer to [facility]. 62 yo M with DM, depression, who presented initially in acute renal failure with AMS, required emergent HD x 2 with rapid recovery in renal function, now with nonoliguric MEG that is improving. Continued to have some altered mental status - pulled at HD catheter and broke tubing 3 days ago, says it was because it irritated him. He can give some nonsensical answers, but is fully alert and oriented. High suspicion for ingestion of toxin given his rapid renal recovery after only 2 HD sessions, but no prior utox obtained. Required ativan 1mg x 2 on day of admission, but no other signs or sx of ETOH withdrawal. Mental status slowly improving. Unclear if this is now patient's baseline mental status, but overall has remained hemodynamically stable, BP improved on anti hypertensive regimen, HR improved, electrolytes stable and no further plan for HD. Will need to obtain collateral re: patient's baseline functional status, determine safe d/c plan, SW consultation. [facility]

## 2022-01-10 NOTE — PROGRESS NOTE ADULT - PROBLEM SELECTOR PLAN 8
no evidence of bleeding. likely in setting of CKD  - iron panel: RON  - f/u b12 and folate  - c/w ferrous sulfate 325 mg Po daily  -continue to monitor for signs of bleeding

## 2022-01-10 NOTE — PROGRESS NOTE ADULT - PROBLEM SELECTOR PLAN 5
pt with episodes of bradycardia. Cardiology following and likely in setting of electrolyte derangements 2/2 to renal failure. s/p atropine 0.5 mg x2.  started on dopamine gtt while receiving HD, now off pressors HR actually stable last few days  - TTE unremarkable  - cards following, appreciate recs

## 2022-01-10 NOTE — PROGRESS NOTE ADULT - ATTENDING COMMENTS
Mr. Wilkerson is a 61M who is admitting to our service now for altered mental status. He has a history of ETOH abuse with associated Liver Cirrhosis. I agree with above plan - recommend adding ammonia to labs. Consider EEG for initial etiology of AMS given "lip smacking" and what sounds like a possible post-ictal state. SW/CM assisting with dispo planning.

## 2022-01-10 NOTE — PROGRESS NOTE ADULT - PROBLEM SELECTOR PLAN 6
Hx DM2. 07/2021 A1c 8.1%. Home meds Latesha Bustillos  - A1c 6.7  -required 7u of sliding scale in the past 24 hours  - mISS. Hx DM2. 07/2021 A1c 8.1%. Home meds Latesha Bustillos  - A1c 6.7  -required 7u of sliding scale in the past 24 hours  - mISS Hx DM2. 07/2021 A1c 8.1%. Home meds Latesha Bustillos  - A1c 6.7  -Started on Lantus 3u  - mISS  -continue to monitor blood glucose

## 2022-01-10 NOTE — PROGRESS NOTE ADULT - PROBLEM SELECTOR PLAN 7
#s/p colon resection with ostomy   - patient with colostomy bag s/p colectomy for pneumatosis in setting of toxic megacolon;   - monitor output.

## 2022-01-10 NOTE — PROGRESS NOTE ADULT - PROBLEM SELECTOR PLAN 2
MEG vs new onset CKD. Admission Cr 5.95, baseline Cr 1.7-1.8. AMS, urine w/ gross pus, appears euvolemic otherwise.  unclear etiology, ATN vs paraproteinemia vs toxic ingestion vs retention. Creat as high as 6.52. as low as 2.87, had gone back up, now downtrending to 2.95 today   s/p emergent dialysis on 1/6, and again on 1/7  --initially anuric, now making urine (approximately 600cc this AM).  - renal following, appreciate recs  - renal US: Nl sized kidneys without echogenicity c/w acute process  - S/p self removal of HD cath on 1/7, making urine likely recovering as per Nephro no need for further emergent HD  - c/w bladder scan q6  - hold home cymbalta and baclofen   - trend BMP, patient refusing labs intermittently  - monitor I&O  - avoid nephrotoxic agents    #hyperkalemia  Potassium as high as 8, w/ peaked T waves, patient underwent emergent dialysis, now stable at 3.9  RESOLVED    #Hyponatremia  Admission Na 119. S/p 3L NS in the ED. Admitted 6 months ago for same electrolyte abnormalities. Etiology unclear. DDx includes hypovolemic hyponatremia. Na stable last few days, 136 today  RESOLVED MEG vs new onset CKD. Admission Cr 5.95, baseline Cr 1.7-1.8. AMS, urine w/ gross pus, appears euvolemic otherwise.  unclear etiology, ATN vs paraproteinemia vs toxic ingestion vs retention. Creat as high as 6.52. as low as 2.87, had gone back up, now downtrending to 2.95 today   s/p emergent dialysis on 1/6, and again on 1/7  --initially anuric, now making urine (approximately 600cc this AM).  - renal US: Nl sized kidneys without echogenicity c/w acute process  - S/p self removal of HD cath on 1/7, making urine likely recovering as per Nephro no need for further emergent HD  - c/w bladder scan q6  - hold home cymbalta and baclofen   - trend BMP, patient refusing labs intermittently  - monitor I&O  - avoid nephrotoxic agents  - pending further recommendations from Nephro    #hyperkalemia  Potassium as high as 8, w/ peaked T waves, patient underwent emergent dialysis, now stable at 3.9  RESOLVED    #Hyponatremia  Admission Na 119. S/p 3L NS in the ED. Admitted 6 months ago for same electrolyte abnormalities. Etiology unclear. DDx includes hypovolemic hyponatremia. Na stable last few days, 136 today  RESOLVED

## 2022-01-10 NOTE — PROGRESS NOTE ADULT - PROBLEM SELECTOR PLAN 1
On admission, patient significantly altered, with repetitive lip smacking and unable to participate in history. Mental status improved after dialysis, however patient still a bit confused. Able to state name and year, does not know location ([place] knows [place] is president  -Intermittently speaking in [facility], thinks he lives in the hospital  -initially thought to be secondary to electrolyte derangements  -per patient's friend/HCP, patient is oriented at baseline. Pending further collateral from PCP  -f/u graham, b12  -collateral from friend about baseline  -consider CT head On admission, patient significantly altered, with repetitive lip smacking and unable to participate in history. Mental status improved after dialysis, however patient still a bit confused. Able to state name and year, does not know location ([place] knows [place] is president  -Intermittently speaking in [facility], thinks he lives in the hospital  -initially thought to be secondary to electrolyte derangements  -per patient's friend/HCP, patient is oriented at baseline. Pending further collateral from PCP  -may be due to uremia vs sepsis vs stroke (hospital course complicated by HTN) or may be patient's baseline  -f/u syphillis, b12  -CT head non contrast pending  -f/u AM ammonia level On admission, patient significantly altered, with repetitive lip smacking and unable to participate in history. Mental status improved after dialysis, however patient still a bit confused. Able to state name and year, does not know location ([place]), knows [place] is president  -Intermittently speaking in [facility], thinks he lives in the hospital  -initially thought to be secondary to electrolyte derangements  -per patient's friend/HCP, patient is oriented at baseline. s/p head surgery in 2005 s/p fall. Has been admitted multiple times to [place]. Takes medications at home, but unknown which medications. Lives in a shelter. Pending further collateral from PCP  -may be due to uremia vs sepsis vs stroke (hospital course complicated by HTN) or may be patient's baseline  -f/u syphillis, b12  -CT head non contrast pending  -f/u AM ammonia level

## 2022-01-10 NOTE — PROGRESS NOTE ADULT - PROBLEM SELECTOR PLAN 10
F: none, HD  E: HD  N: diabetic and renal diet  Ppx: hep subq  Dispo: 7La to [facility]

## 2022-01-10 NOTE — CONSULT NOTE ADULT - SUBJECTIVE AND OBJECTIVE BOX
Patient is a 61y old  Male who presents with a chief complaint of electrolyte abnormalities (09 Jan 2022 13:27)       HPI:  62yo M hx of ?etoh cirrhosis, HTN, DM2 with neuropathy, hx of toxic megacolon c/b pneumatosis s/p colectomy and ileostomy, admission 6 months ago for hyperkalemia and hyponatremia, presents with generalized weakness and lightheadedness.  Pt is poor historian. Unable to answer when last alcoholic drink was or if active drinker. Unable to obtain ROS.      ED course  Vitals: 97.7F, HR 65, BP 82/52, RR 18 sat 95% RA  Labs: WBC 11, Hb 11.0, K 8.0, Na 119, Cl 90, HCO3 17, Cr 5.95, BUN 72, UA+ nitrite & bacteria, pH 7.10 pco2 46, po2 38  Imaging: CXR w/o consolidation or infiltrates, EKG peaked T waves, left axis deviation  Interventions: calcium gluconate 2g, regular insulin 10u x 3, 3 amp d50, NaHCO3 50meq, CTX 1g, albuterol neb x2, 1L NS bolus x2, lasix 40mg IVP x1 (06 Jan 2022 05:05)      PAST MEDICAL & SURGICAL HISTORY:  DM (diabetes mellitus)    HTN (hypertension)    HLD (hyperlipidemia)    ETOH abuse    Anemia    History of colectomy    History of ileostomy        MEDICATIONS  (STANDING):  amLODIPine   Tablet 10 milliGRAM(s) Oral every 24 hours  atorvastatin 10 milliGRAM(s) Oral at bedtime  cefTRIAXone   IVPB 2000 milliGRAM(s) IV Intermittent every 24 hours  dextrose 40% Gel 15 Gram(s) Oral once  dextrose 5%. 1000 milliLiter(s) (50 mL/Hr) IV Continuous <Continuous>  dextrose 5%. 1000 milliLiter(s) (100 mL/Hr) IV Continuous <Continuous>  dextrose 50% Injectable 25 Gram(s) IV Push once  dextrose 50% Injectable 12.5 Gram(s) IV Push once  dextrose 50% Injectable 25 Gram(s) IV Push once  glucagon  Injectable 1 milliGRAM(s) IntraMuscular once  heparin   Injectable 5000 Unit(s) SubCutaneous every 8 hours  hydrALAZINE 25 milliGRAM(s) Oral every 8 hours  insulin lispro (ADMELOG) corrective regimen sliding scale   SubCutaneous Before meals and at bedtime  iron sucrose IVPB 200 milliGRAM(s) IV Intermittent every 24 hours  levothyroxine 50 MICROGram(s) Oral daily  nicotine -  14 mG/24Hr(s) Patch 1 patch Transdermal daily  QUEtiapine 25 milliGRAM(s) Oral at bedtime  traZODone 100 milliGRAM(s) Oral at bedtime    MEDICATIONS  (PRN):  hydrALAZINE Injectable 10 milliGRAM(s) IV Push every 4 hours PRN systolic BP > 180        FAMILY HISTORY:      CBC Full  -  ( 09 Jan 2022 16:48 )  WBC Count : 7.83 K/uL  RBC Count : 3.22 M/uL  Hemoglobin : 9.4 g/dL  Hematocrit : 28.3 %  Platelet Count - Automated : 218 K/uL  Mean Cell Volume : 87.9 fl  Mean Cell Hemoglobin : 29.2 pg  Mean Cell Hemoglobin Concentration : 33.2 gm/dL  Auto Neutrophil # : 5.80 K/uL  Auto Lymphocyte # : 1.03 K/uL  Auto Monocyte # : 0.59 K/uL  Auto Eosinophil # : 0.33 K/uL  Auto Basophil # : 0.03 K/uL  Auto Neutrophil % : 74.1 %  Auto Lymphocyte % : 13.2 %  Auto Monocyte % : 7.5 %  Auto Eosinophil % : 4.2 %  Auto Basophil % : 0.4 %      01-09    133<L>  |  97  |  38<H>  ----------------------------<  206<H>  4.4   |  20<L>  |  3.32<H>    Ca    9.1      09 Jan 2022 16:48  Phos  5.0     01-09  Mg     1.9     01-09    TPro  8.1  /  Alb  3.9  /  TBili  0.2  /  DBili  x   /  AST  9<L>  /  ALT  <5<L>  /  AlkPhos  72  01-09            Radiology:    < from: Xray Chest 1 View- PORTABLE-Urgent (Xray Chest 1 View- PORTABLE-Urgent .) (01.08.22 @ 09:04) >  ACC: [id number] EXAM:  XR CHEST PORTABLE URGENT 1V                          PROCEDURE DATE:  01/08/2022          INTERPRETATION:  Clinical History: Weakness    Frontal examination of the chest demonstrates the heart to be within   normal limits in transverse diameter. Chronic interstitial changes.   Congestion and/or infiltrates. Visualized osseous structures are within   normal limits.    IMPRESSION: Chronic interstitial changes. Congestion and/or infiltrates                Vital Signs Last 24 Hrs  T(C): 37.1 (10 Ronnie 2022 06:36), Max: 37.1 (10 Ronnie 2022 06:36)  T(F): 98.7 (10 Ronnie 2022 06:36), Max: 98.7 (10 Ronnie 2022 06:36)  HR: 72 (10 Ronnie 2022 05:20) (68 - 94)  BP: 137/61 (10 Ronnie 2022 05:20) (137/61 - 206/88)  BP(mean): 88 (10 Ronnie 2022 05:20) (88 - 141)  RR: 19 (10 Ronnie 2022 05:20) (16 - 20)  SpO2: 96% (10 Ronnie 2022 05:20) (95% - 100%)        REVIEW OF SYSTEMS: per HPI            Physical Exam:  60 yo gentleman lying in bed, awake, wants to go home    Head: normocephalic, atraumatic    Eyes: PERRLA, EOMI, no nystagmus, sclera anicteric    ENT: nasal discharge, uvula midline, no oropharyngeal erythema/exudate    Neck: supple, negative JVD, negative carotid bruits, no thyromegaly    Chest: CTA bilaterally, neg wheeze/ rhonchi/ rales/ crackles/ egophany    Cardiovascular: regular rate and rhythm, neg murmurs/rubs/gallops    Abdomen: + ostomy bag + stool, soft, non distended, non tender to palpation in all 4 quadrants, negative rebound/guarding, normal bowel sounds    Extremities:  R 3-5 toe amps, healed foot ulcer      Neurologic Exam:    Alert and oriented x 3      Motor Exam:    Right UE:             no focal weakness,  > 4/5 throughout    Left UE:               no focal weakness,  > 4/5 throughout    Right LE:             no focal weakness,  > 4/5 throughout    Left LE:                no focal weakness,  > 4/5 throughout               Sensation:           intact to light touch x 4 extremities                                               DTR:                  biceps/brachioradialis: equal                                                       patella/ankle: equal                            Gait:  not tested        PM&R Impression:    1) deconditioned  2) no focal weakness    Recommendations/ Plan :    1) Physical therapy focusing on therapeutic exercises, bed mobility/transfer out of bed evaluation, progressive ambulation with assistive devices prn.    2) Anticipated Disposition Plan/Recs:    pending functional progress

## 2022-01-10 NOTE — PROGRESS NOTE ADULT - ASSESSMENT
60 y/o M HTN, DM complicated by neuropathy, EtOH liver cirrhosis, hx IVDU, toxic megacolon c/b pneumatosis s/p colectomy/ileostomy who presents with generalized weakness x 3 days found to have MEG, hyponatremia, hyperkalemia with altered mental status, now s/p 2 HD sessions. Initially on 7La, now stable for stepdown to F for dispo planning and further collateral regarding baseline mental status.

## 2022-01-10 NOTE — PROGRESS NOTE ADULT - SUBJECTIVE AND OBJECTIVE BOX
Transfer Note from [facility] to [facility]  Hospital Course:  60 y/o M HTN, DM complicated by neuropathy, EtOH liver cirrhosis, hx IVDU, toxic megacolon c/b pneumatosis s/p colectomy/ileostomy who presents with generalized weakness x 3 days found to have MEG, hyponatremia, hyperkalemia with altered mental status with lip smacking, admitted for management and treatment. Patient received treatment for hyperkalemia, with no improvement (K up to 8.0) and decision was made for emergent dialysis. Patient agitated during HD cath placement and ativan 1mg x2 given. Line was placed but patient became michelle to 30s, rapid response was called as patient also not responsive. Gave patient flumazenil, calcium chloride (see rapid response notes for more details), and patient became alert, still disoriented. Decision made to step patient up to MICU for closer monitoring overnight. In MICU, patient underwent dialysis, mental status improving but still altered, stable for stepdown to floors. Last dialysis session on 1/7, that evening patient ripped out HD cath, required suture removal kit to remove what was left, patient likely had air embolus (tachy with hypoxia), resolved that evening. Since being on telemetry, patient has been becoming hypertensive, was started on amlodipine 5, now increased to 10, and also hydral 25 q8, pressures still a bit labile. His mental status has fluctuated the last 2-3 days, he is A&Ox2-3 but a bit delirious, sometimes starting to talk in Belarusian, or saying that he lives in the hospital. Patient stable for stepdown to [facility], will require collateral about baseline mental status and discharge planning, apparently he lives in shelter on [address]. Of note, patient initially anuric and now making good urine.    SUBJECTIVE:  Patient seen and examined at bedside. Denies pain, states he feels "completely fine." Denies chest pain, shortness of breath, headache, n/v/d, leg pain or leg swelling, numbness or tingling in lower extremities.     Vital Signs Last 12 Hrs  T(F): 98.4 (01-10-22 @ 17:19), Max: 98.7 (01-10-22 @ 06:36)  HR: 70 (01-10-22 @ 16:38) (62 - 88)  BP: 140/62 (01-10-22 @ 16:38) (140/62 - 157/70)  BP(mean): 89 (01-10-22 @ 16:38) (89 - 101)  RR: 18 (01-10-22 @ 16:38) (18 - 18)  SpO2: 94% (01-10-22 @ 12:48) (93% - 94%)  I&O's Summary    09 Jan 2022 07:01  -  10 Ronnie 2022 07:00  --------------------------------------------------------  IN: 0 mL / OUT: 1350 mL / NET: -1350 mL    10 Ronnie 2022 07:01  -  10 Ronnie 2022 17:21  --------------------------------------------------------  IN: 0 mL / OUT: 900 mL / NET: -900 mL        PHYSICAL EXAM:  Constitutional: NAD, comfortable in bed.  HEENT: NC/AT, PERRLA, EOMI, no conjunctival pallor or scleral icterus, MMM  Neck: Supple, no JVD  Respiratory: CTA B/L. No w/r/r.   Cardiovascular: RRR, normal S1 and S2, no m/r/g.   Gastrointestinal: +colostomy bag in place, +BS, soft NTND, no guarding or rebound tenderness  Extremities: wwp; no cyanosis, clubbing or edema.   Vascular: Pulses equal and strong throughout.   Neurological: AAOx2 (knows name and year, states he is located at a status, knows [place] is president), no CN deficits, strength and sensation intact throughout.   Skin: No gross skin abnormalities or rashes        LABS:                        8.4    6.71  )-----------( 181      ( 10 Ronnie 2022 11:28 )             26.9     01-10    136  |  99  |  38<H>  ----------------------------<  207<H>  3.8   |  22  |  2.95<H>    Ca    9.3      10 Ronnie 2022 11:28  Phos  4.7     01-10  Mg     1.8     01-10    TPro  7.8  /  Alb  3.7  /  TBili  0.2  /  DBili  x   /  AST  10  /  ALT  <5<L>  /  AlkPhos  65  01-10            RADIOLOGY & ADDITIONAL TESTS:    MEDICATIONS  (STANDING):  amLODIPine   Tablet 10 milliGRAM(s) Oral every 24 hours  atorvastatin 10 milliGRAM(s) Oral at bedtime  cefTRIAXone   IVPB 2000 milliGRAM(s) IV Intermittent every 24 hours  dextrose 40% Gel 15 Gram(s) Oral once  dextrose 5%. 1000 milliLiter(s) (50 mL/Hr) IV Continuous <Continuous>  dextrose 5%. 1000 milliLiter(s) (100 mL/Hr) IV Continuous <Continuous>  dextrose 50% Injectable 25 Gram(s) IV Push once  dextrose 50% Injectable 12.5 Gram(s) IV Push once  dextrose 50% Injectable 25 Gram(s) IV Push once  glucagon  Injectable 1 milliGRAM(s) IntraMuscular once  heparin   Injectable 5000 Unit(s) SubCutaneous every 8 hours  hydrALAZINE 25 milliGRAM(s) Oral every 8 hours  insulin lispro (ADMELOG) corrective regimen sliding scale   SubCutaneous Before meals and at bedtime  iron sucrose IVPB 200 milliGRAM(s) IV Intermittent every 24 hours  levothyroxine 50 MICROGram(s) Oral daily  nicotine -  14 mG/24Hr(s) Patch 1 patch Transdermal daily  QUEtiapine 25 milliGRAM(s) Oral at bedtime  traZODone 100 milliGRAM(s) Oral at bedtime    MEDICATIONS  (PRN):  hydrALAZINE Injectable 10 milliGRAM(s) IV Push every 4 hours PRN systolic BP > 180   Transfer Note from [facility] to [facility]  Hospital Course:  62 y/o M HTN, DM complicated by neuropathy, EtOH liver cirrhosis, hx IVDU, toxic megacolon c/b pneumatosis s/p colectomy/ileostomy who presents with generalized weakness x 3 days found to have MEG, hyponatremia, hyperkalemia with altered mental status with lip smacking, admitted for management and treatment. Patient received treatment for hyperkalemia, with no improvement (K up to 8.0) and decision was made for emergent dialysis. Patient agitated during HD cath placement and ativan 1mg x2 given. Line was placed but patient became michelle to 30s, rapid response was called as patient also not responsive. Gave patient flumazenil, calcium chloride (see rapid response notes for more details), and patient became alert, still disoriented. Decision made to step patient up to MICU for closer monitoring overnight. In MICU, patient underwent dialysis, mental status improving but still altered, stable for stepdown to floors. Last dialysis session on 1/7, that evening patient ripped out HD cath, required suture removal kit to remove what was left, patient likely had air embolus (tachy with hypoxia), resolved that evening. Since being on telemetry, patient has been becoming hypertensive, was started on amlodipine 5, now increased to 10, and also hydral 25 q8, pressures still a bit labile. His mental status has fluctuated the last 2-3 days, he is A&Ox2-3 but a bit delirious, sometimes starting to talk in Indonesian, or saying that he lives in the hospital. Pt also being treated for UTI with CTX (started on 1/7), Patient stable for stepdown to [facility], will require collateral about baseline mental status and discharge planning, apparently he lives in shelter [address] Of note, patient initially anuric and now making good urine.    SUBJECTIVE:  Patient seen and examined at bedside. Denies pain, states he feels "completely fine." Denies chest pain, shortness of breath, headache, n/v/d, leg pain or leg swelling, numbness or tingling in lower extremities.     Vital Signs Last 12 Hrs  T(F): 98.4 (01-10-22 @ 17:19), Max: 98.7 (01-10-22 @ 06:36)  HR: 70 (01-10-22 @ 16:38) (62 - 88)  BP: 140/62 (01-10-22 @ 16:38) (140/62 - 157/70)  BP(mean): 89 (01-10-22 @ 16:38) (89 - 101)  RR: 18 (01-10-22 @ 16:38) (18 - 18)  SpO2: 94% (01-10-22 @ 12:48) (93% - 94%)  I&O's Summary    09 Jan 2022 07:01  -  10 Ronnie 2022 07:00  --------------------------------------------------------  IN: 0 mL / OUT: 1350 mL / NET: -1350 mL    10 Ronnie 2022 07:01  -  10 Ronnie 2022 17:21  --------------------------------------------------------  IN: 0 mL / OUT: 900 mL / NET: -900 mL        PHYSICAL EXAM:  Constitutional: NAD, comfortable in bed.  HEENT: NC/AT, PERRLA, EOMI, no conjunctival pallor or scleral icterus, MMM  Neck: Supple, no JVD  Respiratory: CTA B/L. No w/r/r.   Cardiovascular: RRR, normal S1 and S2, no m/r/g.   Gastrointestinal: +colostomy bag in place, +BS, soft NTND, no guarding or rebound tenderness  Extremities: wwp; no cyanosis, clubbing or edema.   Vascular: Pulses equal and strong throughout.   Neurological: AAOx2 (knows name and year, states he is located at a status, knows [place] is president), no CN deficits, strength and sensation intact throughout.   Skin: No gross skin abnormalities or rashes        LABS:                        8.4    6.71  )-----------( 181      ( 10 Ronnie 2022 11:28 )             26.9     01-10    136  |  99  |  38<H>  ----------------------------<  207<H>  3.8   |  22  |  2.95<H>    Ca    9.3      10 Ronnie 2022 11:28  Phos  4.7     01-10  Mg     1.8     01-10    TPro  7.8  /  Alb  3.7  /  TBili  0.2  /  DBili  x   /  AST  10  /  ALT  <5<L>  /  AlkPhos  65  01-10            RADIOLOGY & ADDITIONAL TESTS:    MEDICATIONS  (STANDING):  amLODIPine   Tablet 10 milliGRAM(s) Oral every 24 hours  atorvastatin 10 milliGRAM(s) Oral at bedtime  cefTRIAXone   IVPB 2000 milliGRAM(s) IV Intermittent every 24 hours  dextrose 40% Gel 15 Gram(s) Oral once  dextrose 5%. 1000 milliLiter(s) (50 mL/Hr) IV Continuous <Continuous>  dextrose 5%. 1000 milliLiter(s) (100 mL/Hr) IV Continuous <Continuous>  dextrose 50% Injectable 25 Gram(s) IV Push once  dextrose 50% Injectable 12.5 Gram(s) IV Push once  dextrose 50% Injectable 25 Gram(s) IV Push once  glucagon  Injectable 1 milliGRAM(s) IntraMuscular once  heparin   Injectable 5000 Unit(s) SubCutaneous every 8 hours  hydrALAZINE 25 milliGRAM(s) Oral every 8 hours  insulin lispro (ADMELOG) corrective regimen sliding scale   SubCutaneous Before meals and at bedtime  iron sucrose IVPB 200 milliGRAM(s) IV Intermittent every 24 hours  levothyroxine 50 MICROGram(s) Oral daily  nicotine -  14 mG/24Hr(s) Patch 1 patch Transdermal daily  QUEtiapine 25 milliGRAM(s) Oral at bedtime  traZODone 100 milliGRAM(s) Oral at bedtime    MEDICATIONS  (PRN):  hydrALAZINE Injectable 10 milliGRAM(s) IV Push every 4 hours PRN systolic BP > 180

## 2022-01-10 NOTE — PROGRESS NOTE ADULT - PROBLEM SELECTOR PLAN 3
Hx of HTN, home med of amlodipine and lisninopril initially held with concern for sepsis. Once stable, started on amlodipine 5, had multiple episodes of elevated BP requiring IV meds, first given labetalol, then hydral. Now on hydral 25 q8  -c/w hydral 25 q8  -c/w amlodipine 10 qd Hx of HTN, home med of amlodipine and lisninopril initially held with concern for sepsis. Once stable, started on amlodipine 5, had multiple episodes of elevated BP requiring IV meds, first given labetalol, then hydral. Now on hydral 25 q8  -c/w hydral 25 q8  -c/w amlodipine 10 qd  -BP now improving, continue to monitor

## 2022-01-10 NOTE — PROGRESS NOTE ADULT - PROBLEM SELECTOR PLAN 1
On admission, patient significantly altered, with repetitive lip smacking and unable to participate in history. Mental status significantly improved after dialysis, however patient still a bit confused. Intermittently speaking in Azeri, thinks he lives in the hospital. Initially thought to be 2/2 ingestion w/ UTI contributing, however now it has persisted and reason is unclear  -f/u syphillis, b12  -collateral from friend about baseline

## 2022-01-11 LAB
ALBUMIN SERPL ELPH-MCNC: 4.3 G/DL — SIGNIFICANT CHANGE UP (ref 3.3–5)
ALP SERPL-CCNC: 75 U/L — SIGNIFICANT CHANGE UP (ref 40–120)
ALT FLD-CCNC: 5 U/L — LOW (ref 10–45)
AMMONIA BLD-MCNC: 21 UMOL/L — SIGNIFICANT CHANGE UP (ref 11–55)
ANION GAP SERPL CALC-SCNC: 15 MMOL/L — SIGNIFICANT CHANGE UP (ref 5–17)
AST SERPL-CCNC: 13 U/L — SIGNIFICANT CHANGE UP (ref 10–40)
BASOPHILS # BLD AUTO: 0.03 K/UL — SIGNIFICANT CHANGE UP (ref 0–0.2)
BASOPHILS NFR BLD AUTO: 0.4 % — SIGNIFICANT CHANGE UP (ref 0–2)
BILIRUB SERPL-MCNC: 0.2 MG/DL — SIGNIFICANT CHANGE UP (ref 0.2–1.2)
BUN SERPL-MCNC: 36 MG/DL — HIGH (ref 7–23)
CALCIUM SERPL-MCNC: 9.8 MG/DL — SIGNIFICANT CHANGE UP (ref 8.4–10.5)
CHLORIDE SERPL-SCNC: 100 MMOL/L — SIGNIFICANT CHANGE UP (ref 96–108)
CO2 SERPL-SCNC: 24 MMOL/L — SIGNIFICANT CHANGE UP (ref 22–31)
CREAT SERPL-MCNC: 2.46 MG/DL — HIGH (ref 0.5–1.3)
CULTURE RESULTS: SIGNIFICANT CHANGE UP
CULTURE RESULTS: SIGNIFICANT CHANGE UP
EOSINOPHIL # BLD AUTO: 0.42 K/UL — SIGNIFICANT CHANGE UP (ref 0–0.5)
EOSINOPHIL NFR BLD AUTO: 6.2 % — HIGH (ref 0–6)
FOLATE SERPL-MCNC: 7.1 NG/ML — SIGNIFICANT CHANGE UP
GLUCOSE BLDC GLUCOMTR-MCNC: 171 MG/DL — HIGH (ref 70–99)
GLUCOSE BLDC GLUCOMTR-MCNC: 184 MG/DL — HIGH (ref 70–99)
GLUCOSE BLDC GLUCOMTR-MCNC: 184 MG/DL — HIGH (ref 70–99)
GLUCOSE BLDC GLUCOMTR-MCNC: 192 MG/DL — HIGH (ref 70–99)
GLUCOSE BLDC GLUCOMTR-MCNC: 313 MG/DL — HIGH (ref 70–99)
GLUCOSE SERPL-MCNC: 179 MG/DL — HIGH (ref 70–99)
HCT VFR BLD CALC: 30 % — LOW (ref 39–50)
HGB BLD-MCNC: 9.7 G/DL — LOW (ref 13–17)
IMM GRANULOCYTES NFR BLD AUTO: 0.4 % — SIGNIFICANT CHANGE UP (ref 0–1.5)
LYMPHOCYTES # BLD AUTO: 1.27 K/UL — SIGNIFICANT CHANGE UP (ref 1–3.3)
LYMPHOCYTES # BLD AUTO: 18.7 % — SIGNIFICANT CHANGE UP (ref 13–44)
MAGNESIUM SERPL-MCNC: 1.9 MG/DL — SIGNIFICANT CHANGE UP (ref 1.6–2.6)
MCHC RBC-ENTMCNC: 29.3 PG — SIGNIFICANT CHANGE UP (ref 27–34)
MCHC RBC-ENTMCNC: 32.3 GM/DL — SIGNIFICANT CHANGE UP (ref 32–36)
MCV RBC AUTO: 90.6 FL — SIGNIFICANT CHANGE UP (ref 80–100)
MONOCYTES # BLD AUTO: 0.69 K/UL — SIGNIFICANT CHANGE UP (ref 0–0.9)
MONOCYTES NFR BLD AUTO: 10.2 % — SIGNIFICANT CHANGE UP (ref 2–14)
NEUTROPHILS # BLD AUTO: 4.35 K/UL — SIGNIFICANT CHANGE UP (ref 1.8–7.4)
NEUTROPHILS NFR BLD AUTO: 64.1 % — SIGNIFICANT CHANGE UP (ref 43–77)
NRBC # BLD: 0 /100 WBCS — SIGNIFICANT CHANGE UP (ref 0–0)
PHOSPHATE SERPL-MCNC: 4.1 MG/DL — SIGNIFICANT CHANGE UP (ref 2.5–4.5)
PLATELET # BLD AUTO: 220 K/UL — SIGNIFICANT CHANGE UP (ref 150–400)
POTASSIUM SERPL-MCNC: 4.1 MMOL/L — SIGNIFICANT CHANGE UP (ref 3.5–5.3)
POTASSIUM SERPL-SCNC: 4.1 MMOL/L — SIGNIFICANT CHANGE UP (ref 3.5–5.3)
PROT SERPL-MCNC: 8.7 G/DL — HIGH (ref 6–8.3)
RBC # BLD: 3.31 M/UL — LOW (ref 4.2–5.8)
RBC # FLD: 13.4 % — SIGNIFICANT CHANGE UP (ref 10.3–14.5)
SODIUM SERPL-SCNC: 139 MMOL/L — SIGNIFICANT CHANGE UP (ref 135–145)
SPECIMEN SOURCE: SIGNIFICANT CHANGE UP
SPECIMEN SOURCE: SIGNIFICANT CHANGE UP
VIT B12 SERPL-MCNC: 584 PG/ML — SIGNIFICANT CHANGE UP (ref 232–1245)
WBC # BLD: 6.79 K/UL — SIGNIFICANT CHANGE UP (ref 3.8–10.5)
WBC # FLD AUTO: 6.79 K/UL — SIGNIFICANT CHANGE UP (ref 3.8–10.5)

## 2022-01-11 PROCEDURE — 99232 SBSQ HOSP IP/OBS MODERATE 35: CPT

## 2022-01-11 PROCEDURE — 99233 SBSQ HOSP IP/OBS HIGH 50: CPT | Mod: GC

## 2022-01-11 PROCEDURE — 70450 CT HEAD/BRAIN W/O DYE: CPT | Mod: 26

## 2022-01-11 RX ORDER — HYDRALAZINE HCL 50 MG
50 TABLET ORAL EVERY 8 HOURS
Refills: 0 | Status: DISCONTINUED | OUTPATIENT
Start: 2022-01-11 | End: 2022-01-19

## 2022-01-11 RX ADMIN — Medication 4: at 21:48

## 2022-01-11 RX ADMIN — Medication 1: at 07:06

## 2022-01-11 RX ADMIN — Medication 1 PATCH: at 06:49

## 2022-01-11 RX ADMIN — INSULIN GLARGINE 3 UNIT(S): 100 INJECTION, SOLUTION SUBCUTANEOUS at 22:38

## 2022-01-11 RX ADMIN — HEPARIN SODIUM 5000 UNIT(S): 5000 INJECTION INTRAVENOUS; SUBCUTANEOUS at 06:43

## 2022-01-11 RX ADMIN — Medication 25 MILLIGRAM(S): at 07:06

## 2022-01-11 RX ADMIN — IRON SUCROSE 110 MILLIGRAM(S): 20 INJECTION, SOLUTION INTRAVENOUS at 21:35

## 2022-01-11 RX ADMIN — Medication 25 MILLIGRAM(S): at 00:22

## 2022-01-11 RX ADMIN — Medication 1 PATCH: at 13:02

## 2022-01-11 RX ADMIN — Medication 1: at 12:28

## 2022-01-11 RX ADMIN — AMLODIPINE BESYLATE 10 MILLIGRAM(S): 2.5 TABLET ORAL at 13:02

## 2022-01-11 RX ADMIN — INSULIN GLARGINE 3 UNIT(S): 100 INJECTION, SOLUTION SUBCUTANEOUS at 00:15

## 2022-01-11 RX ADMIN — QUETIAPINE FUMARATE 25 MILLIGRAM(S): 200 TABLET, FILM COATED ORAL at 21:35

## 2022-01-11 RX ADMIN — Medication 1 PATCH: at 18:38

## 2022-01-11 RX ADMIN — Medication 1: at 17:57

## 2022-01-11 RX ADMIN — ATORVASTATIN CALCIUM 10 MILLIGRAM(S): 80 TABLET, FILM COATED ORAL at 21:35

## 2022-01-11 RX ADMIN — HEPARIN SODIUM 5000 UNIT(S): 5000 INJECTION INTRAVENOUS; SUBCUTANEOUS at 21:35

## 2022-01-11 RX ADMIN — Medication 50 MILLIGRAM(S): at 21:34

## 2022-01-11 RX ADMIN — HEPARIN SODIUM 5000 UNIT(S): 5000 INJECTION INTRAVENOUS; SUBCUTANEOUS at 13:02

## 2022-01-11 RX ADMIN — Medication 50 MILLIGRAM(S): at 16:25

## 2022-01-11 RX ADMIN — HALOPERIDOL DECANOATE 5 MILLIGRAM(S): 100 INJECTION INTRAMUSCULAR at 01:14

## 2022-01-11 RX ADMIN — Medication 100 MILLIGRAM(S): at 21:35

## 2022-01-11 RX ADMIN — Medication 50 MICROGRAM(S): at 06:43

## 2022-01-11 RX ADMIN — CEFTRIAXONE 100 MILLIGRAM(S): 500 INJECTION, POWDER, FOR SOLUTION INTRAMUSCULAR; INTRAVENOUS at 15:53

## 2022-01-11 NOTE — PROGRESS NOTE ADULT - PROBLEM SELECTOR PLAN 2
MEG vs new onset CKD. Admission Cr 5.95, baseline Cr 1.7-1.8. AMS, urine w/ gross pus, appears euvolemic otherwise.  unclear etiology, ATN vs paraproteinemia vs toxic ingestion vs retention. Creat as high as 6.52. as low as 2.87, had gone back up, now downtrending to 2.95 today   s/p emergent dialysis on 1/6, and again on 1/7  --initially anuric and made some urine (approximately 600cc this AM). Currently retaining; soliz placement likely PM 1/11  - renal US: Nl sized kidneys without echogenicity c/w acute process  - S/p self removal of HD cath on 1/7, making urine likely recovering as per Nephro no need for further emergent HD  - hold home cymbalta and baclofen   - trend BMP, patient refusing labs intermittently  - monitor I&O  - avoid nephrotoxic agents  - pending further recommendations from Nephro

## 2022-01-11 NOTE — PROGRESS NOTE ADULT - ATTENDING COMMENTS
interim events noted-   62yo man with MEG- probable ATN-required 2 HD treatments last week (1/6 and 1/7)  creat improving  no additional dialysis needed  h/o cirrhosis, HTN, DM2 with neuropathy, hx of toxic megacolon c/b pneumatosis s/p colectomy and ileostomy   Guajardo for urinary retention- f/u  outpt  BP above goal- restart home dose HCTZ

## 2022-01-11 NOTE — PROGRESS NOTE ADULT - SUBJECTIVE AND OBJECTIVE BOX
Patient was seen and examined by me at bedside. I agree with resident's note, subjective, objective physical exam, assessment and plan with following modifications/additions.    Greater than 35 minutes spent on total encounter; more than 50% of the visit was spent counseling and/or coordinating care by the attending physician.    62 y/o M HTN, DM complicated by neuropathy, EtOH liver cirrhosis, hx IVDU, toxic megacolon c/b pneumatosis s/p colectomy/ileostomy who presented with acute renal failure 2/2 ATN briefly requiring HD now stable without, course c/b metabolic encephalopathy 2/2 UTI and uremia   #ATN, improving creat off HD with good uop- trend creat for now  #Metabolic encephalopathy improving- complete 5 day abx course for UTI (completes today)  #HTN- increase hydral, c/w amlodipine   #Hx of ostomy s/p toxic megacolon- good ostomy outpt, no complications    #DVT px- SQH  #Dispo- pending PT re-eval and then shelter packet if cleared for home, if [facility] start selection process       Marco Zavala MD [phone]

## 2022-01-11 NOTE — PROGRESS NOTE ADULT - PROBLEM SELECTOR PLAN 8
no evidence of bleeding. likely in setting of CKD  - iron panel: RON  - c/w ferrous sulfate 325 mg Po daily  - continue to monitor for signs of bleeding

## 2022-01-11 NOTE — PROGRESS NOTE ADULT - PROBLEM SELECTOR PLAN 3
Hx of HTN, home med of amlodipine and lisninopril initially held with concern for sepsis. Once stable, started on amlodipine 5, had multiple episodes of elevated BP requiring IV meds, first given labetalol, then hydral. Now on hydral 25 q8    Plan:  - Increased to hydral 50 q8  - C/w amlodipine 10 qd  - BP now improving, continue to monitor  - Consider restarting home dose HCTZ 25 mg QD

## 2022-01-11 NOTE — PROGRESS NOTE ADULT - ASSESSMENT
60yo M hx of ?EtOH, cirrhosis, HTN, DM2 with neuropathy, hx of toxic megacolon c/b pneumatosis s/p colectomy and ileostomy presents with acute renal failure requiring emergent HD 1/6 and 1/7    Now non oliguric MEG 2/2 to ATN vs ?toxic ingestion- resolving  Baseline creat~ 1  Guajardo for urinary retention- f/u  outpt  No need for dialysis  Nl sized kidneys without echogenicity c/w acute process    BP uncontrolled, start HCTZ 25mg- home dose.  On start IV iron 200mg x5days    Renal diet, avoid nephrotoxic meds, renally dose meds, strict I&Os

## 2022-01-11 NOTE — PROGRESS NOTE ADULT - ASSESSMENT
60 y/o M HTN, DM complicated by neuropathy, EtOH liver cirrhosis, hx IVDU, toxic megacolon c/b pneumatosis s/p colectomy/ileostomy who presents with generalized weakness x 3 days found to have MEG, hyponatremia, hyperkalemia with altered mental status, now s/p 2 HD sessions. Initially on 7La, now stable for stepdown to F on 1/10 for dispo planning and further collateral regarding baseline mental status.

## 2022-01-11 NOTE — PROGRESS NOTE ADULT - PROBLEM SELECTOR PLAN 6
Hx DM2. 07/2021 A1c 8.1%. Home meds Latesha Bustillos  - A1c 6.7  -Started on Lantus 3u  - mISS  -continue to monitor blood glucose

## 2022-01-11 NOTE — PROGRESS NOTE ADULT - PROBLEM SELECTOR PLAN 1
On admission, patient significantly altered, with repetitive lip smacking and unable to participate in history. Mental status improved after dialysis, however patient still a bit confused. Able to state name and year, does not know location ([place]), knows [place] is president. Initially thought to be secondary to electrolyte derangements. CT neg. Intermittently speaking in Urdu, thinks he lives in the hospital. Ammonia, syphillis, b12 neg    Plan:   - per patient's friend/HCP, patient is oriented at baseline. s/p head surgery in 2005 s/p fall. Has been admitted multiple times to [place]. Takes medications at home, but unknown which medications. Lives in a shelter. Pending further collateral from PCP  - may be due to uremia vs sepsis vs stroke (hospital course complicated by HTN) or may be patient's baseline

## 2022-01-11 NOTE — PROGRESS NOTE ADULT - SUBJECTIVE AND OBJECTIVE BOX
Patient is a 61y Male seen and evaluated at bedside. Comfortable, NAD. MEG resolving. BP uncontrolled, start HCTZ 25mg- home dose. Failed ToV- place soliz and SIDNEY consult.     Meds:    amLODIPine   Tablet 10 every 24 hours  atorvastatin 10 at bedtime  cefTRIAXone   IVPB 2000 every 24 hours  dextrose 40% Gel 15 once  dextrose 5%. 1000 <Continuous>  dextrose 5%. 1000 <Continuous>  dextrose 50% Injectable 25 once  dextrose 50% Injectable 12.5 once  dextrose 50% Injectable 25 once  glucagon  Injectable 1 once  heparin   Injectable 5000 every 8 hours  hydrALAZINE 50 every 8 hours  hydrALAZINE Injectable 10 every 4 hours PRN  insulin glargine Injectable (LANTUS) 3 at bedtime  insulin lispro (ADMELOG) corrective regimen sliding scale  Before meals and at bedtime  iron sucrose IVPB 200 every 24 hours  levothyroxine 50 daily  nicotine -  14 mG/24Hr(s) Patch 1 daily  QUEtiapine 25 at bedtime  traZODone 100 at bedtime      T(C): , Max: 37.4 (01-10-22 @ 19:25)  T(F): , Max: 99.3 (01-10-22 @ 19:25)  HR: 79 (01-11-22 @ 15:43)  BP: 161/67 (01-11-22 @ 15:43)  BP(mean): 89 (01-10-22 @ 16:38)  RR: 17 (01-11-22 @ 15:43)  SpO2: 94% (01-11-22 @ 15:43)  Wt(kg): --    01-10 @ 07:01  -  01-11 @ 07:00  --------------------------------------------------------  IN: 0 mL / OUT: 1560 mL / NET: -1560 mL    01-11 @ 07:01  -  01-11 @ 16:02  --------------------------------------------------------  IN: 240 mL / OUT: 500 mL / NET: -260 mL    Review of Systems:  RESPIRATORY: No shortness of breath  CARDIOVASCULAR: No chest pain   MUSCULOSKELETAL: No leg edema    PHYSICAL EXAM:  GENERAL: Awake, alert, on RA  CHEST/LUNG: Bilateral clear breath sounds, no rales  HEART: Regular rate and rhythm, no murmur  ABDOMEN: Soft, nontender, non distended  EXTREMITIES: No pedal edema    LABS:                        9.7    6.79  )-----------( 220      ( 11 Jan 2022 08:39 )             30.0     01-11    139  |  100  |  36<H>  ----------------------------<  179<H>  4.1   |  24  |  2.46<H>    Ca    9.8      11 Jan 2022 08:39  Phos  4.1     01-11  Mg     1.9     01-11    TPro  8.7<H>  /  Alb  4.3  /  TBili  0.2  /  DBili  x   /  AST  13  /  ALT  5<L>  /  AlkPhos  75  01-11                RADIOLOGY & ADDITIONAL STUDIES:

## 2022-01-11 NOTE — PROGRESS NOTE ADULT - SUBJECTIVE AND OBJECTIVE BOX
Hospital Course:  62 y/o M HTN, DM complicated by neuropathy, EtOH liver cirrhosis, hx IVDU, toxic megacolon s/p colectomy/ileostomy who presents with generalized weakness x 3 days found to have MEG, hyponatremia, hyperkalemia with altered mental admitted for management and treatment. Patient received treatment for hyperkalemia, with no improvement (K up to 8.0) and decision was made for emergent HD. Patient agitated during HD cath placement and ativan 1mg x2 given. Line was placed but patient became michelle to 30s, rapid response was called as patient also not responsive. Gave patient flumazenil, calcium chloride (see rapid response notes for more details), and patient became alert, still disoriented. Decision made to step patient up to MICU for closer monitoring overnight. In MICU, patient underwent dialysis, mental status improving but still altered, stable for stepdown to floors. Last dialysis session on 1/7, that evening patient ripped out HD cath, required suture removal kit to remove what was left, patient likely had air embolus (tachy with hypoxia), resolved that evening. Since being on telemetry, patient has been becoming hypertensive, was started on amlodipine 5, now increased to 10, and also hydral 25 q8, pressures still a bit labile. His mental status has fluctuated the last 2-3 days, he is A&Ox2-3 but a bit delirious, sometimes starting to talk in Turkmen, or saying that he lives in the hospital. Pt also being treated for UTI with CTX (started on 1/7), Patient stable for stepdown to F, will require collateral about baseline mental status and discharge planning, apparently he lives in shelter on [address] [place] [address]. Of note, patient initially anuric and was able to make some urine. On 1/11, pt failed TOV x2 and a soliz was placed later that evening. Plan for dispo to [facility] 1/12 upon authorization.     SUBJECTIVE:  Patient seen and examined at bedside. Denies pain, states he feels "completely fine." Denies chest pain, shortness of breath, headache, n/v/d, leg pain or leg swelling, weakness in lower extremities.       OBJECTIVE  Vital Signs Last 24 Hrs  T(C): 36.7 (11 Jan 2022 15:43), Max: 37.4 (10 Ronnie 2022 19:25)  T(F): 98.1 (11 Jan 2022 15:43), Max: 99.3 (10 Ronnie 2022 19:25)  HR: 79 (11 Jan 2022 15:43) (67 - 89)  BP: 161/67 (11 Jan 2022 15:43) (155/68 - 174/66)  RR: 17 (11 Jan 2022 15:43) (16 - 18)  SpO2: 94% (11 Jan 2022 15:43) (94% - 97%) CED     I&O's Summary    10 Ronnie 2022 07:01  -  11 Jan 2022 07:00  --------------------------------------------------------  IN: 0 mL / OUT: 1560 mL / NET: -1560 mL    11 Jan 2022 07:01  -  11 Jan 2022 17:20  --------------------------------------------------------  IN: 240 mL / OUT: 500 mL / NET: -260 mL    I&O's Summary    09 Jan 2022 07:01  -  10 Ronnie 2022 07:00  --------------------------------------------------------  IN: 0 mL / OUT: 1350 mL / NET: -1350 mL    10 Ronnie 2022 07:01  -  10 Ronnie 2022 17:21  --------------------------------------------------------  IN: 0 mL / OUT: 900 mL / NET: -900 mL        PHYSICAL EXAM:  Constitutional: NAD, comfortable in bed.  HEENT: NC/AT, PERRLA, EOMI, no conjunctival pallor or scleral icterus, MMM  Neck: Supple, no JVD  Respiratory: CTA B/L. No w/r/r.   Cardiovascular: RRR, normal S1 and S2, no m/r/g.   Gastrointestinal: +colostomy bag in place, +BS, soft NTND, no guarding or rebound tenderness  Extremities: wwp; no cyanosis, clubbing or edema.   Vascular: Pulses equal and strong throughout.   Neurological: AAOx2 (knows name and year, states he is located at a status, knows [place] is president), no CN deficits, strength and sensation intact throughout. Unable to follow some commands  Skin: No gross skin abnormalities or rashes        LABS:                                   9.7    6.79  )-----------( 220      ( 11 Jan 2022 08:39 )             30.0   01-11    139  |  100  |  36<H>  ----------------------------<  179<H>  4.1   |  24  |  2.46<H>    Ca    9.8      11 Jan 2022 08:39  Phos  4.1     01-11  Mg     1.9     01-11    TPro  8.7<H>  /  Alb  4.3  /  TBili  0.2  /  DBili  x   /  AST  13  /  ALT  5<L>  /  AlkPhos  75  01-11        RADIOLOGY & ADDITIONAL TESTS REVIEWED: Yes     MEDICATIONS  (STANDING):  amLODIPine   Tablet 10 milliGRAM(s) Oral every 24 hours  atorvastatin 10 milliGRAM(s) Oral at bedtime  cefTRIAXone   IVPB 2000 milliGRAM(s) IV Intermittent every 24 hours  dextrose 40% Gel 15 Gram(s) Oral once  dextrose 5%. 1000 milliLiter(s) (50 mL/Hr) IV Continuous <Continuous>  dextrose 5%. 1000 milliLiter(s) (100 mL/Hr) IV Continuous <Continuous>  dextrose 50% Injectable 25 Gram(s) IV Push once  dextrose 50% Injectable 12.5 Gram(s) IV Push once  dextrose 50% Injectable 25 Gram(s) IV Push once  glucagon  Injectable 1 milliGRAM(s) IntraMuscular once  heparin   Injectable 5000 Unit(s) SubCutaneous every 8 hours  hydrALAZINE 25 milliGRAM(s) Oral every 8 hours  insulin lispro (ADMELOG) corrective regimen sliding scale   SubCutaneous Before meals and at bedtime  iron sucrose IVPB 200 milliGRAM(s) IV Intermittent every 24 hours  levothyroxine 50 MICROGram(s) Oral daily  nicotine -  14 mG/24Hr(s) Patch 1 patch Transdermal daily  QUEtiapine 25 milliGRAM(s) Oral at bedtime  traZODone 100 milliGRAM(s) Oral at bedtime    MEDICATIONS  (PRN):  hydrALAZINE Injectable 10 milliGRAM(s) IV Push every 4 hours PRN systolic BP > 180

## 2022-01-11 NOTE — CHART NOTE - NSCHARTNOTEFT_GEN_A_CORE
Admitting Diagnosis:   Patient is a 61y old  Male who presents with a chief complaint of electrolyte abnormalities (10 Ronnie 2022 17:17)      PAST MEDICAL & SURGICAL HISTORY:  DM (diabetes mellitus)    HTN (hypertension)    HLD (hyperlipidemia)    ETOH abuse    Anemia    History of colectomy    History of ileostomy        Current Nutrition Order:  Renal, CSTCHO    PO Intake: Good (%) [   ]  Fair (50-75%) [   ] Poor (<25%) [   ]  Unable to assess at this time    GI Issues:   No apparent GI distress  Colostomy in place- w/ output per RN; h/o resection 2/2 megatoxicolon    Pain:  No pain reported.    Skin Integrity:  R toe amputated    Labs:   01-11    139  |  100  |  36<H>  ----------------------------<  179<H>  4.1   |  24  |  2.46<H>    Ca    9.8      11 Jan 2022 08:39  Phos  4.1     01-11  Mg     1.9     01-11    TPro  8.7<H>  /  Alb  4.3  /  TBili  0.2  /  DBili  x   /  AST  13  /  ALT  5<L>  /  AlkPhos  75  01-11    CAPILLARY BLOOD GLUCOSE      POCT Blood Glucose.: 171 mg/dL (11 Jan 2022 11:29)  POCT Blood Glucose.: 184 mg/dL (11 Jan 2022 06:44)  POCT Blood Glucose.: 184 mg/dL (11 Jan 2022 00:14)  POCT Blood Glucose.: 200 mg/dL (10 Ronnie 2022 21:57)  POCT Blood Glucose.: 195 mg/dL (10 Ronnie 2022 16:57)      Medications:  MEDICATIONS  (STANDING):  amLODIPine   Tablet 10 milliGRAM(s) Oral every 24 hours  atorvastatin 10 milliGRAM(s) Oral at bedtime  cefTRIAXone   IVPB 2000 milliGRAM(s) IV Intermittent every 24 hours  dextrose 40% Gel 15 Gram(s) Oral once  dextrose 5%. 1000 milliLiter(s) (50 mL/Hr) IV Continuous <Continuous>  dextrose 5%. 1000 milliLiter(s) (100 mL/Hr) IV Continuous <Continuous>  dextrose 50% Injectable 25 Gram(s) IV Push once  dextrose 50% Injectable 12.5 Gram(s) IV Push once  dextrose 50% Injectable 25 Gram(s) IV Push once  glucagon  Injectable 1 milliGRAM(s) IntraMuscular once  heparin   Injectable 5000 Unit(s) SubCutaneous every 8 hours  hydrALAZINE 50 milliGRAM(s) Oral every 8 hours  insulin glargine Injectable (LANTUS) 3 Unit(s) SubCutaneous at bedtime  insulin lispro (ADMELOG) corrective regimen sliding scale   SubCutaneous Before meals and at bedtime  iron sucrose IVPB 200 milliGRAM(s) IV Intermittent every 24 hours  levothyroxine 50 MICROGram(s) Oral daily  nicotine -  14 mG/24Hr(s) Patch 1 patch Transdermal daily  QUEtiapine 25 milliGRAM(s) Oral at bedtime  traZODone 100 milliGRAM(s) Oral at bedtime    MEDICATIONS  (PRN):  hydrALAZINE Injectable 10 milliGRAM(s) IV Push every 4 hours PRN systolic BP > 180      Admitted Anthropometrics:  Height: 5'6" Weight: 164lbs, IBW 142lbs+/-10%, %%, BMI 26.6    Weight Change:   No new weights    Estimated energy needs:   ABW used for calculations as pt between % of IBW.   Nutrient needs based on [facility] standards of care for maintenance in adults.   1720-2019kcal/day (23-27kcal/kg)  75-90g pro/day (1-1.2g pro/kg) 2/2 no longer on dialysis. Will reassess if needs can be increased.   Fluids per team.     Subjective:   60 y/o M HTN, DM complicated by neuropathy, EtOH liver cirrhosis, hx IVDU, toxic megacolon c/b pneumatosis s/p colectomy/ileostomy who presents with generalized weakness x 3 days found to have MEG, hyponatremia, hyperkalemia with altered mental status, now s/p 2 HD sessions. Initially on 7La, now stable for stepdown to [facility] for dispo planning and further collateral regarding baseline mental status.    Pt seen in room, resting in bed. Currently A&Ox x1. Unable to obtain subjective information from patient. Currently on a renal, CSTCHO diet and tolerating PO. Requiring total feeding assistance. PCA who assisted in feeding was off unit so unclear of adequacy of intake at this time. Of note, pt was last admitted 7/2021 where he was documented to weigh 66kg. Admitted weight this admission is 74.8kg. Noticed to have some bony protrusions in clavicular region and likely poor PO intake prior to admission given living circumstances, electrolyte abnormalities and MEG on admission. Suspect moderate PCM. No food allergies documented in EMR. Noted to have R to amputated. GI: colostomy; having output per RN but none documented in EMR. Notable labs: POCT , 184, 200, 195, 203, BUN 38, Cr 2.95, Phos 4.7, A1C 6.7. RD to follow.     Previous Nutrition Diagnosis:  Increased nutrient needs RT hypermetabolic state AEB ARF, need for emergent HD   Active [x   ]  Resolved [   ]    If resolved, new PES:     Goal:  Pt to consistently meet % of estimated needs PO     Recommendations:  1. Continue on current diet order  2. If intake is <50% recommend adding on Nepro ONS  3. Nephrovite daily  4. BMP, BG Q6hrs, CBC per MD discretion  5. Please trend colostomy output.     Education:   N/A AMS    Risk Level: High [ x  ] Moderate [   ] Low [   ]

## 2022-01-11 NOTE — PROGRESS NOTE ADULT - PROBLEM SELECTOR PLAN 4
UA grossly positive, patient c/o dysuria, UCx growing klebsiella sensitive to ceftriaxone  - c/w ceftriaxone 2g (1/7-1/11)

## 2022-01-12 DIAGNOSIS — G93.41 METABOLIC ENCEPHALOPATHY: ICD-10-CM

## 2022-01-12 LAB
% ALBUMIN: 47.7 % — SIGNIFICANT CHANGE UP
% ALPHA 1: 5.4 % — SIGNIFICANT CHANGE UP
% ALPHA 2: 13 % — SIGNIFICANT CHANGE UP
% BETA: 12.3 % — SIGNIFICANT CHANGE UP
% GAMMA: 21.6 % — SIGNIFICANT CHANGE UP
% M SPIKE: SIGNIFICANT CHANGE UP
ALBUMIN SERPL ELPH-MCNC: 3.5 G/DL — LOW (ref 3.6–5.5)
ALBUMIN SERPL ELPH-MCNC: 4.1 G/DL — SIGNIFICANT CHANGE UP (ref 3.3–5)
ALBUMIN/GLOB SERPL ELPH: 0.9 RATIO — SIGNIFICANT CHANGE UP
ALP SERPL-CCNC: 70 U/L — SIGNIFICANT CHANGE UP (ref 40–120)
ALPHA1 GLOB SERPL ELPH-MCNC: 0.4 G/DL — SIGNIFICANT CHANGE UP (ref 0.1–0.4)
ALPHA2 GLOB SERPL ELPH-MCNC: 1 G/DL — SIGNIFICANT CHANGE UP (ref 0.5–1)
ALT FLD-CCNC: 5 U/L — LOW (ref 10–45)
ANION GAP SERPL CALC-SCNC: 17 MMOL/L — SIGNIFICANT CHANGE UP (ref 5–17)
AST SERPL-CCNC: 12 U/L — SIGNIFICANT CHANGE UP (ref 10–40)
B-GLOBULIN SERPL ELPH-MCNC: 0.9 G/DL — SIGNIFICANT CHANGE UP (ref 0.5–1)
BILIRUB SERPL-MCNC: 0.2 MG/DL — SIGNIFICANT CHANGE UP (ref 0.2–1.2)
BUN SERPL-MCNC: 37 MG/DL — HIGH (ref 7–23)
CALCIUM SERPL-MCNC: 9.3 MG/DL — SIGNIFICANT CHANGE UP (ref 8.4–10.5)
CHLORIDE SERPL-SCNC: 102 MMOL/L — SIGNIFICANT CHANGE UP (ref 96–108)
CO2 SERPL-SCNC: 21 MMOL/L — LOW (ref 22–31)
CREAT ?TM UR-MCNC: 62 MG/DL — SIGNIFICANT CHANGE UP
CREAT ?TM UR-MCNC: 62 MG/DL — SIGNIFICANT CHANGE UP
CREAT SERPL-MCNC: 2.2 MG/DL — HIGH (ref 0.5–1.3)
GAMMA GLOBULIN: 1.6 G/DL — SIGNIFICANT CHANGE UP (ref 0.6–1.6)
GLUCOSE BLDC GLUCOMTR-MCNC: 182 MG/DL — HIGH (ref 70–99)
GLUCOSE BLDC GLUCOMTR-MCNC: 193 MG/DL — HIGH (ref 70–99)
GLUCOSE BLDC GLUCOMTR-MCNC: 222 MG/DL — HIGH (ref 70–99)
GLUCOSE BLDC GLUCOMTR-MCNC: 284 MG/DL — HIGH (ref 70–99)
GLUCOSE SERPL-MCNC: 196 MG/DL — HIGH (ref 70–99)
HCT VFR BLD CALC: 29.3 % — LOW (ref 39–50)
HGB BLD-MCNC: 9.6 G/DL — LOW (ref 13–17)
INTERPRETATION SERPL IFE-IMP: SIGNIFICANT CHANGE UP
M-SPIKE: SIGNIFICANT CHANGE UP (ref 0–0)
MAGNESIUM SERPL-MCNC: 1.7 MG/DL — SIGNIFICANT CHANGE UP (ref 1.6–2.6)
MCHC RBC-ENTMCNC: 29.6 PG — SIGNIFICANT CHANGE UP (ref 27–34)
MCHC RBC-ENTMCNC: 32.8 GM/DL — SIGNIFICANT CHANGE UP (ref 32–36)
MCV RBC AUTO: 90.4 FL — SIGNIFICANT CHANGE UP (ref 80–100)
NRBC # BLD: 0 /100 WBCS — SIGNIFICANT CHANGE UP (ref 0–0)
OSMOLALITY UR: 351 MOSM/KG — SIGNIFICANT CHANGE UP (ref 300–900)
PHOSPHATE SERPL-MCNC: 3.3 MG/DL — SIGNIFICANT CHANGE UP (ref 2.5–4.5)
PLATELET # BLD AUTO: 217 K/UL — SIGNIFICANT CHANGE UP (ref 150–400)
POTASSIUM SERPL-MCNC: 3.7 MMOL/L — SIGNIFICANT CHANGE UP (ref 3.5–5.3)
POTASSIUM SERPL-SCNC: 3.7 MMOL/L — SIGNIFICANT CHANGE UP (ref 3.5–5.3)
POTASSIUM UR-SCNC: 24 MMOL/L — SIGNIFICANT CHANGE UP
PROT ?TM UR-MCNC: 77 MG/DL — HIGH (ref 0–12)
PROT PATTERN SERPL ELPH-IMP: SIGNIFICANT CHANGE UP
PROT SERPL-MCNC: 8.2 G/DL — SIGNIFICANT CHANGE UP (ref 6–8.3)
PROT/CREAT UR-RTO: 1.2 RATIO — HIGH (ref 0–0.2)
RBC # BLD: 3.24 M/UL — LOW (ref 4.2–5.8)
RBC # FLD: 13.3 % — SIGNIFICANT CHANGE UP (ref 10.3–14.5)
SARS-COV-2 RNA SPEC QL NAA+PROBE: SIGNIFICANT CHANGE UP
SODIUM SERPL-SCNC: 140 MMOL/L — SIGNIFICANT CHANGE UP (ref 135–145)
SODIUM UR-SCNC: 45 MMOL/L — SIGNIFICANT CHANGE UP
UUN UR-MCNC: 486 MG/DL — SIGNIFICANT CHANGE UP
WBC # BLD: 6.67 K/UL — SIGNIFICANT CHANGE UP (ref 3.8–10.5)
WBC # FLD AUTO: 6.67 K/UL — SIGNIFICANT CHANGE UP (ref 3.8–10.5)

## 2022-01-12 PROCEDURE — 99233 SBSQ HOSP IP/OBS HIGH 50: CPT | Mod: GC

## 2022-01-12 RX ORDER — TAMSULOSIN HYDROCHLORIDE 0.4 MG/1
0.4 CAPSULE ORAL AT BEDTIME
Refills: 0 | Status: DISCONTINUED | OUTPATIENT
Start: 2022-01-12 | End: 2022-01-19

## 2022-01-12 RX ORDER — MAGNESIUM SULFATE 500 MG/ML
1 VIAL (ML) INJECTION ONCE
Refills: 0 | Status: COMPLETED | OUTPATIENT
Start: 2022-01-12 | End: 2022-01-12

## 2022-01-12 RX ADMIN — Medication 2: at 13:12

## 2022-01-12 RX ADMIN — Medication 3: at 17:24

## 2022-01-12 RX ADMIN — Medication 50 MICROGRAM(S): at 07:22

## 2022-01-12 RX ADMIN — Medication 1: at 07:27

## 2022-01-12 RX ADMIN — AMLODIPINE BESYLATE 10 MILLIGRAM(S): 2.5 TABLET ORAL at 13:05

## 2022-01-12 RX ADMIN — QUETIAPINE FUMARATE 25 MILLIGRAM(S): 200 TABLET, FILM COATED ORAL at 22:19

## 2022-01-12 RX ADMIN — Medication 1: at 22:18

## 2022-01-12 RX ADMIN — Medication 1 PATCH: at 13:06

## 2022-01-12 RX ADMIN — CEFTRIAXONE 100 MILLIGRAM(S): 500 INJECTION, POWDER, FOR SOLUTION INTRAMUSCULAR; INTRAVENOUS at 15:13

## 2022-01-12 RX ADMIN — Medication 50 MILLIGRAM(S): at 07:22

## 2022-01-12 RX ADMIN — HEPARIN SODIUM 5000 UNIT(S): 5000 INJECTION INTRAVENOUS; SUBCUTANEOUS at 22:20

## 2022-01-12 RX ADMIN — INSULIN GLARGINE 3 UNIT(S): 100 INJECTION, SOLUTION SUBCUTANEOUS at 22:18

## 2022-01-12 RX ADMIN — HEPARIN SODIUM 5000 UNIT(S): 5000 INJECTION INTRAVENOUS; SUBCUTANEOUS at 07:23

## 2022-01-12 RX ADMIN — HEPARIN SODIUM 5000 UNIT(S): 5000 INJECTION INTRAVENOUS; SUBCUTANEOUS at 13:05

## 2022-01-12 RX ADMIN — ATORVASTATIN CALCIUM 10 MILLIGRAM(S): 80 TABLET, FILM COATED ORAL at 22:19

## 2022-01-12 RX ADMIN — Medication 50 MILLIGRAM(S): at 22:20

## 2022-01-12 RX ADMIN — TAMSULOSIN HYDROCHLORIDE 0.4 MILLIGRAM(S): 0.4 CAPSULE ORAL at 22:20

## 2022-01-12 RX ADMIN — Medication 50 MILLIGRAM(S): at 13:06

## 2022-01-12 RX ADMIN — Medication 100 GRAM(S): at 13:36

## 2022-01-12 RX ADMIN — Medication 100 MILLIGRAM(S): at 22:19

## 2022-01-12 RX ADMIN — Medication 1 PATCH: at 05:42

## 2022-01-12 RX ADMIN — IRON SUCROSE 110 MILLIGRAM(S): 20 INJECTION, SOLUTION INTRAVENOUS at 22:18

## 2022-01-12 NOTE — PROGRESS NOTE ADULT - PROBLEM SELECTOR PLAN 4
UA grossly positive, patient c/o dysuria, UCx growing klebsiella sensitive to ceftriaxone  - c/w ceftriaxone 2g (1/7-1/11) Iron studies consistent w/ iron deficiency anemia.     - c/w ferrous sulfate 325 mg Po daily  - continue to monitor for signs of bleeding

## 2022-01-12 NOTE — PROGRESS NOTE ADULT - PROBLEM SELECTOR PLAN 9
c/w home atorvastatin 10 mg QD RESOLVED     pt with episodes of bradycardia. Cardiology following and likely in setting of electrolyte derangements 2/2 to renal failure. s/p atropine 0.5 mg x2.  s/p dopamine gtt while receiving HD, now off pressors     - TTE unremarkable  - cards following, appreciate recs  - No episodes of bradycardia since 1/6

## 2022-01-12 NOTE — PROGRESS NOTE ADULT - SUBJECTIVE AND OBJECTIVE BOX
OVERNIGHT EVENTS:    SUBJECTIVE / INTERVAL HPI: Patient seen and examined at bedside.     VITAL SIGNS:  Vital Signs Last 24 Hrs  T(C): 36.9 (12 Jan 2022 05:00), Max: 37.2 (11 Jan 2022 20:35)  T(F): 98.4 (12 Jan 2022 05:00), Max: 98.9 (11 Jan 2022 20:35)  HR: 90 (12 Jan 2022 05:00) (76 - 94)  BP: 155/81 (12 Jan 2022 05:00) (155/81 - 174/66)  BP(mean): 106 (12 Jan 2022 05:00) (106 - 110)  RR: 16 (12 Jan 2022 05:00) (16 - 18)  SpO2: 94% (12 Jan 2022 05:00) (94% - 97%)  I&O's Summary    11 Jan 2022 07:01  -  12 Jan 2022 07:00  --------------------------------------------------------  IN: 240 mL / OUT: 1900 mL / NET: -1660 mL        PHYSICAL EXAM:    General: NAD, lying comfortably in bed   HEENT: Anicteric sclera, EOMI, MMM  Cardiovascular: +S1/S2; RRR; No JVD   Respiratory: CTAB, no accessory muscle use  Gastrointestinal: soft, non-distended, no tenderness to palpation  Extremities: WWP; no edema, no erythema, no tenderness to palpation  Vascular: 2+ radial, DP/PT pulses B/L  Neurological: AAOx3; no focal deficits  Skin: No visible rash, skin discoloration    MEDICATIONS:  MEDICATIONS  (STANDING):  amLODIPine   Tablet 10 milliGRAM(s) Oral every 24 hours  atorvastatin 10 milliGRAM(s) Oral at bedtime  cefTRIAXone   IVPB 2000 milliGRAM(s) IV Intermittent every 24 hours  dextrose 40% Gel 15 Gram(s) Oral once  dextrose 5%. 1000 milliLiter(s) (50 mL/Hr) IV Continuous <Continuous>  dextrose 5%. 1000 milliLiter(s) (100 mL/Hr) IV Continuous <Continuous>  dextrose 50% Injectable 25 Gram(s) IV Push once  dextrose 50% Injectable 12.5 Gram(s) IV Push once  dextrose 50% Injectable 25 Gram(s) IV Push once  glucagon  Injectable 1 milliGRAM(s) IntraMuscular once  heparin   Injectable 5000 Unit(s) SubCutaneous every 8 hours  hydrALAZINE 50 milliGRAM(s) Oral every 8 hours  insulin glargine Injectable (LANTUS) 3 Unit(s) SubCutaneous at bedtime  insulin lispro (ADMELOG) corrective regimen sliding scale   SubCutaneous Before meals and at bedtime  iron sucrose IVPB 200 milliGRAM(s) IV Intermittent every 24 hours  levothyroxine 50 MICROGram(s) Oral daily  nicotine -  14 mG/24Hr(s) Patch 1 patch Transdermal daily  QUEtiapine 25 milliGRAM(s) Oral at bedtime  traZODone 100 milliGRAM(s) Oral at bedtime    MEDICATIONS  (PRN):  hydrALAZINE Injectable 10 milliGRAM(s) IV Push every 4 hours PRN systolic BP > 180      ALLERGIES:  Allergies    No Known Allergies    Intolerances        LABS:                        9.7    6.79  )-----------( 220      ( 11 Jan 2022 08:39 )             30.0     01-11    139  |  100  |  36<H>  ----------------------------<  179<H>  4.1   |  24  |  2.46<H>    Ca    9.8      11 Jan 2022 08:39  Phos  4.1     01-11  Mg     1.9     01-11    TPro  8.7<H>  /  Alb  4.3  /  TBili  0.2  /  DBili  x   /  AST  13  /  ALT  5<L>  /  AlkPhos  75  01-11        CAPILLARY BLOOD GLUCOSE      POCT Blood Glucose.: 182 mg/dL (12 Jan 2022 06:57)      RADIOLOGY & ADDITIONAL TESTS: Reviewed.   OVERNIGHT EVENTS: No acute events overnight. Pt remained afebrile and hemodynamically stable.      SUBJECTIVE / INTERVAL HPI: Patient seen and examined at bedside. Endorses weakness. No additional complaints.     ROS: 12 pt ROS otherwise negative unless stated above.      VITAL SIGNS:  Vital Signs Last 24 Hrs  T(C): 36.9 (12 Jan 2022 05:00), Max: 37.2 (11 Jan 2022 20:35)  T(F): 98.4 (12 Jan 2022 05:00), Max: 98.9 (11 Jan 2022 20:35)  HR: 90 (12 Jan 2022 05:00) (76 - 94)  BP: 155/81 (12 Jan 2022 05:00) (155/81 - 174/66)  BP(mean): 106 (12 Jan 2022 05:00) (106 - 110)  RR: 16 (12 Jan 2022 05:00) (16 - 18)  SpO2: 94% (12 Jan 2022 05:00) (94% - 97%)  I&O's Summary    11 Jan 2022 07:01  -  12 Jan 2022 07:00  --------------------------------------------------------  IN: 240 mL / OUT: 1900 mL / NET: -1660 mL        PHYSICAL EXAM:    General: NAD, lying comfortably in bed   HEENT: Anicteric sclera, EOMI, MMM  Cardiovascular: +S1/S2; RRR; No JVD   Respiratory: CTAB, no accessory muscle use  Gastrointestinal: soft, non-distended, no tenderness to palpation, colostomy bag clean dry w/ formed stool in place   Extremities: WWP; no edema, no erythema, no tenderness to palpation  Vascular: 2+ radial, DP/PT pulses B/L  Neurological: AAOx3; mild tremor present while resting in chair, 4/5 strength in bilateral upper and lower extremities   Skin: No visible rash, skin discoloration    MEDICATIONS:  MEDICATIONS  (STANDING):  amLODIPine   Tablet 10 milliGRAM(s) Oral every 24 hours  atorvastatin 10 milliGRAM(s) Oral at bedtime  cefTRIAXone   IVPB 2000 milliGRAM(s) IV Intermittent every 24 hours  dextrose 40% Gel 15 Gram(s) Oral once  dextrose 5%. 1000 milliLiter(s) (50 mL/Hr) IV Continuous <Continuous>  dextrose 5%. 1000 milliLiter(s) (100 mL/Hr) IV Continuous <Continuous>  dextrose 50% Injectable 25 Gram(s) IV Push once  dextrose 50% Injectable 12.5 Gram(s) IV Push once  dextrose 50% Injectable 25 Gram(s) IV Push once  glucagon  Injectable 1 milliGRAM(s) IntraMuscular once  heparin   Injectable 5000 Unit(s) SubCutaneous every 8 hours  hydrALAZINE 50 milliGRAM(s) Oral every 8 hours  insulin glargine Injectable (LANTUS) 3 Unit(s) SubCutaneous at bedtime  insulin lispro (ADMELOG) corrective regimen sliding scale   SubCutaneous Before meals and at bedtime  iron sucrose IVPB 200 milliGRAM(s) IV Intermittent every 24 hours  levothyroxine 50 MICROGram(s) Oral daily  nicotine -  14 mG/24Hr(s) Patch 1 patch Transdermal daily  QUEtiapine 25 milliGRAM(s) Oral at bedtime  traZODone 100 milliGRAM(s) Oral at bedtime    MEDICATIONS  (PRN):  hydrALAZINE Injectable 10 milliGRAM(s) IV Push every 4 hours PRN systolic BP > 180      ALLERGIES:  Allergies    No Known Allergies    Intolerances        LABS:                        9.7    6.79  )-----------( 220      ( 11 Jan 2022 08:39 )             30.0     01-11    139  |  100  |  36<H>  ----------------------------<  179<H>  4.1   |  24  |  2.46<H>    Ca    9.8      11 Jan 2022 08:39  Phos  4.1     01-11  Mg     1.9     01-11    TPro  8.7<H>  /  Alb  4.3  /  TBili  0.2  /  DBili  x   /  AST  13  /  ALT  5<L>  /  AlkPhos  75  01-11        CAPILLARY BLOOD GLUCOSE      POCT Blood Glucose.: 182 mg/dL (12 Jan 2022 06:57)      RADIOLOGY & ADDITIONAL TESTS: Reviewed.

## 2022-01-12 NOTE — PROGRESS NOTE ADULT - PROBLEM SELECTOR PLAN 10
F: none, HD  E: HD  N: diabetic and renal diet  Ppx: hep subq  Dispo: F F: none, HD  E: Replete as needed   N: consistent carb/renal diet  Ppx: hep subq  Dispo: [facility]

## 2022-01-12 NOTE — PROGRESS NOTE ADULT - PROBLEM SELECTOR PLAN 1
On admission, patient significantly altered, with repetitive lip smacking and unable to participate in history. Mental status improved after dialysis, however patient still a bit confused. Able to state name and year, does not know location ([place]), knows [place] is president. Initially thought to be secondary to electrolyte derangements. CT neg. Intermittently speaking in Portuguese, thinks he lives in the hospital. Ammonia, syphillis, b12 neg    Plan:   - per patient's friend/HCP, patient is oriented at baseline. s/p head surgery in 2005 s/p fall. Has been admitted multiple times to [place]. Takes medications at home, but unknown which medications. Lives in a shelter. Pending further collateral from PCP  - may be due to uremia vs sepsis vs stroke (hospital course complicated by HTN) or may be patient's baseline MEG vs new onset CKD. Admission Cr 5.95, baseline Cr 1.7-1.8. AMS, urine w/ gross pus, appears euvolemic otherwise.  Unclear etiology, consider ATN vs paraproteinemia vs toxic ingestion vs retention.      - s/p emergent dialysis on 1/6, and again on 1/7  -  renal US w/ normal sized kidneys without echogenicity c/w acute process  - S/p self removal of HD cath on 1/7. No further need for HD as per nephro     Plan:   - continue to trend BMP  - monitor urine output   - hold home cymbalta and baclofen   - trend BMP, patient refusing labs intermittently  - monitor I&O  - avoid nephrotoxic agents

## 2022-01-12 NOTE — PROGRESS NOTE ADULT - PROBLEM SELECTOR PLAN 8
no evidence of bleeding. likely in setting of CKD  - iron panel: RON  - c/w ferrous sulfate 325 mg Po daily  - continue to monitor for signs of bleeding RESOLVED     Presented w/ altered mental status and repetitive lip smacking. Likely 2/2 electrolyte derangements in the setting of acute renal failure. Pt now at baseline s/p HD.     - Ammonia, syphilis, B12, folate wnl

## 2022-01-12 NOTE — PROGRESS NOTE ADULT - ASSESSMENT
62 y/o M HTN, DM complicated by neuropathy, EtOH liver cirrhosis, hx IVDU, toxic megacolon c/b pneumatosis s/p colectomy/ileostomy who presents with generalized weakness x 3 days found to have MEG, hyponatremia, hyperkalemia with altered mental status, now s/p 2 HD sessions. Initially on 7La, now stable for stepdown to F on 1/10 for dispo planning and further collateral regarding baseline mental status.

## 2022-01-12 NOTE — PROGRESS NOTE ADULT - PROBLEM SELECTOR PLAN 7
#s/p colon resection with ostomy   - patient with colostomy bag s/p colectomy for pneumatosis in setting of toxic megacolon;   - monitor output. UA grossly positive, patient c/o dysuria, UCx growing klebsiella sensitive to ceftriaxone  - s/p ceftriaxone 2g (1/7-1/12)

## 2022-01-12 NOTE — PROGRESS NOTE ADULT - PROBLEM SELECTOR PLAN 6
Hx DM2. 07/2021 A1c 8.1%. Home meds Latesha Bustillos  - A1c 6.7  -Started on Lantus 3u  - mISS  -continue to monitor blood glucose s/p colon resection with ostomy     - patient with colostomy bag s/p colectomy for pneumatosis in setting of toxic megacolon;   - monitor output.

## 2022-01-12 NOTE — PROGRESS NOTE ADULT - PROBLEM SELECTOR PLAN 2
MEG vs new onset CKD. Admission Cr 5.95, baseline Cr 1.7-1.8. AMS, urine w/ gross pus, appears euvolemic otherwise.  unclear etiology, ATN vs paraproteinemia vs toxic ingestion vs retention. Creat as high as 6.52. as low as 2.87, had gone back up, now downtrending to 2.95 today   s/p emergent dialysis on 1/6, and again on 1/7  --initially anuric and made some urine (approximately 600cc this AM). Currently retaining; soliz placement likely PM 1/11  - renal US: Nl sized kidneys without echogenicity c/w acute process  - S/p self removal of HD cath on 1/7, making urine likely recovering as per Nephro no need for further emergent HD  - hold home cymbalta and baclofen   - trend BMP, patient refusing labs intermittently  - monitor I&O  - avoid nephrotoxic agents  - pending further recommendations from Nephro On hctz and amlodipine at home.     - Medications initially held due to concern for sepsis.   - Pt then resumed amlodipine and started hydralazine 25mg q8 after episodes of hypertension  - Hydralazine increased to 50mg q8     Plan:  - c/w hydralazine 50mg q8  - C/w amlodipine 10 qd  - Consider restarting home dose HCTZ 25 mg QD  - Hold lisinopril i/s/o MEG

## 2022-01-12 NOTE — PROGRESS NOTE ADULT - PROBLEM SELECTOR PLAN 3
Hx of HTN, home med of amlodipine and lisninopril initially held with concern for sepsis. Once stable, started on amlodipine 5, had multiple episodes of elevated BP requiring IV meds, first given labetalol, then hydral. Now on hydral 25 q8    Plan:  - Increased to hydral 50 q8  - C/w amlodipine 10 qd  - BP now improving, continue to monitor  - Consider restarting home dose HCTZ 25 mg QD Hx DM2. 07/2021 A1c 8.1%. Home meds Latesha Bustillos    - A1c 6.7  -c/w  Lantus 3u  - mISS  -continue to monitor blood glucose

## 2022-01-12 NOTE — PROGRESS NOTE ADULT - PROBLEM SELECTOR PLAN 5
pt with episodes of bradycardia. Cardiology following and likely in setting of electrolyte derangements 2/2 to renal failure. s/p atropine 0.5 mg x2.  started on dopamine gtt while receiving HD, now off pressors HR actually stable last few days  - TTE unremarkable  - cards following, appreciate recs c/w home atorvastatin 10 mg QD

## 2022-01-13 LAB
ANION GAP SERPL CALC-SCNC: 13 MMOL/L — SIGNIFICANT CHANGE UP (ref 5–17)
BUN SERPL-MCNC: 42 MG/DL — HIGH (ref 7–23)
CALCIUM SERPL-MCNC: 9.1 MG/DL — SIGNIFICANT CHANGE UP (ref 8.4–10.5)
CHLORIDE SERPL-SCNC: 102 MMOL/L — SIGNIFICANT CHANGE UP (ref 96–108)
CO2 SERPL-SCNC: 21 MMOL/L — LOW (ref 22–31)
CREAT SERPL-MCNC: 2.16 MG/DL — HIGH (ref 0.5–1.3)
GLUCOSE BLDC GLUCOMTR-MCNC: 175 MG/DL — HIGH (ref 70–99)
GLUCOSE BLDC GLUCOMTR-MCNC: 183 MG/DL — HIGH (ref 70–99)
GLUCOSE BLDC GLUCOMTR-MCNC: 246 MG/DL — HIGH (ref 70–99)
GLUCOSE BLDC GLUCOMTR-MCNC: 282 MG/DL — HIGH (ref 70–99)
GLUCOSE BLDC GLUCOMTR-MCNC: 322 MG/DL — HIGH (ref 70–99)
GLUCOSE SERPL-MCNC: 211 MG/DL — HIGH (ref 70–99)
HCT VFR BLD CALC: 27.8 % — LOW (ref 39–50)
HGB BLD-MCNC: 8.9 G/DL — LOW (ref 13–17)
MAGNESIUM SERPL-MCNC: 1.8 MG/DL — SIGNIFICANT CHANGE UP (ref 1.6–2.6)
MCHC RBC-ENTMCNC: 28.8 PG — SIGNIFICANT CHANGE UP (ref 27–34)
MCHC RBC-ENTMCNC: 32 GM/DL — SIGNIFICANT CHANGE UP (ref 32–36)
MCV RBC AUTO: 90 FL — SIGNIFICANT CHANGE UP (ref 80–100)
NRBC # BLD: 0 /100 WBCS — SIGNIFICANT CHANGE UP (ref 0–0)
PHOSPHATE SERPL-MCNC: 4.1 MG/DL — SIGNIFICANT CHANGE UP (ref 2.5–4.5)
PLATELET # BLD AUTO: 191 K/UL — SIGNIFICANT CHANGE UP (ref 150–400)
POTASSIUM SERPL-MCNC: 3.8 MMOL/L — SIGNIFICANT CHANGE UP (ref 3.5–5.3)
POTASSIUM SERPL-SCNC: 3.8 MMOL/L — SIGNIFICANT CHANGE UP (ref 3.5–5.3)
RBC # BLD: 3.09 M/UL — LOW (ref 4.2–5.8)
RBC # FLD: 13.6 % — SIGNIFICANT CHANGE UP (ref 10.3–14.5)
SODIUM SERPL-SCNC: 136 MMOL/L — SIGNIFICANT CHANGE UP (ref 135–145)
WBC # BLD: 7.36 K/UL — SIGNIFICANT CHANGE UP (ref 3.8–10.5)
WBC # FLD AUTO: 7.36 K/UL — SIGNIFICANT CHANGE UP (ref 3.8–10.5)

## 2022-01-13 PROCEDURE — 99233 SBSQ HOSP IP/OBS HIGH 50: CPT | Mod: GC

## 2022-01-13 RX ADMIN — Medication 4: at 13:30

## 2022-01-13 RX ADMIN — TAMSULOSIN HYDROCHLORIDE 0.4 MILLIGRAM(S): 0.4 CAPSULE ORAL at 21:18

## 2022-01-13 RX ADMIN — Medication 50 MILLIGRAM(S): at 07:18

## 2022-01-13 RX ADMIN — Medication 50 MICROGRAM(S): at 07:18

## 2022-01-13 RX ADMIN — Medication 1 PATCH: at 07:14

## 2022-01-13 RX ADMIN — INSULIN GLARGINE 3 UNIT(S): 100 INJECTION, SOLUTION SUBCUTANEOUS at 21:18

## 2022-01-13 RX ADMIN — Medication 1: at 21:20

## 2022-01-13 RX ADMIN — Medication 100 MILLIGRAM(S): at 21:17

## 2022-01-13 RX ADMIN — AMLODIPINE BESYLATE 10 MILLIGRAM(S): 2.5 TABLET ORAL at 13:28

## 2022-01-13 RX ADMIN — Medication 1 PATCH: at 13:28

## 2022-01-13 RX ADMIN — Medication 1: at 07:17

## 2022-01-13 RX ADMIN — Medication 50 MILLIGRAM(S): at 13:27

## 2022-01-13 RX ADMIN — HEPARIN SODIUM 5000 UNIT(S): 5000 INJECTION INTRAVENOUS; SUBCUTANEOUS at 07:18

## 2022-01-13 RX ADMIN — ATORVASTATIN CALCIUM 10 MILLIGRAM(S): 80 TABLET, FILM COATED ORAL at 21:17

## 2022-01-13 RX ADMIN — Medication 50 MILLIGRAM(S): at 21:18

## 2022-01-13 RX ADMIN — QUETIAPINE FUMARATE 25 MILLIGRAM(S): 200 TABLET, FILM COATED ORAL at 21:17

## 2022-01-13 RX ADMIN — HEPARIN SODIUM 5000 UNIT(S): 5000 INJECTION INTRAVENOUS; SUBCUTANEOUS at 13:28

## 2022-01-13 RX ADMIN — Medication 3: at 17:38

## 2022-01-13 RX ADMIN — HEPARIN SODIUM 5000 UNIT(S): 5000 INJECTION INTRAVENOUS; SUBCUTANEOUS at 21:18

## 2022-01-13 RX ADMIN — Medication 1 PATCH: at 18:33

## 2022-01-13 NOTE — PROGRESS NOTE ADULT - PROBLEM SELECTOR PLAN 6
s/p colon resection with ostomy     - patient with colostomy bag s/p colectomy for pneumatosis in setting of toxic megacolon;   - monitor output.

## 2022-01-13 NOTE — PROGRESS NOTE ADULT - PROBLEM SELECTOR PLAN 2
On hctz and amlodipine at home.     - Medications initially held due to concern for sepsis.   - Pt then resumed amlodipine and started hydralazine 25mg q8 after episodes of hypertension  - Hydralazine increased to 50mg q8     Plan:  - c/w hydralazine 50mg q8  - C/w amlodipine 10 qd  - Consider restarting home dose HCTZ 25 mg QD  - Hold lisinopril i/s/o MEG

## 2022-01-13 NOTE — PROGRESS NOTE ADULT - PROBLEM SELECTOR PLAN 1
MEG vs new onset CKD. Admission Cr 5.95, baseline Cr 1.7-1.8. AMS, urine w/ gross pus, appears euvolemic otherwise.  Unclear etiology, consider ATN vs paraproteinemia vs toxic ingestion vs retention.      - s/p emergent dialysis on 1/6, and again on 1/7  -  renal US w/ normal sized kidneys without echogenicity c/w acute process  - S/p self removal of HD cath on 1/7. No further need for HD as per nephro   - Voiding via condom cath    Plan:   - continue to trend BMP  - monitor urine output   - hold home cymbalta and baclofen   - avoid nephrotoxic agents  - c/w flomax 0.4 Qd

## 2022-01-13 NOTE — PROGRESS NOTE ADULT - PROBLEM SELECTOR PLAN 7
UA grossly positive, patient c/o dysuria, UCx growing klebsiella sensitive to ceftriaxone  - s/p ceftriaxone 2g (1/7-1/12)

## 2022-01-13 NOTE — PROGRESS NOTE ADULT - SUBJECTIVE AND OBJECTIVE BOX
Physical Medicine and Rehabilitation Progress Note:    Patient is a 61y old  Male who presents with a chief complaint of electrolyte abnormalities (13 Jan 2022 07:31)      HPI:  60yo M hx of ?etoh cirrhosis, HTN, DM2 with neuropathy, hx of toxic megacolon c/b pneumatosis s/p colectomy and ileostomy, admission 6 months ago for hyperkalemia and hyponatremia, presents with generalized weakness and lightheadedness.  Pt is poor historian. Unable to answer when last alcoholic drink was or if active drinker. Unable to obtain ROS.      ED course  Vitals: 97.7F, HR 65, BP 82/52, RR 18 sat 95% RA  Labs: WBC 11, Hb 11.0, K 8.0, Na 119, Cl 90, HCO3 17, Cr 5.95, BUN 72, UA+ nitrite & bacteria, pH 7.10 pco2 46, po2 38  Imaging: CXR w/o consolidation or infiltrates, EKG peaked T waves, left axis deviation  Interventions: calcium gluconate 2g, regular insulin 10u x 3, 3 amp d50, NaHCO3 50meq, CTX 1g, albuterol neb x2, 1L NS bolus x2, lasix 40mg IVP x1 (06 Jan 2022 05:05)                            8.9    7.36  )-----------( 191      ( 13 Jan 2022 06:26 )             27.8       01-13    136  |  102  |  42<H>  ----------------------------<  211<H>  3.8   |  21<L>  |  2.16<H>    Ca    9.1      13 Jan 2022 06:26  Phos  4.1     01-13  Mg     1.8     01-13    TPro  8.2  /  Alb  4.1  /  TBili  0.2  /  DBili  x   /  AST  12  /  ALT  5<L>  /  AlkPhos  70  01-12    Vital Signs Last 24 Hrs  T(C): 36.7 (13 Jan 2022 08:40), Max: 37.2 (13 Jan 2022 05:00)  T(F): 98.1 (13 Jan 2022 08:40), Max: 98.9 (13 Jan 2022 05:00)  HR: 84 (13 Jan 2022 08:40) (84 - 94)  BP: 138/72 (13 Jan 2022 08:40) (137/74 - 156/76)  BP(mean): 95 (13 Jan 2022 05:00) (95 - 103)  RR: 16 (13 Jan 2022 08:40) (16 - 18)  SpO2: 97% (13 Jan 2022 08:40) (96% - 97%)    MEDICATIONS  (STANDING):  amLODIPine   Tablet 10 milliGRAM(s) Oral every 24 hours  atorvastatin 10 milliGRAM(s) Oral at bedtime  dextrose 40% Gel 15 Gram(s) Oral once  dextrose 5%. 1000 milliLiter(s) (50 mL/Hr) IV Continuous <Continuous>  dextrose 5%. 1000 milliLiter(s) (100 mL/Hr) IV Continuous <Continuous>  dextrose 50% Injectable 25 Gram(s) IV Push once  dextrose 50% Injectable 12.5 Gram(s) IV Push once  dextrose 50% Injectable 25 Gram(s) IV Push once  glucagon  Injectable 1 milliGRAM(s) IntraMuscular once  heparin   Injectable 5000 Unit(s) SubCutaneous every 8 hours  hydrALAZINE 50 milliGRAM(s) Oral every 8 hours  insulin glargine Injectable (LANTUS) 3 Unit(s) SubCutaneous at bedtime  insulin lispro (ADMELOG) corrective regimen sliding scale   SubCutaneous Before meals and at bedtime  levothyroxine 50 MICROGram(s) Oral daily  nicotine -  14 mG/24Hr(s) Patch 1 patch Transdermal daily  QUEtiapine 25 milliGRAM(s) Oral at bedtime  tamsulosin 0.4 milliGRAM(s) Oral at bedtime  traZODone 100 milliGRAM(s) Oral at bedtime    MEDICATIONS  (PRN):  hydrALAZINE Injectable 10 milliGRAM(s) IV Push every 4 hours PRN systolic BP > 180    Currently Undergoing Physical/ Occupational Therapy at bedside.    Functional Status Assessment:   1/12/2022      Cognitive/Neuro/Behavioral  Cognitive/Neuro/Behavioral [WDL Definition: Alert; opens eyes spontaneously; arouses to voice or touch; oriented x 4; follows commands; speech spontaneous, logical; purposeful motor response; behavior appropriate to situation]: WDL except  Level of Consciousness: confused;  alert  Arousal Level: arouses to voice;  arouses to touch/gentle shaking  Orientation: disoriented to;  place;  time  Speech: clear  Mood/Behavior: calm;  cooperative    Language Assistance  Preferred Language to Address Healthcare Preferred Language to Address Healthcare: English    Therapeutic Interventions      Bed Mobility  Bed Mobility Training Rolling/Turning: contact guard;  verbal cues;  bed rails  Bed Mobility Training Scooting: contact guard;  verbal cues  Bed Mobility Training Sit-to-Supine: contact guard;  verbal cues  Bed Mobility Training Supine-to-Sit: contact guard;  verbal cues  Bed Mobility Training Limitations: decreased ability to use arms for pushing/pulling;  impaired ability to control trunk for mobility;  decreased ability to use legs for bridging/pushing;  decreased ROM;  decreased flexibility;  decreased strength;  impaired balance;  impaired postural control;  Demo fair strength with bed mobility, requiring increased time for transfers 2/2 confusion    Sit-Stand Transfer Training  Transfer Training Sit-to-Stand Transfer: minimum assist (75% patient effort);  verbal cues;  rolling walker;  2 person assist  Transfer Training Stand-to-Sit Transfer: minimum assist (75% patient effort);  verbal cues;  rolling walker;  1 person assist  Sit-to-Stand Transfer Training Transfer Safety Analysis: decreased balance;  decreased cognition;  Demo fair eccentric control with descent, able to stand for ~2 min before asking to sit EOB. Assist for full knee and hip extension and to prevent LOB as patient unsteady in standing;  decreased flexibility;  decreased ROM;  decreased strength;  impaired balance;  impaired postural control;  rolling walker    Gait Training  Gait Training: moderate assist (50% patient effort);  2 person assist;  verbal cues;  rolling walker;  ~3-4 feet sidesteps EOB  Gait Analysis: decreased kat;  crouch;  increased time in double stance;  decreased velocity of limb motion;  shuffling;  decreased step length;  decreased stride length;  decreased weight-shifting ability;  Demo fair strength, balance, and endurance during EOB ambulation trial with RW and 2 person assist with unsteady gait ;  decreased flexibility;  decreased ROM;  decreased strength;  impaired balance;  impaired postural control;  ~3-4 feet sidesteps EOB;  rolling walker  Gait Number of Times:: x 1  Type of Rest Type of Rest: sitting  Duration of Rest Duration of Rest: ~2 min     Therapeutic Exercise  Therapeutic Exercise Charges: (B) supine straight leg raise 1x10 reps to increase LE and prepare for transfers and ambulation           PM&R Impression: as above    Current Disposition Plan Recommendations:    subacute rehab placement

## 2022-01-13 NOTE — PROGRESS NOTE ADULT - ASSESSMENT
62 y/o M HTN, DM complicated by neuropathy, EtOH liver cirrhosis, hx IVDU, toxic megacolon c/b pneumatosis s/p colectomy/ileostomy who presents with generalized weakness x 3 days found to have MEG, hyponatremia, hyperkalemia with altered mental status, now s/p 2 HD sessions. Initially on 7La now on RMF  for dispo planning

## 2022-01-13 NOTE — PROGRESS NOTE ADULT - PROBLEM SELECTOR PLAN 4
Iron studies consistent w/ iron deficiency anemia.     - trend  - continue to monitor for signs of bleeding

## 2022-01-13 NOTE — PROGRESS NOTE ADULT - SUBJECTIVE AND OBJECTIVE BOX
OVERNIGHT EVENTS: NAEO    SUBJECTIVE / INTERVAL HPI: Patient seen and examined at bedside. c/o R toe pain and mild left sided abdominal discomfort      PHYSICAL EXAM:  General: NAD, lying comfortably in bed   HEENT: Anicteric sclera, EOMI, MMM  Cardiovascular: +S1/S2; RRR; No JVD   Respiratory: CTAB, no accessory muscle use  Gastrointestinal: soft, non-distended, no tenderness to palpation, colostomy bag clean dry w/ semi solid stool  Extremities: WWP; no edema, no erythema, tenderness of R foot  Vascular: 2+ radial, DP/PT pulses B/L  Neurological: AAOx3; mild tremor present while resting in chair, 4/5 strength in bilateral upper and lower extremities   Skin: No visible rash, skin discoloration    VITAL SIGNS:  Vital Signs Last 24 Hrs  T(C): 36.7 (13 Jan 2022 08:40), Max: 37.2 (13 Jan 2022 05:00)  T(F): 98.1 (13 Jan 2022 08:40), Max: 98.9 (13 Jan 2022 05:00)  HR: 84 (13 Jan 2022 08:40) (84 - 94)  BP: 138/72 (13 Jan 2022 08:40) (137/74 - 156/76)  BP(mean): 95 (13 Jan 2022 05:00) (95 - 103)  RR: 16 (13 Jan 2022 08:40) (16 - 18)  SpO2: 97% (13 Jan 2022 08:40) (96% - 97%)      MEDICATIONS:  MEDICATIONS  (STANDING):  amLODIPine   Tablet 10 milliGRAM(s) Oral every 24 hours  atorvastatin 10 milliGRAM(s) Oral at bedtime  dextrose 40% Gel 15 Gram(s) Oral once  dextrose 5%. 1000 milliLiter(s) (50 mL/Hr) IV Continuous <Continuous>  dextrose 5%. 1000 milliLiter(s) (100 mL/Hr) IV Continuous <Continuous>  dextrose 50% Injectable 25 Gram(s) IV Push once  dextrose 50% Injectable 12.5 Gram(s) IV Push once  dextrose 50% Injectable 25 Gram(s) IV Push once  glucagon  Injectable 1 milliGRAM(s) IntraMuscular once  heparin   Injectable 5000 Unit(s) SubCutaneous every 8 hours  hydrALAZINE 50 milliGRAM(s) Oral every 8 hours  insulin glargine Injectable (LANTUS) 3 Unit(s) SubCutaneous at bedtime  insulin lispro (ADMELOG) corrective regimen sliding scale   SubCutaneous Before meals and at bedtime  levothyroxine 50 MICROGram(s) Oral daily  nicotine -  14 mG/24Hr(s) Patch 1 patch Transdermal daily  QUEtiapine 25 milliGRAM(s) Oral at bedtime  tamsulosin 0.4 milliGRAM(s) Oral at bedtime  traZODone 100 milliGRAM(s) Oral at bedtime    MEDICATIONS  (PRN):  hydrALAZINE Injectable 10 milliGRAM(s) IV Push every 4 hours PRN systolic BP > 180      ALLERGIES:  Allergies    No Known Allergies    Intolerances        LABS:                        8.9    7.36  )-----------( 191      ( 13 Jan 2022 06:26 )             27.8     01-13    136  |  102  |  42<H>  ----------------------------<  211<H>  3.8   |  21<L>  |  2.16<H>    Ca    9.1      13 Jan 2022 06:26  Phos  4.1     01-13  Mg     1.8     01-13    TPro  8.2  /  Alb  4.1  /  TBili  0.2  /  DBili  x   /  AST  12  /  ALT  5<L>  /  AlkPhos  70  01-12        CAPILLARY BLOOD GLUCOSE      POCT Blood Glucose.: 322 mg/dL (13 Jan 2022 13:16)      RADIOLOGY & ADDITIONAL TESTS: Reviewed.

## 2022-01-13 NOTE — PROGRESS NOTE ADULT - ASSESSMENT
per Internal Medicine    60 y/o M HTN, DM complicated by neuropathy, EtOH liver cirrhosis, hx IVDU, toxic megacolon c/b pneumatosis s/p colectomy/ileostomy who presents with generalized weakness x 3 days found to have MEG, hyponatremia, hyperkalemia with altered mental status, now s/p 2 HD sessions. Initially on 7La now on RMF  for dispo planning      Problem/Plan - 1:  ·  Problem: MEG (acute kidney injury).   ·  Plan: MEG vs new onset CKD. Admission Cr 5.95, baseline Cr 1.7-1.8. AMS, urine w/ gross pus, appears euvolemic otherwise.  Unclear etiology, consider ATN vs paraproteinemia vs toxic ingestion vs retention.      - s/p emergent dialysis on 1/6, and again on 1/7  -  renal US w/ normal sized kidneys without echogenicity c/w acute process  - S/p self removal of HD cath on 1/7. No further need for HD as per nephro   - Voiding via condom cath    Plan:   - continue to trend BMP  - monitor urine output   - hold home cymbalta and baclofen   - avoid nephrotoxic agents  - c/w flomax 0.4 Qd.    Problem/Plan - 2:  ·  Problem: Hypertension.   ·  Plan: On hctz and amlodipine at home.     - Medications initially held due to concern for sepsis.   - Pt then resumed amlodipine and started hydralazine 25mg q8 after episodes of hypertension  - Hydralazine increased to 50mg q8     Plan:  - c/w hydralazine 50mg q8  - C/w amlodipine 10 qd  - Consider restarting home dose HCTZ 25 mg QD  - Hold lisinopril i/s/o MEG.    Problem/Plan - 3:  ·  Problem: DM (diabetes mellitus).   ·  Plan: Hx DM2. 07/2021 A1c 8.1%. Home meds Janshwetat Levemir    - A1c 6.7  -c/w  Lantus 3u  - mISS  -continue to monitor blood glucose.    Problem/Plan - 4:  ·  Problem: Anemia.   ·  Plan: Iron studies consistent w/ iron deficiency anemia.     - trend  - continue to monitor for signs of bleeding.    Problem/Plan - 5:  ·  Problem: Hyperlipidemia.   ·  Plan: c/w home atorvastatin 10 mg QD.    Problem/Plan - 6:  ·  Problem: Colostomy in place.   ·  Plan: s/p colon resection with ostomy     - patient with colostomy bag s/p colectomy for pneumatosis in setting of toxic megacolon;   - monitor output.    Problem/Plan - 7:  ·  Problem: Urinary tract infection.   ·  Plan: UA grossly positive, patient c/o dysuria, UCx growing klebsiella sensitive to ceftriaxone  - s/p ceftriaxone 2g (1/7-1/12).    Problem/Plan - 8:  ·  Problem: Metabolic encephalopathy.   ·  Plan: RESOLVED     Presented w/ altered mental status and repetitive lip smacking. Likely 2/2 electrolyte derangements in the setting of acute renal failure. Pt now at baseline s/p HD.     - Ammonia, syphilis, B12, folate wnl.    Problem/Plan - 9:  ·  Problem: Bradycardia.   ·  Plan: RESOLVED     pt with episodes of bradycardia. Cardiology following and likely in setting of electrolyte derangements 2/2 to renal failure. s/p atropine 0.5 mg x2.  s/p dopamine gtt while receiving HD, now off pressors     - TTE unremarkable  - cards following, appreciate recs  - No episodes of bradycardia since 1/6.    Problem/Plan - 10:  ·  Problem: Nutrition, metabolism, and development symptoms.   ·  Plan; F: none, HD  E: Replete as needed   N: consistent carb/renal diet  Ppx: hep subq  Dispo: RMF.

## 2022-01-13 NOTE — PROGRESS NOTE ADULT - PROBLEM SELECTOR PLAN 8
RESOLVED     Presented w/ altered mental status and repetitive lip smacking. Likely 2/2 electrolyte derangements in the setting of acute renal failure. Pt now at baseline s/p HD.     - Ammonia, syphilis, B12, folate wnl

## 2022-01-13 NOTE — CHART NOTE - NSCHARTNOTEFT_GEN_A_CORE
Admitting Diagnosis:   Patient is a 61y old  Male who presents with a chief complaint of electrolyte abnormalities (13 Jan 2022 07:31)      PAST MEDICAL & SURGICAL HISTORY:  DM (diabetes mellitus)    HTN (hypertension)    HLD (hyperlipidemia)    ETOH abuse    Anemia    History of colectomy    History of ileostomy        Current Nutrition Order: Renal-no protein, no con k, no conc phos, low sodum; consistent carbohydrate        PO Intake: Good (%) [   ]  Fair (50-75%) [  x ] Poor (<25%) [   ]    GI Issues:  Illeostomy 50ml ouput (1/13/2022)    Pain: Denies discomfort/pain as per RN     Skin Integrity: Kendell Scale 17; R-heel diabetic ulcer; R third toe amputation    Labs:   01-13    136  |  102  |  42<H>  ----------------------------<  211<H>  3.8   |  21<L>  |  2.16<H>    Ca    9.1      13 Jan 2022 06:26  Phos  4.1     01-13  Mg     1.8     01-13    TPro  8.2  /  Alb  4.1  /  TBili  0.2  /  DBili  x   /  AST  12  /  ALT  5<L>  /  AlkPhos  70  01-12    CAPILLARY BLOOD GLUCOSE      POCT Blood Glucose.: 322 mg/dL (13 Jan 2022 13:16)  POCT Blood Glucose.: 175 mg/dL (13 Jan 2022 06:39)  POCT Blood Glucose.: 193 mg/dL (12 Jan 2022 22:06)  POCT Blood Glucose.: 284 mg/dL (12 Jan 2022 16:48)      Medications:  MEDICATIONS  (STANDING):  amLODIPine   Tablet 10 milliGRAM(s) Oral every 24 hours  atorvastatin 10 milliGRAM(s) Oral at bedtime  dextrose 40% Gel 15 Gram(s) Oral once  dextrose 5%. 1000 milliLiter(s) (50 mL/Hr) IV Continuous <Continuous>  dextrose 5%. 1000 milliLiter(s) (100 mL/Hr) IV Continuous <Continuous>  dextrose 50% Injectable 25 Gram(s) IV Push once  dextrose 50% Injectable 12.5 Gram(s) IV Push once  dextrose 50% Injectable 25 Gram(s) IV Push once  glucagon  Injectable 1 milliGRAM(s) IntraMuscular once  heparin   Injectable 5000 Unit(s) SubCutaneous every 8 hours  hydrALAZINE 50 milliGRAM(s) Oral every 8 hours  insulin glargine Injectable (LANTUS) 3 Unit(s) SubCutaneous at bedtime  insulin lispro (ADMELOG) corrective regimen sliding scale   SubCutaneous Before meals and at bedtime  levothyroxine 50 MICROGram(s) Oral daily  nicotine -  14 mG/24Hr(s) Patch 1 patch Transdermal daily  QUEtiapine 25 milliGRAM(s) Oral at bedtime  tamsulosin 0.4 milliGRAM(s) Oral at bedtime  traZODone 100 milliGRAM(s) Oral at bedtime    MEDICATIONS  (PRN):  hydrALAZINE Injectable 10 milliGRAM(s) IV Push every 4 hours PRN systolic BP > 180    Admission anthropometrics:   Wt: 164lb 66" BMI: 26.6 IBW: 142lb %IBW: 115%    1/6/2022: 148.1LB  1/7/2022: 145.5LB    Weight Change: Admission weight was 164lbs however 1/6 weight is more reflective with CBW.      Nutrition Focused Physical Exam: Completed [   ]  Not Pertinent [ x  ]    Estimated energy needs:   ABW (65kg) used to calculate estimated needs since %IBW is between %. Estimated needs based on [facility] standard of care and needs adjusted based on non-HD.     1625-2275kcal (25-35kcal/kg)   52g -65g protein (0.8-1.0g/kg  Fluids determined per team     Subjective: 62 y/o M HTN, DM complicated by neuropathy, EtOH liver cirrhosis, hx IVDU, toxic megacolon c/b pneumatosis s/p colectomy/ileostomy who presents with generalized weakness x 3 days found to have MEG, hyponatremia, hyperkalemia with altered mental status, now s/p 2 HD sessions. Initially on 7La, now stable for stepdown to F for dispo planning and further collateral regarding baseline mental status. As per MD, pt self removed HD cath on 1/7. As per, nephrology 1/11/2021, MEG is resolving and no need for dialysis;  BP uncontrolled and now starting HCTZ 25mg. Receiving IV iron 200mg x 5 days. Noted with Cr levels improving.     Pt seen in room resting in bed, currently A&O x 1, with his aid and RN in room. Unable to obtain subjective information from patient but able to receive information from pts aid. Reported that he consumed 100% of his 1/12/2022 breakfast, 50% of his lunch and consumed today's breakfast 100%. As per nephrologist, pt MEG is improving including Cr levels. Labs presents with Na, phos, K, are WNL. Will rec to d/c renal diet and provide a protein restricted diet and TLC for low sodium due to uncontrolled BP. Provided diet education to aid regarding a protein restricted diet due to MEG and d'cd HD.     Previous Nutrition Diagnosis: Increased nutrient needs RT hypermetabolic state AEB ARF, need for emergent HD     Active [   ]  Resolved [  x ]    If resolved, new PES: Excessive protein intake r/t current order of Renal diet-no protein restriction AEB intake less than recommended amounts     Goal: Consume % of meals with appropriate protein recommendations.     Recommendations:  1. d/c renal diet and provide 60g protein diet for protein restriction; and TLC diet to control BP; 2. Monitor PO intake 3. Monitor ileostomy 4:     Education: Provided diet education to aid regarding a protein restricted diet due to d'cd HD.     Risk Level: High [   ] Moderate [ x  ] Low [   ] Admitting Diagnosis:   Patient is a 61y old  Male who presents with a chief complaint of electrolyte abnormalities (13 Jan 2022 07:31)      PAST MEDICAL & SURGICAL HISTORY:  DM (diabetes mellitus)    HTN (hypertension)    HLD (hyperlipidemia)    ETOH abuse    Anemia    History of colectomy    History of ileostomy        Current Nutrition Order: Renal-no protein, no con k, no conc phos, low sodum; consistent carbohydrate        PO Intake: Good (%) [   ]  Fair (50-75%) [  x ] Poor (<25%) [   ]    GI Issues:  Illeostomy 50ml ouput (1/13/2022)    Pain: Denies discomfort/pain as per RN     Skin Integrity: Kendell Scale 17; R-heel diabetic ulcer; R third toe amputation    Labs:   01-13    136  |  102  |  42<H>  ----------------------------<  211<H>  3.8   |  21<L>  |  2.16<H>    Ca    9.1      13 Jan 2022 06:26  Phos  4.1     01-13  Mg     1.8     01-13    TPro  8.2  /  Alb  4.1  /  TBili  0.2  /  DBili  x   /  AST  12  /  ALT  5<L>  /  AlkPhos  70  01-12    CAPILLARY BLOOD GLUCOSE      POCT Blood Glucose.: 322 mg/dL (13 Jan 2022 13:16)  POCT Blood Glucose.: 175 mg/dL (13 Jan 2022 06:39)  POCT Blood Glucose.: 193 mg/dL (12 Jan 2022 22:06)  POCT Blood Glucose.: 284 mg/dL (12 Jan 2022 16:48)      Medications:  MEDICATIONS  (STANDING):  amLODIPine   Tablet 10 milliGRAM(s) Oral every 24 hours  atorvastatin 10 milliGRAM(s) Oral at bedtime  dextrose 40% Gel 15 Gram(s) Oral once  dextrose 5%. 1000 milliLiter(s) (50 mL/Hr) IV Continuous <Continuous>  dextrose 5%. 1000 milliLiter(s) (100 mL/Hr) IV Continuous <Continuous>  dextrose 50% Injectable 25 Gram(s) IV Push once  dextrose 50% Injectable 12.5 Gram(s) IV Push once  dextrose 50% Injectable 25 Gram(s) IV Push once  glucagon  Injectable 1 milliGRAM(s) IntraMuscular once  heparin   Injectable 5000 Unit(s) SubCutaneous every 8 hours  hydrALAZINE 50 milliGRAM(s) Oral every 8 hours  insulin glargine Injectable (LANTUS) 3 Unit(s) SubCutaneous at bedtime  insulin lispro (ADMELOG) corrective regimen sliding scale   SubCutaneous Before meals and at bedtime  levothyroxine 50 MICROGram(s) Oral daily  nicotine -  14 mG/24Hr(s) Patch 1 patch Transdermal daily  QUEtiapine 25 milliGRAM(s) Oral at bedtime  tamsulosin 0.4 milliGRAM(s) Oral at bedtime  traZODone 100 milliGRAM(s) Oral at bedtime    MEDICATIONS  (PRN):  hydrALAZINE Injectable 10 milliGRAM(s) IV Push every 4 hours PRN systolic BP > 180    Admission anthropometrics:   Wt: 164lb 66" BMI: 26.6 IBW: 142lb %IBW: 115%    1/6/2022: 148.1LB  1/7/2022: 145.5LB    Weight Change: Admission weight was 164lbs however 1/6 weight is more reflective with CBW.      Nutrition Focused Physical Exam: Completed [   ]  Not Pertinent [ x  ]    Estimated energy needs:   ABW (65kg) used to calculate estimated needs since %IBW is between %. Estimated needs based on [facility] standard of care and needs adjusted based on non-HD.     1625-2275kcal (25-35kcal/kg)   52g -65g protein (0.8-1.0g/kg  Fluids determined per team     Subjective: 60 y/o M HTN, DM complicated by neuropathy, EtOH liver cirrhosis, hx IVDU, toxic megacolon c/b pneumatosis s/p colectomy/ileostomy who presents with generalized weakness x 3 days found to have MEG, hyponatremia, hyperkalemia with altered mental status, now s/p 2 HD sessions. Initially on 7La, now stable for stepdown to F for dispo planning and further collateral regarding baseline mental status. As per MD, pt self removed HD cath on 1/7. As per, nephrology 1/11/2021, MEG is resolving and no need for dialysis;  BP uncontrolled and now starting HCTZ 25mg. Receiving IV iron 200mg x 5 days. Noted with Cr levels improving.     Pt seen in room resting in bed, currently A&O x 1, with his aid and RN in room. Unable to obtain subjective information from patient but able to receive information from pts aid. Reported that he consumed 100% of his 1/12/2022 breakfast, 50% of his lunch and consumed today's breakfast 100%. As per nephrologist, pt MEG is improving including Cr levels. Labs presents with Na, phos, K, are WNL. Will rec to d/c renal diet and provide a protein restricted diet and TLC for low sodium due to uncontrolled BP. Provided diet education to aid regarding a protein restricted diet due to MEG and d'cd HD.     Previous Nutrition Diagnosis: Increased nutrient needs RT hypermetabolic state AEB ARF, need for emergent HD     Active [   ]  Resolved [  x ]    If resolved, new PES: Excessive protein intake r/t current order of Renal diet-no protein restriction AEB intake less than recommended amounts     Goal: Consume % of meals with appropriate protein recommendations.     Recommendations:  1. d/c renal diet and provide 60g protein diet for protein restriction; and TLC diet to control BP; 2. Monitor PO intake 3. Monitor ileostomy 4:     Education: Provided diet education to aid regarding a protein restricted diet due to d'cd HD.     Risk Level: High [  x ] Moderate [   ] Low [   ] Admitting Diagnosis:   Patient is a 61y old  Male who presents with a chief complaint of electrolyte abnormalities (13 Jan 2022 07:31)      PAST MEDICAL & SURGICAL HISTORY:  DM (diabetes mellitus)    HTN (hypertension)    HLD (hyperlipidemia)    ETOH abuse    Anemia    History of colectomy    History of ileostomy        Current Nutrition Order: Renal-no protein, no con k, no conc phos, low sodum; consistent carbohydrate        PO Intake: Good (%) [   ]  Fair (50-75%) [  x ] Poor (<25%) [   ]    GI Issues:  Illeostomy 50ml ouput (1/13/2022)    Pain: Denies discomfort/pain as per RN     Skin Integrity: Kendell Scale 17; R-heel diabetic ulcer; R third toe amputation; no edema noted     Labs:   01-13    136  |  102  |  42<H>  ----------------------------<  211<H>  3.8   |  21<L>  |  2.16<H>    Ca    9.1      13 Jan 2022 06:26  Phos  4.1     01-13  Mg     1.8     01-13    TPro  8.2  /  Alb  4.1  /  TBili  0.2  /  DBili  x   /  AST  12  /  ALT  5<L>  /  AlkPhos  70  01-12    CAPILLARY BLOOD GLUCOSE      POCT Blood Glucose.: 322 mg/dL (13 Jan 2022 13:16)  POCT Blood Glucose.: 175 mg/dL (13 Jan 2022 06:39)  POCT Blood Glucose.: 193 mg/dL (12 Jan 2022 22:06)  POCT Blood Glucose.: 284 mg/dL (12 Jan 2022 16:48)      Medications:  MEDICATIONS  (STANDING):  amLODIPine   Tablet 10 milliGRAM(s) Oral every 24 hours  atorvastatin 10 milliGRAM(s) Oral at bedtime  dextrose 40% Gel 15 Gram(s) Oral once  dextrose 5%. 1000 milliLiter(s) (50 mL/Hr) IV Continuous <Continuous>  dextrose 5%. 1000 milliLiter(s) (100 mL/Hr) IV Continuous <Continuous>  dextrose 50% Injectable 25 Gram(s) IV Push once  dextrose 50% Injectable 12.5 Gram(s) IV Push once  dextrose 50% Injectable 25 Gram(s) IV Push once  glucagon  Injectable 1 milliGRAM(s) IntraMuscular once  heparin   Injectable 5000 Unit(s) SubCutaneous every 8 hours  hydrALAZINE 50 milliGRAM(s) Oral every 8 hours  insulin glargine Injectable (LANTUS) 3 Unit(s) SubCutaneous at bedtime  insulin lispro (ADMELOG) corrective regimen sliding scale   SubCutaneous Before meals and at bedtime  levothyroxine 50 MICROGram(s) Oral daily  nicotine -  14 mG/24Hr(s) Patch 1 patch Transdermal daily  QUEtiapine 25 milliGRAM(s) Oral at bedtime  tamsulosin 0.4 milliGRAM(s) Oral at bedtime  traZODone 100 milliGRAM(s) Oral at bedtime    MEDICATIONS  (PRN):  hydrALAZINE Injectable 10 milliGRAM(s) IV Push every 4 hours PRN systolic BP > 180    Admission anthropometrics:   Wt: 164lb 66" BMI: 26.6 IBW: 142lb %IBW: 115%    1/6/2022: 148.1LB  1/7/2022: 145.5LB    Weight Change: Admission weight was 164lbs however 1/6 weight is more reflective with CBW.      Nutrition Focused Physical Exam: Completed [   ]  Not Pertinent [ x  ]    Estimated energy needs:   ABW (65kg) used to calculate estimated needs since %IBW is between %. Estimated needs based on [facility] standard of care and needs adjusted based on non-HD.     1625-2275kcal (25-35kcal/kg)   52g -65g protein (0.8-1.0g/kg  Fluids determined per team     Subjective: 60 y/o M HTN, DM complicated by neuropathy, EtOH liver cirrhosis, hx IVDU, toxic megacolon c/b pneumatosis s/p colectomy/ileostomy who presents with generalized weakness x 3 days found to have EMG, hyponatremia, hyperkalemia with altered mental status, now s/p 2 HD sessions. Initially on 7La, now stable for stepdown to F for dispo planning and further collateral regarding baseline mental status. As per MD, pt self removed HD cath on 1/7. As per, nephrology 1/11/2021, MEG is resolving and no need for dialysis;  BP uncontrolled and now starting HCTZ 25mg. Receiving IV iron 200mg x 5 days. Noted with Cr levels improving.     Pt seen in room resting in bed, currently A&O x 1, with his aid and RN in room. Unable to obtain subjective information from patient but able to receive information from pts aid. Reported that he consumed 100% of his 1/12/2022 breakfast, 50% of his lunch and consumed today's breakfast 100%. As per nephrologist, pt MEG is improving including Cr levels. Labs presents with Na, phos, K, are WNL. Will rec to d/c renal diet and provide a protein restricted diet and TLC for low sodium due to uncontrolled BP. Provided diet education to aid regarding a protein restricted diet due to MEG and d'cd HD.     Previous Nutrition Diagnosis: Increased nutrient needs RT hypermetabolic state AEB ARF, need for emergent HD     Active [   ]  Resolved [  x ]    If resolved, new PES: Excessive protein intake r/t current order of Renal diet-no protein restriction AEB intake less than recommended amounts     Goal: Consume % of meals with appropriate protein recommendations.     Recommendations:  1. d/c renal diet and provide 60g protein diet for protein restriction; and TLC diet to control BP; 2. Monitor PO intake 3. Monitor ileostomy 4: recommend a MVI 5. Monitor Lytes     Education: Provided diet education to aid regarding a protein restricted diet due to d'cd HD.     Risk Level: High [  x ] Moderate [   ] Low [   ]

## 2022-01-13 NOTE — PROGRESS NOTE ADULT - PROBLEM SELECTOR PLAN 9
RESOLVED     pt with episodes of bradycardia. Cardiology following and likely in setting of electrolyte derangements 2/2 to renal failure. s/p atropine 0.5 mg x2.  s/p dopamine gtt while receiving HD, now off pressors     - TTE unremarkable  - cards following, appreciate recs  - No episodes of bradycardia since 1/6

## 2022-01-13 NOTE — PROGRESS NOTE ADULT - PROBLEM SELECTOR PLAN 3
Hx DM2. 07/2021 A1c 8.1%. Home meds Latesha Bustillos    - A1c 6.7  -c/w  Lantus 3u  - mISS  -continue to monitor blood glucose

## 2022-01-14 ENCOUNTER — TRANSCRIPTION ENCOUNTER (OUTPATIENT)
Age: 62
End: 2022-01-14

## 2022-01-14 LAB
ANION GAP SERPL CALC-SCNC: 13 MMOL/L — SIGNIFICANT CHANGE UP (ref 5–17)
BUN SERPL-MCNC: 40 MG/DL — HIGH (ref 7–23)
CALCIUM SERPL-MCNC: 8.9 MG/DL — SIGNIFICANT CHANGE UP (ref 8.4–10.5)
CHLORIDE SERPL-SCNC: 100 MMOL/L — SIGNIFICANT CHANGE UP (ref 96–108)
CO2 SERPL-SCNC: 20 MMOL/L — LOW (ref 22–31)
CREAT SERPL-MCNC: 1.8 MG/DL — HIGH (ref 0.5–1.3)
GLUCOSE BLDC GLUCOMTR-MCNC: 145 MG/DL — HIGH (ref 70–99)
GLUCOSE BLDC GLUCOMTR-MCNC: 261 MG/DL — HIGH (ref 70–99)
GLUCOSE BLDC GLUCOMTR-MCNC: 277 MG/DL — HIGH (ref 70–99)
GLUCOSE BLDC GLUCOMTR-MCNC: 290 MG/DL — HIGH (ref 70–99)
GLUCOSE SERPL-MCNC: 289 MG/DL — HIGH (ref 70–99)
HCT VFR BLD CALC: 25.1 % — LOW (ref 39–50)
HGB BLD-MCNC: 8.1 G/DL — LOW (ref 13–17)
MAGNESIUM SERPL-MCNC: 1.5 MG/DL — LOW (ref 1.6–2.6)
MCHC RBC-ENTMCNC: 28.9 PG — SIGNIFICANT CHANGE UP (ref 27–34)
MCHC RBC-ENTMCNC: 32.3 GM/DL — SIGNIFICANT CHANGE UP (ref 32–36)
MCV RBC AUTO: 89.6 FL — SIGNIFICANT CHANGE UP (ref 80–100)
NRBC # BLD: 0 /100 WBCS — SIGNIFICANT CHANGE UP (ref 0–0)
PHOSPHATE SERPL-MCNC: 3.1 MG/DL — SIGNIFICANT CHANGE UP (ref 2.5–4.5)
PLATELET # BLD AUTO: 147 K/UL — LOW (ref 150–400)
POTASSIUM SERPL-MCNC: 4 MMOL/L — SIGNIFICANT CHANGE UP (ref 3.5–5.3)
POTASSIUM SERPL-SCNC: 4 MMOL/L — SIGNIFICANT CHANGE UP (ref 3.5–5.3)
RBC # BLD: 2.8 M/UL — LOW (ref 4.2–5.8)
RBC # FLD: 13.3 % — SIGNIFICANT CHANGE UP (ref 10.3–14.5)
SODIUM SERPL-SCNC: 133 MMOL/L — LOW (ref 135–145)
WBC # BLD: 5.9 K/UL — SIGNIFICANT CHANGE UP (ref 3.8–10.5)
WBC # FLD AUTO: 5.9 K/UL — SIGNIFICANT CHANGE UP (ref 3.8–10.5)

## 2022-01-14 PROCEDURE — 99233 SBSQ HOSP IP/OBS HIGH 50: CPT | Mod: GC

## 2022-01-14 PROCEDURE — 99232 SBSQ HOSP IP/OBS MODERATE 35: CPT

## 2022-01-14 RX ORDER — IRON SUCROSE 20 MG/ML
200 INJECTION, SOLUTION INTRAVENOUS EVERY 24 HOURS
Refills: 0 | Status: COMPLETED | OUTPATIENT
Start: 2022-01-14 | End: 2022-01-18

## 2022-01-14 RX ORDER — LANOLIN ALCOHOL/MO/W.PET/CERES
5 CREAM (GRAM) TOPICAL AT BEDTIME
Refills: 0 | Status: DISCONTINUED | OUTPATIENT
Start: 2022-01-14 | End: 2022-01-19

## 2022-01-14 RX ORDER — GABAPENTIN 400 MG/1
300 CAPSULE ORAL DAILY
Refills: 0 | Status: DISCONTINUED | OUTPATIENT
Start: 2022-01-14 | End: 2022-01-16

## 2022-01-14 RX ORDER — ACETAMINOPHEN 500 MG
650 TABLET ORAL ONCE
Refills: 0 | Status: COMPLETED | OUTPATIENT
Start: 2022-01-14 | End: 2022-01-14

## 2022-01-14 RX ADMIN — HEPARIN SODIUM 5000 UNIT(S): 5000 INJECTION INTRAVENOUS; SUBCUTANEOUS at 05:31

## 2022-01-14 RX ADMIN — Medication 50 MILLIGRAM(S): at 22:44

## 2022-01-14 RX ADMIN — Medication 3: at 16:51

## 2022-01-14 RX ADMIN — HEPARIN SODIUM 5000 UNIT(S): 5000 INJECTION INTRAVENOUS; SUBCUTANEOUS at 22:40

## 2022-01-14 RX ADMIN — Medication 1 PATCH: at 05:37

## 2022-01-14 RX ADMIN — QUETIAPINE FUMARATE 25 MILLIGRAM(S): 200 TABLET, FILM COATED ORAL at 22:45

## 2022-01-14 RX ADMIN — AMLODIPINE BESYLATE 10 MILLIGRAM(S): 2.5 TABLET ORAL at 13:19

## 2022-01-14 RX ADMIN — Medication 1 PATCH: at 13:26

## 2022-01-14 RX ADMIN — Medication 100 MILLIGRAM(S): at 22:44

## 2022-01-14 RX ADMIN — Medication 50 MILLIGRAM(S): at 13:19

## 2022-01-14 RX ADMIN — Medication 5 MILLIGRAM(S): at 00:22

## 2022-01-14 RX ADMIN — Medication 3: at 12:41

## 2022-01-14 RX ADMIN — INSULIN GLARGINE 3 UNIT(S): 100 INJECTION, SOLUTION SUBCUTANEOUS at 22:45

## 2022-01-14 RX ADMIN — IRON SUCROSE 110 MILLIGRAM(S): 20 INJECTION, SOLUTION INTRAVENOUS at 16:13

## 2022-01-14 RX ADMIN — TAMSULOSIN HYDROCHLORIDE 0.4 MILLIGRAM(S): 0.4 CAPSULE ORAL at 22:45

## 2022-01-14 RX ADMIN — ATORVASTATIN CALCIUM 10 MILLIGRAM(S): 80 TABLET, FILM COATED ORAL at 22:44

## 2022-01-14 RX ADMIN — GABAPENTIN 300 MILLIGRAM(S): 400 CAPSULE ORAL at 11:20

## 2022-01-14 RX ADMIN — Medication 1 PATCH: at 18:06

## 2022-01-14 RX ADMIN — Medication 5 MILLIGRAM(S): at 22:45

## 2022-01-14 RX ADMIN — HEPARIN SODIUM 5000 UNIT(S): 5000 INJECTION INTRAVENOUS; SUBCUTANEOUS at 13:19

## 2022-01-14 RX ADMIN — Medication 1 PATCH: at 13:20

## 2022-01-14 RX ADMIN — Medication 3: at 22:46

## 2022-01-14 RX ADMIN — Medication 650 MILLIGRAM(S): at 14:20

## 2022-01-14 RX ADMIN — Medication 650 MILLIGRAM(S): at 15:20

## 2022-01-14 RX ADMIN — Medication 50 MICROGRAM(S): at 05:31

## 2022-01-14 RX ADMIN — Medication 50 MILLIGRAM(S): at 05:31

## 2022-01-14 NOTE — PROGRESS NOTE ADULT - PROBLEM SELECTOR PLAN 1
Non oliguric MEG 2/2 ATN vs toxic ingestion  - Resolving      Plan:   - Lab holiday  - monitor urine output   - hold home cymbalta and baclofen   - avoid nephrotoxic agents  - c/w flomax 0.4 Qd    #RLE pain  - Started gabapentin 300mg Qd

## 2022-01-14 NOTE — PROGRESS NOTE ADULT - ATTENDING COMMENTS
60yo man with MEG- probable ATN-required 2 HD treatments for hyperkalemia and severe metabolic acidosis (1/6 and 1/7)  creat down to 1.8  Na 133  no indication for repeat HD  limit free water    h/o cirrhosis, HTN, DM2 with neuropathy, hx of toxic megacolon c/b pneumatosis s/p colectomy and ileostomy   [place] for urinary retention- f/u  outpt  BP within target range

## 2022-01-14 NOTE — PROGRESS NOTE ADULT - ASSESSMENT
60 y/o M HTN, DM complicated by neuropathy, EtOH liver cirrhosis, hx IVDU, toxic megacolon c/b pneumatosis s/p colectomy/ileostomy who presents with generalized weakness x 3 days found to have MEG, hyponatremia, hyperkalemia with altered mental status, now s/p 2 HD sessions. Initially on 7La now on RMF  for dispo planning

## 2022-01-14 NOTE — DISCHARGE NOTE PROVIDER - HOSPITAL COURSE
#Discharge: do not delete    62 y/o M HTN, DM complicated by neuropathy, EtOH liver cirrhosis, hx IVDU, toxic megacolon c/b pneumatosis s/p colectomy/ileostomy who presents with generalized weakness x 3 days found to have MEG 2/2 ATN vs toxic ingestion s/p HD sessions and resolving  MEG.    Problem List/Main Diagnoses (system-based):   #ATN  - unclear etiology, possibly toxic ingestion  -s/p HD sessions   - Now making urine, no further HD per renal    #metabolic encephalopathy c/b delerium, UTI  - completed 5 day course of CTX for UTI  - Seroquel 25mg nightly for agitation    #HTN  c/w with hydralazine 50mg every 8 hours (prescription at time of DC)  c/w amlodipine 10mg every day    #Hx of Ostomy s/p toxic megacolon  -Good ostomy output    #DM  - on lantus 3 units and sliding scale while admitted    #Neuropathic pain  On gabapentin 300mg daily    Inpatient treatment course:   New medications:   Labs to be followed outpatient:   Exam to be followed outpatient:    #Discharge: do not delete    62 y/o M HTN, DM complicated by neuropathy, EtOH liver cirrhosis, hx IVDU, toxic megacolon c/b pneumatosis s/p colectomy/ileostomy who presents with generalized weakness x 3 days found to have MEG 2/2 ATN vs toxic ingestion vs UTI with hyponatremia, hyperkalemia and hyperphosphatemia, required monitoring in the MICU for AMS and need for urgent hemodialysis. Intermittently required pressors due to hypotension while on dialysis. Mental status improved s/p 2 HD session with no further requirement per renal. Completed course of IV CTX for UTI, now stable for discharge to [facility]    Problem List/Main Diagnoses (system-based):   #ATN  - unclear etiology, possibly toxic ingestion  -s/p HD sessions. Self removed Hd catheter  - Now making urine, no further HD per renal    #metabolic encephalopathy c/b delerium, UTI  - completed 5 day course of CTX for UTI  - Seroquel 25mg nightly for agitation    #HTN  c/w with hydralazine 50mg every 8 hours (prescription at time of DC)  c/w amlodipine 10mg every day    #Hx of Ostomy s/p toxic megacolon  -Good ostomy output    #DM  - on lantus 3 units and sliding scale  - lispro 2U TID    #Neuropathic pain  On gabapentin 300mg daily    Inpatient treatment course:   New medications:   Labs to be followed outpatient:   Exam to be followed outpatient:    #Discharge: do not delete    60 y/o M HTN, DM complicated by neuropathy, EtOH liver cirrhosis, hx IVDU, toxic megacolon c/b pneumatosis s/p colectomy/ileostomy who presents with generalized weakness x 3 days found to have MEG 2/2 ATN vs toxic ingestion vs UTI with hyponatremia, hyperkalemia and hyperphosphatemia, required monitoring in the MICU for AMS and need for urgent hemodialysis. Intermittently required pressors due to hypotension while on dialysis. Mental status improved s/p 2 HD session with no further requirement per renal. Completed course of IV CTX for UTI, now stable for discharge to [facility]    Problem List/Main Diagnoses (system-based):   #ATN  - unclear etiology, possibly toxic ingestion  -s/p HD sessions. Self removed Hd catheter  - Now making urine, no further HD per renal    #metabolic encephalopathy c/b delerium, UTI  - completed 5 day course of CTX for UTI  - Seroquel 25mg nightly for agitation    #HTN  c/w with hydralazine 50mg every 8 hours (prescription at time of DC)  c/w amlodipine 10mg every day    #Hx of Ostomy s/p toxic megacolon  -Good ostomy output    #DM  - on lantus 3 units and sliding scale  - lispro 2U TID    #Neuropathic pain  On gabapentin 300mg daily    Inpatient treatment course: ceftriaxone, amlodipine, duloxetine 30mg, ferrous sulfate 325, hydralazine 50, neurontin 300mg, tamsulosin 0.4, trazodone 100mg Qd  New medications: hydralazine, amlodipine, duloxetine 30mg, ferrous sulfate 325, hydralazine 50, neurontin 300mg, tamsulosin 0.4, trazodone 100mg Qd  Labs to be followed outpatient: n/a  Exam to be followed outpatient: n/a

## 2022-01-14 NOTE — PROGRESS NOTE ADULT - SUBJECTIVE AND OBJECTIVE BOX
Patient was seen and examined by me at bedside. I agree with resident's note, subjective, objective physical exam, assessment and plan with following modifications/additions.    Greater than 35 minutes spent on total encounter; more than 50% of the visit was spent counseling and/or coordinating care by the attending physician.    62 y/o M HTN, DM complicated by neuropathy, EtOH liver cirrhosis, hx IVDU, toxic megacolon c/b pneumatosis s/p colectomy/ileostomy who presented with acute renal failure 2/2 ATN briefly requiring HD now stable without, course c/b metabolic encephalopathy 2/2 UTI and uremia   #ATN, resolving and urinating well- appreciate renal recs, no further in house workup   #Metabolic encephalopathy c/b delirium, UTI completed 5 day abx course- c/w nightly seroquel while here, otherwise c/w home psych meds  #HTN- c/w hydral (started here), c/w amlodipine   #Hx of ostomy s/p toxic megacolon- good ostomy outpt, no complications  #DM- on ISS/low dose lantus here and oral medication at home    #DVT px- [facility]  #Dispo- pending PT daily eval as uninsured cannot go to [facility], once cleared by PT then will need shelter packet done. if leaving needs new medication scripts on us as uninsured and f/u with [facility]      Marco Zavala MD [phone]  Patient was seen and examined by me at bedside. I agree with resident's note, subjective, objective physical exam, assessment and plan with following modifications/additions.    Greater than 35 minutes spent on total encounter; more than 50% of the visit was spent counseling and/or coordinating care by the attending physician.    62 y/o M HTN, DM complicated by neuropathy, EtOH liver cirrhosis, hx IVDU, toxic megacolon c/b pneumatosis s/p colectomy/ileostomy who presented with acute renal failure 2/2 ATN briefly requiring HD now stable without, course c/b metabolic encephalopathy 2/2 UTI and uremia   #ATN, resolving and urinating well- appreciate renal recs, no further in house workup   #Metabolic encephalopathy c/b delirium, UTI completed 5 day abx course- c/w nightly seroquel while here, otherwise c/w home psych meds  #HTN- c/w hydral (started here), c/w amlodipine   #Hx of ostomy s/p toxic megacolon- good ostomy outpt, no complications  #DM- on ISS/low dose lantus here and oral medication at home, c/w gabapentin for neuropathic pain    #DVT px- SQ[facility]  #Dispo- pending PT daily eval as uninsured cannot go to [facility], once cleared by PT then will need shelter packet done. if leaving needs new medication scripts on us as uninsured and f/u with [facility]      Marco Zavala MD [phone]

## 2022-01-14 NOTE — PROGRESS NOTE ADULT - PROBLEM SELECTOR PLAN 3
Hx DM2. 07/2021 A1c 8.1%. Home meds Latesha Bustillos    - A1c 6.7  -c/w  Lantus 3u  - mISS  -continue to monitor blood glucose this admission

## 2022-01-14 NOTE — DISCHARGE NOTE PROVIDER - NSDCMRMEDTOKEN_GEN_ALL_CORE_FT
amLODIPine 10 mg oral tablet: 1 tab(s) orally once a day  baclofen 10 mg oral tablet: 1 tab(s) orally every 6 hours  DULoxetine 30 mg oral delayed release capsule: 1  orally once a day (at bedtime)  Ferrex 150 Forte oral capsule: 1  orally once a day  ferrous sulfate 325 mg (65 mg elemental iron) oral tablet: 1  orally once a day  gabapentin 300 mg oral capsule: 4 cap(s) orally every 8 hours  glucometer (per patient&#x27;s insurance): 1 application subcutaneous 4 times a day   hydroCHLOROthiazide 25 mg oral tablet: 1 tab(s) orally once a day MDD:1  Janumet 50 mg-500 mg oral tablet: 1 tab(s) orally 2 times a day  levothyroxine 50 mcg (0.05 mg) oral capsule: 1 cap(s) orally once a day  pantoprazole 40 mg oral delayed release tablet: 1 tab(s) orally every 12 hours  pravastatin 40 mg oral tablet: 1 tab(s) orally once a day  sodium chloride 1 g oral tablet: 1 tab(s) orally every 8 hours MDD:3  traZODone 100 mg oral tablet: 100 milligram(s) orally once a day (at bedtime)   DULoxetine 30 mg oral delayed release capsule: 1  orally once a day (at bedtime)  ferrous sulfate 325 mg (65 mg elemental iron) oral tablet: 1  orally once a day  hydrALAZINE 50 mg oral tablet: 1 tab(s) orally every 8 hours  Janumet 50 mg-500 mg oral tablet: 1 tab(s) orally 2 times a day  Neurontin 300 mg oral capsule: 1 cap(s) orally 3 times a day  Norvasc 10 mg oral tablet: 1 tab(s) orally every 24 hours  pantoprazole 40 mg oral delayed release tablet: 1 tab(s) orally every 12 hours  pravastatin 40 mg oral tablet: 1 tab(s) orally once a day  Synthroid 50 mcg (0.05 mg) oral tablet: 1 tab(s) orally once a day  tamsulosin 0.4 mg oral capsule: 1 cap(s) orally once a day (at bedtime)  traZODone 100 mg oral tablet: 1 tab(s) orally once a day (at bedtime)

## 2022-01-14 NOTE — DISCHARGE NOTE PROVIDER - DETAILS OF MALNUTRITION DIAGNOSIS/DIAGNOSES
This patient has been assessed with a concern for Malnutrition and was treated during this hospitalization for the following Nutrition diagnosis/diagnoses:     -  01/18/2022: Moderate protein-calorie malnutrition

## 2022-01-14 NOTE — PROGRESS NOTE ADULT - SUBJECTIVE AND OBJECTIVE BOX
OVERNIGHT EVENTS: NAEO    SUBJECTIVE / INTERVAL HPI: Patient seen and examined at bedside. Complaining of tingling R leg pain    PHYSICAL EXAM:  General: NAD, lying comfortably in bed   HEENT: Anicteric sclera, EOMI, MMM  Cardiovascular: +S1/S2; RRR; No JVD   Respiratory: CTAB, no accessory muscle use  Gastrointestinal: soft, non-distended, no tenderness to palpation, colostomy bag clean dry w/ semi solid stool  Extremities: WWP; no edema, no erythema, tenderness of R foot  Vascular: 2+ radial, DP/PT pulses B/L  Neurological: AAOx3; mild tremor present while resting in chair, 4/5 strength in bilateral upper and lower extremities   Skin: No visible rash, skin discoloration    VITAL SIGNS:  Vital Signs Last 24 Hrs  T(C): 36.7 (14 Jan 2022 08:22), Max: 37.1 (13 Jan 2022 16:12)  T(F): 98.1 (14 Jan 2022 08:22), Max: 98.7 (13 Jan 2022 16:12)  HR: 78 (14 Jan 2022 13:16) (69 - 88)  BP: 142/70 (14 Jan 2022 13:16) (119/65 - 142/70)  BP(mean): 88 (14 Jan 2022 05:00) (86 - 88)  RR: 18 (14 Jan 2022 13:16) (16 - 18)  SpO2: 97% (14 Jan 2022 13:16) (95% - 97%)      MEDICATIONS:  MEDICATIONS  (STANDING):  amLODIPine   Tablet 10 milliGRAM(s) Oral every 24 hours  atorvastatin 10 milliGRAM(s) Oral at bedtime  dextrose 40% Gel 15 Gram(s) Oral once  dextrose 5%. 1000 milliLiter(s) (50 mL/Hr) IV Continuous <Continuous>  dextrose 5%. 1000 milliLiter(s) (100 mL/Hr) IV Continuous <Continuous>  dextrose 50% Injectable 25 Gram(s) IV Push once  dextrose 50% Injectable 12.5 Gram(s) IV Push once  dextrose 50% Injectable 25 Gram(s) IV Push once  gabapentin 300 milliGRAM(s) Oral daily  glucagon  Injectable 1 milliGRAM(s) IntraMuscular once  heparin   Injectable 5000 Unit(s) SubCutaneous every 8 hours  hydrALAZINE 50 milliGRAM(s) Oral every 8 hours  insulin glargine Injectable (LANTUS) 3 Unit(s) SubCutaneous at bedtime  insulin lispro (ADMELOG) corrective regimen sliding scale   SubCutaneous Before meals and at bedtime  iron sucrose IVPB 200 milliGRAM(s) IV Intermittent every 24 hours  levothyroxine 50 MICROGram(s) Oral daily  melatonin 5 milliGRAM(s) Oral at bedtime  nicotine -  14 mG/24Hr(s) Patch 1 patch Transdermal daily  QUEtiapine 25 milliGRAM(s) Oral at bedtime  tamsulosin 0.4 milliGRAM(s) Oral at bedtime  traZODone 100 milliGRAM(s) Oral at bedtime    MEDICATIONS  (PRN):      ALLERGIES:  Allergies    No Known Allergies    Intolerances        LABS:                        8.1    5.90  )-----------( 147      ( 14 Jan 2022 12:12 )             25.1     01-14    133<L>  |  100  |  40<H>  ----------------------------<  289<H>  4.0   |  20<L>  |  1.80<H>    Ca    8.9      14 Jan 2022 12:12  Phos  3.1     01-14  Mg     1.5     01-14          CAPILLARY BLOOD GLUCOSE      POCT Blood Glucose.: 277 mg/dL (14 Jan 2022 12:36)      RADIOLOGY & ADDITIONAL TESTS: Reviewed.

## 2022-01-14 NOTE — PROGRESS NOTE ADULT - ASSESSMENT
60yo M hx of ?EtOH, cirrhosis, HTN, DM2 with neuropathy, hx of toxic megacolon c/b pneumatosis s/p colectomy and ileostomy presents with acute renal failure requiring emergent HD 1/6 and 1/7    Now non oliguric MEG 2/2 to ATN vs toxic ingestion, resolving  Baseline creat~ 1  patient pulled soliz, bladder scan and straight cath q6h and PRN   No need for dialysis presently   Nl sized kidneys without echogenicity c/w acute process    BP is controlled, continue with home meds norvasc 10mg daily, hydralazine 50mg q8h, and consider restarting HCTZ 25mg daily (home med)   On start IV iron 200mg x5days    Renal diet, avoid nephrotoxic meds, renally dose meds, strict I&Os     Thank you for the opportunity to participate in the care of your patient. The nephrology service remains available to assist with any questions or concerns. Please feel free to reach us by paging the on-call nephrology fellow for urgent issues or as below.     Vinny Chaney M.D.   PGY-5, Nephrology Fellow   C: [phone]   P: [phone]

## 2022-01-14 NOTE — PROGRESS NOTE ADULT - SUBJECTIVE AND OBJECTIVE BOX
Patient is a 61y Male seen and evaluated at bedside.   BP is acceptable  Cr is downtrending though no labs today   denies CP SOB or fever     Meds:    amLODIPine   Tablet 10 every 24 hours  atorvastatin 10 at bedtime  dextrose 40% Gel 15 once  dextrose 5%. 1000 <Continuous>  dextrose 5%. 1000 <Continuous>  dextrose 50% Injectable 25 once  dextrose 50% Injectable 12.5 once  dextrose 50% Injectable 25 once  gabapentin 300 daily  glucagon  Injectable 1 once  heparin   Injectable 5000 every 8 hours  hydrALAZINE 50 every 8 hours  insulin glargine Injectable (LANTUS) 3 at bedtime  insulin lispro (ADMELOG) corrective regimen sliding scale  Before meals and at bedtime  levothyroxine 50 daily  melatonin 5 at bedtime  nicotine -  14 mG/24Hr(s) Patch 1 daily  QUEtiapine 25 at bedtime  tamsulosin 0.4 at bedtime  traZODone 100 at bedtime      T(C): , Max: 37.1 (01-13-22 @ 16:12)  T(F): , Max: 98.7 (01-13-22 @ 16:12)  HR: 81 (01-14-22 @ 08:22)  BP: 132/61 (01-14-22 @ 08:22)  BP(mean): 88 (01-14-22 @ 05:00)  RR: 18 (01-14-22 @ 08:22)  SpO2: 95% (01-14-22 @ 08:22)  Wt(kg): --    01-13 @ 07:01 - 01-14 @ 07:00  --------------------------------------------------------  IN: 0 mL / OUT: 1300 mL / NET: -1300 mL    01-14 @ 07:01 - 01-14 @ 11:47  --------------------------------------------------------  IN: 240 mL / OUT: 100 mL / NET: 140 mL        Review of Systems:  RESPIRATORY: No shortness of breath  CARDIOVASCULAR: No chest pain   MUSCULOSKELETAL: No leg edema; +LE pain     PHYSICAL EXAM:  GENERAL: Awake, alert, on RA  CHEST/LUNG: Bilateral clear breath sounds, no rales  HEART: Regular rate and rhythm, no murmur  ABDOMEN: Soft, nontender, non distended  EXTREMITIES: No pedal edema, non-tender LEs       LABS:                        8.9    7.36  )-----------( 191      ( 13 Jan 2022 06:26 )             27.8     01-13    136  |  102  |  42<H>  ----------------------------<  211<H>  3.8   |  21<L>  |  2.16<H>    Ca    9.1      13 Jan 2022 06:26  Phos  4.1     01-13  Mg     1.8     01-13                  RADIOLOGY & ADDITIONAL STUDIES:

## 2022-01-15 LAB
GLUCOSE BLDC GLUCOMTR-MCNC: 134 MG/DL — HIGH (ref 70–99)
GLUCOSE BLDC GLUCOMTR-MCNC: 152 MG/DL — HIGH (ref 70–99)
GLUCOSE BLDC GLUCOMTR-MCNC: 200 MG/DL — HIGH (ref 70–99)
GLUCOSE BLDC GLUCOMTR-MCNC: 257 MG/DL — HIGH (ref 70–99)
GLUCOSE BLDC GLUCOMTR-MCNC: 276 MG/DL — HIGH (ref 70–99)

## 2022-01-15 RX ORDER — ACETAMINOPHEN 500 MG
650 TABLET ORAL EVERY 6 HOURS
Refills: 0 | Status: DISCONTINUED | OUTPATIENT
Start: 2022-01-15 | End: 2022-01-19

## 2022-01-15 RX ORDER — INSULIN LISPRO 100/ML
2 VIAL (ML) SUBCUTANEOUS
Refills: 0 | Status: DISCONTINUED | OUTPATIENT
Start: 2022-01-15 | End: 2022-01-19

## 2022-01-15 RX ORDER — ONDANSETRON 8 MG/1
4 TABLET, FILM COATED ORAL EVERY 8 HOURS
Refills: 0 | Status: DISCONTINUED | OUTPATIENT
Start: 2022-01-15 | End: 2022-01-19

## 2022-01-15 RX ADMIN — Medication 2 UNIT(S): at 13:07

## 2022-01-15 RX ADMIN — HEPARIN SODIUM 5000 UNIT(S): 5000 INJECTION INTRAVENOUS; SUBCUTANEOUS at 05:49

## 2022-01-15 RX ADMIN — Medication 2 UNIT(S): at 17:18

## 2022-01-15 RX ADMIN — HEPARIN SODIUM 5000 UNIT(S): 5000 INJECTION INTRAVENOUS; SUBCUTANEOUS at 14:49

## 2022-01-15 RX ADMIN — Medication 50 MILLIGRAM(S): at 05:48

## 2022-01-15 RX ADMIN — Medication 50 MICROGRAM(S): at 05:48

## 2022-01-15 RX ADMIN — Medication 650 MILLIGRAM(S): at 20:29

## 2022-01-15 RX ADMIN — ATORVASTATIN CALCIUM 10 MILLIGRAM(S): 80 TABLET, FILM COATED ORAL at 21:24

## 2022-01-15 RX ADMIN — Medication 1 PATCH: at 06:45

## 2022-01-15 RX ADMIN — INSULIN GLARGINE 3 UNIT(S): 100 INJECTION, SOLUTION SUBCUTANEOUS at 21:41

## 2022-01-15 RX ADMIN — Medication 3: at 17:18

## 2022-01-15 RX ADMIN — GABAPENTIN 300 MILLIGRAM(S): 400 CAPSULE ORAL at 13:00

## 2022-01-15 RX ADMIN — TAMSULOSIN HYDROCHLORIDE 0.4 MILLIGRAM(S): 0.4 CAPSULE ORAL at 21:24

## 2022-01-15 RX ADMIN — QUETIAPINE FUMARATE 25 MILLIGRAM(S): 200 TABLET, FILM COATED ORAL at 21:24

## 2022-01-15 RX ADMIN — HEPARIN SODIUM 5000 UNIT(S): 5000 INJECTION INTRAVENOUS; SUBCUTANEOUS at 21:24

## 2022-01-15 RX ADMIN — Medication 3: at 13:07

## 2022-01-15 RX ADMIN — Medication 100 MILLIGRAM(S): at 21:24

## 2022-01-15 RX ADMIN — Medication 50 MILLIGRAM(S): at 21:24

## 2022-01-15 RX ADMIN — Medication 1: at 21:40

## 2022-01-15 RX ADMIN — Medication 5 MILLIGRAM(S): at 21:24

## 2022-01-15 RX ADMIN — Medication 1 PATCH: at 13:14

## 2022-01-15 RX ADMIN — Medication 650 MILLIGRAM(S): at 19:29

## 2022-01-15 RX ADMIN — IRON SUCROSE 110 MILLIGRAM(S): 20 INJECTION, SOLUTION INTRAVENOUS at 15:44

## 2022-01-15 NOTE — PROGRESS NOTE ADULT - PROBLEM SELECTOR PLAN 1
Non oliguric MEG 2/2 ATN vs toxic ingestion, briefly requiring 2 HD sessions.  - Resolving    Plan:   - Continue lab holiday  - monitor urine output   - hold home cymbalta and baclofen   - avoid nephrotoxic agents  - c/w flomax 0.4 Qd    #RLE pain  - Started gabapentin 300mg Qd

## 2022-01-15 NOTE — PROGRESS NOTE ADULT - SUBJECTIVE AND OBJECTIVE BOX
CC: WEAKNESS        INTERVAL HISTORY: lying comfortably in bed in NAD      ROS: No chest pain, no sob, no abd pain. No n/v/d    PAST MEDICAL & SURGICAL HISTORY:  DM (diabetes mellitus)    HTN (hypertension)    HLD (hyperlipidemia)    ETOH abuse    Anemia    No significant past surgical history    History of colectomy    History of ileostomy        PHYSICAL EXAM:  T(C): 36.8 (01-15-22 @ 05:44), Max: 36.9 (01-14-22 @ 16:15)  HR: 73 (01-15-22 @ 05:44)  BP: 138/68 (01-15-22 @ 05:44) (121/75 - 142/70)  RR: 16 (01-15-22 @ 05:44)  SpO2: 97% (01-15-22 @ 05:44)  Wt(kg): --  I&O's Summary    14 Jan 2022 07:01  -  15 Ronnie 2022 07:00  --------------------------------------------------------  IN: 480 mL / OUT: 1000 mL / NET: -520 mL      Weight   General:  NAD.  HEENT: moist mucous membranes, no pallor/cyanosis.  Neck: no JVD visible.  Cardiac: S1, S2. RRR. No murmurs   Respratory: CTA b/l, no access muscle use.   Abdomen: soft. nontender. nondistended; -ostomy  Skin: no rashes.  Extremities: no LE edema b/l  Access:       DATA:                        8.1<L>  5.90  )-----------( 147<L>    ( 14 Jan 2022 12:12 )             25.1<L>    Ferritin, Serum: 367 ng/mL (01-07 @ 06:37)      133<L>  |  100    |  40<H>  ----------------------------<  289<H>  Ca:8.9   (14 Jan 2022 12:12)  4.0     |  20<L>  |  1.80<H>      eGFR if Non : 40 *L*  eGFR if : 46 *L*                Protein/Creatinine Ratio Calculation: 1.2 Ratio *H* (01-12 @ 02:07)          MEDICATIONS  (STANDING):  amLODIPine   Tablet 10 milliGRAM(s) Oral every 24 hours  atorvastatin 10 milliGRAM(s) Oral at bedtime  dextrose 40% Gel 15 Gram(s) Oral once  dextrose 5%. 1000 milliLiter(s) (50 mL/Hr) IV Continuous <Continuous>  dextrose 5%. 1000 milliLiter(s) (100 mL/Hr) IV Continuous <Continuous>  dextrose 50% Injectable 25 Gram(s) IV Push once  dextrose 50% Injectable 12.5 Gram(s) IV Push once  dextrose 50% Injectable 25 Gram(s) IV Push once  gabapentin 300 milliGRAM(s) Oral daily  glucagon  Injectable 1 milliGRAM(s) IntraMuscular once  heparin   Injectable 5000 Unit(s) SubCutaneous every 8 hours  hydrALAZINE 50 milliGRAM(s) Oral every 8 hours  insulin glargine Injectable (LANTUS) 3 Unit(s) SubCutaneous at bedtime  insulin lispro (ADMELOG) corrective regimen sliding scale   SubCutaneous Before meals and at bedtime  insulin lispro Injectable (ADMELOG) 2 Unit(s) SubCutaneous three times a day before meals  iron sucrose IVPB 200 milliGRAM(s) IV Intermittent every 24 hours  levothyroxine 50 MICROGram(s) Oral daily  melatonin 5 milliGRAM(s) Oral at bedtime  nicotine -  14 mG/24Hr(s) Patch 1 patch Transdermal daily  QUEtiapine 25 milliGRAM(s) Oral at bedtime  tamsulosin 0.4 milliGRAM(s) Oral at bedtime  traZODone 100 milliGRAM(s) Oral at bedtime    MEDICATIONS  (PRN):

## 2022-01-15 NOTE — PROGRESS NOTE ADULT - ASSESSMENT
60yo M hx of ?EtOH, cirrhosis, HTN, DM2 with neuropathy, hx of toxic megacolon c/b pneumatosis s/p colectomy and ileostomy presents with acute renal failure that required emergent HD 1/6 and 1/7  now independent of hemodialysis with resolving MEG

## 2022-01-15 NOTE — PROGRESS NOTE ADULT - PROBLEM SELECTOR PLAN 1
resolving, non-oliguric MEG  continue Hydralazine and Norvasc for hypertension  IV sucrose for anemia resolving, non-oliguric MEG  continue Hydralazine and Norvasc for hypertension  Fe sucrose for anemia

## 2022-01-15 NOTE — PROGRESS NOTE ADULT - ATTENDING COMMENTS
Patient with no events overnight. Encephalopathy has resolved as has renal function and no longer requiring HD. Patient working with PT everyday to get stronger. Currently recommended for [facility], but unable to go there due to homelessness and uninsured. Once strong enough will work on [facility]. Currently only doing periodic labs.

## 2022-01-15 NOTE — PROGRESS NOTE ADULT - SUBJECTIVE AND OBJECTIVE BOX
** INCOMPLETE **    OVERNIGHT EVENTS:     SUBJECTIVE:    VITAL SIGNS:  Vital Signs Last 24 Hrs  T(C): 36.9 (14 Jan 2022 21:02), Max: 36.9 (14 Jan 2022 16:15)  T(F): 98.5 (14 Jan 2022 21:02), Max: 98.5 (14 Jan 2022 21:02)  HR: 68 (14 Jan 2022 21:02) (68 - 81)  BP: 121/75 (14 Jan 2022 21:02) (121/75 - 142/70)  BP(mean): --  RR: 18 (14 Jan 2022 21:02) (17 - 18)  SpO2: 96% (14 Jan 2022 21:02) (95% - 98%)    PHYSICAL EXAM:  General: NAD; speaking in full sentences  HEENT: NC/AT; PERRL; EOMI; MMM  Neck: supple; no JVD  Cardiac: RRR; +S1/S2  Pulm: CTA B/L; no W/R/R  GI: soft, NT/ND, +BS  Extremities: WWP; no edema, clubbing or cyanosis  Vasc: 2+ radial, DP pulses B/L  Neuro: AAOx3; no focal deficits    MEDICATIONS:  MEDICATIONS  (STANDING):  amLODIPine   Tablet 10 milliGRAM(s) Oral every 24 hours  atorvastatin 10 milliGRAM(s) Oral at bedtime  dextrose 40% Gel 15 Gram(s) Oral once  dextrose 5%. 1000 milliLiter(s) (50 mL/Hr) IV Continuous <Continuous>  dextrose 5%. 1000 milliLiter(s) (100 mL/Hr) IV Continuous <Continuous>  dextrose 50% Injectable 25 Gram(s) IV Push once  dextrose 50% Injectable 12.5 Gram(s) IV Push once  dextrose 50% Injectable 25 Gram(s) IV Push once  gabapentin 300 milliGRAM(s) Oral daily  glucagon  Injectable 1 milliGRAM(s) IntraMuscular once  heparin   Injectable 5000 Unit(s) SubCutaneous every 8 hours  hydrALAZINE 50 milliGRAM(s) Oral every 8 hours  insulin glargine Injectable (LANTUS) 3 Unit(s) SubCutaneous at bedtime  insulin lispro (ADMELOG) corrective regimen sliding scale   SubCutaneous Before meals and at bedtime  insulin lispro Injectable (ADMELOG) 2 Unit(s) SubCutaneous three times a day before meals  iron sucrose IVPB 200 milliGRAM(s) IV Intermittent every 24 hours  levothyroxine 50 MICROGram(s) Oral daily  melatonin 5 milliGRAM(s) Oral at bedtime  nicotine -  14 mG/24Hr(s) Patch 1 patch Transdermal daily  QUEtiapine 25 milliGRAM(s) Oral at bedtime  tamsulosin 0.4 milliGRAM(s) Oral at bedtime  traZODone 100 milliGRAM(s) Oral at bedtime    MEDICATIONS  (PRN):      ALLERGIES:  Allergies    No Known Allergies    Intolerances        LABS:                        8.1    5.90  )-----------( 147      ( 14 Jan 2022 12:12 )             25.1     01-14    133<L>  |  100  |  40<H>  ----------------------------<  289<H>  4.0   |  20<L>  |  1.80<H>    Ca    8.9      14 Jan 2022 12:12  Phos  3.1     01-14  Mg     1.5     01-14          RADIOLOGY & ADDITIONAL TESTS: Reviewed. OVERNIGHT EVENTS:   No acute events overnight.     SUBJECTIVE:  Patient seen and examined at bedside, resting comfortably in bed, and does not appear to be in any acute distress. Patient initially agitated and refused physical examination. Patient states he is feeling better. Patient denies any recent fevers, chills, nausea, vomiting, headache, acute sob, chest pain, abdominal pain, genitourinary sx, extremity pain or swelling.     VITAL SIGNS:  Vital Signs Last 24 Hrs  T(C): 36.9 (14 Jan 2022 21:02), Max: 36.9 (14 Jan 2022 16:15)  T(F): 98.5 (14 Jan 2022 21:02), Max: 98.5 (14 Jan 2022 21:02)  HR: 68 (14 Jan 2022 21:02) (68 - 81)  BP: 121/75 (14 Jan 2022 21:02) (121/75 - 142/70)  BP(mean): --  RR: 18 (14 Jan 2022 21:02) (17 - 18)  SpO2: 96% (14 Jan 2022 21:02) (95% - 98%)    PHYSICAL EXAM:  General: NAD, lying comfortably in bed   HEENT: Anicteric sclera, EOMI, MMM  Cardiovascular: +S1/S2; RRR; No JVD   Respiratory: CTAB, no increased WOB or accessory muscle use  Gastrointestinal: soft, non-distended, no tenderness to palpation, colostomy bag intact, not leaking  Extremities: WWP; no edema, no erythema, tenderness of R foot  Vascular: 2+ radial, DP/PT pulses B/L  Neurological: AAOx3; mild tremor present while resting in chair, 4/5 strength in bilateral upper and lower extremities   Skin: No visible rash, skin discoloration    MEDICATIONS:  MEDICATIONS  (STANDING):  amLODIPine   Tablet 10 milliGRAM(s) Oral every 24 hours  atorvastatin 10 milliGRAM(s) Oral at bedtime  dextrose 40% Gel 15 Gram(s) Oral once  dextrose 5%. 1000 milliLiter(s) (50 mL/Hr) IV Continuous <Continuous>  dextrose 5%. 1000 milliLiter(s) (100 mL/Hr) IV Continuous <Continuous>  dextrose 50% Injectable 25 Gram(s) IV Push once  dextrose 50% Injectable 12.5 Gram(s) IV Push once  dextrose 50% Injectable 25 Gram(s) IV Push once  gabapentin 300 milliGRAM(s) Oral daily  glucagon  Injectable 1 milliGRAM(s) IntraMuscular once  heparin   Injectable 5000 Unit(s) SubCutaneous every 8 hours  hydrALAZINE 50 milliGRAM(s) Oral every 8 hours  insulin glargine Injectable (LANTUS) 3 Unit(s) SubCutaneous at bedtime  insulin lispro (ADMELOG) corrective regimen sliding scale   SubCutaneous Before meals and at bedtime  insulin lispro Injectable (ADMELOG) 2 Unit(s) SubCutaneous three times a day before meals  iron sucrose IVPB 200 milliGRAM(s) IV Intermittent every 24 hours  levothyroxine 50 MICROGram(s) Oral daily  melatonin 5 milliGRAM(s) Oral at bedtime  nicotine -  14 mG/24Hr(s) Patch 1 patch Transdermal daily  QUEtiapine 25 milliGRAM(s) Oral at bedtime  tamsulosin 0.4 milliGRAM(s) Oral at bedtime  traZODone 100 milliGRAM(s) Oral at bedtime    MEDICATIONS  (PRN):      ALLERGIES:  Allergies    No Known Allergies    Intolerances        LABS:                        8.1    5.90  )-----------( 147      ( 14 Jan 2022 12:12 )             25.1     01-14    133<L>  |  100  |  40<H>  ----------------------------<  289<H>  4.0   |  20<L>  |  1.80<H>    Ca    8.9      14 Jan 2022 12:12  Phos  3.1     01-14  Mg     1.5     01-14          RADIOLOGY & ADDITIONAL TESTS: Reviewed.

## 2022-01-15 NOTE — PROGRESS NOTE ADULT - ASSESSMENT
per Internal Medicine    62 y/o M HTN, DM complicated by neuropathy, EtOH liver cirrhosis, hx IVDU, toxic megacolon c/b pneumatosis s/p colectomy/ileostomy who presents with generalized weakness x 3 days found to have MEG, hyponatremia, hyperkalemia with altered mental status, now s/p 2 HD sessions. Initially on 7La now on RM[facility]  for dispo planning      Problem/Plan - 1:  ·  Problem: MEG (acute kidney injury).   ·  Plan: Non oliguric MEG 2/2 ATN vs toxic ingestion  - Resolving      Plan:   - Lab holiday  - monitor urine output   - hold home cymbalta and baclofen   - avoid nephrotoxic agents  - c/w flomax 0.4 Qd    #RLE pain  - Started gabapentin 300mg Qd.    Problem/Plan - 2:  ·  Problem: Hypertension.   ·  Plan: On hctz and amlodipine at home.     - Medications initially held due to concern for sepsis.   - Pt then resumed amlodipine and started hydralazine 25mg q8 after episodes of hypertension  - Hydralazine increased to 50mg q8     Plan:  - c/w hydralazine 50mg q8  - C/w amlodipine 10 qd  - Consider restarting home dose HCTZ 25 mg QD  - Hold lisinopril i/s/o MEG.    Problem/Plan - 3:  ·  Problem: DM (diabetes mellitus).   ·  Plan: Hx DM2. 07/2021 A1c 8.1%. Home meds Janumet, Levemir    - A1c 6.7  -c/w  Lantus 3u  - mISS  -continue to monitor blood glucose.    Problem/Plan - 4:  ·  Problem: Anemia.   ·  Plan: Iron studies consistent w/ iron deficiency anemia.     - Started IV iron 200mg X 5 days per renal.    Problem/Plan - 5:  ·  Problem: Hyperlipidemia.   ·  Plan: c/w home atorvastatin 10 mg QD.    Problem/Plan - 6:  ·  Problem: Colostomy in place.   ·  Plan: s/p colon resection with ostomy     - patient with colostomy bag s/p colectomy for pneumatosis in setting of toxic megacolon;   - monitor output.    Problem/Plan - 7:  ·  Problem: Urinary tract infection.   ·  Plan: UA grossly positive, patient c/o dysuria, UCx growing klebsiella sensitive to ceftriaxone  - s/p ceftriaxone 2g (1/7-1/12).    Problem/Plan - 8:  ·  Problem: Metabolic encephalopathy.   ·  Plan: RESOLVED     Presented w/ altered mental status and repetitive lip smacking. Likely 2/2 electrolyte derangements in the setting of acute renal failure. Pt now at baseline s/p HD.     - Ammonia, syphilis, B12, folate wnl.    Problem/Plan - 9:  ·  Problem: Bradycardia.   ·  Plan: RESOLVED     pt with episodes of bradycardia. Cardiology following and likely in setting of electrolyte derangements 2/2 to renal failure. s/p atropine 0.5 mg x2.  s/p dopamine gtt while receiving HD, now off pressors     - TTE unremarkable  - cards following, appreciate recs  - No episodes of bradycardia since 1/6.    Problem/Plan - 10:  ·  Problem: Nutrition, metabolism, and development symptoms.   ·  Plan; F: none, HD  E: Replete as needed   N: consistent carb/renal diet  Ppx: hep subq  Dispo: [facility].

## 2022-01-15 NOTE — PROGRESS NOTE ADULT - SUBJECTIVE AND OBJECTIVE BOX
Physical Medicine and Rehabilitation Progress Note:    Patient is a 61y old  Male who presents with a chief complaint of electrolyte abnormalities (15 Ronnie 2022 08:01)      HPI:  60yo M hx of ?etoh cirrhosis, HTN, DM2 with neuropathy, hx of toxic megacolon c/b pneumatosis s/p colectomy and ileostomy, admission 6 months ago for hyperkalemia and hyponatremia, presents with generalized weakness and lightheadedness.  Pt is poor historian. Unable to answer when last alcoholic drink was or if active drinker. Unable to obtain ROS.      ED course  Vitals: 97.7F, HR 65, BP 82/52, RR 18 sat 95% RA  Labs: WBC 11, Hb 11.0, K 8.0, Na 119, Cl 90, HCO3 17, Cr 5.95, BUN 72, UA+ nitrite & bacteria, pH 7.10 pco2 46, po2 38  Imaging: CXR w/o consolidation or infiltrates, EKG peaked T waves, left axis deviation  Interventions: calcium gluconate 2g, regular insulin 10u x 3, 3 amp d50, NaHCO3 50meq, CTX 1g, albuterol neb x2, 1L NS bolus x2, lasix 40mg IVP x1 (06 Jan 2022 05:05)                            8.1    5.90  )-----------( 147      ( 14 Jan 2022 12:12 )             25.1       01-14    133<L>  |  100  |  40<H>  ----------------------------<  289<H>  4.0   |  20<L>  |  1.80<H>    Ca    8.9      14 Jan 2022 12:12  Phos  3.1     01-14  Mg     1.5     01-14      Vital Signs Last 24 Hrs  T(C): 36.6 (15 Ronnie 2022 08:13), Max: 36.9 (14 Jan 2022 16:15)  T(F): 97.8 (15 Ronnie 2022 08:13), Max: 98.5 (14 Jan 2022 21:02)  HR: 70 (15 Ronnie 2022 08:13) (68 - 78)  BP: 156/65 (15 Ronnie 2022 08:13) (121/75 - 156/65)  BP(mean): --  RR: 18 (15 Ronnie 2022 08:13) (16 - 18)  SpO2: 97% (15 Ronnie 2022 08:13) (96% - 98%)    MEDICATIONS  (STANDING):  amLODIPine   Tablet 10 milliGRAM(s) Oral every 24 hours  atorvastatin 10 milliGRAM(s) Oral at bedtime  dextrose 40% Gel 15 Gram(s) Oral once  dextrose 5%. 1000 milliLiter(s) (50 mL/Hr) IV Continuous <Continuous>  dextrose 5%. 1000 milliLiter(s) (100 mL/Hr) IV Continuous <Continuous>  dextrose 50% Injectable 25 Gram(s) IV Push once  dextrose 50% Injectable 12.5 Gram(s) IV Push once  dextrose 50% Injectable 25 Gram(s) IV Push once  gabapentin 300 milliGRAM(s) Oral daily  glucagon  Injectable 1 milliGRAM(s) IntraMuscular once  heparin   Injectable 5000 Unit(s) SubCutaneous every 8 hours  hydrALAZINE 50 milliGRAM(s) Oral every 8 hours  insulin glargine Injectable (LANTUS) 3 Unit(s) SubCutaneous at bedtime  insulin lispro (ADMELOG) corrective regimen sliding scale   SubCutaneous Before meals and at bedtime  insulin lispro Injectable (ADMELOG) 2 Unit(s) SubCutaneous three times a day before meals  iron sucrose IVPB 200 milliGRAM(s) IV Intermittent every 24 hours  levothyroxine 50 MICROGram(s) Oral daily  melatonin 5 milliGRAM(s) Oral at bedtime  nicotine -  14 mG/24Hr(s) Patch 1 patch Transdermal daily  QUEtiapine 25 milliGRAM(s) Oral at bedtime  tamsulosin 0.4 milliGRAM(s) Oral at bedtime  traZODone 100 milliGRAM(s) Oral at bedtime    MEDICATIONS  (PRN):    Currently Undergoing Physical/ Occupational Therapy at bedside.    Functional Status Assessment:   1/14/2022      Cognitive/Neuro/Behavioral  Cognitive/Neuro/Behavioral [WDL Definition: Alert; opens eyes spontaneously; arouses to voice or touch; oriented x 4; follows commands; speech spontaneous, logical; purposeful motor response; behavior appropriate to situation]: WDL  Level of Consciousness: confused;  alert  Arousal Level: arouses to voice  Orientation: oriented x 4  Speech: clear  Mood/Behavior: calm;  cooperative    Language Assistance  Preferred Language to Address Healthcare Preferred Language to Address Healthcare: English    Therapeutic Interventions      Bed Mobility  Bed Mobility Training Rolling/Turning: supervision;  verbal cues;  bed rails  Bed Mobility Training Scooting: supervision;  verbal cues  Bed Mobility Training Sit-to-Supine: supervision;  verbal cues  Bed Mobility Training Supine-to-Sit: supervision;  verbal cues;  HOB elevated ~45 degrees   Bed Mobility Training Limitations: decreased ability to use arms for pushing/pulling;  decreased ability to use legs for bridging/pushing;  impaired ability to control trunk for mobility;  decreased ROM;  decreased strength;  impaired balance;  impaired postural control;  Demo improved strength with bed mobility    Sit-Stand Transfer Training  Transfer Training Sit-to-Stand Transfer: contact guard;  verbal cues;  rollator  Transfer Training Stand-to-Sit Transfer: contact guard;  verbal cues;  rollator  Sit-to-Stand Transfer Training Transfer Safety Analysis: decreased balance;  decreased weight-shifting ability;  Performed sit<>stand x 2 trials; Demo improving eccentric control during descend;  decreased flexibility;  decreased strength;  impaired balance;  impaired postural control;  rollator    Gait Training  Gait Training: contact guard;  verbal cues;  rollator;  30 feet x2;  Benefits from Min A x1 at times to improve steadiness with prolonged ambulation  Gait Analysis: shuffling;  crouch;  decreased kat;  increased time in double stance;  decreased step length;  decreased stride length;  decreased weight-shifting ability;  Demo improved balance and endurance with ambulation trial, however still demo slightly unsteady gait benefiting from PT assist for safety;  decreased flexibility;  decreased strength;  impaired balance;  impaired postural control;  impaired endurance;  30 feet x2; Limited ambulation distance 2/2 patient reports of LLE weakness/fatigue.;  rollator  Gait Number of Times:: x 2  Type of Rest Type of Rest: standing  Duration of Rest Duration of Rest: Patient with trial of void standing in bathroom in between ambulation trials for ~2 minutes.     Therapeutic Exercise  Therapeutic Exercise Charges: (B) knee extension 2x10 reps seated EOB to increase strength and prepare for sit to stand transfers and ambulation trial         PM&R Impression: as above    Current Disposition Plan Recommendations:    subacute rehab placement

## 2022-01-16 LAB
GLUCOSE BLDC GLUCOMTR-MCNC: 140 MG/DL — HIGH (ref 70–99)
GLUCOSE BLDC GLUCOMTR-MCNC: 257 MG/DL — HIGH (ref 70–99)
GLUCOSE BLDC GLUCOMTR-MCNC: 269 MG/DL — HIGH (ref 70–99)
GLUCOSE BLDC GLUCOMTR-MCNC: 355 MG/DL — HIGH (ref 70–99)

## 2022-01-16 RX ORDER — GABAPENTIN 400 MG/1
300 CAPSULE ORAL THREE TIMES A DAY
Refills: 0 | Status: DISCONTINUED | OUTPATIENT
Start: 2022-01-16 | End: 2022-01-19

## 2022-01-16 RX ADMIN — Medication 100 MILLIGRAM(S): at 21:12

## 2022-01-16 RX ADMIN — HEPARIN SODIUM 5000 UNIT(S): 5000 INJECTION INTRAVENOUS; SUBCUTANEOUS at 21:11

## 2022-01-16 RX ADMIN — Medication 2 UNIT(S): at 07:40

## 2022-01-16 RX ADMIN — Medication 3: at 18:02

## 2022-01-16 RX ADMIN — HEPARIN SODIUM 5000 UNIT(S): 5000 INJECTION INTRAVENOUS; SUBCUTANEOUS at 05:27

## 2022-01-16 RX ADMIN — ATORVASTATIN CALCIUM 10 MILLIGRAM(S): 80 TABLET, FILM COATED ORAL at 21:12

## 2022-01-16 RX ADMIN — Medication 50 MILLIGRAM(S): at 15:04

## 2022-01-16 RX ADMIN — Medication 50 MICROGRAM(S): at 05:28

## 2022-01-16 RX ADMIN — Medication 5 MILLIGRAM(S): at 21:12

## 2022-01-16 RX ADMIN — Medication 650 MILLIGRAM(S): at 12:32

## 2022-01-16 RX ADMIN — INSULIN GLARGINE 3 UNIT(S): 100 INJECTION, SOLUTION SUBCUTANEOUS at 21:46

## 2022-01-16 RX ADMIN — HEPARIN SODIUM 5000 UNIT(S): 5000 INJECTION INTRAVENOUS; SUBCUTANEOUS at 15:04

## 2022-01-16 RX ADMIN — Medication 650 MILLIGRAM(S): at 11:32

## 2022-01-16 RX ADMIN — GABAPENTIN 300 MILLIGRAM(S): 400 CAPSULE ORAL at 11:32

## 2022-01-16 RX ADMIN — Medication 2 UNIT(S): at 12:02

## 2022-01-16 RX ADMIN — Medication 5: at 21:46

## 2022-01-16 RX ADMIN — Medication 3: at 12:02

## 2022-01-16 RX ADMIN — Medication 50 MILLIGRAM(S): at 05:28

## 2022-01-16 RX ADMIN — GABAPENTIN 300 MILLIGRAM(S): 400 CAPSULE ORAL at 19:29

## 2022-01-16 RX ADMIN — TAMSULOSIN HYDROCHLORIDE 0.4 MILLIGRAM(S): 0.4 CAPSULE ORAL at 21:12

## 2022-01-16 RX ADMIN — Medication 2 UNIT(S): at 18:02

## 2022-01-16 RX ADMIN — QUETIAPINE FUMARATE 25 MILLIGRAM(S): 200 TABLET, FILM COATED ORAL at 21:12

## 2022-01-16 RX ADMIN — Medication 50 MILLIGRAM(S): at 21:12

## 2022-01-16 RX ADMIN — IRON SUCROSE 110 MILLIGRAM(S): 20 INJECTION, SOLUTION INTRAVENOUS at 15:07

## 2022-01-16 NOTE — PROGRESS NOTE ADULT - ASSESSMENT
62yo M hx of ?EtOH, cirrhosis, HTN, DM2 with neuropathy, hx of toxic megacolon c/b pneumatosis s/p colectomy and ileostomy presents with acute renal failure that required emergent HD 1/6 and 1/7  now independent of hemodialysis with resolving MEG

## 2022-01-16 NOTE — PROGRESS NOTE ADULT - SUBJECTIVE AND OBJECTIVE BOX
CC: WEAKNESS        INTERVAL HISTORY: lying in bed  feels well and wants to go home      ROS: No chest pain, no sob, no abd pain. No n/v/d    PAST MEDICAL & SURGICAL HISTORY:  DM (diabetes mellitus)    HTN (hypertension)    HLD (hyperlipidemia)    ETOH abuse    Anemia    No significant past surgical history    History of colectomy    History of ileostomy        PHYSICAL EXAM:  T(C): 36.8 (01-16-22 @ 05:00), Max: 37.4 (01-15-22 @ 14:50)  HR: 70 (01-16-22 @ 05:00)  BP: 135/64 (01-16-22 @ 05:00) (135/64 - 156/65)  RR: 16 (01-16-22 @ 05:00)  SpO2: 98% (01-16-22 @ 05:00)  Wt(kg): --  I&O's Summary    15 Ronnie 2022 07:01  -  16 Jan 2022 07:00  --------------------------------------------------------  IN: 480 mL / OUT: 2175 mL / NET: -1695 mL      Weight   General: AAO x 3,  NAD.  HEENT: moist mucous membranes, no pallor/cyanosis.  Neck: no JVD visible.  Cardiac: S1, S2. RRR. No murmurs   Respratory: CTA b/l, no access muscle use.   Abdomen: soft. nontender. nondistended  Skin: no rashes.  Extremities: no LE edema b/l  Access:       DATA:                        8.1<L>  5.90  )-----------( 147<L>    ( 14 Jan 2022 12:12 )             25.1<L>    Ferritin, Serum: 367 ng/mL (01-07 @ 06:37)      133<L>  |  100    |  40<H>  ----------------------------<  289<H>  Ca:8.9   (14 Jan 2022 12:12)  4.0     |  20<L>  |  1.80<H>                    Protein/Creatinine Ratio Calculation: 1.2 Ratio *H* (01-12 @ 02:07)          MEDICATIONS  (STANDING):  amLODIPine   Tablet 10 milliGRAM(s) Oral every 24 hours  atorvastatin 10 milliGRAM(s) Oral at bedtime  dextrose 40% Gel 15 Gram(s) Oral once  dextrose 5%. 1000 milliLiter(s) (50 mL/Hr) IV Continuous <Continuous>  dextrose 5%. 1000 milliLiter(s) (100 mL/Hr) IV Continuous <Continuous>  dextrose 50% Injectable 25 Gram(s) IV Push once  dextrose 50% Injectable 12.5 Gram(s) IV Push once  dextrose 50% Injectable 25 Gram(s) IV Push once  gabapentin 300 milliGRAM(s) Oral daily  glucagon  Injectable 1 milliGRAM(s) IntraMuscular once  heparin   Injectable 5000 Unit(s) SubCutaneous every 8 hours  hydrALAZINE 50 milliGRAM(s) Oral every 8 hours  insulin glargine Injectable (LANTUS) 3 Unit(s) SubCutaneous at bedtime  insulin lispro (ADMELOG) corrective regimen sliding scale   SubCutaneous Before meals and at bedtime  insulin lispro Injectable (ADMELOG) 2 Unit(s) SubCutaneous three times a day before meals  iron sucrose IVPB 200 milliGRAM(s) IV Intermittent every 24 hours  levothyroxine 50 MICROGram(s) Oral daily  melatonin 5 milliGRAM(s) Oral at bedtime  nicotine -  14 mG/24Hr(s) Patch 1 patch Transdermal daily  QUEtiapine 25 milliGRAM(s) Oral at bedtime  tamsulosin 0.4 milliGRAM(s) Oral at bedtime  traZODone 100 milliGRAM(s) Oral at bedtime    MEDICATIONS  (PRN):  acetaminophen     Tablet .. 650 milliGRAM(s) Oral every 6 hours PRN Temp greater or equal to 38C (100.4F), Mild Pain (1 - 3)  aluminum hydroxide/magnesium hydroxide/simethicone Suspension 30 milliLiter(s) Oral every 4 hours PRN Dyspepsia  ondansetron Injectable 4 milliGRAM(s) IV Push every 8 hours PRN Nausea and/or Vomiting

## 2022-01-16 NOTE — PROGRESS NOTE ADULT - PROBLEM SELECTOR PLAN 1
no labs since 1/14  repeat labs to follow-up on MEG  Fe sucrose for anemia  BP well controlled with Norvasc and Hydralazine

## 2022-01-17 LAB
ANION GAP SERPL CALC-SCNC: 10 MMOL/L — SIGNIFICANT CHANGE UP (ref 5–17)
BUN SERPL-MCNC: 36 MG/DL — HIGH (ref 7–23)
CALCIUM SERPL-MCNC: 9.3 MG/DL — SIGNIFICANT CHANGE UP (ref 8.4–10.5)
CHLORIDE SERPL-SCNC: 105 MMOL/L — SIGNIFICANT CHANGE UP (ref 96–108)
CO2 SERPL-SCNC: 19 MMOL/L — LOW (ref 22–31)
CREAT SERPL-MCNC: 1.68 MG/DL — HIGH (ref 0.5–1.3)
GLUCOSE BLDC GLUCOMTR-MCNC: 161 MG/DL — HIGH (ref 70–99)
GLUCOSE BLDC GLUCOMTR-MCNC: 215 MG/DL — HIGH (ref 70–99)
GLUCOSE BLDC GLUCOMTR-MCNC: 286 MG/DL — HIGH (ref 70–99)
GLUCOSE BLDC GLUCOMTR-MCNC: 339 MG/DL — HIGH (ref 70–99)
GLUCOSE SERPL-MCNC: 151 MG/DL — HIGH (ref 70–99)
MAGNESIUM SERPL-MCNC: 1.5 MG/DL — LOW (ref 1.6–2.6)
PHOSPHATE SERPL-MCNC: 3.7 MG/DL — SIGNIFICANT CHANGE UP (ref 2.5–4.5)
POTASSIUM SERPL-MCNC: 4.3 MMOL/L — SIGNIFICANT CHANGE UP (ref 3.5–5.3)
POTASSIUM SERPL-SCNC: 4.3 MMOL/L — SIGNIFICANT CHANGE UP (ref 3.5–5.3)
SODIUM SERPL-SCNC: 134 MMOL/L — LOW (ref 135–145)

## 2022-01-17 RX ADMIN — GABAPENTIN 300 MILLIGRAM(S): 400 CAPSULE ORAL at 21:47

## 2022-01-17 RX ADMIN — TAMSULOSIN HYDROCHLORIDE 0.4 MILLIGRAM(S): 0.4 CAPSULE ORAL at 21:47

## 2022-01-17 RX ADMIN — Medication 4: at 22:01

## 2022-01-17 RX ADMIN — Medication 50 MILLIGRAM(S): at 21:48

## 2022-01-17 RX ADMIN — HEPARIN SODIUM 5000 UNIT(S): 5000 INJECTION INTRAVENOUS; SUBCUTANEOUS at 21:47

## 2022-01-17 RX ADMIN — HEPARIN SODIUM 5000 UNIT(S): 5000 INJECTION INTRAVENOUS; SUBCUTANEOUS at 05:16

## 2022-01-17 RX ADMIN — INSULIN GLARGINE 3 UNIT(S): 100 INJECTION, SOLUTION SUBCUTANEOUS at 22:01

## 2022-01-17 RX ADMIN — GABAPENTIN 300 MILLIGRAM(S): 400 CAPSULE ORAL at 05:16

## 2022-01-17 RX ADMIN — Medication 2 UNIT(S): at 11:58

## 2022-01-17 RX ADMIN — Medication 1 PATCH: at 19:32

## 2022-01-17 RX ADMIN — GABAPENTIN 300 MILLIGRAM(S): 400 CAPSULE ORAL at 13:08

## 2022-01-17 RX ADMIN — IRON SUCROSE 110 MILLIGRAM(S): 20 INJECTION, SOLUTION INTRAVENOUS at 15:54

## 2022-01-17 RX ADMIN — QUETIAPINE FUMARATE 25 MILLIGRAM(S): 200 TABLET, FILM COATED ORAL at 21:47

## 2022-01-17 RX ADMIN — ATORVASTATIN CALCIUM 10 MILLIGRAM(S): 80 TABLET, FILM COATED ORAL at 21:47

## 2022-01-17 RX ADMIN — Medication 5 MILLIGRAM(S): at 21:47

## 2022-01-17 RX ADMIN — Medication 2 UNIT(S): at 06:57

## 2022-01-17 RX ADMIN — Medication 50 MICROGRAM(S): at 05:17

## 2022-01-17 RX ADMIN — Medication 100 MILLIGRAM(S): at 21:47

## 2022-01-17 RX ADMIN — Medication 2: at 11:58

## 2022-01-17 RX ADMIN — Medication 1 PATCH: at 13:08

## 2022-01-17 RX ADMIN — Medication 2 UNIT(S): at 17:28

## 2022-01-17 RX ADMIN — Medication 1: at 06:56

## 2022-01-17 RX ADMIN — HEPARIN SODIUM 5000 UNIT(S): 5000 INJECTION INTRAVENOUS; SUBCUTANEOUS at 13:08

## 2022-01-17 RX ADMIN — Medication 3: at 17:28

## 2022-01-17 NOTE — PROGRESS NOTE ADULT - ASSESSMENT
60 y/o M HTN, DM complicated by neuropathy, EtOH liver cirrhosis, hx IVDU, toxic megacolon c/b pneumatosis s/p colectomy/ileostomy who presents with generalized weakness x 3 days found to have MEG, hyponatremia, hyperkalemia with altered mental status, now s/p 2 HD sessions. Initially on [facility] now on [facility] for dispo planning pending shelter package.

## 2022-01-17 NOTE — PROGRESS NOTE ADULT - PROBLEM SELECTOR PLAN 7
RESOLVED. UA grossly positive, patient c/o dysuria, UCx growing klebsiella sensitive to ceftriaxone  - s/p ceftriaxone 2g (1/7-1/12)

## 2022-01-17 NOTE — PROGRESS NOTE ADULT - ATTENDING SUPERVISION STATEMENT
Fellow
Resident
Fellow
Resident
Resident/Fellow
Resident
Resident/Fellow
Resident
Fellow
Resident

## 2022-01-17 NOTE — PROGRESS NOTE ADULT - SUBJECTIVE AND OBJECTIVE BOX
INTERVAL HPI/OVERNIGHT EVENTS:  Shelter package sent by primary team after being cleared by physical therapy. As per night team, no overnight events. Patient seen and examined at bedside. Patient denies fever, chills, dizziness, weakness, HA, CP, SOB, N/V/D/C. 12 pt ROS otherwise negative.     VITALS  Vital Signs Last 24 Hrs  T(C): 37.2 (17 Jan 2022 08:23), Max: 37.3 (16 Jan 2022 20:00)  T(F): 99 (17 Jan 2022 08:23), Max: 99.2 (16 Jan 2022 20:00)  HR: 79 (17 Jan 2022 08:23) (72 - 79)  BP: 125/56 (17 Jan 2022 08:23) (102/63 - 155/65)  BP(mean): --  RR: 16 (17 Jan 2022 08:23) (16 - 17)  SpO2: 98% (17 Jan 2022 08:23) (97% - 98%)    CAPILLARY BLOOD GLUCOSE      POCT Blood Glucose.: 161 mg/dL (17 Jan 2022 06:51)  POCT Blood Glucose.: 355 mg/dL (16 Jan 2022 21:36)  POCT Blood Glucose.: 269 mg/dL (16 Jan 2022 17:34)  POCT Blood Glucose.: 257 mg/dL (16 Jan 2022 11:36)      PHYSICAL EXAM  General: NAD, sitting comfortably in bed   HEENT: PERRL/ EOMI, no scleral icterus, MMM  Neck: Supple, no JVD  Respiratory: lungs CTA b/l, no wheezes/crackles, no accessory muscle use  Cardiovascular: Regular rhythm/rate; +S1 +S2, no murmurs  Gastrointestinal: Soft, NTND, normoactive BS, no rebound, no guarding  Genitourinary: no suprapubic tenderness  Extremities: WWP, no cyanosis, no clubbing, no edema, pulses equal  Neurological: A&Ox3, mild tremor present while resting in chair, 4/5 strength in bilateral upper and lower extremities, follows commands  Skin: Normal temperature, warm, dry    MEDICATIONS  (STANDING):  amLODIPine   Tablet 10 milliGRAM(s) Oral every 24 hours  atorvastatin 10 milliGRAM(s) Oral at bedtime  dextrose 40% Gel 15 Gram(s) Oral once  dextrose 5%. 1000 milliLiter(s) (50 mL/Hr) IV Continuous <Continuous>  dextrose 5%. 1000 milliLiter(s) (100 mL/Hr) IV Continuous <Continuous>  dextrose 50% Injectable 25 Gram(s) IV Push once  dextrose 50% Injectable 12.5 Gram(s) IV Push once  dextrose 50% Injectable 25 Gram(s) IV Push once  gabapentin 300 milliGRAM(s) Oral three times a day  glucagon  Injectable 1 milliGRAM(s) IntraMuscular once  heparin   Injectable 5000 Unit(s) SubCutaneous every 8 hours  hydrALAZINE 50 milliGRAM(s) Oral every 8 hours  insulin glargine Injectable (LANTUS) 3 Unit(s) SubCutaneous at bedtime  insulin lispro (ADMELOG) corrective regimen sliding scale   SubCutaneous Before meals and at bedtime  insulin lispro Injectable (ADMELOG) 2 Unit(s) SubCutaneous three times a day before meals  iron sucrose IVPB 200 milliGRAM(s) IV Intermittent every 24 hours  levothyroxine 50 MICROGram(s) Oral daily  melatonin 5 milliGRAM(s) Oral at bedtime  nicotine -  14 mG/24Hr(s) Patch 1 patch Transdermal daily  QUEtiapine 25 milliGRAM(s) Oral at bedtime  tamsulosin 0.4 milliGRAM(s) Oral at bedtime  traZODone 100 milliGRAM(s) Oral at bedtime    MEDICATIONS  (PRN):  acetaminophen     Tablet .. 650 milliGRAM(s) Oral every 6 hours PRN Temp greater or equal to 38C (100.4F), Mild Pain (1 - 3)  aluminum hydroxide/magnesium hydroxide/simethicone Suspension 30 milliLiter(s) Oral every 4 hours PRN Dyspepsia  ondansetron Injectable 4 milliGRAM(s) IV Push every 8 hours PRN Nausea and/or Vomiting      No Known Allergies      LABS    01-17    134<L>  |  105  |  36<H>  ----------------------------<  151<H>  4.3   |  19<L>  |  1.68<H>    Ca    9.3      17 Jan 2022 05:46  Phos  3.7     01-17  Mg     1.5     01-17                RADIOLOGY & ADDITIONAL TESTS: Reviewed

## 2022-01-17 NOTE — PROGRESS NOTE ADULT - ATTENDING COMMENTS
Patient seen and examined at bedside. No acute events overnight. Patient reportedly cleared by PT for discharge & we are preparing shelter package. He has made significant improvement in functional status from requiring two person assist initially. Patient renal function is stable and likely at new baseline. Patient medically optimized for discharge.    Discharge Time: 40 minutes

## 2022-01-17 NOTE — PROGRESS NOTE ADULT - PROBLEM SELECTOR PLAN 3
Hx DM2. 07/2021 A1c 8.1%. Home meds Lates[facility] Bustillos    - A1c 6.7  -c/w Lantus 3u + lispro 2u TID premeal  - mISS  -continue to monitor blood glucose

## 2022-01-17 NOTE — PROGRESS NOTE ADULT - PROBLEM SELECTOR PLAN 10
F: none, HD  E: Replete as needed   N: consistent carb/renal diet  Ppx: hep subq  Dispo: [facility] --> per PT cleared for home. Now pending shelter package approval.

## 2022-01-17 NOTE — PROGRESS NOTE ADULT - SUBJECTIVE AND OBJECTIVE BOX
CC: WEAKNESS        INTERVAL HISTORY: feels okay  for possible discharge today      ROS: No chest pain, no sob, no abd pain. No n/v/d    PAST MEDICAL & SURGICAL HISTORY:  DM (diabetes mellitus)    HTN (hypertension)    HLD (hyperlipidemia)    ETOH abuse    Anemia    No significant past surgical history    History of colectomy    History of ileostomy        PHYSICAL EXAM:  T(C): 36.6 (01-17-22 @ 05:00), Max: 37.3 (01-16-22 @ 20:00)  HR: 72 (01-17-22 @ 05:00)  BP: 102/63 (01-17-22 @ 05:00) (102/63 - 155/65)  RR: 16 (01-17-22 @ 05:00)  SpO2: 97% (01-17-22 @ 05:00)  Wt(kg): --  I&O's Summary    16 Jan 2022 07:01  -  17 Jan 2022 07:00  --------------------------------------------------------  IN: 720 mL / OUT: 2675 mL / NET: -1955 mL      Weight   General: AAO x 3,  NAD.  HEENT: moist mucous membranes, no pallor/cyanosis.  Neck: no JVD visible.  Cardiac: S1, S2. RRR. No murmurs   Respratory: CTA b/l, no access muscle use.   Abdomen: soft. nontender. nondistended; + colostomy  Skin: no rashes.  Extremities: no LE edema b/l  Access:       DATA:    Ferritin, Serum: 367 ng/mL (01-07 @ 06:37)      134<L>  |  105    |  36<H>  ----------------------------<  151<H>  Ca:9.3   (17 Jan 2022 05:46)  4.3     |  19<L>  |  1.68<H>      eGFR if Non : 43 *L*  eGFR if : 50 *L*                Protein/Creatinine Ratio Calculation: 1.2 Ratio *H* (01-12 @ 02:07)          MEDICATIONS  (STANDING):  amLODIPine   Tablet 10 milliGRAM(s) Oral every 24 hours  atorvastatin 10 milliGRAM(s) Oral at bedtime  dextrose 40% Gel 15 Gram(s) Oral once  dextrose 5%. 1000 milliLiter(s) (50 mL/Hr) IV Continuous <Continuous>  dextrose 5%. 1000 milliLiter(s) (100 mL/Hr) IV Continuous <Continuous>  dextrose 50% Injectable 25 Gram(s) IV Push once  dextrose 50% Injectable 12.5 Gram(s) IV Push once  dextrose 50% Injectable 25 Gram(s) IV Push once  gabapentin 300 milliGRAM(s) Oral three times a day  glucagon  Injectable 1 milliGRAM(s) IntraMuscular once  heparin   Injectable 5000 Unit(s) SubCutaneous every 8 hours  hydrALAZINE 50 milliGRAM(s) Oral every 8 hours  insulin glargine Injectable (LANTUS) 3 Unit(s) SubCutaneous at bedtime  insulin lispro (ADMELOG) corrective regimen sliding scale   SubCutaneous Before meals and at bedtime  insulin lispro Injectable (ADMELOG) 2 Unit(s) SubCutaneous three times a day before meals  iron sucrose IVPB 200 milliGRAM(s) IV Intermittent every 24 hours  levothyroxine 50 MICROGram(s) Oral daily  melatonin 5 milliGRAM(s) Oral at bedtime  nicotine -  14 mG/24Hr(s) Patch 1 patch Transdermal daily  QUEtiapine 25 milliGRAM(s) Oral at bedtime  tamsulosin 0.4 milliGRAM(s) Oral at bedtime  traZODone 100 milliGRAM(s) Oral at bedtime    MEDICATIONS  (PRN):  acetaminophen     Tablet .. 650 milliGRAM(s) Oral every 6 hours PRN Temp greater or equal to 38C (100.4F), Mild Pain (1 - 3)  aluminum hydroxide/magnesium hydroxide/simethicone Suspension 30 milliLiter(s) Oral every 4 hours PRN Dyspepsia  ondansetron Injectable 4 milliGRAM(s) IV Push every 8 hours PRN Nausea and/or Vomiting

## 2022-01-18 LAB
GLUCOSE BLDC GLUCOMTR-MCNC: 150 MG/DL — HIGH (ref 70–99)
GLUCOSE BLDC GLUCOMTR-MCNC: 268 MG/DL — HIGH (ref 70–99)
GLUCOSE BLDC GLUCOMTR-MCNC: 289 MG/DL — HIGH (ref 70–99)
GLUCOSE BLDC GLUCOMTR-MCNC: 368 MG/DL — HIGH (ref 70–99)
SARS-COV-2 RNA SPEC QL NAA+PROBE: SIGNIFICANT CHANGE UP

## 2022-01-18 PROCEDURE — 99233 SBSQ HOSP IP/OBS HIGH 50: CPT | Mod: GC

## 2022-01-18 RX ORDER — BACLOFEN 100 %
1 POWDER (GRAM) MISCELLANEOUS
Qty: 0 | Refills: 0 | DISCHARGE

## 2022-01-18 RX ORDER — SITAGLIPTIN AND METFORMIN HYDROCHLORIDE 500; 50 MG/1; MG/1
1 TABLET, FILM COATED ORAL
Qty: 0 | Refills: 0 | DISCHARGE

## 2022-01-18 RX ORDER — LEVOTHYROXINE SODIUM 125 MCG
1 TABLET ORAL
Qty: 0 | Refills: 0 | DISCHARGE

## 2022-01-18 RX ORDER — FERROUS SULFATE 325(65) MG
1 TABLET ORAL
Qty: 0 | Refills: 0 | DISCHARGE

## 2022-01-18 RX ORDER — HYDRALAZINE HCL 50 MG
1 TABLET ORAL
Qty: 21 | Refills: 0
Start: 2022-01-18 | End: 2022-01-24

## 2022-01-18 RX ORDER — DULOXETINE HYDROCHLORIDE 30 MG/1
1 CAPSULE, DELAYED RELEASE ORAL
Qty: 7 | Refills: 0
Start: 2022-01-18 | End: 2022-01-24

## 2022-01-18 RX ORDER — TAMSULOSIN HYDROCHLORIDE 0.4 MG/1
1 CAPSULE ORAL
Qty: 7 | Refills: 0
Start: 2022-01-18 | End: 2022-01-24

## 2022-01-18 RX ORDER — BENZOYL PEROXIDE MICRONIZED 5.8 %
1 TOWELETTE (EA) TOPICAL
Qty: 0 | Refills: 0 | DISCHARGE

## 2022-01-18 RX ORDER — SITAGLIPTIN AND METFORMIN HYDROCHLORIDE 500; 50 MG/1; MG/1
1 TABLET, FILM COATED ORAL
Qty: 14 | Refills: 0
Start: 2022-01-18 | End: 2022-01-24

## 2022-01-18 RX ORDER — GABAPENTIN 400 MG/1
1 CAPSULE ORAL
Qty: 21 | Refills: 0
Start: 2022-01-18 | End: 2022-01-24

## 2022-01-18 RX ORDER — DULOXETINE HYDROCHLORIDE 30 MG/1
1 CAPSULE, DELAYED RELEASE ORAL
Qty: 0 | Refills: 0 | DISCHARGE

## 2022-01-18 RX ORDER — LEVOTHYROXINE SODIUM 125 MCG
1 TABLET ORAL
Qty: 7 | Refills: 0
Start: 2022-01-18 | End: 2022-01-24

## 2022-01-18 RX ORDER — PANTOPRAZOLE SODIUM 20 MG/1
1 TABLET, DELAYED RELEASE ORAL
Qty: 14 | Refills: 0
Start: 2022-01-18 | End: 2022-01-24

## 2022-01-18 RX ORDER — TRAZODONE HCL 50 MG
1 TABLET ORAL
Qty: 7 | Refills: 0
Start: 2022-01-18 | End: 2022-01-24

## 2022-01-18 RX ORDER — FERROUS SULFATE 325(65) MG
1 TABLET ORAL
Qty: 7 | Refills: 0
Start: 2022-01-18 | End: 2022-01-24

## 2022-01-18 RX ORDER — AMLODIPINE BESYLATE 2.5 MG/1
1 TABLET ORAL
Qty: 7 | Refills: 0
Start: 2022-01-18 | End: 2022-01-24

## 2022-01-18 RX ADMIN — Medication 1 PATCH: at 06:59

## 2022-01-18 RX ADMIN — GABAPENTIN 300 MILLIGRAM(S): 400 CAPSULE ORAL at 05:29

## 2022-01-18 RX ADMIN — Medication 50 MICROGRAM(S): at 05:29

## 2022-01-18 RX ADMIN — INSULIN GLARGINE 3 UNIT(S): 100 INJECTION, SOLUTION SUBCUTANEOUS at 21:49

## 2022-01-18 RX ADMIN — GABAPENTIN 300 MILLIGRAM(S): 400 CAPSULE ORAL at 13:16

## 2022-01-18 RX ADMIN — Medication 50 MILLIGRAM(S): at 21:49

## 2022-01-18 RX ADMIN — Medication 5 MILLIGRAM(S): at 21:50

## 2022-01-18 RX ADMIN — HEPARIN SODIUM 5000 UNIT(S): 5000 INJECTION INTRAVENOUS; SUBCUTANEOUS at 13:16

## 2022-01-18 RX ADMIN — HEPARIN SODIUM 5000 UNIT(S): 5000 INJECTION INTRAVENOUS; SUBCUTANEOUS at 21:50

## 2022-01-18 RX ADMIN — Medication 2 UNIT(S): at 07:08

## 2022-01-18 RX ADMIN — IRON SUCROSE 110 MILLIGRAM(S): 20 INJECTION, SOLUTION INTRAVENOUS at 16:50

## 2022-01-18 RX ADMIN — ATORVASTATIN CALCIUM 10 MILLIGRAM(S): 80 TABLET, FILM COATED ORAL at 21:49

## 2022-01-18 RX ADMIN — Medication 100 MILLIGRAM(S): at 21:49

## 2022-01-18 RX ADMIN — GABAPENTIN 300 MILLIGRAM(S): 400 CAPSULE ORAL at 21:49

## 2022-01-18 RX ADMIN — Medication 3: at 21:50

## 2022-01-18 RX ADMIN — QUETIAPINE FUMARATE 25 MILLIGRAM(S): 200 TABLET, FILM COATED ORAL at 21:49

## 2022-01-18 RX ADMIN — Medication 5: at 18:02

## 2022-01-18 RX ADMIN — Medication 2 UNIT(S): at 18:02

## 2022-01-18 RX ADMIN — TAMSULOSIN HYDROCHLORIDE 0.4 MILLIGRAM(S): 0.4 CAPSULE ORAL at 21:49

## 2022-01-18 RX ADMIN — Medication 2 UNIT(S): at 12:31

## 2022-01-18 RX ADMIN — HEPARIN SODIUM 5000 UNIT(S): 5000 INJECTION INTRAVENOUS; SUBCUTANEOUS at 05:29

## 2022-01-18 RX ADMIN — Medication 50 MILLIGRAM(S): at 05:29

## 2022-01-18 RX ADMIN — Medication 3: at 12:31

## 2022-01-18 NOTE — PROGRESS NOTE ADULT - PROBLEM SELECTOR PLAN 1
Non oliguric MEG 2/2 ATN vs toxic ingestion, briefly requiring 2 HD sessions.  - Resolved    Plan:   - Continue lab holiday  - monitor urine output   - hold home cymbalta and baclofen   - avoid nephrotoxic agents  - c/w flomax 0.4 Qd    #RLE pain  - Started gabapentin 300mg Qd

## 2022-01-18 NOTE — CHART NOTE - NSCHARTNOTEFT_GEN_A_CORE
Admitting Diagnosis:   Patient is a 62y old  Male who presents with a chief complaint of electrolyte abnormalities (17 Jan 2022 07:34)      PAST MEDICAL & SURGICAL HISTORY:  DM (diabetes mellitus)    HTN (hypertension)    HLD (hyperlipidemia)    ETOH abuse    Anemia    History of colectomy    History of ileostomy        Current Nutrition Order:  DASH, CSTCHO, 60g Protein Restriction    PO Intake: Good (%) [   ]  Fair (50-75%) [  x ] Poor (<25%) [   ]  Consuming chicken and fish.     GI Issues:   No apparent GI distress  Colostomy in place- w/ 775cc x24hrs   ordered for simethicone and zofran    Pain:  No pain reported.    Skin Integrity:  R toe amputated      Labs:   01-17    134<L>  |  105  |  36<H>  ----------------------------<  151<H>  4.3   |  19<L>  |  1.68<H>    Ca    9.3      17 Jan 2022 05:46  Phos  3.7     01-17  Mg     1.5     01-17      CAPILLARY BLOOD GLUCOSE      POCT Blood Glucose.: 150 mg/dL (18 Jan 2022 06:54)  POCT Blood Glucose.: 339 mg/dL (17 Jan 2022 21:38)  POCT Blood Glucose.: 286 mg/dL (17 Jan 2022 17:08)  POCT Blood Glucose.: 215 mg/dL (17 Jan 2022 11:41)      Medications:  MEDICATIONS  (STANDING):  amLODIPine   Tablet 10 milliGRAM(s) Oral every 24 hours  atorvastatin 10 milliGRAM(s) Oral at bedtime  dextrose 40% Gel 15 Gram(s) Oral once  dextrose 5%. 1000 milliLiter(s) (50 mL/Hr) IV Continuous <Continuous>  dextrose 5%. 1000 milliLiter(s) (100 mL/Hr) IV Continuous <Continuous>  dextrose 50% Injectable 25 Gram(s) IV Push once  dextrose 50% Injectable 12.5 Gram(s) IV Push once  dextrose 50% Injectable 25 Gram(s) IV Push once  gabapentin 300 milliGRAM(s) Oral three times a day  glucagon  Injectable 1 milliGRAM(s) IntraMuscular once  heparin   Injectable 5000 Unit(s) SubCutaneous every 8 hours  hydrALAZINE 50 milliGRAM(s) Oral every 8 hours  insulin glargine Injectable (LANTUS) 3 Unit(s) SubCutaneous at bedtime  insulin lispro (ADMELOG) corrective regimen sliding scale   SubCutaneous Before meals and at bedtime  insulin lispro Injectable (ADMELOG) 2 Unit(s) SubCutaneous three times a day before meals  iron sucrose IVPB 200 milliGRAM(s) IV Intermittent every 24 hours  levothyroxine 50 MICROGram(s) Oral daily  melatonin 5 milliGRAM(s) Oral at bedtime  nicotine -  14 mG/24Hr(s) Patch 1 patch Transdermal daily  QUEtiapine 25 milliGRAM(s) Oral at bedtime  tamsulosin 0.4 milliGRAM(s) Oral at bedtime  traZODone 100 milliGRAM(s) Oral at bedtime    MEDICATIONS  (PRN):  acetaminophen     Tablet .. 650 milliGRAM(s) Oral every 6 hours PRN Temp greater or equal to 38C (100.4F), Mild Pain (1 - 3)  aluminum hydroxide/magnesium hydroxide/simethicone Suspension 30 milliLiter(s) Oral every 4 hours PRN Dyspepsia  ondansetron Injectable 4 milliGRAM(s) IV Push every 8 hours PRN Nausea and/or Vomiting      Admitted Anthropometrics:  Height: 5'6" Weight: 164lbs, IBW 142lbs+/-10%, %%, BMI 26.6    Weight Change:   No new weights    Estimated energy needs:   ABW used for calculations as pt between % of IBW.   Nutrient needs based on [facility] standards of care for maintenance in adults.   1720-2019kcal/day (23-27kcal/kg)  75-90g pro/day (1-1.2g pro/kg) 2/2 no longer on dialysis and Cr continuing to trend down.   Fluids per team.     Subjective:   62 y/o M HTN, DM complicated by neuropathy, EtOH liver cirrhosis, hx IVDU, toxic megacolon c/b pneumatosis s/p colectomy/ileostomy who presents with generalized weakness x 3 days found to have MEG, hyponatremia, hyperkalemia with altered mental status, now s/p 2 HD sessions. Initially on 7La, now stable for stepdown to [facility] for dispo planning and further collateral regarding baseline mental status. Now with return fo mental status and resolving MEG while off dialysis. Patient reports he is discharging today.    Pt seen in room, resting in bed. Currently A&Ox4. Endorsing fair appetite, planning to eat fish, rice and soup for lunch. Encouraged protein options however pt with limited food availability 2/2 discharging to shelter. Will see if patient can have a few Ensures in the mean time. Of note, pt was last admitted 7/2021 where he was documented to weigh 66kg. Able to obtain more subjective information on weight history at this follow up 2/2 return of mental status. Reports UBW is around 175lbs. Admitted weight is 165lbs. Was noticed to have some bony protrusions in clavicular region and likely poor PO intake prior to admission given living circumstances, electrolyte abnormalities and MEG on admission. Suspect moderate PCM. No food allergies documented in EMR. Noted to have R to amputated. GI: colostomy w/ 775cc output x24hrs. Notable labs: POCT , 229, 286, 215, 161, Na 134, BUN 36, Cr 1.68, Mg 1.5. Please continue to replete lytes PRN. RD to follow.     Previous Nutrition Diagnosis:  Increased nutrient needs RT hypermetabolic state AEB ARF, need for emergent HD   Active [x   ]  Resolved [   ]    If resolved, new PES:     Goal:  Pt to consistently meet % of estimated needs PO     Recommendations:  1. Continue on current diet order  2. If intake is <50% recommend adding on Ensure Enlive BID (700kcal, 40g pro)  3. MVI daily  4. BMP, BG Q6hrs, CBC per MD discretion  5. Please trend colostomy output. Replete fluids and lytes PRN.   *d/w team.     Education:   Protein/varied diet as feasible/accessible.     Risk Level: High [ x  ] Moderate [   ] Low [   ].

## 2022-01-18 NOTE — PROGRESS NOTE ADULT - ASSESSMENT
62 y/o M HTN, DM complicated by neuropathy, EtOH liver cirrhosis, hx IVDU, toxic megacolon c/b pneumatosis s/p colectomy/ileostomy who presents with generalized weakness x 3 days found to have MEG, hyponatremia, hyperkalemia with altered mental status, now s/p 2 HD sessions. Initially on 7La now on [facility] for dispo planning pending shelter package.

## 2022-01-18 NOTE — DIETITIAN NUTRITION RISK NOTIFICATION - ADDITIONAL COMMENTS/DIETITIAN RECOMMENDATIONS
Reports UBW is around 175lbs. Admitted weight is 165lbs. Was noticed to have some bony protrusions in clavicular region and likely poor PO intake prior to admission given living circumstances, electrolyte abnormalities and MEG on admission. Suspect moderate PCM.

## 2022-01-18 NOTE — PROGRESS NOTE ADULT - SUBJECTIVE AND OBJECTIVE BOX
OVERNIGHT EVENTS: naeo    SUBJECTIVE / INTERVAL HPI: Patient seen and examined at bedside. No complaints       PHYSICAL EXAM  General: NAD, sitting comfortably in bed   HEENT: PERRL/ EOMI, no scleral icterus, MMM  Neck: Supple, no JVD  Respiratory: lungs CTA b/l, no wheezes/crackles, no accessory muscle use  Cardiovascular: Regular rhythm/rate; +S1 +S2, no murmurs  Gastrointestinal: Soft, NTND, normoactive BS, no rebound, no guarding  Genitourinary: no suprapubic tenderness  Extremities: WWP, no cyanosis, no clubbing, no edema, pulses equal  Neurological: A&Ox3, mild tremor present while resting in chair, 4/5 strength in bilateral upper and lower extremities, follows commands  Skin: Normal temperature, warm, dry    VITAL SIGNS:  Vital Signs Last 24 Hrs  T(C): 36.6 (18 Jan 2022 08:24), Max: 37.2 (17 Jan 2022 20:20)  T(F): 97.9 (18 Jan 2022 08:24), Max: 99 (17 Jan 2022 20:20)  HR: 76 (18 Jan 2022 08:24) (68 - 76)  BP: 149/63 (18 Jan 2022 08:24) (122/61 - 149/63)  BP(mean): --  RR: 16 (18 Jan 2022 08:24) (16 - 16)  SpO2: 98% (18 Jan 2022 08:24) (98% - 98%)      MEDICATIONS:  MEDICATIONS  (STANDING):  amLODIPine   Tablet 10 milliGRAM(s) Oral every 24 hours  atorvastatin 10 milliGRAM(s) Oral at bedtime  dextrose 40% Gel 15 Gram(s) Oral once  dextrose 5%. 1000 milliLiter(s) (50 mL/Hr) IV Continuous <Continuous>  dextrose 5%. 1000 milliLiter(s) (100 mL/Hr) IV Continuous <Continuous>  dextrose 50% Injectable 25 Gram(s) IV Push once  dextrose 50% Injectable 12.5 Gram(s) IV Push once  dextrose 50% Injectable 25 Gram(s) IV Push once  gabapentin 300 milliGRAM(s) Oral three times a day  glucagon  Injectable 1 milliGRAM(s) IntraMuscular once  heparin   Injectable 5000 Unit(s) SubCutaneous every 8 hours  hydrALAZINE 50 milliGRAM(s) Oral every 8 hours  insulin glargine Injectable (LANTUS) 3 Unit(s) SubCutaneous at bedtime  insulin lispro (ADMELOG) corrective regimen sliding scale   SubCutaneous Before meals and at bedtime  insulin lispro Injectable (ADMELOG) 2 Unit(s) SubCutaneous three times a day before meals  iron sucrose IVPB 200 milliGRAM(s) IV Intermittent every 24 hours  levothyroxine 50 MICROGram(s) Oral daily  melatonin 5 milliGRAM(s) Oral at bedtime  nicotine -  14 mG/24Hr(s) Patch 1 patch Transdermal daily  QUEtiapine 25 milliGRAM(s) Oral at bedtime  tamsulosin 0.4 milliGRAM(s) Oral at bedtime  traZODone 100 milliGRAM(s) Oral at bedtime    MEDICATIONS  (PRN):  acetaminophen     Tablet .. 650 milliGRAM(s) Oral every 6 hours PRN Temp greater or equal to 38C (100.4F), Mild Pain (1 - 3)  aluminum hydroxide/magnesium hydroxide/simethicone Suspension 30 milliLiter(s) Oral every 4 hours PRN Dyspepsia  ondansetron Injectable 4 milliGRAM(s) IV Push every 8 hours PRN Nausea and/or Vomiting      ALLERGIES:  Allergies    No Known Allergies    Intolerances        LABS:    01-17    134<L>  |  105  |  36<H>  ----------------------------<  151<H>  4.3   |  19<L>  |  1.68<H>    Ca    9.3      17 Jan 2022 05:46  Phos  3.7     01-17  Mg     1.5     01-17          CAPILLARY BLOOD GLUCOSE      POCT Blood Glucose.: 289 mg/dL (18 Jan 2022 12:14)      RADIOLOGY & ADDITIONAL TESTS: Reviewed. Hospital Course  60 y/o M HTN, DM complicated by neuropathy, EtOH liver cirrhosis, hx IVDU, toxic megacolon c/b pneumatosis s/p colectomy/ileostomy who presents with generalized weakness x 3 days found to have MEG 2/2 ATN vs toxic ingestion vs UTI with hyponatremia, hyperkalemia and hyperphosphatemia, required monitoring in the MICU for AMS and need for urgent hemodialysis. Intermittently required pressors due to hypotension while on dialysis. Mental status improved s/p 2 HD session with no further requirement per renal. Completed course of IV CTX for UTI, now stable for discharge to shelter pending shelter approval    OVERNIGHT EVENTS: naeo    SUBJECTIVE / INTERVAL HPI: Patient seen and examined at bedside. No complaints       PHYSICAL EXAM  General: NAD, sitting comfortably in bed   HEENT: PERRL/ EOMI, no scleral icterus, MMM  Neck: Supple, no JVD  Respiratory: lungs CTA b/l, no wheezes/crackles, no accessory muscle use  Cardiovascular: Regular rhythm/rate; +S1 +S2, no murmurs  Gastrointestinal: Soft, NTND, normoactive BS, no rebound, no guarding  Genitourinary: no suprapubic tenderness  Extremities: WWP, no cyanosis, no clubbing, no edema, pulses equal  Neurological: A&Ox3, mild tremor present while resting in chair, 4/5 strength in bilateral upper and lower extremities, follows commands  Skin: Normal temperature, warm, dry    VITAL SIGNS:  Vital Signs Last 24 Hrs  T(C): 36.6 (18 Jan 2022 08:24), Max: 37.2 (17 Jan 2022 20:20)  T(F): 97.9 (18 Jan 2022 08:24), Max: 99 (17 Jan 2022 20:20)  HR: 76 (18 Jan 2022 08:24) (68 - 76)  BP: 149/63 (18 Jan 2022 08:24) (122/61 - 149/63)  BP(mean): --  RR: 16 (18 Jan 2022 08:24) (16 - 16)  SpO2: 98% (18 Jan 2022 08:24) (98% - 98%)      MEDICATIONS:  MEDICATIONS  (STANDING):  amLODIPine   Tablet 10 milliGRAM(s) Oral every 24 hours  atorvastatin 10 milliGRAM(s) Oral at bedtime  dextrose 40% Gel 15 Gram(s) Oral once  dextrose 5%. 1000 milliLiter(s) (50 mL/Hr) IV Continuous <Continuous>  dextrose 5%. 1000 milliLiter(s) (100 mL/Hr) IV Continuous <Continuous>  dextrose 50% Injectable 25 Gram(s) IV Push once  dextrose 50% Injectable 12.5 Gram(s) IV Push once  dextrose 50% Injectable 25 Gram(s) IV Push once  gabapentin 300 milliGRAM(s) Oral three times a day  glucagon  Injectable 1 milliGRAM(s) IntraMuscular once  heparin   Injectable 5000 Unit(s) SubCutaneous every 8 hours  hydrALAZINE 50 milliGRAM(s) Oral every 8 hours  insulin glargine Injectable (LANTUS) 3 Unit(s) SubCutaneous at bedtime  insulin lispro (ADMELOG) corrective regimen sliding scale   SubCutaneous Before meals and at bedtime  insulin lispro Injectable (ADMELOG) 2 Unit(s) SubCutaneous three times a day before meals  iron sucrose IVPB 200 milliGRAM(s) IV Intermittent every 24 hours  levothyroxine 50 MICROGram(s) Oral daily  melatonin 5 milliGRAM(s) Oral at bedtime  nicotine -  14 mG/24Hr(s) Patch 1 patch Transdermal daily  QUEtiapine 25 milliGRAM(s) Oral at bedtime  tamsulosin 0.4 milliGRAM(s) Oral at bedtime  traZODone 100 milliGRAM(s) Oral at bedtime    MEDICATIONS  (PRN):  acetaminophen     Tablet .. 650 milliGRAM(s) Oral every 6 hours PRN Temp greater or equal to 38C (100.4F), Mild Pain (1 - 3)  aluminum hydroxide/magnesium hydroxide/simethicone Suspension 30 milliLiter(s) Oral every 4 hours PRN Dyspepsia  ondansetron Injectable 4 milliGRAM(s) IV Push every 8 hours PRN Nausea and/or Vomiting      ALLERGIES:  Allergies    No Known Allergies    Intolerances        LABS:    01-17    134<L>  |  105  |  36<H>  ----------------------------<  151<H>  4.3   |  19<L>  |  1.68<H>    Ca    9.3      17 Jan 2022 05:46  Phos  3.7     01-17  Mg     1.5     01-17          CAPILLARY BLOOD GLUCOSE      POCT Blood Glucose.: 289 mg/dL (18 Jan 2022 12:14)      RADIOLOGY & ADDITIONAL TESTS: Reviewed.

## 2022-01-18 NOTE — DIETITIAN NUTRITION RISK NOTIFICATION - WEIGHT LOSS
Problem: Falls - Risk of:  Goal: Will remain free from falls  Description  Will remain free from falls  2/18/2020 1643 by Damaris Almazan RN  Outcome: Met This Shift     Problem: Falls - Risk of:  Goal: Absence of physical injury  Description  Absence of physical injury  2/18/2020 1643 by Damaris Almazan RN  Outcome: Met This Shift     Problem: Pain:  Goal: Pain level will decrease  Description  Pain level will decrease  2/18/2020 1643 by Damaris Almazan RN  Outcome: Met This Shift     Problem: Pain:  Goal: Control of acute pain  Description  Control of acute pain  2/18/2020 1643 by Damaris Almazan RN  Outcome: Met This Shift     Problem: Skin Integrity - Impaired:  Goal: Will show no infection signs and symptoms  Description  Will show no infection signs and symptoms  2/18/2020 1643 by Damaris lAmazan RN  Outcome: Met This Shift     Problem: Skin Integrity - Impaired:  Goal: Absence of new skin breakdown  Description  Absence of new skin breakdown  2/18/2020 1643 by Damaris Almazan RN  Outcome: Met This Shift     Problem: Discharge Planning:  Goal: Ability to perform activities of daily living will improve  Description  Ability to perform activities of daily living will improve  2/18/2020 1643 by Damaris Almazan RN  Outcome: Met This Shift     Problem: Discharge Planning:  Goal: Participates in care planning  Description  Participates in care planning  2/18/2020 1643 by Damaris Almazan RN  Outcome: Met This Shift     Problem: Injury - Risk of, Physical Injury:  Goal: Will remain free from falls  Description  Will remain free from falls  2/18/2020 1643 by Damaris Almazan RN  Outcome: Met This Shift     Problem: Injury - Risk of, Physical Injury:  Goal: Absence of physical injury  Description  Absence of physical injury  2/18/2020 1643 by Damaris Almazan RN  Outcome: Met This Shift     Problem: Mood - Altered:  Goal: Mood stable  Description  Mood stable  Outcome: Met This Shift
5% in 1 month
no previous reaction

## 2022-01-18 NOTE — CHART NOTE - NSCHARTNOTESELECT_GEN_ALL_CORE
Nutrition Services
Change in Status/Event Note
Event Note
Event Note
Nutrition Services
Nutrition Services

## 2022-01-18 NOTE — DIETITIAN NUTRITION RISK NOTIFICATION - TREATMENT: THE FOLLOWING DIET HAS BEEN RECOMMENDED
1. Continue on current diet order  2. If intake is <50% recommend adding on Ensure Enlive BID (700kcal, 40g pro)  3. MVI daily  4. BMP, BG Q6hrs, CBC per MD discretion  5. Please trend colostomy output. Replete fluids and lytes PRN.   *d/w team.

## 2022-01-19 ENCOUNTER — TRANSCRIPTION ENCOUNTER (OUTPATIENT)
Age: 62
End: 2022-01-19

## 2022-01-19 VITALS
HEART RATE: 72 BPM | DIASTOLIC BLOOD PRESSURE: 54 MMHG | TEMPERATURE: 98 F | SYSTOLIC BLOOD PRESSURE: 118 MMHG | RESPIRATION RATE: 18 BRPM | OXYGEN SATURATION: 98 %

## 2022-01-19 LAB
GLUCOSE BLDC GLUCOMTR-MCNC: 130 MG/DL — HIGH (ref 70–99)
GLUCOSE BLDC GLUCOMTR-MCNC: 316 MG/DL — HIGH (ref 70–99)

## 2022-01-19 PROCEDURE — 83550 IRON BINDING TEST: CPT

## 2022-01-19 PROCEDURE — 99291 CRITICAL CARE FIRST HOUR: CPT | Mod: 25

## 2022-01-19 PROCEDURE — 84100 ASSAY OF PHOSPHORUS: CPT

## 2022-01-19 PROCEDURE — 84156 ASSAY OF PROTEIN URINE: CPT

## 2022-01-19 PROCEDURE — 82570 ASSAY OF URINE CREATININE: CPT

## 2022-01-19 PROCEDURE — 85610 PROTHROMBIN TIME: CPT

## 2022-01-19 PROCEDURE — 80053 COMPREHEN METABOLIC PANEL: CPT

## 2022-01-19 PROCEDURE — 83036 HEMOGLOBIN GLYCOSYLATED A1C: CPT

## 2022-01-19 PROCEDURE — 84165 PROTEIN E-PHORESIS SERUM: CPT

## 2022-01-19 PROCEDURE — 80307 DRUG TEST PRSMV CHEM ANLYZR: CPT

## 2022-01-19 PROCEDURE — 93005 ELECTROCARDIOGRAM TRACING: CPT

## 2022-01-19 PROCEDURE — 82803 BLOOD GASES ANY COMBINATION: CPT

## 2022-01-19 PROCEDURE — 86900 BLOOD TYPING SEROLOGIC ABO: CPT

## 2022-01-19 PROCEDURE — 87635 SARS-COV-2 COVID-19 AMP PRB: CPT

## 2022-01-19 PROCEDURE — 83935 ASSAY OF URINE OSMOLALITY: CPT

## 2022-01-19 PROCEDURE — 96375 TX/PRO/DX INJ NEW DRUG ADDON: CPT

## 2022-01-19 PROCEDURE — 84132 ASSAY OF SERUM POTASSIUM: CPT

## 2022-01-19 PROCEDURE — 97530 THERAPEUTIC ACTIVITIES: CPT

## 2022-01-19 PROCEDURE — 93306 TTE W/DOPPLER COMPLETE: CPT

## 2022-01-19 PROCEDURE — 87340 HEPATITIS B SURFACE AG IA: CPT

## 2022-01-19 PROCEDURE — 83540 ASSAY OF IRON: CPT

## 2022-01-19 PROCEDURE — 82962 GLUCOSE BLOOD TEST: CPT

## 2022-01-19 PROCEDURE — 36415 COLL VENOUS BLD VENIPUNCTURE: CPT

## 2022-01-19 PROCEDURE — 86901 BLOOD TYPING SEROLOGIC RH(D): CPT

## 2022-01-19 PROCEDURE — 97116 GAIT TRAINING THERAPY: CPT

## 2022-01-19 PROCEDURE — 85730 THROMBOPLASTIN TIME PARTIAL: CPT

## 2022-01-19 PROCEDURE — 83735 ASSAY OF MAGNESIUM: CPT

## 2022-01-19 PROCEDURE — 82436 ASSAY OF URINE CHLORIDE: CPT

## 2022-01-19 PROCEDURE — 83690 ASSAY OF LIPASE: CPT

## 2022-01-19 PROCEDURE — 86850 RBC ANTIBODY SCREEN: CPT

## 2022-01-19 PROCEDURE — 85025 COMPLETE CBC W/AUTO DIFF WBC: CPT

## 2022-01-19 PROCEDURE — 97110 THERAPEUTIC EXERCISES: CPT

## 2022-01-19 PROCEDURE — 82550 ASSAY OF CK (CPK): CPT

## 2022-01-19 PROCEDURE — 87086 URINE CULTURE/COLONY COUNT: CPT

## 2022-01-19 PROCEDURE — 83605 ASSAY OF LACTIC ACID: CPT

## 2022-01-19 PROCEDURE — 97161 PT EVAL LOW COMPLEX 20 MIN: CPT

## 2022-01-19 PROCEDURE — 81001 URINALYSIS AUTO W/SCOPE: CPT

## 2022-01-19 PROCEDURE — 82746 ASSAY OF FOLIC ACID SERUM: CPT

## 2022-01-19 PROCEDURE — 70450 CT HEAD/BRAIN W/O DYE: CPT

## 2022-01-19 PROCEDURE — 96374 THER/PROPH/DIAG INJ IV PUSH: CPT

## 2022-01-19 PROCEDURE — 80048 BASIC METABOLIC PNL TOTAL CA: CPT

## 2022-01-19 PROCEDURE — 87186 SC STD MICRODIL/AGAR DIL: CPT

## 2022-01-19 PROCEDURE — 94660 CPAP INITIATION&MGMT: CPT

## 2022-01-19 PROCEDURE — 76775 US EXAM ABDO BACK WALL LIM: CPT

## 2022-01-19 PROCEDURE — 87077 CULTURE AEROBIC IDENTIFY: CPT

## 2022-01-19 PROCEDURE — 82728 ASSAY OF FERRITIN: CPT

## 2022-01-19 PROCEDURE — 83986 ASSAY PH BODY FLUID NOS: CPT

## 2022-01-19 PROCEDURE — 87040 BLOOD CULTURE FOR BACTERIA: CPT

## 2022-01-19 PROCEDURE — 86706 HEP B SURFACE ANTIBODY: CPT

## 2022-01-19 PROCEDURE — 82140 ASSAY OF AMMONIA: CPT

## 2022-01-19 PROCEDURE — 82607 VITAMIN B-12: CPT

## 2022-01-19 PROCEDURE — 84484 ASSAY OF TROPONIN QUANT: CPT

## 2022-01-19 PROCEDURE — 90935 HEMODIALYSIS ONE EVALUATION: CPT

## 2022-01-19 PROCEDURE — 85027 COMPLETE CBC AUTOMATED: CPT

## 2022-01-19 PROCEDURE — 84133 ASSAY OF URINE POTASSIUM: CPT

## 2022-01-19 PROCEDURE — 84300 ASSAY OF URINE SODIUM: CPT

## 2022-01-19 PROCEDURE — 82533 TOTAL CORTISOL: CPT

## 2022-01-19 PROCEDURE — 94640 AIRWAY INHALATION TREATMENT: CPT

## 2022-01-19 PROCEDURE — 82340 ASSAY OF CALCIUM IN URINE: CPT

## 2022-01-19 PROCEDURE — 71045 X-RAY EXAM CHEST 1 VIEW: CPT

## 2022-01-19 PROCEDURE — 83930 ASSAY OF BLOOD OSMOLALITY: CPT

## 2022-01-19 PROCEDURE — 86704 HEP B CORE ANTIBODY TOTAL: CPT

## 2022-01-19 PROCEDURE — 86780 TREPONEMA PALLIDUM: CPT

## 2022-01-19 PROCEDURE — 84443 ASSAY THYROID STIM HORMONE: CPT

## 2022-01-19 PROCEDURE — 84560 ASSAY OF URINE/URIC ACID: CPT

## 2022-01-19 PROCEDURE — 84540 ASSAY OF URINE/UREA-N: CPT

## 2022-01-19 PROCEDURE — U0003: CPT

## 2022-01-19 PROCEDURE — U0005: CPT

## 2022-01-19 PROCEDURE — 86803 HEPATITIS C AB TEST: CPT

## 2022-01-19 RX ORDER — METFORMIN HYDROCHLORIDE 850 MG/1
1 TABLET ORAL
Qty: 14 | Refills: 0
Start: 2022-01-19 | End: 2022-01-25

## 2022-01-19 RX ORDER — SITAGLIPTIN 50 MG/1
1 TABLET, FILM COATED ORAL
Qty: 14 | Refills: 0
Start: 2022-01-19 | End: 2022-01-25

## 2022-01-19 RX ORDER — ATORVASTATIN CALCIUM 80 MG/1
1 TABLET, FILM COATED ORAL
Qty: 7 | Refills: 0
Start: 2022-01-19 | End: 2022-01-25

## 2022-01-19 RX ADMIN — Medication 2 UNIT(S): at 07:04

## 2022-01-19 RX ADMIN — Medication 50 MILLIGRAM(S): at 05:31

## 2022-01-19 RX ADMIN — Medication 4: at 12:09

## 2022-01-19 RX ADMIN — GABAPENTIN 300 MILLIGRAM(S): 400 CAPSULE ORAL at 13:09

## 2022-01-19 RX ADMIN — Medication 50 MICROGRAM(S): at 05:31

## 2022-01-19 RX ADMIN — Medication 2 UNIT(S): at 12:09

## 2022-01-19 RX ADMIN — Medication 650 MILLIGRAM(S): at 10:45

## 2022-01-19 RX ADMIN — Medication 650 MILLIGRAM(S): at 09:44

## 2022-01-19 RX ADMIN — GABAPENTIN 300 MILLIGRAM(S): 400 CAPSULE ORAL at 05:32

## 2022-01-19 RX ADMIN — HEPARIN SODIUM 5000 UNIT(S): 5000 INJECTION INTRAVENOUS; SUBCUTANEOUS at 05:32

## 2022-01-19 RX ADMIN — HEPARIN SODIUM 5000 UNIT(S): 5000 INJECTION INTRAVENOUS; SUBCUTANEOUS at 13:08

## 2022-01-19 NOTE — PROGRESS NOTE ADULT - PROVIDER SPECIALTY LIST ADULT
Internal Medicine
Nephrology
Hospitalist
Internal Medicine
MICU
Nephrology
Nephrology
Hospitalist
MICU
Nephrology
Cardiology
Internal Medicine
Nephrology
Nephrology
Rehab Medicine
Rehab Medicine
Internal Medicine
Internal Medicine
Nephrology
Internal Medicine

## 2022-01-19 NOTE — PROGRESS NOTE ADULT - PROBLEM SELECTOR PLAN 8
RESOLVED     Presented w/ altered mental status and repetitive lip smacking. Likely 2/2 electrolyte derangements in the setting of acute renal failure. Pt now at baseline s/p HD.     - Ammonia, syphilis, B12, folate wnl.

## 2022-01-19 NOTE — PROGRESS NOTE ADULT - PROBLEM SELECTOR PLAN 3
Hx DM2. 07/2021 A1c 8.1%. Home meds Latesha Bustillos    - A1c 6.7  -c/w Lantus 3u + lispro 2u TID premeal  - mISS  -continue to monitor blood glucose.

## 2022-01-19 NOTE — PROGRESS NOTE ADULT - REASON FOR ADMISSION
electrolyte abnormalities

## 2022-01-19 NOTE — DISCHARGE NOTE NURSING/CASE MANAGEMENT/SOCIAL WORK - PATIENT PORTAL LINK FT
You can access the FollowMyHealth Patient Portal offered by [facility] by registering at the following website: [email or web address]. By joining [facility]’s FollowMyHealth portal, you will also be able to view your health information using other applications (apps) compatible with our system.

## 2022-01-19 NOTE — PROGRESS NOTE ADULT - SUBJECTIVE AND OBJECTIVE BOX
62 y/o M HTN, DM complicated by neuropathy, EtOH liver cirrhosis, hx IVDU, toxic megacolon c/b pneumatosis s/p colectomy/ileostomy who presents with generalized weakness x 3 days found to have MEG 2/2 ATN vs toxic ingestion vs UTI with hyponatremia, hyperkalemia and hyperphosphatemia, required monitoring in the MICU for AMS and need for urgent hemodialysis. Intermittently required pressors due to hypotension while on dialysis. Mental status improved s/p 2 HD session with no further requirement per renal. Completed course of IV CTX for UTI, now stable for discharge to shelter pending shelter approval    OVERNIGHT EVENTS: naeo    SUBJECTIVE / INTERVAL HPI: Patient seen and examined at bedside. No complaints    **INCOMPLETE NOTE    OVERNIGHT EVENTS:    SUBJECTIVE:  Patient seen and examined at bedside.    Vital Signs Last 12 Hrs  T(F): 98 (01-19-22 @ 05:00), Max: 98 (01-19-22 @ 05:00)  HR: 67 (01-19-22 @ 05:00) (67 - 67)  BP: 137/68 (01-19-22 @ 05:00) (137/68 - 137/68)  BP(mean): --  RR: 16 (01-19-22 @ 05:00) (16 - 16)  SpO2: 98% (01-19-22 @ 05:00) (98% - 98%)  I&O's Summary    18 Jan 2022 07:01  -  19 Jan 2022 07:00  --------------------------------------------------------  IN: 0 mL / OUT: 450 mL / NET: -450 mL        PHYSICAL EXAM:  Constitutional: NAD, comfortable in bed.  HEENT: NC/AT, PERRLA, EOMI, no conjunctival pallor or scleral icterus, MMM  Neck: Supple, no JVD  Respiratory: CTA B/L. No w/r/r.   Cardiovascular: RRR, normal S1 and S2, no m/r/g.   Gastrointestinal: +BS, soft NTND, no guarding or rebound tenderness, no palpable masses   Extremities: wwp; no cyanosis, clubbing or edema.   Vascular: Pulses equal and strong throughout.   Neurological: AAOx3, no CN deficits, strength and sensation intact throughout.   Skin: No gross skin abnormalities or rashes        LABS:                  RADIOLOGY & ADDITIONAL TESTS:    MEDICATIONS  (STANDING):  amLODIPine   Tablet 10 milliGRAM(s) Oral every 24 hours  atorvastatin 10 milliGRAM(s) Oral at bedtime  dextrose 40% Gel 15 Gram(s) Oral once  dextrose 5%. 1000 milliLiter(s) (50 mL/Hr) IV Continuous <Continuous>  dextrose 5%. 1000 milliLiter(s) (100 mL/Hr) IV Continuous <Continuous>  dextrose 50% Injectable 25 Gram(s) IV Push once  dextrose 50% Injectable 12.5 Gram(s) IV Push once  dextrose 50% Injectable 25 Gram(s) IV Push once  gabapentin 300 milliGRAM(s) Oral three times a day  glucagon  Injectable 1 milliGRAM(s) IntraMuscular once  heparin   Injectable 5000 Unit(s) SubCutaneous every 8 hours  hydrALAZINE 50 milliGRAM(s) Oral every 8 hours  insulin glargine Injectable (LANTUS) 3 Unit(s) SubCutaneous at bedtime  insulin lispro (ADMELOG) corrective regimen sliding scale   SubCutaneous Before meals and at bedtime  insulin lispro Injectable (ADMELOG) 2 Unit(s) SubCutaneous three times a day before meals  levothyroxine 50 MICROGram(s) Oral daily  melatonin 5 milliGRAM(s) Oral at bedtime  nicotine -  14 mG/24Hr(s) Patch 1 patch Transdermal daily  QUEtiapine 25 milliGRAM(s) Oral at bedtime  tamsulosin 0.4 milliGRAM(s) Oral at bedtime  traZODone 100 milliGRAM(s) Oral at bedtime    MEDICATIONS  (PRN):  acetaminophen     Tablet .. 650 milliGRAM(s) Oral every 6 hours PRN Temp greater or equal to 38C (100.4F), Mild Pain (1 - 3)  aluminum hydroxide/magnesium hydroxide/simethicone Suspension 30 milliLiter(s) Oral every 4 hours PRN Dyspepsia  ondansetron Injectable 4 milliGRAM(s) IV Push every 8 hours PRN Nausea and/or Vomiting   Hospital Course  60 y/o M HTN, DM complicated by neuropathy, EtOH liver cirrhosis, hx IVDU, toxic megacolon c/b pneumatosis s/p colectomy/ileostomy who presents with generalized weakness x 3 days found to have MEG 2/2 ATN vs toxic ingestion vs UTI with hyponatremia, hyperkalemia and hyperphosphatemia, required monitoring in the MICU for AMS and need for urgent hemodialysis. Intermittently required pressors due to hypotension while on dialysis. Mental status improved s/p 2 HD session with no further requirement per renal. Completed course of IV CTX for UTI, now stable for discharge to shelter pending shelter approval    OVERNIGHT EVENTS: Naeo    SUBJECTIVE:  Patient seen and examined at bedside.    Vital Signs Last 12 Hrs  T(F): 98 (01-19-22 @ 05:00), Max: 98 (01-19-22 @ 05:00)  HR: 67 (01-19-22 @ 05:00) (67 - 67)  BP: 137/68 (01-19-22 @ 05:00) (137/68 - 137/68)  BP(mean): --  RR: 16 (01-19-22 @ 05:00) (16 - 16)  SpO2: 98% (01-19-22 @ 05:00) (98% - 98%)  I&O's Summary    18 Jan 2022 07:01  -  19 Jan 2022 07:00  --------------------------------------------------------  IN: 0 mL / OUT: 450 mL / NET: -450 mL        PHYSICAL EXAM:  Constitutional: NAD, comfortable in bed.  HEENT: NC/AT, PERRLA, EOMI, no conjunctival pallor or scleral icterus, MMM  Neck: Supple, no JVD  Respiratory: CTA B/L. No w/r/r.   Cardiovascular: RRR, normal S1 and S2, no m/r/g.   Gastrointestinal: +BS, soft NTND, no guarding or rebound tenderness, no palpable masses   Extremities: wwp; no cyanosis, clubbing or edema.   Vascular: Pulses equal and strong throughout.   Neurological: AAOx3, no CN deficits, strength and sensation intact throughout.   Skin: No gross skin abnormalities or rashes        LABS:  .                  RADIOLOGY & ADDITIONAL TESTS:    MEDICATIONS  (STANDING):  amLODIPine   Tablet 10 milliGRAM(s) Oral every 24 hours  atorvastatin 10 milliGRAM(s) Oral at bedtime  dextrose 40% Gel 15 Gram(s) Oral once  dextrose 5%. 1000 milliLiter(s) (50 mL/Hr) IV Continuous <Continuous>  dextrose 5%. 1000 milliLiter(s) (100 mL/Hr) IV Continuous <Continuous>  dextrose 50% Injectable 25 Gram(s) IV Push once  dextrose 50% Injectable 12.5 Gram(s) IV Push once  dextrose 50% Injectable 25 Gram(s) IV Push once  gabapentin 300 milliGRAM(s) Oral three times a day  glucagon  Injectable 1 milliGRAM(s) IntraMuscular once  heparin   Injectable 5000 Unit(s) SubCutaneous every 8 hours  hydrALAZINE 50 milliGRAM(s) Oral every 8 hours  insulin glargine Injectable (LANTUS) 3 Unit(s) SubCutaneous at bedtime  insulin lispro (ADMELOG) corrective regimen sliding scale   SubCutaneous Before meals and at bedtime  insulin lispro Injectable (ADMELOG) 2 Unit(s) SubCutaneous three times a day before meals  levothyroxine 50 MICROGram(s) Oral daily  melatonin 5 milliGRAM(s) Oral at bedtime  nicotine -  14 mG/24Hr(s) Patch 1 patch Transdermal daily  QUEtiapine 25 milliGRAM(s) Oral at bedtime  tamsulosin 0.4 milliGRAM(s) Oral at bedtime  traZODone 100 milliGRAM(s) Oral at bedtime    MEDICATIONS  (PRN):  acetaminophen     Tablet .. 650 milliGRAM(s) Oral every 6 hours PRN Temp greater or equal to 38C (100.4F), Mild Pain (1 - 3)  aluminum hydroxide/magnesium hydroxide/simethicone Suspension 30 milliLiter(s) Oral every 4 hours PRN Dyspepsia  ondansetron Injectable 4 milliGRAM(s) IV Push every 8 hours PRN Nausea and/or Vomiting   Hospital Course  60 y/o M HTN, DM complicated by neuropathy, EtOH liver cirrhosis, hx IVDU, toxic megacolon c/b pneumatosis s/p colectomy/ileostomy who presents with generalized weakness x 3 days found to have MEG 2/2 ATN vs toxic ingestion vs UTI with hyponatremia, hyperkalemia and hyperphosphatemia, required monitoring in the MICU for AMS and need for urgent hemodialysis. Intermittently required pressors due to hypotension while on dialysis. Mental status improved s/p 2 HD session with no further requirement per renal. Completed course of IV CTX for UTI, now stable for discharge to shelter pending shelter approval    OVERNIGHT EVENTS: Naeo    SUBJECTIVE:  Patient seen and examined at bedside.    Vital Signs Last 12 Hrs  T(F): 98 (01-19-22 @ 05:00), Max: 98 (01-19-22 @ 05:00)  HR: 67 (01-19-22 @ 05:00) (67 - 67)  BP: 137/68 (01-19-22 @ 05:00) (137/68 - 137/68)  BP(mean): --  RR: 16 (01-19-22 @ 05:00) (16 - 16)  SpO2: 98% (01-19-22 @ 05:00) (98% - 98%)  I&O's Summary    18 Jan 2022 07:01  -  19 Jan 2022 07:00  --------------------------------------------------------  IN: 0 mL / OUT: 450 mL / NET: -450 mL        PHYSICAL EXAM:  Constitutional: NAD, comfortable in bed.  HEENT: NC/AT, PERRLA, EOMI, no conjunctival pallor or scleral icterus, MMM  Neck: Supple, no JVD  Respiratory: CTA B/L. No w/r/r.   Cardiovascular: RRR, normal S1 and S2, no m/r/g.   Gastrointestinal: +BS, soft NTND, no guarding or rebound tenderness, no palpable masses   Extremities: wwp; no cyanosis, clubbing or edema.   Vascular: Pulses equal and strong throughout.   Neurological: AAOx3, no CN deficits, strength and sensation intact throughout.   Skin: No gross skin abnormalities or rashes        .  LABS:                         RADIOLOGY, EKG & ADDITIONAL TESTS: Reviewed.       MEDICATIONS  (STANDING):  amLODIPine   Tablet 10 milliGRAM(s) Oral every 24 hours  atorvastatin 10 milliGRAM(s) Oral at bedtime  dextrose 40% Gel 15 Gram(s) Oral once  dextrose 5%. 1000 milliLiter(s) (50 mL/Hr) IV Continuous <Continuous>  dextrose 5%. 1000 milliLiter(s) (100 mL/Hr) IV Continuous <Continuous>  dextrose 50% Injectable 25 Gram(s) IV Push once  dextrose 50% Injectable 12.5 Gram(s) IV Push once  dextrose 50% Injectable 25 Gram(s) IV Push once  gabapentin 300 milliGRAM(s) Oral three times a day  glucagon  Injectable 1 milliGRAM(s) IntraMuscular once  heparin   Injectable 5000 Unit(s) SubCutaneous every 8 hours  hydrALAZINE 50 milliGRAM(s) Oral every 8 hours  insulin glargine Injectable (LANTUS) 3 Unit(s) SubCutaneous at bedtime  insulin lispro (ADMELOG) corrective regimen sliding scale   SubCutaneous Before meals and at bedtime  insulin lispro Injectable (ADMELOG) 2 Unit(s) SubCutaneous three times a day before meals  levothyroxine 50 MICROGram(s) Oral daily  melatonin 5 milliGRAM(s) Oral at bedtime  nicotine -  14 mG/24Hr(s) Patch 1 patch Transdermal daily  QUEtiapine 25 milliGRAM(s) Oral at bedtime  tamsulosin 0.4 milliGRAM(s) Oral at bedtime  traZODone 100 milliGRAM(s) Oral at bedtime    MEDICATIONS  (PRN):  acetaminophen     Tablet .. 650 milliGRAM(s) Oral every 6 hours PRN Temp greater or equal to 38C (100.4F), Mild Pain (1 - 3)  aluminum hydroxide/magnesium hydroxide/simethicone Suspension 30 milliLiter(s) Oral every 4 hours PRN Dyspepsia  ondansetron Injectable 4 milliGRAM(s) IV Push every 8 hours PRN Nausea and/or Vomiting

## 2022-01-19 NOTE — PROGRESS NOTE ADULT - NUTRITIONAL ASSESSMENT
This patient has been assessed with a concern for Malnutrition and has been determined to have a diagnosis/diagnoses of Moderate protein-calorie malnutrition.    This patient is being managed with:   Diet DASH/TLC-  Sodium & Cholesterol Restricted  Consistent Carbohydrate {Evening Snack} (CSTCHOSN)  60 gm Protein (PRO60G)  Entered: Jan 13 2022  2:35PM    The following pending diet order is being considered for treatment of Moderate protein-calorie malnutrition:  Diet Regular-  Consistent Carbohydrate {Evening Snack} (CSTCHOSN)  Supplement Feeding Modality:  Oral  Ensure Max Cans or Servings Per Day:  2       Frequency:  Three Times a day  Entered: Jan 18 2022  3:07PM  
This patient has been assessed with a concern for Malnutrition and has been determined to have a diagnosis/diagnoses of Moderate protein-calorie malnutrition.    This patient is being managed with:   Diet DASH/TLC-  Sodium & Cholesterol Restricted  Consistent Carbohydrate {Evening Snack} (CSTCHOSN)  60 gm Protein (PRO60G)  Entered: Jan 13 2022  2:35PM

## 2022-01-19 NOTE — PROGRESS NOTE ADULT - PROBLEM SELECTOR PLAN 9
RESOLVED     pt with episodes of bradycardia. Cardiology following and likely in setting of electrolyte derangements 2/2 to renal failure. s/p atropine 0.5 mg x2.  s/p dopamine gtt while receiving HD, now off pressors     - TTE unremarkable  - cards following, appreciate recs  - No episodes of bradycardia since 1/6.

## 2022-01-19 NOTE — PROGRESS NOTE ADULT - PROBLEM SELECTOR PLAN 7
RESOLVED. UA grossly positive, patient c/o dysuria, UCx growing klebsiella sensitive to ceftriaxone  - s/p ceftriaxone 2g (1/7-1/12).

## 2022-01-19 NOTE — PROGRESS NOTE ADULT - PROBLEM SELECTOR PLAN 1
Non oliguric MEG 2/2 ATN vs toxic ingestion, briefly requiring 2 HD sessions.  - Resolved  - Continue lab holiday  - monitor urine output   - hold home cymbalta and baclofen   - avoid nephrotoxic agents  - c/w flomax 0.4 Qd

## 2022-01-19 NOTE — PROGRESS NOTE ADULT - PROBLEM SELECTOR PLAN 2
On hctz and amlodipine at home.     - Medications initially held due to concern for sepsis.   - Pt then resumed amlodipine and started hydralazine 25mg q8 after episodes of hypertension  - Hydralazine increased to 50mg q8  - c/w hydralazine 50mg q8  - C/w amlodipine 10 qd  - Consider restarting home dose HCTZ 25 mg QD  - Hold lisinopril i/s/o MEG.

## 2022-01-24 ENCOUNTER — INPATIENT (INPATIENT)
Facility: HOSPITAL | Age: 62
LOS: 6 days | Discharge: HOME | DRG: 683 | End: 2022-01-31
Attending: INTERNAL MEDICINE | Admitting: INTERNAL MEDICINE
Payer: MEDICAID

## 2022-01-24 VITALS
RESPIRATION RATE: 17 BRPM | SYSTOLIC BLOOD PRESSURE: 142 MMHG | TEMPERATURE: 98 F | HEART RATE: 61 BPM | WEIGHT: 160.06 LBS | DIASTOLIC BLOOD PRESSURE: 68 MMHG | OXYGEN SATURATION: 98 % | HEIGHT: 66 IN

## 2022-01-24 DIAGNOSIS — Z90.49 ACQUIRED ABSENCE OF OTHER SPECIFIED PARTS OF DIGESTIVE TRACT: Chronic | ICD-10-CM

## 2022-01-24 DIAGNOSIS — Z98.890 OTHER SPECIFIED POSTPROCEDURAL STATES: Chronic | ICD-10-CM

## 2022-01-24 LAB
ALBUMIN SERPL ELPH-MCNC: 3.7 G/DL — SIGNIFICANT CHANGE UP (ref 3.4–5)
ALP SERPL-CCNC: 114 U/L — SIGNIFICANT CHANGE UP (ref 40–120)
ALT FLD-CCNC: 17 U/L — SIGNIFICANT CHANGE UP (ref 12–42)
AMMONIA BLD-MCNC: 41 UMOL/L — HIGH (ref 11–32)
AMPHET UR-MCNC: NEGATIVE — SIGNIFICANT CHANGE UP
ANION GAP SERPL CALC-SCNC: 14 MMOL/L — SIGNIFICANT CHANGE UP (ref 9–16)
APPEARANCE UR: CLEAR — SIGNIFICANT CHANGE UP
AST SERPL-CCNC: 18 U/L — SIGNIFICANT CHANGE UP (ref 15–37)
BACTERIA # UR AUTO: PRESENT /HPF
BARBITURATES UR SCN-MCNC: NEGATIVE — SIGNIFICANT CHANGE UP
BASOPHILS # BLD AUTO: 0.05 K/UL — SIGNIFICANT CHANGE UP (ref 0–0.2)
BASOPHILS NFR BLD AUTO: 0.5 % — SIGNIFICANT CHANGE UP (ref 0–2)
BENZODIAZ UR-MCNC: NEGATIVE — SIGNIFICANT CHANGE UP
BILIRUB SERPL-MCNC: 0.1 MG/DL — LOW (ref 0.2–1.2)
BILIRUB UR-MCNC: NEGATIVE — SIGNIFICANT CHANGE UP
BUN SERPL-MCNC: 87 MG/DL — HIGH (ref 7–23)
CALCIUM SERPL-MCNC: 8.2 MG/DL — LOW (ref 8.5–10.5)
CHLORIDE SERPL-SCNC: 92 MMOL/L — LOW (ref 96–108)
CO2 SERPL-SCNC: 8 MMOL/L — CRITICAL LOW (ref 22–31)
COCAINE METAB.OTHER UR-MCNC: NEGATIVE — SIGNIFICANT CHANGE UP
COLOR SPEC: YELLOW — SIGNIFICANT CHANGE UP
CREAT SERPL-MCNC: 4.39 MG/DL — HIGH (ref 0.5–1.3)
DIFF PNL FLD: ABNORMAL
EOSINOPHIL # BLD AUTO: 0.06 K/UL — SIGNIFICANT CHANGE UP (ref 0–0.5)
EOSINOPHIL NFR BLD AUTO: 0.6 % — SIGNIFICANT CHANGE UP (ref 0–6)
EPI CELLS # UR: ABNORMAL /HPF (ref 0–5)
ETHANOL SERPL-MCNC: <3 MG/DL — SIGNIFICANT CHANGE UP
GLUCOSE BLDC GLUCOMTR-MCNC: 165 MG/DL — HIGH (ref 70–99)
GLUCOSE BLDC GLUCOMTR-MCNC: 175 MG/DL — HIGH (ref 70–99)
GLUCOSE BLDC GLUCOMTR-MCNC: 358 MG/DL — HIGH (ref 70–99)
GLUCOSE BLDC GLUCOMTR-MCNC: 369 MG/DL — HIGH (ref 70–99)
GLUCOSE SERPL-MCNC: 354 MG/DL — HIGH (ref 70–99)
GLUCOSE UR QL: NEGATIVE — SIGNIFICANT CHANGE UP
HCT VFR BLD CALC: 28.7 % — LOW (ref 39–50)
HGB BLD-MCNC: 9.6 G/DL — LOW (ref 13–17)
IMM GRANULOCYTES NFR BLD AUTO: 0.8 % — SIGNIFICANT CHANGE UP (ref 0–1.5)
KETONES UR-MCNC: NEGATIVE — SIGNIFICANT CHANGE UP
LACTATE SERPL-SCNC: 1.1 MMOL/L — SIGNIFICANT CHANGE UP (ref 0.4–2)
LEUKOCYTE ESTERASE UR-ACNC: NEGATIVE — SIGNIFICANT CHANGE UP
LIDOCAIN IGE QN: 560 U/L — HIGH (ref 73–393)
LYMPHOCYTES # BLD AUTO: 0.82 K/UL — LOW (ref 1–3.3)
LYMPHOCYTES # BLD AUTO: 7.9 % — LOW (ref 13–44)
MAGNESIUM SERPL-MCNC: 1.9 MG/DL — SIGNIFICANT CHANGE UP (ref 1.6–2.6)
MCHC RBC-ENTMCNC: 30.6 PG — SIGNIFICANT CHANGE UP (ref 27–34)
MCHC RBC-ENTMCNC: 33.4 GM/DL — SIGNIFICANT CHANGE UP (ref 32–36)
MCV RBC AUTO: 91.4 FL — SIGNIFICANT CHANGE UP (ref 80–100)
METHADONE UR-MCNC: NEGATIVE — SIGNIFICANT CHANGE UP
MONOCYTES # BLD AUTO: 0.65 K/UL — SIGNIFICANT CHANGE UP (ref 0–0.9)
MONOCYTES NFR BLD AUTO: 6.3 % — SIGNIFICANT CHANGE UP (ref 2–14)
NEUTROPHILS # BLD AUTO: 8.67 K/UL — HIGH (ref 1.8–7.4)
NEUTROPHILS NFR BLD AUTO: 83.9 % — HIGH (ref 43–77)
NITRITE UR-MCNC: NEGATIVE — SIGNIFICANT CHANGE UP
NRBC # BLD: 0 /100 WBCS — SIGNIFICANT CHANGE UP (ref 0–0)
OPIATES UR-MCNC: NEGATIVE — SIGNIFICANT CHANGE UP
PCO2 BLDA: 22 MMHG — LOW (ref 35–48)
PCP SPEC-MCNC: SIGNIFICANT CHANGE UP
PCP UR-MCNC: NEGATIVE — SIGNIFICANT CHANGE UP
PH BLDA: 7.12 — CRITICAL LOW (ref 7.35–7.45)
PH UR: 7 — SIGNIFICANT CHANGE UP (ref 5–8)
PLATELET # BLD AUTO: 253 K/UL — SIGNIFICANT CHANGE UP (ref 150–400)
PO2 BLDA: 128 MMHG — HIGH (ref 83–108)
POTASSIUM SERPL-MCNC: 7.6 MMOL/L — CRITICAL HIGH (ref 3.5–5.3)
POTASSIUM SERPL-SCNC: 7.6 MMOL/L — CRITICAL HIGH (ref 3.5–5.3)
PROT SERPL-MCNC: 8.3 G/DL — HIGH (ref 6.4–8.2)
PROT UR-MCNC: NEGATIVE MG/DL — SIGNIFICANT CHANGE UP
RBC # BLD: 3.14 M/UL — LOW (ref 4.2–5.8)
RBC # FLD: 15.2 % — HIGH (ref 10.3–14.5)
RBC CASTS # UR COMP ASSIST: < 5 /HPF — SIGNIFICANT CHANGE UP
SAO2 % BLDA: 100 % — HIGH (ref 94–98)
SARS-COV-2 RNA SPEC QL NAA+PROBE: SIGNIFICANT CHANGE UP
SODIUM SERPL-SCNC: 114 MMOL/L — CRITICAL LOW (ref 132–145)
SP GR SPEC: 1.01 — SIGNIFICANT CHANGE UP (ref 1–1.03)
THC UR QL: NEGATIVE — SIGNIFICANT CHANGE UP
UROBILINOGEN FLD QL: 0.2 E.U./DL — SIGNIFICANT CHANGE UP
WBC # BLD: 10.33 K/UL — SIGNIFICANT CHANGE UP (ref 3.8–10.5)
WBC # FLD AUTO: 10.33 K/UL — SIGNIFICANT CHANGE UP (ref 3.8–10.5)
WBC UR QL: < 5 /HPF — SIGNIFICANT CHANGE UP

## 2022-01-24 PROCEDURE — 93010 ELECTROCARDIOGRAM REPORT: CPT

## 2022-01-24 PROCEDURE — 71045 X-RAY EXAM CHEST 1 VIEW: CPT | Mod: 26

## 2022-01-24 PROCEDURE — 70450 CT HEAD/BRAIN W/O DYE: CPT | Mod: 26

## 2022-01-24 PROCEDURE — 99291 CRITICAL CARE FIRST HOUR: CPT | Mod: 25

## 2022-01-24 PROCEDURE — 99291 CRITICAL CARE FIRST HOUR: CPT

## 2022-01-24 RX ORDER — ALBUTEROL 90 UG/1
2.5 AEROSOL, METERED ORAL ONCE
Refills: 0 | Status: COMPLETED | OUTPATIENT
Start: 2022-01-24 | End: 2022-01-24

## 2022-01-24 RX ORDER — SODIUM CHLORIDE 9 MG/ML
1000 INJECTION INTRAMUSCULAR; INTRAVENOUS; SUBCUTANEOUS ONCE
Refills: 0 | Status: COMPLETED | OUTPATIENT
Start: 2022-01-24 | End: 2022-01-24

## 2022-01-24 RX ORDER — INSULIN HUMAN 100 [IU]/ML
10 INJECTION, SOLUTION SUBCUTANEOUS ONCE
Refills: 0 | Status: COMPLETED | OUTPATIENT
Start: 2022-01-24 | End: 2022-01-24

## 2022-01-24 RX ORDER — INSULIN HUMAN 100 [IU]/ML
6 INJECTION, SOLUTION SUBCUTANEOUS ONCE
Refills: 0 | Status: COMPLETED | OUTPATIENT
Start: 2022-01-24 | End: 2022-01-24

## 2022-01-24 RX ORDER — SODIUM BICARBONATE 1 MEQ/ML
100 SYRINGE (ML) INTRAVENOUS ONCE
Refills: 0 | Status: COMPLETED | OUTPATIENT
Start: 2022-01-24 | End: 2022-01-24

## 2022-01-24 RX ORDER — DEXTROSE 50 % IN WATER 50 %
25 SYRINGE (ML) INTRAVENOUS ONCE
Refills: 0 | Status: COMPLETED | OUTPATIENT
Start: 2022-01-24 | End: 2022-01-24

## 2022-01-24 RX ORDER — SODIUM ZIRCONIUM CYCLOSILICATE 10 G/10G
10 POWDER, FOR SUSPENSION ORAL ONCE
Refills: 0 | Status: COMPLETED | OUTPATIENT
Start: 2022-01-24 | End: 2022-01-24

## 2022-01-24 RX ORDER — CALCIUM GLUCONATE 100 MG/ML
2 VIAL (ML) INTRAVENOUS ONCE
Refills: 0 | Status: COMPLETED | OUTPATIENT
Start: 2022-01-24 | End: 2022-01-24

## 2022-01-24 RX ADMIN — Medication 25 MILLILITER(S): at 18:29

## 2022-01-24 RX ADMIN — Medication 100 MILLIEQUIVALENT(S): at 18:29

## 2022-01-24 RX ADMIN — Medication 200 GRAM(S): at 18:27

## 2022-01-24 RX ADMIN — ALBUTEROL 2.5 MILLIGRAM(S): 90 AEROSOL, METERED ORAL at 18:28

## 2022-01-24 RX ADMIN — SODIUM CHLORIDE 1000 MILLILITER(S): 9 INJECTION INTRAMUSCULAR; INTRAVENOUS; SUBCUTANEOUS at 17:30

## 2022-01-24 RX ADMIN — INSULIN HUMAN 10 UNIT(S): 100 INJECTION, SOLUTION SUBCUTANEOUS at 18:28

## 2022-01-24 RX ADMIN — INSULIN HUMAN 6 UNIT(S): 100 INJECTION, SOLUTION SUBCUTANEOUS at 17:30

## 2022-01-24 RX ADMIN — SODIUM ZIRCONIUM CYCLOSILICATE 10 GRAM(S): 10 POWDER, FOR SUSPENSION ORAL at 18:27

## 2022-01-24 NOTE — H&P ADULT - ASSESSMENT
62M with HTN, DM c/b neuropathy, EtOH liver cirrhosis, toxic megacolon c/b pneumatosis s/p colectomy/ileostomy, and multiple admissions for ARF w/ hyperkalemia (most recently 1/6-1/19/2022, requiring HD), initially presenting to [facility] for generalized weakness since discharge, found to have acute renal failure with hyperkalemia and EKG changes and admitted to the ICU for emergent dialysis.       NEUROLOGIC  -no acute issues    CARDIOLOGY  #Hypertension  -home meds (from discharge 1/19): hydrALAZINE 50mg q8hrs, norvasc 10mg qd  -/68 on admission  Plan:    -c/w norvasc 10mg qd    -    #HLD  -home meds (from discharge 1/19): atorvastatin 10mg  Plan:    -c/w atorvastatin 10mg qhs during admission    RESPIRATORY  -no acute issues    GASTROINTESTINAL  #ostomy  -with ostomy due to hx of toxic megacolon. output draining brown stool.    -home meds (from discharge 1/19): sitagliptin (?50mg bid), metformin 500mg bid. on gabapentin 300mg TID for peripheral neuropathy    -home meds (from discharge 1/19): synthroid 50mcg qd      · ferrous sulfate 325 mg (65 mg elemental iron) oral tablet: 1  orally once a day  · DULoxetine 30 mg oral delayed release capsule: 1  orally once a day (at bedtime)  · traZODone 100 mg oral tablet: 1 tab(s) orally once a day (at bedtime)   · tamsulosin 0.4 mg oral capsule: 1 cap(s) orally once a day (at bedtime)      Regarding last admission, found to have ARF 2/2 ATN. Had been on ICU and telemetry from altered mental status 2/2 renal failure and intermittent hypotension 2/2 HD. HD catheter had been removed when pt recovered urinary output, thought to have recovered renal function sufficiently to no longer require HD as outpatient. Course c/b urinary tract infection treated with ceftriaxone. Was discharged to shelter.   Of note, pt also admitted 07/27/2021-08/1/2021 for MEG c/b hyperkalemia, responsive to medical therapy and not requiring hemodialysis.      At [facility],  Vitals: T 98.2, HR 61, /68, RR 17, O2sat 98% on room air  Labs: Hb 9.6 | Na 114, Cl 92, K 7.6, HCO3 8, BUN 87, Cr 4.39, glucose 354  Imaging:    EKG: left axis deviation, sinus bradycardia at HR 51, peaked T waves    CXR: no acute infiltrates or consolidations  ED Course: given D5+insulin, lokelma, bicarb, albuterol, calcium gluconate. Transferred to [facility] ICU for emergent dialysis.    Upon arrival to [facility] ICU, renal consulted for emergent dialysis. R IJ HD catheter placed and intermittent HD initiated overnight. 62M with HTN, DM c/b neuropathy, EtOH liver cirrhosis, toxic megacolon c/b pneumatosis s/p colectomy/ileostomy, and multiple admissions for ARF w/ hyperkalemia (most recently 1/6-1/19/2022, requiring HD), initially presenting to [facility] for generalized weakness since discharge, found to have acute renal failure with hyperkalemia and EKG changes and admitted to the ICU for emergent dialysis.       NEUROLOGIC  -no acute issues    CARDIOLOGY  #Hypertension  -home meds (from discharge 1/19): hydrALAZINE 50mg q8hrs, norvasc 10mg qd  -/68 on admission  Plan:    -c/w norvasc 10mg qd    -    #HLD  -home meds (from discharge 1/19): atorvastatin 10mg  Plan:    -c/w atorvastatin 10mg qhs during admission    RESPIRATORY  -no acute issues    GASTROINTESTINAL  #ostomy  -with ostomy due to hx of toxic megacolon. output draining brown stool.    ENDOCRINE  #DM  -home meds (from discharge 1/19): Janumet 50 mg-500 mg BID. on gabapentin 300mg TID for peripheral neuropathy  Plan:    -holding oral diabetes medications    -monitor FSG and manage hyperglycemia with moderate ISS. based on insulin requirements will consider basal-bolus regimen    -gabapentin requires dose adjustment for dialysis - will give 100mg after HD sessions    #Hypothyroidism  -home meds (from discharge 1/19): synthroid 50mcg qd  Plan:    -c/w home synthroid 50mcg qd      · ferrous sulfate 325 mg (65 mg elemental iron) oral tablet: 1  orally once a day  · DULoxetine 30 mg oral delayed release capsule: 1  orally once a day (at bedtime)  · traZODone 100 mg oral tablet: 1 tab(s) orally once a day (at bedtime)   · tamsulosin 0.4 mg oral capsule: 1 cap(s) orally once a day (at bedtime)      Regarding last admission, found to have ARF 2/2 ATN. Had been on ICU and telemetry from altered mental status 2/2 renal failure and intermittent hypotension 2/2 HD. HD catheter had been removed when pt recovered urinary output, thought to have recovered renal function sufficiently to no longer require HD as outpatient. Course c/b urinary tract infection treated with ceftriaxone. Was discharged to shelter.   Of note, pt also admitted 07/27/2021-08/1/2021 for MEG c/b hyperkalemia, responsive to medical therapy and not requiring hemodialysis.      At [facility],  Vitals: T 98.2, HR 61, /68, RR 17, O2sat 98% on room air  Labs: Hb 9.6 | Na 114, Cl 92, K 7.6, HCO3 8, BUN 87, Cr 4.39, glucose 354  Imaging:    EKG: left axis deviation, sinus bradycardia at HR 51, peaked T waves    CXR: no acute infiltrates or consolidations  ED Course: given D5+insulin, lokelma, bicarb, albuterol, calcium gluconate. Transferred to [facility] ICU for emergent dialysis.    Upon arrival to [facility] ICU, renal consulted for emergent dialysis. R IJ HD catheter placed and intermittent HD initiated overnight. 62M with HTN, DM c/b neuropathy, EtOH liver cirrhosis, toxic megacolon c/b pneumatosis s/p colectomy/ileostomy, and multiple admissions for ARF w/ hyperkalemia (most recently 1/6-1/19/2022, requiring HD), initially presenting to [facility] for generalized weakness since discharge, found to have acute renal failure with hyperkalemia and EKG changes and admitted to the ICU for emergent dialysis.       NEUROLOGIC  -no acute issues      CARDIOLOGY  #Hypertension  -home meds (from discharge 1/19): hydrALAZINE 50mg q8hrs, norvasc 10mg qd  -/68 on admission  Plan:    -holding home antihypertensives in the setting of normotensive pressures and getting HD    -monitor BP and restart home antihypertensives as tolerated by blood pressure    #HLD  -home meds (from discharge 1/19): atorvastatin 10mg  Plan:    -c/w atorvastatin 10mg qhs during admission      RESPIRATORY  -no acute issues      GASTROINTESTINAL  #ostomy  -with ostomy due to hx of toxic megacolon. output draining brown stool.      ENDOCRINE  #DM  -home meds (from discharge 1/19): Janumet 50 mg-500 mg BID. on gabapentin 300mg TID for peripheral neuropathy  Plan:    -holding oral diabetes medications    -monitor FSG and manage hyperglycemia with moderate ISS. based on insulin requirements will consider basal-bolus regimen    -gabapentin requires dose adjustment for dialysis - will give 100mg after HD sessions    #Hypothyroidism  -home meds (from discharge 1/19): synthroid 50mcg qd  Plan:    -c/w home synthroid 50mcg qd      RENAL & GENITOURINARY  #Acute renal failure  -multiple admissions for acute renal failure with hyperkalemia. 1/6-1/19/2022 admission to ICU for altered mental status 2/2 renal failure requiring HD. HD catheter had been removed when pt recovered urinary output, thought to have recovered renal function sufficiently to no longer require HD as outpatient. Also with admission 07/27/2021-08/1/2021 for MEG c/b hyperkalemia, responsive to medical therapy and not requiring hemodialysis.  -on ferrous sulfate tabs at home  -On admission, K 7.6, HCO3 8, BUN 87, Cr 4.39. EKG with peaked T waves. no evidence of fluid overload on exam.  -renal consulted upon arrival to ICU. R IJ HD cath placed at bedside and 1st episode of HD overnight.  Plan:    -avoid nephrotoxic medications and renally dose medications    -appreciate renal recommendations    -monitor renal function with BMP    -strict I/O    #BPH  -home meds (from discharge 1/19): tamsulosin 0.4 mg qhs  Plan:    -c/w tamsulosin 0.4mg qhs      MISC  F: encourage PO intake  E: on HD, caution with repletion  N: renal diet, consistent carb, dash  Ppx: heparin subcut    Code Status: full code    Dispo: MICU

## 2022-01-24 NOTE — ED PROVIDER NOTE - PSYCHIATRIC, MLM
[facility] [address] [address]  Phone: [phone] Fax: [phone]      Physical Therapy Treatment Note/ Progress Report:     Date:  2021    Patient Name:  Criselda Triana    :  1997  MRN: [id number]  Restrictions/Precautions:    Medical/Treatment Diagnosis Information:  · Diagnosis: s/p right Efra Bennett  · Treatment Diagnosis: A82.805  Insurance/Certification information:  PT Insurance Information: [employer]  Physician Information:  Referring Practitioner: John Benoit MD  Plan of care signed (Y/N):     Date of Patient follow up with Physician:      Progress Report: []  Yes  [x]  No     Date Range for reporting period:  Beginning:  3/24/2021  Ending:      Progress report due (10 Rx/or 30 days whichever is less):      Recertification due (POC duration/ or 90 days whichever is less): 2021     Visit # Insurance Allowable Auth Needed   16  []Yes    []No     Pain level:  0/10     SUBJECTIVE:  Pt states he is still feeling good. Pt to do another week or so of PT in [facility] before his appointments start at [facility]. OBJECTIVE: passive shoulder flexion a little tight at end-range. No pain associated. Distal tricep a little tight upon palpation today.       Observation:   Test measurements:      2021  ROM Left Right   Shoulder Flex WFL Slightly limited   Shoulder Abd WFL Tight at end range   Shoulder ER [facility]   Shoulder IR [facility]   Elbow flex    Elbow ext [facility] 1       2021  ROM Left Right   Shoulder Flex [facility] WFL; tight   Shoulder Abd [facility] WFL; tight    Shoulder ER [facility]   Shoulder IR [facility] WFL   Elbow flex [facility] WFL   [facility]   Strength  Left Right   Shoulder Flex 35.1 39.1   Shoulder Scap 25.5 38.5   Shoulder ER 28.1 34.1   Shoulder IR 27.1 29.2   Elbow flex 28.0 30.6   Elbow ext 31.9 32.6    with elbow bent 116 105     Quick DASH: 0%      RESTRICTIONS/PRECAUTIONS: follow Alert and oriented to person, place, time/situation. normal mood and affect. flexibility, endurance, ROM  for improvements in scapular, scapulothoracic and UE control with self care, reaching, carrying, lifting, house/yardwork, driving/computer work. [x] (39550) Provided verbal/tactile cueing for activities related to improving balance, coordination, kinesthetic sense, posture, motor skill, proprioception  to assist with  scapular, scapulothoracic and UE control with self care, reaching, carrying, lifting, house/yardwork, driving/computer work. Therapeutic Activities:    [] (62967 or 27071) Provided verbal/tactile cueing for activities related to improving balance, coordination, kinesthetic sense, posture, motor skill, proprioception and motor activation to allow for proper function of scapular, scapulothoracic and UE control with self care, carrying, lifting, driving/computer work.      Home Exercise Program:    [x] (88759) Reviewed/Progressed HEP activities related to strengthening, flexibility, endurance, ROM of scapular, scapulothoracic and UE control with self care, reaching, carrying, lifting, house/yardwork, driving/computer work  [] (56729) Reviewed/Progressed HEP activities related to improving balance, coordination, kinesthetic sense, posture, motor skill, proprioception of scapular, scapulothoracic and UE control with self care, reaching, carrying, lifting, house/yardwork, driving/computer work      Manual Treatments:  PROM / STM / Oscillations-Mobs:  G-I, II, III, IV (PA's, Inf., Post.)  [x] (60260) Provided manual therapy to mobilize soft tissue/joints of cervical/CT, scapular GHJ and UE for the purpose of modulating pain, promoting relaxation,  increasing ROM, reducing/eliminating soft tissue swelling/inflammation/restriction, improving soft tissue extensibility and allowing for proper ROM for normal function with self care, reaching, carrying, lifting, house/yardwork, driving/computer work    Modalities:  none    Charges:  Timed Code Treatment M inutes: 51   Total Treatment Minutes: 51       [] EVAL (LOW) 28264 (typically 20 minutes face-to-face)  [] EVAL (MOD) 56133 (typically 30 minutes face-to-face)  [] EVAL (HIGH) 48903 (typically 45 minutes face-to-face)  [] RE-EVAL     [x] FZ(07721) x  2   [] DRY NEEDLE 1 OR 2 MUSCLES  [x] NMR (23972) x 1    [] DRY NEEDLE 3+ MUSCLES  [] Manual (46297) x      [] TA (81971) x     [] Mech Traction (12428)  [] ES(attended) (01692)     [] ES (un) (80563):   [] VASO (96373)  [] Other:      GOALS:  Patient stated goal: return to pain free throwing program  [] Progressing: [] Met: [] Not Met: [] Adjusted    Therapist goals for Patient:   Short Term Goals: To be achieved in: 2 weeks  1. Independent in HEP and progression per patient tolerance, in order to prevent re-injury. [] Progressing: [] Met: [] Not Met: [] Adjusted  2. Patient will have a decrease in pain to facilitate improvement in movement, function, and ADLs as indicated by Functional Deficits. [] Progressing: [] Met: [] Not Met: [] Adjusted    Long Term Goals: To be achieved in: 4-6 weeks  1. Disability index score of 5% or less for the Quick DASH to assist with reaching prior level of function. [] Progressing: [] Met: [] Not Met: [] Adjusted  2. Patient will demonstrate increased AROM to [facility] to allow for proper joint functioning as indicated by Functional Deficits. [] Progressing: [] Met: [] Not Met: [] Adjusted  3. Patient will demonstrate an increase in NM recruitment/activation and overall GH and scapular strength to within n5lbs HHD or WNL for proper functional mobility as indicated by patients Functional Deficits. [] Progressing: [] Met: [] Not Met: [] Adjusted  4. Patient will return to throwing, weight training activities without increased symptoms or restriction. [] Progressing: [] Met: [] Not Met: [] Adjusted      ASSESSMENT:  Good tolerance to progression of program today. Progressing baseline strength as tolerated and form allows.  Cues needed for scap position w/ RTC strength. No complaints of soreness at conclusion. Tolerated progression of overhead stability and closed chain strengthening without symptoms or tightness. Return to Play: (if applicable)   [x]  Stage 1: Intro to Strength   []  Stage 2: Dynamic Strength and Intro to Plyometrics   []  Stage 3: Advanced Plyometrics and Intro to Throwing   []  Stage 4: Sport specific Training/Return to Sport     []  Ready to Return to Play, [employer]   []  Not Ready for Return to Sports   Comments:      Treatment/Activity Tolerance:  [x] Patient tolerated treatment well [] Patient limited by fatique  [] Patient limited by pain  [] Patient limited by other medical complications  [] Other:     Overall Progression Towards Functional goals/ Treatment Progress Update:  [] Patient is progressing as expected towards functional goals listed. [] Progression is slowed due to complexities/Impairments listed. [] Progression has been slowed due to co-morbidities. [x] Plan just implemented, too soon to assess goals progression <30days   [] Goals require adjustment due to lack of progress  [] Patient is not progressing as expected and requires additional follow up with physician  [] Other    Prognosis for POC: [x] Good [] Fair  [] Poor    Patient requires continued skilled intervention: [x] Yes  [] No      PLAN: See eval   [x] Continue per plan of care [] Alter current plan (see comments)  [] Plan of care initiated [] Hold pending MD visit [] Discharge    Electronically signed by: Felix Willingham PTA     Note: If patient does not return for scheduled/recommended follow up visits, this note will serve as a discharge from care along with the most recent update on progress.

## 2022-01-24 NOTE — ED PROVIDER NOTE - CRITICAL CARE ATTENDING CONTRIBUTION TO CARE
The patient was seen immediately upon arrival due to a high probability of imminent or life-threatening deterioration secondary to renal failure, hyperkalemia, requiring cardiac monitoring, which required my direct attention, intervention, and personal management at the bedside. I have personally provided critical care time exclusive of time spent on separately billable procedures. Time includes review of laboratory data, radiology results, discussion with consultants, discussion with the patient's family, and monitoring for potential decompensation.

## 2022-01-24 NOTE — ED PROVIDER NOTE - CARE PLAN
1 Principal Discharge DX:	Acute renal failure  Secondary Diagnosis:	Hyperkalemia  Secondary Diagnosis:	Hyponatremia

## 2022-01-24 NOTE — H&P ADULT - NSHPSOCIALHISTORY_GEN_ALL_CORE
Living situation: lives in shelter  Occupation:    Tobacco use:   EtOH use:  Illicit drug use:    Sexual History:

## 2022-01-24 NOTE — ED ADULT NURSE NOTE - CHIEF COMPLAINT QUOTE
no
SKYLER from [facility] for generalized malaise and s/s of failure to thrive. pt currently with flat affect. denies any c/o pain. admits to being recently discharged from [facility]. poa colostomy

## 2022-01-24 NOTE — H&P ADULT - ATTENDING COMMENTS
Recurrent MEG on CKD, now 3rd episode of presenting with weakness related to significant electrolyte derangements and acute on chronic renal failure requiring emergent HD, non oliguric. After each episode he has renal recovery and is able to be discharged off HD. Unclear etiology for recurrent renal failure, but presents again with MEG, hyperkalemia with associated EKG changes, metabolic acidosis, and hyponatremia. No AMS and volume status stable. No obvious signs/sx of infection. Will r/o obstruction. Emergent HD catheter placement to initiate HD and F/U renal recs. BP low normal for patient, in setting of initiating dialysis will hold anti hypertensives for now. Otherwise hemodynamically stable, likely step down in AM.

## 2022-01-24 NOTE — CONSULT NOTE ADULT - SUBJECTIVE AND OBJECTIVE BOX
Patient is a 62y old  Male who presents with a chief complaint of hyperkalemia; metabolic acidosis    HPI:62yo M hx of ?etoh cirrhosis, HTN, DM2 with neuropathy, hx of toxic megacolon c/b pneumatosis s/p colectomy and ileostomy, admission 1 months ago for hyperkalemia and hyponatremia, presents with generalized weakness malaise and lightheadedness; of note patient was here around one month ago with the same presentation requiring emergent HD x 2 sessions;  patient states that he has not been taking any new medications in fact the only medication he is currently taking is his gabapentin he has been feeling very fatigued and weakness even suffered a fall a few weeks ago; denies any changes in his urinary habits ; has been having a lot of output from his ileostomy bag; upon arrival to the ED patient was noted to be in ARF w/ a K of 7.6 bicarb of 8 Na 114 and had bradycardia w/ peaked T waves ; nephrology consulted for emergent hemodialysis    No NSAID use. Nephrology consulted for elevated creatinine.     PAST MEDICAL & SURGICAL HISTORY:  DM (diabetes mellitus)    HTN (hypertension)    HLD (hyperlipidemia)    ETOH abuse    Anemia    History of colectomy    History of ileostomy          Allergies:  No Known Allergies      Home Medications:       Hospital Medications:   MEDICATIONS  (STANDING):      SOCIAL HISTORY:  Denies ETOh, Smoking,     Family History:  FAMILY HISTORY:        VITALS:  T(F): 95.5 (22 @ 21:25), Max: 98.9 (22 @ 19:09)  HR: 62 (22 @ 21:25)  BP: 130/58 (22 @ 21:25)  RR: 13 (22 @ 21:25)  SpO2: 100% (22 @ 21:25)  Wt(kg): --    Height (cm): 167.6 ( @ 16:19)  Weight (kg): 72.6 ( @ 16:19)  BMI (kg/m2): 25.8 (:19)  BSA (m2): 1.82 (:19)  CAPILLARY BLOOD GLUCOSE      POCT Blood Glucose.: 175 mg/dL (2022 21:44)  POCT Blood Glucose.: 165 mg/dL (2022 20:01)  POCT Blood Glucose.: 369 mg/dL (2022 17:28)  POCT Blood Glucose.: 358 mg/dL (2022 16:54)      Review of Systems:  all other ROS negative     PHYSICAL EXAM:  GENERAL: Alert, awake, oriented x3 though appears frail   CHEST/LUNG: Bilateral clear breath sounds no rales, rhonchi or wheezing  HEART: Regular rate and rhythm, no murmur, no gallops, no rub   ABDOMEN: Soft, nontender, non distended ileostomy bag in place   EXTREMITIES: no pedal edema  SKIN: No rash or skin lesion   ACCESS: LUE AVF w/bruit and thrill     LABS:      114<LL>  |  92<L>  |  87<H>  ----------------------------<  354<H>  7.6<HH>   |  8<LL>  |  4.39<H>    Ca    8.2<L>      2022 17:15  Mg     1.9         TPro  8.3<H>  /  Alb  3.7  /  TBili  0.1<L>  /  DBili      /  AST  18  /  ALT  17  /  AlkPhos  114      Creatinine Trend: 4.39 <--                        9.6    10.33 )-----------( 253      ( 2022 17:15 )             28.7     Urine Studies:  Urinalysis Basic - ( 2022 19:08 )    Color: Yellow / Appearance: Clear / S.010 / pH:   Gluc:  / Ketone: NEGATIVE  / Bili: NEGATIVE / Urobili: 0.2 E.U./dL   Blood:  / Protein: NEGATIVE mg/dL / Nitrite: NEGATIVE   Leuk Esterase: NEGATIVE / RBC: < 5 /HPF / WBC < 5 /HPF   Sq Epi:  / Non Sq Epi: 5-10 /HPF / Bacteria: Present /HPF             Patient is a 62y old  Male who presents with a chief complaint of hyperkalemia; metabolic acidosis    HPI:60yo M hx of ?etoh cirrhosis, HTN, DM2 with neuropathy, hx of toxic megacolon c/b pneumatosis s/p colectomy and ileostomy, admission 1 months ago for hyperkalemia and hyponatremia, presents with generalized weakness malaise and lightheadedness; of note patient was here around one month ago with the same presentation requiring emergent HD x 2 sessions;  patient states that he has not been taking any new medications in fact the only medication he is currently taking is his gabapentin he has been feeling very fatigued and weakness even suffered a fall a few weeks ago; denies any changes in his urinary habits ; has been having a lot of output from his ileostomy bag; upon arrival to the ED patient was noted to be in ARF w/ a K of 7.6 bicarb of 8 Na 114 and had bradycardia w/ peaked T waves ; nephrology consulted for emergent hemodialysis       PAST MEDICAL & SURGICAL HISTORY:  DM (diabetes mellitus)    HTN (hypertension)    HLD (hyperlipidemia)    ETOH abuse    Anemia    History of colectomy    History of ileostomy          Allergies:  No Known Allergies      Home Medications:       Hospital Medications:   MEDICATIONS  (STANDING):      SOCIAL HISTORY:  Denies ETOh, Smoking,     Family History:  FAMILY HISTORY:        VITALS:  T(F): 95.5 (22 @ 21:25), Max: 98.9 (22 @ 19:09)  HR: 62 (22 @ 21:25)  BP: 130/58 (22 @ 21:25)  RR: 13 (22 @ 21:25)  SpO2: 100% (22 @ 21:25)  Wt(kg): --    Height (cm): 167.6 ( @ 16:19)  Weight (kg): 72.6 ( @ 16:19)  BMI (kg/m2): 25.8 ( @ 16:19)  BSA (m2): 1.82 ( @ 16:19)  CAPILLARY BLOOD GLUCOSE      POCT Blood Glucose.: 175 mg/dL (2022 21:44)  POCT Blood Glucose.: 165 mg/dL (2022 20:01)  POCT Blood Glucose.: 369 mg/dL (2022 17:28)  POCT Blood Glucose.: 358 mg/dL (2022 16:54)      Review of Systems:  all other ROS negative     PHYSICAL EXAM:  GENERAL: Alert, awake, oriented x3 though appears frail   CHEST/LUNG: Bilateral clear breath sounds no rales, rhonchi or wheezing  HEART: Regular rate and rhythm, no murmur, no gallops, no rub   ABDOMEN: Soft, nontender, non distended ileostomy bag in place   EXTREMITIES: no pedal edema  SKIN: No rash or skin lesion   ACCESS: LUE AVF w/bruit and thrill     LABS:      114<LL>  |  92<L>  |  87<H>  ----------------------------<  354<H>  7.6<HH>   |  8<LL>  |  4.39<H>    Ca    8.2<L>      2022 17:15  Mg     1.9         TPro  8.3<H>  /  Alb  3.7  /  TBili  0.1<L>  /  DBili      /  AST  18  /  ALT  17  /  AlkPhos  114      Creatinine Trend: 4.39 <--                        9.6    10.33 )-----------( 253      ( 2022 17:15 )             28.7     Urine Studies:  Urinalysis Basic - ( 2022 19:08 )    Color: Yellow / Appearance: Clear / S.010 / pH:   Gluc:  / Ketone: NEGATIVE  / Bili: NEGATIVE / Urobili: 0.2 E.U./dL   Blood:  / Protein: NEGATIVE mg/dL / Nitrite: NEGATIVE   Leuk Esterase: NEGATIVE / RBC: < 5 /HPF / WBC < 5 /HPF   Sq Epi:  / Non Sq Epi: 5-10 /HPF / Bacteria: Present /HPF

## 2022-01-24 NOTE — H&P ADULT - HISTORY OF PRESENT ILLNESS
62M with HTN, DM c/b neuropathy, EtOH liver cirrhosis, toxic megacolon c/b pneumatosis s/p colectomy/ileostomy, and recent admission 1/6-1/19/2022 for weakness found to have acute renal failure requiring hemodialysis, initially presenting to [facility] with generalized weakess.    Regarding last admission, found to have ARF 2/2 ATN. Had been on ICU and telemetry from altered mental status 2/2 renal failure and intermittent hypotension 2/2 HD. HD catheter had been removed when pt recovered urinary output, thought to have recovered renal function sufficiently to no longer require HD as outpatient. Course c/b urinary tract infection treated with ceftriaxone. Was discharged to shelter.    In the ED,  Vitals:  Labs:    UA:  Imaging:    EKG:    CXR:  ED Course:   62M with HTN, DM c/b neuropathy, EtOH liver cirrhosis, toxic megacolon c/b pneumatosis s/p colectomy/ileostomy, and recent admission 1/6-1/19/2022 for weakness found to have acute renal failure requiring hemodialysis, initially presenting to [facility] with generalized weakess.    Regarding last admission, found to have ARF 2/2 ATN. Had been on ICU and telemetry from altered mental status 2/2 renal failure and intermittent hypotension 2/2 HD. HD catheter had been removed when pt recovered urinary output, thought to have recovered renal function sufficiently to no longer require HD as outpatient. Course c/b urinary tract infection treated with ceftriaxone. Was discharged to shelter.   Of note, pt also admitted 07/27/2021-08/1/2021 for MEG c/b hyperkalemia, responsive to medical therapy and not requiring hemodialysis.    At [facility],  Vitals: T 98.2, HR 61, /68, RR 17, O2sat 98% on room air  Labs: Hb 9.6 | Na 114, Cl 92, K 7.6, HCO3 8, BUN 87, Cr 4.39, glucose 354  Imaging:    EKG:    CXR:  ED Course:   62M with HTN, DM c/b neuropathy, EtOH liver cirrhosis, toxic megacolon c/b pneumatosis s/p colectomy/ileostomy, and recent admission 1/6-1/19/2022 for weakness found to have acute renal failure requiring hemodialysis, initially presenting to [facility] with generalized weakess.    Regarding last admission, found to have ARF 2/2 ATN. Had been on ICU and telemetry from altered mental status 2/2 renal failure and intermittent hypotension 2/2 HD. HD catheter had been removed when pt recovered urinary output, thought to have recovered renal function sufficiently to no longer require HD as outpatient. Course c/b urinary tract infection treated with ceftriaxone. Was discharged to shelter.   Of note, pt also admitted 07/27/2021-08/1/2021 for MEG c/b hyperkalemia, responsive to medical therapy and not requiring hemodialysis.    At [facility],  Vitals: T 98.2, HR 61, /68, RR 17, O2sat 98% on room air  Labs: Hb 9.6 | Na 114, Cl 92, K 7.6, HCO3 8, BUN 87, Cr 4.39, glucose 354  Imaging:    EKG: left axis deviation, sinus bradycardia at HR 51, peaked T waves    CXR: no acute infiltrates or consolidations  ED Course: given D5+insulin, lokelma, bicarb, albuterol, calcium gluconate. Transferred to [facility] ICU for emergent dialysis.    Upon arrival to [facility] ICU, renal consulted for emergent dialysis. R IJ HD catheter placed and intermittent HD initiated overnight.

## 2022-01-24 NOTE — CONSULT NOTE ADULT - ASSESSMENT
62yo M hx of ?etoh cirrhosis, HTN, DM2 with neuropathy, hx of toxic megacolon c/b pneumatosis s/p colectomy and ileostomy, admission 1 months ago for hyperkalemia and hyponatremia, presents with generalized weakness malaise and lightheadedness  found to be in ARF ; nephrology consulted for emergent hemodialysis    Assessment/Plan:   #non-oliguric MEG on CKD   #hyperkalemia  #hyponatremia  #non-anion gap acidosis     unclear baseline creatinine  etiology of MEG includes:   ischemic ATN possible given labile pressures in the past   pre-renal possible given poor PO intake in combination w/ increased output from ileostomy   contrast induced nephropathy unlikely as no known contrast exposure   rhabdomyolysis unlikely given no hx of trauma though pending CKMB panel   no NSAID use   obstruction possible given that patient has had history of urinary retention on previous admissions       Recommend:   STAT bladder scan r/o obstruction and soliz  patients electrolyte derrangements require emergent HD  add-on CPK/CKMB panel  please obtain UA Deloris Urine Cr Usom Serum Osm TSH Am Cortisol uric acid   hold HCTZ and trazadone in setting of hyponatremia  repeat BMP post HD  urine protein-creatinine ratio   daily CXR   renal sono  strict I/Os   renal diet         Thank you for the opportunity to participate in the care of your patient. The nephrology service remains available to assist with any questions or concerns. Please feel free to reach us by paging the on-call nephrology fellow for urgent issues or as below.     Yumiko Santos D.O  PGY-4, Nephrology Fellow   P: [phone] 60yo M hx of ?etoh cirrhosis, HTN, DM2 with neuropathy, hx of toxic megacolon c/b pneumatosis s/p colectomy and ileostomy, admission 1 months ago for hyperkalemia and hyponatremia, presents with generalized weakness malaise and lightheadedness  found to be in ARF ; nephrology consulted for emergent hemodialysis    Assessment/Plan:   #non-oliguric MEG on CKD   #hyperkalemia  #hyponatremia  #non-anion gap acidosis     unclear baseline creatinine  etiology of MEG includes:   ischemic ATN possible given labile pressures in the past   pre-renal possible given poor PO intake in combination w/ increased output from ileostomy   contrast induced nephropathy unlikely as no known contrast exposure   rhabdomyolysis unlikely given no hx of trauma though pending CKMB panel   no NSAID use   obstruction possible given that patient has had history of urinary retention on previous admissions       Recommend:   STAT bladder scan r/o obstruction and soliz  patients electrolyte derrangements require emergent HD  add-on CPK/CKMB panel  please obtain UA Deloris Urine Cr Usom Serum Osm TSH Am Cortisol uric acid   hold HCTZ and trazadone in setting of hyponatremia  repeat BMP post HD  urine protein-creatinine ratio   daily CXR   renal sono  strict I/Os   renal diet     Hyponatremia  symptomatic hypovolemic hyponatremia, chronic  please obtain serum osm, urine na, urine osm, TSH, AM cortisol  would hold home HCTZ/trazadone  can commence 3% hypertonic saline (would advise starting @ 30cc/hour for 4 hours)  recheck BMP w/ repeat urine lytes  goal by 5Pm tomorrow 120  should not exceed more than 4-6 meq increase in 24 hours     Thank you for the opportunity to participate in the care of your patient. The nephrology service remains available to assist with any questions or concerns. Please feel free to reach us by paging the on-call nephrology fellow for urgent issues or as below.     Yumiko Santos D.O  PGY-4, Nephrology Fellow   P: [phone] 62yo M hx of ?etoh cirrhosis, HTN, DM2 with neuropathy, hx of toxic megacolon c/b pneumatosis s/p colectomy and ileostomy, admission 1 months ago for hyperkalemia and hyponatremia, presents with generalized weakness malaise and lightheadedness  found to be in ARF ; nephrology consulted for emergent hemodialysis    Assessment/Plan:   #non-oliguric MEG on CKD   #hyperkalemia  #hyponatremia  #non-anion gap acidosis     unclear baseline creatinine  etiology of MEG includes:   ischemic ATN possible given labile pressures in the past   pre-renal possible given poor PO intake in combination w/ increased output from ileostomy   contrast induced nephropathy unlikely as no known contrast exposure   rhabdomyolysis unlikely given no hx of trauma though pending CKMB panel   no NSAID use   obstruction possible given that patient has had history of urinary retention on previous admissions       Recommend:   STAT bladder scan r/o obstruction and soliz  patients electrolyte derrangements require emergent HD  add-on CPK/CKMB panel  please obtain UA Deloris Urine Cr Usom Serum Osm TSH Am Cortisol uric acid   hold HCTZ and trazadone in setting of hyponatremia  repeat BMP post HD  urine protein-creatinine ratio   daily CXR   renal sono  strict I/Os   renal diet         Thank you for the opportunity to participate in the care of your patient. The nephrology service remains available to assist with any questions or concerns. Please feel free to reach us by paging the on-call nephrology fellow for urgent issues or as below.     Yumiko Santos D.O  PGY-4, Nephrology Fellow   P: [phone]

## 2022-01-24 NOTE — ED ADULT NURSE NOTE - COVID-19 RESULT DATE/TIME
Pt reported that since increasing hydralazine one week ago, MAPs have been 84, 90, 84, 84. Pt uses doppler at home. Discussed pt with Irais. Per Irais, no changes or new orders. Called pt back to let him know no changes; pt verbalized understanding. Pt will RTC 10/9. Encouraged pt to call [facility] Coord with any questions or concerns; pt verbalized understanding.   
18-Jan-2022 12:29

## 2022-01-24 NOTE — CONSULT NOTE ADULT - NSCONSULTADDITIONALINFOA_GEN_ALL_CORE
Attending:  Case reviewed and discussed with renal fellow.  Agree with details of Dr. Santos's note above.   Renal service will follow with you.

## 2022-01-24 NOTE — ED ADULT NURSE NOTE - PAIN RATING/NUMBER SCALE (0-10): REST
0
[FreeTextEntry8] : 48-year-old female presents with a couple of complaints first is that she recently had a very heavy period which was more midcycle. She typically has heavy menses monthly the last 4 total of 6 days with 3 days of heavy bleeding but this past month a week or so after she had her regular period she had another very heavy period and she is concerned about anemia. She takes iron daily\par \par Also complaining about a lump on the outside of her upper left arm which she feels has recently gotten bigger. It's nontender, movable and soft.\par \par Also needs referral to urologist as she has not seen one in a while for a right hydronephrosis because her regular urologist retired.

## 2022-01-24 NOTE — H&P ADULT - NSHPREVIEWOFSYSTEMS_GEN_ALL_CORE
Problem List/Main Diagnoses (system-based):   #metabolic encephalopathy c/b delerium, UTI  - completed 5 day course of CTX for UTI  - Seroquel 25mg nightly for agitation    #HTN  c/w with hydralazine 50mg every 8 hours (prescription at time of DC)  c/w amlodipine 10mg every day    #Hx of Ostomy s/p toxic megacolon  -Good ostomy output    #DM  - on lantus 3 units and sliding scale  - lispro 2U TID    #Neuropathic pain  On gabapentin 300mg daily    Inpatient treatment course: ceftriaxone, amlodipine, duloxetine 30mg, ferrous sulfate 325, hydralazine 50, neurontin 300mg, tamsulosin 0.4, trazodone 100mg Qd  New medications: hydralazine, amlodipine, duloxetine 30mg, ferrous sulfate 325, hydralazine 50, neurontin 300mg, tamsulosin 0.4, trazodone 100mg Qd  Labs to be followed outpatient: n/a  Exam to be followed outpatient: n/a Problem List/Main Diagnoses (system-based):   #HTN  c/w with hydralazine 50mg every 8 hours (prescription at time of DC)  c/w amlodipine 10mg every day    Inpatient treatment course: ceftriaxone, amlodipine, duloxetine 30mg, ferrous sulfate 325, hydralazine 50, neurontin 300mg, tamsulosin 0.4, trazodone 100mg Qd  New medications: amlodipine, duloxetine 30mg, ferrous sulfate 325, hydralazine 50, neurontin 300mg, tamsulosin 0.4, trazodone 100mg Qd

## 2022-01-24 NOTE — ED ADULT TRIAGE NOTE - CHIEF COMPLAINT QUOTE
SKYLER from [facility] for generalized malaise and s/s of failure to thrive. pt currently with flat affect. denies any c/o pain. admits to being recently discharged from [facility]. poa colostomy

## 2022-01-24 NOTE — H&P ADULT - NSHPPHYSICALEXAM_GEN_ALL_CORE
VITAL SIGNS:  T(C): 36.7 (01-25-22 @ 04:30), Max: 37.2 (01-24-22 @ 19:09)  HR: 73 (01-25-22 @ 07:00) (55 - 78)  BP: 131/61 (01-25-22 @ 07:00) (106/57 - 176/87)  RR: 13 (01-25-22 @ 07:00) (11 - 18)  SpO2: 98% (01-25-22 @ 07:00) (97% - 100%)    PHYSICAL EXAM:  Constitutional: comfortable in bed; NAD  Neurologic: AAOx3; no focal deficits  HEENT: clear conjunctiva, anicteric sclera; no nasal discharge; no oropharyngeal erythema or exudates, MMM  Neck: supple; no cervical, post-auricular or supraclavicular lymphadenopathy  Respiratory: CTA B/L, no wheezes/rales/rhonchi, no diminished breath sounds, no increased work of breathing or accessory muscle use  Cardiac: +S1/S2, no murmurs/rubs/gallops, RRR  Gastrointestinal: abdomen soft, NT/ND; no rebound or guarding, no hepatosplenomegaly; +BSx4; ostomy in place with stool in bag  Genitourinary: no suprapubic tenderness  Extremities: no peripheral edema b/l  Vascular: 2+ radial, femoral, DP/PT pulses B/L  Psychiatric: affect and characteristics of appearance, verbalizations, behaviors are appropriate

## 2022-01-24 NOTE — PATIENT PROFILE ADULT - FALL HARM RISK - HARM RISK INTERVENTIONS

## 2022-01-24 NOTE — ED PROVIDER NOTE - OBJECTIVE STATEMENT
61 y/o male with PMHx of etoh cirrhosis, HTN, DM2 w neuropathy, hx of toxic megacolon c/b pneumatosis s/p colectomy and ileostomy, admission 6 months ago for hyperkalemia and hyponatremia, and MDD (on medications Nexium, Lisinopril, Doc-Q-Lace, Neurontin, Amlodipine, Citalopram, Metformin, Rifampin, Insulin, Metoprolol, Quetiapine, Trazadone, Levothyroxine and Senna-Lax) presents to the ED complaining of general weakness, and lack of appetite. Patient states he has been unable to get out of bed to have meals at the shelter. Otherwise he denies fever, chills, chest pain, shortness of breath, abdominal pain. He denies trouble with colostomy. Old chart reviewed showing recent admission for acute renal failure, which has since resolved with emergent dialysis at that time. Patient was discharged from the shelter 5 days ago. It seems that since then, patient has been steadily deteriorating. Denies trauma or falls.

## 2022-01-24 NOTE — H&P ADULT - NSHPLABSRESULTS_GEN_ALL_CORE
LABS:                        9.1    10.09 )-----------( 233      ( 2022 23:58 )             27.7         135  |  99  |  21  ----------------------------<  115<H>  3.2<L>   |  27  |  1.08    Ca    8.8      2022 04:26  Phos  2.1       Mg     1.7         TPro  8.3<H>  /  Alb  3.7  /  TBili  0.1<L>  /  DBili  x   /  AST  18  /  ALT  17  /  AlkPhos  114        PT/INR - ( 2022 23:58 )   PT: 12.4 sec;   INR: 1.04     PTT - ( 2022 23:58 )  PTT:31.3 sec    CAPILLARY BLOOD GLUCOSE  POCT Blood Glucose.: 175 mg/dL (2022 21:44)      Urinalysis Basic - ( 2022 19:08 )  Color: Yellow / Appearance: Clear / S.010 / pH: x  Gluc: x / Ketone: NEGATIVE  / Bili: NEGATIVE / Urobili: 0.2 E.U./dL   Blood: x / Protein: NEGATIVE mg/dL / Nitrite: NEGATIVE   Leuk Esterase: NEGATIVE / RBC: < 5 /HPF / WBC < 5 /HPF   Sq Epi: x / Non Sq Epi: 5-10 /HPF / Bacteria: Present /HPF      ABG - ( 2022 18:17 )  pH, Arterial: 7.12  pH, Blood: x     /  pCO2: 22    /  pO2: 128   / HCO3: x     / Base Excess: x     /  SaO2: 100.0       I & O Summary:    22 @ 07:  -  22 @ 05:52  --------------------------------------------------------  IN: 400 mL / OUT: 1040 mL / NET: -640 mL      RADIOLOGY, EKG AND ADDITIONAL TESTS: Reviewed.

## 2022-01-24 NOTE — ED PROVIDER NOTE - CLINICAL SUMMARY MEDICAL DECISION MAKING FREE TEXT BOX
Patient presents with generalized weakness and loss of appetite. Will workup for failure to thrive/weakness. Exam nonfocal, will reassess.

## 2022-01-24 NOTE — ED PROVIDER NOTE - PROGRESS NOTE DETAILS
Pt w acute renal failure and hyperkalemia w ekg changes. Started hyperkalemia treatment. presented case for admission to MICU, Dr Manning accepts under Dr Pruett.

## 2022-01-24 NOTE — ED PROVIDER NOTE - INPATIENT RESIDENT/ACP NOTIFIED
Patient:   KRISTYN JAMES            MRN: [id number]            FIN: [id number]              Age:   89 years     Sex:  MALE     :  25   Associated Diagnoses:   Cough; Bacterial pneumonia; Bladder cancer; Parkinsonism; Prostate cancer  Author:   ZOHAIB CHAVIRA     Basic Information   History source:  Patient.      Subjective   Additional Info Admitted with Pneumonitis, respiratory panel pend. Influenza neg.  ID consult done  Cc- constipation, however nursing reports he may have moved bowels, if not Fleet Enema ordered. .       Review of Systems   Constitutional:  Weakness.    Eye:  Negative.    Ear/Nose/Mouth/Throat:  Negative.    Cardiovascular:  Negative.    Respiratory:  Cough.    Gastrointestinal:  Constipation.    Genitourinary:  Negative.    Musculoskeletal:  Negative except as documented in history of present illness.   Integumentary:  Negative.    Hematology/Lymphatics:  Negative.    Neurologic:  Alert and oriented X4, [place], hearing aids in place.    Endocrine:  Negative.    Allergy/Immunologic:  Negative except as documented in history of present illness.   Psychiatric:  Negative except as documented in history of present illness.   All other systems All other systems are negative.     Physical Examination   VS/Measurements     Vitals between:   05-OCT-2019 12:18:46   TO   06-OCT-2019 12:18:46                   LAST RESULT MINIMUM MAXIMUM  Temperature 36.5 36.5 37  Heart Rate 68 61 70  Respiratory Rate 18 16 20  NISBP           132 119 133  NIDBP           73 69 75  SpO2                    94 94 96    General:  Alert and oriented, No acute distress.    Eye:  Normal conjunctiva.    HENT:  Normocephalic.    Neck:  Supple, Non-tender.    Respiratory:  Respirations are non-labored, Breath sounds are equal, Few basal rales, good effort. .   Cardiovascular:  Normal rate, Regular rhythm, Tele reviewed, monitored based on clinical conditions, he is chem code, thus rhythm needs monitoring.  NE 0.16 -0.2, QRS 0.1, no significant ectopics.   Gastrointestinal:  Soft, Non-tender, Non-distended.    Musculoskeletal:  Generally weak, in bed now. .    Integumentary:  Warm, Dry.    Neurologic:  Alert, Oriented.    Cognition and Speech:  Oriented, Speech clear and coherent, Functional cognition intact.   Psychiatric:  Cooperative, Appropriate mood & affect, Normal judgment, Non-suicidal.      Review / Management   Laboratory results:     No Qualifying Labs are resulted on this patient in the last 24 hours  .      Impression and Plan   Dx and Plan:     Diagnosis     Complaint of Cough (PNED [id number], Reason For Visit, Medical).    Bacterial pneumonia (HGS15-EI J15.9, Diagnosis, Hospitalized, Medical).    Bladder cancer (VZV86-SL C67.9, Diagnosis, Hospitalized, Medical).    Parkinsonism ([id number] G20, Diagnosis, Hospitalized, Medical).     Prostate cancer (PEK98-WZ C61, Diagnosis, Hospitalized, Medical).    .         Course: Progressing as expected, Given age of 89 years, he is weaker now than at admission. Expect return to baseline. He is DNR. Will need rehab. Prognosis guarded to poor based on age and medical conditions. , Discussed with nursing. .   Dr Manning

## 2022-01-25 DIAGNOSIS — E78.5 HYPERLIPIDEMIA, UNSPECIFIED: ICD-10-CM

## 2022-01-25 DIAGNOSIS — Z29.9 ENCOUNTER FOR PROPHYLACTIC MEASURES, UNSPECIFIED: ICD-10-CM

## 2022-01-25 DIAGNOSIS — E87.1 HYPO-OSMOLALITY AND HYPONATREMIA: ICD-10-CM

## 2022-01-25 DIAGNOSIS — E87.5 HYPERKALEMIA: ICD-10-CM

## 2022-01-25 DIAGNOSIS — Z93.9 ARTIFICIAL OPENING STATUS, UNSPECIFIED: ICD-10-CM

## 2022-01-25 DIAGNOSIS — E83.39 OTHER DISORDERS OF PHOSPHORUS METABOLISM: ICD-10-CM

## 2022-01-25 DIAGNOSIS — R41.0 DISORIENTATION, UNSPECIFIED: ICD-10-CM

## 2022-01-25 DIAGNOSIS — E11.40 TYPE 2 DIABETES MELLITUS WITH DIABETIC NEUROPATHY, UNSPECIFIED: ICD-10-CM

## 2022-01-25 DIAGNOSIS — E03.9 HYPOTHYROIDISM, UNSPECIFIED: ICD-10-CM

## 2022-01-25 DIAGNOSIS — I10 ESSENTIAL (PRIMARY) HYPERTENSION: ICD-10-CM

## 2022-01-25 DIAGNOSIS — N17.9 ACUTE KIDNEY FAILURE, UNSPECIFIED: ICD-10-CM

## 2022-01-25 DIAGNOSIS — R00.1 BRADYCARDIA, UNSPECIFIED: ICD-10-CM

## 2022-01-25 DIAGNOSIS — N40.1 BENIGN PROSTATIC HYPERPLASIA WITH LOWER URINARY TRACT SYMPTOMS: ICD-10-CM

## 2022-01-25 DIAGNOSIS — K70.30 ALCOHOLIC CIRRHOSIS OF LIVER WITHOUT ASCITES: ICD-10-CM

## 2022-01-25 DIAGNOSIS — G93.41 METABOLIC ENCEPHALOPATHY: ICD-10-CM

## 2022-01-25 DIAGNOSIS — E44.0 MODERATE PROTEIN-CALORIE MALNUTRITION: ICD-10-CM

## 2022-01-25 DIAGNOSIS — E87.2 ACIDOSIS: ICD-10-CM

## 2022-01-25 DIAGNOSIS — F10.10 ALCOHOL ABUSE, UNCOMPLICATED: ICD-10-CM

## 2022-01-25 DIAGNOSIS — M79.604 PAIN IN RIGHT LEG: ICD-10-CM

## 2022-01-25 DIAGNOSIS — N39.0 URINARY TRACT INFECTION, SITE NOT SPECIFIED: ICD-10-CM

## 2022-01-25 DIAGNOSIS — B96.1 KLEBSIELLA PNEUMONIAE [K. PNEUMONIAE] AS THE CAUSE OF DISEASES CLASSIFIED ELSEWHERE: ICD-10-CM

## 2022-01-25 DIAGNOSIS — N17.0 ACUTE KIDNEY FAILURE WITH TUBULAR NECROSIS: ICD-10-CM

## 2022-01-25 DIAGNOSIS — I95.9 HYPOTENSION, UNSPECIFIED: ICD-10-CM

## 2022-01-25 DIAGNOSIS — Z93.3 COLOSTOMY STATUS: ICD-10-CM

## 2022-01-25 DIAGNOSIS — E11.9 TYPE 2 DIABETES MELLITUS WITHOUT COMPLICATIONS: ICD-10-CM

## 2022-01-25 DIAGNOSIS — D50.9 IRON DEFICIENCY ANEMIA, UNSPECIFIED: ICD-10-CM

## 2022-01-25 LAB
ANION GAP SERPL CALC-SCNC: 10 MMOL/L — SIGNIFICANT CHANGE UP (ref 5–17)
ANION GAP SERPL CALC-SCNC: 10 MMOL/L — SIGNIFICANT CHANGE UP (ref 5–17)
ANION GAP SERPL CALC-SCNC: 9 MMOL/L — SIGNIFICANT CHANGE UP (ref 5–17)
ANION GAP SERPL CALC-SCNC: 9 MMOL/L — SIGNIFICANT CHANGE UP (ref 5–17)
APTT BLD: 31.3 SEC — SIGNIFICANT CHANGE UP (ref 27.5–35.5)
BASOPHILS # BLD AUTO: 0.04 K/UL — SIGNIFICANT CHANGE UP (ref 0–0.2)
BASOPHILS NFR BLD AUTO: 0.4 % — SIGNIFICANT CHANGE UP (ref 0–2)
BLD GP AB SCN SERPL QL: NEGATIVE — SIGNIFICANT CHANGE UP
BUN SERPL-MCNC: 21 MG/DL — SIGNIFICANT CHANGE UP (ref 7–23)
BUN SERPL-MCNC: 24 MG/DL — HIGH (ref 7–23)
BUN SERPL-MCNC: 26 MG/DL — HIGH (ref 7–23)
BUN SERPL-MCNC: 30 MG/DL — HIGH (ref 7–23)
CALCIUM SERPL-MCNC: 8.4 MG/DL — SIGNIFICANT CHANGE UP (ref 8.4–10.5)
CALCIUM SERPL-MCNC: 8.4 MG/DL — SIGNIFICANT CHANGE UP (ref 8.4–10.5)
CALCIUM SERPL-MCNC: 8.8 MG/DL — SIGNIFICANT CHANGE UP (ref 8.4–10.5)
CALCIUM SERPL-MCNC: 8.9 MG/DL — SIGNIFICANT CHANGE UP (ref 8.4–10.5)
CHLORIDE SERPL-SCNC: 95 MMOL/L — LOW (ref 96–108)
CHLORIDE SERPL-SCNC: 96 MMOL/L — SIGNIFICANT CHANGE UP (ref 96–108)
CHLORIDE SERPL-SCNC: 97 MMOL/L — SIGNIFICANT CHANGE UP (ref 96–108)
CHLORIDE SERPL-SCNC: 99 MMOL/L — SIGNIFICANT CHANGE UP (ref 96–108)
CO2 SERPL-SCNC: 23 MMOL/L — SIGNIFICANT CHANGE UP (ref 22–31)
CO2 SERPL-SCNC: 27 MMOL/L — SIGNIFICANT CHANGE UP (ref 22–31)
CREAT ?TM UR-MCNC: 32 MG/DL — SIGNIFICANT CHANGE UP
CREAT SERPL-MCNC: 1.08 MG/DL — SIGNIFICANT CHANGE UP (ref 0.5–1.3)
CREAT SERPL-MCNC: 1.42 MG/DL — HIGH (ref 0.5–1.3)
CREAT SERPL-MCNC: 1.53 MG/DL — HIGH (ref 0.5–1.3)
CREAT SERPL-MCNC: 1.58 MG/DL — HIGH (ref 0.5–1.3)
EOSINOPHIL # BLD AUTO: 0.11 K/UL — SIGNIFICANT CHANGE UP (ref 0–0.5)
EOSINOPHIL NFR BLD AUTO: 1.1 % — SIGNIFICANT CHANGE UP (ref 0–6)
GLUCOSE BLDC GLUCOMTR-MCNC: 213 MG/DL — HIGH (ref 70–99)
GLUCOSE BLDC GLUCOMTR-MCNC: 264 MG/DL — HIGH (ref 70–99)
GLUCOSE BLDC GLUCOMTR-MCNC: 305 MG/DL — HIGH (ref 70–99)
GLUCOSE SERPL-MCNC: 115 MG/DL — HIGH (ref 70–99)
GLUCOSE SERPL-MCNC: 174 MG/DL — HIGH (ref 70–99)
GLUCOSE SERPL-MCNC: 279 MG/DL — HIGH (ref 70–99)
GLUCOSE SERPL-MCNC: 308 MG/DL — HIGH (ref 70–99)
HCT VFR BLD CALC: 27.7 % — LOW (ref 39–50)
HGB BLD-MCNC: 9.1 G/DL — LOW (ref 13–17)
IMM GRANULOCYTES NFR BLD AUTO: 0.5 % — SIGNIFICANT CHANGE UP (ref 0–1.5)
INR BLD: 1.04 — SIGNIFICANT CHANGE UP (ref 0.88–1.16)
LYMPHOCYTES # BLD AUTO: 1.37 K/UL — SIGNIFICANT CHANGE UP (ref 1–3.3)
LYMPHOCYTES # BLD AUTO: 13.6 % — SIGNIFICANT CHANGE UP (ref 13–44)
MAGNESIUM SERPL-MCNC: 1.3 MG/DL — LOW (ref 1.6–2.6)
MAGNESIUM SERPL-MCNC: 1.7 MG/DL — SIGNIFICANT CHANGE UP (ref 1.6–2.6)
MAGNESIUM SERPL-MCNC: 1.7 MG/DL — SIGNIFICANT CHANGE UP (ref 1.6–2.6)
MCHC RBC-ENTMCNC: 29.6 PG — SIGNIFICANT CHANGE UP (ref 27–34)
MCHC RBC-ENTMCNC: 32.9 GM/DL — SIGNIFICANT CHANGE UP (ref 32–36)
MCV RBC AUTO: 90.2 FL — SIGNIFICANT CHANGE UP (ref 80–100)
MONOCYTES # BLD AUTO: 0.87 K/UL — SIGNIFICANT CHANGE UP (ref 0–0.9)
MONOCYTES NFR BLD AUTO: 8.6 % — SIGNIFICANT CHANGE UP (ref 2–14)
NEUTROPHILS # BLD AUTO: 7.65 K/UL — HIGH (ref 1.8–7.4)
NEUTROPHILS NFR BLD AUTO: 75.8 % — SIGNIFICANT CHANGE UP (ref 43–77)
NRBC # BLD: 0 /100 WBCS — SIGNIFICANT CHANGE UP (ref 0–0)
OSMOLALITY SERPL: 283 MOSM/KG — SIGNIFICANT CHANGE UP (ref 280–301)
OSMOLALITY UR: 277 MOSM/KG — LOW (ref 300–900)
PHOSPHATE SERPL-MCNC: 2.1 MG/DL — LOW (ref 2.5–4.5)
PHOSPHATE SERPL-MCNC: 2.5 MG/DL — SIGNIFICANT CHANGE UP (ref 2.5–4.5)
PHOSPHATE SERPL-MCNC: 4.1 MG/DL — SIGNIFICANT CHANGE UP (ref 2.5–4.5)
PLATELET # BLD AUTO: 233 K/UL — SIGNIFICANT CHANGE UP (ref 150–400)
POTASSIUM SERPL-MCNC: 3.2 MMOL/L — LOW (ref 3.5–5.3)
POTASSIUM SERPL-MCNC: 4.1 MMOL/L — SIGNIFICANT CHANGE UP (ref 3.5–5.3)
POTASSIUM SERPL-MCNC: 4.6 MMOL/L — SIGNIFICANT CHANGE UP (ref 3.5–5.3)
POTASSIUM SERPL-MCNC: 4.9 MMOL/L — SIGNIFICANT CHANGE UP (ref 3.5–5.3)
POTASSIUM SERPL-SCNC: 3.2 MMOL/L — LOW (ref 3.5–5.3)
POTASSIUM SERPL-SCNC: 4.1 MMOL/L — SIGNIFICANT CHANGE UP (ref 3.5–5.3)
POTASSIUM SERPL-SCNC: 4.6 MMOL/L — SIGNIFICANT CHANGE UP (ref 3.5–5.3)
POTASSIUM SERPL-SCNC: 4.9 MMOL/L — SIGNIFICANT CHANGE UP (ref 3.5–5.3)
PROTHROM AB SERPL-ACNC: 12.4 SEC — SIGNIFICANT CHANGE UP (ref 10.6–13.6)
RBC # BLD: 3.07 M/UL — LOW (ref 4.2–5.8)
RBC # FLD: 15.2 % — HIGH (ref 10.3–14.5)
RH IG SCN BLD-IMP: POSITIVE — SIGNIFICANT CHANGE UP
SODIUM SERPL-SCNC: 128 MMOL/L — LOW (ref 135–145)
SODIUM SERPL-SCNC: 128 MMOL/L — LOW (ref 135–145)
SODIUM SERPL-SCNC: 130 MMOL/L — LOW (ref 135–145)
SODIUM SERPL-SCNC: 135 MMOL/L — SIGNIFICANT CHANGE UP (ref 135–145)
SODIUM UR-SCNC: 28 MMOL/L — SIGNIFICANT CHANGE UP
WBC # BLD: 10.09 K/UL — SIGNIFICANT CHANGE UP (ref 3.8–10.5)
WBC # FLD AUTO: 10.09 K/UL — SIGNIFICANT CHANGE UP (ref 3.8–10.5)

## 2022-01-25 PROCEDURE — 99233 SBSQ HOSP IP/OBS HIGH 50: CPT | Mod: GC

## 2022-01-25 PROCEDURE — 99233 SBSQ HOSP IP/OBS HIGH 50: CPT

## 2022-01-25 PROCEDURE — 93010 ELECTROCARDIOGRAM REPORT: CPT

## 2022-01-25 PROCEDURE — 71045 X-RAY EXAM CHEST 1 VIEW: CPT | Mod: 26

## 2022-01-25 RX ORDER — FERROUS SULFATE 325(65) MG
325 TABLET ORAL DAILY
Refills: 0 | Status: DISCONTINUED | OUTPATIENT
Start: 2022-01-25 | End: 2022-01-31

## 2022-01-25 RX ORDER — GABAPENTIN 400 MG/1
300 CAPSULE ORAL DAILY
Refills: 0 | Status: DISCONTINUED | OUTPATIENT
Start: 2022-01-25 | End: 2022-01-25

## 2022-01-25 RX ORDER — SODIUM CHLORIDE 9 MG/ML
1000 INJECTION, SOLUTION INTRAVENOUS
Refills: 0 | Status: DISCONTINUED | OUTPATIENT
Start: 2022-01-25 | End: 2022-01-31

## 2022-01-25 RX ORDER — HEPARIN SODIUM 5000 [USP'U]/ML
5000 INJECTION INTRAVENOUS; SUBCUTANEOUS EVERY 8 HOURS
Refills: 0 | Status: DISCONTINUED | OUTPATIENT
Start: 2022-01-25 | End: 2022-01-31

## 2022-01-25 RX ORDER — SODIUM CHLORIDE 9 MG/ML
1000 INJECTION, SOLUTION INTRAVENOUS
Refills: 0 | Status: DISCONTINUED | OUTPATIENT
Start: 2022-01-25 | End: 2022-01-25

## 2022-01-25 RX ORDER — GLUCAGON INJECTION, SOLUTION 0.5 MG/.1ML
1 INJECTION, SOLUTION SUBCUTANEOUS ONCE
Refills: 0 | Status: DISCONTINUED | OUTPATIENT
Start: 2022-01-25 | End: 2022-01-31

## 2022-01-25 RX ORDER — ACETAMINOPHEN 500 MG
650 TABLET ORAL EVERY 6 HOURS
Refills: 0 | Status: DISCONTINUED | OUTPATIENT
Start: 2022-01-25 | End: 2022-01-31

## 2022-01-25 RX ORDER — TRAZODONE HCL 50 MG
50 TABLET ORAL AT BEDTIME
Refills: 0 | Status: DISCONTINUED | OUTPATIENT
Start: 2022-01-25 | End: 2022-01-31

## 2022-01-25 RX ORDER — DEXTROSE 50 % IN WATER 50 %
25 SYRINGE (ML) INTRAVENOUS ONCE
Refills: 0 | Status: DISCONTINUED | OUTPATIENT
Start: 2022-01-25 | End: 2022-01-31

## 2022-01-25 RX ORDER — LEVOTHYROXINE SODIUM 125 MCG
50 TABLET ORAL DAILY
Refills: 0 | Status: DISCONTINUED | OUTPATIENT
Start: 2022-01-25 | End: 2022-01-31

## 2022-01-25 RX ORDER — GABAPENTIN 400 MG/1
100 CAPSULE ORAL ONCE
Refills: 0 | Status: COMPLETED | OUTPATIENT
Start: 2022-01-25 | End: 2022-01-25

## 2022-01-25 RX ORDER — HYDRALAZINE HCL 50 MG
50 TABLET ORAL EVERY 8 HOURS
Refills: 0 | Status: DISCONTINUED | OUTPATIENT
Start: 2022-01-25 | End: 2022-01-31

## 2022-01-25 RX ORDER — ATORVASTATIN CALCIUM 80 MG/1
10 TABLET, FILM COATED ORAL AT BEDTIME
Refills: 0 | Status: DISCONTINUED | OUTPATIENT
Start: 2022-01-25 | End: 2022-01-31

## 2022-01-25 RX ORDER — TAMSULOSIN HYDROCHLORIDE 0.4 MG/1
0.4 CAPSULE ORAL AT BEDTIME
Refills: 0 | Status: DISCONTINUED | OUTPATIENT
Start: 2022-01-25 | End: 2022-01-31

## 2022-01-25 RX ORDER — DESMOPRESSIN ACETATE 0.1 MG/1
2 TABLET ORAL ONCE
Refills: 0 | Status: COMPLETED | OUTPATIENT
Start: 2022-01-25 | End: 2022-01-25

## 2022-01-25 RX ORDER — INSULIN LISPRO 100/ML
2 VIAL (ML) SUBCUTANEOUS
Refills: 0 | Status: DISCONTINUED | OUTPATIENT
Start: 2022-01-25 | End: 2022-01-31

## 2022-01-25 RX ORDER — CHLORHEXIDINE GLUCONATE 213 G/1000ML
1 SOLUTION TOPICAL
Refills: 0 | Status: DISCONTINUED | OUTPATIENT
Start: 2022-01-25 | End: 2022-01-31

## 2022-01-25 RX ORDER — MAGNESIUM SULFATE 500 MG/ML
1 VIAL (ML) INJECTION ONCE
Refills: 0 | Status: DISCONTINUED | OUTPATIENT
Start: 2022-01-25 | End: 2022-01-25

## 2022-01-25 RX ORDER — DEXTROSE 50 % IN WATER 50 %
15 SYRINGE (ML) INTRAVENOUS ONCE
Refills: 0 | Status: DISCONTINUED | OUTPATIENT
Start: 2022-01-25 | End: 2022-01-31

## 2022-01-25 RX ORDER — GABAPENTIN 400 MG/1
300 CAPSULE ORAL ONCE
Refills: 0 | Status: COMPLETED | OUTPATIENT
Start: 2022-01-25 | End: 2022-01-25

## 2022-01-25 RX ORDER — SODIUM CHLORIDE 9 MG/ML
2000 INJECTION, SOLUTION INTRAVENOUS
Refills: 0 | Status: DISCONTINUED | OUTPATIENT
Start: 2022-01-25 | End: 2022-01-25

## 2022-01-25 RX ORDER — AMLODIPINE BESYLATE 2.5 MG/1
10 TABLET ORAL EVERY 24 HOURS
Refills: 0 | Status: DISCONTINUED | OUTPATIENT
Start: 2022-01-25 | End: 2022-01-31

## 2022-01-25 RX ORDER — INSULIN LISPRO 100/ML
VIAL (ML) SUBCUTANEOUS
Refills: 0 | Status: DISCONTINUED | OUTPATIENT
Start: 2022-01-25 | End: 2022-01-31

## 2022-01-25 RX ORDER — DEXTROSE 50 % IN WATER 50 %
12.5 SYRINGE (ML) INTRAVENOUS ONCE
Refills: 0 | Status: DISCONTINUED | OUTPATIENT
Start: 2022-01-25 | End: 2022-01-31

## 2022-01-25 RX ORDER — INSULIN GLARGINE 100 [IU]/ML
3 INJECTION, SOLUTION SUBCUTANEOUS AT BEDTIME
Refills: 0 | Status: DISCONTINUED | OUTPATIENT
Start: 2022-01-25 | End: 2022-01-31

## 2022-01-25 RX ORDER — DULOXETINE HYDROCHLORIDE 30 MG/1
30 CAPSULE, DELAYED RELEASE ORAL DAILY
Refills: 0 | Status: DISCONTINUED | OUTPATIENT
Start: 2022-01-25 | End: 2022-01-31

## 2022-01-25 RX ORDER — DESMOPRESSIN ACETATE 0.1 MG/1
4 TABLET ORAL ONCE
Refills: 0 | Status: COMPLETED | OUTPATIENT
Start: 2022-01-25 | End: 2022-01-25

## 2022-01-25 RX ORDER — PANTOPRAZOLE SODIUM 20 MG/1
40 TABLET, DELAYED RELEASE ORAL
Refills: 0 | Status: DISCONTINUED | OUTPATIENT
Start: 2022-01-25 | End: 2022-01-31

## 2022-01-25 RX ADMIN — GABAPENTIN 300 MILLIGRAM(S): 400 CAPSULE ORAL at 09:46

## 2022-01-25 RX ADMIN — HEPARIN SODIUM 5000 UNIT(S): 5000 INJECTION INTRAVENOUS; SUBCUTANEOUS at 22:00

## 2022-01-25 RX ADMIN — Medication 650 MILLIGRAM(S): at 23:30

## 2022-01-25 RX ADMIN — GABAPENTIN 300 MILLIGRAM(S): 400 CAPSULE ORAL at 19:17

## 2022-01-25 RX ADMIN — CHLORHEXIDINE GLUCONATE 1 APPLICATION(S): 213 SOLUTION TOPICAL at 05:46

## 2022-01-25 RX ADMIN — TAMSULOSIN HYDROCHLORIDE 0.4 MILLIGRAM(S): 0.4 CAPSULE ORAL at 22:00

## 2022-01-25 RX ADMIN — DESMOPRESSIN ACETATE 4 MICROGRAM(S): 0.1 TABLET ORAL at 19:17

## 2022-01-25 RX ADMIN — HEPARIN SODIUM 5000 UNIT(S): 5000 INJECTION INTRAVENOUS; SUBCUTANEOUS at 13:17

## 2022-01-25 RX ADMIN — Medication 50 MILLIGRAM(S): at 22:00

## 2022-01-25 RX ADMIN — Medication 325 MILLIGRAM(S): at 11:19

## 2022-01-25 RX ADMIN — PANTOPRAZOLE SODIUM 40 MILLIGRAM(S): 20 TABLET, DELAYED RELEASE ORAL at 05:49

## 2022-01-25 RX ADMIN — DESMOPRESSIN ACETATE 2 MICROGRAM(S): 0.1 TABLET ORAL at 12:59

## 2022-01-25 RX ADMIN — Medication 50 MILLIGRAM(S): at 22:53

## 2022-01-25 RX ADMIN — HEPARIN SODIUM 5000 UNIT(S): 5000 INJECTION INTRAVENOUS; SUBCUTANEOUS at 05:45

## 2022-01-25 RX ADMIN — GABAPENTIN 300 MILLIGRAM(S): 400 CAPSULE ORAL at 05:45

## 2022-01-25 RX ADMIN — INSULIN GLARGINE 3 UNIT(S): 100 INJECTION, SOLUTION SUBCUTANEOUS at 22:52

## 2022-01-25 RX ADMIN — DULOXETINE HYDROCHLORIDE 30 MILLIGRAM(S): 30 CAPSULE, DELAYED RELEASE ORAL at 13:14

## 2022-01-25 RX ADMIN — Medication 50 MILLIGRAM(S): at 13:17

## 2022-01-25 RX ADMIN — Medication 650 MILLIGRAM(S): at 17:35

## 2022-01-25 RX ADMIN — Medication 8: at 22:52

## 2022-01-25 RX ADMIN — Medication 650 MILLIGRAM(S): at 09:45

## 2022-01-25 RX ADMIN — AMLODIPINE BESYLATE 10 MILLIGRAM(S): 2.5 TABLET ORAL at 09:32

## 2022-01-25 RX ADMIN — Medication 650 MILLIGRAM(S): at 10:14

## 2022-01-25 RX ADMIN — Medication 50 MICROGRAM(S): at 05:46

## 2022-01-25 RX ADMIN — Medication 650 MILLIGRAM(S): at 18:13

## 2022-01-25 RX ADMIN — GABAPENTIN 100 MILLIGRAM(S): 400 CAPSULE ORAL at 22:53

## 2022-01-25 RX ADMIN — Medication 4: at 15:36

## 2022-01-25 RX ADMIN — Medication 6: at 10:11

## 2022-01-25 RX ADMIN — SODIUM CHLORIDE 1000 MILLILITER(S): 9 INJECTION, SOLUTION INTRAVENOUS at 08:37

## 2022-01-25 RX ADMIN — ATORVASTATIN CALCIUM 10 MILLIGRAM(S): 80 TABLET, FILM COATED ORAL at 22:00

## 2022-01-25 NOTE — PROGRESS NOTE ADULT - ASSESSMENT
62M with HTN, DM c/b neuropathy, EtOH liver cirrhosis, toxic megacolon c/b pneumatosis s/p colectomy/ileostomy, and multiple admissions for ARF w/ hyperkalemia (most recently 1/6-1/19/2022, requiring HD), initially presenting to [facility] for generalized weakness since discharge, found to have acute renal failure with hyperkalemia and EKG changes and admitted to the ICU for emergent dialysis.

## 2022-01-25 NOTE — PROGRESS NOTE ADULT - ASSESSMENT
60yo M hx of ?etoh cirrhosis, HTN, DM2 with neuropathy, hx of toxic megacolon c/b pneumatosis s/p colectomy and ileostomy, admission 1 months ago for hyperkalemia and hyponatremia, presents with generalized weakness malaise and lightheadedness  found to be in ARF ; nephrology consulted for emergent hemodialysis    Assessment/Plan:   #non-oliguric MEG on CKD   #hyperkalemia  #hyponatremia  #non-anion gap acidosis     likely ischemic ATN v. pre-renal however no urine lytes have been obtained  recommend:  STAT urine NA urine osm urine cr   62yo M hx of ?etoh cirrhosis, HTN, DM2 with neuropathy, hx of toxic megacolon c/b pneumatosis s/p colectomy and ileostomy, admission 1 months ago for hyperkalemia and hyponatremia, presents with generalized weakness malaise and lightheadedness  found to be in ARF ; nephrology consulted for emergent hemodialysis    Assessment/Plan:   #non-oliguric MEG on CKD   #hyperkalemia  #hyponatremia, euvolemic  #non-anion gap acidosis     likely ischemic ATN v. pre-renal however no urine lytes have been obtained  recommend:  STAT urine NA urine osm urine cr  would give an additional liter of d5W  hold SSRI / HCTZ    recheck BMP and urine lytes q4H  goal Na by 5 pm tonight 120-122  strict i's and o's  renal diet   will assess need for further HD daily       62yo M hx of ?etoh cirrhosis, HTN, DM2 with neuropathy, hx of toxic megacolon c/b pneumatosis s/p colectomy and ileostomy, admission 1 months ago for hyperkalemia and hyponatremia, presents with generalized weakness malaise and lightheadedness  found to be in ARF ; nephrology consulted for emergent hemodialysis    Assessment/Plan:   #non-oliguric MEG on CKD   #hyperkalemia  #hyponatremia, euvolemic  #non-anion gap acidosis     likely ischemic ATN v. pre-renal however no urine lytes have been obtained  recommend:  STAT urine NA urine osm urine cr  would give an additional liter of d5W  hold SSRI / HCTZ    recheck BMP and urine lytes q4H  goal Na by 5 pm tonight 120-122  strict i's and o's  renal sono  renal diet   will assess need for further HD daily

## 2022-01-25 NOTE — PROGRESS NOTE ADULT - PROBLEM SELECTOR PLAN 9
Patient with history of DM complicated by peripheral neuropathy, home meds (from discharge 1/19): Janumet 50 mg-500 mg BID. On gabapentin 300mg TID for peripheral neuropathy  - Holding oral diabetes medications  - Lantus 3 units, Lispo 2 units TID, and continue mISS for coverage (based on last admission)  - Gabapentin requires dose adjustment for dialysis - will give 100mg after HD sessions

## 2022-01-25 NOTE — PROGRESS NOTE ADULT - PROBLEM SELECTOR PLAN 10
Patient with history of hypothyroidism, home meds (from discharge 1/19): synthroid 50mcg qd  - Continue home synthroid 50mcg qd

## 2022-01-25 NOTE — PROGRESS NOTE ADULT - PROBLEM SELECTOR PLAN 6
Patient with history of DM, home meds (from discharge 1/19): Janumet 50 mg-500 mg BID. on gabapentin 300mg TID for peripheral neuropathy  -holding oral diabetes medications  -monitor FSG and manage hyperglycemia with moderate ISS. based on insulin requirements will consider basal-bolus regimen    -gabapentin requires dose adjustment for dialysis - will give 100mg after HD sessions    #Neuropathic pain  On gabapentin 300mg daily  #DM  -    #Hypothyroidism  -home meds (from discharge 1/19): synthroid 50mcg qd  Plan:    -c/w home synthroid 50mcg qd Patient with history of HLD, home meds (from discharge 1/19): atorvastatin 10mg  - Continue atorvastatin 10mg qhs

## 2022-01-25 NOTE — PROGRESS NOTE ADULT - ASSESSMENT
62M with HTN, DM c/b neuropathy, EtOH liver cirrhosis, toxic megacolon c/b pneumatosis s/p colectomy/ileostomy, and multiple admissions for ARF w/ hyperkalemia (most recently 1/6-1/19/2022, requiring HD), initially presenting to [facility] for generalized weakness since discharge, found to have acute renal failure with hyperkalemia and EKG changes and admitted to the ICU for emergent dialysis.       NEUROLOGIC  -no acute issues    CARDIOLOGY  #Hypertension  -home meds (from discharge 1/19): hydrALAZINE 50mg q8hrs, norvasc 10mg qd  -/68 on admission  Plan:    -holding home antihypertensives in the setting of normotensive pressures and getting HD    -monitor BP and restart home antihypertensives as tolerated by blood pressure    #HLD  -home meds (from discharge 1/19): atorvastatin 10mg  Plan:    -c/w atorvastatin 10mg qhs during admission      RESPIRATORY  -no acute issues    GASTROINTESTINAL  #ostomy  -with ostomy due to hx of toxic megacolon. output draining brown stool.      ENDOCRINE  #DM  -home meds (from discharge 1/19): Janumet 50 mg-500 mg BID. on gabapentin 300mg TID for peripheral neuropathy  Plan:    -holding oral diabetes medications    -monitor FSG and manage hyperglycemia with moderate ISS.    -based on insulin requirements, will consider basal-bolus regimen    -restart gabapentin at dose adjustment for dialysis    #Hypothyroidism  -home meds (from discharge 1/19): synthroid 50mcg qd  Plan:    -c/w home synthroid 50mcg qd      RENAL & GENITOURINARY  #Acute renal failure  -multiple admissions for acute renal failure with hyperkalemia. 1/6-1/19/2022 admission to ICU for altered mental status 2/2 renal failure requiring HD. HD catheter had been removed when pt recovered urinary output, thought to have recovered renal function sufficiently to no longer require HD as outpatient. Also with admission 07/27/2021-08/1/2021 for MEG c/b hyperkalemia, responsive to medical therapy and not requiring hemodialysis.  -on ferrous sulfate tabs at home  -On admission, K 7.6, HCO3 8, BUN 87, Cr 4.39. EKG with peaked T waves. no evidence of fluid overload on exam.  -renal consulted upon arrival to ICU. R IJ HD cath placed at bedside and 1st episode of HD overnight.  Plan:    - avoid nephrotoxic medications and renally dose medications    - strict I/O    - renal following, appreciate recs         - f/u renal sono        - renal diet         - will assess need for further HD daily    #Hyponatremia  Nephrology following for rapid correction of Na from 114 -> 135 in 11 hours. D5W bolus started and 2mcg DDAVP given. 6 hour repeat showed Na 128. Next repeat showed increased Na 130. 4mcg DDAVP given. Continuing to trend.    - renal following, appreciate recs         - STAT urine NA urine osm urine cr        - would give an additional liter of d5W        - hold SSRI / HCTZ          - recheck BMP and urine lytes q4H        - goal Na by 5 pm tonight 120-122    #BPH  -home meds (from discharge 1/19): tamsulosin 0.4 mg qhs  Plan:    -c/w tamsulosin 0.4mg qhs    MISC  F: s/p 1L D5W, will give additional 2L  E: on HD, caution with repletion  N: renal diet, consistent carb, dash  Ppx: heparin 5k sq q8h    Code Status: FULL CODE    Dispo: MICU 62M with HTN, DM c/b neuropathy, EtOH liver cirrhosis, toxic megacolon c/b pneumatosis s/p colectomy/ileostomy, and multiple admissions for ARF w/ hyperkalemia (most recently 1/6-1/19/2022, requiring HD), initially presenting to [facility] for generalized weakness since discharge, found to have acute renal failure with hyperkalemia and EKG changes and admitted to the ICU for emergent dialysis.       NEUROLOGIC  -no acute issues    CARDIOLOGY  #Hypertension  -home meds (from discharge 1/19): hydrALAZINE 50mg q8hrs, norvasc 10mg qd  -/68 on admission  Plan:    -holding home antihypertensives in the setting of normotensive pressures and getting HD    -monitor BP and restart home antihypertensives as tolerated by blood pressure    #HLD  -home meds (from discharge 1/19): atorvastatin 10mg  Plan:    -c/w atorvastatin 10mg qhs during admission      RESPIRATORY  -no acute issues    GASTROINTESTINAL  #ostomy  -with ostomy due to hx of toxic megacolon. output draining brown stool.      ENDOCRINE  #DM  -home meds (from discharge 1/19): Janumet 50 mg-500 mg BID. on gabapentin 300mg TID for peripheral neuropathy  Plan:    -holding oral diabetes medications    -monitor FSG and manage hyperglycemia with moderate ISS.    -based on insulin requirements, will consider basal-bolus regimen    -restart gabapentin at dose adjustment for dialysis    #Hypothyroidism  -home meds (from discharge 1/19): synthroid 50mcg qd  Plan:    -c/w home synthroid 50mcg qd      RENAL & GENITOURINARY  #Acute renal failure  -multiple admissions for acute renal failure with hyperkalemia. 1/6-1/19/2022 admission to ICU for altered mental status 2/2 renal failure requiring HD. HD catheter had been removed when pt recovered urinary output, thought to have recovered renal function sufficiently to no longer require HD as outpatient. Also with admission 07/27/2021-08/1/2021 for MEG c/b hyperkalemia, responsive to medical therapy and not requiring hemodialysis.  -on ferrous sulfate tabs at home  -On admission, K 7.6, HCO3 8, BUN 87, Cr 4.39. EKG with peaked T waves. no evidence of fluid overload on exam.  -renal consulted upon arrival to ICU. R IJ HD cath placed at bedside and 1st episode of HD overnight.  Plan:    - avoid nephrotoxic medications and renally dose medications    - strict I/O    - renal following, appreciate recs         - f/u renal sono        - renal diet         - will assess need for further HD daily    #Hyponatremia  Nephrology following for rapid correction of Na from 114 -> 135 in 11 hours. D5W bolus started and 2mcg DDAVP given. 6 hour repeat showed Na 128. Next repeat showed increased Na 130. 4mcg DDAVP given. Continuing to trend.    - renal following, appreciate recs         - STAT urine NA urine osm urine cr        - would give an additional liter of d5W        - hold SSRI / HCTZ          - recheck BMP and urine lytes q4H        - goal Na by 5 pm tonight 120-122    #BPH  -home meds (from discharge 1/19): tamsulosin 0.4 mg qhs  Plan:    -c/w tamsulosin 0.4mg qhs    MISC  F: s/p 1L D5W, will give additional 2L  E: on HD, caution with repletion  N: renal diet, consistent carb, dash  Ppx: heparin 5k sq q8h    Code Status: FULL CODE    Dispo: MICU

## 2022-01-25 NOTE — PROCEDURE NOTE - NSPROCDETAILS_GEN_ALL_CORE
lumen(s) aspirated and flushed/sterile dressing applied/sterile technique, catheter placed/ultrasound guidance with use of sterile gel and probe cove

## 2022-01-25 NOTE — PROGRESS NOTE ADULT - SUBJECTIVE AND OBJECTIVE BOX
******INCOMPLETE******    MICU Progress Note  Wellington Gamez, PGY-1  Pager: [phone]    ******TRANSFER NOTE FROM MICU TO [facility]******    Patient is a 62y old  Male who presents with a chief complaint of acute renal failure (2022 11:45)    HOSPITAL COURSE:      OVERNIGHT EVENTS/INTERVAL HPI:    REVIEW OF SYSTEMS:  All other review of systems is negative unless indicated above.    OBJECTIVE:  T(C): 36.7 (22 @ 09:41), Max: 37.2 (22 @ 19:09)  HR: 79 (22 @ 14:00) (55 - 80)  BP: 133/60 (22 @ 14:00) (106/57 - 176/87)  RR: 14 (22 @ 14:00) (11 - 21)  SpO2: 100% (22 @ 14:00) (96% - 100%)  Daily Height in cm: 167.64 (2022 16:19)    Daily Weight in k.8 (2022 04:30)    Physical Exam:  General: in no acute distress  Eyes: EOMI intact bilaterally. Anicteric sclerae, moist conjunctivae  HENT: Moist mucous membranes  Neck: Trachea midline, supple  Lungs: CTA B/L. No wheezes, rales, or ronchi  Cardiovascular: RRR. No murmurs, rubs, or gallops  Abdomen: Soft, non-tender non-distended; No rebound or guarding  Extremities: Normal range of motion, No clubbing, cyanosis or edema  MSK: No midline bony tenderness. No CVA tenderness bilaterally  Neurological: Alert and oriented x3  Skin: Warm and dry. No obvious rash     Medications:  MEDICATIONS  (STANDING):  amLODIPine   Tablet 10 milliGRAM(s) Oral every 24 hours  atorvastatin 10 milliGRAM(s) Oral at bedtime  chlorhexidine 2% Cloths 1 Application(s) Topical <User Schedule>  dextrose 40% Gel 15 Gram(s) Oral once  dextrose 5%. 1000 milliLiter(s) (50 mL/Hr) IV Continuous <Continuous>  dextrose 5%. 1000 milliLiter(s) (100 mL/Hr) IV Continuous <Continuous>  dextrose 5%. 1000 milliLiter(s) (1000 mL/Hr) IV Continuous <Continuous>  dextrose 50% Injectable 25 Gram(s) IV Push once  dextrose 50% Injectable 12.5 Gram(s) IV Push once  dextrose 50% Injectable 25 Gram(s) IV Push once  DULoxetine 30 milliGRAM(s) Oral daily  ferrous    sulfate 325 milliGRAM(s) Oral daily  glucagon  Injectable 1 milliGRAM(s) IntraMuscular once  heparin   Injectable 5000 Unit(s) SubCutaneous every 8 hours  hydrALAZINE 50 milliGRAM(s) Oral every 8 hours  insulin lispro (ADMELOG) corrective regimen sliding scale   SubCutaneous Before meals and at bedtime  levothyroxine 50 MICROGram(s) Oral daily  pantoprazole    Tablet 40 milliGRAM(s) Oral before breakfast  tamsulosin 0.4 milliGRAM(s) Oral at bedtime    MEDICATIONS  (PRN):  acetaminophen     Tablet .. 650 milliGRAM(s) Oral every 6 hours PRN Temp greater or equal to 38C (100.4F), Mild Pain (1 - 3)      Labs:                        9.1    10.09 )-----------( 233      ( 2022 23:58 )             27.7     01-25    128<L>  |  95<L>  |  24<H>  ----------------------------<  279<H>  4.1   |  23  |  1.53<H>    Ca    8.9      2022 10:02  Phos  2.5     -  Mg     1.7         TPro  8.3<H>  /  Alb  3.7  /  TBili  0.1<L>  /  DBili  x   /  AST  18  /  ALT  17  /  AlkPhos  114  -    PT/INR - ( 2022 23:58 )   PT: 12.4 sec;   INR: 1.04          PTT - ( 2022 23:58 )  PTT:31.3 sec  Urinalysis Basic - ( 2022 19:08 )    Color: Yellow / Appearance: Clear / S.010 / pH: x  Gluc: x / Ketone: NEGATIVE  / Bili: NEGATIVE / Urobili: 0.2 E.U./dL   Blood: x / Protein: NEGATIVE mg/dL / Nitrite: NEGATIVE   Leuk Esterase: NEGATIVE / RBC: < 5 /HPF / WBC < 5 /HPF   Sq Epi: x / Non Sq Epi: 5-10 /HPF / Bacteria: Present /HPF      COVID-19 PCR: NotDetec (2022 17:16)  COVID-19 PCR: NotDetec (2022 08:44)  COVID-19 PCR: NotDetec (2022 06:28)  COVID-19 PCR: NotDetec (2022 22:47)  COVID-19 PCR: NotDetec (10 Oct 2021 12:52)      Radiology: Reviewed ******INCOMPLETE******    MICU Progress Note  Wellington Gamez, PGY-1  Pager: [phone]    ******TRANSFER NOTE FROM MICU TO [facility]******    Patient is a 62y old  Male who presents with a chief complaint of acute renal failure (2022 11:45)    HOSPITAL COURSE:      OVERNIGHT EVENTS/INTERVAL HPI: As per night team, no overnight events. Patient seen and examined at bedside. Offers no complaints at this time. Feels better after his dialysis session yesterday.    REVIEW OF SYSTEMS:  All other review of systems is negative unless indicated above.    OBJECTIVE:  T(C): 36.7 (22 @ 09:41), Max: 37.2 (22 @ 19:09)  HR: 79 (22 @ 14:00) (55 - 80)  BP: 133/60 (22 @ 14:00) (106/57 - 176/87)  RR: 14 (22 @ 14:00) (11 - 21)  SpO2: 100% (22 @ 14:00) (96% - 100%)  Daily Height in cm: 167.64 (2022 16:19)    Daily Weight in k.8 (2022 04:30)    Physical Exam:  General: in no acute distress  Eyes: EOMI intact bilaterally. Anicteric sclerae, moist conjunctivae  HENT: Moist mucous membranes  Neck: Trachea midline, supple  Lungs: CTA B/L. No wheezes, rales, or ronchi  Cardiovascular: RRR. No murmurs, rubs, or gallops  Abdomen: Soft, non-tender non-distended; No rebound or guarding  Extremities: Normal range of motion, No clubbing, cyanosis or edema  MSK: No midline bony tenderness. No CVA tenderness bilaterally  Neurological: Alert and oriented x3  Skin: Warm and dry. No obvious rash     Medications:  MEDICATIONS  (STANDING):  amLODIPine   Tablet 10 milliGRAM(s) Oral every 24 hours  atorvastatin 10 milliGRAM(s) Oral at bedtime  chlorhexidine 2% Cloths 1 Application(s) Topical <User Schedule>  dextrose 40% Gel 15 Gram(s) Oral once  dextrose 5%. 1000 milliLiter(s) (50 mL/Hr) IV Continuous <Continuous>  dextrose 5%. 1000 milliLiter(s) (100 mL/Hr) IV Continuous <Continuous>  dextrose 5%. 1000 milliLiter(s) (1000 mL/Hr) IV Continuous <Continuous>  dextrose 50% Injectable 25 Gram(s) IV Push once  dextrose 50% Injectable 12.5 Gram(s) IV Push once  dextrose 50% Injectable 25 Gram(s) IV Push once  DULoxetine 30 milliGRAM(s) Oral daily  ferrous    sulfate 325 milliGRAM(s) Oral daily  glucagon  Injectable 1 milliGRAM(s) IntraMuscular once  heparin   Injectable 5000 Unit(s) SubCutaneous every 8 hours  hydrALAZINE 50 milliGRAM(s) Oral every 8 hours  insulin lispro (ADMELOG) corrective regimen sliding scale   SubCutaneous Before meals and at bedtime  levothyroxine 50 MICROGram(s) Oral daily  pantoprazole    Tablet 40 milliGRAM(s) Oral before breakfast  tamsulosin 0.4 milliGRAM(s) Oral at bedtime    MEDICATIONS  (PRN):  acetaminophen     Tablet .. 650 milliGRAM(s) Oral every 6 hours PRN Temp greater or equal to 38C (100.4F), Mild Pain (1 - 3)      Labs:                        9.1    10.09 )-----------( 233      ( 2022 23:58 )             27.7     01-25    128<L>  |  95<L>  |  24<H>  ----------------------------<  279<H>  4.1   |  23  |  1.53<H>    Ca    8.9      2022 10:02  Phos  2.5     -  Mg     1.7     -    TPro  8.3<H>  /  Alb  3.7  /  TBili  0.1<L>  /  DBili  x   /  AST  18  /  ALT  17  /  AlkPhos  114  -24    PT/INR - ( 2022 23:58 )   PT: 12.4 sec;   INR: 1.04          PTT - ( 2022 23:58 )  PTT:31.3 sec  Urinalysis Basic - ( 2022 19:08 )    Color: Yellow / Appearance: Clear / S.010 / pH: x  Gluc: x / Ketone: NEGATIVE  / Bili: NEGATIVE / Urobili: 0.2 E.U./dL   Blood: x / Protein: NEGATIVE mg/dL / Nitrite: NEGATIVE   Leuk Esterase: NEGATIVE / RBC: < 5 /HPF / WBC < 5 /HPF   Sq Epi: x / Non Sq Epi: 5-10 /HPF / Bacteria: Present /HPF      COVID-19 PCR: NotDetec (2022 17:16)  COVID-19 PCR: NotDetec (2022 08:44)  COVID-19 PCR: NotDetec (2022 06:28)  COVID-19 PCR: NotDetec (2022 22:47)  COVID-19 PCR: NotDetec (10 Oct 2021 12:52)      Radiology: Reviewed ******INCOMPLETE******    MICU Progress Note  Wellington Gamez, PGY-1  Pager: [phone]    ******TRANSFER NOTE FROM MICU TO [facility]******    Patient is a 62y old  Male who presents with a chief complaint of acute renal failure (2022 11:45)    HOSPITAL COURSE:  62M with HTN, DM c/b neuropathy, EtOH liver cirrhosis, toxic megacolon c/b pneumatosis s/p colectomy/ileostomy, and multiple admissions for ARF w/ hyperkalemia (-22)    (most recently -2022, requiring HD), initially presenting to [facility] on  for generalized weakness since discharge, found to have acute renal failure with hyperkalemia and EKG changes and admitted to the ICU for emergent dialysis. In ED given D5+insulin, lokelma, bicarb, albuterol, calcium gluconate. Transferred to [facility] ICU for emergent dialysis. In MICU, patient remained hemodynamically stable, electrolyte disturbances improved after dialysis session.  After last admission patient was thought to recover renal function sufficiently to no longer require ongoing HD. Was discharged to shelter. Of note, pt also admitted 2021-2021 for MEG c/b hyperkalemia, responsive to medical therapy and not requiring hemodialysis.     OVERNIGHT EVENTS/INTERVAL HPI: As per night team, no overnight events. Patient seen and examined at bedside. Offers no complaints at this time. Feels better after his dialysis session yesterday.    REVIEW OF SYSTEMS:  All other review of systems is negative unless indicated above.    OBJECTIVE:  T(C): 36.7 (22 @ 09:41), Max: 37.2 (22 @ 19:09)  HR: 79 (22 @ 14:00) (55 - 80)  BP: 133/60 (22 @ 14:00) (106/57 - 176/87)  RR: 14 (22 @ 14:00) (11 - 21)  SpO2: 100% (22 @ 14:00) (96% - 100%)  Daily Height in cm: 167.64 (2022 16:19)    Daily Weight in k.8 (2022 04:30)    Physical Exam:  General: in no acute distress  Eyes: EOMI intact bilaterally. Anicteric sclerae, moist conjunctivae  HENT: Moist mucous membranes  Neck: Trachea midline, supple  Lungs: CTA B/L. No wheezes, rales, or ronchi  Cardiovascular: RRR. No murmurs, rubs, or gallops  Abdomen: Soft, non-tender non-distended; No rebound or guarding  Extremities: Normal range of motion, No clubbing, cyanosis or edema  MSK: No midline bony tenderness. No CVA tenderness bilaterally  Neurological: Alert and oriented x3  Skin: Warm and dry. No obvious rash     Medications:  MEDICATIONS  (STANDING):  amLODIPine   Tablet 10 milliGRAM(s) Oral every 24 hours  atorvastatin 10 milliGRAM(s) Oral at bedtime  chlorhexidine 2% Cloths 1 Application(s) Topical <User Schedule>  dextrose 40% Gel 15 Gram(s) Oral once  dextrose 5%. 1000 milliLiter(s) (50 mL/Hr) IV Continuous <Continuous>  dextrose 5%. 1000 milliLiter(s) (100 mL/Hr) IV Continuous <Continuous>  dextrose 5%. 1000 milliLiter(s) (1000 mL/Hr) IV Continuous <Continuous>  dextrose 50% Injectable 25 Gram(s) IV Push once  dextrose 50% Injectable 12.5 Gram(s) IV Push once  dextrose 50% Injectable 25 Gram(s) IV Push once  DULoxetine 30 milliGRAM(s) Oral daily  ferrous    sulfate 325 milliGRAM(s) Oral daily  glucagon  Injectable 1 milliGRAM(s) IntraMuscular once  heparin   Injectable 5000 Unit(s) SubCutaneous every 8 hours  hydrALAZINE 50 milliGRAM(s) Oral every 8 hours  insulin lispro (ADMELOG) corrective regimen sliding scale   SubCutaneous Before meals and at bedtime  levothyroxine 50 MICROGram(s) Oral daily  pantoprazole    Tablet 40 milliGRAM(s) Oral before breakfast  tamsulosin 0.4 milliGRAM(s) Oral at bedtime    MEDICATIONS  (PRN):  acetaminophen     Tablet .. 650 milliGRAM(s) Oral every 6 hours PRN Temp greater or equal to 38C (100.4F), Mild Pain (1 - 3)      Labs:                        9.1    10.09 )-----------( 233      ( 2022 23:58 )             27.7     01-    128<L>  |  95<L>  |  24<H>  ----------------------------<  279<H>  4.1   |  23  |  1.53<H>    Ca    8.9      2022 10:02  Phos  2.5       Mg     1.7         TPro  8.3<H>  /  Alb  3.7  /  TBili  0.1<L>  /  DBili  x   /  AST  18  /  ALT  17  /  AlkPhos  114      PT/INR - ( 2022 23:58 )   PT: 12.4 sec;   INR: 1.04          PTT - ( 2022 23:58 )  PTT:31.3 sec  Urinalysis Basic - ( 2022 19:08 )    Color: Yellow / Appearance: Clear / S.010 / pH: x  Gluc: x / Ketone: NEGATIVE  / Bili: NEGATIVE / Urobili: 0.2 E.U./dL   Blood: x / Protein: NEGATIVE mg/dL / Nitrite: NEGATIVE   Leuk Esterase: NEGATIVE / RBC: < 5 /HPF / WBC < 5 /HPF   Sq Epi: x / Non Sq Epi: 5-10 /HPF / Bacteria: Present /HPF      COVID-19 PCR: NotDetec (2022 17:16)  COVID-19 PCR: NotDetec (2022 08:44)  COVID-19 PCR: NotDetec (2022 06:28)  COVID-19 PCR: NotDetec (2022 22:47)  COVID-19 PCR: NotDetec (10 Oct 2021 12:52)      Radiology: Reviewed ******INCOMPLETE******    MICU Progress Note  Wellington Gamez, PGY-1  Pager: [phone]    ******TRANSFER NOTE FROM MICU TO [facility]******    Patient is a 62y old  Male who presents with a chief complaint of acute renal failure (2022 11:45)    HOSPITAL COURSE:  62M with HTN, DM c/b neuropathy, EtOH liver cirrhosis, toxic megacolon c/b pneumatosis s/p colectomy/ileostomy, and multiple admissions for ARF w/ hyperkalemia (-21 and -22), presenting to [facility] for generalized weakness, found to have acute renal failure with hyperK and EKG changes, [facility] In [facility] ED, pt received D5+insulin, lokelma, bicarb, albuterol, calcium gluconate, and then transferred [facility]. [facility] remained hemodynamically stable, received emergent HD, with improvement in electrolyte derangements. Nephrology following for rapid correction of Na from 114 -> 135 in 11 hours. D5W bolus started and 2mcg DDAVP given. 6 hour repeat showed Na 128. Next repeat showed increased Na 130. 4mcg DDAVP given. Continuing to trend. Patient is stable for stepdown to F.    OVERNIGHT EVENTS/INTERVAL HPI: As per night team, no overnight events. Patient seen and examined at bedside. Offers no complaints at this time. Feels better after his dialysis session yesterday.    REVIEW OF SYSTEMS:  All other review of systems is negative unless indicated above.    OBJECTIVE:  T(C): 36.7 (22 @ 09:41), Max: 37.2 (22 @ 19:09)  HR: 79 (22 @ 14:00) (55 - 80)  BP: 133/60 (22 @ 14:00) (106/57 - 176/87)  RR: 14 (22 @ 14:00) (11 - 21)  SpO2: 100% (22 @ 14:00) (96% - 100%)  Daily Height in cm: 167.64 (2022 16:19)    Daily Weight in k.8 (2022 04:30)    Physical Exam:  General: in no acute distress  Eyes: EOMI intact bilaterally. Anicteric sclerae, moist conjunctivae  HENT: Moist mucous membranes  Neck: Trachea midline, supple  Lungs: CTA B/L. No wheezes, rales, or ronchi  Cardiovascular: RRR. No murmurs, rubs, or gallops  Abdomen: Soft, non-tender non-distended; No rebound or guarding  Extremities: Normal range of motion, No clubbing, cyanosis or edema  MSK: No midline bony tenderness. No CVA tenderness bilaterally  Neurological: Alert and oriented x3  Skin: Warm and dry. No obvious rash     Medications:  MEDICATIONS  (STANDING):  amLODIPine   Tablet 10 milliGRAM(s) Oral every 24 hours  atorvastatin 10 milliGRAM(s) Oral at bedtime  chlorhexidine 2% Cloths 1 Application(s) Topical <User Schedule>  dextrose 40% Gel 15 Gram(s) Oral once  dextrose 5%. 1000 milliLiter(s) (50 mL/Hr) IV Continuous <Continuous>  dextrose 5%. 1000 milliLiter(s) (100 mL/Hr) IV Continuous <Continuous>  dextrose 5%. 1000 milliLiter(s) (1000 mL/Hr) IV Continuous <Continuous>  dextrose 50% Injectable 25 Gram(s) IV Push once  dextrose 50% Injectable 12.5 Gram(s) IV Push once  dextrose 50% Injectable 25 Gram(s) IV Push once  DULoxetine 30 milliGRAM(s) Oral daily  ferrous    sulfate 325 milliGRAM(s) Oral daily  glucagon  Injectable 1 milliGRAM(s) IntraMuscular once  heparin   Injectable 5000 Unit(s) SubCutaneous every 8 hours  hydrALAZINE 50 milliGRAM(s) Oral every 8 hours  insulin lispro (ADMELOG) corrective regimen sliding scale   SubCutaneous Before meals and at bedtime  levothyroxine 50 MICROGram(s) Oral daily  pantoprazole    Tablet 40 milliGRAM(s) Oral before breakfast  tamsulosin 0.4 milliGRAM(s) Oral at bedtime    MEDICATIONS  (PRN):  acetaminophen     Tablet .. 650 milliGRAM(s) Oral every 6 hours PRN Temp greater or equal to 38C (100.4F), Mild Pain (1 - 3)      Labs:                        9.1    10.09 )-----------( 233      ( 2022 23:58 )             27.7     01-25    128<L>  |  95<L>  |  24<H>  ----------------------------<  279<H>  4.1   |  23  |  1.53<H>    Ca    8.9      2022 10:02  Phos  2.5       Mg     1.7         TPro  8.3<H>  /  Alb  3.7  /  TBili  0.1<L>  /  DBili  x   /  AST  18  /  ALT  17  /  AlkPhos  114      PT/INR - ( 2022 23:58 )   PT: 12.4 sec;   INR: 1.04          PTT - ( 2022 23:58 )  PTT:31.3 sec  Urinalysis Basic - ( 2022 19:08 )    Color: Yellow / Appearance: Clear / S.010 / pH: x  Gluc: x / Ketone: NEGATIVE  / Bili: NEGATIVE / Urobili: 0.2 E.U./dL   Blood: x / Protein: NEGATIVE mg/dL / Nitrite: NEGATIVE   Leuk Esterase: NEGATIVE / RBC: < 5 /HPF / WBC < 5 /HPF   Sq Epi: x / Non Sq Epi: 5-10 /HPF / Bacteria: Present /HPF      COVID-19 PCR: NotDetec (2022 17:16)  COVID-19 PCR: NotDetec (2022 08:44)  COVID-19 PCR: NotDetec (2022 06:28)  COVID-19 PCR: NotDetec (2022 22:47)  COVID-19 PCR: NotDetec (10 Oct 2021 12:52)      Radiology: Reviewed ******INCOMPLETE******    MICU Progress Note  Wellington Gamez, PGY-1  Pager: [phone]    ******TRANSFER NOTE FROM MICU TO [facility]******    Patient is a 62y old  Male who presents with a chief complaint of acute renal failure (2022 11:45)    HOSPITAL COURSE:  62M with HTN, DM c/b neuropathy, EtOH liver cirrhosis, toxic megacolon c/b pneumatosis s/p colectomy/ileostomy, and multiple admissions for ARF w/ hyperkalemia (-21 and -22), presenting to [facility] for generalized weakness, found to have acute renal failure with hyperK and EKG changes, [facility], pt received D5+insulin, lokelma, bicarb, albuterol, calcium gluconate, and then [facility] hemodynamically stable, received emergent HD, with improvement in electrolyte derangements. Nephrology following for rapid correction of Na from 114 -> 135 in 11 hours. D5W bolus started and 2mcg DDAVP given. 6 hour repeat showed Na 128. Next repeat showed increased Na 130. 4mcg DDAVP given. Continuing to trend. Patient is stable for stepdown to F.    OVERNIGHT EVENTS/INTERVAL HPI: As per night team, no overnight events. Patient seen and examined at bedside. Offers no complaints at this time. Feels better after his dialysis session yesterday.    REVIEW OF SYSTEMS:  All other review of systems is negative unless indicated above.    OBJECTIVE:  T(C): 36.7 (22 @ 09:41), Max: 37.2 (22 @ 19:09)  HR: 79 (22 @ 14:00) (55 - 80)  BP: 133/60 (22 @ 14:00) (106/57 - 176/87)  RR: 14 (22 @ 14:00) (11 - 21)  SpO2: 100% (22 @ 14:00) (96% - 100%)  Daily Height in cm: 167.64 (2022 16:19)    Daily Weight in k.8 (2022 04:30)    Physical Exam:  General: in no acute distress, resting comfortably in bed  Eyes: EOMI intact bilaterally. Anicteric sclerae, moist conjunctivae  HENT: Moist mucous membranes  Neck: Trachea midline, supple  Lungs: CTA B/L. No wheezes, rales, or ronchi  Cardiovascular: RRR. No murmurs, rubs, or gallops  Abdomen: Soft, non-tender non-distended; No rebound or guarding. +BS. Ostomy bag in place with brown stool.  Extremities: Normal range of motion, No clubbing, cyanosis or edema  Neurological: Alert and oriented x3  Skin: Warm and dry. No obvious rash     Medications:  MEDICATIONS  (STANDING):  amLODIPine   Tablet 10 milliGRAM(s) Oral every 24 hours  atorvastatin 10 milliGRAM(s) Oral at bedtime  chlorhexidine 2% Cloths 1 Application(s) Topical <User Schedule>  dextrose 40% Gel 15 Gram(s) Oral once  dextrose 5%. 1000 milliLiter(s) (50 mL/Hr) IV Continuous <Continuous>  dextrose 5%. 1000 milliLiter(s) (100 mL/Hr) IV Continuous <Continuous>  dextrose 5%. 1000 milliLiter(s) (1000 mL/Hr) IV Continuous <Continuous>  dextrose 50% Injectable 25 Gram(s) IV Push once  dextrose 50% Injectable 12.5 Gram(s) IV Push once  dextrose 50% Injectable 25 Gram(s) IV Push once  DULoxetine 30 milliGRAM(s) Oral daily  ferrous    sulfate 325 milliGRAM(s) Oral daily  glucagon  Injectable 1 milliGRAM(s) IntraMuscular once  heparin   Injectable 5000 Unit(s) SubCutaneous every 8 hours  hydrALAZINE 50 milliGRAM(s) Oral every 8 hours  insulin lispro (ADMELOG) corrective regimen sliding scale   SubCutaneous Before meals and at bedtime  levothyroxine 50 MICROGram(s) Oral daily  pantoprazole    Tablet 40 milliGRAM(s) Oral before breakfast  tamsulosin 0.4 milliGRAM(s) Oral at bedtime    MEDICATIONS  (PRN):  acetaminophen     Tablet .. 650 milliGRAM(s) Oral every 6 hours PRN Temp greater or equal to 38C (100.4F), Mild Pain (1 - 3)      Labs:                        9.1    10.09 )-----------( 233      ( 2022 23:58 )             27.7     01-25    128<L>  |  95<L>  |  24<H>  ----------------------------<  279<H>  4.1   |  23  |  1.53<H>    Ca    8.9      2022 10:02  Phos  2.5     -  Mg     1.7         TPro  8.3<H>  /  Alb  3.7  /  TBili  0.1<L>  /  DBili  x   /  AST  18  /  ALT  17  /  AlkPhos  114      PT/INR - ( 2022 23:58 )   PT: 12.4 sec;   INR: 1.04          PTT - ( 2022 23:58 )  PTT:31.3 sec  Urinalysis Basic - ( 2022 19:08 )    Color: Yellow / Appearance: Clear / S.010 / pH: x  Gluc: x / Ketone: NEGATIVE  / Bili: NEGATIVE / Urobili: 0.2 E.U./dL   Blood: x / Protein: NEGATIVE mg/dL / Nitrite: NEGATIVE   Leuk Esterase: NEGATIVE / RBC: < 5 /HPF / WBC < 5 /HPF   Sq Epi: x / Non Sq Epi: 5-10 /HPF / Bacteria: Present /HPF      COVID-19 PCR: NotDetec (2022 17:16)  COVID-19 PCR: NotDetec (2022 08:44)  COVID-19 PCR: NotDetec (2022 06:28)  COVID-19 PCR: NotDetec (2022 22:47)  COVID-19 PCR: NotDetec (10 Oct 2021 12:52)      Radiology: Reviewed MICU Progress Note  Wellington Gamez, PGY-1  Pager: [phone]    ******TRANSFER NOTE FROM MICU TO [facility]******    Patient is a 62y old  Male who presents with a chief complaint of acute renal failure (2022 11:45)    HOSPITAL COURSE:  62M with HTN, DM c/b neuropathy, EtOH liver cirrhosis, toxic megacolon c/b pneumatosis s/p colectomy/ileostomy, and multiple admissions for ARF w/ hyperkalemia (-21 and -22), presenting to [facility] for generalized weakness, found to have acute renal failure with hyperK and EKG changes, admitted to MICU for emergent dialysis. In [facility] ED, pt received D5+insulin, lokelma, bicarb, albuterol, calcium gluconate, and then transferred to [facility] ICU. Upon arrival to MICU, pt remained hemodynamically stable, received emergent HD, with improvement in electrolyte derangements. Nephrology following for rapid correction of Na from 114 -> 135 in 11 hours. D5W bolus started and 2mcg DDAVP given. 6 hour repeat showed Na 128. Next repeat showed increased Na 130. 4mcg DDAVP given. Continuing to trend. Patient is stable for stepdown to F.    OVERNIGHT EVENTS/INTERVAL HPI: As per night team, no overnight events. Patient seen and examined at bedside. Offers no complaints at this time. Feels better after his dialysis session yesterday.    REVIEW OF SYSTEMS:  All other review of systems is negative unless indicated above.    OBJECTIVE:  T(C): 36.7 (22 @ 09:41), Max: 37.2 (22 @ 19:09)  HR: 79 (22 @ 14:00) (55 - 80)  BP: 133/60 (22 @ 14:00) (106/57 - 176/87)  RR: 14 (22 @ 14:00) (11 - 21)  SpO2: 100% (22 @ 14:00) (96% - 100%)  Daily Height in cm: 167.64 (2022 16:19)    Daily Weight in k.8 (2022 04:30)    Physical Exam:  General: in no acute distress, resting comfortably in bed  Eyes: EOMI intact bilaterally. Anicteric sclerae, moist conjunctivae  HENT: Moist mucous membranes  Neck: Trachea midline, supple  Lungs: CTA B/L. No wheezes, rales, or ronchi  Cardiovascular: RRR. No murmurs, rubs, or gallops  Abdomen: Soft, non-tender non-distended; No rebound or guarding. +BS. Ostomy bag in place with brown stool.  Extremities: Normal range of motion, No clubbing, cyanosis or edema  Neurological: Alert and oriented x3  Skin: Warm and dry. No obvious rash     Medications:  MEDICATIONS  (STANDING):  amLODIPine   Tablet 10 milliGRAM(s) Oral every 24 hours  atorvastatin 10 milliGRAM(s) Oral at bedtime  chlorhexidine 2% Cloths 1 Application(s) Topical <User Schedule>  dextrose 40% Gel 15 Gram(s) Oral once  dextrose 5%. 1000 milliLiter(s) (50 mL/Hr) IV Continuous <Continuous>  dextrose 5%. 1000 milliLiter(s) (100 mL/Hr) IV Continuous <Continuous>  dextrose 5%. 1000 milliLiter(s) (1000 mL/Hr) IV Continuous <Continuous>  dextrose 50% Injectable 25 Gram(s) IV Push once  dextrose 50% Injectable 12.5 Gram(s) IV Push once  dextrose 50% Injectable 25 Gram(s) IV Push once  DULoxetine 30 milliGRAM(s) Oral daily  ferrous    sulfate 325 milliGRAM(s) Oral daily  glucagon  Injectable 1 milliGRAM(s) IntraMuscular once  heparin   Injectable 5000 Unit(s) SubCutaneous every 8 hours  hydrALAZINE 50 milliGRAM(s) Oral every 8 hours  insulin lispro (ADMELOG) corrective regimen sliding scale   SubCutaneous Before meals and at bedtime  levothyroxine 50 MICROGram(s) Oral daily  pantoprazole    Tablet 40 milliGRAM(s) Oral before breakfast  tamsulosin 0.4 milliGRAM(s) Oral at bedtime    MEDICATIONS  (PRN):  acetaminophen     Tablet .. 650 milliGRAM(s) Oral every 6 hours PRN Temp greater or equal to 38C (100.4F), Mild Pain (1 - 3)      Labs:                        9.1    10.09 )-----------( 233      ( 2022 23:58 )             27.7     01-25    128<L>  |  95<L>  |  24<H>  ----------------------------<  279<H>  4.1   |  23  |  1.53<H>    Ca    8.9      2022 10:02  Phos  2.5     -  Mg     1.7         TPro  8.3<H>  /  Alb  3.7  /  TBili  0.1<L>  /  DBili  x   /  AST  18  /  ALT  17  /  AlkPhos  114      PT/INR - ( 2022 23:58 )   PT: 12.4 sec;   INR: 1.04          PTT - ( 2022 23:58 )  PTT:31.3 sec  Urinalysis Basic - ( 2022 19:08 )    Color: Yellow / Appearance: Clear / S.010 / pH: x  Gluc: x / Ketone: NEGATIVE  / Bili: NEGATIVE / Urobili: 0.2 E.U./dL   Blood: x / Protein: NEGATIVE mg/dL / Nitrite: NEGATIVE   Leuk Esterase: NEGATIVE / RBC: < 5 /HPF / WBC < 5 /HPF   Sq Epi: x / Non Sq Epi: 5-10 /HPF / Bacteria: Present /HPF      COVID-19 PCR: NotDetec (2022 17:16)  COVID-19 PCR: NotDetec (2022 08:44)  COVID-19 PCR: NotDetec (2022 06:28)  COVID-19 PCR: NotDetec (2022 22:47)  COVID-19 PCR: NotDetec (10 Oct 2021 12:52)      Radiology: Reviewed [place] website --- [email or web address] website --- [email or web address]

## 2022-01-25 NOTE — PROGRESS NOTE ADULT - PROBLEM SELECTOR PLAN 2
Patient with history of hypertension, home meds (from discharge 1/19): hydrALAZINE 50mg q8hrs, norvasc 10mg qd. Meds were initially held, started today in MICU, BP controlled.  - Continue home medications Na on admission 114, within 24 hrs corrected to 135. Goal Na by 5 pm tonight 120-122. Patient A&O x 4.   - Renal following appreciate recs,   - Per renal give additional liter of d5W, hold SSRI, f/u BMP 8pm

## 2022-01-25 NOTE — PROGRESS NOTE ADULT - SUBJECTIVE AND OBJECTIVE BOX
Patient is a 62y Male seen and evaluated at bedside. patient states that he is feeling tired and weak this AM; had HD last night for clearance with rapid overcorrection of Na and normalization of hyperkalemia and acidosis; uop 700cc/24 hours; remains hemodynamically stable       Meds:    acetaminophen     Tablet .. 650 every 6 hours PRN  amLODIPine   Tablet 10 every 24 hours  atorvastatin 10 at bedtime  chlorhexidine 2% Cloths 1 <User Schedule>  desmopressin Injectable 2 once  dextrose 40% Gel 15 once  dextrose 5%. 1000 <Continuous>  dextrose 5%. 1000 <Continuous>  dextrose 5%. 1000 <Continuous>  dextrose 50% Injectable 25 once  dextrose 50% Injectable 12.5 once  dextrose 50% Injectable 25 once  DULoxetine 30 daily  ferrous    sulfate 325 daily  glucagon  Injectable 1 once  heparin   Injectable 5000 every 8 hours  hydrALAZINE 50 every 8 hours  insulin lispro (ADMELOG) corrective regimen sliding scale  Before meals and at bedtime  levothyroxine 50 daily  pantoprazole    Tablet 40 before breakfast  tamsulosin 0.4 at bedtime      T(C): , Max: 37.2 (22 @ 19:09)  T(F): , Max: 98.9 (22 @ 19:09)  HR: 70 (22 @ 11:00)  BP: 143/65 (22 @ 11:00)  BP(mean): 94 (22 @ 11:00)  RR: 12 (22 @ 11:00)  SpO2: 100% (22 @ 11:00)  Wt(kg): --     07: @ 07:00  --------------------------------------------------------  IN: 400 mL / OUT: 1170 mL / NET: -770 mL     @ 07: @ 11:46  --------------------------------------------------------  IN: 1120 mL / OUT: 1000 mL / NET: 120 mL      Height (cm): 167.6 ( @ 16:19)  Weight (kg): 72.6 ( @ 16:19)  BMI (kg/m2): 25.8 ( @ 16:19)  BSA (m2): 1.82 ( @ 16:19)    Review of Systems:  all other ROS negative      PHYSICAL EXAM:  GENERAL: appears tired; frail  CHEST/LUNG: Clear to auscultation bilaterally no rales, rhonchi or wheezing   HEART: normal S1S2, RRR  ABDOMEN: Soft, Nontender, +BS,   EXTREMITIES: No clubbing, cyanosis, or edema   ACCESS: + IJ catheter       LABS:                        9.1    10.09 )-----------( 233      ( 2022 23:58 )             27.7         128<L>  |  95<L>  |  24<H>  ----------------------------<  279<H>  4.1   |  23  |  1.53<H>    Ca    8.9      2022 10:02  Phos  2.5       Mg     1.7         TPro  8.3<H>  /  Alb  3.7  /  TBili  0.1<L>  /  DBili  x   /  AST  18  /  ALT  17  /  AlkPhos  114      Osmolality, Serum: 283 mosm/kg [280 - 301] ( @ 10:02)    PT/INR - ( 2022 23:58 )   PT: 12.4 sec;   INR: 1.04          PTT - ( 2022 23:58 )  PTT:31.3 sec  Urinalysis Basic - ( 2022 19:08 )    Color: Yellow / Appearance: Clear / S.010 / pH: x  Gluc: x / Ketone: NEGATIVE  / Bili: NEGATIVE / Urobili: 0.2 E.U./dL   Blood: x / Protein: NEGATIVE mg/dL / Nitrite: NEGATIVE   Leuk Esterase: NEGATIVE / RBC: < 5 /HPF / WBC < 5 /HPF   Sq Epi: x / Non Sq Epi: 5-10 /HPF / Bacteria: Present /HPF            RADIOLOGY & ADDITIONAL STUDIES:

## 2022-01-25 NOTE — PROGRESS NOTE ADULT - PROBLEM SELECTOR PLAN 3
Patient with history of HLD, home meds (from discharge 1/19): atorvastatin 10mg  - Continue atorvastatin 10mg qhs k on admission 7.6, EKG with peaked T waves, s/p D5+insulin, lokelma. K now improved to 4.6 after dialysis.   - Continue to monitor

## 2022-01-25 NOTE — PROGRESS NOTE ADULT - PROBLEM SELECTOR PLAN 7
Patient with history of alcohol overuse and cirrhosis.  Currently not decompensated.  - No acute interventions

## 2022-01-25 NOTE — PROGRESS NOTE ADULT - PROBLEM SELECTOR PLAN 11
F: none, encourage PO intake  E: on HD, caution with repletion  N: renal diet, consistent carb, dash    DVT: heparin subcut  GI: none    Code Status: full code  Dispo: [facility]

## 2022-01-25 NOTE — PROGRESS NOTE ADULT - SUBJECTIVE AND OBJECTIVE BOX
Transfer acceptance from MICU to [facility]    Hospital Course  62M with HTN, DM c/b neuropathy, EtOH liver cirrhosis, toxic megacolon c/b pneumatosis s/p colectomy/ileostomy, and recent admission -2022 for weakness during which  found to have acute renal failure requiring hemodialysis, initially presenting to [facility] with generalized weakess.    Regarding last admission, found to have ARF 2/2 ATN. Had been on ICU and telemetry from altered mental status 2/2 renal failure and intermittent hypotension 2/2 HD. HD catheter had been removed when pt recovered urinary output, thought to have recovered renal function sufficiently to no longer require HD as outpatient. Course c/b urinary tract infection treated with ceftriaxone. Was discharged to shelter.   Of note, pt also admitted 2021-2021 for MEG c/b hyperkalemia, responsive to medical therapy and not requiring hemodialysis.    At [facility],  Vitals: T 98.2, HR 61, /68, RR 17, O2sat 98% on room air  Labs: Hb 9.6 | Na 114, Cl 92, K 7.6, HCO3 8, BUN 87, Cr 4.39, glucose 354  Imaging:    EKG: left axis deviation, sinus bradycardia at HR 51, peaked T waves    CXR: no acute infiltrates or consolidations  ED Course: given D5+insulin, lokelma, bicarb, albuterol, calcium gluconate. Transferred to [facility] ICU for emergent dialysis.    Upon arrival to [facility] ICU, renal consulted for emergent dialysis. R IJ HD catheter placed and intermittent HD initiated overnight.            Interval      INTERVAL HPI/OVERNIGHT EVENTS:  Patient was seen and examined at bedside. As per nurse and patient, no o/n events, patient resting comfortably. No complaints at this time. Patient denies: fever, chills, dizziness, weakness, HA, Changes in vision, CP, palpitations, SOB, cough, N/V/D/C, dysuria, changes in bowel movements, LE edema. ROS otherwise negative.    VITAL SIGNS:  T(F): 98.2 (22 @ 14:30)  HR: 77 (22 @ 16:00)  BP: 126/60 (22 @ 16:00)  RR: 14 (22 @ 16:00)  SpO2: 99% (22 @ 16:00)  Wt(kg): --    PHYSICAL EXAM:    Constitutional: WDWN, NAD  HEENT: PERRL, EOMI, sclera non-icteric, neck supple, trachea midline, no masses, no JVD, MMM, good dentition  Respiratory: CTA b/l, good air entry b/l, no wheezing, no rhonchi, no rales, without accessory muscle use and no intercostal retractions  Cardiovascular: RRR, normal S1S2, no M/R/G  Gastrointestinal: soft, NTND, no masses palpable, BS normal  Extremities: Warm, well perfused, pulses equal bilateral upper and lower extremities, no edema, no clubbing  Neurological: AAOx3, CN Grossly intact  Skin: Normal temperature, warm, dry    MEDICATIONS  (STANDING):  amLODIPine   Tablet 10 milliGRAM(s) Oral every 24 hours  atorvastatin 10 milliGRAM(s) Oral at bedtime  chlorhexidine 2% Cloths 1 Application(s) Topical <User Schedule>  desmopressin Injectable 4 MICROGram(s) IV Push once  dextrose 40% Gel 15 Gram(s) Oral once  dextrose 5%. 1000 milliLiter(s) (50 mL/Hr) IV Continuous <Continuous>  dextrose 5%. 2000 milliLiter(s) (500 mL/Hr) IV Continuous <Continuous>  dextrose 5%. 1000 milliLiter(s) (100 mL/Hr) IV Continuous <Continuous>  dextrose 5%. 1000 milliLiter(s) (1000 mL/Hr) IV Continuous <Continuous>  dextrose 50% Injectable 25 Gram(s) IV Push once  dextrose 50% Injectable 12.5 Gram(s) IV Push once  dextrose 50% Injectable 25 Gram(s) IV Push once  DULoxetine 30 milliGRAM(s) Oral daily  ferrous    sulfate 325 milliGRAM(s) Oral daily  glucagon  Injectable 1 milliGRAM(s) IntraMuscular once  heparin   Injectable 5000 Unit(s) SubCutaneous every 8 hours  hydrALAZINE 50 milliGRAM(s) Oral every 8 hours  insulin lispro (ADMELOG) corrective regimen sliding scale   SubCutaneous Before meals and at bedtime  levothyroxine 50 MICROGram(s) Oral daily  pantoprazole    Tablet 40 milliGRAM(s) Oral before breakfast  tamsulosin 0.4 milliGRAM(s) Oral at bedtime    MEDICATIONS  (PRN):  acetaminophen     Tablet .. 650 milliGRAM(s) Oral every 6 hours PRN Temp greater or equal to 38C (100.4F), Mild Pain (1 - 3)      Allergies    No Known Allergies    Intolerances        LABS:                        9.1    10.09 )-----------( 233      ( 2022 23:58 )             27.7     -25    130<L>  |  97  |  26<H>  ----------------------------<  174<H>  4.6   |  23  |  1.42<H>    Ca    8.4      2022 15:04  Phos  2.5     -  Mg     1.7         TPro  8.3<H>  /  Alb  3.7  /  TBili  0.1<L>  /  DBili  x   /  AST  18  /  ALT  17  /  AlkPhos  114  -    PT/INR - ( 2022 23:58 )   PT: 12.4 sec;   INR: 1.04          PTT - ( 2022 23:58 )  PTT:31.3 sec  Urinalysis Basic - ( 2022 19:08 )    Color: Yellow / Appearance: Clear / S.010 / pH: x  Gluc: x / Ketone: NEGATIVE  / Bili: NEGATIVE / Urobili: 0.2 E.U./dL   Blood: x / Protein: NEGATIVE mg/dL / Nitrite: NEGATIVE   Leuk Esterase: NEGATIVE / RBC: < 5 /HPF / WBC < 5 /HPF   Sq Epi: x / Non Sq Epi: 5-10 /HPF / Bacteria: Present /HPF          RADIOLOGY & ADDITIONAL TESTS:  Reviewed Transfer acceptance from MICU to [facility]    Hospital Course  62M with HTN, DM c/b neuropathy, EtOH liver cirrhosis, toxic megacolon c/b pneumatosis s/p colectomy/ileostomy, and multiple admissions for ARF w/ hyperkalemia (K 7.6) (most recently -2022, requiring HD), initially presenting to [facility] on  for generalized weakness since discharge, found to have acute renal failure with hyperkalemia and EKG changes and admitted to the ICU for emergent dialysis. In ED given D5+insulin, lokelma, bicarb, albuterol, calcium gluconate. Transferred to [facility] ICU for emergent dialysis.  In MICU patient remained hemodynamically stable, electrolyte disturbances improved.  After last admission patient was thought to recover renal function sufficiently to no longer require ongoing HD. Was discharged to shelter. Of note, pt also admitted 2021-2021 for MEG c/b hyperkalemia, responsive to medical therapy and not requiring hemodialysis.     INTERVAL HPI/OVERNIGHT EVENTS:  Patient was seen and examined at bedside. Patient resting comfortably. No complaints at this time. Patient denies: fever, chills, dizziness, weakness, HA, Changes in vision, CP, palpitations, SOB, cough, N/V/D/C, dysuria, changes in bowel movements, LE edema. ROS otherwise negative.    VITAL SIGNS:  T(F): 98.2 (22 @ 14:30)  HR: 77 (22 @ 16:00)  BP: 126/60 (22 @ 16:00)  RR: 14 (22 @ 16:00)  SpO2: 99% (22 @ 16:00)  Wt(kg): --    PHYSICAL EXAM:  Constitutional: WDWN, NAD  HEENT: PERRL, EOMI, sclera non-icteric, neck supple, trachea midline, no masses, no JVD, MMM, good dentition  Respiratory: CTA b/l, good air entry b/l, no wheezing, no rhonchi, no rales, without accessory muscle use and no intercostal retractions  Cardiovascular: RRR, normal S1S2, no M/R/G  Gastrointestinal: soft, NTND, no masses palpable, BS normal  Extremities: Warm, well perfused, pulses equal bilateral upper and lower extremities, no edema, no clubbing  Neurological: AAOx3, CN Grossly intact  Skin: Normal temperature, warm, dry    MEDICATIONS  (STANDING):  amLODIPine   Tablet 10 milliGRAM(s) Oral every 24 hours  atorvastatin 10 milliGRAM(s) Oral at bedtime  chlorhexidine 2% Cloths 1 Application(s) Topical <User Schedule>  desmopressin Injectable 4 MICROGram(s) IV Push once  dextrose 40% Gel 15 Gram(s) Oral once  dextrose 5%. 1000 milliLiter(s) (50 mL/Hr) IV Continuous <Continuous>  dextrose 5%. 2000 milliLiter(s) (500 mL/Hr) IV Continuous <Continuous>  dextrose 5%. 1000 milliLiter(s) (100 mL/Hr) IV Continuous <Continuous>  dextrose 5%. 1000 milliLiter(s) (1000 mL/Hr) IV Continuous <Continuous>  dextrose 50% Injectable 25 Gram(s) IV Push once  dextrose 50% Injectable 12.5 Gram(s) IV Push once  dextrose 50% Injectable 25 Gram(s) IV Push once  DULoxetine 30 milliGRAM(s) Oral daily  ferrous    sulfate 325 milliGRAM(s) Oral daily  glucagon  Injectable 1 milliGRAM(s) IntraMuscular once  heparin   Injectable 5000 Unit(s) SubCutaneous every 8 hours  hydrALAZINE 50 milliGRAM(s) Oral every 8 hours  insulin lispro (ADMELOG) corrective regimen sliding scale   SubCutaneous Before meals and at bedtime  levothyroxine 50 MICROGram(s) Oral daily  pantoprazole    Tablet 40 milliGRAM(s) Oral before breakfast  tamsulosin 0.4 milliGRAM(s) Oral at bedtime    MEDICATIONS  (PRN):  acetaminophen     Tablet .. 650 milliGRAM(s) Oral every 6 hours PRN Temp greater or equal to 38C (100.4F), Mild Pain (1 - 3)      Allergies    No Known Allergies    Intolerances        LABS:                        9.1    10.09 )-----------( 233      ( 2022 23:58 )             27.7     01-    130<L>  |  97  |  26<H>  ----------------------------<  174<H>  4.6   |  23  |  1.42<H>    Ca    8.4      2022 15:04  Phos  2.5     -  Mg     1.7     -    TPro  8.3<H>  /  Alb  3.7  /  TBili  0.1<L>  /  DBili  x   /  AST  18  /  ALT  17  /  AlkPhos  114  -    PT/INR - ( 2022 23:58 )   PT: 12.4 sec;   INR: 1.04          PTT - ( 2022 23:58 )  PTT:31.3 sec  Urinalysis Basic - ( 2022 19:08 )    Color: Yellow / Appearance: Clear / S.010 / pH: x  Gluc: x / Ketone: NEGATIVE  / Bili: NEGATIVE / Urobili: 0.2 E.U./dL   Blood: x / Protein: NEGATIVE mg/dL / Nitrite: NEGATIVE   Leuk Esterase: NEGATIVE / RBC: < 5 /HPF / WBC < 5 /HPF   Sq Epi: x / Non Sq Epi: 5-10 /HPF / Bacteria: Present /HPF          RADIOLOGY & ADDITIONAL TESTS:  Reviewed Transfer acceptance from MICU to [facility]    Hospital Course  62M with HTN, DM c/b neuropathy, EtOH liver cirrhosis, toxic megacolon c/b pneumatosis s/p colectomy/ileostomy, and multiple admissions for ARF w/ hyperkalemia (K 7.6) (most recently -2022, requiring HD), initially presenting to [facility] on  for generalized weakness since discharge, found to have acute renal failure with hyperkalemia and EKG changes and admitted to the ICU for emergent dialysis. In ED given D5+insulin, lokelma, bicarb, albuterol, calcium gluconate. Transferred to [facility] ICU for emergent dialysis.  In MICU patient remained hemodynamically stable, electrolyte disturbances improved.  After last admission patient was thought to recover renal function sufficiently to no longer require ongoing HD. Was discharged to shelter. Of note, pt also admitted 2021-2021 for MEG c/b hyperkalemia, responsive to medical therapy and not requiring hemodialysis.     INTERVAL HPI/OVERNIGHT EVENTS:  Patient was seen and examined at bedside. Patient resting comfortably. No complaints at this time. Patient denies: fever, chills, dizziness, weakness, HA, Changes in vision, CP, palpitations, SOB, cough, N/V/D/C, dysuria, changes in bowel movements, LE edema. ROS otherwise negative.    VITAL SIGNS:  T(F): 98.2 (22 @ 14:30)  HR: 77 (22 @ 16:00)  BP: 126/60 (22 @ 16:00)  RR: 14 (22 @ 16:00)  SpO2: 99% (22 @ 16:00)  Wt(kg): --    PHYSICAL EXAM:  Constitutional: resting comfortably, no acute distress   HEENT: PERRL, EOMI, sclera non-icteric, neck supple, right IJ catheter in place.  Respiratory: CTA b/l, good air entry b/l, no rales or crackles   Cardiovascular: RRR, normal S1S2, no M/R/G  Gastrointestinal: soft, NTND, no masses palpable, BS normal, ostomy in place  Extremities: Warm, well perfused, pulses equal bilateral upper and lower extremities, no edema, no clubbing  Neurological: AAOx3, CN Grossly intact  Skin: Normal temperature, warm, dry    MEDICATIONS  (STANDING):  amLODIPine   Tablet 10 milliGRAM(s) Oral every 24 hours  atorvastatin 10 milliGRAM(s) Oral at bedtime  chlorhexidine 2% Cloths 1 Application(s) Topical <User Schedule>  desmopressin Injectable 4 MICROGram(s) IV Push once  dextrose 40% Gel 15 Gram(s) Oral once  dextrose 5%. 1000 milliLiter(s) (50 mL/Hr) IV Continuous <Continuous>  dextrose 5%. 2000 milliLiter(s) (500 mL/Hr) IV Continuous <Continuous>  dextrose 5%. 1000 milliLiter(s) (100 mL/Hr) IV Continuous <Continuous>  dextrose 5%. 1000 milliLiter(s) (1000 mL/Hr) IV Continuous <Continuous>  dextrose 50% Injectable 25 Gram(s) IV Push once  dextrose 50% Injectable 12.5 Gram(s) IV Push once  dextrose 50% Injectable 25 Gram(s) IV Push once  DULoxetine 30 milliGRAM(s) Oral daily  ferrous    sulfate 325 milliGRAM(s) Oral daily  glucagon  Injectable 1 milliGRAM(s) IntraMuscular once  heparin   Injectable 5000 Unit(s) SubCutaneous every 8 hours  hydrALAZINE 50 milliGRAM(s) Oral every 8 hours  insulin lispro (ADMELOG) corrective regimen sliding scale   SubCutaneous Before meals and at bedtime  levothyroxine 50 MICROGram(s) Oral daily  pantoprazole    Tablet 40 milliGRAM(s) Oral before breakfast  tamsulosin 0.4 milliGRAM(s) Oral at bedtime    MEDICATIONS  (PRN):  acetaminophen     Tablet .. 650 milliGRAM(s) Oral every 6 hours PRN Temp greater or equal to 38C (100.4F), Mild Pain (1 - 3)      Allergies    No Known Allergies    Intolerances        LABS:                        9.1    10.09 )-----------( 233      ( 2022 23:58 )             27.7     01-25    130<L>  |  97  |  26<H>  ----------------------------<  174<H>  4.6   |  23  |  1.42<H>    Ca    8.4      2022 15:04  Phos  2.5     -25  Mg     1.7     -25    TPro  8.3<H>  /  Alb  3.7  /  TBili  0.1<L>  /  DBili  x   /  AST  18  /  ALT  17  /  AlkPhos  114  -    PT/INR - ( 2022 23:58 )   PT: 12.4 sec;   INR: 1.04          PTT - ( 2022 23:58 )  PTT:31.3 sec  Urinalysis Basic - ( 2022 19:08 )    Color: Yellow / Appearance: Clear / S.010 / pH: x  Gluc: x / Ketone: NEGATIVE  / Bili: NEGATIVE / Urobili: 0.2 E.U./dL   Blood: x / Protein: NEGATIVE mg/dL / Nitrite: NEGATIVE   Leuk Esterase: NEGATIVE / RBC: < 5 /HPF / WBC < 5 /HPF   Sq Epi: x / Non Sq Epi: 5-10 /HPF / Bacteria: Present /HPF          RADIOLOGY & ADDITIONAL TESTS:  Reviewed

## 2022-01-25 NOTE — PROGRESS NOTE ADULT - PROBLEM SELECTOR PLAN 8
Patient with history of toxic megacolon c/b pneumatosis s/p colectomy/ileostomy. Ostomy output draining brown stool.  - No acute interventions

## 2022-01-25 NOTE — PROGRESS NOTE ADULT - PROBLEM SELECTOR PLAN 5
Patient with history of toxic megacolon c/b pneumatosis s/p colectomy/ileostomy. Ostomy output draining brown stool.  - No acute interventions Patient with history of hypertension, home meds (from discharge 1/19): hydrALAZINE 50mg q8hrs, norvasc 10mg qd. Meds were initially held, started today in MICU, BP controlled.  - Continue home medications

## 2022-01-26 DIAGNOSIS — R91.1 SOLITARY PULMONARY NODULE: ICD-10-CM

## 2022-01-26 DIAGNOSIS — Z91.89 OTHER SPECIFIED PERSONAL RISK FACTORS, NOT ELSEWHERE CLASSIFIED: ICD-10-CM

## 2022-01-26 LAB
ALBUMIN SERPL ELPH-MCNC: 3.5 G/DL — SIGNIFICANT CHANGE UP (ref 3.3–5)
ALP SERPL-CCNC: 78 U/L — SIGNIFICANT CHANGE UP (ref 40–120)
ALT FLD-CCNC: 10 U/L — SIGNIFICANT CHANGE UP (ref 10–45)
ANION GAP SERPL CALC-SCNC: 12 MMOL/L — SIGNIFICANT CHANGE UP (ref 5–17)
ANION GAP SERPL CALC-SCNC: 8 MMOL/L — SIGNIFICANT CHANGE UP (ref 5–17)
ANION GAP SERPL CALC-SCNC: 9 MMOL/L — SIGNIFICANT CHANGE UP (ref 5–17)
AST SERPL-CCNC: 11 U/L — SIGNIFICANT CHANGE UP (ref 10–40)
BASOPHILS # BLD AUTO: 0.02 K/UL — SIGNIFICANT CHANGE UP (ref 0–0.2)
BASOPHILS NFR BLD AUTO: 0.5 % — SIGNIFICANT CHANGE UP (ref 0–2)
BILIRUB SERPL-MCNC: 0.2 MG/DL — SIGNIFICANT CHANGE UP (ref 0.2–1.2)
BUN SERPL-MCNC: 23 MG/DL — SIGNIFICANT CHANGE UP (ref 7–23)
BUN SERPL-MCNC: 24 MG/DL — HIGH (ref 7–23)
BUN SERPL-MCNC: 24 MG/DL — HIGH (ref 7–23)
CALCIUM SERPL-MCNC: 8.4 MG/DL — SIGNIFICANT CHANGE UP (ref 8.4–10.5)
CALCIUM SERPL-MCNC: 8.8 MG/DL — SIGNIFICANT CHANGE UP (ref 8.4–10.5)
CALCIUM SERPL-MCNC: 8.9 MG/DL — SIGNIFICANT CHANGE UP (ref 8.4–10.5)
CHLORIDE SERPL-SCNC: 95 MMOL/L — LOW (ref 96–108)
CHLORIDE SERPL-SCNC: 97 MMOL/L — SIGNIFICANT CHANGE UP (ref 96–108)
CHLORIDE SERPL-SCNC: 98 MMOL/L — SIGNIFICANT CHANGE UP (ref 96–108)
CO2 SERPL-SCNC: 21 MMOL/L — LOW (ref 22–31)
CO2 SERPL-SCNC: 23 MMOL/L — SIGNIFICANT CHANGE UP (ref 22–31)
CO2 SERPL-SCNC: 25 MMOL/L — SIGNIFICANT CHANGE UP (ref 22–31)
CREAT ?TM UR-MCNC: 60 MG/DL — SIGNIFICANT CHANGE UP
CREAT SERPL-MCNC: 1.21 MG/DL — SIGNIFICANT CHANGE UP (ref 0.5–1.3)
CREAT SERPL-MCNC: 1.28 MG/DL — SIGNIFICANT CHANGE UP (ref 0.5–1.3)
CREAT SERPL-MCNC: 1.36 MG/DL — HIGH (ref 0.5–1.3)
EOSINOPHIL # BLD AUTO: 0.15 K/UL — SIGNIFICANT CHANGE UP (ref 0–0.5)
EOSINOPHIL NFR BLD AUTO: 4.1 % — SIGNIFICANT CHANGE UP (ref 0–6)
GLUCOSE BLDC GLUCOMTR-MCNC: 154 MG/DL — HIGH (ref 70–99)
GLUCOSE BLDC GLUCOMTR-MCNC: 161 MG/DL — HIGH (ref 70–99)
GLUCOSE BLDC GLUCOMTR-MCNC: 187 MG/DL — HIGH (ref 70–99)
GLUCOSE BLDC GLUCOMTR-MCNC: 286 MG/DL — HIGH (ref 70–99)
GLUCOSE SERPL-MCNC: 143 MG/DL — HIGH (ref 70–99)
GLUCOSE SERPL-MCNC: 171 MG/DL — HIGH (ref 70–99)
GLUCOSE SERPL-MCNC: 220 MG/DL — HIGH (ref 70–99)
HCT VFR BLD CALC: 23.9 % — LOW (ref 39–50)
HGB BLD-MCNC: 7.9 G/DL — LOW (ref 13–17)
IMM GRANULOCYTES NFR BLD AUTO: 0.5 % — SIGNIFICANT CHANGE UP (ref 0–1.5)
LYMPHOCYTES # BLD AUTO: 1.41 K/UL — SIGNIFICANT CHANGE UP (ref 1–3.3)
LYMPHOCYTES # BLD AUTO: 38.3 % — SIGNIFICANT CHANGE UP (ref 13–44)
MAGNESIUM SERPL-MCNC: 1.6 MG/DL — SIGNIFICANT CHANGE UP (ref 1.6–2.6)
MCHC RBC-ENTMCNC: 29.8 PG — SIGNIFICANT CHANGE UP (ref 27–34)
MCHC RBC-ENTMCNC: 33.1 GM/DL — SIGNIFICANT CHANGE UP (ref 32–36)
MCV RBC AUTO: 90.2 FL — SIGNIFICANT CHANGE UP (ref 80–100)
MONOCYTES # BLD AUTO: 0.48 K/UL — SIGNIFICANT CHANGE UP (ref 0–0.9)
MONOCYTES NFR BLD AUTO: 13 % — SIGNIFICANT CHANGE UP (ref 2–14)
NEUTROPHILS # BLD AUTO: 1.6 K/UL — LOW (ref 1.8–7.4)
NEUTROPHILS NFR BLD AUTO: 43.6 % — SIGNIFICANT CHANGE UP (ref 43–77)
NRBC # BLD: 0 /100 WBCS — SIGNIFICANT CHANGE UP (ref 0–0)
OSMOLALITY UR: 392 MOSM/KG — SIGNIFICANT CHANGE UP (ref 300–900)
PHOSPHATE SERPL-MCNC: 2 MG/DL — LOW (ref 2.5–4.5)
PLATELET # BLD AUTO: 141 K/UL — LOW (ref 150–400)
POTASSIUM SERPL-MCNC: 4.1 MMOL/L — SIGNIFICANT CHANGE UP (ref 3.5–5.3)
POTASSIUM SERPL-MCNC: 4.2 MMOL/L — SIGNIFICANT CHANGE UP (ref 3.5–5.3)
POTASSIUM SERPL-MCNC: 4.7 MMOL/L — SIGNIFICANT CHANGE UP (ref 3.5–5.3)
POTASSIUM SERPL-SCNC: 4.1 MMOL/L — SIGNIFICANT CHANGE UP (ref 3.5–5.3)
POTASSIUM SERPL-SCNC: 4.2 MMOL/L — SIGNIFICANT CHANGE UP (ref 3.5–5.3)
POTASSIUM SERPL-SCNC: 4.7 MMOL/L — SIGNIFICANT CHANGE UP (ref 3.5–5.3)
PROT SERPL-MCNC: 6.6 G/DL — SIGNIFICANT CHANGE UP (ref 6–8.3)
RBC # BLD: 2.65 M/UL — LOW (ref 4.2–5.8)
RBC # FLD: 15.2 % — HIGH (ref 10.3–14.5)
SODIUM SERPL-SCNC: 128 MMOL/L — LOW (ref 135–145)
SODIUM SERPL-SCNC: 130 MMOL/L — LOW (ref 135–145)
SODIUM SERPL-SCNC: 130 MMOL/L — LOW (ref 135–145)
SODIUM UR-SCNC: 46 MMOL/L — SIGNIFICANT CHANGE UP
WBC # BLD: 3.68 K/UL — LOW (ref 3.8–10.5)
WBC # FLD AUTO: 3.68 K/UL — LOW (ref 3.8–10.5)

## 2022-01-26 PROCEDURE — 71250 CT THORAX DX C-: CPT | Mod: 26

## 2022-01-26 PROCEDURE — 76775 US EXAM ABDO BACK WALL LIM: CPT | Mod: 26

## 2022-01-26 RX ORDER — GABAPENTIN 400 MG/1
100 CAPSULE ORAL ONCE
Refills: 0 | Status: COMPLETED | OUTPATIENT
Start: 2022-01-26 | End: 2022-01-26

## 2022-01-26 RX ORDER — LANOLIN ALCOHOL/MO/W.PET/CERES
5 CREAM (GRAM) TOPICAL ONCE
Refills: 0 | Status: COMPLETED | OUTPATIENT
Start: 2022-01-26 | End: 2022-01-26

## 2022-01-26 RX ORDER — ACETAMINOPHEN 500 MG
1000 TABLET ORAL ONCE
Refills: 0 | Status: COMPLETED | OUTPATIENT
Start: 2022-01-26 | End: 2022-01-26

## 2022-01-26 RX ORDER — GABAPENTIN 400 MG/1
100 CAPSULE ORAL THREE TIMES A DAY
Refills: 0 | Status: DISCONTINUED | OUTPATIENT
Start: 2022-01-26 | End: 2022-01-30

## 2022-01-26 RX ADMIN — INSULIN GLARGINE 3 UNIT(S): 100 INJECTION, SOLUTION SUBCUTANEOUS at 22:11

## 2022-01-26 RX ADMIN — Medication 1000 MILLIGRAM(S): at 17:15

## 2022-01-26 RX ADMIN — Medication 50 MILLIGRAM(S): at 21:29

## 2022-01-26 RX ADMIN — Medication 2: at 22:10

## 2022-01-26 RX ADMIN — Medication 650 MILLIGRAM(S): at 12:20

## 2022-01-26 RX ADMIN — Medication 400 MILLIGRAM(S): at 16:49

## 2022-01-26 RX ADMIN — Medication 2 UNIT(S): at 17:28

## 2022-01-26 RX ADMIN — Medication 2: at 12:20

## 2022-01-26 RX ADMIN — HEPARIN SODIUM 5000 UNIT(S): 5000 INJECTION INTRAVENOUS; SUBCUTANEOUS at 06:33

## 2022-01-26 RX ADMIN — AMLODIPINE BESYLATE 10 MILLIGRAM(S): 2.5 TABLET ORAL at 09:30

## 2022-01-26 RX ADMIN — GABAPENTIN 100 MILLIGRAM(S): 400 CAPSULE ORAL at 21:29

## 2022-01-26 RX ADMIN — HEPARIN SODIUM 5000 UNIT(S): 5000 INJECTION INTRAVENOUS; SUBCUTANEOUS at 15:04

## 2022-01-26 RX ADMIN — Medication 50 MILLIGRAM(S): at 15:04

## 2022-01-26 RX ADMIN — Medication 2: at 09:09

## 2022-01-26 RX ADMIN — Medication 6: at 17:28

## 2022-01-26 RX ADMIN — ATORVASTATIN CALCIUM 10 MILLIGRAM(S): 80 TABLET, FILM COATED ORAL at 21:29

## 2022-01-26 RX ADMIN — Medication 2 UNIT(S): at 12:21

## 2022-01-26 RX ADMIN — CHLORHEXIDINE GLUCONATE 1 APPLICATION(S): 213 SOLUTION TOPICAL at 06:33

## 2022-01-26 RX ADMIN — GABAPENTIN 100 MILLIGRAM(S): 400 CAPSULE ORAL at 16:37

## 2022-01-26 RX ADMIN — Medication 650 MILLIGRAM(S): at 13:20

## 2022-01-26 RX ADMIN — Medication 650 MILLIGRAM(S): at 00:24

## 2022-01-26 RX ADMIN — Medication 5 MILLIGRAM(S): at 01:00

## 2022-01-26 RX ADMIN — Medication 650 MILLIGRAM(S): at 22:05

## 2022-01-26 RX ADMIN — GABAPENTIN 100 MILLIGRAM(S): 400 CAPSULE ORAL at 13:39

## 2022-01-26 RX ADMIN — DULOXETINE HYDROCHLORIDE 30 MILLIGRAM(S): 30 CAPSULE, DELAYED RELEASE ORAL at 12:49

## 2022-01-26 RX ADMIN — Medication 325 MILLIGRAM(S): at 12:20

## 2022-01-26 RX ADMIN — Medication 650 MILLIGRAM(S): at 23:00

## 2022-01-26 RX ADMIN — TAMSULOSIN HYDROCHLORIDE 0.4 MILLIGRAM(S): 0.4 CAPSULE ORAL at 21:29

## 2022-01-26 RX ADMIN — PANTOPRAZOLE SODIUM 40 MILLIGRAM(S): 20 TABLET, DELAYED RELEASE ORAL at 06:33

## 2022-01-26 RX ADMIN — Medication 50 MICROGRAM(S): at 06:33

## 2022-01-26 RX ADMIN — Medication 2 UNIT(S): at 09:09

## 2022-01-26 RX ADMIN — HEPARIN SODIUM 5000 UNIT(S): 5000 INJECTION INTRAVENOUS; SUBCUTANEOUS at 21:30

## 2022-01-26 NOTE — PROGRESS NOTE ADULT - ASSESSMENT
60yo M hx of ?etoh cirrhosis, HTN, DM2 with neuropathy, hx of toxic megacolon c/b pneumatosis s/p colectomy and ileostomy, admission 1 months ago for hyperkalemia and hyponatremia, presents with generalized weakness malaise and lightheadedness  found to be in ARF ; nephrology consulted for emergent hemodialysis    Assessment/Plan:   #non-oliguric MEG on CKD   #hyperkalemia  #hyponatremia, euvolemic  #non-anion gap acidosis     likely 2/2 ischemic ATN   recommend:  please repeat Deloris UOsm and BMP  goal na by 5 pm 128-130  hold SSRI/HCTZ  strict i's and o's  renal diet  will assess need for HD tomorrow; if no need then HD catheter can be removed  monitor hemodynamics    Yumiko Santos D.O  PGY 4 nephrology Fellow   [phone]

## 2022-01-26 NOTE — PROGRESS NOTE ADULT - PROBLEM SELECTOR PLAN 12
F: none, encourage PO intake  E: on HD, caution with repletion  N: renal diet, consistent carb, dash    DVT: heparin subcut  GI: none    Code Status: full code  Dispo: shelter Patient with history of hypothyroidism, home meds (from discharge 1/19): synthroid 50mcg qd  - Continue home synthroid 50mcg qd

## 2022-01-26 NOTE — PROGRESS NOTE ADULT - PROBLEM SELECTOR PLAN 5
Patient reports symptoms of BPH including frequent urination along with sense of incomplete bladder emptying. On last admission, patient was discharged (1/19) with tamsulosin 0.4 mg qhs, since BPH might contribute to symptoms. Currently, patient is not endorsing any urinary complaints.   - Continue tamsulosin 0.4mg qhs Patient has a limited diet in shelter, avoids shelter food, eating only breakfast and dinner (cereal, rice, and laurita). Chart review from most recent admission revealed note from dietician/nutritional services with concern for weight loss and moderate protein-calorie malnutrition  -consult nutrition and appreciate recommendations  -consult SW if any outpatient solutions are available

## 2022-01-26 NOTE — PROGRESS NOTE ADULT - SUBJECTIVE AND OBJECTIVE BOX
SUBJECTIVE/OVERNIGHT EVENTS: No acute overnight events. Na of 128; restarted on home trazodone; administered melatonin and gabapentin 100 mg.     Pt seen at bedside, resting comfortably in ped. He currently denies any specific pain or discomfort, but endorses generalized malaise. States he is frustrated with his readmission. He denies nausea, vomiting, headache, lightheadedness, palpitations, shortness of breath, or abdominal pain. He denies any recent changes in his ostomy bag output, denies increased or bloody output. He denies dysuria, increased frequency, increased urgency, weak stream, or difficulty initiating stream.    Social: Patient lives in [facility]). When asked about his diet, he states that he rarely eats the shelter food because he does not like what is served. He frequently skips meals, and in a typical day he eats cereal for breakfast, skips lunch, and has rice or laurita for dinner. He does not drink water frequently. He understands that he is not meeting his caloric needs and expresses frustration about his social needs; requesting food stamps. Upon chart review, patient has a history of alcohol abuse, IV drug use, and methadone use. However, he states his last drink was 10 years ago and denies ever using IV drugs or being part of a methadone program.     VITAL SIGNS:  Vital Signs Last 24 Hrs  T(C): 36.9 (26 Jan 2022 09:16), Max: 36.9 (25 Jan 2022 17:52)  T(F): 98.5 (26 Jan 2022 09:16), Max: 98.5 (26 Jan 2022 09:16)  HR: 68 (26 Jan 2022 09:16) (68 - 85)  BP: 148/66 (26 Jan 2022 09:16) (116/56 - 148/66)  BP(mean): 94 (25 Jan 2022 18:00) (86 - 94)  RR: 16 (26 Jan 2022 09:16) (14 - 27)  SpO2: 100% (26 Jan 2022 09:16) (97% - 100%)    PHYSICAL EXAM:  General: frustrated; speaking in full sentences  HEENT: NC/AT; PERRL; EOMI; MMM, poor dentition with dental caries  Neck: supple; Right-sided IJ catheter in place, covered by gauze and tegaderm  Cardiac: RRR; +S1/S2  Pulm: CTA B/L; no W/R/R  GI: soft, NT/ND, +BS, well-healed midline abdominal scar from colectomy, clean ileostomy bag with no stool, no ascites or fluid wave  Extremities: WWP; no edema, clubbing or cyanosis  Vasc: 2+ radial, DP pulses B/L  : soliz in place  Neuro: AAOx3; no focal deficits  Psych: flat affect, irritated, does not make eye contact    MEDICATIONS:  MEDICATIONS  (STANDING):  amLODIPine   Tablet 10 milliGRAM(s) Oral every 24 hours  atorvastatin 10 milliGRAM(s) Oral at bedtime  chlorhexidine 2% Cloths 1 Application(s) Topical <User Schedule>  dextrose 40% Gel 15 Gram(s) Oral once  dextrose 5%. 1000 milliLiter(s) (50 mL/Hr) IV Continuous <Continuous>  dextrose 5%. 1000 milliLiter(s) (100 mL/Hr) IV Continuous <Continuous>  dextrose 5%. 1000 milliLiter(s) (1000 mL/Hr) IV Continuous <Continuous>  dextrose 50% Injectable 25 Gram(s) IV Push once  dextrose 50% Injectable 12.5 Gram(s) IV Push once  dextrose 50% Injectable 25 Gram(s) IV Push once  DULoxetine 30 milliGRAM(s) Oral daily  ferrous    sulfate 325 milliGRAM(s) Oral daily  gabapentin 100 milliGRAM(s) Oral once  glucagon  Injectable 1 milliGRAM(s) IntraMuscular once  heparin   Injectable 5000 Unit(s) SubCutaneous every 8 hours  hydrALAZINE 50 milliGRAM(s) Oral every 8 hours  insulin glargine Injectable (LANTUS) 3 Unit(s) SubCutaneous at bedtime  insulin lispro (ADMELOG) corrective regimen sliding scale   SubCutaneous Before meals and at bedtime  insulin lispro Injectable (ADMELOG) 2 Unit(s) SubCutaneous three times a day before meals  levothyroxine 50 MICROGram(s) Oral daily  pantoprazole    Tablet 40 milliGRAM(s) Oral before breakfast  tamsulosin 0.4 milliGRAM(s) Oral at bedtime  traZODone 50 milliGRAM(s) Oral at bedtime    MEDICATIONS  (PRN):  acetaminophen     Tablet .. 650 milliGRAM(s) Oral every 6 hours PRN Temp greater or equal to 38C (100.4F), Mild Pain (1 - 3)      ALLERGIES:  Allergies    No Known Allergies    Intolerances        LABS:                        7.9    3.68  )-----------( 141      ( 26 Jan 2022 07:19 )             23.9     01-26    130<L>  |  97  |  23  ----------------------------<  171<H>  4.1   |  25  |  1.28    Ca    8.8      26 Jan 2022 12:14  Phos  2.0     01-26  Mg     1.6     01-26    TPro  6.6  /  Alb  3.5  /  TBili  0.2  /  DBili  x   /  AST  11  /  ALT  10  /  AlkPhos  78  01-26    PT/INR - ( 24 Jan 2022 23:58 )   PT: 12.4 sec;   INR: 1.04          PTT - ( 24 Jan 2022 23:58 )  PTT:31.3 sec    RADIOLOGY & ADDITIONAL TESTS: Reviewed. SUBJECTIVE/OVERNIGHT EVENTS: No acute overnight events. Na of 128; restarted on home trazodone; administered melatonin and gabapentin 100 mg.     Pt seen at bedside, resting comfortably in ped. He currently denies any specific pain or discomfort, but endorses generalized malaise. States he is frustrated with his readmission. He denies nausea, vomiting, headache, lightheadedness, palpitations, shortness of breath, or abdominal pain. He denies any recent changes in his ostomy bag output, denies increased or bloody output. He denies dysuria, increased frequency, increased urgency, weak stream, or difficulty initiating stream.    Social: Patient lives in [facility]). Ambulates with walker. When asked about his diet, he states that he rarely eats the shelter food because he does not like what is served. He frequently skips meals, and in a typical day he eats cereal for breakfast, skips lunch, and has rice or laurita for dinner. He does not drink water frequently. He understands that he is not meeting his caloric needs and expresses frustration about his social needs; requesting food stamps. Upon chart review, patient has a history of alcohol abuse, IV drug use, and methadone use. However, he states his last drink was 10 years ago and denies ever using IV drugs or being part of a methadone program.     VITAL SIGNS:  Vital Signs Last 24 Hrs  T(C): 36.9 (26 Jan 2022 09:16), Max: 36.9 (25 Jan 2022 17:52)  T(F): 98.5 (26 Jan 2022 09:16), Max: 98.5 (26 Jan 2022 09:16)  HR: 68 (26 Jan 2022 09:16) (68 - 85)  BP: 148/66 (26 Jan 2022 09:16) (116/56 - 148/66)  BP(mean): 94 (25 Jan 2022 18:00) (86 - 94)  RR: 16 (26 Jan 2022 09:16) (14 - 27)  SpO2: 100% (26 Jan 2022 09:16) (97% - 100%)    PHYSICAL EXAM:  General: frustrated; speaking in full sentences  HEENT: NC/AT; PERRL; EOMI; MMM, poor dentition with dental caries  Neck: supple; Right-sided IJ catheter in place, covered by gauze and tegaderm  Cardiac: RRR; +S1/S2  Pulm: CTA B/L; no W/R/R  GI: soft, NT/ND, +BS, well-healed midline abdominal scar from colectomy, clean ileostomy bag with no stool, no ascites or fluid wave  Extremities: WWP; no edema, clubbing or cyanosis  Vasc: 2+ radial, DP pulses B/L  : soliz in place  Neuro: AAOx3; no focal deficits  Psych: flat affect, irritable, does not make eye contact    MEDICATIONS:  MEDICATIONS  (STANDING):  amLODIPine   Tablet 10 milliGRAM(s) Oral every 24 hours  atorvastatin 10 milliGRAM(s) Oral at bedtime  chlorhexidine 2% Cloths 1 Application(s) Topical <User Schedule>  dextrose 40% Gel 15 Gram(s) Oral once  dextrose 5%. 1000 milliLiter(s) (50 mL/Hr) IV Continuous <Continuous>  dextrose 5%. 1000 milliLiter(s) (100 mL/Hr) IV Continuous <Continuous>  dextrose 5%. 1000 milliLiter(s) (1000 mL/Hr) IV Continuous <Continuous>  dextrose 50% Injectable 25 Gram(s) IV Push once  dextrose 50% Injectable 12.5 Gram(s) IV Push once  dextrose 50% Injectable 25 Gram(s) IV Push once  DULoxetine 30 milliGRAM(s) Oral daily  ferrous    sulfate 325 milliGRAM(s) Oral daily  gabapentin 100 milliGRAM(s) Oral once  glucagon  Injectable 1 milliGRAM(s) IntraMuscular once  heparin   Injectable 5000 Unit(s) SubCutaneous every 8 hours  hydrALAZINE 50 milliGRAM(s) Oral every 8 hours  insulin glargine Injectable (LANTUS) 3 Unit(s) SubCutaneous at bedtime  insulin lispro (ADMELOG) corrective regimen sliding scale   SubCutaneous Before meals and at bedtime  insulin lispro Injectable (ADMELOG) 2 Unit(s) SubCutaneous three times a day before meals  levothyroxine 50 MICROGram(s) Oral daily  pantoprazole    Tablet 40 milliGRAM(s) Oral before breakfast  tamsulosin 0.4 milliGRAM(s) Oral at bedtime  traZODone 50 milliGRAM(s) Oral at bedtime    MEDICATIONS  (PRN):  acetaminophen     Tablet .. 650 milliGRAM(s) Oral every 6 hours PRN Temp greater or equal to 38C (100.4F), Mild Pain (1 - 3)      ALLERGIES:  Allergies    No Known Allergies    Intolerances        LABS:                        7.9    3.68  )-----------( 141      ( 26 Jan 2022 07:19 )             23.9     01-26    130<L>  |  97  |  23  ----------------------------<  171<H>  4.1   |  25  |  1.28    Ca    8.8      26 Jan 2022 12:14  Phos  2.0     01-26  Mg     1.6     01-26    TPro  6.6  /  Alb  3.5  /  TBili  0.2  /  DBili  x   /  AST  11  /  ALT  10  /  AlkPhos  78  01-26    PT/INR - ( 24 Jan 2022 23:58 )   PT: 12.4 sec;   INR: 1.04          PTT - ( 24 Jan 2022 23:58 )  PTT:31.3 sec    RADIOLOGY & ADDITIONAL TESTS: Reviewed. SUBJECTIVE/OVERNIGHT EVENTS: No acute overnight events. Na of 128; restarted on home trazodone; administered melatonin and gabapentin 100 mg.     Pt seen at bedside, resting comfortably in bed. He currently denies any specific pain or discomfort, but endorses generalized malaise. States he is frustrated with his readmission. He denies nausea, vomiting, headache, lightheadedness, palpitations, shortness of breath, or abdominal pain. He denies any recent changes in his ostomy bag output, denies increased or bloody output. He denies dysuria, increased frequency, increased urgency, weak stream, or difficulty initiating stream.    Social: Patient lives in [facility] Ambulates with walker. When asked about his diet, he states that he rarely eats the shelter food because he does not like what is served. He frequently skips meals, and in a typical day he eats cereal for breakfast, skips lunch, and has rice or laurita for dinner. He does not drink water frequently. He understands that he is not meeting his caloric needs and expresses frustration about his social needs; requesting food stamps. Upon chart review, patient has a history of alcohol abuse, IV drug use, and methadone use. However, he states his last drink was 10 years ago and denies ever using IV drugs or being part of a methadone program.     VITAL SIGNS:  Vital Signs Last 24 Hrs  T(C): 36.9 (26 Jan 2022 09:16), Max: 36.9 (25 Jan 2022 17:52)  T(F): 98.5 (26 Jan 2022 09:16), Max: 98.5 (26 Jan 2022 09:16)  HR: 68 (26 Jan 2022 09:16) (68 - 85)  BP: 148/66 (26 Jan 2022 09:16) (116/56 - 148/66)  BP(mean): 94 (25 Jan 2022 18:00) (86 - 94)  RR: 16 (26 Jan 2022 09:16) (14 - 27)  SpO2: 100% (26 Jan 2022 09:16) (97% - 100%)    PHYSICAL EXAM:  General: frustrated; speaking in full sentences  HEENT: NC/AT; PERRL; EOMI; MMM, poor dentition with dental caries  Neck: supple; Right-sided IJ catheter in place, covered by gauze and tegaderm  Cardiac: RRR; +S1/S2  Pulm: CTA B/L; no W/R/R  GI: soft, NT/ND, +BS, well-healed midline abdominal scar from colectomy, clean ileostomy bag with no stool, no ascites or fluid wave  Extremities: WWP; no edema, clubbing or cyanosis  Vasc: 2+ radial, DP pulses B/L  : soliz in place  Neuro: AAOx3; no focal deficits  Psych: flat affect, irritable, does not make eye contact    MEDICATIONS:  MEDICATIONS  (STANDING):  amLODIPine   Tablet 10 milliGRAM(s) Oral every 24 hours  atorvastatin 10 milliGRAM(s) Oral at bedtime  chlorhexidine 2% Cloths 1 Application(s) Topical <User Schedule>  dextrose 40% Gel 15 Gram(s) Oral once  dextrose 5%. 1000 milliLiter(s) (50 mL/Hr) IV Continuous <Continuous>  dextrose 5%. 1000 milliLiter(s) (100 mL/Hr) IV Continuous <Continuous>  dextrose 5%. 1000 milliLiter(s) (1000 mL/Hr) IV Continuous <Continuous>  dextrose 50% Injectable 25 Gram(s) IV Push once  dextrose 50% Injectable 12.5 Gram(s) IV Push once  dextrose 50% Injectable 25 Gram(s) IV Push once  DULoxetine 30 milliGRAM(s) Oral daily  ferrous    sulfate 325 milliGRAM(s) Oral daily  gabapentin 100 milliGRAM(s) Oral once  glucagon  Injectable 1 milliGRAM(s) IntraMuscular once  heparin   Injectable 5000 Unit(s) SubCutaneous every 8 hours  hydrALAZINE 50 milliGRAM(s) Oral every 8 hours  insulin glargine Injectable (LANTUS) 3 Unit(s) SubCutaneous at bedtime  insulin lispro (ADMELOG) corrective regimen sliding scale   SubCutaneous Before meals and at bedtime  insulin lispro Injectable (ADMELOG) 2 Unit(s) SubCutaneous three times a day before meals  levothyroxine 50 MICROGram(s) Oral daily  pantoprazole    Tablet 40 milliGRAM(s) Oral before breakfast  tamsulosin 0.4 milliGRAM(s) Oral at bedtime  traZODone 50 milliGRAM(s) Oral at bedtime    MEDICATIONS  (PRN):  acetaminophen     Tablet .. 650 milliGRAM(s) Oral every 6 hours PRN Temp greater or equal to 38C (100.4F), Mild Pain (1 - 3)      ALLERGIES:  Allergies    No Known Allergies    Intolerances        LABS:                        7.9    3.68  )-----------( 141      ( 26 Jan 2022 07:19 )             23.9     01-26    130<L>  |  97  |  23  ----------------------------<  171<H>  4.1   |  25  |  1.28    Ca    8.8      26 Jan 2022 12:14  Phos  2.0     01-26  Mg     1.6     01-26    TPro  6.6  /  Alb  3.5  /  TBili  0.2  /  DBili  x   /  AST  11  /  ALT  10  /  AlkPhos  78  01-26    PT/INR - ( 24 Jan 2022 23:58 )   PT: 12.4 sec;   INR: 1.04          PTT - ( 24 Jan 2022 23:58 )  PTT:31.3 sec    RADIOLOGY & ADDITIONAL TESTS: Reviewed.

## 2022-01-26 NOTE — PROGRESS NOTE ADULT - PROBLEM SELECTOR PLAN 8
Patient with history of alcohol overuse and cirrhosis.  Currently not decompensated. Reports that last drink was 10 years ago.   - No acute interventions Patient with history of HLD, home meds (from discharge 1/19): atorvastatin 10mg  - Continue atorvastatin 10mg qhs

## 2022-01-26 NOTE — PROGRESS NOTE ADULT - PROBLEM SELECTOR PLAN 7
Patient with history of HLD, home meds (from discharge 1/19): atorvastatin 10mg  - Continue atorvastatin 10mg qhs Patient with history of hypertension, home meds (from discharge 1/19): hydrALAZINE 50mg q8hrs, norvasc 10mg qd. Meds were initially held, but restarted in MICU, BP controlled.  - Continue home medications

## 2022-01-26 NOTE — PROGRESS NOTE ADULT - PROBLEM SELECTOR PLAN 6
Patient with history of hypertension, home meds (from discharge 1/19): hydrALAZINE 50mg q8hrs, norvasc 10mg qd. Meds were initially held, but restarted in MICU, BP controlled.  - Continue home medications Patient reports symptoms of BPH including frequent urination along with sense of incomplete bladder emptying. On last admission, patient was discharged (1/19) with tamsulosin 0.4 mg qhs, since BPH might contribute to symptoms. Currently, patient is not endorsing any urinary complaints.   - Continue tamsulosin 0.4mg qhs

## 2022-01-26 NOTE — PROGRESS NOTE ADULT - PROBLEM SELECTOR PLAN 10
Patient with history of DM complicated by peripheral neuropathy, A1c is 6.7 from 1/6/22. Home meds (from discharge 1/19): Janumet 50 mg-500 mg BID. On gabapentin 300mg TID for peripheral neuropathy. As gabapentin requires dose adjustment for dialysis, 100 mg was given after first session.   - Holding oral diabetes medications  - Lantus 3 units, Lispo 2 units TID, and continue mISS for coverage (based on last admission) Patient with history of toxic megacolon c/b pneumatosis s/p colectomy/ileostomy. Ostomy output draining brown stool.  - No acute interventions, continuing to monitor

## 2022-01-26 NOTE — PROGRESS NOTE ADULT - ASSESSMENT
62M with HTN, DM c/b neuropathy, EtOH liver cirrhosis, toxic megacolon c/b pneumatosis s/p colectomy/ileostomy, and multiple admissions for ARF w/ hyperkalemia (most recently 1/6-1/19/2022, requiring HD),presented with weakness, found to have ARF and hyperkalemia w/ EKG changes. S/p emergent hemodialysis in ICU, with Na correction from 119->135 in 24 hours, s/p 6 mcg DDAVP IV and 2L D5W, electrolytes improving.

## 2022-01-26 NOTE — PROGRESS NOTE ADULT - PROBLEM SELECTOR PLAN 3
k on admission 7.6, EKG with peaked T waves, s/p D5+insulin, lokelma. K now improved to 4.6 after dialysis. Now stable at 4.1 this AM.   - Continue to monitor

## 2022-01-26 NOTE — PROGRESS NOTE ADULT - SUBJECTIVE AND OBJECTIVE BOX
Patient is a 62y Male seen and evaluated at bedside.       Meds:    acetaminophen     Tablet .. 650 every 6 hours PRN  amLODIPine   Tablet 10 every 24 hours  atorvastatin 10 at bedtime  chlorhexidine 2% Cloths 1 <User Schedule>  dextrose 40% Gel 15 once  dextrose 5%. 1000 <Continuous>  dextrose 5%. 1000 <Continuous>  dextrose 5%. 1000 <Continuous>  dextrose 50% Injectable 25 once  dextrose 50% Injectable 12.5 once  dextrose 50% Injectable 25 once  DULoxetine 30 daily  ferrous    sulfate 325 daily  glucagon  Injectable 1 once  heparin   Injectable 5000 every 8 hours  hydrALAZINE 50 every 8 hours  insulin glargine Injectable (LANTUS) 3 at bedtime  insulin lispro (ADMELOG) corrective regimen sliding scale  Before meals and at bedtime  insulin lispro Injectable (ADMELOG) 2 three times a day before meals  levothyroxine 50 daily  pantoprazole    Tablet 40 before breakfast  tamsulosin 0.4 at bedtime  traZODone 50 at bedtime      T(C): , Max: 36.9 (22 @ 17:52)  T(F): , Max: 98.5 (22 @ 09:16)  HR: 68 (22 @ 09:16)  BP: 148/66 (22 @ 09:16)  BP(mean): 94 (22 @ 18:00)  RR: 16 (22 @ 09:16)  SpO2: 100% (22 @ 09:16)  Wt(kg): --     @ 07:01  -   @ 07:00  --------------------------------------------------------  IN: 1120 mL / OUT: 1850 mL / NET: -730 mL          Review of Systems:  CONSTITUTIONAL: No fever or chills, No fatigue or tiredness  RESPIRATORY: No shortness of breath, cough, hemoptysis  CARDIOVASCULAR: No chest pain or shortness of breath  GASTROINTESTINAL: No abdominal or flank pain, No nausea or vomiting, No diarrhea  GENITOURINARY: No dysuria or urinary burning, No difficulty passing urine, No hematuria  NEUROLOGICAL: No headaches or blurred vision  SKIN: No skin rashes   MUSCULOSKELETAL: No arthralgia, No leg edema, No muscle pain      PHYSICAL EXAM:  GENERAL: well-developed, well nourished, alert, no acute distress at present  NECK: supple, No JVD  CHEST/LUNG: Clear to auscultation bilaterally  HEART: normal S1S2, RRR  ABDOMEN: Soft, Nontender, +BS, No flank tenderness bilateral  EXTREMITIES: No clubbing, cyanosis, or edema   NEUROLOGY: AAO x3, no focal neurological deficit  ACCESS: good thrill and bruit appreciated      LABS:                        7.9    3.68  )-----------( 141      ( 2022 07:19 )             23.9         130<L>  |  98  |  24<H>  ----------------------------<  143<H>  4.2   |  23  |  1.36<H>    Ca    8.4      2022 07:19  Phos  2.0       Mg     1.6         TPro  6.6  /  Alb  3.5  /  TBili  0.2  /  DBili  x   /  AST  11  /  ALT  10  /  AlkPhos  78        PT/INR - ( 2022 23:58 )   PT: 12.4 sec;   INR: 1.04          PTT - ( 2022 23:58 )  PTT:31.3 sec  Urinalysis Basic - ( 2022 19:08 )    Color: Yellow / Appearance: Clear / S.010 / pH: x  Gluc: x / Ketone: NEGATIVE  / Bili: NEGATIVE / Urobili: 0.2 E.U./dL   Blood: x / Protein: NEGATIVE mg/dL / Nitrite: NEGATIVE   Leuk Esterase: NEGATIVE / RBC: < 5 /HPF / WBC < 5 /HPF   Sq Epi: x / Non Sq Epi: 5-10 /HPF / Bacteria: Present /HPF      Creatinine, Random Urine: 32 mg/dL ( @ 11:25)  Sodium, Random Urine: 28 mmol/L ( @ 11:25)  Osmolality, Random Urine: 277 mosm/kg ( @ 11:25)        RADIOLOGY & ADDITIONAL STUDIES:           Patient is a 62y Male seen and evaluated at bedside. patient is sleepy this AM ; though he states he is feeling a bit better ; no longer having electrolyte abnormalities Na noted to be 130 this AM; uop 1.2L/24 hours       Meds:    acetaminophen     Tablet .. 650 every 6 hours PRN  amLODIPine   Tablet 10 every 24 hours  atorvastatin 10 at bedtime  chlorhexidine 2% Cloths 1 <User Schedule>  dextrose 40% Gel 15 once  dextrose 5%. 1000 <Continuous>  dextrose 5%. 1000 <Continuous>  dextrose 5%. 1000 <Continuous>  dextrose 50% Injectable 25 once  dextrose 50% Injectable 12.5 once  dextrose 50% Injectable 25 once  DULoxetine 30 daily  ferrous    sulfate 325 daily  glucagon  Injectable 1 once  heparin   Injectable 5000 every 8 hours  hydrALAZINE 50 every 8 hours  insulin glargine Injectable (LANTUS) 3 at bedtime  insulin lispro (ADMELOG) corrective regimen sliding scale  Before meals and at bedtime  insulin lispro Injectable (ADMELOG) 2 three times a day before meals  levothyroxine 50 daily  pantoprazole    Tablet 40 before breakfast  tamsulosin 0.4 at bedtime  traZODone 50 at bedtime      T(C): , Max: 36.9 (22 @ 17:52)  T(F): , Max: 98.5 (22 @ 09:16)  HR: 68 (22 @ 09:16)  BP: 148/66 (22 @ 09:16)  BP(mean): 94 (22 @ 18:00)  RR: 16 (22 @ 09:16)  SpO2: 100% (22 @ 09:16)  Wt(kg): --     @ 07:  -   @ 07:00  --------------------------------------------------------  IN: 1120 mL / OUT: 1850 mL / NET: -730 mL          Review of Systems:  all other ROS negative      PHYSICAL EXAM:  GENERAL: well-developed, well nourished, alert, no acute distress at present  NECK: supple, No JVD  CHEST/LUNG: Clear to auscultation bilaterally  HEART: normal S1S2, RRR  ABDOMEN: Soft, Nontender, +BS, No flank tenderness bilateral  EXTREMITIES: No clubbing, cyanosis, or edema   NEUROLOGY: AAO x3, no focal neurological deficit  ACCESS: good thrill and bruit appreciated      LABS:                        7.9    3.68  )-----------( 141      ( 2022 07:19 )             23.9         130<L>  |  98  |  24<H>  ----------------------------<  143<H>  4.2   |  23  |  1.36<H>    Ca    8.4      2022 07:19  Phos  2.0       Mg     1.6         TPro  6.6  /  Alb  3.5  /  TBili  0.2  /  DBili  x   /  AST  11  /  ALT  10  /  AlkPhos  78        PT/INR - ( 2022 23:58 )   PT: 12.4 sec;   INR: 1.04          PTT - ( 2022 23:58 )  PTT:31.3 sec  Urinalysis Basic - ( 2022 19:08 )    Color: Yellow / Appearance: Clear / S.010 / pH: x  Gluc: x / Ketone: NEGATIVE  / Bili: NEGATIVE / Urobili: 0.2 E.U./dL   Blood: x / Protein: NEGATIVE mg/dL / Nitrite: NEGATIVE   Leuk Esterase: NEGATIVE / RBC: < 5 /HPF / WBC < 5 /HPF   Sq Epi: x / Non Sq Epi: 5-10 /HPF / Bacteria: Present /HPF      Creatinine, Random Urine: 32 mg/dL ( @ 11:25)  Sodium, Random Urine: 28 mmol/L ( @ 11:25)  Osmolality, Random Urine: 277 mosm/kg ( @ 11:25)        RADIOLOGY & ADDITIONAL STUDIES:           Patient is a 62y Male seen and evaluated at bedside. patient is sleepy this AM ; though he states he is feeling a bit better ; no longer having electrolyte abnormalities Na noted to be 130 this AM; uop 1.2L/24 hours no need for HD today ; remains hemodynamically stable       Meds:    acetaminophen     Tablet .. 650 every 6 hours PRN  amLODIPine   Tablet 10 every 24 hours  atorvastatin 10 at bedtime  chlorhexidine 2% Cloths 1 <User Schedule>  dextrose 40% Gel 15 once  dextrose 5%. 1000 <Continuous>  dextrose 5%. 1000 <Continuous>  dextrose 5%. 1000 <Continuous>  dextrose 50% Injectable 25 once  dextrose 50% Injectable 12.5 once  dextrose 50% Injectable 25 once  DULoxetine 30 daily  ferrous    sulfate 325 daily  glucagon  Injectable 1 once  heparin   Injectable 5000 every 8 hours  hydrALAZINE 50 every 8 hours  insulin glargine Injectable (LANTUS) 3 at bedtime  insulin lispro (ADMELOG) corrective regimen sliding scale  Before meals and at bedtime  insulin lispro Injectable (ADMELOG) 2 three times a day before meals  levothyroxine 50 daily  pantoprazole    Tablet 40 before breakfast  tamsulosin 0.4 at bedtime  traZODone 50 at bedtime      T(C): , Max: 36.9 (22 @ 17:52)  T(F): , Max: 98.5 (22 @ 09:16)  HR: 68 (22 @ 09:16)  BP: 148/66 (22 @ 09:16)  BP(mean): 94 (22 @ 18:00)  RR: 16 (22 @ 09:16)  SpO2: 100% (22 @ 09:16)  Wt(kg): --     @ 07:01  -   @ 07:00  --------------------------------------------------------  IN: 1120 mL / OUT: 1850 mL / NET: -730 mL          Review of Systems:  all other ROS negative      PHYSICAL EXAM:  GENERAL: lethargic yet arousable  CHEST/LUNG: Clear to auscultation bilaterally no rales, rhonchi or wheezing   HEART: normal S1S2, RRR  ABDOMEN: Soft, Nontender, +BS,   EXTREMITIES: No clubbing, cyanosis, or edema   ACCESS: +[facility]      LABS:                        7.9    3.68  )-----------( 141      ( 2022 07:19 )             23.9         130<L>  |  98  |  24<H>  ----------------------------<  143<H>  4.2   |  23  |  1.36<H>    Ca    8.4      2022 07:19  Phos  2.0       Mg     1.6         TPro  6.6  /  Alb  3.5  /  TBili  0.2  /  DBili  x   /  AST  11  /  ALT  10  /  AlkPhos  78        PT/INR - ( 2022 23:58 )   PT: 12.4 sec;   INR: 1.04          PTT - ( 2022 23:58 )  PTT:31.3 sec  Urinalysis Basic - ( 2022 19:08 )    Color: Yellow / Appearance: Clear / S.010 / pH: x  Gluc: x / Ketone: NEGATIVE  / Bili: NEGATIVE / Urobili: 0.2 E.U./dL   Blood: x / Protein: NEGATIVE mg/dL / Nitrite: NEGATIVE   Leuk Esterase: NEGATIVE / RBC: < 5 /HPF / WBC < 5 /HPF   Sq Epi: x / Non Sq Epi: 5-10 /HPF / Bacteria: Present /HPF      Creatinine, Random Urine: 32 mg/dL ( @ 11:25)  Sodium, Random Urine: 28 mmol/L ( @ 11:25)  Osmolality, Random Urine: 277 mosm/kg ( @ 11:25)        RADIOLOGY & ADDITIONAL STUDIES:

## 2022-01-26 NOTE — PROGRESS NOTE ADULT - PROBLEM SELECTOR PLAN 11
Patient with history of hypothyroidism, home meds (from discharge 1/19): synthroid 50mcg qd  - Continue home synthroid 50mcg qd Patient with history of DM complicated by peripheral neuropathy, A1c is 6.7 from 1/6/22. Home meds (from discharge 1/19): Janumet 50 mg-500 mg BID. On gabapentin 300mg TID for peripheral neuropathy. As gabapentin requires dose adjustment for dialysis, 100 mg was given after first session.   - Holding oral diabetes medications  - Lantus 3 units, Lispo 2 units TID, and continue mISS for coverage (based on last admission)

## 2022-01-26 NOTE — PROGRESS NOTE ADULT - PROBLEM SELECTOR PLAN 9
Patient with history of toxic megacolon c/b pneumatosis s/p colectomy/ileostomy. Ostomy output draining brown stool.  - No acute interventions, continuing to monitor Patient with history of alcohol overuse and cirrhosis.  Currently not decompensated. Reports that last drink was 10 years ago.   - No acute interventions

## 2022-01-26 NOTE — PROGRESS NOTE ADULT - PROBLEM SELECTOR PLAN 2
Na on admission 114, within 24 hrs corrected to 135.  Patient A&O x 4 with no focal neurological deficits.  S/p 6 mcg DDAVP and 2L D5W. Renal following patient. Na is 128 -> 130 on AM labs.   - appreciate renal recommendations   - f/u repeat Tanvi, UOsm, and BMP  - goal Na by 5pm is 128-130.  - hold SSRI/HCTZ

## 2022-01-27 ENCOUNTER — TRANSCRIPTION ENCOUNTER (OUTPATIENT)
Age: 62
End: 2022-01-27

## 2022-01-27 LAB
ANION GAP SERPL CALC-SCNC: 9 MMOL/L — SIGNIFICANT CHANGE UP (ref 5–17)
APPEARANCE UR: CLEAR — SIGNIFICANT CHANGE UP
BACTERIA # UR AUTO: ABNORMAL /HPF
BILIRUB UR-MCNC: NEGATIVE — SIGNIFICANT CHANGE UP
BUN SERPL-MCNC: 15 MG/DL — SIGNIFICANT CHANGE UP (ref 7–23)
CALCIUM SERPL-MCNC: 8.7 MG/DL — SIGNIFICANT CHANGE UP (ref 8.4–10.5)
CHLORIDE SERPL-SCNC: 96 MMOL/L — SIGNIFICANT CHANGE UP (ref 96–108)
CO2 SERPL-SCNC: 23 MMOL/L — SIGNIFICANT CHANGE UP (ref 22–31)
COLOR SPEC: YELLOW — SIGNIFICANT CHANGE UP
COMMENT - URINE: SIGNIFICANT CHANGE UP
CREAT ?TM UR-MCNC: 50 MG/DL — SIGNIFICANT CHANGE UP
CREAT SERPL-MCNC: 1.07 MG/DL — SIGNIFICANT CHANGE UP (ref 0.5–1.3)
DIFF PNL FLD: ABNORMAL
EPI CELLS # UR: SIGNIFICANT CHANGE UP /HPF (ref 0–5)
GLUCOSE BLDC GLUCOMTR-MCNC: 133 MG/DL — HIGH (ref 70–99)
GLUCOSE BLDC GLUCOMTR-MCNC: 168 MG/DL — HIGH (ref 70–99)
GLUCOSE BLDC GLUCOMTR-MCNC: 177 MG/DL — HIGH (ref 70–99)
GLUCOSE BLDC GLUCOMTR-MCNC: 314 MG/DL — HIGH (ref 70–99)
GLUCOSE SERPL-MCNC: 116 MG/DL — HIGH (ref 70–99)
GLUCOSE UR QL: 250
HCT VFR BLD CALC: 24.1 % — LOW (ref 39–50)
HGB BLD-MCNC: 8 G/DL — LOW (ref 13–17)
KETONES UR-MCNC: NEGATIVE — SIGNIFICANT CHANGE UP
LEUKOCYTE ESTERASE UR-ACNC: ABNORMAL
MAGNESIUM SERPL-MCNC: 1.5 MG/DL — LOW (ref 1.6–2.6)
MCHC RBC-ENTMCNC: 29.7 PG — SIGNIFICANT CHANGE UP (ref 27–34)
MCHC RBC-ENTMCNC: 33.2 GM/DL — SIGNIFICANT CHANGE UP (ref 32–36)
MCV RBC AUTO: 89.6 FL — SIGNIFICANT CHANGE UP (ref 80–100)
NITRITE UR-MCNC: NEGATIVE — SIGNIFICANT CHANGE UP
NRBC # BLD: 0 /100 WBCS — SIGNIFICANT CHANGE UP (ref 0–0)
OSMOLALITY UR: 290 MOSM/KG — LOW (ref 300–900)
PH UR: 6 — SIGNIFICANT CHANGE UP (ref 5–8)
PHOSPHATE SERPL-MCNC: 1.9 MG/DL — LOW (ref 2.5–4.5)
PLATELET # BLD AUTO: 154 K/UL — SIGNIFICANT CHANGE UP (ref 150–400)
POTASSIUM SERPL-MCNC: 4.1 MMOL/L — SIGNIFICANT CHANGE UP (ref 3.5–5.3)
POTASSIUM SERPL-SCNC: 4.1 MMOL/L — SIGNIFICANT CHANGE UP (ref 3.5–5.3)
PROT UR-MCNC: 30 MG/DL
RBC # BLD: 2.69 M/UL — LOW (ref 4.2–5.8)
RBC # FLD: 14.7 % — HIGH (ref 10.3–14.5)
RBC CASTS # UR COMP ASSIST: < 5 /HPF — SIGNIFICANT CHANGE UP
SODIUM SERPL-SCNC: 128 MMOL/L — LOW (ref 135–145)
SODIUM UR-SCNC: 31 MMOL/L — SIGNIFICANT CHANGE UP
SP GR SPEC: 1.01 — SIGNIFICANT CHANGE UP (ref 1–1.03)
UROBILINOGEN FLD QL: 0.2 E.U./DL — SIGNIFICANT CHANGE UP
WBC # BLD: 4.59 K/UL — SIGNIFICANT CHANGE UP (ref 3.8–10.5)
WBC # FLD AUTO: 4.59 K/UL — SIGNIFICANT CHANGE UP (ref 3.8–10.5)
WBC UR QL: ABNORMAL /HPF

## 2022-01-27 PROCEDURE — 99233 SBSQ HOSP IP/OBS HIGH 50: CPT

## 2022-01-27 RX ORDER — LANOLIN ALCOHOL/MO/W.PET/CERES
5 CREAM (GRAM) TOPICAL ONCE
Refills: 0 | Status: COMPLETED | OUTPATIENT
Start: 2022-01-27 | End: 2022-01-27

## 2022-01-27 RX ADMIN — HEPARIN SODIUM 5000 UNIT(S): 5000 INJECTION INTRAVENOUS; SUBCUTANEOUS at 06:19

## 2022-01-27 RX ADMIN — Medication 2 UNIT(S): at 09:40

## 2022-01-27 RX ADMIN — GABAPENTIN 100 MILLIGRAM(S): 400 CAPSULE ORAL at 06:18

## 2022-01-27 RX ADMIN — ATORVASTATIN CALCIUM 10 MILLIGRAM(S): 80 TABLET, FILM COATED ORAL at 22:25

## 2022-01-27 RX ADMIN — AMLODIPINE BESYLATE 10 MILLIGRAM(S): 2.5 TABLET ORAL at 09:46

## 2022-01-27 RX ADMIN — Medication 2: at 12:53

## 2022-01-27 RX ADMIN — Medication 325 MILLIGRAM(S): at 12:54

## 2022-01-27 RX ADMIN — TAMSULOSIN HYDROCHLORIDE 0.4 MILLIGRAM(S): 0.4 CAPSULE ORAL at 22:24

## 2022-01-27 RX ADMIN — INSULIN GLARGINE 3 UNIT(S): 100 INJECTION, SOLUTION SUBCUTANEOUS at 22:07

## 2022-01-27 RX ADMIN — GABAPENTIN 100 MILLIGRAM(S): 400 CAPSULE ORAL at 22:24

## 2022-01-27 RX ADMIN — Medication 2: at 22:06

## 2022-01-27 RX ADMIN — Medication 5 MILLIGRAM(S): at 00:19

## 2022-01-27 RX ADMIN — Medication 50 MILLIGRAM(S): at 06:18

## 2022-01-27 RX ADMIN — Medication 8: at 18:05

## 2022-01-27 RX ADMIN — Medication 2 UNIT(S): at 18:06

## 2022-01-27 RX ADMIN — Medication 50 MILLIGRAM(S): at 22:25

## 2022-01-27 RX ADMIN — Medication 650 MILLIGRAM(S): at 16:24

## 2022-01-27 RX ADMIN — Medication 50 MICROGRAM(S): at 06:18

## 2022-01-27 RX ADMIN — GABAPENTIN 100 MILLIGRAM(S): 400 CAPSULE ORAL at 14:03

## 2022-01-27 RX ADMIN — Medication 650 MILLIGRAM(S): at 22:25

## 2022-01-27 RX ADMIN — Medication 50 MILLIGRAM(S): at 14:03

## 2022-01-27 RX ADMIN — HEPARIN SODIUM 5000 UNIT(S): 5000 INJECTION INTRAVENOUS; SUBCUTANEOUS at 22:28

## 2022-01-27 RX ADMIN — Medication 2 UNIT(S): at 12:54

## 2022-01-27 RX ADMIN — PANTOPRAZOLE SODIUM 40 MILLIGRAM(S): 20 TABLET, DELAYED RELEASE ORAL at 06:19

## 2022-01-27 RX ADMIN — Medication 650 MILLIGRAM(S): at 10:45

## 2022-01-27 RX ADMIN — Medication 650 MILLIGRAM(S): at 09:47

## 2022-01-27 RX ADMIN — Medication 650 MILLIGRAM(S): at 17:22

## 2022-01-27 RX ADMIN — DULOXETINE HYDROCHLORIDE 30 MILLIGRAM(S): 30 CAPSULE, DELAYED RELEASE ORAL at 12:54

## 2022-01-27 RX ADMIN — HEPARIN SODIUM 5000 UNIT(S): 5000 INJECTION INTRAVENOUS; SUBCUTANEOUS at 14:02

## 2022-01-27 RX ADMIN — CHLORHEXIDINE GLUCONATE 1 APPLICATION(S): 213 SOLUTION TOPICAL at 06:19

## 2022-01-27 NOTE — PROGRESS NOTE ADULT - SUBJECTIVE AND OBJECTIVE BOX
Patient is a 62y Male seen and evaluated at bedside. remains hemodynamically stable; uop 3L/24 hours Na at goal this AM; electrolytes and volume status noted       Meds:    acetaminophen     Tablet .. 650 every 6 hours PRN  amLODIPine   Tablet 10 every 24 hours  atorvastatin 10 at bedtime  chlorhexidine 2% Cloths 1 <User Schedule>  dextrose 40% Gel 15 once  dextrose 5%. 1000 <Continuous>  dextrose 5%. 1000 <Continuous>  dextrose 5%. 1000 <Continuous>  dextrose 50% Injectable 25 once  dextrose 50% Injectable 12.5 once  dextrose 50% Injectable 25 once  DULoxetine 30 daily  ferrous    sulfate 325 daily  gabapentin 100 three times a day  glucagon  Injectable 1 once  heparin   Injectable 5000 every 8 hours  hydrALAZINE 50 every 8 hours  insulin glargine Injectable (LANTUS) 3 at bedtime  insulin lispro (ADMELOG) corrective regimen sliding scale  Before meals and at bedtime  insulin lispro Injectable (ADMELOG) 2 three times a day before meals  levothyroxine 50 daily  pantoprazole    Tablet 40 before breakfast  tamsulosin 0.4 at bedtime  traZODone 50 at bedtime      T(C): , Max: 36.8 (01-26-22 @ 16:29)  T(F): , Max: 98.3 (01-26-22 @ 16:29)  HR: 70 (01-27-22 @ 09:42)  BP: 138/54 (01-27-22 @ 09:42)  BP(mean): --  RR: 18 (01-27-22 @ 09:42)  SpO2: 97% (01-27-22 @ 09:42)  Wt(kg): --    01-26 @ 07:01 - 01-27 @ 07:00  --------------------------------------------------------  IN: 100 mL / OUT: 3050 mL / NET: -2950 mL    01-27 @ 07:01 - 01-27 @ 12:42  --------------------------------------------------------  IN: 0 mL / OUT: 550 mL / NET: -550 mL          Review of Systems:  all other ROS negative      PHYSICAL EXAM:  GENERAL: well-developed, well nourished, alert, no acute distress at present  CHEST/LUNG: Clear to auscultation bilaterally no rales, rhonchi or wheezing   HEART: normal S1S2, RRR  ABDOMEN: Soft, Nontender, +BS,   EXTREMITIES: No clubbing, cyanosis, or edema         LABS:                        8.0    4.59  )-----------( 154      ( 27 Jan 2022 07:49 )             24.1     01-27    128<L>  |  96  |  15  ----------------------------<  116<H>  4.1   |  23  |  1.07    Ca    8.7      27 Jan 2022 07:49  Phos  1.9     01-27  Mg     1.5     01-27    TPro  6.6  /  Alb  3.5  /  TBili  0.2  /  DBili  x   /  AST  11  /  ALT  10  /  AlkPhos  78  01-26          Osmolality, Random Urine: 392 mosm/kg (01-26 @ 15:21)  Sodium, Random Urine: 46 mmol/L (01-26 @ 15:21)  Creatinine, Random Urine: 60 mg/dL (01-26 @ 15:21)        RADIOLOGY & ADDITIONAL STUDIES:

## 2022-01-27 NOTE — DIETITIAN INITIAL EVALUATION ADULT. - OTHER INFO
pt is a 61 y/o male with HTN, DM c/b neuropathy, EtOH liver cirrhosis, toxic megacolon c/b pneumatosis s/p colectomy/ileostomy, and recent admission 1/6-1/19/2022 for weakness found to have acute renal failure requiring hemodialysis, initially presenting to [facility] with generalized weakess. Pt has had multiple admissions for renal failure with hyperkalemia. Central line placed in the right atrium for CRRT. pt is a 61 y/o male with HTN, DM c/b neuropathy, EtOH liver cirrhosis, toxic megacolon c/b pneumatosis s/p colectomy/ileostomy, and recent admission 1/6-1/19/2022 for weakness found to have acute renal failure requiring hemodialysis, initially presenting to [facility] with generalized weakess. Pt has had multiple admissions for renal failure with hyperkalemia. Central line placed in the right atrium for CRRT. As per Nephrology 1/25/22-Pt had HD 1/24/22 for clearance with rapid overcorrection of Na and normalization of hyperkalemia and acidosis; uop 700cc/24 hours; remains hemodynamically stable and transferred from MICU to [facility]. As per MD, pt reported feeling better after his HD session. 1/26- nephrology noted pt no longer having electrolyte abnormalities Na noted to be 130 in the AM and there was no need for HD on that day.    Pt seen in room sitting on chair. Reported having a good appetite during admission and prior. No preferences obtained. NKFA. Reported UBW: 175lb and lost 5lbs due to walking more often. CBW: 160lbs. Pt was not aware of CBW and unable to provide duration of weight loss and stated it could have been due to being more active. Hx of wt chart was 170lbs on July 2021- 10lbs (5.8%) weight loss x 6 months-not considered significant. Skin: Kendell: 15; Pressure Injury sacrum stage 2; Surgical scare; abd abrasion. pt made aware of wound and advised pt to consume adequate amounts of protein to aid in wound healing-pt verbalized understanding. No edema present. GI: colostomy bag 400ml 1/26/22. No N/V. Pain/discomfort in leg and provided with OTC medications as per flow sheet.

## 2022-01-27 NOTE — OCCUPATIONAL THERAPY INITIAL EVALUATION ADULT - DIAGNOSIS, OT EVAL
Pt presents with decreased R LE strength, diminished sensation to b/l LE, and decreased standing balance, impacting ability to perform functional mobility/ADLs

## 2022-01-27 NOTE — PROGRESS NOTE ADULT - PROBLEM SELECTOR PLAN 6
Patient reports symptoms of BPH including frequent urination along with sense of incomplete bladder emptying. On last admission, patient was discharged (1/19) with tamsulosin 0.4 mg qhs, since BPH might contribute to symptoms. Currently, patient is not endorsing any urinary complaints.   - Continue tamsulosin 0.4mg qhs

## 2022-01-27 NOTE — DISCHARGE NOTE PROVIDER - NSDCFUADDAPPT_GEN_ALL_CORE_FT
1. You have a follow-up appointment with your primary care physician Dr. Ovidio Pham on February 9th at 9:20 am. Please bring your hospital discharge paperwork to your appointment.  1. You have a follow-up appointment with your primary care physician Dr. Ovidio Pham on February 9th at 9:20 AM. Please bring your hospital discharge paperwork to your appointment.     2. You have a follow-up appointment with a pulmonologist Dr. Song on February 18th at 12:30 PM. Please bring your ID card and insurance card to your appointment. Please call the office if you are experiencing any COVID-19 symptoms or had exposure to anyone sick with COVID-19 before your appointment. The office is located on [address], [address] [place] [address].    2. You have a follow-up appointment with a nephrologist Dr. Ryan Mulligan on February 14th at 1:20 PM. Please bring your ID card and insurance card to your appointment. Please call the office if you are experiencing any COVID-19 symptoms or had exposure to anyone sick with COVID-19 before your appointment. The office is located on [address] [place] [address].

## 2022-01-27 NOTE — PROGRESS NOTE ADULT - PROBLEM SELECTOR PLAN 3
On X-ray 1/24, 5 mm hilar pulmonary nodule noted. There is a previous CT Chest from 3/30/2020 that noted a "10 mm nodule demonstrating eccentric calcification within the left upper lobe." Lung nodule as a questionable source of SIADH and patient's frequent presentation with hyponatremia Repeat non-con chest CT performed on 1/26. Stable 9mm nodule. Infectious vs post-inflammatory etiology. Per radiology report "correlation can be made with whole-body PET/CT and/or and/or  histological sampling to exclude malignancy" vs short internal surveillance. Given unchanged nodule, suspicion for malignancy remains low and we recommend short interval surveillance to confirm 2-year stability.   - patient previously did not seek follow-up imaging for this nodule  - recommend f/u imaging in 3-6 months to monitor

## 2022-01-27 NOTE — DISCHARGE NOTE PROVIDER - NSDCCPCAREPLAN_GEN_ALL_CORE_FT
PRINCIPAL DISCHARGE DIAGNOSIS  Diagnosis: Acute renal failure  Assessment and Plan of Treatment: You were admitted to the hospital because you were found have significant electrolyte abnormalities that were consistent with acute renal failure. You required urgent dialysis to correct these imbalances. You underwent one session of dialysis and we were able to take your hemodialysis catheter out safely. A soliz catheter was placed to ensure that you were producing urine. Your kidney functioning improved over the course of your admission and we were able to remove the soliz catheter. We want you to follow up with a kidney doctor (nephrologist) when you leave the hospital.   You have a follow-up appointment with nephrologist Dr. Ryan Mulligan on February 14th at 1:20 PM.  If you start to experience symptoms of weakness, confusion, dark urine, shortness of breath, swelling of the extremities, or chest pain, please call 911 or come to the emergency room.      SECONDARY DISCHARGE DIAGNOSES  Diagnosis: Pulmonary nodule, left  Assessment and Plan of Treatment: In the hospital, we took x-rays of your lungs and found a pulmonary nodule. We did some more imaging to take a better look at it and compare it to imaging you had before. You have had this nodule since March 2020. A pulmonary nodule, also called a lung nodule, is a "spot" on the lungs that is seen on a chest X-ray or CT scan.   A pulmonary nodule is caused by either:  1) A condition that is not cancer – In most cases, a pulmonary nodule is not cancer. It is a growth, small area of infection, or old scar in the lungs. Some medical terms for these things include "granuloma," which is an area of inflammation, and "hamartoma," which is a non-cancerous growth.  2) Cancer – A pulmonary nodule can be lung cancer, which is when normal cells in the lungs change into abnormal cells and grow out of control. It can also be cancer that started in another part of the body and then spread to the lungs.  We want you to follow-up with a lung doctor (pulmonologist) about this nodule to make sure it is not cancer. We scheduled an appointment for you.   You have a follow-up appointment with a pulmonologist Dr. Song on February 18th at 12:30 PM.      Diagnosis: Hyperkalemia  Assessment and Plan of Treatment:     Diagnosis: Hyponatremia  Assessment and Plan of Treatment:      PRINCIPAL DISCHARGE DIAGNOSIS  Diagnosis: Acute renal failure  Assessment and Plan of Treatment: You were admitted to the hospital because you were found have significant electrolyte abnormalities that were consistent with acute renal failure. You required urgent dialysis to correct these imbalances. You underwent one session of dialysis and we were able to take your hemodialysis catheter out safely. A soliz catheter was placed to ensure that you were producing urine. Your kidney functioning improved over the course of your admission and we were able to remove the soliz catheter. We want you to follow up with a kidney doctor (nephrologist) when you leave the hospital.   You have a follow-up appointment with nephrologist Dr. Ryan Mulligan on February 14th at 1:20 PM.  If you start to experience symptoms of weakness, confusion, dark urine, shortness of breath, swelling of the extremities, or chest pain, please call [phone] or come to the emergency room.      SECONDARY DISCHARGE DIAGNOSES  Diagnosis: Hyponatremia  Assessment and Plan of Treatment: You presented with a low sodium level. This was likely due to your medications such as SSRI and Hydrochlorothiazide. THESE MEDICATIONS WERE DISCONTINUED. PLEASE DO NOT TAKE THESE MEDICATIONS AS THEY WILL CAUSE AN ALTERATION IN YOUR SODIUM LEADING TO POSSIBLE ALTERED MENTAL STATUS, NERVE AND BRAIN DAMAGE, EVEN DEATH.    Diagnosis: Hyperkalemia  Assessment and Plan of Treatment:     Diagnosis: Pulmonary nodule, left  Assessment and Plan of Treatment: In the hospital, we took x-rays of your lungs and found a pulmonary nodule. We did some more imaging to take a better look at it and compare it to imaging you had before. You have had this nodule since March 2020. A pulmonary nodule, also called a lung nodule, is a "spot" on the lungs that is seen on a chest X-ray or CT scan.   A pulmonary nodule is caused by either:  1) A condition that is not cancer – In most cases, a pulmonary nodule is not cancer. It is a growth, small area of infection, or old scar in the lungs. Some medical terms for these things include "granuloma," which is an area of inflammation, and "hamartoma," which is a non-cancerous growth.  2) Cancer – A pulmonary nodule can be lung cancer, which is when normal cells in the lungs change into abnormal cells and grow out of control. It can also be cancer that started in another part of the body and then spread to the lungs.  We want you to follow-up with a lung doctor (pulmonologist) about this nodule to make sure it is not cancer. We scheduled an appointment for you.   You have a follow-up appointment with a pulmonologist Dr. Song on February 18th at 12:30 PM.

## 2022-01-27 NOTE — OCCUPATIONAL THERAPY INITIAL EVALUATION ADULT - ADDITIONAL COMMENTS
Pt lives at [facility], and performed all ADLs/functional mobility independently prior to admission. Pt uses a rollator for ambulation, however used no other AD/AE.

## 2022-01-27 NOTE — PROGRESS NOTE ADULT - ASSESSMENT
60yo M hx of ?etoh cirrhosis, HTN, DM2 with neuropathy, hx of toxic megacolon c/b pneumatosis s/p colectomy and ileostomy, admission 1 months ago for hyperkalemia and hyponatremia, presents with generalized weakness malaise and lightheadedness  found to be in ARF ; nephrology consulted for emergent hemodialysis    Assessment/Plan:   #non-oliguric MEG on CKD   #hyperkalemia  #hyponatremia, euvolemic  #non-anion gap acidosis     likely 2/2 ischemic ATN   recommend:  please repeat Deloris UOsm  hold SSRI/HCTZ  strict i's and o's  renal diet  please remove HD catheter   monitor hemodynamics    Yumiko Santos D.O  PGY 4 nephrology Fellow   [phone]

## 2022-01-27 NOTE — DISCHARGE NOTE PROVIDER - NSDCFUSCHEDAPPT_GEN_ALL_CORE_FT
STAURT DE LOS SANTOS ; 02/18/2022 ; [facility] Pul[address] STUART DE LOS SANTOS ; 02/14/2022 ; [facility] Nephro [address]  STUART DE LOS SANTOS ; 02/18/2022 ; [facility] PulmMed [address]

## 2022-01-27 NOTE — PROGRESS NOTE ADULT - ASSESSMENT
62M with HTN, DM c/b neuropathy, EtOH liver cirrhosis, toxic megacolon c/b pneumatosis s/p colectomy/ileostomy, and multiple admissions for ARF w/ hyperkalemia (most recently 1/6-1/19/2022, requiring HD),presented with weakness, found to have ARF and hyperkalemia w/ EKG changes. S/p emergent hemodialysis in ICU, with Na correction from 119->135 in 24 hours, s/p 6 mcg DDAVP IV and 2L D5W, electrolytes improving. Sodium stable at 128 with no neurological deficits.

## 2022-01-27 NOTE — DISCHARGE NOTE PROVIDER - HOSPITAL COURSE
#Discharge: do not delete    Patient is __ yo M/F with past medical history of _____ presented with _____, found to have _____ (one liner)    Hospital course (by problem):     Patient was discharged to: (home/[facility]/acute rehab/hospice, etc, and with what services – home health PT/RN? Home O2?)    New medications:   Changes to old medications:  Medications that were stopped:    Items to follow up as outpatient:    Physical exam at the time of discharge:       #Discharge: do not delete    62M with HTN, DM c/b neuropathy, EtOH liver cirrhosis, toxic megacolon c/b pneumatosis s/p colectomy/ileostomy, and multiple admissions for ARF w/ hyperkalemia (7/27-8/1/21 and 1/6-1/9/22), presenting to [facility] for generalized weakness, found to have acute renal failure with hyperK and EKG changes, admitted to MICU for emergent dialysis. In [facility] ED, pt received D5+insulin, lokelma, bicarb, albuterol, calcium gluconate, and then transferred to [facility] ICU. Upon arrival to MICU, pt remained hemodynamically stable, received emergent HD, with improvement in electrolyte derangements. Patient was transferred to [facility] for continued management of electrolyte derangements. Nephrology following for rapid correction of Na from 114 -> 135 in 11 hours. D5W bolus started and 2mcg DDAVP given. 6 hour repeat showed Na 128. Next repeat showed increased Na 130. 4mcg DDAVP given.     Hospital course (by problem):     Patient was discharged to: shelter ([facility])    New medications:   Changes to old medications:  Medications that were stopped: HCTZ, SSRI    Items to follow up as outpatient: Electrolytes    Physical exam at the time of discharge:       #Discharge: do not delete    62M with HTN, DM c/b neuropathy, EtOH liver cirrhosis, toxic megacolon c/b pneumatosis s/p colectomy/ileostomy, and multiple admissions for ARF w/ hyperkalemia (7/27-8/1/21 and 1/6-1/9/22), presenting to [facility] for generalized weakness, found to have acute renal failure with hyperK and EKG changes, admitted to MICU for emergent dialysis. In [facility] ED, pt received D5+insulin, lokelma, bicarb, albuterol, calcium gluconate, and then transferred to [facility] ICU. Upon arrival to MICU, pt remained hemodynamically stable, received emergent HD, with improvement in electrolyte derangements. Nephrology following for rapid correction of Na from 114 -> 135 in 11 hours. D5W bolus started and 2mcg DDAVP given. 6 hour repeat showed Na 128. Next repeat showed increased Na 130. 4mcg DDAVP given. Patient was transferred to [facility] for continued management of electrolyte derangements. On [facility], patient's metabolic derangements continued to improve with Na of 131 on day of discharge. HD catheter was removed and patient was stable for discharge to [facility].     Per chart review, a 9 mm left upper lobe pulmonary nodule was noted on 3/2020 Chest CT and a repeat CT was performed inpatient, notable for:   1.  No mediastinal or hilar lymphadenopathy although the study is somewhat limited due to the absence of intravenous contrast.  2.  Heavy coronary artery calcification.  3.  No change in a 9 mm coarsely calcified nodule within the left upper lobe which may bifurcate along its superior aspect. Its stability in configuration may suggest infectious and/or postinflammatory etiology however correlation can be made with whole-body PET/CT and/or  histological sampling to exclude malignancy. If this is not performed minimally additional short interval surveillance in 3-6 months to confirm greater than two-year stability is suggested.  4.  Stable focus of tubular nodular opacities in the subpleural region of the right upper lobe as well as a new similar focus of tubular shaped nodules within the right lower lobe. These are thought to be infectious and/or inflammatory in etiology. The largest nodule measuring up to 6 mm in the right upper lobe. This can also be reassessed on short surveillance imaging to confirm stability.    We arranged for patient to have outpatient follow-up with pulmonology for continued surveillance of his pulmonary lesions.     Hospital course (by problem):     Patient was discharged to: [facility]    New medications:   Changes to old medications:  Medications that were stopped: HCTZ, SSRI    Items to follow up as outpatient: Electrolytes, pulmonary nodule    Physical exam at the time of discharge:       #Discharge: do not delete    62M with HTN, DM c/b neuropathy, EtOH liver cirrhosis, toxic megacolon c/b pneumatosis s/p colectomy/ileostomy, and multiple admissions for ARF w/ hyperkalemia (7/27-8/1/21 and 1/6-1/9/22), presenting to [facility] for generalized weakness, found to have acute renal failure with hyperK and EKG changes, admitted to MICU for emergent dialysis. In [facility] ED, pt received D5+insulin, lokelma, bicarb, albuterol, calcium gluconate, and then transferred to [facility] ICU. Upon arrival to MICU, pt remained hemodynamically stable, received emergent HD, with improvement in electrolyte derangements. Nephrology following for rapid correction of Na from 114 -> 135 in 11 hours. D5W bolus started and 2mcg DDAVP given. 6 hour repeat showed Na 128. Next repeat showed increased Na 130. 4mcg DDAVP given. Patient was transferred to [facility] for continued management of electrolyte derangements. On [facility], patient's metabolic derangements continued to improve with Na of 131 on day of discharge. HD catheter was removed on 1/27/22 and patient was stable for discharge to [facility].     Per chart review, a 9 mm left upper lobe pulmonary nodule was noted on 3/2020 Chest CT and a repeat CT was performed inpatient, notable for:   1.  No mediastinal or hilar lymphadenopathy although the study is somewhat limited due to the absence of intravenous contrast.  2.  Heavy coronary artery calcification.  3.  No change in a 9 mm coarsely calcified nodule within the left upper lobe which may bifurcate along its superior aspect. Its stability in configuration may suggest infectious and/or postinflammatory etiology however correlation can be made with whole-body PET/CT and/or  histological sampling to exclude malignancy. If this is not performed minimally additional short interval surveillance in 3-6 months to confirm greater than two-year stability is suggested.  4.  Stable focus of tubular nodular opacities in the subpleural region of the right upper lobe as well as a new similar focus of tubular shaped nodules within the right lower lobe. These are thought to be infectious and/or inflammatory in etiology. The largest nodule measuring up to 6 mm in the right upper lobe. This can also be reassessed on short surveillance imaging to confirm stability.    We arranged for patient to have outpatient follow-up with pulmonology for continued surveillance of his pulmonary lesions.     Hospital course (by problem):     #Patient with ARF on admission, K 7.6, HCO3 8, BUN 87, Cr 4.39, etiology likely pre-renal with FeNa 0.7%, but given 3rd admission for ARF, further workup is warranted. R IJ HD catheter placed and patient received 1 episode of emergent HD. No evidence of fluid overload. Renal US negative with no renal masses, hydronephrosis or calculi and normal echogenicity bilaterally. Patient's electrolytes have been stable with improvement of BUN to 14 and Cr to 1.20 on day of discharge and HD catheter was removed.  - renal diet  - f/u outpatient with Dr. Ryan Mulligan (2/14/22 at 1:20 PM).     #Hyponatremia.   Na on admission 114, within 11 hrs corrected to 135.  Patient A&O x 4 with no focal neurological deficits.  S/p 6 mcg DDAVP and 2L D5W. Nephrology followed patient and closely monitored electrolyte balance. Patient's electrolytes have been stable with a sodium of 131 on day of discharge.  - discontinue home SSRI and hydrochlorothiazide  - recommend good PO intake due to potential electrolyte losses from ileostomy  - f/u outpatient with PCP Dr. Ovidio Pham (2/9/22 at 9:20 am)    #Pulmonary nodule, left.   On X-ray 1/24, 5 mm hilar pulmonary nodule noted. There is a previous CT Chest from 3/30/2020 that noted a "10 mm nodule demonstrating eccentric calcification within the left upper lobe." Lung nodule as a questionable source of SIADH and patient's frequent presentation with hyponatremia. Repeat non-contrast chest CT performed on 1/26. Stable 9mm nodule. Infectious vs post-inflammatory etiology. Per radiology report "correlation can be made with whole-body PET/CT and/or and/or  histological sampling to exclude malignancy" vs short internal surveillance. Given unchanged nodule, we recommend short interval surveillance to confirm 2-year stability and close outpatient pulmonology follow-up.   - patient previously did not seek follow-up imaging for this nodule  - f/u outpatient with pulmonologist Dr. Song (2/18/22 at 12:30 pm)    #At risk for nutrition deficiency.   Patient has a limited diet in shelter, avoids shelter food, eating only breakfast and dinner. Chart review from most recent admission revealed note from dietician/nutritional services with concern for weight loss and moderate protein-calorie malnutrition. Dietician consult recommended increased protein intake and thiamine supplement for patient.   - recommend increasing PO intake due to potential electrolyte losses from ileostomy    #Hyperkalemia.   K on admission 7.6, EKG with peaked T waves, s/p D5+insulin, lokelma. K now improved to 4.6 after dialysis. Resolved with normal potassium levels after dialysis.   - f/u outpatient with PCP Dr. Ovidio Pham (2/9/22 at 9:20 am)      #BPH with urinary obstruction.   Patient reports symptoms of BPH including frequent urination along with sense of incomplete bladder emptying. On last admission, patient was discharged (1/19) with tamsulosin 0.4 mg qhs, since BPH might contribute to symptoms. Currently, patient is not endorsing any urinary complaints.   - Continue tamsulosin 0.4mg qhs.    #Hypertension.   Patient with history of hypertension, home meds (from discharge 1/19): hydrALAZINE 50mg q8hrs, norvasc 10mg qd. Meds were initially held, but restarted in MICU, BP controlled.  - Continue home medications.    #HLD (hyperlipidemia).   Patient with history of HLD, home meds (from discharge 1/19): atorvastatin 10mg  - Continue atorvastatin 10mg qhs.    #ETOH abuse.   Patient with history of alcohol overuse and cirrhosis.  Currently not decompensated. Reports that last drink was 10 years ago.   - No acute interventions.    #History of creation of ostomy.   Patient with history of toxic megacolon c/b pneumatosis s/p colectomy/ileostomy. Ostomy output draining brown stool.    Patient was discharged to: [facility]    New medications:   Changes to old medications:  Medications that were stopped: HCTZ, SSRI    Items to follow up as outpatient: Electrolytes, pulmonary nodule    Physical exam at the time of discharge:    General: resting comfortably in bed, speaking in full sentences  HEENT: NC/AT; PERRL; EOMI; MMM, poor dentition with dental caries  Neck: supple; no lymphadenopathy  Cardiac: RRR; +S1/S2  Pulm: CTA B/L; no W/R/R  GI: soft, NT/ND, +BS, well-healed midline abdominal scar from colectomy, clean ileostomy bag with no stool, no ascites or fluid wave  Extremities: WWP; no edema, clubbing or cyanosis  Vasc: 2+ radial, DP pulses B/L  Neuro: AAOx3; no focal deficits #Discharge: do not delete    62M with HTN, DM c/b neuropathy, EtOH liver cirrhosis, toxic megacolon c/b pneumatosis s/p colectomy/ileostomy, and multiple admissions for ARF w/ hyperkalemia (7/27-8/1/21 and 1/6-1/9/22), presenting to [facility] for generalized weakness, found to have acute renal failure with hyperK and EKG changes, admitted to MICU for emergent dialysis. In [facility] ED, pt received D5+insulin, lokelma, bicarb, albuterol, calcium gluconate, and then transferred to [facility] ICU. Upon arrival to MICU, pt remained hemodynamically stable, received emergent HD, with improvement in electrolyte derangements. Nephrology following for rapid correction of Na from 114 -> 135 in 11 hours. D5W bolus started and 2mcg DDAVP given. 6 hour repeat showed Na 128. Next repeat showed increased Na 130. 4mcg DDAVP given. Patient was transferred to [facility] for continued management of electrolyte derangements. On [facility], patient's metabolic derangements continued to improve with Na of 133 on day of discharge. HD catheter was removed on 1/27/22 and patient was stable for discharge to [facility].     Per chart review, a 9 mm left upper lobe pulmonary nodule was noted on 3/2020 Chest CT and a repeat CT was performed inpatient, notable for:   1.  No mediastinal or hilar lymphadenopathy although the study is somewhat limited due to the absence of intravenous contrast.  2.  Heavy coronary artery calcification.  3.  No change in a 9 mm coarsely calcified nodule within the left upper lobe which may bifurcate along its superior aspect. Its stability in configuration may suggest infectious and/or postinflammatory etiology however correlation can be made with whole-body PET/CT and/or  histological sampling to exclude malignancy. If this is not performed minimally additional short interval surveillance in 3-6 months to confirm greater than two-year stability is suggested.  4.  Stable focus of tubular nodular opacities in the subpleural region of the right upper lobe as well as a new similar focus of tubular shaped nodules within the right lower lobe. These are thought to be infectious and/or inflammatory in etiology. The largest nodule measuring up to 6 mm in the right upper lobe. This can also be reassessed on short surveillance imaging to confirm stability.    We arranged for patient to have outpatient follow-up with pulmonology for continued surveillance of his pulmonary lesions.     Hospital course (by problem):     #Patient with ARF on admission, K 7.6, HCO3 8, BUN 87, Cr 4.39, etiology likely pre-renal with FeNa 0.7%, but given 3rd admission for ARF, further workup is warranted. R IJ HD catheter placed and patient received 1 episode of emergent HD. No evidence of fluid overload. Renal US negative with no renal masses, hydronephrosis or calculi and normal echogenicity bilaterally. Patient's electrolytes have been stable with improvement of BUN to 14 and Cr to 1.20 on day of discharge and HD catheter was removed.  - renal diet  - fluid restriction of 1500 mL  - f/u outpatient with Dr. Ryan Mulligan (2/14/22 at 1:20 PM).     #Hyponatremia.   Na on admission 114, within 11 hrs corrected to 135.  Patient A&O x 4 with no focal neurological deficits.  S/p 6 mcg DDAVP and 2L D5W. Nephrology followed patient and closely monitored electrolyte balance. Patient's electrolytes have been stable with a sodium of 131 on day of discharge.  - discontinue home SSRI and hydrochlorothiazide  - recommend good PO intake due to potential electrolyte losses from ileostomy  - f/u outpatient with PCP Dr. Ovidio Pham (2/9/22 at 9:20 am)    #Pulmonary nodule, left.   On X-ray 1/24, 5 mm hilar pulmonary nodule noted. There is a previous CT Chest from 3/30/2020 that noted a "10 mm nodule demonstrating eccentric calcification within the left upper lobe." Lung nodule as a questionable source of SIADH and patient's frequent presentation with hyponatremia. Repeat non-contrast chest CT performed on 1/26. Stable 9mm nodule. Infectious vs post-inflammatory etiology. Per radiology report "correlation can be made with whole-body PET/CT and/or and/or  histological sampling to exclude malignancy" vs short internal surveillance. Given unchanged nodule, we recommend short interval surveillance to confirm 2-year stability and close outpatient pulmonology follow-up.   - patient previously did not seek follow-up imaging for this nodule  - f/u outpatient with pulmonologist Dr. Song (2/18/22 at 12:30 pm)    #At risk for nutrition deficiency.   Patient has a limited diet in shelter, avoids shelter food, eating only breakfast and dinner. Chart review from most recent admission revealed note from dietician/nutritional services with concern for weight loss and moderate protein-calorie malnutrition. Dietician consult recommended increased protein intake and thiamine supplement for patient.   - recommend increasing PO intake due to potential electrolyte losses from ileostomy    #Hyperkalemia.   K on admission 7.6, EKG with peaked T waves, s/p D5+insulin, lokelma. K now improved to 4.6 after dialysis. Resolved with normal potassium levels after dialysis.   - f/u outpatient with PCP Dr. Ovidio Pham (2/9/22 at 9:20 am)      #BPH with urinary obstruction.   Patient reports symptoms of BPH including frequent urination along with sense of incomplete bladder emptying. On last admission, patient was discharged (1/19) with tamsulosin 0.4 mg qhs, since BPH might contribute to symptoms. Currently, patient is not endorsing any urinary complaints.   - Continue tamsulosin 0.4mg qhs.    #Hypertension.   Patient with history of hypertension, home meds (from discharge 1/19): hydrALAZINE 50mg q8hrs, norvasc 10mg qd. Meds were initially held, but restarted in MICU, BP controlled.  - Continue home medications.    #HLD (hyperlipidemia).   Patient with history of HLD, home meds (from discharge 1/19): atorvastatin 10mg  - Continue atorvastatin 10mg qhs.    #ETOH abuse.   Patient with history of alcohol overuse and cirrhosis.  Currently not decompensated. Reports that last drink was 10 years ago.   - No acute interventions.    #History of creation of ostomy.   Patient with history of toxic megacolon c/b pneumatosis s/p colectomy/ileostomy. Ostomy output draining brown stool.    Patient was discharged to: [facility]    New medications:   Changes to old medications:  Medications that were stopped: HCTZ, SSRI    Items to follow up as outpatient: Electrolytes, pulmonary nodule    Physical exam at the time of discharge:    General: resting comfortably in bed, speaking in full sentences  HEENT: NC/AT; PERRL; EOMI; MMM, poor dentition with dental caries  Neck: supple; no lymphadenopathy  Cardiac: RRR; +S1/S2  Pulm: CTA B/L; no W/R/R  GI: soft, NT/ND, +BS, well-healed midline abdominal scar from colectomy, clean ileostomy bag with no stool, no ascites or fluid wave  Extremities: WWP; no edema, clubbing or cyanosis  Vasc: 2+ radial, DP pulses B/L  Neuro: AAOx3; no focal deficits #Discharge: do not delete    62M with HTN, DM c/b neuropathy, EtOH liver cirrhosis, toxic megacolon c/b pneumatosis s/p colectomy/ileostomy, and multiple admissions for ARF w/ hyperkalemia (7/27-8/1/21 and 1/6-1/9/22), presenting to [facility] for generalized weakness, found to have acute renal failure with hyperK and EKG changes, admitted to MICU for emergent dialysis. In [facility] ED, pt received D5+insulin, lokelma, bicarb, albuterol, calcium gluconate, and then transferred to [facility] ICU. Upon arrival to MICU, pt remained hemodynamically stable, received emergent HD, with improvement in electrolyte derangements. Nephrology following for rapid correction of Na from 114 -> 135 in 11 hours. D5W bolus started and 2mcg DDAVP given. 6 hour repeat showed Na 128. Next repeat showed increased Na 130. 4mcg DDAVP given. Patient was transferred to [facility] for continued management of electrolyte derangements. On [facility], patient's metabolic derangements continued to improve with Na of 133 on day of discharge. HD catheter was removed on 1/27/22 and patient was stable for discharge to [facility].     Per chart review, a 9 mm left upper lobe pulmonary nodule was noted on 3/2020 Chest CT and a repeat CT was performed inpatient, notable for:   1.  No mediastinal or hilar lymphadenopathy although the study is somewhat limited due to the absence of intravenous contrast.  2.  Heavy coronary artery calcification.  3.  No change in a 9 mm coarsely calcified nodule within the left upper lobe which may bifurcate along its superior aspect. Its stability in configuration may suggest infectious and/or postinflammatory etiology however correlation can be made with whole-body PET/CT and/or  histological sampling to exclude malignancy. If this is not performed minimally additional short interval surveillance in 3-6 months to confirm greater than two-year stability is suggested.  4.  Stable focus of tubular nodular opacities in the subpleural region of the right upper lobe as well as a new similar focus of tubular shaped nodules within the right lower lobe. These are thought to be infectious and/or inflammatory in etiology. The largest nodule measuring up to 6 mm in the right upper lobe. This can also be reassessed on short surveillance imaging to confirm stability.    We arranged for patient to have outpatient follow-up with pulmonology for continued surveillance of his pulmonary lesions.     Hospital course (by problem):     #Patient with ARF on admission, K 7.6, HCO3 8, BUN 87, Cr 4.39, etiology likely pre-renal with FeNa 0.7%, but given 3rd admission for ARF, further workup is warranted. R IJ HD catheter placed and patient received 1 episode of emergent HD. No evidence of fluid overload. Renal US negative with no renal masses, hydronephrosis or calculi and normal echogenicity bilaterally. Patient's electrolytes have been stable with improvement of BUN to 14 and Cr to 1.20 on day of discharge and HD catheter was removed.  - renal diet  - fluid restriction of 1500 mL  - f/u outpatient with Dr. Ryan Mulligan (2/14/22 at 1:20 PM).     #Hyponatremia.   Na on admission 114, within 11 hrs corrected to 135.  Patient A&O x 4 with no focal neurological deficits.  S/p 6 mcg DDAVP and 2L D5W. Nephrology followed patient and closely monitored electrolyte balance. Patient's electrolytes have been stable with a sodium of 131 on day of discharge.  - ********discontinue home SSRI and hydrochlorothiazide*******  - recommend good PO intake due to potential electrolyte losses from ileostomy  - f/u outpatient with PCP Dr. Ovidio Pham (2/9/22 at 9:20 am)    #Pulmonary nodule, left.   On X-ray 1/24, 5 mm hilar pulmonary nodule noted. There is a previous CT Chest from 3/30/2020 that noted a "10 mm nodule demonstrating eccentric calcification within the left upper lobe." Lung nodule as a questionable source of SIADH and patient's frequent presentation with hyponatremia. Repeat non-contrast chest CT performed on 1/26. Stable 9mm nodule. Infectious vs post-inflammatory etiology. Per radiology report "correlation can be made with whole-body PET/CT and/or and/or  histological sampling to exclude malignancy" vs short internal surveillance. Given unchanged nodule, we recommend short interval surveillance to confirm 2-year stability and close outpatient pulmonology follow-up.   - patient previously did not seek follow-up imaging for this nodule  - f/u outpatient with pulmonologist Dr. Song (2/18/22 at 12:30 pm)    #At risk for nutrition deficiency.   Patient has a limited diet in shelter, avoids shelter food, eating only breakfast and dinner. Chart review from most recent admission revealed note from dietician/nutritional services with concern for weight loss and moderate protein-calorie malnutrition. Dietician consult recommended increased protein intake and thiamine supplement for patient.   - recommend increasing PO intake due to potential electrolyte losses from ileostomy    #Hyperkalemia.   K on admission 7.6, EKG with peaked T waves, s/p D5+insulin, lokelma. K now improved to 4.6 after dialysis. Resolved with normal potassium levels after dialysis.   - f/u outpatient with PCP Dr. Ovidio Pham (2/9/22 at 9:20 am)      #BPH with urinary obstruction.   Patient reports symptoms of BPH including frequent urination along with sense of incomplete bladder emptying. On last admission, patient was discharged (1/19) with tamsulosin 0.4 mg qhs, since BPH might contribute to symptoms. Currently, patient is not endorsing any urinary complaints.   - Continue tamsulosin 0.4mg qhs.    #Hypertension.   Patient with history of hypertension, home meds (from discharge 1/19): hydrALAZINE 50mg q8hrs, norvasc 10mg qd. Meds were initially held, but restarted in MICU, BP controlled.  - Continue home medications.    #HLD (hyperlipidemia).   Patient with history of HLD, home meds (from discharge 1/19): atorvastatin 10mg  - Continue atorvastatin 10mg qhs.    #ETOH abuse.   Patient with history of alcohol overuse and cirrhosis.  Currently not decompensated. Reports that last drink was 10 years ago.   - No acute interventions.    #History of creation of ostomy.   Patient with history of toxic megacolon c/b pneumatosis s/p colectomy/ileostomy. Ostomy output draining brown stool.    Patient was discharged to: [facility]    New medications:   Changes to old medications:  Medications that were stopped: HCTZ, SSRI    Items to follow up as outpatient: Electrolytes, pulmonary nodule    Physical exam at the time of discharge:    General: resting comfortably in bed, speaking in full sentences  HEENT: NC/AT; PERRL; EOMI; MMM, poor dentition with dental caries  Neck: supple; no lymphadenopathy  Cardiac: RRR; +S1/S2  Pulm: CTA B/L; no W/R/R  GI: soft, NT/ND, +BS, well-healed midline abdominal scar from colectomy, clean ileostomy bag with no stool, no ascites or fluid wave  Extremities: WWP; no edema, clubbing or cyanosis  Vasc: 2+ radial, DP pulses B/L  Neuro: AAOx3; no focal deficits #Discharge: do not delete    62M with HTN, DM c/b neuropathy, EtOH liver cirrhosis, toxic megacolon c/b pneumatosis s/p colectomy/ileostomy, and multiple admissions for ARF w/ hyperkalemia (7/27-8/1/21 and 1/6-1/9/22), presenting to [facility] for generalized weakness, found to have acute renal failure with hyperK and EKG changes, admitted to MICU for emergent dialysis. In [facility] ED, pt received D5+insulin, lokelma, bicarb, albuterol, calcium gluconate, and then transferred to [facility] ICU. Upon arrival to MICU, pt remained hemodynamically stable, received emergent HD, with improvement in electrolyte derangements. Nephrology following for rapid correction of Na from 114 -> 135 in 11 hours. D5W bolus started and 2mcg DDAVP given. 6 hour repeat showed Na 128. Next repeat showed increased Na 130. 4mcg DDAVP given. Patient was transferred to [facility] for continued management of electrolyte derangements. On [facility], patient's metabolic derangements continued to improve with Na of 133 on day of discharge. HD catheter was removed on 1/27/22 and patient was stable for discharge to [facility].     Per chart review, a 9 mm left upper lobe pulmonary nodule was noted on 3/2020 Chest CT and a repeat CT was performed inpatient, notable for:   1.  No mediastinal or hilar lymphadenopathy although the study is somewhat limited due to the absence of intravenous contrast.  2.  Heavy coronary artery calcification.  3.  No change in a 9 mm coarsely calcified nodule within the left upper lobe which may bifurcate along its superior aspect. Its stability in configuration may suggest infectious and/or postinflammatory etiology however correlation can be made with whole-body PET/CT and/or  histological sampling to exclude malignancy. If this is not performed minimally additional short interval surveillance in 3-6 months to confirm greater than two-year stability is suggested.  4.  Stable focus of tubular nodular opacities in the subpleural region of the right upper lobe as well as a new similar focus of tubular shaped nodules within the right lower lobe. These are thought to be infectious and/or inflammatory in etiology. The largest nodule measuring up to 6 mm in the right upper lobe. This can also be reassessed on short surveillance imaging to confirm stability.    We arranged for patient to have outpatient follow-up with pulmonology for continued surveillance of his pulmonary lesions.     Hospital course (by problem):     #Patient with ARF on admission, K 7.6, HCO3 8, BUN 87, Cr 4.39, etiology likely pre-renal with FeNa 0.7%, but given 3rd admission for ARF, further workup is warranted. R IJ HD catheter placed and patient received 1 episode of emergent HD. No evidence of fluid overload. Renal US negative with no renal masses, hydronephrosis or calculi and normal echogenicity bilaterally. Patient's electrolytes have been stable with improvement of BUN to 14 and Cr to 1.20 on day of discharge and HD catheter was removed.  - renal diet  - fluid restriction of 1500 mL  - f/u outpatient with Dr. Ryan Mulligan (2/14/22 at 1:20 PM).     #Hyponatremia.   Na on admission 114, within 11 hrs corrected to 135.  Patient A&O x 4 with no focal neurological deficits.  S/p 6 mcg DDAVP and 2L D5W. Nephrology followed patient and closely monitored electrolyte balance. Patient's electrolytes have been stable with a sodium of 131 on day of discharge.  - ********discontinue home SSRI and hydrochlorothiazide*******  - recommend good PO intake due to potential electrolyte losses from ileostomy  - f/u outpatient with PCP Dr. Ovidio Pham (2/9/22 at 9:20 am)    #Pulmonary nodule, left.   On X-ray 1/24, 5 mm hilar pulmonary nodule noted. There is a previous CT Chest from 3/30/2020 that noted a "10 mm nodule demonstrating eccentric calcification within the left upper lobe." Lung nodule as a questionable source of SIADH and patient's frequent presentation with hyponatremia. Repeat non-contrast chest CT performed on 1/26. Stable 9mm nodule. Infectious vs post-inflammatory etiology. Per radiology report "correlation can be made with whole-body PET/CT and/or and/or  histological sampling to exclude malignancy" vs short internal surveillance. Given unchanged nodule, we recommend short interval surveillance to confirm 2-year stability and close outpatient pulmonology follow-up.   - patient previously did not seek follow-up imaging for this nodule  - f/u outpatient with pulmonologist Dr. Song (2/18/22 at 12:30 pm)    #At risk for nutrition deficiency.   Patient has a limited diet in shelter, avoids shelter food, eating only breakfast and dinner. Chart review from most recent admission revealed note from dietician/nutritional services with concern for weight loss and moderate protein-calorie malnutrition. Dietician consult recommended increased protein intake and thiamine supplement for patient.   - recommend increasing PO intake due to potential electrolyte losses from ileostomy    #Hyperkalemia.   K on admission 7.6, EKG with peaked T waves, s/p D5+insulin, lokelma. K now improved to 4.6 after dialysis. Resolved with normal potassium levels after dialysis.   - f/u outpatient with PCP Dr. Ovidio Pham (2/9/22 at 9:20 am)      #BPH with urinary obstruction.   Patient reports symptoms of BPH including frequent urination along with sense of incomplete bladder emptying. On last admission, patient was discharged (1/19) with tamsulosin 0.4 mg qhs, since BPH might contribute to symptoms. Currently, patient is not endorsing any urinary complaints.   - Continue tamsulosin 0.4mg qhs.    #Hypertension.   Patient with history of hypertension, home meds (from discharge 1/19): hydrALAZINE 50mg q8hrs, norvasc 10mg qd. Meds were initially held, but restarted in MICU, BP controlled.  - Continue home medications.    #HLD (hyperlipidemia).   Patient with history of HLD, home meds (from discharge 1/19): atorvastatin 10mg  - Continue atorvastatin 10mg qhs.    #ETOH abuse.   Patient with history of alcohol overuse and cirrhosis.  Currently not decompensated. Reports that last drink was 10 years ago.   - No acute interventions.    #History of creation of ostomy.   Patient with history of toxic megacolon c/b pneumatosis s/p colectomy/ileostomy. Ostomy output draining brown stool.    Patient was discharged to: [facility]    New medications:   Changes to old medications:  Medications that were stopped: *****HCTZ, SSRI****    Items to follow up as outpatient: Electrolytes, pulmonary nodule    Physical exam at the time of discharge:    General: resting comfortably in bed, speaking in full sentences  HEENT: NC/AT; PERRL; EOMI; MMM, poor dentition with dental caries  Neck: supple; no lymphadenopathy  Cardiac: RRR; +S1/S2  Pulm: CTA B/L; no W/R/R  GI: soft, NT/ND, +BS, well-healed midline abdominal scar from colectomy, clean ileostomy bag with no stool, no ascites or fluid wave  Extremities: WWP; no edema, clubbing or cyanosis  Vasc: 2+ radial, DP pulses B/L  Neuro: AAOx3; no focal deficits

## 2022-01-27 NOTE — PROGRESS NOTE ADULT - PROBLEM SELECTOR PLAN 10
Patient with history of toxic megacolon c/b pneumatosis s/p colectomy/ileostomy. Ostomy output draining brown stool.  - No acute interventions, continuing to monitor

## 2022-01-27 NOTE — PROGRESS NOTE ADULT - PROBLEM SELECTOR PLAN 8
2.47 Patient with history of HLD, home meds (from discharge 1/19): atorvastatin 10mg  - Continue atorvastatin 10mg qhs

## 2022-01-27 NOTE — OCCUPATIONAL THERAPY INITIAL EVALUATION ADULT - GENERAL OBSERVATIONS, REHAB EVAL
Pt received sitting in chair, NAD, +heplock, +soliz, +colostomy bag. Pt A&Ox4, agreeable to OT, and tolerated session well.

## 2022-01-27 NOTE — DIETITIAN INITIAL EVALUATION ADULT. - PERTINENT MEDS FT
Heparin,   Insulin (Lantus/admelog)   Gabapentin  Atorvastatin   Ferrous sulfate  levothyroxine   Pantoprazole

## 2022-01-27 NOTE — PROGRESS NOTE ADULT - SUBJECTIVE AND OBJECTIVE BOX
SUBJECTIVE/OVERNIGHT EVENTS: No acute overnight events. Patient received melatonin to aid in sleep.   Pt seen in AM at bedside, resting comfortably in bed, and does not appear to be in any acute distress. He endorses tingling pain on the toes of his right lower extremity and is requesting more gabapentin. When asked, pt denies any recent or active fever, chills, nausea, vomiting, headache, acute sob, chest pain, abdominal pain, or genitourinary sx.    VITAL SIGNS:  Vital Signs Last 24 Hrs  T(C): 36.5 (27 Jan 2022 09:42), Max: 36.8 (26 Jan 2022 16:29)  T(F): 97.7 (27 Jan 2022 09:42), Max: 98.3 (26 Jan 2022 16:29)  HR: 70 (27 Jan 2022 09:42) (68 - 78)  BP: 138/54 (27 Jan 2022 09:42) (138/54 - 164/70)  BP(mean): --  RR: 18 (27 Jan 2022 09:42) (18 - 18)  SpO2: 97% (27 Jan 2022 09:42) (97% - 99%)    PHYSICAL EXAM:  General: resting comfortably in bed, speaking in full sentences  HEENT: NC/AT; PERRL; EOMI; MMM, poor dentition with dental caries  Neck: supple; Right-sided IJ catheter in place, covered by gauze and tegaderm  Cardiac: RRR; +S1/S2  Pulm: CTA B/L; no W/R/R  GI: soft, NT/ND, +BS, well-healed midline abdominal scar from colectomy, clean ileostomy bag with no stool, no ascites or fluid wave  Extremities: WWP; no edema, clubbing or cyanosis  Vasc: 2+ radial, DP pulses B/L  : soliz in place  Neuro: AAOx3; no focal deficits  Psych: flat affect     MEDICATIONS:  MEDICATIONS  (STANDING):  amLODIPine   Tablet 10 milliGRAM(s) Oral every 24 hours  atorvastatin 10 milliGRAM(s) Oral at bedtime  chlorhexidine 2% Cloths 1 Application(s) Topical <User Schedule>  dextrose 40% Gel 15 Gram(s) Oral once  dextrose 5%. 1000 milliLiter(s) (50 mL/Hr) IV Continuous <Continuous>  dextrose 5%. 1000 milliLiter(s) (100 mL/Hr) IV Continuous <Continuous>  dextrose 5%. 1000 milliLiter(s) (1000 mL/Hr) IV Continuous <Continuous>  dextrose 50% Injectable 25 Gram(s) IV Push once  dextrose 50% Injectable 12.5 Gram(s) IV Push once  dextrose 50% Injectable 25 Gram(s) IV Push once  DULoxetine 30 milliGRAM(s) Oral daily  ferrous    sulfate 325 milliGRAM(s) Oral daily  gabapentin 100 milliGRAM(s) Oral three times a day  glucagon  Injectable 1 milliGRAM(s) IntraMuscular once  heparin   Injectable 5000 Unit(s) SubCutaneous every 8 hours  hydrALAZINE 50 milliGRAM(s) Oral every 8 hours  insulin glargine Injectable (LANTUS) 3 Unit(s) SubCutaneous at bedtime  insulin lispro (ADMELOG) corrective regimen sliding scale   SubCutaneous Before meals and at bedtime  insulin lispro Injectable (ADMELOG) 2 Unit(s) SubCutaneous three times a day before meals  levothyroxine 50 MICROGram(s) Oral daily  pantoprazole    Tablet 40 milliGRAM(s) Oral before breakfast  tamsulosin 0.4 milliGRAM(s) Oral at bedtime  traZODone 50 milliGRAM(s) Oral at bedtime    MEDICATIONS  (PRN):  acetaminophen     Tablet .. 650 milliGRAM(s) Oral every 6 hours PRN Temp greater or equal to 38C (100.4F), Mild Pain (1 - 3)      ALLERGIES:  Allergies    No Known Allergies    Intolerances        LABS:                        8.0    4.59  )-----------( 154      ( 27 Jan 2022 07:49 )             24.1     01-27    128<L>  |  96  |  15  ----------------------------<  116<H>  4.1   |  23  |  1.07    Ca    8.7      27 Jan 2022 07:49  Phos  1.9     01-27  Mg     1.5     01-27    TPro  6.6  /  Alb  3.5  /  TBili  0.2  /  DBili  x   /  AST  11  /  ALT  10  /  AlkPhos  78  01-26        RADIOLOGY & ADDITIONAL TESTS: Reviewed. SUBJECTIVE/OVERNIGHT EVENTS: No acute overnight events. Patient received melatonin to aid in sleep.   Pt seen in AM at bedside, resting comfortably in bed, and does not appear to be in any acute distress. He endorses tingling pain on the toes of his right lower extremity and is requesting more gabapentin. When asked, pt denies any recent or active fever, chills, nausea, vomiting, headache, acute sob, chest pain, abdominal pain, or genitourinary sx.    (has post-hospital discharge appt set up with his internist Dr. Ovidio Pham - Feb 9th 9:20 am at [place])  VITAL SIGNS:  Vital Signs Last 24 Hrs  T(C): 36.5 (27 Jan 2022 09:42), Max: 36.8 (26 Jan 2022 16:29)  T(F): 97.7 (27 Jan 2022 09:42), Max: 98.3 (26 Jan 2022 16:29)  HR: 70 (27 Jan 2022 09:42) (68 - 78)  BP: 138/54 (27 Jan 2022 09:42) (138/54 - 164/70)  BP(mean): --  RR: 18 (27 Jan 2022 09:42) (18 - 18)  SpO2: 97% (27 Jan 2022 09:42) (97% - 99%)    PHYSICAL EXAM:  General: resting comfortably in bed, speaking in full sentences  HEENT: NC/AT; PERRL; EOMI; MMM, poor dentition with dental caries  Neck: supple; Right-sided IJ catheter in place, covered by gauze and tegaderm  Cardiac: RRR; +S1/S2  Pulm: CTA B/L; no W/R/R  GI: soft, NT/ND, +BS, well-healed midline abdominal scar from colectomy, clean ileostomy bag with no stool, no ascites or fluid wave  Extremities: WWP; no edema, clubbing or cyanosis  Vasc: 2+ radial, DP pulses B/L  : soliz in place  Neuro: AAOx3; no focal deficits  Psych: flat affect     MEDICATIONS:  MEDICATIONS  (STANDING):  amLODIPine   Tablet 10 milliGRAM(s) Oral every 24 hours  atorvastatin 10 milliGRAM(s) Oral at bedtime  chlorhexidine 2% Cloths 1 Application(s) Topical <User Schedule>  dextrose 40% Gel 15 Gram(s) Oral once  dextrose 5%. 1000 milliLiter(s) (50 mL/Hr) IV Continuous <Continuous>  dextrose 5%. 1000 milliLiter(s) (100 mL/Hr) IV Continuous <Continuous>  dextrose 5%. 1000 milliLiter(s) (1000 mL/Hr) IV Continuous <Continuous>  dextrose 50% Injectable 25 Gram(s) IV Push once  dextrose 50% Injectable 12.5 Gram(s) IV Push once  dextrose 50% Injectable 25 Gram(s) IV Push once  DULoxetine 30 milliGRAM(s) Oral daily  ferrous    sulfate 325 milliGRAM(s) Oral daily  gabapentin 100 milliGRAM(s) Oral three times a day  glucagon  Injectable 1 milliGRAM(s) IntraMuscular once  heparin   Injectable 5000 Unit(s) SubCutaneous every 8 hours  hydrALAZINE 50 milliGRAM(s) Oral every 8 hours  insulin glargine Injectable (LANTUS) 3 Unit(s) SubCutaneous at bedtime  insulin lispro (ADMELOG) corrective regimen sliding scale   SubCutaneous Before meals and at bedtime  insulin lispro Injectable (ADMELOG) 2 Unit(s) SubCutaneous three times a day before meals  levothyroxine 50 MICROGram(s) Oral daily  pantoprazole    Tablet 40 milliGRAM(s) Oral before breakfast  tamsulosin 0.4 milliGRAM(s) Oral at bedtime  traZODone 50 milliGRAM(s) Oral at bedtime    MEDICATIONS  (PRN):  acetaminophen     Tablet .. 650 milliGRAM(s) Oral every 6 hours PRN Temp greater or equal to 38C (100.4F), Mild Pain (1 - 3)      ALLERGIES:  Allergies    No Known Allergies    Intolerances        LABS:                        8.0    4.59  )-----------( 154      ( 27 Jan 2022 07:49 )             24.1     01-27    128<L>  |  96  |  15  ----------------------------<  116<H>  4.1   |  23  |  1.07    Ca    8.7      27 Jan 2022 07:49  Phos  1.9     01-27  Mg     1.5     01-27    TPro  6.6  /  Alb  3.5  /  TBili  0.2  /  DBili  x   /  AST  11  /  ALT  10  /  AlkPhos  78  01-26        RADIOLOGY & ADDITIONAL TESTS: Reviewed.

## 2022-01-27 NOTE — PROGRESS NOTE ADULT - PROBLEM SELECTOR PLAN 2
Na on admission 114, within 24 hrs corrected to 135.  Patient A&O x 4 with no focal neurological deficits.  S/p 6 mcg DDAVP and 2L D5W. Renal following patient. Na is 128 -> 130 on AM labs 1/26. Today AM (1/27) Na is 128. Patient's electrolytes have been stable.  - appreciate renal recommendations   - Monitor renal function with BMP  - hold SSRI/HCTZ

## 2022-01-27 NOTE — PROGRESS NOTE ADULT - PROBLEM SELECTOR PLAN 9
Patient with history of alcohol overuse and cirrhosis.  Currently not decompensated. Reports that last drink was 10 years ago.   - No acute interventions

## 2022-01-27 NOTE — OCCUPATIONAL THERAPY INITIAL EVALUATION ADULT - PERTINENT HX OF CURRENT PROBLEM, REHAB EVAL
62M with HTN, DM c/b neuropathy, EtOH liver cirrhosis, toxic megacolon c/b pneumatosis s/p colectomy/ileostomy, and recent admission 1/6-1/19/2022 for weakness found to have acute renal failure requiring hemodialysis, initially presenting to [facility] with generalized weakess.

## 2022-01-27 NOTE — DISCHARGE NOTE PROVIDER - CARE PROVIDER_API CALL
Ovidio Pham  [facility] [address] [place] [address]  Phone: [phone]  Fax: (   )    -  Established Patient  Scheduled Appointment: 02/09/2022 09:20 AM   Karen Song)  Internal Medicine; Pulmonary Disease  [address] [address]  Phone: [phone]  Fax: [phone]  Scheduled Appointment: 02/18/2022 12:30 PM    Ovidio Pham  [address] [place] [address]  Phone: [phone]  Fax: (   )    -  Established Patient  Scheduled Appointment: 02/09/2022 09:20 AM    Ryan Mulligan)  Internal Medicine; Nephrology  [address], [address] [place] [address]  Phone: [phone]  Fax: [phone]  Scheduled Appointment: 02/14/2022 01:20 PM

## 2022-01-27 NOTE — PROGRESS NOTE ADULT - PROBLEM SELECTOR PLAN 7
Patient with history of hypertension, home meds (from discharge 1/19): hydrALAZINE 50mg q8hrs, norvasc 10mg qd. Meds were initially held, but restarted in MICU, BP controlled.  - Continue home medications

## 2022-01-27 NOTE — PROGRESS NOTE ADULT - PROBLEM SELECTOR PLAN 11
Patient with history of DM complicated by peripheral neuropathy, A1c is 6.7 from 1/6/22. Home meds (from discharge 1/19): Janumet 50 mg-500 mg BID. On gabapentin 300mg TID for peripheral neuropathy. As gabapentin requires dose adjustment for dialysis, 100 mg dose was maintained. Patient complaining of neuropathic pain today.   - Holding oral diabetes medications  - Lantus 3 units, Lispo 2 units TID, and continue mISS for coverage (based on last admission)

## 2022-01-27 NOTE — DIETITIAN INITIAL EVALUATION ADULT. - ADD RECOMMEND
1. Continue current diet order 2. Monitor PO/hydration status 3. Monitors labs 4. Monitor weekly weights 5. Rec a Thiamine supplement

## 2022-01-27 NOTE — OCCUPATIONAL THERAPY INITIAL EVALUATION ADULT - MODALITIES TREATMENT COMMENTS
Pt able to ambulate ~150ft using rollator, requiring Sup 2/2 unsteadiness/decreased balance due to impaired sensation to b/l feet as well as decreased R LE strength.

## 2022-01-27 NOTE — DISCHARGE NOTE PROVIDER - PROVIDER TOKENS
FREE:[LAST:[Vero],FIRST:[Ovidio],PHONE:[phone]:[(   )    -],ADDRESS:[[address] [place] [address]],SCHEDULEDAPPT:[02/09/2022],SCHEDULEDAPPTTIME:[09:20 AM],ESTABLISHEDPATIENT:[T]] PROVIDER:[TOKEN:[[id number]],SCHEDULEDAPPT:[02/18/2022],SCHEDULEDAPPTTIME:[12:30 PM]],FREE:[LAST:[Vero],FIRST:[Ovidio],PHONE:[phone]:[(   )    -],ADDRESS:[[address] [place] [address]],SCHEDULEDAPPT:[02/09/2022],SCHEDULEDAPPTTIME:[09:20 AM],ESTABLISHEDPATIENT:[T]],PROVIDER:[TOKEN:[[id number]],SCHEDULEDAPPT:[02/14/2022],SCHEDULEDAPPTTIME:[01:20 PM]]

## 2022-01-27 NOTE — DISCHARGE NOTE PROVIDER - NSDCMRMEDTOKEN_GEN_ALL_CORE_FT
atorvastatin 10 mg oral tablet: 1 tab(s) orally once a day   DULoxetine 30 mg oral delayed release capsule: 1  orally once a day (at bedtime)  ferrous sulfate 325 mg (65 mg elemental iron) oral tablet: 1  orally once a day  hydrALAZINE 50 mg oral tablet: 1 tab(s) orally every 8 hours  metFORMIN 500 mg oral tablet: 1 tab(s) orally every 12 hours   Neurontin 300 mg oral capsule: 1 cap(s) orally 3 times a day  Norvasc 10 mg oral tablet: 1 tab(s) orally every 24 hours  pantoprazole 40 mg oral delayed release tablet: 1 tab(s) orally every 12 hours  SITagliptin 50 mg oral tablet: 1 tab(s) orally every 12 hours   Synthroid 50 mcg (0.05 mg) oral tablet: 1 tab(s) orally once a day  tamsulosin 0.4 mg oral capsule: 1 cap(s) orally once a day (at bedtime)  traZODone 100 mg oral tablet: 1 tab(s) orally once a day (at bedtime)

## 2022-01-27 NOTE — DIETITIAN INITIAL EVALUATION ADULT. - OTHER CALCULATIONS
ABW used to calculate estimated since %IBW is between % (119%). Estimated needs based on [facility] standards of care. Adjusted for pressure injury/surgical scar.

## 2022-01-27 NOTE — PROGRESS NOTE ADULT - PROBLEM SELECTOR PLAN 5
k on admission 7.6, EKG with peaked T waves, s/p D5+insulin, lokelma. K now improved to 4.6 after dialysis. Now stable at 4.7 this AM.   - Continue to monitor

## 2022-01-28 LAB
ANION GAP SERPL CALC-SCNC: 8 MMOL/L — SIGNIFICANT CHANGE UP (ref 5–17)
BUN SERPL-MCNC: 14 MG/DL — SIGNIFICANT CHANGE UP (ref 7–23)
CALCIUM SERPL-MCNC: 9.4 MG/DL — SIGNIFICANT CHANGE UP (ref 8.4–10.5)
CHLORIDE SERPL-SCNC: 102 MMOL/L — SIGNIFICANT CHANGE UP (ref 96–108)
CO2 SERPL-SCNC: 23 MMOL/L — SIGNIFICANT CHANGE UP (ref 22–31)
CREAT SERPL-MCNC: 1.2 MG/DL — SIGNIFICANT CHANGE UP (ref 0.5–1.3)
GLUCOSE BLDC GLUCOMTR-MCNC: 158 MG/DL — HIGH (ref 70–99)
GLUCOSE BLDC GLUCOMTR-MCNC: 188 MG/DL — HIGH (ref 70–99)
GLUCOSE BLDC GLUCOMTR-MCNC: 235 MG/DL — HIGH (ref 70–99)
GLUCOSE BLDC GLUCOMTR-MCNC: 372 MG/DL — HIGH (ref 70–99)
GLUCOSE SERPL-MCNC: 153 MG/DL — HIGH (ref 70–99)
HCT VFR BLD CALC: 25.3 % — LOW (ref 39–50)
HGB BLD-MCNC: 8.7 G/DL — LOW (ref 13–17)
MAGNESIUM SERPL-MCNC: 1.7 MG/DL — SIGNIFICANT CHANGE UP (ref 1.6–2.6)
MCHC RBC-ENTMCNC: 30.4 PG — SIGNIFICANT CHANGE UP (ref 27–34)
MCHC RBC-ENTMCNC: 34.4 GM/DL — SIGNIFICANT CHANGE UP (ref 32–36)
MCV RBC AUTO: 88.5 FL — SIGNIFICANT CHANGE UP (ref 80–100)
NRBC # BLD: 0 /100 WBCS — SIGNIFICANT CHANGE UP (ref 0–0)
PHOSPHATE SERPL-MCNC: 2.2 MG/DL — LOW (ref 2.5–4.5)
PLATELET # BLD AUTO: 170 K/UL — SIGNIFICANT CHANGE UP (ref 150–400)
POTASSIUM SERPL-MCNC: 4.4 MMOL/L — SIGNIFICANT CHANGE UP (ref 3.5–5.3)
POTASSIUM SERPL-SCNC: 4.4 MMOL/L — SIGNIFICANT CHANGE UP (ref 3.5–5.3)
RBC # BLD: 2.86 M/UL — LOW (ref 4.2–5.8)
RBC # FLD: 14.6 % — HIGH (ref 10.3–14.5)
SODIUM SERPL-SCNC: 133 MMOL/L — LOW (ref 135–145)
WBC # BLD: 5.11 K/UL — SIGNIFICANT CHANGE UP (ref 3.8–10.5)
WBC # FLD AUTO: 5.11 K/UL — SIGNIFICANT CHANGE UP (ref 3.8–10.5)

## 2022-01-28 PROCEDURE — 99232 SBSQ HOSP IP/OBS MODERATE 35: CPT | Mod: GC

## 2022-01-28 RX ADMIN — Medication 650 MILLIGRAM(S): at 09:36

## 2022-01-28 RX ADMIN — Medication 50 MILLIGRAM(S): at 22:40

## 2022-01-28 RX ADMIN — HEPARIN SODIUM 5000 UNIT(S): 5000 INJECTION INTRAVENOUS; SUBCUTANEOUS at 14:44

## 2022-01-28 RX ADMIN — GABAPENTIN 100 MILLIGRAM(S): 400 CAPSULE ORAL at 22:40

## 2022-01-28 RX ADMIN — Medication 50 MICROGRAM(S): at 06:44

## 2022-01-28 RX ADMIN — PANTOPRAZOLE SODIUM 40 MILLIGRAM(S): 20 TABLET, DELAYED RELEASE ORAL at 06:44

## 2022-01-28 RX ADMIN — GABAPENTIN 100 MILLIGRAM(S): 400 CAPSULE ORAL at 14:43

## 2022-01-28 RX ADMIN — Medication 2 UNIT(S): at 17:37

## 2022-01-28 RX ADMIN — Medication 325 MILLIGRAM(S): at 12:33

## 2022-01-28 RX ADMIN — CHLORHEXIDINE GLUCONATE 1 APPLICATION(S): 213 SOLUTION TOPICAL at 06:44

## 2022-01-28 RX ADMIN — Medication 2: at 08:48

## 2022-01-28 RX ADMIN — Medication 4: at 22:38

## 2022-01-28 RX ADMIN — Medication 2 UNIT(S): at 08:49

## 2022-01-28 RX ADMIN — TAMSULOSIN HYDROCHLORIDE 0.4 MILLIGRAM(S): 0.4 CAPSULE ORAL at 22:40

## 2022-01-28 RX ADMIN — DULOXETINE HYDROCHLORIDE 30 MILLIGRAM(S): 30 CAPSULE, DELAYED RELEASE ORAL at 12:33

## 2022-01-28 RX ADMIN — HEPARIN SODIUM 5000 UNIT(S): 5000 INJECTION INTRAVENOUS; SUBCUTANEOUS at 22:40

## 2022-01-28 RX ADMIN — ATORVASTATIN CALCIUM 10 MILLIGRAM(S): 80 TABLET, FILM COATED ORAL at 22:40

## 2022-01-28 RX ADMIN — Medication 10: at 12:33

## 2022-01-28 RX ADMIN — Medication 50 MILLIGRAM(S): at 14:43

## 2022-01-28 RX ADMIN — GABAPENTIN 100 MILLIGRAM(S): 400 CAPSULE ORAL at 06:44

## 2022-01-28 RX ADMIN — Medication 2 UNIT(S): at 12:34

## 2022-01-28 RX ADMIN — AMLODIPINE BESYLATE 10 MILLIGRAM(S): 2.5 TABLET ORAL at 09:36

## 2022-01-28 RX ADMIN — Medication 2: at 17:36

## 2022-01-28 RX ADMIN — Medication 50 MILLIGRAM(S): at 06:44

## 2022-01-28 RX ADMIN — HEPARIN SODIUM 5000 UNIT(S): 5000 INJECTION INTRAVENOUS; SUBCUTANEOUS at 06:43

## 2022-01-28 RX ADMIN — Medication 650 MILLIGRAM(S): at 20:03

## 2022-01-28 RX ADMIN — Medication 650 MILLIGRAM(S): at 10:35

## 2022-01-28 RX ADMIN — INSULIN GLARGINE 3 UNIT(S): 100 INJECTION, SOLUTION SUBCUTANEOUS at 22:39

## 2022-01-28 NOTE — PROGRESS NOTE ADULT - PROBLEM SELECTOR PLAN 3
On X-ray 1/24, 5 mm hilar pulmonary nodule noted. There is a previous CT Chest from 3/30/2020 that noted a "10 mm nodule demonstrating eccentric calcification within the left upper lobe." Lung nodule as a questionable source of SIADH and patient's frequent presentation with hyponatremia Repeat non-con chest CT performed on 1/26. Stable 9mm nodule. Infectious vs post-inflammatory etiology. Per radiology report "correlation can be made with whole-body PET/CT and/or and/or  histological sampling to exclude malignancy" vs short internal surveillance. Given unchanged nodule, suspicion for malignancy remains low and we recommend short interval surveillance to confirm 2-year stability.   - patient previously did not seek follow-up imaging for this nodule  - recommend f/u imaging in 3-6 months to monitor, pulm appt made

## 2022-01-28 NOTE — PHYSICAL THERAPY INITIAL EVALUATION ADULT - ADDITIONAL COMMENTS
Pt lives at [facility], and performed all ADLs/functional mobility independently prior to admission. Pt uses a rollator for ambulation, however used no other AD/AE.

## 2022-01-28 NOTE — PROGRESS NOTE ADULT - SUBJECTIVE AND OBJECTIVE BOX
Patient is a 62y Male seen and evaluated at bedside. no acute events overnight uop 1.2L / 24 hours; Na 133 electrolytes and volume status at baseline      Meds:    acetaminophen     Tablet .. 650 every 6 hours PRN  amLODIPine   Tablet 10 every 24 hours  atorvastatin 10 at bedtime  chlorhexidine 2% Cloths 1 <User Schedule>  dextrose 40% Gel 15 once  dextrose 5%. 1000 <Continuous>  dextrose 5%. 1000 <Continuous>  dextrose 5%. 1000 <Continuous>  dextrose 50% Injectable 25 once  dextrose 50% Injectable 12.5 once  dextrose 50% Injectable 25 once  DULoxetine 30 daily  ferrous    sulfate 325 daily  gabapentin 100 three times a day  glucagon  Injectable 1 once  heparin   Injectable 5000 every 8 hours  hydrALAZINE 50 every 8 hours  insulin glargine Injectable (LANTUS) 3 at bedtime  insulin lispro (ADMELOG) corrective regimen sliding scale  Before meals and at bedtime  insulin lispro Injectable (ADMELOG) 2 three times a day before meals  levothyroxine 50 daily  pantoprazole    Tablet 40 before breakfast  tamsulosin 0.4 at bedtime  traZODone 50 at bedtime      T(C): , Max: 36.7 (22 @ 16:40)  T(F): , Max: 98 (22 @ 16:40)  HR: 66 (22 @ 05:50)  BP: 130/61 (22 @ 05:50)  BP(mean): --  RR: 16 (22 @ 05:50)  SpO2: 98% (22 @ 05:50)  Wt(kg): --     @ 07:01  -   @ 07:00  --------------------------------------------------------  IN: 0 mL / OUT: 1050 mL / NET: -1050 mL          Review of Systems:  all other ROS negative      PHYSICAL EXAM:  GENERAL: well-developed, well nourished, alert, no acute distress at present  CHEST/LUNG: Clear to auscultation bilaterally no rales, rhonchi or wheezing  HEART: normal S1S2, RRR  ABDOMEN: Soft, Nontender, +BS,   EXTREMITIES: No clubbing, cyanosis, or edema         LABS:                        8.7    5.11  )-----------( 170      ( 2022 07:08 )             25.3         133<L>  |  102  |  14  ----------------------------<  153<H>  4.4   |  23  |  1.20    Ca    9.4      2022 07:08  Phos  2.2       Mg     1.7               Urinalysis Basic - ( 2022 14:34 )    Color: Yellow / Appearance: Clear / S.015 / pH: x  Gluc: x / Ketone: NEGATIVE  / Bili: Negative / Urobili: 0.2 E.U./dL   Blood: x / Protein: 30 mg/dL / Nitrite: NEGATIVE   Leuk Esterase: Large / RBC: < 5 /HPF / WBC Many /HPF   Sq Epi: x / Non Sq Epi: 0-5 /HPF / Bacteria: Many /HPF      Creatinine, Random Urine: 50 mg/dL ( @ 14:34)  Osmolality, Random Urine: 290 mosm/kg ( @ 14:34)  Sodium, Random Urine: 31 mmol/L ( @ 14:34)  Osmolality, Random Urine: 392 mosm/kg ( @ 15:21)  Sodium, Random Urine: 46 mmol/L ( @ 15:21)  Creatinine, Random Urine: 60 mg/dL ( @ 15:21)        RADIOLOGY & ADDITIONAL STUDIES:

## 2022-01-28 NOTE — PROGRESS NOTE ADULT - PROBLEM SELECTOR PLAN 8
Patient with history of HLD, home meds (from discharge 1/19): atorvastatin 10mg  - Continue atorvastatin 10mg qhs

## 2022-01-28 NOTE — PHYSICAL THERAPY INITIAL EVALUATION ADULT - PERTINENT HX OF CURRENT PROBLEM, REHAB EVAL
62M with HTN, DM c/b neuropathy, EtOH liver cirrhosis, toxic megacolon c/b pneumatosis s/p colectomy/ileostomy, and recent admission 1/6-1/19/2022 for weakness found to have acute renal failure requiring hemodialysis, initially presenting to [facility] with generalized weakess.

## 2022-01-28 NOTE — PHYSICAL THERAPY INITIAL EVALUATION ADULT - REFERRING PHYSICIAN, REHAB EVAL
Chest Pain: Care Instructions  Your Care Instructions    There are many things that can cause chest pain. Some are not serious and will get better on their own in a few days. But some kinds of chest pain need more testing and treatment. Your doctor may have recommended a follow-up visit in the next 8 to 12 hours. If you are not getting better, you may need more tests or treatment. Even though your doctor has released you, you still need to watch for any problems. The doctor carefully checked you, but sometimes problems can develop later. If you have new symptoms or if your symptoms do not get better, get medical care right away. If you have worse or different chest pain or pressure that lasts more than 5 minutes or you passed out (lost consciousness), call 911 or seek other emergency help right away. A medical visit is only one step in your treatment. Even if you feel better, you still need to do what your doctor recommends, such as going to all suggested follow-up appointments and taking medicines exactly as directed. This will help you recover and help prevent future problems. How can you care for yourself at home? · Rest until you feel better. · Take your medicine exactly as prescribed. Call your doctor if you think you are having a problem with your medicine. · Do not drive after taking a prescription pain medicine. When should you call for help? Call 911 if:  ? · You passed out (lost consciousness). ? · You have severe difficulty breathing. ? · You have symptoms of a heart attack. These may include:  ¨ Chest pain or pressure, or a strange feeling in your chest.  ¨ Sweating. ¨ Shortness of breath. ¨ Nausea or vomiting. ¨ Pain, pressure, or a strange feeling in your back, neck, jaw, or upper belly or in one or both shoulders or arms. ¨ Lightheadedness or sudden weakness. ¨ A fast or irregular heartbeat.   After you call 911, the  may tell you to chew 1 adult-strength or 2 to 4 low-dose aspirin. Wait for an ambulance. Do not try to drive yourself. ?Call your doctor today if:  ? · You have any trouble breathing. ? · Your chest pain gets worse. ? · You are dizzy or lightheaded, or you feel like you may faint. ? · You are not getting better as expected. ? · You are having new or different chest pain. Where can you learn more? Go to http://dipti-betty.info/. Enter A120 in the search box to learn more about \"Chest Pain: Care Instructions. \"  Current as of: March 20, 2017  Content Version: 11.4  © 3952-0949 [employer]. Care instructions adapted under license by [employer] (which disclaims liability or warranty for this information). If you have questions about a medical condition or this instruction, always ask your healthcare professional. [address] any warranty or liability for your use of this information. Elevated Blood Pressure: Care Instructions  Your Care Instructions    Blood pressure is a measure of how hard the blood pushes against the walls of your arteries. It's normal for blood pressure to go up and down throughout the day. But if it stays up over time, you have high blood pressure. Two numbers tell you your blood pressure. The first number is the systolic pressure. It shows how hard the blood pushes when your heart is pumping. The second number is the diastolic pressure. It shows how hard the blood pushes between heartbeats, when your heart is relaxed and filling with blood. An ideal blood pressure in adults is less than 120/80 (say \"120 over 80\"). High blood pressure is 140/90 or higher. You have high blood pressure if your top number is 140 or higher or your bottom number is 90 or higher, or both. The main test for high blood pressure is simple, fast, and painless. To diagnose high blood pressure, your doctor will test your blood pressure at different times.  After testing your blood pressure, your doctor may ask you to test it again when you are home. If you are diagnosed with high blood pressure, you can work with your doctor to make a long-term plan to manage it. Follow-up care is a key part of your treatment and safety. Be sure to make and go to all appointments, and call your doctor if you are having problems. It's also a good idea to know your test results and keep a list of the medicines you take. How can you care for yourself at home? · Do not smoke. Smoking increases your risk for heart attack and stroke. If you need help quitting, talk to your doctor about stop-smoking programs and medicines. These can increase your chances of quitting for good. · Stay at a healthy weight. · Try to limit how much sodium you eat to less than 2,300 milligrams (mg) a day. Your doctor may ask you to try to eat less than 1,500 mg a day. · Be physically active. Get at least 30 minutes of exercise on most days of the week. Walking is a good choice. You also may want to do other activities, such as running, swimming, cycling, or playing tennis or team sports. · Avoid or limit alcohol. Talk to your doctor about whether you can drink any alcohol. · Eat plenty of fruits, vegetables, and low-fat dairy products. Eat less saturated and total fats. · Learn how to check your blood pressure at home. When should you call for help? Call your doctor now or seek immediate medical care if:  ? · Your blood pressure is much higher than normal (such as 180/110 or higher). ? · You think high blood pressure is causing symptoms such as:  ¨ Severe headache. ¨ Blurry vision. ? Watch closely for changes in your health, and be sure to contact your doctor if:  ? · You do not get better as expected. Where can you learn more? Go to http://dipti-betty.info/. Enter Z848 in the search box to learn more about \"Elevated Blood Pressure: Care Instructions. \"  Current as of: September 21, 2016  Content Version: 11.4  © 1239-2436 [employer]. Care instructions adapted under license by [employer] (which disclaims liability or warranty for this information). If you have questions about a medical condition or this instruction, always ask your healthcare professional. [address] any warranty or liability for your use of this information. Rodger Chahal

## 2022-01-28 NOTE — CHART NOTE - NSCHARTNOTEFT_GEN_A_CORE
Called by RN at 7:45pm, informed vomitus slightly green in color w/ red streaks c/f bilious and/or bloody emesis was found in pt's room. When asked if he had vomited into sink, pt initially denied that he had vomited and instead reported his roommate had vomited into the sink. However, when RN asked pt if he had vomited, pt then admitted to vomiting in sink but reported feeling well, denied nausea, lightheadedness/dizziness, or any other bothersome complaints. Per RN, pt has not had similar episodes of emesis during this admission.     Writer went to evaluate vomitus and pt but was then informed by nurses at nurses' station that pt had disposed of vomitus and was now walking in hallway, still feeling well. Pt seen in hallway, continued to deny nausea or any other complaints.     Plan:   #Emesis, possibly bilious and bloody  - check vitals   - monitor for further episodes of emesis, coordinate w/ staff to evaluate vomitus if another episode   - would check CBC if hematemesis c/f variceal bleed given h/o alcoholic cirrhosis, consider GI consult if significant hematemesis

## 2022-01-28 NOTE — PROGRESS NOTE ADULT - SUBJECTIVE AND OBJECTIVE BOX
INTERVAL HPI/OVERNIGHT EVENTS:  As per night team, no overnight events. Patient seen and examined at bedside. Patient denies fever, chills, dizziness, weakness, HA. No bleeding at HD cath site.       VITALS  Vital Signs Last 24 Hrs  T(C): 36.8 (2022 09:35), Max: 36.8 (2022 09:35)  T(F): 98.2 (2022 09:35), Max: 98.2 (2022 09:35)  HR: 76 (2022 14:41) (66 - 76)  BP: 158/76 (2022 14:41) (130/61 - 170/74)  BP(mean): --  RR: 17 (2022 09:35) (16 - 20)  SpO2: 97% (2022 09:35) (97% - 99%)    CAPILLARY BLOOD GLUCOSE      POCT Blood Glucose.: 372 mg/dL (2022 11:59)  POCT Blood Glucose.: 158 mg/dL (2022 08:35)  POCT Blood Glucose.: 168 mg/dL (2022 21:24)  POCT Blood Glucose.: 314 mg/dL (2022 17:24)      PHYSICAL EXAM  General: NAD, sitting comfortably in bed   HEENT: PERRL/ EOMI, no scleral icterus, MMM  Neck: Supple, no JVD  Respiratory: lungs CTA b/l, no wheezes/crackles, no accessory muscle use  Cardiovascular: Regular rhythm/rate; +S1 +S2, no murmurs  Gastrointestinal: Soft, NTND, normoactive BS, no rebound, no guarding  Genitourinary: no suprapubic tenderness  Extremities: WWP, no cyanosis, no clubbing, no edema, pulses equal  Neurological: A&Ox3, no gross focal deficits, follows commands  Skin: Normal temperature, warm, dry    MEDICATIONS  (STANDING):  amLODIPine   Tablet 10 milliGRAM(s) Oral every 24 hours  atorvastatin 10 milliGRAM(s) Oral at bedtime  chlorhexidine 2% Cloths 1 Application(s) Topical <User Schedule>  dextrose 40% Gel 15 Gram(s) Oral once  dextrose 5%. 1000 milliLiter(s) (50 mL/Hr) IV Continuous <Continuous>  dextrose 5%. 1000 milliLiter(s) (100 mL/Hr) IV Continuous <Continuous>  dextrose 5%. 1000 milliLiter(s) (1000 mL/Hr) IV Continuous <Continuous>  dextrose 50% Injectable 25 Gram(s) IV Push once  dextrose 50% Injectable 12.5 Gram(s) IV Push once  dextrose 50% Injectable 25 Gram(s) IV Push once  DULoxetine 30 milliGRAM(s) Oral daily  ferrous    sulfate 325 milliGRAM(s) Oral daily  gabapentin 100 milliGRAM(s) Oral three times a day  glucagon  Injectable 1 milliGRAM(s) IntraMuscular once  heparin   Injectable 5000 Unit(s) SubCutaneous every 8 hours  hydrALAZINE 50 milliGRAM(s) Oral every 8 hours  insulin glargine Injectable (LANTUS) 3 Unit(s) SubCutaneous at bedtime  insulin lispro (ADMELOG) corrective regimen sliding scale   SubCutaneous Before meals and at bedtime  insulin lispro Injectable (ADMELOG) 2 Unit(s) SubCutaneous three times a day before meals  levothyroxine 50 MICROGram(s) Oral daily  pantoprazole    Tablet 40 milliGRAM(s) Oral before breakfast  tamsulosin 0.4 milliGRAM(s) Oral at bedtime  traZODone 50 milliGRAM(s) Oral at bedtime    MEDICATIONS  (PRN):  acetaminophen     Tablet .. 650 milliGRAM(s) Oral every 6 hours PRN Temp greater or equal to 38C (100.4F), Mild Pain (1 - 3)      No Known Allergies      LABS                        8.7    5.11  )-----------( 170      ( 2022 07:08 )             25.3         133<L>  |  102  |  14  ----------------------------<  153<H>  4.4   |  23  |  1.20    Ca    9.4      2022 07:08  Phos  2.2       Mg     1.7             Urinalysis Basic - ( 2022 14:34 )    Color: Yellow / Appearance: Clear / S.015 / pH: x  Gluc: x / Ketone: NEGATIVE  / Bili: Negative / Urobili: 0.2 E.U./dL   Blood: x / Protein: 30 mg/dL / Nitrite: NEGATIVE   Leuk Esterase: Large / RBC: < 5 /HPF / WBC Many /HPF   Sq Epi: x / Non Sq Epi: 0-5 /HPF / Bacteria: Many /HPF            RADIOLOGY & ADDITIONAL TESTS: Reviewed

## 2022-01-28 NOTE — PROGRESS NOTE ADULT - ASSESSMENT
60yo M hx of ?etoh cirrhosis, HTN, DM2 with neuropathy, hx of toxic megacolon c/b pneumatosis s/p colectomy and ileostomy, admission 1 months ago for hyperkalemia and hyponatremia, presents with generalized weakness malaise and lightheadedness  found to be in ARF ; nephrology consulted for emergent hemodialysis    Assessment/Plan:   #non-oliguric MEG on CKD   #hyperkalemia- resolved  #hyponatremia, euvolemic  #non-anion gap acidosis     likely 2/2 ischemic ATN   recommend:  hold SSRI/HCTZ  strict i's and o's  renal diet  please remove HD catheter   monitor hemodynamics    Yumiko Santos D.O  PGY 4 nephrology Fellow   [phone]

## 2022-01-28 NOTE — PROGRESS NOTE ADULT - PROBLEM SELECTOR PLAN 2
Na on admission 114, within 24 hrs corrected to 135.  Patient A&O x 4 with no focal neurological deficits.  S/p 6 mcg DDAVP and 2L D5W. Renal following patient. Na is 128 -> 130 on AM labs 1/26. Today AM (1/27) Na is 128. Patient's electrolytes have been stable.  - appreciate renal recommendations   - hold SSRI/HCTZ

## 2022-01-29 LAB
ANION GAP SERPL CALC-SCNC: 11 MMOL/L — SIGNIFICANT CHANGE UP (ref 5–17)
BUN SERPL-MCNC: 17 MG/DL — SIGNIFICANT CHANGE UP (ref 7–23)
CALCIUM SERPL-MCNC: 9.2 MG/DL — SIGNIFICANT CHANGE UP (ref 8.4–10.5)
CHLORIDE SERPL-SCNC: 98 MMOL/L — SIGNIFICANT CHANGE UP (ref 96–108)
CO2 SERPL-SCNC: 20 MMOL/L — LOW (ref 22–31)
CREAT ?TM UR-MCNC: 52 MG/DL — SIGNIFICANT CHANGE UP
CREAT SERPL-MCNC: 1.31 MG/DL — HIGH (ref 0.5–1.3)
GLUCOSE BLDC GLUCOMTR-MCNC: 160 MG/DL — HIGH (ref 70–99)
GLUCOSE BLDC GLUCOMTR-MCNC: 191 MG/DL — HIGH (ref 70–99)
GLUCOSE BLDC GLUCOMTR-MCNC: 245 MG/DL — HIGH (ref 70–99)
GLUCOSE BLDC GLUCOMTR-MCNC: 248 MG/DL — HIGH (ref 70–99)
GLUCOSE SERPL-MCNC: 285 MG/DL — HIGH (ref 70–99)
HCT VFR BLD CALC: 25.1 % — LOW (ref 39–50)
HGB BLD-MCNC: 8.1 G/DL — LOW (ref 13–17)
MCHC RBC-ENTMCNC: 29.9 PG — SIGNIFICANT CHANGE UP (ref 27–34)
MCHC RBC-ENTMCNC: 32.3 GM/DL — SIGNIFICANT CHANGE UP (ref 32–36)
MCV RBC AUTO: 92.6 FL — SIGNIFICANT CHANGE UP (ref 80–100)
NRBC # BLD: 0 /100 WBCS — SIGNIFICANT CHANGE UP (ref 0–0)
OSMOLALITY UR: 349 MOSM/KG — SIGNIFICANT CHANGE UP (ref 300–900)
PLATELET # BLD AUTO: 147 K/UL — LOW (ref 150–400)
POTASSIUM SERPL-MCNC: 4.3 MMOL/L — SIGNIFICANT CHANGE UP (ref 3.5–5.3)
POTASSIUM SERPL-SCNC: 4.3 MMOL/L — SIGNIFICANT CHANGE UP (ref 3.5–5.3)
RBC # BLD: 2.71 M/UL — LOW (ref 4.2–5.8)
RBC # FLD: 14.6 % — HIGH (ref 10.3–14.5)
SODIUM SERPL-SCNC: 129 MMOL/L — LOW (ref 135–145)
SODIUM UR-SCNC: 37 MMOL/L — SIGNIFICANT CHANGE UP
WBC # BLD: 5.03 K/UL — SIGNIFICANT CHANGE UP (ref 3.8–10.5)
WBC # FLD AUTO: 5.03 K/UL — SIGNIFICANT CHANGE UP (ref 3.8–10.5)

## 2022-01-29 PROCEDURE — 99232 SBSQ HOSP IP/OBS MODERATE 35: CPT | Mod: GC

## 2022-01-29 RX ADMIN — Medication 2: at 22:16

## 2022-01-29 RX ADMIN — GABAPENTIN 100 MILLIGRAM(S): 400 CAPSULE ORAL at 06:36

## 2022-01-29 RX ADMIN — Medication 650 MILLIGRAM(S): at 23:24

## 2022-01-29 RX ADMIN — Medication 50 MICROGRAM(S): at 06:36

## 2022-01-29 RX ADMIN — Medication 4: at 12:40

## 2022-01-29 RX ADMIN — DULOXETINE HYDROCHLORIDE 30 MILLIGRAM(S): 30 CAPSULE, DELAYED RELEASE ORAL at 11:51

## 2022-01-29 RX ADMIN — Medication 2 UNIT(S): at 17:39

## 2022-01-29 RX ADMIN — HEPARIN SODIUM 5000 UNIT(S): 5000 INJECTION INTRAVENOUS; SUBCUTANEOUS at 22:23

## 2022-01-29 RX ADMIN — Medication 50 MILLIGRAM(S): at 17:15

## 2022-01-29 RX ADMIN — HEPARIN SODIUM 5000 UNIT(S): 5000 INJECTION INTRAVENOUS; SUBCUTANEOUS at 17:16

## 2022-01-29 RX ADMIN — AMLODIPINE BESYLATE 10 MILLIGRAM(S): 2.5 TABLET ORAL at 11:51

## 2022-01-29 RX ADMIN — Medication 50 MILLIGRAM(S): at 22:25

## 2022-01-29 RX ADMIN — GABAPENTIN 100 MILLIGRAM(S): 400 CAPSULE ORAL at 22:24

## 2022-01-29 RX ADMIN — PANTOPRAZOLE SODIUM 40 MILLIGRAM(S): 20 TABLET, DELAYED RELEASE ORAL at 06:37

## 2022-01-29 RX ADMIN — GABAPENTIN 100 MILLIGRAM(S): 400 CAPSULE ORAL at 17:15

## 2022-01-29 RX ADMIN — Medication 2 UNIT(S): at 09:40

## 2022-01-29 RX ADMIN — Medication 2: at 09:40

## 2022-01-29 RX ADMIN — HEPARIN SODIUM 5000 UNIT(S): 5000 INJECTION INTRAVENOUS; SUBCUTANEOUS at 06:37

## 2022-01-29 RX ADMIN — TAMSULOSIN HYDROCHLORIDE 0.4 MILLIGRAM(S): 0.4 CAPSULE ORAL at 22:25

## 2022-01-29 RX ADMIN — Medication 325 MILLIGRAM(S): at 11:52

## 2022-01-29 RX ADMIN — Medication 650 MILLIGRAM(S): at 22:24

## 2022-01-29 RX ADMIN — ATORVASTATIN CALCIUM 10 MILLIGRAM(S): 80 TABLET, FILM COATED ORAL at 22:24

## 2022-01-29 RX ADMIN — INSULIN GLARGINE 3 UNIT(S): 100 INJECTION, SOLUTION SUBCUTANEOUS at 22:23

## 2022-01-29 RX ADMIN — Medication 4: at 17:39

## 2022-01-29 RX ADMIN — Medication 50 MILLIGRAM(S): at 06:36

## 2022-01-29 RX ADMIN — Medication 2 UNIT(S): at 12:40

## 2022-01-29 NOTE — PROGRESS NOTE ADULT - PROBLEM SELECTOR PLAN 5
RESOLVED  k on admission 7.6, EKG with peaked T waves, s/p D5+insulin, lokelma. K now improved to 4.6 after dialysis. Now stable at 4.7 this AM.   - Continue to monitor

## 2022-01-29 NOTE — PROGRESS NOTE ADULT - ASSESSMENT
62yo M hx of ?etoh cirrhosis, HTN, DM2 with neuropathy, hx of toxic megacolon c/b pneumatosis s/p colectomy and ileostomy, admission 1 months ago for hyperkalemia and hyponatremia, presents with generalized weakness malaise and lightheadedness  found to be in ARF ; nephrology consulted for emergent hemodialysis    Assessment/Plan:   #non-oliguric MEG on CKD   #hyperkalemia- resolved  #hyponatremia, euvolemic  #non-anion gap acidosis     likely 2/2 ischemic ATN   recommend:  given bump in Cr and Na down to 129 after episode of vomiting overnight would order repeat urine Na urine osm urine Cr and patient may need IVF bolus  hold SSRI  stop HCTZ- can increase hydralazine if need tighter BP control  strict i's and o's  renal diet   monitor hemodynamics    Yumiko Santos D.O  PGY 4 nephrology Fellow   [phone]

## 2022-01-29 NOTE — PROGRESS NOTE ADULT - SUBJECTIVE AND OBJECTIVE BOX
Patient is a 62y Male seen and evaluated at bedside. patient had episode of vomiting overngit; states he feels better this AM CR bump up to 1.3 NA down to 129; uop 1.7L/24 hours patient remains HDS      Meds:    acetaminophen     Tablet .. 650 every 6 hours PRN  amLODIPine   Tablet 10 every 24 hours  atorvastatin 10 at bedtime  chlorhexidine 2% Cloths 1 <User Schedule>  dextrose 40% Gel 15 once  dextrose 5%. 1000 <Continuous>  dextrose 5%. 1000 <Continuous>  dextrose 5%. 1000 <Continuous>  dextrose 50% Injectable 25 once  dextrose 50% Injectable 12.5 once  dextrose 50% Injectable 25 once  DULoxetine 30 daily  ferrous    sulfate 325 daily  gabapentin 100 three times a day  glucagon  Injectable 1 once  heparin   Injectable 5000 every 8 hours  hydrALAZINE 50 every 8 hours  insulin glargine Injectable (LANTUS) 3 at bedtime  insulin lispro (ADMELOG) corrective regimen sliding scale  Before meals and at bedtime  insulin lispro Injectable (ADMELOG) 2 three times a day before meals  levothyroxine 50 daily  pantoprazole    Tablet 40 before breakfast  tamsulosin 0.4 at bedtime  traZODone 50 at bedtime      T(C): , Max: 37.1 (22 @ 19:56)  T(F): , Max: 98.7 (22 @ 19:56)  HR: 84 (22 @ 11:43)  BP: 131/74 (22 @ 11:43)  BP(mean): --  RR: 18 (22 @ 11:43)  SpO2: 99% (22 @ 11:43)  Wt(kg): --     @ 07:  -   @ 07:00  --------------------------------------------------------  IN: 0 mL / OUT: 1745 mL / NET: -1745 mL          Review of Systems:  all other ROS negative      PHYSICAL EXAM:  GENERAL: well-developed, well nourished, alert, no acute distress at present  CHEST/LUNG: Clear to auscultation bilaterally no rales, rhonchi or wheezing   HEART: normal S1S2, RRR  ABDOMEN: Soft, Nontender, +BS, No flank tenderness bilateral  EXTREMITIES: No clubbing, cyanosis, or edema         LABS:                        8.1    5.03  )-----------( 147      ( 2022 10:53 )             25.1         129<L>  |  98  |  17  ----------------------------<  285<H>  4.3   |  20<L>  |  1.31<H>    Ca    9.2      2022 10:53  Phos  2.2       Mg     1.7               Urinalysis Basic - ( 2022 14:34 )    Color: Yellow / Appearance: Clear / S.015 / pH: x  Gluc: x / Ketone: NEGATIVE  / Bili: Negative / Urobili: 0.2 E.U./dL   Blood: x / Protein: 30 mg/dL / Nitrite: NEGATIVE   Leuk Esterase: Large / RBC: < 5 /HPF / WBC Many /HPF   Sq Epi: x / Non Sq Epi: 0-5 /HPF / Bacteria: Many /HPF      Creatinine, Random Urine: 50 mg/dL ( @ 14:34)  Osmolality, Random Urine: 290 mosm/kg ( @ 14:34)  Sodium, Random Urine: 31 mmol/L ( @ 14:34)        RADIOLOGY & ADDITIONAL STUDIES:

## 2022-01-29 NOTE — PROGRESS NOTE ADULT - PROBLEM SELECTOR PLAN 2
Na on admission 114, within 24 hrs corrected to 135.  Patient A&O x 4 with no focal neurological deficits.  S/p 6 mcg DDAVP and 2L D5W. Renal following patient. Na is 128 -> 130 on AM labs 1/26. Today AM (1/27) Na is 128. Patient's electrolytes have been stable.  - appreciate renal recommendations   - hold SSRI/HCTZ --> pt will be d/vitaly without HCTZ can uptitrate Hydralazine if needed

## 2022-01-29 NOTE — PROGRESS NOTE ADULT - SUBJECTIVE AND OBJECTIVE BOX
INTERVAL HPI/OVERNIGHT EVENTS:  As per night team, no overnight events. Patient seen and examined at bedside. Patient denies fever, chills, dizziness, weakness, HA. No bleeding at HD cath site.       Vital Signs Last 24 Hrs  T(C): 36.7 (2022 11:43), Max: 37.1 (2022 19:56)  T(F): 98 (2022 11:43), Max: 98.7 (2022 19:56)  HR: 84 (2022 11:43) (71 - 84)  BP: 131/74 (2022 11:43) (131/74 - 158/76)  BP(mean): --  RR: 18 (2022 11:43) (18 - 18)  SpO2: 99% (2022 11:43) (96% - 99%)      PHYSICAL EXAM  General: NAD, sitting comfortably in bed   HEENT: PERRL/ EOMI, no scleral icterus, MMM  Neck: Supple, no JVD  Respiratory: lungs CTA b/l, no wheezes/crackles, no accessory muscle use  Cardiovascular: Regular rhythm/rate; +S1 +S2, no murmurs  Gastrointestinal: Soft, NTND, normoactive BS, no rebound, no guarding  Genitourinary: no suprapubic tenderness  Extremities: WWP, no cyanosis, no clubbing, no edema, pulses equal  Neurological: A&Ox3, no gross focal deficits, follows commands  Skin: Normal temperature, warm, dry        MEDICATIONS  (STANDING):  amLODIPine   Tablet 10 milliGRAM(s) Oral every 24 hours  atorvastatin 10 milliGRAM(s) Oral at bedtime  chlorhexidine 2% Cloths 1 Application(s) Topical <User Schedule>  dextrose 40% Gel 15 Gram(s) Oral once  dextrose 5%. 1000 milliLiter(s) (50 mL/Hr) IV Continuous <Continuous>  dextrose 5%. 1000 milliLiter(s) (100 mL/Hr) IV Continuous <Continuous>  dextrose 5%. 1000 milliLiter(s) (1000 mL/Hr) IV Continuous <Continuous>  dextrose 50% Injectable 25 Gram(s) IV Push once  dextrose 50% Injectable 12.5 Gram(s) IV Push once  dextrose 50% Injectable 25 Gram(s) IV Push once  DULoxetine 30 milliGRAM(s) Oral daily  ferrous    sulfate 325 milliGRAM(s) Oral daily  gabapentin 100 milliGRAM(s) Oral three times a day  glucagon  Injectable 1 milliGRAM(s) IntraMuscular once  heparin   Injectable 5000 Unit(s) SubCutaneous every 8 hours  hydrALAZINE 50 milliGRAM(s) Oral every 8 hours  insulin glargine Injectable (LANTUS) 3 Unit(s) SubCutaneous at bedtime  insulin lispro (ADMELOG) corrective regimen sliding scale   SubCutaneous Before meals and at bedtime  insulin lispro Injectable (ADMELOG) 2 Unit(s) SubCutaneous three times a day before meals  levothyroxine 50 MICROGram(s) Oral daily  pantoprazole    Tablet 40 milliGRAM(s) Oral before breakfast  tamsulosin 0.4 milliGRAM(s) Oral at bedtime  traZODone 50 milliGRAM(s) Oral at bedtime    MEDICATIONS  (PRN):  acetaminophen     Tablet .. 650 milliGRAM(s) Oral every 6 hours PRN Temp greater or equal to 38C (100.4F), Mild Pain (1 - 3)        No Known Allergies      LABS                          8.1    5.03  )-----------( 147      ( 2022 10:53 )             25.1   01-    129<L>  |  98  |  17  ----------------------------<  285<H>  4.3   |  20<L>  |  1.31<H>    Ca    9.2      2022 10:53  Phos  2.2     -  Mg     1.7     -            Urinalysis Basic - ( 2022 14:34 )    Color: Yellow / Appearance: Clear / S.015 / pH: x  Gluc: x / Ketone: NEGATIVE  / Bili: Negative / Urobili: 0.2 E.U./dL   Blood: x / Protein: 30 mg/dL / Nitrite: NEGATIVE   Leuk Esterase: Large / RBC: < 5 /HPF / WBC Many /HPF   Sq Epi: x / Non Sq Epi: 0-5 /HPF / Bacteria: Many /HPF            RADIOLOGY & ADDITIONAL TESTS: Reviewed

## 2022-01-29 NOTE — PROGRESS NOTE ADULT - PROBLEM SELECTOR PLAN 7
Patient with history of hypertension, home meds (from discharge 1/19): hydrALAZINE 50mg q8hrs, norvasc 10mg qd. Continue above meds except HCTZ

## 2022-01-29 NOTE — PROGRESS NOTE ADULT - SUBJECTIVE AND OBJECTIVE BOX
Patient is a 62y Male seen and evaluated at bedside. patient had an episode of vomiting overnight; feels better this AM Na down to 129 and Cr bump to 1.3 uop 1.7l/24 hours; remains HDS      Meds:    acetaminophen     Tablet .. 650 every 6 hours PRN  amLODIPine   Tablet 10 every 24 hours  atorvastatin 10 at bedtime  chlorhexidine 2% Cloths 1 <User Schedule>  dextrose 40% Gel 15 once  dextrose 5%. 1000 <Continuous>  dextrose 5%. 1000 <Continuous>  dextrose 5%. 1000 <Continuous>  dextrose 50% Injectable 25 once  dextrose 50% Injectable 12.5 once  dextrose 50% Injectable 25 once  DULoxetine 30 daily  ferrous    sulfate 325 daily  gabapentin 100 three times a day  glucagon  Injectable 1 once  heparin   Injectable 5000 every 8 hours  hydrALAZINE 50 every 8 hours  insulin glargine Injectable (LANTUS) 3 at bedtime  insulin lispro (ADMELOG) corrective regimen sliding scale  Before meals and at bedtime  insulin lispro Injectable (ADMELOG) 2 three times a day before meals  levothyroxine 50 daily  pantoprazole    Tablet 40 before breakfast  tamsulosin 0.4 at bedtime  traZODone 50 at bedtime      T(C): , Max: 37.1 (22 @ 19:56)  T(F): , Max: 98.7 (22 @ 19:56)  HR: 84 (22 @ 11:43)  BP: 131/74 (22 @ 11:43)  BP(mean): --  RR: 18 (22 @ 11:43)  SpO2: 99% (22 @ 11:43)  Wt(kg): --     @ 07:  -   @ 07:00  --------------------------------------------------------  IN: 0 mL / OUT: 1745 mL / NET: -1745 mL          Review of Systems:  all other ROS negative      PHYSICAL EXAM:  GENERAL: well-developed, well nourished, alert, no acute distress at present  CHEST/LUNG: Clear to auscultation bilaterally no rales, rhonchi or wheezing  HEART: normal S1S2, RRR  ABDOMEN: Soft, Nontender, +BS, No flank tenderness bilateral  EXTREMITIES: No clubbing, cyanosis, or edema         LABS:                        8.1    5.03  )-----------( 147      ( 2022 10:53 )             25.1         129<L>  |  98  |  17  ----------------------------<  285<H>  4.3   |  20<L>  |  1.31<H>    Ca    9.2      2022 10:53  Phos  2.2       Mg     1.7               Urinalysis Basic - ( 2022 14:34 )    Color: Yellow / Appearance: Clear / S.015 / pH: x  Gluc: x / Ketone: NEGATIVE  / Bili: Negative / Urobili: 0.2 E.U./dL   Blood: x / Protein: 30 mg/dL / Nitrite: NEGATIVE   Leuk Esterase: Large / RBC: < 5 /HPF / WBC Many /HPF   Sq Epi: x / Non Sq Epi: 0-5 /HPF / Bacteria: Many /HPF      Creatinine, Random Urine: 50 mg/dL ( @ 14:34)  Osmolality, Random Urine: 290 mosm/kg ( @ 14:34)  Sodium, Random Urine: 31 mmol/L ( @ 14:34)        RADIOLOGY & ADDITIONAL STUDIES:

## 2022-01-30 ENCOUNTER — TRANSCRIPTION ENCOUNTER (OUTPATIENT)
Age: 62
End: 2022-01-30

## 2022-01-30 LAB
ANION GAP SERPL CALC-SCNC: 12 MMOL/L — SIGNIFICANT CHANGE UP (ref 5–17)
BUN SERPL-MCNC: 17 MG/DL — SIGNIFICANT CHANGE UP (ref 7–23)
CALCIUM SERPL-MCNC: 10 MG/DL — SIGNIFICANT CHANGE UP (ref 8.4–10.5)
CHLORIDE SERPL-SCNC: 100 MMOL/L — SIGNIFICANT CHANGE UP (ref 96–108)
CO2 SERPL-SCNC: 21 MMOL/L — LOW (ref 22–31)
CREAT SERPL-MCNC: 1.29 MG/DL — SIGNIFICANT CHANGE UP (ref 0.5–1.3)
GLUCOSE BLDC GLUCOMTR-MCNC: 140 MG/DL — HIGH (ref 70–99)
GLUCOSE BLDC GLUCOMTR-MCNC: 162 MG/DL — HIGH (ref 70–99)
GLUCOSE BLDC GLUCOMTR-MCNC: 277 MG/DL — HIGH (ref 70–99)
GLUCOSE BLDC GLUCOMTR-MCNC: 318 MG/DL — HIGH (ref 70–99)
GLUCOSE SERPL-MCNC: 178 MG/DL — HIGH (ref 70–99)
MAGNESIUM SERPL-MCNC: 1.8 MG/DL — SIGNIFICANT CHANGE UP (ref 1.6–2.6)
POTASSIUM SERPL-MCNC: 4.6 MMOL/L — SIGNIFICANT CHANGE UP (ref 3.5–5.3)
POTASSIUM SERPL-SCNC: 4.6 MMOL/L — SIGNIFICANT CHANGE UP (ref 3.5–5.3)
SODIUM SERPL-SCNC: 133 MMOL/L — LOW (ref 135–145)

## 2022-01-30 PROCEDURE — 99232 SBSQ HOSP IP/OBS MODERATE 35: CPT | Mod: GC

## 2022-01-30 RX ORDER — GABAPENTIN 400 MG/1
300 CAPSULE ORAL EVERY 8 HOURS
Refills: 0 | Status: DISCONTINUED | OUTPATIENT
Start: 2022-01-30 | End: 2022-01-31

## 2022-01-30 RX ORDER — GABAPENTIN 400 MG/1
200 CAPSULE ORAL ONCE
Refills: 0 | Status: COMPLETED | OUTPATIENT
Start: 2022-01-30 | End: 2022-01-30

## 2022-01-30 RX ADMIN — ATORVASTATIN CALCIUM 10 MILLIGRAM(S): 80 TABLET, FILM COATED ORAL at 23:01

## 2022-01-30 RX ADMIN — Medication 50 MILLIGRAM(S): at 23:01

## 2022-01-30 RX ADMIN — Medication 50 MILLIGRAM(S): at 23:02

## 2022-01-30 RX ADMIN — GABAPENTIN 200 MILLIGRAM(S): 400 CAPSULE ORAL at 13:20

## 2022-01-30 RX ADMIN — PANTOPRAZOLE SODIUM 40 MILLIGRAM(S): 20 TABLET, DELAYED RELEASE ORAL at 07:12

## 2022-01-30 RX ADMIN — HEPARIN SODIUM 5000 UNIT(S): 5000 INJECTION INTRAVENOUS; SUBCUTANEOUS at 15:17

## 2022-01-30 RX ADMIN — INSULIN GLARGINE 3 UNIT(S): 100 INJECTION, SOLUTION SUBCUTANEOUS at 23:01

## 2022-01-30 RX ADMIN — HEPARIN SODIUM 5000 UNIT(S): 5000 INJECTION INTRAVENOUS; SUBCUTANEOUS at 08:46

## 2022-01-30 RX ADMIN — Medication 650 MILLIGRAM(S): at 23:18

## 2022-01-30 RX ADMIN — GABAPENTIN 100 MILLIGRAM(S): 400 CAPSULE ORAL at 08:47

## 2022-01-30 RX ADMIN — Medication 325 MILLIGRAM(S): at 11:34

## 2022-01-30 RX ADMIN — GABAPENTIN 300 MILLIGRAM(S): 400 CAPSULE ORAL at 23:01

## 2022-01-30 RX ADMIN — HEPARIN SODIUM 5000 UNIT(S): 5000 INJECTION INTRAVENOUS; SUBCUTANEOUS at 23:02

## 2022-01-30 RX ADMIN — CHLORHEXIDINE GLUCONATE 1 APPLICATION(S): 213 SOLUTION TOPICAL at 07:00

## 2022-01-30 RX ADMIN — Medication 2 UNIT(S): at 10:31

## 2022-01-30 RX ADMIN — Medication 50 MILLIGRAM(S): at 08:47

## 2022-01-30 RX ADMIN — DULOXETINE HYDROCHLORIDE 30 MILLIGRAM(S): 30 CAPSULE, DELAYED RELEASE ORAL at 11:34

## 2022-01-30 RX ADMIN — Medication 50 MICROGRAM(S): at 07:11

## 2022-01-30 RX ADMIN — Medication 650 MILLIGRAM(S): at 10:00

## 2022-01-30 RX ADMIN — TAMSULOSIN HYDROCHLORIDE 0.4 MILLIGRAM(S): 0.4 CAPSULE ORAL at 23:01

## 2022-01-30 RX ADMIN — AMLODIPINE BESYLATE 10 MILLIGRAM(S): 2.5 TABLET ORAL at 10:00

## 2022-01-30 RX ADMIN — Medication 8: at 10:30

## 2022-01-30 RX ADMIN — Medication 2 UNIT(S): at 18:22

## 2022-01-30 RX ADMIN — Medication 2 UNIT(S): at 13:10

## 2022-01-30 RX ADMIN — Medication 2: at 22:59

## 2022-01-30 RX ADMIN — Medication 50 MILLIGRAM(S): at 15:16

## 2022-01-30 RX ADMIN — Medication 6: at 18:21

## 2022-01-30 NOTE — PROGRESS NOTE ADULT - PROBLEM SELECTOR PLAN 10
Patient with history of toxic megacolon c/b pneumatosis s/p colectomy/ileostomy. Ostomy output draining brown stool.  - No acute interventions, continuing to monitor Sam Anderson Kunjuraman

## 2022-01-30 NOTE — PROGRESS NOTE ADULT - PROVIDER SPECIALTY LIST ADULT
Internal Medicine
Internal Medicine
Nephrology
Internal Medicine
MICU
Nephrology
Internal Medicine
Internal Medicine
Nephrology
Hospitalist
Internal Medicine

## 2022-01-30 NOTE — PROGRESS NOTE ADULT - PROBLEM SELECTOR PROBLEM 1
Acute renal failure (ARF)

## 2022-01-30 NOTE — DISCHARGE NOTE NURSING/CASE MANAGEMENT/SOCIAL WORK - PATIENT PORTAL LINK FT
You can access the FollowMyHealth Patient Portal offered by [facility] by registering at the following website: [email or web address]. By joining [facility]’s FollowMyHealth portal, you will also be able to view your health information using other applications (apps) compatible with our system.

## 2022-01-30 NOTE — DISCHARGE NOTE NURSING/CASE MANAGEMENT/SOCIAL WORK - NSDCPEFALRISK_GEN_ALL_CORE
For information on Fall & Injury Prevention, visit: [email or web address] OR  [email or web address] OR  https://www.cdc.gov/steadi/patient.html

## 2022-01-30 NOTE — PROGRESS NOTE ADULT - ATTENDING COMMENTS
work up for NITHYA lung nodule needed  electrolytes are improved, appreciate nephro recs/follow up
Improvement in labs noted.  Case d/w Dr. Santos on rounds.  Agree with details of note above.
Interim chart reviewed and case d/w fellow during renal rounds.  Labs noted. Agree with details of Dr. Santos's note above.
Please check urine studies today as pt's creatinine slightly increased and sodium decreased.  Of note, pt was vomiting last pm.   Please contact renal fellow with results to discuss possible NS IVF.
Chart reviewed and case discussed with renal fellow.  Agree with details of Dr. Santos's note above. Agree with above recommendations.  Please monitor serum Na level closely and contact on-call renal fellow for recommendations.
Pt seen at bedside with fellow during renal rounds.  Chart reviewed; labs noted.  No further HCTZ in the future.  Trend labs and f/u recommendations re hypoNa.
History of toxic megacolon s/p colectomy with colostomy bag with acute hyperkalemia with acute renal failure. HD was done. Good urine output, hyperkalemia resolved. Vitals stable. He may transfer to floor.
Hyponatremia - SIADH - resolved , continue fluid restriction   Acute Renal Failure due to ATN  - last HD > 3 days ago , HD catheter removed , patient urinating and BUN , S Cr , Potassium and HCO3 stable    Hx of Toxic megacolon s/p Subtotal Colectomy   HLD  HTN  Hypothyroidism   Non Insulin dependent Diabetes mellitus
Patient was seen and examined at bedside on 1/30/2022 at 10 am  -Agree with history and physical as above  Pt has no acute complaints, awaiting  -Pending shelter placement  -Will hold HCTZ on d/c
62M with HTN, DM c/b neuropathy, EtOH liver cirrhosis, toxic megacolon c/b pneumatosis s/p colectomy/ileostomy, and multiple admissions for ARF w/ hyperkalemia (most recently 1/6-1/19/2022, requiring HD), initially presenting to [facility] for generalized weakness since discharge, found to have acute renal failure with hyperkalemia and EKG changes and admitted to the ICU for emergent dialysis.    # MEG on CKD.   # Hyponatremia.   # Hyperkalemia.   # Alcohol abuse with liver cirrhosis.   # HTN.   # DM.   # Hypothyroidism    - Unclear cause of MEG leading to dialysis, continue to follow up with nephrology.   - Trend electrolytes and treat as needed.   - Trend Na level, do not correct faster than 6-8 meq/24 h.   - Rest of care as per the resident's note.

## 2022-01-30 NOTE — PROGRESS NOTE ADULT - SUBJECTIVE AND OBJECTIVE BOX
INTERVAL HPI/OVERNIGHT EVENTS:  As per night team, no overnight events. Patient seen and examined at bedside. Patient denies fever, chills, dizziness, weakness, HA, CP, SOB, Nausea, diarrhea. No further episodes of vomiting.     VITALS  Vital Signs Last 24 Hrs  T(C): 36.7 (30 Jan 2022 08:59), Max: 36.9 (29 Jan 2022 21:20)  T(F): 98 (30 Jan 2022 08:59), Max: 98.5 (29 Jan 2022 21:20)  HR: 67 (30 Jan 2022 08:59) (56 - 84)  BP: 153/66 (30 Jan 2022 08:59) (130/75 - 163/68)  BP(mean): 100 (29 Jan 2022 16:49) (100 - 100)  RR: 20 (30 Jan 2022 08:59) (18 - 20)  SpO2: 96% (30 Jan 2022 08:59) (96% - 100%)    CAPILLARY BLOOD GLUCOSE      POCT Blood Glucose.: 191 mg/dL (29 Jan 2022 21:13)  POCT Blood Glucose.: 245 mg/dL (29 Jan 2022 17:21)  POCT Blood Glucose.: 248 mg/dL (29 Jan 2022 12:06)      PHYSICAL EXAM  General: NAD, sitting comfortably in bed   HEENT: PERRL/ EOMI, no scleral icterus, MMM  Neck: Supple, no JVD  Respiratory: lungs CTA b/l, no wheezes/crackles, no accessory muscle use  Cardiovascular: Regular rhythm/rate; +S1 +S2, no murmurs  Gastrointestinal: Soft, NTND, normoactive BS, no rebound, no guarding  Genitourinary: no suprapubic tenderness  Extremities: WWP, no cyanosis, no clubbing, no edema, pulses equal  Neurological: A&Ox3, no gross focal deficits, follows commands  Skin: Normal temperature, warm, dry  Psych: A&Ox3, no auditory or visual hallucinations, denies suicidal ideation, denies depressed mood, denies homicidal ideation      MEDICATIONS  (STANDING):  amLODIPine   Tablet 10 milliGRAM(s) Oral every 24 hours  atorvastatin 10 milliGRAM(s) Oral at bedtime  chlorhexidine 2% Cloths 1 Application(s) Topical <User Schedule>  dextrose 40% Gel 15 Gram(s) Oral once  dextrose 5%. 1000 milliLiter(s) (50 mL/Hr) IV Continuous <Continuous>  dextrose 5%. 1000 milliLiter(s) (100 mL/Hr) IV Continuous <Continuous>  dextrose 5%. 1000 milliLiter(s) (1000 mL/Hr) IV Continuous <Continuous>  dextrose 50% Injectable 25 Gram(s) IV Push once  dextrose 50% Injectable 12.5 Gram(s) IV Push once  dextrose 50% Injectable 25 Gram(s) IV Push once  DULoxetine 30 milliGRAM(s) Oral daily  ferrous    sulfate 325 milliGRAM(s) Oral daily  gabapentin 100 milliGRAM(s) Oral three times a day  glucagon  Injectable 1 milliGRAM(s) IntraMuscular once  heparin   Injectable 5000 Unit(s) SubCutaneous every 8 hours  hydrALAZINE 50 milliGRAM(s) Oral every 8 hours  insulin glargine Injectable (LANTUS) 3 Unit(s) SubCutaneous at bedtime  insulin lispro (ADMELOG) corrective regimen sliding scale   SubCutaneous Before meals and at bedtime  insulin lispro Injectable (ADMELOG) 2 Unit(s) SubCutaneous three times a day before meals  levothyroxine 50 MICROGram(s) Oral daily  pantoprazole    Tablet 40 milliGRAM(s) Oral before breakfast  tamsulosin 0.4 milliGRAM(s) Oral at bedtime  traZODone 50 milliGRAM(s) Oral at bedtime    MEDICATIONS  (PRN):  acetaminophen     Tablet .. 650 milliGRAM(s) Oral every 6 hours PRN Temp greater or equal to 38C (100.4F), Mild Pain (1 - 3)      No Known Allergies      LABS                        8.1    5.03  )-----------( 147      ( 29 Jan 2022 10:53 )             25.1     01-30    133<L>  |  100  |  17  ----------------------------<  178<H>  4.6   |  21<L>  |  1.29    Ca    10.0      30 Jan 2022 07:58  Mg     1.8     01-30                RADIOLOGY & ADDITIONAL TESTS: Reviewed

## 2022-01-30 NOTE — DISCHARGE NOTE NURSING/CASE MANAGEMENT/SOCIAL WORK - NSDCFUADDAPPT_GEN_ALL_CORE_FT
1. You have a follow-up appointment with your primary care physician Dr. Ovidio Pham on February 9th at 9:20 AM. Please bring your hospital discharge paperwork to your appointment.     2. You have a follow-up appointment with a pulmonologist Dr. Song on February 18th at 12:30 PM. Please bring your ID card and insurance card to your appointment. Please call the office if you are experiencing any COVID-19 symptoms or had exposure to anyone sick with COVID-19 before your appointment. The office is located on [address] [place] [address].    2. You have a follow-up appointment with a nephrologist Dr. Ryan Mulligan on February 14th at 1:20 PM. Please bring your ID card and insurance card to your appointment. Please call the office if you are experiencing any COVID-19 symptoms or had exposure to anyone sick with COVID-19 before your appointment. The office is located on [address] [place] [address].

## 2022-01-30 NOTE — PROGRESS NOTE ADULT - PROBLEM SELECTOR PLAN 1
Patient with ARF on admission, K 7.6, HCO3 8, BUN 87, Cr 4.39, etiology likely  pre-renal with FeNa 0.7%, but given 3rd admission for ARF, further workup is warranted. R IJ HD cath placed and pt is s/p 1st episode of HD. No evidence of fluid overload. Renal is following. 1/25: Deloris 28, UCr 32, Uosm 177. 1/26: Deloris:= 46 UCr 60 UOsm 392. Renal US negative.  Patient's electrolytes have been stable in past 24 hours, with downtrending Cr.   - Avoid nephrotoxic medications and renally dose medications (gabapentin)  - Appreciate renal recommendations  - f/u if okay to remove HD catheter  - Monitor renal function with BMP  - f/u renal sono  - strict I/O  - renal diet  - monitor hemodynamics
Patient with ARF on admission, K 7.6, HCO3 8, BUN 87, Cr 4.39, etiology likely  pre-renal with FeNa 0.7%, but given 3rd admission for ARF, further workup is warranted. R IJ HD cath placed and pt is s/p 1st episode of HD. No evidence of fluid overload. Renal is following. 1/25: Deloris 28, UCr 32, Uosm 177.  - Avoid nephrotoxic medications and renally dose medications (gabapentin)  - Appreciate renal recommendations  - Per renal, likely no need for HD today, will reassess need tomorrow and may remove HD catheter if long necessary  - Monitor renal function with BMP  - f/u renal sono  - f/u repeat Deloris, UCr, UOsm, and BMP  - strict I/O  - renal diet  - monitor hemodynamics
Patient with ARF on admission, K 7.6, HCO3 8, BUN 87, Cr 4.39, etiology likely  pre-renal with FeNa 0.7%, but given 3rd admission for ARF, further workup is warranted. R IJ HD cath placed and pt is s/p 1st episode of HD. No evidence of fluid overload. Renal is following. 1/25: Deloris 28, UCr 32, Uosm 177. 1/26: Deloris:= 46 UCr 60 UOsm 392. Renal US negative.  Patient's electrolytes have been stable in past 24 hours, with downtrending Cr.   - renal following, HD cath d/c   - renal sono w/o hydro or stones  - strict I/O  - renal diet  - tolerating 100mg TID gabapentin, Cr stable  - monitor hemodynamics
Patient with ARF on admission, K 7.6, HCO3 8, BUN 87, Cr 4.39, etiology likely  pre-renal with FeNa 0.7%, but given 3rd admission for ARF, further workup is warranted. R IJ HD cath placed and pt is s/p 1st episode of HD. No evidence of fluid overload. Renal is following. 1/25: Deloris 28, UCr 32, Uosm 177. 1/26: Deloris:= 46 UCr 60 UOsm 392. Renal US negative.  Patient's electrolytes have been stable in past 24 hours, with downtrending Cr.   - renal following, HD cath d/c   - renal sono w/o hydro or stones  - strict I/O  - renal diet  - tolerating 100mg TID gabapentin, Cr stable  - monitor hemodynamics    -Will d/w renal what possible etiologies are as pt recurrent readmission
Patient with ARF on admission, K 7.6, HCO3 8, BUN 87, Cr 4.39, etiology likely  pre-renal with FeNa 0.7%, but given 3rd admission for ARF, further workup is warranted. R IJ HD cath placed and pt is s/p 1st episode of HD. No evidence of fluid overload. Renal is following. 1/25: Deloris 28, UCr 32, Uosm 177. 1/26: Deloris:= 46 UCr 60 UOsm 392. Renal US negative.  Patient's electrolytes have been stable in past 24 hours, with downtrending Cr.   - renal sono w/o hydro or stones  - strict I/O, renal diet  - tolerating 100mg TID gabapentin, Cr stable  - renal following, recs appreciated  - monitor hemodynamics
Patient with ARF on admission, K 7.6, HCO3 8, BUN 87, Cr 4.39, etiology likely  pre-renal with FeNa 0.7%, but given this is a recurrent problem further workerup likely warranted. Utox clear. No evidence of fluid overload on exam.  Renal consulted upon arrival to ICU. R IJ HD cath placed at bedside and 1st episode of HD overnight.    - Avoid nephrotoxic medications and renally dose medications  - Appreciate renal recommendations  - Monitor renal function with BMP  - f/u renal sono  - strict I/O  - renal diet

## 2022-01-30 NOTE — PROGRESS NOTE ADULT - REASON FOR ADMISSION
acute renal failure

## 2022-01-30 NOTE — PROGRESS NOTE ADULT - PROBLEM SELECTOR PLAN 4
Patient has a limited diet in shelter, avoids shelter food, eating only breakfast and dinner (cereal, rice, and laurita). Chart review from most recent admission revealed note from dietician/nutritional services with concern for weight loss and moderate protein-calorie malnutrition  -consult nutrition and appreciate recommendations  -consult SW if any outpatient solutions are available
On X-ray 1/24, 5 mm hilar pulmonary nodule noted. There is a previous CT Chest from 3/30/2020 that noted a "10 mm nodule demonstrating eccentric calcification within the left upper  lobe. This may represent the sequela of remote granulomatous disease however in view of the eccentric location of the calcification other etiologies such   as carcinoma cannot be excluded.  Paraseptal and with a remote thoracic CT would be of value which is not presently available. If no prior imaging is available, further evaluation to include Whole-body PET/CT, histological   sampling and/or short interval surveillance in 3 months is suggested to confirm stability." Lung nodule as a questionable source of SIADH and patient's persistent hyponatremia  - patient did not seek follow-up imaging for this nodule  - radiology recommended non-contrast CT to compare
Patient has a limited diet in shelter, avoids shelter food, eating only breakfast and dinner (cereal, rice, and laurita). Chart review from most recent admission revealed note from dietician/nutritional services with concern for weight loss and moderate protein-calorie malnutrition  -consult nutrition and appreciate recommendations  -consult SW if any outpatient solutions are available
Patient reports symptoms of BPH including frequent urination along with sense of incomplete bladder emptying. On last admission, patient was discharged (1/19) with tamsulosin 0.4 mg qhs, since BPH might contribute to symptoms.  - Continue tamsulosin 0.4mg qhs
Patient has a limited diet in shelter, avoids shelter food, eating only breakfast and dinner (cereal, rice, and laurita). Chart review from most recent admission revealed note from dietician/nutritional services with concern for weight loss and moderate protein-calorie malnutrition  -consult nutrition and appreciate recommendations  -consult SW if any outpatient solutions are available
Patient has a limited diet in shelter, avoids shelter food, eating only breakfast and dinner (cereal, rice, and laurita). Chart review from most recent admission revealed note from dietician/nutritional services with concern for weight loss and moderate protein-calorie malnutrition  - nutrition following, recs appreciated

## 2022-01-31 VITALS
TEMPERATURE: 98 F | HEART RATE: 75 BPM | SYSTOLIC BLOOD PRESSURE: 159 MMHG | DIASTOLIC BLOOD PRESSURE: 67 MMHG | RESPIRATION RATE: 18 BRPM | OXYGEN SATURATION: 96 %

## 2022-01-31 LAB
ANION GAP SERPL CALC-SCNC: 10 MMOL/L — SIGNIFICANT CHANGE UP (ref 5–17)
BUN SERPL-MCNC: 21 MG/DL — SIGNIFICANT CHANGE UP (ref 7–23)
CALCIUM SERPL-MCNC: 9.6 MG/DL — SIGNIFICANT CHANGE UP (ref 8.4–10.5)
CHLORIDE SERPL-SCNC: 100 MMOL/L — SIGNIFICANT CHANGE UP (ref 96–108)
CO2 SERPL-SCNC: 21 MMOL/L — LOW (ref 22–31)
CREAT SERPL-MCNC: 1.26 MG/DL — SIGNIFICANT CHANGE UP (ref 0.5–1.3)
GLUCOSE BLDC GLUCOMTR-MCNC: 159 MG/DL — HIGH (ref 70–99)
GLUCOSE BLDC GLUCOMTR-MCNC: 245 MG/DL — HIGH (ref 70–99)
GLUCOSE SERPL-MCNC: 152 MG/DL — HIGH (ref 70–99)
HCT VFR BLD CALC: 26.2 % — LOW (ref 39–50)
HGB BLD-MCNC: 8.5 G/DL — LOW (ref 13–17)
MCHC RBC-ENTMCNC: 30.1 PG — SIGNIFICANT CHANGE UP (ref 27–34)
MCHC RBC-ENTMCNC: 32.4 GM/DL — SIGNIFICANT CHANGE UP (ref 32–36)
MCV RBC AUTO: 92.9 FL — SIGNIFICANT CHANGE UP (ref 80–100)
NRBC # BLD: 0 /100 WBCS — SIGNIFICANT CHANGE UP (ref 0–0)
PLATELET # BLD AUTO: 171 K/UL — SIGNIFICANT CHANGE UP (ref 150–400)
POTASSIUM SERPL-MCNC: 4 MMOL/L — SIGNIFICANT CHANGE UP (ref 3.5–5.3)
POTASSIUM SERPL-SCNC: 4 MMOL/L — SIGNIFICANT CHANGE UP (ref 3.5–5.3)
RBC # BLD: 2.82 M/UL — LOW (ref 4.2–5.8)
RBC # FLD: 14.5 % — SIGNIFICANT CHANGE UP (ref 10.3–14.5)
SODIUM SERPL-SCNC: 131 MMOL/L — LOW (ref 135–145)
WBC # BLD: 4.25 K/UL — SIGNIFICANT CHANGE UP (ref 3.8–10.5)
WBC # FLD AUTO: 4.25 K/UL — SIGNIFICANT CHANGE UP (ref 3.8–10.5)

## 2022-01-31 PROCEDURE — 99239 HOSP IP/OBS DSCHRG MGMT >30: CPT | Mod: GC

## 2022-01-31 RX ORDER — HYDRALAZINE HCL 50 MG
1 TABLET ORAL
Qty: 15 | Refills: 0
Start: 2022-01-31 | End: 2022-02-04

## 2022-01-31 RX ORDER — FERROUS SULFATE 325(65) MG
1 TABLET ORAL
Qty: 60 | Refills: 0
Start: 2022-01-31 | End: 2022-03-31

## 2022-01-31 RX ORDER — DULOXETINE HYDROCHLORIDE 30 MG/1
1 CAPSULE, DELAYED RELEASE ORAL
Qty: 60 | Refills: 0
Start: 2022-01-31 | End: 2022-03-31

## 2022-01-31 RX ORDER — LEVOTHYROXINE SODIUM 125 MCG
1 TABLET ORAL
Qty: 60 | Refills: 0
Start: 2022-01-31 | End: 2022-03-31

## 2022-01-31 RX ORDER — TRAZODONE HCL 50 MG
1 TABLET ORAL
Qty: 30 | Refills: 0
Start: 2022-01-31 | End: 2022-03-01

## 2022-01-31 RX ORDER — ATORVASTATIN CALCIUM 80 MG/1
1 TABLET, FILM COATED ORAL
Qty: 60 | Refills: 0
Start: 2022-01-31 | End: 2022-03-31

## 2022-01-31 RX ORDER — PANTOPRAZOLE SODIUM 20 MG/1
1 TABLET, DELAYED RELEASE ORAL
Qty: 120 | Refills: 0
Start: 2022-01-31 | End: 2022-03-31

## 2022-01-31 RX ORDER — SITAGLIPTIN 50 MG/1
1 TABLET, FILM COATED ORAL
Qty: 120 | Refills: 0
Start: 2022-01-31 | End: 2022-03-31

## 2022-01-31 RX ORDER — METFORMIN HYDROCHLORIDE 850 MG/1
1 TABLET ORAL
Qty: 120 | Refills: 0
Start: 2022-01-31 | End: 2022-03-31

## 2022-01-31 RX ORDER — AMLODIPINE BESYLATE 2.5 MG/1
1 TABLET ORAL
Qty: 60 | Refills: 0
Start: 2022-01-31 | End: 2022-03-31

## 2022-01-31 RX ORDER — GABAPENTIN 400 MG/1
1 CAPSULE ORAL
Qty: 180 | Refills: 0
Start: 2022-01-31 | End: 2022-03-31

## 2022-01-31 RX ORDER — HYDRALAZINE HCL 50 MG
1 TABLET ORAL
Qty: 180 | Refills: 0
Start: 2022-01-31 | End: 2022-03-31

## 2022-01-31 RX ORDER — LEVOTHYROXINE SODIUM 125 MCG
1 TABLET ORAL
Qty: 5 | Refills: 0
Start: 2022-01-31 | End: 2022-02-04

## 2022-01-31 RX ORDER — SITAGLIPTIN 50 MG/1
1 TABLET, FILM COATED ORAL
Qty: 10 | Refills: 0
Start: 2022-01-31 | End: 2022-02-04

## 2022-01-31 RX ORDER — TAMSULOSIN HYDROCHLORIDE 0.4 MG/1
1 CAPSULE ORAL
Qty: 60 | Refills: 0
Start: 2022-01-31 | End: 2022-03-31

## 2022-01-31 RX ORDER — HYDRALAZINE HCL 50 MG
1 TABLET ORAL
Qty: 90 | Refills: 1
Start: 2022-01-31 | End: 2022-03-31

## 2022-01-31 RX ORDER — METFORMIN HYDROCHLORIDE 850 MG/1
1 TABLET ORAL
Qty: 10 | Refills: 0
Start: 2022-01-31 | End: 2022-02-04

## 2022-01-31 RX ORDER — METFORMIN HYDROCHLORIDE 850 MG/1
1 TABLET ORAL
Qty: 60 | Refills: 1
Start: 2022-01-31 | End: 2022-03-31

## 2022-01-31 RX ORDER — SITAGLIPTIN 50 MG/1
1 TABLET, FILM COATED ORAL
Qty: 60 | Refills: 1
Start: 2022-01-31 | End: 2022-03-31

## 2022-01-31 RX ORDER — GABAPENTIN 400 MG/1
1 CAPSULE ORAL
Qty: 90 | Refills: 1
Start: 2022-01-31 | End: 2022-03-31

## 2022-01-31 RX ORDER — GABAPENTIN 400 MG/1
1 CAPSULE ORAL
Qty: 15 | Refills: 0
Start: 2022-01-31 | End: 2022-02-04

## 2022-01-31 RX ORDER — LEVOTHYROXINE SODIUM 125 MCG
1 TABLET ORAL
Qty: 30 | Refills: 1
Start: 2022-01-31 | End: 2022-03-31

## 2022-01-31 RX ADMIN — Medication 2 UNIT(S): at 11:40

## 2022-01-31 RX ADMIN — AMLODIPINE BESYLATE 10 MILLIGRAM(S): 2.5 TABLET ORAL at 10:50

## 2022-01-31 RX ADMIN — Medication 650 MILLIGRAM(S): at 10:56

## 2022-01-31 RX ADMIN — Medication 50 MILLIGRAM(S): at 06:07

## 2022-01-31 RX ADMIN — Medication 50 MICROGRAM(S): at 06:08

## 2022-01-31 RX ADMIN — Medication 4: at 12:14

## 2022-01-31 RX ADMIN — GABAPENTIN 300 MILLIGRAM(S): 400 CAPSULE ORAL at 06:07

## 2022-01-31 RX ADMIN — PANTOPRAZOLE SODIUM 40 MILLIGRAM(S): 20 TABLET, DELAYED RELEASE ORAL at 06:08

## 2022-01-31 RX ADMIN — GABAPENTIN 300 MILLIGRAM(S): 400 CAPSULE ORAL at 13:09

## 2022-01-31 RX ADMIN — HEPARIN SODIUM 5000 UNIT(S): 5000 INJECTION INTRAVENOUS; SUBCUTANEOUS at 06:08

## 2022-01-31 RX ADMIN — CHLORHEXIDINE GLUCONATE 1 APPLICATION(S): 213 SOLUTION TOPICAL at 06:08

## 2022-01-31 RX ADMIN — Medication 2: at 09:00

## 2022-02-03 DIAGNOSIS — R11.10 VOMITING, UNSPECIFIED: ICD-10-CM

## 2022-02-03 DIAGNOSIS — E87.2 ACIDOSIS: ICD-10-CM

## 2022-02-03 DIAGNOSIS — D63.1 ANEMIA IN CHRONIC KIDNEY DISEASE: ICD-10-CM

## 2022-02-03 DIAGNOSIS — E87.5 HYPERKALEMIA: ICD-10-CM

## 2022-02-03 DIAGNOSIS — K70.30 ALCOHOLIC CIRRHOSIS OF LIVER WITHOUT ASCITES: ICD-10-CM

## 2022-02-03 DIAGNOSIS — N17.0 ACUTE KIDNEY FAILURE WITH TUBULAR NECROSIS: ICD-10-CM

## 2022-02-03 DIAGNOSIS — Z79.84 LONG TERM (CURRENT) USE OF ORAL HYPOGLYCEMIC DRUGS: ICD-10-CM

## 2022-02-03 DIAGNOSIS — N18.9 CHRONIC KIDNEY DISEASE, UNSPECIFIED: ICD-10-CM

## 2022-02-03 DIAGNOSIS — L89.152 PRESSURE ULCER OF SACRAL REGION, STAGE 2: ICD-10-CM

## 2022-02-03 DIAGNOSIS — N40.1 BENIGN PROSTATIC HYPERPLASIA WITH LOWER URINARY TRACT SYMPTOMS: ICD-10-CM

## 2022-02-03 DIAGNOSIS — R33.8 OTHER RETENTION OF URINE: ICD-10-CM

## 2022-02-03 DIAGNOSIS — E03.9 HYPOTHYROIDISM, UNSPECIFIED: ICD-10-CM

## 2022-02-03 DIAGNOSIS — I12.9 HYPERTENSIVE CHRONIC KIDNEY DISEASE WITH STAGE 1 THROUGH STAGE 4 CHRONIC KIDNEY DISEASE, OR UNSPECIFIED CHRONIC KIDNEY DISEASE: ICD-10-CM

## 2022-02-03 DIAGNOSIS — R91.1 SOLITARY PULMONARY NODULE: ICD-10-CM

## 2022-02-03 DIAGNOSIS — E78.5 HYPERLIPIDEMIA, UNSPECIFIED: ICD-10-CM

## 2022-02-03 DIAGNOSIS — F10.10 ALCOHOL ABUSE, UNCOMPLICATED: ICD-10-CM

## 2022-02-03 DIAGNOSIS — N17.9 ACUTE KIDNEY FAILURE, UNSPECIFIED: ICD-10-CM

## 2022-02-03 DIAGNOSIS — K59.31 TOXIC MEGACOLON: ICD-10-CM

## 2022-02-03 DIAGNOSIS — E87.1 HYPO-OSMOLALITY AND HYPONATREMIA: ICD-10-CM

## 2022-02-03 DIAGNOSIS — N32.0 BLADDER-NECK OBSTRUCTION: ICD-10-CM

## 2022-02-03 DIAGNOSIS — R35.0 FREQUENCY OF MICTURITION: ICD-10-CM

## 2022-02-03 DIAGNOSIS — Z93.3 COLOSTOMY STATUS: ICD-10-CM

## 2022-02-03 DIAGNOSIS — E11.22 TYPE 2 DIABETES MELLITUS WITH DIABETIC CHRONIC KIDNEY DISEASE: ICD-10-CM

## 2022-02-03 DIAGNOSIS — E11.42 TYPE 2 DIABETES MELLITUS WITH DIABETIC POLYNEUROPATHY: ICD-10-CM

## 2022-02-05 ENCOUNTER — INPATIENT (INPATIENT)
Facility: HOSPITAL | Age: 62
LOS: 10 days | Discharge: HOME | DRG: 698 | End: 2022-02-16
Attending: INTERNAL MEDICINE | Admitting: INTERNAL MEDICINE
Payer: MEDICAID

## 2022-02-05 VITALS
SYSTOLIC BLOOD PRESSURE: 173 MMHG | HEIGHT: 66 IN | TEMPERATURE: 94 F | HEART RATE: 46 BPM | DIASTOLIC BLOOD PRESSURE: 73 MMHG | OXYGEN SATURATION: 100 % | RESPIRATION RATE: 20 BRPM

## 2022-02-05 DIAGNOSIS — Z98.890 OTHER SPECIFIED POSTPROCEDURAL STATES: Chronic | ICD-10-CM

## 2022-02-05 DIAGNOSIS — Z90.49 ACQUIRED ABSENCE OF OTHER SPECIFIED PARTS OF DIGESTIVE TRACT: Chronic | ICD-10-CM

## 2022-02-05 LAB
ALBUMIN SERPL ELPH-MCNC: 3.9 G/DL — SIGNIFICANT CHANGE UP (ref 3.3–5)
ALBUMIN SERPL ELPH-MCNC: 4.3 G/DL — SIGNIFICANT CHANGE UP (ref 3.4–5)
ALP SERPL-CCNC: 101 U/L — SIGNIFICANT CHANGE UP (ref 40–120)
ALP SERPL-CCNC: 87 U/L — SIGNIFICANT CHANGE UP (ref 40–120)
ALT FLD-CCNC: 15 U/L — SIGNIFICANT CHANGE UP (ref 10–45)
ALT FLD-CCNC: 26 U/L — SIGNIFICANT CHANGE UP (ref 12–42)
AMMONIA BLD-MCNC: 15 UMOL/L — SIGNIFICANT CHANGE UP (ref 11–32)
AMPHET UR-MCNC: NEGATIVE — SIGNIFICANT CHANGE UP
ANION GAP SERPL CALC-SCNC: 14 MMOL/L — SIGNIFICANT CHANGE UP (ref 5–17)
ANION GAP SERPL CALC-SCNC: 16 MMOL/L — SIGNIFICANT CHANGE UP (ref 9–16)
ANISOCYTOSIS BLD QL: SLIGHT — SIGNIFICANT CHANGE UP
APPEARANCE UR: CLEAR — SIGNIFICANT CHANGE UP
APTT BLD: 25.1 SEC — LOW (ref 27.5–35.5)
APTT BLD: 30.7 SEC — SIGNIFICANT CHANGE UP (ref 27.5–35.5)
AST SERPL-CCNC: 14 U/L — SIGNIFICANT CHANGE UP (ref 10–40)
AST SERPL-CCNC: 19 U/L — SIGNIFICANT CHANGE UP (ref 15–37)
BACTERIA # UR AUTO: ABNORMAL /HPF
BARBITURATES UR SCN-MCNC: NEGATIVE — SIGNIFICANT CHANGE UP
BASE EXCESS BLDA CALC-SCNC: -17.7 MMOL/L — LOW (ref -2–3)
BASOPHILS # BLD AUTO: 0 K/UL — SIGNIFICANT CHANGE UP (ref 0–0.2)
BASOPHILS # BLD AUTO: 0.06 K/UL — SIGNIFICANT CHANGE UP (ref 0–0.2)
BASOPHILS NFR BLD AUTO: 0 % — SIGNIFICANT CHANGE UP (ref 0–2)
BASOPHILS NFR BLD AUTO: 0.3 % — SIGNIFICANT CHANGE UP (ref 0–2)
BENZODIAZ UR-MCNC: NEGATIVE — SIGNIFICANT CHANGE UP
BILIRUB SERPL-MCNC: 0.2 MG/DL — SIGNIFICANT CHANGE UP (ref 0.2–1.2)
BILIRUB SERPL-MCNC: 0.3 MG/DL — SIGNIFICANT CHANGE UP (ref 0.2–1.2)
BILIRUB UR-MCNC: NEGATIVE — SIGNIFICANT CHANGE UP
BUN SERPL-MCNC: 69 MG/DL — HIGH (ref 7–23)
BUN SERPL-MCNC: 73 MG/DL — HIGH (ref 7–23)
BURR CELLS BLD QL SMEAR: SLIGHT — SIGNIFICANT CHANGE UP
CALCIUM SERPL-MCNC: 9.4 MG/DL — SIGNIFICANT CHANGE UP (ref 8.4–10.5)
CALCIUM SERPL-MCNC: 9.7 MG/DL — SIGNIFICANT CHANGE UP (ref 8.5–10.5)
CHLORIDE SERPL-SCNC: 104 MMOL/L — SIGNIFICANT CHANGE UP (ref 96–108)
CHLORIDE SERPL-SCNC: 95 MMOL/L — LOW (ref 96–108)
CO2 BLDA-SCNC: 11 MMOL/L — LOW (ref 19–24)
CO2 SERPL-SCNC: 10 MMOL/L — CRITICAL LOW (ref 22–31)
CO2 SERPL-SCNC: 9 MMOL/L — CRITICAL LOW (ref 22–31)
COCAINE METAB.OTHER UR-MCNC: NEGATIVE — SIGNIFICANT CHANGE UP
COLOR SPEC: YELLOW — SIGNIFICANT CHANGE UP
CREAT SERPL-MCNC: 4.84 MG/DL — HIGH (ref 0.5–1.3)
CREAT SERPL-MCNC: 5.88 MG/DL — HIGH (ref 0.5–1.3)
DACRYOCYTES BLD QL SMEAR: SLIGHT — SIGNIFICANT CHANGE UP
DIFF PNL FLD: ABNORMAL
ELLIPTOCYTES BLD QL SMEAR: SLIGHT — SIGNIFICANT CHANGE UP
EOSINOPHIL # BLD AUTO: 0 K/UL — SIGNIFICANT CHANGE UP (ref 0–0.5)
EOSINOPHIL # BLD AUTO: 0.04 K/UL — SIGNIFICANT CHANGE UP (ref 0–0.5)
EOSINOPHIL NFR BLD AUTO: 0 % — SIGNIFICANT CHANGE UP (ref 0–6)
EOSINOPHIL NFR BLD AUTO: 0.2 % — SIGNIFICANT CHANGE UP (ref 0–6)
EPI CELLS # UR: SIGNIFICANT CHANGE UP /HPF (ref 0–5)
ETHANOL SERPL-MCNC: <3 MG/DL — SIGNIFICANT CHANGE UP
GAS PNL BLDA: SIGNIFICANT CHANGE UP
GLUCOSE BLDC GLUCOMTR-MCNC: 255 MG/DL — HIGH (ref 70–99)
GLUCOSE SERPL-MCNC: 275 MG/DL — HIGH (ref 70–99)
GLUCOSE SERPL-MCNC: 342 MG/DL — HIGH (ref 70–99)
GLUCOSE UR QL: NEGATIVE — SIGNIFICANT CHANGE UP
HCO3 BLDA-SCNC: 10 MMOL/L — CRITICAL LOW (ref 21–28)
HCT VFR BLD CALC: 29 % — LOW (ref 39–50)
HCT VFR BLD CALC: 35.6 % — LOW (ref 39–50)
HGB BLD-MCNC: 11.4 G/DL — LOW (ref 13–17)
HGB BLD-MCNC: 9.7 G/DL — LOW (ref 13–17)
IMM GRANULOCYTES NFR BLD AUTO: 1 % — SIGNIFICANT CHANGE UP (ref 0–1.5)
INR BLD: 1.06 — SIGNIFICANT CHANGE UP (ref 0.88–1.16)
INR BLD: 1.07 — SIGNIFICANT CHANGE UP (ref 0.88–1.16)
KETONES UR-MCNC: NEGATIVE — SIGNIFICANT CHANGE UP
LACTATE SERPL-SCNC: 2.9 MMOL/L — HIGH (ref 0.5–2)
LACTATE SERPL-SCNC: 3 MMOL/L — HIGH (ref 0.4–2)
LEUKOCYTE ESTERASE UR-ACNC: ABNORMAL
LIDOCAIN IGE QN: 388 U/L — SIGNIFICANT CHANGE UP (ref 73–393)
LYMPHOCYTES # BLD AUTO: 0.57 K/UL — LOW (ref 1–3.3)
LYMPHOCYTES # BLD AUTO: 0.8 K/UL — LOW (ref 1–3.3)
LYMPHOCYTES # BLD AUTO: 3.5 % — LOW (ref 13–44)
LYMPHOCYTES # BLD AUTO: 3.6 % — LOW (ref 13–44)
MAGNESIUM SERPL-MCNC: 1.8 MG/DL — SIGNIFICANT CHANGE UP (ref 1.6–2.6)
MAGNESIUM SERPL-MCNC: 2.2 MG/DL — SIGNIFICANT CHANGE UP (ref 1.6–2.6)
MANUAL SMEAR VERIFICATION: SIGNIFICANT CHANGE UP
MCHC RBC-ENTMCNC: 31.1 PG — SIGNIFICANT CHANGE UP (ref 27–34)
MCHC RBC-ENTMCNC: 31.6 PG — SIGNIFICANT CHANGE UP (ref 27–34)
MCHC RBC-ENTMCNC: 32 GM/DL — SIGNIFICANT CHANGE UP (ref 32–36)
MCHC RBC-ENTMCNC: 33.4 GM/DL — SIGNIFICANT CHANGE UP (ref 32–36)
MCV RBC AUTO: 94.5 FL — SIGNIFICANT CHANGE UP (ref 80–100)
MCV RBC AUTO: 97 FL — SIGNIFICANT CHANGE UP (ref 80–100)
METHADONE UR-MCNC: NEGATIVE — SIGNIFICANT CHANGE UP
MONOCYTES # BLD AUTO: 0.7 K/UL — SIGNIFICANT CHANGE UP (ref 0–0.9)
MONOCYTES # BLD AUTO: 1.46 K/UL — HIGH (ref 0–0.9)
MONOCYTES NFR BLD AUTO: 4.3 % — SIGNIFICANT CHANGE UP (ref 2–14)
MONOCYTES NFR BLD AUTO: 6.6 % — SIGNIFICANT CHANGE UP (ref 2–14)
NEUTROPHILS # BLD AUTO: 15.1 K/UL — HIGH (ref 1.8–7.4)
NEUTROPHILS # BLD AUTO: 19.55 K/UL — HIGH (ref 1.8–7.4)
NEUTROPHILS NFR BLD AUTO: 88.3 % — HIGH (ref 43–77)
NEUTROPHILS NFR BLD AUTO: 88.7 % — HIGH (ref 43–77)
NEUTS BAND # BLD: 3.5 % — SIGNIFICANT CHANGE UP (ref 0–8)
NITRITE UR-MCNC: NEGATIVE — SIGNIFICANT CHANGE UP
NRBC # BLD: 0 /100 WBCS — SIGNIFICANT CHANGE UP (ref 0–0)
OPIATES UR-MCNC: NEGATIVE — SIGNIFICANT CHANGE UP
OVALOCYTES BLD QL SMEAR: SLIGHT — SIGNIFICANT CHANGE UP
PCO2 BLDA: 29 MMHG — LOW (ref 35–48)
PCO2 BLDA: 31 MMHG — LOW (ref 35–48)
PCP SPEC-MCNC: SIGNIFICANT CHANGE UP
PCP UR-MCNC: NEGATIVE — SIGNIFICANT CHANGE UP
PH BLDA: 7.13 — CRITICAL LOW (ref 7.35–7.45)
PH BLDA: 7.14 — CRITICAL LOW (ref 7.35–7.45)
PH UR: 6.5 — SIGNIFICANT CHANGE UP (ref 5–8)
PHOSPHATE SERPL-MCNC: 5.8 MG/DL — HIGH (ref 2.5–4.5)
PLAT MORPH BLD: NORMAL — SIGNIFICANT CHANGE UP
PLATELET # BLD AUTO: 204 K/UL — SIGNIFICANT CHANGE UP (ref 150–400)
PLATELET # BLD AUTO: 327 K/UL — SIGNIFICANT CHANGE UP (ref 150–400)
PO2 BLDA: 190 MMHG — HIGH (ref 83–108)
PO2 BLDA: 302 MMHG — HIGH (ref 83–108)
POIKILOCYTOSIS BLD QL AUTO: SLIGHT — SIGNIFICANT CHANGE UP
POLYCHROMASIA BLD QL SMEAR: SLIGHT — SIGNIFICANT CHANGE UP
POTASSIUM SERPL-MCNC: 7.1 MMOL/L — CRITICAL HIGH (ref 3.5–5.3)
POTASSIUM SERPL-MCNC: 8.1 MMOL/L — CRITICAL HIGH (ref 3.5–5.3)
POTASSIUM SERPL-MCNC: SIGNIFICANT CHANGE UP MMOL/L (ref 3.5–5.3)
POTASSIUM SERPL-SCNC: 7.1 MMOL/L — CRITICAL HIGH (ref 3.5–5.3)
POTASSIUM SERPL-SCNC: 8.1 MMOL/L — CRITICAL HIGH (ref 3.5–5.3)
POTASSIUM SERPL-SCNC: SIGNIFICANT CHANGE UP MMOL/L (ref 3.5–5.3)
PROT SERPL-MCNC: 7.3 G/DL — SIGNIFICANT CHANGE UP (ref 6–8.3)
PROT SERPL-MCNC: 9.4 G/DL — HIGH (ref 6.4–8.2)
PROT UR-MCNC: >=300 MG/DL
PROTHROM AB SERPL-ACNC: 12.6 SEC — SIGNIFICANT CHANGE UP (ref 10.6–13.6)
PROTHROM AB SERPL-ACNC: 12.7 SEC — SIGNIFICANT CHANGE UP (ref 10.6–13.6)
RBC # BLD: 3.07 M/UL — LOW (ref 4.2–5.8)
RBC # BLD: 3.67 M/UL — LOW (ref 4.2–5.8)
RBC # FLD: 14.9 % — HIGH (ref 10.3–14.5)
RBC # FLD: 14.9 % — HIGH (ref 10.3–14.5)
RBC BLD AUTO: ABNORMAL
RBC CASTS # UR COMP ASSIST: > 10 /HPF
SAO2 % BLDA: 99.3 % — HIGH (ref 94–98)
SAO2 % BLDA: 99.8 % — HIGH (ref 94–98)
SARS-COV-2 RNA SPEC QL NAA+PROBE: SIGNIFICANT CHANGE UP
SODIUM SERPL-SCNC: 121 MMOL/L — LOW (ref 132–145)
SODIUM SERPL-SCNC: 127 MMOL/L — LOW (ref 135–145)
SP GR SPEC: 1.02 — SIGNIFICANT CHANGE UP (ref 1–1.03)
THC UR QL: NEGATIVE — SIGNIFICANT CHANGE UP
TROPONIN I, HIGH SENSITIVITY RESULT: 13 NG/L — SIGNIFICANT CHANGE UP
UROBILINOGEN FLD QL: 0.2 E.U./DL — SIGNIFICANT CHANGE UP
WBC # BLD: 16.38 K/UL — HIGH (ref 3.8–10.5)
WBC # BLD: 22.13 K/UL — HIGH (ref 3.8–10.5)
WBC # FLD AUTO: 16.38 K/UL — HIGH (ref 3.8–10.5)
WBC # FLD AUTO: 22.13 K/UL — HIGH (ref 3.8–10.5)
WBC UR QL: ABNORMAL /HPF

## 2022-02-05 PROCEDURE — 74176 CT ABD & PELVIS W/O CONTRAST: CPT | Mod: 26

## 2022-02-05 PROCEDURE — 70450 CT HEAD/BRAIN W/O DYE: CPT | Mod: 26

## 2022-02-05 PROCEDURE — 93010 ELECTROCARDIOGRAM REPORT: CPT | Mod: 76

## 2022-02-05 PROCEDURE — 99291 CRITICAL CARE FIRST HOUR: CPT | Mod: 25

## 2022-02-05 PROCEDURE — 99291 CRITICAL CARE FIRST HOUR: CPT

## 2022-02-05 PROCEDURE — 93010 ELECTROCARDIOGRAM REPORT: CPT

## 2022-02-05 PROCEDURE — 71250 CT THORAX DX C-: CPT | Mod: 26

## 2022-02-05 RX ORDER — ALBUTEROL 90 UG/1
2.5 AEROSOL, METERED ORAL ONCE
Refills: 0 | Status: COMPLETED | OUTPATIENT
Start: 2022-02-05 | End: 2022-02-05

## 2022-02-05 RX ORDER — CALCIUM GLUCONATE 100 MG/ML
2 VIAL (ML) INTRAVENOUS ONCE
Refills: 0 | Status: COMPLETED | OUTPATIENT
Start: 2022-02-05 | End: 2022-02-05

## 2022-02-05 RX ORDER — VANCOMYCIN HCL 1 G
1000 VIAL (EA) INTRAVENOUS ONCE
Refills: 0 | Status: COMPLETED | OUTPATIENT
Start: 2022-02-05 | End: 2022-02-05

## 2022-02-05 RX ORDER — CALCIUM CHLORIDE
1000 POWDER (GRAM) MISCELLANEOUS ONCE
Refills: 0 | Status: COMPLETED | OUTPATIENT
Start: 2022-02-05 | End: 2022-02-05

## 2022-02-05 RX ORDER — SODIUM BICARBONATE 1 MEQ/ML
150 SYRINGE (ML) INTRAVENOUS ONCE
Refills: 0 | Status: DISCONTINUED | OUTPATIENT
Start: 2022-02-05 | End: 2022-02-05

## 2022-02-05 RX ORDER — SODIUM CHLORIDE 9 MG/ML
2500 INJECTION INTRAMUSCULAR; INTRAVENOUS; SUBCUTANEOUS ONCE
Refills: 0 | Status: COMPLETED | OUTPATIENT
Start: 2022-02-05 | End: 2022-02-05

## 2022-02-05 RX ORDER — SODIUM ZIRCONIUM CYCLOSILICATE 10 G/10G
10 POWDER, FOR SUSPENSION ORAL ONCE
Refills: 0 | Status: COMPLETED | OUTPATIENT
Start: 2022-02-05 | End: 2022-02-05

## 2022-02-05 RX ORDER — SODIUM BICARBONATE 1 MEQ/ML
50 SYRINGE (ML) INTRAVENOUS ONCE
Refills: 0 | Status: COMPLETED | OUTPATIENT
Start: 2022-02-05 | End: 2022-02-05

## 2022-02-05 RX ORDER — CALCIUM GLUCONATE 100 MG/ML
1 VIAL (ML) INTRAVENOUS ONCE
Refills: 0 | Status: COMPLETED | OUTPATIENT
Start: 2022-02-05 | End: 2022-02-05

## 2022-02-05 RX ORDER — DEXTROSE 10 % IN WATER 10 %
250 INTRAVENOUS SOLUTION INTRAVENOUS ONCE
Refills: 0 | Status: DISCONTINUED | OUTPATIENT
Start: 2022-02-05 | End: 2022-02-05

## 2022-02-05 RX ORDER — PIPERACILLIN AND TAZOBACTAM 4; .5 G/20ML; G/20ML
3.38 INJECTION, POWDER, LYOPHILIZED, FOR SOLUTION INTRAVENOUS ONCE
Refills: 0 | Status: COMPLETED | OUTPATIENT
Start: 2022-02-05 | End: 2022-02-05

## 2022-02-05 RX ORDER — SODIUM CHLORIDE 9 MG/ML
1000 INJECTION, SOLUTION INTRAVENOUS
Refills: 0 | Status: DISCONTINUED | OUTPATIENT
Start: 2022-02-05 | End: 2022-02-06

## 2022-02-05 RX ORDER — INSULIN HUMAN 100 [IU]/ML
10 INJECTION, SOLUTION SUBCUTANEOUS ONCE
Refills: 0 | Status: COMPLETED | OUTPATIENT
Start: 2022-02-05 | End: 2022-02-05

## 2022-02-05 RX ORDER — DEXTROSE 10 % IN WATER 10 %
250 INTRAVENOUS SOLUTION INTRAVENOUS ONCE
Refills: 0 | Status: COMPLETED | OUTPATIENT
Start: 2022-02-05 | End: 2022-02-05

## 2022-02-05 RX ORDER — MIDAZOLAM HYDROCHLORIDE 1 MG/ML
2 INJECTION, SOLUTION INTRAMUSCULAR; INTRAVENOUS ONCE
Refills: 0 | Status: DISCONTINUED | OUTPATIENT
Start: 2022-02-05 | End: 2022-02-05

## 2022-02-05 RX ORDER — DEXTROSE 50 % IN WATER 50 %
50 SYRINGE (ML) INTRAVENOUS ONCE
Refills: 0 | Status: DISCONTINUED | OUTPATIENT
Start: 2022-02-05 | End: 2022-02-05

## 2022-02-05 RX ORDER — ALBUTEROL 90 UG/1
2.5 AEROSOL, METERED ORAL
Refills: 0 | Status: COMPLETED | OUTPATIENT
Start: 2022-02-05 | End: 2022-02-05

## 2022-02-05 RX ADMIN — Medication 200 GRAM(S): at 23:33

## 2022-02-05 RX ADMIN — ALBUTEROL 2.5 MILLIGRAM(S): 90 AEROSOL, METERED ORAL at 19:33

## 2022-02-05 RX ADMIN — SODIUM ZIRCONIUM CYCLOSILICATE 10 GRAM(S): 10 POWDER, FOR SUSPENSION ORAL at 23:50

## 2022-02-05 RX ADMIN — Medication 100 GRAM(S): at 19:23

## 2022-02-05 RX ADMIN — ALBUTEROL 2.5 MILLIGRAM(S): 90 AEROSOL, METERED ORAL at 19:45

## 2022-02-05 RX ADMIN — Medication 50 MILLIEQUIVALENT(S): at 22:35

## 2022-02-05 RX ADMIN — PIPERACILLIN AND TAZOBACTAM 200 GRAM(S): 4; .5 INJECTION, POWDER, LYOPHILIZED, FOR SOLUTION INTRAVENOUS at 19:23

## 2022-02-05 RX ADMIN — ALBUTEROL 2.5 MILLIGRAM(S): 90 AEROSOL, METERED ORAL at 19:49

## 2022-02-05 RX ADMIN — INSULIN HUMAN 10 UNIT(S): 100 INJECTION, SOLUTION SUBCUTANEOUS at 19:46

## 2022-02-05 RX ADMIN — MIDAZOLAM HYDROCHLORIDE 2 MILLIGRAM(S): 1 INJECTION, SOLUTION INTRAMUSCULAR; INTRAVENOUS at 19:00

## 2022-02-05 RX ADMIN — SODIUM CHLORIDE 2500 MILLILITER(S): 9 INJECTION INTRAMUSCULAR; INTRAVENOUS; SUBCUTANEOUS at 19:23

## 2022-02-05 RX ADMIN — INSULIN HUMAN 10 UNIT(S): 100 INJECTION, SOLUTION SUBCUTANEOUS at 23:33

## 2022-02-05 RX ADMIN — Medication 1000 MILLIGRAM(S): at 18:30

## 2022-02-05 RX ADMIN — Medication 250 MILLIGRAM(S): at 20:18

## 2022-02-05 RX ADMIN — Medication 750 MILLILITER(S): at 19:45

## 2022-02-05 RX ADMIN — Medication 50 MILLIEQUIVALENT(S): at 20:10

## 2022-02-05 NOTE — ED ADULT NURSE NOTE - OBJECTIVE STATEMENT
pt a&ox1 BIBA from [facility] shelter for AMS and hyperglycemia. brought into resus bay, team at bedside. tongue smacking noted. responsive to name. has RLQ ileostomy with small amount of liquid stool, no bag upon arrival. 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 interventions in place. will continue to monitor. pt a&ox1 BIBA from [facility] shelter for AMS and hyperglycemia. brought into resus bay, team at bedside. tongue smacking noted. responsive to name. has RLQ ileostomy with small amount of liquid stool, no bag upon arrival. rectal temp 94.9F. pt placed on warming blanket. 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 interventions in place. will continue to monitor.

## 2022-02-05 NOTE — ED PROVIDER NOTE - OBJECTIVE STATEMENT
63yo M hx of etoh cirrhosis, hx of toxic megacolon s/p colectomy with ostomy, hx of numerous presentations for hyperkalemia and hyponatremia, presents with AMS x1 day.  Pt unable to provide a history

## 2022-02-05 NOTE — ED PROVIDER NOTE - PHYSICAL EXAMINATION
General: lethargic, arousable to verbal stimuli; recurrent lip smacking  Head: NCAT  Eyes: PERRL  Heart: bradycardic, regular rhythm  Lungs: CTAB  Abd: soft, NTND; ostomy pink and patent  Neuro: moves all 4 extemities equally

## 2022-02-05 NOTE — ED ADULT NURSE NOTE - NSFALLRSKASSESSTYPE_ED_ALL_ED
Patient's mom, Christina, is calling stating the Invega \"is 100% positive.\"  Mood and behavior are much better.  The only thing mom is noticing is \"it wears off around 4pm, then we are back to the witching hour until he falls asleep.\"       
We would recommend to add Invega 1.5 mg at 4 pm  
Initial (On Arrival)

## 2022-02-05 NOTE — ED PROVIDER NOTE - CLINICAL SUMMARY MEDICAL DECISION MAKING FREE TEXT BOX
Pt w hx of etoh cirrhosis, hx of toxic megacolon s/p colectomy and ostomy, hx of numerous presentations for renal failure w hyperkalemia, presents with AMS x1 day.  On arrival to ED, pt hypothermic, bradycardic, with recurrent lip smacking, concerning for tardive dyskinesia.  Pt given benzos with resolution of lip smacking.  On my evaluation, pt lethargic but arousable to verbal stimuli.  No e/o head trauma.  Abd soft NDNT with ostomy pink and patent.  no focal neuro deficit.  EKG junctional bradycardia with peaked T waves and borderline QRS.  Pt given IV calcium empirically for hyperkalemia with improvement in heart rate.  Started on vanc/ zosyn for empiric sepsis coverage.    CT head negative for acute process.  CT chest/ abd/ pelvis negative for obvious acute process.  Labs show markedly elevated WBC, elevated lactate, UA positive for UTI, Cr markedly elevated, K markedly elevated, and significant acidosis; presentation c/w urosepsis with acute renal failure.  K managed medically w insulin/ D50, albuterol, bicarb, and further Ca. Repeat EKG still w bradycardia, but QRS narrow and T waves no longer peaked.    Mental status still poor in ED despite medical treatment, possibly due in part to benzo given on arrival vs. sepsis/ renal failure.  Plan to transfer emergently to  ICU for further management, possible emergent dialysis.

## 2022-02-05 NOTE — ED PROVIDER NOTE - CRITICAL CARE ATTENDING CONTRIBUTION TO CARE
The patient was seen immediately upon arrival due to a high probability of imminent or life-threatening deterioration secondary to hyperkalemia, which required my direct attention, intervention, and personal management at the bedside. I have personally provided critical care time exclusive of time spent on separately billable procedures. Time includes review of laboratory data, radiology results, discussion with consultants, discussion with the patient's family, and monitoring for potential decompensation.

## 2022-02-05 NOTE — ED ADULT NURSE REASSESSMENT NOTE - NS ED NURSE REASSESS COMMENT FT1
Received Pt from previous RN lying on stretcher responsive to voice, on NRB@12LPM, CCM in place, Multiple IV sites patent with IV ABX and fluids infusing as ordered, Warming blanket in place with rectal probe. Guajardo cath to BSD. External zoll monitor in place. Awaiting bed status.

## 2022-02-06 LAB
ALBUMIN SERPL ELPH-MCNC: 4 G/DL — SIGNIFICANT CHANGE UP (ref 3.3–5)
ALP SERPL-CCNC: 79 U/L — SIGNIFICANT CHANGE UP (ref 40–120)
ALT FLD-CCNC: 13 U/L — SIGNIFICANT CHANGE UP (ref 10–45)
ANION GAP SERPL CALC-SCNC: 12 MMOL/L — SIGNIFICANT CHANGE UP (ref 5–17)
ANION GAP SERPL CALC-SCNC: 12 MMOL/L — SIGNIFICANT CHANGE UP (ref 5–17)
ANION GAP SERPL CALC-SCNC: 13 MMOL/L — SIGNIFICANT CHANGE UP (ref 5–17)
ANION GAP SERPL CALC-SCNC: 15 MMOL/L — SIGNIFICANT CHANGE UP (ref 5–17)
AST SERPL-CCNC: 14 U/L — SIGNIFICANT CHANGE UP (ref 10–40)
B-OH-BUTYR SERPL-SCNC: 0.1 MMOL/L — SIGNIFICANT CHANGE UP
BASE EXCESS BLDV CALC-SCNC: -7.6 MMOL/L — LOW (ref -2–3)
BASOPHILS # BLD AUTO: 0.02 K/UL — SIGNIFICANT CHANGE UP (ref 0–0.2)
BASOPHILS NFR BLD AUTO: 0.2 % — SIGNIFICANT CHANGE UP (ref 0–2)
BILIRUB SERPL-MCNC: 0.2 MG/DL — SIGNIFICANT CHANGE UP (ref 0.2–1.2)
BLD GP AB SCN SERPL QL: NEGATIVE — SIGNIFICANT CHANGE UP
BUN SERPL-MCNC: 40 MG/DL — HIGH (ref 7–23)
BUN SERPL-MCNC: 45 MG/DL — HIGH (ref 7–23)
BUN SERPL-MCNC: 50 MG/DL — HIGH (ref 7–23)
BUN SERPL-MCNC: 64 MG/DL — HIGH (ref 7–23)
CA-I SERPL-SCNC: 1.33 MMOL/L — SIGNIFICANT CHANGE UP (ref 1.15–1.33)
CALCIUM SERPL-MCNC: 9.2 MG/DL — SIGNIFICANT CHANGE UP (ref 8.4–10.5)
CALCIUM SERPL-MCNC: 9.2 MG/DL — SIGNIFICANT CHANGE UP (ref 8.4–10.5)
CALCIUM SERPL-MCNC: 9.7 MG/DL — SIGNIFICANT CHANGE UP (ref 8.4–10.5)
CALCIUM SERPL-MCNC: 9.8 MG/DL — SIGNIFICANT CHANGE UP (ref 8.4–10.5)
CHLORIDE SERPL-SCNC: 101 MMOL/L — SIGNIFICANT CHANGE UP (ref 96–108)
CHLORIDE SERPL-SCNC: 103 MMOL/L — SIGNIFICANT CHANGE UP (ref 96–108)
CHLORIDE SERPL-SCNC: 98 MMOL/L — SIGNIFICANT CHANGE UP (ref 96–108)
CHLORIDE SERPL-SCNC: 98 MMOL/L — SIGNIFICANT CHANGE UP (ref 96–108)
CO2 BLDV-SCNC: 21.1 MMOL/L — LOW (ref 22–26)
CO2 SERPL-SCNC: 14 MMOL/L — LOW (ref 22–31)
CO2 SERPL-SCNC: 16 MMOL/L — LOW (ref 22–31)
CO2 SERPL-SCNC: 21 MMOL/L — LOW (ref 22–31)
CO2 SERPL-SCNC: 23 MMOL/L — SIGNIFICANT CHANGE UP (ref 22–31)
CREAT ?TM UR-MCNC: 28 MG/DL — SIGNIFICANT CHANGE UP
CREAT SERPL-MCNC: 1.96 MG/DL — HIGH (ref 0.5–1.3)
CREAT SERPL-MCNC: 2.32 MG/DL — HIGH (ref 0.5–1.3)
CREAT SERPL-MCNC: 3.09 MG/DL — HIGH (ref 0.5–1.3)
CREAT SERPL-MCNC: 4.01 MG/DL — HIGH (ref 0.5–1.3)
EOSINOPHIL # BLD AUTO: 0.08 K/UL — SIGNIFICANT CHANGE UP (ref 0–0.5)
EOSINOPHIL NFR BLD AUTO: 1 % — SIGNIFICANT CHANGE UP (ref 0–6)
FERRITIN SERPL-MCNC: 878 NG/ML — HIGH (ref 30–400)
GAS PNL BLDA: SIGNIFICANT CHANGE UP
GAS PNL BLDV: 126 MMOL/L — LOW (ref 136–145)
GAS PNL BLDV: SIGNIFICANT CHANGE UP
GAS PNL BLDV: SIGNIFICANT CHANGE UP
GLUCOSE BLDC GLUCOMTR-MCNC: 132 MG/DL — HIGH (ref 70–99)
GLUCOSE BLDC GLUCOMTR-MCNC: 192 MG/DL — HIGH (ref 70–99)
GLUCOSE BLDC GLUCOMTR-MCNC: 202 MG/DL — HIGH (ref 70–99)
GLUCOSE BLDC GLUCOMTR-MCNC: 215 MG/DL — HIGH (ref 70–99)
GLUCOSE BLDC GLUCOMTR-MCNC: 247 MG/DL — HIGH (ref 70–99)
GLUCOSE BLDC GLUCOMTR-MCNC: 292 MG/DL — HIGH (ref 70–99)
GLUCOSE SERPL-MCNC: 174 MG/DL — HIGH (ref 70–99)
GLUCOSE SERPL-MCNC: 199 MG/DL — HIGH (ref 70–99)
GLUCOSE SERPL-MCNC: 230 MG/DL — HIGH (ref 70–99)
GLUCOSE SERPL-MCNC: 270 MG/DL — HIGH (ref 70–99)
HCO3 BLDV-SCNC: 20 MMOL/L — LOW (ref 22–29)
HCT VFR BLD CALC: 27.5 % — LOW (ref 39–50)
HGB BLD-MCNC: 9 G/DL — LOW (ref 13–17)
IMM GRANULOCYTES NFR BLD AUTO: 0.5 % — SIGNIFICANT CHANGE UP (ref 0–1.5)
IRON SATN MFR SERPL: 16 % — SIGNIFICANT CHANGE UP (ref 16–55)
IRON SATN MFR SERPL: 35 UG/DL — LOW (ref 45–165)
LACTATE SERPL-SCNC: 1.6 MMOL/L — SIGNIFICANT CHANGE UP (ref 0.5–2)
LYMPHOCYTES # BLD AUTO: 0.9 K/UL — LOW (ref 1–3.3)
LYMPHOCYTES # BLD AUTO: 11.1 % — LOW (ref 13–44)
MAGNESIUM SERPL-MCNC: 1.7 MG/DL — SIGNIFICANT CHANGE UP (ref 1.6–2.6)
MAGNESIUM SERPL-MCNC: 1.8 MG/DL — SIGNIFICANT CHANGE UP (ref 1.6–2.6)
MCHC RBC-ENTMCNC: 30.2 PG — SIGNIFICANT CHANGE UP (ref 27–34)
MCHC RBC-ENTMCNC: 32.7 GM/DL — SIGNIFICANT CHANGE UP (ref 32–36)
MCV RBC AUTO: 92.3 FL — SIGNIFICANT CHANGE UP (ref 80–100)
MONOCYTES # BLD AUTO: 0.9 K/UL — SIGNIFICANT CHANGE UP (ref 0–0.9)
MONOCYTES NFR BLD AUTO: 11.1 % — SIGNIFICANT CHANGE UP (ref 2–14)
NEUTROPHILS # BLD AUTO: 6.17 K/UL — SIGNIFICANT CHANGE UP (ref 1.8–7.4)
NEUTROPHILS NFR BLD AUTO: 76.1 % — SIGNIFICANT CHANGE UP (ref 43–77)
NRBC # BLD: 0 /100 WBCS — SIGNIFICANT CHANGE UP (ref 0–0)
OSMOLALITY UR: 226 MOSM/KG — LOW (ref 300–900)
PCO2 BLDV: 46 MMHG — SIGNIFICANT CHANGE UP (ref 42–55)
PH BLDV: 7.24 — LOW (ref 7.32–7.43)
PHOSPHATE SERPL-MCNC: 3.7 MG/DL — SIGNIFICANT CHANGE UP (ref 2.5–4.5)
PHOSPHATE SERPL-MCNC: 5 MG/DL — HIGH (ref 2.5–4.5)
PLATELET # BLD AUTO: 178 K/UL — SIGNIFICANT CHANGE UP (ref 150–400)
PO2 BLDV: 37 MMHG — SIGNIFICANT CHANGE UP (ref 25–45)
POTASSIUM BLDV-SCNC: 7.8 MMOL/L — CRITICAL HIGH (ref 3.5–5.1)
POTASSIUM SERPL-MCNC: 5.1 MMOL/L — SIGNIFICANT CHANGE UP (ref 3.5–5.3)
POTASSIUM SERPL-MCNC: 5.2 MMOL/L — SIGNIFICANT CHANGE UP (ref 3.5–5.3)
POTASSIUM SERPL-MCNC: 5.7 MMOL/L — HIGH (ref 3.5–5.3)
POTASSIUM SERPL-MCNC: 5.8 MMOL/L — HIGH (ref 3.5–5.3)
POTASSIUM SERPL-SCNC: 5.1 MMOL/L — SIGNIFICANT CHANGE UP (ref 3.5–5.3)
POTASSIUM SERPL-SCNC: 5.2 MMOL/L — SIGNIFICANT CHANGE UP (ref 3.5–5.3)
POTASSIUM SERPL-SCNC: 5.7 MMOL/L — HIGH (ref 3.5–5.3)
POTASSIUM SERPL-SCNC: 5.8 MMOL/L — HIGH (ref 3.5–5.3)
PROT SERPL-MCNC: 6.9 G/DL — SIGNIFICANT CHANGE UP (ref 6–8.3)
RBC # BLD: 2.98 M/UL — LOW (ref 4.2–5.8)
RBC # FLD: 14.8 % — HIGH (ref 10.3–14.5)
RH IG SCN BLD-IMP: POSITIVE — SIGNIFICANT CHANGE UP
SAO2 % BLDV: 71.7 % — SIGNIFICANT CHANGE UP (ref 67–88)
SODIUM SERPL-SCNC: 126 MMOL/L — LOW (ref 135–145)
SODIUM SERPL-SCNC: 132 MMOL/L — LOW (ref 135–145)
SODIUM SERPL-SCNC: 134 MMOL/L — LOW (ref 135–145)
SODIUM SERPL-SCNC: 134 MMOL/L — LOW (ref 135–145)
SODIUM UR-SCNC: 38 MMOL/L — SIGNIFICANT CHANGE UP
TIBC SERPL-MCNC: 213 UG/DL — LOW (ref 220–430)
UIBC SERPL-MCNC: 178 UG/DL — SIGNIFICANT CHANGE UP (ref 110–370)
UUN UR-MCNC: 212 MG/DL — SIGNIFICANT CHANGE UP
WBC # BLD: 8.11 K/UL — SIGNIFICANT CHANGE UP (ref 3.8–10.5)
WBC # FLD AUTO: 8.11 K/UL — SIGNIFICANT CHANGE UP (ref 3.8–10.5)

## 2022-02-06 PROCEDURE — 36600 WITHDRAWAL OF ARTERIAL BLOOD: CPT

## 2022-02-06 PROCEDURE — 99223 1ST HOSP IP/OBS HIGH 75: CPT | Mod: GC

## 2022-02-06 RX ORDER — INSULIN HUMAN 100 [IU]/ML
10 INJECTION, SOLUTION SUBCUTANEOUS ONCE
Refills: 0 | Status: COMPLETED | OUTPATIENT
Start: 2022-02-06 | End: 2022-02-06

## 2022-02-06 RX ORDER — FERROUS SULFATE 325(65) MG
325 TABLET ORAL DAILY
Refills: 0 | Status: DISCONTINUED | OUTPATIENT
Start: 2022-02-07 | End: 2022-02-16

## 2022-02-06 RX ORDER — SODIUM CHLORIDE 9 MG/ML
1000 INJECTION, SOLUTION INTRAVENOUS
Refills: 0 | Status: DISCONTINUED | OUTPATIENT
Start: 2022-02-06 | End: 2022-02-16

## 2022-02-06 RX ORDER — CEFTRIAXONE 500 MG/1
1000 INJECTION, POWDER, FOR SOLUTION INTRAMUSCULAR; INTRAVENOUS EVERY 24 HOURS
Refills: 0 | Status: COMPLETED | OUTPATIENT
Start: 2022-02-06 | End: 2022-02-08

## 2022-02-06 RX ORDER — SODIUM ZIRCONIUM CYCLOSILICATE 10 G/10G
10 POWDER, FOR SUSPENSION ORAL EVERY 8 HOURS
Refills: 0 | Status: COMPLETED | OUTPATIENT
Start: 2022-02-06 | End: 2022-02-06

## 2022-02-06 RX ORDER — GLUCAGON INJECTION, SOLUTION 0.5 MG/.1ML
1 INJECTION, SOLUTION SUBCUTANEOUS ONCE
Refills: 0 | Status: DISCONTINUED | OUTPATIENT
Start: 2022-02-06 | End: 2022-02-16

## 2022-02-06 RX ORDER — DEXTROSE 10 % IN WATER 10 %
250 INTRAVENOUS SOLUTION INTRAVENOUS ONCE
Refills: 0 | Status: COMPLETED | OUTPATIENT
Start: 2022-02-06 | End: 2022-02-06

## 2022-02-06 RX ORDER — INSULIN LISPRO 100/ML
VIAL (ML) SUBCUTANEOUS
Refills: 0 | Status: DISCONTINUED | OUTPATIENT
Start: 2022-02-06 | End: 2022-02-06

## 2022-02-06 RX ORDER — PIPERACILLIN AND TAZOBACTAM 4; .5 G/20ML; G/20ML
2.25 INJECTION, POWDER, LYOPHILIZED, FOR SOLUTION INTRAVENOUS EVERY 8 HOURS
Refills: 0 | Status: DISCONTINUED | OUTPATIENT
Start: 2022-02-06 | End: 2022-02-06

## 2022-02-06 RX ORDER — TAMSULOSIN HYDROCHLORIDE 0.4 MG/1
0.4 CAPSULE ORAL AT BEDTIME
Refills: 0 | Status: DISCONTINUED | OUTPATIENT
Start: 2022-02-07 | End: 2022-02-16

## 2022-02-06 RX ORDER — DEXTROSE 50 % IN WATER 50 %
25 SYRINGE (ML) INTRAVENOUS ONCE
Refills: 0 | Status: DISCONTINUED | OUTPATIENT
Start: 2022-02-06 | End: 2022-02-16

## 2022-02-06 RX ORDER — DESMOPRESSIN ACETATE 0.1 MG/1
2 TABLET ORAL ONCE
Refills: 0 | Status: COMPLETED | OUTPATIENT
Start: 2022-02-06 | End: 2022-02-06

## 2022-02-06 RX ORDER — DESMOPRESSIN ACETATE 0.1 MG/1
2 TABLET ORAL ONCE
Refills: 0 | Status: DISCONTINUED | OUTPATIENT
Start: 2022-02-06 | End: 2022-02-06

## 2022-02-06 RX ORDER — ATORVASTATIN CALCIUM 80 MG/1
10 TABLET, FILM COATED ORAL AT BEDTIME
Refills: 0 | Status: DISCONTINUED | OUTPATIENT
Start: 2022-02-07 | End: 2022-02-16

## 2022-02-06 RX ORDER — DEXTROSE 50 % IN WATER 50 %
50 SYRINGE (ML) INTRAVENOUS ONCE
Refills: 0 | Status: DISCONTINUED | OUTPATIENT
Start: 2022-02-06 | End: 2022-02-06

## 2022-02-06 RX ORDER — DEXTROSE 50 % IN WATER 50 %
25 SYRINGE (ML) INTRAVENOUS ONCE
Refills: 0 | Status: DISCONTINUED | OUTPATIENT
Start: 2022-02-06 | End: 2022-02-06

## 2022-02-06 RX ORDER — PANTOPRAZOLE SODIUM 20 MG/1
40 TABLET, DELAYED RELEASE ORAL
Refills: 0 | Status: DISCONTINUED | OUTPATIENT
Start: 2022-02-06 | End: 2022-02-16

## 2022-02-06 RX ORDER — INSULIN LISPRO 100/ML
VIAL (ML) SUBCUTANEOUS
Refills: 0 | Status: DISCONTINUED | OUTPATIENT
Start: 2022-02-06 | End: 2022-02-10

## 2022-02-06 RX ORDER — INSULIN LISPRO 100/ML
VIAL (ML) SUBCUTANEOUS AT BEDTIME
Refills: 0 | Status: DISCONTINUED | OUTPATIENT
Start: 2022-02-06 | End: 2022-02-06

## 2022-02-06 RX ORDER — HEPARIN SODIUM 5000 [USP'U]/ML
5000 INJECTION INTRAVENOUS; SUBCUTANEOUS EVERY 8 HOURS
Refills: 0 | Status: DISCONTINUED | OUTPATIENT
Start: 2022-02-06 | End: 2022-02-16

## 2022-02-06 RX ORDER — SODIUM CHLORIDE 9 MG/ML
1000 INJECTION, SOLUTION INTRAVENOUS ONCE
Refills: 0 | Status: COMPLETED | OUTPATIENT
Start: 2022-02-06 | End: 2022-02-06

## 2022-02-06 RX ORDER — DEXTROSE 50 % IN WATER 50 %
12.5 SYRINGE (ML) INTRAVENOUS ONCE
Refills: 0 | Status: DISCONTINUED | OUTPATIENT
Start: 2022-02-06 | End: 2022-02-16

## 2022-02-06 RX ORDER — LEVOTHYROXINE SODIUM 125 MCG
50 TABLET ORAL DAILY
Refills: 0 | Status: DISCONTINUED | OUTPATIENT
Start: 2022-02-06 | End: 2022-02-16

## 2022-02-06 RX ORDER — DEXTROSE 50 % IN WATER 50 %
12.5 SYRINGE (ML) INTRAVENOUS ONCE
Refills: 0 | Status: DISCONTINUED | OUTPATIENT
Start: 2022-02-06 | End: 2022-02-06

## 2022-02-06 RX ORDER — DEXTROSE 50 % IN WATER 50 %
15 SYRINGE (ML) INTRAVENOUS ONCE
Refills: 0 | Status: DISCONTINUED | OUTPATIENT
Start: 2022-02-06 | End: 2022-02-16

## 2022-02-06 RX ORDER — SODIUM CHLORIDE 9 MG/ML
1000 INJECTION, SOLUTION INTRAVENOUS
Refills: 0 | Status: DISCONTINUED | OUTPATIENT
Start: 2022-02-06 | End: 2022-02-06

## 2022-02-06 RX ORDER — INSULIN LISPRO 100/ML
VIAL (ML) SUBCUTANEOUS EVERY 6 HOURS
Refills: 0 | Status: DISCONTINUED | OUTPATIENT
Start: 2022-02-06 | End: 2022-02-06

## 2022-02-06 RX ORDER — DEXTROSE 50 % IN WATER 50 %
15 SYRINGE (ML) INTRAVENOUS ONCE
Refills: 0 | Status: DISCONTINUED | OUTPATIENT
Start: 2022-02-06 | End: 2022-02-06

## 2022-02-06 RX ADMIN — SODIUM CHLORIDE 150 MILLILITER(S): 9 INJECTION, SOLUTION INTRAVENOUS at 19:42

## 2022-02-06 RX ADMIN — PANTOPRAZOLE SODIUM 40 MILLIGRAM(S): 20 TABLET, DELAYED RELEASE ORAL at 07:50

## 2022-02-06 RX ADMIN — PIPERACILLIN AND TAZOBACTAM 200 GRAM(S): 4; .5 INJECTION, POWDER, LYOPHILIZED, FOR SOLUTION INTRAVENOUS at 02:35

## 2022-02-06 RX ADMIN — Medication 50 MICROGRAM(S): at 06:52

## 2022-02-06 RX ADMIN — SODIUM ZIRCONIUM CYCLOSILICATE 10 GRAM(S): 10 POWDER, FOR SUSPENSION ORAL at 14:44

## 2022-02-06 RX ADMIN — SODIUM CHLORIDE 1000 MILLILITER(S): 9 INJECTION, SOLUTION INTRAVENOUS at 07:34

## 2022-02-06 RX ADMIN — SODIUM CHLORIDE 100 MILLILITER(S): 9 INJECTION, SOLUTION INTRAVENOUS at 00:01

## 2022-02-06 RX ADMIN — SODIUM ZIRCONIUM CYCLOSILICATE 10 GRAM(S): 10 POWDER, FOR SUSPENSION ORAL at 06:51

## 2022-02-06 RX ADMIN — HEPARIN SODIUM 5000 UNIT(S): 5000 INJECTION INTRAVENOUS; SUBCUTANEOUS at 13:50

## 2022-02-06 RX ADMIN — HEPARIN SODIUM 5000 UNIT(S): 5000 INJECTION INTRAVENOUS; SUBCUTANEOUS at 06:51

## 2022-02-06 RX ADMIN — ALBUTEROL 2.5 MILLIGRAM(S): 90 AEROSOL, METERED ORAL at 02:35

## 2022-02-06 RX ADMIN — Medication 250 MILLILITER(S): at 02:40

## 2022-02-06 RX ADMIN — Medication 4: at 11:36

## 2022-02-06 RX ADMIN — PIPERACILLIN AND TAZOBACTAM 200 GRAM(S): 4; .5 INJECTION, POWDER, LYOPHILIZED, FOR SOLUTION INTRAVENOUS at 10:27

## 2022-02-06 RX ADMIN — Medication 1: at 06:54

## 2022-02-06 RX ADMIN — SODIUM CHLORIDE 150 MILLILITER(S): 9 INJECTION, SOLUTION INTRAVENOUS at 12:26

## 2022-02-06 RX ADMIN — Medication 4: at 16:02

## 2022-02-06 RX ADMIN — HEPARIN SODIUM 5000 UNIT(S): 5000 INJECTION INTRAVENOUS; SUBCUTANEOUS at 22:46

## 2022-02-06 RX ADMIN — INSULIN HUMAN 10 UNIT(S): 100 INJECTION, SOLUTION SUBCUTANEOUS at 02:35

## 2022-02-06 RX ADMIN — CEFTRIAXONE 1000 MILLIGRAM(S): 500 INJECTION, POWDER, FOR SOLUTION INTRAMUSCULAR; INTRAVENOUS at 11:55

## 2022-02-06 RX ADMIN — SODIUM ZIRCONIUM CYCLOSILICATE 10 GRAM(S): 10 POWDER, FOR SUSPENSION ORAL at 22:46

## 2022-02-06 RX ADMIN — DESMOPRESSIN ACETATE 2 MICROGRAM(S): 0.1 TABLET ORAL at 18:25

## 2022-02-06 RX ADMIN — Medication 4: at 22:46

## 2022-02-06 NOTE — H&P ADULT - NSHPSOCIALHISTORY_GEN_ALL_CORE
Tobacco use: used chewing tobacco  EtOH use: prior use  Illicit drug use: prior IVDU  Living situation: from a shelter  Mobility: was discharged to [facility] on 1/30

## 2022-02-06 NOTE — H&P ADULT - NSHPLABSRESULTS_GEN_ALL_CORE
LABS:                         9.7    16.38 )-----------( 204      ( 2022 22:07 )             29.0     02-05    127<L>  |  104  |  69<H>  ----------------------------<  275<H>  7.1<HH>   |  9<LL>  |  4.84<H>    Ca    9.4      2022 22:07  Phos  5.8     02-05  Mg     1.8     -    TPro  7.3  /  Alb  3.9  /  TBili  0.2  /  DBili  x   /  AST  14  /  ALT  15  /  AlkPhos  87  02-05    PT/INR - ( 2022 22:07 )   PT: 12.7 sec;   INR: 1.06     PTT - ( 2022 22:07 )  PTT:25.1 sec    Urinalysis Basic - ( 2022 18:40 )  Color: Yellow / Appearance: Clear / S.020 / pH: x  Gluc: x / Ketone: NEGATIVE  / Bili: NEGATIVE / Urobili: 0.2 E.U./dL   Blood: x / Protein: >=300 mg/dL / Nitrite: NEGATIVE   Leuk Esterase: Large / RBC: > 10 /HPF / WBC Many /HPF   Sq Epi: x / Non Sq Epi: 0-5 /HPF / Bacteria: Many /HPF      Lactate, Blood: 1.6 mmol/L ( @ 01:29)  Lactate, Blood: 2.9 mmol/L ( @ 22:07)  Lactate, Blood: 3.0 mmoL/L ( @ 18:29)      RADIOLOGY, EKG & ADDITIONAL TESTS: Reviewed.     < from: CT Head No Cont (22 @ 19:36) >  IMPRESSION:  No acute intracranial hemorrhage or demarcated transcortical infarction.  < end of copied text >    < from: CT Chest No Cont (22 @ 19:35) >  Impression:  No acute pathology.  Cholelithiasis.  Trace pericardial effusion.  < end of copied text >

## 2022-02-06 NOTE — CONSULT NOTE ADULT - ASSESSMENT
Assessment/Plan:   62M with HTN, DM c/b neuropathy, hypothyroid, BPH, EtOH liver cirrhosis, toxic megacolon c/b pneumatosis s/p colectomy/ileostomy, with multiple recent admissions (7/27-8/1/2021, 1/6-1/19/2022, and 1/24-1/30/2022) for acute renal failure with hyperkalaemia and hyponatraemia- requiring emergent dialysis previously. He presents again with AMS and renal failure but with new finding of movement disorder- tardive dyskinesia, with lip-smacking.     Reccurrent Non Oliguric MEG with unclear aetiology- likely toxic exposure/ingestion; with new neurologic condition  Hyperkalaemia  Mod asymptomatic chr hyponatraemia    Baseline creat- 1-1.2; presenting in high 5s again with improving creat to 3 now  Okay with bicarb infusion- change to saline when bicarb >18  Place on standing lokelma 10g q8h for 48hrs  Na goal at 6pm today is 127-129; repeat BMP at 6pm and notify nephrology of results  Get neurology involvement for better insight into the case  Check bladder scan to r/o obstruction    Renal diet, avoid nephrotoxic meds, renally dose meds, strict I&Os    Thank you for the opportunity to participate in the care of your patient. The nephrology service remains available to assist with any questions or concerns. Please feel free to reach us by paging the on-call nephrology fellow for urgent issues or as below.     Staci Clifton M.D.   PGY-5  Pager: [phone] Assessment/Plan:   62M with HTN, DM c/b neuropathy, hypothyroid, BPH, EtOH liver cirrhosis, toxic megacolon c/b pneumatosis s/p colectomy/ileostomy, with multiple recent admissions (7/27-8/1/2021, 1/6-1/19/2022, and 1/24-1/30/2022) for acute renal failure with hyperkalaemia and hyponatraemia- requiring emergent dialysis previously. He presents again with AMS and renal failure but with new finding of movement disorder- tardive dyskinesia, with lip-smacking.     Reccurrent Non Oliguric MGE with unclear aetiology- likely toxic exposure/ingestion; with new neurologic condition  Hyperkalaemia  Mod asymptomatic chr hyponatraemia    Baseline creat- 1-1.2; presenting in high 5s again with improving creat to 3 now  Okay with bicarb infusion- change to saline when bicarb >18  Place on standing lokelma 10g q8h for 48hrs  Na goal at 6pm today is 127-129; repeat BMP at 6pm and notify nephrology of results  Get neurology involvement for better insight into the case  Check bladder scan to r/o obstruction    Anaemia- check iron panel and ferritin    Renal diet, avoid nephrotoxic meds, renally dose meds, strict I&Os    Thank you for the opportunity to participate in the care of your patient. The nephrology service remains available to assist with any questions or concerns. Please feel free to reach us by paging the on-call nephrology fellow for urgent issues or as below.     Staci Clifton M.D.   PGY-5  Pager: [phone]

## 2022-02-06 NOTE — H&P ADULT - NSICDXPASTMEDICALHX_GEN_ALL_CORE_FT
PAST MEDICAL HISTORY:  Anemia     BPH (benign prostatic hyperplasia)     DM (diabetes mellitus)     ETOH abuse     HLD (hyperlipidemia)     HTN (hypertension)     Hypothyroidism

## 2022-02-06 NOTE — PROGRESS NOTE ADULT - SUBJECTIVE AND OBJECTIVE BOX
OVERNIGHT/INTERVAL EVENTS: Potassium downtrending, 5.7 this AM s/p hyperkalemia cocktail. Making good UO. Normothermic and HDS today. Patient A&Ox2-3, waxing and waning, continues to have lip smacking while awake.    SUBJECTIVE HPI: Patient seen and examined at bedside. Patient resting comfortably, no complaints at this time. Patient denies: fever, chills, weakness, dizziness, headaches, changes in vision, chest pain, palpitations, shortness of breath, cough, N/V, diarrhea or constipation, dysuria, LE edema. ROS otherwise negative.    VITAL SIGNS:  Vital Signs Last 24 Hrs  T(C): 36.7 (2022 09:00), Max: 36.7 (2022 09:00)  T(F): 98.1 (2022 09:00), Max: 98.1 (2022 09:00)  HR: 61 (2022 11:00) (41 - 74)  BP: 136/73 (2022 11:00) (101/53 - 173/73)  BP(mean): 98 (2022 11:00) (70 - 103)  RR: 26 (2022 11:00) (6 - 29)  SpO2: 97% (2022 11:00) (97% - 100%)      22 @ 07:01  -  22 @ 07:00  --------------------------------------------------------  IN: 1250 mL / OUT: 1385 mL / NET: -135 mL    22 @ 07:01  -  22 @ 13:17  --------------------------------------------------------  IN: 450 mL / OUT: 920 mL / NET: -470 mL      PHYSICAL EXAM:   General: Calm, comfortable  Neurological: A&Ox2-3 (pt forgets year, w/intermittent confusion), lip smacking and right sided shoulder shrugging while awake, no focal deficits  HEENT: EOMI, PERRL, clear conjunctiva, no nasal or oropharyngeal discharge or exudates, MMM  Neck: supple, no cervical or post-auricular lymphadenopathy, no JVD  Cardiovascular: +S1/S2, no murmurs/rubs/gallops, RRR  Respiratory: CTA B/L, no diminished breath sounds, no wheezes/rales/rhonchi, no increased work of breathing or accessory muscle use  Gastrointestinal: soft, NT/ND; active BSx4 quadrants  Genitourinary: no suprapubic tenderness, no CVA tenderness  Extremities: WWP; no edema, clubbing or cyanosis  Vascular: 2+ radial, DP/PT pulses B/L  Skin: no rashes  Lines/Drains: [place]    MEDICATIONS:  MEDICATIONS  (STANDING):  cefTRIAXone Injectable. 1000 milliGRAM(s) IV Push every 24 hours  dextrose 40% Gel 15 Gram(s) Oral once  dextrose 5% 1000 milliLiter(s) (150 mL/Hr) IV Continuous <Continuous>  dextrose 5%. 1000 milliLiter(s) (100 mL/Hr) IV Continuous <Continuous>  dextrose 5%. 1000 milliLiter(s) (50 mL/Hr) IV Continuous <Continuous>  dextrose 50% Injectable 25 Gram(s) IV Push once  dextrose 50% Injectable 12.5 Gram(s) IV Push once  dextrose 50% Injectable 25 Gram(s) IV Push once  glucagon  Injectable 1 milliGRAM(s) IntraMuscular once  glucagon  Injectable 1 milliGRAM(s) IntraMuscular once  heparin   Injectable 5000 Unit(s) SubCutaneous every 8 hours  insulin lispro (ADMELOG) corrective regimen sliding scale   SubCutaneous Before meals and at bedtime  levothyroxine 50 MICROGram(s) Oral daily  pantoprazole    Tablet 40 milliGRAM(s) Oral before breakfast  sodium zirconium cyclosilicate 10 Gram(s) Oral every 8 hours    MEDICATIONS  (PRN):      ALLERGIES/INTOLERANCES:  Allergies    No Known Allergies    Intolerances        LABS:                        9.0    8.11  )-----------( 178      ( 2022 05:50 )             27.5     02-06    126<L>  |  98  |  50<H>  ----------------------------<  270<H>  5.8<H>   |  16<L>  |  3.09<H>    Ca    9.8      2022 11:21  Phos  3.7     02-06  Mg     1.7     02-06    TPro  6.9  /  Alb  4.0  /  TBili  0.2  /  DBili  x   /  AST  14  /  ALT  13  /  AlkPhos  79  02-06    PT/INR - ( 2022 22:07 )   PT: 12.7 sec;   INR: 1.06          PTT - ( 2022 22:07 )  PTT:25.1 sec  CAPILLARY BLOOD GLUCOSE      POCT Blood Glucose.: 247 mg/dL (2022 11:34)            Urinalysis Basic - ( 2022 18:40 )    Color: Yellow / Appearance: Clear / S.020 / pH: x  Gluc: x / Ketone: NEGATIVE  / Bili: NEGATIVE / Urobili: 0.2 E.U./dL   Blood: x / Protein: >=300 mg/dL / Nitrite: NEGATIVE   Leuk Esterase: Large / RBC: > 10 /HPF / WBC Many /HPF   Sq Epi: x / Non Sq Epi: 0-5 /HPF / Bacteria: Many /HPF        ABG - ( 2022 01:30 )  pH, Arterial: 7.21  pH, Blood: x     /  pCO2: 33    /  pO2: 166   / HCO3: 13    / Base Excess: -13.6 /  SaO2: 99.2                Microbiology:      RADIOLOGY, EKG AND ADDITIONAL TESTS: Reviewed.

## 2022-02-06 NOTE — H&P ADULT - ASSESSMENT
62M with HTN, DM c/b neuropathy, hypothyroid, BPH, EtOH liver cirrhosis, toxic megacolon c/b pneumatosis s/p colectomy/ileostomy, with multiple recent admissions (7/27-8/1/2021, 1/6-1/19/2022, and 1/24-1/30/2022) for acute renal failure and hyperkalemia, the last 2 times requiring emergent dialysis, presenting from shelter for altered mental status x1 day. Admitted to MICU for hyperkalemia with EKG changes.      NEUROLOGIC  # Metabolic encephalopathy  Presented with altered mental status x 1 day. CT head wnl. Utox negative. Alcohol and ammonia levels wnl. Likely 2/2 sepsis from UTI.      CARDIOLOGY  # Hypertension  -home meds (from discharge 1/19): hydrALAZINE 50mg q8hrs, norvasc 10mg qd  -/68 on admission  Plan:    -holding home antihypertensives in the setting of normotensive pressures and getting HD    -monitor BP and restart home antihypertensives as tolerated by blood pressure    # HLD  -home meds (from discharge 1/19): atorvastatin 10mg  Plan:    -c/w atorvastatin 10mg qhs during admission      RESPIRATORY  -no acute issues      GASTROINTESTINAL  # Ostomy  -with ostomy due to hx of toxic megacolon. output draining brown stool.    ID  # Sepsis  # UTI    ENDOCRINE  # DM  -home meds (from discharge 1/19): Janumet 50 mg-500 mg BID. on gabapentin 300mg TID for peripheral neuropathy  Plan:    -holding oral diabetes medications    -monitor FSG and manage hyperglycemia with moderate ISS. based on insulin requirements will consider basal-bolus regimen    -gabapentin requires dose adjustment for dialysis - will give 100mg after HD sessions    # Hypothyroidism  -home meds (from discharge 1/19): synthroid 50mcg qd  Plan:    -c/w home synthroid 50mcg qd      RENAL & GENITOURINARY  # Acute renal failure  -multiple admissions for acute renal failure with hyperkalemia. 1/6-1/19/2022 admission to ICU for altered mental status 2/2 renal failure requiring HD. HD catheter had been removed when pt recovered urinary output, thought to have recovered renal function sufficiently to no longer require HD as outpatient. Also with admission 07/27/2021-08/1/2021 for MEG c/b hyperkalemia, responsive to medical therapy and not requiring hemodialysis.  -on ferrous sulfate tabs at home  -On admission, K 7.6, HCO3 8, BUN 87, Cr 4.39. EKG with peaked T waves. no evidence of fluid overload on exam.  -renal consulted upon arrival to ICU. R IJ HD cath placed at bedside and 1st episode of HD overnight.  Plan:    -avoid nephrotoxic medications and renally dose medications    -appreciate renal recommendations    -monitor renal function with BMP    -strict I/O    # BPH  -home meds (from discharge 1/19): tamsulosin 0.4 mg qhs  Plan:    -c/w tamsulosin 0.4mg qhs      MISC  F: encourage PO intake  E: caution with repletion  N: renal diet, consistent carb, dash  DVT ppx: heparin subq  GI ppx: home PPI    Code Status: full code    Dispo: MICU 62M with HTN, DM c/b neuropathy, hypothyroid, BPH, EtOH liver cirrhosis, toxic megacolon c/b pneumatosis s/p colectomy/ileostomy, with multiple recent admissions (7/27-8/1/2021, 1/6-1/19/2022, and 1/24-1/30/2022) for acute renal failure and hyperkalemia, the last 2 times requiring emergent dialysis, presenting from shelter for altered mental status x1 day. Admitted to MICU for hyperkalemia with EKG changes.      NEUROLOGIC  # Metabolic encephalopathy  Presented with altered mental status x 1 day. CT head wnl. Utox negative. Alcohol and ammonia levels wnl. Likely 2/2 sepsis from UTI.  - Management of sepsis as below    # Tardive dyskinesia  Exam with intermittent involuntary lip-smacking and shoulder shrugging. Reportedly improved s/p Versed 2mg in the ED. Med rec without any antipsychotics.  - Obtain further collateral in the AM      CARDIOLOGY  # Bradycardia - improved  Junctional bradycardia to 40s on admission, likely 2/2 hyperkalemia. Improved with treatment of hyperkalemia.  - Continue to monitor    # Hypertension  -home meds: hydrALAZINE 50mg q8hrs, norvasc 10mg qd  -Normotensive on admission  Plan:    -holding home antihypertensives in the setting of normotensive pressures    -monitor BP and restart home antihypertensives as tolerated by blood pressure    # HLD  -home meds: atorvastatin 10mg  Plan:    -c/w atorvastatin 10mg qhs during admission      RESPIRATORY  # Respiratory acidosis  Winter's formula for metabolic acidosis reveals concurrent respiratory acidosis. Possibly 2/2 benzo given in the ED for tardive dyskinesia.  - Avoid further sedatives at this time  - Holding home Trazodone 100mg daily and Duloxetine 30mg daily      GASTROINTESTINAL  # Ostomy  -with ostomy due to hx of toxic megacolon. output draining brown stool.      RENAL & GENITOURINARY  # Acute renal failure w/ hyperkalemia  -Multiple recent admissions for acute renal failure with hyperkalemia, the last 2 times requiring emergent dialysis. Currently oliguric however responding to medical management of hyperkalemia overnight. Acidosis improving. CT abd/pelvis without obvious hydronephrosis or stone. Presenting this admission with sepsis 2/2 UTI.  Plan:    -Trend BMP, Mg, Phos q6hr, repeat hyperkalemic treatment as needed    -Ordered Lokelma 10g q8hr x 24hrs    -Monitor bicarb while on bicarb gtt    -avoid nephrotoxic medications and renally dose medications    -monitor renal function with BMP    -strict I/O    -consider Nephro vs Urology consult in AM due to multiple recent admissions with the same presentation    # Hyponatremia  -Na 121 on admission, increased to 127 after 2.5L NS bolus in the ED. On D5 overnight for with bicarb gtt.  Plan:    -Holding home Duloxetine, obtain collateral in AM if pt was still taking it from discharge last week    -Goal Na no greater than 129 before 2/6 at 6pm    -If Na is above goal, consider D5 bolus and/or DDAVP    #Non-anion gap metabolic acidosis  Possibly due to renal dysfunction vs GI losses from colostomy. Improving on bicarb gtt.  -Monitor pH    # BPH  -home meds: tamsulosin 0.4 mg qhs  Plan:    -c/w tamsulosin 0.4mg qhs      ID  # Sepsis  Hypothermic with WBC 22 on admission. Lactate 3 on admission now resolved. CT head, chest and abd/pelvis wnl. UA positive. Purulent urine in [place]. Severe sepsis likely 2/2 UTI.  - F/u blood cultures    # UTI  - C/w Zosyn 2.25g q8hr   - F/u urine cultures, deescalate w/ sensitivities      ENDOCRINE  # DM  -home meds: Janumet 50 mg-500 mg BID. Gabapentin for peripheral neuropathy discontinued last admission.  Plan:    -holding oral diabetes medications    -monitor FSG and manage hyperglycemia with moderate ISS. based on insulin requirements will consider basal-bolus regimen    # Hypothyroidism  -home meds: synthroid 50mcg qd  Plan:    -c/w home synthroid 50mcg qd      MISC  F: encourage PO intake  E: caution with repletion  N: Soft diet  DVT ppx: heparin subq  GI ppx: home PPI    Code Status: full code    Dispo: MICU 62M with HTN, DM c/b neuropathy, hypothyroid, BPH, EtOH liver cirrhosis, toxic megacolon c/b pneumatosis s/p colectomy/ileostomy, with multiple recent admissions (7/27-8/1/2021, 1/6-1/19/2022, and 1/24-1/30/2022) for acute renal failure and hyperkalemia, the last 2 times requiring emergent dialysis, presenting from shelter for altered mental status x1 day. Admitted to MICU for hyperkalemia with EKG changes.      NEUROLOGIC  # Metabolic encephalopathy  Presented with altered mental status x 1 day. CT head wnl. Utox negative. Alcohol and ammonia levels wnl. Likely 2/2 sepsis from UTI.  - Management of sepsis as below    # Tardive dyskinesia  Exam with intermittent involuntary lip-smacking and shoulder shrugging. Reportedly improved s/p Versed 2mg in the ED. Med rec without any antipsychotics.  - Obtain further collateral in the AM      CARDIOLOGY  # Bradycardia - improved  Junctional bradycardia to 40s on admission, likely 2/2 hyperkalemia. Improved with treatment of hyperkalemia.  - Continue to monitor    # Hypertension  -home meds: hydrALAZINE 50mg q8hrs, norvasc 10mg qd  -Normotensive on admission  Plan:    -holding home antihypertensives in the setting of normotensive pressures    -monitor BP and restart home antihypertensives as tolerated by blood pressure    # HLD  -home meds: atorvastatin 10mg  Plan:    -c/w atorvastatin 10mg qhs during admission      RESPIRATORY  # Respiratory acidosis  Winter's formula for metabolic acidosis reveals concurrent respiratory acidosis. Possibly 2/2 benzo given in the ED for tardive dyskinesia.  - Avoid further sedatives at this time  - Holding home Trazodone 100mg daily and Duloxetine 30mg daily      GASTROINTESTINAL  # Cirrhosis  Patient with history of alcohol overuse and cirrhosis.  Currently not decompensated. Reports that last drink was 10 years ago. Ammonia level on admission wnl.  - No acute interventions.    # Ostomy  -with ostomy due to hx of toxic megacolon. output draining brown stool.      RENAL & GENITOURINARY  # Acute renal failure w/ hyperkalemia  -Multiple recent admissions for acute renal failure with hyperkalemia, the last 2 times requiring emergent dialysis. Currently oliguric however responding to medical management of hyperkalemia overnight. Acidosis improving. CT abd/pelvis without obvious hydronephrosis or stone. Presenting this admission with sepsis 2/2 UTI.  Plan:    -Trend BMP, Mg, Phos q6hr, repeat hyperkalemic treatment as needed    -Ordered Lokelma 10g q8hr x 24hrs    -Monitor bicarb while on bicarb gtt    -avoid nephrotoxic medications and renally dose medications    -monitor renal function with BMP    -strict I/O    -consider Nephro vs Urology consult in AM due to multiple recent admissions with the same presentation    # Hyponatremia  -Na 121 on admission, increased to 127 after 2.5L NS bolus in the ED. On D5 overnight for with bicarb gtt.  Plan:    -Holding home Duloxetine, obtain collateral in AM if pt was still taking it from discharge last week    -Goal Na no greater than 129 before 2/6 at 6pm    -If Na is above goal, consider D5 bolus and/or DDAVP    #Non-anion gap metabolic acidosis  Possibly due to renal dysfunction vs GI losses from colostomy. Improving on bicarb gtt.  -Monitor pH    # BPH  -home meds: tamsulosin 0.4 mg qhs  Plan:    -c/w tamsulosin 0.4mg qhs      ID  # Sepsis  Hypothermic with WBC 22 on admission. Lactate 3 on admission now resolved. CT head, chest and abd/pelvis wnl. UA positive. Purulent urine in [place]. Severe sepsis likely 2/2 UTI.  - F/u blood cultures    # UTI  - C/w Zosyn 2.25g q8hr   - F/u urine cultures, deescalate w/ sensitivities      ENDOCRINE  # DM  -home meds: Janumet 50 mg-500 mg BID. Gabapentin for peripheral neuropathy discontinued last admission.  Plan:    -holding oral diabetes medications    -monitor FSG and manage hyperglycemia with moderate ISS. based on insulin requirements will consider basal-bolus regimen    # Hypothyroidism  -home meds: synthroid 50mcg qd  Plan:    -c/w home synthroid 50mcg qd      MISC  F: encourage PO intake  E: caution with repletion  N: Soft diet  DVT ppx: heparin subq  GI ppx: home PPI    Code Status: full code    Dispo: MICU

## 2022-02-06 NOTE — CONSULT NOTE ADULT - SUBJECTIVE AND OBJECTIVE BOX
Patient is a 62y old  Male who presents with a chief complaint of Altered Mental Status, Hyperkalemia (2022 08:30)    HPI:  62M with HTN, DM c/b neuropathy, hypothyroid, BPH, EtOH liver cirrhosis, toxic megacolon c/b pneumatosis s/p colectomy/ileostomy, with multiple recent admissions (-2021, -2022, and -2022) for acute renal failure with hyperkalaemia and hyponatraemia- requiring emergent dialysis previously. He presents again with AMS and renal failure but with new finding of movement disorder- tardive dyskinesia, with lipsmacking. Pt denies any OTC meds, meds from friends, illicit drugs, toxic work exposures, herbal drinks/teas. Unable to state where he lives or how long he has had the movement disorders. UTox negative. No clear aetiology for his recurrent episodes at this time. Consult neurology as multidiscp approach needed to investigate cause of pts repeat illnesses/admissions.    PAST MEDICAL & SURGICAL HISTORY:  DM (diabetes mellitus)    HTN (hypertension)    HLD (hyperlipidemia)    ETOH abuse    Anemia    Hypothyroidism    BPH (benign prostatic hyperplasia)    History of colectomy    History of ileostomy        Allergies:  No Known Allergies      Home Medications:   cefTRIAXone Injectable. 1000 milliGRAM(s) IV Push every 24 hours  dextrose 40% Gel 15 Gram(s) Oral once  dextrose 5% 1000 milliLiter(s) IV Continuous <Continuous>  dextrose 5%. 1000 milliLiter(s) IV Continuous <Continuous>  dextrose 5%. 1000 milliLiter(s) IV Continuous <Continuous>  dextrose 50% Injectable 25 Gram(s) IV Push once  dextrose 50% Injectable 12.5 Gram(s) IV Push once  dextrose 50% Injectable 25 Gram(s) IV Push once  glucagon  Injectable 1 milliGRAM(s) IntraMuscular once  glucagon  Injectable 1 milliGRAM(s) IntraMuscular once  heparin   Injectable 5000 Unit(s) SubCutaneous every 8 hours  insulin lispro (ADMELOG) corrective regimen sliding scale   SubCutaneous Before meals and at bedtime  levothyroxine 50 MICROGram(s) Oral daily  pantoprazole    Tablet 40 milliGRAM(s) Oral before breakfast  sodium zirconium cyclosilicate 10 Gram(s) Oral every 8 hours      Hospital Medications:   MEDICATIONS  (STANDING):  cefTRIAXone Injectable. 1000 milliGRAM(s) IV Push every 24 hours  dextrose 40% Gel 15 Gram(s) Oral once  dextrose 5% 1000 milliLiter(s) (150 mL/Hr) IV Continuous <Continuous>  dextrose 5%. 1000 milliLiter(s) (100 mL/Hr) IV Continuous <Continuous>  dextrose 5%. 1000 milliLiter(s) (50 mL/Hr) IV Continuous <Continuous>  dextrose 50% Injectable 25 Gram(s) IV Push once  dextrose 50% Injectable 12.5 Gram(s) IV Push once  dextrose 50% Injectable 25 Gram(s) IV Push once  glucagon  Injectable 1 milliGRAM(s) IntraMuscular once  glucagon  Injectable 1 milliGRAM(s) IntraMuscular once  heparin   Injectable 5000 Unit(s) SubCutaneous every 8 hours  insulin lispro (ADMELOG) corrective regimen sliding scale   SubCutaneous Before meals and at bedtime  levothyroxine 50 MICROGram(s) Oral daily  pantoprazole    Tablet 40 milliGRAM(s) Oral before breakfast  sodium zirconium cyclosilicate 10 Gram(s) Oral every 8 hours      Family History:  FAMILY HISTORY:      ROS:  RESPIRATORY: No shortness of breath  CARDIOVASCULAR: No Chest pain  MUSCULOSKELETAL: No leg swelling  All other ROS negative    VITALS:  T(F): 98.1 (22 @ 09:00), Max: 98.1 (22 @ 09:00)  HR: 61 (22 @ 11:00)  BP: 136/73 (22 @ 11:00)  RR: 26 (22 @ 11:00)  SpO2: 97% (22 @ 11:00)  Wt(kg): --     @ 07:01  -   @ 07:00  --------------------------------------------------------  IN: 1250 mL / OUT: 1385 mL / NET: -135 mL     @ 07:01  -   @ 12:57  --------------------------------------------------------  IN: 450 mL / OUT: 920 mL / NET: -470 mL      Height (cm): 167.6 ( @ 19:09)  Weight (kg): 68.5 ( @ 22:13)  BMI (kg/m2): 24.4 ( @ 22:13)  BSA (m2): 1.77 ( @ 22:13)  CAPILLARY BLOOD GLUCOSE      POCT Blood Glucose.: 247 mg/dL (2022 11:34)  POCT Blood Glucose.: 292 mg/dL (2022 10:23)  POCT Blood Glucose.: 192 mg/dL (2022 06:30)  POCT Blood Glucose.: 132 mg/dL (2022 01:57)  POCT Blood Glucose.: 255 mg/dL (2022 19:49)      PHYSICAL EXAM:  GENERAL: Awake, communicative, +new tremours and lipsmacking, +confused  CHEST/LUNG: Bilateral clear breath sounds, no rales  HEART: Regular rate and rhythm, no murmur  ABDOMEN: Soft, nontender, non distended  EXTREMITIES: No pedal oedema    LABS:      126<L>  |  98  |  50<H>  ----------------------------<  270<H>  5.8<H>   |  16<L>  |  3.09<H>    Ca    9.8      2022 11:21  Phos  3.7     -  Mg     1.7         TPro  6.9  /  Alb  4.0  /  TBili  0.2  /  DBili      /  AST  14  /  ALT  13  /  AlkPhos  79  -    Creatinine Trend: 3.09 <--, 4.01 <--, 4.84 <--, 5.88 <--, 1.26 <--                        9.0    8.11  )-----------( 178      ( 2022 05:50 )             27.5       Urine Studies:  Urinalysis Basic - ( 2022 18:40 )    Color: Yellow / Appearance: Clear / S.020 / pH:   Gluc:  / Ketone: NEGATIVE  / Bili: NEGATIVE / Urobili: 0.2 E.U./dL   Blood:  / Protein: >=300 mg/dL / Nitrite: NEGATIVE   Leuk Esterase: Large / RBC: > 10 /HPF / WBC Many /HPF   Sq Epi:  / Non Sq Epi: 0-5 /HPF / Bacteria: Many /HPF      Sodium, Random Urine: 38 mmol/L ( @ 09:15)  Creatinine, Random Urine: 28 mg/dL ( 09:15)  Osmolality, Random Urine: 226 mosm/kg ( @ 09:15)        22 @ 07:01  -  22 @ 07:00  --------------------------------------------------------  IN: 1250 mL / OUT: 1385 mL / NET: -135 mL    22 @ 07:01  -  22 @ 12:57  --------------------------------------------------------  IN: 450 mL / OUT: 920 mL / NET: -470 mL

## 2022-02-06 NOTE — H&P ADULT - HISTORY OF PRESENT ILLNESS
INCOMPLETE    62M with HTN, DM c/b neuropathy, hypothyroid, BPH, EtOH liver cirrhosis, toxic megacolon c/b pneumatosis s/p colectomy/ileostomy, with multiple recent admissions (7/27-8/1/2021, 1/6-1/19/2022, and 1/24-1/30/2022) for acute renal failure and hyperkalemia, the last 2 times requiring emergent dialysis, presenting from shelter for altered mental status x1 day.    At [facility],  Vitals: T 94.3F, HR 46, /73, RR 20, O2sat 100% on NRB  Labs: WBC 22.13, Na 121, Cl 95, K 8.1, HCO3 10, BUN 73, Cr 5.88, glucose 342, lactate 3.0  Imaging:    EKG: left axis deviation, sinus bradycardia at HR 43, peaked T waves    CT head: wnl, CT chest/abd/pelvis: no acute pathology, cholelithiasis, trace pericardial effusion  ED Course: given D5+insulin, bicarb, albuterol, calcium gluconate, versed, vanc/zosyn. Transferred to [facility] ICU for possible emergent dialysis. 62M with HTN, DM c/b neuropathy, hypothyroid, BPH, EtOH liver cirrhosis, toxic megacolon c/b pneumatosis s/p colectomy/ileostomy, with multiple recent admissions (7/27-8/1/2021, 1/6-1/19/2022, and 1/24-1/30/2022) for acute renal failure and hyperkalemia, the last 2 times requiring emergent dialysis, presenting from shelter for altered mental status x1 day. At time of interview, pt was somnolent but A&Ox3 to self, hospital and year, and is generally slow to respond to questions. When asked what brought him to the hospital, pt verbalized he had pain in both legs, of unclear duration or quality, and was unable to provide further details.    At [facility],  Vitals: T 94.3F, HR 46, /73, RR 20, O2sat 100% on NRB  Labs: WBC 22.13, Na 121, Cl 95, K 8.1, HCO3 10, BUN 73, Cr 5.88, glucose 342, lactate 3.0  Imaging:    EKG: left axis deviation, sinus bradycardia at HR 43, peaked T waves    CT head: wnl, CT chest/abd/pelvis: no acute pathology, cholelithiasis, trace pericardial effusion  ED Course: given D5+insulin, bicarb, albuterol, calcium gluconate, versed, vanc/zosyn. Transferred to [facility] ICU for possible emergent dialysis.

## 2022-02-06 NOTE — H&P ADULT - NSHPPHYSICALEXAM_GEN_ALL_CORE
VITAL SIGNS:  T(C): 36.4 (02-06-22 @ 01:41), Max: 36.4 (02-05-22 @ 22:36)  T(F): 97.5 (02-06-22 @ 01:41), Max: 97.6 (02-05-22 @ 22:36)  HR: 58 (02-06-22 @ 01:00) (41 - 74)  BP: 114/56 (02-06-22 @ 01:00) (102/49 - 173/73)  BP(mean): 81 (02-06-22 @ 01:00) (70 - 81)  RR: 7 (02-06-22 @ 01:00) (7 - 23)  SpO2: 100% (02-06-22 @ 01:00) (100% - 100%)  Wt(kg): --    PHYSICAL EXAM:  Constitutional: Thin middle aged man resting comfortably in bed; NAD  Head: NC/AT  Eyes: PERRL, EOMI, anicteric sclera  ENT: no nasal discharge; uvula midline, no oropharyngeal erythema or exudates; dry MM  Neck: supple; no JVD or thyromegaly  Respiratory: CTA B/L; no W/R/R, no retractions  Cardiac: +S1/S2; bradycardic to 40, regular rhythm; no M/R/G; PMI non-displaced  Gastrointestinal: abdomen soft, NT/ND; no rebound or guarding; +BSx4, ostomy bag with small amount of green stool  Back: spine midline, no bony tenderness or step-offs; no CVAT B/L  Extremities: WWP, no clubbing or cyanosis; no peripheral edema  Musculoskeletal: NROM x4; no joint swelling, tenderness or erythema  Vascular: 2+ radial, DP/PT pulses B/L  Dermatologic: skin warm, dry and intact; no rashes, wounds, or scars  Lymphatic: no submandibular or cervical LAD  Neurologic: AAOx3 to self, hospital and year however unsure of exact date; CNII-XII grossly intact; no focal deficits, occasional lip-smacking and shoulder shrugging  Psychiatric: somnolent unable to assess

## 2022-02-06 NOTE — PROGRESS NOTE ADULT - ASSESSMENT
62M with HTN, DM c/b neuropathy, hypothyroid, BPH, EtOH liver cirrhosis, toxic megacolon c/b pneumatosis s/p colectomy/ileostomy, with multiple recent admissions (7/27-8/1/2021, 1/6-1/19/2022, and 1/24-1/30/2022) for acute renal failure and hyperkalemia, the last 2 times requiring emergent dialysis, presenting from shelter for altered mental status x1 day. Admitted to MICU for hyperkalemia with EKG changes.      NEUROLOGIC  # Metabolic encephalopathy  Presented with altered mental status x 1 day. CT head wnl. Utox negative. Alcohol and ammonia levels wnl. Likely 2/2 sepsis from UTI.  - Management of sepsis as below    # Tardive dyskinesia  Exam with intermittent involuntary lip-smacking and shoulder shrugging. Reportedly improved s/p Versed 2mg in the ED. Med rec without any antipsychotics.  - Obtain further collateral in the AM      CARDIOLOGY  # Bradycardia - improved  Junctional bradycardia to 40s on admission, likely 2/2 hyperkalemia. Improved with treatment of hyperkalemia.  - Continue to monitor    # Hypertension  -home meds: hydrALAZINE 50mg q8hrs, norvasc 10mg qd  -Normotensive on admission  Plan:    -holding home antihypertensives in the setting of normotensive pressures    -monitor BP and restart home antihypertensives as tolerated by blood pressure    # HLD  -home meds: atorvastatin 10mg  Plan:    -c/w atorvastatin 10mg qhs during admission      RESPIRATORY  # Respiratory acidosis  Winter's formula for metabolic acidosis reveals concurrent respiratory acidosis. Possibly 2/2 benzo given in the ED for tardive dyskinesia.  - Avoid further sedatives at this time  - Holding home Trazodone 100mg daily and Duloxetine 30mg daily      GASTROINTESTINAL  # Cirrhosis  Patient with history of alcohol overuse and cirrhosis.  Currently not decompensated. Reports that last drink was 10 years ago. Ammonia level on admission wnl.  - No acute interventions.    # Ostomy  -with ostomy due to hx of toxic megacolon. output draining brown stool.      RENAL & GENITOURINARY  # Acute renal failure w/ hyperkalemia  -Multiple recent admissions for acute renal failure with hyperkalemia, the last 2 times requiring emergent dialysis. Currently oliguric however responding to medical management of hyperkalemia overnight. Acidosis improving. CT abd/pelvis without obvious hydronephrosis or stone. Presenting this admission with sepsis 2/2 UTI.  Plan:    -Trend BMP, Mg, Phos q6hr, repeat hyperkalemic treatment as needed    -Ordered Lokelma 10g q8hr x 24hrs    -Monitor bicarb while on bicarb gtt    -avoid nephrotoxic medications and renally dose medications    -monitor renal function with BMP    -strict I/O    -consider Nephro vs Urology consult in AM due to multiple recent admissions with the same presentation    # Hyponatremia  -Na 121 on admission, increased to 127 after 2.5L NS bolus in the ED. On D5 overnight for with bicarb gtt.  Plan:    -Holding home Duloxetine, obtain collateral in AM if pt was still taking it from discharge last week    -Goal Na no greater than 129 before 2/6 at 6pm    -If Na is above goal, consider D5 bolus and/or DDAVP    #Non-anion gap metabolic acidosis  Possibly due to renal dysfunction vs GI losses from colostomy. Improving on bicarb gtt.  -Monitor pH    # BPH  -home meds: tamsulosin 0.4 mg qhs  Plan:    -c/w tamsulosin 0.4mg qhs      ID  # Sepsis  Hypothermic with WBC 22 on admission. Lactate 3 on admission now resolved. CT head, chest and abd/pelvis wnl. UA positive. Purulent urine in [place]. Severe sepsis likely 2/2 UTI.  - F/u blood cultures    # UTI  - C/w Zosyn 2.25g q8hr   - F/u urine cultures, deescalate w/ sensitivities      ENDOCRINE  # DM  -home meds: Janumet 50 mg-500 mg BID. Gabapentin for peripheral neuropathy discontinued last admission.  Plan:    -holding oral diabetes medications    -monitor FSG and manage hyperglycemia with moderate ISS. based on insulin requirements will consider basal-bolus regimen    # Hypothyroidism  -home meds: synthroid 50mcg qd  Plan:    -c/w home synthroid 50mcg qd      MISC  F: encourage PO intake  E: caution with repletion  N: Soft diet  DVT ppx: heparin subq  GI ppx: home PPI    Code Status: full code    Dispo: MICU 62M with HTN, DM c/b neuropathy, hypothyroid, BPH, EtOH liver cirrhosis, toxic megacolon c/b pneumatosis s/p colectomy/ileostomy, with multiple recent admissions (7/27-8/1/2021, 1/6-1/19/2022, and 1/24-1/30/2022) for acute renal failure and hyperkalemia, the last 2 times requiring emergent dialysis, presenting from shelter for altered mental status x1 day. Admitted to MICU for hyperkalemia with EKG changes.      NEUROLOGIC  # Metabolic encephalopathy  Presented with altered mental status x 1 day. CT head wnl. Utox negative. Alcohol and ammonia levels wnl. Likely 2/2 sepsis from UTI.  - Management of sepsis as below    # Tardive dyskinesia  Exam with intermittent involuntary lip-smacking and shoulder shrugging. Reportedly improved s/p Versed 2mg in the ED. Med rec without any antipsychotics.  - Obtain further collateral in the AM      CARDIOLOGY  # Bradycardia - improved  Junctional bradycardia to 40s on admission, likely 2/2 hyperkalemia. Improved with treatment of hyperkalemia.  - Continue to monitor    # Hypertension  -home meds: hydrALAZINE 50mg q8hrs, norvasc 10mg qd  -Normotensive on admission  Plan:    -holding home antihypertensives in the setting of normotensive pressures    -monitor BP and restart home antihypertensives as tolerated by blood pressure    # HLD  -home meds: atorvastatin 10mg  Plan:    -c/w atorvastatin 10mg qhs during admission      RESPIRATORY  # Respiratory acidosis  Winter's formula for metabolic acidosis reveals concurrent respiratory acidosis. Possibly 2/2 benzo given in the ED for tardive dyskinesia.  -Avoid further sedatives at this time  -Hold home Trazodone 100mg daily and Duloxetine 30mg daily      GASTROINTESTINAL  # Cirrhosis  Patient with history of alcohol overuse and cirrhosis.  Currently not decompensated. Reports that last drink was 10 years ago. Ammonia level on admission wnl.  -No acute interventions.    # Ostomy  -with ostomy due to hx of toxic megacolon. output draining brown stool.      RENAL & GENITOURINARY  # Acute renal failure w/ hyperkalemia  -Multiple recent admissions for acute renal failure with hyperkalemia, the last 2 times requiring emergent dialysis. Currently oliguric however responding to medical management of hyperkalemia overnight. Acidosis improving. CT abd/pelvis without obvious hydronephrosis or stone. Presenting this admission with sepsis 2/2 UTI.  Plan:    -Trend BMP, Mg, Phos q6hr, repeat hyperkalemic treatment as needed    -Ordered Lokelma 10g q8hr x 24hrs    -Monitor bicarb while on bicarb gtt    -avoid nephrotoxic medications and renally dose medications    -monitor renal function with BMP    -strict I/O    -consider Nephro vs Urology consult in AM due to multiple recent admissions with the same presentation    # Hyponatremia  -Na 121 on admission, increased to 127 after 2.5L NS bolus in the ED. On D5 overnight for with bicarb gtt. Na 1  -Hold home Duloxetine  -    -Goal Na no greater than 129 before 2/6 at 6pm    -If Na is above goal, consider D5 bolus and/or DDAVP    #Non-anion gap metabolic acidosis  Possibly due to renal dysfunction vs GI losses from colostomy. Improving on bicarb gtt.  -Monitor pH    # BPH  -home meds: tamsulosin 0.4 mg qhs  Plan:    -c/w tamsulosin 0.4mg qhs      ID  # Sepsis  Hypothermic with WBC 22 on admission. Lactate 3 on admission now resolved. CT head, chest and abd/pelvis wnl. UA positive. Purulent urine in [place]. Severe sepsis likely 2/2 UTI.  - F/u blood cultures    # UTI  - C/w Zosyn 2.25g q8hr   - F/u urine cultures, deescalate w/ sensitivities      ENDOCRINE  # DM  -home meds: Janumet 50 mg-500 mg BID. Gabapentin for peripheral neuropathy discontinued last admission.  Plan:    -holding oral diabetes medications    -monitor FSG and manage hyperglycemia with moderate ISS. based on insulin requirements will consider basal-bolus regimen    # Hypothyroidism  -home meds: synthroid 50mcg qd  Plan:    -c/w home synthroid 50mcg qd      MISC  F: encourage PO intake  E: caution with repletion  N: Soft diet  DVT ppx: heparin subq  GI ppx: home PPI    Code Status: full code    Dispo: MICU 62M with HTN, DM c/b neuropathy, hypothyroid, BPH, EtOH liver cirrhosis, toxic megacolon c/b pneumatosis s/p colectomy/ileostomy, with multiple recent admissions (7/27-8/1/2021, 1/6-1/19/2022, and 1/24-1/30/2022) for acute renal failure and hyperkalemia, the last 2 times requiring emergent dialysis, presenting from shelter for altered mental status x1 day. Admitted to MICU for hyperkalemia w/EKG changes.    NEUROLOGIC  #Metabolic encephalopathy. A&O x2-3  Presented with AMS x 1 day. CT head wnl. Utox negative. Alcohol and ammonia levels wnl. Likely 2/2 sepsis from UTI.  -c/w sepsis management as below  -c/w renal failure tx as below  -hold home Trazodone 100mg daily iso AMS    #Tardive dyskinesia  Exam with intermittent involuntary lip-smacking and shoulder shrugging. Reportedly improved s/p Versed 2mg in the ED. Med rec without any antipsychotics.  -consult neuro if continues after metabolic derangements resolved    CARDIOLOGY  #Bradycardia - improved  Junctional bradycardia to 40s on admission, likely 2/2 hyperkalemia. Improved with treatment of hyperkalemia.  -continue to monitor  -c/w hyperkalemia tx as below    #Hypertension, home meds: hydrALAZINE 50mg q8hrs, norvasc 10mg qd  -Normotensive during stay, hold home antihypertensives iso normotensive pressures, restart when appropriate    #HLD  -c/w home atorvastatin 10mg qhs    RESPIRATORY  #Respiratory acidosis  Winter's formula for metabolic acidosis reveals concurrent respiratory acidosis. Possibly 2/2 benzo given in the ED for tardive dyskinesia.  -Avoid further sedatives at this time  -Hold home Trazodone 100mg daily and Duloxetine 30mg daily    GASTROINTESTINAL  # Cirrhosis  Patient with history of alcohol overuse and cirrhosis.  Currently not decompensated. Reports that last drink was 10 years ago. Ammonia level on admission wnl.  -continue to monitor    # Ostomy d/t hx of toxic megacolon, output draining brown stool  -continue to monitor output    RENAL & GENITOURINARY  #Acute renal failure w/hyperkalemia  -Multiple recent admissions for acute renal failure with hyperkalemia, the last 2 times requiring emergent dialysis. Currently oliguric however responding to medical management of hyperkalemia overnight. Acidosis improving. CT abd/pelvis without obvious hydronephrosis or stone. Presenting this admission with sepsis 2/2 UTI.  -continue to trend BMP, Mg, Phos q6hr  -repeat hyperkalemic treatment as needed  -c/w Lokelma 10g q8hr x 24hrs  -increase bicarb gtt to 150mL/hr  -avoid nephrotoxic medications and renally dose medications  -strict I/Os  -renal consulted, will appreciate recs  -OSH med rec w/daily bicarb tabs- consider increasing dose when discharged    #Hyponatremia  -Na 121 on admission, increased to 127 after 2.5L NS bolus in the ED. On D5 overnight for with bicarb gtt. Na 132 this AM, now s/p 1L D5, repeat Na 126  -hold home Duloxetine  -goal Na no greater than 129 before 2/6 at 6pm  -If Na is above goal, consider D5 bolus and/or DDAVP    #Non-anion gap metabolic acidosis  Possibly due to renal dysfunction vs GI losses from colostomy. Improving on bicarb gtt.  -increased bicarb gtt to 150mL/hr  -Monitor pH    # BPH  -c/w home tamsulosin 0.4mg qhs    ID  #Sepsis 2/2 UTI  Hypothermic w/WBC 22 on admission. Lactate 3 on admission now resolved. CT head, chest and abd/pelvis wnl. UA positive. Purulent urine in [place]. Severe sepsis likely 2/2 UTI. Hx of klebsiella in urine 1/6/22. Normothermic and HDS now.  -f/u blood cultures  -dc Zosyn  -start Ceftriaxone  -f/u urine cultures, deescalate w/sensitivities    ENDOCRINE  #DM II, home meds: Janumet 50 mg-500 mg BID. Gabapentin for peripheral neuropathy discontinued last admission.  -hold oral diabetes medications  -monitor FSG and manage hyperglycemia with moderate ISS. based on insulin requirements will consider basal-bolus regimen    #Hypothyroidism  -c/w home synthroid 50mcg qd    MISC  F: bicarb gtt @150mL/hr  E: caution with repletion  N: Soft diet  DVT ppx: heparin subq  GI ppx: home PPI    Code Status: full code    Dispo: MICU 62M with HTN, DM c/b neuropathy, hypothyroid, BPH, EtOH liver cirrhosis, toxic megacolon c/b pneumatosis s/p colectomy/ileostomy, with multiple recent admissions (7/27-8/1/2021, 1/6-1/19/2022, and 1/24-1/30/2022) for acute renal failure and hyperkalemia, the last 2 times requiring emergent dialysis, presenting from shelter for altered mental status x1 day. Admitted to MICU for hyperkalemia w/EKG changes.    NEUROLOGIC  #Metabolic encephalopathy. A&O x2-3  Presented with AMS x 1 day. CT head wnl. Utox negative. Alcohol and ammonia levels wnl. Likely 2/2 sepsis from UTI.  -c/w sepsis management as below  -c/w renal failure tx as below  -hold home Trazodone 100mg daily iso AMS    #Tardive dyskinesia  Exam with intermittent involuntary lip-smacking and shoulder shrugging. Reportedly improved s/p Versed 2mg in the ED. Med rec without any antipsychotics.  -consult neuro if continues after metabolic derangements resolved    CARDIOLOGY  #Bradycardia - improved  Junctional bradycardia to 40s on admission, likely 2/2 hyperkalemia. Improved with treatment of hyperkalemia.  -continue to monitor  -c/w hyperkalemia tx as below    #Hypertension, home meds: hydrALAZINE 50mg q8hrs, norvasc 10mg qd  -Normotensive during stay, hold home antihypertensives iso normotensive pressures, restart when appropriate    #HLD  -c/w home atorvastatin 10mg qhs    RESPIRATORY  #Respiratory acidosis  Winter's formula for metabolic acidosis reveals concurrent respiratory acidosis. Possibly 2/2 benzo given in the ED for tardive dyskinesia.  -Avoid further sedatives at this time  -Hold home Trazodone 100mg daily and Duloxetine 30mg daily    GASTROINTESTINAL  # Cirrhosis  Patient with history of alcohol overuse and cirrhosis.  Currently not decompensated. Reports that last drink was 10 years ago. Ammonia level on admission wnl.  -continue to monitor    # Ostomy d/t hx of toxic megacolon, output draining brown stool  -continue to monitor output    RENAL & GENITOURINARY  #Acute renal failure w/hyperkalemia  -Multiple recent admissions for acute renal failure with hyperkalemia, the last 2 times requiring emergent dialysis. Currently oliguric however responding to medical management of hyperkalemia overnight. Acidosis improving. CT abd/pelvis without obvious hydronephrosis or stone. Presenting this admission with sepsis 2/2 UTI.  -continue to trend BMP, Mg, Phos q6hr  -repeat hyperkalemic treatment as needed  -c/w Lokelma 10g q8hr x 24hrs  -increase bicarb gtt to 150mL/hr  -avoid nephrotoxic medications and renally dose medications  -strict I/Os  -renal consulted, will appreciate recs  -OSH med rec w/daily bicarb tabs- consider increasing dose when discharged    #Hyponatremia  -Na 121 on admission, increased to 127 after 2.5L NS bolus in the ED. On D5 overnight for with bicarb gtt. Na 132 this AM, now s/p 1L D5, repeat Na 126  -hold home Duloxetine  -goal Na no greater than 129 before 2/6 at 6pm  -If Na is above goal, consider D5 bolus and/or DDAVP    #Non-anion gap metabolic acidosis  Possibly due to renal dysfunction vs GI losses from colostomy. Improving on bicarb gtt.  -increased bicarb gtt to 150mL/hr  -Monitor pH    # BPH  -c/w home tamsulosin 0.4mg qhs    ID  #Sepsis 2/2 UTI  Hypothermic w/WBC 22 on admission. Lactate 3 on admission now resolved. CT head, chest and abd/pelvis wnl. UA positive. Purulent urine in [place]. Severe sepsis likely 2/2 UTI. Hx of klebsiella in urine 1/6/22. Normothermic and HDS now.  -f/u blood cultures  -dc Zosyn  -start Ceftriaxone  -f/u urine cultures, deescalate w/sensitivities    ENDOCRINE  #DM II, home meds: Janumet 50 mg-500 mg BID. Gabapentin for peripheral neuropathy discontinued last admission.  -hold oral diabetes medications  -monitor FSG and manage hyperglycemia with moderate ISS  -consider basal-bolus regimen     #Hypothyroidism  -c/w home synthroid 50mcg qd    MISC  F: bicarb gtt @150mL/hr  E: caution with repletion  N: Soft diet  DVT ppx: heparin subq  GI ppx: home PPI    Code Status: full code    Dispo: MICU 62M with HTN, DM c/b neuropathy, hypothyroid, BPH, EtOH liver cirrhosis, toxic megacolon c/b pneumatosis s/p colectomy/ileostomy, with multiple recent admissions (7/27-8/1/2021, 1/6-1/19/2022, and 1/24-1/30/2022) for acute renal failure and hyperkalemia, the last 2 times requiring emergent dialysis, presenting from shelter for altered mental status x1 day. Admitted to MICU for hyperkalemia w/EKG changes.    NEUROLOGIC  #Metabolic encephalopathy. A&O x2-3  Presented with AMS x 1 day. CT head wnl. Utox negative. Alcohol and ammonia levels wnl. Likely 2/2 sepsis from UTI.  -c/w sepsis management as below  -c/w renal failure tx as below  -hold home Trazodone 100mg daily iso AMS    #Tardive dyskinesia  Exam with intermittent involuntary lip-smacking and shoulder shrugging. Reportedly improved s/p Versed 2mg in the ED. Med rec without any antipsychotics.  -consult neuro if continues after metabolic derangements resolved    CARDIOLOGY  #Bradycardia - improved  Junctional bradycardia to 40s on admission, likely 2/2 hyperkalemia. Improved with treatment of hyperkalemia.  -continue to monitor  -c/w hyperkalemia tx as below    #Hypertension, home meds: hydrALAZINE 50mg q8hrs, norvasc 10mg qd  -Normotensive during stay, hold home antihypertensives iso normotensive pressures, restart when appropriate    #HLD  -c/w home atorvastatin 10mg qhs    RESPIRATORY  #Respiratory acidosis  Winter's formula for metabolic acidosis reveals concurrent respiratory acidosis. Possibly 2/2 benzo given in the ED for tardive dyskinesia.  -Avoid further sedatives at this time  -Hold home Trazodone 100mg daily and Duloxetine 30mg daily    GASTROINTESTINAL  # Cirrhosis  Patient with history of alcohol overuse and cirrhosis.  Currently not decompensated. Reports that last drink was 10 years ago. Ammonia level on admission wnl.  -continue to monitor    # Ostomy d/t hx of toxic megacolon, output draining brown stool  -continue to monitor output    RENAL & GENITOURINARY  #Acute renal failure w/hyperkalemia  -Multiple recent admissions for acute renal failure with hyperkalemia, the last 2 times requiring emergent dialysis. Currently oliguric however responding to medical management of hyperkalemia overnight. Acidosis improving. CT abd/pelvis without obvious hydronephrosis or stone. Presenting this admission with sepsis 2/2 UTI.  -continue to trend BMP, Mg, Phos q6hr  -repeat hyperkalemic treatment as needed  -c/w Lokelma 10g q8hr x 24hrs  -increase bicarb gtt to 150mL/hr  -avoid nephrotoxic medications and renally dose medications  -strict I/Os  -renal consulted, will appreciate recs  -OSH med rec w/daily bicarb tabs- consider increasing dose when discharged  -f/u iron studies    #Hyponatremia  -Na 121 on admission, increased to 127 after 2.5L NS bolus in the ED. On D5 overnight for with bicarb gtt. Na 132 this AM, now s/p 1L D5, repeat Na 126  -hold home Duloxetine  -goal Na no greater than 129 before 2/6 at 6pm  -If Na is above goal, consider D5 bolus and/or DDAVP    #Non-anion gap metabolic acidosis  Possibly due to renal dysfunction vs GI losses from colostomy. Improving on bicarb gtt.  -increased bicarb gtt to 150mL/hr  -Monitor pH    # BPH  -c/w home tamsulosin 0.4mg qhs    ID  #Sepsis 2/2 UTI  Hypothermic w/WBC 22 on admission. Lactate 3 on admission now resolved. CT head, chest and abd/pelvis wnl. UA positive. Purulent urine in [place]. Severe sepsis likely 2/2 UTI. Hx of klebsiella in urine 1/6/22. Normothermic and HDS now.  -f/u blood cultures  -dc Zosyn  -start Ceftriaxone  -f/u urine cultures, deescalate w/sensitivities    ENDOCRINE  #DM II, home meds: Janumet 50 mg-500 mg BID. Gabapentin for peripheral neuropathy discontinued last admission.  -hold oral diabetes medications  -monitor FSG and manage hyperglycemia with moderate ISS  -consider basal-bolus regimen     #Hypothyroidism  -c/w home synthroid 50mcg qd    MISC  F: bicarb gtt @150mL/hr  E: caution with repletion  N: Soft diet  DVT ppx: heparin subq  GI ppx: home PPI    Code Status: full code    Dispo: MICU 62M with HTN, DM c/b neuropathy, hypothyroid, BPH, EtOH liver cirrhosis, toxic megacolon c/b pneumatosis s/p colectomy/ileostomy, with multiple recent admissions (7/27-8/1/2021, 1/6-1/19/2022, and 1/24-1/30/2022) for acute renal failure and hyperkalemia, the last 2 times requiring emergent dialysis, presenting from shelter for altered mental status x1 day. Admitted to MICU for hyperkalemia w/EKG changes.    NEUROLOGIC  #Metabolic encephalopathy. A&O x2-3  Presented with AMS x 1 day. CT head wnl. Utox negative. Alcohol and ammonia levels wnl. Likely 2/2 sepsis from UTI.  -c/w sepsis management as below  -c/w renal failure tx as below  -hold home Trazodone 100mg daily iso AMS    #Tardive dyskinesia  Exam with intermittent involuntary lip-smacking and shoulder shrugging. Reportedly improved s/p Versed 2mg in the ED. Med rec without any antipsychotics.  -consult neuro if continues after metabolic derangements resolved    CARDIOLOGY  #Bradycardia - improved  Junctional bradycardia to 40s on admission, likely 2/2 hyperkalemia. Improved with treatment of hyperkalemia.  -continue to monitor  -c/w hyperkalemia tx as below    #Hypertension, home meds: hydrALAZINE 50mg q8hrs, norvasc 10mg qd  -Normotensive during stay, hold home antihypertensives iso normotensive pressures, restart when appropriate    #HLD  -c/w home atorvastatin 10mg qhs    RESPIRATORY  #Respiratory acidosis  Winter's formula for metabolic acidosis reveals concurrent respiratory acidosis. Possibly 2/2 benzo given in the ED for tardive dyskinesia.  -Avoid further sedatives at this time  -Hold home Trazodone 100mg daily and Duloxetine 30mg daily    GASTROINTESTINAL  #Cirrhosis  Patient with history of alcohol overuse and cirrhosis.  Currently not decompensated. Reports that last drink was 10 years ago. Ammonia level on admission wnl.  -continue to monitor    #Ostomy d/t hx of toxic megacolon, output draining brown stool  -continue to monitor output    RENAL & GENITOURINARY  #Acute renal failure w/hyperkalemia  -Multiple recent admissions for acute renal failure with hyperkalemia, the last 2 times requiring emergent dialysis. Currently oliguric however responding to medical management of hyperkalemia overnight. Acidosis improving. CT abd/pelvis without obvious hydronephrosis or stone. Presenting this admission with sepsis 2/2 UTI.  -continue to trend BMP, Mg, Phos q6hr  -repeat hyperkalemic treatment as needed  -c/w Lokelma 10g q8hr x 24hrs  -increase bicarb gtt to 150mL/hr  -avoid nephrotoxic medications and renally dose medications  -strict I/Os  -renal consulted, will appreciate recs  -OSH med rec w/daily bicarb tabs- consider increasing dose when discharged    #Hyponatremia  -Na 121 on admission, increased to 127 after 2.5L NS bolus in the ED. On D5 overnight for with bicarb gtt. Na 132 this AM, now s/p 1L D5, repeat Na 126  -hold home Duloxetine  -goal Na no greater than 129 before 2/6 at 6pm  -If Na is above goal, consider D5 bolus and/or DDAVP    #Non-anion gap metabolic acidosis  Possibly due to renal dysfunction vs GI losses from colostomy. Improving on bicarb gtt.  -increased bicarb gtt to 150mL/hr  -Monitor pH    # BPH  -c/w home tamsulosin 0.4mg qhs    ID  #Sepsis 2/2 UTI  Hypothermic w/WBC 22 on admission. Lactate 3 on admission now resolved. CT head, chest and abd/pelvis wnl. UA positive. Purulent urine in [place]. Severe sepsis likely 2/2 UTI. Hx of klebsiella in urine 1/6/22. Normothermic and HDS now.  -f/u blood cultures  -dc Zosyn  -start Ceftriaxone  -f/u urine cultures, deescalate w/sensitivities    ENDOCRINE  #DM II, home meds: Janumet 50 mg-500 mg BID. Gabapentin for peripheral neuropathy discontinued last admission.  -hold oral diabetes medications  -monitor FSG and manage hyperglycemia with moderate ISS  -consider basal-bolus regimen     #Hypothyroidism  -c/w home synthroid 50mcg qd    HEME  #Anemia without acute blood loss, 9/27.5, MCV 92.3, B12 & folate WNL 1/22  -trend CBCs  -f/u iron studies    MISC  F: bicarb gtt @150mL/hr  E: caution with repletion  N: Soft diet  DVT ppx: heparin subq  GI ppx: home PPI    Code Status: full code    Dispo: MICU

## 2022-02-07 DIAGNOSIS — E11.9 TYPE 2 DIABETES MELLITUS WITHOUT COMPLICATIONS: ICD-10-CM

## 2022-02-07 DIAGNOSIS — D64.9 ANEMIA, UNSPECIFIED: ICD-10-CM

## 2022-02-07 DIAGNOSIS — Z00.00 ENCOUNTER FOR GENERAL ADULT MEDICAL EXAMINATION WITHOUT ABNORMAL FINDINGS: ICD-10-CM

## 2022-02-07 DIAGNOSIS — A41.9 SEPSIS, UNSPECIFIED ORGANISM: ICD-10-CM

## 2022-02-07 DIAGNOSIS — E78.5 HYPERLIPIDEMIA, UNSPECIFIED: ICD-10-CM

## 2022-02-07 DIAGNOSIS — G93.41 METABOLIC ENCEPHALOPATHY: ICD-10-CM

## 2022-02-07 DIAGNOSIS — N17.9 ACUTE KIDNEY FAILURE, UNSPECIFIED: ICD-10-CM

## 2022-02-07 DIAGNOSIS — K74.60 UNSPECIFIED CIRRHOSIS OF LIVER: ICD-10-CM

## 2022-02-07 DIAGNOSIS — K94.19 OTHER COMPLICATIONS OF ENTEROSTOMY: ICD-10-CM

## 2022-02-07 DIAGNOSIS — E87.1 HYPO-OSMOLALITY AND HYPONATREMIA: ICD-10-CM

## 2022-02-07 DIAGNOSIS — N40.0 BENIGN PROSTATIC HYPERPLASIA WITHOUT LOWER URINARY TRACT SYMPTOMS: ICD-10-CM

## 2022-02-07 DIAGNOSIS — I10 ESSENTIAL (PRIMARY) HYPERTENSION: ICD-10-CM

## 2022-02-07 LAB
ALBUMIN SERPL ELPH-MCNC: 3.7 G/DL — SIGNIFICANT CHANGE UP (ref 3.3–5)
ALP SERPL-CCNC: 67 U/L — SIGNIFICANT CHANGE UP (ref 40–120)
ALT FLD-CCNC: 13 U/L — SIGNIFICANT CHANGE UP (ref 10–45)
ANION GAP SERPL CALC-SCNC: 9 MMOL/L — SIGNIFICANT CHANGE UP (ref 5–17)
AST SERPL-CCNC: 14 U/L — SIGNIFICANT CHANGE UP (ref 10–40)
BASOPHILS # BLD AUTO: 0.03 K/UL — SIGNIFICANT CHANGE UP (ref 0–0.2)
BASOPHILS NFR BLD AUTO: 0.5 % — SIGNIFICANT CHANGE UP (ref 0–2)
BILIRUB SERPL-MCNC: <0.2 MG/DL — SIGNIFICANT CHANGE UP (ref 0.2–1.2)
BUN SERPL-MCNC: 32 MG/DL — HIGH (ref 7–23)
CALCIUM SERPL-MCNC: 9.3 MG/DL — SIGNIFICANT CHANGE UP (ref 8.4–10.5)
CHLORIDE SERPL-SCNC: 99 MMOL/L — SIGNIFICANT CHANGE UP (ref 96–108)
CO2 SERPL-SCNC: 26 MMOL/L — SIGNIFICANT CHANGE UP (ref 22–31)
CREAT SERPL-MCNC: 1.82 MG/DL — HIGH (ref 0.5–1.3)
EOSINOPHIL # BLD AUTO: 0.2 K/UL — SIGNIFICANT CHANGE UP (ref 0–0.5)
EOSINOPHIL NFR BLD AUTO: 3.3 % — SIGNIFICANT CHANGE UP (ref 0–6)
GLUCOSE BLDC GLUCOMTR-MCNC: 175 MG/DL — HIGH (ref 70–99)
GLUCOSE BLDC GLUCOMTR-MCNC: 190 MG/DL — HIGH (ref 70–99)
GLUCOSE BLDC GLUCOMTR-MCNC: 234 MG/DL — HIGH (ref 70–99)
GLUCOSE BLDC GLUCOMTR-MCNC: 260 MG/DL — HIGH (ref 70–99)
GLUCOSE SERPL-MCNC: 166 MG/DL — HIGH (ref 70–99)
HCT VFR BLD CALC: 26.3 % — LOW (ref 39–50)
HGB BLD-MCNC: 8.7 G/DL — LOW (ref 13–17)
IMM GRANULOCYTES NFR BLD AUTO: 0.3 % — SIGNIFICANT CHANGE UP (ref 0–1.5)
LYMPHOCYTES # BLD AUTO: 1.69 K/UL — SIGNIFICANT CHANGE UP (ref 1–3.3)
LYMPHOCYTES # BLD AUTO: 27.6 % — SIGNIFICANT CHANGE UP (ref 13–44)
MAGNESIUM SERPL-MCNC: 1.6 MG/DL — SIGNIFICANT CHANGE UP (ref 1.6–2.6)
MCHC RBC-ENTMCNC: 30.3 PG — SIGNIFICANT CHANGE UP (ref 27–34)
MCHC RBC-ENTMCNC: 33.1 GM/DL — SIGNIFICANT CHANGE UP (ref 32–36)
MCV RBC AUTO: 91.6 FL — SIGNIFICANT CHANGE UP (ref 80–100)
MONOCYTES # BLD AUTO: 0.84 K/UL — SIGNIFICANT CHANGE UP (ref 0–0.9)
MONOCYTES NFR BLD AUTO: 13.7 % — SIGNIFICANT CHANGE UP (ref 2–14)
NEUTROPHILS # BLD AUTO: 3.34 K/UL — SIGNIFICANT CHANGE UP (ref 1.8–7.4)
NEUTROPHILS NFR BLD AUTO: 54.6 % — SIGNIFICANT CHANGE UP (ref 43–77)
NRBC # BLD: 0 /100 WBCS — SIGNIFICANT CHANGE UP (ref 0–0)
PHOSPHATE SERPL-MCNC: 2.5 MG/DL — SIGNIFICANT CHANGE UP (ref 2.5–4.5)
PLATELET # BLD AUTO: 167 K/UL — SIGNIFICANT CHANGE UP (ref 150–400)
POTASSIUM SERPL-MCNC: 4.7 MMOL/L — SIGNIFICANT CHANGE UP (ref 3.5–5.3)
POTASSIUM SERPL-SCNC: 4.7 MMOL/L — SIGNIFICANT CHANGE UP (ref 3.5–5.3)
PROT SERPL-MCNC: 7.2 G/DL — SIGNIFICANT CHANGE UP (ref 6–8.3)
RBC # BLD: 2.87 M/UL — LOW (ref 4.2–5.8)
RBC # FLD: 15.1 % — HIGH (ref 10.3–14.5)
SODIUM SERPL-SCNC: 134 MMOL/L — LOW (ref 135–145)
WBC # BLD: 6.12 K/UL — SIGNIFICANT CHANGE UP (ref 3.8–10.5)
WBC # FLD AUTO: 6.12 K/UL — SIGNIFICANT CHANGE UP (ref 3.8–10.5)

## 2022-02-07 PROCEDURE — 99233 SBSQ HOSP IP/OBS HIGH 50: CPT

## 2022-02-07 PROCEDURE — 99233 SBSQ HOSP IP/OBS HIGH 50: CPT | Mod: GC

## 2022-02-07 RX ORDER — INSULIN GLARGINE 100 [IU]/ML
8 INJECTION, SOLUTION SUBCUTANEOUS AT BEDTIME
Refills: 0 | Status: DISCONTINUED | OUTPATIENT
Start: 2022-02-07 | End: 2022-02-09

## 2022-02-07 RX ORDER — HYDRALAZINE HCL 50 MG
25 TABLET ORAL ONCE
Refills: 0 | Status: COMPLETED | OUTPATIENT
Start: 2022-02-07 | End: 2022-02-07

## 2022-02-07 RX ORDER — HYDRALAZINE HCL 50 MG
75 TABLET ORAL EVERY 8 HOURS
Refills: 0 | Status: DISCONTINUED | OUTPATIENT
Start: 2022-02-07 | End: 2022-02-16

## 2022-02-07 RX ORDER — MAGNESIUM SULFATE 500 MG/ML
2 VIAL (ML) INJECTION ONCE
Refills: 0 | Status: COMPLETED | OUTPATIENT
Start: 2022-02-07 | End: 2022-02-07

## 2022-02-07 RX ORDER — LANOLIN ALCOHOL/MO/W.PET/CERES
5 CREAM (GRAM) TOPICAL AT BEDTIME
Refills: 0 | Status: DISCONTINUED | OUTPATIENT
Start: 2022-02-07 | End: 2022-02-16

## 2022-02-07 RX ORDER — AMLODIPINE BESYLATE 2.5 MG/1
10 TABLET ORAL DAILY
Refills: 0 | Status: DISCONTINUED | OUTPATIENT
Start: 2022-02-07 | End: 2022-02-16

## 2022-02-07 RX ORDER — HYDRALAZINE HCL 50 MG
50 TABLET ORAL EVERY 8 HOURS
Refills: 0 | Status: DISCONTINUED | OUTPATIENT
Start: 2022-02-07 | End: 2022-02-07

## 2022-02-07 RX ORDER — SODIUM BICARBONATE 1 MEQ/ML
650 SYRINGE (ML) INTRAVENOUS EVERY 8 HOURS
Refills: 0 | Status: DISCONTINUED | OUTPATIENT
Start: 2022-02-07 | End: 2022-02-16

## 2022-02-07 RX ORDER — AMLODIPINE BESYLATE 2.5 MG/1
10 TABLET ORAL DAILY
Refills: 0 | Status: DISCONTINUED | OUTPATIENT
Start: 2022-02-07 | End: 2022-02-07

## 2022-02-07 RX ORDER — GABAPENTIN 400 MG/1
100 CAPSULE ORAL ONCE
Refills: 0 | Status: COMPLETED | OUTPATIENT
Start: 2022-02-07 | End: 2022-02-07

## 2022-02-07 RX ORDER — HYDRALAZINE HCL 50 MG
10 TABLET ORAL ONCE
Refills: 0 | Status: COMPLETED | OUTPATIENT
Start: 2022-02-07 | End: 2022-02-07

## 2022-02-07 RX ADMIN — Medication 25 GRAM(S): at 08:04

## 2022-02-07 RX ADMIN — Medication 50 MICROGRAM(S): at 07:35

## 2022-02-07 RX ADMIN — Medication 4: at 16:33

## 2022-02-07 RX ADMIN — Medication 5 MILLIGRAM(S): at 02:07

## 2022-02-07 RX ADMIN — Medication 50 MILLIGRAM(S): at 15:43

## 2022-02-07 RX ADMIN — Medication 75 MILLIGRAM(S): at 21:22

## 2022-02-07 RX ADMIN — PANTOPRAZOLE SODIUM 40 MILLIGRAM(S): 20 TABLET, DELAYED RELEASE ORAL at 07:35

## 2022-02-07 RX ADMIN — Medication 650 MILLIGRAM(S): at 13:17

## 2022-02-07 RX ADMIN — Medication 6: at 12:15

## 2022-02-07 RX ADMIN — HEPARIN SODIUM 5000 UNIT(S): 5000 INJECTION INTRAVENOUS; SUBCUTANEOUS at 07:34

## 2022-02-07 RX ADMIN — Medication 325 MILLIGRAM(S): at 12:14

## 2022-02-07 RX ADMIN — HEPARIN SODIUM 5000 UNIT(S): 5000 INJECTION INTRAVENOUS; SUBCUTANEOUS at 13:17

## 2022-02-07 RX ADMIN — HEPARIN SODIUM 5000 UNIT(S): 5000 INJECTION INTRAVENOUS; SUBCUTANEOUS at 21:21

## 2022-02-07 RX ADMIN — INSULIN GLARGINE 8 UNIT(S): 100 INJECTION, SOLUTION SUBCUTANEOUS at 21:22

## 2022-02-07 RX ADMIN — GABAPENTIN 100 MILLIGRAM(S): 400 CAPSULE ORAL at 17:52

## 2022-02-07 RX ADMIN — CEFTRIAXONE 1000 MILLIGRAM(S): 500 INJECTION, POWDER, FOR SOLUTION INTRAMUSCULAR; INTRAVENOUS at 12:14

## 2022-02-07 RX ADMIN — Medication 10 MILLIGRAM(S): at 17:16

## 2022-02-07 RX ADMIN — AMLODIPINE BESYLATE 10 MILLIGRAM(S): 2.5 TABLET ORAL at 13:38

## 2022-02-07 RX ADMIN — Medication 2: at 21:38

## 2022-02-07 RX ADMIN — Medication 25 MILLIGRAM(S): at 19:12

## 2022-02-07 NOTE — PROGRESS NOTE ADULT - ASSESSMENT
Mr Rock is a 62M with HTN, DM c/b neuropathy, hypothyroid, BPH, EtOH liver cirrhosis, toxic megacolon c/b pneumatosis s/p colectomy/ileostomy, with multiple recent admissions (7/27-8/1/2021, 1/6-1/19/2022, and 1/24-1/30/2022) for acute renal failure and hyperkalemia, the last 2 times requiring emergent dialysis, presenting from shelter for altered mental status x1 day. Admitted to MICU for ARF and hyperkalemia w/EKG changes; now resolved and stable for step down to telemetry.

## 2022-02-07 NOTE — PROGRESS NOTE ADULT - PROBLEM SELECTOR PLAN 6
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 Hyperkalemia resolved without dialysis this admission. Acidosis now resolved. CT abd/pelvis without obvious hydronephrosis or stone. Presenting this admission with sepsis 2/2 UTI.  -Baseline Cr 1-1.2  -continue to trend BMP, Mg, Phos q12  -repeat hyperkalemic treatment as needed however K wnl this am  -Cr slowly downtrending, 1.82 today   -s/p Lokelma 10g q8hr x 24hrs  -d/c bicarb drip, start PO bicarb tabs 650mg q8  -avoid nephrotoxic medications and renally dose medications  -strict I/Os; goal keep net even (can give IV NS if needed)  -renal following, recs appreciated   -OSH med rec w/daily bicarb tabs- consider increasing dose when discharged  -f/u AM cortisol to eval for type 4 RTA

## 2022-02-07 NOTE — PROGRESS NOTE ADULT - PROBLEM SELECTOR PLAN 10
home meds: Janumet 50 mg-500 mg BID. Gabapentin for peripheral neuropathy discontinued last admission.  A1C 6.7  -hold oral diabetes medications  -monitor FSG   -c/w mISS  -started on lantus 8 units nightly as sugars remaining elevated; consider increasing to 10-13 units if sugars persistently elevated     #Hypothyroidism  -c/w home synthroid 50mcg qd

## 2022-02-07 NOTE — DIETITIAN INITIAL EVALUATION ADULT. - OTHER CALCULATIONS
IBW used to estimate needs as pt weighs >100% of IBW and per critical care nutrition guidelines. Needs estimated for acute illness, age, renal function .  Fluids per team

## 2022-02-07 NOTE — PROGRESS NOTE ADULT - PROBLEM SELECTOR PLAN 2
home meds: hydrALAZINE 50mg q8hrs, norvasc 10mg qd  -restarted home norvasc as BPs consistently high  -holding home hydralazine, restart as appropriate

## 2022-02-07 NOTE — DIETITIAN INITIAL EVALUATION ADULT. - PERTINENT LABORATORY DATA
sodium 134 L, BUN 32 H, creatinine 1.82 H, glucose 166 H, GFR 39 L   POCT glucose x24hrs: 175-260  1/06: A1c 6.7% H

## 2022-02-07 NOTE — DIETITIAN INITIAL EVALUATION ADULT. - OTHER INFO
62M with HTN, DM c/b neuropathy, hypothyroid, BPH, EtOH liver cirrhosis, toxic megacolon c/b pneumatosis s/p colectomy/ileostomy, with multiple recent admissions (7/27-8/1/2021, 1/6-1/19/2022, and 1/24-1/30/2022) for acute renal failure and hyperkalemia, the last 2 times requiring emergent dialysis, presenting from shelter for altered mental status x1 day. Pt was somnolent on admission, A&Ox3. Nephrology consulted, renal function improving with no plan for dialysis at this time.     *Discussed pt during interdisciplinary rounds today. Pt observed talking to TV. Attempted to collect diet hx from pt, but inconsistent with answers. Noted breakfast tray at bedside table, pt ate 100% of dry cereal with milk, grits, and drank hot tea. Appetite appears intact.     GI: ostomy output- 500ml x24hrs  Denies N/V, abd-soft/ nondistended   Skin: Intact, no pressure breakdown   Kendell Score: 17    Per EMR, 4.1kg (6%) wt loss x 4 months which is not significant per ASPEN standards.   10/11/21: 72.6kg

## 2022-02-07 NOTE — PROGRESS NOTE ADULT - PROBLEM SELECTOR PLAN 7
-Na 121 on admission, increased to 127 after 2.5L NS bolus in the ED. Na improved to 134 this AM  -s/p D5 and bicarb drips   -s/p 1 dose DDAVP yesterday for overcorrection   -Na 134 today, correcting well, holding further fluids for now as at goal  -continue to trend BMP q12 to monitor   -hold home Duloxetine  -renal following, appreciate recs

## 2022-02-07 NOTE — PROGRESS NOTE ADULT - SUBJECTIVE AND OBJECTIVE BOX
[facility] DIVISION OF KIDNEY DISEASES AND HYPERTENSION -- FOLLOW UP NOTE  --------------------------------------------------------------------------------  Chief Complaint:  AMS  24 hour events/subjective:  He feels much better today. Denies pain, denies urinary hesitancy. He denies any toxic ingestion, says he "doesn't know what happened". Does say he is taking advil occasionally. Denies new changes in meds.  No rash, joint pain, unexpected weight loss or fevers    PAST HISTORY  --------------------------------------------------------------------------------  No significant changes to PMH, PSH, FHx, SHx, unless otherwise noted    ALLERGIES & MEDICATIONS  --------------------------------------------------------------------------------  Allergies    No Known Allergies    Intolerances      Standing Inpatient Medications  amLODIPine   Tablet 10 milliGRAM(s) Oral daily  atorvastatin 10 milliGRAM(s) Oral at bedtime  cefTRIAXone Injectable. 1000 milliGRAM(s) IV Push every 24 hours  dextrose 40% Gel 15 Gram(s) Oral once  dextrose 5%. 1000 milliLiter(s) IV Continuous <Continuous>  dextrose 5%. 1000 milliLiter(s) IV Continuous <Continuous>  dextrose 50% Injectable 25 Gram(s) IV Push once  dextrose 50% Injectable 12.5 Gram(s) IV Push once  dextrose 50% Injectable 25 Gram(s) IV Push once  ferrous    sulfate 325 milliGRAM(s) Oral daily  glucagon  Injectable 1 milliGRAM(s) IntraMuscular once  glucagon  Injectable 1 milliGRAM(s) IntraMuscular once  heparin   Injectable 5000 Unit(s) SubCutaneous every 8 hours  insulin glargine Injectable (LANTUS) 8 Unit(s) SubCutaneous at bedtime  insulin lispro (ADMELOG) corrective regimen sliding scale   SubCutaneous Before meals and at bedtime  levothyroxine 50 MICROGram(s) Oral daily  melatonin 5 milliGRAM(s) Oral at bedtime  pantoprazole    Tablet 40 milliGRAM(s) Oral before breakfast  sodium bicarbonate 650 milliGRAM(s) Oral every 8 hours  tamsulosin 0.4 milliGRAM(s) Oral at bedtime    PRN Inpatient Medications      REVIEW OF SYSTEMS  --------------------------------------------------------------------------------  Gen: No weight changes, fatigue, fevers/chills, weakness  Skin: No rashes  Head/Eyes/Ears/Mouth: No headache; Normal hearing; Normal vision w/o blurriness; No sinus pain/discomfort, sore throat  Respiratory: No dyspnea, cough, wheezing, hemoptysis  CV: No chest pain, PND, orthopnea  GI: No abdominal pain, diarrhea, constipation, nausea  : No increased frequency, dysuria, hematuria, nocturia  MSK: No joint pain/swelling; no back pain; no edema  Neuro: No dizziness/lightheadedness, weakness, seizures, numbness, tingling  Heme: No easy bruising or bleeding  Endo: No heat/cold intolerance  Psych: No significant nervousness, anxiety, stress, depression    All other systems were reviewed and are negative, except as noted.    VITALS/PHYSICAL EXAM  --------------------------------------------------------------------------------  T(C): 37.1 (02-07-22 @ 09:55), Max: 37.1 (02-06-22 @ 17:00)  HR: 76 (02-07-22 @ 14:00) (64 - 81)  BP: 182/76 (02-07-22 @ 14:00) (120/58 - 209/84)  RR: 29 (02-07-22 @ 14:00) (16 - 37)  SpO2: 99% (02-07-22 @ 14:00) (98% - 100%)  Wt(kg): --  Height (cm): 167.6 (02-05-22 @ 19:09)  Weight (kg): 68.5 (02-05-22 @ 22:13)  BMI (kg/m2): 24.4 (02-05-22 @ 22:13)  BSA (m2): 1.77 (02-05-22 @ 22:13)      02-06-22 @ 07:01  -  02-07-22 @ 07:00  --------------------------------------------------------  IN: 1500 mL / OUT: 4040 mL / NET: -2540 mL    02-07-22 @ 07:01  -  02-07-22 @ 15:13  --------------------------------------------------------  IN: 0 mL / OUT: 1635 mL / NET: -1635 mL      Physical Exam:  	Gen: NAD, well-appearing  	HEENT: PERRL, supple neck  	Pulm: CTA B/L  	CV: RRR, S1S2; no rub  	Abd: +BS, soft, nontender/nondistended  	: No suprapubic tenderness, soliz with dilute urine draining  	UE: Warm, no asterixis  	LE: Warm, no edema  	Neuro: No focal deficits  	Psych: flat affect  	Skin: Warm, without rashes      LABS/STUDIES  --------------------------------------------------------------------------------              8.7    6.12  >-----------<  167      [02-07-22 @ 05:41]              26.3     134  |  99  |  32  ----------------------------<  166      [02-07-22 @ 05:41]  4.7   |  26  |  1.82        Ca     9.3     [02-07-22 @ 05:41]      Mg     1.6     [02-07-22 @ 05:41]      Phos  2.5     [02-07-22 @ 05:41]    TPro  7.2  /  Alb  3.7  /  TBili  <0.2  /  DBili  x   /  AST  14  /  ALT  13  /  AlkPhos  67  [02-07-22 @ 05:41]    PT/INR: PT 12.7 , INR 1.06       [02-05-22 @ 22:07]  PTT: 25.1       [02-05-22 @ 22:07]      Creatinine Trend:  SCr 1.82 [02-07 @ 05:41]  SCr 1.96 [02-06 @ 21:11]  SCr 2.32 [02-06 @ 17:08]  SCr 3.09 [02-06 @ 11:21]  SCr 4.01 [02-06 @ 05:50]    Urinalysis - [02-05-22 @ 18:40]      Color Yellow / Appearance Clear / SG 1.020 / pH 6.5      Gluc NEGATIVE / Ketone NEGATIVE  / Bili NEGATIVE / Urobili 0.2       Blood Moderate / Protein >=300 / Leuk Est Large / Nitrite NEGATIVE      RBC > 10 / WBC Many / Hyaline  / Gran  / Sq Epi  / Non Sq Epi 0-5 / Bacteria Many    Urine Creatinine 28      [02-06-22 @ 09:15]  Urine Sodium 38      [02-06-22 @ 09:15]  Urine Urea Nitrogen 212      [02-06-22 @ 09:15]  Urine Osmolality 226      [02-06-22 @ 09:15]    Iron 35, TIBC 213, %sat 16      [02-06-22 @ 01:29]  Ferritin 878      [02-06-22 @ 01:29]  HbA1c 6.9      [04-05-20 @ 06:59]  TSH 1.120      [01-06-22 @ 11:49]      Syphilis Screen (Treponema Pallidum Ab) Negative      [01-10-22 @ 19:23]

## 2022-02-07 NOTE — DIETITIAN INITIAL EVALUATION ADULT. - CONTINUE CURRENT NUTRITION CARE PLAN
Soft and bite sized/ CHO consistent diet. No need for renal restriction d/t K,Phos WNL and renal function improving/yes

## 2022-02-07 NOTE — PROGRESS NOTE ADULT - SUBJECTIVE AND OBJECTIVE BOX
***Note in progress***    OVERNIGHT EVENTS: NAEO    SUBJECTIVE / INTERVAL HPI: Patient seen and examined at bedside. Patient denying chest pain, SOB, palpitations, cough. Patient denies fever, chills, HA, Dizziness, change in vision/hearing, N/V, abdominal pain, diarrhea, constipation, hematochezia/melena, dysuria, hematuria, new onset weakness/numbness, LE pain and/or swelling.    Remaining ROS negative       PHYSICAL EXAM:    General: WDWN  HEENT: NC/AT; PERRL, anicteric sclera; MMM  Neck: supple  Cardiovascular: +S1/S2, RRR  Respiratory: CTA B/L; no W/R/R  Gastrointestinal: soft, NT/ND; +BSx4  Extremities: WWP; no edema, clubbing or cyanosis  Vascular: 2+ radial, DP/PT pulses B/L  Neurological: AAOx3; no focal deficits  Psychiatric: pleasant mood and affect  Dermatologic: no appreciable wounds or damage to the skin    VITAL SIGNS:  Vital Signs Last 24 Hrs  T(C): 37.1 (2022 09:55), Max: 37.1 (2022 17:00)  T(F): 98.7 (2022 09:55), Max: 98.8 (2022 17:00)  HR: 70 (2022 09:00) (61 - 76)  BP: 163/72 (2022 09:00) (120/58 - 163/72)  BP(mean): 103 (2022 09:00) (80 - 108)  RR: 32 (2022 09:00) (16 - 37)  SpO2: 99% (2022 09:00) (97% - 100%)      MEDICATIONS:  MEDICATIONS  (STANDING):  atorvastatin 10 milliGRAM(s) Oral at bedtime  cefTRIAXone Injectable. 1000 milliGRAM(s) IV Push every 24 hours  dextrose 40% Gel 15 Gram(s) Oral once  dextrose 5%. 1000 milliLiter(s) (100 mL/Hr) IV Continuous <Continuous>  dextrose 5%. 1000 milliLiter(s) (50 mL/Hr) IV Continuous <Continuous>  dextrose 50% Injectable 25 Gram(s) IV Push once  dextrose 50% Injectable 12.5 Gram(s) IV Push once  dextrose 50% Injectable 25 Gram(s) IV Push once  ferrous    sulfate 325 milliGRAM(s) Oral daily  glucagon  Injectable 1 milliGRAM(s) IntraMuscular once  glucagon  Injectable 1 milliGRAM(s) IntraMuscular once  heparin   Injectable 5000 Unit(s) SubCutaneous every 8 hours  insulin glargine Injectable (LANTUS) 8 Unit(s) SubCutaneous at bedtime  insulin lispro (ADMELOG) corrective regimen sliding scale   SubCutaneous Before meals and at bedtime  levothyroxine 50 MICROGram(s) Oral daily  melatonin 5 milliGRAM(s) Oral at bedtime  pantoprazole    Tablet 40 milliGRAM(s) Oral before breakfast  sodium bicarbonate 650 milliGRAM(s) Oral every 8 hours  tamsulosin 0.4 milliGRAM(s) Oral at bedtime    MEDICATIONS  (PRN):      ALLERGIES:  Allergies    No Known Allergies    Intolerances        LABS:                        8.7    6.12  )-----------( 167      ( 2022 05:41 )             26.3     02-07    134<L>  |  99  |  32<H>  ----------------------------<  166<H>  4.7   |  26  |  1.82<H>    Ca    9.3      2022 05:41  Phos  2.5     02-07  Mg     1.6     -07    TPro  7.2  /  Alb  3.7  /  TBili  <0.2  /  DBili  x   /  AST  14  /  ALT  13  /  AlkPhos  67  02-07    PT/INR - ( 2022 22:07 )   PT: 12.7 sec;   INR: 1.06          PTT - ( 2022 22:07 )  PTT:25.1 sec  Urinalysis Basic - ( 2022 18:40 )    Color: Yellow / Appearance: Clear / S.020 / pH: x  Gluc: x / Ketone: NEGATIVE  / Bili: NEGATIVE / Urobili: 0.2 E.U./dL   Blood: x / Protein: >=300 mg/dL / Nitrite: NEGATIVE   Leuk Esterase: Large / RBC: > 10 /HPF / WBC Many /HPF   Sq Epi: x / Non Sq Epi: 0-5 /HPF / Bacteria: Many /HPF      CAPILLARY BLOOD GLUCOSE      POCT Blood Glucose.: 175 mg/dL (2022 05:23)      RADIOLOGY & ADDITIONAL TESTS: Reviewed. **********TRANSFER FROM MICU TO [facility]************    HOSPITAL COURSE: Mr Wilkerson is a 62M with PMH of HTN, DM c/b neuropathy, hypothyroid, BPH, EtOH liver cirrhosis, toxic megacolon c/b pneumatosis s/p colectomy/ileostomy, with multiple recent admissions (-2021, -2022, and -2022) for acute renal failure and hyperkalemia, the last 2 times requiring emergent dialysis, presenting from shelter for altered mental status x1 day. He was found to have hyperkalemia to 8.1 with associated EKG changes (peaked T waves) on admission; also found to be in acute renal failure with lactate of 3.0, hyponatremia and metabolic acidosis; also found to have sepsis 2/2 UTI; admitted to MICU for acute renal failure with hyperkalemia for possible emergent dialysis. CT head negative; CT chest/abd/pelvis with no acute pathology, cholelithiasis, trace pericardial effusion. Patient was given D5+insulin, bicarb, albuterol, calcium gluconate, versed, and started on vanc/zosyn. Patient was started on standing lokelma and bicarb drip. Renal consulted. Vanc/zosyn was d/vitaly and patient was started on ceftriaxone. On  Na overcorrected too fast to 134, was given 2mcg of DDAVP. Bicarb drip d/vitaly as acidosis resolved and Na at goal. Started on lantus 8 units (in addition to sliding scale) as fingersticks remaining high. 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 Patient now stable for stepdown to telemetry.    OVERNIGHT EVENTS: 9pm sodium 134, OK to check next in AM per nephro. dc'ed bicarb gtt.      SUBJECTIVE / INTERVAL HPI: Patient seen and examined at bedside. Patient denying chest pain, SOB, palpitations, cough. Patient denies fever, chills, HA, Dizziness, change in vision/hearing, N/V, abdominal pain, diarrhea, constipation, hematochezia/melena, dysuria, hematuria, new onset weakness/numbness, LE pain and/or swelling.    Remaining ROS negative       PHYSICAL EXAM:    General: WDWN  HEENT: NC/AT; PERRL, anicteric sclera; MMM  Neck: supple  Cardiovascular: +S1/S2, RRR  Respiratory: CTA B/L; no W/R/R  Gastrointestinal: soft, NT/ND; +BSx4  Extremities: WWP; no edema, clubbing or cyanosis  Vascular: 2+ radial, DP/PT pulses B/L  Neurological: AAOx3; no focal deficits  Psychiatric: pleasant mood and affect  Dermatologic: no appreciable wounds or damage to the skin    VITAL SIGNS:  Vital Signs Last 24 Hrs  T(C): 37.1 (2022 09:55), Max: 37.1 (2022 17:00)  T(F): 98.7 (2022 09:55), Max: 98.8 (2022 17:00)  HR: 70 (2022 09:00) (61 - 76)  BP: 163/72 (2022 09:00) (120/58 - 163/72)  BP(mean): 103 (2022 09:00) (80 - 108)  RR: 32 (2022 09:00) (16 - 37)  SpO2: 99% (2022 09:00) (97% - 100%)      MEDICATIONS:  MEDICATIONS  (STANDING):  atorvastatin 10 milliGRAM(s) Oral at bedtime  cefTRIAXone Injectable. 1000 milliGRAM(s) IV Push every 24 hours  dextrose 40% Gel 15 Gram(s) Oral once  dextrose 5%. 1000 milliLiter(s) (100 mL/Hr) IV Continuous <Continuous>  dextrose 5%. 1000 milliLiter(s) (50 mL/Hr) IV Continuous <Continuous>  dextrose 50% Injectable 25 Gram(s) IV Push once  dextrose 50% Injectable 12.5 Gram(s) IV Push once  dextrose 50% Injectable 25 Gram(s) IV Push once  ferrous    sulfate 325 milliGRAM(s) Oral daily  glucagon  Injectable 1 milliGRAM(s) IntraMuscular once  glucagon  Injectable 1 milliGRAM(s) IntraMuscular once  heparin   Injectable 5000 Unit(s) SubCutaneous every 8 hours  insulin glargine Injectable (LANTUS) 8 Unit(s) SubCutaneous at bedtime  insulin lispro (ADMELOG) corrective regimen sliding scale   SubCutaneous Before meals and at bedtime  levothyroxine 50 MICROGram(s) Oral daily  melatonin 5 milliGRAM(s) Oral at bedtime  pantoprazole    Tablet 40 milliGRAM(s) Oral before breakfast  sodium bicarbonate 650 milliGRAM(s) Oral every 8 hours  tamsulosin 0.4 milliGRAM(s) Oral at bedtime    MEDICATIONS  (PRN):      ALLERGIES:  Allergies    No Known Allergies    Intolerances        LABS:                        8.7    6.12  )-----------( 167      ( 2022 05:41 )             26.3     02-07    134<L>  |  99  |  32<H>  ----------------------------<  166<H>  4.7   |  26  |  1.82<H>    Ca    9.3      2022 05:41  Phos  2.5     02-07  Mg     1.6     02-07    TPro  7.2  /  Alb  3.7  /  TBili  <0.2  /  DBili  x   /  AST  14  /  ALT  13  /  AlkPhos  67  02-07    PT/INR - ( 2022 22:07 )   PT: 12.7 sec;   INR: 1.06          PTT - ( 2022 22:07 )  PTT:25.1 sec  Urinalysis Basic - ( 2022 18:40 )    Color: Yellow / Appearance: Clear / S.020 / pH: x  Gluc: x / Ketone: NEGATIVE  / Bili: NEGATIVE / Urobili: 0.2 E.U./dL   Blood: x / Protein: >=300 mg/dL / Nitrite: NEGATIVE   Leuk Esterase: Large / RBC: > 10 /HPF / WBC Many /HPF   Sq Epi: x / Non Sq Epi: 0-5 /HPF / Bacteria: Many /HPF      CAPILLARY BLOOD GLUCOSE      POCT Blood Glucose.: 175 mg/dL (2022 05:23)      RADIOLOGY & ADDITIONAL TESTS: Reviewed. **********TRANSFER FROM MICU TO [facility]************    HOSPITAL COURSE: Mr Wilkerson is a 62M with PMH of HTN, DM c/b neuropathy, hypothyroid, BPH, EtOH liver cirrhosis, toxic megacolon c/b pneumatosis s/p colectomy/ileostomy, with multiple recent admissions (-2021, -2022, and -2022) for acute renal failure and hyperkalemia, the last 2 times requiring emergent dialysis, presenting from shelter for altered mental status x1 day. He was found to have hyperkalemia to 8.1 with associated EKG changes (peaked T waves) on admission; also found to be in acute renal failure with lactate of 3.0, hyponatremia and metabolic acidosis; also found to have sepsis 2/2 UTI; admitted to MICU for acute renal failure with hyperkalemia for possible emergent dialysis. CT head negative; CT chest/abd/pelvis with no acute pathology, cholelithiasis, trace pericardial effusion. Patient was given D5+insulin, bicarb, albuterol, calcium gluconate, versed, and started on vanc/zosyn. Patient was started on standing lokelma and bicarb drip. Renal consulted. Vanc/zosyn was d/vitaly and patient was started on ceftriaxone. On  Na overcorrected too fast to 134, was given 2mcg of DDAVP. Bicarb drip d/vitaly as acidosis resolved and Na at goal. Started on lantus 8 units (in addition to sliding scale) as fingersticks remaining high. 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 Patient now stable for stepdown to telemetry.    OVERNIGHT EVENTS: 9pm sodium 134, OK to check next in AM per nephro. dc'ed bicarb gtt.    SUBJECTIVE / INTERVAL HPI: Patient seen and examined at bedside. Endorsing 8/10 chronic b/l leg pain and lower back pain. Seems confused, asking for a phone that is not there and talking to the television. Reports his breathing is fine. Patient denying chest pain, SOB, palpitations, cough.   Remaining ROS negative       PHYSICAL EXAM:    General: sitting up in bed in NAD, calm, comfortable  Neurological: A&Ox2 (pt forgets year, w/intermittent confusion), lip smacking/shoulder shrugging resolved, no TD noted today. no focal deficits. confused,  HEENT: NC/AT, EOMI, PERRL, clear conjunctiva, no nasal or oropharyngeal discharge or exudates, MMM  Neck: supple, no cervical or post-auricular lymphadenopathy, no JVD  Cardiovascular: +S1/S2, no murmurs/rubs/gallops, RRR  Respiratory: CTA B/L, no diminished breath sounds, no wheezes/rales/rhonchi, no increased work of breathing or accessory muscle use, satting well on RA  Gastrointestinal: soft, NT/ND; active BSx4 quadrants. ostomy bag in place.  Genitourinary: no suprapubic tenderness, no CVA tenderness. soliz in place draining clear yellow urine.  Extremities: WWP; no edema, clubbing or cyanosis  Vascular: 2+ radial, DP pulses B/L  Skin: no rashes      VITAL SIGNS:  Vital Signs Last 24 Hrs  T(C): 37.1 (2022 09:55), Max: 37.1 (2022 17:00)  T(F): 98.7 (2022 09:55), Max: 98.8 (2022 17:00)  HR: 70 (2022 09:00) (61 - 76)  BP: 163/72 (2022 09:00) (120/58 - 163/72)  BP(mean): 103 (2022 09:00) (80 - 108)  RR: 32 (2022 09:00) (16 - 37)  SpO2: 99% (2022 09:00) (97% - 100%)      MEDICATIONS:  MEDICATIONS  (STANDING):  atorvastatin 10 milliGRAM(s) Oral at bedtime  cefTRIAXone Injectable. 1000 milliGRAM(s) IV Push every 24 hours  dextrose 40% Gel 15 Gram(s) Oral once  dextrose 5%. 1000 milliLiter(s) (100 mL/Hr) IV Continuous <Continuous>  dextrose 5%. 1000 milliLiter(s) (50 mL/Hr) IV Continuous <Continuous>  dextrose 50% Injectable 25 Gram(s) IV Push once  dextrose 50% Injectable 12.5 Gram(s) IV Push once  dextrose 50% Injectable 25 Gram(s) IV Push once  ferrous    sulfate 325 milliGRAM(s) Oral daily  glucagon  Injectable 1 milliGRAM(s) IntraMuscular once  glucagon  Injectable 1 milliGRAM(s) IntraMuscular once  heparin   Injectable 5000 Unit(s) SubCutaneous every 8 hours  insulin glargine Injectable (LANTUS) 8 Unit(s) SubCutaneous at bedtime  insulin lispro (ADMELOG) corrective regimen sliding scale   SubCutaneous Before meals and at bedtime  levothyroxine 50 MICROGram(s) Oral daily  melatonin 5 milliGRAM(s) Oral at bedtime  pantoprazole    Tablet 40 milliGRAM(s) Oral before breakfast  sodium bicarbonate 650 milliGRAM(s) Oral every 8 hours  tamsulosin 0.4 milliGRAM(s) Oral at bedtime    MEDICATIONS  (PRN):      ALLERGIES:  Allergies    No Known Allergies    Intolerances        LABS:                        8.7    6.12  )-----------( 167      ( 2022 05:41 )             26.3     02-07    134<L>  |  99  |  32<H>  ----------------------------<  166<H>  4.7   |  26  |  1.82<H>    Ca    9.3      2022 05:41  Phos  2.5     02-07  Mg     1.6     -07    TPro  7.2  /  Alb  3.7  /  TBili  <0.2  /  DBili  x   /  AST  14  /  ALT  13  /  AlkPhos  67  02-07    PT/INR - ( 2022 22:07 )   PT: 12.7 sec;   INR: 1.06          PTT - ( 2022 22:07 )  PTT:25.1 sec  Urinalysis Basic - ( 2022 18:40 )    Color: Yellow / Appearance: Clear / S.020 / pH: x  Gluc: x / Ketone: NEGATIVE  / Bili: NEGATIVE / Urobili: 0.2 E.U./dL   Blood: x / Protein: >=300 mg/dL / Nitrite: NEGATIVE   Leuk Esterase: Large / RBC: > 10 /HPF / WBC Many /HPF   Sq Epi: x / Non Sq Epi: 0-5 /HPF / Bacteria: Many /HPF      CAPILLARY BLOOD GLUCOSE      POCT Blood Glucose.: 175 mg/dL (2022 05:23)      RADIOLOGY & ADDITIONAL TESTS: Reviewed.

## 2022-02-07 NOTE — PROGRESS NOTE ADULT - PROBLEM SELECTOR PLAN 4
Patient with history of alcohol overuse and cirrhosis.  Currently not decompensated. Reports that last drink was 10 years ago. Ammonia level on admission wnl.  -continue to monitor

## 2022-02-07 NOTE — PROGRESS NOTE ADULT - SUBJECTIVE AND OBJECTIVE BOX
***TRANSFER FROM MICU TO [facility]***  HOSPITAL COURSE:  Mr Wilkerson is a 62M with PMH of HTN, DM c/b neuropathy, hypothyroid, BPH, EtOH liver cirrhosis, toxic megacolon c/b pneumatosis s/p colectomy/ileostomy, with multiple recent admissions (7/27-8/1/2021, 1/6-1/19/2022, and 1/24-1/30/2022) for acute renal failure and hyperkalemia, the last 2 times requiring emergent dialysis, presenting from shelter for altered mental status x1 day. He was found to have hyperkalemia to 8.1 with associated EKG changes (peaked T waves) on admission; also found to be in acute renal failure with lactate of 3.0, hyponatremia and metabolic acidosis; also found to have sepsis 2/2 UTI; admitted to MICU for acute renal failure with hyperkalemia for possible emergent dialysis. CT head negative; CT chest/abd/pelvis with no acute pathology, cholelithiasis, trace pericardial effusion. Patient was given D5+insulin, bicarb, albuterol, calcium gluconate, versed, and started on vanc/zosyn. Patient was started on standing lokelma and bicarb drip. Renal consulted. Vanc/zosyn was d/vitaly and patient was started on ceftriaxone. On 2/6 Na overcorrected too fast to 134, was given 2mcg of DDAVP. Bicarb drip d/vitaly as acidosis resolved and Na at goal. Started on lantus 8 units (in addition to sliding scale) as fingersticks remaining high. 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 Patient now stable for stepdown to telemetry.    SUBJECTIVE:  Patient seen and examined at bedside.    Vital Signs Last 12 Hrs  T(F): 98.1 (02-07-22 @ 22:20), Max: 98.2 (02-07-22 @ 17:51)  HR: 78 (02-07-22 @ 22:00) (66 - 82)  BP: 184/86 (02-07-22 @ 22:00) (174/77 - 209/84)  BP(mean): 123 (02-07-22 @ 22:00) (111 - 138)  RR: 17 (02-07-22 @ 22:00) (14 - 29)  SpO2: 99% (02-07-22 @ 22:00) (98% - 100%)  I&O's Summary    06 Feb 2022 07:01  -  07 Feb 2022 07:00  --------------------------------------------------------  IN: 1500 mL / OUT: 4040 mL / NET: -2540 mL    07 Feb 2022 07:01  -  07 Feb 2022 23:27  --------------------------------------------------------  IN: 400 mL / OUT: 2135 mL / NET: -1735 mL    PHYSICAL EXAM:  General: sitting up in bed in NAD, calm, comfortable  Neurological: A&Ox2 (pt forgets year, w/intermittent confusion), lip smacking/shoulder shrugging resolved, no TD noted today. no focal deficits. confused,  HEENT: NC/AT, EOMI, PERRL, clear conjunctiva, no nasal or oropharyngeal discharge or exudates, MMM  Neck: supple, no cervical or post-auricular lymphadenopathy, no JVD  Cardiovascular: +S1/S2, no murmurs/rubs/gallops, RRR  Respiratory: CTA B/L, no diminished breath sounds, no wheezes/rales/rhonchi, no increased work of breathing or accessory muscle use, satting well on RA  Gastrointestinal: soft, NT/ND; active BSx4 quadrants. ostomy bag in place.  Genitourinary: no suprapubic tenderness, no CVA tenderness. soliz in place draining clear yellow urine.  Extremities: WWP; no edema, clubbing or cyanosis  Vascular: 2+ radial, DP pulses B/L  Skin: no rashes    LABS:                        8.7    6.12  )-----------( 167      ( 07 Feb 2022 05:41 )             26.3     02-07    134<L>  |  99  |  32<H>  ----------------------------<  166<H>  4.7   |  26  |  1.82<H>    Ca    9.3      07 Feb 2022 05:41  Phos  2.5     02-07  Mg     1.6     02-07    TPro  7.2  /  Alb  3.7  /  TBili  <0.2  /  DBili  x   /  AST  14  /  ALT  13  /  AlkPhos  67  02-07    RADIOLOGY & ADDITIONAL TESTS:    MEDICATIONS  (STANDING):  amLODIPine   Tablet 10 milliGRAM(s) Oral daily  atorvastatin 10 milliGRAM(s) Oral at bedtime  cefTRIAXone Injectable. 1000 milliGRAM(s) IV Push every 24 hours  dextrose 40% Gel 15 Gram(s) Oral once  dextrose 5%. 1000 milliLiter(s) (100 mL/Hr) IV Continuous <Continuous>  dextrose 5%. 1000 milliLiter(s) (50 mL/Hr) IV Continuous <Continuous>  dextrose 50% Injectable 25 Gram(s) IV Push once  dextrose 50% Injectable 12.5 Gram(s) IV Push once  dextrose 50% Injectable 25 Gram(s) IV Push once  ferrous    sulfate 325 milliGRAM(s) Oral daily  glucagon  Injectable 1 milliGRAM(s) IntraMuscular once  glucagon  Injectable 1 milliGRAM(s) IntraMuscular once  heparin   Injectable 5000 Unit(s) SubCutaneous every 8 hours  hydrALAZINE 75 milliGRAM(s) Oral every 8 hours  insulin glargine Injectable (LANTUS) 8 Unit(s) SubCutaneous at bedtime  insulin lispro (ADMELOG) corrective regimen sliding scale   SubCutaneous Before meals and at bedtime  levothyroxine 50 MICROGram(s) Oral daily  melatonin 5 milliGRAM(s) Oral at bedtime  pantoprazole    Tablet 40 milliGRAM(s) Oral before breakfast  sodium bicarbonate 650 milliGRAM(s) Oral every 8 hours  tamsulosin 0.4 milliGRAM(s) Oral at bedtime    MEDICATIONS  (PRN):   ***Acceptance from MICU > [facility]***  HOSPITAL COURSE:  Mr Wilkerson is a 62M with PMH of HTN, DM c/b neuropathy, hypothyroid, BPH, EtOH liver cirrhosis, toxic megacolon c/b pneumatosis s/p colectomy/ileostomy, with multiple recent admissions (7/27-8/1/2021, 1/6-1/19/2022, and 1/24-1/30/2022) for acute renal failure and hyperkalemia, the last 2 times requiring emergent dialysis, presenting from shelter for altered mental status x1 day. He was found to have hyperkalemia to 8.1 with associated EKG changes (peaked T waves) on admission; also found to be in acute renal failure with lactate of 3.0, hyponatremia and metabolic acidosis; also found to have sepsis 2/2 UTI; admitted to MICU for acute renal failure with hyperkalemia for possible emergent dialysis. CT head negative; CT chest/abd/pelvis with no acute pathology, cholelithiasis, trace pericardial effusion. Patient was given D5+insulin, bicarb, albuterol, calcium gluconate, versed, and started on vanc/zosyn. Patient was started on standing lokelma and bicarb drip. Renal consulted. Vanc/zosyn was d/vitaly and patient was started on ceftriaxone. On 2/6 Na overcorrected too fast to 134, was given 2mcg of DDAVP. Bicarb drip d/vitaly as acidosis resolved and Na at goal. Started on lantus 8 units (in addition to sliding scale) as fingersticks remaining high. 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 Patient now stable for stepdown to telemetry.    SUBJECTIVE:  Patient seen and examined at bedside.    Vital Signs Last 12 Hrs  T(F): 98.1 (02-07-22 @ 22:20), Max: 98.2 (02-07-22 @ 17:51)  HR: 78 (02-07-22 @ 22:00) (66 - 82)  BP: 184/86 (02-07-22 @ 22:00) (174/77 - 209/84)  BP(mean): 123 (02-07-22 @ 22:00) (111 - 138)  RR: 17 (02-07-22 @ 22:00) (14 - 29)  SpO2: 99% (02-07-22 @ 22:00) (98% - 100%)  I&O's Summary    06 Feb 2022 07:01  -  07 Feb 2022 07:00  --------------------------------------------------------  IN: 1500 mL / OUT: 4040 mL / NET: -2540 mL    07 Feb 2022 07:01  -  07 Feb 2022 23:27  --------------------------------------------------------  IN: 400 mL / OUT: 2135 mL / NET: -1735 mL    PHYSICAL EXAM:  General: sitting up in bed in NAD, calm, comfortable  Neurological: A&Ox2 (pt forgets year, w/intermittent confusion), lip smacking/shoulder shrugging resolved, no TD noted today. no focal deficits. confused,  HEENT: NC/AT, EOMI, PERRL, clear conjunctiva, no nasal or oropharyngeal discharge or exudates, MMM  Neck: supple, no cervical or post-auricular lymphadenopathy, no JVD  Cardiovascular: +S1/S2, no murmurs/rubs/gallops, RRR  Respiratory: CTA B/L, no diminished breath sounds, no wheezes/rales/rhonchi, no increased work of breathing or accessory muscle use, satting well on RA  Gastrointestinal: soft, NT/ND; active BSx4 quadrants. ostomy bag in place.  Genitourinary: no suprapubic tenderness, no CVA tenderness. soliz in place draining clear yellow urine.  Extremities: WWP; no edema, clubbing or cyanosis  Vascular: 2+ radial, DP pulses B/L  Skin: no rashes    LABS:                        8.7    6.12  )-----------( 167      ( 07 Feb 2022 05:41 )             26.3     02-07    134<L>  |  99  |  32<H>  ----------------------------<  166<H>  4.7   |  26  |  1.82<H>    Ca    9.3      07 Feb 2022 05:41  Phos  2.5     02-07  Mg     1.6     02-07    TPro  7.2  /  Alb  3.7  /  TBili  <0.2  /  DBili  x   /  AST  14  /  ALT  13  /  AlkPhos  67  02-07    RADIOLOGY & ADDITIONAL TESTS:    MEDICATIONS  (STANDING):  amLODIPine   Tablet 10 milliGRAM(s) Oral daily  atorvastatin 10 milliGRAM(s) Oral at bedtime  cefTRIAXone Injectable. 1000 milliGRAM(s) IV Push every 24 hours  dextrose 40% Gel 15 Gram(s) Oral once  dextrose 5%. 1000 milliLiter(s) (100 mL/Hr) IV Continuous <Continuous>  dextrose 5%. 1000 milliLiter(s) (50 mL/Hr) IV Continuous <Continuous>  dextrose 50% Injectable 25 Gram(s) IV Push once  dextrose 50% Injectable 12.5 Gram(s) IV Push once  dextrose 50% Injectable 25 Gram(s) IV Push once  ferrous    sulfate 325 milliGRAM(s) Oral daily  glucagon  Injectable 1 milliGRAM(s) IntraMuscular once  glucagon  Injectable 1 milliGRAM(s) IntraMuscular once  heparin   Injectable 5000 Unit(s) SubCutaneous every 8 hours  hydrALAZINE 75 milliGRAM(s) Oral every 8 hours  insulin glargine Injectable (LANTUS) 8 Unit(s) SubCutaneous at bedtime  insulin lispro (ADMELOG) corrective regimen sliding scale   SubCutaneous Before meals and at bedtime  levothyroxine 50 MICROGram(s) Oral daily  melatonin 5 milliGRAM(s) Oral at bedtime  pantoprazole    Tablet 40 milliGRAM(s) Oral before breakfast  sodium bicarbonate 650 milliGRAM(s) Oral every 8 hours  tamsulosin 0.4 milliGRAM(s) Oral at bedtime    MEDICATIONS  (PRN):

## 2022-02-07 NOTE — PROGRESS NOTE ADULT - ASSESSMENT
Mr Rock is a 62M with HTN, DM c/b neuropathy, hypothyroid, BPH, EtOH liver cirrhosis, toxic megacolon c/b pneumatosis s/p colectomy/ileostomy, with multiple recent admissions (7/27-8/1/2021, 1/6-1/19/2022, and 1/24-1/30/2022) for acute renal failure and hyperkalemia, the last 2 times requiring emergent dialysis, presenting from shelter for altered mental status x1 day. Admitted to MICU for ARF and hyperkalemia w/EKG changes; now resolved and stable for step down to telemetry.     NEUROLOGIC  #Metabolic encephalopathy. A&O x2-3  Presented with AMS x 1 day. CT head wnl. Utox negative. Alcohol and ammonia levels wnl. Likely 2/2 sepsis from UTI.  -c/w sepsis management as below  -c/w renal failure tx as below  -hold home Trazodone 100mg daily iso AMS    CARDIOLOGY  #Hypertension, home meds: hydrALAZINE 50mg q8hrs, norvasc 10mg qd  -restarted home norvasc as BPs consistently high  -holding home hydralazine, restart as appropriate     #HLD  -c/w home atorvastatin 10mg qhs    RESPIRATORY  -no active issues, satting well on RA    GASTROINTESTINAL  #Cirrhosis  Patient with history of alcohol overuse and cirrhosis.  Currently not decompensated. Reports that last drink was 10 years ago. Ammonia level on admission wnl.  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 Hyperkalemia resolved without dialysis this admission. Acidosis now resolved. CT abd/pelvis without obvious hydronephrosis or stone. Presenting this admission with sepsis 2/2 UTI.  -Baseline Cr 1-1.2  -continue to trend BMP, Mg, Phos q12  -repeat hyperkalemic treatment as needed however K wnl this am  -Cr slowly downtrending, 1.82 today   -s/p Lokelma 10g q8hr x 24hrs  -d/c bicarb drip, start PO bicarb tabs 650mg q8  -avoid nephrotoxic medications and renally dose medications  -strict I/Os; goal keep net even (can give IV NS if needed)  -renal following, recs appreciated   -OSH med rec w/daily bicarb tabs- consider increasing dose when discharged  -f/u AM cortisol to eval for type 4 RTA     #Hyponatremia  -Na 121 on admission, increased to 127 after 2.5L NS bolus in the ED. Na improved to 134 this AM  -s/p D5 and bicarb drips   -s/p 1 dose DDAVP yesterday for overcorrection   -Na 134 today, correcting well, holding further fluids for now as at goal  -continue to trend BMP q12 to monitor   -hold home Duloxetine  -renal following, appreciate recs     # BPH  -c/w home tamsulosin 0.4mg qhs    ID  #Sepsis 2/2 UTI  Hypothermic w/WBC 22 on admission. Lactate 3 on admission now resolved. CT head, chest and abd/pelvis wnl. UA positive. Purulent urine in [place]. Severe sepsis likely 2/2 UTI. Hx of klebsiella in urine 1/6/22. Normothermic and HDS now.  -BCs 2/6 NGTD  -c/w ceftriaxone 1g q24 x 3 days   -f/u urine cultures, deescalate w/sensitivities    ENDOCRINE  #DM II, home meds: Janumet 50 mg-500 mg BID. Gabapentin for peripheral neuropathy discontinued last admission.  A1C 6.7  -hold oral diabetes medications  -monitor FSG   -c/w mISS  -started on lantus 8 units nightly as sugars remaining elevated; consider increasing to 10-13 units if sugars persistently elevated     #Hypothyroidism  -c/w home synthroid 50mcg qd    HEME  #Anemia, normocytic   -no signs of bleeding on exam  -B12 & folate wnl 1/22  -iron studies c/w AOCD  -c/w ferrous sulfate 325 daily for diarrhea   -continue to trend; transfuse for hgb <7  -maintain active T&S    MISC  F: none  E: caution with repletion however replete as absolutely necessary   N: CC: Soft and bite sized diet  DVT ppx: heparin subq  GI ppx: home PPI  Code Status: full code  Dispo:  [facility]

## 2022-02-07 NOTE — PROGRESS NOTE ADULT - ASSESSMENT
Admitted with severe MEG, hyponatremia, encephalopathy, found to have urine infection, CT with b/l perinephric stranding possible pyelonephritis, mild L kidney atrophy and L adrenal gland thickening, on Ceftriaxone. Hyponatremia improving, at goal of 134 today. s/p DDAVP yesterday afternoon.  MEG may have been perfusional/prerenal - improving rapidly, non oliguric. Baseline Cr 1.1-1.3 CKD III

## 2022-02-07 NOTE — PROGRESS NOTE ADULT - PROBLEM SELECTOR PLAN 1
A&O x2-3  Presented with AMS x 1 day. CT head wnl. Utox negative. Alcohol and ammonia levels wnl. Likely 2/2 sepsis from UTI.  -c/w sepsis management as below  -c/w renal failure tx as below  -hold home Trazodone 100mg daily iso AMS

## 2022-02-07 NOTE — PROGRESS NOTE ADULT - PROBLEM SELECTOR PLAN 9
Hypothermic w/WBC 22 on admission. Lactate 3 on admission now resolved. CT head, chest and abd/pelvis wnl. UA positive. Purulent urine in [place]. Severe sepsis likely 2/2 UTI. Hx of klebsiella in urine 1/6/22. Normothermic and HDS now.  -BCs 2/6 NGTD  -c/w ceftriaxone 1g q24 x 3 days   -f/u urine cultures, deescalate w/sensitivities

## 2022-02-08 LAB
-  AMIKACIN: SIGNIFICANT CHANGE UP
-  AMOXICILLIN/CLAVULANIC ACID: SIGNIFICANT CHANGE UP
-  AMPICILLIN/SULBACTAM: SIGNIFICANT CHANGE UP
-  AMPICILLIN: SIGNIFICANT CHANGE UP
-  AZTREONAM: SIGNIFICANT CHANGE UP
-  CEFAZOLIN: SIGNIFICANT CHANGE UP
-  CEFEPIME: SIGNIFICANT CHANGE UP
-  CEFOXITIN: SIGNIFICANT CHANGE UP
-  CEFTRIAXONE: SIGNIFICANT CHANGE UP
-  CIPROFLOXACIN: SIGNIFICANT CHANGE UP
-  ERTAPENEM: SIGNIFICANT CHANGE UP
-  GENTAMICIN: SIGNIFICANT CHANGE UP
-  IMIPENEM: SIGNIFICANT CHANGE UP
-  LEVOFLOXACIN: SIGNIFICANT CHANGE UP
-  MEROPENEM: SIGNIFICANT CHANGE UP
-  NITROFURANTOIN: SIGNIFICANT CHANGE UP
-  PIPERACILLIN/TAZOBACTAM: SIGNIFICANT CHANGE UP
-  TIGECYCLINE: SIGNIFICANT CHANGE UP
-  TOBRAMYCIN: SIGNIFICANT CHANGE UP
-  TRIMETHOPRIM/SULFAMETHOXAZOLE: SIGNIFICANT CHANGE UP
ALBUMIN SERPL ELPH-MCNC: 4.2 G/DL — SIGNIFICANT CHANGE UP (ref 3.3–5)
ALP SERPL-CCNC: 81 U/L — SIGNIFICANT CHANGE UP (ref 40–120)
ALT FLD-CCNC: 11 U/L — SIGNIFICANT CHANGE UP (ref 10–45)
ANION GAP SERPL CALC-SCNC: 14 MMOL/L — SIGNIFICANT CHANGE UP (ref 5–17)
AST SERPL-CCNC: 12 U/L — SIGNIFICANT CHANGE UP (ref 10–40)
BASOPHILS # BLD AUTO: 0.03 K/UL — SIGNIFICANT CHANGE UP (ref 0–0.2)
BASOPHILS NFR BLD AUTO: 0.5 % — SIGNIFICANT CHANGE UP (ref 0–2)
BILIRUB SERPL-MCNC: 0.2 MG/DL — SIGNIFICANT CHANGE UP (ref 0.2–1.2)
BUN SERPL-MCNC: 23 MG/DL — SIGNIFICANT CHANGE UP (ref 7–23)
CALCIUM SERPL-MCNC: 9.6 MG/DL — SIGNIFICANT CHANGE UP (ref 8.4–10.5)
CHLORIDE SERPL-SCNC: 99 MMOL/L — SIGNIFICANT CHANGE UP (ref 96–108)
CO2 SERPL-SCNC: 24 MMOL/L — SIGNIFICANT CHANGE UP (ref 22–31)
CREAT SERPL-MCNC: 1.24 MG/DL — SIGNIFICANT CHANGE UP (ref 0.5–1.3)
CULTURE RESULTS: SIGNIFICANT CHANGE UP
EOSINOPHIL # BLD AUTO: 0.07 K/UL — SIGNIFICANT CHANGE UP (ref 0–0.5)
EOSINOPHIL NFR BLD AUTO: 1.1 % — SIGNIFICANT CHANGE UP (ref 0–6)
GLUCOSE BLDC GLUCOMTR-MCNC: 184 MG/DL — HIGH (ref 70–99)
GLUCOSE BLDC GLUCOMTR-MCNC: 199 MG/DL — HIGH (ref 70–99)
GLUCOSE BLDC GLUCOMTR-MCNC: 213 MG/DL — HIGH (ref 70–99)
GLUCOSE BLDC GLUCOMTR-MCNC: 242 MG/DL — HIGH (ref 70–99)
GLUCOSE SERPL-MCNC: 173 MG/DL — HIGH (ref 70–99)
HCT VFR BLD CALC: 30.5 % — LOW (ref 39–50)
HGB BLD-MCNC: 10.1 G/DL — LOW (ref 13–17)
IMM GRANULOCYTES NFR BLD AUTO: 0.5 % — SIGNIFICANT CHANGE UP (ref 0–1.5)
INR BLD: 1.02 — SIGNIFICANT CHANGE UP (ref 0.88–1.16)
LYMPHOCYTES # BLD AUTO: 1.43 K/UL — SIGNIFICANT CHANGE UP (ref 1–3.3)
LYMPHOCYTES # BLD AUTO: 21.8 % — SIGNIFICANT CHANGE UP (ref 13–44)
MAGNESIUM SERPL-MCNC: 1.8 MG/DL — SIGNIFICANT CHANGE UP (ref 1.6–2.6)
MCHC RBC-ENTMCNC: 30.1 PG — SIGNIFICANT CHANGE UP (ref 27–34)
MCHC RBC-ENTMCNC: 33.1 GM/DL — SIGNIFICANT CHANGE UP (ref 32–36)
MCV RBC AUTO: 91 FL — SIGNIFICANT CHANGE UP (ref 80–100)
MELD SCORE WITH DIALYSIS: 20 POINTS — SIGNIFICANT CHANGE UP
MELD SCORE WITHOUT DIALYSIS: 9 POINTS — SIGNIFICANT CHANGE UP
METHOD TYPE: SIGNIFICANT CHANGE UP
MONOCYTES # BLD AUTO: 0.79 K/UL — SIGNIFICANT CHANGE UP (ref 0–0.9)
MONOCYTES NFR BLD AUTO: 12 % — SIGNIFICANT CHANGE UP (ref 2–14)
NEUTROPHILS # BLD AUTO: 4.21 K/UL — SIGNIFICANT CHANGE UP (ref 1.8–7.4)
NEUTROPHILS NFR BLD AUTO: 64.1 % — SIGNIFICANT CHANGE UP (ref 43–77)
NRBC # BLD: 0 /100 WBCS — SIGNIFICANT CHANGE UP (ref 0–0)
ORGANISM # SPEC MICROSCOPIC CNT: SIGNIFICANT CHANGE UP
ORGANISM # SPEC MICROSCOPIC CNT: SIGNIFICANT CHANGE UP
PHOSPHATE SERPL-MCNC: 3 MG/DL — SIGNIFICANT CHANGE UP (ref 2.5–4.5)
PLATELET # BLD AUTO: 178 K/UL — SIGNIFICANT CHANGE UP (ref 150–400)
POTASSIUM SERPL-MCNC: 4.5 MMOL/L — SIGNIFICANT CHANGE UP (ref 3.5–5.3)
POTASSIUM SERPL-SCNC: 4.5 MMOL/L — SIGNIFICANT CHANGE UP (ref 3.5–5.3)
PROT SERPL-MCNC: 8.1 G/DL — SIGNIFICANT CHANGE UP (ref 6–8.3)
PROTHROM AB SERPL-ACNC: 12.2 SEC — SIGNIFICANT CHANGE UP (ref 10.6–13.6)
RBC # BLD: 3.35 M/UL — LOW (ref 4.2–5.8)
RBC # FLD: 14.5 % — SIGNIFICANT CHANGE UP (ref 10.3–14.5)
SODIUM SERPL-SCNC: 137 MMOL/L — SIGNIFICANT CHANGE UP (ref 135–145)
SPECIMEN SOURCE: SIGNIFICANT CHANGE UP
WBC # BLD: 6.56 K/UL — SIGNIFICANT CHANGE UP (ref 3.8–10.5)
WBC # FLD AUTO: 6.56 K/UL — SIGNIFICANT CHANGE UP (ref 3.8–10.5)

## 2022-02-08 PROCEDURE — 99233 SBSQ HOSP IP/OBS HIGH 50: CPT

## 2022-02-08 PROCEDURE — 99232 SBSQ HOSP IP/OBS MODERATE 35: CPT

## 2022-02-08 PROCEDURE — 99232 SBSQ HOSP IP/OBS MODERATE 35: CPT | Mod: GC

## 2022-02-08 RX ORDER — CEFTRIAXONE 500 MG/1
1000 INJECTION, POWDER, FOR SOLUTION INTRAMUSCULAR; INTRAVENOUS EVERY 24 HOURS
Refills: 0 | Status: COMPLETED | OUTPATIENT
Start: 2022-02-09 | End: 2022-02-12

## 2022-02-08 RX ORDER — CIPROFLOXACIN LACTATE 400MG/40ML
500 VIAL (ML) INTRAVENOUS EVERY 12 HOURS
Refills: 0 | Status: DISCONTINUED | OUTPATIENT
Start: 2022-02-08 | End: 2022-02-08

## 2022-02-08 RX ORDER — HYDRALAZINE HCL 50 MG
10 TABLET ORAL ONCE
Refills: 0 | Status: COMPLETED | OUTPATIENT
Start: 2022-02-08 | End: 2022-02-08

## 2022-02-08 RX ORDER — INSULIN LISPRO 100/ML
2 VIAL (ML) SUBCUTANEOUS
Refills: 0 | Status: DISCONTINUED | OUTPATIENT
Start: 2022-02-08 | End: 2022-02-09

## 2022-02-08 RX ADMIN — Medication 75 MILLIGRAM(S): at 06:11

## 2022-02-08 RX ADMIN — Medication 5 MILLIGRAM(S): at 21:45

## 2022-02-08 RX ADMIN — Medication 4: at 18:05

## 2022-02-08 RX ADMIN — Medication 2 UNIT(S): at 18:07

## 2022-02-08 RX ADMIN — PANTOPRAZOLE SODIUM 40 MILLIGRAM(S): 20 TABLET, DELAYED RELEASE ORAL at 08:12

## 2022-02-08 RX ADMIN — Medication 2: at 11:46

## 2022-02-08 RX ADMIN — CEFTRIAXONE 1000 MILLIGRAM(S): 500 INJECTION, POWDER, FOR SOLUTION INTRAMUSCULAR; INTRAVENOUS at 11:14

## 2022-02-08 RX ADMIN — Medication 75 MILLIGRAM(S): at 21:44

## 2022-02-08 RX ADMIN — Medication 325 MILLIGRAM(S): at 11:14

## 2022-02-08 RX ADMIN — Medication 650 MILLIGRAM(S): at 06:11

## 2022-02-08 RX ADMIN — Medication 2: at 09:15

## 2022-02-08 RX ADMIN — INSULIN GLARGINE 8 UNIT(S): 100 INJECTION, SOLUTION SUBCUTANEOUS at 21:45

## 2022-02-08 RX ADMIN — HEPARIN SODIUM 5000 UNIT(S): 5000 INJECTION INTRAVENOUS; SUBCUTANEOUS at 21:45

## 2022-02-08 RX ADMIN — Medication 10 MILLIGRAM(S): at 18:57

## 2022-02-08 RX ADMIN — HEPARIN SODIUM 5000 UNIT(S): 5000 INJECTION INTRAVENOUS; SUBCUTANEOUS at 13:35

## 2022-02-08 RX ADMIN — Medication 50 MICROGRAM(S): at 06:11

## 2022-02-08 RX ADMIN — Medication 650 MILLIGRAM(S): at 13:34

## 2022-02-08 RX ADMIN — ATORVASTATIN CALCIUM 10 MILLIGRAM(S): 80 TABLET, FILM COATED ORAL at 21:47

## 2022-02-08 RX ADMIN — HEPARIN SODIUM 5000 UNIT(S): 5000 INJECTION INTRAVENOUS; SUBCUTANEOUS at 06:11

## 2022-02-08 RX ADMIN — AMLODIPINE BESYLATE 10 MILLIGRAM(S): 2.5 TABLET ORAL at 06:11

## 2022-02-08 RX ADMIN — Medication 4: at 21:47

## 2022-02-08 RX ADMIN — TAMSULOSIN HYDROCHLORIDE 0.4 MILLIGRAM(S): 0.4 CAPSULE ORAL at 21:45

## 2022-02-08 RX ADMIN — Medication 650 MILLIGRAM(S): at 21:44

## 2022-02-08 RX ADMIN — Medication 75 MILLIGRAM(S): at 13:35

## 2022-02-08 NOTE — PROGRESS NOTE ADULT - PROBLEM SELECTOR PLAN 9
Hypothermic w/WBC 22 on admission. Lactate 3 on admission now resolved. CT head, chest and abd/pelvis wnl. UA positive. Purulent urine in [place]. Severe sepsis likely 2/2 UTI. Hx of klebsiella in urine 1/6/22. Normothermic and HDS now.  -BCxs 2/6 NGTD  -c/w ceftriaxone 1g q24 x 5-7 days ( can d/c on cefpodoxime)  -f/u urine cultures, deescalate w/sensitivities if indicated

## 2022-02-08 NOTE — PHYSICAL THERAPY INITIAL EVALUATION ADULT - TRANSFER SAFETY CONCERNS NOTED: SIT/STAND, REHAB EVAL
Demo impulsivity and poor safety awareness, with poor eccentric control during descend and unsteadiness upon standing/decreased safety awareness/decreased weight-shifting ability

## 2022-02-08 NOTE — PHYSICAL THERAPY INITIAL EVALUATION ADULT - BED MOBILITY LIMITATIONS, REHAB EVAL
Demo fair strength with bed mobility, benefiting from PT assist for BLE advancement and mod verbal cues for redirection to task at hand/decreased ability to use arms for pushing/pulling/decreased ability to use legs for bridging/pushing/impaired ability to control trunk for mobility

## 2022-02-08 NOTE — PROGRESS NOTE ADULT - PROBLEM SELECTOR PLAN 2
home meds: hydrALAZINE 50mg q8hrs, norvasc 10mg qd  -restarted home norvasc 10mg  - increased home hydralazine from 50 to 75mg q8hrs as BPs consistently high

## 2022-02-08 NOTE — PHYSICAL THERAPY INITIAL EVALUATION ADULT - ADDITIONAL COMMENTS
Patient is a community ambulator living in a shelter, previously independent with all ADLs and ambulating with a RW/rollator.

## 2022-02-08 NOTE — PROGRESS NOTE ADULT - PROBLEM SELECTOR PLAN 7
-Na 121 on admission, increased to 127 after 2.5L NS bolus in the ED. Na improved to 134 this AM  -s/p D5 and bicarb drips   -s/p 1 dose DDAVP yesterday for overcorrection   -Na 137 today, correcting well, holding further fluids for now as at goal  -continue to trend BMP q12 to monitor   -hold home Duloxetine  -renal following, appreciate recs

## 2022-02-08 NOTE — PROGRESS NOTE ADULT - PROBLEM SELECTOR PLAN 7
Na 121 on admission, increased to 127 after 2.5L NS bolus in the ED. Na improved to 134 this AM. S/p D5 and bicarb drips. S/p 1 dose DDAVP yesterday for overcorrection   -Na 137 today, correcting well, holding further fluids for now as at goal  -continue to trend BMP q12 to monitor   -hold home Duloxetine  -renal following, appreciate recs Na 121 on admission, increased to 127 after 2.5L NS bolus in the ED. Na improved to 134 this AM. S/p D5 and bicarb drips. S/p 1 dose DDAVP yesterday for overcorrection   -Na 137 today, correcting well, holding further fluids for now as at goal  -hold home Duloxetine  -renal following, appreciate recs

## 2022-02-08 NOTE — PHYSICAL THERAPY INITIAL EVALUATION ADULT - GAIT DEVIATIONS NOTED, PT EVAL
Demo unsteady gait with poor navigation of turns and room obstacles, benefiting from PT assist to prevent LOB and mod verbal cues for redirection to task and proper sequencing/decreased kat/increased time in double stance/decreased step length/decreased stride length/increased stride width/decreased weight-shifting ability

## 2022-02-08 NOTE — PROGRESS NOTE ADULT - PROBLEM SELECTOR PLAN 10
home meds: Janumet 50 mg-500 mg BID. Gabapentin for peripheral neuropathy discontinued last admission.  A1C 6.7  -hold oral diabetes medications  -monitor FSG   -c/w mISS  -started on lantus 8 units nightly as sugars remaining elevated; consider increasing to 10-13 units if sugars persistently elevated     #Hypothyroidism  -c/w home synthroid 50mcg qd home meds: Janumet 50 mg-500 mg BID. Gabapentin for peripheral neuropathy discontinued last admission.  A1C 6.7  -hold oral diabetes medications  -c/w mISS  -c/w lispro 2, lantus 8     #Hypothyroidism  -c/w home synthroid 50mcg qd

## 2022-02-08 NOTE — PROGRESS NOTE ADULT - PROBLEM SELECTOR PLAN 2
home meds: hydrALAZINE 50mg q8hrs, norvasc 10mg qd  -restarted home norvasc 10mg  - increased home hydralazine (2/7) from 50 to 75mg q8hrs as BPs consistently high home meds: hydrALAZINE 50mg q8hrs, norvasc 10mg qd  - c/w norvasc 10mg  - c/w hydralazine 75mg q8hr

## 2022-02-08 NOTE — PROGRESS NOTE ADULT - SUBJECTIVE AND OBJECTIVE BOX
INTERVAL HPI/OVERNIGHT EVENTS:  As per night team, no overnight events. Patient seen and examined at bedside. Pt reports feeling well, hesitates to answer questions but A&Ox2-3 (self, place, year but not month). Patient denies fever, chills, dizziness, weakness, HA, CP, SOB, N/V/D/C      VITALS  Vital Signs Last 24 Hrs  T(C): 36.9 (08 Feb 2022 14:00), Max: 36.9 (08 Feb 2022 01:49)  T(F): 98.4 (08 Feb 2022 14:00), Max: 98.5 (08 Feb 2022 01:49)  HR: 96 (08 Feb 2022 16:25) (74 - 96)  BP: 190/86 (08 Feb 2022 16:25) (166/78 - 205/98)  BP(mean): 124 (08 Feb 2022 16:25) (111 - 138)  RR: 18 (08 Feb 2022 16:25) (14 - 28)  SpO2: 98% (08 Feb 2022 16:25) (97% - 100%)    CAPILLARY BLOOD GLUCOSE      POCT Blood Glucose.: 199 mg/dL (08 Feb 2022 11:38)  POCT Blood Glucose.: 184 mg/dL (08 Feb 2022 08:24)  POCT Blood Glucose.: 190 mg/dL (07 Feb 2022 21:36)      PHYSICAL EXAM  General: NAD  HEENT: MMM  Neck: supple  Cardiovascular: +S1/S2; RRR  Respiratory: CTA B/L; no W/R/R  Gastrointestinal: ileostomy/colostomy bag  Extremities: WWP; no edema, clubbing or cyanosis  Vascular: 2+ radial, DP/PT pulses B/L  Neurological: AAOx2; no focal deficits    MEDICATIONS  (STANDING):  amLODIPine   Tablet 10 milliGRAM(s) Oral daily  atorvastatin 10 milliGRAM(s) Oral at bedtime  dextrose 40% Gel 15 Gram(s) Oral once  dextrose 5%. 1000 milliLiter(s) (100 mL/Hr) IV Continuous <Continuous>  dextrose 5%. 1000 milliLiter(s) (50 mL/Hr) IV Continuous <Continuous>  dextrose 50% Injectable 25 Gram(s) IV Push once  dextrose 50% Injectable 12.5 Gram(s) IV Push once  dextrose 50% Injectable 25 Gram(s) IV Push once  ferrous    sulfate 325 milliGRAM(s) Oral daily  glucagon  Injectable 1 milliGRAM(s) IntraMuscular once  glucagon  Injectable 1 milliGRAM(s) IntraMuscular once  heparin   Injectable 5000 Unit(s) SubCutaneous every 8 hours  hydrALAZINE 75 milliGRAM(s) Oral every 8 hours  insulin glargine Injectable (LANTUS) 8 Unit(s) SubCutaneous at bedtime  insulin lispro (ADMELOG) corrective regimen sliding scale   SubCutaneous Before meals and at bedtime  insulin lispro Injectable (ADMELOG) 2 Unit(s) SubCutaneous three times a day before meals  levothyroxine 50 MICROGram(s) Oral daily  melatonin 5 milliGRAM(s) Oral at bedtime  pantoprazole    Tablet 40 milliGRAM(s) Oral before breakfast  sodium bicarbonate 650 milliGRAM(s) Oral every 8 hours  tamsulosin 0.4 milliGRAM(s) Oral at bedtime    MEDICATIONS  (PRN):      No Known Allergies      LABS                        10.1   6.56  )-----------( 178      ( 08 Feb 2022 06:09 )             30.5     02-08    137  |  99  |  23  ----------------------------<  173<H>  4.5   |  24  |  1.24    Ca    9.6      08 Feb 2022 06:09  Phos  3.0     02-08  Mg     1.8     02-08    TPro  8.1  /  Alb  4.2  /  TBili  0.2  /  DBili  x   /  AST  12  /  ALT  11  /  AlkPhos  81  02-08    PT/INR - ( 08 Feb 2022 06:09 )   PT: 12.2 sec;   INR: 1.02           ACCEPTANCE FROM [facility] TO [facility]  INTERVAL HPI/OVERNIGHT EVENTS:  As per night team, no overnight events. Patient seen and examined at bedside. Pt reports feeling well, hesitates to answer questions but A&Ox2-3 (self, place, year but not month). Patient denies fever, chills, dizziness, weakness, HA, CP, SOB, N/V/D/C      VITALS  Vital Signs Last 24 Hrs  T(C): 36.9 (08 Feb 2022 14:00), Max: 36.9 (08 Feb 2022 01:49)  T(F): 98.4 (08 Feb 2022 14:00), Max: 98.5 (08 Feb 2022 01:49)  HR: 96 (08 Feb 2022 16:25) (74 - 96)  BP: 190/86 (08 Feb 2022 16:25) (166/78 - 205/98)  BP(mean): 124 (08 Feb 2022 16:25) (111 - 138)  RR: 18 (08 Feb 2022 16:25) (14 - 28)  SpO2: 98% (08 Feb 2022 16:25) (97% - 100%)    CAPILLARY BLOOD GLUCOSE      POCT Blood Glucose.: 199 mg/dL (08 Feb 2022 11:38)  POCT Blood Glucose.: 184 mg/dL (08 Feb 2022 08:24)  POCT Blood Glucose.: 190 mg/dL (07 Feb 2022 21:36)      PHYSICAL EXAM  General: NAD  HEENT: MMM  Neck: supple  Cardiovascular: +S1/S2; RRR  Respiratory: CTA B/L; no W/R/R  Gastrointestinal: ileostomy/colostomy bag  Extremities: WWP; no edema, clubbing or cyanosis  Vascular: 2+ radial, DP/PT pulses B/L  Neurological: AAOx2; no focal deficits    MEDICATIONS  (STANDING):  amLODIPine   Tablet 10 milliGRAM(s) Oral daily  atorvastatin 10 milliGRAM(s) Oral at bedtime  dextrose 40% Gel 15 Gram(s) Oral once  dextrose 5%. 1000 milliLiter(s) (100 mL/Hr) IV Continuous <Continuous>  dextrose 5%. 1000 milliLiter(s) (50 mL/Hr) IV Continuous <Continuous>  dextrose 50% Injectable 25 Gram(s) IV Push once  dextrose 50% Injectable 12.5 Gram(s) IV Push once  dextrose 50% Injectable 25 Gram(s) IV Push once  ferrous    sulfate 325 milliGRAM(s) Oral daily  glucagon  Injectable 1 milliGRAM(s) IntraMuscular once  glucagon  Injectable 1 milliGRAM(s) IntraMuscular once  heparin   Injectable 5000 Unit(s) SubCutaneous every 8 hours  hydrALAZINE 75 milliGRAM(s) Oral every 8 hours  insulin glargine Injectable (LANTUS) 8 Unit(s) SubCutaneous at bedtime  insulin lispro (ADMELOG) corrective regimen sliding scale   SubCutaneous Before meals and at bedtime  insulin lispro Injectable (ADMELOG) 2 Unit(s) SubCutaneous three times a day before meals  levothyroxine 50 MICROGram(s) Oral daily  melatonin 5 milliGRAM(s) Oral at bedtime  pantoprazole    Tablet 40 milliGRAM(s) Oral before breakfast  sodium bicarbonate 650 milliGRAM(s) Oral every 8 hours  tamsulosin 0.4 milliGRAM(s) Oral at bedtime    MEDICATIONS  (PRN):      No Known Allergies      LABS                        10.1   6.56  )-----------( 178      ( 08 Feb 2022 06:09 )             30.5     02-08    137  |  99  |  23  ----------------------------<  173<H>  4.5   |  24  |  1.24    Ca    9.6      08 Feb 2022 06:09  Phos  3.0     02-08  Mg     1.8     02-08    TPro  8.1  /  Alb  4.2  /  TBili  0.2  /  DBili  x   /  AST  12  /  ALT  11  /  AlkPhos  81  02-08    PT/INR - ( 08 Feb 2022 06:09 )   PT: 12.2 sec;   INR: 1.02

## 2022-02-08 NOTE — PROGRESS NOTE ADULT - SUBJECTIVE AND OBJECTIVE BOX
CC: Patient is a 62y old  Male who presents with a chief complaint of Altered Mental Status, Hyperkalemia (08 Feb 2022 12:23)    HOSPITAL COURSE:  Mr Wilkerson is a 62M with PMH of HTN, DM c/b neuropathy, hypothyroid, BPH, EtOH liver cirrhosis, toxic megacolon c/b pneumatosis s/p colectomy/ileostomy, with multiple recent admissions (7/27-8/1/2021, 1/6-1/19/2022, and 1/24-1/30/2022) for acute renal failure and hyperkalemia, the last 2 times requiring emergent dialysis, presenting from shelter for altered mental status x1 day. He was found to have hyperkalemia to 8.1 with associated EKG changes (peaked T waves) on admission; also found to be in acute renal failure with lactate of 3.0, hyponatremia and metabolic acidosis; also found to have sepsis 2/2 UTI; admitted to MICU for acute renal failure with hyperkalemia for possible emergent dialysis. CT head negative; CT chest/abd/pelvis with no acute pathology, cholelithiasis, trace pericardial effusion. Patient was given D5+insulin, bicarb, albuterol, calcium gluconate, versed, and started on vanc/zosyn. Patient was started on standing lokelma and bicarb drip. Renal consulted. Vanc/zosyn was d/vitaly and patient was started on ceftriaxone. On 2/6 Na overcorrected too fast to 134, was given 2mcg of DDAVP. Bicarb drip d/vitaly as acidosis resolved and Na at goal. Started on lantus 8 units (in addition to sliding scale) as fingersticks remaining high. 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 Pt stepped down to [facility]. Started lispro 2U TID. Restarted antihypertensive medications. Pt stable, potassium normalized, MEG resolved, saying he wants to go to shelter but feels weaker than baseline. PT consulted. Pt stable for stepdown to [facility]      SUBJECTIVE / INTERVAL HPI: Patient seen and examined at bedside. Pt says he wants to go to shelter. No other compliants    ROS: negative unless otherwise stated above.    VITAL SIGNS:  Vital Signs Last 24 Hrs  T(C): 36.9 (08 Feb 2022 14:00), Max: 36.9 (08 Feb 2022 01:49)  T(F): 98.4 (08 Feb 2022 14:00), Max: 98.5 (08 Feb 2022 01:49)  HR: 78 (08 Feb 2022 11:45) (66 - 88)  BP: 167/78 (08 Feb 2022 11:45) (166/78 - 205/98)  BP(mean): 112 (08 Feb 2022 11:45) (111 - 138)  RR: 18 (08 Feb 2022 11:45) (14 - 28)  SpO2: 98% (08 Feb 2022 11:45) (97% - 100%)      02-07-22 @ 07:01  -  02-08-22 @ 07:00  --------------------------------------------------------  IN: 400 mL / OUT: 2135 mL / NET: -1735 mL        PHYSICAL EXAM:    General: NAD  HEENT: MMM  Neck: supple  Cardiovascular: +S1/S2; RRR  Respiratory: CTA B/L; no W/R/R  Gastrointestinal: ileostomy/colostomy bag  Extremities: WWP; no edema, clubbing or cyanosis  Vascular: 2+ radial, DP/PT pulses B/L  Neurological: AAOx2; no focal deficits    MEDICATIONS:  MEDICATIONS  (STANDING):  amLODIPine   Tablet 10 milliGRAM(s) Oral daily  atorvastatin 10 milliGRAM(s) Oral at bedtime  dextrose 40% Gel 15 Gram(s) Oral once  dextrose 5%. 1000 milliLiter(s) (100 mL/Hr) IV Continuous <Continuous>  dextrose 5%. 1000 milliLiter(s) (50 mL/Hr) IV Continuous <Continuous>  dextrose 50% Injectable 25 Gram(s) IV Push once  dextrose 50% Injectable 12.5 Gram(s) IV Push once  dextrose 50% Injectable 25 Gram(s) IV Push once  ferrous    sulfate 325 milliGRAM(s) Oral daily  glucagon  Injectable 1 milliGRAM(s) IntraMuscular once  glucagon  Injectable 1 milliGRAM(s) IntraMuscular once  heparin   Injectable 5000 Unit(s) SubCutaneous every 8 hours  hydrALAZINE 75 milliGRAM(s) Oral every 8 hours  insulin glargine Injectable (LANTUS) 8 Unit(s) SubCutaneous at bedtime  insulin lispro (ADMELOG) corrective regimen sliding scale   SubCutaneous Before meals and at bedtime  levothyroxine 50 MICROGram(s) Oral daily  melatonin 5 milliGRAM(s) Oral at bedtime  pantoprazole    Tablet 40 milliGRAM(s) Oral before breakfast  sodium bicarbonate 650 milliGRAM(s) Oral every 8 hours  tamsulosin 0.4 milliGRAM(s) Oral at bedtime    MEDICATIONS  (PRN):      ALLERGIES:  Allergies    No Known Allergies    Intolerances        LABS:                        10.1   6.56  )-----------( 178      ( 08 Feb 2022 06:09 )             30.5     02-08    137  |  99  |  23  ----------------------------<  173<H>  4.5   |  24  |  1.24    Ca    9.6      08 Feb 2022 06:09  Phos  3.0     02-08  Mg     1.8     02-08    TPro  8.1  /  Alb  4.2  /  TBili  0.2  /  DBili  x   /  AST  12  /  ALT  11  /  AlkPhos  81  02-08    PT/INR - ( 08 Feb 2022 06:09 )   PT: 12.2 sec;   INR: 1.02              CAPILLARY BLOOD GLUCOSE      POCT Blood Glucose.: 199 mg/dL (08 Feb 2022 11:38)      RADIOLOGY & ADDITIONAL TESTS: Reviewed.

## 2022-02-08 NOTE — PROGRESS NOTE ADULT - ASSESSMENT
Mr Rock is a 62M with HTN, DM c/b neuropathy, hypothyroid, BPH, EtOH liver cirrhosis, toxic megacolon c/b pneumatosis s/p colectomy/ileostomy, with multiple recent admissions (7/27-8/1/2021, 1/6-1/19/2022, and 1/24-1/30/2022) for acute renal failure and hyperkalemia, the last 2 times requiring emergent dialysis, presenting from shelter for altered mental status x1 day. Admitted to MICU for ARF and hyperkalemia w/EKG changes; now resolved and stable for step down to RMF.

## 2022-02-08 NOTE — PROGRESS NOTE ADULT - ASSESSMENT
Assessment/Plan:   62M with HTN, DM c/b neuropathy, hypothyroid, BPH, EtOH liver cirrhosis, toxic megacolon c/b pneumatosis s/p colectomy/ileostomy, with multiple recent admissions (7/27-8/1/2021, 1/6-1/19/2022, and 1/24-1/30/2022) for acute renal failure with hyperkalemia and hyponatremia- requiring emergent dialysis previously. He presents again with AMS and renal failure     MEG - resolved; etiology unclear; possibly 2/2 GI losses from ostomy w/ a perfusional component  hyponatremia- at goal today  uop noted to be 2L/24 hours  c/w ceftriaxone for possibly pyelonephritis/?UTI  BP control  strict i's and o's  daily weights  daily BMP  needs strict f/u upon outpateint to avoid recurrent admissions  avoid nephrotoxic agents    Yumiko Santos D.O  PGY 4 nephrology fellow  [phone]

## 2022-02-08 NOTE — PROGRESS NOTE ADULT - SUBJECTIVE AND OBJECTIVE BOX
Patient is a 62y Male seen and evaluated at bedside. patient is still confused this AM thinks hes in his shelter; MEG and electrolytes derrangements resolved ; uop noted to be 2L/24 hours ; hypertensive this AM      Meds:    amLODIPine   Tablet 10 daily  atorvastatin 10 at bedtime  dextrose 40% Gel 15 once  dextrose 5%. 1000 <Continuous>  dextrose 5%. 1000 <Continuous>  dextrose 50% Injectable 25 once  dextrose 50% Injectable 12.5 once  dextrose 50% Injectable 25 once  ferrous    sulfate 325 daily  glucagon  Injectable 1 once  glucagon  Injectable 1 once  heparin   Injectable 5000 every 8 hours  hydrALAZINE 75 every 8 hours  insulin glargine Injectable (LANTUS) 8 at bedtime  insulin lispro (ADMELOG) corrective regimen sliding scale  Before meals and at bedtime  levothyroxine 50 daily  melatonin 5 at bedtime  pantoprazole    Tablet 40 before breakfast  sodium bicarbonate 650 every 8 hours  tamsulosin 0.4 at bedtime      T(C): , Max: 36.9 (02-08-22 @ 01:49)  T(F): , Max: 98.5 (02-08-22 @ 01:49)  HR: 78 (02-08-22 @ 11:45)  BP: 167/78 (02-08-22 @ 11:45)  BP(mean): 112 (02-08-22 @ 11:45)  RR: 18 (02-08-22 @ 11:45)  SpO2: 98% (02-08-22 @ 11:45)  Wt(kg): --    02-07 @ 07:01  -  02-08 @ 07:00  --------------------------------------------------------  IN: 400 mL / OUT: 2135 mL / NET: -1735 mL          Review of Systems:  all other ROS negative       PHYSICAL EXAM:  GENERAL: AOx1   CHEST/LUNG: decreased breath sounds at the bases  HEART: normal S1S2, RRR  ABDOMEN: Soft, Nontender, +BS,   EXTREMITIES: No clubbing, cyanosis, or edema         LABS:                        10.1   6.56  )-----------( 178      ( 08 Feb 2022 06:09 )             30.5     02-08    137  |  99  |  23  ----------------------------<  173<H>  4.5   |  24  |  1.24    Ca    9.6      08 Feb 2022 06:09  Phos  3.0     02-08  Mg     1.8     02-08    TPro  8.1  /  Alb  4.2  /  TBili  0.2  /  DBili  x   /  AST  12  /  ALT  11  /  AlkPhos  81  02-08      PT/INR - ( 08 Feb 2022 06:09 )   PT: 12.2 sec;   INR: 1.02                    RADIOLOGY & ADDITIONAL STUDIES:

## 2022-02-08 NOTE — PROGRESS NOTE ADULT - PROBLEM SELECTOR PLAN 6
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 Hyperkalemia resolved without dialysis this admission. Acidosis now resolved. CT abd/pelvis without obvious hydronephrosis or stone. Presenting this admission with sepsis 2/2 UTI.  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 Patient with multiple recent admissions for acute renal failure with hyperkalemia, the last 2 times requiring emergent dialysis. Hyperkalemia resolved without dialysis this admission. Acidosis now resolved. CT abd/pelvis without obvious hydronephrosis or stone. Baseline Cr 1-1.2  -Cr slowly downtrending, 1.82 today   -repeat hyperkalemic treatment as needed however K wnl this am (4.4)  -s/p Lokelma 10g q8hr x 24hrs on 2/6   -c/w PO bicarb tabs 650mg q8  -avoid nephrotoxic medications and renally dose medications  -strict I/Os; goal keep net even (can give IV NS if needed)  -renal following, recs appreciated   -f/u AM cortisol to eval for type 4 RTA

## 2022-02-08 NOTE — PHYSICAL THERAPY INITIAL EVALUATION ADULT - PERTINENT HX OF CURRENT PROBLEM, REHAB EVAL
Mr Rock is a 62M with HTN, DM c/b neuropathy, hypothyroid, BPH, EtOH liver cirrhosis, toxic megacolon c/b pneumatosis s/p colectomy/ileostomy, with multiple recent admissions (7/27-8/1/2021, 1/6-1/19/2022, and 1/24-1/30/2022) for acute renal failure and hyperkalemia, the last 2 times requiring emergent dialysis, presenting from shelter for altered mental status x1 day. Admitted to MICU for ARF and hyperkalemia w/EKG changes; now resolved and stable for step down to telemetr

## 2022-02-08 NOTE — PHYSICAL THERAPY INITIAL EVALUATION ADULT - GENERAL OBSERVATIONS, REHAB EVAL
PT IE Completed. Patient received semi supine in bed, +tele, +ostomy, +heplock IV, +(B) SCDs, NAD, willing to work with PT.

## 2022-02-08 NOTE — PROGRESS NOTE ADULT - PROBLEM SELECTOR PLAN 9
Hypothermic w/WBC 22 on admission. Lactate 3 on admission now resolved. CT head, chest and abd/pelvis wnl. UA positive. Purulent urine in [place]. Severe sepsis likely 2/2 UTI. Hx of klebsiella in urine 1/6/22. Normothermic and HDS now.  -BCxs 2/6 NGTD  -c/w ceftriaxone 1g q24 (today 2/8 day 3) x 5-7 days (can d/c on cefpodoxime)  -f/u urine cultures, deescalate w/sensitivities if indicated

## 2022-02-09 ENCOUNTER — TRANSCRIPTION ENCOUNTER (OUTPATIENT)
Age: 62
End: 2022-02-09

## 2022-02-09 LAB
ALBUMIN SERPL ELPH-MCNC: 4.2 G/DL — SIGNIFICANT CHANGE UP (ref 3.3–5)
ALP SERPL-CCNC: 76 U/L — SIGNIFICANT CHANGE UP (ref 40–120)
ALT FLD-CCNC: 9 U/L — LOW (ref 10–45)
ANION GAP SERPL CALC-SCNC: 16 MMOL/L — SIGNIFICANT CHANGE UP (ref 5–17)
AST SERPL-CCNC: 12 U/L — SIGNIFICANT CHANGE UP (ref 10–40)
BASOPHILS # BLD AUTO: 0.01 K/UL — SIGNIFICANT CHANGE UP (ref 0–0.2)
BASOPHILS NFR BLD AUTO: 0.2 % — SIGNIFICANT CHANGE UP (ref 0–2)
BILIRUB SERPL-MCNC: 0.3 MG/DL — SIGNIFICANT CHANGE UP (ref 0.2–1.2)
BUN SERPL-MCNC: 27 MG/DL — HIGH (ref 7–23)
CALCIUM SERPL-MCNC: 10 MG/DL — SIGNIFICANT CHANGE UP (ref 8.4–10.5)
CHLORIDE SERPL-SCNC: 99 MMOL/L — SIGNIFICANT CHANGE UP (ref 96–108)
CO2 SERPL-SCNC: 22 MMOL/L — SIGNIFICANT CHANGE UP (ref 22–31)
CORTIS AM PEAK SERPL-MCNC: 18.71 UG/DL — HIGH (ref 6.02–18.4)
CREAT SERPL-MCNC: 1.45 MG/DL — HIGH (ref 0.5–1.3)
EOSINOPHIL # BLD AUTO: 0.08 K/UL — SIGNIFICANT CHANGE UP (ref 0–0.5)
EOSINOPHIL NFR BLD AUTO: 1.3 % — SIGNIFICANT CHANGE UP (ref 0–6)
GLUCOSE BLDC GLUCOMTR-MCNC: 167 MG/DL — HIGH (ref 70–99)
GLUCOSE BLDC GLUCOMTR-MCNC: 182 MG/DL — HIGH (ref 70–99)
GLUCOSE BLDC GLUCOMTR-MCNC: 265 MG/DL — HIGH (ref 70–99)
GLUCOSE BLDC GLUCOMTR-MCNC: 290 MG/DL — HIGH (ref 70–99)
GLUCOSE SERPL-MCNC: 182 MG/DL — HIGH (ref 70–99)
HCT VFR BLD CALC: 31.9 % — LOW (ref 39–50)
HGB BLD-MCNC: 10.1 G/DL — LOW (ref 13–17)
IMM GRANULOCYTES NFR BLD AUTO: 0.3 % — SIGNIFICANT CHANGE UP (ref 0–1.5)
LYMPHOCYTES # BLD AUTO: 1.23 K/UL — SIGNIFICANT CHANGE UP (ref 1–3.3)
LYMPHOCYTES # BLD AUTO: 20.1 % — SIGNIFICANT CHANGE UP (ref 13–44)
MAGNESIUM SERPL-MCNC: 1.7 MG/DL — SIGNIFICANT CHANGE UP (ref 1.6–2.6)
MCHC RBC-ENTMCNC: 29.9 PG — SIGNIFICANT CHANGE UP (ref 27–34)
MCHC RBC-ENTMCNC: 31.7 GM/DL — LOW (ref 32–36)
MCV RBC AUTO: 94.4 FL — SIGNIFICANT CHANGE UP (ref 80–100)
MONOCYTES # BLD AUTO: 0.55 K/UL — SIGNIFICANT CHANGE UP (ref 0–0.9)
MONOCYTES NFR BLD AUTO: 9 % — SIGNIFICANT CHANGE UP (ref 2–14)
NEUTROPHILS # BLD AUTO: 4.23 K/UL — SIGNIFICANT CHANGE UP (ref 1.8–7.4)
NEUTROPHILS NFR BLD AUTO: 69.1 % — SIGNIFICANT CHANGE UP (ref 43–77)
NRBC # BLD: 0 /100 WBCS — SIGNIFICANT CHANGE UP (ref 0–0)
PHOSPHATE SERPL-MCNC: 3.6 MG/DL — SIGNIFICANT CHANGE UP (ref 2.5–4.5)
PLATELET # BLD AUTO: 175 K/UL — SIGNIFICANT CHANGE UP (ref 150–400)
POTASSIUM SERPL-MCNC: 4.2 MMOL/L — SIGNIFICANT CHANGE UP (ref 3.5–5.3)
POTASSIUM SERPL-SCNC: 4.2 MMOL/L — SIGNIFICANT CHANGE UP (ref 3.5–5.3)
PROT SERPL-MCNC: 8.1 G/DL — SIGNIFICANT CHANGE UP (ref 6–8.3)
RBC # BLD: 3.38 M/UL — LOW (ref 4.2–5.8)
RBC # FLD: 14.5 % — SIGNIFICANT CHANGE UP (ref 10.3–14.5)
SODIUM SERPL-SCNC: 137 MMOL/L — SIGNIFICANT CHANGE UP (ref 135–145)
T4 AB SER-ACNC: 9.14 UG/DL — SIGNIFICANT CHANGE UP (ref 4.5–11.7)
TSH SERPL-MCNC: 2.56 UIU/ML — SIGNIFICANT CHANGE UP (ref 0.27–4.2)
WBC # BLD: 6.12 K/UL — SIGNIFICANT CHANGE UP (ref 3.8–10.5)
WBC # FLD AUTO: 6.12 K/UL — SIGNIFICANT CHANGE UP (ref 3.8–10.5)

## 2022-02-09 PROCEDURE — 99233 SBSQ HOSP IP/OBS HIGH 50: CPT | Mod: GC

## 2022-02-09 RX ORDER — INSULIN GLARGINE 100 [IU]/ML
13 INJECTION, SOLUTION SUBCUTANEOUS AT BEDTIME
Refills: 0 | Status: DISCONTINUED | OUTPATIENT
Start: 2022-02-09 | End: 2022-02-10

## 2022-02-09 RX ORDER — INSULIN LISPRO 100/ML
3 VIAL (ML) SUBCUTANEOUS
Refills: 0 | Status: DISCONTINUED | OUTPATIENT
Start: 2022-02-09 | End: 2022-02-10

## 2022-02-09 RX ORDER — ACETAMINOPHEN 500 MG
650 TABLET ORAL EVERY 6 HOURS
Refills: 0 | Status: DISCONTINUED | OUTPATIENT
Start: 2022-02-09 | End: 2022-02-10

## 2022-02-09 RX ADMIN — Medication 3 UNIT(S): at 17:41

## 2022-02-09 RX ADMIN — PANTOPRAZOLE SODIUM 40 MILLIGRAM(S): 20 TABLET, DELAYED RELEASE ORAL at 06:02

## 2022-02-09 RX ADMIN — Medication 650 MILLIGRAM(S): at 23:00

## 2022-02-09 RX ADMIN — Medication 5 MILLIGRAM(S): at 21:21

## 2022-02-09 RX ADMIN — ATORVASTATIN CALCIUM 10 MILLIGRAM(S): 80 TABLET, FILM COATED ORAL at 21:21

## 2022-02-09 RX ADMIN — Medication 75 MILLIGRAM(S): at 06:02

## 2022-02-09 RX ADMIN — Medication 50 MICROGRAM(S): at 06:02

## 2022-02-09 RX ADMIN — HEPARIN SODIUM 5000 UNIT(S): 5000 INJECTION INTRAVENOUS; SUBCUTANEOUS at 14:00

## 2022-02-09 RX ADMIN — Medication 2: at 17:41

## 2022-02-09 RX ADMIN — Medication 6: at 22:27

## 2022-02-09 RX ADMIN — Medication 75 MILLIGRAM(S): at 21:20

## 2022-02-09 RX ADMIN — Medication 650 MILLIGRAM(S): at 21:20

## 2022-02-09 RX ADMIN — Medication 2 UNIT(S): at 12:40

## 2022-02-09 RX ADMIN — Medication 6: at 12:39

## 2022-02-09 RX ADMIN — Medication 2 UNIT(S): at 08:58

## 2022-02-09 RX ADMIN — Medication 2: at 08:58

## 2022-02-09 RX ADMIN — Medication 325 MILLIGRAM(S): at 11:12

## 2022-02-09 RX ADMIN — CEFTRIAXONE 100 MILLIGRAM(S): 500 INJECTION, POWDER, FOR SOLUTION INTRAMUSCULAR; INTRAVENOUS at 11:12

## 2022-02-09 RX ADMIN — HEPARIN SODIUM 5000 UNIT(S): 5000 INJECTION INTRAVENOUS; SUBCUTANEOUS at 06:02

## 2022-02-09 RX ADMIN — Medication 650 MILLIGRAM(S): at 14:00

## 2022-02-09 RX ADMIN — Medication 75 MILLIGRAM(S): at 14:00

## 2022-02-09 RX ADMIN — Medication 650 MILLIGRAM(S): at 06:01

## 2022-02-09 RX ADMIN — HEPARIN SODIUM 5000 UNIT(S): 5000 INJECTION INTRAVENOUS; SUBCUTANEOUS at 21:19

## 2022-02-09 RX ADMIN — TAMSULOSIN HYDROCHLORIDE 0.4 MILLIGRAM(S): 0.4 CAPSULE ORAL at 21:20

## 2022-02-09 RX ADMIN — Medication 650 MILLIGRAM(S): at 22:15

## 2022-02-09 RX ADMIN — AMLODIPINE BESYLATE 10 MILLIGRAM(S): 2.5 TABLET ORAL at 06:02

## 2022-02-09 RX ADMIN — INSULIN GLARGINE 13 UNIT(S): 100 INJECTION, SOLUTION SUBCUTANEOUS at 22:28

## 2022-02-09 NOTE — OCCUPATIONAL THERAPY INITIAL EVALUATION ADULT - MD ORDER
62M with HTN, DM c/b neuropathy, hypothyroid, BPH, EtOH liver cirrhosis, toxic megacolon c/b pneumatosis s/p colectomy/ileostomy, with multiple recent admissions (7/27-8/1/2021, 1/6-1/19/2022, and 1/24-1/30/2022) for acute renal failure and hyperkalemia, the last 2 times requiring emergent dialysis, presenting from shelter for altered mental status x1 day. Admitted to MICU for ARF and hyperkalemia w/EKG changes; now resolved and stable for step down to [facility].

## 2022-02-09 NOTE — OCCUPATIONAL THERAPY INITIAL EVALUATION ADULT - LIGHT TOUCH SENSATION, LUE, REHAB EVAL
Anesthesia Pre Eval Note    Anesthesia ROS/Med Hx          Pulmonary Review:  Patient does not have a pulmonary history      Neuro/Psych Review:  Patient does not have a neuro/psych history       Cardiovascular Review:  Patient does not have a cardiovascular history       GI/HEPATIC/RENAL Review:  Patient does not have a GI/hepatic/renalhistory       End/Other Review:  Patient does not have an endo/other history        Relevant Problems   No relevant active problems       Physical Exam     Airway   Mallampati: I    Cardiovascular  Cardiovascular exam normal    Pulmonary Exam  Pulmonary exam normal    Abdominal Exam  Abdominal exam normal    Other Findings  Denies loose teeth      Anesthesia Plan    ASA Status: 1    Anesthesia Type: General    Induction: Intravenous  Preferred Airway Type: ETT      Risks Discussed: Nausea, Vomiting, Sore Throat and Dental Injury  Checklist  Reviewed: Lab Results, Consultations, Past Med History, NPO Status, Patient Summary, Allergies, Beta Blocker Status, Medications, Problem list, Nursing Notes and Pregnancy Test Results    Informed Consent  The proposed anesthetic plan, including its risks and benefits, have been discussed with the Patient  - along with the risks and benefits of alternatives.  Questions were encouraged and answered and the patient and/or representative understands and agrees to proceed.     Blood Products: Not Anticipated    
within normal limits

## 2022-02-09 NOTE — OCCUPATIONAL THERAPY INITIAL EVALUATION ADULT - LIVES WITH, PROFILE
Pt admitted from shelter and reports at baseline he is ind for ADLs and uses RW /rollator for community mobility.

## 2022-02-09 NOTE — PROGRESS NOTE ADULT - PROBLEM SELECTOR PLAN 6
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 Patient with multiple recent admissions for acute renal failure with hyperkalemia, the last 2 times requiring emergent dialysis. Hyperkalemia resolved without dialysis this admission. Acidosis now resolved. CT abd/pelvis without obvious hydronephrosis or stone. 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 Patient with multiple recent admissions for acute renal failure with hyperkalemia, the last 2 times requiring emergent dialysis. Hyperkalemia resolved without dialysis this admission. Acidosis now resolved. CT abd/pelvis without obvious hydronephrosis or stone. Baseline Cr 1-1.2.    - c/w PO bicarb tabs 650mg q8  - avoid nephrotoxic medications and renally dose medications  - strict I/Os; goal keep net even (can give IV NS if needed)  - renal following, recs appreciated

## 2022-02-09 NOTE — PROGRESS NOTE ADULT - PROBLEM SELECTOR PLAN 1
A&O x2-3  Presented with AMS x 1 day. CT head wnl. Utox negative. Alcohol and ammonia levels wnl. Likely 2/2 sepsis from UTI.  -c/w sepsis management as below  -c/w renal failure tx as below  -hold home Trazodone 100mg daily iso AMS A&O x 3. RESOLVED.     Presented with AMS x 1 day. CT head wnl. Utox negative. Alcohol and ammonia levels wnl. Likely 2/2 sepsis from UTI.    - c/w sepsis management as below  - c/w renal failure tx as below  - hold home Trazodone 100mg daily - can restart if needed

## 2022-02-09 NOTE — DISCHARGE NOTE PROVIDER - CARE PROVIDERS DIRECT ADDRESSES
,DirectAddress_Unknown ,DirectAddress_Unknown,[email or web address] ,[email or web address],DirectAddress_Unknown,[email or web address]

## 2022-02-09 NOTE — DISCHARGE NOTE PROVIDER - NSDCMRMEDTOKEN_GEN_ALL_CORE_FT
atorvastatin 10 mg oral tablet: 1 tab(s) orally once a day   DULoxetine 30 mg oral delayed release capsule: 1  orally once a day (at bedtime)  ferrous sulfate 325 mg (65 mg elemental iron) oral tablet: 1  orally once a day  hydrALAZINE 50 mg oral tablet: 1 tab(s) orally every 8 hours  metFORMIN 500 mg oral tablet: 1 tab(s) orally every 12 hours   Neurontin 300 mg oral capsule: 1 cap(s) orally 3 times a day  Norvasc 10 mg oral tablet: 1 tab(s) orally every 24 hours  pantoprazole 40 mg oral delayed release tablet: 1 tab(s) orally every 12 hours  SITagliptin 50 mg oral tablet: 1 tab(s) orally every 12 hours   Synthroid 50 mcg (0.05 mg) oral tablet: 1 tab(s) orally once a day  tamsulosin 0.4 mg oral capsule: 1 cap(s) orally once a day (at bedtime)  traZODone 100 mg oral tablet: 1 tab(s) orally once a day (at bedtime)    acetaminophen 325 mg oral tablet: 2 tab(s) orally every 6 hours, As needed, Temp greater or equal to 38C (100.4F), Mild Pain (1 - 3)  atorvastatin 10 mg oral tablet: 1 tab(s) orally once a day   DULoxetine 30 mg oral delayed release capsule: 1  orally once a day (at bedtime)  ferrous sulfate 325 mg (65 mg elemental iron) oral tablet: 1  orally once a day  hydrALAZINE 25 mg oral tablet: 3 tab(s) orally 3 times a day   hydrALAZINE 50 mg oral tablet: 1 tab(s) orally every 8 hours  metFORMIN 500 mg oral tablet: 1 tab(s) orally every 12 hours   Norvasc 10 mg oral tablet: 1 tab(s) orally every 24 hours  pantoprazole 40 mg oral delayed release tablet: 1 tab(s) orally every 12 hours  SITagliptin 50 mg oral tablet: 1 tab(s) orally every 12 hours   sodium bicarbonate 650 mg oral tablet: 1 tab(s) orally every 8 hours  Synthroid 50 mcg (0.05 mg) oral tablet: 1 tab(s) orally once a day  tamsulosin 0.4 mg oral capsule: 1 cap(s) orally once a day (at bedtime)  traZODone 100 mg oral tablet: 1 tab(s) orally once a day (at bedtime)    acetaminophen 325 mg oral tablet: 2 tab(s) orally every 6 hours, As needed, Temp greater or equal to 38C (100.4F), Mild Pain (1 - 3)  atorvastatin 10 mg oral tablet: 1 tab(s) orally once a day   DULoxetine 30 mg oral delayed release capsule: 1  orally once a day (at bedtime)  ferrous sulfate 325 mg (65 mg elemental iron) oral tablet: 1  orally once a day  hydrALAZINE 25 mg oral tablet: 3 tab(s) orally 3 times a day   insulin glargine: 13 unit(s) subcutaneous once a day (at bedtime)  insulin lispro 100 units/mL injectable solution: 5 unit(s) subcutaneous 3 times a day (before meals)  Norvasc 10 mg oral tablet: 1 tab(s) orally every 24 hours  pantoprazole 40 mg oral delayed release tablet: 1 tab(s) orally every 12 hours  sodium bicarbonate 650 mg oral tablet: 1 tab(s) orally every 8 hours  Synthroid 50 mcg (0.05 mg) oral tablet: 1 tab(s) orally once a day  tamsulosin 0.4 mg oral capsule: 1 cap(s) orally once a day (at bedtime)  traZODone 100 mg oral tablet: 1 tab(s) orally once a day (at bedtime)    atorvastatin 10 mg oral tablet: 1 tab(s) orally once a day   DULoxetine 30 mg oral delayed release capsule: 1  orally once a day (at bedtime)  ferrous sulfate 325 mg (65 mg elemental iron) oral tablet: 1  orally once a day  hydrALAZINE 25 mg oral tablet: 3 tab(s) orally 3 times a day   Januvia 50 mg oral tablet: 1 tab(s) orally once a day   Norvasc 10 mg oral tablet: 1 tab(s) orally every 24 hours  pantoprazole 40 mg oral delayed release tablet: 1 tab(s) orally once a day   sodium bicarbonate 650 mg oral tablet: 1 tab(s) orally every 8 hours  Synthroid 50 mcg (0.05 mg) oral tablet: 1 tab(s) orally once a day  tamsulosin 0.4 mg oral capsule: 1 cap(s) orally once a day (at bedtime)  traZODone 100 mg oral tablet: 1 tab(s) orally once a day (at bedtime)

## 2022-02-09 NOTE — PROGRESS NOTE ADULT - PROBLEM SELECTOR PLAN 10
home meds: Janumet 50 mg-500 mg BID. Gabapentin for peripheral neuropathy discontinued last admission.  A1C 6.7  -hold oral diabetes medications  -c/w mISS  -c/w lispro 2, lantus 8     #Hypothyroidism  -c/w home synthroid 50mcg qd home meds: Janumet 50 mg-500 mg BID. Gabapentin for peripheral neuropathy discontinued last admission.  A1C 6.7    -hold oral diabetes medications  -c/w mISS  -c/w lispro 3, lantus 13 - increased based on sliding scale requirements      #Hypothyroidism  -c/w home synthroid 50mcg qd

## 2022-02-09 NOTE — PROGRESS NOTE ADULT - PROBLEM SELECTOR PLAN 2
home meds: hydrALAZINE 50mg q8hrs, norvasc 10mg qd  - c/w norvasc 10mg  - c/w hydralazine 75mg q8hr home meds: hydralazine 50mg q8hrs, norvasc 10mg qd    - c/w norvasc 10mg  - c/w hydralazine 75mg q8hr

## 2022-02-09 NOTE — PROGRESS NOTE ADULT - SUBJECTIVE AND OBJECTIVE BOX
**incomplete**  SUBJECTIVE/OVERNIGHT EVENTS: No acute overnight events. Pt seen in AM at bedside, resting comfortably in bed, and does not appear to be in any acute distress. Patient has little health understanding. States that he did dialysis at shelter unknown schedule. Is non-compliant with medication at home. Denies any fever, headache, chills, swelling.      VITAL SIGNS:  Vital Signs Last 24 Hrs  T(C): 36.7 (09 Feb 2022 05:12), Max: 37.1 (08 Feb 2022 18:20)  T(F): 98 (09 Feb 2022 05:12), Max: 98.7 (08 Feb 2022 18:20)  HR: 85 (09 Feb 2022 05:12) (78 - 96)  BP: 135/68 (09 Feb 2022 07:00) (135/68 - 190/86)  BP(mean): 104 (08 Feb 2022 20:00) (104 - 124)  RR: 16 (09 Feb 2022 05:12) (16 - 18)  SpO2: 98% (09 Feb 2022 05:12) (98% - 100%)    PHYSICAL EXAM:  General: NAD, sitting up in bed, conversational, sad affect  HEENT: MMM  Cardiovascular: +S1/S2; RRR  Respiratory: CTA B/L; no W/R/R  Gastrointestinal: ileostomy/colostomy bag, no TTP   Extremities: WWP; no edema, clubbing or cyanosis  Vascular: 2+ radial, DP/PT pulses B/L  Neurological: AAOx2; no focal deficits      MEDICATIONS:  MEDICATIONS  (STANDING):  amLODIPine   Tablet 10 milliGRAM(s) Oral daily  atorvastatin 10 milliGRAM(s) Oral at bedtime  cefTRIAXone   IVPB 1000 milliGRAM(s) IV Intermittent every 24 hours  dextrose 40% Gel 15 Gram(s) Oral once  dextrose 5%. 1000 milliLiter(s) (100 mL/Hr) IV Continuous <Continuous>  dextrose 5%. 1000 milliLiter(s) (50 mL/Hr) IV Continuous <Continuous>  dextrose 50% Injectable 25 Gram(s) IV Push once  dextrose 50% Injectable 12.5 Gram(s) IV Push once  dextrose 50% Injectable 25 Gram(s) IV Push once  ferrous    sulfate 325 milliGRAM(s) Oral daily  glucagon  Injectable 1 milliGRAM(s) IntraMuscular once  glucagon  Injectable 1 milliGRAM(s) IntraMuscular once  heparin   Injectable 5000 Unit(s) SubCutaneous every 8 hours  hydrALAZINE 75 milliGRAM(s) Oral every 8 hours  insulin glargine Injectable (LANTUS) 8 Unit(s) SubCutaneous at bedtime  insulin lispro (ADMELOG) corrective regimen sliding scale   SubCutaneous Before meals and at bedtime  insulin lispro Injectable (ADMELOG) 2 Unit(s) SubCutaneous three times a day before meals  levothyroxine 50 MICROGram(s) Oral daily  melatonin 5 milliGRAM(s) Oral at bedtime  pantoprazole    Tablet 40 milliGRAM(s) Oral before breakfast  sodium bicarbonate 650 milliGRAM(s) Oral every 8 hours  tamsulosin 0.4 milliGRAM(s) Oral at bedtime    MEDICATIONS  (PRN):      ALLERGIES:  Allergies    No Known Allergies    Intolerances        LABS:                        10.1   6.56  )-----------( 178      ( 08 Feb 2022 06:09 )             30.5     02-08    137  |  99  |  23  ----------------------------<  173<H>  4.5   |  24  |  1.24    Ca    9.6      08 Feb 2022 06:09  Phos  3.0     02-08  Mg     1.8     02-08    TPro  8.1  /  Alb  4.2  /  TBili  0.2  /  DBili  x   /  AST  12  /  ALT  11  /  AlkPhos  81  02-08    PT/INR - ( 08 Feb 2022 06:09 )   PT: 12.2 sec;   INR: 1.02              RADIOLOGY & ADDITIONAL TESTS: Reviewed. SUBJECTIVE/OVERNIGHT EVENTS: No acute overnight events. Pt seen in AM at bedside, resting comfortably in bed, and does not appear to be in any acute distress. Patient has little health understanding. States that he did dialysis at shelter unknown schedule. Is non-compliant with medication at home. Denies any fever, headache, chills, swelling.      VITAL SIGNS:  Vital Signs Last 24 Hrs  T(C): 36.7 (09 Feb 2022 05:12), Max: 37.1 (08 Feb 2022 18:20)  T(F): 98 (09 Feb 2022 05:12), Max: 98.7 (08 Feb 2022 18:20)  HR: 85 (09 Feb 2022 05:12) (78 - 96)  BP: 135/68 (09 Feb 2022 07:00) (135/68 - 190/86)  BP(mean): 104 (08 Feb 2022 20:00) (104 - 124)  RR: 16 (09 Feb 2022 05:12) (16 - 18)  SpO2: 98% (09 Feb 2022 05:12) (98% - 100%)    PHYSICAL EXAM:  General: NAD, sitting up in bed, conversational, sad affect  HEENT: MMM  Cardiovascular: +S1/S2; RRR  Respiratory: CTA B/L; no W/R/R  Gastrointestinal: ileostomy/colostomy bag, no TTP   Extremities: WWP; no edema, clubbing or cyanosis  Vascular: 2+ radial, DP/PT pulses B/L  Neurological: AAOx2; no focal deficits      MEDICATIONS:  MEDICATIONS  (STANDING):  amLODIPine   Tablet 10 milliGRAM(s) Oral daily  atorvastatin 10 milliGRAM(s) Oral at bedtime  cefTRIAXone   IVPB 1000 milliGRAM(s) IV Intermittent every 24 hours  dextrose 40% Gel 15 Gram(s) Oral once  dextrose 5%. 1000 milliLiter(s) (100 mL/Hr) IV Continuous <Continuous>  dextrose 5%. 1000 milliLiter(s) (50 mL/Hr) IV Continuous <Continuous>  dextrose 50% Injectable 25 Gram(s) IV Push once  dextrose 50% Injectable 12.5 Gram(s) IV Push once  dextrose 50% Injectable 25 Gram(s) IV Push once  ferrous    sulfate 325 milliGRAM(s) Oral daily  glucagon  Injectable 1 milliGRAM(s) IntraMuscular once  glucagon  Injectable 1 milliGRAM(s) IntraMuscular once  heparin   Injectable 5000 Unit(s) SubCutaneous every 8 hours  hydrALAZINE 75 milliGRAM(s) Oral every 8 hours  insulin glargine Injectable (LANTUS) 8 Unit(s) SubCutaneous at bedtime  insulin lispro (ADMELOG) corrective regimen sliding scale   SubCutaneous Before meals and at bedtime  insulin lispro Injectable (ADMELOG) 2 Unit(s) SubCutaneous three times a day before meals  levothyroxine 50 MICROGram(s) Oral daily  melatonin 5 milliGRAM(s) Oral at bedtime  pantoprazole    Tablet 40 milliGRAM(s) Oral before breakfast  sodium bicarbonate 650 milliGRAM(s) Oral every 8 hours  tamsulosin 0.4 milliGRAM(s) Oral at bedtime    MEDICATIONS  (PRN):      ALLERGIES:  Allergies    No Known Allergies    Intolerances        LABS:                        10.1   6.56  )-----------( 178      ( 08 Feb 2022 06:09 )             30.5     02-08    137  |  99  |  23  ----------------------------<  173<H>  4.5   |  24  |  1.24    Ca    9.6      08 Feb 2022 06:09  Phos  3.0     02-08  Mg     1.8     02-08    TPro  8.1  /  Alb  4.2  /  TBili  0.2  /  DBili  x   /  AST  12  /  ALT  11  /  AlkPhos  81  02-08    PT/INR - ( 08 Feb 2022 06:09 )   PT: 12.2 sec;   INR: 1.02              RADIOLOGY & ADDITIONAL TESTS: Reviewed.

## 2022-02-09 NOTE — PROGRESS NOTE ADULT - PROBLEM SELECTOR PLAN 5
hx of toxic megacolon, output draining brown stool  -continue to monitor output hx of toxic megacolon, output draining brown stool    -continue to monitor output

## 2022-02-09 NOTE — DISCHARGE NOTE PROVIDER - CARE PROVIDER_API CALL
Ovidio Pham  [facility] [address] [place] [address]  Phone: [phone]  Fax: (   )    -  Follow Up Time: 2 weeks   Ovidio Pham  [address] [place] [address]  Phone: [phone]  Fax: (   )    -  Follow Up Time: 2 weeks    Vicki Obrien)  Nephrology  [address] [place] [address], [address] [place] [address]  Phone: [phone]  Fax: [phone]  Follow Up Time: 2 weeks   Vicki Obrien)  Nephrology  [address], [address] [place] [address]  Phone: [phone]  Fax: [phone]  Follow Up Time: 2 weeks    Ovidio Pham  [address] [place] [address]  Phone: [phone]  Fax: (   )    -  Follow Up Time: 2 weeks    [facility],   Please make sure to follow with [facility] to fill all your prescriptions within 1 week.   [address] [place] [address]  call: [phone]  Phone: (   )    -  Fax: (   )    -  Follow Up Time:

## 2022-02-09 NOTE — OCCUPATIONAL THERAPY INITIAL EVALUATION ADULT - DIAGNOSIS, OT EVAL
Pt p/w B LE pain  while hallucinating (stating there are people in his room) demonstrating decrease balance, strength, safety awareness affecting ADLs and functional mobility

## 2022-02-09 NOTE — PROGRESS NOTE ADULT - ATTENDING COMMENTS
continues to improve, denies toxic ingestion
Acute renal failure with hyperkalemia with metabolic acidosis. 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 Clinical improvement in renal failure and acidosis.  Hyponatremia resolved. UTI secondary to chronic soliz. On ceftriaxone. Rest as above. May transfer to [facility]. Rest as above
Acute renal failure with hyperkalemia with metabolic acidosis. 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 Plan to add one dose of desmopressin for excessive increase in sodium. Nephrology consult. Rest as above.
Patient discussed with resident team and plan of care reviewed. I have personally reviewed all pertinent labs and imaging and performed an independent history and physical. Resident note personally reviewed, and I agree with above resident note with the following additions:    62YOM with history of homelessness, essential HTN, non-insulin dependent DM2 (a1c 6.7% Jan 2022), hypothyroidism, BPH, alcoholic cirrhosis, toxic megacolon s/p colectomy/ileostomy, recurrent admissions for acute renal failure requiring HD), admitted initially to MICU for acute metabolic encephalopathy 2/2 acute renal failure with hyperkalemia (not needing HD this admission, likely ATN 2/2 sepsis or prerenal), hyponatremia (resolved) lactic acidosis, and sepsis POA 2/2 urinary tract infection.     Overall is clinically improving - creatinine bumped a little bit today though. Otherwise metabolic acidosis, potassium, sepsis improved. PT rec NANCY - will discuss with patient - if declines then plan will be dispo to shelter. Anticipate may be medically ready 24-48 hours pending creatinine.
Antihypertensives restarted. Pt needs PT evaluation prior to discharge.
This patient was evaluated in the MICU today and transferred to the [facility] for continuity of care.
returns to our service after recent DC - similar issue with ARF and electrolyte abnormalities that have since resolved. Dispo to [facility] vs back to shelter. Nephro work up for recurrent ARF and hyperkalemia

## 2022-02-09 NOTE — DISCHARGE NOTE PROVIDER - NSDCFUSCHEDAPPT_GEN_ALL_CORE_FT
STUART DE LOS SANTOS ; 02/14/2022 ; [facility]PP Nephro [address]  STUART DE LOS SANTOS ; 02/18/2022 ; NPP PulmMed [address] STUART DE LOS SANTOS ; 02/18/2022 ; NPP Pul[address]

## 2022-02-09 NOTE — DISCHARGE NOTE PROVIDER - PROVIDER TOKENS
FREE:[LAST:[Vero],FIRST:[Ovidio],PHONE:[phone]],FAX:[(   )    -],ADDRESS:[[address] [place] [address]],FOLLOWUP:[2 weeks]] FREE:[LAST:[Vero],FIRST:[Ovidio],PHONE:[phone]],FAX:[(   )    -],ADDRESS:[[address] [place] [address]],FOLLOWUP:[2 weeks]],PROVIDER:[TOKEN:[[id number]],FOLLOWUP:[2 weeks]] PROVIDER:[TOKEN:[[id number]],FOLLOWUP:[2 weeks]],FREE:[LAST:[Vero],FIRST:[Ovidio],PHONE:[phone]:[(   )    -],ADDRESS:[[address] [place] [address]],FOLLOWUP:[2 weeks]],FREE:[LAST:[[facility]],PHONE:[(   )    -],FAX:[(   )    -],ADDRESS:[Please make sure to follow with [facility] to fill all your prescriptions within 1 week.   [address] [place] [address]  call: [phone]]]

## 2022-02-09 NOTE — PROGRESS NOTE ADULT - PROBLEM SELECTOR PLAN 7
Na 121 on admission, increased to 127 after 2.5L NS bolus in the ED. Na improved to 134 this AM. S/p D5 and bicarb drips. S/p 1 dose DDAVP yesterday for overcorrection   -Na 137 today, correcting well, holding further fluids for now as at goal  -hold home Duloxetine  -renal following, appreciate recs Na 121 on admission, increased to 127 after 2.5L NS bolus in the ED. Na improved to 134 this AM. S/p D5 and bicarb drips. S/p 1 dose DDAVP 2/7 for overcorrection.    - RESOLVED  -renal following, appreciate recs

## 2022-02-09 NOTE — DISCHARGE NOTE PROVIDER - NSDCCPCAREPLAN_GEN_ALL_CORE_FT
PRINCIPAL DISCHARGE DIAGNOSIS  Diagnosis: ARF (acute renal failure)  Assessment and Plan of Treatment: You have history of recurrent acute kidney injuries of unknown ethiology for which we consulted Nephrology. During these episodes your levels of potassium are found very elevated and fortunatly this episode you did not requiere emergent dialysis. You will continue with sodium bicarbonate tablets 650 mg 3 times a day. Please discontinue your home gabapentin as it may be a contributing factor in your frequent acute kidney injuries.      SECONDARY DISCHARGE DIAGNOSES  Diagnosis: Sepsis secondary to UTI  Assessment and Plan of Treatment: You came in altered, hypothermic and elevated white blood cells suggestive of an infection that we found in your urine. We treated empirically with Ceftriaxone which you completed 7 days.    Diagnosis: Metabolic encephalopathy  Assessment and Plan of Treatment: You presented with altered mental status most likely as a consecuence of your urinary tract infection. Imaging of the head was unremarkable.    Diagnosis: HTN (hypertension)  Assessment and Plan of Treatment: You were persistently having elevated blood pressure recordings for which we increased your home hydralazine to 75 mg 3 times a day and you will continue your norvasc 10 mg daily.    Diagnosis: Anemia  Assessment and Plan of Treatment: You have low number of red blood cells in your body for which you will continue to take iron supplementations daily to increase the production of your cells. If you develop constipation consider supplementations with senna and miralax as needed.     PRINCIPAL DISCHARGE DIAGNOSIS  Diagnosis: ARF (acute renal failure)  Assessment and Plan of Treatment: You have history of recurrent acute kidney injuries of unknown ethiology for which we consulted Nephrology. During these episodes your levels of potassium are found very elevated and fortunatly this episode you did not requiere emergent dialysis. You will continue with sodium bicarbonate tablets 650 mg 3 times a day. Please discontinue your home gabapentin as it may be a contributing factor in your frequent acute kidney injuries.  - Please make sure to follow with [facility] to fill all your prescriptions.      SECONDARY DISCHARGE DIAGNOSES  Diagnosis: Sepsis secondary to UTI  Assessment and Plan of Treatment: You came in altered, hypothermic and elevated white blood cells suggestive of an infection that we found in your urine. We treated empirically with Ceftriaxone which you completed 7 days.    Diagnosis: Metabolic encephalopathy  Assessment and Plan of Treatment: You presented with altered mental status most likely as a consecuence of your urinary tract infection. Imaging of the head was unremarkable.    Diagnosis: HTN (hypertension)  Assessment and Plan of Treatment: You were persistently having elevated blood pressure recordings for which we increased your home hydralazine to 75 mg 3 times a day and you will continue your norvasc 10 mg daily.  - Please make sure to follow with [facility] to fill all your prescriptions.    Diagnosis: Anemia  Assessment and Plan of Treatment: You have low number of red blood cells in your body for which you will continue to take iron supplementations daily to increase the production of your cells. If you develop constipation consider supplementations with senna and miralax as needed.  - Please make sure to follow with [facility] to fill all your prescriptions.    Diagnosis: DM2 (diabetes mellitus, type 2)  Assessment and Plan of Treatment: You have history of type 2 diabetes and will be discharge on januvia 50 mg daily.  - Please make sure to follow with [facility] to fill all your prescriptions.     PRINCIPAL DISCHARGE DIAGNOSIS  Diagnosis: ARF (acute renal failure)  Assessment and Plan of Treatment: You have history of recurrent acute kidney injuries of unknown ethiology for which we consulted Nephrology. During these episodes your levels of potassium are found very elevated and fortunately this episode you did not requiere emergent dialysis. You will continue with sodium bicarbonate tablets 650 mg 3 times a day. Please discontinue your home gabapentin and metformin as it may be a contributing factor in your frequent acute kidney injuries. Please continue to eat and drink balanced meals.   - Please make sure to follow with [facility] as they will be able to monitor you and help manage your medications. You should go to the clinic provided within 1 week of discharge. Please follow up with them for additional medication prescriptions.      SECONDARY DISCHARGE DIAGNOSES  Diagnosis: HTN (hypertension)  Assessment and Plan of Treatment: You were persistently having elevated blood pressure recordings for which we increased your home hydralazine to 75 mg 3 times a day and you will continue your norvasc 10 mg daily.  - Please make sure to follow with [facility] to fill all your prescriptions.    Diagnosis: DM2 (diabetes mellitus, type 2)  Assessment and Plan of Treatment: Stop taking metformin. You have history of type 2 diabetes and will be discharge on ONLY januvia 50 mg daily.  - Please make sure to follow with [facility] to fill all your prescriptions.    Diagnosis: Anemia  Assessment and Plan of Treatment: You have low number of red blood cells in your body for which you will continue to take iron supplementations daily to increase the production of your cells. If you develop constipation consider supplementations with senna and miralax as needed.  - Please make sure to follow with [facility] to fill all your prescriptions.

## 2022-02-09 NOTE — PROGRESS NOTE ADULT - PROBLEM SELECTOR PLAN 4
Patient with history of alcohol overuse and cirrhosis.  Currently not decompensated. Reports that last drink was 10 years ago. Ammonia level on admission wnl.  -continue to monitor Patient with history of alcohol overuse and cirrhosis. Currently not decompensated. Reports that last drink was 10 years ago. Ammonia level on admission wnl.    -continue to monitor

## 2022-02-09 NOTE — DISCHARGE NOTE PROVIDER - NSDCFUADDAPPT_GEN_ALL_CORE_FT
Please make sure to follow up with your Primary Care Physician Dr Ovidio Pham as soon as possible.     Please make sure to call and schedule an initial appointment with the Nephrologist Dr Vicki Obrien as soon as possible.  Please make sure to follow up with your Primary Care Physician Dr Ovidio Pham as soon as possible.     Please make sure to call and schedule an initial appointment with the Nephrologist Dr Vicki Obrien as soon as possible.     Please make sure to follow with [facility] to fill all your prescriptions.   [address] [place] [address]  call: [phone]

## 2022-02-09 NOTE — PROGRESS NOTE ADULT - PROBLEM SELECTOR PLAN 9
Hypothermic w/WBC 22 on admission. Lactate 3 on admission now resolved. CT head, chest and abd/pelvis wnl. UA positive. Purulent urine in [place]. Severe sepsis likely 2/2 UTI. Hx of klebsiella in urine 1/6/22. Normothermic and HDS now.  -BCxs 2/6 NGTD  -c/w ceftriaxone 1g q24 (today 2/8 day 3) x 5-7 days (can d/c on cefpodoxime)  -f/u urine cultures, deescalate w/sensitivities if indicated Hypothermic w/WBC 22 on admission. Lactate 3 on admission now resolved. CT head, chest and abd/pelvis wnl. UA positive. Purulent urine in [place]. Severe sepsis likely 2/2 UTI. Hx of klebsiella in urine 1/6/22. Normothermic and HDS now.    - BCxs 2/6 NGTD  - c/w ceftriaxone 1g q24 (today 2/9 day 4) x 7 day (can d/c on cefpodoxime)  - urine cultures resulted

## 2022-02-10 LAB
ANION GAP SERPL CALC-SCNC: 11 MMOL/L — SIGNIFICANT CHANGE UP (ref 5–17)
BUN SERPL-MCNC: 25 MG/DL — HIGH (ref 7–23)
CALCIUM SERPL-MCNC: 9.9 MG/DL — SIGNIFICANT CHANGE UP (ref 8.4–10.5)
CHLORIDE SERPL-SCNC: 101 MMOL/L — SIGNIFICANT CHANGE UP (ref 96–108)
CO2 SERPL-SCNC: 25 MMOL/L — SIGNIFICANT CHANGE UP (ref 22–31)
CREAT SERPL-MCNC: 1.51 MG/DL — HIGH (ref 0.5–1.3)
GLUCOSE BLDC GLUCOMTR-MCNC: 114 MG/DL — HIGH (ref 70–99)
GLUCOSE BLDC GLUCOMTR-MCNC: 152 MG/DL — HIGH (ref 70–99)
GLUCOSE BLDC GLUCOMTR-MCNC: 212 MG/DL — HIGH (ref 70–99)
GLUCOSE BLDC GLUCOMTR-MCNC: 267 MG/DL — HIGH (ref 70–99)
GLUCOSE SERPL-MCNC: 104 MG/DL — HIGH (ref 70–99)
HCT VFR BLD CALC: 29.5 % — LOW (ref 39–50)
HGB BLD-MCNC: 9.4 G/DL — LOW (ref 13–17)
MAGNESIUM SERPL-MCNC: 1.8 MG/DL — SIGNIFICANT CHANGE UP (ref 1.6–2.6)
MCHC RBC-ENTMCNC: 30.1 PG — SIGNIFICANT CHANGE UP (ref 27–34)
MCHC RBC-ENTMCNC: 31.9 GM/DL — LOW (ref 32–36)
MCV RBC AUTO: 94.6 FL — SIGNIFICANT CHANGE UP (ref 80–100)
NRBC # BLD: 0 /100 WBCS — SIGNIFICANT CHANGE UP (ref 0–0)
OSMOLALITY UR: 199 MOSM/KG — LOW (ref 300–900)
PHOSPHATE SERPL-MCNC: 3.5 MG/DL — SIGNIFICANT CHANGE UP (ref 2.5–4.5)
PLATELET # BLD AUTO: 166 K/UL — SIGNIFICANT CHANGE UP (ref 150–400)
POTASSIUM SERPL-MCNC: 4.3 MMOL/L — SIGNIFICANT CHANGE UP (ref 3.5–5.3)
POTASSIUM SERPL-SCNC: 4.3 MMOL/L — SIGNIFICANT CHANGE UP (ref 3.5–5.3)
RBC # BLD: 3.12 M/UL — LOW (ref 4.2–5.8)
RBC # FLD: 14.2 % — SIGNIFICANT CHANGE UP (ref 10.3–14.5)
SODIUM SERPL-SCNC: 137 MMOL/L — SIGNIFICANT CHANGE UP (ref 135–145)
WBC # BLD: 5.1 K/UL — SIGNIFICANT CHANGE UP (ref 3.8–10.5)
WBC # FLD AUTO: 5.1 K/UL — SIGNIFICANT CHANGE UP (ref 3.8–10.5)

## 2022-02-10 PROCEDURE — 99232 SBSQ HOSP IP/OBS MODERATE 35: CPT

## 2022-02-10 RX ORDER — FLUTICASONE PROPIONATE 50 MCG
1 SPRAY, SUSPENSION NASAL
Refills: 0 | Status: DISCONTINUED | OUTPATIENT
Start: 2022-02-10 | End: 2022-02-16

## 2022-02-10 RX ORDER — TRAZODONE HCL 50 MG
50 TABLET ORAL ONCE
Refills: 0 | Status: COMPLETED | OUTPATIENT
Start: 2022-02-10 | End: 2022-02-10

## 2022-02-10 RX ORDER — INSULIN LISPRO 100/ML
5 VIAL (ML) SUBCUTANEOUS
Refills: 0 | Status: DISCONTINUED | OUTPATIENT
Start: 2022-02-10 | End: 2022-02-16

## 2022-02-10 RX ORDER — TRAMADOL HYDROCHLORIDE 50 MG/1
25 TABLET ORAL ONCE
Refills: 0 | Status: DISCONTINUED | OUTPATIENT
Start: 2022-02-10 | End: 2022-02-10

## 2022-02-10 RX ORDER — INSULIN LISPRO 100/ML
VIAL (ML) SUBCUTANEOUS
Refills: 0 | Status: DISCONTINUED | OUTPATIENT
Start: 2022-02-10 | End: 2022-02-16

## 2022-02-10 RX ORDER — GABAPENTIN 400 MG/1
100 CAPSULE ORAL THREE TIMES A DAY
Refills: 0 | Status: DISCONTINUED | OUTPATIENT
Start: 2022-02-10 | End: 2022-02-10

## 2022-02-10 RX ORDER — INSULIN GLARGINE 100 [IU]/ML
13 INJECTION, SOLUTION SUBCUTANEOUS AT BEDTIME
Refills: 0 | Status: DISCONTINUED | OUTPATIENT
Start: 2022-02-10 | End: 2022-02-16

## 2022-02-10 RX ORDER — MAGNESIUM SULFATE 500 MG/ML
1 VIAL (ML) INJECTION ONCE
Refills: 0 | Status: COMPLETED | OUTPATIENT
Start: 2022-02-10 | End: 2022-02-10

## 2022-02-10 RX ORDER — DULOXETINE HYDROCHLORIDE 30 MG/1
30 CAPSULE, DELAYED RELEASE ORAL AT BEDTIME
Refills: 0 | Status: DISCONTINUED | OUTPATIENT
Start: 2022-02-10 | End: 2022-02-16

## 2022-02-10 RX ORDER — ACETAMINOPHEN 500 MG
650 TABLET ORAL ONCE
Refills: 0 | Status: COMPLETED | OUTPATIENT
Start: 2022-02-10 | End: 2022-02-10

## 2022-02-10 RX ADMIN — HEPARIN SODIUM 5000 UNIT(S): 5000 INJECTION INTRAVENOUS; SUBCUTANEOUS at 14:06

## 2022-02-10 RX ADMIN — Medication 6: at 09:15

## 2022-02-10 RX ADMIN — TRAMADOL HYDROCHLORIDE 25 MILLIGRAM(S): 50 TABLET ORAL at 21:06

## 2022-02-10 RX ADMIN — Medication 50 MILLIGRAM(S): at 22:48

## 2022-02-10 RX ADMIN — Medication 4: at 22:10

## 2022-02-10 RX ADMIN — Medication 650 MILLIGRAM(S): at 23:30

## 2022-02-10 RX ADMIN — Medication 650 MILLIGRAM(S): at 06:41

## 2022-02-10 RX ADMIN — Medication 75 MILLIGRAM(S): at 22:12

## 2022-02-10 RX ADMIN — Medication 1 SPRAY(S): at 18:05

## 2022-02-10 RX ADMIN — CEFTRIAXONE 100 MILLIGRAM(S): 500 INJECTION, POWDER, FOR SOLUTION INTRAMUSCULAR; INTRAVENOUS at 10:43

## 2022-02-10 RX ADMIN — INSULIN GLARGINE 13 UNIT(S): 100 INJECTION, SOLUTION SUBCUTANEOUS at 22:10

## 2022-02-10 RX ADMIN — Medication 50 MICROGRAM(S): at 06:41

## 2022-02-10 RX ADMIN — Medication 100 GRAM(S): at 11:43

## 2022-02-10 RX ADMIN — Medication 5 MILLIGRAM(S): at 22:12

## 2022-02-10 RX ADMIN — HEPARIN SODIUM 5000 UNIT(S): 5000 INJECTION INTRAVENOUS; SUBCUTANEOUS at 06:43

## 2022-02-10 RX ADMIN — Medication 650 MILLIGRAM(S): at 14:43

## 2022-02-10 RX ADMIN — GABAPENTIN 100 MILLIGRAM(S): 400 CAPSULE ORAL at 14:06

## 2022-02-10 RX ADMIN — Medication 2: at 17:54

## 2022-02-10 RX ADMIN — Medication 75 MILLIGRAM(S): at 14:05

## 2022-02-10 RX ADMIN — Medication 650 MILLIGRAM(S): at 22:47

## 2022-02-10 RX ADMIN — Medication 650 MILLIGRAM(S): at 22:11

## 2022-02-10 RX ADMIN — DULOXETINE HYDROCHLORIDE 30 MILLIGRAM(S): 30 CAPSULE, DELAYED RELEASE ORAL at 22:12

## 2022-02-10 RX ADMIN — HEPARIN SODIUM 5000 UNIT(S): 5000 INJECTION INTRAVENOUS; SUBCUTANEOUS at 22:11

## 2022-02-10 RX ADMIN — TRAMADOL HYDROCHLORIDE 25 MILLIGRAM(S): 50 TABLET ORAL at 22:10

## 2022-02-10 RX ADMIN — Medication 3 UNIT(S): at 09:16

## 2022-02-10 RX ADMIN — Medication 325 MILLIGRAM(S): at 11:43

## 2022-02-10 RX ADMIN — PANTOPRAZOLE SODIUM 40 MILLIGRAM(S): 20 TABLET, DELAYED RELEASE ORAL at 06:41

## 2022-02-10 RX ADMIN — Medication 5 UNIT(S): at 17:53

## 2022-02-10 RX ADMIN — Medication 5 UNIT(S): at 12:30

## 2022-02-10 RX ADMIN — TAMSULOSIN HYDROCHLORIDE 0.4 MILLIGRAM(S): 0.4 CAPSULE ORAL at 22:14

## 2022-02-10 RX ADMIN — AMLODIPINE BESYLATE 10 MILLIGRAM(S): 2.5 TABLET ORAL at 06:42

## 2022-02-10 RX ADMIN — Medication 75 MILLIGRAM(S): at 06:42

## 2022-02-10 RX ADMIN — ATORVASTATIN CALCIUM 10 MILLIGRAM(S): 80 TABLET, FILM COATED ORAL at 22:11

## 2022-02-10 NOTE — PROGRESS NOTE ADULT - PROBLEM SELECTOR PLAN 1
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 Patient with multiple recent admissions for acute renal failure with hyperkalemia, the last 2 times requiring emergent dialysis. Hyperkalemia resolved without dialysis this admission. Acidosis now resolved. CT abd/pelvis without obvious hydronephrosis or stone. Baseline Cr 1-1.2.    - c/w PO bicarb tabs 650mg q8  - avoid nephrotoxic medications and renally dose medications  - strict I/Os; goal keep net even (can give IV NS if needed)  - renal following, recs appreciated  - F/u BUN and creatinine daily

## 2022-02-10 NOTE — PROGRESS NOTE ADULT - PROBLEM SELECTOR PLAN 4
home meds: hydralazine 50mg q8hrs, norvasc 10mg qd    - c/w home norvasc 10mg daily  - increased hydralazine 75mg q8hr

## 2022-02-10 NOTE — PROGRESS NOTE ADULT - PROBLEM SELECTOR PLAN 2
Hypothermic w/WBC 22 on admission. Lactate 3 on admission now resolved. CT head, chest and abd/pelvis wnl. UA positive. Purulent urine in [place]. Severe sepsis likely 2/2 UTI. Normothermic and HDS now.  - UCX 1/6/22: klebsiella pneumonia   - BCxs 2/6 NGTD  - c/w ceftriaxone 1g q24 (today 2/10 day 5) x 7 day (can d/c on cefpodoxime)

## 2022-02-10 NOTE — PROGRESS NOTE ADULT - PROBLEM SELECTOR PLAN 9
-c/w home tamsulosin 0.4mg qhs    # Insomnia  Patient at home on trazodone 100 mg HS.   - holding, patient not complaining of insomnia

## 2022-02-10 NOTE — PROGRESS NOTE ADULT - PROBLEM SELECTOR PLAN 10
home meds: Janumet 50 mg-500 mg BID. Gabapentin for peripheral neuropathy discontinued last admission.  A1C 6.7  -hold oral diabetes medications  -c/w mISS  -c/w lispro 3, lantus 13 - increased based on sliding scale requirements      #peripheral neuropathy   Patient at home on Gabapentin 300 mg TID and Duloxetine 30 mg HS.  - restart home duloxetine       #Hypothyroidism  -c/w home synthroid 50mcg qd home meds: Janumet 50 mg-500 mg BID. Gabapentin for peripheral neuropathy discontinued last admission.  A1C 6.7  -hold oral diabetes medications  -c/w mISS  -c/w lispro 5, lantus 13 - increased based on sliding scale requirements      #peripheral neuropathy   Patient at home on Gabapentin 300 mg TID and Duloxetine 30 mg HS.  - restart home duloxetine       #Hypothyroidism  -c/w home synthroid 50mcg qd home meds: Janumet 50 mg-500 mg BID. Gabapentin for peripheral neuropathy discontinued last admission.  A1C 6.7  -hold oral diabetes medications  -c/w mISS  -c/w lispro 5, lantus 13 - increased based on sliding scale requirements      #peripheral neuropathy   Patient at home on Gabapentin 300 mg TID and Duloxetine 30 mg HS.  - restart home duloxetine 30 mg HS  - started gabapentin 100 mg q8hrs (creat cl 49, can go up to 900mg/daily)      #Hypothyroidism  -c/w home synthroid 50mcg qd

## 2022-02-11 LAB
ALBUMIN SERPL ELPH-MCNC: 4.2 G/DL — SIGNIFICANT CHANGE UP (ref 3.3–5)
ALP SERPL-CCNC: 71 U/L — SIGNIFICANT CHANGE UP (ref 40–120)
ALT FLD-CCNC: 10 U/L — SIGNIFICANT CHANGE UP (ref 10–45)
ANION GAP SERPL CALC-SCNC: 14 MMOL/L — SIGNIFICANT CHANGE UP (ref 5–17)
AST SERPL-CCNC: 14 U/L — SIGNIFICANT CHANGE UP (ref 10–40)
BILIRUB SERPL-MCNC: 0.2 MG/DL — SIGNIFICANT CHANGE UP (ref 0.2–1.2)
BUN SERPL-MCNC: 21 MG/DL — SIGNIFICANT CHANGE UP (ref 7–23)
CALCIUM SERPL-MCNC: 9.8 MG/DL — SIGNIFICANT CHANGE UP (ref 8.4–10.5)
CHLORIDE SERPL-SCNC: 105 MMOL/L — SIGNIFICANT CHANGE UP (ref 96–108)
CO2 SERPL-SCNC: 21 MMOL/L — LOW (ref 22–31)
CREAT ?TM UR-MCNC: 31 MG/DL — SIGNIFICANT CHANGE UP
CREAT SERPL-MCNC: 1.39 MG/DL — HIGH (ref 0.5–1.3)
CULTURE RESULTS: SIGNIFICANT CHANGE UP
CULTURE RESULTS: SIGNIFICANT CHANGE UP
GLUCOSE BLDC GLUCOMTR-MCNC: 114 MG/DL — HIGH (ref 70–99)
GLUCOSE BLDC GLUCOMTR-MCNC: 157 MG/DL — HIGH (ref 70–99)
GLUCOSE BLDC GLUCOMTR-MCNC: 167 MG/DL — HIGH (ref 70–99)
GLUCOSE BLDC GLUCOMTR-MCNC: 195 MG/DL — HIGH (ref 70–99)
GLUCOSE SERPL-MCNC: 116 MG/DL — HIGH (ref 70–99)
HCT VFR BLD CALC: 28.1 % — LOW (ref 39–50)
HGB BLD-MCNC: 9.2 G/DL — LOW (ref 13–17)
MAGNESIUM SERPL-MCNC: 1.9 MG/DL — SIGNIFICANT CHANGE UP (ref 1.6–2.6)
MCHC RBC-ENTMCNC: 30.4 PG — SIGNIFICANT CHANGE UP (ref 27–34)
MCHC RBC-ENTMCNC: 32.7 GM/DL — SIGNIFICANT CHANGE UP (ref 32–36)
MCV RBC AUTO: 92.7 FL — SIGNIFICANT CHANGE UP (ref 80–100)
NRBC # BLD: 0 /100 WBCS — SIGNIFICANT CHANGE UP (ref 0–0)
PHOSPHATE SERPL-MCNC: 3.7 MG/DL — SIGNIFICANT CHANGE UP (ref 2.5–4.5)
PLATELET # BLD AUTO: 148 K/UL — LOW (ref 150–400)
POTASSIUM SERPL-MCNC: 4.3 MMOL/L — SIGNIFICANT CHANGE UP (ref 3.5–5.3)
POTASSIUM SERPL-SCNC: 4.3 MMOL/L — SIGNIFICANT CHANGE UP (ref 3.5–5.3)
PROT SERPL-MCNC: 7.4 G/DL — SIGNIFICANT CHANGE UP (ref 6–8.3)
RBC # BLD: 3.03 M/UL — LOW (ref 4.2–5.8)
RBC # FLD: 14 % — SIGNIFICANT CHANGE UP (ref 10.3–14.5)
SODIUM SERPL-SCNC: 140 MMOL/L — SIGNIFICANT CHANGE UP (ref 135–145)
SODIUM UR-SCNC: 61 MMOL/L — SIGNIFICANT CHANGE UP
SPECIMEN SOURCE: SIGNIFICANT CHANGE UP
SPECIMEN SOURCE: SIGNIFICANT CHANGE UP
WBC # BLD: 4.17 K/UL — SIGNIFICANT CHANGE UP (ref 3.8–10.5)
WBC # FLD AUTO: 4.17 K/UL — SIGNIFICANT CHANGE UP (ref 3.8–10.5)

## 2022-02-11 PROCEDURE — 99232 SBSQ HOSP IP/OBS MODERATE 35: CPT

## 2022-02-11 RX ORDER — ACETAMINOPHEN 500 MG
650 TABLET ORAL EVERY 6 HOURS
Refills: 0 | Status: DISCONTINUED | OUTPATIENT
Start: 2022-02-11 | End: 2022-02-16

## 2022-02-11 RX ORDER — TRAZODONE HCL 50 MG
50 TABLET ORAL AT BEDTIME
Refills: 0 | Status: DISCONTINUED | OUTPATIENT
Start: 2022-02-11 | End: 2022-02-12

## 2022-02-11 RX ADMIN — Medication 650 MILLIGRAM(S): at 13:32

## 2022-02-11 RX ADMIN — Medication 1 SPRAY(S): at 16:53

## 2022-02-11 RX ADMIN — Medication 5 UNIT(S): at 12:33

## 2022-02-11 RX ADMIN — Medication 50 MILLIGRAM(S): at 21:09

## 2022-02-11 RX ADMIN — HEPARIN SODIUM 5000 UNIT(S): 5000 INJECTION INTRAVENOUS; SUBCUTANEOUS at 21:09

## 2022-02-11 RX ADMIN — Medication 5 UNIT(S): at 17:16

## 2022-02-11 RX ADMIN — Medication 2: at 17:15

## 2022-02-11 RX ADMIN — Medication 650 MILLIGRAM(S): at 21:08

## 2022-02-11 RX ADMIN — Medication 650 MILLIGRAM(S): at 21:09

## 2022-02-11 RX ADMIN — Medication 650 MILLIGRAM(S): at 06:18

## 2022-02-11 RX ADMIN — ATORVASTATIN CALCIUM 10 MILLIGRAM(S): 80 TABLET, FILM COATED ORAL at 21:09

## 2022-02-11 RX ADMIN — Medication 650 MILLIGRAM(S): at 12:32

## 2022-02-11 RX ADMIN — Medication 325 MILLIGRAM(S): at 11:18

## 2022-02-11 RX ADMIN — PANTOPRAZOLE SODIUM 40 MILLIGRAM(S): 20 TABLET, DELAYED RELEASE ORAL at 06:18

## 2022-02-11 RX ADMIN — HEPARIN SODIUM 5000 UNIT(S): 5000 INJECTION INTRAVENOUS; SUBCUTANEOUS at 14:45

## 2022-02-11 RX ADMIN — HEPARIN SODIUM 5000 UNIT(S): 5000 INJECTION INTRAVENOUS; SUBCUTANEOUS at 06:18

## 2022-02-11 RX ADMIN — Medication 2: at 21:52

## 2022-02-11 RX ADMIN — Medication 650 MILLIGRAM(S): at 14:44

## 2022-02-11 RX ADMIN — DULOXETINE HYDROCHLORIDE 30 MILLIGRAM(S): 30 CAPSULE, DELAYED RELEASE ORAL at 21:07

## 2022-02-11 RX ADMIN — INSULIN GLARGINE 13 UNIT(S): 100 INJECTION, SOLUTION SUBCUTANEOUS at 21:53

## 2022-02-11 RX ADMIN — Medication 5 MILLIGRAM(S): at 21:08

## 2022-02-11 RX ADMIN — AMLODIPINE BESYLATE 10 MILLIGRAM(S): 2.5 TABLET ORAL at 06:18

## 2022-02-11 RX ADMIN — Medication 1 SPRAY(S): at 06:23

## 2022-02-11 RX ADMIN — Medication 2: at 12:32

## 2022-02-11 RX ADMIN — Medication 75 MILLIGRAM(S): at 06:18

## 2022-02-11 RX ADMIN — Medication 75 MILLIGRAM(S): at 14:45

## 2022-02-11 RX ADMIN — CEFTRIAXONE 100 MILLIGRAM(S): 500 INJECTION, POWDER, FOR SOLUTION INTRAMUSCULAR; INTRAVENOUS at 11:15

## 2022-02-11 RX ADMIN — Medication 5 UNIT(S): at 08:33

## 2022-02-11 RX ADMIN — Medication 650 MILLIGRAM(S): at 22:10

## 2022-02-11 RX ADMIN — TAMSULOSIN HYDROCHLORIDE 0.4 MILLIGRAM(S): 0.4 CAPSULE ORAL at 21:09

## 2022-02-11 RX ADMIN — Medication 50 MICROGRAM(S): at 06:18

## 2022-02-11 RX ADMIN — Medication 75 MILLIGRAM(S): at 21:08

## 2022-02-11 NOTE — PROGRESS NOTE ADULT - SUBJECTIVE AND OBJECTIVE BOX
INTERVAL HPI/OVERNIGHT EVENTS:    SUBJECTIVE: Patient seen and examined at bedside.     OBJECTIVE:    VITAL SIGNS:  ICU Vital Signs Last 24 Hrs  T(C): 36.4 (11 Feb 2022 06:10), Max: 36.7 (10 Feb 2022 20:53)  T(F): 97.5 (11 Feb 2022 06:10), Max: 98.1 (10 Feb 2022 20:53)  HR: 75 (11 Feb 2022 06:10) (75 - 90)  BP: 138/67 (11 Feb 2022 06:10) (138/67 - 161/72)  BP(mean): --  ABP: --  ABP(mean): --  RR: 17 (11 Feb 2022 06:10) (16 - 18)  SpO2: 97% (11 Feb 2022 06:10) (97% - 99%)        02-10 @ 07:01  -  02-11 @ 07:00  --------------------------------------------------------  IN: 0 mL / OUT: 400 mL / NET: -400 mL      CAPILLARY BLOOD GLUCOSE      POCT Blood Glucose.: 212 mg/dL (10 Feb 2022 21:47)      PHYSICAL EXAM:    Constitutional: NAD, well-groomed, well-developed  HEENT: PERRLA, EOMI, Normal Hearing, MMM  Neck: No LAD, No JVD  Back: Normal spine flexure, No CVA tenderness  Respiratory: CTAB   Cardiovascular: S1 and S2, RRR, no M/G/R  Gastrointestinal: BS+, soft, NT/ND  Extremities: No peripheral edema  Vascular: 2+ peripheral pulses  Neurological: A/O x 3, no focal deficits  Psychiatric: Normal mood, normal affect  Musculoskeletal: 5/5 strength b/l upper and lower extremities  Skin: No rashes    MEDICATIONS:  MEDICATIONS  (STANDING):  amLODIPine   Tablet 10 milliGRAM(s) Oral daily  atorvastatin 10 milliGRAM(s) Oral at bedtime  cefTRIAXone   IVPB 1000 milliGRAM(s) IV Intermittent every 24 hours  dextrose 40% Gel 15 Gram(s) Oral once  dextrose 5%. 1000 milliLiter(s) (50 mL/Hr) IV Continuous <Continuous>  dextrose 5%. 1000 milliLiter(s) (100 mL/Hr) IV Continuous <Continuous>  dextrose 50% Injectable 25 Gram(s) IV Push once  dextrose 50% Injectable 12.5 Gram(s) IV Push once  dextrose 50% Injectable 25 Gram(s) IV Push once  DULoxetine 30 milliGRAM(s) Oral at bedtime  ferrous    sulfate 325 milliGRAM(s) Oral daily  fluticasone propionate 50 MICROgram(s)/spray Nasal Spray 1 Spray(s) Both Nostrils two times a day  glucagon  Injectable 1 milliGRAM(s) IntraMuscular once  glucagon  Injectable 1 milliGRAM(s) IntraMuscular once  heparin   Injectable 5000 Unit(s) SubCutaneous every 8 hours  hydrALAZINE 75 milliGRAM(s) Oral every 8 hours  insulin glargine Injectable (LANTUS) 13 Unit(s) SubCutaneous at bedtime  insulin lispro (ADMELOG) corrective regimen sliding scale   SubCutaneous Before meals and at bedtime  insulin lispro Injectable (ADMELOG) 5 Unit(s) SubCutaneous three times a day before meals  levothyroxine 50 MICROGram(s) Oral daily  melatonin 5 milliGRAM(s) Oral at bedtime  pantoprazole    Tablet 40 milliGRAM(s) Oral before breakfast  sodium bicarbonate 650 milliGRAM(s) Oral every 8 hours  tamsulosin 0.4 milliGRAM(s) Oral at bedtime    MEDICATIONS  (PRN):      ALLERGIES:  Allergies    No Known Allergies    Intolerances        LABS:                        9.2    4.17  )-----------( 148      ( 11 Feb 2022 06:53 )             28.1     02-11    140  |  105  |  21  ----------------------------<  116<H>  4.3   |  x   |  x     Ca    9.8      11 Feb 2022 06:53  Phos  3.7     02-11  Mg     1.9     02-11            RADIOLOGY & ADDITIONAL TESTS: Reviewed. INTERVAL HPI/OVERNIGHT EVENTS: Gave tramadol 25mg for pain. Later in night, gave acetaminophen and trazodone 50 mg HS x1    SUBJECTIVE: Patient seen and examined at bedside. Patient reports improvement in the nasal congestion and reports improvement in the bl neuropathy with the night medication. No other concerns nor complaints. Rest of ROS negative.     OBJECTIVE:    VITAL SIGNS:  ICU Vital Signs Last 24 Hrs  T(C): 36.4 (11 Feb 2022 06:10), Max: 36.7 (10 Feb 2022 20:53)  T(F): 97.5 (11 Feb 2022 06:10), Max: 98.1 (10 Feb 2022 20:53)  HR: 75 (11 Feb 2022 06:10) (75 - 90)  BP: 138/67 (11 Feb 2022 06:10) (138/67 - 161/72)  BP(mean): --  ABP: --  ABP(mean): --  RR: 17 (11 Feb 2022 06:10) (16 - 18)  SpO2: 97% (11 Feb 2022 06:10) (97% - 99%)        02-10 @ 07:01  -  02-11 @ 07:00  --------------------------------------------------------  IN: 0 mL / OUT: 400 mL / NET: -400 mL      CAPILLARY BLOOD GLUCOSE      POCT Blood Glucose.: 212 mg/dL (10 Feb 2022 21:47)      PHYSICAL EXAM:    General: NAD, sitting up in bed, conversational  HEENT: MMM  Cardiovascular: +S1/S2; RRR  Respiratory: CTA B/L; no W/R/R  Gastrointestinal: ileostomy/colostomy bag, no TTP   Extremities: WWP; no edema, clubbing or cyanosis  Vascular: 2+ radial, DP/PT pulses B/L  Neurological: AAOx2; no focal deficits    MEDICATIONS:  MEDICATIONS  (STANDING):  amLODIPine   Tablet 10 milliGRAM(s) Oral daily  atorvastatin 10 milliGRAM(s) Oral at bedtime  cefTRIAXone   IVPB 1000 milliGRAM(s) IV Intermittent every 24 hours  dextrose 40% Gel 15 Gram(s) Oral once  dextrose 5%. 1000 milliLiter(s) (50 mL/Hr) IV Continuous <Continuous>  dextrose 5%. 1000 milliLiter(s) (100 mL/Hr) IV Continuous <Continuous>  dextrose 50% Injectable 25 Gram(s) IV Push once  dextrose 50% Injectable 12.5 Gram(s) IV Push once  dextrose 50% Injectable 25 Gram(s) IV Push once  DULoxetine 30 milliGRAM(s) Oral at bedtime  ferrous    sulfate 325 milliGRAM(s) Oral daily  fluticasone propionate 50 MICROgram(s)/spray Nasal Spray 1 Spray(s) Both Nostrils two times a day  glucagon  Injectable 1 milliGRAM(s) IntraMuscular once  glucagon  Injectable 1 milliGRAM(s) IntraMuscular once  heparin   Injectable 5000 Unit(s) SubCutaneous every 8 hours  hydrALAZINE 75 milliGRAM(s) Oral every 8 hours  insulin glargine Injectable (LANTUS) 13 Unit(s) SubCutaneous at bedtime  insulin lispro (ADMELOG) corrective regimen sliding scale   SubCutaneous Before meals and at bedtime  insulin lispro Injectable (ADMELOG) 5 Unit(s) SubCutaneous three times a day before meals  levothyroxine 50 MICROGram(s) Oral daily  melatonin 5 milliGRAM(s) Oral at bedtime  pantoprazole    Tablet 40 milliGRAM(s) Oral before breakfast  sodium bicarbonate 650 milliGRAM(s) Oral every 8 hours  tamsulosin 0.4 milliGRAM(s) Oral at bedtime    MEDICATIONS  (PRN):      ALLERGIES:  Allergies    No Known Allergies    Intolerances        LABS:                        9.2    4.17  )-----------( 148      ( 11 Feb 2022 06:53 )             28.1     02-11    140  |  105  |  21  ----------------------------<  116<H>  4.3   |  x   |  x     Ca    9.8      11 Feb 2022 06:53  Phos  3.7     02-11  Mg     1.9     02-11            RADIOLOGY & ADDITIONAL TESTS: Reviewed.

## 2022-02-11 NOTE — PROGRESS NOTE ADULT - PROBLEM SELECTOR PLAN 4
home meds: hydralazine 50mg q8hrs, norvasc 10mg qd    - c/w home norvasc 10mg daily  - c/w hydralazine 75mg q8hr

## 2022-02-11 NOTE — PROGRESS NOTE ADULT - PROBLEM SELECTOR PLAN 9
-c/w home tamsulosin 0.4mg qhs    # Insomnia  Patient at home on trazodone 100 mg HS.   - started on trazodone 50 mh HS

## 2022-02-11 NOTE — PROGRESS NOTE ADULT - PROBLEM SELECTOR PLAN 2
Hypothermic w/WBC 22 on admission. Lactate 3 on admission now resolved. CT head, chest and abd/pelvis wnl. UA positive. Purulent urine in [place]. Severe sepsis likely 2/2 UTI. Normothermic and HDS now.  - UCX 1/6/22: klebsiella pneumonia   - BCxs 2/6 NGTD  - c/w ceftriaxone 1g q24 (can d/c on cefpodoxime)

## 2022-02-11 NOTE — PROGRESS NOTE ADULT - PROBLEM SELECTOR PLAN 10
home meds: Janumet 50 mg-500 mg BID. Gabapentin for peripheral neuropathy discontinued last admission.  A1C 6.7  -hold oral diabetes medications  -c/w mISS  -c/w lispro 5, lantus 13 - increased based on sliding scale requirements      #peripheral neuropathy   Patient at home on Gabapentin 300 mg TID and Duloxetine 30 mg HS.  - c/w home duloxetine 30 mg HS  - discontinue completely gabapentin       #Hypothyroidism  -c/w home synthroid 50mcg qd

## 2022-02-12 LAB
ALBUMIN SERPL ELPH-MCNC: 3.9 G/DL — SIGNIFICANT CHANGE UP (ref 3.3–5)
ALP SERPL-CCNC: 62 U/L — SIGNIFICANT CHANGE UP (ref 40–120)
ALT FLD-CCNC: 9 U/L — LOW (ref 10–45)
ANION GAP SERPL CALC-SCNC: 13 MMOL/L — SIGNIFICANT CHANGE UP (ref 5–17)
AST SERPL-CCNC: 14 U/L — SIGNIFICANT CHANGE UP (ref 10–40)
BILIRUB SERPL-MCNC: 0.3 MG/DL — SIGNIFICANT CHANGE UP (ref 0.2–1.2)
BUN SERPL-MCNC: 21 MG/DL — SIGNIFICANT CHANGE UP (ref 7–23)
CALCIUM SERPL-MCNC: 9.5 MG/DL — SIGNIFICANT CHANGE UP (ref 8.4–10.5)
CHLORIDE SERPL-SCNC: 108 MMOL/L — SIGNIFICANT CHANGE UP (ref 96–108)
CO2 SERPL-SCNC: 19 MMOL/L — LOW (ref 22–31)
CREAT SERPL-MCNC: 1.33 MG/DL — HIGH (ref 0.5–1.3)
GLUCOSE BLDC GLUCOMTR-MCNC: 120 MG/DL — HIGH (ref 70–99)
GLUCOSE BLDC GLUCOMTR-MCNC: 135 MG/DL — HIGH (ref 70–99)
GLUCOSE BLDC GLUCOMTR-MCNC: 275 MG/DL — HIGH (ref 70–99)
GLUCOSE BLDC GLUCOMTR-MCNC: 92 MG/DL — SIGNIFICANT CHANGE UP (ref 70–99)
GLUCOSE SERPL-MCNC: 84 MG/DL — SIGNIFICANT CHANGE UP (ref 70–99)
HCT VFR BLD CALC: 27.9 % — LOW (ref 39–50)
HGB BLD-MCNC: 9.2 G/DL — LOW (ref 13–17)
MAGNESIUM SERPL-MCNC: 1.8 MG/DL — SIGNIFICANT CHANGE UP (ref 1.6–2.6)
MCHC RBC-ENTMCNC: 31.1 PG — SIGNIFICANT CHANGE UP (ref 27–34)
MCHC RBC-ENTMCNC: 33 GM/DL — SIGNIFICANT CHANGE UP (ref 32–36)
MCV RBC AUTO: 94.3 FL — SIGNIFICANT CHANGE UP (ref 80–100)
NRBC # BLD: 0 /100 WBCS — SIGNIFICANT CHANGE UP (ref 0–0)
PHOSPHATE SERPL-MCNC: 4.1 MG/DL — SIGNIFICANT CHANGE UP (ref 2.5–4.5)
PLATELET # BLD AUTO: 143 K/UL — LOW (ref 150–400)
POTASSIUM SERPL-MCNC: 4.2 MMOL/L — SIGNIFICANT CHANGE UP (ref 3.5–5.3)
POTASSIUM SERPL-SCNC: 4.2 MMOL/L — SIGNIFICANT CHANGE UP (ref 3.5–5.3)
PROT SERPL-MCNC: 7.1 G/DL — SIGNIFICANT CHANGE UP (ref 6–8.3)
RBC # BLD: 2.96 M/UL — LOW (ref 4.2–5.8)
RBC # FLD: 14 % — SIGNIFICANT CHANGE UP (ref 10.3–14.5)
SARS-COV-2 RNA SPEC QL NAA+PROBE: SIGNIFICANT CHANGE UP
SODIUM SERPL-SCNC: 140 MMOL/L — SIGNIFICANT CHANGE UP (ref 135–145)
WBC # BLD: 3.62 K/UL — LOW (ref 3.8–10.5)
WBC # FLD AUTO: 3.62 K/UL — LOW (ref 3.8–10.5)

## 2022-02-12 PROCEDURE — 99232 SBSQ HOSP IP/OBS MODERATE 35: CPT

## 2022-02-12 RX ORDER — MAGNESIUM SULFATE 500 MG/ML
1 VIAL (ML) INJECTION ONCE
Refills: 0 | Status: COMPLETED | OUTPATIENT
Start: 2022-02-12 | End: 2022-02-12

## 2022-02-12 RX ORDER — TRAZODONE HCL 50 MG
100 TABLET ORAL AT BEDTIME
Refills: 0 | Status: DISCONTINUED | OUTPATIENT
Start: 2022-02-12 | End: 2022-02-16

## 2022-02-12 RX ORDER — TRAZODONE HCL 50 MG
50 TABLET ORAL ONCE
Refills: 0 | Status: COMPLETED | OUTPATIENT
Start: 2022-02-12 | End: 2022-02-12

## 2022-02-12 RX ADMIN — Medication 5 UNIT(S): at 13:33

## 2022-02-12 RX ADMIN — Medication 50 MICROGRAM(S): at 06:32

## 2022-02-12 RX ADMIN — Medication 100 GRAM(S): at 09:09

## 2022-02-12 RX ADMIN — Medication 75 MILLIGRAM(S): at 13:12

## 2022-02-12 RX ADMIN — Medication 650 MILLIGRAM(S): at 06:32

## 2022-02-12 RX ADMIN — Medication 650 MILLIGRAM(S): at 16:30

## 2022-02-12 RX ADMIN — DULOXETINE HYDROCHLORIDE 30 MILLIGRAM(S): 30 CAPSULE, DELAYED RELEASE ORAL at 22:20

## 2022-02-12 RX ADMIN — Medication 5 UNIT(S): at 09:08

## 2022-02-12 RX ADMIN — Medication 6: at 17:37

## 2022-02-12 RX ADMIN — Medication 50 MILLIGRAM(S): at 00:37

## 2022-02-12 RX ADMIN — Medication 650 MILLIGRAM(S): at 15:42

## 2022-02-12 RX ADMIN — ATORVASTATIN CALCIUM 10 MILLIGRAM(S): 80 TABLET, FILM COATED ORAL at 22:20

## 2022-02-12 RX ADMIN — Medication 325 MILLIGRAM(S): at 13:06

## 2022-02-12 RX ADMIN — Medication 75 MILLIGRAM(S): at 06:32

## 2022-02-12 RX ADMIN — INSULIN GLARGINE 13 UNIT(S): 100 INJECTION, SOLUTION SUBCUTANEOUS at 22:20

## 2022-02-12 RX ADMIN — HEPARIN SODIUM 5000 UNIT(S): 5000 INJECTION INTRAVENOUS; SUBCUTANEOUS at 06:33

## 2022-02-12 RX ADMIN — Medication 650 MILLIGRAM(S): at 23:03

## 2022-02-12 RX ADMIN — Medication 75 MILLIGRAM(S): at 22:21

## 2022-02-12 RX ADMIN — Medication 650 MILLIGRAM(S): at 13:12

## 2022-02-12 RX ADMIN — Medication 1 SPRAY(S): at 18:43

## 2022-02-12 RX ADMIN — Medication 50 MILLIGRAM(S): at 23:03

## 2022-02-12 RX ADMIN — Medication 650 MILLIGRAM(S): at 22:20

## 2022-02-12 RX ADMIN — HEPARIN SODIUM 5000 UNIT(S): 5000 INJECTION INTRAVENOUS; SUBCUTANEOUS at 22:21

## 2022-02-12 RX ADMIN — Medication 50 MILLIGRAM(S): at 22:20

## 2022-02-12 RX ADMIN — Medication 1 SPRAY(S): at 06:33

## 2022-02-12 RX ADMIN — Medication 5 UNIT(S): at 17:37

## 2022-02-12 RX ADMIN — TAMSULOSIN HYDROCHLORIDE 0.4 MILLIGRAM(S): 0.4 CAPSULE ORAL at 22:20

## 2022-02-12 RX ADMIN — CEFTRIAXONE 100 MILLIGRAM(S): 500 INJECTION, POWDER, FOR SOLUTION INTRAMUSCULAR; INTRAVENOUS at 13:06

## 2022-02-12 RX ADMIN — PANTOPRAZOLE SODIUM 40 MILLIGRAM(S): 20 TABLET, DELAYED RELEASE ORAL at 06:33

## 2022-02-12 RX ADMIN — AMLODIPINE BESYLATE 10 MILLIGRAM(S): 2.5 TABLET ORAL at 06:32

## 2022-02-12 RX ADMIN — HEPARIN SODIUM 5000 UNIT(S): 5000 INJECTION INTRAVENOUS; SUBCUTANEOUS at 13:12

## 2022-02-12 NOTE — PROGRESS NOTE ADULT - PROBLEM SELECTOR PLAN 2
Hypothermic w/WBC 22 on admission. Lactate 3 on admission now resolved. CT head, chest and abd/pelvis wnl. UA positive. Purulent urine in [place]. Severe sepsis likely 2/2 UTI. Normothermic and HDS now.  - UCX 1/6/22: klebsiella pneumonia   - BCxs 2/6 NGTD  - c/w cefpodoxime)

## 2022-02-12 NOTE — PROGRESS NOTE ADULT - SUBJECTIVE AND OBJECTIVE BOX
INTERVAL HPI/OVERNIGHT EVENTS: Gave tramadol 25mg for pain. Later in night, gave acetaminophen and trazodone 50 mg HS x1    SUBJECTIVE: Patient seen and examined at bedside. Patient reports improvement in the nasal congestion and reports improvement in the bl neuropathy with the night medication. No other concerns nor complaints. Rest of ROS negative.     OBJECTIVE: no complaints.    VITAL SIGNS:  stable        PHYSICAL EXAM:    General: NAD, sitting up in bed, conversational  HEENT: MMM  Cardiovascular: +S1/S2; RRR  Respiratory: CTA B/L; no W/R/R  Gastrointestinal: ileostomy/colostomy bag, no TTP   Extremities: WWP; no edema, clubbing or cyanosis  Vascular: 2+ radial, DP/PT pulses B/L  Neurological: AAOx2; no focal deficits      LABS:  reviewed. Cr improved.

## 2022-02-13 LAB
ANION GAP SERPL CALC-SCNC: 10 MMOL/L — SIGNIFICANT CHANGE UP (ref 5–17)
BASOPHILS # BLD AUTO: 0.03 K/UL — SIGNIFICANT CHANGE UP (ref 0–0.2)
BASOPHILS NFR BLD AUTO: 0.7 % — SIGNIFICANT CHANGE UP (ref 0–2)
BUN SERPL-MCNC: 22 MG/DL — SIGNIFICANT CHANGE UP (ref 7–23)
CALCIUM SERPL-MCNC: 9.2 MG/DL — SIGNIFICANT CHANGE UP (ref 8.4–10.5)
CHLORIDE SERPL-SCNC: 103 MMOL/L — SIGNIFICANT CHANGE UP (ref 96–108)
CO2 SERPL-SCNC: 22 MMOL/L — SIGNIFICANT CHANGE UP (ref 22–31)
CREAT SERPL-MCNC: 1.38 MG/DL — HIGH (ref 0.5–1.3)
EOSINOPHIL # BLD AUTO: 0.11 K/UL — SIGNIFICANT CHANGE UP (ref 0–0.5)
EOSINOPHIL NFR BLD AUTO: 2.5 % — SIGNIFICANT CHANGE UP (ref 0–6)
GLUCOSE BLDC GLUCOMTR-MCNC: 108 MG/DL — HIGH (ref 70–99)
GLUCOSE BLDC GLUCOMTR-MCNC: 125 MG/DL — HIGH (ref 70–99)
GLUCOSE BLDC GLUCOMTR-MCNC: 161 MG/DL — HIGH (ref 70–99)
GLUCOSE BLDC GLUCOMTR-MCNC: 80 MG/DL — SIGNIFICANT CHANGE UP (ref 70–99)
GLUCOSE SERPL-MCNC: 121 MG/DL — HIGH (ref 70–99)
HCT VFR BLD CALC: 26.7 % — LOW (ref 39–50)
HGB BLD-MCNC: 8.8 G/DL — LOW (ref 13–17)
IMM GRANULOCYTES NFR BLD AUTO: 0.2 % — SIGNIFICANT CHANGE UP (ref 0–1.5)
LYMPHOCYTES # BLD AUTO: 1.74 K/UL — SIGNIFICANT CHANGE UP (ref 1–3.3)
LYMPHOCYTES # BLD AUTO: 39 % — SIGNIFICANT CHANGE UP (ref 13–44)
MAGNESIUM SERPL-MCNC: 1.9 MG/DL — SIGNIFICANT CHANGE UP (ref 1.6–2.6)
MCHC RBC-ENTMCNC: 30.9 PG — SIGNIFICANT CHANGE UP (ref 27–34)
MCHC RBC-ENTMCNC: 33 GM/DL — SIGNIFICANT CHANGE UP (ref 32–36)
MCV RBC AUTO: 93.7 FL — SIGNIFICANT CHANGE UP (ref 80–100)
MONOCYTES # BLD AUTO: 0.41 K/UL — SIGNIFICANT CHANGE UP (ref 0–0.9)
MONOCYTES NFR BLD AUTO: 9.2 % — SIGNIFICANT CHANGE UP (ref 2–14)
NEUTROPHILS # BLD AUTO: 2.16 K/UL — SIGNIFICANT CHANGE UP (ref 1.8–7.4)
NEUTROPHILS NFR BLD AUTO: 48.4 % — SIGNIFICANT CHANGE UP (ref 43–77)
NRBC # BLD: 0 /100 WBCS — SIGNIFICANT CHANGE UP (ref 0–0)
PHOSPHATE SERPL-MCNC: 2.9 MG/DL — SIGNIFICANT CHANGE UP (ref 2.5–4.5)
PLATELET # BLD AUTO: 134 K/UL — LOW (ref 150–400)
POTASSIUM SERPL-MCNC: 4.3 MMOL/L — SIGNIFICANT CHANGE UP (ref 3.5–5.3)
POTASSIUM SERPL-SCNC: 4.3 MMOL/L — SIGNIFICANT CHANGE UP (ref 3.5–5.3)
RBC # BLD: 2.85 M/UL — LOW (ref 4.2–5.8)
RBC # FLD: 13.8 % — SIGNIFICANT CHANGE UP (ref 10.3–14.5)
SODIUM SERPL-SCNC: 135 MMOL/L — SIGNIFICANT CHANGE UP (ref 135–145)
WBC # BLD: 4.46 K/UL — SIGNIFICANT CHANGE UP (ref 3.8–10.5)
WBC # FLD AUTO: 4.46 K/UL — SIGNIFICANT CHANGE UP (ref 3.8–10.5)

## 2022-02-13 PROCEDURE — 99232 SBSQ HOSP IP/OBS MODERATE 35: CPT

## 2022-02-13 RX ORDER — SODIUM BICARBONATE 1 MEQ/ML
1 SYRINGE (ML) INTRAVENOUS
Qty: 0 | Refills: 0 | DISCHARGE
Start: 2022-02-13

## 2022-02-13 RX ORDER — HYDRALAZINE HCL 50 MG
3 TABLET ORAL
Qty: 270 | Refills: 3
Start: 2022-02-13 | End: 2022-06-12

## 2022-02-13 RX ORDER — ACETAMINOPHEN 500 MG
2 TABLET ORAL
Qty: 0 | Refills: 0 | DISCHARGE
Start: 2022-02-13

## 2022-02-13 RX ORDER — INSULIN GLARGINE 100 [IU]/ML
13 INJECTION, SOLUTION SUBCUTANEOUS
Qty: 0 | Refills: 0 | DISCHARGE
Start: 2022-02-13

## 2022-02-13 RX ORDER — INSULIN LISPRO 100/ML
5 VIAL (ML) SUBCUTANEOUS
Qty: 0 | Refills: 0 | DISCHARGE
Start: 2022-02-13

## 2022-02-13 RX ADMIN — ATORVASTATIN CALCIUM 10 MILLIGRAM(S): 80 TABLET, FILM COATED ORAL at 22:24

## 2022-02-13 RX ADMIN — Medication 650 MILLIGRAM(S): at 14:10

## 2022-02-13 RX ADMIN — AMLODIPINE BESYLATE 10 MILLIGRAM(S): 2.5 TABLET ORAL at 06:36

## 2022-02-13 RX ADMIN — Medication 100 MILLIGRAM(S): at 22:24

## 2022-02-13 RX ADMIN — Medication 650 MILLIGRAM(S): at 20:48

## 2022-02-13 RX ADMIN — Medication 5 UNIT(S): at 17:55

## 2022-02-13 RX ADMIN — TAMSULOSIN HYDROCHLORIDE 0.4 MILLIGRAM(S): 0.4 CAPSULE ORAL at 22:25

## 2022-02-13 RX ADMIN — Medication 2: at 17:55

## 2022-02-13 RX ADMIN — Medication 650 MILLIGRAM(S): at 15:15

## 2022-02-13 RX ADMIN — HEPARIN SODIUM 5000 UNIT(S): 5000 INJECTION INTRAVENOUS; SUBCUTANEOUS at 06:37

## 2022-02-13 RX ADMIN — Medication 650 MILLIGRAM(S): at 00:03

## 2022-02-13 RX ADMIN — Medication 75 MILLIGRAM(S): at 06:36

## 2022-02-13 RX ADMIN — INSULIN GLARGINE 13 UNIT(S): 100 INJECTION, SOLUTION SUBCUTANEOUS at 22:23

## 2022-02-13 RX ADMIN — Medication 5 UNIT(S): at 08:50

## 2022-02-13 RX ADMIN — DULOXETINE HYDROCHLORIDE 30 MILLIGRAM(S): 30 CAPSULE, DELAYED RELEASE ORAL at 22:23

## 2022-02-13 RX ADMIN — Medication 1 SPRAY(S): at 06:42

## 2022-02-13 RX ADMIN — PANTOPRAZOLE SODIUM 40 MILLIGRAM(S): 20 TABLET, DELAYED RELEASE ORAL at 06:36

## 2022-02-13 RX ADMIN — Medication 650 MILLIGRAM(S): at 14:09

## 2022-02-13 RX ADMIN — Medication 650 MILLIGRAM(S): at 06:36

## 2022-02-13 RX ADMIN — Medication 1 SPRAY(S): at 19:12

## 2022-02-13 RX ADMIN — Medication 75 MILLIGRAM(S): at 14:07

## 2022-02-13 RX ADMIN — HEPARIN SODIUM 5000 UNIT(S): 5000 INJECTION INTRAVENOUS; SUBCUTANEOUS at 14:08

## 2022-02-13 RX ADMIN — Medication 50 MICROGRAM(S): at 06:36

## 2022-02-13 RX ADMIN — Medication 650 MILLIGRAM(S): at 06:42

## 2022-02-13 RX ADMIN — Medication 650 MILLIGRAM(S): at 21:48

## 2022-02-13 RX ADMIN — Medication 650 MILLIGRAM(S): at 22:22

## 2022-02-13 RX ADMIN — Medication 5 UNIT(S): at 12:00

## 2022-02-13 RX ADMIN — Medication 650 MILLIGRAM(S): at 07:42

## 2022-02-13 RX ADMIN — Medication 75 MILLIGRAM(S): at 22:24

## 2022-02-13 RX ADMIN — Medication 325 MILLIGRAM(S): at 11:59

## 2022-02-13 RX ADMIN — HEPARIN SODIUM 5000 UNIT(S): 5000 INJECTION INTRAVENOUS; SUBCUTANEOUS at 22:23

## 2022-02-13 NOTE — PROGRESS NOTE ADULT - SUBJECTIVE AND OBJECTIVE BOX
INTERVAL HPI/OVERNIGHT EVENTS:    SUBJECTIVE: Patient seen and examined at bedside.     OBJECTIVE:    VITAL SIGNS:  ICU Vital Signs Last 24 Hrs  T(C): 36.8 (13 Feb 2022 05:25), Max: 36.8 (12 Feb 2022 12:45)  T(F): 98.3 (13 Feb 2022 05:25), Max: 98.3 (12 Feb 2022 12:45)  HR: 74 (13 Feb 2022 05:25) (68 - 75)  BP: 143/64 (13 Feb 2022 05:25) (143/64 - 157/67)  BP(mean): --  ABP: --  ABP(mean): --  RR: 18 (13 Feb 2022 05:25) (17 - 18)  SpO2: 98% (13 Feb 2022 05:25) (97% - 98%)        02-12 @ 07:01  -  02-13 @ 07:00  --------------------------------------------------------  IN: 0 mL / OUT: 1825 mL / NET: -1825 mL      CAPILLARY BLOOD GLUCOSE      POCT Blood Glucose.: 108 mg/dL (13 Feb 2022 12:07)      PHYSICAL EXAM:    Constitutional: NAD, well-groomed, well-developed  HEENT: PERRLA, EOMI, Normal Hearing, MMM  Neck: No LAD, No JVD  Back: Normal spine flexure, No CVA tenderness  Respiratory: CTAB   Cardiovascular: S1 and S2, RRR, no M/G/R  Gastrointestinal: BS+, soft, NT/ND  Extremities: No peripheral edema  Vascular: 2+ peripheral pulses  Neurological: A/O x 3, no focal deficits  Psychiatric: Normal mood, normal affect  Musculoskeletal: 5/5 strength b/l upper and lower extremities  Skin: No rashes    MEDICATIONS:  MEDICATIONS  (STANDING):  amLODIPine   Tablet 10 milliGRAM(s) Oral daily  atorvastatin 10 milliGRAM(s) Oral at bedtime  dextrose 40% Gel 15 Gram(s) Oral once  dextrose 5%. 1000 milliLiter(s) (50 mL/Hr) IV Continuous <Continuous>  dextrose 5%. 1000 milliLiter(s) (100 mL/Hr) IV Continuous <Continuous>  dextrose 50% Injectable 25 Gram(s) IV Push once  dextrose 50% Injectable 12.5 Gram(s) IV Push once  dextrose 50% Injectable 25 Gram(s) IV Push once  DULoxetine 30 milliGRAM(s) Oral at bedtime  ferrous    sulfate 325 milliGRAM(s) Oral daily  fluticasone propionate 50 MICROgram(s)/spray Nasal Spray 1 Spray(s) Both Nostrils two times a day  glucagon  Injectable 1 milliGRAM(s) IntraMuscular once  glucagon  Injectable 1 milliGRAM(s) IntraMuscular once  heparin   Injectable 5000 Unit(s) SubCutaneous every 8 hours  hydrALAZINE 75 milliGRAM(s) Oral every 8 hours  insulin glargine Injectable (LANTUS) 13 Unit(s) SubCutaneous at bedtime  insulin lispro (ADMELOG) corrective regimen sliding scale   SubCutaneous Before meals and at bedtime  insulin lispro Injectable (ADMELOG) 5 Unit(s) SubCutaneous three times a day before meals  levothyroxine 50 MICROGram(s) Oral daily  melatonin 5 milliGRAM(s) Oral at bedtime  pantoprazole    Tablet 40 milliGRAM(s) Oral before breakfast  sodium bicarbonate 650 milliGRAM(s) Oral every 8 hours  tamsulosin 0.4 milliGRAM(s) Oral at bedtime  traZODone 100 milliGRAM(s) Oral at bedtime    MEDICATIONS  (PRN):  acetaminophen     Tablet .. 650 milliGRAM(s) Oral every 6 hours PRN Temp greater or equal to 38C (100.4F), Mild Pain (1 - 3)      ALLERGIES:  Allergies    No Known Allergies    Intolerances        LABS:                        8.8    4.46  )-----------( 134      ( 13 Feb 2022 06:21 )             26.7     02-13    135  |  103  |  22  ----------------------------<  121<H>  4.3   |  22  |  1.38<H>    Ca    9.2      13 Feb 2022 06:21  Phos  2.9     02-13  Mg     1.9     02-13    TPro  7.1  /  Alb  3.9  /  TBili  0.3  /  DBili  x   /  AST  14  /  ALT  9<L>  /  AlkPhos  62  02-12          RADIOLOGY & ADDITIONAL TESTS: Reviewed. INTERVAL HPI/OVERNIGHT EVENTS: NAOE     SUBJECTIVE: Patient seen and examined at bedside. Patient reports having improvement in his sleep and in his pain. Reports no concerns, rest of ROS negative.     OBJECTIVE:    VITAL SIGNS:  ICU Vital Signs Last 24 Hrs  T(C): 36.8 (13 Feb 2022 05:25), Max: 36.8 (12 Feb 2022 12:45)  T(F): 98.3 (13 Feb 2022 05:25), Max: 98.3 (12 Feb 2022 12:45)  HR: 74 (13 Feb 2022 05:25) (68 - 75)  BP: 143/64 (13 Feb 2022 05:25) (143/64 - 157/67)  BP(mean): --  ABP: --  ABP(mean): --  RR: 18 (13 Feb 2022 05:25) (17 - 18)  SpO2: 98% (13 Feb 2022 05:25) (97% - 98%)        02-12 @ 07:01  -  02-13 @ 07:00  --------------------------------------------------------  IN: 0 mL / OUT: 1825 mL / NET: -1825 mL      CAPILLARY BLOOD GLUCOSE      POCT Blood Glucose.: 108 mg/dL (13 Feb 2022 12:07)      PHYSICAL EXAM:    General: NAD, sitting up in bed, conversational  HEENT: MMM  Cardiovascular: +S1/S2; RRR  Respiratory: CTA B/L; no W/R/R  Gastrointestinal: ileostomy/colostomy bag, no TTP   Extremities: WWP; no edema, clubbing or cyanosis  Vascular: 2+ radial, DP/PT pulses B/L  Neurological: AAOx2; no focal deficits    MEDICATIONS:  MEDICATIONS  (STANDING):  amLODIPine   Tablet 10 milliGRAM(s) Oral daily  atorvastatin 10 milliGRAM(s) Oral at bedtime  dextrose 40% Gel 15 Gram(s) Oral once  dextrose 5%. 1000 milliLiter(s) (50 mL/Hr) IV Continuous <Continuous>  dextrose 5%. 1000 milliLiter(s) (100 mL/Hr) IV Continuous <Continuous>  dextrose 50% Injectable 25 Gram(s) IV Push once  dextrose 50% Injectable 12.5 Gram(s) IV Push once  dextrose 50% Injectable 25 Gram(s) IV Push once  DULoxetine 30 milliGRAM(s) Oral at bedtime  ferrous    sulfate 325 milliGRAM(s) Oral daily  fluticasone propionate 50 MICROgram(s)/spray Nasal Spray 1 Spray(s) Both Nostrils two times a day  glucagon  Injectable 1 milliGRAM(s) IntraMuscular once  glucagon  Injectable 1 milliGRAM(s) IntraMuscular once  heparin   Injectable 5000 Unit(s) SubCutaneous every 8 hours  hydrALAZINE 75 milliGRAM(s) Oral every 8 hours  insulin glargine Injectable (LANTUS) 13 Unit(s) SubCutaneous at bedtime  insulin lispro (ADMELOG) corrective regimen sliding scale   SubCutaneous Before meals and at bedtime  insulin lispro Injectable (ADMELOG) 5 Unit(s) SubCutaneous three times a day before meals  levothyroxine 50 MICROGram(s) Oral daily  melatonin 5 milliGRAM(s) Oral at bedtime  pantoprazole    Tablet 40 milliGRAM(s) Oral before breakfast  sodium bicarbonate 650 milliGRAM(s) Oral every 8 hours  tamsulosin 0.4 milliGRAM(s) Oral at bedtime  traZODone 100 milliGRAM(s) Oral at bedtime    MEDICATIONS  (PRN):  acetaminophen     Tablet .. 650 milliGRAM(s) Oral every 6 hours PRN Temp greater or equal to 38C (100.4F), Mild Pain (1 - 3)      ALLERGIES:  Allergies    No Known Allergies    Intolerances        LABS:                        8.8    4.46  )-----------( 134      ( 13 Feb 2022 06:21 )             26.7     02-13    135  |  103  |  22  ----------------------------<  121<H>  4.3   |  22  |  1.38<H>    Ca    9.2      13 Feb 2022 06:21  Phos  2.9     02-13  Mg     1.9     02-13    TPro  7.1  /  Alb  3.9  /  TBili  0.3  /  DBili  x   /  AST  14  /  ALT  9<L>  /  AlkPhos  62  02-12          RADIOLOGY & ADDITIONAL TESTS: Reviewed.

## 2022-02-13 NOTE — PROGRESS NOTE ADULT - PROBLEM SELECTOR PLAN 9
-c/w home tamsulosin 0.4mg qhs    # Insomnia  Patient at home on trazodone 100 mg HS.   - c/w home trazodone 100 mh HS

## 2022-02-13 NOTE — PROGRESS NOTE ADULT - PROBLEM SELECTOR PLAN 10
home meds: Janumet 50 mg-500 mg BID.   A1C 6.7  -hold oral diabetes medications  -c/w mISS  -c/w lispro 5, lantus 13 - increased based on sliding scale requirements      #peripheral neuropathy   Patient at home on Gabapentin 300 mg TID and Duloxetine 30 mg HS.  - c/w home duloxetine 30 mg HS  - discontinue completely gabapentin       #Hypothyroidism  -c/w home synthroid 50mcg qd

## 2022-02-13 NOTE — PROGRESS NOTE ADULT - PROBLEM SELECTOR PLAN 2
Hypothermic w/WBC 22 on admission. Lactate 3 on admission now resolved. CT head, chest and abd/pelvis wnl. UA positive. Purulent urine in [place]. Severe sepsis likely 2/2 UTI. Normothermic and HDS now.  - UCX 1/6/22: klebsiella pneumonia   - BCxs 2/6 NGTD  - s/p ceftriaxone treatment x7 days

## 2022-02-14 ENCOUNTER — APPOINTMENT (OUTPATIENT)
Dept: NEPHROLOGY | Facility: CLINIC | Age: 62
End: 2022-02-14

## 2022-02-14 LAB
GLUCOSE BLDC GLUCOMTR-MCNC: 140 MG/DL — HIGH (ref 70–99)
GLUCOSE BLDC GLUCOMTR-MCNC: 178 MG/DL — HIGH (ref 70–99)
GLUCOSE BLDC GLUCOMTR-MCNC: 184 MG/DL — HIGH (ref 70–99)
GLUCOSE BLDC GLUCOMTR-MCNC: 203 MG/DL — HIGH (ref 70–99)

## 2022-02-14 PROCEDURE — 99232 SBSQ HOSP IP/OBS MODERATE 35: CPT

## 2022-02-14 RX ADMIN — TAMSULOSIN HYDROCHLORIDE 0.4 MILLIGRAM(S): 0.4 CAPSULE ORAL at 22:34

## 2022-02-14 RX ADMIN — Medication 5 UNIT(S): at 12:11

## 2022-02-14 RX ADMIN — DULOXETINE HYDROCHLORIDE 30 MILLIGRAM(S): 30 CAPSULE, DELAYED RELEASE ORAL at 22:35

## 2022-02-14 RX ADMIN — Medication 75 MILLIGRAM(S): at 07:15

## 2022-02-14 RX ADMIN — Medication 75 MILLIGRAM(S): at 22:34

## 2022-02-14 RX ADMIN — Medication 50 MICROGRAM(S): at 07:14

## 2022-02-14 RX ADMIN — PANTOPRAZOLE SODIUM 40 MILLIGRAM(S): 20 TABLET, DELAYED RELEASE ORAL at 07:15

## 2022-02-14 RX ADMIN — ATORVASTATIN CALCIUM 10 MILLIGRAM(S): 80 TABLET, FILM COATED ORAL at 22:34

## 2022-02-14 RX ADMIN — Medication 5 UNIT(S): at 16:50

## 2022-02-14 RX ADMIN — HEPARIN SODIUM 5000 UNIT(S): 5000 INJECTION INTRAVENOUS; SUBCUTANEOUS at 07:14

## 2022-02-14 RX ADMIN — Medication 100 MILLIGRAM(S): at 22:34

## 2022-02-14 RX ADMIN — Medication 650 MILLIGRAM(S): at 22:34

## 2022-02-14 RX ADMIN — INSULIN GLARGINE 13 UNIT(S): 100 INJECTION, SOLUTION SUBCUTANEOUS at 22:35

## 2022-02-14 RX ADMIN — Medication 650 MILLIGRAM(S): at 16:49

## 2022-02-14 RX ADMIN — HEPARIN SODIUM 5000 UNIT(S): 5000 INJECTION INTRAVENOUS; SUBCUTANEOUS at 16:00

## 2022-02-14 RX ADMIN — Medication 2: at 22:35

## 2022-02-14 RX ADMIN — Medication 650 MILLIGRAM(S): at 07:14

## 2022-02-14 RX ADMIN — Medication 5 MILLIGRAM(S): at 01:00

## 2022-02-14 RX ADMIN — Medication 650 MILLIGRAM(S): at 12:10

## 2022-02-14 RX ADMIN — Medication 2: at 09:05

## 2022-02-14 RX ADMIN — Medication 5 UNIT(S): at 09:04

## 2022-02-14 RX ADMIN — Medication 4: at 12:10

## 2022-02-14 RX ADMIN — HEPARIN SODIUM 5000 UNIT(S): 5000 INJECTION INTRAVENOUS; SUBCUTANEOUS at 22:35

## 2022-02-14 RX ADMIN — Medication 650 MILLIGRAM(S): at 11:39

## 2022-02-14 RX ADMIN — AMLODIPINE BESYLATE 10 MILLIGRAM(S): 2.5 TABLET ORAL at 07:14

## 2022-02-14 RX ADMIN — Medication 75 MILLIGRAM(S): at 16:00

## 2022-02-14 RX ADMIN — Medication 325 MILLIGRAM(S): at 11:39

## 2022-02-14 RX ADMIN — Medication 5 MILLIGRAM(S): at 22:34

## 2022-02-14 NOTE — PROGRESS NOTE ADULT - SUBJECTIVE AND OBJECTIVE BOX
INTERVAL HPI/OVERNIGHT EVENTS:    SUBJECTIVE: Patient seen and examined at bedside.     OBJECTIVE:    VITAL SIGNS:  ICU Vital Signs Last 24 Hrs  T(C): 36.6 (14 Feb 2022 05:53), Max: 36.7 (13 Feb 2022 20:47)  T(F): 97.9 (14 Feb 2022 05:53), Max: 98 (13 Feb 2022 20:47)  HR: 67 (14 Feb 2022 05:53) (67 - 75)  BP: 128/65 (14 Feb 2022 05:53) (128/65 - 141/65)  BP(mean): --  ABP: --  ABP(mean): --  RR: 18 (14 Feb 2022 05:53) (17 - 18)  SpO2: 97% (14 Feb 2022 05:53) (95% - 98%)        CAPILLARY BLOOD GLUCOSE      POCT Blood Glucose.: 184 mg/dL (14 Feb 2022 08:40)      PHYSICAL EXAM:    Constitutional: NAD, well-groomed, well-developed  HEENT: PERRLA, EOMI, Normal Hearing, MMM  Neck: No LAD, No JVD  Back: Normal spine flexure, No CVA tenderness  Respiratory: CTAB   Cardiovascular: S1 and S2, RRR, no M/G/R  Gastrointestinal: BS+, soft, NT/ND  Extremities: No peripheral edema  Vascular: 2+ peripheral pulses  Neurological: A/O x 3, no focal deficits  Psychiatric: Normal mood, normal affect  Musculoskeletal: 5/5 strength b/l upper and lower extremities  Skin: No rashes    MEDICATIONS:  MEDICATIONS  (STANDING):  amLODIPine   Tablet 10 milliGRAM(s) Oral daily  atorvastatin 10 milliGRAM(s) Oral at bedtime  dextrose 40% Gel 15 Gram(s) Oral once  dextrose 5%. 1000 milliLiter(s) (100 mL/Hr) IV Continuous <Continuous>  dextrose 5%. 1000 milliLiter(s) (50 mL/Hr) IV Continuous <Continuous>  dextrose 50% Injectable 25 Gram(s) IV Push once  dextrose 50% Injectable 12.5 Gram(s) IV Push once  dextrose 50% Injectable 25 Gram(s) IV Push once  DULoxetine 30 milliGRAM(s) Oral at bedtime  ferrous    sulfate 325 milliGRAM(s) Oral daily  fluticasone propionate 50 MICROgram(s)/spray Nasal Spray 1 Spray(s) Both Nostrils two times a day  glucagon  Injectable 1 milliGRAM(s) IntraMuscular once  glucagon  Injectable 1 milliGRAM(s) IntraMuscular once  heparin   Injectable 5000 Unit(s) SubCutaneous every 8 hours  hydrALAZINE 75 milliGRAM(s) Oral every 8 hours  insulin glargine Injectable (LANTUS) 13 Unit(s) SubCutaneous at bedtime  insulin lispro (ADMELOG) corrective regimen sliding scale   SubCutaneous Before meals and at bedtime  insulin lispro Injectable (ADMELOG) 5 Unit(s) SubCutaneous three times a day before meals  levothyroxine 50 MICROGram(s) Oral daily  melatonin 5 milliGRAM(s) Oral at bedtime  pantoprazole    Tablet 40 milliGRAM(s) Oral before breakfast  sodium bicarbonate 650 milliGRAM(s) Oral every 8 hours  tamsulosin 0.4 milliGRAM(s) Oral at bedtime  traZODone 100 milliGRAM(s) Oral at bedtime    MEDICATIONS  (PRN):  acetaminophen     Tablet .. 650 milliGRAM(s) Oral every 6 hours PRN Temp greater or equal to 38C (100.4F), Mild Pain (1 - 3)      ALLERGIES:  Allergies    No Known Allergies    Intolerances        LABS:                        8.8    4.46  )-----------( 134      ( 13 Feb 2022 06:21 )             26.7     02-13    135  |  103  |  22  ----------------------------<  121<H>  4.3   |  22  |  1.38<H>    Ca    9.2      13 Feb 2022 06:21  Phos  2.9     02-13  Mg     1.9     02-13            RADIOLOGY & ADDITIONAL TESTS: Reviewed. INTERVAL HPI/OVERNIGHT EVENTS: NAOE    SUBJECTIVE: Patient seen and examined at bedside. Patient with no acute complaints, improving leg pain. ROS negative.     OBJECTIVE:    VITAL SIGNS:  ICU Vital Signs Last 24 Hrs  T(C): 36.6 (14 Feb 2022 05:53), Max: 36.7 (13 Feb 2022 20:47)  T(F): 97.9 (14 Feb 2022 05:53), Max: 98 (13 Feb 2022 20:47)  HR: 67 (14 Feb 2022 05:53) (67 - 75)  BP: 128/65 (14 Feb 2022 05:53) (128/65 - 141/65)  BP(mean): --  ABP: --  ABP(mean): --  RR: 18 (14 Feb 2022 05:53) (17 - 18)  SpO2: 97% (14 Feb 2022 05:53) (95% - 98%)        CAPILLARY BLOOD GLUCOSE      POCT Blood Glucose.: 184 mg/dL (14 Feb 2022 08:40)      PHYSICAL EXAM:    General: NAD, sitting up in bed, conversational  HEENT: MMM  Cardiovascular: +S1/S2; RRR  Respiratory: CTA B/L; no W/R/R  Gastrointestinal: ileostomy/colostomy bag, no TTP   Extremities: WWP; no edema, clubbing or cyanosis  Vascular: 2+ radial, DP/PT pulses B/L  Neurological: AAOx2; no focal deficits  Musculoskeletal: 5/5 strength b/l upper and lower extremities  Skin: No rashes    MEDICATIONS:  MEDICATIONS  (STANDING):  amLODIPine   Tablet 10 milliGRAM(s) Oral daily  atorvastatin 10 milliGRAM(s) Oral at bedtime  dextrose 40% Gel 15 Gram(s) Oral once  dextrose 5%. 1000 milliLiter(s) (100 mL/Hr) IV Continuous <Continuous>  dextrose 5%. 1000 milliLiter(s) (50 mL/Hr) IV Continuous <Continuous>  dextrose 50% Injectable 25 Gram(s) IV Push once  dextrose 50% Injectable 12.5 Gram(s) IV Push once  dextrose 50% Injectable 25 Gram(s) IV Push once  DULoxetine 30 milliGRAM(s) Oral at bedtime  ferrous    sulfate 325 milliGRAM(s) Oral daily  fluticasone propionate 50 MICROgram(s)/spray Nasal Spray 1 Spray(s) Both Nostrils two times a day  glucagon  Injectable 1 milliGRAM(s) IntraMuscular once  glucagon  Injectable 1 milliGRAM(s) IntraMuscular once  heparin   Injectable 5000 Unit(s) SubCutaneous every 8 hours  hydrALAZINE 75 milliGRAM(s) Oral every 8 hours  insulin glargine Injectable (LANTUS) 13 Unit(s) SubCutaneous at bedtime  insulin lispro (ADMELOG) corrective regimen sliding scale   SubCutaneous Before meals and at bedtime  insulin lispro Injectable (ADMELOG) 5 Unit(s) SubCutaneous three times a day before meals  levothyroxine 50 MICROGram(s) Oral daily  melatonin 5 milliGRAM(s) Oral at bedtime  pantoprazole    Tablet 40 milliGRAM(s) Oral before breakfast  sodium bicarbonate 650 milliGRAM(s) Oral every 8 hours  tamsulosin 0.4 milliGRAM(s) Oral at bedtime  traZODone 100 milliGRAM(s) Oral at bedtime    MEDICATIONS  (PRN):  acetaminophen     Tablet .. 650 milliGRAM(s) Oral every 6 hours PRN Temp greater or equal to 38C (100.4F), Mild Pain (1 - 3)      ALLERGIES:  Allergies    No Known Allergies    Intolerances        LABS:                        8.8    4.46  )-----------( 134      ( 13 Feb 2022 06:21 )             26.7     02-13    135  |  103  |  22  ----------------------------<  121<H>  4.3   |  22  |  1.38<H>    Ca    9.2      13 Feb 2022 06:21  Phos  2.9     02-13  Mg     1.9     02-13            RADIOLOGY & ADDITIONAL TESTS: Reviewed.

## 2022-02-14 NOTE — PROGRESS NOTE ADULT - PROBLEM SELECTOR PLAN 2
RESOLVED:  Hypothermic w/WBC 22 on admission. Lactate 3 on admission now resolved. CT head, chest and abd/pelvis wnl. UA positive. Purulent urine in [place]. Severe sepsis likely 2/2 UTI. Normothermic and HDS now.  - UCX 1/6/22: klebsiella pneumonia   - BCxs 2/6 NGTD  - s/p ceftriaxone treatment x7 days

## 2022-02-14 NOTE — PROGRESS NOTE ADULT - PROBLEM SELECTOR PLAN 1
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 Patient with multiple recent admissions for acute renal failure with hyperkalemia, the last 2 times requiring emergent dialysis. Hyperkalemia resolved without dialysis this admission. Acidosis now resolved. CT abd/pelvis without obvious hydronephrosis or stone. Baseline Cr 1-1.2.    - c/w PO bicarb tabs 650mg q8  - avoid nephrotoxic medications and renally dose medications  - strict I/Os; goal keep net even (can give IV NS if needed)  - renal following, recs appreciated  - BUN and creatinine stable at new baseline, no need for daily labs

## 2022-02-15 LAB
GLUCOSE BLDC GLUCOMTR-MCNC: 138 MG/DL — HIGH (ref 70–99)
GLUCOSE BLDC GLUCOMTR-MCNC: 162 MG/DL — HIGH (ref 70–99)
GLUCOSE BLDC GLUCOMTR-MCNC: 195 MG/DL — HIGH (ref 70–99)
GLUCOSE BLDC GLUCOMTR-MCNC: 248 MG/DL — HIGH (ref 70–99)

## 2022-02-15 PROCEDURE — 99233 SBSQ HOSP IP/OBS HIGH 50: CPT | Mod: GC

## 2022-02-15 RX ADMIN — Medication 325 MILLIGRAM(S): at 11:55

## 2022-02-15 RX ADMIN — AMLODIPINE BESYLATE 10 MILLIGRAM(S): 2.5 TABLET ORAL at 07:30

## 2022-02-15 RX ADMIN — DULOXETINE HYDROCHLORIDE 30 MILLIGRAM(S): 30 CAPSULE, DELAYED RELEASE ORAL at 22:59

## 2022-02-15 RX ADMIN — ATORVASTATIN CALCIUM 10 MILLIGRAM(S): 80 TABLET, FILM COATED ORAL at 22:59

## 2022-02-15 RX ADMIN — Medication 650 MILLIGRAM(S): at 22:59

## 2022-02-15 RX ADMIN — Medication 75 MILLIGRAM(S): at 07:30

## 2022-02-15 RX ADMIN — Medication 75 MILLIGRAM(S): at 22:59

## 2022-02-15 RX ADMIN — Medication 50 MICROGRAM(S): at 07:31

## 2022-02-15 RX ADMIN — Medication 1 SPRAY(S): at 18:01

## 2022-02-15 RX ADMIN — Medication 5 UNIT(S): at 18:03

## 2022-02-15 RX ADMIN — Medication 5 UNIT(S): at 08:52

## 2022-02-15 RX ADMIN — HEPARIN SODIUM 5000 UNIT(S): 5000 INJECTION INTRAVENOUS; SUBCUTANEOUS at 07:31

## 2022-02-15 RX ADMIN — PANTOPRAZOLE SODIUM 40 MILLIGRAM(S): 20 TABLET, DELAYED RELEASE ORAL at 07:33

## 2022-02-15 RX ADMIN — Medication 650 MILLIGRAM(S): at 07:30

## 2022-02-15 RX ADMIN — HEPARIN SODIUM 5000 UNIT(S): 5000 INJECTION INTRAVENOUS; SUBCUTANEOUS at 14:29

## 2022-02-15 RX ADMIN — Medication 650 MILLIGRAM(S): at 23:59

## 2022-02-15 RX ADMIN — Medication 650 MILLIGRAM(S): at 14:38

## 2022-02-15 RX ADMIN — Medication 100 MILLIGRAM(S): at 22:59

## 2022-02-15 RX ADMIN — Medication 1 SPRAY(S): at 07:31

## 2022-02-15 RX ADMIN — Medication 2: at 13:11

## 2022-02-15 RX ADMIN — Medication 650 MILLIGRAM(S): at 14:28

## 2022-02-15 RX ADMIN — Medication 4: at 18:04

## 2022-02-15 RX ADMIN — HEPARIN SODIUM 5000 UNIT(S): 5000 INJECTION INTRAVENOUS; SUBCUTANEOUS at 22:59

## 2022-02-15 RX ADMIN — Medication 5 MILLIGRAM(S): at 22:58

## 2022-02-15 RX ADMIN — INSULIN GLARGINE 13 UNIT(S): 100 INJECTION, SOLUTION SUBCUTANEOUS at 22:28

## 2022-02-15 RX ADMIN — Medication 75 MILLIGRAM(S): at 14:29

## 2022-02-15 RX ADMIN — Medication 2: at 22:28

## 2022-02-15 RX ADMIN — Medication 5 UNIT(S): at 13:10

## 2022-02-15 RX ADMIN — TAMSULOSIN HYDROCHLORIDE 0.4 MILLIGRAM(S): 0.4 CAPSULE ORAL at 22:59

## 2022-02-15 NOTE — PROGRESS NOTE ADULT - SUBJECTIVE AND OBJECTIVE BOX
INTERVAL HPI/OVERNIGHT EVENTS:    SUBJECTIVE: Patient seen and examined at bedside.     OBJECTIVE:    VITAL SIGNS:  ICU Vital Signs Last 24 Hrs  T(C): 36.7 (15 Feb 2022 05:22), Max: 36.9 (14 Feb 2022 21:03)  T(F): 98 (15 Feb 2022 05:22), Max: 98.5 (14 Feb 2022 21:03)  HR: 61 (15 Feb 2022 05:22) (61 - 76)  BP: 118/65 (15 Feb 2022 05:22) (118/65 - 158/66)  BP(mean): --  ABP: --  ABP(mean): --  RR: 18 (15 Feb 2022 05:22) (18 - 18)  SpO2: 98% (15 Feb 2022 05:22) (95% - 98%)        02-14 @ 07:01  -  02-15 @ 07:00  --------------------------------------------------------  IN: 0 mL / OUT: 87 mL / NET: -87 mL      CAPILLARY BLOOD GLUCOSE      POCT Blood Glucose.: 178 mg/dL (14 Feb 2022 21:53)      PHYSICAL EXAM:    Constitutional: NAD, well-groomed, well-developed  HEENT: PERRLA, EOMI, Normal Hearing, MMM  Neck: No LAD, No JVD  Back: Normal spine flexure, No CVA tenderness  Respiratory: CTAB   Cardiovascular: S1 and S2, RRR, no M/G/R  Gastrointestinal: BS+, soft, NT/ND  Extremities: No peripheral edema  Vascular: 2+ peripheral pulses  Neurological: A/O x 3, no focal deficits  Psychiatric: Normal mood, normal affect  Musculoskeletal: 5/5 strength b/l upper and lower extremities  Skin: No rashes    MEDICATIONS:  MEDICATIONS  (STANDING):  amLODIPine   Tablet 10 milliGRAM(s) Oral daily  atorvastatin 10 milliGRAM(s) Oral at bedtime  dextrose 40% Gel 15 Gram(s) Oral once  dextrose 5%. 1000 milliLiter(s) (100 mL/Hr) IV Continuous <Continuous>  dextrose 5%. 1000 milliLiter(s) (50 mL/Hr) IV Continuous <Continuous>  dextrose 50% Injectable 25 Gram(s) IV Push once  dextrose 50% Injectable 12.5 Gram(s) IV Push once  dextrose 50% Injectable 25 Gram(s) IV Push once  DULoxetine 30 milliGRAM(s) Oral at bedtime  ferrous    sulfate 325 milliGRAM(s) Oral daily  fluticasone propionate 50 MICROgram(s)/spray Nasal Spray 1 Spray(s) Both Nostrils two times a day  glucagon  Injectable 1 milliGRAM(s) IntraMuscular once  glucagon  Injectable 1 milliGRAM(s) IntraMuscular once  heparin   Injectable 5000 Unit(s) SubCutaneous every 8 hours  hydrALAZINE 75 milliGRAM(s) Oral every 8 hours  insulin glargine Injectable (LANTUS) 13 Unit(s) SubCutaneous at bedtime  insulin lispro (ADMELOG) corrective regimen sliding scale   SubCutaneous Before meals and at bedtime  insulin lispro Injectable (ADMELOG) 5 Unit(s) SubCutaneous three times a day before meals  levothyroxine 50 MICROGram(s) Oral daily  melatonin 5 milliGRAM(s) Oral at bedtime  pantoprazole    Tablet 40 milliGRAM(s) Oral before breakfast  sodium bicarbonate 650 milliGRAM(s) Oral every 8 hours  tamsulosin 0.4 milliGRAM(s) Oral at bedtime  traZODone 100 milliGRAM(s) Oral at bedtime    MEDICATIONS  (PRN):  acetaminophen     Tablet .. 650 milliGRAM(s) Oral every 6 hours PRN Temp greater or equal to 38C (100.4F), Mild Pain (1 - 3)      ALLERGIES:  Allergies    No Known Allergies    Intolerances        LABS:                RADIOLOGY & ADDITIONAL TESTS: Reviewed. INTERVAL HPI/OVERNIGHT EVENTS: NAOE     SUBJECTIVE: Patient seen and examined at bedside. Patient with no concerns nor complaints, ROS negative.     OBJECTIVE:    VITAL SIGNS:  ICU Vital Signs Last 24 Hrs  T(C): 36.7 (15 Feb 2022 05:22), Max: 36.9 (14 Feb 2022 21:03)  T(F): 98 (15 Feb 2022 05:22), Max: 98.5 (14 Feb 2022 21:03)  HR: 61 (15 Feb 2022 05:22) (61 - 76)  BP: 118/65 (15 Feb 2022 05:22) (118/65 - 158/66)  BP(mean): --  ABP: --  ABP(mean): --  RR: 18 (15 Feb 2022 05:22) (18 - 18)  SpO2: 98% (15 Feb 2022 05:22) (95% - 98%)        02-14 @ 07:01  -  02-15 @ 07:00  --------------------------------------------------------  IN: 0 mL / OUT: 87 mL / NET: -87 mL      CAPILLARY BLOOD GLUCOSE      POCT Blood Glucose.: 178 mg/dL (14 Feb 2022 21:53)      PHYSICAL EXAM:    General: NAD, sitting up in bed, conversational  HEENT: MMM  Cardiovascular: +S1/S2; RRR  Respiratory: CTA B/L; no W/R/R  Gastrointestinal: ileostomy/colostomy bag, no TTP   Extremities: WWP; no edema, clubbing or cyanosis  Vascular: 2+ radial, DP/PT pulses B/L  Neurological: AAOx2; no focal deficits  Musculoskeletal: 5/5 strength b/l upper and lower extremities  Skin: No rashes    MEDICATIONS:  MEDICATIONS  (STANDING):  amLODIPine   Tablet 10 milliGRAM(s) Oral daily  atorvastatin 10 milliGRAM(s) Oral at bedtime  dextrose 40% Gel 15 Gram(s) Oral once  dextrose 5%. 1000 milliLiter(s) (100 mL/Hr) IV Continuous <Continuous>  dextrose 5%. 1000 milliLiter(s) (50 mL/Hr) IV Continuous <Continuous>  dextrose 50% Injectable 25 Gram(s) IV Push once  dextrose 50% Injectable 12.5 Gram(s) IV Push once  dextrose 50% Injectable 25 Gram(s) IV Push once  DULoxetine 30 milliGRAM(s) Oral at bedtime  ferrous    sulfate 325 milliGRAM(s) Oral daily  fluticasone propionate 50 MICROgram(s)/spray Nasal Spray 1 Spray(s) Both Nostrils two times a day  glucagon  Injectable 1 milliGRAM(s) IntraMuscular once  glucagon  Injectable 1 milliGRAM(s) IntraMuscular once  heparin   Injectable 5000 Unit(s) SubCutaneous every 8 hours  hydrALAZINE 75 milliGRAM(s) Oral every 8 hours  insulin glargine Injectable (LANTUS) 13 Unit(s) SubCutaneous at bedtime  insulin lispro (ADMELOG) corrective regimen sliding scale   SubCutaneous Before meals and at bedtime  insulin lispro Injectable (ADMELOG) 5 Unit(s) SubCutaneous three times a day before meals  levothyroxine 50 MICROGram(s) Oral daily  melatonin 5 milliGRAM(s) Oral at bedtime  pantoprazole    Tablet 40 milliGRAM(s) Oral before breakfast  sodium bicarbonate 650 milliGRAM(s) Oral every 8 hours  tamsulosin 0.4 milliGRAM(s) Oral at bedtime  traZODone 100 milliGRAM(s) Oral at bedtime    MEDICATIONS  (PRN):  acetaminophen     Tablet .. 650 milliGRAM(s) Oral every 6 hours PRN Temp greater or equal to 38C (100.4F), Mild Pain (1 - 3)      ALLERGIES:  Allergies    No Known Allergies    Intolerances        LABS:                RADIOLOGY & ADDITIONAL TESTS: Reviewed.

## 2022-02-16 ENCOUNTER — TRANSCRIPTION ENCOUNTER (OUTPATIENT)
Age: 62
End: 2022-02-16

## 2022-02-16 VITALS
HEART RATE: 79 BPM | SYSTOLIC BLOOD PRESSURE: 120 MMHG | DIASTOLIC BLOOD PRESSURE: 57 MMHG | RESPIRATION RATE: 18 BRPM | TEMPERATURE: 98 F | OXYGEN SATURATION: 100 %

## 2022-02-16 LAB
GLUCOSE BLDC GLUCOMTR-MCNC: 106 MG/DL — HIGH (ref 70–99)
GLUCOSE BLDC GLUCOMTR-MCNC: 144 MG/DL — HIGH (ref 70–99)

## 2022-02-16 PROCEDURE — 85730 THROMBOPLASTIN TIME PARTIAL: CPT

## 2022-02-16 PROCEDURE — 97116 GAIT TRAINING THERAPY: CPT

## 2022-02-16 PROCEDURE — 82140 ASSAY OF AMMONIA: CPT

## 2022-02-16 PROCEDURE — 97161 PT EVAL LOW COMPLEX 20 MIN: CPT

## 2022-02-16 PROCEDURE — 97530 THERAPEUTIC ACTIVITIES: CPT

## 2022-02-16 PROCEDURE — 71250 CT THORAX DX C-: CPT

## 2022-02-16 PROCEDURE — 80048 BASIC METABOLIC PNL TOTAL CA: CPT

## 2022-02-16 PROCEDURE — 83735 ASSAY OF MAGNESIUM: CPT

## 2022-02-16 PROCEDURE — 94640 AIRWAY INHALATION TREATMENT: CPT

## 2022-02-16 PROCEDURE — 84295 ASSAY OF SERUM SODIUM: CPT

## 2022-02-16 PROCEDURE — 83690 ASSAY OF LIPASE: CPT

## 2022-02-16 PROCEDURE — 70450 CT HEAD/BRAIN W/O DYE: CPT

## 2022-02-16 PROCEDURE — 86900 BLOOD TYPING SEROLOGIC ABO: CPT

## 2022-02-16 PROCEDURE — U0005: CPT

## 2022-02-16 PROCEDURE — 83550 IRON BINDING TEST: CPT

## 2022-02-16 PROCEDURE — 82010 KETONE BODYS QUAN: CPT

## 2022-02-16 PROCEDURE — 83935 ASSAY OF URINE OSMOLALITY: CPT

## 2022-02-16 PROCEDURE — 83540 ASSAY OF IRON: CPT

## 2022-02-16 PROCEDURE — 93005 ELECTROCARDIOGRAM TRACING: CPT

## 2022-02-16 PROCEDURE — 87186 SC STD MICRODIL/AGAR DIL: CPT

## 2022-02-16 PROCEDURE — 84100 ASSAY OF PHOSPHORUS: CPT

## 2022-02-16 PROCEDURE — 82533 TOTAL CORTISOL: CPT

## 2022-02-16 PROCEDURE — 82803 BLOOD GASES ANY COMBINATION: CPT

## 2022-02-16 PROCEDURE — 85025 COMPLETE CBC W/AUTO DIFF WBC: CPT

## 2022-02-16 PROCEDURE — 97535 SELF CARE MNGMENT TRAINING: CPT

## 2022-02-16 PROCEDURE — 87086 URINE CULTURE/COLONY COUNT: CPT

## 2022-02-16 PROCEDURE — U0003: CPT

## 2022-02-16 PROCEDURE — 86901 BLOOD TYPING SEROLOGIC RH(D): CPT

## 2022-02-16 PROCEDURE — 82728 ASSAY OF FERRITIN: CPT

## 2022-02-16 PROCEDURE — 99291 CRITICAL CARE FIRST HOUR: CPT

## 2022-02-16 PROCEDURE — 86850 RBC ANTIBODY SCREEN: CPT

## 2022-02-16 PROCEDURE — 87077 CULTURE AEROBIC IDENTIFY: CPT

## 2022-02-16 PROCEDURE — 74176 CT ABD & PELVIS W/O CONTRAST: CPT

## 2022-02-16 PROCEDURE — 80307 DRUG TEST PRSMV CHEM ANLYZR: CPT

## 2022-02-16 PROCEDURE — 96374 THER/PROPH/DIAG INJ IV PUSH: CPT

## 2022-02-16 PROCEDURE — 83605 ASSAY OF LACTIC ACID: CPT

## 2022-02-16 PROCEDURE — 84436 ASSAY OF TOTAL THYROXINE: CPT

## 2022-02-16 PROCEDURE — 82962 GLUCOSE BLOOD TEST: CPT

## 2022-02-16 PROCEDURE — 36415 COLL VENOUS BLD VENIPUNCTURE: CPT

## 2022-02-16 PROCEDURE — 87040 BLOOD CULTURE FOR BACTERIA: CPT

## 2022-02-16 PROCEDURE — 84443 ASSAY THYROID STIM HORMONE: CPT

## 2022-02-16 PROCEDURE — 99239 HOSP IP/OBS DSCHRG MGMT >30: CPT | Mod: GC

## 2022-02-16 PROCEDURE — 85610 PROTHROMBIN TIME: CPT

## 2022-02-16 PROCEDURE — 97110 THERAPEUTIC EXERCISES: CPT

## 2022-02-16 PROCEDURE — 84300 ASSAY OF URINE SODIUM: CPT

## 2022-02-16 PROCEDURE — 87635 SARS-COV-2 COVID-19 AMP PRB: CPT

## 2022-02-16 PROCEDURE — 81001 URINALYSIS AUTO W/SCOPE: CPT

## 2022-02-16 PROCEDURE — 84540 ASSAY OF URINE/UREA-N: CPT

## 2022-02-16 PROCEDURE — 80053 COMPREHEN METABOLIC PANEL: CPT

## 2022-02-16 PROCEDURE — 84484 ASSAY OF TROPONIN QUANT: CPT

## 2022-02-16 PROCEDURE — 82330 ASSAY OF CALCIUM: CPT

## 2022-02-16 PROCEDURE — 85027 COMPLETE CBC AUTOMATED: CPT

## 2022-02-16 PROCEDURE — 82570 ASSAY OF URINE CREATININE: CPT

## 2022-02-16 PROCEDURE — 96375 TX/PRO/DX INJ NEW DRUG ADDON: CPT

## 2022-02-16 PROCEDURE — 84132 ASSAY OF SERUM POTASSIUM: CPT

## 2022-02-16 RX ORDER — SITAGLIPTIN 50 MG/1
1 TABLET, FILM COATED ORAL
Qty: 7 | Refills: 0
Start: 2022-02-16 | End: 2022-02-22

## 2022-02-16 RX ORDER — LEVOTHYROXINE SODIUM 125 MCG
1 TABLET ORAL
Qty: 7 | Refills: 0
Start: 2022-02-16 | End: 2022-02-22

## 2022-02-16 RX ORDER — DULOXETINE HYDROCHLORIDE 30 MG/1
1 CAPSULE, DELAYED RELEASE ORAL
Qty: 7 | Refills: 0
Start: 2022-02-16 | End: 2022-02-22

## 2022-02-16 RX ORDER — HYDRALAZINE HCL 50 MG
3 TABLET ORAL
Qty: 63 | Refills: 3
Start: 2022-02-16 | End: 2022-03-15

## 2022-02-16 RX ORDER — SODIUM BICARBONATE 1 MEQ/ML
1 SYRINGE (ML) INTRAVENOUS
Qty: 21 | Refills: 0
Start: 2022-02-16 | End: 2022-02-22

## 2022-02-16 RX ORDER — ATORVASTATIN CALCIUM 80 MG/1
1 TABLET, FILM COATED ORAL
Qty: 7 | Refills: 0
Start: 2022-02-16 | End: 2022-02-22

## 2022-02-16 RX ORDER — FERROUS SULFATE 325(65) MG
1 TABLET ORAL
Qty: 7 | Refills: 0
Start: 2022-02-16 | End: 2022-02-22

## 2022-02-16 RX ORDER — TAMSULOSIN HYDROCHLORIDE 0.4 MG/1
1 CAPSULE ORAL
Qty: 7 | Refills: 0
Start: 2022-02-16 | End: 2022-02-22

## 2022-02-16 RX ORDER — PANTOPRAZOLE SODIUM 20 MG/1
1 TABLET, DELAYED RELEASE ORAL
Qty: 7 | Refills: 0
Start: 2022-02-16 | End: 2022-02-22

## 2022-02-16 RX ORDER — AMLODIPINE BESYLATE 2.5 MG/1
1 TABLET ORAL
Qty: 7 | Refills: 0
Start: 2022-02-16 | End: 2022-02-22

## 2022-02-16 RX ORDER — TRAZODONE HCL 50 MG
1 TABLET ORAL
Qty: 7 | Refills: 0
Start: 2022-02-16 | End: 2022-02-22

## 2022-02-16 RX ADMIN — Medication 5 UNIT(S): at 12:57

## 2022-02-16 RX ADMIN — Medication 650 MILLIGRAM(S): at 06:37

## 2022-02-16 RX ADMIN — Medication 75 MILLIGRAM(S): at 06:36

## 2022-02-16 RX ADMIN — Medication 50 MICROGRAM(S): at 06:36

## 2022-02-16 RX ADMIN — Medication 325 MILLIGRAM(S): at 12:42

## 2022-02-16 RX ADMIN — Medication 650 MILLIGRAM(S): at 12:42

## 2022-02-16 RX ADMIN — PANTOPRAZOLE SODIUM 40 MILLIGRAM(S): 20 TABLET, DELAYED RELEASE ORAL at 06:36

## 2022-02-16 RX ADMIN — Medication 650 MILLIGRAM(S): at 07:37

## 2022-02-16 RX ADMIN — Medication 5 UNIT(S): at 08:59

## 2022-02-16 RX ADMIN — AMLODIPINE BESYLATE 10 MILLIGRAM(S): 2.5 TABLET ORAL at 06:36

## 2022-02-16 RX ADMIN — HEPARIN SODIUM 5000 UNIT(S): 5000 INJECTION INTRAVENOUS; SUBCUTANEOUS at 06:35

## 2022-02-16 RX ADMIN — Medication 650 MILLIGRAM(S): at 06:35

## 2022-02-16 NOTE — PROGRESS NOTE ADULT - PROBLEM SELECTOR PROBLEM 10
DM2 (diabetes mellitus, type 2)

## 2022-02-16 NOTE — PROGRESS NOTE ADULT - REASON FOR ADMISSION
Altered Mental Status, Hyperkalemia

## 2022-02-16 NOTE — PROGRESS NOTE ADULT - PROBLEM SELECTOR PROBLEM 9
BPH (benign prostatic hyperplasia)
Sepsis secondary to UTI
BPH (benign prostatic hyperplasia)
Sepsis secondary to UTI
BPH (benign prostatic hyperplasia)
Sepsis secondary to UTI
BPH (benign prostatic hyperplasia)
Sepsis secondary to UTI
BPH (benign prostatic hyperplasia)

## 2022-02-16 NOTE — DISCHARGE NOTE NURSING/CASE MANAGEMENT/SOCIAL WORK - NSDCPEFALRISK_GEN_ALL_CORE
For information on Fall & Injury Prevention, visit: [email or web address] OR  [email or web address] OR  https://www.cdc.gov/steadi/patient.html

## 2022-02-16 NOTE — PROGRESS NOTE ADULT - PROBLEM SELECTOR PROBLEM 8
Hyponatremia
BPH (benign prostatic hyperplasia)
Hyponatremia
Hyponatremia
BPH (benign prostatic hyperplasia)
Hyponatremia
Hyponatremia
BPH (benign prostatic hyperplasia)
BPH (benign prostatic hyperplasia)
Hyponatremia
Hyponatremia

## 2022-02-16 NOTE — PROGRESS NOTE ADULT - SUBJECTIVE AND OBJECTIVE BOX
INTERVAL HPI/OVERNIGHT EVENTS:    SUBJECTIVE: Patient seen and examined at bedside.     OBJECTIVE:    VITAL SIGNS:  ICU Vital Signs Last 24 Hrs  T(C): 36.6 (16 Feb 2022 09:35), Max: 37.2 (15 Feb 2022 17:37)  T(F): 97.8 (16 Feb 2022 09:35), Max: 98.9 (15 Feb 2022 17:37)  HR: 79 (16 Feb 2022 09:35) (60 - 82)  BP: 120/57 (16 Feb 2022 09:35) (120/57 - 156/65)  BP(mean): --  ABP: --  ABP(mean): --  RR: 18 (16 Feb 2022 09:35) (18 - 18)  SpO2: 100% (16 Feb 2022 09:35) (98% - 100%)        CAPILLARY BLOOD GLUCOSE      POCT Blood Glucose.: 106 mg/dL (16 Feb 2022 08:29)      PHYSICAL EXAM:    Constitutional: NAD, well-groomed, well-developed  HEENT: PERRLA, EOMI, Normal Hearing, MMM  Neck: No LAD, No JVD  Back: Normal spine flexure, No CVA tenderness  Respiratory: CTAB   Cardiovascular: S1 and S2, RRR, no M/G/R  Gastrointestinal: BS+, soft, NT/ND  Extremities: No peripheral edema  Vascular: 2+ peripheral pulses  Neurological: A/O x 3, no focal deficits  Psychiatric: Normal mood, normal affect  Musculoskeletal: 5/5 strength b/l upper and lower extremities  Skin: No rashes    MEDICATIONS:  MEDICATIONS  (STANDING):  amLODIPine   Tablet 10 milliGRAM(s) Oral daily  atorvastatin 10 milliGRAM(s) Oral at bedtime  dextrose 40% Gel 15 Gram(s) Oral once  dextrose 5%. 1000 milliLiter(s) (100 mL/Hr) IV Continuous <Continuous>  dextrose 5%. 1000 milliLiter(s) (50 mL/Hr) IV Continuous <Continuous>  dextrose 50% Injectable 25 Gram(s) IV Push once  dextrose 50% Injectable 12.5 Gram(s) IV Push once  dextrose 50% Injectable 25 Gram(s) IV Push once  DULoxetine 30 milliGRAM(s) Oral at bedtime  ferrous    sulfate 325 milliGRAM(s) Oral daily  fluticasone propionate 50 MICROgram(s)/spray Nasal Spray 1 Spray(s) Both Nostrils two times a day  glucagon  Injectable 1 milliGRAM(s) IntraMuscular once  glucagon  Injectable 1 milliGRAM(s) IntraMuscular once  heparin   Injectable 5000 Unit(s) SubCutaneous every 8 hours  hydrALAZINE 75 milliGRAM(s) Oral every 8 hours  insulin glargine Injectable (LANTUS) 13 Unit(s) SubCutaneous at bedtime  insulin lispro (ADMELOG) corrective regimen sliding scale   SubCutaneous Before meals and at bedtime  insulin lispro Injectable (ADMELOG) 5 Unit(s) SubCutaneous three times a day before meals  levothyroxine 50 MICROGram(s) Oral daily  melatonin 5 milliGRAM(s) Oral at bedtime  pantoprazole    Tablet 40 milliGRAM(s) Oral before breakfast  sodium bicarbonate 650 milliGRAM(s) Oral every 8 hours  tamsulosin 0.4 milliGRAM(s) Oral at bedtime  traZODone 100 milliGRAM(s) Oral at bedtime    MEDICATIONS  (PRN):  acetaminophen     Tablet .. 650 milliGRAM(s) Oral every 6 hours PRN Temp greater or equal to 38C (100.4F), Mild Pain (1 - 3)      ALLERGIES:  Allergies    No Known Allergies    Intolerances        LABS:                RADIOLOGY & ADDITIONAL TESTS: Reviewed.

## 2022-02-16 NOTE — PROGRESS NOTE ADULT - PROBLEM SELECTOR PLAN 12
F: none  E: caution with repletion however replete as absolutely necessary   N: CC: Soft and bite sized diet  DVT ppx: heparin subq  GI ppx: home PPI  Code Status: full code  Dispo: [facility]
F: none  E: caution with repletion however replete as absolutely necessary   N: CC: Soft and bite sized diet  DVT ppx: heparin subq  GI ppx: home PPI  Code Status: full code  Dispo: [facility]
F: none  E: caution with repletion however replete as absolutely necessary   N: CC: Soft and bite sized diet  DVT ppx: heparin subq  GI ppx: home PPI  Code Status: full code  Dispo: [facility]
F: none  E: caution with repletion however replete as absolutely necessary   N: CC: Soft and bite sized diet  DVT ppx: heparin subq  GI ppx: home PPI  Code Status: full code  Dispo: [facility]
F: none  E: caution with repletion however replete as absolutely necessary   N: CC: Soft and bite sized diet  DVT ppx: heparin subq  GI ppx: home PPI  Code Status: full code  Dispo: [facility]
F: none  E: caution with repletion however replete as absolutely necessary   N: CC: Soft and bite sized diet  DVT ppx: heparin subq  GI ppx: home PPI  Code Status: full code  Dispo: [facility]
F: none  E: caution with repletion however replete as absolutely necessary   N: CC: Soft and bite sized diet  DVT ppx: heparin subq  GI ppx: home PPI  Code Status: full code  Dispo: [facility]
F: none  E: caution with repletion however replete as absolutely necessary   N: CC: Soft and bite sized diet  DVT ppx: heparin subq  GI ppx: home PPI  Code Status: full code  Dispo: [facility]
F: none  E: caution with repletion however replete as absolutely necessary   N: CC: Soft and bite sized diet  DVT ppx: heparin subq  GI ppx: home PPI  Code Status: full code  Dispo: [facility]
F: none  E: caution with repletion however replete as absolutely necessary   N: CC: Soft and bite sized diet  DVT ppx: heparin subq  GI ppx: home PPI  Code Status: full code  Dispo: [facility]
F: none  E: caution with repletion however replete as absolutely necessary   N: CC: Soft and bite sized diet  DVT ppx: heparin subq  GI ppx: home PPI  Code Status: full code  Dispo: [facility]

## 2022-02-16 NOTE — PROGRESS NOTE ADULT - PROBLEM SELECTOR PROBLEM 5
HLD (hyperlipidemia)
Altered bowel elimination due to intestinal ostomy
HLD (hyperlipidemia)
HLD (hyperlipidemia)
Altered bowel elimination due to intestinal ostomy
HLD (hyperlipidemia)
Altered bowel elimination due to intestinal ostomy
HLD (hyperlipidemia)
Altered bowel elimination due to intestinal ostomy

## 2022-02-16 NOTE — PROGRESS NOTE ADULT - PROBLEM SELECTOR PROBLEM 6
Liver cirrhosis
ARF (acute renal failure)
Liver cirrhosis
Liver cirrhosis
ARF (acute renal failure)
Liver cirrhosis
Liver cirrhosis
ARF (acute renal failure)
Liver cirrhosis
Liver cirrhosis
ARF (acute renal failure)

## 2022-02-16 NOTE — DISCHARGE NOTE NURSING/CASE MANAGEMENT/SOCIAL WORK - NSDCFUADDAPPT_GEN_ALL_CORE_FT
Please make sure to follow up with your Primary Care Physician Dr Ovidio Pham as soon as possible.     Please make sure to call and schedule an initial appointment with the Nephrologist Dr Vicki Obrien as soon as possible.     Please make sure to follow with [facility] to fill all your prescriptions.   [address] [place] [address]  call: [phone]

## 2022-02-16 NOTE — PROGRESS NOTE ADULT - PROBLEM SELECTOR PROBLEM 1
ARF (acute renal failure)
Metabolic encephalopathy
ARF (acute renal failure)
Metabolic encephalopathy

## 2022-02-16 NOTE — PROGRESS NOTE ADULT - PROVIDER SPECIALTY LIST ADULT
Internal Medicine
Internal Medicine
Nephrology
Internal Medicine
Internal Medicine
MICU
MICU
Internal Medicine
Nephrology
Internal Medicine

## 2022-02-16 NOTE — PROGRESS NOTE ADULT - PROBLEM SELECTOR PLAN 11
-no signs of bleeding on exam  -B12 & folate wnl 1/22  -iron studies c/w AOCD  -c/w ferrous sulfate 325 daily for diarrhea   -continue to trend; transfuse for hgb <7  -maintain active T&S
-no signs of bleeding on exam  -B12 & folate wnl 1/22  -iron studies c/w AOCD  -c/w ferrous sulfate 325 daily for diarrhea   -continue to trend; transfuse for hgb <7  -maintain active T&S
-no signs of bleeding on exam  -B12 & folate wnl 1/22  -iron studies c/w AOCD  -c/w ferrous sulfate 325 daily   -continue to trend; transfuse for hgb <7  -maintain active T&S
-no signs of bleeding on exam  -B12 & folate wnl 1/22  -iron studies c/w AOCD  -c/w ferrous sulfate 325 daily for diarrhea   -continue to trend; transfuse for hgb <7  -maintain active T&S
-no signs of bleeding on exam  -B12 & folate wnl 1/22  -iron studies c/w AOCD  -c/w ferrous sulfate 325 daily   -continue to trend; transfuse for hgb <7  -maintain active T&S
-no signs of bleeding on exam  -B12 & folate wnl 1/22  -iron studies c/w AOCD  -c/w ferrous sulfate 325 daily   -continue to trend; transfuse for hgb <7  -maintain active T&S
-no signs of bleeding on exam  -B12 & folate wnl 1/22  -iron studies c/w AOCD  -c/w ferrous sulfate 325 daily for diarrhea   -continue to trend; transfuse for hgb <7  -maintain active T&S
-no signs of bleeding on exam  -B12 & folate wnl 1/22  -iron studies c/w AOCD  -c/w ferrous sulfate 325 daily   -continue to trend; transfuse for hgb <7  -maintain active T&S

## 2022-02-16 NOTE — PROGRESS NOTE ADULT - PROBLEM SELECTOR PROBLEM 3
Metabolic encephalopathy
HLD (hyperlipidemia)
Metabolic encephalopathy
HLD (hyperlipidemia)
Metabolic encephalopathy
HLD (hyperlipidemia)
HLD (hyperlipidemia)
Metabolic encephalopathy

## 2022-02-16 NOTE — PROGRESS NOTE ADULT - PROBLEM SELECTOR PROBLEM 2
Sepsis secondary to UTI
HTN (hypertension)
Sepsis secondary to UTI
HTN (hypertension)
HTN (hypertension)
Sepsis secondary to UTI
HTN (hypertension)

## 2022-02-16 NOTE — DISCHARGE NOTE NURSING/CASE MANAGEMENT/SOCIAL WORK - PATIENT PORTAL LINK FT
You can access the FollowMyHealth Patient Portal offered by [facility] by registering at the following website: [email or web address]. By joining [employer]’s FollowMyHealth portal, you will also be able to view your health information using other applications (apps) compatible with our system.

## 2022-02-16 NOTE — PROGRESS NOTE ADULT - PROBLEM SELECTOR PLAN 3
-c/w home atorvastatin 10mg qhs
A&O x 3. RESOLVED.     Presented with AMS x 1 day. CT head wnl. Utox negative. Alcohol and ammonia levels wnl. Likely 2/2 sepsis from UTI.
A&O x 3. RESOLVED.     Presented with AMS x 1 day. CT head wnl. Utox negative. Alcohol and ammonia levels wnl. Likely 2/2 sepsis from UTI.    - c/w sepsis management as below  - c/w renal failure tx as below  - hold home Trazodone 100mg daily - can restart if needed
-c/w home atorvastatin 10mg qhs
A&O x 3. RESOLVED.     Presented with AMS x 1 day. CT head wnl. Utox negative. Alcohol and ammonia levels wnl. Likely 2/2 sepsis from UTI.
A&O x 3. RESOLVED.     Presented with AMS x 1 day. CT head wnl. Utox negative. Alcohol and ammonia levels wnl. Likely 2/2 sepsis from UTI.    - c/w sepsis management as below  - c/w renal failure tx as below  - hold home Trazodone 100mg daily - can restart if needed

## 2022-02-16 NOTE — PROGRESS NOTE ADULT - PROBLEM SELECTOR PROBLEM 7
Altered bowel elimination due to intestinal ostomy
Altered bowel elimination due to intestinal ostomy
Hyponatremia
Altered bowel elimination due to intestinal ostomy
Altered bowel elimination due to intestinal ostomy
Hyponatremia
Altered bowel elimination due to intestinal ostomy
Hyponatremia
Altered bowel elimination due to intestinal ostomy
Altered bowel elimination due to intestinal ostomy
Hyponatremia

## 2022-02-16 NOTE — PROGRESS NOTE ADULT - PROBLEM SELECTOR PLAN 8
Na 121 on admission, increased to 127 after 2.5L NS bolus in the ED. Na improved to 134 this AM. S/p D5 and bicarb drips. S/p 1 dose DDAVP 2/7 for overcorrection.    - RESOLVED  -renal following, appreciate recs
-c/w home tamsulosin 0.4mg qhs
Na 121 on admission, increased to 127 after 2.5L NS bolus in the ED. Na improved to 134 this AM. S/p D5 and bicarb drips. S/p 1 dose DDAVP 2/7 for overcorrection.    - RESOLVED  -renal following, appreciate recs
Na 121 on admission, increased to 127 after 2.5L NS bolus in the ED. Na improved to 134 this AM. S/p D5 and bicarb drips. S/p 1 dose DDAVP 2/7 for overcorrection.    - RESOLVED  -renal following, appreciate recs
-c/w home tamsulosin 0.4mg qhs
-c/w home tamsulosin 0.4mg qhs
Na 121 on admission, increased to 127 after 2.5L NS bolus in the ED. Na improved to 134 this AM. S/p D5 and bicarb drips. S/p 1 dose DDAVP 2/7 for overcorrection.    - RESOLVED  -renal following, appreciate recs
Na 121 on admission, increased to 127 after 2.5L NS bolus in the ED. Na improved to 134 this AM. S/p D5 and bicarb drips. S/p 1 dose DDAVP 2/7 for overcorrection.    - RESOLVED  -renal following, appreciate recs
-c/w home tamsulosin 0.4mg qhs
Na 121 on admission, increased to 127 after 2.5L NS bolus in the ED. Na improved to 134 this AM. S/p D5 and bicarb drips. S/p 1 dose DDAVP 2/7 for overcorrection.    - RESOLVED  -renal following, appreciate recs
Na 121 on admission, increased to 127 after 2.5L NS bolus in the ED. Na improved to 134 this AM. S/p D5 and bicarb drips. S/p 1 dose DDAVP 2/7 for overcorrection.    - RESOLVED  -renal following, appreciate recs

## 2022-02-16 NOTE — PROGRESS NOTE ADULT - PROBLEM SELECTOR PROBLEM 4
HTN (hypertension)
Liver cirrhosis
HTN (hypertension)
Liver cirrhosis
HTN (hypertension)
HTN (hypertension)
Liver cirrhosis
HTN (hypertension)
Liver cirrhosis

## 2022-02-18 ENCOUNTER — APPOINTMENT (OUTPATIENT)
Dept: PULMONOLOGY | Facility: CLINIC | Age: 62
End: 2022-02-18

## 2022-02-18 NOTE — DISCHARGE NOTE PROVIDER - PROVIDER TOKENS
Consent: The patient's consent was obtained including but not limited to risks of crusting, scabbing, blistering, scarring, darker or lighter pigmentary change, recurrence, incomplete removal and infection. Show Aperture Variable?: Yes Render Post-Care Instructions In Note?: no Detail Level: Detailed Number Of Freeze-Thaw Cycles: 1 freeze-thaw cycle Duration Of Freeze Thaw-Cycle (Seconds): 10 Post-Care Instructions: I reviewed with the patient in detail post-care instructions. Patient is to wear sunprotection, and avoid picking at any of the treated lesions. Pt may apply Vaseline to crusted or scabbing areas. PROVIDER:[TOKEN:[[id number]]] PROVIDER:[TOKEN:[[id number]]],PROVIDER:[TOKEN:[[id number]]]

## 2022-02-22 NOTE — ED PROVIDER NOTE - CHPI ED SYMPTOMS NEG
Left message for patient regarding breast cancer screening- I provided contact numbers for patient to call and schedule.    Patient is needing orders in the system for mammogram.    no cough, no shortness of breath,/no nausea/no pain/no chills/no vomiting
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
## 2022-02-23 DIAGNOSIS — K59.31 TOXIC MEGACOLON: ICD-10-CM

## 2022-02-23 DIAGNOSIS — E87.4 MIXED DISORDER OF ACID-BASE BALANCE: ICD-10-CM

## 2022-02-23 DIAGNOSIS — T83.511A INFECTION AND INFLAMMATORY REACTION DUE TO INDWELLING URETHRAL CATHETER, INITIAL ENCOUNTER: ICD-10-CM

## 2022-02-23 DIAGNOSIS — N17.0 ACUTE KIDNEY FAILURE WITH TUBULAR NECROSIS: ICD-10-CM

## 2022-02-23 DIAGNOSIS — E87.5 HYPERKALEMIA: ICD-10-CM

## 2022-02-23 DIAGNOSIS — G24.01 DRUG INDUCED SUBACUTE DYSKINESIA: ICD-10-CM

## 2022-02-23 DIAGNOSIS — Z93.3 COLOSTOMY STATUS: ICD-10-CM

## 2022-02-23 DIAGNOSIS — N17.9 ACUTE KIDNEY FAILURE, UNSPECIFIED: ICD-10-CM

## 2022-02-23 DIAGNOSIS — R65.20 SEVERE SEPSIS WITHOUT SEPTIC SHOCK: ICD-10-CM

## 2022-02-23 DIAGNOSIS — K70.30 ALCOHOLIC CIRRHOSIS OF LIVER WITHOUT ASCITES: ICD-10-CM

## 2022-02-23 DIAGNOSIS — G93.41 METABOLIC ENCEPHALOPATHY: ICD-10-CM

## 2022-02-23 DIAGNOSIS — G47.00 INSOMNIA, UNSPECIFIED: ICD-10-CM

## 2022-02-23 DIAGNOSIS — Z90.49 ACQUIRED ABSENCE OF OTHER SPECIFIED PARTS OF DIGESTIVE TRACT: ICD-10-CM

## 2022-02-23 DIAGNOSIS — D63.8 ANEMIA IN OTHER CHRONIC DISEASES CLASSIFIED ELSEWHERE: ICD-10-CM

## 2022-02-23 DIAGNOSIS — N39.0 URINARY TRACT INFECTION, SITE NOT SPECIFIED: ICD-10-CM

## 2022-02-23 DIAGNOSIS — E03.9 HYPOTHYROIDISM, UNSPECIFIED: ICD-10-CM

## 2022-02-23 DIAGNOSIS — A41.9 SEPSIS, UNSPECIFIED ORGANISM: ICD-10-CM

## 2022-02-23 DIAGNOSIS — F10.10 ALCOHOL ABUSE, UNCOMPLICATED: ICD-10-CM

## 2022-02-23 DIAGNOSIS — E87.2 ACIDOSIS: ICD-10-CM

## 2022-02-23 DIAGNOSIS — R00.1 BRADYCARDIA, UNSPECIFIED: ICD-10-CM

## 2022-02-23 DIAGNOSIS — N40.0 BENIGN PROSTATIC HYPERPLASIA WITHOUT LOWER URINARY TRACT SYMPTOMS: ICD-10-CM

## 2022-02-23 DIAGNOSIS — E11.42 TYPE 2 DIABETES MELLITUS WITH DIABETIC POLYNEUROPATHY: ICD-10-CM

## 2022-02-23 DIAGNOSIS — E87.1 HYPO-OSMOLALITY AND HYPONATREMIA: ICD-10-CM

## 2022-02-23 DIAGNOSIS — Z79.84 LONG TERM (CURRENT) USE OF ORAL HYPOGLYCEMIC DRUGS: ICD-10-CM

## 2022-02-28 NOTE — PROVIDER CONTACT NOTE (CRITICAL VALUE NOTIFICATION) - ACTION/TREATMENT ORDERED:
[FreeTextEntry1] : Basic cardiovascular prevention measures are advised including regular exercise, surveillance medical examination, and prudent portion-controlled low fat diet, rich in a variety of vegetables with minimal added sugars, refined starches, and no artificially hydrogenated oils.\par Discussion and interpretation of previous tests , external notes( labs, radiology, specialist  , patient verbalized understanding.\par Prescription drug management\par renew xanax. \par pt leaving for [facility]. \par  she will seek psychiatry when she gets to [place]. \par 
Dr. Raphael made aware, continue iv fluid as per MD, will monitor
Tele monitioring  Renal consult  Receiving IVFluids
MD Raphael came to bedside and instructed RN to turn off gtt for now.

## 2022-03-10 LAB — GLUCOSE BLDC GLUCOMTR-MCNC: 296 MG/DL — HIGH (ref 70–99)

## 2022-05-02 NOTE — PROGRESS NOTE ADULT - PROVIDER SPECIALTY LIST ADULT
Internal Medicine Sski Pregnancy And Lactation Text: This medication is Pregnancy Category D and isn't considered safe during pregnancy. It is excreted in breast milk.

## 2022-05-13 NOTE — PATIENT PROFILE ADULT - NSPROSPHOSPCHAPLAINYN_GEN_A_NUR
Brief OP Note    Pre-op Diagnosis: Basilar artery aneurysm   Post-op Diagnosis: same as above    Procedure: Cerebral angiogram for stent/coiling of basilar artery aneurysm   Vessels Selected: RConnie gallego   Right radial artery angiogram     Proceduralist: Dr. Amita Nur  Assistants: Dr. Vanesa Sanders    Anesthesia Type: general   Estimated Blood Loss: minimal  Contrast:  see MAR  Radiation: see PACS    Closure: vascband to right radial artery     Implants:  [place] stent, optima coils    Findings:   1. Stent assisted coiling of basilar tip aneurysm with [place] stent and optima coils  2. Wiley I occlusion post embolization   3.  DynaCT negative for acute abnormality    Complications: none immediate  Dispo: [facility] [place]  [facility]
no

## 2022-05-30 PROCEDURE — 83735 ASSAY OF MAGNESIUM: CPT

## 2022-05-30 PROCEDURE — 83935 ASSAY OF URINE OSMOLALITY: CPT

## 2022-05-30 PROCEDURE — 84100 ASSAY OF PHOSPHORUS: CPT

## 2022-05-30 PROCEDURE — 90935 HEMODIALYSIS ONE EVALUATION: CPT

## 2022-05-30 PROCEDURE — 85730 THROMBOPLASTIN TIME PARTIAL: CPT

## 2022-05-30 PROCEDURE — 97535 SELF CARE MNGMENT TRAINING: CPT

## 2022-05-30 PROCEDURE — 70450 CT HEAD/BRAIN W/O DYE: CPT

## 2022-05-30 PROCEDURE — 71250 CT THORAX DX C-: CPT

## 2022-05-30 PROCEDURE — 85610 PROTHROMBIN TIME: CPT

## 2022-05-30 PROCEDURE — 80307 DRUG TEST PRSMV CHEM ANLYZR: CPT

## 2022-05-30 PROCEDURE — 96375 TX/PRO/DX INJ NEW DRUG ADDON: CPT

## 2022-05-30 PROCEDURE — 86901 BLOOD TYPING SEROLOGIC RH(D): CPT

## 2022-05-30 PROCEDURE — 76775 US EXAM ABDO BACK WALL LIM: CPT

## 2022-05-30 PROCEDURE — 86900 BLOOD TYPING SEROLOGIC ABO: CPT

## 2022-05-30 PROCEDURE — 93005 ELECTROCARDIOGRAM TRACING: CPT

## 2022-05-30 PROCEDURE — 80048 BASIC METABOLIC PNL TOTAL CA: CPT

## 2022-05-30 PROCEDURE — 85025 COMPLETE CBC W/AUTO DIFF WBC: CPT

## 2022-05-30 PROCEDURE — 94640 AIRWAY INHALATION TREATMENT: CPT

## 2022-05-30 PROCEDURE — 80053 COMPREHEN METABOLIC PANEL: CPT

## 2022-05-30 PROCEDURE — 82962 GLUCOSE BLOOD TEST: CPT

## 2022-05-30 PROCEDURE — 81001 URINALYSIS AUTO W/SCOPE: CPT

## 2022-05-30 PROCEDURE — 82803 BLOOD GASES ANY COMBINATION: CPT

## 2022-05-30 PROCEDURE — 71045 X-RAY EXAM CHEST 1 VIEW: CPT

## 2022-05-30 PROCEDURE — 83930 ASSAY OF BLOOD OSMOLALITY: CPT

## 2022-05-30 PROCEDURE — 84300 ASSAY OF URINE SODIUM: CPT

## 2022-05-30 PROCEDURE — 99291 CRITICAL CARE FIRST HOUR: CPT

## 2022-05-30 PROCEDURE — 82140 ASSAY OF AMMONIA: CPT

## 2022-05-30 PROCEDURE — 87635 SARS-COV-2 COVID-19 AMP PRB: CPT

## 2022-05-30 PROCEDURE — 86850 RBC ANTIBODY SCREEN: CPT

## 2022-05-30 PROCEDURE — 83605 ASSAY OF LACTIC ACID: CPT

## 2022-05-30 PROCEDURE — 36415 COLL VENOUS BLD VENIPUNCTURE: CPT

## 2022-05-30 PROCEDURE — 82570 ASSAY OF URINE CREATININE: CPT

## 2022-05-30 PROCEDURE — 97530 THERAPEUTIC ACTIVITIES: CPT

## 2022-05-30 PROCEDURE — 85027 COMPLETE CBC AUTOMATED: CPT

## 2022-05-30 PROCEDURE — 96376 TX/PRO/DX INJ SAME DRUG ADON: CPT

## 2022-05-30 PROCEDURE — 83690 ASSAY OF LIPASE: CPT

## 2022-05-30 PROCEDURE — 96374 THER/PROPH/DIAG INJ IV PUSH: CPT

## 2022-05-30 PROCEDURE — 97161 PT EVAL LOW COMPLEX 20 MIN: CPT

## 2022-06-18 ENCOUNTER — INPATIENT (INPATIENT)
Facility: HOSPITAL | Age: 62
LOS: 5 days | Discharge: HOME HEALTH | End: 2022-06-24
Attending: INTERNAL MEDICINE | Admitting: INTERNAL MEDICINE
Payer: MEDICAID

## 2022-06-18 VITALS
RESPIRATION RATE: 17 BRPM | DIASTOLIC BLOOD PRESSURE: 53 MMHG | HEIGHT: 67 IN | OXYGEN SATURATION: 100 % | SYSTOLIC BLOOD PRESSURE: 173 MMHG | WEIGHT: 169.98 LBS | HEART RATE: 62 BPM | TEMPERATURE: 98 F

## 2022-06-18 LAB
ALBUMIN SERPL ELPH-MCNC: 3.9 G/DL — SIGNIFICANT CHANGE UP (ref 3.3–5)
ALP SERPL-CCNC: 116 U/L — SIGNIFICANT CHANGE UP (ref 40–120)
ALT FLD-CCNC: 25 U/L — SIGNIFICANT CHANGE UP (ref 12–78)
AMMONIA BLD-MCNC: 16 UMOL/L — SIGNIFICANT CHANGE UP (ref 11–32)
AMPHET UR-MCNC: NEGATIVE — SIGNIFICANT CHANGE UP
ANION GAP SERPL CALC-SCNC: 8 MMOL/L — SIGNIFICANT CHANGE UP (ref 5–17)
APPEARANCE UR: ABNORMAL
APTT BLD: 31.5 SEC — SIGNIFICANT CHANGE UP (ref 27.5–35.5)
AST SERPL-CCNC: 21 U/L — SIGNIFICANT CHANGE UP (ref 15–37)
BACTERIA # UR AUTO: ABNORMAL
BARBITURATES UR SCN-MCNC: NEGATIVE — SIGNIFICANT CHANGE UP
BASOPHILS # BLD AUTO: 0.03 K/UL — SIGNIFICANT CHANGE UP (ref 0–0.2)
BASOPHILS NFR BLD AUTO: 0.3 % — SIGNIFICANT CHANGE UP (ref 0–2)
BENZODIAZ UR-MCNC: NEGATIVE — SIGNIFICANT CHANGE UP
BILIRUB SERPL-MCNC: 0.2 MG/DL — SIGNIFICANT CHANGE UP (ref 0.2–1.2)
BILIRUB UR-MCNC: NEGATIVE — SIGNIFICANT CHANGE UP
BUN SERPL-MCNC: 63 MG/DL — HIGH (ref 7–23)
CALCIUM SERPL-MCNC: 9.3 MG/DL — SIGNIFICANT CHANGE UP (ref 8.5–10.1)
CHLORIDE SERPL-SCNC: 102 MMOL/L — SIGNIFICANT CHANGE UP (ref 96–108)
CO2 SERPL-SCNC: 22 MMOL/L — SIGNIFICANT CHANGE UP (ref 22–31)
COCAINE METAB.OTHER UR-MCNC: NEGATIVE — SIGNIFICANT CHANGE UP
COLOR SPEC: YELLOW — SIGNIFICANT CHANGE UP
COMMENT - URINE: SIGNIFICANT CHANGE UP
CREAT SERPL-MCNC: 2.59 MG/DL — HIGH (ref 0.5–1.3)
DIFF PNL FLD: ABNORMAL
EGFR: 27 ML/MIN/1.73M2 — LOW
EOSINOPHIL # BLD AUTO: 0.13 K/UL — SIGNIFICANT CHANGE UP (ref 0–0.5)
EOSINOPHIL NFR BLD AUTO: 1.3 % — SIGNIFICANT CHANGE UP (ref 0–6)
EPI CELLS # UR: SIGNIFICANT CHANGE UP
FLUAV AG NPH QL: SIGNIFICANT CHANGE UP
FLUBV AG NPH QL: SIGNIFICANT CHANGE UP
GLUCOSE BLDC GLUCOMTR-MCNC: 225 MG/DL — HIGH (ref 70–99)
GLUCOSE SERPL-MCNC: 202 MG/DL — HIGH (ref 70–99)
GLUCOSE UR QL: 50 MG/DL
HCT VFR BLD CALC: 30.6 % — LOW (ref 39–50)
HGB BLD-MCNC: 10.6 G/DL — LOW (ref 13–17)
HIV 1 & 2 AB SERPL IA.RAPID: SIGNIFICANT CHANGE UP
IMM GRANULOCYTES NFR BLD AUTO: 0.3 % — SIGNIFICANT CHANGE UP (ref 0–1.5)
INR BLD: 1.09 RATIO — SIGNIFICANT CHANGE UP (ref 0.88–1.16)
KETONES UR-MCNC: NEGATIVE — SIGNIFICANT CHANGE UP
LACTATE SERPL-SCNC: 1.2 MMOL/L — SIGNIFICANT CHANGE UP (ref 0.7–2)
LEUKOCYTE ESTERASE UR-ACNC: ABNORMAL
LYMPHOCYTES # BLD AUTO: 1.22 K/UL — SIGNIFICANT CHANGE UP (ref 1–3.3)
LYMPHOCYTES # BLD AUTO: 11.8 % — LOW (ref 13–44)
MCHC RBC-ENTMCNC: 31.3 PG — SIGNIFICANT CHANGE UP (ref 27–34)
MCHC RBC-ENTMCNC: 34.6 G/DL — SIGNIFICANT CHANGE UP (ref 32–36)
MCV RBC AUTO: 90.3 FL — SIGNIFICANT CHANGE UP (ref 80–100)
METHADONE UR-MCNC: NEGATIVE — SIGNIFICANT CHANGE UP
MONOCYTES # BLD AUTO: 0.68 K/UL — SIGNIFICANT CHANGE UP (ref 0–0.9)
MONOCYTES NFR BLD AUTO: 6.6 % — SIGNIFICANT CHANGE UP (ref 2–14)
NEUTROPHILS # BLD AUTO: 8.25 K/UL — HIGH (ref 1.8–7.4)
NEUTROPHILS NFR BLD AUTO: 79.7 % — HIGH (ref 43–77)
NITRITE UR-MCNC: POSITIVE
NRBC # BLD: 0 /100 WBCS — SIGNIFICANT CHANGE UP (ref 0–0)
NT-PROBNP SERPL-SCNC: 730 PG/ML — HIGH (ref 0–125)
OPIATES UR-MCNC: NEGATIVE — SIGNIFICANT CHANGE UP
PCP SPEC-MCNC: SIGNIFICANT CHANGE UP
PCP UR-MCNC: NEGATIVE — SIGNIFICANT CHANGE UP
PH UR: 6 — SIGNIFICANT CHANGE UP (ref 5–8)
PLATELET # BLD AUTO: 195 K/UL — SIGNIFICANT CHANGE UP (ref 150–400)
POTASSIUM SERPL-MCNC: 6.6 MMOL/L — CRITICAL HIGH (ref 3.5–5.3)
POTASSIUM SERPL-SCNC: 6.6 MMOL/L — CRITICAL HIGH (ref 3.5–5.3)
PROT SERPL-MCNC: 9.9 GM/DL — HIGH (ref 6–8.3)
PROT UR-MCNC: 100 MG/DL
PROTHROM AB SERPL-ACNC: 13 SEC — SIGNIFICANT CHANGE UP (ref 10.5–13.4)
RBC # BLD: 3.39 M/UL — LOW (ref 4.2–5.8)
RBC # FLD: 14.5 % — SIGNIFICANT CHANGE UP (ref 10.3–14.5)
RBC CASTS # UR COMP ASSIST: ABNORMAL /HPF (ref 0–4)
SARS-COV-2 RNA SPEC QL NAA+PROBE: SIGNIFICANT CHANGE UP
SODIUM SERPL-SCNC: 132 MMOL/L — LOW (ref 135–145)
SP GR SPEC: 1.01 — SIGNIFICANT CHANGE UP (ref 1.01–1.02)
THC UR QL: NEGATIVE — SIGNIFICANT CHANGE UP
TSH SERPL-MCNC: 1.39 UIU/ML — SIGNIFICANT CHANGE UP (ref 0.36–3.74)
UROBILINOGEN FLD QL: NEGATIVE MG/DL — SIGNIFICANT CHANGE UP
WBC # BLD: 10.34 K/UL — SIGNIFICANT CHANGE UP (ref 3.8–10.5)
WBC # FLD AUTO: 10.34 K/UL — SIGNIFICANT CHANGE UP (ref 3.8–10.5)
WBC UR QL: ABNORMAL

## 2022-06-18 RX ORDER — SODIUM CHLORIDE 9 MG/ML
1000 INJECTION, SOLUTION INTRAVENOUS
Refills: 0 | Status: COMPLETED | OUTPATIENT
Start: 2022-06-18 | End: 2022-06-19

## 2022-06-18 RX ORDER — CEFTRIAXONE 500 MG/1
1000 INJECTION, POWDER, FOR SOLUTION INTRAMUSCULAR; INTRAVENOUS ONCE
Refills: 0 | Status: COMPLETED | OUTPATIENT
Start: 2022-06-18 | End: 2022-06-18

## 2022-06-18 RX ORDER — SODIUM CHLORIDE 9 MG/ML
2000 INJECTION, SOLUTION INTRAVENOUS ONCE
Refills: 0 | Status: COMPLETED | OUTPATIENT
Start: 2022-06-18 | End: 2022-06-18

## 2022-06-18 RX ORDER — SODIUM ZIRCONIUM CYCLOSILICATE 10 G/10G
10 POWDER, FOR SUSPENSION ORAL ONCE
Refills: 0 | Status: COMPLETED | OUTPATIENT
Start: 2022-06-18 | End: 2022-06-18

## 2022-06-18 RX ORDER — INSULIN HUMAN 100 [IU]/ML
5 INJECTION, SOLUTION SUBCUTANEOUS ONCE
Refills: 0 | Status: COMPLETED | OUTPATIENT
Start: 2022-06-18 | End: 2022-06-18

## 2022-06-18 RX ORDER — DEXTROSE 50 % IN WATER 50 %
50 SYRINGE (ML) INTRAVENOUS ONCE
Refills: 0 | Status: COMPLETED | OUTPATIENT
Start: 2022-06-18 | End: 2022-06-18

## 2022-06-18 RX ORDER — CALCIUM GLUCONATE 100 MG/ML
2 VIAL (ML) INTRAVENOUS ONCE
Refills: 0 | Status: COMPLETED | OUTPATIENT
Start: 2022-06-18 | End: 2022-06-18

## 2022-06-18 RX ORDER — KETOROLAC TROMETHAMINE 30 MG/ML
30 SYRINGE (ML) INJECTION ONCE
Refills: 0 | Status: DISCONTINUED | OUTPATIENT
Start: 2022-06-18 | End: 2022-06-18

## 2022-06-18 RX ADMIN — Medication 100 GRAM(S): at 21:47

## 2022-06-18 RX ADMIN — Medication 30 MILLIGRAM(S): at 21:15

## 2022-06-18 RX ADMIN — Medication 50 MILLILITER(S): at 21:44

## 2022-06-18 RX ADMIN — Medication 30 MILLIGRAM(S): at 21:30

## 2022-06-18 RX ADMIN — SODIUM CHLORIDE 2000 MILLILITER(S): 9 INJECTION, SOLUTION INTRAVENOUS at 20:57

## 2022-06-18 RX ADMIN — SODIUM ZIRCONIUM CYCLOSILICATE 10 GRAM(S): 10 POWDER, FOR SUSPENSION ORAL at 21:50

## 2022-06-18 RX ADMIN — INSULIN HUMAN 5 UNIT(S): 100 INJECTION, SOLUTION SUBCUTANEOUS at 21:46

## 2022-06-18 NOTE — ED PROVIDER NOTE - CLINICAL SUMMARY MEDICAL DECISION MAKING FREE TEXT BOX
pt pw ostomy bag rupture. pt is completely confused. seems too confused to be at a shelter. attempted to call [facility] with no response on the other end. cannot verify pt's mental status is at baseline. will thus need work up for ams and likely admission.   I read ekg as sinus shivani rate 58, no st elevatio nor depression, qtc 388, LVH criteria. pt pw ostomy bag rupture. pt is completely confused. seems too confused to be at a shelter. attempted to call [email or web address] with no response on the other end. cannot verify pt's mental status is at baseline. will thus need work up for ams and likely admission.   I read ekg as sinus shivani rate 58, no st elevation nor depression, qtc 388, LVH criteria.  labs show renal failure and hyper K. treated with insulin and dextrose and lokelma  ua shows infection. start abx.   admit.

## 2022-06-18 NOTE — ED PROVIDER NOTE - CARE PLAN
1 Principal Discharge DX:	Encounter for ostomy care education  Secondary Diagnosis:	Altered mental state

## 2022-06-18 NOTE — ED PROVIDER NOTE - OBJECTIVE STATEMENT
62m with ostomy pw ostomy bag break. pt confused and not providing other hx. reportedly from [facility], no staff from facility came.

## 2022-06-18 NOTE — ED ADULT NURSE NOTE - OBJECTIVE STATEMENT
Pt received in bed 32D, 61 y/o M, confused, drowsy, appears in NAD, presents to the ED for colostomy bag change. Pt unable to answer questions, inappropriate speech, poor historian. Denies drinking or doing any drugs. Leak noted in colostomy bag. Abdomen mildly distended, pt slightly diaphoretic. Pt placed on cardiac monitor, EKG done. Sepsis workup. Vitals stable at this time. Pt received in bed 32D, 61 y/o M, confused, drowsy, appears in NAD, presents to the ED for colostomy bag change. Pt unable to answer questions, inappropriate speech, poor historian. Denies drinking or doing any drugs. Leak noted in colostomy bag. Abdomen mildly distended, pt slightly diaphoretic. Pt placed on cardiac monitor, EKG done. Sepsis workup. Vitals stable at this time. 3 toes amputated on R foot noted. Skin intact.

## 2022-06-18 NOTE — ED ADULT NURSE NOTE - ED STAT RN HANDOFF DETAILS
Report given to receiving ED hold MARIA LUZ Wiley, pts history, current condition and reason for admission discussed, safety concerns addressed and reviewed, pt currently in stable condition, IV flushes for patency and site shows no signs or symptoms of infiltrate, dressing is clean dry and intact. Pt is confused a this time, appears in NAD, on cardiac monitor, HR 40-50s., IV ceftriaxone and IV fluids infusing at this time, colostomy bag changed and draining well. Pending BP meds and bed assignment on med surg unit.

## 2022-06-18 NOTE — CONSULT NOTE ADULT - SUBJECTIVE AND OBJECTIVE BOX
[facility] NEPHROLOGY SERVICES, [facility]  NEPHROLOGY AND HYPERTENSION  [address]  [address]  [place] [address]  [phone]    MD SHOBHA HALL, MD VERNON CASTELLANOS MD YELENA ROSENBERG, MD DUSTIN ALBRECHT, MD MARY VEGA MD      Information from chart:  "Patient is a 62y old  Male who presents with a chief complaint of ruptured ostomy bag  Noted elevated Cr and K;     Patient is a poor historian      PAST MEDICAL & SURGICAL HISTORY:    FAMILY HISTORY:    Allergies    No Known Allergies    Intolerances      Home Medications:    MEDICATIONS  (STANDING):    MEDICATIONS  (PRN):    Vital Signs Last 24 Hrs  T(C): 36.8 (18 Jun 2022 19:52), Max: 36.8 (18 Jun 2022 19:52)  T(F): 98.2 (18 Jun 2022 19:52), Max: 98.2 (18 Jun 2022 19:52)  HR: 62 (18 Jun 2022 19:52) (62 - 62)  BP: 173/53 (18 Jun 2022 19:52) (173/53 - 173/53)  BP(mean): --  RR: 17 (18 Jun 2022 19:52) (17 - 17)  SpO2: 100% (18 Jun 2022 19:52) (100% - 100%)    Daily Height in cm: 170.18 (18 Jun 2022 19:52)    Daily     CAPILLARY BLOOD GLUCOSE      POCT Blood Glucose.: 225 mg/dL (18 Jun 2022 20:15)    PHYSICAL EXAM:      T(C): 36.8 (06-18-22 @ 19:52), Max: 36.8 (06-18-22 @ 19:52)  HR: 62 (06-18-22 @ 19:52) (62 - 62)  BP: 173/53 (06-18-22 @ 19:52) (173/53 - 173/53)  RR: 17 (06-18-22 @ 19:52) (17 - 17)  SpO2: 100% (06-18-22 @ 19:52) (100% - 100%)  Wt(kg): --  Lungs clear  Heart S1S2  Abd soft ostomy RLQ Extremities:     tr edema              06-18    132<L>  |  102  |  63<H>  ----------------------------<  202<H>  6.6<HH>   |  22  |  2.59<H>    Ca    9.3      18 Jun 2022 21:03    TPro  9.9<H>  /  Alb  3.9  /  TBili  0.2  /  DBili  x   /  AST  21  /  ALT  25  /  AlkPhos  116  06-18                          10.6   10.34 )-----------( 195      ( 18 Jun 2022 21:03 )             30.6     Creatinine Trend: 2.59<--          Assessment   CHARLES, degree of CKD unclear ;   R/O Pre/post renal factors;   Noted anemia and elevated total protein    Plan  Medical rx K  Bladder, renal US  IVF NS;   Paraprotein screen  Will obtain hx from available family    Da Wilson MD [facility] NEPHROLOGY SERVICES, [facility]  NEPHROLOGY AND HYPERTENSION  [address]  [address]  [place] [address]  [phone]    MD SHOBHA HALL, MD VERNON CASTELLANOS MD YELENA ROSENBERG, MD DUSTIN ALBRECHT, MD MARY VEGA MD      Information from chart:  "Patient is a 62y old  Male who presents with a chief complaint of ruptured ostomy bag  Noted elevated Cr and K;     Patient is a poor historian      PAST MEDICAL & SURGICAL HISTORY:    FAMILY HISTORY:    Allergies    No Known Allergies    Intolerances      Home Medications:    MEDICATIONS  (STANDING):    MEDICATIONS  (PRN):    Vital Signs Last 24 Hrs  T(C): 36.8 (18 Jun 2022 19:52), Max: 36.8 (18 Jun 2022 19:52)  T(F): 98.2 (18 Jun 2022 19:52), Max: 98.2 (18 Jun 2022 19:52)  HR: 62 (18 Jun 2022 19:52) (62 - 62)  BP: 173/53 (18 Jun 2022 19:52) (173/53 - 173/53)  BP(mean): --  RR: 17 (18 Jun 2022 19:52) (17 - 17)  SpO2: 100% (18 Jun 2022 19:52) (100% - 100%)    Daily Height in cm: 170.18 (18 Jun 2022 19:52)    Daily     CAPILLARY BLOOD GLUCOSE      POCT Blood Glucose.: 225 mg/dL (18 Jun 2022 20:15)    PHYSICAL EXAM:      T(C): 36.8 (06-18-22 @ 19:52), Max: 36.8 (06-18-22 @ 19:52)  HR: 62 (06-18-22 @ 19:52) (62 - 62)  BP: 173/53 (06-18-22 @ 19:52) (173/53 - 173/53)  RR: 17 (06-18-22 @ 19:52) (17 - 17)  SpO2: 100% (06-18-22 @ 19:52) (100% - 100%)  Wt(kg): --  Lungs clear  Heart S1S2  Abd soft ostomy RLQ Extremities:     tr edema              06-18    132<L>  |  102  |  63<H>  ----------------------------<  202<H>  6.6<HH>   |  22  |  2.59<H>    Ca    9.3      18 Jun 2022 21:03    TPro  9.9<H>  /  Alb  3.9  /  TBili  0.2  /  DBili  x   /  AST  21  /  ALT  25  /  AlkPhos  116  06-18                          10.6   10.34 )-----------( 195      ( 18 Jun 2022 21:03 )             30.6     Creatinine Trend: 2.59<--          Assessment   CHARLES, degree of CKD unclear ;   R/O Pre/post renal factors;   Noted anemia and elevated total protein    Plan  Medical rx K  Non Con Ct abd pelvis   Avoid NSAIDs  IVF NS;   Paraprotein screen  Will obtain hx from available family    Da Wilson MD

## 2022-06-18 NOTE — ED ADULT NURSE NOTE - CAS TRG GEN SKIN COLOR
Dressing: bandage Was A Bandage Applied: Yes Post-Care Instructions: I reviewed with the patient in detail post-care instructions. Patient is to keep the biopsy site dry overnight, and then apply vaseline twice daily until healed. Patient was given post-care handout. Silver Nitrate Text: The wound bed was treated with silver nitrate after the biopsy was performed. Biopsy Method: double edge Personna blade Hemostasis: Drysol Detail Level: Detailed Normal for race Curettage Text: The wound bed was treated with curettage after the biopsy was performed. Notification Instructions: Patient will be notified of biopsy results. However, patient instructed to call the office if not contacted within 2 weeks. Lab: -[id number] Bill For Surgical Tray: no Anesthesia Type: 1% lidocaine with epinephrine Consent: Written consent was obtained and risks were reviewed including but not limited to scarring, infection, bleeding, scabbing, incomplete removal, nerve damage and allergy to anesthesia. Size Of Lesion In Cm: 0 Cryotherapy Text: The wound bed was treated with cryotherapy after the biopsy was performed. Lab Facility: [id number] Wound Care: Petrolatum Depth Of Biopsy: dermis Electrodesiccation Text: The wound bed was treated with electrodesiccation after the biopsy was performed. Biopsy Type: H and E Anesthesia Volume In Cc (Will Not Render If 0): 0.5 Type Of Destruction Used: Curettage Billing Type: Third-Party Bill Electrodesiccation And Curettage Text: The wound bed was treated with electrodesiccation and curettage after the biopsy was performed. Lab Facility: [id number] Billing Type: Third-Party Bill Lab Facility: [id number]

## 2022-06-18 NOTE — ED ADULT NURSE NOTE - NSIMPLEMENTINTERV_GEN_ALL_ED
Implemented All Fall Risk Interventions:  Conception to call system. Call bell, personal items and telephone within reach. Instruct patient to call for assistance. Room bathroom lighting operational. Non-slip footwear when patient is off stretcher. Physically safe environment: no spills, clutter or unnecessary equipment. Stretcher in lowest position, wheels locked, appropriate side rails in place. Provide visual cue, wrist band, yellow gown, etc. Monitor gait and stability. Monitor for mental status changes and reorient to person, place, and time. Review medications for side effects contributing to fall risk. Reinforce activity limits and safety measures with patient and family.

## 2022-06-18 NOTE — ED PROVIDER NOTE - PHYSICAL EXAMINATION
Gen: Alert, NAD  Head: NC, AT   Eyes: PERRL, EOMI, normal lids/conjunctiva  ENT: normal hearing, patent oropharynx without erythema/exudate, uvula midline  Neck: supple, no tenderness, Trachea midline  Pulm: Bilateral BS, normal resp effort, no wheeze/stridor/retractions  CV: RRR, no M/R/G, 2+ radial and dp pulses bl, no edema  Abd: soft, NT/ND, right abd ostomy bag is ruptured.   Mskel: extremities x4 with normal ROM and no joint effusions. no ctl spine ttp.   Skin: no rash, no bruising   Neuro: AAOx1 self only, no sensory/motor deficits, CN 2-12 intact

## 2022-06-19 DIAGNOSIS — A41.9 SEPSIS, UNSPECIFIED ORGANISM: ICD-10-CM

## 2022-06-19 DIAGNOSIS — D63.8 ANEMIA IN OTHER CHRONIC DISEASES CLASSIFIED ELSEWHERE: ICD-10-CM

## 2022-06-19 DIAGNOSIS — E87.1 HYPO-OSMOLALITY AND HYPONATREMIA: ICD-10-CM

## 2022-06-19 DIAGNOSIS — G93.41 METABOLIC ENCEPHALOPATHY: ICD-10-CM

## 2022-06-19 DIAGNOSIS — N17.9 ACUTE KIDNEY FAILURE, UNSPECIFIED: ICD-10-CM

## 2022-06-19 DIAGNOSIS — E87.5 HYPERKALEMIA: ICD-10-CM

## 2022-06-19 LAB
ALBUMIN SERPL ELPH-MCNC: 2.9 G/DL — LOW (ref 3.3–5)
ALBUMIN SERPL ELPH-MCNC: 3.3 G/DL — SIGNIFICANT CHANGE UP (ref 3.3–5)
ALP SERPL-CCNC: 103 U/L — SIGNIFICANT CHANGE UP (ref 40–120)
ALP SERPL-CCNC: 103 U/L — SIGNIFICANT CHANGE UP (ref 40–120)
ALT FLD-CCNC: 22 U/L — SIGNIFICANT CHANGE UP (ref 12–78)
ALT FLD-CCNC: 27 U/L — SIGNIFICANT CHANGE UP (ref 12–78)
ANION GAP SERPL CALC-SCNC: 8 MMOL/L — SIGNIFICANT CHANGE UP (ref 5–17)
ANION GAP SERPL CALC-SCNC: 8 MMOL/L — SIGNIFICANT CHANGE UP (ref 5–17)
APPEARANCE UR: ABNORMAL
AST SERPL-CCNC: 17 U/L — SIGNIFICANT CHANGE UP (ref 15–37)
AST SERPL-CCNC: 23 U/L — SIGNIFICANT CHANGE UP (ref 15–37)
BACTERIA # UR AUTO: ABNORMAL
BASOPHILS # BLD AUTO: 0.02 K/UL — SIGNIFICANT CHANGE UP (ref 0–0.2)
BASOPHILS NFR BLD AUTO: 0.3 % — SIGNIFICANT CHANGE UP (ref 0–2)
BILIRUB SERPL-MCNC: 0.2 MG/DL — SIGNIFICANT CHANGE UP (ref 0.2–1.2)
BILIRUB SERPL-MCNC: 0.2 MG/DL — SIGNIFICANT CHANGE UP (ref 0.2–1.2)
BILIRUB UR-MCNC: NEGATIVE — SIGNIFICANT CHANGE UP
BUN SERPL-MCNC: 59 MG/DL — HIGH (ref 7–23)
BUN SERPL-MCNC: 60 MG/DL — HIGH (ref 7–23)
CALCIUM SERPL-MCNC: 8.3 MG/DL — LOW (ref 8.5–10.1)
CALCIUM SERPL-MCNC: 9.9 MG/DL — SIGNIFICANT CHANGE UP (ref 8.5–10.1)
CHLORIDE SERPL-SCNC: 104 MMOL/L — SIGNIFICANT CHANGE UP (ref 96–108)
CHLORIDE SERPL-SCNC: 104 MMOL/L — SIGNIFICANT CHANGE UP (ref 96–108)
CO2 SERPL-SCNC: 17 MMOL/L — LOW (ref 22–31)
CO2 SERPL-SCNC: 23 MMOL/L — SIGNIFICANT CHANGE UP (ref 22–31)
COLOR SPEC: YELLOW — SIGNIFICANT CHANGE UP
CREAT ?TM UR-MCNC: 36 MG/DL — SIGNIFICANT CHANGE UP
CREAT SERPL-MCNC: 2.33 MG/DL — HIGH (ref 0.5–1.3)
CREAT SERPL-MCNC: 2.44 MG/DL — HIGH (ref 0.5–1.3)
DIFF PNL FLD: ABNORMAL
EGFR: 29 ML/MIN/1.73M2 — LOW
EGFR: 31 ML/MIN/1.73M2 — LOW
EOSINOPHIL # BLD AUTO: 0.15 K/UL — SIGNIFICANT CHANGE UP (ref 0–0.5)
EOSINOPHIL NFR BLD AUTO: 2.3 % — SIGNIFICANT CHANGE UP (ref 0–6)
EOSINOPHIL NFR URNS MANUAL: POSITIVE
GLUCOSE SERPL-MCNC: 170 MG/DL — HIGH (ref 70–99)
GLUCOSE SERPL-MCNC: 359 MG/DL — HIGH (ref 70–99)
GLUCOSE UR QL: 1000 MG/DL
HCT VFR BLD CALC: 25.2 % — LOW (ref 39–50)
HGB BLD-MCNC: 8.9 G/DL — LOW (ref 13–17)
IMM GRANULOCYTES NFR BLD AUTO: 0.2 % — SIGNIFICANT CHANGE UP (ref 0–1.5)
KETONES UR-MCNC: NEGATIVE — SIGNIFICANT CHANGE UP
LEUKOCYTE ESTERASE UR-ACNC: ABNORMAL
LYMPHOCYTES # BLD AUTO: 1.1 K/UL — SIGNIFICANT CHANGE UP (ref 1–3.3)
LYMPHOCYTES # BLD AUTO: 16.6 % — SIGNIFICANT CHANGE UP (ref 13–44)
MCHC RBC-ENTMCNC: 31.9 PG — SIGNIFICANT CHANGE UP (ref 27–34)
MCHC RBC-ENTMCNC: 35.3 G/DL — SIGNIFICANT CHANGE UP (ref 32–36)
MCV RBC AUTO: 90.3 FL — SIGNIFICANT CHANGE UP (ref 80–100)
MONOCYTES # BLD AUTO: 0.57 K/UL — SIGNIFICANT CHANGE UP (ref 0–0.9)
MONOCYTES NFR BLD AUTO: 8.6 % — SIGNIFICANT CHANGE UP (ref 2–14)
NEUTROPHILS # BLD AUTO: 4.76 K/UL — SIGNIFICANT CHANGE UP (ref 1.8–7.4)
NEUTROPHILS NFR BLD AUTO: 72 % — SIGNIFICANT CHANGE UP (ref 43–77)
NITRITE UR-MCNC: NEGATIVE — SIGNIFICANT CHANGE UP
NRBC # BLD: 0 /100 WBCS — SIGNIFICANT CHANGE UP (ref 0–0)
PH UR: 6 — SIGNIFICANT CHANGE UP (ref 5–8)
PLATELET # BLD AUTO: 161 K/UL — SIGNIFICANT CHANGE UP (ref 150–400)
POTASSIUM SERPL-MCNC: 5.1 MMOL/L — SIGNIFICANT CHANGE UP (ref 3.5–5.3)
POTASSIUM SERPL-MCNC: 5.7 MMOL/L — HIGH (ref 3.5–5.3)
POTASSIUM SERPL-SCNC: 5.1 MMOL/L — SIGNIFICANT CHANGE UP (ref 3.5–5.3)
POTASSIUM SERPL-SCNC: 5.7 MMOL/L — HIGH (ref 3.5–5.3)
PROT ?TM UR-MCNC: 141 MG/DL — HIGH (ref 0–12)
PROT SERPL-MCNC: 7.8 GM/DL — SIGNIFICANT CHANGE UP (ref 6–8.3)
PROT SERPL-MCNC: 8.2 GM/DL — SIGNIFICANT CHANGE UP (ref 6–8.3)
PROT UR-MCNC: 100 MG/DL
PROT/CREAT UR-RTO: 3.9 RATIO — HIGH (ref 0–0.2)
RBC # BLD: 2.79 M/UL — LOW (ref 4.2–5.8)
RBC # FLD: 14.3 % — SIGNIFICANT CHANGE UP (ref 10.3–14.5)
RBC CASTS # UR COMP ASSIST: ABNORMAL /HPF (ref 0–4)
SODIUM SERPL-SCNC: 129 MMOL/L — LOW (ref 135–145)
SODIUM SERPL-SCNC: 135 MMOL/L — SIGNIFICANT CHANGE UP (ref 135–145)
SP GR SPEC: 1.01 — SIGNIFICANT CHANGE UP (ref 1.01–1.02)
UROBILINOGEN FLD QL: NEGATIVE MG/DL — SIGNIFICANT CHANGE UP
VIT B12 SERPL-MCNC: 1179 PG/ML — SIGNIFICANT CHANGE UP (ref 232–1245)
WBC # BLD: 6.61 K/UL — SIGNIFICANT CHANGE UP (ref 3.8–10.5)
WBC # FLD AUTO: 6.61 K/UL — SIGNIFICANT CHANGE UP (ref 3.8–10.5)
WBC UR QL: SIGNIFICANT CHANGE UP /HPF (ref 0–5)

## 2022-06-19 RX ORDER — ACETAMINOPHEN 500 MG
650 TABLET ORAL EVERY 6 HOURS
Refills: 0 | Status: DISCONTINUED | OUTPATIENT
Start: 2022-06-19 | End: 2022-06-24

## 2022-06-19 RX ORDER — SODIUM CHLORIDE 9 MG/ML
500 INJECTION INTRAMUSCULAR; INTRAVENOUS; SUBCUTANEOUS ONCE
Refills: 0 | Status: COMPLETED | OUTPATIENT
Start: 2022-06-19 | End: 2022-06-19

## 2022-06-19 RX ORDER — NIFEDIPINE 30 MG
60 TABLET, EXTENDED RELEASE 24 HR ORAL DAILY
Refills: 0 | Status: DISCONTINUED | OUTPATIENT
Start: 2022-06-19 | End: 2022-06-22

## 2022-06-19 RX ORDER — SODIUM CHLORIDE 9 MG/ML
1000 INJECTION, SOLUTION INTRAVENOUS
Refills: 0 | Status: DISCONTINUED | OUTPATIENT
Start: 2022-06-19 | End: 2022-06-19

## 2022-06-19 RX ORDER — SODIUM ZIRCONIUM CYCLOSILICATE 10 G/10G
10 POWDER, FOR SUSPENSION ORAL EVERY 8 HOURS
Refills: 0 | Status: COMPLETED | OUTPATIENT
Start: 2022-06-19 | End: 2022-06-20

## 2022-06-19 RX ORDER — LANOLIN ALCOHOL/MO/W.PET/CERES
3 CREAM (GRAM) TOPICAL AT BEDTIME
Refills: 0 | Status: DISCONTINUED | OUTPATIENT
Start: 2022-06-19 | End: 2022-06-24

## 2022-06-19 RX ORDER — CEFTRIAXONE 500 MG/1
1000 INJECTION, POWDER, FOR SOLUTION INTRAMUSCULAR; INTRAVENOUS EVERY 24 HOURS
Refills: 0 | Status: COMPLETED | OUTPATIENT
Start: 2022-06-19 | End: 2022-06-21

## 2022-06-19 RX ORDER — ENOXAPARIN SODIUM 100 MG/ML
40 INJECTION SUBCUTANEOUS EVERY 24 HOURS
Refills: 0 | Status: DISCONTINUED | OUTPATIENT
Start: 2022-06-19 | End: 2022-06-24

## 2022-06-19 RX ORDER — SODIUM POLYSTYRENE SULFONATE 4.1 MEQ/G
15 POWDER, FOR SUSPENSION ORAL ONCE
Refills: 0 | Status: COMPLETED | OUTPATIENT
Start: 2022-06-19 | End: 2022-06-19

## 2022-06-19 RX ORDER — AMLODIPINE BESYLATE 2.5 MG/1
10 TABLET ORAL ONCE
Refills: 0 | Status: COMPLETED | OUTPATIENT
Start: 2022-06-19 | End: 2022-06-19

## 2022-06-19 RX ORDER — ONDANSETRON 8 MG/1
4 TABLET, FILM COATED ORAL EVERY 8 HOURS
Refills: 0 | Status: DISCONTINUED | OUTPATIENT
Start: 2022-06-19 | End: 2022-06-24

## 2022-06-19 RX ORDER — SODIUM CHLORIDE 9 MG/ML
1000 INJECTION, SOLUTION INTRAVENOUS
Refills: 0 | Status: COMPLETED | OUTPATIENT
Start: 2022-06-19 | End: 2022-06-19

## 2022-06-19 RX ADMIN — SODIUM CHLORIDE 100 MILLILITER(S): 9 INJECTION, SOLUTION INTRAVENOUS at 00:17

## 2022-06-19 RX ADMIN — SODIUM CHLORIDE 500 MILLILITER(S): 9 INJECTION INTRAMUSCULAR; INTRAVENOUS; SUBCUTANEOUS at 03:33

## 2022-06-19 RX ADMIN — CEFTRIAXONE 100 MILLIGRAM(S): 500 INJECTION, POWDER, FOR SOLUTION INTRAMUSCULAR; INTRAVENOUS at 00:17

## 2022-06-19 RX ADMIN — Medication 0.2 MILLIGRAM(S): at 00:42

## 2022-06-19 RX ADMIN — AMLODIPINE BESYLATE 10 MILLIGRAM(S): 2.5 TABLET ORAL at 00:42

## 2022-06-19 RX ADMIN — Medication 3 MILLIGRAM(S): at 21:44

## 2022-06-19 RX ADMIN — Medication 650 MILLIGRAM(S): at 21:43

## 2022-06-19 RX ADMIN — SODIUM CHLORIDE 100 MILLILITER(S): 9 INJECTION, SOLUTION INTRAVENOUS at 22:29

## 2022-06-19 RX ADMIN — ENOXAPARIN SODIUM 40 MILLIGRAM(S): 100 INJECTION SUBCUTANEOUS at 04:31

## 2022-06-19 RX ADMIN — SODIUM CHLORIDE 100 MILLILITER(S): 9 INJECTION, SOLUTION INTRAVENOUS at 13:11

## 2022-06-19 RX ADMIN — SODIUM POLYSTYRENE SULFONATE 15 GRAM(S): 4.1 POWDER, FOR SUSPENSION ORAL at 02:38

## 2022-06-19 RX ADMIN — SODIUM ZIRCONIUM CYCLOSILICATE 10 GRAM(S): 10 POWDER, FOR SUSPENSION ORAL at 21:44

## 2022-06-19 RX ADMIN — Medication 60 MILLIGRAM(S): at 16:32

## 2022-06-19 NOTE — PHYSICAL THERAPY INITIAL EVALUATION ADULT - GENERAL OBSERVATIONS, REHAB EVAL
Pt found semi supine in bed in NAD, +hep lock, +tele, +ostomy intact, agreeable to PT Lurdes and RN Belkys aware.

## 2022-06-19 NOTE — H&P ADULT - NSHPPHYSICALEXAM_GEN_ALL_CORE
Constitutional: NAD conused  HEENT PERRLA EOMI  CV RRR S1S2  Pulm CTA b/l   GI soft nontender nondistended + BS ostomy bag replaced  Neuro CN II-XII grossly intact   Extremities no edema or calf tenderness

## 2022-06-19 NOTE — PHYSICAL THERAPY INITIAL EVALUATION ADULT - ADDITIONAL COMMENTS
Pt states he lives alone in a shelter, 3 GAVIN with HR and +elevator access. Pt states he is independent with ADLs and functional mobility using a rollator. Pt also owns a cane which he uses for negotiating steps.

## 2022-06-19 NOTE — PATIENT PROFILE ADULT - FALL HARM RISK - RISK INTERVENTIONS

## 2022-06-19 NOTE — PROGRESS NOTE ADULT - ASSESSMENT
· Assessment	  This is a 61 yo m with unknown hx sent to ED from [facility] with ostomy pw ostomy bag break. In Ed he appears confused and unable to provide history, He is hypertensive 173/53 hr 62. was given clonidine 0.2 and amlodipine 10 and BP dropped to 110 systolic and hr down to 40s SB while he is sleeping. UA + UTI. labs significant for wbc 10.3, hgb 10.6, na 132 improved to 135, k 6.6 improved to 5.1 with insulin, glucose and CaGluconate (no ekg changes). also found to be in renal failure bun/creat 63/2.59 improved to 60/2.44. TSH wnl, pBNP 730, utox (-) CT head unremarkable for acute pathology. given a dose of rocephin in ed.     Acute metabolic encephalopathy   UTI severe sepsis   anemia   hyperkalemia   hyponatremia   katrin on presumed ckd  -telemetry   -cw rocephin IV day 2  -f/u cultures   -IV hydration, BP now elevated 150s but nifedipine was incorrectly held. will administer nifedipine 60 d  -hr 50's-40s sb in sleep due to increased vagal tone   -one dose of kayexalate 15, f/u K  -anemia workup   -renal US   -renal consult saw patient and doesnt recommend HD  -regular diet

## 2022-06-19 NOTE — H&P ADULT - ASSESSMENT
This is a 63 yo m with unknown hx sent to ED from [facility] with ostomy pw ostomy bag break. In Ed he appears confused and unable to provide history, He is hypertensive 173/53 hr 62. was given clonidine 0.2 and amlodipine 10 and BP dropped to 110 systolic and hr down to 40s SB while he is sleeping. UA + UTI. labs significant for wbc 10.3, hgb 10.6, na 132 improved to 135, k 6.6 improved to 5.1 with insulin, glucose and CaGluconate (no ekg changes). also found to be in renal failure bun/creat 63/2.59 improved to 60/2.44. TSH wnl, pBNP 730, utox (-) CT head unremarkable for acute pathology. given a dose of rocephin in ed.     Acute metabolic encephalopathy   UTI severe sepsis   anemia   hyperkalemia   hyponatremia   katrin on presumed ckd  -telemetry   -cw rocephin IV daily   -f/u cultures   -IV hydration, gave 500 cc bolus overnight in addition to standing fluids (due to BP drop as above)  -start on nifedipine 60 d when bp stabilizes (overnight drop likley due to meds and increased vagal tone)   -one dose on kayexalate 15  -anemia workup   -renal US   -renal consult saw patient and doesnt recommend HD  -regular diet

## 2022-06-19 NOTE — PHYSICAL THERAPY INITIAL EVALUATION ADULT - STAIR PATTERN, REHAB EVAL
step to step AAA (abdominal aortic aneurysm)    Coronary insufficiency    HLD (hyperlipidemia)    HTN (hypertension)    Lung nodule    MI (myocardial infarction)    Smoking

## 2022-06-19 NOTE — H&P ADULT - HISTORY OF PRESENT ILLNESS
This is a 61 yo m with unknown hx sent to ED from [facility] with ostomy pw ostomy bag break. In Ed he appears confused and unable to provide history, He is hypertensive 173/53 hr 62. was given clonidine 0.2 and amlodipine 10 and BP dropped to 110 systolic and hr down to 40s SB while he is sleeping. UA + UTI. labs significant for wbc 10.3, hgb 10.6, na 132 improved to 135, k 6.6 improved to 5.1 with insulin, glucose and CaGluconate (no ekg changes). also found to be in renal failure bun/creat 63/2.59 improved to 60/2.44. TSH wnl, pBNP 730, utox (-) CT head unremarkable for acute pathology. given a dose of rocephin in ed.

## 2022-06-20 LAB
ALBUMIN SERPL ELPH-MCNC: 3.1 G/DL — LOW (ref 3.3–5)
ALP SERPL-CCNC: 113 U/L — SIGNIFICANT CHANGE UP (ref 40–120)
ALT FLD-CCNC: 21 U/L — SIGNIFICANT CHANGE UP (ref 12–78)
ANION GAP SERPL CALC-SCNC: 10 MMOL/L — SIGNIFICANT CHANGE UP (ref 5–17)
AST SERPL-CCNC: 16 U/L — SIGNIFICANT CHANGE UP (ref 15–37)
BILIRUB SERPL-MCNC: 0.2 MG/DL — SIGNIFICANT CHANGE UP (ref 0.2–1.2)
BUN SERPL-MCNC: 46 MG/DL — HIGH (ref 7–23)
CALCIUM SERPL-MCNC: 9.3 MG/DL — SIGNIFICANT CHANGE UP (ref 8.5–10.1)
CHLORIDE SERPL-SCNC: 99 MMOL/L — SIGNIFICANT CHANGE UP (ref 96–108)
CO2 SERPL-SCNC: 25 MMOL/L — SIGNIFICANT CHANGE UP (ref 22–31)
CREAT SERPL-MCNC: 2.26 MG/DL — HIGH (ref 0.5–1.3)
EGFR: 32 ML/MIN/1.73M2 — LOW
FERRITIN SERPL-MCNC: 836 NG/ML — HIGH (ref 30–400)
FOLATE SERPL-MCNC: 18.8 NG/ML — SIGNIFICANT CHANGE UP
GLUCOSE BLDC GLUCOMTR-MCNC: 204 MG/DL — HIGH (ref 70–99)
GLUCOSE BLDC GLUCOMTR-MCNC: 280 MG/DL — HIGH (ref 70–99)
GLUCOSE BLDC GLUCOMTR-MCNC: 439 MG/DL — HIGH (ref 70–99)
GLUCOSE SERPL-MCNC: 339 MG/DL — HIGH (ref 70–99)
HAPTOGLOB SERPL-MCNC: 229 MG/DL — HIGH (ref 34–200)
HCT VFR BLD CALC: 27.2 % — LOW (ref 39–50)
HGB BLD-MCNC: 9.4 G/DL — LOW (ref 13–17)
IGA FLD-MCNC: 314 MG/DL — SIGNIFICANT CHANGE UP (ref 84–499)
IGG FLD-MCNC: 1888 MG/DL — HIGH (ref 610–1660)
IGM SERPL-MCNC: 38 MG/DL — SIGNIFICANT CHANGE UP (ref 35–242)
IRON SATN MFR SERPL: 19 % — SIGNIFICANT CHANGE UP (ref 16–55)
IRON SATN MFR SERPL: 40 UG/DL — LOW (ref 45–165)
KAPPA LC SER QL IFE: 11.69 MG/DL — HIGH (ref 0.33–1.94)
KAPPA/LAMBDA FREE LIGHT CHAIN RATIO, SERUM: 2.66 RATIO — HIGH (ref 0.26–1.65)
LAMBDA LC SER QL IFE: 4.39 MG/DL — HIGH (ref 0.57–2.63)
LDH SERPL L TO P-CCNC: 136 U/L — SIGNIFICANT CHANGE UP (ref 50–242)
MCHC RBC-ENTMCNC: 30.9 PG — SIGNIFICANT CHANGE UP (ref 27–34)
MCHC RBC-ENTMCNC: 34.6 G/DL — SIGNIFICANT CHANGE UP (ref 32–36)
MCV RBC AUTO: 89.5 FL — SIGNIFICANT CHANGE UP (ref 80–100)
NRBC # BLD: 0 /100 WBCS — SIGNIFICANT CHANGE UP (ref 0–0)
PLATELET # BLD AUTO: 156 K/UL — SIGNIFICANT CHANGE UP (ref 150–400)
POTASSIUM SERPL-MCNC: 4.3 MMOL/L — SIGNIFICANT CHANGE UP (ref 3.5–5.3)
POTASSIUM SERPL-SCNC: 4.3 MMOL/L — SIGNIFICANT CHANGE UP (ref 3.5–5.3)
PROT ?TM UR-MCNC: 139 MG/DL — HIGH (ref 0–12)
PROT SERPL-MCNC: 8.2 GM/DL — SIGNIFICANT CHANGE UP (ref 6–8.3)
RBC # BLD: 3.04 M/UL — LOW (ref 4.2–5.8)
RBC # FLD: 14.1 % — SIGNIFICANT CHANGE UP (ref 10.3–14.5)
SODIUM SERPL-SCNC: 134 MMOL/L — LOW (ref 135–145)
TIBC SERPL-MCNC: 214 UG/DL — LOW (ref 220–430)
TRANSFERRIN SERPL-MCNC: 188 MG/DL — LOW (ref 200–360)
UIBC SERPL-MCNC: 174 UG/DL — SIGNIFICANT CHANGE UP (ref 110–370)
VIT B12 SERPL-MCNC: 953 PG/ML — SIGNIFICANT CHANGE UP (ref 232–1245)
WBC # BLD: 5.72 K/UL — SIGNIFICANT CHANGE UP (ref 3.8–10.5)
WBC # FLD AUTO: 5.72 K/UL — SIGNIFICANT CHANGE UP (ref 3.8–10.5)

## 2022-06-20 RX ORDER — DEXTROSE 50 % IN WATER 50 %
15 SYRINGE (ML) INTRAVENOUS ONCE
Refills: 0 | Status: DISCONTINUED | OUTPATIENT
Start: 2022-06-20 | End: 2022-06-21

## 2022-06-20 RX ORDER — DEXTROSE 50 % IN WATER 50 %
25 SYRINGE (ML) INTRAVENOUS ONCE
Refills: 0 | Status: DISCONTINUED | OUTPATIENT
Start: 2022-06-20 | End: 2022-06-21

## 2022-06-20 RX ORDER — INSULIN LISPRO 100/ML
VIAL (ML) SUBCUTANEOUS
Refills: 0 | Status: DISCONTINUED | OUTPATIENT
Start: 2022-06-20 | End: 2022-06-21

## 2022-06-20 RX ORDER — INSULIN LISPRO 100/ML
VIAL (ML) SUBCUTANEOUS AT BEDTIME
Refills: 0 | Status: DISCONTINUED | OUTPATIENT
Start: 2022-06-20 | End: 2022-06-21

## 2022-06-20 RX ORDER — HYDRALAZINE HCL 50 MG
10 TABLET ORAL ONCE
Refills: 0 | Status: COMPLETED | OUTPATIENT
Start: 2022-06-20 | End: 2022-06-20

## 2022-06-20 RX ORDER — CHLORHEXIDINE GLUCONATE 213 G/1000ML
1 SOLUTION TOPICAL DAILY
Refills: 0 | Status: DISCONTINUED | OUTPATIENT
Start: 2022-06-20 | End: 2022-06-24

## 2022-06-20 RX ORDER — DEXTROSE 50 % IN WATER 50 %
12.5 SYRINGE (ML) INTRAVENOUS ONCE
Refills: 0 | Status: DISCONTINUED | OUTPATIENT
Start: 2022-06-20 | End: 2022-06-21

## 2022-06-20 RX ORDER — GLUCAGON INJECTION, SOLUTION 0.5 MG/.1ML
1 INJECTION, SOLUTION SUBCUTANEOUS ONCE
Refills: 0 | Status: DISCONTINUED | OUTPATIENT
Start: 2022-06-20 | End: 2022-06-21

## 2022-06-20 RX ORDER — SODIUM CHLORIDE 9 MG/ML
1000 INJECTION, SOLUTION INTRAVENOUS
Refills: 0 | Status: DISCONTINUED | OUTPATIENT
Start: 2022-06-20 | End: 2022-06-21

## 2022-06-20 RX ADMIN — Medication 60 MILLIGRAM(S): at 05:23

## 2022-06-20 RX ADMIN — Medication 650 MILLIGRAM(S): at 21:23

## 2022-06-20 RX ADMIN — Medication 10 MILLIGRAM(S): at 23:52

## 2022-06-20 RX ADMIN — Medication 650 MILLIGRAM(S): at 22:10

## 2022-06-20 RX ADMIN — SODIUM ZIRCONIUM CYCLOSILICATE 10 GRAM(S): 10 POWDER, FOR SUSPENSION ORAL at 05:24

## 2022-06-20 RX ADMIN — Medication 4: at 17:05

## 2022-06-20 RX ADMIN — Medication 650 MILLIGRAM(S): at 13:09

## 2022-06-20 RX ADMIN — Medication 650 MILLIGRAM(S): at 05:23

## 2022-06-20 RX ADMIN — CHLORHEXIDINE GLUCONATE 1 APPLICATION(S): 213 SOLUTION TOPICAL at 11:19

## 2022-06-20 RX ADMIN — ENOXAPARIN SODIUM 40 MILLIGRAM(S): 100 INJECTION SUBCUTANEOUS at 05:29

## 2022-06-20 RX ADMIN — Medication 3 MILLIGRAM(S): at 21:33

## 2022-06-20 RX ADMIN — Medication 12: at 11:19

## 2022-06-20 RX ADMIN — CEFTRIAXONE 100 MILLIGRAM(S): 500 INJECTION, POWDER, FOR SOLUTION INTRAMUSCULAR; INTRAVENOUS at 02:52

## 2022-06-20 RX ADMIN — Medication 1: at 21:24

## 2022-06-20 RX ADMIN — Medication 650 MILLIGRAM(S): at 14:56

## 2022-06-20 NOTE — OCCUPATIONAL THERAPY INITIAL EVALUATION ADULT - BALANCE TRAINING, PT EVAL
Patient will be able to increase static and dynamic standing by 1/2 grade in order to participate in self care tasks and functional mobility/transfers within 1-2 weeks

## 2022-06-20 NOTE — OCCUPATIONAL THERAPY INITIAL EVALUATION ADULT - ADL RETRAINING, OT EVAL
Patient will be able to perform functional tasks with independence, using compensatory strategies, within 2-3 weeks.

## 2022-06-20 NOTE — CHART NOTE - NSCHARTNOTEFT_GEN_A_CORE
medical records request sent to [facility]. release form signed by patient and faxed to [facility] medical records department.

## 2022-06-20 NOTE — OCCUPATIONAL THERAPY INITIAL EVALUATION ADULT - GENERAL OBSERVATIONS, REHAB EVAL
Pt encountered semisupine in bed, NAD, AXOX2, forgetful, +heplock, +colostomy intact, +cardiac monitor, no c/o discomfort, agreeable to OT Evaluation.

## 2022-06-20 NOTE — OCCUPATIONAL THERAPY INITIAL EVALUATION ADULT - PERTINENT HX OF CURRENT PROBLEM, REHAB EVAL
Pt is 63 yo M with unknown hx sent to [facility] ED from [facility] c/o ostomy bag break.

## 2022-06-20 NOTE — PROGRESS NOTE ADULT - ASSESSMENT
62 YOM ? PMHx to ED from [facility] pw confusion, ostomy bag break, unable to provide history.  Found to have UTI, hyperkalemia, ? CHARLES vs CKD.     weakness and tremor - pt consult.  need to obtain records from [facility] as well as medication list to better determine possible causes and treatment options.    ? acute kidney injury with CKD? stage 3 - obtain records.  continue trending     hyperkalemia - improved with hydration and kayexylate    UTI with GNRs - 3 day course of rocephin appropriate    anemia likely from CKD - lab workup pending    vte prophylaxis    dispo - pending records, ongoing PT / CM / SW evaluations

## 2022-06-20 NOTE — OCCUPATIONAL THERAPY INITIAL EVALUATION ADULT - STRENGTHENING, PT EVAL
Patient will increase bilateral upper extremity strength to 5/5 to improve functional strength needed to engage in functional tasks by 3-4 weeks

## 2022-06-21 LAB
-  AMIKACIN: SIGNIFICANT CHANGE UP
-  AMOXICILLIN/CLAVULANIC ACID: SIGNIFICANT CHANGE UP
-  AMPICILLIN/SULBACTAM: SIGNIFICANT CHANGE UP
-  AMPICILLIN: SIGNIFICANT CHANGE UP
-  AZTREONAM: SIGNIFICANT CHANGE UP
-  CEFAZOLIN: SIGNIFICANT CHANGE UP
-  CEFEPIME: SIGNIFICANT CHANGE UP
-  CEFOXITIN: SIGNIFICANT CHANGE UP
-  CEFTRIAXONE: SIGNIFICANT CHANGE UP
-  CIPROFLOXACIN: SIGNIFICANT CHANGE UP
-  ERTAPENEM: SIGNIFICANT CHANGE UP
-  GENTAMICIN: SIGNIFICANT CHANGE UP
-  IMIPENEM: SIGNIFICANT CHANGE UP
-  LEVOFLOXACIN: SIGNIFICANT CHANGE UP
-  MEROPENEM: SIGNIFICANT CHANGE UP
-  NITROFURANTOIN: SIGNIFICANT CHANGE UP
-  PIPERACILLIN/TAZOBACTAM: SIGNIFICANT CHANGE UP
-  TIGECYCLINE: SIGNIFICANT CHANGE UP
-  TOBRAMYCIN: SIGNIFICANT CHANGE UP
-  TRIMETHOPRIM/SULFAMETHOXAZOLE: SIGNIFICANT CHANGE UP
A1C WITH ESTIMATED AVERAGE GLUCOSE RESULT: 8.5 % — HIGH (ref 4–5.6)
ANION GAP SERPL CALC-SCNC: 11 MMOL/L — SIGNIFICANT CHANGE UP (ref 5–17)
BUN SERPL-MCNC: 43 MG/DL — HIGH (ref 7–23)
CALCIUM SERPL-MCNC: 9.3 MG/DL — SIGNIFICANT CHANGE UP (ref 8.5–10.1)
CHLORIDE SERPL-SCNC: 100 MMOL/L — SIGNIFICANT CHANGE UP (ref 96–108)
CO2 SERPL-SCNC: 24 MMOL/L — SIGNIFICANT CHANGE UP (ref 22–31)
CREAT SERPL-MCNC: 2.59 MG/DL — HIGH (ref 0.5–1.3)
CULTURE RESULTS: SIGNIFICANT CHANGE UP
EGFR: 27 ML/MIN/1.73M2 — LOW
ESTIMATED AVERAGE GLUCOSE: 197 MG/DL — HIGH (ref 68–114)
GLUCOSE BLDC GLUCOMTR-MCNC: 220 MG/DL — HIGH (ref 70–99)
GLUCOSE BLDC GLUCOMTR-MCNC: 271 MG/DL — HIGH (ref 70–99)
GLUCOSE BLDC GLUCOMTR-MCNC: 280 MG/DL — HIGH (ref 70–99)
GLUCOSE BLDC GLUCOMTR-MCNC: 314 MG/DL — HIGH (ref 70–99)
GLUCOSE BLDC GLUCOMTR-MCNC: 367 MG/DL — HIGH (ref 70–99)
GLUCOSE SERPL-MCNC: 370 MG/DL — HIGH (ref 70–99)
HCV AB S/CO SERPL IA: 0.12 S/CO — SIGNIFICANT CHANGE UP (ref 0–0.99)
HCV AB SERPL-IMP: SIGNIFICANT CHANGE UP
METHOD TYPE: SIGNIFICANT CHANGE UP
ORGANISM # SPEC MICROSCOPIC CNT: SIGNIFICANT CHANGE UP
ORGANISM # SPEC MICROSCOPIC CNT: SIGNIFICANT CHANGE UP
POTASSIUM SERPL-MCNC: 4.1 MMOL/L — SIGNIFICANT CHANGE UP (ref 3.5–5.3)
POTASSIUM SERPL-SCNC: 4.1 MMOL/L — SIGNIFICANT CHANGE UP (ref 3.5–5.3)
SODIUM SERPL-SCNC: 135 MMOL/L — SIGNIFICANT CHANGE UP (ref 135–145)
SPECIMEN SOURCE: SIGNIFICANT CHANGE UP

## 2022-06-21 RX ORDER — INSULIN LISPRO 100/ML
VIAL (ML) SUBCUTANEOUS AT BEDTIME
Refills: 0 | Status: DISCONTINUED | OUTPATIENT
Start: 2022-06-21 | End: 2022-06-24

## 2022-06-21 RX ORDER — INSULIN LISPRO 100/ML
4 VIAL (ML) SUBCUTANEOUS
Refills: 0 | Status: DISCONTINUED | OUTPATIENT
Start: 2022-06-21 | End: 2022-06-22

## 2022-06-21 RX ORDER — DEXTROSE 50 % IN WATER 50 %
12.5 SYRINGE (ML) INTRAVENOUS ONCE
Refills: 0 | Status: DISCONTINUED | OUTPATIENT
Start: 2022-06-21 | End: 2022-06-24

## 2022-06-21 RX ORDER — DEXTROSE 50 % IN WATER 50 %
25 SYRINGE (ML) INTRAVENOUS ONCE
Refills: 0 | Status: DISCONTINUED | OUTPATIENT
Start: 2022-06-21 | End: 2022-06-24

## 2022-06-21 RX ORDER — GLUCAGON INJECTION, SOLUTION 0.5 MG/.1ML
1 INJECTION, SOLUTION SUBCUTANEOUS ONCE
Refills: 0 | Status: DISCONTINUED | OUTPATIENT
Start: 2022-06-21 | End: 2022-06-24

## 2022-06-21 RX ORDER — DEXTROSE 50 % IN WATER 50 %
15 SYRINGE (ML) INTRAVENOUS ONCE
Refills: 0 | Status: DISCONTINUED | OUTPATIENT
Start: 2022-06-21 | End: 2022-06-24

## 2022-06-21 RX ORDER — INSULIN LISPRO 100/ML
VIAL (ML) SUBCUTANEOUS
Refills: 0 | Status: DISCONTINUED | OUTPATIENT
Start: 2022-06-21 | End: 2022-06-24

## 2022-06-21 RX ORDER — SODIUM CHLORIDE 9 MG/ML
1000 INJECTION, SOLUTION INTRAVENOUS
Refills: 0 | Status: DISCONTINUED | OUTPATIENT
Start: 2022-06-21 | End: 2022-06-24

## 2022-06-21 RX ORDER — HYDRALAZINE HCL 50 MG
25 TABLET ORAL ONCE
Refills: 0 | Status: COMPLETED | OUTPATIENT
Start: 2022-06-21 | End: 2022-06-21

## 2022-06-21 RX ORDER — INSULIN GLARGINE 100 [IU]/ML
14 INJECTION, SOLUTION SUBCUTANEOUS EVERY MORNING
Refills: 0 | Status: DISCONTINUED | OUTPATIENT
Start: 2022-06-21 | End: 2022-06-22

## 2022-06-21 RX ADMIN — CEFTRIAXONE 100 MILLIGRAM(S): 500 INJECTION, POWDER, FOR SOLUTION INTRAMUSCULAR; INTRAVENOUS at 01:31

## 2022-06-21 RX ADMIN — Medication 25 MILLIGRAM(S): at 18:49

## 2022-06-21 RX ADMIN — Medication 650 MILLIGRAM(S): at 11:00

## 2022-06-21 RX ADMIN — ENOXAPARIN SODIUM 40 MILLIGRAM(S): 100 INJECTION SUBCUTANEOUS at 04:59

## 2022-06-21 RX ADMIN — Medication 6: at 08:43

## 2022-06-21 RX ADMIN — Medication 650 MILLIGRAM(S): at 10:03

## 2022-06-21 RX ADMIN — Medication 4 UNIT(S): at 11:30

## 2022-06-21 RX ADMIN — Medication 60 MILLIGRAM(S): at 05:01

## 2022-06-21 RX ADMIN — Medication 3: at 17:04

## 2022-06-21 RX ADMIN — Medication 3 MILLIGRAM(S): at 21:53

## 2022-06-21 RX ADMIN — INSULIN GLARGINE 14 UNIT(S): 100 INJECTION, SOLUTION SUBCUTANEOUS at 11:31

## 2022-06-21 RX ADMIN — Medication 650 MILLIGRAM(S): at 22:16

## 2022-06-21 RX ADMIN — Medication 5: at 11:30

## 2022-06-21 RX ADMIN — CHLORHEXIDINE GLUCONATE 1 APPLICATION(S): 213 SOLUTION TOPICAL at 11:33

## 2022-06-21 RX ADMIN — Medication 650 MILLIGRAM(S): at 21:50

## 2022-06-21 RX ADMIN — Medication 4 UNIT(S): at 17:05

## 2022-06-21 NOTE — PROGRESS NOTE ADULT - ASSESSMENT
62 YOM ? PMHx to ED from [facility] pw confusion, ostomy bag break, unable to provide history.  Found to have UTI, hyperkalemia, ? CHARLES vs CKD.  Mental status improved, trying to get records on patient history.    weakness and tremor - therpay consults appreciated.  Awaiting records to review.  Discuss with therapy and CM/SW what our options are, I suspect symptoms are all related to progressive deconditioning with perhaps some contribution of medications.    ? acute kidney injury with CKD? stage 3 - obtain records.  awaiting labs this morning    hyperkalemia - improved with hydration and kayexylate    UTI with GNRs - 3 day course of rocephin appropriate    anemia likely from CKD    vte prophylaxis    dispo - pending records, ongoing PT / CM / SW evaluations.  he is not a citizen, no insurance.

## 2022-06-22 LAB
ANION GAP SERPL CALC-SCNC: 11 MMOL/L — SIGNIFICANT CHANGE UP (ref 5–17)
BUN SERPL-MCNC: 45 MG/DL — HIGH (ref 7–23)
CALCIUM SERPL-MCNC: 9.8 MG/DL — SIGNIFICANT CHANGE UP (ref 8.5–10.1)
CHLORIDE SERPL-SCNC: 102 MMOL/L — SIGNIFICANT CHANGE UP (ref 96–108)
CO2 SERPL-SCNC: 22 MMOL/L — SIGNIFICANT CHANGE UP (ref 22–31)
CREAT SERPL-MCNC: 2.42 MG/DL — HIGH (ref 0.5–1.3)
EGFR: 29 ML/MIN/1.73M2 — LOW
GLUCOSE BLDC GLUCOMTR-MCNC: 233 MG/DL — HIGH (ref 70–99)
GLUCOSE BLDC GLUCOMTR-MCNC: 248 MG/DL — HIGH (ref 70–99)
GLUCOSE BLDC GLUCOMTR-MCNC: 281 MG/DL — HIGH (ref 70–99)
GLUCOSE BLDC GLUCOMTR-MCNC: 287 MG/DL — HIGH (ref 70–99)
GLUCOSE SERPL-MCNC: 220 MG/DL — HIGH (ref 70–99)
MAGNESIUM SERPL-MCNC: 2.3 MG/DL — SIGNIFICANT CHANGE UP (ref 1.6–2.6)
POTASSIUM SERPL-MCNC: 4.1 MMOL/L — SIGNIFICANT CHANGE UP (ref 3.5–5.3)
POTASSIUM SERPL-SCNC: 4.1 MMOL/L — SIGNIFICANT CHANGE UP (ref 3.5–5.3)
SODIUM SERPL-SCNC: 135 MMOL/L — SIGNIFICANT CHANGE UP (ref 135–145)

## 2022-06-22 RX ORDER — LEVOTHYROXINE SODIUM 125 MCG
1 TABLET ORAL
Qty: 0 | Refills: 0 | DISCHARGE

## 2022-06-22 RX ORDER — GABAPENTIN 400 MG/1
1 CAPSULE ORAL
Qty: 0 | Refills: 0 | DISCHARGE

## 2022-06-22 RX ORDER — HYDRALAZINE HCL 50 MG
25 TABLET ORAL ONCE
Refills: 0 | Status: COMPLETED | OUTPATIENT
Start: 2022-06-22 | End: 2022-06-22

## 2022-06-22 RX ORDER — LEVOTHYROXINE SODIUM 125 MCG
50 TABLET ORAL DAILY
Refills: 0 | Status: DISCONTINUED | OUTPATIENT
Start: 2022-06-22 | End: 2022-06-24

## 2022-06-22 RX ORDER — NIFEDIPINE 30 MG
1 TABLET, EXTENDED RELEASE 24 HR ORAL
Qty: 0 | Refills: 0 | DISCHARGE

## 2022-06-22 RX ORDER — NIFEDIPINE 30 MG
90 TABLET, EXTENDED RELEASE 24 HR ORAL DAILY
Refills: 0 | Status: DISCONTINUED | OUTPATIENT
Start: 2022-06-23 | End: 2022-06-24

## 2022-06-22 RX ORDER — NIFEDIPINE 30 MG
30 TABLET, EXTENDED RELEASE 24 HR ORAL ONCE
Refills: 0 | Status: COMPLETED | OUTPATIENT
Start: 2022-06-22 | End: 2022-06-22

## 2022-06-22 RX ORDER — PANTOPRAZOLE SODIUM 20 MG/1
1 TABLET, DELAYED RELEASE ORAL
Qty: 0 | Refills: 0 | DISCHARGE

## 2022-06-22 RX ORDER — INSULIN LISPRO 100/ML
7 VIAL (ML) SUBCUTANEOUS
Refills: 0 | Status: DISCONTINUED | OUTPATIENT
Start: 2022-06-22 | End: 2022-06-24

## 2022-06-22 RX ORDER — INSULIN GLARGINE 100 [IU]/ML
20 INJECTION, SOLUTION SUBCUTANEOUS EVERY MORNING
Refills: 0 | Status: DISCONTINUED | OUTPATIENT
Start: 2022-06-22 | End: 2022-06-24

## 2022-06-22 RX ADMIN — CHLORHEXIDINE GLUCONATE 1 APPLICATION(S): 213 SOLUTION TOPICAL at 11:26

## 2022-06-22 RX ADMIN — Medication 650 MILLIGRAM(S): at 18:11

## 2022-06-22 RX ADMIN — Medication 2: at 17:01

## 2022-06-22 RX ADMIN — Medication 650 MILLIGRAM(S): at 08:46

## 2022-06-22 RX ADMIN — Medication 650 MILLIGRAM(S): at 23:30

## 2022-06-22 RX ADMIN — Medication 3: at 11:25

## 2022-06-22 RX ADMIN — Medication 2: at 08:43

## 2022-06-22 RX ADMIN — ENOXAPARIN SODIUM 40 MILLIGRAM(S): 100 INJECTION SUBCUTANEOUS at 05:18

## 2022-06-22 RX ADMIN — Medication 7 UNIT(S): at 17:02

## 2022-06-22 RX ADMIN — Medication 4 UNIT(S): at 08:42

## 2022-06-22 RX ADMIN — Medication 650 MILLIGRAM(S): at 17:02

## 2022-06-22 RX ADMIN — Medication 650 MILLIGRAM(S): at 09:40

## 2022-06-22 RX ADMIN — Medication 25 MILLIGRAM(S): at 11:22

## 2022-06-22 RX ADMIN — Medication 3 MILLIGRAM(S): at 21:23

## 2022-06-22 RX ADMIN — INSULIN GLARGINE 14 UNIT(S): 100 INJECTION, SOLUTION SUBCUTANEOUS at 08:43

## 2022-06-22 RX ADMIN — Medication 1: at 21:22

## 2022-06-22 RX ADMIN — Medication 60 MILLIGRAM(S): at 05:18

## 2022-06-22 RX ADMIN — Medication 30 MILLIGRAM(S): at 12:52

## 2022-06-22 RX ADMIN — Medication 4 UNIT(S): at 11:25

## 2022-06-22 NOTE — PROGRESS NOTE ADULT - ASSESSMENT
62 YOM ? PMHx to ED from [facility] pw confusion, ostomy bag break, unable to provide history.  Found to have UTI, hyperkalemia.  Baseline renal function seems to be around sCr of 2.1-2.5    weakness and tremor - related to overall deconditioning.  CM / SW working in partnership with his case workers at [facility] to get him into [facility] supportive transitional housing.  He should NOT continue with gabapentin or trazodone, or other sedating medications.  He has been doing fine without here, and recommend tylenol for pain or melatonin for sleep if necessary; these should be provided as medications at discharge on his list.    CKD stage 3, baseline sCr 2.1-2.5 on review of records    hyperkalemia - improved with hydration and kayexylate.  just monitor.  no medications that affect renal function or cause hyperkalemia    UTI with GNRs - treatment completed, no further symptoms    anemia likely from CKD    vte prophylaxis    dispo - as above working on in collaboration with primary team at [facility] 62 YOM PMHx IDDMII, hypothyroidism, hypertension, s/p colostomy, previous issues with electrolyte disturbances inc hyponatremia, CKD III baseline sCr 2.1-2.5, presents to ED from [facility] shelter withconfusion, ostomy bag break, unable to provide history.  Found to have UTI, hyperkalemia.  Baseline renal function seems to be around sCr of 2.1-2.5.      weakness and tremor - related to overall deconditioning.  CM / SW working in partnership with his case workers at [facility] to get him into [facility] transitional housing.  He should NOT continue with gabapentin or trazodone, or other sedating medications.  He has been doing fine without here, and recommend tylenol for pain or melatonin for sleep if necessary; these should be provided as medications at discharge on his list.    HTN - increase nifed XL to 90mg daily.  x1 dose of hydralazine; chronically he should be on 1x daily medications if possible, next step would be maxing out nifed to 120mg daily.  Would avoid diuretics, ACE / ARB due to side effects for additional BP control.    CKD stage 3, baseline sCr 2.1-2.5 on review of records    hyperkalemia - improved with hydration and kayexylate.  just monitor.  no medications that affect renal function or cause hyperkalemia    UTI with GNRs - treatment completed, no further symptoms    hypothyroid - resume synthroid.  check TFTs in AM    anemia likely from CKD    vte prophylaxis    dispo - as above working on in collaboration with primary team at [facility] number [phone]  PCP visit will be scheduled for him on 7/6/22 in AM.  Please let clinic know if labs are required before visit and they can help arrange.

## 2022-06-23 LAB
% ALBUMIN: 50.1 % — SIGNIFICANT CHANGE UP
% ALPHA 1: 3.6 % — SIGNIFICANT CHANGE UP
% ALPHA 2: 10.2 % — SIGNIFICANT CHANGE UP
% BETA: 11.8 % — SIGNIFICANT CHANGE UP
% GAMMA: 24.3 % — SIGNIFICANT CHANGE UP
% M SPIKE: SIGNIFICANT CHANGE UP
ALBUMIN SERPL ELPH-MCNC: 3.9 G/DL — SIGNIFICANT CHANGE UP (ref 3.6–5.5)
ALBUMIN/GLOB SERPL ELPH: 1 RATIO — SIGNIFICANT CHANGE UP
ALPHA1 GLOB SERPL ELPH-MCNC: 0.3 G/DL — SIGNIFICANT CHANGE UP (ref 0.1–0.4)
ALPHA2 GLOB SERPL ELPH-MCNC: 0.8 G/DL — SIGNIFICANT CHANGE UP (ref 0.5–1)
B-GLOBULIN SERPL ELPH-MCNC: 0.9 G/DL — SIGNIFICANT CHANGE UP (ref 0.5–1)
GAMMA GLOBULIN: 1.9 G/DL — HIGH (ref 0.6–1.6)
GLUCOSE BLDC GLUCOMTR-MCNC: 196 MG/DL — HIGH (ref 70–99)
GLUCOSE BLDC GLUCOMTR-MCNC: 240 MG/DL — HIGH (ref 70–99)
GLUCOSE BLDC GLUCOMTR-MCNC: 283 MG/DL — HIGH (ref 70–99)
GLUCOSE BLDC GLUCOMTR-MCNC: 290 MG/DL — HIGH (ref 70–99)
INTERPRETATION SERPL IFE-IMP: SIGNIFICANT CHANGE UP
M-SPIKE: SIGNIFICANT CHANGE UP (ref 0–0)
PROT PATTERN SERPL ELPH-IMP: SIGNIFICANT CHANGE UP
PROT SERPL-MCNC: 7.8 G/DL — SIGNIFICANT CHANGE UP (ref 6–8.3)
T4 FREE SERPL-MCNC: 1.3 NG/DL — SIGNIFICANT CHANGE UP (ref 0.9–1.8)
TSH SERPL-MCNC: 4.6 UU/ML — HIGH (ref 0.36–3.74)

## 2022-06-23 RX ORDER — NIFEDIPINE 30 MG
1 TABLET, EXTENDED RELEASE 24 HR ORAL
Qty: 0 | Refills: 0 | DISCHARGE
Start: 2022-06-23

## 2022-06-23 RX ORDER — INSULIN LISPRO 100/ML
7 VIAL (ML) SUBCUTANEOUS
Qty: 0 | Refills: 0 | DISCHARGE
Start: 2022-06-23

## 2022-06-23 RX ORDER — INSULIN GLARGINE 100 [IU]/ML
20 INJECTION, SOLUTION SUBCUTANEOUS
Qty: 0 | Refills: 0 | DISCHARGE
Start: 2022-06-23

## 2022-06-23 RX ORDER — LEVOTHYROXINE SODIUM 125 MCG
1 TABLET ORAL
Qty: 0 | Refills: 0 | DISCHARGE
Start: 2022-06-23

## 2022-06-23 RX ADMIN — Medication 3: at 16:18

## 2022-06-23 RX ADMIN — Medication 7 UNIT(S): at 07:47

## 2022-06-23 RX ADMIN — Medication 650 MILLIGRAM(S): at 09:51

## 2022-06-23 RX ADMIN — Medication 50 MICROGRAM(S): at 05:12

## 2022-06-23 RX ADMIN — ENOXAPARIN SODIUM 40 MILLIGRAM(S): 100 INJECTION SUBCUTANEOUS at 05:12

## 2022-06-23 RX ADMIN — Medication 650 MILLIGRAM(S): at 21:09

## 2022-06-23 RX ADMIN — Medication 1: at 07:46

## 2022-06-23 RX ADMIN — Medication 650 MILLIGRAM(S): at 00:15

## 2022-06-23 RX ADMIN — Medication 90 MILLIGRAM(S): at 05:12

## 2022-06-23 RX ADMIN — Medication 7 UNIT(S): at 16:18

## 2022-06-23 RX ADMIN — Medication 3: at 11:14

## 2022-06-23 RX ADMIN — CHLORHEXIDINE GLUCONATE 1 APPLICATION(S): 213 SOLUTION TOPICAL at 11:15

## 2022-06-23 RX ADMIN — INSULIN GLARGINE 20 UNIT(S): 100 INJECTION, SOLUTION SUBCUTANEOUS at 07:47

## 2022-06-23 RX ADMIN — Medication 7 UNIT(S): at 11:14

## 2022-06-23 RX ADMIN — Medication 650 MILLIGRAM(S): at 10:14

## 2022-06-23 RX ADMIN — Medication 3 MILLIGRAM(S): at 21:09

## 2022-06-23 NOTE — DIETITIAN INITIAL EVALUATION ADULT - PERTINENT MEDS FT
MEDICATIONS  (STANDING):  chlorhexidine 2% Cloths 1 Application(s) Topical daily  dextrose 5%. 1000 milliLiter(s) (100 mL/Hr) IV Continuous <Continuous>  dextrose 5%. 1000 milliLiter(s) (50 mL/Hr) IV Continuous <Continuous>  dextrose 50% Injectable 25 Gram(s) IV Push once  dextrose 50% Injectable 12.5 Gram(s) IV Push once  dextrose 50% Injectable 25 Gram(s) IV Push once  enoxaparin Injectable 40 milliGRAM(s) SubCutaneous every 24 hours  glucagon  Injectable 1 milliGRAM(s) IntraMuscular once  insulin glargine Injectable (LANTUS) 20 Unit(s) SubCutaneous every morning  insulin lispro (ADMELOG) corrective regimen sliding scale   SubCutaneous three times a day before meals  insulin lispro (ADMELOG) corrective regimen sliding scale   SubCutaneous at bedtime  insulin lispro Injectable (ADMELOG) 7 Unit(s) SubCutaneous three times a day before meals  levothyroxine 50 MICROGram(s) Oral daily  NIFEdipine XL 90 milliGRAM(s) Oral daily    MEDICATIONS  (PRN):  acetaminophen     Tablet .. 650 milliGRAM(s) Oral every 6 hours PRN Temp greater or equal to 38C (100.4F), Mild Pain (1 - 3)  aluminum hydroxide/magnesium hydroxide/simethicone Suspension 30 milliLiter(s) Oral every 4 hours PRN Dyspepsia  dextrose Oral Gel 15 Gram(s) Oral once PRN Blood Glucose LESS THAN 70 milliGRAM(s)/deciliter  melatonin 3 milliGRAM(s) Oral at bedtime PRN Insomnia  ondansetron Injectable 4 milliGRAM(s) IV Push every 8 hours PRN Nausea and/or Vomiting

## 2022-06-23 NOTE — DIETITIAN INITIAL EVALUATION ADULT - NS FNS DIET ORDER
Diet, Consistent Carbohydrate Renal w/Evening Snack:   No Concentrated Potassium (06-21-22 @ 07:56) [Active]

## 2022-06-23 NOTE — DIETITIAN INITIAL EVALUATION ADULT - PERTINENT LABORATORY DATA
06-22    135  |  102  |  45<H>  ----------------------------<  220<H>  4.1   |  22  |  2.42<H>    Ca    9.8      22 Jun 2022 07:55  Mg     2.3     06-22    POCT Blood Glucose.: 290 mg/dL (06-23-22 @ 10:59)  A1C with Estimated Average Glucose Result: 8.5 % (06-21-22 @ 08:45)    06-22 Finger Sticks 233-287 mg/dL

## 2022-06-23 NOTE — DISCHARGE NOTE PROVIDER - NSDCCPCAREPLAN_GEN_ALL_CORE_FT
PRINCIPAL DISCHARGE DIAGNOSIS  Diagnosis: Sepsis secondary to UTI  Assessment and Plan of Treatment:       SECONDARY DISCHARGE DIAGNOSES  Diagnosis: Hyperkalemia  Assessment and Plan of Treatment:     Diagnosis: Acute renal failure  Assessment and Plan of Treatment:     Diagnosis: Altered mental state  Assessment and Plan of Treatment:

## 2022-06-23 NOTE — DIETITIAN INITIAL EVALUATION ADULT - NSICDXPASTMEDICALHX_GEN_ALL_CORE_FT
PAST MEDICAL HISTORY:  Colostomy present     HTN (hypertension)     T2DM (type 2 diabetes mellitus)

## 2022-06-23 NOTE — DISCHARGE NOTE PROVIDER - DETAILS OF MALNUTRITION DIAGNOSIS/DIAGNOSES
This patient has been assessed with a concern for Malnutrition and was treated during this hospitalization for the following Nutrition diagnosis/diagnoses:     -  06/23/2022: Moderate protein-calorie malnutrition

## 2022-06-23 NOTE — DISCHARGE NOTE PROVIDER - CARE PROVIDER_API CALL
pcp,   PCP visit will be scheduled for him on 7/6/22 in AM.  Phone: (   )    -  Fax: (   )    -  Follow Up Time:

## 2022-06-23 NOTE — DIETITIAN INITIAL EVALUATION ADULT - OTHER INFO
Pt reports variable appetite and PO intake PTA; reports this is his baseline x 5 or so years. Reports his shelter provides him with meals PTA.     Pt reports good appetite and PO intake during LOS. Per flow sheets consuming % of documented meals. Denies difficulty chewing/swallowing. Reports weight loss x 5 years; states  pounds. Weight loss as noted below.    Pt with T2DM; reports he takes insulin PTA. HbA1c 8.5% indicates poor blood glucose control.     Pt reports he has worked with a dietitian in the past during previous hospitalizations. Encouraged pt to follow up with outpatient MD for blood glucose monitoring and control. Pt made aware RD remains available.

## 2022-06-23 NOTE — PROGRESS NOTE ADULT - ASSESSMENT
62 YOM PMHx IDDMII, hypothyroidism, hypertension, s/p colostomy, previous issues with electrolyte disturbances inc hyponatremia, CKD III baseline sCr 2.1-2.5, presents to ED from [facility] shelter withconfusion, ostomy bag break, unable to provide history.  Found to have UTI, hyperkalemia.  Baseline renal function seems to be around sCr of 2.1-2.5.      weakness and tremor - related to overall deconditioning.  CM / SW working in partnership with his case workers at [facility] to get him into [facility] transitional housing.  He should NOT continue with gabapentin or trazodone, or other sedating medications.  He has been doing fine without here, and recommend tylenol for pain or melatonin for sleep if necessary; these should be provided as medications at discharge on his list.    HTN - increase nifed XL to 90mg daily.  x1 dose of hydralazine; chronically he should be on 1x daily medications if possible, next step would be maxing out nifed to 120mg daily.  Would avoid diuretics, ACE / ARB due to side effects for additional BP control.    CKD stage 3, baseline sCr 2.1-2.5 on review of records    hyperkalemia - improved with hydration and kayexylate.  just monitor.  no medications that affect renal function or cause hyperkalemia    UTI with GNRs - treatment completed, no further symptoms    hypothyroid - resume synthroid.  check TFTs in AM    anemia likely from CKD    vte prophylaxis    dispo - as above working on in collaboration with primary team at [facility] number [phone]  PCP visit will be scheduled for him on 7/6/22 in AM.

## 2022-06-23 NOTE — DISCHARGE NOTE PROVIDER - PROVIDER TOKENS
FREE:[LAST:[pcp],PHONE:[(   )    -],FAX:[(   )    -],ADDRESS:[PCP visit will be scheduled for him on 7/6/22 in AM.]]

## 2022-06-23 NOTE — DISCHARGE NOTE PROVIDER - HOSPITAL COURSE
62 YOM PMHx IDDMII, hypothyroidism, hypertension, s/p colostomy, previous issues with electrolyte disturbances inc hyponatremia, CKD III baseline sCr 2.1-2.5, presents to ED from [facility] shelter withconfusion, ostomy bag break, unable to provide history.  Found to have UTI, hyperkalemia.  Baseline renal function seems to be around sCr of 2.1-2.5.      weakness and tremor - related to overall deconditioning.  CM / SW working in partnership with his case workers at [facility] to get him into [facility] transitional housing.  He should NOT continue with gabapentin or trazodone, or other sedating medications.  He has been doing fine without here, and recommend tylenol for pain or melatonin for sleep if necessary; these should be provided as medications at discharge on his list.    HTN - increase nifed XL to 90mg daily.  x1 dose of hydralazine; chronically he should be on 1x daily medications if possible, next step would be maxing out nifed to 120mg daily.  Would avoid diuretics, ACE / ARB due to side effects for additional BP control.    CKD stage 3, baseline sCr 2.1-2.5 on review of records    hyperkalemia - improved with hydration and kayexylate.  just monitor.  no medications that affect renal function or cause hyperkalemia    UTI with GNRs - treatment completed, no further symptoms    hypothyroid - resume synthroid.  check TFTs in AM    anemia likely from CKD    vte prophylaxis    dispo - as above working on in collaboration with primary team at [facility] number [phone]  PCP visit will be scheduled for him on 7/6/22 in AM.

## 2022-06-23 NOTE — DISCHARGE NOTE PROVIDER - NSDCMRMEDTOKEN_GEN_ALL_CORE_FT
atorvastatin 10 mg oral tablet: 1 tab(s) orally once a day  insulin glargine 100 units/mL subcutaneous solution: 20 unit(s) subcutaneous   insulin lispro 100 units/mL injectable solution: 7 unit(s) injectable 3 times a day  levothyroxine 50 mcg (0.05 mg) oral tablet: 1 tab(s) orally once a day  NIFEdipine 90 mg oral tablet, extended release: 1 tab(s) orally once a day  pantoprazole 20 mg oral delayed release tablet: 1 tab(s) orally once a day   atorvastatin 10 mg oral tablet: 1 tab(s) orally once a day  insulin glargine 100 units/mL subcutaneous solution: 20 unit(s) subcutaneous once a day  insulin lispro 100 units/mL injectable solution: 7 unit(s) injectable 3 times a day  levothyroxine 50 mcg (0.05 mg) oral tablet: 1 tab(s) orally once a day  NIFEdipine 90 mg oral tablet, extended release: 1 tab(s) orally once a day  pantoprazole 20 mg oral delayed release tablet: 1 tab(s) orally once a day

## 2022-06-23 NOTE — DIETITIAN INITIAL EVALUATION ADULT - DIET TYPE
Add Glucerna x 1/day (provides 220 kcal, 10 g protein)/supplement (specify)/consistent carbohydrate renal with evening snacks

## 2022-06-24 VITALS — HEART RATE: 73 BPM | SYSTOLIC BLOOD PRESSURE: 147 MMHG | DIASTOLIC BLOOD PRESSURE: 68 MMHG

## 2022-06-24 LAB
CULTURE RESULTS: SIGNIFICANT CHANGE UP
CULTURE RESULTS: SIGNIFICANT CHANGE UP
GLUCOSE BLDC GLUCOMTR-MCNC: 216 MG/DL — HIGH (ref 70–99)
SPECIMEN SOURCE: SIGNIFICANT CHANGE UP
SPECIMEN SOURCE: SIGNIFICANT CHANGE UP

## 2022-06-24 RX ADMIN — Medication 650 MILLIGRAM(S): at 05:31

## 2022-06-24 RX ADMIN — INSULIN GLARGINE 20 UNIT(S): 100 INJECTION, SOLUTION SUBCUTANEOUS at 07:56

## 2022-06-24 RX ADMIN — Medication 7 UNIT(S): at 07:55

## 2022-06-24 RX ADMIN — Medication 50 MICROGRAM(S): at 05:29

## 2022-06-24 RX ADMIN — ENOXAPARIN SODIUM 40 MILLIGRAM(S): 100 INJECTION SUBCUTANEOUS at 05:28

## 2022-06-24 RX ADMIN — Medication 2: at 07:55

## 2022-06-24 RX ADMIN — Medication 90 MILLIGRAM(S): at 05:28

## 2022-06-24 NOTE — PROGRESS NOTE ADULT - PROVIDER SPECIALTY LIST ADULT
Hospitalist
Nephrology
Internal Medicine
Hospitalist

## 2022-06-24 NOTE — DISCHARGE NOTE NURSING/CASE MANAGEMENT/SOCIAL WORK - PATIENT PORTAL LINK FT
You can access the FollowMyHealth Patient Portal offered by Health system by registering at the following website: [email or web address]. By joining [facility]’s FollowMyHealth portal, you will also be able to view your health information using other applications (apps) compatible with our system.

## 2022-06-24 NOTE — PROGRESS NOTE ADULT - TIME BILLING
discussing with outside physician and plan with patient and CM / SW
discussing with patient and CM / SW
discussing with patient and CM / SW

## 2022-06-24 NOTE — PROGRESS NOTE ADULT - SUBJECTIVE AND OBJECTIVE BOX
Patient is a 62y old  Male who presents with a chief complaint of ENCOUNTER FOR OSTOMY CARE AMANDEEP GARRETT     (23 Jun 2022 15:07)      OVERNIGHT EVENTS:  Patients seen and examined at bedside this morning. No acute events overnight.    REVIEW OF SYSTEMS: denies chest pain/SOB, diaphoresis, no F/C, cough, dizziness, headache, blurry vision, nausea, vomiting, abdominal pain. All others review of systems negative     MEDICATIONS  (STANDING):  chlorhexidine 2% Cloths 1 Application(s) Topical daily  dextrose 5%. 1000 milliLiter(s) (50 mL/Hr) IV Continuous <Continuous>  dextrose 5%. 1000 milliLiter(s) (100 mL/Hr) IV Continuous <Continuous>  dextrose 50% Injectable 25 Gram(s) IV Push once  dextrose 50% Injectable 12.5 Gram(s) IV Push once  dextrose 50% Injectable 25 Gram(s) IV Push once  enoxaparin Injectable 40 milliGRAM(s) SubCutaneous every 24 hours  glucagon  Injectable 1 milliGRAM(s) IntraMuscular once  insulin glargine Injectable (LANTUS) 20 Unit(s) SubCutaneous every morning  insulin lispro (ADMELOG) corrective regimen sliding scale   SubCutaneous three times a day before meals  insulin lispro (ADMELOG) corrective regimen sliding scale   SubCutaneous at bedtime  insulin lispro Injectable (ADMELOG) 7 Unit(s) SubCutaneous three times a day before meals  levothyroxine 50 MICROGram(s) Oral daily  NIFEdipine XL 90 milliGRAM(s) Oral daily    MEDICATIONS  (PRN):  acetaminophen     Tablet .. 650 milliGRAM(s) Oral every 6 hours PRN Temp greater or equal to 38C (100.4F), Mild Pain (1 - 3)  aluminum hydroxide/magnesium hydroxide/simethicone Suspension 30 milliLiter(s) Oral every 4 hours PRN Dyspepsia  dextrose Oral Gel 15 Gram(s) Oral once PRN Blood Glucose LESS THAN 70 milliGRAM(s)/deciliter  melatonin 3 milliGRAM(s) Oral at bedtime PRN Insomnia  ondansetron Injectable 4 milliGRAM(s) IV Push every 8 hours PRN Nausea and/or Vomiting      Allergies    No Known Allergies    Intolerances        T(F): 98.4 (06-24-22 @ 04:36), Max: 98.5 (06-23-22 @ 15:55)  HR: 73 (06-24-22 @ 06:18) (73 - 98)  BP: 147/68 (06-24-22 @ 06:18) (147/68 - 172/69)  RR: 18 (06-24-22 @ 04:36) (18 - 18)  SpO2: 98% (06-24-22 @ 04:36) (98% - 98%)  Wt(kg): --    PHYSICAL EXAM:  GENERAL: NAD  HEAD:  Atraumatic, Normocephalic  EYES: PERRLA, conjunctiva and sclera clear  ENMT: Moist mucous membranes  NECK: Supple, No JVD, Normal thyroid  NERVOUS SYSTEM:  Alert & Awake  CHEST/LUNG: Clear to percussion bilaterally;   HEART: Regular rate and rhythm;   ABDOMEN: Soft, Nontender, Nondistended; Bowel sounds present  EXTREMITIES:  no edema BL LE  SKIN: moist    LABS:              Cultures;   CAPILLARY BLOOD GLUCOSE      POCT Blood Glucose.: 216 mg/dL (24 Jun 2022 07:38)  POCT Blood Glucose.: 240 mg/dL (23 Jun 2022 21:03)  POCT Blood Glucose.: 283 mg/dL (23 Jun 2022 16:06)    Lipid panel:           RADIOLOGY & ADDITIONAL TESTS:    Imaging Personally Reviewed:  [x ] YES      Consultant(s) Notes Reviewed:  [x ] YES     Care Discussed with [x ] Consultants [X ] Patient [ ] Family  [x ]    [x ]  Other; RN
[facility] NEPHROLOGY SERVICES, [facility]  NEPHROLOGY AND HYPERTENSION  [address]  [address]  [place] [address]  [phone]    MD SHOBHA HALL MD ANDREY GONCHARUK, MD MADHU KORRAPATI, MD YELENA ROSENBERG, MD BINNY KOSHY, MD CHRISTOPHER CAPUTO, MD EDWARD BOVER, MD          Patient events noted  No distress    MEDICATIONS  (STANDING):  cefTRIAXone   IVPB 1000 milliGRAM(s) IV Intermittent every 24 hours  chlorhexidine 2% Cloths 1 Application(s) Topical daily  dextrose 5%. 1000 milliLiter(s) (50 mL/Hr) IV Continuous <Continuous>  dextrose 5%. 1000 milliLiter(s) (100 mL/Hr) IV Continuous <Continuous>  dextrose 50% Injectable 25 Gram(s) IV Push once  dextrose 50% Injectable 12.5 Gram(s) IV Push once  dextrose 50% Injectable 25 Gram(s) IV Push once  enoxaparin Injectable 40 milliGRAM(s) SubCutaneous every 24 hours  glucagon  Injectable 1 milliGRAM(s) IntraMuscular once  insulin lispro (ADMELOG) corrective regimen sliding scale   SubCutaneous three times a day before meals  insulin lispro (ADMELOG) corrective regimen sliding scale   SubCutaneous at bedtime  NIFEdipine XL 60 milliGRAM(s) Oral daily    MEDICATIONS  (PRN):  acetaminophen     Tablet .. 650 milliGRAM(s) Oral every 6 hours PRN Temp greater or equal to 38C (100.4F), Mild Pain (1 - 3)  aluminum hydroxide/magnesium hydroxide/simethicone Suspension 30 milliLiter(s) Oral every 4 hours PRN Dyspepsia  dextrose Oral Gel 15 Gram(s) Oral once PRN Blood Glucose LESS THAN 70 milliGRAM(s)/deciliter  melatonin 3 milliGRAM(s) Oral at bedtime PRN Insomnia  ondansetron Injectable 4 milliGRAM(s) IV Push every 8 hours PRN Nausea and/or Vomiting      22 @ 07:01  -  22 @ 07:00  --------------------------------------------------------  IN: 2860 mL / OUT: 3350 mL / NET: -490 mL    22 @ 07:01  -  22 @ 22:37  --------------------------------------------------------  IN: 0 mL / OUT: 2150 mL / NET: -2150 mL      PHYSICAL EXAM:      T(C): 35.9 (22 @ 16:10), Max: 36.7 (22 @ 23:00)  HR: 68 (22 @ 16:10) (53 - 78)  BP: 165/77 (22 @ 16:10) (136/68 - 183/78)  RR: 18 (22 @ 16:10) (17 - 18)  SpO2: 99% (22 @ 16:10) (98% - 99%)  Wt(kg): --  Lungs clear  Heart S1S2  Abd soft NT ND  Extremities:   1 edema                                    9.4    5.72  )-----------( 156      ( 2022 08:05 )             27.2     06-20    134<L>  |  99  |  46<H>  ----------------------------<  339<H>  4.3   |  25  |  2.26<H>    Ca    9.3      2022 08:05    TPro  8.2  /  Alb  3.1<L>  /  TBili  0.2  /  DBili  x   /  AST  16  /  ALT  21  /  AlkPhos  113  06-20      LIVER FUNCTIONS - ( 2022 08:05 )  Alb: 3.1 g/dL / Pro: 8.2 gm/dL / ALK PHOS: 113 U/L / ALT: 21 U/L / AST: 16 U/L / GGT: x           Creatinine Trend: 2.26<--, 2.33<--, 2.44<--, 2.59<--    Immunoelectrophoresis, Serum (22 @ 20:56)    Quantitative Ig mg/dL    Quantitative IgA: 314 mg/dL    Quantitative IgM: 38 mg/dL    PHILOMENA Kappa: 11.69: Results verified. mg/dL    PHILOMENA Lambda: 4.39: Results verified. mg/dL    [place]/Lambda Free Light Chain Ratio, Serum: 2.66 Ratio      Assessment   CHARLES, degree of CKD unclear ;   R/O Pre/post renal factors;   Noted anemia and elevated total protein    Plan    Avoid NSAIDs  Paraprotein screen        Da Wilson MD
[facility] NEPHROLOGY SERVICES, [facility]  NEPHROLOGY AND HYPERTENSION  [address]  [address]  [place] [address]  [phone]    MD SHOBHA HALL, MD VERNON CASTELLANOS, MD MARCO KAUR, MD DUSTIN ALBRECHT, MD MARY VEGA MD          Patient events noted  More alert and coherent     MEDICATIONS  (STANDING):  cefTRIAXone   IVPB 1000 milliGRAM(s) IV Intermittent every 24 hours  enoxaparin Injectable 40 milliGRAM(s) SubCutaneous every 24 hours  NIFEdipine XL 60 milliGRAM(s) Oral daily  sodium zirconium cyclosilicate 10 Gram(s) Oral every 8 hours    MEDICATIONS  (PRN):  acetaminophen     Tablet .. 650 milliGRAM(s) Oral every 6 hours PRN Temp greater or equal to 38C (100.4F), Mild Pain (1 - 3)  aluminum hydroxide/magnesium hydroxide/simethicone Suspension 30 milliLiter(s) Oral every 4 hours PRN Dyspepsia  melatonin 3 milliGRAM(s) Oral at bedtime PRN Insomnia  ondansetron Injectable 4 milliGRAM(s) IV Push every 8 hours PRN Nausea and/or Vomiting      06-19-22 @ 07:01  -  06-19-22 @ 21:23  --------------------------------------------------------  IN: 2060 mL / OUT: 2000 mL / NET: 60 mL      PHYSICAL EXAM:      T(C): 36.7 (06-19-22 @ 16:05), Max: 36.7 (06-19-22 @ 16:05)  HR: 59 (06-19-22 @ 18:40) (47 - 71)  BP: 152/62 (06-19-22 @ 18:40) (115/54 - 211/63)  RR: 18 (06-19-22 @ 16:05) (12 - 18)  SpO2: 100% (06-19-22 @ 16:05) (95% - 100%)  Wt(kg): --  Lungs clear  Heart S1S2  Abd soft NT ND  ostomy   Extremities:   tr edema                                    8.9    6.61  )-----------( 161      ( 19 Jun 2022 15:16 )             25.2     06-19    129<L>  |  104  |  59<H>  ----------------------------<  359<H>  5.7<H>   |  17<L>  |  2.33<H>    Ca    8.3<L>      19 Jun 2022 15:16    TPro  7.8  /  Alb  2.9<L>  /  TBili  0.2  /  DBili  x   /  AST  23  /  ALT  27  /  AlkPhos  103  06-19      LIVER FUNCTIONS - ( 19 Jun 2022 15:16 )  Alb: 2.9 g/dL / Pro: 7.8 gm/dL / ALK PHOS: 103 U/L / ALT: 27 U/L / AST: 23 U/L / GGT: x           Creatinine Trend: 2.33<--, 2.44<--, 2.59<--        Assessment   CHARLES, degree of CKD unclear ;   R/O Pre/post renal factors;   Noted anemia and elevated total protein    Plan  Medical rx K  Avoid NSAIDs  IVF bicarbonate support   Paraprotein screen      Da Wilson MD
NEPHROLOGY PROGRESS NOTE    CHIEF COMPLAINT:  CKD    HPI:  Renal function stable.      ROS:  admits to HANSEN and shakiness    EXAM:  T(F): 98 (06-23-22 @ 10:51)  HR: 86 (06-23-22 @ 10:51)  BP: 150/68 (06-23-22 @ 10:51)  RR: 18 (06-23-22 @ 10:51)  SpO2: 98% (06-23-22 @ 10:51)    Conversant, in no apparent distress  Normal respiratory effort, lungs clear bilaterally  Heart RRR with no murmur, no peripheral edema         LABS              06-22    135  |  102  |  45<H>  ----------------------------<  220<H>  4.1   |  22  |  2.42<H>    Ca    9.8      22 Jun 2022 07:55  Mg     2.3     06-22      K/L 2.66    Urine eos positive         Assessment   CKD stage 3/4 @ baseline   Urine eos probably false postive  Noted anemia and elevated total protein but no clear paraproteinemia    Plan  Monitor renal indices        
[facility] NEPHROLOGY SERVICES, [facility]  NEPHROLOGY AND HYPERTENSION  [address]  [address]  [place] [address]  [phone]    MD SHOBHA HALL MD ANDREY GONCHARUK, MD MADHU KORRAPATI, MD YELENA ROSENBERG, MD BINNY KOSHY, MD CHRISTOPHER CAPUTO, MD EDWARD BOVER, MD          Patient events noted  No distress    MEDICATIONS  (STANDING):  chlorhexidine 2% Cloths 1 Application(s) Topical daily  dextrose 5%. 1000 milliLiter(s) (50 mL/Hr) IV Continuous <Continuous>  dextrose 5%. 1000 milliLiter(s) (100 mL/Hr) IV Continuous <Continuous>  dextrose 50% Injectable 25 Gram(s) IV Push once  dextrose 50% Injectable 12.5 Gram(s) IV Push once  dextrose 50% Injectable 25 Gram(s) IV Push once  enoxaparin Injectable 40 milliGRAM(s) SubCutaneous every 24 hours  glucagon  Injectable 1 milliGRAM(s) IntraMuscular once  insulin glargine Injectable (LANTUS) 14 Unit(s) SubCutaneous every morning  insulin lispro (ADMELOG) corrective regimen sliding scale   SubCutaneous three times a day before meals  insulin lispro (ADMELOG) corrective regimen sliding scale   SubCutaneous at bedtime  insulin lispro Injectable (ADMELOG) 4 Unit(s) SubCutaneous three times a day before meals  NIFEdipine XL 60 milliGRAM(s) Oral daily    MEDICATIONS  (PRN):  acetaminophen     Tablet .. 650 milliGRAM(s) Oral every 6 hours PRN Temp greater or equal to 38C (100.4F), Mild Pain (1 - 3)  aluminum hydroxide/magnesium hydroxide/simethicone Suspension 30 milliLiter(s) Oral every 4 hours PRN Dyspepsia  dextrose Oral Gel 15 Gram(s) Oral once PRN Blood Glucose LESS THAN 70 milliGRAM(s)/deciliter  melatonin 3 milliGRAM(s) Oral at bedtime PRN Insomnia  ondansetron Injectable 4 milliGRAM(s) IV Push every 8 hours PRN Nausea and/or Vomiting      06-20-22 @ 07:01  -  06-21-22 @ 07:00  --------------------------------------------------------  IN: 0 mL / OUT: 2150 mL / NET: -2150 mL      PHYSICAL EXAM:      T(C): 37.1 (06-21-22 @ 16:15), Max: 37.1 (06-20-22 @ 23:28)  HR: 63 (06-21-22 @ 17:09) (63 - 101)  BP: 180/75 (06-21-22 @ 17:09) (161/72 - 190/77)  RR: 18 (06-21-22 @ 16:15) (18 - 18)  SpO2: 97% (06-21-22 @ 16:15) (95% - 98%)  Wt(kg): --  Lungs clear  Heart S1S2  Abd soft NT ND  Extremities:   tr edema                                    9.4    5.72  )-----------( 156      ( 20 Jun 2022 08:05 )             27.2     06-21    135  |  100  |  43<H>  ----------------------------<  370<H>  4.1   |  24  |  2.59<H>    Ca    9.3      21 Jun 2022 10:40    TPro  8.2  /  Alb  3.1<L>  /  TBili  0.2  /  DBili  x   /  AST  16  /  ALT  21  /  AlkPhos  113  06-20      LIVER FUNCTIONS - ( 20 Jun 2022 08:05 )  Alb: 3.1 g/dL / Pro: 8.2 gm/dL / ALK PHOS: 113 U/L / ALT: 21 U/L / AST: 16 U/L / GGT: x           Creatinine Trend: 2.59<--, 2.26<--, 2.33<--, 2.44<--, 2.59<--      Assessment   CHARLES, degree of CKD unclear ;   R/O Pre/post renal factors;   Noted anemia and elevated total protein    Plan    Avoid NSAIDs  Paraprotein screen        Da Wilson MD
  Resting in bed NAD. Afebrile Hemodynamically stable. No acute events. more alert today. states his colostomy was done in 2004 october. denies pain f/c sob    Physical Exam: Constitutional: NAD aao x2  HEENT PERRLA EOMI  CV RRR S1S2  Pulm CTA b/l   GI soft nontender nondistended + BS ostomy bag replaced  Neuro CN II-XII grossly intact   Extremities no edema or calf tenderness    
s. history difficult from patient.  notes that he has been in and out of hospitals since march of this year.  Admitted to [facility], then to [facility], then to [facility], then to  shelter at [facility].  Seems like he has been weak, with worsening ability to care for self.  States biggest issues are tremor, unsteadiness on feet, and overall weakness.  Has been trying to get private residence through homeless services in [facility].  also noted to me that his previous doctors believed that gabapentin was a medication causing him issues, he then related to me that he did not know his current medications, and a short while later nursing reported that the patient had requested gabapentin for pain in his legs.  he also denied knowing much of his medical history but remembers he has some 'kidney problems'    o.  Vital Signs Last 24 Hrs  T(C): 36.7 (20 Jun 2022 04:55), Max: 36.7 (19 Jun 2022 16:05)  T(F): 98 (20 Jun 2022 04:55), Max: 98 (19 Jun 2022 16:05)  HR: 54 (20 Jun 2022 04:55) (53 - 62)  BP: 136/68 (20 Jun 2022 04:55) (133/54 - 176/80)  BP(mean): --  RR: 18 (20 Jun 2022 04:55) (18 - 18)  SpO2: 98% (20 Jun 2022 04:55) (95% - 100%)    gen nad  lungs clear  abd soft nt, ostomy in place  skin no breakdown noted  neuro mild resting tremor, generally looks a bit weak  psyc awake, oriented, answering questions appropriately    labs reviewed, normocytic anemia with stable h/h, Na 134, scr stable at 2.26, glucose high at 339    
s. pt notes that he still feels weak, trouble getting up and walking, tremulous.  Seems to be mismatch compared to reports from therapy services.  Still awaiting records; gets his primary care from [facility] he reports Dr. Cline.  Does not know his medications.    o.  Vital Signs Last 24 Hrs  T(C): 37.1 (21 Jun 2022 10:40), Max: 37.1 (20 Jun 2022 23:28)  T(F): 98.7 (21 Jun 2022 10:40), Max: 98.8 (20 Jun 2022 23:28)  HR: 101 (21 Jun 2022 10:40) (68 - 101)  BP: 166/71 (21 Jun 2022 10:40) (161/67 - 188/83)  BP(mean): --  RR: 18 (21 Jun 2022 10:40) (17 - 18)  SpO2: 98% (21 Jun 2022 10:40) (97% - 99%)    gen nad  abd soft nt, ostomy in place functioning well  neuro minimal resting tremor, generally looks a bit weak  psyc awake, oriented, answering questions appropriately    labs pending this morning; BG levels improved compared to yesterday with addition of SSI    
Patient is a 62y old  Male who presents with a chief complaint of AMS (22 Jun 2022 12:44)      OVERNIGHT EVENTS:  Patients seen and examined at bedside this morning. No acute events overnight.    REVIEW OF SYSTEMS: denies chest pain/SOB, diaphoresis, no F/C, cough, dizziness, headache, blurry vision, nausea, vomiting, abdominal pain. All others review of systems negative     MEDICATIONS  (STANDING):  chlorhexidine 2% Cloths 1 Application(s) Topical daily  dextrose 5%. 1000 milliLiter(s) (100 mL/Hr) IV Continuous <Continuous>  dextrose 5%. 1000 milliLiter(s) (50 mL/Hr) IV Continuous <Continuous>  dextrose 50% Injectable 25 Gram(s) IV Push once  dextrose 50% Injectable 12.5 Gram(s) IV Push once  dextrose 50% Injectable 25 Gram(s) IV Push once  enoxaparin Injectable 40 milliGRAM(s) SubCutaneous every 24 hours  glucagon  Injectable 1 milliGRAM(s) IntraMuscular once  insulin glargine Injectable (LANTUS) 20 Unit(s) SubCutaneous every morning  insulin lispro (ADMELOG) corrective regimen sliding scale   SubCutaneous three times a day before meals  insulin lispro (ADMELOG) corrective regimen sliding scale   SubCutaneous at bedtime  insulin lispro Injectable (ADMELOG) 7 Unit(s) SubCutaneous three times a day before meals  levothyroxine 50 MICROGram(s) Oral daily  NIFEdipine XL 90 milliGRAM(s) Oral daily    MEDICATIONS  (PRN):  acetaminophen     Tablet .. 650 milliGRAM(s) Oral every 6 hours PRN Temp greater or equal to 38C (100.4F), Mild Pain (1 - 3)  aluminum hydroxide/magnesium hydroxide/simethicone Suspension 30 milliLiter(s) Oral every 4 hours PRN Dyspepsia  dextrose Oral Gel 15 Gram(s) Oral once PRN Blood Glucose LESS THAN 70 milliGRAM(s)/deciliter  melatonin 3 milliGRAM(s) Oral at bedtime PRN Insomnia  ondansetron Injectable 4 milliGRAM(s) IV Push every 8 hours PRN Nausea and/or Vomiting      Allergies    No Known Allergies    Intolerances        T(F): 98 (06-23-22 @ 10:51), Max: 98.6 (06-22-22 @ 15:55)  HR: 86 (06-23-22 @ 10:51) (74 - 100)  BP: 150/68 (06-23-22 @ 10:51) (126/71 - 161/76)  RR: 18 (06-23-22 @ 10:51) (18 - 18)  SpO2: 98% (06-23-22 @ 10:51) (97% - 98%)  Wt(kg): --    PHYSICAL EXAM:  GENERAL: NAD  HEAD:  Atraumatic, Normocephalic  EYES: PERRLA, conjunctiva and sclera clear  ENMT: Moist mucous membranes  NECK: Supple, No JVD, Normal thyroid  NERVOUS SYSTEM:  Alert & Awake  CHEST/LUNG: Clear to percussion bilaterally;   HEART: Regular rate and rhythm;   ABDOMEN: Soft, Nontender, Nondistended; Bowel sounds present  EXTREMITIES:  no edema BL LE  SKIN: moist    LABS:    06-22    135  |  102  |  45<H>  ----------------------------<  220<H>  4.1   |  22  |  2.42<H>    Ca    9.8      22 Jun 2022 07:55  Mg     2.3     06-22          Cultures;   CAPILLARY BLOOD GLUCOSE      POCT Blood Glucose.: 290 mg/dL (23 Jun 2022 10:59)  POCT Blood Glucose.: 196 mg/dL (23 Jun 2022 07:26)  POCT Blood Glucose.: 287 mg/dL (22 Jun 2022 20:55)  POCT Blood Glucose.: 248 mg/dL (22 Jun 2022 16:55)    Lipid panel:           RADIOLOGY & ADDITIONAL TESTS:    Imaging Personally Reviewed:  [x ] YES      Consultant(s) Notes Reviewed:  [x ] YES     Care Discussed with [x ] Consultants [X ] Patient [ ] Family  [x ]    [x ]  Other; RN
s. notes weakness still present.  I watched him walk today, he is able to ambulate short distances with a walker, but certainly not able to carry out the activities he would need to manage his medical care (public transportation etc).  He does intermittently have some trouble with his ostomy when things are not going well, but under normal circumstances he can change bag etc.  his weakness seems to be related to overall deconditioning.  I was able to speak with his PCP Dr. Mau Cline whom follows the patient in a specialty primary care clinic through [facility] that manages complex homeless patients.  Records were able to be forawrded, we have some idea now of his outpatient regimen (? gabapentin and trazodone at night, insulin basal bolus, atorvastatin, synthroid, protonix, nifed XL), and his medical issues have been renal in nature, with electrolyte disturbances, along with issues with high ostomy outputs, and a bout of cdiff earlier in the year.    o.  Vital Signs Last 24 Hrs  T(C): 36.9 (22 Jun 2022 11:30), Max: 37.1 (21 Jun 2022 16:15)  T(F): 98.5 (22 Jun 2022 11:30), Max: 98.7 (21 Jun 2022 16:15)  HR: 87 (22 Jun 2022 11:30) (63 - 94)  BP: 180/79 (22 Jun 2022 11:30) (161/72 - 190/77)  BP(mean): --  RR: 18 (22 Jun 2022 11:30) (18 - 18)  SpO2: 99% (22 Jun 2022 11:30) (95% - 99%)    gen nad  abd soft nt, ostomy in place functioning well  neuro / msk: gait slow but able to manage with the walker.  some general weakness but no focal deficits.  psyc awake, oriented, answering questions appropriately    labs chem stable, sCr seems to be at baseline, BGs a little better but still high

## 2022-06-24 NOTE — PROGRESS NOTE ADULT - NUTRITIONAL ASSESSMENT
This patient has been assessed with a concern for Malnutrition and has been determined to have a diagnosis/diagnoses of Moderate protein-calorie malnutrition.    This patient is being managed with:   Diet Consistent Carbohydrate Renal w/Evening Snack-  Supplement Feeding Modality:  Oral  Glucerna Shake Cans or Servings Per Day:  1       Frequency:  Daily  Entered: Jun 23 2022  3:43PM    Diet Consistent Carbohydrate Renal w/Evening Snack-  No Concentrated Potassium  Entered: Jun 21 2022  7:56AM    The following pending diet order is being considered for treatment of Moderate protein-calorie malnutrition:null

## 2022-06-24 NOTE — DISCHARGE NOTE NURSING/CASE MANAGEMENT/SOCIAL WORK - NSDCPEFALRISK_GEN_ALL_CORE
For information on Fall & Injury Prevention, visit: [email or web address] OR  [email or web address] OR  https://www.cdc.gov/steadi/patient.html

## 2022-06-24 NOTE — PROGRESS NOTE ADULT - ASSESSMENT
62 YOM PMHx IDDMII, hypothyroidism, hypertension, s/p colostomy, previous issues with electrolyte disturbances inc hyponatremia, CKD III baseline sCr 2.1-2.5, presents to ED from [facility] shelter withconfusion, ostomy bag break, unable to provide history.  Found to have UTI, hyperkalemia.  Baseline renal function seems to be around sCr of 2.1-2.5.      weakness and tremor - related to overall deconditioning.  CM / SW working in partnership with his case workers at [facility] to get him into [facility] transitional housing.  He should NOT continue with gabapentin or trazodone, or other sedating medications.  He has been doing fine without here, and recommend tylenol for pain or melatonin for sleep if necessary; these should be provided as medications at discharge on his list.    HTN - increase nifed XL to 90mg daily.  x1 dose of hydralazine; chronically he should be on 1x daily medications if possible, next step would be maxing out nifed to 120mg daily.  Would avoid diuretics, ACE / ARB due to side effects for additional BP control.    CKD stage 3, baseline sCr 2.1-2.5 on review of records    hyperkalemia - improved with hydration and kayexylate.  just monitor.  no medications that affect renal function or cause hyperkalemia    UTI with GNRs - treatment completed, no further symptoms    hypothyroid - resume synthroid.  check TFTs in AM    anemia likely from CKD    vte prophylaxis    dispo - as above working on in collaboration with primary team at [facility] clinic number [phone]  PCP visit will be scheduled for him on 7/6/22 in AM.

## 2022-06-25 LAB — M PROTEIN 24H UR ELPH-MRATE: PRESENT

## 2022-06-29 DIAGNOSIS — R41.82 ALTERED MENTAL STATUS, UNSPECIFIED: ICD-10-CM

## 2022-06-29 DIAGNOSIS — D63.8 ANEMIA IN OTHER CHRONIC DISEASES CLASSIFIED ELSEWHERE: ICD-10-CM

## 2022-06-29 DIAGNOSIS — G93.41 METABOLIC ENCEPHALOPATHY: ICD-10-CM

## 2022-06-29 DIAGNOSIS — R65.20 SEVERE SEPSIS WITHOUT SEPTIC SHOCK: ICD-10-CM

## 2022-06-29 DIAGNOSIS — N17.9 ACUTE KIDNEY FAILURE, UNSPECIFIED: ICD-10-CM

## 2022-06-29 DIAGNOSIS — E44.0 MODERATE PROTEIN-CALORIE MALNUTRITION: ICD-10-CM

## 2022-06-29 DIAGNOSIS — E03.9 HYPOTHYROIDISM, UNSPECIFIED: ICD-10-CM

## 2022-06-29 DIAGNOSIS — E87.5 HYPERKALEMIA: ICD-10-CM

## 2022-06-29 DIAGNOSIS — I12.9 HYPERTENSIVE CHRONIC KIDNEY DISEASE WITH STAGE 1 THROUGH STAGE 4 CHRONIC KIDNEY DISEASE, OR UNSPECIFIED CHRONIC KIDNEY DISEASE: ICD-10-CM

## 2022-06-29 DIAGNOSIS — N18.30 CHRONIC KIDNEY DISEASE, STAGE 3 UNSPECIFIED: ICD-10-CM

## 2022-06-29 DIAGNOSIS — Z79.899 OTHER LONG TERM (CURRENT) DRUG THERAPY: ICD-10-CM

## 2022-06-29 DIAGNOSIS — Z93.3 COLOSTOMY STATUS: ICD-10-CM

## 2022-06-29 DIAGNOSIS — A41.50 GRAM-NEGATIVE SEPSIS, UNSPECIFIED: ICD-10-CM

## 2022-06-29 DIAGNOSIS — E87.1 HYPO-OSMOLALITY AND HYPONATREMIA: ICD-10-CM

## 2022-06-29 DIAGNOSIS — E11.9 TYPE 2 DIABETES MELLITUS WITHOUT COMPLICATIONS: ICD-10-CM

## 2022-06-29 DIAGNOSIS — Z79.890 HORMONE REPLACEMENT THERAPY: ICD-10-CM

## 2022-09-09 NOTE — PROVIDER CONTACT NOTE (CRITICAL VALUE NOTIFICATION) - DATE AND TIME:
2 PAGE(S)  TO/FROM Griselda Pichardo RN MSN [facility]  WITH Weekly BP Checks   Reason for fax:    [x] Received   [] Given   [] Faxed   [] Mailed 09/09/22 for provider [] Dr. Ledezma    [] Dr. Bull   [] Dr. Coyne   [] Lolis Carbone NP   [x] [facility] Provider  Lore Jefferson    Does parent need a copy of form, or do they want it mailed, picked up or faxed? Fax #:     []  PLACED IN PROVIDERS FOLDER AT CHECK IN 3  []  PLACED IN PROVIDERS BASKET  [x]  PLACED IN [facility] BASKET  []  PLACED IN REFERRAL SPECIALIST BASKET   []  OTHER -    28-Jul-2021 05:33

## 2022-10-07 NOTE — PROVIDER CONTACT NOTE (CRITICAL VALUE NOTIFICATION) - TEST AND RESULT REPORTED:
"Shift: 3014-7618    BP (!) 170/101 (BP Location: Right arm, Patient Position: Sitting)   Pulse 96   Temp 98.3  F (36.8  C) (Oral)   Resp 16   Ht 1.778 m (5' 10\")   Wt 61.2 kg (134 lb 14.4 oz)   SpO2 99%   BMI 19.36 kg/m       VS:/101 on room air. Patient denies pain. Patient was nausea around 0200. He vomited about 30 ml clear emesis. Zofran offered, pt did not want it. Urine Output - one unmeasured small void. No bowel movement this shift. Pt gets up with one assist and a walker. Left PIV SL, right HD, Left arm fistula. Pt refused cardiac monitoring.Slept most of the night.Refused sub q heparin injection.  Plan: Eye clinic appointment today at 0930 and dialysis today.   Plan of Care Reviewed With: patient         " hemoglobin 6.3, hct 19.4

## 2022-11-11 NOTE — PATIENT PROFILE ADULT - NAME OF PERSON WHO ASSISTED
Observation Goals:     - Nausea/vomiting (diarrhea if present) improved: Not met, continued diarrhea  - Tolerating oral intake to maintain hydration: Met  - Vital signs normal or at patient baseline and orthostatic vitals are normal and patient not lightheaded with standing: Met  - Diagnostic tests and consults completed and resulted if ordered: Not met  - Adequate pain control on oral analgesia: Not met  - Tolerating oral antibiotics if ordered: N/A  - Safe disposition plan has been identified: Met   Nicko urban

## 2022-11-16 NOTE — ED ADULT TRIAGE NOTE - TEMPERATURE IN CELSIUS (DEGREES C)
Analia is a 32 Visit Details    Video Start Time: 10:42 AM    Type of service:  Video Visit    Video End Time:10:48 AM    Originating Location (pt. Location): Home        Distant Location (provider location):  Off-site    Platform used for Video Visit: [facility]    Headache Neurology Progress Note  November 16, 2022    Subjective:    Analia Jerry returns for follow up of chronic migraine without aura, history of lumbar puncture headache.  At her last visit, I recommended a trial of topiramate for headache prevention and sumatriptan and metoclopramide for rescue.    Today, she reports that she is doing better. She reports that she is no longer having frequent headaches since starting topiramate. She is using her rescue medication rarely, only needing it a handful of times over the last 3 to 4 months.    She initially had word finding difficulty when she started topiramate.  This improved significantly after a week.  She would choose to continue taking it despite this side effect.    Sumatriptan and metoclopramide remain effective.  She typically takes 1 of each to treat an attack.    Objective:    Vitals: There were no vitals taken for this visit.  General: Cooperative, NAD  Neurologic:  Mental Status: Fully alert, attentive and oriented. Speech clear and fluent.   Cranial Nerves: Facial movements symmetric.   Motor: No abnormal movements.      Assessment/Plan:   Analia Jerry is a 32 year old who returns for follow-up of chronic migraine without aura and history of lumbar puncture headache.    She has had significant benefit with topiramate for migraine prevention.  We discussed continuing this at 100 mg  nightly.  I will change her prescription to the 100 mg tablet for convenience.     We reviewed her symptomatic treatment strategy:  -For acute treatment of mild headache, she will continue ibuprofen as needed, not to exceed more than 14 days/month to avoid medication overuse.  - For acute treatment of moderate to severe headache, I recommend sumatriptan 100 mg taken at the onset of headache, with a repeat dose in 2 hours if needed.  This should not exceed more than 9 days/month to avoid medication overuse.  - For headache related nausea, I recommend metoclopramide 10 mg as needed.     Her headache frequency and severity warrant prevention.    -I recommend she continue topiramate 100 mg nightly.  -If topiramate is not tolerated or not effective in the future, atenolol, botulinum toxin injections using a chronic migraine protocol every 12 weeks, or a CGRP inhibitor could be considered.  With her currently changing antidepressant regimen, we will avoid adding a tricyclic antidepressant; this may also be contraindicated due to her elevated pulse rate.    I will plan to see her back in 1 year, or sooner if needed.    Karissa Gonzalez MD  Neurology      36.5
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
## 2022-12-15 NOTE — PATIENT PROFILE ADULT - FUNCTIONAL ASSESSMENT - DAILY ACTIVITY 2.
Quality 47: Advance Care Plan: Advance care planning not documented, reason not otherwise specified. Quality 111:Pneumonia Vaccination Status For Older Adults: Pneumococcal vaccine (PPSV23) administered on or after patient’s 60th birthday and before the end of the measurement period Quality 226: Preventive Care And Screening: Tobacco Use: Screening And Cessation Intervention: Patient screened for tobacco use and is an ex/non-smoker Quality 130: Documentation Of Current Medications In The Medical Record: Current Medications Documented Quality 128: Preventive Care And Screening: Body Mass Index (Bmi) Screening And Follow-Up Plan: BMI not documented, reason not otherwise specified. Quality 110: Preventive Care And Screening: Influenza Immunization: Influenza Immunization Administered during Influenza season Quality 431: Preventive Care And Screening: Unhealthy Alcohol Use - Screening: Patient screened for unhealthy alcohol use using a single question and scores less than 2 times per year Quality 431: Preventive Care And Screening: Unhealthy Alcohol Use - Screening: Patient not identified as an unhealthy alcohol user when screened for unhealthy alcohol use using a systematic screening method Detail Level: Detailed 2 = A lot of assistance

## 2023-05-11 NOTE — BEHAVIORAL HEALTH ASSESSMENT NOTE - NS ED BHA DEMOGRAPHICS MEDICAL RECORD REVIEWED CONSENT OBTAINED YN
Patient states that he is having labs done on 5/12/23.    Patient would like to have a testosterone added to these labs.     If there are any questions the patient can reached at:[phone]  
Spoke to patient who stated he no longer has a PCP and wanted to see where his Testosterone level is now. Patient stated he is be okay with waiting and taking to Dr. Cordova regarding concerns.     No further questions at this time   
No

## 2023-06-08 ENCOUNTER — EMERGENCY (EMERGENCY)
Facility: HOSPITAL | Age: 63
LOS: 0 days | Discharge: HOME | End: 2023-06-09
Attending: STUDENT IN AN ORGANIZED HEALTH CARE EDUCATION/TRAINING PROGRAM
Payer: MEDICAID

## 2023-06-08 VITALS
TEMPERATURE: 98 F | WEIGHT: 179.9 LBS | HEIGHT: 66 IN | OXYGEN SATURATION: 98 % | HEART RATE: 59 BPM | RESPIRATION RATE: 17 BRPM | DIASTOLIC BLOOD PRESSURE: 97 MMHG | SYSTOLIC BLOOD PRESSURE: 160 MMHG

## 2023-06-08 DIAGNOSIS — Z79.4 LONG TERM (CURRENT) USE OF INSULIN: ICD-10-CM

## 2023-06-08 DIAGNOSIS — R47.81 SLURRED SPEECH: ICD-10-CM

## 2023-06-08 DIAGNOSIS — I10 ESSENTIAL (PRIMARY) HYPERTENSION: ICD-10-CM

## 2023-06-08 DIAGNOSIS — F10.129 ALCOHOL ABUSE WITH INTOXICATION, UNSPECIFIED: ICD-10-CM

## 2023-06-08 DIAGNOSIS — E11.9 TYPE 2 DIABETES MELLITUS WITHOUT COMPLICATIONS: ICD-10-CM

## 2023-06-08 DIAGNOSIS — R11.0 NAUSEA: ICD-10-CM

## 2023-06-08 DIAGNOSIS — R41.82 ALTERED MENTAL STATUS, UNSPECIFIED: ICD-10-CM

## 2023-06-08 DIAGNOSIS — Z93.3 COLOSTOMY STATUS: ICD-10-CM

## 2023-06-08 RX ORDER — SODIUM CHLORIDE 9 MG/ML
1000 INJECTION INTRAMUSCULAR; INTRAVENOUS; SUBCUTANEOUS ONCE
Refills: 0 | Status: COMPLETED | OUTPATIENT
Start: 2023-06-08 | End: 2023-06-08

## 2023-06-08 RX ORDER — ONDANSETRON 8 MG/1
4 TABLET, FILM COATED ORAL ONCE
Refills: 0 | Status: COMPLETED | OUTPATIENT
Start: 2023-06-08 | End: 2023-06-08

## 2023-06-08 NOTE — ED ADULT TRIAGE NOTE - ESI TRIAGE ACUITY LEVEL, MLM
2 Occupational Therapy    Visit Type: treatment  Precautions:  Medical precautions:  fall risk; standard precautions.      87 year old admitted with generalized weakness, UTI, pneumonia. PMH includes:  diastolic heart failure, chronic kidney disease stage 3, anxiety, anemia of chronic disease, type 2 diabetes mellitus, chronic hypoxic respiratory failure and COPD.  Lines:     Basic: O2 and capped IV (2L)      Lines in chart and on patient reviewed, precautions maintained throughout session.  Safety Measures: bed alarm and chair alarm  SUBJECTIVE  Patient agreed to participate in therapy this date.  Patient with daughters upon entry. State they are able to assist as needed as well as granddaughters.      OBJECTIVE     Cognitive Status   Level of Consciousness   - alert  Orientation    - Oriented to: person, place and time  Functional Communication   - Overall Status: within functional limits    Patient Activity Tolerance: 1 to 1 activity to rest       Sitting Balance  (GENEVA = base of support)  Static      - Trial 1 details: modified independent    Standing Balance  (GENEVA = base of support)  Firm Surface: Double Leg      - Static, Eyes Open       - Trial 1 details: modified independent     - Dynamic, Eyes Open       - Trial 1 details: modified independent       Transfers  Assistive devices: 4-wheeled walker, gait belt  - Sit to stand: modified independent, with verbal cues       - Second Trial: modified independent  - Stand to sit: modified independent       - Second Trial: modified independent      Functional Ambulation  - Assistance: modified independent and supervision  - Assistive device: 4-wheeled walker and gait belt  - Distance (ft):100; 20  - Surface: even  Activities of Daily Living (ADLs)  Lower Body Dressing:   - Footwear:       - Assistance: independent and minimal assist       - Type: socks and slip-on shoe  Due to high chair height patient required assist for back of shoe, able to doff socks ind. Educated on  basic fall prevention and energy conservation techniques and educated family.              ASSESSMENT  Patient seen on [address] [place] [address].     Today's treatment session focused on activity tolerance and discharge teach. Patient able to progress mobility and educated on energy conservation for slow progression of safe mobility at home. Family present and educated on possible assist that will be required upon discharge. Appears patient is at baseline status at this time. Mod I to supervision for increased mobility and min assist for shoes (pt typically does not wear at home or gets assist for when leaving apartment). Due to baseline activity and assist as needed from daughters recommend home with HHOT. The patient now presents with impairments in activity tolerance and strength.      Skilled OT indicated to address the above deficits.            Discharge Recommendations:  Recommendation for Discharge Location: OT WI: Home with Home therapy, Pending functional progress  Recommendation for Discharge Support: OT WI: Intermittent assist daily  PT/OT Mobility Equipment for Discharge: has 2ww/4ww  PT/OT ADL Equipment for Discharge: has shower chair  OT Identified Barriers to Discharge: functional progress        Progress: improving as expected and progressing toward goals    • Skilled therapy is required to address these limitations in attempt to maximize the patient's independence.    Patient at End of Session:   Handoff to: therapist    PLAN  Suggestions for next session as indicated: Progress mobility, transfers    OT Frequency: 6-7 x per week  Frequency Comments: AA - 1/6-7 by 10/12 (10/5)          Documented in the chart in the following areas: Assessment. Plan.      Therapy procedure time and total treatment time can be found documented on the Time Entry flowsheet

## 2023-06-08 NOTE — ED ADULT TRIAGE NOTE - CHIEF COMPLAINT QUOTE
BIBA, per EMS, pt intoxicated from shelter, vomited x 1 in ambulance. BIBA, per EMS, pt intoxicated from shelter, vomited x 1 in ambulance.  (pt has walker)

## 2023-06-09 ENCOUNTER — EMERGENCY (EMERGENCY)
Facility: HOSPITAL | Age: 63
LOS: 0 days | Discharge: HOME | End: 2023-06-10
Attending: STUDENT IN AN ORGANIZED HEALTH CARE EDUCATION/TRAINING PROGRAM
Payer: MEDICAID

## 2023-06-09 VITALS
SYSTOLIC BLOOD PRESSURE: 148 MMHG | RESPIRATION RATE: 14 BRPM | TEMPERATURE: 98 F | OXYGEN SATURATION: 98 % | DIASTOLIC BLOOD PRESSURE: 77 MMHG | WEIGHT: 169.98 LBS | HEIGHT: 66 IN | HEART RATE: 79 BPM

## 2023-06-09 VITALS
HEART RATE: 73 BPM | DIASTOLIC BLOOD PRESSURE: 72 MMHG | TEMPERATURE: 97 F | SYSTOLIC BLOOD PRESSURE: 137 MMHG | RESPIRATION RATE: 18 BRPM | OXYGEN SATURATION: 98 %
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
## 2023-06-09 DIAGNOSIS — S09.90XA UNSPECIFIED INJURY OF HEAD, INITIAL ENCOUNTER: ICD-10-CM

## 2023-06-09 DIAGNOSIS — Z23 ENCOUNTER FOR IMMUNIZATION: ICD-10-CM
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
## 2023-06-09 DIAGNOSIS — S02.2XXA FRACTURE OF NASAL BONES, INITIAL ENCOUNTER FOR CLOSED FRACTURE: ICD-10-CM

## 2023-06-09 DIAGNOSIS — F10.129 ALCOHOL ABUSE WITH INTOXICATION, UNSPECIFIED: ICD-10-CM

## 2023-06-09 DIAGNOSIS — Z79.4 LONG TERM (CURRENT) USE OF INSULIN: ICD-10-CM

## 2023-06-09 PROBLEM — Z93.3 COLOSTOMY STATUS: Chronic | Status: ACTIVE | Noted: 2022-06-23

## 2023-06-09 PROBLEM — E11.9 TYPE 2 DIABETES MELLITUS WITHOUT COMPLICATIONS: Chronic | Status: ACTIVE | Noted: 2022-06-23

## 2023-06-09 PROBLEM — I10 ESSENTIAL (PRIMARY) HYPERTENSION: Chronic | Status: ACTIVE | Noted: 2022-06-23

## 2023-06-09 LAB
ALBUMIN SERPL ELPH-MCNC: 4.2 G/DL — SIGNIFICANT CHANGE UP (ref 3.3–5)
ALP SERPL-CCNC: 124 U/L — HIGH (ref 40–120)
ALT FLD-CCNC: 36 U/L — SIGNIFICANT CHANGE UP (ref 12–78)
ANION GAP SERPL CALC-SCNC: 11 MMOL/L — SIGNIFICANT CHANGE UP (ref 5–17)
AST SERPL-CCNC: 23 U/L — SIGNIFICANT CHANGE UP (ref 15–37)
BASOPHILS # BLD AUTO: 0.03 K/UL — SIGNIFICANT CHANGE UP (ref 0–0.2)
BASOPHILS NFR BLD AUTO: 0.5 % — SIGNIFICANT CHANGE UP (ref 0–2)
BILIRUB SERPL-MCNC: 0.3 MG/DL — SIGNIFICANT CHANGE UP (ref 0.2–1.2)
BUN SERPL-MCNC: 33 MG/DL — HIGH (ref 7–23)
CALCIUM SERPL-MCNC: 8.3 MG/DL — LOW (ref 8.5–10.1)
CHLORIDE SERPL-SCNC: 105 MMOL/L — SIGNIFICANT CHANGE UP (ref 96–108)
CO2 SERPL-SCNC: 18 MMOL/L — LOW (ref 22–31)
CREAT SERPL-MCNC: 2.98 MG/DL — HIGH (ref 0.5–1.3)
EGFR: 23 ML/MIN/1.73M2 — LOW
EOSINOPHIL # BLD AUTO: 0.13 K/UL — SIGNIFICANT CHANGE UP (ref 0–0.5)
EOSINOPHIL NFR BLD AUTO: 2.3 % — SIGNIFICANT CHANGE UP (ref 0–6)
GLUCOSE SERPL-MCNC: 289 MG/DL — HIGH (ref 70–99)
HCT VFR BLD CALC: 28.1 % — LOW (ref 39–50)
HGB BLD-MCNC: 9.5 G/DL — LOW (ref 13–17)
IMM GRANULOCYTES NFR BLD AUTO: 0.5 % — SIGNIFICANT CHANGE UP (ref 0–0.9)
LIDOCAIN IGE QN: 576 U/L — HIGH (ref 73–393)
LYMPHOCYTES # BLD AUTO: 1.87 K/UL — SIGNIFICANT CHANGE UP (ref 1–3.3)
LYMPHOCYTES # BLD AUTO: 33.5 % — SIGNIFICANT CHANGE UP (ref 13–44)
MAGNESIUM SERPL-MCNC: 2.3 MG/DL — SIGNIFICANT CHANGE UP (ref 1.6–2.6)
MCHC RBC-ENTMCNC: 32.4 PG — SIGNIFICANT CHANGE UP (ref 27–34)
MCHC RBC-ENTMCNC: 33.8 G/DL — SIGNIFICANT CHANGE UP (ref 32–36)
MCV RBC AUTO: 95.9 FL — SIGNIFICANT CHANGE UP (ref 80–100)
MONOCYTES # BLD AUTO: 0.45 K/UL — SIGNIFICANT CHANGE UP (ref 0–0.9)
MONOCYTES NFR BLD AUTO: 8.1 % — SIGNIFICANT CHANGE UP (ref 2–14)
NEUTROPHILS # BLD AUTO: 3.08 K/UL — SIGNIFICANT CHANGE UP (ref 1.8–7.4)
NEUTROPHILS NFR BLD AUTO: 55.1 % — SIGNIFICANT CHANGE UP (ref 43–77)
NRBC # BLD: 0 /100 WBCS — SIGNIFICANT CHANGE UP (ref 0–0)
PHOSPHATE SERPL-MCNC: 2.8 MG/DL — SIGNIFICANT CHANGE UP (ref 2.5–4.5)
PLATELET # BLD AUTO: 155 K/UL — SIGNIFICANT CHANGE UP (ref 150–400)
POTASSIUM SERPL-MCNC: 4.1 MMOL/L — SIGNIFICANT CHANGE UP (ref 3.5–5.3)
POTASSIUM SERPL-SCNC: 4.1 MMOL/L — SIGNIFICANT CHANGE UP (ref 3.5–5.3)
PROT SERPL-MCNC: 8.5 GM/DL — HIGH (ref 6–8.3)
RBC # BLD: 2.93 M/UL — LOW (ref 4.2–5.8)
RBC # FLD: 14.7 % — HIGH (ref 10.3–14.5)
SODIUM SERPL-SCNC: 134 MMOL/L — LOW (ref 135–145)
WBC # BLD: 5.59 K/UL — SIGNIFICANT CHANGE UP (ref 3.8–10.5)
WBC # FLD AUTO: 5.59 K/UL — SIGNIFICANT CHANGE UP (ref 3.8–10.5)

## 2023-06-09 RX ORDER — ATORVASTATIN CALCIUM 80 MG/1
1 TABLET, FILM COATED ORAL
Qty: 0 | Refills: 0 | DISCHARGE

## 2023-06-09 RX ORDER — TETANUS TOXOID, REDUCED DIPHTHERIA TOXOID AND ACELLULAR PERTUSSIS VACCINE, ADSORBED 5; 2.5; 8; 8; 2.5 [IU]/.5ML; [IU]/.5ML; UG/.5ML; UG/.5ML; UG/.5ML
0.5 SUSPENSION INTRAMUSCULAR ONCE
Refills: 0 | Status: COMPLETED | OUTPATIENT
Start: 2023-06-09 | End: 2023-06-09

## 2023-06-09 RX ADMIN — ONDANSETRON 4 MILLIGRAM(S): 8 TABLET, FILM COATED ORAL at 00:09

## 2023-06-09 RX ADMIN — TETANUS TOXOID, REDUCED DIPHTHERIA TOXOID AND ACELLULAR PERTUSSIS VACCINE, ADSORBED 0.5 MILLILITER(S): 5; 2.5; 8; 8; 2.5 SUSPENSION INTRAMUSCULAR at 19:08

## 2023-06-09 RX ADMIN — SODIUM CHLORIDE 2000 MILLILITER(S): 9 INJECTION INTRAMUSCULAR; INTRAVENOUS; SUBCUTANEOUS at 00:05

## 2023-06-09 NOTE — ED PROVIDER NOTE - NSDCPRINTRESULTS_ED_ALL_ED
Patient requests all Lab, Cardiology, and Radiology Results on their Discharge Instructions normal rate and rhythm, no chest pain and no edema.

## 2023-06-09 NOTE — ED PROVIDER NOTE - CLINICAL SUMMARY MEDICAL DECISION MAKING FREE TEXT BOX
presents with altered mental status, known ETOH user.  +Slurred, sluggish behavior.  likely EtOH intoxication. Airway maintained. Unlikely intracranial bleed, opioid intoxication or coingestion, sepsis, hypothyroidism.  Suspect likely transient course of intoxication with expected  improvement of symptoms as patient metabolizes offending agent.    Workup: POCT glucose, frequent reassessments , zofran, basic labs
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
## 2023-06-09 NOTE — ED PROVIDER NOTE - PROGRESS NOTE DETAILS
FS elevated here, however came down on its own and pt not in DKA    The patient is clinically sober. The patient is alert and oriented x 3, is clear and coherent in conversation and has a normal gait and shows no signs of acute intoxication. The patient is safe for discharge.

## 2023-06-09 NOTE — ED PROVIDER NOTE - PATIENT PORTAL LINK FT
You can access the FollowMyHealth Patient Portal offered by [facility] by registering at the following website: [email or web address]. By joining [facility]’s FollowMyHealth portal, you will also be able to view your health information using other applications (apps) compatible with our system.

## 2023-06-09 NOTE — ED PROVIDER NOTE - OBJECTIVE STATEMENT
63-year-old male with past ministry of diabetes hypertension presenting with an tox.  Patient states he drank beer earlier today, was brought in by EMS with emesis.  Denies abdominal pain chest pain shortness of breath, denies any other medical complaints.

## 2023-06-09 NOTE — ED ADULT NURSE NOTE - NSFALLHARMRISKINTERV_ED_ALL_ED

## 2023-06-09 NOTE — ED ADULT NURSE NOTE - MODE OF DISCHARGE
[facility] Pulmonary Services  [address] [facility] [address]  Phone: [phone]  Fax: [phone]                  Noble Tripp   2017 9:20 AM   Office Visit    Description:  Male : 1946   Provider:  Santos Cardozo MD   Department:  [facility]           Reason for Visit     COPD           Diagnoses this Visit        Comments    Moderate COPD (chronic obstructive pulmonary disease)    -  Primary     JUANITO on CPAP     Needs to come back CPAP titration           To Do List           Future Appointments        Provider Department Dept Phone    2017 8:00 AM Joselin Castellon PA-C [facility] - Interventional Pain [phone]    2017 10:10 AM LABORATORY, [address] [facility] [phone]    3/6/2017 9:00 AM Sue Cross MD [facility] - Hematology Oncology [phone]    3/6/2017 9:40 AM Andres Whitfield MD [facility] - Cardiology [phone]    11/3/2017 8:40 AM Dwaine Davis MD [facility] - Internal Medicine [phone]      Goals (5 Years of Data)     None      Follow-Up and Disposition     Return in about 1 year (around 2018) for cxr, dali, CPAP titration.       These Medications        Disp Refills Start End    albuterol 90 mcg/actuation inhaler 1 Inhaler 11 2017     Inhale 2 puffs into the lungs every 4 (four) hours as needed for Wheezing or Shortness of Breath. - Inhalation    Pharmacy: [facility] [address] [place] [address] [address] [facility] Ph #: [phone]         [facility]     [facility] Nurse Care Line -  Assistance  Registered nurses in the [facility] provide clinical advisement, health education, appointment booking, and other advisory services.  Call for this free service at [phone].             Medications           Message regarding Medications     Verify the changes and/or additions to your medication regime  "listed below are the same as discussed with your clinician today.  If any of these changes or additions are incorrect, please notify your healthcare provider.        STOP taking these medications     TUDORZA PRESSAIR 400 mcg/actuation AePB INHALE 1 PUFF INTO THE LUNGS 2 TIMES DAILY.           Verify that the below list of medications is an accurate representation of the medications you are currently taking.  If none reported, the list may be blank. If incorrect, please contact your healthcare provider. Carry this list with you in case of emergency.           Current Medications     albuterol 90 mcg/actuation inhaler Inhale 2 puffs into the lungs every 4 (four) hours as needed for Wheezing or Shortness of Breath.    aspirin 81 mg Tab Take 1 tablet by mouth Daily. Over the counter to help prevent stroke/heart attack    CHANTIX CONTINUING MONTH BOX 1 mg Tab TAKE 1 TABLET (1 MG TOTAL) BY MOUTH ONCE DAILY.    ferrous sulfate 325 (65 FE) MG EC tablet Take 1 tablet (325 mg total) by mouth 2 times daily 2 hours after meal.    fish oil-omega-3 fatty acids 300-1,000 mg capsule Take 2 g by mouth.    gabapentin (NEURONTIN) 800 MG tablet Take 1 tablet (800 mg total) by mouth 3 (three) times daily.    hydrocodone-acetaminophen 10-325mg (NORCO)  mg Tab Take 1 tablet by mouth 3 (three) times daily as needed.    loratadine (CLARITIN) 10 mg tablet Take 1 tablet by mouth Daily.    multivitamin capsule Take 1 capsule by mouth once daily.    pantoprazole (PROTONIX) 40 MG tablet Take 40 mg by mouth once daily.     pilocarpine (SALAGEN) 5 MG Tab TAKE 1/2 HOUR PRIOR TO EACH MEAL    REPATHA SURECLICK 140 mg/mL PnIj INJECT 140 MG INTO THE SKIN EVERY 14 DAYS           Clinical Reference Information           Your Vitals Were     BP Pulse Resp Height Weight SpO2    124/56 65 18 5' 8" (1.727 m) 72 kg (158 lb 11.2 oz) 96%    BMI                24.13 kg/m2          Blood Pressure          Most Recent Value    BP  (!)  124/56      Allergies " as of 2/21/2017     Contrast Media    Iodinated Contrast Media - Iv Dye    Neomycin-bacitracin-polymyxin    Pravastatin    Rosuvastatin    Simvastatin    Statins-hmg-coa Reductase Inhibitors      Immunizations Administered on Date of Encounter - 2/21/2017     None      Orders Placed During Today's Visit     Future Labs/Procedures Expected by Expires    X-Ray Chest PA And Lateral  2/21/2017 2/21/2018    Spirometry with/without bronchodilator  As directed 2/21/2018      Instructions    Your provider has scheduled you for a sleep study.   You should be receiving a phone call from the sleep lab shortly after your study has been approved by your insurance. Please make sure you have your current phone numbers in the [facility] system. If you do not hear from anyone in the next 10 business days, please call the sleep lab at [phone] to schedule your sleep study. The sleep studies are performed at [facility] seven nights a week.  When you are scheduling your sleep study, they will also make you a follow up appointment with your provider. This follow up appointment will be 10-14 days after your sleep study to review the results. If it is noted that you do have sleep apnea on your initial sleep study, you may receive a call back for a second night study with the CPAP before you come back to the office.        Language Assistance Services     ATTENTION: Language assistance services are available, free of charge. Please call [phone].      ATENCIÓN: Si habla español, tiene a pennington disposición servicios gratuitos de asistencia lingüística. Llame al [phone].     CHÚ Ý: N?u b?n nói Ti?ng Vi?t, có các d?ch v? h? tr? ngôn ng? mi?n phí dành cho b?n. G?i s? [phone].         [facility] complies with applicable Federal civil rights laws and does not discriminate on the basis of race, color, national origin, age, disability, or sex.         Ambulatory with cane/crutches/walker

## 2023-06-09 NOTE — ED PROVIDER NOTE - NSFOLLOWUPINSTRUCTIONS_ED_ALL_ED_FT
You were seen in the ED and diagnosed with nasal bone fracture.  Do not blow your nose.  Follow up with ENT in the office.  Alcohol Abuse    Alcohol intoxication occurs when the amount of alcohol that a person has consumed impairs his or her ability to mentally and physically function. Chronic alcohol consumption can also lead to a variety of health issues including neurological disease, stomach disease, heart disease, liver disease, etc. Do not drive after drinking alcohol. Drinking enough alcohol to end up in an Emergency Room suggests you may have an alcohol abuse problem. Seek help at a drug addiction center.    SEEK IMMEDIATE MEDICAL CARE IF YOU HAVE ANY OF THE FOLLOWING SYMPTOMS: seizures, vomiting blood, blood in your stool, lightheadedness/dizziness, or becoming shaky to tremulous when you stop drinking.

## 2023-06-09 NOTE — ED ADULT TRIAGE NOTE - CHIEF COMPLAINT QUOTE
BIBA from shelter for alcohol intoxication, emt states he was discharge here, around 5 or 6am, came back to shelter and started drinking again, patient states he drank beer that was 90%, noted facial abrasions, unknown loc, unsteady gait as per emt

## 2023-06-09 NOTE — ED PROVIDER NOTE - PROGRESS NOTE DETAILS
Patient observed ambulating in the ED without difficulty, CT with nasal bone fractures  Will discharge home Patient observed ambulating in the ED without difficulty, clinically sober, CT with nasal bone fractures  Will discharge home

## 2023-06-09 NOTE — ED PROVIDER NOTE - PHYSICAL EXAMINATION
GENERAL: Awake, alert, NAD  HEENT: +obvious facial trauma and abrasions, moist mucous membranes  LUNGS: CTAB, no wheezes or crackles   CARDIAC: RRR, no m/r/g  EXT: No edema, no calf tenderness, no deformities.  NEURO: Appears intoxicated. Moving all extremities.  SKIN: Warm and dry. No rash.  PSYCH: Normal affect.

## 2023-06-09 NOTE — ED ADULT NURSE NOTE - OBJECTIVE STATEMENT
Received pt in the ED, intoxicated from shelter , vomited once in the ambulance. Pt has walker at the bedside. Received pt in the ED, intoxicated from shelter , vomited once in the ambulance. Pt has walker at the bedside. Speaking full complete sentences. Uncooperative with assessment. Verbally abusive to the staff.

## 2023-06-09 NOTE — ED PROVIDER NOTE - PHYSICAL EXAMINATION
General: clinically intoxicated, (+) AOB , slurred speech  HEENT: NCAT, Neck supple without meningismus, PERRL, no conjunctival injection  Lungs: CTAB, No wheeze or crackles, No retractions, No increased work of breathing  Heart: S1S2 RRR, No M/R/G, Pules equal Bilaterally in upper and lower extremities distally  Abd: soft, NT/ND, No guarding, No rebound.  No hernias, no palpable masses.  Extrem: FROM in all joints, no gross deformities appreciated, no significant edema noted, No ulcers. Cap refil < 2sec.  Skin: No rash noted, warm dry.  Neuro:  Grossly normal.  No difficulty ambulating. No focal deficits.

## 2023-06-09 NOTE — ED PROVIDER NOTE - NS ED ROS FT
CONST: no fevers, no chills  EYES: no pain, no vision changes  ENT: no sore throat, no ear pain, no change in hearing  CV: no chest pain, no leg swelling  RESP: no shortness of breath, no cough  ABD: no abdominal pain, (+) nausea, no vomiting, no diarrhea  : no dysuria, no flank pain, no hematuria  MSK: no back pain, no extremity pain  NEURO: no headache or additional neurologic complaints  HEME: no easy bleeding  SKIN:  no rash

## 2023-06-09 NOTE — ED PROVIDER NOTE - NSFOLLOWUPCLINICS_GEN_ALL_ED_FT
[facility] - ENT  Otolaryngology (ENT)  [address] [place] [address]  Phone: [phone]  Fax:   Follow Up Time: 1-3 Days

## 2023-06-09 NOTE — ED PROVIDER NOTE - OBJECTIVE STATEMENT
62 y/o M with PMH alcohol abuse presenting to the ED with acute intoxication. Patient was seen in the ED last night for acute intox and discharged back to shelter. Reportedly was drinking beer when he returned back. Now with facial trauma. Patient appears intoxicated, unsure of what happened. Hx limited.

## 2023-06-09 NOTE — ED PROVIDER NOTE - PATIENT PORTAL LINK FT
You can access the FollowMyHealth Patient Portal offered by [facility] by registering at the following website: [email or web address]. By joining [facility]’s FollowMyHealth portal, you will also be able to view your health information using other applications (apps) compatible with our system.

## 2023-06-10 VITALS
OXYGEN SATURATION: 99 % | DIASTOLIC BLOOD PRESSURE: 82 MMHG | HEART RATE: 71 BPM | RESPIRATION RATE: 16 BRPM | TEMPERATURE: 98 F | SYSTOLIC BLOOD PRESSURE: 178 MMHG

## 2023-06-10 RX ORDER — ACETAMINOPHEN 500 MG
975 TABLET ORAL ONCE
Refills: 0 | Status: COMPLETED | OUTPATIENT
Start: 2023-06-10 | End: 2023-06-10

## 2023-06-10 RX ORDER — AMLODIPINE BESYLATE 2.5 MG/1
5 TABLET ORAL ONCE
Refills: 0 | Status: COMPLETED | OUTPATIENT
Start: 2023-06-10 | End: 2023-06-10

## 2023-06-10 RX ADMIN — Medication 975 MILLIGRAM(S): at 04:55

## 2023-06-10 RX ADMIN — Medication 975 MILLIGRAM(S): at 03:51

## 2023-06-10 RX ADMIN — AMLODIPINE BESYLATE 5 MILLIGRAM(S): 2.5 TABLET ORAL at 04:56

## 2023-06-10 NOTE — ED ADULT NURSE REASSESSMENT NOTE - NS ED NURSE REASSESS COMMENT FT1
patient complaining of facial pain, rating 8/10. MD Chisholm aware, orders placed, PO medication given.

## 2023-06-10 NOTE — ED ADULT NURSE REASSESSMENT NOTE - PAIN RATING/NUMBER SCALE (0-10): ACTIVITY
0 (no pain/absence of nonverbal indicators of pain)
0 (no pain/absence of nonverbal indicators of pain)
8 (severe pain)
incontinent, diaper dependent, confused

## 2023-06-10 NOTE — ED ADULT NURSE REASSESSMENT NOTE - NS ED NURSE REASSESS COMMENT FT1
patient resting in stretcher, aroused by voice. patient offered food and water, states he is ok for now. patient awaiting SW in AM. NAD noted

## 2023-06-10 NOTE — ED ADULT NURSE REASSESSMENT NOTE - AS PAIN REST
0 (no pain/absence of nonverbal indicators of pain)
0 (no pain/absence of nonverbal indicators of pain)
8 (severe pain)

## 2023-06-10 NOTE — ED ADULT NURSE REASSESSMENT NOTE - NS ED NURSE REASSESS COMMENT FT1
Received report from night RN, patient A&Ox4 has comfortable appearance. Awaiting discharge back to shelter

## 2023-06-10 NOTE — ED ADULT NURSE REASSESSMENT NOTE - NSFALLRISKINTERV_ED_ALL_ED

## 2023-06-10 NOTE — ED ADULT NURSE REASSESSMENT NOTE - NS ED NURSE REASSESS COMMENT FT1
Report received from MARIA LUZ Juares. patient is a&ox3, ambulating in walker to bathroom. patient awaiting SW in AM. patient provided with additional blankets. MD Chisholm made aware of high BP. Awaiting orders.

## 2023-09-05 NOTE — PHYSICAL THERAPY INITIAL EVALUATION ADULT - DISCHARGE PLANNER MADE AWARE
Croatian speaking pt presents to the ER left finger fx seen in ER 7/18, reports followed up with orthopedics and wore finger splint for 1 month.  This past week he reports pain and swelling w/o injury. Has not taken anything for pain and declines pain medication d/t does not like taking medication.     Triage Assessment       Row Name 09/05/23 0943       Triage Assessment (Adult)    Airway WDL WDL       Respiratory WDL    Respiratory WDL WDL       Cardiac WDL    Cardiac WDL WDL                     yes

## 2023-10-02 NOTE — PROGRESS NOTE ADULT - SUBJECTIVE AND OBJECTIVE BOX
There are no preventive care reminders to display for this patient.    Patient is up to date, no discussion needed.    Patient would like communication of their results via:        Cell Phone:   Telephone Information:   Mobile [phone]     Okay to leave a message containing results? No   Primary Diagnosis: COVID-19 associated pneumonia    SUBJECTIVE / HPI:  60y Male with PMH of HTN, DM complicated by neuropathy, EtOH liver cirrhosis, who presents to [facility] for fall at home. At [facility] patient reported that he takes lactulose at home daily and has daily BM, but he has had no BM for the last 5 days. At [facility] labs concerning for WBC 19, Cr 2.28, Lactates 8.6 (6.8 after 2L of IVF)CT showing distended colon with small area of pneumatosis. On arrival patient was stable, denied abdominal pain, said he has had 5 liquid/non bloody BM since coming to the ED. Patient says he has had a Cscope in the past but does not remmeber when, he said it was normal.    1 unit prbcs trx 4/13  paracentesis performed, drained 240cc sent for culture 4/13  ID Following        No acute events overnight      ICU Vital Signs Last 24 Hrs  T(C): 36.9 (15 Apr 2020 08:36), Max: 36.9 (14 Apr 2020 17:00)  T(F): 98.4 (15 Apr 2020 08:36), Max: 98.5 (14 Apr 2020 17:00)  HR: 79 (15 Apr 2020 08:36) (79 - 90)  BP: 116/58 (15 Apr 2020 08:36) (116/58 - 155/68)  BP(mean): --  ABP: --  ABP(mean): --  RR: 18 (15 Apr 2020 08:36) (17 - 20)  SpO2: 99% (15 Apr 2020 08:36) (94% - 99%)    I&O's Detail    14 Apr 2020 07:01  -  15 Apr 2020 07:00  --------------------------------------------------------  IN:    sodium chloride 0.9%: 900 mL    Solution: 100 mL    Solution: 250 mL  Total IN: 1250 mL    OUT:    Ileostomy: 400 mL    Ileostomy: 800 mL    Voided: 700 mL  Total OUT: 1900 mL    Total NET: -650 mL        Exam: Gen NAD, comfortable  HEENT: NC/AT, PERRLA, anicteric, neck supple, FROM, NT  Neuro: Non focal, A&OX3  pulm: RA 99 %  CV: NSR, normotensive  Abd: Distended, mildly tender. wound C/D/I  Ext: No C/C/E, no calf tenderness  : [place] cath/voiding.    Labs: Pending    MEDICATIONS  (STANDING):  amLODIPine   Tablet 10 milliGRAM(s) Oral daily  aspirin enteric coated 81 milliGRAM(s) Oral daily  atorvastatin 10 milliGRAM(s) Oral at bedtime  azithromycin  IVPB 250 milliGRAM(s) IV Intermittent every 24 hours  azithromycin  IVPB      dextrose 5%. 1000 milliLiter(s) (50 mL/Hr) IV Continuous <Continuous>  dextrose 50% Injectable 12.5 Gram(s) IV Push once  dextrose 50% Injectable 25 Gram(s) IV Push once  dextrose 50% Injectable 25 Gram(s) IV Push once  gabapentin 100 milliGRAM(s) Oral every 6 hours  hydroxychloroquine 400 milliGRAM(s) Oral every 24 hours  hydroxychloroquine   Oral   insulin glargine Injectable (LANTUS) 12 Unit(s) SubCutaneous at bedtime  insulin lispro (HumaLOG) corrective regimen sliding scale   SubCutaneous Before meals and at bedtime  insulin lispro Injectable (HumaLOG) 8 Unit(s) SubCutaneous three times a day before meals  levothyroxine Injectable 40 MICROGram(s) IV Push at bedtime  loperamide 2 milliGRAM(s) Oral every 6 hours  pantoprazole  Injectable 40 milliGRAM(s) IV Push two times a day    MEDICATIONS  (PRN):  acetaminophen   Tablet .. 650 milliGRAM(s) Oral every 6 hours PRN Temp greater or equal to 38C (100.4F), Mild Pain (1 - 3)  dextrose 40% Gel 15 Gram(s) Oral once PRN Blood Glucose LESS THAN 70 milliGRAM(s)/deciliter  glucagon  Injectable 1 milliGRAM(s) IntraMuscular once PRN Glucose LESS THAN 70 milligrams/deciliter  hydrALAZINE Injectable 10 milliGRAM(s) IV Push every 4 hours PRN SBP >170  labetalol Injectable 20 milliGRAM(s) IV Push every 6 hours PRN 2nd line drug, Systolic blood pressure > 170  ondansetron Injectable 4 milliGRAM(s) IV Push every 6 hours PRN Nausea and/or Vomiting  oxycodone    5 mG/acetaminophen 325 mG 1 Tablet(s) Oral every 6 hours PRN Severe Pain (7 - 10)  traZODone 50 milliGRAM(s) Oral at bedtime PRN insomnia      A/P:   This is a 59 y/o M with PMH of HTN, DM complicated by neuropathy, EtOH liver cirrhosis, who presents to [facility] for fall at home.   At [facility] patient reported that he takes lactulose at home daily and has daily BM, but he has had no BM for the last 5 days.   At [facility] labs concerning for WBC 19, Cr 2.28, Lactates 8.6 (6.8 after 2L of IVF)  CT showing distended colon with small area of pneumatosis. Patient underwent ex lap and subtotal colectomy, and ileostomy. 3/31, he initially tested negative for covid 19, but subsequent test was positive, patient was transferred to tele covid unit, but has not required supplemental oxygen, he remains on RA O2 sat 99%, afebrile.  ID is following and does not think he needs to be on zosyn, recommends GI consult to assess for hepato renal syndrome in setting of up trending RFT's.    ASSESSMENT:  60y Male s/p    PLAN:  NEURO:     CARDIOVASCULAR: Continue current care monitor QTC    PULMONARY: RA, O2 99%    RENAL: MEG on CKD, nephrology to see    GI: Abd distention, Abd x-ray ordered, GI consulted.    HEME: Low, but stable H&H    ID: No active signs of infection, afebrile, continue to monitor     ENDO: Normoglycemic    DVT PROPHYLAXIS:  [] Venodynes                                [x] Heparin/Lovenox    FALL RISK:  [x] Low Risk                                    [] Impulsive    Dispo: Continue tele covid observation, follow up labs Primary Diagnosis: COVID-19 associated pneumonia    SUBJECTIVE / HPI:  60y Male with PMH of HTN, DM complicated by neuropathy, EtOH liver cirrhosis, who presents to [facility] for fall at home. At [facility] patient reported that he takes lactulose at home daily and has daily BM, but he has had no BM for the last 5 days. At [facility] labs concerning for WBC 19, Cr 2.28, Lactates 8.6 (6.8 after 2L of IVF)CT showing distended colon with small area of pneumatosis. On arrival patient was stable, denied abdominal pain, said he has had 5 liquid/non bloody BM since coming to the ED. Patient says he has had a Cscope in the past but does not remmeber when, he said it was normal.    1 unit prbcs trx 4/13  paracentesis performed, drained 240cc sent for culture 4/13  ID Following        No acute events overnight      ICU Vital Signs Last 24 Hrs  T(C): 36.9 (15 Apr 2020 08:36), Max: 36.9 (14 Apr 2020 17:00)  T(F): 98.4 (15 Apr 2020 08:36), Max: 98.5 (14 Apr 2020 17:00)  HR: 79 (15 Apr 2020 08:36) (79 - 90)  BP: 116/58 (15 Apr 2020 08:36) (116/58 - 155/68)  BP(mean): --  ABP: --  ABP(mean): --  RR: 18 (15 Apr 2020 08:36) (17 - 20)  SpO2: 99% (15 Apr 2020 08:36) (94% - 99%)    I&O's Detail    14 Apr 2020 07:01  -  15 Apr 2020 07:00  --------------------------------------------------------  IN:    sodium chloride 0.9%: 900 mL    Solution: 100 mL    Solution: 250 mL  Total IN: 1250 mL    OUT:    Ileostomy: 400 mL    Ileostomy: 800 mL    Voided: 700 mL  Total OUT: 1900 mL    Total NET: -650 mL        Exam: Gen NAD, comfortable  HEENT: NC/AT, PERRLA, anicteric, neck supple, FROM, NT  Neuro: Non focal, A&OX3  pulm: RA 99 %  CV: NSR, normotensive  Abd: Distended, mildly tender. wound C/D/I  Ext: No C/C/E, no calf tenderness  : [place] cath/voiding.    Labs:   < from: Xray Chest 1 View- PORTABLE-Routine (04.15.20 @ 01:41) >  PROCEDURE DATE:  04/15/2020          INTERPRETATION:  Chest x-ray    Indication: COVID-19 infection.     A portable frontal view of the chest is compared to the prior study dated 4/12/2020. Mild cardiomegaly. Slightly increased patchy bilateral pulmonary infiltrates. No pleural effusion or pneumothorax.      IMPRESSION: Slightly increased patchy bilateral infiltrates.      < end of copied text >      MEDICATIONS  (STANDING):  amLODIPine   Tablet 10 milliGRAM(s) Oral daily  aspirin enteric coated 81 milliGRAM(s) Oral daily  atorvastatin 10 milliGRAM(s) Oral at bedtime  azithromycin  IVPB 250 milliGRAM(s) IV Intermittent every 24 hours  azithromycin  IVPB      dextrose 5%. 1000 milliLiter(s) (50 mL/Hr) IV Continuous <Continuous>  dextrose 50% Injectable 12.5 Gram(s) IV Push once  dextrose 50% Injectable 25 Gram(s) IV Push once  dextrose 50% Injectable 25 Gram(s) IV Push once  gabapentin 100 milliGRAM(s) Oral every 6 hours  hydroxychloroquine 400 milliGRAM(s) Oral every 24 hours  hydroxychloroquine   Oral   insulin glargine Injectable (LANTUS) 12 Unit(s) SubCutaneous at bedtime  insulin lispro (HumaLOG) corrective regimen sliding scale   SubCutaneous Before meals and at bedtime  insulin lispro Injectable (HumaLOG) 8 Unit(s) SubCutaneous three times a day before meals  levothyroxine Injectable 40 MICROGram(s) IV Push at bedtime  loperamide 2 milliGRAM(s) Oral every 6 hours  pantoprazole  Injectable 40 milliGRAM(s) IV Push two times a day    MEDICATIONS  (PRN):  acetaminophen   Tablet .. 650 milliGRAM(s) Oral every 6 hours PRN Temp greater or equal to 38C (100.4F), Mild Pain (1 - 3)  dextrose 40% Gel 15 Gram(s) Oral once PRN Blood Glucose LESS THAN 70 milliGRAM(s)/deciliter  glucagon  Injectable 1 milliGRAM(s) IntraMuscular once PRN Glucose LESS THAN 70 milligrams/deciliter  hydrALAZINE Injectable 10 milliGRAM(s) IV Push every 4 hours PRN SBP >170  labetalol Injectable 20 milliGRAM(s) IV Push every 6 hours PRN 2nd line drug, Systolic blood pressure > 170  ondansetron Injectable 4 milliGRAM(s) IV Push every 6 hours PRN Nausea and/or Vomiting  oxycodone    5 mG/acetaminophen 325 mG 1 Tablet(s) Oral every 6 hours PRN Severe Pain (7 - 10)  traZODone 50 milliGRAM(s) Oral at bedtime PRN insomnia      A/P:   This is a 61 y/o M with PMH of HTN, DM complicated by neuropathy, EtOH liver cirrhosis, who presents to [facility] for fall at home.   At [facility] patient reported that he takes lactulose at home daily and has daily BM, but he has had no BM for the last 5 days.   At [facility] labs concerning for WBC 19, Cr 2.28, Lactates 8.6 (6.8 after 2L of IVF)  CT showing distended colon with small area of pneumatosis. Patient underwent ex lap and subtotal colectomy, and ileostomy. 3/31, he initially tested negative for covid 19, but subsequent test was positive, patient was transferred to tele covid unit, but has not required supplemental oxygen, he remains on RA O2 sat 99%, afebrile.  ID is following and does not think he needs to be on zosyn, recommends GI consult to assess for hepato renal syndrome in setting of up trending RFT's.    ASSESSMENT:  60y Male s/p    PLAN:  NEURO:     CARDIOVASCULAR: Continue current care monitor QTC    PULMONARY: RA, O2 99%    RENAL: MEG on CKD, nephrology to see    GI: Abd distention, Abd x-ray ordered, GI consulted.    HEME: Low, but stable H&H    ID: No active signs of infection, afebrile, continue to monitor     ENDO: Normoglycemic    DVT PROPHYLAXIS:  [] Venodynes                                [x] Heparin/Lovenox    FALL RISK:  [x] Low Risk                                    [] Impulsive    Dispo: Continue tele covid observation, follow up labs Primary Diagnosis: COVID-19 associated pneumonia    SUBJECTIVE / HPI:  60y Male with PMH of HTN, DM complicated by neuropathy, EtOH liver cirrhosis, who presents to [facility] for fall at home. At [facility] patient reported that he takes lactulose at home daily and has daily BM, but he has had no BM for the last 5 days. At [facility] labs concerning for WBC 19, Cr 2.28, Lactates 8.6 (6.8 after 2L of IVF)CT showing distended colon with small area of pneumatosis. On arrival patient was stable, denied abdominal pain, said he has had 5 liquid/non bloody BM since coming to the ED. Patient says he has had a Cscope in the past but does not remmeber when, he said it was normal.    1 unit prbcs trx 4/13  paracentesis performed, drained 240cc sent for culture 4/13  ID Following        No acute events overnight      ICU Vital Signs Last 24 Hrs  T(C): 36.9 (15 Apr 2020 08:36), Max: 36.9 (14 Apr 2020 17:00)  T(F): 98.4 (15 Apr 2020 08:36), Max: 98.5 (14 Apr 2020 17:00)  HR: 79 (15 Apr 2020 08:36) (79 - 90)  BP: 116/58 (15 Apr 2020 08:36) (116/58 - 155/68)  BP(mean): --  ABP: --  ABP(mean): --  RR: 18 (15 Apr 2020 08:36) (17 - 20)  SpO2: 99% (15 Apr 2020 08:36) (94% - 99%)    I&O's Detail    14 Apr 2020 07:01  -  15 Apr 2020 07:00  --------------------------------------------------------  IN:    sodium chloride 0.9%: 900 mL    Solution: 100 mL    Solution: 250 mL  Total IN: 1250 mL    OUT:    Ileostomy: 400 mL    Ileostomy: 800 mL    Voided: 700 mL  Total OUT: 1900 mL    Total NET: -650 mL        Exam: Gen NAD, comfortable  HEENT: NC/AT, PERRLA, anicteric, neck supple, FROM, NT  Neuro: Non focal, A&OX3  pulm: RA 99 %  CV: NSR, normotensive  Abd: Distended, mildly tender. wound C/D/I  Ext: No C/C/E, no calf tenderness  : [place] cath/voiding.    Labs:   < from: Xray Chest 1 View- PORTABLE-Routine (04.15.20 @ 01:41) >  PROCEDURE DATE:  04/15/2020          INTERPRETATION:  Chest x-ray    Indication: COVID-19 infection.     A portable frontal view of the chest is compared to the prior study dated 4/12/2020. Mild cardiomegaly. Slightly increased patchy bilateral pulmonary infiltrates. No pleural effusion or pneumothorax.      IMPRESSION: Slightly increased patchy bilateral infiltrates.      < end of copied text >      MEDICATIONS  (STANDING):  amLODIPine   Tablet 10 milliGRAM(s) Oral daily  aspirin enteric coated 81 milliGRAM(s) Oral daily  atorvastatin 10 milliGRAM(s) Oral at bedtime  azithromycin  IVPB 250 milliGRAM(s) IV Intermittent every 24 hours  azithromycin  IVPB      dextrose 5%. 1000 milliLiter(s) (50 mL/Hr) IV Continuous <Continuous>  dextrose 50% Injectable 12.5 Gram(s) IV Push once  dextrose 50% Injectable 25 Gram(s) IV Push once  dextrose 50% Injectable 25 Gram(s) IV Push once  gabapentin 100 milliGRAM(s) Oral every 6 hours  hydroxychloroquine 400 milliGRAM(s) Oral every 24 hours  hydroxychloroquine   Oral   insulin glargine Injectable (LANTUS) 12 Unit(s) SubCutaneous at bedtime  insulin lispro (HumaLOG) corrective regimen sliding scale   SubCutaneous Before meals and at bedtime  insulin lispro Injectable (HumaLOG) 8 Unit(s) SubCutaneous three times a day before meals  levothyroxine Injectable 40 MICROGram(s) IV Push at bedtime  loperamide 2 milliGRAM(s) Oral every 6 hours  pantoprazole  Injectable 40 milliGRAM(s) IV Push two times a day    MEDICATIONS  (PRN):  acetaminophen   Tablet .. 650 milliGRAM(s) Oral every 6 hours PRN Temp greater or equal to 38C (100.4F), Mild Pain (1 - 3)  dextrose 40% Gel 15 Gram(s) Oral once PRN Blood Glucose LESS THAN 70 milliGRAM(s)/deciliter  glucagon  Injectable 1 milliGRAM(s) IntraMuscular once PRN Glucose LESS THAN 70 milligrams/deciliter  hydrALAZINE Injectable 10 milliGRAM(s) IV Push every 4 hours PRN SBP >170  labetalol Injectable 20 milliGRAM(s) IV Push every 6 hours PRN 2nd line drug, Systolic blood pressure > 170  ondansetron Injectable 4 milliGRAM(s) IV Push every 6 hours PRN Nausea and/or Vomiting  oxycodone    5 mG/acetaminophen 325 mG 1 Tablet(s) Oral every 6 hours PRN Severe Pain (7 - 10)  traZODone 50 milliGRAM(s) Oral at bedtime PRN insomnia      A/P:   This is a 61 y/o M with PMH of HTN, DM complicated by neuropathy, EtOH liver cirrhosis, who presents to [facility] for fall at home.   At [facility] patient reported that he takes lactulose at home daily and has daily BM, but he has had no BM for the last 5 days.   At [facility] labs concerning for WBC 19, Cr 2.28, Lactates 8.6 (6.8 after 2L of IVF)  CT showing distended colon with small area of pneumatosis. Patient underwent ex lap and subtotal colectomy, and ileostomy. 3/31, he initially tested negative for covid 19, but subsequent test was positive, patient was transferred to tele covid unit, but has not required supplemental oxygen, he remains on RA O2 sat 99%, afebrile.  ID is following and does not think he needs to be on zosyn, recommends GI consult to assess for hepato renal syndrome in setting of up trending RFT's.      PLAN:  NEURO: No issues    CARDIOVASCULAR: Continue current care monitor QTC    PULMONARY: RA, O2 99%    RENAL: MEG on CKD, nephrology following started Na+ bicarb tabs    GI: Abd distention, Abd x-ray ordered, GI consulted.    HEME: Low, but stable H&H    ID: No active signs of infection, afebrile, continue to monitor     ENDO: Normoglycemic    DVT PROPHYLAXIS:  [] Venodynes                                [x] Heparin/Lovenox    FALL RISK:  [x] Low Risk                                    [] Impulsive    Dispo: Continue tele covid observation, follow up labs

## 2023-10-09 NOTE — PROVIDER CONTACT NOTE (CRITICAL VALUE NOTIFICATION) - TEST AND RESULT REPORTED:
Interval History: Out of bed to chair this AM. Pain well controlled. Had 1 bowel movement yesterday. Minimal SS output from neck PADMA drain. Room air, no O2 requirements after lasix yesterday.    Medications:  Continuous Infusions:  Scheduled Meds:   acetaminophen  999.0148 mg Per J Tube Q8H    bisacodyL  10 mg Rectal Daily    enoxparin  40 mg Subcutaneous Q24H (prophylaxis, 1700)    levalbuterol  0.63 mg Nebulization Q6H WAKE    mupirocin  1 g Nasal BID    pantoprazole  40 mg Oral Daily    sennosides 8.8 mg/5 ml  5 mL Per J Tube QHS    tamsulosin  0.4 mg Oral Daily     PRN Meds:dextrose 10%, dextrose 10%, glucagon (human recombinant), hydrALAZINE, HYDROmorphone, insulin aspart U-100, labetalol, ondansetron, oxyCODONE, oxyCODONE, prochlorperazine     Review of patient's allergies indicates:  No Known Allergies  Objective:     Vital Signs (Most Recent):  Temp: 98 °F (36.7 °C) (10/09/23 0428)  Pulse: 73 (10/09/23 0428)  Resp: 18 (10/09/23 0428)  BP: 139/68 (10/09/23 0428)  SpO2: (!) 93 % (10/09/23 0428) Vital Signs (24h Range):  Temp:  [98 °F (36.7 °C)-99 °F (37.2 °C)] 98 °F (36.7 °C)  Pulse:  [68-79] 73  Resp:  [16-18] 18  SpO2:  [93 %-98 %] 93 %  BP: (114-139)/(62-79) 139/68     Weight: 61.2 kg (135 lb)  Body mass index is 19.93 kg/m².    Intake/Output - Last 3 Shifts         10/07 0700  10/08 0659 10/08 0700  10/09 0659 10/09 0700  10/10 0659    I.V. (mL/kg)       NG/ 30     IV Piggyback       Total Intake(mL/kg) 402 (6.6) 30 (0.5)     Urine (mL/kg/hr) 700 (0.5) 1100 (0.7)     Drains 5 0     Stool 0 0     Chest Tube 10      Total Output 715 1100     Net -[phone]            Urine Occurrence 1 x 1 x     Stool Occurrence 0 x 1 x              Physical Exam  Vitals and nursing note reviewed.   Constitutional:       General: He is not in acute distress.     Interventions: He is sedated.   HENT:      Head: Normocephalic and atraumatic.      Right Ear: External ear normal.      Left Ear: External ear normal.       Nose: Nose normal.   Eyes:      General: No scleral icterus.     Extraocular Movements: Extraocular movements intact.   Neck:      Comments: Left lateral neck incision cdi  PADMA with SS output  Breath not foul smelling  Cardiovascular:      Rate and Rhythm: Normal rate and regular rhythm.   Pulmonary:      Effort: Pulmonary effort is normal. No respiratory distress.   Abdominal:      General: Abdomen is flat. There is no distension.      Palpations: Abdomen is soft.      Comments: J tube in place   Skin:     General: Skin is warm and dry.   Neurological:      General: No focal deficit present.      Cranial Nerves: No cranial nerve deficit.   Psychiatric:         Mood and Affect: Mood normal.         Behavior: Behavior normal.          Significant Labs:  I have reviewed all pertinent lab results within the past 24 hours.    Significant Diagnostics:  I have reviewed all pertinent imaging results/findings within the past 24 hours.   Potassium 6.9

## 2023-10-11 NOTE — PROGRESS NOTE ADULT - ASSESSMENT
60 y/o M HTN, DM complicated by neuropathy, EtOH liver cirrhosis, hx IVDU, toxic megacolon c/b pneumatosis s/p colectomy/ileostomy who presents with generalized weakness x 3 days found to have MEG, hyponatremia, hyperkalemia w/ EKG changes and bradycardia admitted to ICU for management Continues to be hyponatremic.     NEURO   hyperkalemia   lokelma 10 mg if higher than 5.5     # Delirium   - Agitated overnight. Easily reoriented.   - Likely due to ICU setting       CARDIOVASCULAR  #Hypertension   - Amlodipine 10mg   - Holding home lisinopril due to normal blood pressures     PULMONARY  #LYNN    GASTROINTESTINAL  - Bowel regimen as needed     RENAL    #Hyponatremia  - Urine output 40-50 ml per hour  - Na improving (121)   - 1L NA 100ml/hr   - 1g salt tabs TID     INFECTIOUS DISEASE  #lynn      ENDOCRINE  #DM  - ISS   - Holding home Janumet and Levemir     HEME  #lynn    FLUIDS/ELECTROLYTES/NUTRITION    -Monitor, Replete to K>4 and Mg>2  -Diet: Consistent carbohydrate   PROPHYLAXIS: Heparin   -DVT  -GI    DISPO  CODE STATUS 62 y/o M HTN, DM complicated by neuropathy, EtOH liver cirrhosis, hx IVDU, toxic megacolon c/b pneumatosis s/p colectomy/ileostomy who presents with generalized weakness x 3 days found to have MEG, hyponatremia, hyperkalemia w/ EKG changes and bradycardia admitted to ICU for management Continues to be hyponatremic.     NEURO   hyperkalemia   lokelma 10 mg if higher than 5.5     # Delirium   - Agitated overnight. Easily reoriented.   - Likely due to ICU setting       CARDIOVASCULAR  #Hypertension   - Amlodipine 10mg   - Holding home lisinopril due to normal blood pressures     PULMONARY  #LYNN    GASTROINTESTINAL  - Bowel regimen as needed     RENAL    #Hyponatremia  - Urine output 40-50 ml per hour  - Na improving (121)   - 1L NA 100ml/hr   - 1g salt tabs TID     INFECTIOUS DISEASE  #lynn      ENDOCRINE  #DM  - ISS   - Holding home Janumet and Levemir     HEME  #lynn    FLUIDS/ELECTROLYTES/NUTRITION    -Monitor, Replete to K>4 and Mg>2  -Diet: Consistent carbohydrate   PROPHYLAXIS: Heparin   -DVT  -GI    DISPO  CODE STATUS 60 y/o M HTN, DM complicated by neuropathy, EtOH liver cirrhosis, hx IVDU, toxic megacolon c/b pneumatosis s/p colectomy/ileostomy who presents with generalized weakness x 3 days found to have MEG, hyponatremia, hyperkalemia admitted for management and treatment.   Bed/Stretcher in lowest position, wheels locked, appropriate side rails in place/Call bell, personal items and telephone in reach/Instruct patient to call for assistance before getting out of bed/chair/stretcher/Non-slip footwear applied when patient is off stretcher/[place] to call system/Physically safe environment - no spills, clutter or unnecessary equipment/Purposeful proactive rounding/Room/bathroom lighting operational, light cord in reach

## 2023-11-03 NOTE — PHYSICAL THERAPY INITIAL EVALUATION ADULT - PHYSICAL ASSIST/NONPHYSICAL ASSIST: GAIT, REHAB EVAL
Primary Care Physician: Jennifer Monzon DO   Date of Visit: Lois Gentile, here to follow up on test results   New to me    He has intermittent numbness in lt arm   No chest pain or angina  He has shortness with exertion and it is getting worse. No wheezing     Had normal stress test in    No le edema   No change in weight  Bp is controlled     Review of Systems   Review of Systems   12 points review of systems are negative expect for the above    Social History:  Social History     Socioeconomic History    Marital status:      Spouse name: Not on file    Number of children: Not on file    Years of education: Not on file    Highest education level: Not on file   Occupational History    Not on file   Tobacco Use    Smoking status: Former     Types: Cigarettes     Quit date:      Years since quittin.8    Smokeless tobacco: Never   Vaping Use    Vaping Use: Never used   Substance and Sexual Activity    Alcohol use: Yes     Alcohol/week: 7.0 standard drinks of alcohol     Types: 7 Cans of beer per week    Drug use: Never    Sexual activity: Defer   Other Topics Concern    Not on file   Social History Narrative    Not on file     Social Determinants of Health     Financial Resource Strain: Not on file   Food Insecurity: Not on file   Transportation Needs: Not on file   Physical Activity: Not on file   Stress: Not on file   Social Connections: Not on file   Intimate Partner Violence: Not on file   Housing Stability: Not on file        Past Medical History:  History reviewed. No pertinent past medical history.    Past Surgical History:  Past Surgical History:   Procedure Laterality Date    HERNIA REPAIR  2021    Hernia Repair       Family History:  Family History   Problem Relation Name Age of Onset    Rectal  "2023    10:21 AM   Vitals   Systolic 149 140 126 132   Diastolic 92 86 82 82   Heart Rate 80 80 105 87   Temp 36.5 °C (97.7 °F)  36 °C (96.8 °F)    Resp 18      Height (in) 1.803 m (5' 10.98\") 1.778 m (5' 10\") 1.778 m (5' 10\")    Weight (lb)  223.25 223 229.7   BMI  32.03 kg/m2 32 kg/m2 32.96 kg/m2   BSA (m2)  2.23 m2 2.23 m2 2.27 m2   Visit Report   Report Report      Constitutional:       Appearance: Healthy appearance. Not in distress.   Neck:      Vascular: No JVR. JVD normal.   Pulmonary:      Effort: Pulmonary effort is normal.      Breath sounds: Normal breath sounds. No wheezing. No rhonchi. No rales.   Chest:      Chest wall: Not tender to palpatation.   Cardiovascular:      PMI at left midclavicular line. IRR      Murmurs: There is no murmur.      No gallop.  No click. No rub.   Pulses:     Intact distal pulses.   Edema:     Peripheral edema absent.   Abdominal:      General: Bowel sounds are normal.      Palpations: Abdomen is soft.      Tenderness: There is no abdominal tenderness.   Musculoskeletal: Normal range of motion.         General: No tenderness.   Skin:     General: Skin is warm and dry.   Neurological:      General: No focal deficit present.      Mental Status: Alert and oriented to person, place and time.     Allergies:  Allergies   Allergen Reactions    Indomethacin Headache     Severe per patient       Medications:  Current Outpatient Medications   Medication Instructions    ASCORBIC ACID, VITAMIN C, ORAL 1 tablet, oral, Daily    cholecalciferol (VITAMIN D3) 25 mcg, oral, Daily    dilTIAZem CD (CARDIZEM CD) 240 mg, oral, 2 times daily    Eliquis 5 mg tablet oral, Every 12 hours    magnesium 30 mg, oral, 2 times daily    MULTIVITAMIN ORAL 1 " supervision/verbal cues/nonverbal cues (demo/gestures) OF COLONOSCOPY    potassium citrate 99 mg capsule oral    verapamil ER (VERALAN PM) 180 mg, oral, 2 times daily, Do not crush or chew.    VITAMIN B COMPLEX ORAL 1 capsule, oral, 2 times daily        Labs and Imaging:     Lab Results   Component Value Date    WBC 6.0 08/30/2023    HGB 15.6 08/30/2023    HCT 46.0 08/30/2023     08/30/2023    CHOL 220 (H) 08/30/2023    TRIG 106 08/30/2023    HDL 55.9 08/30/2023     08/30/2023    K 4.2 08/30/2023     08/30/2023    CREATININE 1.12 08/30/2023    BUN 14 08/30/2023    CO2 29 08/30/2023    TSH 1.41 08/30/2023    HGBA1C 5.7 (A) 08/30/2023         Echocardiogram:   Echocardiogram     [facility], [address] [place] [address]  Tel [phone] and Fax [phone]    TRANSTHORACIC ECHOCARDIOGRAM REPORT      Patient Name:     MENDEL LLANES Reading Physician:   [id number] Leslie Bocanegra MD  Study Date:       9/14/2023       Referring Physician: SUHAIL HUFF  MRN/PID:          [id number]        PCP:                 Jennifer Monzon DO  Accession/Order#: [id number]    Department Location: [facility]  YOB: 1952       Fellow:  Gender:           M               Nurse:  Admit Date:                       Sonographer:         Ekaterina Morrison RVT,  YAEL, RDCS  Admission Status: Outpatient      Additional Staff:  Height:           177.80 cm       CC Report to:        Jennifer Monzon DO  Weight:           102.97 kg       Study Type:          Echocardiogram  BSA:              2.20 m2  Blood Pressure: 134 /87 mmHg    Diagnosis/ICD: I48.91-Unspecified atrial fibrillation  Indication:    A Fib  Procedure/CPT: Echo Complete w Full [id number]    Patient  History:  Pertinent History: A-Fib, Dyslipidemia and Dyspnea. Tachycardia.    Study Detail: The following Echo studies were performed: 2D, M-Mode, Doppler and  color flow. Technically challenging study due to respiratory  interference. The patient was awake.      PHYSICIAN INTERPRETATION:  Left Ventricle: The left ventricular systolic function is normal, with an estimated ejection fraction of 60-65%. The patient is in atrial fibrillation which may influence the estimate of left ventricular function and transvalvular flows. There are no regional wall motion abnormalities. The left ventricular cavity size is normal. Left ventricular diastolic filling was indeterminate.  Left Atrium: The left atrium is mildly dilated.  Right Ventricle: The right ventricle is normal in size. There is normal right ventricular global systolic function.  Right Atrium: The right atrium is upper limits of normal in size.  Aortic Valve: The aortic valve is probably trileaflet. There is mild aortic valve cusp calcification. There is mild to moderate aortic valve thickening. There is evidence of mild aortic valve stenosis.  There is no evidence of aortic valve regurgitation. The peak instantaneous gradient of the aortic valve is 16.6 mmHg.  Mitral Valve: The mitral valve is normal in structure. There is no evidence of mitral valve regurgitation.  Tricuspid Valve: The tricuspid valve is structurally normal. There is mild tricuspid regurgitation.  Pulmonic Valve: The pulmonic valve is not well visualized. There is physiologic pulmonic valve regurgitation.  Pericardium: There is no pericardial effusion noted.  Aorta: The aortic root was not well visualized.      CONCLUSIONS:  1. Left ventricular systolic function is normal with a 60-65% estimated ejection fraction.  2. Mild aortic valve stenosis.  3. The patient is in atrial fibrillation which may influence the estimate of left ventricular function and transvalvular flows.    QUANTITATIVE DATA  61.0 %    LA VOLUME:  Normal Ranges:  LA Vol A4C:        67.2 ml    (22+/-6mL/m2)  LA Vol A2C:        61.9 ml  LA Vol BP:         65.0 ml  LA Vol Index A4C:  30.5ml/m2  LA Vol Index A2C:  28.1 ml/m2  LA Vol Index BP:   29.5 ml/m2  LA Area A4C:       21.6 cm2  LA Area A2C:       20.9 cm2  LA Major Axis A4C: 5.9 cm  LA Major Axis A2C: 6.0 cm  LA Volume Index:   29.5 ml/m2  LA Vol A4C:        63.2 ml  LA Vol A2C:        59.9 ml    RA VOLUME BY A/L METHOD:  Normal Ranges:  RA Vol A4C:        32.4 ml    (8.3-19.5ml)  RA Vol Index A4C:  14.7 ml/m2  RA Area A4C:       14.6 cm2  RA Major Axis A4C: 5.6 cm    M-MODE MEASUREMENTS:  Normal Ranges:  Ao Root: 3.50 cm (2.0-3.7cm)  LAs:     4.90 cm (2.7-4.0cm)    AORTA MEASUREMENTS:  Normal Ranges:  Ao Sinus, d: 2.90 cm (2.1-3.5cm)  Asc Ao, d:   3.20 cm (2.1-3.4cm)    LV SYSTOLIC FUNCTION BY 2D PLANIMETRY (MOD):  Normal Ranges:  EF-A4C View: 64.4 % (>=55%)  EF-A2C View: 65.2 %  EF-Biplane:  65.0 %    LV DIASTOLIC FUNCTION:  Normal Ranges:  MV Peak E:    0.92 m/s (0.7-1.2 m/s)  MV e'         0.12 m/s (>8.0)  MV lateral e' 0.14 m/s  MV medial e'  0.10 m/s  E/e' Ratio:   7.71     (<8.0)    MITRAL VALVE:  Normal Ranges:  MV DT: 136 msec (150-240msec)    AORTIC VALVE:  Normal Ranges:  AoV Vmax:      2.04 m/s  (<=1.7m/s)  AoV Peak P.6 mmHg (<20mmHg)  LVOT Max Severino:  1.00 m/s  (<=1.1m/s)  LVOT VTI:      17.06 cm  LVOT Diameter: 2.02 cm   (1.8-2.4cm)  AoV Area,Vmax: 1.57 cm2  (2.5-4.5cm2)      RIGHT VENTRICLE:  RV Basal 2.80 cm  RV Mid   1.90 cm  RV Major 5.3 cm  TAPSE:   14.0 mm  RV s'    0.11 m/s    TRICUSPID VALVE/RVSP:  Normal Ranges:  Peak TR Velocity: 2.83 m/s  RV Syst Pressure: 35.0 mmHg (< 30mmHg)    PULMONIC  this or any previous visit from the past 1825 days.    Cardiac Catheterization: No results found for this or any previous visit from the past 1825 days.    Cardiac Scoring: No results found for this or any previous visit from the past 1825 days.    AAA : No results found for this or any previous visit from the past 1825 days.    OTHER: No results found for this or any previous visit from the past 1825 days.      The 10-year ASCVD risk score (Bette STILES, et al., 2019) is: 20.2%    Values used to calculate the score:      Age: 71 years      Sex: Male      Is Non- : No      Diabetic: No      Tobacco smoker: No      Systolic Blood Pressure: 132 mmHg      Is BP treated: No      HDL Cholesterol: 55.9 mg/dL      Total Cholesterol: 220 mg/dL     Assessment/Plan:   No diagnosis found.   Gibson Parker 71 y.o. male with PAF, preserved LVEF, obesity follows with emily Anaya to follow up on test results     Echo with preserved EF and mild AS  ZIO with 100% Afib   He has symptomatic afib.. has clavo    Plan  Continue diltaiazem    Continue eliquis   Repeat echo after 3-5 years   Will arrange for DCCV, never missed a dose over the past month   Offered amio and he declines  Refer to sleep clinic   Follow up with Lois Gentile NP after a month prior to colonoscopy end of Dec, consider stress test if symptoms persistent after achievement of NSR     Time Spent: I spent 40 minutes reviewing medical testing, obtaining medical history and counselling and educating on diagnosis and documenting clinical encounter.         ____________________________________________________________  Terell Barlow MD   of Medicine  Division of Cardiovascular  Medicine   [facility] [place]  [facility]

## 2023-12-07 NOTE — ED ADULT TRIAGE NOTE - PATIENT ON (OXYGEN DELIVERY METHOD)
When pt is called about the letter she requested she needs to be made aware her post op visit 2 days after surgery is made with Pedro Luis QUICK at 830 am on 12/29/2023   room air

## 2024-01-05 NOTE — PATIENT PROFILE ADULT - NSPROIMPLANTSMEDDEV_GEN_A_NUR
Procedure Note: 2024    PROCEDURE NAME: Left lumbar transforaminal epidural steroid injection L4/5    Patient Information: Alfa Cast, MRN (from [facility]) [id number], : 1958  Chief Complaint: Left low back and leg pain    Pain Diagnosis: The diagnosis of Lumbar radiculopathy  has been made given the patient's clinical evaluation at prior evaluation.      DESCRIPTION OF PROCEDURE:    Alfa Cast was brought to the procedure room. The assistant obtained vital signs, which were found to be stable. He was then informed of the procedure, its benefits, and its risks, and his questions were answered regarding the procedure. Written informed consent was obtained from the patient. Immediately prior to starting the procedure, a time-out safety check was conducted and the patient's identification, procedure name, and procedure site were confirmed with the patient.    Left L4/5 Transforaminal Epidural Steroid Injection  After informed written consent was obtained, the patient was placed in prone position with a pillow under the abdomen to reduce lumbar lordosis. Monitoring of pulse oximetry, heart rate, and blood pressure was done pre-procedure, and post-procedure. During the procedure the patient was monitored with continuous pulse-ox. A time out was performed before the beginning of the procedure. The correct site and laterality were marked. The skin was prepped with chlorhexidine, allowed to dry for a minimum of 3 minutes, and draped in a sterile fashion     The L4/5 vertebral level was identified with use of fluoroscopy in AP and oblique views. The fluoroscope was obliqued to the left to visualize the neuroforamen and the target in the superior foramen just lateral the inferior articular process was identified and marked. The skin and subcutaneous tissue over this site was anesthetized using 3 ml of 1% lidocaine with a 1.5 inch 25G needle. A 3.5 inch 25G spinal needle was then slowly advanced co-axially until the tip  just entered the foramen. Needle position was confirmed using AP and lateral views as it entered the neuroforamen in a supraneural approach. After negative aspiration for blood or CSF, 1 ml of Omnipaque was injected under live fluoroscopy, with contrast found to spread medially into the epidural space. Next, 10 mg dexamethasone (10 mg/mL) diluted in 1 mL of 1% lidocaine was injected incrementally at each level. The stylet was replaced and the needle was removed      Anesthesia: local anesthesia   Complications: none      Andrzej Stahl MD    None

## 2024-02-02 NOTE — PROGRESS NOTE ADULT - PROBLEM SELECTOR PLAN 7
CARDIOLOGY OFFICE VISIT      CHIEF COMPLAINT      HISTORY OF PRESENT ILLNESS  The patient states she seems to be doing well.  She states that she will have a low heart racing and fluttering sensation once in a while.  I told her to try some over-the-counter magnesium 400 mg a day to see if any improvement in her symptoms.  She denies any ischemic chest discomfort.  She does have some dyspnea unchanged in the past secondary to COPD.  She denies palpitation and syncope.  I did discuss her lipid profile with her.  Her lipids are better than they were but still a bit elevated.  I told her like to get her LDL cholesterol lower.  I am going to increase her atorvastatin to 80 mg a day.  She will let me know if any problems with that.  Her lipids from 2023 demonstrate cholesterol 217, HDL 40, , triglycerides 334.    Impression:  Coronary artery disease, no angina  Hypertension  Hyperlipidemia  Former smoker  COPD, oxygen dependent   Diabetes Mellitus, Type II  Obesity     Please excuse any errors in grammar or translation related to this dictation. Voice recognition software was utilized to prepare this document.       Past Medical History  Past Medical History:   Diagnosis Date    COPD (chronic obstructive pulmonary disease) (CMS/[facility])     Coronary artery disease     Diabetes mellitus (CMS/[facility])     Hypertension        Social History  Social History     Tobacco Use    Smoking status: Former     Packs/day: 1.00     Years: 30.00     Additional pack years: 0.00     Total pack years: 30.00     Types: Cigarettes     Start date: 1981     Quit date: 2022     Years since quittin.1    Smokeless tobacco: Never   Substance Use Topics    Alcohol use: Not Currently     Comment: Quit drinking 6 months ago    Drug use: Never       Family History     Family History   Problem Relation Name Age of Onset    Other (Cardiac disorder) Mother Justyna Moonemilymianel     Other (CVA) Mother Justyna Moongage     Hypertension  Mother Justyna Corley     Other (Diabetes mellitus) Mother Justyna Stottlemire     Heart disease Mother Justyna Stottlemire     Kidney disease Mother Justyna Stottlemire     Stroke Mother Justyna Alcalattlemire     Cancer Father Oswaldo Kniskarina     Heart disease Father Oswaldo Knisley     Hypertension Father Oswaldo Knisley     Lung disease Father Oswaldo Guerraisley     Stroke Father Oswaldo Knisley     Heart disease Maternal Grandmother Sweetie Clay     Kidney disease Maternal Grandmother Sweetie Clay     Cancer Paternal Grandmother Cheyanne Arreaga     Cancer Sister Sunshine McCormick     Depression Sister Sunshine McCormick     Depression Sister Jacquie Carl     Diabetes type II Sister Jacquie Carl     Hypertension Sister Jacquie Carl     Obesity Sister Jacquie Carl     Depression Sister Priya Gage     Diabetes type I Sister Lisa De Leon     Hypertension Sister Lisa De Leon     Thyroid disease Sister Lisa De Leon     Heart attack Sister Sweetie Saenz     Heart disease Sister Sweetie Saenz     Hypertension Sister Sweetie Saenz     Obesity Sister Sweetie Saenz     Hypertension Sister Miley Heuy     Stroke Sister Miley Power         Allergies:  Not on File     Outpatient Medications:  Current Outpatient Medications   Medication Instructions    albuterol (Ventolin HFA) 90 mcg/actuation inhaler INHALE 2 PUFFS BY MOUTH EVERY 4 HOURS    aspirin 81 mg, oral, Daily    atorvastatin (LIPITOR) 40 mg, oral, Daily    cefuroxime (Ceftin) 500 mg tablet 1 tablet, oral, Every 12 hours    codeine-guaifenesin (Robitussin-AC)  mg/5 mL syrup oral    ipratropium-albuteroL (Duo-Neb) 0.5-2.5 mg/3 mL nebulizer solution 3 mL, inhalation, Every 6 hours PRN    levothyroxine (Synthroid, Levoxyl) 88 mcg tablet TAKE 1 TABLET EVERY MORNING BEFORE MEAL    lisinopriL-hydrochlorothiazide 20-25 mg tablet 1 tablet, oral, Daily    metFORMIN XR (GLUCOPHAGE-XR) 500 mg, oral, Daily with breakfast, Do not crush, chew, or split.    metoprolol succinate XL (TOPROL-XL) 25 mg, oral, Daily    Mounjaro  2.5 mg, subcutaneous, Weekly    Mucus Relief  mg 12 hr tablet TAKE 1 OR 2 TABLETS BY MOUTH TWICE DAILY AS NEEDED FOR CONGESTION.    oxygen (O2) gas therapy 2 L, inhalation, As needed    predniSONE (Deltasone) 10 mg tablet     ranolazine (RANEXA) 500 mg, oral, 2 times daily    semaglutide 0.25 mg, subcutaneous, Weekly    sertraline (ZOLOFT) 100 mg, oral, Daily    Trelegy Ellipta 100-62.5-25 mcg blister with device INHALE 1 PUFF BY MOUTH DAILY. RINSE MOUTH AND SPIT AFTER USE.    Trulicity 0.75 mg, subcutaneous, Weekly          REVIEW OF SYSTEMS  Review of Systems   Constitutional:        Portable oxygen concentrator via NC    Cardiovascular:  Positive for palpitations.   All other systems reviewed and are negative.        VITALS  Vitals:    02/02/24 1000   BP: 118/70   Pulse: 60       PHYSICAL EXAM  Vitals reviewed.   Constitutional:       Appearance: Normal and healthy appearance. Well-developed and not in distress.   Eyes:      Conjunctiva/sclera: Conjunctivae normal.      Pupils: Pupils are equal, round, and reactive to light.   Neck:      Vascular: No JVR. JVD normal.   Pulmonary:      Effort: Pulmonary effort is normal.      Breath sounds: Normal breath sounds. No wheezing. No rhonchi. No rales.      Comments: Portable oxygen dependent   Chest:      Chest wall: Not tender to palpatation.   Cardiovascular:      PMI at left midclavicular line. Normal rate. Regular rhythm. Normal S1. Normal S2.       Murmurs: There is no murmur.      No gallop.  No click. No rub.   Pulses:     Intact distal pulses.   Edema:     Peripheral edema absent.   Abdominal:      Tenderness: There is no abdominal tenderness.   Musculoskeletal: Normal range of motion.         General: No tenderness.      Cervical back: Normal range of motion. Skin:     General: Skin is warm and dry.   Neurological:      General: No focal deficit present.      Mental Status: Alert and oriented to person, place and time.   Psychiatric:         Behavior:  Behavior is cooperative.           ASSESSMENT AND PLAN  Diagnoses and all orders for this visit:  Coronary artery disease involving native coronary artery of native heart without angina pectoris  HTN (hypertension), benign  Mixed hyperlipidemia  Type 2 diabetes mellitus without complication, without long-term current use of insulin (CMS/[facility])  COPD, severe (CMS/[facility])  Class 2 obesity with body mass index (BMI) of 35 to 39.9 without comorbidity  Former smoker      [unfilled]   Type 2 DM, endocrinology following   -lantus 14U qhs  -lispro 6U TID pre-meal   -mISS  -f/u discharge regimen per endo    #hypothyroidism   -TSH 2.813; TPO and antithyroid Abs neg   -levothyroxine 50mcg discontinued on 4/29 to assess necessity as there is no evidence of autoimmune hypothyroidism at this time

## 2024-05-14 NOTE — PHYSICAL THERAPY INITIAL EVALUATION ADULT - HEALTH SCREEN CRITERIA
Spoke to wife. She is aware that patient will need labs drawn when he arrives home. She is aware that he will have to contact primary care provider to discuss low testosterone levels. No further questions at this time.    yes

## 2024-10-23 PROBLEM — N40.0 BENIGN PROSTATIC HYPERPLASIA WITHOUT LOWER URINARY TRACT SYMPTOMS: Chronic | Status: ACTIVE | Noted: 2022-02-06

## 2024-10-23 PROBLEM — E03.9 HYPOTHYROIDISM, UNSPECIFIED: Chronic | Status: ACTIVE | Noted: 2022-02-06

## 2024-10-29 VITALS
SYSTOLIC BLOOD PRESSURE: 96 MMHG | OXYGEN SATURATION: 99 % | DIASTOLIC BLOOD PRESSURE: 54 MMHG | HEART RATE: 69 BPM | RESPIRATION RATE: 18 BRPM | TEMPERATURE: 98 F

## 2024-10-29 LAB
ALBUMIN SERPL ELPH-MCNC: 3.5 G/DL — SIGNIFICANT CHANGE UP (ref 3.4–5)
ALP SERPL-CCNC: 162 U/L — HIGH (ref 40–120)
ALT FLD-CCNC: 11 U/L — LOW (ref 12–42)
ANION GAP SERPL CALC-SCNC: 14 MMOL/L — SIGNIFICANT CHANGE UP (ref 9–16)
APTT BLD: 25.3 SEC — SIGNIFICANT CHANGE UP (ref 24.5–35.6)
AST SERPL-CCNC: 12 U/L — LOW (ref 15–37)
BILIRUB SERPL-MCNC: 0.5 MG/DL — SIGNIFICANT CHANGE UP (ref 0.2–1.2)
BUN SERPL-MCNC: 78 MG/DL — HIGH (ref 7–23)
CALCIUM SERPL-MCNC: 8.9 MG/DL — SIGNIFICANT CHANGE UP (ref 8.5–10.5)
CHLORIDE SERPL-SCNC: 94 MMOL/L — LOW (ref 96–108)
CK MB BLD-MCNC: 0.65 % — SIGNIFICANT CHANGE UP
CK MB CFR SERPL CALC: 0.5 NG/ML — SIGNIFICANT CHANGE UP (ref 0.5–3.6)
CK SERPL-CCNC: 77 U/L — SIGNIFICANT CHANGE UP (ref 39–308)
CO2 SERPL-SCNC: 14 MMOL/L — LOW (ref 22–31)
CREAT SERPL-MCNC: 6.47 MG/DL — HIGH (ref 0.5–1.3)
EGFR: 9 ML/MIN/1.73M2 — LOW
ETHANOL SERPL-MCNC: <3 MG/DL — SIGNIFICANT CHANGE UP
GLUCOSE SERPL-MCNC: 358 MG/DL — HIGH (ref 70–99)
HCT VFR BLD CALC: 32.1 % — LOW (ref 39–50)
HGB BLD-MCNC: 11.1 G/DL — LOW (ref 13–17)
INR BLD: 1.05 — SIGNIFICANT CHANGE UP (ref 0.85–1.16)
LACTATE BLDV-MCNC: 0.9 MMOL/L — SIGNIFICANT CHANGE UP (ref 0.5–2)
LIDOCAIN IGE QN: 179 U/L — HIGH (ref 16–77)
MAGNESIUM SERPL-MCNC: 2.3 MG/DL — SIGNIFICANT CHANGE UP (ref 1.6–2.6)
MCHC RBC-ENTMCNC: 30.7 PG — SIGNIFICANT CHANGE UP (ref 27–34)
MCHC RBC-ENTMCNC: 34.6 G/DL — SIGNIFICANT CHANGE UP (ref 32–36)
MCV RBC AUTO: 88.7 FL — SIGNIFICANT CHANGE UP (ref 80–100)
NRBC # BLD: 0 /100 WBCS — SIGNIFICANT CHANGE UP (ref 0–0)
NT-PROBNP SERPL-SCNC: 364 PG/ML — HIGH
PCO2 BLDV: 36 MMHG — LOW (ref 42–55)
PH BLDV: 7.19 — CRITICAL LOW (ref 7.32–7.43)
PLATELET # BLD AUTO: 150 K/UL — SIGNIFICANT CHANGE UP (ref 150–400)
PO2 BLDV: 36 MMHG — SIGNIFICANT CHANGE UP (ref 25–45)
POTASSIUM SERPL-MCNC: 5.5 MMOL/L — HIGH (ref 3.5–5.3)
POTASSIUM SERPL-SCNC: 5.5 MMOL/L — HIGH (ref 3.5–5.3)
PROT SERPL-MCNC: 8.6 G/DL — HIGH (ref 6.4–8.2)
PROTHROM AB SERPL-ACNC: 12.3 SEC — SIGNIFICANT CHANGE UP (ref 9.9–13.4)
RBC # BLD: 3.62 M/UL — LOW (ref 4.2–5.8)
RBC # FLD: 12.7 % — SIGNIFICANT CHANGE UP (ref 10.3–14.5)
SAO2 % BLDV: 66.8 % — LOW (ref 67–88)
SODIUM SERPL-SCNC: 122 MMOL/L — LOW (ref 132–145)
TROPONIN I, HIGH SENSITIVITY RESULT: 14.9 NG/L — SIGNIFICANT CHANGE UP
WBC # BLD: 9.13 K/UL — SIGNIFICANT CHANGE UP (ref 3.8–10.5)
WBC # FLD AUTO: 9.13 K/UL — SIGNIFICANT CHANGE UP (ref 3.8–10.5)

## 2024-10-29 PROCEDURE — 99291 CRITICAL CARE FIRST HOUR: CPT

## 2024-10-29 RX ORDER — ONDANSETRON HYDROCHLORIDE 2 MG/ML
4 INJECTION, SOLUTION INTRAMUSCULAR; INTRAVENOUS ONCE
Refills: 0 | Status: COMPLETED | OUTPATIENT
Start: 2024-10-29 | End: 2024-10-29

## 2024-10-29 RX ORDER — MECLIZINE HCL 25 MG
12.5 TABLET ORAL ONCE
Refills: 0 | Status: COMPLETED | OUTPATIENT
Start: 2024-10-29 | End: 2024-10-29

## 2024-10-29 RX ORDER — SODIUM CHLORIDE 9 MG/ML
1000 INJECTION, SOLUTION INTRAMUSCULAR; INTRAVENOUS; SUBCUTANEOUS ONCE
Refills: 0 | Status: COMPLETED | OUTPATIENT
Start: 2024-10-29 | End: 2024-10-29

## 2024-10-29 RX ORDER — INSULIN REG, HUM S-S BUFF 100/ML
5 VIAL (ML) INJECTION ONCE
Refills: 0 | Status: COMPLETED | OUTPATIENT
Start: 2024-10-29 | End: 2024-10-29

## 2024-10-29 RX ADMIN — Medication 5 UNIT(S): at 23:34

## 2024-10-29 RX ADMIN — SODIUM CHLORIDE 1000 MILLILITER(S): 9 INJECTION, SOLUTION INTRAMUSCULAR; INTRAVENOUS; SUBCUTANEOUS at 22:55

## 2024-10-29 RX ADMIN — ONDANSETRON HYDROCHLORIDE 4 MILLIGRAM(S): 2 INJECTION, SOLUTION INTRAMUSCULAR; INTRAVENOUS at 22:54

## 2024-10-29 RX ADMIN — Medication 12.5 MILLIGRAM(S): at 22:54

## 2024-10-29 NOTE — ED ADULT NURSE NOTE - NS ED NURSE RECORD ANOTHER HT AND WT
-patient without leukocytosis on admission, remained afebrile.  White blood count increased to 17 on 04/17  -likely due to steroids  -continue antibiotics as above  -we will continue to monitor    Yes

## 2024-10-29 NOTE — ED ADULT TRIAGE NOTE - CHIEF COMPLAINT QUOTE
Pt. presents c/o over 24 hours of dizziness, worsening blurry vision, head and neck pain since yesterday morning.

## 2024-10-29 NOTE — ED ADULT NURSE NOTE - OBJECTIVE STATEMENT
Pt presents to ED A&Ox4 with c/o dizziness, blurry vision, nausea, and head/neck pain x24 hrs. Pt reports symptoms have gradually become worse since onset.  Pt also reports episode of nausea/vomiting yesterday. Denies loss of visual field. Denies fever/chills, chest pain, abdominal pain. PMH colostomy, HTN, HLD, DM, hypothyroidism. PERRL.  during assessment; Pt medicated with zofran, IVF, and antivert as per orders. Pt connected to cardiac monitor.

## 2024-10-29 NOTE — ED ADULT NURSE NOTE - NSFALLRISKINTERV_ED_ALL_ED

## 2024-10-29 NOTE — ED ADULT NURSE NOTE - NEURO SENSATION
LVM for pt to call back to schedule Hearing Test for tomorrow 10/18/24 at 1:30pm with Deanne.     sensory intact

## 2024-10-30 ENCOUNTER — INPATIENT (INPATIENT)
Facility: HOSPITAL | Age: 64
LOS: 5 days | Discharge: HOME | DRG: 673 | End: 2024-11-05
Attending: STUDENT IN AN ORGANIZED HEALTH CARE EDUCATION/TRAINING PROGRAM | Admitting: INTERNAL MEDICINE
Payer: SELF-PAY

## 2024-10-30 DIAGNOSIS — Z90.49 ACQUIRED ABSENCE OF OTHER SPECIFIED PARTS OF DIGESTIVE TRACT: Chronic | ICD-10-CM

## 2024-10-30 DIAGNOSIS — Z98.890 OTHER SPECIFIED POSTPROCEDURAL STATES: Chronic | ICD-10-CM

## 2024-10-30 PROBLEM — Z93.3 COLOSTOMY STATUS: Chronic | Status: ACTIVE | Noted: 2022-06-23

## 2024-10-30 PROBLEM — I10 ESSENTIAL (PRIMARY) HYPERTENSION: Chronic | Status: ACTIVE | Noted: 2022-06-23

## 2024-10-30 PROBLEM — E11.9 TYPE 2 DIABETES MELLITUS WITHOUT COMPLICATIONS: Chronic | Status: ACTIVE | Noted: 2022-06-23

## 2024-10-30 LAB
ADD ON TEST-SPECIMEN IN LAB: SIGNIFICANT CHANGE UP
ADD ON TEST-SPECIMEN IN LAB: SIGNIFICANT CHANGE UP
AMPHET UR-MCNC: NEGATIVE — SIGNIFICANT CHANGE UP
ANION GAP SERPL CALC-SCNC: 11 MMOL/L — SIGNIFICANT CHANGE UP (ref 5–17)
ANION GAP SERPL CALC-SCNC: 12 MMOL/L — SIGNIFICANT CHANGE UP (ref 5–17)
ANION GAP SERPL CALC-SCNC: 12 MMOL/L — SIGNIFICANT CHANGE UP (ref 5–17)
APPEARANCE UR: ABNORMAL
APPEARANCE UR: ABNORMAL
B-OH-BUTYR SERPL-SCNC: 0.2 MMOL/L — SIGNIFICANT CHANGE UP
BACTERIA # UR AUTO: ABNORMAL /HPF
BACTERIA # UR AUTO: ABNORMAL /HPF
BARBITURATES UR SCN-MCNC: NEGATIVE — SIGNIFICANT CHANGE UP
BASE EXCESS BLDA CALC-SCNC: -14.2 MMOL/L — LOW (ref -2–3)
BASE EXCESS BLDV CALC-SCNC: -9.9 MMOL/L — LOW (ref -2–3)
BASOPHILS # BLD AUTO: 0.02 K/UL — SIGNIFICANT CHANGE UP (ref 0–0.2)
BASOPHILS NFR BLD AUTO: 0.3 % — SIGNIFICANT CHANGE UP (ref 0–2)
BENZODIAZ UR-MCNC: NEGATIVE — SIGNIFICANT CHANGE UP
BILIRUB UR-MCNC: NEGATIVE — SIGNIFICANT CHANGE UP
BILIRUB UR-MCNC: NEGATIVE — SIGNIFICANT CHANGE UP
BLD GP AB SCN SERPL QL: NEGATIVE — SIGNIFICANT CHANGE UP
BUN SERPL-MCNC: 70 MG/DL — HIGH (ref 7–23)
BUN SERPL-MCNC: 70 MG/DL — HIGH (ref 7–23)
BUN SERPL-MCNC: 71 MG/DL — HIGH (ref 7–23)
CA-I SERPL-SCNC: 1.18 MMOL/L — SIGNIFICANT CHANGE UP (ref 1.15–1.33)
CALCIUM SERPL-MCNC: 8.1 MG/DL — LOW (ref 8.4–10.5)
CALCIUM SERPL-MCNC: 8.2 MG/DL — LOW (ref 8.4–10.5)
CALCIUM SERPL-MCNC: 8.3 MG/DL — LOW (ref 8.4–10.5)
CAST: 1 /LPF — SIGNIFICANT CHANGE UP (ref 0–4)
CHLORIDE SERPL-SCNC: 102 MMOL/L — SIGNIFICANT CHANGE UP (ref 96–108)
CHLORIDE SERPL-SCNC: 102 MMOL/L — SIGNIFICANT CHANGE UP (ref 96–108)
CHLORIDE SERPL-SCNC: 99 MMOL/L — SIGNIFICANT CHANGE UP (ref 96–108)
CO2 BLDA-SCNC: 13 MMOL/L — LOW (ref 19–24)
CO2 BLDV-SCNC: 16 MMOL/L — LOW (ref 22–26)
CO2 SERPL-SCNC: 13 MMOL/L — LOW (ref 22–31)
CO2 SERPL-SCNC: 15 MMOL/L — LOW (ref 22–31)
CO2 SERPL-SCNC: 15 MMOL/L — LOW (ref 22–31)
COCAINE METAB.OTHER UR-MCNC: NEGATIVE — SIGNIFICANT CHANGE UP
COLOR SPEC: YELLOW — SIGNIFICANT CHANGE UP
COLOR SPEC: YELLOW — SIGNIFICANT CHANGE UP
CREAT ?TM UR-MCNC: 63 MG/DL — SIGNIFICANT CHANGE UP
CREAT SERPL-MCNC: 5.62 MG/DL — HIGH (ref 0.5–1.3)
CREAT SERPL-MCNC: 5.72 MG/DL — HIGH (ref 0.5–1.3)
CREAT SERPL-MCNC: 5.79 MG/DL — HIGH (ref 0.5–1.3)
DIFF PNL FLD: ABNORMAL
DIFF PNL FLD: ABNORMAL
EGFR: 10 ML/MIN/1.73M2 — LOW
EGFR: 10 ML/MIN/1.73M2 — LOW
EGFR: 11 ML/MIN/1.73M2 — LOW
EOSINOPHIL # BLD AUTO: 0.05 K/UL — SIGNIFICANT CHANGE UP (ref 0–0.5)
EOSINOPHIL NFR BLD AUTO: 0.7 % — SIGNIFICANT CHANGE UP (ref 0–6)
FENTANYL UR QL SCN: NEGATIVE — SIGNIFICANT CHANGE UP
GAS PNL BLDA: SIGNIFICANT CHANGE UP
GAS PNL BLDV: 127 MMOL/L — LOW (ref 136–145)
GAS PNL BLDV: SIGNIFICANT CHANGE UP
GGT SERPL-CCNC: 18 U/L — SIGNIFICANT CHANGE UP (ref 9–50)
GLUCOSE BLDC GLUCOMTR-MCNC: 233 MG/DL — HIGH (ref 70–99)
GLUCOSE BLDC GLUCOMTR-MCNC: 291 MG/DL — HIGH (ref 70–99)
GLUCOSE BLDC GLUCOMTR-MCNC: 306 MG/DL — HIGH (ref 70–99)
GLUCOSE BLDC GLUCOMTR-MCNC: 318 MG/DL — HIGH (ref 70–99)
GLUCOSE BLDC GLUCOMTR-MCNC: 346 MG/DL — HIGH (ref 70–99)
GLUCOSE BLDC GLUCOMTR-MCNC: 394 MG/DL — HIGH (ref 70–99)
GLUCOSE SERPL-MCNC: 270 MG/DL — HIGH (ref 70–99)
GLUCOSE SERPL-MCNC: 282 MG/DL — HIGH (ref 70–99)
GLUCOSE SERPL-MCNC: 378 MG/DL — HIGH (ref 70–99)
GLUCOSE UR QL: 100 MG/DL
GLUCOSE UR QL: 250 MG/DL
HCO3 BLDA-SCNC: 12 MMOL/L — LOW (ref 21–28)
HCO3 BLDV-SCNC: 15 MMOL/L — LOW (ref 22–29)
HCT VFR BLD CALC: 26.3 % — LOW (ref 39–50)
HGB BLD-MCNC: 9.3 G/DL — LOW (ref 13–17)
IMM GRANULOCYTES NFR BLD AUTO: 0.4 % — SIGNIFICANT CHANGE UP (ref 0–0.9)
KETONES UR-MCNC: NEGATIVE MG/DL — SIGNIFICANT CHANGE UP
KETONES UR-MCNC: NEGATIVE MG/DL — SIGNIFICANT CHANGE UP
LEUKOCYTE ESTERASE UR-ACNC: ABNORMAL
LEUKOCYTE ESTERASE UR-ACNC: ABNORMAL
LYMPHOCYTES # BLD AUTO: 1.06 K/UL — SIGNIFICANT CHANGE UP (ref 1–3.3)
LYMPHOCYTES # BLD AUTO: 15.7 % — SIGNIFICANT CHANGE UP (ref 13–44)
MAGNESIUM SERPL-MCNC: 2.1 MG/DL — SIGNIFICANT CHANGE UP (ref 1.6–2.6)
MCHC RBC-ENTMCNC: 30.5 PG — SIGNIFICANT CHANGE UP (ref 27–34)
MCHC RBC-ENTMCNC: 35.4 G/DL — SIGNIFICANT CHANGE UP (ref 32–36)
MCV RBC AUTO: 86.2 FL — SIGNIFICANT CHANGE UP (ref 80–100)
METHADONE UR-MCNC: NEGATIVE — SIGNIFICANT CHANGE UP
MONOCYTES # BLD AUTO: 0.85 K/UL — SIGNIFICANT CHANGE UP (ref 0–0.9)
MONOCYTES NFR BLD AUTO: 12.6 % — SIGNIFICANT CHANGE UP (ref 2–14)
NEUTROPHILS # BLD AUTO: 4.75 K/UL — SIGNIFICANT CHANGE UP (ref 1.8–7.4)
NEUTROPHILS NFR BLD AUTO: 70.3 % — SIGNIFICANT CHANGE UP (ref 43–77)
NITRITE UR-MCNC: NEGATIVE — SIGNIFICANT CHANGE UP
NITRITE UR-MCNC: NEGATIVE — SIGNIFICANT CHANGE UP
NRBC # BLD: 0 /100 WBCS — SIGNIFICANT CHANGE UP (ref 0–0)
OPIATES UR-MCNC: NEGATIVE — SIGNIFICANT CHANGE UP
OSMOLALITY SERPL: 301 MOSM/KG — SIGNIFICANT CHANGE UP (ref 280–301)
OSMOLALITY UR: 225 MOSM/KG — LOW (ref 300–900)
PCO2 BLDA: 31 MMHG — LOW (ref 35–48)
PCO2 BLDV: 31 MMHG — LOW (ref 42–55)
PCP SPEC-MCNC: SIGNIFICANT CHANGE UP
PCP UR-MCNC: NEGATIVE — SIGNIFICANT CHANGE UP
PH BLDA: 7.21 — LOW (ref 7.35–7.45)
PH BLDV: 7.3 — LOW (ref 7.32–7.43)
PH UR: 5.5 — SIGNIFICANT CHANGE UP (ref 5–8)
PH UR: 6 — SIGNIFICANT CHANGE UP (ref 5–8)
PHOSPHATE SERPL-MCNC: 3.7 MG/DL — SIGNIFICANT CHANGE UP (ref 2.5–4.5)
PLATELET # BLD AUTO: 124 K/UL — LOW (ref 150–400)
PO2 BLDA: 123 MMHG — HIGH (ref 83–108)
PO2 BLDV: 80 MMHG — HIGH (ref 25–45)
POTASSIUM BLDV-SCNC: 4.9 MMOL/L — SIGNIFICANT CHANGE UP (ref 3.5–5.1)
POTASSIUM SERPL-MCNC: 4.1 MMOL/L — SIGNIFICANT CHANGE UP (ref 3.5–5.3)
POTASSIUM SERPL-MCNC: 4.8 MMOL/L — SIGNIFICANT CHANGE UP (ref 3.5–5.3)
POTASSIUM SERPL-MCNC: 5 MMOL/L — SIGNIFICANT CHANGE UP (ref 3.5–5.3)
POTASSIUM SERPL-SCNC: 4.1 MMOL/L — SIGNIFICANT CHANGE UP (ref 3.5–5.3)
POTASSIUM SERPL-SCNC: 4.8 MMOL/L — SIGNIFICANT CHANGE UP (ref 3.5–5.3)
POTASSIUM SERPL-SCNC: 5 MMOL/L — SIGNIFICANT CHANGE UP (ref 3.5–5.3)
PROT ?TM UR-MCNC: 38 MG/DL — HIGH (ref 0–12)
PROT UR-MCNC: 100 MG/DL
PROT UR-MCNC: 30 MG/DL
PROT/CREAT UR-RTO: 0.6 RATIO — HIGH (ref 0–0.2)
RBC # BLD: 3.05 M/UL — LOW (ref 4.2–5.8)
RBC # FLD: 12.5 % — SIGNIFICANT CHANGE UP (ref 10.3–14.5)
RBC CASTS # UR COMP ASSIST: 0 /HPF — SIGNIFICANT CHANGE UP (ref 0–4)
RBC CASTS # UR COMP ASSIST: 20 /HPF — HIGH (ref 0–4)
RH IG SCN BLD-IMP: POSITIVE — SIGNIFICANT CHANGE UP
SAO2 % BLDA: 98.7 % — HIGH (ref 94–98)
SAO2 % BLDV: 97.7 % — HIGH (ref 67–88)
SODIUM SERPL-SCNC: 125 MMOL/L — LOW (ref 135–145)
SODIUM SERPL-SCNC: 127 MMOL/L — LOW (ref 135–145)
SODIUM SERPL-SCNC: 129 MMOL/L — LOW (ref 135–145)
SODIUM UR-SCNC: <20 MMOL/L — SIGNIFICANT CHANGE UP
SP GR SPEC: 1.01 — SIGNIFICANT CHANGE UP (ref 1–1.03)
SP GR SPEC: 1.01 — SIGNIFICANT CHANGE UP (ref 1–1.03)
THC UR QL: NEGATIVE — SIGNIFICANT CHANGE UP
UROBILINOGEN FLD QL: 0.2 MG/DL — SIGNIFICANT CHANGE UP (ref 0.2–1)
UROBILINOGEN FLD QL: 0.2 MG/DL — SIGNIFICANT CHANGE UP (ref 0.2–1)
UUN UR-MCNC: 325 MG/DL — SIGNIFICANT CHANGE UP
WBC # BLD: 6.76 K/UL — SIGNIFICANT CHANGE UP (ref 3.8–10.5)
WBC # FLD AUTO: 6.76 K/UL — SIGNIFICANT CHANGE UP (ref 3.8–10.5)
WBC UR QL: 189 /HPF — HIGH (ref 0–5)
WBC UR QL: >100 /HPF — HIGH (ref 0–5)

## 2024-10-30 PROCEDURE — 71045 X-RAY EXAM CHEST 1 VIEW: CPT | Mod: 26

## 2024-10-30 PROCEDURE — 74176 CT ABD & PELVIS W/O CONTRAST: CPT | Mod: 26,MC

## 2024-10-30 PROCEDURE — 99222 1ST HOSP IP/OBS MODERATE 55: CPT | Mod: GC

## 2024-10-30 PROCEDURE — 70450 CT HEAD/BRAIN W/O DYE: CPT | Mod: 26,MC

## 2024-10-30 RX ORDER — ACETAMINOPHEN 500 MG
650 TABLET ORAL ONCE
Refills: 0 | Status: COMPLETED | OUTPATIENT
Start: 2024-10-30 | End: 2024-10-30

## 2024-10-30 RX ORDER — INSULIN GLARGINE,HUM.REC.ANLOG 100/ML
10 VIAL (ML) SUBCUTANEOUS AT BEDTIME
Refills: 0 | Status: DISCONTINUED | OUTPATIENT
Start: 2024-10-30 | End: 2024-10-31

## 2024-10-30 RX ORDER — INSULIN GLARGINE,HUM.REC.ANLOG 100/ML
5 VIAL (ML) SUBCUTANEOUS AT BEDTIME
Refills: 0 | Status: DISCONTINUED | OUTPATIENT
Start: 2024-10-30 | End: 2024-10-30

## 2024-10-30 RX ORDER — SODIUM CHLORIDE 9 MG/ML
1000 INJECTION, SOLUTION INTRAMUSCULAR; INTRAVENOUS; SUBCUTANEOUS ONCE
Refills: 0 | Status: COMPLETED | OUTPATIENT
Start: 2024-10-30 | End: 2024-10-30

## 2024-10-30 RX ORDER — TRAZODONE HYDROCHLORIDE 100 MG/1
100 TABLET ORAL AT BEDTIME
Refills: 0 | Status: DISCONTINUED | OUTPATIENT
Start: 2024-10-30 | End: 2024-10-31

## 2024-10-30 RX ORDER — INSULIN GLARGINE,HUM.REC.ANLOG 100/ML
5 VIAL (ML) SUBCUTANEOUS EVERY MORNING
Refills: 0 | Status: DISCONTINUED | OUTPATIENT
Start: 2024-10-30 | End: 2024-10-30

## 2024-10-30 RX ORDER — INFLUENZ VIR VAC TV P-SURF2003 15MCG/.5ML
0.5 SYRINGE (ML) INTRAMUSCULAR ONCE
Refills: 0 | Status: DISCONTINUED | OUTPATIENT
Start: 2024-10-30 | End: 2024-11-05

## 2024-10-30 RX ORDER — SODIUM BICARBONATE 1 MEQ/ML
0.22 VIAL (ML) INTRAVENOUS
Qty: 150 | Refills: 0 | Status: DISCONTINUED | OUTPATIENT
Start: 2024-10-30 | End: 2024-10-30

## 2024-10-30 RX ORDER — ACETAMINOPHEN 500 MG
650 TABLET ORAL EVERY 6 HOURS
Refills: 0 | Status: DISCONTINUED | OUTPATIENT
Start: 2024-10-30 | End: 2024-11-05

## 2024-10-30 RX ORDER — INSULIN LISPRO 100/ML
VIAL (ML) SUBCUTANEOUS
Refills: 0 | Status: DISCONTINUED | OUTPATIENT
Start: 2024-10-30 | End: 2024-11-05

## 2024-10-30 RX ORDER — INSULIN LISPRO 100/ML
4 VIAL (ML) SUBCUTANEOUS
Refills: 0 | Status: DISCONTINUED | OUTPATIENT
Start: 2024-10-30 | End: 2024-10-31

## 2024-10-30 RX ORDER — NIFEDIPINE 90 MG
60 TABLET, EXTENDED RELEASE 24 HR ORAL DAILY
Refills: 0 | Status: DISCONTINUED | OUTPATIENT
Start: 2024-10-31 | End: 2024-10-31

## 2024-10-30 RX ORDER — LEVOTHYROXINE SODIUM 88 MCG
50 TABLET ORAL DAILY
Refills: 0 | Status: DISCONTINUED | OUTPATIENT
Start: 2024-10-30 | End: 2024-11-05

## 2024-10-30 RX ORDER — HEPARIN SODIUM 10000 [USP'U]/ML
5000 INJECTION INTRAVENOUS; SUBCUTANEOUS EVERY 8 HOURS
Refills: 0 | Status: DISCONTINUED | OUTPATIENT
Start: 2024-10-30 | End: 2024-10-31

## 2024-10-30 RX ORDER — GLUCAGON INJECTION, SOLUTION 1 MG/.2ML
1 INJECTION, SOLUTION SUBCUTANEOUS ONCE
Refills: 0 | Status: DISCONTINUED | OUTPATIENT
Start: 2024-10-30 | End: 2024-11-05

## 2024-10-30 RX ORDER — TAMSULOSIN HCL 0.4 MG
0.4 CAPSULE ORAL AT BEDTIME
Refills: 0 | Status: DISCONTINUED | OUTPATIENT
Start: 2024-10-31 | End: 2024-11-05

## 2024-10-30 RX ADMIN — Medication 650 MILLIGRAM(S): at 10:46

## 2024-10-30 RX ADMIN — Medication 100 MEQ/KG/HR: at 06:16

## 2024-10-30 RX ADMIN — HEPARIN SODIUM 5000 UNIT(S): 10000 INJECTION INTRAVENOUS; SUBCUTANEOUS at 06:34

## 2024-10-30 RX ADMIN — Medication 10: at 17:02

## 2024-10-30 RX ADMIN — Medication 4: at 07:30

## 2024-10-30 RX ADMIN — Medication 650 MILLIGRAM(S): at 12:09

## 2024-10-30 RX ADMIN — Medication 4 UNIT(S): at 12:18

## 2024-10-30 RX ADMIN — Medication 100 MILLILITER(S): at 13:26

## 2024-10-30 RX ADMIN — HEPARIN SODIUM 5000 UNIT(S): 10000 INJECTION INTRAVENOUS; SUBCUTANEOUS at 13:15

## 2024-10-30 RX ADMIN — SODIUM CHLORIDE 1000 MILLILITER(S): 9 INJECTION, SOLUTION INTRAMUSCULAR; INTRAVENOUS; SUBCUTANEOUS at 01:30

## 2024-10-30 RX ADMIN — Medication 8: at 12:18

## 2024-10-30 RX ADMIN — Medication 8: at 22:44

## 2024-10-30 RX ADMIN — Medication 650 MILLIGRAM(S): at 17:44

## 2024-10-30 RX ADMIN — Medication 650 MILLIGRAM(S): at 17:47

## 2024-10-30 RX ADMIN — Medication 10 MILLIGRAM(S): at 22:14

## 2024-10-30 RX ADMIN — Medication 10 UNIT(S): at 22:18

## 2024-10-30 RX ADMIN — Medication 4 UNIT(S): at 17:02

## 2024-10-30 RX ADMIN — HEPARIN SODIUM 5000 UNIT(S): 10000 INJECTION INTRAVENOUS; SUBCUTANEOUS at 22:15

## 2024-10-30 RX ADMIN — Medication 50 MICROGRAM(S): at 19:26

## 2024-10-30 NOTE — ED PROVIDER NOTE - CARE PLAN
Principal Discharge DX:	Hyperglycemia  Secondary Diagnosis:	Acidemia  Secondary Diagnosis:	Acute renal failure (ARF)  Secondary Diagnosis:	Stage 3 chronic kidney disease  Secondary Diagnosis:	Acute weakness  Secondary Diagnosis:	Lightheadedness   1 Principal Discharge DX:	Hyperglycemia  Secondary Diagnosis:	Acidemia  Secondary Diagnosis:	Acute renal failure (ARF)  Secondary Diagnosis:	Stage 3 chronic kidney disease  Secondary Diagnosis:	Acute weakness  Secondary Diagnosis:	Lightheadedness  Secondary Diagnosis:	Hyperkalemia

## 2024-10-30 NOTE — H&P ADULT - NSHPLABSRESULTS_GEN_ALL_CORE
.  LABS:                         9.3    6.76  )-----------( 124      ( 30 Oct 2024 05:24 )             26.3     10-29    122[L]  |  94[L]  |  78[H]  ----------------------------<  358[H]  5.5[H]   |  14[L]  |  6.47[H]    Ca    8.9      29 Oct 2024 22:49  Mg     2.3     10-29    TPro  8.6[H]  /  Alb  3.5  /  TBili  0.5  /  DBili  x   /  AST  12[L]  /  ALT  11[L]  /  AlkPhos  162[H]  10-29    PT/INR - ( 29 Oct 2024 22:49 )   PT: 12.3 sec;   INR: 1.05          PTT - ( 29 Oct 2024 22:49 )  PTT:25.3 sec  Urinalysis Basic - ( 29 Oct 2024 22:49 )    Color: Yellow / Appearance: Turbid / S.011 / pH: x  Gluc: 358 mg/dL / Ketone: Negative mg/dL  / Bili: Negative / Urobili: 0.2 mg/dL   Blood: x / Protein: 100 mg/dL / Nitrite: Negative   Leuk Esterase: Large / RBC: 20 /HPF / WBC >100 /HPF   Sq Epi: x / Non Sq Epi: x / Bacteria: Moderate /HPF      CARDIAC MARKERS ( 29 Oct 2024 22:49 )  x     / x     / x     / x     / 0.5 ng/mL        RADIOLOGY, EKG & ADDITIONAL TESTS: Reviewed.

## 2024-10-30 NOTE — PHYSICAL THERAPY INITIAL EVALUATION ADULT - PERTINENT HX OF CURRENT PROBLEM, REHAB EVAL
64 M pmhx HTN, hypothyroidism, HLD, T2DM, CKD 3 (baseline Cr 2.3-2.5), colostomy, toxic megacolon, EtOH use disorder, cirrhosis (?), presented for weakness and found to have HAGMA 2/2 MEG on CKD likely 2/2 poor PO intake, admitted to [facility] for bicarbonate drip, now improving s/p IV fluids.

## 2024-10-30 NOTE — ED PROVIDER NOTE - NSICDXPASTMEDICALHX_GEN_ALL_CORE_FT
PAST MEDICAL HISTORY:  Anemia     BPH (benign prostatic hyperplasia)     Colostomy present     DM (diabetes mellitus)     ETOH abuse     HLD (hyperlipidemia)     HTN (hypertension)     HTN (hypertension)     Hypothyroidism     T2DM (type 2 diabetes mellitus)

## 2024-10-30 NOTE — PATIENT PROFILE ADULT - NSPRESCRALCAMT_GEN_A_NUR
CHIEF COMPLAINT:    Interval Events:    REVIEW OF SYSTEMS:  Constitutional: [ ] fevers [ ] chills [ ] weight loss [ ] weight gain  HEENT: [ ] dry eyes [ ] eye irritation [ ] postnasal drip [ ] nasal congestion  CV: [ ] chest pain [ ] orthopnea [ ] palpitations [ ] murmur  Resp: [ ] cough [ ] shortness of breath [ ] dyspnea [ ] wheezing [ ] sputum [ ] hemoptysis  GI: [ ] nausea [ ] vomiting [ ] diarrhea [ ] constipation [ ] abd pain [ ] dysphagia   : [ ] dysuria [ ] nocturia [ ] hematuria [ ] increased urinary frequency  Musculoskeletal: [ ] back pain [ ] myalgias [ ] arthralgias [ ] fracture  Skin: [ ] rash [ ] itch  Neurological: [ ] headache [ ] dizziness [ ] syncope [ ] weakness [ ] numbness  Psychiatric: [ ] anxiety [ ] depression  Endocrine: [ ] diabetes [ ] thyroid problem  Hematologic/Lymphatic: [ ] anemia [ ] bleeding problem  Allergic/Immunologic: [ ] itchy eyes [ ] nasal discharge [ ] hives [ ] angioedema  [ ] All other systems negative  [ ] Unable to assess ROS because ________    OBJECTIVE:  ICU Vital Signs Last 24 Hrs  T(C): 36.5 (15 Apr 2023 08:00), Max: 36.8 (2023 16:00)  T(F): 97.7 (15 Apr 2023 08:00), Max: 98.3 (2023 16:00)  HR: 80 (15 Apr 2023 08:00) (80 - 224)  BP: 144/69 (15 Apr 2023 08:00) (109/60 - 144/69)  BP(mean): 96 (15 Apr 2023 08:00) (77 - 101)  ABP: --  ABP(mean): --  RR: 25 (15 Apr 2023 08:00) (14 - 36)  SpO2: 98% (15 Apr 2023 08:00) (90% - 100%)    O2 Parameters below as of 15 Apr 2023 08:00  Patient On (Oxygen Delivery Method): nasal cannula            I & O's    -14 @ 07:01  -  -15 @ 07:00  --------------------------------------------------------  IN: 1529.8 mL / OUT: 2560 mL / NET: -1030.2 mL      CAPILLARY BLOOD GLUCOSE      POCT Blood Glucose.: 255 mg/dL (2023 22:30)      PHYSICAL EXAM:  General:   HEENT:   Lymph Nodes:  Neck:   Respiratory:   Cardiovascular:   Abdomen:   Extremities:   Skin:   Neurological:  Psychiatry:    LINES:    HOSPITAL MEDICATIONS:  MEDICATIONS  (STANDING):  chlorhexidine 4% Liquid 1 Application(s) Topical <User Schedule>  dextrose 5%. 1000 milliLiter(s) (100 mL/Hr) IV Continuous <Continuous>  dextrose 5%. 1000 milliLiter(s) (50 mL/Hr) IV Continuous <Continuous>  dextrose 50% Injectable 25 Gram(s) IV Push once  dextrose 50% Injectable 12.5 Gram(s) IV Push once  dextrose 50% Injectable 25 Gram(s) IV Push once  glucagon  Injectable 1 milliGRAM(s) IntraMuscular once  heparin   Injectable 5000 Unit(s) SubCutaneous every 8 hours  insulin glargine Injectable (LANTUS) 5 Unit(s) SubCutaneous at bedtime  insulin lispro (ADMELOG) corrective regimen sliding scale   SubCutaneous three times a day before meals  insulin lispro (ADMELOG) corrective regimen sliding scale   SubCutaneous at bedtime  lidocaine   4% Patch 1 Patch Transdermal every 24 hours  midodrine 10 milliGRAM(s) Oral every 8 hours  norepinephrine Infusion 0.14 MICROgram(s)/kG/Min (18.8 mL/Hr) IV Continuous <Continuous>  nystatin Powder 1 Application(s) Topical every 8 hours  octreotide  Infusion 50 MICROgram(s)/Hr (10 mL/Hr) IV Continuous <Continuous>  piperacillin/tazobactam IVPB.. 3.375 Gram(s) IV Intermittent every 8 hours  polyethylene glycol 3350 17 Gram(s) Oral daily  sodium bicarbonate 1300 milliGRAM(s) Oral three times a day    MEDICATIONS  (PRN):  dextrose Oral Gel 15 Gram(s) Oral once PRN Blood Glucose LESS THAN 70 milliGRAM(s)/deciliter      LABS:                        9.7    4.08  )-----------( 125      ( 15 Apr 2023 00:18 )             27.9     Hgb Trend: 9.7<--, 10.2<--, 10.2<--  04-15    127<L>  |  x   |  x   ----------------------------<  x   x    |  x   |  1.73<H>    Ca    8.4      15 Apr 2023 00:18  Phos  3.4     04-15  Mg     2.0     04-15    TPro  x   /  Alb  x   /  TBili  0.8  /  DBili  x   /  AST  x   /  ALT  x   /  AlkPhos  x   04-15    Creatinine Trend: 1.73<--, 1.87<--, 2.45<--, 2.93<--, 3.06<--, 3.12<--  PT/INR - ( 15 Apr 2023 02:39 )   PT: 15.5 sec;   INR: 1.34 ratio         PTT - ( 15 Apr 2023 00:18 )  PTT:32.7 sec  Urinalysis Basic - ( 2023 11:55 )    Color: Yellow / Appearance: Slightly Turbid / S.022 / pH: x  Gluc: x / Ketone: Trace  / Bili: Small / Urobili: 2 mg/dL   Blood: x / Protein: 30 mg/dL / Nitrite: Negative   Leuk Esterase: Moderate / RBC: 3 /hpf / WBC 5 /HPF   Sq Epi: x / Non Sq Epi: x / Bacteria: Negative        Venous Blood Gas:  04-15 @ 00:02  7.37/39/44/22/74.6  VBG Lactate: 1.6  Venous Blood Gas:   @ 00:00  7.33/38/39/20/61.9  VBG Lactate: 1.9  Venous Blood Gas:   @ 16:55  7.32/42/19/22/19.9  VBG Lactate: 1.7  Venous Blood Gas:   @ 16:40  7.32/42/24/22/28.9  VBG Lactate: 1.5  Venous Blood Gas:   @ 16:31  --/--/--/--/--  VBG Lactate: 1.5  Venous Blood Gas:   @ 16:19  --/--/--/--/--  VBG Lactate: 1.3  Venous Blood Gas:   @ 09:07  7.33/40/21/21/28.9  VBG Lactate: 1.9      MICROBIOLOGY:     RADIOLOGY:  [ ] Reviewed and interpreted by me    EKG: CHIEF COMPLAINT: Symptomatic Hypotension 2/2 Hypovolemic Shock in the setting s/p large volume ascites fluid removal, Decompensated Liver Cirrhosis, HRS, and [place] on CRI    Interval Events: No overnight events    REVIEW OF SYSTEMS:  Constitutional: [ ] fevers [ ] chills [ ] weight loss [ ] weight gain (x) denies  HEENT: [ ] dry eyes [ ] eye irritation [ ] postnasal drip [ ] nasal congestion (x) denies  CV: [ ] chest pain [ ] orthopnea [ ] palpitations [ ] murmur (x) denies  Resp: [ ] cough [x ] shortness of breath [ x] dyspnea [ ] wheezing [ ] sputum [ ] hemoptysis  GI: [ ] nausea [ ] vomiting [ ] diarrhea [ ] constipation [x ] abd pain [ ] dysphagia   : [ ] dysuria [ ] nocturia [ ] hematuria [ ] increased urinary frequency (x) denies  Musculoskeletal: [ ] back pain [ ] myalgias [ ] arthralgias [ ] fracture (x) denies  Skin: [ ] rash [ ] itch (x) denies  Neurological: [ ] headache [ ] dizziness [ ] syncope [x ] weakness [ ] numbness   Psychiatric: [ ] anxiety [ ] depression (x) denies  Endocrine: [ ] diabetes [ ] thyroid problem (x) denies  Hematologic/Lymphatic: [ ] anemia [ ] bleeding problem (x) denies  Allergic/Immunologic: [ ] itchy eyes [ ] nasal discharge [ ] hives [ ] angioedema (x) denies  [ x] All other systems - generalized weakness    OBJECTIVE:  ICU Vital Signs Last 24 Hrs  T(C): 36.5 (15 Apr 2023 08:00), Max: 36.8 (2023 16:00)  T(F): 97.7 (15 Apr 2023 08:00), Max: 98.3 (2023 16:00)  HR: 80 (15 Apr 2023 08:00) (80 - 224)  BP: 144/69 (15 Apr 2023 08:00) (109/60 - 144/69)  BP(mean): 96 (15 Apr 2023 08:00) (77 - 101)  ABP: --  ABP(mean): --  RR: 25 (15 Apr 2023 08:00) (14 - 36)  SpO2: 98% (15 Apr 2023 08:00) (90% - 100%)    O2 Parameters below as of 15 Apr 2023 08:00  Patient On (Oxygen Delivery Method): nasal cannula    I & O's  -14 @ 07:01  -  04-15 @ 07:00  --------------------------------------------------------  IN: 1529.8 mL / OUT: 2560 mL / NET: -1030.2 mL      CAPILLARY BLOOD GLUCOSE  POCT Blood Glucose.: 255 mg/dL (2023 22:30)    PHYSICAL EXAM:  General: Relaxed w/o any over S&S of distress  HEENT: normocephalic, sclera slightly icteric, oral mucosa pinkish / moist, trachea midline   Lymph Nodes: non plap  Neck: supple, neg JVD  Respiratory: Jr CTA, oxygenation status intact on RA  Cardiovascular: S1S2 RRR / paced w/o ectopy, all pulses palp 2+/4  Abdomen: ascites, large / semi firm, tense  Extremities: neg edema  Skin: +jaundice  Neurological: +PERRL, EOMI, speech is clear, TAFOYA- weak / +baseline tremor- intentional tremor  Psychiatry: calm, pleasant    LINES: [facility] MEDICATIONS:  MEDICATIONS  (STANDING):  chlorhexidine 4% Liquid 1 Application(s) Topical <User Schedule>  dextrose 5%. 1000 milliLiter(s) (100 mL/Hr) IV Continuous <Continuous>  dextrose 5%. 1000 milliLiter(s) (50 mL/Hr) IV Continuous <Continuous>  dextrose 50% Injectable 25 Gram(s) IV Push once  dextrose 50% Injectable 12.5 Gram(s) IV Push once  dextrose 50% Injectable 25 Gram(s) IV Push once  glucagon  Injectable 1 milliGRAM(s) IntraMuscular once  heparin   Injectable 5000 Unit(s) SubCutaneous every 8 hours  insulin glargine Injectable (LANTUS) 5 Unit(s) SubCutaneous at bedtime  insulin lispro (ADMELOG) corrective regimen sliding scale   SubCutaneous three times a day before meals  insulin lispro (ADMELOG) corrective regimen sliding scale   SubCutaneous at bedtime  lidocaine   4% Patch 1 Patch Transdermal every 24 hours  midodrine 10 milliGRAM(s) Oral every 8 hours  norepinephrine Infusion 0.14 MICROgram(s)/kG/Min (18.8 mL/Hr) IV Continuous <Continuous>  nystatin Powder 1 Application(s) Topical every 8 hours  octreotide  Infusion 50 MICROgram(s)/Hr (10 mL/Hr) IV Continuous <Continuous>  piperacillin/tazobactam IVPB.. 3.375 Gram(s) IV Intermittent every 8 hours  polyethylene glycol 3350 17 Gram(s) Oral daily  sodium bicarbonate 1300 milliGRAM(s) Oral three times a day    MEDICATIONS  (PRN):  dextrose Oral Gel 15 Gram(s) Oral once PRN Blood Glucose LESS THAN 70 milliGRAM(s)/deciliter      LABS:                        9.7    4.08  )-----------( 125      ( 15 Apr 2023 00:18 )             27.9     Hgb Trend: 9.7<--, 10.2<--, 10.2<--  -15    127<L>  |  x   |  x   ----------------------------<  x   x    |  x   |  1.73<H>    Ca    8.4      15 Apr 2023 00:18  Phos  3.4     -15  Mg     2.0     15    TPro  x   /  Alb  x   /  TBili  0.8  /  DBili  x   /  AST  x   /  ALT  x   /  AlkPhos  x   -15    Creatinine Trend: 1.73<--, 1.87<--, 2.45<--, 2.93<--, 3.06<--, 3.12<--  PT/INR - ( 15 Apr 2023 02:39 )   PT: 15.5 sec;   INR: 1.34 ratio         PTT - ( 15 Apr 2023 00:18 )  PTT:32.7 sec  Urinalysis Basic - ( 2023 11:55 )    Color: Yellow / Appearance: Slightly Turbid / S.022 / pH: x  Gluc: x / Ketone: Trace  / Bili: Small / Urobili: 2 mg/dL   Blood: x / Protein: 30 mg/dL / Nitrite: Negative   Leuk Esterase: Moderate / RBC: 3 /hpf / WBC 5 /HPF   Sq Epi: x / Non Sq Epi: x / Bacteria: Negative        Venous Blood Gas:  04-15 @ 00:02  7.37/39/44/22/74.6  VBG Lactate: 1.6  Venous Blood Gas:   @ 00:00  7.33/38/39/20/61.9  VBG Lactate: 1.9  Venous Blood Gas:   @ 16:55  7.32/42/19/22/19.9  VBG Lactate: 1.7  Venous Blood Gas:   @ 16:40  7.32/42/24/22/28.9  VBG Lactate: 1.5  Venous Blood Gas:   @ 16:31  --/--/--/--/--  VBG Lactate: 1.5  Venous Blood Gas:   @ 16:19  --/--/--/--/--  VBG Lactate: 1.3  Venous Blood Gas:   @ 09:07  7.33/40/21/21/28.9  VBG Lactate: 1.9      MICROBIOLOGY:     Culture - Fungal, Body Fluid (collected 2023 11:08)  Source: Peritoneal Peritoneal Fluid  Preliminary Report (2023 11:42):    Testing in progress    Culture - Body Fluid with Gram Stain (collected 2023 11:08)  Source: Peritoneal Peritoneal Fluid  Gram Stain (2023 19:05):    polymorphonuclear leukocytes seen    Red blood cells seen    No organisms seen    by cytocentrifuge  Preliminary Report (2023 11:34):    No growth to date.    Culture - Blood (collected 2023 09:19)  Source: .Blood Blood-Venous  Preliminary Report (2023 18:01):    No growth to date.    Culture - Blood (collected 2023 09:00)  Source: .Blood Blood-Peripheral  Preliminary Report (2023 18:01):    No growth to date.      RADIOLOGY:  < from: CT Chest No Cont (23 @ 14:51) >  PROCEDURE DATE:  2023    INTERPRETATION:  CLINICAL INFORMATION: Liver transplant evaluation.   Evaluate for metastatic disease.  COMPARISON: CT abdomen pelvis 2023  CONTRAST/COMPLICATIONS:  IV Contrast: NONE  Oral Contrast: NONE  Complications: None reported at time of study completion    PROCEDURE:  CT of the Chest was performed.  Sagittal and coronal reformats were performed.    FINDINGS: The quality of the images are degraded by streak artifacts.    LUNGS AND AIRWAYS: Patent central airways.  2 mm anterior right upper   lobe nodule (series 5 image 52). The lungs are otherwise clear.  PLEURA: No pleural effusion.  MEDIASTINUM AND TERESE: No lymphadenopathy.  VESSELS: Left anterior chest wall defibrillator with leads in the right   atrium, right ventricle, and left ventricle.  HEART: Heart size is normal. No pericardial effusion.  CHEST WALL AND LOWER NECK: Within normal limits.  VISUALIZED UPPER ABDOMEN: Moderate volume ascites. Cirrhotic liver.   Ill-defined hepatic lesions.  BONES: Degenerative changes.    IMPRESSION:  Nonspecific 2 mm right upper lobe nodule. Recommend chest CT in 3 months   to determine stability.    < from: NM SPECT/CT Bone, Single Area Single Day (23 @ 14:06) >  ACC: [id number] EXAM:  NM BONE SPECT CT SA SD   ORDERED BY: HI FENTON   ACC: [id number] EXAM:  NM BONE IMG WHOLE BODY   ORDERED BY: HI FENTON   PROCEDURE DATE:  2023    INTERPRETATION:  CLINICAL INFORMATION: Patient with cirrhosis, HCC,   portal hypertension. Staging scan pretransplant evaluation.  RADIOPHARMACEUTICAL: 19 mCi Tc-99m MDP, I.V.    TECHNIQUE: Anterior and posterior whole body images were obtained 2-3   hours following administration of radiopharmaceutical.  CHEST  SPECT/CT   was obtained immediately thereafter. The CT protocol was optimized for   attenuation correction and to provide anatomic detail for localization of   SPECT abnormalities. The CT protocol was not designed to produce and   cannot replace state-of-the-art diagnostic CT images with specific   imaging protocols for different body parts and indications.    COMPARISON: None  OTHER STUDIES USED FOR CORRELATION: None    FINDINGS:  Heterogeneous nonspecific uptake is noted along the thoracolumbar spine   likely degenerative in nature. Degenerative changes are noted in both   shoulders right more than left, right sternoclavicular joint, manubrium,   and both knees.    Distinct focus of uptake is noted along the left transverse process of T9   likely injury related.    Left-sided cardiac pacemaker. Cirrhosis. Abdominal ascites. Multiple   lesions are noted in both hepatic lobes.    Both kidneys are visualized.    IMPRESSION: No definite scintigraphic evidence of osseous metastasis.      < from: US Abdomen Doppler (23 @ 12:30) >  ACC: [id number] EXAM:  US DPLX ABDOMEN   ORDERED BY: AIMEE SKINNER   PROCEDURE DATE:  2023    INTERPRETATION:  CLINICAL INFORMATION: Cirrhosis.  COMPARISON: CT abdomen pelvis 2023.  TECHNIQUE: Duplex ultrasound of the abdomen was performed to evaluate the   hepatic vasculature utilizing color and spectral Doppler.    FINDINGS:    Cirrhosis. There is a 6.9 x 4.5 x 5.0 cm hypoechoic mass in the right   hepatic lobe. Additional 1.6 x 1.5 x 1.6 cm hypoechoic lesion in the   right hepatic lobe was visualized.    Moderate to severe volume ascites.    The main, right, and left portal veins are patent with normal direction   of flow. The visualized splenic vein is patent with normal direction of   flow. The hepatic artery is patentwith peak systolic velocity of 63   cm/s. The hepatic veins and IVC are patent with phasic waveforms.    IMPRESSION:  Cirrhosis. Multiple hypoechoic liver lesions which were noted on previous   CT.    Moderate to severe ascites.    Patent hepatic vasculature with normal direction of flow.    ECHO: Pending     1 or 2

## 2024-10-30 NOTE — H&P ADULT - HISTORY OF PRESENT ILLNESS
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 Reports that he started experiencing these symptoms starting yesterday and that he had difficulty walking and could not get out of bed. Patient denies any exacerbating factors, however reports marked improvement since he was admitted to the hospital. Patient also reports he has been making less urine, however, this is consistent with him drinking less water for the past 1-2 weeks (only about 16 oz daily). He states that he has been doing this intentionally because he noticed that increased water intake would cause him to have more ostomy output and it is expensive to frequently change the bag. Prior to this, patient does endorse that he did drink a lot of water because he was feeling very thirsty but forced himself to cut back due to the ostomy output. He also admits to self discontinuing home insulin and BG monitor because he has been eating a diet that has less sugar content and feels that this is enough to control his diabetes. Denies diarrhea, N/V, F/C, abdominal pain, increased ostomy output. States his symptoms that he presented with have resolved.    ED Course:  Vitals: 96/54, HR 69, RR 18, 99% RA, 97.5F  Labs: Hb 11.1, , Na 122, K 5.5, Cl 94, HCO3 14, Cr 6.47, BUN 78, , Lipase 179, vpH 7.19, vpCO2 36, vpO2 66.8, proBNP 364  EKG:  UA: (+) LE, moderate blood, WBC > 100, moderate bacteria, glucose 250  CT Head: negative  CT A/P: mild b/l ureteral thickening, may be chronic ureteritis, diffuse bladder wall thickening, may be related to chronic outlet obstruction/cystitis  Orders/Interventions: 2L NS, humulin 5u x1, meclizine 12.5mg x1, zofran 4mg x1   HPI:   64 M pmhx HTN, hypothyroidism, HLD, T2DM, CKD 3 (baseline Cr 2.3-2.5), colostomy, toxic megacolon, EtOH use disorder, cirrhosis (?), 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 Reports that he started experiencing these symptoms starting yesterday and that he had difficulty walking and could not get out of bed. Patient denies any exacerbating factors, however reports marked improvement since he was admitted to the hospital. Patient also reports he has been making less urine, however, this is consistent with him drinking less water for the past 1-2 weeks (only about 16 oz daily). He states that he has been doing this intentionally because he noticed that increased water intake would cause him to have more ostomy output and it is expensive to frequently change the bag. Prior to this, patient does endorse that he did drink a lot of water because he was feeling very thirsty but forced himself to cut back due to the ostomy output. He also admits to self discontinuing home insulin and BG monitor because he has been eating a diet that has less sugar content and feels that this is enough to control his diabetes. Denies diarrhea, N/V, F/C, abdominal pain, increased ostomy output. States his symptoms that he presented with have resolved.    ED Course:  Vitals: 96/54, HR 69, RR 18, 99% RA, 97.5F  Labs: Hb 11.1, , Na 122, K 5.5, Cl 94, HCO3 14, Cr 6.47, BUN 78, , Lipase 179, vpH 7.19, vpCO2 36, vpO2 66.8, proBNP 364  EKG:  UA: (+) LE, moderate blood, WBC > 100, moderate bacteria, glucose 250  CXR: negative for acute pathology  CT Head: negative  CT A/P: mild b/l ureteral thickening, may be chronic ureteritis, diffuse bladder wall thickening, may be related to chronic outlet obstruction/cystitis  Orders/Interventions: 2L NS, humulin 5u x1, meclizine 12.5mg x1, zofran 4mg x1

## 2024-10-30 NOTE — PHYSICAL THERAPY INITIAL EVALUATION ADULT - ADDITIONAL COMMENTS
Pt resides in hotel shelter for past year. Pt reports elevator access to room, uses rollator for outdoor mobility and SC for indoor. Pt reports independence w/ ambulation, ADLs, and has a friend bring him food occasionally.

## 2024-10-30 NOTE — PATIENT PROFILE ADULT - FALL HARM RISK - HARM RISK INTERVENTIONS

## 2024-10-30 NOTE — H&P ADULT - ASSESSMENT
Assessment    NEURO  #Metabolic Encephalopathy  Pt presenting with altered mental status, dizziness, weakness. Utox negative, CT head negative. Etiology likely secondary to metabolic acidosis 2/2 MEG vs UTI. UA positive for LE, bacteria, WBC, blood in urine. Mental status and presenting symptoms improving after fluids in ED. Currently AOx3.  - q6 FS  - f/u ABG  - dysphagia screen  - continue to monitor for acute changes in mental status    CARDIAC  #Hypovolemia  Pt hypovolemic on physical exam, given 2L NS in ED. Mental status and symptoms improved after fluids.  - continue with hydration as needed    PULMONARY  #CHANCE     RENAL  #Metabolic Acidosis  Etiology likely uremia 2/2 MEG. Pt with pH 7.19 on VBG.  - start bicarb drip  - repeat blood gas      #Hyperkalemia  K 5.5 on admission, likely iso MEG. EKG showing no hyperkalemic changes.   - f/u repeat BMP    #Non-aliguric MEG on Stage 3 CKD  Pt with CKD 3 (baseline Cr 2.3-2.5), currently with Cr 6.47. Etiology likely pre-renal ____?  - bladder scan  - urine studies  - f/u UA    #Hyponatremia  Presented with corrected Na 126.  - goal 136 by 10p 10/30  - f/u serum osms  - f/u urine studies    GI  #CHANCE    ENDOCRINE  #Type 2 Diabetes Mellitus  Patient with T2DM, on basal bolus insulin regimen 20/7 at home. + at time of admission.  - lantus 5 BID  - mISS  - carb consistent diet    HEMEONC  #Normocytic anemia  Per previous hospitalization, seems baseline around 9-10 per outpatient records, currently 11.  - no intervention at this time, continue to monitor    ID  #CHANCE    PROPHYLAXIS  Diet: carb consistent  Electrolytes: replete cautiously  DVT: heparin subq 5000  Code: full  Dispo: [facility]    NEURO  #Metabolic Encephalopathy  Pt presenting with altered mental status, dizziness, weakness. Utox negative, CT head negative. Etiology likely secondary to metabolic acidosis 2/2 MEG vs UTI. UA positive for LE, bacteria, WBC, blood in urine. Mental status and presenting symptoms improving after fluids in ED. Currently AOx3.  - q6 FS  - f/u ABG  - dysphagia screen  - continue to monitor for acute changes in mental status    CARDIAC  #Hypovolemia  Pt hypovolemic on physical exam, given 2L NS in ED. Mental status and symptoms improved after fluids.  - continue with hydration as needed    PULMONARY  #CHANCE     RENAL  #High Anion Gap Metabolic Acidosis  Etiology likely uremia 2/2 MEG. Pt with pH 7.19 on VBG. AG 14, improved to 12 with improvement in BUN.  - start bicarb drip  - repeat blood gas      #Hyperkalemia  K 5.5 on admission, likely iso MEG. EKG showing no hyperkalemic changes.   - f/u repeat BMP    #Non-aliguric MEG on Stage 3 CKD  Pt with CKD 3 (baseline Cr 2.3-2.5), currently with Cr 6.47. Etiology likely pre-renal in setting poor PO intake.  - bladder scan  - urine studies  - f/u UA    #Hyponatremia  Presented with corrected Na 126.  - goal 136 by 10p 10/30  - f/u serum osms  - f/u urine studies    GI  #CHANCE    ENDOCRINE  #Type 2 Diabetes Mellitus  Patient with T2DM, on basal bolus insulin regimen 20/7 at home. + at time of admission.  - lantus 5 BID  - mISS  - carb consistent diet    HEMEONC  #Normocytic anemia  Per previous hospitalization, seems baseline around 9-10 per outpatient records, currently 11.  - no intervention at this time, continue to monitor    ID  #CHANCE    PROPHYLAXIS  Diet: carb consistent  Electrolytes: replete cautiously  DVT: heparin subq 5000  Code: full  Dispo: [facility]  64 M pmhx HTN, hypothyroidism, HLD, T2DM, CKD 3 (baseline Cr 2.3-2.5), colostomy, toxic megacolon, EtOH use disorder, cirrhosis (?), presented for weakness and found to have HAGMA 2/2 MEG on CKD likely 2/2 poor PO intake, admitted to [facility] for bicarbonate drip, now improving s/p IV fluids.    NEURO  #Altered Mental Status, RESOLVED  Pt presented to ED with confusion. Utox negative, CT head negative. Patient markedly hypovolemic on physical exam in ED. Etiology likely 2/2 hypovolemia. Patient mental status resoved, now AOx3 s/p 2L NS.  - continue to monitor for acute changes in mental status    CARDIAC  #Hypovolemia  Pt hypovolemic on physical exam, given 2L NS in ED.  - continue with hydration as needed    PULMONARY  #CHANCE     RENAL  #High Anion Gap Metabolic Acidosis  Etiology likely uremia 2/2 MEG. Pt with pH 7.19 on VBG. AG 14, improved to 12 with improvement in BUN.  - start bicarb drip  - repeat blood gas      #Hyperkalemia  K 5.5 on admission, likely iso MEG. EKG showing no hyperkalemic changes.   - f/u repeat BMP    #Non-oliguric MEG on CKD  Pt with CKD 3 (baseline Cr 2.3-2.5), currently with Cr 6.47. Etiology likely pre-renal in setting poor PO intake.  - bladder scan  - urine studies  - f/u UA    #Hyponatremia  Presented with corrected Na 126.  - goal 136 by 10p 10/30  - f/u serum osms  - f/u urine studies    GI  #CHANCE    ENDOCRINE  #Type 2 Diabetes Mellitus  Patient with T2DM, on basal bolus insulin regimen 20/7 at home. + at time of admission.  - lantus 5 BID  - mISS  - carb consistent diet    HEMEONC  #Normocytic anemia  Per previous hospitalization, seems baseline around 9-10 per outpatient records, currently 11.  - no intervention at this time, continue to monitor    ID  #CHANCE    PROPHYLAXIS  Diet: carb consistent  Electrolytes: replete cautiously  DVT: heparin subq 5000  Code: full  Dispo: [facility]

## 2024-10-30 NOTE — PHYSICAL THERAPY INITIAL EVALUATION ADULT - SHORT TERM MEMORY, REHAB EVAL
Kya from Marion [phone].  
Per 8/18/20 telephone encounter patient would like to postpone having the monitor placed until she has additional symptoms. I will also call and follow up with her as well.  
Per spouse he is not aware of patient complaining of any other symptoms associated with heart.  
Please check diagnosis on this as well.  
This is fine.  You can use Osteoarthritis, generalized, Recent Fall    Also, she was supposed to have a holter monitor set up and I don't see that this was done?  Can you check on this please
intact

## 2024-10-30 NOTE — PROGRESS NOTE ADULT - SUBJECTIVE AND OBJECTIVE BOX
INTERVAL HPI/OVERNIGHT EVENTS:  Patient was seen and examined at bedside. As per nurse and patient, no o/n events, patient resting comfortably. No complaints at this time. Pt reports continuing to feel weak with some mild neck and shoulder pain. Denies fevers, chills, dizziness, HA, vision changes, CP, palpitations SOB, cough, N/V/D/C, dysuria, changes in bowel movements, LE edema. ROS otherwise negative.     VITAL SIGNS:  T(F): 98.9 (10-30-24 @ 13:29)  HR: 78 (10-30-24 @ 11:48)  BP: 164/75 (10-30-24 @ 11:48)  RR: 18 (10-30-24 @ 11:48)  SpO2: 100% (10-30-24 @ 11:48)        10-29-24 @ 07:01  -  10-30-24 @ 07:00  --------------------------------------------------------  IN: 0 mL / OUT: 300 mL / NET: -300 mL    10-30-24 @ 07:01  -  10-30-24 @ 14:06  --------------------------------------------------------  IN: 300 mL / OUT: 400 mL / NET: -100 mL        PHYSICAL EXAM:    Constitutional: resting comfortably in bed; NAD  HEENT: NC/AT, PERRL, EOMI, anicteric sclera, no nasal discharge; uvula midline, no oropharyngeal erythema or exudates; MMM  Neck: supple  Respiratory: CTA B/L; no W/R/R, no retractions  Cardiac: +S1/S2; RRR; no M/R/G; PMI non-displaced  Gastrointestinal: soft, NT/ND; no rebound or guarding; +BSx4, +brown loose stool in R side ostomy bag   Extremities: WWP, no clubbing or cyanosis; no peripheral edema, +venous stasis changes to legs b/l   Musculoskeletal: NROM x4; no joint swelling, tenderness or erythema  Vascular: 2+ radial, DP/PT pulses B/L  Neurologic: AAOx3; CNII-XII grossly intact; no focal deficits  Psychiatric: affect and characteristics of appearance, verbalizations, behaviors are appropriate    MEDICATIONS  (STANDING):  dextrose 5% 1000 milliLiter(s) (100 mL/Hr) IV Continuous <Continuous>  dextrose 5%. 1000 milliLiter(s) (50 mL/Hr) IV Continuous <Continuous>  dextrose 5%. 1000 milliLiter(s) (100 mL/Hr) IV Continuous <Continuous>  dextrose 50% Injectable 25 Gram(s) IV Push once  dextrose 50% Injectable 12.5 Gram(s) IV Push once  dextrose 50% Injectable 25 Gram(s) IV Push once  glucagon  Injectable 1 milliGRAM(s) IntraMuscular once  heparin   Injectable 5000 Unit(s) SubCutaneous every 8 hours  influenza   Vaccine 0.5 milliLiter(s) IntraMuscular once  insulin glargine Injectable (LANTUS) 10 Unit(s) SubCutaneous at bedtime  insulin lispro (ADMELOG) corrective regimen sliding scale   SubCutaneous three times a day before meals  insulin lispro Injectable (ADMELOG) 4 Unit(s) SubCutaneous three times a day before meals    MEDICATIONS  (PRN):  acetaminophen     Tablet .. 650 milliGRAM(s) Oral every 6 hours PRN Mild Pain (1 - 3)  dextrose Oral Gel 15 Gram(s) Oral once PRN Blood Glucose LESS THAN 70 milliGRAM(s)/deciliter      Allergies    No Known Allergies    Intolerances        LABS:                        9.3    6.76  )-----------( 124      ( 30 Oct 2024 05:24 )             26.3     10-30    125[L]  |  99  |  70[H]  ----------------------------<  270[H]  4.1   |  15[L]  |  5.62[H]    Ca    8.3[L]      30 Oct 2024 11:29  Phos  3.7     10-30  Mg     2.1     10-30    TPro  8.6[H]  /  Alb  3.5  /  TBili  0.5  /  DBili  x   /  AST  12[L]  /  ALT  11[L]  /  AlkPhos  162[H]  10-29    PT/INR - ( 29 Oct 2024 22:49 )   PT: 12.3 sec;   INR: 1.05          PTT - ( 29 Oct 2024 22:49 )  PTT:25.3 sec  Urinalysis Basic - ( 30 Oct 2024 11:29 )    Color: x / Appearance: x / SG: x / pH: x  Gluc: 270 mg/dL / Ketone: x  / Bili: x / Urobili: x   Blood: x / Protein: x / Nitrite: x   Leuk Esterase: x / RBC: x / WBC x   Sq Epi: x / Non Sq Epi: x / Bacteria: x        RADIOLOGY & ADDITIONAL TESTS:  Reviewed

## 2024-10-30 NOTE — PROGRESS NOTE ADULT - ASSESSMENT
Assessment  64 M pmhx HTN, hypothyroidism, HLD, T2DM, CKD 3 (baseline Cr 2.3-2.5), colostomy, toxic megacolon, EtOH use disorder, cirrhosis (?), presented for weakness and found to have HAGMA 2/2 MEG on CKD likely 2/2 poor PO intake, admitted to [facility] for bicarbonate drip, now improving s/p IV fluids.    NEURO  #Altered Mental Status, RESOLVED  Pt presented to ED with confusion. Utox negative, CT head negative. Patient markedly hypovolemic on physical exam in ED. Etiology likely 2/2 hypovolemia. Patient mental status resolved, now AOx3 s/p 2L NS.  - continue to monitor for acute changes in mental status    CARDIAC  #Hypovolemia  Pt hypovolemic on physical exam, given 2L NS in ED.  - continue with hydration as needed    PULMONARY  #CHANCE     RENAL  #Non-Anion Gap Metabolic Acidosis  Etiology likely uremia 2/2 MEG. Pt with pH 7.19 on VBG. AG 14, improved to 12 with improvement in BUN.  - c/w bicarb drip  - repeat VBG and BMP       #Hyperkalemia, RESOLVED  K 5.5 on admission, likely iso MEG. EKG showing no hyperkalemic changes.   - repeat BMP showed K+ 4.1, normalized     #Non-oliguric MEG on CKD  Pt with CKD 3 (baseline Cr 2.3-2.5), currently with Cr 6.47. Etiology likely pre-renal in setting poor PO intake.  - bladder scan  - urine studies  - UA shows bacteria and LE; however pt remains asymptomatic. no indication for treatment. ctm      #Hyponatremia  Presented with corrected Na 126. Pt maintaining Na+ 125 this AM   -f/u repeat BMP after sodium bicarb ggt   - goal 136 by 10p 10/30  - pt also had elevated protein gap >4 , f/u SPEP   - urine studies FENa 1.4% FEUrea 41.4% which suggest instrinsic renal disease leading to hyponatremia  -hyponatremia also may be from elevated glucose on admission   -correct glucose with insulin, c/w sodium bicarb   -f/u nephro recs     GI  #?Liver Cirrhosis   -Pt has reported questionable hx of liver cirrhosis. AST/ALT low which may represent normal liver or advanced liver cirrhosis.   -f/u SPEP iso elevated protein gap  -f/u US abdomen  -consider HRS if pt has liver cirrhosis as pt has worsening CKD.     ENDOCRINE  #Type 2 Diabetes Mellitus  Patient with T2DM, on basal bolus insulin regimen 20/7 at home. + at time of admission.  - lantus 5 BID  - mISS  - carb consistent diet  -pt last picked up insulin from pharmacy in jan 2024. At that time, pt dosing was 24 units lantus in the morning.     HEMEONC  #Normocytic anemia  Per previous hospitalization, seems baseline around 9-10 per outpatient records, currently 11.  - no intervention at this time, continue to monitor    ID  #CHANCE    PROPHYLAXIS  Diet: carb consistent  Electrolytes: replete cautiously  DVT: heparin subq 5000  Code: full  Dispo: 7 [facility]

## 2024-10-30 NOTE — H&P ADULT - CONVERSATION DETAILS
Patient inquired about his desire to be placed on mechanical ventilation if unable to breathe by himself or to receive compressions/medications/shocks in case of cardiac arrest. Patient reported being interested on both interventions and such will remain full code.

## 2024-10-30 NOTE — ED PROVIDER NOTE - NSICDXPASTSURGICALHX_GEN_ALL_CORE_FT
PAST SURGICAL HISTORY:  History of colectomy     History of ileostomy     No significant past surgical history

## 2024-10-30 NOTE — ED ADULT NURSE REASSESSMENT NOTE - NS ED NURSE REASSESS COMMENT FT1
Assume care of patient, awake and comfortable at this time, still has neck, back and right hip pain. Stated that his legs were shaky and weak lately. Not in any distress at this time. Patient went to CT scan.

## 2024-10-30 NOTE — CONSULT NOTE ADULT - SUBJECTIVE AND OBJECTIVE BOX
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 Reports that he started experiencing these symptoms starting yesterday and that he had difficulty walking and could not get out of bed. Patient denies any exacerbating factors, however reports marked improvement since he was admitted to the hospital. Patient also reports he has been making less urine, however, this is consistent with him drinking less water for the past 1-2 weeks (only about 16 oz daily). He states that he has been doing this intentionally because he noticed that increased water intake would cause him to have more ostomy output and it is expensive to frequently change the bag. Prior to this, patient does endorse that he did drink a lot of water because he was feeling very thirsty but forced himself to cut back due to the ostomy output. He also admits to self discontinuing home insulin and BG monitor because he has been eating a diet that has less sugar content and feels that this is enough to control his diabetes. Denies diarrhea, N/V, F/C, abdominal pain, increased ostomy output. States his symptoms that he presented with have resolved.    ED Course:  Vitals: 96/54, HR 69, RR 18, 99% RA, 97.5F  Labs: Hb 11.1, , Na 122, K 5.5, Cl 94, HCO3 14, Cr 6.47, BUN 78, , Lipase 179, vpH 7.19, vpCO2 36, vpO2 66.8, proBNP 364  EKG:  UA: (+) LE, moderate blood, WBC > 100, moderate bacteria, glucose 250  CXR: negative for acute pathology  CT Head: negative  CT A/P: mild b/l ureteral thickening, may be chronic ureteritis, diffuse bladder wall thickening, may be related to chronic outlet obstruction/cystitis    Subjective: Patient examined at bedside. Reports he felt weak for the past two day as well as a feeling of "legs shaking" when walking  causing him to fall 3 times. He does not have a nephrologist at this time and has not had one for the past few years. A few years ago he had a nephrologist he would see at [place] however after [employer] set him up with a new nephrologist close to [facility] [place] he stopped going because it was "too far away", with a commute time of roughly 3 hours. Over the past few days he stopped taking his insulin as well as eating and drinking less because it caused him to have to change out is colostomy bag and that it was getting cumbersome and expensive to change out. Also caused him problems when sleeping due to positioning.    Patient denying chest pain, SOB, palpitations, cough. Patient denies fever, chills, HA, Dizziness, N/V, abdominal pain, diarrhea, constipation, hematochezia/melena, dysuria, hematuria, new onset weakness/numbness, LE pain and/or swelling.    Remaining ROS negative       PHYSICAL EXAM:    General:NAD.   HEENT: NC/AT; PERRL, anicteric sclera; MMM  Neck: supple  Cardiovascular: +S1/S2, RRR  Respiratory: CTA B/L; no W/R/R  Gastrointestinal: soft, NT/ND; +BSx4, +ileostomy bag in place draining brown stool, no signs of pus, purulent drainage, or blood in bag   Extremities: WWP; no edema, clubbing or cyanosis  Vascular: 2+ radial, DP/PT pulses B/L  Neurological: AAOx3; no focal deficits  Psychiatric: pleasant mood and affect  Dermatologic: no appreciable wounds or damage to the skin    VITAL SIGNS:  Vital Signs Last 24 Hrs  T(C): 37.2 (30 Oct 2024 13:29), Max: 37.2 (30 Oct 2024 13:29)  T(F): 98.9 (30 Oct 2024 13:29), Max: 98.9 (30 Oct 2024 13:29)  HR: 78 (30 Oct 2024 11:48) (61 - 78)  BP: 164/75 (30 Oct 2024 11:48) (96/54 - 164/75)  BP(mean): 108 (30 Oct 2024 11:48) (90 - 108)  RR: 18 (30 Oct 2024 11:48) (16 - 18)  SpO2: 100% (30 Oct 2024 11:48) (98% - 100%)    Parameters below as of 30 Oct 2024 11:48  Patient On (Oxygen Delivery Method): room air        MEDICATIONS:  MEDICATIONS  (STANDING):  dextrose 5% 1000 milliLiter(s) (100 mL/Hr) IV Continuous <Continuous>  dextrose 5%. 1000 milliLiter(s) (100 mL/Hr) IV Continuous <Continuous>  dextrose 5%. 1000 milliLiter(s) (50 mL/Hr) IV Continuous <Continuous>  dextrose 50% Injectable 25 Gram(s) IV Push once  dextrose 50% Injectable 12.5 Gram(s) IV Push once  dextrose 50% Injectable 25 Gram(s) IV Push once  glucagon  Injectable 1 milliGRAM(s) IntraMuscular once  heparin   Injectable 5000 Unit(s) SubCutaneous every 8 hours  influenza   Vaccine 0.5 milliLiter(s) IntraMuscular once  insulin glargine Injectable (LANTUS) 10 Unit(s) SubCutaneous at bedtime  insulin lispro (ADMELOG) corrective regimen sliding scale   SubCutaneous three times a day before meals  insulin lispro Injectable (ADMELOG) 4 Unit(s) SubCutaneous three times a day before meals    MEDICATIONS  (PRN):  acetaminophen     Tablet .. 650 milliGRAM(s) Oral every 6 hours PRN Mild Pain (1 - 3)  dextrose Oral Gel 15 Gram(s) Oral once PRN Blood Glucose LESS THAN 70 milliGRAM(s)/deciliter      ALLERGIES:  Allergies    No Known Allergies    Intolerances        LABS:                        9.3    6.76  )-----------( 124      ( 30 Oct 2024 05:24 )             26.3     10-30    125[L]  |  99  |  70[H]  ----------------------------<  270[H]  4.1   |  15[L]  |  5.62[H]    Ca    8.3[L]      30 Oct 2024 11:29  Phos  3.7     10-30  Mg     2.1     10-30    TPro  8.6[H]  /  Alb  3.5  /  TBili  0.5  /  DBili  x   /  AST  12[L]  /  ALT  11[L]  /  AlkPhos  162[H]  10-29    PT/INR - ( 29 Oct 2024 22:49 )   PT: 12.3 sec;   INR: 1.05          PTT - ( 29 Oct 2024 22:49 )  PTT:25.3 sec  Urinalysis Basic - ( 30 Oct 2024 11:29 )    Color: x / Appearance: x / SG: x / pH: x  Gluc: 270 mg/dL / Ketone: x  / Bili: x / Urobili: x   Blood: x / Protein: x / Nitrite: x   Leuk Esterase: x / RBC: x / WBC x   Sq Epi: x / Non Sq Epi: x / Bacteria: x      CAPILLARY BLOOD GLUCOSE      POCT Blood Glucose.: 306 mg/dL (30 Oct 2024 12:11)      RADIOLOGY & ADDITIONAL TESTS: Reviewed.

## 2024-10-30 NOTE — CONSULT NOTE ADULT - ASSESSMENT
62 YOM PMHx IDDMII, hypothyroidism, hypertension, s/p colostomy, previous issues with electrolyte disturbances inc hyponatremia, CKD III baseline sCr 2.1-2.5, [facility] ED [facility] withconfusion, ostomy bag break, unable to provide history.  Found to have UTI, hyperkalemia.  Baseline renal function seems to be around sCr of 2.1-2.5.  Nephrology consulted for MEG on CKD.           #Non-Anion Gap Metabolic Acidosis with inadequate respiratory compensation   - Morning labs show CO2: 15, low compared to normal value of 24, Anion Gap of 12 however corrected for hyperglycemia as seen on serum glucose, gap is closer to 14 however this is still within normal ranges. Points towards non-anion gap metabolic acidosis.   - ABG: pH:7.21, pointing towards metabolic acidosis however PCO2: 31, low compared to serum pH, would expect it to be > 40 in pure respiratory acidosis. Likely non-anion gap metabolic acidosis with inadequate respiratory compensation. Concern he may tire out.   - Losses of bicarb include losses through  stool in ileostomy bag. Less likely etiology include vomiting, normal chloride and denied symptoms.   Plan   - recommend to continue D5w drip with 3ams of NaHCO3  - F/U repeat BMP       #MEG on CKD   - Presenting with sodium of 125. Likely secondary to   Patient noted to have MEG with Cr 5.6 over baseline 2.5 Likely pre-renal 2/2 hypovolemia given Deloris<20, recent decreased PO intake as well as improvement s/p 2L of fluid.   - Initial concern for hepatorenal syndrome as patient was noted to have possible evidence of cirrhosis on CT abdomen pelvis from 2020 however repeat CT on this admission and in 2022 with not signs of cirrhosis or hepatic injury. Cr also improved s/p 2L NS, which would point against hepatorenal Syndrome which would not improve with fluids and require albumin.     Recommendations   - BMP  - trend Cr  - avoid nephrotoxic drugs, renally dose meds  - can repeat urine lytes

## 2024-10-30 NOTE — ED PROVIDER NOTE - CRITICAL CARE ATTENDING CONTRIBUTION TO CARE
64-year-old male complaining of dizziness, lightheadedness, weakness, fatigue.  Patient with history of diabetes, type 2 diabetes with ketoacidosis in 2020, stage III chronic kidney disease, colostomy, history of toxic megacolon, anemia, EtOH use disorder, hyperlipidemia, cirrhosis.  Patient's last ICU admission was in 2020.  Patient with significantly elevated creatinine from prior value, patient has not made urine since yesterday.  Patient with hyperglycemia today and serum pH of 7.19.  Patient able to tolerate p.o. and speaking complete sentences.  Case discussed with Dr. Silverman, ICU attending.  Will admit to stepdown for further management.

## 2024-10-30 NOTE — PHYSICAL THERAPY INITIAL EVALUATION ADULT - GENERAL OBSERVATIONS, REHAB EVAL
PT IE Completed. Pt received semi-supine in bed, NAD, +PIV, +tele, +ostomy bag, +RA, +CB, +alarm. Cleared for PT by ARIES Novoa. Pt requires 1 person stand by assist for OOB mobility.

## 2024-10-30 NOTE — H&P ADULT - ATTENDING COMMENTS
Suresh Wilkerson   [id number]  As above, this is a 63 y/o male with toxic megacolon and with a colostomy, DM, HTN,  liver cirrhosis, CKA who presented with generalized weakness, unable to walk.  -Generalized weakness  -severe dehydration  -acute on chronic renal failure  -hyponatremia  -Liver cirrhosis  -DM II with hyperglycemia  >Thiamine IV  >check VBG  >3 amps bicarb in sterile water, IV  >Renal US, nephrology consultation, avoid nephrotoxic drugs  Please see above for the rest of the details.  Time 55 min.

## 2024-10-30 NOTE — ED PROVIDER NOTE - SECONDARY DIAGNOSIS.
Lightheadedness Acidemia Acute weakness Stage 3 chronic kidney disease Acute renal failure (ARF) Anuria Dehydration with hyponatremia Hyperkalemia

## 2024-10-30 NOTE — PHYSICAL THERAPY INITIAL EVALUATION ADULT - GAIT DEVIATIONS NOTED, PT EVAL
Pt demos fairly steady gait, reports feeling dizzy and blurred vision post toileting threfore returned to supine in bed and deferred further amb (MD aware)

## 2024-10-30 NOTE — H&P ADULT - NSHPPHYSICALEXAM_GEN_ALL_CORE
.  VITAL SIGNS:  T(F): 97.2 (10-30-24 @ 04:40), Max: 97.5 (10-29-24 @ 22:39)  HR: 78 (10-30-24 @ 04:40) (61 - 78)  BP: 138/63 (10-30-24 @ 04:40) (96/54 - 138/63)  BP(mean): 90 (10-30-24 @ 04:40) (90 - 90)  RR: 18 (10-30-24 @ 04:40) (16 - 18)  SpO2: 99% (10-30-24 @ 04:40) (98% - 99%)    PHYSICAL EXAM:  Constitutional: resting comfortably in bed; NAD  HEENT: NC/AT, PERRL, EOMI, anicteric sclera, MMM  Neck: supple; no JVD or thyromegaly  Respiratory: unlabored breathing, CTA B/L; no Rhonchi/Crackles, no retractions or use of accessory muscles   Cardiac: +S1/S2; RRR; no M/R/G  Gastrointestinal: soft, NT/ND; no rebound or guarding; +BSx4  Extremities: WWP, no clubbing or cyanosis; no peripheral edema  Musculoskeletal: NROM x4; no joint swelling, tenderness or erythema  Vascular: 2+ radial, DP/PT pulses B/L  Dermatologic: skin warm, dry and intact; no rashes, wounds, or scars  Neurologic: AAOx3; CNII-XII grossly intact; no focal deficits  Psychiatric: affect and characteristics of appearance, verbalizations, behaviors are appropriate, denies SI/HI/AH/VH .  VITAL SIGNS:  T(F): 97.2 (10-30-24 @ 04:40), Max: 97.5 (10-29-24 @ 22:39)  HR: 78 (10-30-24 @ 04:40) (61 - 78)  BP: 138/63 (10-30-24 @ 04:40) (96/54 - 138/63)  BP(mean): 90 (10-30-24 @ 04:40) (90 - 90)  RR: 18 (10-30-24 @ 04:40) (16 - 18)  SpO2: 99% (10-30-24 @ 04:40) (98% - 99%)    PHYSICAL EXAM:  Constitutional: resting comfortably in bed; NAD  HEENT: mildly dry mucous membranes, NC/AT, PERRL, EOMI, anicteric sclera  Neck: supple; no JVD  Respiratory: unlabored breathing, CTA B/L  Cardiac: +S1/S2; RRR; no M/R/G  Gastrointestinal: soft, NT/ND; no rebound or guarding; +BSx4  Extremities: R foot transmetatarsal amputation, otherwise WWP, no clubbing or cyanosis; no peripheral edema  Musculoskeletal: no joint swelling, tenderness or erythema  Vascular: 2+ radial, DP/PT pulses B/L  Dermatologic: skin warm, dry and intact  Neurologic: AAOx3; CNII-XII grossly intact; no focal deficits  Psychiatric: affect and characteristics of appearance, verbalizations, behaviors are appropriate, denies SI/HI/AH/VH

## 2024-10-30 NOTE — CONSULT NOTE ADULT - ATTENDING COMMENTS
I:  Cr 6. SNa 125 (corrected for hyperglycemia).   A: MEG with low urine sodium probably hypovolemia. No clear evidence of cirrhosis on imaging studies so HRS unlikely.   P: Follow SCr. Continue IVF. Follow bicarb.

## 2024-10-31 DIAGNOSIS — R00.1 BRADYCARDIA, UNSPECIFIED: ICD-10-CM

## 2024-10-31 DIAGNOSIS — E03.9 HYPOTHYROIDISM, UNSPECIFIED: ICD-10-CM

## 2024-10-31 DIAGNOSIS — D64.9 ANEMIA, UNSPECIFIED: ICD-10-CM

## 2024-10-31 DIAGNOSIS — E11.42 TYPE 2 DIABETES MELLITUS WITH DIABETIC POLYNEUROPATHY: ICD-10-CM

## 2024-10-31 DIAGNOSIS — I10 ESSENTIAL (PRIMARY) HYPERTENSION: ICD-10-CM

## 2024-10-31 DIAGNOSIS — G93.41 METABOLIC ENCEPHALOPATHY: ICD-10-CM

## 2024-10-31 DIAGNOSIS — E78.5 HYPERLIPIDEMIA, UNSPECIFIED: ICD-10-CM

## 2024-10-31 DIAGNOSIS — Z29.9 ENCOUNTER FOR PROPHYLACTIC MEASURES, UNSPECIFIED: ICD-10-CM

## 2024-10-31 DIAGNOSIS — N17.9 ACUTE KIDNEY FAILURE, UNSPECIFIED: ICD-10-CM

## 2024-10-31 DIAGNOSIS — E87.20 ACIDOSIS, UNSPECIFIED: ICD-10-CM

## 2024-10-31 LAB
A1C WITH ESTIMATED AVERAGE GLUCOSE RESULT: 9.2 % — HIGH (ref 4–5.6)
ALBUMIN SERPL ELPH-MCNC: 3.5 G/DL — SIGNIFICANT CHANGE UP (ref 3.3–5)
ALP SERPL-CCNC: 126 U/L — HIGH (ref 40–120)
ALT FLD-CCNC: 7 U/L — LOW (ref 10–45)
ANION GAP SERPL CALC-SCNC: 11 MMOL/L — SIGNIFICANT CHANGE UP (ref 5–17)
ANION GAP SERPL CALC-SCNC: 7 MMOL/L — SIGNIFICANT CHANGE UP (ref 5–17)
APTT BLD: 26.4 SEC — SIGNIFICANT CHANGE UP (ref 24.5–35.6)
AST SERPL-CCNC: 9 U/L — LOW (ref 10–40)
BASE EXCESS BLDV CALC-SCNC: -5.8 MMOL/L — LOW (ref -2–3)
BASOPHILS # BLD AUTO: 0.01 K/UL — SIGNIFICANT CHANGE UP (ref 0–0.2)
BASOPHILS NFR BLD AUTO: 0.2 % — SIGNIFICANT CHANGE UP (ref 0–2)
BILIRUB SERPL-MCNC: 0.2 MG/DL — SIGNIFICANT CHANGE UP (ref 0.2–1.2)
BUN SERPL-MCNC: 66 MG/DL — HIGH (ref 7–23)
BUN SERPL-MCNC: 67 MG/DL — HIGH (ref 7–23)
CALCIUM SERPL-MCNC: 8.3 MG/DL — LOW (ref 8.4–10.5)
CALCIUM SERPL-MCNC: 8.5 MG/DL — SIGNIFICANT CHANGE UP (ref 8.4–10.5)
CHLORIDE SERPL-SCNC: 100 MMOL/L — SIGNIFICANT CHANGE UP (ref 96–108)
CHLORIDE SERPL-SCNC: 105 MMOL/L — SIGNIFICANT CHANGE UP (ref 96–108)
CO2 BLDV-SCNC: 21 MMOL/L — LOW (ref 22–26)
CO2 SERPL-SCNC: 20 MMOL/L — LOW (ref 22–31)
CO2 SERPL-SCNC: 20 MMOL/L — LOW (ref 22–31)
CREAT SERPL-MCNC: 5.67 MG/DL — HIGH (ref 0.5–1.3)
CREAT SERPL-MCNC: 5.75 MG/DL — HIGH (ref 0.5–1.3)
EGFR: 10 ML/MIN/1.73M2 — LOW
EGFR: 10 ML/MIN/1.73M2 — LOW
EOSINOPHIL # BLD AUTO: 0.13 K/UL — SIGNIFICANT CHANGE UP (ref 0–0.5)
EOSINOPHIL NFR BLD AUTO: 2.6 % — SIGNIFICANT CHANGE UP (ref 0–6)
ESTIMATED AVERAGE GLUCOSE: 217 MG/DL — HIGH (ref 68–114)
GLUCOSE BLDC GLUCOMTR-MCNC: 126 MG/DL — HIGH (ref 70–99)
GLUCOSE BLDC GLUCOMTR-MCNC: 203 MG/DL — HIGH (ref 70–99)
GLUCOSE BLDC GLUCOMTR-MCNC: 359 MG/DL — HIGH (ref 70–99)
GLUCOSE BLDC GLUCOMTR-MCNC: 434 MG/DL — HIGH (ref 70–99)
GLUCOSE SERPL-MCNC: 133 MG/DL — HIGH (ref 70–99)
GLUCOSE SERPL-MCNC: 200 MG/DL — HIGH (ref 70–99)
HCO3 BLDV-SCNC: 20 MMOL/L — LOW (ref 22–29)
HCT VFR BLD CALC: 24.4 % — LOW (ref 39–50)
HGB BLD-MCNC: 8.7 G/DL — LOW (ref 13–17)
IMM GRANULOCYTES NFR BLD AUTO: 0.6 % — SIGNIFICANT CHANGE UP (ref 0–0.9)
INR BLD: 0.98 — SIGNIFICANT CHANGE UP (ref 0.85–1.16)
LYMPHOCYTES # BLD AUTO: 1.18 K/UL — SIGNIFICANT CHANGE UP (ref 1–3.3)
LYMPHOCYTES # BLD AUTO: 23.4 % — SIGNIFICANT CHANGE UP (ref 13–44)
MAGNESIUM SERPL-MCNC: 1.8 MG/DL — SIGNIFICANT CHANGE UP (ref 1.6–2.6)
MCHC RBC-ENTMCNC: 31.1 PG — SIGNIFICANT CHANGE UP (ref 27–34)
MCHC RBC-ENTMCNC: 35.7 G/DL — SIGNIFICANT CHANGE UP (ref 32–36)
MCV RBC AUTO: 87.1 FL — SIGNIFICANT CHANGE UP (ref 80–100)
MONOCYTES # BLD AUTO: 0.61 K/UL — SIGNIFICANT CHANGE UP (ref 0–0.9)
MONOCYTES NFR BLD AUTO: 12.1 % — SIGNIFICANT CHANGE UP (ref 2–14)
NEUTROPHILS # BLD AUTO: 3.09 K/UL — SIGNIFICANT CHANGE UP (ref 1.8–7.4)
NEUTROPHILS NFR BLD AUTO: 61.1 % — SIGNIFICANT CHANGE UP (ref 43–77)
NRBC # BLD: 0 /100 WBCS — SIGNIFICANT CHANGE UP (ref 0–0)
PCO2 BLDV: 37 MMHG — LOW (ref 42–55)
PH BLDV: 7.33 — SIGNIFICANT CHANGE UP (ref 7.32–7.43)
PHOSPHATE SERPL-MCNC: 3.8 MG/DL — SIGNIFICANT CHANGE UP (ref 2.5–4.5)
PLATELET # BLD AUTO: 139 K/UL — LOW (ref 150–400)
PO2 BLDV: 48 MMHG — HIGH (ref 25–45)
POTASSIUM SERPL-MCNC: 4 MMOL/L — SIGNIFICANT CHANGE UP (ref 3.5–5.3)
POTASSIUM SERPL-MCNC: 4.1 MMOL/L — SIGNIFICANT CHANGE UP (ref 3.5–5.3)
POTASSIUM SERPL-SCNC: 4 MMOL/L — SIGNIFICANT CHANGE UP (ref 3.5–5.3)
POTASSIUM SERPL-SCNC: 4.1 MMOL/L — SIGNIFICANT CHANGE UP (ref 3.5–5.3)
PROT SERPL-MCNC: 6.2 G/DL — SIGNIFICANT CHANGE UP (ref 6–8.3)
PROT SERPL-MCNC: 7.1 G/DL — SIGNIFICANT CHANGE UP (ref 6–8.3)
PROTHROM AB SERPL-ACNC: 11.3 SEC — SIGNIFICANT CHANGE UP (ref 9.9–13.4)
RBC # BLD: 2.8 M/UL — LOW (ref 4.2–5.8)
RBC # FLD: 12.8 % — SIGNIFICANT CHANGE UP (ref 10.3–14.5)
SAO2 % BLDV: 83.5 % — SIGNIFICANT CHANGE UP (ref 67–88)
SODIUM SERPL-SCNC: 131 MMOL/L — LOW (ref 135–145)
SODIUM SERPL-SCNC: 132 MMOL/L — LOW (ref 135–145)
TSH SERPL-MCNC: 2.59 UIU/ML — SIGNIFICANT CHANGE UP (ref 0.27–4.2)
WBC # BLD: 5.05 K/UL — SIGNIFICANT CHANGE UP (ref 3.8–10.5)
WBC # FLD AUTO: 5.05 K/UL — SIGNIFICANT CHANGE UP (ref 3.8–10.5)

## 2024-10-31 PROCEDURE — 76700 US EXAM ABDOM COMPLETE: CPT | Mod: 26

## 2024-10-31 PROCEDURE — 99233 SBSQ HOSP IP/OBS HIGH 50: CPT | Mod: GC

## 2024-10-31 PROCEDURE — 99223 1ST HOSP IP/OBS HIGH 75: CPT

## 2024-10-31 RX ORDER — INSULIN LISPRO 100/ML
5 VIAL (ML) SUBCUTANEOUS
Refills: 0 | Status: DISCONTINUED | OUTPATIENT
Start: 2024-10-31 | End: 2024-11-02

## 2024-10-31 RX ORDER — NIFEDIPINE 90 MG
60 TABLET, EXTENDED RELEASE 24 HR ORAL EVERY 24 HOURS
Refills: 0 | Status: DISCONTINUED | OUTPATIENT
Start: 2024-10-31 | End: 2024-10-31

## 2024-10-31 RX ORDER — ERYTHROPOIETIN 10000 [IU]/ML
7000 INJECTION, SOLUTION INTRAVENOUS; SUBCUTANEOUS ONCE
Refills: 0 | Status: COMPLETED | OUTPATIENT
Start: 2024-10-31 | End: 2024-11-01

## 2024-10-31 RX ORDER — DULOXETINE HYDROCHLORIDE 30 MG/1
30 CAPSULE, DELAYED RELEASE ORAL AT BEDTIME
Refills: 0 | Status: DISCONTINUED | OUTPATIENT
Start: 2024-10-31 | End: 2024-10-31

## 2024-10-31 RX ORDER — NIFEDIPINE 90 MG
60 TABLET, EXTENDED RELEASE 24 HR ORAL EVERY 24 HOURS
Refills: 0 | Status: DISCONTINUED | OUTPATIENT
Start: 2024-11-01 | End: 2024-11-05

## 2024-10-31 RX ORDER — GABAPENTIN 300 MG/1
100 CAPSULE ORAL DAILY
Refills: 0 | Status: DISCONTINUED | OUTPATIENT
Start: 2024-10-31 | End: 2024-11-01

## 2024-10-31 RX ORDER — INSULIN GLARGINE,HUM.REC.ANLOG 100/ML
12 VIAL (ML) SUBCUTANEOUS AT BEDTIME
Refills: 0 | Status: DISCONTINUED | OUTPATIENT
Start: 2024-10-31 | End: 2024-11-01

## 2024-10-31 RX ORDER — SODIUM BICARBONATE 1 MEQ/ML
650 VIAL (ML) INTRAVENOUS EVERY 8 HOURS
Refills: 0 | Status: DISCONTINUED | OUTPATIENT
Start: 2024-10-31 | End: 2024-11-03

## 2024-10-31 RX ORDER — HEPARIN SODIUM 10000 [USP'U]/ML
5000 INJECTION INTRAVENOUS; SUBCUTANEOUS EVERY 8 HOURS
Refills: 0 | Status: COMPLETED | OUTPATIENT
Start: 2024-10-31 | End: 2024-10-31

## 2024-10-31 RX ADMIN — TRAZODONE HYDROCHLORIDE 100 MILLIGRAM(S): 100 TABLET ORAL at 00:00

## 2024-10-31 RX ADMIN — Medication 10 MILLIGRAM(S): at 22:43

## 2024-10-31 RX ADMIN — Medication 650 MILLIGRAM(S): at 00:01

## 2024-10-31 RX ADMIN — Medication 10: at 16:40

## 2024-10-31 RX ADMIN — Medication 12: at 22:43

## 2024-10-31 RX ADMIN — Medication 60 MILLIGRAM(S): at 16:28

## 2024-10-31 RX ADMIN — Medication 4 UNIT(S): at 16:40

## 2024-10-31 RX ADMIN — Medication 50 MICROGRAM(S): at 06:00

## 2024-10-31 RX ADMIN — Medication 650 MILLIGRAM(S): at 22:43

## 2024-10-31 RX ADMIN — Medication 100 MILLILITER(S): at 02:03

## 2024-10-31 RX ADMIN — GABAPENTIN 100 MILLIGRAM(S): 300 CAPSULE ORAL at 14:44

## 2024-10-31 RX ADMIN — Medication 4: at 06:50

## 2024-10-31 RX ADMIN — Medication 650 MILLIGRAM(S): at 13:25

## 2024-10-31 RX ADMIN — HEPARIN SODIUM 5000 UNIT(S): 10000 INJECTION INTRAVENOUS; SUBCUTANEOUS at 22:43

## 2024-10-31 RX ADMIN — Medication 650 MILLIGRAM(S): at 01:14

## 2024-10-31 RX ADMIN — Medication 12 UNIT(S): at 22:42

## 2024-10-31 RX ADMIN — HEPARIN SODIUM 5000 UNIT(S): 10000 INJECTION INTRAVENOUS; SUBCUTANEOUS at 06:51

## 2024-10-31 RX ADMIN — HEPARIN SODIUM 5000 UNIT(S): 10000 INJECTION INTRAVENOUS; SUBCUTANEOUS at 14:44

## 2024-10-31 RX ADMIN — Medication 0.4 MILLIGRAM(S): at 22:43

## 2024-10-31 NOTE — PROGRESS NOTE ADULT - PROBLEM SELECTOR PLAN 3
IMPROVED. Etiology likely uremia 2/2 MEG. Pt with pH 7.19 on VBG. AG 14, improved to 12 with improvement in BUN.  - repeat VBG and BMP wnl (VBG 10/30 7.33, Bicarb 20, BMP Na 131 this AM) #Altered Mental Status, RESOLVED  Pt presented to ED with confusion. Utox negative, CT head negative. Patient markedly hypovolemic on physical exam in ED. Etiology likely 2/2 hypovolemia. Patient mental status resolved, now AOx3 s/p 2L NS.    - Continue to monitor for acute changes in mental status RESOLVED. Pt presented to ED with confusion. Utox negative, CT head negative. Patient markedly hypovolemic on physical exam in ED. Etiology likely 2/2 hypovolemia. Patient mental status resolved, now AOx3 s/p 2L NS.    - Continue to monitor for acute changes in mental status

## 2024-10-31 NOTE — PROGRESS NOTE ADULT - PROBLEM SELECTOR PLAN 9
On home Atorvastatin 10mg QHS    - Continue home meds On home Atorvastatin 10mg QHS and reports good adherence.    - Continue home meds

## 2024-10-31 NOTE — PROGRESS NOTE ADULT - PROBLEM SELECTOR PLAN 6
-Pt HR in 40s o/n after trazodone given for sleep. Trazodone has cholinergic effects that can lead to bradycardia.  Pt HR improved to 60s during the day.   -HOLD trazodone, consider melatonin for sleep instead or lower dose of trazodone (25mg or 50mg) Pt reports Trazodone 100mg QHS as home med. On 10/31, Pt HR in 40s o/n after trazodone given for sleep. Trazodone has cholinergic effects that can lead to bradycardia.  Pt HR improved to 60s during the day.     - HOLD trazodone, consider melatonin for sleep instead or lower dose of trazodone (25mg or 50mg) Pt reports Trazodone 100mg QHS as home med. On 10/31, Pt HR in 40s o/n after trazodone given for sleep. Trazodone has cholinergic effects that can lead to bradycardia.  Pt HR improved to 60s during the day.     - HOLD trazodone, can consider melatonin for sleep instead or lower dose of trazodone (25mg or 50mg) if complains of insomnia

## 2024-10-31 NOTE — PROGRESS NOTE ADULT - ATTENDING COMMENTS
I:   Cr 5.75.  A: MEG without significant imporvement. Poor renal function at baseline.  P: As he at risk for readmission with hyperkalemia or death from hyperkalemia/renal failure/poor followup, the safest course of action would be to start dialysis now.

## 2024-10-31 NOTE — PROGRESS NOTE ADULT - PROBLEM SELECTOR PLAN 10
Fluids - S/p 5% dextrose w/ bicarb  Electrolytes - Replete to Mg>2, Phos>3, K>4  Nutrition - Consistent carbohydrate  GI Prophylaxis -   VTE Prophylaxis - Heparin 5000U subq  Code Status - Full code Fluids - S/p 5% dextrose w/ bicarb  Electrolytes - Replete to Mg>2, Phos>3, K>4  Nutrition - Consistent carbohydrate  GI Prophylaxis - None  VTE Prophylaxis - Heparin 5000U subq  Code Status - Full code

## 2024-10-31 NOTE — PROGRESS NOTE ADULT - PROBLEM SELECTOR PLAN 7
Fluids -   Electrolytes - Replete to Mg>2, Phos>3, K>4  Nutrition - Consistent carbohydrate  GI Prophylaxis -   VTE Prophylaxis - Heparin 5000U subq  Code Status - Full code Per previous hospitalization, seems baseline around 9-10 per outpatient records, currently 11.    - No intervention at this time, continue to monitor

## 2024-10-31 NOTE — PROGRESS NOTE ADULT - ATTENDING COMMENTS
63 yo M with PMHx HTN, HLD, DM, CKD, hypothyroidism, hx toxic megacolon (s/p colostomy), etOH use BIBEMS from home 10/29 with 1-2d hx of dizziness and blurry vision, admitted to [facility]/telemetry for MEG on CKD c/b NAGMA requiring bicarb gtt, now stable for stepdown to [facility] for ongoing management and tentative plan for initiation of new HD.      At time of stepdown, VSS. EKG, labs, imaging, and infectious work-up reviewed. Hb slowly downtrending from admission 11.1 to 8.7. No signs/sx to suggest acute or active bleed. Suspect AOCD.  No significant electrolyte derangements on most recent labs. Bicarb 20. BUN/Cr remains elevated 66/5.67. Baseline Cr ~2.5. Renal following with concern for CKD progression to ESRD. Plan for initiation of HD.      [ ] Renal following (CKD progression to ESRD)   - Plan to initiate dialysis 11/1   - Bicarb gtt DC’d -> Resume home sodium bicarb    [ ] IR consult for HD catheter placement    - Hold DVT ppx   - T/S + PT/PTT/INR     [ ] SW evaluation (New HD)   - Complete hepatitis panel + QuantiFERON TB     [ ] PT evaluation (Home with HPT)

## 2024-10-31 NOTE — PROGRESS NOTE ADULT - PROBLEM SELECTOR PLAN 8
On home Levothyroxine 50mcg daily    - Continue home meds On home Levothyroxine 50mcg daily, but states that he is not currently taking it outside of the hospital.    - Continue home meds

## 2024-10-31 NOTE — PROGRESS NOTE ADULT - PROBLEM SELECTOR PLAN 4
Patient with T2DM, on basal bolus insulin regimen 20/7 at home. + at time of admission.  - lantus 5 BID  - mISS  - carb consistent diet  -pt last picked up insulin from pharmacy in jan 2024. At that time, pt dosing was 24 units lantus in the morning. Patient with T2DM, on basal bolus insulin regimen 20/7 at home. + at time of admission. Pt last picked up insulin from pharmacy in jan 2024. At that time, pt dosing was 24 units lantus in the morning. Has chronic peripheral neuropathy and takes Gabapentin 300mg BID at home.    - Lantus 12U qhs. Make AM due to CKD?  - Lispro 5U TID w/ meals  - mISS  - carb consistent diet  - Renally dosed Gabapentin 100mg daily Patient with T2DM, on basal bolus insulin regimen 20/7 at home. + at time of admission, A1c 9.2. Pt last picked up insulin from pharmacy in jan 2024. At that time, pt dosing was 24 units lantus in the morning. Reports that he is not currently taking Insulin at home. Has chronic peripheral neuropathy and takes Gabapentin 300mg BID at home.    - Lantus 12U qhs. Make AM due to CKD?  - Lispro 5U TID w/ meals  - mISS  - carb consistent diet  - Renally dosed Gabapentin 100mg daily Patient with T2DM, on basal bolus insulin regimen 20/7 at home. + at time of admission, A1c 9.2. Pt last picked up insulin from pharmacy in jan 2024. At that time, pt dosing was 24 units lantus in the morning. Reports that he is not currently taking Insulin at home. Has chronic peripheral neuropathy and takes Gabapentin 300mg BID at home.    - Lantus 12U qhs  - Lispro 5U TID w/ meals  - mISS  - carb consistent diet  - Renally dosed Gabapentin 100mg daily

## 2024-10-31 NOTE — PROGRESS NOTE ADULT - SUBJECTIVE AND OBJECTIVE BOX
INTERVAL HPI/OVERNIGHT EVENTS:  Patient was seen and examined at bedside. As per nurse and patient, no o/n events, patient resting comfortably. No complaints at this time. Patient denies: fever, chills, dizziness, weakness, HA, Changes in vision, CP, palpitations, SOB, cough, N/V/D/C, dysuria, changes in bowel movements, LE edema. ROS otherwise negative.    VITAL SIGNS:  T(F): 98 (10-31-24 @ 05:29)  HR: 44 (10-31-24 @ 05:02)  BP: 148/63 (10-31-24 @ 05:02)  RR: 16 (10-31-24 @ 05:02)  SpO2: 98% (10-31-24 @ 05:02)  Wt(kg): --      10-29-24 @ 07:01  -  10-30-24 @ 07:00  --------------------------------------------------------  IN: 0 mL / OUT: 300 mL / NET: -300 mL    10-30-24 @ 07:01  -  10-31-24 @ 06:59  --------------------------------------------------------  IN: 1500 mL / OUT: 1000 mL / NET: 500 mL        PHYSICAL EXAM:    Constitutional: resting comfortably in bed; NAD  HEENT: NC/AT, PERRL, EOMI, anicteric sclera, no nasal discharge; uvula midline, no oropharyngeal erythema or exudates; MMM  Neck: supple; no JVD or thyromegaly  Respiratory: CTA B/L; no W/R/R, no retractions  Cardiac: +S1/S2; RRR; no M/R/G; PMI non-displaced  Gastrointestinal: soft, NT/ND; no rebound or guarding; +BSx4  Genitourinary: normal external genitalia  Back: spine midline, no bony tenderness or step-offs; no CVAT B/L  Extremities: WWP, no clubbing or cyanosis; no peripheral edema  Musculoskeletal: NROM x4; no joint swelling, tenderness or erythema  Vascular: 2+ radial, DP/PT pulses B/L  Dermatologic: skin warm, dry and intact; no rashes, wounds, or scars  Lymphatic: no submandibular or cervical LAD  Neurologic: AAOx3; CNII-XII grossly intact; no focal deficits  Psychiatric: affect and characteristics of appearance, verbalizations, behaviors are appropriate, denies SI/HI/AH/VH    MEDICATIONS  (STANDING):  atorvastatin 10 milliGRAM(s) Oral at bedtime  dextrose 5% 1000 milliLiter(s) (100 mL/Hr) IV Continuous <Continuous>  dextrose 5%. 1000 milliLiter(s) (50 mL/Hr) IV Continuous <Continuous>  dextrose 5%. 1000 milliLiter(s) (100 mL/Hr) IV Continuous <Continuous>  dextrose 50% Injectable 25 Gram(s) IV Push once  dextrose 50% Injectable 12.5 Gram(s) IV Push once  dextrose 50% Injectable 25 Gram(s) IV Push once  glucagon  Injectable 1 milliGRAM(s) IntraMuscular once  heparin   Injectable 5000 Unit(s) SubCutaneous every 8 hours  influenza   Vaccine 0.5 milliLiter(s) IntraMuscular once  insulin glargine Injectable (LANTUS) 10 Unit(s) SubCutaneous at bedtime  insulin lispro (ADMELOG) corrective regimen sliding scale   SubCutaneous Before meals and at bedtime  insulin lispro Injectable (ADMELOG) 4 Unit(s) SubCutaneous three times a day before meals  levothyroxine 50 MICROGram(s) Oral daily  NIFEdipine XL 60 milliGRAM(s) Oral daily  tamsulosin 0.4 milliGRAM(s) Oral at bedtime  traZODone 100 milliGRAM(s) Oral at bedtime    MEDICATIONS  (PRN):  acetaminophen     Tablet .. 650 milliGRAM(s) Oral every 6 hours PRN Mild Pain (1 - 3)  dextrose Oral Gel 15 Gram(s) Oral once PRN Blood Glucose LESS THAN 70 milliGRAM(s)/deciliter      Allergies    No Known Allergies    Intolerances        LABS:                        8.7    5.05  )-----------( 139      ( 31 Oct 2024 05:30 )             24.4     10-30    129[L]  |  102  |  71[H]  ----------------------------<  378[H]  4.8   |  15[L]  |  5.72[H]    Ca    8.2[L]      30 Oct 2024 15:30  Phos  3.7     10-30  Mg     2.1     10-30    TPro  8.6[H]  /  Alb  3.5  /  TBili  0.5  /  DBili  x   /  AST  12[L]  /  ALT  11[L]  /  AlkPhos  162[H]  10-29    PT/INR - ( 31 Oct 2024 05:30 )   PT: 11.3 sec;   INR: 0.98          PTT - ( 31 Oct 2024 05:30 )  PTT:26.4 sec  Urinalysis Basic - ( 30 Oct 2024 15:30 )    Color: x / Appearance: x / SG: x / pH: x  Gluc: 378 mg/dL / Ketone: x  / Bili: x / Urobili: x   Blood: x / Protein: x / Nitrite: x   Leuk Esterase: x / RBC: x / WBC x   Sq Epi: x / Non Sq Epi: x / Bacteria: x        RADIOLOGY & ADDITIONAL TESTS:  Reviewed INTERVAL HPI/OVERNIGHT EVENTS:  Patient was seen and examined at bedside. As per nurse and patient, no o/n events, patient resting comfortably. No complaints at this time. Patient denies: fever, chills, dizziness, weakness, HA, Changes in vision, CP, palpitations, SOB, cough, N/V/D/C, dysuria, changes in bowel movements, LE edema. ROS otherwise negative.  Pt was michelle to the 40s o/n likely 2/2 to trazodone 100mg given for sleep. Stopped the trazodone       VITAL SIGNS:  T(F): 98 (10-31-24 @ 05:29)  HR: 44 (10-31-24 @ 05:02)  BP: 148/63 (10-31-24 @ 05:02)  RR: 16 (10-31-24 @ 05:02)  SpO2: 98% (10-31-24 @ 05:02)        10-29-24 @ 07:01  -  10-30-24 @ 07:00  --------------------------------------------------------  IN: 0 mL / OUT: 300 mL / NET: -300 mL    10-30-24 @ 07:01  -  10-31-24 @ 06:59  --------------------------------------------------------  IN: 1500 mL / OUT: 1000 mL / NET: 500 mL        PHYSICAL EXAM:    Constitutional: resting comfortably in bed; NAD  HEENT: NC/AT, PERRL, EOMI, anicteric sclera, no nasal discharge; uvula midline, no oropharyngeal erythema or exudates; MMM  Neck: supple  Respiratory: CTA B/L; no W/R/R, no retractions  Cardiac: +S1/S2; RRR; no M/R/G; PMI non-displaced  Gastrointestinal: soft, NT/ND; no rebound or guarding; +BSx4, +R ostomy with brown stool in place   Extremities: WWP, no clubbing or cyanosis; no peripheral edema, +R later 3 toes amputation   Musculoskeletal: NROM x4; no joint swelling, tenderness or erythema  Neurologic: AAOx3; CNII-XII grossly intact; no focal deficits  Psychiatric: affect and characteristics of appearance, verbalizations, behaviors are appropriate    MEDICATIONS  (STANDING):  atorvastatin 10 milliGRAM(s) Oral at bedtime  dextrose 5% 1000 milliLiter(s) (100 mL/Hr) IV Continuous <Continuous>  dextrose 5%. 1000 milliLiter(s) (50 mL/Hr) IV Continuous <Continuous>  dextrose 5%. 1000 milliLiter(s) (100 mL/Hr) IV Continuous <Continuous>  dextrose 50% Injectable 25 Gram(s) IV Push once  dextrose 50% Injectable 12.5 Gram(s) IV Push once  dextrose 50% Injectable 25 Gram(s) IV Push once  glucagon  Injectable 1 milliGRAM(s) IntraMuscular once  heparin   Injectable 5000 Unit(s) SubCutaneous every 8 hours  influenza   Vaccine 0.5 milliLiter(s) IntraMuscular once  insulin glargine Injectable (LANTUS) 10 Unit(s) SubCutaneous at bedtime  insulin lispro (ADMELOG) corrective regimen sliding scale   SubCutaneous Before meals and at bedtime  insulin lispro Injectable (ADMELOG) 4 Unit(s) SubCutaneous three times a day before meals  levothyroxine 50 MICROGram(s) Oral daily  NIFEdipine XL 60 milliGRAM(s) Oral daily  tamsulosin 0.4 milliGRAM(s) Oral at bedtime  traZODone 100 milliGRAM(s) Oral at bedtime    MEDICATIONS  (PRN):  acetaminophen     Tablet .. 650 milliGRAM(s) Oral every 6 hours PRN Mild Pain (1 - 3)  dextrose Oral Gel 15 Gram(s) Oral once PRN Blood Glucose LESS THAN 70 milliGRAM(s)/deciliter      Allergies    No Known Allergies    Intolerances        LABS:                        8.7    5.05  )-----------( 139      ( 31 Oct 2024 05:30 )             24.4     10-30    129[L]  |  102  |  71[H]  ----------------------------<  378[H]  4.8   |  15[L]  |  5.72[H]    Ca    8.2[L]      30 Oct 2024 15:30  Phos  3.7     10-30  Mg     2.1     10-30    TPro  8.6[H]  /  Alb  3.5  /  TBili  0.5  /  DBili  x   /  AST  12[L]  /  ALT  11[L]  /  AlkPhos  162[H]  10-29    PT/INR - ( 31 Oct 2024 05:30 )   PT: 11.3 sec;   INR: 0.98          PTT - ( 31 Oct 2024 05:30 )  PTT:26.4 sec  Urinalysis Basic - ( 30 Oct 2024 15:30 )    Color: x / Appearance: x / SG: x / pH: x  Gluc: 378 mg/dL / Ketone: x  / Bili: x / Urobili: x   Blood: x / Protein: x / Nitrite: x   Leuk Esterase: x / RBC: x / WBC x   Sq Epi: x / Non Sq Epi: x / Bacteria: x        RADIOLOGY & ADDITIONAL TESTS:  Reviewed Transfer Note  [facility] to [facility]    64 M pmhx HTN, hypothyroidism, HLD, T2DM, CKD 3 (baseline Cr 2.3-2.5), colostomy, toxic megacolon, EtOH use disorder (no cirrhosis, confirmed by RUQ US on this admission), presented for weakness and found to have HAGMA 2/2 MEG on CKD likely 2/2 poor PO intake, admitted to [facility] for bicarbonate drip. Per pt, he stopped eating and drinking to help control his diabetes and because he did not want to change his colostomy bag as often for financial reasons. When pt arrived, sodium was was 122 with HAGMA pH 7.19. Pt started on sodium bicarb ggt and 2L NS IV fluids. Pt sodium improved to 131 now and HAGMA and AMS/weakness resolved. Pt also found to have worsening kidney function, GFR<10 now. MEG on CKD likely pre-renal with component of intrinsic kidney failure.  Nephrology recommending dialysis for kidney failure.           INTERVAL HPI/OVERNIGHT EVENTS:  Patient was seen and examined at bedside. As per nurse and patient, no o/n events, patient resting comfortably. No complaints at this time. Patient denies: fever, chills, dizziness, weakness, HA, Changes in vision, CP, palpitations, SOB, cough, N/V/D/C, dysuria, changes in bowel movements, LE edema. ROS otherwise negative.  Pt was michelle to the 40s o/n likely 2/2 to trazodone 100mg given for sleep. Stopped the trazodone       VITAL SIGNS:  T(F): 98 (10-31-24 @ 05:29)  HR: 44 (10-31-24 @ 05:02)  BP: 148/63 (10-31-24 @ 05:02)  RR: 16 (10-31-24 @ 05:02)  SpO2: 98% (10-31-24 @ 05:02)        10-29-24 @ 07:01  -  10-30-24 @ 07:00  --------------------------------------------------------  IN: 0 mL / OUT: 300 mL / NET: -300 mL    10-30-24 @ 07:01  -  10-31-24 @ 06:59  --------------------------------------------------------  IN: 1500 mL / OUT: 1000 mL / NET: 500 mL        PHYSICAL EXAM:    Constitutional: resting comfortably in bed; NAD  HEENT: NC/AT, PERRL, EOMI, anicteric sclera, no nasal discharge; uvula midline, no oropharyngeal erythema or exudates; MMM  Neck: supple  Respiratory: CTA B/L; no W/R/R, no retractions  Cardiac: +S1/S2; RRR; no M/R/G; PMI non-displaced  Gastrointestinal: soft, NT/ND; no rebound or guarding; +BSx4, +R ostomy with brown stool in place   Extremities: WWP, no clubbing or cyanosis; no peripheral edema, +R later 3 toes amputation   Musculoskeletal: NROM x4; no joint swelling, tenderness or erythema  Neurologic: AAOx3; CNII-XII grossly intact; no focal deficits  Psychiatric: affect and characteristics of appearance, verbalizations, behaviors are appropriate    MEDICATIONS  (STANDING):  atorvastatin 10 milliGRAM(s) Oral at bedtime  dextrose 5% 1000 milliLiter(s) (100 mL/Hr) IV Continuous <Continuous>  dextrose 5%. 1000 milliLiter(s) (50 mL/Hr) IV Continuous <Continuous>  dextrose 5%. 1000 milliLiter(s) (100 mL/Hr) IV Continuous <Continuous>  dextrose 50% Injectable 25 Gram(s) IV Push once  dextrose 50% Injectable 12.5 Gram(s) IV Push once  dextrose 50% Injectable 25 Gram(s) IV Push once  glucagon  Injectable 1 milliGRAM(s) IntraMuscular once  heparin   Injectable 5000 Unit(s) SubCutaneous every 8 hours  influenza   Vaccine 0.5 milliLiter(s) IntraMuscular once  insulin glargine Injectable (LANTUS) 10 Unit(s) SubCutaneous at bedtime  insulin lispro (ADMELOG) corrective regimen sliding scale   SubCutaneous Before meals and at bedtime  insulin lispro Injectable (ADMELOG) 4 Unit(s) SubCutaneous three times a day before meals  levothyroxine 50 MICROGram(s) Oral daily  NIFEdipine XL 60 milliGRAM(s) Oral daily  tamsulosin 0.4 milliGRAM(s) Oral at bedtime  traZODone 100 milliGRAM(s) Oral at bedtime    MEDICATIONS  (PRN):  acetaminophen     Tablet .. 650 milliGRAM(s) Oral every 6 hours PRN Mild Pain (1 - 3)  dextrose Oral Gel 15 Gram(s) Oral once PRN Blood Glucose LESS THAN 70 milliGRAM(s)/deciliter      Allergies    No Known Allergies    Intolerances        LABS:                        8.7    5.05  )-----------( 139      ( 31 Oct 2024 05:30 )             24.4     10-30    129[L]  |  102  |  71[H]  ----------------------------<  378[H]  4.8   |  15[L]  |  5.72[H]    Ca    8.2[L]      30 Oct 2024 15:30  Phos  3.7     10-30  Mg     2.1     10-30    TPro  8.6[H]  /  Alb  3.5  /  TBili  0.5  /  DBili  x   /  AST  12[L]  /  ALT  11[L]  /  AlkPhos  162[H]  10-29    PT/INR - ( 31 Oct 2024 05:30 )   PT: 11.3 sec;   INR: 0.98          PTT - ( 31 Oct 2024 05:30 )  PTT:26.4 sec  Urinalysis Basic - ( 30 Oct 2024 15:30 )    Color: x / Appearance: x / SG: x / pH: x  Gluc: 378 mg/dL / Ketone: x  / Bili: x / Urobili: x   Blood: x / Protein: x / Nitrite: x   Leuk Esterase: x / RBC: x / WBC x   Sq Epi: x / Non Sq Epi: x / Bacteria: x        RADIOLOGY & ADDITIONAL TESTS:  Reviewed

## 2024-10-31 NOTE — PROGRESS NOTE ADULT - ASSESSMENT
Assessment  64 M pmhx HTN, hypothyroidism, HLD, T2DM, CKD 3 (baseline Cr 2.3-2.5), colostomy, toxic megacolon, EtOH use disorder, cirrhosis (?), presented for weakness and found to have HAGMA 2/2 MEG on CKD likely 2/2 poor PO intake, admitted to [facility] for bicarbonate drip, now improving s/p IV fluids.    NEURO  #Altered Mental Status, RESOLVED  Pt presented to ED with confusion. Utox negative, CT head negative. Patient markedly hypovolemic on physical exam in ED. Etiology likely 2/2 hypovolemia. Patient mental status resolved, now AOx3 s/p 2L NS.  - continue to monitor for acute changes in mental status    CARDIAC  #Hypovolemia  Pt hypovolemic on physical exam, given 2L NS in ED.  - continue with hydration as needed    PULMONARY  #CHANCE     RENAL  #Non-Anion Gap Metabolic Acidosis  Etiology likely uremia 2/2 MEG. Pt with pH 7.19 on VBG. AG 14, improved to 12 with improvement in BUN.  - c/w bicarb drip  - repeat VBG and BMP       #Hyperkalemia, RESOLVED  K 5.5 on admission, likely iso MEG. EKG showing no hyperkalemic changes.   - repeat BMP showed K+ 4.1, normalized     #Non-oliguric MEG on CKD  Pt with CKD 3 (baseline Cr 2.3-2.5), currently with Cr 6.47. Etiology likely pre-renal in setting poor PO intake.  - bladder scan  - urine studies  - UA shows bacteria and LE; however pt remains asymptomatic. no indication for treatment. ctm      #Hyponatremia  Presented with corrected Na 126. Pt maintaining Na+ 125 this AM   -f/u repeat BMP after sodium bicarb ggt   - goal 136 by 10p 10/30  - pt also had elevated protein gap >4 , f/u SPEP   - urine studies FENa 1.4% FEUrea 41.4% which suggest instrinsic renal disease leading to hyponatremia  -hyponatremia also may be from elevated glucose on admission   -correct glucose with insulin, c/w sodium bicarb   -f/u nephro recs     GI  #?Liver Cirrhosis   -Pt has reported questionable hx of liver cirrhosis. AST/ALT low which may represent normal liver or advanced liver cirrhosis.   -f/u SPEP iso elevated protein gap  -f/u US abdomen  -consider HRS if pt has liver cirrhosis as pt has worsening CKD.     ENDOCRINE  #Type 2 Diabetes Mellitus  Patient with T2DM, on basal bolus insulin regimen 20/7 at home. + at time of admission.  - lantus 5 BID  - mISS  - carb consistent diet  -pt last picked up insulin from pharmacy in jan 2024. At that time, pt dosing was 24 units lantus in the morning.     HEMEONC  #Normocytic anemia  Per previous hospitalization, seems baseline around 9-10 per outpatient records, currently 11.  - no intervention at this time, continue to monitor    ID  #CHANCE    PROPHYLAXIS  Diet: carb consistent  Electrolytes: replete cautiously  DVT: heparin subq 5000  Code: full  Dispo: 7 [facility] Assessment  64 M pmhx HTN, hypothyroidism, HLD, T2DM, CKD 3 (baseline Cr 2.3-2.5), colostomy, toxic megacolon, EtOH use disorder, cirrhosis (?), presented for weakness and found to have HAGMA 2/2 MEG on CKD likely 2/2 poor PO intake, admitted to [facility] for bicarbonate drip, now improving s/p IV fluids. Hyponatremia and AMS resolved.     NEURO  #Altered Mental Status, RESOLVED  Pt presented to ED with confusion. Utox negative, CT head negative. Patient markedly hypovolemic on physical exam in ED. Etiology likely 2/2 hypovolemia. Patient mental status resolved, now AOx3 s/p 2L NS.  - continue to monitor for acute changes in mental status    CARDIAC  #Hypovolemia  Pt hypovolemic on physical exam, given 2L NS in ED.  - continue with hydration as needed    #Bradycardia  -Pt HR in 40s o/n after trazodone given for sleep. Trazodone has cholinergic effects that can lead to bradycardia.  Pt HR improved to 60s during the day.   -HOLD trazodone, consider melatonin for sleep instead or lower dose of trazodone (25mg or 50mg)     PULMONARY  #CHANCE     RENAL  #Non-Anion Gap Metabolic Acidosis  Etiology likely uremia 2/2 MEG. Pt with pH 7.19 on VBG. AG 14, improved to 12 with improvement in BUN.  - repeat VBG and BMP wnl (VBG 10/30 7.33, Bicarb 20, BMP Na 131 this AM)     #Non-oliguric MEG on CKD  Pt with CKD 3 (baseline Cr 2.3-2.5), currently with Cr 6.47. Etiology likely pre-renal in setting poor PO intake.  - bladder scan  - urine studies  - UA shows bacteria and LE; however pt remains asymptomatic. no indication for treatment. ctm      #Hyponatremia, RESOLVED   Presented with corrected Na 126. Pt maintaining Na+ 131 this AM   -likely hypovolemic hyponatremia vs. instrinsic kidney disease from CKD   - pt also had elevated protein gap >4 , f/u SPEP   - urine studies FENa 1.4% FEUrea 41.4% which suggest instrinsic renal disease leading to hyponatremia  -hyponatremia also may be from elevated glucose on admission   -correct glucose with insulin  -f/u nephro recs     GI  #?Liver Cirrhosis   -Pt has reported questionable hx of liver cirrhosis. AST/ALT low which may represent normal liver or advanced liver cirrhosis.   -f/u SPEP iso elevated protein gap  -f/u US abdomen  -consider HRS if pt has liver cirrhosis as pt has worsening CKD.     ENDOCRINE  #Type 2 Diabetes Mellitus  Patient with T2DM, on basal bolus insulin regimen 20/7 at home. + at time of admission.  - lantus 5 BID  - mISS  - carb consistent diet  -pt last picked up insulin from pharmacy in jan 2024. At that time, pt dosing was 24 units lantus in the morning.     HEMEONC  #Normocytic anemia  Per previous hospitalization, seems baseline around 9-10 per outpatient records, currently 11.  - no intervention at this time, continue to monitor    ID  #CHANCE    PROPHYLAXIS  Diet: carb consistent  Electrolytes: replete cautiously  DVT: heparin subq 5000  Code: full  Dispo: [facility]  64 M pmhx HTN, hypothyroidism, HLD, T2DM, CKD 3 (baseline Cr 2.3-2.5), colostomy, toxic megacolon, EtOH use disorder, cirrhosis (?), presented for weakness and found to have HAGMA 2/2 MEG on CKD likely 2/2 poor PO intake, admitted to [facility] for bicarbonate drip, now improving s/p IV fluids. Hyponatremia and AMS resolved.     NEURO  #Altered Mental Status, RESOLVED  Pt presented to ED with confusion. Utox negative, CT head negative. Patient markedly hypovolemic on physical exam in ED. Etiology likely 2/2 hypovolemia. Patient mental status resolved, now AOx3 s/p 2L NS.  - continue to monitor for acute changes in mental status    #Peripheral Neuropathy   Pt has chronic peripheral neuropathy and takes gabapentin 300mg BID at home   - restarted renally dosed gabapentin 100mg daily     CARDIAC  #Hypovolemia  Pt hypovolemic on physical exam, given 2L NS in ED.  - continue with hydration as needed    #Bradycardia  -Pt HR in 40s o/n after trazodone given for sleep. Trazodone has cholinergic effects that can lead to bradycardia.  Pt HR improved to 60s during the day.   -HOLD trazodone, consider melatonin for sleep instead or lower dose of trazodone (25mg or 50mg)     PULMONARY  #CHANCE     RENAL  #Non-Anion Gap Metabolic Acidosis  Etiology likely uremia 2/2 MEG. Pt with pH 7.19 on VBG. AG 14, improved to 12 with improvement in BUN.  - repeat VBG and BMP wnl (VBG 10/30 7.33, Bicarb 20, BMP Na 131 this AM)     #Non-oliguric MEG on CKD  Pt with CKD 3 (baseline Cr 2.3-2.5), currently with Cr 6.47. Etiology likely pre-renal in setting poor PO intake.  - bladder scan  - urine studies  - UA shows bacteria and LE; however pt remains asymptomatic. no indication for treatment. ctm    -pt likely will require HD, consulted IR for HD tunnel cath placement today or tomorrow for HD as pt not likely to recover kidney function     #Hyponatremia, RESOLVED   Presented with corrected Na 126. Pt maintaining Na+ 131 this AM   -likely hypovolemic hyponatremia vs. instrinsic kidney disease from CKD   - pt also had elevated protein gap >4 , f/u SPEP   - urine studies FENa 1.4% FEUrea 41.4% which suggest instrinsic renal disease leading to hyponatremia  -hyponatremia also may be from elevated glucose on admission   -correct glucose with insulin  -f/u nephro recs     GI  #?Liver Cirrhosis   -Pt has reported questionable hx of liver cirrhosis. AST/ALT low which may represent normal liver or advanced liver cirrhosis.   -f/u SPEP iso elevated protein gap  -f/u US abdomen  -consider HRS if pt has liver cirrhosis as pt has worsening CKD.     ENDOCRINE  #Type 2 Diabetes Mellitus  Patient with T2DM, on basal bolus insulin regimen 20/7 at home. + at time of admission.  - lantus 5 BID  - mISS  - carb consistent diet  -pt last picked up insulin from pharmacy in jan 2024. At that time, pt dosing was 24 units lantus in the morning.     HEMEONC  #Normocytic anemia  Per previous hospitalization, seems baseline around 9-10 per outpatient records, currently 11.  - no intervention at this time, continue to monitor    ID  #CHANCE    PROPHYLAXIS  Diet: carb consistent  Electrolytes: replete cautiously  DVT: heparin subq 5000  Code: full  Dispo: [facility]

## 2024-10-31 NOTE — PROGRESS NOTE ADULT - SUBJECTIVE AND OBJECTIVE BOX
***Note in progress***    OVERNIGHT EVENTS: NAEO    SUBJECTIVE / INTERVAL HPI: Patient seen and examined at bedside. Patient denying chest pain, SOB, palpitations, cough. Patient denies fever, chills, HA, Dizziness, N/V, abdominal pain, diarrhea, constipation, hematochezia/melena, dysuria, hematuria, new onset weakness/numbness, LE pain and/or swelling.    Remaining ROS negative       PHYSICAL EXAM:    General:NAD.   HEENT: NC/AT; PERRL, anicteric sclera; MMM  Neck: supple  Cardiovascular: +S1/S2, RRR  Respiratory: CTA B/L; no W/R/R  Gastrointestinal: soft, NT/ND; +BSx4, +ileostomy bag in place draining brown stool, no signs of pus, purulent drainage, or blood in bag   Extremities: WWP; no edema, clubbing or cyanosis  Vascular: 2+ radial, DP/PT pulses B/L  Neurological: AAOx3; no focal deficits  Psychiatric: pleasant mood and affect  Dermatologic: no appreciable wounds or damage to the skin    VITAL SIGNS:  Vital Signs Last 24 Hrs  T(C): 36.7 (31 Oct 2024 05:29), Max: 37.2 (30 Oct 2024 13:29)  T(F): 98 (31 Oct 2024 05:29), Max: 99 (30 Oct 2024 17:00)  HR: 44 (31 Oct 2024 05:02) (44 - 80)  BP: 148/63 (31 Oct 2024 05:02) (134/63 - 168/73)  BP(mean): 91 (31 Oct 2024 05:02) (90 - 108)  RR: 16 (31 Oct 2024 05:02) (16 - 18)  SpO2: 98% (31 Oct 2024 05:02) (96% - 100%)    Parameters below as of 31 Oct 2024 05:02  Patient On (Oxygen Delivery Method): room air        MEDICATIONS:  MEDICATIONS  (STANDING):  atorvastatin 10 milliGRAM(s) Oral at bedtime  dextrose 5% 1000 milliLiter(s) (100 mL/Hr) IV Continuous <Continuous>  dextrose 5%. 1000 milliLiter(s) (100 mL/Hr) IV Continuous <Continuous>  dextrose 5%. 1000 milliLiter(s) (50 mL/Hr) IV Continuous <Continuous>  dextrose 50% Injectable 25 Gram(s) IV Push once  dextrose 50% Injectable 12.5 Gram(s) IV Push once  dextrose 50% Injectable 25 Gram(s) IV Push once  glucagon  Injectable 1 milliGRAM(s) IntraMuscular once  heparin   Injectable 5000 Unit(s) SubCutaneous every 8 hours  influenza   Vaccine 0.5 milliLiter(s) IntraMuscular once  insulin glargine Injectable (LANTUS) 10 Unit(s) SubCutaneous at bedtime  insulin lispro (ADMELOG) corrective regimen sliding scale   SubCutaneous Before meals and at bedtime  insulin lispro Injectable (ADMELOG) 4 Unit(s) SubCutaneous three times a day before meals  levothyroxine 50 MICROGram(s) Oral daily  NIFEdipine XL 60 milliGRAM(s) Oral daily  tamsulosin 0.4 milliGRAM(s) Oral at bedtime    MEDICATIONS  (PRN):  acetaminophen     Tablet .. 650 milliGRAM(s) Oral every 6 hours PRN Mild Pain (1 - 3)  dextrose Oral Gel 15 Gram(s) Oral once PRN Blood Glucose LESS THAN 70 milliGRAM(s)/deciliter      ALLERGIES:  Allergies    No Known Allergies    Intolerances        LABS:                        8.7    5.05  )-----------( 139      ( 31 Oct 2024 05:30 )             24.4     10-31    131[L]  |  100  |  67[H]  ----------------------------<  200[H]  4.0   |  20[L]  |  5.75[H]    Ca    8.3[L]      31 Oct 2024 05:30  Phos  3.8     10-31  Mg     1.8     10-31    TPro  7.1  /  Alb  3.5  /  TBili  0.2  /  DBili  x   /  AST  9[L]  /  ALT  7[L]  /  AlkPhos  126[H]  10-31    PT/INR - ( 31 Oct 2024 05:30 )   PT: 11.3 sec;   INR: 0.98          PTT - ( 31 Oct 2024 05:30 )  PTT:26.4 sec  Urinalysis Basic - ( 31 Oct 2024 05:30 )    Color: x / Appearance: x / SG: x / pH: x  Gluc: 200 mg/dL / Ketone: x  / Bili: x / Urobili: x   Blood: x / Protein: x / Nitrite: x   Leuk Esterase: x / RBC: x / WBC x   Sq Epi: x / Non Sq Epi: x / Bacteria: x      CAPILLARY BLOOD GLUCOSE      POCT Blood Glucose.: 203 mg/dL (31 Oct 2024 06:25)      RADIOLOGY & ADDITIONAL TESTS: Reviewed. SUBJECTIVE / INTERVAL HPI: Patient seen and examined at bedside. Feels well overall. Patient denying chest pain, SOB, palpitations, cough. Patient denies fever, chills, HA, Dizziness, N/V, abdominal pain, diarrhea, constipation, hematochezia/melena, dysuria, hematuria, new onset weakness/numbness, LE pain and/or swelling.     Remaining ROS negative       PHYSICAL EXAM:    General:NAD.   HEENT: NC/AT; PERRL, anicteric sclera; MMM  Neck: supple  Cardiovascular: +S1/S2, RRR  Respiratory: CTA B/L; no W/R/R  Gastrointestinal: soft, NT/ND; +BSx4, +ileostomy bag in place draining brown stool, no signs of pus, purulent drainage, or blood in bag,   Extremities: WWP; no edema, clubbing or cyanosis, no asterixis   Neurological: AAOx3;  Psychiatric: pleasant mood and affect  Dermatologic: no appreciable wounds or damage to the skin    VITAL SIGNS:  Vital Signs Last 24 Hrs  T(C): 36.7 (31 Oct 2024 05:29), Max: 37.2 (30 Oct 2024 13:29)  T(F): 98 (31 Oct 2024 05:29), Max: 99 (30 Oct 2024 17:00)  HR: 44 (31 Oct 2024 05:02) (44 - 80)  BP: 148/63 (31 Oct 2024 05:02) (134/63 - 168/73)  BP(mean): 91 (31 Oct 2024 05:02) (90 - 108)  RR: 16 (31 Oct 2024 05:02) (16 - 18)  SpO2: 98% (31 Oct 2024 05:02) (96% - 100%)    Parameters below as of 31 Oct 2024 05:02  Patient On (Oxygen Delivery Method): room air        MEDICATIONS:  MEDICATIONS  (STANDING):  atorvastatin 10 milliGRAM(s) Oral at bedtime  dextrose 5% 1000 milliLiter(s) (100 mL/Hr) IV Continuous <Continuous>  dextrose 5%. 1000 milliLiter(s) (100 mL/Hr) IV Continuous <Continuous>  dextrose 5%. 1000 milliLiter(s) (50 mL/Hr) IV Continuous <Continuous>  dextrose 50% Injectable 25 Gram(s) IV Push once  dextrose 50% Injectable 12.5 Gram(s) IV Push once  dextrose 50% Injectable 25 Gram(s) IV Push once  glucagon  Injectable 1 milliGRAM(s) IntraMuscular once  heparin   Injectable 5000 Unit(s) SubCutaneous every 8 hours  influenza   Vaccine 0.5 milliLiter(s) IntraMuscular once  insulin glargine Injectable (LANTUS) 10 Unit(s) SubCutaneous at bedtime  insulin lispro (ADMELOG) corrective regimen sliding scale   SubCutaneous Before meals and at bedtime  insulin lispro Injectable (ADMELOG) 4 Unit(s) SubCutaneous three times a day before meals  levothyroxine 50 MICROGram(s) Oral daily  NIFEdipine XL 60 milliGRAM(s) Oral daily  tamsulosin 0.4 milliGRAM(s) Oral at bedtime    MEDICATIONS  (PRN):  acetaminophen     Tablet .. 650 milliGRAM(s) Oral every 6 hours PRN Mild Pain (1 - 3)  dextrose Oral Gel 15 Gram(s) Oral once PRN Blood Glucose LESS THAN 70 milliGRAM(s)/deciliter      ALLERGIES:  Allergies    No Known Allergies    Intolerances        LABS:                        8.7    5.05  )-----------( 139      ( 31 Oct 2024 05:30 )             24.4     10-31    131[L]  |  100  |  67[H]  ----------------------------<  200[H]  4.0   |  20[L]  |  5.75[H]    Ca    8.3[L]      31 Oct 2024 05:30  Phos  3.8     10-31  Mg     1.8     10-31    TPro  7.1  /  Alb  3.5  /  TBili  0.2  /  DBili  x   /  AST  9[L]  /  ALT  7[L]  /  AlkPhos  126[H]  10-31    PT/INR - ( 31 Oct 2024 05:30 )   PT: 11.3 sec;   INR: 0.98          PTT - ( 31 Oct 2024 05:30 )  PTT:26.4 sec  Urinalysis Basic - ( 31 Oct 2024 05:30 )    Color: x / Appearance: x / SG: x / pH: x  Gluc: 200 mg/dL / Ketone: x  / Bili: x / Urobili: x   Blood: x / Protein: x / Nitrite: x   Leuk Esterase: x / RBC: x / WBC x   Sq Epi: x / Non Sq Epi: x / Bacteria: x      CAPILLARY BLOOD GLUCOSE      POCT Blood Glucose.: 203 mg/dL (31 Oct 2024 06:25)      RADIOLOGY & ADDITIONAL TESTS: Reviewed.

## 2024-10-31 NOTE — PROGRESS NOTE ADULT - PROBLEM SELECTOR PLAN 1
Pt with CKD 3 (baseline Cr 2.3-2.5), currently with Cr 6.47. Etiology likely pre-renal in setting poor PO intake.  - bladder scan  - urine studies  - UA shows bacteria and LE; however pt remains asymptomatic. no indication for treatment. ctm    -pt likely will require HD, consulted IR for HD tunnel cath placement today or tomorrow for HD as pt not likely to recover kidney function Pt with CKD 3 (baseline Cr 2.3-2.5), currently with Cr 6.47. Etiology likely pre-renal in setting poor PO intake. Patient will likely need HD as likely not to recover kidney function.   UA shows bacteria and LE; however pt remains asymptomatic. no indication for treatment.    - Nephro, IR consulted, recs appreciated  - Plan for HD tunnel cath placement 11/1 with IR

## 2024-10-31 NOTE — PROGRESS NOTE ADULT - PROBLEM SELECTOR PLAN 5
Per previous hospitalization, seems baseline around 9-10 per outpatient records, currently 11.  - no intervention at this time, continue to monitor On home Nifedipine 90mg daily    - Continue Nifedipine 60mg daily On home Nifedipine 60mg daily    - Continue home meds On home Nifedipine 60mg daily, but states that he is not currently taking it outside of the hospital.    - Continue home meds

## 2024-10-31 NOTE — PROGRESS NOTE ADULT - ASSESSMENT
62 YOM PMHx IDDMII, hypothyroidism, hypertension, s/p colostomy, previous issues with electrolyte disturbances inc hyponatremia, CKD III baseline sCr 2.1-2.5, presents to ED from [facility] with confusion, ostomy bag break, unable to provide history.  Found to have UTI, hyperkalemia.  Baseline renal function seems to be around sCr of 2.1-2.5.  Nephrology consulted for MEG on CKD.  Hyponatremia improving. Patient asymptomatic. Cr remains elevated without much improvement over 24 hours.           #Non-Anion Gap Metabolic Acidosis   - Acidosis improving s/p administration of sodium bicarb infusions. Repeat BMP with CO2: 20, increased from 15, yesterday.   - Normal Anion GAP    - VBG on 10/31: pH:7.33, PCO2: 37. pH improving, CO2 still below normal expected values for VBG. No resipiratory distress noticed. Still not fully compensated by improving. Less concern about patient developing respiratory distress from lack of respiratiory compensation   - Losses of bicarb include losses through  stool in ileostomy bag. Less likely etiology include vomiting, normal chloride and denied symptoms.   Plan   - recommend to continue D5w drip with 3ams of NaHCO3 until acidosis resolves   - Continue to trend BMP       #MEG on CKD   Patient noted to have MEG on admission with  Cr 6.47 over baseline 2.5. Remains elevated today at 5.75. Initially thought to be  pre-renal 2/2 hypovolemia given Deloris<20, recent decreased PO intake as well as slight improvement in Cr after 2L of fluid.   - Initial concern for hepatorenal syndrome as patient was noted to have possible evidence of cirrhosis on CT abdomen pelvis from 2020 however repeat CT on this admission and in 2022 with not signs of cirrhosis or hepatic injury. Going for CT abdomen pelvis today. Cannot exclude Hepatorenal syndrome given no improvement after additional fluids.     Recommendations   - BMP  - trend Cr  - f/u CT abdomen/pelvis   - avoid nephrotoxic drugs, renally dose meds  - f/u Urine Lytes   - Albumin @ ************      #Hyponatremia   - Corrected sodium of 133 today   - Goal Na: 137 by 5am on 11/1    62 YOM PMHx IDDMII, hypothyroidism, hypertension, s/p colostomy, previous issues with electrolyte disturbances inc hyponatremia, CKD III baseline sCr 2.1-2.5, presents to ED from [facility] with confusion, ostomy bag break, unable to provide history.  Found to have UTI, hyperkalemia.  Baseline renal function seems to be around sCr of 2.1-2.5.  Nephrology consulted for MEG on CKD.  Hyponatremia improving. Patient asymptomatic. Cr remains elevated without much improvement over 24 hours.       #MEG on CKD   Patient noted to have MEG on admission with  Cr 6.47 over baseline 2.5. Remains elevated today at 5.75. Initially thought to be  pre-renal 2/2 hypovolemia given Deloris<20, recent decreased PO intake as well as slight improvement in Cr after 2L of fluid.   - Initial concern for hepatorenal syndrome as patient was noted to have possible evidence of cirrhosis on CT abdomen pelvis from 2020 however repeat CT on this admission and in 2022 with not signs of cirrhosis or hepatic injury.   - Concern for progression to ESRD. Patient presented with collapse after leg weakness and shaking. Potassium on admission 5.5, however on multiple prior admissions to [facility] and [facility] he has had potassium ranges as high as 7.0 & 8.0. Cr has remained elevated at close to 3.0 on all prior admission in the last year, likely close to his actual baseline. On discussion this morning he reports multiple prior episodes of syncope and collapse while at home. During this admission the risks of worsening prognosis and concern for high likelihood of collapse at home were discussed given his difficulty with outpatient followups and reported poor diet at home including high amounts of bananas and other high potassium foods.   - Desire is to get him into the system and start dialysis now.   - Patient understood and agreed to begin the process     Recommendations:    - BMP  - trend Cr  - will obtain consent for dialysis today. Will need dialysis catheter placement today or tomorrow.   - avoid nephrotoxic drugs, renally dose meds  - f/u Urine Lytes         #Non-Anion Gap Metabolic Acidosis   - ****Improving*******  - Acidosis improving s/p administration of sodium bicarb infusions. Repeat BMP with CO2: 20, increased from 15, yesterday.   - Normal Anion GAP    - VBG on 10/31: pH:7.33, PCO2: 37. pH improving, CO2 still below normal expected values for VBG. No respiratory distress noticed. Still not fully compensated by improving. Less concern about patient developing respiratory distress from lack of respiratory compensation   - Losses of bicarb include losses through  stool in ileostomy bag. Less likely etiology include vomiting, normal chloride and denied symptoms.     Recommendations:     - Can stop bicarb drips and resume home sodium bicarb tabs   - Continue to trend BMP         #Hyponatremia   - Corrected sodium of 133 today, improved s/p NaHCO3 tabs in D5W   - Goal Na: 137 by 5am on 11/1     Recommendations:   - restart home sodium bicarb tabs   - continue to monitor

## 2024-10-31 NOTE — PROGRESS NOTE ADULT - PROBLEM SELECTOR PLAN 2
#Altered Mental Status, RESOLVED  Pt presented to ED with confusion. Utox negative, CT head negative. Patient markedly hypovolemic on physical exam in ED. Etiology likely 2/2 hypovolemia. Patient mental status resolved, now AOx3 s/p 2L NS.  - continue to monitor for acute changes in mental status IMPROVED. Etiology likely uremia 2/2 MEG. Pt with pH 7.19 on VBG. AG 14, improved to 12 with improvement in BUN. Repeat VBG and BMP wnl (VBG 10/30 7.33, Bicarb 20, BMP Na 131 this AM)    - Nephro recs appreciated  - Restart home bicarb tabs TID

## 2024-10-31 NOTE — ADVANCED PRACTICE NURSE CONSULT - ASSESSMENT
64 M pmhx HTN, hypothyroidism, HLD, T2DM, CKD 3 (baseline Cr 2.3-2.5), colostomy, toxic megacolon, EtOH use disorder, cirrhosis (?), presented for weakness and found to have HAGMA 2/2 MEG on CKD likely 2/2 poor PO intake, admitted to [facility] for bicarbonate drip, now improving s/p IV fluids. Pt known to [facility] service from previous admits. Ostomy appliance intact over stoma on right mid abdomen with medium brown semi-soft stool in pouch. Pt knowledgeable of care of stoma, requests extra supplies, which were brought to bedside (2 months worth).

## 2024-10-31 NOTE — PROGRESS NOTE ADULT - ASSESSMENT
64 M pmhx HTN, hypothyroidism, HLD, T2DM, CKD 3 (baseline Cr 2.3-2.5), colostomy, toxic megacolon, EtOH use disorder, cirrhosis (?), presented for weakness and found to have HAGMA 2/2 MEG on CKD likely 2/2 poor PO intake, admitted to [facility] for bicarbonate drip, now improving s/p IV fluids. Hyponatremia and AMS resolved.

## 2024-10-31 NOTE — PROGRESS NOTE ADULT - SUBJECTIVE AND OBJECTIVE BOX
***INCOMPLETE NOTE***    Patient is a 64y old  Male who presents with a chief complaint of weakness (30 Oct 2024 14:06)    ---------------------------TRANSFER FROM [facility] TO [facility]---------------------------    HOSPITAL COURSE:  64 M pmhx HTN, hypothyroidism, HLD, T2DM, CKD 3 (baseline Cr 2.3-2.5), colostomy, toxic megacolon, EtOH use disorder (no cirrhosis, confirmed by RUQ US on this admission), presented for weakness and found to have HAGMA 2/2 MEG on CKD likely 2/2 poor PO intake, admitted to [facility] for bicarbonate drip. Per pt, he stopped eating and drinking to help control his diabetes and because he did not want to change his colostomy bag as often for financial reasons. When pt arrived, sodium was was 122 with HAGMA pH 7.19. Pt started on sodium bicarb ggt and 2L NS IV fluids. Pt sodium improved to 131 now and HAGMA and AMS/weakness resolved. Pt also found to have worsening kidney function, GFR<10 now. MEG on CKD likely pre-renal with component of intrinsic kidney failure.  Nephrology recommending dialysis for kidney failure.     SUBJECTIVE: Patient seen and examined this evening.    All other review of systems negative except otherwise noted in HPI above.    MEDICATIONS  (STANDING):  atorvastatin 10 milliGRAM(s) Oral at bedtime  dextrose 5% 1000 milliLiter(s) (100 mL/Hr) IV Continuous <Continuous>  dextrose 5%. 1000 milliLiter(s) (100 mL/Hr) IV Continuous <Continuous>  dextrose 5%. 1000 milliLiter(s) (50 mL/Hr) IV Continuous <Continuous>  dextrose 50% Injectable 25 Gram(s) IV Push once  dextrose 50% Injectable 12.5 Gram(s) IV Push once  dextrose 50% Injectable 25 Gram(s) IV Push once  epoetin martine-epbx (RETACRIT) Injectable 7000 Unit(s) IV Push once  gabapentin 100 milliGRAM(s) Oral daily  glucagon  Injectable 1 milliGRAM(s) IntraMuscular once  heparin   Injectable 5000 Unit(s) SubCutaneous every 8 hours  influenza   Vaccine 0.5 milliLiter(s) IntraMuscular once  insulin glargine Injectable (LANTUS) 12 Unit(s) SubCutaneous at bedtime  insulin lispro (ADMELOG) corrective regimen sliding scale   SubCutaneous Before meals and at bedtime  insulin lispro Injectable (ADMELOG) 5 Unit(s) SubCutaneous three times a day before meals  levothyroxine 50 MICROGram(s) Oral daily  NIFEdipine XL 60 milliGRAM(s) Oral every 24 hours  sodium bicarbonate 650 milliGRAM(s) Oral every 8 hours  tamsulosin 0.4 milliGRAM(s) Oral at bedtime    MEDICATIONS  (PRN):  acetaminophen     Tablet .. 650 milliGRAM(s) Oral every 6 hours PRN Mild Pain (1 - 3)  dextrose Oral Gel 15 Gram(s) Oral once PRN Blood Glucose LESS THAN 70 milliGRAM(s)/deciliter    Allergies    No Known Allergies    Intolerances      Vital Signs Last 24 Hrs  T(C): 36.5 (31 Oct 2024 17:45), Max: 37 (31 Oct 2024 00:23)  T(F): 97.7 (31 Oct 2024 17:45), Max: 98.6 (31 Oct 2024 00:23)  HR: 76 (31 Oct 2024 15:33) (44 - 78)  BP: 187/82 (31 Oct 2024 15:33) (138/63 - 187/82)  BP(mean): 118 (31 Oct 2024 15:33) (90 - 118)  RR: 18 (31 Oct 2024 15:33) (16 - 18)  SpO2: 97% (31 Oct 2024 15:33) (96% - 99%)    Parameters below as of 31 Oct 2024 15:33  Patient On (Oxygen Delivery Method): room air      PHYSICAL EXAM:  Constitutional - NAD, Comfortable  HEENT - NCAT, EOMI  Neck - No limited ROM  Chest - Breathing comfortably on room air, CTAB   Cardio - Warm and well perfused, RRR, no murmur  Abdomen -  Soft, NTND  Extremities - No peripheral edema, No calf tenderness   Neurologic Exam:                    Cognitive -             Orientation: Awake, Alert, AAO to self, place, date, year, situation            Attention:  Days of week, recall 3 objects without cuing            Memory: Recent/ remote memory intact            Thought: process, content appropriate     Speech - Fluent, Comprehensible, No dysarthria, No aphasia      Cranial Nerves - No facial asymmetry, Tongue midline, EOMI, Shoulder shrug intact     Motor -                      LEFT    UE - ShAB 5/5, EF 5/5, EE 5/5, WE 5/5,  WNL                    RIGHT UE - ShAB 5/5, EF 5/5, EE 5/5, WE 5/5,  WNL                    LEFT    LE - HF 5/5, KE 5/5, DF 5/5, PF 5/5                    RIGHT LE - HF 5/5, KE 5/5, DF 5/5, PF 5/5        Sensory - Intact to LT bilateral     Reflexes - DTR _ / 4 , neg See's b/l, neg Babinski's b/l     Coordination - FTN / HTS intact     OculoVestibular -  No nystagmus  Psychiatric - Mood stable, Affect WNL  Skin on admission:    LABS:                        8.7    5.05  )-----------( 139      ( 31 Oct 2024 05:30 )             24.4     31 Oct 2024 15:02    132    |  105    |  66     ----------------------------<  133    4.1     |  20     |  5.67     Ca    8.5        31 Oct 2024 15:02  Phos  3.8       31 Oct 2024 05:30  Mg     1.8       31 Oct 2024 05:30    TPro  7.1    /  Alb  3.5    /  TBili  0.2    /  DBili  x      /  AST  9      /  ALT  7      /  AlkPhos  126    31 Oct 2024 05:30    PT/INR - ( 31 Oct 2024 05:30 )   PT: 11.3 sec;   INR: 0.98          PTT - ( 31 Oct 2024 05:30 )  PTT:26.4 sec  CAPILLARY BLOOD GLUCOSE      POCT Blood Glucose.: 359 mg/dL (31 Oct 2024 16:35)  POCT Blood Glucose.: 126 mg/dL (31 Oct 2024 11:12)  POCT Blood Glucose.: 203 mg/dL (31 Oct 2024 06:25)  POCT Blood Glucose.: 318 mg/dL (30 Oct 2024 22:30)    BLOOD CULTURE    RADIOLOGY & ADDITIONAL TESTS:    Imaging Personally Reviewed:  [ ] YES     Consultant(s) Notes Reviewed:      Care Discussed with Consultants/Other Providers: ***INCOMPLETE NOTE***    Patient is a 64y old  Male who presents with a chief complaint of weakness (30 Oct 2024 14:06)    ---------------------------TRANSFER FROM [facility] TO [facility]---------------------------    HOSPITAL COURSE:  64 M pmhx HTN, hypothyroidism, HLD, T2DM, CKD 3 (baseline Cr 2.3-2.5), colostomy, toxic megacolon, EtOH use disorder (no cirrhosis, confirmed by RUQ US on this admission), presented for weakness and found to have HAGMA 2/2 MEG on CKD likely 2/2 poor PO intake, admitted to [facility] for bicarbonate drip. Per pt, he stopped eating and drinking to help control his diabetes and because he did not want to change his colostomy bag as often for financial reasons. When pt arrived, sodium was was 122 with HAGMA pH 7.19. Pt started on sodium bicarb ggt and 2L NS IV fluids. Pt sodium improved to 131 now and HAGMA and AMS/weakness resolved. Pt also found to have worsening kidney function, GFR<10 now. MEG on CKD likely pre-renal with component of intrinsic kidney failure. Nephrology recommending dialysis for kidney failure.     SUBJECTIVE: Patient seen and examined this evening.    All other review of systems negative except otherwise noted in HPI above.    MEDICATIONS  (STANDING):  atorvastatin 10 milliGRAM(s) Oral at bedtime  dextrose 5% 1000 milliLiter(s) (100 mL/Hr) IV Continuous <Continuous>  dextrose 5%. 1000 milliLiter(s) (100 mL/Hr) IV Continuous <Continuous>  dextrose 5%. 1000 milliLiter(s) (50 mL/Hr) IV Continuous <Continuous>  dextrose 50% Injectable 25 Gram(s) IV Push once  dextrose 50% Injectable 12.5 Gram(s) IV Push once  dextrose 50% Injectable 25 Gram(s) IV Push once  epoetin martine-epbx (RETACRIT) Injectable 7000 Unit(s) IV Push once  gabapentin 100 milliGRAM(s) Oral daily  glucagon  Injectable 1 milliGRAM(s) IntraMuscular once  heparin   Injectable 5000 Unit(s) SubCutaneous every 8 hours  influenza   Vaccine 0.5 milliLiter(s) IntraMuscular once  insulin glargine Injectable (LANTUS) 12 Unit(s) SubCutaneous at bedtime  insulin lispro (ADMELOG) corrective regimen sliding scale   SubCutaneous Before meals and at bedtime  insulin lispro Injectable (ADMELOG) 5 Unit(s) SubCutaneous three times a day before meals  levothyroxine 50 MICROGram(s) Oral daily  NIFEdipine XL 60 milliGRAM(s) Oral every 24 hours  sodium bicarbonate 650 milliGRAM(s) Oral every 8 hours  tamsulosin 0.4 milliGRAM(s) Oral at bedtime    MEDICATIONS  (PRN):  acetaminophen     Tablet .. 650 milliGRAM(s) Oral every 6 hours PRN Mild Pain (1 - 3)  dextrose Oral Gel 15 Gram(s) Oral once PRN Blood Glucose LESS THAN 70 milliGRAM(s)/deciliter    Allergies    No Known Allergies    Intolerances      Vital Signs Last 24 Hrs  T(C): 36.5 (31 Oct 2024 17:45), Max: 37 (31 Oct 2024 00:23)  T(F): 97.7 (31 Oct 2024 17:45), Max: 98.6 (31 Oct 2024 00:23)  HR: 76 (31 Oct 2024 15:33) (44 - 78)  BP: 187/82 (31 Oct 2024 15:33) (138/63 - 187/82)  BP(mean): 118 (31 Oct 2024 15:33) (90 - 118)  RR: 18 (31 Oct 2024 15:33) (16 - 18)  SpO2: 97% (31 Oct 2024 15:33) (96% - 99%)    Parameters below as of 31 Oct 2024 15:33  Patient On (Oxygen Delivery Method): room air      PHYSICAL EXAM:  Constitutional - NAD, Comfortable  HEENT - NCAT, EOMI  Neck - No limited ROM  Chest - Breathing comfortably on room air, CTAB   Cardio - Warm and well perfused, RRR, no murmur  Abdomen -  Soft, NTND  Extremities - No peripheral edema, No calf tenderness   Neurologic Exam:                    Cognitive -             Orientation: Awake, Alert, AAO to self, place, date, year, situation            Attention:  Days of week, recall 3 objects without cuing            Memory: Recent/ remote memory intact            Thought: process, content appropriate     Speech - Fluent, Comprehensible, No dysarthria, No aphasia      Cranial Nerves - No facial asymmetry, Tongue midline, EOMI, Shoulder shrug intact     Motor -                      LEFT    UE - ShAB 5/5, EF 5/5, EE 5/5, WE 5/5,  WNL                    RIGHT UE - ShAB 5/5, EF 5/5, EE 5/5, WE 5/5,  WNL                    LEFT    LE - HF 5/5, KE 5/5, DF 5/5, PF 5/5                    RIGHT LE - HF 5/5, KE 5/5, DF 5/5, PF 5/5        Sensory - Intact to LT bilateral     Reflexes - DTR _ / 4 , neg See's b/l, neg Babinski's b/l     Coordination - FTN / HTS intact     OculoVestibular -  No nystagmus  Psychiatric - Mood stable, Affect WNL  Skin on admission:    LABS:                        8.7    5.05  )-----------( 139      ( 31 Oct 2024 05:30 )             24.4     31 Oct 2024 15:02    132    |  105    |  66     ----------------------------<  133    4.1     |  20     |  5.67     Ca    8.5        31 Oct 2024 15:02  Phos  3.8       31 Oct 2024 05:30  Mg     1.8       31 Oct 2024 05:30    TPro  7.1    /  Alb  3.5    /  TBili  0.2    /  DBili  x      /  AST  9      /  ALT  7      /  AlkPhos  126    31 Oct 2024 05:30    PT/INR - ( 31 Oct 2024 05:30 )   PT: 11.3 sec;   INR: 0.98          PTT - ( 31 Oct 2024 05:30 )  PTT:26.4 sec  CAPILLARY BLOOD GLUCOSE      POCT Blood Glucose.: 359 mg/dL (31 Oct 2024 16:35)  POCT Blood Glucose.: 126 mg/dL (31 Oct 2024 11:12)  POCT Blood Glucose.: 203 mg/dL (31 Oct 2024 06:25)  POCT Blood Glucose.: 318 mg/dL (30 Oct 2024 22:30)    BLOOD CULTURE    RADIOLOGY & ADDITIONAL TESTS:    Imaging Personally Reviewed:  [ ] YES     Consultant(s) Notes Reviewed:      Care Discussed with Consultants/Other Providers: ***INCOMPLETE NOTE***    Patient is a 64y old  Male who presents with a chief complaint of weakness (30 Oct 2024 14:06)    ---------------------------TRANSFER FROM [facility] TO [facility]---------------------------    HOSPITAL COURSE:  64 M pmhx HTN, hypothyroidism, HLD, T2DM, CKD 3 (baseline Cr 2.3-2.5), colostomy, toxic megacolon, EtOH use disorder (no cirrhosis, confirmed by RUQ US on this admission), presented for weakness and found to have HAGMA 2/2 MEG on CKD likely 2/2 poor PO intake, admitted to [facility] for bicarbonate drip. Per pt, he stopped eating and drinking to help control his diabetes and because he did not want to change his colostomy bag as often for financial reasons. When pt arrived, sodium was was 122 with NAGMA pH 7.19. Pt started on sodium bicarb ggt and 2L NS IV fluids. Pt sodium improved to 131 now and HAGMA and AMS/weakness resolved. Pt also found to have worsening kidney function, GFR<10 now. MEG on CKD likely pre-renal with component of intrinsic kidney failure. Nephrology recommending dialysis for kidney failure.     SUBJECTIVE: Patient seen and examined this evening.    All other review of systems negative except otherwise noted in HPI above.    MEDICATIONS  (STANDING):  atorvastatin 10 milliGRAM(s) Oral at bedtime  dextrose 5% 1000 milliLiter(s) (100 mL/Hr) IV Continuous <Continuous>  dextrose 5%. 1000 milliLiter(s) (100 mL/Hr) IV Continuous <Continuous>  dextrose 5%. 1000 milliLiter(s) (50 mL/Hr) IV Continuous <Continuous>  dextrose 50% Injectable 25 Gram(s) IV Push once  dextrose 50% Injectable 12.5 Gram(s) IV Push once  dextrose 50% Injectable 25 Gram(s) IV Push once  epoetin martine-epbx (RETACRIT) Injectable 7000 Unit(s) IV Push once  gabapentin 100 milliGRAM(s) Oral daily  glucagon  Injectable 1 milliGRAM(s) IntraMuscular once  heparin   Injectable 5000 Unit(s) SubCutaneous every 8 hours  influenza   Vaccine 0.5 milliLiter(s) IntraMuscular once  insulin glargine Injectable (LANTUS) 12 Unit(s) SubCutaneous at bedtime  insulin lispro (ADMELOG) corrective regimen sliding scale   SubCutaneous Before meals and at bedtime  insulin lispro Injectable (ADMELOG) 5 Unit(s) SubCutaneous three times a day before meals  levothyroxine 50 MICROGram(s) Oral daily  NIFEdipine XL 60 milliGRAM(s) Oral every 24 hours  sodium bicarbonate 650 milliGRAM(s) Oral every 8 hours  tamsulosin 0.4 milliGRAM(s) Oral at bedtime    MEDICATIONS  (PRN):  acetaminophen     Tablet .. 650 milliGRAM(s) Oral every 6 hours PRN Mild Pain (1 - 3)  dextrose Oral Gel 15 Gram(s) Oral once PRN Blood Glucose LESS THAN 70 milliGRAM(s)/deciliter    Allergies    No Known Allergies    Intolerances      Vital Signs Last 24 Hrs  T(C): 36.5 (31 Oct 2024 17:45), Max: 37 (31 Oct 2024 00:23)  T(F): 97.7 (31 Oct 2024 17:45), Max: 98.6 (31 Oct 2024 00:23)  HR: 76 (31 Oct 2024 15:33) (44 - 78)  BP: 187/82 (31 Oct 2024 15:33) (138/63 - 187/82)  BP(mean): 118 (31 Oct 2024 15:33) (90 - 118)  RR: 18 (31 Oct 2024 15:33) (16 - 18)  SpO2: 97% (31 Oct 2024 15:33) (96% - 99%)    Parameters below as of 31 Oct 2024 15:33  Patient On (Oxygen Delivery Method): room air      PHYSICAL EXAM:  Constitutional - NAD, Comfortable  HEENT - NCAT, EOMI  Neck - No limited ROM  Chest - Breathing comfortably on room air, CTAB   Cardio - Warm and well perfused, RRR, no murmur  Abdomen -  Soft, NTND  Extremities - No peripheral edema, No calf tenderness   Neurologic Exam:                    Cognitive -             Orientation: Awake, Alert, AAO to self, place, date, year, situation            Attention:  Days of week, recall 3 objects without cuing            Memory: Recent/ remote memory intact            Thought: process, content appropriate     Speech - Fluent, Comprehensible, No dysarthria, No aphasia      Cranial Nerves - No facial asymmetry, Tongue midline, EOMI, Shoulder shrug intact     Motor -                      LEFT    UE - ShAB 5/5, EF 5/5, EE 5/5, WE 5/5,  WNL                    RIGHT UE - ShAB 5/5, EF 5/5, EE 5/5, WE 5/5,  WNL                    LEFT    LE - HF 5/5, KE 5/5, DF 5/5, PF 5/5                    RIGHT LE - HF 5/5, KE 5/5, DF 5/5, PF 5/5        Sensory - Intact to LT bilateral     Reflexes - DTR _ / 4 , neg See's b/l, neg Babinski's b/l     Coordination - FTN / HTS intact     OculoVestibular -  No nystagmus  Psychiatric - Mood stable, Affect WNL  Skin on admission:    LABS:                        8.7    5.05  )-----------( 139      ( 31 Oct 2024 05:30 )             24.4     31 Oct 2024 15:02    132    |  105    |  66     ----------------------------<  133    4.1     |  20     |  5.67     Ca    8.5        31 Oct 2024 15:02  Phos  3.8       31 Oct 2024 05:30  Mg     1.8       31 Oct 2024 05:30    TPro  7.1    /  Alb  3.5    /  TBili  0.2    /  DBili  x      /  AST  9      /  ALT  7      /  AlkPhos  126    31 Oct 2024 05:30    PT/INR - ( 31 Oct 2024 05:30 )   PT: 11.3 sec;   INR: 0.98          PTT - ( 31 Oct 2024 05:30 )  PTT:26.4 sec  CAPILLARY BLOOD GLUCOSE      POCT Blood Glucose.: 359 mg/dL (31 Oct 2024 16:35)  POCT Blood Glucose.: 126 mg/dL (31 Oct 2024 11:12)  POCT Blood Glucose.: 203 mg/dL (31 Oct 2024 06:25)  POCT Blood Glucose.: 318 mg/dL (30 Oct 2024 22:30)    BLOOD CULTURE    RADIOLOGY & ADDITIONAL TESTS:    Imaging Personally Reviewed:  [ ] YES     Consultant(s) Notes Reviewed:      Care Discussed with Consultants/Other Providers: ***INCOMPLETE NOTE***    Patient is a 64y old  Male who presents with a chief complaint of weakness (30 Oct 2024 14:06)    ---------------------------TRANSFER [facility] TO [facility]---------------------------    HOSPITAL COURSE:  64 M pmhx HTN, hypothyroidism, HLD, T2DM, CKD 3 (baseline Cr 2.3-2.5), colostomy, toxic megacolon, EtOH use disorder (no cirrhosis, confirmed by RUQ US on this admission), presented for weakness and found to have NAGMA 2/2 MEG on CKD likely 2/2 poor PO intake, admitted [facility]ate drip. Per pt, he stopped eating and drinking to help control his diabetes and because he did not want to change his colostomy bag as often for financial reasons. When pt arrived, sodium was was 122 with NAGMA pH 7.19. Pt started on sodium bicarb ggt and 2L NS IV fluids. Pt sodium improved to 131 now and NAGMA and AMS/weakness resolved. Pt also found to have worsening kidney function, GFR<10 now. MEG on CKD likely pre-renal with component of intrinsic kidney failure. Nephrology recommending dialysis for kidney failure.     SUBJECTIVE: Patient seen and examined this evening.    All other review of systems negative except otherwise noted in HPI above.    MEDICATIONS  (STANDING):  atorvastatin 10 milliGRAM(s) Oral at bedtime  dextrose 5% 1000 milliLiter(s) (100 mL/Hr) IV Continuous <Continuous>  dextrose 5%. 1000 milliLiter(s) (100 mL/Hr) IV Continuous <Continuous>  dextrose 5%. 1000 milliLiter(s) (50 mL/Hr) IV Continuous <Continuous>  dextrose 50% Injectable 25 Gram(s) IV Push once  dextrose 50% Injectable 12.5 Gram(s) IV Push once  dextrose 50% Injectable 25 Gram(s) IV Push once  epoetin martine-epbx (RETACRIT) Injectable 7000 Unit(s) IV Push once  gabapentin 100 milliGRAM(s) Oral daily  glucagon  Injectable 1 milliGRAM(s) IntraMuscular once  heparin   Injectable 5000 Unit(s) SubCutaneous every 8 hours  influenza   Vaccine 0.5 milliLiter(s) IntraMuscular once  insulin glargine Injectable (LANTUS) 12 Unit(s) SubCutaneous at bedtime  insulin lispro (ADMELOG) corrective regimen sliding scale   SubCutaneous Before meals and at bedtime  insulin lispro Injectable (ADMELOG) 5 Unit(s) SubCutaneous three times a day before meals  levothyroxine 50 MICROGram(s) Oral daily  NIFEdipine XL 60 milliGRAM(s) Oral every 24 hours  sodium bicarbonate 650 milliGRAM(s) Oral every 8 hours  tamsulosin 0.4 milliGRAM(s) Oral at bedtime    MEDICATIONS  (PRN):  acetaminophen     Tablet .. 650 milliGRAM(s) Oral every 6 hours PRN Mild Pain (1 - 3)  dextrose Oral Gel 15 Gram(s) Oral once PRN Blood Glucose LESS THAN 70 milliGRAM(s)/deciliter    Allergies    No Known Allergies    Intolerances      Vital Signs Last 24 Hrs  T(C): 36.5 (31 Oct 2024 17:45), Max: 37 (31 Oct 2024 00:23)  T(F): 97.7 (31 Oct 2024 17:45), Max: 98.6 (31 Oct 2024 00:23)  HR: 76 (31 Oct 2024 15:33) (44 - 78)  BP: 187/82 (31 Oct 2024 15:33) (138/63 - 187/82)  BP(mean): 118 (31 Oct 2024 15:33) (90 - 118)  RR: 18 (31 Oct 2024 15:33) (16 - 18)  SpO2: 97% (31 Oct 2024 15:33) (96% - 99%)    Parameters below as of 31 Oct 2024 15:33  Patient On (Oxygen Delivery Method): room air      PHYSICAL EXAM:  Constitutional - NAD, Comfortable  HEENT - NCAT, EOMI  Neck - No limited ROM  Chest - Breathing comfortably on room air, CTAB   Cardio - Warm and well perfused, RRR, no murmur  Abdomen -  Soft, NTND  Extremities - No peripheral edema, No calf tenderness   Neurologic Exam:                    Cognitive -             Orientation: Awake, Alert, AAO to self, place, date, year, situation            Attention:  Days of week, recall 3 objects without cuing            Memory: Recent/ remote memory intact            Thought: process, content appropriate     Speech - Fluent, Comprehensible, No dysarthria, No aphasia      Cranial Nerves - No facial asymmetry, Tongue midline, EOMI, Shoulder shrug intact     Motor -                      LEFT    UE - ShAB 5/5, EF 5/5, EE 5/5, WE 5/5,  WNL                    RIGHT UE - ShAB 5/5, EF 5/5, EE 5/5, WE 5/5,  WNL                    LEFT    LE - HF 5/5, KE 5/5, DF 5/5, PF 5/5                    RIGHT LE - HF 5/5, KE 5/5, DF 5/5, PF 5/5        Sensory - Intact to LT bilateral     Reflexes - DTR _ / 4 , neg See's b/l, neg Babinski's b/l     Coordination - FTN / HTS intact     OculoVestibular -  No nystagmus  Psychiatric - Mood stable, Affect WNL  Skin on admission:    LABS:                        8.7    5.05  )-----------( 139      ( 31 Oct 2024 05:30 )             24.4     31 Oct 2024 15:02    132    |  105    |  66     ----------------------------<  133    4.1     |  20     |  5.67     Ca    8.5        31 Oct 2024 15:02  Phos  3.8       31 Oct 2024 05:30  Mg     1.8       31 Oct 2024 05:30    TPro  7.1    /  Alb  3.5    /  TBili  0.2    /  DBili  x      /  AST  9      /  ALT  7      /  AlkPhos  126    31 Oct 2024 05:30    PT/INR - ( 31 Oct 2024 05:30 )   PT: 11.3 sec;   INR: 0.98          PTT - ( 31 Oct 2024 05:30 )  PTT:26.4 sec  CAPILLARY BLOOD GLUCOSE      POCT Blood Glucose.: 359 mg/dL (31 Oct 2024 16:35)  POCT Blood Glucose.: 126 mg/dL (31 Oct 2024 11:12)  POCT Blood Glucose.: 203 mg/dL (31 Oct 2024 06:25)  POCT Blood Glucose.: 318 mg/dL (30 Oct 2024 22:30)    BLOOD CULTURE    RADIOLOGY & ADDITIONAL TESTS:    Imaging Personally Reviewed:  [ ] YES     Consultant(s) Notes Reviewed:      Care Discussed with Consultants/Other Providers: ***INCOMPLETE NOTE***    Patient is a 64y old  Male who presents with a chief complaint of weakness (30 Oct 2024 14:06)    ---------------------------TRANSFER [facility] TO [facility]---------------------------    HOSPITAL COURSE:  64 M pmhx HTN, hypothyroidism, HLD, T2DM, CKD 3 (baseline Cr 2.3-2.5), colostomy, toxic megacolon, EtOH use disorder (no cirrhosis, confirmed by RUQ US on this admission), presented for weakness and found to have NAGMA 2/2 MEG on CKD likely 2/2 poor PO intake, admitted [facility] for bicarbonate drip. Per pt, he stopped eating and drinking to help control his diabetes and because he did not want to change his colostomy bag as often for financial reasons. When pt arrived, sodium was was 122 with NAGMA pH 7.19. Pt started on sodium bicarb ggt and 2L NS IV fluids. Pt sodium improved to 131 now and NAGMA and AMS/weakness resolved. Pt also found to have worsening kidney function, GFR<10 now. MEG on CKD likely pre-renal with component of intrinsic kidney failure. Nephrology recommending dialysis for kidney failure. Pending IR placement of HD catheter 11/1.    SUBJECTIVE: Patient seen and examined this evening. He reports feeling much better. Patient denies fevers/chills, chest pain, shortness of breath, abdominal pain, nausea/vomiting, urinary discomfort/frequency, and diarrhea.     All other review of systems negative except otherwise noted in HPI above.    MEDICATIONS  (STANDING):  atorvastatin 10 milliGRAM(s) Oral at bedtime  dextrose 5% 1000 milliLiter(s) (100 mL/Hr) IV Continuous <Continuous>  dextrose 5%. 1000 milliLiter(s) (100 mL/Hr) IV Continuous <Continuous>  dextrose 5%. 1000 milliLiter(s) (50 mL/Hr) IV Continuous <Continuous>  dextrose 50% Injectable 25 Gram(s) IV Push once  dextrose 50% Injectable 12.5 Gram(s) IV Push once  dextrose 50% Injectable 25 Gram(s) IV Push once  epoetin martine-epbx (RETACRIT) Injectable 7000 Unit(s) IV Push once  gabapentin 100 milliGRAM(s) Oral daily  glucagon  Injectable 1 milliGRAM(s) IntraMuscular once  heparin   Injectable 5000 Unit(s) SubCutaneous every 8 hours  influenza   Vaccine 0.5 milliLiter(s) IntraMuscular once  insulin glargine Injectable (LANTUS) 12 Unit(s) SubCutaneous at bedtime  insulin lispro (ADMELOG) corrective regimen sliding scale   SubCutaneous Before meals and at bedtime  insulin lispro Injectable (ADMELOG) 5 Unit(s) SubCutaneous three times a day before meals  levothyroxine 50 MICROGram(s) Oral daily  NIFEdipine XL 60 milliGRAM(s) Oral every 24 hours  sodium bicarbonate 650 milliGRAM(s) Oral every 8 hours  tamsulosin 0.4 milliGRAM(s) Oral at bedtime    MEDICATIONS  (PRN):  acetaminophen     Tablet .. 650 milliGRAM(s) Oral every 6 hours PRN Mild Pain (1 - 3)  dextrose Oral Gel 15 Gram(s) Oral once PRN Blood Glucose LESS THAN 70 milliGRAM(s)/deciliter    Allergies    No Known Allergies    Intolerances      Vital Signs Last 24 Hrs  T(C): 36.5 (31 Oct 2024 17:45), Max: 37 (31 Oct 2024 00:23)  T(F): 97.7 (31 Oct 2024 17:45), Max: 98.6 (31 Oct 2024 00:23)  HR: 76 (31 Oct 2024 15:33) (44 - 78)  BP: 187/82 (31 Oct 2024 15:33) (138/63 - 187/82)  BP(mean): 118 (31 Oct 2024 15:33) (90 - 118)  RR: 18 (31 Oct 2024 15:33) (16 - 18)  SpO2: 97% (31 Oct 2024 15:33) (96% - 99%)    Parameters below as of 31 Oct 2024 15:33  Patient On (Oxygen Delivery Method): room air      PHYSICAL EXAM:  Constitutional - NAD, Comfortable  HEENT - NCAT, EOMI  Neck - No limited ROM  Chest - Breathing comfortably on room air, CTAB   Cardio - Warm and well perfused, RRR  Abdomen -  Soft, (+)distended. Midline surgical scar with skin retraction and surrounding distention. (+)Diffuse mild TTP. RLQ colostomy bag in place with no surrounding erythema or drainage.   Extremities - No peripheral edema, No calf tenderness   Neurologic - Awake and alert. Speaking fluently. Oriented to full name, location as "[facility]", full date, and situation  Psychiatric - Mood stable, Affect WNL    LABS:                        8.7    5.05  )-----------( 139      ( 31 Oct 2024 05:30 )             24.4     31 Oct 2024 15:02    132    |  105    |  66     ----------------------------<  133    4.1     |  20     |  5.67     Ca    8.5        31 Oct 2024 15:02  Phos  3.8       31 Oct 2024 05:30  Mg     1.8       31 Oct 2024 05:30    TPro  7.1    /  Alb  3.5    /  TBili  0.2    /  DBili  x      /  AST  9      /  ALT  7      /  AlkPhos  126    31 Oct 2024 05:30    PT/INR - ( 31 Oct 2024 05:30 )   PT: 11.3 sec;   INR: 0.98          PTT - ( 31 Oct 2024 05:30 )  PTT:26.4 sec  CAPILLARY BLOOD GLUCOSE      POCT Blood Glucose.: 359 mg/dL (31 Oct 2024 16:35)  POCT Blood Glucose.: 126 mg/dL (31 Oct 2024 11:12)  POCT Blood Glucose.: 203 mg/dL (31 Oct 2024 06:25)  POCT Blood Glucose.: 318 mg/dL (30 Oct 2024 22:30)    BLOOD CULTURE    RADIOLOGY & ADDITIONAL TESTS: Reviewed Patient is a 64y old  Male who presents with a chief complaint of weakness (30 Oct 2024 14:06)    ---------------------------TRANSFER FROM [facility] TO [facility]---------------------------    HOSPITAL COURSE:  64 M pmhx HTN, hypothyroidism, HLD, T2DM, CKD 3 (baseline Cr 2.3-2.5), colostomy, toxic megacolon, EtOH use disorder (no cirrhosis, confirmed by RUQ US on this admission), presented for weakness and found to have NAGMA 2/2 MEG on CKD likely 2/2 poor PO intake, admitted to [facility] for bicarbonate drip. Per pt, he stopped eating and drinking to help control his diabetes and because he did not want to change his colostomy bag as often for financial reasons. When pt arrived, sodium was was 122 with NAGMA pH 7.19. Pt started on sodium bicarb ggt and 2L NS IV fluids. Pt sodium improved to 131 now and NAGMA and AMS/weakness resolved. Pt also found to have worsening kidney function, GFR<10 now. MEG on CKD likely pre-renal with component of intrinsic kidney failure. Nephrology recommending dialysis for kidney failure. Pending IR placement of HD catheter 11/1.    SUBJECTIVE: Patient seen and examined this evening. He reports feeling much better. Patient denies fevers/chills, chest pain, shortness of breath, abdominal pain, nausea/vomiting, urinary discomfort/frequency, and diarrhea.     All other review of systems negative except otherwise noted in HPI above.    MEDICATIONS  (STANDING):  atorvastatin 10 milliGRAM(s) Oral at bedtime  dextrose 5% 1000 milliLiter(s) (100 mL/Hr) IV Continuous <Continuous>  dextrose 5%. 1000 milliLiter(s) (100 mL/Hr) IV Continuous <Continuous>  dextrose 5%. 1000 milliLiter(s) (50 mL/Hr) IV Continuous <Continuous>  dextrose 50% Injectable 25 Gram(s) IV Push once  dextrose 50% Injectable 12.5 Gram(s) IV Push once  dextrose 50% Injectable 25 Gram(s) IV Push once  epoetin martine-epbx (RETACRIT) Injectable 7000 Unit(s) IV Push once  gabapentin 100 milliGRAM(s) Oral daily  glucagon  Injectable 1 milliGRAM(s) IntraMuscular once  heparin   Injectable 5000 Unit(s) SubCutaneous every 8 hours  influenza   Vaccine 0.5 milliLiter(s) IntraMuscular once  insulin glargine Injectable (LANTUS) 12 Unit(s) SubCutaneous at bedtime  insulin lispro (ADMELOG) corrective regimen sliding scale   SubCutaneous Before meals and at bedtime  insulin lispro Injectable (ADMELOG) 5 Unit(s) SubCutaneous three times a day before meals  levothyroxine 50 MICROGram(s) Oral daily  NIFEdipine XL 60 milliGRAM(s) Oral every 24 hours  sodium bicarbonate 650 milliGRAM(s) Oral every 8 hours  tamsulosin 0.4 milliGRAM(s) Oral at bedtime    MEDICATIONS  (PRN):  acetaminophen     Tablet .. 650 milliGRAM(s) Oral every 6 hours PRN Mild Pain (1 - 3)  dextrose Oral Gel 15 Gram(s) Oral once PRN Blood Glucose LESS THAN 70 milliGRAM(s)/deciliter    Allergies    No Known Allergies    Intolerances      Vital Signs Last 24 Hrs  T(C): 36.5 (31 Oct 2024 17:45), Max: 37 (31 Oct 2024 00:23)  T(F): 97.7 (31 Oct 2024 17:45), Max: 98.6 (31 Oct 2024 00:23)  HR: 76 (31 Oct 2024 15:33) (44 - 78)  BP: 187/82 (31 Oct 2024 15:33) (138/63 - 187/82)  BP(mean): 118 (31 Oct 2024 15:33) (90 - 118)  RR: 18 (31 Oct 2024 15:33) (16 - 18)  SpO2: 97% (31 Oct 2024 15:33) (96% - 99%)    Parameters below as of 31 Oct 2024 15:33  Patient On (Oxygen Delivery Method): room air      PHYSICAL EXAM:  Constitutional - NAD, Comfortable  HEENT - NCAT, EOMI  Neck - No limited ROM  Chest - Breathing comfortably on room air, CTAB   Cardio - Warm and well perfused, RRR  Abdomen -  Soft, (+)distended. Midline surgical scar with skin retraction and surrounding distention. (+)Diffuse mild TTP. RLQ colostomy bag in place with no surrounding erythema or drainage.   Extremities - No peripheral edema, No calf tenderness   Neurologic - Awake and alert. Speaking fluently. Oriented to full name, location as "[facility]", full date, and situation  Psychiatric - Mood stable, Affect WNL    LABS:                        8.7    5.05  )-----------( 139      ( 31 Oct 2024 05:30 )             24.4     31 Oct 2024 15:02    132    |  105    |  66     ----------------------------<  133    4.1     |  20     |  5.67     Ca    8.5        31 Oct 2024 15:02  Phos  3.8       31 Oct 2024 05:30  Mg     1.8       31 Oct 2024 05:30    TPro  7.1    /  Alb  3.5    /  TBili  0.2    /  DBili  x      /  AST  9      /  ALT  7      /  AlkPhos  126    31 Oct 2024 05:30    PT/INR - ( 31 Oct 2024 05:30 )   PT: 11.3 sec;   INR: 0.98          PTT - ( 31 Oct 2024 05:30 )  PTT:26.4 sec  CAPILLARY BLOOD GLUCOSE      POCT Blood Glucose.: 359 mg/dL (31 Oct 2024 16:35)  POCT Blood Glucose.: 126 mg/dL (31 Oct 2024 11:12)  POCT Blood Glucose.: 203 mg/dL (31 Oct 2024 06:25)  POCT Blood Glucose.: 318 mg/dL (30 Oct 2024 22:30)    BLOOD CULTURE    RADIOLOGY & ADDITIONAL TESTS: Reviewed

## 2024-11-01 LAB
ALBUMIN SERPL ELPH-MCNC: 3.8 G/DL — SIGNIFICANT CHANGE UP (ref 3.3–5)
ALP SERPL-CCNC: 168 U/L — HIGH (ref 40–120)
ALT FLD-CCNC: 13 U/L — SIGNIFICANT CHANGE UP (ref 10–45)
ANION GAP SERPL CALC-SCNC: 13 MMOL/L — SIGNIFICANT CHANGE UP (ref 5–17)
APTT BLD: 27.2 SEC — SIGNIFICANT CHANGE UP (ref 24.5–35.6)
AST SERPL-CCNC: 15 U/L — SIGNIFICANT CHANGE UP (ref 10–40)
BASOPHILS # BLD AUTO: 0.02 K/UL — SIGNIFICANT CHANGE UP (ref 0–0.2)
BASOPHILS NFR BLD AUTO: 0.4 % — SIGNIFICANT CHANGE UP (ref 0–2)
BILIRUB SERPL-MCNC: 0.2 MG/DL — SIGNIFICANT CHANGE UP (ref 0.2–1.2)
BUN SERPL-MCNC: 65 MG/DL — HIGH (ref 7–23)
CALCIUM SERPL-MCNC: 8.4 MG/DL — SIGNIFICANT CHANGE UP (ref 8.4–10.5)
CHLORIDE SERPL-SCNC: 100 MMOL/L — SIGNIFICANT CHANGE UP (ref 96–108)
CO2 SERPL-SCNC: 19 MMOL/L — LOW (ref 22–31)
CREAT SERPL-MCNC: 5.57 MG/DL — HIGH (ref 0.5–1.3)
EGFR: 11 ML/MIN/1.73M2 — LOW
EOSINOPHIL # BLD AUTO: 0.12 K/UL — SIGNIFICANT CHANGE UP (ref 0–0.5)
EOSINOPHIL NFR BLD AUTO: 2.5 % — SIGNIFICANT CHANGE UP (ref 0–6)
GLUCOSE BLDC GLUCOMTR-MCNC: 214 MG/DL — HIGH (ref 70–99)
GLUCOSE BLDC GLUCOMTR-MCNC: 269 MG/DL — HIGH (ref 70–99)
GLUCOSE BLDC GLUCOMTR-MCNC: 298 MG/DL — HIGH (ref 70–99)
GLUCOSE BLDC GLUCOMTR-MCNC: 394 MG/DL — HIGH (ref 70–99)
GLUCOSE SERPL-MCNC: 434 MG/DL — HIGH (ref 70–99)
HAV IGM SER-ACNC: SIGNIFICANT CHANGE UP
HBV CORE AB SER-ACNC: SIGNIFICANT CHANGE UP
HBV CORE IGM SER-ACNC: SIGNIFICANT CHANGE UP
HBV SURFACE AB SER-ACNC: REACTIVE — SIGNIFICANT CHANGE UP
HBV SURFACE AG SER-ACNC: SIGNIFICANT CHANGE UP
HCT VFR BLD CALC: 27.4 % — LOW (ref 39–50)
HCV AB S/CO SERPL IA: 0.08 S/CO — SIGNIFICANT CHANGE UP
HCV AB SERPL-IMP: SIGNIFICANT CHANGE UP
HGB BLD-MCNC: 9.5 G/DL — LOW (ref 13–17)
IMM GRANULOCYTES NFR BLD AUTO: 1.3 % — HIGH (ref 0–0.9)
INR BLD: 0.95 — SIGNIFICANT CHANGE UP (ref 0.85–1.16)
LYMPHOCYTES # BLD AUTO: 0.94 K/UL — LOW (ref 1–3.3)
LYMPHOCYTES # BLD AUTO: 19.7 % — SIGNIFICANT CHANGE UP (ref 13–44)
MAGNESIUM SERPL-MCNC: 1.8 MG/DL — SIGNIFICANT CHANGE UP (ref 1.6–2.6)
MCHC RBC-ENTMCNC: 31.4 PG — SIGNIFICANT CHANGE UP (ref 27–34)
MCHC RBC-ENTMCNC: 34.7 G/DL — SIGNIFICANT CHANGE UP (ref 32–36)
MCV RBC AUTO: 90.4 FL — SIGNIFICANT CHANGE UP (ref 80–100)
MONOCYTES # BLD AUTO: 0.6 K/UL — SIGNIFICANT CHANGE UP (ref 0–0.9)
MONOCYTES NFR BLD AUTO: 12.6 % — SIGNIFICANT CHANGE UP (ref 2–14)
NEUTROPHILS # BLD AUTO: 3.02 K/UL — SIGNIFICANT CHANGE UP (ref 1.8–7.4)
NEUTROPHILS NFR BLD AUTO: 63.5 % — SIGNIFICANT CHANGE UP (ref 43–77)
NRBC # BLD: 0 /100 WBCS — SIGNIFICANT CHANGE UP (ref 0–0)
PHOSPHATE SERPL-MCNC: 2.3 MG/DL — LOW (ref 2.5–4.5)
PLATELET # BLD AUTO: 157 K/UL — SIGNIFICANT CHANGE UP (ref 150–400)
POTASSIUM SERPL-MCNC: 4.4 MMOL/L — SIGNIFICANT CHANGE UP (ref 3.5–5.3)
POTASSIUM SERPL-SCNC: 4.4 MMOL/L — SIGNIFICANT CHANGE UP (ref 3.5–5.3)
PROT SERPL-MCNC: 7.6 G/DL — SIGNIFICANT CHANGE UP (ref 6–8.3)
PROTHROM AB SERPL-ACNC: 11.1 SEC — SIGNIFICANT CHANGE UP (ref 9.9–13.4)
RBC # BLD: 3.03 M/UL — LOW (ref 4.2–5.8)
RBC # FLD: 12.9 % — SIGNIFICANT CHANGE UP (ref 10.3–14.5)
SODIUM SERPL-SCNC: 132 MMOL/L — LOW (ref 135–145)
WBC # BLD: 4.76 K/UL — SIGNIFICANT CHANGE UP (ref 3.8–10.5)
WBC # FLD AUTO: 4.76 K/UL — SIGNIFICANT CHANGE UP (ref 3.8–10.5)

## 2024-11-01 PROCEDURE — 90935 HEMODIALYSIS ONE EVALUATION: CPT

## 2024-11-01 PROCEDURE — 36558 INSERT TUNNELED CV CATH: CPT | Mod: RT

## 2024-11-01 PROCEDURE — 76937 US GUIDE VASCULAR ACCESS: CPT | Mod: 26

## 2024-11-01 PROCEDURE — 77001 FLUOROGUIDE FOR VEIN DEVICE: CPT | Mod: 26

## 2024-11-01 PROCEDURE — 99233 SBSQ HOSP IP/OBS HIGH 50: CPT | Mod: GC

## 2024-11-01 PROCEDURE — 99152 MOD SED SAME PHYS/QHP 5/>YRS: CPT

## 2024-11-01 RX ORDER — INSULIN GLARGINE,HUM.REC.ANLOG 100/ML
25 VIAL (ML) SUBCUTANEOUS AT BEDTIME
Refills: 0 | Status: DISCONTINUED | OUTPATIENT
Start: 2024-11-01 | End: 2024-11-05

## 2024-11-01 RX ORDER — MELATONIN 5 MG
3 TABLET ORAL ONCE
Refills: 0 | Status: COMPLETED | OUTPATIENT
Start: 2024-11-01 | End: 2024-11-01

## 2024-11-01 RX ORDER — TUBERCULIN,PURIF.PROT.DERIV. 5 T/0.1 ML
5 VIAL (ML) INTRADERMAL ONCE
Refills: 0 | Status: DISCONTINUED | OUTPATIENT
Start: 2024-11-01 | End: 2024-11-02

## 2024-11-01 RX ORDER — GABAPENTIN 300 MG/1
100 CAPSULE ORAL EVERY 12 HOURS
Refills: 0 | Status: DISCONTINUED | OUTPATIENT
Start: 2024-11-01 | End: 2024-11-03

## 2024-11-01 RX ADMIN — Medication 25 UNIT(S): at 22:29

## 2024-11-01 RX ADMIN — GABAPENTIN 100 MILLIGRAM(S): 300 CAPSULE ORAL at 22:28

## 2024-11-01 RX ADMIN — Medication 6: at 22:30

## 2024-11-01 RX ADMIN — Medication 10 MILLIGRAM(S): at 21:25

## 2024-11-01 RX ADMIN — GABAPENTIN 100 MILLIGRAM(S): 300 CAPSULE ORAL at 10:38

## 2024-11-01 RX ADMIN — Medication 3 MILLIGRAM(S): at 23:16

## 2024-11-01 RX ADMIN — Medication 650 MILLIGRAM(S): at 22:27

## 2024-11-01 RX ADMIN — Medication 650 MILLIGRAM(S): at 06:02

## 2024-11-01 RX ADMIN — Medication 650 MILLIGRAM(S): at 15:21

## 2024-11-01 RX ADMIN — Medication 650 MILLIGRAM(S): at 22:28

## 2024-11-01 RX ADMIN — ERYTHROPOIETIN 7000 UNIT(S): 10000 INJECTION, SOLUTION INTRAVENOUS; SUBCUTANEOUS at 18:57

## 2024-11-01 RX ADMIN — Medication 650 MILLIGRAM(S): at 23:27

## 2024-11-01 RX ADMIN — Medication 50 MICROGRAM(S): at 06:01

## 2024-11-01 RX ADMIN — Medication 4: at 13:21

## 2024-11-01 RX ADMIN — Medication 10: at 09:30

## 2024-11-01 RX ADMIN — Medication 0.4 MILLIGRAM(S): at 21:25

## 2024-11-01 NOTE — PROGRESS NOTE ADULT - PROBLEM SELECTOR PLAN 4
Patient with T2DM, on basal bolus insulin regimen 20/7 at home. + at time of admission, A1c 9.2. Pt last picked up insulin from pharmacy in jan 2024. At that time, pt dosing was 24 units lantus in the morning. Reports that he is not currently taking Insulin at home. Has chronic peripheral neuropathy and takes Gabapentin 300mg BID at home.    - Lantus 12U qhs  - Lispro 5U TID w/ meals  - mISS  - carb consistent diet  - Renally dosed Gabapentin 100mg daily Patient with T2DM, on basal bolus insulin regimen 20/7 at home. + at time of admission, A1c 9.2. Pt last picked up insulin from pharmacy in jan 2024. At that time, pt dosing was 24 units lantus in the morning. Reports that he is not currently taking Insulin at home. Has chronic peripheral neuropathy and takes Gabapentin 300mg BID at home.    - Increase Lantus 25u qhs  - Lispro 5U TID w/ meals --> pt currently NPO for TDC placement, will increase pre-meal requirements once diet resumes  - mISS  - carb consistent diet  - Increase gabapentin 100mg to BID

## 2024-11-01 NOTE — PROGRESS NOTE ADULT - SUBJECTIVE AND OBJECTIVE BOX
OVERNIGHT EVENTS:    SUBJECTIVE / INTERVAL HPI: Patient seen and examined at bedside.       PHYSICAL EXAM:    General: NAD  HEENT: NC/AT; PERRL, anicteric sclera; MMM  Neck: supple  Cardiovascular: +S1/S2, RRR  Respiratory: CTA B/L; no W/R/R  Gastrointestinal: soft, NT/ND; +BSx4  Extremities: WWP; no edema, clubbing or cyanosis  Vascular: 2+ radial, DP/PT pulses B/L  Neurological: AAOx3; no focal deficits  Psychiatric: pleasant mood and affect  Dermatologic: no appreciable wounds or damage to the skin    VITAL SIGNS:  Vital Signs Last 24 Hrs  T(C): 37.3 (01 Nov 2024 06:18), Max: 37.3 (01 Nov 2024 06:18)  T(F): 99.1 (01 Nov 2024 06:18), Max: 99.1 (01 Nov 2024 06:18)  HR: 80 (01 Nov 2024 06:18) (50 - 95)  BP: 149/69 (01 Nov 2024 06:18) (147/67 - 187/82)  BP(mean): 104 (31 Oct 2024 20:23) (97 - 118)  RR: 17 (01 Nov 2024 06:18) (16 - 18)  SpO2: 97% (01 Nov 2024 06:18) (97% - 99%)    Parameters below as of 01 Nov 2024 06:18  Patient On (Oxygen Delivery Method): room air          MEDICATIONS:  MEDICATIONS  (STANDING):  atorvastatin 10 milliGRAM(s) Oral at bedtime  dextrose 5%. 1000 milliLiter(s) (100 mL/Hr) IV Continuous <Continuous>  dextrose 5%. 1000 milliLiter(s) (50 mL/Hr) IV Continuous <Continuous>  dextrose 50% Injectable 25 Gram(s) IV Push once  dextrose 50% Injectable 12.5 Gram(s) IV Push once  dextrose 50% Injectable 25 Gram(s) IV Push once  epoetin martine-epbx (RETACRIT) Injectable 7000 Unit(s) IV Push once  gabapentin 100 milliGRAM(s) Oral daily  glucagon  Injectable 1 milliGRAM(s) IntraMuscular once  influenza   Vaccine 0.5 milliLiter(s) IntraMuscular once  insulin glargine Injectable (LANTUS) 12 Unit(s) SubCutaneous at bedtime  insulin lispro (ADMELOG) corrective regimen sliding scale   SubCutaneous Before meals and at bedtime  insulin lispro Injectable (ADMELOG) 5 Unit(s) SubCutaneous three times a day before meals  levothyroxine 50 MICROGram(s) Oral daily  NIFEdipine XL 60 milliGRAM(s) Oral every 24 hours  sodium bicarbonate 650 milliGRAM(s) Oral every 8 hours  tamsulosin 0.4 milliGRAM(s) Oral at bedtime    MEDICATIONS  (PRN):  acetaminophen     Tablet .. 650 milliGRAM(s) Oral every 6 hours PRN Mild Pain (1 - 3)  dextrose Oral Gel 15 Gram(s) Oral once PRN Blood Glucose LESS THAN 70 milliGRAM(s)/deciliter      ALLERGIES:  Allergies    No Known Allergies    Intolerances        LABS:                        8.7    5.05  )-----------( 139      ( 31 Oct 2024 05:30 )             24.4     10-31    132[L]  |  105  |  66[H]  ----------------------------<  133[H]  4.1   |  20[L]  |  5.67[H]    Ca    8.5      31 Oct 2024 15:02  Phos  3.8     10-31  Mg     1.8     10-31    TPro  7.1  /  Alb  3.5  /  TBili  0.2  /  DBili  x   /  AST  9[L]  /  ALT  7[L]  /  AlkPhos  126[H]  10-31    PT/INR - ( 31 Oct 2024 05:30 )   PT: 11.3 sec;   INR: 0.98          PTT - ( 31 Oct 2024 05:30 )  PTT:26.4 sec  Urinalysis Basic - ( 31 Oct 2024 15:02 )    Color: x / Appearance: x / SG: x / pH: x  Gluc: 133 mg/dL / Ketone: x  / Bili: x / Urobili: x   Blood: x / Protein: x / Nitrite: x   Leuk Esterase: x / RBC: x / WBC x   Sq Epi: x / Non Sq Epi: x / Bacteria: x      CAPILLARY BLOOD GLUCOSE      POCT Blood Glucose.: 434 mg/dL (31 Oct 2024 22:01)      RADIOLOGY & ADDITIONAL TESTS: Reviewed. OVERNIGHT EVENTS: Glucose 434. Given 12u iSS and lantus 12u at bedtime.    SUBJECTIVE / INTERVAL HPI: Patient seen and examined at bedside.       PHYSICAL EXAM:    General: NAD  HEENT: NC/AT; PERRL, anicteric sclera; MMM  Neck: supple  Cardiovascular: +S1/S2, RRR  Respiratory: CTA B/L; no W/R/R  Gastrointestinal: soft, NT/ND; +BSx4  Extremities: WWP; no edema, clubbing or cyanosis  Vascular: 2+ radial, DP/PT pulses B/L  Neurological: AAOx3; no focal deficits  Psychiatric: pleasant mood and affect  Dermatologic: no appreciable wounds or damage to the skin    VITAL SIGNS:  Vital Signs Last 24 Hrs  T(C): 37.3 (01 Nov 2024 06:18), Max: 37.3 (01 Nov 2024 06:18)  T(F): 99.1 (01 Nov 2024 06:18), Max: 99.1 (01 Nov 2024 06:18)  HR: 80 (01 Nov 2024 06:18) (50 - 95)  BP: 149/69 (01 Nov 2024 06:18) (147/67 - 187/82)  BP(mean): 104 (31 Oct 2024 20:23) (97 - 118)  RR: 17 (01 Nov 2024 06:18) (16 - 18)  SpO2: 97% (01 Nov 2024 06:18) (97% - 99%)    Parameters below as of 01 Nov 2024 06:18  Patient On (Oxygen Delivery Method): room air          MEDICATIONS:  MEDICATIONS  (STANDING):  atorvastatin 10 milliGRAM(s) Oral at bedtime  dextrose 5%. 1000 milliLiter(s) (100 mL/Hr) IV Continuous <Continuous>  dextrose 5%. 1000 milliLiter(s) (50 mL/Hr) IV Continuous <Continuous>  dextrose 50% Injectable 25 Gram(s) IV Push once  dextrose 50% Injectable 12.5 Gram(s) IV Push once  dextrose 50% Injectable 25 Gram(s) IV Push once  epoetin martine-epbx (RETACRIT) Injectable 7000 Unit(s) IV Push once  gabapentin 100 milliGRAM(s) Oral daily  glucagon  Injectable 1 milliGRAM(s) IntraMuscular once  influenza   Vaccine 0.5 milliLiter(s) IntraMuscular once  insulin glargine Injectable (LANTUS) 12 Unit(s) SubCutaneous at bedtime  insulin lispro (ADMELOG) corrective regimen sliding scale   SubCutaneous Before meals and at bedtime  insulin lispro Injectable (ADMELOG) 5 Unit(s) SubCutaneous three times a day before meals  levothyroxine 50 MICROGram(s) Oral daily  NIFEdipine XL 60 milliGRAM(s) Oral every 24 hours  sodium bicarbonate 650 milliGRAM(s) Oral every 8 hours  tamsulosin 0.4 milliGRAM(s) Oral at bedtime    MEDICATIONS  (PRN):  acetaminophen     Tablet .. 650 milliGRAM(s) Oral every 6 hours PRN Mild Pain (1 - 3)  dextrose Oral Gel 15 Gram(s) Oral once PRN Blood Glucose LESS THAN 70 milliGRAM(s)/deciliter      ALLERGIES:  Allergies    No Known Allergies    Intolerances        LABS:                        8.7    5.05  )-----------( 139      ( 31 Oct 2024 05:30 )             24.4     10-31    132[L]  |  105  |  66[H]  ----------------------------<  133[H]  4.1   |  20[L]  |  5.67[H]    Ca    8.5      31 Oct 2024 15:02  Phos  3.8     10-31  Mg     1.8     10-31    TPro  7.1  /  Alb  3.5  /  TBili  0.2  /  DBili  x   /  AST  9[L]  /  ALT  7[L]  /  AlkPhos  126[H]  10-31    PT/INR - ( 31 Oct 2024 05:30 )   PT: 11.3 sec;   INR: 0.98          PTT - ( 31 Oct 2024 05:30 )  PTT:26.4 sec  Urinalysis Basic - ( 31 Oct 2024 15:02 )    Color: x / Appearance: x / SG: x / pH: x  Gluc: 133 mg/dL / Ketone: x  / Bili: x / Urobili: x   Blood: x / Protein: x / Nitrite: x   Leuk Esterase: x / RBC: x / WBC x   Sq Epi: x / Non Sq Epi: x / Bacteria: x      CAPILLARY BLOOD GLUCOSE      POCT Blood Glucose.: 434 mg/dL (31 Oct 2024 22:01)      RADIOLOGY & ADDITIONAL TESTS: Reviewed. OVERNIGHT EVENTS: Glucose 434. Given 12u iSS and lantus 12u at bedtime.    SUBJECTIVE / INTERVAL HPI: Patient seen and examined at bedside. Pt resting comfortably. States he has pain in his b/l LE, which is not new for him, as he has peripheral neuropathy. Pt states he feels depressed this morning due to his health condition an d needing to start dialysis. Denies fevers, chills, H      PHYSICAL EXAM:    General: NAD  HEENT: NC/AT; PERRL, anicteric sclera; MMM  Neck: supple  Cardiovascular: +S1/S2, RRR  Respiratory: CTA B/L; no W/R/R  Gastrointestinal: soft, NT/ND; +BSx4  Extremities: WWP; no edema, clubbing or cyanosis  Vascular: 2+ radial, DP/PT pulses B/L  Neurological: AAOx3; no focal deficits  Psychiatric: pleasant mood and affect  Dermatologic: no appreciable wounds or damage to the skin    VITAL SIGNS:  Vital Signs Last 24 Hrs  T(C): 37.3 (01 Nov 2024 06:18), Max: 37.3 (01 Nov 2024 06:18)  T(F): 99.1 (01 Nov 2024 06:18), Max: 99.1 (01 Nov 2024 06:18)  HR: 80 (01 Nov 2024 06:18) (50 - 95)  BP: 149/69 (01 Nov 2024 06:18) (147/67 - 187/82)  BP(mean): 104 (31 Oct 2024 20:23) (97 - 118)  RR: 17 (01 Nov 2024 06:18) (16 - 18)  SpO2: 97% (01 Nov 2024 06:18) (97% - 99%)    Parameters below as of 01 Nov 2024 06:18  Patient On (Oxygen Delivery Method): room air          MEDICATIONS:  MEDICATIONS  (STANDING):  atorvastatin 10 milliGRAM(s) Oral at bedtime  dextrose 5%. 1000 milliLiter(s) (100 mL/Hr) IV Continuous <Continuous>  dextrose 5%. 1000 milliLiter(s) (50 mL/Hr) IV Continuous <Continuous>  dextrose 50% Injectable 25 Gram(s) IV Push once  dextrose 50% Injectable 12.5 Gram(s) IV Push once  dextrose 50% Injectable 25 Gram(s) IV Push once  epoetin martine-epbx (RETACRIT) Injectable 7000 Unit(s) IV Push once  gabapentin 100 milliGRAM(s) Oral daily  glucagon  Injectable 1 milliGRAM(s) IntraMuscular once  influenza   Vaccine 0.5 milliLiter(s) IntraMuscular once  insulin glargine Injectable (LANTUS) 12 Unit(s) SubCutaneous at bedtime  insulin lispro (ADMELOG) corrective regimen sliding scale   SubCutaneous Before meals and at bedtime  insulin lispro Injectable (ADMELOG) 5 Unit(s) SubCutaneous three times a day before meals  levothyroxine 50 MICROGram(s) Oral daily  NIFEdipine XL 60 milliGRAM(s) Oral every 24 hours  sodium bicarbonate 650 milliGRAM(s) Oral every 8 hours  tamsulosin 0.4 milliGRAM(s) Oral at bedtime    MEDICATIONS  (PRN):  acetaminophen     Tablet .. 650 milliGRAM(s) Oral every 6 hours PRN Mild Pain (1 - 3)  dextrose Oral Gel 15 Gram(s) Oral once PRN Blood Glucose LESS THAN 70 milliGRAM(s)/deciliter      ALLERGIES:  Allergies    No Known Allergies    Intolerances        LABS:                        8.7    5.05  )-----------( 139      ( 31 Oct 2024 05:30 )             24.4     10-31    132[L]  |  105  |  66[H]  ----------------------------<  133[H]  4.1   |  20[L]  |  5.67[H]    Ca    8.5      31 Oct 2024 15:02  Phos  3.8     10-31  Mg     1.8     10-31    TPro  7.1  /  Alb  3.5  /  TBili  0.2  /  DBili  x   /  AST  9[L]  /  ALT  7[L]  /  AlkPhos  126[H]  10-31    PT/INR - ( 31 Oct 2024 05:30 )   PT: 11.3 sec;   INR: 0.98          PTT - ( 31 Oct 2024 05:30 )  PTT:26.4 sec  Urinalysis Basic - ( 31 Oct 2024 15:02 )    Color: x / Appearance: x / SG: x / pH: x  Gluc: 133 mg/dL / Ketone: x  / Bili: x / Urobili: x   Blood: x / Protein: x / Nitrite: x   Leuk Esterase: x / RBC: x / WBC x   Sq Epi: x / Non Sq Epi: x / Bacteria: x      CAPILLARY BLOOD GLUCOSE      POCT Blood Glucose.: 434 mg/dL (31 Oct 2024 22:01)      RADIOLOGY & ADDITIONAL TESTS: Reviewed. OVERNIGHT EVENTS: Glucose 434. Given 12u iSS and lantus 12u at bedtime.    SUBJECTIVE / INTERVAL HPI: Patient seen and examined at bedside. Pt resting comfortably. States he has pain in his b/l LE, which is not new for him, as he has peripheral neuropathy. Pt states he feels depressed this morning due to his health condition an d needing to start dialysis. Reports that he was not eating much prior to his hospitalization. States he is having good output from his ostomy bag. Denies fevers, chills, HA, chest pain, SOB, n/v/d, abd pain, dysuria. ROS otherwise negative.      PHYSICAL EXAM:    General: NAD  HEENT: NC/AT; PERRL, anicteric sclera; MMM  Neck: supple  Cardiovascular: +S1/S2, RRR  Respiratory: CTA B/L; no W/R/R  Gastrointestinal: soft, NT/ND; +BSx4  Extremities: WWP; no edema, clubbing or cyanosis  Vascular: 2+ radial, DP/PT pulses B/L  Neurological: AAOx3; no focal deficits  Psychiatric: pleasant mood and affect  Dermatologic: no appreciable wounds or damage to the skin    VITAL SIGNS:  Vital Signs Last 24 Hrs  T(C): 37.3 (01 Nov 2024 06:18), Max: 37.3 (01 Nov 2024 06:18)  T(F): 99.1 (01 Nov 2024 06:18), Max: 99.1 (01 Nov 2024 06:18)  HR: 80 (01 Nov 2024 06:18) (50 - 95)  BP: 149/69 (01 Nov 2024 06:18) (147/67 - 187/82)  BP(mean): 104 (31 Oct 2024 20:23) (97 - 118)  RR: 17 (01 Nov 2024 06:18) (16 - 18)  SpO2: 97% (01 Nov 2024 06:18) (97% - 99%)    Parameters below as of 01 Nov 2024 06:18  Patient On (Oxygen Delivery Method): room air          MEDICATIONS:  MEDICATIONS  (STANDING):  atorvastatin 10 milliGRAM(s) Oral at bedtime  dextrose 5%. 1000 milliLiter(s) (100 mL/Hr) IV Continuous <Continuous>  dextrose 5%. 1000 milliLiter(s) (50 mL/Hr) IV Continuous <Continuous>  dextrose 50% Injectable 25 Gram(s) IV Push once  dextrose 50% Injectable 12.5 Gram(s) IV Push once  dextrose 50% Injectable 25 Gram(s) IV Push once  epoetin martine-epbx (RETACRIT) Injectable 7000 Unit(s) IV Push once  gabapentin 100 milliGRAM(s) Oral daily  glucagon  Injectable 1 milliGRAM(s) IntraMuscular once  influenza   Vaccine 0.5 milliLiter(s) IntraMuscular once  insulin glargine Injectable (LANTUS) 12 Unit(s) SubCutaneous at bedtime  insulin lispro (ADMELOG) corrective regimen sliding scale   SubCutaneous Before meals and at bedtime  insulin lispro Injectable (ADMELOG) 5 Unit(s) SubCutaneous three times a day before meals  levothyroxine 50 MICROGram(s) Oral daily  NIFEdipine XL 60 milliGRAM(s) Oral every 24 hours  sodium bicarbonate 650 milliGRAM(s) Oral every 8 hours  tamsulosin 0.4 milliGRAM(s) Oral at bedtime    MEDICATIONS  (PRN):  acetaminophen     Tablet .. 650 milliGRAM(s) Oral every 6 hours PRN Mild Pain (1 - 3)  dextrose Oral Gel 15 Gram(s) Oral once PRN Blood Glucose LESS THAN 70 milliGRAM(s)/deciliter      ALLERGIES:  Allergies    No Known Allergies    Intolerances        LABS:                        8.7    5.05  )-----------( 139      ( 31 Oct 2024 05:30 )             24.4     10-31    132[L]  |  105  |  66[H]  ----------------------------<  133[H]  4.1   |  20[L]  |  5.67[H]    Ca    8.5      31 Oct 2024 15:02  Phos  3.8     10-31  Mg     1.8     10-31    TPro  7.1  /  Alb  3.5  /  TBili  0.2  /  DBili  x   /  AST  9[L]  /  ALT  7[L]  /  AlkPhos  126[H]  10-31    PT/INR - ( 31 Oct 2024 05:30 )   PT: 11.3 sec;   INR: 0.98          PTT - ( 31 Oct 2024 05:30 )  PTT:26.4 sec  Urinalysis Basic - ( 31 Oct 2024 15:02 )    Color: x / Appearance: x / SG: x / pH: x  Gluc: 133 mg/dL / Ketone: x  / Bili: x / Urobili: x   Blood: x / Protein: x / Nitrite: x   Leuk Esterase: x / RBC: x / WBC x   Sq Epi: x / Non Sq Epi: x / Bacteria: x      CAPILLARY BLOOD GLUCOSE      POCT Blood Glucose.: 434 mg/dL (31 Oct 2024 22:01)      RADIOLOGY & ADDITIONAL TESTS: Reviewed. OVERNIGHT EVENTS: Glucose 434. Given 12u iSS and lantus 12u at bedtime.    SUBJECTIVE / INTERVAL HPI: Patient seen and examined at bedside. Pt resting comfortably. States he has pain in his b/l LE, which is not new for him, as he has peripheral neuropathy. Pt states he feels depressed this morning due to his health condition an d needing to start dialysis. Reports that he was not eating much prior to his hospitalization. States he is having good output from his ostomy bag. Denies fevers, chills, HA, chest pain, SOB, n/v/d, abd pain, dysuria. ROS otherwise negative.      PHYSICAL EXAM:    General: NAD  HEENT: NC/AT; PERRL, anicteric sclera; MMM  Neck: supple  Cardiovascular: +S1/S2, RRR  Respiratory: CTA B/L; no W/R/R  Gastrointestinal: soft, NT/ND; +BSx4; ostomy bag in place draining brown stool, no signs of drainage  Extremities: WWP; no edema, clubbing or cyanosis  Vascular: 2+ radial, DP/PT pulses B/L  Neurological: AAOx3; no focal deficits  Psychiatric: pleasant mood and affect  Dermatologic: no appreciable wounds or damage to the skin    VITAL SIGNS:  Vital Signs Last 24 Hrs  T(C): 37.3 (01 Nov 2024 06:18), Max: 37.3 (01 Nov 2024 06:18)  T(F): 99.1 (01 Nov 2024 06:18), Max: 99.1 (01 Nov 2024 06:18)  HR: 80 (01 Nov 2024 06:18) (50 - 95)  BP: 149/69 (01 Nov 2024 06:18) (147/67 - 187/82)  BP(mean): 104 (31 Oct 2024 20:23) (97 - 118)  RR: 17 (01 Nov 2024 06:18) (16 - 18)  SpO2: 97% (01 Nov 2024 06:18) (97% - 99%)    Parameters below as of 01 Nov 2024 06:18  Patient On (Oxygen Delivery Method): room air          MEDICATIONS:  MEDICATIONS  (STANDING):  atorvastatin 10 milliGRAM(s) Oral at bedtime  dextrose 5%. 1000 milliLiter(s) (100 mL/Hr) IV Continuous <Continuous>  dextrose 5%. 1000 milliLiter(s) (50 mL/Hr) IV Continuous <Continuous>  dextrose 50% Injectable 25 Gram(s) IV Push once  dextrose 50% Injectable 12.5 Gram(s) IV Push once  dextrose 50% Injectable 25 Gram(s) IV Push once  epoetin martine-epbx (RETACRIT) Injectable 7000 Unit(s) IV Push once  gabapentin 100 milliGRAM(s) Oral daily  glucagon  Injectable 1 milliGRAM(s) IntraMuscular once  influenza   Vaccine 0.5 milliLiter(s) IntraMuscular once  insulin glargine Injectable (LANTUS) 12 Unit(s) SubCutaneous at bedtime  insulin lispro (ADMELOG) corrective regimen sliding scale   SubCutaneous Before meals and at bedtime  insulin lispro Injectable (ADMELOG) 5 Unit(s) SubCutaneous three times a day before meals  levothyroxine 50 MICROGram(s) Oral daily  NIFEdipine XL 60 milliGRAM(s) Oral every 24 hours  sodium bicarbonate 650 milliGRAM(s) Oral every 8 hours  tamsulosin 0.4 milliGRAM(s) Oral at bedtime    MEDICATIONS  (PRN):  acetaminophen     Tablet .. 650 milliGRAM(s) Oral every 6 hours PRN Mild Pain (1 - 3)  dextrose Oral Gel 15 Gram(s) Oral once PRN Blood Glucose LESS THAN 70 milliGRAM(s)/deciliter      ALLERGIES:  Allergies    No Known Allergies    Intolerances        LABS:                        8.7    5.05  )-----------( 139      ( 31 Oct 2024 05:30 )             24.4     10-31    132[L]  |  105  |  66[H]  ----------------------------<  133[H]  4.1   |  20[L]  |  5.67[H]    Ca    8.5      31 Oct 2024 15:02  Phos  3.8     10-31  Mg     1.8     10-31    TPro  7.1  /  Alb  3.5  /  TBili  0.2  /  DBili  x   /  AST  9[L]  /  ALT  7[L]  /  AlkPhos  126[H]  10-31    PT/INR - ( 31 Oct 2024 05:30 )   PT: 11.3 sec;   INR: 0.98          PTT - ( 31 Oct 2024 05:30 )  PTT:26.4 sec  Urinalysis Basic - ( 31 Oct 2024 15:02 )    Color: x / Appearance: x / SG: x / pH: x  Gluc: 133 mg/dL / Ketone: x  / Bili: x / Urobili: x   Blood: x / Protein: x / Nitrite: x   Leuk Esterase: x / RBC: x / WBC x   Sq Epi: x / Non Sq Epi: x / Bacteria: x      CAPILLARY BLOOD GLUCOSE      POCT Blood Glucose.: 434 mg/dL (31 Oct 2024 22:01)      RADIOLOGY & ADDITIONAL TESTS: Reviewed.

## 2024-11-01 NOTE — PROGRESS NOTE ADULT - ATTENDING COMMENTS
65 yo M with PMHx HTN, HLD, DM, CKD, hypothyroidism, hx toxic megacolon (s/p colostomy), etOH use BIBEMS from home 10/29 with 1-2d hx of dizziness and blurry vision, admitted to [facility]/telemetry for MEG on CKD c/b NAGMA requiring bicarb gtt, s/p stepdown to [facility] for ongoing management and tentative plan for initiation of new HD.     ESRD W new HD   Renal following (CKD progression to ESRD)   Plan to initiate dialysis 11/1   Bicarb gtt DC’d -> Resumed home sodium bicarb  IR consulted for HD catheter placement    fu Hep panel, quant , ppd   will need 3 x HD   Hold DVT ppx   SW evaluation (New HD)       Uncontrolled DM 2 w complication of diabetic neuropatho   a1c 9.2  will adjust L/ premeal and HSS   npo for now for IR procedure       anemia   suspected ACD   11.1 to 8.7  PT evaluation (Home with HPT)    dvt ppx on hold

## 2024-11-01 NOTE — DIETITIAN INITIAL EVALUATION ADULT - NS FNS DIET ORDER
Diet, NPO after Midnight:      NPO Start Date: 31-Oct-2024,   NPO Start Time: 23:59  Except Medications (10-31-24 @ 20:38)  Diet, Consistent Carbohydrate Renal/No Snacks (10-31-24 @ 14:21)

## 2024-11-01 NOTE — PROGRESS NOTE ADULT - PROBLEM SELECTOR PLAN 3
RESOLVED. Pt presented to ED with confusion. Utox negative, CT head negative. Patient markedly hypovolemic on physical exam in ED. Etiology likely 2/2 hypovolemia. Patient mental status resolved, now AOx3 s/p 2L NS.    - Continue to monitor for acute changes in mental status

## 2024-11-01 NOTE — PROGRESS NOTE ADULT - PROBLEM SELECTOR PLAN 10
Fluids - S/p 5% dextrose w/ bicarb  Electrolytes - Replete to Mg>2, Phos>3, K>4  Nutrition - Consistent carbohydrate  GI Prophylaxis - None  VTE Prophylaxis - Heparin 5000U subq  Code Status - Full code

## 2024-11-01 NOTE — DIETITIAN INITIAL EVALUATION ADULT - OTHER INFO
64M with PMH of HTN, hypothyroidism, HLD, DM, CKD3, colostomy, toxic megacolon, ETOH use disorder, and ?cirrhosis who presented with weakness, found to have HAGMA secondary to MEG on CKD, admitted to [facility] for bicarb drip, now improving status post fluids. Stepped down to [facility] 10/31.    Pt seen on 7[facility] for assessment. Labs and medication orders reviewed. Ordered for 25U lantus, 5U premeal admelog, sodium bicarbonate, retacrit, PO synthroid. Na 132 <low>, K/Mg WNL, Phos 2.3 <low>, BUN/Cr 65/5.57 <high>, POC blood glucose (10/31-11/1) 126-434, HgbA1c 9.2% (10/31). On Consistent Carbohydrate Renal diet. Pt reports good appetite and completing meals in-house. Notes typically good intake PTA however at times variable in setting of living in shelter. Pt reports UBW ~160lb; ?accuracy stated admission wt 150lb as RD obtained bed scale wt this AM 156lb consistent with pt reports and indicates relative wt stability. No overt signs of wasting i 64M with PMH of HTN, hypothyroidism, HLD, DM, CKD3, colostomy, toxic megacolon, ETOH use disorder, and ?cirrhosis who presented with weakness, found to have HAGMA secondary to MEG on CKD, admitted to [facility] for bicarb drip, now improving status post fluids. Stepped down to [facility] 10/31. Pending HD catheter placement and initiation of HD.     Pt seen on 7[facility] for assessment. Labs and medication orders reviewed. Ordered for 25U lantus, 5U premeal admelog, sodium bicarbonate, retacrit, PO synthroid. Na 132 <low>, K/Mg WNL, Phos 2.3 <low>, BUN/Cr 65/5.57 <high>, POC blood glucose (10/31-11/1) 126-434, HgbA1c 9.2% (10/31). NPO pending HD catheter placement today. Pt reports good appetite and completing meals in-house. Notes typically good intake PTA however at times variable in setting of living in shelter. Pt reports UBW ~160lb; ?accuracy stated admission wt 150lb as RD obtained bed scale wt this AM 156lb consistent with pt reports and indicates relative wt stability. No overt signs of wasting identified. Pt denies difficulty chewing/swallowing. Denies nausea/vomiting. Ostomy noted, pt reports normal output. Confirms no known food allergies. No Confucianist/ethnic/cultural food preferences noted. No pressure injuries or edema documented, Kendell score 19. RD provided written and verbal nutrition education. See nutrition recommendations. RD to remain available.

## 2024-11-01 NOTE — DIETITIAN INITIAL EVALUATION ADULT - PERTINENT MEDS FT
MEDICATIONS  (STANDING):  atorvastatin 10 milliGRAM(s) Oral at bedtime  dextrose 5%. 1000 milliLiter(s) (50 mL/Hr) IV Continuous <Continuous>  dextrose 5%. 1000 milliLiter(s) (100 mL/Hr) IV Continuous <Continuous>  dextrose 50% Injectable 25 Gram(s) IV Push once  dextrose 50% Injectable 12.5 Gram(s) IV Push once  dextrose 50% Injectable 25 Gram(s) IV Push once  epoetin martine-epbx (RETACRIT) Injectable 7000 Unit(s) IV Push once  gabapentin 100 milliGRAM(s) Oral every 12 hours  glucagon  Injectable 1 milliGRAM(s) IntraMuscular once  influenza   Vaccine 0.5 milliLiter(s) IntraMuscular once  insulin glargine Injectable (LANTUS) 25 Unit(s) SubCutaneous at bedtime  insulin lispro (ADMELOG) corrective regimen sliding scale   SubCutaneous Before meals and at bedtime  insulin lispro Injectable (ADMELOG) 5 Unit(s) SubCutaneous three times a day before meals  levothyroxine 50 MICROGram(s) Oral daily  NIFEdipine XL 60 milliGRAM(s) Oral every 24 hours  PPD  5 Tuberculin Unit(s) Injectable 5 Unit(s) IntraDermal once  sodium bicarbonate 650 milliGRAM(s) Oral every 8 hours  tamsulosin 0.4 milliGRAM(s) Oral at bedtime    MEDICATIONS  (PRN):  acetaminophen     Tablet .. 650 milliGRAM(s) Oral every 6 hours PRN Mild Pain (1 - 3)  dextrose Oral Gel 15 Gram(s) Oral once PRN Blood Glucose LESS THAN 70 milliGRAM(s)/deciliter

## 2024-11-01 NOTE — DIETITIAN INITIAL EVALUATION ADULT - EDUCATION DIETARY MODIFICATIONS
Educated on nutrition therapy for blood glucose and phosphorus/potassium control. Discussed balancing therapeutic diet restrictions and adequate kcal/protein intake in setting of socio-economic limitations. Provided list of local [facility] food resources. Pt aware RD remains available for additional questions/concerns, RD provided contact information./(2) meets goals/outcomes/verbalization

## 2024-11-01 NOTE — PROGRESS NOTE ADULT - ATTENDING COMMENTS
See the Fellow's note written above, for details. I reviewed the fellow documentation.     Patient was seen and evaluated on dialysis.   Patient is tolerating the procedure well.     Continue HD as above

## 2024-11-01 NOTE — PROGRESS NOTE ADULT - SUBJECTIVE AND OBJECTIVE BOX
SUBJECTIVE / INTERVAL HPI: Patient seen and examined at bedside. Feels well overall. Patient denying chest pain, SOB, palpitations, cough. Patient denies fever, chills, HA, Dizziness, N/V, abdominal pain, diarrhea, constipation, hematochezia/melena, dysuria, hematuria, new onset weakness/numbness, LE pain and/or swelling.     Remaining ROS negative       PHYSICAL EXAM:    General:NAD.   HEENT: NC/AT; PERRL, anicteric sclera; MMM  Neck: supple  Cardiovascular: +S1/S2, RRR  Respiratory: CTA B/L; no W/R/R  Gastrointestinal: soft, NT/ND; +BSx4, +ileostomy bag in place draining brown stool, no signs of pus, purulent drainage, or blood in bag,   Extremities: WWP; no edema, clubbing or cyanosis, no asterixis   Neurological: AAOx3;  Psychiatric: pleasant mood and affect  Dermatologic: no appreciable wounds or damage to the skin    VITAL SIGNS:  Vital Signs Last 24 Hrs  T(C): 36.7 (31 Oct 2024 05:29), Max: 37.2 (30 Oct 2024 13:29)  T(F): 98 (31 Oct 2024 05:29), Max: 99 (30 Oct 2024 17:00)  HR: 44 (31 Oct 2024 05:02) (44 - 80)  BP: 148/63 (31 Oct 2024 05:02) (134/63 - 168/73)  BP(mean): 91 (31 Oct 2024 05:02) (90 - 108)  RR: 16 (31 Oct 2024 05:02) (16 - 18)  SpO2: 98% (31 Oct 2024 05:02) (96% - 100%)    Parameters below as of 31 Oct 2024 05:02  Patient On (Oxygen Delivery Method): room air        MEDICATIONS:  MEDICATIONS  (STANDING):  atorvastatin 10 milliGRAM(s) Oral at bedtime  dextrose 5% 1000 milliLiter(s) (100 mL/Hr) IV Continuous <Continuous>  dextrose 5%. 1000 milliLiter(s) (100 mL/Hr) IV Continuous <Continuous>  dextrose 5%. 1000 milliLiter(s) (50 mL/Hr) IV Continuous <Continuous>  dextrose 50% Injectable 25 Gram(s) IV Push once  dextrose 50% Injectable 12.5 Gram(s) IV Push once  dextrose 50% Injectable 25 Gram(s) IV Push once  glucagon  Injectable 1 milliGRAM(s) IntraMuscular once  heparin   Injectable 5000 Unit(s) SubCutaneous every 8 hours  influenza   Vaccine 0.5 milliLiter(s) IntraMuscular once  insulin glargine Injectable (LANTUS) 10 Unit(s) SubCutaneous at bedtime  insulin lispro (ADMELOG) corrective regimen sliding scale   SubCutaneous Before meals and at bedtime  insulin lispro Injectable (ADMELOG) 4 Unit(s) SubCutaneous three times a day before meals  levothyroxine 50 MICROGram(s) Oral daily  NIFEdipine XL 60 milliGRAM(s) Oral daily  tamsulosin 0.4 milliGRAM(s) Oral at bedtime    MEDICATIONS  (PRN):  acetaminophen     Tablet .. 650 milliGRAM(s) Oral every 6 hours PRN Mild Pain (1 - 3)  dextrose Oral Gel 15 Gram(s) Oral once PRN Blood Glucose LESS THAN 70 milliGRAM(s)/deciliter      ALLERGIES:  Allergies    No Known Allergies    Intolerances          LABS:                          9.5    4.76  )-----------( 157      ( 01 Nov 2024 05:30 )             27.4     11-01    132[L]  |  100  |  65[H]  ----------------------------<  434[H]  4.4   |  19[L]  |  5.57[H]    Ca    8.4      01 Nov 2024 05:30  Phos  2.3     11-01  Mg     1.8     11-01    TPro  7.6  /  Alb  3.8  /  TBili  0.2  /  DBili  x   /  AST  15  /  ALT  13  /  AlkPhos  168[H]  11-01    LIVER FUNCTIONS - ( 01 Nov 2024 05:30 )  Alb: 3.8 g/dL / Pro: 7.6 g/dL / ALK PHOS: 168 U/L / ALT: 13 U/L / AST: 15 U/L / GGT: x           PT/INR - ( 01 Nov 2024 05:30 )   PT: 11.1 sec;   INR: 0.95          PTT - ( 01 Nov 2024 05:30 )  PTT:27.2 sec  Urinalysis Basic - ( 01 Nov 2024 05:30 )    Color: x / Appearance: x / SG: x / pH: x  Gluc: 434 mg/dL / Ketone: x  / Bili: x / Urobili: x   Blood: x / Protein: x / Nitrite: x   Leuk Esterase: x / RBC: x / WBC x   Sq Epi: x / Non Sq Epi: x / Bacteria: x       SUBJECTIVE / INTERVAL HPI: Patient seen and examined at bedside. Feels well overall. Patient denying chest pain, SOB, palpitations, cough. Patient denies fever, chills, HA, Dizziness, N/V, abdominal pain, diarrhea, constipation, hematochezia/melena, dysuria, hematuria, new onset weakness/numbness, LE pain and/or swelling.     Remaining ROS negative       PHYSICAL EXAM:    General:NAD.   HEENT: NC/AT; PERRL, anicteric sclera; MMM  Neck: supple  Cardiovascular: +S1/S2, RRR  Respiratory: CTA B/L; no W/R/R  Gastrointestinal: soft, NT/ND; +BSx4, +ileostomy bag in place draining brown stool, no signs of pus, purulent drainage, or blood in bag,   Extremities: WWP; no edema, clubbing or cyanosis, no asterixis   Neurological: AAOx3;  Psychiatric: pleasant mood and affect  Dermatologic: no appreciable wounds or damage to the skin    VITAL SIGNS:  Vital Signs Last 24 Hrs  T(C): 36.7 (31 Oct 2024 05:29), Max: 37.2 (30 Oct 2024 13:29)  T(F): 98 (31 Oct 2024 05:29), Max: 99 (30 Oct 2024 17:00)  HR: 44 (31 Oct 2024 05:02) (44 - 80)  BP: 148/63 (31 Oct 2024 05:02) (134/63 - 168/73)  BP(mean): 91 (31 Oct 2024 05:02) (90 - 108)  RR: 16 (31 Oct 2024 05:02) (16 - 18)  SpO2: 98% (31 Oct 2024 05:02) (96% - 100%)    Parameters below as of 31 Oct 2024 05:02  Patient On (Oxygen Delivery Method): room air        MEDICATIONS:  MEDICATIONS  (STANDING):  atorvastatin 10 milliGRAM(s) Oral at bedtime  dextrose 5% 1000 milliLiter(s) (100 mL/Hr) IV Continuous <Continuous>  dextrose 5%. 1000 milliLiter(s) (100 mL/Hr) IV Continuous <Continuous>  dextrose 5%. 1000 milliLiter(s) (50 mL/Hr) IV Continuous <Continuous>  dextrose 50% Injectable 25 Gram(s) IV Push once  dextrose 50% Injectable 12.5 Gram(s) IV Push once  dextrose 50% Injectable 25 Gram(s) IV Push once  glucagon  Injectable 1 milliGRAM(s) IntraMuscular once  heparin   Injectable 5000 Unit(s) SubCutaneous every 8 hours  influenza   Vaccine 0.5 milliLiter(s) IntraMuscular once  insulin glargine Injectable (LANTUS) 10 Unit(s) SubCutaneous at bedtime  insulin lispro (ADMELOG) corrective regimen sliding scale   SubCutaneous Before meals and at bedtime  insulin lispro Injectable (ADMELOG) 4 Unit(s) SubCutaneous three times a day before meals  levothyroxine 50 MICROGram(s) Oral daily  NIFEdipine XL 60 milliGRAM(s) Oral daily  tamsulosin 0.4 milliGRAM(s) Oral at bedtime    MEDICATIONS  (PRN):  acetaminophen     Tablet .. 650 milliGRAM(s) Oral every 6 hours PRN Mild Pain (1 - 3)  dextrose Oral Gel 15 Gram(s) Oral once PRN Blood Glucose LESS THAN 70 milliGRAM(s)/deciliter      ALLERGIES:  Allergies    No Known Allergies    Intolerances          LABS:                          9.5    4.76  )-----------( 157      ( 2024 05:30 )             27.4         132[L]  |  100  |  65[H]  ----------------------------<  434[H]  4.4   |  19[L]  |  5.57[H]    Ca    8.4      2024 05:30  Phos  2.3       Mg     1.8         TPro  7.6  /  Alb  3.8  /  TBili  0.2  /  DBili  x   /  AST  15  /  ALT  13  /  AlkPhos  168[H]      LIVER FUNCTIONS - ( 2024 05:30 )  Alb: 3.8 g/dL / Pro: 7.6 g/dL / ALK PHOS: 168 U/L / ALT: 13 U/L / AST: 15 U/L / GGT: x           PT/INR - ( 2024 05:30 )   PT: 11.1 sec;   INR: 0.95          PTT - ( 2024 05:30 )  PTT:27.2 sec  Urinalysis Basic - ( 2024 05:30 )    Color: x / Appearance: x / SG: x / pH: x  Gluc: 434 mg/dL / Ketone: x  / Bili: x / Urobili: x   Blood: x / Protein: x / Nitrite: x   Leuk Esterase: x / RBC: x / WBC x   Sq Epi: x / Non Sq Epi: x / Bacteria: x      Hemoglobin: 9.5 g/dL (24 @ 05:30)  Phosphorus: 2.3 mg/dL (24 @ 05:30)  Hemoglobin: 8.7 g/dL (10-31-24 @ 05:30)  Phosphorus: 3.8 mg/dL (10-31-24 @ 05:30)    Albumin: 3.8 g/dL (24 @ 05:30)  Hepatitis B Surface Antigen: Nonreact (24 @ 05:30)    epoetin martine-epbx (RETACRIT) Injectable 7000 Unit(s) IV Push once, 10-31-24 @ 12:46, Routine, Stop order after: 1 Doses      Hemodialysis Treatment.:     Schedule: Once, Modality: Hemodialysis, Access: Internal Jugular Central Venous Catheter    Dialyzer: Optiflux [id number], Time: 120 Min    Blood Flow: 200 mL/Min , Dialysate Flow: 400 mL/Min, Dialysate Temp: 36.5, Tubinmm (Adult)    Target Fluid Removal: 0 Liters    Dialysate Electrolytes (mEq/L): Potassium 3, Calcium 2.5, Sodium 138, Bicarbonate 35 (24 @ 16:41) [Completed]

## 2024-11-01 NOTE — DIETITIAN INITIAL EVALUATION ADULT - ADD RECOMMEND
1. As medically feasible status post procedure, recommend Consistent Carbohydrate diet.   >>Recommend no renal restriction as electrolytes consistently WNL/low.   >>Encourage & monitor PO intake. [place] dietary preferences as able.   2. Monitor GI tolerance, weight trends, labs, & skin integrity.  3. Defer bowel and pain regimens to team.   4. RD to remain available for diet education/intervention prn.

## 2024-11-01 NOTE — DIETITIAN INITIAL EVALUATION ADULT - OTHER CALCULATIONS
Estimated needs based on RD obtained bed scale wt as within % IBW 142lb/64.5kg (110%). Needs adjusted for age, plan for HD, and clinical status.   Defer fluids to team.

## 2024-11-01 NOTE — PROGRESS NOTE ADULT - PROBLEM SELECTOR PLAN 8
On home Levothyroxine 50mcg daily, but states that he is not currently taking it outside of the hospital.    - Continue home meds

## 2024-11-01 NOTE — PROGRESS NOTE ADULT - PROBLEM SELECTOR PLAN 6
Pt reports Trazodone 100mg QHS as home med. On 10/31, Pt HR in 40s o/n after trazodone given for sleep. Trazodone has cholinergic effects that can lead to bradycardia.  Pt HR improved to 60s during the day.     - HOLD trazodone, can consider melatonin for sleep instead or lower dose of trazodone (25mg or 50mg) if complains of insomnia

## 2024-11-01 NOTE — DIETITIAN INITIAL EVALUATION ADULT - LITERATURE/VIDEOS GIVEN
Carb Counting, Plate Method, Using Nutrition Facts Label, Phosphorus Content of Foods, Potassium Content of Foods, Local [facility] Food Resources

## 2024-11-01 NOTE — PROGRESS NOTE ADULT - PROBLEM SELECTOR PLAN 7
Per previous hospitalization, seems baseline around 9-10 per outpatient records, currently 11.    - No intervention at this time, continue to monitor

## 2024-11-01 NOTE — PROGRESS NOTE ADULT - PROBLEM SELECTOR PLAN 1
Pt with CKD 3 (baseline Cr 2.3-2.5), currently with Cr 6.47. Etiology likely pre-renal in setting poor PO intake. Patient will likely need HD as likely not to recover kidney function.   UA shows bacteria and LE; however pt remains asymptomatic. no indication for treatment.    - Nephro, IR consulted, recs appreciated  - Plan for HD tunnel cath placement 11/1 with IR Pt with CKD 3 (baseline Cr 2.3-2.5), currently with Cr 6.47. Etiology likely pre-renal in setting poor PO intake. Patient will likely need HD as likely not to recover kidney function.   UA shows bacteria and LE; however pt remains asymptomatic. no indication for treatment.    - Nephro consulted, f/u recs  - Pt to have TDC placed with IR today  - Will initiate HD after  - Will place PPD  - F/u Quantiferon  - Hepatitis panel neg

## 2024-11-01 NOTE — DIETITIAN INITIAL EVALUATION ADULT - PERTINENT LABORATORY DATA
11-01    132[L]  |  100  |  65[H]  ----------------------------<  434[H]  4.4   |  19[L]  |  5.57[H]    Ca    8.4      01 Nov 2024 05:30  Phos  2.3     11-01  Mg     1.8     11-01    TPro  7.6  /  Alb  3.8  /  TBili  0.2  /  DBili  x   /  AST  15  /  ALT  13  /  AlkPhos  168[H]  11-01  POCT Blood Glucose.: 269 mg/dL (11-01-24 @ 12:01)  A1C with Estimated Average Glucose Result: 9.2 % (10-31-24 @ 05:30)

## 2024-11-01 NOTE — PROGRESS NOTE ADULT - PROBLEM SELECTOR PLAN 2
IMPROVED. Etiology likely uremia 2/2 MEG. Pt with pH 7.19 on VBG. AG 14, improved to 12 with improvement in BUN. Repeat VBG and BMP wnl (VBG 10/30 7.33, Bicarb 20, BMP Na 131 this AM)    - Nephro recs appreciated  - Restart home bicarb tabs TID IMPROVED. Etiology likely uremia 2/2 MEG. Pt with pH 7.19 on VBG. AG 14, improved to 12 with improvement in BUN. Repeat VBG and BMP wnl (VBG 10/30 7.33, Bicarb 20, BMP Na 131 this AM)    - F/u nephro recs  - C/w home bicarb tabs TID

## 2024-11-01 NOTE — PROGRESS NOTE ADULT - ASSESSMENT
62 YOM PMHx IDDMII, hypothyroidism, hypertension, s/p colostomy, previous issues with electrolyte disturbances inc hyponatremia, CKD III baseline sCr 2.1-2.5, presents to ED from [facility] with confusion, ostomy bag break, unable to provide history.  Found to have UTI, hyperkalemia.  Baseline renal function seems to be around sCr of 2.1-2.5.  Nephrology consulted for MEG on CKD.  Hyponatremia resolved. Patient asymptomatic. Planned for tunneled catheter insertion today to start hemodialysis       #MEG on CKD   Patient noted to have MEG on admission with  Cr 6.47 over baseline 2.5. Remains elevated today at 5.57. Enlarged Kidneys seen on recent US. Likely secondary to prolonged uncontrolled diabetes mellitus.   - Concern for progression to ESRD. Patient presented with collapse after leg weakness and shaking. Potassium on admission 5.5, however on multiple prior admissions to [facility] and [facility] he has had potassium ranges as high as 7.0 & 8.0. Cr has remained elevated at close to 3.0 on all prior admission in the last year, likely close to his actual baseline.  - Planned for Tunneled Catheter Placement today and initiation of hemodialysis     Plan  - trend BMP & Cr  - Tunneled catheter placement today   - Initiation of dialysis: will start slow, 2 hr session to avoid significantly dropping BUN or sodium levels that can result in brain edema. BUN decrease by no more than 30%.   - avoid nephrotoxic drugs, renally dose meds          #Non-Anion Gap Metabolic Acidosis   - ****Improving*******  - Acidosis improving s/p administration of sodium bicarb infusions. Repeat BMP with CO2: 19, today. [place] 2/2 to uremia, some bicarb loss from ileostomy.   - Normal Anion GAP      Recommendations:     - Continue home sodium bicarb tabs   - Continue to trend BMP         #Hyponatremia   *********Resolved********  - Sodium of 140 when corrected for hyperglycemia     Recommendations:   - c/w sodium bicarb tabs   - glycemic control   - continue to monitor

## 2024-11-01 NOTE — PROGRESS NOTE ADULT - PROBLEM SELECTOR PLAN 5
On home Nifedipine 60mg daily, but states that he is not currently taking it outside of the hospital.    - Continue home meds

## 2024-11-02 LAB
ANION GAP SERPL CALC-SCNC: 11 MMOL/L — SIGNIFICANT CHANGE UP (ref 5–17)
BUN SERPL-MCNC: 36 MG/DL — HIGH (ref 7–23)
CALCIUM SERPL-MCNC: 8.6 MG/DL — SIGNIFICANT CHANGE UP (ref 8.4–10.5)
CHLORIDE SERPL-SCNC: 102 MMOL/L — SIGNIFICANT CHANGE UP (ref 96–108)
CO2 SERPL-SCNC: 24 MMOL/L — SIGNIFICANT CHANGE UP (ref 22–31)
CREAT SERPL-MCNC: 4.2 MG/DL — HIGH (ref 0.5–1.3)
EGFR: 15 ML/MIN/1.73M2 — LOW
GLUCOSE BLDC GLUCOMTR-MCNC: 104 MG/DL — HIGH (ref 70–99)
GLUCOSE BLDC GLUCOMTR-MCNC: 173 MG/DL — HIGH (ref 70–99)
GLUCOSE BLDC GLUCOMTR-MCNC: 182 MG/DL — HIGH (ref 70–99)
GLUCOSE BLDC GLUCOMTR-MCNC: 355 MG/DL — HIGH (ref 70–99)
GLUCOSE SERPL-MCNC: 243 MG/DL — HIGH (ref 70–99)
HCT VFR BLD CALC: 29.3 % — LOW (ref 39–50)
HGB BLD-MCNC: 10.1 G/DL — LOW (ref 13–17)
MAGNESIUM SERPL-MCNC: 1.9 MG/DL — SIGNIFICANT CHANGE UP (ref 1.6–2.6)
MCHC RBC-ENTMCNC: 31 PG — SIGNIFICANT CHANGE UP (ref 27–34)
MCHC RBC-ENTMCNC: 34.5 G/DL — SIGNIFICANT CHANGE UP (ref 32–36)
MCV RBC AUTO: 89.9 FL — SIGNIFICANT CHANGE UP (ref 80–100)
NRBC # BLD: 0 /100 WBCS — SIGNIFICANT CHANGE UP (ref 0–0)
PHOSPHATE SERPL-MCNC: 3.5 MG/DL — SIGNIFICANT CHANGE UP (ref 2.5–4.5)
PLATELET # BLD AUTO: 166 K/UL — SIGNIFICANT CHANGE UP (ref 150–400)
POTASSIUM SERPL-MCNC: 4.3 MMOL/L — SIGNIFICANT CHANGE UP (ref 3.5–5.3)
POTASSIUM SERPL-SCNC: 4.3 MMOL/L — SIGNIFICANT CHANGE UP (ref 3.5–5.3)
RBC # BLD: 3.26 M/UL — LOW (ref 4.2–5.8)
RBC # FLD: 12.9 % — SIGNIFICANT CHANGE UP (ref 10.3–14.5)
SODIUM SERPL-SCNC: 137 MMOL/L — SIGNIFICANT CHANGE UP (ref 135–145)
WBC # BLD: 5.77 K/UL — SIGNIFICANT CHANGE UP (ref 3.8–10.5)
WBC # FLD AUTO: 5.77 K/UL — SIGNIFICANT CHANGE UP (ref 3.8–10.5)

## 2024-11-02 PROCEDURE — 99233 SBSQ HOSP IP/OBS HIGH 50: CPT | Mod: GC

## 2024-11-02 PROCEDURE — 90935 HEMODIALYSIS ONE EVALUATION: CPT | Mod: GC

## 2024-11-02 RX ORDER — TUBERCULIN,PURIF.PROT.DERIV. 5 T/0.1 ML
5 VIAL (ML) INTRADERMAL ONCE
Refills: 0 | Status: COMPLETED | OUTPATIENT
Start: 2024-11-02 | End: 2024-11-02

## 2024-11-02 RX ORDER — MELATONIN 5 MG
3 TABLET ORAL AT BEDTIME
Refills: 0 | Status: DISCONTINUED | OUTPATIENT
Start: 2024-11-02 | End: 2024-11-03

## 2024-11-02 RX ORDER — INSULIN LISPRO 100/ML
9 VIAL (ML) SUBCUTANEOUS
Refills: 0 | Status: DISCONTINUED | OUTPATIENT
Start: 2024-11-02 | End: 2024-11-04

## 2024-11-02 RX ORDER — CHLORHEXIDINE GLUCONATE 40 MG/ML
1 SOLUTION TOPICAL DAILY
Refills: 0 | Status: DISCONTINUED | OUTPATIENT
Start: 2024-11-02 | End: 2024-11-05

## 2024-11-02 RX ORDER — HEPARIN SODIUM 10000 [USP'U]/ML
5000 INJECTION INTRAVENOUS; SUBCUTANEOUS EVERY 8 HOURS
Refills: 0 | Status: DISCONTINUED | OUTPATIENT
Start: 2024-11-02 | End: 2024-11-05

## 2024-11-02 RX ADMIN — Medication 650 MILLIGRAM(S): at 13:52

## 2024-11-02 RX ADMIN — Medication 25 UNIT(S): at 22:12

## 2024-11-02 RX ADMIN — GABAPENTIN 100 MILLIGRAM(S): 300 CAPSULE ORAL at 22:15

## 2024-11-02 RX ADMIN — Medication 2: at 18:19

## 2024-11-02 RX ADMIN — HEPARIN SODIUM 5000 UNIT(S): 10000 INJECTION INTRAVENOUS; SUBCUTANEOUS at 18:03

## 2024-11-02 RX ADMIN — Medication 5 UNIT(S): at 09:12

## 2024-11-02 RX ADMIN — Medication 650 MILLIGRAM(S): at 05:44

## 2024-11-02 RX ADMIN — Medication 650 MILLIGRAM(S): at 14:22

## 2024-11-02 RX ADMIN — GABAPENTIN 100 MILLIGRAM(S): 300 CAPSULE ORAL at 09:01

## 2024-11-02 RX ADMIN — CHLORHEXIDINE GLUCONATE 1 APPLICATION(S): 40 SOLUTION TOPICAL at 22:28

## 2024-11-02 RX ADMIN — HEPARIN SODIUM 5000 UNIT(S): 10000 INJECTION INTRAVENOUS; SUBCUTANEOUS at 09:12

## 2024-11-02 RX ADMIN — Medication 60 MILLIGRAM(S): at 18:03

## 2024-11-02 RX ADMIN — Medication 10: at 09:00

## 2024-11-02 RX ADMIN — Medication 2: at 22:21

## 2024-11-02 RX ADMIN — HEPARIN SODIUM 5000 UNIT(S): 10000 INJECTION INTRAVENOUS; SUBCUTANEOUS at 22:15

## 2024-11-02 RX ADMIN — Medication 650 MILLIGRAM(S): at 22:15

## 2024-11-02 RX ADMIN — Medication 10 MILLIGRAM(S): at 22:15

## 2024-11-02 RX ADMIN — Medication 50 MICROGRAM(S): at 05:44

## 2024-11-02 RX ADMIN — Medication 0.4 MILLIGRAM(S): at 22:15

## 2024-11-02 NOTE — PROGRESS NOTE ADULT - PROBLEM SELECTOR PLAN 1
Pt with CKD 3 (baseline Cr 2.3-2.5), currently with Cr 6.47. Etiology likely pre-renal in setting poor PO intake. Patient will likely need HD as likely not to recover kidney function.   UA shows bacteria and LE; however pt remains asymptomatic. no indication for treatment.    - Nephro consulted, f/u recs  - S/p TDC placement  -2/3 HD sessions today   [] F/u PPD  [] F/u Quantiferon  - Hepatitis panel neg

## 2024-11-02 NOTE — PROGRESS NOTE ADULT - SUBJECTIVE AND OBJECTIVE BOX
Seen at bedside. Tolerated first HD treatment yesterday. Planned for second HD today.    Allergies:  No Known Allergies      Hospital Medications:   MEDICATIONS  (STANDING):  atorvastatin 10 milliGRAM(s) Oral at bedtime  chlorhexidine 2% Cloths 1 Application(s) Topical daily  dextrose 5%. 1000 milliLiter(s) (50 mL/Hr) IV Continuous <Continuous>  dextrose 5%. 1000 milliLiter(s) (100 mL/Hr) IV Continuous <Continuous>  dextrose 50% Injectable 25 Gram(s) IV Push once  dextrose 50% Injectable 12.5 Gram(s) IV Push once  dextrose 50% Injectable 25 Gram(s) IV Push once  gabapentin 100 milliGRAM(s) Oral every 12 hours  glucagon  Injectable 1 milliGRAM(s) IntraMuscular once  heparin   Injectable 5000 Unit(s) SubCutaneous every 8 hours  influenza   Vaccine 0.5 milliLiter(s) IntraMuscular once  insulin glargine Injectable (LANTUS) 25 Unit(s) SubCutaneous at bedtime  insulin lispro (ADMELOG) corrective regimen sliding scale   SubCutaneous Before meals and at bedtime  insulin lispro Injectable (ADMELOG) 9 Unit(s) SubCutaneous three times a day before meals  levothyroxine 50 MICROGram(s) Oral daily  NIFEdipine XL 60 milliGRAM(s) Oral every 24 hours  sodium bicarbonate 650 milliGRAM(s) Oral every 8 hours  tamsulosin 0.4 milliGRAM(s) Oral at bedtime    MEDICATIONS  (PRN):  acetaminophen     Tablet .. 650 milliGRAM(s) Oral every 6 hours PRN Mild Pain (1 - 3)  dextrose Oral Gel 15 Gram(s) Oral once PRN Blood Glucose LESS THAN 70 milliGRAM(s)/deciliter      REVIEW OF SYSTEMS:  As above, otherwise negative    VITALS:  T(F): 98 (11-02-24 @ 12:45), Max: 98.7 (11-02-24 @ 09:35)  HR: 68 (11-02-24 @ 12:45)  BP: 172/82 (11-02-24 @ 12:45)  RR: 18 (11-02-24 @ 12:45)  SpO2: 98% (11-02-24 @ 12:45)  Wt(kg): --    10-31 @ 07:01  -  11-01 @ 07:00  --------------------------------------------------------  IN: 0 mL / OUT: 450 mL / NET: -450 mL    11-01 @ 07:01  -  11-02 @ 07:00  --------------------------------------------------------  IN: 0 mL / OUT: 0 mL / NET: 0 mL    11-02 @ 08:01  -  11-02 @ 20:16  --------------------------------------------------------  IN: 0 mL / OUT: 1000 mL / NET: -1000 mL          PHYSICAL EXAM:  Constitutional: NAD, lying in bed  HEENT: NCAT, EOMI  Resp: CTAB, no respiratory distress  Cardio: Regular rate, +S1/S2  GI: Abd soft, non-tender, non-distended. Ostomy noted.  Ext: No LE edema, no cyanosis/clubbing  Skin: No rash on exposed skin, warm and well perfused  Neuro: Awake, alert, interactive  Access: [facility] c/d/i    LABS:  11-02    137  |  102  |  36[H]  ----------------------------<  243[H]  4.3   |  24  |  4.20[H]    Ca    8.6      02 Nov 2024 08:50  Phos  3.5     11-02  Mg     1.9     11-02    TPro  7.6  /  Alb  3.8  /  TBili  0.2  /  DBili      /  AST  15  /  ALT  13  /  AlkPhos  168[H]  11-01                          10.1   5.77  )-----------( 166      ( 02 Nov 2024 08:50 )             29.3         Urine Studies:  Creatinine Trend: 4.20<--, 5.57<--, 5.67<--, 5.75<--, 5.72<--, 5.62<--  Urinalysis Basic - ( 02 Nov 2024 08:50 )    Color:  / Appearance:  / SG:  / pH:   Gluc: 243 mg/dL / Ketone:   / Bili:  / Urobili:    Blood:  / Protein:  / Nitrite:    Leuk Esterase:  / RBC:  / WBC    Sq Epi:  / Non Sq Epi:  / Bacteria:       Sodium, Random Urine: <20 mmol/L (10-30 @ 04:49)  Creatinine, Random Urine: 63 mg/dL (10-30 @ 04:49)  Protein/Creatinine Ratio Calculation: 0.6 Ratio (10-30 @ 04:49)  Osmolality, Random Urine: 225 mosm/kg (10-30 @ 04:49)      RADIOLOGY & ADDITIONAL STUDIES:  Reviewed

## 2024-11-02 NOTE — PROGRESS NOTE ADULT - ATTENDING COMMENTS
I: Last dialyzed yesterday.   Seen on dialysis.   A: ESRD.   P: Second dialysis today. Outpatient HD planning.

## 2024-11-02 NOTE — PROGRESS NOTE ADULT - PROBLEM SELECTOR PLAN 6
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 On 10/31, Pt HR in 40s o/n after trazodone given for sleep. Trazodone has cholinergic effects that can lead to bradycardia.  Pt HR improved to 60s during the day.     - HOLD trazodone, can consider melatonin for sleep instead or lower dose of trazodone (25mg or 50mg) if complains of insomnia

## 2024-11-02 NOTE — PROGRESS NOTE ADULT - PROBLEM SELECTOR PLAN 2
IMPROVED. Etiology likely uremia 2/2 MEG. Pt with pH 7.19 on VBG. AG 14, improved to 12 with improvement in BUN. Repeat VBG and BMP wnl  - C/W HD   - F/u nephro recs  - C/w home bicarb tabs TID

## 2024-11-02 NOTE — PROGRESS NOTE ADULT - SUBJECTIVE AND OBJECTIVE BOX
ON: [place]     SUBJECTIVE / INTERVAL HPI: Seen after HD, reports fatigue, confusion much improved after HD. Discussed plan to continue with 3 days of sequential HD, followed by outpatient HD. Denies fevers, chills, HA, chest pain, SOB, n/v/d, abd pain, dysuria. ROS otherwise negative.      PHYSICAL EXAM:    General: NAD  HEENT: NC/AT; PERRL, anicteric sclera; MMM  Neck: supple  Cardiovascular: +S1/S2, RRR  Respiratory: CTA B/L; no W/R/R  Gastrointestinal: soft, NT/ND; +BSx4; ostomy bag in place draining brown stool, no signs of drainage  Extremities: WWP; no edema, clubbing or cyanosis  Vascular: 2+ radial, DP/PT pulses B/L  Neurological: AAOx3; no focal deficits  Psychiatric: pleasant mood and affect  Dermatologic: no appreciable wounds or damage to the skin      Medications  acetaminophen     Tablet .. 650 milliGRAM(s) Oral every 6 hours PRN  atorvastatin 10 milliGRAM(s) Oral at bedtime  chlorhexidine 2% Cloths 1 Application(s) Topical daily  dextrose 5%. 1000 milliLiter(s) IV Continuous <Continuous>  dextrose 5%. 1000 milliLiter(s) IV Continuous <Continuous>  dextrose 50% Injectable 25 Gram(s) IV Push once  dextrose 50% Injectable 12.5 Gram(s) IV Push once  dextrose 50% Injectable 25 Gram(s) IV Push once  dextrose Oral Gel 15 Gram(s) Oral once PRN  gabapentin 100 milliGRAM(s) Oral every 12 hours  glucagon  Injectable 1 milliGRAM(s) IntraMuscular once  heparin   Injectable 5000 Unit(s) SubCutaneous every 8 hours  influenza   Vaccine 0.5 milliLiter(s) IntraMuscular once  insulin glargine Injectable (LANTUS) 25 Unit(s) SubCutaneous at bedtime  insulin lispro (ADMELOG) corrective regimen sliding scale   SubCutaneous Before meals and at bedtime  insulin lispro Injectable (ADMELOG) 9 Unit(s) SubCutaneous three times a day before meals  levothyroxine 50 MICROGram(s) Oral daily  NIFEdipine XL 60 milliGRAM(s) Oral every 24 hours  sodium bicarbonate 650 milliGRAM(s) Oral every 8 hours  tamsulosin 0.4 milliGRAM(s) Oral at bedtime      Allergies  No Known Allergies      Vitals  T(C): 36.7 (11-02-24 @ 12:45), Max: 37.1 (11-02-24 @ 09:35)  HR: 68 (11-02-24 @ 12:45) (61 - 82)  BP: 172/82 (11-02-24 @ 12:45) (139/73 - 180/73)  RR: 18 (11-02-24 @ 12:45) (17 - 18)  SpO2: 98% (11-02-24 @ 12:45) (97% - 100%)    Intake/Output    11-01-24 @ 07:01  -  11-02-24 @ 07:00  --------------------------------------------------------  IN: 0 mL / OUT: 0 mL / NET: 0 mL    11-02-24 @ 08:01  -  11-02-24 @ 18:06  --------------------------------------------------------  IN: 0 mL / OUT: 1000 mL / NET: -1000 mL      CAPILLARY BLOOD GLUCOSE      POCT Blood Glucose.: 182 mg/dL (02 Nov 2024 17:46)  POCT Blood Glucose.: 104 mg/dL (02 Nov 2024 13:03)  POCT Blood Glucose.: 355 mg/dL (02 Nov 2024 08:58)  POCT Blood Glucose.: 298 mg/dL (01 Nov 2024 22:15)        Labs                          10.1   5.77  )-----------( 166      ( 02 Nov 2024 08:50 )             29.3       11-02    137  |  102  |  36[H]  ----------------------------<  243[H]  4.3   |  24  |  4.20[H]    Ca    8.6      02 Nov 2024 08:50  Phos  3.5     11-02  Mg     1.9     11-02    TPro  7.6  /  Alb  3.8  /  TBili  0.2  /  DBili  x   /  AST  15  /  ALT  13  /  AlkPhos  168[H]  11-01      PT/INR - ( 01 Nov 2024 05:30 )   PT: 11.1 sec;   INR: 0.95          PTT - ( 01 Nov 2024 05:30 )  PTT:27.2 sec  RADIOLOGY & ADDITIONAL TESTS: Reviewed.

## 2024-11-02 NOTE — PROGRESS NOTE ADULT - ASSESSMENT
62 YOM PMHx IDDMII, hypothyroidism, hypertension, s/p colostomy, previous issues with electrolyte disturbances inc hyponatremia, CKD III baseline sCr 2.1-2.5, presents to ED from [facility] with confusion, ostomy bag break, unable to provide history.  Found to have UTI, hyperkalemia.  Baseline renal function seems to be around sCr of 2.1-2.5.  Nephrology consulted for MEG on CKD.  Hyponatremia resolved. Patient asymptomatic. Planned for tunneled catheter insertion today to start hemodialysis     #MEG on CKD, suspect progression now to ESRD  Patient noted to have MEG on admission with  Cr 6.47 over baseline 2.5. Remains elevated today at 5.57. Enlarged Kidneys seen on recent US. Likely secondary to prolonged uncontrolled diabetes mellitus.   - Concern for progression to ESRD. Patient presented with collapse after leg weakness and shaking. Potassium on admission 5.5, however on multiple prior admissions to [facility] and [facility] he has had potassium ranges as high as 7.0 & 8.0. Cr has remained elevated at close to 3.0 on all prior admission in the last year, likely close to his actual baseline.  - S/p tunneled HD line placement 11/1 and HD initiated     Plan  - Second HD treatment today. Anticipate third treatment on Monday.  - Social work consult for outpatient HD unit placement  - Obtain HBV/HCV serologies, quantiferon  - Strict I&O, daily weights  - renal diet, 1.5L fluid restriction  - EDW to be determined  - Stop sodium bicarbonate tabs now that HD has been started

## 2024-11-02 NOTE — PROGRESS NOTE ADULT - PROBLEM SELECTOR PLAN 4
Patient with T2DM, on basal bolus insulin regimen 20/7 at home. + at time of admission, A1c 9.2. Pt last picked up insulin from pharmacy in jan 2024. At that time, pt dosing was 24 units lantus in the morning. Reports that he is not currently taking Insulin at home. Has chronic peripheral neuropathy and takes Gabapentin 300mg BID at home.  - Increase Lantus 25u qhs  - Lispro 9U TID  - mISS  - carb consistent diet  - Gabapentin 100mg to BID

## 2024-11-03 LAB
ANION GAP SERPL CALC-SCNC: 11 MMOL/L — SIGNIFICANT CHANGE UP (ref 5–17)
BASOPHILS # BLD AUTO: 0.03 K/UL — SIGNIFICANT CHANGE UP (ref 0–0.2)
BASOPHILS NFR BLD AUTO: 0.5 % — SIGNIFICANT CHANGE UP (ref 0–2)
BUN SERPL-MCNC: 26 MG/DL — HIGH (ref 7–23)
CALCIUM SERPL-MCNC: 8.4 MG/DL — SIGNIFICANT CHANGE UP (ref 8.4–10.5)
CHLORIDE SERPL-SCNC: 102 MMOL/L — SIGNIFICANT CHANGE UP (ref 96–108)
CO2 SERPL-SCNC: 24 MMOL/L — SIGNIFICANT CHANGE UP (ref 22–31)
CREAT SERPL-MCNC: 3.77 MG/DL — HIGH (ref 0.5–1.3)
EGFR: 17 ML/MIN/1.73M2 — LOW
EOSINOPHIL # BLD AUTO: 0.18 K/UL — SIGNIFICANT CHANGE UP (ref 0–0.5)
EOSINOPHIL NFR BLD AUTO: 3.2 % — SIGNIFICANT CHANGE UP (ref 0–6)
GLUCOSE BLDC GLUCOMTR-MCNC: 173 MG/DL — HIGH (ref 70–99)
GLUCOSE BLDC GLUCOMTR-MCNC: 195 MG/DL — HIGH (ref 70–99)
GLUCOSE BLDC GLUCOMTR-MCNC: 276 MG/DL — HIGH (ref 70–99)
GLUCOSE BLDC GLUCOMTR-MCNC: 326 MG/DL — HIGH (ref 70–99)
GLUCOSE SERPL-MCNC: 166 MG/DL — HIGH (ref 70–99)
HCT VFR BLD CALC: 27.3 % — LOW (ref 39–50)
HGB BLD-MCNC: 9.2 G/DL — LOW (ref 13–17)
IMM GRANULOCYTES NFR BLD AUTO: 1.4 % — HIGH (ref 0–0.9)
LYMPHOCYTES # BLD AUTO: 1.44 K/UL — SIGNIFICANT CHANGE UP (ref 1–3.3)
LYMPHOCYTES # BLD AUTO: 25.9 % — SIGNIFICANT CHANGE UP (ref 13–44)
MAGNESIUM SERPL-MCNC: 1.8 MG/DL — SIGNIFICANT CHANGE UP (ref 1.6–2.6)
MCHC RBC-ENTMCNC: 31 PG — SIGNIFICANT CHANGE UP (ref 27–34)
MCHC RBC-ENTMCNC: 33.7 G/DL — SIGNIFICANT CHANGE UP (ref 32–36)
MCV RBC AUTO: 91.9 FL — SIGNIFICANT CHANGE UP (ref 80–100)
MONOCYTES # BLD AUTO: 0.74 K/UL — SIGNIFICANT CHANGE UP (ref 0–0.9)
MONOCYTES NFR BLD AUTO: 13.3 % — SIGNIFICANT CHANGE UP (ref 2–14)
NEUTROPHILS # BLD AUTO: 3.08 K/UL — SIGNIFICANT CHANGE UP (ref 1.8–7.4)
NEUTROPHILS NFR BLD AUTO: 55.7 % — SIGNIFICANT CHANGE UP (ref 43–77)
NRBC # BLD: 0 /100 WBCS — SIGNIFICANT CHANGE UP (ref 0–0)
PHOSPHATE SERPL-MCNC: 3 MG/DL — SIGNIFICANT CHANGE UP (ref 2.5–4.5)
PLATELET # BLD AUTO: 150 K/UL — SIGNIFICANT CHANGE UP (ref 150–400)
POTASSIUM SERPL-MCNC: 3.8 MMOL/L — SIGNIFICANT CHANGE UP (ref 3.5–5.3)
POTASSIUM SERPL-SCNC: 3.8 MMOL/L — SIGNIFICANT CHANGE UP (ref 3.5–5.3)
RBC # BLD: 2.97 M/UL — LOW (ref 4.2–5.8)
RBC # FLD: 12.8 % — SIGNIFICANT CHANGE UP (ref 10.3–14.5)
SODIUM SERPL-SCNC: 137 MMOL/L — SIGNIFICANT CHANGE UP (ref 135–145)
WBC # BLD: 5.55 K/UL — SIGNIFICANT CHANGE UP (ref 3.8–10.5)
WBC # FLD AUTO: 5.55 K/UL — SIGNIFICANT CHANGE UP (ref 3.8–10.5)

## 2024-11-03 PROCEDURE — 99233 SBSQ HOSP IP/OBS HIGH 50: CPT | Mod: GC

## 2024-11-03 RX ORDER — MELATONIN 5 MG
5 TABLET ORAL AT BEDTIME
Refills: 0 | Status: DISCONTINUED | OUTPATIENT
Start: 2024-11-03 | End: 2024-11-05

## 2024-11-03 RX ORDER — GABAPENTIN 300 MG/1
100 CAPSULE ORAL AT BEDTIME
Refills: 0 | Status: DISCONTINUED | OUTPATIENT
Start: 2024-11-03 | End: 2024-11-05

## 2024-11-03 RX ADMIN — Medication 2: at 09:48

## 2024-11-03 RX ADMIN — GABAPENTIN 100 MILLIGRAM(S): 300 CAPSULE ORAL at 10:41

## 2024-11-03 RX ADMIN — Medication 8: at 21:52

## 2024-11-03 RX ADMIN — Medication 9 UNIT(S): at 18:16

## 2024-11-03 RX ADMIN — Medication 0.4 MILLIGRAM(S): at 21:52

## 2024-11-03 RX ADMIN — Medication 5 MILLIGRAM(S): at 21:51

## 2024-11-03 RX ADMIN — Medication 650 MILLIGRAM(S): at 11:11

## 2024-11-03 RX ADMIN — GABAPENTIN 100 MILLIGRAM(S): 300 CAPSULE ORAL at 21:52

## 2024-11-03 RX ADMIN — Medication 50 MICROGRAM(S): at 06:03

## 2024-11-03 RX ADMIN — Medication 2: at 13:21

## 2024-11-03 RX ADMIN — Medication 3 MILLIGRAM(S): at 00:42

## 2024-11-03 RX ADMIN — HEPARIN SODIUM 5000 UNIT(S): 10000 INJECTION INTRAVENOUS; SUBCUTANEOUS at 21:52

## 2024-11-03 RX ADMIN — HEPARIN SODIUM 5000 UNIT(S): 10000 INJECTION INTRAVENOUS; SUBCUTANEOUS at 13:24

## 2024-11-03 RX ADMIN — Medication 25 UNIT(S): at 21:52

## 2024-11-03 RX ADMIN — Medication 650 MILLIGRAM(S): at 06:03

## 2024-11-03 RX ADMIN — Medication 60 MILLIGRAM(S): at 18:21

## 2024-11-03 RX ADMIN — Medication 9 UNIT(S): at 09:49

## 2024-11-03 RX ADMIN — Medication 9 UNIT(S): at 13:22

## 2024-11-03 RX ADMIN — CHLORHEXIDINE GLUCONATE 1 APPLICATION(S): 40 SOLUTION TOPICAL at 13:25

## 2024-11-03 RX ADMIN — HEPARIN SODIUM 5000 UNIT(S): 10000 INJECTION INTRAVENOUS; SUBCUTANEOUS at 06:03

## 2024-11-03 RX ADMIN — Medication 10 MILLIGRAM(S): at 21:52

## 2024-11-03 RX ADMIN — Medication 6: at 18:16

## 2024-11-03 RX ADMIN — Medication 650 MILLIGRAM(S): at 10:41

## 2024-11-03 NOTE — PROGRESS NOTE ADULT - PROBLEM SELECTOR PLAN 3
RESOLVED. Pt presented to ED with confusion. Utox negative, CT head negative. Patient markedly hypovolemic on physical exam in ED. Etiology likely 2/2 hypovolemia. Patient mental status resolved, now AOx3 s/p 2L NS.  - Continue to monitor for acute changes in mental status RESOLVED. Pt presented to ED with confusion. Utox negative, CT head negative. Patient markedly hypovolemic on physical exam in ED. Etiology likely 2/2 hypovolemia. Patient mental status resolved, now AOx3 s/p 2L NS.    - Continue to monitor for acute changes in mental status

## 2024-11-03 NOTE — PROGRESS NOTE ADULT - ATTENDING COMMENTS
65 yo M with PMHx HTN, HLD, DM, CKD, hypothyroidism, hx toxic megacolon (s/p colostomy), etOH use BIBEMS from home 10/29 with 1-2d hx of dizziness and blurry vision, admitted to [facility]/telemetry for MEG on CKD c/b NAGMA requiring bicarb gtt, s/p stepdown to [facility]. Admitted for initiation of HD.  Reports feeling weak today especially when standing up, -150s  [/] F/u PPD placed on 11/2 on 11/4   [ ] F/u orthostatics, if negative then reduce GBP from BID to QD   -Nephrology following, appreciate recs re: HD   Dispo: Shelter, HD chair 63 yo M with PMHx HTN, HLD, DM, CKD, hypothyroidism, hx toxic megacolon (s/p colostomy), etOH use BIBEMS from home 10/29 with 1-2d hx of dizziness and blurry vision, admitted to [facility]/telemetry for MEG on CKD c/b NAGMA requiring bicarb gtt, s/p stepdown to [facility]. Admitted for initiation of HD.  Reports feeling weak today especially when standing up, -150s.  -BG control much improved today   [/] F/u PPD placed on 11/2 on 11/4   [ ] F/u orthostatics, if negative then reduce GBP from BID to QD   -Nephrology following, appreciate recs re: HD   Dispo: Shelter, HD chair

## 2024-11-03 NOTE — PROGRESS NOTE ADULT - PROBLEM SELECTOR PLAN 2
IMPROVED. Etiology likely uremia 2/2 MEG. Pt with pH 7.19 on VBG. AG 14, improved to 12 with improvement in BUN. Repeat VBG and BMP wnl  - C/W HD   - F/u nephro recs  - C/w home bicarb tabs TID IMPROVED. Etiology likely uremia 2/2 MEG. Pt with pH 7.19 on VBG. AG 14, improved to 12 with improvement in BUN. Repeat VBG and BMP wnl    - C/w HD per renal  - F/u nephro recs  - D/c home sodium bicarb tabs per renal now that pt has started HD

## 2024-11-03 NOTE — PROGRESS NOTE ADULT - PROBLEM SELECTOR PLAN 1
Pt with CKD 3 (baseline Cr 2.3-2.5), currently with Cr 6.47. Etiology likely pre-renal in setting poor PO intake. Patient will likely need HD as likely not to recover kidney function.   UA shows bacteria and LE; however pt remains asymptomatic. no indication for treatment.    - Nephro consulted, f/u recs  - S/p TDC placement  -2/3 HD sessions today   [] F/u PPD  [] F/u Quantiferon  - Hepatitis panel neg Pt with CKD 3 (baseline Cr 2.3-2.5), currently with Cr 6.47. Etiology likely pre-renal in setting poor PO intake. Patient will likely need HD as likely not to recover kidney function.   UA shows bacteria and LE; however pt remains asymptomatic. no indication for treatment.    - Nephro consulted, f/u recs  - S/p TDC placement  - S/p 2/3 HD sessions --> pt will undergo 3rd session tomorrow  - PPD placed 11/2, f/u in 48-72 hours  - F/u Quantiferon  - Hepatitis panel neg

## 2024-11-03 NOTE — PROGRESS NOTE ADULT - PROBLEM SELECTOR PLAN 4
Patient with T2DM, on basal bolus insulin regimen 20/7 at home. + at time of admission, A1c 9.2. Pt last picked up insulin from pharmacy in jan 2024. At that time, pt dosing was 24 units lantus in the morning. Reports that he is not currently taking Insulin at home. Has chronic peripheral neuropathy and takes Gabapentin 300mg BID at home.  - Increase Lantus 25u qhs  - Lispro 9U TID  - mISS  - carb consistent diet  - Gabapentin 100mg to BID Patient with T2DM, on basal bolus insulin regimen 20/7 at home. + at time of admission, A1c 9.2. Pt last picked up insulin from pharmacy in jan 2024. At that time, pt dosing was 24 units lantus in the morning. Reports that he is not currently taking Insulin at home. Has chronic peripheral neuropathy and takes Gabapentin 300mg BID at home.  BS have been more well-controlled on insulin regimen    - C/w Lantus 25u qhs  - C/w lispro 9U TID  - mISS  - carb consistent diet  - C/w Gabapentin 100mg BID

## 2024-11-03 NOTE — PROGRESS NOTE ADULT - SUBJECTIVE AND OBJECTIVE BOX
OVERNIGHT EVENTS:    SUBJECTIVE / INTERVAL HPI: Patient seen and examined at bedside.       PHYSICAL EXAM:    General: NAD  HEENT: NC/AT; PERRL, anicteric sclera; MMM  Neck: supple  Cardiovascular: +S1/S2, RRR  Respiratory: CTA B/L; no W/R/R  Gastrointestinal: soft, NT/ND; +BSx4  Extremities: WWP; no edema, clubbing or cyanosis  Vascular: 2+ radial, DP/PT pulses B/L  Neurological: AAOx3; no focal deficits  Psychiatric: pleasant mood and affect  Dermatologic: no appreciable wounds or damage to the skin    VITAL SIGNS:  Vital Signs Last 24 Hrs  T(C): 36.8 (03 Nov 2024 05:21), Max: 37.1 (02 Nov 2024 09:35)  T(F): 98.3 (03 Nov 2024 05:21), Max: 98.7 (02 Nov 2024 09:35)  HR: 66 (03 Nov 2024 05:21) (63 - 69)  BP: 107/52 (03 Nov 2024 05:21) (107/52 - 180/73)  BP(mean): --  RR: 17 (03 Nov 2024 05:21) (17 - 18)  SpO2: 96% (03 Nov 2024 05:21) (96% - 98%)    Parameters below as of 03 Nov 2024 05:21  Patient On (Oxygen Delivery Method): room air          MEDICATIONS:  MEDICATIONS  (STANDING):  atorvastatin 10 milliGRAM(s) Oral at bedtime  chlorhexidine 2% Cloths 1 Application(s) Topical daily  dextrose 5%. 1000 milliLiter(s) (100 mL/Hr) IV Continuous <Continuous>  dextrose 5%. 1000 milliLiter(s) (50 mL/Hr) IV Continuous <Continuous>  dextrose 50% Injectable 25 Gram(s) IV Push once  dextrose 50% Injectable 12.5 Gram(s) IV Push once  dextrose 50% Injectable 25 Gram(s) IV Push once  gabapentin 100 milliGRAM(s) Oral every 12 hours  glucagon  Injectable 1 milliGRAM(s) IntraMuscular once  heparin   Injectable 5000 Unit(s) SubCutaneous every 8 hours  influenza   Vaccine 0.5 milliLiter(s) IntraMuscular once  insulin glargine Injectable (LANTUS) 25 Unit(s) SubCutaneous at bedtime  insulin lispro (ADMELOG) corrective regimen sliding scale   SubCutaneous Before meals and at bedtime  insulin lispro Injectable (ADMELOG) 9 Unit(s) SubCutaneous three times a day before meals  levothyroxine 50 MICROGram(s) Oral daily  melatonin 3 milliGRAM(s) Oral at bedtime  NIFEdipine XL 60 milliGRAM(s) Oral every 24 hours  tamsulosin 0.4 milliGRAM(s) Oral at bedtime    MEDICATIONS  (PRN):  acetaminophen     Tablet .. 650 milliGRAM(s) Oral every 6 hours PRN Mild Pain (1 - 3)  dextrose Oral Gel 15 Gram(s) Oral once PRN Blood Glucose LESS THAN 70 milliGRAM(s)/deciliter      ALLERGIES:  Allergies    No Known Allergies    Intolerances        LABS:                        10.1   5.77  )-----------( 166      ( 02 Nov 2024 08:50 )             29.3     11-02    137  |  102  |  36[H]  ----------------------------<  243[H]  4.3   |  24  |  4.20[H]    Ca    8.6      02 Nov 2024 08:50  Phos  3.5     11-02  Mg     1.9     11-02        Urinalysis Basic - ( 02 Nov 2024 08:50 )    Color: x / Appearance: x / SG: x / pH: x  Gluc: 243 mg/dL / Ketone: x  / Bili: x / Urobili: x   Blood: x / Protein: x / Nitrite: x   Leuk Esterase: x / RBC: x / WBC x   Sq Epi: x / Non Sq Epi: x / Bacteria: x      CAPILLARY BLOOD GLUCOSE      POCT Blood Glucose.: 173 mg/dL (02 Nov 2024 22:03)      RADIOLOGY & ADDITIONAL TESTS: Reviewed. OVERNIGHT EVENTS: Pt given melatonin for insomnia.    SUBJECTIVE / INTERVAL HPI: Patient seen and examined at bedside. Pt resting comfortably. States he feels good this morning. He states he is taking his insulin as directed. He is having good output from his ostomy and making urine. Denies fevers, chills, HA, chest pain, SOB, n/v/d, dizziness, dysuria. ROS otherwise negative.      PHYSICAL EXAM:    General: NAD  HEENT: NC/AT; PERRL, anicteric sclera; MMM  Neck: supple  Cardiovascular: +S1/S2, RRR  Respiratory: CTA B/L; no W/R/R  Gastrointestinal: soft, NT/ND; +BSx4; ostomy bag in place draining brown stool, no signs of leakage  Extremities: WWP; no edema, clubbing or cyanosis  Vascular: 2+ radial, DP/PT pulses B/L  Neurological: AAOx3; no focal deficits  Psychiatric: pleasant mood and affect  Dermatologic: no appreciable wounds or damage to the skin    VITAL SIGNS:  Vital Signs Last 24 Hrs  T(C): 36.8 (03 Nov 2024 05:21), Max: 37.1 (02 Nov 2024 09:35)  T(F): 98.3 (03 Nov 2024 05:21), Max: 98.7 (02 Nov 2024 09:35)  HR: 66 (03 Nov 2024 05:21) (63 - 69)  BP: 107/52 (03 Nov 2024 05:21) (107/52 - 180/73)  BP(mean): --  RR: 17 (03 Nov 2024 05:21) (17 - 18)  SpO2: 96% (03 Nov 2024 05:21) (96% - 98%)    Parameters below as of 03 Nov 2024 05:21  Patient On (Oxygen Delivery Method): room air          MEDICATIONS:  MEDICATIONS  (STANDING):  atorvastatin 10 milliGRAM(s) Oral at bedtime  chlorhexidine 2% Cloths 1 Application(s) Topical daily  dextrose 5%. 1000 milliLiter(s) (100 mL/Hr) IV Continuous <Continuous>  dextrose 5%. 1000 milliLiter(s) (50 mL/Hr) IV Continuous <Continuous>  dextrose 50% Injectable 25 Gram(s) IV Push once  dextrose 50% Injectable 12.5 Gram(s) IV Push once  dextrose 50% Injectable 25 Gram(s) IV Push once  gabapentin 100 milliGRAM(s) Oral every 12 hours  glucagon  Injectable 1 milliGRAM(s) IntraMuscular once  heparin   Injectable 5000 Unit(s) SubCutaneous every 8 hours  influenza   Vaccine 0.5 milliLiter(s) IntraMuscular once  insulin glargine Injectable (LANTUS) 25 Unit(s) SubCutaneous at bedtime  insulin lispro (ADMELOG) corrective regimen sliding scale   SubCutaneous Before meals and at bedtime  insulin lispro Injectable (ADMELOG) 9 Unit(s) SubCutaneous three times a day before meals  levothyroxine 50 MICROGram(s) Oral daily  melatonin 3 milliGRAM(s) Oral at bedtime  NIFEdipine XL 60 milliGRAM(s) Oral every 24 hours  tamsulosin 0.4 milliGRAM(s) Oral at bedtime    MEDICATIONS  (PRN):  acetaminophen     Tablet .. 650 milliGRAM(s) Oral every 6 hours PRN Mild Pain (1 - 3)  dextrose Oral Gel 15 Gram(s) Oral once PRN Blood Glucose LESS THAN 70 milliGRAM(s)/deciliter      ALLERGIES:  Allergies    No Known Allergies    Intolerances        LABS:                        10.1   5.77  )-----------( 166      ( 02 Nov 2024 08:50 )             29.3     11-02    137  |  102  |  36[H]  ----------------------------<  243[H]  4.3   |  24  |  4.20[H]    Ca    8.6      02 Nov 2024 08:50  Phos  3.5     11-02  Mg     1.9     11-02        Urinalysis Basic - ( 02 Nov 2024 08:50 )    Color: x / Appearance: x / SG: x / pH: x  Gluc: 243 mg/dL / Ketone: x  / Bili: x / Urobili: x   Blood: x / Protein: x / Nitrite: x   Leuk Esterase: x / RBC: x / WBC x   Sq Epi: x / Non Sq Epi: x / Bacteria: x      CAPILLARY BLOOD GLUCOSE      POCT Blood Glucose.: 173 mg/dL (02 Nov 2024 22:03)      RADIOLOGY & ADDITIONAL TESTS: Reviewed.

## 2024-11-04 ENCOUNTER — TRANSCRIPTION ENCOUNTER (OUTPATIENT)
Age: 64
End: 2024-11-04

## 2024-11-04 LAB
% ALBUMIN: 50.3 % — SIGNIFICANT CHANGE UP
% ALPHA 1: 6.1 % — SIGNIFICANT CHANGE UP
% ALPHA 2: 13.1 % — SIGNIFICANT CHANGE UP
% BETA: 13 % — SIGNIFICANT CHANGE UP
% GAMMA: 17.5 % — SIGNIFICANT CHANGE UP
ALBUMIN SERPL ELPH-MCNC: 3.1 G/DL — LOW (ref 3.6–5.5)
ALBUMIN/GLOB SERPL ELPH: 1 RATIO — SIGNIFICANT CHANGE UP
ALPHA1 GLOB SERPL ELPH-MCNC: 0.4 G/DL — SIGNIFICANT CHANGE UP (ref 0.1–0.4)
ALPHA2 GLOB SERPL ELPH-MCNC: 0.8 G/DL — SIGNIFICANT CHANGE UP (ref 0.5–1)
ANION GAP SERPL CALC-SCNC: 14 MMOL/L — SIGNIFICANT CHANGE UP (ref 5–17)
B-GLOBULIN SERPL ELPH-MCNC: 0.8 G/DL — SIGNIFICANT CHANGE UP (ref 0.5–1)
BUN SERPL-MCNC: 37 MG/DL — HIGH (ref 7–23)
CALCIUM SERPL-MCNC: 8.3 MG/DL — LOW (ref 8.4–10.5)
CHLORIDE SERPL-SCNC: 102 MMOL/L — SIGNIFICANT CHANGE UP (ref 96–108)
CO2 SERPL-SCNC: 21 MMOL/L — LOW (ref 22–31)
CREAT SERPL-MCNC: 4.63 MG/DL — HIGH (ref 0.5–1.3)
EGFR: 13 ML/MIN/1.73M2 — LOW
GAMMA GLOBULIN: 1.1 G/DL — SIGNIFICANT CHANGE UP (ref 0.6–1.6)
GLUCOSE BLDC GLUCOMTR-MCNC: 126 MG/DL — HIGH (ref 70–99)
GLUCOSE BLDC GLUCOMTR-MCNC: 134 MG/DL — HIGH (ref 70–99)
GLUCOSE SERPL-MCNC: 124 MG/DL — HIGH (ref 70–99)
HCT VFR BLD CALC: 27.1 % — LOW (ref 39–50)
HGB BLD-MCNC: 8.9 G/DL — LOW (ref 13–17)
INTERPRETATION SERPL IFE-IMP: SIGNIFICANT CHANGE UP
MAGNESIUM SERPL-MCNC: 1.7 MG/DL — SIGNIFICANT CHANGE UP (ref 1.6–2.6)
MCHC RBC-ENTMCNC: 30 PG — SIGNIFICANT CHANGE UP (ref 27–34)
MCHC RBC-ENTMCNC: 32.8 G/DL — SIGNIFICANT CHANGE UP (ref 32–36)
MCV RBC AUTO: 91.2 FL — SIGNIFICANT CHANGE UP (ref 80–100)
NRBC # BLD: 0 /100 WBCS — SIGNIFICANT CHANGE UP (ref 0–0)
PHOSPHATE SERPL-MCNC: 3.6 MG/DL — SIGNIFICANT CHANGE UP (ref 2.5–4.5)
PLATELET # BLD AUTO: 166 K/UL — SIGNIFICANT CHANGE UP (ref 150–400)
POTASSIUM SERPL-MCNC: 4 MMOL/L — SIGNIFICANT CHANGE UP (ref 3.5–5.3)
POTASSIUM SERPL-SCNC: 4 MMOL/L — SIGNIFICANT CHANGE UP (ref 3.5–5.3)
PROT PATTERN SERPL ELPH-IMP: SIGNIFICANT CHANGE UP
PROT SERPL-MCNC: 6.2 G/DL — SIGNIFICANT CHANGE UP (ref 6–8.3)
RBC # BLD: 2.97 M/UL — LOW (ref 4.2–5.8)
RBC # FLD: 12.5 % — SIGNIFICANT CHANGE UP (ref 10.3–14.5)
SODIUM SERPL-SCNC: 137 MMOL/L — SIGNIFICANT CHANGE UP (ref 135–145)
WBC # BLD: 6.7 K/UL — SIGNIFICANT CHANGE UP (ref 3.8–10.5)
WBC # FLD AUTO: 6.7 K/UL — SIGNIFICANT CHANGE UP (ref 3.8–10.5)

## 2024-11-04 PROCEDURE — 99232 SBSQ HOSP IP/OBS MODERATE 35: CPT | Mod: GC

## 2024-11-04 PROCEDURE — 99233 SBSQ HOSP IP/OBS HIGH 50: CPT

## 2024-11-04 RX ORDER — INSULIN LISPRO 100/ML
12 VIAL (ML) SUBCUTANEOUS
Refills: 0 | Status: DISCONTINUED | OUTPATIENT
Start: 2024-11-04 | End: 2024-11-05

## 2024-11-04 RX ADMIN — GABAPENTIN 100 MILLIGRAM(S): 300 CAPSULE ORAL at 16:05

## 2024-11-04 RX ADMIN — Medication 60 MILLIGRAM(S): at 17:25

## 2024-11-04 RX ADMIN — Medication 650 MILLIGRAM(S): at 10:18

## 2024-11-04 RX ADMIN — Medication 650 MILLIGRAM(S): at 09:18

## 2024-11-04 RX ADMIN — Medication 50 MICROGRAM(S): at 06:45

## 2024-11-04 RX ADMIN — HEPARIN SODIUM 5000 UNIT(S): 10000 INJECTION INTRAVENOUS; SUBCUTANEOUS at 14:15

## 2024-11-04 RX ADMIN — Medication 25 UNIT(S): at 22:04

## 2024-11-04 RX ADMIN — Medication 9 UNIT(S): at 13:43

## 2024-11-04 RX ADMIN — Medication 650 MILLIGRAM(S): at 23:13

## 2024-11-04 RX ADMIN — Medication 10 MILLIGRAM(S): at 22:03

## 2024-11-04 RX ADMIN — Medication 8: at 18:21

## 2024-11-04 RX ADMIN — Medication 650 MILLIGRAM(S): at 22:13

## 2024-11-04 RX ADMIN — Medication 5 MILLIGRAM(S): at 22:03

## 2024-11-04 RX ADMIN — Medication 0.4 MILLIGRAM(S): at 22:03

## 2024-11-04 RX ADMIN — HEPARIN SODIUM 5000 UNIT(S): 10000 INJECTION INTRAVENOUS; SUBCUTANEOUS at 06:45

## 2024-11-04 RX ADMIN — HEPARIN SODIUM 5000 UNIT(S): 10000 INJECTION INTRAVENOUS; SUBCUTANEOUS at 22:03

## 2024-11-04 RX ADMIN — Medication 12 UNIT(S): at 18:22

## 2024-11-04 RX ADMIN — Medication 2: at 22:04

## 2024-11-04 RX ADMIN — CHLORHEXIDINE GLUCONATE 1 APPLICATION(S): 40 SOLUTION TOPICAL at 13:44

## 2024-11-04 RX ADMIN — Medication 9 UNIT(S): at 09:17

## 2024-11-04 NOTE — PROGRESS NOTE ADULT - ATTENDING COMMENTS
have reviewed past course and current status.  have spoken with patient regarding tentative post discharge transportation and dialysis.   agree with findings and plans.

## 2024-11-04 NOTE — PROGRESS NOTE ADULT - SUBJECTIVE AND OBJECTIVE BOX
NEPHROLOGY PROGRESS NOTE:    Interval history:  Patient seen and examined on HD.    Vitals:  T(F): 98.4 (24 @ 13:35), Max: 99.1 (24 @ 21:21)  HR: 75 (24 @ 13:35)  BP: 126/66 (24 @ 13:35)  RR: 14 (24 @ 13:35)  SpO2: 98% (24 @ 13:35)  Wt(kg): --     @ 08:01  -   @ 07:00  --------------------------------------------------------  IN: 0 mL / OUT: 1000 mL / NET: -1000 mL     @ 07:01  -   @ 14:00  --------------------------------------------------------  IN: 0 mL / OUT: 2000 mL / NET: -2000 mL        PHYSICAL EXAM:  Constitutional: NAD, lying in bed  HEENT: NCAT, EOMI  Resp: CTAB, no respiratory distress  Cardio: Regular rate, +S1/S2  GI: Abd soft, non-tender, non-distended. Ostomy noted.  Ext: No LE edema, no cyanosis/clubbing  Skin: No rash on exposed skin, warm and well perfused  Neuro: Awake, alert, interactive  Access: R C c/d/i    Pertinent labs & Imagin-04    137  |  102  |  37[H]  ----------------------------<  124[H]  4.0   |  21[L]  |  4.63[H]    Ca    8.3[L]      2024 07:32  Phos  3.6       Mg     1.7                                 8.9    6.70  )-----------( 166      ( 2024 07:32 )             27.1       Urine Studies:  Urinalysis Basic - ( 2024 07:32 )    Color:  / Appearance:  / SG:  / pH:   Gluc: 124 mg/dL / Ketone:   / Bili:  / Urobili:    Blood:  / Protein:  / Nitrite:    Leuk Esterase:  / RBC:  / WBC    Sq Epi:  / Non Sq Epi:  / Bacteria:       Sodium, Random Urine: <20 mmol/L (10-30 @ 04:49)  Creatinine, Random Urine: 63 mg/dL (10-30 @ 04:49)  Protein/Creatinine Ratio Calculation: 0.6 Ratio (10-30 @ 04:49)  Osmolality, Random Urine: 225 mosm/kg (10-30 @ 04:49)        MEDICATIONS  (STANDING):  atorvastatin 10 milliGRAM(s) Oral at bedtime  chlorhexidine 2% Cloths 1 Application(s) Topical daily  dextrose 5%. 1000 milliLiter(s) (100 mL/Hr) IV Continuous <Continuous>  dextrose 5%. 1000 milliLiter(s) (50 mL/Hr) IV Continuous <Continuous>  dextrose 50% Injectable 25 Gram(s) IV Push once  dextrose 50% Injectable 12.5 Gram(s) IV Push once  dextrose 50% Injectable 25 Gram(s) IV Push once  gabapentin 100 milliGRAM(s) Oral at bedtime  glucagon  Injectable 1 milliGRAM(s) IntraMuscular once  heparin   Injectable 5000 Unit(s) SubCutaneous every 8 hours  influenza   Vaccine 0.5 milliLiter(s) IntraMuscular once  insulin glargine Injectable (LANTUS) 25 Unit(s) SubCutaneous at bedtime  insulin lispro (ADMELOG) corrective regimen sliding scale   SubCutaneous Before meals and at bedtime  insulin lispro Injectable (ADMELOG) 12 Unit(s) SubCutaneous three times a day before meals  levothyroxine 50 MICROGram(s) Oral daily  melatonin 5 milliGRAM(s) Oral at bedtime  NIFEdipine XL 60 milliGRAM(s) Oral every 24 hours  tamsulosin 0.4 milliGRAM(s) Oral at bedtime      Hemodialysis Treatment.:     Schedule: Once, Modality: Hemodialysis, Access: Internal Jugular Central Venous Catheter    Dialyzer: Optiflux [id number], Time: 180 Min    Blood Flow: 400 mL/Min , Dialysate Flow: 500 mL/Min, Dialysate Temp: 36.5, Tubinmm (Adult)    Target Fluid Removal: 3 Liters    Dialysate Electrolytes (mEq/L): Potassium 3, Calcium 2.5, Sodium 138, Bicarbonate 35 (24 @ 08:52) [Completed]  Hemodialysis Treatment.:     Schedule: Once, Modality: Hemodialysis, Access: Internal Jugular Central Venous Catheter    Dialyzer: Optiflux [id number], Time: 180 Min    Blood Flow: 300 mL/Min , Dialysate Flow: 500 mL/Min, Dialysate Temp: 36.5, Tubinmm (Adult)    Target Fluid Removal: 0.5 Liters    Dialysate Electrolytes (mEq/L): Potassium 3, Calcium 2.5, Sodium 138, Bicarbonate 35 (24 @ 07:18) [Discontinued]

## 2024-11-04 NOTE — PROGRESS NOTE ADULT - PROBLEM SELECTOR PLAN 2
IMPROVED. Etiology likely uremia 2/2 MEG. Pt with pH 7.19 on VBG. AG 14, improved to 12 with improvement in BUN. Repeat VBG and BMP wnl    - C/w HD per renal  - F/u nephro recs  - D/c home sodium bicarb tabs per renal now that pt has started HD

## 2024-11-04 NOTE — DISCHARGE NOTE PROVIDER - NSDCCPCAREPLAN_GEN_ALL_CORE_FT
PRINCIPAL DISCHARGE DIAGNOSIS  Diagnosis: Acute renal failure (ARF)  Assessment and Plan of Treatment: You were have a history of chronic kidney disease (CKD). Chronic kidney disease is the gradual and/or permanent loss of kidney function. Normally, the kidneys remove fluids, chemicals, and waste from your blood and turn these waste chemicals into urine. As kidney disease worsens, one may lose the ability to remove waste from the body. You were found to have an acute kidney injury, or acute renal failure, on top of your already decreased kidney function. As a result, you were started on hemodialysis. It is very important that you continue to follow your dialysis schedule 3 days per week. Since you have started hemodialysis, you can stop taking your sodium bicarbonate tablets. You should also follow up with the nephrology doctors at your HD center.         SECONDARY DISCHARGE DIAGNOSES  Diagnosis: Type 2 diabetes mellitus with peripheral neuropathy  Assessment and Plan of Treatment: You have a known history of diabetes mellitus prior to your admission. This condition results from blood sugar levels getting too high because your body is more resistant to insulin. Uncontrolled blood sugar levels can lead to kidney and heart damage, pain/numbness/paralysis in your hands and feet, and increased rates of infections.  To manage this, you are on insulin. Please take.... It is important that you continue to take this medication when you are discharged so that you can continue to control your blood sugar levels. Additionally be sure to follow up with your primary care physician, podiatrist, and ophthalmologist on a regular basis.    Diagnosis: Hypothyroidism  Assessment and Plan of Treatment: You were noted to have a history of hypothyroidism. Please continue to take your levothyroxine 50mcg daily. It is important that you take this medication on an empty stomach, preferably 60 minutes before a meal.  Please follow-up with your primary care physician. Should you start to experience symptoms, such as, but not limited to: fatigue/tiredness, feeling cold, poor memory/concentration, constipation, indigestion, dry skin, hair loss, slower heart rate, and weight gain with poor appetite, please let your primary care physician know. This may indicate your levothyroxine dosage may need to be readjusted.    Diagnosis: Hypertension  Assessment and Plan of Treatment: You have a known history of high blood pressure prior to your admission. To manage this you are on a medication called nifedipine 60mg 1x/day. High blood pressure can cause damage to your heart and kidneys and increases your risk of heart attack and stroke. To avoid this, It is important that you continue to take this medication when you are discharged so that you can continue to control your blood pressure. Additionally be sure to follow up with your primary care physician on a regular basis to make sure your blood pressure continues to be well controlled. If you experience symptoms such as but not limited to: sudden onset blurry vision, nausea, vomiting, chest pain, shortness of breath, or palpitations, please go to the nearest emergency room.

## 2024-11-04 NOTE — DISCHARGE NOTE PROVIDER - CARE PROVIDERS DIRECT ADDRESSES
,DirectAddress_Unknown ,DirectAddress_Unknown,[email or web address] ,DirectAddress_Unknown,[email or web address] ,DirectAddress_Unknown,DirectAddress_Unknown

## 2024-11-04 NOTE — PROGRESS NOTE ADULT - ASSESSMENT
61 yo M with PMH of CKD3 (BScr 2.1-2.5), HTN, DM2 presented to ED with confusion, ostomy bag break, unable to provide history.  Found to have UTI, hyperkalemia.  Nephrology following for MEG on CKD vs progressing CKD.       1-MEG on CKD, suspect progression now to ESRD  Patient noted to have MEG on admission with  Cr 6.47 over baseline 2.5. Remains elevated today at 5.57. Enlarged Kidneys seen on recent US. Likely secondary to prolonged uncontrolled diabetes mellitus.   Concern for progression to ESRD.  Patient HD #1 (), HD#2 () via right IJ TDC  -HD#3 as prescribed:  Hemodialysis Treatment.:     Schedule: Once, Modality: Hemodialysis, Access: Internal Jugular Central Venous Catheter    Dialyzer: Optiflux [id number], Time: 180 Min    Blood Flow: 400 mL/Min , Dialysate Flow: 500 mL/Min, Dialysate Temp: 36.5, Tubinmm (Adult)    Target Fluid Removal: 3 Liters    Dialysate Electrolytes (mEq/L): Potassium 3, Calcium 2.5, Sodium 138, Bicarbonate 35    - Social work consult for outpatient HD unit placement  - Strict I&O, daily weights  - renal diet, 1.5L fluid restriction

## 2024-11-04 NOTE — DISCHARGE NOTE PROVIDER - NSDCMRMEDTOKEN_GEN_ALL_CORE_FT
atorvastatin 10 mg oral tablet: 1 tab(s) orally once a day  gabapentin 300 mg oral capsule: 2 cap(s) orally 2 times a day  insulin glargine 100 units/mL subcutaneous solution: 20 unit(s) subcutaneous once a day  insulin lispro 100 units/mL injectable solution: 7 unit(s) injectable 3 times a day  levothyroxine 50 mcg (0.05 mg) oral tablet: 1 tab(s) orally once a day  NIFEdipine 90 mg oral tablet, extended release: 1 tab(s) orally once a day  sodium bicarbonate 650 mg oral tablet: 1 tab(s) orally every 8 hours  tamsulosin 0.4 mg oral capsule: 1 cap(s) orally once a day (at bedtime)  traZODone 100 mg oral tablet: 1 tab(s) orally once a day (at bedtime)   atorvastatin 10 mg oral tablet: 1 tab(s) orally once a day  Basaglar KwikPen 100 units/mL subcutaneous solution: 25 unit(s) subcutaneous once a day (at bedtime)  gabapentin 100 mg oral capsule: 1 cap(s) orally once a day (at bedtime)  Insulin Lispro KwikPen 100 units/mL injectable solution: 12 unit(s) injectable 3 times a day before meals  Insulin pen needles pack of 100: use to inject insulin  levothyroxine 50 mcg (0.05 mg) oral tablet: 1 tab(s) orally once a day  NIFEdipine 60 mg oral tablet, extended release: 1 tab(s) orally every 24 hours  tamsulosin 0.4 mg oral capsule: 1 cap(s) orally once a day (at bedtime)

## 2024-11-04 NOTE — PROGRESS NOTE ADULT - TIME BILLING
chart review, patient interview/exam, review of labs/imaging, management of medical conditions, counseling/educating patient, documentation; excludes teaching and separately reported services
Bedside exam and interview   Reviewed vitals, labs   Discussed patient's plan of care with housestaff   Documentation of encounter    Time documented on encounter excludes teaching and separately reported services
chart review, patient interview/exam, review of labs/imaging, management of medical conditions, counseling/educating patient, documentation; excludes teaching and separately reported services
Chart Review. Independent interpretation of diagnostic tests/labs/imaging. Patient encounter. Resident teaching/review.

## 2024-11-04 NOTE — PROGRESS NOTE ADULT - PROBLEM SELECTOR PLAN 4
Patient with T2DM, on basal bolus insulin regimen 20/7 at home. + at time of admission, A1c 9.2. Pt last picked up insulin from pharmacy in jan 2024. At that time, pt dosing was 24 units lantus in the morning. Reports that he is not currently taking Insulin at home. Has chronic peripheral neuropathy and takes Gabapentin 300mg BID at home.  BS have been more well-controlled on insulin regimen    - C/w Lantus 25u qhs  - C/w lispro 9U TID  - mISS  - carb consistent diet  - C/w Gabapentin 100mg BID Patient with T2DM, on basal bolus insulin regimen 20/7 at home. + at time of admission, A1c 9.2. Pt last picked up insulin from pharmacy in jan 2024. At that time, pt dosing was 24 units lantus in the morning. Reports that he is not currently taking Insulin at home. Has chronic peripheral neuropathy and takes Gabapentin 300mg BID at home.  BS have been more well-controlled on insulin regimen    - C/w Lantus 25u qhs  - Increase premeal from lispro 9U to 12U  - mISS  - carb consistent diet  - Decrease gabapentin to QD

## 2024-11-04 NOTE — DISCHARGE NOTE PROVIDER - HOSPITAL COURSE
#Discharge: do not delete    Patient is __ yo M/F with past medical history of _____ presented with _____, found to have _____ (one liner)    Hospital course (by problem):     Patient was discharged to: (home/[facility]/acute rehab/hospice, etc, and with what services – home health PT/RN? Home O2?)    New medications:   Changes to old medications:  Medications that were stopped:    Items to follow up as outpatient:    Physical exam at the time of discharge:       #Discharge: do not delete    Patient is a 64 M pmhx HTN, hypothyroidism, HLD, T2DM, CKD 3 (baseline Cr 2.3-2.5), colostomy, toxic megacolon, EtOH use disorder (no cirrhosis, confirmed by RUQ US on this admission), presented for weakness and found to have NAGMA 2/2 MEG on CKD likely 2/2 poor PO intake, admitted to [facility] for bicarbonate drip. Per pt, he stopped eating and drinking to help control his diabetes and because he did not want to change his colostomy bag as often for financial reasons. When pt arrived, sodium was was 122 with NAGMA pH 7.19. Pt started on sodium bicarb ggt and 2L NS IV fluids. Pt sodium improved to 131 now and NAGMA and AMS/weakness resolved. Pt also found to have worsening kidney function, GFR<10 now. MEG on CKD likely pre-renal with component of intrinsic kidney failure. Nephrology recommending dialysis for kidney failure. Pt underwent TDC placement and initiated HD with 3 days of HD in a row. Pt with improved weakness, medically stable for d/c. Pt to c/w HD outpatient and f/u with nephrology outpt.    Hospital course (by problem):     #Acute kidney injury superimposed on CKD.   Pt with CKD 3 (baseline Cr 2.3-2.5), currently with Cr 6.47. Etiology likely pre-renal in setting poor PO intake. Patient will likely need HD as likely not to recover kidney function.   UA shows bacteria and LE; however pt remains asymptomatic. no indication for treatment.  - S/p TDC placement  - S/p 3 HD sessions  - PPD neg, quant pending at time of d/c  - Hepatitis panel negative  - Pt should c/w HD schedule per his nephrologist and be sure to f/u    #Normal anion gap metabolic acidosis.   IMPROVED. Etiology likely uremia 2/2 MEG. Pt with pH 7.19 on VBG. AG 14, improved to 12 with improvement in BUN. Repeat VBG and BMP wnl  - C/w HD per renal  - F/u nephro recs  - D/c home sodium bicarb tabs per renal now that pt has started HD.    #Metabolic encephalopathy.   RESOLVED. Pt presented to ED with confusion. Utox negative, CT head negative. Patient markedly hypovolemic on physical exam in ED. Etiology likely 2/2 hypovolemia. Patient mental status resolved, now AOx3 s/p 2L NS.    #Type 2 diabetes mellitus with peripheral neuropathy.   Patient with T2DM, on basal bolus insulin regimen 20/7 at home. + at time of admission, A1c 9.2. Pt last picked up insulin from pharmacy in jan 2024. At that time, pt dosing was 24 units lantus in the morning. Reports that he is not currently taking Insulin at home. Has chronic peripheral neuropathy and takes Gabapentin 300mg BID at home.  BS have been more well-controlled on insulin regimen    - C/w Lantus 25u qhs  - C/w lispro 12U premeal  - carb consistent diet  - C/w gabapentin QD.    #Hypertension.   On home Nifedipine 60mg daily, but states that he is not currently taking it outside of the hospital.  - Continue home meds.    #Bradycardia.   Pt reports Trazodone 100mg QHS as home med. On 10/31, Pt HR in 40s o/n after trazodone given for sleep. Trazodone has cholinergic effects that can lead to bradycardia.  Pt HR improved to 60s during the day.   - HOLD trazodone    #Normocytic anemia.   Per previous hospitalization, seems baseline around 9-10 per outpatient records, currently 11.  - No intervention at this time, continue to monitor    # Hypothyroidism.    On home Levothyroxine 50mcg daily, but states that he is not currently taking it outside of the hospital.  - Continue home meds.    #Hyperlipidemia.   On home Atorvastatin 10mg QHS and reports good adherence.  - Continue home meds.      Patient was discharged to: home    New medications: insulin reg  Changes to old medications: decrease gabapentin to qd  Medications that were stopped: sodium bicarb tabs, trazadone    Items to follow up as outpatient: HD, nephro    Physical exam at the time of discharge:  General: NAD  HEENT: NC/AT; PERRL, anicteric sclera; MMM  Neck: supple  Cardiovascular: +S1/S2, RRR  Respiratory: CTA B/L; no W/R/R  Gastrointestinal: soft, NT/ND; +BSx4; ostomy bag in place draining brown stool, no signs of leakage  Extremities: WWP; no edema, clubbing or cyanosis  Vascular: 2+ radial, DP/PT pulses B/L  Neurological: AAOx3; no focal deficits  Psychiatric: pleasant mood and affect  Dermatologic: no appreciable wounds or damage to the skin     #Discharge: do not delete    Patient is a 64 M pmhx HTN, hypothyroidism, HLD, T2DM, CKD 3 (baseline Cr 2.3-2.5), colostomy, toxic megacolon, EtOH use disorder (no cirrhosis, confirmed by RUQ US on this admission), presented for weakness and found to have NAGMA 2/2 MEG on CKD likely 2/2 poor PO intake, admitted to [facility] for bicarbonate drip. Per pt, he stopped eating and drinking to help control his diabetes and because he did not want to change his colostomy bag as often for financial reasons. When pt arrived, sodium was was 122 with NAGMA pH 7.19. Pt started on sodium bicarb ggt and 2L NS IV fluids. Pt sodium improved to 131 now and NAGMA and AMS/weakness resolved. Pt also found to have worsening kidney function, GFR<10 now. MEG on CKD likely pre-renal with component of intrinsic kidney failure. Nephrology recommending dialysis for kidney failure. Pt underwent TDC placement and initiated HD with 3 days of HD in a row. Pt with improved weakness, medically stable for d/c. Pt to c/w HD outpatient and f/u with nephrology outpt.    Hospital course (by problem):   #Acute kidney injury superimposed on CKD.   Pt with CKD 3 (baseline Cr 2.3-2.5), currently with Cr 6.47. Etiology likely pre-renal in setting poor PO intake. Patient will likely need HD as likely not to recover kidney function.   UA shows bacteria and LE; however pt remains asymptomatic. no indication for treatment.  - S/p TDC placement  - S/p 3 HD sessions  - PPD neg, quant pending at time of d/c  - Hepatitis panel negative  - Pt should c/w HD schedule per his nephrologist and be sure to f/u    #Normal anion gap metabolic acidosis.   IMPROVED. Etiology likely uremia 2/2 MEG. Pt with pH 7.19 on VBG. AG 14, improved to 12 with improvement in BUN. Repeat VBG and BMP wnl  - C/w HD per renal  - F/u nephro recs  - D/c home sodium bicarb tabs per renal now that pt has started HD.    #Metabolic encephalopathy.   RESOLVED. Pt presented to ED with confusion. Utox negative, CT head negative. Patient markedly hypovolemic on physical exam in ED. Etiology likely 2/2 hypovolemia. Patient mental status resolved, now AOx3 s/p 2L NS.    #Type 2 diabetes mellitus with peripheral neuropathy.   Patient with T2DM, on basal bolus insulin regimen 20/7 at home. + at time of admission, A1c 9.2. Pt last picked up insulin from pharmacy in jan 2024. At that time, pt dosing was 24 units lantus in the morning. Reports that he is not currently taking Insulin at home. Has chronic peripheral neuropathy and takes Gabapentin 300mg BID at home.  BS have been more well-controlled on insulin regimen  - C/w Lantus 25u qhs  - C/w lispro 12U pre-meal  - carb consistent diet  - C/w gabapentin QD.    #Hypertension.   On home Nifedipine 60mg daily, but states that he is not currently taking it outside of the hospital.  - Continue home meds.    #Bradycardia.   Pt reports Trazodone 100mg QHS as home med. On 10/31, Pt HR in 40s o/n after trazodone given for sleep. Trazodone has cholinergic effects that can lead to bradycardia.  Pt HR improved to 60s during the day.   - HOLD trazodone    #Normocytic anemia.   Per previous hospitalization, seems baseline around 9-10 per outpatient records, currently 11.  - No intervention at this time, continue to monitor    # Hypothyroidism.    On home Levothyroxine 50mcg daily, but states that he is not currently taking it outside of the hospital.  - Continue home meds.    #Hyperlipidemia.   On home Atorvastatin 10mg QHS and reports good adherence.  - Continue home meds.    Patient was discharged to: home    New medications: insulin reg  Changes to old medications: decrease gabapentin to qd  Medications that were stopped: sodium bicarb tabs, Trazadone    Items to follow up as outpatient: HD, nephro    Physical exam at the time of discharge:  General: NAD  HEENT: NC/AT; PERRL, anicteric sclera; MMM  Neck: supple  Cardiovascular: +S1/S2, RRR  Respiratory: CTA B/L; no W/R/R  Gastrointestinal: soft, NT/ND; +BSx4; ostomy bag in place draining brown stool, no signs of leakage  Extremities: WWP; no edema, clubbing or cyanosis  Vascular: 2+ radial, DP/PT pulses B/L  Neurological: AAOx3; no focal deficits  Psychiatric: pleasant mood and affect  Dermatologic: no appreciable wounds or damage to the skin     #Discharge: do not delete    Patient is a 64 M pmhx HTN, hypothyroidism, HLD, T2DM, CKD 3 (baseline Cr 2.3-2.5), colostomy, toxic megacolon, EtOH use disorder (no cirrhosis, confirmed by RUQ US on this admission), presented for weakness and found to have NAGMA 2/2 MEG on CKD likely 2/2 poor PO intake, admitted to [facility] for bicarbonate drip. Per pt, he stopped eating and drinking to help control his diabetes and because he did not want to change his colostomy bag as often for financial reasons. When pt arrived, sodium was was 122 with NAGMA pH 7.19. Pt started on sodium bicarb ggt and 2L NS IV fluids. Pt sodium improved to 131 now and NAGMA and AMS/weakness resolved. Pt also found to have worsening kidney function, GFR<10 now. MEG on CKD likely pre-renal with component of intrinsic kidney failure. Nephrology recommending dialysis for kidney failure. Pt underwent TDC placement and initiated HD with 3 days of HD in a row. Pt with improved weakness, medically stable for d/c. Pt to c/w HD outpatient and f/u with nephrology outpt.    Hospital course (by problem):  #Acute kidney injury superimposed on CKD.   Pt with CKD 3 (baseline Cr 2.3-2.5), currently with Cr 6.47. Etiology likely pre-renal in setting poor PO intake. Patient will likely need HD as likely not to recover kidney function.   UA shows bacteria and LE; however pt remains asymptomatic. no indication for treatment.  - S/p TDC placement  - S/p 3 HD sessions  - PPD neg, quant pending at time of d/c  - Hepatitis panel negative  - Pt should c/w HD schedule per his nephrologist and be sure to f/u    #Normal anion gap metabolic acidosis.   IMPROVED. Etiology likely uremia 2/2 MEG. Pt with pH 7.19 on VBG. AG 14, improved to 12 with improvement in BUN. Repeat VBG and BMP wnl  - C/w HD per renal  - F/u nephro recs  - D/c home sodium bicarb tabs per renal now that pt has started HD.    #Metabolic encephalopathy.   RESOLVED. Pt presented to ED with confusion. Utox negative, CT head negative. Patient markedly hypovolemic on physical exam in ED. Etiology likely 2/2 hypovolemia. Patient mental status resolved, now AOx3 s/p 2L NS.    #Type 2 diabetes mellitus with peripheral neuropathy.   Patient with T2DM, on basal bolus insulin regimen 20/7 at home. + at time of admission, A1c 9.2. Pt last picked up insulin from pharmacy in jan 2024. At that time, pt dosing was 24 units lantus in the morning. Reports that he is not currently taking Insulin at home. Has chronic peripheral neuropathy and takes Gabapentin 300mg BID at home.  BS have been more well-controlled on insulin regimen  - C/w Lantus 25u qhs  - C/w lispro 12U pre-meal  - carb consistent diet  - C/w gabapentin QD.    #Hypertension.   On home Nifedipine 60mg daily, but states that he is not currently taking it outside of the hospital.  - Continue home meds.    #Bradycardia.   Pt reports Trazodone 100mg QHS as home med. On 10/31, Pt HR in 40s o/n after trazodone given for sleep. Trazodone has cholinergic effects that can lead to bradycardia.  Pt HR improved to 60s during the day.   - HOLD trazodone    #Normocytic anemia.   Per previous hospitalization, seems baseline around 9-10 per outpatient records, currently 11.  - No intervention at this time, continue to monitor    # Hypothyroidism.    On home Levothyroxine 50mcg daily, but states that he is not currently taking it outside of the hospital.  - Continue home meds.    #Hyperlipidemia.   On home Atorvastatin 10mg QHS and reports good adherence.  - Continue home meds.    Patient was discharged to: home    New medications: insulin reg  Changes to old medications: decrease gabapentin to qd  Medications that were stopped: sodium bicarb tabs, Trazadone    Items to follow up as outpatient: HD, nephro    Physical exam at the time of discharge:  General: NAD  HEENT: NC/AT; PERRL, anicteric sclera; MMM  Neck: supple  Cardiovascular: +S1/S2, RRR  Respiratory: CTA B/L; no W/R/R  Gastrointestinal: soft, NT/ND; +BSx4; ostomy bag in place draining brown stool, no signs of leakage  Extremities: WWP; no edema, clubbing or cyanosis  Vascular: 2+ radial, DP/PT pulses B/L  Neurological: AAOx3; no focal deficits  Psychiatric: pleasant mood and affect  Dermatologic: no appreciable wounds or damage to the skin

## 2024-11-04 NOTE — DISCHARGE NOTE PROVIDER - PROVIDER TOKENS
FREE:[LAST:[Vero],FIRST:[Ovidio],PHONE:[phone]],FAX:[(   )    -],ADDRESS:[[facility] + [place] [address]],FOLLOWUP:[1 week]] FREE:[LAST:[Vero],FIRST:[Ovidio],PHONE:[phone]],FAX:[(   )    -],ADDRESS:[[facility] + [place] [address]],FOLLOWUP:[1 week]],PROVIDER:[TOKEN:[[id number]],FOLLOWUP:[1 week]] FREE:[LAST:[Vero],FIRST:[Ovidio],PHONE:[phone]],FAX:[(   )    -],ADDRESS:[[facility] [place] [address]],FOLLOWUP:[1 week]],PROVIDER:[TOKEN:[[id number]],SCHEDULEDAPPT:[11/22/2024],SCHEDULEDAPPTTIME:[02:40 PM]] FREE:[LAST:[Vero],FIRST:[Ovidio],PHONE:[phone]:[(   )    -],ADDRESS:[[facility] + [place] [address]],FOLLOWUP:[1 week]],FREE:[LAST:[Jc],FIRST:[Faustino],PHONE:[phone]:[(   )    -],ADDRESS:[[address] [place] [address], [address] [facility]],SCHEDULEDAPPT:[11/08/2024],SCHEDULEDAPPTTIME:[01:00 PM]]

## 2024-11-04 NOTE — PROGRESS NOTE ADULT - SUBJECTIVE AND OBJECTIVE BOX
OVERNIGHT EVENTS:    SUBJECTIVE / INTERVAL HPI: Patient seen and examined at bedside.       PHYSICAL EXAM:    General: NAD  HEENT: NC/AT; PERRL, anicteric sclera; MMM  Neck: supple  Cardiovascular: +S1/S2, RRR  Respiratory: CTA B/L; no W/R/R  Gastrointestinal: soft, NT/ND; +BSx4  Extremities: WWP; no edema, clubbing or cyanosis  Vascular: 2+ radial, DP/PT pulses B/L  Neurological: AAOx3; no focal deficits  Psychiatric: pleasant mood and affect  Dermatologic: no appreciable wounds or damage to the skin    VITAL SIGNS:  Vital Signs Last 24 Hrs  T(C): 36.4 (04 Nov 2024 06:07), Max: 37.3 (03 Nov 2024 21:21)  T(F): 97.6 (04 Nov 2024 06:07), Max: 99.1 (03 Nov 2024 21:21)  HR: 64 (04 Nov 2024 06:07) (64 - 72)  BP: 111/60 (04 Nov 2024 06:07) (111/60 - 166/75)  BP(mean): --  RR: 18 (04 Nov 2024 06:07) (17 - 18)  SpO2: 98% (04 Nov 2024 06:07) (97% - 98%)    Parameters below as of 04 Nov 2024 06:07  Patient On (Oxygen Delivery Method): room air          MEDICATIONS:  MEDICATIONS  (STANDING):  atorvastatin 10 milliGRAM(s) Oral at bedtime  chlorhexidine 2% Cloths 1 Application(s) Topical daily  dextrose 5%. 1000 milliLiter(s) (50 mL/Hr) IV Continuous <Continuous>  dextrose 5%. 1000 milliLiter(s) (100 mL/Hr) IV Continuous <Continuous>  dextrose 50% Injectable 25 Gram(s) IV Push once  dextrose 50% Injectable 12.5 Gram(s) IV Push once  dextrose 50% Injectable 25 Gram(s) IV Push once  gabapentin 100 milliGRAM(s) Oral at bedtime  glucagon  Injectable 1 milliGRAM(s) IntraMuscular once  heparin   Injectable 5000 Unit(s) SubCutaneous every 8 hours  influenza   Vaccine 0.5 milliLiter(s) IntraMuscular once  insulin glargine Injectable (LANTUS) 25 Unit(s) SubCutaneous at bedtime  insulin lispro (ADMELOG) corrective regimen sliding scale   SubCutaneous Before meals and at bedtime  insulin lispro Injectable (ADMELOG) 9 Unit(s) SubCutaneous three times a day before meals  levothyroxine 50 MICROGram(s) Oral daily  melatonin 5 milliGRAM(s) Oral at bedtime  NIFEdipine XL 60 milliGRAM(s) Oral every 24 hours  tamsulosin 0.4 milliGRAM(s) Oral at bedtime    MEDICATIONS  (PRN):  acetaminophen     Tablet .. 650 milliGRAM(s) Oral every 6 hours PRN Mild Pain (1 - 3)  dextrose Oral Gel 15 Gram(s) Oral once PRN Blood Glucose LESS THAN 70 milliGRAM(s)/deciliter      ALLERGIES:  Allergies    No Known Allergies    Intolerances        LABS:                        9.2    5.55  )-----------( 150      ( 03 Nov 2024 05:30 )             27.3     11-03    137  |  102  |  26[H]  ----------------------------<  166[H]  3.8   |  24  |  3.77[H]    Ca    8.4      03 Nov 2024 05:30  Phos  3.0     11-03  Mg     1.8     11-03        Urinalysis Basic - ( 03 Nov 2024 05:30 )    Color: x / Appearance: x / SG: x / pH: x  Gluc: 166 mg/dL / Ketone: x  / Bili: x / Urobili: x   Blood: x / Protein: x / Nitrite: x   Leuk Esterase: x / RBC: x / WBC x   Sq Epi: x / Non Sq Epi: x / Bacteria: x      CAPILLARY BLOOD GLUCOSE      POCT Blood Glucose.: 326 mg/dL (03 Nov 2024 21:47)      RADIOLOGY & ADDITIONAL TESTS: Reviewed. OVERNIGHT EVENTS: SAM.    SUBJECTIVE / INTERVAL HPI: Patient seen and examined at bedside. Pt resting comfortably. States he still has some dizziness when he arises from sitting to standing and walks around, but otherwise doing well. His ostomy has good output. He is making good urine. Denies fevers, chills, HA, chest pain, SOB, n/v/d, abd pain, dysuria. ROS otherwise negative.      PHYSICAL EXAM:    General: NAD  HEENT: NC/AT; PERRL, anicteric sclera; MMM  Neck: supple  Cardiovascular: +S1/S2, RRR  Respiratory: CTA B/L; no W/R/R  Gastrointestinal: soft, NT/ND; +BSx4; ostomy bag in place draining brown stool, no signs of leakage  Extremities: WWP; no edema, clubbing or cyanosis  Vascular: 2+ radial, DP/PT pulses B/L  Neurological: AAOx3; no focal deficits  Psychiatric: pleasant mood and affect  Dermatologic: no appreciable wounds or damage to the skin    VITAL SIGNS:  Vital Signs Last 24 Hrs  T(C): 36.4 (04 Nov 2024 06:07), Max: 37.3 (03 Nov 2024 21:21)  T(F): 97.6 (04 Nov 2024 06:07), Max: 99.1 (03 Nov 2024 21:21)  HR: 64 (04 Nov 2024 06:07) (64 - 72)  BP: 111/60 (04 Nov 2024 06:07) (111/60 - 166/75)  BP(mean): --  RR: 18 (04 Nov 2024 06:07) (17 - 18)  SpO2: 98% (04 Nov 2024 06:07) (97% - 98%)    Parameters below as of 04 Nov 2024 06:07  Patient On (Oxygen Delivery Method): room air          MEDICATIONS:  MEDICATIONS  (STANDING):  atorvastatin 10 milliGRAM(s) Oral at bedtime  chlorhexidine 2% Cloths 1 Application(s) Topical daily  dextrose 5%. 1000 milliLiter(s) (50 mL/Hr) IV Continuous <Continuous>  dextrose 5%. 1000 milliLiter(s) (100 mL/Hr) IV Continuous <Continuous>  dextrose 50% Injectable 25 Gram(s) IV Push once  dextrose 50% Injectable 12.5 Gram(s) IV Push once  dextrose 50% Injectable 25 Gram(s) IV Push once  gabapentin 100 milliGRAM(s) Oral at bedtime  glucagon  Injectable 1 milliGRAM(s) IntraMuscular once  heparin   Injectable 5000 Unit(s) SubCutaneous every 8 hours  influenza   Vaccine 0.5 milliLiter(s) IntraMuscular once  insulin glargine Injectable (LANTUS) 25 Unit(s) SubCutaneous at bedtime  insulin lispro (ADMELOG) corrective regimen sliding scale   SubCutaneous Before meals and at bedtime  insulin lispro Injectable (ADMELOG) 9 Unit(s) SubCutaneous three times a day before meals  levothyroxine 50 MICROGram(s) Oral daily  melatonin 5 milliGRAM(s) Oral at bedtime  NIFEdipine XL 60 milliGRAM(s) Oral every 24 hours  tamsulosin 0.4 milliGRAM(s) Oral at bedtime    MEDICATIONS  (PRN):  acetaminophen     Tablet .. 650 milliGRAM(s) Oral every 6 hours PRN Mild Pain (1 - 3)  dextrose Oral Gel 15 Gram(s) Oral once PRN Blood Glucose LESS THAN 70 milliGRAM(s)/deciliter      ALLERGIES:  Allergies    No Known Allergies    Intolerances        LABS:                        9.2    5.55  )-----------( 150      ( 03 Nov 2024 05:30 )             27.3     11-03    137  |  102  |  26[H]  ----------------------------<  166[H]  3.8   |  24  |  3.77[H]    Ca    8.4      03 Nov 2024 05:30  Phos  3.0     11-03  Mg     1.8     11-03        Urinalysis Basic - ( 03 Nov 2024 05:30 )    Color: x / Appearance: x / SG: x / pH: x  Gluc: 166 mg/dL / Ketone: x  / Bili: x / Urobili: x   Blood: x / Protein: x / Nitrite: x   Leuk Esterase: x / RBC: x / WBC x   Sq Epi: x / Non Sq Epi: x / Bacteria: x      CAPILLARY BLOOD GLUCOSE      POCT Blood Glucose.: 326 mg/dL (03 Nov 2024 21:47)      RADIOLOGY & ADDITIONAL TESTS: Reviewed.

## 2024-11-04 NOTE — PROGRESS NOTE ADULT - PROBLEM SELECTOR PLAN 8
On home Levothyroxine 50mcg daily, but states that he is not currently taking it outside of the hospital.    - Continue home meds none

## 2024-11-04 NOTE — DISCHARGE NOTE PROVIDER - CARE PROVIDER_API CALL
Ovidio Pham  [facility]/[address] [address]  Phone: [phone]  Fax: (   )    -  Follow Up Time: 1 week   Ovidio Pham  [facility]/[address] [address]  Phone: [phone]  Fax: (   )    -  Follow Up Time: 1 week    Frank Mata  Nephrology  [address]  [place] [address]  Phone: [phone]  Fax: [phone]  Follow Up Time: 1 week   Ovidio Pham  [facility]/[address] [address]  Phone: [phone]  Fax: (   )    -  Follow Up Time: 1 week    Ravi Denton  Nephrology  [address]  [place] [address]  Phone: [phone]  Fax: [phone]  Scheduled Appointment: 11/22/2024 02:40 PM   Ovidio Pham  [facility]/[place] [address]  Phone: [phone]  Fax: (   )    -  Follow Up Time: 1 week    Faustino Greene  [address], [facility] [address]  Phone: [phone]  Fax: (   )    -  Scheduled Appointment: 11/08/2024 01:00 PM

## 2024-11-04 NOTE — CHART NOTE - NSCHARTNOTEFT_GEN_A_CORE
On call notes   Called by ICU resident to inform me about this pt with HTN emergency w/ flash pulmonary edema  s/p fistulogram today 10/31.   Bedside POCUS showed diffuse B-lines    o/e  On arrival pt seen in severe respiratory distress with Bipap   respiratory: scattered fines creps bilateral bibasal,   Cardiovascular: RRR,  Present s1 s2, no s3 m-  abd: obsese , soft, nt,nd, ns present, no organomegaly  ext: no pitting edema, well perfused  NS: anxious, answers questions appropriately, no asterixes    Assessment:  HTN emergency with flush pulmonary edema   ESRD with volume overload    Plan  For urgent HD : To do UF 2.r5 and  solute removal  See prescription below  Hemodialysis Treatment.:     Schedule: Once, Modality: Hemodialysis, Access: Internal Jugular Central Venous Catheter    Dialyzer: Optiflux [id number], Time: 180 Min    Blood Flow: 400 mL/Min , Dialysate Flow: 500 mL/Min, Dialysate Temp: 36.5, Tubinmm (Adult)    Target Fluid Removal: 2.5 Liters    Dialysate Electrolytes (mEq/L): Potassium 2, Calcium 2.5, Sodium 138, Bicarbonate 35 (10-31-24 @ 18:02) [Active]    -Needs ECHO  -Will follow
PPD placed on 11/2 at 9:15AM, placed on Right forearm, 9cm down from R antecubital space.     PPD will be read on 11/4 AM
PPD placed on 11/2 at 9:15AM, placed on Right forearm, 9cm down from R antecubital space.     PPD read on 11/4 AM -> Negative (<5mm)

## 2024-11-04 NOTE — DISCHARGE NOTE PROVIDER - ATTENDING DISCHARGE PHYSICAL EXAMINATION:
General: NAD  HEENT: NC/AT; PERRL, anicteric sclera; MMM  Neck: supple  Cardiovascular: +S1/S2, RRR  Respiratory: CTA B/L; no W/R/R  Gastrointestinal: soft, NT/ND; +BSx4; ostomy bag in place draining brown stool, no signs of leakage  Extremities: WWP; no edema, clubbing or cyanosis  Vascular: 2+ radial, DP/PT pulses B/L  Neurological: AAOx3; no focal deficits  Psychiatric: pleasant mood and affect  Dermatologic: no appreciable wounds or damage to the skin

## 2024-11-04 NOTE — DISCHARGE NOTE PROVIDER - NSDCFUSCHEDAPPT_GEN_ALL_CORE_FT
Rachael Greenej  [facility]  NEPHRO [address]  Scheduled Appointment: 11/08/2024

## 2024-11-04 NOTE — PROGRESS NOTE ADULT - PROBLEM SELECTOR PLAN 1
Pt with CKD 3 (baseline Cr 2.3-2.5), currently with Cr 6.47. Etiology likely pre-renal in setting poor PO intake. Patient will likely need HD as likely not to recover kidney function.   UA shows bacteria and LE; however pt remains asymptomatic. no indication for treatment.    - Nephro consulted, f/u recs  - S/p TDC placement  - S/p 2/3 HD sessions --> pt will undergo 3rd session tomorrow  - PPD placed 11/2, f/u in 48-72 hours  - F/u Quantiferon  - Hepatitis panel neg Pt with CKD 3 (baseline Cr 2.3-2.5), currently with Cr 6.47. Etiology likely pre-renal in setting poor PO intake. Patient will likely need HD as likely not to recover kidney function.   UA shows bacteria and LE; however pt remains asymptomatic. no indication for treatment.    - Nephro consulted, f/u recs  - S/p TDC placement  - S/p 2/3 HD sessions --> pt will undergo 3rd session today  - PPD placed 11/2, negative after 48hrs  - F/u Quantiferon  - Hepatitis panel neg

## 2024-11-05 ENCOUNTER — TRANSCRIPTION ENCOUNTER (OUTPATIENT)
Age: 64
End: 2024-11-05

## 2024-11-05 VITALS — DIASTOLIC BLOOD PRESSURE: 69 MMHG | SYSTOLIC BLOOD PRESSURE: 141 MMHG

## 2024-11-05 LAB
ANION GAP SERPL CALC-SCNC: 15 MMOL/L — SIGNIFICANT CHANGE UP (ref 5–17)
BUN SERPL-MCNC: 32 MG/DL — HIGH (ref 7–23)
CALCIUM SERPL-MCNC: 8.9 MG/DL — SIGNIFICANT CHANGE UP (ref 8.4–10.5)
CHLORIDE SERPL-SCNC: 97 MMOL/L — SIGNIFICANT CHANGE UP (ref 96–108)
CO2 SERPL-SCNC: 22 MMOL/L — SIGNIFICANT CHANGE UP (ref 22–31)
CREAT SERPL-MCNC: 4.13 MG/DL — HIGH (ref 0.5–1.3)
EGFR: 15 ML/MIN/1.73M2 — LOW
GLUCOSE SERPL-MCNC: 134 MG/DL — HIGH (ref 70–99)
HCT VFR BLD CALC: 31.8 % — LOW (ref 39–50)
HGB BLD-MCNC: 10.4 G/DL — LOW (ref 13–17)
MAGNESIUM SERPL-MCNC: 1.8 MG/DL — SIGNIFICANT CHANGE UP (ref 1.6–2.6)
MCHC RBC-ENTMCNC: 30.4 PG — SIGNIFICANT CHANGE UP (ref 27–34)
MCHC RBC-ENTMCNC: 32.7 G/DL — SIGNIFICANT CHANGE UP (ref 32–36)
MCV RBC AUTO: 93 FL — SIGNIFICANT CHANGE UP (ref 80–100)
NRBC # BLD: 0 /100 WBCS — SIGNIFICANT CHANGE UP (ref 0–0)
PHOSPHATE SERPL-MCNC: 4.1 MG/DL — SIGNIFICANT CHANGE UP (ref 2.5–4.5)
PLATELET # BLD AUTO: 207 K/UL — SIGNIFICANT CHANGE UP (ref 150–400)
POTASSIUM SERPL-MCNC: 4.3 MMOL/L — SIGNIFICANT CHANGE UP (ref 3.5–5.3)
POTASSIUM SERPL-SCNC: 4.3 MMOL/L — SIGNIFICANT CHANGE UP (ref 3.5–5.3)
RBC # BLD: 3.42 M/UL — LOW (ref 4.2–5.8)
RBC # FLD: 12.6 % — SIGNIFICANT CHANGE UP (ref 10.3–14.5)
SODIUM SERPL-SCNC: 134 MMOL/L — LOW (ref 135–145)
WBC # BLD: 9.65 K/UL — SIGNIFICANT CHANGE UP (ref 3.8–10.5)
WBC # FLD AUTO: 9.65 K/UL — SIGNIFICANT CHANGE UP (ref 3.8–10.5)

## 2024-11-05 PROCEDURE — 86803 HEPATITIS C AB TEST: CPT

## 2024-11-05 PROCEDURE — 83605 ASSAY OF LACTIC ACID: CPT

## 2024-11-05 PROCEDURE — 77001 FLUOROGUIDE FOR VEIN DEVICE: CPT

## 2024-11-05 PROCEDURE — C1887: CPT

## 2024-11-05 PROCEDURE — 96374 THER/PROPH/DIAG INJ IV PUSH: CPT

## 2024-11-05 PROCEDURE — 80053 COMPREHEN METABOLIC PANEL: CPT

## 2024-11-05 PROCEDURE — 84155 ASSAY OF PROTEIN SERUM: CPT

## 2024-11-05 PROCEDURE — 85730 THROMBOPLASTIN TIME PARTIAL: CPT

## 2024-11-05 PROCEDURE — C1750: CPT

## 2024-11-05 PROCEDURE — 83690 ASSAY OF LIPASE: CPT

## 2024-11-05 PROCEDURE — C1769: CPT

## 2024-11-05 PROCEDURE — 81001 URINALYSIS AUTO W/SCOPE: CPT

## 2024-11-05 PROCEDURE — 99291 CRITICAL CARE FIRST HOUR: CPT

## 2024-11-05 PROCEDURE — 83930 ASSAY OF BLOOD OSMOLALITY: CPT

## 2024-11-05 PROCEDURE — 99233 SBSQ HOSP IP/OBS HIGH 50: CPT

## 2024-11-05 PROCEDURE — 82550 ASSAY OF CK (CPK): CPT

## 2024-11-05 PROCEDURE — 85025 COMPLETE CBC W/AUTO DIFF WBC: CPT

## 2024-11-05 PROCEDURE — 83880 ASSAY OF NATRIURETIC PEPTIDE: CPT

## 2024-11-05 PROCEDURE — 71045 X-RAY EXAM CHEST 1 VIEW: CPT

## 2024-11-05 PROCEDURE — 86705 HEP B CORE ANTIBODY IGM: CPT

## 2024-11-05 PROCEDURE — 80307 DRUG TEST PRSMV CHEM ANLYZR: CPT

## 2024-11-05 PROCEDURE — 82553 CREATINE MB FRACTION: CPT

## 2024-11-05 PROCEDURE — 97161 PT EVAL LOW COMPLEX 20 MIN: CPT

## 2024-11-05 PROCEDURE — 86900 BLOOD TYPING SEROLOGIC ABO: CPT

## 2024-11-05 PROCEDURE — 84295 ASSAY OF SERUM SODIUM: CPT

## 2024-11-05 PROCEDURE — 99239 HOSP IP/OBS DSCHRG MGMT >30: CPT | Mod: GC

## 2024-11-05 PROCEDURE — 86850 RBC ANTIBODY SCREEN: CPT

## 2024-11-05 PROCEDURE — 83735 ASSAY OF MAGNESIUM: CPT

## 2024-11-05 PROCEDURE — 82977 ASSAY OF GGT: CPT

## 2024-11-05 PROCEDURE — 36558 INSERT TUNNELED CV CATH: CPT

## 2024-11-05 PROCEDURE — 84540 ASSAY OF URINE/UREA-N: CPT

## 2024-11-05 PROCEDURE — 99152 MOD SED SAME PHYS/QHP 5/>YRS: CPT

## 2024-11-05 PROCEDURE — 86480 TB TEST CELL IMMUN MEASURE: CPT

## 2024-11-05 PROCEDURE — 83036 HEMOGLOBIN GLYCOSYLATED A1C: CPT

## 2024-11-05 PROCEDURE — 76937 US GUIDE VASCULAR ACCESS: CPT

## 2024-11-05 PROCEDURE — 86334 IMMUNOFIX E-PHORESIS SERUM: CPT

## 2024-11-05 PROCEDURE — 86704 HEP B CORE ANTIBODY TOTAL: CPT

## 2024-11-05 PROCEDURE — 84156 ASSAY OF PROTEIN URINE: CPT

## 2024-11-05 PROCEDURE — 70450 CT HEAD/BRAIN W/O DYE: CPT | Mod: MC

## 2024-11-05 PROCEDURE — 97116 GAIT TRAINING THERAPY: CPT

## 2024-11-05 PROCEDURE — 82330 ASSAY OF CALCIUM: CPT

## 2024-11-05 PROCEDURE — 74176 CT ABD & PELVIS W/O CONTRAST: CPT | Mod: MC

## 2024-11-05 PROCEDURE — 83935 ASSAY OF URINE OSMOLALITY: CPT

## 2024-11-05 PROCEDURE — 86709 HEPATITIS A IGM ANTIBODY: CPT

## 2024-11-05 PROCEDURE — 82803 BLOOD GASES ANY COMBINATION: CPT

## 2024-11-05 PROCEDURE — 82962 GLUCOSE BLOOD TEST: CPT

## 2024-11-05 PROCEDURE — 84443 ASSAY THYROID STIM HORMONE: CPT

## 2024-11-05 PROCEDURE — 90935 HEMODIALYSIS ONE EVALUATION: CPT

## 2024-11-05 PROCEDURE — 36415 COLL VENOUS BLD VENIPUNCTURE: CPT

## 2024-11-05 PROCEDURE — 84300 ASSAY OF URINE SODIUM: CPT

## 2024-11-05 PROCEDURE — 86706 HEP B SURFACE ANTIBODY: CPT

## 2024-11-05 PROCEDURE — 87340 HEPATITIS B SURFACE AG IA: CPT

## 2024-11-05 PROCEDURE — 86901 BLOOD TYPING SEROLOGIC RH(D): CPT

## 2024-11-05 PROCEDURE — 82010 KETONE BODYS QUAN: CPT

## 2024-11-05 PROCEDURE — 99153 MOD SED SAME PHYS/QHP EA: CPT

## 2024-11-05 PROCEDURE — 82570 ASSAY OF URINE CREATININE: CPT

## 2024-11-05 PROCEDURE — 76700 US EXAM ABDOM COMPLETE: CPT

## 2024-11-05 PROCEDURE — 80048 BASIC METABOLIC PNL TOTAL CA: CPT

## 2024-11-05 PROCEDURE — 85610 PROTHROMBIN TIME: CPT

## 2024-11-05 PROCEDURE — 84165 PROTEIN E-PHORESIS SERUM: CPT

## 2024-11-05 PROCEDURE — 84132 ASSAY OF SERUM POTASSIUM: CPT

## 2024-11-05 PROCEDURE — 84100 ASSAY OF PHOSPHORUS: CPT

## 2024-11-05 PROCEDURE — 84484 ASSAY OF TROPONIN QUANT: CPT

## 2024-11-05 PROCEDURE — 85027 COMPLETE CBC AUTOMATED: CPT

## 2024-11-05 RX ORDER — INSULIN GLARGINE,HUM.REC.ANLOG 100/ML
25 VIAL (ML) SUBCUTANEOUS
Qty: 15 | Refills: 1
Start: 2024-11-05 | End: 2025-01-03

## 2024-11-05 RX ORDER — LEVOTHYROXINE SODIUM 88 MCG
1 TABLET ORAL
Qty: 30 | Refills: 1
Start: 2024-11-05 | End: 2025-01-03

## 2024-11-05 RX ORDER — INSULIN LISPRO 100/ML
12 VIAL (ML) SUBCUTANEOUS
Qty: 15 | Refills: 1
Start: 2024-11-05 | End: 2025-01-03

## 2024-11-05 RX ORDER — NIFEDIPINE 90 MG
1 TABLET, EXTENDED RELEASE 24 HR ORAL
Qty: 30 | Refills: 1
Start: 2024-11-05 | End: 2025-01-03

## 2024-11-05 RX ORDER — TAMSULOSIN HCL 0.4 MG
1 CAPSULE ORAL
Qty: 30 | Refills: 1
Start: 2024-11-05 | End: 2025-01-03

## 2024-11-05 RX ORDER — GABAPENTIN 300 MG/1
1 CAPSULE ORAL
Qty: 30 | Refills: 1
Start: 2024-11-05 | End: 2025-01-03

## 2024-11-05 RX ORDER — INSULIN LISPRO 100/ML
12 VIAL (ML) SUBCUTANEOUS
Qty: 15 | Refills: 1
Start: 2024-11-05

## 2024-11-05 RX ORDER — INSULIN GLARGINE,HUM.REC.ANLOG 100/ML
25 VIAL (ML) SUBCUTANEOUS
Qty: 3 | Refills: 1
Start: 2024-11-05 | End: 2025-01-03

## 2024-11-05 RX ORDER — TRAZODONE HYDROCHLORIDE 100 MG/1
1 TABLET ORAL
Refills: 0 | DISCHARGE

## 2024-11-05 RX ORDER — GABAPENTIN 300 MG/1
2 CAPSULE ORAL
Refills: 0 | DISCHARGE

## 2024-11-05 RX ADMIN — Medication 50 MICROGRAM(S): at 06:11

## 2024-11-05 RX ADMIN — Medication 650 MILLIGRAM(S): at 17:59

## 2024-11-05 RX ADMIN — Medication 60 MILLIGRAM(S): at 16:13

## 2024-11-05 RX ADMIN — Medication 650 MILLIGRAM(S): at 16:59

## 2024-11-05 RX ADMIN — Medication 4: at 18:24

## 2024-11-05 RX ADMIN — HEPARIN SODIUM 5000 UNIT(S): 10000 INJECTION INTRAVENOUS; SUBCUTANEOUS at 16:14

## 2024-11-05 RX ADMIN — Medication 12 UNIT(S): at 13:10

## 2024-11-05 RX ADMIN — CHLORHEXIDINE GLUCONATE 1 APPLICATION(S): 40 SOLUTION TOPICAL at 13:10

## 2024-11-05 RX ADMIN — GABAPENTIN 100 MILLIGRAM(S): 300 CAPSULE ORAL at 17:02

## 2024-11-05 RX ADMIN — Medication 4: at 13:10

## 2024-11-05 RX ADMIN — HEPARIN SODIUM 5000 UNIT(S): 10000 INJECTION INTRAVENOUS; SUBCUTANEOUS at 06:10

## 2024-11-05 RX ADMIN — Medication 12 UNIT(S): at 18:25

## 2024-11-05 NOTE — PROGRESS NOTE ADULT - PROVIDER SPECIALTY LIST ADULT
Internal Medicine
Nephrology
Nephrology
Internal Medicine
Nephrology
Nephrology
Internal Medicine
Nephrology
Internal Medicine

## 2024-11-05 NOTE — PROGRESS NOTE ADULT - PROBLEM SELECTOR PLAN 1
Pt with CKD 3 (baseline Cr 2.3-2.5), currently with Cr 6.47. Etiology likely pre-renal in setting poor PO intake. Patient will likely need HD as likely not to recover kidney function.   UA shows bacteria and LE; however pt remains asymptomatic. no indication for treatment.    - Nephro consulted, f/u recs  - S/p TDC placement  - S/p 2/3 HD sessions --> pt will undergo 3rd session today  - PPD placed 11/2, negative after 48hrs  - F/u Quantiferon  - Hepatitis panel neg Pt with CKD 3 (baseline Cr 2.3-2.5), currently with Cr 6.47. Etiology likely pre-renal in setting poor PO intake. Patient will likely need HD as likely not to recover kidney function.   UA shows bacteria and LE; however pt remains asymptomatic. no indication for treatment.    - Nephro consulted, f/u recs  - S/p TDC placement  - S/p 3/3 HD sessions --> pt will undergo additional session today  - PPD placed 11/2, negative after 48hrs  - F/u Quantiferon  - Hepatitis panel neg  - Pending placement at outpatient HD center

## 2024-11-05 NOTE — DISCHARGE NOTE NURSING/CASE MANAGEMENT/SOCIAL WORK - FINANCIAL ASSISTANCE
[facility] provides services at a reduced cost to those who are determined to be eligible through [facility]’s financial assistance program. Information regarding [facility]’s financial assistance program can be found by going to [email or web address] or by calling [phone].

## 2024-11-05 NOTE — PROGRESS NOTE ADULT - SUBJECTIVE AND OBJECTIVE BOX
NEPHROLOGY PROGRESS NOTE:    Interval history:  No overnight events. Patient seen and examined at bedside.    Vitals:  T(F): 98.5 (24 @ 06:03), Max: 98.6 (24 @ 21:37)  HR: 65 (24 @ 06:03)  BP: 107/59 (24 @ 06:03)  RR: 18 (24 @ 06:03)  SpO2: 99% (24 @ 06:03)  Wt(kg): --     @ 07:01  -   07:00  --------------------------------------------------------  IN: 0 mL / OUT: 2000 mL / NET: -2000 mL          PHYSICAL EXAM:  Constitutional: NAD, lying in bed  HEENT: NCAT, EOMI  Resp: CTAB, no respiratory distress  Cardio: Regular rate, +S1/S2  GI: Abd soft, non-tender, non-distended. Ostomy noted.  Ext: No LE edema, no cyanosis/clubbing  Skin: No rash on exposed skin, warm and well perfused  Neuro: Awake, alert, interactive  Access: R TDC c/d/i    Pertinent labs & Imagin-05    134[L]  |  97  |  32[H]  ----------------------------<  134[H]  4.3   |  22  |  4.13[H]    Ca    8.9      2024 08:12  Phos  4.1       Mg     1.8                                 10.4   9.65  )-----------( 207      ( 2024 08:12 )             31.8       Urine Studies:  Creatinine Trend: 4.13<--, 4.63<--, 3.77<--, 4.20<--, 5.57<--, 5.67<--  Urinalysis Basic - ( 2024 08:12 )    Color:  / Appearance:  / SG:  / pH:   Gluc: 134 mg/dL / Ketone:   / Bili:  / Urobili:    Blood:  / Protein:  / Nitrite:    Leuk Esterase:  / RBC:  / WBC    Sq Epi:  / Non Sq Epi:  / Bacteria:       Sodium, Random Urine: <20 mmol/L (10-30 @ 04:49)  Creatinine, Random Urine: 63 mg/dL (10-30 @ 04:49)  Protein/Creatinine Ratio Calculation: 0.6 Ratio (10-30 @ 04:49)  Osmolality, Random Urine: 225 mosm/kg (10-30 @ 04:49)      MEDICATIONS  (STANDING):  atorvastatin 10 milliGRAM(s) Oral at bedtime  chlorhexidine 2% Cloths 1 Application(s) Topical daily  dextrose 5%. 1000 milliLiter(s) (50 mL/Hr) IV Continuous <Continuous>  dextrose 5%. 1000 milliLiter(s) (100 mL/Hr) IV Continuous <Continuous>  dextrose 50% Injectable 25 Gram(s) IV Push once  dextrose 50% Injectable 12.5 Gram(s) IV Push once  dextrose 50% Injectable 25 Gram(s) IV Push once  gabapentin 100 milliGRAM(s) Oral at bedtime  glucagon  Injectable 1 milliGRAM(s) IntraMuscular once  heparin   Injectable 5000 Unit(s) SubCutaneous every 8 hours  influenza   Vaccine 0.5 milliLiter(s) IntraMuscular once  insulin glargine Injectable (LANTUS) 25 Unit(s) SubCutaneous at bedtime  insulin lispro (ADMELOG) corrective regimen sliding scale   SubCutaneous Before meals and at bedtime  insulin lispro Injectable (ADMELOG) 12 Unit(s) SubCutaneous three times a day before meals  levothyroxine 50 MICROGram(s) Oral daily  melatonin 5 milliGRAM(s) Oral at bedtime  NIFEdipine XL 60 milliGRAM(s) Oral every 24 hours  tamsulosin 0.4 milliGRAM(s) Oral at bedtime    MEDICATIONS  (PRN):  acetaminophen     Tablet .. 650 milliGRAM(s) Oral every 6 hours PRN Mild Pain (1 - 3)  dextrose Oral Gel 15 Gram(s) Oral once PRN Blood Glucose LESS THAN 70 milliGRAM(s)/deciliter

## 2024-11-05 NOTE — PROGRESS NOTE ADULT - ASSESSMENT
63 yo M with PMH of CKD3 (BScr 2.1-2.5), HTN, DM2 presented to ED with confusion, ostomy bag break, unable to provide history.  Found to have UTI, hyperkalemia.  Nephrology following for MEG on CKD vs progressing CKD.       1-MEG on CKD, suspect progression now to ESRD  Patient noted to have MEG on admission with  Cr 6.47 over baseline 2.5. Remains elevated today at 5.57. Enlarged Kidneys seen on recent US. Likely secondary to prolonged uncontrolled diabetes mellitus.   Concern for progression to ESRD.  Patient HD #1 (11/1), HD#2 (11/2), HD#3 (11/4) via right IJ TDC  - Social work consult for outpatient HD unit placement  - Strict I&O, daily weights  - Renal diet  - Fluid restriction <1.5L    Thank you for consulting Nephrology. We will continue to follow the patient closely and provide recommendations as needed.    Martin Jacques MD  PGY-4 Nephrology Fellow

## 2024-11-05 NOTE — PROGRESS NOTE ADULT - SUBJECTIVE AND OBJECTIVE BOX
OVERNIGHT EVENTS:    SUBJECTIVE / INTERVAL HPI: Patient seen and examined at bedside.       PHYSICAL EXAM:    General: NAD  HEENT: NC/AT; PERRL, anicteric sclera; MMM  Neck: supple  Cardiovascular: +S1/S2, RRR  Respiratory: CTA B/L; no W/R/R  Gastrointestinal: soft, NT/ND; +BSx4  Extremities: WWP; no edema, clubbing or cyanosis  Vascular: 2+ radial, DP/PT pulses B/L  Neurological: AAOx3; no focal deficits  Psychiatric: pleasant mood and affect  Dermatologic: no appreciable wounds or damage to the skin    VITAL SIGNS:  Vital Signs Last 24 Hrs  T(C): 36.9 (05 Nov 2024 06:03), Max: 37 (04 Nov 2024 21:37)  T(F): 98.5 (05 Nov 2024 06:03), Max: 98.6 (04 Nov 2024 21:37)  HR: 65 (05 Nov 2024 06:03) (65 - 75)  BP: 107/59 (05 Nov 2024 06:03) (107/59 - 155/63)  BP(mean): --  RR: 18 (05 Nov 2024 06:03) (14 - 18)  SpO2: 99% (05 Nov 2024 06:03) (96% - 99%)    Parameters below as of 05 Nov 2024 06:03  Patient On (Oxygen Delivery Method): room air          MEDICATIONS:  MEDICATIONS  (STANDING):  atorvastatin 10 milliGRAM(s) Oral at bedtime  chlorhexidine 2% Cloths 1 Application(s) Topical daily  dextrose 5%. 1000 milliLiter(s) (100 mL/Hr) IV Continuous <Continuous>  dextrose 5%. 1000 milliLiter(s) (50 mL/Hr) IV Continuous <Continuous>  dextrose 50% Injectable 25 Gram(s) IV Push once  dextrose 50% Injectable 12.5 Gram(s) IV Push once  dextrose 50% Injectable 25 Gram(s) IV Push once  gabapentin 100 milliGRAM(s) Oral at bedtime  glucagon  Injectable 1 milliGRAM(s) IntraMuscular once  heparin   Injectable 5000 Unit(s) SubCutaneous every 8 hours  influenza   Vaccine 0.5 milliLiter(s) IntraMuscular once  insulin glargine Injectable (LANTUS) 25 Unit(s) SubCutaneous at bedtime  insulin lispro (ADMELOG) corrective regimen sliding scale   SubCutaneous Before meals and at bedtime  insulin lispro Injectable (ADMELOG) 12 Unit(s) SubCutaneous three times a day before meals  levothyroxine 50 MICROGram(s) Oral daily  melatonin 5 milliGRAM(s) Oral at bedtime  NIFEdipine XL 60 milliGRAM(s) Oral every 24 hours  tamsulosin 0.4 milliGRAM(s) Oral at bedtime    MEDICATIONS  (PRN):  acetaminophen     Tablet .. 650 milliGRAM(s) Oral every 6 hours PRN Mild Pain (1 - 3)  dextrose Oral Gel 15 Gram(s) Oral once PRN Blood Glucose LESS THAN 70 milliGRAM(s)/deciliter      ALLERGIES:  Allergies    No Known Allergies    Intolerances        LABS:                        8.9    6.70  )-----------( 166      ( 04 Nov 2024 07:32 )             27.1     11-04    137  |  102  |  37[H]  ----------------------------<  124[H]  4.0   |  21[L]  |  4.63[H]    Ca    8.3[L]      04 Nov 2024 07:32  Phos  3.6     11-04  Mg     1.7     11-04        Urinalysis Basic - ( 04 Nov 2024 07:32 )    Color: x / Appearance: x / SG: x / pH: x  Gluc: 124 mg/dL / Ketone: x  / Bili: x / Urobili: x   Blood: x / Protein: x / Nitrite: x   Leuk Esterase: x / RBC: x / WBC x   Sq Epi: x / Non Sq Epi: x / Bacteria: x      CAPILLARY BLOOD GLUCOSE      POCT Blood Glucose.: 189 mg/dL (04 Nov 2024 21:56)      RADIOLOGY & ADDITIONAL TESTS: Reviewed. OVERNIGHT EVENTS: SAM.    SUBJECTIVE / INTERVAL HPI: Patient seen and examined at bedside. Pt resting comfortably. States he still feels numbness/tingling and pain in his b/l LE. Endorses continued dizziness from sitting to standing. Denies fevers, chills, HA, chest pain, SOB, n/v/d, dysuria. ROS otherwise negative.      PHYSICAL EXAM:    General: NAD  HEENT: NC/AT; PERRL, anicteric sclera; MMM  Neck: supple  Cardiovascular: +S1/S2, RRR  Respiratory: CTA B/L; no W/R/R  Gastrointestinal: soft, NT/ND; +BSx4; ostomy bag in place draining brown stool, no signs of leakage  Extremities: WWP; no edema, clubbing or cyanosis  Vascular: 2+ radial, DP/PT pulses B/L  Neurological: AAOx3; no focal deficits  Psychiatric: pleasant mood and affect  Dermatologic: no appreciable wounds or damage to the skin    VITAL SIGNS:  Vital Signs Last 24 Hrs  T(C): 36.9 (05 Nov 2024 06:03), Max: 37 (04 Nov 2024 21:37)  T(F): 98.5 (05 Nov 2024 06:03), Max: 98.6 (04 Nov 2024 21:37)  HR: 65 (05 Nov 2024 06:03) (65 - 75)  BP: 107/59 (05 Nov 2024 06:03) (107/59 - 155/63)  BP(mean): --  RR: 18 (05 Nov 2024 06:03) (14 - 18)  SpO2: 99% (05 Nov 2024 06:03) (96% - 99%)    Parameters below as of 05 Nov 2024 06:03  Patient On (Oxygen Delivery Method): room air          MEDICATIONS:  MEDICATIONS  (STANDING):  atorvastatin 10 milliGRAM(s) Oral at bedtime  chlorhexidine 2% Cloths 1 Application(s) Topical daily  dextrose 5%. 1000 milliLiter(s) (100 mL/Hr) IV Continuous <Continuous>  dextrose 5%. 1000 milliLiter(s) (50 mL/Hr) IV Continuous <Continuous>  dextrose 50% Injectable 25 Gram(s) IV Push once  dextrose 50% Injectable 12.5 Gram(s) IV Push once  dextrose 50% Injectable 25 Gram(s) IV Push once  gabapentin 100 milliGRAM(s) Oral at bedtime  glucagon  Injectable 1 milliGRAM(s) IntraMuscular once  heparin   Injectable 5000 Unit(s) SubCutaneous every 8 hours  influenza   Vaccine 0.5 milliLiter(s) IntraMuscular once  insulin glargine Injectable (LANTUS) 25 Unit(s) SubCutaneous at bedtime  insulin lispro (ADMELOG) corrective regimen sliding scale   SubCutaneous Before meals and at bedtime  insulin lispro Injectable (ADMELOG) 12 Unit(s) SubCutaneous three times a day before meals  levothyroxine 50 MICROGram(s) Oral daily  melatonin 5 milliGRAM(s) Oral at bedtime  NIFEdipine XL 60 milliGRAM(s) Oral every 24 hours  tamsulosin 0.4 milliGRAM(s) Oral at bedtime    MEDICATIONS  (PRN):  acetaminophen     Tablet .. 650 milliGRAM(s) Oral every 6 hours PRN Mild Pain (1 - 3)  dextrose Oral Gel 15 Gram(s) Oral once PRN Blood Glucose LESS THAN 70 milliGRAM(s)/deciliter      ALLERGIES:  Allergies    No Known Allergies    Intolerances        LABS:                        8.9    6.70  )-----------( 166      ( 04 Nov 2024 07:32 )             27.1     11-04    137  |  102  |  37[H]  ----------------------------<  124[H]  4.0   |  21[L]  |  4.63[H]    Ca    8.3[L]      04 Nov 2024 07:32  Phos  3.6     11-04  Mg     1.7     11-04        Urinalysis Basic - ( 04 Nov 2024 07:32 )    Color: x / Appearance: x / SG: x / pH: x  Gluc: 124 mg/dL / Ketone: x  / Bili: x / Urobili: x   Blood: x / Protein: x / Nitrite: x   Leuk Esterase: x / RBC: x / WBC x   Sq Epi: x / Non Sq Epi: x / Bacteria: x      CAPILLARY BLOOD GLUCOSE      POCT Blood Glucose.: 189 mg/dL (04 Nov 2024 21:56)      RADIOLOGY & ADDITIONAL TESTS: Reviewed.

## 2024-11-05 NOTE — PROGRESS NOTE ADULT - PROBLEM SELECTOR PLAN 4
Patient with T2DM, on basal bolus insulin regimen 20/7 at home. + at time of admission, A1c 9.2. Pt last picked up insulin from pharmacy in jan 2024. At that time, pt dosing was 24 units lantus in the morning. Reports that he is not currently taking Insulin at home. Has chronic peripheral neuropathy and takes Gabapentin 300mg BID at home.  BS have been more well-controlled on insulin regimen    - C/w Lantus 25u qhs  - Increase premeal from lispro 9U to 12U  - mISS  - carb consistent diet  - Decrease gabapentin to QD Patient with T2DM, on basal bolus insulin regimen 20/7 at home. + at time of admission, A1c 9.2. Pt last picked up insulin from pharmacy in jan 2024. At that time, pt dosing was 24 units lantus in the morning. Reports that he is not currently taking Insulin at home. Has chronic peripheral neuropathy and takes Gabapentin 300mg BID at home.  BS have been more well-controlled on insulin regimen    - C/w Lantus 25u qhs  - C/w lispro 12U premeal  - mISS  - carb consistent diet  - Decrease gabapentin to QD

## 2024-11-05 NOTE — PROGRESS NOTE ADULT - ATTENDING COMMENTS
have reviewed current status and discussed issues for discharge planning with patient and with soc work/discharge planning.  have reviewed above course and data. agree with findings and recommendations.

## 2024-11-05 NOTE — DISCHARGE NOTE NURSING/CASE MANAGEMENT/SOCIAL WORK - NSDCPEFALRISK_GEN_ALL_CORE
For information on Fall & Injury Prevention, visit: [email or web address] OR  [email or web address] OR  https://www.cdc.gov/steadi/patient.html

## 2024-11-05 NOTE — PROGRESS NOTE ADULT - PROBLEM SELECTOR PROBLEM 4
Type 2 diabetes mellitus with peripheral neuropathy

## 2024-11-05 NOTE — DISCHARGE NOTE NURSING/CASE MANAGEMENT/SOCIAL WORK - NSDCVIVACCINE_GEN_ALL_CORE_FT
Tdap; 09-Jun-2023 19:08; Karissa Bryant (RN); [employer]; [id number] (Exp. Date: 22-Feb-2025); IntraMuscular; Deltoid Right.; 0.5 milliLiter(s); VIS (VIS Published: 09-May-2013, VIS Presented: 09-Jun-2023);

## 2024-11-05 NOTE — DISCHARGE NOTE NURSING/CASE MANAGEMENT/SOCIAL WORK - PATIENT PORTAL LINK FT
You can access the FollowMyHealth Patient Portal offered by [facility] by registering at the following website: [email or web address]. By joining [facility]’s FollowMyHealth portal, you will also be able to view your health information using other applications (apps) compatible with our system.

## 2024-11-07 LAB
GAMMA INTERFERON BACKGROUND BLD IA-ACNC: 0.09 IU/ML — SIGNIFICANT CHANGE UP
M TB IFN-G BLD-IMP: POSITIVE
M TB IFN-G CD4+ BCKGRND COR BLD-ACNC: 1.4 IU/ML — SIGNIFICANT CHANGE UP
M TB IFN-G CD4+CD8+ BCKGRND COR BLD-ACNC: 2.01 IU/ML — SIGNIFICANT CHANGE UP
QUANT TB PLUS MITOGEN MINUS NIL: 0.03 IU/ML — SIGNIFICANT CHANGE UP

## 2024-11-08 ENCOUNTER — APPOINTMENT (OUTPATIENT)
Dept: NEPHROLOGY | Facility: CLINIC | Age: 64
End: 2024-11-08

## 2024-11-11 DIAGNOSIS — Z79.84 LONG TERM (CURRENT) USE OF ORAL HYPOGLYCEMIC DRUGS: ICD-10-CM

## 2024-11-11 DIAGNOSIS — N18.6 END STAGE RENAL DISEASE: ICD-10-CM

## 2024-11-11 DIAGNOSIS — E86.0 DEHYDRATION: ICD-10-CM

## 2024-11-11 DIAGNOSIS — D63.1 ANEMIA IN CHRONIC KIDNEY DISEASE: ICD-10-CM

## 2024-11-11 DIAGNOSIS — R53.1 WEAKNESS: ICD-10-CM

## 2024-11-11 DIAGNOSIS — E87.20 ACIDOSIS, UNSPECIFIED: ICD-10-CM

## 2024-11-11 DIAGNOSIS — E11.22 TYPE 2 DIABETES MELLITUS WITH DIABETIC CHRONIC KIDNEY DISEASE: ICD-10-CM

## 2024-11-11 DIAGNOSIS — K74.60 UNSPECIFIED CIRRHOSIS OF LIVER: ICD-10-CM

## 2024-11-11 DIAGNOSIS — E11.40 TYPE 2 DIABETES MELLITUS WITH DIABETIC NEUROPATHY, UNSPECIFIED: ICD-10-CM

## 2024-11-11 DIAGNOSIS — E86.1 HYPOVOLEMIA: ICD-10-CM

## 2024-11-11 DIAGNOSIS — G93.41 METABOLIC ENCEPHALOPATHY: ICD-10-CM

## 2024-11-11 DIAGNOSIS — R00.1 BRADYCARDIA, UNSPECIFIED: ICD-10-CM

## 2024-11-11 DIAGNOSIS — N40.0 BENIGN PROSTATIC HYPERPLASIA WITHOUT LOWER URINARY TRACT SYMPTOMS: ICD-10-CM

## 2024-11-11 DIAGNOSIS — E87.5 HYPERKALEMIA: ICD-10-CM

## 2024-11-11 DIAGNOSIS — E87.1 HYPO-OSMOLALITY AND HYPONATREMIA: ICD-10-CM

## 2024-11-11 DIAGNOSIS — Z79.4 LONG TERM (CURRENT) USE OF INSULIN: ICD-10-CM

## 2024-11-11 DIAGNOSIS — I12.0 HYPERTENSIVE CHRONIC KIDNEY DISEASE WITH STAGE 5 CHRONIC KIDNEY DISEASE OR END STAGE RENAL DISEASE: ICD-10-CM

## 2024-11-11 DIAGNOSIS — Z93.3 COLOSTOMY STATUS: ICD-10-CM

## 2024-11-11 DIAGNOSIS — E03.9 HYPOTHYROIDISM, UNSPECIFIED: ICD-10-CM

## 2024-11-11 DIAGNOSIS — E11.65 TYPE 2 DIABETES MELLITUS WITH HYPERGLYCEMIA: ICD-10-CM

## 2025-01-14 NOTE — ED PROVIDER NOTE - ENMT, MLM
[facility] Called because they fax'd over lab results that they would like Dr. Dorsey to see for Jorge Luis's visit today. If any questions can call the at [phone].    Airway patent, Nasal mucosa clear. Mouth with normal mucosa. Throat has no vesicles, no oropharyngeal exudates and uvula is midline.

## 2025-03-18 ENCOUNTER — NON-APPOINTMENT (OUTPATIENT)
Age: 65
End: 2025-03-18

## 2025-05-15 NOTE — DIETITIAN INITIAL EVALUATION ADULT. - COPY OF WEIGHT IN KG
Subjective   History of Present Illness  Since Monday night patient has been suffering from bilateral flank pain (right greater than left).  Patient states that it does not radiate.  Patient states there is no exacerbating relieving factors.  The patient states that the pain has been worsening and is associated with dysuria, frequency, urgency, and dribbling.  Patient states that he is also been having maxi hematuria.  Patient otherwise denies any other significant GI or  symptoms on review.      Review of Systems   Constitutional:  Positive for activity change. Negative for chills, diaphoresis and fever.   HENT: Negative.     Respiratory: Negative.     Cardiovascular: Negative.    Gastrointestinal:  Positive for abdominal pain. Negative for abdominal distention, constipation, diarrhea and vomiting.   Genitourinary:  Positive for difficulty urinating, dysuria, flank pain, frequency, hematuria and urgency.   Skin: Negative.    Neurological: Negative.    Psychiatric/Behavioral: Negative.         Past Medical History:   Diagnosis Date    Hypertension     Infectious viral hepatitis        No Known Allergies    No past surgical history on file.    Family History   Problem Relation Age of Onset    Kidney cancer Father        Social History     Socioeconomic History    Marital status:    Tobacco Use    Smoking status: Never     Passive exposure: Never    Smokeless tobacco: Current     Types: Chew   Vaping Use    Vaping status: Some Days    Substances: Nicotine, Flavoring    Devices: Disposable   Substance and Sexual Activity    Alcohol use: No    Drug use: No           Objective   Physical Exam  Vitals and nursing note reviewed.   Constitutional:       General: He is awake.      Appearance: He is overweight. He is ill-appearing. He is not toxic-appearing or diaphoretic.   HENT:      Head: Normocephalic and atraumatic.      Mouth/Throat:      Pharynx: Oropharynx is clear. No oropharyngeal exudate or posterior  oropharyngeal erythema.   Eyes:      General:         Right eye: No discharge.         Left eye: No discharge.      Conjunctiva/sclera: Conjunctivae normal.      Pupils: Pupils are equal, round, and reactive to light.   Cardiovascular:      Rate and Rhythm: Regular rhythm. Tachycardia present.   Abdominal:      General: Abdomen is protuberant. There is no distension.      Palpations: Abdomen is soft. There is no mass.      Tenderness: There is abdominal tenderness. There is right CVA tenderness and guarding. There is no left CVA tenderness or rebound.      Hernia: No hernia is present.   Skin:     Coloration: Skin is not jaundiced.      Findings: No bruising, erythema or rash.   Neurological:      General: No focal deficit present.      Mental Status: He is alert and oriented to person, place, and time. Mental status is at baseline.      Cranial Nerves: No cranial nerve deficit.   Psychiatric:         Behavior: Behavior normal. Behavior is cooperative.         Thought Content: Thought content normal.         Judgment: Judgment normal.         Procedures           ED Course  ED Course as of 05/15/25 2045   Thu May 15, 2025   1930 ECG demonstrates sinus tachycardia rate 159 bpm.  WI and QTc interval are normal as is QRS duration.  There are no acute ST-T wave changes. [RA]   1930 Urobilinogen, UA(!): 4.0 E.U./dL [RA]   1930 Nitrite, UA(!): Positive [RA]   1930 Leukocytes, UA(!): Large (3+) [RA]   1930 Protein, UA(!): 30 mg/dL (1+) [RA]   1930 Blood, UA(!): Moderate (2+) [RA]   1930 Bilirubin, UA(!): Small (1+) [RA]   1930 WBC(!): 12.64 [RA]   1930 WBC, UA(!): Too Numerous to Count [RA]   1930 Bacteria, UA(!): 4+ [RA]   1930 RBC, UA(!): 21-50 [RA]   2008 Procalcitonin(!): 0.70 [RA]   2009 Lipase(!): 152 [RA]   2009 Sodium(!): 129 [RA]   2009 Potassium(!): 3.4 [RA]   2009 Chloride(!): 94 [RA]   2009 Glucose(!): 159 [RA]   2009 WBC(!): 12.64 [RA]      ED Course User Index  [RA] Tyson Herrera MD                                                        Medical Decision Making  Patient presents today with right flank pain and hematuria.  Differential diagnosis would include renal colic, pyelonephritis, cystitis, AAA.    Patient is having appropriate workup in the emergency department has been diagnosed with pyelonephritis.  Patient is already received IV antibiotics in the emergency department and has been prescribed outpatient antibiotics to complete a full course of 1 week.  The patient has been recommended to follow-up with PCP.      Problems Addressed:  Pyelonephritis: complicated acute illness or injury    Amount and/or Complexity of Data Reviewed  Labs: ordered. Decision-making details documented in ED Course.  Radiology: ordered. Decision-making details documented in ED Course.  ECG/medicine tests: ordered and independent interpretation performed. Decision-making details documented in ED Course.    Risk  OTC drugs.  Prescription drug management.  Decision regarding hospitalization.        Final diagnoses:   Pyelonephritis       ED Disposition  ED Disposition       ED Disposition   Discharge    Condition   Stable    Comment   --               Provider, No Known  [facility]  [address] [address]  [phone]    Schedule an appointment as soon as possible for a visit in 3 days           Medication List      No changes were made to your prescriptions during this visit.            Tyson Herrera MD  05/15/25 2066     67.3

## 2025-05-19 NOTE — ED ADULT NURSE NOTE - CAS TRG GENERAL AIRWAY, MLM
Hematology Focus Note:  Chart reviewed, no CBC drawn today. Continue daily folic acid 1 mg oral daily. 2/3 iron sucrose IV today. Await results of capsule endoscopy. IV PPI per GI recs. Continue to hold Apixaban given persistent anemia. Transfuse 1 unit PRBC for Hgb less than 7.0. Repeat CBC in the am.     CHECO Jones  Oncology/Hematology  Office: [phone]     Patent

## 2025-07-04 NOTE — PROGRESS NOTE ADULT - PROBLEM SELECTOR PROBLEM 9
Depression
Prophylactic measure
Depression
Prophylactic measure
no